# Patient Record
Sex: FEMALE | Race: BLACK OR AFRICAN AMERICAN | Employment: UNEMPLOYED | URBAN - METROPOLITAN AREA
[De-identification: names, ages, dates, MRNs, and addresses within clinical notes are randomized per-mention and may not be internally consistent; named-entity substitution may affect disease eponyms.]

---

## 2017-01-03 RX ORDER — PREDNISONE 10 MG/1
10 TABLET ORAL
Qty: 30 TAB | Refills: 0 | Status: SHIPPED | OUTPATIENT
Start: 2017-01-03 | End: 2017-01-18 | Stop reason: SDUPTHER

## 2017-01-03 NOTE — TELEPHONE ENCOUNTER
Pt would like to get an refill on her prednisone to be sent to her pharmacy [facility]

## 2017-01-19 ENCOUNTER — TELEPHONE (OUTPATIENT)
Dept: PULMONOLOGY | Age: 62
End: 2017-01-19

## 2017-01-19 RX ORDER — PREDNISONE 10 MG/1
TABLET ORAL
Qty: 30 TAB | Refills: 3 | Status: SHIPPED | OUTPATIENT
Start: 2017-01-19 | End: 2017-02-07 | Stop reason: DRUGHIGH

## 2017-01-19 NOTE — TELEPHONE ENCOUNTER
The pt. Is contacted to make an appt. .    She c/o a lot of chest congestion with production of thick white sputum x 2 weeks. Congestion is audible over the phone. She also, has sweating episodes. Increased SOB and Wheezing scale 4/10. She is usually on Prednisone 10 mg daily. She denies elevated temp. Allergies per list  [facility]

## 2017-01-19 NOTE — TELEPHONE ENCOUNTER
MD Radha Ward, OLIVIER       Caller: Unspecified (Today, 12:17 PM)                     Sounds like a cold which is usually self limited. Please advise pt to continue current meds. Prednisone renewed. Consult ED or urgent care if symptoms worsen       The pt is informed of same.

## 2017-01-28 ENCOUNTER — HOSPITAL ENCOUNTER (OUTPATIENT)
Dept: LAB | Age: 62
Discharge: HOME | End: 2017-01-28

## 2017-01-28 LAB — SENTARA SPECIMEN COL,SENBCF: NORMAL

## 2017-01-28 PROCEDURE — 99001 SPECIMEN HANDLING PT-LAB: CPT | Performed by: INTERNAL MEDICINE

## 2017-02-01 RX ORDER — PREDNISONE 10 MG/1
TABLET ORAL
Qty: 30 TAB | Refills: 0 | Status: SHIPPED | OUTPATIENT
Start: 2017-02-01 | End: 2017-02-07 | Stop reason: DRUGHIGH

## 2017-02-07 ENCOUNTER — OFFICE VISIT (OUTPATIENT)
Dept: PULMONOLOGY | Age: 62
End: 2017-02-07

## 2017-02-07 VITALS
TEMPERATURE: 98.4 F | HEIGHT: 65 IN | RESPIRATION RATE: 18 BRPM | BODY MASS INDEX: 36.99 KG/M2 | HEART RATE: 103 BPM | DIASTOLIC BLOOD PRESSURE: 60 MMHG | OXYGEN SATURATION: 99 % | WEIGHT: 222 LBS | SYSTOLIC BLOOD PRESSURE: 102 MMHG

## 2017-02-07 DIAGNOSIS — E66.9 OBESITY (BMI 30-39.9): ICD-10-CM

## 2017-02-07 DIAGNOSIS — N18.9 CHRONIC KIDNEY DISEASE, UNSPECIFIED STAGE: ICD-10-CM

## 2017-02-07 DIAGNOSIS — F17.200 TOBACCO DEPENDENCE: ICD-10-CM

## 2017-02-07 DIAGNOSIS — J44.9 CHRONIC AIRWAY OBSTRUCTION, NOT ELSEWHERE CLASSIFIED: Primary | ICD-10-CM

## 2017-02-07 DIAGNOSIS — D86.9 SARCOIDOSIS: ICD-10-CM

## 2017-02-07 DIAGNOSIS — R09.02 HYPOXEMIA REQUIRING SUPPLEMENTAL OXYGEN: ICD-10-CM

## 2017-02-07 DIAGNOSIS — Z99.81 HYPOXEMIA REQUIRING SUPPLEMENTAL OXYGEN: ICD-10-CM

## 2017-02-07 RX ORDER — PREDNISONE 5 MG/1
5 TABLET ORAL DAILY
Qty: 30 TAB | Refills: 1 | Status: ON HOLD | OUTPATIENT
Start: 2017-02-07 | End: 2017-04-12 | Stop reason: SDUPTHER

## 2017-02-07 NOTE — PROGRESS NOTES
Ms. Drew Mendoza has a reminder for a \"due or due soon\" health maintenance. I have asked that she contact her primary care provider for follow-up on this health maintenance.

## 2017-02-07 NOTE — MR AVS SNAPSHOT
Visit Information Date & Time Provider Department Dept. Phone Encounter #  
 2/7/2017  2:15 PM Nickie Choi MD [facility] [place] [id number] Follow-up Instructions Return in about 4 weeks (around 3/7/2017). Follow-up and Disposition History Your Appointments 2/9/2017 11:15 AM  
ESTABLISHED PATIENT with Lux Willoughby MD  
[facility]) Appt Note: 3 months [address] [facility], [address] [facility] [id number], [address] [place] [address] Upcoming Health Maintenance Date Due  
 FOOT EXAM Q1 9/12/1966 MICROALBUMIN Q1 9/12/1966 EYE EXAM RETINAL OR DILATED Q1 9/12/1966 DTaP/Tdap/Td series (1 - Tdap) 9/12/1977 FOBT Q 1 YEAR AGE 50-75 9/12/2006 BREAST CANCER SCRN MAMMOGRAM 3/20/2015 PAP AKA CERVICAL CYTOLOGY 8/27/2015 Pneumococcal 19-64 Highest Risk (2 of 3 - PCV13) 7/10/2016 INFLUENZA AGE 9 TO ADULT 8/1/2016 ZOSTER VACCINE AGE 60> 9/12/2016 HEMOGLOBIN A1C Q6M 4/30/2017 LIPID PANEL Q1 11/6/2017 Allergies as of 2/7/2017  Review Complete On: 2/7/2017 By: Nickie Choi MD  
  
 Severity Noted Reaction Type Reactions Moxifloxacin  09/24/2015    Rash Pcn [Penicillins]    Swelling Sulfa (Sulfonamide Antibiotics)    Swelling Current Immunizations  Reviewed on 11/29/2012 Name Date Influenza Vaccine 10/10/2015, 12/29/2014 Influenza Vaccine Split 10/30/2012 Pneumococcal Polysaccharide (PPSV-23) 7/10/2015 Not reviewed this visit You Were Diagnosed With   
  
 Codes Comments Chronic airway obstruction, not elsewhere classified ([facility] [address]    -  Primary ICD-10-CM: J44.9 ICD-9-CM: 242 Tobacco dependence     ICD-10-CM: V00.908 ICD-9-CM: 305.1 Obesity (BMI 30-39. 9)     ICD-10-CM: E66.9 ICD-9-CM: 278.00  Hypoxemia requiring supplemental oxygen     ICD-10-CM: R09.02, Z99.81 
 ICD-9-CM: 799.02 Chronic kidney disease, unspecified stage     ICD-10-CM: N18.9 ICD-9-CM: 583. 9 Sarcoidosis ([facility] [address]     ICD-10-CM: D86.9 ICD-9-CM: 135 Vitals BP Pulse Temp Resp Height(growth percentile) Weight(growth percentile) 102/60 (BP 1 Location: Left arm, BP Patient Position: Sitting) (!) 103 98.4 °F (36.9 °C) (Oral) 18 5' 5\" (1.651 m) 222 lb (100.7 kg) LMP SpO2 BMI OB Status Smoking Status 11/25/2012 99% 36.94 kg/m2 Postmenopausal Former Smoker BMI and BSA Data Body Mass Index Body Surface Area  
 36.94 kg/m 2 2.15 m 2 Preferred Pharmacy Pharmacy Name Phone [facility], [facility] [address] [phone] Your Updated Medication List  
  
   
This list is accurate as of: 2/7/17  2:59 PM.  Always use your most recent med list.  
  
  
  
  
 ABILIFY 5 mg tablet Generic drug:  ARIPiprazole Take 10 mg by mouth daily. * albuterol 2.5 mg /3 mL (0.083 %) nebulizer solution Commonly known as:  PROVENTIL VENTOLIN  
USE 1 NEB EVERY 4 HOURS FOR WHEEZING AND SHORTNESS OF BREATH  Indications: CHRONIC OBSTRUCTIVE PULMONARY DISEASE * VENTOLIN HFA 90 mcg/actuation inhaler Generic drug:  albuterol INHALE 2 PUFFS EVERY 4 HOURS AS NEEDED  
  
 aspirin 81 mg tablet Take 81 mg by mouth daily. clopidogrel 75 mg Tab Commonly known as:  PLAVIX Take 1 Tab by mouth daily. DEPAKOTE 250 mg tablet Generic drug:  divalproex DR Take 250 mg by mouth nightly. doxycycline 100 mg capsule Commonly known as:  [place] Take 1 Cap by mouth two (2) times a day. eplerenone 25 mg tablet Commonly known as:  Major Caroli Take  by mouth daily. furosemide 20 mg tablet Commonly known as:  LASIX Take 1 Tab by mouth daily. HYDROcodone-acetaminophen 5-325 mg per tablet Commonly known as:  Susan Jose Take 1 Tab by mouth every six (6) hours as needed for Pain. Max Daily Amount: 4 Tabs. insulin CONCENTRATED regular 500 unit/mL Soln Commonly known as:  U-500 CONCENTRATED  
20 Units by SubCUTAneous route. With breakfast, lunch, and dinner  
  
 insulin glargine 100 unit/mL injection Commonly known as:  LANTUS  
60 units subcutaneously every night  
  
 insulin syringe,safetyneedle 1 mL 30 gauge x 5/16\" Syrg As directed METANX 2.8-2-25 mg Tab Generic drug:  l-methylfolate-vit b12-vit b6 Take  by mouth.  
  
 metOLazone 5 mg tablet Commonly known as:  [place] Ou Take 0.5 Tabs by mouth Every Mon, Wed & Sun.  
  
 [employer] Kit Use with nebulizer machine NEURONTIN 300 mg capsule Generic drug:  gabapentin Take 300 mg by mouth three (3) times daily. Oxygen-Air Delivery Systems  
by Nasal route continuous. 2 LNC potassium chloride 20 mEq packet Commonly known as:  KLOR-CON Take 1 Packet by mouth daily. predniSONE 5 mg tablet Commonly known as:  Samanta Hawks Take 1 Tab by mouth daily. rosuvastatin 5 mg tablet Commonly known as:  CRESTOR Take 1 Tab by mouth nightly. SYNTHROID 100 mcg tablet Generic drug:  levothyroxine Take  by mouth Daily (before breakfast). traZODone 100 mg tablet Commonly known as:  Orma Paddy Take 100 mg by mouth nightly. TYLENOL 325 mg tablet Generic drug:  acetaminophen Take 325 mg by mouth every six (6) hours as needed for Pain. * umeclidinium-vilanterol 62.5-25 mcg/actuation inhaler Commonly known as:  Paulo Gusman Take 1 Puff by inhalation daily. * umeclidinium-vilanterol 62.5-25 mcg/actuation inhaler Commonly known as:  Paulo Gumsan Take 1 Puff by inhalation daily. VITAMIN D2 50,000 unit capsule Generic drug:  ergocalciferol Take 50,000 Units by mouth daily. ZOLOFT 100 mg tablet Generic drug:  sertraline Take 50 mg by mouth daily. * Notice:   This list has 4 medication(s) that are the same as other medications prescribed for you. Read the directions carefully, and ask your doctor or other care provider to review them with you. Prescriptions Sent to Pharmacy Refills  
 umeclidinium-vilanterol (ANORO ELLIPTA) 62.5-25 mcg/actuation inhaler 5 Sig: Take 1 Puff by inhalation daily. Class: Normal  
 Pharmacy: [facility] [place], [address] Ph #: [phone] Route: Inhalation  
 predniSONE (DELTASONE) 5 mg tablet 1 Sig: Take 1 Tab by mouth daily. Class: Normal  
 Pharmacy: [facility] [place], [address]  Ph #: [phone] Route: Oral  
  
Follow-up Instructions Return in about 4 weeks (around 3/7/2017). Please provide this summary of care documentation to your next provider. Your primary care clinician is listed as [address] [place] [address] Ave. If you have any questions after today's visit, please call [phone].

## 2017-02-07 NOTE — PROGRESS NOTES
HISTORY OF PRESENT ILLNESS  Lina Sanchez is a 62 y.o. female. HPI Comments: History of Sarcoidosis and COPD. Admitted  recently for COPD exacerbation. Pt still complains of SOB and audible wheezing moderately improved by Prednisone taper and BD, pt now on Prednisone 10 mg. Pt discharged home on Spiriva, pt notes some increase in baseline dyspnea and SNYDER. Pt admits to going back to smoking    Pt with history of CKD but has not seen a new nephrologist since Dr. Devorah Forman retired. Review of Systems   Constitutional: Positive for malaise/fatigue. Negative for chills, diaphoresis, fever and weight loss. HENT: Negative for congestion, ear discharge, ear pain, hearing loss, nosebleeds, sore throat and tinnitus. Eyes: Negative for blurred vision, double vision, photophobia, pain, discharge and redness. Respiratory: Positive for cough, shortness of breath and wheezing. Negative for hemoptysis, sputum production and stridor. Cardiovascular: Negative for palpitations, claudication and PND. Orthopnea: mild. Leg swelling: chronic. Gastrointestinal: Negative for constipation, diarrhea, heartburn, melena, nausea and vomiting. Genitourinary: Negative for dysuria, flank pain, frequency, hematuria and urgency. Musculoskeletal: Positive for back pain and joint pain. Negative for falls and myalgias. Skin: Negative for itching and rash. Neurological: Negative for dizziness, tingling, tremors, sensory change, speech change, focal weakness, seizures, loss of consciousness and weakness. Endo/Heme/Allergies: Negative for environmental allergies. Does not bruise/bleed easily. Psychiatric/Behavioral: Positive for depression. Negative for hallucinations, memory loss, substance abuse and suicidal ideas. The patient is nervous/anxious and has insomnia.       Past Medical History   Diagnosis Date    Anxiety     Asthma     Back injury 1986     jumped out of second story window    Behavioral change     Bilateral knee pain     Bipolar disorder (HCC)     CHF (congestive heart failure) (HCC)     Chronic airway obstruction, not elsewhere classified ([facility] [address]      SOB, on paula O2 Recent admission with psychosis    Chronic back pain     Chronic diastolic heart failure (HCC)      Stable on diuretics    Chronic kidney disease      stage III    Congestive heart failure (HCC)     COPD     COPD (chronic obstructive pulmonary disease) ([address] 10/31/2012    Cramps, muscle, general     Depression     Diabetes mellitus     Diabetes mellitus ([address]     Difficulty speaking     Difficulty walking     Difficulty writing     DJD (degenerative joint disease) of knee      bilateral, mild    Edema      The edema involves both lower extremities. The episodes last for 1 week. The patient describes this as worsening. Symptoms are exacerbated by prolonged sitting. Symptoms are relieved by leg elevation and diuretics. NO CHANGE    Emphysema     Essential hypertension, benign      Better controlled    Falls frequently     Fatigue     Generalized stiffness     Headache(784.0)     Heartburn     Hiatal hernia     History of hepatitis C     Hoarseness of voice     Hypertension     Hypothyroidism     Lung disease     Memory difficulty     Morbid obesity (HCC)     Multiple joint pain     Nausea     Numbness and tingling      arms and legs, related to diabetic neuropathy    Osteoarthritis of left knee     Other and unspecified hyperlipidemia      LDL reportedly very high/started on crestor recently by diabetic doctor    Oxygen dependent     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     Sarcoidosis ([facility] [address]     Sickle cell trait ([facility] [address]     Sinusitis     SOB (shortness of breath)     STD (female)      herpes    Swelling of body region      legs and feet    Tinnitus     Type II or unspecified type diabetes mellitus without mention of complication, not stated as uncontrolled     Venous insufficiency     Vertigo     Weakness generalized     Wears glasses     Weight gain      Current Outpatient Prescriptions on File Prior to Visit   Medication Sig Dispense Refill    VENTOLIN HFA 90 mcg/actuation inhaler INHALE 2 PUFFS EVERY 4 HOURS AS NEEDED 1 Inhaler 4    divalproex DR (DEPAKOTE) 250 mg tablet Take 250 mg by mouth nightly.  furosemide (LASIX) 20 mg tablet Take 1 Tab by mouth daily. 30 Tab 0    metOLazone (ZAROXOLYN) 5 mg tablet Take 0.5 Tabs by mouth Every Mon, Wed & Sun. 30 Tab 6    potassium chloride (KLOR-CON) 20 mEq packet Take 1 Packet by mouth daily. 30 Each 0    clopidogrel (PLAVIX) 75 mg tablet Take 1 Tab by mouth daily. 30 Tab 0    ARIPiprazole (ABILIFY) 5 mg tablet Take 10 mg by mouth daily.  albuterol (PROVENTIL VENTOLIN) 2.5 mg /3 mL (0.083 %) nebulizer solution USE 1 NEB EVERY 4 HOURS FOR WHEEZING AND SHORTNESS OF BREATH  Indications: CHRONIC OBSTRUCTIVE PULMONARY DISEASE 2 Package 3    eplerenone (INSPRA) 25 mg tablet Take  by mouth daily.  gabapentin (NEURONTIN) 300 mg capsule Take 300 mg by mouth three (3) times daily.  acetaminophen (TYLENOL) 325 mg tablet Take 325 mg by mouth every six (6) hours as needed for Pain.  traZODone (DESYREL) 100 mg tablet Take 100 mg by mouth nightly.  rosuvastatin (CRESTOR) 5 mg tablet Take 1 Tab by mouth nightly. 30 Tab 6    aspirin 81 mg tablet Take 81 mg by mouth daily.  Neb[employer] Kit Use with nebulizer machine 1 Kit prn    insulin CONCENTRATED regular (U-500 CONCENTRATED) 500 unit/mL Soln 20 Units by SubCUTAneous route. With breakfast, lunch, and dinner      levothyroxine (SYNTHROID) 100 mcg tablet Take  by mouth Daily (before breakfast).  sertraline (ZOLOFT) 100 mg tablet Take 50 mg by mouth daily.  Oxygen-Air Delivery Systems by Nasal route continuous. 2 LNC      ergocalciferol (VITAMIN D) 50,000 unit capsule Take 50,000 Units by mouth daily.       doxycycline (MONODOX) 100 mg capsule Take 1 Cap by mouth two (2) times a day. 20 Cap 0    insulin glargine (LANTUS) 100 unit/mL injection 60 units subcutaneously every night 1 Vial 0    HYDROcodone-acetaminophen (NORCO) 5-325 mg per tablet Take 1 Tab by mouth every six (6) hours as needed for Pain. Max Daily Amount: 4 Tabs. 20 Tab 0    insulin syringe,safetyneedle 1 mL 30 gauge x 5/16\" syrg As directed 50 Each 0    l-methylfolate-vit b12-vit b6 (METANX) 2.8-2-25 mg Tab Take  by mouth. No current facility-administered medications on file prior to visit. Allergies   Allergen Reactions    Moxifloxacin Rash    Pcn [Penicillins] Swelling    Sulfa (Sulfonamide Antibiotics) Swelling     Social History     Social History    Marital status: SINGLE     Spouse name: N/A    Number of children: N/A    Years of education: N/A     Occupational History    Not employed Not Employed     Social History Main Topics    Smoking status: Former Smoker     Packs/day: 0.50     Years: 30.00     Types: Cigarettes     Start date: 12/2/1969     Quit date: 10/31/2016    Smokeless tobacco: Never Used    Alcohol use No    Drug use: No      Comment: 2005    Sexual activity: Not on file      Comment: 2014     Other Topics Concern    Not on file     Social History Narrative    Lives with 15year old son. Blood pressure 102/60, pulse (!) 103, temperature 98.4 °F (36.9 °C), temperature source Oral, resp. rate 18, height 5' 5\" (1.651 m), weight 100.7 kg (222 lb), last menstrual period 11/25/2012, SpO2 99 %. Physical Exam   Constitutional: She is oriented to person, place, and time. No distress. obese   HENT:   Head: Normocephalic and atraumatic. Nose: Nose normal.   Eyes: Conjunctivae are normal. Pupils are equal, round, and reactive to light. Right eye exhibits no discharge. Left eye exhibits no discharge. No scleral icterus. Neck: No JVD present. No tracheal deviation present. No thyromegaly present. Cardiovascular: Normal rate, regular rhythm and intact distal pulses. Exam reveals no gallop. No murmur heard. Pulmonary/Chest: Effort normal and breath sounds normal. No stridor. No respiratory distress. Wheezes: bilateral inspiratory and expiratory. She has no rales. She exhibits no tenderness. Abdominal: Soft. She exhibits no mass. There is no tenderness. Musculoskeletal: She exhibits no tenderness. Edema: trace. Lymphadenopathy:     She has no cervical adenopathy. Neurological: She is alert and oriented to person, place, and time. Skin: Skin is warm and dry. No rash noted. She is not diaphoretic. No erythema. Large ecchymosis medial left forearm   Psychiatric: She has a normal mood and affect. Her behavior is normal. Judgment and thought content normal.       Rib Films 7/3/16: no fracture noted. CXR: chronic interstitial lung disease    ASSESSMENT and PLAN  Encounter Diagnoses   Name Primary?  Chronic airway obstruction, not elsewhere classified ([address] Yes    Tobacco dependence     Obesity (BMI 30-39. 9)     Hypoxemia requiring supplemental oxygen     Chronic kidney disease, unspecified stage     Sarcoidosis ([address]        Pt on chronic Prednisone and already suffering side effects of this. Will start slow taper, to start at 5 mg daily and follow up with me in 4 weeks. Resume Anoro. Rx for Albuterol refills sent. RTC 4 weeks. Counseled on smoking cessation. Guy Esparza

## 2017-02-09 ENCOUNTER — OFFICE VISIT (OUTPATIENT)
Dept: CARDIOLOGY CLINIC | Age: 62
End: 2017-02-09

## 2017-02-09 VITALS
SYSTOLIC BLOOD PRESSURE: 105 MMHG | BODY MASS INDEX: 36.49 KG/M2 | WEIGHT: 219 LBS | HEART RATE: 90 BPM | DIASTOLIC BLOOD PRESSURE: 75 MMHG | HEIGHT: 65 IN

## 2017-02-09 DIAGNOSIS — R94.39 ABNORMAL NUCLEAR STRESS TEST: ICD-10-CM

## 2017-02-09 DIAGNOSIS — I10 ESSENTIAL HYPERTENSION, BENIGN: ICD-10-CM

## 2017-02-09 DIAGNOSIS — Z01.810 PRE-OPERATIVE CARDIOVASCULAR EXAMINATION: ICD-10-CM

## 2017-02-09 DIAGNOSIS — I50.32 CHRONIC DIASTOLIC HEART FAILURE (HCC): Primary | ICD-10-CM

## 2017-02-09 NOTE — PROGRESS NOTES
HISTORY OF PRESENT ILLNESS  Sandi Amos is a 62 y.o. female. CHF   The history is provided by the patient. This is a chronic problem. The problem occurs constantly. The problem has not changed since onset. Associated symptoms include shortness of breath. Pertinent negatives include no chest pain. The symptoms are aggravated by exertion. The symptoms are relieved by rest.   Hypertension   The history is provided by the patient. This is a chronic problem. The problem occurs constantly. The problem has not changed since onset. Associated symptoms include shortness of breath. Pertinent negatives include no chest pain. Nothing aggravates the symptoms. Cholesterol Problem   The history is provided by the patient. This is a chronic problem. The problem occurs constantly. The problem has not changed since onset. Associated symptoms include shortness of breath. Pertinent negatives include no chest pain. Shortness of Breath   The history is provided by the patient. This is a recurrent problem. The problem occurs frequently. The problem has been gradually improving. Associated symptoms include leg swelling. Pertinent negatives include no fever, no cough, no sputum production, no hemoptysis, no wheezing, no PND, no orthopnea, no chest pain, no vomiting, no rash and no claudication. Associated medical issues include COPD and heart failure. Leg Swelling   The history is provided by the patient. This is a recurrent problem. The problem occurs every several days. Associated symptoms include shortness of breath. Pertinent negatives include no chest pain.      Family History   Problem Relation Age of Onset    Seizures Other     Diabetes Mother     Hypertension Mother     Heart Disease Mother     Cancer Mother     Diabetes Father     Stroke Father     Heart Disease Father     Headache Sister     Depression Neg Hx     Suicide Neg Hx     Psychotic Disorder Neg Hx     Substance Abuse Neg Hx     Dementia Neg Hx Past Medical History   Diagnosis Date    Anxiety     Asthma     Back injury 1986     jumped out of second story window    Behavioral change     Bilateral knee pain     Bipolar disorder ([facility]     CHF (congestive heart failure) (HCC)     Chronic airway obstruction, not elsewhere classified ([address]      SOB, on paula O2 Recent admission with psychosis    Chronic back pain     Chronic diastolic heart failure (HCC)      Stable on diuretics    Chronic kidney disease      stage III    Congestive heart failure (HCC)     COPD     COPD (chronic obstructive pulmonary disease) ([address] 10/31/2012    Cramps, muscle, general     Depression     Diabetes mellitus     Diabetes mellitus ([address]     Difficulty speaking     Difficulty walking     Difficulty writing     DJD (degenerative joint disease) of knee      bilateral, mild    Edema      The edema involves both lower extremities. The episodes last for 1 week. The patient describes this as worsening. Symptoms are exacerbated by prolonged sitting. Symptoms are relieved by leg elevation and diuretics. NO CHANGE    Emphysema     Essential hypertension, benign      Better controlled    Falls frequently     Fatigue     Generalized stiffness     Headache(784.0)     Heartburn     Hiatal hernia     History of hepatitis C     Hoarseness of voice     Hypertension     Hypothyroidism     Lung disease     Memory difficulty     Morbid obesity (HCC)     Multiple joint pain     Nausea     Numbness and tingling      arms and legs, related to diabetic neuropathy    Osteoarthritis of left knee     Other and unspecified hyperlipidemia      LDL reportedly very high/started on crestor recently by diabetic doctor    Oxygen dependent     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     Sarcoidosis ([address]     Sickle cell trait (HCC)     Sinusitis     SOB (shortness of breath)     STD (female)      herpes    Swelling of body region      legs and feet    Tinnitus     Type II or unspecified type diabetes mellitus without mention of complication, not stated as uncontrolled     Venous insufficiency     Vertigo     Weakness generalized     Wears glasses     Weight gain        Past Surgical History   Procedure Laterality Date    Hx back surgery      Hx  section      Hx tubal ligation         Allergies   Allergen Reactions    Moxifloxacin Rash    Pcn [Penicillins] Swelling    Sulfa (Sulfonamide Antibiotics) Swelling       Current Outpatient Prescriptions   Medication Sig    umeclidinium-vilanterol (ANORO ELLIPTA) 62.5-25 mcg/actuation inhaler Take 1 Puff by inhalation daily.  umeclidinium-vilanterol (ANORO ELLIPTA) 62.5-25 mcg/actuation inhaler Take 1 Puff by inhalation daily.  predniSONE (DELTASONE) 5 mg tablet Take 1 Tab by mouth daily.  VENTOLIN HFA 90 mcg/actuation inhaler INHALE 2 PUFFS EVERY 4 HOURS AS NEEDED    divalproex DR (DEPAKOTE) 250 mg tablet Take 250 mg by mouth nightly.  furosemide (LASIX) 20 mg tablet Take 1 Tab by mouth daily.  metOLazone (ZAROXOLYN) 5 mg tablet Take 0.5 Tabs by mouth Every Mon, Wed & Sun.    potassium chloride (KLOR-CON) 20 mEq packet Take 1 Packet by mouth daily.  clopidogrel (PLAVIX) 75 mg tablet Take 1 Tab by mouth daily.  insulin glargine (LANTUS) 100 unit/mL injection 60 units subcutaneously every night    insulin syringe,safetyneedle 1 mL 30 gauge x 16\" syrg As directed    ARIPiprazole (ABILIFY) 5 mg tablet Take 10 mg by mouth daily.  albuterol (PROVENTIL VENTOLIN) 2.5 mg /3 mL (0.083 %) nebulizer solution USE 1 NEB EVERY 4 HOURS FOR WHEEZING AND SHORTNESS OF BREATH  Indications: CHRONIC OBSTRUCTIVE PULMONARY DISEASE    eplerenone (INSPRA) 25 mg tablet Take  by mouth daily.  gabapentin (NEURONTIN) 300 mg capsule Take 300 mg by mouth three (3) times daily.  acetaminophen (TYLENOL) 325 mg tablet Take 325 mg by mouth every six (6) hours as needed for Pain.     traZODone (DESYREL) 100 mg tablet Take 100 mg by mouth nightly.  rosuvastatin (CRESTOR) 5 mg tablet Take 1 Tab by mouth nightly.  l-methylfolate-vit b12-vit b6 (METANX) 2.8-2-25 mg Tab Take  by mouth.  aspirin 81 mg tablet Take 81 mg by mouth daily.  Nebulizer Accessories Kit Use with nebulizer machine    insulin CONCENTRATED regular (U-500 CONCENTRATED) 500 unit/mL Soln 20 Units by SubCUTAneous route. With breakfast, lunch, and dinner    levothyroxine (SYNTHROID) 100 mcg tablet Take  by mouth Daily (before breakfast).  sertraline (ZOLOFT) 100 mg tablet Take 50 mg by mouth daily.  Oxygen-Air Delivery Systems by Nasal route continuous. 2 LNC    ergocalciferol (VITAMIN D) 50,000 unit capsule Take 50,000 Units by mouth daily. No current facility-administered medications for this visit. Visit Vitals    /75 (BP 1 Location: Left arm, BP Patient Position: At rest)    Pulse 90    Ht 5' 5\" (1.651 m)    Wt 99.3 kg (219 lb)    LMP 11/25/2012    BMI 36.44 kg/m2       Review of Systems   Constitutional: Negative for chills and fever. HENT: Negative for nosebleeds. Eyes: Negative for blurred vision and double vision. Respiratory: Positive for shortness of breath. Negative for cough, hemoptysis, sputum production and wheezing. Cardiovascular: Positive for leg swelling. Negative for chest pain, palpitations, orthopnea, claudication and PND. Gastrointestinal: Negative for heartburn, nausea and vomiting. Musculoskeletal: Negative for myalgias. Skin: Negative for rash. Neurological: Negative for dizziness and weakness. Endo/Heme/Allergies: Does not bruise/bleed easily.      Family History   Problem Relation Age of Onset    Seizures Other     Diabetes Mother     Hypertension Mother     Heart Disease Mother     Cancer Mother     Diabetes Father     Stroke Father     Heart Disease Father     Headache Sister     Depression Neg Hx     Suicide Neg Hx     Psychotic Disorder Neg Hx     Substance Abuse Neg Hx     Dementia Neg Hx        Past Medical History   Diagnosis Date    Anxiety     Asthma     Back injury 1986     jumped out of second story window    Behavioral change     Bilateral knee pain     B[facility] disorder [facility]     CHF (congestive heart failure) (HCC)     Chronic airway obstruction, not elsewhere classified ([facility] [facility]      SOB, on paula O2 Recent admission with psychosis    Chronic back pain     Chronic diastolic heart failure (HCC)      Stable on diuretics    Chronic kidney disease      stage III    Congestive heart failure (HCC)     COPD     COPD (chronic obstructive pulmonary disease) ([facility] [facility] 10/31/2012    Cramps, muscle, general     Depression     Diabetes mellitus     Diabetes mellitus ([address]     Difficulty speaking     Difficulty walking     Difficulty writing     DJD (degenerative joint disease) of knee      bilateral, mild    Edema      The edema involves both lower extremities. The episodes last for 1 week. The patient describes this as worsening. Symptoms are exacerbated by prolonged sitting. Symptoms are relieved by leg elevation and diuretics. NO CHANGE    Emphysema     Essential hypertension, benign      Better controlled    Falls frequently     Fatigue     Generalized stiffness     Headache(784.0)     Heartburn     Hiatal hernia     History of hepatitis C     Hoarseness of voice     Hypertension     Hypothyroidism     Lung disease     Memory difficulty     Morbid obesity (HCC)     Multiple joint pain     Nausea     Numbness and tingling      arms and legs, related to diabetic neuropathy    Osteoarthritis of left knee     Other and unspecified hyperlipidemia      LDL reportedly very high/started on crestor recently by diabetic doctor    Oxygen dependent     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     Sarcoidosis ([facility]     Sickle cell trait ([facility]     Sinusitis     SOB (shortness of breath)     STD (female)      herpes    Swelling of body region      legs and feet    Tinnitus     Type II or unspecified type diabetes mellitus without mention of complication, not stated as uncontrolled     Venous insufficiency     Vertigo     Weakness generalized     Wears glasses     Weight gain        Past Surgical History   Procedure Laterality Date    Hx back surgery      Hx  section      Hx tubal ligation         Allergies   Allergen Reactions    Moxifloxacin Rash    Pcn [Penicillins] Swelling    Sulfa (Sulfonamide Antibiotics) Swelling       Current Outpatient Prescriptions   Medication Sig    umeclidinium-vilanterol (ANORO ELLIPTA) 62.5-25 mcg/actuation inhaler Take 1 Puff by inhalation daily.  umeclidinium-vilanterol (ANORO ELLIPTA) 62.5-25 mcg/actuation inhaler Take 1 Puff by inhalation daily.  predniSONE (DELTASONE) 5 mg tablet Take 1 Tab by mouth daily.  VENTOLIN HFA 90 mcg/actuation inhaler INHALE 2 PUFFS EVERY 4 HOURS AS NEEDED    divalproex DR (DEPAKOTE) 250 mg tablet Take 250 mg by mouth nightly.  furosemide (LASIX) 20 mg tablet Take 1 Tab by mouth daily.  metOLazone (ZAROXOLYN) 5 mg tablet Take 0.5 Tabs by mouth Every Mon, Wed & Sun.    potassium chloride (KLOR-CON) 20 mEq packet Take 1 Packet by mouth daily.  clopidogrel (PLAVIX) 75 mg tablet Take 1 Tab by mouth daily.  insulin glargine (LANTUS) 100 unit/mL injection 60 units subcutaneously every night    insulin syringe,safetyneedle 1 mL 30 gauge x \" syrg As directed    ARIPiprazole (ABILIFY) 5 mg tablet Take 10 mg by mouth daily.  albuterol (PROVENTIL VENTOLIN) 2.5 mg /3 mL (0.083 %) nebulizer solution USE 1 NEB EVERY 4 HOURS FOR WHEEZING AND SHORTNESS OF BREATH  Indications: CHRONIC OBSTRUCTIVE PULMONARY DISEASE    eplerenone (INSPRA) 25 mg tablet Take  by mouth daily.  gabapentin (NEURONTIN) 300 mg capsule Take 300 mg by mouth three (3) times daily.     acetaminophen (TYLENOL) 325 mg tablet Take 325 mg by mouth every six (6) hours as needed for Pain.  traZODone (DESYREL) 100 mg tablet Take 100 mg by mouth nightly.  rosuvastatin (CRESTOR) 5 mg tablet Take 1 Tab by mouth nightly.  l-methylfolate-vit b12-vit b6 (METANX) 2.8-2-25 mg Tab Take  by mouth.  aspirin 81 mg tablet Take 81 mg by mouth daily.  Nebulizer Accessories Kit Use with nebulizer machine    insulin CONCENTRATED regular (U-500 CONCENTRATED) 500 unit/mL Soln 20 Units by SubCUTAneous route. With breakfast, lunch, and dinner    levothyroxine (SYNTHROID) 100 mcg tablet Take  by mouth Daily (before breakfast).  sertraline (ZOLOFT) 100 mg tablet Take 50 mg by mouth daily.  Oxygen-Air Delivery Systems by Nasal route continuous. 2 LNC    ergocalciferol (VITAMIN D) 50,000 unit capsule Take 50,000 Units by mouth daily. No current facility-administered medications for this visit. Visit Vitals    /75 (BP 1 Location: Left arm, BP Patient Position: At rest)    Pulse 90    Ht 5' 5\" (1.651 m)    Wt 99.3 kg (219 lb)    LMP 11/25/2012    BMI 36.44 kg/m2         Physical Exam   Constitutional: She is oriented to person, place, and time. She appears well-developed and well-nourished. HENT:   Head: Normocephalic and atraumatic. Eyes: Conjunctivae are normal.   Neck: Neck supple. No JVD present. No tracheal deviation present. No thyromegaly present. Cardiovascular: Normal rate and regular rhythm. PMI is not displaced. Exam reveals no gallop, no S3 and no decreased pulses. No murmur heard. Pulmonary/Chest: No respiratory distress. She has decreased breath sounds. She has no wheezes. She has no rales. She exhibits no tenderness. Abdominal: Soft. There is no tenderness. Musculoskeletal: She exhibits no edema (mild edema). Neurological: She is alert and oriented to person, place, and time. Skin: Skin is warm. Psychiatric: She has a normal mood and affect.        CARDIOLOGY STUDIES 10/1/2012 10/1/2012 12/1/2009 12/1/2009 11/1/2009 11/1/2009   EKG Result WNL WNL - - - -   Myocardial Perfusion Scan Result - - nl scan, EF 69% nl scan, EF 69% - -   Echocardiogram - Complete Result Normal EF, Enlarged rt side Normal EF, Enlarged RT side - - EF 60% EF 60%     Echo:4/2016   NORMAL GLOBAL LEFT VENTRICULAR SYSTOLIC FUNCTION. EJECTION FRACTION OF 65%. MILD DIASTOLIC DYSFUNCTION. DILATED LEFT ATRIUM. ESTIMATED PULMONARY ARTERY PRESSURE IS 39 MMHG. NO HEMODYNAMICALLY SIGNIFICANT VALVULAR PATHOLOGY. OTHER FINDINGS AS NOTED BELOW. I have personally reviewed patients ekg done at other facility. 7/2016-sr  I Have personally reviewed recent relevant labs available and discussed with patient  Cherelle,lui-[facility]-7/2016  I have personally reviewed patient's records available from hospital and other providers and incorporated findings in patient care. IMAGING AND PROCEDURES-10/2016  1. Chest x-ray was done and showed top normal size of heart, stable  hyperinflation, stable chronic increased interstitial marking consistent  with interstitial lung disease. 2. Dobutamine stress test was done, reported EF of 44% with a small  reversible inferior wall defect.-medically reated  3. Echocardiogram was done and showed 55% with grade 1 diastolic dysfunction. 4. Sputum culture was done and showed a few Demetra albicans.     Assessment       ICD-10-CM ICD-9-CM    1. Chronic diastolic heart failure (HCC) I50.32 428.32     stable  sob multifactrial   2. Abnormal nuclear stress test R94.39 794.39     stable on medical managment   3. Essential hypertension, benign I10 401.1     controlled   4.  Pre-operative cardiovascular examination Z01.810 V72.81     ok to stop plavix for colonosciopy  moderate risk   ok to stop plavix for procedures    Medications Discontinued During This Encounter   Medication Reason    HYDROcodone-acetaminophen (NORCO) 5-325 mg per tablet Not A Current Medication    doxycycline (MONODOX) 100 mg capsule Not A Current Medication       No orders of the defined types were placed in this encounter. Follow-up Disposition:  Return in about 6 months (around 8/9/2017) for Cleared for planned surgery-moderate risk.

## 2017-02-09 NOTE — PROGRESS NOTES
1. Have you been to the ER, urgent care clinic since your last visit? Hospitalized since your last visit? No    2. Have you seen or consulted any other health care providers outside of the [address] [place] [address] since your last visit? Include any pap smears or colon screening. No     3. Since your last visit, have you had any of the following symptoms? SOB      4. Have you had any blood work, X-rays or cardiac testing? Labs in [facility]    5. Where do you normally have your labs drawn? [address]     6. Do you need any refills today?   no    Medications confirmed by patient's medication list

## 2017-02-09 NOTE — LETTER
Stephen Sinha 1955 2/9/2017 Dear MD Olga Lidia Parmar MD 
 
I had the pleasure of evaluating  Ms. Sinha in office today. Below are the relevant portions of my assessment and plan of care. ICD-10-CM ICD-9-CM 1. Chronic diastolic heart failure (HCC) I50.32 428.32   
 stable 
sob multifactrial  
2. Abnormal nuclear stress test R94.39 794.39   
 stable on medical managment 3. Essential hypertension, benign I10 401.1   
 controlled 4. Pre-operative cardiovascular examination Z01.810 V72.81   
 ok to stop plavix for colonosciopy 
moderate risk Current Outpatient Prescriptions Medication Sig Dispense Refill  umeclidinium-vilanterol (ANORO ELLIPTA) 62.5-25 mcg/actuation inhaler Take 1 Puff by inhalation daily. 1 Inhaler 0  
 umeclidinium-vilanterol (ANORO ELLIPTA) 62.5-25 mcg/actuation inhaler Take 1 Puff by inhalation daily. 1 Inhaler 5  predniSONE (DELTASONE) 5 mg tablet Take 1 Tab by mouth daily. 30 Tab 1  VENTOLIN HFA 90 mcg/actuation inhaler INHALE 2 PUFFS EVERY 4 HOURS AS NEEDED 1 Inhaler 4  
 divalproex DR (DEPAKOTE) 250 mg tablet Take 250 mg by mouth nightly.  furosemide (LASIX) 20 mg tablet Take 1 Tab by mouth daily. 30 Tab 0  
 metOLazone (ZAROXOLYN) 5 mg tablet Take 0.5 Tabs by mouth Every Mon, Wed & Sun. 30 Tab 6  potassium chloride (KLOR-CON) 20 mEq packet Take 1 Packet by mouth daily. 30 Each 0  
 clopidogrel (PLAVIX) 75 mg tablet Take 1 Tab by mouth daily. 30 Tab 0  
 insulin glargine (LANTUS) 100 unit/mL injection 60 units subcutaneously every night 1 Vial 0  
 insulin syringe,safetyneedle 1 mL 30 gauge x 5/16\" syrg As directed 50 Each 0  
 ARIPiprazole (ABILIFY) 5 mg tablet Take 10 mg by mouth daily.     
 albuterol (PROVENTIL VENTOLIN) 2.5 mg /3 mL (0.083 %) nebulizer solution USE 1 NEB EVERY 4 HOURS FOR WHEEZING AND SHORTNESS OF BREATH  Indications: CHRONIC OBSTRUCTIVE PULMONARY DISEASE 2 Package 3  
  eplerenone (INSPRA) 25 mg tablet Take  by mouth daily.  gabapentin (NEURONTIN) 300 mg capsule Take 300 mg by mouth three (3) times daily.  acetaminophen (TYLENOL) 325 mg tablet Take 325 mg by mouth every six (6) hours as needed for Pain.  traZODone (DESYREL) 100 mg tablet Take 100 mg by mouth nightly.  rosuvastatin (CRESTOR) 5 mg tablet Take 1 Tab by mouth nightly. 30 Tab 6  l-methylfolate-vit b12-vit b6 (METANX) 2.8-2-25 mg Tab Take  by mouth.  aspirin 81 mg tablet Take 81 mg by mouth daily.  Nebulizer Accessories Kit Use with nebulizer machine 1 Kit prn  
 insulin CONCENTRATED regular (U-500 CONCENTRATED) 500 unit/mL Soln 20 Units by SubCUTAneous route. With breakfast, lunch, and dinner  levothyroxine (SYNTHROID) 100 mcg tablet Take  by mouth Daily (before breakfast).  sertraline (ZOLOFT) 100 mg tablet Take 50 mg by mouth daily.  Oxygen-Air Delivery Systems by Nasal route continuous. 2 LNC  ergocalciferol (VITAMIN D) 50,000 unit capsule Take 50,000 Units by mouth daily. No orders of the defined types were placed in this encounter. If you have questions, please do not hesitate to call me. I look forward to following Ms. Sinha along with you. Sincerely, Rebecca Cabrales MD

## 2017-02-09 NOTE — LETTER
2017 Dr. Severa Perch Dear Geovanna Mclain, Re: Neeljorge Sinha : 1955 Ms. Sinha is cleared with moderate risk for colonoscopy. She can hold plavix 7 days prior. If you have any questions or any further assistance is needed please contact our office. Sincerely, Merline Braver Bernerd Hammonds, MD 
  
cc:  Hany Horne MD

## 2017-02-11 ENCOUNTER — HOSPITAL ENCOUNTER (OUTPATIENT)
Dept: LAB | Age: 62
Discharge: HOME | End: 2017-02-11

## 2017-02-11 LAB — SENTARA SPECIMEN COL,SENBCF: NORMAL

## 2017-02-11 PROCEDURE — 99001 SPECIMEN HANDLING PT-LAB: CPT | Performed by: INTERNAL MEDICINE

## 2017-03-24 ENCOUNTER — TELEPHONE (OUTPATIENT)
Dept: PULMONOLOGY | Age: 62
End: 2017-03-24

## 2017-03-24 RX ORDER — PREDNISONE 10 MG/1
TABLET ORAL
Qty: 18 TAB | Refills: 0 | Status: SHIPPED | OUTPATIENT
Start: 2017-03-24 | End: 2017-04-04

## 2017-03-24 NOTE — TELEPHONE ENCOUNTER
Pt would like to speak with a nurse. She said that she thinks that she has bronchitis. She has been coughing up white mucus.  Please call [phone]

## 2017-03-24 NOTE — TELEPHONE ENCOUNTER
The pt. C/o chest congestion with prod. Cough of thick, white sputum x 1 week. She is wheezing and SOB scale 6/10. Wheezing worse HS. The pt. takes Pred. 5 mg daily. She also has upper thoracic pain. No elevated temp. .    [facility]ses [facility]       MD Oscar Robertson LPN        Caller: Unspecified (Today,  9:21 AM)                     Start Pred taper but if worse SOB needs to go to ED

## 2017-04-12 ENCOUNTER — APPOINTMENT (OUTPATIENT)
Dept: GENERAL RADIOLOGY | Age: 62
DRG: 364 | End: 2017-04-12
Attending: EMERGENCY MEDICINE
Payer: MEDICAID

## 2017-04-12 ENCOUNTER — HOSPITAL ENCOUNTER (INPATIENT)
Age: 62
LOS: 8 days | Discharge: HOME HEALTH | DRG: 364 | End: 2017-04-20
Attending: EMERGENCY MEDICINE | Admitting: HOSPITALIST
Payer: MEDICAID

## 2017-04-12 DIAGNOSIS — L03.116 CELLULITIS OF LEFT LOWER EXTREMITY: ICD-10-CM

## 2017-04-12 DIAGNOSIS — F14.10 COCAINE ABUSE (HCC): ICD-10-CM

## 2017-04-12 DIAGNOSIS — R73.9 HYPERGLYCEMIA: ICD-10-CM

## 2017-04-12 DIAGNOSIS — L03.114 CELLULITIS OF LEFT UPPER EXTREMITY: Primary | ICD-10-CM

## 2017-04-12 DIAGNOSIS — J44.9 CHRONIC OBSTRUCTIVE PULMONARY DISEASE, UNSPECIFIED COPD TYPE (HCC): ICD-10-CM

## 2017-04-12 PROBLEM — L03.90 CELLULITIS: Status: ACTIVE | Noted: 2017-04-12

## 2017-04-12 LAB
ALBUMIN SERPL BCP-MCNC: 3 G/DL (ref 3.4–5)
ALBUMIN/GLOB SERPL: 0.6 {RATIO} (ref 0.8–1.7)
ALP SERPL-CCNC: 47 U/L (ref 45–117)
ALT SERPL-CCNC: 21 U/L (ref 13–56)
AMPHET UR QL SCN: NEGATIVE
ANION GAP BLD CALC-SCNC: 7 MMOL/L (ref 3–18)
APPEARANCE UR: CLEAR
AST SERPL W P-5'-P-CCNC: 26 U/L (ref 15–37)
BARBITURATES UR QL SCN: NEGATIVE
BASOPHILS # BLD AUTO: 0 K/UL (ref 0–0.1)
BASOPHILS # BLD: 0 % (ref 0–2)
BENZODIAZ UR QL: NEGATIVE
BILIRUB SERPL-MCNC: 0.4 MG/DL (ref 0.2–1)
BILIRUB UR QL: NEGATIVE
BUN SERPL-MCNC: 21 MG/DL (ref 7–18)
BUN/CREAT SERPL: 15 (ref 12–20)
CALCIUM SERPL-MCNC: 9.2 MG/DL (ref 8.5–10.1)
CANNABINOIDS UR QL SCN: NEGATIVE
CHLORIDE SERPL-SCNC: 95 MMOL/L (ref 100–108)
CO2 SERPL-SCNC: 33 MMOL/L (ref 21–32)
COCAINE UR QL SCN: POSITIVE
COLOR UR: YELLOW
CREAT SERPL-MCNC: 1.42 MG/DL (ref 0.6–1.3)
DIFFERENTIAL METHOD BLD: ABNORMAL
EOSINOPHIL # BLD: 0.2 K/UL (ref 0–0.4)
EOSINOPHIL NFR BLD: 2 % (ref 0–5)
ERYTHROCYTE [DISTWIDTH] IN BLOOD BY AUTOMATED COUNT: 13 % (ref 11.6–14.5)
ETHANOL SERPL-MCNC: <3 MG/DL (ref 0–3)
GLOBULIN SER CALC-MCNC: 5.2 G/DL (ref 2–4)
GLUCOSE SERPL-MCNC: 400 MG/DL (ref 74–99)
GLUCOSE UR STRIP.AUTO-MCNC: >1000 MG/DL
HCT VFR BLD AUTO: 40.6 % (ref 35–45)
HDSCOM,HDSCOM: ABNORMAL
HGB BLD-MCNC: 13.7 G/DL (ref 12–16)
HGB UR QL STRIP: NEGATIVE
KETONES UR QL STRIP.AUTO: NEGATIVE MG/DL
LACTATE BLD-SCNC: 1.9 MMOL/L (ref 0.4–2)
LEUKOCYTE ESTERASE UR QL STRIP.AUTO: NEGATIVE
LYMPHOCYTES # BLD AUTO: 16 % (ref 21–52)
LYMPHOCYTES # BLD: 1.2 K/UL (ref 0.9–3.6)
MCH RBC QN AUTO: 28.2 PG (ref 24–34)
MCHC RBC AUTO-ENTMCNC: 33.7 G/DL (ref 31–37)
MCV RBC AUTO: 83.5 FL (ref 74–97)
METHADONE UR QL: NEGATIVE
MONOCYTES # BLD: 0.5 K/UL (ref 0.05–1.2)
MONOCYTES NFR BLD AUTO: 7 % (ref 3–10)
NEUTS SEG # BLD: 5.4 K/UL (ref 1.8–8)
NEUTS SEG NFR BLD AUTO: 75 % (ref 40–73)
NITRITE UR QL STRIP.AUTO: NEGATIVE
OPIATES UR QL: POSITIVE
PCP UR QL: NEGATIVE
PH UR STRIP: 5 [PH] (ref 5–8)
PLATELET # BLD AUTO: 159 K/UL (ref 135–420)
PMV BLD AUTO: 10.6 FL (ref 9.2–11.8)
POTASSIUM SERPL-SCNC: 3.7 MMOL/L (ref 3.5–5.5)
PROT SERPL-MCNC: 8.2 G/DL (ref 6.4–8.2)
PROT UR STRIP-MCNC: NEGATIVE MG/DL
RBC # BLD AUTO: 4.86 M/UL (ref 4.2–5.3)
SODIUM SERPL-SCNC: 135 MMOL/L (ref 136–145)
SP GR UR REFRACTOMETRY: 1.02 (ref 1–1.03)
UROBILINOGEN UR QL STRIP.AUTO: 1 EU/DL (ref 0.2–1)
VALPROATE SERPL-MCNC: 33 UG/ML (ref 50–100)
WBC # BLD AUTO: 7.2 K/UL (ref 4.6–13.2)

## 2017-04-12 PROCEDURE — 94761 N-INVAS EAR/PLS OXIMETRY MLT: CPT

## 2017-04-12 PROCEDURE — 80053 COMPREHEN METABOLIC PANEL: CPT | Performed by: EMERGENCY MEDICINE

## 2017-04-12 PROCEDURE — 73630 X-RAY EXAM OF FOOT: CPT

## 2017-04-12 PROCEDURE — 85025 COMPLETE CBC W/AUTO DIFF WBC: CPT | Performed by: EMERGENCY MEDICINE

## 2017-04-12 PROCEDURE — 83605 ASSAY OF LACTIC ACID: CPT

## 2017-04-12 PROCEDURE — 80307 DRUG TEST PRSMV CHEM ANLYZR: CPT | Performed by: EMERGENCY MEDICINE

## 2017-04-12 PROCEDURE — 83036 HEMOGLOBIN GLYCOSYLATED A1C: CPT | Performed by: HOSPITALIST

## 2017-04-12 PROCEDURE — 80164 ASSAY DIPROPYLACETIC ACD TOT: CPT | Performed by: EMERGENCY MEDICINE

## 2017-04-12 PROCEDURE — 71020 XR CHEST PA LAT: CPT

## 2017-04-12 PROCEDURE — 74011000258 HC RX REV CODE- 258: Performed by: EMERGENCY MEDICINE

## 2017-04-12 PROCEDURE — 65270000029 HC RM PRIVATE

## 2017-04-12 PROCEDURE — 99284 EMERGENCY DEPT VISIT MOD MDM: CPT

## 2017-04-12 PROCEDURE — 73130 X-RAY EXAM OF HAND: CPT

## 2017-04-12 PROCEDURE — 74011636637 HC RX REV CODE- 636/637: Performed by: EMERGENCY MEDICINE

## 2017-04-12 PROCEDURE — 81003 URINALYSIS AUTO W/O SCOPE: CPT | Performed by: EMERGENCY MEDICINE

## 2017-04-12 PROCEDURE — 96365 THER/PROPH/DIAG IV INF INIT: CPT

## 2017-04-12 PROCEDURE — 74011250636 HC RX REV CODE- 250/636: Performed by: EMERGENCY MEDICINE

## 2017-04-12 PROCEDURE — 87040 BLOOD CULTURE FOR BACTERIA: CPT | Performed by: EMERGENCY MEDICINE

## 2017-04-12 RX ORDER — DEXTROSE 50 % IN WATER (D50W) INTRAVENOUS SYRINGE
25-50 AS NEEDED
Status: DISCONTINUED | OUTPATIENT
Start: 2017-04-12 | End: 2017-04-13 | Stop reason: SDUPTHER

## 2017-04-12 RX ORDER — MAGNESIUM SULFATE 100 %
16 CRYSTALS MISCELLANEOUS AS NEEDED
Status: DISCONTINUED | OUTPATIENT
Start: 2017-04-12 | End: 2017-04-13 | Stop reason: SDUPTHER

## 2017-04-12 RX ORDER — INSULIN LISPRO 100 [IU]/ML
INJECTION, SOLUTION INTRAVENOUS; SUBCUTANEOUS
Status: DISCONTINUED | OUTPATIENT
Start: 2017-04-13 | End: 2017-04-13 | Stop reason: SDUPTHER

## 2017-04-12 RX ADMIN — SODIUM CHLORIDE 1000 ML: 900 INJECTION, SOLUTION INTRAVENOUS at 21:39

## 2017-04-12 RX ADMIN — MEROPENEM 1 G: 1 INJECTION, POWDER, FOR SOLUTION INTRAVENOUS at 21:36

## 2017-04-12 RX ADMIN — INSULIN HUMAN 10 UNITS: 100 INJECTION, SOLUTION PARENTERAL at 23:24

## 2017-04-12 NOTE — IP AVS SNAPSHOT
Current Discharge Medication List  
  
START taking these medications Dose & Instructions Dispensing Information Comments Morning Noon Evening Bedtime  
 levoFLOXacin 750 mg tablet Commonly known as:  Ranjit Oviedo Your last dose was: Your next dose is:    
   
   
 Dose:  750 mg Take 1 Tab by mouth daily for 7 days. Quantity:  7 Tab Refills:  0 Patient has taken Levaquin before so no serious side effects noted  
    
   
   
   
  
 oxyCODONE-acetaminophen 5-325 mg per tablet Commonly known as:  PERCOCET Your last dose was: Your next dose is:    
   
   
 Dose:  1 Tab Take 1 Tab by mouth every eight (8) hours as needed for Pain. Max Daily Amount: 3 Tabs. Quantity:  15 Tab Refills:  0 CONTINUE these medications which have NOT CHANGED Dose & Instructions Dispensing Information Comments Morning Noon Evening Bedtime ABILIFY 5 mg tablet Generic drug:  ARIPiprazole Your last dose was: Your next dose is:    
   
   
 Dose:  10 mg Take 10 mg by mouth daily. Refills:  0  
 take 2 tabs each morning * albuterol 2.5 mg /3 mL (0.083 %) nebulizer solution Commonly known as:  PROVENTIL VENTOLIN Your last dose was: Your next dose is:    
   
   
 USE 1 NEB EVERY 4 HOURS FOR WHEEZING AND SHORTNESS OF BREATH  Indications: CHRONIC OBSTRUCTIVE PULMONARY DISEASE Quantity:  2 Package Refills:  3  
     
   
   
   
  
 * VENTOLIN HFA 90 mcg/actuation inhaler Generic drug:  albuterol Your last dose was: Your next dose is:    
   
   
 INHALE 2 PUFFS EVERY 4 HOURS AS NEEDED Quantity:  1 Inhaler Refills:  4  
     
   
   
   
  
 aspirin 81 mg tablet Your last dose was: Your next dose is:    
   
   
 Dose:  81 mg Take 81 mg by mouth daily. Refills:  0  
     
   
   
   
  
 clopidogrel 75 mg Tab Commonly known as:  PLAVIX Your last dose was: Your next dose is:    
   
   
 Dose:  75 mg Take 1 Tab by mouth daily. Quantity:  30 Tab Refills:  0  
     
   
   
   
  
 DEPAKOTE 250 mg tablet Generic drug:  divalproex DR Your last dose was: Your next dose is:    
   
   
 Dose:  250 mg Take 250 mg by mouth nightly. Refills:  0  
     
   
   
   
  
 eplerenone 25 mg tablet Commonly known as:  [place] Your last dose was: Your next dose is: Take  by mouth daily. Refills:  0  
     
   
   
   
  
 furosemide 20 mg tablet Commonly known as:  LASIX Your last dose was: Your next dose is:    
   
   
 Dose:  20 mg Take 1 Tab by mouth daily. Quantity:  30 Tab Refills:  0  
     
   
   
   
  
 insulin CONCENTRATED regular 500 unit/mL Soln Commonly known as:  U-500 CONCENTRATED Your last dose was: Your next dose is:    
   
   
 Dose:  20 Units 20 Units by SubCUTAneous route. With breakfast, lunch, and dinner Refills:  0  
     
   
   
   
  
 insulin glargine 100 unit/mL injection Commonly known as:  LANTUS Your last dose was: Your next dose is:    
   
   
 60 units subcutaneously every night Quantity:  1 Vial  
Refills:  0  
     
   
   
   
  
 insulin syringe,safetyneedle 1 mL 30 gauge x 5/16\" Syrg Your last dose was: Your next dose is: As directed Quantity:  50 Each Refills:  0 METANX 2.8-2-25 mg Tab Generic drug:  l-methylfolate-vit b12-vit b6 Your last dose was: Your next dose is: Take  by mouth. Refills:  0  
     
   
   
   
  
 metOLazone 5 mg tablet Commonly known as:  Al Vázquez Your last dose was: Your next dose is:    
   
   
 Dose:  2.5 mg Take 0.5 Tabs by mouth Every Mon, Wed & Sun.  
 Quantity:  30 Tab Refills:  6 Nebulizer Accessories Kit Your last dose was: Your next dose is:    
   
   
 Use with nebulizer machine Quantity:  1 Kit Refills:  prn NEURONTIN 300 mg capsule Generic drug:  gabapentin Your last dose was: Your next dose is:    
   
   
 Dose:  300 mg Take 300 mg by mouth three (3) times daily. Refills:  0 Oxygen-Air Delivery Systems Your last dose was: Your next dose is:    
   
   
 by Nasal route continuous. 2 LNC Refills:  0  
     
   
   
   
  
 potassium chloride 20 mEq packet Commonly known as:  KLOR-CON Your last dose was: Your next dose is:    
   
   
 Dose:  20 mEq Take 1 Packet by mouth daily. Quantity:  30 Each Refills:  0  
     
   
   
   
  
 rosuvastatin 5 mg tablet Commonly known as:  CRESTOR Your last dose was: Your next dose is:    
   
   
 Dose:  5 mg Take 1 Tab by mouth nightly. Quantity:  30 Tab Refills:  6 SYNTHROID 100 mcg tablet Generic drug:  levothyroxine Your last dose was: Your next dose is: Take  by mouth Daily (before breakfast). Refills:  0  
     
   
   
   
  
 traZODone 100 mg tablet Commonly known as:  Seth Barron Your last dose was: Your next dose is:    
   
   
 Dose:  100 mg Take 100 mg by mouth nightly. Refills:  0  
     
   
   
   
  
 TYLENOL 325 mg tablet Generic drug:  acetaminophen Your last dose was: Your next dose is:    
   
   
 Dose:  325 mg Take 325 mg by mouth every six (6) hours as needed for Pain. Refills:  0  
     
   
   
   
  
 umeclidinium-vilanterol 62.5-25 mcg/actuation inhaler Commonly known as:  Bandar Daniel Your last dose was: Your next dose is:    
   
   
 Dose:  1 Puff Take 1 Puff by inhalation daily. Quantity:  1 Inhaler Refills:  5 VITAMIN D2 50,000 unit capsule Generic drug:  ergocalciferol Your last dose was: Your next dose is:    
   
   
 Dose:  74404 Units Take 50,000 Units by mouth daily. Refills:  0  
     
   
   
   
  
 ZOLOFT 100 mg tablet Generic drug:  sertraline Your last dose was: Your next dose is:    
   
   
 Dose:  50 mg Take 50 mg by mouth daily. Refills:  0  
     
   
   
   
  
 * Notice: This list has 2 medication(s) that are the same as other medications prescribed for you. Read the directions carefully, and ask your doctor or other care provider to review them with you. STOP taking these medications   
 predniSONE 5 mg tablet Commonly known as:  Cindy Da Silva Where to Get Your Medications Information on where to get these meds will be given to you by the nurse or doctor. ! Ask your nurse or doctor about these medications  
  levoFLOXacin 750 mg tablet  
 oxyCODONE-acetaminophen 5-325 mg per tablet

## 2017-04-12 NOTE — Clinical Note
Status[de-identified] Inpatient [101] Type of Bed: Medical [8] Inpatient Hospitalization Certified Necessary for the Following Reasons: 3. Patient receiving treatment that can only be provided in an inpatient setting (further clarification in H&P documentation) Admitting Diagnosis: Cellulitis [[id number]] Admitting Physician: Serina Tobias Attending Physician: Serina Tobisa Estimated Length of Stay: > or = to 2 Midnights Discharge Plan[de-identified] Home with Office Follow-up

## 2017-04-12 NOTE — IP AVS SNAPSHOT
[place] 
 
 
 [address] Patient: Renetta Sanchez MRN: [id number] You are allergic to the following Allergen Reactions Moxifloxacin Rash Pcn (Penicillins) Swelling Sulfa (Sulfonamide Antibiotics) Swelling Recent Documentation Height Weight BMI OB Status Smoking Status 1.651 m 105.3 kg 38.64 kg/m2 Postmenopausal Former Smoker Unresulted Labs Order Current Status CULTURE, ANAEROBIC Preliminary result CULTURE, ANAEROBIC Preliminary result CULTURE, FUNGUS Preliminary result CULTURE, FUNGUS Preliminary result CULTURE, SURGICAL WOUND Preliminary result Emergency Contacts Name Discharge Info Relation Home Work Mobile Rey Sinha DISCHARGE CAREGIVER [3] Brother [24] [phone] Love Sinha DISCHARGE CAREGIVER [3] Daughter [21] [phone] Love Sinha  Child [2] [phone] About your hospitalization You were admitted on:  April 12, 2017 You last received care in the:  [facility] [address] You were discharged on:  April 20, 2017 Unit phone number:  [phone] Why you were hospitalized Your primary diagnosis was:  Not on File Your diagnoses also included:  Cellulitis, Cellulitis And Abscess Of Hand, Cellulitis And Abscess Of Foot Providers Seen During Your Hospitalizations Provider Role Specialty Primary office phone Dalton Roberto MD Attending Provider Emergency Medicine [phone] Korin Mantilla MD Attending Provider Internal Medicine [phone] Your Primary Care Physician (PCP) Primary Care Physician Office Phone Office Fax Reji Quinn [phone] Follow-up Information Follow up With Details Comments Contact Info Valentín Sanchez MD On 4/21/2017 @10:30 w/NP [address] [place] [address] [phone] Your Appointments Thursday April 27, 2017  2:45 PM EDT Follow Up with Tray Cui MD  
[address] ([facility]) [address] [place] [address], [address] [address]  
[phone] Current Discharge Medication List  
  
START taking these medications Dose & Instructions Dispensing Information Comments Morning Noon Evening Bedtime  
 levoFLOXacin 750 mg tablet Commonly known as:  Luis Colander Your last dose was: Your next dose is:    
   
   
 Dose:  750 mg Take 1 Tab by mouth daily for 7 days. Quantity:  7 Tab Refills:  0 Patient has taken Levaquin before so no serious side effects noted  
    
   
   
   
  
 oxyCODONE-acetaminophen 5-325 mg per tablet Commonly known as:  PERCOCET Your last dose was: Your next dose is:    
   
   
 Dose:  1 Tab Take 1 Tab by mouth every eight (8) hours as needed for Pain. Max Daily Amount: 3 Tabs. Quantity:  15 Tab Refills:  0 CONTINUE these medications which have NOT CHANGED Dose & Instructions Dispensing Information Comments Morning Noon Evening Bedtime ABILIFY 5 mg tablet Generic drug:  ARIPiprazole Your last dose was: Your next dose is:    
   
   
 Dose:  10 mg Take 10 mg by mouth daily. Refills:  0  
 take 2 tabs each morning * albuterol 2.5 mg /3 mL (0.083 %) nebulizer solution Commonly known as:  PROVENTIL VENTOLIN Your last dose was: Your next dose is:    
   
   
 USE 1 NEB EVERY 4 HOURS FOR WHEEZING AND SHORTNESS OF BREATH  Indications: CHRONIC OBSTRUCTIVE PULMONARY DISEASE Quantity:  2 Package Refills:  3  
     
   
   
   
  
 * VENTOLIN HFA 90 mcg/actuation inhaler Generic drug:  albuterol Your last dose was: Your next dose is:    
   
   
 INHALE 2 PUFFS EVERY 4 HOURS AS NEEDED Quantity:  1 Inhaler Refills:  4  
     
   
   
   
  
 aspirin 81 mg tablet Your last dose was: Your next dose is:    
   
   
 Dose:  81 mg Take 81 mg by mouth daily. Refills:  0  
     
   
   
   
  
 clopidogrel 75 mg Tab Commonly known as:  PLAVIX Your last dose was: Your next dose is:    
   
   
 Dose:  75 mg Take 1 Tab by mouth daily. Quantity:  30 Tab Refills:  0  
     
   
   
   
  
 DEPAKOTE 250 mg tablet Generic drug:  divalproex DR Your last dose was: Your next dose is:    
   
   
 Dose:  250 mg Take 250 mg by mouth nightly. Refills:  0  
     
   
   
   
  
 eplerenone 25 mg tablet Commonly known as:  [place] Sy Your last dose was: Your next dose is: Take  by mouth daily. Refills:  0  
     
   
   
   
  
 furosemide 20 mg tablet Commonly known as:  LASIX Your last dose was: Your next dose is:    
   
   
 Dose:  20 mg Take 1 Tab by mouth daily. Quantity:  30 Tab Refills:  0  
     
   
   
   
  
 insulin CONCENTRATED regular 500 unit/mL Soln Commonly known as:  U-500 CONCENTRATED Your last dose was: Your next dose is:    
   
   
 Dose:  20 Units 20 Units by SubCUTAneous route. With breakfast, lunch, and dinner Refills:  0  
     
   
   
   
  
 insulin glargine 100 unit/mL injection Commonly known as:  LANTUS Your last dose was: Your next dose is:    
   
   
 60 units subcutaneously every night Quantity:  1 Vial  
Refills:  0  
     
   
   
   
  
 insulin syringe,safetyneedle 1 mL 30 gauge x 5/16\" Syrg Your last dose was: Your next dose is: As directed Quantity:  50 Each Refills:  0 METANX 2.8-2-25 mg Tab Generic drug:  l-methylfolate-vit b12-vit b6 Your last dose was: Your next dose is: Take  by mouth. Refills:  0  
     
   
   
   
  
 metOLazone 5 mg tablet Commonly known as:  Lindsay Baires Your last dose was: Your next dose is:    
   
   
 Dose:  2.5 mg Take 0.5 Tabs by mouth Every Mon, Wed & Sun.  
 Quantity:  30 Tab Refills:  6 Nebulizer Accessories Kit Your last dose was: Your next dose is:    
   
   
 Use with nebulizer machine Quantity:  1 Kit Refills:  prn NEURONTIN 300 mg capsule Generic drug:  gabapentin Your last dose was: Your next dose is:    
   
   
 Dose:  300 mg Take 300 mg by mouth three (3) times daily. Refills:  0 Oxygen-Air Delivery Systems Your last dose was: Your next dose is:    
   
   
 by Nasal route continuous. 2 LNC Refills:  0  
     
   
   
   
  
 potassium chloride 20 mEq packet Commonly known as:  KLOR-CON Your last dose was: Your next dose is:    
   
   
 Dose:  20 mEq Take 1 Packet by mouth daily. Quantity:  30 Each Refills:  0  
     
   
   
   
  
 rosuvastatin 5 mg tablet Commonly known as:  CRESTOR Your last dose was: Your next dose is:    
   
   
 Dose:  5 mg Take 1 Tab by mouth nightly. Quantity:  30 Tab Refills:  6 SYNTHROID 100 mcg tablet Generic drug:  levothyroxine Your last dose was: Your next dose is: Take  by mouth Daily (before breakfast). Refills:  0  
     
   
   
   
  
 traZODone 100 mg tablet Commonly known as:  Jocy Andujar Your last dose was: Your next dose is:    
   
   
 Dose:  100 mg Take 100 mg by mouth nightly. Refills:  0  
     
   
   
   
  
 TYLENOL 325 mg tablet Generic drug:  acetaminophen Your last dose was: Your next dose is:    
   
   
 Dose:  325 mg Take 325 mg by mouth every six (6) hours as needed for Pain. Refills:  0  
     
   
   
   
  
 umeclidinium-vilanterol 62.5-25 mcg/actuation inhaler Commonly known as:  Rosemary Alvarado Your last dose was: Your next dose is:    
   
   
 Dose:  1 Puff Take 1 Puff by inhalation daily. Quantity:  1 Inhaler Refills:  5 VITAMIN D2 50,000 unit capsule Generic drug:  ergocalciferol Your last dose was: Your next dose is:    
   
   
 Dose:  [address] Units Take 50,000 Units by mouth daily. Refills:  0  
     
   
   
   
  
 ZOLOFT 100 mg tablet Generic drug:  sertraline Your last dose was: Your next dose is:    
   
   
 Dose:  50 mg Take 50 mg by mouth daily. Refills:  0  
     
   
   
   
  
 * Notice: This list has 2 medication(s) that are the same as other medications prescribed for you. Read the directions carefully, and ask your doctor or other care provider to review them with you. STOP taking these medications   
 predniSONE 5 mg tablet Commonly known as:  Joe Vickers Where to Get Your Medications Information on where to get these meds will be given to you by the nurse or doctor. ! Ask your nurse or doctor about these medications  
  levoFLOXacin 750 mg tablet  
 oxyCODONE-acetaminophen 5-325 mg per tablet Discharge Instructions [facility] to Follow. DISCHARGE SUMMARY from Nurse The following personal items are in your possession at time of discharge: 
 
Dental Appliances: At home Visual Aid: At bedside, Glasses Other Valuables: Dennis Scruggs PATIENT INSTRUCTIONS: 
 
 
F-face looks uneven A-arms unable to move or move unevenly S-speech slurred or non-existent T-time-call 911 as soon as signs and symptoms begin-DO NOT go  
 Back to bed or wait to see if you get better-TIME IS BRAIN. Warning Signs of HEART ATTACK Call 911 if you have these symptoms: 
? Chest discomfort. Most heart attacks involve discomfort in the center of the chest that lasts more than a few minutes, or that goes away and comes back. It can feel like uncomfortable pressure, squeezing, fullness, or pain. ? Discomfort in other areas of the upper body. Symptoms can include pain or discomfort in one or both arms, the back, neck, jaw, or stomach. ? Shortness of breath with or without chest discomfort. ? Other signs may include breaking out in a cold sweat, nausea, or lightheadedness. Don't wait more than five minutes to call [address]! Fast action can save your life. Calling 911 is almost always the fastest way to get lifesaving treatment. Emergency Medical Services staff can begin treatment when they arrive  up to an hour sooner than if someone gets to the hospital by car. The discharge information has been reviewed with the patient. The patient verbalized understanding. Discharge medications reviewed with the patient and appropriate educational materials and side effects teaching were provided. WappZapp Activation Thank you for requesting access to Wapp[employer]app. Please follow the instructions below to securely access and download your online medical record. WappZapp allows you to send messages to your doctor, view your test results, renew your prescriptions, schedule appointments, and more. How Do I Sign Up? 1. In your internet browser, go to [email or web address] 
2. Click on the First Time User? Click Here link in the Sign In box. You will be redirect to the New Member Sign Up page. 3. Enter your W[employer]app Access Code exactly as it appears below. You will not need to use this code after youve completed the sign-up process. If you do not sign up before the expiration date, you must request a new code. [email or web address] Access Code: Activation code not generated Current [employer] Status: Patient Declined (This is the date your [employer] access code will ) 4. Enter the last four digits of your Social Security Number (xxxx) and Date of Birth (mm/dd/yyyy) as indicated and click Submit. You will be taken to the next sign-up page. 5. Create a gauzzt ID. This will be your [employer] login ID and cannot be changed, so think of one that is secure and easy to remember. 6. Create a [employer] password. You can change your password at any time. 7. Enter your Password Reset Question and Answer. This can be used at a later time if you forget your password. 8. Enter your e-mail address. You will receive e-mail notification when new information is available in [address] 9. Click Sign Up. You can now view and download portions of your medical record. 10. Click the Download Summary menu link to download a portable copy of your medical information. Additional Information If you have questions, please visit the Frequently Asked Questions section of the [employer] website at [email or web address] Remember, [employer] is NOT to be used for urgent needs. For medical emergencies, dial 911. Discharge Instructions Attachments/References ABSCESS: SKIN (ENGLISH) CELLULITIS (ENGLISH) SURGERY: GENERIC: POST-OP (ENGLISH) Discharge Orders None [employer] Announcement We are excited to announce that we are making your provider's discharge notes available to you in [employer]. You will see these notes when they are completed and signed by the physician that discharged you from your recent hospital stay. If you have any questions or concerns about any information you see in [employer], please call the Health Information Department where you were seen or reach out to your Primary Care Provider for more information about your plan of care. General Information Please provide this summary of care documentation to your next provider. Patient Signature:  ____________________________________________________________ Date:  ____________________________________________________________  
  
Zohra Jackson Provider Signature:  ____________________________________________________________ Date:  ____________________________________________________________ More Information Skin Abscess: Care Instructions Your Care Instructions A skin abscess is a bacterial infection that forms a pocket of pus. A boil is a kind of skin abscess. The doctor may have cut an opening in the abscess so that the pus can drain out. You may have gauze in the cut so that the abscess will stay open and keep draining. You may need antibiotics. You will need to follow up with your doctor to make sure the infection has gone away. The doctor has checked you carefully, but problems can develop later. If you notice any problems or new symptoms, get medical treatment right away. Follow-up care is a key part of your treatment and safety. Be sure to make and go to all appointments, and call your doctor if you are having problems. It's also a good idea to know your test results and keep a list of the medicines you take. How can you care for yourself at home? · Apply warm and dry compresses, a heating pad set on low, or a hot water bottle 3 or 4 times a day for pain. Keep a cloth between the heat source and your skin. · If your doctor prescribed antibiotics, take them as directed. Do not stop taking them just because you feel better. You need to take the full course of antibiotics. · Take pain medicines exactly as directed. ¨ If the doctor gave you a prescription medicine for pain, take it as prescribed. ¨ If you are not taking a prescription pain medicine, ask your doctor if you can take an over-the-counter medicine. · Keep your bandage clean and dry. Change the bandage whenever it gets wet or dirty, or at least one time a day. · If the abscess was packed with gauze: 
¨ Keep follow-up appointments to have the gauze changed or removed. If the doctor instructed you to remove the gauze, gently pull out all of the gauze when your doctor tells you to. ¨ After the gauze is removed, soak the area in warm water for 15 to 20 minutes 2 times a day, until the wound closes. When should you call for help? Call your doctor now or seek immediate medical care if: 
· You have signs of worsening infection, such as: 
¨ Increased pain, swelling, warmth, or redness. ¨ Red streaks leading from the infected skin. ¨ Pus draining from the wound. ¨ A fever. Watch closely for changes in your health, and be sure to contact your doctor if: 
· You do not get better as expected. Where can you learn more? Go to http://rick-kathya.info/. Enter X285 in the search box to learn more about \"Skin Abscess: Care Instructions. \" Current as of: October 13, 2016 Content Version: 11.2 © 5532-8526 [employer]. Care instructions adapted under license by PredictAd (which disclaims liability or warranty for this information). If you have questions about a medical condition or this instruction, always ask your healthcare professional. [place] [address] any warranty or liability for your use of this information. Cellulitis: Care Instructions Your Care Instructions Cellulitis is a skin infection. It often occurs after a break in the skin from a scrape, cut, bite, or puncture, or after a rash. The doctor has checked you carefully, but problems can develop later. If you notice any problems or new symptoms, get medical treatment right away. Follow-up care is a key part of your treatment and safety.  Be sure to make and go to all appointments, and call your doctor if you are having problems. It's also a good idea to know your test results and keep a list of the medicines you take. How can you care for yourself at home? · Take your antibiotics as directed. Do not stop taking them just because you feel better. You need to take the full course of antibiotics. · Prop up the infected area on pillows to reduce pain and swelling. Try to keep the area above the level of your heart as often as you can. · If your doctor told you how to care for your wound, follow your doctor's instructions. If you did not get instructions, follow this general advice: ¨ Wash the wound with clean water 2 times a day. Don't use hydrogen peroxide or alcohol, which can slow healing. ¨ You may cover the wound with a thin layer of petroleum jelly, such as Vaseline, and a nonstick bandage. ¨ Apply more petroleum jelly and replace the bandage as needed. · Be safe with medicines. Take pain medicines exactly as directed. ¨ If the doctor gave you a prescription medicine for pain, take it as prescribed. ¨ If you are not taking a prescription pain medicine, ask your doctor if you can take an over-the-counter medicine. To prevent cellulitis in the future · Try to prevent cuts, scrapes, or other injuries to your skin. Cellulitis most often occurs where there is a break in the skin. · If you get a scrape, cut, mild burn, or bite, wash the wound with clean water as soon as you can to help avoid infection. Don't use hydrogen peroxide or alcohol, which can slow healing. · If you have swelling in your legs (edema), support stockings and good skin care may help prevent leg sores and cellulitis. · Take care of your feet, especially if you have diabetes or other conditions that increase the risk of infection. Wear shoes and socks. Do not go barefoot. If you have athlete's foot or other skin problems on your feet, talk to your doctor about how to treat them. When should you call for help? Call your doctor now or seek immediate medical care if: 
· You have signs that your infection is getting worse, such as: 
¨ Increased pain, swelling, warmth, or redness. ¨ Red streaks leading from the area. ¨ Pus draining from the area. ¨ A fever. · You get a rash. Watch closely for changes in your health, and be sure to contact your doctor if: 
· You are not getting better after 1 day (24 hours). · You do not get better as expected. Where can you learn more? Go to http://rick-kathya.info/. Carisa Quails in the search box to learn more about \"Cellulitis: Care Instructions. \" Current as of: October 13, 2016 Content Version: 11.2 © 5386-7402 [employer]. Care instructions adapted under license by takokat (which disclaims liability or warranty for this information). If you have questions about a medical condition or this instruction, always ask your healthcare professional. [place] [address] any warranty or liability for your use of this information. Surgery: What to Expect at [facility] Your Recovery This care sheet gives you a general idea about how long it will take for you to recover from your surgery. But each person recovers at a different pace. How can you care for yourself at home? Activity · Allow your body to heal. Don't move quickly or lift anything heavy until you are feeling better. · Rest when you feel tired. · Your doctor may give you specific instructions on when you can do your normal activities again, such as driving and going back to work. · Be active. Walking is a good choice. Diet · You can eat your normal diet when you feel well. If your stomach is upset, try bland, low-fat foods like plain rice, broiled chicken, toast, and yogurt. · If your bowel movements are not regular right after surgery, try to avoid constipation and straining. Drink plenty of water.  Your doctor may suggest fiber, a stool softener, or a mild laxative. Medicines · Your doctor will tell you if and when you can restart your medicines. He or she will also give you instructions about taking any new medicines. · If you take blood thinners, such as warfarin (Coumadin), clopidogrel (Plavix), or aspirin, be sure to talk to your doctor. He or she will tell you if and when to start taking those medicines again. Make sure that you understand exactly what your doctor wants you to do. · Be safe with medicines. Read and follow all instructions on the label. ¨ If the doctor gave you a prescription medicine for pain, take it as prescribed. ¨ If you are not taking a prescription pain medicine, ask your doctor if you can take an over-the-counter medicine. Incision care · You will have a dressing over the cut (incision). A dressing helps the incision heal and protects it. Your doctor will tell you how to take care of this. · If you have strips of tape on the cut the doctor made, leave the tape on for a week or until it falls off. · If you had stitches, your doctor will tell you when to come back to have them removed. · If you have skin adhesive on the cut, leave it on until it falls off. Skin adhesive is also called liquid stitches. · Change the bandage every day. · Wash the area daily with warm, soapy water, and pat it dry. Don't use hydrogen peroxide or alcohol. They can slow healing. · You may cover the area with a gauze bandage if it oozes fluid or rubs against clothing. · You may shower 24 to 48 hours after surgery. Pat the incision dry. Don't swim or take a bath for the first 2 weeks, or until your doctor tells you it is okay. Follow-up care is a key part of your treatment and safety. Be sure to make and go to all appointments, and call your doctor if you are having problems. It's also a good idea to know your test results and keep a list of the medicines you take. When should you call for help? Call 911 anytime you think you may need emergency care. For example, call if: 
· You passed out (lost consciousness). · You have severe trouble breathing. · You have sudden chest pain and shortness of breath, or you cough up blood. Call your doctor now or seek immediate medical care if: 
· You have pain that does not get better after you take pain medicine. · You have loose stitches, or your incision comes open. · You are bleeding through your dressing. A small amount of blood is normal. 
· You have signs of infection, such as: 
¨ Increased pain, swelling, warmth, or redness. ¨ Red streaks leading from the incision. ¨ Pus draining from the incision. ¨ A fever. · You have symptoms of a blood clot in your arm or leg (called a deep vein thrombosis). These may include: 
¨ Pain in your calf, back of the knee, thigh, or groin. ¨ Redness and swelling in the arm, leg, or groin. Watch closely for any changes in your health, and be sure to contact your doctor if: 
· You do not have a bowel movement after taking a laxative. Where can you learn more? Go to http://rick-kathya.info/. Enter W948 in the search box to learn more about \"Surgery: What to Expect at Home. \" Current as of: August 14, 2016 Content Version: 11.2 © 1587-5937 [employer]. Care instructions adapted under license by GridPoint (which disclaims liability or warranty for this information). If you have questions about a medical condition or this instruction, always ask your healthcare professional. [place] [address] any warranty or liability for your use of this information.

## 2017-04-12 NOTE — ED TRIAGE NOTES
Cellulitis in left foot, and left hand, continued coughing, on home oxygen 2L, having continued cough.

## 2017-04-13 PROBLEM — L02.519 CELLULITIS AND ABSCESS OF HAND: Status: ACTIVE | Noted: 2017-04-13

## 2017-04-13 PROBLEM — L03.119 CELLULITIS AND ABSCESS OF HAND: Status: ACTIVE | Noted: 2017-04-13

## 2017-04-13 PROBLEM — L02.619 CELLULITIS AND ABSCESS OF FOOT: Status: ACTIVE | Noted: 2017-04-13

## 2017-04-13 PROBLEM — L03.119 CELLULITIS AND ABSCESS OF FOOT: Status: ACTIVE | Noted: 2017-04-13

## 2017-04-13 LAB
EST. AVERAGE GLUCOSE BLD GHB EST-MCNC: 263 MG/DL
GLUCOSE BLD STRIP.AUTO-MCNC: 252 MG/DL (ref 70–110)
GLUCOSE BLD STRIP.AUTO-MCNC: 299 MG/DL (ref 70–110)
GLUCOSE BLD STRIP.AUTO-MCNC: 307 MG/DL (ref 70–110)
GLUCOSE BLD STRIP.AUTO-MCNC: 336 MG/DL (ref 70–110)
GLUCOSE BLD STRIP.AUTO-MCNC: 348 MG/DL (ref 70–110)
GLUCOSE BLD STRIP.AUTO-MCNC: 374 MG/DL (ref 70–110)
HBA1C MFR BLD: 10.8 % (ref 4.2–5.6)

## 2017-04-13 PROCEDURE — 74011250637 HC RX REV CODE- 250/637: Performed by: HOSPITALIST

## 2017-04-13 PROCEDURE — 65270000029 HC RM PRIVATE

## 2017-04-13 PROCEDURE — 74011636637 HC RX REV CODE- 636/637: Performed by: HOSPITALIST

## 2017-04-13 PROCEDURE — 74011000250 HC RX REV CODE- 250: Performed by: HOSPITALIST

## 2017-04-13 PROCEDURE — 74011000258 HC RX REV CODE- 258: Performed by: HOSPITALIST

## 2017-04-13 PROCEDURE — 74011250636 HC RX REV CODE- 250/636: Performed by: EMERGENCY MEDICINE

## 2017-04-13 PROCEDURE — 74011250636 HC RX REV CODE- 250/636: Performed by: HOSPITALIST

## 2017-04-13 PROCEDURE — 82962 GLUCOSE BLOOD TEST: CPT

## 2017-04-13 RX ORDER — HYDROCODONE BITARTRATE AND ACETAMINOPHEN 5; 325 MG/1; MG/1
1 TABLET ORAL
Status: DISCONTINUED | OUTPATIENT
Start: 2017-04-13 | End: 2017-04-20 | Stop reason: HOSPADM

## 2017-04-13 RX ORDER — ERGOCALCIFEROL 1.25 MG/1
50000 CAPSULE ORAL DAILY
Status: DISCONTINUED | OUTPATIENT
Start: 2017-04-13 | End: 2017-04-20 | Stop reason: HOSPADM

## 2017-04-13 RX ORDER — EPLERENONE 25 MG/1
25 TABLET, FILM COATED ORAL DAILY
Status: DISCONTINUED | OUTPATIENT
Start: 2017-04-13 | End: 2017-04-20 | Stop reason: HOSPADM

## 2017-04-13 RX ORDER — DIVALPROEX SODIUM 250 MG/1
250 TABLET, DELAYED RELEASE ORAL
Status: DISCONTINUED | OUTPATIENT
Start: 2017-04-13 | End: 2017-04-20 | Stop reason: HOSPADM

## 2017-04-13 RX ORDER — ARIPIPRAZOLE 10 MG/1
10 TABLET ORAL DAILY
Status: DISCONTINUED | OUTPATIENT
Start: 2017-04-13 | End: 2017-04-20 | Stop reason: HOSPADM

## 2017-04-13 RX ORDER — IPRATROPIUM BROMIDE AND ALBUTEROL SULFATE 2.5; .5 MG/3ML; MG/3ML
3 SOLUTION RESPIRATORY (INHALATION)
Status: DISCONTINUED | OUTPATIENT
Start: 2017-04-13 | End: 2017-04-20 | Stop reason: HOSPADM

## 2017-04-13 RX ORDER — DEXTROSE 50 % IN WATER (D50W) INTRAVENOUS SYRINGE
25-50 AS NEEDED
Status: DISCONTINUED | OUTPATIENT
Start: 2017-04-13 | End: 2017-04-20 | Stop reason: HOSPADM

## 2017-04-13 RX ORDER — POTASSIUM CHLORIDE 1.5 G/1.77G
20 POWDER, FOR SOLUTION ORAL DAILY
Status: DISCONTINUED | OUTPATIENT
Start: 2017-04-13 | End: 2017-04-20 | Stop reason: HOSPADM

## 2017-04-13 RX ORDER — NALOXONE HYDROCHLORIDE 0.4 MG/ML
0.4 INJECTION, SOLUTION INTRAMUSCULAR; INTRAVENOUS; SUBCUTANEOUS AS NEEDED
Status: DISCONTINUED | OUTPATIENT
Start: 2017-04-13 | End: 2017-04-20 | Stop reason: HOSPADM

## 2017-04-13 RX ORDER — METOLAZONE 2.5 MG/1
2.5 TABLET ORAL
Status: DISCONTINUED | OUTPATIENT
Start: 2017-04-16 | End: 2017-04-20 | Stop reason: HOSPADM

## 2017-04-13 RX ORDER — ASPIRIN 81 MG/1
81 TABLET ORAL DAILY
Status: DISCONTINUED | OUTPATIENT
Start: 2017-04-13 | End: 2017-04-20 | Stop reason: HOSPADM

## 2017-04-13 RX ORDER — CLOPIDOGREL BISULFATE 75 MG/1
75 TABLET ORAL DAILY
Status: DISCONTINUED | OUTPATIENT
Start: 2017-04-13 | End: 2017-04-13

## 2017-04-13 RX ORDER — ROSUVASTATIN CALCIUM 5 MG/1
5 TABLET, COATED ORAL
Status: DISCONTINUED | OUTPATIENT
Start: 2017-04-13 | End: 2017-04-20 | Stop reason: HOSPADM

## 2017-04-13 RX ORDER — INSULIN GLARGINE 100 [IU]/ML
40 INJECTION, SOLUTION SUBCUTANEOUS EVERY 12 HOURS
Status: DISCONTINUED | OUTPATIENT
Start: 2017-04-13 | End: 2017-04-15

## 2017-04-13 RX ORDER — ONDANSETRON 2 MG/ML
4 INJECTION INTRAMUSCULAR; INTRAVENOUS
Status: DISCONTINUED | OUTPATIENT
Start: 2017-04-13 | End: 2017-04-20 | Stop reason: HOSPADM

## 2017-04-13 RX ORDER — INSULIN LISPRO 100 [IU]/ML
INJECTION, SOLUTION INTRAVENOUS; SUBCUTANEOUS
Status: DISCONTINUED | OUTPATIENT
Start: 2017-04-13 | End: 2017-04-20 | Stop reason: HOSPADM

## 2017-04-13 RX ORDER — DIPHENHYDRAMINE HCL 25 MG
25 CAPSULE ORAL
Status: DISCONTINUED | OUTPATIENT
Start: 2017-04-13 | End: 2017-04-15

## 2017-04-13 RX ORDER — LEVOTHYROXINE SODIUM 75 UG/1
75 TABLET ORAL
Status: DISCONTINUED | OUTPATIENT
Start: 2017-04-13 | End: 2017-04-20 | Stop reason: HOSPADM

## 2017-04-13 RX ORDER — FUROSEMIDE 20 MG/1
20 TABLET ORAL DAILY
Status: DISCONTINUED | OUTPATIENT
Start: 2017-04-13 | End: 2017-04-13

## 2017-04-13 RX ORDER — ACETAMINOPHEN 325 MG/1
650 TABLET ORAL
Status: DISCONTINUED | OUTPATIENT
Start: 2017-04-13 | End: 2017-04-15

## 2017-04-13 RX ORDER — SODIUM CHLORIDE 9 MG/ML
125 INJECTION, SOLUTION INTRAVENOUS CONTINUOUS
Status: DISCONTINUED | OUTPATIENT
Start: 2017-04-13 | End: 2017-04-20 | Stop reason: HOSPADM

## 2017-04-13 RX ORDER — ACETAMINOPHEN 325 MG/1
325 TABLET ORAL
Status: DISCONTINUED | OUTPATIENT
Start: 2017-04-13 | End: 2017-04-13 | Stop reason: SDUPTHER

## 2017-04-13 RX ORDER — INSULIN GLARGINE 100 [IU]/ML
60 INJECTION, SOLUTION SUBCUTANEOUS DAILY
Status: DISCONTINUED | OUTPATIENT
Start: 2017-04-13 | End: 2017-04-13

## 2017-04-13 RX ORDER — ALBUTEROL SULFATE 0.83 MG/ML
2.5 SOLUTION RESPIRATORY (INHALATION)
Status: DISCONTINUED | OUTPATIENT
Start: 2017-04-13 | End: 2017-04-13

## 2017-04-13 RX ORDER — BUPRENORPHINE HYDROCHLORIDE AND NALOXONE HYDROCHLORIDE DIHYDRATE 2; .5 MG/1; MG/1
1 TABLET SUBLINGUAL DAILY
Status: COMPLETED | OUTPATIENT
Start: 2017-04-13 | End: 2017-04-15

## 2017-04-13 RX ORDER — MAGNESIUM SULFATE 100 %
4 CRYSTALS MISCELLANEOUS AS NEEDED
Status: DISCONTINUED | OUTPATIENT
Start: 2017-04-13 | End: 2017-04-20 | Stop reason: HOSPADM

## 2017-04-13 RX ORDER — MORPHINE SULFATE 2 MG/ML
1 INJECTION, SOLUTION INTRAMUSCULAR; INTRAVENOUS
Status: DISCONTINUED | OUTPATIENT
Start: 2017-04-13 | End: 2017-04-20 | Stop reason: HOSPADM

## 2017-04-13 RX ORDER — SERTRALINE HYDROCHLORIDE 50 MG/1
50 TABLET, FILM COATED ORAL DAILY
Status: DISCONTINUED | OUTPATIENT
Start: 2017-04-13 | End: 2017-04-20 | Stop reason: HOSPADM

## 2017-04-13 RX ORDER — PREDNISONE 10 MG/1
5 TABLET ORAL DAILY
Status: DISCONTINUED | OUTPATIENT
Start: 2017-04-13 | End: 2017-04-19

## 2017-04-13 RX ORDER — MORPHINE SULFATE 2 MG/ML
2 INJECTION, SOLUTION INTRAMUSCULAR; INTRAVENOUS
Status: DISCONTINUED | OUTPATIENT
Start: 2017-04-13 | End: 2017-04-13

## 2017-04-13 RX ORDER — TRAZODONE HYDROCHLORIDE 100 MG/1
100 TABLET ORAL
Status: DISCONTINUED | OUTPATIENT
Start: 2017-04-13 | End: 2017-04-20 | Stop reason: HOSPADM

## 2017-04-13 RX ORDER — DIPHENHYDRAMINE HCL 25 MG
25 CAPSULE ORAL
Status: DISCONTINUED | OUTPATIENT
Start: 2017-04-13 | End: 2017-04-13

## 2017-04-13 RX ORDER — GABAPENTIN 300 MG/1
300 CAPSULE ORAL 3 TIMES DAILY
Status: DISCONTINUED | OUTPATIENT
Start: 2017-04-13 | End: 2017-04-20 | Stop reason: HOSPADM

## 2017-04-13 RX ORDER — HEPARIN SODIUM 5000 [USP'U]/ML
5000 INJECTION, SOLUTION INTRAVENOUS; SUBCUTANEOUS EVERY 8 HOURS
Status: DISCONTINUED | OUTPATIENT
Start: 2017-04-13 | End: 2017-04-13

## 2017-04-13 RX ORDER — IPRATROPIUM BROMIDE AND ALBUTEROL SULFATE 2.5; .5 MG/3ML; MG/3ML
3 SOLUTION RESPIRATORY (INHALATION)
Status: ACTIVE | OUTPATIENT
Start: 2017-04-13 | End: 2017-04-14

## 2017-04-13 RX ADMIN — INSULIN GLARGINE 40 UNITS: 100 INJECTION, SOLUTION SUBCUTANEOUS at 21:34

## 2017-04-13 RX ADMIN — DIVALPROEX SODIUM 250 MG: 250 TABLET, DELAYED RELEASE ORAL at 02:17

## 2017-04-13 RX ADMIN — CLOPIDOGREL BISULFATE 75 MG: 75 TABLET ORAL at 09:31

## 2017-04-13 RX ADMIN — BUPRENORPHINE AND NALOXONE 1 TABLET: 2; .5 TABLET SUBLINGUAL at 21:46

## 2017-04-13 RX ADMIN — ROSUVASTATIN CALCIUM 5 MG: 5 TABLET ORAL at 21:33

## 2017-04-13 RX ADMIN — TRAZODONE HYDROCHLORIDE 100 MG: 100 TABLET ORAL at 02:17

## 2017-04-13 RX ADMIN — INSULIN LISPRO 6 UNITS: 100 INJECTION, SOLUTION INTRAVENOUS; SUBCUTANEOUS at 07:58

## 2017-04-13 RX ADMIN — GABAPENTIN 300 MG: 300 CAPSULE ORAL at 21:33

## 2017-04-13 RX ADMIN — ERGOCALCIFEROL 50000 UNITS: 1.25 CAPSULE ORAL at 09:31

## 2017-04-13 RX ADMIN — LEVOTHYROXINE SODIUM 75 MCG: 75 TABLET ORAL at 07:57

## 2017-04-13 RX ADMIN — Medication 1 TABLET: at 09:31

## 2017-04-13 RX ADMIN — SODIUM CHLORIDE 75 ML/HR: 900 INJECTION, SOLUTION INTRAVENOUS at 21:53

## 2017-04-13 RX ADMIN — HEPARIN SODIUM 5000 UNITS: 5000 INJECTION, SOLUTION INTRAVENOUS; SUBCUTANEOUS at 02:19

## 2017-04-13 RX ADMIN — ARIPIPRAZOLE 10 MG: 10 TABLET ORAL at 09:31

## 2017-04-13 RX ADMIN — MEROPENEM 1 G: 1 INJECTION, POWDER, FOR SOLUTION INTRAVENOUS at 11:37

## 2017-04-13 RX ADMIN — VANCOMYCIN HYDROCHLORIDE 2000 MG: 5 INJECTION, POWDER, LYOPHILIZED, FOR SOLUTION INTRAVENOUS at 00:56

## 2017-04-13 RX ADMIN — ALBUTEROL SULFATE 2.5 MG: 2.5 SOLUTION RESPIRATORY (INHALATION) at 03:44

## 2017-04-13 RX ADMIN — POTASSIUM CHLORIDE 20 MEQ: 1.5 POWDER, FOR SOLUTION ORAL at 09:00

## 2017-04-13 RX ADMIN — HYDROCODONE BITARTRATE AND ACETAMINOPHEN 1 TABLET: 5; 325 TABLET ORAL at 10:56

## 2017-04-13 RX ADMIN — HYDROCODONE BITARTRATE AND ACETAMINOPHEN 1 TABLET: 5; 325 TABLET ORAL at 03:44

## 2017-04-13 RX ADMIN — Medication 1 MG: at 21:42

## 2017-04-13 RX ADMIN — DIVALPROEX SODIUM 250 MG: 250 TABLET, DELAYED RELEASE ORAL at 23:19

## 2017-04-13 RX ADMIN — MEROPENEM 1 G: 1 INJECTION, POWDER, FOR SOLUTION INTRAVENOUS at 21:33

## 2017-04-13 RX ADMIN — INSULIN GLARGINE 60 UNITS: 100 INJECTION, SOLUTION SUBCUTANEOUS at 09:34

## 2017-04-13 RX ADMIN — SERTRALINE HYDROCHLORIDE 50 MG: 50 TABLET ORAL at 09:31

## 2017-04-13 RX ADMIN — GABAPENTIN 300 MG: 300 CAPSULE ORAL at 09:31

## 2017-04-13 RX ADMIN — INSULIN LISPRO 10 UNITS: 100 INJECTION, SOLUTION INTRAVENOUS; SUBCUTANEOUS at 21:35

## 2017-04-13 RX ADMIN — ASPIRIN 81 MG: 81 TABLET, COATED ORAL at 09:30

## 2017-04-13 RX ADMIN — SODIUM CHLORIDE 75 ML/HR: 900 INJECTION, SOLUTION INTRAVENOUS at 02:19

## 2017-04-13 RX ADMIN — EPLERENONE 25 MG: 25 TABLET, FILM COATED ORAL at 09:31

## 2017-04-13 RX ADMIN — PREDNISONE 5 MG: 10 TABLET ORAL at 09:30

## 2017-04-13 RX ADMIN — INSULIN LISPRO 8 UNITS: 100 INJECTION, SOLUTION INTRAVENOUS; SUBCUTANEOUS at 11:37

## 2017-04-13 RX ADMIN — TRAZODONE HYDROCHLORIDE 100 MG: 100 TABLET ORAL at 21:33

## 2017-04-13 NOTE — ED NOTES
PT reports pain to left arm and leg, administered 1 tab Lortab per MD order, safety intact, will continue to monitor

## 2017-04-13 NOTE — ED NOTES
Hourly rounding-Pt resting on stretcher, side rails up, call bell in reach, vitals stable, pt updated on plan of care, no issues or complaints at this time.  Pt asking for food, pt is NPO for colonoscopy tomorrow am.

## 2017-04-13 NOTE — H&P
[facility]  ROUTINE H AND PS    Name:  Shaniqua Guardado  MR#:  [id number]  :  1955  Account #:  [de-identified]  Date of Adm:  2017      CHIEF COMPLAINT: Pain and swelling in her left hand and left foot. HISTORY OF PRESENT ILLNESS: The patient is a 81-year-old black  female who is an IV drug abuser and last used IV heroin and cocaine 2  days prior to admission for which she injects into her left hand and left  foot. She developed swelling  approximately 1 week ago that has  progressed and she now has intractable pain of the left hand and left  foot with swelling and erythema and she was evaluated by the  emergency room physician and the emergency room physician  discussed her care with Dr. Alta Mayo with orthopedic surgery, who said  that he would follow the patient as ortho consultant. The patient was  given vancomycin and meropenem in the emergency room, she was  noted with a blood sugar of 400 and the patient states that she did not  take her insulin today secondary to not taking in any food today. PAST MEDICAL HISTORY: Significant for diabetes mellitus insulin-  requiring. She has peripheral neuropathy, sarcoidosis. She has a  history of sickle cell trait. She has emphysema, hypothyroidism and  she has history of asthma, anxiety. She has a back injury after jumping  out of a second story window. She has a history of bilateral knee pain. She has bipolar disorder. She has chronic diastolic heart failure and  stage 3 chronic kidney disease. She has a history of COPD and she  suffers from depression and she has had some DJD of the knees,  essential hypertension and a history of frequent falls, generalized  fatigue, hep C positive and history of headaches, hiatal hernia. ALLERGIES: SHE HAS ALLERGIES TO MOXIFLOXACIN,  PENICILLIN CAUSES HIVES AND SULFA MEDICATIONS CAUSE  SWELLING. She was given meropenum in the emergency room and  has tolerated this medication thus far.     FAMILY HISTORY: She denies having any blood relatives first-degree  relatives that have problems with recurrent infections. SOCIAL HISTORY: She has a history of a pack of cigarettes per day  for 30 years. No alcohol abuse. She uses IV drugs, which she calls  speed ball, which she says involves cocaine and heroin injections and  she denies any sharing of needles. She states she gets her needles  from her diabetic supplies. She lives alone. Her  is Elizabeth Hazel, phone number is [phone]. CODE STATUS: FULL CODE. REVIEW OF SYSTEMS  She states she has 8/10 pain in her left foot and left hand, she has had  swelling, erythema and swelling and pain have gotten progressively  worse over the week. She also was concerned about her breathing. She has wheezing and feels congested. She states she has produced  some white phlegm. Her remaining 14-point review of systems is  negative. PHYSICAL EXAMINATION  She is well-developed, well-nourished. She is in no distress. VITAL SIGNS: Her temperature was 98.2, pulse 80, respirations were  16, blood pressure is 118/74, O2 saturations were 96% on 2 L nasal  cannula. SKIN: Without jaundice or pallor. HEENT: She is normocephalic, atraumatic. Pupils equally round, react  to light and accommodation. Extraocular movements intact. Sclerae  anicteric. Oral cavity was moist.  NECK: NECK: Supple. No JVD, carotid bruits, or goiter. No cervical  lymphadenopathy. LUNGS: Have breath sounds bilaterally. No wheezing or rales. No  areas of consolidation. HEART: Regular rate and rhythm. No S3 gallop, murmur, or rub. ABDOMEN: Soft. No masses. No hepatosplenomegaly. No ascites. EXTREMITIES: She had swelling and erythema in the left upper and  distal left upper and distal left lower extremity with swelling of her foot  and some edema up to her knees her left hand and had some swelling  as well. NEUROLOGICAL: She was alert, oriented x3. Her speech had a  regular rate and rhythm. She cooperated with exam, followed my  commands. She had no focal neurologic deficits on exam.    LABORATORY DATA: Her white count was 7.2, hemoglobin was 13.7,  hematocrit was 40.6, platelets at 954. Sodium 135, potassium 3.7,  chloride was 95, CO2 was 33, glucose was ~ 400. Her chest x-ray showed  no evidence of acute infiltrate or effusion, changes consistent with  COPD. X-ray of her left foot showed soft tissue swelling by wet read. No obvious osteo. In left hand, similar type findings, soft tissue swelling  and no obvious osteo by wet read. IMPRESSION AND PLAN:  1. Cellulitis and abscess of left upper and lower extremities at the distal  limbs. The patient will be admitted for IV antibiotic therapy. Will follow  with culture results. 2. Intravenous drug abuse with cocaine and heroin. I discussed with  the patient the need for cessation of IV drug abuse. The patient will be  given analgesics with morphine for severe pain only. 3. Chronic obstructive pulmonary disease. Will treat with  bronchodilators with DuoNebs every 4 hours p.r.n. shortness of breath  or wheezing. 4. Uncontrolled diabetes mellitus type 2. Will resume her insulin that  she held today and will monitor fingerstick blood sugars and will give  additional insulin via the [facility] sliding scale standing orders. 5. Tobacco abuse. I spent time with the patient in the emergency room  discussing need for smoking cessation.  The patient will need to refrain  from smoking tobacco.        MD [facility], [facility].  D:  04/13/2017   02:36  T:  04/13/2017   04:54  Job #:  [id number]

## 2017-04-13 NOTE — CDMP QUERY
In order to reflect severity of illness.  please document if pt  Has dx of Chronic Resp.failure w/hypoxia  -pt on home O2 24hr every day    Thank you,GEOFFREYMP

## 2017-04-13 NOTE — ED NOTES
Assisted PT to use bedside commode, voided without difficulty, safety intact, will continue to monitor

## 2017-04-13 NOTE — ED NOTES
TRANSFER - OUT REPORT:    Verbal report given to JENNY King (name) on Zohra Hauser 16  being transferred to [address] [facility] room 455 (unit) for routine progression of care       Report consisted of patients Situation, Background, Assessment and   Recommendations(SBAR). Information from the following report(s) SBAR, ED Summary, [facility] and Recent Results was reviewed with the receiving nurse. Lines:   Peripheral IV 04/12/17 Left Forearm (Active)   Site Assessment Clean, dry, & intact 4/12/2017  9:20 PM   Phlebitis Assessment 0 4/12/2017  9:20 PM   Infiltration Assessment 0 4/12/2017  9:20 PM        Opportunity for questions and clarification was provided.       Patient transported with:   Monitor  Tech   Oxygen 2L

## 2017-04-13 NOTE — ED NOTES
Received bedside report from Taylor Coe, [address], PT resting in bed with NS infusing, Ax4, no needs voiced at this time, awaiting inpatient bed assigned upstairs, safety intact, will continue to monitor

## 2017-04-13 NOTE — ED NOTES
Spoke to Dr. Mindy Mann regarding U-100 insulin order, Dr. Mindy Mann adjusting orders, safety intact, will continue to monitor

## 2017-04-13 NOTE — ED PROVIDER NOTES
HPI Comments: Pt with history of DM, peripheral neuropathy, sarcoidosis, O2 dependence, sickle cell trait and chronic diastolic heart failure, presents to ED with complaint of increased pain and swelling to her L hand and foot for the past week. She has not seen her PMD for symptoms. She initially states \"I guess I got bit by something\" but on further questioning notes that she has been injecting cocaine into her hand and foot. She states she normally checks her blood sugar 3 times a day and levels have been \"fine\" but she didn't check her blood sugar or take her insulin today \"because I wasn't hungry and I didn't want to take my medicines\". She denies any fevers or chills. No nausea or vomiting, no chest or abdominal pain, no headache, no neck or back pain. She denies any recent abx use. No other acute symptoms or complaints were noted. Past Medical History:   Diagnosis Date    Anxiety     Asthma     Back injury 1986    jumped out of second story window    Behavioral change     Bilateral knee pain     Bipolar disorder (HCC)     Chronic airway obstruction, not elsewhere classified     SOB, on paula O2 Recent admission with psychosis    Chronic back pain     Chronic diastolic heart failure (HCC)     Stable on diuretics    Chronic kidney disease     stage III    Congestive heart failure (HCC)     COPD (chronic obstructive pulmonary disease) ([facility] [address] 10/31/2012    Cramps, muscle, general     Depression     Diabetes mellitus     Difficulty speaking     Difficulty walking     Difficulty writing     DJD (degenerative joint disease) of knee     bilateral, mild    Edema     The edema involves both lower extremities. The episodes last for 1 week. The patient describes this as worsening. Symptoms are exacerbated by prolonged sitting. Symptoms are relieved by leg elevation and diuretics. NO CHANGE    Essential hypertension, benign     Better controlled    Falls frequently     Fatigue     Generalized stiffness     Headache     Heartburn     Hiatal hernia     History of hepatitis C     treated    Hoarseness of voice     Hypothyroidism     Lung disease     Memory difficulty     Morbid obesity (HCC)     Multiple joint pain     Nausea     Numbness and tingling     arms and legs, related to diabetic neuropathy    Osteoarthritis of left knee     Other and unspecified hyperlipidemia     LDL reportedly very high/started on crestor recently by diabetic doctor    Oxygen dependent     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     Sarcoidosis ([facility] [address]     Sickle cell trait ([facility] [address]     Sinusitis     SOB (shortness of breath)     STD (female)     herpes    Swelling of body region     legs and feet    Tinnitus     Venous insufficiency     Vertigo     Weakness generalized     Wears glasses     Weight gain        Past Surgical History:   Procedure Laterality Date    HX BACK SURGERY      HX  SECTION      HX TUBAL LIGATION           Family History:   Problem Relation Age of Onset    Seizures Other     Diabetes Mother     Hypertension Mother     Heart Disease Mother     Cancer Mother     Diabetes Father     Stroke Father     Heart Disease Father     Headache Sister     Depression Neg Hx     Suicide Neg Hx     Psychotic Disorder Neg Hx     Substance Abuse Neg Hx     Dementia Neg Hx        Social History     Social History    Marital status: SINGLE     Spouse name: N/A    Number of children: N/A    Years of education: N/A     Occupational History    Not employed Not Employed     Social History Main Topics    Smoking status: Former Smoker     Packs/day: 0.50     Years: 30.00     Types: Cigarettes     Start date: 1969     Quit date: 10/31/2016    Smokeless tobacco: Never Used      Comment: Per pt.  10 a day     Alcohol use No    Drug use: No      Comment: +Cocaine Screen     Sexual activity: Not on file      Comment: 2014     Other Topics Concern    Not on file     Social History Narrative    Lives with 15year old son. ALLERGIES: Moxifloxacin; Pcn [penicillins]; and Sulfa (sulfonamide antibiotics)    Review of Systems   Constitutional: Negative for chills, diaphoresis and fever. HENT: Negative. Eyes: Negative for visual disturbance. Respiratory: Negative for chest tightness and shortness of breath. Cardiovascular: Negative for chest pain, palpitations and leg swelling. Gastrointestinal: Negative for abdominal pain, constipation, diarrhea, nausea and vomiting. Endocrine: Negative. Genitourinary: Negative for dysuria and hematuria. Musculoskeletal: Positive for arthralgias. Negative for back pain, gait problem, neck pain and neck stiffness. Skin: Positive for rash and wound. Allergic/Immunologic: Negative. Neurological: Negative for dizziness, syncope, weakness, numbness and headaches. Hematological: Does not bruise/bleed easily. Vitals:    04/12/17 1413 04/12/17 1916   BP: 114/79 104/75   Pulse: 93 80   Resp: 20 16   Temp: 98.2 °F (36.8 °C) 98.2 °F (36.8 °C)   SpO2: 95% 92%   Weight: 154.2 kg (340 lb)    Height: 5' 5\" (1.651 m)             Physical Exam   Constitutional: She is oriented to person, place, and time. She appears well-developed and well-nourished. No distress. Resting comfortably on stretcher   HENT:   Head: Normocephalic and atraumatic. Mouth/Throat: Oropharynx is clear and moist.   MM moist   Eyes: Conjunctivae and EOM are normal. No scleral icterus. Sclera clear bilaterally   Neck: Normal range of motion. Neck supple. No JVD present. Non-tender to palpation   Cardiovascular: Normal rate, regular rhythm and normal heart sounds. Exam reveals no gallop and no friction rub. No murmur heard. Pulmonary/Chest: Effort normal and breath sounds normal. No respiratory distress. She has no wheezes. She has no rales. She exhibits no tenderness. No crepitance with palpation   Abdominal: Soft.  Bowel sounds are normal. She exhibits no distension. There is no tenderness. There is no rebound and no guarding. Genitourinary:   Genitourinary Comments: No CVA tenderness   Musculoskeletal: She exhibits edema. She exhibits no tenderness. Normal inspection of upper extremities. No edema noted to bilateral lower extremities. Induration and erythema noted to dorsal aspect of L hand, no palmar or deep space tenderness. Normal ROM to all MCPs and wrist on L. Good distal cap refill and intact sensation. Lymphadenopathy:     She has no cervical adenopathy. Neurological: She is alert and oriented to person, place, and time. No cranial nerve deficit. She exhibits normal muscle tone. Normal motor and sensation bilaterally to upper and lower extremities   Skin: Skin is warm and dry. She is not diaphoretic. There is erythema. Psychiatric:   Normal mood and affect. Vitals reviewed. MDM  Number of Diagnoses or Management Options  Cellulitis of left lower extremity:   Cellulitis of left upper extremity:   Chronic obstructive pulmonary disease, unspecified COPD type ([facility] [address]):   Cocaine abuse:   Hyperglycemia:   Diagnosis management comments: Pt with hand and foot cellulitis and hyperglycemia after injecting cocaine into her hand and foot and not taking her insulin for 1 day only by report. VS are reassuring. Will XR hand and foot for FB, start abx. No evidence of deep space hand infection on exam.  Anticipate admission. Discussed with Dr. Blanche Wilcox, will consult on pt. Requests medicine admission. Discussed with Dr. Giovanna Moreno, will admit. Pt in agreement with admission plans.        Amount and/or Complexity of Data Reviewed  Clinical lab tests: ordered  Tests in the radiology section of CPT®: ordered  Tests in the medicine section of CPT®: ordered  Decide to obtain previous medical records or to obtain history from someone other than the patient: yes  Obtain history from someone other than the patient: yes  Independent visualization of images, tracings, or specimens: yes    Risk of Complications, Morbidity, and/or Mortality  Presenting problems: moderate  Management options: moderate      ED Course       Procedures    XR foot:  No FB appreciated    XR hand:  No FB appreciated

## 2017-04-13 NOTE — ED NOTES
Hourly rounding-Pt resting on stretcher, side rails up, call bell in reach, vitals stable, pt updated on plan of care, no issues or complaints at this time.  Pt continues to ask for food

## 2017-04-13 NOTE — PROGRESS NOTES
THERESA RODRIGUEZ Roots with IVDA left hand dorsum and to left foot dorsum    1. MRI LEFT HAND/MRI LEFT FOOT: wet read to be called to me. 2. NPO AFTER MN  3. Elevate Left hand  and left foot  4. Will see if I can stop Plavix and Heparin.  Will DC (as I spoke with Dr Anant Orellana)    Diane Mckeon MD  4/13/2017  7:42 PM

## 2017-04-13 NOTE — PROGRESS NOTES
Hospitalist Progress Note    Patient: Lina Ragsdale MRN: [id number]  CSN: [id number]    YOB: 1955  Age: 62 y.o. Sex: female    DOA: 4/12/2017 LOS:  LOS: 1 day          still having pain and swelling to left hand and left foot. She admits to active ivdu, uses 2 - 3 caps heroin daily. States she is withdrawing. She admits to injecting to left hand and left foot. Assessment/Plan     1. Cellulitis and abscess of left hand and left foot in the setting of ivdu. Dr. Rachel Yusuf to consult. Blood cx (4/11) ngtd. Glucose control. 2. Ivdu with cocaine and heroin. Avoid beta blockade. Will inquire regarding HIV. 3. COPD - mild wheezing. Spiriva, duoneb now. Close monitoring. 4. DM2. lantus ssi. a1c 10.8. Diabetes educator worked with her. 5. Tobacco abuse. Smoking cessation medication. 6. Echo 11/1/16 EF 55%, no obvious WMA. Moderate concentric hypertrophy. Grade 1 DD  7. Morbid obesity Body mass index is 56.58 kg/(m^2). 8. Full code. I discussed the case with Dr. Rachel Yusuf. Additional Notes:      Case discussed with:  [x]Patient  []Family  []Nursing  []Case Management  DVT Prophylaxis:  []Lovenox  []Hep SQ  []SCDs  []Coumadin   []On Heparin gtt    Vital signs/Intake and Output:  Visit Vitals    BP 98/54    Pulse 80    Temp 98.2 °F (36.8 °C)    Resp 16    Ht 5' 5\" (1.651 m)    Wt 154.2 kg (340 lb)    SpO2 94%    BMI 56.58 kg/m2     Current Shift:     Last three shifts:  04/11 1901 - 04/13 0700  In: 1200 [P.O.:1200]  Out: 500 [Urine:500]    Awake alert and oriented. Obese. Diaphoretic. Glasses. Ncat. Perrl. RRR  Faint wheezing b.l   Obese soft nt nd nabs  Left hand and left foot +swelling, erythema, warmth ttp   No focal deficit  See above.      Medications Reviewed      Labs: Results:       Chemistry Recent Labs      04/12/17   1447   GLU  400*   NA  135*   K  3.7   CL  95*   CO2  33*   BUN  21*   CREA  1.42*   CA  9.2   AGAP  7   BUCR  15   AP  47   TP  8.2   ALB  3.0*   GLOB  5.2* AGRAT  0.6*      CBC w/Diff Recent Labs      04/12/17   1447   WBC  7.2   RBC  4.86   HGB  13.7   HCT  40.6   PLT  159   GRANS  75*   LYMPH  16*   EOS  2      Cardiac Enzymes No results for input(s): CPK, CKND1, SANTOS in the last 72 hours. No lab exists for component: CKRMB, TROIP   Coagulation No results for input(s): PTP, INR, APTT in the last 72 hours. No lab exists for component: INREXT    Lipid Panel Lab Results   Component Value Date/Time    Cholesterol, total 134 11/06/2016 04:18 AM    HDL Cholesterol 38 11/06/2016 04:18 AM    LDL, calculated 37 11/06/2016 04:18 AM    VLDL, calculated 59 11/06/2016 04:18 AM    Triglyceride 295 11/06/2016 04:18 AM    CHOL/HDL Ratio 3.5 11/06/2016 04:18 AM      BNP No results for input(s): BNPP in the last 72 hours.    Liver Enzymes Recent Labs      04/12/17   1447   TP  8.2   ALB  3.0*   AP  47   SGOT  26      Thyroid Studies Lab Results   Component Value Date/Time    TSH 1.21 11/01/2016 08:59 AM        Procedures/imaging: see electronic medical records for all procedures/Xrays and details which were not copied into this note but were reviewed prior to creation of Plan

## 2017-04-13 NOTE — ED NOTES
Assumed care of pt, received bedside report from [address]. Pt alert and oriented, resting in bed, c/o 8/10 pain in left hand and right foot, pt has swelling, redness and warmth to top of left hand and top of right foot, states she injected cocaine there last week and they have been swollen and red since then. Pt states she also injects heroin. Pts VSS, will continue to monitor.  Pt is on home oxygen, currently on NC at 2 L

## 2017-04-14 ENCOUNTER — APPOINTMENT (OUTPATIENT)
Dept: MRI IMAGING | Age: 62
DRG: 364 | End: 2017-04-14
Attending: ORTHOPAEDIC SURGERY
Payer: MEDICAID

## 2017-04-14 LAB
GLUCOSE BLD STRIP.AUTO-MCNC: 156 MG/DL (ref 70–110)
GLUCOSE BLD STRIP.AUTO-MCNC: 179 MG/DL (ref 70–110)
GLUCOSE BLD STRIP.AUTO-MCNC: 349 MG/DL (ref 70–110)

## 2017-04-14 PROCEDURE — 74011250636 HC RX REV CODE- 250/636: Performed by: HOSPITALIST

## 2017-04-14 PROCEDURE — 82962 GLUCOSE BLOOD TEST: CPT

## 2017-04-14 PROCEDURE — 73721 MRI JNT OF LWR EXTRE W/O DYE: CPT

## 2017-04-14 PROCEDURE — 65270000029 HC RM PRIVATE

## 2017-04-14 PROCEDURE — 73218 MRI UPPER EXTREMITY W/O DYE: CPT

## 2017-04-14 PROCEDURE — 74011636637 HC RX REV CODE- 636/637: Performed by: HOSPITALIST

## 2017-04-14 PROCEDURE — 74011000258 HC RX REV CODE- 258: Performed by: HOSPITALIST

## 2017-04-14 PROCEDURE — 74011250637 HC RX REV CODE- 250/637: Performed by: HOSPITALIST

## 2017-04-14 PROCEDURE — 74011000250 HC RX REV CODE- 250: Performed by: HOSPITALIST

## 2017-04-14 RX ADMIN — Medication 1 MG: at 23:12

## 2017-04-14 RX ADMIN — MEROPENEM 1 G: 1 INJECTION, POWDER, FOR SOLUTION INTRAVENOUS at 03:15

## 2017-04-14 RX ADMIN — IPRATROPIUM BROMIDE AND ALBUTEROL SULFATE 3 ML: .5; 3 SOLUTION RESPIRATORY (INHALATION) at 01:33

## 2017-04-14 RX ADMIN — POTASSIUM CHLORIDE 20 MEQ: 1.5 POWDER, FOR SOLUTION ORAL at 11:50

## 2017-04-14 RX ADMIN — Medication 1 MG: at 19:10

## 2017-04-14 RX ADMIN — VANCOMYCIN HYDROCHLORIDE 1500 MG: 10 INJECTION, POWDER, LYOPHILIZED, FOR SOLUTION INTRAVENOUS at 01:41

## 2017-04-14 RX ADMIN — GABAPENTIN 300 MG: 300 CAPSULE ORAL at 16:00

## 2017-04-14 RX ADMIN — PREDNISONE 5 MG: 10 TABLET ORAL at 11:50

## 2017-04-14 RX ADMIN — HYDROCODONE BITARTRATE AND ACETAMINOPHEN 1 TABLET: 5; 325 TABLET ORAL at 22:57

## 2017-04-14 RX ADMIN — MEROPENEM 1 G: 1 INJECTION, POWDER, FOR SOLUTION INTRAVENOUS at 22:58

## 2017-04-14 RX ADMIN — ARIPIPRAZOLE 10 MG: 10 TABLET ORAL at 11:49

## 2017-04-14 RX ADMIN — INSULIN LISPRO 3 UNITS: 100 INJECTION, SOLUTION INTRAVENOUS; SUBCUTANEOUS at 16:27

## 2017-04-14 RX ADMIN — INSULIN GLARGINE: 100 INJECTION, SOLUTION SUBCUTANEOUS at 11:47

## 2017-04-14 RX ADMIN — SERTRALINE HYDROCHLORIDE 50 MG: 50 TABLET ORAL at 11:50

## 2017-04-14 RX ADMIN — VANCOMYCIN HYDROCHLORIDE 1500 MG: 10 INJECTION, POWDER, LYOPHILIZED, FOR SOLUTION INTRAVENOUS at 23:02

## 2017-04-14 RX ADMIN — TRAZODONE HYDROCHLORIDE 100 MG: 100 TABLET ORAL at 22:57

## 2017-04-14 RX ADMIN — GABAPENTIN 300 MG: 300 CAPSULE ORAL at 11:54

## 2017-04-14 RX ADMIN — INSULIN LISPRO 12 UNITS: 100 INJECTION, SOLUTION INTRAVENOUS; SUBCUTANEOUS at 23:00

## 2017-04-14 RX ADMIN — BUPRENORPHINE AND NALOXONE 1 TABLET: 2; .5 TABLET SUBLINGUAL at 11:50

## 2017-04-14 RX ADMIN — LEVOTHYROXINE SODIUM 75 MCG: 75 TABLET ORAL at 07:30

## 2017-04-14 RX ADMIN — MEROPENEM 1 G: 1 INJECTION, POWDER, FOR SOLUTION INTRAVENOUS at 12:06

## 2017-04-14 RX ADMIN — EPLERENONE 25 MG: 25 TABLET, FILM COATED ORAL at 11:51

## 2017-04-14 RX ADMIN — ROSUVASTATIN CALCIUM 5 MG: 5 TABLET ORAL at 22:56

## 2017-04-14 RX ADMIN — GABAPENTIN 300 MG: 300 CAPSULE ORAL at 22:56

## 2017-04-14 RX ADMIN — HYDROCODONE BITARTRATE AND ACETAMINOPHEN 1 TABLET: 5; 325 TABLET ORAL at 08:28

## 2017-04-14 RX ADMIN — ASPIRIN 81 MG: 81 TABLET, COATED ORAL at 11:55

## 2017-04-14 RX ADMIN — INSULIN GLARGINE 40 UNITS: 100 INJECTION, SOLUTION SUBCUTANEOUS at 22:57

## 2017-04-14 RX ADMIN — Medication 1 TABLET: at 11:50

## 2017-04-14 RX ADMIN — DIVALPROEX SODIUM 250 MG: 250 TABLET, DELAYED RELEASE ORAL at 22:57

## 2017-04-14 RX ADMIN — Medication 1 MG: at 01:50

## 2017-04-14 RX ADMIN — GABAPENTIN 300 MG: 300 CAPSULE ORAL at 11:55

## 2017-04-14 RX ADMIN — IPRATROPIUM BROMIDE AND ALBUTEROL SULFATE 3 ML: .5; 3 SOLUTION RESPIRATORY (INHALATION) at 14:56

## 2017-04-14 RX ADMIN — ERGOCALCIFEROL 50000 UNITS: 1.25 CAPSULE ORAL at 11:51

## 2017-04-14 NOTE — DIABETES MGMT
NUTRITIONAL ASSESSMENT & GLYCEMIC CONTROL CARE PLAN     Abilio Sinha           62 y.o.           4/12/2017                 Patient Active Problem List   Diagnosis Code    Diabetes mellitus ([facility] E11.9    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 G62.9    Venous insufficiency I87.2    Morbid obesity ([facility] E66.01    Chronic back pain M54.9, G89.29    Oxygen dependent Z99.81    Hypertension I10    Pain in joint, multiple sites M25.50    Encounter for long-term (current) use of other medications Z79.899    Major depressive disorder, recurrent (HCC) F33.9    Bipolar affective disorder ([facility] F31.9    Other and unspecified hyperlipidemia E78.5    Chronic diastolic heart failure (HCC) I50.32    Chronic airway obstruction, not elsewhere classified J44.9    Essential hypertension, benign I10    Chronic kidney disease N18.9    Depression F32.9    Back injury S39. 92XA    Bilateral knee pain M25.561, M25.562    Anxiety F41.9    Wears glasses Z97.3    History of hepatitis C Z86.19    Sickle cell trait (HCC) D57.3    Acute renal failure (HCC) N17.9    Unspecified hypothyroidism E03.9    Genital herpes A60.00    Sarcoidosis (HCC) G22.8    Metabolic encephalopathy M16.46    Hyponatremia E87.1    Abscess of right arm L02.413    Abdominal pain, epigastric R10.13    Chest pain R07.9    Hypoxemia requiring supplemental oxygen R09.02, Z99.81    Hyperglycemia R73.9    Elevated troponin R74.8    CAP (community acquired pneumonia) J18.9    'Light-for-dates' infant with signs of fetal malnutrition P05.00    Pneumonia J18.9    Abnormal nuclear stress test R94.39    Cellulitis L03.90    Cellulitis and abscess of hand L03.119, L02.519    Cellulitis and abscess of foot L03.119, L02.619      INTERVENTIONS/PLAN:     Diabetic education. Pt NPO for procedure today. Once diet is advanced monitor for need to increase basal insulin based on BG trends.      Recommend advance pt to very insulin resistant scale lispro per protocol. Recommend once diet is advanced to place pt on consistent carbohydrate 2000 kcal diet. Will continue to monitor inpatient for intervention. ASSESSMENT:   Nutritional Status:     Pt is overweight related to excess caloric intake as evidenced by 249% ideal weight and BMI= 56kg/m2. Pt meets criteria for morbid obesity. Diabetes Management:   Pt is 62 yr old female admitted on 4/13/17 with pain and swelling in he left hand and foot (pt uses heroin and cocaine and injects it into her left hand/foot). Pt with past medical history significant for T2DM, peripheral neuropathy, sarcoidosis, sickle cell, emphysema, hypothyroidism, asthma, anxiety, Body mass index is 56.58 kg/(m^2). - morbid obesity. Recent blood glucose:   Results for Parvin Rai (MRN [id number]) as of 4/14/2017 14:11   Ref.  Range 4/13/2017 11:28 4/13/2017 16:40 4/13/2017 21:09 4/14/2017 11:44   GLUCOSE,FAST - POC Latest Ref Range: 70 - 110 mg/dL 348 (H) 307 (H) 374 (H) 156 (H)     Within target range (non-ICU: <140; ICU<180): [] Yes   [x]  No    Current Insulin regimen: 40 units lantus two times daily, correctional lispro ACHS- normal insulin sensitivity scale    Home medication/insulin regimen: pt reports taking 15-20 units of regular U-500 with breakfast and lunch and 22 units with dinner daily    HbA1c: 10.8% equivalent  to ave Blood Glucose of 263mg/dl for 2-3 months prior to admission    Adequate glycemic control PTA:  [] Yes  [x] No       SUBJECTIVE/OBJECTIVE:   Information obtained from: Chart review, pt    Diet: NPO    Medications: [x]                Reviewed     Most Recent POC Glucose:   Recent Labs      04/12/17   1447   GLU  400*         Labs:   Lab Results   Component Value Date/Time    Hemoglobin A1c 10.8 04/12/2017 09:00 PM     Lab Results   Component Value Date/Time    Sodium 135 04/12/2017 02:47 PM    Potassium 3.7 04/12/2017 02:47 PM    Chloride 95 04/12/2017 02:47 PM    CO2 33 04/12/2017 02:47 PM    Anion gap 7 04/12/2017 02:47 PM    Glucose 400 04/12/2017 02:47 PM    BUN 21 04/12/2017 02:47 PM    Creatinine 1.42 04/12/2017 02:47 PM    Calcium 9.2 04/12/2017 02:47 PM    Magnesium 1.8 06/01/2013 06:00 PM    Phosphorus 3.2 11/12/2009 05:30 AM    Albumin 3.0 04/12/2017 02:47 PM       Anthropometrics: IBW : 62 kg (136 lb 11 oz), % IBW (Calculated): 248.75 %, BMI (calculated): 56.6  Wt Readings from Last 1 Encounters:   04/12/17 154.2 kg (340 lb)      Ht Readings from Last 1 Encounters:   04/12/17 5' 5\" (1.651 m)       Estimated Nutrition Needs:   [x] 25-35 Kcal/kg 8393-0254 kcal/day Protein 0.8-1g/kg  g/day  Based on:   []          Actual BW    []          SBW   [x]            Adjusted BW   (81 kg)    Nutrition Diagnoses:       Altered nutrition related lab values related to HbA1c as evidenced by HbA1c of 10.8%    Nutrition Interventions: diabetic education, diet recommendations  Goal: Patient/family will demonstrate understanding of Diabetes Self Management Skills by: (date) _4/21/17  Intervention :Food/Nutrient Delivery: Yes  Intervention: Nutrition Education: Yes  Intervention: Nutrition Counseling: Yes     Nutrition Monitoring and Evaluation      []     Monitor po intake   [x]     Continue inpatient monitoring and intervention  [x]     BG Within target range (non-ICU: <140; ICU<180)  By 4/17/17    Edie Rojo MS, [address] [place] [address]  Pager: [phone]

## 2017-04-14 NOTE — DIABETES MGMT
Diabetes Patient/Family Education Record    Factors That  May Influence Patients Ability  to Learn or  Comply With  Recommendations:    []   Language barrier    []   Cultural needs   [x]   Motivation    []   Cognitive limitation    []   Physical   [x]   Education    []   Physiological factors   []   Hearing/vision/speaking impairment   []   Restoration beliefs    []   Financial factors   []  Other:   []  No factors identified at this time.      Person Instructed:   [x]   Patient   []   Family   []  Other     Preference for Learning:   [x]   Verbal   [x]   Written   []  Demonstration     Level of Comprehension & Competence:    []  Good                                      [x] Fair                                     []  Poor                             [x]  Needs Reinforcement   [x]  Teachback completed    Education Component:   [x]  Medication management, pt reports taking 15-20 units U-500 with breakfast and lunch, 22 units with dinner   [x]  Nutritional management including the role of carbohydrate intake pt reports she needs to incorporate more vegetables into her diet and less carbohydrates   []  Exercise   []  Signs, symptoms, and treatment of hyperglycemia and hypoglycemia   [] Treatment of hyperglycemia and hypoglycemia   []  Importance of blood glucose monitoring and how to obtain a blood glucose meter    []  Instruction on use of blood glucose meter   [x]  Discuss the importance of HbA1C monitoring and provide patient with  results   []  Sick day guidelines   []  Proper use and disposal of lancets, needles, syringes or insulin pens (if appropriate)   [x]  Potential long-term complications (retinopathy, kidney disease, neuropathy, heart disease, stroke, vascular disease, foot care)   [x] Provide emergency contact number and contact number for more information    [x]  Goal:  Patient/family will demonstrate understanding of Diabetes Self Management Skills by: (date) _4/21/17______     Pamela Vuong MS, RD, CDE  Pager: [phone]

## 2017-04-14 NOTE — PROGRESS NOTES
conducted an initial consultation and Spiritual Assessment for Stephen Sinha, who is a 62 y.o.,female. Patients Primary Language is: [place]. According to the patients EMR Orthodoxy Affiliation is: [place].      The reason the Patient came to the hospital is:   Patient Active Problem List    Diagnosis Date Noted    Cellulitis and abscess of hand 04/13/2017    Cellulitis and abscess of foot 04/13/2017    Cellulitis 04/12/2017    Abnormal nuclear stress test 11/17/2016    Hyperglycemia 10/31/2016    Elevated troponin 10/31/2016    CAP (community acquired pneumonia) 10/31/2016    'Light-for-dates' infant with signs of fetal malnutrition 10/31/2016    Pneumonia 10/31/2016    Hypoxemia requiring supplemental oxygen 12/29/2014    Abdominal pain, epigastric 07/26/2014    Chest pain 07/26/2014    Abscess of right arm 06/03/2013    Acute renal failure ([address] 05/31/2013    Unspecified hypothyroidism 05/31/2013    Genital herpes 05/31/2013    Sarcoidosis ([address] 36/08/8017    Metabolic encephalopathy 29/18/4848    Hyponatremia 05/31/2013    Chronic diastolic heart failure (HCC)     Chronic airway obstruction, not elsewhere classified     Essential hypertension, benign     Chronic kidney disease     Depression     Back injury     Bilateral knee pain     Anxiety     Wears glasses     History of hepatitis C     Sickle cell trait ([address]     Other and unspecified hyperlipidemia     Bipolar affective disorder ([address] 12/05/2012    Major depressive disorder, recurrent ([address] 11/30/2012    Pain in joint, multiple sites 08/16/2011    Encounter for long-term (current) use of other medications 08/16/2011    Diabetes mellitus ([address]     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     Venous insufficiency     Morbid obesity ([address]     Chronic back pain     Oxygen dependent     Hypertension         The  provided the following Interventions:  Initiated a relationship of care and support. Explored issues of agueda, belief, spirituality and Confucianism/ritual needs while hospitalized. Listened empathically. Provided information about Spiritual Care Services. Offered prayer and assurance of continued prayers on patient's behalf. Chart reviewed. The following outcomes where achieved:  Patient shared limited information about both their medical narrative and spiritual journey/beliefs.  confirmed Patient's Spiritism Affiliation. Patient processed feeling about current hospitalization. Patient expressed gratitude for 's visit. Assessment:  Patient does not have any Confucianism/cultural needs that will affect patients preferences in health care. There are no spiritual or Confucianism issues which require intervention at this time. Plan:  Chaplains will continue to follow and will provide pastoral care on an as needed/requested basis.  recommends bedside caregivers page  on duty if patient shows signs of acute spiritual or emotional distress.       [address] [facility]   [phone]

## 2017-04-14 NOTE — PROGRESS NOTES
Stephen RODRIGUEZ Roots with IVDA left hand dorsum and to left foot dorsum    1. MRI LEFT HAND/MRI LEFT FOOT: Ordered yesterday, nursing confirmed that patient will have MRIs this morning  2. NPO   3. Elevate Left hand  and left foot  4.  DC Plavix and Heparin - okay per Dr Lillie Núñez, PA-C  4/14/2017

## 2017-04-14 NOTE — DIABETES MGMT
Diabetes Patient/Family Education Record    Factors That  May Influence Patients Ability  to Learn or  Comply With  Recommendations:    []   Language barrier    []   Cultural needs   [x]   Motivation    []   Cognitive limitation    []   Physical   [x]   Education    []   Physiological factors   []   Hearing/vision/speaking impairment   []   Evangelical beliefs    []   Financial factors   []  Other:   []  No factors identified at this time. Person Instructed:   [x]   Patient   []   Family   []  Other     Preference for Learning:   [x]   Verbal   [x]   Written   []  Demonstration     Level of Comprehension & Competence:    [x]  Good                                      [] Fair                                     []  Poor                             []  Needs Reinforcement   [x]  Teachback completed    Education Component:   [x]  Medication management, including how to administer insulin (if appropriate) and potential medication interactions: Patient that she is followed at the diabetes clinic in [place] under the care of [employer] and takes the following diabetes medication at home:  Regular U-500 insulin:  Breakfast: 15-20 units depending on the size of food. Lunch: 15-20 units depending on the size of food. Dinner: 22 units   [x]  Nutritional management including the role of carbohydrate intake: Patient stated that she is consuming concentrated sweets: cookies, sherbet, regular soda instead of diet. Discussed the effect of on her blood sugar and patient stated that she's willing to make necessary change to help achieve diabetes control. She also accepted educational materials for diabetes nutrition.   Encouraged patient to attend diabetes classes and she accepted the class schedule.   []  Exercise   [x]  Signs, symptoms, and treatment of hyperglycemia and hypoglycemia   [x] Treatment of hyperglycemia and hypoglycemia   [x]  Importance of blood glucose monitoring and how to obtain a blood glucose meter: Patient stated that she has BG meter and testing supplies at home and checks BG at least 3x daily. []  Instruction on use of blood glucose meter   [x]  Discuss the importance of HbA1C monitoring and provide patient with results: 10.8% (04/12/2017) is equivalent to average blood glucose of 263 mg/dL during the past 2-3 months. Patient verbalized understanding that she has an elevated A1C. Discussed list of potential complications due to uncontrolled diabetes (see list below).   Encouraged patient to follow diabetes treatment plan.   []  Sick day guidelines   []  Proper use and disposal of lancets, needles, syringes or insulin pens (if appropriate)   [x]  Potential long-term complications (retinopathy, kidney disease, neuropathy, heart disease, stroke, vascular disease, foot care)   [x] Provide emergency contact number and contact number for more information    [x]  Goal:  Patient/family will demonstrate understanding of Diabetes Self Management Skills by: 14/21/2017  Plan for post-discharge education or self-management support:    [x] Outpatient class schedule provided            [] Patient Declined    [] Scheduled for outpatient classes (date) _______         Jeferson Chew RN

## 2017-04-14 NOTE — PROGRESS NOTES
THERESA Sinha seen and examined. Visit Vitals    /82 (BP 1 Location: Left arm, BP Patient Position: At rest)    Pulse 78    Temp 99.2 °F (37.3 °C)    Resp 18    Ht 5' 5\" (1.651 m)    Wt 340 lb (154.2 kg)    SpO2 94%    BMI 56.58 kg/m2       On examination 4/14/2017 , the patient is alert, oriented (name, place, time) and follows commands. she is in no acute distress and her affect and mood are appropriate. Left hand: unchanged: focal swelling to dorsal left hand. 2 areas of slight erythema dorsal hand    Swelling to all fingers, but DARI flexor/extensor sheaths. Left foot: dorsal eschar to left foot dorsum. No pus      Diffuse swelling left foot dorsum      DIAGNOSTIC IMAGING     EXAM: MRI of the left hand without contrast     INDICATION: IV drug abuse. Pain left hand. Swelling.     TECHNIQUE: MRI of the left hand performed. The following sequences obtained:  Axial T2 fat sat, axial T1, coronal T2 fat sat, coronal T1, coronal gradient  echo, sagittal T2 2 fat sat.      COMPARISON: Plain film dated 4/12/2017     FINDINGS:   The marrow signal of the left hand is unremarkable. No evidence of osteomyelitis  in the left hand or left wrist. Degenerative changes are noted in the first [facility]  joint. Diffuse dorsal subcutaneous edema is present. No fluid collection  appreciated. The visualized flexor and extensor tendons are unremarkable. The  carpal tunnel is unremarkable. The median nerve is unremarkable. Guyon's canal  appears grossly unremarkable within the visualized portion.     IMPRESSION  IMPRESSION:   1. Diffuse dorsal subcutaneous edema without evidence of fluid collection.     2. No evidence of osteomyelitis      MRI Results (most recent):    Results from Hospital Encounter encounter on 04/12/17   MRI ANKLE LT WO CONT   Narrative EXAM: MRI of the left ankle without contrast    INDICATION: Recent IV drug abuse. Left foot edema.     TECHNIQUE: MRI of the left ankle obtained without contrast. The following  sequences obtained: Axial T2 fat sat, axial T1, coronal T2 fat sat, coronal T1,  sagittal T2 fat sat and sagittal T1.    COMPARISON: X-ray of the left foot dated 4/12/2017    FINDINGS:   In the distal tibia, there is a 0.8 x 0.9 x 1.5 cm structure with heterogeneous  signal. This may represent a small focus of osteonecrosis or enchondroma. The  tibiotalar joint is intact and unremarkable. The distal tibia and fibula are  without evidence of acute fracture. The subtalar joint has minimal degenerative  changes in the posterior portion. The talonavicular joint is unremarkable. The  calcaneocuboid articulation is grossly unremarkable. The navicular cuneiform  articulation has a dorsal synovial cyst. There is otherwise grossly  unremarkable. The tarsometatarsal joints are grossly unremarkable. The  visualized portions the metatarsals are unremarkable. In the dorsal cuboid has  mild edema that measures 11 x 12 x 10 mm. The kidneys tendon is grossly unremarkable. The peroneal tendons are  unremarkable. The posterior tibialis, flexor digitorum longus and flexor  hallucis longus tendons are grossly unremarkable. The tibials anterior tendon  and extensor distal longus tendon is grossly unremarkable. The extensor hallucis  longus is unremarkable in the proximal portion. However, it is not  well-visualized at the naviculocuneiform articulation. There is extensive  subcutaneous edema in the dorsum of the foot at about the level of the navicular  cuneiform rotation. The extensor hallucis longus appears to be present distal to  the subcutaneous edema. The plantar fascia is unremarkable. Impression IMPRESSION:   1. No direct evidence of osteomyelitis. 2.  Edema in the cuboid. 3.  Extensive dorsal subcutaneous edema at the level of naviculocuneiform joint  and possible involvement of the extensor hallucis longus tendon.      4.  Osteonecrosis or enchondroma in the distal tibia.    5.  Dorsal synovial cyst from the naviculocuneiform joint. Impression  1. IVDA  Left hand and left foot: at this time. No abscess. Recommend warm compresses left foot and left hand. Will follow to monitor as an abscess can still develop.     Guy Lundberg MD  4/14/2017  4:45 PM

## 2017-04-14 NOTE — CONSULTS
[address]    Name:  Shaniqua Guardado  MR#:  [id number]  :  1955  Account #:  [de-identified]  Date of Adm:  2017  Date of Consultation:  2017      LOCATION:  Room [address] [place] [address].  1. Swelling and pain of the left hand dorsum after a history of  intravenous drug abuse, with self-injection of left hand. 2. Swelling and cellulitis of the dorsal part of the left foot, with a history  of self-injection and intravenous drug abuse on the dorsal part of the  left foot. CLINICAL HISTORY:  The patient is a 28-year-old female who I was  called on to consult for left hand pain and left foot pain. The ER  Physician called me at approximately midnight to see this patient. This  patient is a 28-year-old female who is an IV drug abuser. She last  used IV heroin and cocaine two days prior to admission, for which she  was admitted to [facility] on 2017. She presented to the  emergency room after complaining of one week of worsening swelling  and intractable pain to the dorsal part of her left hand and the dorsal  part of her left foot. While in the emergency room, she was started on  vancomycin as well as meropenem. She had a blood sugar level of  400. PAST MEDICAL HISTORY  Her past medical history is significant for:  1. Insulin-requiring diabetes mellitus. 2. Peripheral neuropathy. 3. Sarcoidosis. 4. Sickle cell trait. 5. Emphysema. 6. Hypothyroidism. 7. Bipolar disorder. 8. Chronic diastolic heart failure. 9. Stage 3 chronic kidney disease. ALLERGIES  SHE IS ALLERGIC TO:  1. MOXIFLOXACIN, WHICH CREATES HIVES. 2. SULFA, WHICH CREATES HIVES. FAMILY HISTORY:  Noncontributory. SOCIAL HISTORY:  The patient has smoked cigarettes for at least 30  years. She does not drink alcohol. She does use IV drugs. She uses  something called speedball.   She has used cocaine and heroin  injections, self-administered, in the past.  She lives alone.    REVIEW OF SYSTEMS:  She has denied any fevers, shakes, chills,  and night sweats. She has mentioned having worsening swelling and  pain to her left hand and worsening pain and swelling to the dorsal part  of her left foot. There is also a history of wheezing and chest  congestion. PHYSICAL EXAMINATION  GENERAL APPEARANCE:  On examination, the patient is examined  this evening at 7:28 p.m. on today, April 13, 2017. She is lying supine  at this time. DERMATOLOGIC:  Her left hand was examined. She has diffuse  swelling to her left hand and to all of her fingers. She can flex and  extend her fingers fairly well, although she does it slowly and she does  it incompletely. She is nontender to the thumb, index and ring fingers,  long finger, and baby finger, at the corresponding flexor and extensor  tendons. On the dorsal part of her left hand, she has two small areas  of some focal asymmetric swelling. On close inspection, there are  numerous tiny black skin puncture marks, where she admits to having  injected herself or tried to inject herself in the dorsal part of her hand. She states she \"missed the vein\". There are two areas, one at the  distal portion of the dorsal part of her hand between the index  metacarpal and long finger metacarpal, and then another area  between the index metacarpal and the ring finger metacarpal, again  dorsal aspects, just kind of proximal to the MCP joint regions. These  areas are slightly reddened in color. They are more full and swollen. When palpating these areas, I detect no fluctuance at this time. She is  nontender to the volar plantar part of her hand. She is nontender to  the carpal tunnel region of her hand. Her left foot was examined. She has an area of a dry eschar to the  dorsal part of her ankle. It is distal to her ankle joint and is not  involving the ankle joint.   It is near her talonavicular region on the  dorsal part of her foot adjacent to one of her extensor tendons. The  skin is dry. This area is not reddened or erythematous, but she has  diffuse swelling to the dorsal part of her left foot. She can still flex and  extend her ankle. She can invert and lonnie her left foot. Her pulses  are 2+ in the PT pulse. The DP pulse is 1+. She has good capillary  refill to her left foot. LABORATORY DATA:  Her white count yesterday was 7.2, and her  differential was 75% polymorphonucleocytes, with hemoglobin 13.7,  hematocrit 40.6, and platelet count 962. Her urinalysis was yellow and  clear, with a specific gravity of 1.021. The rest of the data points were  normal, other than having glucose that was greater than 1000. This is  abnormal, but the ketones were negative and blood negative. Urobilinogen was 1.0, nitrite negative, and leukocyte esterase  negative. Her PTT time was 86.3 (elevated). Temperature today at 1642 hours was 97.6. Blood pressure was  112/62. MEDICATIONS INCLUDE  1. Duoneb. 2. Abilify. 3. Aspirin 81 mg.  4. Suboxone. 5. Plavix 75 mg 1 per day. 6. Depakote 250 mg 1 p.o. each day. 7. Inspra 25 mg p.o. once a day. 8. Ergocalciferol 50,000 units once a week. 9. Folic acid. 10. Neurontin 300 mg. 11. Heparin 5000 subcutaneous every 8 hours. 12. Lantus insulin 40 units. 13. Humalog. 14. Synthroid 75 mcg. [place]. 16. Potassium chloride. 17. Prednisone 5 mg. 18. Zoloft 50 mg. 19. Trazodone 100 mg. 20. Vancomycin. IMPRESSION:  The patient is a 60-year-old female who is a self-  inflicted drug abuser. She does intravenous drug use with cocaine and  heroin. The patient presents with a one-week onset of worsening pain  and swelling to her left hand.   Clinically, she has two small areas on  the dorsal part of her hand that are asymmetric, and this is centered in  the distal one-third of the left hand between her index metacarpal and  long finger metacarpal, and in between her long finger metacarpal and  third ring finger metacarpal.  I suspect at this time she has some  induration in this area (mild), but it looks as if she has diffuse swelling  to her left hand, but no signs of flexor extensor tenosynovitis. RECOMMENDATIONS  At this point, I recommend:  1. An MRI of her left hand to assess the dorsal part of her hand to  make sure there are no other potential abscesses deep to these two  focal areas of her left hand. At this point, I do not suspect, and  clinically there is no evidence of, a flexor extensor tenosynovitis of her  hand. We are going to obtain an MRI of her left hand. 2. Eschar of the dorsal part of her left foot, with diffuse swelling of her  left foot. Without any significant erythema on the dorsal part of left  foot. This individual, who is immunosuppressed and who takes  prednisone, I am recommending an MRI of her left foot to assess the  proximal dorsal part of her left foot as well.  3. N.p.o. after midnight. 4. She is on heparin and blood thinners. The question is whether I can  stop this at least at this point because if she needs any surgery and  she is on heparin and Plavix, she will bleed, and blood a lot. We may  need to stop her blood thinners. Stop at least her Plavix and her  heparin.         Maryan Jackson MD    [address] / [address]  D:  04/13/2017   19:57  T:  04/13/2017   21:44  Job #:  [id number]

## 2017-04-14 NOTE — PROGRESS NOTES
Hospitalist Progress Note    Patient: Dileep Miller MRN: [id number]  CSN: [id number]    YOB: 1955  Age: 62 y.o. Sex: female    DOA: 4/12/2017 LOS:  LOS: 2 days          MRI left ankle no direct evidence of osteomyelitis. MRI no evidence of osteomyelitis. She states her w/drawals are a little better, she is hungry. Assessment/Plan     1. Cellulitis and abscess of left hand and left foot in the setting of ivdu. Dr. Eric Witt to consult. Blood cx (4/11) ngtd. Glucose control. 2. Ivdu with cocaine and heroin. Avoid beta blockade. Will inquire regarding HIV. 3. COPD - mild wheezing. Spiriva, duoneb now. Close monitoring. 4. DM2. lantus ssi. a1c 10.8. Diabetes educator worked with her. 5. Tobacco abuse. Smoking cessation medication. 6. Echo 11/1/16 EF 55%, no obvious WMA. Moderate concentric hypertrophy. Grade 1 DD  7. Morbid obesity Body mass index is 56.58 kg/(m^2). 8. Full code. I discussed the case with Dr. Eric Witt. Additional Notes:      Case discussed with:  [x]Patient  []Family  [x]Nursing  []Case Management  DVT Prophylaxis:  []Lovenox  []Hep SQ  []SCDs  []Coumadin   []On Heparin gtt    Vital signs/Intake and Output:  Visit Vitals    /74 (BP 1 Location: Left arm, BP Patient Position: At rest)    Pulse 81    Temp 98 °F (36.7 °C)    Resp 18    Ht 5' 5\" (1.651 m)    Wt 154.2 kg (340 lb)    SpO2 95%    BMI 56.58 kg/m2     Current Shift:     Last three shifts:  04/12 1901 - 04/14 0700  In: 1200 [P.O.:1200]  Out: 500 [Urine:500]    Awake alert and oriented. Obese. Glasses. Ncat. Perrl. RRR  Faint wheezing b.l   Obese soft nt nd nabs  Left hand and left foot +swelling, erythema, warmth ttp   No focal deficit  See above.      Medications Reviewed      Labs: Results:       Chemistry Recent Labs      04/12/17   1447   GLU  400*   NA  135*   K  3.7   CL  95*   CO2  33*   BUN  21*   CREA  1.42*   CA  9.2   AGAP  7   BUCR  15   AP  47   TP  8.2   ALB  3.0*   GLOB  5.2* AGRAT  0.6*      CBC w/Diff Recent Labs      04/12/17   1447   WBC  7.2   RBC  4.86   HGB  13.7   HCT  40.6   PLT  159   GRANS  75*   LYMPH  16*   EOS  2      Cardiac Enzymes No results for input(s): CPK, CKND1, SANTOS in the last 72 hours. No lab exists for component: CKRMB, TROIP   Coagulation No results for input(s): PTP, INR, APTT in the last 72 hours. No lab exists for component: INREXT, INREXT    Lipid Panel Lab Results   Component Value Date/Time    Cholesterol, total 134 11/06/2016 04:18 AM    HDL Cholesterol 38 11/06/2016 04:18 AM    LDL, calculated 37 11/06/2016 04:18 AM    VLDL, calculated 59 11/06/2016 04:18 AM    Triglyceride 295 11/06/2016 04:18 AM    CHOL/HDL Ratio 3.5 11/06/2016 04:18 AM      BNP No results for input(s): BNPP in the last 72 hours.    Liver Enzymes Recent Labs      04/12/17   1447   TP  8.2   ALB  3.0*   AP  47   SGOT  26      Thyroid Studies Lab Results   Component Value Date/Time    TSH 1.21 11/01/2016 08:59 AM        Procedures/imaging: see electronic medical records for all procedures/Xrays and details which were not copied into this note but were reviewed prior to creation of Plan

## 2017-04-15 ENCOUNTER — APPOINTMENT (OUTPATIENT)
Dept: GENERAL RADIOLOGY | Age: 62
DRG: 364 | End: 2017-04-15
Attending: PHYSICIAN ASSISTANT
Payer: MEDICAID

## 2017-04-15 LAB
ANION GAP BLD CALC-SCNC: 3 MMOL/L (ref 3–18)
BASOPHILS # BLD AUTO: 0 K/UL (ref 0–0.06)
BASOPHILS # BLD: 0 % (ref 0–2)
BUN SERPL-MCNC: 7 MG/DL (ref 7–18)
BUN/CREAT SERPL: 7 (ref 12–20)
CALCIUM SERPL-MCNC: 9.3 MG/DL (ref 8.5–10.1)
CHLORIDE SERPL-SCNC: 99 MMOL/L (ref 100–108)
CO2 SERPL-SCNC: 35 MMOL/L (ref 21–32)
CREAT SERPL-MCNC: 0.96 MG/DL (ref 0.6–1.3)
DIFFERENTIAL METHOD BLD: NORMAL
EOSINOPHIL # BLD: 0.2 K/UL (ref 0–0.4)
EOSINOPHIL NFR BLD: 3 % (ref 0–5)
ERYTHROCYTE [DISTWIDTH] IN BLOOD BY AUTOMATED COUNT: 13.1 % (ref 11.6–14.5)
GLUCOSE BLD STRIP.AUTO-MCNC: 202 MG/DL (ref 70–110)
GLUCOSE BLD STRIP.AUTO-MCNC: 232 MG/DL (ref 70–110)
GLUCOSE BLD STRIP.AUTO-MCNC: 290 MG/DL (ref 70–110)
GLUCOSE SERPL-MCNC: 157 MG/DL (ref 74–99)
HCT VFR BLD AUTO: 40.2 % (ref 35–45)
HGB BLD-MCNC: 12.9 G/DL (ref 12–16)
LYMPHOCYTES # BLD AUTO: 29 % (ref 21–52)
LYMPHOCYTES # BLD: 2.1 K/UL (ref 0.9–3.6)
MAGNESIUM SERPL-MCNC: 1.2 MG/DL (ref 1.6–2.6)
MCH RBC QN AUTO: 26.8 PG (ref 24–34)
MCHC RBC AUTO-ENTMCNC: 32.1 G/DL (ref 31–37)
MCV RBC AUTO: 83.4 FL (ref 74–97)
MONOCYTES # BLD: 0.5 K/UL (ref 0.05–1.2)
MONOCYTES NFR BLD AUTO: 7 % (ref 3–10)
NEUTS SEG # BLD: 4.3 K/UL (ref 1.8–8)
NEUTS SEG NFR BLD AUTO: 61 % (ref 40–73)
PLATELET # BLD AUTO: 147 K/UL (ref 135–420)
PMV BLD AUTO: 9.6 FL (ref 9.2–11.8)
POTASSIUM SERPL-SCNC: 3.9 MMOL/L (ref 3.5–5.5)
RBC # BLD AUTO: 4.82 M/UL (ref 4.2–5.3)
SODIUM SERPL-SCNC: 137 MMOL/L (ref 136–145)
WBC # BLD AUTO: 7.1 K/UL (ref 4.6–13.2)

## 2017-04-15 PROCEDURE — 80048 BASIC METABOLIC PNL TOTAL CA: CPT | Performed by: HOSPITALIST

## 2017-04-15 PROCEDURE — 74011250637 HC RX REV CODE- 250/637: Performed by: HOSPITALIST

## 2017-04-15 PROCEDURE — 83735 ASSAY OF MAGNESIUM: CPT | Performed by: HOSPITALIST

## 2017-04-15 PROCEDURE — 65270000029 HC RM PRIVATE

## 2017-04-15 PROCEDURE — 82962 GLUCOSE BLOOD TEST: CPT

## 2017-04-15 PROCEDURE — 73502 X-RAY EXAM HIP UNI 2-3 VIEWS: CPT

## 2017-04-15 PROCEDURE — 85025 COMPLETE CBC W/AUTO DIFF WBC: CPT | Performed by: HOSPITALIST

## 2017-04-15 PROCEDURE — 74011250636 HC RX REV CODE- 250/636: Performed by: HOSPITALIST

## 2017-04-15 PROCEDURE — 77010033678 HC OXYGEN DAILY

## 2017-04-15 PROCEDURE — 36415 COLL VENOUS BLD VENIPUNCTURE: CPT | Performed by: HOSPITALIST

## 2017-04-15 PROCEDURE — 94640 AIRWAY INHALATION TREATMENT: CPT

## 2017-04-15 PROCEDURE — 74011000258 HC RX REV CODE- 258: Performed by: HOSPITALIST

## 2017-04-15 PROCEDURE — 74011636637 HC RX REV CODE- 636/637: Performed by: HOSPITALIST

## 2017-04-15 RX ORDER — ACETAMINOPHEN 500 MG
500 TABLET ORAL
Status: DISCONTINUED | OUTPATIENT
Start: 2017-04-15 | End: 2017-04-20 | Stop reason: HOSPADM

## 2017-04-15 RX ORDER — INSULIN GLARGINE 100 [IU]/ML
45 INJECTION, SOLUTION SUBCUTANEOUS EVERY 12 HOURS
Status: DISCONTINUED | OUTPATIENT
Start: 2017-04-15 | End: 2017-04-20

## 2017-04-15 RX ORDER — AMOXICILLIN 250 MG
2 CAPSULE ORAL
Status: DISCONTINUED | OUTPATIENT
Start: 2017-04-15 | End: 2017-04-20 | Stop reason: HOSPADM

## 2017-04-15 RX ORDER — POLYETHYLENE GLYCOL 3350 17 G/17G
17 POWDER, FOR SOLUTION ORAL DAILY
Status: DISCONTINUED | OUTPATIENT
Start: 2017-04-16 | End: 2017-04-20 | Stop reason: HOSPADM

## 2017-04-15 RX ORDER — ENOXAPARIN SODIUM 100 MG/ML
40 INJECTION SUBCUTANEOUS EVERY 24 HOURS
Status: DISCONTINUED | OUTPATIENT
Start: 2017-04-15 | End: 2017-04-17

## 2017-04-15 RX ORDER — LANOLIN ALCOHOL/MO/W.PET/CERES
400 CREAM (GRAM) TOPICAL 2 TIMES DAILY
Status: DISCONTINUED | OUTPATIENT
Start: 2017-04-16 | End: 2017-04-17

## 2017-04-15 RX ORDER — MAGNESIUM SULFATE HEPTAHYDRATE 40 MG/ML
2 INJECTION, SOLUTION INTRAVENOUS ONCE
Status: DISPENSED | OUTPATIENT
Start: 2017-04-15 | End: 2017-04-16

## 2017-04-15 RX ADMIN — INSULIN LISPRO 6 UNITS: 100 INJECTION, SOLUTION INTRAVENOUS; SUBCUTANEOUS at 07:30

## 2017-04-15 RX ADMIN — ASPIRIN 81 MG: 81 TABLET, COATED ORAL at 11:16

## 2017-04-15 RX ADMIN — GABAPENTIN 300 MG: 300 CAPSULE ORAL at 16:00

## 2017-04-15 RX ADMIN — LEVOTHYROXINE SODIUM 75 MCG: 75 TABLET ORAL at 11:17

## 2017-04-15 RX ADMIN — INSULIN GLARGINE 45 UNITS: 100 INJECTION, SOLUTION SUBCUTANEOUS at 23:04

## 2017-04-15 RX ADMIN — ERGOCALCIFEROL 50000 UNITS: 1.25 CAPSULE ORAL at 11:16

## 2017-04-15 RX ADMIN — HYDROCODONE BITARTRATE AND ACETAMINOPHEN 1 TABLET: 5; 325 TABLET ORAL at 17:24

## 2017-04-15 RX ADMIN — INSULIN LISPRO 6 UNITS: 100 INJECTION, SOLUTION INTRAVENOUS; SUBCUTANEOUS at 16:30

## 2017-04-15 RX ADMIN — Medication 1 TABLET: at 11:16

## 2017-04-15 RX ADMIN — TRAZODONE HYDROCHLORIDE 100 MG: 100 TABLET ORAL at 23:00

## 2017-04-15 RX ADMIN — POTASSIUM CHLORIDE 20 MEQ: 1.5 POWDER, FOR SOLUTION ORAL at 11:17

## 2017-04-15 RX ADMIN — TIOTROPIUM BROMIDE 18 MCG: 18 CAPSULE ORAL; RESPIRATORY (INHALATION) at 08:54

## 2017-04-15 RX ADMIN — INSULIN GLARGINE 40 UNITS: 100 INJECTION, SOLUTION SUBCUTANEOUS at 09:00

## 2017-04-15 RX ADMIN — SODIUM CHLORIDE 75 ML/HR: 900 INJECTION, SOLUTION INTRAVENOUS at 11:33

## 2017-04-15 RX ADMIN — ROSUVASTATIN CALCIUM 5 MG: 5 TABLET ORAL at 23:00

## 2017-04-15 RX ADMIN — GABAPENTIN 300 MG: 300 CAPSULE ORAL at 22:59

## 2017-04-15 RX ADMIN — MEROPENEM 1 G: 1 INJECTION, POWDER, FOR SOLUTION INTRAVENOUS at 11:17

## 2017-04-15 RX ADMIN — HYDROCODONE BITARTRATE AND ACETAMINOPHEN 1 TABLET: 5; 325 TABLET ORAL at 11:53

## 2017-04-15 RX ADMIN — STANDARDIZED SENNA CONCENTRATE AND DOCUSATE SODIUM 2 TABLET: 8.6; 5 TABLET, FILM COATED ORAL at 23:00

## 2017-04-15 RX ADMIN — SERTRALINE HYDROCHLORIDE 50 MG: 50 TABLET ORAL at 11:15

## 2017-04-15 RX ADMIN — ARIPIPRAZOLE 10 MG: 10 TABLET ORAL at 11:16

## 2017-04-15 RX ADMIN — GABAPENTIN 300 MG: 300 CAPSULE ORAL at 11:16

## 2017-04-15 RX ADMIN — INSULIN LISPRO 9 UNITS: 100 INJECTION, SOLUTION INTRAVENOUS; SUBCUTANEOUS at 23:06

## 2017-04-15 RX ADMIN — MEROPENEM 1 G: 1 INJECTION, POWDER, FOR SOLUTION INTRAVENOUS at 23:15

## 2017-04-15 RX ADMIN — PREDNISONE 5 MG: 10 TABLET ORAL at 11:16

## 2017-04-15 RX ADMIN — EPLERENONE 25 MG: 25 TABLET, FILM COATED ORAL at 11:16

## 2017-04-15 RX ADMIN — DIVALPROEX SODIUM 250 MG: 250 TABLET, DELAYED RELEASE ORAL at 23:00

## 2017-04-15 RX ADMIN — BUPRENORPHINE AND NALOXONE 1 TABLET: 2; .5 TABLET SUBLINGUAL at 11:15

## 2017-04-15 NOTE — PROGRESS NOTES
THERESA Sinha seen and examined. AO x 3    C/o right hip pain, hx DJD lumbar spine, previously under care Dr. Ellen Doyle    Visit Vitals    /79 (BP 1 Location: Right arm)    Pulse 77    Temp 99.3 °F (37.4 °C)    Resp 18    Ht 5' 5\" (1.651 m)    Wt 216 lb 11.4 oz (98.3 kg)    SpO2 96%    BMI 36.06 kg/m2           Left hand: unchanged: focal swelling to dorsal left hand. 2 areas of slight erythema dorsal hand    Swelling to all fingers, but DARI flexor/extensor sheaths. (A) Flexion all digits lacking 3 cm palm to pulp flexion, (A) extension lacking 10 degrees all digits, cap refill < 2 sec. Left foot: dorsal eschar to left foot dorsum diameter 2.5cm, area dry, no erythema, minimal TTP. Right hip: (P) ROM IR/ER supine painful at 10 and 12 degrees respectfully, DNVI fully RLE. Femoral nerve and Quad function (+) but guarded RLE. No lesions nor masses Right Hip / posterior lateral gluteus. DIAGNOSTIC IMAGING     EXAM: MRI of the left hand without contrast     INDICATION: IV drug abuse. Pain left hand. Swelling.     TECHNIQUE: MRI of the left hand performed. The following sequences obtained:  Axial T2 fat sat, axial T1, coronal T2 fat sat, coronal T1, coronal gradient  echo, sagittal T2 2 fat sat.      COMPARISON: Plain film dated 4/12/2017     FINDINGS:   The marrow signal of the left hand is unremarkable. No evidence of osteomyelitis  in the left hand or left wrist. Degenerative changes are noted in the first [facility]  joint. Diffuse dorsal subcutaneous edema is present. No fluid collection  appreciated. The visualized flexor and extensor tendons are unremarkable. The  carpal tunnel is unremarkable. The median nerve is unremarkable. Guyon's canal  appears grossly unremarkable within the visualized portion.     IMPRESSION  IMPRESSION:   1. Diffuse dorsal subcutaneous edema without evidence of fluid collection.     2.  No evidence of osteomyelitis      MRI Results (most recent):    Results from [facility] encounter on 04/12/17   MRI ANKLE LT WO CONT   Narrative EXAM: MRI of the left ankle without contrast    INDICATION: Recent IV drug abuse. Left foot edema. TECHNIQUE: MRI of the left ankle obtained without contrast. The following  sequences obtained: Axial T2 fat sat, axial T1, coronal T2 fat sat, coronal T1,  sagittal T2 fat sat and sagittal T1.    COMPARISON: X-ray of the left foot dated 4/12/2017    FINDINGS:   In the distal tibia, there is a 0.8 x 0.9 x 1.5 cm structure with heterogeneous  signal. This may represent a small focus of osteonecrosis or enchondroma. The  tibiotalar joint is intact and unremarkable. The distal tibia and fibula are  without evidence of acute fracture. The subtalar joint has minimal degenerative  changes in the posterior portion. The talonavicular joint is unremarkable. The  calcaneocuboid articulation is grossly unremarkable. The navicular cuneiform  articulation has a dorsal synovial cyst. There is otherwise grossly  unremarkable. The tarsometatarsal joints are grossly unremarkable. The  visualized portions the metatarsals are unremarkable. In the dorsal cuboid has  mild edema that measures 11 x 12 x 10 mm. The kidneys tendon is grossly unremarkable. The peroneal tendons are  unremarkable. The posterior tibialis, flexor digitorum longus and flexor  hallucis longus tendons are grossly unremarkable. The tibials anterior tendon  and extensor distal longus tendon is grossly unremarkable. The extensor hallucis  longus is unremarkable in the proximal portion. However, it is not  well-visualized at the naviculocuneiform articulation. There is extensive  subcutaneous edema in the dorsum of the foot at about the level of the navicular  cuneiform rotation. The extensor hallucis longus appears to be present distal to  the subcutaneous edema. The plantar fascia is unremarkable. Impression IMPRESSION:   1.   No direct evidence of osteomyelitis. 2.  Edema in the cuboid. 3.  Extensive dorsal subcutaneous edema at the level of naviculocuneiform joint  and possible involvement of the extensor hallucis longus tendon. 4.  Osteonecrosis or enchondroma in the distal tibia. 5.  Dorsal synovial cyst from the naviculocuneiform joint. Impression  1.) IVDA  Left hand and left foot: at this time. No abscess. Recommend warm compresses left foot and left hand. Will follow to monitor as an abscess can still develop.     2.) Right Hip X ray    3.) PT/OT OOB to chair ROM stretching    James Ward PA-C  4/15/2017  4:45 PM

## 2017-04-15 NOTE — PROGRESS NOTES
Hospitalist Progress Note    Patient: Donald Brown MRN: [id number]  CSN: [id number]    YOB: 1955  Age: 62 y.o. Sex: female    DOA: 4/12/2017 LOS:  LOS: 3 days          C/o right hip pain. She is constipated, no BM since she's been there. Still having pain and swelling to left hand. xr right hip done, results pending. She is constipated, no BM since admission. Heroin w/drawals improving. Assessment/Plan     1. Cellulitis of left hand and left foot in the setting of ivdu. Blood cx (4/11) ngtd. Glucose control. Recommend warm compresses left foot and left hand. Will follow to monitor as an abscess can still develop. Appreciate ortho input. 2. Ivdu with cocaine and heroin. Avoid beta blockade. Will inquire regarding HIV. 3. COPD - mild wheezing. Spiriva, duoneb now. Close monitoring. 4. DM2. lantus ssi. a1c 10.8. Diabetes educator worked with her. 5. Tobacco abuse. Smoking cessation medication. 6. Echo 11/1/16 EF 55%, no obvious WMA. Moderate concentric hypertrophy. Grade 1 DD  7. Hypomagnesemia replete as needed. 8. Right hip pain, hx DJD lumbar spine, previously under care Dr. Yaneth Smith - follow XR and ortho recs  9. Constipation - bowel regimen. Mobilize. 10. Morbid obesity Body mass index is 36.06 kg/(m^2). 11. Full code. Out of bed. Additional Notes:      Case discussed with:  [x]Patient  []Family  [x]Nursing  []Case Management  DVT Prophylaxis:  [x]Lovenox  []Hep SQ  []SCDs  []Coumadin   []On Heparin gtt    Vital signs/Intake and Output:  Visit Vitals    /69 (BP 1 Location: Right arm, BP Patient Position: At rest)    Pulse 74    Temp 99.2 °F (37.3 °C)    Resp 20    Ht 5' 5\" (1.651 m)    Wt 98.3 kg (216 lb 11.4 oz)    SpO2 93%    BMI 36.06 kg/m2     Current Shift:     Last three shifts:  04/13 1901 - 04/15 0700  In: -   Out: 400 [Urine:400]    Awake alert and oriented. Obese. Glasses. Ncat. Perrl.   RRR  cta b.l  Obese soft nt nd nabs  Left hand and left foot +swelling, erythema, warmth ttp   No focal deficit  See above. Medications Reviewed      Labs: Results:       Chemistry Recent Labs      04/15/17   0402  04/12/17   1447   GLU  157*  400*   NA  137  135*   K  3.9  3.7   CL  99*  95*   CO2  35*  33*   BUN  7  21*   CREA  0.96  1.42*   CA  9.3  9.2   AGAP  3  7   BUCR  7*  15   AP   --   47   TP   --   8.2   ALB   --   3.0*   GLOB   --   5.2*   AGRAT   --   0.6*      CBC w/Diff Recent Labs      04/15/17   0402  04/12/17   1447   WBC  7.1  7.2   RBC  4.82  4.86   HGB  12.9  13.7   HCT  40.2  40.6   PLT  147  159   GRANS  61  75*   LYMPH  29  16*   EOS  3  2      Cardiac Enzymes No results for input(s): CPK, CKND1, SANTOS in the last 72 hours. No lab exists for component: CKRMB, TROIP   Coagulation No results for input(s): PTP, INR, APTT in the last 72 hours. No lab exists for component: INREXT, INREXT    Lipid Panel Lab Results   Component Value Date/Time    Cholesterol, total 134 11/06/2016 04:18 AM    HDL Cholesterol 38 11/06/2016 04:18 AM    LDL, calculated 37 11/06/2016 04:18 AM    VLDL, calculated 59 11/06/2016 04:18 AM    Triglyceride 295 11/06/2016 04:18 AM    CHOL/HDL Ratio 3.5 11/06/2016 04:18 AM      BNP No results for input(s): BNPP in the last 72 hours.    Liver Enzymes Recent Labs      04/12/17   1447   TP  8.2   ALB  3.0*   AP  47   SGOT  26      Thyroid Studies Lab Results   Component Value Date/Time    TSH 1.21 11/01/2016 08:59 AM        Procedures/imaging: see electronic medical records for all procedures/Xrays and details which were not copied into this note but were reviewed prior to creation of Plan

## 2017-04-15 NOTE — ROUTINE PROCESS
Bedside and Verbal shift change report given to JENNY King (oncoming nurse) by Jonnie Peres RN (offgoing nurse). Report included the following information SBAR, Kardex, MAR and Recent Results. SITUATION:    Code Status: Full Code   Reason for Admission: Abnormal feces [R19.5]   Encounter for colonoscopy due to history of adenomatous colonic polyps [Z12.11, Z86.010]   Cigarette smoker [O74.828]    [facility] day: 3   Problem List:       Hospital Problems  Date Reviewed: 2/9/2017          Codes Class Noted POA    Cellulitis and abscess of hand ICD-10-CM: L03.119, L02.519  ICD-9-CM: 682.4  4/13/2017 Unknown        Cellulitis and abscess of foot ICD-10-CM: L03.119, L02.619  ICD-9-CM: 682.7  4/13/2017 Unknown        Cellulitis ICD-10-CM: L03.90  ICD-9-CM: 682.9  4/12/2017 Unknown              BACKGROUND:    Past Medical History:   Past Medical History:   Diagnosis Date    Anxiety     Asthma     Back injury 1986    jumped out of second story window    Behavioral change     Bilateral knee pain     Bipolar disorder [facility] [address]     Chronic airway obstruction, not elsewhere classified     SOB, on paula O2 Recent admission with psychosis    Chronic back pain     Chronic diastolic heart failure (HCC)     Stable on diuretics    Chronic kidney disease     stage III    Congestive heart failure (HCC)     COPD (chronic obstructive pulmonary disease) ([facility] [address] 10/31/2012    Cramps, muscle, general     Depression     Diabetes mellitus     Difficulty speaking     Difficulty walking     Difficulty writing     DJD (degenerative joint disease) of knee     bilateral, mild    Edema     The edema involves both lower extremities. The episodes last for 1 week. The patient describes this as worsening. Symptoms are exacerbated by prolonged sitting. Symptoms are relieved by leg elevation and diuretics. NO CHANGE    Essential hypertension, benign     Better controlled    Falls frequently     Fatigue     Generalized stiffness  Headache     Heartburn     Hiatal hernia     History of hepatitis C     treated    Hoarseness of voice     Hypothyroidism     Lung disease     Memory difficulty     Morbid obesity (HCC)     Multiple joint pain     Nausea     Numbness and tingling     arms and legs, related to diabetic neuropathy    Osteoarthritis of left knee     Other and unspecified hyperlipidemia     LDL reportedly very high/started on crestor recently by diabetic doctor    Oxygen dependent     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     [facility]arcoidosis ([facility] [address]     Sickle cell trait ([facility] [address]     Sinusitis     SOB (shortness of breath)     STD (female)     herpes    Swelling of body region     legs and feet    Tinnitus     Venous insufficiency     Vertigo     Weakness generalized     Wears glasses     Weight gain          Patient taking anticoagulants no     ASSESSMENT:    Changes in Assessment Throughout Shift: none     Patient has Central Line: no Reasons if yes: n/a   Patient has Renae Cath: no Reasons if yes: n/a      Last Vitals:     Vitals:    04/14/17 2000 04/14/17 2340 04/15/17 0000 04/15/17 0400   BP: 115/76  124/81 123/79   Pulse: 81  78 77   Resp: 18  18 18   Temp: 98.5 °F (36.9 °C)  98.9 °F (37.2 °C) 99.3 °F (37.4 °C)   SpO2: 97%  97% 96%   Weight:  98.3 kg (216 lb 11.4 oz)     Height:       LMP: 11/25/2012        IV and DRAINS (will only show if present)   Peripheral IV 04/12/17 Left Forearm-Site Assessment: Clean, dry, & intact     WOUND (if present)   Wound Type:  none   Dressing present Dressing Present : No   Wound Concerns/Notes:  none     PAIN    Pain Assessment    Pain Intensity 1: 3 (04/15/17 0100)    Pain Location 1: Foot, Hand    Pain Intervention(s) 1: Medication (see MAR)    Patient Stated Pain Goal: 0  o Interventions for Pain:  Chemical  o Intervention effective: yes  o Time of last intervention: 2300   o Reassessment Completed: yes      Last 3 Weights:  Last 3 Recorded Weights in this Encounter    04/12/17 1413 04/14/17 2340   Weight: 154.2 kg (340 lb) 98.3 kg (216 lb 11.4 oz)     Weight change:      INTAKE/OUPUT    Current Shift:      Last three shifts: 04/13 1901 - 04/15 0700  In: -   Out: 400 [Urine:400]     LAB RESULTS     Recent Labs      04/15/17   0402  04/12/17   1447   WBC  7.1  7.2   HGB  12.9  13.7   HCT  40.2  40.6   PLT  147  159        Recent Labs      04/15/17   0402  04/12/17   1447   NA  137  135*   K  3.9  3.7   GLU  157*  400*   BUN  7  21*   CREA  0.96  1.42*   CA  9.3  9.2   MG  1.2*   --        RECOMMENDATIONS AND DISCHARGE PLANNING     1. Pending tests/procedures/ Plan of Care or Other Needs: none     2. Discharge plan for patient and Needs/Barriers: TBD    3. Estimated Discharge Date: 4/18/17 Posted on Whiteboard in [facility]: yes      4. The patient's care plan was reviewed with the oncoming nurse. \"HEALS\" SAFETY CHECK      Fall Risk    Total Score: 3    Safety Measures: Safety Measures: Bed/Chair alarm on, Bed/Chair-Wheels locked, Bed in low position, Call light within reach, Side rails X 3    A safety check occurred in the patient's room between off going nurse and oncoming nurse listed above. The safety check included the below items  Area Items   H  High Alert Medications - Verify all high alert medication drips (heparin, PCA, etc.)   E  Equipment - Suction is set up for ALL patients (with yanker)  - Red plugs utilized for all equipment (IV pumps, etc.)  - WOWs wiped down at end of shift.  - Room stocked with oxygen, suction, and other unit-specific supplies   A  Alarms - Bed alarm is set for fall risk patients  - Ensure chair alarm is in place and activated if patient is up in a chair   L  Lines - Check IV for any infiltration  - Renae bag is empty if patient has a Renae   - Tubing and IV bags are labeled   S  Safety   - Room is clean, patient is clean, and equipment is clean. - Hallways are clear from equipment besides carts.    - Fall bracelet on for fall risk patients  - Ensure room is clear and free of clutter  - Suction is set up for ALL patients (with lashay)  - Hallways are clear from equipment besides carts.    - Isolation precautions followed, supplies available outside room, sign posted     Autry Cheadle, RN

## 2017-04-15 NOTE — ROUTINE PROCESS
Assumed pt care at 9:00 pm when RN shift began; Agency LPN was caring for pt prior RN shift; some pt care was not performed prior to RN shift.

## 2017-04-16 LAB
ANION GAP BLD CALC-SCNC: 8 MMOL/L (ref 3–18)
BUN SERPL-MCNC: 8 MG/DL (ref 7–18)
BUN/CREAT SERPL: 9 (ref 12–20)
CALCIUM SERPL-MCNC: 8.9 MG/DL (ref 8.5–10.1)
CHLORIDE SERPL-SCNC: 101 MMOL/L (ref 100–108)
CO2 SERPL-SCNC: 30 MMOL/L (ref 21–32)
CREAT SERPL-MCNC: 0.86 MG/DL (ref 0.6–1.3)
GLUCOSE BLD STRIP.AUTO-MCNC: 169 MG/DL (ref 70–110)
GLUCOSE BLD STRIP.AUTO-MCNC: 209 MG/DL (ref 70–110)
GLUCOSE BLD STRIP.AUTO-MCNC: 262 MG/DL (ref 70–110)
GLUCOSE BLD STRIP.AUTO-MCNC: 288 MG/DL (ref 70–110)
GLUCOSE SERPL-MCNC: 196 MG/DL (ref 74–99)
POTASSIUM SERPL-SCNC: 4 MMOL/L (ref 3.5–5.5)
SODIUM SERPL-SCNC: 139 MMOL/L (ref 136–145)

## 2017-04-16 PROCEDURE — 74011636637 HC RX REV CODE- 636/637: Performed by: HOSPITALIST

## 2017-04-16 PROCEDURE — 82962 GLUCOSE BLOOD TEST: CPT

## 2017-04-16 PROCEDURE — 74011250636 HC RX REV CODE- 250/636: Performed by: HOSPITALIST

## 2017-04-16 PROCEDURE — 77010033678 HC OXYGEN DAILY

## 2017-04-16 PROCEDURE — 36415 COLL VENOUS BLD VENIPUNCTURE: CPT | Performed by: HOSPITALIST

## 2017-04-16 PROCEDURE — 74011250637 HC RX REV CODE- 250/637: Performed by: HOSPITALIST

## 2017-04-16 PROCEDURE — 74011000258 HC RX REV CODE- 258: Performed by: HOSPITALIST

## 2017-04-16 PROCEDURE — 65270000029 HC RM PRIVATE

## 2017-04-16 PROCEDURE — 80048 BASIC METABOLIC PNL TOTAL CA: CPT | Performed by: HOSPITALIST

## 2017-04-16 RX ORDER — FLUCONAZOLE 200 MG/1
200 TABLET ORAL DAILY
Status: DISCONTINUED | OUTPATIENT
Start: 2017-04-16 | End: 2017-04-18

## 2017-04-16 RX ADMIN — INSULIN LISPRO 6 UNITS: 100 INJECTION, SOLUTION INTRAVENOUS; SUBCUTANEOUS at 11:30

## 2017-04-16 RX ADMIN — POLYETHYLENE GLYCOL 3350 17 G: 17 POWDER, FOR SOLUTION ORAL at 11:34

## 2017-04-16 RX ADMIN — STANDARDIZED SENNA CONCENTRATE AND DOCUSATE SODIUM 2 TABLET: 8.6; 5 TABLET, FILM COATED ORAL at 22:06

## 2017-04-16 RX ADMIN — PREDNISONE 5 MG: 10 TABLET ORAL at 11:33

## 2017-04-16 RX ADMIN — Medication 1 MG: at 18:48

## 2017-04-16 RX ADMIN — DIVALPROEX SODIUM 250 MG: 250 TABLET, DELAYED RELEASE ORAL at 22:06

## 2017-04-16 RX ADMIN — SODIUM CHLORIDE 75 ML/HR: 900 INJECTION, SOLUTION INTRAVENOUS at 13:49

## 2017-04-16 RX ADMIN — INSULIN LISPRO 9 UNITS: 100 INJECTION, SOLUTION INTRAVENOUS; SUBCUTANEOUS at 22:07

## 2017-04-16 RX ADMIN — GABAPENTIN 300 MG: 300 CAPSULE ORAL at 22:06

## 2017-04-16 RX ADMIN — ASPIRIN 81 MG: 81 TABLET, COATED ORAL at 11:34

## 2017-04-16 RX ADMIN — LEVOTHYROXINE SODIUM 75 MCG: 75 TABLET ORAL at 06:47

## 2017-04-16 RX ADMIN — TRAZODONE HYDROCHLORIDE 100 MG: 100 TABLET ORAL at 22:06

## 2017-04-16 RX ADMIN — INSULIN LISPRO 9 UNITS: 100 INJECTION, SOLUTION INTRAVENOUS; SUBCUTANEOUS at 16:30

## 2017-04-16 RX ADMIN — Medication 1 MG: at 23:01

## 2017-04-16 RX ADMIN — MEROPENEM 1 G: 1 INJECTION, POWDER, FOR SOLUTION INTRAVENOUS at 06:59

## 2017-04-16 RX ADMIN — ENOXAPARIN SODIUM 40 MG: 40 INJECTION, SOLUTION INTRAVENOUS; SUBCUTANEOUS at 13:00

## 2017-04-16 RX ADMIN — ERGOCALCIFEROL 50000 UNITS: 1.25 CAPSULE ORAL at 11:33

## 2017-04-16 RX ADMIN — EPLERENONE 25 MG: 25 TABLET, FILM COATED ORAL at 11:33

## 2017-04-16 RX ADMIN — METOLAZONE 2.5 MG: 2.5 TABLET ORAL at 17:00

## 2017-04-16 RX ADMIN — Medication 400 MG: at 18:00

## 2017-04-16 RX ADMIN — ARIPIPRAZOLE 10 MG: 10 TABLET ORAL at 11:33

## 2017-04-16 RX ADMIN — Medication 1 MG: at 02:13

## 2017-04-16 RX ADMIN — MEROPENEM 1 G: 1 INJECTION, POWDER, FOR SOLUTION INTRAVENOUS at 18:19

## 2017-04-16 RX ADMIN — Medication 1 TABLET: at 11:34

## 2017-04-16 RX ADMIN — INSULIN LISPRO 3 UNITS: 100 INJECTION, SOLUTION INTRAVENOUS; SUBCUTANEOUS at 07:30

## 2017-04-16 RX ADMIN — SERTRALINE HYDROCHLORIDE 50 MG: 50 TABLET ORAL at 11:34

## 2017-04-16 RX ADMIN — INSULIN GLARGINE 45 UNITS: 100 INJECTION, SOLUTION SUBCUTANEOUS at 09:00

## 2017-04-16 RX ADMIN — ROSUVASTATIN CALCIUM 5 MG: 5 TABLET ORAL at 22:06

## 2017-04-16 RX ADMIN — GABAPENTIN 300 MG: 300 CAPSULE ORAL at 11:34

## 2017-04-16 RX ADMIN — Medication 400 MG: at 11:34

## 2017-04-16 RX ADMIN — VANCOMYCIN HYDROCHLORIDE 1750 MG: 10 INJECTION, POWDER, LYOPHILIZED, FOR SOLUTION INTRAVENOUS at 01:38

## 2017-04-16 RX ADMIN — POTASSIUM CHLORIDE 20 MEQ: 1.5 POWDER, FOR SOLUTION ORAL at 11:34

## 2017-04-16 RX ADMIN — TIOTROPIUM BROMIDE 18 MCG: 18 CAPSULE ORAL; RESPIRATORY (INHALATION) at 09:00

## 2017-04-16 RX ADMIN — VANCOMYCIN HYDROCHLORIDE 1750 MG: 10 INJECTION, POWDER, LYOPHILIZED, FOR SOLUTION INTRAVENOUS at 19:00

## 2017-04-16 RX ADMIN — INSULIN GLARGINE 45 UNITS: 100 INJECTION, SOLUTION SUBCUTANEOUS at 22:06

## 2017-04-16 RX ADMIN — GABAPENTIN 300 MG: 300 CAPSULE ORAL at 16:00

## 2017-04-16 RX ADMIN — Medication 1 MG: at 11:04

## 2017-04-16 RX ADMIN — FLUCONAZOLE 200 MG: 200 TABLET ORAL at 13:00

## 2017-04-16 NOTE — PROGRESS NOTES
Hospitalist Progress Note    Patient: Naseem Davalos MRN: [id number]  CSN: [id number]    YOB: 1955  Age: 62 y.o. Sex: female    DOA: 4/12/2017 LOS:  LOS: 4 days          She reports vaginal itching. Foot improving gradually, left hand still swollen. She is on home oxygen 2L 24º daily, under the care of Dr. Zara Solorio for COPD and sarcoidosis per patient. She had BM. Assessment/Plan     1. Cellulitis of left hand and left foot in the setting of ivdu. Blood cx (4/11) ngtd. Glucose control. warm compresses left foot and left hand. monitor for development of abscess. Appreciate ortho input. 2. Ivdu with cocaine and heroin. Avoid beta blockade. 3. COPD - mild wheezing. Spiriva, duoneb now. Close monitoring. 4. DM2. lantus ssi. a1c 10.8. Diabetes educator worked with her. 5. Tobacco abuse. Smoking cessation medication. 6. Echo 11/1/16 EF 55%, no obvious WMA. Moderate concentric hypertrophy. Grade 1 DD  7. Hypomagnesemia replete as needed. 8. Right hip pain, hx DJD lumbar spine, previously under care Dr. Corry Wolf - follow XR and ortho recs  9. Constipation resolved w/ bowel regimen. Mobilize. 10. Morbid obesity Body mass index is 36.11 kg/(m^2). 11. Full code. Out of bed. Additional Notes:      Case discussed with:  [x]Patient  []Family  [x]Nursing  []Case Management  DVT Prophylaxis:  [x]Lovenox  []Hep SQ  []SCDs  []Coumadin   []On Heparin gtt    Vital signs/Intake and Output:  Visit Vitals    /78 (BP 1 Location: Right arm, BP Patient Position: At rest)    Pulse 81    Temp 98 °F (36.7 °C)    Resp 18    Ht 5' 5\" (1.651 m)    Wt 98.4 kg (217 lb)    SpO2 92%    BMI 36.11 kg/m2     Current Shift:     Last three shifts:  04/14 1901 - 04/16 0700  In: 550 [I.V.:550]  Out: 400 [Urine:400]    Awake alert and oriented. Obese. Glasses. Ncat. Perrl. RRR  cta b.l  Obese soft nt nd nabs  Left hand and left foot +swelling, erythema, warmth ttp   No focal deficit  See above. Medications Reviewed      Labs: Results:       Chemistry Recent Labs      04/16/17   0247  04/15/17   0402   GLU  196*  157*   NA  139  137   K  4.0  3.9   CL  101  99*   CO2  30  35*   BUN  8  7   CREA  0.86  0.96   CA  8.9  9.3   AGAP  8  3   BUCR  9*  7*      CBC w/Diff Recent Labs      04/15/17   0402   WBC  7.1   RBC  4.82   HGB  12.9   HCT  40.2   PLT  147   GRANS  61   LYMPH  29   EOS  3      Cardiac Enzymes No results for input(s): CPK, CKND1, SANTOS in the last 72 hours. No lab exists for component: CKRMB, TROIP   Coagulation No results for input(s): PTP, INR, APTT in the last 72 hours. No lab exists for component: INREXT, INREXT    Lipid Panel Lab Results   Component Value Date/Time    Cholesterol, total 134 11/06/2016 04:18 AM    HDL Cholesterol 38 11/06/2016 04:18 AM    LDL, calculated 37 11/06/2016 04:18 AM    VLDL, calculated 59 11/06/2016 04:18 AM    Triglyceride 295 11/06/2016 04:18 AM    CHOL/HDL Ratio 3.5 11/06/2016 04:18 AM      BNP No results for input(s): BNPP in the last 72 hours. Liver Enzymes No results for input(s): TP, ALB, TBIL, AP, SGOT, GPT in the last 72 hours.     No lab exists for component: DBIL   Thyroid Studies Lab Results   Component Value Date/Time    TSH 1.21 11/01/2016 08:59 AM        Procedures/imaging: see electronic medical records for all procedures/Xrays and details which were not copied into this note but were reviewed prior to creation of Plan

## 2017-04-16 NOTE — ROUTINE PROCESS
Bedside and Verbal shift change report given to JENNY King (oncoming nurse) by Alfredo Manriquez RN (offgoing nurse). Report included the following information SBAR, Kardex, MAR and Recent Results. SITUATION:    Code Status: Full Code  Reason for Admission: Abnormal feces [R19.5]  Encounter for colonoscopy due to history of adenomatous colonic polyps [Z12.11, Z86.010]   Cigarette smoker [N05.049]    [facility] day: 4   Problem List:       Hospital Problems  Date Reviewed: 2/9/2017          Codes Class Noted POA    Cellulitis and abscess of hand ICD-10-CM: L03.119, L02.519  ICD-9-CM: 682.4  4/13/2017 Unknown        Cellulitis and abscess of foot ICD-10-CM: L03.119, L02.619  ICD-9-CM: 682.7  4/13/2017 Unknown        Cellulitis ICD-10-CM: L03.90  ICD-9-CM: 682.9  4/12/2017 Unknown              BACKGROUND:    Past Medical History:   Past Medical History:   Diagnosis Date    Anxiety     Asthma     Back injury 1986    jumped out of second story window    Behavioral change     Bilateral knee pain     B[facility] disorder ([facility] [address]     Chronic airway obstruction, not elsewhere classified     SOB, on paula O2 Recent admission with psychosis    Chronic back pain     Chronic diastolic heart failure (HCC)     Stable on diuretics    Chronic kidney disease     stage III    Congestive heart failure ([facility] [address]     COPD (chronic obstructive pulmonary disease) ([facility] [address] 10/31/2012    Cramps, muscle, general     Depression     Diabetes mellitus     Difficulty speaking     Difficulty walking     Difficulty writing     DJD (degenerative joint disease) of knee     bilateral, mild    Edema     The edema involves both lower extremities. The episodes last for 1 week. The patient describes this as worsening. Symptoms are exacerbated by prolonged sitting. Symptoms are relieved by leg elevation and diuretics. NO CHANGE    Essential hypertension, benign     Better controlled    Falls frequently     Fatigue     Generalized stiffness  Headache     Heartburn     Hiatal hernia     History of hepatitis C     treated    Hoarseness of voice     Hypothyroidism     Lung disease     Memory difficulty     Morbid obesity (HCC)     Multiple joint pain     Nausea     Numbness and tingling     arms and legs, related to diabetic neuropathy    Osteoarthritis of left knee     Other and unspecified hyperlipidemia     LDL reportedly very high/started on crestor recently by diabetic doctor    Oxygen dependent     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     [facility]osis ([facility] [address]     Sickle cell trait ([facility] [address]     Sinusitis     SOB (shortness of breath)     STD (female)     herpes    Swelling of body region     legs and feet    Tinnitus     Venous insufficiency     Vertigo     Weakness generalized     Wears glasses     Weight gain          Patient taking anticoagulants no     ASSESSMENT:    Changes in Assessment Throughout Shift: none     Patient has Central Line: no Reasons if yes: n/a   Patient has Renae Cath: no Reasons if yes: n/a      Last Vitals:     Vitals:    04/15/17 2058 04/15/17 2328 04/16/17 0033 04/16/17 0500   BP: 101/59  109/69 117/65   Pulse: 78  75 73   Resp: 20  20 18   Temp: 98.4 °F (36.9 °C)  98.3 °F (36.8 °C) 98.4 °F (36.9 °C)   SpO2: 90% 96% 96% 92%   Weight:   98.4 kg (217 lb)    Height:       LMP: 11/25/2012        IV and DRAINS (will only show if present)   Peripheral IV 04/15/17 Right; Outer Forearm-Site Assessment: Clean, dry, & intact  [REMOVED] Peripheral IV 04/12/17 Left Forearm-Site Assessment: Clean, dry, & intact     WOUND (if present)   Wound Type:  none   Dressing present Dressing Present : No   Wound Concerns/Notes:  none     PAIN    Pain Assessment    Pain Intensity 1: 0 (04/16/17 0240)    Pain Location 1: Hip    Pain Intervention(s) 1: Medication (see MAR)    Patient Stated Pain Goal: 4  o Interventions for Pain:  Chemical  o Intervention effective: yes  o Time of last intervention: 04/16/17  o Reassessment Completed: yes      Last 3 Weights:  Last 3 Recorded Weights in this Encounter    04/12/17 1413 04/14/17 2340 04/16/17 0033   Weight: 154.2 kg (340 lb) 98.3 kg (216 lb 11.4 oz) 98.4 kg (217 lb)     Weight change: 0.131 kg (4.6 oz)     INTAKE/OUPUT    Current Shift: 04/15 1901 - 04/16 0700  In: 550 [I.V.:550]  Out: -     Last three shifts: 04/14 0701 - 04/15 1900  In: -   Out: 400 [Urine:400]     LAB RESULTS     Recent Labs      04/15/17   0402   WBC  7.1   HGB  12.9   HCT  40.2   PLT  147        Recent Labs      04/16/17   0247  04/15/17   0402   NA  139  137   K  4.0  3.9   GLU  196*  157*   BUN  8  7   CREA  0.86  0.96   CA  8.9  9.3   MG   --   1.2*       RECOMMENDATIONS AND DISCHARGE PLANNING     1. Pending tests/procedures/ Plan of Care or Other Needs: none     2. Discharge plan for patient and Needs/Barriers: TBD    3. Estimated Discharge Date: 4/18/17 Posted on Whiteboard in [facility]: yes      4. The patient's care plan was reviewed with the oncoming nurse. \"HEALS\" SAFETY CHECK      Fall Risk    Total Score: 3    Safety Measures: Safety Measures: Bed/Chair alarm on, Bed/Chair-Wheels locked, Bed in low position, Call light within reach    A safety check occurred in the patient's room between off going nurse and oncoming nurse listed above.     The safety check included the below items  Area Items   H  High Alert Medications - Verify all high alert medication drips (heparin, PCA, etc.)   E  Equipment - Suction is set up for ALL patients (with yanker)  - Red plugs utilized for all equipment (IV pumps, etc.)  - WOWs wiped down at end of shift.  - Room stocked with oxygen, suction, and other unit-specific supplies   A  Alarms - Bed alarm is set for fall risk patients  - Ensure chair alarm is in place and activated if patient is up in a chair   L  Lines - Check IV for any infiltration  - Renae bag is empty if patient has a Ernae   - Tubing and IV bags are labeled S  Safety   - Room is clean, patient is clean, and equipment is clean. - Hallways are clear from equipment besides carts. - Fall bracelet on for fall risk patients  - Ensure room is clear and free of clutter  - Suction is set up for ALL patients (with lashay)  - Hallways are clear from equipment besides carts.    - Isolation precautions followed, supplies available outside room, sign posted     Tala Johnson RN

## 2017-04-16 NOTE — PROGRESS NOTES
THERESA Sinha seen and examined. AO x 3    C/o right hip pain, hx DJD lumbar spine, previously under care Dr. Rodriguez Ogden    S/p Hip X ray 15 April 17    Visit Vitals    /65 (BP 1 Location: Left arm, BP Patient Position: At rest)    Pulse 73    Temp 98.4 °F (36.9 °C)    Resp 18    Ht 5' 5\" (1.651 m)    Wt 217 lb (98.4 kg)    SpO2 92%    BMI 36.11 kg/m2           Left hand: unchanged: focal swelling to dorsal left hand. 2 areas of slight erythema dorsal hand    Swelling to all fingers, but DARI flexor/extensor sheaths. (A) Flexion all digits lacking 3 cm palm to pulp flexion, (A) extension lacking 10 degrees all digits, cap refill < 2 sec. Left foot: dorsal eschar to left foot dorsum diameter 2.5cm, area dry, no erythema, minimal TTP. Right hip: (P) ROM IR/ER supine painful at 10 and 12 degrees respectfully, DNVI fully RLE. Femoral nerve and Quad function (+) but guarded RLE. No lesions nor masses Right Hip / posterior lateral gluteus. DIAGNOSTIC IMAGING     EXAM: MRI of the left hand without contrast     INDICATION: IV drug abuse. Pain left hand. Swelling.     TECHNIQUE: MRI of the left hand performed. The following sequences obtained:  Axial T2 fat sat, axial T1, coronal T2 fat sat, coronal T1, coronal gradient  echo, sagittal T2 2 fat sat.      COMPARISON: Plain film dated 4/12/2017     FINDINGS:   The marrow signal of the left hand is unremarkable. No evidence of osteomyelitis  in the left hand or left wrist. Degenerative changes are noted in the first [facility]  joint. Diffuse dorsal subcutaneous edema is present. No fluid collection  appreciated. The visualized flexor and extensor tendons are unremarkable. The  carpal tunnel is unremarkable. The median nerve is unremarkable. Guyon's canal  appears grossly unremarkable within the visualized portion.     IMPRESSION  IMPRESSION:   1. Diffuse dorsal subcutaneous edema without evidence of fluid collection.     2.  No evidence of osteomyelitis      Subtle calcifications lateral to the right ischial tuberosity are likely  traction enthesophytes as seen on CT. These are stable. The hip is intact and  normally positioned. No significant degenerative changes. Findings of avascular  necrosis of the right hip on CT are not visible radiographically. Left hip is  intact and normally positioned without significant degenerative change. There  are mild degenerative changes of the lower lumbar spine. No diastases of the  pubic symphysis or sacroiliac joints. No lytic or blastic lesions.     IMPRESSION  IMPRESSION:     No degenerative changes of either hip. Subtle findings of AVN of the right hip  on CT are not visible by x-ray. Traction enthesophytes from the right ischial  tuberosity are stable.     Mild degenerative changes of the lower lumbar spine    MRI Results (most recent):    Results from Hospital Encounter encounter on 04/12/17   MRI ANKLE LT WO CONT   Narrative EXAM: MRI of the left ankle without contrast    INDICATION: Recent IV drug abuse. Left foot edema. TECHNIQUE: MRI of the left ankle obtained without contrast. The following  sequences obtained: Axial T2 fat sat, axial T1, coronal T2 fat sat, coronal T1,  sagittal T2 fat sat and sagittal T1.    COMPARISON: X-ray of the left foot dated 4/12/2017    FINDINGS:   In the distal tibia, there is a 0.8 x 0.9 x 1.5 cm structure with heterogeneous  signal. This may represent a small focus of osteonecrosis or enchondroma. The  tibiotalar joint is intact and unremarkable. The distal tibia and fibula are  without evidence of acute fracture. The subtalar joint has minimal degenerative  changes in the posterior portion. The talonavicular joint is unremarkable. The  calcaneocuboid articulation is grossly unremarkable. The navicular cuneiform  articulation has a dorsal synovial cyst. There is otherwise grossly  unremarkable. The tarsometatarsal joints are grossly unremarkable.  The  visualized portions the metatarsals are unremarkable. In the dorsal cuboid has  mild edema that measures 11 x 12 x 10 mm. The kidneys tendon is grossly unremarkable. The peroneal tendons are  unremarkable. The posterior tibialis, flexor digitorum longus and flexor  hallucis longus tendons are grossly unremarkable. The tibials anterior tendon  and extensor distal longus tendon is grossly unremarkable. The extensor hallucis  longus is unremarkable in the proximal portion. However, it is not  well-visualized at the naviculocuneiform articulation. There is extensive  subcutaneous edema in the dorsum of the foot at about the level of the navicular  cuneiform rotation. The extensor hallucis longus appears to be present distal to  the subcutaneous edema. The plantar fascia is unremarkable. Impression IMPRESSION:   1. No direct evidence of osteomyelitis. 2.  Edema in the cuboid. 3.  Extensive dorsal subcutaneous edema at the level of naviculocuneiform joint  and possible involvement of the extensor hallucis longus tendon. 4.  Osteonecrosis or enchondroma in the distal tibia. 5.  Dorsal synovial cyst from the naviculocuneiform joint. Impression  1.) IVDA  Left hand and left foot: at this time. No abscess. Recommend warm compresses left foot and left hand. Will follow to monitor as an abscess can still develop.  To OR per Dr. Kevan Richardson early week 17 April 17    2.) Right Hip pain chronic    3.) PT/OT OOB to chair ROM stretching    4.) Subtle right hip AVN, via CT, no plain film radiographic changes noted      Enrrique Fitzpatrick PA-C  4/16/2017

## 2017-04-17 ENCOUNTER — ANESTHESIA (OUTPATIENT)
Dept: SURGERY | Age: 62
DRG: 364 | End: 2017-04-17
Payer: MEDICAID

## 2017-04-17 ENCOUNTER — ANESTHESIA EVENT (OUTPATIENT)
Dept: SURGERY | Age: 62
DRG: 364 | End: 2017-04-17
Payer: MEDICAID

## 2017-04-17 LAB
ANION GAP BLD CALC-SCNC: 9 MMOL/L (ref 3–18)
BASOPHILS # BLD AUTO: 0 K/UL (ref 0–0.06)
BASOPHILS # BLD: 0 % (ref 0–2)
BUN SERPL-MCNC: 9 MG/DL (ref 7–18)
BUN/CREAT SERPL: 9 (ref 12–20)
CALCIUM SERPL-MCNC: 8.9 MG/DL (ref 8.5–10.1)
CHLORIDE SERPL-SCNC: 102 MMOL/L (ref 100–108)
CO2 SERPL-SCNC: 30 MMOL/L (ref 21–32)
CREAT SERPL-MCNC: 0.96 MG/DL (ref 0.6–1.3)
DATE LAST DOSE: NORMAL
DIFFERENTIAL METHOD BLD: NORMAL
EOSINOPHIL # BLD: 0.1 K/UL (ref 0–0.4)
EOSINOPHIL NFR BLD: 2 % (ref 0–5)
ERYTHROCYTE [DISTWIDTH] IN BLOOD BY AUTOMATED COUNT: 13.2 % (ref 11.6–14.5)
GLUCOSE BLD STRIP.AUTO-MCNC: 186 MG/DL (ref 70–110)
GLUCOSE BLD STRIP.AUTO-MCNC: 234 MG/DL (ref 70–110)
GLUCOSE BLD STRIP.AUTO-MCNC: 60 MG/DL (ref 70–110)
GLUCOSE BLD STRIP.AUTO-MCNC: 73 MG/DL (ref 70–110)
GLUCOSE BLD STRIP.AUTO-MCNC: 92 MG/DL (ref 70–110)
GLUCOSE BLD STRIP.AUTO-MCNC: 99 MG/DL (ref 70–110)
GLUCOSE SERPL-MCNC: 185 MG/DL (ref 74–99)
HCT VFR BLD AUTO: 38.1 % (ref 35–45)
HGB BLD-MCNC: 12.4 G/DL (ref 12–16)
LYMPHOCYTES # BLD AUTO: 29 % (ref 21–52)
LYMPHOCYTES # BLD: 2.2 K/UL (ref 0.9–3.6)
MAGNESIUM SERPL-MCNC: 1.4 MG/DL (ref 1.6–2.6)
MCH RBC QN AUTO: 27.4 PG (ref 24–34)
MCHC RBC AUTO-ENTMCNC: 32.5 G/DL (ref 31–37)
MCV RBC AUTO: 84.3 FL (ref 74–97)
MONOCYTES # BLD: 0.5 K/UL (ref 0.05–1.2)
MONOCYTES NFR BLD AUTO: 7 % (ref 3–10)
NEUTS SEG # BLD: 4.7 K/UL (ref 1.8–8)
NEUTS SEG NFR BLD AUTO: 62 % (ref 40–73)
PLATELET # BLD AUTO: 151 K/UL (ref 135–420)
PMV BLD AUTO: 9.9 FL (ref 9.2–11.8)
POTASSIUM SERPL-SCNC: 4.6 MMOL/L (ref 3.5–5.5)
RBC # BLD AUTO: 4.52 M/UL (ref 4.2–5.3)
REPORTED DOSE,DOSE: NORMAL UNITS
REPORTED DOSE/TIME,TMG: 1900
SODIUM SERPL-SCNC: 141 MMOL/L (ref 136–145)
VANCOMYCIN TROUGH SERPL-MCNC: 11.2 UG/ML (ref 10–20)
WBC # BLD AUTO: 7.5 K/UL (ref 4.6–13.2)

## 2017-04-17 PROCEDURE — 87070 CULTURE OTHR SPECIMN AEROBIC: CPT | Performed by: HOSPITALIST

## 2017-04-17 PROCEDURE — 74011250636 HC RX REV CODE- 250/636

## 2017-04-17 PROCEDURE — 65270000029 HC RM PRIVATE

## 2017-04-17 PROCEDURE — 80202 ASSAY OF VANCOMYCIN: CPT | Performed by: HOSPITALIST

## 2017-04-17 PROCEDURE — 74011250637 HC RX REV CODE- 250/637: Performed by: HOSPITALIST

## 2017-04-17 PROCEDURE — 74011250636 HC RX REV CODE- 250/636: Performed by: HOSPITALIST

## 2017-04-17 PROCEDURE — 36415 COLL VENOUS BLD VENIPUNCTURE: CPT | Performed by: HOSPITALIST

## 2017-04-17 PROCEDURE — 87102 FUNGUS ISOLATION CULTURE: CPT | Performed by: HOSPITALIST

## 2017-04-17 PROCEDURE — 77030018846 HC SOL IRR STRL H20 ICUM -A

## 2017-04-17 PROCEDURE — 0JBR0ZZ EXCISION OF LEFT FOOT SUBCUTANEOUS TISSUE AND FASCIA, OPEN APPROACH: ICD-10-PCS | Performed by: ORTHOPAEDIC SURGERY

## 2017-04-17 PROCEDURE — 74011636637 HC RX REV CODE- 636/637: Performed by: HOSPITALIST

## 2017-04-17 PROCEDURE — 80048 BASIC METABOLIC PNL TOTAL CA: CPT | Performed by: HOSPITALIST

## 2017-04-17 PROCEDURE — 0JBK0ZZ EXCISION OF LEFT HAND SUBCUTANEOUS TISSUE AND FASCIA, OPEN APPROACH: ICD-10-PCS | Performed by: ORTHOPAEDIC SURGERY

## 2017-04-17 PROCEDURE — 77030010509 HC AIRWY LMA MSK TELE -A: Performed by: ANESTHESIOLOGY

## 2017-04-17 PROCEDURE — 87077 CULTURE AEROBIC IDENTIFY: CPT | Performed by: HOSPITALIST

## 2017-04-17 PROCEDURE — 77010033678 HC OXYGEN DAILY: Performed by: HOSPITALIST

## 2017-04-17 PROCEDURE — 82962 GLUCOSE BLOOD TEST: CPT

## 2017-04-17 PROCEDURE — 74011250636 HC RX REV CODE- 250/636: Performed by: NURSE ANESTHETIST, CERTIFIED REGISTERED

## 2017-04-17 PROCEDURE — 77030011640 HC PAD GRND REM COVD -A: Performed by: ORTHOPAEDIC SURGERY

## 2017-04-17 PROCEDURE — 85025 COMPLETE CBC W/AUTO DIFF WBC: CPT | Performed by: HOSPITALIST

## 2017-04-17 PROCEDURE — 83735 ASSAY OF MAGNESIUM: CPT | Performed by: HOSPITALIST

## 2017-04-17 PROCEDURE — 76010000138 HC OR TIME 0.5 TO 1 HR: Performed by: ORTHOPAEDIC SURGERY

## 2017-04-17 PROCEDURE — 87186 SC STD MICRODIL/AGAR DIL: CPT | Performed by: HOSPITALIST

## 2017-04-17 PROCEDURE — 74011250636 HC RX REV CODE- 250/636: Performed by: INTERNAL MEDICINE

## 2017-04-17 PROCEDURE — 74011000258 HC RX REV CODE- 258

## 2017-04-17 PROCEDURE — 74011000250 HC RX REV CODE- 250

## 2017-04-17 PROCEDURE — 76060000032 HC ANESTHESIA 0.5 TO 1 HR: Performed by: ORTHOPAEDIC SURGERY

## 2017-04-17 PROCEDURE — 87075 CULTR BACTERIA EXCEPT BLOOD: CPT | Performed by: HOSPITALIST

## 2017-04-17 PROCEDURE — 76210000016 HC OR PH I REC 1 TO 1.5 HR: Performed by: ORTHOPAEDIC SURGERY

## 2017-04-17 PROCEDURE — 77030020782 HC GWN BAIR PAWS FLX 3M -B: Performed by: ORTHOPAEDIC SURGERY

## 2017-04-17 PROCEDURE — 74011000258 HC RX REV CODE- 258: Performed by: HOSPITALIST

## 2017-04-17 PROCEDURE — 77030018836 HC SOL IRR NACL ICUM -A: Performed by: ORTHOPAEDIC SURGERY

## 2017-04-17 RX ORDER — PREDNISONE 5 MG/1
TABLET ORAL
Qty: 30 TAB | Refills: 0 | OUTPATIENT
Start: 2017-04-17 | End: 2017-04-20

## 2017-04-17 RX ORDER — SODIUM CHLORIDE, SODIUM LACTATE, POTASSIUM CHLORIDE, CALCIUM CHLORIDE 600; 310; 30; 20 MG/100ML; MG/100ML; MG/100ML; MG/100ML
75 INJECTION, SOLUTION INTRAVENOUS CONTINUOUS
Status: DISCONTINUED | OUTPATIENT
Start: 2017-04-17 | End: 2017-04-17 | Stop reason: HOSPADM

## 2017-04-17 RX ORDER — ONDANSETRON 2 MG/ML
4 INJECTION INTRAMUSCULAR; INTRAVENOUS ONCE
Status: DISCONTINUED | OUTPATIENT
Start: 2017-04-17 | End: 2017-04-17 | Stop reason: HOSPADM

## 2017-04-17 RX ORDER — DIPHENHYDRAMINE HYDROCHLORIDE 50 MG/ML
12.5 INJECTION, SOLUTION INTRAMUSCULAR; INTRAVENOUS
Status: DISCONTINUED | OUTPATIENT
Start: 2017-04-17 | End: 2017-04-17 | Stop reason: HOSPADM

## 2017-04-17 RX ORDER — ONDANSETRON 2 MG/ML
INJECTION INTRAMUSCULAR; INTRAVENOUS AS NEEDED
Status: DISCONTINUED | OUTPATIENT
Start: 2017-04-17 | End: 2017-04-17 | Stop reason: HOSPADM

## 2017-04-17 RX ORDER — PROPOFOL 10 MG/ML
INJECTION, EMULSION INTRAVENOUS AS NEEDED
Status: DISCONTINUED | OUTPATIENT
Start: 2017-04-17 | End: 2017-04-17 | Stop reason: HOSPADM

## 2017-04-17 RX ORDER — FENTANYL CITRATE 50 UG/ML
25 INJECTION, SOLUTION INTRAMUSCULAR; INTRAVENOUS AS NEEDED
Status: DISCONTINUED | OUTPATIENT
Start: 2017-04-17 | End: 2017-04-17 | Stop reason: HOSPADM

## 2017-04-17 RX ORDER — MAGNESIUM SULFATE HEPTAHYDRATE 40 MG/ML
2 INJECTION, SOLUTION INTRAVENOUS ONCE
Status: COMPLETED | OUTPATIENT
Start: 2017-04-17 | End: 2017-04-17

## 2017-04-17 RX ORDER — LIDOCAINE HYDROCHLORIDE 20 MG/ML
INJECTION, SOLUTION EPIDURAL; INFILTRATION; INTRACAUDAL; PERINEURAL AS NEEDED
Status: DISCONTINUED | OUTPATIENT
Start: 2017-04-17 | End: 2017-04-17 | Stop reason: HOSPADM

## 2017-04-17 RX ORDER — MIDAZOLAM HYDROCHLORIDE 1 MG/ML
INJECTION, SOLUTION INTRAMUSCULAR; INTRAVENOUS AS NEEDED
Status: DISCONTINUED | OUTPATIENT
Start: 2017-04-17 | End: 2017-04-17 | Stop reason: HOSPADM

## 2017-04-17 RX ORDER — FENTANYL CITRATE 50 UG/ML
INJECTION, SOLUTION INTRAMUSCULAR; INTRAVENOUS AS NEEDED
Status: DISCONTINUED | OUTPATIENT
Start: 2017-04-17 | End: 2017-04-17 | Stop reason: HOSPADM

## 2017-04-17 RX ORDER — SODIUM CHLORIDE 0.9 % (FLUSH) 0.9 %
5-10 SYRINGE (ML) INJECTION AS NEEDED
Status: DISCONTINUED | OUTPATIENT
Start: 2017-04-17 | End: 2017-04-17 | Stop reason: HOSPADM

## 2017-04-17 RX ADMIN — FENTANYL CITRATE 25 MCG: 50 INJECTION INTRAMUSCULAR; INTRAVENOUS at 20:56

## 2017-04-17 RX ADMIN — Medication 1 MG: at 22:57

## 2017-04-17 RX ADMIN — MEROPENEM 1 G: 1 INJECTION, POWDER, FOR SOLUTION INTRAVENOUS at 19:31

## 2017-04-17 RX ADMIN — MEROPENEM 1 G: 1 INJECTION, POWDER, FOR SOLUTION INTRAVENOUS at 19:40

## 2017-04-17 RX ADMIN — PROPOFOL 150 MG: 10 INJECTION, EMULSION INTRAVENOUS at 19:44

## 2017-04-17 RX ADMIN — INSULIN GLARGINE 45 UNITS: 100 INJECTION, SOLUTION SUBCUTANEOUS at 23:03

## 2017-04-17 RX ADMIN — ROSUVASTATIN CALCIUM 5 MG: 5 TABLET ORAL at 22:54

## 2017-04-17 RX ADMIN — DIVALPROEX SODIUM 250 MG: 250 TABLET, DELAYED RELEASE ORAL at 22:55

## 2017-04-17 RX ADMIN — VANCOMYCIN HYDROCHLORIDE 1750 MG: 10 INJECTION, POWDER, LYOPHILIZED, FOR SOLUTION INTRAVENOUS at 15:08

## 2017-04-17 RX ADMIN — FENTANYL CITRATE 25 MCG: 50 INJECTION INTRAMUSCULAR; INTRAVENOUS at 21:01

## 2017-04-17 RX ADMIN — Medication 1 MG: at 15:20

## 2017-04-17 RX ADMIN — SODIUM CHLORIDE 75 ML/HR: 900 INJECTION, SOLUTION INTRAVENOUS at 11:55

## 2017-04-17 RX ADMIN — FENTANYL CITRATE 100 MCG: 50 INJECTION, SOLUTION INTRAMUSCULAR; INTRAVENOUS at 19:44

## 2017-04-17 RX ADMIN — FENTANYL CITRATE 25 MCG: 50 INJECTION INTRAMUSCULAR; INTRAVENOUS at 21:11

## 2017-04-17 RX ADMIN — MEROPENEM 1 G: 1 INJECTION, POWDER, FOR SOLUTION INTRAVENOUS at 10:51

## 2017-04-17 RX ADMIN — STANDARDIZED SENNA CONCENTRATE AND DOCUSATE SODIUM 2 TABLET: 8.6; 5 TABLET, FILM COATED ORAL at 22:55

## 2017-04-17 RX ADMIN — MEROPENEM 1 G: 1 INJECTION, POWDER, FOR SOLUTION INTRAVENOUS at 02:07

## 2017-04-17 RX ADMIN — ONDANSETRON 4 MG: 2 INJECTION INTRAMUSCULAR; INTRAVENOUS at 19:44

## 2017-04-17 RX ADMIN — MIDAZOLAM HYDROCHLORIDE 2 MG: 1 INJECTION, SOLUTION INTRAMUSCULAR; INTRAVENOUS at 19:40

## 2017-04-17 RX ADMIN — TRAZODONE HYDROCHLORIDE 100 MG: 100 TABLET ORAL at 22:55

## 2017-04-17 RX ADMIN — TIOTROPIUM BROMIDE 18 MCG: 18 CAPSULE ORAL; RESPIRATORY (INHALATION) at 10:45

## 2017-04-17 RX ADMIN — Medication 1 MG: at 04:47

## 2017-04-17 RX ADMIN — MAGNESIUM SULFATE HEPTAHYDRATE 2 G: 40 INJECTION, SOLUTION INTRAVENOUS at 17:22

## 2017-04-17 RX ADMIN — Medication 1 MG: at 11:18

## 2017-04-17 RX ADMIN — FENTANYL CITRATE 25 MCG: 50 INJECTION INTRAMUSCULAR; INTRAVENOUS at 21:06

## 2017-04-17 RX ADMIN — GABAPENTIN 300 MG: 300 CAPSULE ORAL at 22:54

## 2017-04-17 RX ADMIN — LEVOTHYROXINE SODIUM 75 MCG: 75 TABLET ORAL at 06:37

## 2017-04-17 RX ADMIN — LIDOCAINE HYDROCHLORIDE 20 MG: 20 INJECTION, SOLUTION EPIDURAL; INFILTRATION; INTRACAUDAL; PERINEURAL at 19:44

## 2017-04-17 NOTE — PROGRESS NOTES
Bedside and Verbal shift change report given to Kaden Flores RN(oncoming nurse) by Jia Mendoza LPN (offgoing nurse). Report included the following information SBAR, Kardex, MAR and Recent Results. SITUATION:    Code Status: Full Code   Reason for Admission: Abnormal feces [R19.5]   Encounter for colonoscopy due to history of adenomatous colonic polyps [Z12.11, Z86.010]   Cigarette smoker [Q84.755]    [facility] day: 5   Problem List:       Hospital Problems  Date Reviewed: 4/17/2017          Codes Class Noted POA    Cellulitis and abscess of hand ICD-10-CM: L03.119, L02.519  ICD-9-CM: 682.4  4/13/2017 Unknown        Cellulitis and abscess of foot ICD-10-CM: L03.119, L02.619  ICD-9-CM: 682.7  4/13/2017 Unknown        Cellulitis ICD-10-CM: L03.90  ICD-9-CM: 682.9  4/12/2017 Unknown              BACKGROUND:    Past Medical History:   Past Medical History:   Diagnosis Date    Anxiety     Asthma     Back injury 1986    jumped out of second story window    Behavioral change     Bilateral knee pain     Bipolar disorder ([facility] [address]     Chronic airway obstruction, not elsewhere classified     SOB, on paula O2 Recent admission with psychosis    Chronic back pain     Chronic diastolic heart failure (HCC)     Stable on diuretics    Chronic kidney disease     stage III    Congestive heart failure (HCC)     COPD (chronic obstructive pulmonary disease) ([facility] [address] 10/31/2012    Cramps, muscle, general     Depression     Diabetes mellitus     Difficulty speaking     Difficulty walking     Difficulty writing     DJD (degenerative joint disease) of knee     bilateral, mild    Edema     The edema involves both lower extremities. The episodes last for 1 week. The patient describes this as worsening. Symptoms are exacerbated by prolonged sitting. Symptoms are relieved by leg elevation and diuretics. NO CHANGE    Essential hypertension, benign     Better controlled    Falls frequently     Fatigue     Generalized stiffness     Headache     Heartburn     Hiatal hernia     History of hepatitis C     treated    Hoarseness of voice     Hypothyroidism     Lung disease     Memory difficulty     Morbid obesity (HCC)     Multiple joint pain     Nausea     Numbness and tingling     arms and legs, related to diabetic neuropathy    Osteoarthritis of left knee     Other and unspecified hyperlipidemia     LDL reportedly very high/started on crestor recently by diabetic doctor    Oxygen dependent     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     Sarcoidosis ([facility] [address]     Sickle cell trait ([facility] [address]     Sinusitis     SOB (shortness of breath)     STD (female)     herpes    Swelling of body region     legs and feet    Tinnitus     Venous insufficiency     Vertigo     Weakness generalized     Wears glasses     Weight gain          Patient taking anticoagulants no     ASSESSMENT:    Changes in Assessment Throughout Shift: [address] for [facility]    Patient has [employer]: no Patient has Renae Cath: no    Last Vitals:     Vitals:    04/17/17 0800 04/17/17 1147 04/17/17 1428 04/17/17 1716   BP: 124/78 105/64 (!) 137/94 115/67   Pulse: 76 67 63 68   Resp: 18 18 20 19   Temp: 98.1 °F (36.7 °C) 98.1 °F (36.7 °C) 98.3 °F (36.8 °C) 98.3 °F (36.8 °C)   SpO2: 100% 95% 93% 98%   Weight:       Height:       LMP: 11/25/2012        IV and DRAINS (will only show if present)   Peripheral IV 04/15/17 Right; Outer Forearm-Site Assessment: Clean, dry, & intact  [REMOVED] Peripheral IV 04/12/17 Left Forearm-Site Assessment: Clean, dry, & intact     WOUND (if present)   Wound Type: left foot and left hand   Dressing present Dressing Present : No   Wound Concerns/Notes:  none     PAIN    Pain Assessment    Pain Intensity 1: 3 (04/17/17 1700)    Pain Location 1: Hip    Pain Intervention(s) 1: Distraction    Patient Stated Pain Goal: 0  o Interventions for Pain:  Morphine  o Intervention effective: yes  o Time of last intervention: 1520 o Reassessment Completed: yes      Last 3 Weights:  Last 3 Recorded Weights in this Encounter    04/14/17 2340 04/16/17 0033 04/17/17 0445   Weight: 98.3 kg (216 lb 11.4 oz) 98.4 kg (217 lb) 102.3 kg (225 lb 8 oz)     Weight change: 3.856 kg (8 lb 8 oz)     INTAKE/OUPUT    Current Shift:      Last three shifts: 04/16 0701 - 04/17 1900  In: -   Out: 1525 [Urine:1525]     LAB RESULTS     Recent Labs      04/17/17   0306  04/15/17   0402   WBC  7.5  7.1   HGB  12.4  12.9   HCT  38.1  40.2   PLT  151  147        Recent Labs      04/17/17   0306  04/16/17   0247  04/15/17   0402   NA  141  139  137   K  4.6  4.0  3.9   GLU  185*  196*  157*   BUN  9  8  7   CREA  0.96  0.86  0.96   CA  8.9  8.9  9.3   MG  1.4*   --   1.2*       RECOMMENDATIONS AND DISCHARGE PLANNING     1. Pending tests/procedures/ Plan of Care or Other Needs: Labs in am     2. Discharge plan for patient and Needs/Barriers: Home    3. Estimated Discharge Date: 4/20/2017 Posted on Whiteboard in [facility]: yes      4. The patient's care plan was reviewed with the oncoming nurse. \"HEALS\" SAFETY CHECK      Fall Risk    Total Score: 3    Safety Measures: Safety Measures: Bed/Chair alarm on, Bed/Chair-Wheels locked, Bed in low position, Call light within reach, Fall prevention (comment), Side rails X 3    A safety check occurred in the patient's room between off going nurse and oncoming nurse listed above.     The safety check included the below items  Area Items   H  High Alert Medications - Verify all high alert medication drips (heparin, PCA, etc.)   E  Equipment - Suction is set up for ALL patients (with yanker)  - Red plugs utilized for all equipment (IV pumps, etc.)  - WOWs wiped down at end of shift.  - Room stocked with oxygen, suction, and other unit-specific supplies   A  Alarms - Bed alarm is set for fall risk patients  - Ensure chair alarm is in place and activated if patient is up in a chair   L  Lines - Check IV for any infiltration  - Renae bag is empty if patient has a Renae   - Tubing and IV bags are labeled   S  Safety   - Room is clean, patient is clean, and equipment is clean. - Hallways are clear from equipment besides carts. - Fall bracelet on for fall risk patients  - Ensure room is clear and free of clutter  - Suction is set up for ALL patients (with chantellker)  - Hallways are clear from equipment besides carts.    - Isolation precautions followed, supplies available outside room, sign posted     Joshua Olivares LPN

## 2017-04-17 NOTE — PROGRESS NOTES
[facility]  Progress Note    Patient: Lakhwinder Sinha Age: 62 y.o. : 1955 MR#: [id number] SSN: [id number]  Date/Time: 2017     Subjective:     Review of systems  General: No fevers or chills. Cardiovascular: No chest pain or pressure. No palpitations. Pulmonary: chronic wheezing    Gastrointestinal: No abdominal pain, nausea, vomiting or diarrhea. Genitourinary: No urinary frequency, urgency, hesitancy or dysuria. Musculoskeletal: No joint or muscle pain, no back pain, no recent trauma. Neurologic: No headache, numbness, tingling or weakness. Assessment/Plan:   1. Left hand cellulitis - afebrile ,   2 IVDA   3 COPD - Chronic wheezing   4 Morbid obesity - BMI - 36.11  5 hypomagnesemia   6 DM2  7 tobacco abuse   8 hypothyroidism     PLAN  - Plan for I&D this afternoon , NPO and Lovenox on hold   - Continue antibiotics   - Replace mg++  - continue Merrem   - Continue Basal and SSI insulin   - Tobacco quitting d/w patient   - Continue thyroid replacement   -Continue BD , Discontinue Prednisone after surgery         Case discussed with:  [x]Patient  []Family  [x]Nursing  []Case Management  DVT Prophylaxis:  [x]Lovenox  []Hep SQ  []SCDs  []Coumadin   []On Heparin gtt    Objective:   VS:   Visit Vitals    BP (!) 137/94 (BP 1 Location: Left arm, BP Patient Position: At rest)    Pulse 63    Temp 98.3 °F (36.8 °C)    Resp 20    Ht 5' 5\" (1.651 m)    Wt 102.3 kg (225 lb 8 oz)    LMP 2012    SpO2 93%    BMI 37.53 kg/m2      Tmax/24hrs: Temp (24hrs), Av.3 °F (36.8 °C), Min:98.1 °F (36.7 °C), Max:98.8 °F (37.1 °C)  IOBRIEF  Intake/Output Summary (Last 24 hours) at 17 1438  Last data filed at 17 1819   Gross per 24 hour   Intake                0 ml   Output              625 ml   Net             -625 ml       General:  Alert, cooperative, no acute distress    HEENT:  NC, Atraumatic. PERRLA, anicteric sclerae.   Pulmonary:  CTA Bilaterally. Bilateral scattered wheezing   Cardiovascular: Regular rate and Rhythm. GI:  Soft, Non distended, Non tender. + Bowel sounds. Extremities:  No edema, cyanosis, clubbing. No calf tenderness. Left hand tender and swollen   Psych:  Not anxious or agitated. Neurologic: Grossly - Motor and Sensory functions are intact .  No Anxiety , calm , no Agitation         Medications:   Current Facility-Administered Medications   Medication Dose Route Frequency    fluconazole (DIFLUCAN) tablet 200 mg  200 mg Oral DAILY    insulin glargine (LANTUS) injection 45 Units  45 Units SubCUTAneous Q12H    magnesium oxide (MAG-OX) tablet 400 mg  400 mg Oral BID    polyethylene glycol (MIRALAX) packet 17 g  17 g Oral DAILY    senna-docusate (PERICOLACE) 8.6-50 mg per tablet 2 Tab  2 Tab Oral QHS    acetaminophen (TYLENOL) tablet 500 mg  500 mg Oral Q6H PRN    vancomycin (VANCOCIN) 1,750 mg in 0.9% sodium chloride 500 mL IVPB  1,750 mg IntraVENous Q18H    ARIPiprazole (ABILIFY) tablet 10 mg  10 mg Oral DAILY    aspirin delayed-release tablet 81 mg  81 mg Oral DAILY    divalproex DR (DEPAKOTE) tablet 250 mg  250 mg Oral QHS    eplerenone (INSPRA) tablet 25 mg  25 mg Oral DAILY    ergocalciferol (ERGOCALCIFEROL) capsule 50,000 Units  50,000 Units Oral DAILY    gabapentin (NEURONTIN) capsule 300 mg  300 mg Oral TID    folic acid-vit M9-IRG K35 (FOLTX) 2.5-25-2 mg tablet 1 Tab  1 Tab Oral DAILY    levothyroxine (SYNTHROID) tablet 75 mcg  75 mcg Oral ACB    metOLazone (ZAROXOLYN) tablet 2.5 mg  2.5 mg Oral Q MON, WED & SUN    potassium chloride (KLOR-CON) packet 20 mEq  20 mEq Oral DAILY    predniSONE (DELTASONE) tablet 5 mg  5 mg Oral DAILY    rosuvastatin (CRESTOR) tablet 5 mg  5 mg Oral QHS    sertraline (ZOLOFT) tablet 50 mg  50 mg Oral DAILY    traZODone (DESYREL) tablet 100 mg  100 mg Oral QHS    0.9% sodium chloride infusion  75 mL/hr IntraVENous CONTINUOUS    HYDROcodone-acetaminophen (NORCO) 5-325 mg per tablet 1 Tab  1 Tab Oral Q4H PRN    naloxone (NARCAN) injection 0.4 mg  0.4 mg IntraVENous PRN    insulin lispro (HUMALOG) injection   SubCUTAneous AC&HS    glucose chewable tablet 16 g  4 Tab Oral PRN    glucagon (GLUCAGEN) injection 1 mg  1 mg IntraMUSCular PRN    dextrose (D50W) injection syrg 12.5-25 g  25-50 mL IntraVENous PRN    meropenem (MERREM) 1 g in 0.9% sodium chloride (MBP/ADV) 50 mL MBP  1 g IntraVENous Q8H    morphine injection 1 mg  1 mg IntraVENous Q4H PRN    ondansetron (ZOFRAN) injection 4 mg  4 mg IntraVENous Q6H PRN    tiotropium (SPIRIVA) inhalation capsule 18 mcg  1 Cap Inhalation DAILY    albuterol-ipratropium (DUO-NEB) 2.5 MG-0.5 MG/3 ML  3 mL Nebulization Q6H PRN    umeclidinium-vilanterol (ANORO ELLIPTA) 62.5 mcg- 25 mcg/inhalation  1 Puff Inhalation DAILY       Labs:    Recent Results (from the past 24 hour(s))   GLUCOSE, POC    Collection Time: 04/16/17  4:25 PM   Result Value Ref Range    Glucose (POC) 288 (H) 70 - 110 mg/dL   GLUCOSE, POC    Collection Time: 04/16/17  9:20 PM   Result Value Ref Range    Glucose (POC) 262 (H) 70 - 504 mg/dL   METABOLIC PANEL, BASIC    Collection Time: 04/17/17  3:06 AM   Result Value Ref Range    Sodium 141 136 - 145 mmol/L    Potassium 4.6 3.5 - 5.5 mmol/L    Chloride 102 100 - 108 mmol/L    CO2 30 21 - 32 mmol/L    Anion gap 9 3.0 - 18 mmol/L    Glucose 185 (H) 74 - 99 mg/dL    BUN 9 7.0 - 18 MG/DL    Creatinine 0.96 0.6 - 1.3 MG/DL    BUN/Creatinine ratio 9 (L) 12 - 20      GFR est AA >60 >60 ml/min/1.73m2    GFR est non-AA 59 (L) >60 ml/min/1.73m2    Calcium 8.9 8.5 - 10.1 MG/DL   CBC WITH AUTOMATED DIFF    Collection Time: 04/17/17  3:06 AM   Result Value Ref Range    WBC 7.5 4.6 - 13.2 K/uL    RBC 4.52 4.20 - 5.30 M/uL    HGB 12.4 12.0 - 16.0 g/dL    HCT 38.1 35.0 - 45.0 %    MCV 84.3 74.0 - 97.0 FL    MCH 27.4 24.0 - 34.0 PG    MCHC 32.5 31.0 - 37.0 g/dL    RDW 13.2 11.6 - 14.5 %    PLATELET 774 892 - 206 K/uL    MPV 9.9 9.2 - 11.8 FL    NEUTROPHILS 62 40 - 73 %    LYMPHOCYTES 29 21 - 52 %    MONOCYTES 7 3 - 10 %    EOSINOPHILS 2 0 - 5 %    BASOPHILS 0 0 - 2 %    ABS. NEUTROPHILS 4.7 1.8 - 8.0 K/UL    ABS. LYMPHOCYTES 2.2 0.9 - 3.6 K/UL    ABS. MONOCYTES 0.5 0.05 - 1.2 K/UL    ABS. EOSINOPHILS 0.1 0.0 - 0.4 K/UL    ABS.  BASOPHILS 0.0 0.0 - 0.06 K/UL    DF AUTOMATED     MAGNESIUM    Collection Time: 04/17/17  3:06 AM   Result Value Ref Range    Magnesium 1.4 (L) 1.6 - 2.6 mg/dL   GLUCOSE, POC    Collection Time: 04/17/17  8:18 AM   Result Value Ref Range    Glucose (POC) 234 (H) 70 - 110 mg/dL   GLUCOSE, POC    Collection Time: 04/17/17 11:45 AM   Result Value Ref Range    Glucose (POC) 186 (H) 70 - 110 mg/dL       Signed By: Christiano Cobb MD     April 17, 2017

## 2017-04-17 NOTE — PROGRESS NOTES
Had spoken to Dr. Terrance Mullins regarding Humalog and Lantus doses. He was informed of Blood Glucose readings: 234 mg/dl at 0818 and 186 mg/dl @ 1145, and that patient is on NPO for Irrigation and Debridement of Left Hand Dorsum and Left Foot Dorsum. He said to Hold Humalog and Lantus until after the procedure.

## 2017-04-17 NOTE — ANESTHESIA PREPROCEDURE EVALUATION
Anesthetic History               Review of Systems / Medical History  Patient summary reviewed and pertinent labs reviewed    Pulmonary    COPD      Undiagnosed apnea and smoker  Asthma        Neuro/Psych              Cardiovascular    Hypertension          CAD      Comments: Drug abuse   GI/Hepatic/Renal                Endo/Other    Diabetes: type 2  Hypothyroidism  Morbid obesity     Other Findings   Comments:   Risk Factors for Postoperative nausea/vomiting:       History of postoperative nausea/vomiting? NO       Female? YES       Motion sickness? NO       Intended opioid administration for postoperative analgesia?   YES           Physical Exam    Airway  Mallampati: III  TM Distance: 4 - 6 cm  Neck ROM: decreased range of motion   Mouth opening: Diminished (comment)     Cardiovascular    Rhythm: irregular  Rate: normal         Dental    Dentition: Poor dentition and Full upper dentures     Pulmonary      Decreased breath sounds: bilateral           Abdominal  GI exam deferred       Other Findings            Anesthetic Plan    ASA: 3  Anesthesia type: general          Induction: Intravenous  Anesthetic plan and risks discussed with: Patient      High risk

## 2017-04-17 NOTE — PROGRESS NOTES
OT orders received and chart reviewed. Patient pending I&D of L hand and foot today. Will discontinue OT order. Please re-order when medically stable, will need updated WB status and ROM orders for LUE. Thank you.     Jenine Closs OTR/L

## 2017-04-17 NOTE — PROGRESS NOTES
THERESA Sinha seen this AM. She says her left ankle eschar is draining and that her left hand dorsum, has become more swollen. Visit Vitals    /71 (BP 1 Location: Left arm, BP Patient Position: At rest)    Pulse 67    Temp 98.2 °F (36.8 °C)    Resp 18    Ht 5' 5\" (1.651 m)    Wt 225 lb 8 oz (102.3 kg)    SpO2 92%    BMI 37.53 kg/m2      On examination 4/17/2017 , the patient is alert, oriented (name, place, time) and follows commands. she is in no acute distress and her affect and mood are appropriate. Left hand: focal more swelling to hand dorsum. red . Fluctuant. No drainage. Moves all fingers well. NT flexor/extensor tendons. Left foot: slightly  Moist dorsal left foot eschar. No pus, no fluctuance. Moves ankle well.      CMP:   Lab Results   Component Value Date/Time     04/17/2017 03:06 AM    K 4.6 04/17/2017 03:06 AM     04/17/2017 03:06 AM    CO2 30 04/17/2017 03:06 AM    AGAP 9 04/17/2017 03:06 AM     (H) 04/17/2017 03:06 AM    BUN 9 04/17/2017 03:06 AM    CREA 0.96 04/17/2017 03:06 AM    GFRAA >60 04/17/2017 03:06 AM    GFRNA 59 (L) 04/17/2017 03:06 AM    CA 8.9 04/17/2017 03:06 AM    MG 1.4 (L) 04/17/2017 03:06 AM     CBC:   Lab Results   Component Value Date/Time    WBC 7.5 04/17/2017 03:06 AM    HGB 12.4 04/17/2017 03:06 AM    HCT 38.1 04/17/2017 03:06 AM     04/17/2017 03:06 AM     COAGS: No results found for: APTT, PTP, INR     Impression  Left hand cellulitis and l;t foot eschar (IVDA)    1. NPO, for I and D LEFT HAND TODAY (late afternoon)   PLEASE OBTAIN     1. CONSENT FOR: IRRIGATION AND DEBRIDEMENT OF LEFT HAND DORSUM   2. CONSENT FOR: IRRIGATION AND DEBRIDEMENT OF LEFT FOOT DORSUM: [address] AND REMOVE MOIST ESCHAR LEFT FOOT    2. I STOPPED THE LOVENOX THIS AM 4/17/2017 6:41 AM.    Rl Byrd MD  4/17/2017  6:40 AM

## 2017-04-17 NOTE — PERIOP NOTES
TRANSFER - IN REPORT:    Verbal report received from Freddie Francis on [address]  being received from [facility] for routine progression of care      Report consisted of patients Situation, Background, Assessment and   Recommendations(SBAR). Information from the following report(s) SBAR was reviewed with the receiving nurse. Opportunity for questions and clarification was provided. Assessment completed upon patients arrival to unit and care assumed. Freddie Francis aware to complete vital signs, blood sugar, and chg wipes, and UDS prior to patient transfer.

## 2017-04-17 NOTE — ROUTINE PROCESS
Bedside and Verbal shift change report given to Jeancarlos Rawls LPN, and Governor JENNY Bond (oncoming nurses) by Gabriella Schmitt RN (offgoing nurse). Report included the following information SBAR, Kardex, MAR and Recent Results. SITUATION:    Code Status: Full Code  Reason for Admission: Abnormal feces [R19.5]  Encounter for colonoscopy due to history of adenomatous colonic polyps [Z12.11, Z86.010]   Cigarette smoker [E97.188]    [facility] day: 5   Problem List:       Hospital Problems  Date Reviewed: 2/9/2017          Codes Class Noted POA    Cellulitis and abscess of hand ICD-10-CM: L03.119, L02.519  ICD-9-CM: 682.4  4/13/2017 Unknown        Cellulitis and abscess of foot ICD-10-CM: L03.119, L02.619  ICD-9-CM: 682.7  4/13/2017 Unknown        Cellulitis ICD-10-CM: L03.90  ICD-9-CM: 682.9  4/12/2017 Unknown              BACKGROUND:    Past Medical History:   Past Medical History:   Diagnosis Date    Anxiety     Asthma     Back injury 1986    jumped out of second story window    Behavioral change     Bilateral knee pain     B[facility] disorder ([facility] [address]     Chronic airway obstruction, not elsewhere classified     SOB, on paula O2 Recent admission with psychosis    Chronic back pain     Chronic diastolic heart failure (HCC)     Stable on diuretics    Chronic kidney disease     stage III    Congestive heart failure ([address]     COPD (chronic obstructive pulmonary disease) ([facility] [address] 10/31/2012    Cramps, muscle, general     Depression     Diabetes mellitus     Difficulty speaking     Difficulty walking     Difficulty writing     DJD (degenerative joint disease) of knee     bilateral, mild    Edema     The edema involves both lower extremities. The episodes last for 1 week. The patient describes this as worsening. Symptoms are exacerbated by prolonged sitting. Symptoms are relieved by leg elevation and diuretics. NO CHANGE    Essential hypertension, benign     Better controlled    Falls frequently     Fatigue     Generalized stiffness     Headache     Heartburn     Hiatal hernia     History of hepatitis C     treated    Hoarseness of voice     Hypothyroidism     Lung disease     Memory difficulty     Morbid obesity (HCC)     Multiple joint pain     Nausea     Numbness and tingling     arms and legs, related to diabetic neuropathy    Osteoarthritis of left knee     Other and unspecified hyperlipidemia     LDL reportedly very high/started on crestor recently by diabetic doctor    Oxygen dependent     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     [facility]arc[facility] [address]     Sickle cell trait ([facility] [address]     Sinusitis     SOB (shortness of breath)     STD (female)     herpes    Swelling of body region     legs and feet    Tinnitus     Venous insufficiency     Vertigo     Weakness generalized     Wears glasses     Weight gain          Patient taking anticoagulants no     ASSESSMENT:    Changes in Assessment Throughout Shift: none     Patient has Central Line: no Reasons if yes: n/a   Patient has Renae Cath: no Reasons if yes: n/a      Last Vitals:     Vitals:    04/16/17 1953 04/17/17 0014 04/17/17 0423 04/17/17 0445   BP: 110/75 115/74 121/71    Pulse: 73 67 67    Resp: 20 18 18    Temp: 98.3 °F (36.8 °C) 98.3 °F (36.8 °C) 98.2 °F (36.8 °C)    SpO2: 96% 95% 92%    Weight:    102.3 kg (225 lb 8 oz)   Height:       LMP: 11/25/2012        IV and DRAINS (will only show if present)   Peripheral IV 04/15/17 Right; Outer Forearm-Site Assessment: Clean, dry, & intact  [REMOVED] Peripheral IV 04/12/17 Left Forearm-Site Assessment: Clean, dry, & intact     WOUND (if present)   Wound Type:  none   Dressing present Dressing Present : No   Wound Concerns/Notes:  none     PAIN    Pain Assessment    Pain Intensity 1: 3 (04/17/17 0520)    Pain Location 1: Hip    Pain Intervention(s) 1: Medication (see MAR)    Patient Stated Pain Goal: 0  o Interventions for Pain:  Chemical  o Intervention effective: yes  o Time of last intervention: 04/17/17  o Reassessment Completed: yes      Last 3 Weights:  Last 3 Recorded Weights in this Encounter    04/14/17 2340 04/16/17 0033 04/17/17 0445   Weight: 98.3 kg (216 lb 11.4 oz) 98.4 kg (217 lb) 102.3 kg (225 lb 8 oz)     Weight change: 3.856 kg (8 lb 8 oz)     INTAKE/OUPUT    Current Shift:      Last three shifts: 04/15 1901 - 04/17 0700  In: 550 [I.V.:550]  Out: 1525 [Urine:1525]     LAB RESULTS     Recent Labs      04/17/17   0306  04/15/17   0402   WBC  7.5  7.1   HGB  12.4  12.9   HCT  38.1  40.2   PLT  151  147        Recent Labs      04/17/17   0306  04/16/17   0247  04/15/17   0402   NA  141  139  137   K  4.6  4.0  3.9   GLU  185*  196*  157*   BUN  9  8  7   CREA  0.96  0.86  0.96   CA  8.9  8.9  9.3   MG  1.4*   --   1.2*       RECOMMENDATIONS AND DISCHARGE PLANNING     1. Pending tests/procedures/ Plan of Care or Other Needs: none     2. Discharge plan for patient and Needs/Barriers: TBD    3. Estimated Discharge Date: 4/20/17 Posted on Whiteboard in [facility] Room: yes      4. The patient's care plan was reviewed with the oncoming nurse. \"HEALS\" SAFETY CHECK      Fall Risk    Total Score: 3    Safety Measures: Safety Measures: Bed/Chair-Wheels locked, Bed in low position, Call light within reach, Fall prevention (comment)    A safety check occurred in the patient's room between off going nurse and oncoming nurse listed above.     The safety check included the below items  Area Items   H  High Alert Medications - Verify all high alert medication drips (heparin, PCA, etc.)   E  Equipment - Suction is set up for ALL patients (with yanker)  - Red plugs utilized for all equipment (IV pumps, etc.)  - WOWs wiped down at end of shift.  - Room stocked with oxygen, suction, and other unit-specific supplies   A  Alarms - Bed alarm is set for fall risk patients  - Ensure chair alarm is in place and activated if patient is up in a chair   L  Lines - Check IV for any infiltration  - Renae bag is empty if patient has a Renae   - Tubing and IV bags are labeled   S  Safety   - Room is clean, patient is clean, and equipment is clean. - Hallways are clear from equipment besides carts. - Fall bracelet on for fall risk patients  - Ensure room is clear and free of clutter  - Suction is set up for ALL patients (with yanker)  - Hallways are clear from equipment besides carts.    - Isolation precautions followed, supplies available outside room, sign posted     Reji Paulson RN

## 2017-04-17 NOTE — PERIOP NOTES
Jenetta Simmonds [place]  62 y.o.  [id number]  1955  [id number]    Referring physician: Surgeon(s):  Pinky Benavides MD    Procedure: Additional peripheral IV;      Size:  20 G    Side: left    Site: upper arm left, condition patent and no redness        Indication:       Deirdre Jordan MD  4/17/2017  7:01 PM

## 2017-04-17 NOTE — PROGRESS NOTES
Order received, chart reviewed. Patient is scheduled for I & D of L hand, L foot today. Will need new orders with WB status and ROM recommendations. Will discharge current orders.

## 2017-04-17 NOTE — INTERDISCIPLINARY ROUNDS
IDR/SLIDR Summary          Patient: Moy Bañuelos MRN: [id number]    Age: 62 y.o. YOB: 1955 Room/Bed: [facility]/   Admit Diagnosis: Abnormal feces [R19.5]  Encounter for colonoscopy due to history of adenomatous colonic polyps [Z12.11, Z86.010]  Cigarette smoker [F17.210]  Principal Diagnosis: <principal problem not specified>   Goals: infection control and diabetes management  Readmission: NO  Quality Measure: Not applicable  VTE Prophylaxis: Chemical  Influenza Vaccine screening completed? YES  Pneumococcal Vaccine screening completed? YES  Mobility needs: Yes   Nutrition plan:No  Consults:Case Management    Financial concerns:No  Escalated to CM? YES  RRAT Score: 24   [facility]   Testing due for pt today?  NO  LOS: 5 days Expected length of stay 5 days  Discharge plan: home   PCP: Blanca Lamb MD  Transportation needs: No    Days before discharge:two or more days before discharge   Discharge disposition: Home    Signed:     Alex Mukherjee RN  4/17/2017  5:01 AM

## 2017-04-18 LAB
ANION GAP BLD CALC-SCNC: 5 MMOL/L (ref 3–18)
BACTERIA SPEC CULT: NORMAL
BACTERIA SPEC CULT: NORMAL
BUN SERPL-MCNC: 10 MG/DL (ref 7–18)
BUN/CREAT SERPL: 7 (ref 12–20)
CALCIUM SERPL-MCNC: 8.6 MG/DL (ref 8.5–10.1)
CHLORIDE SERPL-SCNC: 102 MMOL/L (ref 100–108)
CO2 SERPL-SCNC: 33 MMOL/L (ref 21–32)
CREAT SERPL-MCNC: 1.34 MG/DL (ref 0.6–1.3)
GLUCOSE BLD STRIP.AUTO-MCNC: 181 MG/DL (ref 70–110)
GLUCOSE BLD STRIP.AUTO-MCNC: 201 MG/DL (ref 70–110)
GLUCOSE BLD STRIP.AUTO-MCNC: 209 MG/DL (ref 70–110)
GLUCOSE BLD STRIP.AUTO-MCNC: 313 MG/DL (ref 70–110)
GLUCOSE SERPL-MCNC: 164 MG/DL (ref 74–99)
MAGNESIUM SERPL-MCNC: 1.6 MG/DL (ref 1.6–2.6)
POTASSIUM SERPL-SCNC: 4 MMOL/L (ref 3.5–5.5)
SERVICE CMNT-IMP: NORMAL
SERVICE CMNT-IMP: NORMAL
SODIUM SERPL-SCNC: 140 MMOL/L (ref 136–145)

## 2017-04-18 PROCEDURE — 80048 BASIC METABOLIC PNL TOTAL CA: CPT | Performed by: HOSPITALIST

## 2017-04-18 PROCEDURE — 74011636637 HC RX REV CODE- 636/637: Performed by: HOSPITALIST

## 2017-04-18 PROCEDURE — 77010033678 HC OXYGEN DAILY

## 2017-04-18 PROCEDURE — 74011250636 HC RX REV CODE- 250/636: Performed by: INTERNAL MEDICINE

## 2017-04-18 PROCEDURE — 82962 GLUCOSE BLOOD TEST: CPT

## 2017-04-18 PROCEDURE — 36415 COLL VENOUS BLD VENIPUNCTURE: CPT | Performed by: HOSPITALIST

## 2017-04-18 PROCEDURE — 77030019895 HC PCKNG STRP IODO -A

## 2017-04-18 PROCEDURE — 74011250637 HC RX REV CODE- 250/637: Performed by: HOSPITALIST

## 2017-04-18 PROCEDURE — 83735 ASSAY OF MAGNESIUM: CPT | Performed by: HOSPITALIST

## 2017-04-18 PROCEDURE — 74011250636 HC RX REV CODE- 250/636: Performed by: HOSPITALIST

## 2017-04-18 PROCEDURE — 74011000258 HC RX REV CODE- 258: Performed by: HOSPITALIST

## 2017-04-18 PROCEDURE — 65270000029 HC RM PRIVATE

## 2017-04-18 PROCEDURE — 94640 AIRWAY INHALATION TREATMENT: CPT

## 2017-04-18 RX ORDER — DIPHENHYDRAMINE HYDROCHLORIDE 50 MG/ML
25 INJECTION, SOLUTION INTRAMUSCULAR; INTRAVENOUS
Status: DISCONTINUED | OUTPATIENT
Start: 2017-04-18 | End: 2017-04-20 | Stop reason: HOSPADM

## 2017-04-18 RX ORDER — LEVOFLOXACIN 5 MG/ML
750 INJECTION, SOLUTION INTRAVENOUS EVERY 24 HOURS
Status: DISCONTINUED | OUTPATIENT
Start: 2017-04-18 | End: 2017-04-19

## 2017-04-18 RX ORDER — MAGNESIUM SULFATE 1 G/100ML
1 INJECTION INTRAVENOUS ONCE
Status: COMPLETED | OUTPATIENT
Start: 2017-04-18 | End: 2017-04-18

## 2017-04-18 RX ADMIN — LEVOTHYROXINE SODIUM 75 MCG: 75 TABLET ORAL at 08:59

## 2017-04-18 RX ADMIN — HYDROCODONE BITARTRATE AND ACETAMINOPHEN 1 TABLET: 5; 325 TABLET ORAL at 01:47

## 2017-04-18 RX ADMIN — EPLERENONE 25 MG: 25 TABLET, FILM COATED ORAL at 08:58

## 2017-04-18 RX ADMIN — Medication 1 MG: at 04:37

## 2017-04-18 RX ADMIN — HYDROCODONE BITARTRATE AND ACETAMINOPHEN 1 TABLET: 5; 325 TABLET ORAL at 21:09

## 2017-04-18 RX ADMIN — ERGOCALCIFEROL 50000 UNITS: 1.25 CAPSULE ORAL at 08:59

## 2017-04-18 RX ADMIN — INSULIN GLARGINE 45 UNITS: 100 INJECTION, SOLUTION SUBCUTANEOUS at 22:48

## 2017-04-18 RX ADMIN — ASPIRIN 81 MG: 81 TABLET, COATED ORAL at 08:57

## 2017-04-18 RX ADMIN — INSULIN LISPRO 6 UNITS: 100 INJECTION, SOLUTION INTRAVENOUS; SUBCUTANEOUS at 09:05

## 2017-04-18 RX ADMIN — LEVOFLOXACIN 750 MG: 5 INJECTION, SOLUTION INTRAVENOUS at 16:14

## 2017-04-18 RX ADMIN — TRAZODONE HYDROCHLORIDE 100 MG: 100 TABLET ORAL at 22:48

## 2017-04-18 RX ADMIN — MEROPENEM 1 G: 1 INJECTION, POWDER, FOR SOLUTION INTRAVENOUS at 10:48

## 2017-04-18 RX ADMIN — GABAPENTIN 300 MG: 300 CAPSULE ORAL at 16:15

## 2017-04-18 RX ADMIN — Medication 1 TABLET: at 08:59

## 2017-04-18 RX ADMIN — Medication 1 MG: at 09:01

## 2017-04-18 RX ADMIN — PREDNISONE 5 MG: 10 TABLET ORAL at 08:59

## 2017-04-18 RX ADMIN — FLUCONAZOLE 200 MG: 200 TABLET ORAL at 08:58

## 2017-04-18 RX ADMIN — GABAPENTIN 300 MG: 300 CAPSULE ORAL at 08:59

## 2017-04-18 RX ADMIN — MAGNESIUM SULFATE IN DEXTROSE 1 G: 10 INJECTION, SOLUTION INTRAVENOUS at 16:13

## 2017-04-18 RX ADMIN — TIOTROPIUM BROMIDE 18 MCG: 18 CAPSULE ORAL; RESPIRATORY (INHALATION) at 08:45

## 2017-04-18 RX ADMIN — ARIPIPRAZOLE 10 MG: 10 TABLET ORAL at 08:58

## 2017-04-18 RX ADMIN — HYDROCODONE BITARTRATE AND ACETAMINOPHEN 1 TABLET: 5; 325 TABLET ORAL at 11:01

## 2017-04-18 RX ADMIN — STANDARDIZED SENNA CONCENTRATE AND DOCUSATE SODIUM 2 TABLET: 8.6; 5 TABLET, FILM COATED ORAL at 22:48

## 2017-04-18 RX ADMIN — SERTRALINE HYDROCHLORIDE 50 MG: 50 TABLET ORAL at 08:59

## 2017-04-18 RX ADMIN — GABAPENTIN 300 MG: 300 CAPSULE ORAL at 22:47

## 2017-04-18 RX ADMIN — VANCOMYCIN HYDROCHLORIDE 1750 MG: 10 INJECTION, POWDER, LYOPHILIZED, FOR SOLUTION INTRAVENOUS at 09:14

## 2017-04-18 RX ADMIN — INSULIN LISPRO 6 UNITS: 100 INJECTION, SOLUTION INTRAVENOUS; SUBCUTANEOUS at 22:48

## 2017-04-18 RX ADMIN — MEROPENEM 1 G: 1 INJECTION, POWDER, FOR SOLUTION INTRAVENOUS at 03:54

## 2017-04-18 RX ADMIN — INSULIN GLARGINE 45 UNITS: 100 INJECTION, SOLUTION SUBCUTANEOUS at 09:06

## 2017-04-18 RX ADMIN — DIVALPROEX SODIUM 250 MG: 250 TABLET, DELAYED RELEASE ORAL at 22:47

## 2017-04-18 RX ADMIN — INSULIN LISPRO 3 UNITS: 100 INJECTION, SOLUTION INTRAVENOUS; SUBCUTANEOUS at 12:57

## 2017-04-18 RX ADMIN — ROSUVASTATIN CALCIUM 5 MG: 5 TABLET ORAL at 22:47

## 2017-04-18 RX ADMIN — INSULIN LISPRO 12 UNITS: 100 INJECTION, SOLUTION INTRAVENOUS; SUBCUTANEOUS at 17:37

## 2017-04-18 RX ADMIN — SODIUM CHLORIDE 75 ML/HR: 900 INJECTION, SOLUTION INTRAVENOUS at 01:48

## 2017-04-18 RX ADMIN — Medication 1 MG: at 12:55

## 2017-04-18 RX ADMIN — POTASSIUM CHLORIDE 20 MEQ: 1.5 POWDER, FOR SOLUTION ORAL at 09:00

## 2017-04-18 RX ADMIN — POLYETHYLENE GLYCOL 3350 17 G: 17 POWDER, FOR SOLUTION ORAL at 09:00

## 2017-04-18 NOTE — BRIEF OP NOTE
BRIEF OPERATIVE NOTE    Date of Procedure: 4/17/2017   Preoperative Diagnosis: abscess left hand and left dorsal foot abscess  Postoperative Diagnosis: abscess left hand and left dorsal foot abscess    Procedure(s):  INCISION AND DRAINAGE LEFT HAND, SHARP EXCISIONAL DEBRIDEMENT  INCISION AND DRAINAGE LEFT FOOT DORSAL WITH SHARP EXCISIONAL DEBRIDEMENT  Surgeon(s) and Role:     * Dmitriy Harden MD - Primary            Surgical Staff:  Circ-1: Domonique York RN  Scrub Tech-1: Uche Lima  Surg Asst-1: Char Saucedo  Event Time In   Incision Start 1959   Incision Close 2027     Anesthesia: General   .IV FLUIDS:  500 ml IVF  Tourniquet Time:  NONE USED  Estimated Blood Loss: 20 ML  Specimens:   ID Type Source Tests Collected by Time Destination   1 : dorsal left hand abscess Wound Hand CULTURE, ANAEROBIC, CULTURE, WOUND W GRAM STAIN, CULTURE, FUNGUS Dmitriy Harden MD 4/17/2017 2001 Microbiology   2 : dorsal foot abscess left foot Wound Foot, left CULTURE, ANAEROBIC, CULTURE, WOUND W GRAM STAIN, CULTURE, FUNGUS Dmitriy Harden MD 4/17/2017 2016 Microbiology      Findings: FOCAL ABSCESS DORSAL HAND AND DORSAL FOOT   Complications: NONE  Implants: * No implants in log *

## 2017-04-18 NOTE — PROGRESS NOTES
[facility] Hospitalist Group  Progress Note    Patient: Maximino iSnha Age: 62 y.o. : 1955 MR#: [id number] SSN: [id number]  Date/Time: 2017     Subjective:     Review of systems  General: No fevers or chills. Cardiovascular: No chest pain or pressure. No palpitations. Pulmonary: chronic wheezing // mild SOB , no Distress    Gastrointestinal: No abdominal pain, nausea, vomiting or diarrhea. Genitourinary: No urinary frequency, urgency, hesitancy or dysuria. Musculoskeletal: No joint or muscle pain, no back pain, no recent trauma. Neurologic: No headache, numbness, tingling or weakness.      Assessment/Plan:     Left Hand abscess and cellulitis / self injecting IVDA   - S/p I&D by ortho   - post op doing well   - Follow surgical cultures   - continue current antibiotics     Left Foot Cellulitis   - S/P irrigation and debridement   - Local wound care     DM2  - on Lantus , Lispro , continue SSI   - Wide fluctuations - 200 s to 60s   - patient is eating well and no plan for procedure and or NPO   - Monitor blood glucose     Early renal insufficiency   - Increase rate of IVF   - D/W nursing     Hypothyroid   - Continue replacement    COPD acute   - patient appears comfortable at rest   - Chronic wheezing   - Mild SOB now and then per patient is not new   - On BD, Steroids - low dose , and she was not on steroids at home i will discontinue steroids now , this will help with good glucose control in presence of infection      Morbid obesity   - weight reduction d/w patient       Case discussed with:  [x]Patient  []Family  [x]Nursing  []Case Management  DVT Prophylaxis:  [x]Lovenox  []Hep SQ  []SCDs  []Coumadin   []On Heparin gtt    Objective:   VS:   Visit Vitals    /81 (BP 1 Location: Left arm, BP Patient Position: At rest)    Pulse 71    Temp 100 °F (37.8 °C)    Resp 18    Ht 5' 5\" (1.651 m)    Wt 102.3 kg (225 lb 8 oz)    LMP 2012    SpO2 97%    BMI 37.53 kg/m2      Tmax/24hrs: Temp (24hrs), Av.5 °F (36.9 °C), Min:98.1 °F (36.7 °C), Max:100 °F (37.8 °C)  IOBRIEF    Intake/Output Summary (Last 24 hours) at 17 09  Last data filed at 17 2100   Gross per 24 hour   Intake              500 ml   Output              510 ml   Net              -10 ml       General:  Alert, cooperative, no acute distress    HEENT:  NC, Atraumatic. PERRLA, anicteric sclerae. Pulmonary:  CTA Bilaterally. Bilateral scattered wheezing   Cardiovascular: Regular rate and Rhythm. GI:  Soft, Non distended, Non tender. + Bowel sounds. Extremities:  No edema, cyanosis, clubbing. No calf tenderness. Left hand - bandaged , left foot bandaged and dressed    Psych:  Not anxious or agitated. Neurologic: Grossly - Motor and Sensory functions are intact .  No Anxiety , calm , no Agitation         Medications:   Current Facility-Administered Medications   Medication Dose Route Frequency    fluconazole (DIFLUCAN) tablet 200 mg  200 mg Oral DAILY    insulin glargine (LANTUS) injection 45 Units  45 Units SubCUTAneous Q12H    polyethylene glycol (MIRALAX) packet 17 g  17 g Oral DAILY    senna-docusate (PERICOLACE) 8.6-50 mg per tablet 2 Tab  2 Tab Oral QHS    acetaminophen (TYLENOL) tablet 500 mg  500 mg Oral Q6H PRN    vancomycin (VANCOCIN) 1,750 mg in 0.9% sodium chloride 500 mL IVPB  1,750 mg IntraVENous Q18H    ARIPiprazole (ABILIFY) tablet 10 mg  10 mg Oral DAILY    aspirin delayed-release tablet 81 mg  81 mg Oral DAILY    divalproex DR (DEPAKOTE) tablet 250 mg  250 mg Oral QHS    eplerenone (INSPRA) tablet 25 mg  25 mg Oral DAILY    ergocalciferol (ERGOCALCIFEROL) capsule 50,000 Units  50,000 Units Oral DAILY    gabapentin (NEURONTIN) capsule 300 mg  300 mg Oral TID    folic acid-vit D2-UVX I03 (FOLTX) 2.5-25-2 mg tablet 1 Tab  1 Tab Oral DAILY    levothyroxine (SYNTHROID) tablet 75 mcg  75 mcg Oral ACB    metOLazone (ZAROXOLYN) tablet 2.5 mg  2.5 mg Oral Q MON, WED & SUN    potassium chloride (KLOR-CON) packet 20 mEq  20 mEq Oral DAILY    predniSONE (DELTASONE) tablet 5 mg  5 mg Oral DAILY    rosuvastatin (CRESTOR) tablet 5 mg  5 mg Oral QHS    sertraline (ZOLOFT) tablet 50 mg  50 mg Oral DAILY    traZODone (DESYREL) tablet 100 mg  100 mg Oral QHS    0.9% sodium chloride infusion  125 mL/hr IntraVENous CONTINUOUS    HYDROcodone-acetaminophen (NORCO) 5-325 mg per tablet 1 Tab  1 Tab Oral Q4H PRN    naloxone (NARCAN) injection 0.4 mg  0.4 mg IntraVENous PRN    insulin lispro (HUMALOG) injection   SubCUTAneous AC&HS    glucose chewable tablet 16 g  4 Tab Oral PRN    glucagon (GLUCAGEN) injection 1 mg  1 mg IntraMUSCular PRN    dextrose (D50W) injection syrg 12.5-25 g  25-50 mL IntraVENous PRN    meropenem (MERREM) 1 g in 0.9% sodium chloride (MBP/ADV) 50 mL MBP  1 g IntraVENous Q8H    morphine injection 1 mg  1 mg IntraVENous Q4H PRN    ondansetron (ZOFRAN) injection 4 mg  4 mg IntraVENous Q6H PRN    tiotropium (SPIRIVA) inhalation capsule 18 mcg  1 Cap Inhalation DAILY    albuterol-ipratropium (DUO-NEB) 2.5 MG-0.5 MG/3 ML  3 mL Nebulization Q6H PRN    umeclidinium-vilanterol (ANORO ELLIPTA) 62.5 mcg- 25 mcg/inhalation  1 Puff Inhalation DAILY       Labs:    Recent Results (from the past 24 hour(s))   GLUCOSE, POC    Collection Time: 04/17/17 11:45 AM   Result Value Ref Range    Glucose (POC) 186 (H) 70 - 110 mg/dL   VANCOMYCIN, TROUGH    Collection Time: 04/17/17  2:10 PM   Result Value Ref Range    Vancomycin,trough 11.2 10.0 - 20.0 ug/mL    Reported dose date: 20170416      Reported dose time: 1900      Reported dose: 1750 MG UNITS   GLUCOSE, POC    Collection Time: 04/17/17  3:57 PM   Result Value Ref Range    Glucose (POC) 99 70 - 110 mg/dL   CULTURE, SURGICAL WOUND    Collection Time: 04/17/17  8:01 PM   Result Value Ref Range    Special Requests: DORSAL LF HAND ABSCESS     GRAM STAIN FEW  WBC'S        GRAM STAIN RARE  GRAM POSITIVE COCCI  IN PAIRS Culture result: PENDING    CULTURE, FUNGUS    Collection Time: 04/17/17  8:01 PM   Result Value Ref Range    Special Requests: DORSAL LF HAND ABSCESS     FUNGUS SMEAR NO FUNGAL ELEMENTS SEEN      Culture result: PENDING    CULTURE, SURGICAL WOUND    Collection Time: 04/17/17  8:16 PM   Result Value Ref Range    Special Requests: DORSAL FOOT, LF FOOT     GRAM STAIN RARE  WBC'S        GRAM STAIN NO ORGANISMS SEEN      Culture result: PENDING    CULTURE, FUNGUS    Collection Time: 04/17/17  8:16 PM   Result Value Ref Range    Special Requests: DORSAL FOOT, LF FOOT     FUNGUS SMEAR NO FUNGAL ELEMENTS SEEN      Culture result: PENDING    GLUCOSE, POC    Collection Time: 04/17/17  8:38 PM   Result Value Ref Range    Glucose (POC) 60 (L) 70 - 110 mg/dL   GLUCOSE, POC    Collection Time: 04/17/17  9:45 PM   Result Value Ref Range    Glucose (POC) 73 70 - 110 mg/dL   GLUCOSE, POC    Collection Time: 04/17/17 10:27 PM   Result Value Ref Range    Glucose (POC) 92 70 - 938 mg/dL   METABOLIC PANEL, BASIC    Collection Time: 04/18/17  3:00 AM   Result Value Ref Range    Sodium 140 136 - 145 mmol/L    Potassium 4.0 3.5 - 5.5 mmol/L    Chloride 102 100 - 108 mmol/L    CO2 33 (H) 21 - 32 mmol/L    Anion gap 5 3.0 - 18 mmol/L    Glucose 164 (H) 74 - 99 mg/dL    BUN 10 7.0 - 18 MG/DL    Creatinine 1.34 (H) 0.6 - 1.3 MG/DL    BUN/Creatinine ratio 7 (L) 12 - 20      GFR est AA 49 (L) >60 ml/min/1.73m2    GFR est non-AA 40 (L) >60 ml/min/1.73m2    Calcium 8.6 8.5 - 10.1 MG/DL   MAGNESIUM    Collection Time: 04/18/17  3:00 AM   Result Value Ref Range    Magnesium 1.6 1.6 - 2.6 mg/dL   GLUCOSE, POC    Collection Time: 04/18/17  8:48 AM   Result Value Ref Range    Glucose (POC) 201 (H) 70 - 110 mg/dL       Signed By: Mishel Chester MD     April 18, 2017

## 2017-04-18 NOTE — PROGRESS NOTES
Bedside and Verbal shift change report given to Na Stern LPN (oncoming nurse) by Cali Dalal RN (offgoing nurse). Report included the following information SBAR, Kardex, MAR and Recent Results. SITUATION:    Code Status: Full Code  Reason for Admission: Abnormal feces [R19.5]  Encounter for colonoscopy due to history of adenomatous colonic polyps [Z12.11, Z86.010]   Cigarette smoker [G83.096]    [facility] day: 6   Problem List:       Hospital Problems  Date Reviewed: 4/17/2017          Codes Class Noted POA    Cellulitis and abscess of hand ICD-10-CM: L03.119, L02.519  ICD-9-CM: 682.4  4/13/2017 Unknown        Cellulitis and abscess of foot ICD-10-CM: L03.119, L02.619  ICD-9-CM: 682.7  4/13/2017 Unknown        Cellulitis ICD-10-CM: L03.90  ICD-9-CM: 682.9  4/12/2017 Unknown              BACKGROUND:    Past Medical History:   Past Medical History:   Diagnosis Date    Anxiety     Asthma     Back injury 1986    jumped out of second story window    Behavioral change     Bilateral knee pain     B[facility] disorder ([facility] [address]     Chronic airway obstruction, not elsewhere classified     SOB, on paula O2 Recent admission with psychosis    Chronic back pain     Chronic diastolic heart failure (HCC)     Stable on diuretics    Chronic kidney disease     stage III    Congestive heart failure (HCC)     COPD (chronic obstructive pulmonary disease) ([facility] [address] 10/31/2012    Cramps, muscle, general     Depression     Diabetes mellitus     Difficulty speaking     Difficulty walking     Difficulty writing     DJD (degenerative joint disease) of knee     bilateral, mild    Edema     The edema involves both lower extremities. The episodes last for 1 week. The patient describes this as worsening. Symptoms are exacerbated by prolonged sitting. Symptoms are relieved by leg elevation and diuretics. NO CHANGE    Essential hypertension, benign     Better controlled    Falls frequently     Fatigue     Generalized stiffness     Headache     Heartburn     Hiatal hernia     History of hepatitis C     treated    Hoarseness of voice     Hypothyroidism     Lung disease     Memory difficulty     Morbid obesity (HCC)     Multiple joint pain     Nausea     Numbness and tingling     arms and legs, related to diabetic neuropathy    Osteoarthritis of left knee     Other and unspecified hyperlipidemia     LDL reportedly very high/started on crestor recently by diabetic doctor    Oxygen dependent     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     [facility]arc[facility]osis [facility] [address]     Sickle cell trait ([facility] [address]     Sinusitis     SOB (shortness of breath)     STD (female)     herpes    Swelling of body region     legs and feet    Tinnitus     Venous insufficiency     Vertigo     Weakness generalized     Wears glasses     Weight gain          Patient taking anticoagulants no     ASSESSMENT:    Changes in Assessment Throughout Shift: none    Patient has Central Line: no Patient has Renae Cath: no    Last Vitals:     Vitals:    04/17/17 2102 04/17/17 2122 04/17/17 2225 04/18/17 0410   BP: 158/72 107/69 106/69 122/73   Pulse: 84 72 81 84   Resp: 16 15 18 18   Temp:   98.5 °F (36.9 °C) 98.4 °F (36.9 °C)   SpO2: 100%  93% 96%   Weight:       Height:       LMP: 11/25/2012        IV and DRAINS (will only show if present)   Peripheral IV 04/15/17 Right; Outer Forearm-Site Assessment: Clean, dry, & intact  Peripheral IV 04/17/17 Right Arm-Site Assessment: Clean, dry, & intact  Peripheral IV 04/17/17 Left Antecubital-Site Assessment: Clean, dry, & intact  [REMOVED] Peripheral IV 04/12/17 Left Forearm-Site Assessment: Clean, dry, & intact     WOUND (if present)   Wound Type: left foot and left hand   Dressing present Dressing Present : No   Wound Concerns/Notes:  none     PAIN    Pain Assessment    Pain Intensity 1: 8 (04/18/17 0437)    Pain Location 1: Foot, Hand    Pain Intervention(s) 1: Elevation, Medication (see MAR)    Patient Stated Pain Goal: 0  o Interventions for Pain:  Morphine  o Intervention effective: yes  o Time of last intervention: 0437  o Reassessment Completed: yes      Last 3 Weights:  Last 3 Recorded Weights in this Encounter    04/14/17 2340 04/16/17 0033 04/17/17 0445   Weight: 98.3 kg (216 lb 11.4 oz) 98.4 kg (217 lb) 102.3 kg (225 lb 8 oz)     Weight change:      INTAKE/OUPUT    Current Shift:      Last three shifts: 04/16 1901 - 04/18 0700  In: 500 [I.V.:500]  Out: 510 [Urine:500]     LAB RESULTS     Recent Labs      04/17/17   0306   WBC  7.5   HGB  12.4   HCT  38.1   PLT  151        Recent Labs      04/18/17   0300  04/17/17   0306  04/16/17   0247   NA  140  141  139   K  4.0  4.6  4.0   GLU  164*  185*  196*   BUN  10  9  8   CREA  1.34*  0.96  0.86   CA  8.6  8.9  8.9   MG  1.6  1.4*   --        RECOMMENDATIONS AND DISCHARGE PLANNING     1. Pending tests/procedures/ Plan of Care or Other Needs: wound care, antibiotics    2. Discharge plan for patient and Needs/Barriers: Home    3. Estimated Discharge Date: 4/20/2017 Posted on Whiteboard in [facility]: yes      4. The patient's care plan was reviewed with the oncoming nurse. \"HEALS\" SAFETY CHECK      Fall Risk    Total Score: 3    Safety Measures: Safety Measures: Bed/Chair alarm on, Bed/Chair-Wheels locked, Bed in low position, Call light within reach, Fall prevention (comment), Side rails X 3    A safety check occurred in the patient's room between off going nurse and oncoming nurse listed above.     The safety check included the below items  Area Items   H  High Alert Medications - Verify all high alert medication drips (heparin, PCA, etc.)   E  Equipment - Suction is set up for ALL patients (with yanker)  - Red plugs utilized for all equipment (IV pumps, etc.)  - WOWs wiped down at end of shift.  - Room stocked with oxygen, suction, and other unit-specific supplies   A  Alarms - Bed alarm is set for fall risk patients  - Ensure chair alarm is in place and activated if patient is up in a chair   L  Lines - Check IV for any infiltration  - Renae bag is empty if patient has a Renae   - Tubing and IV bags are labeled   S  Safety   - Room is clean, patient is clean, and equipment is clean. - Hallways are clear from equipment besides carts. - Fall bracelet on for fall risk patients  - Ensure room is clear and free of clutter  - Suction is set up for ALL patients (with yanker)  - Hallways are clear from equipment besides carts.    - Isolation precautions followed, supplies available outside room, sign posted     Chris Zarate RN

## 2017-04-18 NOTE — ANESTHESIA POSTPROCEDURE EVALUATION
Post-Anesthesia Evaluation and Assessment    Patient: Fabby Corrigan MRN: [id number]  SSN: [id number]    YOB: 1955  Age: 62 y.o. Sex: female     VS from flow sheet    Cardiovascular Function/Vital Signs  Visit Vitals    /69    Pulse 72    Temp 36.7 °C (98.1 °F)    Resp 15    Ht 5' 5\" (1.651 m)    Wt 102.3 kg (225 lb 8 oz)    SpO2 100%    BMI 37.53 kg/m2       Patient is status post general anesthesia for Procedure(s):  INCISION AND DRAINAGE LEFT HAND. Nausea/Vomiting: None    Postoperative hydration reviewed and adequate. Pain:  Pain Scale 1: Numeric (0 - 10) (04/17/17 2111)  Pain Intensity 1: 4 (04/17/17 2111)   Managed    Neurological Status:   Neuro (WDL): Within Defined Limits (04/17/17 2032)  Neuro  Neurologic State: Alert;Eyes open spontaneously (04/17/17 0900)  Orientation Level: Oriented X4 (04/17/17 0900)  Cognition: Appropriate decision making (04/17/17 0900)  Speech: Clear (04/17/17 0900)  LUE Motor Response: Purposeful;Weak (04/17/17 0900)  LLE Motor Response: Purposeful;Weak (04/17/17 0900)  RUE Motor Response: Purposeful (04/17/17 0900)  RLE Motor Response: Purposeful (04/17/17 0900)   At baseline    Mental Status and Level of Consciousness: Arousable    Pulmonary Status:   O2 Device: Room air (04/17/17 2036)   Adequate oxygenation and airway patent    Complications related to anesthesia: None    Post-anesthesia assessment completed.  No concerns    Signed By: Garfield Gupta MD     April 17, 2017

## 2017-04-18 NOTE — WOUND CARE
Physical Exam   Room 455: wound assessment  Wound Hand Left;Dorsal (Active) open surgical incision    DRESSING STATUS Intact; Removed 4/18/2017 12:15 PM   DRESSING TYPE Gauze;Packing;Gauze wrap (satnam); Elastic bandage 4/18/2017 12:15 PM   Incision site well approximated? No 4/18/2017 12:15 PM   Non-Pressure Ulcer Full thickness (subcut/muscle) 4/18/2017 12:15 PM   Wound Length (cm) 1.3 cm 4/18/2017 12:15 PM   Wound Width (cm) 0.5 cm 4/18/2017 12:15 PM   Wound Depth (cm) 1 4/18/2017 12:15 PM   Wound Surface area (cm^3) 0.65 cm^2 4/18/2017 12:15 PM   Condition of Base Muscle exposed;Pink 4/18/2017 12:15 PM   Condition of Edges Closed 4/18/2017 12:15 PM   Epithelialization (%) 0 4/18/2017 12:15 PM   Tissue Type Red 4/18/2017 12:15 PM   Tissue Type Percent Red 100 4/18/2017 12:15 PM   Drainage Amount  Small  4/18/2017 12:15 PM   Drainage Color Serosanguinous 4/18/2017 12:15 PM   Wound Odor None 4/18/2017 12:15 PM   Periwound Skin Condition Dark patch laterally 4/18/2017 12:15 PM   Cleansing and Cleansing Agents  Normal saline 4/18/2017 12:15 PM   Dressing Type Applied Gauze;Packing;Gauze wrap (satnam); Elastic bandage 4/18/2017 12:15 PM   Procedure Tolerated Well 4/18/2017 12:15 PM   Number of days:1       Wound Foot Left;Dorsal (Active) open surgical incision   DRESSING STATUS Intact; Removed 4/18/2017 12:15 PM   DRESSING TYPE Gauze;Packing;Gauze wrap (satnam); Elastic bandage 4/18/2017 12:15 PM   Incision site well approximated?  No 4/18/2017 12:15 PM   Non-Pressure Ulcer Full thickness (subcut/muscle) 4/18/2017 12:15 PM   Wound Length (cm) 1.6 cm 4/18/2017 12:15 PM   Wound Width (cm) 1 cm 4/18/2017 12:15 PM   Wound Depth (cm) 0.4 4/18/2017 12:15 PM   Wound Surface area (cm^3) 0.64 cm^2 4/18/2017 12:15 PM   Condition of Base Muscle exposed;Pink 4/18/2017 12:15 PM   Condition of Edges Closed 4/18/2017 12:15 PM   Epithelialization (%) 0 4/18/2017 12:15 PM   Tissue Type Red 4/18/2017 12:15 PM   Tissue Type Percent Red 100 4/18/2017 12:15 PM   Drainage Amount  Small  4/18/2017 12:15 PM   Drainage Color Serosanguinous 4/18/2017 12:15 PM   Wound Odor None 4/18/2017 12:15 PM   Periwound Skin Condition Intact 4/18/2017 12:15 PM   Cleansing and Cleansing Agents  Normal saline 4/18/2017 12:15 PM   Dressing Type Applied Gauze;Packing;Gauze wrap (satnam); Elastic bandage 4/18/2017 12:15 PM   Procedure Tolerated Well 4/18/2017 12:15 PM   Number of days:1       Pt agreeable to continue current treatment. Wound care education provided to pt at this time. Will turn over care to nursing staff at this time.   Brian WARRENN, RN, [facility], [address]

## 2017-04-18 NOTE — OP NOTES
[facility] REPORTS    Name:  Letty Chambers  MR#:  [id number]  :  1955  Account #:  [de-identified]  Date of Adm:  2017  Date of Surgery:  2017      PREOPERATIVE DIAGNOSES:  1. Abscess, dorsal part of the left hand. 2. Abscess dorsal part of left foot. POSTOPERATIVE DIAGNOSES:  1. Abscess, dorsal part of the left hand. 2. Abscess dorsal part of left foot. PROCEDURES PERFORMED:  1. Incision and drainage of the abscess dorsal part of left hand. 2. Incision and drainage left foot dorsal aspect. 3. Sharp excisional debridement, dorsal aspect, left hand. 4. Sharp excisional debridement, dorsal aspect of the left foot. SURGEON: Rene Shannon MD.    ASSISTANT: Escobar Nevarez (). FLUIDS: 500 mL. ANESTHESIA: General.    No tourniquet. ESTIMATED BLOOD LOSS: 20 mL. SPECIMENS REMOVED: Yes.    1. Cultures, dorsal aspect, left hand. 2. Cultures dorsal aspect of left foot. FINDINGS: Focal abscess dorsal hand and dorsal foot. COMPLICATIONS: None. IMPLANTS: Iodoform dressing. CLINICAL HISTORY: The patient is a 80-year-old female who admits  to IV drug abuse and self-injected herself to the dorsal part of her left  hand, dorsal part of her left foot. She was admitted to the hospital last  week, was followed closely, and with time areas of cellulitis, became  focal abscess. Her initial MRI done 2-3 days ago, did not show an  abscess, but clinically, she gradually formed a focal swelling, as such,  she formed abscess dorsal part of her left hand. She has some  drainage from the dorsal part of her foot through an eschar. She was consented for irrigation and debridement of her left hand,  irrigation and debridement of her left foot.  The risks of surgery  included, but were not limited to bleeding, infection, DVT, PE, death,  RSD, tyrell-incisional numbness, subcutaneous hematoma, post-  surgical scar, possible delayed healing, nonhealing, possible wound  complications. She voiced understanding of the risks of surgery, and  informed decision was made to proceed. DESCRIPTION OF PROCEDURE: As such she was taken to  operating room today, on 04/17/2017. General anesthesia was  conducted. She was carefully positioned supine on the operating table  prior to the general anesthesia. The entire left hand and dorsal part of  left foot was cleansed with chlorhexidine gluconate scrub and  chlorhexidine gluconate yellow prep stick. A formal time-out was  conducted identifying correct limb, correct location for surgery,  confirmation that the patient did receive antibiotics, all members of  surgical team agreed to side timeout. As such, I elected to proceed. I first made a 2 cm incision in the dorsal part of her left hand. Incision  was made through skin and subcutaneous tissues and carefully down  to this fullness. The patient had pus, that came out quite easily. I obtained cultures of this. I thoroughly irrigated this with sharp  excisional debridement of subcutaneous tissue, and abscess was  performed using a #15 blade, and small Littler scissors. After thorough  irrigation, I packed the wound with iodoform. I did use an  electrocauterization for selective hemostasis. I then went down, left foot. The patient had an eschar on the dorsal  part of her left foot. This eschar was removed, in removing the eschar,  then pus was retrieved. I obtained cultures of this. Made an  incision proximal and distal to this eschar. After I removed the eschar  sharp excisional debridement of subcutaneous tissues, abscess, and  granulation tissue was performed. I thoroughly irrigated this wound of  the dorsal part of her left foot. I then packed this with iodoform. Sterile  dressings were each placed to the left hand was 4 x 4's, sterile Kerlix  and an Ace wrap.  The sterile dressing was placed to the left foot  consisting of 4 x 4's, 4-inch Kerlix and Ace wrap. The patient was  extubated and transferred to the recovery room in stable condition. There were no complications.         Abby Sofia MD [facility] / Martha Herring  D:  04/17/2017   20:39  T:  04/18/2017   06:18  Job #:  [id number]

## 2017-04-18 NOTE — PERIOP NOTES
TRANSFER - OUT REPORT:    Verbal report given to Luna ALVARADO on Stephen Sinha  being transferred to [facility] for routine post - op       Report consisted of patients Situation, Background, Assessment and   Recommendations(SBAR). Information from the following report(s) SBAR, OR Summary, Procedure Summary, Intake/Output, MAR and Recent Results was reviewed with the receiving nurse. Lines:   Peripheral IV 04/15/17 Right; Outer Forearm (Active)   Site Assessment Clean, dry, & intact 4/17/2017  9:00 PM   Phlebitis Assessment 0 4/17/2017  9:00 PM   Infiltration Assessment 0 4/17/2017  9:00 PM   Dressing Status Clean, dry, & intact 4/17/2017  9:00 PM   Dressing Type Transparent 4/17/2017  9:00 PM   Hub Color/Line Status Yellow;Capped;Flushed;Patent 4/17/2017  9:00 PM       Peripheral IV 04/17/17 Right Arm (Active)   Site Assessment Clean, dry, & intact 4/17/2017  9:54 PM   Phlebitis Assessment 0 4/17/2017  9:00 PM   Infiltration Assessment 0 4/17/2017  9:00 PM   Dressing Status Clean, dry, & intact 4/17/2017  9:00 PM   Dressing Type Transparent 4/17/2017  9:00 PM   Hub Color/Line Status Pink; Infusing;Patent 4/17/2017  9:00 PM       Peripheral IV 04/17/17 Left Antecubital (Active)   Site Assessment Clean, dry, & intact 4/17/2017  9:00 PM   Phlebitis Assessment 0 4/17/2017  9:00 PM   Infiltration Assessment 0 4/17/2017  9:00 PM   Dressing Status Clean, dry, & intact 4/17/2017  9:00 PM   Dressing Type Transparent;Tape 4/17/2017  9:00 PM   Hub Color/Line Status Pink; Infusing 4/17/2017  9:00 PM        Opportunity for questions and clarification was provided.       Patient transported with:   O2 @ 3 liters  Registered Nurse

## 2017-04-18 NOTE — CONSULTS
Infectious Disease Consultation Note    Requested by: Dr. Janeth Rodriges    Reason: left hand and left foot abscess    Current abx Prior abx   Meropenem, vancomycin since 4/12/17      Lines:       Assessment :    81-year-old black female with h/o IVDA ( last used IV heroin and cocaine 2  days prior to admission) admitted to [facility] on 4/12/17 with increasing left hand and foot pain. Clinical picture consistent with left foot and left hand cellulitis, abscess. Exact microbial etiology of infection not clear. Patient has been appropriately managed with I&D on 4/17. Prior antibiotics will likely interfere with current cultures. Antibiotic de escalation difficult since life threatening allergy to pcn (anaphylaxis 30 years ago). Non life threatening allergy to moxifloxacin. She has tolerated levofloxacin in the past. Patient agrees to try levofloxacin. Benefits and risks of this approach including rash, hives, swelling of throat, anaphylaxis, death explained to patient in details. She verbalized her understanding and wishes to proceed. Nursing staff was advised to monitor closely for allergy. Give antihistaminics & call me if any allergic reaction noted. Recommendations:    1. Discontinue meropenem. Start levofloxacin  2. Continue vancomycin  3. F/u intra op cultures  4. F/u cbc, clinically      Thank you for consultation request. Above plan was discussed in details with patient. Will d/w dr Janeth Rodriges. Please call me if any further questions or concerns. Will continue to participate in the care of this patient. HPI:    The patient is a 81-year-old black female with h/o IVDA ( last used IV heroin and cocaine 2  days prior to admission) admitted to [facility] on 4/12/17 with increasing left hand and foot pain. Patient states that she injected into her left hand and left foot about 2 weeks ago. She subsequently developed increasing redness and swelling. On 4/12/17, she was evaluated by the  emergency room physician .  The patient was given vancomycin and meropenem in the emergency room. She had MRI on 4/14 which didn't reveal any abscess. She was seen by ortho surgeon. She eventually developed abscess and underwent I&D of left hand/foot on 4/17/17. i have been consulted for further recommendations. Patient states that her pain is better. Patient denies headaches, visual disturbances, sore throat, runny nose, earaches, cp, sob, chills, cough, abdominal pain, diarrhea, burning micturition, or weakness in extremities. she denies back pain/flank pain. No h/o recent travel. No known h/o MRSA colonization or infection in the past.      Past Medical History:   Diagnosis Date    Anxiety     Asthma     Back injury 1986    jumped out of second story window    Behavioral change     Bilateral knee pain     Bipolar disorder (HCC)     Chronic airway obstruction, not elsewhere classified     SOB, on paula O2 Recent admission with psychosis    Chronic back pain     Chronic diastolic heart failure (HCC)     Stable on diuretics    Chronic kidney disease     stage III    Congestive heart failure (HCC)     COPD (chronic obstructive pulmonary disease) ([facility] [address] 10/31/2012    Cramps, muscle, general     Depression     Diabetes mellitus     Difficulty speaking     Difficulty walking     Difficulty writing     DJD (degenerative joint disease) of knee     bilateral, mild    Edema     The edema involves both lower extremities. The episodes last for 1 week. The patient describes this as worsening. Symptoms are exacerbated by prolonged sitting. Symptoms are relieved by leg elevation and diuretics. NO CHANGE    Essential hypertension, benign     Better controlled    Falls frequently     Fatigue     Generalized stiffness     Headache     Heartburn     Hiatal hernia     History of hepatitis C     treated    Hoarseness of voice     Hypothyroidism     Lung disease     Memory difficulty     Morbid obesity (HCC)     Multiple joint pain  Nausea     Numbness and tingling     arms and legs, related to diabetic neuropathy    Osteoarthritis of left knee     Other and unspecified hyperlipidemia     LDL reportedly very high/started on crestor recently by diabetic doctor    Oxygen dependent     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     Sarcoidosis ([facility] [address]     Sickle cell trait ([facility] [address]     Sinusitis     SOB (shortness of breath)     STD (female)     herpes    Swelling of body region     legs and feet    Tinnitus     Venous insufficiency     Vertigo     Weakness generalized     Wears glasses     Weight gain        Past Surgical History:   Procedure Laterality Date    HX BACK SURGERY      HX  SECTION      HX TUBAL LIGATION         Home Medication List    Details   predniSONE (DELTASONE) 5 mg tablet TAKE ONE TABLET EVERY DAY  Qty: 30 Tab, Refills: 0       Details   umeclidinium-vilanterol (ANORO ELLIPTA) 62.5-25 mcg/actuation inhaler Take 1 Puff by inhalation daily. Qty: 1 Inhaler, Refills: 5      VENTOLIN HFA 90 mcg/actuation inhaler INHALE 2 PUFFS EVERY 4 HOURS AS NEEDED  Qty: 1 Inhaler, Refills: 4      divalproex DR (DEPAKOTE) 250 mg tablet Take 250 mg by mouth nightly. furosemide (LASIX) 20 mg tablet Take 1 Tab by mouth daily. Qty: 30 Tab, Refills: 0      metOLazone (ZAROXOLYN) 5 mg tablet Take 0.5 Tabs by mouth Every Mon, Wed & Sun.  Qty: 30 Tab, Refills: 6      potassium chloride (KLOR-CON) 20 mEq packet Take 1 Packet by mouth daily. Qty: 30 Each, Refills: 0      clopidogrel (PLAVIX) 75 mg tablet Take 1 Tab by mouth daily. Qty: 30 Tab, Refills: 0      insulin glargine (LANTUS) 100 unit/mL injection 60 units subcutaneously every night  Qty: 1 Vial, Refills: 0      insulin syringe,safetyneedle 1 mL 30 gauge x \" syrg As directed  Qty: 50 Each, Refills: 0      ARIPiprazole (ABILIFY) 5 mg tablet Take 10 mg by mouth daily.     Comments: take 2 tabs each morning      albuterol (PROVENTIL VENTOLIN) 2.5 mg /3 mL (0.083 %) nebulizer solution USE 1 NEB EVERY 4 HOURS FOR WHEEZING AND SHORTNESS OF BREATH  Indications: CHRONIC OBSTRUCTIVE PULMONARY DISEASE  Qty: 2 Package, Refills: 3    Associated Diagnoses: Chronic obstructive pulmonary disease, unspecified COPD type ([facility])      eplerenone (INSPRA) 25 mg tablet Take  by mouth daily. gabapentin (NEURONTIN) 300 mg capsule Take 300 mg by mouth three (3) times daily. acetaminophen (TYLENOL) 325 mg tablet Take 325 mg by mouth every six (6) hours as needed for Pain. traZODone (DESYREL) 100 mg tablet Take 100 mg by mouth nightly. rosuvastatin (CRESTOR) 5 mg tablet Take 1 Tab by mouth nightly. Qty: 30 Tab, Refills: 6      l-methylfolate-vit b12-vit b6 (METANX) 2.8-2-25 mg Tab Take  by mouth. aspirin 81 mg tablet Take 81 mg by mouth daily. Nebulizer Accessories Kit Use with nebulizer machine  Qty: 1 Kit, Refills: prn    Associated Diagnoses: COPD (chronic obstructive pulmonary disease) ([facility])      insulin CONCENTRATED regular (U-500 CONCENTRATED) 500 unit/mL Soln 20 Units by SubCUTAneous route. With breakfast, lunch, and dinner      levothyroxine (SYNTHROID) 100 mcg tablet Take  by mouth Daily (before breakfast). sertraline (ZOLOFT) 100 mg tablet Take 50 mg by mouth daily. Oxygen-Air Delivery Systems by Nasal route continuous. 2 LNC      ergocalciferol (VITAMIN D) 50,000 unit capsule Take 50,000 Units by mouth daily.              Current Facility-Administered Medications   Medication Dose Route Frequency    magnesium sulfate 1 g/100 ml IVPB (premix or compounded)  1 g IntraVENous ONCE    [START ON 4/19/2017] VANCOMYCIN TROUGH DUE   Other Daily    fluconazole (DIFLUCAN) tablet 200 mg  200 mg Oral DAILY    insulin glargine (LANTUS) injection 45 Units  45 Units SubCUTAneous Q12H    polyethylene glycol (MIRALAX) packet 17 g  17 g Oral DAILY    senna-docusate (PERICOLACE) 8.6-50 mg per tablet 2 Tab  2 Tab Oral QHS    acetaminophen (TYLENOL) tablet 500 mg  500 mg Oral Q6H PRN    vancomycin (VANCOCIN) 1,750 mg in 0.9% sodium chloride 500 mL IVPB  1,750 mg IntraVENous Q18H    ARIPiprazole (ABILIFY) tablet 10 mg  10 mg Oral DAILY    aspirin delayed-release tablet 81 mg  81 mg Oral DAILY    divalproex DR (DEPAKOTE) tablet 250 mg  250 mg Oral QHS    eplerenone (INSPRA) tablet 25 mg  25 mg Oral DAILY    ergocalciferol (ERGOCALCIFEROL) capsule 50,000 Units  50,000 Units Oral DAILY    gabapentin (NEURONTIN) capsule 300 mg  300 mg Oral TID    folic acid-vit Y3-OSD X15 (FOLTX) 2.5-25-2 mg tablet 1 Tab  1 Tab Oral DAILY    levothyroxine (SYNTHROID) tablet 75 mcg  75 mcg Oral ACB    metOLazone (ZAROXOLYN) tablet 2.5 mg  2.5 mg Oral Q MON, WED & SUN    potassium chloride (KLOR-CON) packet 20 mEq  20 mEq Oral DAILY    predniSONE (DELTASONE) tablet 5 mg  5 mg Oral DAILY    rosuvastatin (CRESTOR) tablet 5 mg  5 mg Oral QHS    sertraline (ZOLOFT) tablet 50 mg  50 mg Oral DAILY    traZODone (DESYREL) tablet 100 mg  100 mg Oral QHS    0.9% sodium chloride infusion  125 mL/hr IntraVENous CONTINUOUS    HYDROcodone-acetaminophen (NORCO) 5-325 mg per tablet 1 Tab  1 Tab Oral Q4H PRN    naloxone (NARCAN) injection 0.4 mg  0.4 mg IntraVENous PRN    insulin lispro (HUMALOG) injection   SubCUTAneous AC&HS    glucose chewable tablet 16 g  4 Tab Oral PRN    glucagon (GLUCAGEN) injection 1 mg  1 mg IntraMUSCular PRN    dextrose (D50W) injection syrg 12.5-25 g  25-50 mL IntraVENous PRN    meropenem (MERREM) 1 g in 0.9% sodium chloride (MBP/ADV) 50 mL MBP  1 g IntraVENous Q8H    morphine injection 1 mg  1 mg IntraVENous Q4H PRN    ondansetron (ZOFRAN) injection 4 mg  4 mg IntraVENous Q6H PRN    tiotropium (SPIRIVA) inhalation capsule 18 mcg  1 Cap Inhalation DAILY    albuterol-ipratropium (DUO-NEB) 2.5 MG-0.5 MG/3 ML  3 mL Nebulization Q6H PRN    umeclidinium-vilanterol (ANORO ELLIPTA) 62.5 mcg- 25 mcg/inhalation  1 Puff Inhalation DAILY       Allergies: Moxifloxacin; Pcn [penicillins]; and Sulfa (sulfonamide antibiotics)    Family History   Problem Relation Age of Onset    Seizures Other     Diabetes Mother     Hypertension Mother     Heart Disease Mother     Cancer Mother     Diabetes Father     Stroke Father     Heart Disease Father     Headache Sister     Depression Neg Hx     Suicide Neg Hx     Psychotic Disorder Neg Hx     Substance Abuse Neg Hx     Dementia Neg Hx      Social History     Social History    Marital status: SINGLE     Spouse name: N/A    Number of children: N/A    Years of education: N/A     Occupational History    Not employed Not Employed     Social History Main Topics    Smoking status: Former Smoker     Packs/day: 0.50     Years: 30.00     Types: Cigarettes     Start date: 1969     Quit date: 10/31/2016    Smokeless tobacco: Never Used      Comment: Per pt. 10 a day     Alcohol use No    Drug use: No      Comment: +Cocaine Screen     Sexual activity: Not on file      Comment:      Other Topics Concern    Not on file     Social History Narrative    Lives with 15year old son. History   Smoking Status    Former Smoker    Packs/day: 0.50    Years: 30.00    Types: Cigarettes    Start date: 1969   [facility] Quit date: 10/31/2016   Smokeless Tobacco    Never Used     Comment: Per pt. 10 a day         Temp (24hrs), Av.6 °F (37 °C), Min:98.1 °F (36.7 °C), Max:100 °F (37.8 °C)    Visit Vitals    /75 (BP 1 Location: Left arm, BP Patient Position: At rest)    Pulse 81    Temp 98.8 °F (37.1 °C)    Resp 18    Ht 5' 5\" (1.651 m)    Wt 102.3 kg (225 lb 8 oz)    LMP 2012    SpO2 95%    BMI 37.53 kg/m2       ROS: 12 point ROS obtained in details. Pertinent positives as mentioned in HPI,   otherwise negative    Physical Exam:    General: Well developed, well nourished female laying on the bed AAOx3 in no acute distress.     General:   awake alert and oriented   HEENT:  Normocephalic, atraumatic, PERRL, EOMI, no scleral icterus or pallor; no conjunctival hemmohage;  nasal and oral mucous are moist and without evidence of lesions. No thrush. Neck supple, no bruits. Lymph Nodes:   no cervical, axillary or inguinal adenopathy   Lungs:   non-labored, bilaterally clear to auscultation- no crackles wheezes rales or rhonchi   Heart:  RRR, s1 and s2; no murmurs rubs or gallops, no edema, + pedal pulses   Abdomen:  soft, non-distended, active bowel sounds, no hepatomegaly, no splenomegaly. Non-tender   Genitourinary:  deferred   Extremities:   no clubbing, cyanosis; no joint effusions or swelling; Full ROM of all large joints to the upper and lower extremities; muscle mass appropriate for age, left hand and left foot dressing not removed   Neurologic:  No gross focal sensory abnormalities; 5/5 muscle strength to upper and lower extremities. Speech appropriate.  Cranial nerves intact                        Skin:  No rash or ulcers noted   Back:  no spinal or paraspinal muscle tenderness or rigidity, no CVA tenderness     Psychiatric:  No suicidal or homicidal ideations, appropriate mood and affect         Labs: Results:   Chemistry Recent Labs      04/18/17   0300  04/17/17   0306  04/16/17   0247   GLU  164*  185*  196*   NA  140  141  139   K  4.0  4.6  4.0   CL  102  102  101   CO2  33*  30  30   BUN  10  9  8   CREA  1.34*  0.96  0.86   CA  8.6  8.9  8.9   AGAP  5  9  8   BUCR  7*  9*  9*      CBC w/Diff Recent Labs      04/17/17   0306   WBC  7.5   RBC  4.52   HGB  12.4   HCT  38.1   PLT  151   GRANS  62   LYMPH  29   EOS  2      Microbiology Recent Labs      04/17/17 2016 04/17/17 2001   CULT  CULTURE IN PROGRESS,FURTHER UPDATES TO FOLLOW  CULTURE IN PROGRESS,FURTHER UPDATES TO FOLLOW  PENDING  CULTURE IN PROGRESS,FURTHER UPDATES TO FOLLOW  CULTURE IN PROGRESS,FURTHER UPDATES TO FOLLOW  PENDING          RADIOLOGY:    All available imaging studies/reports in [facility] for this admission were reviewed    Dr. Amaya Mixon, Infectious Disease Specialist  [phone]  April 18, 2017  1:57 PM

## 2017-04-18 NOTE — PROGRESS NOTES
[facility]                 PROGRESS NOTE      Patient: Brando Darby  MRN: [id number]  SSN: [id number]   YOB: 1955  Age: 62 y.o. Sex: female     Admit Date: 4/12/2017 LOS:  LOS: 6 days      POD # 1 S/P Procedure(s):  INCISION AND DRAINAGE LEFT HAND  AND LEFT FOOT    Consultants following patients: Orthopedics    Case discussed with:  [x]Patient  []Family  []Nursing  []Case Management            Brando Darby is a 62y.o. year old  female who is doing fairly well s/p above mentioned surgery. No additional complaints other than surgical incision pain: her pain is controlled at this time. Stephen Sinha is Tolerating fluids and solids. Stephen RODRIGUEZ Ernestine reports: No difficulty voiding urine. Stephen iSnha Denies Headache, N/V, CP, SOB, ABD or Calf pain. Activity Level: Out of bed with assistance :     No overnight events and remains hemodynamically stable after the surgery.         PLAN       1) Orthopaedic Diagnoses:  Left dorsal foot and hand wounds due to IVDA self injection          Dressing changes: wound care          DVT Prophylaxis : SCD's, Encourage mobilization, Encourage active ankle     DF/PF motion for endogenous fibrinolysis :      YES OK TO RESUME BLOOD THINNERS           Incision Care:  Dressing Change/Reinforce if necessary:          Incentive Spirometry: Encourage its use to minimize atelectasis  2) Pain management -  Continue Current Pain Management Narcotic Analgesia  3) Antibiotics: vancomycin yes for left foot and hand IVDA abscessed  4) PT/OT/ Intervention: Home health care,  consults   PT/OT : VLADIMIR NASH disposition:   5)  Medical Diagnoses: Internal Medicine/Consultants: Brando Darby is on medicine service with Ortho consultation:     Patient Active Problem List   Diagnosis Code    Diabetes mellitus ([facility] [address] E11.9    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 G62.9    Venous insufficiency I87.2    Morbid obesity ([facility] [address] E66.01    Chronic back pain M54.9, G89.29    Oxygen dependent Z99.81    Hypertension I10    Pain in joint, multiple sites M25.50    Encounter for long-term (current) use of other medications Z79.899    Major depressive disorder, recurrent (HCC) F33.9    Bipolar affective disorder ([facility] [address] F31.9    Other and unspecified hyperlipidemia E78.5    Chronic diastolic heart failure (HCC) I50.32    Chronic airway obstruction, not elsewhere classified J44.9    Essential hypertension, benign I10    Chronic kidney disease N18.9    Depression F32.9    Back injury S39. 92XA    Bilateral knee pain M25.561, M25.562    Anxiety F41.9    Wears glasses Z97.3    History of hepatitis C Z86.19    Sickle cell trait (HCC) D57.3    Acute renal failure (HCC) N17.9    Unspecified hypothyroidism E03.9    Genital herpes A60.00    Sarcoidosis (HCC) L82.2    Metabolic encephalopathy H77.27    Hyponatremia E87.1    Abscess of right arm L02.413    Abdominal pain, epigastric R10.13    Chest pain R07.9    Hypoxemia requiring supplemental oxygen R09.02, Z99.81    Hyperglycemia R73.9    Elevated troponin R74.8    CAP (community acquired pneumonia) J18.9    'Light-for-dates' infant with signs of fetal malnutrition P05.00    Pneumonia J18.9    Abnormal nuclear stress test R94.39    Cellulitis L03.90    Cellulitis and abscess of hand L03.119, L02.519    Cellulitis and abscess of foot L03.119, L02.619     6) Indwelling Catheters:    IV : present, Drains: not present, Renae: not present     7) Wound care consult placed. SUBJECTIVE      No complaints  Denies Headache, N/V, CP, SOB, ABD or Calf pain.       OBJECTIVE      Visit Vitals    /73    Pulse 84    Temp 98.4 °F (36.9 °C)    Resp 18    Ht 5' 5\" (1.651 m)    Wt 225 lb 8 oz (102.3 kg)    SpO2 96%    BMI 37.53 kg/m2       Intake/Output Summary (Last 24 hours) at 04/18/17 0657  Last data filed at 04/17/17 2100   Gross per 24 hour   Intake              500 ml   Output 510 ml   Net              -10 ml       Alert, Oriented x 3,no distress,     CHEST/ABDOMEN: Observation reveals: No audible wheezing from mouth. No accessory use of chest muscles during breathing. Non tender abdomen    Incision/Dressings:    Dressings are Clean,dry , intact   Incision: dressings covering the incision  not assessed)    Extremities:        No embolic phenomena to the toes or hands              No significant edema to the foot and or toes.                 Pulses in tact to the left and right foot             Lower extremities are warm and appear well perfused         DVT: No evidence of DVT seen on examination at this time      Left hand and left foot: dressings are in place: clean and dry    Lab/Data Reviewed:     CBC: No results found for: WBC, HGB, HGBEXT, HCT, HCTEXT, PLT, PLTEXT, HGBEXT, HCTEXT, PLTEXT    All Micro Results     Procedure Component Value Units Date/Time    CULTURE, FUNGUS [[id number]] Collected:  04/17/17 2016    Order Status:  Completed Specimen:  Abscess Updated:  04/17/17 2247     Special Requests: DORSAL FOOT, LF FOOT     FUNGUS SMEAR NO FUNGAL ELEMENTS SEEN        Culture result: PENDING    CULTURE, SURGICAL WOUND [[id number]] Collected:  04/17/17 2016    Order Status:  Completed Specimen:  Abscess Updated:  04/17/17 2246     Special Requests: DORSAL FOOT, LF FOOT     GRAM STAIN RARE  WBC'S         NO ORGANISMS SEEN        Culture result: PENDING    CULTURE, FUNGUS [[id number]] Collected:  04/17/17 2001    Order Status:  Completed Specimen:  Abscess Updated:  04/17/17 2246     Special Requests: DORSAL LF HAND ABSCESS     FUNGUS SMEAR NO FUNGAL ELEMENTS SEEN        Culture result: PENDING    CULTURE, SURGICAL WOUND [[id number]] Collected:  04/17/17 2001    Order Status:  Completed Specimen:  Abscess Updated:  04/17/17 2246     Special Requests: DORSAL LF HAND ABSCESS     GRAM STAIN FEW  WBC'S         RARE  GRAM POSITIVE COCCI  IN PAIRS        Culture result: PENDING CULTURE, ANAEROBIC [[id number]] Collected:  04/17/17 2016    Order Status:  Completed Updated:  04/17/17 2103    CULTURE, ANAEROBIC [[id number]] Collected:  04/17/17 2001    Order Status:  Completed Updated:  04/17/17 2103    CULTURE, BLOOD [[id number]] Collected:  04/12/17 1447    Order Status:  Completed Specimen:  Blood from Blood Updated:  04/17/17 0656     Special Requests: NO SPECIAL REQUESTS        Culture result: NO GROWTH 5 DAYS       CULTURE, BLOOD [[id number]] Collected:  04/12/17 2100    Order Status:  Completed Specimen:  Blood from Blood Updated:  04/17/17 0656     Special Requests: NO SPECIAL REQUESTS        Culture result: NO GROWTH 5 DAYS              Past Medical History:   Diagnosis Date    Anxiety     Asthma     Back injury 1986    jumped out of second story window    Behavioral change     Bilateral knee pain     Bipolar disorder ([facility] [address]     Chronic airway obstruction, not elsewhere classified     SOB, on paula O2 Recent admission with psychosis    Chronic back pain     Chronic diastolic heart failure (HCC)     Stable on diuretics    Chronic kidney disease     stage III    Congestive heart failure (HCC)     COPD (chronic obstructive pulmonary disease) ([facility] [address] 10/31/2012    Cramps, muscle, general     Depression     Diabetes mellitus     Difficulty speaking     Difficulty walking     Difficulty writing     DJD (degenerative joint disease) of knee     bilateral, mild    Edema     The edema involves both lower extremities. The episodes last for 1 week. The patient describes this as worsening. Symptoms are exacerbated by prolonged sitting. Symptoms are relieved by leg elevation and diuretics. NO CHANGE    Essential hypertension, benign     Better controlled    Falls frequently     Fatigue     Generalized stiffness     Headache     Heartburn     Hiatal hernia     History of hepatitis C     treated    Hoarseness of voice     Hypothyroidism     Lung disease     Memory difficulty  Morbid obesity (HCC)     Multiple joint pain     Nausea     Numbness and tingling     arms and legs, related to diabetic neuropathy    Osteoarthritis of left knee     Other and unspecified hyperlipidemia     LDL reportedly very high/started on crestor recently by diabetic doctor    Oxygen dependent     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     Sarcoidosis ([facility] [address]     Sickle cell trait ([facility] [address]     Sinusitis     SOB (shortness of breath)     STD (female)     herpes    Swelling of body region     legs and feet    Tinnitus     Venous insufficiency     Vertigo     Weakness generalized     Wears glasses     Weight gain      Past Surgical History:   Procedure Laterality Date    HX BACK SURGERY      HX  SECTION      HX TUBAL LIGATION       Allergies   Allergen Reactions    Moxifloxacin Rash    Pcn [Penicillins] Swelling    Sulfa (Sulfonamide Antibiotics) Swelling     Current Facility-Administered Medications   Medication Dose Route Frequency Provider Last Rate Last Dose    fluconazole (DIFLUCAN) tablet 200 mg  200 mg Oral DAILY Allyson Miranda MD   Stopped at 17 0900    insulin glargine (LANTUS) injection 45 Units  45 Units SubCUTAneous Q12H Allyson Miranda MD   45 Units at 17 2303    polyethylene glycol (MIRALAX) packet 17 g  17 g Oral DAILY Allyson Miranda MD   Stopped at 17 0900    senna-docusate (PERICOLACE) 8.6-50 mg per tablet 2 Tab  2 Tab Oral QHS Allyson Miranda MD   2 Tab at 17 2255    acetaminophen (TYLENOL) tablet 500 mg  500 mg Oral Q6H PRN Allyson Miranda MD        vancomycin (VANCOCIN) 1,750 mg in 0.9% sodium chloride 500 mL IVPB  1,750 mg IntraVENous Q18H Sony Carmona III,  mL/hr at 17 1508 1,750 mg at 17 1508    ARIPiprazole (ABILIFY) tablet 10 mg  10 mg Oral DAILY Sony Carmona III, MD   Stopped at 17 0900    aspirin delayed-release tablet 81 mg  81 mg Oral DAILY Sony Carmona III, MD   Stopped at 04/17/17 0900    divalproex  (DEPAKOTE) tablet 250 mg  250 mg Oral QHS Jackie Yoon III, MD   250 mg at 04/17/17 2255    eplerenone (INSPRA) tablet 25 mg  25 mg Oral DAILY Jackie Yoon III, MD   Stopped at 04/17/17 0900    ergocalciferol (ERGOCALCIFEROL) capsule 50,000 Units  50,000 Units Oral DAILY Jackie Yoon III, MD   Stopped at 04/17/17 0900    gabapentin (NEURONTIN) capsule 300 mg  300 mg Oral TID Jackie Yoon III, MD   300 mg at 87/32/61 1675    folic acid-vit G7-VWX Y92 (FOLTX) 2.5-25-2 mg tablet 1 Tab  1 Tab Oral DAILY Jackie Yoon III, MD   Stopped at 04/17/17 0900    levothyroxine (SYNTHROID) tablet 75 mcg  75 mcg Oral ACB Jackie Yoon III, MD   75 mcg at 04/17/17 2767    metOLazone (ZAROXOLYN) tablet 2.5 mg  2.5 mg Oral Q MON, WED & SUN Jackie Yoon III, MD   Stopped at 04/17/17 1700    potassium chloride (KLOR-CON) packet 20 mEq  20 mEq Oral DAILY Jackie Yoon III, MD   Stopped at 04/17/17 0900    predniSONE (DELTASONE) tablet 5 mg  5 mg Oral DAILY Jackie Yoon III, MD   Stopped at 04/17/17 0900    rosuvastatin (CRESTOR) tablet 5 mg  5 mg Oral QHS Jackie Yoon III, MD   5 mg at 04/17/17 2254    sertraline (ZOLOFT) tablet 50 mg  50 mg Oral DAILY Jackie Yoon III, MD   Stopped at 04/17/17 0900    traZODone (DESYREL) tablet 100 mg  100 mg Oral QHS Jackie Yoon III, MD   100 mg at 04/17/17 2255    0.9% sodium chloride infusion  75 mL/hr IntraVENous CONTINUOUS Jackie Yoon III, MD 75 mL/hr at 04/18/17 0148 75 mL/hr at 04/18/17 0148    HYDROcodone-acetaminophen (NORCO) 5-325 mg per tablet 1 Tab  1 Tab Oral Q4H PRN Jackie Yoon III, MD   1 Tab at 04/18/17 0147    naloxone [facility]) injection 0.4 mg  0.4 mg IntraVENous PRN Jackie Yoon III, MD        insulin lispro (HUMALOG) injection   SubCUTAneous AC&HS Jackie Yoon III, MD   Stopped at 04/17/17 0900    glucose chewable tablet 16 g  4 Tab Oral PRN Carlos Ribera MD        glucagon (GLUCAGEN) injection 1 mg  1 mg IntraMUSCular PRN Tina Pearson III, MD        dextrose (D50W) injection syrg 12.5-25 g  25-50 mL IntraVENous PRN Tina Pearson III, MD        meropenem [facility]) 1 g in 0.9% sodium chloride (MBP/ADV) 50 mL MBP  1 g IntraVENous Q8H Tina Pearson III,  mL/hr at 04/18/17 0354 1 g at 04/18/17 0354    morphine injection 1 mg  1 mg IntraVENous Q4H PRN Patricia Trinidad MD   1 mg at 04/18/17 0437    ondansetron (ZOFRAN) injection 4 mg  4 mg IntraVENous Q6H PRN Patricia Trinidad MD        tiotropium [facility]) inhalation capsule 18 mcg  1 Cap Inhalation DAILY Patricia Trinidad MD   18 mcg at 04/17/17 1045    albuterol-ipratropium (DUO-NEB) 2.5 MG-0.5 MG/3 ML  3 mL Nebulization Q6H PRN Patricia Trinidad MD   3 mL at 04/14/17 1456    umeclidinium-vilanterol (ANORO ELLIPTA) 62.5 mcg- 25 mcg/inhalation  1 Puff Inhalation DAILY Tina eParson III, MD   Stopped at 04/16/17 0900         Dmitriy Matias MD  4/18/2017  6:57 AM

## 2017-04-19 LAB
ANION GAP BLD CALC-SCNC: 4 MMOL/L (ref 3–18)
BUN SERPL-MCNC: 12 MG/DL (ref 7–18)
BUN/CREAT SERPL: 13 (ref 12–20)
CALCIUM SERPL-MCNC: 9.1 MG/DL (ref 8.5–10.1)
CHLORIDE SERPL-SCNC: 101 MMOL/L (ref 100–108)
CO2 SERPL-SCNC: 33 MMOL/L (ref 21–32)
CREAT SERPL-MCNC: 0.95 MG/DL (ref 0.6–1.3)
DATE LAST DOSE: NORMAL
GLUCOSE BLD STRIP.AUTO-MCNC: 178 MG/DL (ref 70–110)
GLUCOSE BLD STRIP.AUTO-MCNC: 181 MG/DL (ref 70–110)
GLUCOSE BLD STRIP.AUTO-MCNC: 208 MG/DL (ref 70–110)
GLUCOSE BLD STRIP.AUTO-MCNC: 259 MG/DL (ref 70–110)
GLUCOSE SERPL-MCNC: 188 MG/DL (ref 74–99)
POTASSIUM SERPL-SCNC: 4.5 MMOL/L (ref 3.5–5.5)
REPORTED DOSE,DOSE: NORMAL UNITS
REPORTED DOSE/TIME,TMG: 900
SODIUM SERPL-SCNC: 138 MMOL/L (ref 136–145)
VANCOMYCIN TROUGH SERPL-MCNC: 11.3 UG/ML (ref 10–20)

## 2017-04-19 PROCEDURE — 74011250637 HC RX REV CODE- 250/637: Performed by: HOSPITALIST

## 2017-04-19 PROCEDURE — 80048 BASIC METABOLIC PNL TOTAL CA: CPT | Performed by: HOSPITALIST

## 2017-04-19 PROCEDURE — 36415 COLL VENOUS BLD VENIPUNCTURE: CPT | Performed by: HOSPITALIST

## 2017-04-19 PROCEDURE — 80202 ASSAY OF VANCOMYCIN: CPT | Performed by: HOSPITALIST

## 2017-04-19 PROCEDURE — 77010033678 HC OXYGEN DAILY: Performed by: HOSPITALIST

## 2017-04-19 PROCEDURE — 74011250636 HC RX REV CODE- 250/636: Performed by: INTERNAL MEDICINE

## 2017-04-19 PROCEDURE — 74011636637 HC RX REV CODE- 636/637: Performed by: INTERNAL MEDICINE

## 2017-04-19 PROCEDURE — 74011636637 HC RX REV CODE- 636/637: Performed by: HOSPITALIST

## 2017-04-19 PROCEDURE — 94640 AIRWAY INHALATION TREATMENT: CPT

## 2017-04-19 PROCEDURE — 65270000029 HC RM PRIVATE

## 2017-04-19 PROCEDURE — 74011250636 HC RX REV CODE- 250/636: Performed by: HOSPITALIST

## 2017-04-19 PROCEDURE — 82962 GLUCOSE BLOOD TEST: CPT

## 2017-04-19 PROCEDURE — 74011000250 HC RX REV CODE- 250: Performed by: HOSPITALIST

## 2017-04-19 RX ORDER — INSULIN LISPRO 100 [IU]/ML
0.05 INJECTION, SOLUTION INTRAVENOUS; SUBCUTANEOUS
Status: DISCONTINUED | OUTPATIENT
Start: 2017-04-19 | End: 2017-04-20 | Stop reason: HOSPADM

## 2017-04-19 RX ADMIN — INSULIN GLARGINE 45 UNITS: 100 INJECTION, SOLUTION SUBCUTANEOUS at 08:00

## 2017-04-19 RX ADMIN — SODIUM CHLORIDE 125 ML/HR: 900 INJECTION, SOLUTION INTRAVENOUS at 08:48

## 2017-04-19 RX ADMIN — INSULIN LISPRO 3 UNITS: 100 INJECTION, SOLUTION INTRAVENOUS; SUBCUTANEOUS at 21:49

## 2017-04-19 RX ADMIN — POTASSIUM CHLORIDE 20 MEQ: 1.5 POWDER, FOR SOLUTION ORAL at 08:57

## 2017-04-19 RX ADMIN — IPRATROPIUM BROMIDE AND ALBUTEROL SULFATE 3 ML: .5; 3 SOLUTION RESPIRATORY (INHALATION) at 00:51

## 2017-04-19 RX ADMIN — LEVOTHYROXINE SODIUM 75 MCG: 75 TABLET ORAL at 09:18

## 2017-04-19 RX ADMIN — INSULIN LISPRO 5 UNITS: 100 INJECTION, SOLUTION INTRAVENOUS; SUBCUTANEOUS at 17:00

## 2017-04-19 RX ADMIN — DIVALPROEX SODIUM 250 MG: 250 TABLET, DELAYED RELEASE ORAL at 21:07

## 2017-04-19 RX ADMIN — ERGOCALCIFEROL 50000 UNITS: 1.25 CAPSULE ORAL at 09:17

## 2017-04-19 RX ADMIN — GABAPENTIN 300 MG: 300 CAPSULE ORAL at 09:17

## 2017-04-19 RX ADMIN — INSULIN LISPRO 6 UNITS: 100 INJECTION, SOLUTION INTRAVENOUS; SUBCUTANEOUS at 12:08

## 2017-04-19 RX ADMIN — HYDROCODONE BITARTRATE AND ACETAMINOPHEN 1 TABLET: 5; 325 TABLET ORAL at 06:03

## 2017-04-19 RX ADMIN — HYDROCODONE BITARTRATE AND ACETAMINOPHEN 1 TABLET: 5; 325 TABLET ORAL at 12:52

## 2017-04-19 RX ADMIN — POLYETHYLENE GLYCOL 3350 17 G: 17 POWDER, FOR SOLUTION ORAL at 08:57

## 2017-04-19 RX ADMIN — GABAPENTIN 300 MG: 300 CAPSULE ORAL at 21:07

## 2017-04-19 RX ADMIN — INSULIN GLARGINE 45 UNITS: 100 INJECTION, SOLUTION SUBCUTANEOUS at 21:49

## 2017-04-19 RX ADMIN — Medication 1 MG: at 16:53

## 2017-04-19 RX ADMIN — INSULIN LISPRO 5 UNITS: 100 INJECTION, SOLUTION INTRAVENOUS; SUBCUTANEOUS at 14:00

## 2017-04-19 RX ADMIN — VANCOMYCIN HYDROCHLORIDE 1750 MG: 10 INJECTION, POWDER, LYOPHILIZED, FOR SOLUTION INTRAVENOUS at 21:06

## 2017-04-19 RX ADMIN — VANCOMYCIN HYDROCHLORIDE 1750 MG: 10 INJECTION, POWDER, LYOPHILIZED, FOR SOLUTION INTRAVENOUS at 04:01

## 2017-04-19 RX ADMIN — TIOTROPIUM BROMIDE 18 MCG: 18 CAPSULE ORAL; RESPIRATORY (INHALATION) at 09:12

## 2017-04-19 RX ADMIN — Medication 1 TABLET: at 09:18

## 2017-04-19 RX ADMIN — ASPIRIN 81 MG: 81 TABLET, COATED ORAL at 09:17

## 2017-04-19 RX ADMIN — DIPHENHYDRAMINE HYDROCHLORIDE 25 MG: 50 INJECTION, SOLUTION INTRAMUSCULAR; INTRAVENOUS at 12:00

## 2017-04-19 RX ADMIN — INSULIN LISPRO 3 UNITS: 100 INJECTION, SOLUTION INTRAVENOUS; SUBCUTANEOUS at 16:30

## 2017-04-19 RX ADMIN — HYDROCODONE BITARTRATE AND ACETAMINOPHEN 1 TABLET: 5; 325 TABLET ORAL at 21:07

## 2017-04-19 RX ADMIN — METOLAZONE 2.5 MG: 2.5 TABLET ORAL at 16:29

## 2017-04-19 RX ADMIN — EPLERENONE 25 MG: 25 TABLET, FILM COATED ORAL at 09:19

## 2017-04-19 RX ADMIN — ARIPIPRAZOLE 10 MG: 10 TABLET ORAL at 09:17

## 2017-04-19 RX ADMIN — Medication 1 MG: at 09:20

## 2017-04-19 RX ADMIN — SODIUM CHLORIDE 125 ML/HR: 900 INJECTION, SOLUTION INTRAVENOUS at 18:57

## 2017-04-19 RX ADMIN — IPRATROPIUM BROMIDE AND ALBUTEROL SULFATE 3 ML: .5; 3 SOLUTION RESPIRATORY (INHALATION) at 09:15

## 2017-04-19 RX ADMIN — GABAPENTIN 300 MG: 300 CAPSULE ORAL at 16:29

## 2017-04-19 RX ADMIN — PREDNISONE 5 MG: 10 TABLET ORAL at 09:18

## 2017-04-19 RX ADMIN — SERTRALINE HYDROCHLORIDE 50 MG: 50 TABLET ORAL at 09:19

## 2017-04-19 RX ADMIN — STANDARDIZED SENNA CONCENTRATE AND DOCUSATE SODIUM 2 TABLET: 8.6; 5 TABLET, FILM COATED ORAL at 21:07

## 2017-04-19 RX ADMIN — INSULIN LISPRO 9 UNITS: 100 INJECTION, SOLUTION INTRAVENOUS; SUBCUTANEOUS at 08:40

## 2017-04-19 RX ADMIN — TRAZODONE HYDROCHLORIDE 100 MG: 100 TABLET ORAL at 21:07

## 2017-04-19 RX ADMIN — DIPHENHYDRAMINE HYDROCHLORIDE 25 MG: 50 INJECTION, SOLUTION INTRAMUSCULAR; INTRAVENOUS at 19:40

## 2017-04-19 RX ADMIN — ROSUVASTATIN CALCIUM 5 MG: 5 TABLET ORAL at 21:07

## 2017-04-19 RX ADMIN — Medication 1 MG: at 00:51

## 2017-04-19 NOTE — PROGRESS NOTES
Infectious Disease progress Note    Requested by: Dr. Elma Bauman    Reason: left hand and left foot abscess    Current abx Prior abx   Meropenem, vancomycin since 4/12/17      Lines:       Assessment :    62-year-old black female with h/o IVDA ( last used IV heroin and cocaine 2  days prior to admission) admitted to [facility] on 4/12/17 with increasing left hand and foot pain. Clinical picture consistent with left foot and left hand cellulitis, abscess. Exact microbial etiology of infection not clear. Patient has been appropriately managed with I&D on 4/17. Prior antibiotics will likely interfere with current cultures. Cultures: alpha hemolytic strep, cons    Antibiotic de escalation difficult since life threatening allergy to pcn (anaphylaxis 30 years ago). Non life threatening allergy to moxifloxacin. She has tolerated levofloxacin in the past. Patient agrees to try levofloxacin. Benefits and risks of this approach including rash, hives, swelling of throat, anaphylaxis, death explained to patient in details. She verbalized her understanding and wishes to proceed. Nursing staff was advised to monitor closely for allergy. Give antihistaminics & call me if any allergic reaction noted. Recommendations:    1. Discontinue levofloxacin  2. Continue vancomycin  3. F/u intra op cultures  4. Will switch to po abx in am based on cultures       Above plan was discussed in details with patient. Will d/w dr Elma Bauman. Please call me if any further questions or concerns. Will continue to participate in the care of this patient. subjective:    Patient states that her pain is better. Patient denies headaches, visual disturbances, sore throat, runny nose, earaches, cp, sob, chills, cough, abdominal pain, diarrhea, burning micturition, or weakness in extremities. she denies back pain/flank pain. No h/o recent travel.  No known h/o MRSA colonization or infection in the past.        Home Medication List    Details   predniSONE (DELTASONE) 5 mg tablet TAKE ONE TABLET EVERY DAY  Qty: 30 Tab, Refills: 0       Details   umeclidinium-vilanterol (ANORO ELLIPTA) 62.5-25 mcg/actuation inhaler Take 1 Puff by inhalation daily. Qty: 1 Inhaler, Refills: 5      VENTOLIN HFA 90 mcg/actuation inhaler INHALE 2 PUFFS EVERY 4 HOURS AS NEEDED  Qty: 1 Inhaler, Refills: 4      divalproex DR (DEPAKOTE) 250 mg tablet Take 250 mg by mouth nightly. furosemide (LASIX) 20 mg tablet Take 1 Tab by mouth daily. Qty: 30 Tab, Refills: 0      metOLazone (ZAROXOLYN) 5 mg tablet Take 0.5 Tabs by mouth Every Mon, Wed & Sun.  Qty: 30 Tab, Refills: 6      potassium chloride (KLOR-CON) 20 mEq packet Take 1 Packet by mouth daily. Qty: 30 Each, Refills: 0      clopidogrel (PLAVIX) 75 mg tablet Take 1 Tab by mouth daily. Qty: 30 Tab, Refills: 0      insulin glargine (LANTUS) 100 unit/mL injection 60 units subcutaneously every night  Qty: 1 Vial, Refills: 0      insulin syringe,safetyneedle 1 mL 30 gauge x 5/16\" syrg As directed  Qty: 50 Each, Refills: 0      ARIPiprazole (ABILIFY) 5 mg tablet Take 10 mg by mouth daily. Comments: take 2 tabs each morning      albuterol (PROVENTIL VENTOLIN) 2.5 mg /3 mL (0.083 %) nebulizer solution USE 1 NEB EVERY 4 HOURS FOR WHEEZING AND SHORTNESS OF BREATH  Indications: CHRONIC OBSTRUCTIVE PULMONARY DISEASE  Qty: 2 Package, Refills: 3    Associated Diagnoses: Chronic obstructive pulmonary disease, unspecified COPD type (HCC)      eplerenone (INSPRA) 25 mg tablet Take  by mouth daily. gabapentin (NEURONTIN) 300 mg capsule Take 300 mg by mouth three (3) times daily. acetaminophen (TYLENOL) 325 mg tablet Take 325 mg by mouth every six (6) hours as needed for Pain. traZODone (DESYREL) 100 mg tablet Take 100 mg by mouth nightly. rosuvastatin (CRESTOR) 5 mg tablet Take 1 Tab by mouth nightly. Qty: 30 Tab, Refills: 6      l-methylfolate-vit b12-vit b6 (METANX) 2.8-2-25 mg Tab Take  by mouth.       aspirin 81 mg tablet Take 81 mg by mouth daily. Nebulizer Accessories Kit Use with nebulizer machine  Qty: 1 Kit, Refills: prn    Associated Diagnoses: COPD (chronic obstructive pulmonary disease) ([facility])      insulin CONCENTRATED regular (U-500 CONCENTRATED) 500 unit/mL Soln 20 Units by SubCUTAneous route. With breakfast, lunch, and dinner      levothyroxine (SYNTHROID) 100 mcg tablet Take  by mouth Daily (before breakfast). sertraline (ZOLOFT) 100 mg tablet Take 50 mg by mouth daily. Oxygen-Air Delivery Systems by Nasal route continuous. 2 LNC      ergocalciferol (VITAMIN D) 50,000 unit capsule Take 50,000 Units by mouth daily.              Current Facility-Administered Medications   Medication Dose Route Frequency    levoFLOXacin (LEVAQUIN) 750 mg in D5W IVPB  750 mg IntraVENous Q24H    diphenhydrAMINE (BENADRYL) injection 25 mg  25 mg IntraVENous Q4H PRN    insulin glargine (LANTUS) injection 45 Units  45 Units SubCUTAneous Q12H    polyethylene glycol (MIRALAX) packet 17 g  17 g Oral DAILY    senna-docusate (PERICOLACE) 8.6-50 mg per tablet 2 Tab  2 Tab Oral QHS    acetaminophen (TYLENOL) tablet 500 mg  500 mg Oral Q6H PRN    vancomycin (VANCOCIN) 1,750 mg in 0.9% sodium chloride 500 mL IVPB  1,750 mg IntraVENous Q18H    ARIPiprazole (ABILIFY) tablet 10 mg  10 mg Oral DAILY    aspirin delayed-release tablet 81 mg  81 mg Oral DAILY    divalproex DR (DEPAKOTE) tablet 250 mg  250 mg Oral QHS    eplerenone (INSPRA) tablet 25 mg  25 mg Oral DAILY    ergocalciferol (ERGOCALCIFEROL) capsule 50,000 Units  50,000 Units Oral DAILY    gabapentin (NEURONTIN) capsule 300 mg  300 mg Oral TID    folic acid-vit T4-BRG V05 (FOLTX) 2.5-25-2 mg tablet 1 Tab  1 Tab Oral DAILY    levothyroxine (SYNTHROID) tablet 75 mcg  75 mcg Oral ACB    metOLazone (ZAROXOLYN) tablet 2.5 mg  2.5 mg Oral Q MON, WED & SUN    potassium chloride (KLOR-CON) packet 20 mEq  20 mEq Oral DAILY    predniSONE (DELTASONE) tablet 5 mg  5 mg Oral DAILY  rosuvastatin (CRESTOR) tablet 5 mg  5 mg Oral QHS    sertraline (ZOLOFT) tablet 50 mg  50 mg Oral DAILY    traZODone (DESYREL) tablet 100 mg  100 mg Oral QHS    0.9% sodium chloride infusion  125 mL/hr IntraVENous CONTINUOUS    HYDROcodone-acetaminophen (NORCO) 5-325 mg per tablet 1 Tab  1 Tab Oral Q4H PRN    naloxone (NARCAN) injection 0.4 mg  0.4 mg IntraVENous PRN    insulin lispro (HUMALOG) injection   SubCUTAneous AC&HS    glucose chewable tablet 16 g  4 Tab Oral PRN    glucagon (GLUCAGEN) injection 1 mg  1 mg IntraMUSCular PRN    dextrose (D50W) injection syrg 12.5-25 g  25-50 mL IntraVENous PRN    morphine injection 1 mg  1 mg IntraVENous Q4H PRN    ondansetron (ZOFRAN) injection 4 mg  4 mg IntraVENous Q6H PRN    tiotropium (SPIRIVA) inhalation capsule 18 mcg  1 Cap Inhalation DAILY    albuterol-ipratropium (DUO-NEB) 2.5 MG-0.5 MG/3 ML  3 mL Nebulization Q6H PRN    umeclidinium-vilanterol (ANORO ELLIPTA) 62.5 mcg- 25 mcg/inhalation  1 Puff Inhalation DAILY       Allergies: Moxifloxacin; Pcn [penicillins]; and Sulfa (sulfonamide antibiotics)    Family History   Problem Relation Age of Onset    Seizures Other     Diabetes Mother     Hypertension Mother     Heart Disease Mother     Cancer Mother     Diabetes Father     Stroke Father     Heart Disease Father     Headache Sister     Depression Neg Hx     Suicide Neg Hx     Psychotic Disorder Neg Hx     Substance Abuse Neg Hx     Dementia Neg Hx      Social History     Social History    Marital status: SINGLE     Spouse name: N/A    Number of children: N/A    Years of education: N/A     Occupational History    Not employed Not Employed     Social History Main Topics    Smoking status: Former Smoker     Packs/day: 0.50     Years: 30.00     Types: Cigarettes     Start date: 12/2/1969     Quit date: 10/31/2016    Smokeless tobacco: Never Used      Comment: Per pt.  10 a day     Alcohol use No    Drug use: No      Comment: +Cocaine Screen     Sexual activity: Not on file      Comment:      Other Topics Concern    Not on file     Social History Narrative    Lives with 15year old son. History   Smoking Status    Former Smoker    Packs/day: 0.50    Years: 30.00    Types: Cigarettes    Start date: 1969   [facility] Quit date: 10/31/2016   Smokeless Tobacco    Never Used     Comment: Per pt. 10 a day         Temp (24hrs), Av °F (36.7 °C), Min:97 °F (36.1 °C), Max:98.8 °F (37.1 °C)    Visit Vitals    BP (!) 135/91 (BP 1 Location: Right arm, BP Patient Position: At rest)    Pulse 66    Temp 97 °F (36.1 °C)    Resp 18    Ht 5' 5\" (1.651 m)    Wt 102.3 kg (225 lb 8 oz)    LMP 2012    SpO2 99%    BMI 37.53 kg/m2       ROS: 12 point ROS obtained in details. Pertinent positives as mentioned in HPI,   otherwise negative    Physical Exam:    General: Well developed, well nourished female laying on the bed AAOx3 in no acute distress. General:   awake alert and oriented   HEENT:  Normocephalic, atraumatic, PERRL, EOMI, no scleral icterus or pallor; no conjunctival hemmohage;  nasal and oral mucous are moist and without evidence of lesions. No thrush. Neck supple, no bruits. Lymph Nodes:   no cervical, axillary or inguinal adenopathy   Lungs:   non-labored, bilaterally clear to auscultation- no crackles wheezes rales or rhonchi   Heart:  RRR, s1 and s2; no murmurs rubs or gallops, no edema, + pedal pulses   Abdomen:  soft, non-distended, active bowel sounds, no hepatomegaly, no splenomegaly. Non-tender   Genitourinary:  deferred   Extremities:   no clubbing, cyanosis; no joint effusions or swelling; Full ROM of all large joints to the upper and lower extremities; muscle mass appropriate for age, left hand and left foot dressing not removed   Neurologic:  No gross focal sensory abnormalities; 5/5 muscle strength to upper and lower extremities. Speech appropriate.  Cranial nerves intact Skin:  No rash or ulcers noted   Back:  no spinal or paraspinal muscle tenderness or rigidity, no CVA tenderness     Psychiatric:  No suicidal or homicidal ideations, appropriate mood and affect         Labs: Results:   Chemistry Recent Labs      04/19/17   0323  04/18/17   0300  04/17/17   0306   GLU  188*  164*  185*   NA  138  140  141   K  4.5  4.0  4.6   CL  101  102  102   CO2  33*  33*  30   BUN  12  10  9   CREA  0.95  1.34*  0.96   CA  9.1  8.6  8.9   AGAP  4  5  9   BUCR  13  7*  9*      CBC w/Diff Recent Labs      04/17/17   0306   WBC  7.5   RBC  4.52   HGB  12.4   HCT  38.1   PLT  151   GRANS  62   LYMPH  29   EOS  2      Microbiology Recent Labs      04/17/17 2016 04/17/17 2001   CULT  FEW  POSSIBLE  STAPHYLOCOCCUS SPECIES, COAGULASE NEGATIVE  *  FEW  POSSIBLE 2ND  STAPHYLOCOCCUS SPECIES, COAGULASE NEGATIVE  *  RARE  POSSIBLE  STREPTOCOCCI, ALPHA HEMOLYTIC  *  NO ANAEROBES ISOLATED 2 DAYS  PENDING  FEW  POSSIBLE  STAPHYLOCOCCUS SPECIES, COAGULASE NEGATIVE  *  RARE  POSSIBLE  STREPTOCOCCI, ALPHA HEMOLYTIC  *  NO ANAEROBES ISOLATED 2 DAYS  PENDING          RADIOLOGY:    All available imaging studies/reports in [facility] for this admission were reviewed    Dr. Christiana Hickey, Infectious Disease Specialist  [phone]  April 19, 2017  1:57 PM

## 2017-04-19 NOTE — PROGRESS NOTES
Bedside and Verbal shift change report given to Holly Fernandez [address] (oncoming nurse) by Maral Mena LPN (offgoing nurse). Report included the following information SBAR, Kardex, MAR and Recent Results. SITUATION:    Code Status: Full Code   Reason for Admission: Abnormal feces [R19.5]   Encounter for colonoscopy due to history of adenomatous colonic polyps [Z12.11, Z86.010]   Cigarette smoker [F05.030]    [facility] day: 7   Problem List:       Hospital Problems  Date Reviewed: 4/17/2017          Codes Class Noted POA    Cellulitis and abscess of hand ICD-10-CM: L03.119, L02.519  ICD-9-CM: 682.4  4/13/2017 Unknown        Cellulitis and abscess of foot ICD-10-CM: L03.119, L02.619  ICD-9-CM: 682.7  4/13/2017 Unknown        Cellulitis ICD-10-CM: L03.90  ICD-9-CM: 682.9  4/12/2017 Unknown              BACKGROUND:    Past Medical History:   Past Medical History:   Diagnosis Date    Anxiety     Asthma     Back injury 1986    jumped out of second story window    Behavioral change     Bilateral knee pain     Bipolar disorder ([facility] [address]     Chronic airway obstruction, not elsewhere classified     SOB, on paula O2 Recent admission with psychosis    Chronic back pain     Chronic diastolic heart failure (HCC)     Stable on diuretics    Chronic kidney disease     stage III    Congestive heart failure (HCC)     COPD (chronic obstructive pulmonary disease) ([facility] [address] 10/31/2012    Cramps, muscle, general     Depression     Diabetes mellitus     Difficulty speaking     Difficulty walking     Difficulty writing     DJD (degenerative joint disease) of knee     bilateral, mild    Edema     The edema involves both lower extremities. The episodes last for 1 week. The patient describes this as worsening. Symptoms are exacerbated by prolonged sitting. Symptoms are relieved by leg elevation and diuretics. NO CHANGE    Essential hypertension, benign     Better controlled    Falls frequently     Fatigue     Generalized stiffness  Headache     Heartburn     Hiatal hernia     History of hepatitis C     treated    Hoarseness of voice     Hypothyroidism     Lung disease     Memory difficulty     Morbid obesity (HCC)     Multiple joint pain     Nausea     Numbness and tingling     arms and legs, related to diabetic neuropathy    Osteoarthritis of left knee     Other and unspecified hyperlipidemia     LDL reportedly very high/started on crestor recently by diabetic doctor    Oxygen dependent     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     [facility] [address]     Sickle cell trait ([facility] [address]     Sinusitis     SOB (shortness of breath)     STD (female)     herpes    Swelling of body region     legs and feet    Tinnitus     Venous insufficiency     Vertigo     Weakness generalized     Wears glasses     Weight gain          Patient taking anticoagulants no     ASSESSMENT:    Changes in Assessment Throughout Shift: None     Patient has Central Line: no    Patient has Renae Cath: no      Last Vitals:     Vitals:    04/19/17 0803 04/19/17 0919 04/19/17 1141 04/19/17 1558   BP: (!) 135/91  129/77 137/86   Pulse: 66  73 60   Resp: 18  18 18   Temp: 97 °F (36.1 °C)  97.1 °F (36.2 °C) 98.1 °F (36.7 °C)   SpO2: 99% 99% 97% 100%   Weight:       Height:       LMP: 11/25/2012        IV and DRAINS (will only show if present)   [REMOVED] Peripheral IV 04/15/17 Right; Outer Forearm-Site Assessment: Clean, dry, & intact  [REMOVED] Peripheral IV 04/17/17 Right Arm-Site Assessment: Clean, dry, & intact  [REMOVED] Peripheral IV 04/17/17 Left Antecubital-Site Assessment: Clean, dry, & intact  [REMOVED] Peripheral IV 04/12/17 Left Forearm-Site Assessment: Clean, dry, & intact  Peripheral IV 04/19/17 Right Forearm-Site Assessment: Clean, dry, & intact     WOUND (if present)   Wound Type: Left hand /left foot   Dressing present /yes   Wound Concerns/Notes:  none     PAIN    Pain Assessment    Pain Intensity 1: 7 (04/19/17 6193)    Pain Location 1: Hand    Pain Intervention(s) 1: Medication (see MAR)    Patient Stated Pain Goal: 0  o Interventions for Pain:  Morphine IV  o Intervention effective: yes  o Time of last intervention: 1653   o Reassessment Completed: yes      Last 3 Weights:  Last 3 Recorded Weights in this Encounter    04/14/17 2340 04/16/17 0033 04/17/17 0445   Weight: 98.3 kg (216 lb 11.4 oz) 98.4 kg (217 lb) 102.3 kg (225 lb 8 oz)     Weight change:      INTAKE/OUPUT    Current Shift:      Last three shifts: 04/18 0701 - 04/19 1900  In: 1160 [P.O.:1160]  Out: 2000 [Urine:2000]     LAB RESULTS     Recent Labs      04/17/17   0306   WBC  7.5   HGB  12.4   HCT  38.1   PLT  151        Recent Labs      04/19/17   0323  04/18/17   0300  04/17/17   0306   NA  138  140  141   K  4.5  4.0  4.6   GLU  188*  164*  185*   BUN  12  10  9   CREA  0.95  1.34*  0.96   CA  9.1  8.6  8.9   MG   --   1.6  1.4*       RECOMMENDATIONS AND DISCHARGE PLANNING     1. Pending tests/procedures/ Plan of Care or Other Needs: None     2. Discharge plan for patient and Needs/Barriers: Home    3. Estimated Discharge Date: 4/19/2017 Posted on Whiteboard in [facility]: yes      4. The patient's care plan was reviewed with the oncoming nurse. \"HEALS\" SAFETY CHECK      Fall Risk    Total Score: 3    Safety Measures: Safety Measures: Bed/Chair alarm on, Bed in low position, Call light within reach, Fall prevention (comment), Side rails X 3    A safety check occurred in the patient's room between off going nurse and oncoming nurse listed above.     The safety check included the below items  Area Items   H  High Alert Medications - Verify all high alert medication drips (heparin, PCA, etc.)   E  Equipment - Suction is set up for ALL patients (with yanker)  - Red plugs utilized for all equipment (IV pumps, etc.)  - WOWs wiped down at end of shift.  - Room stocked with oxygen, suction, and other unit-specific supplies   A  Alarms - Bed alarm is set for fall risk patients  - Ensure chair alarm is in place and activated if patient is up in a chair   L  Lines - Check IV for any infiltration  - Renae bag is empty if patient has a Renae   - Tubing and IV bags are labeled   S  Safety   - Room is clean, patient is clean, and equipment is clean. - Hallways are clear from equipment besides carts. - Fall bracelet on for fall risk patients  - Ensure room is clear and free of clutter  - Suction is set up for ALL patients (with chantellker)  - Hallways are clear from equipment besides carts.    - Isolation precautions followed, supplies available outside room, sign posted     Mindy Eldridge LPN

## 2017-04-19 NOTE — PROGRESS NOTES
[facility] Hospitalist Group  Progress Note    Patient: Bharati Sinha Age: 62 y.o. : 1955 MR#: [id number] SSN: [id number]  Date/Time: 2017     Subjective:     Review of systems  General: No fevers or chills. Cardiovascular: No chest pain or pressure. No palpitations. Pulmonary: chronic wheezing // mild SOB , no Distress    Gastrointestinal: No abdominal pain, nausea, vomiting or diarrhea. Genitourinary: No urinary frequency, urgency, hesitancy or dysuria. Musculoskeletal: No joint or muscle pain, no back pain, no recent trauma. Neurologic: No headache, numbness, tingling or weakness.      Assessment/Plan:     Left Hand abscess and cellulitis / self injecting IVDA   - S/p I&D by ortho   - d/w ID   - Antibiotics recommendations reviewed and are ordered   - Follow Cultures       Left Foot Cellulitis   - S/P irrigation and debridement   - Local wound care     DM2  - on Lantus , Lispro , continue SSI - blood glucose are still high - meal time insulin ordered   - d/w diabetic teaching   - Wide fluctuations - 200 s to 60s - monitor blood glucose with change in insulin    - patient is eating well and no plan for procedure and or NPO   - Monitor blood glucose     Early renal insufficiency   - Cr improved with IVF   - continue to monitor     Hypothyroid   - Continue replacement    COPD acute   - patient appears comfortable at rest   - Chronic wheezing   - Mild SOB now and then per patient is not new   - On BD, Steroids - low dose , and she was not on steroids at home i will discontinue steroids now , this will help with good glucose control in presence of infection      Morbid obesity   - weight reduction d/w patient       Case discussed with:  [x]Patient  []Family  [x]Nursing  []Case Management  DVT Prophylaxis:  [x]Lovenox  []Hep SQ  []SCDs  []Coumadin   []On Heparin gtt    Objective:   VS:   Visit Vitals    /77 (BP 1 Location: Right arm, BP Patient Position: At rest)  Pulse 73    Temp 97.1 °F (36.2 °C)    Resp 18    Ht 5' 5\" (1.651 m)    Wt 102.3 kg (225 lb 8 oz)    LMP 2012    SpO2 97%    BMI 37.53 kg/m2      Tmax/24hrs: Temp (24hrs), Av.6 °F (36.4 °C), Min:97 °F (36.1 °C), Max:98.2 °F (36.8 °C)  IOBRIEF    Intake/Output Summary (Last 24 hours) at 17 1429  Last data filed at 17 1321   Gross per 24 hour   Intake              680 ml   Output             1200 ml   Net             -520 ml       General:  Alert, cooperative, no acute distress    HEENT:  NC, Atraumatic. PERRLA, anicteric sclerae. Pulmonary:  CTA Bilaterally. Bilateral scattered wheezing   Cardiovascular: Regular rate and Rhythm. GI:  Soft, Non distended, Non tender. + Bowel sounds. Extremities:  No edema, cyanosis, clubbing. No calf tenderness. Left hand - bandaged , left foot bandaged and dressed    Psych:  Not anxious or agitated. Neurologic: Grossly - Motor and Sensory functions are intact .  No Anxiety , calm , no Agitation         Medications:   Current Facility-Administered Medications   Medication Dose Route Frequency    insulin lispro (HUMALOG) injection 5 Units  0.05 Units/kg SubCUTAneous TID WITH MEALS    diphenhydrAMINE (BENADRYL) injection 25 mg  25 mg IntraVENous Q4H PRN    insulin glargine (LANTUS) injection 45 Units  45 Units SubCUTAneous Q12H    polyethylene glycol (MIRALAX) packet 17 g  17 g Oral DAILY    senna-docusate (PERICOLACE) 8.6-50 mg per tablet 2 Tab  2 Tab Oral QHS    acetaminophen (TYLENOL) tablet 500 mg  500 mg Oral Q6H PRN    vancomycin (VANCOCIN) 1,750 mg in 0.9% sodium chloride 500 mL IVPB  1,750 mg IntraVENous Q18H    ARIPiprazole (ABILIFY) tablet 10 mg  10 mg Oral DAILY    aspirin delayed-release tablet 81 mg  81 mg Oral DAILY    divalproex DR (DEPAKOTE) tablet 250 mg  250 mg Oral QHS    eplerenone (INSPRA) tablet 25 mg  25 mg Oral DAILY    ergocalciferol (ERGOCALCIFEROL) capsule 50,000 Units  50,000 Units Oral DAILY    gabapentin (NEURONTIN) capsule 300 mg  300 mg Oral TID    folic acid-vit Y0-VCT D48 (FOLTX) 2.5-25-2 mg tablet 1 Tab  1 Tab Oral DAILY    levothyroxine (SYNTHROID) tablet 75 mcg  75 mcg Oral ACB    metOLazone (ZAROXOLYN) tablet 2.5 mg  2.5 mg Oral Q MON, WED & SUN    potassium chloride (KLOR-CON) packet 20 mEq  20 mEq Oral DAILY    predniSONE (DELTASONE) tablet 5 mg  5 mg Oral DAILY    rosuvastatin (CRESTOR) tablet 5 mg  5 mg Oral QHS    sertraline (ZOLOFT) tablet 50 mg  50 mg Oral DAILY    traZODone (DESYREL) tablet 100 mg  100 mg Oral QHS    0.9% sodium chloride infusion  125 mL/hr IntraVENous CONTINUOUS    HYDROcodone-acetaminophen (NORCO) 5-325 mg per tablet 1 Tab  1 Tab Oral Q4H PRN    naloxone (NARCAN) injection 0.4 mg  0.4 mg IntraVENous PRN    insulin lispro (HUMALOG) injection   SubCUTAneous AC&HS    glucose chewable tablet 16 g  4 Tab Oral PRN    glucagon (GLUCAGEN) injection 1 mg  1 mg IntraMUSCular PRN    dextrose (D50W) injection syrg 12.5-25 g  25-50 mL IntraVENous PRN    morphine injection 1 mg  1 mg IntraVENous Q4H PRN    ondansetron (ZOFRAN) injection 4 mg  4 mg IntraVENous Q6H PRN    tiotropium (SPIRIVA) inhalation capsule 18 mcg  1 Cap Inhalation DAILY    albuterol-ipratropium (DUO-NEB) 2.5 MG-0.5 MG/3 ML  3 mL Nebulization Q6H PRN    umeclidinium-vilanterol (ANORO ELLIPTA) 62.5 mcg- 25 mcg/inhalation  1 Puff Inhalation DAILY       Labs:    Recent Results (from the past 24 hour(s))   GLUCOSE, POC    Collection Time: 04/18/17  5:34 PM   Result Value Ref Range    Glucose (POC) 313 (H) 70 - 110 mg/dL   GLUCOSE, POC    Collection Time: 04/18/17 10:38 PM   Result Value Ref Range    Glucose (POC) 209 (H) 70 - 582 mg/dL   METABOLIC PANEL, BASIC    Collection Time: 04/19/17  3:23 AM   Result Value Ref Range    Sodium 138 136 - 145 mmol/L    Potassium 4.5 3.5 - 5.5 mmol/L    Chloride 101 100 - 108 mmol/L    CO2 33 (H) 21 - 32 mmol/L    Anion gap 4 3.0 - 18 mmol/L    Glucose 188 (H) 74 - 99 mg/dL BUN 12 7.0 - 18 MG/DL    Creatinine 0.95 0.6 - 1.3 MG/DL    BUN/Creatinine ratio 13 12 - 20      GFR est AA >60 >60 ml/min/1.73m2    GFR est non-AA >60 >60 ml/min/1.73m2    Calcium 9.1 8.5 - 10.1 MG/DL   VANCOMYCIN, TROUGH    Collection Time: 04/19/17  3:23 AM   Result Value Ref Range    Vancomycin,trough 11.3 10.0 - 20.0 ug/mL    Reported dose date: 20170419      Reported dose time: 900      Reported dose: 1750 MG UNITS   GLUCOSE, POC    Collection Time: 04/19/17  7:17 AM   Result Value Ref Range    Glucose (POC) 259 (H) 70 - 110 mg/dL   GLUCOSE, POC    Collection Time: 04/19/17 11:54 AM   Result Value Ref Range    Glucose (POC) 208 (H) 70 - 110 mg/dL       Signed By: Christiano Cobb MD     April 19, 2017

## 2017-04-19 NOTE — PROGRESS NOTES
Bedside and Verbal shift change report given to Sury Mcqueen LPN (oncoming nurse) by Chris Zarate RN (offgoing nurse). Report included the following information SBAR, Kardex, MAR and Recent Results. SITUATION:    Code Status: Full Code  Reason for Admission: Abnormal feces [R19.5]  Encounter for colonoscopy due to history of adenomatous colonic polyps [Z12.11, Z86.010]   Cigarette smoker [N28.214]    [facility] day: 7   Problem List:       Hospital Problems  Date Reviewed: 4/17/2017          Codes Class Noted POA    Cellulitis and abscess of hand ICD-10-CM: L03.119, L02.519  ICD-9-CM: 682.4  4/13/2017 Unknown        Cellulitis and abscess of foot ICD-10-CM: L03.119, L02.619  ICD-9-CM: 682.7  4/13/2017 Unknown        Cellulitis ICD-10-CM: L03.90  ICD-9-CM: 682.9  4/12/2017 Unknown              BACKGROUND:    Past Medical History:   Past Medical History:   Diagnosis Date    Anxiety     Asthma     Back injury 1986    jumped out of second story window    Behavioral change     Bilateral knee pain     Bipolar disorder ([facility] [address]     Chronic airway obstruction, not elsewhere classified     SOB, on paula O2 Recent admission with psychosis    Chronic back pain     Chronic diastolic heart failure (HCC)     Stable on diuretics    Chronic kidney disease     stage III    Congestive heart failure (HCC)     COPD (chronic obstructive pulmonary disease) ([facility] [address] 10/31/2012    Cramps, muscle, general     Depression     Diabetes mellitus     Difficulty speaking     Difficulty walking     Difficulty writing     DJD (degenerative joint disease) of knee     bilateral, mild    Edema     The edema involves both lower extremities. The episodes last for 1 week. The patient describes this as worsening. Symptoms are exacerbated by prolonged sitting. Symptoms are relieved by leg elevation and diuretics. NO CHANGE    Essential hypertension, benign     Better controlled    Falls frequently     Fatigue     Generalized stiffness     Headache     Heartburn     Hiatal hernia     History of hepatitis C     treated    Hoarseness of voice     Hypothyroidism     Lung disease     Memory difficulty     Morbid obesity (HCC)     Multiple joint pain     Nausea     Numbness and tingling     arms and legs, related to diabetic neuropathy    Osteoarthritis of left knee     Other and unspecified hyperlipidemia     LDL reportedly very high/started on crestor recently by diabetic doctor    Oxygen dependent     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     Sarcoidosis ([facility] [address]     Sickle cell trait ([facility] [address]     Sinusitis     SOB (shortness of breath)     STD (female)     herpes    Swelling of body region     legs and feet    Tinnitus     Venous insufficiency     Vertigo     Weakness generalized     Wears glasses     Weight gain          Patient taking anticoagulants no     ASSESSMENT:    Changes in Assessment Throughout Shift: none    Patient has Central Line: no Patient has Renae Cath: no    Last Vitals:     Vitals:    04/18/17 2241 04/19/17 0557 04/19/17 0803 04/19/17 0919   BP: 129/81 133/82 (!) 135/91    Pulse: 60 68 66    Resp: 18 16 18    Temp: 98.2 °F (36.8 °C) 98 °F (36.7 °C) 97 °F (36.1 °C)    SpO2: 97% 93% 99% 99%   Weight:       Height:       LMP: 11/25/2012        IV and DRAINS (will only show if present)   [REMOVED] Peripheral IV 04/15/17 Right; Outer Forearm-Site Assessment: Clean, dry, & intact  [REMOVED] Peripheral IV 04/17/17 Right Arm-Site Assessment: Clean, dry, & intact  [REMOVED] Peripheral IV 04/17/17 Left Antecubital-Site Assessment: Clean, dry, & intact  [REMOVED] Peripheral IV 04/12/17 Left Forearm-Site Assessment: Clean, dry, & intact  Peripheral IV 04/19/17 Right Forearm-Site Assessment: Clean, dry, & intact     WOUND (if present)   Wound Type: left foot and left hand   Dressing present Dressing Present : No   Wound Concerns/Notes:  none     PAIN    Pain Assessment    Pain Intensity 1: 8 (04/19/17 0830)    Pain Location 1: Hand    Pain Intervention(s) 1: Elevation    Patient Stated Pain Goal: 0  o Interventions for Pain:  Morphine, Norco  o Intervention effective: yes  o Time of last intervention: Norco @ 0603  o Reassessment Completed: yes      Last 3 Weights:  Last 3 Recorded Weights in this Encounter    04/14/17 2340 04/16/17 0033 04/17/17 0445   Weight: 98.3 kg (216 lb 11.4 oz) 98.4 kg (217 lb) 102.3 kg (225 lb 8 oz)     Weight change:      INTAKE/OUPUT    Current Shift: 04/19 0701 - 04/19 1900  In: 440 [P.O.:440]  Out: 600 [Urine:600]    Last three shifts: 04/17 1901 - 04/19 0700  In: 500 [I.V.:500]  Out: 510 [Urine:500]     LAB RESULTS     Recent Labs      04/17/17   0306   WBC  7.5   HGB  12.4   HCT  38.1   PLT  151        Recent Labs      04/19/17   0323  04/18/17   0300  04/17/17   0306   NA  138  140  141   K  4.5  4.0  4.6   GLU  188*  164*  185*   BUN  12  10  9   CREA  0.95  1.34*  0.96   CA  9.1  8.6  8.9   MG   --   1.6  1.4*       RECOMMENDATIONS AND DISCHARGE PLANNING     1. Pending tests/procedures/ Plan of Care or Other Needs: wound care, antibiotics    2. Discharge plan for patient and Needs/Barriers: Home    3. Estimated Discharge Date: 4/20/2017 Posted on Whiteboard in \[facility]\"": yes      4. The patient's care plan was reviewed with the oncoming nurse. \"HEALS\" SAFETY CHECK      Fall Risk    Total Score: 3    Safety Measures: Safety Measures: Bed/Chair-Wheels locked, Bed in low position, Call light within reach    A safety check occurred in the patient's room between off going nurse and oncoming nurse listed above.     The safety check included the below items  Area Items   H  High Alert Medications - Verify all high alert medication drips (heparin, PCA, etc.)   E  Equipment - Suction is set up for ALL patients (with yanker)  - Red plugs utilized for all equipment (IV pumps, etc.)  - WOWs wiped down at end of shift.  - Room stocked with oxygen, suction, and other unit-specific supplies   A  Alarms - Bed alarm is set for fall risk patients  - Ensure chair alarm is in place and activated if patient is up in a chair   L  Lines - Check IV for any infiltration  - Renae bag is empty if patient has a Renae   - Tubing and IV bags are labeled   S  Safety   - Room is clean, patient is clean, and equipment is clean. - Hallways are clear from equipment besides carts. - Fall bracelet on for fall risk patients  - Ensure room is clear and free of clutter  - Suction is set up for ALL patients (with yanker)  - Hallways are clear from equipment besides carts.    - Isolation precautions followed, supplies available outside room, sign posted     Keely Salguero RN

## 2017-04-19 NOTE — DIABETES MGMT
Glycemic Control Plan of Care    Assessment/Recommendations:  Patient is 62year old with past medical history including diabetes mellitus, peripheral neuropathy secondary to diabetes, venous insufficiency, morbid obesity, hypertension, major depressive disorder, chronic diastolic heart failure, chronic airway obstruction, oxygen dependent, hepatitis C, sickle cell trait, acute renal failure, cellulitis and foot abscess - was admitted on 04/12/2017 with c/o increased pain and swelling of left hand and foot. Noted:  Left hand abscess and cellulitis/self injecting IVDA. Left foot cellulitis. Type 2 diabetes mellitus with current A1C of 10.8% (04/12/2017). POC BG range on 04/18/2017: 181-313 mg/dL. POC BG report on 04/19/2017 at time of review: 259, 208 mg/dL. Patient stated that she is eating meals without any problem. Current Meal Intake:  Patient Vitals for the past 100 hrs:   % Diet Eaten   04/19/17 1321 100 %   04/19/17 0925 100 %     Recommendation: POC BGs above target of <180 mg/dL. Called Dr. Baldo Grace and obtained order to add prandial insulin. Continue to monitor and adjust insulin dose as needed based on BG pattern. Most recent blood glucose values:    Results for Abby Valderrama (MRN [id number]) as of 4/19/2017 13:27   Ref. Range 4/18/2017 08:48 4/18/2017 12:08 4/18/2017 17:34 4/18/2017 22:38   GLUCOSE,FAST - POC Latest Ref Range: 70 - 110 mg/dL 201 (H) 181 (H) 313 (H) 209 (H)     Results for Abby Valderrama (MRN [id number]) as of 4/19/2017 13:27   Ref. Range 4/19/2017 07:17 4/19/2017 11:54   GLUCOSE,FAST - POC Latest Ref Range: 70 - 110 mg/dL 259 (H) 208 (H)     Current A1C: 10.8% (04/12/2017) is equivalent to average blood glucose of 263 mg/dL during the past 2-3 months. Current hospital diabetes medications:  Basal lantus insulin 45 units every 12 hours since 04/15/2017. Prandial lispro insulin 5 units TID AC, first dose ordered 04/19/2017. Correctional lispro insulin ACHS.  Very resistant dose. Total daily dose insulin requirement previous day: 04/18/2017  Lantus: 90 units  Lispro: 27 units    Home diabetes medications: As stated by patient on 04/14/2017:  Regular U-500 insulin:  Breakfast: 15-20 units depending on the size of meal.  Lunch: 15-20 units depending on the size of meal.  Dinner: 22 units     Diet: Diabetic consistent carb regular    Goals:  Blood glucose will be within target range of  mg/dL by 04/22/2017.      Education:  _X__  Refer to Diabetes Education Record: 4/14/2017             ___  Education not indicated at this time    Alma Delia Dorsey RN

## 2017-04-20 VITALS
RESPIRATION RATE: 20 BRPM | TEMPERATURE: 98.3 F | SYSTOLIC BLOOD PRESSURE: 115 MMHG | OXYGEN SATURATION: 98 % | HEART RATE: 67 BPM | BODY MASS INDEX: 38.69 KG/M2 | WEIGHT: 232.2 LBS | HEIGHT: 65 IN | DIASTOLIC BLOOD PRESSURE: 62 MMHG

## 2017-04-20 LAB
BACTERIA SPEC CULT: ABNORMAL
BACTERIA SPEC CULT: ABNORMAL
EST. AVERAGE GLUCOSE BLD GHB EST-MCNC: 263 MG/DL
GLUCOSE BLD STRIP.AUTO-MCNC: 108 MG/DL (ref 70–110)
GLUCOSE BLD STRIP.AUTO-MCNC: 170 MG/DL (ref 70–110)
GLUCOSE BLD STRIP.AUTO-MCNC: 192 MG/DL (ref 70–110)
GRAM STN SPEC: ABNORMAL
GRAM STN SPEC: ABNORMAL
HBA1C MFR BLD: 10.8 % (ref 4.2–5.6)
SERVICE CMNT-IMP: ABNORMAL

## 2017-04-20 PROCEDURE — 77010033678 HC OXYGEN DAILY

## 2017-04-20 PROCEDURE — 36415 COLL VENOUS BLD VENIPUNCTURE: CPT | Performed by: INTERNAL MEDICINE

## 2017-04-20 PROCEDURE — 74011636637 HC RX REV CODE- 636/637: Performed by: INTERNAL MEDICINE

## 2017-04-20 PROCEDURE — 74011250636 HC RX REV CODE- 250/636: Performed by: INTERNAL MEDICINE

## 2017-04-20 PROCEDURE — 74011636637 HC RX REV CODE- 636/637: Performed by: HOSPITALIST

## 2017-04-20 PROCEDURE — 74011250636 HC RX REV CODE- 250/636: Performed by: HOSPITALIST

## 2017-04-20 PROCEDURE — 83036 HEMOGLOBIN GLYCOSYLATED A1C: CPT | Performed by: INTERNAL MEDICINE

## 2017-04-20 PROCEDURE — 74011250637 HC RX REV CODE- 250/637: Performed by: HOSPITALIST

## 2017-04-20 PROCEDURE — 82962 GLUCOSE BLOOD TEST: CPT

## 2017-04-20 RX ORDER — OXYCODONE AND ACETAMINOPHEN 5; 325 MG/1; MG/1
1 TABLET ORAL
Qty: 15 TAB | Refills: 0 | Status: ON HOLD | OUTPATIENT
Start: 2017-04-20 | End: 2017-05-31

## 2017-04-20 RX ORDER — LEVOFLOXACIN 750 MG/1
750 TABLET ORAL DAILY
Qty: 7 TAB | Refills: 0 | Status: SHIPPED | OUTPATIENT
Start: 2017-04-20 | End: 2017-04-27

## 2017-04-20 RX ORDER — INSULIN GLARGINE 100 [IU]/ML
50 INJECTION, SOLUTION SUBCUTANEOUS EVERY 12 HOURS
Status: DISCONTINUED | OUTPATIENT
Start: 2017-04-20 | End: 2017-04-20 | Stop reason: HOSPADM

## 2017-04-20 RX ADMIN — POTASSIUM CHLORIDE 20 MEQ: 1.5 POWDER, FOR SOLUTION ORAL at 08:40

## 2017-04-20 RX ADMIN — INSULIN LISPRO 5 UNITS: 100 INJECTION, SOLUTION INTRAVENOUS; SUBCUTANEOUS at 08:49

## 2017-04-20 RX ADMIN — INSULIN GLARGINE 45 UNITS: 100 INJECTION, SOLUTION SUBCUTANEOUS at 08:47

## 2017-04-20 RX ADMIN — LEVOTHYROXINE SODIUM 75 MCG: 75 TABLET ORAL at 08:40

## 2017-04-20 RX ADMIN — Medication 1 TABLET: at 09:00

## 2017-04-20 RX ADMIN — INSULIN LISPRO 5 UNITS: 100 INJECTION, SOLUTION INTRAVENOUS; SUBCUTANEOUS at 12:37

## 2017-04-20 RX ADMIN — SODIUM CHLORIDE 125 ML/HR: 900 INJECTION, SOLUTION INTRAVENOUS at 07:59

## 2017-04-20 RX ADMIN — ASPIRIN 81 MG: 81 TABLET, COATED ORAL at 08:40

## 2017-04-20 RX ADMIN — HYDROCODONE BITARTRATE AND ACETAMINOPHEN 1 TABLET: 5; 325 TABLET ORAL at 16:38

## 2017-04-20 RX ADMIN — GABAPENTIN 300 MG: 300 CAPSULE ORAL at 15:17

## 2017-04-20 RX ADMIN — INSULIN LISPRO 3 UNITS: 100 INJECTION, SOLUTION INTRAVENOUS; SUBCUTANEOUS at 08:48

## 2017-04-20 RX ADMIN — SERTRALINE HYDROCHLORIDE 50 MG: 50 TABLET ORAL at 08:40

## 2017-04-20 RX ADMIN — ERGOCALCIFEROL 50000 UNITS: 1.25 CAPSULE ORAL at 08:40

## 2017-04-20 RX ADMIN — POLYETHYLENE GLYCOL 3350 17 G: 17 POWDER, FOR SOLUTION ORAL at 08:41

## 2017-04-20 RX ADMIN — INSULIN LISPRO 5 UNITS: 100 INJECTION, SOLUTION INTRAVENOUS; SUBCUTANEOUS at 16:36

## 2017-04-20 RX ADMIN — ARIPIPRAZOLE 10 MG: 10 TABLET ORAL at 08:40

## 2017-04-20 RX ADMIN — HYDROCODONE BITARTRATE AND ACETAMINOPHEN 1 TABLET: 5; 325 TABLET ORAL at 04:31

## 2017-04-20 RX ADMIN — GABAPENTIN 300 MG: 300 CAPSULE ORAL at 08:40

## 2017-04-20 RX ADMIN — HYDROCODONE BITARTRATE AND ACETAMINOPHEN 1 TABLET: 5; 325 TABLET ORAL at 12:41

## 2017-04-20 RX ADMIN — VANCOMYCIN HYDROCHLORIDE 1750 MG: 10 INJECTION, POWDER, LYOPHILIZED, FOR SOLUTION INTRAVENOUS at 15:17

## 2017-04-20 RX ADMIN — EPLERENONE 25 MG: 25 TABLET, FILM COATED ORAL at 08:40

## 2017-04-20 RX ADMIN — HYDROCODONE BITARTRATE AND ACETAMINOPHEN 1 TABLET: 5; 325 TABLET ORAL at 08:41

## 2017-04-20 RX ADMIN — INSULIN LISPRO 3 UNITS: 100 INJECTION, SOLUTION INTRAVENOUS; SUBCUTANEOUS at 12:36

## 2017-04-20 NOTE — INTERDISCIPLINARY ROUNDS
IDR/SLIDR Summary          Patient: Sally Grossman MRN: [id number]    Age: 62 y.o. YOB: 1955 Room/Bed: [facility]/   Admit Diagnosis: Abnormal feces [R19.5]  Encounter for colonoscopy due to history of adenomatous colonic polyps [Z12.11, Z86.010]  Cigarette smoker [F17.210]  Principal Diagnosis: <principal problem not specified>   Goals: infection control and diabetes management  Readmission: NO  Quality Measure: Not applicable  VTE Prophylaxis: Chemical  Influenza Vaccine screening completed? YES  Pneumococcal Vaccine screening completed? YES  Mobility needs: Yes   Nutrition plan:No  Consults:Case Management    Financial concerns:No  Escalated to CM? YES  RRAT Score: 24   [facility]   Testing due for pt today?  NO  LOS: 8 days Expected length of stay 5 days  Discharge plan: home   PCP: Tayo Encinas MD  Transportation needs: No    Days before discharge:two or more days before discharge   Discharge disposition: Home    Signed:     Rolando Grider RN  4/20/2017

## 2017-04-20 NOTE — PROGRESS NOTES
Pt provided [facility] [place] for wound care for home health care services, pt chose [facility] This was a previous provider for this pt. Pt accepted by [facility], pt reports that security lost her 02 and she cannot return home without.

## 2017-04-20 NOTE — PROGRESS NOTES
Pt reports that her home oxygen tank got left in ED with security. Have called ED, and Security x 2. They states they do not have it. Notified unit manager Ismael Balderas and care manager Beth Max.

## 2017-04-20 NOTE — PROGRESS NOTES
Infectious Disease progress Note    Requested by: Dr. Jerry Valentin    Reason: left hand and left foot abscess    Current abx Prior abx   vancomycin since 4/12/17 Meropenem 4/12-4/18, levofloxacin on 4/18     Lines:       Assessment :    79-year-old black female with h/o IVDA ( last used IV heroin and cocaine 2  days prior to admission) admitted to [facility] on 4/12/17 with increasing left hand and foot pain. Clinical picture consistent with left foot and left hand cellulitis, abscess. Exact microbial etiology of infection not clear. Patient has been appropriately managed with I&D on 4/17. Prior antibiotics will likely interfere with current cultures. Cultures: strep viridans, cons    Antibiotic de escalation difficult since life threatening allergy to pcn (anaphylaxis 30 years ago). Non life threatening allergy to moxifloxacin. She has tolerated levofloxacin in the past. Patient agrees to try levofloxacin. Benefits and risks of this approach including rash, hives, swelling of throat, anaphylaxis, death explained to patient in details. She verbalized her understanding and wishes to proceed. Nursing staff was advised to monitor closely for allergy. Give antihistaminics & call me if any allergic reaction noted. Recommendations:    1. Discontinue vancomycin  2. Switch to po levofloxacin for 7 days (ok to use levofloxacin despite h/o moxifloxacin allergy since she has tolerated levofloxacin in the hospital)  3. Ok to d/c patient from id standpoint     Above plan was discussed in details with patient, dr Jerry Valentin. Please call me if any further questions or concerns. Will continue to participate in the care of this patient. subjective:    Patient states that her pain is better. Patient denies headaches, visual disturbances, sore throat, runny nose, earaches, cp, sob, chills, cough, abdominal pain, diarrhea, burning micturition, or weakness in extremities. she denies back pain/flank pain. No h/o recent travel.  No known h/o MRSA colonization or infection in the past.        Home Medication List    Details   predniSONE (DELTASONE) 5 mg tablet TAKE ONE TABLET EVERY DAY  Qty: 30 Tab, Refills: 0       Details   umeclidinium-vilanterol (ANORO ELLIPTA) 62.5-25 mcg/actuation inhaler Take 1 Puff by inhalation daily. Qty: 1 Inhaler, Refills: 5      VENTOLIN HFA 90 mcg/actuation inhaler INHALE 2 PUFFS EVERY 4 HOURS AS NEEDED  Qty: 1 Inhaler, Refills: 4      divalproex DR (DEPAKOTE) 250 mg tablet Take 250 mg by mouth nightly. furosemide (LASIX) 20 mg tablet Take 1 Tab by mouth daily. Qty: 30 Tab, Refills: 0      metOLazone (ZAROXOLYN) 5 mg tablet Take 0.5 Tabs by mouth Every Mon, Wed & Sun.  Qty: 30 Tab, Refills: 6      potassium chloride (KLOR-CON) 20 mEq packet Take 1 Packet by mouth daily. Qty: 30 Each, Refills: 0      clopidogrel (PLAVIX) 75 mg tablet Take 1 Tab by mouth daily. Qty: 30 Tab, Refills: 0      insulin glargine (LANTUS) 100 unit/mL injection 60 units subcutaneously every night  Qty: 1 Vial, Refills: 0      insulin syringe,safetyneedle 1 mL 30 gauge x 5/16\" syrg As directed  Qty: 50 Each, Refills: 0      ARIPiprazole (ABILIFY) 5 mg tablet Take 10 mg by mouth daily. Comments: take 2 tabs each morning      albuterol (PROVENTIL VENTOLIN) 2.5 mg /3 mL (0.083 %) nebulizer solution USE 1 NEB EVERY 4 HOURS FOR WHEEZING AND SHORTNESS OF BREATH  Indications: CHRONIC OBSTRUCTIVE PULMONARY DISEASE  Qty: 2 Package, Refills: 3    Associated Diagnoses: Chronic obstructive pulmonary disease, unspecified COPD type (HCC)      eplerenone (INSPRA) 25 mg tablet Take  by mouth daily. gabapentin (NEURONTIN) 300 mg capsule Take 300 mg by mouth three (3) times daily. acetaminophen (TYLENOL) 325 mg tablet Take 325 mg by mouth every six (6) hours as needed for Pain. traZODone (DESYREL) 100 mg tablet Take 100 mg by mouth nightly. rosuvastatin (CRESTOR) 5 mg tablet Take 1 Tab by mouth nightly.   Qty: 30 Tab, Refills: 6 l-methylfolate-vit b12-vit b6 (METANX) 2.8-2-25 mg Tab Take  by mouth. aspirin 81 mg tablet Take 81 mg by mouth daily. Nebulizer Accessories Kit Use with nebulizer machine  Qty: 1 Kit, Refills: prn    Associated Diagnoses: COPD (chronic obstructive pulmonary disease) (HCC)      insulin CONCENTRATED regular (U-500 CONCENTRATED) 500 unit/mL Soln 20 Units by SubCUTAneous route. With breakfast, lunch, and dinner      levothyroxine (SYNTHROID) 100 mcg tablet Take  by mouth Daily (before breakfast). sertraline (ZOLOFT) 100 mg tablet Take 50 mg by mouth daily. Oxygen-Air Delivery Systems by Nasal route continuous. 2 LNC      ergocalciferol (VITAMIN D) 50,000 unit capsule Take 50,000 Units by mouth daily.              Current Facility-Administered Medications   Medication Dose Route Frequency    insulin glargine (LANTUS) injection 50 Units  50 Units SubCUTAneous Q12H    insulin lispro (HUMALOG) injection 5 Units  0.05 Units/kg SubCUTAneous TID WITH MEALS    diphenhydrAMINE (BENADRYL) injection 25 mg  25 mg IntraVENous Q4H PRN    polyethylene glycol (MIRALAX) packet 17 g  17 g Oral DAILY    senna-docusate (PERICOLACE) 8.6-50 mg per tablet 2 Tab  2 Tab Oral QHS    acetaminophen (TYLENOL) tablet 500 mg  500 mg Oral Q6H PRN    vancomycin (VANCOCIN) 1,750 mg in 0.9% sodium chloride 500 mL IVPB  1,750 mg IntraVENous Q18H    ARIPiprazole (ABILIFY) tablet 10 mg  10 mg Oral DAILY    aspirin delayed-release tablet 81 mg  81 mg Oral DAILY    divalproex DR (DEPAKOTE) tablet 250 mg  250 mg Oral QHS    eplerenone (INSPRA) tablet 25 mg  25 mg Oral DAILY    ergocalciferol (ERGOCALCIFEROL) capsule 50,000 Units  50,000 Units Oral DAILY    gabapentin (NEURONTIN) capsule 300 mg  300 mg Oral TID    folic acid-vit P3-XTU K96 (FOLTX) 2.5-25-2 mg tablet 1 Tab  1 Tab Oral DAILY    levothyroxine (SYNTHROID) tablet 75 mcg  75 mcg Oral ACB    metOLazone (ZAROXOLYN) tablet 2.5 mg  2.5 mg Oral Q MON, WED & SUN    potassium chloride (KLOR-CON) packet 20 mEq  20 mEq Oral DAILY    rosuvastatin (CRESTOR) tablet 5 mg  5 mg Oral QHS    sertraline (ZOLOFT) tablet 50 mg  50 mg Oral DAILY    traZODone (DESYREL) tablet 100 mg  100 mg Oral QHS    0.9% sodium chloride infusion  125 mL/hr IntraVENous CONTINUOUS    HYDROcodone-acetaminophen (NORCO) 5-325 mg per tablet 1 Tab  1 Tab Oral Q4H PRN    naloxone (NARCAN) injection 0.4 mg  0.4 mg IntraVENous PRN    insulin lispro (HUMALOG) injection   SubCUTAneous AC&HS    glucose chewable tablet 16 g  4 Tab Oral PRN    glucagon (GLUCAGEN) injection 1 mg  1 mg IntraMUSCular PRN    dextrose (D50W) injection syrg 12.5-25 g  25-50 mL IntraVENous PRN    morphine injection 1 mg  1 mg IntraVENous Q4H PRN    ondansetron (ZOFRAN) injection 4 mg  4 mg IntraVENous Q6H PRN    tiotropium (SPIRIVA) inhalation capsule 18 mcg  1 Cap Inhalation DAILY    albuterol-ipratropium (DUO-NEB) 2.5 MG-0.5 MG/3 ML  3 mL Nebulization Q6H PRN    umeclidinium-vilanterol (ANORO ELLIPTA) 62.5 mcg- 25 mcg/inhalation  1 Puff Inhalation DAILY       Allergies: Moxifloxacin; Pcn [penicillins]; and Sulfa (sulfonamide antibiotics)    Family History   Problem Relation Age of Onset    Seizures Other     Diabetes Mother     Hypertension Mother     Heart Disease Mother     Cancer Mother     Diabetes Father     Stroke Father     Heart Disease Father     Headache Sister     Depression Neg Hx     Suicide Neg Hx     Psychotic Disorder Neg Hx     Substance Abuse Neg Hx     Dementia Neg Hx      Social History     Social History    Marital status: SINGLE     Spouse name: N/A    Number of children: N/A    Years of education: N/A     Occupational History    Not employed Not Employed     Social History Main Topics    Smoking status: Former Smoker     Packs/day: 0.50     Years: 30.00     Types: Cigarettes     Start date: 12/2/1969     Quit date: 10/31/2016    Smokeless tobacco: Never Used      Comment: Per pt.  10 a day     Alcohol use No    Drug use: No      Comment: +Cocaine Screen     Sexual activity: Not on file      Comment:      Other Topics Concern    Not on file     Social History Narrative    Lives with 15year old son. History   Smoking Status    Former Smoker    Packs/day: 0.50    Years: 30.00    Types: Cigarettes    Start date: 1969   [employer] [place] Quit date: 10/31/2016   Smokeless Tobacco    Never Used     Comment: Per pt. 10 a day         Temp (24hrs), Av.9 °F (36.6 °C), Min:97.1 °F (36.2 °C), Max:98.2 °F (36.8 °C)    Visit Vitals    /85 (BP 1 Location: Right arm, BP Patient Position: At rest)    Pulse 75    Temp 98.2 °F (36.8 °C)    Resp 20    Ht 5' 5\" (1.651 m)    Wt 105.3 kg (232 lb 3.2 oz)    LMP 2012    SpO2 95%    BMI 38.64 kg/m2       ROS: 12 point ROS obtained in details. Pertinent positives as mentioned in HPI,   otherwise negative    Physical Exam:    General: Well developed, well nourished female laying on the bed AAOx3 in no acute distress. General:   awake alert and oriented   HEENT:  Normocephalic, atraumatic, PERRL, EOMI, no scleral icterus or pallor; no conjunctival hemmohage;  nasal and oral mucous are moist and without evidence of lesions. No thrush. Neck supple, no bruits. Lymph Nodes:   no cervical, axillary or inguinal adenopathy   Lungs:   non-labored, bilaterally clear to auscultation- no crackles wheezes rales or rhonchi   Heart:  RRR, s1 and s2; no murmurs rubs or gallops, no edema, + pedal pulses   Abdomen:  soft, non-distended, active bowel sounds, no hepatomegaly, no splenomegaly. Non-tender   Genitourinary:  deferred   Extremities:   no clubbing, cyanosis; no joint effusions or swelling; Full ROM of all large joints to the upper and lower extremities; muscle mass appropriate for age, left hand and left foot dressing not removed   Neurologic:  No gross focal sensory abnormalities; 5/5 muscle strength to upper and lower extremities.  Speech appropriate. Cranial nerves intact                        Skin:  No rash or ulcers noted   Back:  no spinal or paraspinal muscle tenderness or rigidity, no CVA tenderness     Psychiatric:  No suicidal or homicidal ideations, appropriate mood and affect         Labs: Results:   Chemistry Recent Labs      04/19/17   0323  04/18/17   0300   GLU  188*  164*   NA  138  140   K  4.5  4.0   CL  101  102   CO2  33*  33*   BUN  12  10   CREA  0.95  1.34*   CA  9.1  8.6   AGAP  4  5   BUCR  13  7*      CBC w/Diff No results for input(s): WBC, RBC, HGB, HCT, PLT, GRANS, LYMPH, EOS, HGBEXT, HCTEXT, PLTEXT, HGBEXT, HCTEXT, PLTEXT in the last 72 hours.    Microbiology Recent Labs      04/17/17 2016 04/17/17 2001   CULT  FEW  STAPHYLOCOCCUS EPIDERMIDIS  *  FEW  STAPHYLOCOCCUS EPIDERMIDIS  2ND MORPHOTYPE  *  RARE  STAPHYLOCOCCUS EPIDERMIDIS  3RD MORPHOTYPE  *  CULTURE IN PROGRESS,FURTHER UPDATES TO FOLLOW  PENDING  FEW  STAPHYLOCOCCUS EPIDERMIDIS  *  RARE  STREPTOCOCCUS VIRIDANS  *  NO ANAEROBES ISOLATED 3 DAYS  PENDING          RADIOLOGY:    All available imaging studies/reports in [facility] for this admission were reviewed    Dr. Ismael Fitzgerald, Infectious Disease Specialist  [phone]  April 20, 2017  1:57 PM

## 2017-04-20 NOTE — PROGRESS NOTES
Bedside and Verbal shift change report given to Lindsay Alfaro 34 (oncoming nurse) by Jake Miller RN (offgoing nurse). Report included the following information SBAR, Kardex, MAR and Recent Results. SITUATION:    Code Status: Full Code  Reason for Admission: Abnormal feces [R19.5]  Encounter for colonoscopy due to history of adenomatous colonic polyps [Z12.11, Z86.010]   Cigarette smoker [J36.032]    [facility] day: 8   Problem List:       Hospital Problems  Date Reviewed: 4/17/2017          Codes Class Noted POA    Cellulitis and abscess of hand ICD-10-CM: L03.119, L02.519  ICD-9-CM: 682.4  4/13/2017 Unknown        Cellulitis and abscess of foot ICD-10-CM: L03.119, L02.619  ICD-9-CM: 682.7  4/13/2017 Unknown        Cellulitis ICD-10-CM: L03.90  ICD-9-CM: 682.9  4/12/2017 Unknown              BACKGROUND:    Past Medical History:   Past Medical History:   Diagnosis Date    Anxiety     Asthma     Back injury 1986    jumped out of second story window    Behavioral change     Bilateral knee pain     [facility] disorder [facility] [address]     Chronic airway obstruction, not elsewhere classified     SOB, on paula O2 Recent admission with psychosis    Chronic back pain     Chronic diastolic heart failure (HCC)     Stable on diuretics    Chronic kidney disease     stage III    Congestive heart failure (HCC)     COPD (chronic obstructive pulmonary disease) ([facility] [address] 10/31/2012    Cramps, muscle, general     Depression     Diabetes mellitus     Difficulty speaking     Difficulty walking     Difficulty writing     DJD (degenerative joint disease) of knee     bilateral, mild    Edema     The edema involves both lower extremities. The episodes last for 1 week. The patient describes this as worsening. Symptoms are exacerbated by prolonged sitting. Symptoms are relieved by leg elevation and diuretics. NO CHANGE    Essential hypertension, benign     Better controlled    Falls frequently     Fatigue     Generalized stiffness  Headache     Heartburn     Hiatal hernia     History of hepatitis C     treated    Hoarseness of voice     Hypothyroidism     Lung disease     Memory difficulty     Morbid obesity (HCC)     Multiple joint pain     Nausea     Numbness and tingling     arms and legs, related to diabetic neuropathy    Osteoarthritis of left knee     Other and unspecified hyperlipidemia     LDL reportedly very high/started on crestor recently by diabetic doctor    Oxygen dependent     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     [facility]oidosis ([facility] [address]     Sickle cell trait ([facility] [address]     Sinusitis     SOB (shortness of breath)     STD (female)     herpes    Swelling of body region     legs and feet    Tinnitus     Venous insufficiency     Vertigo     Weakness generalized     Wears glasses     Weight gain          Patient taking anticoagulants no     ASSESSMENT:    Changes in Assessment Throughout Shift: None     Patient has Central Line: no    Patient has Renae Cath: no      Last Vitals:     Vitals:    04/19/17 1558 04/19/17 2036 04/20/17 0005 04/20/17 0419   BP: 137/86 (!) 162/94 (!) 151/93 144/87   Pulse: 60 77 67 60   Resp: 18 18 20 20   Temp: 98.1 °F (36.7 °C) 98 °F (36.7 °C) 98.1 °F (36.7 °C) 98 °F (36.7 °C)   SpO2: 100% 97% 95% 94%   Weight:       Height:       LMP: 11/25/2012        IV and DRAINS (will only show if present)   [REMOVED] Peripheral IV 04/15/17 Right; Outer Forearm-Site Assessment: Clean, dry, & intact  [REMOVED] Peripheral IV 04/17/17 Right Arm-Site Assessment: Clean, dry, & intact  [REMOVED] Peripheral IV 04/17/17 Left Antecubital-Site Assessment: Clean, dry, & intact  [REMOVED] Peripheral IV 04/12/17 Left Forearm-Site Assessment: Clean, dry, & intact  [REMOVED] Peripheral IV 04/19/17 Right Forearm-Site Assessment: Clean, dry, & intact  Peripheral IV 04/20/17 Left Arm-Site Assessment: Clean, dry, & intact     WOUND (if present)   Wound Type: Left hand /left foot   Dressing present Joan Cooper   Wound Concerns/Notes:  none     PAIN    Pain Assessment    Pain Intensity 1: 0 (04/20/17 0419)    Pain Location 1: Arm    Pain Intervention(s) 1: Medication (see MAR)    Patient Stated Pain Goal: 0  o Interventions for Pain:  Morphine IV  o Intervention effective: yes  o Time of last intervention: 4551   o Reassessment Completed: yes      Last 3 Weights:  Last 3 Recorded Weights in this Encounter    04/14/17 2340 04/16/17 0033 04/17/17 0445   Weight: 98.3 kg (216 lb 11.4 oz) 98.4 kg (217 lb) 102.3 kg (225 lb 8 oz)     Weight change:      INTAKE/OUPUT    Current Shift:      Last three shifts: 04/18 1901 - 04/20 0700  In: 3093.3 [P.O.:1160; I.V.:1933.3]  Out: 3000 [Urine:3000]     LAB RESULTS     No results for input(s): WBC, HGB, HCT, PLT, HGBEXT, HCTEXT, PLTEXT, HGBEXT, HCTEXT, PLTEXT in the last 72 hours. Recent Labs      04/19/17   0323  04/18/17   0300   NA  138  140   K  4.5  4.0   GLU  188*  164*   BUN  12  10   CREA  0.95  1.34*   CA  9.1  8.6   MG   --   1.6       RECOMMENDATIONS AND DISCHARGE PLANNING     1. Pending tests/procedures/ Plan of Care or Other Needs: None     2. Discharge plan for patient and Needs/Barriers: Home    3. Estimated Discharge Date: 4/19/2017 Posted on Whiteboard in \[facility]\"": yes      4. The patient's care plan was reviewed with the oncoming nurse. \"HEALS\" SAFETY CHECK      Fall Risk    Total Score: 3    Safety Measures: Safety Measures: Bed/Chair alarm on, Bed/Chair-Wheels locked, Bed in low position, Call light within reach    A safety check occurred in the patient's room between off going nurse and oncoming nurse listed above.     The safety check included the below items  Area Items   H  High Alert Medications - Verify all high alert medication drips (heparin, PCA, etc.)   E  Equipment - Suction is set up for ALL patients (with yanker)  - Red plugs utilized for all equipment (IV pumps, etc.)  - WOWs wiped down at end of shift.  - Room stocked with oxygen, suction, and other unit-specific supplies   A  Alarms - Bed alarm is set for fall risk patients  - Ensure chair alarm is in place and activated if patient is up in a chair   L  Lines - Check IV for any infiltration  - Renae bag is empty if patient has a Renae   - Tubing and IV bags are labeled   S  Safety   - Room is clean, patient is clean, and equipment is clean. - Hallways are clear from equipment besides carts. - Fall bracelet on for fall risk patients  - Ensure room is clear and free of clutter  - Suction is set up for ALL patients (with lashay)  - Hallways are clear from equipment besides carts.    - Isolation precautions followed, supplies available outside room, sign posted     Bryce Arce RN

## 2017-04-20 NOTE — DISCHARGE INSTRUCTIONS
[facility] to Follow. DISCHARGE SUMMARY from Nurse    The following personal items are in your possession at time of discharge:    Dental Appliances: At home  Visual Aid: At bedside, Glasses              Other Valuables: Eyeglasses             PATIENT INSTRUCTIONS:    After general anesthesia or intravenous sedation, for 24 hours or while taking prescription Narcotics:  · Limit your activities  · Do not drive and operate hazardous machinery  · Do not make important personal or business decisions  · Do  not drink alcoholic beverages  · If you have not urinated within 8 hours after discharge, please contact your surgeon on call. Report the following to your surgeon:  · Excessive pain, swelling, redness or odor of or around the surgical area  · Temperature over 100.5  · Nausea and vomiting lasting longer than 4 hours or if unable to take medications  · Any signs of decreased circulation or nerve impairment to extremity: change in color, persistent  numbness, tingling, coldness or increase pain  · Any questions        What to do at Home:  Recommended activity: Activity as tolerated, with assistance  If you experience any of the following symptoms pain not controlled. , please follow up with primary care doctor. *  Please give a list of your current medications to your Primary Care Provider. *  Please update this list whenever your medications are discontinued, doses are      changed, or new medications (including over-the-counter products) are added. *  Please carry medication information at all times in case of emergency situations. These are general instructions for a healthy lifestyle:    No smoking/ No tobacco products/ Avoid exposure to second hand smoke    Surgeon General's Warning:  Quitting smoking now greatly reduces serious risk to your health.     Obesity, smoking, and sedentary lifestyle greatly increases your risk for illness    A healthy diet, regular physical exercise & weight monitoring are important for maintaining a healthy lifestyle    You may be retaining fluid if you have a history of heart failure or if you experience any of the following symptoms:  Weight gain of 3 pounds or more overnight or 5 pounds in a week, increased swelling in our hands or feet or shortness of breath while lying flat in bed. Please call your doctor as soon as you notice any of these symptoms; do not wait until your next office visit. Recognize signs and symptoms of STROKE:    F-face looks uneven    A-arms unable to move or move unevenly    S-speech slurred or non-existent    T-time-call 911 as soon as signs and symptoms begin-DO NOT go       Back to bed or wait to see if you get better-TIME IS BRAIN. Warning Signs of HEART ATTACK     Call 911 if you have these symptoms:   Chest discomfort. Most heart attacks involve discomfort in the center of the chest that lasts more than a few minutes, or that goes away and comes back. It can feel like uncomfortable pressure, squeezing, fullness, or pain.  Discomfort in other areas of the upper body. Symptoms can include pain or discomfort in one or both arms, the back, neck, jaw, or stomach.  Shortness of breath with or without chest discomfort.  Other signs may include breaking out in a cold sweat, nausea, or lightheadedness. Don't wait more than five minutes to call 911 - MINUTES MATTER! Fast action can save your life. Calling 911 is almost always the fastest way to get lifesaving treatment. Emergency Medical Services staff can begin treatment when they arrive -- up to an hour sooner than if someone gets to the hospital by car. The discharge information has been reviewed with the patient. The patient verbalized understanding. Discharge medications reviewed with the patient and appropriate educational materials and side effects teaching were provided. [employer] Activation    Thank you for requesting access to [employer].  Please follow the instructions below to securely access and download your online medical record. [facility] allows you to send messages to your doctor, view your test results, renew your prescriptions, schedule appointments, and more. How Do I Sign Up? 1. In your internet browser, go to [email or web address]  2. Click on the First Time User? Click Here link in the Sign In box. You will be redirect to the New Member Sign Up page. 3. Enter your [facility] Access Code exactly as it appears below. You will not need to use this code after youve completed the sign-up process. If you do not sign up before the expiration date, you must request a new code. [employer] Access Code: Activation code not generated  Current [facility] Status: Patient Declined (This is the date your [facility] access code will )    4. Enter the last four digits of your Social Security Number (xxxx) and Date of Birth (mm/dd/yyyy) as indicated and click Submit. You will be taken to the next sign-up page. 5. Create a [facility] ID. This will be your [facility] login ID and cannot be changed, so think of one that is secure and easy to remember. 6. Create a [facility] password. You can change your password at any time. 7. Enter your Password Reset Question and Answer. This can be used at a later time if you forget your password. 8. Enter your e-mail address. You will receive e-mail notification when new information is available in [address] 9. Click Sign Up. You can now view and download portions of your medical record. 10. Click the Download Summary menu link to download a portable copy of your medical information. Additional Information    If you have questions, please visit the Frequently Asked Questions section of the [facility] website at [email or web address] Remember, [facility] is NOT to be used for urgent needs. For medical emergencies, dial 911.

## 2017-04-20 NOTE — PROGRESS NOTES
THERESA Sinha seen this AM      Visit Vitals    /87    Pulse 60    Temp 98 °F (36.7 °C)    Resp 20    Ht 5' 5\" (1.651 m)    Wt 232 lb 3.2 oz (105.3 kg)    SpO2 94%    BMI 38.64 kg/m2      Left wrist dressings and left foot dressings changed by me. Wounds look good. No pus or malodor. Impression    1. Left wrist wound: continue Aqua cell AG/Smellie dressings  2.  Left foot wound: Aqua cell AG/Mepilex dressings        Bhavani Galindo MD  4/20/2017  8:18 AM

## 2017-04-21 LAB
BACTERIA SPEC CULT: ABNORMAL
GRAM STN SPEC: ABNORMAL
GRAM STN SPEC: ABNORMAL
SERVICE CMNT-IMP: ABNORMAL

## 2017-04-22 LAB
BACTERIA SPEC CULT: NORMAL
BACTERIA SPEC CULT: NORMAL
SERVICE CMNT-IMP: NORMAL
SERVICE CMNT-IMP: NORMAL

## 2017-05-01 ENCOUNTER — HOSPITAL ENCOUNTER (INPATIENT)
Age: 62
LOS: 8 days | Discharge: NURSING, REHAB OR LONG-TERM CARE | DRG: 383 | End: 2017-05-10
Attending: EMERGENCY MEDICINE | Admitting: INTERNAL MEDICINE
Payer: MEDICAID

## 2017-05-01 ENCOUNTER — APPOINTMENT (OUTPATIENT)
Dept: GENERAL RADIOLOGY | Age: 62
DRG: 383 | End: 2017-05-01
Attending: PHYSICIAN ASSISTANT
Payer: MEDICAID

## 2017-05-01 DIAGNOSIS — S42.402A LEFT ELBOW FRACTURE, CLOSED, INITIAL ENCOUNTER: ICD-10-CM

## 2017-05-01 DIAGNOSIS — R65.10 SIRS (SYSTEMIC INFLAMMATORY RESPONSE SYNDROME) (HCC): Primary | ICD-10-CM

## 2017-05-01 LAB
ANION GAP BLD CALC-SCNC: 9 MMOL/L (ref 3–18)
BASOPHILS # BLD AUTO: 0 K/UL (ref 0–0.1)
BASOPHILS # BLD: 0 % (ref 0–2)
BUN SERPL-MCNC: 26 MG/DL (ref 7–18)
BUN/CREAT SERPL: 14 (ref 12–20)
CALCIUM SERPL-MCNC: 9.6 MG/DL (ref 8.5–10.1)
CHLORIDE SERPL-SCNC: 92 MMOL/L (ref 100–108)
CO2 SERPL-SCNC: 31 MMOL/L (ref 21–32)
CREAT SERPL-MCNC: 1.84 MG/DL (ref 0.6–1.3)
DIFFERENTIAL METHOD BLD: ABNORMAL
EOSINOPHIL # BLD: 0 K/UL (ref 0–0.4)
EOSINOPHIL NFR BLD: 0 % (ref 0–5)
ERYTHROCYTE [DISTWIDTH] IN BLOOD BY AUTOMATED COUNT: 14.1 % (ref 11.6–14.5)
GLUCOSE SERPL-MCNC: 252 MG/DL (ref 74–99)
HCT VFR BLD AUTO: 41.5 % (ref 35–45)
HGB BLD-MCNC: 14.3 G/DL (ref 12–16)
LACTATE BLD-SCNC: 2.2 MMOL/L (ref 0.4–2)
LYMPHOCYTES # BLD AUTO: 12 % (ref 21–52)
LYMPHOCYTES # BLD: 1.3 K/UL (ref 0.9–3.6)
MCH RBC QN AUTO: 27.6 PG (ref 24–34)
MCHC RBC AUTO-ENTMCNC: 34.5 G/DL (ref 31–37)
MCV RBC AUTO: 80 FL (ref 74–97)
MONOCYTES # BLD: 1.1 K/UL (ref 0.05–1.2)
MONOCYTES NFR BLD AUTO: 11 % (ref 3–10)
NEUTS SEG # BLD: 7.8 K/UL (ref 1.8–8)
NEUTS SEG NFR BLD AUTO: 77 % (ref 40–73)
PLATELET # BLD AUTO: 136 K/UL (ref 135–420)
PMV BLD AUTO: 9.6 FL (ref 9.2–11.8)
POTASSIUM SERPL-SCNC: 3.8 MMOL/L (ref 3.5–5.5)
RBC # BLD AUTO: 5.19 M/UL (ref 4.2–5.3)
SODIUM SERPL-SCNC: 132 MMOL/L (ref 136–145)
WBC # BLD AUTO: 10.3 K/UL (ref 4.6–13.2)

## 2017-05-01 PROCEDURE — 83605 ASSAY OF LACTIC ACID: CPT

## 2017-05-01 PROCEDURE — 94640 AIRWAY INHALATION TREATMENT: CPT

## 2017-05-01 PROCEDURE — 80048 BASIC METABOLIC PNL TOTAL CA: CPT | Performed by: PHYSICIAN ASSISTANT

## 2017-05-01 PROCEDURE — 73080 X-RAY EXAM OF ELBOW: CPT

## 2017-05-01 PROCEDURE — 73562 X-RAY EXAM OF KNEE 3: CPT

## 2017-05-01 PROCEDURE — 96375 TX/PRO/DX INJ NEW DRUG ADDON: CPT

## 2017-05-01 PROCEDURE — 96366 THER/PROPH/DIAG IV INF ADDON: CPT

## 2017-05-01 PROCEDURE — 85025 COMPLETE CBC W/AUTO DIFF WBC: CPT | Performed by: PHYSICIAN ASSISTANT

## 2017-05-01 PROCEDURE — 74011000258 HC RX REV CODE- 258: Performed by: PHYSICIAN ASSISTANT

## 2017-05-01 PROCEDURE — 77030013140 HC MSK NEB VYRM -A

## 2017-05-01 PROCEDURE — 73110 X-RAY EXAM OF WRIST: CPT

## 2017-05-01 PROCEDURE — 73600 X-RAY EXAM OF ANKLE: CPT

## 2017-05-01 PROCEDURE — 87040 BLOOD CULTURE FOR BACTERIA: CPT | Performed by: PHYSICIAN ASSISTANT

## 2017-05-01 PROCEDURE — 74011250636 HC RX REV CODE- 250/636: Performed by: PHYSICIAN ASSISTANT

## 2017-05-01 PROCEDURE — 99285 EMERGENCY DEPT VISIT HI MDM: CPT

## 2017-05-01 PROCEDURE — 96365 THER/PROPH/DIAG IV INF INIT: CPT

## 2017-05-01 RX ORDER — SODIUM CHLORIDE 9 MG/ML
1000 INJECTION, SOLUTION INTRAVENOUS ONCE
Status: COMPLETED | OUTPATIENT
Start: 2017-05-01 | End: 2017-05-02

## 2017-05-01 RX ORDER — MORPHINE SULFATE 2 MG/ML
2 INJECTION, SOLUTION INTRAMUSCULAR; INTRAVENOUS
Status: COMPLETED | OUTPATIENT
Start: 2017-05-01 | End: 2017-05-01

## 2017-05-01 RX ORDER — SODIUM CHLORIDE 9 MG/ML
30 INJECTION, SOLUTION INTRAVENOUS ONCE
Status: COMPLETED | OUTPATIENT
Start: 2017-05-01 | End: 2017-05-02

## 2017-05-01 RX ADMIN — Medication 2 MG: at 22:35

## 2017-05-01 RX ADMIN — SODIUM CHLORIDE 1000 ML: 900 INJECTION, SOLUTION INTRAVENOUS at 22:57

## 2017-05-01 RX ADMIN — SODIUM CHLORIDE 2859 ML: 9 INJECTION, SOLUTION INTRAVENOUS at 23:33

## 2017-05-01 RX ADMIN — VANCOMYCIN HYDROCHLORIDE 2000 MG: 10 INJECTION, POWDER, LYOPHILIZED, FOR SOLUTION INTRAVENOUS at 23:00

## 2017-05-01 RX ADMIN — CEFTRIAXONE SODIUM 2 G: 2 INJECTION, POWDER, FOR SOLUTION INTRAMUSCULAR; INTRAVENOUS at 22:39

## 2017-05-01 NOTE — LETTER
NOTIFICATION RETURN TO WORK / SCHOOL 
 
5/2/2017 1:52 AM 
 
Ms. Mallory Santoro Roots [address] 
[address] To Whom It May Concern: 
 
Diane Duke accompanied family member while at [facility] EMERGENCY DEPT. She will return to work/school on 5/03/2017 If there are questions or concerns please have the patient contact our office. Sincerely, Felicia Prado

## 2017-05-01 NOTE — IP AVS SNAPSHOT
[address] [address] [facility] Patient: Jo Thomas MRN: [id number] WFA:[id number] You are allergic to the following Allergen Reactions Moxifloxacin Rash Pcn (Penicillins) Swelling Sulfa (Sulfonamide Antibiotics) Swelling Recent Documentation Height Weight BMI OB Status Smoking Status 1.651 m 95.3 kg 34.95 kg/m2 Postmenopausal Former Smoker Unresulted Labs Order Current Status CYCLIC CITRUL PEPTIDE AB, IGG In process Emergency Contacts Name Discharge Info Relation Home Work Mobile Rey Sinha DISCHARGE CAREGIVER [3] Brother [24] [phone] Love Sinha DISCHARGE CAREGIVER [3] Daughter [21] [phone] Love Sinha  Child [2] [phone] About your hospitalization You were admitted on:  May 2, 2017 You last received care in the:  [facility] [address] You were discharged on:  May 10, 2017 Unit phone number:  [phone] Why you were hospitalized Your primary diagnosis was:  Sirs (Systemic Inflammatory Response Syndrome) (Hcc) Your diagnoses also included:  Cellulitis Of Right Forearm, Olecranon Bursitis Of Right Elbow, Contusion Of Left Elbow, Intravenous Drug User, Right Achilles Tendinitis, History Of Penicillin Allergy, Type 2 Diabetes With Stage 3 Chronic Kidney Disease Gfr 30-59 (Hcc), 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, Hypokalemia, Mixed Connective Tissue Disease (Hcc) Providers Seen During Your Hospitalizations Provider Role Specialty Primary office phone Titus Cheema MD Attending Provider Emergency Medicine [phone] Marisol Casanova MD Attending Provider Internal Medicine [phone] Shama Arshad MD Attending Provider Internal Medicine [phone] Lidia Gilbert MD Attending Provider Internal Medicine [phone] Your Primary Care Physician (PCP) Primary Care Physician Office Phone Office Fax Aryan Rodriguez [phone] Follow-up Information Follow up With Details Comments Contact Info Jaguar Jarrett MD On 5/11/2017 @2:15PM [address] [facility] [place] [address] 
[phone] Your Appointments Thursday May 11, 2017  9:45 AM EDT Follow Up with Rebecca Bonner MD  
[address] ([facility]) [address] [place] [address], [address] [address]  
[phone] Current Discharge Medication List  
  
START taking these medications Dose & Instructions Dispensing Information Comments Morning Noon Evening Bedtime  
 clindamycin 300 mg capsule Commonly known as:  CLEOCIN Your last dose was: Your next dose is:    
   
   
 Dose:  300 mg Take 1 Cap by mouth every six (6) hours for 3 days. Quantity:  12 Cap Refills:  0  
     
   
   
   
  
 diclofenac 1 % Gel Commonly known as:  VOLTAREN Your last dose was: Your next dose is:    
   
   
 Dose:  2 g Apply 2 g to affected area four (4) times daily as needed for Pain. APPLY TO elbows/wrists/knees/ankles Quantity:  100 g Refills:  0  
     
   
   
   
  
 famotidine 20 mg tablet Commonly known as:  PEPCID Your last dose was: Your next dose is:    
   
   
 Dose:  20 mg Take 1 Tab by mouth nightly. Quantity:  30 Tab Refills:  0  
     
   
   
   
  
 heparin (porcine) 5,000 unit/mL injection Your last dose was: Your next dose is:    
   
   
 Dose:  5000 Units 1 mL by SubCUTAneous route every eight (8) hours for 14 days. Quantity:  42 mL Refills:  0  
     
   
   
   
  
 insulin lispro 100 unit/mL injection Commonly known as:  HUMALOG Your last dose was: Your next dose is: For Blood Sugar of: 150-169 = 3 Units; 170-189=4 Units 190-209 = 5 Units; 210-229 = 6 Units 230-249 = 7 Units; 330-454 = 8 Units 270-289 = 9 Units; 290-309 = 10 Units 310-329 = 11 Units; 330-349 = 12 Units 350 or  > = Call MD.  Indications: type 2 diabetes mellitus Quantity:  1 Vial  
Refills:  0  
     
   
   
   
  
 senna-docusate 8.6-50 mg per tablet Commonly known as:  Ronita Led Your last dose was: Your next dose is:    
   
   
 Dose:  1 Tab Take 1 Tab by mouth daily. Quantity:  30 Tab Refills:  0 CONTINUE these medications which have CHANGED Dose & Instructions Dispensing Information Comments Morning Noon Evening Bedtime  
 eplerenone 25 mg tablet Commonly known as:  Jolly Rakes Start taking on:  5/14/2017 What changed:  how much to take Your last dose was: Your next dose is:    
   
   
 Dose:  25 mg Take 1 Tab by mouth daily. Quantity:  30 Tab Refills:  0  
     
   
   
   
  
 ergocalciferol 50,000 unit capsule Commonly known as:  VITAMIN D2 What changed:  when to take this Your last dose was: Your next dose is:    
   
   
 Dose:  [address] Units Take 1 Cap by mouth every seven (7) days. Quantity:  4 Cap Refills:  0  
     
   
   
   
  
 insulin glargine 100 unit/mL injection Commonly known as:  LANTUS What changed:  additional instructions Your last dose was: Your next dose is:    
   
   
 65 units subcutaneously every night Quantity:  1 Vial  
Refills:  0  
     
   
   
   
  
 potassium chloride 20 mEq packet Commonly known as:  KLOR-CON What changed:  when to take this Your last dose was: Your next dose is:    
   
   
 Dose:  20 mEq Take 1 Packet by mouth Every Mon, Wed & Sun.  
 Quantity:  30 Each Refills:  0 CONTINUE these medications which have NOT CHANGED Dose & Instructions Dispensing Information Comments Morning Noon Evening Bedtime ABILIFY 5 mg tablet Generic drug:  ARIPiprazole Your last dose was: Your next dose is:    
   
   
 Dose:  10 mg Take 10 mg by mouth daily. Refills:  0  
 take 2 tabs each morning * albuterol 2.5 mg /3 mL (0.083 %) nebulizer solution Commonly known as:  PROVENTIL VENTOLIN Your last dose was: Your next dose is:    
   
   
 USE 1 NEB EVERY 4 HOURS FOR WHEEZING AND SHORTNESS OF BREATH  Indications: CHRONIC OBSTRUCTIVE PULMONARY DISEASE Quantity:  2 Package Refills:  3  
     
   
   
   
  
 * VENTOLIN HFA 90 mcg/actuation inhaler Generic drug:  albuterol Your last dose was: Your next dose is:    
   
   
 INHALE 2 PUFFS EVERY 4 HOURS AS NEEDED Quantity:  1 Inhaler Refills:  4  
     
   
   
   
  
 aspirin 81 mg tablet Your last dose was: Your next dose is:    
   
   
 Dose:  81 mg Take 81 mg by mouth daily. Refills:  0  
     
   
   
   
  
 DEPAKOTE 250 mg tablet Generic drug:  divalproex DR Your last dose was: Your next dose is:    
   
   
 Dose:  250 mg Take 250 mg by mouth nightly. Refills:  0  
     
   
   
   
  
 furosemide 20 mg tablet Commonly known as:  LASIX Your last dose was: Your next dose is:    
   
   
 Dose:  20 mg Take 1 Tab by mouth daily. Quantity:  30 Tab Refills:  0  
     
   
   
   
  
 insulin syringe,safetyneedle 1 mL 30 gauge x 5/16\" Syrg Your last dose was: Your next dose is: As directed Quantity:  50 Each Refills:  0 METANX 2.8-2-25 mg Tab Generic drug:  l-methylfolate-vit b12-vit b6 Your last dose was: Your next dose is: Take  by mouth. Refills:  0 Nebulizer Accessories Kit Your last dose was: Your next dose is: Use with nebulizer machine Quantity:  1 Kit Refills:  prn NEURONTIN 300 mg capsule Generic drug:  gabapentin Your last dose was: Your next dose is:    
   
   
 Dose:  300 mg Take 300 mg by mouth three (3) times daily. Refills:  0  
     
   
   
   
  
 oxyCODONE-acetaminophen 5-325 mg per tablet Commonly known as:  PERCOCET Your last dose was: Your next dose is:    
   
   
 Dose:  1 Tab Take 1 Tab by mouth every eight (8) hours as needed for Pain. Max Daily Amount: 3 Tabs. Quantity:  15 Tab Refills:  0 Oxygen-Air Delivery Systems Your last dose was: Your next dose is:    
   
   
 by Nasal route continuous. 2 LNC Refills:  0  
     
   
   
   
  
 rosuvastatin 5 mg tablet Commonly known as:  CRESTOR Your last dose was: Your next dose is:    
   
   
 Dose:  5 mg Take 1 Tab by mouth nightly. Quantity:  30 Tab Refills:  6 SYNTHROID 100 mcg tablet Generic drug:  levothyroxine Your last dose was: Your next dose is: Take  by mouth Daily (before breakfast). Refills:  0  
     
   
   
   
  
 traZODone 100 mg tablet Commonly known as:  Alysa Perkins Your last dose was: Your next dose is:    
   
   
 Dose:  100 mg Take 100 mg by mouth nightly. Refills:  0  
     
   
   
   
  
 TYLENOL 325 mg tablet Generic drug:  acetaminophen Your last dose was: Your next dose is:    
   
   
 Dose:  325 mg Take 325 mg by mouth every six (6) hours as needed for Pain. Refills:  0  
     
   
   
   
  
 umeclidinium-vilanterol 62.5-25 mcg/actuation inhaler Commonly known as:  Wendy Tinsley Your last dose was: Your next dose is:    
   
   
 Dose:  1 Puff Take 1 Puff by inhalation daily. Quantity:  1 Inhaler Refills:  5 ZOLOFT 100 mg tablet Generic drug:  sertraline Your last dose was: Your next dose is:    
   
   
 Dose:  50 mg Take 50 mg by mouth daily. Refills:  0  
     
   
   
   
  
 * Notice: This list has 2 medication(s) that are the same as other medications prescribed for you. Read the directions carefully, and ask your doctor or other care provider to review them with you. STOP taking these medications   
 clopidogrel 75 mg Tab Commonly known as:  PLAVIX  
   
  
 insulin CONCENTRATED regular 500 unit/mL Soln Commonly known as:  U-500 CONCENTRATED  
   
  
 metOLazone 5 mg tablet Commonly known as:  Al Ebbing Where to Get Your Medications Information on where to get these meds will be given to you by the nurse or doctor. ! Ask your nurse or doctor about these medications  
  clindamycin 300 mg capsule  
 diclofenac 1 % Gel  
 eplerenone 25 mg tablet  
 ergocalciferol 50,000 unit capsule  
 famotidine 20 mg tablet  
 heparin (porcine) 5,000 unit/mL injection  
 insulin glargine 100 unit/mL injection  
 insulin lispro 100 unit/mL injection  
 potassium chloride 20 mEq packet  
 senna-docusate 8.6-50 mg per tablet Discharge Instructions DISCHARGE SUMMARY from Nurse The following personal items are in your possession at time of discharge: 
 
Dental Appliances: Uppers Visual Aid: None Home Medications: None Jewelry: None Clothing: At bedside Other Valuables: None PATIENT INSTRUCTIONS: 
 
 
F-face looks uneven A-arms unable to move or move unevenly S-speech slurred or non-existent T-time-call 911 as soon as signs and symptoms begin-DO NOT go Back to bed or wait to see if you get better-TIME IS BRAIN. Warning Signs of HEART ATTACK Call 911 if you have these symptoms: 
? Chest discomfort.  Most heart attacks involve discomfort in the center of the chest that lasts more than a few minutes, or that goes away and comes back. It can feel like uncomfortable pressure, squeezing, fullness, or pain. ? Discomfort in other areas of the upper body. Symptoms can include pain or discomfort in one or both arms, the back, neck, jaw, or stomach. ? Shortness of breath with or without chest discomfort. ? Other signs may include breaking out in a cold sweat, nausea, or lightheadedness. Don't wait more than five minutes to call [address]! Fast action can save your life. Calling 911 is almost always the fastest way to get lifesaving treatment. Emergency Medical Services staff can begin treatment when they arrive  up to an hour sooner than if someone gets to the hospital by car. The discharge information has been reviewed with the patient. The patient verbalized understanding. Discharge medications reviewed with the patient and appropriate educational materials and side effects teaching were provided. Patient armband removed and shredded Discharge Instructions Attachments/References CELLULITIS (ENGLISH) BURSITIS (ENGLISH) Discharge Orders None [employer] Announcement We are excited to announce that we are making your provider's discharge notes available to you in [employer]. You will see these notes when they are completed and signed by the physician that discharged you from your recent hospital stay. If you have any questions or concerns about any information you see in [employer], please call the Health Information Department where you were seen or reach out to your Primary Care Provider for more information about your plan of care. General Information Please provide this summary of care documentation to your next provider. Patient Signature:  ____________________________________________________________ Date:  ____________________________________________________________ `    
    
 Provider Signature:  ____________________________________________________________ Date:  ____________________________________________________________ More Information Cellulitis: Care Instructions Your Care Instructions Cellulitis is a skin infection. It often occurs after a break in the skin from a scrape, cut, bite, or puncture, or after a rash. The doctor has checked you carefully, but problems can develop later. If you notice any problems or new symptoms, get medical treatment right away. Follow-up care is a key part of your treatment and safety. Be sure to make and go to all appointments, and call your doctor if you are having problems. It's also a good idea to know your test results and keep a list of the medicines you take. How can you care for yourself at home? · Take your antibiotics as directed. Do not stop taking them just because you feel better. You need to take the full course of antibiotics. · Prop up the infected area on pillows to reduce pain and swelling. Try to keep the area above the level of your heart as often as you can. · If your doctor told you how to care for your wound, follow your doctor's instructions. If you did not get instructions, follow this general advice: ¨ Wash the wound with clean water 2 times a day. Don't use hydrogen peroxide or alcohol, which can slow healing. ¨ You may cover the wound with a thin layer of petroleum jelly, such as Vaseline, and a nonstick bandage. ¨ Apply more petroleum jelly and replace the bandage as needed. · Be safe with medicines. Take pain medicines exactly as directed. ¨ If the doctor gave you a prescription medicine for pain, take it as prescribed. ¨ If you are not taking a prescription pain medicine, ask your doctor if you can take an over-the-counter medicine. To prevent cellulitis in the future · Try to prevent cuts, scrapes, or other injuries to your skin.  Cellulitis most often occurs where there is a break in the skin. · If you get a scrape, cut, mild burn, or bite, wash the wound with clean water as soon as you can to help avoid infection. Don't use hydrogen peroxide or alcohol, which can slow healing. · If you have swelling in your legs (edema), support stockings and good skin care may help prevent leg sores and cellulitis. · Take care of your feet, especially if you have diabetes or other conditions that increase the risk of infection. Wear shoes and socks. Do not go barefoot. If you have athlete's foot or other skin problems on your feet, talk to your doctor about how to treat them. When should you call for help? Call your doctor now or seek immediate medical care if: 
· You have signs that your infection is getting worse, such as: 
¨ Increased pain, swelling, warmth, or redness. ¨ Red streaks leading from the area. ¨ Pus draining from the area. ¨ A fever. · You get a rash. Watch closely for changes in your health, and be sure to contact your doctor if: 
· You are not getting better after 1 day (24 hours). · You do not get better as expected. Where can you learn more? Go to http://rick-kathya.info/. Wanna Dakin in the search box to learn more about \"Cellulitis: Care Instructions. \" Current as of: October 13, 2016 Content Version: 11.2 © 5756-4658 [employer]. Care instructions adapted under license by [employer] (which disclaims liability or warranty for this information). If you have questions about a medical condition or this instruction, always ask your healthcare professional. [address] any warranty or liability for your use of this information. Bursitis: Care Instructions Your Care Instructions A bursa is a small sac of fluid that helps the tissues around a joint slide over one another easily.  Injury or overuse of a joint can cause pain, redness, and inflammation in the bursa (bursitis). Bursitis usually gets better if you avoid the activity that caused it. You can help prevent bursitis from coming back by doing stretching and strengthening exercises. You may also need to change the way you do some activities. Follow-up care is a key part of your treatment and safety. Be sure to make and go to all appointments, and call your doctor if you are having problems. Its also a good idea to know your test results and keep a list of the medicines you take. How can you care for yourself at home? · Put ice or a cold pack on the area for 10 to 20 minutes at a time. Try to do this every 1 to 2 hours for the next 3 days (when you are awake) or until the swelling goes down. Put a thin cloth between the ice and your skin. · After the 3 days of using ice, you may use heat on the area. You can use a hot water bottle; a warm, moist towel; or a heating pad set on low. You can also try alternating heat and ice. · Rest the area where you have pain. Stop any activities that cause pain. Switch to activities that do not stress the area. · Take pain medicines exactly as directed. ¨ If the doctor gave you a prescription medicine for pain, take it as prescribed. ¨ If you are not taking a prescription pain medicine, ask your doctor if you can take an over-the-counter medicine. ¨ Do not take two or more pain medicines at the same time unless the doctor told you to. Many pain medicines have acetaminophen, which is Tylenol. Too much acetaminophen (Tylenol) can be harmful. · To prevent stiffness, gently move the joint as much as you can without pain every day. As the pain gets better, keep doing range-of-motion exercises. Ask your doctor for exercises that will make the muscles around the joint stronger. Do these as directed.  
· You can slowly return to the activity that caused the pain, but do it with less effort until you can do it without pain or swelling. Be sure to warm up before and stretch after you do the activity. When should you call for help? Call your doctor now or seek immediate medical care if: 
· You get a fever and chills. · You have increased swelling or redness in a joint. · You cannot use a joint, or the pain in a joint gets worse. Watch closely for changes in your health, and be sure to contact your doctor if: 
· You have pain for 2 weeks or longer despite home treatment. Where can you learn more? Go to http://rick-kathya.info/. Enter X225 in the search box to learn more about \"Bursitis: Care Instructions. \" Current as of: May 23, 2016 Content Version: 11.2 © 4391-1902 [employer]. Care instructions adapted under license by [employer] (which disclaims liability or warranty for this information). If you have questions about a medical condition or this instruction, always ask your healthcare professional. [address] any warranty or liability for your use of this information.

## 2017-05-01 NOTE — IP AVS SNAPSHOT
Current Discharge Medication List  
  
START taking these medications Dose & Instructions Dispensing Information Comments Morning Noon Evening Bedtime  
 clindamycin 300 mg capsule Commonly known as:  CLEOCIN Your last dose was: Your next dose is:    
   
   
 Dose:  300 mg Take 1 Cap by mouth every six (6) hours for 3 days. Quantity:  12 Cap Refills:  0  
     
   
   
   
  
 diclofenac 1 % Gel Commonly known as:  VOLTAREN Your last dose was: Your next dose is:    
   
   
 Dose:  2 g Apply 2 g to affected area four (4) times daily as needed for Pain. APPLY TO elbows/wrists/knees/ankles Quantity:  100 g Refills:  0  
     
   
   
   
  
 famotidine 20 mg tablet Commonly known as:  PEPCID Your last dose was: Your next dose is:    
   
   
 Dose:  20 mg Take 1 Tab by mouth nightly. Quantity:  30 Tab Refills:  0  
     
   
   
   
  
 heparin (porcine) 5,000 unit/mL injection Your last dose was: Your next dose is:    
   
   
 Dose:  5000 Units 1 mL by SubCUTAneous route every eight (8) hours for 14 days. Quantity:  42 mL Refills:  0  
     
   
   
   
  
 insulin lispro 100 unit/mL injection Commonly known as:  HUMALOG Your last dose was: Your next dose is: For Blood Sugar of: 150-169 = 3 Units; 170-189=4 Units 190-209 = 5 Units; 210-229 = 6 Units 230-249 = 7 Units; 468-385 = 8 Units 270-289 = 9 Units; 290-309 = 10 Units 310-329 = 11 Units; 330-349 = 12 Units 350 or  > = Call MD.  Indications: type 2 diabetes mellitus Quantity:  1 Vial  
Refills:  0  
     
   
   
   
  
 senna-docusate 8.6-50 mg per tablet Commonly known as:  Lysmary Ashton Your last dose was: Your next dose is:    
   
   
 Dose:  1 Tab Take 1 Tab by mouth daily. Quantity:  30 Tab Refills:  0 CONTINUE these medications which have CHANGED Dose & Instructions Dispensing Information Comments Morning Noon Evening Bedtime  
 eplerenone 25 mg tablet Commonly known as:  [place] Isidoro Start taking on:  5/14/2017 What changed:  how much to take Your last dose was: Your next dose is:    
   
   
 Dose:  25 mg Take 1 Tab by mouth daily. Quantity:  30 Tab Refills:  0  
     
   
   
   
  
 ergocalciferol 50,000 unit capsule Commonly known as:  VITAMIN D2 What changed:  when to take this Your last dose was: Your next dose is:    
   
   
 Dose:  20926 Units Take 1 Cap by mouth every seven (7) days. Quantity:  4 Cap Refills:  0  
     
   
   
   
  
 insulin glargine 100 unit/mL injection Commonly known as:  LANTUS What changed:  additional instructions Your last dose was: Your next dose is:    
   
   
 65 units subcutaneously every night Quantity:  1 Vial  
Refills:  0  
     
   
   
   
  
 potassium chloride 20 mEq packet Commonly known as:  KLOR-CON What changed:  when to take this Your last dose was: Your next dose is:    
   
   
 Dose:  20 mEq Take 1 Packet by mouth Every Mon, Wed & Sun.  
 Quantity:  30 Each Refills:  0 CONTINUE these medications which have NOT CHANGED Dose & Instructions Dispensing Information Comments Morning Noon Evening Bedtime ABILIFY 5 mg tablet Generic drug:  ARIPiprazole Your last dose was: Your next dose is:    
   
   
 Dose:  10 mg Take 10 mg by mouth daily. Refills:  0  
 take 2 tabs each morning * albuterol 2.5 mg /3 mL (0.083 %) nebulizer solution Commonly known as:  PROVENTIL VENTOLIN Your last dose was: Your next dose is:    
   
   
 USE 1 NEB EVERY 4 HOURS FOR WHEEZING AND SHORTNESS OF BREATH  Indications: CHRONIC OBSTRUCTIVE PULMONARY DISEASE Quantity:  2 Package Refills:  3 * VENTOLIN HFA 90 mcg/actuation inhaler Generic drug:  albuterol Your last dose was: Your next dose is:    
   
   
 INHALE 2 PUFFS EVERY 4 HOURS AS NEEDED Quantity:  1 Inhaler Refills:  4  
     
   
   
   
  
 aspirin 81 mg tablet Your last dose was: Your next dose is:    
   
   
 Dose:  81 mg Take 81 mg by mouth daily. Refills:  0  
     
   
   
   
  
 DEPAKOTE 250 mg tablet Generic drug:  divalproex DR Your last dose was: Your next dose is:    
   
   
 Dose:  250 mg Take 250 mg by mouth nightly. Refills:  0  
     
   
   
   
  
 furosemide 20 mg tablet Commonly known as:  LASIX Your last dose was: Your next dose is:    
   
   
 Dose:  20 mg Take 1 Tab by mouth daily. Quantity:  30 Tab Refills:  0  
     
   
   
   
  
 insulin syringe,safetyneedle 1 mL 30 gauge x 5/16\" Syrg Your last dose was: Your next dose is: As directed Quantity:  50 Each Refills:  0 METANX 2.8-2-25 mg Tab Generic drug:  l-methylfolate-vit b12-vit b6 Your last dose was: Your next dose is: Take  by mouth. Refills:  0 Nebulizer Accessories Kit Your last dose was: Your next dose is:    
   
   
 Use with nebulizer machine Quantity:  1 Kit Refills:  prn NEURONTIN 300 mg capsule Generic drug:  gabapentin Your last dose was: Your next dose is:    
   
   
 Dose:  300 mg Take 300 mg by mouth three (3) times daily. Refills:  0  
     
   
   
   
  
 oxyCODONE-acetaminophen 5-325 mg per tablet Commonly known as:  PERCOCET Your last dose was: Your next dose is:    
   
   
 Dose:  1 Tab Take 1 Tab by mouth every eight (8) hours as needed for Pain. Max Daily Amount: 3 Tabs. Quantity:  15 Tab Refills:  0 Oxygen-Air Delivery Systems Your last dose was: Your next dose is:    
   
   
 by Nasal route continuous. 2 LNC Refills:  0  
     
   
   
   
  
 rosuvastatin 5 mg tablet Commonly known as:  CRESTOR Your last dose was: Your next dose is:    
   
   
 Dose:  5 mg Take 1 Tab by mouth nightly. Quantity:  30 Tab Refills:  6 SYNTHROID 100 mcg tablet Generic drug:  levothyroxine Your last dose was: Your next dose is: Take  by mouth Daily (before breakfast). Refills:  0  
     
   
   
   
  
 traZODone 100 mg tablet Commonly known as:  Fredrick Avalos Your last dose was: Your next dose is:    
   
   
 Dose:  100 mg Take 100 mg by mouth nightly. Refills:  0  
     
   
   
   
  
 TYLENOL 325 mg tablet Generic drug:  acetaminophen Your last dose was: Your next dose is:    
   
   
 Dose:  325 mg Take 325 mg by mouth every six (6) hours as needed for Pain. Refills:  0  
     
   
   
   
  
 umeclidinium-vilanterol 62.5-25 mcg/actuation inhaler Commonly known as:  Eletha Rubia Your last dose was: Your next dose is:    
   
   
 Dose:  1 Puff Take 1 Puff by inhalation daily. Quantity:  1 Inhaler Refills:  5 ZOLOFT 100 mg tablet Generic drug:  sertraline Your last dose was: Your next dose is:    
   
   
 Dose:  50 mg Take 50 mg by mouth daily. Refills:  0  
     
   
   
   
  
 * Notice: This list has 2 medication(s) that are the same as other medications prescribed for you. Read the directions carefully, and ask your doctor or other care provider to review them with you. STOP taking these medications   
 clopidogrel 75 mg Tab Commonly known as:  PLAVIX  
   
  
 insulin CONCENTRATED regular 500 unit/mL Soln Commonly known as:  U-500 CONCENTRATED  
   
  
 metOLazone 5 mg tablet Commonly known as:  Shantel Armando  
   
  
  
  
 Where to Get Your Medications Information on where to get these meds will be given to you by the nurse or doctor. ! Ask your nurse or doctor about these medications  
  clindamycin 300 mg capsule  
 diclofenac 1 % Gel  
 eplerenone 25 mg tablet  
 ergocalciferol 50,000 unit capsule  
 famotidine 20 mg tablet  
 heparin (porcine) 5,000 unit/mL injection  
 insulin glargine 100 unit/mL injection  
 insulin lispro 100 unit/mL injection  
 potassium chloride 20 mEq packet  
 senna-docusate 8.6-50 mg per tablet

## 2017-05-02 ENCOUNTER — APPOINTMENT (OUTPATIENT)
Dept: GENERAL RADIOLOGY | Age: 62
DRG: 383 | End: 2017-05-02
Attending: PHYSICIAN ASSISTANT
Payer: MEDICAID

## 2017-05-02 PROBLEM — R65.10 SIRS (SYSTEMIC INFLAMMATORY RESPONSE SYNDROME) (HCC): Status: ACTIVE | Noted: 2017-05-02

## 2017-05-02 LAB
ANION GAP BLD CALC-SCNC: 8 MMOL/L (ref 3–18)
ANION GAP BLD CALC-SCNC: 9 MMOL/L (ref 3–18)
APPEARANCE UR: CLEAR
BASOPHILS # BLD AUTO: 0 K/UL (ref 0–0.06)
BASOPHILS # BLD AUTO: 0 K/UL (ref 0–0.06)
BASOPHILS # BLD: 0 % (ref 0–2)
BASOPHILS # BLD: 0 % (ref 0–2)
BILIRUB UR QL: NEGATIVE
BUN SERPL-MCNC: 15 MG/DL (ref 7–18)
BUN SERPL-MCNC: 18 MG/DL (ref 7–18)
BUN/CREAT SERPL: 14 (ref 12–20)
BUN/CREAT SERPL: 17 (ref 12–20)
CALCIUM SERPL-MCNC: 7.8 MG/DL (ref 8.5–10.1)
CALCIUM SERPL-MCNC: 8 MG/DL (ref 8.5–10.1)
CHLORIDE SERPL-SCNC: 101 MMOL/L (ref 100–108)
CHLORIDE SERPL-SCNC: 103 MMOL/L (ref 100–108)
CO2 SERPL-SCNC: 27 MMOL/L (ref 21–32)
CO2 SERPL-SCNC: 27 MMOL/L (ref 21–32)
COLOR UR: ABNORMAL
CREAT SERPL-MCNC: 1.07 MG/DL (ref 0.6–1.3)
CREAT SERPL-MCNC: 1.07 MG/DL (ref 0.6–1.3)
DIFFERENTIAL METHOD BLD: ABNORMAL
DIFFERENTIAL METHOD BLD: ABNORMAL
EOSINOPHIL # BLD: 0.1 K/UL (ref 0–0.4)
EOSINOPHIL # BLD: 0.1 K/UL (ref 0–0.4)
EOSINOPHIL NFR BLD: 1 % (ref 0–5)
EOSINOPHIL NFR BLD: 1 % (ref 0–5)
ERYTHROCYTE [DISTWIDTH] IN BLOOD BY AUTOMATED COUNT: 13.9 % (ref 11.6–14.5)
ERYTHROCYTE [DISTWIDTH] IN BLOOD BY AUTOMATED COUNT: 13.9 % (ref 11.6–14.5)
GLUCOSE BLD STRIP.AUTO-MCNC: 223 MG/DL (ref 70–110)
GLUCOSE BLD STRIP.AUTO-MCNC: 237 MG/DL (ref 70–110)
GLUCOSE SERPL-MCNC: 197 MG/DL (ref 74–99)
GLUCOSE SERPL-MCNC: 199 MG/DL (ref 74–99)
GLUCOSE UR STRIP.AUTO-MCNC: NEGATIVE MG/DL
HCT VFR BLD AUTO: 33.3 % (ref 35–45)
HCT VFR BLD AUTO: 33.9 % (ref 35–45)
HGB BLD-MCNC: 10.9 G/DL (ref 12–16)
HGB BLD-MCNC: 11.3 G/DL (ref 12–16)
HGB UR QL STRIP: NEGATIVE
KETONES UR QL STRIP.AUTO: ABNORMAL MG/DL
LACTATE BLD-SCNC: 0.8 MMOL/L (ref 0.4–2)
LEUKOCYTE ESTERASE UR QL STRIP.AUTO: NEGATIVE
LYMPHOCYTES # BLD AUTO: 12 % (ref 21–52)
LYMPHOCYTES # BLD AUTO: 17 % (ref 21–52)
LYMPHOCYTES # BLD: 1.4 K/UL (ref 0.9–3.6)
LYMPHOCYTES # BLD: 1.8 K/UL (ref 0.9–3.6)
MCH RBC QN AUTO: 26.3 PG (ref 24–34)
MCH RBC QN AUTO: 26.9 PG (ref 24–34)
MCHC RBC AUTO-ENTMCNC: 32.7 G/DL (ref 31–37)
MCHC RBC AUTO-ENTMCNC: 33.3 G/DL (ref 31–37)
MCV RBC AUTO: 80.4 FL (ref 74–97)
MCV RBC AUTO: 80.7 FL (ref 74–97)
MONOCYTES # BLD: 1.1 K/UL (ref 0.05–1.2)
MONOCYTES # BLD: 1.4 K/UL (ref 0.05–1.2)
MONOCYTES NFR BLD AUTO: 11 % (ref 3–10)
MONOCYTES NFR BLD AUTO: 12 % (ref 3–10)
NEUTS SEG # BLD: 7.2 K/UL (ref 1.8–8)
NEUTS SEG # BLD: 9.2 K/UL (ref 1.8–8)
NEUTS SEG NFR BLD AUTO: 71 % (ref 40–73)
NEUTS SEG NFR BLD AUTO: 75 % (ref 40–73)
NITRITE UR QL STRIP.AUTO: NEGATIVE
PH UR STRIP: 5 [PH] (ref 5–8)
PLATELET # BLD AUTO: 116 K/UL (ref 135–420)
PLATELET # BLD AUTO: 119 K/UL (ref 135–420)
PMV BLD AUTO: 10.1 FL (ref 9.2–11.8)
PMV BLD AUTO: 9.8 FL (ref 9.2–11.8)
POTASSIUM SERPL-SCNC: 3.5 MMOL/L (ref 3.5–5.5)
POTASSIUM SERPL-SCNC: 3.5 MMOL/L (ref 3.5–5.5)
PROT UR STRIP-MCNC: NEGATIVE MG/DL
RBC # BLD AUTO: 4.14 M/UL (ref 4.2–5.3)
RBC # BLD AUTO: 4.2 M/UL (ref 4.2–5.3)
SODIUM SERPL-SCNC: 137 MMOL/L (ref 136–145)
SODIUM SERPL-SCNC: 138 MMOL/L (ref 136–145)
SP GR UR REFRACTOMETRY: 1.02 (ref 1–1.03)
UROBILINOGEN UR QL STRIP.AUTO: 1 EU/DL (ref 0.2–1)
WBC # BLD AUTO: 10.1 K/UL (ref 4.6–13.2)
WBC # BLD AUTO: 12.2 K/UL (ref 4.6–13.2)

## 2017-05-02 PROCEDURE — 65270000029 HC RM PRIVATE

## 2017-05-02 PROCEDURE — 77030011641 HC PASTE OST ADH BMS -A

## 2017-05-02 PROCEDURE — 81003 URINALYSIS AUTO W/O SCOPE: CPT | Performed by: PHYSICIAN ASSISTANT

## 2017-05-02 PROCEDURE — 83605 ASSAY OF LACTIC ACID: CPT

## 2017-05-02 PROCEDURE — 74011250636 HC RX REV CODE- 250/636: Performed by: EMERGENCY MEDICINE

## 2017-05-02 PROCEDURE — 71010 XR CHEST PORT: CPT

## 2017-05-02 PROCEDURE — 85025 COMPLETE CBC W/AUTO DIFF WBC: CPT | Performed by: INTERNAL MEDICINE

## 2017-05-02 PROCEDURE — 74011250636 HC RX REV CODE- 250/636: Performed by: PHYSICIAN ASSISTANT

## 2017-05-02 PROCEDURE — 74011000250 HC RX REV CODE- 250: Performed by: INTERNAL MEDICINE

## 2017-05-02 PROCEDURE — 80048 BASIC METABOLIC PNL TOTAL CA: CPT | Performed by: INTERNAL MEDICINE

## 2017-05-02 PROCEDURE — 74011250637 HC RX REV CODE- 250/637: Performed by: PHYSICIAN ASSISTANT

## 2017-05-02 PROCEDURE — 80048 BASIC METABOLIC PNL TOTAL CA: CPT | Performed by: EMERGENCY MEDICINE

## 2017-05-02 PROCEDURE — 74011250637 HC RX REV CODE- 250/637

## 2017-05-02 PROCEDURE — 74011636637 HC RX REV CODE- 636/637: Performed by: INTERNAL MEDICINE

## 2017-05-02 PROCEDURE — 74011250636 HC RX REV CODE- 250/636: Performed by: INTERNAL MEDICINE

## 2017-05-02 PROCEDURE — 74011250637 HC RX REV CODE- 250/637: Performed by: INTERNAL MEDICINE

## 2017-05-02 PROCEDURE — 74011000258 HC RX REV CODE- 258: Performed by: INTERNAL MEDICINE

## 2017-05-02 PROCEDURE — 77030012861 HC BG OSTMY KT BMS -A

## 2017-05-02 PROCEDURE — 77030010520

## 2017-05-02 PROCEDURE — 82962 GLUCOSE BLOOD TEST: CPT

## 2017-05-02 PROCEDURE — 74011250636 HC RX REV CODE- 250/636

## 2017-05-02 RX ORDER — ACETAMINOPHEN 325 MG/1
650 TABLET ORAL
Status: DISCONTINUED | OUTPATIENT
Start: 2017-05-02 | End: 2017-05-03 | Stop reason: SDUPTHER

## 2017-05-02 RX ORDER — GABAPENTIN 300 MG/1
300 CAPSULE ORAL 3 TIMES DAILY
Status: DISCONTINUED | OUTPATIENT
Start: 2017-05-02 | End: 2017-05-10 | Stop reason: HOSPADM

## 2017-05-02 RX ORDER — MORPHINE SULFATE 2 MG/ML
2 INJECTION, SOLUTION INTRAMUSCULAR; INTRAVENOUS
Status: DISCONTINUED | OUTPATIENT
Start: 2017-05-02 | End: 2017-05-06 | Stop reason: CLARIF

## 2017-05-02 RX ORDER — INSULIN LISPRO 100 [IU]/ML
INJECTION, SOLUTION INTRAVENOUS; SUBCUTANEOUS
Status: DISCONTINUED | OUTPATIENT
Start: 2017-05-03 | End: 2017-05-02

## 2017-05-02 RX ORDER — INSULIN LISPRO 100 [IU]/ML
INJECTION, SOLUTION INTRAVENOUS; SUBCUTANEOUS
Status: DISCONTINUED | OUTPATIENT
Start: 2017-05-03 | End: 2017-05-10 | Stop reason: HOSPADM

## 2017-05-02 RX ORDER — ACETAMINOPHEN 325 MG/1
TABLET ORAL
Status: DISPENSED
Start: 2017-05-02 | End: 2017-05-02

## 2017-05-02 RX ORDER — HYDROMORPHONE HYDROCHLORIDE 1 MG/ML
0.5 INJECTION, SOLUTION INTRAMUSCULAR; INTRAVENOUS; SUBCUTANEOUS
Status: DISCONTINUED | OUTPATIENT
Start: 2017-05-02 | End: 2017-05-02 | Stop reason: SDUPTHER

## 2017-05-02 RX ORDER — MORPHINE SULFATE 2 MG/ML
4 INJECTION, SOLUTION INTRAMUSCULAR; INTRAVENOUS
Status: DISCONTINUED | OUTPATIENT
Start: 2017-05-02 | End: 2017-05-02 | Stop reason: SDUPTHER

## 2017-05-02 RX ORDER — ALBUTEROL SULFATE 0.83 MG/ML
2.5 SOLUTION RESPIRATORY (INHALATION)
Status: DISCONTINUED | OUTPATIENT
Start: 2017-05-02 | End: 2017-05-03

## 2017-05-02 RX ORDER — TRAZODONE HYDROCHLORIDE 100 MG/1
100 TABLET ORAL
Status: DISCONTINUED | OUTPATIENT
Start: 2017-05-02 | End: 2017-05-10 | Stop reason: HOSPADM

## 2017-05-02 RX ORDER — EPLERENONE 25 MG/1
25 TABLET, FILM COATED ORAL DAILY
Status: DISCONTINUED | OUTPATIENT
Start: 2017-05-02 | End: 2017-05-09

## 2017-05-02 RX ORDER — ASPIRIN 81 MG/1
81 TABLET ORAL DAILY
Status: DISCONTINUED | OUTPATIENT
Start: 2017-05-02 | End: 2017-05-10 | Stop reason: HOSPADM

## 2017-05-02 RX ORDER — ARIPIPRAZOLE 10 MG/1
10 TABLET ORAL DAILY
Status: DISCONTINUED | OUTPATIENT
Start: 2017-05-02 | End: 2017-05-10 | Stop reason: HOSPADM

## 2017-05-02 RX ORDER — MORPHINE SULFATE 2 MG/ML
4 INJECTION, SOLUTION INTRAMUSCULAR; INTRAVENOUS
Status: DISCONTINUED | OUTPATIENT
Start: 2017-05-02 | End: 2017-05-02

## 2017-05-02 RX ORDER — HEPARIN SODIUM 5000 [USP'U]/ML
5000 INJECTION, SOLUTION INTRAVENOUS; SUBCUTANEOUS EVERY 8 HOURS
Status: DISCONTINUED | OUTPATIENT
Start: 2017-05-02 | End: 2017-05-10 | Stop reason: HOSPADM

## 2017-05-02 RX ORDER — SERTRALINE HYDROCHLORIDE 50 MG/1
50 TABLET, FILM COATED ORAL DAILY
Status: DISCONTINUED | OUTPATIENT
Start: 2017-05-02 | End: 2017-05-10 | Stop reason: HOSPADM

## 2017-05-02 RX ORDER — SODIUM CHLORIDE 0.9 % (FLUSH) 0.9 %
5-10 SYRINGE (ML) INJECTION AS NEEDED
Status: DISCONTINUED | OUTPATIENT
Start: 2017-05-02 | End: 2017-05-10 | Stop reason: HOSPADM

## 2017-05-02 RX ORDER — DIVALPROEX SODIUM 250 MG/1
250 TABLET, DELAYED RELEASE ORAL
Status: DISCONTINUED | OUTPATIENT
Start: 2017-05-02 | End: 2017-05-10 | Stop reason: HOSPADM

## 2017-05-02 RX ORDER — LEVOTHYROXINE SODIUM 100 UG/1
100 TABLET ORAL
Status: DISCONTINUED | OUTPATIENT
Start: 2017-05-02 | End: 2017-05-10 | Stop reason: HOSPADM

## 2017-05-02 RX ORDER — CLOPIDOGREL BISULFATE 75 MG/1
75 TABLET ORAL DAILY
Status: DISCONTINUED | OUTPATIENT
Start: 2017-05-02 | End: 2017-05-04

## 2017-05-02 RX ORDER — INSULIN GLARGINE 100 [IU]/ML
60 INJECTION, SOLUTION SUBCUTANEOUS
Status: DISCONTINUED | OUTPATIENT
Start: 2017-05-02 | End: 2017-05-08

## 2017-05-02 RX ORDER — MORPHINE SULFATE 4 MG/ML
INJECTION, SOLUTION INTRAMUSCULAR; INTRAVENOUS
Status: COMPLETED
Start: 2017-05-02 | End: 2017-05-02

## 2017-05-02 RX ORDER — ACETAMINOPHEN 325 MG/1
975 TABLET ORAL
Status: COMPLETED | OUTPATIENT
Start: 2017-05-02 | End: 2017-05-02

## 2017-05-02 RX ORDER — SODIUM CHLORIDE 0.9 % (FLUSH) 0.9 %
5-10 SYRINGE (ML) INJECTION EVERY 8 HOURS
Status: DISCONTINUED | OUTPATIENT
Start: 2017-05-02 | End: 2017-05-10 | Stop reason: HOSPADM

## 2017-05-02 RX ORDER — ROSUVASTATIN CALCIUM 5 MG/1
5 TABLET, COATED ORAL
Status: DISCONTINUED | OUTPATIENT
Start: 2017-05-02 | End: 2017-05-10 | Stop reason: HOSPADM

## 2017-05-02 RX ADMIN — AZTREONAM 1 G: 1 INJECTION, POWDER, LYOPHILIZED, FOR SOLUTION INTRAMUSCULAR; INTRAVENOUS at 10:47

## 2017-05-02 RX ADMIN — Medication 2 MG: at 19:15

## 2017-05-02 RX ADMIN — TRAZODONE HYDROCHLORIDE 100 MG: 100 TABLET ORAL at 22:43

## 2017-05-02 RX ADMIN — ALBUTEROL SULFATE 2.5 MG: 2.5 SOLUTION RESPIRATORY (INHALATION) at 07:10

## 2017-05-02 RX ADMIN — Medication 4 MG: at 01:44

## 2017-05-02 RX ADMIN — Medication 10 ML: at 14:00

## 2017-05-02 RX ADMIN — AZTREONAM 1 G: 1 INJECTION, POWDER, LYOPHILIZED, FOR SOLUTION INTRAMUSCULAR; INTRAVENOUS at 18:29

## 2017-05-02 RX ADMIN — HEPARIN SODIUM 5000 UNITS: 5000 INJECTION, SOLUTION INTRAVENOUS; SUBCUTANEOUS at 18:29

## 2017-05-02 RX ADMIN — GABAPENTIN 300 MG: 300 CAPSULE ORAL at 22:43

## 2017-05-02 RX ADMIN — DIVALPROEX SODIUM 250 MG: 250 TABLET, DELAYED RELEASE ORAL at 22:43

## 2017-05-02 RX ADMIN — CLOPIDOGREL BISULFATE 75 MG: 75 TABLET, FILM COATED ORAL at 09:56

## 2017-05-02 RX ADMIN — EPLERENONE 25 MG: 25 TABLET, FILM COATED ORAL at 09:56

## 2017-05-02 RX ADMIN — SERTRALINE HYDROCHLORIDE 50 MG: 50 TABLET ORAL at 09:56

## 2017-05-02 RX ADMIN — HYDROMORPHONE HYDROCHLORIDE 0.5 MG: 1 INJECTION, SOLUTION INTRAMUSCULAR; INTRAVENOUS; SUBCUTANEOUS at 07:06

## 2017-05-02 RX ADMIN — INSULIN GLARGINE 60 UNITS: 100 INJECTION, SOLUTION SUBCUTANEOUS at 23:24

## 2017-05-02 RX ADMIN — Medication 2 MG: at 22:45

## 2017-05-02 RX ADMIN — VANCOMYCIN HYDROCHLORIDE 1250 MG: 10 INJECTION, POWDER, LYOPHILIZED, FOR SOLUTION INTRAVENOUS at 19:15

## 2017-05-02 RX ADMIN — LEVOTHYROXINE SODIUM 100 MCG: 100 TABLET ORAL at 09:56

## 2017-05-02 RX ADMIN — ROSUVASTATIN CALCIUM 5 MG: 5 TABLET ORAL at 22:43

## 2017-05-02 RX ADMIN — ASPIRIN 81 MG: 81 TABLET, COATED ORAL at 09:56

## 2017-05-02 RX ADMIN — MORPHINE SULFATE 4 MG: 2 INJECTION, SOLUTION INTRAMUSCULAR; INTRAVENOUS at 12:00

## 2017-05-02 RX ADMIN — ARIPIPRAZOLE 10 MG: 10 TABLET ORAL at 09:56

## 2017-05-02 RX ADMIN — ACETAMINOPHEN 975 MG: 325 TABLET ORAL at 01:39

## 2017-05-02 RX ADMIN — HEPARIN SODIUM 5000 UNITS: 5000 INJECTION, SOLUTION INTRAVENOUS; SUBCUTANEOUS at 10:47

## 2017-05-02 RX ADMIN — GABAPENTIN 300 MG: 300 CAPSULE ORAL at 09:56

## 2017-05-02 RX ADMIN — MORPHINE SULFATE 4 MG: 2 INJECTION, SOLUTION INTRAMUSCULAR; INTRAVENOUS at 03:37

## 2017-05-02 RX ADMIN — GABAPENTIN 300 MG: 300 CAPSULE ORAL at 16:39

## 2017-05-02 NOTE — ED NOTES
Pt cleaned up after urine voided. Fresh linens, gown, and diaper in place. Pt provided w/ water and ginger ale.

## 2017-05-02 NOTE — H&P
History and Physical      NAME:  Chelsea Sinha   :   1955   MRN:   [id number]     Date/Time:  2017     CHIEF COMPLAINT: fall     HISTORY OF PRESENT ILLNESS:     Ms. Radha Jones is a 62 y.o.   female with a PMH of DM-2, COPD, HTN, Hep C, chronic diastolic CHF, bipolar d/o and IV drug abuse who presents with c/c of  Fall. Patient was recently admitted for increasing left hand and foot pain. She was found to have left foot and left hand cellulitis, abscess. Exact microbial etiology of infection not clear. Patient has been appropriately managed with I&D on . Patient was discharged on po levofloxacin for 7 days. Now she came back after a fall. Apparently she fell on Thursday and land on her left elbow. She is c/o left elbow pain and swelling. Here in ED she was found to have fever. She is also complaining of cough and SOB. Denies chest pain. No nausea, vomiting or diarrhea. Denies urinary complaints. Past Medical History:   Diagnosis Date    Anxiety     Asthma     Back injury     jumped out of second story window    Behavioral change     Bilateral knee pain     Bipolar disorder (HCC)     Chronic airway obstruction, not elsewhere classified     SOB, on paula O2 Recent admission with psychosis    Chronic back pain     Chronic diastolic heart failure (HCC)     Stable on diuretics    Chronic kidney disease     stage III    Congestive heart failure (HCC)     COPD (chronic obstructive pulmonary disease) ([facility] [address] 10/31/2012    Cramps, muscle, general     Depression     Diabetes mellitus     Difficulty speaking     Difficulty walking     Difficulty writing     DJD (degenerative joint disease) of knee     bilateral, mild    Edema     The edema involves both lower extremities. The episodes last for 1 week. The patient describes this as worsening. Symptoms are exacerbated by prolonged sitting. Symptoms are relieved by leg elevation and diuretics. NO CHANGE    Essential hypertension, benign     Better controlled    Falls frequently     Fatigue     Generalized stiffness     Headache     Heartburn     Hiatal hernia     History of hepatitis C     treated    Hoarseness of voice     Hypothyroidism     Lung disease     Memory difficulty     Morbid obesity (HCC)     Multiple joint pain     Nausea     Numbness and tingling     arms and legs, related to diabetic neuropathy    Osteoarthritis of left knee     Other and unspecified hyperlipidemia     LDL reportedly very high/started on crestor recently by diabetic doctor    Oxygen dependent     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     [facility]arcoidosis ([facility] [address]     Sickle cell trait ([facility] [address]     Sinusitis     SOB (shortness of breath)     STD (female)     herpes    Swelling of body region     legs and feet    Tinnitus     Venous insufficiency     Vertigo     Weakness generalized     Wears glasses     Weight gain         Past Surgical History:   Procedure Laterality Date    HX BACK SURGERY      HX  SECTION      HX TUBAL LIGATION         Social History   Substance Use Topics    Smoking status: Former Smoker     Packs/day: 0.50     Years: 30.00     Types: Cigarettes     Start date: 1969     Quit date: 10/31/2016    Smokeless tobacco: Never Used      Comment: Per pt. 10 a day     Alcohol use No        Family History   Problem Relation Age of Onset    Seizures Other     Diabetes Mother     Hypertension Mother     Heart Disease Mother     Cancer Mother     Diabetes Father     Stroke Father     Heart Disease Father     Headache Sister     Depression Neg Hx     Suicide Neg Hx     Psychotic Disorder Neg Hx     Substance Abuse Neg Hx     Dementia Neg Hx         Allergies   Allergen Reactions    Moxifloxacin Rash    Pcn [Penicillins] Swelling    Sulfa (Sulfonamide Antibiotics) Swelling        Prior to Admission medications    Medication Sig Start Date End Date Taking? Authorizing Provider   oxyCODONE-acetaminophen (PERCOCET) 5-325 mg per tablet Take 1 Tab by mouth every eight (8) hours as needed for Pain. Max Daily Amount: 3 Tabs. 4/20/17   Brittany Sanders MD   umeclidinium-vilanterol (ANORO ELLIPTA) 62.5-25 mcg/actuation inhaler Take 1 Puff by inhalation daily. 2/7/17   Yael Capellan MD   VENTOLIN HFA 90 mcg/actuation inhaler INHALE 2 PUFFS EVERY 4 HOURS AS NEEDED 12/12/16   Yael Capellan MD   divalproex DR (DEPAKOTE) 250 mg tablet Take 250 mg by mouth nightly. Historical Provider   furosemide (LASIX) 20 mg tablet Take 1 Tab by mouth daily. 11/8/16   Adela Rocha MD   metOLazone (ZAROXOLYN) 5 mg tablet Take 0.5 Tabs by mouth Every Mon, Wed & Sun. 11/9/16   Adela Rocha MD   potassium chloride (KLOR-CON) 20 mEq packet Take 1 Packet by mouth daily. 11/8/16   Adela Rocha MD   clopidogrel (PLAVIX) 75 mg tablet Take 1 Tab by mouth daily. 11/8/16   Adela Rocha MD   insulin glargine (LANTUS) 100 unit/mL injection 60 units subcutaneously every night 11/8/16   Adela Rocha MD   insulin syringe,safetyneedle 1 mL 30 gauge x 5/16\" syrg As directed 11/8/16   Adela Rocha MD   ARIPiprazole (ABILIFY) 5 mg tablet Take 10 mg by mouth daily. Historical Provider   albuterol (PROVENTIL VENTOLIN) 2.5 mg /3 mL (0.083 %) nebulizer solution USE 1 NEB EVERY 4 HOURS FOR WHEEZING AND SHORTNESS OF BREATH  Indications: CHRONIC OBSTRUCTIVE PULMONARY DISEASE 7/25/16   Yael Capellan MD   eplerenone (INSPRA) 25 mg tablet Take  by mouth daily. Historical Provider   gabapentin (NEURONTIN) 300 mg capsule Take 300 mg by mouth three (3) times daily. Historical Provider   acetaminophen (TYLENOL) 325 mg tablet Take 325 mg by mouth every six (6) hours as needed for Pain. Historical Provider   traZODone (DESYREL) 100 mg tablet Take 100 mg by mouth nightly. Karsten Serrato MD   rosuvastatin (CRESTOR) 5 mg tablet Take 1 Tab by mouth nightly.  4/10/13   Jen Moreno MD l-methylfolate-vit b12-vit b6 (METANX) 2.8-2-25 mg Tab Take  by mouth. Historical Provider   aspirin 81 mg tablet Take 81 mg by mouth daily. Historical Provider   Nebulizer Accessories Kit Use with nebulizer machine 10/31/12   Preethi Navarrete MD   insulin CONCENTRATED regular (U-500 CONCENTRATED) 500 unit/mL Soln 20 Units by SubCUTAneous route. With breakfast, lunch, and dinner    Historical Provider   levothyroxine (SYNTHROID) 100 mcg tablet Take  by mouth Daily (before breakfast). Historical Provider   sertraline (ZOLOFT) 100 mg tablet Take 50 mg by mouth daily. Historical Provider   Oxygen-Air Delivery Systems by Nasal route continuous. 2 LNC    Historical Provider   ergocalciferol (VITAMIN D) 50,000 unit capsule Take 50,000 Units by mouth daily.     Historical Provider       REVIEW OF SYSTEMS:     CONSTITUTIONAL: No chills, No weight loss, No Night sweats  HEENT:  No epistaxis, No diff in swallowing  CVS: No chest pain, No palpitations, No syncope, No peripheral edema, No PND, No orthopnea  RS: No hemoptysis, No pleuritic chest pain  GI: No abd pain, No vomitting, No diarrhea, No hematemesis, No rectal bleeding, No acid reflux or heartburn  NEURO: No focal weakness, No headaches, No seizures  PSYCH: No anxiety, No depression  MUSCULOSKLETAL: left e;bow pain  : No hematuria or dysuria  SKIN: No rash      Physical Exam:    VITALS:    Vital signs reviewed; most recent are:    Visit Vitals    /64    Pulse (!) 101    Temp 98.8 °F (37.1 °C)    Resp 27    Ht 5' 5\" (1.651 m)    Wt 95.3 kg (210 lb)    SpO2 100%    BMI 34.95 kg/m2     SpO2 Readings from Last 6 Encounters:   05/02/17 100%   04/20/17 98%   02/07/17 99%   11/25/16 98%   11/23/16 98%   11/21/16 98%        No intake or output data in the 24 hours ending 05/02/17 0644       GENERAL: Not in acute distress  HEENT: pink conjunctiva, un icteric sclera,   NECK: No lymphadenopthy or thyroid swelling, JVD not seen  LYMPH: No supraclavicular or cervical or axillary nodes on both sides  CVS: S1S2, No murmurs, No gallop or rub  RS: CTA, No wheezing or crackles  Abd: Soft, non tender, not distended, No guarding, No rigidity  NEURO:  No focal neurologic deficits   Extrm: no leg edema or swelling   Skin: No rash      Labs:  Recent Results (from the past 24 hour(s))   CBC WITH AUTOMATED DIFF    Collection Time: 05/01/17 10:02 PM   Result Value Ref Range    WBC 10.3 4.6 - 13.2 K/uL    RBC 5.19 4.20 - 5.30 M/uL    HGB 14.3 12.0 - 16.0 g/dL    HCT 41.5 35.0 - 45.0 %    MCV 80.0 74.0 - 97.0 FL    MCH 27.6 24.0 - 34.0 PG    MCHC 34.5 31.0 - 37.0 g/dL    RDW 14.1 11.6 - 14.5 %    PLATELET 936 110 - 008 K/uL    MPV 9.6 9.2 - 11.8 FL    NEUTROPHILS 77 (H) 40 - 73 %    LYMPHOCYTES 12 (L) 21 - 52 %    MONOCYTES 11 (H) 3 - 10 %    EOSINOPHILS 0 0 - 5 %    BASOPHILS 0 0 - 2 %    ABS. NEUTROPHILS 7.8 1.8 - 8.0 K/UL    ABS. LYMPHOCYTES 1.3 0.9 - 3.6 K/UL    ABS. MONOCYTES 1.1 0.05 - 1.2 K/UL    ABS. EOSINOPHILS 0.0 0.0 - 0.4 K/UL    ABS.  BASOPHILS 0.0 0.0 - 0.1 K/UL    DF AUTOMATED     METABOLIC PANEL, BASIC    Collection Time: 05/01/17 10:02 PM   Result Value Ref Range    Sodium 132 (L) 136 - 145 mmol/L    Potassium 3.8 3.5 - 5.5 mmol/L    Chloride 92 (L) 100 - 108 mmol/L    CO2 31 21 - 32 mmol/L    Anion gap 9 3.0 - 18 mmol/L    Glucose 252 (H) 74 - 99 mg/dL    BUN 26 (H) 7.0 - 18 MG/DL    Creatinine 1.84 (H) 0.6 - 1.3 MG/DL    BUN/Creatinine ratio 14 12 - 20      GFR est AA 34 (L) >60 ml/min/1.73m2    GFR est non-AA 28 (L) >60 ml/min/1.73m2    Calcium 9.6 8.5 - 10.1 MG/DL   POC LACTIC ACID    Collection Time: 05/01/17 10:31 PM   Result Value Ref Range    Lactic Acid (POC) 2.2 (HH) 0.4 - 2.0 mmol/L   URINALYSIS W/ RFLX MICROSCOPIC    Collection Time: 05/02/17  1:20 AM   Result Value Ref Range    Color DARK YELLOW      Appearance CLEAR      Specific gravity 1.019 1.005 - 1.030      pH (UA) 5.0 5.0 - 8.0      Protein NEGATIVE  NEG mg/dL    Glucose NEGATIVE NEG mg/dL    Ketone TRACE (A) NEG mg/dL    Bilirubin NEGATIVE  NEG      Blood NEGATIVE  NEG      Urobilinogen 1.0 0.2 - 1.0 EU/dL    Nitrites NEGATIVE  NEG      Leukocyte Esterase NEGATIVE  NEG     POC LACTIC ACID    Collection Time: 05/02/17  2:23 AM   Result Value Ref Range    Lactic Acid (POC) 0.8 0.4 - 2.0 mmol/L   GLUCOSE, POC    Collection Time: 05/02/17  4:01 AM   Result Value Ref Range    Glucose (POC) 223 (H) 70 - 110 mg/dL         Active Problems:    SIRS (systemic inflammatory response syndrome) ([facility] [address] (5/2/2017)        Assessment:       1. Left elbow pain/swelling, Small possible avulsion fracture from the ulnohumeral articulation. 2. Fever  3. SIRS  4. Mechanical fall  5. Recent left foot and left hand cellulitis, abscess s/p I&D   6. DM-2, uncontrolled  7. COPD,   8. HTN, controlled  9. Hep C,   10. Chronic diastolic CHF,   11. Bipolar d/o   12. H/o IV drug abuse     Plan:       · Blood culture x2  · Started on empiric antibiotic in ED with vancomycin and ceftriaxone  · Monitor cultures  · IV analgesics for pain control  · Ortho consult for suspected avulsion fracture from the ulnohumeral articulation. · Resume home medications  · Monitor blood glucose, SSI coverage  · Full code status  · DVT prophylaxsis        Total time:  55 minutes             _______________________________________________________________________        Attending Physician:  Sana Veras MD

## 2017-05-02 NOTE — ED PROVIDER NOTES
HPI Comments: 64yo female with numerous medical problems including recent infections of her left foot and left hand presents to ER via EMS complaining of fall at home 4 days ago. Patient states she stumble and fell. Lenice Minor onto onto right knee and then left hand to break fall. Patient states she was able to ambulate after but with discomfort. States she thought she'd wait a few days to see if symptoms/pain would improved. Patient is currently being treated for back of left hand abscess and left foot abscess. States she has recently finished a 7 day course of antibiotics. Wound nurse came to house today and repacked left hand and redressed left foot. Patient is a 62 y.o. female presenting with fall. Fall   Pertinent negatives include no numbness, no abdominal pain, no nausea, no vomiting and no headaches. Past Medical History:   Diagnosis Date    Anxiety     Asthma     Back injury 1986    jumped out of second story window    Behavioral change     Bilateral knee pain     Bipolar disorder (HCC)     Chronic airway obstruction, not elsewhere classified     SOB, on paula O2 Recent admission with psychosis    Chronic back pain     Chronic diastolic heart failure (HCC)     Stable on diuretics    Chronic kidney disease     stage III    Congestive heart failure (HCC)     COPD (chronic obstructive pulmonary disease) ([facility] [address] 10/31/2012    Cramps, muscle, general     Depression     Diabetes mellitus     Difficulty speaking     Difficulty walking     Difficulty writing     DJD (degenerative joint disease) of knee     bilateral, mild    Edema     The edema involves both lower extremities. The episodes last for 1 week. The patient describes this as worsening. Symptoms are exacerbated by prolonged sitting. Symptoms are relieved by leg elevation and diuretics. NO CHANGE    Essential hypertension, benign     Better controlled    Falls frequently     Fatigue     Generalized stiffness     Headache     Heartburn     Hiatal hernia     History of hepatitis C     treated    Hoarseness of voice     Hypothyroidism     Lung disease     Memory difficulty     Morbid obesity (HCC)     Multiple joint pain     Nausea     Numbness and tingling     arms and legs, related to diabetic neuropathy    Osteoarthritis of left knee     Other and unspecified hyperlipidemia     LDL reportedly very high/started on crestor recently by diabetic doctor    Oxygen dependent     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     Sarcoidosis ([facility] [address]     Sickle cell trait ([facility] [address]     Sinusitis     SOB (shortness of breath)     STD (female)     herpes    Swelling of body region     legs and feet    Tinnitus     Venous insufficiency     Vertigo     Weakness generalized     Wears glasses     Weight gain        Past Surgical History:   Procedure Laterality Date    HX BACK SURGERY      HX  SECTION      HX TUBAL LIGATION           Family History:   Problem Relation Age of Onset    Seizures Other     Diabetes Mother     Hypertension Mother     Heart Disease Mother     Cancer Mother     Diabetes Father     Stroke Father     Heart Disease Father     Headache Sister     Depression Neg Hx     Suicide Neg Hx     Psychotic Disorder Neg Hx     Substance Abuse Neg Hx     Dementia Neg Hx        Social History     Social History    Marital status: SINGLE     Spouse name: N/A    Number of children: N/A    Years of education: N/A     Occupational History    Not employed Not Employed     Social History Main Topics    Smoking status: Former Smoker     Packs/day: 0.50     Years: 30.00     Types: Cigarettes     Start date: 1969     Quit date: 10/31/2016    Smokeless tobacco: Never Used      Comment: Per pt.  10 a day     Alcohol use No    Drug use: No      Comment: +Cocaine Screen 2013    Sexual activity: Not on file      Comment: 2014     Other Topics Concern    Not on file     Social History Narrative    Lives with 15year old son. ALLERGIES: Moxifloxacin; Pcn [penicillins]; and Sulfa (sulfonamide antibiotics)    Review of Systems   Constitutional: Positive for activity change, appetite change and fatigue. Negative for chills. HENT: Negative. Respiratory: Negative for cough and shortness of breath. Cardiovascular: Negative for chest pain. Gastrointestinal: Negative for abdominal pain, nausea and vomiting. Genitourinary: Negative for difficulty urinating and dysuria. Musculoskeletal: Positive for arthralgias, gait problem and joint swelling. Negative for back pain and neck pain. Skin: Positive for wound. Neurological: Negative for weakness, numbness and headaches. All other systems reviewed and are negative. There were no vitals filed for this visit. Physical Exam   Constitutional: She is oriented to person, place, and time. She appears well-developed. No distress. Obese. Chronically ill appearing. HENT:   Head: Atraumatic. Mouth/Throat: Oropharynx is clear and moist.   Eyes: EOM are normal. Pupils are equal, round, and reactive to light. Neck: Normal range of motion. Cardiovascular: Regular rhythm and normal heart sounds. Tachycardia present. No murmur heard. Pulmonary/Chest: Effort normal. She has no rales. Slight bilateral wheezes with good air exchange. Abdominal: Soft. Bowel sounds are normal. There is no tenderness. There is no guarding. Musculoskeletal:   Minimal TTP over anterior right knee and lateral ankle, distal fibula. Minimal TTP of left wrist.  Left hand is puffy including all fingers but soft and non tender. Left forearm non tender. Left elbow moderately swollen, very warm to touch. Lateral aspect TTP and increased discomfort with attempt to passively extending. Wound dressing removed from dorsal hand, small 1cm wound with packing within the wound.      Neurological: She is alert and oriented to person, place, and time. No cranial nerve deficit. Skin: She is not diaphoretic. Psychiatric: She has a normal mood and affect. Nursing note and vitals reviewed. MDM  Number of Diagnoses or Management Options  Left elbow fracture, closed, initial encounter:   SIRS (systemic inflammatory response syndrome) ([facility] [address]):   Diagnosis management comments: 64yo female with multiple comorbid conditions presents top ER complaining of left elbow, left hand, right knee and right ankle pain after a fall 4 days PTA. States she tripped, stumbled, and fell injuring right ankle, knee and left wrist and elbow. Of greater concern would be her fever, tachycardia, elevate lactic acid with history of recheck left hand and foot infections for which she was admitted, I&D procedures, and IV antibiotics. Xrays of right knee, ankle, and hand/wrist negative. Possible ulnohumeral fracture without obvious donor site. Elbow is indeed swollen and warm to the touch without obvious cutaneous infection. Difficult to determine redness secondary to skin color. Can not rule out septic joint, history of fall and injury complicates the history. Patient left hand and foot appear to be healing well without signs of infection. Patient does have history of IV heroin abuse. Blood cultures pending. No leukocytosis. CXR and UA without signs of infection. Lactic acid improved with IVF. Hemodynamically stable. IV vanc and rocephin started for empiric abx coverage. ED Course       Procedures     3:05 AM  SPOKE WITH DR. Susanne Rodriguez ABOUT PATIENT ABOUT ONE HOUR AGO. ADDRESSED MY CONCERNS ABOUT SIRS, RECENT HOSPITALIZATION FOR CUTANEOUS INFECTIONS, AND LEFT ELBOW SWELLING, PAIN, WARMTH WITH XRAY SHOWING POSSIBLE FRACTURE BUT CONCERNS IF SHE COULD POSSIBLY HAVE INFECTION OF ELBOW. I was personally available for consultation in the emergency department.  I have reviewed the chart prior to the patient's discharge and agree with the documentation recorded by the [facility] AND [facility], including the assessment, treatment plan, and disposition.

## 2017-05-02 NOTE — ED NOTES
Patient assisted onto bedpan. Patient requesting pain medication for left arm pain. Will administer PRN orders.

## 2017-05-02 NOTE — ED TRIAGE NOTES
Patient reports she stumbled over a glass table, C/O left elbow pain, wrist and hand swelling, right knee and right ankle pain. Patient reports she thought the injury was just bruising but the symptoms has not gotten better. Patient have a personal care aid who assist in care, patient C/O difficulty walking since Friday with decrease of appetite.

## 2017-05-02 NOTE — ED NOTES
TRANSFER - OUT REPORT:    Verbal report given to Chary Wright (name) on [address]  being transferred to [address] [place] [address] ([facility]) for routine progression of care       Report consisted of patients Situation, Background, Assessment and   Recommendations(SBAR). Information from the following report(s) SBAR, ED Summary, Intake/Output, MAR, Recent Results and Cardiac Rhythm ST was reviewed with the receiving nurse. Lines:   Peripheral IV 05/01/17 Right Hand (Active)   Site Assessment Clean, dry, & intact 5/1/2017 10:46 PM   Phlebitis Assessment 0 5/1/2017 10:46 PM   Infiltration Assessment 0 5/1/2017 10:46 PM   Dressing Status Clean, dry, & intact 5/1/2017 10:46 PM   Dressing Type Transparent 5/1/2017 10:46 PM       Peripheral IV 05/02/17 Right Wrist (Active)   Site Assessment Clean, dry, & intact 5/2/2017  4:09 AM   Phlebitis Assessment 0 5/2/2017  4:09 AM   Infiltration Assessment 0 5/2/2017  4:09 AM   Dressing Status Clean, dry, & intact 5/2/2017  4:09 AM   Dressing Type Transparent 5/2/2017  4:09 AM        Opportunity for questions and clarification was provided.       Patient transported with:   Monitor

## 2017-05-03 LAB
GLUCOSE BLD STRIP.AUTO-MCNC: 110 MG/DL (ref 70–110)
GLUCOSE BLD STRIP.AUTO-MCNC: 145 MG/DL (ref 70–110)
GLUCOSE BLD STRIP.AUTO-MCNC: 188 MG/DL (ref 70–110)
GLUCOSE BLD STRIP.AUTO-MCNC: 204 MG/DL (ref 70–110)

## 2017-05-03 PROCEDURE — A6209 FOAM DRSG <=16 SQ IN W/O BDR: HCPCS

## 2017-05-03 PROCEDURE — 74011636637 HC RX REV CODE- 636/637: Performed by: INTERNAL MEDICINE

## 2017-05-03 PROCEDURE — 74011250637 HC RX REV CODE- 250/637: Performed by: INTERNAL MEDICINE

## 2017-05-03 PROCEDURE — 74011250636 HC RX REV CODE- 250/636: Performed by: INTERNAL MEDICINE

## 2017-05-03 PROCEDURE — 82962 GLUCOSE BLOOD TEST: CPT

## 2017-05-03 PROCEDURE — 74011000250 HC RX REV CODE- 250: Performed by: INTERNAL MEDICINE

## 2017-05-03 PROCEDURE — 65270000029 HC RM PRIVATE

## 2017-05-03 PROCEDURE — 74011000258 HC RX REV CODE- 258: Performed by: INTERNAL MEDICINE

## 2017-05-03 RX ORDER — LEVOFLOXACIN 5 MG/ML
750 INJECTION, SOLUTION INTRAVENOUS EVERY 24 HOURS
Status: DISCONTINUED | OUTPATIENT
Start: 2017-05-03 | End: 2017-05-05

## 2017-05-03 RX ORDER — INSULIN LISPRO 100 [IU]/ML
5 INJECTION, SOLUTION INTRAVENOUS; SUBCUTANEOUS
Status: DISCONTINUED | OUTPATIENT
Start: 2017-05-03 | End: 2017-05-04

## 2017-05-03 RX ORDER — ACETAMINOPHEN 325 MG/1
650 TABLET ORAL
Status: DISCONTINUED | OUTPATIENT
Start: 2017-05-03 | End: 2017-05-10 | Stop reason: HOSPADM

## 2017-05-03 RX ORDER — IPRATROPIUM BROMIDE AND ALBUTEROL SULFATE 2.5; .5 MG/3ML; MG/3ML
3 SOLUTION RESPIRATORY (INHALATION)
Status: DISCONTINUED | OUTPATIENT
Start: 2017-05-03 | End: 2017-05-10 | Stop reason: HOSPADM

## 2017-05-03 RX ADMIN — TRAZODONE HYDROCHLORIDE 100 MG: 100 TABLET ORAL at 22:16

## 2017-05-03 RX ADMIN — LEVOFLOXACIN 750 MG: 5 INJECTION, SOLUTION INTRAVENOUS at 13:47

## 2017-05-03 RX ADMIN — LEVOTHYROXINE SODIUM 100 MCG: 100 TABLET ORAL at 08:35

## 2017-05-03 RX ADMIN — Medication 2 MG: at 12:47

## 2017-05-03 RX ADMIN — GABAPENTIN 300 MG: 300 CAPSULE ORAL at 22:18

## 2017-05-03 RX ADMIN — ACETAMINOPHEN 650 MG: 325 TABLET ORAL at 03:45

## 2017-05-03 RX ADMIN — ACETAMINOPHEN 650 MG: 325 TABLET ORAL at 13:50

## 2017-05-03 RX ADMIN — Medication 10 ML: at 13:48

## 2017-05-03 RX ADMIN — Medication 10 ML: at 00:34

## 2017-05-03 RX ADMIN — GABAPENTIN 300 MG: 300 CAPSULE ORAL at 18:03

## 2017-05-03 RX ADMIN — INSULIN LISPRO 4 UNITS: 100 INJECTION, SOLUTION INTRAVENOUS; SUBCUTANEOUS at 18:02

## 2017-05-03 RX ADMIN — Medication 2 MG: at 08:36

## 2017-05-03 RX ADMIN — SERTRALINE HYDROCHLORIDE 50 MG: 50 TABLET ORAL at 08:36

## 2017-05-03 RX ADMIN — Medication 2 MG: at 22:16

## 2017-05-03 RX ADMIN — ROSUVASTATIN CALCIUM 5 MG: 5 TABLET ORAL at 22:16

## 2017-05-03 RX ADMIN — AZTREONAM 1 G: 1 INJECTION, POWDER, LYOPHILIZED, FOR SOLUTION INTRAMUSCULAR; INTRAVENOUS at 03:39

## 2017-05-03 RX ADMIN — ARIPIPRAZOLE 10 MG: 10 TABLET ORAL at 08:34

## 2017-05-03 RX ADMIN — INSULIN LISPRO 7 UNITS: 100 INJECTION, SOLUTION INTRAVENOUS; SUBCUTANEOUS at 00:00

## 2017-05-03 RX ADMIN — INSULIN LISPRO 5 UNITS: 100 INJECTION, SOLUTION INTRAVENOUS; SUBCUTANEOUS at 18:03

## 2017-05-03 RX ADMIN — GABAPENTIN 300 MG: 300 CAPSULE ORAL at 08:36

## 2017-05-03 RX ADMIN — INSULIN LISPRO 5 UNITS: 100 INJECTION, SOLUTION INTRAVENOUS; SUBCUTANEOUS at 12:47

## 2017-05-03 RX ADMIN — HEPARIN SODIUM 5000 UNITS: 5000 INJECTION, SOLUTION INTRAVENOUS; SUBCUTANEOUS at 18:18

## 2017-05-03 RX ADMIN — AZTREONAM 1 G: 1 INJECTION, POWDER, LYOPHILIZED, FOR SOLUTION INTRAMUSCULAR; INTRAVENOUS at 11:10

## 2017-05-03 RX ADMIN — Medication 10 ML: at 05:48

## 2017-05-03 RX ADMIN — Medication 2 MG: at 18:00

## 2017-05-03 RX ADMIN — VANCOMYCIN HYDROCHLORIDE 1250 MG: 10 INJECTION, POWDER, LYOPHILIZED, FOR SOLUTION INTRAVENOUS at 11:41

## 2017-05-03 RX ADMIN — Medication 10 ML: at 22:20

## 2017-05-03 RX ADMIN — HEPARIN SODIUM 5000 UNITS: 5000 INJECTION, SOLUTION INTRAVENOUS; SUBCUTANEOUS at 12:52

## 2017-05-03 RX ADMIN — CLOPIDOGREL BISULFATE 75 MG: 75 TABLET, FILM COATED ORAL at 08:34

## 2017-05-03 RX ADMIN — Medication 2 MG: at 03:27

## 2017-05-03 RX ADMIN — ASPIRIN 81 MG: 81 TABLET, COATED ORAL at 08:36

## 2017-05-03 RX ADMIN — DIVALPROEX SODIUM 250 MG: 250 TABLET, DELAYED RELEASE ORAL at 22:16

## 2017-05-03 NOTE — ROUTINE PROCESS
Bedside shift change report given to JENNY carlson (oncoming nurse) by Azam Bustamante (offgoing nurse). Report included the following information SBAR, Kardex, MAR and Recent Results.

## 2017-05-03 NOTE — PROGRESS NOTES
[facility] Hospitalist Group  Progress Note    Patient: Joe Sinha Age: 62 y.o. : 1955 MR#: [id number] SSN: [id number]  Date/Time: 5/3/2017     Subjective:     Patient denies Fever , Chills , Weight loss or Weight Gain , No SOB, No Cough , no Hemoptysis , No Chest pains , No Palpitations , No NVD . Painful movement of both Upper limbs       Assessment/Plan:   Patient with PMH of DM2, COPD , HTN , Hep C , H/o Drug abuse , recent I&D of left hand and left foot  for abscess, had fall and noted to have fever in ED . Fever + tachycardia +tachypnea. + hyperglycemia +no elevation of LA  And no leucocytosis . 1. Sepsis - SIRS   - Fever , tachycardia , Tachypnea , recent I&D of Left hand and left foot   - recent surgical cultures were positive for  staph Epi - sensitive to Levaquin Rpt blood cultures are  negative so far  - chest xray - no infiltrate   - ID consult   - Get MRI of Left Hand and left ankle for ?  Residual abscess   - Change Azactam to Levaquin     2 DM2   - Continue current treatments - add meal time rapid insulin   - SSI     3 COPD   - Continue BD     4 Hypothyroid   - Continue replacement     Full CODE       Case discussed with:  [x]Patient  []Family  []Nursing  []Case Management  DVT Prophylaxis:  []Lovenox  [x]Hep SQ  []SCDs  []Coumadin   []On Heparin gtt    Patient Active Problem List   Diagnosis Code    Diabetes mellitus ([facility] [address] E11.9    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 G62.9    Venous insufficiency I87.2    Morbid obesity (HCC) E66.01    Chronic back pain M54.9, G89.29    Oxygen dependent Z99.81    Hypertension I10    Pain in joint, multiple sites M25.50    Encounter for long-term (current) use of other medications Z79.899    Major depressive disorder, recurrent (HCC) F33.9    Bipolar affective disorder ([facility] [address] F31.9    Other and unspecified hyperlipidemia E78.5    Chronic diastolic heart failure (HCC) I50.32    Chronic airway obstruction, not elsewhere classified J44.9    Essential hypertension, benign I10    Chronic kidney disease N18.9    Depression F32.9    Back injury S39. 92XA    Bilateral knee pain M25.561, M25.562    Anxiety F41.9    Wears glasses Z97.3    History of hepatitis C Z86.19    Sickle cell trait (HCC) D57.3    Acute renal failure (HCC) N17.9    Unspecified hypothyroidism E03.9    Genital herpes A60.00    Sarcoidosis (HCC) M58.7    Metabolic encephalopathy U52.00    Hyponatremia E87.1    Abscess of right arm L02.413    Abdominal pain, epigastric R10.13    Chest pain R07.9    Hypoxemia requiring supplemental oxygen R09.02, Z99.81    Hyperglycemia R73.9    Elevated troponin R74.8    CAP (community acquired pneumonia) J18.9    'Light-for-dates' infant with signs of fetal malnutrition P05.00    Pneumonia J18.9    Abnormal nuclear stress test R94.39    Cellulitis L03.90    Cellulitis and abscess of hand L03.119, L02.519    Cellulitis and abscess of foot L03.119, L02.619    SIRS (systemic inflammatory response syndrome) ([facility]) R65.10       Objective:   VS:   Visit Vitals    /68 (BP 1 Location: Right arm, BP Patient Position: Head of bed elevated (Comment degrees))    Pulse 85    Temp (!) 100.6 °F (38.1 °C)    Resp 18    Ht 5' 5\" (1.651 m)    Wt 95.3 kg (210 lb)    LMP 2012    SpO2 90%    BMI 34.95 kg/m2      Tmax/24hrs: Temp (24hrs), Av.2 °F (38.4 °C), Min:99.6 °F (37.6 °C), Max:102.9 °F (39.4 °C)  IOBRIEF  Intake/Output Summary (Last 24 hours) at 17 1246  Last data filed at 17 1500   Gross per 24 hour   Intake              250 ml   Output              300 ml   Net              -50 ml       General:  Alert, cooperative, no acute distress   HEENT:  NC, Atraumatic. PERRLA, anicteric sclerae. Pulmonary:  CTA Bilaterally. No Wheezing/Rhonchi/Rales. Cardiovascular: Regular rate and Rhythm. GI:  Soft, Non distended, Non tender. + Bowel sounds.   Extremities: left arm bandaged Psych:  Not anxious or agitated. Neurologic: Grossly - Motor and Sensory functions are intact .  No Anxiety , calm , no Agitation         Medications:   Current Facility-Administered Medications   Medication Dose Route Frequency    acetaminophen (TYLENOL) tablet 650 mg  650 mg Oral Q6H PRN    acetaminophen (TYLENOL) tablet 650 mg  650 mg Oral Q6H PRN    albuterol (PROVENTIL VENTOLIN) nebulizer solution 2.5 mg  2.5 mg Nebulization Q4H PRN    ARIPiprazole (ABILIFY) tablet 10 mg  10 mg Oral DAILY    aspirin delayed-release tablet 81 mg  81 mg Oral DAILY    clopidogrel (PLAVIX) tablet 75 mg  75 mg Oral DAILY    divalproex DR (DEPAKOTE) tablet 250 mg  250 mg Oral QHS    eplerenone (INSPRA) tablet 25 mg  25 mg Oral DAILY    gabapentin (NEURONTIN) capsule 300 mg  300 mg Oral TID    insulin glargine (LANTUS) injection 60 Units  60 Units SubCUTAneous QHS    levothyroxine (SYNTHROID) tablet 100 mcg  100 mcg Oral ACB    rosuvastatin (CRESTOR) tablet 5 mg  5 mg Oral QHS    sertraline (ZOLOFT) tablet 50 mg  50 mg Oral DAILY    traZODone (DESYREL) tablet 100 mg  100 mg Oral QHS    umeclidinium-vilanterol (ANORO ELLIPTA) 62.5 mcg- 25 mcg/inhalation  1 Puff Inhalation DAILY    sodium chloride (NS) flush 5-10 mL  5-10 mL IntraVENous Q8H    sodium chloride (NS) flush 5-10 mL  5-10 mL IntraVENous PRN    morphine injection 2 mg  2 mg IntraVENous Q4H PRN    heparin (porcine) injection 5,000 Units  5,000 Units SubCUTAneous Q8H    aztreonam (AZACTAM) 1 g in 0.9% sodium chloride (MBP/ADV) 100 mL MBP  1 g IntraVENous Q8H    vancomycin (VANCOCIN) 1,250 mg in 0.9% sodium chloride 250 mL IVPB  1,250 mg IntraVENous Q18H    insulin lispro (HUMALOG) injection   SubCUTAneous AC&HS       Labs:    Recent Results (from the past 24 hour(s))   GLUCOSE, POC    Collection Time: 05/02/17 10:10 PM   Result Value Ref Range    Glucose (POC) 237 (H) 70 - 110 mg/dL   GLUCOSE, POC    Collection Time: 05/03/17  8:54 AM   Result Value Ref Range    Glucose (POC) 145 (H) 70 - 110 mg/dL   GLUCOSE, POC    Collection Time: 05/03/17 11:25 AM   Result Value Ref Range    Glucose (POC) 204 (H) 70 - 110 mg/dL       Signed By: Sierra Sellers MD     May 3, 2017

## 2017-05-03 NOTE — DIABETES MGMT
Diabetes Patient/Family Education Record    Factors That  May Influence Patients Ability  to Learn or  Comply With  Recommendations:    []   Language barrier    []   Cultural needs   []   Motivation    []   Cognitive limitation    []   Physical   [x]   Education    []   Physiological factors   []   Hearing/vision/speaking impairment   []   Christianity beliefs    []   Financial factors   []  Other:   []  No factors identified at this time. Person Instructed:   [x]   Patient   []   Family   []  Other     Preference for Learning:   [x]   Verbal   []   Written   []  Demonstration     Level of Comprehension & Competence:    [x]  Good                                      [] Fair                                     []  Poor                             []  Needs Reinforcement   [x]  Teachback completed    Education Component: Please refer to recent education notes, 04/14/2017. [x]  Medication management, including how to administer insulin (if appropriate) and potential medication interactions: Patient stated that she is still on the same diabetes meds at home prior to this admission:   Humulin R U-500: 15 units daily with breakfast, 15 units daily with lunch, and 20 units daily with dinner.     []  Nutritional management including the role of carbohydrate intake   []  Exercise   [x]  Signs, symptoms, and treatment of hyperglycemia and hypoglycemia   [x] Treatment of hyperglycemia and hypoglycemia   [x]  Importance of blood glucose monitoring and how to obtain a blood glucose meter: Patient stated that she has BG meter and testing supplies at home.    []  Instruction on use of blood glucose meter   []  Discuss the importance of HbA1C monitoring and provide patient with  results   []  Sick day guidelines   []  Proper use and disposal of lancets, needles, syringes or insulin pens (if appropriate)   []  Potential long-term complications (retinopathy, kidney disease, neuropathy, heart disease, stroke, vascular disease, foot care)   [x] Provide emergency contact number and contact number for more information    []  Goal:  Patient/family will demonstrate understanding of Diabetes Self Management Skills by: 05/10/2017  Plan for post-discharge education or self-management support:    [x] Outpatient class schedule provided: Patient stated that she has the class schedule at home which was given to her from recent admission.             [] Patient Declined    [] Scheduled for outpatient classes (date) _______         Joye Mcburney, RN

## 2017-05-03 NOTE — PROGRESS NOTES
conducted an initial consultation and Spiritual Assessment for Stephen Sinha, who is a 62 y.o.,female. Patients Primary Language is: [place]. According to the patients EMR Mosque Affiliation is: [place].      The reason the Patient came to the hospital is:   Patient Active Problem List    Diagnosis Date Noted    SIRS (systemic inflammatory response syndrome) ([address] 05/02/2017    Cellulitis and abscess of hand 04/13/2017    Cellulitis and abscess of foot 04/13/2017    Cellulitis 04/12/2017    Abnormal nuclear stress test 11/17/2016    Hyperglycemia 10/31/2016    Elevated troponin 10/31/2016    CAP (community acquired pneumonia) 10/31/2016    'Light-for-dates' infant with signs of fetal malnutrition 10/31/2016    Pneumonia 10/31/2016    Hypoxemia requiring supplemental oxygen 12/29/2014    Abdominal pain, epigastric 07/26/2014    Chest pain 07/26/2014    Abscess of right arm 06/03/2013    Acute renal failure ([address] 05/31/2013    Unspecified hypothyroidism 05/31/2013    Genital herpes 05/31/2013    Sarcoidosis ([address] 11/31/3897    Metabolic encephalopathy 82/21/4414    Hyponatremia 05/31/2013    Chronic diastolic heart failure (HCC)     Chronic airway obstruction, not elsewhere classified     Essential hypertension, benign     Chronic kidney disease     Depression     Back injury     Bilateral knee pain     Anxiety     Wears glasses     History of hepatitis C     Sickle cell trait ([address]     Other and unspecified hyperlipidemia     Bipolar affective disorder ([address] 12/05/2012    Major depressive disorder, recurrent ([address] 11/30/2012    Pain in joint, multiple sites 08/16/2011    Encounter for long-term (current) use of other medications 08/16/2011    Diabetes mellitus ([address]     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     Venous insufficiency     Morbid obesity ([address]     Chronic back pain     Oxygen dependent     Hypertension         The  provided the following Interventions:  Initiated a relationship of care and support. Listened empathically. Offered prayer and assurance of continued prayers on patient's behalf. Chart reviewed. The following outcomes where achieved:  Patient expressed gratitude for 's visit. Assessment:  Patient does not have any Jain/cultural needs that will affect patients preferences in health care. There are no spiritual or Jain issues which require intervention at this time. Plan:  Chaplains will continue to follow and will provide pastoral care on an as needed/requested basis.  recommends bedside caregivers page  on duty if patient shows signs of acute spiritual or emotional distress.       [facility]   [phone]

## 2017-05-03 NOTE — DIABETES MGMT
NUTRITIONAL ASSESSMENT GLYCEMIC CONTROL/ PLAN OF CARE     Ashley Sinha           62 y.o.           5/1/2017                 1. SIRS (systemic inflammatory response syndrome) ([facility] [address]    2. Left elbow fracture, closed, initial encounter       INTERVENTIONS/PLAN:   Continue basal, prandial, and correctional insulin coverage  Diabetes education   ASSESSMENT:   Pt is a 62year old female with a past medical history significant for type 2 diabetes, hypertension, COPD, hep C, and drug abuse. Blood glucose slightly elevated above targets. Pt is receiving basal, prandial, and correctional insulin coverage. Pt reports appetite has been somewhat decreased. Tolerated lunch meal. Pt reports following a diabetic diet at home but did not elaborate. Pt very tired at time of visit and minimally answering questions. Written material provided. Diabetes Management:   Recent blood glucose:    5/2/2017 04:01 5/2/2017 22:10 5/3/2017 08:54 5/3/2017 11:25   223 (H) 237 (H) 145 (H) 204 (H)    Within target range (non-ICU: <140; ICU<180): [] Yes   [x]  No    Current Insulin regimen:   Lantus insulin 60 units every bedtime  Correctional Lispro insulin 4 times daily ACHS   Prandial Lispro insulin 5 units TID before meals  Home medication/insulin regimen:   Humulin R U-500: 15 units daily with breakfast, 15 units daily with lunch, and 20 units daily with dinner.    HbA1c: 10.8% (estimated average glucose of 263 mg/dL)  Adequate glycemic control PTA:  [] Yes  [x] No     SUBJECTIVE/OBJECTIVE:   Information obtained from: patient, chart review    Diet: Diabetic consistent carbohydrate    Medications: [x]  Reviewed     Most Recent POC Glucose:   Recent Labs      05/02/17   1127  05/02/17   0740  05/01/17   2202   GLU  197*  199*  252*       Labs:   Lab Results   Component Value Date/Time    Hemoglobin A1c 10.8 04/20/2017 04:10 AM     Lab Results   Component Value Date/Time    Sodium 137 05/02/2017 11:27 AM    Potassium 3.5 05/02/2017 11:27 AM Chloride 101 05/02/2017 11:27 AM    CO2 27 05/02/2017 11:27 AM    Anion gap 9 05/02/2017 11:27 AM    Glucose 197 05/02/2017 11:27 AM    BUN 15 05/02/2017 11:27 AM    Creatinine 1.07 05/02/2017 11:27 AM    Calcium 8.0 05/02/2017 11:27 AM    Magnesium 1.6 04/18/2017 03:00 AM    Phosphorus 3.2 11/12/2009 05:30 AM    Albumin 3.0 04/12/2017 02:47 PM     Anthropometrics:  BMI (calculated): 34.9  Wt Readings from Last 1 Encounters:   05/02/17 95.3 kg (210 lb)      Ht Readings from Last 1 Encounters:   05/01/17 5' 5\" (1.651 m)     Estimated Nutrition Needs:   1668-7810 Kcal/day, 76-95 grams protein/day  Based on:   [x] Actual BW    [] IBW   []  Adjusted BW      Nutrition Diagnoses: Altered nutrition related lab value related to diabetes as evidenced by Hemoglobin A1c of 10.8%  Nutrition Interventions: diabetes education, coordination of care  Goal: Blood glucose will be within target range of  mg/dL by 5/06/17     Nutrition Monitoring and Evaluation    []     Monitor po intake on meal rounds  [x]     Continue inpatient monitoring and intervention  []     Other:    Diabetes Patient/Family Education Record  Factors That  May Influence Patients Ability  to Learn or  Comply With  Recommendations:    []   Language barrier    []   Cultural needs   []   Motivation    []   Cognitive limitation    []   Physical   []   Education    []   Physiological factors   []   Hearing/vision/speaking impairment   []   Jainism beliefs    []   Financial factors   [x]  Other: pt sleepy at time of visit   []  No factors identified at this time.      Person Instructed:   [x]   Patient   []   Family   []  Other     Preference for Learning:   [x]   Verbal   [x]   Written   []  Demonstration     Level of Comprehension & Competence:    []  Good                                      [] Fair                                     []  Poor                             [x]  Needs Reinforcement   [x]  Teachback completed    Education Component:   [] Medication management, including how to administer insulin (if appropriate) and potential medication interactions    [x]  Nutritional management including the role of carbohydrate intake   []  Exercise   []  Signs, symptoms, and treatment of hyperglycemia and hypoglycemia   [] Treatment of hyperglycemia and hypoglycemia   []  Importance of blood glucose monitoring and how to obtain a blood glucose meter    []  Instruction on use of blood glucose meter   []  Discuss the importance of HbA1C monitoring and provide patient with  results   []  Sick day guidelines   []  Proper use and disposal of lancets, needles, syringes or insulin pens (if appropriate)   []  Potential long-term complications (retinopathy, kidney disease, neuropathy, heart disease, stroke, vascular disease, foot care)   [] Provide emergency contact number and contact number for more information    [x]  Goal:  Patient/family will demonstrate understanding of Diabetes Self Management Skills by: 5/10/17  Plan for post-discharge education or self-management support:    [x] Outpatient class schedule provided            [] Patient Declined    [] Scheduled for outpatient classes (date) _______       Andrew Meraz RD, CDE

## 2017-05-04 ENCOUNTER — APPOINTMENT (OUTPATIENT)
Dept: GENERAL RADIOLOGY | Age: 62
DRG: 383 | End: 2017-05-04
Attending: ORTHOPAEDIC SURGERY
Payer: MEDICAID

## 2017-05-04 ENCOUNTER — APPOINTMENT (OUTPATIENT)
Dept: CT IMAGING | Age: 62
DRG: 383 | End: 2017-05-04
Attending: ORTHOPAEDIC SURGERY
Payer: MEDICAID

## 2017-05-04 PROBLEM — M70.21 OLECRANON BURSITIS OF RIGHT ELBOW: Status: ACTIVE | Noted: 2017-05-04

## 2017-05-04 PROBLEM — L03.113 CELLULITIS OF RIGHT ELBOW: Status: ACTIVE | Noted: 2017-05-04

## 2017-05-04 PROBLEM — S50.00XA CONTUSION OF ELBOW: Status: ACTIVE | Noted: 2017-05-04

## 2017-05-04 LAB
ANION GAP BLD CALC-SCNC: 8 MMOL/L (ref 3–18)
BUN SERPL-MCNC: 10 MG/DL (ref 7–18)
BUN/CREAT SERPL: 12 (ref 12–20)
CALCIUM SERPL-MCNC: 8.2 MG/DL (ref 8.5–10.1)
CHLORIDE SERPL-SCNC: 100 MMOL/L (ref 100–108)
CO2 SERPL-SCNC: 27 MMOL/L (ref 21–32)
CREAT SERPL-MCNC: 0.81 MG/DL (ref 0.6–1.3)
DATE LAST DOSE: ABNORMAL
GLUCOSE BLD STRIP.AUTO-MCNC: 150 MG/DL (ref 70–110)
GLUCOSE BLD STRIP.AUTO-MCNC: 193 MG/DL (ref 70–110)
GLUCOSE SERPL-MCNC: 105 MG/DL (ref 74–99)
POTASSIUM SERPL-SCNC: 3.2 MMOL/L (ref 3.5–5.5)
REPORTED DOSE,DOSE: ABNORMAL UNITS
REPORTED DOSE/TIME,TMG: 1141
SODIUM SERPL-SCNC: 135 MMOL/L (ref 136–145)
VANCOMYCIN TROUGH SERPL-MCNC: 6.7 UG/ML (ref 10–20)

## 2017-05-04 PROCEDURE — 74011250636 HC RX REV CODE- 250/636: Performed by: INTERNAL MEDICINE

## 2017-05-04 PROCEDURE — 82962 GLUCOSE BLOOD TEST: CPT

## 2017-05-04 PROCEDURE — 77010033678 HC OXYGEN DAILY: Performed by: INTERNAL MEDICINE

## 2017-05-04 PROCEDURE — 74011250637 HC RX REV CODE- 250/637: Performed by: INTERNAL MEDICINE

## 2017-05-04 PROCEDURE — 65270000029 HC RM PRIVATE

## 2017-05-04 PROCEDURE — 74011000258 HC RX REV CODE- 258: Performed by: INTERNAL MEDICINE

## 2017-05-04 PROCEDURE — 74011636637 HC RX REV CODE- 636/637: Performed by: INTERNAL MEDICINE

## 2017-05-04 PROCEDURE — 73200 CT UPPER EXTREMITY W/O DYE: CPT

## 2017-05-04 PROCEDURE — 80202 ASSAY OF VANCOMYCIN: CPT | Performed by: INTERNAL MEDICINE

## 2017-05-04 PROCEDURE — 36415 COLL VENOUS BLD VENIPUNCTURE: CPT | Performed by: INTERNAL MEDICINE

## 2017-05-04 PROCEDURE — 80048 BASIC METABOLIC PNL TOTAL CA: CPT | Performed by: INTERNAL MEDICINE

## 2017-05-04 PROCEDURE — 73070 X-RAY EXAM OF ELBOW: CPT

## 2017-05-04 RX ORDER — POTASSIUM CHLORIDE 20 MEQ/1
40 TABLET, EXTENDED RELEASE ORAL 3 TIMES DAILY
Status: COMPLETED | OUTPATIENT
Start: 2017-05-04 | End: 2017-05-04

## 2017-05-04 RX ADMIN — Medication 10 ML: at 06:35

## 2017-05-04 RX ADMIN — ARIPIPRAZOLE 10 MG: 10 TABLET ORAL at 09:25

## 2017-05-04 RX ADMIN — INSULIN GLARGINE 60 UNITS: 100 INJECTION, SOLUTION SUBCUTANEOUS at 22:31

## 2017-05-04 RX ADMIN — LEVOFLOXACIN 750 MG: 5 INJECTION, SOLUTION INTRAVENOUS at 12:37

## 2017-05-04 RX ADMIN — Medication 2 MG: at 22:29

## 2017-05-04 RX ADMIN — HEPARIN SODIUM 5000 UNITS: 5000 INJECTION, SOLUTION INTRAVENOUS; SUBCUTANEOUS at 10:37

## 2017-05-04 RX ADMIN — POTASSIUM CHLORIDE 40 MEQ: 20 TABLET, EXTENDED RELEASE ORAL at 16:09

## 2017-05-04 RX ADMIN — EPLERENONE 25 MG: 25 TABLET, FILM COATED ORAL at 09:25

## 2017-05-04 RX ADMIN — GABAPENTIN 300 MG: 300 CAPSULE ORAL at 22:30

## 2017-05-04 RX ADMIN — Medication 2 MG: at 06:30

## 2017-05-04 RX ADMIN — Medication 2 MG: at 18:57

## 2017-05-04 RX ADMIN — TRAZODONE HYDROCHLORIDE 100 MG: 100 TABLET ORAL at 22:30

## 2017-05-04 RX ADMIN — CLOPIDOGREL BISULFATE 75 MG: 75 TABLET, FILM COATED ORAL at 09:25

## 2017-05-04 RX ADMIN — Medication 2 MG: at 10:33

## 2017-05-04 RX ADMIN — VANCOMYCIN HYDROCHLORIDE 1250 MG: 10 INJECTION, POWDER, LYOPHILIZED, FOR SOLUTION INTRAVENOUS at 06:30

## 2017-05-04 RX ADMIN — VANCOMYCIN HYDROCHLORIDE 500 MG: 500 INJECTION, POWDER, LYOPHILIZED, FOR SOLUTION INTRAVENOUS at 10:22

## 2017-05-04 RX ADMIN — Medication 2 MG: at 16:08

## 2017-05-04 RX ADMIN — POTASSIUM CHLORIDE 40 MEQ: 20 TABLET, EXTENDED RELEASE ORAL at 22:30

## 2017-05-04 RX ADMIN — DIVALPROEX SODIUM 250 MG: 250 TABLET, DELAYED RELEASE ORAL at 22:30

## 2017-05-04 RX ADMIN — Medication 2 MG: at 02:46

## 2017-05-04 RX ADMIN — ACETAMINOPHEN 650 MG: 325 TABLET ORAL at 19:38

## 2017-05-04 RX ADMIN — Medication 10 ML: at 14:00

## 2017-05-04 RX ADMIN — POTASSIUM CHLORIDE 40 MEQ: 20 TABLET, EXTENDED RELEASE ORAL at 12:29

## 2017-05-04 RX ADMIN — ROSUVASTATIN CALCIUM 5 MG: 5 TABLET ORAL at 22:29

## 2017-05-04 RX ADMIN — VANCOMYCIN HYDROCHLORIDE 1500 MG: 10 INJECTION, POWDER, LYOPHILIZED, FOR SOLUTION INTRAVENOUS at 22:34

## 2017-05-04 RX ADMIN — SERTRALINE HYDROCHLORIDE 50 MG: 50 TABLET ORAL at 09:26

## 2017-05-04 RX ADMIN — INSULIN LISPRO 5 UNITS: 100 INJECTION, SOLUTION INTRAVENOUS; SUBCUTANEOUS at 11:30

## 2017-05-04 RX ADMIN — Medication 10 ML: at 22:32

## 2017-05-04 RX ADMIN — GABAPENTIN 300 MG: 300 CAPSULE ORAL at 09:26

## 2017-05-04 RX ADMIN — ASPIRIN 81 MG: 81 TABLET, COATED ORAL at 09:26

## 2017-05-04 RX ADMIN — LEVOTHYROXINE SODIUM 100 MCG: 100 TABLET ORAL at 06:36

## 2017-05-04 RX ADMIN — ACETAMINOPHEN 650 MG: 325 TABLET ORAL at 00:25

## 2017-05-04 RX ADMIN — GABAPENTIN 300 MG: 300 CAPSULE ORAL at 16:09

## 2017-05-04 NOTE — CONSULTS
Infectious Disease Consultation Note    Requested by: dr. Isidro Chris for possible sepsis, partially treated left hand/foot infection    Current abx Prior abx         Lines:       Assessment :    81-year-old black female with h/o IVDA ( last used IV heroin and cocaine 2  days prior to admission) admitted to [facility] on 4/12/17 with increasing left hand and foot pain.         Recent hospitalization for left foot and left hand cellulitis, abscess. Exact microbial etiology of infection not clear. Patient has been appropriately managed with I&D on 4/17/17. Cultures: strep viridans, cons s/p levofloxacin    Now admitted s/p fall, possible avulsion fracture left ulno humeral joint, low grade fevers    I agree that clinical picture is consistent with SIRS. No evidence of worsening infection of left foot on today's exam. No erythema, swelling or purulent drainage from left hand ulcer per ED report. Clinical suspicion of sepsis is low. SIRS may be due to left elbow fracture, blunt trauma to right knee/ankle.      Antibiotic management complicated due to  life threatening allergy to pcn (anaphylaxis 30 years ago). Non life threatening allergy to moxifloxacin. She has tolerated levofloxacin. Recommendations:     1. Discontinue vancomycin, levofloxacin  2. Ortho consult for left elbow fracture, evaluate left hand (l couldn't evaluate the left hand since it is in a splint/immobilizer)  3. F/u cbc, clinically      Thank you for consultation request. Above plan was discussed in details with patient,  and dr Yasmeen Acevedo. Please call me if any further questions or concerns. Will continue to participate in the care of this patient. HPI:    The patient is a 81-year-old black female with h/o active IVDA admitted to [facility] on 5/2/17 s/p fall. Patient is known to me from recent inpatient hospitalization in 4/2017 when she presented with increasing left hand and foot pain. She had MRI on 4/14 which didn't reveal any abscess. She was seen by ortho surgeon. She eventually developed abscess and underwent I&D of left hand/foot on 4/17/17. Cultures: strep viridans, cons s/p vancomycin/aztreonam followed by levofloxacin. Patient states that she took the antibiotic regularly and was doing fine up until 4 days prior to admission when she tripped on a coffee table and fell. Ceil Stakes onto onto right knee and then left hand to break fall. Patient states she was able to ambulate after but with discomfort. States she thought she'd wait a few days to see if symptoms/pain would improved. But she continued to have increased pain and swelling left elbow, right ankle and hence came to ed on 5/2/17. She had x ray which revealed probable avulsion fracture left elbow, osteonecrosis distal femur/proximal tibia. She had tm:100.9 and was tachycardic on admission. There was concern for sepsis. She was started on levofloxacin, vancomycin. Patient denies headaches, visual disturbances, sore throat, runny nose, earaches, cp, sob, chills, cough, abdominal pain, diarrhea, burning micturition, or weakness in extremities. she denies back pain/flank pain. No h/o recent travel.  No known h/o MRSA colonization or infection in the past.      Past Medical History:   Diagnosis Date    Anxiety     Asthma     Back injury 1986    jumped out of second story window    Behavioral change     Bilateral knee pain     Bipolar disorder (HCC)     Chronic airway obstruction, not elsewhere classified     SOB, on paula O2 Recent admission with psychosis    Chronic back pain     Chronic diastolic heart failure (HCC)     Stable on diuretics    Chronic kidney disease     stage III    Congestive heart failure (HCC)     COPD (chronic obstructive pulmonary disease) ([facility] [address] 10/31/2012    Cramps, muscle, general     Depression     Diabetes mellitus     Difficulty speaking     Difficulty walking     Difficulty writing     DJD (degenerative joint disease) of knee     bilateral, mild    Edema     The edema involves both lower extremities. The episodes last for 1 week. The patient describes this as worsening. Symptoms are exacerbated by prolonged sitting. Symptoms are relieved by leg elevation and diuretics. NO CHANGE    Essential hypertension, benign     Better controlled    Falls frequently     Fatigue     Generalized stiffness     Headache     Heartburn     Hiatal hernia     History of hepatitis C     treated    Hoarseness of voice     Hypothyroidism     Lung disease     Memory difficulty     Morbid obesity (HCC)     Multiple joint pain     Nausea     Numbness and tingling     arms and legs, related to diabetic neuropathy    Osteoarthritis of left knee     Other and unspecified hyperlipidemia     LDL reportedly very high/started on crestor recently by diabetic doctor    Oxygen dependent     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     Sarcoidosis ([facility] [address]     Sickle cell trait ([facility] [address]     Sinusitis     SOB (shortness of breath)     STD (female)     herpes    Swelling of body region     legs and feet    Tinnitus     Venous insufficiency     Vertigo     Weakness generalized     Wears glasses     Weight gain        Past Surgical History:   Procedure Laterality Date    HX BACK SURGERY      HX  SECTION      HX TUBAL LIGATION         home Medication List    Details   oxyCODONE-acetaminophen (PERCOCET) 5-325 mg per tablet Take 1 Tab by mouth every eight (8) hours as needed for Pain. Max Daily Amount: 3 Tabs. Qty: 15 Tab, Refills: 0      umeclidinium-vilanterol (ANORO ELLIPTA) 62.5-25 mcg/actuation inhaler Take 1 Puff by inhalation daily. Qty: 1 Inhaler, Refills: 5      VENTOLIN HFA 90 mcg/actuation inhaler INHALE 2 PUFFS EVERY 4 HOURS AS NEEDED  Qty: 1 Inhaler, Refills: 4      divalproex DR (DEPAKOTE) 250 mg tablet Take 250 mg by mouth nightly. furosemide (LASIX) 20 mg tablet Take 1 Tab by mouth daily.   Qty: 30 Tab, Refills: 0      metOLazone (ZAROXOLYN) 5 mg tablet Take 0.5 Tabs by mouth Every Mon, Wed & Sun.  Qty: 30 Tab, Refills: 6      potassium chloride (KLOR-CON) 20 mEq packet Take 1 Packet by mouth daily. Qty: 30 Each, Refills: 0      clopidogrel (PLAVIX) 75 mg tablet Take 1 Tab by mouth daily. Qty: 30 Tab, Refills: 0      insulin glargine (LANTUS) 100 unit/mL injection 60 units subcutaneously every night  Qty: 1 Vial, Refills: 0      insulin syringe,safetyneedle 1 mL 30 gauge x 5/16\" syrg As directed  Qty: 50 Each, Refills: 0      ARIPiprazole (ABILIFY) 5 mg tablet Take 10 mg by mouth daily. Comments: take 2 tabs each morning      albuterol (PROVENTIL VENTOLIN) 2.5 mg /3 mL (0.083 %) nebulizer solution USE 1 NEB EVERY 4 HOURS FOR WHEEZING AND SHORTNESS OF BREATH  Indications: CHRONIC OBSTRUCTIVE PULMONARY DISEASE  Qty: 2 Package, Refills: 3    Associated Diagnoses: Chronic obstructive pulmonary disease, unspecified COPD type ([facility]      eplerenone (INSPRA) 25 mg tablet Take  by mouth daily. gabapentin (NEURONTIN) 300 mg capsule Take 300 mg by mouth three (3) times daily. acetaminophen (TYLENOL) 325 mg tablet Take 325 mg by mouth every six (6) hours as needed for Pain. traZODone (DESYREL) 100 mg tablet Take 100 mg by mouth nightly. rosuvastatin (CRESTOR) 5 mg tablet Take 1 Tab by mouth nightly. Qty: 30 Tab, Refills: 6      l-methylfolate-vit b12-vit b6 (METANX) 2.8-2-25 mg Tab Take  by mouth. aspirin 81 mg tablet Take 81 mg by mouth daily. Nebulizer Accessories Kit Use with nebulizer machine  Qty: 1 Kit, Refills: prn    Associated Diagnoses: COPD (chronic obstructive pulmonary disease) ([facility])      insulin CONCENTRATED regular (U-500 CONCENTRATED) 500 unit/mL Soln 20 Units by SubCUTAneous route. With breakfast, lunch, and dinner      levothyroxine (SYNTHROID) 100 mcg tablet Take  by mouth Daily (before breakfast). sertraline (ZOLOFT) 100 mg tablet Take 50 mg by mouth daily.       Oxygen-Air Delivery Systems by Nasal route continuous. 2 LNC      ergocalciferol (VITAMIN D) 50,000 unit capsule Take 50,000 Units by mouth daily.              Current Facility-Administered Medications   Medication Dose Route Frequency    vancomycin (VANCOCIN) 500 mg in 0.9% sodium chloride (MBP/ADV) 100 mL MBP  500 mg IntraVENous ONCE    vancomycin (VANCOCIN) 1,500 mg in 0.9% sodium chloride 500 mL IVPB  1,500 mg IntraVENous Q12H    [START ON 5/5/2017] Vancomycin Trough Level Due 5/5 @ 2130  1 Each Other Once per day on Fri    potassium chloride (K-DUR, KLOR-CON) SR tablet 40 mEq  40 mEq Oral TID    acetaminophen (TYLENOL) tablet 650 mg  650 mg Oral Q6H PRN    levoFLOXacin (LEVAQUIN) 750 mg in D5W IVPB  750 mg IntraVENous Q24H    insulin lispro (HUMALOG) injection 5 Units  5 Units SubCUTAneous TIDAC    albuterol-ipratropium (DUO-NEB) 2.5 MG-0.5 MG/3 ML  3 mL Nebulization Q4H PRN    ARIPiprazole (ABILIFY) tablet 10 mg  10 mg Oral DAILY    aspirin delayed-release tablet 81 mg  81 mg Oral DAILY    clopidogrel (PLAVIX) tablet 75 mg  75 mg Oral DAILY    divalproex DR (DEPAKOTE) tablet 250 mg  250 mg Oral QHS    eplerenone (INSPRA) tablet 25 mg  25 mg Oral DAILY    gabapentin (NEURONTIN) capsule 300 mg  300 mg Oral TID    insulin glargine (LANTUS) injection 60 Units  60 Units SubCUTAneous QHS    levothyroxine (SYNTHROID) tablet 100 mcg  100 mcg Oral ACB    rosuvastatin (CRESTOR) tablet 5 mg  5 mg Oral QHS    sertraline (ZOLOFT) tablet 50 mg  50 mg Oral DAILY    traZODone (DESYREL) tablet 100 mg  100 mg Oral QHS    umeclidinium-vilanterol (ANORO ELLIPTA) 62.5 mcg- 25 mcg/inhalation  1 Puff Inhalation DAILY    sodium chloride (NS) flush 5-10 mL  5-10 mL IntraVENous Q8H    sodium chloride (NS) flush 5-10 mL  5-10 mL IntraVENous PRN    morphine injection 2 mg  2 mg IntraVENous Q4H PRN    heparin (porcine) injection 5,000 Units  5,000 Units SubCUTAneous Q8H    insulin lispro (HUMALOG) injection   SubCUTAneous AC&HS       Allergies: Moxifloxacin; Pcn [penicillins]; and Sulfa (sulfonamide antibiotics)    Family History   Problem Relation Age of Onset    Seizures Other     Diabetes Mother     Hypertension Mother     Heart Disease Mother     Cancer Mother     Diabetes Father     Stroke Father     Heart Disease Father     Headache Sister     Depression Neg Hx     Suicide Neg Hx     Psychotic Disorder Neg Hx     Substance Abuse Neg Hx     Dementia Neg Hx      Social History     Social History    Marital status: SINGLE     Spouse name: N/A    Number of children: N/A    Years of education: N/A     Occupational History    Not employed Not Employed     Social History Main Topics    Smoking status: Former Smoker     Packs/day: 0.50     Years: 30.00     Types: Cigarettes     Start date: 1969     Quit date: 10/31/2016    Smokeless tobacco: Never Used      Comment: Per pt. 10 a day     Alcohol use No    Drug use: No      Comment: +Cocaine Screen     Sexual activity: Not on file      Comment:      Other Topics Concern    Not on file     Social History Narrative    Lives with 15year old son. History   Smoking Status    Former Smoker    Packs/day: 0.50    Years: 30.00    Types: Cigarettes    Start date: 1969   Shruthi Soto Quit date: 10/31/2016   Smokeless Tobacco    Never Used     Comment: Per pt. 10 a day         Temp (24hrs), Av.1 °F (37.8 °C), Min:98 °F (36.7 °C), Max:101.3 °F (38.5 °C)    Visit Vitals    /80    Pulse 99    Temp (!) 100.8 °F (38.2 °C)    Resp 20    Ht 5' 5\" (1.651 m)    Wt 95.3 kg (210 lb)    LMP 2012    SpO2 97%    BMI 34.95 kg/m2       ROS: 12 point ROS obtained in details. Pertinent positives as mentioned in HPI,   otherwise negative    Physical Exam:    Constitutional: She is oriented to person, place, and time. She appears well-developed. No distress. Obese. Chronically ill appearing. HENT:   Head: Atraumatic.    Mouth/Throat: Oropharynx is clear and moist. Eyes: EOM are normal. Pupils are equal, round, and reactive to light. Neck: Normal range of motion. Cardiovascular: Regular rhythm and normal heart sounds. No murmur heard. Pulmonary/Chest: Effort normal. She has no rales. Bilateral air entry normal.  Abdominal: Soft. Bowel sounds are normal. There is no tenderness. There is no guarding. Musculoskeletal:   Minimal TTP over anterior right knee and right lateral ankle, distal fibula. Minimal TTP of left wrist. Visualized part of eft hand is puffy including all fingers but soft and non tender. Unable to examine left elbow /forearm, proximal hand since it is in a splint. Small scab on left foot with gauze incorporated in it - removed. Some fresh blood noted underneath. no left foot tenderness. Neurological: She is alert and oriented to person, place, and time. No cranial nerve deficit. Skin: She is not diaphoretic. Psychiatric: She has a normal mood and affect. Nursing note and vitals reviewed.     Labs: Results:   Chemistry Recent Labs      05/04/17   0428  05/02/17   1127  05/02/17   0740   GLU  105*  197*  199*   NA  135*  137  138   K  3.2*  3.5  3.5   CL  100  101  103   CO2  27  27  27   BUN  10  15  18   CREA  0.81  1.07  1.07   CA  8.2*  8.0*  7.8*   AGAP  8  9  8   BUCR  12  14  17      CBC w/Diff Recent Labs      05/02/17   1127  05/02/17   0740  05/01/17   2202   WBC  12.2  10.1  10.3   RBC  4.20  4.14*  5.19   HGB  11.3*  10.9*  14.3   HCT  33.9*  33.3*  41.5   PLT  119*  116*  136   GRANS  75*  71  77*   LYMPH  12*  17*  12*   EOS  1  1  0      Microbiology Recent Labs      05/01/17   2230  05/01/17   2220   CULT  NO GROWTH 3 DAYS  NO GROWTH 3 DAYS          RADIOLOGY:    All available imaging studies/reports in [facility] for this admission were reviewed    Dr. Yovana Quezada, Infectious Disease Specialist  [phone]  May 4, 2017  11:13 AM

## 2017-05-04 NOTE — PROGRESS NOTES
[facility]  Progress Note    Patient: Maryan Sinha Age: 62 y.o. : 1955 MR#: [id number] SSN: [id number]  Date/Time: 2017     Subjective:     Patient denies Fever , Chills , Weight loss or Weight Gain , No SOB, No Cough , no Hemoptysis , No Chest pains , No Palpitations , No NVD . Painful movement of both Upper limbs more over left Elbow to day         Assessment/Plan:   Patient with PMH of DM2, COPD , HTN , Hep C , H/o Drug abuse , recent I&D of left hand and left foot  for abscess, had fall and noted to have fever in ED . Fever + tachycardia +tachypnea. + hyperglycemia +no elevation of LA  And no leucocytosis . 1. Sepsis - SIRS   - Fever , tachycardia , Tachypnea , recent I&D of Left hand and left foot   - recent surgical cultures were positive for  staph Epi - sensitive to Levaquin Rpt blood cultures are  negative so far  - chest xray - no infiltrate   - ID consult appreciated   - Get MRI of Left Hand and left ankle for ? Residual abscess   - discontinue antibiotics - ? SIRS from non infective causes     2 DM2   - Continue current treatments - add meal time rapid insulin   - SSI     3 COPD   - Continue BD     4 Hypothyroid   - Continue replacement     5 Left Elbow - Small possible avulsion fracture from the ulnohumeral articulation.  Donor site  is uncertain.  - Ortho consult     6 Hypokalemia   - replacement ordered     7 X-rays suggestive of   - Necrosis of bones - Right distal femur and proximal Tibia ,     Full CODE       Case discussed with:  [x]Patient  []Family  []Nursing  []Case Management  DVT Prophylaxis:  []Lovenox  [x]Hep SQ  []SCDs  []Coumadin   []On Heparin gtt    Patient Active Problem List   Diagnosis Code    Diabetes mellitus ([facility] [address] [facility] E11.9    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 G62.9    Venous insufficiency I87.2    Morbid obesity ([facility] E66.01    Chronic back pain M54.9, G89.29    Oxygen dependent Z99.81    Hypertension I10    Pain in joint, multiple sites M25.50    Encounter for long-term (current) use of other medications Z79.899    Major depressive disorder, recurrent (HCC) F33.9    Bipolar affective disorder ([facility] [address] F31.9    Other and unspecified hyperlipidemia E78.5    Chronic diastolic heart failure (HCC) I50.32    Chronic airway obstruction, not elsewhere classified J44.9    Essential hypertension, benign I10    Chronic kidney disease N18.9    Depression F32.9    Back injury S39. 92XA    Bilateral knee pain M25.561, M25.562    Anxiety F41.9    Wears glasses Z97.3    History of hepatitis C Z86.19    Sickle cell trait (HCC) D57.3    Acute renal failure (HCC) N17.9    Unspecified hypothyroidism E03.9    Genital herpes A60.00    Sarcoidosis (HCC) B58.3    Metabolic encephalopathy O62.35    Hyponatremia E87.1    Abscess of right arm L02.413    Abdominal pain, epigastric R10.13    Chest pain R07.9    Hypoxemia requiring supplemental oxygen R09.02, Z99.81    Hyperglycemia R73.9    Elevated troponin R74.8    CAP (community acquired pneumonia) J18.9    'Light-for-dates' infant with signs of fetal malnutrition P05.00    Pneumonia J18.9    Abnormal nuclear stress test R94.39    Cellulitis L03.90    Cellulitis and abscess of hand L03.119, L02.519    Cellulitis and abscess of foot L03.119, L02.619    SIRS (systemic inflammatory response syndrome) ([facility]) R65.10       Objective:   VS:   Visit Vitals    /75 (BP 1 Location: Left arm, BP Patient Position: At rest)    Pulse 100    Temp 100.4 °F (38 °C)    Resp 22    Ht 5' 5\" (1.651 m)    Wt 95.3 kg (210 lb)    LMP 2012    SpO2 96%    BMI 34.95 kg/m2      Tmax/24hrs: Temp (24hrs), Av.9 °F (37.7 °C), Min:98 °F (36.7 °C), Max:101.3 °F (38.5 °C)  IOBRIEFNo intake or output data in the 24 hours ending 17 1304    General:  Alert, cooperative, no acute distress   HEENT:  NC, Atraumatic. PERRLA, anicteric sclerae.   Pulmonary:  CTA Bilaterally. No Wheezing/Rhonchi/Rales. Cardiovascular: Regular rate and Rhythm. GI:  Soft, Non distended, Non tender. + Bowel sounds. Extremities: left arm bandaged    Psych:  Not anxious or agitated. Neurologic: Grossly - Motor and Sensory functions are intact .  No Anxiety , calm , no Agitation         Medications:   Current Facility-Administered Medications   Medication Dose Route Frequency    potassium chloride (K-DUR, KLOR-CON) SR tablet 40 mEq  40 mEq Oral TID    acetaminophen (TYLENOL) tablet 650 mg  650 mg Oral Q6H PRN    levoFLOXacin (LEVAQUIN) 750 mg in D5W IVPB  750 mg IntraVENous Q24H    albuterol-ipratropium (DUO-NEB) 2.5 MG-0.5 MG/3 ML  3 mL Nebulization Q4H PRN    ARIPiprazole (ABILIFY) tablet 10 mg  10 mg Oral DAILY    aspirin delayed-release tablet 81 mg  81 mg Oral DAILY    clopidogrel (PLAVIX) tablet 75 mg  75 mg Oral DAILY    divalproex DR (DEPAKOTE) tablet 250 mg  250 mg Oral QHS    eplerenone (INSPRA) tablet 25 mg  25 mg Oral DAILY    gabapentin (NEURONTIN) capsule 300 mg  300 mg Oral TID    insulin glargine (LANTUS) injection 60 Units  60 Units SubCUTAneous QHS    levothyroxine (SYNTHROID) tablet 100 mcg  100 mcg Oral ACB    rosuvastatin (CRESTOR) tablet 5 mg  5 mg Oral QHS    sertraline (ZOLOFT) tablet 50 mg  50 mg Oral DAILY    traZODone (DESYREL) tablet 100 mg  100 mg Oral QHS    umeclidinium-vilanterol (ANORO ELLIPTA) 62.5 mcg- 25 mcg/inhalation  1 Puff Inhalation DAILY    sodium chloride (NS) flush 5-10 mL  5-10 mL IntraVENous Q8H    sodium chloride (NS) flush 5-10 mL  5-10 mL IntraVENous PRN    morphine injection 2 mg  2 mg IntraVENous Q4H PRN    heparin (porcine) injection 5,000 Units  5,000 Units SubCUTAneous Q8H    insulin lispro (HUMALOG) injection   SubCUTAneous AC&HS       Labs:    Recent Results (from the past 24 hour(s))   GLUCOSE, POC    Collection Time: 05/03/17  4:15 PM   Result Value Ref Range    Glucose (POC) 188 (H) 70 - 110 mg/dL   GLUCOSE, POC Collection Time: 05/03/17  9:37 PM   Result Value Ref Range    Glucose (POC) 110 70 - 110 mg/dL   Claiborne Alpers    Collection Time: 05/04/17  4:28 AM   Result Value Ref Range    Vancomycin,trough 6.7 (L) 10.0 - 20.0 ug/mL    Reported dose date: 20170503      Reported dose time: 1141      Reported dose: 1250 MG UNITS   METABOLIC PANEL, BASIC    Collection Time: 05/04/17  4:28 AM   Result Value Ref Range    Sodium 135 (L) 136 - 145 mmol/L    Potassium 3.2 (L) 3.5 - 5.5 mmol/L    Chloride 100 100 - 108 mmol/L    CO2 27 21 - 32 mmol/L    Anion gap 8 3.0 - 18 mmol/L    Glucose 105 (H) 74 - 99 mg/dL    BUN 10 7.0 - 18 MG/DL    Creatinine 0.81 0.6 - 1.3 MG/DL    BUN/Creatinine ratio 12 12 - 20      GFR est AA >60 >60 ml/min/1.73m2    GFR est non-AA >60 >60 ml/min/1.73m2    Calcium 8.2 (L) 8.5 - 10.1 MG/DL       Signed By: Lainey Meade MD     May 4, 2017

## 2017-05-04 NOTE — PROGRESS NOTES
[facility]    Dx:  1. Right elbow cellulitis/olecranon bursitis: DC PLavix for now (just in case surgery is needed in a few days if swelling worsens   Right elbow xray 5/4/2017   2. Left elbow contusion: possible hairline Fx left elbow: CT scan to assess capitellum of humerus 5/4/2017   3. Left dorsal hand wound and h/o MRSA left dorsal hand: aqua cell AG and then sterile dressings  4. Left foot MRSA wound: wound care  5. Right ankle pain: mild swelling: xray right ankle  (negative fx)      Continue AB for now.     Madie Caballero MD  5/4/2017  2:29 PM

## 2017-05-04 NOTE — CDMP QUERY
Khushi Cortes" is listed in [address] [place] [address]. Please specify. =>Asthma   Severity (mild, moderate, severe)   Intermittent or Persistent  =>Other Explanation of clinical findings  =>Unable to Determine (no explanation of clinical findings)    The medical record reflects the following:  Risk:   --PMH of asthma, COPD, CHF, oxygen dependent    Clinical Indicators:  --H&P states: RS: CTA, No wheezing or crackles    Treatment:   --receiving Duoneb    Please clarify and document your clinical opinion in the progress notes and discharge summary including the definitive and/or presumptive diagnosis, (suspected or probable), related to the above clinical findings. Please include clinical findings supporting your diagnosis. If you DECLINE this query or would like to communicate with [employer], please utilize the \"[employer] message box\" at the TOP of the Progress Note on the right.       Thank you,  Nupur Betancur [address] [place] [address]

## 2017-05-04 NOTE — CDMP QUERY
Please clarify if this patient is being treated/managed for:    =>Chronic respiratory failure   Hypoxic   Hypercapnic  =>Other Explanation of clinical findings  =>Unable to Determine (no explanation of clinical findings)    The medical record reflects the following:  Risk:  --PMH: COPD, asthma, oxygen dependent    Clinical Indicators:  --H&P states: RS: CTA, No wheezing or crackles    Treatment:   --receiving oxygen by nasal cannula  --receiving Duonebs    Please clarify and document your clinical opinion in the progress notes and discharge summary including the definitive and/or presumptive diagnosis, (suspected or probable), related to the above clinical findings. Please include clinical findings supporting your diagnosis. If you DECLINE this query or would like to communicate with [employer], please utilize the \"[employer] message box\" at the TOP of the Progress Note on the right.       Thank you,  Trinidad Messer [facility]

## 2017-05-04 NOTE — CONSULTS
[address]    Name:  Bi Bland  MR#:  [id number]  :  1955  Account #:  [de-identified]  Date of Adm:  2017  Date of Consultation:  2017      REASON FOR CONSULTATION  1. Followup care, having had an MRSA abscess to the dorsal part of  her left hand, as well as an MRSA infection, dorsal part of her left foot,  self-induced, from IV drug abuse, from prior self-inflicted drug  injections in 2017.  2. Left elbow contusion, possible fracture of the left elbow. On my  review of the x-ray, there may be a possible hairline fracture of the  capitellum, but no gross effusion. Recommendations for a CT scan of  left elbow. 3. Left dorsal hand wound, healing. Recommendations: Continued  wound care, Aquacel AG ribbon strip to the dorsal wound, then sterile  dressings. 4. Healing wound, dorsal part of left foot. Continued wound care dorsal  part of left foot. No abscess or pus seen to the left foot at this time. 5. Right olecranon bursitis, right elbow cellulitis, profound. Recommendation: Stopping the Plavix for now just in case she needs  surgery in a few days in case a septic olecranon bursitis develops. Recommendation to continue the antibiotics. 6. Right ankle pain with some very mild swelling to the right ankle. No  gross signs of infection to the right ankle. Recommendations: X-rays of  the right ankle. CLINICAL HISTORY: The patient is a 52-year-old female who I met for  the first time in 2017. Back in 2017, she was admitted to the  hospital and orthopedic consultation was requested to assess her  dorsal left hand, dorsal left foot, as she was self-inflicting, self-injected  her left dorsal hand and left foot. She has a history of heroin and IV  cocaine abuse. During her hospital stay, she underwent operative  irrigation and debridement of her left hand and left foot.  Each area of  the left dorsal hand, left dorsal foot grew out MRSA infection. She was  treated with IV antibiotics, as well as diligent wound care. At this  current time, she is receiving Aquacel AG ribbon strips to the dorsal  part of her left hand, as well as a dry dressing to her left foot. In the interim, while at home, yesterday she was at home, she states  she stumbled and tripped over caught her coffee table. She states she  injured her right elbow and left elbow. She states she was seen in the  emergency room, for her left elbow, placed in a posterior long-arm  splint, with suspicion of a left elbow fracture, but she also complains of  right elbow pain. She states her right elbow pain worsened when she  was being moved in the emergency room, but she did fall at all. ALLERGIES  1. SHE HAS AN ALLERGY TO PENICILLIN, WHICH SHE HAD  ANAPHYLACTIC REACTION 30 YEARS AGO TO THE PENICILLIN  FAMILY. 2. SHE HAS A NON-LIFE-THREATENING ALLERGY TO  MOXIFLOXACIN. 3. SHE HAS TOLERATED LEVOFLOXACIN. She was seen by the infectious disease specialist, Dr. Prince Mccormick,  whose recommendations have been to discontinue the vancomycin,  discontinue levofloxacin. Chief complaint remains left elbow pain, right elbow pain. She had x-rays of her left elbow, which I did review. These x-rays were  done actually on image date 05/01/2017. These are x-rays showed a  questionable small density adjacent to the ulnar humeral articulation  medially, and it was felt to represent a small capsular avulsion, the  origin is uncertain. When I looked at the x-rays, there was no evidence  of anterior or posterior fat pad, but in looking at the lateral radiographic  x-ray, there is a linear line that went from the posterior humerus toward  the anterior humerus, possibly representing an occult fracture to the  capitellum. As such, recommendation, in my opinion, is going to be for  a CT scan of the left elbow.     She had left wrist x-rays also, which I did review, and these x-rays  were done in the emergency room on 05/01/2017. These x-rays of her  left wrist revealed no acute fracture or dislocation. No osteolytic or  osteoblastic lesions were seen. The joint spaces intra-articular were  still well maintained to this left wrist. There are some degenerative  changes across the hamate to the third metacarpal CMC region. No  evidence of scapholunate disassociation. No evidence of a scaphoid  fracture. Right knee x-rays, 3 views, also obtained on 05/01/2017: These x-rays  of her right knee I did review the report, as well as the images. These  studies done on 05/01/2017 revealed degenerative changes to the  proximal tibial plateau region and to the distal femur, felt to represent  osteonecrosis. There are moderate degenerative changes and a small  effusion to the right knee. Most of her degenerative changes are seen  in the patellofemoral joint articulation, best seen in lateral radiographic  x-ray. There is peripheral osteophytosis superior pole of the patella  and the inferior pole of the patella. There is intramedullary abnormal  signal seen in the distal femur, as well as the proximal tibia, that looks  like a bony infarct, but no acute fracture seen to the left knee, distal  femur, in particular. In the proximal tibia, there is a lucency seen in the  proximal tibial plateau region. She actually had x-rays of her right ankle as well, and I did review  these x-rays as well as reviewed the report. The radiologist's  impression was likely distal and proximal tibial osteonecrosis. On my  viewing, there are just 2 views of the right ankle. These ankle films  revealed that the ankle joint is still well maintained. No osteolytic or  osteoblastic lesions are seen. There is some soft tissue swelling seen  medially and laterally, but no effusion seen to the anterior portion of the  ankle joint.  There is what looks like a chondroid density in the distal  tibia, a bone spur at the anterior portion of the distal tibia, bone spurs  at the dorsal part of the navicular bones was seen into the dorsal part  of the navicular, bone spurs seen at the dorsal part of the navicular  cuneiform joint, a calcific bone spur at the insertion of the Achilles  tendon, but no gas in the soft tissues about the right ankle. It is noted that she does take Plavix. PAST MEDICAL HISTORY: Significant for diabetes mellitus type 2,  COPD, hypertension, hepatitis C, chronic diastolic congestive heart  failure, bipolar disorder, IV drug abuse. Again, she presented after a  fall while at home. She complained of worsening left hand pain, as well  as left foot pain. Significant for asthma, anxiety, back injury, behavioral  change, bilateral knee pain, bipolar disorder, COPD, chronic diastolic  heart failure, chronic kidney disease, COPD, cramps, difficulty walking,  degenerative osteophytic changes of her knees, lung  disease, sarcoidosis, sickle cell trait, history of sinusitis in the past,  history of vertigo. PAST SURGICAL HISTORY: Back surgery in ,  remote,  tubal ligation remote. SOCIAL HISTORY: The patient is a former smoker, for which at the  time she smokes at least a half pack of cigarettes a day for 30 years. She quit in 2016. She never used smokeless tobacco, does not drink  alcohol, but she does use IV drugs, cocaine. FAMILY HISTORY: Positive for seizures, diabetes, hypertension, heart  disease, cancer, stroke, depression, suicide disorder, psychiatric  psychotic disorder, substance abuse, and dementia. ALLERGIES  1. SHE IS ALLERGIC TO PENICILLIN. THIS CREATES  ANAPHYLAXIS. 2. SHE IS ALLERGIC TO SULFA MEDICATIONS. 3. ALLERGIC TO MOXIFLOXACIN, CREATES A RASH. MEDICATIONS  Prior to admission include:  1. Percocet 5/325 mg 1 p.o. every 8 hours. 2. Ventolin inhaler. 3. Depakote 250 mg 1 p.o. at bedtime. 4. Lasix 20 mg 1 p.o. each day. 5. Zaroxolyn 0.5 mg 1 p.o. every Monday, Wednesday and Friday.   6. Potassium supplementation 20 mEq 1 p.o. each day. 7. Plavix 75 mg 1 p.o. each day. 8. Lantus insulin 60 units subcu every night. 9. Albuterol inhaler, 1 neb every 4 hours p.r.n. for wheezing and  shortness of breath. 10. Inspra 25 mg 1 p.o. at bedtime. 11. Neurontin 300 mg, 1 p.o. 3 times a day. 12. Desyrel 100 mg 1 p.o. at bedtime. 13. Crestor 5 mg 1 p.o. each day. 14. Multivitamin. 15. B complex, Metanx, 1 p.o. each day. 16. Aspirin 81 mg 1 p.o. each day. 17. Synthroid 100 mcg, 1 p.o. every morning before breakfast.  18. Zoloft 50 mg 1 p.o. at bedtime. 19. Vitamin D 50,000 units, 1 p.o. q.week. It is listed as q.day but I  think it may be q.week. REVIEW OF SYSTEMS  CONSTITUTIONAL: She denied any fevers, shakes, chills, night  sweats. ENT: No difficulty swallowing. CARDIOVASCULAR: No chest pain, chest palpitations, syncope, or  PND. RESPIRATORY: No pleuritic chest pain, productive sputum, or  productive sputum. GASTROINTESTINAL: No abdominal pain, nausea, vomiting,  diarrhea, hematemesis, rectal bleeding. NEUROLOGIC: No focal weakness or headaches or seizures. MUSCULOSKELETAL: Positive for left elbow pain after a fall. Right  elbow pain: She states right elbow pain, began in the emergency room,  after the staff was moving her in the emergency room, and this is her  report, but see HPI please. GENITOURINARY: No hematuria or dysuria. SKIN: She denies any history of rash, although examining her today,  she has redness to her right elbow, has a classic cellulitis, right elbow,  and olecranon bursitis. PHYSICAL EXAMINATION  VITAL SIGNS: Temperature is 100.4 at 11:30 a.m. today, pulse rate  100, 125/75 blood pressure, respirations 22, 96% saturation on room  air. GENERAL: The patient is alert, follows commands, in no acute  distress. Her affect and mood are appropriate. She is lying supine in  front of me.   EXTREMITIES: She has a posterior long-arm splint, that is on her left  upper extremity, then I easily removed. In removing this, she has just  slight swelling to the left elbow. I can flex and extend her elbow, to  about a 40-degree range of motion, slowly because she has some  discomfort when I palpate firmly at the radial neck. There is no  bogginess to her left elbow. She has pain supination and pronation of  her left elbow. Her radial artery pulses were intact on her left hand. Her  fingers were nice and pink. She had a dorsal wound to the dorsal part  of her left hand that is very small, that was packed with Aquacel. There was no pus that was draining from the this. The plan at this time  is to replace the Aquacel dressing of her left hand, and then place a  Mepilex type bandage to her left hand. She is able to flex and extend  her fingers quite well. There is no evidence of flexor or extensor  tenosynovitis. She has a nontender scaphoid of her left wrist.    Left shoulder, she is nontender to left proximal humerus, nontender to  left humeral shaft. Right upper extremity: She has redness to right elbow, with a red  cellulitis to her right elbow, distal humerus, going down to the proximal  one-third of the forearm. She has some very mild tenderness to the  olecranon bursa. It is not boggy. It is slightly swollen, but I cannot  palpate really a fluid collection but there is definitely some swelling in  the olecranon bursal region. On my evaluation, it is mild, but is firmer  swelling then more of a fluid wave swelling. I could supinate and  pronate her left elbow without any major discomfort. When I flex her  elbow, it does cause her discomfort, along the posterior humerus,  where she has some redness also. She can otherwise flex and extend  all of her fingers of her right hand, and good coloration to all her  fingers, good pulses to her right hand, nontender flexor and extensor  tendons of her right hand. Right knee: There is no effusion to the right knee, no redness or  erythema.  There is some patellofemoral crepitus. She has some mild  tenderness to the patella tendon. She is nontender to her right tibia-fibula, nontender right femur, right  thigh nontender. Right ankle: She has some slight swelling along the lateral fibula, very  slight warmth to the lateral fibula. She is nontender when I range her  ankle with dorsiflexion, plantar flexion. There is no gross effusion to  right ankle joint. Her pulses are intact to her right foot. Her toes are  nice and warm and pink. There is good coloration too all of her toes. She can flex and extend her toes quite well. OVERALL IMPRESSION  1. The patient is a 81-year-old female, is well known to me. She has a  history of IV drug abuse, self-inflicted, with a methicillin-resistant  Staphylococcus aureus to her left hand and left foot. Recommendations: Wound care for her left hand and left foot. No  surgical intervention required at this current time. 2. Right olecranon bursitis, as well as a cellulitis, right elbow: I  recommend antibiotics. I will talk to Dr. Tess Oejda regarding this  patient's right elbow. Recommendations: Stop the Plavix just in case in  a few days should her olecranon bursitis and elbow cellulitis not  improve, and should she develop an infected bursitis, then she may  need aspiration and/or surgical intervention for excision of the  olecranon bursa. At this current time, I do not believe she requires an  excision of her olecranon bursa, at it is somewhat firm, slightly swollen,  but she is much more tender over the erythematous area of her distal  humerus and proximal forearm, where she has obvious cellulitis of  her right elbow. PLAN: X-rays of her right elbow.         Reji Ortiz MD    [address] / [place]  D:  05/04/2017   14:51  T:  05/04/2017   17:01  Job #:  [id number]

## 2017-05-04 NOTE — PROGRESS NOTES
[facility]      I spoke with Dr Tess Ojeda (ID). We both agree for additional test right elbow. CT right elbow: assess as Stephen Sinha has cellulitis right elbow      1. Right elbow cellulitis: RN to jono right elbow region of cellulitis  2.  Restart Vancomycin: I ordered this MD Sukhi Galindo MD

## 2017-05-05 ENCOUNTER — APPOINTMENT (OUTPATIENT)
Dept: GENERAL RADIOLOGY | Age: 62
DRG: 383 | End: 2017-05-05
Attending: INTERNAL MEDICINE
Payer: MEDICAID

## 2017-05-05 LAB
ANION GAP BLD CALC-SCNC: 8 MMOL/L (ref 3–18)
BUN SERPL-MCNC: 11 MG/DL (ref 7–18)
BUN/CREAT SERPL: 12 (ref 12–20)
CALCIUM SERPL-MCNC: 8.7 MG/DL (ref 8.5–10.1)
CHLORIDE SERPL-SCNC: 102 MMOL/L (ref 100–108)
CO2 SERPL-SCNC: 26 MMOL/L (ref 21–32)
CREAT SERPL-MCNC: 0.89 MG/DL (ref 0.6–1.3)
GLUCOSE BLD STRIP.AUTO-MCNC: 162 MG/DL (ref 70–110)
GLUCOSE BLD STRIP.AUTO-MCNC: 163 MG/DL (ref 70–110)
GLUCOSE BLD STRIP.AUTO-MCNC: 187 MG/DL (ref 70–110)
GLUCOSE BLD STRIP.AUTO-MCNC: 93 MG/DL (ref 70–110)
GLUCOSE SERPL-MCNC: 104 MG/DL (ref 74–99)
POTASSIUM SERPL-SCNC: 4.3 MMOL/L (ref 3.5–5.5)
SODIUM SERPL-SCNC: 136 MMOL/L (ref 136–145)

## 2017-05-05 PROCEDURE — 65270000029 HC RM PRIVATE

## 2017-05-05 PROCEDURE — 74011250636 HC RX REV CODE- 250/636: Performed by: INTERNAL MEDICINE

## 2017-05-05 PROCEDURE — 36415 COLL VENOUS BLD VENIPUNCTURE: CPT | Performed by: INTERNAL MEDICINE

## 2017-05-05 PROCEDURE — 82962 GLUCOSE BLOOD TEST: CPT

## 2017-05-05 PROCEDURE — 77030027138 HC INCENT SPIROMETER -A

## 2017-05-05 PROCEDURE — 77010033678 HC OXYGEN DAILY: Performed by: INTERNAL MEDICINE

## 2017-05-05 PROCEDURE — 80048 BASIC METABOLIC PNL TOTAL CA: CPT | Performed by: INTERNAL MEDICINE

## 2017-05-05 PROCEDURE — 71010 XR CHEST PORT: CPT

## 2017-05-05 PROCEDURE — 74011636637 HC RX REV CODE- 636/637: Performed by: INTERNAL MEDICINE

## 2017-05-05 PROCEDURE — 74011250637 HC RX REV CODE- 250/637: Performed by: INTERNAL MEDICINE

## 2017-05-05 RX ADMIN — INSULIN LISPRO 3 UNITS: 100 INJECTION, SOLUTION INTRAVENOUS; SUBCUTANEOUS at 12:39

## 2017-05-05 RX ADMIN — ACETAMINOPHEN 650 MG: 325 TABLET ORAL at 06:27

## 2017-05-05 RX ADMIN — GABAPENTIN 300 MG: 300 CAPSULE ORAL at 08:41

## 2017-05-05 RX ADMIN — HEPARIN SODIUM 5000 UNITS: 5000 INJECTION, SOLUTION INTRAVENOUS; SUBCUTANEOUS at 18:16

## 2017-05-05 RX ADMIN — EPLERENONE 25 MG: 25 TABLET, FILM COATED ORAL at 08:41

## 2017-05-05 RX ADMIN — LEVOFLOXACIN 750 MG: 5 INJECTION, SOLUTION INTRAVENOUS at 12:44

## 2017-05-05 RX ADMIN — ROSUVASTATIN CALCIUM 5 MG: 5 TABLET ORAL at 21:20

## 2017-05-05 RX ADMIN — HEPARIN SODIUM 5000 UNITS: 5000 INJECTION, SOLUTION INTRAVENOUS; SUBCUTANEOUS at 11:33

## 2017-05-05 RX ADMIN — INSULIN LISPRO 2 UNITS: 100 INJECTION, SOLUTION INTRAVENOUS; SUBCUTANEOUS at 17:33

## 2017-05-05 RX ADMIN — INSULIN GLARGINE 60 UNITS: 100 INJECTION, SOLUTION SUBCUTANEOUS at 21:21

## 2017-05-05 RX ADMIN — Medication 2 MG: at 21:19

## 2017-05-05 RX ADMIN — ARIPIPRAZOLE 10 MG: 10 TABLET ORAL at 08:41

## 2017-05-05 RX ADMIN — Medication 10 ML: at 13:05

## 2017-05-05 RX ADMIN — GABAPENTIN 300 MG: 300 CAPSULE ORAL at 17:33

## 2017-05-05 RX ADMIN — ASPIRIN 81 MG: 81 TABLET, COATED ORAL at 08:41

## 2017-05-05 RX ADMIN — LEVOTHYROXINE SODIUM 100 MCG: 100 TABLET ORAL at 08:41

## 2017-05-05 RX ADMIN — Medication 2 MG: at 09:43

## 2017-05-05 RX ADMIN — Medication 2 MG: at 13:46

## 2017-05-05 RX ADMIN — SERTRALINE HYDROCHLORIDE 50 MG: 50 TABLET ORAL at 08:41

## 2017-05-05 RX ADMIN — Medication 10 ML: at 06:28

## 2017-05-05 RX ADMIN — VANCOMYCIN HYDROCHLORIDE 1500 MG: 10 INJECTION, POWDER, LYOPHILIZED, FOR SOLUTION INTRAVENOUS at 21:20

## 2017-05-05 RX ADMIN — GABAPENTIN 300 MG: 300 CAPSULE ORAL at 21:20

## 2017-05-05 RX ADMIN — Medication 2 MG: at 06:27

## 2017-05-05 RX ADMIN — DIVALPROEX SODIUM 250 MG: 250 TABLET, DELAYED RELEASE ORAL at 21:21

## 2017-05-05 RX ADMIN — Medication 2 MG: at 17:33

## 2017-05-05 RX ADMIN — Medication 10 ML: at 21:22

## 2017-05-05 RX ADMIN — INSULIN LISPRO 2 UNITS: 100 INJECTION, SOLUTION INTRAVENOUS; SUBCUTANEOUS at 21:35

## 2017-05-05 RX ADMIN — VANCOMYCIN HYDROCHLORIDE 1500 MG: 10 INJECTION, POWDER, LYOPHILIZED, FOR SOLUTION INTRAVENOUS at 10:11

## 2017-05-05 RX ADMIN — Medication 2 MG: at 02:55

## 2017-05-05 RX ADMIN — TRAZODONE HYDROCHLORIDE 100 MG: 100 TABLET ORAL at 21:21

## 2017-05-05 NOTE — ROUTINE PROCESS
Bedside shift change report given to JENNY Potter (oncoming nurse) by Silvio Carlson (offgoing nurse). Report included the following information SBAR, Kardex, MAR and Recent Results.

## 2017-05-05 NOTE — PROGRESS NOTES
[facility] Hospitalist Group  Progress Note    Patient: Shaw Lima Age: 62 y.o. : 1955 MR#: [id number] SSN: [id number]  Date/Time: 2017     Subjective:     Her main concern is pain over limbs   No CP No SOB No cough       Assessment/Plan:   Patient with PMH of DM2, COPD , HTN , Hep C , H/o Drug abuse , recent I&D of left hand and left foot  for abscess, had fall and noted to have fever in ED . Fever + tachycardia +tachypnea. + hyperglycemia +no elevation of LA  And no leucocytosis . 1. Sepsis - SIRS   - Fever , tachycardia , Tachypnea , recent I&D of Left hand and left foot   - recent surgical sites are clean , no evidence of reinfection   - still having temp   - infective Vs Non infective etiology of SIRS   - continue IV antibiotics for now   D/W ID   - follow chest xray     2 DM2   - Continue current treatments - add meal time rapid insulin   - SSI     3 COPD   - Continue BD     4 Hypothyroid   - Continue replacement     5 Left Elbow - Small possible avulsion fracture from the ulnohumeral articulation.  Donor site  is uncertain.  - Ortho consult     6 Hypokalemia   - replacement ordered     7 X-rays suggestive of   - Necrosis of bones - Right distal femur and proximal Tibia ,     Full CODE       Case discussed with:  [x]Patient  [x]Family  []Nursing  []Case Management  DVT Prophylaxis:  []Lovenox  [x]Hep SQ  []SCDs  []Coumadin   []On Heparin gtt    Patient Active Problem List   Diagnosis Code    Diabetes mellitus ([facility] [address] E11.9    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 G62.9    Venous insufficiency I87.2    Morbid obesity (HCC) E66.01    Chronic back pain M54.9, G89.29    Oxygen dependent Z99.81    Hypertension I10    Pain in joint, multiple sites M25.50    Encounter for long-term (current) use of other medications Z79.899    Major depressive disorder, recurrent ([facility]) F33.9    Bipolar affective disorder ([facility] [address] F31.9    Other and unspecified hyperlipidemia E78.5    Chronic diastolic heart failure (HCC) I50.32    Chronic airway obstruction, not elsewhere classified J44.9    Essential hypertension, benign I10    Chronic kidney disease N18.9    Depression F32.9    Back injury S39. 92XA    Bilateral knee pain M25.561, M25.562    Anxiety F41.9    Wears glasses Z97.3    History of hepatitis C Z86.19    Sickle cell trait (HCC) D57.3    Acute renal failure (HCC) N17.9    Unspecified hypothyroidism E03.9    Genital herpes A60.00    Sarcoidosis (HCC) B98.8    Metabolic encephalopathy T68.50    Hyponatremia E87.1    Abscess of right arm L02.413    Abdominal pain, epigastric R10.13    Chest pain R07.9    Hypoxemia requiring supplemental oxygen R09.02, Z99.81    Hyperglycemia R73.9    Elevated troponin R74.8    CAP (community acquired pneumonia) J18.9    'Light-for-dates' infant with signs of fetal malnutrition P05.00    Pneumonia J18.9    Abnormal nuclear stress test R94.39    Cellulitis L03.90    Cellulitis and abscess of hand L03.119, L02.519    Cellulitis and abscess of foot L03.119, L02.619    SIRS (systemic inflammatory response syndrome) ([facility]) R65.10    Cellulitis of right elbow L03. 113    Olecranon bursitis of right elbow M70.21    Contusion of elbow S50.00XA       Objective:   VS:   Visit Vitals    BP 96/63 (BP 1 Location: Left arm, BP Patient Position: At rest)    Pulse 94    Temp 99.5 °F (37.5 °C)    Resp 20    Ht 5' 5\" (1.651 m)    Wt 95.3 kg (210 lb)    LMP 2012    SpO2 100%    BMI 34.95 kg/m2      Tmax/24hrs: Temp (24hrs), Av.3 °F (37.9 °C), Min:99.1 °F (37.3 °C), Max:101.3 °F (38.5 °C)  IOBRIEF    Intake/Output Summary (Last 24 hours) at 17 1438  Last data filed at 17 1354   Gross per 24 hour   Intake              560 ml   Output                0 ml   Net              560 ml       General:  Alert, cooperative, no acute distress   HEENT:  NC, Atraumatic. PERRLA, anicteric sclerae.   Pulmonary:  CTA Bilaterally. No Wheezing/Rhonchi/Rales. Cardiovascular: Regular rate and Rhythm. GI:  Soft, Non distended, Non tender. + Bowel sounds. Extremities: left arm bandaged    Psych:  Not anxious or agitated. Neurologic: Grossly - Motor and Sensory functions are intact .  No Anxiety , calm , no Agitation         Medications:   Current Facility-Administered Medications   Medication Dose Route Frequency    [START ON 5/6/2017] VANCOMYCIN TROUGH DUE  1 Each Other Daily    vancomycin (VANCOCIN) 1,500 mg in 0.9% sodium chloride 500 mL IVPB  1,500 mg IntraVENous Q12H    acetaminophen (TYLENOL) tablet 650 mg  650 mg Oral Q6H PRN    albuterol-ipratropium (DUO-NEB) 2.5 MG-0.5 MG/3 ML  3 mL Nebulization Q4H PRN    ARIPiprazole (ABILIFY) tablet 10 mg  10 mg Oral DAILY    aspirin delayed-release tablet 81 mg  81 mg Oral DAILY    divalproex DR (DEPAKOTE) tablet 250 mg  250 mg Oral QHS    eplerenone (INSPRA) tablet 25 mg  25 mg Oral DAILY    gabapentin (NEURONTIN) capsule 300 mg  300 mg Oral TID    insulin glargine (LANTUS) injection 60 Units  60 Units SubCUTAneous QHS    levothyroxine (SYNTHROID) tablet 100 mcg  100 mcg Oral ACB    rosuvastatin (CRESTOR) tablet 5 mg  5 mg Oral QHS    sertraline (ZOLOFT) tablet 50 mg  50 mg Oral DAILY    traZODone (DESYREL) tablet 100 mg  100 mg Oral QHS    umeclidinium-vilanterol (ANORO ELLIPTA) 62.5 mcg- 25 mcg/inhalation  1 Puff Inhalation DAILY    sodium chloride (NS) flush 5-10 mL  5-10 mL IntraVENous Q8H    sodium chloride (NS) flush 5-10 mL  5-10 mL IntraVENous PRN    morphine injection 2 mg  2 mg IntraVENous Q4H PRN    heparin (porcine) injection 5,000 Units  5,000 Units SubCUTAneous Q8H    insulin lispro (HUMALOG) injection   SubCUTAneous AC&HS       Labs:    Recent Results (from the past 24 hour(s))   GLUCOSE, POC    Collection Time: 05/04/17  9:39 PM   Result Value Ref Range    Glucose (POC) 150 (H) 70 - 873 mg/dL   METABOLIC PANEL, BASIC    Collection Time: 05/05/17 4: 07 AM   Result Value Ref Range    Sodium 136 136 - 145 mmol/L    Potassium 4.3 3.5 - 5.5 mmol/L    Chloride 102 100 - 108 mmol/L    CO2 26 21 - 32 mmol/L    Anion gap 8 3.0 - 18 mmol/L    Glucose 104 (H) 74 - 99 mg/dL    BUN 11 7.0 - 18 MG/DL    Creatinine 0.89 0.6 - 1.3 MG/DL    BUN/Creatinine ratio 12 12 - 20      GFR est AA >60 >60 ml/min/1.73m2    GFR est non-AA >60 >60 ml/min/1.73m2    Calcium 8.7 8.5 - 10.1 MG/DL   GLUCOSE, POC    Collection Time: 05/05/17  8:27 AM   Result Value Ref Range    Glucose (POC) 93 70 - 110 mg/dL   GLUCOSE, POC    Collection Time: 05/05/17 11:50 AM   Result Value Ref Range    Glucose (POC) 162 (H) 70 - 110 mg/dL       Signed By: Mt Hoskins MD     May 5, 2017

## 2017-05-05 NOTE — PROGRESS NOTES
THERESA [facility] resting at this time. On examination 5/5/2017 , the patient is alert, oriented (name, place, time) and follows commands. she is in no acute distress and her affect and mood are appropriate. Visit Vitals    /67 (BP 1 Location: Left arm, BP Patient Position: At rest)    Pulse 100    Temp (!) 101.3 °F (38.5 °C)    Resp 20    Ht 5' 5\" (1.651 m)    Wt 210 lb (95.3 kg)    SpO2 92%    BMI 34.95 kg/m2             Dx    1. Left dorsal hand wound: no abscess (wound care)  2. left dorsal foot dry eschar from prior wound: dry dressings  3. Left elbow contusion: My viewing of CT: no Fx to lateral column: await official RAD read. 4. right elbow cellulitis: My read of right elbow CT: superficial soft tissue swelling posterior to the distal humerus.           No fluid collection seen to olecranon bursa region. (await official RAD read.)      Right elbow: still with erythema, no fluctuance to olecrani bursae at this time  Left elbow: less tenderness this AM. NO erythema      Will Brice MD  5/5/2017  7:12 AM

## 2017-05-05 NOTE — PROGRESS NOTES
[facility]     Left elbow: no acute fx:  Right elbow: cellulitis, no abscess at this time. Plan:  IV AB (Vanco for now).   Left dorsal and wound care      Tawana Sinclair MD  5/5/2017  6:46 PM

## 2017-05-05 NOTE — PROGRESS NOTES
Infectious Disease Consultation Note    Requested by: dr. Amy Lopez for possible sepsis, partially treated left hand/foot infection    Current abx Prior abx         Lines:       Assessment :    62-year-old black female with h/o IVDA ( last used IV heroin and cocaine 2  days prior to admission) admitted to [facility] on 4/12/17 with increasing left hand and foot pain.         Recent hospitalization for left foot and left hand cellulitis, abscess. Exact microbial etiology of infection not clear. Patient has been appropriately managed with I&D on 4/17/17. Cultures: strep viridans, cons s/p levofloxacin    Now admitted s/p fall, possible avulsion fracture left ulno humeral joint, low grade fevers    I agree that clinical picture is consistent with SIRS. No evidence of worsening infection of left foot on today's exam. No erythema, swelling or purulent drainage from left hand ulcer per ED report. Clinical suspicion of sepsis is low. SIRS may be due to left elbow fracture, blunt trauma to right knee/ankle. Ortho consult appreciated    Increased swelling right elbow noted yesterday. Vancomycin restarted due to concerns of cellulitis. CT scan right elbow reveals soft tissue swelling. Significant swelling right elbow, forearm - no leukocytosis. Intermittent low grade fevers. Monitor for evolving abscess     Antibiotic management complicated due to  life threatening allergy to pcn (anaphylaxis 30 years ago). Non life threatening allergy to moxifloxacin. She has tolerated levofloxacin. Recommendations:     1. Discontinue levofloxacin. Continue vancomycin for now  2. F/u ortho recommendations  3. F/u cbc, clinically     Above plan was discussed in details with patient,  and dr Gio Da Silva. Please call me if any further questions or concerns. Will continue to participate in the care of this patient. subjective:    C/o pain both elbows.     Patient denies headaches, visual disturbances, sore throat, runny nose, earaches, cp, sob, chills, cough, abdominal pain, diarrhea, burning micturition, or weakness in extremities. she denies back pain/flank pain. .        home Medication List    Details   oxyCODONE-acetaminophen (PERCOCET) 5-325 mg per tablet Take 1 Tab by mouth every eight (8) hours as needed for Pain. Max Daily Amount: 3 Tabs. Qty: 15 Tab, Refills: 0      umeclidinium-vilanterol (ANORO ELLIPTA) 62.5-25 mcg/actuation inhaler Take 1 Puff by inhalation daily. Qty: 1 Inhaler, Refills: 5      VENTOLIN HFA 90 mcg/actuation inhaler INHALE 2 PUFFS EVERY 4 HOURS AS NEEDED  Qty: 1 Inhaler, Refills: 4      divalproex DR (DEPAKOTE) 250 mg tablet Take 250 mg by mouth nightly. furosemide (LASIX) 20 mg tablet Take 1 Tab by mouth daily. Qty: 30 Tab, Refills: 0      metOLazone (ZAROXOLYN) 5 mg tablet Take 0.5 Tabs by mouth Every Mon, Wed & Sun.  Qty: 30 Tab, Refills: 6      potassium chloride (KLOR-CON) 20 mEq packet Take 1 Packet by mouth daily. Qty: 30 Each, Refills: 0      clopidogrel (PLAVIX) 75 mg tablet Take 1 Tab by mouth daily. Qty: 30 Tab, Refills: 0      insulin glargine (LANTUS) 100 unit/mL injection 60 units subcutaneously every night  Qty: 1 Vial, Refills: 0      insulin syringe,safetyneedle 1 mL 30 gauge x 5/16\" syrg As directed  Qty: 50 Each, Refills: 0      ARIPiprazole (ABILIFY) 5 mg tablet Take 10 mg by mouth daily. Comments: take 2 tabs each morning      albuterol (PROVENTIL VENTOLIN) 2.5 mg /3 mL (0.083 %) nebulizer solution USE 1 NEB EVERY 4 HOURS FOR WHEEZING AND SHORTNESS OF BREATH  Indications: CHRONIC OBSTRUCTIVE PULMONARY DISEASE  Qty: 2 Package, Refills: 3    Associated Diagnoses: Chronic obstructive pulmonary disease, unspecified COPD type (HCC)      eplerenone (INSPRA) 25 mg tablet Take  by mouth daily. gabapentin (NEURONTIN) 300 mg capsule Take 300 mg by mouth three (3) times daily. acetaminophen (TYLENOL) 325 mg tablet Take 325 mg by mouth every six (6) hours as needed for Pain. traZODone (DESYREL) 100 mg tablet Take 100 mg by mouth nightly. rosuvastatin (CRESTOR) 5 mg tablet Take 1 Tab by mouth nightly. Qty: 30 Tab, Refills: 6      l-methylfolate-vit b12-vit b6 (METANX) 2.8-2-25 mg Tab Take  by mouth. aspirin 81 mg tablet Take 81 mg by mouth daily. Nebulizer Accessories Kit Use with nebulizer machine  Qty: 1 Kit, Refills: prn    Associated Diagnoses: COPD (chronic obstructive pulmonary disease) ([facility])      insulin CONCENTRATED regular (U-500 CONCENTRATED) 500 unit/mL Soln 20 Units by SubCUTAneous route. With breakfast, lunch, and dinner      levothyroxine (SYNTHROID) 100 mcg tablet Take  by mouth Daily (before breakfast). sertraline (ZOLOFT) 100 mg tablet Take 50 mg by mouth daily. Oxygen-Air Delivery Systems by Nasal route continuous. 2 LNC      ergocalciferol (VITAMIN D) 50,000 unit capsule Take 50,000 Units by mouth daily.              Current Facility-Administered Medications   Medication Dose Route Frequency    [START ON 5/6/2017] VANCOMYCIN TROUGH DUE  1 Each Other Daily    vancomycin (VANCOCIN) 1,500 mg in 0.9% sodium chloride 500 mL IVPB  1,500 mg IntraVENous Q12H    acetaminophen (TYLENOL) tablet 650 mg  650 mg Oral Q6H PRN    levoFLOXacin (LEVAQUIN) 750 mg in D5W IVPB  750 mg IntraVENous Q24H    albuterol-ipratropium (DUO-NEB) 2.5 MG-0.5 MG/3 ML  3 mL Nebulization Q4H PRN    ARIPiprazole (ABILIFY) tablet 10 mg  10 mg Oral DAILY    aspirin delayed-release tablet 81 mg  81 mg Oral DAILY    divalproex DR (DEPAKOTE) tablet 250 mg  250 mg Oral QHS    eplerenone (INSPRA) tablet 25 mg  25 mg Oral DAILY    gabapentin (NEURONTIN) capsule 300 mg  300 mg Oral TID    insulin glargine (LANTUS) injection 60 Units  60 Units SubCUTAneous QHS    levothyroxine (SYNTHROID) tablet 100 mcg  100 mcg Oral ACB    rosuvastatin (CRESTOR) tablet 5 mg  5 mg Oral QHS    sertraline (ZOLOFT) tablet 50 mg  50 mg Oral DAILY    traZODone (DESYREL) tablet 100 mg  100 mg Oral QHS    umeclidinium-vilanterol (ANORO ELLIPTA) 62.5 mcg- 25 mcg/inhalation  1 Puff Inhalation DAILY    sodium chloride (NS) flush 5-10 mL  5-10 mL IntraVENous Q8H    sodium chloride (NS) flush 5-10 mL  5-10 mL IntraVENous PRN    morphine injection 2 mg  2 mg IntraVENous Q4H PRN    heparin (porcine) injection 5,000 Units  5,000 Units SubCUTAneous Q8H    insulin lispro (HUMALOG) injection   SubCUTAneous AC&HS       Allergies: Moxifloxacin; Pcn [penicillins]; and Sulfa (sulfonamide antibiotics)    Temp (24hrs), Av.3 °F (37.9 °C), Min:99.1 °F (37.3 °C), Max:101.3 °F (38.5 °C)    Visit Vitals    BP 96/63 (BP 1 Location: Left arm, BP Patient Position: At rest)    Pulse 94    Temp 99.5 °F (37.5 °C)    Resp 20    Ht 5' 5\" (1.651 m)    Wt 95.3 kg (210 lb)    LMP 2012    SpO2 100%    BMI 34.95 kg/m2       ROS: 12 point ROS obtained in details. Pertinent positives as mentioned in HPI,   otherwise negative    Physical Exam:    Constitutional: She is oriented to person, place, and time. She appears well-developed. No distress. Obese. Chronically ill appearing. HENT:   Head: Atraumatic. Mouth/Throat: Oropharynx is clear and moist.   Eyes: EOM are normal. Pupils are equal, round, and reactive to light. Neck: Normal range of motion. Cardiovascular: Regular rhythm and normal heart sounds. No murmur heard. Pulmonary/Chest: Effort normal. She has no rales. Bilateral air entry normal.  Abdominal: Soft. Bowel sounds are normal. There is no tenderness. There is no guarding. Musculoskeletal:   Minimal TTP over anterior right knee and right lateral ankle, distal fibula. Minimal TTP of left wrist. Visualized part of left hand is puffy including all fingers but soft and non tender. Left hand ulcer healing Small scab on left foot with gauze incorporated in it - removed. Some fresh blood noted underneath. no left foot tenderness.  Edema right elbow/right forearm with overlying tenderness  Neurological: She is alert and oriented to person, place, and time. No cranial nerve deficit. Skin: She is not diaphoretic. Psychiatric: She has a normal mood and affect. Nursing note and vitals reviewed. Labs: Results:   Chemistry Recent Labs      05/05/17   0407  05/04/17   0428   GLU  104*  105*   NA  136  135*   K  4.3  3.2*   CL  102  100   CO2  26  27   BUN  11  10   CREA  0.89  0.81   CA  8.7  8.2*   AGAP  8  8   BUCR  12  12      CBC w/Diff No results for input(s): WBC, RBC, HGB, HCT, PLT, GRANS, LYMPH, EOS, HGBEXT, HCTEXT, PLTEXT, HGBEXT, HCTEXT, PLTEXT in the last 72 hours. Microbiology No results for input(s): CULT in the last 72 hours.        RADIOLOGY:    All available imaging studies/reports in [facility] for this admission were reviewed    Dr. Arlette Max, Infectious Disease Specialist  [phone]  May 5, 2017  11:13 AM

## 2017-05-05 NOTE — ROUTINE PROCESS
Bedside shift change report given to Stew Beasley (oncoming nurse) by Drew Laughlin RN   (offgoing nurse). Report included the following information SBAR, Kardex, MAR and Recent Results.

## 2017-05-06 LAB
DATE LAST DOSE: NORMAL
ERYTHROCYTE [DISTWIDTH] IN BLOOD BY AUTOMATED COUNT: 14 % (ref 11.6–14.5)
GLUCOSE BLD STRIP.AUTO-MCNC: 105 MG/DL (ref 70–110)
GLUCOSE BLD STRIP.AUTO-MCNC: 139 MG/DL (ref 70–110)
GLUCOSE BLD STRIP.AUTO-MCNC: 188 MG/DL (ref 70–110)
GLUCOSE BLD STRIP.AUTO-MCNC: 71 MG/DL (ref 70–110)
HCT VFR BLD AUTO: 30.7 % (ref 35–45)
HGB BLD-MCNC: 10.2 G/DL (ref 12–16)
MCH RBC QN AUTO: 26.4 PG (ref 24–34)
MCHC RBC AUTO-ENTMCNC: 33.2 G/DL (ref 31–37)
MCV RBC AUTO: 79.5 FL (ref 74–97)
PLATELET # BLD AUTO: 185 K/UL (ref 135–420)
PMV BLD AUTO: 9.7 FL (ref 9.2–11.8)
RBC # BLD AUTO: 3.86 M/UL (ref 4.2–5.3)
REPORTED DOSE,DOSE: NORMAL UNITS
REPORTED DOSE/TIME,TMG: 2200
VANCOMYCIN TROUGH SERPL-MCNC: 14.9 UG/ML (ref 10–20)
WBC # BLD AUTO: 8 K/UL (ref 4.6–13.2)

## 2017-05-06 PROCEDURE — 74011250636 HC RX REV CODE- 250/636: Performed by: INTERNAL MEDICINE

## 2017-05-06 PROCEDURE — 80202 ASSAY OF VANCOMYCIN: CPT | Performed by: INTERNAL MEDICINE

## 2017-05-06 PROCEDURE — 85027 COMPLETE CBC AUTOMATED: CPT | Performed by: INTERNAL MEDICINE

## 2017-05-06 PROCEDURE — 36415 COLL VENOUS BLD VENIPUNCTURE: CPT | Performed by: INTERNAL MEDICINE

## 2017-05-06 PROCEDURE — 74011250637 HC RX REV CODE- 250/637: Performed by: INTERNAL MEDICINE

## 2017-05-06 PROCEDURE — 82962 GLUCOSE BLOOD TEST: CPT

## 2017-05-06 PROCEDURE — 65270000029 HC RM PRIVATE

## 2017-05-06 PROCEDURE — 74011636637 HC RX REV CODE- 636/637: Performed by: INTERNAL MEDICINE

## 2017-05-06 PROCEDURE — 77010033678 HC OXYGEN DAILY

## 2017-05-06 RX ORDER — FAMOTIDINE 20 MG/1
20 TABLET, FILM COATED ORAL 2 TIMES DAILY
Status: DISCONTINUED | OUTPATIENT
Start: 2017-05-06 | End: 2017-05-10 | Stop reason: HOSPADM

## 2017-05-06 RX ORDER — MORPHINE SULFATE 4 MG/ML
2 INJECTION, SOLUTION INTRAMUSCULAR; INTRAVENOUS
Status: DISCONTINUED | OUTPATIENT
Start: 2017-05-06 | End: 2017-05-08

## 2017-05-06 RX ORDER — IBUPROFEN 400 MG/1
400 TABLET ORAL 3 TIMES DAILY
Status: DISCONTINUED | OUTPATIENT
Start: 2017-05-06 | End: 2017-05-08

## 2017-05-06 RX ADMIN — Medication 2 MG: at 14:09

## 2017-05-06 RX ADMIN — Medication 2 MG: at 19:50

## 2017-05-06 RX ADMIN — IBUPROFEN 400 MG: 400 TABLET, FILM COATED ORAL at 17:35

## 2017-05-06 RX ADMIN — VANCOMYCIN HYDROCHLORIDE 1500 MG: 10 INJECTION, POWDER, LYOPHILIZED, FOR SOLUTION INTRAVENOUS at 22:24

## 2017-05-06 RX ADMIN — Medication 10 ML: at 13:38

## 2017-05-06 RX ADMIN — Medication 2 MG: at 06:08

## 2017-05-06 RX ADMIN — Medication 2 MG: at 02:10

## 2017-05-06 RX ADMIN — GABAPENTIN 300 MG: 300 CAPSULE ORAL at 08:58

## 2017-05-06 RX ADMIN — GABAPENTIN 300 MG: 300 CAPSULE ORAL at 21:52

## 2017-05-06 RX ADMIN — VANCOMYCIN HYDROCHLORIDE 1500 MG: 10 INJECTION, POWDER, LYOPHILIZED, FOR SOLUTION INTRAVENOUS at 10:13

## 2017-05-06 RX ADMIN — GABAPENTIN 300 MG: 300 CAPSULE ORAL at 17:34

## 2017-05-06 RX ADMIN — HEPARIN SODIUM 5000 UNITS: 5000 INJECTION, SOLUTION INTRAVENOUS; SUBCUTANEOUS at 10:13

## 2017-05-06 RX ADMIN — FAMOTIDINE 20 MG: 20 TABLET ORAL at 17:35

## 2017-05-06 RX ADMIN — Medication 10 ML: at 06:08

## 2017-05-06 RX ADMIN — FAMOTIDINE 20 MG: 20 TABLET ORAL at 12:12

## 2017-05-06 RX ADMIN — HEPARIN SODIUM 5000 UNITS: 5000 INJECTION, SOLUTION INTRAVENOUS; SUBCUTANEOUS at 02:10

## 2017-05-06 RX ADMIN — ASPIRIN 81 MG: 81 TABLET, COATED ORAL at 08:58

## 2017-05-06 RX ADMIN — INSULIN LISPRO 4 UNITS: 100 INJECTION, SOLUTION INTRAVENOUS; SUBCUTANEOUS at 22:01

## 2017-05-06 RX ADMIN — SERTRALINE HYDROCHLORIDE 50 MG: 50 TABLET ORAL at 08:58

## 2017-05-06 RX ADMIN — Medication 2 MG: at 23:56

## 2017-05-06 RX ADMIN — HEPARIN SODIUM 5000 UNITS: 5000 INJECTION, SOLUTION INTRAVENOUS; SUBCUTANEOUS at 18:25

## 2017-05-06 RX ADMIN — Medication 10 ML: at 22:03

## 2017-05-06 RX ADMIN — DIVALPROEX SODIUM 250 MG: 250 TABLET, DELAYED RELEASE ORAL at 21:53

## 2017-05-06 RX ADMIN — TRAZODONE HYDROCHLORIDE 100 MG: 100 TABLET ORAL at 21:52

## 2017-05-06 RX ADMIN — ARIPIPRAZOLE 10 MG: 10 TABLET ORAL at 08:59

## 2017-05-06 RX ADMIN — INSULIN GLARGINE 60 UNITS: 100 INJECTION, SOLUTION SUBCUTANEOUS at 22:02

## 2017-05-06 RX ADMIN — ROSUVASTATIN CALCIUM 5 MG: 5 TABLET ORAL at 21:52

## 2017-05-06 RX ADMIN — IBUPROFEN 400 MG: 400 TABLET, FILM COATED ORAL at 21:52

## 2017-05-06 RX ADMIN — LEVOTHYROXINE SODIUM 100 MCG: 100 TABLET ORAL at 06:32

## 2017-05-06 RX ADMIN — IBUPROFEN 400 MG: 400 TABLET, FILM COATED ORAL at 12:12

## 2017-05-06 RX ADMIN — Medication 2 MG: at 10:13

## 2017-05-06 NOTE — ROUTINE PROCESS
Bedside shift change report given to JENNY Potter (oncoming nurse) by Florence Sinclair (offgoing nurse). Report included the following information SBAR, Kardex, MAR and Recent Results.

## 2017-05-06 NOTE — ROUTINE PROCESS
Bedside shift change report given to [address] [place] [address] (oncoming nurse) by Alyce Walden RN   (offgoing nurse). Report included the following information SBAR, Kardex, MAR and Recent Results.

## 2017-05-06 NOTE — PROGRESS NOTES
[place] [facility]        [facility] resting at this time. AOx3, resting in bed  Left hand dorsal sterile dressing noted, (A) lacking full extension all fingers x 5 degrees at MCPJ's, (A) flexion 8 cm palm to pulp deficit, cap refill < 2 sec. Luca Elbows NT, no lesions, masses, nor fluctuance    ,   Visit Vitals Stable x 18 hours                        Ht 5' 5\" (1.651 m)    Wt 210 lb (95.3 kg)    SpO2 92%    BMI 34.95 kg/m2             Dx    1. Left dorsal hand wound: no abscess (wound care)  2. left dorsal foot dry eschar from prior wound: dry dressings  3. Left elbow contusion:     Again, there is focal soft tissue density at the dorsal aspect of the olecranon  process of left ulna suggestive of olecranon bursitis. There are also evidences  of soft tissue edema and cellulitis cellulitis in the subcutaneous soft tissues  of dorsal aspect of proximal and mid left forearm. Mild edema, with or without  cellulitis can be identified in the subcutaneous tissues of distal portion of  left arm and at the lateral and medial aspect of forearm.        2. At the distal aspect of cerebellum of distal left humerus and to a lesser  extent in the distal aspect of capitellum, there are subchondral sclerotic  changes, most likely to be early osteoarthritic changes. Differential diagnoses  may include focal avascular necrosis with sclerotic changes. No deformity of the  articular surface.                     4. right elbow cellulitis:   There is posterior subcutaneous inflammation around the upper elbow to the mid  forearm. Small fluid collection or abscess not completely excluded. No large  collection identified on noncontrast study. Mild edema in the triceps with no  discrete fluid collection. No soft tissue air. No radiopaque foreign bodies.     No bony erosion or fracture. No elbow joint effusion. There is irregular  sclerosis at the capitellum.  Radial head is intact.     IMPRESSION  IMPRESSION:     Posterior subcutaneous cellulitis from the upper elbow to mid forearm. No large  fluid collection identified. No joint effusion. No bony erosion.  Heterogeneous  sclerosis at the capitellum, developing osteonecrosis possible.             POC per ID, Vanco and wound care, no sx interventions necessary at this time      Alicia Hardy PA-C  5/6/2017

## 2017-05-06 NOTE — PROGRESS NOTES
[facility] Hospitalist Group  Progress Note    Patient: Naseem Davalos Age: 62 y.o. : 1955 MR#: [id number] SSN: [id number]  Date/Time: 2017     Subjective:     Her main concern is pain over limbs   No CP No SOB No cough       Assessment/Plan:   Patient with PMH of DM2, COPD , HTN , Hep C , H/o Drug abuse , recent I&D of left hand and left foot  for abscess, had fall and noted to have fever in ED . Fever + tachycardia +tachypnea. + hyperglycemia +no elevation of LA  And no leucocytosis . 1. Sepsis - SIRS   - Fever , tachycardia , Tachypnea , recent I&D of Left hand and left foot   - CT UL showing - forearm /Elbow area cellulitis right UL  - H/o Fall and blunt trauma to right side of body   - No Local abscess formation   - On Vanco   - So far cultures are negative   - Continue to have pain /Swelling and increased local temp   - Add NSAIDS for 3 days /elevation of arm /DVT study of Right UL  - Chest xray - no definite infiltrate     2 DM2   - Continue current treatments - add meal time rapid insulin   - SSI     3 COPD   - Continue BD     4 Hypothyroid   - Continue replacement     5 Left Elbow - Small possible avulsion fracture from the ulnohumeral articulation.  Donor site  is uncertain.  - Ortho consult - reviewed and appreciated     6 Hypokalemia   - replacement ordered     7 X-rays suggestive of   - Necrosis of bones - Right distal femur and proximal Tibia ,     Full CODE       Case discussed with:  [x]Patient  [x]Family  []Nursing  []Case Management  DVT Prophylaxis:  []Lovenox  [x]Hep SQ  []SCDs  []Coumadin   []On Heparin gtt    Patient Active Problem List   Diagnosis Code    Diabetes mellitus ([facility] E11.9    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 G62.9    Venous insufficiency I87.2    Morbid obesity ([facility] E66.01    Chronic back pain M54.9, G89.29    Oxygen dependent Z99.81    Hypertension I10    Pain in joint, multiple sites M25.50    Encounter for long-term (current) use of other medications Z79.899    Major depressive disorder, recurrent (HCC) F33.9    Bipolar affective disorder ([facility] F31.9    Other and unspecified hyperlipidemia E78.5    Chronic diastolic heart failure (HCC) I50.32    Chronic airway obstruction, not elsewhere classified J44.9    Essential hypertension, benign I10    Chronic kidney disease N18.9    Depression F32.9    Back injury S39. 92XA    Bilateral knee pain M25.561, M25.562    Anxiety F41.9    Wears glasses Z97.3    History of hepatitis C Z86.19    Sickle cell trait (HCC) D57.3    Acute renal failure (HCC) N17.9    Unspecified hypothyroidism E03.9    Genital herpes A60.00    Sarcoidosis (HCC) T14.5    Metabolic encephalopathy G84.67    Hyponatremia E87.1    Abscess of right arm L02.413    Abdominal pain, epigastric R10.13    Chest pain R07.9    Hypoxemia requiring supplemental oxygen R09.02, Z99.81    Hyperglycemia R73.9    Elevated troponin R74.8    CAP (community acquired pneumonia) J18.9    'Light-for-dates' infant with signs of fetal malnutrition P05.00    Pneumonia J18.9    Abnormal nuclear stress test R94.39    Cellulitis L03.90    Cellulitis and abscess of hand L03.119, L02.519    Cellulitis and abscess of foot L03.119, L02.619    SIRS (systemic inflammatory response syndrome) ([facility]) R65.10    Cellulitis of right elbow L03. 113    Olecranon bursitis of right elbow M70.21    Contusion of elbow S50.00XA       Objective:   VS:   Visit Vitals    /65 (BP 1 Location: Left arm, BP Patient Position: At rest)    Pulse 86    Temp 100.4 °F (38 °C)    Resp 18    Ht 5' 5\" (1.651 m)    Wt 95.3 kg (210 lb)    LMP 2012    SpO2 95%    BMI 34.95 kg/m2      Tmax/24hrs: Temp (24hrs), Av.8 °F (37.1 °C), Min:97 °F (36.1 °C), Max:100.4 °F (38 °C)  IOBRIEF    Intake/Output Summary (Last 24 hours) at 17 1011  Last data filed at 17 0352   Gross per 24 hour   Intake              560 ml   Output 2000 ml   Net            -1440 ml       General:  Alert, cooperative, no acute distress   HEENT:  NC, Atraumatic. PERRLA, anicteric sclerae. Pulmonary:  CTA Bilaterally. No Wheezing/Rhonchi/Rales. Cardiovascular: Regular rate and Rhythm. GI:  Soft, Non distended, Non tender. + Bowel sounds. Extremities: left arm bandaged    Psych:  Not anxious or agitated. Neurologic: Grossly - Motor and Sensory functions are intact .  No Anxiety , calm , no Agitation         Medications:   Current Facility-Administered Medications   Medication Dose Route Frequency    ibuprofen (MOTRIN) tablet 400 mg  400 mg Oral TID    famotidine (PEPCID) tablet 20 mg  20 mg Oral BID    vancomycin (VANCOCIN) 1,500 mg in 0.9% sodium chloride 500 mL IVPB  1,500 mg IntraVENous Q12H    acetaminophen (TYLENOL) tablet 650 mg  650 mg Oral Q6H PRN    albuterol-ipratropium (DUO-NEB) 2.5 MG-0.5 MG/3 ML  3 mL Nebulization Q4H PRN    ARIPiprazole (ABILIFY) tablet 10 mg  10 mg Oral DAILY    aspirin delayed-release tablet 81 mg  81 mg Oral DAILY    divalproex DR (DEPAKOTE) tablet 250 mg  250 mg Oral QHS    eplerenone (INSPRA) tablet 25 mg  25 mg Oral DAILY    gabapentin (NEURONTIN) capsule 300 mg  300 mg Oral TID    insulin glargine (LANTUS) injection 60 Units  60 Units SubCUTAneous QHS    levothyroxine (SYNTHROID) tablet 100 mcg  100 mcg Oral ACB    rosuvastatin (CRESTOR) tablet 5 mg  5 mg Oral QHS    sertraline (ZOLOFT) tablet 50 mg  50 mg Oral DAILY    traZODone (DESYREL) tablet 100 mg  100 mg Oral QHS    umeclidinium-vilanterol (ANORO ELLIPTA) 62.5 mcg- 25 mcg/inhalation  1 Puff Inhalation DAILY    sodium chloride (NS) flush 5-10 mL  5-10 mL IntraVENous Q8H    sodium chloride (NS) flush 5-10 mL  5-10 mL IntraVENous PRN    morphine injection 2 mg  2 mg IntraVENous Q4H PRN    heparin (porcine) injection 5,000 Units  5,000 Units SubCUTAneous Q8H    insulin lispro (HUMALOG) injection   SubCUTAneous AC&HS       Labs:    Recent Results (from the past 24 hour(s))   GLUCOSE, POC    Collection Time: 05/05/17 11:50 AM   Result Value Ref Range    Glucose (POC) 162 (H) 70 - 110 mg/dL   GLUCOSE, POC    Collection Time: 05/05/17  4:14 PM   Result Value Ref Range    Glucose (POC) 187 (H) 70 - 110 mg/dL   GLUCOSE, POC    Collection Time: 05/05/17  9:33 PM   Result Value Ref Range    Glucose (POC) 163 (H) 70 - 110 mg/dL   CBC W/O DIFF    Collection Time: 05/06/17  4:33 AM   Result Value Ref Range    WBC 8.0 4.6 - 13.2 K/uL    RBC 3.86 (L) 4.20 - 5.30 M/uL    HGB 10.2 (L) 12.0 - 16.0 g/dL    HCT 30.7 (L) 35.0 - 45.0 %    MCV 79.5 74.0 - 97.0 FL    MCH 26.4 24.0 - 34.0 PG    MCHC 33.2 31.0 - 37.0 g/dL    RDW 14.0 11.6 - 14.5 %    PLATELET 909 908 - 462 K/uL    MPV 9.7 9.2 - 11.8 FL   GLUCOSE, POC    Collection Time: 05/06/17  7:16 AM   Result Value Ref Range    Glucose (POC) 71 70 - 110 mg/dL       Signed By: Bryant Meade MD     May 6, 2017

## 2017-05-07 LAB
BACTERIA SPEC CULT: NORMAL
BACTERIA SPEC CULT: NORMAL
GLUCOSE BLD STRIP.AUTO-MCNC: 116 MG/DL (ref 70–110)
GLUCOSE BLD STRIP.AUTO-MCNC: 175 MG/DL (ref 70–110)
GLUCOSE BLD STRIP.AUTO-MCNC: 183 MG/DL (ref 70–110)
GLUCOSE BLD STRIP.AUTO-MCNC: 232 MG/DL (ref 70–110)
SERVICE CMNT-IMP: NORMAL
SERVICE CMNT-IMP: NORMAL

## 2017-05-07 PROCEDURE — 74011250637 HC RX REV CODE- 250/637: Performed by: INTERNAL MEDICINE

## 2017-05-07 PROCEDURE — 74011250636 HC RX REV CODE- 250/636: Performed by: INTERNAL MEDICINE

## 2017-05-07 PROCEDURE — 74011636637 HC RX REV CODE- 636/637: Performed by: INTERNAL MEDICINE

## 2017-05-07 PROCEDURE — 65270000029 HC RM PRIVATE

## 2017-05-07 PROCEDURE — 82962 GLUCOSE BLOOD TEST: CPT

## 2017-05-07 RX ADMIN — SERTRALINE HYDROCHLORIDE 50 MG: 50 TABLET ORAL at 08:23

## 2017-05-07 RX ADMIN — HEPARIN SODIUM 5000 UNITS: 5000 INJECTION, SOLUTION INTRAVENOUS; SUBCUTANEOUS at 04:29

## 2017-05-07 RX ADMIN — IBUPROFEN 400 MG: 400 TABLET, FILM COATED ORAL at 22:11

## 2017-05-07 RX ADMIN — INSULIN GLARGINE 60 UNITS: 100 INJECTION, SOLUTION SUBCUTANEOUS at 22:12

## 2017-05-07 RX ADMIN — INSULIN LISPRO 4 UNITS: 100 INJECTION, SOLUTION INTRAVENOUS; SUBCUTANEOUS at 17:06

## 2017-05-07 RX ADMIN — Medication 2 MG: at 10:05

## 2017-05-07 RX ADMIN — GABAPENTIN 300 MG: 300 CAPSULE ORAL at 22:11

## 2017-05-07 RX ADMIN — INSULIN LISPRO 7 UNITS: 100 INJECTION, SOLUTION INTRAVENOUS; SUBCUTANEOUS at 12:39

## 2017-05-07 RX ADMIN — DIVALPROEX SODIUM 250 MG: 250 TABLET, DELAYED RELEASE ORAL at 22:11

## 2017-05-07 RX ADMIN — LEVOTHYROXINE SODIUM 100 MCG: 100 TABLET ORAL at 08:23

## 2017-05-07 RX ADMIN — IBUPROFEN 400 MG: 400 TABLET, FILM COATED ORAL at 08:23

## 2017-05-07 RX ADMIN — Medication 10 ML: at 23:35

## 2017-05-07 RX ADMIN — GABAPENTIN 300 MG: 300 CAPSULE ORAL at 08:23

## 2017-05-07 RX ADMIN — GABAPENTIN 300 MG: 300 CAPSULE ORAL at 17:15

## 2017-05-07 RX ADMIN — VANCOMYCIN HYDROCHLORIDE 1500 MG: 10 INJECTION, POWDER, LYOPHILIZED, FOR SOLUTION INTRAVENOUS at 22:22

## 2017-05-07 RX ADMIN — IBUPROFEN 400 MG: 400 TABLET, FILM COATED ORAL at 17:15

## 2017-05-07 RX ADMIN — Medication 10 ML: at 13:41

## 2017-05-07 RX ADMIN — HEPARIN SODIUM 5000 UNITS: 5000 INJECTION, SOLUTION INTRAVENOUS; SUBCUTANEOUS at 18:50

## 2017-05-07 RX ADMIN — FAMOTIDINE 20 MG: 20 TABLET ORAL at 08:23

## 2017-05-07 RX ADMIN — FAMOTIDINE 20 MG: 20 TABLET ORAL at 17:15

## 2017-05-07 RX ADMIN — ASPIRIN 81 MG: 81 TABLET, COATED ORAL at 08:23

## 2017-05-07 RX ADMIN — ARIPIPRAZOLE 10 MG: 10 TABLET ORAL at 08:23

## 2017-05-07 RX ADMIN — ROSUVASTATIN CALCIUM 5 MG: 5 TABLET ORAL at 22:11

## 2017-05-07 RX ADMIN — Medication 2 MG: at 05:23

## 2017-05-07 RX ADMIN — Medication 2 MG: at 22:11

## 2017-05-07 RX ADMIN — VANCOMYCIN HYDROCHLORIDE 1500 MG: 10 INJECTION, POWDER, LYOPHILIZED, FOR SOLUTION INTRAVENOUS at 09:12

## 2017-05-07 RX ADMIN — TRAZODONE HYDROCHLORIDE 100 MG: 100 TABLET ORAL at 22:11

## 2017-05-07 RX ADMIN — Medication 10 ML: at 06:15

## 2017-05-07 RX ADMIN — HEPARIN SODIUM 5000 UNITS: 5000 INJECTION, SOLUTION INTRAVENOUS; SUBCUTANEOUS at 12:22

## 2017-05-07 RX ADMIN — EPLERENONE 25 MG: 25 TABLET, FILM COATED ORAL at 08:23

## 2017-05-07 RX ADMIN — Medication 2 MG: at 18:20

## 2017-05-07 RX ADMIN — Medication 2 MG: at 14:02

## 2017-05-07 RX ADMIN — INSULIN LISPRO 4 UNITS: 100 INJECTION, SOLUTION INTRAVENOUS; SUBCUTANEOUS at 22:11

## 2017-05-07 NOTE — PROGRESS NOTES
[facility] Hospitalist Group  Progress Note    Patient: Doron Ann Age: 62 y.o. : 1955 MR#: [id number] SSN: [id number]  Date/Time: 2017     Subjective:     Her main concern is pain over limbs - to day with elevation of limb swelling and pain improved   No CP No SOB No cough       Assessment/Plan:   Patient with PMH of DM2, COPD , HTN , Hep C , H/o Drug abuse , recent I&D of left hand and left foot  for abscess, had fall and noted to have fever in ED . Fever + tachycardia +tachypnea. + hyperglycemia +no elevation of LA  And no leucocytosis . 1. Sepsis - SIRS - likely cellulitis - right forearm - elbow are . ( in past admitted with IVDA related abscess on left hand - denies use of such injections this time - no local abscess )   - Fever , tachycardia , Tachypnea , recent I&D of Left hand and left foot   - CT UL showing - forearm /Elbow area cellulitis right UL  - H/o Fall and blunt trauma to right side of body   - No Local abscess formation   - On Vanco   - So far cultures are negative   - Continue to have pain /Swelling and increased local temp   - Add NSAIDS for 3 days /elevation of arm /DVT study of Right UL  - Chest xray - no definite infiltrate     2 DM2   - Continue current treatments - add meal time rapid insulin   - SSI     3 COPD   - Continue BD - some improvement     4 Hypothyroid   - Continue replacement     5 Left Elbow - Small possible avulsion fracture from the ulnohumeral articulation.  Donor site  is uncertain.  - Ortho consult - reviewed and appreciated     6 Hypokalemia   - replacement ordered     7 X-rays suggestive of   - Necrosis of bones - Right distal femur and proximal Tibia ,     Full CODE       Case discussed with:  [x]Patient  [x]Family  []Nursing  []Case Management  DVT Prophylaxis:  []Lovenox  [x]Hep SQ  []SCDs  []Coumadin   []On Heparin gtt    Patient Active Problem List   Diagnosis Code    Diabetes mellitus ([facility] E11.9    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 G62.9    Venous insufficiency I87.2    Morbid obesity ([facility]) E66.01    Chronic back pain M54.9, G89.29    Oxygen dependent Z99.81    Hypertension I10    Pain in joint, multiple sites M25.50    Encounter for long-term (current) use of other medications Z79.899    Major depressive disorder, recurrent ([facility]) F33.9    Bipolar affective disorder ([facility] F31.9    Other and unspecified hyperlipidemia E78.5    Chronic diastolic heart failure ([facility]) I50.32    Chronic airway obstruction, not elsewhere classified J44.9    Essential hypertension, benign I10    Chronic kidney disease N18.9    Depression F32.9    Back injury S39. 92XA    Bilateral knee pain M25.561, M25.562    Anxiety F41.9    Wears glasses Z97.3    History of hepatitis C Z86.19    Sickle cell trait ([facility]) D57.3    Acute renal failure ([facility]) N17.9    Unspecified hypothyroidism E03.9    Genital herpes A60.00    Sarcoidosis ([facility]) M38.9    Metabolic encephalopathy U55.03    Hyponatremia E87.1    Abscess of right arm L02.413    Abdominal pain, epigastric R10.13    Chest pain R07.9    Hypoxemia requiring supplemental oxygen R09.02, Z99.81    Hyperglycemia R73.9    Elevated troponin R74.8    CAP (community acquired pneumonia) J18.9    'Light-for-dates' infant with signs of fetal malnutrition P05.00    Pneumonia J18.9    Abnormal nuclear stress test R94.39    Cellulitis L03.90    Cellulitis and abscess of hand L03.119, L02.519    Cellulitis and abscess of foot L03.119, L02.619    SIRS (systemic inflammatory response syndrome) ([facility]) R65.10    Cellulitis of right elbow L03. 113    Olecranon bursitis of right elbow M70.21    Contusion of elbow S50.00XA       Objective:   VS:   Visit Vitals    /74 (BP 1 Location: Left arm, BP Patient Position: At rest)    Pulse 63    Temp 97.1 °F (36.2 °C)    Resp 16    Ht 5' 5\" (1.651 m)    Wt 95.3 kg (210 lb)    LMP 11/25/2012    SpO2 100%    BMI 34.95 kg/m2      Tmax/24hrs: Temp (24hrs), Av.7 °F (36.5 °C), Min:97.1 °F (36.2 °C), Max:98.5 °F (36.9 °C)  IOBRIEF    Intake/Output Summary (Last 24 hours) at 17 1337  Last data filed at 17 0912   Gross per 24 hour   Intake             1000 ml   Output              600 ml   Net              400 ml       General:  Alert, cooperative, no acute distress   HEENT:  NC, Atraumatic. PERRLA, anicteric sclerae. Pulmonary:  CTA Bilaterally. No Wheezing/Rhonchi/Rales. Cardiovascular: Regular rate and Rhythm. GI:  Soft, Non distended, Non tender. + Bowel sounds. Extremities: left arm bandaged    Psych:  Not anxious or agitated. Neurologic: Grossly - Motor and Sensory functions are intact .  No Anxiety , calm , no Agitation         Medications:   Current Facility-Administered Medications   Medication Dose Route Frequency    ibuprofen (MOTRIN) tablet 400 mg  400 mg Oral TID    famotidine (PEPCID) tablet 20 mg  20 mg Oral BID    morphine injection 2 mg  2 mg IntraVENous Q4H PRN    vancomycin (VANCOCIN) 1,500 mg in 0.9% sodium chloride 500 mL IVPB  1,500 mg IntraVENous Q12H    acetaminophen (TYLENOL) tablet 650 mg  650 mg Oral Q6H PRN    albuterol-ipratropium (DUO-NEB) 2.5 MG-0.5 MG/3 ML  3 mL Nebulization Q4H PRN    ARIPiprazole (ABILIFY) tablet 10 mg  10 mg Oral DAILY    aspirin delayed-release tablet 81 mg  81 mg Oral DAILY    divalproex DR (DEPAKOTE) tablet 250 mg  250 mg Oral QHS    eplerenone (INSPRA) tablet 25 mg  25 mg Oral DAILY    gabapentin (NEURONTIN) capsule 300 mg  300 mg Oral TID    insulin glargine (LANTUS) injection 60 Units  60 Units SubCUTAneous QHS    levothyroxine (SYNTHROID) tablet 100 mcg  100 mcg Oral ACB    rosuvastatin (CRESTOR) tablet 5 mg  5 mg Oral QHS    sertraline (ZOLOFT) tablet 50 mg  50 mg Oral DAILY    traZODone (DESYREL) tablet 100 mg  100 mg Oral QHS    umeclidinium-vilanterol (ANORO ELLIPTA) 62.5 mcg- 25 mcg/inhalation  1 Puff Inhalation DAILY    sodium chloride (NS) flush 5-10 mL  5-10 mL IntraVENous Q8H    sodium chloride (NS) flush 5-10 mL  5-10 mL IntraVENous PRN    heparin (porcine) injection 5,000 Units  5,000 Units SubCUTAneous Q8H    insulin lispro (HUMALOG) injection   SubCUTAneous AC&HS       Labs:    Recent Results (from the past 24 hour(s))   GLUCOSE, POC    Collection Time: 05/06/17  4:28 PM   Result Value Ref Range    Glucose (POC) 139 (H) 70 - 110 mg/dL   GLUCOSE, POC    Collection Time: 05/06/17  9:49 PM   Result Value Ref Range    Glucose (POC) 188 (H) 70 - 110 mg/dL   GLUCOSE, POC    Collection Time: 05/07/17  7:37 AM   Result Value Ref Range    Glucose (POC) 116 (H) 70 - 110 mg/dL   GLUCOSE, POC    Collection Time: 05/07/17 11:41 AM   Result Value Ref Range    Glucose (POC) 232 (H) 70 - 110 mg/dL       Signed By: Olivia Dumont MD     May 7, 2017

## 2017-05-07 NOTE — PROGRESS NOTES
[facility]        [facility] resting at this time. AOx3, resting in bed  Left hand dorsal sterile dressing noted, (A) lacking full extension all fingers x 5 degrees at MCPJ's, (A) flexion 8 cm palm to pulp deficit, cap refill < 2 sec. Luca Elbows NT, no lesions, masses, nor fluctuance  Right elbow (P) ROM 80 degrees minus 20 degrees with guarding   Left elbow (P) ROM 70 degrees minus 30 degrees  ,   Visit Vitals Stable x 18 hours                        Ht 5' 5\" (1.651 m)    Wt 210 lb (95.3 kg)    SpO2 92%    BMI 34.95 kg/m2             Dx    1. Left dorsal hand wound: no abscess (wound care)  2. left dorsal foot dry eschar from prior wound: dry dressings  3. Left elbow contusion:     Again, there is focal soft tissue density at the dorsal aspect of the olecranon  process of left ulna suggestive of olecranon bursitis. There are also evidences  of soft tissue edema and cellulitis cellulitis in the subcutaneous soft tissues  of dorsal aspect of proximal and mid left forearm. Mild edema, with or without  cellulitis can be identified in the subcutaneous tissues of distal portion of  left arm and at the lateral and medial aspect of forearm.        2. At the distal aspect of cerebellum of distal left humerus and to a lesser  extent in the distal aspect of capitellum, there are subchondral sclerotic  changes, most likely to be early osteoarthritic changes. Differential diagnoses  may include focal avascular necrosis with sclerotic changes. No deformity of the  articular surface.                     4. right elbow cellulitis:   There is posterior subcutaneous inflammation around the upper elbow to the mid  forearm. Small fluid collection or abscess not completely excluded. No large  collection identified on noncontrast study. Mild edema in the triceps with no  discrete fluid collection. No soft tissue air. No radiopaque foreign bodies.     No bony erosion or fracture. No elbow joint effusion.  There is irregular  sclerosis at the capitellum. Radial head is intact.     IMPRESSION  IMPRESSION:     Posterior subcutaneous cellulitis from the upper elbow to mid forearm. No large  fluid collection identified. No joint effusion. No bony erosion.  Heterogeneous  sclerosis at the capitellum, developing osteonecrosis possible.             POC per ID, Vanco and wound care, no sx interventions necessary at this time      Svetlana Ambriz PA-C  5/7/2017

## 2017-05-07 NOTE — ROUTINE PROCESS
Bedside and Verbal shift change report given to [address] (oncoming nurse) by Rosa M Ramos RN (offgoing nurse). Report included the following information SBAR, Kardex, MAR and Recent Results. SITUATION:    Code Status: Full Code   Reason for Admission: SIRS (systemic inflammatory response syndrome) ([facility]    [facility] day: 5   Problem List:       Hospital Problems  Date Reviewed: 4/17/2017          Codes Class Noted POA    Cellulitis of right elbow ICD-10-CM: R50.340  ICD-9-CM: 682.3  5/4/2017 Yes        Olecranon bursitis of right elbow ICD-10-CM: M70.21  ICD-9-CM: 726.33  5/4/2017 Yes        Contusion of elbow ICD-10-CM: S50.00XA  ICD-9-CM: 923.11  5/4/2017 Yes        SIRS (systemic inflammatory response syndrome) ([facility] ICD-10-CM: R65.10  ICD-9-CM: 995.90  5/2/2017 Unknown              BACKGROUND:    Past Medical History:   Past Medical History:   Diagnosis Date    Anxiety     Asthma     Back injury 1986    jumped out of second story window    Behavioral change     Bilateral knee pain     Bipolar disorder ([facility]     Chronic airway obstruction, not elsewhere classified     SOB, on paula O2 Recent admission with psychosis    Chronic back pain     Chronic diastolic heart failure (HCC)     Stable on diuretics    Chronic kidney disease     stage III    Congestive heart failure (HCC)     COPD (chronic obstructive pulmonary disease) ([facility] 10/31/2012    Cramps, muscle, general     Depression     Diabetes mellitus     Difficulty speaking     Difficulty walking     Difficulty writing     DJD (degenerative joint disease) of knee     bilateral, mild    Edema     The edema involves both lower extremities. The episodes last for 1 week. The patient describes this as worsening. Symptoms are exacerbated by prolonged sitting. Symptoms are relieved by leg elevation and diuretics. NO CHANGE    Essential hypertension, benign     Better controlled    Falls frequently     Fatigue     Generalized stiffness  Headache     Heartburn     Hiatal hernia     History of hepatitis C     treated    Hoarseness of voice     Hypothyroidism     Lung disease     Memory difficulty     Morbid obesity (HCC)     Multiple joint pain     Nausea     Numbness and tingling     arms and legs, related to diabetic neuropathy    Osteoarthritis of left knee     Other and unspecified hyperlipidemia     LDL reportedly very high/started on crestor recently by diabetic doctor    Oxygen dependent     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     Sarcoidosis ([facility]     Sickle cell trait ([facility]     Sinusitis     SOB (shortness of breath)     STD (female)     herpes    Swelling of body region     legs and feet    Tinnitus     Venous insufficiency     Vertigo     Weakness generalized     Wears glasses     Weight gain          Patient taking anticoagulants yes     ASSESSMENT:    Changes in Assessment Throughout Shift: none     Patient has Central Line: no Reasons if yes: none   Patient has Renae Cath: no Reasons if yes: n/a      Last Vitals:     Vitals:    05/06/17 1127 05/06/17 1603 05/1955 05/06/17 2349   BP: 100/67 107/70 120/77 128/76   Pulse: 84 65 77 76   Resp: 18 18 18 18   Temp: 99.8 °F (37.7 °C) 97.3 °F (36.3 °C) 98.1 °F (36.7 °C) 98.5 °F (36.9 °C)   SpO2: 93% 97% 96% 97%   Weight:       Height:       LMP: 11/25/2012        IV and DRAINS (will only show if present)   [REMOVED] Peripheral IV 05/02/17 Right Wrist-Site Assessment: Clean, dry, & intact  Peripheral IV 05/05/17 Right Forearm-Site Assessment: Clean, dry, & intact  [REMOVED] Peripheral IV 05/01/17 Right Forearm-Site Assessment: Clean, dry, & intact  [REMOVED] Peripheral IV 05/01/17 Right Hand-Site Assessment: Clean, dry, & intact     WOUND (if present)   Wound Type: Incision and drainage   Dressing present Dressing Present : No   Wound Concerns/Notes:  Wound mgt.      PAIN    Pain Assessment    Pain Intensity 1: 2 (05/07/17 0257)    Pain Location 1: Arm    Pain Intervention(s) 1: Medication (see MAR)    Patient Stated Pain Goal: 0  o Interventions for Pain:  Pain mgt.  o Intervention effective: yes  o Time of last intervention: 0600   o Reassessment Completed: yes      Last 3 Weights:  Last 3 Recorded Weights in this Encounter    05/01/17 2213 05/02/17 2241   Weight: 95.3 kg (210 lb) 95.3 kg (210 lb)     Weight change:      INTAKE/OUPUT    Current Shift: 05/06 1901 - 05/07 0700  In: 500 [P.O.:500]  Out: 600 [Urine:600]    Last three shifts: 05/05 0701 - 05/06 1900  In: 1300 [P.O.:800; I.V.:500]  Out: 2000 [Urine:2000]     LAB RESULTS     Recent Labs      05/06/17   0433   WBC  8.0   HGB  10.2*   HCT  30.7*   PLT  185        Recent Labs      05/05/17   0407   NA  136   K  4.3   GLU  104*   BUN  11   CREA  0.89   CA  8.7       RECOMMENDATIONS AND DISCHARGE PLANNING     1. Pending tests/procedures/ Plan of Care or Other Needs: follow up labs     2. Discharge plan for patient and Needs/Barriers: PT OT    3. Estimated Discharge Date: 5/10/17 Posted on Whiteboard in hospitals: yes      4. The patient's care plan was reviewed with the oncoming nurse. \"HEALS\" SAFETY CHECK      Fall Risk    Total Score: 3    Safety Measures: Safety Measures: Bed/Chair-Wheels locked, Bed in low position, Call light within reach    A safety check occurred in the patient's room between off going nurse and oncoming nurse listed above.     The safety check included the below items  Area Items   H  High Alert Medications - Verify all high alert medication drips (heparin, PCA, etc.)   E  Equipment - Suction is set up for ALL patients (with yanker)  - Red plugs utilized for all equipment (IV pumps, etc.)  - WOWs wiped down at end of shift.  - Room stocked with oxygen, suction, and other unit-specific supplies   A  Alarms - Bed alarm is set for fall risk patients  - Ensure chair alarm is in place and activated if patient is up in a chair   L  Lines - Check IV for any infiltration  - Renae bag is empty if patient has a Renae   - Tubing and IV bags are labeled   S  Safety   - Room is clean, patient is clean, and equipment is clean. - Hallways are clear from equipment besides carts. - Fall bracelet on for fall risk patients  - Ensure room is clear and free of clutter  - Suction is set up for ALL patients (with chantellker)  - Hallways are clear from equipment besides carts.    - Isolation precautions followed, supplies available outside room, sign posted     Jeanella Epley, RN

## 2017-05-07 NOTE — ROUTINE PROCESS
Bedside and Verbal shift change report given to Zohra Garcia 83 (oncoming nurse) by Mihir Ledesma, JENNY (offgoing nurse). Report included the following information SBAR, Kardex, MAR and Recent Results. SITUATION:    Code Status: Full Code   Reason for Admission: SIRS (systemic inflammatory response syndrome) ([facility]    [facility] day: 5   Problem List:       Hospital Problems  Date Reviewed: 4/17/2017          Codes Class Noted POA    Cellulitis of right elbow ICD-10-CM: O49.101  ICD-9-CM: 682.3  5/4/2017 Yes        Olecranon bursitis of right elbow ICD-10-CM: M70.21  ICD-9-CM: 726.33  5/4/2017 Yes        Contusion of elbow ICD-10-CM: S50.00XA  ICD-9-CM: 923.11  5/4/2017 Yes        SIRS (systemic inflammatory response syndrome) ([facility] ICD-10-CM: R65.10  ICD-9-CM: 995.90  5/2/2017 Unknown              BACKGROUND:    Past Medical History:   Past Medical History:   Diagnosis Date    Anxiety     Asthma     Back injury 1986    jumped out of second story window    Behavioral change     Bilateral knee pain     Bipolar disorder ([facility]     Chronic airway obstruction, not elsewhere classified     SOB, on paula O2 Recent admission with psychosis    Chronic back pain     Chronic diastolic heart failure (HCC)     Stable on diuretics    Chronic kidney disease     stage III    Congestive heart failure (HCC)     COPD (chronic obstructive pulmonary disease) ([facility] 10/31/2012    Cramps, muscle, general     Depression     Diabetes mellitus     Difficulty speaking     Difficulty walking     Difficulty writing     DJD (degenerative joint disease) of knee     bilateral, mild    Edema     The edema involves both lower extremities. The episodes last for 1 week. The patient describes this as worsening. Symptoms are exacerbated by prolonged sitting. Symptoms are relieved by leg elevation and diuretics. NO CHANGE    Essential hypertension, benign     Better controlled    Falls frequently     Fatigue     Generalized stiffness  Headache     Heartburn     Hiatal hernia     History of hepatitis C     treated    Hoarseness of voice     Hypothyroidism     Lung disease     Memory difficulty     Morbid obesity (HCC)     Multiple joint pain     Nausea     Numbness and tingling     arms and legs, related to diabetic neuropathy    Osteoarthritis of left knee     Other and unspecified hyperlipidemia     LDL reportedly very high/started on crestor recently by diabetic doctor    Oxygen dependent     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     Sarcoidosis ([facility]     Sickle cell trait ([facility]     Sinusitis     SOB (shortness of breath)     STD (female)     herpes    Swelling of body region     legs and feet    Tinnitus     Venous insufficiency     Vertigo     Weakness generalized     Wears glasses     Weight gain          Patient taking anticoagulants yes     ASSESSMENT:    Changes in Assessment Throughout Shift: none     Patient has Central Line: no Reasons if yes: none   Patient has Renae Cath: no Reasons if yes: n/a      Last Vitals:     Vitals:    05/07/17 0528 05/07/17 0828 05/07/17 1222 05/07/17 1627   BP: 116/66 120/78 112/74 113/75   Pulse: 70 67 63 72   Resp: 18 22 16 18   Temp: 97.8 °F (36.6 °C) 97.2 °F (36.2 °C) 97.1 °F (36.2 °C) 97.3 °F (36.3 °C)   SpO2: 99% 99% 100% 96%   Weight:       Height:       LMP: 11/25/2012        IV and DRAINS (will only show if present)   [REMOVED] Peripheral IV 05/02/17 Right Wrist-Site Assessment: Clean, dry, & intact  Peripheral IV 05/05/17 Right Forearm-Site Assessment: Clean, dry, & intact  [REMOVED] Peripheral IV 05/01/17 Right Forearm-Site Assessment: Clean, dry, & intact  [REMOVED] Peripheral IV 05/01/17 Right Hand-Site Assessment: Clean, dry, & intact     WOUND (if present)   Wound Type: Incision and drainage   Dressing present Dressing Present : No   Wound Concerns/Notes:  Wound mgt.      PAIN    Pain Assessment    Pain Intensity 1: 0 (05/07/17 0789)    Pain Location 1: Arm    Pain Intervention(s) 1: Medication (see MAR)    Patient Stated Pain Goal: 0  o Interventions for Pain:  Pain mgt.  o Intervention effective: yes  o Time of last intervention: 0600   o Reassessment Completed: yes      Last 3 Weights:  Last 3 Recorded Weights in this Encounter    05/01/17 2213 05/02/17 2241   Weight: 95.3 kg (210 lb) 95.3 kg (210 lb)     Weight change:      INTAKE/OUPUT    Current Shift:      Last three shifts: 05/06 0701 - 05/07 1900  In: 1000 [P.O.:500; I.V.:500]  Out: 600 [Urine:600]     LAB RESULTS     Recent Labs      05/06/17   0433   WBC  8.0   HGB  10.2*   HCT  30.7*   PLT  185        Recent Labs      05/05/17   0407   NA  136   K  4.3   GLU  104*   BUN  11   CREA  0.89   CA  8.7       RECOMMENDATIONS AND DISCHARGE PLANNING     1. Pending tests/procedures/ Plan of Care or Other Needs: follow up labs     2. Discharge plan for patient and Needs/Barriers: PT OT    3. Estimated Discharge Date: 5/10/17 Posted on Whiteboard in \[facility]\"": yes      4. The patient's care plan was reviewed with the oncoming nurse. \"HEALS\" SAFETY CHECK      Fall Risk    Total Score: 3    Safety Measures: Safety Measures: Bed/Chair-Wheels locked, Bed in low position, Bed/Chair alarm on, Call light within reach    A safety check occurred in the patient's room between off going nurse and oncoming nurse listed above.     The safety check included the below items  Area Items   H  High Alert Medications - Verify all high alert medication drips (heparin, PCA, etc.)   E  Equipment - Suction is set up for ALL patients (with yanker)  - Red plugs utilized for all equipment (IV pumps, etc.)  - WOWs wiped down at end of shift.  - Room stocked with oxygen, suction, and other unit-specific supplies   A  Alarms - Bed alarm is set for fall risk patients  - Ensure chair alarm is in place and activated if patient is up in a chair   L  Lines - Check IV for any infiltration  - Renae bag is empty if patient has a Renae   - Tubing and IV bags are labeled   S  Safety   - Room is clean, patient is clean, and equipment is clean. - Hallways are clear from equipment besides carts. - Fall bracelet on for fall risk patients  - Ensure room is clear and free of clutter  - Suction is set up for ALL patients (with yanker)  - Hallways are clear from equipment besides carts.    - Isolation precautions followed, supplies available outside room, sign posted     Brody Mendez RN

## 2017-05-08 ENCOUNTER — APPOINTMENT (OUTPATIENT)
Dept: GENERAL RADIOLOGY | Age: 62
DRG: 383 | End: 2017-05-08
Attending: SPECIALIST
Payer: MEDICAID

## 2017-05-08 LAB
ERYTHROCYTE [SEDIMENTATION RATE] IN BLOOD: 117 MM/HR (ref 0–30)
GLUCOSE BLD STRIP.AUTO-MCNC: 135 MG/DL (ref 70–110)
GLUCOSE BLD STRIP.AUTO-MCNC: 142 MG/DL (ref 70–110)
GLUCOSE BLD STRIP.AUTO-MCNC: 186 MG/DL (ref 70–110)
GLUCOSE BLD STRIP.AUTO-MCNC: 213 MG/DL (ref 70–110)
RHEUMATOID FACT SER QL LA: NEGATIVE

## 2017-05-08 PROCEDURE — 86200 CCP ANTIBODY: CPT | Performed by: SPECIALIST

## 2017-05-08 PROCEDURE — 97116 GAIT TRAINING THERAPY: CPT

## 2017-05-08 PROCEDURE — 74011250636 HC RX REV CODE- 250/636: Performed by: INTERNAL MEDICINE

## 2017-05-08 PROCEDURE — 36415 COLL VENOUS BLD VENIPUNCTURE: CPT | Performed by: INTERNAL MEDICINE

## 2017-05-08 PROCEDURE — 86140 C-REACTIVE PROTEIN: CPT | Performed by: SPECIALIST

## 2017-05-08 PROCEDURE — 65270000029 HC RM PRIVATE

## 2017-05-08 PROCEDURE — 86038 ANTINUCLEAR ANTIBODIES: CPT | Performed by: INTERNAL MEDICINE

## 2017-05-08 PROCEDURE — 82962 GLUCOSE BLOOD TEST: CPT

## 2017-05-08 PROCEDURE — 86235 NUCLEAR ANTIGEN ANTIBODY: CPT | Performed by: SPECIALIST

## 2017-05-08 PROCEDURE — 74011250636 HC RX REV CODE- 250/636: Performed by: SPECIALIST

## 2017-05-08 PROCEDURE — 77010033678 HC OXYGEN DAILY

## 2017-05-08 PROCEDURE — 93971 EXTREMITY STUDY: CPT

## 2017-05-08 PROCEDURE — 97530 THERAPEUTIC ACTIVITIES: CPT

## 2017-05-08 PROCEDURE — 74011250637 HC RX REV CODE- 250/637: Performed by: INTERNAL MEDICINE

## 2017-05-08 PROCEDURE — 74011636637 HC RX REV CODE- 636/637: Performed by: INTERNAL MEDICINE

## 2017-05-08 PROCEDURE — 80074 ACUTE HEPATITIS PANEL: CPT | Performed by: INTERNAL MEDICINE

## 2017-05-08 PROCEDURE — 73130 X-RAY EXAM OF HAND: CPT

## 2017-05-08 PROCEDURE — 73110 X-RAY EXAM OF WRIST: CPT

## 2017-05-08 PROCEDURE — 85652 RBC SED RATE AUTOMATED: CPT | Performed by: SPECIALIST

## 2017-05-08 PROCEDURE — 97161 PT EVAL LOW COMPLEX 20 MIN: CPT

## 2017-05-08 PROCEDURE — 86430 RHEUMATOID FACTOR TEST QUAL: CPT | Performed by: INTERNAL MEDICINE

## 2017-05-08 RX ORDER — HYDROCODONE BITARTRATE AND ACETAMINOPHEN 5; 325 MG/1; MG/1
1 TABLET ORAL
Status: DISCONTINUED | OUTPATIENT
Start: 2017-05-08 | End: 2017-05-10 | Stop reason: HOSPADM

## 2017-05-08 RX ORDER — CELECOXIB 100 MG/1
100 CAPSULE ORAL 2 TIMES DAILY
Status: DISCONTINUED | OUTPATIENT
Start: 2017-05-08 | End: 2017-05-08

## 2017-05-08 RX ORDER — AMOXICILLIN 250 MG
1 CAPSULE ORAL DAILY
Status: DISCONTINUED | OUTPATIENT
Start: 2017-05-08 | End: 2017-05-10 | Stop reason: HOSPADM

## 2017-05-08 RX ORDER — KETOROLAC TROMETHAMINE 15 MG/ML
15 INJECTION, SOLUTION INTRAMUSCULAR; INTRAVENOUS EVERY 6 HOURS
Status: COMPLETED | OUTPATIENT
Start: 2017-05-08 | End: 2017-05-09

## 2017-05-08 RX ORDER — CLINDAMYCIN HYDROCHLORIDE 150 MG/1
300 CAPSULE ORAL EVERY 6 HOURS
Status: DISCONTINUED | OUTPATIENT
Start: 2017-05-08 | End: 2017-05-10 | Stop reason: HOSPADM

## 2017-05-08 RX ORDER — INSULIN GLARGINE 100 [IU]/ML
65 INJECTION, SOLUTION SUBCUTANEOUS
Status: DISCONTINUED | OUTPATIENT
Start: 2017-05-08 | End: 2017-05-10 | Stop reason: HOSPADM

## 2017-05-08 RX ADMIN — GABAPENTIN 300 MG: 300 CAPSULE ORAL at 08:27

## 2017-05-08 RX ADMIN — Medication 2 MG: at 11:36

## 2017-05-08 RX ADMIN — HEPARIN SODIUM 5000 UNITS: 5000 INJECTION, SOLUTION INTRAVENOUS; SUBCUTANEOUS at 11:36

## 2017-05-08 RX ADMIN — LACTULOSE 20 G: 20 SOLUTION ORAL at 15:27

## 2017-05-08 RX ADMIN — Medication 10 ML: at 05:21

## 2017-05-08 RX ADMIN — ROSUVASTATIN CALCIUM 5 MG: 5 TABLET ORAL at 21:56

## 2017-05-08 RX ADMIN — SERTRALINE HYDROCHLORIDE 50 MG: 50 TABLET ORAL at 08:27

## 2017-05-08 RX ADMIN — VANCOMYCIN HYDROCHLORIDE 1500 MG: 10 INJECTION, POWDER, LYOPHILIZED, FOR SOLUTION INTRAVENOUS at 11:36

## 2017-05-08 RX ADMIN — STANDARDIZED SENNA CONCENTRATE AND DOCUSATE SODIUM 1 TABLET: 8.6; 5 TABLET, FILM COATED ORAL at 15:27

## 2017-05-08 RX ADMIN — CLINDAMYCIN HYDROCHLORIDE 300 MG: 150 CAPSULE ORAL at 23:54

## 2017-05-08 RX ADMIN — ASPIRIN 81 MG: 81 TABLET, COATED ORAL at 08:27

## 2017-05-08 RX ADMIN — INSULIN LISPRO 4 UNITS: 100 INJECTION, SOLUTION INTRAVENOUS; SUBCUTANEOUS at 22:04

## 2017-05-08 RX ADMIN — Medication 10 ML: at 15:27

## 2017-05-08 RX ADMIN — ARIPIPRAZOLE 10 MG: 10 TABLET ORAL at 08:27

## 2017-05-08 RX ADMIN — EPLERENONE 25 MG: 25 TABLET, FILM COATED ORAL at 08:27

## 2017-05-08 RX ADMIN — TRAZODONE HYDROCHLORIDE 100 MG: 100 TABLET ORAL at 21:57

## 2017-05-08 RX ADMIN — Medication 2 MG: at 03:14

## 2017-05-08 RX ADMIN — GABAPENTIN 300 MG: 300 CAPSULE ORAL at 21:56

## 2017-05-08 RX ADMIN — FAMOTIDINE 20 MG: 20 TABLET ORAL at 17:08

## 2017-05-08 RX ADMIN — INSULIN GLARGINE 65 UNITS: 100 INJECTION, SOLUTION SUBCUTANEOUS at 22:03

## 2017-05-08 RX ADMIN — LEVOTHYROXINE SODIUM 100 MCG: 100 TABLET ORAL at 08:27

## 2017-05-08 RX ADMIN — FAMOTIDINE 20 MG: 20 TABLET ORAL at 08:27

## 2017-05-08 RX ADMIN — Medication 10 ML: at 21:57

## 2017-05-08 RX ADMIN — IBUPROFEN 400 MG: 400 TABLET, FILM COATED ORAL at 08:28

## 2017-05-08 RX ADMIN — GABAPENTIN 300 MG: 300 CAPSULE ORAL at 15:27

## 2017-05-08 RX ADMIN — CELECOXIB 100 MG: 100 CAPSULE ORAL at 17:08

## 2017-05-08 RX ADMIN — INSULIN LISPRO 6 UNITS: 100 INJECTION, SOLUTION INTRAVENOUS; SUBCUTANEOUS at 12:55

## 2017-05-08 RX ADMIN — HEPARIN SODIUM 5000 UNITS: 5000 INJECTION, SOLUTION INTRAVENOUS; SUBCUTANEOUS at 04:37

## 2017-05-08 RX ADMIN — DIVALPROEX SODIUM 250 MG: 250 TABLET, DELAYED RELEASE ORAL at 21:57

## 2017-05-08 RX ADMIN — CLINDAMYCIN HYDROCHLORIDE 300 MG: 150 CAPSULE ORAL at 17:08

## 2017-05-08 RX ADMIN — KETOROLAC TROMETHAMINE 15 MG: 15 INJECTION, SOLUTION INTRAMUSCULAR; INTRAVENOUS at 21:56

## 2017-05-08 RX ADMIN — HYDROCODONE BITARTRATE AND ACETAMINOPHEN 1 TABLET: 5; 325 TABLET ORAL at 17:08

## 2017-05-08 NOTE — CONSULTS
Patient seen, examined, chart reviewed, and consult dictated. Patient notes pain in both wrists and hands for the past several months, but pain was aggravated by a recent fall that occurred about 2 weeks ago. Patient is currently being treated for left wrist abscess with Clindamycin 300 mg Q6H. She notes that even prior to her fall, she was having pain and swelling in her peripheral joints. Her pain level was about 8/10, for which she took tylenol. X-Rays taken during her submission reveals possible fracture of the left coronoid process of the left ulna. X-Ray images of the left elbow also showed moderate effusion on May 5th, 2017. On physical examination, T: 98, BP: 140/79, RR: 18, P: 72  Lungs are clear  Heart: Regular, Rate, and Rhythm  MS: Palpable tenderness of both shoulders, elbows, wrists, MCPs, PIPs, DIPs. Both knees tender to palpation. Ankles tender to palpation. Swelling noted in both elbows and wrists, as well as diffuse swelling of both hands. Impaired movement of both hands. Decreased  strength bilaterally. Gait not evaluable. Cranial nerves II-XII grossly intact. Impression: inflammatory polyarthritis, some features may be attributed to her underlying infection. However, must also consider and rule out Rheumatoid Arthritis, Gouty Arthritis, Septic Arthritis, Inflammatory Osteoarthritis, and nonspecific connective tissue disease. In view of her history of Sarcoidosis, Sarcoid Arthropathy must also be included in the differential diagnosis. Recommendations/Plan:   -X-Ray of the right wrist and hand  -Anti-CCP  -CRP  -Sed rate  -Toradol 15 mg IV Q6H x3 dosages. Thank you very much for allowing me to see this interesting lady. Corwin Persaud.  MD Shahab., F.A.C.R.  05/08/2017

## 2017-05-08 NOTE — PROCEDURES
[facility]  *** FINAL RE[facility] ***    Name: Missy Flores  MRN: [id number]    Inpatient  : 12 Sep 1955  HIS Order #: [id number]  [address] [facility] Visit #: [id number]  Date: 08 May 2017    TYPE OF TEST: Peripheral Venous Testing    REASON FOR TEST  Limb swelling    Right Arm:-  Deep venous thrombosis:           No  Superficial venous thrombosis:    No      INTERPRETATION/FINDINGS  Right arm :  Duplex images were obtained using 2-D gray scale, color flow, and  spectral Doppler analysis. 1. No evidence of deep venous thrombosis detected in the veins  visualized. 2. Deep veins visualized include the internal jugular, subclavian,  axillary and brachial veins. 3. No evidence of superficial thrombosis detected. 4. Superficial veins visualized include the basilic (upper arm) and  cephalic (upper arm) veins. 5. No evidence of deep vein thrombosis in the contralateral subclavian   or internal jugular veins. ADDITIONAL COMMENTS    I have personally reviewed the data relevant to the interpretation of  this  study. TECHNOLOGIST: Reynaldo Silva RVT  Signed: 2017 03:00 PM    PHYSICIAN: Lakisha Connor MD  Signed: 2017 08:59 AM

## 2017-05-08 NOTE — ROUTINE PROCESS
Bedside and Verbal shift change report given to [address] [place] [address] (oncoming nurse) by Rene Norton RN (offgoing nurse). Report included the following information SBAR, Kardex, MAR and Recent Results. SITUATION:    Code Status: Full Code   Reason for Admission: SIRS (systemic inflammatory response syndrome) ([facility]    [facility] day: 6   Problem List:       Hospital Problems  Date Reviewed: 4/17/2017          Codes Class Noted POA    Cellulitis of right elbow ICD-10-CM: Z96.707  ICD-9-CM: 682.3  5/4/2017 Yes        Olecranon bursitis of right elbow ICD-10-CM: M70.21  ICD-9-CM: 726.33  5/4/2017 Yes        Contusion of elbow ICD-10-CM: S50.00XA  ICD-9-CM: 923.11  5/4/2017 Yes        SIRS (systemic inflammatory response syndrome) ([facility] ICD-10-CM: R65.10  ICD-9-CM: 995.90  5/2/2017 Unknown              BACKGROUND:    Past Medical History:   Past Medical History:   Diagnosis Date    Anxiety     Asthma     Back injury 1986    jumped out of second story window    Behavioral change     Bilateral knee pain     Bipolar disorder ([facility]     Chronic airway obstruction, not elsewhere classified     SOB, on paula O2 Recent admission with psychosis    Chronic back pain     Chronic diastolic heart failure (HCC)     Stable on diuretics    Chronic kidney disease     stage III    Congestive heart failure (HCC)     COPD (chronic obstructive pulmonary disease) ([facility] 10/31/2012    Cramps, muscle, general     Depression     Diabetes mellitus     Difficulty speaking     Difficulty walking     Difficulty writing     DJD (degenerative joint disease) of knee     bilateral, mild    Edema     The edema involves both lower extremities. The episodes last for 1 week. The patient describes this as worsening. Symptoms are exacerbated by prolonged sitting. Symptoms are relieved by leg elevation and diuretics. NO CHANGE    Essential hypertension, benign     Better controlled    Falls frequently     Fatigue     Generalized stiffness     Headache     Heartburn     Hiatal hernia     History of hepatitis C     treated    Hoarseness of voice     Hypothyroidism     Lung disease     Memory difficulty     Morbid obesity (HCC)     Multiple joint pain     Nausea     Numbness and tingling     arms and legs, related to diabetic neuropathy    Osteoarthritis of left knee     Other and unspecified hyperlipidemia     LDL reportedly very high/started on crestor recently by diabetic doctor    Oxygen dependent     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     Sarcoidosis ([facility]     Sickle cell trait ([facility]     Sinusitis     SOB (shortness of breath)     STD (female)     herpes    Swelling of body region     legs and feet    Tinnitus     Venous insufficiency     Vertigo     Weakness generalized     Wears glasses     Weight gain          Patient taking anticoagulants yes     ASSESSMENT:    Changes in Assessment Throughout Shift: none     Patient has Central Line: no Reasons if yes: none   Patient has Renae Cath: no Reasons if yes: n/a      Last Vitals:     Vitals:    05/07/17 1222 05/07/17 1627 05/07/17 2055 05/08/17 0318   BP: 112/74 113/75 124/79 119/72   Pulse: 63 72 74 99   Resp: 16 18 18 18   Temp: 97.1 °F (36.2 °C) 97.3 °F (36.3 °C) 98.5 °F (36.9 °C) 97.4 °F (36.3 °C)   SpO2: 100% 96% 100% 99%   Weight:       Height:       LMP: 11/25/2012        IV and DRAINS (will only show if present)   [REMOVED] Peripheral IV 05/02/17 Right Wrist-Site Assessment: Clean, dry, & intact  Peripheral IV 05/05/17 Right Forearm-Site Assessment: Clean, dry, & intact  [REMOVED] Peripheral IV 05/01/17 Right Forearm-Site Assessment: Clean, dry, & intact  [REMOVED] Peripheral IV 05/01/17 Right Hand-Site Assessment: Clean, dry, & intact     WOUND (if present)   Wound Type: Incision and drainage   Dressing present Dressing Present : No   Wound Concerns/Notes:  Wound mgt.      PAIN    Pain Assessment    Pain Intensity 1: 7 (05/08/17 4861)    Pain Location 1: Arm    Pain Intervention(s) 1: Medication (see MAR)    Patient Stated Pain Goal: 0  o Interventions for Pain:  Pain mgt.  o Intervention effective: yes  o Time of last intervention: 0600   o Reassessment Completed: yes      Last 3 Weights:  Last 3 Recorded Weights in this Encounter    05/01/17 2213 05/02/17 2241   Weight: 95.3 kg (210 lb) 95.3 kg (210 lb)     Weight change:      INTAKE/OUPUT    Current Shift:      Last three shifts: 05/06 1901 - 05/08 0700  In: 1300 [P.O.:800; I.V.:500]  Out: 1250 [Urine:1250]     LAB RESULTS     Recent Labs      05/06/17   0433   WBC  8.0   HGB  10.2*   HCT  30.7*   PLT  185        No results for input(s): NA, K, GLU, BUN, CREA, CA, MG, INR in the last 72 hours. No lab exists for component: PT, PTT, INREXT, INREXT    RECOMMENDATIONS AND DISCHARGE PLANNING     1. Pending tests/procedures/ Plan of Care or Other Needs: follow up labs     2. Discharge plan for patient and Needs/Barriers: PT OT    3. Estimated Discharge Date: 5/10/17 Posted on Whiteboard in \[facility]\"": yes      4. The patient's care plan was reviewed with the oncoming nurse. \"HEALS\" SAFETY CHECK      Fall Risk    Total Score: 3    Safety Measures: Safety Measures: Bed/Chair-Wheels locked, Bed in low position, Call light within reach    A safety check occurred in the patient's room between off going nurse and oncoming nurse listed above.     The safety check included the below items  Area Items   H  High Alert Medications - Verify all high alert medication drips (heparin, PCA, etc.)   E  Equipment - Suction is set up for ALL patients (with yanker)  - Red plugs utilized for all equipment (IV pumps, etc.)  - WOWs wiped down at end of shift.  - Room stocked with oxygen, suction, and other unit-specific supplies   A  Alarms - Bed alarm is set for fall risk patients  - Ensure chair alarm is in place and activated if patient is up in a chair   L  Lines - Check IV for any infiltration  - Renae bag is empty if patient has a Renae   - Tubing and IV bags are labeled   S  Safety   - Room is clean, patient is clean, and equipment is clean. - Hallways are clear from equipment besides carts. - Fall bracelet on for fall risk patients  - Ensure room is clear and free of clutter  - Suction is set up for ALL patients (with yanker)  - Hallways are clear from equipment besides carts.    - Isolation precautions followed, supplies available outside room, sign posted     Rene Norton RN

## 2017-05-08 NOTE — PROGRESS NOTES
Problem: Mobility Impaired (Adult and Pediatric)  Goal: *Acute Goals and Plan of Care (Insert Text)  Physical Therapy Goals  Initiated 5/8/2017 and to be accomplished within 7 day(s)  1. Patient will move from supine <> sit with Mod-Min A in prep for out of bed activity and change of position. 2. Patient will perform sit<> stand with Mod-Min A with rolling walker in prep for transfers/ambulation. 3. Patient will transfer from bed <> chair with Mod A with rolling walker for time up in chair for completion of ADL activity. 4. Patient will ambulate 150 feet with LRAD for increase functional mobility. 5. Patient will ascend/descend 3-5 stairs with handrail(s) with Mod A-Min A for home re-entry as needed. Outcome: Progressing Towards Goal  PHYSICAL THERAPY EVALUATION     Patient: Vandana Sinha (58 y.o. female)  Date: 5/8/2017  Primary Diagnosis: SIRS (systemic inflammatory response syndrome) (HCC)        Precautions: Fall         ASSESSMENT :  Based on the objective data described below, the patient presents with deficits in bed mobility, transfers, and gait secondary to generalized weakness and possible pain in b/l UE and LE. Pt presents w/ L UE  stength of 2/5 and reports pain in L elbow which affects pt's ability to properly perform any bed mobility or transfer activities. Pt required Max A w/ Additional time in order to perform transfer activity supine>sit. Pt requires Max A to complete the task of scooting to the EOB. Pt required Max A in order to perform sit>stand transfer activity w/ RW/GB. Pt was able to take 2 side steps from the bed to the bedside commode and required Max A to perform activity. Patient Care Tech assisted w/ the rest of the assessment for assist for safety of the pt. Once pt was finished using the bedside commode pt required Max A x 2 to perform sit>stand activity w RW/GB. Pt required Max A x 2 in order to take 2 steps to the bed.  Pt required Max A x 2 to perform stand>sit activity. Pt required Max A x 2 to perform bed mobility task sit>supine. Patient care tech was present in the room prior to exiting. Nurse notified after assessment. Patient will benefit from skilled intervention to address the above impairments. Patients rehabilitation potential is considered to be Fair  Factors which may influence rehabilitation potential include:   [X]         None noted  [ ]         Mental ability/status  [ ]         Medical condition  [ ]         Home/family situation and support systems  [ ]         Safety awareness  [ ]         Pain tolerance/management  [ ]         Other:        PLAN :  Recommendations and Planned Interventions:  [X]           Bed Mobility Training             [ ]    Neuromuscular Re-Education  [X]           Transfer Training                   [ ]    Orthotic/Prosthetic Training  [X]           Gait Training                          [ ]    Modalities  [X]           Therapeutic Exercises          [ ]    Edema Management/Control  [X]           Therapeutic Activities            [ ]    Patient and Family Training/Education  [ ]           Other (comment):     Frequency/Duration: Patient will be followed by physical therapy daily to address goals. Discharge Recommendations: Jonathan Caldwell  Further Equipment Recommendations for Discharge: rolling walker       SUBJECTIVE:   Patient stated I am ready to get up to use the restroom.       OBJECTIVE DATA SUMMARY:       Past Medical History:   Diagnosis Date    Anxiety      Asthma      Back injury 1986     jumped out of second story window    Behavioral change      Bilateral knee pain      Bipolar disorder ([facility] [address]      Chronic airway obstruction, not elsewhere classified       SOB, on paula O2 Recent admission with psychosis    Chronic back pain      Chronic diastolic heart failure (HCC)       Stable on diuretics    Chronic kidney disease       stage III    Congestive heart failure (HCC)      COPD (chronic obstructive pulmonary disease) ([facility] [address] 10/31/2012    Cramps, muscle, general      Depression      Diabetes mellitus      Difficulty speaking      Difficulty walking      Difficulty writing      DJD (degenerative joint disease) of knee       bilateral, mild    Edema       The edema involves both lower extremities. The episodes last for 1 week. The patient describes this as worsening. Symptoms are exacerbated by prolonged sitting. Symptoms are relieved by leg elevation and diuretics. NO CHANGE    Essential hypertension, benign       Better controlled    Falls frequently      Fatigue      Generalized stiffness      Headache      Heartburn      Hiatal hernia      History of hepatitis C       treated    Hoarseness of voice      Hypothyroidism      Lung disease      Memory difficulty      Morbid obesity (HCC)      Multiple joint pain      Nausea      Numbness and tingling       arms and legs, related to diabetic neuropathy    Osteoarthritis of left knee      Other and unspecified hyperlipidemia       LDL reportedly very high/started on crestor recently by diabetic doctor    Oxygen dependent      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      Sarcoidosis ([facility] [address]      Sickle cell trait ([facility] [address]      Sinusitis      SOB (shortness of breath)      STD (female)       herpes    Swelling of body region       legs and feet    Tinnitus      Venous insufficiency      Vertigo      Weakness generalized      Wears glasses      Weight gain       Past Surgical History:   Procedure Laterality Date    HX BACK SURGERY       HX  SECTION        HX TUBAL LIGATION         Barriers to Learning/Limitations: None  Compensate with: Visual Cues, Verbal Cues and Tactile Cues  Prior Level of Function/Home Situation:   Home Situation  Home Environment: Apartment  # Steps to Enter: 5  Rails to Enter: Yes  Hand Rails : Bilateral  One/Two Story Residence: One story  Living Alone: Yes  Support Systems: Other (comments) (Nurse Aid)  Patient Expects to be Discharged to[de-identified] Private residence  Current DME Used/Available at Home: Walker, rolling  Tub or Shower Type: Tub/Shower combination (Step in tub)  Critical Behavior:  Neurologic State: Alert  Orientation Level: Oriented X4  Cognition: Appropriate decision making; Appropriate safety awareness; Follows commands  Safety/Judgement: Awareness of environment  Psychosocial  Purposeful Interaction: Yes  Pt Identified Daily Priority: Clinical issues (comment)  [facility] Process: Teaching/learning; Attend basic human needs;Create healing environment  Caring Interventions: Therapeutic modalities  Reassure: Informing; Acceptance  Therapeutic Modalities: Music/Imagery channel ([facility] only)  Skin Condition/Temp: Warm  Skin Integrity: Wound (add Wound LDA)  Skin Integumentary  Skin Color: Appropriate for ethnicity  Skin Condition/Temp: Warm  Skin Integrity: Wound (add Wound LDA)  Turgor: Non-tenting  Hair Growth: Present  Varicosities: Absent  Strength:    Strength: Generally decreased, functional  Range Of Motion:  AROM: Generally decreased, functional  Functional Mobility:  Bed Mobility:  Supine to Sit: Maximum assistance; Additional time  Sit to Supine: Maximum assistance; Additional time;Assist x2  Scooting: Moderate assistance  Transfers:  Sit to Stand: Maximum assistance  Stand to Sit: Maximum assistance;Assist x2  Balance:   Sitting: Intact  Standing: Impaired; With support  Standing - Static: Poor  Standing - Dynamic : Poor  Ambulation/Gait Training:  Distance (ft): 4 Feet (ft)  Assistive Device: Walker, rolling  Ambulation - Level of Assistance: Maximum assistance  Gait Description (WDL): Exceptions to WDL  Gait Abnormalities: Decreased step clearance  Base of Support: Narrowed  Speed/Natasha: Pace decreased (<100 feet/min)  Pain:  Pain Scale 1: Numeric (0 - 10)  Pain Intensity 1: 5  Pain Location 1: Back  Pain Orientation 1: Left;Right  Activity Tolerance:   Fair-poor  Please refer to the flowsheet for vital signs taken during this treatment. After treatment:   [ ]         Patient left in no apparent distress sitting up in chair  [X]         Patient left in no apparent distress in bed  [X]         Call bell left within reach  [X]         Nursing notified  [ ]         Caregiver present  [ ]         Bed alarm activated      COMMUNICATION/EDUCATION:   [X]         Fall prevention education was provided and the patient/caregiver indicated understanding. [X]         Patient/family have participated as able in goal setting and plan of care. [X]         Patient/family agree to work toward stated goals and plan of care. [ ]         Patient understands intent and goals of therapy, but is neutral about his/her participation. [ ]         Patient is unable to participate in goal setting and plan of care.      Thank you for this referral.  Cony Navarro, PT   Time Calculation: 39 mins

## 2017-05-08 NOTE — PROGRESS NOTES
Care Management Interventions  PCP Verified by CM: Yes Mihir Garcia  Palliative Care Consult (Criteria: CHF and RRAT>21): No  Reason for No Palliative Care Consult:  Other (see comment)  Mode of Transport at Discharge: BLS (pt will require transport)  Transition of Care Consult (CM Consult): Discharge Planning, SNF  Physical Therapy Consult: Yes  Occupational Therapy Consult: Yes  Current Support Network: Lives with Spouse, Family Lives [place], Own Home  Confirm Follow Up Transport: Family  Plan discussed with Pt/Family/Caregiver: Yes (it has been recommended that pt be placed in a local SNF and pt agrees)  Freedom of Choice Offered: Yes ([address])  Discharge Location  Discharge Placement: Skilled nursing facility

## 2017-05-08 NOTE — PROGRESS NOTES
[facility] Hospitalist Group  Progress Note    Patient: Reji Sinha Age: 62 y.o. : 1955 MR#: [id number] SSN: [id number]  Date/Time: 2017     Subjective:     No BM yet. Diffuse joint pain present. Back pain is chronic. Uses 2 lpm oxygen at home. No SOB or cough. No N/V or abdominal pain. Daughter is at bedside      Objective:     VS:   Visit Vitals    /71 (BP 1 Location: Left arm, BP Patient Position: Supine)    Pulse 72    Temp 97.5 °F (36.4 °C)    Resp 18    Ht 5' 5\" (1.651 m)    Wt 95.3 kg (210 lb)    LMP 2012    SpO2 97%    BMI 34.95 kg/m2      Tmax/24hrs: Temp (24hrs), Av.6 °F (36.4 °C), Min:97.3 °F (36.3 °C), Max:98.5 °F (36.9 °C)  IOBRIEF    Intake/Output Summary (Last 24 hours) at 17 1343  Last data filed at 17 1136   Gross per 24 hour   Intake              540 ml   Output              950 ml   Net             -410 ml       General:  Alert, cooperative, no acute distress   Pulmonary:  CTA Bilaterally. No Wheezing/Rhonchi/Rales. Cardiovascular: Regular rate and Rhythm. GI:  Soft, Non distended, Non tender. + Bowel sounds. Extremities: no pedal edema. Diffuse swelling of elbows and ankles  Neurologic: follows commands     Assessment:     1. SIRS due to right forearm/elbow cellulitis. 2. H/O IVDA related abscess on left hand. 3. DM2 with A1c of 10.8  4. Chronic hypoxic respiratory failure and COPD   5. Hypothyroidism  6. Polyarticular pain and stiffness  7. Hypokalemia   8. 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 9. HTN. 10. H/o Hep C   11.  Constipation     PLAN:    Ortho input noted  Doppler is negative per vascular tech  Will treat constipation  Cont antibiotic per ID  Talked to dr. Casey Diop - rheumatologist  Change to Celebrex  PT/OT and SNF placement    Case discussed with:  [x]Patient  [x]Family  []Nursing  []Case Management  DVT Prophylaxis:  []Lovenox  [x]Hep SQ  []SCDs  []Coumadin   []On Heparin gtt    Labs:    Recent Results (from the past 24 hour(s))   GLUCOSE, POC    Collection Time: 05/07/17  4:32 PM   Result Value Ref Range    Glucose (POC) 183 (H) 70 - 110 mg/dL   GLUCOSE, POC    Collection Time: 05/07/17  9:17 PM   Result Value Ref Range    Glucose (POC) 175 (H) 70 - 110 mg/dL   GLUCOSE, POC    Collection Time: 05/08/17  8:26 AM   Result Value Ref Range    Glucose (POC) 135 (H) 70 - 110 mg/dL   GLUCOSE, POC    Collection Time: 05/08/17 12:51 PM   Result Value Ref Range    Glucose (POC) 213 (H) 70 - 110 mg/dL       Signed By: Tamiko Castaneda MD     May 8, 2017

## 2017-05-08 NOTE — PROGRESS NOTES
[facility]         Stephen A Roots resting at this time. She c/o right posterior heel pain this AM.      Visit Vitals    /72    Pulse 99    Temp 97.4 °F (36.3 °C)    Resp 18    Ht 5' 5\" (1.651 m)    Wt 210 lb (95.3 kg)    SpO2 99%    BMI 34.95 kg/m2       On examination 5/8/2017 , the patient is alert, oriented (name, place, time) and follows commands. she is in no acute distress and her affect and mood are appropriate. Left hand: dressings in place; NT flexor/extensor left hand tendons. No fluctuance. Left dorsum foot: dry eschar in place: no pus/fluctuance  Right arm/elbow: cellulitis resolving nicely. No evidence of septic olecranon bursitis: still with posterior distal humerus skin edema. Right ankle: mild tenderness to distal posterior achilles region. No erythema/fluctuance noted. AROM/PROM non tender at right ankle    NO new labs at this time:     CMP: No results found for: NA, K, CL, CO2, AGAP, GLU, BUN, CREA, GFRAA, GFRNA, CA, MG, PHOS, ALB, TBIL, TP, ALB, GLOB, AGRAT, SGOT, ALT, GPT  CBC: No results found for: WBC, HGB, HGBEXT, HCT, HCTEXT, PLT, PLTEXT, HGBEXT, HCTEXT, PLTEXT       AP  1. H/o MRSA abscess left hand/left foot April 2017: looks good at this point: continue wound care  2. Left elbow contusion: no Fx  3. Right arm cellulitis: improving nicely  4.  Right posterior achilles tendonitis: PT        Farhana Mcdonald MD  5/8/2017  7:04 AM

## 2017-05-08 NOTE — PROGRESS NOTES
Infectious Disease Progress Note    Requested by: dr. Yoon Smith for possible sepsis, partially treated left hand/foot infection    Current abx Prior abx   Vancomycin since 5/4      Lines:       Assessment :    60-year-old black female with h/o IVDA ( last used IV heroin and cocaine 2  days prior to admission) admitted to [facility] on 4/12/17 with increasing left hand and foot pain.         Recent hospitalization for left foot and left hand cellulitis, abscess. Exact microbial etiology of infection not clear. Patient has been appropriately managed with I&D on 4/17/17. Cultures: strep viridans, cons s/p levofloxacin    Now admitted s/p fall, possible avulsion fracture left ulno humeral joint, low grade fevers    I agree that clinical picture is consistent with SIRS. No evidence of worsening infection of left foot on today's exam. No erythema, swelling or purulent drainage from left hand ulcer per ED report. Clinical suspicion of sepsis is low. SIRS may be due to left elbow fracture, blunt trauma to right knee/ankle. Ortho consult appreciated    Increased swelling right elbow - ?cellulitis versus recent trauma related. Clinically better.     Antibiotic management complicated due to  life threatening allergy to pcn (anaphylaxis 30 years ago). Non life threatening allergy to moxifloxacin. She has tolerated levofloxacin. Recommendations:     1. Discontinue  Vancomycin. Start clindamycin for 3 days  2. F/u ortho recommendations  3. D/c planning per primary team.     Above plan was discussed in details with patient,  and dr Calix Early. Please call me if any further questions or concerns. Will continue to participate in the care of this patient. subjective:    improved pain both elbows. Patient denies headaches, visual disturbances, sore throat, runny nose, earaches, cp, sob, chills, cough, abdominal pain, diarrhea, burning micturition, or weakness in extremities. she denies back pain/flank pain. .        home Medication List    Details   oxyCODONE-acetaminophen (PERCOCET) 5-325 mg per tablet Take 1 Tab by mouth every eight (8) hours as needed for Pain. Max Daily Amount: 3 Tabs. Qty: 15 Tab, Refills: 0      umeclidinium-vilanterol (ANORO ELLIPTA) 62.5-25 mcg/actuation inhaler Take 1 Puff by inhalation daily. Qty: 1 Inhaler, Refills: 5      VENTOLIN HFA 90 mcg/actuation inhaler INHALE 2 PUFFS EVERY 4 HOURS AS NEEDED  Qty: 1 Inhaler, Refills: 4      divalproex DR (DEPAKOTE) 250 mg tablet Take 250 mg by mouth nightly. furosemide (LASIX) 20 mg tablet Take 1 Tab by mouth daily. Qty: 30 Tab, Refills: 0      metOLazone (ZAROXOLYN) 5 mg tablet Take 0.5 Tabs by mouth Every Mon, Wed & Sun.  Qty: 30 Tab, Refills: 6      potassium chloride (KLOR-CON) 20 mEq packet Take 1 Packet by mouth daily. Qty: 30 Each, Refills: 0      clopidogrel (PLAVIX) 75 mg tablet Take 1 Tab by mouth daily. Qty: 30 Tab, Refills: 0      insulin glargine (LANTUS) 100 unit/mL injection 60 units subcutaneously every night  Qty: 1 Vial, Refills: 0      insulin syringe,safetyneedle 1 mL 30 gauge x 5/16\" syrg As directed  Qty: 50 Each, Refills: 0      ARIPiprazole (ABILIFY) 5 mg tablet Take 10 mg by mouth daily. Comments: take 2 tabs each morning      albuterol (PROVENTIL VENTOLIN) 2.5 mg /3 mL (0.083 %) nebulizer solution USE 1 NEB EVERY 4 HOURS FOR WHEEZING AND SHORTNESS OF BREATH  Indications: CHRONIC OBSTRUCTIVE PULMONARY DISEASE  Qty: 2 Package, Refills: 3    Associated Diagnoses: Chronic obstructive pulmonary disease, unspecified COPD type (HCC)      eplerenone (INSPRA) 25 mg tablet Take  by mouth daily. gabapentin (NEURONTIN) 300 mg capsule Take 300 mg by mouth three (3) times daily. acetaminophen (TYLENOL) 325 mg tablet Take 325 mg by mouth every six (6) hours as needed for Pain. traZODone (DESYREL) 100 mg tablet Take 100 mg by mouth nightly. rosuvastatin (CRESTOR) 5 mg tablet Take 1 Tab by mouth nightly.   Qty: 30 Tab, Refills: 6      l-methylfolate-vit b12-vit b6 (METANX) 2.8-2-25 mg Tab Take  by mouth. aspirin 81 mg tablet Take 81 mg by mouth daily. Nebulizer Accessories Kit Use with nebulizer machine  Qty: 1 Kit, Refills: prn    Associated Diagnoses: COPD (chronic obstructive pulmonary disease) ([facility])      insulin CONCENTRATED regular (U-500 CONCENTRATED) 500 unit/mL Soln 20 Units by SubCUTAneous route. With breakfast, lunch, and dinner      levothyroxine (SYNTHROID) 100 mcg tablet Take  by mouth Daily (before breakfast). sertraline (ZOLOFT) 100 mg tablet Take 50 mg by mouth daily. Oxygen-Air Delivery Systems by Nasal route continuous. 2 LNC      ergocalciferol (VITAMIN D) 50,000 unit capsule Take 50,000 Units by mouth daily.              Current Facility-Administered Medications   Medication Dose Route Frequency    ibuprofen (MOTRIN) tablet 400 mg  400 mg Oral TID    famotidine (PEPCID) tablet 20 mg  20 mg Oral BID    morphine injection 2 mg  2 mg IntraVENous Q4H PRN    vancomycin (VANCOCIN) 1,500 mg in 0.9% sodium chloride 500 mL IVPB  1,500 mg IntraVENous Q12H    acetaminophen (TYLENOL) tablet 650 mg  650 mg Oral Q6H PRN    albuterol-ipratropium (DUO-NEB) 2.5 MG-0.5 MG/3 ML  3 mL Nebulization Q4H PRN    ARIPiprazole (ABILIFY) tablet 10 mg  10 mg Oral DAILY    aspirin delayed-release tablet 81 mg  81 mg Oral DAILY    divalproex DR (DEPAKOTE) tablet 250 mg  250 mg Oral QHS    eplerenone (INSPRA) tablet 25 mg  25 mg Oral DAILY    gabapentin (NEURONTIN) capsule 300 mg  300 mg Oral TID    insulin glargine (LANTUS) injection 60 Units  60 Units SubCUTAneous QHS    levothyroxine (SYNTHROID) tablet 100 mcg  100 mcg Oral ACB    rosuvastatin (CRESTOR) tablet 5 mg  5 mg Oral QHS    sertraline (ZOLOFT) tablet 50 mg  50 mg Oral DAILY    traZODone (DESYREL) tablet 100 mg  100 mg Oral QHS    umeclidinium-vilanterol (ANORO ELLIPTA) 62.5 mcg- 25 mcg/inhalation  1 Puff Inhalation DAILY    sodium chloride (NS) flush 5-10 mL  5-10 mL IntraVENous Q8H    sodium chloride (NS) flush 5-10 mL  5-10 mL IntraVENous PRN    heparin (porcine) injection 5,000 Units  5,000 Units SubCUTAneous Q8H    insulin lispro (HUMALOG) injection   SubCUTAneous AC&HS       Allergies: Moxifloxacin; Pcn [penicillins]; and Sulfa (sulfonamide antibiotics)    Temp (24hrs), Av.5 °F (36.4 °C), Min:97.1 °F (36.2 °C), Max:98.5 °F (36.9 °C)    Visit Vitals    /76 (BP 1 Location: Left arm, BP Patient Position: Supine)    Pulse 72    Temp 97.4 °F (36.3 °C)    Resp 18    Ht 5' 5\" (1.651 m)    Wt 95.3 kg (210 lb)    LMP 2012    SpO2 100%    BMI 34.95 kg/m2       ROS: 12 point ROS obtained in details. Pertinent positives as mentioned in HPI,   otherwise negative    Physical Exam:    Constitutional: She is oriented to person, place, and time. She appears well-developed. No distress. Obese. Chronically ill appearing. HENT:   Head: Atraumatic. Mouth/Throat: Oropharynx is clear and moist.   Eyes: EOM are normal. Pupils are equal, round, and reactive to light. Neck: Normal range of motion. Cardiovascular: Regular rhythm and normal heart sounds. No murmur heard. Pulmonary/Chest: Effort normal. She has no rales. Bilateral air entry normal.  Abdominal: Soft. Bowel sounds are normal. There is no tenderness. There is no guarding. Musculoskeletal:   . Edema right elbow/right forearm significantly better. Rest of extremities exam unchanged  Neurological: She is alert and oriented to person, place, and time. No cranial nerve deficit. Skin: She is not diaphoretic. Psychiatric: She has a normal mood and affect. Nursing note and vitals reviewed. Labs: Results:   Chemistry No results for input(s): GLU, NA, K, CL, CO2, BUN, CREA, CA, AGAP, BUCR, TBIL, GPT, AP, TP, ALB, GLOB, AGRAT in the last 72 hours.    CBC w/Diff Recent Labs      17   0433   WBC  8.0   RBC  3.86*   HGB  10.2*   HCT  30.7*   PLT  185 Microbiology No results for input(s): CULT in the last 72 hours.        RADIOLOGY:    All available imaging studies/reports in [facility] for this admission were reviewed    Dr. Justin Puente, Infectious Disease Specialist  [phone]  May 8, 2017  11:13 AM

## 2017-05-08 NOTE — PROGRESS NOTES
Pt provided FOC for placement in SNF pt chose [address] [facility], because it is close to this pt's home.

## 2017-05-09 LAB
ANA TITR SER IF: NEGATIVE {TITER}
ANION GAP BLD CALC-SCNC: 8 MMOL/L (ref 3–18)
BASOPHILS # BLD AUTO: 0 K/UL (ref 0–0.06)
BASOPHILS # BLD: 0 % (ref 0–3)
BUN SERPL-MCNC: 18 MG/DL (ref 7–18)
BUN/CREAT SERPL: 14 (ref 12–20)
CALCIUM SERPL-MCNC: 9.1 MG/DL (ref 8.5–10.1)
CHLORIDE SERPL-SCNC: 106 MMOL/L (ref 100–108)
CO2 SERPL-SCNC: 28 MMOL/L (ref 21–32)
CREAT SERPL-MCNC: 1.31 MG/DL (ref 0.6–1.3)
CRP SERPL-MCNC: 9.8 MG/DL (ref 0–0.3)
DIFFERENTIAL METHOD BLD: ABNORMAL
EOSINOPHIL # BLD: 0.4 K/UL (ref 0–0.4)
EOSINOPHIL NFR BLD: 5 % (ref 0–5)
ERYTHROCYTE [DISTWIDTH] IN BLOOD BY AUTOMATED COUNT: 14 % (ref 11.6–14.5)
GLUCOSE BLD STRIP.AUTO-MCNC: 143 MG/DL (ref 70–110)
GLUCOSE BLD STRIP.AUTO-MCNC: 164 MG/DL (ref 70–110)
GLUCOSE BLD STRIP.AUTO-MCNC: 168 MG/DL (ref 70–110)
GLUCOSE BLD STRIP.AUTO-MCNC: 169 MG/DL (ref 70–110)
GLUCOSE SERPL-MCNC: 102 MG/DL (ref 74–99)
HAV IGM SERPL QL IA: NEGATIVE
HBV CORE IGM SER QL: NEGATIVE
HBV SURFACE AG SER QL: <0.1 INDEX
HBV SURFACE AG SER QL: NEGATIVE
HCT VFR BLD AUTO: 31.7 % (ref 35–45)
HCV AB SER IA-ACNC: >11 INDEX
HCV AB SERPL QL IA: POSITIVE
HCV COMMENT,HCGAC: ABNORMAL
HGB BLD-MCNC: 10.3 G/DL (ref 12–16)
LYMPHOCYTES # BLD AUTO: 26 % (ref 20–51)
LYMPHOCYTES # BLD: 2.1 K/UL (ref 0.8–3.5)
MAGNESIUM SERPL-MCNC: 1.6 MG/DL (ref 1.6–2.6)
MCH RBC QN AUTO: 26.1 PG (ref 24–34)
MCHC RBC AUTO-ENTMCNC: 32.5 G/DL (ref 31–37)
MCV RBC AUTO: 80.3 FL (ref 74–97)
MONOCYTES # BLD: 0.8 K/UL (ref 0–1)
MONOCYTES NFR BLD AUTO: 10 % (ref 2–9)
NEUTS SEG # BLD: 4.8 K/UL (ref 1.8–8)
NEUTS SEG NFR BLD AUTO: 59 % (ref 42–75)
PLATELET # BLD AUTO: 237 K/UL (ref 135–420)
PLATELET COMMENTS,PCOM: ABNORMAL
PMV BLD AUTO: 9.6 FL (ref 9.2–11.8)
POTASSIUM SERPL-SCNC: 4.5 MMOL/L (ref 3.5–5.5)
RBC # BLD AUTO: 3.95 M/UL (ref 4.2–5.3)
RBC MORPH BLD: ABNORMAL
SODIUM SERPL-SCNC: 142 MMOL/L (ref 136–145)
SP1: ABNORMAL
SP2: ABNORMAL
SP3: ABNORMAL
T4 FREE SERPL-MCNC: 1.2 NG/DL (ref 0.7–1.5)
TSH SERPL DL<=0.05 MIU/L-ACNC: 3.49 UIU/ML (ref 0.36–3.74)
URATE SERPL-MCNC: 6.5 MG/DL (ref 2.6–7.2)
WBC # BLD AUTO: 8.1 K/UL (ref 4.6–13.2)

## 2017-05-09 PROCEDURE — 36415 COLL VENOUS BLD VENIPUNCTURE: CPT | Performed by: INTERNAL MEDICINE

## 2017-05-09 PROCEDURE — 74011250636 HC RX REV CODE- 250/636: Performed by: SPECIALIST

## 2017-05-09 PROCEDURE — 97110 THERAPEUTIC EXERCISES: CPT

## 2017-05-09 PROCEDURE — 85025 COMPLETE CBC W/AUTO DIFF WBC: CPT | Performed by: INTERNAL MEDICINE

## 2017-05-09 PROCEDURE — 65270000029 HC RM PRIVATE

## 2017-05-09 PROCEDURE — 74011250637 HC RX REV CODE- 250/637: Performed by: INTERNAL MEDICINE

## 2017-05-09 PROCEDURE — 84550 ASSAY OF BLOOD/URIC ACID: CPT | Performed by: INTERNAL MEDICINE

## 2017-05-09 PROCEDURE — 74011250636 HC RX REV CODE- 250/636: Performed by: INTERNAL MEDICINE

## 2017-05-09 PROCEDURE — 97140 MANUAL THERAPY 1/> REGIONS: CPT

## 2017-05-09 PROCEDURE — 74011636637 HC RX REV CODE- 636/637: Performed by: INTERNAL MEDICINE

## 2017-05-09 PROCEDURE — 82962 GLUCOSE BLOOD TEST: CPT

## 2017-05-09 PROCEDURE — 80048 BASIC METABOLIC PNL TOTAL CA: CPT | Performed by: INTERNAL MEDICINE

## 2017-05-09 PROCEDURE — 97165 OT EVAL LOW COMPLEX 30 MIN: CPT

## 2017-05-09 PROCEDURE — 84443 ASSAY THYROID STIM HORMONE: CPT | Performed by: INTERNAL MEDICINE

## 2017-05-09 PROCEDURE — 94640 AIRWAY INHALATION TREATMENT: CPT

## 2017-05-09 PROCEDURE — 84439 ASSAY OF FREE THYROXINE: CPT | Performed by: INTERNAL MEDICINE

## 2017-05-09 PROCEDURE — 83735 ASSAY OF MAGNESIUM: CPT | Performed by: INTERNAL MEDICINE

## 2017-05-09 RX ORDER — LANOLIN ALCOHOL/MO/W.PET/CERES
800 CREAM (GRAM) TOPICAL EVERY 4 HOURS
Status: COMPLETED | OUTPATIENT
Start: 2017-05-09 | End: 2017-05-09

## 2017-05-09 RX ORDER — DICLOFENAC SODIUM 10 MG/G
2 GEL TOPICAL
Status: DISCONTINUED | OUTPATIENT
Start: 2017-05-09 | End: 2017-05-10 | Stop reason: HOSPADM

## 2017-05-09 RX ORDER — MAGNESIUM SULFATE HEPTAHYDRATE 40 MG/ML
2 INJECTION, SOLUTION INTRAVENOUS
Status: DISCONTINUED | OUTPATIENT
Start: 2017-05-09 | End: 2017-05-09

## 2017-05-09 RX ORDER — EPLERENONE 25 MG/1
25 TABLET, FILM COATED ORAL DAILY
Status: DISCONTINUED | OUTPATIENT
Start: 2017-05-12 | End: 2017-05-10 | Stop reason: HOSPADM

## 2017-05-09 RX ADMIN — SERTRALINE HYDROCHLORIDE 50 MG: 50 TABLET ORAL at 08:12

## 2017-05-09 RX ADMIN — STANDARDIZED SENNA CONCENTRATE AND DOCUSATE SODIUM 1 TABLET: 8.6; 5 TABLET, FILM COATED ORAL at 08:13

## 2017-05-09 RX ADMIN — INSULIN GLARGINE 65 UNITS: 100 INJECTION, SOLUTION SUBCUTANEOUS at 21:56

## 2017-05-09 RX ADMIN — DIVALPROEX SODIUM 250 MG: 250 TABLET, DELAYED RELEASE ORAL at 21:50

## 2017-05-09 RX ADMIN — ROSUVASTATIN CALCIUM 5 MG: 5 TABLET ORAL at 21:50

## 2017-05-09 RX ADMIN — CLINDAMYCIN HYDROCHLORIDE 300 MG: 150 CAPSULE ORAL at 17:24

## 2017-05-09 RX ADMIN — KETOROLAC TROMETHAMINE 15 MG: 15 INJECTION, SOLUTION INTRAMUSCULAR; INTRAVENOUS at 08:13

## 2017-05-09 RX ADMIN — TRAZODONE HYDROCHLORIDE 100 MG: 100 TABLET ORAL at 21:50

## 2017-05-09 RX ADMIN — HEPARIN SODIUM 5000 UNITS: 5000 INJECTION, SOLUTION INTRAVENOUS; SUBCUTANEOUS at 03:09

## 2017-05-09 RX ADMIN — HEPARIN SODIUM 5000 UNITS: 5000 INJECTION, SOLUTION INTRAVENOUS; SUBCUTANEOUS at 18:21

## 2017-05-09 RX ADMIN — CLINDAMYCIN HYDROCHLORIDE 300 MG: 150 CAPSULE ORAL at 06:23

## 2017-05-09 RX ADMIN — CLINDAMYCIN HYDROCHLORIDE 300 MG: 150 CAPSULE ORAL at 23:14

## 2017-05-09 RX ADMIN — GABAPENTIN 300 MG: 300 CAPSULE ORAL at 15:57

## 2017-05-09 RX ADMIN — Medication 10 ML: at 15:57

## 2017-05-09 RX ADMIN — EPLERENONE 25 MG: 25 TABLET, FILM COATED ORAL at 08:12

## 2017-05-09 RX ADMIN — HYDROCODONE BITARTRATE AND ACETAMINOPHEN 1 TABLET: 5; 325 TABLET ORAL at 18:21

## 2017-05-09 RX ADMIN — FAMOTIDINE 20 MG: 20 TABLET ORAL at 08:12

## 2017-05-09 RX ADMIN — FAMOTIDINE 20 MG: 20 TABLET ORAL at 17:24

## 2017-05-09 RX ADMIN — LEVOTHYROXINE SODIUM 100 MCG: 100 TABLET ORAL at 06:30

## 2017-05-09 RX ADMIN — Medication 800 MG: at 15:57

## 2017-05-09 RX ADMIN — INSULIN LISPRO 3 UNITS: 100 INJECTION, SOLUTION INTRAVENOUS; SUBCUTANEOUS at 21:57

## 2017-05-09 RX ADMIN — HYDROCODONE BITARTRATE AND ACETAMINOPHEN 1 TABLET: 5; 325 TABLET ORAL at 00:00

## 2017-05-09 RX ADMIN — KETOROLAC TROMETHAMINE 15 MG: 15 INJECTION, SOLUTION INTRAMUSCULAR; INTRAVENOUS at 03:09

## 2017-05-09 RX ADMIN — INSULIN LISPRO 3 UNITS: 100 INJECTION, SOLUTION INTRAVENOUS; SUBCUTANEOUS at 08:18

## 2017-05-09 RX ADMIN — Medication 800 MG: at 19:58

## 2017-05-09 RX ADMIN — GABAPENTIN 300 MG: 300 CAPSULE ORAL at 08:12

## 2017-05-09 RX ADMIN — ASPIRIN 81 MG: 81 TABLET, COATED ORAL at 08:13

## 2017-05-09 RX ADMIN — HEPARIN SODIUM 5000 UNITS: 5000 INJECTION, SOLUTION INTRAVENOUS; SUBCUTANEOUS at 11:23

## 2017-05-09 RX ADMIN — Medication 10 ML: at 06:26

## 2017-05-09 RX ADMIN — CLINDAMYCIN HYDROCHLORIDE 300 MG: 150 CAPSULE ORAL at 11:23

## 2017-05-09 RX ADMIN — GABAPENTIN 300 MG: 300 CAPSULE ORAL at 21:50

## 2017-05-09 RX ADMIN — Medication 10 ML: at 21:51

## 2017-05-09 RX ADMIN — HYDROCODONE BITARTRATE AND ACETAMINOPHEN 1 TABLET: 5; 325 TABLET ORAL at 06:23

## 2017-05-09 RX ADMIN — INSULIN LISPRO 3 UNITS: 100 INJECTION, SOLUTION INTRAVENOUS; SUBCUTANEOUS at 17:24

## 2017-05-09 RX ADMIN — HYDROCODONE BITARTRATE AND ACETAMINOPHEN 1 TABLET: 5; 325 TABLET ORAL at 23:14

## 2017-05-09 RX ADMIN — ARIPIPRAZOLE 10 MG: 10 TABLET ORAL at 08:12

## 2017-05-09 NOTE — PROGRESS NOTES
Problem: Mobility Impaired (Adult and Pediatric)  Goal: *Acute Goals and Plan of Care (Insert Text)  Physical Therapy Goals  Initiated 5/8/2017 and to be accomplished within 7 day(s)  1. Patient will move from supine <> sit with Mod-Min A in prep for out of bed activity and change of position. 2. Patient will perform sit<> stand with Mod-Min A with rolling walker in prep for transfers/ambulation. 3. Patient will transfer from bed <> chair with Mod A with rolling walker for time up in chair for completion of ADL activity. 4. Patient will ambulate 150 feet with LRAD for increase functional mobility. 5. Patient will ascend/descend 3-5 stairs with handrail(s) with Mod A-Min A for home re-entry as needed. 1338 - Pt eating lunch at this time. Will f/u later in day. 12 - Pt receiving enema at this time, will f/u at later date.

## 2017-05-09 NOTE — PROGRESS NOTES
[facility] Hospitalist Group  Progress Note    Patient: Vandana Sinha Age: 62 y.o. : 1955 MR#: [id number] SSN: [id number]  Date/Time: 2017     Subjective:     Feeling better. No chest or abdominal pain. No nausea or vomiting. Back pain and joint enema present. Objective:     VS:   Visit Vitals    /75 (BP 1 Location: Left arm, BP Patient Position: At rest)    Pulse 65    Temp 97.7 °F (36.5 °C)    Resp 18    Ht 5' 5\" (1.651 m)    Wt 95.3 kg (210 lb)    LMP 2012    SpO2 100%    BMI 34.95 kg/m2      Tmax/24hrs: Temp (24hrs), Av °F (36.7 °C), Min:97.6 °F (36.4 °C), Max:98.5 °F (36.9 °C)  IOBRIEF    Intake/Output Summary (Last 24 hours) at 17 1428  Last data filed at 17 0953   Gross per 24 hour   Intake              640 ml   Output                0 ml   Net              640 ml       General:  Alert, cooperative, no acute distress   Pulmonary:  CTA Bilaterally. No Wheezing/Rhonchi/Rales. Cardiovascular: Regular rate and Rhythm. GI:  Soft, Non distended, Non tender. + Bowel sounds. Extremities: no pedal edema. Diffuse swelling of elbows and ankles  Neurologic: follows commands     Assessment:     1. SIRS without sepsis, due to right forearm/elbow cellulitis. 2. H/O IVDA related abscess on left hand. 3. DM2 with A1c of 10.8  4. Chronic hypoxic respiratory failure [on 2 lpm] and COPD   5. Hypothyroidism  6. Polyarticular pain and stiffness  7. Hypokalemia   8. 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 9. HTN. 10. H/o Hep C   11. Constipation     PLAN:    Ortho input noted  Cont antibiotic [po clindamycin for 3 days] per ID  Input from dr. Bailee Ruiz [rheumatologist] noted - Voltaren topically   PT/OT.   Replete magnesium  ARU vs SNF placement    Case discussed with:  [x]Patient  [x]Family  []Nursing  []Case Management  DVT Prophylaxis:  []Lovenox  [x]Hep SQ  []SCDs  []Coumadin   []On Heparin gtt    Labs:    Recent Results (from the past 24 hour(s))   HEPATITIS PANEL, ACUTE    Collection Time: 05/08/17  3:35 PM   Result Value Ref Range    Hepatitis A, IgM NEGATIVE  NEG      __        ---------------------------------------------------    Hepatitis B surface Ag <0.10 <1.00 Index    Hep B surface Ag Interp. NEGATIVE  NEG      __        ---------------------------------------------------    Hepatitis B core, IgM NEGATIVE  NEG      __        ---------------------------------------------------    Hepatitis C virus Ab >11.00 (H) <0.80 Index    Hep C  virus Ab Interp.  POSITIVE (A) NEG      Hep C  virus Ab comment         RHEUMATOID FACTOR, QL    Collection Time: 05/08/17  3:35 PM   Result Value Ref Range    RA Latex, Ql. NEGATIVE  NEG     ANTINUCLEAR ANTIBODIES, IFA    Collection Time: 05/08/17  3:35 PM   Result Value Ref Range    Antinuclear Abs, IFA NEGATIVE      GLUCOSE, POC    Collection Time: 05/08/17  5:01 PM   Result Value Ref Range    Glucose (POC) 142 (H) 70 - 110 mg/dL   C REACTIVE PROTEIN, QT    Collection Time: 05/08/17  9:54 PM   Result Value Ref Range    C-Reactive protein 9.8 (H) 0 - 0.3 mg/dL   SED RATE (ESR)    Collection Time: 05/08/17  9:54 PM   Result Value Ref Range    Sed rate, automated 117 (H) 0 - 30 mm/hr   GLUCOSE, POC    Collection Time: 05/08/17  9:54 PM   Result Value Ref Range    Glucose (POC) 186 (H) 70 - 110 mg/dL   URIC ACID    Collection Time: 05/09/17  3:25 AM   Result Value Ref Range    Uric acid 6.5 2.6 - 7.2 MG/DL   CBC WITH AUTOMATED DIFF    Collection Time: 05/09/17  3:25 AM   Result Value Ref Range    WBC 8.1 4.6 - 13.2 K/uL    RBC 3.95 (L) 4.20 - 5.30 M/uL    HGB 10.3 (L) 12.0 - 16.0 g/dL    HCT 31.7 (L) 35.0 - 45.0 %    MCV 80.3 74.0 - 97.0 FL    MCH 26.1 24.0 - 34.0 PG    MCHC 32.5 31.0 - 37.0 g/dL    RDW 14.0 11.6 - 14.5 %    PLATELET 756 718 - 002 K/uL    MPV 9.6 9.2 - 11.8 FL    NEUTROPHILS 59 42 - 75 %    LYMPHOCYTES 26 20 - 51 %    MONOCYTES 10 (H) 2 - 9 %    EOSINOPHILS 5 0 - 5 %    BASOPHILS 0 0 - 3 %    ABS. NEUTROPHILS 4.8 1.8 - 8.0 K/UL    ABS. LYMPHOCYTES 2.1 0.8 - 3.5 K/UL    ABS. MONOCYTES 0.8 0 - 1.0 K/UL    ABS. EOSINOPHILS 0.4 0.0 - 0.4 K/UL    ABS.  BASOPHILS 0.0 0.0 - 0.06 K/UL    DF MANUAL      PLATELET COMMENTS ADEQUATE PLATELETS      RBC COMMENTS POLYCHROMASIA  1+       METABOLIC PANEL, BASIC    Collection Time: 05/09/17  3:25 AM   Result Value Ref Range    Sodium 142 136 - 145 mmol/L    Potassium 4.5 3.5 - 5.5 mmol/L    Chloride 106 100 - 108 mmol/L    CO2 28 21 - 32 mmol/L    Anion gap 8 3.0 - 18 mmol/L    Glucose 102 (H) 74 - 99 mg/dL    BUN 18 7.0 - 18 MG/DL    Creatinine 1.31 (H) 0.6 - 1.3 MG/DL    BUN/Creatinine ratio 14 12 - 20      GFR est AA 50 (L) >60 ml/min/1.73m2    GFR est non-AA 41 (L) >60 ml/min/1.73m2    Calcium 9.1 8.5 - 10.1 MG/DL   MAGNESIUM    Collection Time: 05/09/17  3:25 AM   Result Value Ref Range    Magnesium 1.6 1.6 - 2.6 mg/dL   TSH 3RD GENERATION    Collection Time: 05/09/17  3:25 AM   Result Value Ref Range    TSH 3.49 0.36 - 3.74 uIU/mL   T4, FREE    Collection Time: 05/09/17  3:25 AM   Result Value Ref Range    T4, Free 1.2 0.7 - 1.5 NG/DL   GLUCOSE, POC    Collection Time: 05/09/17  8:17 AM   Result Value Ref Range    Glucose (POC) 164 (H) 70 - 110 mg/dL   GLUCOSE, POC    Collection Time: 05/09/17 11:22 AM   Result Value Ref Range    Glucose (POC) 143 (H) 70 - 110 mg/dL       Signed By: Shruti Tipton MD     May 9, 2017

## 2017-05-09 NOTE — INTERDISCIPLINARY ROUNDS
IDR/SLIDR Summary          Patient: Sherley White MRN: [id number]    Age: 62 y.o. YOB: 1955 Room/Bed: [facility]   Admit Diagnosis: SIRS (systemic inflammatory response syndrome) (HCC)  Principal Diagnosis: <principal problem not specified>   Goals: hemodynamic stability  Readmission: YES  Quality Measure: SEPSIS  VTE Prophylaxis: Chemical  Influenza Vaccine screening completed? YES  Pneumococcal Vaccine screening completed? YES  Mobility needs: Yes   Nutrition plan:Yes  Consults:P.T, O.T. and Case Management    Financial concerns:No  Escalated to CM? YES  RRAT Score: 24   Interventions:Home Health  Testing due for pt today?  YES  LOS: 7 days Expected length of stay 10 days  Discharge plan: SNF   PCP: [address] MD  Transportation needs: Yes    Days before discharge:one day until discharge   Discharge disposition: Home    Signed:     Justine Power RN  5/9/2017  4:26 AM

## 2017-05-09 NOTE — CDMP QUERY
Both SIRS and sepsis have been documented. Please clarify. =>SIRS with sepsis  =>SIRS without sepsis  =>Other Explanation of clinical findings  =>Unable to Determine (no explanation of clinical findings)    Sepsis indicators include:   Documented Source of suspected or possible infection in addition to 2 of more of the following:  Temperature >38C or <36C   Heart Rate >90/min   Respiratory Rate >20/min or PaCO2 <32 mm Hg   WBC >12,000/mm3 or <4000/mm3 or >10% immature bands     The medical record reflects the following:  Risk:  --admitted with cellulitis, bursitis    Clinical Indicators:  --5/2/2017 07:40: WBC: 10.1  --5/2/2017 11:27: WBC: 12.2  --5/6/2017 04:33: WBC: 8.0  --5/9/2017 03:25: WBC: 8.1  --5/1 vitals: T - 38.2   HR - 107, 106, 100, 101, 109, 108   --5/2 vitals: T - 38.8, 37.2, 37.4, 38.3, 39.2  HR - 113, 111, 114, 109, 103, 115  --5/3 vitals: T - 38.5, 39.4, 37.7, 38.1, 37.4   HR - 114, 111, 101  --5/4 vitals: T - 38.5, 38.0, 38.2, 37.3, 38.4   HR - 104, 98, 96, 99, 100, 106  --5/5 vitals: T - 37.3, 38.5, 37.5, 36.8   HR - 93, 100    Treatment:   --receiving Cleocin  --given Rocephin, Levaquin, Vancomycin    Please clarify and document your clinical opinion in the progress notes and discharge summary including the definitive and/or presumptive diagnosis, (suspected or probable), related to the above clinical findings. Please include clinical findings supporting your diagnosis. If you DECLINE this query or would like to communicate with [facility], please utilize the \"MyStream message box\" at the TOP of the Progress Note on the right.       Thank you,  Isaac Salgado [address] [place] [address]

## 2017-05-09 NOTE — PROGRESS NOTES
Problem: Self Care Deficits Care Plan (Adult)  Goal: *Acute Goals and Plan of Care (Insert Text)  Occupational Therapy Goals  Initiated 5/9/2017 within 7 day(s). 1. Patient will perform self-feeding with modified independence   2. Patient will perform grooming with modified independence. 3. Patient will demonstrate no edema in hands in preparation for her efficient participation in her self care routine  4. Patient will demonstrate RUE and left shoulder and hand AROM WFL for her efficient completion of her self care routine  OCCUPATIONAL THERAPY EVALUATION     Patient: Shanna Merchant (58 y.o. female)  Date: 5/9/2017  Primary Diagnosis: SIRS (systemic inflammatory response syndrome) (HCC)  Precautions: fall; cast removed from left elbow and in room; hold on therapy AROM pending further activity clarification        ASSESSMENT :  Based on the objective data described below, the patient presents with 2+ pitting edema in digits of both hands. Following soft tissue mobilization and AROM, this is resolved and her digit flexion improved from 80% to full. Following training, she increased to independent with her bilateral hand home program (positioning and AROM). Following treatment and with training, she increased to modified independent self feeding (simulated using utensils and drinking from a cup and a straw. She is pleased with her progress and she reports she will follow through. Patient will benefit from skilled intervention to address the above impairments.   Patients rehabilitation potential is considered to be Good  Factors which may influence rehabilitation potential include:   [X]             None noted  [ ]             Mental ability/status  [ ]             Medical condition  [ ]             Home/family situation and support systems  [ ]             Safety awareness  [ ]             Pain tolerance/management  [ ]             Other:        PLAN :  Recommendations and Planned Interventions:  [X] Self Care Training                  [X]        Therapeutic Activities  [ ]               Functional Mobility Training    [ ]        Cognitive Retraining  [X]               Therapeutic Exercises           [ ]        Endurance Activities  [ ]               Balance Training                   [ ]        Neuromuscular Re-Education  [ ]               Visual/Perceptual Training     [ ]   Home Safety Training  [X]               Patient Education                 [X]        Family Training/Education  [X]               Other (comment):soft tissue mobilization, edema management  Frequency/Duration: Patient will be followed by occupational therapy 1-2 times per day/4-7 days per week to address goals. Discharge Recommendations: Rehab  Further Equipment Recommendations for Discharge: N/A       PATIENT COMPLEXITY      Eval Complexity: History: LOW Complexity : Brief history review ; Examination: LOW Complexity : 1-3 performance deficits relating to physical, cognitive , or psychosocial skils that result in activity limitations and / or participation restrictions ; Decision Making:LOW Complexity : No comorbidities that affect functional and no verbal or physical assistance needed to complete eval tasks  Assessment: LOW Complexity       G-CODES:      Self Care  Current  CL= 60-79%   Goal  CI= 1-19%. The severity rating is based on the Level of Assistance required for Functional Mobility and ADLs. SUBJECTIVE:   Patient stated My hands feel so much better and they are moving better.       OBJECTIVE DATA SUMMARY:       Past Medical History:   Diagnosis Date    Anxiety      Asthma      Back injury 1986     jumped out of second story window    Behavioral change      Bilateral knee pain      Bipolar disorder (HCC)      Chronic airway obstruction, not elsewhere classified       SOB, on paula O2 Recent admission with psychosis    Chronic back pain      Chronic diastolic heart failure (HCC)       Stable on diuretics    Chronic kidney disease       stage III    Congestive heart failure (HCC)      COPD (chronic obstructive pulmonary disease) ([facility] [address] 10/31/2012    Cramps, muscle, general      Depression      Diabetes mellitus      Difficulty speaking      Difficulty walking      Difficulty writing      DJD (degenerative joint disease) of knee       bilateral, mild    Edema       The edema involves both lower extremities. The episodes last for 1 week. The patient describes this as worsening. Symptoms are exacerbated by prolonged sitting. Symptoms are relieved by leg elevation and diuretics. NO CHANGE    Essential hypertension, benign       Better controlled    Falls frequently      Fatigue      Generalized stiffness      Headache      Heartburn      Hiatal hernia      History of hepatitis C       treated    Hoarseness of voice      Hypothyroidism      Lung disease      Memory difficulty      Morbid obesity (HCC)      Multiple joint pain      Nausea      Numbness and tingling       arms and legs, related to diabetic neuropathy    Osteoarthritis of left knee      Other and unspecified hyperlipidemia       LDL reportedly very high/started on crestor recently by diabetic doctor    Oxygen dependent      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      Sarcoidosis ([facility] [address]      Sickle cell trait ([facility] [address]      Sinusitis      SOB (shortness of breath)      STD (female)       herpes    Swelling of body region       legs and feet    Tinnitus      Venous insufficiency      Vertigo      Weakness generalized      Wears glasses      Weight gain       Past Surgical History:   Procedure Laterality Date    HX BACK SURGERY       HX  SECTION        HX TUBAL LIGATION         Prior Level of Function/Home Situation:   Home Situation  Home Environment: Apartment  # Steps to Enter: 5  Rails to Enter: Yes  Hand Rails : Bilateral  One/Two Story Residence: One story  Living Alone: Yes  Support Systems: Other (comments) (Nurse Aid)  Patient Expects to be Discharged to[de-identified] Private residence  Current DME Used/Available at Home: Walker, rolling  Tub or Shower Type: Tub/Shower combination (Step in tub)  [X]  Right hand dominant          [ ]  Left hand dominant  Cognitive/Behavioral Status:  Neurologic State: Alert  Orientation Level: Oriented X4  Skin: no skin integrity issues noted during OT evaluation  Edema: as noted above  Vision/Perceptual:       Tracking is [facility]   Coordination:   mod impaired BUE secondary to edema and anticipation of pain   Balance:   she declined sitting or rolling secondary to preferring to address her UB pain and edema  Strength:  Not formally assessed in her UB secondary to her pain and anticipation of pain; shoulders 3-/5, right elbow 3 to 3+/5, left elbow noted to be moving upon entry [facility] for an observed strength of 3/5, left hand strength 3-/5, right hand strength 3/5 (improved to 3+/5 following edema reduction   Tone & Sensation:  BUE's:WFL   Range of Motion:  RUE: AROM is WFL, LUE: shoulder 0 - 90', elbow noted to be actively moving upon arrival 0 - 90', digit flexion 80% (improved to 95% following treatment)   Functional Mobility and Transfers for ADLs:  Bed Mobility:   as noted above   ADL Assessment:   self feeding: total assist (progress as noted above)  Grooming: total assist  UB bathing/dressing: Max to mod assist  LB bathing/dressing: total assist (she cannot reach past her knees)  ADL Intervention:   as noted above  Therapeutic Exercise:  As noted above     Pain:  RUE: 8/10 pain in hand prior to OT session,; 1/10 pain in hand following OT session  LUE: 5/10 pain prior to OT session and 5/10 pain following OT session  Activity Tolerance:   No SOB noted  Please refer to the flowsheet for vital signs taken during this treatment.   After treatment:   [ ] Patient left in no apparent distress sitting up in chair  [X] Patient left in no apparent distress in bed  [X] Call bell left within reach  [ ] Nursing notified  [ ] Caregiver present  [ ] Bed alarm activated      COMMUNICATION/EDUCATION:   [ ] Home safety education was provided and the patient/caregiver indicated understanding. [ ] Patient/family have participated as able in goal setting and plan of care. [X] Patient/family agree to work toward stated goals and plan of care. [ ] Patient understands intent and goals of therapy, but is neutral about his/her participation. [ ] Patient is unable to participate in goal setting and plan of care.      Thank you for this referral.  Milagros Zhong, OTR/L  Time Calculation: 39 mins

## 2017-05-09 NOTE — CONSULTS
[address]    Name:  Rudy Tamez  MR#:  [id number]  :  1955  Account #:  [de-identified]  Date of Adm:  2017  Date of Consultation:  2017      HISTORY: This is a 21-year-old  female who was  admitted by way of the emergency room to [facility] on  2007. The patient has very complex history including that of type  2 diabetes, COPD, hypertension, hepatitis C, IV drug abuse, bipolar  disorder, and chronic diastolic congestive heart failure. The patient was  admitted for increase in the left hand and foot pain. She was found to  have left foot and left hand abscesses and cellulitis. She was treated  with IV medication and was discharged to home on p.o. levofloxacin for  7 days. She is currently in the hospital after the above episode due to a  fall injuring both wrists. She is right-handed and has difficulty moving,  even feeding herself with her right hand. She had a course of  vancomycin and is currently on clindamycin 300 mg every 6 hours. I  was asked to see the patient because she is having pain in a number  of peripheral joints and there has been some abnormal  findings on her x-  rays as well. The patient says that she has been having pain and  swelling in many of her peripheral joints for months and the pain level is  usually about 8/10, for which she  self medicates with Tylenol. No  complaints at this time of photosensitivity, malar rash or ulcers,  abdominal pain, or bloody diarrhea. PAST MEDICAL HISTORY: Significant for:    ALLERGIES. 1. MOXIFLOXACIN. 2. PENICILLIN. 3. SULFA. ALL MANIFESTED BY RASHES.     PAST MEDICAL HISTORY: Significant for bipolar disorder, chronic  airway obstruction, i.e., COPD, chronic kidney disease stage 3, history  of congestive heart failure, diabetes mellitus, difficulty with ambulating  and difficulty writing, history of degenerative joint disease, frequent  falls, memory problems, history of sarcoidosis, history of sickle cell  trait, history of sinusitis, history of venous insufficiency, history of  vertigo, history of generalized weakness. History of weight gain. FAMILY AND SOCIAL HISTORY: Remarkable for mother with  diabetes, hypertension, heart disease. A father with diabetes, CVA,  heart disease. A sister with headaches and depression. MEDICATIONS PRIOR TO ADMISSION: Included:  1. Oxycodone i.e. Percocet 5/325 mg every 8 hours p.r.n. pain. 2. Anoro Ellipta 62.5-25 inhalation 1 puff inhalation daily. 3. Ventolin 2 puffs every 4 hours. 4. Depakote 250 mg p.o. nightly. 5. Lasix 20 mg p.o. daily. 6. Zaroxolyn 5 mg p.o. every Monday, Wednesday and Sunday. 7. Klor-Con 20 mEq 1 packet by mouth daily. 8. Lantus insulin 60 units subcutaneous nightly. 9. Abilify 5 mg p.o. daily. 10. Proventil 1 nebulizer every 4 hours. 11. Inspra 25 mg daily. 12. Neurontin 300 mg every 8 hours. 13. Tylenol 325 mg p.o. every 6 hours. 14. Trazodone 100 mg p.o. nightly. 15. Crestor 5 mg p.o. nightly. 16. Metanx 2.5 - 2.25 mg p.o. daily. 17. Aspirin 81 mg daily. 18. Synthroid 100 mcg p.o. daily before breakfast.  19. Zoloft 100 mg p.o. daily. 20. Oxygen air delivery system by nasal route, 2 liters p.r.n.  21. She also takes vitamin D 50,000 units by mouth daily. REVIEW OF SYSTEMS  No complaints of photosensitivity, malar rash or ulcers, abdominal  pain or bloody diarrhea. No complaints of headaches at this time, chills  or fever. Her main problem is that of difficulty moving her extremities,  difficulty ambulating, difficulty walking and with pain in the above joints. She also reports that she fell at home and braced herself, essentially  both hands were involved and she got injuries to both elbows and both  wrists and hands during the fall. No complaints of recent rashes. No  complaints of dysuria. PHYSICAL EXAMINATION  GENERAL: Alert, in no acute distress.   VITAL SIGNS: Temperature 98, blood pressure 140/79, respiratory  rate 18, pulse 72. EYES: PERRLA, EOMI. NOSE AND THROAT: Appear clear. NECK: Supple. LUNGS: Clear to auscultation. HEART: Regular rate and rhythm. I could appreciate no gallops or  murmurs. ABDOMEN: Soft, nontender. Bowel sounds are active. /RECTAL: Deferred. MUSCULOSKELETAL: There is palpable tenderness of the shoulders. Abduction only to about 45 degrees, internal and external rotation less  than 10. Both elbows are tender to palpation. She has swollen wrist  and hands bilaterally with excruciating palpable tenderness. Hand   markedly reduced in both hands. She cannot feed herself with the right  hand and she is right-handed. Both knees are tender to palpation. Ankles are also tender to palpation and MTP joint as well. Gait not  evaluable. Muscle strength is estimated to be about 4/5 for shoulder  and pelvic girdles. NEURO: Cranial nerves 2-12 grossly intact. IMAGING STUDIES: X-rays 05/05/2017 of the left elbow shows  possible fracture of the coronoid process of ulnar and left elbow  effusion. LABORATORY DATA:  Labs from 05/02/2017 revealed creatinine of  1.07. Sodium 138, potassium 3.5. Labs from 05/08/2017 reveals a  serum glucose 252, creatinine 1.84, and her GFR has decreased from  greater than 60 at the time of admission to 34 on 05/08/2017. OVERALL IMPRESSION: Is that of inflammatory polyarthritis. Some features may be attributed to her underlying infection. However,  one must consider and rule out rheumatoid arthritis versus gouty  arthritis versus septic arthritis versus inflammatory osteoarthritis and  nonspecific connective tissue disease as well. In view of her history of  sarcoidosis, sarcoid arthropathy must also be included in the  differential diagnosis. RECOMMENDATIONS/PLANS; X-ray of the right wrist and hand, anti-  CCP, C-reactive protein, sedimentation rate.   I feel that in view of her  level of discomfort at this time that Toradol 50 mg IV every 6 hours x3  dosages is indicated and that the benefit for a weighted wrist at this  time. Thank you very much for allowing me an opportunity to see this  interesting lady. More than 1 hour was spent seeing this lady.         Ramin Arora MD DR / Kelly Merlin  D:  05/08/2017   20:05  T:  05/09/2017   07:45  Job #:  [id number]

## 2017-05-09 NOTE — ROUTINE PROCESS
Bedside and Verbal shift change report given to [address] [place] [address] (oncoming nurse) by Alfredo Manriquez RN (offgoing nurse). Report included the following information SBAR, Kardex, MAR and Recent Results. SITUATION:    Code Status: Full Code   Reason for Admission: SIRS (systemic inflammatory response syndrome) ([facility]    [facility] day: 7   Problem List:       Hospital Problems  Date Reviewed: 4/17/2017          Codes Class Noted POA    Cellulitis of right elbow ICD-10-CM: S12.310  ICD-9-CM: 682.3  5/4/2017 Yes        Olecranon bursitis of right elbow ICD-10-CM: M70.21  ICD-9-CM: 726.33  5/4/2017 Yes        Contusion of elbow ICD-10-CM: S50.00XA  ICD-9-CM: 923.11  5/4/2017 Yes        SIRS (systemic inflammatory response syndrome) ([facility] ICD-10-CM: R65.10  ICD-9-CM: 995.90  5/2/2017 Unknown              BACKGROUND:    Past Medical History:   Past Medical History:   Diagnosis Date    Anxiety     Asthma     Back injury 1986    jumped out of second story window    Behavioral change     Bilateral knee pain     Bipolar disorder ([facility]     Chronic airway obstruction, not elsewhere classified     SOB, on paula O2 Recent admission with psychosis    Chronic back pain     Chronic diastolic heart failure (HCC)     Stable on diuretics    Chronic kidney disease     stage III    Congestive heart failure (HCC)     COPD (chronic obstructive pulmonary disease) ([facility] 10/31/2012    Cramps, muscle, general     Depression     Diabetes mellitus     Difficulty speaking     Difficulty walking     Difficulty writing     DJD (degenerative joint disease) of knee     bilateral, mild    Edema     The edema involves both lower extremities. The episodes last for 1 week. The patient describes this as worsening. Symptoms are exacerbated by prolonged sitting. Symptoms are relieved by leg elevation and diuretics. NO CHANGE    Essential hypertension, benign     Better controlled    Falls frequently     Fatigue     Generalized stiffness     Headache     Heartburn     Hiatal hernia     History of hepatitis C     treated    Hoarseness of voice     Hypothyroidism     Lung disease     Memory difficulty     Morbid obesity (HCC)     Multiple joint pain     Nausea     Numbness and tingling     arms and legs, related to diabetic neuropathy    Osteoarthritis of left knee     Other and unspecified hyperlipidemia     LDL reportedly very high/started on crestor recently by diabetic doctor    Oxygen dependent     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     Sarcoidosis ([facility]     Sickle cell trait ([facility]     Sinusitis     SOB (shortness of breath)     STD (female)     herpes    Swelling of body region     legs and feet    Tinnitus     Venous insufficiency     Vertigo     Weakness generalized     Wears glasses     Weight gain          Patient taking anticoagulants yes     ASSESSMENT:    Changes in Assessment Throughout Shift: none     Patient has Central Line: no Reasons if yes: none   Patient has Renae Cath: no Reasons if yes: n/a      Last Vitals:     Vitals:    05/08/17 1252 05/08/17 1639 05/08/17 2000 05/09/17 0000   BP: 106/71 141/79 127/82 148/80   Pulse: 72 72 69 65   Resp: 18 18 18 19   Temp: 97.5 °F (36.4 °C) 98 °F (36.7 °C) 97.6 °F (36.4 °C) 98.4 °F (36.9 °C)   SpO2: 97% 95% 100% 100%   Weight:       Height:       LMP: 11/25/2012        IV and DRAINS (will only show if present)   [REMOVED] Peripheral IV 05/02/17 Right Wrist-Site Assessment: Clean, dry, & intact  Peripheral IV 05/05/17 Right Forearm-Site Assessment: Clean, dry, & intact  [REMOVED] Peripheral IV 05/01/17 Right Forearm-Site Assessment: Clean, dry, & intact  [REMOVED] Peripheral IV 05/01/17 Right Hand-Site Assessment: Clean, dry, & intact     WOUND (if present)   Wound Type: Incision and drainage   Dressing present Dressing Present : No   Wound Concerns/Notes:  Wound mgt.      PAIN    Pain Assessment    Pain Intensity 1: 6 (05/09/17 0030)    Pain Location 1: Arm, Knee    Pain Intervention(s) 1: Medication (see MAR)    Patient Stated Pain Goal: 0  o Interventions for Pain:  Pain mgt.  o Intervention effective: yes  o Time of last intervention: 0600   o Reassessment Completed: yes      Last 3 Weights:  Last 3 Recorded Weights in this Encounter    05/01/17 2213 05/02/17 2241   Weight: 95.3 kg (210 lb) 95.3 kg (210 lb)     Weight change:      INTAKE/OUPUT    Current Shift:      Last three shifts: 05/07 0701 - 05/08 1900  In: 1280 [P.O.:780; I.V.:500]  Out: 1550 [Urine:1550]     LAB RESULTS     Recent Labs      05/06/17   0433   WBC  8.0   HGB  10.2*   HCT  30.7*   PLT  185        No results for input(s): NA, K, GLU, BUN, CREA, CA, MG, INR in the last 72 hours. No lab exists for component: PT, PTT, INREXT, INREXT    RECOMMENDATIONS AND DISCHARGE PLANNING     1. Pending tests/procedures/ Plan of Care or Other Needs: follow up labs     2. Discharge plan for patient and Needs/Barriers: PT OT    3. Estimated Discharge Date: 5/12/17 Posted on Whiteboard in [address] [place] [address] Room: yes      4. The patient's care plan was reviewed with the oncoming nurse. \"HEALS\" SAFETY CHECK      Fall Risk    Total Score: 3    Safety Measures: Safety Measures: Bed/Chair-Wheels locked, Bed in low position, Call light within reach    A safety check occurred in the patient's room between off going nurse and oncoming nurse listed above.     The safety check included the below items  Area Items   H  High Alert Medications - Verify all high alert medication drips (heparin, PCA, etc.)   E  Equipment - Suction is set up for ALL patients (with yanker)  - Red plugs utilized for all equipment (IV pumps, etc.)  - WOWs wiped down at end of shift.  - Room stocked with oxygen, suction, and other unit-specific supplies   A  Alarms - Bed alarm is set for fall risk patients  - Ensure chair alarm is in place and activated if patient is up in a chair   L  Lines - Check IV for any infiltration  - Renae bag is empty if patient has a Renae   - Tubing and IV bags are labeled   S  Safety   - Room is clean, patient is clean, and equipment is clean. - Hallways are clear from equipment besides carts. - Fall bracelet on for fall risk patients  - Ensure room is clear and free of clutter  - Suction is set up for ALL patients (with yanker)  - Hallways are clear from equipment besides carts.    - Isolation precautions followed, supplies available outside room, sign posted     Gabriella Schmitt RN

## 2017-05-09 NOTE — PROGRESS NOTES
Stephen RODRIGUEZ Roots resting at this time. She has no complaints and states she has better motion to her right elbow      Visit Vitals    /76 (BP 1 Location: Right arm, BP Patient Position: At rest)    Pulse 63    Temp 98.5 °F (36.9 °C)    Resp 19    Ht 5' 5\" (1.651 m)    Wt 210 lb (95.3 kg)    SpO2 98%    BMI 34.95 kg/m2       On examination 5/9/2017 , the patient is alert, oriented (name, place, time) and follows commands. she is in no acute distress and her affect and mood are appropriate. Left hand: dressings in place; NT flexor/extensor left hand tendons. No fluctuance. Left dorsum foot: dry eschar in place: no pus/fluctuance  Right arm/elbow: cellulitis resolving nicely. No evidence of septic olecranon bursitis: still with posterior distal humerus skin edema. Right ankle: mild tenderness to distal posterior achilles region. No erythema/fluctuance noted. AROM/PROM non tender at right ankle    NO new labs at this time:     CMP: No results found for: NA, K, CL, CO2, AGAP, GLU, BUN, CREA, GFRAA, GFRNA, CA, MG, PHOS, ALB, TBIL, TP, ALB, GLOB, AGRAT, SGOT, ALT, GPT  CBC: No results found for: WBC, HGB, HGBEXT, HCT, HCTEXT, PLT, PLTEXT, HGBEXT, HCTEXT, PLTEXT       AP  1. H/o MRSA abscess left hand/left foot April 2017: looks good at this point: continue wound care  2. Left elbow contusion: no Fx  3. Right arm cellulitis: improving nicely  4. Right posterior achilles tendonitis: PT  5.  ABX per ID recommendation        Zafar Mayorga PA-C  5/9/2017  8:04 AM

## 2017-05-09 NOTE — PROGRESS NOTES
ARU/IPR REFERRAL CONTACT NOTE  [address] [place] [address] for Physical Rehabilitation    RE: Jaqcuelin Alvarenga    Referral received to review this patient's case for admission to [address] [place] [address] for Physical Rehabilitation. Current status reviewed.  When appropriate, will need PT/OT evaluation/treatment on this patient to complete the pre-admission evaluation.  Will continue to follow. Please be aware if she qualifies for the ARU she will need authorization from [facility]. Thank you for this referral.  Should you have any questions please do not hesitate to call. Sincerely,  James Rodriguez.  [address] [place] [address] Physical Rehabilitation  [phone]

## 2017-05-10 ENCOUNTER — HOSPITAL ENCOUNTER (INPATIENT)
Age: 62
LOS: 21 days | Discharge: HOME HEALTH | DRG: 383 | End: 2017-05-31
Attending: INTERNAL MEDICINE | Admitting: INTERNAL MEDICINE
Payer: MEDICAID

## 2017-05-10 VITALS
TEMPERATURE: 98.9 F | WEIGHT: 210 LBS | SYSTOLIC BLOOD PRESSURE: 144 MMHG | HEIGHT: 65 IN | OXYGEN SATURATION: 95 % | DIASTOLIC BLOOD PRESSURE: 83 MMHG | BODY MASS INDEX: 34.99 KG/M2 | RESPIRATION RATE: 18 BRPM | HEART RATE: 68 BPM

## 2017-05-10 DIAGNOSIS — S50.02XD CONTUSION OF LEFT ELBOW, SUBSEQUENT ENCOUNTER: ICD-10-CM

## 2017-05-10 DIAGNOSIS — E03.9 ACQUIRED HYPOTHYROIDISM: Chronic | ICD-10-CM

## 2017-05-10 DIAGNOSIS — K44.9 GASTROESOPHAGEAL REFLUX DISEASE WITH HIATAL HERNIA: Chronic | ICD-10-CM

## 2017-05-10 DIAGNOSIS — L03.113 CELLULITIS OF RIGHT FOREARM: ICD-10-CM

## 2017-05-10 DIAGNOSIS — Z86.39 HISTORY OF VITAMIN D DEFICIENCY: Chronic | ICD-10-CM

## 2017-05-10 DIAGNOSIS — E79.0 HYPERURICEMIA: Chronic | ICD-10-CM

## 2017-05-10 DIAGNOSIS — N18.30 TYPE 2 DIABETES MELLITUS WITH STAGE 3 CHRONIC KIDNEY DISEASE, WITH LONG-TERM CURRENT USE OF INSULIN (HCC): Chronic | ICD-10-CM

## 2017-05-10 DIAGNOSIS — M76.61 RIGHT ACHILLES TENDINITIS: ICD-10-CM

## 2017-05-10 DIAGNOSIS — E83.42 HYPOMAGNESEMIA: ICD-10-CM

## 2017-05-10 DIAGNOSIS — Z79.4 TYPE 2 DIABETES MELLITUS WITH STAGE 3 CHRONIC KIDNEY DISEASE, WITH LONG-TERM CURRENT USE OF INSULIN (HCC): Chronic | ICD-10-CM

## 2017-05-10 DIAGNOSIS — E11.22 TYPE 2 DIABETES MELLITUS WITH STAGE 3 CHRONIC KIDNEY DISEASE, WITH LONG-TERM CURRENT USE OF INSULIN (HCC): Chronic | ICD-10-CM

## 2017-05-10 DIAGNOSIS — N39.41 URGE URINARY INCONTINENCE: Chronic | ICD-10-CM

## 2017-05-10 DIAGNOSIS — K21.9 GASTROESOPHAGEAL REFLUX DISEASE WITH HIATAL HERNIA: Chronic | ICD-10-CM

## 2017-05-10 DIAGNOSIS — R65.10 SIRS (SYSTEMIC INFLAMMATORY RESPONSE SYNDROME) (HCC): Primary | ICD-10-CM

## 2017-05-10 DIAGNOSIS — M70.21 OLECRANON BURSITIS OF RIGHT ELBOW: ICD-10-CM

## 2017-05-10 PROBLEM — M35.1 MIXED CONNECTIVE TISSUE DISEASE (HCC): Chronic | Status: ACTIVE | Noted: 2017-05-10

## 2017-05-10 PROBLEM — E87.6 HYPOKALEMIA: Status: ACTIVE | Noted: 2017-05-04

## 2017-05-10 PROBLEM — Z74.09 IMPAIRED MOBILITY AND ADLS: Status: ACTIVE | Noted: 2017-05-04

## 2017-05-10 PROBLEM — Z78.9 IMPAIRED MOBILITY AND ADLS: Status: ACTIVE | Noted: 2017-05-04

## 2017-05-10 PROBLEM — F19.90 INTRAVENOUS DRUG USER: Status: ACTIVE | Noted: 2017-05-02

## 2017-05-10 PROBLEM — S50.02XA CONTUSION OF LEFT ELBOW: Status: ACTIVE | Noted: 2017-05-04

## 2017-05-10 LAB
ANION GAP BLD CALC-SCNC: 7 MMOL/L (ref 3–18)
BUN SERPL-MCNC: 19 MG/DL (ref 7–18)
BUN/CREAT SERPL: 14 (ref 12–20)
CALCIUM SERPL-MCNC: 9.5 MG/DL (ref 8.5–10.1)
CCP IGA+IGG SERPL IA-ACNC: 40 UNITS (ref 0–19)
CHLORIDE SERPL-SCNC: 103 MMOL/L (ref 100–108)
CO2 SERPL-SCNC: 28 MMOL/L (ref 21–32)
CREAT SERPL-MCNC: 1.33 MG/DL (ref 0.6–1.3)
ENA RNP AB SER-ACNC: 4.3 AI (ref 0–0.9)
ENA SM AB SER-ACNC: <0.2 AI (ref 0–0.9)
ENA SS-A AB SER-ACNC: <0.2 AI (ref 0–0.9)
ENA SS-B AB SER-ACNC: <0.2 AI (ref 0–0.9)
GLUCOSE BLD STRIP.AUTO-MCNC: 103 MG/DL (ref 70–110)
GLUCOSE BLD STRIP.AUTO-MCNC: 108 MG/DL (ref 70–110)
GLUCOSE BLD STRIP.AUTO-MCNC: 125 MG/DL (ref 70–110)
GLUCOSE BLD STRIP.AUTO-MCNC: 171 MG/DL (ref 70–110)
GLUCOSE SERPL-MCNC: 103 MG/DL (ref 74–99)
MAGNESIUM SERPL-MCNC: 1.6 MG/DL (ref 1.6–2.6)
POTASSIUM SERPL-SCNC: 4.5 MMOL/L (ref 3.5–5.5)
SODIUM SERPL-SCNC: 138 MMOL/L (ref 136–145)

## 2017-05-10 PROCEDURE — 74011250637 HC RX REV CODE- 250/637: Performed by: INTERNAL MEDICINE

## 2017-05-10 PROCEDURE — 74011636637 HC RX REV CODE- 636/637: Performed by: INTERNAL MEDICINE

## 2017-05-10 PROCEDURE — 36415 COLL VENOUS BLD VENIPUNCTURE: CPT | Performed by: INTERNAL MEDICINE

## 2017-05-10 PROCEDURE — 74011250636 HC RX REV CODE- 250/636: Performed by: INTERNAL MEDICINE

## 2017-05-10 PROCEDURE — 97110 THERAPEUTIC EXERCISES: CPT

## 2017-05-10 PROCEDURE — 82962 GLUCOSE BLOOD TEST: CPT

## 2017-05-10 PROCEDURE — 80048 BASIC METABOLIC PNL TOTAL CA: CPT | Performed by: INTERNAL MEDICINE

## 2017-05-10 PROCEDURE — 94640 AIRWAY INHALATION TREATMENT: CPT

## 2017-05-10 PROCEDURE — 83735 ASSAY OF MAGNESIUM: CPT | Performed by: INTERNAL MEDICINE

## 2017-05-10 PROCEDURE — 97530 THERAPEUTIC ACTIVITIES: CPT

## 2017-05-10 PROCEDURE — 65310000000 HC RM PRIVATE REHAB

## 2017-05-10 RX ORDER — TRAZODONE HYDROCHLORIDE 50 MG/1
100 TABLET ORAL
Status: DISCONTINUED | OUTPATIENT
Start: 2017-05-10 | End: 2017-05-31 | Stop reason: HOSPADM

## 2017-05-10 RX ORDER — CLINDAMYCIN HYDROCHLORIDE 150 MG/1
300 CAPSULE ORAL EVERY 6 HOURS
Status: COMPLETED | OUTPATIENT
Start: 2017-05-10 | End: 2017-05-13

## 2017-05-10 RX ORDER — FAMOTIDINE 20 MG/1
20 TABLET, FILM COATED ORAL
Qty: 30 TAB | Refills: 0 | Status: ON HOLD
Start: 2017-05-10 | End: 2017-05-31 | Stop reason: CLARIF

## 2017-05-10 RX ORDER — ACETAMINOPHEN 325 MG/1
650 TABLET ORAL
Status: DISCONTINUED | OUTPATIENT
Start: 2017-05-10 | End: 2017-05-31 | Stop reason: HOSPADM

## 2017-05-10 RX ORDER — ERGOCALCIFEROL 1.25 MG/1
50000 CAPSULE ORAL
Qty: 4 CAP | Refills: 0 | Status: SHIPPED
Start: 2017-05-10 | End: 2017-05-31

## 2017-05-10 RX ORDER — INSULIN LISPRO 100 [IU]/ML
INJECTION, SOLUTION INTRAVENOUS; SUBCUTANEOUS
Qty: 1 VIAL | Refills: 0 | Status: ON HOLD
Start: 2017-05-10 | End: 2017-05-31

## 2017-05-10 RX ORDER — SERTRALINE HYDROCHLORIDE 50 MG/1
50 TABLET, FILM COATED ORAL DAILY
Status: DISCONTINUED | OUTPATIENT
Start: 2017-05-11 | End: 2017-05-31 | Stop reason: HOSPADM

## 2017-05-10 RX ORDER — BISACODYL 5 MG
10 TABLET, DELAYED RELEASE (ENTERIC COATED) ORAL
Status: DISCONTINUED | OUTPATIENT
Start: 2017-05-10 | End: 2017-05-31 | Stop reason: HOSPADM

## 2017-05-10 RX ORDER — DIVALPROEX SODIUM 250 MG/1
250 TABLET, DELAYED RELEASE ORAL
Status: DISCONTINUED | OUTPATIENT
Start: 2017-05-10 | End: 2017-05-31 | Stop reason: HOSPADM

## 2017-05-10 RX ORDER — POTASSIUM CHLORIDE 1.5 G/1.77G
20 POWDER, FOR SOLUTION ORAL
Qty: 30 EACH | Refills: 0 | Status: SHIPPED
Start: 2017-05-10 | End: 2017-05-31

## 2017-05-10 RX ORDER — HEPARIN SODIUM 5000 [USP'U]/ML
5000 INJECTION, SOLUTION INTRAVENOUS; SUBCUTANEOUS EVERY 8 HOURS
Qty: 42 ML | Refills: 0 | Status: ON HOLD
Start: 2017-05-10 | End: 2017-05-31 | Stop reason: CLARIF

## 2017-05-10 RX ORDER — MAGNESIUM SULFATE HEPTAHYDRATE 40 MG/ML
2 INJECTION, SOLUTION INTRAVENOUS
Status: COMPLETED | OUTPATIENT
Start: 2017-05-10 | End: 2017-05-10

## 2017-05-10 RX ORDER — GUAIFENESIN 100 MG/5ML
81 LIQUID (ML) ORAL
Status: DISCONTINUED | OUTPATIENT
Start: 2017-05-11 | End: 2017-05-31 | Stop reason: HOSPADM

## 2017-05-10 RX ORDER — LEVOTHYROXINE SODIUM 100 UG/1
100 TABLET ORAL
Status: DISCONTINUED | OUTPATIENT
Start: 2017-05-11 | End: 2017-05-31 | Stop reason: HOSPADM

## 2017-05-10 RX ORDER — HYDROCODONE BITARTRATE AND ACETAMINOPHEN 5; 325 MG/1; MG/1
1 TABLET ORAL
Status: DISCONTINUED | OUTPATIENT
Start: 2017-05-10 | End: 2017-05-17

## 2017-05-10 RX ORDER — INSULIN GLARGINE 100 [IU]/ML
65 INJECTION, SOLUTION SUBCUTANEOUS
Status: DISCONTINUED | OUTPATIENT
Start: 2017-05-10 | End: 2017-05-17

## 2017-05-10 RX ORDER — DICLOFENAC SODIUM 10 MG/G
2 GEL TOPICAL
Qty: 100 G | Refills: 0 | Status: ON HOLD
Start: 2017-05-10 | End: 2017-05-31 | Stop reason: CLARIF

## 2017-05-10 RX ORDER — FAMOTIDINE 20 MG/1
20 TABLET, FILM COATED ORAL 2 TIMES DAILY
Status: DISCONTINUED | OUTPATIENT
Start: 2017-05-10 | End: 2017-05-15

## 2017-05-10 RX ORDER — INSULIN GLARGINE 100 [IU]/ML
INJECTION, SOLUTION SUBCUTANEOUS
Qty: 1 VIAL | Refills: 0 | Status: ON HOLD
Start: 2017-05-10 | End: 2017-05-31

## 2017-05-10 RX ORDER — ROSUVASTATIN CALCIUM 5 MG/1
5 TABLET, COATED ORAL
Status: DISCONTINUED | OUTPATIENT
Start: 2017-05-10 | End: 2017-05-31 | Stop reason: HOSPADM

## 2017-05-10 RX ORDER — AMOXICILLIN 250 MG
1 CAPSULE ORAL DAILY
Qty: 30 TAB | Refills: 0 | Status: ON HOLD
Start: 2017-05-10 | End: 2017-05-31 | Stop reason: CLARIF

## 2017-05-10 RX ORDER — GABAPENTIN 300 MG/1
300 CAPSULE ORAL EVERY 8 HOURS
Status: DISCONTINUED | OUTPATIENT
Start: 2017-05-10 | End: 2017-05-15

## 2017-05-10 RX ORDER — DOCUSATE SODIUM 100 MG/1
100 CAPSULE, LIQUID FILLED ORAL 2 TIMES DAILY
Status: DISCONTINUED | OUTPATIENT
Start: 2017-05-10 | End: 2017-05-25

## 2017-05-10 RX ORDER — DICLOFENAC SODIUM 10 MG/G
2 GEL TOPICAL 4 TIMES DAILY
Status: DISCONTINUED | OUTPATIENT
Start: 2017-05-10 | End: 2017-05-11

## 2017-05-10 RX ORDER — ARIPIPRAZOLE 10 MG/1
10 TABLET ORAL DAILY
Status: DISCONTINUED | OUTPATIENT
Start: 2017-05-11 | End: 2017-05-31 | Stop reason: HOSPADM

## 2017-05-10 RX ORDER — EPLERENONE 25 MG/1
25 TABLET, FILM COATED ORAL DAILY
Status: DISCONTINUED | OUTPATIENT
Start: 2017-05-14 | End: 2017-05-15

## 2017-05-10 RX ORDER — ALBUTEROL SULFATE 0.83 MG/ML
2.5 SOLUTION RESPIRATORY (INHALATION)
Status: DISCONTINUED | OUTPATIENT
Start: 2017-05-10 | End: 2017-05-31 | Stop reason: HOSPADM

## 2017-05-10 RX ORDER — CLINDAMYCIN HYDROCHLORIDE 300 MG/1
300 CAPSULE ORAL EVERY 6 HOURS
Qty: 12 CAP | Refills: 0 | Status: ON HOLD
Start: 2017-05-10 | End: 2017-05-31 | Stop reason: CLARIF

## 2017-05-10 RX ORDER — INSULIN LISPRO 100 [IU]/ML
INJECTION, SOLUTION INTRAVENOUS; SUBCUTANEOUS
Status: DISCONTINUED | OUTPATIENT
Start: 2017-05-10 | End: 2017-05-31 | Stop reason: HOSPADM

## 2017-05-10 RX ORDER — HEPARIN SODIUM 5000 [USP'U]/ML
5000 INJECTION, SOLUTION INTRAVENOUS; SUBCUTANEOUS EVERY 8 HOURS
Status: DISCONTINUED | OUTPATIENT
Start: 2017-05-10 | End: 2017-05-31 | Stop reason: HOSPADM

## 2017-05-10 RX ORDER — EPLERENONE 25 MG/1
25 TABLET, FILM COATED ORAL DAILY
Qty: 30 TAB | Refills: 0 | Status: SHIPPED
Start: 2017-05-14 | End: 2017-05-31

## 2017-05-10 RX ADMIN — CLINDAMYCIN HYDROCHLORIDE 300 MG: 150 CAPSULE ORAL at 21:02

## 2017-05-10 RX ADMIN — HYDROCODONE BITARTRATE AND ACETAMINOPHEN 1 TABLET: 5; 325 TABLET ORAL at 11:36

## 2017-05-10 RX ADMIN — DICLOFENAC SODIUM 2 G: 10 GEL TOPICAL at 21:04

## 2017-05-10 RX ADMIN — FAMOTIDINE 20 MG: 20 TABLET ORAL at 19:09

## 2017-05-10 RX ADMIN — HYDROCODONE BITARTRATE AND ACETAMINOPHEN 1 TABLET: 5; 325 TABLET ORAL at 19:09

## 2017-05-10 RX ADMIN — HEPARIN SODIUM 5000 UNITS: 5000 INJECTION, SOLUTION INTRAVENOUS; SUBCUTANEOUS at 21:02

## 2017-05-10 RX ADMIN — TRAZODONE HYDROCHLORIDE 100 MG: 50 TABLET ORAL at 21:02

## 2017-05-10 RX ADMIN — Medication 10 ML: at 06:29

## 2017-05-10 RX ADMIN — MAGNESIUM SULFATE HEPTAHYDRATE 2 G: 40 INJECTION, SOLUTION INTRAVENOUS at 16:01

## 2017-05-10 RX ADMIN — HYDROCODONE BITARTRATE AND ACETAMINOPHEN 1 TABLET: 5; 325 TABLET ORAL at 15:31

## 2017-05-10 RX ADMIN — ROSUVASTATIN CALCIUM 5 MG: 5 TABLET ORAL at 21:02

## 2017-05-10 RX ADMIN — DIVALPROEX SODIUM 250 MG: 250 TABLET, DELAYED RELEASE ORAL at 21:02

## 2017-05-10 RX ADMIN — FAMOTIDINE 20 MG: 20 TABLET ORAL at 08:33

## 2017-05-10 RX ADMIN — HEPARIN SODIUM 5000 UNITS: 5000 INJECTION, SOLUTION INTRAVENOUS; SUBCUTANEOUS at 02:29

## 2017-05-10 RX ADMIN — HEPARIN SODIUM 5000 UNITS: 5000 INJECTION, SOLUTION INTRAVENOUS; SUBCUTANEOUS at 11:34

## 2017-05-10 RX ADMIN — CLINDAMYCIN HYDROCHLORIDE 300 MG: 150 CAPSULE ORAL at 06:29

## 2017-05-10 RX ADMIN — LEVOTHYROXINE SODIUM 100 MCG: 100 TABLET ORAL at 06:30

## 2017-05-10 RX ADMIN — Medication 10 ML: at 14:00

## 2017-05-10 RX ADMIN — CLINDAMYCIN HYDROCHLORIDE 300 MG: 150 CAPSULE ORAL at 11:34

## 2017-05-10 RX ADMIN — INSULIN GLARGINE 65 UNITS: 100 INJECTION, SOLUTION SUBCUTANEOUS at 21:07

## 2017-05-10 RX ADMIN — SERTRALINE HYDROCHLORIDE 50 MG: 50 TABLET ORAL at 08:33

## 2017-05-10 RX ADMIN — STANDARDIZED SENNA CONCENTRATE AND DOCUSATE SODIUM 1 TABLET: 8.6; 5 TABLET, FILM COATED ORAL at 08:33

## 2017-05-10 RX ADMIN — DOCUSATE SODIUM 100 MG: 100 CAPSULE, LIQUID FILLED ORAL at 19:09

## 2017-05-10 RX ADMIN — GABAPENTIN 300 MG: 300 CAPSULE ORAL at 08:33

## 2017-05-10 RX ADMIN — GABAPENTIN 300 MG: 300 CAPSULE ORAL at 21:02

## 2017-05-10 RX ADMIN — ASPIRIN 81 MG: 81 TABLET, COATED ORAL at 08:32

## 2017-05-10 RX ADMIN — GABAPENTIN 300 MG: 300 CAPSULE ORAL at 16:06

## 2017-05-10 RX ADMIN — ARIPIPRAZOLE 10 MG: 10 TABLET ORAL at 08:33

## 2017-05-10 NOTE — IP AVS SNAPSHOT
Summary of Care Report The Summary of Care report has been created to help improve care coordination. Users with access to [employer] or [address] (Web-based application) may access additional patient information including the Discharge Summary. If you are not currently a [address] user and need more information, please call the number listed below in the [place] [address] section and ask to be connected with Medical Records. Facility Information Name Address Phone [address] [place] [address] [address] [facility] [address] [phone] Patient Information Patient Name Sex UMA Bryant January ([id number]) Female 1955 Discharge Information Admitting Provider Service Area Unit Lea Clark MD / [address] [address] [facility], [place] / [phone] Discharge Provider Discharge Date/Time Discharge Disposition Destination (none) 2017 Afternoon (Pending) [facility] (none) Patient Language Language ENGLISH [13] Hospital Problems as of 2017  Reviewed: 2017 10:24 AM by Lea Clark MD  
  
  
  
 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  
  Olecranon bursitis of right elbow  5/4/2017 - Present 5/10/2017 by Sybil Stock, MD Yes Entered by Ariel Thomas MD  
  Contusion of left elbow  5/4/2017 - Present 5/10/2017 by Sybil Stock, MD Yes Entered by Ariel Thomas MD  
  Right Achilles tendinitis  5/2/2017 - Present 5/10/2017 by Sybil Stock, MD Yes Entered by Sybil Stock MD  
  Impaired mobility and ADLs  5/4/2017 - Present 5/10/2017 by Sybil Stock, MD Yes Entered by Sybil Stock MD  
  Acute renal failure superimposed on stage 3 chronic kidney disease ([facility] [address]  5/15/2017 - Present 5/15/2017 by Sybil Stock, MD No  
  Entered by Sybil Stock MD  
  Hypomagnesemia  5/22/2017 - Present 5/22/2017 by Sybil Stock, MD No  
  Entered by Sybil Stock MD  
  Overview Signed 5/22/2017  2:29 PM by Sybil Stock MD  
   Mg (5/22/2017) = 1. Hyperuricemia (Chronic)  5/26/2017 - Present 5/31/2017 by Sybil Stock, MD Yes Entered by Sybil Stock MD  
  History of vitamin D deficiency (Chronic)  5/10/2017 - Present 5/31/2017 by Sybil Stock MD Yes Entered by Sybil Stock MD  
  Urge urinary incontinence (Chronic)  5/10/2017 - Present 5/31/2017 by Sybil Stock MD Yes Entered by Sybil Stock MD  
  
Non-Hospital Problems as of 5/31/2017  Reviewed: 5/31/2017 10:24 AM by Sybil Stock MD  
  
  
  
 Class Noted - Resolved Last Modified Active Problems Diabetic neuropathy associated with type 2 diabetes mellitus ([facility] [address] (Chronic)  Unknown - Present 5/10/2017 by Sybil Stock MD  
  Entered by Brenda Cardenas Venous insufficiency (Chronic)  Unknown - Present 5/10/2017 by Sybil Stock MD  
  Entered by Brenda Cardenas Obesity, Class I, BMI 30-34.9 (Chronic)  Unknown - Present 5/10/2017 by Sybil Stock MD  
  Entered by Brenda Cardenas   Chronic back pain (Chronic)  Unknown - Present 5/10/2017 by Sybil Stock MD  
 Entered by Brenda Cardenas Generalized osteoarthritis of multiple sites (Chronic)  Unknown - Present 5/10/2017 by Sybil Stock MD  
  Entered by Herlinda Elizalde Bipolar affective disorder ([facility] [address] (Chronic)  12/5/2012 - Present 5/10/2017 by Sybil Stock MD  
  Entered by Juventino Araujo Abnormally low high density lipoprotein (HDL) cholesterol with hypertriglyceridemia (Chronic)  Unknown - Present 5/10/2017 by Sybil Stock MD  
  Entered by Saniya Patton  
  Overview Addendum 5/10/2017  3:26 PM by Sybil Stock MD  
   Lipid profile (11/6/2016) showed , , HDL 38, LDL 37 Diastolic dysfunction without heart failure (Chronic)  Unknown - Present 5/10/2017 by Sybil Stock MD  
  Entered by Ruby Salas Signed 4/23/2013 10:00 AM by Ruby Medina Stable on diuretics Chronic obstructive pulmonary disease (COPD) ([facility] [address] (Chronic)  Unknown - Present 5/10/2017 by Sybil Stock MD  
  Entered by Ruby Salas Signed 4/23/2013 10:01 AM by Ruby Medina SOB, on paula O2 Recent admission with psychosis CKD stage G3a/A1, GFR 45-59 and albumin creatinine ratio <30 mg/g (Chronic)  5/11/2017 - Present 5/11/2017 by Sybil Stock MD  
  Entered by Ruby Salas Signed 5/11/2017  4:35 PM by Sybil Stock MD  
   Urine microalbumin/Creatinine ratio (5/11/2017) = 9 mg/g Depression (Chronic)  Unknown - Present 5/10/2017 by Sybil Stock MD  
  Entered by Ruby Medina History of back injury  Unknown - Present 5/10/2017 by Sybil Stock MD  
  Entered by Ruby Salas Signed 4/23/2013 10:20 AM by Ruby Medina  
   jumped out of second story window Anxiety (Chronic)  Unknown - Present 5/10/2017 by Sybil Stock MD  
  Entered by Ruby Medina Wears glasses  Unknown - Present 4/23/2013 by Ruby Medina Entered by Ruby Medina History of hepatitis C  Unknown - Present 5/10/2017 by Hilda Muniz MD  
  Entered by Maira Garay Sickle cell trait ([address] (Chronic)  Unknown - Present 5/10/2017 by Hilda Muniz MD  
  Entered by Maira Garay History of acute renal failure  5/31/2013 - Present 5/10/2017 by Hilda Muniz MD  
  Entered by Shirlene Pelt Hypothyroidism (Chronic)  Unknown - Present 5/10/2017 by Hilda Muniz MD  
  Entered by Shirlene Pelt Recurrent genital herpes (Chronic)  5/31/2013 - Present 5/10/2017 by Hilda Muniz MD  
  Entered by Shirlene Pelt Overview Deleted 5/10/2017  3:18 PM by Hilda Muniz MD  
     
  
  Sarcoidosis [facility]) (Chronic)  Unknown - Present 5/10/2017 by Hilda Muniz MD  
  Entered by Shirlene Pelt Abscess of right arm  6/3/2013 - Present 6/4/2013 Entered by Nella Posadas MD  
  Hypoxemia requiring supplemental oxygen (Chronic)  12/29/2014 - Present 5/10/2017 by Hilda Muniz MD  
  Entered by Ge Zapata MD  
  Abnormal nuclear stress test  11/17/2016 - Present 11/17/2016 by Connor Garvey MD  
  Entered by Connor Garvey MD  
  Overview Signed 11/17/2016 12:45 PM by Connor Garvey MD  
   mild in ischemia-medically treated   Cellulitis and abscess of hand  4/13/2017 - Present 4/13/2017 by Capo Suárez MD  
  Entered by Capo Suárez MD  
  Cellulitis and abscess of foot  4/13/2017 - Present 4/13/2017 by Capo Suárez MD  
  Entered by Capo Suárez MD  
  Intravenous drug user  5/2/2017 - Present 5/10/2017 by Hilda Muniz MD  
  Entered by Hilda Muniz MD  
  History of penicillin allergy  Unknown - Present 5/10/2017 by Hilda Muniz MD  
  Entered by Hilda Muniz MD  
  Hypokalemia  5/4/2017 - Present 5/10/2017 by Hilda Muniz MD  
  Entered by Hilda Muniz MD  
  Falls frequently  Unknown - Present 5/10/2017 by Hilda Muniz MD  
  Entered by Hilda Muniz MD  
 Gastroesophageal reflux disease with hiatal hernia (Chronic)  Unknown - Present 5/10/2017 by Hayde Anthony MD  
  Entered by Hayde Anthony MD  
  Memory difficulty  Unknown - Present 5/10/2017 by Hayde Anthony MD  
  Entered by Hayde Anthony MD  
  Mixed connective tissue disease [facility]) (Chronic)  5/10/2017 - Present 5/10/2017 by Hayde Anthony MD  
  Entered by Hayde Anthony MD  
  
You are allergic to the following Allergen Reactions Moxifloxacin Rash Pcn (Penicillins) Swelling Sulfa (Sulfonamide Antibiotics) Swelling Current Discharge Medication List  
  
START taking these medications Dose & Instructions Dispensing Information Comments  
 aspirin 81 mg chewable tablet Dose:  81 mg Take 1 Tab by mouth daily (with breakfast). Indications: prevention of cerebrovascular accident Quantity:  15 Tab Refills:  0  
   
 cholecalciferol 1,000 unit tablet Commonly known as:  VITAMIN D3 Dose:  2000 Units Take 2 Tabs by mouth daily. Indications: PREVENTION OF VITAMIN D DEFICIENCY Quantity:  30 Tab Refills:  0  
   
 famotidine 20 mg tablet Commonly known as:  PEPCID Dose:  20 mg Take 1 Tab by mouth daily. Indications: PREVENTION OF STRESS ULCER Quantity:  15 Tab Refills:  0  
   
 febuxostat 40 mg Tab tablet Commonly known as:  Asael [place] Dose:  40 mg Take 1 Tab by mouth daily. Indications: GOUT PREVENTION Quantity:  15 Tab Refills:  0  
   
 magnesium oxide 400 mg tablet Commonly known as:  MAG-OX Dose:  400 mg Take 1 Tab by mouth two (2) times a day for 7 days. Indications: HYPOMAGNESEMIA Quantity:  14 Tab Refills:  0  
   
 tolterodine 1 mg tablet Commonly known as:  Marthenia Jian Dose:  1 mg Take 1 Tab by mouth two (2) times a day. Indications: URINARY URGE INCONTINENCE Quantity:  30 Tab Refills:  0 CONTINUE these medications which have CHANGED Dose & Instructions Dispensing Information Comments insulin glargine 100 unit/mL injection Commonly known as:  LANTUS What changed:  additional instructions Inject 70 units subcutaneously before breakfast (7AM) and 40 units subcutaneously after dinner (7PM). Indications: type 2 diabetes mellitus Quantity:  1 Vial  
Refills:  0  
   
 insulin lispro 100 unit/mL injection Commonly known as:  HUMALOG What changed:  additional instructions To be given 15 min before meals: < 150 - None, 151-200 - 2 u, 201-250 - 4 u, 251-300 - 6 u, 301-350 - 8 u, 351-400 - 10 u, >400 - 12 u Quantity:  1 Vial  
Refills:  0  
   
 levothyroxine 100 mcg tablet Commonly known as:  SYNTHROID What changed:  how much to take Dose:  100 mcg Take 1 Tab by mouth Daily (before breakfast). Indications: hypothyroidism Quantity:  15 Tab Refills:  0  
   
 oxyCODONE-acetaminophen 5-325 mg per tablet Commonly known as:  PERCOCET What changed:  when to take this Dose:  1 Tab Take 1 Tab by mouth every six (6) hours as needed for Pain. Max Daily Amount: 4 Tabs. Indications: Pain Quantity:  15 Tab Refills:  0 CONTINUE these medications which have NOT CHANGED Dose & Instructions Dispensing Information Comments ABILIFY 5 mg tablet Generic drug:  ARIPiprazole Dose:  10 mg Take 10 mg by mouth daily. Indications: BIPOLAR DISORDER IN REMISSION Refills:  0  
 take 2 tabs each morning * albuterol 2.5 mg /3 mL (0.083 %) nebulizer solution Commonly known as:  PROVENTIL VENTOLIN  
 USE 1 NEB EVERY 4 HOURS FOR WHEEZING AND SHORTNESS OF BREATH  Indications: CHRONIC OBSTRUCTIVE PULMONARY DISEASE Quantity:  2 Package Refills:  3  
   
 * VENTOLIN HFA 90 mcg/actuation inhaler Generic drug:  albuterol INHALE 2 PUFFS EVERY 4 HOURS AS NEEDED Quantity:  1 Inhaler Refills:  4 DEPAKOTE 250 mg tablet Generic drug:  divalproex DR Dose:  250 mg Take 250 mg by mouth nightly.  Indications: BIPOLAR DISORDER IN REMISSION  
 Refills:  0  
   
 insulin syringe,safetyneedle 1 mL 30 gauge x 5/16\" Syrg As directed Quantity:  50 Each Refills:  0 METANX 2.8-2-25 mg Tab Generic drug:  l-methylfolate-vit b12-vit b6 Dose:  1 Tab Take 1 Tab by mouth daily. Refills:  0 Nebulizer Accessories Kit Use with nebulizer machine Quantity:  1 Kit Refills:  prn NEURONTIN 300 mg capsule Generic drug:  gabapentin Dose:  300 mg Take 300 mg by mouth three (3) times daily. Indications: NEUROPATHIC PAIN Refills:  0 Oxygen-Air Delivery Systems  
 by Nasal route continuous. 2 LPM via NC  Indications: Hypoxemia requiring supplementary oxygen Refills:  0  
   
 rosuvastatin 5 mg tablet Commonly known as:  CRESTOR Dose:  5 mg Take 1 Tab by mouth nightly. Quantity:  30 Tab Refills:  6  
   
 traZODone 100 mg tablet Commonly known as:  Orma Paddy Dose:  100 mg Take 100 mg by mouth nightly. Indications: major depressive disorder Refills:  0  
   
 TYLENOL 325 mg tablet Generic drug:  acetaminophen Dose:  325 mg Take 325 mg by mouth every six (6) hours as needed for Pain. Indications: Fever, Pain Refills:  0  
   
 umeclidinium-vilanterol 62.5-25 mcg/actuation inhaler Commonly known as:  Paulo Gusman Dose:  1 Puff Take 1 Puff by inhalation daily. Quantity:  1 Inhaler Refills:  5 ZOLOFT 100 mg tablet Generic drug:  sertraline Dose:  50 mg Take 50 mg by mouth daily. Indications: DEPRESSION ASSOCIATED WITH BIPOLAR DISORDER Refills:  0  
   
 * Notice: This list has 2 medication(s) that are the same as other medications prescribed for you. Read the directions carefully, and ask your doctor or other care provider to review them with you. STOP taking these medications Comments  
 eplerenone 25 mg tablet Commonly known as:  INSPRA  
   
   
 ergocalciferol 50,000 unit capsule Commonly known as:  VITAMIN D2  
   
   
 furosemide 20 mg tablet Commonly known as:  LASIX  
   
   
 insulin CONCENTRATED regular 500 unit/mL Soln Commonly known as:  U-500 CONCENTRATED  
   
   
 potassium chloride 20 mEq packet Commonly known as:  KLOR-CON Current Immunizations Name Date Influenza Vaccine 10/10/2015, 12/29/2014 Influenza Vaccine Split 10/30/2012 Pneumococcal Polysaccharide (PPSV-23) 7/10/2015 Follow-up Information Follow up With Details Comments Contact [facility] [address] 
[phone] Cori Abdi MD On 6/1/2017 Patient has an appointment scheduled with PCP Dr. Scooter Coley on June 1, 2017 @ 2:00pm. [facility] [place] [place] [address] [phone] Simon Alston MD On 6/14/2017 Patient has an appointment scheduled with Orthopedic Dr. Joshua Samuel on June 14, 2017 @ 10:00am. [address] 
[address] [facility] [address] 
[phone] Jef Dia MD On 6/12/2017 Patient has an appointment scheduled with Rheumatologist Dr. Everardo Hahn on June 12, 2017 @ 2:30pm.  Patient will need to arrvie 15mins. early for paperwork. [address] [place] [address] Street Tim Cheek MD 
[facility] [address] [phone] Discharge Instructions DISCHARGE SUMMARY from Nurse The following personal items are in your possession at time of discharge: 
 
Dental Appliances: Uppers Visual Aid: None Home Medications: None Jewelry: Earrings (gold hoops) Clothing: At bedside Other Valuables: Cell Phone PATIENT INSTRUCTIONS: 
 
After general anesthesia or intravenous sedation, for 24 hours or while taking prescription Narcotics: · Limit your activities · Do not drive and operate hazardous machinery · Do not make important personal or business decisions · Do  not drink alcoholic beverages · If you have not urinated within 8 hours after discharge, please contact your surgeon on call. Report the following to your surgeon: 
· Excessive pain, swelling, redness or odor of or around the surgical area · Temperature over 100.5 · Nausea and vomiting lasting longer than 4 hours or if unable to take medications · Any signs of decreased circulation or nerve impairment to extremity: change in color, persistent  numbness, tingling, coldness or increase pain · Any questions What to do at Home: *  Please give a list of your current medications to your Primary Care Provider. *  Please update this list whenever your medications are discontinued, doses are 
    changed, or new medications (including over-the-counter products) are added. *  Please carry medication information at all times in case of emergency situations. These are general instructions for a healthy lifestyle: No smoking/ No tobacco products/ Avoid exposure to second hand smoke Surgeon General's Warning:  Quitting smoking now greatly reduces serious risk to your health. Obesity, smoking, and sedentary lifestyle greatly increases your risk for illness A healthy diet, regular physical exercise & weight monitoring are important for maintaining a healthy lifestyle You may be retaining fluid if you have a history of heart failure or if you experience any of the following symptoms:  Weight gain of 3 pounds or more overnight or 5 pounds in a week, increased swelling in our hands or feet or shortness of breath while lying flat in bed. Please call your doctor as soon as you notice any of these symptoms; do not wait until your next office visit. Recognize signs and symptoms of STROKE: 
 
F-face looks uneven A-arms unable to move or move unevenly S-speech slurred or non-existent T-time-call 911 as soon as signs and symptoms begin-DO NOT go Back to bed or wait to see if you get better-TIME IS BRAIN. Warning Signs of HEART ATTACK Call 911 if you have these symptoms: ? Chest discomfort. Most heart attacks involve discomfort in the center of the chest that lasts more than a few minutes, or that goes away and comes back. It can feel like uncomfortable pressure, squeezing, fullness, or pain. ? Discomfort in other areas of the upper body. Symptoms can include pain or discomfort in one or both arms, the back, neck, jaw, or stomach. ? Shortness of breath with or without chest discomfort. ? Other signs may include breaking out in a cold sweat, nausea, or lightheadedness. Don't wait more than five minutes to call [address]! Fast action can save your life. Calling 911 is almost always the fastest way to get lifesaving treatment. Emergency Medical Services staff can begin treatment when they arrive  up to an hour sooner than if someone gets to the hospital by car. The discharge information has been reviewed with the patient. The patient verbalized understanding. Discharge medications reviewed with the patient and appropriate educational materials and side effects teaching were provided. Patient armband removed and shredded [employer] Activation Thank you for requesting access to [employer]. Please follow the instructions below to securely access and download your online medical record. [employer] allows you to send messages to your doctor, view your test results, renew your prescriptions, schedule appointments, and more. How Do I Sign Up? 1. In your internet browser, go to [email or web address] 
2. Click on the First Time User? Click Here link in the Sign In box. You will be redirect to the New Member Sign Up page. 3. Enter your [employer] Access Code exactly as it appears below. You will not need to use this code after youve completed the sign-up process. If you do not sign up before the expiration date, you must request a new code. [employer] Access Code: Activation code not generated Current [employer] Status: Patient Declined (This is the date your [employer] access code will ) 4. Enter the last four digits of your Social Security Number (xxxx) and Date of Birth (mm/dd/yyyy) as indicated and click Submit. You will be taken to the next sign-up page. 5. Create a [employer] ID. This will be your [employer] login ID and cannot be changed, so think of one that is secure and easy to remember. 6. Create a [employer] password. You can change your password at any time. 7. Enter your Password Reset Question and Answer. This can be used at a later time if you forget your password. 8. Enter your e-mail address. You will receive e-mail notification when new information is available in [address] 9. Click Sign Up. You can now view and download portions of your medical record. 10. Click the Download Summary menu link to download a portable copy of your medical information. Additional Information If you have questions, please visit the Frequently Asked Questions section of the [employer] website at [email or web address] Remember, [employer] is NOT to be used for urgent needs. For medical emergencies, dial 911. 
 
 
 
 
 
 
 
 
------------------------------------------------------------------------------------------------------------ 
 
DISCHARGE INSTRUCTIONS 1. Make sure that when you request refills at the pharmacy that the refill requests are sent to your PCP (NOT to the prescriber at the [facility]) to avoid any delays in getting your medication refills. The physician at the [facility] for  Osmel Fuentes will not able to order medications or refills after discharge -- Please understand that though we would like to help, it is simply not safe for our physician to order you a medication that we cannot monitor.   
 
2. If any of the prescribed medications require a prior authorization, contact your Primary Care Physician or specialist to EITHER complete prior authorizations and paperwork on your behalf OR prescribe an alternative medication. -- Please understand that though we would like to help, it is simply not safe for our physician to order you a medication that we cannot monitor. 3. Over-the-counter (OTC) medications will NOT be prescribed. You need to buy these medications over-the-counter. ------------------------------------------------------------------------------------------------------------------- Chart Review Routing History Recipient Method Report Sent By [employer] Fax: [phone] Phone: [phone] Fax Provider Comm Report Sanju Xie [[id number]] 11/13/2012  5:44 PM 11/13/2012 Yared Spain MD  
[facility]: [phone] [facility] [[id number][facility] 12/7/2012  1:03 PM 12/07/2012 Steven Llamas MD  
Phone: [phone] [facility] [[id number]] 12/7/2012  1:03 PM 12/07/2012 Marcus Jones MD  
Fax: [phone] Phone: [phone] Fax IP [employer], [address] [place] [address] [[id number]] 12/9/2012 11:39 AM 12/09/2012 Yared Spain MD  
Phone: [phone] In [place] [employer], [address] [place] [address] [[id number]] 12/9/2012 11:39 AM 12/09/2012 Emmett Rodriguez MD  
Phone: [phone] In [place] [employer], [address] [place] [address] [[id number]] 12/9/2012 11:39 AM 12/09/2012 [employer] Fax: [phone] Fax Provider Comm Report Sanju Xie [[id number]] 1/9/2013 11:25 AM 01/09/2013 Axel Zhong MD  
Fax: [phone] Phone: [phone] Fax Provider Comm Report Gali Boogie [[id number]] 2/7/2013  3:41 PM 10/01/2012 Provider Comm Report Gali Boogie [[id number]] 2/7/2013  3:41 PM 06/01/2012 Provider Comm Report Gali Boogie [[id number]] 2/7/2013  3:41 PM 04/13/2012 Provider Comm Report Gali Boogie [[id number]] 2/7/2013  3:41 PM 10/12/2011 Anil Haney MD  
Fax: [phone] Phone: [phone] Fax [facility] consolidated with past values Kasey Raya [[id number]] 4/24/2013  2:07 Hay Jennings MD  
Phone: [phone] In Basket IP Auto Routed Branden YING MD [[id number]] 5/31/2013  3:28 AM 05/31/2013 Kera Kam MD  
Fax: [phone] Phone: [phone] Fax IP Auto Routed Branden YING MD [[id number]] 5/31/2013  3:28 AM 05/31/2013 Gerardo Fletcher MD  
Phone: [phone] In Basket IP Auto Routed Branden YING MD [[id number]] 5/31/2013  3:28 AM 05/31/2013 Lourdes Acosta MD  
Fax: [phone] Phone: [phone] Fax IP Auto Routed Branden YING MD [[id number]] 5/31/2013  3:28 AM 05/31/2013 Andrew Elizabeth MD  
Fax: [phone] Phone: [phone] Fax IP Auto Routed Trans Martha Naylor MD [[id number]] 6/5/2013 12:45 PM 06/05/2013 Gerardo Fletcher MD  
Phone: [phone] In Basket IP Auto Routed Trans Martha Naylor MD [[id number]] 6/5/2013 12:45 PM 06/05/2013 Lourdes Acosta MD  
Fax: [phone] Phone: [phone] Fax IP Auto Routed Trans Martha Naylor MD [[id number]] 6/5/2013 12:45 PM 06/05/2013 Martha Naylor MD  
Phone: [phone] In Basket IP Auto Routed Trans Martha Naylor MD [[id number]] 6/5/2013 12:45 PM 06/05/2013 Andrew Elizabeth MD  
Fax: [phone] Phone: [phone] Fax IP Auto Routed Trans Martha Naylor MD [[id number]] 6/5/2013  6:33 PM 06/05/2013 Gerardo Fletcher MD  
Phone: [phone] In [employer] IP Auto Routed Trans Martha Naylor MD [[id number]] 6/5/2013  6:33 PM 06/05/2013 Lourdes Acosta MD  
Fax: [phone] Phone: [phone] Fax IP Auto Routed Trans Martha Naylor MD [[id number]] 6/5/2013  6:33 PM 06/05/2013 Martha Naylor MD  
Phone: [phone] In [employer] IP Auto Routed Trans Martha Naylor MD [[id number]] 6/5/2013  6:33 PM 06/05/2013 David Marrero MD  
Phone: [phone] In Basket IP Auto Routed Branden YING MD [[id number]] 6/14/2013  8:07 PM 06/14/2013 Rufino Gonzalez MD  
Fax: [phone] Phone: [phone] Fax IP Auto Routed Branden YING MD [[id number]] 6/14/2013  8:07 PM 06/14/2013 Leandra Esquivel MD  
Phone: [phone] In Basket IP Auto Routed Branden YING MD [[id number]] 6/14/2013  8:07 PM 06/14/2013 Alesia Metz MD  
Fax: [phone] Phone: [phone] Fax IP Auto Routed Branden YING MD [[id number]] 6/14/2013  8:07 PM 06/14/2013 Benson Stevens MD  
Fax: [phone] Phone: [phone] Fax [facility] IP MD NOTES AUTO ROUTING REPORT Isaias Roberts MD [[id number]] 11/1/2016  2:57 AM 11/01/2016 Isaias Roberts MD  
 In Basket IP Auto Routed [employer]. Yuriy Roberts MD [[id number]] 11/1/2016  2:57 AM 11/01/2016 Benson Stevens MD  
Fax: [phone] Phone: [phone] Fax IP Auto Routed US Sherita MD [[id number]] 11/7/2016 11:00 AM 11/07/2016 Benson Stevens MD  
Fax: [phone] Phone: [phone] Fax IP Auto Routed Emy Rhodes MD [[id number]] 11/9/2016 10:18 AM 11/09/2016 Shelbie Dai MD  
Phone: [phone] In Basket IP Auto Routed [employer], MD [[id number]] 11/9/2016 10:18 AM 11/09/2016 Gregg Harrison MD  
Phone: [phone] In Basket IP Auto Routed [employer], MD [[id number]] 11/9/2016 10:18 AM 11/09/2016 Benson Stevens MD  
Fax: [phone] Phone: [phone] Fax IP Auto Routed Branden YING MD [[id number]] 4/13/2017  8:15 PM 04/13/2017 [facility] Fax: [phone] [facility] Daiana Hope [[id number]] 5/2/2017 12:20 AM 5/1/2017 Jody Gonzalez MD  
Phone: [phone] In Basket IP Auto Routed Emy Rhodes MD [[id number]] 5/10/2017  4:16 PM 05/10/2017 Benson Stevens MD  
Fax: [phone] Phone: [phone] Fax IP Auto Routed Emy Rhodes  [phone] 5/10/2017  4:16 PM 05/10/2017 Fanta Gramajo MD  
Phone: [phone] In Basket IP Auto Routed Emy Rhodes MD [[id number]] 5/10/2017  4:16 PM 05/10/2017 Kiersten Horne MD  
Phone: [phone] In Basket IP Auto Routed Emy Rhodes MD [[id number]] 5/10/2017  4:16 PM 05/10/2017 Madeline Martinez MD  
Phone: [phone] In Basket IP Auto Routed [employer], MD [[id number]] 5/10/2017  4:16 PM 05/10/2017

## 2017-05-10 NOTE — PROGRESS NOTES
ARU/IPR REFERRAL CONTACT NOTE  [id number] [place] [address] for Physical Rehabilitation    RE: Gaurav Waller    Referral received to review this patient's case for admission to [id number] [place] [address] for Physical Rehabilitation. Current status reviewed with team and patient meets criteria for admission to [facility] for [facility] pending authorization from [facility]. Case has been opened with [facility]; and is pending review. Writer will notify  of status/determination once obtained. Thank you for this referral.  Should you have any questions please do not hesitate to call. Sincerely,  Jonas Chew. JENNY Reddy  [address] [place] [address] Physical Rehabilitation  [phone]

## 2017-05-10 NOTE — PROGRESS NOTES
THERESA BREANNA         Stephen A Roots resting at this time and she offers no new complaints this AM.         Visit Vitals    /79 (BP 1 Location: Left arm, BP Patient Position: At rest)    Pulse 66    Temp 98.4 °F (36.9 °C)    Resp 18    Ht 5' 5\" (1.651 m)    Wt 210 lb (95.3 kg)    SpO2 93%    BMI 34.95 kg/m2         On examination 5/10/2017  , the patient is alert, oriented (name, place, time) and follows commands. she is in no acute distress and her affect and mood are appropriate.       Left hand: dressings in place; NT flexor/extensor left hand tendons. Left dorsum foot: dry eschar in place: no pus/fluctuance  Right arm/elbow: cellulitis resolving nicely. No evidence of septic olecranon bursitis: very mild posterior distal humerus skin edema. Right ankle: mild tenderness to distal posterior achilles region. No erythema/fluctuance noted. AROM/PROM non tender at right ankle      CMP:   Lab Results   Component Value Date/Time     05/10/2017 03:50 AM    K 4.5 05/10/2017 03:50 AM     05/10/2017 03:50 AM    CO2 28 05/10/2017 03:50 AM    AGAP 7 05/10/2017 03:50 AM     (H) 05/10/2017 03:50 AM    BUN 19 (H) 05/10/2017 03:50 AM    CREA 1.33 (H) 05/10/2017 03:50 AM    GFRAA 49 (L) 05/10/2017 03:50 AM    GFRNA 41 (L) 05/10/2017 03:50 AM    CA 9.5 05/10/2017 03:50 AM    MG 1.6 05/10/2017 03:50 AM     CBC: No results found for: WBC, HGB, HGBEXT, HCT, HCTEXT, PLT, PLTEXT, HGBEXT, HCTEXT, PLTEXT  COAGS: No results found for: APTT, PTP, INR      AP  1. 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 Left elbow contusion: no Fx, encourager AROM/PROM  3. Right arm cellulitis: improving nicely  4. Right posterior achilles tendonitis: PT  5.  ABX per ID recommendation      Will sign off at this point      Bhavani Galindo MD  5/10/2017  6:27 AM

## 2017-05-10 NOTE — IP AVS SNAPSHOT
Caro Patrick 
 
 
 [address] Patient: Ju Gonzalez MRN: [id number]:[id number] You are allergic to the following Allergen Reactions Moxifloxacin Rash Pcn (Penicillins) Swelling Sulfa (Sulfonamide Antibiotics) Swelling Recent Documentation Height Weight BMI OB Status Smoking Status 1.651 m 95.3 kg 34.96 kg/m2 Postmenopausal Former Smoker Emergency Contacts Name Discharge Info Relation Home Work Mobile Rey Sinha DISCHARGE CAREGIVER [3] Brother [24] [phone] Love Sinha DISCHARGE CAREGIVER [3] Daughter [21] [phone] Love Sinha  Child [2] [phone] About your hospitalization You were admitted on:  May 10, 2017 You last received care in the:  [facility] IP REHAB UNIT You were discharged on:  May 31, 2017 Unit phone number:  [phone] Why you were hospitalized Your primary diagnosis was:  Sirs (Systemic Inflammatory Response Syndrome) (Hcc) Your diagnoses also included:  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, Cellulitis Of Right Forearm, Contusion Of Left Elbow, Olecranon Bursitis Of Right Elbow, Right Achilles Tendinitis, Type 2 Diabetes With Stage 3 Chronic Kidney Disease Gfr 30-59 (Hcc), Impaired Mobility And Adls, Acute Renal Failure Superimposed On Stage 3 Chronic Kidney Disease (Hcc), Hypomagnesemia, Hyperuricemia, History Of Vitamin D Deficiency, Urge Urinary Incontinence Providers Seen During Your Hospitalizations Provider Role Specialty Primary office phone Brandon Damian MD Attending Provider Internal Medicine [phone] Your Primary Care Physician (PCP) Primary Care Physician Office Phone Office Fax Parth Randolph [phone] Follow-up Information Follow up With Details Comments Contact Info [facility], [address] 
[phone] Virginia Pozo MD On 6/1/2017 Patient has an appointment scheduled with PCP Dr. Oscar Aburto on June 1, 2017 @ 2:00pm. [address] [facility] [address] 
[phone] Guanako Villeda MD On 6/14/2017 Patient has an appointment scheduled with Orthopedic Dr. Loretta Hartley on June 14, 2017 @ 10:00am. [facility] 
[address] [facility] [address] 
[phone] Isabella Solares MD On 6/12/2017 Patient has an appointment scheduled with Rheumatologist Dr. Sagar France on June 12, 2017 @ 2:30pm.  Patient will need to arrvie 15mins. early for paperwork. [address] [place] [address] Mckayla Grijalva MD 
[facility] [address] 
[phone] Your Appointments Wednesday June 14, 2017 10:00 AM EDT New Patient with Guanako Villeda MD  
[address] [facility], [address] [facility] [address] [facility], [address] [facility]  
[phone] Current Discharge Medication List  
  
START taking these medications Dose & Instructions Dispensing Information Comments Morning Noon Evening Bedtime  
 aspirin 81 mg chewable tablet Your last dose was: Your next dose is:    
   
   
 Dose:  81 mg Take 1 Tab by mouth daily (with breakfast). Indications: prevention of cerebrovascular accident Quantity:  15 Tab Refills:  0  
     
   
   
   
  
 cholecalciferol 1,000 unit tablet Commonly known as:  VITAMIN D3 Your last dose was: Your next dose is:    
   
   
 Dose:  2000 Units Take 2 Tabs by mouth daily. Indications: PREVENTION OF VITAMIN D DEFICIENCY Quantity:  30 Tab Refills:  0  
     
   
   
   
  
 famotidine 20 mg tablet Commonly known as:  PEPCID Your last dose was:     
   
Your next dose is:    
   
   
 Dose:  20 mg  
 Take 1 Tab by mouth daily. Indications: PREVENTION OF STRESS ULCER Quantity:  15 Tab Refills:  0  
     
   
   
   
  
 febuxostat 40 mg Tab tablet Commonly known as:  Renata Liter Your last dose was: Your next dose is:    
   
   
 Dose:  40 mg Take 1 Tab by mouth daily. Indications: GOUT PREVENTION Quantity:  15 Tab Refills:  0  
     
   
   
   
  
 magnesium oxide 400 mg tablet Commonly known as:  MAG-OX Your last dose was: Your next dose is:    
   
   
 Dose:  400 mg Take 1 Tab by mouth two (2) times a day for 7 days. Indications: HYPOMAGNESEMIA Quantity:  14 Tab Refills:  0  
     
   
   
   
  
 tolterodine 1 mg tablet Commonly known as:  Jim Fusi Your last dose was: Your next dose is:    
   
   
 Dose:  1 mg Take 1 Tab by mouth two (2) times a day. Indications: URINARY URGE INCONTINENCE Quantity:  30 Tab Refills:  0 CONTINUE these medications which have CHANGED Dose & Instructions Dispensing Information Comments Morning Noon Evening Bedtime  
 insulin glargine 100 unit/mL injection Commonly known as:  LANTUS What changed:  additional instructions Your last dose was: Your next dose is:    
   
   
 Inject 70 units subcutaneously before breakfast (7AM) and 40 units subcutaneously after dinner (7PM). Indications: type 2 diabetes mellitus Quantity:  1 Vial  
Refills:  0  
     
   
   
   
  
 insulin lispro 100 unit/mL injection Commonly known as:  HUMALOG What changed:  additional instructions Your last dose was: Your next dose is: To be given 15 min before meals: < 150 - None, 151-200 - 2 u, 201-250 - 4 u, 251-300 - 6 u, 301-350 - 8 u, 351-400 - 10 u, >400 - 12 u Quantity:  1 Vial  
Refills:  0  
     
   
   
   
  
 levothyroxine 100 mcg tablet Commonly known as:  SYNTHROID What changed:  how much to take Your last dose was: Your next dose is:    
   
   
 Dose:  100 mcg Take 1 Tab by mouth Daily (before breakfast). Indications: hypothyroidism Quantity:  15 Tab Refills:  0  
     
   
   
   
  
 oxyCODONE-acetaminophen 5-325 mg per tablet Commonly known as:  PERCOCET What changed:  when to take this Your last dose was: Your next dose is:    
   
   
 Dose:  1 Tab Take 1 Tab by mouth every six (6) hours as needed for Pain. Max Daily Amount: 4 Tabs. Indications: Pain Quantity:  15 Tab Refills:  0 CONTINUE these medications which have NOT CHANGED Dose & Instructions Dispensing Information Comments Morning Noon Evening Bedtime ABILIFY 5 mg tablet Generic drug:  ARIPiprazole Your last dose was: Your next dose is:    
   
   
 Dose:  10 mg Take 10 mg by mouth daily. Indications: BIPOLAR DISORDER IN REMISSION Refills:  0  
 take 2 tabs each morning * albuterol 2.5 mg /3 mL (0.083 %) nebulizer solution Commonly known as:  PROVENTIL VENTOLIN Your last dose was: Your next dose is:    
   
   
 USE 1 NEB EVERY 4 HOURS FOR WHEEZING AND SHORTNESS OF BREATH  Indications: CHRONIC OBSTRUCTIVE PULMONARY DISEASE Quantity:  2 Package Refills:  3  
     
   
   
   
  
 * VENTOLIN HFA 90 mcg/actuation inhaler Generic drug:  albuterol Your last dose was: Your next dose is:    
   
   
 INHALE 2 PUFFS EVERY 4 HOURS AS NEEDED Quantity:  1 Inhaler Refills:  4 DEPAKOTE 250 mg tablet Generic drug:  divalproex DR Your last dose was: Your next dose is:    
   
   
 Dose:  250 mg Take 250 mg by mouth nightly. Indications: BIPOLAR DISORDER IN REMISSION Refills:  0  
     
   
   
   
  
 insulin syringe,safetyneedle 1 mL 30 gauge x 5/16\" Syrg Your last dose was: Your next dose is: As directed Quantity:  50 Each Refills:  0 METANX 2.8-2-25 mg Tab Generic drug:  l-methylfolate-vit b12-vit b6 Your last dose was: Your next dose is:    
   
   
 Dose:  1 Tab Take 1 Tab by mouth daily. Refills:  0 Nebulizer Accessories Kit Your last dose was: Your next dose is:    
   
   
 Use with nebulizer machine Quantity:  1 Kit Refills:  prn NEURONTIN 300 mg capsule Generic drug:  gabapentin Your last dose was: Your next dose is:    
   
   
 Dose:  300 mg Take 300 mg by mouth three (3) times daily. Indications: NEUROPATHIC PAIN Refills:  0 Oxygen-Air Delivery Systems Your last dose was: Your next dose is:    
   
   
 by Nasal route continuous. 2 LPM via NC  Indications: Hypoxemia requiring supplementary oxygen Refills:  0  
     
   
   
   
  
 rosuvastatin 5 mg tablet Commonly known as:  CRESTOR Your last dose was: Your next dose is:    
   
   
 Dose:  5 mg Take 1 Tab by mouth nightly. Quantity:  30 Tab Refills:  6  
     
   
   
   
  
 traZODone 100 mg tablet Commonly known as:  Leonardo Banks Your last dose was: Your next dose is:    
   
   
 Dose:  100 mg Take 100 mg by mouth nightly. Indications: major depressive disorder Refills:  0  
     
   
   
   
  
 TYLENOL 325 mg tablet Generic drug:  acetaminophen Your last dose was: Your next dose is:    
   
   
 Dose:  325 mg Take 325 mg by mouth every six (6) hours as needed for Pain. Indications: Fever, Pain Refills:  0  
     
   
   
   
  
 umeclidinium-vilanterol 62.5-25 mcg/actuation inhaler Commonly known as:  Aleda Michelle Your last dose was: Your next dose is:    
   
   
 Dose:  1 Puff Take 1 Puff by inhalation daily. Quantity:  1 Inhaler Refills:  5 ZOLOFT 100 mg tablet Generic drug:  sertraline Your last dose was: Your next dose is:    
   
   
 Dose:  50 mg Take 50 mg by mouth daily. Indications: DEPRESSION ASSOCIATED WITH BIPOLAR DISORDER Refills:  0  
     
   
   
   
  
 * Notice: This list has 2 medication(s) that are the same as other medications prescribed for you. Read the directions carefully, and ask your doctor or other care provider to review them with you. STOP taking these medications   
 eplerenone 25 mg tablet Commonly known as:  INSPRA  
   
  
 ergocalciferol 50,000 unit capsule Commonly known as:  VITAMIN D2  
   
  
 furosemide 20 mg tablet Commonly known as:  LASIX  
   
  
 insulin CONCENTRATED regular 500 unit/mL Soln Commonly known as:  U-500 CONCENTRATED  
   
  
 potassium chloride 20 mEq packet Commonly known as:  KLOR-CON Where to Get Your Medications These medications were sent to [facility] - [place], [address]  [address], [address]  [address] Phone:  [phone]  
  aspirin 81 mg chewable tablet  
 cholecalciferol 1,000 unit tablet  
 famotidine 20 mg tablet  
 febuxostat 40 mg Tab tablet  
 insulin glargine 100 unit/mL injection  
 insulin lispro 100 unit/mL injection  
 levothyroxine 100 mcg tablet  
 magnesium oxide 400 mg tablet  
 tolterodine 1 mg tablet Information on where to get these meds will be given to you by the nurse or doctor. ! Ask your nurse or doctor about these medications  
  oxyCODONE-acetaminophen 5-325 mg per tablet Discharge Instructions DISCHARGE SUMMARY from Nurse The following personal items are in your possession at time of discharge: 
 
Dental Appliances: Uppers Visual Aid: None Home Medications: None Jewelry: Earrings (gold hoops) Clothing: At bedside Other Valuables: Cell Phone PATIENT INSTRUCTIONS: 
 
After general anesthesia or intravenous sedation, for 24 hours or while taking prescription Narcotics: · Limit your activities · Do not drive and operate hazardous machinery · Do not make important personal or business decisions · Do  not drink alcoholic beverages · If you have not urinated within 8 hours after discharge, please contact your surgeon on call. Report the following to your surgeon: 
· Excessive pain, swelling, redness or odor of or around the surgical area · Temperature over 100.5 · Nausea and vomiting lasting longer than 4 hours or if unable to take medications · Any signs of decreased circulation or nerve impairment to extremity: change in color, persistent  numbness, tingling, coldness or increase pain · Any questions What to do at Home: *  Please give a list of your current medications to your Primary Care Provider. *  Please update this list whenever your medications are discontinued, doses are 
    changed, or new medications (including over-the-counter products) are added. *  Please carry medication information at all times in case of emergency situations. These are general instructions for a healthy lifestyle: No smoking/ No tobacco products/ Avoid exposure to second hand smoke Surgeon General's Warning:  Quitting smoking now greatly reduces serious risk to your health. Obesity, smoking, and sedentary lifestyle greatly increases your risk for illness A healthy diet, regular physical exercise & weight monitoring are important for maintaining a healthy lifestyle You may be retaining fluid if you have a history of heart failure or if you experience any of the following symptoms:  Weight gain of 3 pounds or more overnight or 5 pounds in a week, increased swelling in our hands or feet or shortness of breath while lying flat in bed. Please call your doctor as soon as you notice any of these symptoms; do not wait until your next office visit. Recognize signs and symptoms of STROKE: 
 
F-face looks uneven A-arms unable to move or move unevenly S-speech slurred or non-existent T-time-call 911 as soon as signs and symptoms begin-DO NOT go Back to bed or wait to see if you get better-TIME IS BRAIN. Warning Signs of HEART ATTACK Call 911 if you have these symptoms: 
? Chest discomfort. Most heart attacks involve discomfort in the center of the chest that lasts more than a few minutes, or that goes away and comes back. It can feel like uncomfortable pressure, squeezing, fullness, or pain. ? Discomfort in other areas of the upper body. Symptoms can include pain or discomfort in one or both arms, the back, neck, jaw, or stomach. ? Shortness of breath with or without chest discomfort. ? Other signs may include breaking out in a cold sweat, nausea, or lightheadedness. Don't wait more than five minutes to call [address]! Fast action can save your life. Calling 911 is almost always the fastest way to get lifesaving treatment. Emergency Medical Services staff can begin treatment when they arrive  up to an hour sooner than if someone gets to the hospital by car. The discharge information has been reviewed with the patient. The patient verbalized understanding. Discharge medications reviewed with the patient and appropriate educational materials and side effects teaching were provided. Patient armband removed and shredded MyChart Activation Thank you for requesting access to [facility]. Please follow the instructions below to securely access and download your online medical record. [facility] allows you to send messages to your doctor, view your test results, renew your prescriptions, schedule appointments, and more. How Do I Sign Up? 1. In your internet browser, go to [email or web address] 
2. Click on the First Time User? Click Here link in the Sign In box. You will be redirect to the New Member Sign Up page. 3. Enter your [employer] Access Code exactly as it appears below. You will not need to use this code after youve completed the sign-up process. If you do not sign up before the expiration date, you must request a new code. MyChart Access Code: Activation code not generated Current [facility]orman Status: Patient Declined (This is the date your MyChart access code will ) 4. Enter the last four digits of your Social Security Number (xxxx) and Date of Birth (mm/dd/yyyy) as indicated and click Submit. You will be taken to the next sign-up page. 5. Create a [employer] ID. This will be your [facility]orman login ID and cannot be changed, so think of one that is secure and easy to remember. 6. Create a [employer] password. You can change your password at any time. 7. Enter your Password Reset Question and Answer. This can be used at a later time if you forget your password. 8. Enter your e-mail address. You will receive e-mail notification when new information is available in [address] 9. Click Sign Up. You can now view and download portions of your medical record. 10. Click the Download Summary menu link to download a portable copy of your medical information. Additional Information If you have questions, please visit the Frequently Asked Questions section of the [employer] website at [email or web address] Remember, [facility]orman is NOT to be used for urgent needs. For medical emergencies, dial 911. 
 
 
 
 
 
 
 
 
------------------------------------------------------------------------------------------------------------ 
 
DISCHARGE INSTRUCTIONS 1. Make sure that when you request refills at the pharmacy that the refill requests are sent to your PCP (NOT to the prescriber at the [facility]) to avoid any delays in getting your medication refills.  The physician at the [facility] will not able to order medications or refills after discharge -- Please understand that though we would like to help, it is simply not safe for our physician to order you a medication that we cannot monitor. 2. If any of the prescribed medications require a prior authorization, contact your Primary Care Physician or specialist to EITHER complete prior authorizations and paperwork on your behalf OR prescribe an alternative medication. -- Please understand that though we would like to help, it is simply not safe for our physician to order you a medication that we cannot monitor. 3. Over-the-counter (OTC) medications will NOT be prescribed. You need to buy these medications over-the-counter. ------------------------------------------------------------------------------------------------------------------- Discharge Orders None [facility] Announcement We are excited to announce that we are making your provider's discharge notes available to you in femeninashart. You will see these notes when they are completed and signed by the physician that discharged you from your recent hospital stay. If you have any questions or concerns about any information you see in femeninashart, please call the Health Information Department where you were seen or reach out to your Primary Care Provider for more information about your plan of care. General Information Please provide this summary of care documentation to your next provider. Patient Signature:  ____________________________________________________________ Date:  ____________________________________________________________  
  
Joann Crowe Provider Signature:  ____________________________________________________________ Date:  ____________________________________________________________

## 2017-05-10 NOTE — PROGRESS NOTES
Problem: Mobility Impaired (Adult and Pediatric)  Goal: *Acute Goals and Plan of Care (Insert Text)  Physical Therapy Goals  Initiated 5/8/2017 and to be accomplished within 7 day(s)  1. Patient will move from supine <> sit with Mod-Min A in prep for out of bed activity and change of position. 2. Patient will perform sit<> stand with Mod-Min A with rolling walker in prep for transfers/ambulation. 3. Patient will transfer from bed <> chair with Mod A with rolling walker for time up in chair for completion of ADL activity. 4. Patient will ambulate 150 feet with LRAD for increase functional mobility. 5. Patient will ascend/descend 3-5 stairs with handrail(s) with Mod A-Min A for home re-entry as needed. Outcome: Progressing Towards Goal  PHYSICAL THERAPY TREATMENT     Patient: Lamar Sinha (58 y.o. female)  Date: 5/10/2017  Diagnosis: SIRS (systemic inflammatory response syndrome) (HCC) SIRS (systemic inflammatory response syndrome) (HCC)       Precautions:     Chart, physical therapy assessment, plan of care and goals were reviewed. ASSESSMENT:  Pt with extraordinary pain and stiffness throughout body (All joints), but motion improves with PROM. Pt was able to perform 2 standing trials with 2nd trial being minimally  Assisted. Pt task 2 Right side steps with max cuing and moderate assistance. Fair- tolerance. Report given to RN, who is preparing pt for D/c. Minor bleeding from scab on dorsum of Left foot. Progression toward goals:  [ ]      Improving appropriately and progressing toward goals  [X]      Improving slowly and progressing toward goals  [ ]      Not making progress toward goals and plan of care will be adjusted       PLAN:  Patient continues to benefit from skilled intervention to address the above impairments. Continue treatment per established plan of care.   Discharge Recommendations:  Rehab  Further Equipment Recommendations for Discharge:  bedside commode, rolling walker and N/A SUBJECTIVE:   Patient stated I hurt so bad.       OBJECTIVE DATA SUMMARY:   Critical Behavior:  Neurologic State: Alert  Orientation Level: Oriented X4  Cognition: Follows commands  Safety/Judgement: Awareness of environment  Functional Mobility Training:  Bed Mobility:  Rolling: Maximum assistance  Supine to Sit: Maximum assistance  Sit to Supine: Maximum assistance  Scooting: Maximum assistance  Transfers:  Sit to Stand: Maximum assistance  Stand to Sit: Maximum assistance;Assist x2  Balance:  Sitting: Intact  Standing: Impaired; With support  Standing - Static: Poor  Standing - Dynamic : Poor  Ambulation/Gait Training:  Distance (ft): 2 Feet (ft) (R side steps)  Assistive Device: Walker, rolling  Ambulation - Level of Assistance: Maximum assistance  Gait Abnormalities: Decreased step clearance  Base of Support: Narrowed  Speed/Natasha: Pace decreased (<100 feet/min)  Therapeutic Exercises:   PROM and AAROM of Bilateral LE x 15 min   Pain  Pt reports 8/10   Activity Tolerance:   Good   Please refer to the flowsheet for vital signs taken during this treatment.   After treatment:   [ ] Patient left in no apparent distress sitting up in chair  [X] Patient left in no apparent distress in bed  [X] Call bell left within reach  [X] Nursing notified  [ ] Caregiver present  [ ] Bed alarm activated      Hanh Thompson PTA   Time Calculation: 30 mins

## 2017-05-10 NOTE — PROGRESS NOTES
Infectious Disease Progress Note    Requested by: dr. Ganesh Ren for possible sepsis, partially treated left hand/foot infection    Current abx Prior abx   Vancomycin since 5/4      Lines:       Assessment :    62-year-old black female with h/o IVDA ( last used IV heroin and cocaine 2  days prior to admission) admitted to [facility] on 4/12/17 with increasing left hand and foot pain.         Recent hospitalization for left foot and left hand cellulitis, abscess. Exact microbial etiology of infection not clear. Patient has been appropriately managed with I&D on 4/17/17. Cultures: strep viridans, cons s/p levofloxacin    Now admitted s/p fall, possible avulsion fracture left ulno humeral joint, low grade fevers    I agree that clinical picture is consistent with SIRS. No evidence of worsening infection of left foot on today's exam. No erythema, swelling or purulent drainage from left hand ulcer per ED report. Clinical suspicion of sepsis is low. SIRS may be due to left elbow fracture, blunt trauma to right knee/ankle. Ortho consult appreciated    Recent Increased swelling right elbow - ?cellulitis versus recent trauma related. Clinically better. Resolved right elbow swelling and erythema     Antibiotic management complicated due to  life threatening allergy to pcn (anaphylaxis 30 years ago). Non life threatening allergy to moxifloxacin. She has tolerated levofloxacin. Recommendations:     1. continue clindamycin for 2 days  2. F/u ortho recommendations  3. D/c planning per primary team.     Above plan was discussed in details with patient,  and dr Cinthia Villalta. Please call me if any further questions or concerns. Will continue to participate in the care of this patient. subjective:    improved pain both elbows. Patient denies headaches, visual disturbances, sore throat, runny nose, earaches, cp, sob, chills, cough, abdominal pain, diarrhea, burning micturition, or weakness in extremities.  she denies back pain/flank pain. .        home Medication List    Details   oxyCODONE-acetaminophen (PERCOCET) 5-325 mg per tablet Take 1 Tab by mouth every eight (8) hours as needed for Pain. Max Daily Amount: 3 Tabs. Qty: 15 Tab, Refills: 0      umeclidinium-vilanterol (ANORO ELLIPTA) 62.5-25 mcg/actuation inhaler Take 1 Puff by inhalation daily. Qty: 1 Inhaler, Refills: 5      VENTOLIN HFA 90 mcg/actuation inhaler INHALE 2 PUFFS EVERY 4 HOURS AS NEEDED  Qty: 1 Inhaler, Refills: 4      divalproex DR (DEPAKOTE) 250 mg tablet Take 250 mg by mouth nightly. furosemide (LASIX) 20 mg tablet Take 1 Tab by mouth daily. Qty: 30 Tab, Refills: 0      metOLazone (ZAROXOLYN) 5 mg tablet Take 0.5 Tabs by mouth Every Mon, Wed & Sun.  Qty: 30 Tab, Refills: 6      potassium chloride (KLOR-CON) 20 mEq packet Take 1 Packet by mouth daily. Qty: 30 Each, Refills: 0      clopidogrel (PLAVIX) 75 mg tablet Take 1 Tab by mouth daily. Qty: 30 Tab, Refills: 0      insulin glargine (LANTUS) 100 unit/mL injection 60 units subcutaneously every night  Qty: 1 Vial, Refills: 0      insulin syringe,safetyneedle 1 mL 30 gauge x 5/16\" syrg As directed  Qty: 50 Each, Refills: 0      ARIPiprazole (ABILIFY) 5 mg tablet Take 10 mg by mouth daily. Comments: take 2 tabs each morning      albuterol (PROVENTIL VENTOLIN) 2.5 mg /3 mL (0.083 %) nebulizer solution USE 1 NEB EVERY 4 HOURS FOR WHEEZING AND SHORTNESS OF BREATH  Indications: CHRONIC OBSTRUCTIVE PULMONARY DISEASE  Qty: 2 Package, Refills: 3    Associated Diagnoses: Chronic obstructive pulmonary disease, unspecified COPD type (HCC)      eplerenone (INSPRA) 25 mg tablet Take  by mouth daily. gabapentin (NEURONTIN) 300 mg capsule Take 300 mg by mouth three (3) times daily. acetaminophen (TYLENOL) 325 mg tablet Take 325 mg by mouth every six (6) hours as needed for Pain. traZODone (DESYREL) 100 mg tablet Take 100 mg by mouth nightly.       rosuvastatin (CRESTOR) 5 mg tablet Take 1 Tab by mouth nightly. Qty: 30 Tab, Refills: 6      l-methylfolate-vit b12-vit b6 (METANX) 2.8-2-25 mg Tab Take  by mouth. aspirin 81 mg tablet Take 81 mg by mouth daily. Nebulizer Accessories Kit Use with nebulizer machine  Qty: 1 Kit, Refills: prn    Associated Diagnoses: COPD (chronic obstructive pulmonary disease) (HCC)      insulin CONCENTRATED regular (U-500 CONCENTRATED) 500 unit/mL Soln 20 Units by SubCUTAneous route. With breakfast, lunch, and dinner      levothyroxine (SYNTHROID) 100 mcg tablet Take  by mouth Daily (before breakfast). sertraline (ZOLOFT) 100 mg tablet Take 50 mg by mouth daily. Oxygen-Air Delivery Systems by Nasal route continuous. 2 LNC      ergocalciferol (VITAMIN D) 50,000 unit capsule Take 50,000 Units by mouth daily.              Current Facility-Administered Medications   Medication Dose Route Frequency    [START ON 5/12/2017] eplerenone (INSPRA) tablet 25 mg  25 mg Oral DAILY    diclofenac (VOLTAREN) 1 % topical gel 2 g  2 g Topical QID PRN    insulin glargine (LANTUS) injection 65 Units  65 Units SubCUTAneous QHS    senna-docusate (PERICOLACE) 8.6-50 mg per tablet 1 Tab  1 Tab Oral DAILY    HYDROcodone-acetaminophen (NORCO) 5-325 mg per tablet 1 Tab  1 Tab Oral Q4H PRN    clindamycin (CLEOCIN) capsule 300 mg  300 mg Oral Q6H    famotidine (PEPCID) tablet 20 mg  20 mg Oral BID    acetaminophen (TYLENOL) tablet 650 mg  650 mg Oral Q6H PRN    albuterol-ipratropium (DUO-NEB) 2.5 MG-0.5 MG/3 ML  3 mL Nebulization Q4H PRN    ARIPiprazole (ABILIFY) tablet 10 mg  10 mg Oral DAILY    aspirin delayed-release tablet 81 mg  81 mg Oral DAILY    divalproex DR (DEPAKOTE) tablet 250 mg  250 mg Oral QHS    gabapentin (NEURONTIN) capsule 300 mg  300 mg Oral TID    levothyroxine (SYNTHROID) tablet 100 mcg  100 mcg Oral ACB    rosuvastatin (CRESTOR) tablet 5 mg  5 mg Oral QHS    sertraline (ZOLOFT) tablet 50 mg  50 mg Oral DAILY    traZODone (DESYREL) tablet 100 mg  100 mg Oral QHS    umeclidinium-vilanterol (ANORO ELLIPTA) 62.5 mcg- 25 mcg/inhalation  1 Puff Inhalation DAILY    sodium chloride (NS) flush 5-10 mL  5-10 mL IntraVENous Q8H    sodium chloride (NS) flush 5-10 mL  5-10 mL IntraVENous PRN    heparin (porcine) injection 5,000 Units  5,000 Units SubCUTAneous Q8H    insulin lispro (HUMALOG) injection   SubCUTAneous AC&HS       Allergies: Moxifloxacin; Pcn [penicillins]; and Sulfa (sulfonamide antibiotics)    Temp (24hrs), Av.2 °F (36.8 °C), Min:97.5 °F (36.4 °C), Max:99.1 °F (37.3 °C)    Visit Vitals    /78 (BP 1 Location: Left arm, BP Patient Position: At rest)    Pulse 74    Temp 99.1 °F (37.3 °C)    Resp 18    Ht 5' 5\" (1.651 m)    Wt 95.3 kg (210 lb)    LMP 2012    SpO2 100%    BMI 34.95 kg/m2       ROS: 12 point ROS obtained in details. Pertinent positives as mentioned in HPI,   otherwise negative    Physical Exam:    Constitutional: She is oriented to person, place, and time. She appears well-developed. No distress. Obese. Chronically ill appearing. HENT:   Head: Atraumatic. Mouth/Throat: Oropharynx is clear and moist.   Eyes: EOM are normal. Pupils are equal, round, and reactive to light. Neck: Normal range of motion. Cardiovascular: Regular rhythm and normal heart sounds. No murmur heard. Pulmonary/Chest: Effort normal. She has no rales. Bilateral air entry normal.  Abdominal: Soft. Bowel sounds are normal. There is no tenderness. There is no guarding. Musculoskeletal:   . Edema right elbow/right forearm significantly better. Rest of extremities exam unchanged  Neurological: She is alert and oriented to person, place, and time. No cranial nerve deficit. Skin: She is not diaphoretic. Psychiatric: She has a normal mood and affect. Nursing note and vitals reviewed.     Labs: Results:   Chemistry Recent Labs      05/10/17   0350  17   0325   GLU  103*  102*   NA  138  142   K  4.5  4.5   CL  103  106   CO2  28  28 BUN  19*  18   CREA  1.33*  1.31*   CA  9.5  9.1   AGAP  7  8   BUCR  14  14      CBC w/Diff Recent Labs      05/09/17   0325   WBC  8.1   RBC  3.95*   HGB  10.3*   HCT  31.7*   PLT  237   GRANS  59   LYMPH  26   EOS  5      Microbiology No results for input(s): CULT in the last 72 hours.        RADIOLOGY:    All available imaging studies/reports in [facility] for this admission were reviewed    Dr. Delio Mistry, Infectious Disease Specialist  [phone]  May 10, 2017  11:13 AM

## 2017-05-10 NOTE — DISCHARGE SUMMARY
PATIENT DISCHARGE INSTRUCTIONS      PATIENT DISCHARGE INSTRUCTIONS    Irwin Lynn Floor / [id number] : 1955    Admitted 2017 Discharged: 5/10/2017     Dictated # [id number]    · It is important that you take the medication exactly as they are prescribed. · Keep your medication in the bottles provided by the pharmacist and keep a list of the medication names, dosages, and times to be taken in your wallet. · Do not take other medications without consulting your doctor. What to do at Home    Recommended Diet: Cardiac Diet and Diabetic Diet    Recommended Activity: Activity as tolerated and PT/OT Eval and Treat    Current Discharge Medication List      START taking these medications    Details   clindamycin (CLEOCIN) 300 mg capsule Take 1 Cap by mouth every six (6) hours for 3 days. Qty: 12 Cap, Refills: 0      HEPARIN SODIUM,PORCINE (HEPARIN, PORCINE,) 5,000 unit/mL injection 1 mL by SubCUTAneous route every eight (8) hours for 14 days. Qty: 42 mL, Refills: 0      diclofenac (VOLTAREN) 1 % gel Apply 2 g to affected area four (4) times daily as needed for Pain. APPLY TO elbows/wrists/knees/ankles  Qty: 100 g, Refills: 0      famotidine (PEPCID) 20 mg tablet Take 1 Tab by mouth nightly. Qty: 30 Tab, Refills: 0      senna-docusate (PERICOLACE) 8.6-50 mg per tablet Take 1 Tab by mouth daily. Qty: 30 Tab, Refills: 0      insulin lispro (HUMALOG) 100 unit/mL injection For Blood Sugar of:  150-169 = 3 Units; 170-189=4 Units  190-209 = 5 Units; 210-229 = 6 Units  230-249 = 7 Units; 250-269 = 8 Units  270-289 = 9 Units; 290-309 = 10 Units  310-329 = 11 Units; 330-349 = 12 Units  350 or  > = Call MD.  Indications: type 2 diabetes mellitus  Qty: 1 Vial, Refills: 0         CONTINUE these medications which have CHANGED    Details   eplerenone (INSPRA) 25 mg tablet Take 1 Tab by mouth daily.   Qty: 30 Tab, Refills: 0      insulin glargine (LANTUS) 100 unit/mL injection 65 units subcutaneously every night  Qty: 1 Vial, Refills: 0      potassium chloride (KLOR-CON) 20 mEq packet Take 1 Packet by mouth Every Mon, Wed & Sun.  Qty: 30 Each, Refills: 0      ergocalciferol (VITAMIN D2) 50,000 unit capsule Take 1 Cap by mouth every seven (7) days. Qty: 4 Cap, Refills: 0         CONTINUE these medications which have NOT CHANGED    Details   VENTOLIN HFA 90 mcg/actuation inhaler INHALE 2 PUFFS EVERY 4 HOURS AS NEEDED  Qty: 1 Inhaler, Refills: 4      divalproex DR (DEPAKOTE) 250 mg tablet Take 250 mg by mouth nightly. furosemide (LASIX) 20 mg tablet Take 1 Tab by mouth daily. Qty: 30 Tab, Refills: 0      insulin syringe,safetyneedle 1 mL 30 gauge x 5/16\" syrg As directed  Qty: 50 Each, Refills: 0      ARIPiprazole (ABILIFY) 5 mg tablet Take 10 mg by mouth daily. Comments: take 2 tabs each morning      albuterol (PROVENTIL VENTOLIN) 2.5 mg /3 mL (0.083 %) nebulizer solution USE 1 NEB EVERY 4 HOURS FOR WHEEZING AND SHORTNESS OF BREATH  Indications: CHRONIC OBSTRUCTIVE PULMONARY DISEASE  Qty: 2 Package, Refills: 3    Associated Diagnoses: Chronic obstructive pulmonary disease, unspecified COPD type (HCC)      gabapentin (NEURONTIN) 300 mg capsule Take 300 mg by mouth three (3) times daily. traZODone (DESYREL) 100 mg tablet Take 100 mg by mouth nightly. rosuvastatin (CRESTOR) 5 mg tablet Take 1 Tab by mouth nightly. Qty: 30 Tab, Refills: 6      aspirin 81 mg tablet Take 81 mg by mouth daily. levothyroxine (SYNTHROID) 100 mcg tablet Take  by mouth Daily (before breakfast). sertraline (ZOLOFT) 100 mg tablet Take 50 mg by mouth daily. Oxygen-Air Delivery Systems by Nasal route continuous. 2 LNC      oxyCODONE-acetaminophen (PERCOCET) 5-325 mg per tablet Take 1 Tab by mouth every eight (8) hours as needed for Pain. Max Daily Amount: 3 Tabs. Qty: 15 Tab, Refills: 0      umeclidinium-vilanterol (ANORO ELLIPTA) 62.5-25 mcg/actuation inhaler Take 1 Puff by inhalation daily.   Qty: 1 Inhaler, Refills: 5 acetaminophen (TYLENOL) 325 mg tablet Take 325 mg by mouth every six (6) hours as needed for Pain.      l-methylfolate-vit b12-vit b6 (METANX) 2.8-2-25 mg Tab Take  by mouth.       Nebulizer Accessories Kit Use with nebulizer machine  Qty: 1 Kit, Refills: prn    Associated Diagnoses: COPD (chronic obstructive pulmonary disease) (HCC)         STOP taking these medications       clopidogrel (PLAVIX) 75 mg tablet Comments:   Reason for Stopping:         insulin CONCENTRATED regular (U-500 CONCENTRATED) 500 unit/mL Soln Comments:   Reason for Stopping:         metOLazone (ZAROXOLYN) 5 mg tablet Comments:   Reason for Stopping:               Signed By: Carolyn Russell MD     May 10, 2017

## 2017-05-10 NOTE — PROGRESS NOTES
[facility] Hospitalist Group  Progress Note    Patient: Mary Contreras Roots Age: 62 y.o. : 1955 MR#: [id number] SSN: [id number]  Date/Time: 5/10/2017     Subjective:     Feeling better. No chest or abdominal pain. No nausea or vomiting. Back pain and joint enema present. Has had 2 lpm oxygen. Objective:     VS:   Visit Vitals    /83 (BP 1 Location: Left arm, BP Patient Position: At rest)    Pulse 68    Temp 98.9 °F (37.2 °C)    Resp 18    Ht 5' 5\" (1.651 m)    Wt 95.3 kg (210 lb)    LMP 2012    SpO2 95%    BMI 34.95 kg/m2      Tmax/24hrs: Temp (24hrs), Av.4 °F (36.9 °C), Min:97.5 °F (36.4 °C), Max:99.1 °F (37.3 °C)  IOBRIEF    Intake/Output Summary (Last 24 hours) at 05/10/17 1417  Last data filed at 05/10/17 7579   Gross per 24 hour   Intake                0 ml   Output                1 ml   Net               -1 ml       General:  Alert, cooperative, no acute distress   Pulmonary:  CTA Bilaterally. No Wheezes  Cardiovascular: Regular rate and Rhythm. GI:  Soft, Non distended, Non tender. + Bowel sounds. Extremities: no pedal edema. Neurologic: follows commands     Assessment:     1. SIRS without sepsis, due to right forearm/elbow cellulitis. 2. H/O IVDA related abscess on left hand. 3. DM2 with A1c of 10.8  4. Chronic hypoxic respiratory failure [on 2 lpm] and COPD   5. Hypothyroidism  6. Polyarticular pain and stiffness  7. Hypokalemia   8. 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 9. HTN. 10. H/o Hep C     PLAN:    Cont antibiotic [po clindamycin for 2 days] per ID  Input from dr. Tobi Smith [rheumatologist] noted - Voltaren topically   PT/OT.   Replete magnesium  ARU placement pending    Case discussed with:  [x]Patient  [x]Family  []Nursing  []Case Management  DVT Prophylaxis:  []Lovenox  [x]Hep SQ  []SCDs  []Coumadin   []On Heparin gtt    Labs:    Recent Results (from the past 24 hour(s))   GLUCOSE, POC    Collection Time: 05/09/17  4:21 PM   Result Value Ref Range    Glucose (POC) 169 (H) 70 - 110 mg/dL   GLUCOSE, POC    Collection Time: 05/09/17  9:32 PM   Result Value Ref Range    Glucose (POC) 168 (H) 70 - 917 mg/dL   METABOLIC PANEL, BASIC    Collection Time: 05/10/17  3:50 AM   Result Value Ref Range    Sodium 138 136 - 145 mmol/L    Potassium 4.5 3.5 - 5.5 mmol/L    Chloride 103 100 - 108 mmol/L    CO2 28 21 - 32 mmol/L    Anion gap 7 3.0 - 18 mmol/L    Glucose 103 (H) 74 - 99 mg/dL    BUN 19 (H) 7.0 - 18 MG/DL    Creatinine 1.33 (H) 0.6 - 1.3 MG/DL    BUN/Creatinine ratio 14 12 - 20      GFR est AA 49 (L) >60 ml/min/1.73m2    GFR est non-AA 41 (L) >60 ml/min/1.73m2    Calcium 9.5 8.5 - 10.1 MG/DL   MAGNESIUM    Collection Time: 05/10/17  3:50 AM   Result Value Ref Range    Magnesium 1.6 1.6 - 2.6 mg/dL   GLUCOSE, POC    Collection Time: 05/10/17  7:34 AM   Result Value Ref Range    Glucose (POC) 103 70 - 110 mg/dL   GLUCOSE, POC    Collection Time: 05/10/17 11:33 AM   Result Value Ref Range    Glucose (POC) 108 70 - 110 mg/dL     Current Discharge Medication List      START taking these medications    Details   clindamycin (CLEOCIN) 300 mg capsule Take 1 Cap by mouth every six (6) hours for 3 days. Qty: 12 Cap, Refills: 0      HEPARIN SODIUM,PORCINE (HEPARIN, PORCINE,) 5,000 unit/mL injection 1 mL by SubCUTAneous route every eight (8) hours for 14 days. Qty: 42 mL, Refills: 0      diclofenac (VOLTAREN) 1 % gel Apply 2 g to affected area four (4) times daily as needed for Pain. APPLY TO elbows/wrists/knees/ankles  Qty: 100 g, Refills: 0      famotidine (PEPCID) 20 mg tablet Take 1 Tab by mouth nightly. Qty: 30 Tab, Refills: 0      senna-docusate (PERICOLACE) 8.6-50 mg per tablet Take 1 Tab by mouth daily.   Qty: 30 Tab, Refills: 0      insulin lispro (HUMALOG) 100 unit/mL injection For Blood Sugar of:  150-169 = 3 Units; 170-189=4 Units  190-209 = 5 Units; 210-229 = 6 Units  230-249 = 7 Units; 072-022 = 8 Units  270-289 = 9 Units; 290-309 = 10 Units  310-329 = 11 Units; 330-349 = 12 Units  350 or  > = Call MD.  Indications: type 2 diabetes mellitus  Qty: 1 Vial, Refills: 0         CONTINUE these medications which have CHANGED    Details   eplerenone (INSPRA) 25 mg tablet Take 1 Tab by mouth daily. Qty: 30 Tab, Refills: 0      insulin glargine (LANTUS) 100 unit/mL injection 65 units subcutaneously every night  Qty: 1 Vial, Refills: 0      potassium chloride (KLOR-CON) 20 mEq packet Take 1 Packet by mouth Every Mon, Wed & Sun.  Qty: 30 Each, Refills: 0         CONTINUE these medications which have NOT CHANGED    Details   VENTOLIN HFA 90 mcg/actuation inhaler INHALE 2 PUFFS EVERY 4 HOURS AS NEEDED  Qty: 1 Inhaler, Refills: 4      divalproex DR (DEPAKOTE) 250 mg tablet Take 250 mg by mouth nightly. furosemide (LASIX) 20 mg tablet Take 1 Tab by mouth daily. Qty: 30 Tab, Refills: 0      insulin syringe,safetyneedle 1 mL 30 gauge x 5/16\" syrg As directed  Qty: 50 Each, Refills: 0      ARIPiprazole (ABILIFY) 5 mg tablet Take 10 mg by mouth daily. Comments: take 2 tabs each morning      albuterol (PROVENTIL VENTOLIN) 2.5 mg /3 mL (0.083 %) nebulizer solution USE 1 NEB EVERY 4 HOURS FOR WHEEZING AND SHORTNESS OF BREATH  Indications: CHRONIC OBSTRUCTIVE PULMONARY DISEASE  Qty: 2 Package, Refills: 3    Associated Diagnoses: Chronic obstructive pulmonary disease, unspecified COPD type (HCC)      gabapentin (NEURONTIN) 300 mg capsule Take 300 mg by mouth three (3) times daily. traZODone (DESYREL) 100 mg tablet Take 100 mg by mouth nightly. rosuvastatin (CRESTOR) 5 mg tablet Take 1 Tab by mouth nightly. Qty: 30 Tab, Refills: 6      aspirin 81 mg tablet Take 81 mg by mouth daily. levothyroxine (SYNTHROID) 100 mcg tablet Take  by mouth Daily (before breakfast). sertraline (ZOLOFT) 100 mg tablet Take 50 mg by mouth daily. Oxygen-Air Delivery Systems by Nasal route continuous.  2 LNC      ergocalciferol (VITAMIN D) 50,000 unit capsule Take 50,000 Units by mouth daily. oxyCODONE-acetaminophen (PERCOCET) 5-325 mg per tablet Take 1 Tab by mouth every eight (8) hours as needed for Pain. Max Daily Amount: 3 Tabs. Qty: 15 Tab, Refills: 0      umeclidinium-vilanterol (ANORO ELLIPTA) 62.5-25 mcg/actuation inhaler Take 1 Puff by inhalation daily. Qty: 1 Inhaler, Refills: 5      acetaminophen (TYLENOL) 325 mg tablet Take 325 mg by mouth every six (6) hours as needed for Pain.      l-methylfolate-vit b12-vit b6 (METANX) 2.8-2-25 mg Tab Take  by mouth.       Nebulizer Accessories Kit Use with nebulizer machine  Qty: 1 Kit, Refills: prn    Associated Diagnoses: COPD (chronic obstructive pulmonary disease) ([facility])         STOP taking these medications       clopidogrel (PLAVIX) 75 mg tablet Comments:   Reason for Stopping:         insulin CONCENTRATED regular (U-500 CONCENTRATED) 500 unit/mL Soln Comments:   Reason for Stopping:         metOLazone (ZAROXOLYN) 5 mg tablet Comments:   Reason for Stopping:               Signed By: [place] MD     May 10, 2017

## 2017-05-10 NOTE — DISCHARGE SUMMARY
[facility]  TRANSFER SUMMARY    Name:  Adalberto Negrete  MR#:  [id number]  :  1955  Account #:  [de-identified]  Date of Adm:  2017  Date of Transfer:  05/10/2017      DISPOSITION: Discharged to acute rehabilitation. DISCHARGE CONDITION: Stable. DISCHARGE DIAGNOSES:  1. Systemic inflammatory response syndrome without sepsis, most  likely due to right forearm and elbow cellulitis, improved. 2. Diabetes mellitus. 3. Polyarticular pain and stiffness, most likely due to inflammatory joint  disease. 4. Chronic hypoxic respiratory failure on 2 L oxygen. 5. Chronic obstructive pulmonary disease. 6. Diabetes mellitus with A1c of 10.8.  7. History of intravenous drug use related abscess of the left hand in  the past.  8. Hypothyroidism. 9. Hypokalemia and hypomagnesemia. 10. 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 11. Hypertension. 12. History of hepatitis C.    DISCHARGE MEDICATIONS:  1. Clindamycin 300 mg every 6 hours for 3 days until 2017. 2. Heparin 5000 units subcu every 8 hours while the patient is in  rehabilitation. 3. Voltaren 1% gel 2 grams to apply to the joints including  elbows, wrists, knees, and ankles 4 times a day as needed. 4. Pepcid 20 mg daily. 5. Génesis-Colace 1 tablet daily, hold it for diarrhea. 6. Insulin, Humalog sliding scale. 7. Inspra 25 mg daily from 2017.  8. Lantus 65 units subcutaneous at bedtime. 9. Potassium chloride 1 packet on Monday, Wednesday and Friday. 10. Albuterol inhaler 2 puffs every 4 hours p.r.n. for  shortness of breath. 11. Depakote 250 mg at bedtime. 12. Lasix 20 mg every day. 13. Abilify 10 mg daily. 14. Albuterol nebulizer every 4 hours p.r.n. for shortness of breath. 15. Gabapentin 300 mg 3 times a day. 16. Trazodone 100 mg at bedtime. 17. Crestor 5 mg at bedtime. 18. Aspirin 81 mg daily. 19. Synthroid 100 mcg daily. 20. Zoloft 50 mg daily.   21. Oxygen 2 liters via nasal cannula. 22. Vitamin D2 50,000 units every week. 23. Percocet 5/325 every 8 hours p.r.n. for pain. 24. Anoro 1 puff daily. 25. Tylenol 325 mg every 6 hours p.r.n. for pain or fever. 26. Metanx 1 tablet daily. IMAGING AND PROCEDURES:  1. X-ray of the left elbow was done at the time of admission showed a  small possible avulsion fracture. 2. X-ray of the left wrist was done, showed no fracture. 3. X-ray of the right knee was done, showed osteonecrosis of the distal  femur and proximal tibia with degenerative joint disease. 4. X-ray of the right ankle was done, showed osteonecrosis of the  distal and proximal tibia. 5. Chest x-ray was done, negative for any acute infiltrate. 6. X-ray of the right elbow was done, negative for fracture or  dislocation. 7. CT scan of the elbow on the left side was done, showed soft tissue  swelling, most likely suggestive of olecranon bursitis. Distal aspect of  the cerebellum and distal left humerus and lesser extent of the distal  aspect of the capitellum have sclerotic changes. 8. CT scan of the right elbow was done. 9. Chest x-ray was repeated on 05/05/2017, showed interstitial  prominence. 10. Right upper extremity venous Doppler negative for DVT. 11. X-ray of the wrist on the right side, negative for acute abnormality. 12. X-ray of the right hand was negative for any acute finding. 13. Blood culture x2 remained negative. CONSULTATIONS DONE:  1. ID with Dr. Amaya Mixon. 2. Ortho with Dr. Debbie Irene. 3. Rheumatologist with Dr. Adrian Aviles. HOSPITAL COURSE: The patient was admitted to the hospital on  05/01/2017 with a complaint of fall. Please refer to hospital admission  H and P done by Dr. Alfie Soria for further details. She was complaining of  having left elbow pain. In the emergency room, she had an x-ray done  which showed possible fracture; however, the patient also had some  fever and SIRS. The patient was seen by Orthopedic service.  CT was  done, did not show any kind of fracture, other than some cellulitis and  olecranon bursitis. Infectious disease doctor also evaluated this  patient. They initially started antibiotic and then restarted her on  medications for cellulitis after ortho input was obtained. The patient will  be continued on clindamycin until 05/13/2017. She did not have any  more fever or leukocytosis afterwards. The patient also had right upper  extremity venous Doppler which was negative. Ortho was following this  patient constantly with us and recommended PT, OT evaluation and  rehabilitation placement. PT/OT saw this patient and recommended  rehab and the patient was accepted by rehabilitation. When I saw this patient first time, it looked like the patient had  polyarticular arthritis. Dr. Ritesh Giordano from Rheumatology was consulted. She  ordered a panel of rheumatological and so far it looks like the patient is leaning  toward mixed connective tissue disorder. I spoke to her on the day of  discharge and she will be following this patient over there and may  start the patient on Plaquenil. There is no role of steroid at this point for  her because of the infection. The patient will be continued on home  medications for hypothyroidism and hypertension. She also has stage  3 chronic kidney disease. Her Lantus was increased to 65 units here  for better diabetes control. The patient will be continued on nebs and  oxygen for her chronic hypoxic respiratory failure and COPD. The  patient will be transferred to rehab for further care. DISCHARGE INSTRUCTIONS:  1. DIET: ADA, cardiac, renal diet. 2. Activity as tolerated and PT, OT to follow. Rehabilitation doctor to  follow this patient. 3. Call Dr. Kevan Richardson if any orthopedic issue, otherwise follow with him in  a week. 4. Call Dr. Mitali Romero if any ID issue. 5. Please call Dr. Ritesh Giordano and let her know about the location.  She would  like to follow this patient with the rehab team.    TOTAL TIME: Greater than 35 minutes.         MD LOVE Eastman / Cornelius Moreno  D:  05/10/2017   15:28  T:  05/10/2017   16:04  Job #:  [id number]

## 2017-05-10 NOTE — PROGRESS NOTES
This pt was accepted by ARU, per pt's M.D. Dr. Austin Bowen and daughter notified of this acceptance and transfer.

## 2017-05-10 NOTE — PROGRESS NOTES
1641- Pt mag completed, IV taken out. Discharge instructions removed. Transportation at bedside to bring pt to ARU.

## 2017-05-10 NOTE — PROGRESS NOTES
CHART REVIEWED AND  PT. IS RESPONDING TO ANTIBIOTIC RX. PER ID. RT. WRIST X-RAY REVEALED  GOOD MINERALIZATION, AND NO ACUTE CHANGES. RHEUM. SEROLOGY IS PENDING. IMPRESSIONS- UNCHANGED  WILL ADD VOLTAREN GEL 1 % 2-4 GRAMS Q 6 HRS TO EXTREMITIES.     Gerard Kuhn M.D.,F.A.C.F.  87-

## 2017-05-10 NOTE — PROGRESS NOTES
4 eye skin assessment completed with Edgar Trujillo LPN. Jorge Alberto Hunter RN. Documented in doc flow sheet.

## 2017-05-10 NOTE — PROGRESS NOTES
1555- Called report to ARU, Pt receiving MAG sulfate. When pt finishes mag replacement will be transported to ARU.

## 2017-05-10 NOTE — IP AVS SNAPSHOT
Current Discharge Medication List  
  
START taking these medications Dose & Instructions Dispensing Information Comments Morning Noon Evening Bedtime  
 aspirin 81 mg chewable tablet Your last dose was: Your next dose is:    
   
   
 Dose:  81 mg Take 1 Tab by mouth daily (with breakfast). Indications: prevention of cerebrovascular accident Quantity:  15 Tab Refills:  0  
     
   
   
   
  
 cholecalciferol 1,000 unit tablet Commonly known as:  VITAMIN D3 Your last dose was: Your next dose is:    
   
   
 Dose:  2000 Units Take 2 Tabs by mouth daily. Indications: PREVENTION OF VITAMIN D DEFICIENCY Quantity:  30 Tab Refills:  0  
     
   
   
   
  
 famotidine 20 mg tablet Commonly known as:  PEPCID Your last dose was: Your next dose is:    
   
   
 Dose:  20 mg Take 1 Tab by mouth daily. Indications: PREVENTION OF STRESS ULCER Quantity:  15 Tab Refills:  0  
     
   
   
   
  
 febuxostat 40 mg Tab tablet Commonly known as:  Lordina Colonia Your last dose was: Your next dose is:    
   
   
 Dose:  40 mg Take 1 Tab by mouth daily. Indications: GOUT PREVENTION Quantity:  15 Tab Refills:  0  
     
   
   
   
  
 magnesium oxide 400 mg tablet Commonly known as:  MAG-OX Your last dose was: Your next dose is:    
   
   
 Dose:  400 mg Take 1 Tab by mouth two (2) times a day for 7 days. Indications: HYPOMAGNESEMIA Quantity:  14 Tab Refills:  0  
     
   
   
   
  
 tolterodine 1 mg tablet Commonly known as:  Marvetta Cam Your last dose was: Your next dose is:    
   
   
 Dose:  1 mg Take 1 Tab by mouth two (2) times a day. Indications: URINARY URGE INCONTINENCE Quantity:  30 Tab Refills:  0 CONTINUE these medications which have CHANGED Dose & Instructions Dispensing Information Comments Morning Noon Evening Bedtime insulin glargine 100 unit/mL injection Commonly known as:  LANTUS What changed:  additional instructions Your last dose was: Your next dose is:    
   
   
 Inject 70 units subcutaneously before breakfast (7AM) and 40 units subcutaneously after dinner (7PM). Indications: type 2 diabetes mellitus Quantity:  1 Vial  
Refills:  0  
     
   
   
   
  
 insulin lispro 100 unit/mL injection Commonly known as:  HUMALOG What changed:  additional instructions Your last dose was: Your next dose is: To be given 15 min before meals: < 150 - None, 151-200 - 2 u, 201-250 - 4 u, 251-300 - 6 u, 301-350 - 8 u, 351-400 - 10 u, >400 - 12 u Quantity:  1 Vial  
Refills:  0  
     
   
   
   
  
 levothyroxine 100 mcg tablet Commonly known as:  SYNTHROID What changed:  how much to take Your last dose was: Your next dose is:    
   
   
 Dose:  100 mcg Take 1 Tab by mouth Daily (before breakfast). Indications: hypothyroidism Quantity:  15 Tab Refills:  0  
     
   
   
   
  
 oxyCODONE-acetaminophen 5-325 mg per tablet Commonly known as:  PERCOCET What changed:  when to take this Your last dose was: Your next dose is:    
   
   
 Dose:  1 Tab Take 1 Tab by mouth every six (6) hours as needed for Pain. Max Daily Amount: 4 Tabs. Indications: Pain Quantity:  15 Tab Refills:  0 CONTINUE these medications which have NOT CHANGED Dose & Instructions Dispensing Information Comments Morning Noon Evening Bedtime ABILIFY 5 mg tablet Generic drug:  ARIPiprazole Your last dose was: Your next dose is:    
   
   
 Dose:  10 mg Take 10 mg by mouth daily. Indications: BIPOLAR DISORDER IN REMISSION Refills:  0  
 take 2 tabs each morning * albuterol 2.5 mg /3 mL (0.083 %) nebulizer solution Commonly known as:  PROVENTIL VENTOLIN Your last dose was: Your next dose is:    
   
   
 USE 1 NEB EVERY 4 HOURS FOR WHEEZING AND SHORTNESS OF BREATH  Indications: CHRONIC OBSTRUCTIVE PULMONARY DISEASE Quantity:  2 Package Refills:  3  
     
   
   
   
  
 * VENTOLIN HFA 90 mcg/actuation inhaler Generic drug:  albuterol Your last dose was: Your next dose is:    
   
   
 INHALE 2 PUFFS EVERY 4 HOURS AS NEEDED Quantity:  1 Inhaler Refills:  4 DEPAKOTE 250 mg tablet Generic drug:  divalproex DR Your last dose was: Your next dose is:    
   
   
 Dose:  250 mg Take 250 mg by mouth nightly. Indications: BIPOLAR DISORDER IN REMISSION Refills:  0  
     
   
   
   
  
 insulin syringe,safetyneedle 1 mL 30 gauge x 5/16\" Syrg Your last dose was: Your next dose is: As directed Quantity:  50 Each Refills:  0 METANX 2.8-2-25 mg Tab Generic drug:  l-methylfolate-vit b12-vit b6 Your last dose was: Your next dose is:    
   
   
 Dose:  1 Tab Take 1 Tab by mouth daily. Refills:  0 Nebulizer Accessories Kit Your last dose was: Your next dose is:    
   
   
 Use with nebulizer machine Quantity:  1 Kit Refills:  prn NEURONTIN 300 mg capsule Generic drug:  gabapentin Your last dose was: Your next dose is:    
   
   
 Dose:  300 mg Take 300 mg by mouth three (3) times daily. Indications: NEUROPATHIC PAIN Refills:  0 Oxygen-Air Delivery Systems Your last dose was: Your next dose is:    
   
   
 by Nasal route continuous. 2 LPM via NC  Indications: Hypoxemia requiring supplementary oxygen Refills:  0  
     
   
   
   
  
 rosuvastatin 5 mg tablet Commonly known as:  CRESTOR Your last dose was: Your next dose is:    
   
   
 Dose:  5 mg Take 1 Tab by mouth nightly. Quantity:  30 Tab Refills:  6  
     
   
   
   
  
 traZODone 100 mg tablet Commonly known as:  Colin Fernandes Your last dose was: Your next dose is:    
   
   
 Dose:  100 mg Take 100 mg by mouth nightly. Indications: major depressive disorder Refills:  0  
     
   
   
   
  
 TYLENOL 325 mg tablet Generic drug:  acetaminophen Your last dose was: Your next dose is:    
   
   
 Dose:  325 mg Take 325 mg by mouth every six (6) hours as needed for Pain. Indications: Fever, Pain Refills:  0  
     
   
   
   
  
 umeclidinium-vilanterol 62.5-25 mcg/actuation inhaler Commonly known as:  Sukh Borges Your last dose was: Your next dose is:    
   
   
 Dose:  1 Puff Take 1 Puff by inhalation daily. Quantity:  1 Inhaler Refills:  5 ZOLOFT 100 mg tablet Generic drug:  sertraline Your last dose was: Your next dose is:    
   
   
 Dose:  50 mg Take 50 mg by mouth daily. Indications: DEPRESSION ASSOCIATED WITH BIPOLAR DISORDER Refills:  0  
     
   
   
   
  
 * Notice: This list has 2 medication(s) that are the same as other medications prescribed for you. Read the directions carefully, and ask your doctor or other care provider to review them with you. STOP taking these medications   
 eplerenone 25 mg tablet Commonly known as:  INSPRA  
   
  
 ergocalciferol 50,000 unit capsule Commonly known as:  VITAMIN D2  
   
  
 furosemide 20 mg tablet Commonly known as:  LASIX  
   
  
 insulin CONCENTRATED regular 500 unit/mL Soln Commonly known as:  U-500 CONCENTRATED  
   
  
 potassium chloride 20 mEq packet Commonly known as:  KLOR-CON Where to Get Your Medications These medications were sent to [facility] - [facility], [address]  [address] [facility], [address] [place] [address] Phone:  [phone]  
  aspirin 81 mg chewable tablet  
 cholecalciferol 1,000 unit tablet famotidine 20 mg tablet  
 febuxostat 40 mg Tab tablet  
 insulin glargine 100 unit/mL injection  
 insulin lispro 100 unit/mL injection  
 levothyroxine 100 mcg tablet  
 magnesium oxide 400 mg tablet  
 tolterodine 1 mg tablet Information on where to get these meds will be given to you by the nurse or doctor. ! Ask your nurse or doctor about these medications  
  oxyCODONE-acetaminophen 5-325 mg per tablet

## 2017-05-10 NOTE — ROUTINE PROCESS
Bedside and Verbal shift change report given to [address] [place] [address] (oncoming nurse) by Amilcar Mcclendon RN (offgoing nurse). Report included the following information SBAR, Kardex, MAR and Recent Results. SITUATION:    Code Status: Full Code   Reason for Admission: SIRS (systemic inflammatory response syndrome) ([facility]    [facility] day: 8   Problem List:       Hospital Problems  Date Reviewed: 4/17/2017          Codes Class Noted POA    Cellulitis of right elbow ICD-10-CM: W00.035  ICD-9-CM: 682.3  5/4/2017 Yes        Olecranon bursitis of right elbow ICD-10-CM: M70.21  ICD-9-CM: 726.33  5/4/2017 Yes        Contusion of elbow ICD-10-CM: S50.00XA  ICD-9-CM: 923.11  5/4/2017 Yes        SIRS (systemic inflammatory response syndrome) ([facility] ICD-10-CM: R65.10  ICD-9-CM: 995.90  5/2/2017 Unknown              BACKGROUND:    Past Medical History:   Past Medical History:   Diagnosis Date    Anxiety     Asthma     Back injury 1986    jumped out of second story window    Behavioral change     Bilateral knee pain     Bipolar disorder ([facility]     Chronic airway obstruction, not elsewhere classified     SOB, on paula O2 Recent admission with psychosis    Chronic back pain     Chronic diastolic heart failure (HCC)     Stable on diuretics    Chronic kidney disease     stage III    Congestive heart failure (HCC)     COPD (chronic obstructive pulmonary disease) ([facility] 10/31/2012    Cramps, muscle, general     Depression     Diabetes mellitus     Difficulty speaking     Difficulty walking     Difficulty writing     DJD (degenerative joint disease) of knee     bilateral, mild    Edema     The edema involves both lower extremities. The episodes last for 1 week. The patient describes this as worsening. Symptoms are exacerbated by prolonged sitting. Symptoms are relieved by leg elevation and diuretics. NO CHANGE    Essential hypertension, benign     Better controlled    Falls frequently     Fatigue     Generalized stiffness     Headache     Heartburn     Hiatal hernia     History of hepatitis C     treated    Hoarseness of voice     Hypothyroidism     Lung disease     Memory difficulty     Morbid obesity (HCC)     Multiple joint pain     Nausea     Numbness and tingling     arms and legs, related to diabetic neuropathy    Osteoarthritis of left knee     Other and unspecified hyperlipidemia     LDL reportedly very high/started on crestor recently by diabetic doctor    Oxygen dependent     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     Sarcoidosis ([facility]     Sickle cell trait ([facility]     Sinusitis     SOB (shortness of breath)     STD (female)     herpes    Swelling of body region     legs and feet    Tinnitus     Venous insufficiency     Vertigo     Weakness generalized     Wears glasses     Weight gain          Patient taking anticoagulants yes     ASSESSMENT:    Changes in Assessment Throughout Shift: none     Patient has Central Line: no Reasons if yes: none   Patient has Renae Cath: no Reasons if yes: n/a      Last Vitals:     Vitals:    05/09/17 1128 05/09/17 1620 05/09/17 2009 05/10/17 0049   BP: 131/75 147/87 150/85 (!) 156/92   Pulse: 65 60 67 71   Resp: 18 18 20 18   Temp: 97.7 °F (36.5 °C) 97.5 °F (36.4 °C) 97.7 °F (36.5 °C) 98.5 °F (36.9 °C)   SpO2: 100% 95% 98% 99%   Weight:       Height:       LMP: 11/25/2012        IV and DRAINS (will only show if present)   [REMOVED] Peripheral IV 05/02/17 Right Wrist-Site Assessment: Clean, dry, & intact  [REMOVED] Peripheral IV 05/05/17 Right Forearm-Site Assessment: Clean, dry, & intact  [REMOVED] Peripheral IV 05/01/17 Right Forearm-Site Assessment: Clean, dry, & intact  [REMOVED] Peripheral IV 05/01/17 Right Hand-Site Assessment: Clean, dry, & intact     WOUND (if present)   Wound Type: Incision and drainage   Dressing present Dressing Present : No   Wound Concerns/Notes:  Wound mgt.      PAIN    Pain Assessment    Pain Intensity 1: 5 (05/09/17 8050)    Pain Location 1: Arm, Knee    Pain Intervention(s) 1: Medication (see MAR)    Patient Stated Pain Goal: 0  o Interventions for Pain:  Pain mgt.  o Intervention effective: yes  o Time of last intervention: 05/10/17   o Reassessment Completed: yes      Last 3 Weights:  Last 3 Recorded Weights in this Encounter    05/01/17 2213 05/02/17 2241   Weight: 95.3 kg (210 lb) 95.3 kg (210 lb)     Weight change:      INTAKE/OUPUT    Current Shift:      Last three shifts: 05/08 0701 - 05/09 1900  In: 880 [P.O.:880]  Out: 900 [Urine:900]     LAB RESULTS     Recent Labs      05/09/17   0325   WBC  8.1   HGB  10.3*   HCT  31.7*   PLT  237        Recent Labs      05/09/17   0325   NA  142   K  4.5   GLU  102*   BUN  18   CREA  1.31*   CA  9.1   MG  1.6       RECOMMENDATIONS AND DISCHARGE PLANNING     1. Pending tests/procedures/ Plan of Care or Other Needs: follow up labs     2. Discharge plan for patient and Needs/Barriers: PT & OT    3. Estimated Discharge Date: 5/12/17 Posted on Whiteboard in [facility] Room: yes      4. The patient's care plan was reviewed with the oncoming nurse. \"HEALS\" SAFETY CHECK      Fall Risk    Total Score: 3    Safety Measures: Safety Measures: Bed/Chair-Wheels locked, Bed in low position, Call light within reach    A safety check occurred in the patient's room between off going nurse and oncoming nurse listed above.     The safety check included the below items  Area Items   H  High Alert Medications - Verify all high alert medication drips (heparin, PCA, etc.)   E  Equipment - Suction is set up for ALL patients (with yanker)  - Red plugs utilized for all equipment (IV pumps, etc.)  - WOWs wiped down at end of shift.  - Room stocked with oxygen, suction, and other unit-specific supplies   A  Alarms - Bed alarm is set for fall risk patients  - Ensure chair alarm is in place and activated if patient is up in a chair   L  Lines - Check IV for any infiltration  - Renae bag is empty if patient has a Renae   - Tubing and IV bags are labeled   S  Safety   - Room is clean, patient is clean, and equipment is clean. - Hallways are clear from equipment besides carts. - Fall bracelet on for fall risk patients  - Ensure room is clear and free of clutter  - Suction is set up for ALL patients (with yanker)  - Hallways are clear from equipment besides carts.    - Isolation precautions followed, supplies available outside room, sign posted     Jerilyn Burnette RN

## 2017-05-10 NOTE — PROGRESS NOTES
ARU/IPR REFERRAL CONTACT NOTE  [id number] [place] [address] for Physical Rehabilitation          RE:  Nona Rae     Thank you for the opportunity to review this patient's case for admission to [address] [place] [address] for Physical Rehabilitation. Based on our pre-admission screening patient meets criteria for admission to [facility] for Physical Rehabilitation. Plan for admission today to room _182_____ at __4pm_____. Will need d/c summary/med rec completed and report called to [phone] prior to patient's arrival.    Again, Thank you for this referral. Should you have any questions please do not hesitate to call. Sincerely,  Ute Garibay.  [facility] for Physical Rehabilitation  [phone]

## 2017-05-10 NOTE — PROGRESS NOTES
CHART REVIEWED AND  AND PT. HAS POSITIVE ANTI-RNP OF 4.3. JOY NEG., CREAT. 1.33, K+ 5.5. 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       Tanja Oreilly M.D.,F.A.C.R.  28-

## 2017-05-11 PROBLEM — N18.31 CKD STAGE G3A/A1, GFR 45-59 AND ALBUMIN CREATININE RATIO <30 MG/G (HCC): Chronic | Status: ACTIVE | Noted: 2017-05-11

## 2017-05-11 LAB
25(OH)D3 SERPL-MCNC: 62.4 NG/ML (ref 30–100)
ANION GAP BLD CALC-SCNC: 5 MMOL/L (ref 3–18)
BASOPHILS # BLD AUTO: 0 K/UL (ref 0–0.06)
BASOPHILS # BLD: 0 % (ref 0–2)
BUN SERPL-MCNC: 16 MG/DL (ref 7–18)
BUN/CREAT SERPL: 13 (ref 12–20)
CALCIUM SERPL-MCNC: 10.4 MG/DL (ref 8.5–10.1)
CHLORIDE SERPL-SCNC: 102 MMOL/L (ref 100–108)
CO2 SERPL-SCNC: 32 MMOL/L (ref 21–32)
CREAT SERPL-MCNC: 1.26 MG/DL (ref 0.6–1.3)
CREAT UR-MCNC: 90 MG/DL (ref 30–125)
DIFFERENTIAL METHOD BLD: ABNORMAL
EOSINOPHIL # BLD: 0.2 K/UL (ref 0–0.4)
EOSINOPHIL NFR BLD: 3 % (ref 0–5)
ERYTHROCYTE [DISTWIDTH] IN BLOOD BY AUTOMATED COUNT: 14 % (ref 11.6–14.5)
GLUCOSE BLD STRIP.AUTO-MCNC: 124 MG/DL (ref 70–110)
GLUCOSE BLD STRIP.AUTO-MCNC: 136 MG/DL (ref 70–110)
GLUCOSE BLD STRIP.AUTO-MCNC: 173 MG/DL (ref 70–110)
GLUCOSE BLD STRIP.AUTO-MCNC: 174 MG/DL (ref 70–110)
GLUCOSE SERPL-MCNC: 111 MG/DL (ref 74–99)
HCT VFR BLD AUTO: 33.1 % (ref 35–45)
HGB BLD-MCNC: 10.7 G/DL (ref 12–16)
LYMPHOCYTES # BLD AUTO: 24 % (ref 21–52)
LYMPHOCYTES # BLD: 2.1 K/UL (ref 0.9–3.6)
MAGNESIUM SERPL-MCNC: 1.6 MG/DL (ref 1.6–2.6)
MCH RBC QN AUTO: 26 PG (ref 24–34)
MCHC RBC AUTO-ENTMCNC: 32.3 G/DL (ref 31–37)
MCV RBC AUTO: 80.5 FL (ref 74–97)
MICROALBUMIN UR-MCNC: 0.84 MG/DL (ref 0–3)
MICROALBUMIN/CREAT UR-RTO: 9 MG/G (ref 0–30)
MONOCYTES # BLD: 0.9 K/UL (ref 0.05–1.2)
MONOCYTES NFR BLD AUTO: 11 % (ref 3–10)
NEUTS SEG # BLD: 5.6 K/UL (ref 1.8–8)
NEUTS SEG NFR BLD AUTO: 62 % (ref 40–73)
PLATELET # BLD AUTO: 250 K/UL (ref 135–420)
PMV BLD AUTO: 9.8 FL (ref 9.2–11.8)
POTASSIUM SERPL-SCNC: 4.9 MMOL/L (ref 3.5–5.5)
RBC # BLD AUTO: 4.11 M/UL (ref 4.2–5.3)
SODIUM SERPL-SCNC: 139 MMOL/L (ref 136–145)
WBC # BLD AUTO: 9 K/UL (ref 4.6–13.2)

## 2017-05-11 PROCEDURE — 97542 WHEELCHAIR MNGMENT TRAINING: CPT

## 2017-05-11 PROCEDURE — 36415 COLL VENOUS BLD VENIPUNCTURE: CPT | Performed by: INTERNAL MEDICINE

## 2017-05-11 PROCEDURE — 82043 UR ALBUMIN QUANTITATIVE: CPT | Performed by: INTERNAL MEDICINE

## 2017-05-11 PROCEDURE — 65310000000 HC RM PRIVATE REHAB

## 2017-05-11 PROCEDURE — 82962 GLUCOSE BLOOD TEST: CPT

## 2017-05-11 PROCEDURE — 97110 THERAPEUTIC EXERCISES: CPT

## 2017-05-11 PROCEDURE — 83735 ASSAY OF MAGNESIUM: CPT | Performed by: INTERNAL MEDICINE

## 2017-05-11 PROCEDURE — 97166 OT EVAL MOD COMPLEX 45 MIN: CPT

## 2017-05-11 PROCEDURE — 80048 BASIC METABOLIC PNL TOTAL CA: CPT | Performed by: INTERNAL MEDICINE

## 2017-05-11 PROCEDURE — 97530 THERAPEUTIC ACTIVITIES: CPT

## 2017-05-11 PROCEDURE — 82306 VITAMIN D 25 HYDROXY: CPT | Performed by: INTERNAL MEDICINE

## 2017-05-11 PROCEDURE — 74011636637 HC RX REV CODE- 636/637: Performed by: INTERNAL MEDICINE

## 2017-05-11 PROCEDURE — 97535 SELF CARE MNGMENT TRAINING: CPT

## 2017-05-11 PROCEDURE — 97162 PT EVAL MOD COMPLEX 30 MIN: CPT

## 2017-05-11 PROCEDURE — 74011250636 HC RX REV CODE- 250/636: Performed by: INTERNAL MEDICINE

## 2017-05-11 PROCEDURE — 85025 COMPLETE CBC W/AUTO DIFF WBC: CPT | Performed by: INTERNAL MEDICINE

## 2017-05-11 PROCEDURE — 74011250637 HC RX REV CODE- 250/637: Performed by: INTERNAL MEDICINE

## 2017-05-11 RX ORDER — LANOLIN ALCOHOL/MO/W.PET/CERES
400 CREAM (GRAM) TOPICAL 2 TIMES DAILY
Status: DISCONTINUED | OUTPATIENT
Start: 2017-05-11 | End: 2017-05-22

## 2017-05-11 RX ORDER — MELATONIN
1000 DAILY
Status: DISCONTINUED | OUTPATIENT
Start: 2017-05-12 | End: 2017-05-31 | Stop reason: HOSPADM

## 2017-05-11 RX ORDER — NAPROXEN 250 MG/1
375 TABLET ORAL 2 TIMES DAILY WITH MEALS
Status: DISCONTINUED | OUTPATIENT
Start: 2017-05-11 | End: 2017-05-15

## 2017-05-11 RX ADMIN — LEVOTHYROXINE SODIUM 100 MCG: 100 TABLET ORAL at 06:31

## 2017-05-11 RX ADMIN — ROSUVASTATIN CALCIUM 5 MG: 5 TABLET ORAL at 22:04

## 2017-05-11 RX ADMIN — CLINDAMYCIN HYDROCHLORIDE 300 MG: 150 CAPSULE ORAL at 01:57

## 2017-05-11 RX ADMIN — HEPARIN SODIUM 5000 UNITS: 5000 INJECTION, SOLUTION INTRAVENOUS; SUBCUTANEOUS at 13:35

## 2017-05-11 RX ADMIN — Medication 400 MG: at 18:12

## 2017-05-11 RX ADMIN — ASPIRIN 81 MG CHEWABLE TABLET 81 MG: 81 TABLET CHEWABLE at 09:29

## 2017-05-11 RX ADMIN — DICLOFENAC SODIUM 2 G: 10 GEL TOPICAL at 13:39

## 2017-05-11 RX ADMIN — HYDROCODONE BITARTRATE AND ACETAMINOPHEN 1 TABLET: 5; 325 TABLET ORAL at 10:13

## 2017-05-11 RX ADMIN — HYDROCODONE BITARTRATE AND ACETAMINOPHEN 1 TABLET: 5; 325 TABLET ORAL at 06:31

## 2017-05-11 RX ADMIN — TRAZODONE HYDROCHLORIDE 100 MG: 50 TABLET ORAL at 22:06

## 2017-05-11 RX ADMIN — NEPHROCAP 1 CAPSULE: 1 CAP ORAL at 09:29

## 2017-05-11 RX ADMIN — DOCUSATE SODIUM 100 MG: 100 CAPSULE, LIQUID FILLED ORAL at 09:29

## 2017-05-11 RX ADMIN — INSULIN LISPRO 2 UNITS: 100 INJECTION, SOLUTION INTRAVENOUS; SUBCUTANEOUS at 11:44

## 2017-05-11 RX ADMIN — DIVALPROEX SODIUM 250 MG: 250 TABLET, DELAYED RELEASE ORAL at 22:05

## 2017-05-11 RX ADMIN — HEPARIN SODIUM 5000 UNITS: 5000 INJECTION, SOLUTION INTRAVENOUS; SUBCUTANEOUS at 06:26

## 2017-05-11 RX ADMIN — HEPARIN SODIUM 5000 UNITS: 5000 INJECTION, SOLUTION INTRAVENOUS; SUBCUTANEOUS at 22:13

## 2017-05-11 RX ADMIN — DOCUSATE SODIUM 100 MG: 100 CAPSULE, LIQUID FILLED ORAL at 18:12

## 2017-05-11 RX ADMIN — SERTRALINE HYDROCHLORIDE 50 MG: 50 TABLET ORAL at 09:29

## 2017-05-11 RX ADMIN — INSULIN GLARGINE 65 UNITS: 100 INJECTION, SOLUTION SUBCUTANEOUS at 22:05

## 2017-05-11 RX ADMIN — CLINDAMYCIN HYDROCHLORIDE 300 MG: 150 CAPSULE ORAL at 18:12

## 2017-05-11 RX ADMIN — FAMOTIDINE 20 MG: 20 TABLET ORAL at 18:12

## 2017-05-11 RX ADMIN — GABAPENTIN 300 MG: 300 CAPSULE ORAL at 13:36

## 2017-05-11 RX ADMIN — ARIPIPRAZOLE 10 MG: 10 TABLET ORAL at 09:29

## 2017-05-11 RX ADMIN — HYDROCODONE BITARTRATE AND ACETAMINOPHEN 1 TABLET: 5; 325 TABLET ORAL at 13:36

## 2017-05-11 RX ADMIN — GABAPENTIN 300 MG: 300 CAPSULE ORAL at 06:26

## 2017-05-11 RX ADMIN — FAMOTIDINE 20 MG: 20 TABLET ORAL at 10:13

## 2017-05-11 RX ADMIN — CLINDAMYCIN HYDROCHLORIDE 300 MG: 150 CAPSULE ORAL at 06:26

## 2017-05-11 RX ADMIN — DICLOFENAC SODIUM 2 G: 10 GEL TOPICAL at 09:22

## 2017-05-11 RX ADMIN — Medication 400 MG: at 13:36

## 2017-05-11 RX ADMIN — GABAPENTIN 300 MG: 300 CAPSULE ORAL at 22:13

## 2017-05-11 RX ADMIN — CLINDAMYCIN HYDROCHLORIDE 300 MG: 150 CAPSULE ORAL at 11:40

## 2017-05-11 RX ADMIN — NAPROXEN 375 MG: 250 TABLET ORAL at 18:12

## 2017-05-11 NOTE — H&P
[facility] PHYSICAL REHABILITATION  [address] [place] [address], [address]     INPATIENT REHABILITATION  HISTORY AND PHYSICAL  (Post Admission Physician Evaluation)    Name: Yumiko Sinha CSN: [id number]   Age: 62 y.o. MRN: [id number]   Sex: female Admit Date: 5/10/2017     PCP: Dr. Margie Klein      Primary Rehab Impairment Category (MARISSA): Miscellaneous    Impairment Group Label: Debility    Etiologic Diagnosis: Systemic inflammatory response syndrome (SIRS) secondary to:  1. Cellulitis of the right forearm  2. Olecranon bursitis of the right elbow  3. Contusion of left elbow  4. Right Achilles tendonitis      Subjective:     Patient seen and examined. History of the Present Illness: The patient is a 52-year-old, right-handed, Black female with history of intravenous drug abuse and multiple medical comorbidities who was admitted to [facility] on 5/2/2017 due to increasing left hand and foot pain. The patient was recently admitted to [facility] on 4/12/2017 due to pain and swelling of the left hand and left foot. The patient underwent an incision and drainage of the abscess of the left hand and the left foot on 4/17/2017 done by Dr. Clau Nation. Tr Buckley. The patient was discharged on 4/20/2017 on oral Levofloxacin. The patient was apparently well until about 5 days prior to admission, the patient had a fall and she landed on her left elbow. She had been having left elbow pain and swelling prompting the patient to go to the [facility] Emergency Department on 5/1/2017 for further evaluation. The patient was found to have a fever at the ED. WBC count was 10. 3. X-ray of the left elbow showed a small possible avulsion fracture from the ulnohumeral articulation. X-ray of the left wrist showed no evidence of acute fracture or dislocation; diffuse subcutaneous edema.  X-ray of the right knee showed osteonecrosis of the distal femur and proximal tibia; moderate degenerative changes and a small joint effusion. X-ray of the right ankle showed likely osteonecrosis in the distal and proximal tibia. The patient was empirically started on Vancomycin and Levofloxacin. The patient was admitted under the service of the [facility] (Dr. Anibal Birch). Infectious Disease consult (Dr. Baron Chaudhry) was called for evaluation and comanagement. Vancomycin and Levofloxacin were discontinued on 5/4/2017. Orthopedics consult (Dr. Kalen Medina) was called for evaluation and comanagement. X-ray of the right elbow showed no acute fracture or dislocation. CT scan of the left elbow showed no evidence of fracture or healing fracture at left elbow; particularly, no fracture at the ulnar coronoid process; minimal, early osteoarthritic changes with subchondral sclerosis at left elbow; moderate effusion in left elbow joint; moderately prominent olecranon bursa with suspected olecranon bursitis; again, there is focal soft tissue density at the dorsal aspect of the olecranon process of left ulna suggestive of olecranon bursitis; there are also evidences of soft tissue edema and cellulitis cellulitis in the subcutaneous soft tissues of dorsal aspect of proximal and mid left forearm; mild edema, with or without cellulitis can be identified in the subcutaneous tissues of distal portion of left arm and at the lateral and medial aspect of forearm; at the distal aspect of cerebellum of distal left humerus and to a lesser extent in the distal aspect of capitellum, there are subchondral sclerotic changes, most likely to be early osteoarthritic changes; differential diagnoses may include focal avascular necrosis with sclerotic changes; no deformity of the articular surface.  CT scan of the right elbow showed posterior subcutaneous cellulitis from the upper elbow to mid forearm; no large fluid collection identified; no joint effusion; no bony erosion; heterogeneous sclerosis at the capitellum, developing osteonecrosis possible. Venous duplex ultrasound of the right upper extremity showed no evidence of deep venous thrombosis detected in the veins visualized. Rheumatology consult (Dr. Marycruz Wong) was called for evaluation and comanagement. X-ray of the right wrist showed no acute diagnostic abnormality. X-ray of the right hand showed no acute diagnostic abnormality. Impression of Dr. Princess Elliott is inflammatory polyarthritis. Patient was started on oral Clindamycin on 5/8/2017. Anti-RNP was 4.3. JOY was negative. Impression of Dr. Princess Elliott was early mixed connective tissue disease. The patient had remained hemodynamically stable but due to the above events, the patient was noted to be generally weak and with impaired mobility and ADLs. Patient was felt to be a good candidate for acute inpatient rehabilitation. Upon evaluation by Physical Therapy and Occupational Therapy, the patient was recommended for acute inpatient rehabilitation. The patient was discharged and was subsequently admitted to the [facility] for intensive rehabilitation to help recover strength, function and mobility.       Past Medical History:  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 congestive heart failure     Better controlled     Bipolar affective disorder ([facility] [address] 12/5/2012    Cellulitis of right forearm 5/4/2017    Chronic back pain     Chronic obstructive pulmonary disease (COPD) (HCC)     SOB, on paula O2 Recent admission with psychosis     CKD (chronic kidney disease) stage 3, GFR 30-59 ml/min     Contusion of left elbow 5/4/2017    Depression     Diabetic neuropathy associated with type 2 diabetes mellitus ([facility] [address]     Diastolic dysfunction without heart failure     Stable on diuretics     Falls frequently     Gastroesophageal reflux disease with hiatal hernia     Generalized osteoarthritis of multiple sites     History of acute renal failure 2013    History of back injury     jumped out of second story window     History of hepatitis C     treated    History of penicillin allergy     Hypothyroidism     Hypoxemia requiring supplemental oxygen 2014    Intravenous drug user 2017    Memory difficulty     Mixed connective tissue disease ([facility] [address] 5/10/2017    Obesity, Class I, BMI 30-34.9     Olecranon bursitis of right elbow 2017    Recurrent genital herpes 2013    Right Achilles tendinitis 2017    Sarcoidosis ([facility] [address]     Sickle cell trait ([facility] [address]     Type 2 diabetes with stage 3 chronic kidney disease GFR 30-59 ([facility] [address]     Venous insufficiency     Wears glasses        Past Surgical History:  Past Surgical History:   Procedure Laterality Date    HX BACK SURGERY      HX  SECTION      HX CYST REMOVAL Left     Left foot    HX OTHER SURGICAL Left 2017    S/P incision and drainage of the abscess of the left hand and the left foot (2017 - Dr. Oli Mcclure. Gabriela Montalvo)    HX TUBAL LIGATION         Allergies: Allergies   Allergen Reactions    Moxifloxacin Rash    Pcn [Penicillins] Swelling    Sulfa (Sulfonamide Antibiotics) Swelling       Social History: The patient is single, lives alone in a [place] [address] unit with a 5-step entry in [place], [place]. She smokes about 1/2 pack of cigarettes per day. She denies any alcohol use. She admits to using intravenous heroin and cocaine (last use reported was ~2 months ago). She is on disability. Family History: Both parents are .   Family History   Problem Relation Age of Onset    Seizures Other     Diabetes Mother     Hypertension Mother     Heart Disease Mother     Cancer Mother  Diabetes Father     Stroke Father     Heart Disease Father     Headache Sister     Depression Neg Hx     Suicide Neg Hx     Psychotic Disorder Neg Hx     Substance Abuse Neg Hx     Dementia Neg Hx        Transfer Medications (from the transfer summary prepared by Dr. Lita Wolff):    Prior to Admission Medications   Prescriptions Last Dose Informant Patient Reported? Taking? ARIPiprazole (ABILIFY) 5 mg tablet 5/10/2017 at 0833  Yes Yes   Sig: Take 10 mg by mouth daily. HEPARIN SODIUM,PORCINE (HEPARIN, PORCINE,) 5,000 unit/mL injection 5/10/2017 at 1134  No Yes   Si mL by SubCUTAneous route every eight (8) hours for 14 days. Nebulizer Accessories Kit Unknown at Unknown time  No No   Sig: Use with nebulizer machine   Oxygen-Air Delivery Systems Unknown at Unknown time  Yes No   Sig: by Nasal route continuous. 2 LNC   VENTOLIN HFA 90 mcg/actuation inhaler Unknown at Unknown time  No No   Sig: INHALE 2 PUFFS EVERY 4 HOURS AS NEEDED   acetaminophen (TYLENOL) 325 mg tablet Unknown at Unknown time  Yes No   Sig: Take 325 mg by mouth every six (6) hours as needed for Pain. albuterol (PROVENTIL VENTOLIN) 2.5 mg /3 mL (0.083 %) nebulizer solution Unknown at Unknown time  No No   Sig: USE 1 NEB EVERY 4 HOURS FOR WHEEZING AND SHORTNESS OF BREATH  Indications: CHRONIC OBSTRUCTIVE PULMONARY DISEASE   aspirin 81 mg tablet 5/10/2017 at 0832  Yes Yes   Sig: Take 81 mg by mouth daily. clindamycin (CLEOCIN) 300 mg capsule 5/10/2017 at 1134  No Yes   Sig: Take 1 Cap by mouth every six (6) hours for 3 days. diclofenac (VOLTAREN) 1 % gel Unknown at Unknown time  No No   Sig: Apply 2 g to affected area four (4) times daily as needed for Pain. APPLY TO elbows/wrists/knees/ankles   divalproex DR (DEPAKOTE) 250 mg tablet Unknown at Unknown time  Yes No   Sig: Take 250 mg by mouth nightly. eplerenone (INSPRA) 25 mg tablet Unknown at Unknown time  No No   Sig: Take 1 Tab by mouth daily.    ergocalciferol (VITAMIN D2) 50,000 unit capsule Unknown at Unknown time  No No   Sig: Take 1 Cap by mouth every seven (7) days. famotidine (PEPCID) 20 mg tablet 5/10/2017 at 0833  No Yes   Sig: Take 1 Tab by mouth nightly. furosemide (LASIX) 20 mg tablet Unknown at Unknown time  No No   Sig: Take 1 Tab by mouth daily. gabapentin (NEURONTIN) 300 mg capsule Unknown at Unknown time  Yes No   Sig: Take 300 mg by mouth three (3) times daily. insulin glargine (LANTUS) 100 unit/mL injection 2017 at Unknown time  No Yes   Si units subcutaneously every night   insulin lispro (HUMALOG) 100 unit/mL injection 5/10/2017 at 1134  No Yes   Sig: For Blood Sugar of:  150-169 = 3 Units; 170-189=4 Units  190-209 = 5 Units; 210-229 = 6 Units  230-249 = 7 Units; 250-269 = 8 Units  270-289 = 9 Units; 290-309 = 10 Units  310-329 = 11 Units; 330-349 = 12 Units  350 or  > = Call MD.  Indications: type 2 diabetes mellitus   insulin syringe,safetyneedle 1 mL 30 gauge x \" syrg Unknown at Unknown time  No No   Sig: As directed   l-methylfolate-vit b12-vit b6 (METANX) 2.8-2-25 mg Tab Unknown at Unknown time  Yes No   Sig: Take  by mouth.   levothyroxine (SYNTHROID) 100 mcg tablet Unknown at Unknown time  Yes No   Sig: Take  by mouth Daily (before breakfast). oxyCODONE-acetaminophen (PERCOCET) 5-325 mg per tablet Unknown at Unknown time  No No   Sig: Take 1 Tab by mouth every eight (8) hours as needed for Pain. Max Daily Amount: 3 Tabs. potassium chloride (KLOR-CON) 20 mEq packet Unknown at Unknown time  No No   Sig: Take 1 Packet by mouth Every Mon, Wed & Sun.   rosuvastatin (CRESTOR) 5 mg tablet 2017 at Unknown time  No Yes   Sig: Take 1 Tab by mouth nightly. senna-docusate (PERICOLACE) 8.6-50 mg per tablet 5/10/2017 at 0833  No Yes   Sig: Take 1 Tab by mouth daily. sertraline (ZOLOFT) 100 mg tablet 5/10/2017 at 0833  Yes Yes   Sig: Take 50 mg by mouth daily.    traZODone (DESYREL) 100 mg tablet Unknown at Unknown time  Yes No   Sig: Take 100 mg by mouth nightly. umeclidinium-vilanterol (ANORO ELLIPTA) 62.5-25 mcg/actuation inhaler Unknown at Unknown time  No No   Sig: Take 1 Puff by inhalation daily. Facility-Administered Medications: None       Review Of Systems:   CONSTITUTIONAL: No weight loss. EYES: No blurred vision and no eye discharge. ENT: No nasal discharge. No ear pain. CARDIOVASCULAR: No chest pain and no diaphoresis. RESPIRATORY: No cough, no hemoptysis. GI: No vomiting, no diarrhea   : No urinary frequency and no dysuria. MUSCULOSKELETAL: Significant for acute/chronic multiple joint pains. SKIN: No rashes. NEURO: No dizziness, no numbness. ENDOCRINE: No polyphagia and no polydipsia. HEMATOLOGY: No bleeding tendencies. Objective:     Vital Signs:  Patient Vitals for the past 24 hrs:   BP Temp Pulse Resp SpO2 Height Weight   05/11/17 0816 - - - - - 5' 5\" (1.651 m) 95.3 kg (210 lb 1.6 oz)   05/11/17 0654 130/74 99.1 °F (37.3 °C) 71 22 99 % - -   05/10/17 1930 127/81 98.3 °F (36.8 °C) 81 20 95 % - -   05/10/17 1730 - 98.1 °F (36.7 °C) 63 20 97 % - -        Body mass index is 34.96 kg/(m^2). Physical Examination:  GENERAL SURVEY: Patient is awake, alert, oriented x 3, sitting comfortably on the chair, not in acute respiratory distress.   HEENT: pink palpebral conjunctivae, anicteric sclerae, no nasoaural discharge, moist oral mucosa  NECK: supple, no jugular venous distention, no palpable lymph nodes  CHEST/LUNGS: symmetrical chest expansion, good air entry, clear breath sounds  HEART: adynamic precordium, good S1 S2, no S3, regular rhythm, no murmurs  ABDOMEN: obese, bowel sounds appreciated, soft, non-tender  EXTREMITIES: (+) left hand wrapped in gauze, pink nailbeds, (+) warmth/erythema/swelling of both elbows, (+) bipedal edema, full and equal pulses, no calf tenderness   NEUROLOGICAL EXAM: The patient is awake, alert and oriented x3, able to answer questions fairly appropriately, able to follow 1 and 2 step commands. Able to tell time from the wall clock. Cranial nerves II-XII are grossly intact. No gross sensory deficit. Motor strength is 2+/5 on both shoulders, 3/5 on the right elbow, 3-/5 on the left elbow, 2+/5 on both wrists, 3+/5 on the right handgrip, 3/5 on the left handgrip, 3-/5 on the right hip, 3/5 on the left hip, 3-/5 on both knees and both ankles.       Current Medications:  Current Facility-Administered Medications   Medication Dose Route Frequency    acetaminophen (TYLENOL) tablet 650 mg  650 mg Oral Q4H PRN    docusate sodium (COLACE) capsule 100 mg  100 mg Oral BID    bisacodyl (DULCOLAX) tablet 10 mg  10 mg Oral Q48H PRN    heparin (porcine) injection 5,000 Units  5,000 Units SubCUTAneous Q8H    insulin lispro (HUMALOG) injection   SubCUTAneous TIDAC    clindamycin (CLEOCIN) capsule 300 mg  300 mg Oral Q6H    famotidine (PEPCID) tablet 20 mg  20 mg Oral BID    [START ON 5/14/2017] eplerenone (INSPRA) tablet 25 mg  25 mg Oral DAILY    insulin glargine (LANTUS) injection 65 Units  65 Units SubCUTAneous QHS    albuterol (PROVENTIL VENTOLIN) nebulizer solution 2.5 mg  2.5 mg Nebulization Q4H PRN    divalproex DR (DEPAKOTE) tablet 250 mg  250 mg Oral QHS    ARIPiprazole (ABILIFY) tablet 10 mg  10 mg Oral DAILY    gabapentin (NEURONTIN) capsule 300 mg  300 mg Oral Q8H    traZODone (DESYREL) tablet 100 mg  100 mg Oral QHS    rosuvastatin (CRESTOR) tablet 5 mg  5 mg Oral QHS    aspirin chewable tablet 81 mg  81 mg Oral DAILY WITH BREAKFAST    levothyroxine (SYNTHROID) tablet 100 mcg  100 mcg Oral ACB    sertraline (ZOLOFT) tablet 50 mg  50 mg Oral DAILY    HYDROcodone-acetaminophen (NORCO) 5-325 mg per tablet 1 Tab  1 Tab Oral Q4H PRN    umeclidinium-vilanterol (ANORO ELLIPTA) 62.5 mcg- 25 mcg/inhalation  1 Puff Inhalation QAM RT    B complex-vitaminC-folic acid (NEPHROCAP) cap  1 Cap Oral DAILY    diclofenac (VOLTAREN) 1 % topical gel 2 g  2 g Topical QID Functional Assessment:     Occupational Therapy   Prior Level of Function  Pre-Admission Screen  Post-Admission Evaluation   Eating   Independent Eating   Dependent Eating  Functional Level: 2   Grooming   Independent Grooming   Dependent Grooming  Functional Level: 1   Upper Body Dressing   Minimal Assist Upper Body Dressing   Maximal Assist Upper Body Dressing  Functional Level: 1   Lower Body Dressing   Minimal Assist Lower Body Dressing   Dependent Lower Body Dressing  Functional Level: 1   Bladder Management   Independent Bladder Management   Dependent Toileting  Functional Level: 2   Bowel Management   Independent Bowel Management   Dependent      Physical Therapy   Prior Level of Function  Pre-Admission Screen  Post-Admission Evaluation   Ambulation   Supervision Ambulation   Maximal Assist Gait  Amount of Assistance: 1 (Dependent/total assistance)  Distance (ft): 0 Feet (ft)  Assistive Device: Other (comment) (attempted in p-bars, but unable to advance LE's)   Bed Mobility   Independent Bed Mobility   Moderate Assist Bed/Mat Mobility  Rolling Right : 1 (Total assistance)  Rolling Left : 1 (Total assistance)  Supine to Sit : 1 (Total assistance)  Sit to Supine : 1 (Total assistance)   Supine to Sit   Independent Supine to Sit   Maximal Assist Bed/Mat Mobility  Rolling Right : 1 (Total assistance)  Rolling Left : 1 (Total assistance)  Supine to Sit : 1 (Total assistance)  Sit to Supine : 1 (Total assistance)   Sit to Stand   Independent Sit to Stand   Maximal Assist Bed/Mat Mobility  Rolling Right : 1 (Total assistance)  Rolling Left : 1 (Total assistance)  Supine to Sit : 1 (Total assistance)  Sit to Supine : 1 (Total assistance)   Bed/Chair Transfers   Independent Bed/Chair Transfers   Maximal Assist Transfers  Transfer Type: Lateral with transfer board  Transfer Assistance : 2 (Maximal assistance)  Sit to Stand Assistance: Maximum assistance (in p-bars, pull to stand)  Car Transfers: Not tested  Robyn Type: n/a   Toilet Transfers   Independent Toilet Transfers   Maximal Assist Toilet Transfers  Toilet Transfer Score: 0     Speech and Language Pathology  Post-Admission Evaluation     Comprehension (Native Language)  Primary Mode of Comprehension: Auditory  Score: 4     Expression (Native Language)  Primary Mode of Expression: Verbal  Score: 4     Social Interaction/Pragmatics  Score: 4     Problem Solving  Score: 3     Memory  Score: 3       Legend:   7 - Independent   6 - Modified Independent   5 - Standby Assistance / Supervision / Set-up   4 - Minimum Assistance / Contact Guard Assistance   3 - Moderate Assistance   2 - Maximum Assistance   1 - Total Assistance / Dependent       Labs on Admission:  Recent Results (from the past 24 hour(s))   GLUCOSE, POC    Collection Time: 05/10/17  5:33 PM   Result Value Ref Range    Glucose (POC) 125 (H) 70 - 110 mg/dL   GLUCOSE, POC    Collection Time: 05/10/17  8:50 PM   Result Value Ref Range    Glucose (POC) 171 (H) 70 - 110 mg/dL   CBC WITH AUTOMATED DIFF    Collection Time: 05/11/17  6:39 AM   Result Value Ref Range    WBC 9.0 4.6 - 13.2 K/uL    RBC 4.11 (L) 4.20 - 5.30 M/uL    HGB 10.7 (L) 12.0 - 16.0 g/dL    HCT 33.1 (L) 35.0 - 45.0 %    MCV 80.5 74.0 - 97.0 FL    MCH 26.0 24.0 - 34.0 PG    MCHC 32.3 31.0 - 37.0 g/dL    RDW 14.0 11.6 - 14.5 %    PLATELET 287 606 - 665 K/uL    MPV 9.8 9.2 - 11.8 FL    NEUTROPHILS 62 40 - 73 %    LYMPHOCYTES 24 21 - 52 %    MONOCYTES 11 (H) 3 - 10 %    EOSINOPHILS 3 0 - 5 %    BASOPHILS 0 0 - 2 %    ABS. NEUTROPHILS 5.6 1.8 - 8.0 K/UL    ABS. LYMPHOCYTES 2.1 0.9 - 3.6 K/UL    ABS. MONOCYTES 0.9 0.05 - 1.2 K/UL    ABS. EOSINOPHILS 0.2 0.0 - 0.4 K/UL    ABS.  BASOPHILS 0.0 0.0 - 0.06 K/UL    DF AUTOMATED     MAGNESIUM    Collection Time: 05/11/17  6:39 AM   Result Value Ref Range    Magnesium 1.6 1.6 - 2.6 mg/dL   METABOLIC PANEL, BASIC    Collection Time: 05/11/17  6:39 AM   Result Value Ref Range    Sodium 139 136 - 145 mmol/L Potassium 4.9 3.5 - 5.5 mmol/L    Chloride 102 100 - 108 mmol/L    CO2 32 21 - 32 mmol/L    Anion gap 5 3.0 - 18 mmol/L    Glucose 111 (H) 74 - 99 mg/dL    BUN 16 7.0 - 18 MG/DL    Creatinine 1.26 0.6 - 1.3 MG/DL    BUN/Creatinine ratio 13 12 - 20      GFR est AA 53 (L) >60 ml/min/1.73m2    GFR est non-AA 43 (L) >60 ml/min/1.73m2    Calcium 10.4 (H) 8.5 - 10.1 MG/DL   VITAMIN D, 25 HYDROXY    Collection Time: 05/11/17  6:39 AM   Result Value Ref Range    Vitamin D 25-Hydroxy 62.4 30 - 100 ng/mL   GLUCOSE, POC    Collection Time: 05/11/17  9:21 AM   Result Value Ref Range    Glucose (POC) 124 (H) 70 - 110 mg/dL   GLUCOSE, POC    Collection Time: 05/11/17 11:40 AM   Result Value Ref Range    Glucose (POC) 174 (H) 70 - 110 mg/dL       Estimated Glomerular Filtration Rate:  On admission, based on a Creatinine of 1.26 mg/dl:   Estimated GFR using CKD-EPI = 53.6 mL/min/1.73m2   Estimated GFR using MDRD = 55.7 mL/min/1.73m2      Assessment:     Primary Rehabilitation Diagnosis  1. Impaired Mobility and ADLs  2. Systemic inflammatory response syndrome (SIRS) secondary to:   a. Cellulitis of the right forearm   b. Olecranon bursitis of the right elbow   c. Contusion of left elbow   d.  Right Achilles tendonitis    Comorbidities   Type 2 diabetes with stage 3 chronic kidney disease GFR 30-59     Diabetic neuropathy associated with type 2 diabetes mellitus     Venous insufficiency    Obesity, Class I, BMI 30-34.9    Chronic back pain    Generalized osteoarthritis of multiple sites    Bipolar affective disorder     Abnormally low high density lipoprotein (HDL) cholesterol with hypertriglyceridemia    Diastolic dysfunction without heart failure    Chronic obstructive pulmonary disease (COPD)     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 congestive heart failure    CKD (chronic kidney disease) stage 3, GFR 30-59 ml/min    Depression    History of back injury    Anxiety  Wears glasses    History of hepatitis C    Sickle cell trait ([facility] [address]    History of acute renal failure    Hypothyroidism    Recurrent genital herpes    Sarcoidosis ([facility] [address]    History pf abscess of right arm    Hypoxemia requiring supplemental oxygen    Abnormal nuclear stress test    History of cellulitis and abscess of hand    History of cellulitis and abscess of foot    Intravenous drug user    History of penicillin allergy    Falls frequently    Gastroesophageal reflux disease with hiatal hernia    Memory difficulty    Mixed connective tissue disease        Willingness to participate in the program: Good      Rehabilitation Potential: Good      Plan:     1. Medical Issues being followed closely:    [x]  Fall and safety precautions     []  Wound Care     [x]  Bowel and Bladder Function     [x]  Fluid Electrolyte and Nutrition Balance     [x]  Pain Control      2. Issues that 24 hour rehabilitation nursing is following:    [x]  Fall and safety precautions     []  Wound Care     [x]  Bowel and Bladder Function     [x]  Fluid Electrolyte and Nutrition Balance     [x]  Pain Control      [x]  Assistance with and education on in-room safety with transfers to and from the bed, wheelchair, toilet and shower. 3. Acute rehabilitation plan of care:    [x]  Patient to be evaluated and treated by:           [x]  Physical Therapy           [x]  Occupational Therapy           []  Speech Therapy     []  Hold Rehab until further notice     5. Medications:    [x]  MAR Reviewed     [x]  Continue Present Medications     6. DVT Prophylaxis:      []  Lovenox     [x]  Unfractionated Heparin     []  Coumadin     []  NOAC     [x]  ROBERT Stockings     []  Sequential Compression Device     []  None     7. Rehabilitation program and expectations from patient, as well as medical issues discussed with the patient. REHABILITATION PLAN:    1.  The patient is being admitted to a comprehensive acute inpatient rehabilitation program consisting of at least 180 minutes a day, 5 out of 7 days a week of  combined physical and occupational therapy, and close supervision by a physician with special training and experience in rehabilitation medicine. 2. The patient's prognosis for significant practical improvement within a reasonable period of time appears to be good. 3. The estimated length of stay is 12 days. 4. The patient/family has a good understanding of our discharge process. The patient has potential to make improvement and is in need of at least two of the following multidisciplinary therapies including but not limited to physical, occupational, speech, nutritional services, wound care, prosthetics and orthotics. The patient is expected to be able to return to home with home health therapy and family support. 5. Given the patient's multiple co-morbidities and risk for further medical complications, rehabilitation services could not be safely provided at a lower level of care such as a skilled nursing facility. 6. Physical therapy for therapeutic exercise, progressive mobility, gait training, transfer training, bed mobility training, patient and family education, and wheelchair mobility training. Physical therapy goals to address - extremity function, range of motion, balance, safety awareness, independence in transfers, activity tolerance, independence in bed mobility, and independence in ambulation. 7. Occupational therapy for self-care home management, transfer training, therapeutic exercise, activity, wheelchair mobility training. Occupational therapy goals to address - extremity function, cognition, balance, activity tolerance, independence in functional transfers, range of motion, safety awareness, independence in ADL  and independence in home management skills.   8. Specialized 24 hour rehabilitation nursing care for bowel and bladder retraining, disease management, pain management, pressure ulcer prevention and management per policy, education on pressure relief techniques, embolism prevention, nutrition management, hydration management, transfer training and   medication distribution. 9. Nutrition and Dietary services will be obtained for assessment of adequate calorie needs, hydration and calorie counts as appropriate. 10. Therapeutic recreation for leisure skills. 6. Rehab psychology for coping skills. 12. Social work services for patient and family counseling and safe discharge planning. 13. I will be in charge of the inpatient rehab program. Full details of the inpatient rehab program will be outlined in the initial team conference. REHABILITATION GOALS:  Improve functional and activities of daily living skills in order to return back to independent living with family support.        MEDICAL PLAN:  > Systemic inflammatory response syndrome (SIRS) secondary to Cellulitis of the right forearm, Olecranon bursitis of the right elbow, Contusion of left elbow and Right Achilles tendonitis    > Clindamycin 300 mg PO q 6 hr  (STOP DATE: 5/13/2017)   > Diclofenac 2 grams applied to affected areas 4 times daily    > Abnormally low HDL with hypertriglyceridemia   > Rosuvastatin 5 mg PO q HS    > 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 congestive heart failure     > Hold Furosemide 20 mg PO once daily (re: rising Creatinine)    > Bipolar affective disease   > Aripiprazole 10 mg PO once daily   > Divalproex  mg PO q HS    > Chronic obstructive pulmonary disease   > Anoro Ellipta 1 puff inhaled once daily   > Albuterol nebulization q 4 hr PRN for shortness of breath    > Depression / Anxiety   > Aripiprazole 10 mg PO once daily   > Sertraline 50 mg PO once daily   > Trazodone 100 mg PO q HS    > Diabetic neuropathy   > Gabapentin 300 mg PO q 8 hr    > Diastolic dysfunction without heart failure   > Hold Furosemide 20 mg PO once daily (re: rising Creatinine)   > Eplerenone 25 mg PO once daily (to be resumed on 5/14/2017)    > Gastroesophageal reflux disease with hiatal hernia   > Famotidine 20 mg PO BID    > Hypothyroidism   > Levothyroxine 100 mcg PO once daily before breakfast    > Hypoxemia requiring supplemental oxygen   > O2 inhalation 2 LPM via nasal cannula continuous    > Type 2 diabetes mellitus with diabetic neuropathy and stage 3 chronic kidney disease    > Prior to admission to [facility], the patient claimed she wa snot using Lantus at home; instead, she was using Insulin U500 on a sliding scale basis   > Lantus 65 units SC q HS   > Humalog insulin sliding scale SC TID AC only    > Mg (5/11/2017, on admission) = 1.6   > Mg(OH)2 400 mg PO BID    > Analgesia   > Discontinue Diclofenac 2 grams applied to affected areas 4 times daily    > Acetaminophen 650 mg PO q 4 hr PRN for pain level less than 5/10   > Naproxen 375 mg PO BID with meals   > Norco 5/325 1 tab PO q 4 hr PRN for pain level greater than 4/10       PRECAUTIONS:   1. Safety/fall precautions. 2. Deep venous thrombosis precautions. POTENTIAL BARRIERS TO DISCHARGE:   1. Risk for falls. 2. The patient lives alone. 3. Family limited in ability to provide constant care. 4. Limited community resources. RELEVANT CHANGES SINCE PREADMISSION SCREENING: I have compared the patients medical and functional status at the time of the pre-admission screening and on this post-admission evaluation. The preadmission screen and findings from therapy evaluations both support my post admission physician evaluation, deeming this patient to be an appropriate candidate for the IRF. The patient requires multidisciplinary treatment, physician oversight and intensive therapy not provided at a lower level of care.      By signing this document, I acknowledge that I have personally performed a full physical examination on this patient within 24 hours of admission to this inpatient rehabilitation facility and have determined the patient to be able to tolerate the above course of treatment at an intensive level for a reasonable period of time. I will be completing a detailed individualized plan of care for this patient by day #4 of the patients stay based upon the Pre-Admission Screen, the Post-Admission Evaluation, and the therapy evaluations.       Signed:    Beena Cain MD    May 11, 2017

## 2017-05-11 NOTE — PROGRESS NOTES
Pt is a 62year old female admitted to ARU for SIRS. Pt is alert and oriented, alone in the room. Pt states that she lives alone in a 1 level apartment with 5 steps to enter and a tub shower. Pt states that prior to admission she was able to self care and had a personal care aide for 8 hours/day 7days/week from [facility]. Pt states that she has a rolling walker but did not use it and the pt's daughter previously reported to staff that the pt used the walker for 2.5 years. Pt and daughter deny further DME for mobility. Pt states that she has O2 with [facility] at home. Pt states that she used home health 2-4 months ago with [facility]. Pt states that she has no history with SNF or outpatient therapy. Pt states that she does not have a POA and notes that her daughter Lucretia Willett ([phone]) is her NOK contact. Pt confirms that she is covered by [facility]. Sw reviewed dc planning, insurance updates and team conference. Sw will follow.

## 2017-05-11 NOTE — PROGRESS NOTES
NUTRITION     Nutrition Consult: General Nutrition Management & Supplements     RECOMMENDATIONS / PLAN:     - No nutrition intervention indicated at this time. Will re-screen as appropriate. NUTRITION INTERVENTIONS & DIAGNOSIS:     Nutrition Diagnosis:  No nutrition diagnosis at this time    ASSESSMENT:     Subjective/Objective:  Patient lethargic at time of visit. Noted excellent po intake most meals while in main hospital, 100% per chart documentation. 100% po intake dinner last night per chart. Average po intake adequate to meet patients estimated nutritional needs:   [x] Yes     [] No   [] Unable to determine at this time    Diet: DIET DIABETIC CONSISTENT CARB Regular; AHA-LOW-CHOL FAT; No Conc.  Sweets      Food Allergies:  None known   Current Appetite:   [] Good     [] Fair     [] Poor     [x] Other: unknown   Appetite/meal intake prior to admission:   [] Good     [] Fair     [] Poor     [x] Other:  Unknown   Feeding Limitations:  [] Swallowing difficulty    [] Chewing difficulty    [] Other:  Current Meal Intake: Patient Vitals for the past 100 hrs:   % Diet Eaten   05/10/17 1839 100 %       BM:  5/10  Skin Integrity:  Abscess left hand; abscess foot; callus (location not documented); abrasion elbow  Edema:  None   Pertinent Medications: Reviewed    Recent Labs      05/11/17   0639  05/10/17   0350  05/09/17   0325   NA  139  138  142   K  4.9  4.5  4.5   CL  102  103  106   CO2  32  28  28   GLU  111*  103*  102*   BUN  16  19*  18   CREA  1.26  1.33*  1.31*   CA  10.4*  9.5  9.1   MG  1.6  1.6  1.6       Intake/Output Summary (Last 24 hours) at 05/11/17 1648  Last data filed at 05/10/17 1839   Gross per 24 hour   Intake              200 ml   Output                0 ml   Net              200 ml       Anthropometrics:  Ht Readings from Last 1 Encounters:   05/11/17 5' 5\" (1.651 m)     Last 3 Recorded Weights in this Encounter    05/11/17 0816   Weight: 95.3 kg (210 lb 1.6 oz)     Body mass index is 34.96 kg/(m^2).           Weight History:   Weight Metrics 5/11/2017 5/10/2017 5/2/2017 4/20/2017 4/13/2017 4/4/2017 2/9/2017   Weight 210 lb 1.6 oz - 210 lb 232 lb 3.2 oz - 219 lb 219 lb   BMI - 34.96 kg/m2 34.95 kg/m2 38.64 kg/m2 36.44 kg/m2 - 36.44 kg/m2        Admitting Diagnosis: Right forearm cellulits  SIRS (systemic inflammatory response syndrome) ([facility]  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 congestive heart failure     Better controlled     Bipolar affective disorder ([address] 12/5/2012    Cellulitis of right forearm 5/4/2017    Chronic back pain     Chronic obstructive pulmonary disease (COPD) (HCC)     SOB, on paula O2 Recent admission with psychosis     CKD stage G3a/A1, GFR 45-59 and albumin creatinine ratio <30 mg/g 5/11/2017    Urine microalbumin/Creatinine ratio (5/11/2017) = 9 mg/g    Contusion of left elbow 5/4/2017    Depression     Diabetic neuropathy associated with type 2 diabetes mellitus (HCC)     Diastolic dysfunction without heart failure     Stable on diuretics     Falls frequently     Gastroesophageal reflux disease with hiatal hernia     Generalized osteoarthritis of multiple sites     History of acute renal failure 5/31/2013    History of back injury     jumped out of second story window     History of hepatitis C     treated    History of penicillin allergy     Hypothyroidism     Hypoxemia requiring supplemental oxygen 12/29/2014    Intravenous drug user 5/2/2017    Memory difficulty     Mixed connective tissue disease ([facility] 5/10/2017    Obesity, Class I, BMI 30-34.9     Olecranon bursitis of right elbow 5/4/2017    Recurrent genital herpes 5/31/2013    Right Achilles tendinitis 5/2/2017    Sarcoidosis ([address]  Sickle cell trait (HCC)     Type 2 diabetes with stage 3 chronic kidney disease GFR 30-59 (HCC)     Venous insufficiency     Wears glasses        Education Needs:        [x] None identified  [] Identified - Not appropriate at this time  []  Identified and addressed - refer to education log  Learning Limitations:   [x] None identified  [] Identified    Cultural, Yarsani & ethnic food preferences:  [x] None identified    [] Identified and addressed     ESTIMATED NUTRITION NEEDS:     Calories: 0789-9143 kcal (MSJx1.2-1.3) based on  [x] Actual BW: 95 kg      [] IBW   CHO: 228-247 gm (50% kcal)   Protein: 68-74 gm (15% kcal) based on  [x] Actual BW      [] IBW   Fluid: 1 mL/kcal     MONITORING & EVALUATION:     Nutrition Goal(s):   1. Po intake of meals will meet >75% of patient estimated nutritional needs within the next 7 days.   Outcome:  [] Met/Ongoing    []  Not Met    [x] New/Initial Goal     Monitoring:   [x] Diet tolerance   [x] Meal intake   [] Supplement intake   [] GI symptoms/ability to tolerate po diet   [] Respiratory status   [] Plan of care      Previous Recommendations (for follow-up assessments only):     []   Implemented       []   Not Implemented (RD to address)     [] No Recommendation Made     Discharge Planning:  Diabetic diet  [x] Participated in care planning, discharge planning, & interdisciplinary rounds as appropriate      Wilda Harris, [address] [place] [address]   Pager: [phone]

## 2017-05-11 NOTE — PROGRESS NOTES
Problem: Mobility Impaired (Adult and Pediatric)  Goal: *Acute Goals and Plan of Care (Insert Text)  Physical Therapy Goals  STGs  Initiated 5/11/2017 and to be accomplished within 7 day(s) [5/18/17]  1. Patient will roll side to side in bed with moderate assistance. 2. Patient will perform supine to sit and sit to supine with moderate assistance. 3. Patient will transfer from bed to chair and chair to bed with moderate assistance using the least restrictive device. 4. Patient will perform sit to stand with moderate assistance . 5. Patient will propel w/c on level surface for 50 ft with R UE and B LEs with min A. LTGs  Initiated 5/11/2017 and to be accomplished within 4 weeks [6/8/17]  1. Patient will roll side to side in bed with contact guard assist.   2. Patient will perform supine to sit and sit to supine in bed with contact guard assistance. 3. Patient will transfer from bed to chair and chair to bed with contact guard assistance using the least restrictive device. 4. Patient will perform sit to stand with contact guard assist.  5. Patient will ambulate with minimal assistance/contact guard assist for 50 feet with the least restrictive device. 5. Patient will ascend/descend 3 stairs with bilateral handrail(s) with moderate assistance. PHYSICAL THERAPY EXAMINATION  Time In: 1015  Time Out: 1130  Pt seen for: eval, transfers training, balance training, w/c mobility training, gait training, and pt education  (-1 unit due to pt still eating breakfast at start of tx time)     Patient Name: Ania Gotti  Patient Age: 62 y.o.   Past Medical History:   Past Medical History:   Diagnosis Date    Abnormally low high density lipoprotein (HDL) cholesterol with hypertriglyceridemia       Lipid profile (11/6/2016) showed , , HDL 38, LDL 37    Anxiety      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 congestive heart failure       Better controlled     Bipolar affective disorder ([facility] 2012    Cellulitis of right forearm 2017    Chronic back pain      Chronic obstructive pulmonary disease (COPD) ([facility])       SOB, on paula O2 Recent admission with psychosis     CKD (chronic kidney disease) stage 3, GFR 30-59 ml/min      Contusion of left elbow 2017    Depression      Diabetic neuropathy associated with type 2 diabetes mellitus ([facility])      Diastolic dysfunction without heart failure       Stable on diuretics     Falls frequently      Gastroesophageal reflux disease with hiatal hernia      Generalized osteoarthritis of multiple sites      History of acute renal failure 2013    History of back injury       jumped out of second story window     History of hepatitis C       treated    History of penicillin allergy      Hypothyroidism      Hypoxemia requiring supplemental oxygen 2014    Intravenous drug user 2017    Memory difficulty      Mixed connective tissue disease ([facility] 5/10/2017    Obesity, Class I, BMI 30-34.9      Olecranon bursitis of right elbow 2017    Recurrent genital herpes 2013    Right Achilles tendinitis 2017    Sarcoidosis ([facility]      Sickle cell trait ([facility]      Type 2 diabetes with stage 3 chronic kidney disease GFR 30-59 ([facility]      Venous insufficiency      Wears glasses          Pain at start of tx: 7/10 in R ankle, knee, and hand  Pain at stop of tx: 9/10 in R ankle, knee, and hand     Patient identified with name and :yes     Medical Diagnosis:  Right forearm cellulits  SIRS (systemic inflammatory response syndrome) ([facility]) SIRS (systemic inflammatory response syndrome) ([facility])          Therapy Diagnosis:   Difficulty with bed mobility  [X]     Difficulty with functional transfers  [X]     Difficulty with ambulation  [X]     Difficulty with stair negotiations  [X]        Problem List:    Decreased strength B LE  [X]     Decreased strength trunk/core  [X]     Decreased AROM   [X]     Decreased PROM  [X]    Decreased endurance  [X]     Decreased balance sitting  [X]     Decreased balance standing  [X]     Pain   [X]     Slow ambulation velocity  [X]    Decreased coordination  [X]    Decreased safety awareness  [X]       Functional Limitations:   Decreased independence with bed mobility  [X]     Decreased independence with functional transfers  [X]     Decreased independence with ambulation  [X]     Decreased independence with stair negotiation  [X]        Previous Functional Level:  pt reports being independent with transfers, bed mobility, and gait without assistive device. Used portable O2. Was able to negotiate 5 steps to enter apt with 1 HR and portable O2 in other hand. Had an aide 6 hrs/day x 7 days/week to assist with bathing, ADL's, and meals. Pt states she has a shower chair and RW at home. Home Environment: Home Environment: Apartment  # Steps to Enter: 5  Rails to Enter: Yes  Hand Rails : Bilateral  Wheelchair Ramp: No  One/Two Story Residence: One story  Living Alone: Yes  Support Systems: Family member(s), [employer] / agued[employer] community  Patient Expects to be Discharged to[de-identified] Private residence  Current DME Used/Available at Home: Walker, rolling  Tub or Shower Type: Tub/Shower combination             Outcome Measures: FIMS/MMT     Pt seen for moderate complexity eval due to factors to include pain in multiple joints and R achilles tendonitis and UE cellulits diagnoses; and presents as evolving and changing characteristics due to pain levels and O2 sats need monitoring. Pt participated in w/c mobility training, transfers training using a slide board, sit/stand and attempted gait training in p-bars, and sitting balance training at EOM.                    MMT Initial Assessment    Right Lower Extremity Left Lower Extremity   Hip Flexion 3- 3   Knee Extension 3 3   Knee Flexion 3- 3-   Ankle Dorsiflexion 3- 3-   0/5       No palpable muscle contraction  1/5       Palpable muscle contraction, no joint movement  2-/5      Less than full range of motion in gravity eliminated position  2/5       Able to complete full range of motion in gravity eliminated position  2+/5     Able to initiate movement against gravity  3-/5      More than half but not full range of motion against gravity  3/5       Able to complete full range of motion against gravity  3+/5     Completes full range of motion against gravity with minimal resistance  4-/5      Completes full range of motion against gravity with minimal-moderate resistance  4/5       Completes full range of motion against gravity with moderate resistance  4+/5     Completes full range of motion against gravity with moderate-maximum resistance  5/5       Completes full range of motion against gravity with maximum resistance                 AROM: grossly limited B LE's due to pain and weakness;  PROM generally decreased, functional B LE's      FIM SCORES Initial Assessment   Bed/Chair/Wheelchair Transfers Transfer Type: Lateral with transfer board  Transfer Assistance : 2 (Maximal assistance)  Sit to Stand Assistance: Maximum assistance (in p-bars, pull to stand)  Car Transfers: Not tested  Car Type: n/a   Wheelchair Mobility Able to Propel (ft): 10 feet  Functional Level: 1  Curbs/Ramps Assist Required (FIM Score): 0 (Not tested)  Wheelchair Setup Assist Required : 2 (Maximal assistance)  Wheelchair Management: Other (comment) (needs help to manage brakes at this time)   Walking [place] 1 (Dependent/total assistance)   Steps/Stairs Steps/Stairs Ambulated (#): 0  Level of Assist : 0 (Not tested)   PRIMARY MODE OF LOCOMOTION: wheelchair  Please see [facility] Interdisciplinary Eval: Coordination/Balance Section for details regarding FIM score description.       BED/CHAIR/WHEELCHAIR TRANSFERS Initial Assessment   Rolling Right 1 (Total assistance)   Rolling Left 1 (Total assistance)   Supine to Sit 1 (Total assistance)   Sit to Stand Maximum assistance (in p-bars, pull to stand)   Sit to Supine 1 (Total assistance)   Transfer Assist Score Transfer Type: Lateral with transfer board  Transfer Assistance : 2 (Maximal assistance)  Sit to Stand Assistance: Maximum assistance (in p-bars, pull to stand)  Car Transfers: Not tested  Car Type: n/a   Transfer Type Lateral with transfer board   Comments pt with difficulty wt shifting, pushing with UE's, and scooting   Car Transfer Not tested   Car Type n/a          WHEELCHAIR MOBILITY/MANAGEMENT Initial Assessment   Able to Propel 10 feet   Functional Level 1   Curbs/ramps assistance required 0 (Not tested)   Wheelchair set up assistance required 2 (Maximal assistance)   Wheelchair management Other (comment) (needs help to manage brakes at this time)          WALKING INDEPENDENCE Initial Assessment   Assistive device Other (comment) (attempted in p-bars, but unable to advance LE's)   Ambulation assistance - level surface 1 (Dependent/total assistance)   Distance 0 Feet (ft)   Functional Level 1   Comments attempted in p-bars, pt unable to wt shift and advance LE's, pain R knee/ankle limits   Ambulation assistance - unlevel surface  not tested          STEPS/STAIRS Initial Assessment   Steps/Stairs ambulated Steps/Stairs Ambulated (#): 0  Level of Assist : 0 (Not tested)   Rail Use     Functional Level 0   Comments n/a at this time due to inability to stand/walk   Curbs/Ramps  not tested              PHYSICAL THERAPY PLAN OF CARE     Therapy Diagnosis:   Please see table above     Order received from MD for physical therapy services and chart reviewed. Pt to be seen 5 times per week for 3 hours of total therapy per day for 4 weeks. Thank you for the referral.     LTGs:  Please see above for STG's and LTG's. Thank you. Pt would benefit from skilled physical therapy in order to improve independent functional mobility within the home.  Interventions may include range of motion (AROM, PROM B LE/trunk), motor function (B LE/trunk strengthening/coordination), activity tolerance (vitals, oxygen saturation levels), bed mobility training, balance activities, gait training (progressive ambulation program), and functional transfer training. Please see [facility]; Interdisciplinary Eval, Care Plan, and Patient Education for further information regarding physical therapy examination and plan of care.       Neo Puente, PT  5/11/2017

## 2017-05-11 NOTE — PROGRESS NOTES
Problem: Self Care Deficits Care Plan (Adult)  Goal: *Acute Goals and Plan of Care (Insert Text)  Long Term Goals (to be met upon discharge date) in order to increase pts functional independence and safety, and decrease burden of care:  1. Pt will perform self-feeding with setup. 2. Pt will perform grooming with setup  3. Pt will perform UB bathing with Min A.  4. Pt will perform LB bathing with Min A.  5. Pt will perform tub/shower transfer with Min A.   6. Pt will perform UB dressing with Min A.  7. Pt will perform LB dressing with Min A.  8. Pt will perform toileting task with Min A.  9. Pt will perform toilet transfer with Min A. Short Term Weekly Goals for (5/11/2017 to 5/18/2017) in order to increase pts functional independence and safety, and decrease burden of care:  1. Pt will perform self-feeding with Mod A.   2. Pt will perform grooming with Mod A.  3. Pt will perform UB bathing with Mod A.  4. Pt will perform LB bathing with Max A.  5. Pt will perform tub/shower transfer with Max A.  6. Pt will perform UB dressing with Max A.  7. Pt will perform LB dressing with Max A.  8. Pt will perform toileting task with Max A.  9. Pt will perform toilet transfer with Max A.   OCCUPATIONAL THERAPY EXAMINATION     Patient Name: Beatriz Hdz  Patient Age: 62 y.o.   Past Medical History:   Past Medical History:   Diagnosis Date    Abnormally low high density lipoprotein (HDL) cholesterol with hypertriglyceridemia       Lipid profile (11/6/2016) showed , , HDL 38, LDL 37    Anxiety      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 congestive heart failure       Better controlled     Bipolar affective disorder ([facility] [address] 12/5/2012    Cellulitis of right forearm 5/4/2017    Chronic back pain      Chronic obstructive pulmonary disease (COPD) (HCC)       SOB, on paula O2 Recent admission with psychosis     CKD (chronic kidney disease) stage 3, GFR 30-59 ml/min  Contusion of left elbow 5/4/2017    Depression      Diabetic neuropathy associated with type 2 diabetes mellitus (HCC)      Diastolic dysfunction without heart failure       Stable on diuretics     Falls frequently      Gastroesophageal reflux disease with hiatal hernia      Generalized osteoarthritis of multiple sites      History of acute renal failure 5/31/2013    History of back injury       jumped out of second story window     History of hepatitis C       treated    History of penicillin allergy      Hypothyroidism      Hypoxemia requiring supplemental oxygen 12/29/2014    Intravenous drug user 5/2/2017    Memory difficulty      Mixed connective tissue disease ([facility] [address] 5/10/2017    Obesity, Class I, BMI 30-34.9      Olecranon bursitis of right elbow 5/4/2017    Recurrent genital herpes 5/31/2013    Right Achilles tendinitis 5/2/2017    Sarcoidosis ([facility] [address]      Sickle cell trait ([facility] [address]      Type 2 diabetes with stage 3 chronic kidney disease GFR 30-59 ([facility] [address]      Venous insufficiency      Wears glasses           Medical Diagnosis:  Right forearm cellulits  SIRS (systemic inflammatory response syndrome) ([facility]) SIRS (systemic inflammatory response syndrome) ([facility])   Therapy Diagnosis:   Difficulty with ADLs  [X]     Difficulty with functional transfers  [X]     Difficulty with ambulation  [X]     Difficulty with IADLs  [X]        Problem List:    Decreased strength B UE  [X]     Decreased strength trunk/core  [X]     Decreased AROM   [X]     Decreased endurance  [X]     Decreased balance sitting  [X]     Decreased balance standing  [X]     Pain   [X]     Decreased PROM (Limited by pain)  [X]        Functional Limitations:   Decreased independence with ADL  [X]     Decreased independence with functional transfers  [X]     Decreased independence with ambulation  [X]     Decreased independence with IADL  [X]        Previous Functional Level:  Pt reports she has a personal care aide 7 days a week from 9 AM - 3 PM. She states the aide assisted pt with bathing, dressing, and IADLs prior to this hospitalization. She states she was ambulating in the home without an AD. Home Environment: Home Situation  Home Environment: Apartment  # Steps to Enter: 5  Rails to Enter: Yes  Hand Rails : Bilateral  One/Two Story Residence: One story  Living Alone: Yes  Support Systems: Family member(s), [employer] community  Patient Expects to be Discharged to[de-identified] Private residence  Current DME Used/Available at Home: Florian Olivern, rolling  Tub or Shower Type: Tub/Shower combination     Precautions: Fall     Time In: 0810  Time Out: 0950     Time In: 1332  Time Out: 1410     Pain at start of AM tx: 7/10  Pain at stop of AM tx: Pt reports pain is improving after receiving pain medication in middle of tx session     Pain at start of PM tx: Pt states, \"I can't have pain medicine until 2:30 so it's getting intense. \"  Pain at stop of PM tx: Pt reports being comfortable in bed and does not report a pain level. Patient identified with name and : yes  Objective: AM Session: Pt performed full body sponge bathing at bed level; see below for functional details. She required the assistance of 2 people for ant and up weightshift, balance, weightshift, and RLE management to perform stand step transfer without an AD from bed to w/c. Pt did not follow therapist's cues for backing up all the way to the w/c prior to sitting down and therefore required Total A for safety to finish pivoting in order to safely sit in the w/c. Pt then completed grooming and was assisted with breakfast. In PM session, OT completed UE ROM/strength assessment and pt reported being very tired this afternoon.  Pt was assisted back to bed at the end of tx session to rest. She was able to perform sit to stand transfer from w/c with Max A, however she was unable to advance BLEs and was not able to tolerate therapist assisting in advancing LEs, therefore required Total A to complete pivot transfer to the bed. Pt left resting in L sidelying position with a pillow between knees, under L arm for elevation/support, and behind her back. Pt requested assistance for dialing the phone with pt demonstrating no initiation to attempt to dial or hold the phone. Pt left resting comfortably in bed and phone propped to pt's ear. Call bell left within reach.         Outcome Measures: FIM/MMT                 MMT Initial Assessment    Right Upper Extremity  Left Upper Extremity    UE AROM  Limited against gravity all joints  Limited against gravity all joints   Shoulder flexion  2+  2+   Shoulder extension  2+  2+   Shoulder ABDuction  2+  2+   Shoulder ADDUction  2+  2+   Elbow Flexion  3  2+   Elbow Extension  3  3   Wrist Extension/Flexion  2+  2+     3+  3   0/5       No palpable muscle contraction  1/5       Palpable muscle contraction, no joint movement  2-/5      Less than full range of motion in gravity eliminated position  2/5       Able to complete full range of motion in gravity eliminated position  2+/5     Able to initiate movement against gravity  3-/5      More than half but not full range of motion against gravity  3/5       Able to complete full range of motion against gravity  3+/5     Completes full range of motion against gravity with minimal resistance  4-/5      Completes full range of motion against gravity with minimal-moderate resistance  4/5       Completes full range of motion against gravity with moderate resistance  4+/5     Completes full range of motion against gravity with moderate-maximum resistance  5/5       Completes full range of motion against gravity with maximum resistance     Sensation: Intact UEs  Coordination: Impaired BUEs      FIM SCORES Initial Assessment   Bladder - level of assist 5   Bladder - accident frequency score  NT   Bowel - level of assist 6   Bowel - accident frequency score  NT   Please see [facility] Interdisciplinary Eval: Coordination/Balance Section for details regarding FIM score description. COGNITION/PERCEPTION Initial Assessment   Premorbid Reading Status Literate   Premorbid Writing Status WFL   Arousal/Alertness Generalized responses   Orientation Level Oriented X4   Visual Fields  (Appears Intact)   Praxis     Body Scheme Cues to maintain midline in sitting, Cues to maintain midline in standing, Tactile, Verbal, Visual       COMPREHENSION MODE Initial Assessment   Primary Mode of Comprehension Auditory   Hearing Aide     Corrective Lenses Glasses   Score 5       EXPRESSION Initial Assessment   Primary Mode of Expression Verbal   Score 6   Comments         SOCIAL INTERACTION/PRAGMATICS Initial Assessment   Score 4   Comments         PROBLEM SOLVING Initial Assessment   Score 4   Comments         MEMORY Initial Assessment   Score 5   Comments         EATING Initial Assessment   Functional Level 2   Comments Pt demonstrates ability to scoop eggs using fork and bring them to her mouth with significantly increaed time and effort, however she fatigues easily and needs assistance to feed herself majority of a meal. Pt is able to hold a small cup with her R hand and bring to her mouth to drink. GROOMING Initial Assessment   Functional Level 1     Oral Hygiene FIM: 1   Tasks completed by patient Brushed teeth, Brushed hair, Washed face   Comments OT placed washcloth in pt's R hand. She was able to partially assist in washing her face with maximal encouragement. To perform oral care, OT applied toothpaste to toothbrush and pt was able to bring toothbrush to her mouth, however pt was unable to actively brush her teeth due to decreased strength and coordination. BATHING Initial Assessment   Functional Level 1   Body parts patient bathed Abdomen, Chest   Comments Pt completed full body bathing at bed level due to impaired strength, balance, and overall activity tolerance.  OT placed washcloth in pt's R hand, and she was able to very minimally attempt to wash her chest and abdomen with increased cueing and coordination. Pt required the assistance of 2 people to wash buttocks as she required Max A-Total A x1 for bed mobility while 2nd person assisted with washing. Pt unable to reach to assist in washing her legs or tyrell area. TUB/SHOWER TRANSFER INDEPENDENCE Initial Assessment   Score 0   Comments Pt not safe to perform shower transfer at this time due to impaired strength, balance, and overall activity tolerance. UPPER BODY DRESSING/UNDRESSING Initial Assessment   Functional Level 1   Items applied/Steps completed Bra (3 steps), Pullover (4 steps)   Comments Pt donned bra and t-shirt while sitting up on EOB with SBA for sitting balance and safety. Pt required Total A for threading shirt over BUEs and to pull shirt overhead due to impaired BUE AROM and strength. LOWER BODY DRESSING/UNDRESSING Initial Assessment   Functional Level 1     Sock and/or Shoe Management FIM: 1   Items applied/Steps completed Elastic waist pants (3 steps), Sock, left (1 step), Sock, right (1 step)   Comments Pt requires Total A for managing all aspects of LB dressing at bed level. Pt performed rolling with Max-Total A x1 while second person assisted with donning of brief and pants over buttocks. Pt unable to reach her feet and needs assistance for doffing/donning socks and pants over B feet. TOILETING Initial Assessment   Functional Level 2   Comments Pt completed toileting task at bed level utilizing bed pain. She needs the assistance of 2 people in order to perform all aspects of hygiene and clothing management with Max-Total A x1 for rolling.        TOILET TRANSFER INDEPENDENCE Initial Assessment   Transfer score 1   Comments Based on bed to w/c transfer, pt would require the assistance of 2 people for ant and up weightshift, balance, weightshift, RLE advance, and slow controlled descent with stand to sit to complete stand step transfer without AD to/from BSC.       INSTRUMENTAL ADL Initial Assessment (PLOF)   Meal preparation Total assistance   Homemaking Total assistance   Medicine Management Total assistance   Financial Management  (NT)      OCCUPATIONAL THERAPY PLAN OF CARE  Areas to Assess:   Self Care/Functional transfer/IADL  [X]     NMRE/ estim  [X]     UE Ther ex/Ther act  [X]     Cognitive Training  [X]     Patient/Family/Caregiver Education  [X]       Order received from MD for occupational therapy services and chart reviewed. Pt to be seen 5 times per week for 3 hours of total therapy per day for 4 weeks. Thank you for the referral.     LTGs: See Care Plan     Pt would benefit from skilled occupational therapy in order to improve independent functional mobility/ADLs,/IADLs within the home. Interventions may include range of motion (AROM, PROM B UE), motor function (B UE/ strengthening/coordination), activity tolerance (vitals, oxygen saturation levels), balance training, ADL/IADL training and functional transfer training. Please see IRC; Interdisciplinary Eval, Care Plan, and Patient Education for further information regarding occupational therapy examination and plan of care.      Virginia Scott, OTR  5/11/2017

## 2017-05-12 LAB
GLUCOSE BLD STRIP.AUTO-MCNC: 114 MG/DL (ref 70–110)
GLUCOSE BLD STRIP.AUTO-MCNC: 135 MG/DL (ref 70–110)
GLUCOSE BLD STRIP.AUTO-MCNC: 167 MG/DL (ref 70–110)
GLUCOSE BLD STRIP.AUTO-MCNC: 201 MG/DL (ref 70–110)

## 2017-05-12 PROCEDURE — 74011636637 HC RX REV CODE- 636/637: Performed by: INTERNAL MEDICINE

## 2017-05-12 PROCEDURE — 97530 THERAPEUTIC ACTIVITIES: CPT

## 2017-05-12 PROCEDURE — 82962 GLUCOSE BLOOD TEST: CPT

## 2017-05-12 PROCEDURE — 97110 THERAPEUTIC EXERCISES: CPT

## 2017-05-12 PROCEDURE — 74011250636 HC RX REV CODE- 250/636: Performed by: INTERNAL MEDICINE

## 2017-05-12 PROCEDURE — 97535 SELF CARE MNGMENT TRAINING: CPT

## 2017-05-12 PROCEDURE — 65310000000 HC RM PRIVATE REHAB

## 2017-05-12 PROCEDURE — 74011250637 HC RX REV CODE- 250/637: Performed by: INTERNAL MEDICINE

## 2017-05-12 PROCEDURE — 97150 GROUP THERAPEUTIC PROCEDURES: CPT

## 2017-05-12 RX ADMIN — ROSUVASTATIN CALCIUM 5 MG: 5 TABLET ORAL at 21:02

## 2017-05-12 RX ADMIN — GABAPENTIN 300 MG: 300 CAPSULE ORAL at 22:04

## 2017-05-12 RX ADMIN — DIVALPROEX SODIUM 250 MG: 250 TABLET, DELAYED RELEASE ORAL at 21:02

## 2017-05-12 RX ADMIN — NEPHROCAP 1 CAPSULE: 1 CAP ORAL at 08:58

## 2017-05-12 RX ADMIN — INSULIN LISPRO 4 UNITS: 100 INJECTION, SOLUTION INTRAVENOUS; SUBCUTANEOUS at 17:40

## 2017-05-12 RX ADMIN — CLINDAMYCIN HYDROCHLORIDE 300 MG: 150 CAPSULE ORAL at 13:01

## 2017-05-12 RX ADMIN — HYDROCODONE BITARTRATE AND ACETAMINOPHEN 1 TABLET: 5; 325 TABLET ORAL at 06:20

## 2017-05-12 RX ADMIN — CLINDAMYCIN HYDROCHLORIDE 300 MG: 150 CAPSULE ORAL at 00:21

## 2017-05-12 RX ADMIN — ARIPIPRAZOLE 10 MG: 10 TABLET ORAL at 08:58

## 2017-05-12 RX ADMIN — HEPARIN SODIUM 5000 UNITS: 5000 INJECTION, SOLUTION INTRAVENOUS; SUBCUTANEOUS at 22:04

## 2017-05-12 RX ADMIN — HEPARIN SODIUM 5000 UNITS: 5000 INJECTION, SOLUTION INTRAVENOUS; SUBCUTANEOUS at 06:20

## 2017-05-12 RX ADMIN — CHOLECALCIFEROL TAB 25 MCG (1000 UNIT) 1000 UNITS: 25 TAB at 08:59

## 2017-05-12 RX ADMIN — INSULIN GLARGINE 65 UNITS: 100 INJECTION, SOLUTION SUBCUTANEOUS at 20:56

## 2017-05-12 RX ADMIN — DOCUSATE SODIUM 100 MG: 100 CAPSULE, LIQUID FILLED ORAL at 08:58

## 2017-05-12 RX ADMIN — TRAZODONE HYDROCHLORIDE 100 MG: 50 TABLET ORAL at 22:04

## 2017-05-12 RX ADMIN — GABAPENTIN 300 MG: 300 CAPSULE ORAL at 06:20

## 2017-05-12 RX ADMIN — HYDROCODONE BITARTRATE AND ACETAMINOPHEN 1 TABLET: 5; 325 TABLET ORAL at 10:29

## 2017-05-12 RX ADMIN — Medication 400 MG: at 17:38

## 2017-05-12 RX ADMIN — LEVOTHYROXINE SODIUM 100 MCG: 100 TABLET ORAL at 06:34

## 2017-05-12 RX ADMIN — FAMOTIDINE 20 MG: 20 TABLET ORAL at 08:58

## 2017-05-12 RX ADMIN — HEPARIN SODIUM 5000 UNITS: 5000 INJECTION, SOLUTION INTRAVENOUS; SUBCUTANEOUS at 14:00

## 2017-05-12 RX ADMIN — CLINDAMYCIN HYDROCHLORIDE 300 MG: 150 CAPSULE ORAL at 06:20

## 2017-05-12 RX ADMIN — NAPROXEN 375 MG: 250 TABLET ORAL at 08:55

## 2017-05-12 RX ADMIN — ASPIRIN 81 MG CHEWABLE TABLET 81 MG: 81 TABLET CHEWABLE at 08:57

## 2017-05-12 RX ADMIN — DOCUSATE SODIUM 100 MG: 100 CAPSULE, LIQUID FILLED ORAL at 17:38

## 2017-05-12 RX ADMIN — NAPROXEN 375 MG: 250 TABLET ORAL at 17:35

## 2017-05-12 RX ADMIN — SERTRALINE HYDROCHLORIDE 50 MG: 50 TABLET ORAL at 08:59

## 2017-05-12 RX ADMIN — GABAPENTIN 300 MG: 300 CAPSULE ORAL at 13:13

## 2017-05-12 RX ADMIN — FAMOTIDINE 20 MG: 20 TABLET ORAL at 17:38

## 2017-05-12 RX ADMIN — CLINDAMYCIN HYDROCHLORIDE 300 MG: 150 CAPSULE ORAL at 17:37

## 2017-05-12 RX ADMIN — HYDROCODONE BITARTRATE AND ACETAMINOPHEN 1 TABLET: 5; 325 TABLET ORAL at 14:34

## 2017-05-12 RX ADMIN — HYDROCODONE BITARTRATE AND ACETAMINOPHEN 1 TABLET: 5; 325 TABLET ORAL at 21:00

## 2017-05-12 RX ADMIN — Medication 400 MG: at 08:59

## 2017-05-12 NOTE — PROGRESS NOTES
Problem: Mobility Impaired (Adult and Pediatric)  Goal: *Acute Goals and Plan of Care (Insert Text)  Physical Therapy Goals  STGs  Initiated 2017 and to be accomplished within 7 day(s) [17]  1. Patient will roll side to side in bed with moderate assistance. 2. Patient will perform supine to sit and sit to supine with moderate assistance. 3. Patient will transfer from bed to chair and chair to bed with moderate assistance using the least restrictive device. 4. Patient will perform sit to stand with moderate assistance . 5. Patient will propel w/c on level surface for 50 ft with R UE and B LEs with min A. LTGs  Initiated 2017 and to be accomplished within 4 weeks [17]  1. Patient will roll side to side in bed with contact guard assist.   2. Patient will perform supine to sit and sit to supine in bed with contact guard assistance. 3. Patient will transfer from bed to chair and chair to bed with contact guard assistance using the least restrictive device. 4. Patient will perform sit to stand with contact guard assist.  5. Patient will ambulate with minimal assistance/contact guard assist for 50 feet with the least restrictive device. 5. Patient will ascend/descend 3 stairs with bilateral handrail(s) with moderate assistance. PHYSICAL THERAPY DAILY NOTE  Patient Name:Stephen RODRIGUEZ Ernestine  Time In: 1000  Time Out: 1100  Patient Seen For: Transfer training; Wheelchair mobility (bed mobility)   Time In: 1400  Time Out: 1430  Patient Seen For: Transfer training;Balance activities  Diagnosis: Right forearm cellulits  SIRS (systemic inflammatory response syndrome) (HCC) SIRS (systemic inflammatory response syndrome) (HCC)  Precautions: fall risk     Subjective: Pt reports pain throughout body at start and received pain meds during PT session.       Pain at start of tx: 8/10 pain in B ankles during standing  Pain at stop of tx: same     Patient identified with name and : yes Objective:       BED/MAT MOBILITY Daily Assessment     Supine to Sit : 3 (Moderate assistance) (with HOB elevated)  Pt presents supine in bed and required Elida with BLE off EOM and min/modA with trunk to flex trunk into sitting with HOB elevated to ~30deg. Pt required modA to scoot fwd on EOB. TRANSFERS Daily Assessment     Transfer Type: Other  Other: stand step txfr without AD/anterior approach  Transfer Assistance : 2 (Maximal assistance)  Sit to Stand Assistance: Maximum assistance   Pt performed sit to stand from EOB at slightly elevated level with modA and therapist at anterior approach. Pt performed stand step txfr bed to w/c to L side with mod/maxA for balance and side to side wt shift with mod v/c for LE sequencing. Pt performed sit to stand from w/c to RW pushing up with only RUE on arm rest due to pain in L wrist and required maxA for anterior and up motion. In PM, Pt performed sit to stand from w/c with anterior approach and no AD with maxA for anterior and up with BUE on arm rests to A with push off. Pt performed stand step txfr w/c to mat table without AD and therapist in anterior approach with maxA for balance and wt shift with increased time to advance BLE. BALANCE Daily Assessment     Sitting - Static: Fair (occasional)  Sitting - Dynamic: Fair (occasional)  Standing - Static: Poor (+)  Standing - Dynamic : Impaired   Pt performed static standing with RW and Elida for ~30-45sec to improve standing tolerance to progress to stand step txfrs with RW. In PM, pt participated in group encounter to play table top card game while sitting EOM in order to challenge static and dynamic sitting balance, anterior wt shift for BLE wt bearing and activity tolerance. Pt tolerated full 25min card game sitting EOM with S for safety.            WHEELCHAIR MOBILITY Daily Assessment     Able to Propel (ft): 170 feet  Functional Level: 3  Curbs/Ramps Assist Required (FIM Score): 0 (Not tested)  Wheelchair Setup Assist Required : 3 (Moderate assistance)  Wheelchair Management: Manages right brake;Manages left brake (with v/c, visual cues and extender)   Pt propelled w/c from room to gym with BUE and Elida to maintain momentum and pt required significant increased time to complete distance due to B wrist pain and fatigue. Assessment: Pt demo'd improved mobility with txfr today than yesterday during eval. Pt requires increased time for all tasks due to pain, O2 dependency and generalized weakness. Plan of Care: Continue with current POC to improve independence with functional mobility and increase safety awareness.       Mayo Thornton, BALDOMERO  5/12/2017

## 2017-05-12 NOTE — PROGRESS NOTES
When  attempted to visit patient while in rehab, it was not a good time to visit. Staff was with patient during several attempts to visit. Chaplains will provide spiritual care as needed, as requested. Amilcar Brooks MDiv.   Board Certified [employer] [phone]

## 2017-05-12 NOTE — REHAB NOTE
SHIFT CHANGE NOTE FOR [facility]    Bedside and Verbal shift change report given to Anabel Yoon RN (oncoming nurse) by Priya Nava RN (offgoing nurse). Report included the following information SBAR, Kardex, MAR and Recent Results.     Situation:   Code Status: Full Code   Reason for Admission: [facility] Day: 2   Problem List:   Hospital Problems  Date Reviewed: 5/11/2017          Codes Class Noted POA    Cellulitis of right forearm ICD-10-CM: L03.113  ICD-9-CM: 682.3  5/4/2017 Yes        Olecranon bursitis of right elbow ICD-10-CM: M70.21  ICD-9-CM: 726.33  5/4/2017 Yes        Contusion of left elbow ICD-10-CM: S50.02XA  ICD-9-CM: 923.11  5/4/2017 Yes        Impaired mobility and ADLs ICD-10-CM: Z74.09  ICD-9-CM: 799.89  5/4/2017 Yes        * (Principal)SIRS (systemic inflammatory response syndrome) ([facility] ICD-10-CM: R65.10  ICD-9-CM: 995.90  5/2/2017 Yes        Right Achilles tendinitis ICD-10-CM: M76.61  ICD-9-CM: 726.71  5/2/2017 Yes        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 congestive heart failure (Chronic) ICD-10-CM: I13.10, N18.3  ICD-9-CM: 404.10, 585.3  Unknown Yes    Overview Signed 4/23/2013 10:02 AM by Santos Turner     Better controlled             Type 2 diabetes with stage 3 chronic kidney disease GFR 30-59 (HCC) (Chronic) ICD-10-CM: E11.22, N18.3  ICD-9-CM: 250.40, 585.3  Unknown Yes              Background:   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 congestive heart failure     Better controlled     Bipolar affective disorder ([facility] 12/5/2012    Cellulitis of right forearm 5/4/2017    Chronic back pain     Chronic obstructive pulmonary disease (COPD) ([facility] SOB, on paula O2 Recent admission with psychosis     CKD stage G3a/A1, GFR 45-59 and albumin creatinine ratio <30 mg/g 5/11/2017    Urine microalbumin/Creatinine ratio (5/11/2017) = 9 mg/g    Contusion of left elbow 5/4/2017    Depression     Diabetic neuropathy associated with type 2 diabetes mellitus (HCC)     Diastolic dysfunction without heart failure     Stable on diuretics     Falls frequently     Gastroesophageal reflux disease with hiatal hernia     Generalized osteoarthritis of multiple sites     History of acute renal failure 5/31/2013    History of back injury     jumped out of second story window     History of hepatitis C     treated    History of penicillin allergy     Hypothyroidism     Hypoxemia requiring supplemental oxygen 12/29/2014    Intravenous drug user 5/2/2017    Memory difficulty     Mixed connective tissue disease ([address] 5/10/2017    Obesity, Class I, BMI 30-34.9     Olecranon bursitis of right elbow 5/4/2017    Recurrent genital herpes 5/31/2013    Right Achilles tendinitis 5/2/2017    Sarcoidosis ([facility]     Sickle cell trait ([facility]     Type 2 diabetes with stage 3 chronic kidney disease GFR 30-59 ([address]     Venous insufficiency     Wears glasses       Patient taking anticoagulants yes    Patient has a defibrillator: no     Assessment:   Changes in Assessment throughout shift: no     Patient has central line: no Reasons if yes: Insertion date: Last dressing date:   Patient has Renae Cath: no Reasons if yes:     Insertion date:     Last Vitals:     Vitals:    05/11/17 1616 05/11/17 2100 05/12/17 0800 05/12/17 1600   BP:  124/80 138/77 118/74   Pulse:  67 (!) 54 63   Resp:  18 18 18   Temp: 97.7 °F (36.5 °C) 98.3 °F (36.8 °C) 97.8 °F (36.6 °C) 97.7 °F (36.5 °C)   SpO2:  92% 96% 100%   Weight:       Height:       LMP: 11/25/2012        PAIN    Pain Assessment    Pain Intensity 1: 5 (05/12/17 1542) Pain Intensity 1: 2 (12/29/14 1105)    Pain Location 1: Generalized Pain Location 1: Abdomen    Pain Intervention(s) 1: Medication (see MAR) Pain Intervention(s) 1: Medication (see MAR)  Patient Stated Pain Goal: 0 Patient Stated Pain Goal: 0  o Intervention effective: yes   o Other actions taken for pain: repositioning     Skin Assessment  Skin color Skin Color: Appropriate for ethnicity  Condition/Temperature Skin Condition/Temp: Dry  Integrity Skin Integrity: Wound (add Wound LDA)  Turgor Turgor: Non-tenting  Weekly Pressure Ulcer Documentation  Pressure  Injury Documentation: No Pressure Injury Noted-Pressure Ulcer Prevention Initiated  Wound Prevention & Protection Methods  Orientation of wound Orientation of Wound Prevention: Posterior  Location of Prevention Location of Wound Prevention: Sacrum/Coccyx  Dressing Present Dressing Present : No  Dressing Status    Wound Offloading Wound Offloading (Prevention Methods): Bed, pressure redistribution/air     INTAKE/OUPUT    Date 05/11/17 1900 - 05/12/17 0659 05/12/17 0700 - 05/13/17 0659   Shift 3425-6737 24 Hour Total 1056-6338 7135-2523 24 Hour Total   I  N  T  A  K  E   P. O.   680  680      P. O.   680  680    Shift Total  (mL/kg)   680  (7.1)  680  (7.1)   O  U  T  P  U  T   Urine  (mL/kg/hr)           Urine Occurrence(s) 3 x 4 x 2 x  2 x    Stool           Stool Occurrence(s) 0 x 0 x 0 x  0 x    Shift Total  (mL/kg)        NET   680  680   Weight (kg) 95.3 95.3 95.3 95.3 95.3       Recommendations:  1. Patient needs and requests: none    2. Diet: diabetic    3. Pending tests/procedures: am labs     4. Functional Level/Equipment: assist x 2/wheelchair    5. Estimated Discharge Date: tbd Posted on Whiteboard in Patients Room: no     HEALS Safety Check    A safety check occurred in the patient's room between off going nurse and oncoming nurse listed above.     The safety check included the below items  Area Items   H  High Alert Medications - Verify all high alert medication drips (heparin, PCA, etc.)   E  Equipment - Suction is set up for ALL patients (with lashay)  - Red plugs utilized for all equipment (IV pumps, etc.)  - WOWs wiped down at end of shift.  - Room stocked with oxygen, suction, and other unit-specific supplies   A  Alarms - Bed alarm is set for fall risk patients  - Ensure chair alarm is in place and activated if patient is up in a chair   L  Lines - Check IV for any infiltration  - Renae bag is empty if patient has a Renae   - Tubing and IV bags are labeled   S  Safety   - Room is clean, patient is clean, and equipment is clean. - Hallways are clear from equipment besides carts. - Fall bracelet on for fall risk patients  - Ensure room is clear and free of clutter  - Suction is set up for ALL patients (with lashay)  - Hallways are clear from equipment besides carts.    - Isolation precautions followed, supplies available outside room, sign posted

## 2017-05-12 NOTE — PROGRESS NOTES
[facility] PHYSICAL REHABILITATION  [address] [place] [address], [address]     INPATIENT REHABILITATION  DAILY PROGRESS NOTE     Date: 5/12/2017    Name: Kaylene Clark Age / Sex: 62 y.o. / female   CSN: [id number] MRN: [id number]   [facility] Date: 5/10/2017 Length of Stay: 2 days     Primary Rehab Diagnosis: Impaired Mobility and ADLs secondary to Systemic inflammatory response syndrome (SIRS) secondary to:  1. Cellulitis of the right forearm  2. Olecranon bursitis of the right elbow  3. Contusion of left elbow  4. Right Achilles tendonitis      Subjective:     Patient seen and examined. Patient's Complaint:   No significant medical complaints    Pain Control: stable, mild-to-moderate joint symptoms intermittently, reasonably well controlled by current meds      Objective:     Vital Signs:  Patient Vitals for the past 24 hrs:   BP Temp Pulse Resp SpO2   05/12/17 0800 138/77 97.8 °F (36.6 °C) (!) 54 18 96 %   05/11/17 2100 124/80 98.3 °F (36.8 °C) 67 18 92 %   05/11/17 1616 - 97.7 °F (36.5 °C) - - -   05/11/17 1612 124/72 97.7 °F (36.5 °C) 65 14 91 %        Physical Examination:  GENERAL SURVEY: Patient is awake, alert, oriented x 3, sitting comfortably on the chair, not in acute respiratory distress. HEENT: pink palpebral conjunctivae, anicteric sclerae, no nasoaural discharge, moist oral mucosa  NECK: supple, no jugular venous distention, no palpable lymph nodes  CHEST/LUNGS: symmetrical chest expansion, good air entry, clear breath sounds  HEART: adynamic precordium, good S1 S2, no S3, regular rhythm, no murmurs  ABDOMEN: obese, bowel sounds appreciated, soft, non-tender  EXTREMITIES: (+) left hand wrapped in gauze, pink nailbeds, (+) warmth/erythema/swelling of both elbows, (+) bipedal edema, full and equal pulses, no calf tenderness   NEUROLOGICAL EXAM: The patient is awake, alert and oriented x3, able to answer questions fairly appropriately, able to follow 1 and 2 step commands.  Able to tell time from the wall clock. Cranial nerves II-XII are grossly intact. No gross sensory deficit. Motor strength is 2+/5 on both shoulders, 3/5 on the right elbow, 3-/5 on the left elbow, 2+/5 on both wrists, 3+/5 on the right handgrip, 3/5 on the left handgrip, 3-/5 on the right hip, 3/5 on the left hip, 3-/5 on both knees and both ankles.       Current Medications:  Current Facility-Administered Medications   Medication Dose Route Frequency    magnesium oxide (MAG-OX) tablet 400 mg  400 mg Oral BID    naproxen (NAPROSYN) tablet 375 mg  375 mg Oral BID WITH MEALS    cholecalciferol (VITAMIN D3) tablet 1,000 Units  1,000 Units Oral DAILY    acetaminophen (TYLENOL) tablet 650 mg  650 mg Oral Q4H PRN    docusate sodium (COLACE) capsule 100 mg  100 mg Oral BID    bisacodyl (DULCOLAX) tablet 10 mg  10 mg Oral Q48H PRN    heparin (porcine) injection 5,000 Units  5,000 Units SubCUTAneous Q8H    insulin lispro (HUMALOG) injection   SubCUTAneous TIDAC    clindamycin (CLEOCIN) capsule 300 mg  300 mg Oral Q6H    famotidine (PEPCID) tablet 20 mg  20 mg Oral BID    [START ON 5/14/2017] eplerenone (INSPRA) tablet 25 mg  25 mg Oral DAILY    insulin glargine (LANTUS) injection 65 Units  65 Units SubCUTAneous QHS    albuterol (PROVENTIL VENTOLIN) nebulizer solution 2.5 mg  2.5 mg Nebulization Q4H PRN    divalproex DR (DEPAKOTE) tablet 250 mg  250 mg Oral QHS    ARIPiprazole (ABILIFY) tablet 10 mg  10 mg Oral DAILY    gabapentin (NEURONTIN) capsule 300 mg  300 mg Oral Q8H    traZODone (DESYREL) tablet 100 mg  100 mg Oral QHS    rosuvastatin (CRESTOR) tablet 5 mg  5 mg Oral QHS    aspirin chewable tablet 81 mg  81 mg Oral DAILY WITH BREAKFAST    levothyroxine (SYNTHROID) tablet 100 mcg  100 mcg Oral ACB    sertraline (ZOLOFT) tablet 50 mg  50 mg Oral DAILY    HYDROcodone-acetaminophen (NORCO) 5-325 mg per tablet 1 Tab  1 Tab Oral Q4H PRN    umeclidinium-vilanterol (ANORO ELLIPTA) 62.5 mcg- 25 mcg/inhalation  1 Puff Inhalation QAM RT    B complex-vitaminC-folic acid (NEPHROCAP) cap  1 Cap Oral DAILY       Allergies: Allergies   Allergen Reactions    Moxifloxacin Rash    Pcn [Penicillins] Swelling    Sulfa (Sulfonamide Antibiotics) Swelling       Lab/Data Review:  Recent Results (from the past 24 hour(s))   MICROALBUMIN, UR, RAND W/ MICROALBUMIN/CREA RATIO    Collection Time: 05/11/17  2:32 PM   Result Value Ref Range    Microalbumin,urine random 0.84 0 - 3.0 MG/DL    Creatinine, urine 90.00 30 - 125 mg/dL    Microalbumin/Creat ratio (mg/g creat) 9 0 - 30 mg/g   GLUCOSE, POC    Collection Time: 05/11/17  5:11 PM   Result Value Ref Range    Glucose (POC) 136 (H) 70 - 110 mg/dL   GLUCOSE, POC    Collection Time: 05/11/17  9:31 PM   Result Value Ref Range    Glucose (POC) 173 (H) 70 - 110 mg/dL   GLUCOSE, POC    Collection Time: 05/12/17  8:14 AM   Result Value Ref Range    Glucose (POC) 114 (H) 70 - 110 mg/dL   GLUCOSE, POC    Collection Time: 05/12/17 12:20 PM   Result Value Ref Range    Glucose (POC) 135 (H) 70 - 110 mg/dL       Estimated Glomerular Filtration Rate:  On admission, based on a Creatinine of 1.26 mg/dl:   Estimated GFR using CKD-EPI = 53.6 mL/min/1.73m2   Estimated GFR using MDRD = 55.7 mL/min/1.73m2      Assessment:     Primary Rehabilitation Diagnosis  1. Impaired Mobility and ADLs  2. Systemic inflammatory response syndrome (SIRS) secondary to:   a. Cellulitis of the right forearm   b. Olecranon bursitis of the right elbow   c. Contusion of left elbow   d.  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 heart failure    Depression    History of back injury    Anxiety    Wears glasses    History of hepatitis C    Sickle cell trait (HCC)    History of acute renal failure    Hypothyroidism    Recurrent genital herpes    Sarcoidosis ([facility]    History pf abscess of right arm    Hypoxemia requiring supplemental oxygen    Abnormal nuclear stress test    History of cellulitis and abscess of hand    History of cellulitis and abscess of foot    Intravenous drug user    History of penicillin allergy    Falls frequently    Gastroesophageal reflux disease with hiatal hernia    Memory difficulty    Mixed connective tissue disease     CKD stage G3a/A1, GFR 45-59 and albumin creatinine ratio <30 mg/g        Plan:     1. Justification for continued stay: Good progression towards established rehabilitation goals. 2. Medical Issues being followed closely:    [x]  Fall and safety precautions     [x]  Wound Care     [x]  Bowel and Bladder Function     [x]  Fluid Electrolyte and Nutrition Balance     [x]  Pain Control      3. Issues that 24 hour rehabilitation nursing is following:    [x]  Fall and safety precautions     [x]  Wound Care     [x]  Bowel and Bladder Function     [x]  Fluid Electrolyte and Nutrition Balance     [x]  Pain Control      [x]  Assistance with and education on in-room safety with transfers to and from the bed, wheelchair, toilet and shower. 4. Acute rehabilitation plan of care:    [x]  Continue current care and rehab. [x]  Physical Therapy           [x]  Occupational Therapy           []  Speech Therapy     []  Hold Rehab until further notice     5. Medications:    [x]  MAR Reviewed     [x]  Continue Present Medications     6.  DVT Prophylaxis:      []  Lovenox     [x]  Unfractionated Heparin     []  Coumadin []  NOAC     [x]  ROBERT Stockings     []  Sequential Compression Device     []  None     7.  Orders:   > Systemic inflammatory response syndrome (SIRS) secondary to Cellulitis of the right forearm, Olecranon bursitis of the right elbow, Contusion of left elbow and Right Achilles tendonitis    > Continue:    > Clindamycin 300 mg PO q 6 hr (STOP DATE: 5/13/2017)    > Diclofenac 2 grams applied to affected areas 4 times daily     > Abnormally low HDL with hypertriglyceridemia   > Continue Rosuvastatin 5 mg PO q HS     > 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 congestive heart failure    > On 5/11/2017, discontinued Furosemide 20 mg PO once daily (re: rising Creatinine)     > Bipolar affective disease   > Continue:    > Aripiprazole 10 mg PO once daily    > Divalproex  mg PO q HS     > Chronic obstructive pulmonary disease   > Continue:    > Anoro Ellipta 1 puff inhaled once daily    > Albuterol nebulization q 4 hr PRN for shortness of breath     > Depression / Anxiety   > Continue:    > Aripiprazole 10 mg PO once daily    > Sertraline 50 mg PO once daily    > Trazodone 100 mg PO q HS     > Diabetic neuropathy   > Continue Gabapentin 300 mg PO q 8 hr     > Diastolic dysfunction without heart failure   > On 5/11/2017, discontinued Furosemide 20 mg PO once daily (re: rising Creatinine)   > Eplerenone 25 mg PO once daily (to be resumed on 5/14/2017)     > Gastroesophageal reflux disease with hiatal hernia   > Continue Famotidine 20 mg PO BID     > Hypothyroidism   > Continue Levothyroxine 100 mcg PO once daily before breakfast     > Hypoxemia requiring supplemental oxygen   > Continue O2 inhalation 2 LPM via nasal cannula continuous     > Type 2 diabetes mellitus with diabetic neuropathy and stage 3 chronic kidney disease    > Prior to admission to [facility], the patient claimed she wa snot using Lantus at home; instead, she was using Insulin U500 on a sliding scale basis   > Continue:    > Lantus 65 units SC q HS    > Humalog insulin sliding scale SC TID AC only     > Mg (5/11/2017, on admission) = 1.6   > Patient was started on Mg(OH)2 400 mg PO BID   > Continue Mg(OH)2 400 mg PO BID    > CKD stage G3a/A1, GFR 45-59 and albumin creatinine ratio <30 mg/g     > On admission, based on a Creatinine of 1.26 mg/dl:    Estimated GFR using CKD-EPI = 53.6 mL/min/1.73m2    Estimated GFR using MDRD = 55.7 mL/min/1.73m2    > Urine microalbumin/Creatinine ratio (5/11/2017) = 9 mg/g    > Analgesia   > On 5/12/2017:    > Discontinued Diclofenac 2 grams applied to affected areas 4 times daily     > Started Naproxen 375 mg PO BID with meals   > Continue:    > Acetaminophen 650 mg PO q 4 hr PRN for pain level less than 5/10    > Naproxen 375 mg PO BID with meals    > Norco 5/325 1 tab PO q 4 hr PRN for pain level greater than 4/10      8. Patient's progress in rehabilitation and medical issues discussed with the patient. All questions answered to the best of my ability. Care plan discussed with patient and nurse.       Signed:    Jeniffer Conley MD    May 12, 2017

## 2017-05-12 NOTE — REHAB NOTE
[facility] PHYSICAL REHABILITATION  [address] [place] [address], [address]     INPATIENT REHABILITATION  OVERALL PLAN OF CARE    Name: Kaushal Daniels CSN: [id number]   Age: 62 y.o. MRN: [id number]   Sex: female Admit Date: 5/10/2017     Primary Rehabilitation Diagnosis  1. Impaired Mobility and ADLs  2. Systemic inflammatory response syndrome (SIRS) secondary to:   a. Cellulitis of the right forearm   b. Olecranon bursitis of the right elbow   c. Contusion of left elbow   d.  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 congestive heart failure    Depression    History of back injury    Anxiety    Wears glasses    History of hepatitis C    Sickle cell trait (HCC)    History of acute renal failure    Hypothyroidism    Recurrent genital herpes    Sarc[facility]osis ([facility]    History pf abscess of right arm    Hypoxemia requiring supplemental oxygen    Abnormal nuclear stress test    History of cellulitis and abscess of hand    History of cellulitis and abscess of foot    Intravenous drug user    History of penicillin allergy    Falls frequently    Gastroesophageal reflux disease with hiatal hernia    Memory difficulty    Mixed connective tissue disease     CKD stage G3a/A1, GFR 45-59 and albumin creatinine ratio <30 mg/g       ANTICIPATED INTERVENTIONS THAT SUPPORT THE MEDICAL NECESSITY OF THIS ADMISSION:    I. Physical Therapy              A. Intensity: 1.5 hour per day              B. Frequency: 5 times per week              C. Duration: 4 weeks              D. Long Term Goals:    1. Patient will roll side to side in bed with contact guard assist.     2. Patient will perform supine to sit and sit to supine in bed with contact guard assistance. 3. Patient will transfer from bed to chair and chair to bed with contact guard assistance using the least restrictive device. 4. Patient will perform sit to stand with contact guard assist.    5. Patient will ambulate with minimal assistance/contact guard assist for 50 feet with the least restrictive device. 6. Patient will ascend/descend 3 stairs with bilateral handrail(s) with moderate assistance. E. Interventions: Interventions may include range of motion (AROM, PROM B LE/trunk), motor function (B LE/trunk strengthening/coordination), activity tolerance (vitals, oxygen saturation levels), bed mobility training, balance activities, gait training (progressive ambulation program), and functional transfer training. II. Occupational Therapy  21 . Intensity: 1.5 hour per day              B. Frequency: 5 times per week              C. Duration: 4 weeks              D. Long Term Goals:    1. Pt will perform self-feeding with setup. 2. Pt will perform grooming with setup    3.  Pt will perform UB bathing with Min A.    4. Pt will perform LB bathing with Min A.    5. Pt will perform tub/shower transfer with Min A.     6. Pt will perform UB dressing with Min A.    7. Pt will perform LB dressing with Min A.    8. Pt will perform toileting task with Min A.    9. Pt will perform toilet transfer with Min A.   E. Interventions: Interventions may include range of motion (AROM, PROM B UE), motor function (B UE/ strengthening/coordination), activity tolerance (vitals, oxygen saturation levels), balance training, ADL/IADL training and functional transfer training. PHYSICIAN'S ASSESSMENT OF FINDINGS:    Are the established goals sufficient for achieving the optimal level of function? [x]  Yes      []  No    What changes would you recommend to the goals as written? None      Are the interventions noted sufficient for achieving the optimal level of function? [x]  Yes      []  No    What changes would you recommend to the interventions noted? If therapy staff is unable to provide 3 hr of total therapy per day in 5 days due to medical issues or decreased patient tolerance, may modify treatment schedule to 15 hr/week.       Estimated length of stay: 2 weeks      Medical rehabilitation prognosis:    []  Excellent     [x]  Good     []  Fair     []  Guarded       Discharge Destination:     [x]  Home     []  [address]     []  [facility]     []  [address]     []  [place]     []  Other:       Signed:    Moe Avila MD    May 12, 2017

## 2017-05-12 NOTE — REHAB NOTE
[facility]    Bedside and Verbal shift change report given to Kristi Mars RN (oncoming nurse) by Leonel García RN (offgoing nurse). Report included the following information SBAR, Kardex, MAR and Recent Results.     Situation:   Code Status: Full Code   Reason for Admission: [facility] Day: 2   Problem List:   Hospital Problems  Date Reviewed: 5/11/2017          Codes Class Noted POA    Cellulitis of right forearm ICD-10-CM: L03.113  ICD-9-CM: 682.3  5/4/2017 Yes        Olecranon bursitis of right elbow ICD-10-CM: M70.21  ICD-9-CM: 726.33  5/4/2017 Yes        Contusion of left elbow ICD-10-CM: S50.02XA  ICD-9-CM: 923.11  5/4/2017 Yes        Impaired mobility and ADLs ICD-10-CM: Z74.09  ICD-9-CM: 799.89  5/4/2017 Yes        * (Principal)SIRS (systemic inflammatory response syndrome) ([facility] ICD-10-CM: R65.10  ICD-9-CM: 995.90  5/2/2017 Yes        Right Achilles tendinitis ICD-10-CM: M76.61  ICD-9-CM: 726.71  5/2/2017 Yes        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 congestive heart failure (Chronic) ICD-10-CM: I13.10, N18.3  ICD-9-CM: 404.10, 585.3  Unknown Yes    Overview Signed 4/23/2013 10:02 AM by Greta Wolff     Better controlled             Type 2 diabetes with stage 3 chronic kidney disease GFR 30-59 (HCC) (Chronic) ICD-10-CM: E11.22, N18.3  ICD-9-CM: 250.40, 585.3  Unknown Yes              Background:   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 congestive heart failure     Better controlled     Bipolar affective disorder ([facility] 12/5/2012    Cellulitis of right forearm 5/4/2017    Chronic back pain     Chronic obstructive pulmonary disease (COPD) ([facility] SOB, on paula O2 Recent admission with psychosis     CKD stage G3a/A1, GFR 45-59 and albumin creatinine ratio <30 mg/g 5/11/2017    Urine microalbumin/Creatinine ratio (5/11/2017) = 9 mg/g    Contusion of left elbow 5/4/2017    Depression     Diabetic neuropathy associated with type 2 diabetes mellitus (HCC)     Diastolic dysfunction without heart failure     Stable on diuretics     Falls frequently     Gastroesophageal reflux disease with hiatal hernia     Generalized osteoarthritis of multiple sites     History of acute renal failure 5/31/2013    History of back injury     jumped out of second story window     History of hepatitis C     treated    History of penicillin allergy     Hypothyroidism     Hypoxemia requiring supplemental oxygen 12/29/2014    Intravenous drug user 5/2/2017    Memory difficulty     Mixed connective tissue disease ([address] 5/10/2017    Obesity, Class I, BMI 30-34.9     Olecranon bursitis of right elbow 5/4/2017    Recurrent genital herpes 5/31/2013    Right Achilles tendinitis 5/2/2017    Sarcoidosis ([facility]     Sickle cell trait ([facility]     Type 2 diabetes with stage 3 chronic kidney disease GFR 30-59 ([facility]     Venous insufficiency     Wears glasses       Patient taking anticoagulants yes    Patient has a defibrillator: no     Assessment:   Changes in Assessment throughout shift: no     Patient has central line: no Reasons if yes: Insertion date: Last dressing date:   Patient has Renae Cath: no Reasons if yes:     Insertion date:     Last Vitals:     Vitals:    05/11/17 0816 05/11/17 1612 05/11/17 1616 05/11/17 2100   BP:  124/72  124/80   Pulse:  65  67   Resp:  14  18   Temp:  97.7 °F (36.5 °C) 97.7 °F (36.5 °C) 98.3 °F (36.8 °C)   SpO2:  91%  92%   Weight: 95.3 kg (210 lb 1.6 oz)      Height: 5' 5\" (1.651 m)      LMP: 11/25/2012        PAIN    Pain Assessment    Pain Intensity 1: 5 (05/12/17 0655) Pain Intensity 1: 2 (12/29/14 1105)    Pain Location 1: Ankle, Wrist Pain Location 1: Abdomen    Pain Intervention(s) 1: Medication (see MAR) Pain Intervention(s) 1: Medication (see MAR)  Patient Stated Pain Goal: 0 Patient Stated Pain Goal: 0  o Intervention effective: yes   o Other actions taken for pain: repositioning     Skin Assessment  Skin color Skin Color: Appropriate for ethnicity  Condition/Temperature Skin Condition/Temp: Dry  Integrity Skin Integrity: Wound (add Wound LDA)  Turgor Turgor: Non-tenting  Weekly Pressure Ulcer Documentation  Pressure  Injury Documentation: No Pressure Injury Noted-Pressure Ulcer Prevention Initiated  Wound Prevention & Protection Methods  Orientation of wound Orientation of Wound Prevention: Posterior  Location of Prevention Location of Wound Prevention: Sacrum/Coccyx  Dressing Present Dressing Present : No  Dressing Status    Wound Offloading Wound Offloading (Prevention Methods): Bed, pressure redistribution/air     INTAKE/OUPUT    Date 05/11/17 0700 - 05/12/17 0659 05/12/17 0700 - 05/13/17 0659   Shift [id number] [id number] 24 Hour Total [id number] 2370-4827 24 Hour Total   I  N  T  A  K  E   Shift Total  (mL/kg)         O  U  T  P  U  T   Urine  (mL/kg/hr)            Urine Occurrence(s) 1 x 3 x 4 x       Stool            Stool Occurrence(s) 0 x 0 x 0 x       Shift Total  (mL/kg)         NET         Weight (kg) 95.3 95.3 95.3 95.3 95.3 95.3       Recommendations:  1. Patient needs and requests: none    2. Diet: diabetic    3. Pending tests/procedures: am labs     4. Functional Level/Equipment: assist x 2/wheelchair    5. Estimated Discharge Date: tbd Posted on Whiteboard in Patients Room: no     [facility] Safety Check    A safety check occurred in the patient's room between off going nurse and oncoming nurse listed above.     The safety check included the below items  Area Items   H  High Alert Medications - Verify all high alert medication drips (heparin, PCA, etc.)   E  Equipment - Suction is set up for ALL patients (with lashay)  - Red plugs utilized for all equipment (IV pumps, etc.)  - WOWs wiped down at end of shift.  - Room stocked with oxygen, suction, and other unit-specific supplies   A  Alarms - Bed alarm is set for fall risk patients  - Ensure chair alarm is in place and activated if patient is up in a chair   L  Lines - Check IV for any infiltration  - Renae bag is empty if patient has a Renae   - Tubing and IV bags are labeled   S  Safety   - Room is clean, patient is clean, and equipment is clean. - Hallways are clear from equipment besides carts. - Fall bracelet on for fall risk patients  - Ensure room is clear and free of clutter  - Suction is set up for ALL patients (with yanker)  - Hallways are clear from equipment besides carts.    - Isolation precautions followed, supplies available outside room, sign posted

## 2017-05-12 NOTE — INTERDISCIPLINARY ROUNDS
[facility]  [address] [place] [address], [address]    INPATIENT REHABILITATION  TEAM CONFERENCE SUMMARY     Date of Conference: 5/12/2017    Name: Quentin Sherwood Age / Sex: 62 y.o. / female   CSN: [id number] MRN: [id number]   [facility] Date: 5/10/2017 Length of Stay: 2 day     Primary Rehabilitation Diagnosis  1. Impaired Mobility and ADLs  2. Systemic inflammatory response syndrome (SIRS) secondary to:   a. Cellulitis of the right forearm   b. Olecranon bursitis of the right elbow   c. Contusion of left elbow   d.  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 heart failure    CKD stage G3a/A1, GFR 45-59 and albumin creatinine ratio <30 mg/g    Depression    History of back injury    Anxiety    Wears glasses    History of hepatitis C    Sickle cell trait (HCC)    History of acute renal failure    Hypothyroidism    Recurrent genital herpes    Sarcoidosis ([facility]    History pf abscess of right arm    Hypoxemia requiring supplemental oxygen    Abnormal nuclear stress test    History of cellulitis and abscess of hand    History of cellulitis and abscess of foot    Intravenous drug user    History of penicillin allergy    Falls frequently    Gastroesophageal reflux disease with hiatal hernia    Memory difficulty    Mixed connective tissue disease         Therapy:     FIM SCORES Initial Assessment Weekly Progress Assessment 5/12/2017   Eating Functional Level: 2  Comments: Pt demonstrates ability to scoop eggs using fork and bring them to her mouth with significantly increaed time and effort, however she fatigues easily and needs assistance to feed herself majority of a meal. Pt is able to hold a small cup with her R hand and bring to her mouth to drink. Feeding/Eating Assistance: 5 (Supervision/setup)  Comments: Pt needs assistance for setting up meal tray for opening packages and cutting food. Pt was able to valencia Sammar[place] toast with additional time and effort and bring food to her mouth. Pt utilized her hands to  sausace pieces from the plate with her fingers to feed herself. Pt also was able to hold small styrofoam cup and coffee mug for drinks this morning. 2 - Max A   Swallowing     Grooming 1  1 - Total A   Bathing 1  1 - Total A      Upper Body Dressing Functional Level: 1  Items Applied/Steps Completed: Bra (3 steps), Pullover (4 steps)  Comments: Pt donned bra and t-shirt while sitting up on EOB with SBA for sitting balance and safety. Pt required Total A for threading shirt over BUEs and to pull shirt overhead due to impaired BUE AROM and strength. 1 - Total A   Lower Body Dressing Functional Level: 1  Items Applied/Steps Completed: Elastic waist pants (3 steps), Sock, left (1 step), Sock, right (1 step)  Comments: Pt requires Total A for managing all aspects of LB dressing at bed level. Pt performed rolling with Max-Total A x1 while second person assisted with donning of brief and pants over buttocks. Pt unable to reach her feet and needs assistance for doffing/donning socks and pants over B feet.   1 - Total A   Toileting 1 - Total A  1 - Total A   Bladder - level of assist 2 3   Bladder - accident frequency score 6 6   Bowel - level of assist 3 2   Bowel - accident frequency score 6     Toilet Transfer [place] Toilet Transfer Score: 1 - Total A  1 - Total A   Tub/Shower Transfer [place] Tub or Shower Type: Tub/Shower combination  Tub/Shower Transfer Score: 0  Comments: Pt not safe to perform shower transfer at this time due to impaired strength, balance, and overall activity tolerance. 0 - Not safe   Comprehension Primary Mode of Comprehension: Auditory  Score: 4        Expression Primary Mode of Expression: Verbal  Score: 4        Social Interaction Score: 4     Problem Solving Score: 3     Memory Score: 3       FIM SCORES Initial Assessment Weekly Progress Assessment 5/12/2017   Bed/Chair/Wheelchair Transfers Transfer Type: Lateral with transfer board  Other: stand step txfr without AD/anterior approach  Transfer Assistance : 2 (Maximal assistance)  Sit to Stand Assistance: Maximum assistance (in p-bars, pull to stand)  Car Transfers: Not tested  Car Type: n/aScore: 2 (maximal assistance) with slideboard  Sit/stand score: 2 (maximal assistance) in p-bars Transfer Type:  Other  Other: stand step txfr without AD/anterior approach  Transfer Assistance : 2 (Maximal assistance)  Sit to Stand Assistance: Maximum assistancescore: 2 (maximal assistance) with slideboard and 2nd person to help hold board  Sit/stand score: 2 (max assist) in p-bars with a pull-to-stand approach   Bed Mobility Rolling Right 1 (Total assistance)1   Rolling Left 1 (Total assistance)1   Supine to Sit 1 (Total assistance)1   Sit to Stand Maximum assistance (in p-bars, pull to stand)2   Sit to Supine 1 (Total assistance)1    Rolling Right   1 (Total assistance)1 pt stiff and c/o pain and resisting movement   Rolling Left   1 (Total assistance)1   Supine to Sit   3 (Moderate assistance) (with HOB elevated)1   Sit to Stand   Maximum assistance1   Sit to Supine    1      Locomotion (W/C) Able to Propel (ft): 10 feet  Functional Level: 1  Curbs/Ramps Assist Required (FIM Score): 0 (Not tested)  Wheelchair Setup Assist Required : 2 (Maximal assistance)  Wheelchair Management: Other (comment) (needs help to manage brakes at this time)score: 1 (total assist)  Pt utilized R UE and B LE's to propel w/c 10 ft with increased time and mod assist from PT. Set-up: max assist Function 3 1- total assistance, utilizing R UE and B LE's to propel 10 ft w/ increased time and mod A from PT. Setup Assistance max Assist  3 (Moderate assistance)      Locomotion (W/C distance) 10 Feet10 ft 170 feet10 ft   Locomotion (Walk) 1 (Dependent/total assistance)1 total assist, attempted in p-bars, but unable to wt shift to advance feet due to pain in R knee/ankle and weakness   1 total assist, attempted in p-bars, but unable to wt shift to advance feet due to pain in R knee/ankle and weakness       Locomotion (Walk dist.) 0 Feet (ft)0 ft.  0 ft. Steps/Stairs Steps/Stairs Ambulated (#): 0  Level of Assist : 0 (Not tested)Not tested  not tested         Nursing:     Neuro:   A&O x__3__                   Respiratory:   [x] WNL   [] O2   [] LPM ______   Other:  Peripheral Vascular:   [] TEDS present   [] Edema present ____ Grade   Cardiac:   [x] WNL   [] Other  Genitourinary:   [x] continent   [] incontinent   [] rousseau  Abdominal _______ LBM  GI: _diabetic____ Diet ______ Liquids _____ tube feeds  Musculoskeletal: ____ ROM Transfers _____ Assistive Device Used  _mod___ Level of Assistance  Skin Integumentary:   [x] Intact   [] Not Intact   __________Preventative Measures  Details______________________________________________________________  Pain: [x] Controlled   [] Not Controlled   Pain Meds:   [] Scheduled   [] PRN        Registered Dietitian / Nutrition:     Patient Vitals for the past 100 hrs:   % Diet Eaten   05/12/17 0900 80 %   05/10/17 1839 100 %     Patient lethargic at time of visit. Noted excellent po intake most meals while in main hospital, 100% per chart documentation. 100% po intake dinner last night per chart.      Supplements:          [] Yes   [x] No      Amount of supplement consumed:  not applicable       Intake/Output Summary (Last 24 hours) at 05/12/17 1317  Last data filed at 05/12/17 0900   Gross per 24 hour   Intake              200 ml   Output                0 ml   Net              200 ml                                Last bowel movement:  5/10      Interdisciplinary Team Goals:     1. Goal  Pt will perform self-feeding with Mod A and min encouragement. Barrier  Impaired RUE ROM, Impaired strength, Pain    Intervention  Therapeutic activity, Therapeutic exercise     2. Goal  pt will perform supine to sit and sit to supine with moderate assistance to prepare for transfers training with least restrictive device. Barrier Increased pain, decreased strength, decreased AROM, impaired balance, impaired bed mobility, impaired transfers    Intervention  therex, bed mobility training, transfers training, balance training, pt education, pain relief modalities as indicated. 3. Goal      Barrier      Intervention       4. Goal  Nursing , free from falls and other injuries while in rehab    Barrier  balance, cellulitis    Intervention  Fall safety , bed and chair alarms     5. Goal      Barrier      Intervention       6. Goal  Po intake of meals will meet >75% of patient estimated nutritional needs within the next 7 days. Outcome:  [] Met/Ongoing    []  Not Met    [x] New/Initial Goal     Barrier  none known     Intervention  modified diet       Disposition / Discharge Planning: Follow-up therapy services:  tbd   DME recommendations:  tbd   Estimated discharge date:  tbd   Discharge Location:  home         Interdisciplinary team rounds were held this AM with the following team members:       Signature   Physical Therapist    Nadine Sheikh, PT, DPT   Davion Ford, BALDOMERO   Occupational Therapist    Emelyn Mcadams, OTR   Speech Therapist       Recreational Therapist    Leopold Lager, [address] [place] RN   Dietitian    Rojelio Capellan, [address]   Clinical Psychologist      Physician    Enrrique Jackson.  Eliana Palm MD    DARIA Casanova       Signed:  Sybil Stock MD    May 12, 2017

## 2017-05-12 NOTE — PROGRESS NOTES
Problem: Self Care Deficits Care Plan (Adult)  Goal: *Acute Goals and Plan of Care (Insert Text)  Long Term Goals (to be met upon discharge date) in order to increase pts functional independence and safety, and decrease burden of care:  1. Pt will perform self-feeding with setup. 2. Pt will perform grooming with setup  3. Pt will perform UB bathing with Min A.  4. Pt will perform LB bathing with Min A.  5. Pt will perform tub/shower transfer with Min A.   6. Pt will perform UB dressing with Min A.  7. Pt will perform LB dressing with Min A.  8. Pt will perform toileting task with Min A.  9. Pt will perform toilet transfer with Min A. Short Term Weekly Goals for (2017 to 2017) in order to increase pts functional independence and safety, and decrease burden of care:  1. Pt will perform self-feeding with Mod A.   2. Pt will perform grooming with Mod A.  3. Pt will perform UB bathing with Mod A.  4. Pt will perform LB bathing with Max A.  5. Pt will perform tub/shower transfer with Max A.  6. Pt will perform UB dressing with Max A.  7. Pt will perform LB dressing with Max A.  8. Pt will perform toileting task with Max A.  9. Pt will perform toilet transfer with Max A.   OCCUPATIONAL THERAPY DAILY NOTE  Patient Name:Stephen Sinha  Time Spent With Patient  Time In: 3684  Time Out: 0925      Time In: 1200  Time Out: 1230     Time In: 1430  Time Out: 1515     Medical Diagnosis:  Right forearm cellulits  SIRS (systemic inflammatory response syndrome) (HCC) SIRS (systemic inflammatory response syndrome) (HCC)      Pain at start of tx: 9/10 (Pt received medication at start of 1st and 3rd tx sessions)  Pain at stop of tx: 9/10     Patient identified with name and : yes  Subjective: Pt asks OT, \"Can you put it on the fork and then I can put it in my mouth? \" Pt able to complete task with additional time with encouragement from OT to attempt. Pt reports increased pain throughout her body in all tx sessions. Objective:      THERAPEUTIC ACTIVITY Daily Assessment     Pt sat upright in w/c at tabletop to focus on RUE functional reaching and grasping while copying pegboard pattern. Pt performed R shoulder flexion to ~60 degrees to reach to obtain pegs from bin placed on the table and was able to push them into pegboard with only slight difficulty noted. Pt required moderate verbal and visual cueing in order to accurately copy the pattern. Pt required increased effort to pull pegs out of the foam pegboard however was able to use her L hand to assist in stabilizing the board. THERAPEUTIC EXERCISE Daily Assessment     Pt sat unsupported edge of mat table to participate in BUE AROM/AAROM shoulder flexion/extension, elbow flexion/extension, and digit flexion/extension, one extremity at a time. She required Min A on RUE and Mod A on LUE to complete shoulder flexion through full ROM with increased cueing and encouragement to attempt to perform as pt often states, \"I can't lift it. Help me\" and c/o increased pain throughout. Pt able to perform full elbow flexion on BUEs and utilized 1# dumbbell for RUE. Pt limited slightly from full elbow extension and reports increased pain with stretching. Pt also performed 10 reps  utilizing 3# digiflex in R hand and AROM digit flexion/extension of L hand. Pt encouraged to perform fist pump throughout the day to promote reduction of fluid/edema in B hands and improve AROM. FEEDING/EATING Daily Assessment     Feeding/Eating  Feeding/Eating Assistance: 5 (Supervision/setup)  Comments: Pt needs assistance for setting up meal tray for opening packages and cutting food. Pt was able to valencia Persian toast with additional time and effort and bring food to her mouth. Pt utilized her hands to  sausace pieces from the plate with her fingers to feed herself. Pt also was able to hold small styrofoam cup and coffee mug for drinks this morning.        MOBILITY/TRANSFERS Daily Assessment      Pt performed stand step transfer from elevated mat table to w/c and w/c to bed with Max A for ant and up weightshift and pt attempting to utilize RUE to push up of mat/wheelchair arm rest during sit to stand. She required Mod/Max A to facilitate weightshift while standing with max cues for stepping with OT utilizing an anterior approach. Assessment: Pt demonstrates improved UE function today as she was able to feed herself and participate in RUE reaching task. She also demonstrates improved ability to advance LEs during transfers today. Plan of Care: Continue POC to maximize pt independence and safety performing ADLs and functional transfers/mobility.      Jacinto Marin, OTR  5/12/2017

## 2017-05-13 LAB
GLUCOSE BLD STRIP.AUTO-MCNC: 103 MG/DL (ref 70–110)
GLUCOSE BLD STRIP.AUTO-MCNC: 105 MG/DL (ref 70–110)
GLUCOSE BLD STRIP.AUTO-MCNC: 182 MG/DL (ref 70–110)
GLUCOSE BLD STRIP.AUTO-MCNC: 197 MG/DL (ref 70–110)

## 2017-05-13 PROCEDURE — 74011250637 HC RX REV CODE- 250/637: Performed by: INTERNAL MEDICINE

## 2017-05-13 PROCEDURE — 97542 WHEELCHAIR MNGMENT TRAINING: CPT

## 2017-05-13 PROCEDURE — 74011636637 HC RX REV CODE- 636/637: Performed by: INTERNAL MEDICINE

## 2017-05-13 PROCEDURE — 82962 GLUCOSE BLOOD TEST: CPT

## 2017-05-13 PROCEDURE — 74011250636 HC RX REV CODE- 250/636: Performed by: INTERNAL MEDICINE

## 2017-05-13 PROCEDURE — 97535 SELF CARE MNGMENT TRAINING: CPT

## 2017-05-13 PROCEDURE — 97530 THERAPEUTIC ACTIVITIES: CPT

## 2017-05-13 PROCEDURE — 65310000000 HC RM PRIVATE REHAB

## 2017-05-13 PROCEDURE — 97110 THERAPEUTIC EXERCISES: CPT

## 2017-05-13 RX ADMIN — DOCUSATE SODIUM 100 MG: 100 CAPSULE, LIQUID FILLED ORAL at 08:59

## 2017-05-13 RX ADMIN — DIVALPROEX SODIUM 250 MG: 250 TABLET, DELAYED RELEASE ORAL at 21:20

## 2017-05-13 RX ADMIN — INSULIN LISPRO 2 UNITS: 100 INJECTION, SOLUTION INTRAVENOUS; SUBCUTANEOUS at 12:29

## 2017-05-13 RX ADMIN — HYDROCODONE BITARTRATE AND ACETAMINOPHEN 1 TABLET: 5; 325 TABLET ORAL at 16:42

## 2017-05-13 RX ADMIN — INSULIN GLARGINE 65 UNITS: 100 INJECTION, SOLUTION SUBCUTANEOUS at 21:23

## 2017-05-13 RX ADMIN — ROSUVASTATIN CALCIUM 5 MG: 5 TABLET ORAL at 21:20

## 2017-05-13 RX ADMIN — HYDROCODONE BITARTRATE AND ACETAMINOPHEN 1 TABLET: 5; 325 TABLET ORAL at 21:20

## 2017-05-13 RX ADMIN — CHOLECALCIFEROL TAB 25 MCG (1000 UNIT) 1000 UNITS: 25 TAB at 08:59

## 2017-05-13 RX ADMIN — GABAPENTIN 300 MG: 300 CAPSULE ORAL at 13:58

## 2017-05-13 RX ADMIN — CLINDAMYCIN HYDROCHLORIDE 300 MG: 150 CAPSULE ORAL at 00:18

## 2017-05-13 RX ADMIN — FAMOTIDINE 20 MG: 20 TABLET ORAL at 08:59

## 2017-05-13 RX ADMIN — CLINDAMYCIN HYDROCHLORIDE 300 MG: 150 CAPSULE ORAL at 12:26

## 2017-05-13 RX ADMIN — HEPARIN SODIUM 5000 UNITS: 5000 INJECTION, SOLUTION INTRAVENOUS; SUBCUTANEOUS at 05:52

## 2017-05-13 RX ADMIN — SERTRALINE HYDROCHLORIDE 50 MG: 50 TABLET ORAL at 08:59

## 2017-05-13 RX ADMIN — HEPARIN SODIUM 5000 UNITS: 5000 INJECTION, SOLUTION INTRAVENOUS; SUBCUTANEOUS at 21:24

## 2017-05-13 RX ADMIN — GABAPENTIN 300 MG: 300 CAPSULE ORAL at 21:24

## 2017-05-13 RX ADMIN — DOCUSATE SODIUM 100 MG: 100 CAPSULE, LIQUID FILLED ORAL at 17:54

## 2017-05-13 RX ADMIN — NAPROXEN 375 MG: 250 TABLET ORAL at 17:54

## 2017-05-13 RX ADMIN — TRAZODONE HYDROCHLORIDE 100 MG: 50 TABLET ORAL at 21:20

## 2017-05-13 RX ADMIN — ASPIRIN 81 MG CHEWABLE TABLET 81 MG: 81 TABLET CHEWABLE at 08:59

## 2017-05-13 RX ADMIN — GABAPENTIN 300 MG: 300 CAPSULE ORAL at 05:35

## 2017-05-13 RX ADMIN — NEPHROCAP 1 CAPSULE: 1 CAP ORAL at 08:59

## 2017-05-13 RX ADMIN — HEPARIN SODIUM 5000 UNITS: 5000 INJECTION, SOLUTION INTRAVENOUS; SUBCUTANEOUS at 13:58

## 2017-05-13 RX ADMIN — NAPROXEN 375 MG: 250 TABLET ORAL at 08:59

## 2017-05-13 RX ADMIN — CLINDAMYCIN HYDROCHLORIDE 300 MG: 150 CAPSULE ORAL at 17:54

## 2017-05-13 RX ADMIN — FAMOTIDINE 20 MG: 20 TABLET ORAL at 17:54

## 2017-05-13 RX ADMIN — LEVOTHYROXINE SODIUM 100 MCG: 100 TABLET ORAL at 06:47

## 2017-05-13 RX ADMIN — CLINDAMYCIN HYDROCHLORIDE 300 MG: 150 CAPSULE ORAL at 05:52

## 2017-05-13 RX ADMIN — HYDROCODONE BITARTRATE AND ACETAMINOPHEN 1 TABLET: 5; 325 TABLET ORAL at 12:28

## 2017-05-13 RX ADMIN — Medication 400 MG: at 17:54

## 2017-05-13 RX ADMIN — HYDROCODONE BITARTRATE AND ACETAMINOPHEN 1 TABLET: 5; 325 TABLET ORAL at 05:33

## 2017-05-13 RX ADMIN — Medication 400 MG: at 08:59

## 2017-05-13 RX ADMIN — ARIPIPRAZOLE 10 MG: 10 TABLET ORAL at 08:59

## 2017-05-13 NOTE — PROGRESS NOTES
[facility] PHYSICAL REHABILITATION  [address] [place] [address], [address]     INPATIENT REHABILITATION  DAILY PROGRESS NOTE     Date: 5/13/2017    Name: Apryl Medrano Age / Sex: 62 y.o. / female   CSN: [id number] MRN: [id number]   [facility] Date: 5/10/2017 Length of Stay: 3 days     Primary Rehab Diagnosis: Impaired Mobility and ADLs secondary to Systemic inflammatory response syndrome (SIRS) secondary to:  1. Cellulitis of the right forearm  2. Olecranon bursitis of the right elbow  3. Contusion of left elbow  4. Right Achilles tendonitis      Subjective:     No new issues or problems reported. Blood pressure controlled but on the low side. Blood sugars fairly controlled.       Objective:     Vital Signs:  Patient Vitals for the past 24 hrs:   BP Temp Pulse Resp SpO2   05/13/17 0818 95/62 97.1 °F (36.2 °C) (!) 54 17 94 %   05/12/17 2101 128/74 98 °F (36.7 °C) 67 16 96 %   05/12/17 1600 118/74 97.7 °F (36.5 °C) 63 18 100 %        Current Medications:  Current Facility-Administered Medications   Medication Dose Route Frequency    magnesium oxide (MAG-OX) tablet 400 mg  400 mg Oral BID    naproxen (NAPROSYN) tablet 375 mg  375 mg Oral BID WITH MEALS    cholecalciferol (VITAMIN D3) tablet 1,000 Units  1,000 Units Oral DAILY    acetaminophen (TYLENOL) tablet 650 mg  650 mg Oral Q4H PRN    docusate sodium (COLACE) capsule 100 mg  100 mg Oral BID    bisacodyl (DULCOLAX) tablet 10 mg  10 mg Oral Q48H PRN    heparin (porcine) injection 5,000 Units  5,000 Units SubCUTAneous Q8H    insulin lispro (HUMALOG) injection   SubCUTAneous TIDAC    clindamycin (CLEOCIN) capsule 300 mg  300 mg Oral Q6H    famotidine (PEPCID) tablet 20 mg  20 mg Oral BID    [START ON 5/14/2017] eplerenone (INSPRA) tablet 25 mg  25 mg Oral DAILY    insulin glargine (LANTUS) injection 65 Units  65 Units SubCUTAneous QHS    albuterol (PROVENTIL VENTOLIN) nebulizer solution 2.5 mg  2.5 mg Nebulization Q4H PRN    divalproex DR (DEPAKOTE) tablet 250 mg  250 mg Oral QHS    ARIPiprazole (ABILIFY) tablet 10 mg  10 mg Oral DAILY    gabapentin (NEURONTIN) capsule 300 mg  300 mg Oral Q8H    traZODone (DESYREL) tablet 100 mg  100 mg Oral QHS    rosuvastatin (CRESTOR) tablet 5 mg  5 mg Oral QHS    aspirin chewable tablet 81 mg  81 mg Oral DAILY WITH BREAKFAST    levothyroxine (SYNTHROID) tablet 100 mcg  100 mcg Oral ACB    sertraline (ZOLOFT) tablet 50 mg  50 mg Oral DAILY    HYDROcodone-acetaminophen (NORCO) 5-325 mg per tablet 1 Tab  1 Tab Oral Q4H PRN    umeclidinium-vilanterol (ANORO ELLIPTA) 62.5 mcg- 25 mcg/inhalation  1 Puff Inhalation QAM RT    B complex-vitaminC-folic acid (NEPHROCAP) cap  1 Cap Oral DAILY       Allergies: Allergies   Allergen Reactions    Moxifloxacin Rash    Pcn [Penicillins] Swelling    Sulfa (Sulfonamide Antibiotics) Swelling       Lab/Data Review:  Recent Results (from the past 24 hour(s))   GLUCOSE, POC    Collection Time: 05/12/17  4:34 PM   Result Value Ref Range    Glucose (POC) 201 (H) 70 - 110 mg/dL   GLUCOSE, POC    Collection Time: 05/12/17  8:22 PM   Result Value Ref Range    Glucose (POC) 167 (H) 70 - 110 mg/dL   GLUCOSE, POC    Collection Time: 05/13/17  8:58 AM   Result Value Ref Range    Glucose (POC) 105 70 - 110 mg/dL   GLUCOSE, POC    Collection Time: 05/13/17 12:04 PM   Result Value Ref Range    Glucose (POC) 197 (H) 70 - 110 mg/dL       Estimated Glomerular Filtration Rate:  On admission, based on a Creatinine of 1.26 mg/dl:   Estimated GFR using CKD-EPI = 53.6 mL/min/1.73m2   Estimated GFR using MDRD = 55.7 mL/min/1.73m2      Assessment:     Primary Rehabilitation Diagnosis  1. Impaired Mobility and ADLs  2. Systemic inflammatory response syndrome (SIRS) secondary to:   a. Cellulitis of the right forearm   b. Olecranon bursitis of the right elbow   c. Contusion of left elbow   d.  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 heart failure    Depression    History of back injury    Anxiety    Wears glasses    History of hepatitis C    Sickle cell trait (HCC)    History of acute renal failure    Hypothyroidism    Recurrent genital herpes    Sarcoidosis ([facility]    History pf abscess of right arm    Hypoxemia requiring supplemental oxygen    Abnormal nuclear stress test    History of cellulitis and abscess of hand    History of cellulitis and abscess of foot    Intravenous drug user    History of penicillin allergy    Falls frequently    Gastroesophageal reflux disease with hiatal hernia    Memory difficulty    Mixed connective tissue disease     CKD stage G3a/A1, GFR 45-59 and albumin creatinine ratio <30 mg/g        Plan:     1. Justification for continued stay: Good progression towards established rehabilitation goals. 2. Medical Issues being followed closely:    [x]  Fall and safety precautions     [x]  Wound Care     [x]  Bowel and Bladder Function     [x]  Fluid Electrolyte and Nutrition Balance     [x]  Pain Control      3. Issues that 24 hour rehabilitation nursing is following:    [x]  Fall and safety precautions     [x]  Wound Care     [x]  Bowel and Bladder Function     [x]  Fluid Electrolyte and Nutrition Balance     [x]  Pain Control      [x]  Assistance with and education on in-room safety with transfers to and from the bed, wheelchair, toilet and shower.       4. Acute rehabilitation plan of care:    [x]  Continue current care and rehab. [x]  Physical Therapy           [x]  Occupational Therapy           []  Speech Therapy     []  Hold Rehab until further notice     5. Medications:    [x]  MAR Reviewed     [x]  Continue Present Medications     6. DVT Prophylaxis:      []  Lovenox     [x]  Unfractionated Heparin     []  Coumadin     []  NOAC     [x]  ROBERT Stockings     []  Sequential Compression Device     []  None     7.  Orders:   > Systemic inflammatory response syndrome (SIRS) secondary to Cellulitis of the right forearm, Olecranon bursitis of the right elbow, Contusion of left elbow and Right Achilles tendonitis    > Continue:    > Clindamycin 300 mg PO q 6 hr (STOP DATE: 5/13/2017)    > Diclofenac 2 grams applied to affected areas 4 times daily     > Abnormally low HDL with hypertriglyceridemia   > Continue Rosuvastatin 5 mg PO q HS     > 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 congestive heart failure    > On 5/11/2017, discontinued Furosemide 20 mg PO once daily (re: rising Creatinine)     > Bipolar affective disease   > Continue:    > Aripiprazole 10 mg PO once daily    > Divalproex  mg PO q HS     > Chronic obstructive pulmonary disease   > Continue:    > Anoro Ellipta 1 puff inhaled once daily    > Albuterol nebulization q 4 hr PRN for shortness of breath     > Depression / Anxiety   > Continue:    > Aripiprazole 10 mg PO once daily    > Sertraline 50 mg PO once daily    > Trazodone 100 mg PO q HS     > Diabetic neuropathy   > Continue Gabapentin 300 mg PO q 8 hr     > Diastolic dysfunction without heart failure   > On 5/11/2017, discontinued Furosemide 20 mg PO once daily (re: rising Creatinine)   > Eplerenone 25 mg PO once daily (to be resumed on 5/14/2017)     > Gastroesophageal reflux disease with hiatal hernia   > Continue Famotidine 20 mg PO BID     > Hypothyroidism   > Continue Levothyroxine 100 mcg PO once daily before breakfast     > Hypoxemia requiring supplemental oxygen   > Continue O2 inhalation 2 LPM via nasal cannula continuous     > Type 2 diabetes mellitus with diabetic neuropathy and stage 3 chronic kidney disease    > Prior to admission to [facility], the patient claimed she wa snot using Lantus at home; instead, she was using Insulin U500 on a sliding scale basis   > Continue:    > Lantus 65 units SC q HS    > Humalog insulin sliding scale SC TID AC only     > Mg (5/11/2017, on admission) = 1.6   > Patient was started on Mg(OH)2 400 mg PO BID   > Continue Mg(OH)2 400 mg PO BID    > CKD stage G3a/A1, GFR 45-59 and albumin creatinine ratio <30 mg/g     > On admission, based on a Creatinine of 1.26 mg/dl:    Estimated GFR using CKD-EPI = 53.6 mL/min/1.73m2    Estimated GFR using MDRD = 55.7 mL/min/1.73m2    > Urine microalbumin/Creatinine ratio (5/11/2017) = 9 mg/g    > Mixed connective tissue disease    > Anti-RNP (5/8/2017) = 4.3   > JOY (5/8/2017) = Negative   > Impression of Dr. Nan Osgood was early mixed connective tissue disease.    > Upon discharge from the ARU, patient is to follow-up with Rheumatology (Dr. Margarita Flores) for a Plaquenil eye exam    > Analgesia   > On 5/12/2017:    > Discontinued Diclofenac 2 grams applied to affected areas 4 times daily     > Started Naproxen 375 mg PO BID with meals   > Continue:    > Acetaminophen 650 mg PO q 4 hr PRN for pain level less than 5/10    > Naproxen 375 mg PO BID with meals    > Norco 5/325 1 tab PO q 4 hr PRN for pain level greater than 4/10      Signed:    Mohan Thompson MD    May 13, 2017

## 2017-05-13 NOTE — PROGRESS NOTES
CHART REVIEWED AND PT. IS AFEBRILE,VSS, AND PROGRESSING WELL WITH ADLS. DATA  SO FAR SUGGEST MIXED CONNECTIVE TISSUE DISEASE.   UPON DISCHARGE WILL ASK PT. TO GET A PLAQUENIL EYE EXAM.     Taylor Cotto M.D.,F.A.C.R.  05-

## 2017-05-13 NOTE — REHAB NOTE
[facility]    Bedside and Verbal shift change report given to Rachel Chicas (oncoming nurse) by Rubia Good, RN   (offgoing nurse). Report included the following information SBAR, Kardex, MAR and Recent Results.     Situation:   Code Status: Full Code   Reason for Admission: [facility] Day: 3   Problem List:   Hospital Problems  Date Reviewed: 5/13/2017          Codes Class Noted POA    Cellulitis of right forearm ICD-10-CM: L03.113  ICD-9-CM: 682.3  5/4/2017 Yes        Olecranon bursitis of right elbow ICD-10-CM: M70.21  ICD-9-CM: 726.33  5/4/2017 Yes        Contusion of left elbow ICD-10-CM: S50.02XA  ICD-9-CM: 923.11  5/4/2017 Yes        Impaired mobility and ADLs ICD-10-CM: Z74.09  ICD-9-CM: 799.89  5/4/2017 Yes        * (Principal)SIRS (systemic inflammatory response syndrome) (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 congestive heart failure (Chronic) ICD-10-CM: I13.10, N18.3  ICD-9-CM: 404.10, 585.3  Unknown Yes    Overview Signed 4/23/2013 10:02 AM by César Middleton     Better controlled             Type 2 diabetes with stage 3 chronic kidney disease GFR 30-59 ([facility] (Chronic) ICD-10-CM: E11.22, N18.3  ICD-9-CM: 250.40, 585.3  Unknown Yes              Background:   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 congestive heart failure     Better controlled     Bipolar affective disorder ([facility] 12/5/2012    Cellulitis of right forearm 5/4/2017    Chronic back pain     Chronic obstructive pulmonary disease (COPD) ([facility] SOB, on paula O2 Recent admission with psychosis     CKD stage G3a/A1, GFR 45-59 and albumin creatinine ratio <30 mg/g 5/11/2017    Urine microalbumin/Creatinine ratio (5/11/2017) = 9 mg/g    Contusion of left elbow 5/4/2017    Depression     Diabetic neuropathy associated with type 2 diabetes mellitus (HCC)     Diastolic dysfunction without heart failure     Stable on diuretics     Falls frequently     Gastroesophageal reflux disease with hiatal hernia     Generalized osteoarthritis of multiple sites     History of acute renal failure 5/31/2013    History of back injury     jumped out of second story window     History of hepatitis C     treated    History of penicillin allergy     Hypothyroidism     Hypoxemia requiring supplemental oxygen 12/29/2014    Intravenous drug user 5/2/2017    Memory difficulty     Mixed connective tissue disease ([address] 5/10/2017    Obesity, Class I, BMI 30-34.9     Olecranon bursitis of right elbow 5/4/2017    Recurrent genital herpes 5/31/2013    Right Achilles tendinitis 5/2/2017    Sarcoidosis ([facility]     Sickle cell trait ([facility]     Type 2 diabetes with stage 3 chronic kidney disease GFR 30-59 ([facility]     Venous insufficiency     Wears glasses       Patient taking anticoagulants yes    Patient has a defibrillator: no     Assessment:   Changes in Assessment throughout shift: no     Patient has central line: no Reasons if yes: Insertion date: Last dressing date:   Patient has Renae Cath: no Reasons if yes:     Insertion date:     Last Vitals:     Vitals:    05/12/17 1600 05/12/17 2101 05/13/17 0818 05/13/17 1600   BP: 118/74 128/74 95/62 131/79   Pulse: 63 67 (!) 54 61   Resp: 18 16 17 17   Temp: 97.7 °F (36.5 °C) 98 °F (36.7 °C) 97.1 °F (36.2 °C) 97.1 °F (36.2 °C)   SpO2: 100% 96% 94% 97%   Weight:       Height:       LMP: 11/25/2012        PAIN    Pain Assessment    Pain Intensity 1: 3 (05/13/17 1710) Pain Intensity 1: 2 (12/29/14 1105)    Pain Location 1: Knee, Ankle, Elbow Pain Location 1: Abdomen    Pain Intervention(s) 1: Medication (see MAR) Pain Intervention(s) 1: Medication (see MAR)  Patient Stated Pain Goal: 0 Patient Stated Pain Goal: 0  o Intervention effective: yes   o Other actions taken for pain: repositioning     Skin Assessment  Skin color Skin Color: Appropriate for ethnicity  Condition/Temperature Skin Condition/Temp: Dry, Warm  Integrity Skin Integrity: Wound (add Wound LDA)  Turgor Turgor: Non-tenting  Weekly Pressure Ulcer Documentation  Pressure  Injury Documentation: No Pressure Injury Noted-Pressure Ulcer Prevention Initiated  Wound Prevention & Protection Methods  Orientation of wound Orientation of Wound Prevention: Posterior  Location of Prevention Location of Wound Prevention: Sacrum/Coccyx  Dressing Present Dressing Present : No  Dressing Status    Wound Offloading Wound Offloading (Prevention Methods): Bed, pressure redistribution/air     INTAKE/OUPUT    Date 05/12/17 1900 - 05/13/17 0659 05/13/17 0700 - 05/14/17 0659   Shift 0401-7801 24 Hour Total 2057-3926 4973-1397 24 Hour Total   I  N  T  A  K  E   P. O.  680 440 180 620      P. O.  680 440 180 620    Shift Total  (mL/kg)  680  (7.1) 440  (4.6) 180  (1.9) 620  (6.5)   O  U  T  P  U  T   Urine  (mL/kg/hr)           Urine Occurrence(s) 2 x 4 x 3 x  3 x    Stool           Stool Occurrence(s) 0 x 0 x 0 x  0 x    Shift Total  (mL/kg)        NET  680 440 180 620   Weight (kg) 95.3 95.3 95.3 95.3 95.3       Recommendations:  1. Patient needs and requests: none    2. Diet: diabetic    3. Pending tests/procedures: am labs     4. Functional Level/Equipment: assist x 2/wheelchair    5. Estimated Discharge Date: tbd Posted on Whiteboard in Patients Room: no     HEALS Safety Check    A safety check occurred in the patient's room between off going nurse and oncoming nurse listed above.     The safety check included the below items  Area Items   H  High Alert Medications - Verify all high alert medication drips (heparin, PCA, etc.)   E  Equipment - Suction is set up for ALL patients (with lashay)  - Red plugs utilized for all equipment (IV pumps, etc.)  - WOWs wiped down at end of shift.  - Room stocked with oxygen, suction, and other unit-specific supplies   A  Alarms - Bed alarm is set for fall risk patients  - Ensure chair alarm is in place and activated if patient is up in a chair   L  Lines - Check IV for any infiltration  - Renae bag is empty if patient has a Renae   - Tubing and IV bags are labeled   S  Safety   - Room is clean, patient is clean, and equipment is clean. - Hallways are clear from equipment besides carts. - Fall bracelet on for fall risk patients  - Ensure room is clear and free of clutter  - Suction is set up for ALL patients (with lashay)  - Hallways are clear from equipment besides carts.    - Isolation precautions followed, supplies available outside room, sign posted   Trini Davis RN

## 2017-05-13 NOTE — PROGRESS NOTES
Problem: Self Care Deficits Care Plan (Adult)  Goal: *Acute Goals and Plan of Care (Insert Text)  Long Term Goals (to be met upon discharge date) in order to increase pts functional independence and safety, and decrease burden of care:  1. Pt will perform self-feeding with setup. 2. Pt will perform grooming with setup  3. Pt will perform UB bathing with Min A.  4. Pt will perform LB bathing with Min A.  5. Pt will perform tub/shower transfer with Min A.   6. Pt will perform UB dressing with Min A.  7. Pt will perform LB dressing with Min A.  8. Pt will perform toileting task with Min A.  9. Pt will perform toilet transfer with Min A. Short Term Weekly Goals for (2017 to 2017) in order to increase pts functional independence and safety, and decrease burden of care:  1. Pt will perform self-feeding with Mod A.   2. Pt will perform grooming with Mod A.  3. Pt will perform UB bathing with Mod A.  4. Pt will perform LB bathing with Max A.  5. Pt will perform tub/shower transfer with Max A.  6. Pt will perform UB dressing with Max A.  7. Pt will perform LB dressing with Max A.  8. Pt will perform toileting task with Max A.  9. Pt will perform toilet transfer with Max A.   OCCUPATIONAL THERAPY DAILY NOTE  Patient Name:Stephen Sinha        Medical Diagnosis:  Right forearm cellulits  SIRS (systemic inflammatory response syndrome) (HCC) SIRS (systemic inflammatory response syndrome) (HCC)      Pain at start of tx:0     Pain at stop of tx:0     Patient identified with name and :yes  Subjective: \"I feel pretty good today. \"     Objective:      FEEDING/EATING Daily Assessment     Feeding/Eating  Feeding/Eating Assistance: 5 (Supervision/setup)  Comments: Tray setup with opening containers & she fed herself       GROOMING Daily Assessment     Grooming  Grooming Assistance : 4 (Minimal assistance)  Comments: Assisted with opening items, setup at the sink, piched the back of her hair, turned water on & off her as she sat in wc at the sink          UPPER BODY DRESSING Daily Assessment     Upper Body Dressing   Dressing Assistance : 3 (Moderate assistance)  Comments: pullover shirt; she needed assistance with placing her arms into the sleeves & over her head       LOWER BODY DRESSING Daily Assessment     Lower Body Dressing   Dressing Assistance : 1 (Total assistance)  Position Performed: Seated edge of bed;Standing  Comments: used gaitbelt & she stablized holding the walker as OT pulled her pants over her hips       MOBILITY/TRANSFERS Daily Assessment      Max A with gait belt to & from the bed & wc      Assessment: Good tx participation & motivation noted. Reviewed hand ROM exercises with her for her B hands which she demonstrated in her room. After am ADLs, she wanted breakfast in the dining room. Plan of Care: Continue POC to maximize function & meet her goals.      Martín Baker OTR/L  5/13/2017

## 2017-05-13 NOTE — PROGRESS NOTES
Problem: Mobility Impaired (Adult and Pediatric)  Goal: *Acute Goals and Plan of Care (Insert Text)  Physical Therapy Goals  STGs  Initiated 5/11/2017 and to be accomplished within 7 day(s) [5/18/17]  1. Patient will roll side to side in bed with moderate assistance. 2. Patient will perform supine to sit and sit to supine with moderate assistance. 3. Patient will transfer from bed to chair and chair to bed with moderate assistance using the least restrictive device. 4. Patient will perform sit to stand with moderate assistance . 5. Patient will propel w/c on level surface for 50 ft with R UE and B LEs with min A. LTGs  Initiated 5/11/2017 and to be accomplished within 4 weeks [6/8/17]  1. Patient will roll side to side in bed with contact guard assist.   2. Patient will perform supine to sit and sit to supine in bed with contact guard assistance. 3. Patient will transfer from bed to chair and chair to bed with contact guard assistance using the least restrictive device. 4. Patient will perform sit to stand with contact guard assist.  5. Patient will ambulate with minimal assistance/contact guard assist for 50 feet with the least restrictive device. 5. Patient will ascend/descend 3 stairs with bilateral handrail(s) with moderate assistance. PHYSICAL THERAPY DAILY NOTE  Patient Name:Stephen Sinha  Time In: 3562  Time Out: 9001  Patient Seen For: Patient education;Balance activities; Therapeutic exercise;Transfer training; Wheelchair mobility  Diagnosis: Right forearm cellulits  SIRS (systemic inflammatory response syndrome) (HCC) SIRS (systemic inflammatory response syndrome) (HCC)  Precautions: Fall Risk Precautions, 2 LPM O2 via nasal cannula     Subjective: Pt is pleasant and cooperative throughout treatment session. However, pt appears fatigued/lethargic toward end of treatment session with pt reporting, \"I take 11 medications,\" and indicating she feels this is the cause of her lethargy. \"  Pt reports some fear/anxiety with mobility indicating that this may be a barrier. Pain at start of tx: 7/10 L UE, R ankle/foot  Pain at stop of tx: 7/10 L UE, R ankle/foot     Patient identified with name and : yes         Objective:       TRANSFERS Daily Assessment     Other: Stand Step without AD  Transfer Assistance : 2 (Maximal assistance)  Sit to Stand Assistance: Maximum assistance   Pt demonstrates difficulty achieving lift off from w/c with pt requiring increased time, education, and demonstration as well as max assist to unweight hips to scoot forward in w/c. Pt also required max assist to initiate lift off for sit to stand but once pt achieved 1/4 stand was able to complete transition to standing with min to mod assist.  Pt with tremors/shaking B LEs and trunk during transfer and in standing. Pt performed stand step transfer from w/c to mat requiring mod to max assist for trunk control, weight shifting and B LE advancement 2/2 fwd flexed posture despite max manual cues. Pt then required min assist for slow controlled descent from stand to sit. Pt then participated in sit <> stand training from edge of elevated mat table with pt performing 5 repetitions (with increased time) with max education re: efficient movement pattern and B LE placement to initiate transfer as well as min assist and mod to max manual cues for ant and up weight shift with sit to stand and for increased hip flexion and slow controlled descent with stand to sit. Pt continues to demonstrate intermittent tremoring/shaking with pt indicating she feels it is related to fear. Amount of tremors decreased within session. After training and seated exercises as described below, the pt performed a return transfer from elevated mat table to w/c with minimal assistance throughout for weight shifting with sit <> stand and for weight-shifting while advancing LEs to step.           BALANCE Daily Assessment     Sitting - Static: Good (unsupported)  Sitting - Dynamic: Fair (occasional)  Standing - Static: Poor  Standing - Dynamic : Impaired   Pt stood without UE support for approximately 3 minutes while PT assisted pt with clothing adjustment. Pt required CGA to min assist for balance with one post LOB noted requiring min to mod assist to correct to maintain COM over SERA. WHEELCHAIR MOBILITY Daily Assessment     Able to Propel (ft): 50 feet  Functional Level: 2   Pt requires minimal to moderate assistance for path negotiation while propelling w/c with B UEs with max education re: efficient propulsion. LOWER EXTREMITY EXERCISES Daily Assessment     Extremity: Both  Exercise Type #1: Seated lower extremity strengthening  Sets Performed: 2  Reps Performed: 10   Isometric Hip Add x 5\" holds  Hip Abd/ER with red theraband resistance  Sets Performed: 1  Reps Performed: 10  H/S curls with red theraband resistance  Pt performed seated therex unsupported at EOM in front of mirror for visual feedback with intermittent verbal cues for neutral spinal posture and B hand placement on B thighs to address trunk control and stability in sitting. Assessment: Pt is progressing with functional mobility requiring decreased assistance within session from max assist for stand step transfer to min assist for stand step transfer. However, pt continues to demonstrate functional weakness, balance impairment, and fear of falling and would benefit from continued skilled intervention to promote return to PLOF. Plan of Care: Continue with current POC. Progress functional transfer training to promote increased confidence with standing and sit <> stand transfers as well as address functional weakness and promote increased safety and independence.      Billy Garcia, PT, DPT  5/13/2017

## 2017-05-13 NOTE — REHAB NOTE
[facility]    Bedside and Verbal shift change report given to Chandu Gill RN (oncoming nurse) by Babs Bentley RN (offgoing nurse). Report included the following information SBAR, Kardex, MAR and Recent Results.     Situation:   Code Status: Full Code   Reason for Admission: [address] Hospital Day: 3   Problem List:   Hospital Problems  Date Reviewed: 5/11/2017          Codes Class Noted POA    Cellulitis of right forearm ICD-10-CM: L03.113  ICD-9-CM: 682.3  5/4/2017 Yes        Olecranon bursitis of right elbow ICD-10-CM: M70.21  ICD-9-CM: 726.33  5/4/2017 Yes        Contusion of left elbow ICD-10-CM: S50.02XA  ICD-9-CM: 923.11  5/4/2017 Yes        Impaired mobility and ADLs ICD-10-CM: Z74.09  ICD-9-CM: 799.89  5/4/2017 Yes        * (Principal)SIRS (systemic inflammatory response syndrome) ([facility] ICD-10-CM: R65.10  ICD-9-CM: 995.90  5/2/2017 Yes        Right Achilles tendinitis ICD-10-CM: M76.61  ICD-9-CM: 726.71  5/2/2017 Yes        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 congestive heart failure (Chronic) ICD-10-CM: I13.10, N18.3  ICD-9-CM: 404.10, 585.3  Unknown Yes    Overview Signed 4/23/2013 10:02 AM by Osvaldo Beckett     Better controlled             Type 2 diabetes with stage 3 chronic kidney disease GFR 30-59 ([facility] (Chronic) ICD-10-CM: E11.22, N18.3  ICD-9-CM: 250.40, 585.3  Unknown Yes              Background:   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 congestive heart failure     Better controlled     Bipolar affective disorder ([facility] 12/5/2012    Cellulitis of right forearm 5/4/2017    Chronic back pain     Chronic obstructive pulmonary disease (COPD) ([facility]     SOB, on paula O2 Recent admission with psychosis     CKD stage G3a/A1, GFR 45-59 and albumin creatinine ratio <30 mg/g 5/11/2017    Urine microalbumin/Creatinine ratio (5/11/2017) = 9 mg/g    Contusion of left elbow 5/4/2017    Depression     Diabetic neuropathy associated with type 2 diabetes mellitus (HCC)     Diastolic dysfunction without heart failure     Stable on diuretics     Falls frequently     Gastroesophageal reflux disease with hiatal hernia     Generalized osteoarthritis of multiple sites     History of acute renal failure 5/31/2013    History of back injury     jumped out of second story window     History of hepatitis C     treated    History of penicillin allergy     Hypothyroidism     Hypoxemia requiring supplemental oxygen 12/29/2014    Intravenous drug user 5/2/2017    Memory difficulty     Mixed connective tissue disease ([facility] 5/10/2017    Obesity, Class I, BMI 30-34.9     Olecranon bursitis of right elbow 5/4/2017    Recurrent genital herpes 5/31/2013    Right Achilles tendinitis 5/2/2017    Sarcoidosis ([facility]     Sickle cell trait ([facility]     Type 2 diabetes with stage 3 chronic kidney disease GFR 30-59 ([facility]     Venous insufficiency     Wears glasses       Patient taking anticoagulants yes    Patient has a defibrillator: no     Assessment:   Changes in Assessment throughout shift: no     Patient has central line: no Reasons if yes: Insertion date: Last dressing date:   Patient has Renae Cath: no Reasons if yes:     Insertion date:     Last Vitals:     Vitals:    05/11/17 2100 05/12/17 0800 05/12/17 1600 05/12/17 2101   BP: 124/80 138/77 118/74 128/74   Pulse: 67 (!) 54 63 67   Resp: 18 18 18 16   Temp: 98.3 °F (36.8 °C) 97.8 °F (36.6 °C) 97.7 °F (36.5 °C) 98 °F (36.7 °C)   SpO2: 92% 96% 100% 96%   Weight:       Height:       LMP: 11/25/2012        PAIN    Pain Assessment    Pain Intensity 1: 3 (05/12/17 2200) Pain Intensity 1: 2 (12/29/14 1105)    Pain Location 1: Generalized Pain Location 1: Abdomen    Pain Intervention(s) 1: Medication (see MAR) Pain Intervention(s) 1: Medication (see MAR)  Patient Stated Pain Goal: 0 Patient Stated Pain Goal: 0  o Intervention effective: yes   o Other actions taken for pain: repositioning     Skin Assessment  Skin color Skin Color: Appropriate for ethnicity  Condition/Temperature Skin Condition/Temp: Dry  Integrity Skin Integrity: Wound (add Wound LDA)  Turgor Turgor: Non-tenting  Weekly Pressure Ulcer Documentation  Pressure  Injury Documentation: No Pressure Injury Noted-Pressure Ulcer Prevention Initiated  Wound Prevention & Protection Methods  Orientation of wound Orientation of Wound Prevention: Posterior  Location of Prevention Location of Wound Prevention: Sacrum/Coccyx  Dressing Present Dressing Present : No  Dressing Status    Wound Offloading Wound Offloading (Prevention Methods): Bed, pressure redistribution/air     INTAKE/OUPUT    Date 05/12/17 0700 - 05/13/17 0659 05/13/17 0700 - 05/14/17 0659   Shift 9493-5841 4466-1657 24 Hour Total 0526-7690 4495-3104 24 Hour Total   I  N  T  A  K  E   P. O. 680  680         P. O. 680  680       Shift Total  (mL/kg) 680  (7.1)  680  (7.1)      O  U  T  P  U  T   Urine  (mL/kg/hr)            Urine Occurrence(s) 2 x 2 x 4 x       Stool            Stool Occurrence(s) 0 x 0 x 0 x       Shift Total  (mL/kg)           680      Weight (kg) 95.3 95.3 95.3 95.3 95.3 95.3       Recommendations:  1. Patient needs and requests: none    2. Diet: diabetic    3. Pending tests/procedures: am labs     4. Functional Level/Equipment: assist x 2/wheelchair    5. Estimated Discharge Date: tbd Posted on Whiteboard in Patients Room: no     HEALS Safety Check    A safety check occurred in the patient's room between off going nurse and oncoming nurse listed above.     The safety check included the below items  Area Items   H  High Alert Medications - Verify all high alert medication drips (heparin, PCA, etc.)   E  Equipment - Suction is set up for ALL patients (with lashay)  - Red plugs utilized for all equipment (IV pumps, etc.)  - WOWs wiped down at end of shift.  - Room stocked with oxygen, suction, and other unit-specific supplies   A  Alarms - Bed alarm is set for fall risk patients  - Ensure chair alarm is in place and activated if patient is up in a chair   L  Lines - Check IV for any infiltration  - Renae bag is empty if patient has a Renae   - Tubing and IV bags are labeled   S  Safety   - Room is clean, patient is clean, and equipment is clean. - Hallways are clear from equipment besides carts. - Fall bracelet on for fall risk patients  - Ensure room is clear and free of clutter  - Suction is set up for ALL patients (with lashay)  - Hallways are clear from equipment besides carts.    - Isolation precautions followed, supplies available outside room, sign posted

## 2017-05-14 LAB
GLUCOSE BLD STRIP.AUTO-MCNC: 102 MG/DL (ref 70–110)
GLUCOSE BLD STRIP.AUTO-MCNC: 154 MG/DL (ref 70–110)
GLUCOSE BLD STRIP.AUTO-MCNC: 176 MG/DL (ref 70–110)
GLUCOSE BLD STRIP.AUTO-MCNC: 181 MG/DL (ref 70–110)

## 2017-05-14 PROCEDURE — 74011636637 HC RX REV CODE- 636/637: Performed by: INTERNAL MEDICINE

## 2017-05-14 PROCEDURE — 65310000000 HC RM PRIVATE REHAB

## 2017-05-14 PROCEDURE — 74011250637 HC RX REV CODE- 250/637: Performed by: INTERNAL MEDICINE

## 2017-05-14 PROCEDURE — 82962 GLUCOSE BLOOD TEST: CPT

## 2017-05-14 PROCEDURE — 74011250636 HC RX REV CODE- 250/636: Performed by: INTERNAL MEDICINE

## 2017-05-14 RX ADMIN — HYDROCODONE BITARTRATE AND ACETAMINOPHEN 1 TABLET: 5; 325 TABLET ORAL at 14:44

## 2017-05-14 RX ADMIN — Medication 400 MG: at 08:35

## 2017-05-14 RX ADMIN — GABAPENTIN 300 MG: 300 CAPSULE ORAL at 14:44

## 2017-05-14 RX ADMIN — INSULIN GLARGINE 65 UNITS: 100 INJECTION, SOLUTION SUBCUTANEOUS at 20:58

## 2017-05-14 RX ADMIN — INSULIN LISPRO 2 UNITS: 100 INJECTION, SOLUTION INTRAVENOUS; SUBCUTANEOUS at 12:38

## 2017-05-14 RX ADMIN — ROSUVASTATIN CALCIUM 5 MG: 5 TABLET ORAL at 20:59

## 2017-05-14 RX ADMIN — Medication 400 MG: at 17:45

## 2017-05-14 RX ADMIN — GABAPENTIN 300 MG: 300 CAPSULE ORAL at 21:01

## 2017-05-14 RX ADMIN — DIVALPROEX SODIUM 250 MG: 250 TABLET, DELAYED RELEASE ORAL at 20:59

## 2017-05-14 RX ADMIN — DOCUSATE SODIUM 100 MG: 100 CAPSULE, LIQUID FILLED ORAL at 08:36

## 2017-05-14 RX ADMIN — NEPHROCAP 1 CAPSULE: 1 CAP ORAL at 08:36

## 2017-05-14 RX ADMIN — LEVOTHYROXINE SODIUM 100 MCG: 100 TABLET ORAL at 06:33

## 2017-05-14 RX ADMIN — SERTRALINE HYDROCHLORIDE 50 MG: 50 TABLET ORAL at 08:35

## 2017-05-14 RX ADMIN — NAPROXEN 375 MG: 250 TABLET ORAL at 08:35

## 2017-05-14 RX ADMIN — EPLERENONE 25 MG: 25 TABLET, FILM COATED ORAL at 08:36

## 2017-05-14 RX ADMIN — ASPIRIN 81 MG CHEWABLE TABLET 81 MG: 81 TABLET CHEWABLE at 08:35

## 2017-05-14 RX ADMIN — FAMOTIDINE 20 MG: 20 TABLET ORAL at 08:36

## 2017-05-14 RX ADMIN — FAMOTIDINE 20 MG: 20 TABLET ORAL at 17:45

## 2017-05-14 RX ADMIN — INSULIN LISPRO 2 UNITS: 100 INJECTION, SOLUTION INTRAVENOUS; SUBCUTANEOUS at 17:45

## 2017-05-14 RX ADMIN — HEPARIN SODIUM 5000 UNITS: 5000 INJECTION, SOLUTION INTRAVENOUS; SUBCUTANEOUS at 21:06

## 2017-05-14 RX ADMIN — HEPARIN SODIUM 5000 UNITS: 5000 INJECTION, SOLUTION INTRAVENOUS; SUBCUTANEOUS at 06:03

## 2017-05-14 RX ADMIN — ARIPIPRAZOLE 10 MG: 10 TABLET ORAL at 08:35

## 2017-05-14 RX ADMIN — CHOLECALCIFEROL TAB 25 MCG (1000 UNIT) 1000 UNITS: 25 TAB at 08:36

## 2017-05-14 RX ADMIN — HEPARIN SODIUM 5000 UNITS: 5000 INJECTION, SOLUTION INTRAVENOUS; SUBCUTANEOUS at 14:44

## 2017-05-14 RX ADMIN — HYDROCODONE BITARTRATE AND ACETAMINOPHEN 1 TABLET: 5; 325 TABLET ORAL at 06:03

## 2017-05-14 RX ADMIN — NAPROXEN 375 MG: 250 TABLET ORAL at 17:45

## 2017-05-14 RX ADMIN — GABAPENTIN 300 MG: 300 CAPSULE ORAL at 06:03

## 2017-05-14 RX ADMIN — TRAZODONE HYDROCHLORIDE 100 MG: 50 TABLET ORAL at 20:59

## 2017-05-14 RX ADMIN — HYDROCODONE BITARTRATE AND ACETAMINOPHEN 1 TABLET: 5; 325 TABLET ORAL at 21:05

## 2017-05-14 RX ADMIN — DOCUSATE SODIUM 100 MG: 100 CAPSULE, LIQUID FILLED ORAL at 17:44

## 2017-05-14 NOTE — PROGRESS NOTES
conducted a Follow up consultation and Spiritual Assessment for Stephen Sinha, who is a 62 y.o.,female. The  provided the following Interventions:  Continued the relationship of care and support. Listened empathically. Offered prayer and assurance of continued prayer on patients behalf. Chart reviewed. The following outcomes were achieved:  Patient expressed gratitude for 's visit. Assessment:  There are no further spiritual or [employer] issues which require Spiritual Care Services interventions at this time. Plan:  Chaplains will continue to follow and will provide pastoral care on an as needed/requested basis.  recommends bedside caregivers page  on duty if patient shows signs of acute spiritual or emotional distress.        [address]   [phone]

## 2017-05-14 NOTE — REHAB NOTE
[facility]    Bedside and Verbal shift change report given to Jonas Velasquez LPN (oncoming nurse) by Salas Mukherjee, RN   (offgoing nurse). Report included the following information SBAR, Kardex, MAR and Recent Results.     Situation:   Code Status: Full Code   Reason for Admission: [address] Hospital Day: 4   Problem List:   Hospital Problems  Date Reviewed: 5/13/2017          Codes Class Noted POA    Cellulitis of right forearm ICD-10-CM: L03.113  ICD-9-CM: 682.3  5/4/2017 Yes        Olecranon bursitis of right elbow ICD-10-CM: M70.21  ICD-9-CM: 726.33  5/4/2017 Yes        Contusion of left elbow ICD-10-CM: S50.02XA  ICD-9-CM: 923.11  5/4/2017 Yes        Impaired mobility and ADLs ICD-10-CM: Z74.09  ICD-9-CM: 799.89  5/4/2017 Yes        * (Principal)SIRS (systemic inflammatory response syndrome) ([facility] ICD-10-CM: R65.10  ICD-9-CM: 995.90  5/2/2017 Yes        Right Achilles tendinitis ICD-10-CM: M76.61  ICD-9-CM: 726.71  5/2/2017 Yes        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 congestive heart failure (Chronic) ICD-10-CM: I13.10, N18.3  ICD-9-CM: 404.10, 585.3  Unknown Yes    Overview Signed 4/23/2013 10:02 AM by Vivian Rangel     Better controlled             Type 2 diabetes with stage 3 chronic kidney disease GFR 30-59 ([facility] (Chronic) ICD-10-CM: E11.22, N18.3  ICD-9-CM: 250.40, 585.3  Unknown Yes              Background:   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 congestive heart failure     Better controlled     Bipolar affective disorder ([facility] 12/5/2012    Cellulitis of right forearm 5/4/2017    Chronic back pain     Chronic obstructive pulmonary disease (COPD) ([facility]     SOB, on paula O2 Recent admission with psychosis     CKD stage G3a/A1, GFR 45-59 and albumin creatinine ratio <30 mg/g 5/11/2017    Urine microalbumin/Creatinine ratio (5/11/2017) = 9 mg/g    Contusion of left elbow 5/4/2017    Depression     Diabetic neuropathy associated with type 2 diabetes mellitus (HCC)     Diastolic dysfunction without heart failure     Stable on diuretics     Falls frequently     Gastroesophageal reflux disease with hiatal hernia     Generalized osteoarthritis of multiple sites     History of acute renal failure 5/31/2013    History of back injury     jumped out of second story window     History of hepatitis C     treated    History of penicillin allergy     Hypothyroidism     Hypoxemia requiring supplemental oxygen 12/29/2014    Intravenous drug user 5/2/2017    Memory difficulty     Mixed connective tissue disease ([address] 5/10/2017    Obesity, Class I, BMI 30-34.9     Olecranon bursitis of right elbow 5/4/2017    Recurrent genital herpes 5/31/2013    Right Achilles tendinitis 5/2/2017    Sarcoidosis ([facility]     Sickle cell trait ([facility]     Type 2 diabetes with stage 3 chronic kidney disease GFR 30-59 ([facility]     Venous insufficiency     Wears glasses       Patient taking anticoagulants yes    Patient has a defibrillator: no     Assessment:   Changes in Assessment throughout shift: no     Patient has central line: no Reasons if yes: Insertion date: Last dressing date:   Patient has Renae Cath: no Reasons if yes:     Insertion date:     Last Vitals:     Vitals:    05/13/17 0818 05/13/17 1600 05/13/17 2056 05/14/17 0756   BP: 95/62 131/79 111/73 123/62   Pulse: (!) 54 61 82 61   Resp: 17 17 18 17   Temp: 97.1 °F (36.2 °C) 97.1 °F (36.2 °C) 97.9 °F (36.6 °C) 97.8 °F (36.6 °C)   SpO2: 94% 97% 91% 94%   Weight:       Height:       LMP: 11/25/2012        PAIN    Pain Assessment    Pain Intensity 1: 6 (05/14/17 0742) Pain Intensity 1: 2 (12/29/14 1105)    Pain Location 1: Ankle, Arm, Elbow, Wrist Pain Location 1: Abdomen    Pain Intervention(s) 1: Medication (see MAR) Pain Intervention(s) 1: Medication (see MAR)  Patient Stated Pain Goal: 0 Patient Stated Pain Goal: 0  o Intervention effective: yes   o Other actions taken for pain: repositioning     Skin Assessment  Skin color Skin Color: Appropriate for ethnicity  Condition/Temperature Skin Condition/Temp: Dry, Warm  Integrity Skin Integrity: Wound (add Wound LDA)  Turgor Turgor: Non-tenting  Weekly Pressure Ulcer Documentation  Pressure  Injury Documentation: No Pressure Injury Noted-Pressure Ulcer Prevention Initiated  Wound Prevention & Protection Methods  Orientation of wound Orientation of Wound Prevention: Posterior  Location of Prevention Location of Wound Prevention: Sacrum/Coccyx  Dressing Present Dressing Present : No  Dressing Status    Wound Offloading Wound Offloading (Prevention Methods): Bed, pressure redistribution/air     INTAKE/OUPUT    Date 05/13/17 0700 - 05/14/17 0659 05/14/17 0700 - 05/15/17 0659   Shift 1732-0607 9721-6001 24 Hour Total 8988-4935 1023-6232 24 Hour Total   I  N  T  A  K  E   P.O. 440 180 620         P. O. 440 180 620       Shift Total  (mL/kg) 440  (4.6) 180  (1.9) 620  (6.5)      O  U  T  P  U  T   Urine  (mL/kg/hr)            Urine Occurrence(s) 3 x 3 x 6 x       Stool            Stool Occurrence(s) 0 x 0 x 0 x       Shift Total  (mL/kg)          180 620      Weight (kg) 95.3 95.3 95.3 95.3 95.3 95.3       Recommendations:  1. Patient needs and requests: none    2. Diet: diabetic    3. Pending tests/procedures: am labs     4. Functional Level/Equipment: assist x 2/wheelchair    5. Estimated Discharge Date: tbd Posted on Whiteboard in Patients Room: no     HEALS Safety Check    A safety check occurred in the patient's room between off going nurse and oncoming nurse listed above.     The safety check included the below items  Area Items   H  High Alert Medications - Verify all high alert medication drips (heparin, PCA, etc.)   E  Equipment - Suction is set up for ALL patients (with lashay)  - Red plugs utilized for all equipment (IV pumps, etc.)  - WOWs wiped down at end of shift.  - Room stocked with oxygen, suction, and other unit-specific supplies   A  Alarms - Bed alarm is set for fall risk patients  - Ensure chair alarm is in place and activated if patient is up in a chair   L  Lines - Check IV for any infiltration  - Renae bag is empty if patient has a Renae   - Tubing and IV bags are labeled   S  Safety   - Room is clean, patient is clean, and equipment is clean. - Hallways are clear from equipment besides carts. - Fall bracelet on for fall risk patients  - Ensure room is clear and free of clutter  - Suction is set up for ALL patients (with lashay)  - Hallways are clear from equipment besides carts.    - Isolation precautions followed, supplies available outside room, sign posted   Oumar Harmon RN

## 2017-05-14 NOTE — PROGRESS NOTES
[facility] PHYSICAL REHABILITATION  [address] [place] [address] [address]     INPATIENT REHABILITATION  DAILY PROGRESS NOTE     Date: 5/14/2017    Name: Alma Pedroza Age / Sex: 62 y.o. / female   CSN: [id number] MRN: [id number]   [facility] Date: 5/10/2017 Length of Stay: 4 days     Primary Rehab Diagnosis: Impaired Mobility and ADLs secondary to Systemic inflammatory response syndrome (SIRS) secondary to:  1. Cellulitis of the right forearm  2. Olecranon bursitis of the right elbow  3. Contusion of left elbow  4. Right Achilles tendonitis      Subjective:     No new issues or problems reported. Blood pressure controlled. Blood sugars controlled.       Objective:     Vital Signs:  Patient Vitals for the past 24 hrs:   BP Temp Pulse Resp SpO2   05/14/17 0756 123/62 97.8 °F (36.6 °C) 61 17 94 %   05/13/17 2056 111/73 97.9 °F (36.6 °C) 82 18 91 %   05/13/17 1600 131/79 97.1 °F (36.2 °C) 61 17 97 %        Current Medications:  Current Facility-Administered Medications   Medication Dose Route Frequency    magnesium oxide (MAG-OX) tablet 400 mg  400 mg Oral BID    naproxen (NAPROSYN) tablet 375 mg  375 mg Oral BID WITH MEALS    cholecalciferol (VITAMIN D3) tablet 1,000 Units  1,000 Units Oral DAILY    acetaminophen (TYLENOL) tablet 650 mg  650 mg Oral Q4H PRN    docusate sodium (COLACE) capsule 100 mg  100 mg Oral BID    bisacodyl (DULCOLAX) tablet 10 mg  10 mg Oral Q48H PRN    heparin (porcine) injection 5,000 Units  5,000 Units SubCUTAneous Q8H    insulin lispro (HUMALOG) injection   SubCUTAneous TIDAC    famotidine (PEPCID) tablet 20 mg  20 mg Oral BID    eplerenone (INSPRA) tablet 25 mg  25 mg Oral DAILY    insulin glargine (LANTUS) injection 65 Units  65 Units SubCUTAneous QHS    albuterol (PROVENTIL VENTOLIN) nebulizer solution 2.5 mg  2.5 mg Nebulization Q4H PRN    divalproex DR (DEPAKOTE) tablet 250 mg  250 mg Oral QHS    ARIPiprazole (ABILIFY) tablet 10 mg  10 mg Oral DAILY    gabapentin (NEURONTIN) capsule 300 mg  300 mg Oral Q8H    traZODone (DESYREL) tablet 100 mg  100 mg Oral QHS    rosuvastatin (CRESTOR) tablet 5 mg  5 mg Oral QHS    aspirin chewable tablet 81 mg  81 mg Oral DAILY WITH BREAKFAST    levothyroxine (SYNTHROID) tablet 100 mcg  100 mcg Oral ACB    sertraline (ZOLOFT) tablet 50 mg  50 mg Oral DAILY    HYDROcodone-acetaminophen (NORCO) 5-325 mg per tablet 1 Tab  1 Tab Oral Q4H PRN    umeclidinium-vilanterol (ANORO ELLIPTA) 62.5 mcg- 25 mcg/inhalation  1 Puff Inhalation QAM RT    B complex-vitaminC-folic acid (NEPHROCAP) cap  1 Cap Oral DAILY       Allergies: Allergies   Allergen Reactions    Moxifloxacin Rash    Pcn [Penicillins] Swelling    Sulfa (Sulfonamide Antibiotics) Swelling       Lab/Data Review:  Recent Results (from the past 24 hour(s))   GLUCOSE, POC    Collection Time: 05/13/17 12:04 PM   Result Value Ref Range    Glucose (POC) 197 (H) 70 - 110 mg/dL   GLUCOSE, POC    Collection Time: 05/13/17  4:54 PM   Result Value Ref Range    Glucose (POC) 103 70 - 110 mg/dL   GLUCOSE, POC    Collection Time: 05/13/17  9:19 PM   Result Value Ref Range    Glucose (POC) 182 (H) 70 - 110 mg/dL   GLUCOSE, POC    Collection Time: 05/14/17  8:06 AM   Result Value Ref Range    Glucose (POC) 102 70 - 110 mg/dL       Estimated Glomerular Filtration Rate:  On admission, based on a Creatinine of 1.26 mg/dl:   Estimated GFR using CKD-EPI = 53.6 mL/min/1.73m2   Estimated GFR using MDRD = 55.7 mL/min/1.73m2      Assessment:     Primary Rehabilitation Diagnosis  1. Impaired Mobility and ADLs  2. Systemic inflammatory response syndrome (SIRS) secondary to:   a. Cellulitis of the right forearm   b. Olecranon bursitis of the right elbow   c. Contusion of left elbow   d.  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 heart failure    Depression    History of back injury    Anxiety    Wears glasses    History of hepatitis C    Sickle cell trait (HCC)    History of acute renal failure    Hypothyroidism    Recurrent genital herpes    Sarcoidosis ([facility]    History pf abscess of right arm    Hypoxemia requiring supplemental oxygen    Abnormal nuclear stress test    History of cellulitis and abscess of hand    History of cellulitis and abscess of foot    Intravenous drug user    History of penicillin allergy    Falls frequently    Gastroesophageal reflux disease with hiatal hernia    Memory difficulty    Mixed connective tissue disease     CKD stage G3a/A1, GFR 45-59 and albumin creatinine ratio <30 mg/g        Plan:     1. Justification for continued stay: Good progression towards established rehabilitation goals. 2. Medical Issues being followed closely:    [x]  Fall and safety precautions     [x]  Wound Care     [x]  Bowel and Bladder Function     [x]  Fluid Electrolyte and Nutrition Balance     [x]  Pain Control      3. Issues that 24 hour rehabilitation nursing is following:    [x]  Fall and safety precautions     [x]  Wound Care     [x]  Bowel and Bladder Function     [x]  Fluid Electrolyte and Nutrition Balance     [x]  Pain Control      [x]  Assistance with and education on in-room safety with transfers to and from the bed, wheelchair, toilet and shower. 4. Acute rehabilitation plan of care:    [x]  Continue current care and rehab.            [x]  Physical Therapy           [x]  Occupational Therapy           []  Speech Therapy     []  Hold Rehab until further notice     5. Medications:    [x]  MAR Reviewed     [x]  Continue Present Medications     6. DVT Prophylaxis:      []  Lovenox     [x]  Unfractionated Heparin     []  Coumadin     []  NOAC     [x]  ROBERT Stockings     []  Sequential Compression Device     []  None     7.  Orders:   > Systemic inflammatory response syndrome (SIRS) secondary to Cellulitis of the right forearm, Olecranon bursitis of the right elbow, Contusion of left elbow and Right Achilles tendonitis    > Patient had completed the recommended treatment course of oral Clindamycin on 5/13/2017   > Continue Diclofenac 2 grams applied to affected areas 4 times daily     > Abnormally low HDL with hypertriglyceridemia   > Continue Rosuvastatin 5 mg PO q HS     > 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 congestive heart failure    > On 5/11/2017, discontinued Furosemide 20 mg PO once daily (re: rising Creatinine)     > Bipolar affective disease   > Continue:    > Aripiprazole 10 mg PO once daily    > Divalproex  mg PO q HS     > Chronic obstructive pulmonary disease   > Continue:    > Anoro Ellipta 1 puff inhaled once daily    > Albuterol nebulization q 4 hr PRN for shortness of breath     > Depression / Anxiety   > Continue:    > Aripiprazole 10 mg PO once daily    > Sertraline 50 mg PO once daily    > Trazodone 100 mg PO q HS     > Diabetic neuropathy   > Continue Gabapentin 300 mg PO q 8 hr     > Diastolic dysfunction without heart failure   > On 5/11/2017, discontinued Furosemide 20 mg PO once daily (re: rising Creatinine)   > Eplerenone 25 mg PO once daily      > Gastroesophageal reflux disease with hiatal hernia   > Continue Famotidine 20 mg PO BID     > Hypothyroidism   > Continue Levothyroxine 100 mcg PO once daily before breakfast     > Hypoxemia requiring supplemental oxygen   > Continue O2 inhalation 2 LPM via nasal cannula continuous     > Type 2 diabetes mellitus with diabetic neuropathy and stage 3 chronic kidney disease    > Prior to admission to [facility], the patient claimed she wa snot using Lantus at home; instead, she was using Insulin U500 on a sliding scale basis   > Continue:    > Lantus 65 units SC q HS    > Humalog insulin sliding scale SC TID AC only     > Mg (5/11/2017, on admission) = 1.6   > Patient was started on Mg(OH)2 400 mg PO BID   > Continue Mg(OH)2 400 mg PO BID    > CKD stage G3a/A1, GFR 45-59 and albumin creatinine ratio <30 mg/g     > On admission, based on a Creatinine of 1.26 mg/dl:    Estimated GFR using CKD-EPI = 53.6 mL/min/1.73m2    Estimated GFR using MDRD = 55.7 mL/min/1.73m2    > Urine microalbumin/Creatinine ratio (5/11/2017) = 9 mg/g    > Mixed connective tissue disease    > Anti-RNP (5/8/2017) = 4.3   > JOY (5/8/2017) = Negative   > Impression of Dr. Rochelle Esparza was early mixed connective tissue disease.    > Upon discharge from the ARU, patient is to follow-up with Rheumatology (Dr. Talia Greene) for a Plaquenil eye exam    > Analgesia   > On 5/12/2017:    > Discontinued Diclofenac 2 grams applied to affected areas 4 times daily     > Started Naproxen 375 mg PO BID with meals   > Continue:    > Acetaminophen 650 mg PO q 4 hr PRN for pain level less than 5/10    > Naproxen 375 mg PO BID with meals    > Norco 5/325 1 tab PO q 4 hr PRN for pain level greater than 4/10      Signed:    Ananya Swain MD    May 14, 2017

## 2017-05-15 PROBLEM — N17.9 ACUTE RENAL FAILURE SUPERIMPOSED ON STAGE 3 CHRONIC KIDNEY DISEASE (HCC): Status: ACTIVE | Noted: 2017-05-15

## 2017-05-15 PROBLEM — N18.30 ACUTE RENAL FAILURE SUPERIMPOSED ON STAGE 3 CHRONIC KIDNEY DISEASE (HCC): Status: ACTIVE | Noted: 2017-05-15

## 2017-05-15 LAB
ANION GAP BLD CALC-SCNC: 7 MMOL/L (ref 3–18)
BUN SERPL-MCNC: 22 MG/DL (ref 7–18)
BUN/CREAT SERPL: 12 (ref 12–20)
CALCIUM SERPL-MCNC: 10.4 MG/DL (ref 8.5–10.1)
CHLORIDE SERPL-SCNC: 100 MMOL/L (ref 100–108)
CO2 SERPL-SCNC: 32 MMOL/L (ref 21–32)
CREAT SERPL-MCNC: 1.87 MG/DL (ref 0.6–1.3)
GLUCOSE BLD STRIP.AUTO-MCNC: 139 MG/DL (ref 70–110)
GLUCOSE BLD STRIP.AUTO-MCNC: 152 MG/DL (ref 70–110)
GLUCOSE BLD STRIP.AUTO-MCNC: 167 MG/DL (ref 70–110)
GLUCOSE BLD STRIP.AUTO-MCNC: 170 MG/DL (ref 70–110)
GLUCOSE SERPL-MCNC: 136 MG/DL (ref 74–99)
HCT VFR BLD AUTO: 32.8 % (ref 35–45)
HGB BLD-MCNC: 10.5 G/DL (ref 12–16)
PLATELET # BLD AUTO: 264 K/UL (ref 135–420)
POTASSIUM SERPL-SCNC: 4.9 MMOL/L (ref 3.5–5.5)
SODIUM SERPL-SCNC: 139 MMOL/L (ref 136–145)

## 2017-05-15 PROCEDURE — 74011250636 HC RX REV CODE- 250/636: Performed by: INTERNAL MEDICINE

## 2017-05-15 PROCEDURE — 77030012935 HC DRSG AQUACEL BMS -B

## 2017-05-15 PROCEDURE — 80048 BASIC METABOLIC PNL TOTAL CA: CPT | Performed by: INTERNAL MEDICINE

## 2017-05-15 PROCEDURE — 36415 COLL VENOUS BLD VENIPUNCTURE: CPT | Performed by: INTERNAL MEDICINE

## 2017-05-15 PROCEDURE — 97116 GAIT TRAINING THERAPY: CPT

## 2017-05-15 PROCEDURE — 97530 THERAPEUTIC ACTIVITIES: CPT

## 2017-05-15 PROCEDURE — 97112 NEUROMUSCULAR REEDUCATION: CPT

## 2017-05-15 PROCEDURE — 65310000000 HC RM PRIVATE REHAB

## 2017-05-15 PROCEDURE — 97110 THERAPEUTIC EXERCISES: CPT

## 2017-05-15 PROCEDURE — 74011636637 HC RX REV CODE- 636/637: Performed by: INTERNAL MEDICINE

## 2017-05-15 PROCEDURE — 82962 GLUCOSE BLOOD TEST: CPT

## 2017-05-15 PROCEDURE — 74011250637 HC RX REV CODE- 250/637: Performed by: INTERNAL MEDICINE

## 2017-05-15 PROCEDURE — 97535 SELF CARE MNGMENT TRAINING: CPT

## 2017-05-15 PROCEDURE — 85049 AUTOMATED PLATELET COUNT: CPT | Performed by: INTERNAL MEDICINE

## 2017-05-15 PROCEDURE — 85018 HEMOGLOBIN: CPT | Performed by: INTERNAL MEDICINE

## 2017-05-15 PROCEDURE — 81001 URINALYSIS AUTO W/SCOPE: CPT | Performed by: INTERNAL MEDICINE

## 2017-05-15 RX ORDER — SODIUM CHLORIDE 9 MG/ML
1000 INJECTION, SOLUTION INTRAVENOUS ONCE
Status: COMPLETED | OUTPATIENT
Start: 2017-05-15 | End: 2017-05-15

## 2017-05-15 RX ORDER — NAPROXEN 250 MG/1
250 TABLET ORAL 2 TIMES DAILY WITH MEALS
Status: DISCONTINUED | OUTPATIENT
Start: 2017-05-15 | End: 2017-05-15

## 2017-05-15 RX ORDER — DIPHENHYDRAMINE HCL 25 MG
25 CAPSULE ORAL
Status: DISCONTINUED | OUTPATIENT
Start: 2017-05-15 | End: 2017-05-31 | Stop reason: HOSPADM

## 2017-05-15 RX ORDER — GABAPENTIN 300 MG/1
300 CAPSULE ORAL EVERY 12 HOURS
Status: DISCONTINUED | OUTPATIENT
Start: 2017-05-15 | End: 2017-05-31 | Stop reason: HOSPADM

## 2017-05-15 RX ORDER — FAMOTIDINE 20 MG/1
20 TABLET, FILM COATED ORAL DAILY
Status: DISCONTINUED | OUTPATIENT
Start: 2017-05-16 | End: 2017-05-31 | Stop reason: HOSPADM

## 2017-05-15 RX ADMIN — HYDROCODONE BITARTRATE AND ACETAMINOPHEN 1 TABLET: 5; 325 TABLET ORAL at 02:33

## 2017-05-15 RX ADMIN — NAPROXEN 375 MG: 250 TABLET ORAL at 09:51

## 2017-05-15 RX ADMIN — LEVOTHYROXINE SODIUM 100 MCG: 100 TABLET ORAL at 06:44

## 2017-05-15 RX ADMIN — FAMOTIDINE 20 MG: 20 TABLET ORAL at 09:52

## 2017-05-15 RX ADMIN — INSULIN LISPRO 2 UNITS: 100 INJECTION, SOLUTION INTRAVENOUS; SUBCUTANEOUS at 17:23

## 2017-05-15 RX ADMIN — HEPARIN SODIUM 5000 UNITS: 5000 INJECTION, SOLUTION INTRAVENOUS; SUBCUTANEOUS at 21:37

## 2017-05-15 RX ADMIN — GABAPENTIN 300 MG: 300 CAPSULE ORAL at 19:19

## 2017-05-15 RX ADMIN — INSULIN GLARGINE 65 UNITS: 100 INJECTION, SOLUTION SUBCUTANEOUS at 21:36

## 2017-05-15 RX ADMIN — CHOLECALCIFEROL TAB 25 MCG (1000 UNIT) 1000 UNITS: 25 TAB at 09:52

## 2017-05-15 RX ADMIN — ASPIRIN 81 MG CHEWABLE TABLET 81 MG: 81 TABLET CHEWABLE at 09:52

## 2017-05-15 RX ADMIN — ARIPIPRAZOLE 10 MG: 10 TABLET ORAL at 09:51

## 2017-05-15 RX ADMIN — DOCUSATE SODIUM 100 MG: 100 CAPSULE, LIQUID FILLED ORAL at 09:52

## 2017-05-15 RX ADMIN — DIPHENHYDRAMINE HYDROCHLORIDE 25 MG: 25 CAPSULE ORAL at 20:53

## 2017-05-15 RX ADMIN — HYDROCODONE BITARTRATE AND ACETAMINOPHEN 1 TABLET: 5; 325 TABLET ORAL at 12:43

## 2017-05-15 RX ADMIN — DIVALPROEX SODIUM 250 MG: 250 TABLET, DELAYED RELEASE ORAL at 20:53

## 2017-05-15 RX ADMIN — DOCUSATE SODIUM 100 MG: 100 CAPSULE, LIQUID FILLED ORAL at 19:19

## 2017-05-15 RX ADMIN — ROSUVASTATIN CALCIUM 5 MG: 5 TABLET ORAL at 20:53

## 2017-05-15 RX ADMIN — SODIUM CHLORIDE 1000 ML: 900 INJECTION, SOLUTION INTRAVENOUS at 20:47

## 2017-05-15 RX ADMIN — HEPARIN SODIUM 5000 UNITS: 5000 INJECTION, SOLUTION INTRAVENOUS; SUBCUTANEOUS at 13:12

## 2017-05-15 RX ADMIN — HYDROCODONE BITARTRATE AND ACETAMINOPHEN 1 TABLET: 5; 325 TABLET ORAL at 19:21

## 2017-05-15 RX ADMIN — SERTRALINE HYDROCHLORIDE 50 MG: 50 TABLET ORAL at 09:52

## 2017-05-15 RX ADMIN — Medication 400 MG: at 19:19

## 2017-05-15 RX ADMIN — INSULIN LISPRO 2 UNITS: 100 INJECTION, SOLUTION INTRAVENOUS; SUBCUTANEOUS at 12:56

## 2017-05-15 RX ADMIN — HEPARIN SODIUM 5000 UNITS: 5000 INJECTION, SOLUTION INTRAVENOUS; SUBCUTANEOUS at 06:04

## 2017-05-15 RX ADMIN — EPLERENONE 25 MG: 25 TABLET, FILM COATED ORAL at 09:52

## 2017-05-15 RX ADMIN — TRAZODONE HYDROCHLORIDE 100 MG: 50 TABLET ORAL at 20:53

## 2017-05-15 RX ADMIN — GABAPENTIN 300 MG: 300 CAPSULE ORAL at 06:04

## 2017-05-15 RX ADMIN — NEPHROCAP 1 CAPSULE: 1 CAP ORAL at 09:51

## 2017-05-15 RX ADMIN — Medication 400 MG: at 09:52

## 2017-05-15 NOTE — REHAB NOTE
[facility]    Bedside and Verbal shift change report given to Doreatha Osler (oncoming nurse) by Jorene Kanner, RN   (offgoing nurse). Report included the following information SBAR, Kardex, MAR and Recent Results.     Situation:   Code Status: Full Code   Reason for Admission: [facility] Day: 5   Problem List:   Hospital Problems  Date Reviewed: 5/14/2017          Codes Class Noted POA    Cellulitis of right forearm ICD-10-CM: L03.113  ICD-9-CM: 682.3  5/4/2017 Yes        Olecranon bursitis of right elbow ICD-10-CM: M70.21  ICD-9-CM: 726.33  5/4/2017 Yes        Contusion of left elbow ICD-10-CM: S50.02XA  ICD-9-CM: 923.11  5/4/2017 Yes        Impaired mobility and ADLs ICD-10-CM: Z74.09  ICD-9-CM: 799.89  5/4/2017 Yes        * (Principal)SIRS (systemic inflammatory response syndrome) ([facility] ICD-10-CM: R65.10  ICD-9-CM: 995.90  5/2/2017 Yes        Right Achilles tendinitis ICD-10-CM: M76.61  ICD-9-CM: 726.71  5/2/2017 Yes        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 congestive heart failure (Chronic) ICD-10-CM: I13.10, N18.3  ICD-9-CM: 404.10, 585.3  Unknown Yes    Overview Signed 4/23/2013 10:02 AM by Esvin Hopkins     Better controlled             Type 2 diabetes with stage 3 chronic kidney disease GFR 30-59 ([facility] (Chronic) ICD-10-CM: E11.22, N18.3  ICD-9-CM: 250.40, 585.3  Unknown Yes              Background:   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 congestive heart failure     Better controlled     Bipolar affective disorder ([facility] 12/5/2012    Cellulitis of right forearm 5/4/2017    Chronic back pain     Chronic obstructive pulmonary disease (COPD) ([facility]     SOB, on paula O2 Recent admission with psychosis     CKD stage G3a/A1, GFR 45-59 and albumin creatinine ratio <30 mg/g 5/11/2017    Urine microalbumin/Creatinine ratio (5/11/2017) = 9 mg/g    Contusion of left elbow 5/4/2017    Depression     Diabetic neuropathy associated with type 2 diabetes mellitus (HCC)     Diastolic dysfunction without heart failure     Stable on diuretics     Falls frequently     Gastroesophageal reflux disease with hiatal hernia     Generalized osteoarthritis of multiple sites     History of acute renal failure 5/31/2013    History of back injury     jumped out of second story window     History of hepatitis C     treated    History of penicillin allergy     Hypothyroidism     Hypoxemia requiring supplemental oxygen 12/29/2014    Intravenous drug user 5/2/2017    Memory difficulty     Mixed connective tissue disease ([address] 5/10/2017    Obesity, Class I, BMI 30-34.9     Olecranon bursitis of right elbow 5/4/2017    Recurrent genital herpes 5/31/2013    Right Achilles tendinitis 5/2/2017    Sarcoidosis ([facility]     Sickle cell trait ([facility]     Type 2 diabetes with stage 3 chronic kidney disease GFR 30-59 ([facility]     Venous insufficiency     Wears glasses       Patient taking anticoagulants yes    Patient has a defibrillator: no     Assessment:   Changes in Assessment throughout shift: no     Patient has central line: no Reasons if yes: Insertion date: Last dressing date:   Patient has Renae Cath: no Reasons if yes:     Insertion date:     Last Vitals:     Vitals:    05/13/17 2056 05/14/17 0756 05/14/17 1553 05/14/17 2051   BP: 111/73 123/62 94/62 118/75   Pulse: 82 61 70 70   Resp: 18 17 16 18   Temp: 97.9 °F (36.6 °C) 97.8 °F (36.6 °C) 97.3 °F (36.3 °C) 98.4 °F (36.9 °C)   SpO2: 91% 94% 98% 100%   Weight:       Height:       LMP: 11/25/2012        PAIN    Pain Assessment    Pain Intensity 1: 0 (05/15/17 0744) Pain Intensity 1: 2 (12/29/14 1105)    Pain Location 1: Ankle, Foot, Hand Pain Location 1: Abdomen    Pain Intervention(s) 1: Medication (see MAR) Pain Intervention(s) 1: Medication (see MAR)  Patient Stated Pain Goal: 0 Patient Stated Pain Goal: 0  o Intervention effective: yes   o Other actions taken for pain: repositioning     Skin Assessment  Skin color Skin Color: Appropriate for ethnicity  Condition/Temperature Skin Condition/Temp: Dry, Warm  Integrity Skin Integrity: Wound (add Wound LDA)  Turgor Turgor: Non-tenting  Weekly Pressure Ulcer Documentation  Pressure  Injury Documentation: No Pressure Injury Noted-Pressure Ulcer Prevention Initiated  Wound Prevention & Protection Methods  Orientation of wound Orientation of Wound Prevention: Posterior  Location of Prevention Location of Wound Prevention: Sacrum/Coccyx  Dressing Present Dressing Present : No  Dressing Status    Wound Offloading Wound Offloading (Prevention Methods): Bed, pressure redistribution/air     INTAKE/OUPUT    Date 05/14/17 0700 - 05/15/17 0659 05/15/17 0700 - 05/16/17 0659   Shift 7638-5006 2662-1745 24 Hour Total 7127-5608 1833-6011 24 Hour Total   I  N  T  A  K  E   P.O. 620  620         P. O. 620  620       Shift Total  (mL/kg) 620  (6.5)  620  (6.5)      O  U  T  P  U  T   Urine  (mL/kg/hr)            Urine Occurrence(s) 2 x 3 x 5 x       Stool            Stool Occurrence(s) 1 x 0 x 1 x       Shift Total  (mL/kg)           620      Weight (kg) 95.3 95.3 95.3 95.3 95.3 95.3       Recommendations:  1. Patient needs and requests: none    2. Diet: diabetic    3. Pending tests/procedures: am labs     4. Functional Level/Equipment: assist x 2/wheelchair    5. Estimated Discharge Date: tbd Posted on Whiteboard in Patients Room: no     HEALS Safety Check    A safety check occurred in the patient's room between off going nurse and oncoming nurse listed above.     The safety check included the below items  Area Items   H  High Alert Medications - Verify all high alert medication drips (heparin, PCA, etc.)   E  Equipment - Suction is set up for ALL patients (with lashay)  - Red plugs utilized for all equipment (IV pumps, etc.)  - WOWs wiped down at end of shift.  - Room stocked with oxygen, suction, and other unit-specific supplies   A  Alarms - Bed alarm is set for fall risk patients  - Ensure chair alarm is in place and activated if patient is up in a chair   L  Lines - Check IV for any infiltration  - Renae bag is empty if patient has a Renae   - Tubing and IV bags are labeled   S  Safety   - Room is clean, patient is clean, and equipment is clean. - Hallways are clear from equipment besides carts. - Fall bracelet on for fall risk patients  - Ensure room is clear and free of clutter  - Suction is set up for ALL patients (with lashay)  - Hallways are clear from equipment besides carts.    - Isolation precautions followed, supplies available outside room, sign posted   Vera Cook RN

## 2017-05-15 NOTE — PROGRESS NOTES
[x] Psychology  [] Social Work [] Recreational Therapy    INTERVENTION  UNITS/TIME OF SERVICE   Assessment May 15, 2017   Supportive Counseling    Orientation    Discharge Planning    Resource Linkage              Progress/Current Status    Patient seen for Psychological Evaluation as requested on admission to ARU by  [address] [place] [address] [place]. Patient observed sitting upright in wheelchair and with continuous O2 in place. Patient is able to describe the circumstances of her hospitalization and indicates that she is motivated to return to her baseline, indicating: \"I was mobile. \"  She acknowledges history of mood disturbance and is followed by [facility] in [place] and Rx psychotropic medications, now including: Depakote, Abilify, Zoloft and Trazodone. While not reporting current, acute feelings of distress, she will be monitored for any emergent emotional difficulties while on ARU and encouraged to persevere. Parenthetically, while therapy staff is describing evidence of some anxiety interfering in her effort, she herself is denying feeling anxious. Some effort will be made to help her identify same and especially to help her to enhance her therapy effort.         Emerita Hurt, THE [facility] 5/15/2017 12:29 PM

## 2017-05-15 NOTE — PROGRESS NOTES
Problem: Mobility Impaired (Adult and Pediatric)  Goal: *Acute Goals and Plan of Care (Insert Text)  Physical Therapy Goals  STGs  Initiated 2017 and to be accomplished within 7 day(s) [17]  1. Patient will roll side to side in bed with moderate assistance. 2. Patient will perform supine to sit and sit to supine with moderate assistance. 3. Patient will transfer from bed to chair and chair to bed with moderate assistance using the least restrictive device. 4. Patient will perform sit to stand with moderate assistance . 5. Patient will propel w/c on level surface for 50 ft with R UE and B LEs with min A. LTGs  Initiated 2017 and to be accomplished within 4 weeks [17]  1. Patient will roll side to side in bed with contact guard assist.   2. Patient will perform supine to sit and sit to supine in bed with contact guard assistance. 3. Patient will transfer from bed to chair and chair to bed with contact guard assistance using the least restrictive device. 4. Patient will perform sit to stand with contact guard assist.  5. Patient will ambulate with minimal assistance/contact guard assist for 50 feet with the least restrictive device. 5. Patient will ascend/descend 3 stairs with bilateral handrail(s) with moderate assistance. PHYSICAL THERAPY DAILY NOTE  Patient Name:Stephen Sinha  Time In: 1030  Time Out: 1200  Patient Seen For: Balance activities;Gait training;Patient education; Therapeutic exercise;Transfer training  Diagnosis: Right forearm cellulits  SIRS (systemic inflammatory response syndrome) (HCC) SIRS (systemic inflammatory response syndrome) (HCC)  Precautions: falls, O2 via nc at 2L     Subjective: pt stated \"I'm doing okay. \"  \"I'm progressing. \" \"I'm standing up better than last week. \"     Pain at start of tx: 10  Pain at stop of tx: 8/10     Patient identified with name and : yes         Objective: FIMS/MMT  Pt seen for treatment session to include sit/stand training from w/c and EOM, transfers training without AD with an anterior approach and PT support and also with RW support, balance training at [facility] (reaching out of SERA and across mid-line for cones, kicking at ball with alternating B LE's) and also standing at RW, gait training with RW, supine B LE therex, pt education, and w/c mobility training. Pt c/o pain in B hands and also R ankle, but limited c/o's during session. BED/MAT MOBILITY Daily Assessment     Rolling Right : 3 (Moderate assistance ) (on table mat w/out siderails)  Rolling Left : 3 (Moderate assistance )  Supine to Sit : 4 (Minimal assistance) (on mat table w/out sidrails)  Sit to Supine : 4 (Minimal assistance)      Pt required CGA/min A for LE mgm't during supine/sit and min A at trunk to help with lift-off during supine/sit on mat table without siderails. Decreased use of UE's during supine/sit due to pain. Pt able to obtain a long-sit position on mat table with min A at trunk. Pt c/o some pain in hips and behind knees with LE's extended straight on table due to tightness in hip flexors and hamstrings. TRANSFERS Daily Assessment     Transfer Type: SPT with walker  Other: stand step with RW  Transfer Assistance : 4 (Minimal assistance)  Sit to Stand Assistance: Minimal assistance (from w/c and mat table,x 7 reps, mod A from w/c as fatigues)  Car Transfers: Not tested  Car Type: n/a      Pt initially performed stand step transfer from w/c to mat table with PT support without AD using an anterior approach and gait belt. Pt required min A for standing and also to weight shift during stepping. Pt then participated in sit/stand training from mat table with and without RW, requiring min A initially and then CGA with repetition. Pt demonstrated stand step transfer with RW with CGA/min A back to w/c. Pt needed mod A to stand from w/c toward end of session as fatigued.            GAIT Daily Assessment     Amount of Assistance: 4 (Minimal assistance)  Distance (ft): 40 Feet (ft)  Assistive Device: Walker, rolling;Gait belt (w/c follow, O2 donned)      Decreased step clearance, decreased step length, decreased pace, w/c follow for safety. BALANCE Daily Assessment     Sitting - Static: Good (unsupported)  Sitting - Dynamic: Fair (occasional) (+)  Standing - Static: Fair (with RW support)  Standing - Dynamic : Impaired      Pt demonstrated better erect posture in standing today and stood at RW x 5 trials for 15-45 secs in static balance with CGA. Pt participated in sitting balance training while reaching out of SERA and across mid-line to reach for cones with SBA, as well as kicking at ball rolled on floor with alternating feet to work on wt shifting, decreased support, and LE coordination. WHEELCHAIR MOBILITY Daily Assessment     Able to Propel (ft): 100 feet (50 ft x 2 reps with 2 turns )  Functional Level: 2  Curbs/Ramps Assist Required (FIM Score): 1 (Total assistance)  Wheelchair Setup Assist Required : 3 (Moderate assistance)  Wheelchair Management: Manages left brake;Manages right brake (with brake extension)          LOWER EXTREMITY EXERCISES Daily Assessment     Extremity: Both  Exercise Type #1: Supine lower extremity strengthening (AAROM: SLR, hip abd, AP's, SAQ's)  Sets Performed: 2  Reps Performed: 10  Level of Assist: Minimal assistance (and additional time, with rest breaks)              Assessment: pt continues to show good progress with improvements noted in all functional areas. Pt able to initiate gait training today with RW, as well as stand step transfers with RW. Pt demonstrated better erect posture in standing today also. Pt has met all STG's set at eval.               Plan of Care: continue per current POC/goals with focus on strengthening, balance, and gait to work toward a min A level consistently for increased safety and decreased burden of care.       Evgeny Montiel, PT  5/15/2017

## 2017-05-15 NOTE — PROGRESS NOTES
[facility] PHYSICAL REHABILITATION  [address] [place] [address], [address]     INPATIENT REHABILITATION  DAILY PROGRESS NOTE     Date: 5/15/2017    Name: Ju Gonzalez Age / Sex: 62 y.o. / female   CSN: [id number] MRN: [id number]   [facility] Date: 5/10/2017 Length of Stay: 5 days     Primary Rehab Diagnosis: Impaired Mobility and ADLs secondary to Systemic inflammatory response syndrome (SIRS) secondary to:  1. Cellulitis of the right forearm  2. Olecranon bursitis of the right elbow  3. Contusion of left elbow  4. Right Achilles tendonitis      Subjective:     Patient seen and examined. Blood pressure controlled. Blood sugars controlled. Patient's Complaint:   Has problem sleeping  Urinary problems (urinary urgency)    Pain Control: stable, mild-to-moderate joint symptoms intermittently, reasonably well controlled by current meds      Objective:     Vital Signs:  Patient Vitals for the past 24 hrs:   BP Temp Pulse Resp SpO2   05/15/17 0802 132/73 97.7 °F (36.5 °C) (!) 59 18 97 %   05/14/17 2051 118/75 98.4 °F (36.9 °C) 70 18 100 %   05/14/17 1553 94/62 97.3 °F (36.3 °C) 70 16 98 %        Physical Examination:  GENERAL SURVEY: Patient is awake, alert, oriented x 3, sitting comfortably on the chair, not in acute respiratory distress.   HEENT: pink palpebral conjunctivae, anicteric sclerae, no nasoaural discharge, moist oral mucosa  NECK: supple, no jugular venous distention, no palpable lymph nodes  CHEST/LUNGS: symmetrical chest expansion, good air entry, clear breath sounds  HEART: adynamic precordium, good S1 S2, no S3, regular rhythm, no murmurs  ABDOMEN: obese, bowel sounds appreciated, soft, non-tender  EXTREMITIES: (+) left hand wrapped in gauze, pink nailbeds, (+) warmth/erythema/swelling of both elbows, (+) bipedal edema, full and equal pulses, no calf tenderness   NEUROLOGICAL EXAM: The patient is awake, alert and oriented x3, able to answer questions fairly appropriately, able to follow 1 and 2 step commands. Able to tell time from the wall clock. Cranial nerves II-XII are grossly intact. No gross sensory deficit. Motor strength is 2+/5 on both shoulders, 3/5 on the right elbow, 3-/5 on the left elbow, 2+/5 on both wrists, 3+/5 on the right handgrip, 3/5 on the left handgrip, 3-/5 on the right hip, 3/5 on the left hip, 3-/5 on both knees and both ankles.       Current Medications:  Current Facility-Administered Medications   Medication Dose Route Frequency    [START ON 5/16/2017] famotidine (PEPCID) tablet 20 mg  20 mg Oral DAILY    magnesium oxide (MAG-OX) tablet 400 mg  400 mg Oral BID    naproxen (NAPROSYN) tablet 375 mg  375 mg Oral BID WITH MEALS    cholecalciferol (VITAMIN D3) tablet 1,000 Units  1,000 Units Oral DAILY    acetaminophen (TYLENOL) tablet 650 mg  650 mg Oral Q4H PRN    docusate sodium (COLACE) capsule 100 mg  100 mg Oral BID    bisacodyl (DULCOLAX) tablet 10 mg  10 mg Oral Q48H PRN    heparin (porcine) injection 5,000 Units  5,000 Units SubCUTAneous Q8H    insulin lispro (HUMALOG) injection   SubCUTAneous TIDAC    eplerenone (INSPRA) tablet 25 mg  25 mg Oral DAILY    insulin glargine (LANTUS) injection 65 Units  65 Units SubCUTAneous QHS    albuterol (PROVENTIL VENTOLIN) nebulizer solution 2.5 mg  2.5 mg Nebulization Q4H PRN    divalproex DR (DEPAKOTE) tablet 250 mg  250 mg Oral QHS    ARIPiprazole (ABILIFY) tablet 10 mg  10 mg Oral DAILY    gabapentin (NEURONTIN) capsule 300 mg  300 mg Oral Q8H    traZODone (DESYREL) tablet 100 mg  100 mg Oral QHS    rosuvastatin (CRESTOR) tablet 5 mg  5 mg Oral QHS    aspirin chewable tablet 81 mg  81 mg Oral DAILY WITH BREAKFAST    levothyroxine (SYNTHROID) tablet 100 mcg  100 mcg Oral ACB    sertraline (ZOLOFT) tablet 50 mg  50 mg Oral DAILY    HYDROcodone-acetaminophen (NORCO) 5-325 mg per tablet 1 Tab  1 Tab Oral Q4H PRN    umeclidinium-vilanterol (ANORO ELLIPTA) 62.5 mcg- 25 mcg/inhalation  1 Puff Inhalation QAM RT    B complex-vitaminC-folic acid (NEPHROCAP) cap  1 Cap Oral DAILY       Allergies: Allergies   Allergen Reactions    Moxifloxacin Rash    Pcn [Penicillins] Swelling    Sulfa (Sulfonamide Antibiotics) Swelling       Lab/Data Review:  Recent Results (from the past 24 hour(s))   GLUCOSE, POC    Collection Time: 05/14/17 12:34 PM   Result Value Ref Range    Glucose (POC) 154 (H) 70 - 110 mg/dL   GLUCOSE, POC    Collection Time: 05/14/17  4:59 PM   Result Value Ref Range    Glucose (POC) 176 (H) 70 - 110 mg/dL   GLUCOSE, POC    Collection Time: 05/14/17  8:36 PM   Result Value Ref Range    Glucose (POC) 181 (H) 70 - 433 mg/dL   METABOLIC PANEL, BASIC    Collection Time: 05/15/17  6:19 AM   Result Value Ref Range    Sodium 139 136 - 145 mmol/L    Potassium 4.9 3.5 - 5.5 mmol/L    Chloride 100 100 - 108 mmol/L    CO2 32 21 - 32 mmol/L    Anion gap 7 3.0 - 18 mmol/L    Glucose 136 (H) 74 - 99 mg/dL    BUN 22 (H) 7.0 - 18 MG/DL    Creatinine 1.87 (H) 0.6 - 1.3 MG/DL    BUN/Creatinine ratio 12 12 - 20      GFR est AA 33 (L) >60 ml/min/1.73m2    GFR est non-AA 27 (L) >60 ml/min/1.73m2    Calcium 10.4 (H) 8.5 - 10.1 MG/DL   HGB & HCT    Collection Time: 05/15/17  6:19 AM   Result Value Ref Range    HGB 10.5 (L) 12.0 - 16.0 g/dL    HCT 32.8 (L) 35.0 - 45.0 %   PLATELET    Collection Time: 05/15/17  6:19 AM   Result Value Ref Range    PLATELET 280 182 - 152 K/uL   GLUCOSE, POC    Collection Time: 05/15/17  8:01 AM   Result Value Ref Range    Glucose (POC) 139 (H) 70 - 110 mg/dL       Estimated Glomerular Filtration Rate:  CKD-EPI:   On admission, estimated GFR was 53.6 mL/min/1.73m2 based on a Creatinine of 1.26 mg/dl. Most recent estimated GFR was 33.3 mL/min/1.73m2 based on a Creatinine of 1.87 mg/dl. MDRD:   On admission, estimated GFR was 55.7 mL/min/1.73m2 based on a Creatinine of 1.26 mg/dl. Most recent estimated GFR was 35.3 mL/min/1.73m2 based on a Creatinine of 1.87 mg/dl.       Assessment:     Primary Rehabilitation Diagnosis  1. Impaired Mobility and ADLs  2. Systemic inflammatory response syndrome (SIRS) secondary to:   a. Cellulitis of the right forearm   b. Olecranon bursitis of the right elbow   c. Contusion of left elbow   d. 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 heart failure    Depression    History of back injury    Anxiety    Wears glasses    History of hepatitis C    Sickle cell trait (HCC)    History of acute renal failure    Hypothyroidism    Recurrent genital herpes    Sarcoidosis ([facility] [address]    History pf abscess of right arm    Hypoxemia requiring supplemental oxygen    Abnormal nuclear stress test    History of cellulitis and abscess of hand    History of cellulitis and abscess of foot    Intravenous drug user    History of penicillin allergy    Falls frequently    Gastroesophageal reflux disease with hiatal hernia    Memory difficulty    Mixed connective tissue disease     CKD stage G3a/A1, GFR 45-59 and albumin creatinine ratio <30 mg/g     Acute renal failure superimposed on stage 3 chronic kidney disease       Plan:     1. Justification for continued stay: Good progression towards established rehabilitation goals.     2. Medical Issues being followed closely:    [x]  Fall and safety precautions     [x]  Wound Care     [x]  Bowel and Bladder Function     [x]  Fluid Electrolyte and Nutrition Balance [x]  Pain Control      3. Issues that 24 hour rehabilitation nursing is following:    [x]  Fall and safety precautions     [x]  Wound Care     [x]  Bowel and Bladder Function     [x]  Fluid Electrolyte and Nutrition Balance     [x]  Pain Control      [x]  Assistance with and education on in-room safety with transfers to and from the bed, wheelchair, toilet and shower. 4. Acute rehabilitation plan of care:    [x]  Continue current care and rehab. [x]  Physical Therapy           [x]  Occupational Therapy           []  Speech Therapy     []  Hold Rehab until further notice     5. Medications:    [x]  MAR Reviewed     [x]  Continue Present Medications     6. DVT Prophylaxis:      []  Lovenox     [x]  Unfractionated Heparin     []  Coumadin     []  NOAC     [x]  ROBERT Stockings     []  Sequential Compression Device     []  None     7.  Orders:   > Systemic inflammatory response syndrome (SIRS) secondary to Cellulitis of the right forearm, Olecranon bursitis of the right elbow, Contusion of left elbow and Right Achilles tendonitis    > Patient had completed the recommended treatment course of oral Clindamycin on 5/13/2017     > Abnormally low HDL with hypertriglyceridemia   > Continue Rosuvastatin 5 mg PO q HS     > 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 congestive heart failure    > On 5/11/2017, discontinued Furosemide 20 mg PO once daily (re: rising Creatinine)     > Bipolar affective disease   > Continue:    > Aripiprazole 10 mg PO once daily    > Divalproex  mg PO q HS     > Chronic obstructive pulmonary disease   > Continue:    > Anoro Ellipta 1 puff inhaled once daily    > Albuterol nebulization q 4 hr PRN for shortness of breath     > Depression / Anxiety   > Continue:    > Aripiprazole 10 mg PO once daily    > Sertraline 50 mg PO once daily    > Trazodone 100 mg PO q HS     > Diabetic neuropathy   > Decrease Gabapentin from 300 mg PO q 8 hr to 300 mg PO q 12 hr (re: decrease in CrCl)     > Diastolic dysfunction without heart failure   > On 5/11/2017, discontinued Furosemide 20 mg PO once daily (re: rising Creatinine)   > On 5/14/2017, resumed Eplerenone 25 mg PO once daily    > Discontinue Eplerenone 25 mg PO once daily (re: decrease in CrCl)     > Gastroesophageal reflux disease with hiatal hernia   > Decrease Famotidine from 20 mg PO BID to 20 mg PO once daily     > Hypothyroidism   > Continue Levothyroxine 100 mcg PO once daily before breakfast     > Hypoxemia requiring supplemental oxygen   > Continue O2 inhalation 2 LPM via nasal cannula continuous     > Type 2 diabetes mellitus with diabetic neuropathy and stage 3 chronic kidney disease    > Prior to admission to [facility], the patient claimed she wa snot using Lantus at home; instead, she was using Insulin U500 on a sliding scale basis   > Continue:    > Lantus 65 units SC q HS    > Humalog insulin sliding scale SC TID AC only     > Mg (5/11/2017, on admission) = 1.6   > Patient was started on Mg(OH)2 400 mg PO BID   > Continue Mg(OH)2 400 mg PO BID    > CKD stage G3a/A1, GFR 45-59 and albumin creatinine ratio <30 mg/g     > On admission, based on a Creatinine of 1.26 mg/dl:    Estimated GFR using CKD-EPI = 53.6 mL/min/1.73m2    Estimated GFR using MDRD = 55.7 mL/min/1.73m2    > Urine microalbumin/Creatinine ratio (5/11/2017) = 9 mg/g    > Acute renal failure superimposed on stage 3 chronic kidney disease   > BUN/Creatinine (5/10/2017) = 19/1.33    > BUN/Creatinine (5/11/2017, on admission) = 16/1.26    > On 5/12/2017:    > Discontinued Diclofenac 2 grams applied to affected areas 4 times daily     > Started Naproxen 375 mg PO BID with meals   > On 5/14/2017, resumed Eplerenone 25 mg PO once daily    > BUN/Creatinine (5/15/2017) = 22/1.87    > Urinalysis   > Discontinue Naproxen 375 mg PO BID with meals   > Discontinue Eplerenone 25 mg PO once daily    > Decrease Gabapentin from 300 mg PO q 8 hr to 300 mg PO q 12 hr    > Decrease Famotidine from 20 mg PO BID to 20 mg PO once daily   > Increase oral fluid intake   > Start IVF: 0.9%  ml/hr x 1 liter    > Difficulty sleeping    > Continue Trazodone 100 mg PO q HS   > Add Diphenhydramine HCl 25 mg PO q HS    > Urinary urgency   > Diphenhydramine HCl 25 mg PO q HS (re: ADR is urinary retention)    > Analgesia   > On 5/12/2017:    > Discontinued Diclofenac 2 grams applied to affected areas 4 times daily     > Started Naproxen 375 mg PO BID with meals   > Discontinue Naproxen 375 mg PO BID with meals (re: decrease in CrCl)   > Continue:    > Acetaminophen 650 mg PO q 4 hr PRN for pain level less than 5/10    > Norco 5/325 1 tab PO q 4 hr PRN for pain level greater than 4/10      8. Patient's progress in rehabilitation and medical issues discussed with the patient. All questions answered to the best of my ability. Care plan discussed with patient and nurse.       Signed:    Yovanny Aranda MD    May 15, 2017

## 2017-05-15 NOTE — PROGRESS NOTES
Patient Name: Hasmukh Molina  Patient Age: 62 y.o.   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 congestive heart failure     Better controlled     Bipolar affective disorder ([address] 12/5/2012    Cellulitis of right forearm 5/4/2017    Chronic back pain     Chronic obstructive pulmonary disease (COPD) (HCC)     SOB, on paula O2 Recent admission with psychosis     CKD stage G3a/A1, GFR 45-59 and albumin creatinine ratio <30 mg/g 5/11/2017    Urine microalbumin/Creatinine ratio (5/11/2017) = 9 mg/g    Contusion of left elbow 5/4/2017    Depression     Diabetic neuropathy associated with type 2 diabetes mellitus (HCC)     Diastolic dysfunction without heart failure     Stable on diuretics     Falls frequently     Gastroesophageal reflux disease with hiatal hernia     Generalized osteoarthritis of multiple sites     History of acute renal failure 5/31/2013    History of back injury     jumped out of second story window     History of hepatitis C     treated    History of penicillin allergy     Hypothyroidism     Hypoxemia requiring supplemental oxygen 12/29/2014    Intravenous drug user 5/2/2017    Memory difficulty     Mixed connective tissue disease ([address] 5/10/2017    Obesity, Class I, BMI 30-34.9     Olecranon bursitis of right elbow 5/4/2017    Recurrent genital herpes 5/31/2013    Right Achilles tendinitis 5/2/2017    Sarcoidosis ([address]     Sickle cell trait ([address]     Type 2 diabetes with stage 3 chronic kidney disease GFR 30-59 ([address]     Venous insufficiency     Wears glasses        Medical Diagnosis:  Right forearm cellulits  SIRS (systemic inflammatory response syndrome) (HCC) SIRS [facility]systemic inflammatory response syndrome) [facility])           Recreation Therapy Initial Assessment    Living arrangements:  _x_ alone   __spouse   __relative/family   __roommate    __other  Pt states she has personal care and children that live locally    Transportation: __drivers license   __public transportation   _x_friends/family   __none    Cognitive status:   Oriented to:   _x_person   _x__place   ___time   _x__situation  Responsiveness:  __x_alert   ___inconsistent   ___unresponsive    Mood: Pt was pleasant and cooperative during interview. Decision-making abilities: _x_demonstrates initiative   __with prompting   __dependent    Attention span: _x_good (+15 min.)   __fair(5-15min.)   __poor(5min. or less)  Follows directions: _x_good   ___fair   __poor prompts needed? __yes   __no    Communication:   x__verbalizes needs   ___uses gestures   __unable to verbalize  __verbal - difficult to understand __augmentative device    Ability to understand conversation: _x_good   __fair   __poor    Vision: _x_good   __fair   __poor   __blind   _x_wears glasses    Hearing:  x__good   __fair   __poor   __deaf   __hearing aid   __sign    Hears best in :___R ear   ___L ear    Premorbid mobility needs: _x_Independent   __assistance   __cane   __walker   __w/c    POSSIBLE LEISURE BARRIERS:         Cognitive Skills  Social Skills/Approp. Communication     Paralysis  Financial x General Weakness    x ROM Limitations R UE x Mobility x Endurance     Perceptual Problems  Grasp/Release  Fears/Phobias     Hearing Deficits  Visual Acuity  Motivation     Spasticity x Pain  Self Confidence     Attitude         Patient Goals: Pt was seen in her room for recreation therapy initial interview. Pt shared that she would attend [employer] when a family member was available to pick her up. She stated that she would go on community outings with family members.  Pt reported that while at home she would spend time watching TV or sitting on her front porch socializing with her neighbors.        Bill Nj, CTRS  5/15/2017

## 2017-05-16 LAB
ANION GAP BLD CALC-SCNC: 6 MMOL/L (ref 3–18)
APPEARANCE UR: CLEAR
BACTERIA URNS QL MICRO: ABNORMAL /HPF
BILIRUB UR QL: NEGATIVE
BUN SERPL-MCNC: 22 MG/DL (ref 7–18)
BUN/CREAT SERPL: 13 (ref 12–20)
CALCIUM SERPL-MCNC: 10.2 MG/DL (ref 8.5–10.1)
CHLORIDE SERPL-SCNC: 102 MMOL/L (ref 100–108)
CO2 SERPL-SCNC: 32 MMOL/L (ref 21–32)
COLOR UR: YELLOW
CREAT SERPL-MCNC: 1.72 MG/DL (ref 0.6–1.3)
EPITH CASTS URNS QL MICRO: ABNORMAL /LPF (ref 0–5)
GLUCOSE BLD STRIP.AUTO-MCNC: 117 MG/DL (ref 70–110)
GLUCOSE BLD STRIP.AUTO-MCNC: 161 MG/DL (ref 70–110)
GLUCOSE BLD STRIP.AUTO-MCNC: 165 MG/DL (ref 70–110)
GLUCOSE BLD STRIP.AUTO-MCNC: 93 MG/DL (ref 70–110)
GLUCOSE SERPL-MCNC: 85 MG/DL (ref 74–99)
GLUCOSE UR STRIP.AUTO-MCNC: NEGATIVE MG/DL
HGB UR QL STRIP: NEGATIVE
KETONES UR QL STRIP.AUTO: NEGATIVE MG/DL
LEUKOCYTE ESTERASE UR QL STRIP.AUTO: NEGATIVE
NITRITE UR QL STRIP.AUTO: NEGATIVE
PH UR STRIP: 7 [PH] (ref 5–8)
POTASSIUM SERPL-SCNC: 5 MMOL/L (ref 3.5–5.5)
PROT UR STRIP-MCNC: NEGATIVE MG/DL
RBC #/AREA URNS HPF: NEGATIVE /HPF (ref 0–5)
SODIUM SERPL-SCNC: 140 MMOL/L (ref 136–145)
SP GR UR REFRACTOMETRY: 1.01 (ref 1–1.03)
UROBILINOGEN UR QL STRIP.AUTO: 1 EU/DL (ref 0.2–1)
WBC URNS QL MICRO: ABNORMAL /HPF (ref 0–5)

## 2017-05-16 PROCEDURE — 74011250637 HC RX REV CODE- 250/637: Performed by: INTERNAL MEDICINE

## 2017-05-16 PROCEDURE — 80048 BASIC METABOLIC PNL TOTAL CA: CPT | Performed by: INTERNAL MEDICINE

## 2017-05-16 PROCEDURE — 74011636637 HC RX REV CODE- 636/637: Performed by: INTERNAL MEDICINE

## 2017-05-16 PROCEDURE — 65310000000 HC RM PRIVATE REHAB

## 2017-05-16 PROCEDURE — 97110 THERAPEUTIC EXERCISES: CPT

## 2017-05-16 PROCEDURE — 97530 THERAPEUTIC ACTIVITIES: CPT

## 2017-05-16 PROCEDURE — 97116 GAIT TRAINING THERAPY: CPT

## 2017-05-16 PROCEDURE — 36415 COLL VENOUS BLD VENIPUNCTURE: CPT | Performed by: INTERNAL MEDICINE

## 2017-05-16 PROCEDURE — 74011250636 HC RX REV CODE- 250/636: Performed by: INTERNAL MEDICINE

## 2017-05-16 PROCEDURE — 82962 GLUCOSE BLOOD TEST: CPT

## 2017-05-16 RX ORDER — SODIUM CHLORIDE 9 MG/ML
1000 INJECTION, SOLUTION INTRAVENOUS ONCE
Status: COMPLETED | OUTPATIENT
Start: 2017-05-16 | End: 2017-05-16

## 2017-05-16 RX ADMIN — NEPHROCAP 1 CAPSULE: 1 CAP ORAL at 08:42

## 2017-05-16 RX ADMIN — DIVALPROEX SODIUM 250 MG: 250 TABLET, DELAYED RELEASE ORAL at 22:08

## 2017-05-16 RX ADMIN — HYDROCODONE BITARTRATE AND ACETAMINOPHEN 1 TABLET: 5; 325 TABLET ORAL at 16:31

## 2017-05-16 RX ADMIN — SERTRALINE HYDROCHLORIDE 50 MG: 50 TABLET ORAL at 08:41

## 2017-05-16 RX ADMIN — FAMOTIDINE 20 MG: 20 TABLET ORAL at 08:41

## 2017-05-16 RX ADMIN — HYDROCODONE BITARTRATE AND ACETAMINOPHEN 1 TABLET: 5; 325 TABLET ORAL at 10:38

## 2017-05-16 RX ADMIN — TRAZODONE HYDROCHLORIDE 100 MG: 50 TABLET ORAL at 22:07

## 2017-05-16 RX ADMIN — HEPARIN SODIUM 5000 UNITS: 5000 INJECTION, SOLUTION INTRAVENOUS; SUBCUTANEOUS at 13:44

## 2017-05-16 RX ADMIN — LEVOTHYROXINE SODIUM 100 MCG: 100 TABLET ORAL at 06:46

## 2017-05-16 RX ADMIN — HYDROCODONE BITARTRATE AND ACETAMINOPHEN 1 TABLET: 5; 325 TABLET ORAL at 20:39

## 2017-05-16 RX ADMIN — ARIPIPRAZOLE 10 MG: 10 TABLET ORAL at 08:41

## 2017-05-16 RX ADMIN — INSULIN LISPRO 2 UNITS: 100 INJECTION, SOLUTION INTRAVENOUS; SUBCUTANEOUS at 18:03

## 2017-05-16 RX ADMIN — Medication 400 MG: at 08:41

## 2017-05-16 RX ADMIN — HYDROCODONE BITARTRATE AND ACETAMINOPHEN 1 TABLET: 5; 325 TABLET ORAL at 00:15

## 2017-05-16 RX ADMIN — HEPARIN SODIUM 5000 UNITS: 5000 INJECTION, SOLUTION INTRAVENOUS; SUBCUTANEOUS at 06:01

## 2017-05-16 RX ADMIN — ASPIRIN 81 MG CHEWABLE TABLET 81 MG: 81 TABLET CHEWABLE at 08:41

## 2017-05-16 RX ADMIN — Medication 400 MG: at 18:00

## 2017-05-16 RX ADMIN — INSULIN GLARGINE 65 UNITS: 100 INJECTION, SOLUTION SUBCUTANEOUS at 22:05

## 2017-05-16 RX ADMIN — DIPHENHYDRAMINE HYDROCHLORIDE 25 MG: 25 CAPSULE ORAL at 22:08

## 2017-05-16 RX ADMIN — ROSUVASTATIN CALCIUM 5 MG: 5 TABLET ORAL at 22:08

## 2017-05-16 RX ADMIN — CHOLECALCIFEROL TAB 25 MCG (1000 UNIT) 1000 UNITS: 25 TAB at 08:41

## 2017-05-16 RX ADMIN — HYDROCODONE BITARTRATE AND ACETAMINOPHEN 1 TABLET: 5; 325 TABLET ORAL at 06:05

## 2017-05-16 RX ADMIN — DOCUSATE SODIUM 100 MG: 100 CAPSULE, LIQUID FILLED ORAL at 08:42

## 2017-05-16 RX ADMIN — SODIUM CHLORIDE 1000 ML: 900 INJECTION, SOLUTION INTRAVENOUS at 18:04

## 2017-05-16 RX ADMIN — DOCUSATE SODIUM 100 MG: 100 CAPSULE, LIQUID FILLED ORAL at 18:00

## 2017-05-16 RX ADMIN — HEPARIN SODIUM 5000 UNITS: 5000 INJECTION, SOLUTION INTRAVENOUS; SUBCUTANEOUS at 22:06

## 2017-05-16 RX ADMIN — GABAPENTIN 300 MG: 300 CAPSULE ORAL at 18:00

## 2017-05-16 RX ADMIN — GABAPENTIN 300 MG: 300 CAPSULE ORAL at 06:01

## 2017-05-16 NOTE — PROGRESS NOTES
Problem: Mobility Impaired (Adult and Pediatric)  Goal: *Acute Goals and Plan of Care (Insert Text)  Physical Therapy Goals  STGs  Initiated 5/11/2017 and to be accomplished within 7 day(s) [5/18/17]  1. Patient will roll side to side in bed with moderate assistance. 2. Patient will perform supine to sit and sit to supine with moderate assistance. 3. Patient will transfer from bed to chair and chair to bed with moderate assistance using the least restrictive device. 4. Patient will perform sit to stand with moderate assistance . 5. Patient will propel w/c on level surface for 50 ft with R UE and B LEs with min A. LTGs  Initiated 5/11/2017 and to be accomplished within 4 weeks [6/8/17]  1. Patient will roll side to side in bed with contact guard assist.   2. Patient will perform supine to sit and sit to supine in bed with contact guard assistance. 3. Patient will transfer from bed to chair and chair to bed with contact guard assistance using the least restrictive device. 4. Patient will perform sit to stand with contact guard assist.  5. Patient will ambulate with minimal assistance/contact guard assist for 50 feet with the least restrictive device. 5. Patient will ascend/descend 3 stairs with bilateral handrail(s) with moderate assistance. PHYSICAL THERAPY DAILY NOTE  Patient Name:Stephen Sinha  Time In: 5552  Time Out: 1200  Patient seen for functional mobility training and assessment. Diagnosis: Right forearm cellulits  SIRS (systemic inflammatory response syndrome) (HCC) SIRS (systemic inflammatory response syndrome) (HCC)  Precautions: Fall Risk Precautions, 2LPM O2 via nasal cannula     Subjective: Pt appears lethargic at times and requires max encouragement for full effort throughout treatment.   Pt initiates conversations at times by directing therapist to do the activity for the pt such as initiating hygiene management with toileting task requiring education re: purpose of therapy and to attempt tasks that may be challenging with assistance and feedback from therapy staff to promote improved function and independence. Pain at start of tx: 8/10 \"joints\" - B ankles/B knees  Pain at stop of tx: 8/10 \"joints\" - B ankles/B knees     Patient identified with name and : yes         Objective:       Pt requires frequent and increased rest breaks this session with pt's SpO2 at 89% on supplemental O2 at start of session. Pt recovers to 95-96% with pursed lip breathing and rest but does de-saturate to 90% with activity. TRANSFERS Daily Assessment     Other: Stand Step with RW  Transfer Assistance : 3 (Moderate assistance )  Sit to Stand Assistance: Moderate assistance   Pt requires mod assist initially from w/c height (21\") for ant and up weight shift but is able to perform 2 sets of 5 repetitions of sit <> stand from high-low mat table at 22\" height with minimal assistance for ant and up weight shift and min assist for slow controlled descent with stand to sit. Once standing, pt performed stand step transfer with RW with minimal assistance for balance in standing and max cues for decreased fwd flexed posture with RW management. GAIT Daily Assessment     Pt ambulated 82 feet with RW with CGA to minimal assistance for balance with pt requiring mod verbal cues for decreased fwd flexed posture with pt demonstrating decreased B step length and foot clearance. BALANCE Daily Assessment     Sitting - Static: Good (unsupported)  Sitting - Dynamic: Good (unsupported)  Standing - Static: Poor  Standing - Dynamic : Impaired          WHEELCHAIR MOBILITY Daily Assessment     At end of treatment session, pt required max encouragement to participate in w/c propulsion to return to room. Assessment: Pt continues to demonstrate variable performance 2/2 pain and fear of falling and requires education re: purpose of skilled intervention.              Plan of Care: Continue with current POC. Emphasize importance of participation in challenging mobility tasks to promote increased independence with functional mobility.      Nadine Sheikh, PT, DPT  5/16/2017

## 2017-05-16 NOTE — REHAB NOTE
[facility]  Bedside and Verbal shift change report given to Fabrizio Johnson, RN (oncoming nurse) by Gage Bellamy RN   (offgoing nurse). Report included the following information SBAR, Kardex, MAR and Recent Results.     Situation:   Code Status: Full Code   Reason for Admission: [facility] Day: 6   Problem List:   Hospital Problems  Date Reviewed: 5/15/2017          Codes Class Noted POA    Acute renal failure superimposed on stage 3 chronic kidney disease ([facility] ICD-10-CM: N17.9, N18.3  ICD-9-CM: 584.9, 585.3  5/15/2017 No        Cellulitis of right forearm ICD-10-CM: L03.113  ICD-9-CM: 682.3  5/4/2017 Yes        Olecranon bursitis of right elbow ICD-10-CM: M70.21  ICD-9-CM: 726.33  5/4/2017 Yes        Contusion of left elbow ICD-10-CM: S50.02XA  ICD-9-CM: 923.11  5/4/2017 Yes        Impaired mobility and ADLs ICD-10-CM: Z74.09  ICD-9-CM: 799.89  5/4/2017 Yes        * (Principal)SIRS (systemic inflammatory response syndrome) ([facility] ICD-10-CM: R65.10  ICD-9-CM: 995.90  5/2/2017 Yes        Right Achilles tendinitis ICD-10-CM: M76.61  ICD-9-CM: 726.71  5/2/2017 Yes        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 congestive heart failure (Chronic) ICD-10-CM: I13.10, N18.3  ICD-9-CM: 404.10, 585.3  Unknown Yes    Overview Signed 4/23/2013 10:02 AM by Santos Turner     Better controlled             Type 2 diabetes with stage 3 chronic kidney disease GFR 30-59 ([facility] (Chronic) ICD-10-CM: E11.22, N18.3  ICD-9-CM: 250.40, 585.3  Unknown Yes              Background:   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 congestive heart failure     Better controlled     Bipolar affective disorder ([facility] 12/5/2012    Cellulitis of right forearm 5/4/2017    Chronic back pain     Chronic obstructive pulmonary disease (COPD) (HCC)     SOB, on paula O2 Recent admission with psychosis     CKD stage G3a/A1, GFR 45-59 and albumin creatinine ratio <30 mg/g 5/11/2017    Urine microalbumin/Creatinine ratio (5/11/2017) = 9 mg/g    Contusion of left elbow 5/4/2017    Depression     Diabetic neuropathy associated with type 2 diabetes mellitus (HCC)     Diastolic dysfunction without heart failure     Stable on diuretics     Falls frequently     Gastroesophageal reflux disease with hiatal hernia     Generalized osteoarthritis of multiple sites     History of acute renal failure 5/31/2013    History of back injury     jumped out of second story window     History of hepatitis C     treated    History of penicillin allergy     Hypothyroidism     Hypoxemia requiring supplemental oxygen 12/29/2014    Intravenous drug user 5/2/2017    Memory difficulty     Mixed connective tissue disease ([facility] 5/10/2017    Obesity, Class I, BMI 30-34.9     Olecranon bursitis of right elbow 5/4/2017    Recurrent genital herpes 5/31/2013    Right Achilles tendinitis 5/2/2017    Sarcoidosis ([facility]     Sickle cell trait ([facility]     Type 2 diabetes with stage 3 chronic kidney disease GFR 30-59 ([facility]     Venous insufficiency     Wears glasses       Patient taking anticoagulants yes    Patient has a defibrillator: no     Assessment:   Changes in Assessment throughout shift: no     Patient has central line: no Reasons if yes: Insertion date: Last dressing date:   Patient has Renae Cath: no Reasons if yes:     Insertion date:     Last Vitals:     Vitals:    05/15/17 0802 05/15/17 1610 05/15/17 2129 05/16/17 0711   BP: 132/73 115/67 132/76 123/73   Pulse: (!) 59 68 66 61   Resp: 18 20 18 18   Temp: 97.7 °F (36.5 °C) 98.2 °F (36.8 °C) 96.5 °F (35.8 °C) 97.3 °F (36.3 °C)   SpO2: 97% 98% 100% 97%   Weight:       Height: LMP: 11/25/2012        PAIN    Pain Assessment    Pain Intensity 1: 7 (05/16/17 0636) Pain Intensity 1: 2 (12/29/14 1105)    Pain Location 1: Leg Pain Location 1: Abdomen    Pain Intervention(s) 1: Medication (see MAR) Pain Intervention(s) 1: Medication (see MAR)  Patient Stated Pain Goal: 0 Patient Stated Pain Goal: 0  o Intervention effective: yes   o Other actions taken for pain: repositioning     Skin Assessment  Skin color Skin Color: Appropriate for ethnicity  Condition/Temperature Skin Condition/Temp: Dry, Warm  Integrity Skin Integrity: Wound (add Wound LDA)  Turgor Turgor: Non-tenting  Weekly Pressure Ulcer Documentation  Pressure  Injury Documentation: No Pressure Injury Noted-Pressure Ulcer Prevention Initiated  Wound Prevention & Protection Methods  Orientation of wound Orientation of Wound Prevention: Posterior  Location of Prevention Location of Wound Prevention: Sacrum/Coccyx  Dressing Present Dressing Present : No  Dressing Status    Wound Offloading Wound Offloading (Prevention Methods): Bed, pressure redistribution/air     INTAKE/OUPUT    Date 05/15/17 0700 - 05/16/17 0659 05/16/17 0700 - 05/17/17 0659   Shift 4800-9049 0523-4153 24 Hour Total 3355-2009 1039-3304 24 Hour Total   I  N  T  A  K  E   P.O. 620  620         P. O. 620  620       Shift Total  (mL/kg) 620  (6.5)  620  (6.5)      O  U  T  P  U  T   Urine  (mL/kg/hr)            Urine Occurrence(s) 4 x 4 x 8 x       Stool            Stool Occurrence(s) 0 x 0 x 0 x       Shift Total  (mL/kg)           620      Weight (kg) 95.3 95.3 95.3 95.3 95.3 95.3       Recommendations:  1. Patient needs and requests: none    2. Diet: diabetic    3. Pending tests/procedures: am labs     4. Functional Level/Equipment: assist x 2/wheelchair    5.  Estimated Discharge Date: tbd Posted on Whiteboard in Patients Room: no     HEALS Safety Check    A safety check occurred in the patient's room between off going nurse and oncoming nurse listed above.    The safety check included the below items  Area Items   H  High Alert Medications - Verify all high alert medication drips (heparin, PCA, etc.)   E  Equipment - Suction is set up for ALL patients (with lashay)  - Red plugs utilized for all equipment (IV pumps, etc.)  - WOWs wiped down at end of shift.  - Room stocked with oxygen, suction, and other unit-specific supplies   A  Alarms - Bed alarm is set for fall risk patients  - Ensure chair alarm is in place and activated if patient is up in a chair   L  Lines - Check IV for any infiltration  - Renae bag is empty if patient has a Renae   - Tubing and IV bags are labeled   S  Safety   - Room is clean, patient is clean, and equipment is clean. - Hallways are clear from equipment besides carts. - Fall bracelet on for fall risk patients  - Ensure room is clear and free of clutter  - Suction is set up for ALL patients (with lashay)  - Hallways are clear from equipment besides carts.    - Isolation precautions followed, supplies available outside room, sign posted   Nissa Broussard RN

## 2017-05-16 NOTE — PROGRESS NOTES
[facility] PHYSICAL REHABILITATION  [address] [place] [address], [address]     INPATIENT REHABILITATION  DAILY PROGRESS NOTE     Date: 5/16/2017    Name: Hasmukh Molina Age / Sex: 62 y.o. / female   CSN: [id number] MRN: [id number]   [facility] Date: 5/10/2017 Length of Stay: 6 days     Primary Rehab Diagnosis: Impaired Mobility and ADLs secondary to Systemic inflammatory response syndrome (SIRS) secondary to:  1. Cellulitis of the right forearm  2. Olecranon bursitis of the right elbow  3. Contusion of left elbow  4. Right Achilles tendonitis      Subjective:     Patient seen and examined. Blood pressure controlled. Blood sugars controlled. Patient's Complaint:   No significant medical complaints     Pain Control: stable, mild-to-moderate joint symptoms intermittently, reasonably well controlled by current meds      Objective:     Vital Signs:  Patient Vitals for the past 24 hrs:   BP Temp Pulse Resp SpO2   05/16/17 0711 123/73 97.3 °F (36.3 °C) 61 18 97 %   05/15/17 2129 132/76 96.5 °F (35.8 °C) 66 18 100 %   05/15/17 1610 115/67 98.2 °F (36.8 °C) 68 20 98 %        Physical Examination:  GENERAL SURVEY: Patient is awake, alert, oriented x 3, sitting comfortably on the chair, not in acute respiratory distress. HEENT: pink palpebral conjunctivae, anicteric sclerae, no nasoaural discharge, moist oral mucosa  NECK: supple, no jugular venous distention, no palpable lymph nodes  CHEST/LUNGS: symmetrical chest expansion, good air entry, clear breath sounds  HEART: adynamic precordium, good S1 S2, no S3, regular rhythm, no murmurs  ABDOMEN: obese, bowel sounds appreciated, soft, non-tender  EXTREMITIES: (+) left hand wrapped in gauze, pink nailbeds, (+) warmth/erythema/swelling of both elbows, (+) bipedal edema, full and equal pulses, no calf tenderness   NEUROLOGICAL EXAM: The patient is awake, alert and oriented x3, able to answer questions fairly appropriately, able to follow 1 and 2 step commands.  Able to tell time from the wall clock. Cranial nerves II-XII are grossly intact. No gross sensory deficit. Motor strength is 2+/5 on both shoulders, 3/5 on the right elbow, 3-/5 on the left elbow, 2+/5 on both wrists, 3+/5 on the right handgrip, 3/5 on the left handgrip, 3-/5 on the right hip, 3/5 on the left hip, 3-/5 on both knees and both ankles. Current Medications:  Current Facility-Administered Medications   Medication Dose Route Frequency    famotidine (PEPCID) tablet 20 mg  20 mg Oral DAILY    gabapentin (NEURONTIN) capsule 300 mg  300 mg Oral Q12H    diphenhydrAMINE (BENADRYL) capsule 25 mg  25 mg Oral QHS    magnesium oxide (MAG-OX) tablet 400 mg  400 mg Oral BID    cholecalciferol (VITAMIN D3) tablet 1,000 Units  1,000 Units Oral DAILY    acetaminophen (TYLENOL) tablet 650 mg  650 mg Oral Q4H PRN    docusate sodium (COLACE) capsule 100 mg  100 mg Oral BID    bisacodyl (DULCOLAX) tablet 10 mg  10 mg Oral Q48H PRN    heparin (porcine) injection 5,000 Units  5,000 Units SubCUTAneous Q8H    insulin lispro (HUMALOG) injection   SubCUTAneous TIDAC    insulin glargine (LANTUS) injection 65 Units  65 Units SubCUTAneous QHS    albuterol (PROVENTIL VENTOLIN) nebulizer solution 2.5 mg  2.5 mg Nebulization Q4H PRN    divalproex DR (DEPAKOTE) tablet 250 mg  250 mg Oral QHS    ARIPiprazole (ABILIFY) tablet 10 mg  10 mg Oral DAILY    traZODone (DESYREL) tablet 100 mg  100 mg Oral QHS    rosuvastatin (CRESTOR) tablet 5 mg  5 mg Oral QHS    aspirin chewable tablet 81 mg  81 mg Oral DAILY WITH BREAKFAST    levothyroxine (SYNTHROID) tablet 100 mcg  100 mcg Oral ACB    sertraline (ZOLOFT) tablet 50 mg  50 mg Oral DAILY    HYDROcodone-acetaminophen (NORCO) 5-325 mg per tablet 1 Tab  1 Tab Oral Q4H PRN    umeclidinium-vilanterol (ANORO ELLIPTA) 62.5 mcg- 25 mcg/inhalation  1 Puff Inhalation QAM RT    B complex-vitaminC-folic acid (NEPHROCAP) cap  1 Cap Oral DAILY       Allergies:   Allergies   Allergen Reactions    Moxifloxacin Rash    Pcn [Penicillins] Swelling    Sulfa (Sulfonamide Antibiotics) Swelling       Functional Progress:    OCCUPATIONAL THERAPY    ON ADMISSION MOST RECENT   Eating  Functional Level: 2   Eating  Functional Level: 2     Grooming  Functional Level: 1   Grooming  Functional Level: 1     Bathing  Functional Level: 1   Bathing  Functional Level: 1     Upper Body Dressing  Functional Level: 1   Upper Body Dressing  Functional Level: 1     Lower Body Dressing  Functional Level: 1   Lower Body Dressing  Functional Level: 1     Toileting  Functional Level: 2   Toileting  Functional Level: 1     Toilet Transfers  Toilet Transfer Score: 0   Toilet Transfers  Toilet Transfer Score: 1     Tub /Shower Transfers  Tub/Shower Transfer Score: 0   Tub/Shower Transfers  Tub/Shower Transfer Score: 0       Legend:   7 - Independent   6 - Modified Independent   5 - Standby Assistance / Supervision / Set-up   4 - Minimum Assistance / Contact Guard Assistance   3 - Moderate Assistance   2 - Maximum Assistance   1 - Total Assistance / Dependent       Lab/Data Review:  Recent Results (from the past 24 hour(s))   GLUCOSE, POC    Collection Time: 05/15/17 12:38 PM   Result Value Ref Range    Glucose (POC) 152 (H) 70 - 110 mg/dL   GLUCOSE, POC    Collection Time: 05/15/17  5:25 PM   Result Value Ref Range    Glucose (POC) 167 (H) 70 - 110 mg/dL   URINALYSIS W/MICROSCOPIC    Collection Time: 05/15/17  7:05 PM   Result Value Ref Range    Color YELLOW      Appearance CLEAR      Specific gravity 1.014 1.005 - 1.030      pH (UA) 7.0 5.0 - 8.0      Protein NEGATIVE  NEG mg/dL    Glucose NEGATIVE  NEG mg/dL    Ketone NEGATIVE  NEG mg/dL    Bilirubin NEGATIVE  NEG      Blood NEGATIVE  NEG      Urobilinogen 1.0 0.2 - 1.0 EU/dL    Nitrites NEGATIVE  NEG      Leukocyte Esterase NEGATIVE  NEG      WBC 1 to 4 0 - 5 /hpf    RBC NEGATIVE  0 - 5 /hpf    Epithelial cells 2+ 0 - 5 /lpf    Bacteria FEW (A) NEG /hpf   GLUCOSE, POC Collection Time: 05/15/17  9:27 PM   Result Value Ref Range    Glucose (POC) 170 (H) 70 - 412 mg/dL   METABOLIC PANEL, BASIC    Collection Time: 05/16/17  6:16 AM   Result Value Ref Range    Sodium 140 136 - 145 mmol/L    Potassium 5.0 3.5 - 5.5 mmol/L    Chloride 102 100 - 108 mmol/L    CO2 32 21 - 32 mmol/L    Anion gap 6 3.0 - 18 mmol/L    Glucose 85 74 - 99 mg/dL    BUN 22 (H) 7.0 - 18 MG/DL    Creatinine 1.72 (H) 0.6 - 1.3 MG/DL    BUN/Creatinine ratio 13 12 - 20      GFR est AA 37 (L) >60 ml/min/1.73m2    GFR est non-AA 30 (L) >60 ml/min/1.73m2    Calcium 10.2 (H) 8.5 - 10.1 MG/DL   GLUCOSE, POC    Collection Time: 05/16/17  7:11 AM   Result Value Ref Range    Glucose (POC) 93 70 - 110 mg/dL       Estimated Glomerular Filtration Rate:  CKD-EPI:   On admission, estimated GFR was 53.6 mL/min/1.73m2 based on a Creatinine of 1.26 mg/dl. Most recent estimated GFR was 33.3 mL/min/1.73m2 based on a Creatinine of 1.87 mg/dl. MDRD:   On admission, estimated GFR was 55.7 mL/min/1.73m2 based on a Creatinine of 1.26 mg/dl. Most recent estimated GFR was 35.3 mL/min/1.73m2 based on a Creatinine of 1.87 mg/dl. Assessment:     Primary Rehabilitation Diagnosis  1. Impaired Mobility and ADLs  2. Systemic inflammatory response syndrome (SIRS) secondary to:   a. Cellulitis of the right forearm   b. Olecranon bursitis of the right elbow   c. Contusion of left elbow   d.  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 heart failure    Depression    History of back injury    Anxiety    Wears glasses    History of hepatitis C    Sick[facility] trait ([facility]    History of acute renal failure    Hypothyroidism    Recurrent genital herpes    Sarcoidosis ([facility]    History pf abscess of right arm    Hypoxemia requiring supplemental oxygen    Abnormal nuclear stress test    History of cellulitis and abscess of hand    History of cellulitis and abscess of foot    Intravenous drug user    History of penicillin allergy    Falls frequently    Gastroesophageal reflux disease with hiatal hernia    Memory difficulty    Mixed connective tissue disease     CKD stage G3a/A1, GFR 45-59 and albumin creatinine ratio <30 mg/g     Acute renal failure superimposed on stage 3 chronic kidney disease       Plan:     1. Justification for continued stay: Good progression towards established rehabilitation goals. 2. Medical Issues being followed closely:    [x]  Fall and safety precautions     [x]  Wound Care     [x]  Bowel and Bladder Function     [x]  Fluid Electrolyte and Nutrition Balance     [x]  Pain Control      3. Issues that 24 hour rehabilitation nursing is following:    [x]  Fall and safety precautions     [x]  Wound Care     [x]  Bowel and Bladder Function     [x]  Fluid Electrolyte and Nutrition Balance     [x]  Pain Control      [x]  Assistance with and education on in-room safety with transfers to and from the bed, wheelchair, toilet and shower. 4. Acute rehabilitation plan of care:    [x]  Continue current care and rehab. [x]  Physical Therapy           [x]  Occupational Therapy           []  Speech Therapy     []  Hold Rehab until further notice     5. Medications:    [x]  MAR Reviewed     [x]  Continue Present Medications     6.  DVT Prophylaxis:      []  Lovenox     [x]  Unfractionated Heparin     []  Coumadin     []  NOAC     [x]  ROBERT Stockings     []  Sequential Compression Device     []  None     7.  Orders:   > Systemic inflammatory response syndrome (SIRS) secondary to Cellulitis of the right forearm, Olecranon bursitis of the right elbow, Contusion of left elbow and Right Achilles tendonitis    > Patient had completed the recommended treatment course of oral Clindamycin on 5/13/2017     > Abnormally low HDL with hypertriglyceridemia   > Continue Rosuvastatin 5 mg PO q HS     > 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 congestive heart failure    > On 5/11/2017, discontinued Furosemide 20 mg PO once daily (re: rising Creatinine)     > Bipolar affective disease   > Continue:    > Aripiprazole 10 mg PO once daily    > Divalproex  mg PO q HS     > Chronic obstructive pulmonary disease   > Continue:    > Anoro Ellipta 1 puff inhaled once daily    > Albuterol nebulization q 4 hr PRN for shortness of breath     > Depression / Anxiety   > Continue:    > Aripiprazole 10 mg PO once daily    > Sertraline 50 mg PO once daily    > Trazodone 100 mg PO q HS     > Diabetic neuropathy   > On 5/15/2017, decreased Gabapentin from 300 mg PO q 8 hr to 300 mg PO q 12 hr (re: decrease in CrCl)   > Continue Gabapentin 300 mg PO q 12 hr     > Diastolic dysfunction without heart failure   > On 5/11/2017, discontinued Furosemide 20 mg PO once daily (re: rising Creatinine)   > On 5/14/2017, resumed Eplerenone 25 mg PO once daily    > On 5/15/2017, discontinued Eplerenone 25 mg PO once daily (re: decrease in CrCl)     > Gastroesophageal reflux disease with hiatal hernia   > On 5/15/2017, decreased Famotidine from 20 mg PO BID to 20 mg PO once daily   > Continue Famotidine 20 mg PO once daily     > Hypothyroidism   > Continue Levothyroxine 100 mcg PO once daily before breakfast     > Hypoxemia requiring supplemental oxygen   > Continue O2 inhalation 2 LPM via nasal cannula continuous     > Type 2 diabetes mellitus with diabetic neuropathy and stage 3 chronic kidney disease    > Prior to admission to [facility], the patient claimed she wa snot using Lantus at home; instead, she was using Insulin U500 on a sliding scale basis   > Continue:    > Lantus 65 units SC q HS    > Humalog insulin sliding scale SC TID AC only     > Mg (5/11/2017, on admission) = 1.6   > Patient was started on Mg(OH)2 400 mg PO BID   > Continue Mg(OH)2 400 mg PO BID    > CKD stage G3a/A1, GFR 45-59 and albumin creatinine ratio <30 mg/g     > On admission, based on a Creatinine of 1.26 mg/dl:    Estimated GFR using CKD-EPI = 53.6 mL/min/1.73m2    Estimated GFR using MDRD = 55.7 mL/min/1.73m2    > Urine microalbumin/Creatinine ratio (5/11/2017) = 9 mg/g    > Acute renal failure superimposed on stage 3 chronic kidney disease   > BUN/Creatinine (5/10/2017) = 19/1.33    > BUN/Creatinine (5/11/2017, on admission) = 16/1.26    > On 5/12/2017:    > Discontinued Diclofenac 2 grams applied to affected areas 4 times daily     > Started Naproxen 375 mg PO BID with meals   > On 5/14/2017, resumed Eplerenone 25 mg PO once daily    > BUN/Creatinine (5/15/2017) = 22/1.87    > Urinalysis (5/15/2017) showed SG 1.014, no casts   > On 5/15/2017:    > Discontinued Naproxen 375 mg PO BID with meals    > Discontinued Eplerenone 25 mg PO once daily     > Decreased Gabapentin from 300 mg PO q 8 hr to 300 mg PO q 12 hr     > Decreased Famotidine from 20 mg PO BID to 20 mg PO once daily    > Patient was given IVF: 0.9%  ml/hr x 1 liter   > Increase oral fluid intake   > Continue IVF: 0.9%  ml/hr x 1 liter    > Difficulty sleeping    > Continue:    > Trazodone 100 mg PO q HS    > Diphenhydramine HCl 25 mg PO q HS    > Urinary urgency   > Continue Diphenhydramine HCl 25 mg PO q HS (re: ADR is urinary retention)    > Analgesia   > On 5/12/2017:    > Discontinued Diclofenac 2 grams applied to affected areas 4 times daily     > Started Naproxen 375 mg PO BID with meals   > On 5/15/2017, discontinued Naproxen 375 mg PO BID with meals (re: decrease in CrCl)   > Continue:    > Acetaminophen 650 mg PO q 4 hr PRN for pain level less than 5/10    > Norco 5/325 1 tab PO q 4 hr PRN for pain level greater than 4/10      8. Patient's progress in rehabilitation and medical issues discussed with the patient. All questions answered to the best of my ability. Care plan discussed with patient and nurse.       Signed:    Kenny Galan MD    May 16, 2017

## 2017-05-16 NOTE — INTERDISCIPLINARY ROUNDS
[facility] PHYSICAL REHABILITATION  [address] [place] [address], [address]    INPATIENT REHABILITATION  PRE-TEAM CONFERENCE SUMMARY     Date of Conference: 5/17/2017    Name: Racquel Rea Age / Sex: 62 y.o. / female   CSN: [id number] MRN: [id number]   [facility] Date: 5/10/2017 Length of Stay: 6 days     Primary Rehabilitation Diagnosis  1. Impaired Mobility and ADLs  2. Systemic inflammatory response syndrome (SIRS) secondary to:  a. Cellulitis of the right forearm  b. Olecranon bursitis of the right elbow  c. Contusion of left elbow  d.  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 congestive heart failure    Depression    History of back injury    Anxiety    Wears glasses    History of hepatitis C    Sickle cell trait (HCC)    History of acute renal failure    Hypothyroidism    Recurrent genital herpes    Sarcoidosis ([facility] [address]    History pf abscess of right arm    Hypoxemia requiring supplemental oxygen    Abnormal nuclear stress test    History of cellulitis and abscess of hand    History of cellulitis and abscess of foot    Intravenous drug user    History of penicillin allergy    Falls frequently    Gastroesophageal reflux disease with hiatal hernia    Memory difficulty    Mixed connective tissue disease     CKD stage G3a/A1, GFR 45-59 and albumin creatinine ratio <30 mg/g     Acute renal failure superimposed on stage 3 chronic kidney disease         Therapy:     FIM SCORES Initial Assessment Weekly Progress Assessment 5/16/2017   Eating Functional Level: 2  Comments: Pt demonstrates ability to scoop eggs using fork and bring them to her mouth with significantly increaed time and effort, however she fatigues easily and needs assistance to feed herself majority of a meal. Pt is able to hold a small cup with her R hand and bring to her mouth to drink. 5 - Setup   Swallowing     Grooming 1  4 - Min A   Bathing 1  NT      Upper Body Dressing Functional Level: 1  Items Applied/Steps Completed: Bra (3 steps), Pullover (4 steps)  Comments: Pt donned bra and t-shirt while sitting up on EOB with SBA for sitting balance and safety. Pt required Total A for threading shirt over BUEs and to pull shirt overhead due to impaired BUE AROM and strength. 3 - Mod A   Lower Body Dressing Functional Level: 1  Items Applied/Steps Completed: Elastic waist pants (3 steps), Sock, left (1 step), Sock, right (1 step)  Comments: Pt requires Total A for managing all aspects of LB dressing at bed level. Pt performed rolling with Max-Total A x1 while second person assisted with donning of brief and pants over buttocks. Pt unable to reach her feet and needs assistance for doffing/donning socks and pants over B feet. 1 - Total A   Toileting Functional Level: 1  Comments: Pt completed toileting task at bed level utilizing bed pain. She needs the assistance of 2 people in order to perform all aspects of hygiene and clothing management with Max-Total A x1 for rolling.   2 - Max A   Bladder - level of assist 2     Bladder - accident frequency score 6     Bowel - level of assist 3     Bowel - accident frequency score 6     Toilet Transfer [place] Toilet Transfer Score: 0  Comments: Based on bed to w/c transfer, pt would require the assistance of 2 people for ant and up weightshift, balance, weightshift, RLE advance, and slow controlled descent with stand to sit to complete stand step transfer without AD to/from [facility]. 4 - Min A   Tub/Shower Transfer [place] Tub or Shower Type: Tub/Shower combination  Tub/Shower Transfer Score: 0  Comments: Pt not safe to perform shower transfer at this time due to impaired strength, balance, and overall activity tolerance. NT      Comprehension Primary Mode of Comprehension: Auditory  Score: 4        Expression Primary Mode of Expression: Verbal  Score: 4        Social Interaction Score: 4     Problem Solving Score: 3     Memory Score: 3       FIM SCORES Initial Assessment Weekly Progress Assessment 5/16/2017   Bed/Chair/Wheelchair Transfers Transfer Type: Lateral with transfer board  Other: stand step txfr without AD/anterior approach  Transfer Assistance : 2 (Maximal assistance)  Sit to Stand Assistance: Maximum assistance (in p-bars, pull to stand)  Car Transfers: Not tested  Car Type: n/a Other: Stand Step with RW  Transfer Assistance : 3 (Moderate assistance )  Sit to Stand Assistance:  Moderate assistance   Bed Mobility Rolling Right 1 (Total assistance)   Rolling Left 1 (Total assistance)   Supine to Sit 1 (Total assistance)   Sit to Stand Maximum assistance (in p-bars, pull to stand)   Sit to Supine 1 (Total assistance)    Rolling Right   NT   Rolling Left   NT   Supine to Sit   NT   Sit to Stand   Moderate assistance   Sit to Supine   NT      Locomotion (W/C) Able to Propel (ft): 10 feet  Functional Level: 1  Curbs/Ramps Assist Required (FIM Score): 0 (Not tested)  Wheelchair Setup Assist Required : 2 (Maximal assistance)  Wheelchair Management: Other (comment) (needs help to manage brakes at this time) Function: min to mod assist with max encouragement  Setup Assistance: Max assist     Locomotion (W/C distance) 10 Feet 50 Feet   Locomotion (Walk) 1 (Dependent/total assistance) Minimal assistance   Locomotion (Walk dist.) 0 Feet (ft) 82 Feet (ft)   Steps/Stairs Steps/Stairs Ambulated (#): 0  Level of Assist : 0 (Not tested) NT Nursing:     Neuro:   A&O x__4__                   Respiratory:   [x] WNL   [] O2   [] LPM ______   Other:  Peripheral Vascular:   [] TEDS present   [] Edema present ____ Grade   Cardiac:   [x] WNL   [] Other  Genitourinary:   [x] continent   [x] incontinent   [] rousseau  Abdominal _______ LBM  GI: ___diabetic____ Diet __thin____ Liquids _____ tube feeds  Musculoskeletal: __lateral with transfer board__ ROM Transfers _____ Assistive Device Used  __mod__ Level of Assistance  Skin Integumentary:   [] Intact   [x] Not Intact   __________Preventative Measures  Details______________________________________________________________  Pain: [x] Controlled   [] Not Controlled   Pain Meds:   [] Scheduled   [] PRN        Registered Dietitian / Nutrition:   Patient Vitals for the past 100 hrs:   % Diet Eaten   05/16/17 0900 60 %   05/15/17 1800 75 %   05/15/17 1300 65 %   05/15/17 0930 50 %   05/14/17 1836 75 %   05/14/17 1332 75 %   05/14/17 0949 50 %   05/13/17 1901 50 %   05/13/17 1300 75 %   05/13/17 1000 50 %   05/12/17 1800 50 %   05/12/17 1300 60 %     Patient reported appetite and meal intake are good. Discussed food preferences. Pt also stated sometimes hungry after meals. Discussed adding double vegetable BID and double meat once daily; pt agreed with plan    Supplements:          [] Yes   [x] No      Amount of supplement consumed:  not applicable       Intake/Output Summary (Last 24 hours) at 05/16/17 1415  Last data filed at 05/16/17 0900   Gross per 24 hour   Intake              540 ml   Output                0 ml   Net              540 ml                                Last bowel movement: 5/14      Interdisciplinary Team Goals:     1. Goal  Encourage pt to perform sit <> stand from w/c with minimal assistance and minimal reminders/verbal cues for efficient transfer. Barrier  Impaired strength, Impaired balance, Impaired transfers    Intervention  Strength and balance training, Transfer training     2. Goal  Encourage pt to perform UB bathing and dressing with minimal assistance. Barrier  Decreased strength, Decreased ROM, Increased pain    Intervention  ADL training, Therapeutic exercise, Therapeutic activity     3. Goal      Barrier      Intervention       4. Goal  Free from falls during rehab stay. Pain controlled <5 at rest.    Barrier  balance, increased pain    Intervention  bed/chair alarms, frequent rounding; pain meds as ordered     5. Goal Maintain mood stability during treatment effort on ARU    Barrier  History of mood disturbance    Intervention  Continue Rx and support /patient education     6. Goal  Po intake of meals will meet >75% of patient estimated nutritional needs within the next 7 days. Outcome:  [x] Met/Ongoing    []  Not Met    [] New/Initial Goal     Barrier  none known     Intervention  modified diet; update food preferences; add double vegetable BID and double meat once daily       Disposition / Discharge Planning: Follow-up therapy services:  tbd   DME recommendations:  tbd   Estimated discharge date:  5/31/17   Discharge Location:  home         Electronic Signatures:      Signature Date Signed   Physical Therapist    Otoniel Mukherjee, PT, DPT 5/16/17    Occupational Therapist    Lacy Hercules  5/16/2017   Speech Therapist         Recreational Therapist    Cheryl Valderrama, CTRS 5/16/17   Nursing    Radha Dunbar, JENNY     Dietitian    Mara Moreno, RD  5/16/17   Clinical Psychologist    Colin Andujar, Ph.D. 5/17/17    Physician    Jerrod Burnham. Radha Osorio MD  5/16/2017       Hilarie Seip, MSW  5/16/17         The above information has been reviewed with the patient in a language that they can understand. Opportunity for comments and questions has been provided and a signed attestation has been scanned into the \"media tab\" of the EMR.       Patient Signature: ______________________________________________________    Date Signed: __________________________________________________________

## 2017-05-16 NOTE — PROGRESS NOTES
Problem: Self Care Deficits Care Plan (Adult)  Goal: *Acute Goals and Plan of Care (Insert Text)  Long Term Goals (to be met upon discharge date) in order to increase pts functional independence and safety, and decrease burden of care:  1. Pt will perform self-feeding with setup. 2. Pt will perform grooming with setup  3. Pt will perform UB bathing with Min A.  4. Pt will perform LB bathing with Min A.  5. Pt will perform tub/shower transfer with Min A.   6. Pt will perform UB dressing with Min A.  7. Pt will perform LB dressing with Min A.  8. Pt will perform toileting task with Min A.  9. Pt will perform toilet transfer with Min A. Short Term Weekly Goals for (2017 to 2017) in order to increase pts functional independence and safety, and decrease burden of care:  1. Pt will perform self-feeding with Mod A.   2. Pt will perform grooming with Mod A.  3. Pt will perform UB bathing with Mod A.  4. Pt will perform LB bathing with Max A.  5. Pt will perform tub/shower transfer with Max A.  6. Pt will perform UB dressing with Max A.  7. Pt will perform LB dressing with Max A.  8. Pt will perform toileting task with Max A.  9. Pt will perform toilet transfer with Max A.   OCCUPATIONAL THERAPY DAILY NOTE  Patient Name:Stephen Sinha   Time In: 5393  Time Out: 1535     Medical Diagnosis:  Right forearm cellulits  SIRS (systemic inflammatory response syndrome) (HCC) SIRS (systemic inflammatory response syndrome) (HCC)      Pain at start of tx: 10/10  Pain at stop of tx: 9/10     Patient identified with name and : yes  Subjective: \"I'm telling you I hurt so bad, girl. You just don't understand. \"      Objective:      THERAPEUTIC ACTIVITY Daily Assessment     Pt sat on edge of mat while performing functional forward reaching and to increase BLE weightbearing while playing tabletop game. Pt demonstrates anterior weightshift outside SERA with min encouragement while using her RUE to play the game. THERAPEUTIC EXERCISE Daily Assessment     Pt positioned supine on mat table while participating in BUE AROM/stretching exercise to include shoulder flexion/extension, ab-/adduction, horizontal ab-/adduction, elbow flexion/extension, wrist extension, and digit flexion/extension x 10-12 reps each. Pt needed Min A and encouragement to attempt shoulder movements through full range. MOBILITY/TRANSFERS Daily Assessment      Pt performed stand step transfer w/c <> elevated mat table using RW with CGA and additional time/encouragement for initial sit to stand from w/c. Assessment: Pt continues to make steady progress with functional mobility utilizing RW and ability to perform functional reaching tasks, however she does remain limited by UE pain and needs encouragement to attempt to complete exercises and utilize UEs. Plan of Care: Continue POC to maximize pt independence and safety performing ADLs and functional transfers/mobility.      Dannielle Rm, OTR  5/16/2017

## 2017-05-16 NOTE — CONSULTS
ARU PSYCHOLOGICAL SCREENING    Assessment Initiated:  May 15, 2017    Rehab Diagnosis:  Right Forearm Cellulitis and SIRS    Pertinent Physical/Psychiatric History:     Patient Active Problem List   Diagnosis Code    Type 2 diabetes with stage 3 chronic kidney disease GFR 30-59 ([facility]) E11.22, N18.3    Diabetic neuropathy associated with type 2 diabetes mellitus ([facility] [address] E11.40    Venous insufficiency I87.2    Obesity, Class I, BMI 30-34.9 E66.9    Chronic back pain M54.9, G89.29    Generalized osteoarthritis of multiple sites M15.9    Bipolar affective disorder ([facility]) F31.9    Abnormally low high density lipoprotein (HDL) cholesterol with hypertriglyceridemia E78.6, T76.5    Diastolic dysfunction without heart failure I51.9    Chronic obstructive pulmonary disease (COPD) ([facility]) J44.9    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 congestive heart failure I13.10, N18.3    CKD stage G3a/A1, GFR 45-59 and albumin creatinine ratio <30 mg/g N18.3    Depression F32.9    History of back injury Z87.828    Anxiety F41.9    Wears glasses Z97.3    History of hepatitis C Z86.19    Sickle cell trait ([facility]) D57.3    History of acute renal failure Z87.448    Hypothyroidism E03.9    Recurrent genital herpes A60.00    Sarcoidosis ([facility]) D86.9    Abscess of right arm L02.413    Hypoxemia requiring supplemental oxygen R09.02, Z99.81    Abnormal nuclear stress test R94.39    Cellulitis and abscess of hand L03.119, L02.519    Cellulitis and abscess of foot L03.119, L02.619    SIRS (systemic inflammatory response syndrome) ([facility]) R65.10    Cellulitis of right forearm L03. 113    Olecranon bursitis of right elbow M70.21    Contusion of left elbow S50. 02XA    Intravenous drug user F19.90    Right Achilles tendinitis M76.61    History of penicillin allergy Z88.0    Hypokalemia E87.6    Falls frequently R29.6    Gastroesophageal reflux disease with hiatal hernia K21.9, K44.9    Memory difficulty R41.3    Mixed connective tissue disease (HCC) M35.1    Impaired mobility and ADLs Z74.09    Acute renal failure superimposed on stage 3 chronic kidney disease (HCC) N17.9, N18.3       Patient receives psychiatric services at [facility] in [place] and current psychotropic medications include: Depakote, Abilify, Zoloft and Trazodone with diagnoses including Bipolar Disorder with Anxiety and Depression symptoms. Additionally, patient has history of IV cocaine and opioid abuse and dependence, as well as tobacco use. OBJECTIVE  GENERAL OBSERVATIONS  Willingness to participate in program: [] good   [x] fair [] indifferent [] poor    General Appearance:  Patient observed obese, casually dressed in greens, having continuous O2 in place, and sitting upright in wheelchair. Patient's effect hand is observed swollen and bandaged. Sensory Impairments:  Patient wears eyeglasses for correction. She has satisfactory auditory reception and comprehension and is able to respond to inquiry with intelligibility.       Lutheran Affiliation:  Druze    Admission Assessment  Discharge Status   [x] alert  [] lethargic  [] difficult to arouse  [] fluctuating  [] other: Level of Consciousness [x] alert  [] lethargic  [] difficult to arouse  [] fluctuating  [] other:   [x] person  [x] place  [x] time  [x] situation Oriented [x] person  [x] place  [x] time  [x] situation   [x] within normal limits  [] impaired       [] mild        [] moderate        [] severe Attention [x] within normal limits  [x] impaired       [x] mild        [] moderate        [] severe   [x] within normal limits  [x] impaired       [x] mild        [] moderate        [] severe Memory [x] within normal limits  [x] impaired       [x] mild        [] moderate        [] severe   [x] appropriate to situation  [] depressed  [] anxious  [] angry   [] fearful  [] emotionally labile  [] other:  Mood [x] appropriate to situation  [] depressed  [] anxious  [] angry   [] fearful  [] emotionally labile  [] other:   [] appropriate  [x] flat  [] inappropriate to content of speech Affect [x] appropriate  [] flat  [] inappropriate to content of speech   [] appropriate  [] aggressive/agitated  [] withdrawn  [] inappropriate  [x] other: Dependent features Behavior [] appropriate  [] aggressive/agitated  [] withdrawn  [] inappropriate  [x] other: Continues to display significant dependent thinking and behavior. [] good  [x] limited  [] denial  [] none Insight Into Illness [] good  [x] limited  [] denial  [] none   [x] intact  [x] impaired       [x] mild        [] moderate        [] severe       Describe: Patient will benefit from cues and prompts for maximum problem solving and recall, especially with novel or complex information. Cognition [x] intact  [x] impaired       [x] mild        [] moderate        [] severe       Describe:    [x] coping  [] demonstrates poor adjustment  [] undetermined       As evidenced by:  Patient Adjustment to Disability [x] coping  [] demonstrates poor adjustment  [] undetermined       As evidenced by:    [] coping  [] demonstrates poor adjustment  [x] undetermined      As evidenced by: Not available on evaluation. Family Adjustment to Disability [x] coping  [] demonstrates poor adjustment  [] undetermined      As evidenced by:      ASSESSMENT  Clinical Impression:  Patient is a 62year old, single, retired on [employer] [employer] (secondary to multiple medical problems),  female. She resides alone in one [facility] with five steps to enter; three children reside locally and are in contact. Patient seems to have limited insight about the parameters of acute rehabilitation and will benefit from further explanation and education to help her better identify realistic goals for herself, as well as to encourage her to persevere.   In fact, she presents as somewhat dependent and asking for some assistance when, perhaps, she might try to do more for herself. Emotionally, patient denies having acute feelings of distress. Therapy team reports patient displaying some evidence of anxiety, although she denies having subjective feelings for same. On the other hand, this is a patient with an apparent history of chronic anxiety and depression and diagnosis for Bipolar disorder, for which she is now receiving psychiatric services at the [facility]; current psychotropic medications include: Depakote, Ability, Zoloft and Trazodone, which will continue on ARU and be monitored by Dr. Ronald Drew. Perhaps, patient's self report of limited to no feelings of anxiety reflect the fact that she is stabilized with various medications and she is not experiencing the kind of anxiety that has been very problematic for her, in the past.  Regardless, she will be monitored for emotional and/or behavioral difficulties while on ARU and encouraged to persevere in her therapy effort. If therapy team continues to feel that anxiety is interfering with her mobility efforts, then desensitization for same will be appropriate. Cognitively, she is alert and oriented, able to understand inquiry and responds with intelligibility. Records indicate some history of \"memory loss\" but patient is able to recall simple, new information presented to her. She will obviously benefit from cues, prompts and redirection to maximize this effort as necessary in treatment. Patient Strengths:  Alert, somewhat detached but cooperative, able to understand and respond to inquiry, and patient insisting \"I was mobile\" prior to hospital.    Patient Preferences:  Return to home with greater function and mobility. Rehab Potential:  Fair, depending on strength, endurance and sustained effort. Educational Needs: Under each heading list the specific items in which the patient or family will need education/training. Example: hip precautions, use of walker, ADL equipment, neglect, judgment, adjustment, etc.     Special considerations or accommodations for teaching:  [x] Yes     [] No     [] NA  If Yes, explain: Possible mood disturbance impacting patient's sustained effort in treatment.  Discharge Status    Completed Demonstrated/ Verbalized Understanding    Yes No Yes No   Info regarding disability: Limited insight about parameters of recovery [x] [] [x] []   Adjustment: Anxiety with increased dependency [x] [] [x] []   Cognition: Possible memory difficulties (as reported) [x] [] [x] []   Other: Safety [x] [] [x] []   Other: Education about substance abuse issues [x] [] [x] []   If education not completed, explain: [] [] [] []     PLAN  Problem: Limited insight about all aspects of recovery  Long Term Goal: Increase insight about recovery  Intervention: Patient education  At Discharge - LTG Achieved: [x] Yes [] No If not achieved, explain:    Problem: Anxiety with dependency  Long Term Goal: Minimize anxiety  Intervention: Support  and possible desensitization  At Discharge - LTG Achieved: [x] Yes [] No If not achieved, explain:    Problem: Safety  Long Term Goal: Maximize safety awareness  Intervention: Educate and   At Discharge - LTG Achieved: [x] Yes [] No If not achieved, explain:    Problem: Reported difficulty with memory  Long Term Goal: Maximize recall of treatment information  Intervention: Cues, prompts and repetition  At Discharge - LTG Achieved: [x] Yes [] No If not achieved, explain:    Problem: History of substance abuse  Long Term Goal: Consider treatment for same  Intervention: Patient education/referral  At Discharge - LTG Achieved: [x] Yes [] No If not achieved, explain:    Nirmal Foss, THE [facility]  5/16/2017 8:41 AM    DISCHARGE STATUS    Clinical Impressions: Patient worked to her level of ability in treatment on ARU although was often observed to display more dependent tendencies and behavior than was necessary. She was frequently redirected to try and do more for herself. Patient's anxiety was manageable; other symptoms associated with her extensive mental health history of problems were not severe enough to interfere with her therapy effort. In fact, she was typically calm on contact. Patient was encouraged to attend to all safety issues on discharge. She will continue with her outpatient mental health services. She did not require referral to drug treatment services.     Follow-up Services Recommended Purpose                 Carlos Smith, PHD  Discharge Date/Time:

## 2017-05-16 NOTE — REHAB NOTE
[facility]  Bedside and Verbal shift change report given to Josue Huynh (oncoming nurse) by Nirmal Spencer LPN   (offgoing nurse). Report included the following information SBAR, Kardex, MAR and Recent Results.     Situation:   Code Status: Full Code   Reason for Admission: [facility] Day: 5   Problem List:   Hospital Problems  Date Reviewed: 5/15/2017          Codes Class Noted POA    Acute renal failure superimposed on stage 3 chronic kidney disease ([facility] ICD-10-CM: N17.9, N18.3  ICD-9-CM: 584.9, 585.3  5/15/2017 No        Cellulitis of right forearm ICD-10-CM: L03.113  ICD-9-CM: 682.3  5/4/2017 Yes        Olecranon bursitis of right elbow ICD-10-CM: M70.21  ICD-9-CM: 726.33  5/4/2017 Yes        Contusion of left elbow ICD-10-CM: S50.02XA  ICD-9-CM: 923.11  5/4/2017 Yes        Impaired mobility and ADLs ICD-10-CM: Z74.09  ICD-9-CM: 799.89  5/4/2017 Yes        * (Principal)SIRS (systemic inflammatory response syndrome) ([facility] ICD-10-CM: R65.10  ICD-9-CM: 995.90  5/2/2017 Yes        Right Achilles tendinitis ICD-10-CM: M76.61  ICD-9-CM: 726.71  5/2/2017 Yes        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 congestive heart failure (Chronic) ICD-10-CM: I13.10, N18.3  ICD-9-CM: 404.10, 585.3  Unknown Yes    Overview Signed 4/23/2013 10:02 AM by Hyun Hardy controlled             Type 2 diabetes with stage 3 chronic kidney disease GFR 30-59 ([facility] (Chronic) ICD-10-CM: E11.22, N18.3  ICD-9-CM: 250.40, 585.3  Unknown Yes              Background:   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 congestive heart failure     Better controlled     Bipolar affective disorder ([facility] 12/5/2012    Cellulitis of right forearm 5/4/2017    Chronic back pain     Chronic obstructive pulmonary disease (COPD) (HCC)     SOB, on paula O2 Recent admission with psychosis     CKD stage G3a/A1, GFR 45-59 and albumin creatinine ratio <30 mg/g 5/11/2017    Urine microalbumin/Creatinine ratio (5/11/2017) = 9 mg/g    Contusion of left elbow 5/4/2017    Depression     Diabetic neuropathy associated with type 2 diabetes mellitus (HCC)     Diastolic dysfunction without heart failure     Stable on diuretics     Falls frequently     Gastroesophageal reflux disease with hiatal hernia     Generalized osteoarthritis of multiple sites     History of acute renal failure 5/31/2013    History of back injury     jumped out of second story window     History of hepatitis C     treated    History of penicillin allergy     Hypothyroidism     Hypoxemia requiring supplemental oxygen 12/29/2014    Intravenous drug user 5/2/2017    Memory difficulty     Mixed connective tissue disease ([facility] 5/10/2017    Obesity, Class I, BMI 30-34.9     Olecranon bursitis of right elbow 5/4/2017    Recurrent genital herpes 5/31/2013    Right Achilles tendinitis 5/2/2017    Sarcoidosis ([facility]     Sickle cell trait ([facility]     Type 2 diabetes with stage 3 chronic kidney disease GFR 30-59 ([facility]     Venous insufficiency     Wears glasses       Patient taking anticoagulants yes    Patient has a defibrillator: no     Assessment:   Changes in Assessment throughout shift: no     Patient has central line: no Reasons if yes: Insertion date: Last dressing date:   Patient has Renae Cath: no Reasons if yes:     Insertion date:     Last Vitals:     Vitals:    05/14/17 1553 05/14/17 2051 05/15/17 0802 05/15/17 1610   BP: 94/62 118/75 132/73 115/67   Pulse: 70 70 (!) 59 68   Resp: 16 18 18 20   Temp: 97.3 °F (36.3 °C) 98.4 °F (36.9 °C) 97.7 °F (36.5 °C) 98.2 °F (36.8 °C)   SpO2: 98% 100% 97% 98%   Weight: Height:       LMP: 11/25/2012        PAIN    Pain Assessment    Pain Intensity 1: 9 (05/15/17 1921) Pain Intensity 1: 2 (12/29/14 1105)    Pain Location 1: Generalized Pain Location 1: Abdomen    Pain Intervention(s) 1: Medication (see MAR) Pain Intervention(s) 1: Medication (see MAR)  Patient Stated Pain Goal: 0 Patient Stated Pain Goal: 0  o Intervention effective: yes   o Other actions taken for pain: repositioning     Skin Assessment  Skin color Skin Color: Appropriate for ethnicity  Condition/Temperature Skin Condition/Temp: Dry, Warm  Integrity Skin Integrity: Wound (add Wound LDA)  Turgor Turgor: Non-tenting  Weekly Pressure Ulcer Documentation  Pressure  Injury Documentation: No Pressure Injury Noted-Pressure Ulcer Prevention Initiated  Wound Prevention & Protection Methods  Orientation of wound Orientation of Wound Prevention: Posterior  Location of Prevention Location of Wound Prevention: Sacrum/Coccyx  Dressing Present Dressing Present : No  Dressing Status    Wound Offloading Wound Offloading (Prevention Methods): Bed, pressure redistribution/air     INTAKE/OUPUT    Date 05/14/17 1900 - 05/15/17 0659 05/15/17 0700 - 05/16/17 0659   Shift 1104-4054 24 Hour Total 7949-3239 0000-2957 24 Hour Total   I  N  T  A  K  E   P.O.  620 620  620      P. O.  620 620  620    Shift Total  (mL/kg)  620  (6.5) 620  (6.5)  620  (6.5)   O  U  T  P  U  T   Urine  (mL/kg/hr)           Urine Occurrence(s) 3 x 5 x 4 x  4 x    Stool           Stool Occurrence(s) 0 x 1 x 0 x  0 x    Shift Total  (mL/kg)        NET  620 620  620   Weight (kg) 95.3 95.3 95.3 95.3 95.3       Recommendations:  1. Patient needs and requests: none    2. Diet: diabetic    3. Pending tests/procedures: am labs     4. Functional Level/Equipment: assist x 2/wheelchair    5.  Estimated Discharge Date: tbd Posted on Whiteboard in Patients Room: no     HEALS Safety Check    A safety check occurred in the patient's room between off going nurse and oncoming nurse listed above. The safety check included the below items  Area Items   H  High Alert Medications - Verify all high alert medication drips (heparin, PCA, etc.)   E  Equipment - Suction is set up for ALL patients (with lashay)  - Red plugs utilized for all equipment (IV pumps, etc.)  - WOWs wiped down at end of shift.  - Room stocked with oxygen, suction, and other unit-specific supplies   A  Alarms - Bed alarm is set for fall risk patients  - Ensure chair alarm is in place and activated if patient is up in a chair   L  Lines - Check IV for any infiltration  - Renae bag is empty if patient has a Renae   - Tubing and IV bags are labeled   S  Safety   - Room is clean, patient is clean, and equipment is clean. - Hallways are clear from equipment besides carts. - Fall bracelet on for fall risk patients  - Ensure room is clear and free of clutter  - Suction is set up for ALL patients (with lashay)  - Hallways are clear from equipment besides carts.    - Isolation precautions followed, supplies available outside room, sign posted   Shar Flores LPN

## 2017-05-17 LAB
GLUCOSE BLD STRIP.AUTO-MCNC: 137 MG/DL (ref 70–110)
GLUCOSE BLD STRIP.AUTO-MCNC: 181 MG/DL (ref 70–110)
GLUCOSE BLD STRIP.AUTO-MCNC: 210 MG/DL (ref 70–110)
GLUCOSE BLD STRIP.AUTO-MCNC: 82 MG/DL (ref 70–110)

## 2017-05-17 PROCEDURE — 82962 GLUCOSE BLOOD TEST: CPT

## 2017-05-17 PROCEDURE — 74011250637 HC RX REV CODE- 250/637: Performed by: INTERNAL MEDICINE

## 2017-05-17 PROCEDURE — 97535 SELF CARE MNGMENT TRAINING: CPT

## 2017-05-17 PROCEDURE — 65310000000 HC RM PRIVATE REHAB

## 2017-05-17 PROCEDURE — 74011250636 HC RX REV CODE- 250/636: Performed by: INTERNAL MEDICINE

## 2017-05-17 PROCEDURE — 74011636637 HC RX REV CODE- 636/637: Performed by: INTERNAL MEDICINE

## 2017-05-17 PROCEDURE — 97116 GAIT TRAINING THERAPY: CPT

## 2017-05-17 RX ORDER — INSULIN GLARGINE 100 [IU]/ML
20 INJECTION, SOLUTION SUBCUTANEOUS
Status: DISCONTINUED | OUTPATIENT
Start: 2017-05-17 | End: 2017-05-19

## 2017-05-17 RX ORDER — OXYCODONE AND ACETAMINOPHEN 5; 325 MG/1; MG/1
1 TABLET ORAL
Status: DISCONTINUED | OUTPATIENT
Start: 2017-05-17 | End: 2017-05-31 | Stop reason: HOSPADM

## 2017-05-17 RX ORDER — SODIUM CHLORIDE 9 MG/ML
1000 INJECTION, SOLUTION INTRAVENOUS ONCE
Status: DISCONTINUED | OUTPATIENT
Start: 2017-05-17 | End: 2017-05-18

## 2017-05-17 RX ORDER — INSULIN GLARGINE 100 [IU]/ML
40 INJECTION, SOLUTION SUBCUTANEOUS
Status: DISCONTINUED | OUTPATIENT
Start: 2017-05-18 | End: 2017-05-19

## 2017-05-17 RX ORDER — SODIUM CHLORIDE 9 MG/ML
1000 INJECTION, SOLUTION INTRAVENOUS ONCE
Status: COMPLETED | OUTPATIENT
Start: 2017-05-17 | End: 2017-05-17

## 2017-05-17 RX ORDER — OXYCODONE AND ACETAMINOPHEN 5; 325 MG/1; MG/1
1 TABLET ORAL 3 TIMES DAILY
Status: DISCONTINUED | OUTPATIENT
Start: 2017-05-17 | End: 2017-05-31 | Stop reason: HOSPADM

## 2017-05-17 RX ORDER — TOLTERODINE TARTRATE 1 MG/1
1 TABLET, EXTENDED RELEASE ORAL 2 TIMES DAILY
Status: DISCONTINUED | OUTPATIENT
Start: 2017-05-17 | End: 2017-05-31 | Stop reason: HOSPADM

## 2017-05-17 RX ADMIN — HEPARIN SODIUM 5000 UNITS: 5000 INJECTION, SOLUTION INTRAVENOUS; SUBCUTANEOUS at 21:16

## 2017-05-17 RX ADMIN — NEPHROCAP 1 CAPSULE: 1 CAP ORAL at 09:04

## 2017-05-17 RX ADMIN — ASPIRIN 81 MG CHEWABLE TABLET 81 MG: 81 TABLET CHEWABLE at 09:04

## 2017-05-17 RX ADMIN — GABAPENTIN 300 MG: 300 CAPSULE ORAL at 05:49

## 2017-05-17 RX ADMIN — ROSUVASTATIN CALCIUM 5 MG: 5 TABLET ORAL at 21:13

## 2017-05-17 RX ADMIN — DOCUSATE SODIUM 100 MG: 100 CAPSULE, LIQUID FILLED ORAL at 17:15

## 2017-05-17 RX ADMIN — HYDROCODONE BITARTRATE AND ACETAMINOPHEN 1 TABLET: 5; 325 TABLET ORAL at 01:02

## 2017-05-17 RX ADMIN — INSULIN GLARGINE 20 UNITS: 100 INJECTION, SOLUTION SUBCUTANEOUS at 21:15

## 2017-05-17 RX ADMIN — FAMOTIDINE 20 MG: 20 TABLET ORAL at 09:04

## 2017-05-17 RX ADMIN — Medication 400 MG: at 09:04

## 2017-05-17 RX ADMIN — OXYCODONE HYDROCHLORIDE AND ACETAMINOPHEN 1 TABLET: 5; 325 TABLET ORAL at 12:01

## 2017-05-17 RX ADMIN — HEPARIN SODIUM 5000 UNITS: 5000 INJECTION, SOLUTION INTRAVENOUS; SUBCUTANEOUS at 05:47

## 2017-05-17 RX ADMIN — SODIUM CHLORIDE 1000 ML: 900 INJECTION, SOLUTION INTRAVENOUS at 11:00

## 2017-05-17 RX ADMIN — HYDROCODONE BITARTRATE AND ACETAMINOPHEN 1 TABLET: 5; 325 TABLET ORAL at 09:03

## 2017-05-17 RX ADMIN — DIPHENHYDRAMINE HYDROCHLORIDE 25 MG: 25 CAPSULE ORAL at 21:13

## 2017-05-17 RX ADMIN — SODIUM CHLORIDE 1000 ML: 900 INJECTION, SOLUTION INTRAVENOUS at 15:32

## 2017-05-17 RX ADMIN — GABAPENTIN 300 MG: 300 CAPSULE ORAL at 17:15

## 2017-05-17 RX ADMIN — DOCUSATE SODIUM 100 MG: 100 CAPSULE, LIQUID FILLED ORAL at 09:04

## 2017-05-17 RX ADMIN — LEVOTHYROXINE SODIUM 100 MCG: 100 TABLET ORAL at 06:30

## 2017-05-17 RX ADMIN — CHOLECALCIFEROL TAB 25 MCG (1000 UNIT) 1000 UNITS: 25 TAB at 09:04

## 2017-05-17 RX ADMIN — DIVALPROEX SODIUM 250 MG: 250 TABLET, DELAYED RELEASE ORAL at 21:13

## 2017-05-17 RX ADMIN — HEPARIN SODIUM 5000 UNITS: 5000 INJECTION, SOLUTION INTRAVENOUS; SUBCUTANEOUS at 15:33

## 2017-05-17 RX ADMIN — OXYCODONE HYDROCHLORIDE AND ACETAMINOPHEN 1 TABLET: 5; 325 TABLET ORAL at 21:13

## 2017-05-17 RX ADMIN — OXYCODONE HYDROCHLORIDE AND ACETAMINOPHEN 1 TABLET: 5; 325 TABLET ORAL at 17:15

## 2017-05-17 RX ADMIN — SERTRALINE HYDROCHLORIDE 50 MG: 50 TABLET ORAL at 09:04

## 2017-05-17 RX ADMIN — INSULIN LISPRO 4 UNITS: 100 INJECTION, SOLUTION INTRAVENOUS; SUBCUTANEOUS at 17:19

## 2017-05-17 RX ADMIN — TRAZODONE HYDROCHLORIDE 100 MG: 50 TABLET ORAL at 21:13

## 2017-05-17 RX ADMIN — ARIPIPRAZOLE 10 MG: 10 TABLET ORAL at 09:04

## 2017-05-17 RX ADMIN — TOLTERODINE TARTRATE 1 MG: 1 TABLET, FILM COATED ORAL at 21:18

## 2017-05-17 RX ADMIN — Medication 400 MG: at 17:15

## 2017-05-17 NOTE — PROGRESS NOTES
Problem: Self Care Deficits Care Plan (Adult)  Goal: *Acute Goals and Plan of Care (Insert Text)  Long Term Goals (to be met upon discharge date) in order to increase pts functional independence and safety, and decrease burden of care:  1. Pt will perform self-feeding with setup. 2. Pt will perform grooming with setup  3. Pt will perform UB bathing with Min A.  4. Pt will perform LB bathing with Min A.  5. Pt will perform tub/shower transfer with Min A.   6. Pt will perform UB dressing with Min A.  7. Pt will perform LB dressing with Min A.  8. Pt will perform toileting task with Min A.  9. Pt will perform toilet transfer with Min A. Short Term Weekly Goals for (2017 to 2017) in order to increase pts functional independence and safety, and decrease burden of care:  1. Pt will perform self-feeding with Mod A.   2. Pt will perform grooming with Mod A.  3. Pt will perform UB bathing with Mod A.  4. Pt will perform LB bathing with Max A.  5. Pt will perform tub/shower transfer with Max A.  6. Pt will perform UB dressing with Max A.  7. Pt will perform LB dressing with Max A.  8. Pt will perform toileting task with Max A.  9. Pt will perform toilet transfer with Max A.   OCCUPATIONAL THERAPY DAILY NOTE  Patient Name:Stephen Sinha  Time Spent With Patient  Time In: 1030  Time Out: 1200     Medical Diagnosis:  Right forearm cellulits  SIRS (systemic inflammatory response syndrome) (HCC) SIRS (systemic inflammatory response syndrome) (HCC)      Pain at start of tx: Pt reports increased pain in B arms today. Pain at stop of tx: Pt reports continued pain throughout multiple joints in her body. Medication administered by nurse at the end of tx session. Patient identified with name and : yes  Subjective: Pt agreeable to perform full shower during today's tx session.  She makes frequent requests for OT to assist with tasks and needed encouragement to attempt to participate with increased independence before asking for help. OT educated pt on the purpose of OT to teach her methods and techniques to improve her independence and safety with performing ADLs. Pt states understanding though continues to make multiple requests for assistance without first attempting. Objective: Pt performed full body shower; see below for ADL and functional transfer levels. GROOMING Daily Assessment     Grooming  Grooming Assistance : 4 (Minimal assistance)  Comments: Pt washed her face during shower with setup for OT to provide pt with washcloth. She combed her hair with initial encouragement needed to attempt. She needed assistance to comb the back of her head as she was unable to reach. UPPER BODY BATHING Daily Assessment     Upper Body Bathing  Bathing Assistance, Upper: 4 (Minimal assistance)  Position Performed: Seated in chair  Comments: Pt able to wash full UB bathing with encouragement to attempt to initiate task. Pt required assistance for washing RUE and axilla area due to impaired RUE ROM and increased pain. LOWER BODY BATHING Daily Assessment     Lower Body Bathing  Bathing Assistance, Lower : 4 (Minimal assistance)  Position Performed: Seated in chair  Adaptive Equipment: Long handled sponge  Comments: Pt completed LB bathing while seated on shower bench. Following education and encouragement to utilize, pt able to use LHS to wash B lower legs and R foot. Additional cues also needed to scoot forward on bench in order for pt to efficiently wash tyrell area. Pt unable to reach to wash buttocks, therefore OT assisted pt with washing buttocks while she weightshifted side to side with supervision. UPPER BODY DRESSING Daily Assessment     Upper Body Dressing   Dressing Assistance : 3 (Moderate assistance)  Comments: Pt doffed pullover shirt with significantly increased time and encouragement to do so.  To shante shirt, she is able to thread UEs into shirt sleeves then refused to attempt to pull shirt overhead due to decreased UE mobility and increased pain. She is dependent for management of bra fastener due to inability to reach behind and decreased hand strength/coordination. LOWER BODY DRESSING Daily Assessment     Lower Body Dressing   Dressing Assistance : 3 (Moderate assistance)  Leg Crossed Method Used: No  Position Performed: Seated in chair;Standing  Adaptive Equipment Used: Reacher;Sock aid  Comments: OT educated pt on use of reacher and sock aid to attempt to increase independence with LB dressing. Following education and demonstration, pt was able to push pants off B feet with significantly increased time and effort. She needed Min A for managing socks over her heels using reacher. Pt required additional assistance for threading LEs into pants legs. She was able to pull brief and pants up over buttocks with slight assistance needed for getting them up all the way in the back while standing with CGA. Pt unable to shante socks with sock aid despite maximal encouragement and cues for the technique due to c/o increased RLE and UE pain when attempting to implement the technique. MOBILITY/TRANSFERS Daily Assessment     Functional Transfers  Tub or Shower Type: Shower  Amount of Assistance Required: 4 (Minimal assistance)  Adaptive Equipment: Tub transfer bench;Walker (comment)   Pt performed stand step transfer w/c <> tub transfer bench in roll-in shower utilizing RW with Min A needed for ant and up weightshift and to control descent with stand to sit. Pt able to maintain balance while stepping with RW with CGA only. Assessment: Pt demonstrates increased functional performance as she was able to participate in full body shower this treatment session. She presents with increased independence with all ADLs with encouragement to perform at highest level. Plan of Care: Continue POC to maximize pt independence and safety performing ADLs and functional transfers/mobility.      Megan Frausto Geraldine Ortiz  5/17/2017

## 2017-05-17 NOTE — INTERDISCIPLINARY ROUNDS
[facility] PHYSICAL REHABILITATION  [address] [place] [address], [address]    INPATIENT REHABILITATION  TEAM CONFERENCE SUMMARY     Date of Conference: 5/17/2017    Name: Kaushal Daniels Age / Sex: 62 y.o. / female   CSN: [id number] MRN: [id number]   [facility] Date: 5/10/2017 Length of Stay: 7 days     Primary Rehabilitation Diagnosis  1. Impaired Mobility and ADLs  2. Systemic inflammatory response syndrome (SIRS) secondary to:   a. Cellulitis of the right forearm   b. Olecranon bursitis of the right elbow   c. Contusion of left elbow   d.  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 congestive heart failure    Depression    History of back injury    Anxiety    Wears glasses    History of hepatitis C    Sickle cell trait (HCC)    History of acute renal failure    Hypothyroidism    Recurrent genital herpes    Sarcoidosis ([facility] [address]    History pf abscess of right arm    Hypoxemia requiring supplemental oxygen    Abnormal nuclear stress test    History of cellulitis and abscess of hand    History of cellulitis and abscess of foot    Intravenous drug user    History of penicillin allergy    Falls frequently    Gastroesophageal reflux disease with hiatal hernia    Memory difficulty    Mixed connective tissue disease     CKD stage G3a/A1, GFR 45-59 and albumin creatinine ratio <30 mg/g     Acute renal failure superimposed on stage 3 chronic kidney disease         Therapy:     FIM SCORES Initial Assessment Weekly Progress Assessment 5/17/2017   Eating Functional Level: 2  Comments: Pt demonstrates ability to scoop eggs using fork and bring them to her mouth with significantly increaed time and effort, however she fatigues easily and needs assistance to feed herself majority of a meal. Pt is able to hold a small cup with her R hand and bring to her mouth to drink. 5 - Setup   Swallowing     Grooming 1 Grooming Assistance : 4 (Minimal assistance)  Comments: Pt washed her face during shower with setup for OT to provide pt with washcloth. She combed her hair with initial encouragement needed to attempt. She needed assistance to comb the back of her head as she was unable to reach. 4 - Min A   Bathing 1 Bathing Assistance, Upper: 4 (Minimal assistance)  Position Performed: Seated in chair  Comments: Pt able to wash full UB bathing with encouragement to attempt to initiate task. Pt required assistance for washing RUE and axilla area due to impaired RUE ROM and increased pain. NT  Bathing Assistance, Lower : 4 (Minimal assistance)  Position Performed: Seated in chair  Adaptive Equipment: Long handled sponge  Comments: Pt completed LB bathing while seated on shower bench. Following education and encouragement to utilize, pt able to use LHS to wash B lower legs and R foot. Additional cues also needed to scoot forward on bench in order for pt to efficiently wash tyrell area. Pt unable to reach to wash buttocks, therefore OT assisted pt with washing buttocks while she weightshifted side to side with supervision. Upper Body Dressing Functional Level: 1  Items Applied/Steps Completed: Bra (3 steps), Pullover (4 steps)  Comments: Pt donned bra and t-shirt while sitting up on EOB with SBA for sitting balance and safety. Pt required Total A for threading shirt over BUEs and to pull shirt overhead due to impaired BUE AROM and strength.  Dressing Assistance : 3 (Moderate assistance)  Comments: Pt doffed pullover shirt with significantly increased time and encouragement to do so. To shante shirt, she is able to thread UEs into shirt sleeves then refused to attempt to pull shirt overhead due to decreased UE mobility and increased pain. She is dependent for management of bra fastener due to inability to reach behind and decreased hand strength/coordination. 3 - Mod A   Lower Body Dressing Functional Level: 1  Items Applied/Steps Completed: Elastic waist pants (3 steps), Sock, left (1 step), Sock, right (1 step)  Comments: Pt requires Total A for managing all aspects of LB dressing at bed level. Pt performed rolling with Max-Total A x1 while second person assisted with donning of brief and pants over buttocks. Pt unable to reach her feet and needs assistance for doffing/donning socks and pants over B feet. Dressing Assistance : 3 (Moderate assistance)  Leg Crossed Method Used: No  Position Performed: Seated in chair;Standing  Adaptive Equipment Used: Reacher;Sock aid  Comments: OT educated pt on use of reacher and sock aid to attempt to increase independence with LB dressing. Following education and demonstration, pt was able to push pants off B feet with significantly increased time and effort. She needed Min A for managing socks over her heels using reacher. Pt required additional assistance for threading LEs into pants legs. She was able to pull brief and pants up over buttocks with slight assistance needed for getting them up all the way in the back while standing with CGA. Pt unable to shante socks with sock aid despite maximal encouragement and cues for the technique due to c/o increased RLE and UE pain when attempting to implement the technique. 1 - Total A   Toileting Functional Level: 1  Comments: Pt completed toileting task at bed level utilizing bed pain.  She needs the assistance of 2 people in order to perform all aspects of hygiene and clothing management with Max-Total A x1 for rolling. 2 - Max A   Bladder - level of assist 2 6   Bladder - accident frequency score 6 6   Bowel - level of assist 3 1   Bowel - accident frequency score 6     Toilet Transfer [place] Toilet Transfer Score: 0  Comments: Based on bed to w/c transfer, pt would require the assistance of 2 people for ant and up weightshift, balance, weightshift, RLE advance, and slow controlled descent with stand to sit to complete stand step transfer without AD to/from [facility]. 4 - Min A   Tub/Shower Transfer [place] Tub or Shower Type: Tub/Shower combination  Tub/Shower Transfer Score: 0  Comments: Pt not safe to perform shower transfer at this time due to impaired strength, balance, and overall activity tolerance.  ShowerNT  4 (Minimal assistance)   Comprehension Primary Mode of Comprehension: Auditory  Score: 4 Auditory  5   Expression Primary Mode of Expression: Verbal  Score: 4 Verbal  6   Social Interaction Score: 4 5   Problem Solving Score: 3 4   Memory Score: 3 5     FIM SCORES Initial Assessment Weekly Progress Assessment 5/17/2017   Bed/Chair/Wheelchair Transfers Transfer Type: Lateral with transfer board  Other: stand step txfr without AD/anterior approach  Transfer Assistance : 2 (Maximal assistance)  Sit to Stand Assistance: Maximum assistance (in p-bars, pull to stand)  Car Transfers: Not tested  Car Type: n/a     Bed Mobility Rolling Right 1 (Total assistance)   Rolling Left 1 (Total assistance)   Supine to Sit 1 (Total assistance)   Sit to Stand Maximum assistance (in p-bars, pull to stand)   Sit to Supine 1 (Total assistance)    Rolling Right   NT   Rolling Left   NT   Supine to Sit   NT   Sit to Stand       Sit to Supine   NT      Locomotion (W/C) Able to Propel (ft): 10 feet  Functional Level: 1  Curbs/Ramps Assist Required (FIM Score): 0 (Not tested)  Wheelchair Setup Assist Required : 2 (Maximal assistance)  Wheelchair Management: Other (comment) (needs help to manage brakes at this time) Function: min to mod assist with max encouragement  Setup Assistance: Max assist     Locomotion (W/C distance) 10 Feet 50 Feet   Locomotion (Walk) 1 (Dependent/total assistance) Minimal assistance   Locomotion (Walk dist.) 0 Feet (ft) 82 Feet (ft)   Steps/Stairs Steps/Stairs Ambulated (#): 0  Level of Assist : 0 (Not tested) NT         Nursing:     Neuro:   A&O x__4__                   Respiratory:   [x] WNL   [] O2   [] LPM ______   Other:  Peripheral Vascular:   [] TEDS present   [] Edema present ____ Grade   Cardiac:   [x] WNL   [] Other  Genitourinary:   [x] continent   [x] incontinent   [] rousseau  Abdominal _______ LBM  GI: ___diabetic____ Diet __thin____ Liquids _____ tube feeds  Musculoskeletal: __lateral with transfer board__ ROM Transfers _____ Assistive Device Used  __mod__ Level of Assistance  Skin Integumentary:   [] Intact   [x] Not Intact   __________Preventative Measures  Details______________________________________________________________  Pain: [x] Controlled   [] Not Controlled   Pain Meds:   [] Scheduled   [] PRN        Registered Dietitian / Nutrition:     Patient Vitals for the past 100 hrs:   % Diet Eaten   05/17/17 0923 100 %   05/16/17 1804 70 %   05/16/17 1309 100 %   05/16/17 0900 60 %   05/15/17 1800 75 %   05/15/17 1300 65 %   05/15/17 0930 50 %   05/14/17 1836 75 %   05/14/17 1332 75 %   05/14/17 0949 50 %   05/13/17 1901 50 %   05/13/17 1300 75 %   05/13/17 1000 50 %     Patient reported appetite and meal intake are good. Discussed food preferences. Pt also stated sometimes hungry after meals.  Discussed adding double vegetable BID and double meat once daily; pt agreed with plan    Supplements:          [] Yes   [x] No      Amount of supplement consumed:  not applicable       Intake/Output Summary (Last 24 hours) at 05/17/17 1256  Last data filed at 05/17/17 0923   Gross per 24 hour   Intake              938 ml   Output                0 ml   Net              938 ml Last bowel movement: 5/14      Interdisciplinary Team Goals:     1. Goal  Encourage pt to perform sit <> stand from w/c with minimal assistance and minimal reminders/verbal cues for efficient transfer. Barrier  Impaired strength, Impaired balance, Impaired transfers    Intervention  Strength and balance training, Transfer training     2. Goal  Encourage pt to perform UB bathing and dressing with minimal assistance. Barrier  Decreased strength, Decreased ROM, Increased pain    Intervention  ADL training, Therapeutic exercise, Therapeutic activity     3. Goal      Barrier      Intervention       4. Goal  Free from falls during rehab stay. Pain controlled <5 at rest.    Barrier  balance, increased pain    Intervention  bed/chair alarms, frequent rounding; pain meds as ordered     5. Goal Maintain mood stability during treatment effort on ARU    Barrier  History of mood disturbance    Intervention  Continue Rx and support /patient education     6. Goal  Po intake of meals will meet >75% of patient estimated nutritional needs within the next 7 days. Outcome:  [x] Met/Ongoing    []  Not Met    [] New/Initial Goal     Barrier  none known     Intervention  modified diet; update food preferences; add double vegetable BID and double meat once daily       Disposition / Discharge Planning: Follow-up therapy services:  tbd   DME recommendations:  tbd   Estimated discharge date:  5/31/17   Discharge Location:  home         Interdisciplinary team rounds were held this AM with the following team members:       Name   Physical Therapist    Ghanshyam Marie, PT, DPT   Occupational Therapist    Bashir [address]ales [address]   Speech Therapist       Recreational Therapist    Waleska Borges, [address] [place], [place]   Dietitian    Jc Arce RD   Clinical Psychologist    Navid Ambriz, Ph.D.   Physician    Harpreet Thomas MD       Zachary Stapleton MSW       Signed:  Chente Vasquez MD    May 17, 2017

## 2017-05-17 NOTE — PROGRESS NOTES
[facility] PHYSICAL REHABILITATION  [address] [place] [address], [address]     INPATIENT REHABILITATION  DAILY PROGRESS NOTE     Date: 5/17/2017    Name: Judit Duggan Age / Sex: 62 y.o. / female   CSN: [id number] MRN: [id number]   [facility] Date: 5/10/2017 Length of Stay: 7 days     Primary Rehab Diagnosis: Impaired Mobility and ADLs secondary to Systemic inflammatory response syndrome (SIRS) secondary to:  1. Cellulitis of the right forearm  2. Olecranon bursitis of the right elbow  3. Contusion of left elbow  4. Right Achilles tendonitis      Subjective:     Patient seen and examined. Blood pressure controlled. Blood sugars controlled but blood sugars in the low normal range in AM. Team conference was held at bedside this AM.      Patient's Complaint:   Uncontrolled pain   Urinary urge incontinence    Pain Control: increasing significant pain especially on the LUE and LLE      Objective:     Vital Signs:  Patient Vitals for the past 24 hrs:   BP Temp Pulse Resp SpO2   05/17/17 0754 98/68 98.7 °F (37.1 °C) 74 18 97 %   05/16/17 2039 125/73 98.9 °F (37.2 °C) 85 18 95 %   05/16/17 1605 112/72 99 °F (37.2 °C) 60 20 97 %        Physical Examination:  GENERAL SURVEY: Patient is awake, alert, oriented x 3, sitting comfortably on the chair, not in acute respiratory distress.   HEENT: pink palpebral conjunctivae, anicteric sclerae, no nasoaural discharge, moist oral mucosa  NECK: supple, no jugular venous distention, no palpable lymph nodes  CHEST/LUNGS: symmetrical chest expansion, good air entry, clear breath sounds  HEART: adynamic precordium, good S1 S2, no S3, regular rhythm, no murmurs  ABDOMEN: obese, bowel sounds appreciated, soft, non-tender  EXTREMITIES: (+) left hand wrapped in gauze, pink nailbeds, (+) warmth/erythema/swelling of both elbows, (+) bipedal edema, full and equal pulses, no calf tenderness   NEUROLOGICAL EXAM: The patient is awake, alert and oriented x3, able to answer questions fairly appropriately, able to follow 1 and 2 step commands. Able to tell time from the wall clock. Cranial nerves II-XII are grossly intact. No gross sensory deficit. Motor strength is 2+/5 on both shoulders, 3/5 on the right elbow, 3-/5 on the left elbow, 2+/5 on both wrists, 3+/5 on the right handgrip, 3/5 on the left handgrip, 3-/5 on the right hip, 3/5 on the left hip, 3-/5 on both knees and both ankles.       Current Medications:  Current Facility-Administered Medications   Medication Dose Route Frequency    famotidine (PEPCID) tablet 20 mg  20 mg Oral DAILY    gabapentin (NEURONTIN) capsule 300 mg  300 mg Oral Q12H    diphenhydrAMINE (BENADRYL) capsule 25 mg  25 mg Oral QHS    magnesium oxide (MAG-OX) tablet 400 mg  400 mg Oral BID    cholecalciferol (VITAMIN D3) tablet 1,000 Units  1,000 Units Oral DAILY    acetaminophen (TYLENOL) tablet 650 mg  650 mg Oral Q4H PRN    docusate sodium (COLACE) capsule 100 mg  100 mg Oral BID    bisacodyl (DULCOLAX) tablet 10 mg  10 mg Oral Q48H PRN    heparin (porcine) injection 5,000 Units  5,000 Units SubCUTAneous Q8H    insulin lispro (HUMALOG) injection   SubCUTAneous TIDAC    insulin glargine (LANTUS) injection 65 Units  65 Units SubCUTAneous QHS    albuterol (PROVENTIL VENTOLIN) nebulizer solution 2.5 mg  2.5 mg Nebulization Q4H PRN    divalproex DR (DEPAKOTE) tablet 250 mg  250 mg Oral QHS    ARIPiprazole (ABILIFY) tablet 10 mg  10 mg Oral DAILY    traZODone (DESYREL) tablet 100 mg  100 mg Oral QHS    rosuvastatin (CRESTOR) tablet 5 mg  5 mg Oral QHS    aspirin chewable tablet 81 mg  81 mg Oral DAILY WITH BREAKFAST    levothyroxine (SYNTHROID) tablet 100 mcg  100 mcg Oral ACB    sertraline (ZOLOFT) tablet 50 mg  50 mg Oral DAILY    HYDROcodone-acetaminophen (NORCO) 5-325 mg per tablet 1 Tab  1 Tab Oral Q4H PRN    umeclidinium-vilanterol (ANORO ELLIPTA) 62.5 mcg- 25 mcg/inhalation  1 Puff Inhalation QAM RT    B complex-vitaminC-folic acid (NEPHROCAP) cap  1 Cap Oral DAILY       Allergies:   Allergies   Allergen Reactions    Moxifloxacin Rash    Pcn [Penicillins] Swelling    Sulfa (Sulfonamide Antibiotics) Swelling       Functional Progress:    PHYSICAL THERAPY    ON ADMISSION MOST RECENT   Wheelchair Mobility/Management  Able to Propel (ft): 10 feet  Functional Level: 1  Curbs/Ramps Assist Required (FIM Score): 0 (Not tested)  Wheelchair Setup Assist Required : 2 (Maximal assistance)  Wheelchair Management: Other (comment) (needs help to manage brakes at this time) Wheelchair Mobility/Management  Able to Propel (ft): 100 feet (50 ft x 2 reps with 2 turns )  Functional Level: 2  Curbs/Ramps Assist Required (FIM Score): 1 (Total assistance)  Wheelchair Setup Assist Required : 3 (Moderate assistance)  Wheelchair Management: Manages left brake, Manages right brake (with brake extension)     Gait  Amount of Assistance: 1 (Dependent/total assistance)  Distance (ft): 0 Feet (ft)  Assistive Device: Other (comment) (attempted in p-bars, but unable to advance LE's) Gait  Amount of Assistance: 4 (Minimal assistance)  Distance (ft): 40 Feet (ft)  Assistive Device: Walker, rolling, Gait belt (w/c follow, O2 donned)     Balance-Sitting/Standing  Sitting - Static: Good (unsupported)  Sitting - Dynamic: Fair (occasional)  Standing - Static: Poor  Standing - Dynamic : Impaired Balance-Sitting/Standing  Sitting - Static: Good (unsupported)  Sitting - Dynamic: Good (unsupported)  Standing - Static: Poor  Standing - Dynamic : Impaired     Bed/Mat Mobility  Rolling Right : 1 (Total assistance)  Rolling Left : 1 (Total assistance)  Supine to Sit : 1 (Total assistance)  Sit to Supine : 1 (Total assistance) Bed/Mat Mobility  Rolling Right : 3 (Moderate assistance )  Rolling Left : 3 (Moderate assistance )  Supine to Sit : 4 (Minimal assistance)  Sit to Supine : 4 (Minimal assistance)     Transfers  Transfer Type: Lateral with transfer board  Other: stand step txfr without AD/anterior approach  Transfer Assistance : 2 (Maximal assistance)  Sit to Stand Assistance: Maximum assistance (in p-bars, pull to stand)  Car Transfers: Not tested  Car Type: n/a Transfers  Transfer Type: SPT with walker  Other: Stand Step with RW  Transfer Assistance : 3 (Moderate assistance )  Sit to Stand Assistance: Moderate assistance  Car Transfers: Not tested  Car Type: n/a     Steps or Stairs  Steps/Stairs Ambulated (#): 0  Level of Assist : 0 (Not tested) Steps or Stairs  Steps/Stairs Ambulated (#): 0  Level of Assist : 0 (Not tested)         Lab/Data Review:  Recent Results (from the past 24 hour(s))   GLUCOSE, POC    Collection Time: 05/16/17 12:10 PM   Result Value Ref Range    Glucose (POC) 117 (H) 70 - 110 mg/dL   GLUCOSE, POC    Collection Time: 05/16/17  4:49 PM   Result Value Ref Range    Glucose (POC) 165 (H) 70 - 110 mg/dL   GLUCOSE, POC    Collection Time: 05/16/17  9:04 PM   Result Value Ref Range    Glucose (POC) 161 (H) 70 - 110 mg/dL   GLUCOSE, POC    Collection Time: 05/17/17  8:05 AM   Result Value Ref Range    Glucose (POC) 82 70 - 110 mg/dL       Estimated Glomerular Filtration Rate:  CKD-EPI:   On admission, estimated GFR was 53.6 mL/min/1.73m2 based on a Creatinine of 1.26 mg/dl. Most recent estimated GFR was 36.8 mL/min/1.73m2 based on a Creatinine of 1.72 mg/dl. MDRD:   On admission, estimated GFR was 55.7 mL/min/1.73m2 based on a Creatinine of 1.26 mg/dl. Most recent estimated GFR was 38.9 mL/min/1.73m2 based on a Creatinine of 1.72 mg/dl. Assessment:     Primary Rehabilitation Diagnosis  1. Impaired Mobility and ADLs  2. Systemic inflammatory response syndrome (SIRS) secondary to:   a. Cellulitis of the right forearm   b. Olecranon bursitis of the right elbow   c. Contusion of left elbow   d.  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 heart failure    Depression    History of back injury    Anxiety    Wears glasses    History of hepatitis C    Sickle cell trait (HCC)    History of acute renal failure    Hypothyroidism    Recurrent genital herpes    Sarcoidosis ([facility] [address]    History pf abscess of right arm    Hypoxemia requiring supplemental oxygen    Abnormal nuclear stress test    History of cellulitis and abscess of hand    History of cellulitis and abscess of foot    Intravenous drug user    History of penicillin allergy    Falls frequently    Gastroesophageal reflux disease with hiatal hernia    Memory difficulty    Mixed connective tissue disease     CKD stage G3a/A1, GFR 45-59 and albumin creatinine ratio <30 mg/g     Acute renal failure superimposed on stage 3 chronic kidney disease       Plan:     1. Justification for continued stay: Good progression towards established rehabilitation goals. 2. Medical Issues being followed closely:    [x]  Fall and safety precautions     [x]  Wound Care     [x]  Bowel and Bladder Function     [x]  Fluid Electrolyte and Nutrition Balance     [x]  Pain Control      3. Issues that 24 hour rehabilitation nursing is following:    [x]  Fall and safety precautions     [x]  Wound Care     [x]  Bowel and Bladder Function     [x]  Fluid Electrolyte and Nutrition Balance     [x]  Pain Control      [x]  Assistance with and education on in-room safety with transfers to and from the bed, wheelchair, toilet and shower.       4. Acute rehabilitation plan of care: [x]  Continue current care and rehab. [x]  Physical Therapy           [x]  Occupational Therapy           []  Speech Therapy     []  Hold Rehab until further notice     5. Medications:    [x]  MAR Reviewed     [x]  Continue Present Medications     6. DVT Prophylaxis:      []  Lovenox     [x]  Unfractionated Heparin     []  Coumadin     []  NOAC     [x]  ROBERT Stockings     []  Sequential Compression Device     []  None     7.  Orders:   > Systemic inflammatory response syndrome (SIRS) secondary to Cellulitis of the right forearm, Olecranon bursitis of the right elbow, Contusion of left elbow and Right Achilles tendonitis    > Patient had completed the recommended treatment course of oral Clindamycin on 5/13/2017     > Abnormally low HDL with hypertriglyceridemia   > Continue Rosuvastatin 5 mg PO q HS     > 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 congestive heart failure    > On 5/11/2017, discontinued Furosemide 20 mg PO once daily (re: rising Creatinine)     > Bipolar affective disease   > Continue:    > Aripiprazole 10 mg PO once daily    > Divalproex  mg PO q HS     > Chronic obstructive pulmonary disease   > Continue:    > Anoro Ellipta 1 puff inhaled once daily    > Albuterol nebulization q 4 hr PRN for shortness of breath     > Depression / Anxiety   > Continue:    > Aripiprazole 10 mg PO once daily    > Sertraline 50 mg PO once daily    > Trazodone 100 mg PO q HS     > Diabetic neuropathy   > On 5/15/2017, decreased Gabapentin from 300 mg PO q 8 hr to 300 mg PO q 12 hr (re: decrease in CrCl)   > Continue Gabapentin 300 mg PO q 12 hr     > Diastolic dysfunction without heart failure   > On 5/11/2017, discontinued Furosemide 20 mg PO once daily (re: rising Creatinine)   > On 5/14/2017, resumed Eplerenone 25 mg PO once daily    > On 5/15/2017, discontinued Eplerenone 25 mg PO once daily (re: decrease in CrCl)     > Gastroesophageal reflux disease with hiatal hernia   > On 5/15/2017, decreased Famotidine from 20 mg PO BID to 20 mg PO once daily   > Continue Famotidine 20 mg PO once daily     > Hypothyroidism   > Continue Levothyroxine 100 mcg PO once daily before breakfast     > Hypoxemia requiring supplemental oxygen   > Continue O2 inhalation 2 LPM via nasal cannula continuous     > Type 2 diabetes mellitus with diabetic neuropathy and stage 3 chronic kidney disease    > Prior to admission to [facility], the patient claimed she wa snot using Lantus at home; instead, she was using Insulin U500 on a sliding scale basis   > Continue:    > Change Lantus from 65 units SC q HS to 40 units SC q AM and 20 units SC q PM    > Humalog insulin sliding scale SC TID AC only     > Mg (5/11/2017, on admission) = 1.6   > Patient was started on Mg(OH)2 400 mg PO BID   > Continue Mg(OH)2 400 mg PO BID    > CKD stage G3a/A1, GFR 45-59 and albumin creatinine ratio <30 mg/g     > On admission, based on a Creatinine of 1.26 mg/dl:    Estimated GFR using CKD-EPI = 53.6 mL/min/1.73m2    Estimated GFR using MDRD = 55.7 mL/min/1.73m2    > Urine microalbumin/Creatinine ratio (5/11/2017) = 9 mg/g    > Acute renal failure superimposed on stage 3 chronic kidney disease   > BUN/Creatinine (5/10/2017) = 19/1.33    > BUN/Creatinine (5/11/2017, on admission) = 16/1.26    > On 5/12/2017:    > Discontinued Diclofenac 2 grams applied to affected areas 4 times daily     > Started Naproxen 375 mg PO BID with meals   > On 5/14/2017, resumed Eplerenone 25 mg PO once daily    > BUN/Creatinine (5/15/2017) = 22/1.87    > Urinalysis (5/15/2017) showed SG 1.014, no casts   > On 5/15/2017:    > Discontinued Naproxen 375 mg PO BID with meals    > Discontinued Eplerenone 25 mg PO once daily     > Decreased Gabapentin from 300 mg PO q 8 hr to 300 mg PO q 12 hr     > Decreased Famotidine from 20 mg PO BID to 20 mg PO once daily    > Patient was given IVF: 0.9%  ml/hr x 1 liter   > On 5/16/2017, patient was given IVF: 0.9%  ml/hr x 1 liter   > Increase oral fluid intake   > Continue IVF: 0.9%  ml/hr x 1 liter    > Difficulty sleeping    > Continue:    > Trazodone 100 mg PO q HS    > Diphenhydramine HCl 25 mg PO q HS    > Urinary urge incontinence   > Add Tolterodine 1 mg PO BID   > Continue Diphenhydramine HCl 25 mg PO q HS (re: ADR is urinary retention)    > Analgesia   > On 5/12/2017:    > Discontinued Diclofenac 2 grams applied to affected areas 4 times daily     > Started Naproxen 375 mg PO BID with meals   > On 5/15/2017, discontinued Naproxen 375 mg PO BID with meals (re: decrease in CrCl)   > Discontinue Norco 5/325 1 tab PO q 4 hr PRN for pain level greater than 4/10   > Percocet 5/325 1 tab PO TID (8AM, 12PM, 4PM)   > Percocet 5/325 1 tab PO q 4 hr PRN for pain level greater than 4/10 (from 8PM to 4AM only)   > Continue Acetaminophen 650 mg PO q 4 hr PRN for pain level less than 5/10          8. Patient's progress in rehabilitation and medical issues discussed with the patient. All questions answered to the best of my ability. Care plan discussed with patient and nurse.       Jyoti Marcelo MD    May 17, 2017

## 2017-05-17 NOTE — REHAB NOTE
SHIFT CHANGE NOTE FOR [facility]  Bedside and Verbal shift change report given to Kirsty Urbina RN (oncoming nurse) by Benito Navas RN   (offgoing nurse). Report included the following information SBAR, Kardex, MAR and Recent Results.     Situation:   Code Status: Full Code   Reason for Admission: [facility] Day: 6   Problem List:   Hospital Problems  Date Reviewed: 5/16/2017          Codes Class Noted POA    Acute renal failure superimposed on stage 3 chronic kidney disease ([facility] ICD-10-CM: N17.9, N18.3  ICD-9-CM: 584.9, 585.3  5/15/2017 No        Cellulitis of right forearm ICD-10-CM: L03.113  ICD-9-CM: 682.3  5/4/2017 Yes        Olecranon bursitis of right elbow ICD-10-CM: M70.21  ICD-9-CM: 726.33  5/4/2017 Yes        Contusion of left elbow ICD-10-CM: S50.02XA  ICD-9-CM: 923.11  5/4/2017 Yes        Impaired mobility and ADLs ICD-10-CM: Z74.09  ICD-9-CM: 799.89  5/4/2017 Yes        * (Principal)SIRS (systemic inflammatory response syndrome) ([facility] ICD-10-CM: R65.10  ICD-9-CM: 995.90  5/2/2017 Yes        Right Achilles tendinitis ICD-10-CM: M76.61  ICD-9-CM: 726.71  5/2/2017 Yes        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 congestive heart failure (Chronic) ICD-10-CM: I13.10, N18.3  ICD-9-CM: 404.10, 585.3  Unknown Yes    Overview Signed 4/23/2013 10:02 AM by Osvaldo Beckett     Better controlled             Type 2 diabetes with stage 3 chronic kidney disease GFR 30-59 ([facility] (Chronic) ICD-10-CM: E11.22, N18.3  ICD-9-CM: 250.40, 585.3  Unknown Yes              Background:   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 congestive heart failure     Better controlled     Bipolar affective disorder ([facility] [address] 12/5/2012    Cellulitis of right forearm 5/4/2017    Chronic back pain     Chronic obstructive pulmonary disease (COPD) (HCC)     SOB, on paula O2 Recent admission with psychosis     CKD stage G3a/A1, GFR 45-59 and albumin creatinine ratio <30 mg/g 5/11/2017    Urine microalbumin/Creatinine ratio (5/11/2017) = 9 mg/g    Contusion of left elbow 5/4/2017    Depression     Diabetic neuropathy associated with type 2 diabetes mellitus (HCC)     Diastolic dysfunction without heart failure     Stable on diuretics     Falls frequently     Gastroesophageal reflux disease with hiatal hernia     Generalized osteoarthritis of multiple sites     History of acute renal failure 5/31/2013    History of back injury     jumped out of second story window     History of hepatitis C     treated    History of penicillin allergy     Hypothyroidism     Hypoxemia requiring supplemental oxygen 12/29/2014    Intravenous drug user 5/2/2017    Memory difficulty     Mixed connective tissue disease ([facility] [address] 5/10/2017    Obesity, Class I, BMI 30-34.9     Olecranon bursitis of right elbow 5/4/2017    Recurrent genital herpes 5/31/2013    Right Achilles tendinitis 5/2/2017    Sarcoidosis ([facility] [address]     Sickle cell trait ([facility] [address]     Type 2 diabetes with stage 3 chronic kidney disease GFR 30-59 ([facility] [address]     Venous insufficiency     Wears glasses       Patient taking anticoagulants yes    Patient has a defibrillator: no     Assessment:   Changes in Assessment throughout shift: no     Patient has central line: no Reasons if yes: Insertion date: Last dressing date:   Patient has Renae Cath: no Reasons if yes:     Insertion date:     Last Vitals:     Vitals:    05/15/17 1610 05/15/17 2129 05/16/17 0711 05/16/17 1605   BP: 115/67 132/76 123/73 112/72   Pulse: 68 66 61 60   Resp: 20 18 18 20   Temp: 98.2 °F (36.8 °C) 96.5 °F (35.8 °C) 97.3 °F (36.3 °C) 99 °F (37.2 °C)   SpO2: 98% 100% 97% 97%   Weight:       Height: LMP: 11/25/2012        PAIN    Pain Assessment    Pain Intensity 1: 8 (05/16/17 1742) Pain Intensity 1: 2 (12/29/14 1105)    Pain Location 1:  (all over) Pain Location 1: Abdomen    Pain Intervention(s) 1: Medication (see MAR) Pain Intervention(s) 1: Medication (see MAR)  Patient Stated Pain Goal: 0 Patient Stated Pain Goal: 0  o Intervention effective: yes   o Other actions taken for pain: repositioning     Skin Assessment  Skin color Skin Color: Appropriate for ethnicity  Condition/Temperature Skin Condition/Temp: Dry, Warm  Integrity Skin Integrity: Wound (add Wound LDA)  Turgor Turgor: Non-tenting  Weekly Pressure Ulcer Documentation  Pressure  Injury Documentation: No Pressure Injury Noted-Pressure Ulcer Prevention Initiated  Wound Prevention & Protection Methods  Orientation of wound Orientation of Wound Prevention: Posterior  Location of Prevention Location of Wound Prevention: Sacrum/Coccyx  Dressing Present Dressing Present : No  Dressing Status    Wound Offloading Wound Offloading (Prevention Methods): Bed, pressure reduction mattress, Chair cushion, Repositioning, Wheelchair     INTAKE/OUPUT    Date 05/15/17 1900 - 05/16/17 0659 05/16/17 0700 - 05/17/17 0659   Shift 2944-9885 24 Hour Total 2583-9132 7718-1950 24 Hour Total   I  N  T  A  K  E   P.O.  620 878  878      P. O.  620 878  878    Shift Total  (mL/kg)  620  (6.5) 878  (9.2)  878  (9.2)   O  U  T  P  U  T   Urine  (mL/kg/hr)           Urine Occurrence(s) 4 x 8 x 2 x  2 x    Stool           Stool Occurrence(s) 0 x 0 x 0 x  0 x    Shift Total  (mL/kg)        NET  620 878  878   Weight (kg) 95.3 95.3 95.3 95.3 95.3       Recommendations:  1. Patient needs and requests: none    2. Diet: diabetic    3. Pending tests/procedures: am labs     4. Functional Level/Equipment: assist x 2/wheelchair    5.  Estimated Discharge Date: tbd Posted on Whiteboard in Patients Room: no     HEALS Safety Check    A safety check occurred in the patient's room between off going nurse and oncoming nurse listed above. The safety check included the below items  Area Items   H  High Alert Medications - Verify all high alert medication drips (heparin, PCA, etc.)   E  Equipment - Suction is set up for ALL patients (with lashay)  - Red plugs utilized for all equipment (IV pumps, etc.)  - WOWs wiped down at end of shift.  - Room stocked with oxygen, suction, and other unit-specific supplies   A  Alarms - Bed alarm is set for fall risk patients  - Ensure chair alarm is in place and activated if patient is up in a chair   L  Lines - Check IV for any infiltration  - Renae bag is empty if patient has a Renae   - Tubing and IV bags are labeled   S  Safety   - Room is clean, patient is clean, and equipment is clean. - Hallways are clear from equipment besides carts. - Fall bracelet on for fall risk patients  - Ensure room is clear and free of clutter  - Suction is set up for ALL patients (with lashay)  - Hallways are clear from equipment besides carts.    - Isolation precautions followed, supplies available outside room, sign posted   Raymon Muhammad RN

## 2017-05-17 NOTE — PROGRESS NOTES
conducted a Follow up consultation and Spiritual Assessment for Stephen Sinha, who is a 62 y.o.,female. The  provided the following Interventions:  Continued the relationship of care and support. Listened empathically as patient shared. Offered prayer and assurance of continued prayer on patients behalf. Chart reviewed. The following outcomes were achieved:  Patient thankful for the prayer. Patient continuing to push herself to work hard towards recovery. Patient expressed gratitude for 's prayer. Plan:  Chaplains will continue to follow and will provide pastoral care on an as needed/requested basis.  recommends bedside caregivers page  on duty if patient shows signs of acute spiritual or emotional distress.        Karmen Sepulveda  [address] [facility]  [phone]

## 2017-05-17 NOTE — PROGRESS NOTES
Problem: Mobility Impaired (Adult and Pediatric)  Goal: *Acute Goals and Plan of Care (Insert Text)  Physical Therapy Goals  STGs  Initiated 5/11/2017 and to be accomplished within 7 day(s) [5/18/17]  1. Patient will roll side to side in bed with moderate assistance. 2. Patient will perform supine to sit and sit to supine with moderate assistance. 3. Patient will transfer from bed to chair and chair to bed with moderate assistance using the least restrictive device. 4. Patient will perform sit to stand with moderate assistance . 5. Patient will propel w/c on level surface for 50 ft with R UE and B LEs with min A. LTGs  Initiated 5/11/2017 and to be accomplished within 4 weeks [6/8/17]  1. Patient will roll side to side in bed with contact guard assist.   2. Patient will perform supine to sit and sit to supine in bed with contact guard assistance. 3. Patient will transfer from bed to chair and chair to bed with contact guard assistance using the least restrictive device. 4. Patient will perform sit to stand with contact guard assist.  5. Patient will ambulate with minimal assistance/contact guard assist for 50 feet with the least restrictive device. 5. Patient will ascend/descend 3 stairs with bilateral handrail(s) with moderate assistance. PHYSICAL THERAPY DAILY NOTE  Patient Name:Stephen Sinha  Time In: 1330  Time Out: 1500  Patient Seen For: Balance activities;Gait training;Patient education;Transfer training; Therapeutic exercise  Diagnosis: Right forearm cellulits  SIRS (systemic inflammatory response syndrome) (HCC) SIRS (systemic inflammatory response syndrome) (HCC)  Precautions: Fall Risk Precautions, 2LPMO2 via nasal cannula     Subjective:  Pt initially tells PT she can not participate in hygiene with toileting task 2/2 L hand pain. However, with max encouragement and conversation re: goals and problem solving, pt was able to complete tasks utilizing R UE. Pain at start of tx:  Pt reports 8/10 pain in B UEs and B LEs  Pain at stop of tx: 8/10 B LEs and B UEs     Patient identified with name and : yes         Objective:         TRANSFERS Daily Assessment     Other: stand step with RW  Transfer Assistance : 4 (Minimal assistance)  Sit to Stand Assistance: Minimal assistance  Pt requires min assist initially for sit to stand from w/c and CGA for balance with stand step transfer from w/c to mat table to the R as well as CGA for slow controlled descent with stand to sit from RW to mat table. Pt performed 2 sets of 5 repetitions of sit <> stand from 22\" mat table with close supervision and intermittent verbal cuing for efficient movement patterns. Pt requires minimal assistance for sit <> stand from w/c (21\") height at start of treatment session and at end of treatment session with pt requiring max verbal and manual cues at end of treatment for ant and up weight shift with sit to stand. Pt requires CGA safety and balance for w/c <> commode transfer with RW. GAIT Daily Assessment     Amount of Assistance: 4 (Contact guard assistance)  Distance: 52 Feet (ft)  Assistive Device: Walker, rolling   Pt requires CGA for safety and balance and ambulates with fwd flexed/rounded shoulder posture with excessive UE weight bearing. Pt reports fatigue at end of treatment session and does not follow verbal cues for decreased B UE weight bearing and more neutral spinal posture. STEPS or STAIRS Daily Assessment     Steps/Stairs Ambulated (#): 2  Level of Assist : 4 (Minimal assistance)  Rail Use: Both   Pt requires CGA to min assist for balance, weight shifting and safety to negotiate stairs with step to step pattern utilizing B handrails.           BALANCE Daily Assessment     Sitting - Static: Good (unsupported)  Sitting - Dynamic: Good (unsupported)  Standing - Static: Fair  Standing - Dynamic : Impaired   Pt was able to participate in toileting/hygeine task with pt standing without UE support x 3 minutes to participate in hygiene/dressing with max encouragement to attempt to do as much as she can prior to seeking assistance. WHEELCHAIR MOBILITY Daily Assessment     Able to Propel (ft): 150 feet  Functional Level: 4  Wheelchair Setup Assist Required : 4 (Minimal assistance)  Wheelchair Management: Manages left brake;Manages right brake   Pt utilizes B UEs intermittently and B LEs intermittently to propel w/c requiring min assist intermittently to attend to path negotiation/safety with negotiating obstacles with pt otherwise able to propel w/c at supervision level. Assessment: Pt is progressing with functional mobility requiring decreased assistance with transfers from mod to min assist/CGA. However, pt continues to require max encouragement for full effort and continues to initiate requesting assistance prior to attempting mobility tasks. Plan of Care: Continue with current POC. Progress stair training and functional strengthening to promote increased safety and independence with mobility.      Ania Meredith, PT, DPT  5/17/2017

## 2017-05-17 NOTE — REHAB NOTE
SHIFT CHANGE NOTE FOR [facility]  Bedside and Verbal shift change report given to Jerodorian Hawley (oncoming nurse) by Bg Moreno RN   (offgoing nurse). Report included the following information SBAR, Kardex, MAR and Recent Results.     Situation:   Code Status: Full Code   Reason for Admission: [facility] Day: 7   Problem List:   Hospital Problems  Date Reviewed: 5/16/2017          Codes Class Noted POA    Acute renal failure superimposed on stage 3 chronic kidney disease [facility] ICD-10-CM: N17.9, N18.3  ICD-9-CM: 584.9, 585.3  5/15/2017 No        Cellulitis of right forearm ICD-10-CM: L03.113  ICD-9-CM: 682.3  5/4/2017 Yes        Olecranon bursitis of right elbow ICD-10-CM: M70.21  ICD-9-CM: 726.33  5/4/2017 Yes        Contusion of left elbow ICD-10-CM: S50.02XA  ICD-9-CM: 923.11  5/4/2017 Yes        Impaired mobility and ADLs ICD-10-CM: Z74.09  ICD-9-CM: 799.89  5/4/2017 Yes        * (Principal)SIRS (systemic inflammatory response syndrome) ([facility] ICD-10-CM: R65.10  ICD-9-CM: 995.90  5/2/2017 Yes        Right Achilles tendinitis ICD-10-CM: M76.61  ICD-9-CM: 726.71  5/2/2017 Yes        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 congestive heart failure (Chronic) ICD-10-CM: I13.10, N18.3  ICD-9-CM: 404.10, 585.3  Unknown Yes    Overview Signed 4/23/2013 10:02 AM by Gregory Anderson     Better controlled             Type 2 diabetes with stage 3 chronic kidney disease GFR 30-59 ([facility] (Chronic) ICD-10-CM: E11.22, N18.3  ICD-9-CM: 250.40, 585.3  Unknown Yes              Background:   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 congestive heart failure     Better controlled     Bipolar affective disorder ([facility] 12/5/2012    Cellulitis of right forearm 5/4/2017    Chronic back pain     Chronic obstructive pulmonary disease (COPD) (HCC)     SOB, on paula O2 Recent admission with psychosis     CKD stage G3a/A1, GFR 45-59 and albumin creatinine ratio <30 mg/g 5/11/2017    Urine microalbumin/Creatinine ratio (5/11/2017) = 9 mg/g    Contusion of left elbow 5/4/2017    Depression     Diabetic neuropathy associated with type 2 diabetes mellitus (HCC)     Diastolic dysfunction without heart failure     Stable on diuretics     Falls frequently     Gastroesophageal reflux disease with hiatal hernia     Generalized osteoarthritis of multiple sites     History of acute renal failure 5/31/2013    History of back injury     jumped out of second story window     History of hepatitis C     treated    History of penicillin allergy     Hypothyroidism     Hypoxemia requiring supplemental oxygen 12/29/2014    Intravenous drug user 5/2/2017    Memory difficulty     Mixed connective tissue disease ([facility] 5/10/2017    Obesity, Class I, BMI 30-34.9     Olecranon bursitis of right elbow 5/4/2017    Recurrent genital herpes 5/31/2013    Right Achilles tendinitis 5/2/2017    Sarcoidosis ([facility]     Sickle cell trait ([facility]     Type 2 diabetes with stage 3 chronic kidney disease GFR 30-59 ([facility]     Venous insufficiency     Wears glasses       Patient taking anticoagulants yes    Patient has a defibrillator: no     Assessment:   Changes in Assessment throughout shift: no     Patient has central line: no Reasons if yes: Insertion date: Last dressing date:   Patient has Renae Cath: no Reasons if yes:     Insertion date:     Last Vitals:     Vitals:    05/15/17 2129 05/16/17 0711 05/16/17 1605 05/16/17 2039   BP: 132/76 123/73 112/72 125/73   Pulse: 66 61 60 85   Resp: 18 18 20 18   Temp: 96.5 °F (35.8 °C) 97.3 °F (36.3 °C) 99 °F (37.2 °C) 98.9 °F (37.2 °C)   SpO2: 100% 97% 97% 95%   Weight:       Height: LMP: 11/25/2012        PAIN    Pain Assessment    Pain Intensity 1: 0 (05/17/17 0200) Pain Intensity 1: 2 (12/29/14 1105)    Pain Location 1: Generalized Pain Location 1: Abdomen    Pain Intervention(s) 1: Medication (see MAR) Pain Intervention(s) 1: Medication (see MAR)  Patient Stated Pain Goal: 0 Patient Stated Pain Goal: 0  o Intervention effective: yes   o Other actions taken for pain: repositioning     Skin Assessment  Skin color Skin Color: Appropriate for ethnicity  Condition/Temperature Skin Condition/Temp: Dry, Warm  Integrity Skin Integrity: Wound (add Wound LDA)  Turgor Turgor: Non-tenting  Weekly Pressure Ulcer Documentation  Pressure  Injury Documentation: No Pressure Injury Noted-Pressure Ulcer Prevention Initiated  Wound Prevention & Protection Methods  Orientation of wound Orientation of Wound Prevention: Posterior  Location of Prevention Location of Wound Prevention: Sacrum/Coccyx  Dressing Present Dressing Present : No  Dressing Status    Wound Offloading Wound Offloading (Prevention Methods): Bed, pressure redistribution/air     INTAKE/OUPUT    Date 05/16/17 0700 - 05/17/17 0659 05/17/17 0700 - 05/18/17 0659   Shift 5708-1827 4592-9374 24 Hour Total 7130-6178 6729-7876 24 Hour Total   I  N  T  A  K  E   P.O. 878  878         P. O. 878  878       Shift Total  (mL/kg) 878  (9.2)  878  (9.2)      O  U  T  P  U  T   Urine  (mL/kg/hr)            Urine Occurrence(s) 2 x 2 x 4 x       Stool            Stool Occurrence(s) 0 x 0 x 0 x       Shift Total  (mL/kg)           878      Weight (kg) 95.3 95.3 95.3 95.3 95.3 95.3       Recommendations:  1. Patient needs and requests: none    2. Diet: diabetic    3. Pending tests/procedures: am labs     4. Functional Level/Equipment: assist x 2/wheelchair    5.  Estimated Discharge Date: tbd Posted on Whiteboard in Patients Room: no     HEALS Safety Check    A safety check occurred in the patient's room between off going nurse and oncoming nurse listed above.    The safety check included the below items  Area Items   H  High Alert Medications - Verify all high alert medication drips (heparin, PCA, etc.)   E  Equipment - Suction is set up for ALL patients (with lashay)  - Red plugs utilized for all equipment (IV pumps, etc.)  - WOWs wiped down at end of shift.  - Room stocked with oxygen, suction, and other unit-specific supplies   A  Alarms - Bed alarm is set for fall risk patients  - Ensure chair alarm is in place and activated if patient is up in a chair   L  Lines - Check IV for any infiltration  - Renae bag is empty if patient has a Renae   - Tubing and IV bags are labeled   S  Safety   - Room is clean, patient is clean, and equipment is clean. - Hallways are clear from equipment besides carts. - Fall bracelet on for fall risk patients  - Ensure room is clear and free of clutter  - Suction is set up for ALL patients (with lashay)  - Hallways are clear from equipment besides carts.    - Isolation precautions followed, supplies available outside room, sign posted   Carrie Majano RN

## 2017-05-18 LAB
ANION GAP BLD CALC-SCNC: 6 MMOL/L (ref 3–18)
BUN SERPL-MCNC: 15 MG/DL (ref 7–18)
BUN/CREAT SERPL: 9 (ref 12–20)
CALCIUM SERPL-MCNC: 10 MG/DL (ref 8.5–10.1)
CHLORIDE SERPL-SCNC: 103 MMOL/L (ref 100–108)
CO2 SERPL-SCNC: 32 MMOL/L (ref 21–32)
CREAT SERPL-MCNC: 1.6 MG/DL (ref 0.6–1.3)
GLUCOSE BLD STRIP.AUTO-MCNC: 107 MG/DL (ref 70–110)
GLUCOSE BLD STRIP.AUTO-MCNC: 158 MG/DL (ref 70–110)
GLUCOSE BLD STRIP.AUTO-MCNC: 184 MG/DL (ref 70–110)
GLUCOSE BLD STRIP.AUTO-MCNC: 210 MG/DL (ref 70–110)
GLUCOSE SERPL-MCNC: 140 MG/DL (ref 74–99)
HCT VFR BLD AUTO: 30.7 % (ref 35–45)
HGB BLD-MCNC: 9.8 G/DL (ref 12–16)
PLATELET # BLD AUTO: 213 K/UL (ref 135–420)
POTASSIUM SERPL-SCNC: 5.2 MMOL/L (ref 3.5–5.5)
SODIUM SERPL-SCNC: 141 MMOL/L (ref 136–145)

## 2017-05-18 PROCEDURE — 97530 THERAPEUTIC ACTIVITIES: CPT

## 2017-05-18 PROCEDURE — 97110 THERAPEUTIC EXERCISES: CPT

## 2017-05-18 PROCEDURE — 97542 WHEELCHAIR MNGMENT TRAINING: CPT

## 2017-05-18 PROCEDURE — 74011250636 HC RX REV CODE- 250/636: Performed by: INTERNAL MEDICINE

## 2017-05-18 PROCEDURE — 74011250637 HC RX REV CODE- 250/637: Performed by: INTERNAL MEDICINE

## 2017-05-18 PROCEDURE — 65310000000 HC RM PRIVATE REHAB

## 2017-05-18 PROCEDURE — 85018 HEMOGLOBIN: CPT | Performed by: INTERNAL MEDICINE

## 2017-05-18 PROCEDURE — 82962 GLUCOSE BLOOD TEST: CPT

## 2017-05-18 PROCEDURE — 74011636637 HC RX REV CODE- 636/637: Performed by: INTERNAL MEDICINE

## 2017-05-18 PROCEDURE — 36415 COLL VENOUS BLD VENIPUNCTURE: CPT | Performed by: INTERNAL MEDICINE

## 2017-05-18 PROCEDURE — 97116 GAIT TRAINING THERAPY: CPT

## 2017-05-18 PROCEDURE — 80048 BASIC METABOLIC PNL TOTAL CA: CPT | Performed by: INTERNAL MEDICINE

## 2017-05-18 PROCEDURE — 85049 AUTOMATED PLATELET COUNT: CPT | Performed by: INTERNAL MEDICINE

## 2017-05-18 RX ORDER — SODIUM CHLORIDE 9 MG/ML
1000 INJECTION, SOLUTION INTRAVENOUS ONCE
Status: COMPLETED | OUTPATIENT
Start: 2017-05-18 | End: 2017-05-19

## 2017-05-18 RX ADMIN — DOCUSATE SODIUM 100 MG: 100 CAPSULE, LIQUID FILLED ORAL at 17:37

## 2017-05-18 RX ADMIN — CHOLECALCIFEROL TAB 25 MCG (1000 UNIT) 1000 UNITS: 25 TAB at 08:33

## 2017-05-18 RX ADMIN — SERTRALINE HYDROCHLORIDE 50 MG: 50 TABLET ORAL at 08:33

## 2017-05-18 RX ADMIN — ASPIRIN 81 MG CHEWABLE TABLET 81 MG: 81 TABLET CHEWABLE at 08:34

## 2017-05-18 RX ADMIN — FAMOTIDINE 20 MG: 20 TABLET ORAL at 08:33

## 2017-05-18 RX ADMIN — OXYCODONE HYDROCHLORIDE AND ACETAMINOPHEN 1 TABLET: 5; 325 TABLET ORAL at 12:55

## 2017-05-18 RX ADMIN — TOLTERODINE TARTRATE 1 MG: 1 TABLET, FILM COATED ORAL at 08:32

## 2017-05-18 RX ADMIN — GABAPENTIN 300 MG: 300 CAPSULE ORAL at 05:46

## 2017-05-18 RX ADMIN — OXYCODONE HYDROCHLORIDE AND ACETAMINOPHEN 1 TABLET: 5; 325 TABLET ORAL at 16:25

## 2017-05-18 RX ADMIN — NEPHROCAP 1 CAPSULE: 1 CAP ORAL at 08:33

## 2017-05-18 RX ADMIN — INSULIN LISPRO 4 UNITS: 100 INJECTION, SOLUTION INTRAVENOUS; SUBCUTANEOUS at 12:55

## 2017-05-18 RX ADMIN — DOCUSATE SODIUM 100 MG: 100 CAPSULE, LIQUID FILLED ORAL at 08:33

## 2017-05-18 RX ADMIN — Medication 400 MG: at 08:33

## 2017-05-18 RX ADMIN — TRAZODONE HYDROCHLORIDE 100 MG: 50 TABLET ORAL at 20:39

## 2017-05-18 RX ADMIN — GABAPENTIN 300 MG: 300 CAPSULE ORAL at 17:37

## 2017-05-18 RX ADMIN — INSULIN GLARGINE 20 UNITS: 100 INJECTION, SOLUTION SUBCUTANEOUS at 20:40

## 2017-05-18 RX ADMIN — HEPARIN SODIUM 5000 UNITS: 5000 INJECTION, SOLUTION INTRAVENOUS; SUBCUTANEOUS at 05:46

## 2017-05-18 RX ADMIN — ROSUVASTATIN CALCIUM 5 MG: 5 TABLET ORAL at 20:39

## 2017-05-18 RX ADMIN — DIVALPROEX SODIUM 250 MG: 250 TABLET, DELAYED RELEASE ORAL at 20:39

## 2017-05-18 RX ADMIN — DIPHENHYDRAMINE HYDROCHLORIDE 25 MG: 25 CAPSULE ORAL at 20:39

## 2017-05-18 RX ADMIN — TOLTERODINE TARTRATE 1 MG: 1 TABLET, FILM COATED ORAL at 17:38

## 2017-05-18 RX ADMIN — INSULIN GLARGINE 40 UNITS: 100 INJECTION, SOLUTION SUBCUTANEOUS at 08:37

## 2017-05-18 RX ADMIN — ARIPIPRAZOLE 10 MG: 10 TABLET ORAL at 08:33

## 2017-05-18 RX ADMIN — OXYCODONE HYDROCHLORIDE AND ACETAMINOPHEN 1 TABLET: 5; 325 TABLET ORAL at 20:39

## 2017-05-18 RX ADMIN — SODIUM CHLORIDE 1000 ML: 900 INJECTION, SOLUTION INTRAVENOUS at 15:23

## 2017-05-18 RX ADMIN — INSULIN LISPRO 2 UNITS: 100 INJECTION, SOLUTION INTRAVENOUS; SUBCUTANEOUS at 17:37

## 2017-05-18 RX ADMIN — OXYCODONE HYDROCHLORIDE AND ACETAMINOPHEN 1 TABLET: 5; 325 TABLET ORAL at 08:34

## 2017-05-18 RX ADMIN — Medication 400 MG: at 17:37

## 2017-05-18 RX ADMIN — OXYCODONE HYDROCHLORIDE AND ACETAMINOPHEN 1 TABLET: 5; 325 TABLET ORAL at 03:33

## 2017-05-18 RX ADMIN — HEPARIN SODIUM 5000 UNITS: 5000 INJECTION, SOLUTION INTRAVENOUS; SUBCUTANEOUS at 15:24

## 2017-05-18 RX ADMIN — HEPARIN SODIUM 5000 UNITS: 5000 INJECTION, SOLUTION INTRAVENOUS; SUBCUTANEOUS at 21:24

## 2017-05-18 RX ADMIN — LEVOTHYROXINE SODIUM 100 MCG: 100 TABLET ORAL at 07:30

## 2017-05-18 NOTE — REHAB NOTE
[facility]  Bedside and Verbal shift change report given to OLIVIER Avitia (oncoming nurse) by Morro Palm RN   (offgoing nurse). Report included the following information SBAR, Kardex, MAR and Recent Results.     Situation:   Code Status: Full Code   Reason for Admission: [facility] Day: 8   Problem List:   Hospital Problems  Date Reviewed: 5/17/2017          Codes Class Noted POA    Acute renal failure superimposed on stage 3 chronic kidney disease ([facility] ICD-10-CM: N17.9, N18.3  ICD-9-CM: 584.9, 585.3  5/15/2017 No        Cellulitis of right forearm ICD-10-CM: L03.113  ICD-9-CM: 682.3  5/4/2017 Yes        Olecranon bursitis of right elbow ICD-10-CM: M70.21  ICD-9-CM: 726.33  5/4/2017 Yes        Contusion of left elbow ICD-10-CM: S50.02XA  ICD-9-CM: 923.11  5/4/2017 Yes        Impaired mobility and ADLs ICD-10-CM: Z74.09  ICD-9-CM: 799.89  5/4/2017 Yes        * (Principal)SIRS (systemic inflammatory response syndrome) ([facility] ICD-10-CM: R65.10  ICD-9-CM: 995.90  5/2/2017 Yes        Right Achilles tendinitis ICD-10-CM: M76.61  ICD-9-CM: 726.71  5/2/2017 Yes        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 congestive heart failure (Chronic) ICD-10-CM: I13.10, N18.3  ICD-9-CM: 404.10, 585.3  Unknown Yes    Overview Signed 4/23/2013 10:02 AM by César Middleton     Better controlled             Type 2 diabetes with stage 3 chronic kidney disease GFR 30-59 (HCC) (Chronic) ICD-10-CM: E11.22, N18.3  ICD-9-CM: 250.40, 585.3  Unknown Yes              Background:   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 congestive heart failure     Better controlled     Bipolar affective disorder ([facility] 12/5/2012    Cellulitis of right forearm 5/4/2017    Chronic back pain     Chronic obstructive pulmonary disease (COPD) (HCC)     SOB, on paula O2 Recent admission with psychosis     CKD stage G3a/A1, GFR 45-59 and albumin creatinine ratio <30 mg/g 5/11/2017    Urine microalbumin/Creatinine ratio (5/11/2017) = 9 mg/g    Contusion of left elbow 5/4/2017    Depression     Diabetic neuropathy associated with type 2 diabetes mellitus (HCC)     Diastolic dysfunction without heart failure     Stable on diuretics     Falls frequently     Gastroesophageal reflux disease with hiatal hernia     Generalized osteoarthritis of multiple sites     History of acute renal failure 5/31/2013    History of back injury     jumped out of second story window     History of hepatitis C     treated    History of penicillin allergy     Hypothyroidism     Hypoxemia requiring supplemental oxygen 12/29/2014    Intravenous drug user 5/2/2017    Memory difficulty     Mixed connective tissue disease ([facility] 5/10/2017    Obesity, Class I, BMI 30-34.9     Olecranon bursitis of right elbow 5/4/2017    Recurrent genital herpes 5/31/2013    Right Achilles tendinitis 5/2/2017    Sarcoidosis ([facility]     Sickle cell trait ([facility]     Type 2 diabetes with stage 3 chronic kidney disease GFR 30-59 ([facility]     Venous insufficiency     Wears glasses       Patient taking anticoagulants yes    Patient has a defibrillator: no     Assessment:   Changes in Assessment throughout shift: no     Patient has central line: no Reasons if yes: Insertion date: Last dressing date:   Patient has Renae Cath: no Reasons if yes:     Insertion date:     Last Vitals:     Vitals:    05/16/17 2039 05/17/17 0754 05/17/17 1624 05/17/17 1930   BP: 125/73 98/68 124/80 124/75   Pulse: 85 74 83 79   Resp: 18 18 18 19   Temp: 98.9 °F (37.2 °C) 98.7 °F (37.1 °C) 99.2 °F (37.3 °C) 98 °F (36.7 °C)   SpO2: 95% 97% 100% 94%   Weight:       Height: LMP: 11/25/2012        PAIN    Pain Assessment    Pain Intensity 1: 0 (05/18/17 0405) Pain Intensity 1: 2 (12/29/14 1105)    Pain Location 1: Generalized Pain Location 1: Abdomen    Pain Intervention(s) 1: Medication (see MAR) Pain Intervention(s) 1: Medication (see MAR)  Patient Stated Pain Goal: 0 Patient Stated Pain Goal: 0  o Intervention effective: yes   o Other actions taken for pain: repositioning     Skin Assessment  Skin color Skin Color: Appropriate for ethnicity  Condition/Temperature Skin Condition/Temp: Dry  Integrity Skin Integrity: Wound (add Wound LDA)  Turgor Turgor: Non-tenting  Weekly Pressure Ulcer Documentation  Pressure  Injury Documentation: No Pressure Injury Noted-Pressure Ulcer Prevention Initiated  Wound Prevention & Protection Methods  Orientation of wound Orientation of Wound Prevention: Posterior  Location of Prevention Location of Wound Prevention: Sacrum/Coccyx  Dressing Present Dressing Present : No  Dressing Status    Wound Offloading Wound Offloading (Prevention Methods): Bed, pressure redistribution/air     INTAKE/OUPUT    Date 05/17/17 0700 - 05/18/17 0659 05/18/17 0700 - 05/19/17 0659   Shift 2352-9539 7612-3219 24 Hour Total 9757-6451 0946-4778 24 Hour Total   I  N  T  A  K  E   P. O. 600  600         P. O. 600  600       Shift Total  (mL/kg) 600  (6.3)  600  (6.3)      O  U  T  P  U  T   Urine  (mL/kg/hr)            Urine Occurrence(s) 2 x 2 x 4 x       Stool            Stool Occurrence(s) 1 x 0 x 1 x       Shift Total  (mL/kg)           600      Weight (kg) 95.3 95.3 95.3 95.3 95.3 95.3       Recommendations:  1. Patient needs and requests: none    2. Diet: diabetic    3. Pending tests/procedures: am labs     4. Functional Level/Equipment: assist x 2/wheelchair    5.  Estimated Discharge Date: tbd Posted on Whiteboard in Patients Room: no     HEALS Safety Check    A safety check occurred in the patient's room between off going nurse and oncoming nurse listed above.    The safety check included the below items  Area Items   H  High Alert Medications - Verify all high alert medication drips (heparin, PCA, etc.)   E  Equipment - Suction is set up for ALL patients (with lashay)  - Red plugs utilized for all equipment (IV pumps, etc.)  - WOWs wiped down at end of shift.  - Room stocked with oxygen, suction, and other unit-specific supplies   A  Alarms - Bed alarm is set for fall risk patients  - Ensure chair alarm is in place and activated if patient is up in a chair   L  Lines - Check IV for any infiltration  - Renae bag is empty if patient has a Renae   - Tubing and IV bags are labeled   S  Safety   - Room is clean, patient is clean, and equipment is clean. - Hallways are clear from equipment besides carts. - Fall bracelet on for fall risk patients  - Ensure room is clear and free of clutter  - Suction is set up for ALL patients (with lashay)  - Hallways are clear from equipment besides carts.    - Isolation precautions followed, supplies available outside room, sign posted   Rita Oreilly RN

## 2017-05-18 NOTE — REHAB NOTE
SHIFT CHANGE NOTE FOR [facility]  Bedside and Verbal shift change report given toJESSICA ALVARADO (oncoming nurse) by Xu Rojas LPN   (offgoing nurse). Report included the following information SBAR, Kardex, MAR and Recent Results.     Situation:   Code Status: Full Code   Reason for Admission: [facility] Day: 8   Problem List:   Hospital Problems  Date Reviewed: 5/18/2017          Codes Class Noted POA    Acute renal failure superimposed on stage 3 chronic kidney disease ([facility] ICD-10-CM: N17.9, N18.3  ICD-9-CM: 584.9, 585.3  5/15/2017 No        Cellulitis of right forearm ICD-10-CM: L03.113  ICD-9-CM: 682.3  5/4/2017 Yes        Olecranon bursitis of right elbow ICD-10-CM: M70.21  ICD-9-CM: 726.33  5/4/2017 Yes        Contusion of left elbow ICD-10-CM: S50.02XA  ICD-9-CM: 923.11  5/4/2017 Yes        Impaired mobility and ADLs ICD-10-CM: Z74.09  ICD-9-CM: 799.89  5/4/2017 Yes        * (Principal)SIRS (systemic inflammatory response syndrome) ([facility] ICD-10-CM: R65.10  ICD-9-CM: 995.90  5/2/2017 Yes        Right Achilles tendinitis ICD-10-CM: M76.61  ICD-9-CM: 726.71  5/2/2017 Yes        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 congestive heart failure (Chronic) ICD-10-CM: I13.10, N18.3  ICD-9-CM: 404.10, 585.3  Unknown Yes    Overview Signed 4/23/2013 10:02 AM by Ruby Medina     Better controlled             Type 2 diabetes with stage 3 chronic kidney disease GFR 30-59 ([facility] (Chronic) ICD-10-CM: E11.22, N18.3  ICD-9-CM: 250.40, 585.3  Unknown Yes              Background:   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 congestive heart failure     Better controlled     Bipolar affective disorder ([facility] 12/5/2012    Cellulitis of right forearm 5/4/2017    Chronic back pain     Chronic obstructive pulmonary disease (COPD) (HCC)     SOB, on paula O2 Recent admission with psychosis     CKD stage G3a/A1, GFR 45-59 and albumin creatinine ratio <30 mg/g 5/11/2017    Urine microalbumin/Creatinine ratio (5/11/2017) = 9 mg/g    Contusion of left elbow 5/4/2017    Depression     Diabetic neuropathy associated with type 2 diabetes mellitus (HCC)     Diastolic dysfunction without heart failure     Stable on diuretics     Falls frequently     Gastroesophageal reflux disease with hiatal hernia     Generalized osteoarthritis of multiple sites     History of acute renal failure 5/31/2013    History of back injury     jumped out of second story window     History of hepatitis C     treated    History of penicillin allergy     Hypothyroidism     Hypoxemia requiring supplemental oxygen 12/29/2014    Intravenous drug user 5/2/2017    Memory difficulty     Mixed connective tissue disease ([facility] 5/10/2017    Obesity, Class I, BMI 30-34.9     Olecranon bursitis of right elbow 5/4/2017    Recurrent genital herpes 5/31/2013    Right Achilles tendinitis 5/2/2017    Sarcoidosis ([facility]     Sickle cell trait ([facility]     Type 2 diabetes with stage 3 chronic kidney disease GFR 30-59 ([facility]     Venous insufficiency     Wears glasses       Patient taking anticoagulants yes    Patient has a defibrillator: no     Assessment:   Changes in Assessment throughout shift: no     Patient has central line: no Reasons if yes: Insertion date: Last dressing date:   Patient has Renae Cath: no Reasons if yes:     Insertion date:     Last Vitals:     Vitals:    05/17/17 1624 05/17/17 1930 05/18/17 0800 05/18/17 1600   BP: 124/80 124/75 (!) 157/97 116/73   Pulse: 83 79 78 79   Resp: 18 19 20 18   Temp: 99.2 °F (37.3 °C) 98 °F (36.7 °C) 97.7 °F (36.5 °C) 98.5 °F (36.9 °C)   SpO2: 100% 94% 94% 96%   Weight:       Height: LMP: 11/25/2012        PAIN    Pain Assessment    Pain Intensity 1: 4 (05/18/17 1800) Pain Intensity 1: 2 (12/29/14 1105)    Pain Location 1: Knee, Ankle Pain Location 1: Abdomen    Pain Intervention(s) 1: Medication (see MAR) Pain Intervention(s) 1: Medication (see MAR)  Patient Stated Pain Goal: 0 Patient Stated Pain Goal: 0  o Intervention effective: yes   o Other actions taken for pain: repositioning     Skin Assessment  Skin color Skin Color: Appropriate for ethnicity  Condition/Temperature Skin Condition/Temp: Dry  Integrity Skin Integrity: Wound (add Wound LDA)  Turgor Turgor: Non-tenting  Weekly Pressure Ulcer Documentation  Pressure  Injury Documentation: No Pressure Injury Noted-Pressure Ulcer Prevention Initiated  Wound Prevention & Protection Methods  Orientation of wound Orientation of Wound Prevention: Posterior  Location of Prevention Location of Wound Prevention: Sacrum/Coccyx  Dressing Present Dressing Present : No  Dressing Status    Wound Offloading Wound Offloading (Prevention Methods): Bed, pressure redistribution/air     INTAKE/OUPUT    Date 05/17/17 0700 - 05/18/17 0659 05/18/17 0700 - 05/19/17 0659   Shift 6002-4425 8431-2375 24 Hour Total 9197-5259 5040-0021 24 Hour Total   I  N  T  A  K  E   P. O. 600  600 240  240      P. O. 600  600 240  240    Shift Total  (mL/kg) 600  (6.3)  600  (6.3) 240  (2.5)  240  (2.5)   O  U  T  P  U  T   Urine  (mL/kg/hr)            Urine Occurrence(s) 2 x 2 x 4 x 4 x  4 x    Stool            Stool Occurrence(s) 1 x 0 x 1 x 0 x  0 x    Shift Total  (mL/kg)           600 240  240   Weight (kg) 95.3 95.3 95.3 95.3 95.3 95.3       Recommendations:  1. Patient needs and requests: none    2. Diet: diabetic    3. Pending tests/procedures: am labs     4. Functional Level/Equipment: assist x 2/wheelchair    5.  Estimated Discharge Date: tbd Posted on Whiteboard in Patients Room: no     HEALS Safety Check    A safety check occurred in the patient's room between off going nurse and oncoming nurse listed above. The safety check included the below items  Area Items   H  High Alert Medications - Verify all high alert medication drips (heparin, PCA, etc.)   E  Equipment - Suction is set up for ALL patients (with lashay)  - Red plugs utilized for all equipment (IV pumps, etc.)  - WOWs wiped down at end of shift.  - Room stocked with oxygen, suction, and other unit-specific supplies   A  Alarms - Bed alarm is set for fall risk patients  - Ensure chair alarm is in place and activated if patient is up in a chair   L  Lines - Check IV for any infiltration  - Renae bag is empty if patient has a Renae   - Tubing and IV bags are labeled   S  Safety   - Room is clean, patient is clean, and equipment is clean. - Hallways are clear from equipment besides carts. - Fall bracelet on for fall risk patients  - Ensure room is clear and free of clutter  - Suction is set up for ALL patients (with lashay)  - Hallways are clear from equipment besides carts.    - Isolation precautions followed, supplies available outside room, sign posted   Otilio Meade LPN

## 2017-05-18 NOTE — PROGRESS NOTES
Problem: Self Care Deficits Care Plan (Adult)  Goal: *Acute Goals and Plan of Care (Insert Text)  Long Term Goals (to be met upon discharge date) in order to increase pts functional independence and safety, and decrease burden of care:  1. Pt will perform self-feeding with setup. 2. Pt will perform grooming with setup  3. Pt will perform UB bathing with Min A.  4. Pt will perform LB bathing with Min A.  5. Pt will perform tub/shower transfer with Min A.   6. Pt will perform UB dressing with Min A.  7. Pt will perform LB dressing with Min A.  8. Pt will perform toileting task with Min A.  9. Pt will perform toilet transfer with Min A. Short Term Weekly Goals for (5/11/2017 to 5/18/2017) in order to increase pts functional independence and safety, and decrease burden of care:  1. Pt will perform self-feeding with Mod A.   2. Pt will perform grooming with Mod A.  3. Pt will perform UB bathing with Mod A.  4. Pt will perform LB bathing with Max A.  5. Pt will perform tub/shower transfer with Max A.  6. Pt will perform UB dressing with Max A.  7. Pt will perform LB dressing with Max A.  8. Pt will perform toileting task with Max A.  9. Pt will perform toilet transfer with Max A.   OT WEEKLY PROGRESS NOTE  Patient Name:Stephen Sinha  Time Spent With Patient  Time In: 0930  Time Out: 1100     Medical Diagnosis:  Right forearm cellulits  SIRS (systemic inflammatory response syndrome) (HCC) SIRS (systemic inflammatory response syndrome) (HCC)      Pain at start of tx:8  Pain at stop of tx:8          Subjective: \" I need to sit. \"            Objective: Pt stood x 9 minutes to perform standing tolerance puzzle activity with RW and S. Pt took a seated rest break and performed puzzle activity seated edge of chair unsupported for trunk strengthening. Pt then stood x 11 minutes to complete puzzle activity with RW and S to improve activity tolerance.  Issued pt red t- putty for B gross grasp, pinch, and intrinsic muscle strengthening with pt performing each exercise x 10 reps x 2 sets with rest breaks. Outcome Measures:      AROM: WNL        COGNITION/PERCEPTION Initial Assessment Weekly Progress Assessment 5/18/2017   Premorbid Reading Status Literate  Literate    Premorbid Writing Status [facility]   Arousal/Alertness Generalized responses  generalized responses    Orientation Level Oriented X4 Oriented X4   Visual Fields  (Appears Intact) Appears intact     Praxis       Body Scheme Cues to maintain midline in sitting, Cues to maintain midline in standing, Tactile, Verbal, Visual     COMPREHENSION MODE Initial Assessment Weekly Progress Assessment 5/18/2017   Primary Mode of Comprehension Auditory  Auditory   Hearing Aide       Corrective Lenses Glasses  Glasses   Score 5  5      EXPRESSION Initial Assessment Weekly Progress Assessment 5/18/2017   Primary Mode of Expression Verbal Verbal   Score 6 6   Comments          SOCIAL INTERACTION/ PRAGMATICS Initial Assessment Weekly Progress Assessment 5/18/2017   Score 4 5   Comments          PROBLEM SOLVING Initial Assessment Weekly Progress Assessment 5/18/2017   Score 4 4   Comments          MEMORY Initial Assessment Weekly Progress Assessment 5/18/2017   Score 5 5   Comments          EATING Initial Assessment Weekly Progress Assessment 5/18/2017   Functional Level 2  4   Comments Pt demonstrates ability to scoop eggs using fork and bring them to her mouth with significantly increaed time and effort, however she fatigues easily and needs assistance to feed herself majority of a meal. Pt is able to hold a small cup with her R hand and bring to her mouth to drink. (min A)       GROOMING Initial Assessment Weekly Progress Assessment 5/18/2017   Functional Level 1 Grooming  Grooming Assistance : 4 (Minimal assistance)  Comments: Pt washed her face during shower with setup for OT to provide pt with washcloth. She combed her hair with initial encouragement needed to attempt.  She needed assistance to comb the back of her head as she was unable to reach. Tasks completed by patient Brushed teeth, Brushed hair, Washed face     Comments OT placed washcloth in pt's R hand. She was able to partially assist in washing her face with maximal encouragement. To perform oral care, OT applied toothpaste to toothbrush and pt was able to bring toothbrush to her mouth, however pt was unable to actively brush her teeth due to decreased strength and coordination. BATHING Initial Assessment Weekly Progress Assessment 5/18/2017   Functional Level 1 Upper Body Bathing  Bathing Assistance, Upper: 4 (Minimal assistance)  Position Performed: Seated in chair  Comments: Pt able to wash full UB bathing with encouragement to attempt to initiate task. Pt required assistance for washing RUE and axilla area due to impaired RUE ROM and increased pain. Lower Body Bathing  Bathing Assistance, Lower : 4 (Minimal assistance)  Position Performed: Seated in chair  Adaptive Equipment: Long handled sponge  Comments: Pt completed LB bathing while seated on shower bench. Following education and encouragement to utilize, pt able to use LHS to wash B lower legs and R foot. Additional cues also needed to scoot forward on bench in order for pt to efficiently wash tyrell area. Pt unable to reach to wash buttocks, therefore OT assisted pt with washing buttocks while she weightshifted side to side with supervision. Body parts patient bathed Abdomen, Chest     Comments Pt completed full body bathing at bed level due to impaired strength, balance, and overall activity tolerance. OT placed washcloth in pt's R hand, and she was able to very minimally attempt to wash her chest and abdomen with increased cueing and coordination. Pt required the assistance of 2 people to wash buttocks as she required Max A-Total A x1 for bed mobility while 2nd person assisted with washing. Pt unable to reach to assist in washing her legs or tyrell area. TUB/SHOWER TRANSFER INDEPENDENCE Initial Assessment Weekly Progress Assessment 5/18/2017   Score 0 Functional Transfers  Tub or Shower Type: Shower  Amount of Assistance Required: 4 (Minimal assistance)  Adaptive Equipment: Tub transfer bench;Walker (comment)   Comments Pt not safe to perform shower transfer at this time due to impaired strength, balance, and overall activity tolerance. Pt performed stand step transfer w/c <> tub transfer bench in roll-in shower utilizing RW with Min A needed for ant and up weightshift and to control descent with stand to sit. Pt able to maintain balance while stepping with RW with CGA only. UPPER BODY DRESSING/UNDRESSING Initial Assessment Weekly Progress Assessment 5/18/2017   Functional Level 1 Upper Body Dressing   Dressing Assistance : 3 (Moderate assistance)  Comments: Pt doffed pullover shirt with significantly increased time and encouragement to do so. To shante shirt, she is able to thread UEs into shirt sleeves then refused to attempt to pull shirt overhead due to decreased UE mobility and increased pain. She is dependent for management of bra fastener due to inability to reach behind and decreased hand strength/coordination. Items applied/Steps completed Bra (3 steps), Pullover (4 steps)     Comments Pt donned bra and t-shirt while sitting up on EOB with SBA for sitting balance and safety. Pt required Total A for threading shirt over BUEs and to pull shirt overhead due to impaired BUE AROM and strength. LOWER BODY DRESSING/UNDRESSING Initial Assessment Weekly Progress Assessment 5/18/2017   Functional Level 1 Lower Body Dressing   Dressing Assistance : 3 (Moderate assistance)  Leg Crossed Method Used: No  Position Performed: Seated in chair;Standing  Adaptive Equipment Used: Reacher;Sock aid  Comments: OT educated pt on use of reacher and sock aid to attempt to increase independence with LB dressing.  Following education and demonstration, pt was able to push pants off B feet with significantly increased time and effort. She needed Min A for managing socks over her heels using reacher. Pt required additional assistance for threading LEs into pants legs. She was able to pull brief and pants up over buttocks with slight assistance needed for getting them up all the way in the back while standing with CGA. Pt unable to shante socks with sock aid despite maximal encouragement and cues for the technique due to c/o increased RLE and UE pain when attempting to implement the technique. Items applied/Steps completed Elastic waist pants (3 steps), Sock, left (1 step), Sock, right (1 step)     Comments Pt requires Total A for managing all aspects of LB dressing at bed level. Pt performed rolling with Max-Total A x1 while second person assisted with donning of brief and pants over buttocks. Pt unable to reach her feet and needs assistance for doffing/donning socks and pants over B feet. TOILETING Initial Assessment Weekly Progress Assessment 5/18/2017   Functional Level 2  3   Comments Pt completed toileting task at bed level utilizing bed pain. She needs the assistance of 2 people in order to perform all aspects of hygiene and clothing management with Max-Total A x1 for rolling. (mod A )   Min A for hygiene; mod A for clothing mgmnt       TOILET TRANSFER INDEPENDENCE Initial Assessment Weekly Progress Assessment 5/18/2017   Transfer score 0 4     Comments Based on bed to w/c transfer, pt would require the assistance of 2 people for ant and up weightshift, balance, weightshift, RLE advance, and slow controlled descent with stand to sit to complete stand step transfer without AD to/from [facility]. ( min A)   Pt required cues for safety and hand placement. Min A for balance. Assessment: Pt progressing well meeting all STGs. Plan of Care: Please see Care Plan for updated LTGs.    Family Training:        Kumar FORREST 5/18/17

## 2017-05-18 NOTE — PROGRESS NOTES
Problem: Mobility Impaired (Adult and Pediatric)  Goal: *Acute Goals and Plan of Care (Insert Text)  Physical Therapy Goals  STGs  Initiated 2017 and to be accomplished within 7 day(s) [17]  1. Patient will roll side to side in bed with moderate assistance. 2. Patient will perform supine to sit and sit to supine with moderate assistance. 3. Patient will transfer from bed to chair and chair to bed with moderate assistance using the least restrictive device. 4. Patient will perform sit to stand with moderate assistance . 5. Patient will propel w/c on level surface for 50 ft with R UE and B LEs with min A. LTGs  Initiated 2017 and to be accomplished within 4 weeks [17]  1. Patient will roll side to side in bed with contact guard assist.   2. Patient will perform supine to sit and sit to supine in bed with contact guard assistance. 3. Patient will transfer from bed to chair and chair to bed with contact guard assistance using the least restrictive device. 4. Patient will perform sit to stand with contact guard assist.  5. Patient will ambulate with minimal assistance/contact guard assist for 50 feet with the least restrictive device. 5. Patient will ascend/descend 3 stairs with bilateral handrail(s) with moderate assistance. PT WEEKLY PROGRESS NOTE  Patient Name:Stephen Sinha   Time In: 1330   Time Out: 1500   Pt seen for functional mobility training and assessment. Subjective: Pt continues to make statements such as, \"I can't,\" and initiates mobility by seeking assistance first requiring max encouragement for full effort. Objective:         Outcome Measures: FIM     Pain at start of tx: Pt hyper-focused on B UE and B LE pain.   Pain at stop of tx: Pt hyper-focused on B UE and B LE pain     Patient identified with name and : yes                    AROM: [facility]      FIM SCORES Initial Assessment Weekly Progress Assessment 2017   Bed/Chair/Wheelchair Transfers 2 4   Wheelchair Mobility 1 5   Walking [place] 1 2   Steps/Stairs 0 1   Please see [facility] Interdisciplinary Eval: Coordination/Balance Section for details regarding FIM score description. BED/CHAIR/WHEELCHAIR TRANSFERS Initial Assessment Weekly Progress Assessment 5/18/2017   Rolling Right 1 (Total assistance) 6 (Modified independent) with increased time to perform   Rolling Left 1 (Total assistance) 6 (Modified independent) with increased time to perform   Supine to Sit 1 (Total assistance) 6 (Modified independent) with increased time to perform   Sit to Stand Maximum assistance (in p-bars, pull to stand) Minimal assistance   Pt performed 5 sit <> stand transfers w/c <> RW with variable assistance from minimal assistance to close supervision/CGA with max encouragement and tactile cuing for initiating engaging B UEs and B LEs for lift off from w/c height. Sit to Supine 1 (Total assistance) 6 (Modified independent) with increased time to perform   Transfer Type Lateral with transfer board Stand Step with RW   Comments pt with difficulty wt shifting, pushing with UE's, and scooting Pt requires minimal assistance for sit <> stand as described above and then CGA for balance with weight shifting for advancing LEs for stand step transfers. Car Transfer Not tested NT   Car Type n/a N/A       GROSS ASSESSMENT Weekly Progress Assessment 5/18/2017   AROM Generally decreased, functional   Strength Generally decreased, functional   Coordination Generally decreased, functional   Tone Normal   Sensation Impaired   PROM Generally decreased, functional       POSTURE Weekly Progress Assessment 5/18/2017   Posture (WDL) Exceptions to WDL   Posture Assessment Rounded shoulders;Trunk flexion       WHEELCHAIR MOBILITY/MANAGEMENT Initial Assessment Weekly Progress Assessment 5/18/2017   Able to Propel 10 feet 170 feet with supervision and max encouragement and verbal cues for efficient w/c propulsion.    Curbs/ramps assistance required 0 (Not tested) NT   Wheelchair set up assistance required 2 (Maximal assistance) Mod assist   Wheelchair management Other (comment) (needs help to manage brakes at this time) Manages left brake;Manages right brake       WALKING INDEPENDENCE Initial Assessment Weekly Progress Assessment 5/18/2017   Assistive device Other (comment) (attempted in p-bars, but unable to advance LE's) Walker, rolling   Ambulation assistance - level surface Unable CGA/Minimal assistance with RW   Distance 0 Feet (ft) 114 Feet (ft)   Comments attempted in p-bars, pt unable to wt shift and advance LE's, pain R knee/ankle limits Pt ambulates with slow pace with decreased B foot clearance with excessive B UE weight bearing on RW requiring max verbal cues for decreased fwd flexed posture. GAIT Weekly Progress Assessment 5/18/2017   Gait Description (WDL) Exceptions to WDL   Gait Abnormalities Decreased step clearance       STEPS/STAIRS Initial Assessment Weekly Progress Assessment 5/18/2017   Steps/Stairs ambulated 0 Last assessed 5/17/2017  2 (6\") steps   Rail Use Both B   Comments n/a at this time due to inability to stand/walk Pt requires minimal assistance for balance and safety with negotiating stairs with step to step pattern. Curbs/Ramps   NT              Assessment: Pt has progressed to achieve STGs set upon initial evaluation. However, pt continues to demonstrate self-limiting behavior requiring max encouragement for mobility. Plan of Care: Please see Care Plan for updated LTGs. Family Training:  To Be Scheduled (Discussed with unit )     Quirino Hagan, PT, DPT  5/18/2017

## 2017-05-18 NOTE — PROGRESS NOTES
[facility] PHYSICAL REHABILITATION  [address] [place] [address], [address]     INPATIENT REHABILITATION  DAILY PROGRESS NOTE     Date: 5/18/2017    Name: Lina Sanchez Age / Sex: 62 y.o. / female   CSN: [id number] MRN: [id number]   [facility] Date: 5/10/2017 Length of Stay: 8 days     Primary Rehab Diagnosis: Impaired Mobility and ADLs secondary to Systemic inflammatory response syndrome (SIRS) secondary to:  1. Cellulitis of the right forearm  2. Olecranon bursitis of the right elbow  3. Contusion of left elbow  4. Right Achilles tendonitis      Subjective:     Patient seen and examined. Blood pressure controlled. Blood sugars fairly controlled. Patient's Complaint:   No significant medical complaints     Pain Control: ongoing significant pain in which is stable and controlled by current meds      Objective:     Vital Signs:  Patient Vitals for the past 24 hrs:   BP Temp Pulse Resp SpO2   05/18/17 0800 (!) 157/97 97.7 °F (36.5 °C) 78 20 94 %   05/17/17 1930 124/75 98 °F (36.7 °C) 79 19 94 %   05/17/17 1624 124/80 99.2 °F (37.3 °C) 83 18 100 %        Physical Examination:  GENERAL SURVEY: Patient is awake, alert, oriented x 3, sitting comfortably on the chair, not in acute respiratory distress. HEENT: pink palpebral conjunctivae, anicteric sclerae, no nasoaural discharge, moist oral mucosa  NECK: supple, no jugular venous distention, no palpable lymph nodes  CHEST/LUNGS: symmetrical chest expansion, good air entry, clear breath sounds  HEART: adynamic precordium, good S1 S2, no S3, regular rhythm, no murmurs  ABDOMEN: obese, bowel sounds appreciated, soft, non-tender  EXTREMITIES: (+) left hand wrapped in gauze, pink nailbeds, (+) warmth/erythema/swelling of both elbows, (+) bipedal edema, full and equal pulses, no calf tenderness   NEUROLOGICAL EXAM: The patient is awake, alert and oriented x3, able to answer questions fairly appropriately, able to follow 1 and 2 step commands.  Able to tell time from the wall clock. Cranial nerves II-XII are grossly intact. No gross sensory deficit. Motor strength is 2+/5 on both shoulders, 3/5 on the right elbow, 3-/5 on the left elbow, 2+/5 on both wrists, 3+/5 on the right handgrip, 3/5 on the left handgrip, 3-/5 on the right hip, 3/5 on the left hip, 3-/5 on both knees and both ankles.       Current Medications:  Current Facility-Administered Medications   Medication Dose Route Frequency    oxyCODONE-acetaminophen (PERCOCET) 5-325 mg per tablet 1 Tab  1 Tab Oral TID    oxyCODONE-acetaminophen (PERCOCET) 5-325 mg per tablet 1 Tab  1 Tab Oral Q4H PRN    insulin glargine (LANTUS) injection 20 Units  20 Units SubCUTAneous QHS    insulin glargine (LANTUS) injection 40 Units  40 Units SubCUTAneous ACB    tolterodine (DETROL) tablet 1 mg  1 mg Oral BID    famotidine (PEPCID) tablet 20 mg  20 mg Oral DAILY    gabapentin (NEURONTIN) capsule 300 mg  300 mg Oral Q12H    diphenhydrAMINE (BENADRYL) capsule 25 mg  25 mg Oral QHS    magnesium oxide (MAG-OX) tablet 400 mg  400 mg Oral BID    cholecalciferol (VITAMIN D3) tablet 1,000 Units  1,000 Units Oral DAILY    acetaminophen (TYLENOL) tablet 650 mg  650 mg Oral Q4H PRN    docusate sodium (COLACE) capsule 100 mg  100 mg Oral BID    bisacodyl (DULCOLAX) tablet 10 mg  10 mg Oral Q48H PRN    heparin (porcine) injection 5,000 Units  5,000 Units SubCUTAneous Q8H    insulin lispro (HUMALOG) injection   SubCUTAneous TIDAC    albuterol (PROVENTIL VENTOLIN) nebulizer solution 2.5 mg  2.5 mg Nebulization Q4H PRN    divalproex DR (DEPAKOTE) tablet 250 mg  250 mg Oral QHS    ARIPiprazole (ABILIFY) tablet 10 mg  10 mg Oral DAILY    traZODone (DESYREL) tablet 100 mg  100 mg Oral QHS    rosuvastatin (CRESTOR) tablet 5 mg  5 mg Oral QHS    aspirin chewable tablet 81 mg  81 mg Oral DAILY WITH BREAKFAST    levothyroxine (SYNTHROID) tablet 100 mcg  100 mcg Oral ACB    sertraline (ZOLOFT) tablet 50 mg  50 mg Oral DAILY    umeclidinium-vilanterol (ANORO ELLIPTA) 62.5 mcg- 25 mcg/inhalation  1 Puff Inhalation QAM RT    B complex-vitaminC-folic acid (NEPHROCAP) cap  1 Cap Oral DAILY       Allergies:   Allergies   Allergen Reactions    Moxifloxacin Rash    Pcn [Penicillins] Swelling    Sulfa (Sulfonamide Antibiotics) Swelling       Functional Progress:    OCCUPATIONAL THERAPY    ON ADMISSION MOST RECENT   Eating  Functional Level: 2   Eating  Functional Level: 2     Grooming  Functional Level: 1   Grooming  Functional Level: 1     Bathing  Functional Level: 1   Bathing  Functional Level: 1     Upper Body Dressing  Functional Level: 1   Upper Body Dressing  Functional Level: 1     Lower Body Dressing  Functional Level: 1   Lower Body Dressing  Functional Level: 1     Toileting  Functional Level: 2   Toileting  Functional Level: 1     Toilet Transfers  Toilet Transfer Score: 0   Toilet Transfers  Toilet Transfer Score: 1     Tub /Shower Transfers  Tub/Shower Transfer Score: 0   Tub/Shower Transfers  Tub/Shower Transfer Score: 0       Legend:   7 - Independent   6 - Modified Independent   5 - Standby Assistance / Supervision / Set-up   4 - Minimum Assistance / Contact Guard Assistance   3 - Moderate Assistance   2 - Maximum Assistance   1 - Total Assistance / Dependent       Lab/Data Review:  Recent Results (from the past 24 hour(s))   GLUCOSE, POC    Collection Time: 05/17/17  4:40 PM   Result Value Ref Range    Glucose (POC) 210 (H) 70 - 110 mg/dL   GLUCOSE, POC    Collection Time: 05/17/17  9:19 PM   Result Value Ref Range    Glucose (POC) 181 (H) 70 - 151 mg/dL   METABOLIC PANEL, BASIC    Collection Time: 05/18/17  6:33 AM   Result Value Ref Range    Sodium 141 136 - 145 mmol/L    Potassium 5.2 3.5 - 5.5 mmol/L    Chloride 103 100 - 108 mmol/L    CO2 32 21 - 32 mmol/L    Anion gap 6 3.0 - 18 mmol/L    Glucose 140 (H) 74 - 99 mg/dL    BUN 15 7.0 - 18 MG/DL    Creatinine 1.60 (H) 0.6 - 1.3 MG/DL    BUN/Creatinine ratio 9 (L) 12 - 20 GFR est AA 40 (L) >60 ml/min/1.73m2    GFR est non-AA 33 (L) >60 ml/min/1.73m2    Calcium 10.0 8.5 - 10.1 MG/DL   HGB & HCT    Collection Time: 05/18/17  6:33 AM   Result Value Ref Range    HGB 9.8 (L) 12.0 - 16.0 g/dL    HCT 30.7 (L) 35.0 - 45.0 %   PLATELET    Collection Time: 05/18/17  6:33 AM   Result Value Ref Range    PLATELET 978 040 - 686 K/uL   GLUCOSE, POC    Collection Time: 05/18/17  8:13 AM   Result Value Ref Range    Glucose (POC) 107 70 - 110 mg/dL   GLUCOSE, POC    Collection Time: 05/18/17 11:41 AM   Result Value Ref Range    Glucose (POC) 210 (H) 70 - 110 mg/dL       Estimated Glomerular Filtration Rate:  CKD-EPI:   On admission, estimated GFR was 53.6 mL/min/1.73m2 based on a Creatinine of 1.26 mg/dl. Most recent estimated GFR was 36.8 mL/min/1.73m2 based on a Creatinine of 1.72 mg/dl. MDRD:   On admission, estimated GFR was 55.7 mL/min/1.73m2 based on a Creatinine of 1.26 mg/dl. Most recent estimated GFR was 38.9 mL/min/1.73m2 based on a Creatinine of 1.72 mg/dl. Assessment:     Primary Rehabilitation Diagnosis  1. Impaired Mobility and ADLs  2. Systemic inflammatory response syndrome (SIRS) secondary to:   a. Cellulitis of the right forearm   b. Olecranon bursitis of the right elbow   c. Contusion of left elbow   d.  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 heart failure    Depression    History of back injury    Anxiety    Wears glasses    History of hepatitis C    Sickle cell trait ([facility] [address]    History of acute renal failure    Hypothyroidism    Recurrent genital herpes    Sarcoidosis ([facility] [address]    History pf abscess of right arm    Hypoxemia requiring supplemental oxygen    Abnormal nuclear stress test    History of cellulitis and abscess of hand    History of cellulitis and abscess of foot    Intravenous drug user    History of penicillin allergy    Falls frequently    Gastroesophageal reflux disease with hiatal hernia    Memory difficulty    Mixed connective tissue disease     CKD stage G3a/A1, GFR 45-59 and albumin creatinine ratio <30 mg/g     Acute renal failure superimposed on stage 3 chronic kidney disease       Plan:     1. Justification for continued stay: Good progression towards established rehabilitation goals. 2. Medical Issues being followed closely:    [x]  Fall and safety precautions     [x]  Wound Care     [x]  Bowel and Bladder Function     [x]  Fluid Electrolyte and Nutrition Balance     [x]  Pain Control      3. Issues that 24 hour rehabilitation nursing is following:    [x]  Fall and safety precautions     [x]  Wound Care     [x]  Bowel and Bladder Function     [x]  Fluid Electrolyte and Nutrition Balance     [x]  Pain Control      [x]  Assistance with and education on in-room safety with transfers to and from the bed, wheelchair, toilet and shower. 4. Acute rehabilitation plan of care:    [x]  Continue current care and rehab. [x]  Physical Therapy           [x]  Occupational Therapy           []  Speech Therapy     []  Hold Rehab until further notice     5. Medications:    [x]  MAR Reviewed     [x]  Continue Present Medications     6. DVT Prophylaxis:      []  Lovenox     [x]  Unfractionated Heparin     []  Coumadin     []  NOAC     [x]  ROBERT Stockings     []  Sequential Compression Device     []  None     7.  Orders:   > Systemic inflammatory response syndrome (SIRS) secondary to Cellulitis of the right forearm, Olecranon bursitis of the right elbow, Contusion of left elbow and Right Achilles tendonitis    > Patient had completed the recommended treatment course of oral Clindamycin on 5/13/2017     > Abnormally low HDL with hypertriglyceridemia   > Continue Rosuvastatin 5 mg PO q HS     > 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 congestive heart failure    > On 5/11/2017, discontinued Furosemide 20 mg PO once daily (re: rising Creatinine)     > Bipolar affective disease   > Continue:    > Aripiprazole 10 mg PO once daily    > Divalproex  mg PO q HS     > Chronic obstructive pulmonary disease   > Continue:    > Anoro Ellipta 1 puff inhaled once daily    > Albuterol nebulization q 4 hr PRN for shortness of breath     > Depression / Anxiety   > Continue:    > Aripiprazole 10 mg PO once daily    > Sertraline 50 mg PO once daily    > Trazodone 100 mg PO q HS     > Diabetic neuropathy   > On 5/15/2017, decreased Gabapentin from 300 mg PO q 8 hr to 300 mg PO q 12 hr (re: decrease in CrCl)   > Continue Gabapentin 300 mg PO q 12 hr     > Diastolic dysfunction without heart failure   > On 5/11/2017, discontinued Furosemide 20 mg PO once daily (re: rising Creatinine)   > On 5/14/2017, resumed Eplerenone 25 mg PO once daily    > On 5/15/2017, discontinued Eplerenone 25 mg PO once daily (re: decrease in CrCl)     > Gastroesophageal reflux disease with hiatal hernia   > On 5/15/2017, decreased Famotidine from 20 mg PO BID to 20 mg PO once daily   > Continue Famotidine 20 mg PO once daily     > Hypothyroidism   > Continue Levothyroxine 100 mcg PO once daily before breakfast     > Hypoxemia requiring supplemental oxygen   > Continue O2 inhalation 2 LPM via nasal cannula continuous     > Type 2 diabetes mellitus with diabetic neuropathy and stage 3 chronic kidney disease    > Prior to admission to [facility] the patient claimed she wa snot using Lantus at home; instead, she was using Insulin U500 on a sliding scale basis   > Continue:    > Change Lantus from 65 units SC q HS to 40 units SC q AM and 20 units SC q PM    > Humalog insulin sliding scale SC TID AC only     > Mg (5/11/2017, on admission) = 1.6   > Patient was started on Mg(OH)2 400 mg PO BID   > Continue Mg(OH)2 400 mg PO BID    > CKD stage G3a/A1, GFR 45-59 and albumin creatinine ratio <30 mg/g     > On admission, based on a Creatinine of 1.26 mg/dl:    Estimated GFR using CKD-EPI = 53.6 mL/min/1.73m2    Estimated GFR using MDRD = 55.7 mL/min/1.73m2    > Urine microalbumin/Creatinine ratio (5/11/2017) = 9 mg/g    > Acute renal failure superimposed on stage 3 chronic kidney disease   > BUN/Creatinine (5/10/2017) = 19/1.33    > BUN/Creatinine (5/11/2017, on admission) = 16/1.26    > On 5/12/2017:    > Discontinued Diclofenac 2 grams applied to affected areas 4 times daily     > Started Naproxen 375 mg PO BID with meals   > On 5/14/2017, resumed Eplerenone 25 mg PO once daily    > BUN/Creatinine (5/15/2017) = 22/1.87    > Urinalysis (5/15/2017) showed SG 1.014, no casts   > On 5/15/2017:    > Discontinued Naproxen 375 mg PO BID with meals    > Discontinued Eplerenone 25 mg PO once daily     > Decreased Gabapentin from 300 mg PO q 8 hr to 300 mg PO q 12 hr     > Decreased Famotidine from 20 mg PO BID to 20 mg PO once daily    > Patient was given IVF: 0.9%  ml/hr x 1 liter   > On 5/16/2017, patient was given IVF: 0.9%  ml/hr x 1 liter   > On 5/17/2017, patient was given IVF: 0.9%  ml/hr x 1 liter   > Increase oral fluid intake   > Continue IVF: 0.9%  ml/hr x 1 liter    > Difficulty sleeping    > Continue:    > Trazodone 100 mg PO q HS    > Diphenhydramine HCl 25 mg PO q HS    > Urinary urge incontinence   > On 5/17/2017, added Tolterodine 1 mg PO BID   > Continue:    > Tolterodine 1 mg PO BID    > Diphenhydramine HCl 25 mg PO q HS (re: ADR is urinary retention)    > Analgesia   > On 5/12/2017:    > Discontinued Diclofenac 2 grams applied to affected areas 4 times daily     > Started Naproxen 375 mg PO BID with meals   > On 5/15/2017, discontinued Naproxen 375 mg PO BID with meals (re: decrease in CrCl)   > On 5/17/2017:    > Discontinued Norco 5/325 1 tab PO q 4 hr PRN for pain level greater than 4/10    > Added:     > Percocet 5/325 1 tab PO TID (8AM, 12PM, 4PM)     > Percocet 5/325 1 tab PO q 4 hr PRN for pain level greater than 4/10 (from 8PM to 4AM only)   > Continue:    > Acetaminophen 650 mg PO q 4 hr PRN for pain level less than 5/10    > Percocet 5/325 1 tab PO TID (8AM, 12PM, 4PM)    > Percocet 5/325 1 tab PO q 4 hr PRN for pain level greater than 4/10 (from 8PM to 4AM only)        8. Patient's progress in rehabilitation and medical issues discussed with the patient. All questions answered to the best of my ability. Care plan discussed with patient and nurse.       Signed:    Ananya Swain MD    May 18, 2017

## 2017-05-19 LAB
GLUCOSE BLD STRIP.AUTO-MCNC: 144 MG/DL (ref 70–110)
GLUCOSE BLD STRIP.AUTO-MCNC: 162 MG/DL (ref 70–110)
GLUCOSE BLD STRIP.AUTO-MCNC: 169 MG/DL (ref 70–110)
GLUCOSE BLD STRIP.AUTO-MCNC: 181 MG/DL (ref 70–110)

## 2017-05-19 PROCEDURE — 97535 SELF CARE MNGMENT TRAINING: CPT

## 2017-05-19 PROCEDURE — 97116 GAIT TRAINING THERAPY: CPT

## 2017-05-19 PROCEDURE — 74011636637 HC RX REV CODE- 636/637: Performed by: INTERNAL MEDICINE

## 2017-05-19 PROCEDURE — 74011250636 HC RX REV CODE- 250/636: Performed by: INTERNAL MEDICINE

## 2017-05-19 PROCEDURE — 65310000000 HC RM PRIVATE REHAB

## 2017-05-19 PROCEDURE — 74011250637 HC RX REV CODE- 250/637: Performed by: INTERNAL MEDICINE

## 2017-05-19 PROCEDURE — 77030012935 HC DRSG AQUACEL BMS -B

## 2017-05-19 PROCEDURE — 97530 THERAPEUTIC ACTIVITIES: CPT

## 2017-05-19 PROCEDURE — 97110 THERAPEUTIC EXERCISES: CPT

## 2017-05-19 PROCEDURE — 82962 GLUCOSE BLOOD TEST: CPT

## 2017-05-19 RX ORDER — INSULIN GLARGINE 100 [IU]/ML
22 INJECTION, SOLUTION SUBCUTANEOUS
Status: DISCONTINUED | OUTPATIENT
Start: 2017-05-19 | End: 2017-05-22

## 2017-05-19 RX ORDER — INSULIN GLARGINE 100 [IU]/ML
44 INJECTION, SOLUTION SUBCUTANEOUS
Status: DISCONTINUED | OUTPATIENT
Start: 2017-05-20 | End: 2017-05-22

## 2017-05-19 RX ADMIN — DOCUSATE SODIUM 100 MG: 100 CAPSULE, LIQUID FILLED ORAL at 09:25

## 2017-05-19 RX ADMIN — ASPIRIN 81 MG CHEWABLE TABLET 81 MG: 81 TABLET CHEWABLE at 09:25

## 2017-05-19 RX ADMIN — INSULIN LISPRO 2 UNITS: 100 INJECTION, SOLUTION INTRAVENOUS; SUBCUTANEOUS at 12:57

## 2017-05-19 RX ADMIN — LEVOTHYROXINE SODIUM 100 MCG: 100 TABLET ORAL at 06:25

## 2017-05-19 RX ADMIN — TOLTERODINE TARTRATE 1 MG: 1 TABLET, FILM COATED ORAL at 18:00

## 2017-05-19 RX ADMIN — OXYCODONE HYDROCHLORIDE AND ACETAMINOPHEN 1 TABLET: 5; 325 TABLET ORAL at 12:53

## 2017-05-19 RX ADMIN — NEPHROCAP 1 CAPSULE: 1 CAP ORAL at 09:24

## 2017-05-19 RX ADMIN — INSULIN GLARGINE 40 UNITS: 100 INJECTION, SOLUTION SUBCUTANEOUS at 09:36

## 2017-05-19 RX ADMIN — GABAPENTIN 300 MG: 300 CAPSULE ORAL at 18:36

## 2017-05-19 RX ADMIN — TRAZODONE HYDROCHLORIDE 100 MG: 50 TABLET ORAL at 20:26

## 2017-05-19 RX ADMIN — TOLTERODINE TARTRATE 1 MG: 1 TABLET, FILM COATED ORAL at 09:24

## 2017-05-19 RX ADMIN — CHOLECALCIFEROL TAB 25 MCG (1000 UNIT) 1000 UNITS: 25 TAB at 09:24

## 2017-05-19 RX ADMIN — OXYCODONE HYDROCHLORIDE AND ACETAMINOPHEN 1 TABLET: 5; 325 TABLET ORAL at 21:18

## 2017-05-19 RX ADMIN — HEPARIN SODIUM 5000 UNITS: 5000 INJECTION, SOLUTION INTRAVENOUS; SUBCUTANEOUS at 21:10

## 2017-05-19 RX ADMIN — ARIPIPRAZOLE 10 MG: 10 TABLET ORAL at 09:24

## 2017-05-19 RX ADMIN — DIPHENHYDRAMINE HYDROCHLORIDE 25 MG: 25 CAPSULE ORAL at 20:26

## 2017-05-19 RX ADMIN — INSULIN LISPRO 2 UNITS: 100 INJECTION, SOLUTION INTRAVENOUS; SUBCUTANEOUS at 18:35

## 2017-05-19 RX ADMIN — INSULIN GLARGINE 22 UNITS: 100 INJECTION, SOLUTION SUBCUTANEOUS at 20:47

## 2017-05-19 RX ADMIN — SERTRALINE HYDROCHLORIDE 50 MG: 50 TABLET ORAL at 09:25

## 2017-05-19 RX ADMIN — ROSUVASTATIN CALCIUM 5 MG: 5 TABLET ORAL at 20:26

## 2017-05-19 RX ADMIN — Medication 400 MG: at 09:24

## 2017-05-19 RX ADMIN — OXYCODONE HYDROCHLORIDE AND ACETAMINOPHEN 1 TABLET: 5; 325 TABLET ORAL at 08:17

## 2017-05-19 RX ADMIN — FAMOTIDINE 20 MG: 20 TABLET ORAL at 09:25

## 2017-05-19 RX ADMIN — HEPARIN SODIUM 5000 UNITS: 5000 INJECTION, SOLUTION INTRAVENOUS; SUBCUTANEOUS at 14:01

## 2017-05-19 RX ADMIN — INSULIN LISPRO 2 UNITS: 100 INJECTION, SOLUTION INTRAVENOUS; SUBCUTANEOUS at 09:28

## 2017-05-19 RX ADMIN — OXYCODONE HYDROCHLORIDE AND ACETAMINOPHEN 1 TABLET: 5; 325 TABLET ORAL at 16:58

## 2017-05-19 RX ADMIN — Medication 400 MG: at 18:38

## 2017-05-19 RX ADMIN — GABAPENTIN 300 MG: 300 CAPSULE ORAL at 06:23

## 2017-05-19 RX ADMIN — HEPARIN SODIUM 5000 UNITS: 5000 INJECTION, SOLUTION INTRAVENOUS; SUBCUTANEOUS at 06:23

## 2017-05-19 RX ADMIN — DIVALPROEX SODIUM 250 MG: 250 TABLET, DELAYED RELEASE ORAL at 20:25

## 2017-05-19 NOTE — REHAB NOTE
[facility]  Bedside and Verbal shift change report given to ramesh RN (oncoming nurse) by Santosh Haynes, JENNY   (offgoing nurse). Report included the following information SBAR, Kardex, MAR and Recent Results.     Situation:   Code Status: Full Code   Reason for Admission: [facility] Day: 9   Problem List:   Hospital Problems  Date Reviewed: 5/18/2017          Codes Class Noted POA    Acute renal failure superimposed on stage 3 chronic kidney disease ([facility] ICD-10-CM: N17.9, N18.3  ICD-9-CM: 584.9, 585.3  5/15/2017 No        Cellulitis of right forearm ICD-10-CM: L03.113  ICD-9-CM: 682.3  5/4/2017 Yes        Olecranon bursitis of right elbow ICD-10-CM: M70.21  ICD-9-CM: 726.33  5/4/2017 Yes        Contusion of left elbow ICD-10-CM: S50.02XA  ICD-9-CM: 923.11  5/4/2017 Yes        Impaired mobility and ADLs ICD-10-CM: Z74.09  ICD-9-CM: 799.89  5/4/2017 Yes        * (Principal)SIRS (systemic inflammatory response syndrome) ([facility] ICD-10-CM: R65.10  ICD-9-CM: 995.90  5/2/2017 Yes        Right Achilles tendinitis ICD-10-CM: M76.61  ICD-9-CM: 726.71  5/2/2017 Yes        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 congestive heart failure (Chronic) ICD-10-CM: I13.10, N18.3  ICD-9-CM: 404.10, 585.3  Unknown Yes    Overview Signed 4/23/2013 10:02 AM by Jerrod Hardy controlled             Type 2 diabetes with stage 3 chronic kidney disease GFR 30-59 ([facility] (Chronic) ICD-10-CM: E11.22, N18.3  ICD-9-CM: 250.40, 585.3  Unknown Yes              Background:   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 congestive heart failure     Better controlled     Bipolar affective disorder ([facility] 12/5/2012    Cellulitis of right forearm 5/4/2017    Chronic back pain     Chronic obstructive pulmonary disease (COPD) (HCC)     SOB, on paula O2 Recent admission with psychosis     CKD stage G3a/A1, GFR 45-59 and albumin creatinine ratio <30 mg/g 5/11/2017    Urine microalbumin/Creatinine ratio (5/11/2017) = 9 mg/g    Contusion of left elbow 5/4/2017    Depression     Diabetic neuropathy associated with type 2 diabetes mellitus (HCC)     Diastolic dysfunction without heart failure     Stable on diuretics     Falls frequently     Gastroesophageal reflux disease with hiatal hernia     Generalized osteoarthritis of multiple sites     History of acute renal failure 5/31/2013    History of back injury     jumped out of second story window     History of hepatitis C     treated    History of penicillin allergy     Hypothyroidism     Hypoxemia requiring supplemental oxygen 12/29/2014    Intravenous drug user 5/2/2017    Memory difficulty     Mixed connective tissue disease ([facility] 5/10/2017    Obesity, Class I, BMI 30-34.9     Olecranon bursitis of right elbow 5/4/2017    Recurrent genital herpes 5/31/2013    Right Achilles tendinitis 5/2/2017    Sarcoidosis ([facility]     Sickle cell trait ([facility]     Type 2 diabetes with stage 3 chronic kidney disease GFR 30-59 ([facility]     Venous insufficiency     Wears glasses       Patient taking anticoagulants yes    Patient has a defibrillator: no     Assessment:   Changes in Assessment throughout shift: no     Patient has central line: no Reasons if yes: Insertion date: Last dressing date:   Patient has Renae Cath: no Reasons if yes:     Insertion date:     Last Vitals:     Vitals:    05/17/17 1930 05/18/17 0800 05/18/17 1600 05/18/17 1930   BP: 124/75 (!) 157/97 116/73 121/76   Pulse: 79 78 79 75   Resp: 19 20 18 16   Temp: 98 °F (36.7 °C) 97.7 °F (36.5 °C) 98.5 °F (36.9 °C) 97.5 °F (36.4 °C)   SpO2: 94% 94% 96% 95%   Weight:       Height: LMP: 11/25/2012        PAIN    Pain Assessment    Pain Intensity 1: 0 (05/19/17 0000) Pain Intensity 1: 2 (12/29/14 1105)    Pain Location 1: Knee, Ankle Pain Location 1: Abdomen    Pain Intervention(s) 1: Medication (see MAR) Pain Intervention(s) 1: Medication (see MAR)  Patient Stated Pain Goal: 0 Patient Stated Pain Goal: 0  o Intervention effective: yes   o Other actions taken for pain: repositioning     Skin Assessment  Skin color Skin Color: Appropriate for ethnicity  Condition/Temperature Skin Condition/Temp: Dry, Warm  Integrity Skin Integrity: Wound (add Wound LDA)  Turgor Turgor: Non-tenting  Weekly Pressure Ulcer Documentation  Pressure  Injury Documentation: No Pressure Injury Noted-Pressure Ulcer Prevention Initiated  Wound Prevention & Protection Methods  Orientation of wound Orientation of Wound Prevention: Posterior  Location of Prevention Location of Wound Prevention: Sacrum/Coccyx  Dressing Present Dressing Present : No  Dressing Status    Wound Offloading Wound Offloading (Prevention Methods): Bed, pressure redistribution/air     INTAKE/OUPUT    Date 05/18/17 0700 - 05/19/17 0659 05/19/17 0700 - 05/20/17 0659   Shift 4696-7245 2062-0034 24 Hour Total 0747-4683 7905-7224 24 Hour Total   I  N  T  A  K  E   P.O. 360  360         P. O. 360  360       Shift Total  (mL/kg) 360  (3.8)  360  (3.8)      O  U  T  P  U  T   Urine  (mL/kg/hr)            Urine Occurrence(s) 4 x 3 x 7 x       Stool            Stool Occurrence(s) 0 x 0 x 0 x       Shift Total  (mL/kg)           360      Weight (kg) 95.3 95.3 95.3 95.3 95.3 95.3       Recommendations:  1. Patient needs and requests: none    2. Diet: diabetic    3. Pending tests/procedures: am labs     4. Functional Level/Equipment: assist x 2/wheelchair    5.  Estimated Discharge Date: tbd Posted on Whiteboard in Patients Room: no     HEALS Safety Check    A safety check occurred in the patient's room between off going nurse and oncoming nurse listed above.    The safety check included the below items  Area Items   H  High Alert Medications - Verify all high alert medication drips (heparin, PCA, etc.)   E  Equipment - Suction is set up for ALL patients (with lashay)  - Red plugs utilized for all equipment (IV pumps, etc.)  - WOWs wiped down at end of shift.  - Room stocked with oxygen, suction, and other unit-specific supplies   A  Alarms - Bed alarm is set for fall risk patients  - Ensure chair alarm is in place and activated if patient is up in a chair   L  Lines - Check IV for any infiltration  - Renae bag is empty if patient has a Renae   - Tubing and IV bags are labeled   S  Safety   - Room is clean, patient is clean, and equipment is clean. - Hallways are clear from equipment besides carts. - Fall bracelet on for fall risk patients  - Ensure room is clear and free of clutter  - Suction is set up for ALL patients (with lashay)  - Hallways are clear from equipment besides carts.    - Isolation precautions followed, supplies available outside room, sign posted   Atif Villarreal RN

## 2017-05-19 NOTE — PROGRESS NOTES
[facility] PHYSICAL REHABILITATION  [address] [place] [address], [address]     INPATIENT REHABILITATION  DAILY PROGRESS NOTE     Date: 5/19/2017    Name: Ju Gonzalez Age / Sex: 62 y.o. / female   CSN: [id number] MRN: [id number]   [facility] [place] Length of Stay: 9 days     Primary Rehab Diagnosis: Impaired Mobility and ADLs secondary to Systemic inflammatory response syndrome (SIRS) secondary to:  1. Cellulitis of the right forearm  2. Olecranon bursitis of the right elbow  3. Contusion of left elbow  4. Right Achilles tendonitis      Subjective:     Patient seen and examined. Blood pressure controlled. Blood sugars better controlled. Patient's Complaint:   No significant medical complaints     Pain Control: ongoing significant pain in which is stable and controlled by current meds      Objective:     Vital Signs:  Patient Vitals for the past 24 hrs:   BP Temp Pulse Resp SpO2   05/19/17 0754 114/73 98.6 °F (37 °C) 85 19 92 %   05/18/17 1930 121/76 97.5 °F (36.4 °C) 75 16 95 %   05/18/17 1600 116/73 98.5 °F (36.9 °C) 79 18 96 %        Physical Examination:  GENERAL SURVEY: Patient is awake, alert, oriented x 3, sitting comfortably on the chair, not in acute respiratory distress. HEENT: pink palpebral conjunctivae, anicteric sclerae, no nasoaural discharge, moist oral mucosa  NECK: supple, no jugular venous distention, no palpable lymph nodes  CHEST/LUNGS: symmetrical chest expansion, good air entry, clear breath sounds  HEART: adynamic precordium, good S1 S2, no S3, regular rhythm, no murmurs  ABDOMEN: obese, bowel sounds appreciated, soft, non-tender  EXTREMITIES: (+) left hand wrapped in gauze, pink nailbeds, (+) warmth/erythema/swelling of both elbows, (+) bipedal edema, full and equal pulses, no calf tenderness   NEUROLOGICAL EXAM: The patient is awake, alert and oriented x3, able to answer questions fairly appropriately, able to follow 1 and 2 step commands.  Able to tell time from the wall clock. Cranial nerves II-XII are grossly intact. No gross sensory deficit. Motor strength is 2+/5 on both shoulders, 3/5 on the right elbow, 3-/5 on the left elbow, 2+/5 on both wrists, 3+/5 on the right handgrip, 3/5 on the left handgrip, 3-/5 on the right hip, 3/5 on the left hip, 3-/5 on both knees and both ankles.       Current Medications:  Current Facility-Administered Medications   Medication Dose Route Frequency    oxyCODONE-acetaminophen (PERCOCET) 5-325 mg per tablet 1 Tab  1 Tab Oral TID    oxyCODONE-acetaminophen (PERCOCET) 5-325 mg per tablet 1 Tab  1 Tab Oral Q4H PRN    insulin glargine (LANTUS) injection 20 Units  20 Units SubCUTAneous QHS    insulin glargine (LANTUS) injection 40 Units  40 Units SubCUTAneous ACB    tolterodine (DETROL) tablet 1 mg  1 mg Oral BID    famotidine (PEPCID) tablet 20 mg  20 mg Oral DAILY    gabapentin (NEURONTIN) capsule 300 mg  300 mg Oral Q12H    diphenhydrAMINE (BENADRYL) capsule 25 mg  25 mg Oral QHS    magnesium oxide (MAG-OX) tablet 400 mg  400 mg Oral BID    cholecalciferol (VITAMIN D3) tablet 1,000 Units  1,000 Units Oral DAILY    acetaminophen (TYLENOL) tablet 650 mg  650 mg Oral Q4H PRN    docusate sodium (COLACE) capsule 100 mg  100 mg Oral BID    bisacodyl (DULCOLAX) tablet 10 mg  10 mg Oral Q48H PRN    heparin (porcine) injection 5,000 Units  5,000 Units SubCUTAneous Q8H    insulin lispro (HUMALOG) injection   SubCUTAneous TIDAC    albuterol (PROVENTIL VENTOLIN) nebulizer solution 2.5 mg  2.5 mg Nebulization Q4H PRN    divalproex DR (DEPAKOTE) tablet 250 mg  250 mg Oral QHS    ARIPiprazole (ABILIFY) tablet 10 mg  10 mg Oral DAILY    traZODone (DESYREL) tablet 100 mg  100 mg Oral QHS    rosuvastatin (CRESTOR) tablet 5 mg  5 mg Oral QHS    aspirin chewable tablet 81 mg  81 mg Oral DAILY WITH BREAKFAST    levothyroxine (SYNTHROID) tablet 100 mcg  100 mcg Oral ACB    sertraline (ZOLOFT) tablet 50 mg  50 mg Oral DAILY    umeclidinium-vilanterol (ANORO ELLIPTA) 62.5 mcg- 25 mcg/inhalation  1 Puff Inhalation QAM RT    B complex-vitaminC-folic acid (NEPHROCAP) cap  1 Cap Oral DAILY       Allergies: Allergies   Allergen Reactions    Moxifloxacin Rash    Pcn [Penicillins] Swelling    Sulfa (Sulfonamide Antibiotics) Swelling       Lab/Data Review:  Recent Results (from the past 24 hour(s))   GLUCOSE, POC    Collection Time: 05/18/17  4:55 PM   Result Value Ref Range    Glucose (POC) 184 (H) 70 - 110 mg/dL   GLUCOSE, POC    Collection Time: 05/18/17  8:56 PM   Result Value Ref Range    Glucose (POC) 158 (H) 70 - 110 mg/dL   GLUCOSE, POC    Collection Time: 05/19/17  7:45 AM   Result Value Ref Range    Glucose (POC) 162 (H) 70 - 110 mg/dL   GLUCOSE, POC    Collection Time: 05/19/17 11:07 AM   Result Value Ref Range    Glucose (POC) 169 (H) 70 - 110 mg/dL       Estimated Glomerular Filtration Rate:  CKD-EPI:   On admission, estimated GFR was 53.6 mL/min/1.73m2 based on a Creatinine of 1.26 mg/dl. Most recent estimated GFR was 40.2 mL/min/1.73m2 based on a Creatinine of 1.60 mg/dl. MDRD:   On admission, estimated GFR was 55.7 mL/min/1.73m2 based on a Creatinine of 1.26 mg/dl. Most recent estimated GFR was 42.3 mL/min/1.73m2 based on a Creatinine of 1.60 mg/dl. Assessment:     Primary Rehabilitation Diagnosis  1. Impaired Mobility and ADLs  2. Systemic inflammatory response syndrome (SIRS) secondary to:   a. Cellulitis of the right forearm   b. Olecranon bursitis of the right elbow   c. Contusion of left elbow   d.  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 heart failure    Depression    History of back injury    Anxiety    Wears glasses    History of hepatitis C    Sickle cell trait (HCC)    History of acute renal failure    Hypothyroidism    Recurrent genital herpes    Sarcoidosis ([facility] [address]    History pf abscess of right arm    Hypoxemia requiring supplemental oxygen    Abnormal nuclear stress test    History of cellulitis and abscess of hand    History of cellulitis and abscess of foot    Intravenous drug user    History of penicillin allergy    Falls frequently    Gastroesophageal reflux disease with hiatal hernia    Memory difficulty    Mixed connective tissue disease     CKD stage G3a/A1, GFR 45-59 and albumin creatinine ratio <30 mg/g     Acute renal failure superimposed on stage 3 chronic kidney disease       Plan:     1. Justification for continued stay: Good progression towards established rehabilitation goals. 2. Medical Issues being followed closely:    [x]  Fall and safety precautions     [x]  Wound Care     [x]  Bowel and Bladder Function     [x]  Fluid Electrolyte and Nutrition Balance     [x]  Pain Control      3. Issues that 24 hour rehabilitation nursing is following:    [x]  Fall and safety precautions     [x]  Wound Care     [x]  Bowel and Bladder Function     [x]  Fluid Electrolyte and Nutrition Balance     [x]  Pain Control      [x]  Assistance with and education on in-room safety with transfers to and from the bed, wheelchair, toilet and shower. 4. Acute rehabilitation plan of care:    [x]  Continue current care and rehab. [x]  Physical Therapy           [x]  Occupational Therapy           []  Speech Therapy     []  Hold Rehab until further notice     5.  Medications:    [x]  MAR Reviewed [x]  Continue Present Medications     6. DVT Prophylaxis:      []  Lovenox     [x]  Unfractionated Heparin     []  Coumadin     []  NOAC     [x]  ROBERT Stockings     []  Sequential Compression Device     []  None     7.  Orders:   > Systemic inflammatory response syndrome (SIRS) secondary to Cellulitis of the right forearm, Olecranon bursitis of the right elbow, Contusion of left elbow and Right Achilles tendonitis    > Patient had completed the recommended treatment course of oral Clindamycin on 5/13/2017     > Abnormally low HDL with hypertriglyceridemia   > Continue Rosuvastatin 5 mg PO q HS     > 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 congestive heart failure    > On 5/11/2017, discontinued Furosemide 20 mg PO once daily (re: rising Creatinine)     > Bipolar affective disease   > Continue:    > Aripiprazole 10 mg PO once daily    > Divalproex  mg PO q HS     > Chronic obstructive pulmonary disease   > Continue:    > Anoro Ellipta 1 puff inhaled once daily    > Albuterol nebulization q 4 hr PRN for shortness of breath     > Depression / Anxiety   > Continue:    > Aripiprazole 10 mg PO once daily    > Sertraline 50 mg PO once daily    > Trazodone 100 mg PO q HS     > Diabetic neuropathy   > On 5/15/2017, decreased Gabapentin from 300 mg PO q 8 hr to 300 mg PO q 12 hr (re: decrease in CrCl)   > Continue Gabapentin 300 mg PO q 12 hr     > Diastolic dysfunction without heart failure   > On 5/11/2017, discontinued Furosemide 20 mg PO once daily (re: rising Creatinine)   > On 5/14/2017, resumed Eplerenone 25 mg PO once daily    > On 5/15/2017, discontinued Eplerenone 25 mg PO once daily (re: decrease in CrCl)     > Gastroesophageal reflux disease with hiatal hernia   > On 5/15/2017, decreased Famotidine from 20 mg PO BID to 20 mg PO once daily   > Continue Famotidine 20 mg PO once daily     > Hypothyroidism   > Continue Levothyroxine 100 mcg PO once daily before breakfast     > Hypoxemia requiring supplemental oxygen   > Continue O2 inhalation 2 LPM via nasal cannula continuous     > Type 2 diabetes mellitus with diabetic neuropathy and stage 3 chronic kidney disease    > Prior to admission to [facility], the patient claimed she wa snot using Lantus at home; instead, she was using Insulin U500 on a sliding scale basis   > On 5/18/2017, changed Lantus from 65 units SC q HS to 40 units SC q AM and 20 units SC q PM   > Continue:    > Change Lantus from 40 units to 44 units SC q AM    > Change Lantus from 20 units to 22 units SC q PM    > Humalog insulin sliding scale SC TID AC only     > Mg (5/11/2017, on admission) = 1.6   > Patient was started on Mg(OH)2 400 mg PO BID   > Continue Mg(OH)2 400 mg PO BID    > CKD stage G3a/A1, GFR 45-59 and albumin creatinine ratio <30 mg/g     > On admission, based on a Creatinine of 1.26 mg/dl:    Estimated GFR using CKD-EPI = 53.6 mL/min/1.73m2    Estimated GFR using MDRD = 55.7 mL/min/1.73m2    > Urine microalbumin/Creatinine ratio (5/11/2017) = 9 mg/g    > Acute renal failure superimposed on stage 3 chronic kidney disease   > BUN/Creatinine (5/10/2017) = 19/1.33    > BUN/Creatinine (5/11/2017, on admission) = 16/1.26    > On 5/12/2017:    > Discontinued Diclofenac 2 grams applied to affected areas 4 times daily     > Started Naproxen 375 mg PO BID with meals   > On 5/14/2017, resumed Eplerenone 25 mg PO once daily    > BUN/Creatinine (5/15/2017) = 22/1.87    > Urinalysis (5/15/2017) showed SG 1.014, no casts   > On 5/15/2017:    > Discontinued Naproxen 375 mg PO BID with meals    > Discontinued Eplerenone 25 mg PO once daily     > Decreased Gabapentin from 300 mg PO q 8 hr to 300 mg PO q 12 hr     > Decreased Famotidine from 20 mg PO BID to 20 mg PO once daily    > Patient was given IVF: 0.9%  ml/hr x 1 liter   > On 5/16/2017, patient was given IVF: 0.9%  ml/hr x 1 liter   > On 5/17/2017, patient was given IVF: 0.9%  ml/hr x 1 liter   > On 5/18/2017, patient was given IVF: 0.9%  ml/hr x 1 liter   > Increase oral fluid intake    > Difficulty sleeping    > Continue:    > Trazodone 100 mg PO q HS    > Diphenhydramine HCl 25 mg PO q HS    > Urinary urge incontinence   > On 5/17/2017, added Tolterodine 1 mg PO BID   > Continue:    > Tolterodine 1 mg PO BID    > Diphenhydramine HCl 25 mg PO q HS (re: ADR is urinary retention)    > Analgesia   > On 5/12/2017:    > Discontinued Diclofenac 2 grams applied to affected areas 4 times daily     > Started Naproxen 375 mg PO BID with meals   > On 5/15/2017, discontinued Naproxen 375 mg PO BID with meals (re: decrease in CrCl)   > On 5/17/2017:    > Discontinued Norco 5/325 1 tab PO q 4 hr PRN for pain level greater than 4/10    > Added:     > Percocet 5/325 1 tab PO TID (8AM, 12PM, 4PM)     > Percocet 5/325 1 tab PO q 4 hr PRN for pain level greater than 4/10 (from 8PM to 4AM only)   > Continue:    > Acetaminophen 650 mg PO q 4 hr PRN for pain level less than 5/10    > Percocet 5/325 1 tab PO TID (8AM, 12PM, 4PM)    > Percocet 5/325 1 tab PO q 4 hr PRN for pain level greater than 4/10 (from 8PM to 4AM only)        8. Patient's progress in rehabilitation and medical issues discussed with the patient. All questions answered to the best of my ability. Care plan discussed with patient and nurse.       SignedChase Gaucher, MD    May 19, 2017

## 2017-05-19 NOTE — PROGRESS NOTES
Problem: Mobility Impaired (Adult and Pediatric)  Goal: *Acute Goals and Plan of Care (Insert Text)  Physical Therapy Goals  STGs  Initiated 2017, updated 2017, and to be accomplished within 7 day(s) [17]  1. Patient will roll side to side in bed with independence. 2. Patient will perform supine to sit and sit to supine with independence. 3. Patient will transfer from bed to chair and chair to bed with supervision using the least restrictive device. 4. Patient will perform sit to stand with supervision. 5. Patient will propel w/c on level surface for 150 ft with R UE and B LEs with supervision. LTGs  Initiated 2017, updated 2017, and to be accomplished within 2 - 4 weeks [17]  1. Patient will roll side to side in bed with independence. 2. Patient will perform supine to sit and sit to supine in bed with independence. 3. Patient will transfer from bed to chair and chair to bed with distant supervision using the least restrictive device. 4. Patient will perform sit to stand with distant supervision. 5. Patient will ambulate with distant supervision for 150 feet with the least restrictive device. 5. Patient will ascend/descend 3 stairs with bilateral handrail(s) with supervision. PHYSICAL THERAPY DAILY NOTE  Patient Name:Stephen Sinha  Time In: 1130  Time Out: 1230  Patient Seen For: Transfer training;Gait training; Therapeutic exercise (bed mobility)   Time in: 1435  Time out: 1500  Pt seen for: txfr training and exercise  Diagnosis: Right forearm cellulits  SIRS (systemic inflammatory response syndrome) (HCC) SIRS (systemic inflammatory response syndrome) (HCC)  Precautions: fall risk     Subjective: Pt reports B knee soreness and R foot soreness.       Pain at start of tx: not rated   Pain at stop of tx: not rated      Patient identified with name and : yes         Objective: -1 unit in PM due to pt's decreased activity tolerance      BED/MAT MOBILITY Daily Assessment     Supine to Sit : 6 (Modified independent)  Sit to Supine : 6 (Modified independent)   Pt performed bed mobility on R side of mat table Aries using EOM for leverage. TRANSFERS Daily Assessment     Transfer Type: Other  Other: stand step txfr with RW  Transfer Assistance : 4 (Contact guard assistance)  Sit to Stand Assistance: Stand-by assistance Pt performed sit to stand from w/c and mat table repeated trials with SBA and required increased time for adequate anterior wt shift and push off. Pt required v/c to scoot fwd and for improved B feet positioning to improve mechanics of sit to stand. Pt performed stand step txfr w/c<->mat table repeated trials with SBA but occasional minimal CGA for safety and balance when stepping back to surface. In PM, pt required Elida for sit to stand and increased time to scoot and initiate sit to stand, as pt c/o being \"worn out. \"          GAIT Daily Assessment     Amount of Assistance: 4 (Contact guard assistance)  Distance (ft): 165 Feet (ft)  Assistive Device: Walker, rolling   Pt ambulated 165ft with RW and SBA/CGA for safety, with w/c follow, min v/c and t/c to stand erect and remain within base of RW for safety. BALANCE Daily Assessment     Sitting - Static: Good (unsupported)  Sitting - Dynamic: Fair (occasional)  Standing - Static: Fair  Standing - Dynamic : Impaired            LOWER EXTREMITY EXERCISES Daily Assessment      Supine bridging 3x10, BLE SAQ 3x15 and hip abd x15 each for strengthening, activity tolerance and AROM to increase independence with functional mobility. In PM, pt performed standing marching x15 B and mini squats x10 with extensive rest break between exercises. Assessment: Pt continues to progress but requires encouragement to perform sit to stand and scooting without A even with increased time to perform.               Plan of Care: Continue with current POC to improve independence with functional mobility and increase safety awareness.       Alex Cummins, BALDOMERO  5/19/2017

## 2017-05-19 NOTE — REHAB NOTE
SHIFT CH[facility]  Bedside and Verbal shift change report given to Abdulkadir Lipscomb (oncoming nurse) by Phan Shah RN   (offgoing nurse). Report included the following information SBAR, Kardex, MAR and Recent Results.     Situation:   Code Status: Full Code   Reason for Admission: [facility] Day: 9   Problem List:   Hospital Problems  Date Reviewed: 5/19/2017          Codes Class Noted POA    Acute renal failure superimposed on stage 3 chronic kidney disease ([facility] ICD-10-CM: N17.9, N18.3  ICD-9-CM: 584.9, 585.3  5/15/2017 No        Cellulitis of right forearm ICD-10-CM: L03.113  ICD-9-CM: 682.3  5/4/2017 Yes        Olecranon bursitis of right elbow ICD-10-CM: M70.21  ICD-9-CM: 726.33  5/4/2017 Yes        Contusion of left elbow ICD-10-CM: S50.02XA  ICD-9-CM: 923.11  5/4/2017 Yes        Impaired mobility and ADLs ICD-10-CM: Z74.09  ICD-9-CM: 799.89  5/4/2017 Yes        * (Principal)SIRS (systemic inflammatory response syndrome) [facility] ICD-10-CM: R65.10  ICD-9-CM: 995.90  5/2/2017 Yes        Right Achilles tendinitis ICD-10-CM: M76.61  ICD-9-CM: 726.71  5/2/2017 Yes        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 congestive heart failure (Chronic) ICD-10-CM: I13.10, N18.3  ICD-9-CM: 404.10, 585.3  Unknown Yes    Overview Signed 4/23/2013 10:02 AM by Krystal Garcia     Better controlled             Type 2 diabetes with stage 3 chronic kidney disease GFR 30-59 (HCC) (Chronic) ICD-10-CM: E11.22, N18.3  ICD-9-CM: 250.40, 585.3  Unknown Yes              Background:   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 congestive heart failure     Better controlled     Bipolar affective disorder ([facility] 12/5/2012    Cellulitis of right forearm 5/4/2017    Chronic back pain     Chronic obstructive pulmonary disease (COPD) (HCC)     SOB, on paula O2 Recent admission with psychosis     CKD stage G3a/A1, GFR 45-59 and albumin creatinine ratio <30 mg/g 5/11/2017    Urine microalbumin/Creatinine ratio (5/11/2017) = 9 mg/g    Contusion of left elbow 5/4/2017    Depression     Diabetic neuropathy associated with type 2 diabetes mellitus (HCC)     Diastolic dysfunction without heart failure     Stable on diuretics     Falls frequently     Gastroesophageal reflux disease with hiatal hernia     Generalized osteoarthritis of multiple sites     History of acute renal failure 5/31/2013    History of back injury     jumped out of second story window     History of hepatitis C     treated    History of penicillin allergy     Hypothyroidism     Hypoxemia requiring supplemental oxygen 12/29/2014    Intravenous drug user 5/2/2017    Memory difficulty     Mixed connective tissue disease ([facility] 5/10/2017    Obesity, Class I, BMI 30-34.9     Olecranon bursitis of right elbow 5/4/2017    Recurrent genital herpes 5/31/2013    Right Achilles tendinitis 5/2/2017    Sarcoidosis ([facility]     Sickle cell trait ([facility]     Type 2 diabetes with stage 3 chronic kidney disease GFR 30-59 ([facility]     Venous insufficiency     Wears glasses       Patient taking anticoagulants yes    Patient has a defibrillator: no     Assessment:   Changes in Assessment throughout shift: no     Patient has central line: no Reasons if yes: Insertion date: Last dressing date:   Patient has Renae Cath: no Reasons if yes:     Insertion date:     Last Vitals:     Vitals:    05/18/17 1600 05/18/17 1930 05/19/17 0754 05/19/17 1600   BP: 116/73 121/76 114/73 122/75   Pulse: 79 75 85 85   Resp: 18 16 19 18   Temp: 98.5 °F (36.9 °C) 97.5 °F (36.4 °C) 98.6 °F (37 °C) 98.7 °F (37.1 °C)   SpO2: 96% 95% 92% 90%   Weight:       Height: LMP: 11/25/2012        PAIN    Pain Assessment    Pain Intensity 1: 7 (05/19/17 1700) Pain Intensity 1: 2 (12/29/14 1105)    Pain Location 1: Knee Pain Location 1: Abdomen    Pain Intervention(s) 1: Other (comment) Pain Intervention(s) 1: Medication (see MAR)  Patient Stated Pain Goal: 0 Patient Stated Pain Goal: 0  o Intervention effective: yes   o Other actions taken for pain: repositioning     Skin Assessment  Skin color Skin Color: Appropriate for ethnicity  Condition/Temperature Skin Condition/Temp: Dry  Integrity Skin Integrity: Wound (add Wound LDA)  Turgor Turgor: Non-tenting  Weekly Pressure Ulcer Documentation  Pressure  Injury Documentation: No Pressure Injury Noted-Pressure Ulcer Prevention Initiated  Wound Prevention & Protection Methods  Orientation of wound Orientation of Wound Prevention: Posterior  Location of Prevention Location of Wound Prevention: Sacrum/Coccyx  Dressing Present Dressing Present : No  Dressing Status    Wound Offloading Wound Offloading (Prevention Methods): Bed, pressure redistribution/air, Chair cushion, Wheelchair     INTAKE/OUPUT    Date 05/18/17 1900 - 05/19/17 0659 05/19/17 0700 - 05/20/17 0659   Shift 8898-7448 24 Hour Total 3720-1183 2997-4651 24 Hour Total   I  N  T  A  K  E   P.O.  360 720  720      P. O.  360 720  720    Shift Total  (mL/kg)  360  (3.8) 720  (7.6)  720  (7.6)   O  U  T  P  U  T   Urine  (mL/kg/hr)           Urine Occurrence(s) 3 x 7 x 1 x  1 x    Stool           Stool Occurrence(s) 0 x 0 x 0 x  0 x    Shift Total  (mL/kg)        NET  360 720  720   Weight (kg) 95.3 95.3 95.3 95.3 95.3       Recommendations:  1. Patient needs and requests: none    2. Diet: diabetic    3. Pending tests/procedures: am labs     4. Functional Level/Equipment: assist x 2/wheelchair    5.  Estimated Discharge Date: tbd Posted on Whiteboard in Patients Room: no     HEALS Safety Check    A safety check occurred in the patient's room between off going nurse and oncoming nurse listed above. The safety check included the below items  Area Items   H  High Alert Medications - Verify all high alert medication drips (heparin, PCA, etc.)   E  Equipment - Suction is set up for ALL patients (with lashay)  - Red plugs utilized for all equipment (IV pumps, etc.)  - WOWs wiped down at end of shift.  - Room stocked with oxygen, suction, and other unit-specific supplies   A  Alarms - Bed alarm is set for fall risk patients  - Ensure chair alarm is in place and activated if patient is up in a chair   L  Lines - Check IV for any infiltration  - Renae bag is empty if patient has a Renae   - Tubing and IV bags are labeled   S  Safety   - Room is clean, patient is clean, and equipment is clean. - Hallways are clear from equipment besides carts. - Fall bracelet on for fall risk patients  - Ensure room is clear and free of clutter  - Suction is set up for ALL patients (with lashay)  - Hallways are clear from equipment besides carts.    - Isolation precautions followed, supplies available outside room, sign posted   Edmond Hooper RN

## 2017-05-19 NOTE — PROGRESS NOTES
Problem: Self Care Deficits Care Plan (Adult)  Goal: *Acute Goals and Plan of Care (Insert Text)  Long Term Goals (to be met upon discharge date) in order to increase pts functional independence and safety, and decrease burden of care:  1. Pt will perform self-feeding with setup. 2. Pt will perform grooming with setup  3. Pt will perform UB bathing with Min A.  4. Pt will perform LB bathing with Min A.  5. Pt will perform tub/shower transfer with Min A.   6. Pt will perform UB dressing with Min A.  7. Pt will perform LB dressing with Min A.  8. Pt will perform toileting task with Min A.  9. Pt will perform toilet transfer with Min A. Short Term Weekly Goals for (5/18/2017 to 5/25/2017) in order to increase pts functional independence and safety, and decrease burden of care:  1. Pt will perform self-feeding with S.   2. Pt will perform grooming with S.  3. Pt will perform UB bathing with S.  4. Pt will perform LB bathing with S.  5. Pt will perform tub/shower transfer with S.  6. Pt will perform UB dressing with S.  7. Pt will perform LB dressing with min A.  8. Pt will perform toileting task with S.  9. Pt will perform toilet transfer with S.   OCCUPATIONAL THERAPY DAILY NOTE  Patient Name:Stephen Sinha  Time Spent With Patient  Time In: 0700  Time Out: 0730  Time In 1330  Time Out 1430  Medical Diagnosis:  Right forearm cellulits  SIRS (systemic inflammatory response syndrome) (HCC) SIRS (systemic inflammatory response syndrome) (HCC)   Subjective: \" My shoulders hurt. \"     Objective:      THERAPEUTIC EXERCISE Daily Assessment     B UE ex AROM all planes shoulder, elbow, wrist, and hands 10 reps x 2 sets with 10 second hold at end range for a stretch. Issued pt HEP sheet for UE AROM exercises. IADL Daily Assessment     Pt used walker basket , RW, and reacher to obtain washcloths from floor in various locations with CGA and cues for safety, positioning, and body approximation to RW.         Miguel Mendoza Daily Assessment     Grooming  Grooming Assistance : 4 (Minimal assistance)  Comments: Assist to comb back of hair           LOWER BODY DRESSING Daily Assessment     Lower Body Dressing   Dressing Assistance : 3 (Moderate assistance) (pants only )  Leg Crossed Method Used: No  Position Performed: Long sitting on bed;Standing  Adaptive Equipment Used: Reacher  Comments: Pt used reacher with assist and increased time long sitting; pt stood to shante pants over hips with assist in the back of pants and increased time. MOBILITY/TRANSFERS Daily Assessment      bed to w/c CGA with safety cues       Assessment:  Pt demonstrates some self limiting behaviors/ statements throughout tx. Therapist provided education and encouragement.        Plan of Care: Continue with [address] [place] [address], [address] [place] [address]  5/19/2017

## 2017-05-20 LAB
ANION GAP BLD CALC-SCNC: 10 MMOL/L (ref 3–18)
BUN SERPL-MCNC: 16 MG/DL (ref 7–18)
BUN/CREAT SERPL: 10 (ref 12–20)
CALCIUM SERPL-MCNC: 10.7 MG/DL (ref 8.5–10.1)
CHLORIDE SERPL-SCNC: 101 MMOL/L (ref 100–108)
CO2 SERPL-SCNC: 27 MMOL/L (ref 21–32)
CREAT SERPL-MCNC: 1.58 MG/DL (ref 0.6–1.3)
GLUCOSE BLD STRIP.AUTO-MCNC: 116 MG/DL (ref 70–110)
GLUCOSE BLD STRIP.AUTO-MCNC: 183 MG/DL (ref 70–110)
GLUCOSE BLD STRIP.AUTO-MCNC: 211 MG/DL (ref 70–110)
GLUCOSE BLD STRIP.AUTO-MCNC: 214 MG/DL (ref 70–110)
GLUCOSE SERPL-MCNC: 159 MG/DL (ref 74–99)
POTASSIUM SERPL-SCNC: 4.8 MMOL/L (ref 3.5–5.5)
SODIUM SERPL-SCNC: 138 MMOL/L (ref 136–145)

## 2017-05-20 PROCEDURE — 74011250637 HC RX REV CODE- 250/637: Performed by: INTERNAL MEDICINE

## 2017-05-20 PROCEDURE — 80048 BASIC METABOLIC PNL TOTAL CA: CPT | Performed by: INTERNAL MEDICINE

## 2017-05-20 PROCEDURE — 65310000000 HC RM PRIVATE REHAB

## 2017-05-20 PROCEDURE — 82962 GLUCOSE BLOOD TEST: CPT

## 2017-05-20 PROCEDURE — 74011250636 HC RX REV CODE- 250/636: Performed by: INTERNAL MEDICINE

## 2017-05-20 PROCEDURE — 74011636637 HC RX REV CODE- 636/637: Performed by: INTERNAL MEDICINE

## 2017-05-20 PROCEDURE — 36415 COLL VENOUS BLD VENIPUNCTURE: CPT | Performed by: INTERNAL MEDICINE

## 2017-05-20 RX ADMIN — OXYCODONE HYDROCHLORIDE AND ACETAMINOPHEN 1 TABLET: 5; 325 TABLET ORAL at 17:26

## 2017-05-20 RX ADMIN — Medication 400 MG: at 17:26

## 2017-05-20 RX ADMIN — NEPHROCAP 1 CAPSULE: 1 CAP ORAL at 08:55

## 2017-05-20 RX ADMIN — ARIPIPRAZOLE 10 MG: 10 TABLET ORAL at 08:55

## 2017-05-20 RX ADMIN — FAMOTIDINE 20 MG: 20 TABLET ORAL at 08:55

## 2017-05-20 RX ADMIN — Medication 400 MG: at 08:55

## 2017-05-20 RX ADMIN — GABAPENTIN 300 MG: 300 CAPSULE ORAL at 06:17

## 2017-05-20 RX ADMIN — HEPARIN SODIUM 5000 UNITS: 5000 INJECTION, SOLUTION INTRAVENOUS; SUBCUTANEOUS at 13:46

## 2017-05-20 RX ADMIN — OXYCODONE HYDROCHLORIDE AND ACETAMINOPHEN 1 TABLET: 5; 325 TABLET ORAL at 12:14

## 2017-05-20 RX ADMIN — ROSUVASTATIN CALCIUM 5 MG: 5 TABLET ORAL at 20:32

## 2017-05-20 RX ADMIN — OXYCODONE HYDROCHLORIDE AND ACETAMINOPHEN 1 TABLET: 5; 325 TABLET ORAL at 21:31

## 2017-05-20 RX ADMIN — OXYCODONE HYDROCHLORIDE AND ACETAMINOPHEN 1 TABLET: 5; 325 TABLET ORAL at 08:55

## 2017-05-20 RX ADMIN — CHOLECALCIFEROL TAB 25 MCG (1000 UNIT) 1000 UNITS: 25 TAB at 08:55

## 2017-05-20 RX ADMIN — INSULIN LISPRO 4 UNITS: 100 INJECTION, SOLUTION INTRAVENOUS; SUBCUTANEOUS at 17:26

## 2017-05-20 RX ADMIN — DIVALPROEX SODIUM 250 MG: 250 TABLET, DELAYED RELEASE ORAL at 20:32

## 2017-05-20 RX ADMIN — TOLTERODINE TARTRATE 1 MG: 1 TABLET, FILM COATED ORAL at 17:27

## 2017-05-20 RX ADMIN — INSULIN LISPRO 2 UNITS: 100 INJECTION, SOLUTION INTRAVENOUS; SUBCUTANEOUS at 08:57

## 2017-05-20 RX ADMIN — HEPARIN SODIUM 5000 UNITS: 5000 INJECTION, SOLUTION INTRAVENOUS; SUBCUTANEOUS at 06:16

## 2017-05-20 RX ADMIN — INSULIN GLARGINE 22 UNITS: 100 INJECTION, SOLUTION SUBCUTANEOUS at 20:47

## 2017-05-20 RX ADMIN — INSULIN GLARGINE 44 UNITS: 100 INJECTION, SOLUTION SUBCUTANEOUS at 08:58

## 2017-05-20 RX ADMIN — DIPHENHYDRAMINE HYDROCHLORIDE 25 MG: 25 CAPSULE ORAL at 20:32

## 2017-05-20 RX ADMIN — HEPARIN SODIUM 5000 UNITS: 5000 INJECTION, SOLUTION INTRAVENOUS; SUBCUTANEOUS at 21:31

## 2017-05-20 RX ADMIN — TRAZODONE HYDROCHLORIDE 100 MG: 50 TABLET ORAL at 20:32

## 2017-05-20 RX ADMIN — DOCUSATE SODIUM 100 MG: 100 CAPSULE, LIQUID FILLED ORAL at 17:26

## 2017-05-20 RX ADMIN — ASPIRIN 81 MG CHEWABLE TABLET 81 MG: 81 TABLET CHEWABLE at 08:55

## 2017-05-20 RX ADMIN — GABAPENTIN 300 MG: 300 CAPSULE ORAL at 17:26

## 2017-05-20 RX ADMIN — SERTRALINE HYDROCHLORIDE 50 MG: 50 TABLET ORAL at 08:55

## 2017-05-20 RX ADMIN — TOLTERODINE TARTRATE 1 MG: 1 TABLET, FILM COATED ORAL at 08:55

## 2017-05-20 RX ADMIN — LEVOTHYROXINE SODIUM 100 MCG: 100 TABLET ORAL at 06:48

## 2017-05-20 NOTE — ROUTINE PROCESS
4289 assumed care of pt after bedside verbal report was given by off going nurse, pt up in wheel chair, no acute distress noted, will monitor     1259 pt sitting up in wheelchair eating lunch, no changes noted    1629 Bedside and Verbal shift change report given to [facility]  (oncoming nurse) by JENNY Foley (offgoing nurse). Report included the following information SBAR, Kardex and MAR.

## 2017-05-20 NOTE — PROGRESS NOTES
Progress Note    Patient: Chanelle Fields MRN: [id number]  CSN: [id number]    YOB: 1955  Age: 62 y.o. Sex: female    DOA: 5/10/2017 LOS:  LOS: 10 days                    Subjective:     Primary Rehab Diagnosis: Impaired Mobility and ADLs secondary to Systemic inflammatory response syndrome (SIRS) secondary to:  1. Cellulitis of the right forearm  2. Olecranon bursitis of the right elbow  3. Contusion of left elbow  4. Right Achilles tendonitis      Review of systems  General: No fevers or chills. Cardiovascular: No chest pain or pressure. No palpitations. Pulmonary: No shortness of breath, cough or wheeze. Gastrointestinal: No abdominal pain, nausea, vomiting or diarrhea. Genitourinary: No urinary frequency, urgency, hesitancy or dysuria. Musculoskeletal: Rt elbow and forarm pain improved  Neurologic: No headache, numbness, tingling or weakness. Objective:     Physical Exam:  Visit Vitals    /71    Pulse 86    Temp 99.1 °F (37.3 °C)    Resp 18    Ht 5' 5\" (1.651 m)    Wt 95.3 kg (210 lb 1.6 oz)    SpO2 91%    BMI 34.96 kg/m2        General:         Alert, cooperative, no acute distress    HEENT: NC, Atraumatic. PERRLA, anicteric sclerae. Lungs: CTA Bilaterally. No Wheezing/Rhonchi/Rales. Heart:  Regular  rhythm,  No murmur, No Rubs, No Gallops  Abdomen: Soft, Non distended, Non tender.  +Bowel sounds, no HSM  Extremities:B/l lower limb edema  Psych:    Not anxious or agitated. Neurologic:  CN 2-12 grossly intact, Alert and oriented X 3.   No acute neurological                                 Deficits,     Intake and Output:  Current Shift:  05/20 0701 - 05/20 1900  In: 480 [P.O.:480]  Out: -   Last three shifts:  05/18 1901 - 05/20 0700  In: 720 [P.O.:720]  Out: -     Labs: Results:       Chemistry Recent Labs      05/20/17   0700  05/18/17   0633   GLU  159*  140*   NA  138  141   K  4.8  5.2   CL  101  103   CO2  27  32   BUN  16  15   CREA  1.58*  1.60*   CA  10.7* 10. 0   AGAP  10  6   BUCR  10*  9*      CBC w/Diff Recent Labs      05/18/17   0633   HGB  9.8*   HCT  30.7*   PLT  213      Cardiac Enzymes No results for input(s): CPK, CKND1, SANTOS in the last 72 hours. No lab exists for component: CKRMB, TROIP   Coagulation No results for input(s): PTP, INR, APTT in the last 72 hours. No lab exists for component: INREXT    Lipid Panel Lab Results   Component Value Date/Time    Cholesterol, total 134 11/06/2016 04:18 AM    HDL Cholesterol 38 11/06/2016 04:18 AM    LDL, calculated 37 11/06/2016 04:18 AM    VLDL, calculated 59 11/06/2016 04:18 AM    Triglyceride 295 11/06/2016 04:18 AM    CHOL/HDL Ratio 3.5 11/06/2016 04:18 AM      BNP No results for input(s): BNPP in the last 72 hours. Liver Enzymes No results for input(s): TP, ALB, TBIL, AP, SGOT, GPT in the last 72 hours.     No lab exists for component: DBIL   Thyroid Studies Lab Results   Component Value Date/Time    TSH 3.49 05/09/2017 03:25 AM          Procedures/imaging: see electronic medical records for all procedures/Xrays and details which were not copied into this note but were reviewed prior to creation of Plan    Medications:   Current Facility-Administered Medications   Medication Dose Route Frequency    insulin glargine (LANTUS) injection 44 Units  44 Units SubCUTAneous ACB    insulin glargine (LANTUS) injection 22 Units  22 Units SubCUTAneous QHS    oxyCODONE-acetaminophen (PERCOCET) 5-325 mg per tablet 1 Tab  1 Tab Oral TID    oxyCODONE-acetaminophen (PERCOCET) 5-325 mg per tablet 1 Tab  1 Tab Oral Q4H PRN    tolterodine (DETROL) tablet 1 mg  1 mg Oral BID    famotidine (PEPCID) tablet 20 mg  20 mg Oral DAILY    gabapentin (NEURONTIN) capsule 300 mg  300 mg Oral Q12H    diphenhydrAMINE (BENADRYL) capsule 25 mg  25 mg Oral QHS    magnesium oxide (MAG-OX) tablet 400 mg  400 mg Oral BID    cholecalciferol (VITAMIN D3) tablet 1,000 Units  1,000 Units Oral DAILY    acetaminophen (TYLENOL) tablet 650 mg  650 mg Oral Q4H PRN    docusate sodium (COLACE) capsule 100 mg  100 mg Oral BID    bisacodyl (DULCOLAX) tablet 10 mg  10 mg Oral Q48H PRN    heparin (porcine) injection 5,000 Units  5,000 Units SubCUTAneous Q8H    insulin lispro (HUMALOG) injection   SubCUTAneous TIDAC    albuterol (PROVENTIL VENTOLIN) nebulizer solution 2.5 mg  2.5 mg Nebulization Q4H PRN    divalproex DR (DEPAKOTE) tablet 250 mg  250 mg Oral QHS    ARIPiprazole (ABILIFY) tablet 10 mg  10 mg Oral DAILY    traZODone (DESYREL) tablet 100 mg  100 mg Oral QHS    rosuvastatin (CRESTOR) tablet 5 mg  5 mg Oral QHS    aspirin chewable tablet 81 mg  81 mg Oral DAILY WITH BREAKFAST    levothyroxine (SYNTHROID) tablet 100 mcg  100 mcg Oral ACB    sertraline (ZOLOFT) tablet 50 mg  50 mg Oral DAILY    umeclidinium-vilanterol (ANORO ELLIPTA) 62.5 mcg- 25 mcg/inhalation  1 Puff Inhalation QAM RT    B complex-vitaminC-folic acid (NEPHROCAP) cap  1 Cap Oral DAILY       Assessment/Plan     Principal Problem:    SIRS (systemic inflammatory response syndrome) ([facility]) (5/2/2017)    Active Problems:    Type 2 diabetes with stage 3 chronic kidney disease GFR 30-59 ([facility]) ()      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 congestive heart failure ()      Overview: Better controlled      Cellulitis of right forearm (5/4/2017)      Olecranon bursitis of right elbow (5/4/2017)      Contusion of left elbow (5/4/2017)      Right Achilles tendinitis (5/2/2017)      Impaired mobility and ADLs (5/4/2017)      Acute renal failure superimposed on stage 3 chronic kidney disease (HCC) (5/15/2017)      Plan   Systemic inflammatory response syndrome (SIRS) secondary to Cellulitis of the right forearm, Olecranon bursitis of the right elbow, Contusion of left elbow and Right Achilles tendonitis   Patient had completed the recommended treatment course of oral Clindamycin on 5/13/2017       Abnormally low HDL with hypertriglyceridemia  Continue Rosuvastatin 5 mg PO q HS       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 congestive heart failure   Pt off  Furosemide 20 mg PO once daily (re: rising Creatinine)      Bipolar affective disease  Aripiprazole 10 mg PO once daily   Divalproex  mg PO q HS       Chronic obstructive pulmonary disease  Continue:   Anoro Ellipta 1 puff inhaled once daily  Albuterol nebulization q 4 hr PRN for shortness of breath      Depression / Anxiety   Continue:Aripiprazole 10 mg PO once daily   Sertraline 50 mg PO once daily  Trazodone 100 mg PO q HS       Diabetic neuropathy   Continue Gabapentin 300 mg PO q 12 hr       Diastolic dysfunction without heart failure   off lasix   Continue to monitor sx       Gastroesophageal reflux disease with hiatal hernia   Continue Famotidine 20 mg PO once daily       Hypothyroidism   Continue Levothyroxine 100 mcg PO once daily before breakfast       Hypoxemia requiring supplemental oxygen   Continue O2 inhalation 2 LPM via nasal cannula continuous       Type 2 diabetes mellitus with diabetic neuropathy and stage 3 chronic kidney disease    Continue: Change Lantus  44 units SC q AM   Change Lantus 22 units SC q PM   Humalog insulin sliding scale SC TID AC only     Continue Mg(OH)2 400 mg PO BID         Acute renal failure superimposed on stage 3 chronic kidney disease  Increase  oral fluid intake     Difficulty sleeping    Continue:  Trazodone 100 mg PO q HS Diphenhydramine HCl 25 mg PO q HS      Urinary urge incontinence  Continue: Tolterodine 1 mg PO BID  Diphenhydramine HCl 25 mg PO q HS (re: ADR is urinary retention)     Analgesia   Acetaminophen 650 mg PO q 4 hr PRN for pain level less than 5/10  Percocet 5/325 1 tab PO q 4 hr PRN for pain level greater than 4/10 (from 8PM to 4AM only)      Cbc, cmp in AM    Brian Menon MD  5/20/2017 4:14 PM

## 2017-05-20 NOTE — REHAB NOTE
SHIFT CHANGE NOTE FOR [facility]  Bedside and Verbal shift change report given Kimberlee Michael, RN (oncoming nurse) by Boris Taylor RN   (offgoing nurse). Report included the following information SBAR, Kardex, MAR and Recent Results.     Situation:   Code Status: Full Code   Reason for Admission: [address] [facility] Day: 10   Problem List:   Hospital Problems  Date Reviewed: 5/19/2017          Codes Class Noted POA    Acute renal failure superimposed on stage 3 chronic kidney disease ([facility] ICD-10-CM: N17.9, N18.3  ICD-9-CM: 584.9, 585.3  5/15/2017 No        Cellulitis of right forearm ICD-10-CM: L03.113  ICD-9-CM: 682.3  5/4/2017 Yes        Olecranon bursitis of right elbow ICD-10-CM: M70.21  ICD-9-CM: 726.33  5/4/2017 Yes        Contusion of left elbow ICD-10-CM: S50.02XA  ICD-9-CM: 923.11  5/4/2017 Yes        Impaired mobility and ADLs ICD-10-CM: Z74.09  ICD-9-CM: 799.89  5/4/2017 Yes        * (Principal)SIRS (systemic inflammatory response syndrome) ([facility] ICD-10-CM: R65.10  ICD-9-CM: 995.90  5/2/2017 Yes        Right Achilles tendinitis ICD-10-CM: M76.61  ICD-9-CM: 726.71  5/2/2017 Yes        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 congestive heart failure (Chronic) ICD-10-CM: I13.10, N18.3  ICD-9-CM: 404.10, 585.3  Unknown Yes    Overview Signed 4/23/2013 10:02 AM by Lizbet Ozuna     Better controlled             Type 2 diabetes with stage 3 chronic kidney disease GFR 30-59 (HCC) (Chronic) ICD-10-CM: E11.22, N18.3  ICD-9-CM: 250.40, 585.3  Unknown Yes              Background:   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 congestive heart failure     Better controlled     Bipolar affective disorder ([facility] 12/5/2012    Cellulitis of right forearm 5/4/2017    Chronic back pain     Chronic obstructive pulmonary disease (COPD) (HCC)     SOB, on paula O2 Recent admission with psychosis     CKD stage G3a/A1, GFR 45-59 and albumin creatinine ratio <30 mg/g 5/11/2017    Urine microalbumin/Creatinine ratio (5/11/2017) = 9 mg/g    Contusion of left elbow 5/4/2017    Depression     Diabetic neuropathy associated with type 2 diabetes mellitus (HCC)     Diastolic dysfunction without heart failure     Stable on diuretics     Falls frequently     Gastroesophageal reflux disease with hiatal hernia     Generalized osteoarthritis of multiple sites     History of acute renal failure 5/31/2013    History of back injury     jumped out of second story window     History of hepatitis C     treated    History of penicillin allergy     Hypothyroidism     Hypoxemia requiring supplemental oxygen 12/29/2014    Intravenous drug user 5/2/2017    Memory difficulty     Mixed connective tissue disease ([facility] 5/10/2017    Obesity, Class I, BMI 30-34.9     Olecranon bursitis of right elbow 5/4/2017    Recurrent genital herpes 5/31/2013    Right Achilles tendinitis 5/2/2017    Sarcoidosis ([facility]     Sickle cell trait ([facility]     Type 2 diabetes with stage 3 chronic kidney disease GFR 30-59 ([facility]     Venous insufficiency     Wears glasses       Patient taking anticoagulants yes    Patient has a defibrillator: no     Assessment:   Changes in Assessment throughout shift: no     Patient has central line: no Reasons if yes: Insertion date: Last dressing date:   Patient has Renae Cath: no Reasons if yes:     Insertion date:     Last Vitals:     Vitals:    05/18/17 1930 05/19/17 0754 05/19/17 1600 05/19/17 2010   BP: 121/76 114/73 122/75 122/76   Pulse: 75 85 85 82   Resp: 16 19 18 19   Temp: 97.5 °F (36.4 °C) 98.6 °F (37 °C) 98.7 °F (37.1 °C) 97.9 °F (36.6 °C)   SpO2: 95% 92% 90% 99%   Weight: Height:       LMP: 11/25/2012        PAIN    Pain Assessment    Pain Intensity 1: 0 (05/20/17 0400) Pain Intensity 1: 2 (12/29/14 1105)    Pain Location 1: Knee Pain Location 1: Abdomen    Pain Intervention(s) 1: Medication (see MAR) Pain Intervention(s) 1: Medication (see MAR)  Patient Stated Pain Goal: 0 Patient Stated Pain Goal: 0  o Intervention effective: yes   o Other actions taken for pain: repositioning     Skin Assessment  Skin color Skin Color: Appropriate for ethnicity  Condition/Temperature Skin Condition/Temp: Dry  Integrity Skin Integrity: Wound (add Wound LDA)  Turgor Turgor: Non-tenting  Weekly Pressure Ulcer Documentation  Pressure  Injury Documentation: No Pressure Injury Noted-Pressure Ulcer Prevention Initiated  Wound Prevention & Protection Methods  Orientation of wound Orientation of Wound Prevention: Posterior  Location of Prevention Location of Wound Prevention: Sacrum/Coccyx  Dressing Present Dressing Present : No  Dressing Status    Wound Offloading Wound Offloading (Prevention Methods): Bed, pressure redistribution/air     INTAKE/OUPUT    Date 05/19/17 0700 - 05/20/17 0659 05/20/17 0700 - 05/21/17 0659   Shift 1440-4777 0227-9855 24 Hour Total 2896-1645 2267-9384 24 Hour Total   I  N  T  A  K  E   P. O. 720  720         P. O. 720  720       Shift Total  (mL/kg) 720  (7.6)  720  (7.6)      O  U  T  P  U  T   Urine  (mL/kg/hr)            Urine Occurrence(s) 1 x 3 x 4 x       Stool            Stool Occurrence(s) 0 x 0 x 0 x       Shift Total  (mL/kg)           720      Weight (kg) 95.3 95.3 95.3 95.3 95.3 95.3       Recommendations:  1. Patient needs and requests: none    2. Diet: diabetic    3. Pending tests/procedures: am labs     4. Functional Level/Equipment: assist x 2/wheelchair    5.  Estimated Discharge Date: tbd Posted on Whiteboard in Patients Room: no     HEALS Safety Check    A safety check occurred in the patient's room between off going nurse and oncoming nurse listed above.    The safety check included the below items  Area Items   H  High Alert Medications - Verify all high alert medication drips (heparin, PCA, etc.)   E  Equipment - Suction is set up for ALL patients (with lashay)  - Red plugs utilized for all equipment (IV pumps, etc.)  - WOWs wiped down at end of shift.  - Room stocked with oxygen, suction, and other unit-specific supplies   A  Alarms - Bed alarm is set for fall risk patients  - Ensure chair alarm is in place and activated if patient is up in a chair   L  Lines - Check IV for any infiltration  - Renae bag is empty if patient has a Renae   - Tubing and IV bags are labeled   S  Safety   - Room is clean, patient is clean, and equipment is clean. - Hallways are clear from equipment besides carts. - Fall bracelet on for fall risk patients  - Ensure room is clear and free of clutter  - Suction is set up for ALL patients (with lashay)  - Hallways are clear from equipment besides carts.    - Isolation precautions followed, supplies available outside room, sign posted   Armando Suárez RN

## 2017-05-21 LAB
ALBUMIN SERPL BCP-MCNC: 2.6 G/DL (ref 3.4–5)
ALBUMIN/GLOB SERPL: 0.6 {RATIO} (ref 0.8–1.7)
ALP SERPL-CCNC: 54 U/L (ref 45–117)
ALT SERPL-CCNC: 30 U/L (ref 13–56)
ANION GAP BLD CALC-SCNC: 9 MMOL/L (ref 3–18)
AST SERPL W P-5'-P-CCNC: 44 U/L (ref 15–37)
BASOPHILS # BLD AUTO: 0 K/UL (ref 0–0.1)
BASOPHILS # BLD: 0 % (ref 0–2)
BILIRUB SERPL-MCNC: 0.4 MG/DL (ref 0.2–1)
BUN SERPL-MCNC: 17 MG/DL (ref 7–18)
BUN/CREAT SERPL: 11 (ref 12–20)
CALCIUM SERPL-MCNC: 10.5 MG/DL (ref 8.5–10.1)
CHLORIDE SERPL-SCNC: 98 MMOL/L (ref 100–108)
CO2 SERPL-SCNC: 31 MMOL/L (ref 21–32)
CREAT SERPL-MCNC: 1.58 MG/DL (ref 0.6–1.3)
DIFFERENTIAL METHOD BLD: ABNORMAL
EOSINOPHIL # BLD: 0.3 K/UL (ref 0–0.4)
EOSINOPHIL NFR BLD: 4 % (ref 0–5)
ERYTHROCYTE [DISTWIDTH] IN BLOOD BY AUTOMATED COUNT: 15.1 % (ref 11.6–14.5)
GLOBULIN SER CALC-MCNC: 4.6 G/DL (ref 2–4)
GLUCOSE BLD STRIP.AUTO-MCNC: 184 MG/DL (ref 70–110)
GLUCOSE BLD STRIP.AUTO-MCNC: 208 MG/DL (ref 70–110)
GLUCOSE BLD STRIP.AUTO-MCNC: 218 MG/DL (ref 70–110)
GLUCOSE BLD STRIP.AUTO-MCNC: 302 MG/DL (ref 70–110)
GLUCOSE SERPL-MCNC: 184 MG/DL (ref 74–99)
HCT VFR BLD AUTO: 32.2 % (ref 35–45)
HGB BLD-MCNC: 10.5 G/DL (ref 12–16)
LYMPHOCYTES # BLD AUTO: 31 % (ref 21–52)
LYMPHOCYTES # BLD: 2.2 K/UL (ref 0.9–3.6)
MCH RBC QN AUTO: 27.1 PG (ref 24–34)
MCHC RBC AUTO-ENTMCNC: 32.6 G/DL (ref 31–37)
MCV RBC AUTO: 83 FL (ref 74–97)
MONOCYTES # BLD: 0.5 K/UL (ref 0.05–1.2)
MONOCYTES NFR BLD AUTO: 7 % (ref 3–10)
NEUTS SEG # BLD: 4.1 K/UL (ref 1.8–8)
NEUTS SEG NFR BLD AUTO: 58 % (ref 40–73)
PLATELET # BLD AUTO: 197 K/UL (ref 135–420)
PMV BLD AUTO: 9.5 FL (ref 9.2–11.8)
POTASSIUM SERPL-SCNC: 4.3 MMOL/L (ref 3.5–5.5)
PROT SERPL-MCNC: 7.2 G/DL (ref 6.4–8.2)
RBC # BLD AUTO: 3.88 M/UL (ref 4.2–5.3)
SODIUM SERPL-SCNC: 138 MMOL/L (ref 136–145)
WBC # BLD AUTO: 7.1 K/UL (ref 4.6–13.2)

## 2017-05-21 PROCEDURE — 65310000000 HC RM PRIVATE REHAB

## 2017-05-21 PROCEDURE — 82962 GLUCOSE BLOOD TEST: CPT

## 2017-05-21 PROCEDURE — 36415 COLL VENOUS BLD VENIPUNCTURE: CPT | Performed by: FAMILY MEDICINE

## 2017-05-21 PROCEDURE — 85025 COMPLETE CBC W/AUTO DIFF WBC: CPT | Performed by: FAMILY MEDICINE

## 2017-05-21 PROCEDURE — 74011250637 HC RX REV CODE- 250/637: Performed by: INTERNAL MEDICINE

## 2017-05-21 PROCEDURE — 80053 COMPREHEN METABOLIC PANEL: CPT | Performed by: FAMILY MEDICINE

## 2017-05-21 PROCEDURE — 74011250636 HC RX REV CODE- 250/636: Performed by: INTERNAL MEDICINE

## 2017-05-21 PROCEDURE — 74011636637 HC RX REV CODE- 636/637: Performed by: INTERNAL MEDICINE

## 2017-05-21 RX ADMIN — CHOLECALCIFEROL TAB 25 MCG (1000 UNIT) 1000 UNITS: 25 TAB at 08:52

## 2017-05-21 RX ADMIN — OXYCODONE HYDROCHLORIDE AND ACETAMINOPHEN 1 TABLET: 5; 325 TABLET ORAL at 11:55

## 2017-05-21 RX ADMIN — OXYCODONE HYDROCHLORIDE AND ACETAMINOPHEN 1 TABLET: 5; 325 TABLET ORAL at 20:27

## 2017-05-21 RX ADMIN — OXYCODONE HYDROCHLORIDE AND ACETAMINOPHEN 1 TABLET: 5; 325 TABLET ORAL at 08:51

## 2017-05-21 RX ADMIN — HEPARIN SODIUM 5000 UNITS: 5000 INJECTION, SOLUTION INTRAVENOUS; SUBCUTANEOUS at 05:52

## 2017-05-21 RX ADMIN — DIPHENHYDRAMINE HYDROCHLORIDE 25 MG: 25 CAPSULE ORAL at 21:14

## 2017-05-21 RX ADMIN — GABAPENTIN 300 MG: 300 CAPSULE ORAL at 05:51

## 2017-05-21 RX ADMIN — OXYCODONE HYDROCHLORIDE AND ACETAMINOPHEN 1 TABLET: 5; 325 TABLET ORAL at 16:21

## 2017-05-21 RX ADMIN — INSULIN GLARGINE 44 UNITS: 100 INJECTION, SOLUTION SUBCUTANEOUS at 08:53

## 2017-05-21 RX ADMIN — Medication 400 MG: at 17:51

## 2017-05-21 RX ADMIN — INSULIN LISPRO 8 UNITS: 100 INJECTION, SOLUTION INTRAVENOUS; SUBCUTANEOUS at 12:00

## 2017-05-21 RX ADMIN — HEPARIN SODIUM 5000 UNITS: 5000 INJECTION, SOLUTION INTRAVENOUS; SUBCUTANEOUS at 14:59

## 2017-05-21 RX ADMIN — NEPHROCAP 1 CAPSULE: 1 CAP ORAL at 08:51

## 2017-05-21 RX ADMIN — FAMOTIDINE 20 MG: 20 TABLET ORAL at 08:51

## 2017-05-21 RX ADMIN — INSULIN GLARGINE 22 UNITS: 100 INJECTION, SOLUTION SUBCUTANEOUS at 20:31

## 2017-05-21 RX ADMIN — SERTRALINE HYDROCHLORIDE 50 MG: 50 TABLET ORAL at 08:52

## 2017-05-21 RX ADMIN — DOCUSATE SODIUM 100 MG: 100 CAPSULE, LIQUID FILLED ORAL at 17:51

## 2017-05-21 RX ADMIN — TOLTERODINE TARTRATE 1 MG: 1 TABLET, FILM COATED ORAL at 17:51

## 2017-05-21 RX ADMIN — ARIPIPRAZOLE 10 MG: 10 TABLET ORAL at 08:52

## 2017-05-21 RX ADMIN — TOLTERODINE TARTRATE 1 MG: 1 TABLET, FILM COATED ORAL at 08:51

## 2017-05-21 RX ADMIN — DIVALPROEX SODIUM 250 MG: 250 TABLET, DELAYED RELEASE ORAL at 21:14

## 2017-05-21 RX ADMIN — ACETAMINOPHEN 650 MG: 325 TABLET ORAL at 05:50

## 2017-05-21 RX ADMIN — INSULIN LISPRO 4 UNITS: 100 INJECTION, SOLUTION INTRAVENOUS; SUBCUTANEOUS at 17:53

## 2017-05-21 RX ADMIN — Medication 400 MG: at 08:52

## 2017-05-21 RX ADMIN — HEPARIN SODIUM 5000 UNITS: 5000 INJECTION, SOLUTION INTRAVENOUS; SUBCUTANEOUS at 22:00

## 2017-05-21 RX ADMIN — ASPIRIN 81 MG CHEWABLE TABLET 81 MG: 81 TABLET CHEWABLE at 08:52

## 2017-05-21 RX ADMIN — INSULIN LISPRO 2 UNITS: 100 INJECTION, SOLUTION INTRAVENOUS; SUBCUTANEOUS at 08:54

## 2017-05-21 RX ADMIN — GABAPENTIN 300 MG: 300 CAPSULE ORAL at 17:51

## 2017-05-21 RX ADMIN — ROSUVASTATIN CALCIUM 5 MG: 5 TABLET ORAL at 21:14

## 2017-05-21 RX ADMIN — TRAZODONE HYDROCHLORIDE 100 MG: 50 TABLET ORAL at 21:14

## 2017-05-21 RX ADMIN — DOCUSATE SODIUM 100 MG: 100 CAPSULE, LIQUID FILLED ORAL at 08:51

## 2017-05-21 RX ADMIN — LEVOTHYROXINE SODIUM 100 MCG: 100 TABLET ORAL at 06:50

## 2017-05-21 NOTE — REHAB NOTE
[facility]  Bedside and Verbal shift change report given to Ni Galeana LPN (oncoming nurse) by Tammi Ferris RN   (offgoing nurse). Report included the following information SBAR, Kardex, MAR and Recent Results.     Situation:   Code Status: Full Code   Reason for Admission: [facility] Day: 11   Problem List:   Hospital Problems  Date Reviewed: 5/19/2017          Codes Class Noted POA    Acute renal failure superimposed on stage 3 chronic kidney disease ([facility] ICD-10-CM: N17.9, N18.3  ICD-9-CM: 584.9, 585.3  5/15/2017 No        Cellulitis of right forearm ICD-10-CM: L03.113  ICD-9-CM: 682.3  5/4/2017 Yes        Olecranon bursitis of right elbow ICD-10-CM: M70.21  ICD-9-CM: 726.33  5/4/2017 Yes        Contusion of left elbow ICD-10-CM: S50.02XA  ICD-9-CM: 923.11  5/4/2017 Yes        Impaired mobility and ADLs ICD-10-CM: Z74.09  ICD-9-CM: 799.89  5/4/2017 Yes        * (Principal)SIRS (systemic inflammatory response syndrome) ([facility] ICD-10-CM: R65.10  ICD-9-CM: 995.90  5/2/2017 Yes        Right Achilles tendinitis ICD-10-CM: M76.61  ICD-9-CM: 726.71  5/2/2017 Yes        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 congestive heart failure (Chronic) ICD-10-CM: I13.10, N18.3  ICD-9-CM: 404.10, 585.3  Unknown Yes    Overview Signed 4/23/2013 10:02 AM by Mackenzei Barnard     Better controlled             Type 2 diabetes with stage 3 chronic kidney disease GFR 30-59 (HCC) (Chronic) ICD-10-CM: E11.22, N18.3  ICD-9-CM: 250.40, 585.3  Unknown Yes              Background:   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 congestive heart failure     Better controlled     Bipolar affective disorder ([facility] 12/5/2012    Cellulitis of right forearm 5/4/2017    Chronic back pain     Chronic obstructive pulmonary disease (COPD) (HCC)     SOB, on paula O2 Recent admission with psychosis     CKD stage G3a/A1, GFR 45-59 and albumin creatinine ratio <30 mg/g 5/11/2017    Urine microalbumin/Creatinine ratio (5/11/2017) = 9 mg/g    Contusion of left elbow 5/4/2017    Depression     Diabetic neuropathy associated with type 2 diabetes mellitus (HCC)     Diastolic dysfunction without heart failure     Stable on diuretics     Falls frequently     Gastroesophageal reflux disease with hiatal hernia     Generalized osteoarthritis of multiple sites     History of acute renal failure 5/31/2013    History of back injury     jumped out of second story window     History of hepatitis C     treated    History of penicillin allergy     Hypothyroidism     Hypoxemia requiring supplemental oxygen 12/29/2014    Intravenous drug user 5/2/2017    Memory difficulty     Mixed connective tissue disease ([facility] 5/10/2017    Obesity, Class I, BMI 30-34.9     Olecranon bursitis of right elbow 5/4/2017    Recurrent genital herpes 5/31/2013    Right Achilles tendinitis 5/2/2017    Sarcoidosis ([facility]     Sickle cell trait ([facility]     Type 2 diabetes with stage 3 chronic kidney disease GFR 30-59 ([facility]     Venous insufficiency     Wears glasses       Patient taking anticoagulants yes    Patient has a defibrillator: no     Assessment:   Changes in Assessment throughout shift: no     Patient has central line: no Reasons if yes: Insertion date: Last dressing date:   Patient has Renae Cath: no Reasons if yes:     Insertion date:     Last Vitals:     Vitals:    05/19/17 2010 05/20/17 0743 05/20/17 1637 05/20/17 2047   BP: 122/76 119/71 97/68 108/67   Pulse: 82 86 84 85   Resp: 19 18 18 18   Temp: 97.9 °F (36.6 °C) 99.1 °F (37.3 °C) 99.5 °F (37.5 °C) 98.8 °F (37.1 °C)   SpO2: 99% 91% 93%    Weight:       Height:       LMP: 11/25/2012        PAIN    Pain Assessment    Pain Intensity 1: 7 (05/21/17 0625) Pain Intensity 1: 2 (12/29/14 1105)    Pain Location 1: Generalized Pain Location 1: Abdomen    Pain Intervention(s) 1: Medication (see MAR) Pain Intervention(s) 1: Medication (see MAR)  Patient Stated Pain Goal: 0 Patient Stated Pain Goal: 0  o Intervention effective: yes   o Other actions taken for pain: repositioning     Skin Assessment  Skin color Skin Color: Appropriate for ethnicity  Condition/Temperature Skin Condition/Temp: Dry  Integrity Skin Integrity: Incision (comment) (L hand, s/p I&D, kerlex drsg on clean dry intact )  Turgor Turgor: Non-tenting  Weekly Pressure Ulcer Documentation  Pressure  Injury Documentation: No Pressure Injury Noted-Pressure Ulcer Prevention Initiated  Wound Prevention & Protection Methods  Orientation of wound Orientation of Wound Prevention: Posterior  Location of Prevention Location of Wound Prevention: Sacrum/Coccyx  Dressing Present Dressing Present : No  Dressing Status    Wound Offloading Wound Offloading (Prevention Methods): Bed, pressure redistribution/air     INTAKE/OUPUT    Date 05/20/17 0700 - 05/21/17 0659 05/21/17 0700 - 05/22/17 0659   Shift 7382-7324 8279-9888 24 Hour Total 7228-4848 6225-8769 24 Hour Total   I  N  T  A  K  E   P.O. 660  660         P. O. 660  660       Shift Total  (mL/kg) 660  (6.9)  660  (6.9)      O  U  T  P  U  T   Urine  (mL/kg/hr)            Urine Occurrence(s) 2 x 3 x 5 x       Stool            Stool Occurrence(s) 0 x 0 x 0 x       Shift Total  (mL/kg)           660      Weight (kg) 95.3 95.3 95.3 95.3 95.3 95.3       Recommendations:  1. Patient needs and requests: none    2. Diet: diabetic    3. Pending tests/procedures: am labs     4. Functional Level/Equipment: assist x 2/wheelchair    5.  Estimated Discharge Date: tbd Posted on Whiteboard in Patients Room: no     HEALS Safety Check    A safety check occurred in the patient's room between off going nurse and oncoming nurse listed above. The safety check included the below items  Area Items   H  High Alert Medications - Verify all high alert medication drips (heparin, PCA, etc.)   E  Equipment - Suction is set up for ALL patients (with lashay)  - Red plugs utilized for all equipment (IV pumps, etc.)  - WOWs wiped down at end of shift.  - Room stocked with oxygen, suction, and other unit-specific supplies   A  Alarms - Bed alarm is set for fall risk patients  - Ensure chair alarm is in place and activated if patient is up in a chair   L  Lines - Check IV for any infiltration  - Renae bag is empty if patient has a Renae   - Tubing and IV bags are labeled   S  Safety   - Room is clean, patient is clean, and equipment is clean. - Hallways are clear from equipment besides carts. - Fall bracelet on for fall risk patients  - Ensure room is clear and free of clutter  - Suction is set up for ALL patients (with lashay)  - Hallways are clear from equipment besides carts.    - Isolation precautions followed, supplies available outside room, sign posted   Swapna Fitzpatrick RN

## 2017-05-21 NOTE — PROGRESS NOTES
conducted a Follow up consultation and Spiritual Assessment for Stephen Sinha, who is a 62 y.o.,female. The  provided the following Interventions:  Continued the relationship of care and support. Listened empathically. Offered prayer and assurance of continued prayer on patients behalf. Chart reviewed. The following outcomes were achieved:  Patient expressed gratitude for 's visit. Assessment:  There are no further spiritual or Gnosticist issues which require Spiritual Care Services interventions at this time. Plan:  Chaplains will continue to follow and will provide pastoral care on an as needed/requested basis.  recommends bedside caregivers page  on duty if patient shows signs of acute spiritual or emotional distress.        [address]   [phone]

## 2017-05-21 NOTE — PROGRESS NOTES
Progress Note    Patient: Rosalia Nogueira MRN: [id number]  CSN: [id number]    YOB: 1955  Age: 62 y.o. Sex: female    DOA: 5/10/2017 LOS:  LOS: 11 days                    Subjective:     Primary Rehab Diagnosis: Impaired Mobility and ADLs secondary to Systemic inflammatory response syndrome (SIRS) secondary to:  1. Cellulitis of the right forearm  2. Olecranon bursitis of the right elbow  3. Contusion of left elbow  4. Right Achilles tendonitis      Review of systems  General: No fevers or chills. Cardiovascular: No chest pain or pressure. No palpitations. Pulmonary: No shortness of breath, cough or wheeze. Gastrointestinal: No abdominal pain, nausea, vomiting or diarrhea. Genitourinary: No urinary frequency, urgency, hesitancy or dysuria. Musculoskeletal: Rt elbow and forarm pain improved  Neurologic: No headache, numbness, tingling or weakness. Objective:     Physical Exam:  Visit Vitals    /71    Pulse 78    Temp 98.7 °F (37.1 °C)    Resp 18    Ht 5' 5\" (1.651 m)    Wt 95.3 kg (210 lb 1.6 oz)    SpO2 92%    BMI 34.96 kg/m2        General:         Alert, cooperative, no acute distress    HEENT: NC, Atraumatic. PERRLA, anicteric sclerae. Lungs: CTA Bilaterally. No Wheezing/Rhonchi/Rales. Heart:  Regular  rhythm,  No murmur, No Rubs, No Gallops  Abdomen: Soft, Non distended, Non tender.  +Bowel sounds, no HSM  Extremities: B/L lower limb edema  Psych:    Not anxious or agitated. Neurologic:  CN 2-12 grossly intact, Alert and oriented X 3.   No acute neurological                                 Deficits,     Intake and Output:  Current Shift:  05/21 0701 - 05/21 1900  In: 560 [P.O.:560]  Out: -   Last three shifts:  05/19 1901 - 05/21 0700  In: 660 [P.O.:660]  Out: -     Labs: Results:       Chemistry Recent Labs      05/21/17   0609  05/20/17   0700   GLU  184*  159*   NA  138  138   K  4.3  4.8   CL  98*  101   CO2  31  27   BUN  17  16   CREA  1.58*  1.58*   CA 10.5*  10.7*   AGAP  9  10   BUCR  11*  10*   AP  54   --    TP  7.2   --    ALB  2.6*   --    GLOB  4.6*   --    AGRAT  0.6*   --       CBC w/Diff Recent Labs      05/21/17   0609   WBC  7.1   RBC  3.88*   HGB  10.5*   HCT  32.2*   PLT  197   GRANS  58   LYMPH  31   EOS  4      Cardiac Enzymes No results for input(s): CPK, CKND1, SANTOS in the last 72 hours. No lab exists for component: CKRMB, TROIP   Coagulation No results for input(s): PTP, INR, APTT in the last 72 hours. No lab exists for component: INREXT, INREXT    Lipid Panel Lab Results   Component Value Date/Time    Cholesterol, total 134 11/06/2016 04:18 AM    HDL Cholesterol 38 11/06/2016 04:18 AM    LDL, calculated 37 11/06/2016 04:18 AM    VLDL, calculated 59 11/06/2016 04:18 AM    Triglyceride 295 11/06/2016 04:18 AM    CHOL/HDL Ratio 3.5 11/06/2016 04:18 AM      BNP No results for input(s): BNPP in the last 72 hours.    Liver Enzymes Recent Labs      05/21/17   0609   TP  7.2   ALB  2.6*   AP  54   SGOT  44*      Thyroid Studies Lab Results   Component Value Date/Time    TSH 3.49 05/09/2017 03:25 AM          Procedures/imaging: see electronic medical records for all procedures/Xrays and details which were not copied into this note but were reviewed prior to creation of Plan    Medications:   Current Facility-Administered Medications   Medication Dose Route Frequency    insulin glargine (LANTUS) injection 44 Units  44 Units SubCUTAneous ACB    insulin glargine (LANTUS) injection 22 Units  22 Units SubCUTAneous QHS    oxyCODONE-acetaminophen (PERCOCET) 5-325 mg per tablet 1 Tab  1 Tab Oral TID    oxyCODONE-acetaminophen (PERCOCET) 5-325 mg per tablet 1 Tab  1 Tab Oral Q4H PRN    tolterodine (DETROL) tablet 1 mg  1 mg Oral BID    famotidine (PEPCID) tablet 20 mg  20 mg Oral DAILY    gabapentin (NEURONTIN) capsule 300 mg  300 mg Oral Q12H    diphenhydrAMINE (BENADRYL) capsule 25 mg  25 mg Oral QHS    magnesium oxide (MAG-OX) tablet 400 mg  400 mg Oral BID    cholecalciferol (VITAMIN D3) tablet 1,000 Units  1,000 Units Oral DAILY    acetaminophen (TYLENOL) tablet 650 mg  650 mg Oral Q4H PRN    docusate sodium (COLACE) capsule 100 mg  100 mg Oral BID    bisacodyl (DULCOLAX) tablet 10 mg  10 mg Oral Q48H PRN    heparin (porcine) injection 5,000 Units  5,000 Units SubCUTAneous Q8H    insulin lispro (HUMALOG) injection   SubCUTAneous TIDAC    albuterol (PROVENTIL VENTOLIN) nebulizer solution 2.5 mg  2.5 mg Nebulization Q4H PRN    divalproex DR (DEPAKOTE) tablet 250 mg  250 mg Oral QHS    ARIPiprazole (ABILIFY) tablet 10 mg  10 mg Oral DAILY    traZODone (DESYREL) tablet 100 mg  100 mg Oral QHS    rosuvastatin (CRESTOR) tablet 5 mg  5 mg Oral QHS    aspirin chewable tablet 81 mg  81 mg Oral DAILY WITH BREAKFAST    levothyroxine (SYNTHROID) tablet 100 mcg  100 mcg Oral ACB    sertraline (ZOLOFT) tablet 50 mg  50 mg Oral DAILY    umeclidinium-vilanterol (ANORO ELLIPTA) 62.5 mcg- 25 mcg/inhalation  1 Puff Inhalation QAM RT    B complex-vitaminC-folic acid (NEPHROCAP) cap  1 Cap Oral DAILY       Assessment/Plan     Principal Problem:    SIRS (systemic inflammatory response syndrome) ([facility]) (5/2/2017)    Active Problems:    Type 2 diabetes with stage 3 chronic kidney disease GFR 30-59 ([facility]) ()      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 congestive heart failure ()      Overview: Better controlled      Cellulitis of right forearm (5/4/2017)      Olecranon bursitis of right elbow (5/4/2017)      Contusion of left elbow (5/4/2017)      Right Achilles tendinitis (5/2/2017)      Impaired mobility and ADLs (5/4/2017)      Acute renal failure superimposed on stage 3 chronic kidney disease (HCC) (5/15/2017)      Plan   Systemic inflammatory response syndrome (SIRS) secondary to Cellulitis of the right forearm, Olecranon bursitis of the right elbow, Contusion of left elbow and Right Achilles tendonitis Patient had completed the recommended treatment course of oral Clindamycin on 5/13/2017       Abnormally low HDL with hypertriglyceridemia  Continue Rosuvastatin 5 mg PO q HS       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 congestive heart failure   Pt off  Furosemide 20 mg PO once daily (re: rising Creatinine)      Bipolar affective disease  Aripiprazole 10 mg PO once daily   Divalproex  mg PO q HS       Chronic obstructive pulmonary disease  Continue:   Anoro Ellipta 1 puff inhaled once daily  Albuterol nebulization q 4 hr PRN for shortness of breath      Depression / Anxiety   Continue:Aripiprazole 10 mg PO once daily   Sertraline 50 mg PO once daily  Trazodone 100 mg PO q HS       Diabetic neuropathy   Continue Gabapentin 300 mg PO q 12 hr       Diastolic dysfunction without heart failure   off lasix   Continue to monitor sx       Gastroesophageal reflux disease with hiatal hernia   Continue Famotidine 20 mg PO once daily       Hypothyroidism   Continue Levothyroxine 100 mcg PO once daily before breakfast       Hypoxemia requiring supplemental oxygen   Continue O2 inhalation 2 LPM via nasal cannula continuous       Type 2 diabetes mellitus with diabetic neuropathy and stage 3 chronic kidney disease    Continue: Change Lantus  44 units SC q AM   Change Lantus 22 units SC q PM   Humalog insulin sliding scale SC TID AC only     Continue Mg(OH)2 400 mg PO BID         Acute renal failure superimposed on stage 3 chronic kidney disease  Increase  oral fluid intake     Difficulty sleeping    Continue:  Trazodone 100 mg PO q HS Diphenhydramine HCl 25 mg PO q HS      Urinary urge incontinence  Continue: Tolterodine 1 mg PO BID  Diphenhydramine HCl 25 mg PO q HS (re: ADR is urinary retention)     Analgesia   Acetaminophen 650 mg PO q 4 hr PRN for pain level less than 5/10  Percocet 5/325 1 tab PO q 4 hr PRN for pain level greater than 4/10 (from 8PM to 4AM only)      Cr stable continue current treatment    Ye Ramirez MD  5/21/2017 2:24 PM

## 2017-05-21 NOTE — REHAB NOTE
SHIFT CHANGE NOTE FOR [facility]  Bedside and Verbal shift change report given to [address] (oncoming nurse) by Gentry Malhotra LPN   (offgoing nurse). Report included the following information SBAR, Kardex, MAR and Recent Results.     Situation:   Code Status: Full Code   Reason for Admission: [address] [facility] Day: 11   Problem List:   Hospital Problems  Date Reviewed: 5/19/2017          Codes Class Noted POA    Acute renal failure superimposed on stage 3 chronic kidney disease ([facility] ICD-10-CM: N17.9, N18.3  ICD-9-CM: 584.9, 585.3  5/15/2017 No        Cellulitis of right forearm ICD-10-CM: L03.113  ICD-9-CM: 682.3  5/4/2017 Yes        Olecranon bursitis of right elbow ICD-10-CM: M70.21  ICD-9-CM: 726.33  5/4/2017 Yes        Contusion of left elbow ICD-10-CM: S50.02XA  ICD-9-CM: 923.11  5/4/2017 Yes        Impaired mobility and ADLs ICD-10-CM: Z74.09  ICD-9-CM: 799.89  5/4/2017 Yes        * (Principal)SIRS (systemic inflammatory response syndrome) ([facility] ICD-10-CM: R65.10  ICD-9-CM: 995.90  5/2/2017 Yes        Right Achilles tendinitis ICD-10-CM: M76.61  ICD-9-CM: 726.71  5/2/2017 Yes        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 congestive heart failure (Chronic) ICD-10-CM: I13.10, N18.3  ICD-9-CM: 404.10, 585.3  Unknown Yes    Overview Signed 4/23/2013 10:02 AM by Iris Maldonado     Better controlled             Type 2 diabetes with stage 3 chronic kidney disease GFR 30-59 (HCC) (Chronic) ICD-10-CM: E11.22, N18.3  ICD-9-CM: 250.40, 585.3  Unknown Yes              Background:   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 congestive heart failure     Better controlled     Bipolar affective disorder ([facility] 12/5/2012    Cellulitis of right forearm 5/4/2017    Chronic back pain     Chronic obstructive pulmonary disease (COPD) (HCC)     SOB, on [place]ula O2 Recent admission with psychosis     CKD stage G3a/A1, GFR 45-59 and albumin creatinine ratio <30 mg/g 5/11/2017    Urine microalbumin/Creatinine ratio (5/11/2017) = 9 mg/g    Contusion of left elbow 5/4/2017    Depression     Diabetic neuropathy associated with type 2 diabetes mellitus (HCC)     Diastolic dysfunction without heart failure     Stable on diuretics     Falls frequently     Gastroesophageal reflux disease with hiatal hernia     Generalized osteoarthritis of multiple sites     History of acute renal failure 5/31/2013    History of back injury     jumped out of second story window     History of hepatitis C     treated    History of penicillin allergy     Hypothyroidism     Hypoxemia requiring supplemental oxygen 12/29/2014    Intravenous drug user 5/2/2017    Memory difficulty     Mixed connective tissue disease ([facility] 5/10/2017    Obesity, Class I, BMI 30-34.9     Olecranon bursitis of right elbow 5/4/2017    Recurrent genital herpes 5/31/2013    Right Achilles tendinitis 5/2/2017    Sarcoidosis ([facility]     Sickle cell trait ([facility]     Type 2 diabetes with stage 3 chronic kidney disease GFR 30-59 ([facility]     Venous insufficiency     Wears glasses       Patient taking anticoagulants yes    Patient has a defibrillator: no     Assessment:   Changes in Assessment throughout shift: no     Patient has central line: no Reasons if yes: Insertion date: Last dressing date:   Patient has Renae Cath: no Reasons if yes:     Insertion date:     Last Vitals:     Vitals:    05/20/17 0743 05/20/17 1637 05/20/17 2047 05/21/17 0755   BP: 119/71 97/68 108/67 117/71   Pulse: 86 84 85 78   Resp: 18 18 18 18   Temp: 99.1 °F (37.3 °C) 99.5 °F (37.5 °C) 98.8 °F (37.1 °C) 98.7 °F (37.1 °C)   SpO2: 91% 93%  92%   Weight:       Height:       LMP: 11/25/2012        PAIN    Pain Assessment    Pain Intensity 1: 5 (05/21/17 1600) Pain Intensity 1: 2 (12/29/14 1105)    Pain Location 1: Generalized Pain Location 1: Abdomen    Pain Intervention(s) 1: Medication (see MAR) Pain Intervention(s) 1: Medication (see MAR)  Patient Stated Pain Goal: 0 Patient Stated Pain Goal: 0  o Intervention effective: yes   o Other actions taken for pain: repositioning     Skin Assessment  Skin color Skin Color: Appropriate for ethnicity  Condition/Temperature Skin Condition/Temp: Dry  Integrity Skin Integrity: Incision (comment)  Turgor Turgor: Non-tenting  Weekly Pressure Ulcer Documentation  Pressure  Injury Documentation: No Pressure Injury Noted-Pressure Ulcer Prevention Initiated  Wound Prevention & Protection Methods  Orientation of wound Orientation of Wound Prevention: Posterior  Location of Prevention Location of Wound Prevention: Buttocks, Sacrum/Coccyx  Dressing Present Dressing Present : No  Dressing Status    Wound Offloading Wound Offloading (Prevention Methods): Bed, pressure redistribution/air     INTAKE/OUPUT    Date 05/20/17 0700 - 05/21/17 0659 05/21/17 0700 - 05/22/17 0659   Shift 0979-3460 9763-2373 24 Hour Total 1359-7876 2915-2200 24 Hour Total   I  N  T  A  K  E   P.O. 660  660 560  560      P. O. 660  660 560  560    Shift Total  (mL/kg) 660  (6.9)  660  (6.9) 560  (5.9)  560  (5.9)   O  U  T  P  U  T   Urine  (mL/kg/hr)            Urine Occurrence(s) 2 x 3 x 5 x 3 x  3 x    Stool            Stool Occurrence(s) 0 x 0 x 0 x 1 x  1 x    Shift Total  (mL/kg)           660 560  560   Weight (kg) 95.3 95.3 95.3 95.3 95.3 95.3       Recommendations:  1. Patient needs and requests: none    2. Diet: diabetic    3. Pending tests/procedures: am labs     4. Functional Level/Equipment: assist x 2/wheelchair    5.  Estimated Discharge Date: tbd Posted on Whiteboard in Patients Room: no     HEALS Safety Check    A safety check occurred in the patient's room between off going nurse and oncoming nurse listed above. The safety check included the below items  Area Items   H  High Alert Medications - Verify all high alert medication drips (heparin, PCA, etc.)   E  Equipment - Suction is set up for ALL patients (with lashay)  - Red plugs utilized for all equipment (IV pumps, etc.)  - WOWs wiped down at end of shift.  - Room stocked with oxygen, suction, and other unit-specific supplies   A  Alarms - Bed alarm is set for fall risk patients  - Ensure chair alarm is in place and activated if patient is up in a chair   L  Lines - Check IV for any infiltration  - Renae bag is empty if patient has a Renae   - Tubing and IV bags are labeled   S  Safety   - Room is clean, patient is clean, and equipment is clean. - Hallways are clear from equipment besides carts. - Fall bracelet on for fall risk patients  - Ensure room is clear and free of clutter  - Suction is set up for ALL patients (with lashay)  - Hallways are clear from equipment besides carts.    - Isolation precautions followed, supplies available outside room, sign posted   Deandre Lugo LPN

## 2017-05-21 NOTE — PROGRESS NOTES
[x] Psychology  [] Social Work [] Recreational Therapy    INTERVENTION  UNITS/TIME OF SERVICE   Assessment    Supportive Counseling May 19, 2017   Orientation    Discharge Planning    Resource Linkage              Progress/Current Status    Patient seem for individual support  on ARU this date. She is found sitting upright in wheelchair in room. Upon my entrance, patient immediately begins to make requests for assistance. In fact, as has been observed and now seems quite evident, patient is often seeking assistance and displays heightened dependency, perhaps at times when she could do for herself. At the same time, she does not seem to perceive these tendencies in herself and might become very defensive, as if being criticized, if directly brought to her attention. Instead, patient needs to be consistently directed toward more independent efforts when and as appropriate, so that she can try to be more purposeful for herself and not rely on others. However, if these behaviors do not change markedly in her limited time on ARU, then it is thought she will be increasingly dependent on her discharge and require supervision for safety and assist.  She is not otherwise displaying any indications of acute distress this morning.     Rosi Lopez, THE [facility] 5/21/2017 11:27 AM

## 2017-05-21 NOTE — ROUTINE PROCESS
Bedside and Verbal shift change report given to [address] (oncoming nurse) by Kristine newman). Report included the following information SBAR, Kardex and MAR.

## 2017-05-22 PROBLEM — E83.42 HYPOMAGNESEMIA: Status: ACTIVE | Noted: 2017-05-22

## 2017-05-22 LAB
ANION GAP BLD CALC-SCNC: 10 MMOL/L (ref 3–18)
BACTERIA SPEC CULT: NORMAL
BACTERIA SPEC CULT: NORMAL
BUN SERPL-MCNC: 19 MG/DL (ref 7–18)
BUN/CREAT SERPL: 12 (ref 12–20)
CALCIUM SERPL-MCNC: 10.8 MG/DL (ref 8.5–10.1)
CHLORIDE SERPL-SCNC: 99 MMOL/L (ref 100–108)
CO2 SERPL-SCNC: 31 MMOL/L (ref 21–32)
CREAT SERPL-MCNC: 1.62 MG/DL (ref 0.6–1.3)
FUNGUS SMEAR,FNGSMR: NORMAL
FUNGUS SMEAR,FNGSMR: NORMAL
GLUCOSE BLD STRIP.AUTO-MCNC: 124 MG/DL (ref 70–110)
GLUCOSE BLD STRIP.AUTO-MCNC: 184 MG/DL (ref 70–110)
GLUCOSE BLD STRIP.AUTO-MCNC: 200 MG/DL (ref 70–110)
GLUCOSE BLD STRIP.AUTO-MCNC: 241 MG/DL (ref 70–110)
GLUCOSE SERPL-MCNC: 152 MG/DL (ref 74–99)
HCT VFR BLD AUTO: 32.1 % (ref 35–45)
HGB BLD-MCNC: 10.3 G/DL (ref 12–16)
MAGNESIUM SERPL-MCNC: 1.5 MG/DL (ref 1.6–2.6)
PLATELET # BLD AUTO: 181 K/UL (ref 135–420)
POTASSIUM SERPL-SCNC: 4.5 MMOL/L (ref 3.5–5.5)
SERVICE CMNT-IMP: NORMAL
SERVICE CMNT-IMP: NORMAL
SODIUM SERPL-SCNC: 140 MMOL/L (ref 136–145)

## 2017-05-22 PROCEDURE — 74011250637 HC RX REV CODE- 250/637: Performed by: INTERNAL MEDICINE

## 2017-05-22 PROCEDURE — 97116 GAIT TRAINING THERAPY: CPT

## 2017-05-22 PROCEDURE — 85049 AUTOMATED PLATELET COUNT: CPT | Performed by: INTERNAL MEDICINE

## 2017-05-22 PROCEDURE — 97150 GROUP THERAPEUTIC PROCEDURES: CPT

## 2017-05-22 PROCEDURE — 97535 SELF CARE MNGMENT TRAINING: CPT

## 2017-05-22 PROCEDURE — 74011250636 HC RX REV CODE- 250/636: Performed by: INTERNAL MEDICINE

## 2017-05-22 PROCEDURE — 97530 THERAPEUTIC ACTIVITIES: CPT

## 2017-05-22 PROCEDURE — 80048 BASIC METABOLIC PNL TOTAL CA: CPT | Performed by: INTERNAL MEDICINE

## 2017-05-22 PROCEDURE — 36415 COLL VENOUS BLD VENIPUNCTURE: CPT | Performed by: INTERNAL MEDICINE

## 2017-05-22 PROCEDURE — 83735 ASSAY OF MAGNESIUM: CPT | Performed by: INTERNAL MEDICINE

## 2017-05-22 PROCEDURE — 65310000000 HC RM PRIVATE REHAB

## 2017-05-22 PROCEDURE — 85018 HEMOGLOBIN: CPT | Performed by: INTERNAL MEDICINE

## 2017-05-22 PROCEDURE — 82962 GLUCOSE BLOOD TEST: CPT

## 2017-05-22 PROCEDURE — 74011636637 HC RX REV CODE- 636/637: Performed by: INTERNAL MEDICINE

## 2017-05-22 RX ORDER — INSULIN GLARGINE 100 [IU]/ML
25 INJECTION, SOLUTION SUBCUTANEOUS
Status: DISCONTINUED | OUTPATIENT
Start: 2017-05-22 | End: 2017-05-26

## 2017-05-22 RX ORDER — SODIUM CHLORIDE 9 MG/ML
1000 INJECTION, SOLUTION INTRAVENOUS ONCE
Status: COMPLETED | OUTPATIENT
Start: 2017-05-22 | End: 2017-05-22

## 2017-05-22 RX ORDER — INSULIN GLARGINE 100 [IU]/ML
50 INJECTION, SOLUTION SUBCUTANEOUS
Status: DISCONTINUED | OUTPATIENT
Start: 2017-05-23 | End: 2017-05-24

## 2017-05-22 RX ORDER — LANOLIN ALCOHOL/MO/W.PET/CERES
800 CREAM (GRAM) TOPICAL 2 TIMES DAILY
Status: DISCONTINUED | OUTPATIENT
Start: 2017-05-22 | End: 2017-05-30

## 2017-05-22 RX ADMIN — GABAPENTIN 300 MG: 300 CAPSULE ORAL at 17:55

## 2017-05-22 RX ADMIN — OXYCODONE HYDROCHLORIDE AND ACETAMINOPHEN 1 TABLET: 5; 325 TABLET ORAL at 08:27

## 2017-05-22 RX ADMIN — GABAPENTIN 300 MG: 300 CAPSULE ORAL at 06:16

## 2017-05-22 RX ADMIN — TOLTERODINE TARTRATE 1 MG: 1 TABLET, FILM COATED ORAL at 17:55

## 2017-05-22 RX ADMIN — CHOLECALCIFEROL TAB 25 MCG (1000 UNIT) 1000 UNITS: 25 TAB at 08:27

## 2017-05-22 RX ADMIN — INSULIN GLARGINE 25 UNITS: 100 INJECTION, SOLUTION SUBCUTANEOUS at 22:17

## 2017-05-22 RX ADMIN — DIPHENHYDRAMINE HYDROCHLORIDE 25 MG: 25 CAPSULE ORAL at 22:16

## 2017-05-22 RX ADMIN — OXYCODONE HYDROCHLORIDE AND ACETAMINOPHEN 1 TABLET: 5; 325 TABLET ORAL at 11:43

## 2017-05-22 RX ADMIN — HEPARIN SODIUM 5000 UNITS: 5000 INJECTION, SOLUTION INTRAVENOUS; SUBCUTANEOUS at 15:59

## 2017-05-22 RX ADMIN — HEPARIN SODIUM 5000 UNITS: 5000 INJECTION, SOLUTION INTRAVENOUS; SUBCUTANEOUS at 22:17

## 2017-05-22 RX ADMIN — OXYCODONE HYDROCHLORIDE AND ACETAMINOPHEN 1 TABLET: 5; 325 TABLET ORAL at 20:47

## 2017-05-22 RX ADMIN — Medication 800 MG: at 18:01

## 2017-05-22 RX ADMIN — INSULIN GLARGINE 44 UNITS: 100 INJECTION, SOLUTION SUBCUTANEOUS at 08:29

## 2017-05-22 RX ADMIN — ASPIRIN 81 MG CHEWABLE TABLET 81 MG: 81 TABLET CHEWABLE at 08:27

## 2017-05-22 RX ADMIN — Medication 400 MG: at 08:28

## 2017-05-22 RX ADMIN — INSULIN LISPRO 2 UNITS: 100 INJECTION, SOLUTION INTRAVENOUS; SUBCUTANEOUS at 17:56

## 2017-05-22 RX ADMIN — OXYCODONE HYDROCHLORIDE AND ACETAMINOPHEN 1 TABLET: 5; 325 TABLET ORAL at 03:56

## 2017-05-22 RX ADMIN — DOCUSATE SODIUM 100 MG: 100 CAPSULE, LIQUID FILLED ORAL at 08:27

## 2017-05-22 RX ADMIN — ROSUVASTATIN CALCIUM 5 MG: 5 TABLET ORAL at 22:16

## 2017-05-22 RX ADMIN — FAMOTIDINE 20 MG: 20 TABLET ORAL at 08:27

## 2017-05-22 RX ADMIN — DOCUSATE SODIUM 100 MG: 100 CAPSULE, LIQUID FILLED ORAL at 17:55

## 2017-05-22 RX ADMIN — DIVALPROEX SODIUM 250 MG: 250 TABLET, DELAYED RELEASE ORAL at 22:16

## 2017-05-22 RX ADMIN — TRAZODONE HYDROCHLORIDE 100 MG: 50 TABLET ORAL at 22:16

## 2017-05-22 RX ADMIN — SERTRALINE HYDROCHLORIDE 50 MG: 50 TABLET ORAL at 08:27

## 2017-05-22 RX ADMIN — NEPHROCAP 1 CAPSULE: 1 CAP ORAL at 08:27

## 2017-05-22 RX ADMIN — SODIUM CHLORIDE 1000 ML: 900 INJECTION, SOLUTION INTRAVENOUS at 16:02

## 2017-05-22 RX ADMIN — TOLTERODINE TARTRATE 1 MG: 1 TABLET, FILM COATED ORAL at 08:28

## 2017-05-22 RX ADMIN — HEPARIN SODIUM 5000 UNITS: 5000 INJECTION, SOLUTION INTRAVENOUS; SUBCUTANEOUS at 06:16

## 2017-05-22 RX ADMIN — ARIPIPRAZOLE 10 MG: 10 TABLET ORAL at 08:28

## 2017-05-22 RX ADMIN — INSULIN LISPRO 4 UNITS: 100 INJECTION, SOLUTION INTRAVENOUS; SUBCUTANEOUS at 13:08

## 2017-05-22 RX ADMIN — OXYCODONE HYDROCHLORIDE AND ACETAMINOPHEN 1 TABLET: 5; 325 TABLET ORAL at 15:59

## 2017-05-22 RX ADMIN — LEVOTHYROXINE SODIUM 100 MCG: 100 TABLET ORAL at 06:36

## 2017-05-22 NOTE — INTERDISCIPLINARY ROUNDS
[facility] PHYSICAL REHABILITATION  [address] [place] [address], [address]    INPATIENT REHABILITATION  PRE-TEAM CONFERENCE SUMMARY     Date of Conference: 5/23/2017    Name: Juli Guerra Age / Sex: 62 y.o. / female   CSN: [id number] MRN: [id number]   [facility] Date: 5/10/2017 Length of Stay: 12 days     Primary Rehabilitation Diagnosis  1. Impaired Mobility and ADLs  2. Systemic inflammatory response syndrome (SIRS) secondary to:  a. Cellulitis of the right forearm  b. Olecranon bursitis of the right elbow  c. Contusion of left elbow  d.  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 congestive heart failure    Depression    History of back injury    Anxiety    Wears glasses    History of hepatitis C    Sickle cell trait (HCC)    History of acute renal failure    Hypothyroidism    Recurrent genital herpes    Sarcoidosis ([facility]    History pf abscess of right arm    Hypoxemia requiring supplemental oxygen    Abnormal nuclear stress test    History of cellulitis and abscess of hand    History of cellulitis and abscess of foot    Intravenous drug user    History of penicillin allergy    Falls frequently    Gastroesophageal reflux disease with hiatal hernia    Memory difficulty    Mixed connective tissue disease     CKD stage G3a/A1, GFR 45-59 and albumin creatinine ratio <30 mg/g     Acute renal failure superimposed on stage 3 chronic kidney disease    Hypomagnesemia         Therapy:     FIM SCORES Initial Assessment Weekly Progress Assessment 5/22/2017   Eating Functional Level: 2  Comments: Pt demonstrates ability to scoop eggs using fork and bring them to her mouth with significantly increaed time and effort, however she fatigues easily and needs assistance to feed herself majority of a meal. Pt is able to hold a small cup with her R hand and bring to her mouth to drink. Feeding/Eating Assistance: 5 (Supervision/setup)  Comments: tray s/u   Swallowing     Grooming 1 Grooming Assistance : 4 (Minimal assistance)  Comments: s/u and assist to com back of hair    Bathing 1 Bathing Assistance, Upper: 4 (Minimal assistance)  Position Performed: Seated in chair  Comments: Pt required encouragement to attempt washing UB. Pt required assist to wash under R axilla. Bathing Assistance, Lower : 4 (Minimal assistance)  Comments: Encouragement and re-education to use AE and to wt shift for periarea/ buttocks. Assist to wash buttocks    Upper Body Dressing Functional Level: 1  Items Applied/Steps Completed: Bra (3 steps), Pullover (4 steps)  Comments: Pt donned bra and t-shirt while sitting up on EOB with SBA for sitting balance and safety. Pt required Total A for threading shirt over BUEs and to pull shirt overhead due to impaired BUE AROM and strength. Dressing Assistance : 3 (Moderate assistance)  Comments: total A for bra donning due to pt refusal to attempt. pt threaded B arms into shirt with assist and encouragement and assist and cues to shante over head. Lower Body Dressing Functional Level: 1  Items Applied/Steps Completed: Elastic waist pants (3 steps), Sock, left (1 step), Sock, right (1 step)  Comments: Pt requires Total A for managing all aspects of LB dressing at bed level. Pt performed rolling with Max-Total A x1 while second person assisted with donning of brief and pants over buttocks.  Pt unable to reach her feet and needs assistance for doffing/donning socks and pants over B feet. Dressing Assistance : 3 (Moderate assistance)  Leg Crossed Method Used: No  Position Performed: Seated in chair  Adaptive Equipment Used: Reacher;Sock aid  Comments: Pt used reacher to thread pants with mod A; socks donned with max A with sock aid ; max A to shante breif; min assist to pull pants over hips    Toileting Functional Level: 2  Comments: Pt completed toileting task at bed level utilizing bed pain. She needs the assistance of 2 people in order to perform all aspects of hygiene and clothing management with Max-Total A x1 for rolling. Toileting Assistance (FIM Score): 2 (Maximal assistance)  Cues: Physical assistance for pants down;Physical assistance for pants up;Verbal cues provided   Bladder - level of assist 2 6   Bladder - accident frequency score 6 6   Bowel - level of assist 3 1   Bowel - accident frequency score 6 2   Toilet Transfer [place] Toilet Transfer Score: 0  Comments: Based on bed to w/c transfer, pt would require the assistance of 2 people for ant and up weightshift, balance, weightshift, RLE advance, and slow controlled descent with stand to sit to complete stand step transfer without AD to/from [facility]. 5 (stand-by assistance)   Tub/Shower Transfer [place] Tub or Shower Type: Tub/Shower combination  Tub/Shower Transfer Score: 0  Comments: Pt not safe to perform shower transfer at this time due to impaired strength, balance, and overall activity tolerance.  Tub/Shower combination  5 (Stand-by assistance)   Comprehension Primary Mode of Comprehension: Auditory  Score: 4 Auditory  5   Expression Primary Mode of Expression: Verbal  Score: 4 Verbal  6   Social Interaction Score: 4 5   Problem Solving Score: 3 4   Memory Score: 3 5     FIM SCORES Initial Assessment Weekly Progress Assessment 5/22/2017   Bed/Chair/Wheelchair Transfers Transfer Type: Lateral with transfer board  Other: stand step txfr without AD/anterior approach  Transfer Assistance : 2 (Maximal assistance)  Sit to Stand Assistance: Maximum assistance (in p-bars, pull to stand)  Car Transfers: Not tested  Car Type: n/a Sit to Stand Assistance: Supervision   txfr type: stand step txfr with RW  txfr A: SBA and v/c to manage o2 tubing.     Bed Mobility Rolling Right 1 (Total assistance)   Rolling Left 1 (Total assistance)   Supine to Sit 1 (Total assistance)   Sit to Stand Maximum assistance (in p-bars, pull to stand)   Sit to Supine 1 (Total assistance)    Rolling Right    NT   Rolling Left    NT   Supine to Sit    S   Sit to Stand   Supervision   Sit to Supine    S      Locomotion (W/C) Able to Propel (ft): 10 feet  Functional Level: 1  Curbs/Ramps Assist Required (FIM Score): 0 (Not tested)  Wheelchair Setup Assist Required : 2 (Maximal assistance)  Wheelchair Management: Other (comment) (needs help to manage brakes at this time) Function 5  Setup Assistance  5 (Supervision/setup) (not using leg rests)      Locomotion (W/C distance) 10 Feet 170 feet   Locomotion (Walk) 1 (Dependent/total assistance) 5 (Stand-by assistance)  Walker, rolling   Locomotion (Walk dist.) 0 Feet (ft) 165 Feet (ft)   Steps/Stairs Steps/Stairs Ambulated (#): 0  Level of Assist : 0 (Not tested)  Rail Use: Both  NT         Nursing:     Neuro:   A&O x__4__                   Respiratory:   [] WNL   [x] O2   [x] LPM __2____   Other:  Peripheral Vascular:   [] TEDS present   [] Edema present ____ Grade   Cardiac:   [] WNL   [] Other  Genitourinary:   [x] continent   [x] incontinent   [] rousseau  Abdominal _______ LBM  GI: _diabetic______ Diet __thin____ Liquids _____ tube feeds  Musculoskeletal: lateral with transfer board____ ROM Transfers _____ Assistive Device Used  __min__ Level of Assistance  Skin Integumentary:   [] Intact   [] Not Intact   __________Preventative Measures  Details______________________________________________________________  Pain: [] Controlled   [x] Not Controlled   Pain Meds:   [x] Scheduled   [x] PRN        Registered Dietitian / Nutrition:   Patient Vitals for the past 100 hrs:   % Diet Eaten   05/22/17 1326 75 %   05/22/17 0930 85 %   05/21/17 1833 90 %   05/21/17 1351 100 %   05/21/17 0946 100 %   05/20/17 1805 70 %   05/20/17 1300 80 %   05/20/17 1000 100 %   05/19/17 1840 75 %   05/19/17 1320 75 %   05/19/17 0938 75 %   05/18/17 1800 100 %               Supplements:          [] Yes   [] No      Amount of supplement consumed:        Intake/Output Summary (Last 24 hours) at 05/22/17 1504  Last data filed at 05/22/17 1326   Gross per 24 hour   Intake              720 ml   Output                0 ml   Net              720 ml                              Last bowel movement:         Interdisciplinary Team Goals:     1. Goal  Pt will perform household ambulation Aries with RW and managing o2 tubing independently. Barrier  BLE pain, self limiting, slow movement    Intervention  gait, txfr and bed mobility training, exercises, balance activities     2. Goal  Pt will perform toileting task with minimal assistance for hygiene with minimal encouragement. Barrier  Decreased UE ROM, Pain, Self-limiting    Intervention  ADL training, Therapeutic activity, Therapeutic exercise     3. Goal      Barrier      Intervention       4. Goal  Pain controlled <5 at rest by discharge    Barrier  movement    Intervention  Pain meds as ordered     5. Goal      Barrier      Intervention       6. Goal  Increase motivation for greater independent behavior    Barrier  Frequent dependent behavior and unnecessary requests for assist    Intervention  Behavioral redirection       Disposition / Discharge Planning:      Follow-up therapy services:  tbd   DME recommendations:  tbd   Estimated discharge date:  5/31/17   Discharge Location:  home         Electronic Signatures:      Signature Date Signed   Physical Therapist    Daralene Cowden, Rosaline Ou  5/22/17   Occupational Therapist    Bashir Calvin 80. 5/23/2017   Speech Therapist         Recreational Therapist    Lorena Tom, CTRS 5/22/17   Nursing    Tomas Rose RN  5/23/17   Dietitian         Clinical Psychologist    Jerome Mcghee, Ph.D. 5/22/17    Physician    Daniel Moreno. Vida Randolph MD  5/22/2017       Manisha Manning, MSW  5/22/17         The above information has been reviewed with the patient in a language that they can understand. Opportunity for comments and questions has been provided and a signed attestation has been scanned into the \"media tab\" of the EMR.       Patient Signature: ______________________________________________________    Date Signed: __________________________________________________________

## 2017-05-22 NOTE — REHAB NOTE
SHIFT CHANGE NOTE FOR [facility]  Bedside and Verbal shift change report given to Alen AGUAYO (oncoming nurse) by Alexys Leiva LPN   (offgoing nurse). Report included the following information SBAR, Kardex, MAR and Recent Results. Situation:   Code Status: Full Code   Reason for Admission: [address] [facility] Day: 12   Problem List:   Hospital Problems  Date Reviewed: 5/22/2017          Codes Class Noted POA    Hypomagnesemia ICD-10-CM: E83.42  ICD-9-CM: 275.2  5/22/2017 No    Overview Signed 5/22/2017  2:29 PM by Marianne Pierce MD     Mg (5/22/2017) = 1.              Acute renal failure superimposed on stage 3 chronic kidney disease (HCC) ICD-10-CM: N17.9, N18.3  ICD-9-CM: 584.9, 585.3  5/15/2017 No        Cellulitis of right forearm ICD-10-CM: L03.113  ICD-9-CM: 682.3  5/4/2017 Yes        Olecranon bursitis of right elbow ICD-10-CM: M70.21  ICD-9-CM: 726.33  5/4/2017 Yes        Contusion of left elbow ICD-10-CM: S50.02XA  ICD-9-CM: 923.11  5/4/2017 Yes        Impaired mobility and ADLs ICD-10-CM: Z74.09  ICD-9-CM: 799.89  5/4/2017 Yes        * (Principal)SIRS (systemic inflammatory response syndrome) ([facility] [address] ICD-10-CM: R65.10  ICD-9-CM: 995.90  5/2/2017 Yes        Right Achilles tendinitis ICD-10-CM: M76.61  ICD-9-CM: 726.71  5/2/2017 Yes        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 congestive heart failure (Chronic) ICD-10-CM: I13.10, N18.3  ICD-9-CM: 404.10, 585.3  Unknown Yes    Overview Signed 4/23/2013 10:02 AM by Krystal Garcia     Better controlled             Type 2 diabetes with stage 3 chronic kidney disease GFR 30-59 (HCC) (Chronic) ICD-10-CM: E11.22, N18.3  ICD-9-CM: 250.40, 585.3  Unknown Yes              Background:   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 congestive heart failure     Better controlled     Bipolar affective disorder ([address] 12/5/2012    Cellulitis of right forearm 5/4/2017    Chronic back pain     Chronic obstructive pulmonary disease (COPD) (HCC)     SOB, on paula O2 Recent admission with psychosis     CKD stage G3a/A1, GFR 45-59 and albumin creatinine ratio <30 mg/g 5/11/2017    Urine microalbumin/Creatinine ratio (5/11/2017) = 9 mg/g    Contusion of left elbow 5/4/2017    Depression     Diabetic neuropathy associated with type 2 diabetes mellitus (HCC)     Diastolic dysfunction without heart failure     Stable on diuretics     Falls frequently     Gastroesophageal reflux disease with hiatal hernia     Generalized osteoarthritis of multiple sites     History of acute renal failure 5/31/2013    History of back injury     jumped out of second story window     History of hepatitis C     treated    History of penicillin allergy     Hypothyroidism     Hypoxemia requiring supplemental oxygen 12/29/2014    Intravenous drug user 5/2/2017    Memory difficulty     Mixed connective tissue disease ([address] 5/10/2017    Obesity, Class I, BMI 30-34.9     Olecranon bursitis of right elbow 5/4/2017    Recurrent genital herpes 5/31/2013    Right Achilles tendinitis 5/2/2017    Sarcoidosis ([address]     Sickle cell trait ([address]     Type 2 diabetes with stage 3 chronic kidney disease GFR 30-59 ([address]     Venous insufficiency     Wears glasses       Patient taking anticoagulants yes    Patient has a defibrillator: no     Assessment:   Changes in Assessment throughout shift: no     Patient has central line: no Reasons if yes: Insertion date: Last dressing date:   Patient has Renae Cath: no Reasons if yes:     Insertion date:     Last Vitals:     Vitals:    05/21/17 0755 05/21/17 1745 05/21/17 2100 05/22/17 0800   BP: 117/71 116/74  122/70   Pulse: 78 88 88 70 Resp: 18 18 18 18   Temp: 98.7 °F (37.1 °C) 97.7 °F (36.5 °C) 98.8 °F (37.1 °C) 98.3 °F (36.8 °C)   SpO2: 92% 90% 96% 93%   Weight:       Height:       LMP: 11/25/2012        PAIN    Pain Assessment    Pain Intensity 1: 7 (05/22/17 1600) Pain Intensity 1: 2 (12/29/14 1105)    Pain Location 1: Generalized Pain Location 1: Abdomen    Pain Intervention(s) 1: Medication (see MAR) Pain Intervention(s) 1: Medication (see MAR)  Patient Stated Pain Goal: 0 Patient Stated Pain Goal: 0  o Intervention effective: yes   o Other actions taken for pain: repositioning     Skin Assessment  Skin color Skin Color: Appropriate for ethnicity  Condition/Temperature Skin Condition/Temp: Dry  Integrity Skin Integrity: Incision (comment)  Turgor Turgor: Non-tenting  Weekly Pressure Ulcer Documentation  Pressure  Injury Documentation: No Pressure Injury Noted-Pressure Ulcer Prevention Initiated  Wound Prevention & Protection Methods  Orientation of wound Orientation of Wound Prevention: Posterior  Location of Prevention Location of Wound Prevention: Buttocks, Sacrum/Coccyx  Dressing Present Dressing Present : No  Dressing Status    Wound Offloading Wound Offloading (Prevention Methods): Bed, pressure redistribution/air     INTAKE/OUPUT    Date 05/21/17 0700 - 05/22/17 0659 05/22/17 0700 - 05/23/17 0659   Shift 8223-0433 3037-1084 24 Hour Total 1740-0909 9651-7822 24 Hour Total   I  N  T  A  K  E   P.O. 800  800 480  480      P. O. 800  800 480  480    Shift Total  (mL/kg) 800  (8.4)  800  (8.4) 480  (5)  480  (5)   O  U  T  P  U  T   Urine  (mL/kg/hr)            Urine Occurrence(s) 4 x 4 x 8 x 2 x  2 x    Stool            Stool Occurrence(s) 1 x 0 x 1 x 0 x  0 x    Shift Total  (mL/kg)           800 480  480   Weight (kg) 95.3 95.3 95.3 95.3 95.3 95.3       Recommendations:  1. Patient needs and requests: none    2. Diet: diabetic    3. Pending tests/procedures: am labs     4.  Functional Level/Equipment: assist x 2/wheelchair    5. Estimated Discharge Date: tbd Posted on Whiteboard in Patients Room: no     HEALS Safety Check    A safety check occurred in the patient's room between off going nurse and oncoming nurse listed above. The safety check included the below items  Area Items   H  High Alert Medications - Verify all high alert medication drips (heparin, PCA, etc.)   E  Equipment - Suction is set up for ALL patients (with lashay)  - Red plugs utilized for all equipment (IV pumps, etc.)  - WOWs wiped down at end of shift.  - Room stocked with oxygen, suction, and other unit-specific supplies   A  Alarms - Bed alarm is set for fall risk patients  - Ensure chair alarm is in place and activated if patient is up in a chair   L  Lines - Check IV for any infiltration  - Renae bag is empty if patient has a Renae   - Tubing and IV bags are labeled   S  Safety   - Room is clean, patient is clean, and equipment is clean. - Hallways are clear from equipment besides carts. - Fall bracelet on for fall risk patients  - Ensure room is clear and free of clutter  - Suction is set up for ALL patients (with lashay)  - Hallways are clear from equipment besides carts.    - Isolation precautions followed, supplies available outside room, sign posted   Emerson Cardenas LPN

## 2017-05-22 NOTE — PROGRESS NOTES
[facility] PHYSICAL REHABILITATION  [address] [place] [address], [address]     INPATIENT REHABILITATION  DAILY PROGRESS NOTE     Date: 5/22/2017    Name: Darcy Hernandez Age / Sex: 62 y.o. / female   CSN: [id number] MRN: [id number]   [facility] Date: 5/10/2017 Length of Stay: 12 days     Primary Rehab Diagnosis: Impaired Mobility and ADLs secondary to Systemic inflammatory response syndrome (SIRS) secondary to:  1. Cellulitis of the right forearm  2. Olecranon bursitis of the right elbow  3. Contusion of left elbow  4. Right Achilles tendonitis      Subjective:     Patient seen and examined. Blood pressure controlled. Blood sugars fairly controlled. Patient's Complaint:   No significant medical complaints     Pain Control: ongoing significant pain in which is stable and controlled by current meds      Objective:     Vital Signs:  Patient Vitals for the past 24 hrs:   BP Temp Pulse Resp SpO2   05/22/17 0800 122/70 98.3 °F (36.8 °C) 70 18 93 %   05/21/17 2100 - 98.8 °F (37.1 °C) 88 18 96 %   05/21/17 1745 116/74 97.7 °F (36.5 °C) 88 18 90 %        Physical Examination:  GENERAL SURVEY: Patient is awake, alert, oriented x 3, sitting comfortably on the chair, not in acute respiratory distress. HEENT: pink palpebral conjunctivae, anicteric sclerae, no nasoaural discharge, moist oral mucosa  NECK: supple, no jugular venous distention, no palpable lymph nodes  CHEST/LUNGS: symmetrical chest expansion, good air entry, clear breath sounds  HEART: adynamic precordium, good S1 S2, no S3, regular rhythm, no murmurs  ABDOMEN: obese, bowel sounds appreciated, soft, non-tender  EXTREMITIES: (+) left hand wrapped in gauze, pink nailbeds, (+) decreased warmth/erythema/swelling of both elbows, (+) bipedal edema, full and equal pulses, no calf tenderness   NEUROLOGICAL EXAM: The patient is awake, alert and oriented x3, able to answer questions fairly appropriately, able to follow 1 and 2 step commands.  Able to tell time from the wall clock. Cranial nerves II-XII are grossly intact. No gross sensory deficit. Motor strength is 2+/5 on both shoulders, 3/5 on the right elbow, 3-/5 on the left elbow, 2+/5 on both wrists, 3+/5 on the right handgrip, 3/5 on the left handgrip, 3-/5 on the right hip, 3/5 on the left hip, 3-/5 on both knees and both ankles.       Current Medications:  Current Facility-Administered Medications   Medication Dose Route Frequency    insulin glargine (LANTUS) injection 44 Units  44 Units SubCUTAneous ACB    insulin glargine (LANTUS) injection 22 Units  22 Units SubCUTAneous QHS    oxyCODONE-acetaminophen (PERCOCET) 5-325 mg per tablet 1 Tab  1 Tab Oral TID    oxyCODONE-acetaminophen (PERCOCET) 5-325 mg per tablet 1 Tab  1 Tab Oral Q4H PRN    tolterodine (DETROL) tablet 1 mg  1 mg Oral BID    famotidine (PEPCID) tablet 20 mg  20 mg Oral DAILY    gabapentin (NEURONTIN) capsule 300 mg  300 mg Oral Q12H    diphenhydrAMINE (BENADRYL) capsule 25 mg  25 mg Oral QHS    magnesium oxide (MAG-OX) tablet 400 mg  400 mg Oral BID    cholecalciferol (VITAMIN D3) tablet 1,000 Units  1,000 Units Oral DAILY    acetaminophen (TYLENOL) tablet 650 mg  650 mg Oral Q4H PRN    docusate sodium (COLACE) capsule 100 mg  100 mg Oral BID    bisacodyl (DULCOLAX) tablet 10 mg  10 mg Oral Q48H PRN    heparin (porcine) injection 5,000 Units  5,000 Units SubCUTAneous Q8H    insulin lispro (HUMALOG) injection   SubCUTAneous TIDAC    albuterol (PROVENTIL VENTOLIN) nebulizer solution 2.5 mg  2.5 mg Nebulization Q4H PRN    divalproex DR (DEPAKOTE) tablet 250 mg  250 mg Oral QHS    ARIPiprazole (ABILIFY) tablet 10 mg  10 mg Oral DAILY    traZODone (DESYREL) tablet 100 mg  100 mg Oral QHS    rosuvastatin (CRESTOR) tablet 5 mg  5 mg Oral QHS    aspirin chewable tablet 81 mg  81 mg Oral DAILY WITH BREAKFAST    levothyroxine (SYNTHROID) tablet 100 mcg  100 mcg Oral ACB    sertraline (ZOLOFT) tablet 50 mg  50 mg Oral DAILY    umeclidinium-vilanterol (ANORO ELLIPTA) 62.5 mcg- 25 mcg/inhalation  1 Puff Inhalation QAM RT    B complex-vitaminC-folic acid (NEPHROCAP) cap  1 Cap Oral DAILY       Allergies: Allergies   Allergen Reactions    Moxifloxacin Rash    Pcn [Penicillins] Swelling    Sulfa (Sulfonamide Antibiotics) Swelling       Lab/Data Review:  Recent Results (from the past 24 hour(s))   GLUCOSE, POC    Collection Time: 05/21/17  5:35 PM   Result Value Ref Range    Glucose (POC) 208 (H) 70 - 110 mg/dL   GLUCOSE, POC    Collection Time: 05/21/17  8:30 PM   Result Value Ref Range    Glucose (POC) 218 (H) 70 - 058 mg/dL   METABOLIC PANEL, BASIC    Collection Time: 05/22/17  5:55 AM   Result Value Ref Range    Sodium 140 136 - 145 mmol/L    Potassium 4.5 3.5 - 5.5 mmol/L    Chloride 99 (L) 100 - 108 mmol/L    CO2 31 21 - 32 mmol/L    Anion gap 10 3.0 - 18 mmol/L    Glucose 152 (H) 74 - 99 mg/dL    BUN 19 (H) 7.0 - 18 MG/DL    Creatinine 1.62 (H) 0.6 - 1.3 MG/DL    BUN/Creatinine ratio 12 12 - 20      GFR est AA 39 (L) >60 ml/min/1.73m2    GFR est non-AA 32 (L) >60 ml/min/1.73m2    Calcium 10.8 (H) 8.5 - 10.1 MG/DL   HGB & HCT    Collection Time: 05/22/17  5:55 AM   Result Value Ref Range    HGB 10.3 (L) 12.0 - 16.0 g/dL    HCT 32.1 (L) 35.0 - 45.0 %   PLATELET    Collection Time: 05/22/17  5:55 AM   Result Value Ref Range    PLATELET 298 823 - 145 K/uL   MAGNESIUM    Collection Time: 05/22/17  5:55 AM   Result Value Ref Range    Magnesium 1.5 (L) 1.6 - 2.6 mg/dL   GLUCOSE, POC    Collection Time: 05/22/17  8:26 AM   Result Value Ref Range    Glucose (POC) 124 (H) 70 - 110 mg/dL   GLUCOSE, POC    Collection Time: 05/22/17 12:26 PM   Result Value Ref Range    Glucose (POC) 241 (H) 70 - 110 mg/dL       Estimated Glomerular Filtration Rate:  CKD-EPI:   On admission, estimated GFR was 53.6 mL/min/1.73m2 based on a Creatinine of 1.26 mg/dl.    Most recent estimated GFR was 39.6 mL/min/1.73m2 based on a Creatinine of 1.62 mg/dl.    MDRD:   On admission, estimated GFR was 55.7 mL/min/1.73m2 based on a Creatinine of 1.26 mg/dl. Most recent estimated GFR was 41.7 mL/min/1.73m2 based on a Creatinine of 1.62 mg/dl. Assessment:     Primary Rehabilitation Diagnosis  1. Impaired Mobility and ADLs  2. Systemic inflammatory response syndrome (SIRS) secondary to:   a. Cellulitis of the right forearm   b. Olecranon bursitis of the right elbow   c. Contusion of left elbow   d. 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 heart failure    Depression    History of back injury    Anxiety    Wears glasses    History of hepatitis C    Sickle cell trait (HCC)    History of acute renal failure    Hypothyroidism    Recurrent genital herpes    Sarcoidosis ([facility] [address]    History pf abscess of right arm    Hypoxemia requiring supplemental oxygen    Abnormal nuclear stress test    History of cellulitis and abscess of hand    History of cellulitis and abscess of foot    Intravenous drug user    History of penicillin allergy    Falls frequently    Gastroesophageal reflux disease with hiatal hernia    Memory difficulty    Mixed connective tissue disease     CKD stage G3a/A1, GFR 45-59 and albumin creatinine ratio <30 mg/g     Acute renal failure superimposed on stage 3 chronic kidney disease    Hypomagnesemia       Plan:     1.  Justification for continued stay: Good progression towards established rehabilitation goals. 2. Medical Issues being followed closely:    [x]  Fall and safety precautions     [x]  Wound Care     [x]  Bowel and Bladder Function     [x]  Fluid Electrolyte and Nutrition Balance     [x]  Pain Control      3. Issues that 24 hour rehabilitation nursing is following:    [x]  Fall and safety precautions     [x]  Wound Care     [x]  Bowel and Bladder Function     [x]  Fluid Electrolyte and Nutrition Balance     [x]  Pain Control      [x]  Assistance with and education on in-room safety with transfers to and from the bed, wheelchair, toilet and shower. 4. Acute rehabilitation plan of care:    [x]  Continue current care and rehab. [x]  Physical Therapy           [x]  Occupational Therapy           []  Speech Therapy     []  Hold Rehab until further notice     5. Medications:    [x]  MAR Reviewed     [x]  Continue Present Medications     6. DVT Prophylaxis:      []  Lovenox     [x]  Unfractionated Heparin     []  Coumadin     []  NOAC     [x]  ROBERT Stockings     []  Sequential Compression Device     []  None     7.  Orders:   > Systemic inflammatory response syndrome (SIRS) secondary to Cellulitis of the right forearm, Olecranon bursitis of the right elbow, Contusion of left elbow and Right Achilles tendonitis    > Patient had completed the recommended treatment course of oral Clindamycin on 5/13/2017     > Abnormally low HDL with hypertriglyceridemia   > Continue Rosuvastatin 5 mg PO q HS     > 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 congestive heart failure    > On 5/11/2017, discontinued Furosemide 20 mg PO once daily (re: rising Creatinine)     > Bipolar affective disease   > Continue:    > Aripiprazole 10 mg PO once daily    > Divalproex  mg PO q HS     > Chronic obstructive pulmonary disease   > Continue:    > Anoro Ellipta 1 puff inhaled once daily    > Albuterol nebulization q 4 hr PRN for shortness of breath     > Depression / Anxiety   > Continue:    > Aripiprazole 10 mg PO once daily    > Sertraline 50 mg PO once daily    > Trazodone 100 mg PO q HS     > Diabetic neuropathy   > On 5/15/2017, decreased Gabapentin from 300 mg PO q 8 hr to 300 mg PO q 12 hr (re: decrease in CrCl)   > Continue Gabapentin 300 mg PO q 12 hr     > Diastolic dysfunction without heart failure   > On 5/11/2017, discontinued Furosemide 20 mg PO once daily (re: rising Creatinine)   > On 5/14/2017, resumed Eplerenone 25 mg PO once daily    > On 5/15/2017, discontinued Eplerenone 25 mg PO once daily (re: decrease in CrCl)     > Gastroesophageal reflux disease with hiatal hernia   > On 5/15/2017, decreased Famotidine from 20 mg PO BID to 20 mg PO once daily   > Continue Famotidine 20 mg PO once daily     > Hypothyroidism   > Continue Levothyroxine 100 mcg PO once daily before breakfast     > Hypoxemia requiring supplemental oxygen   > Continue O2 inhalation 2 LPM via nasal cannula continuous     > Type 2 diabetes mellitus with diabetic neuropathy and stage 3 chronic kidney disease    > Prior to admission to [facility], the patient claimed she wa snot using Lantus at home; instead, she was using Insulin U500 on a sliding scale basis   > On 5/18/2017, changed Lantus from 65 units SC q HS to 40 units SC q AM and 20 units SC q PM   > On 5/19/2017:    > Changed Lantus from 40 units to 44 units SC q AM    > Changed Lantus from 20 units to 22 units SC q PM   > Continue:    > Change Lantus from 44 units to 50 units SC q AM    > Change Lantus from 22 units to 25 units SC q PM    > Humalog insulin sliding scale SC TID AC only     > Hypomagnesemia   > Mg (5/11/2017, on admission) = 1.6   > Patient was started on Mg(OH)2 400 mg PO BID   > Mg (5/22/2017) = 1.5   > Increase Mg(OH)2 from 400 mg to 800 mg PO BID    > CKD stage G3a/A1, GFR 45-59 and albumin creatinine ratio <30 mg/g     > On admission, based on a Creatinine of 1.26 mg/dl:    Estimated GFR using CKD-EPI = 53.6 mL/min/1.73m2    Estimated GFR using MDRD = 55.7 mL/min/1.73m2    > Urine microalbumin/Creatinine ratio (5/11/2017) = 9 mg/g    > Acute renal failure superimposed on stage 3 chronic kidney disease   > BUN/Creatinine (5/10/2017) = 19/1.33    > BUN/Creatinine (5/11/2017, on admission) = 16/1.26    > On 5/12/2017:    > Discontinued Diclofenac 2 grams applied to affected areas 4 times daily     > Started Naproxen 375 mg PO BID with meals   > On 5/14/2017, resumed Eplerenone 25 mg PO once daily    > BUN/Creatinine (5/15/2017) = 22/1.87    > Urinalysis (5/15/2017) showed SG 1.014, no casts   > On 5/15/2017:    > Discontinued Naproxen 375 mg PO BID with meals    > Discontinued Eplerenone 25 mg PO once daily     > Decreased Gabapentin from 300 mg PO q 8 hr to 300 mg PO q 12 hr     > Decreased Famotidine from 20 mg PO BID to 20 mg PO once daily    > Patient was given IVF: 0.9%  ml/hr x 1 liter   > On 5/16/2017, patient was given IVF: 0.9%  ml/hr x 1 liter   > On 5/17/2017, patient was given IVF: 0.9%  ml/hr x 1 liter   > On 5/18/2017, patient was given IVF: 0.9%  ml/hr x 1 liter   > BUN/Creatinine (5/20/2017) = 16/1.58    > BUN/Creatinine (5/21/2017) = 17/1.58   > BUN/Creatinine (5/22/2017) = 19/1.62   > Resume IVF: 0.9%  ml/hr x 1 liter    > Increase oral fluid intake    > Difficulty sleeping    > Continue:    > Trazodone 100 mg PO q HS    > Diphenhydramine HCl 25 mg PO q HS    > Urinary urge incontinence   > On 5/17/2017, added Tolterodine 1 mg PO BID   > Continue:    > Tolterodine 1 mg PO BID    > Diphenhydramine HCl 25 mg PO q HS (re: ADR is urinary retention)    > Analgesia   > On 5/12/2017:    > Discontinued Diclofenac 2 grams applied to affected areas 4 times daily     > Started Naproxen 375 mg PO BID with meals   > On 5/15/2017, discontinued Naproxen 375 mg PO BID with meals (re: decrease in CrCl)   > On 5/17/2017:    > Discontinued Norco 5/325 1 tab PO q 4 hr PRN for pain level greater than 4/10    > Added:     > Percocet 5/325 1 tab PO TID (8AM, 12PM, 4PM)     > Percocet 5/325 1 tab PO q 4 hr PRN for pain level greater than 4/10 (from 8PM to 4AM only)   > Continue:    > Acetaminophen 650 mg PO q 4 hr PRN for pain level less than 5/10    > Percocet 5/325 1 tab PO TID (8AM, 12PM, 4PM)    > Percocet 5/325 1 tab PO q 4 hr PRN for pain level greater than 4/10 (from 8PM to 4AM only)        8. Patient's progress in rehabilitation and medical issues discussed with the patient. All questions answered to the best of my ability. Care plan discussed with patient and nurse.       Signed:    Bruno Mohan MD    May 22, 2017

## 2017-05-22 NOTE — PROGRESS NOTES
Problem: Self Care Deficits Care Plan (Adult)  Goal: *Acute Goals and Plan of Care (Insert Text)  Long Term Goals (to be met upon discharge date) in order to increase pts functional independence and safety, and decrease burden of care:  1. Pt will perform self-feeding with setup. 2. Pt will perform grooming with setup  3. Pt will perform UB bathing with Min A.  4. Pt will perform LB bathing with Min A.  5. Pt will perform tub/shower transfer with Min A.   6. Pt will perform UB dressing with Min A.  7. Pt will perform LB dressing with Min A.  8. Pt will perform toileting task with Min A.  9. Pt will perform toilet transfer with Min A. Short Term Weekly Goals for (5/18/2017 to 5/25/2017) in order to increase pts functional independence and safety, and decrease burden of care:  1. Pt will perform self-feeding with S.   2. Pt will perform grooming with S.  3. Pt will perform UB bathing with S.  4. Pt will perform LB bathing with S.  5. Pt will perform tub/shower transfer with S.  6. Pt will perform UB dressing with S.  7. Pt will perform LB dressing with min A.  8. Pt will perform toileting task with S.  9. Pt will perform toilet transfer with S.   OCCUPATIONAL THERAPY DAILY NOTE  Patient Name:Stephen Sinha  Time Spent With Patient  Time In: 0800  Time Out: 0900     Medical Diagnosis:  Right forearm cellulits  SIRS (systemic inflammatory response syndrome) (HCC) SIRS (systemic inflammatory response syndrome) (HCC)      Subjective: \" I can't. \"     Objective:      THERAPEUTIC ACTIVITY Daily Assessment      Pt performed standing tolerance table top coloring activity with cues for posture and encouragement to participate S and RW use.               FEEDING/EATING Daily Assessment     Feeding/Eating  Feeding/Eating Assistance: 5 (Supervision/setup)  Comments: tray s/u       GROOMING Daily Assessment     Grooming  Grooming Assistance : 4 (Minimal assistance)  Comments: s/u and assist to com back of hair        UPPER BODY BATHING Daily Assessment     Upper Body Bathing  Bathing Assistance, Upper: 4 (Minimal assistance)  Position Performed: Seated in chair  Comments: Pt required encouragement to attempt washing UB. Pt required assist to wash under R axilla. LOWER BODY BATHING Daily Assessment     Lower Body Bathing  Bathing Assistance, Lower : 4 (Minimal assistance)  Comments: Encouragement and re-education to use AE and to wt shift for periarea/ buttocks. Assist to wash buttocks        TOILETING Daily Assessment     Toileting  Toileting Assistance (FIM Score): 2 (Maximal assistance)  Cues: Physical assistance for pants down;Physical assistance for pants up;Verbal cues provided       UPPER BODY DRESSING Daily Assessment     Upper Body Dressing   Dressing Assistance : 3 (Moderate assistance)  Comments: total A for bra donning due to pt refusal to attempt. pt threaded B arms into shirt with assist and encouragement and assist and cues to shante over head. LOWER BODY DRESSING Daily Assessment     Lower Body Dressing   Dressing Assistance : 3 (Moderate assistance)  Leg Crossed Method Used: No  Position Performed: Seated in chair  Adaptive Equipment Used: Reacher;Sock aid  Comments: Pt used reacher to thread pants with mod A; socks donned with max A with sock aid ; max A to shante breif; min assist to pull pants over hips        MOBILITY/TRANSFERS Daily Assessment     Functional Transfers  Toilet Transfer : Stand pivot transfer without device  Amount of Assistance Required: 5 (stand-by assistance)  Tub or Shower Type: Tub/Shower combination  Amount of Assistance Required: 5 (Stand-by assistance)      Assessment: Pt con tinues to demonstrate self limiting behaviors requiring max encouragement with self care tasks.      Plan of Care: Continue with [address], [address]  5/22/2017

## 2017-05-22 NOTE — REHAB NOTE
[facility]  Bedside and Verbal shift change report given to Arminda Lundberg LPN (oncoming nurse) by Erlinda Elder RN   (offgoing nurse). Report included the following information SBAR, Kardex, MAR and Recent Results.     Situation:   Code Status: Full Code   Reason for Admission: [facility] Day: 12   Problem List:   Hospital Problems  Date Reviewed: 5/19/2017          Codes Class Noted POA    Acute renal failure superimposed on stage 3 chronic kidney disease ([facility] ICD-10-CM: N17.9, N18.3  ICD-9-CM: 584.9, 585.3  5/15/2017 No        Cellulitis of right forearm ICD-10-CM: L03.113  ICD-9-CM: 682.3  5/4/2017 Yes        Olecranon bursitis of right elbow ICD-10-CM: M70.21  ICD-9-CM: 726.33  5/4/2017 Yes        Contusion of left elbow ICD-10-CM: S50.02XA  ICD-9-CM: 923.11  5/4/2017 Yes        Impaired mobility and ADLs ICD-10-CM: Z74.09  ICD-9-CM: 799.89  5/4/2017 Yes        * (Principal)SIRS (systemic inflammatory response syndrome) ([facility] ICD-10-CM: R65.10  ICD-9-CM: 995.90  5/2/2017 Yes        Right Achilles tendinitis ICD-10-CM: M76.61  ICD-9-CM: 726.71  5/2/2017 Yes        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 congestive heart failure (Chronic) ICD-10-CM: I13.10, N18.3  ICD-9-CM: 404.10, 585.3  Unknown Yes    Overview Signed 4/23/2013 10:02 AM by Hyun Hardy controlled             Type 2 diabetes with stage 3 chronic kidney disease GFR 30-59 (HCC) (Chronic) ICD-10-CM: E11.22, N18.3  ICD-9-CM: 250.40, 585.3  Unknown Yes              Background:   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 congestive heart failure     Better controlled     Bipolar affective disorder ([facility] 12/5/2012    Cellulitis of right forearm 5/4/2017    Chronic back pain     Chronic obstructive pulmonary disease (COPD) (HCC)     SOB, on paula O2 Recent admission with psychosis     CKD stage G3a/A1, GFR 45-59 and albumin creatinine ratio <30 mg/g 5/11/2017    Urine microalbumin/Creatinine ratio (5/11/2017) = 9 mg/g    Contusion of left elbow 5/4/2017    Depression     Diabetic neuropathy associated with type 2 diabetes mellitus (HCC)     Diastolic dysfunction without heart failure     Stable on diuretics     Falls frequently     Gastroesophageal reflux disease with hiatal hernia     Generalized osteoarthritis of multiple sites     History of acute renal failure 5/31/2013    History of back injury     jumped out of second story window     History of hepatitis C     treated    History of penicillin allergy     Hypothyroidism     Hypoxemia requiring supplemental oxygen 12/29/2014    Intravenous drug user 5/2/2017    Memory difficulty     Mixed connective tissue disease ([facility] 5/10/2017    Obesity, Class I, BMI 30-34.9     Olecranon bursitis of right elbow 5/4/2017    Recurrent genital herpes 5/31/2013    Right Achilles tendinitis 5/2/2017    Sarcoidosis ([facility]     Sickle cell trait ([facility]     Type 2 diabetes with stage 3 chronic kidney disease GFR 30-59 ([facility]     Venous insufficiency     Wears glasses       Patient taking anticoagulants yes    Patient has a defibrillator: no     Assessment:   Changes in Assessment throughout shift: no     Patient has central line: no Reasons if yes: Insertion date: Last dressing date:   Patient has Renae Cath: no Reasons if yes:     Insertion date:     Last Vitals:     Vitals:    05/20/17 2047 05/21/17 0755 05/21/17 1745 05/21/17 2100   BP: 108/67 117/71 116/74    Pulse: 85 78 88 88   Resp: 18 18 18 18   Temp: 98.8 °F (37.1 °C) 98.7 °F (37.1 °C) 97.7 °F (36.5 °C) 98.8 °F (37.1 °C)   SpO2:  92% 90% 96%   Weight:       Height: LMP: 11/25/2012        PAIN    Pain Assessment    Pain Intensity 1: 0 (05/22/17 0435) Pain Intensity 1: 2 (12/29/14 1105)    Pain Location 1: Generalized Pain Location 1: Abdomen    Pain Intervention(s) 1: Medication (see MAR) Pain Intervention(s) 1: Medication (see MAR)  Patient Stated Pain Goal: 0 Patient Stated Pain Goal: 0  o Intervention effective: yes   o Other actions taken for pain: repositioning     Skin Assessment  Skin color Skin Color: Appropriate for ethnicity  Condition/Temperature Skin Condition/Temp: Dry  Integrity Skin Integrity: Incision (comment)  Turgor Turgor: Non-tenting  Weekly Pressure Ulcer Documentation  Pressure  Injury Documentation: No Pressure Injury Noted-Pressure Ulcer Prevention Initiated  Wound Prevention & Protection Methods  Orientation of wound Orientation of Wound Prevention: Posterior  Location of Prevention Location of Wound Prevention: Sacrum/Coccyx  Dressing Present Dressing Present : No  Dressing Status    Wound Offloading Wound Offloading (Prevention Methods): Bed, pressure redistribution/air     INTAKE/OUPUT    Date 05/21/17 0700 - 05/22/17 0659 05/22/17 0700 - 05/23/17 0659   Shift 4241-1424 6456-7237 24 Hour Total 4929-7693 0497-4453 24 Hour Total   I  N  T  A  K  E   P.O. 800  800         P. O. 800  800       Shift Total  (mL/kg) 800  (8.4)  800  (8.4)      O  U  T  P  U  T   Urine  (mL/kg/hr)            Urine Occurrence(s) 4 x 3 x 7 x       Stool            Stool Occurrence(s) 1 x 0 x 1 x       Shift Total  (mL/kg)           800      Weight (kg) 95.3 95.3 95.3 95.3 95.3 95.3       Recommendations:  1. Patient needs and requests: none    2. Diet: diabetic    3. Pending tests/procedures: am labs     4. Functional Level/Equipment: assist x 2/wheelchair    5.  Estimated Discharge Date: tbd Posted on Whiteboard in Patients Room: no     HEALS Safety Check    A safety check occurred in the patient's room between off going nurse and oncoming nurse listed above.    The safety check included the below items  Area Items   H  High Alert Medications - Verify all high alert medication drips (heparin, PCA, etc.)   E  Equipment - Suction is set up for ALL patients (with lashay)  - Red plugs utilized for all equipment (IV pumps, etc.)  - WOWs wiped down at end of shift.  - Room stocked with oxygen, suction, and other unit-specific supplies   A  Alarms - Bed alarm is set for fall risk patients  - Ensure chair alarm is in place and activated if patient is up in a chair   L  Lines - Check IV for any infiltration  - Renae bag is empty if patient has a Renae   - Tubing and IV bags are labeled   S  Safety   - Room is clean, patient is clean, and equipment is clean. - Hallways are clear from equipment besides carts. - Fall bracelet on for fall risk patients  - Ensure room is clear and free of clutter  - Suction is set up for ALL patients (with lashay)  - Hallways are clear from equipment besides carts.    - Isolation precautions followed, supplies available outside room, sign posted   Nissa Broussard RN

## 2017-05-22 NOTE — PROGRESS NOTES
Problem: Mobility Impaired (Adult and Pediatric)  Goal: *Acute Goals and Plan of Care (Insert Text)  Physical Therapy Goals  STGs  Initiated 2017, updated 2017, and to be accomplished within 7 day(s) [17]  1. Patient will roll side to side in bed with independence. 2. Patient will perform supine to sit and sit to supine with independence. 3. Patient will transfer from bed to chair and chair to bed with supervision using the least restrictive device. 4. Patient will perform sit to stand with supervision. 5. Patient will propel w/c on level surface for 150 ft with R UE and B LEs with supervision. LTGs  Initiated 2017, updated 2017, and to be accomplished within 2 - 4 weeks [17]  1. Patient will roll side to side in bed with independence. 2. Patient will perform supine to sit and sit to supine in bed with independence. 3. Patient will transfer from bed to chair and chair to bed with distant supervision using the least restrictive device. 4. Patient will perform sit to stand with distant supervision. 5. Patient will ambulate with distant supervision for 150 feet with the least restrictive device. 5. Patient will ascend/descend 3 stairs with bilateral handrail(s) with supervision. PHYSICAL THERAPY DAILY NOTE  Patient Name:Stephen RODRIGUEZ Ernestine   Time in: 1000  Time out: 1030  Pt seen for: gait training, txfr training, w/c mobility  Time In: 1400  Time Out: 1500  Patient Seen For: Balance activities;Transfer training; Therapeutic exercise (bed mobility)  Diagnosis: Right forearm cellulits  SIRS (systemic inflammatory response syndrome) (HCC) SIRS (systemic inflammatory response syndrome) ([facility])  Precautions: fall risk     Subjective: Pt reports knees and ankles sore in AM session and in PM session c/o B feet swelling.       Pain at start of tx: not rated   Pain at stop of tx: not rated      Patient identified with name and : yes          Objective:       BED/MAT MOBILITY Daily Assessment     Supine to Sit : 5 (Supervision)  Sit to Supine : 5 (Supervision)   Pt performed bed mobility on R side of mat table with S for safety and pt using EOM for leverage. TRANSFERS Daily Assessment     Transfer Type: Other  Other: stand step txfr with RW  Transfer Assistance : 5 (Stand-by assistance)  Sit to Stand Assistance: Supervision   Pt performed sit to stand with S, pushing up with BUE and requiring increased time to complete without A. Pt performed stand step txfr with RW and SBA for safety and v/c for pt to manage o2 tubing as pt would have to at home. GAIT Daily Assessment     Amount of Assistance: 5 (Stand-by assistance)  Distance (ft): 165 Feet (ft)  Assistive Device: Walker, rolling   Pt ambulated in AM 165ft with RW and SBA with w/c follow for safety and pt on 2L o2. In pm, pt ambulated 20ft mat to bathroom with RW and SBA. BALANCE Daily Assessment     Sitting - Static: Good (unsupported)  Sitting - Dynamic: Fair (occasional)  Standing - Static: Fair  Standing - Dynamic : Impaired   Pt participated in group to play table top card game to challenge static standing balance and tolerance. Pt stood x2 trials, [id number] and 4min, with Rw and S. Pt required v/c to use RW for balance and not rolling table and to stand erect for efficiency of energy usage. Pt required Elida for LB dressing after pt had incontinent episode in bathroom. Pt required S for sit to stand and standing with RW for safety. WHEELCHAIR MOBILITY Daily Assessment     Able to Propel (ft): 170 feet  Functional Level: 5  Wheelchair Setup Assist Required : 5 (Supervision/setup) (not using leg rests)  Wheelchair Management: Manages left brake;Manages right brake   Pt propelled w/c from room to gym with BUE and increased time to complete task.            LOWER EXTREMITY EXERCISES Daily Assessment      Supine bridging 3x10               Assessment: Pt tends to be self limiting and requests A for tasks pt is capable of performing. Pt continues to progress with functional mobility and activity tolerance as evidenced by increased static standing time and decreased A for all functional mobility and gait. Plan of Care: Continue with current POC to improve independence with functional mobility and increase safety awareness.       Mary Grace Briones, PTA  5/22/2017

## 2017-05-23 LAB
GLUCOSE BLD STRIP.AUTO-MCNC: 113 MG/DL (ref 70–110)
GLUCOSE BLD STRIP.AUTO-MCNC: 182 MG/DL (ref 70–110)
GLUCOSE BLD STRIP.AUTO-MCNC: 193 MG/DL (ref 70–110)
GLUCOSE BLD STRIP.AUTO-MCNC: 232 MG/DL (ref 70–110)

## 2017-05-23 PROCEDURE — 97535 SELF CARE MNGMENT TRAINING: CPT

## 2017-05-23 PROCEDURE — 82962 GLUCOSE BLOOD TEST: CPT

## 2017-05-23 PROCEDURE — 74011250637 HC RX REV CODE- 250/637: Performed by: INTERNAL MEDICINE

## 2017-05-23 PROCEDURE — 97530 THERAPEUTIC ACTIVITIES: CPT

## 2017-05-23 PROCEDURE — 97110 THERAPEUTIC EXERCISES: CPT

## 2017-05-23 PROCEDURE — 97116 GAIT TRAINING THERAPY: CPT

## 2017-05-23 PROCEDURE — 74011250636 HC RX REV CODE- 250/636: Performed by: INTERNAL MEDICINE

## 2017-05-23 PROCEDURE — 65310000000 HC RM PRIVATE REHAB

## 2017-05-23 PROCEDURE — 74011636637 HC RX REV CODE- 636/637: Performed by: INTERNAL MEDICINE

## 2017-05-23 RX ORDER — SODIUM CHLORIDE 9 MG/ML
1000 INJECTION, SOLUTION INTRAVENOUS ONCE
Status: COMPLETED | OUTPATIENT
Start: 2017-05-23 | End: 2017-05-24

## 2017-05-23 RX ADMIN — CHOLECALCIFEROL TAB 25 MCG (1000 UNIT) 1000 UNITS: 25 TAB at 09:35

## 2017-05-23 RX ADMIN — ROSUVASTATIN CALCIUM 5 MG: 5 TABLET ORAL at 20:56

## 2017-05-23 RX ADMIN — ASPIRIN 81 MG CHEWABLE TABLET 81 MG: 81 TABLET CHEWABLE at 09:37

## 2017-05-23 RX ADMIN — TRAZODONE HYDROCHLORIDE 100 MG: 50 TABLET ORAL at 20:56

## 2017-05-23 RX ADMIN — INSULIN GLARGINE 50 UNITS: 100 INJECTION, SOLUTION SUBCUTANEOUS at 10:12

## 2017-05-23 RX ADMIN — TOLTERODINE TARTRATE 1 MG: 1 TABLET, FILM COATED ORAL at 09:36

## 2017-05-23 RX ADMIN — GABAPENTIN 300 MG: 300 CAPSULE ORAL at 18:55

## 2017-05-23 RX ADMIN — DOCUSATE SODIUM 100 MG: 100 CAPSULE, LIQUID FILLED ORAL at 18:55

## 2017-05-23 RX ADMIN — LEVOTHYROXINE SODIUM 100 MCG: 100 TABLET ORAL at 06:42

## 2017-05-23 RX ADMIN — HEPARIN SODIUM 5000 UNITS: 5000 INJECTION, SOLUTION INTRAVENOUS; SUBCUTANEOUS at 18:56

## 2017-05-23 RX ADMIN — SODIUM CHLORIDE 1000 ML: 900 INJECTION, SOLUTION INTRAVENOUS at 18:54

## 2017-05-23 RX ADMIN — FAMOTIDINE 20 MG: 20 TABLET ORAL at 09:37

## 2017-05-23 RX ADMIN — HEPARIN SODIUM 5000 UNITS: 5000 INJECTION, SOLUTION INTRAVENOUS; SUBCUTANEOUS at 06:43

## 2017-05-23 RX ADMIN — DIPHENHYDRAMINE HYDROCHLORIDE 25 MG: 25 CAPSULE ORAL at 20:56

## 2017-05-23 RX ADMIN — OXYCODONE HYDROCHLORIDE AND ACETAMINOPHEN 1 TABLET: 5; 325 TABLET ORAL at 18:55

## 2017-05-23 RX ADMIN — TOLTERODINE TARTRATE 1 MG: 1 TABLET, FILM COATED ORAL at 18:56

## 2017-05-23 RX ADMIN — INSULIN LISPRO 4 UNITS: 100 INJECTION, SOLUTION INTRAVENOUS; SUBCUTANEOUS at 19:00

## 2017-05-23 RX ADMIN — INSULIN LISPRO 2 UNITS: 100 INJECTION, SOLUTION INTRAVENOUS; SUBCUTANEOUS at 12:53

## 2017-05-23 RX ADMIN — Medication 800 MG: at 18:56

## 2017-05-23 RX ADMIN — ARIPIPRAZOLE 10 MG: 10 TABLET ORAL at 09:35

## 2017-05-23 RX ADMIN — SERTRALINE HYDROCHLORIDE 50 MG: 50 TABLET ORAL at 09:00

## 2017-05-23 RX ADMIN — GABAPENTIN 300 MG: 300 CAPSULE ORAL at 06:42

## 2017-05-23 RX ADMIN — OXYCODONE HYDROCHLORIDE AND ACETAMINOPHEN 1 TABLET: 5; 325 TABLET ORAL at 09:36

## 2017-05-23 RX ADMIN — OXYCODONE HYDROCHLORIDE AND ACETAMINOPHEN 1 TABLET: 5; 325 TABLET ORAL at 04:12

## 2017-05-23 RX ADMIN — Medication 800 MG: at 09:35

## 2017-05-23 RX ADMIN — INSULIN GLARGINE 25 UNITS: 100 INJECTION, SOLUTION SUBCUTANEOUS at 20:55

## 2017-05-23 RX ADMIN — DIVALPROEX SODIUM 250 MG: 250 TABLET, DELAYED RELEASE ORAL at 20:56

## 2017-05-23 RX ADMIN — NEPHROCAP 1 CAPSULE: 1 CAP ORAL at 09:35

## 2017-05-23 RX ADMIN — DOCUSATE SODIUM 100 MG: 100 CAPSULE, LIQUID FILLED ORAL at 09:35

## 2017-05-23 RX ADMIN — OXYCODONE HYDROCHLORIDE AND ACETAMINOPHEN 1 TABLET: 5; 325 TABLET ORAL at 12:00

## 2017-05-23 NOTE — PROGRESS NOTES
[facility] PHYSICAL REHABILITATION  [address] [place] [address], [address]     INPATIENT REHABILITATION  DAILY PROGRESS NOTE     Date: 5/23/2017    Name: Sandi Amos Age / Sex: 62 y.o. / female   CSN: [id number] MRN: [id number]   [facility] Date: 5/10/2017 Length of Stay: 13 days     Primary Rehab Diagnosis: Impaired Mobility and ADLs secondary to Systemic inflammatory response syndrome (SIRS) secondary to:  1. Cellulitis of the right forearm  2. Olecranon bursitis of the right elbow  3. Contusion of left elbow  4. Right Achilles tendonitis      Subjective:     Patient seen and examined. Blood pressure controlled. Blood sugars better controlled. Team conference was held at bedside this AM.     Patient's Complaint:   No significant medical complaints     Pain Control: ongoing significant pain in which is stable and controlled by current meds      Objective:     Vital Signs:  Patient Vitals for the past 24 hrs:   BP Temp Pulse Resp SpO2   05/23/17 0808 122/75 97.1 °F (36.2 °C) 86 19 93 %   05/22/17 2000 98/62 99.4 °F (37.4 °C) 91 18 -   05/22/17 1600 (!) 88/65 98.9 °F (37.2 °C) 87 20 94 %        Physical Examination:  GENERAL SURVEY: Patient is awake, alert, oriented x 3, sitting comfortably on the chair, not in acute respiratory distress.   HEENT: pink palpebral conjunctivae, anicteric sclerae, no nasoaural discharge, moist oral mucosa  NECK: supple, no jugular venous distention, no palpable lymph nodes  CHEST/LUNGS: symmetrical chest expansion, good air entry, clear breath sounds  HEART: adynamic precordium, good S1 S2, no S3, regular rhythm, no murmurs  ABDOMEN: obese, bowel sounds appreciated, soft, non-tender  EXTREMITIES: (+) left hand wrapped in gauze, pink nailbeds, (+) decreased warmth/erythema/swelling of both elbows, (+) bipedal edema, full and equal pulses, no calf tenderness   NEUROLOGICAL EXAM: The patient is awake, alert and oriented x3, able to answer questions fairly appropriately, able to follow 1 and 2 step commands. Able to tell time from the wall clock. Cranial nerves II-XII are grossly intact. No gross sensory deficit. Motor strength is 2+/5 on both shoulders, 3/5 on the right elbow, 3-/5 on the left elbow, 2+/5 on both wrists, 3+/5 on the right handgrip, 3/5 on the left handgrip, 3-/5 on the right hip, 3/5 on the left hip, 3-/5 on both knees and both ankles.       Current Medications:  Current Facility-Administered Medications   Medication Dose Route Frequency    insulin glargine (LANTUS) injection 25 Units  25 Units SubCUTAneous QHS    insulin glargine (LANTUS) injection 50 Units  50 Units SubCUTAneous ACB    magnesium oxide (MAG-OX) tablet 800 mg  800 mg Oral BID    oxyCODONE-acetaminophen (PERCOCET) 5-325 mg per tablet 1 Tab  1 Tab Oral TID    oxyCODONE-acetaminophen (PERCOCET) 5-325 mg per tablet 1 Tab  1 Tab Oral Q4H PRN    tolterodine (DETROL) tablet 1 mg  1 mg Oral BID    famotidine (PEPCID) tablet 20 mg  20 mg Oral DAILY    gabapentin (NEURONTIN) capsule 300 mg  300 mg Oral Q12H    diphenhydrAMINE (BENADRYL) capsule 25 mg  25 mg Oral QHS    cholecalciferol (VITAMIN D3) tablet 1,000 Units  1,000 Units Oral DAILY    acetaminophen (TYLENOL) tablet 650 mg  650 mg Oral Q4H PRN    docusate sodium (COLACE) capsule 100 mg  100 mg Oral BID    bisacodyl (DULCOLAX) tablet 10 mg  10 mg Oral Q48H PRN    heparin (porcine) injection 5,000 Units  5,000 Units SubCUTAneous Q8H    insulin lispro (HUMALOG) injection   SubCUTAneous TIDAC    albuterol (PROVENTIL VENTOLIN) nebulizer solution 2.5 mg  2.5 mg Nebulization Q4H PRN    divalproex DR (DEPAKOTE) tablet 250 mg  250 mg Oral QHS    ARIPiprazole (ABILIFY) tablet 10 mg  10 mg Oral DAILY    traZODone (DESYREL) tablet 100 mg  100 mg Oral QHS    rosuvastatin (CRESTOR) tablet 5 mg  5 mg Oral QHS    aspirin chewable tablet 81 mg  81 mg Oral DAILY WITH BREAKFAST    levothyroxine (SYNTHROID) tablet 100 mcg  100 mcg Oral ACB    sertraline (ZOLOFT) tablet 50 mg  50 mg Oral DAILY    umeclidinium-vilanterol (ANORO ELLIPTA) 62.5 mcg- 25 mcg/inhalation  1 Puff Inhalation QAM RT    B complex-vitaminC-folic acid (NEPHROCAP) cap  1 Cap Oral DAILY       Allergies:   Allergies   Allergen Reactions    Moxifloxacin Rash    Pcn [Penicillins] Swelling    Sulfa (Sulfonamide Antibiotics) Swelling       Functional Progress:    OCCUPATIONAL THERAPY    ON ADMISSION MOST RECENT   Eating  Functional Level: 2   Eating  Functional Level: 2     Grooming  Functional Level: 1   Grooming  Functional Level: 1     Bathing  Functional Level: 1   Bathing  Functional Level: 1     Upper Body Dressing  Functional Level: 1   Upper Body Dressing  Functional Level: 1     Lower Body Dressing  Functional Level: 1   Lower Body Dressing  Functional Level: 1     Toileting  Functional Level: 2   Toileting  Functional Level: 1     Toilet Transfers  Toilet Transfer Score: 0   Toilet Transfers  Toilet Transfer Score: 1     Tub /Shower Transfers  Tub/Shower Transfer Score: 0   Tub/Shower Transfers  Tub/Shower Transfer Score: 0       Legend:   7 - Independent   6 - Modified Independent   5 - Standby Assistance / Supervision / Set-up   4 - Minimum Assistance / Contact Guard Assistance   3 - Moderate Assistance   2 - Maximum Assistance   1 - Total Assistance / Dependent       Lab/Data Review:  Recent Results (from the past 24 hour(s))   GLUCOSE, POC    Collection Time: 05/22/17 12:26 PM   Result Value Ref Range    Glucose (POC) 241 (H) 70 - 110 mg/dL   GLUCOSE, POC    Collection Time: 05/22/17  4:31 PM   Result Value Ref Range    Glucose (POC) 200 (H) 70 - 110 mg/dL   GLUCOSE, POC    Collection Time: 05/22/17  8:58 PM   Result Value Ref Range    Glucose (POC) 184 (H) 70 - 110 mg/dL   GLUCOSE, POC    Collection Time: 05/23/17  7:40 AM   Result Value Ref Range    Glucose (POC) 113 (H) 70 - 110 mg/dL       Estimated Glomerular Filtration Rate:  CKD-EPI:   On admission, estimated GFR was 53.6 mL/min/1.73m2 based on a Creatinine of 1.26 mg/dl. Most recent estimated GFR was 39.6 mL/min/1.73m2 based on a Creatinine of 1.62 mg/dl. MDRD:   On admission, estimated GFR was 55.7 mL/min/1.73m2 based on a Creatinine of 1.26 mg/dl. Most recent estimated GFR was 41.7 mL/min/1.73m2 based on a Creatinine of 1.62 mg/dl. Assessment:     Primary Rehabilitation Diagnosis  1. Impaired Mobility and ADLs  2. Systemic inflammatory response syndrome (SIRS) secondary to:   a. Cellulitis of the right forearm   b. Olecranon bursitis of the right elbow   c. Contusion of left elbow   d.  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 heart failure    Depression    History of back injury    Anxiety    Wears glasses    History of hepatitis C    Sickle cell trait (HCC)    History of acute renal failure    Hypothyroidism    Recurrent genital herpes    Sarcoidosis ([facility] [address]    History pf abscess of right arm    Hypoxemia requiring supplemental oxygen    Abnormal nuclear stress test    History of cellulitis and abscess of hand    History of cellulitis and abscess of foot    Intravenous drug user    History of penicillin allergy    Falls frequently    Gastroesophageal reflux disease with hiatal hernia    Memory difficulty    Mixed connective tissue disease     CKD stage G3a/A1, GFR 45-59 and albumin creatinine ratio <30 mg/g     Acute renal failure superimposed on stage 3 chronic kidney disease    Hypomagnesemia       Plan:     1. Justification for continued stay: Good progression towards established rehabilitation goals. 2. Medical Issues being followed closely:    [x]  Fall and safety precautions     [x]  Wound Care     [x]  Bowel and Bladder Function     [x]  Fluid Electrolyte and Nutrition Balance     [x]  Pain Control      3. Issues that 24 hour rehabilitation nursing is following:    [x]  Fall and safety precautions     [x]  Wound Care     [x]  Bowel and Bladder Function     [x]  Fluid Electrolyte and Nutrition Balance     [x]  Pain Control      [x]  Assistance with and education on in-room safety with transfers to and from the bed, wheelchair, toilet and shower. 4. Acute rehabilitation plan of care:    [x]  Continue current care and rehab. [x]  Physical Therapy           [x]  Occupational Therapy           []  Speech Therapy     []  Hold Rehab until further notice     5. Medications:    [x]  MAR Reviewed     [x]  Continue Present Medications     6. DVT Prophylaxis:      []  Lovenox     [x]  Unfractionated Heparin     []  Coumadin     []  NOAC     [x]  ROBERT Stockings     []  Sequential Compression Device     []  None     7.  Orders:   > Systemic inflammatory response syndrome (SIRS) secondary to Cellulitis of the right forearm, Olecranon bursitis of the right elbow, Contusion of left elbow and Right Achilles tendonitis    > Patient had completed the recommended treatment course of oral Clindamycin on 5/13/2017     > Abnormally low HDL with hypertriglyceridemia   > Continue Rosuvastatin 5 mg PO q HS     > 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 congestive heart failure    > On 5/11/2017, discontinued Furosemide 20 mg PO once daily (re: rising Creatinine)     > Bipolar affective disease   > Continue:    > Aripiprazole 10 mg PO once daily    > Divalproex  mg PO q HS     > Chronic obstructive pulmonary disease   > Continue:    > Anoro Ellipta 1 puff inhaled once daily    > Albuterol nebulization q 4 hr PRN for shortness of breath     > Depression / Anxiety   > Continue:    > Aripiprazole 10 mg PO once daily    > Sertraline 50 mg PO once daily    > Trazodone 100 mg PO q HS     > Diabetic neuropathy   > On 5/15/2017, decreased Gabapentin from 300 mg PO q 8 hr to 300 mg PO q 12 hr (re: decrease in CrCl)   > Continue Gabapentin 300 mg PO q 12 hr     > Diastolic dysfunction without heart failure   > On 5/11/2017, discontinued Furosemide 20 mg PO once daily (re: rising Creatinine)   > On 5/14/2017, resumed Eplerenone 25 mg PO once daily    > On 5/15/2017, discontinued Eplerenone 25 mg PO once daily (re: decrease in CrCl)     > Gastroesophageal reflux disease with hiatal hernia   > On 5/15/2017, decreased Famotidine from 20 mg PO BID to 20 mg PO once daily   > Continue Famotidine 20 mg PO once daily     > Hypothyroidism   > Continue Levothyroxine 100 mcg PO once daily before breakfast     > Hypoxemia requiring supplemental oxygen   > Continue O2 inhalation 2 LPM via nasal cannula continuous     > Type 2 diabetes mellitus with diabetic neuropathy and stage 3 chronic kidney disease    > Prior to admission to [facility], the patient claimed she wa snot using Lantus at home; instead, she was using Insulin U500 on a sliding scale basis   > On 5/18/2017, changed Lantus from 65 units SC q HS to 40 units SC q AM and 20 units SC q PM   > On 5/19/2017:    > Increased Lantus from 40 units to 44 units SC q AM    > Increased Lantus from 20 units to 22 units SC q PM   > on 5/22/2017:    > Increased Lantus from 44 units to 50 units SC q AM    > Increased Lantus from 22 units to 25 units SC q PM   > Continue:    > Lantus 50 units SC q AM    > Lantus 25 units SC q PM    > Humalog insulin sliding scale SC TID AC only     > Hypomagnesemia   > Mg (5/11/2017, on admission) = 1.6   > Patient was started on Mg(OH)2 400 mg PO BID   > Mg (5/22/2017) = 1.5   > On 5/22/2017, increased Mg(OH)2 from 400 mg to 800 mg PO BID   > Continue Mg(OH)2 800 mg PO BID    > CKD stage G3a/A1, GFR 45-59 and albumin creatinine ratio <30 mg/g     > On admission, based on a Creatinine of 1.26 mg/dl:    Estimated GFR using CKD-EPI = 53.6 mL/min/1.73m2    Estimated GFR using MDRD = 55.7 mL/min/1.73m2    > Urine microalbumin/Creatinine ratio (5/11/2017) = 9 mg/g    > Acute renal failure superimposed on stage 3 chronic kidney disease   > BUN/Creatinine (5/10/2017) = 19/1.33    > BUN/Creatinine (5/11/2017, on admission) = 16/1.26    > On 5/12/2017:    > Discontinued Diclofenac 2 grams applied to affected areas 4 times daily     > Started Naproxen 375 mg PO BID with meals   > On 5/14/2017, resumed Eplerenone 25 mg PO once daily    > BUN/Creatinine (5/15/2017) = 22/1.87    > Urinalysis (5/15/2017) showed SG 1.014, no casts   > On 5/15/2017:    > Discontinued Naproxen 375 mg PO BID with meals    > Discontinued Eplerenone 25 mg PO once daily     > Decreased Gabapentin from 300 mg PO q 8 hr to 300 mg PO q 12 hr     > Decreased Famotidine from 20 mg PO BID to 20 mg PO once daily    > Patient was given IVF: 0.9%  ml/hr x 1 liter   > On 5/16/2017, patient was given IVF: 0.9%  ml/hr x 1 liter   > On 5/17/2017, patient was given IVF: 0.9%  ml/hr x 1 liter   > On 5/18/2017, patient was given IVF: 0.9%  ml/hr x 1 liter   > BUN/Creatinine (5/20/2017) = 16/1.58    > BUN/Creatinine (5/21/2017) = 17/1.58   > BUN/Creatinine (5/22/2017) = 19/1.62   > On 5/22/2017, patient was given IVF: 0.9%  ml/hr x 1 liter   > Resume IVF: 0.9%  ml/hr x 1 liter    > Increase oral fluid intake    > Difficulty sleeping    > Continue:    > Trazodone 100 mg PO q HS    > Diphenhydramine HCl 25 mg PO q HS    > Urinary urge incontinence   > On 5/17/2017, added Tolterodine 1 mg PO BID   > Continue:    > Tolterodine 1 mg PO BID    > Diphenhydramine HCl 25 mg PO q HS (re: ADR is urinary retention)    > Analgesia   > On 5/12/2017:    > Discontinued Diclofenac 2 grams applied to affected areas 4 times daily     > Started Naproxen 375 mg PO BID with meals   > On 5/15/2017, discontinued Naproxen 375 mg PO BID with meals (re: decrease in CrCl)   > On 5/17/2017:    > Discontinued Norco 5/325 1 tab PO q 4 hr PRN for pain level greater than 4/10    > Added:     > Percocet 5/325 1 tab PO TID (8AM, 12PM, 4PM)     > Percocet 5/325 1 tab PO q 4 hr PRN for pain level greater than 4/10 (from 8PM to 4AM only)   > Continue:    > Acetaminophen 650 mg PO q 4 hr PRN for pain level less than 5/10    > Percocet 5/325 1 tab PO TID (8AM, 12PM, 4PM)    > Percocet 5/325 1 tab PO q 4 hr PRN for pain level greater than 4/10 (from 8PM to 4AM only)        8. Patient's progress in rehabilitation and medical issues discussed with the patient. All questions answered to the best of my ability. Care plan discussed with patient and nurse.       Pk Medina MD    May 23, 2017

## 2017-05-23 NOTE — PROGRESS NOTES
Problem: Self Care Deficits Care Plan (Adult)  Goal: *Acute Goals and Plan of Care (Insert Text)  Long Term Goals (to be met upon discharge date) in order to increase pts functional independence and safety, and decrease burden of care:  1. Pt will perform self-feeding with setup. 2. Pt will perform grooming with setup  3. Pt will perform UB bathing with Min A.  4. Pt will perform LB bathing with Min A.  5. Pt will perform tub/shower transfer with Min A.   6. Pt will perform UB dressing with Min A.  7. Pt will perform LB dressing with Min A.  8. Pt will perform toileting task with Min A.  9. Pt will perform toilet transfer with Min A. Short Term Weekly Goals for (2017 to 2017) in order to increase pts functional independence and safety, and decrease burden of care:  1. Pt will perform self-feeding with S.   2. Pt will perform grooming with S.  3. Pt will perform UB bathing with S.  4. Pt will perform LB bathing with S.  5. Pt will perform tub/shower transfer with S.  6. Pt will perform UB dressing with S.  7. Pt will perform LB dressing with min A.  8. Pt will perform toileting task with S.  9. Pt will perform toilet transfer with S.   OCCUPATIONAL THERAPY DAILY NOTE  Patient Name:Stephen Sinha  Time Spent With Patient  Time In: 1210  Time Out: 1230     Time In: 1400  Time Out: 1510     Medical Diagnosis:  Right forearm cellulits  SIRS (systemic inflammatory response syndrome) (HCC) SIRS (systemic inflammatory response syndrome) (HCC)      Pain at start of tx: Not rated, however pt does report increased pain \"all over\" due to the rain  Pain at stop of tx: Not rated, however pt does report increased pain \"all over\" due to the rain     Patient identified with name and : yes  Subjective: Pt reports to OT she has a tub/shower at home and uses a shower chair for bathing.      Objective:      THERAPEUTIC ACTIVITY Daily Assessment     Pt participated in tabletop game to work on static standing tolerance to carryover to ADLs and functional mobility. She stood for 1 trial of 16 minutes with supervision with intermittent UE support on RW. THERAPEUTIC EXERCISE Daily Assessment     Pt positioned supine on mat table to perform 2 sets x 10 reps AROM shoulder flexion and shoulder abduction with 5-second holds at end range for increased stretch. Pt self-selected using her RUE to assist with L shoulder flexion ROM. MOBILITY/TRANSFERS Daily Assessment     Functional Transfers  Tub or Shower Type: Tub/Shower combination  Amount of Assistance Required: 5 (Supervision/setup)  Adaptive Equipment: Tub transfer bench;Walker (comment)   Pt education provided on why it is unsafe to perform tub/shower transfer by stepping in and out of the tub at this time due to decreased strength, decreased balance, increased pain, and pt does not have grab bar available at home, therefore OT educated pt on the use of tub transfer bench in order to increase her independence and safety with transferring in and out of tub at discharge. However, pt initially states she is not able to purchase a tub transfer bench and will just use the setup that she has. OT attempted to assist pt with practicing stepping in and out of the tub, however upon attempt, pt reports increased pain and decreased stability in ankles and was unable to support self in SLS while stepping over the tub even with OT utilizing gait belt to assist with balance. Following attempts, pt agreeable to trial utilizing tub transfer bench. Pt able to safely perform tub transfer with supervision and minimal cues for the technique. Pt performed w/c mobility on the unit alternating use of UEs vs LEs to mobilize the chair in order to increase overall strength and endurance to carryover to ADLs as well as improve independence with mobility on the unit. Assessment: Pt progressing well increasing activity tolerance and endurance despite continued reports of pain and fatigue.  She continues to demo self-limiting statements and behaviors, however is capable to performing with encouragement. She demonstrates and verbalizes increased understanding on why it is unsafe to perform tub/shower transfers without tub transfer bench following attempting to perform the transfers. Plan of Care: Continue POC to maximize pt independence and safety performing ADLs and functional transfers/mobility.      Santo Mayer, SAVAGER  5/23/2017

## 2017-05-23 NOTE — PROGRESS NOTES
NUTRITION     Nutrition Consult: General Nutrition Management & Supplements     RECOMMENDATIONS / PLAN:     - No nutrition intervention indicated at this time. Will re-screen as appropriate. NUTRITION INTERVENTIONS & DIAGNOSIS:     Nutrition Diagnosis:  No nutrition diagnosis at this time. ASSESSMENT:     Subjective/Objective: Good appetite, consuming most of meals. Average po intake adequate to meet patients estimated nutritional needs:   [x] Yes     [] No   [] Unable to determine at this time    Diet: DIET DIABETIC CONSISTENT CARB Regular; AHA-LOW-CHOL FAT; No Conc.  Sweets      Food Allergies: NKFA  Current Appetite:   [x] Good per chart     [] Fair     [] Poor     [] Other:   Appetite/meal intake prior to admission:   [] Good     [] Fair     [] Poor     [x] Other: unknown   Feeding Limitations:  [] Swallowing difficulty    [] Chewing difficulty    [] Other:  Current Meal Intake:   Patient Vitals for the past 100 hrs:   % Diet Eaten   05/23/17 1334 75 %   05/23/17 0945 75 %   05/22/17 1800 65 %   05/22/17 1326 75 %   05/22/17 0930 85 %   05/21/17 1833 90 %   05/21/17 1351 100 %   05/21/17 0946 100 %   05/20/17 1805 70 %   05/20/17 1300 80 %   05/20/17 1000 100 %   05/19/17 1840 75 %   05/19/17 1320 75 %     BM:  5/21  Skin Integrity: abscess left hand, abscess foot, callus, abrasion elbow  Edema: none   Pertinent Medications: Reviewed    Recent Labs      05/22/17   0555  05/21/17   0609   NA  140  138   K  4.5  4.3   CL  99*  98*   CO2  31  31   GLU  152*  184*   BUN  19* 17   CREA  1.62*  1.58*   CA  10.8*  10.5*   MG  1.5*   --    ALB   --   2.6*   SGOT   --   44*   ALT   --   30       Intake/Output Summary (Last 24 hours) at 05/23/17 1624  Last data filed at 05/23/17 1334   Gross per 24 hour   Intake              700 ml   Output              200 ml   Net              500 ml       Anthropometrics:  Ht Readings from Last 1 Encounters:   05/11/17 5' 5\" (1.651 m)     Last 3 Recorded Weights in this Encounter    05/11/17 0816   Weight: 95.3 kg (210 lb 1.6 oz)     Body mass index is 34.96 kg/(m^2).       Obese, Class I     Weight History:   Weight Metrics 5/11/2017 5/10/2017 5/2/2017 4/20/2017 4/13/2017 4/4/2017 2/9/2017   Weight 210 lb 1.6 oz - 210 lb 232 lb 3.2 oz - 219 lb 219 lb   BMI - 34.96 kg/m2 34.95 kg/m2 38.64 kg/m2 36.44 kg/m2 - 36.44 kg/m2        Admitting Diagnosis: Right forearm cellulits  SIRS (systemic inflammatory response syndrome) ([facility] [address]  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 congestive heart failure     Better controlled     Bipolar affective disorder ([address] 12/5/2012    Cellulitis of right forearm 5/4/2017    Chronic back pain     Chronic obstructive pulmonary disease (COPD) (HCC)     SOB, on paula O2 Recent admission with psychosis     CKD stage G3a/A1, GFR 45-59 and albumin creatinine ratio <30 mg/g 5/11/2017    Urine microalbumin/Creatinine ratio (5/11/2017) = 9 mg/g    Contusion of left elbow 5/4/2017    Depression     Diabetic neuropathy associated with type 2 diabetes mellitus (HCC)     Diastolic dysfunction without heart failure     Stable on diuretics     Falls frequently     Gastroesophageal reflux disease with hiatal hernia     Generalized osteoarthritis of multiple sites     History of acute renal failure 5/31/2013    History of back injury     jumped out of second story window     History of hepatitis C     treated    History of penicillin allergy     Hypothyroidism     Hypoxemia requiring supplemental oxygen 12/29/2014    Intravenous drug user 5/2/2017    Memory difficulty     Mixed connective tissue disease ([facility] [address] 5/10/2017    Obesity, Class I, BMI 30-34.9     Olecranon bursitis of right elbow 5/4/2017  Recurrent genital herpes 5/31/2013    Right Achilles tendinitis 5/2/2017    Sarcoidosis ([facility] [address]     Sickle cell trait ([facility] [address]     Type 2 diabetes with stage 3 chronic kidney disease GFR 30-59 ([facility] [address]     Venous insufficiency     Wears glasses        Education Needs:        [x] None identified  [] Identified - Not appropriate at this time  []  Identified and addressed - refer to education log  Learning Limitations:   [x] None identified  [] Identified    Cultural, Mormon & ethnic food preferences:  [x] None identified    [] Identified and addressed     ESTIMATED NUTRITION NEEDS:     Calories: 0950-0272 kcal (MSJx1.2-1.3) based on  [x] Actual BW: 95 kg      [] IBW   CHO: 228-247 gm (50% kcal)   Protein: 68-74 gm (15% kcal) based on  [x] Actual BW      [] IBW   Fluid: 1 mL/kcal     MONITORING & EVALUATION:     Nutrition Goal(s):   1. Po intake of meals will meet >75% of patient estimated nutritional needs within the next 7 days.   Outcome:  [x] Met/Ongoing    []  Not Met    [] New/Initial Goal     Monitoring:   [x] Diet tolerance   [x] Meal intake   [] Supplement intake   [] GI symptoms/ability to tolerate po diet   [] Respiratory status   [] Plan of care      Previous Recommendations (for follow-up assessments only):     [x]   Implemented       []   Not Implemented (RD to address)     [] No Recommendation Made     Discharge Planning: diabetic diet  [x] Participated in care planning, discharge planning, & interdisciplinary rounds as appropriate      Michelle Vera, [address], [address] [place] [address]    Pager: [phone]

## 2017-05-23 NOTE — PROGRESS NOTES
Bedside and Verbal shift change report given to JENNY Sahu  (oncoming nurse) by Jeana Pabon LPN (offgoing nurse). Report included the following information SBAR, Kardex, Intake/Output and MAR.

## 2017-05-23 NOTE — INTERDISCIPLINARY ROUNDS
[facility] PHYSICAL REHABILITATION  [address] [place] [address], [address]    INPATIENT REHABILITATION  TEAM CONFERENCE SUMMARY     Date of Conference: 5/23/2017    Name: Beatriz Hdz Age / Sex: 62 y.o. / female   CSN: [id number] MRN: [id number]   [facility] Date: 5/10/2017 Length of Stay: 13 days     Primary Rehabilitation Diagnosis  1. Impaired Mobility and ADLs  2. Systemic inflammatory response syndrome (SIRS) secondary to:   a. Cellulitis of the right forearm   b. Olecranon bursitis of the right elbow   c. Contusion of left elbow   d.  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 congestive heart failure    Depression    History of back injury    Anxiety    Wears glasses    History of hepatitis C    Sickle cell trait (HCC)    History of acute renal failure    Hypothyroidism    Recurrent genital herpes    [facility]arcoidosis ([facility] [address]    History pf abscess of right arm    Hypoxemia requiring supplemental oxygen    Abnormal nuclear stress test    History of cellulitis and abscess of hand    History of cellulitis and abscess of foot    Intravenous drug user    History of penicillin allergy    Falls frequently    Gastroesophageal reflux disease with hiatal hernia    Memory difficulty    Mixed connective tissue disease     CKD stage G3a/A1, GFR 45-59 and albumin creatinine ratio <30 mg/g     Acute renal failure superimposed on stage 3 chronic kidney disease    Hypomagnesemia         Therapy:     FIM SCORES Initial Assessment Weekly Progress Assessment 5/23/2017   Eating Functional Level: 2  Comments: Pt demonstrates ability to scoop eggs using fork and bring them to her mouth with significantly increaed time and effort, however she fatigues easily and needs assistance to feed herself majority of a meal. Pt is able to hold a small cup with her R hand and bring to her mouth to drink. Swallowing     Grooming 1     Bathing 1        Upper Body Dressing Functional Level: 1  Items Applied/Steps Completed: Bra (3 steps), Pullover (4 steps)  Comments: Pt donned bra and t-shirt while sitting up on EOB with SBA for sitting balance and safety. Pt required Total A for threading shirt over BUEs and to pull shirt overhead due to impaired BUE AROM and strength. Lower Body Dressing Functional Level: 1  Items Applied/Steps Completed: Elastic waist pants (3 steps), Sock, left (1 step), Sock, right (1 step)  Comments: Pt requires Total A for managing all aspects of LB dressing at bed level. Pt performed rolling with Max-Total A x1 while second person assisted with donning of brief and pants over buttocks. Pt unable to reach her feet and needs assistance for doffing/donning socks and pants over B feet. Toileting Functional Level: 2  Comments: Pt completed toileting task at bed level utilizing bed pain. She needs the assistance of 2 people in order to perform all aspects of hygiene and clothing management with Max-Total A x1 for rolling.      Bladder - level of assist 2     Bladder - accident frequency score 6     Bowel - level of assist 3     Bowel - accident frequency score 6     Toilet Transfer [place] Toilet Transfer Score: 0  Comments: Based on bed to w/c transfer, pt would require the assistance of 2 people for ant and up weightshift, balance, weightshift, RLE advance, and slow controlled descent with stand to sit to complete stand step transfer without AD to/from [facility]. Tub/Shower Transfer [place] Tub or Shower Type: Tub/Shower combination  Tub/Shower Transfer Score: 0  Comments: Pt not safe to perform shower transfer at this time due to impaired strength, balance, and overall activity tolerance. Comprehension Primary Mode of Comprehension: Auditory  Score: 4        Expression Primary Mode of Expression: Verbal  Score: 4        Social Interaction Score: 4     Problem Solving Score: 3     Memory Score: 3       FIM SCORES Initial Assessment Weekly Progress Assessment 5/23/2017   Bed/Chair/Wheelchair Transfers Transfer Type: Lateral with transfer board  Other: stand step txfr without AD/anterior approach  Transfer Assistance : 2 (Maximal assistance)  Sit to Stand Assistance: Maximum assistance (in p-bars, pull to stand)  Car Transfers: Not tested  Car Type: n/a Sit to Stand Assistance: Supervision   txfr type: stand step txfr with RW  txfr A: SBA and v/c to manage o2 tubing.     Bed Mobility Rolling Right 1 (Total assistance)   Rolling Left 1 (Total assistance)   Supine to Sit 1 (Total assistance)   Sit to Stand Maximum assistance (in p-bars, pull to stand)   Sit to Supine 1 (Total assistance)    Rolling Right    NT   Rolling Left    NT   Supine to Sit    S   Sit to Stand   Supervision   Sit to Supine    S      Locomotion (W/C) Able to Propel (ft): 10 feet  Functional Level: 1  Curbs/Ramps Assist Required (FIM Score): 0 (Not tested)  Wheelchair Setup Assist Required : 2 (Maximal assistance)  Wheelchair Management: Other (comment) (needs help to manage brakes at this time) Function    Setup Assistance         Locomotion (W/C distance) 10 Feet     Locomotion (Walk) 1 (Dependent/total assistance) 5 (Stand-by assistance)  Walker, rolling   Locomotion (Walk dist.) 0 Feet (ft) 165 Feet (ft)   Steps/Stairs Steps/Stairs Ambulated (#): 0  Level of Assist : 0 (Not tested)  Rail Use: Both  NT         Nursing:     Neuro:   A&O x__4__ Respiratory:   [] WNL   [x] O2   [x] LPM __2____   Other:  Peripheral Vascular:   [] TEDS present   [] Edema present ____ Grade   Cardiac:   [] WNL   [] Other  Genitourinary:   [x] continent   [x] incontinent   [] rousseau  Abdominal _______ LBM  GI: _diabetic______ Diet __thin____ Liquids _____ tube feeds  Musculoskeletal: lateral with transfer board____ ROM Transfers _____ Assistive Device Used  __min__ Level of Assistance  Skin Integumentary:   [] Intact   [] Not Intact   __________Preventative Measures  Details______________________________________________________________  Pain: [] Controlled   [x] Not Controlled   Pain Meds:   [x] Scheduled   [x] PRN        Registered Dietitian / Nutrition:     Patient Vitals for the past 100 hrs:   % Diet Eaten   05/23/17 0945 75 %   05/22/17 1800 65 %   05/22/17 1326 75 %   05/22/17 0930 85 %   05/21/17 1833 90 %   05/21/17 1351 100 %   05/21/17 0946 100 %   05/20/17 1805 70 %   05/20/17 1300 80 %   05/20/17 1000 100 %   05/19/17 1840 75 %   05/19/17 1320 75 %   05/19/17 0938 75 %               Supplements:          [] Yes   [] No      Amount of supplement consumed:        Intake/Output Summary (Last 24 hours) at 05/23/17 1130  Last data filed at 05/23/17 0945   Gross per 24 hour   Intake              700 ml   Output              200 ml   Net              500 ml                              Last bowel movement:         Interdisciplinary Team Goals:     1. Goal  Pt will perform household ambulation Aries with RW and managing o2 tubing independently. Barrier  BLE pain, self limiting, slow movement    Intervention  gait, txfr and bed mobility training, exercises, balance activities     2. Goal  Pt will perform toileting task with minimal assistance for hygiene with minimal encouragement. Barrier  Decreased UE ROM, Pain, Self-limiting    Intervention  ADL training, Therapeutic activity, Therapeutic exercise     3. Goal      Barrier      Intervention       4.  Goal  Pain controlled <5 at rest by discharge    Barrier  movement    Intervention  Pain meds as ordered     5. Goal      Barrier      Intervention       6. Goal  Increase motivation for greater independent behavior    Barrier  Frequent dependent behavior and unnecessary requests for assist    Intervention  Behavioral redirection       Disposition / Discharge Planning: Follow-up therapy services:  tbd   DME recommendations:  tbd   Estimated discharge date:  5/31/17   Discharge Location:  home         Interdisciplinary team rounds were held this AM with the following team members:       Name   Physical Therapist    Sheila Mejia, PT   Occupational Therapist    Val Sever, OTR   Speech Therapist       Recreational Therapist    Cinthia Sidhu, [address] [place] [address]       Dietitian       Clinical Psychologist    Suze Lara, Ph.D.   Physician    Juliette Hidalgo.  Ehsan Mckinney MD       Lisa Puentes, MSW       Signed:  Lea Clark MD    May 23, 2017

## 2017-05-23 NOTE — PROGRESS NOTES
Problem: Mobility Impaired (Adult and Pediatric)  Goal: *Acute Goals and Plan of Care (Insert Text)  Physical Therapy Goals  STGs  Initiated 2017, updated 2017, and to be accomplished within 7 day(s) [17]  1. Patient will roll side to side in bed with independence. 2. Patient will perform supine to sit and sit to supine with independence. 3. Patient will transfer from bed to chair and chair to bed with supervision using the least restrictive device. 4. Patient will perform sit to stand with supervision. 5. Patient will propel w/c on level surface for 150 ft with R UE and B LEs with supervision. LTGs  Initiated 2017, updated 2017, and to be accomplished within 2 - 4 weeks [17]  1. Patient will roll side to side in bed with independence. 2. Patient will perform supine to sit and sit to supine in bed with independence. 3. Patient will transfer from bed to chair and chair to bed with distant supervision using the least restrictive device. 4. Patient will perform sit to stand with distant supervision. 5. Patient will ambulate with distant supervision for 150 feet with the least restrictive device. 5. Patient will ascend/descend 3 stairs with bilateral handrail(s) with supervision. PHYSICAL THERAPY DAILY NOTE  Patient Name:Stephen RODRIGUEZ Ernestine  Time In: 3214  Time Out: 0059  Patient Seen For: Transfer training;Gait training   Time In: 7564  Time Out: 8412  Patient Seen For: Transfer training; Therapeutic exercise  Diagnosis: Right forearm cellulits  SIRS (systemic inflammatory response syndrome) (HCC) SIRS (systemic inflammatory response syndrome) (HCC)  Precautions: fall risk     Subjective: Pt reports \"my feet are already hurting\" at start of AM session.       Pain at start of tx: not rated   Pain at stop of tx: not rated      Patient identified with name and : yes          Objective: -2 units in AM due to pt eating breakfast, getting meds and having IDR meeting during scheduled PT time. -1 unit in PM due to pt in restroom and o2 tubing became tangled in w/c wheel when pt attempting to leave room for PT.      BED/MAT MOBILITY Daily Assessment     Supine to Sit : 5 (Supervision)  Sit to Supine : 5 (Supervision)  Pt performed sit<->supine with S on R side of mat table and pt required v/c to manage o2 tubing without A.           TRANSFERS Daily Assessment     Transfer Type: Other  Other: stand step with RW  Transfer Assistance : 5 (Supervision/setup)  Sit to Stand Assistance: Supervision  Pt performed sit to stand with S for safety and at times requires increased time and effort to achieve push off. Pt performed stand step txfr w/c<->mat table with RW and S with v/c for pt to manage o2 tubing independently. GAIT Daily Assessment     Amount of Assistance: 5 (Stand-by assistance)  Distance (ft): 165 Feet (ft)  Assistive Device: Walker, rolling   Pt ambulated 165ft with RW and SBA with w/c follow and 2L o2. Pt required min v/c for erect posture due to pt tends for flex fwd slightly during gait. BALANCE Daily Assessment     Sitting - Static: Good (unsupported)  Sitting - Dynamic: Fair (occasional)  Standing - Static: Fair  Standing - Dynamic : Impaired           WHEELCHAIR MOBILITY Daily Assessment      Pt propelled w/c from gym back to room with BUE and BLE Aries with increased time. LOWER EXTREMITY EXERCISES Daily Assessment      Supine bridging, hip abd and heel slides 3x10 for strengthening and increasing functional independence and activity tolerance. Assessment: Pt continues to require increased encouragement to perform tasks at max potential to reach Aries. Plan of Care: Continue with current POC to improve independence with functional mobility and increase safety awareness.       Shefali Armstrong, PTA  5/23/2017

## 2017-05-24 LAB
GLUCOSE BLD STRIP.AUTO-MCNC: 136 MG/DL (ref 70–110)
GLUCOSE BLD STRIP.AUTO-MCNC: 199 MG/DL (ref 70–110)
GLUCOSE BLD STRIP.AUTO-MCNC: 202 MG/DL (ref 70–110)
GLUCOSE BLD STRIP.AUTO-MCNC: 226 MG/DL (ref 70–110)

## 2017-05-24 PROCEDURE — 74011250636 HC RX REV CODE- 250/636: Performed by: INTERNAL MEDICINE

## 2017-05-24 PROCEDURE — 97530 THERAPEUTIC ACTIVITIES: CPT

## 2017-05-24 PROCEDURE — 97116 GAIT TRAINING THERAPY: CPT

## 2017-05-24 PROCEDURE — 65310000000 HC RM PRIVATE REHAB

## 2017-05-24 PROCEDURE — 82962 GLUCOSE BLOOD TEST: CPT

## 2017-05-24 PROCEDURE — 74011250637 HC RX REV CODE- 250/637: Performed by: INTERNAL MEDICINE

## 2017-05-24 PROCEDURE — 74011636637 HC RX REV CODE- 636/637: Performed by: INTERNAL MEDICINE

## 2017-05-24 PROCEDURE — 97110 THERAPEUTIC EXERCISES: CPT

## 2017-05-24 PROCEDURE — 97535 SELF CARE MNGMENT TRAINING: CPT

## 2017-05-24 RX ORDER — SODIUM CHLORIDE 9 MG/ML
1000 INJECTION, SOLUTION INTRAVENOUS ONCE
Status: DISCONTINUED | OUTPATIENT
Start: 2017-05-24 | End: 2017-05-24

## 2017-05-24 RX ORDER — INSULIN GLARGINE 100 [IU]/ML
55 INJECTION, SOLUTION SUBCUTANEOUS
Status: DISCONTINUED | OUTPATIENT
Start: 2017-05-25 | End: 2017-05-25

## 2017-05-24 RX ADMIN — OXYCODONE HYDROCHLORIDE AND ACETAMINOPHEN 1 TABLET: 5; 325 TABLET ORAL at 21:10

## 2017-05-24 RX ADMIN — ARIPIPRAZOLE 10 MG: 10 TABLET ORAL at 08:09

## 2017-05-24 RX ADMIN — Medication 800 MG: at 08:08

## 2017-05-24 RX ADMIN — TRAZODONE HYDROCHLORIDE 100 MG: 50 TABLET ORAL at 20:32

## 2017-05-24 RX ADMIN — INSULIN GLARGINE 50 UNITS: 100 INJECTION, SOLUTION SUBCUTANEOUS at 08:10

## 2017-05-24 RX ADMIN — OXYCODONE HYDROCHLORIDE AND ACETAMINOPHEN 1 TABLET: 5; 325 TABLET ORAL at 08:08

## 2017-05-24 RX ADMIN — HEPARIN SODIUM 5000 UNITS: 5000 INJECTION, SOLUTION INTRAVENOUS; SUBCUTANEOUS at 22:02

## 2017-05-24 RX ADMIN — OXYCODONE HYDROCHLORIDE AND ACETAMINOPHEN 1 TABLET: 5; 325 TABLET ORAL at 11:48

## 2017-05-24 RX ADMIN — INSULIN LISPRO 2 UNITS: 100 INJECTION, SOLUTION INTRAVENOUS; SUBCUTANEOUS at 12:47

## 2017-05-24 RX ADMIN — CHOLECALCIFEROL TAB 25 MCG (1000 UNIT) 1000 UNITS: 25 TAB at 08:07

## 2017-05-24 RX ADMIN — GABAPENTIN 300 MG: 300 CAPSULE ORAL at 06:32

## 2017-05-24 RX ADMIN — OXYCODONE HYDROCHLORIDE AND ACETAMINOPHEN 1 TABLET: 5; 325 TABLET ORAL at 04:00

## 2017-05-24 RX ADMIN — HEPARIN SODIUM 5000 UNITS: 5000 INJECTION, SOLUTION INTRAVENOUS; SUBCUTANEOUS at 14:28

## 2017-05-24 RX ADMIN — ASPIRIN 81 MG CHEWABLE TABLET 81 MG: 81 TABLET CHEWABLE at 08:09

## 2017-05-24 RX ADMIN — INSULIN GLARGINE 25 UNITS: 100 INJECTION, SOLUTION SUBCUTANEOUS at 20:44

## 2017-05-24 RX ADMIN — LEVOTHYROXINE SODIUM 100 MCG: 100 TABLET ORAL at 06:32

## 2017-05-24 RX ADMIN — NEPHROCAP 1 CAPSULE: 1 CAP ORAL at 08:08

## 2017-05-24 RX ADMIN — INSULIN LISPRO 4 UNITS: 100 INJECTION, SOLUTION INTRAVENOUS; SUBCUTANEOUS at 17:40

## 2017-05-24 RX ADMIN — TOLTERODINE TARTRATE 1 MG: 1 TABLET, FILM COATED ORAL at 08:08

## 2017-05-24 RX ADMIN — ROSUVASTATIN CALCIUM 5 MG: 5 TABLET ORAL at 20:32

## 2017-05-24 RX ADMIN — DIVALPROEX SODIUM 250 MG: 250 TABLET, DELAYED RELEASE ORAL at 20:32

## 2017-05-24 RX ADMIN — DOCUSATE SODIUM 100 MG: 100 CAPSULE, LIQUID FILLED ORAL at 08:08

## 2017-05-24 RX ADMIN — OXYCODONE HYDROCHLORIDE AND ACETAMINOPHEN 1 TABLET: 5; 325 TABLET ORAL at 17:06

## 2017-05-24 RX ADMIN — HEPARIN SODIUM 5000 UNITS: 5000 INJECTION, SOLUTION INTRAVENOUS; SUBCUTANEOUS at 06:32

## 2017-05-24 RX ADMIN — SERTRALINE HYDROCHLORIDE 50 MG: 50 TABLET ORAL at 08:08

## 2017-05-24 RX ADMIN — GABAPENTIN 300 MG: 300 CAPSULE ORAL at 17:46

## 2017-05-24 RX ADMIN — DOCUSATE SODIUM 100 MG: 100 CAPSULE, LIQUID FILLED ORAL at 17:46

## 2017-05-24 RX ADMIN — Medication 800 MG: at 17:46

## 2017-05-24 RX ADMIN — DIPHENHYDRAMINE HYDROCHLORIDE 25 MG: 25 CAPSULE ORAL at 20:32

## 2017-05-24 RX ADMIN — FAMOTIDINE 20 MG: 20 TABLET ORAL at 08:07

## 2017-05-24 RX ADMIN — TOLTERODINE TARTRATE 1 MG: 1 TABLET, FILM COATED ORAL at 17:47

## 2017-05-24 NOTE — PROGRESS NOTES
Problem: Mobility Impaired (Adult and Pediatric)  Goal: *Acute Goals and Plan of Care (Insert Text)  Physical Therapy Goals  STGs  Initiated 5/11/2017, updated 5/25/2017, and to be accomplished by d/c [5/31/17]  1. Patient will roll side to side in bed with independence. (S)  2. Patient will perform supine to sit and sit to supine with independence. (S)  3. Patient will transfer from bed to chair and chair to bed with supervision using the least restrictive device. (MET with RW)  4. Patient will perform sit to stand with supervision. (MET)  5. Patient will propel w/c on level surface for 150 ft with R UE and B LEs with supervision. (MET)    LTGs  Initiated 5/11/2017, updated 5/18/2017, and to be accomplished within 2 - 4 weeks [6/8/17]  1. Patient will roll side to side in bed with independence. 2. Patient will perform supine to sit and sit to supine in bed with independence. 3. Patient will transfer from bed to chair and chair to bed with distant supervision using the least restrictive device. 4. Patient will perform sit to stand with distant supervision. 5. Patient will ambulate with distant supervision for 150 feet with the least restrictive device. 5. Patient will ascend/descend 3 stairs with bilateral handrail(s) with supervision. PT WEEKLY PROGRESS NOTE  Patient Name:Stephen Sinha   Time In: 1697   Time Out: 1200   Time in: 1334  Time out: 1430     Subjective: Pt reports having increased muscle soreness and pain in B feet today. Objective: Intervention 5/24/17: Weekly assessment performed requiring patient to demonstrate all aspects of functional mobility at highest level of function. See below for current status. Pt participated in gait training without AD in PM for short distances 5ft and 16ft and SBA with pt managing o2 tubing. Pt performed supine exercises for BLE strengthening and increasing activity tolerance.          Outcome Measures: FIM   Pain at start of tx: not rated Pain at stop of tx: not rated     Patient identified with name and : yes                     AROM: generally decreased, functional      FIM SCORES Initial Assessment Weekly Progress Assessment 2017   Bed/Chair/Wheelchair Transfers 2 5   Wheelchair Mobility 1 6   Walking [place] 1 5   Steps/Stairs 0 2   Please see [facility] Interdisciplinary Eval: Coordination/Balance Section for details regarding FIM score description. BED/CHAIR/WHEELCHAIR TRANSFERS Initial Assessment Weekly Progress Assessment 2017   Rolling Right 1 (Total assistance) 5 (Supervision)   Rolling Left 1 (Total assistance) 5 (Supervision)   Supine to Sit 1 (Total assistance) 5 (Supervision)   Sit to Stand Maximum assistance (in p-bars, pull to stand) Supervision   Sit to Supine 1 (Total assistance) 5 (Supervision)   Transfer Type Lateral with transfer board Other   Comments pt with difficulty wt shifting, pushing with UE's, and scooting  Pt requires increased time to perform sit to stand due to c/o arthritis pain. Pt able to perform stand step txfr with RW and dist S but to txfr without AD pt required SBA.    Car Transfer Not tested  NT   Car Type n/a  NA          GROSS ASSESSMENT Weekly Progress Assessment 2017   AROM Generally decreased, functional   Strength Generally decreased, functional   Coordination Within functional limits   Tone Normal   Sensation     PROM Generally decreased, functional       POSTURE Weekly Progress Assessment 2017   Posture (WDL) Exceptions to WDL   Posture Assessment Forward head;Rounded shoulders;Trunk flexion          WHEELCHAIR MOBILITY/MANAGEMENT Initial Assessment Weekly Progress Assessment 2017   Able to Propel 10 feet 220 feet   Curbs/ramps assistance required 0 (Not tested) 0 (Not tested)   Wheelchair set up assistance required 2 (Maximal assistance)  Aries   Wheelchair management Other (comment) (needs help to manage brakes at this time) Manages left brake;Manages right brake WALKING INDEPENDENCE Initial Assessment Weekly Progress Assessment 5/24/2017   Assistive device Other (comment) (attempted in p-bars, but unable to advance LE's) Walker, rolling   Ambulation assistance - level surface  1(dep/total)  5(dist S)   Distance 0 Feet (ft) 165 Feet (ft)   Comments attempted in p-bars, pt unable to wt shift and advance LE's, pain R knee/ankle limits Pt manages o2 tubing and required dist S only due to increased fatigue with increased distance. GAIT Weekly Progress Assessment 5/24/2017   Gait Description (WDL) Exceptions to WDL   Gait Abnormalities Decreased step clearance          STEPS/STAIRS Initial Assessment Weekly Progress Assessment 5/24/2017   Steps/Stairs ambulated 0 4 (6\" steps)   Rail Use Both Both   Comments n/a at this time due to inability to stand/walk Pt required SBA and v/c for proper LE sequencing for step to pattern. Curbs/Ramps    NT              Assessment: Pt has made significant progress and met 3/5 STGs. Pt continues to make progress toward PLOF but tends to be self limiting and requests A for hygiene and clothing management during toileting and when having increased stiffness requests A for sit to stand. Plan of Care: Please see Care Plan for updated LTGs.     Family Training:       Emigdio Vale, PTA  5/24/2017

## 2017-05-24 NOTE — REHAB NOTE
[facility]  Bedside and Verbal shift change report given to Pratima Urbano (oncoming nurse) by Geraldine Pearson, RN   (offgoing nurse). Report included the following information SBAR, Kardex, MAR and Recent Results. Situation:   Code Status: Full Code   Reason for Admission: [facility] Hospital Day: 14   Problem List:   Hospital Problems  Date Reviewed: 5/23/2017          Codes Class Noted POA    Hypomagnesemia ICD-10-CM: E83.42  ICD-9-CM: 275.2  5/22/2017 No    Overview Signed 5/22/2017  2:29 PM by Erwin Doyle MD     Mg (5/22/2017) = 1.              Acute renal failure superimposed on stage 3 chronic kidney disease (HCC) ICD-10-CM: N17.9, N18.3  ICD-9-CM: 584.9, 585.3  5/15/2017 No        Cellulitis of right forearm ICD-10-CM: L03.113  ICD-9-CM: 682.3  5/4/2017 Yes        Olecranon bursitis of right elbow ICD-10-CM: M70.21  ICD-9-CM: 726.33  5/4/2017 Yes        Contusion of left elbow ICD-10-CM: S50.02XA  ICD-9-CM: 923.11  5/4/2017 Yes        Impaired mobility and ADLs ICD-10-CM: Z74.09  ICD-9-CM: 799.89  5/4/2017 Yes        * (Principal)SIRS (systemic inflammatory response syndrome) ([facility] [address] ICD-10-CM: R65.10  ICD-9-CM: 995.90  5/2/2017 Yes        Right Achilles tendinitis ICD-10-CM: M76.61  ICD-9-CM: 726.71  5/2/2017 Yes        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 congestive heart failure (Chronic) ICD-10-CM: I13.10, N18.3  ICD-9-CM: 404.10, 585.3  Unknown Yes    Overview Signed 4/23/2013 10:02 AM by Feli Gasca     Better controlled             Type 2 diabetes with stage 3 chronic kidney disease GFR 30-59 (HCC) (Chronic) ICD-10-CM: E11.22, N18.3  ICD-9-CM: 250.40, 585.3  Unknown Yes              Background:   Past Medical History:   Past Medical History:   Diagnosis Date    Abnormally low high density lipoprotein (HDL) cholesterol with hypertriglyceridemia     Lipid profile (11/6/2016) showed , , HDL 38, LDL 37   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 congestive heart failure     Better controlled     Bipolar affective disorder ([address] 12/5/2012    Cellulitis of right forearm 5/4/2017    Chronic back pain     Chronic obstructive pulmonary disease (COPD) (HCC)     SOB, on paula O2 Recent admission with psychosis     CKD stage G3a/A1, GFR 45-59 and albumin creatinine ratio <30 mg/g 5/11/2017    Urine microalbumin/Creatinine ratio (5/11/2017) = 9 mg/g    Contusion of left elbow 5/4/2017    Depression     Diabetic neuropathy associated with type 2 diabetes mellitus (HCC)     Diastolic dysfunction without heart failure     Stable on diuretics     Falls frequently     Gastroesophageal reflux disease with hiatal hernia     Generalized osteoarthritis of multiple sites     History of acute renal failure 5/31/2013    History of back injury     jumped out of second story window     History of hepatitis C     treated    History of penicillin allergy     Hypothyroidism     Hypoxemia requiring supplemental oxygen 12/29/2014    Intravenous drug user 5/2/2017    Memory difficulty     Mixed connective tissue disease ([address] 5/10/2017    Obesity, Class I, BMI 30-34.9     Olecranon bursitis of right elbow 5/4/2017    Recurrent genital herpes 5/31/2013    Right Achilles tendinitis 5/2/2017    Sarcoidosis ([address]     Sickle cell trait ([address]     Type 2 diabetes with stage 3 chronic kidney disease GFR 30-59 ([address]     Venous insufficiency     Wears glasses       Patient taking anticoagulants yes    Patient has a defibrillator: no     Assessment:   Changes in Assessment throughout shift: no     Patient has central line: no Reasons if yes: Insertion date: Last dressing date:   Patient has Renae Cath: no Reasons if yes:     Insertion date:     Last Vitals:     Vitals:    05/22/17 2000 05/23/17 0808 05/23/17 1704 05/23/17 1930   BP: 98/62 122/75 117/76 119/76   Pulse: 91 86 93 82   Resp: 18 19 18 17   Temp: 99.4 °F (37.4 °C) 97.1 °F (36.2 °C) 97.5 °F (36.4 °C) 98.9 °F (37.2 °C)   SpO2:  93% 91% 96%   Weight:       Height:       LMP: 11/25/2012        PAIN    Pain Assessment    Pain Intensity 1: 8 (05/24/17 0736) Pain Intensity 1: 2 (12/29/14 1105)    Pain Location 1: Elbow Pain Location 1: Abdomen    Pain Intervention(s) 1: Medication (see MAR) Pain Intervention(s) 1: Medication (see MAR)  Patient Stated Pain Goal: 0 Patient Stated Pain Goal: 0  o Intervention effective: yes   o Other actions taken for pain: repositioning     Skin Assessment  Skin color Skin Color: Appropriate for ethnicity  Condition/Temperature Skin Condition/Temp: Dry, Warm  Integrity Skin Integrity: Intact  Turgor Turgor: Non-tenting  Weekly Pressure Ulcer Documentation  Pressure  Injury Documentation: No Pressure Injury Noted-Pressure Ulcer Prevention Initiated  Wound Prevention & Protection Methods  Orientation of wound Orientation of Wound Prevention: Posterior  Location of Prevention Location of Wound Prevention: Sacrum/Coccyx  Dressing Present Dressing Present : No  Dressing Status    Wound Offloading Wound Offloading (Prevention Methods): Bed, pressure redistribution/air     INTAKE/OUPUT    Date 05/23/17 0700 - 05/24/17 0659 05/24/17 0700 - 05/25/17 0659   Shift 7752-8086 4485-0489 24 Hour Total 8750-2112 8204-8712 24 Hour Total   I  N  T  A  K  E   P.O. 460  460         P. O. 460  460       Shift Total  (mL/kg) 460  (4.8)  460  (4.8)      O  U  T  P  U  T   Urine  (mL/kg/hr)            Urine Occurrence(s) 2 x 7 x 9 x       Stool            Stool Occurrence(s) 0 x 0 x 0 x       Shift Total  (mL/kg)           460      Weight (kg) 95.3 95.3 95.3 95.3 95.3 95.3       Recommendations:  1. Patient needs and requests: none    2. Diet: diabetic    3. Pending tests/procedures: am labs     4. Functional Level/Equipment: assist x 2/wheelchair    5.  Estimated Discharge Date: tbd Posted on Whiteboard in Patients Room: [facility] Safety Check    A safety check occurred in the patient's room between off going nurse and oncoming nurse listed above. The safety check included the below items  Area Items   H  High Alert Medications - Verify all high alert medication drips (heparin, PCA, etc.)   E  Equipment - Suction is set up for ALL patients (with lashay)  - Red plugs utilized for all equipment (IV pumps, etc.)  - WOWs wiped down at end of shift.  - Room stocked with oxygen, suction, and other unit-specific supplies   A  Alarms - Bed alarm is set for fall risk patients  - Ensure chair alarm is in place and activated if patient is up in a chair   L  Lines - Check IV for any infiltration  - Renae bag is empty if patient has a Renae   - Tubing and IV bags are labeled   S  Safety   - Room is clean, patient is clean, and equipment is clean. - Hallways are clear from equipment besides carts. - Fall bracelet on for fall risk patients  - Ensure room is clear and free of clutter  - Suction is set up for ALL patients (with lashay)  - Hallways are clear from equipment besides carts.    - Isolation precautions followed, supplies available outside room, sign posted   Misbah Sutherland RN

## 2017-05-24 NOTE — PROGRESS NOTES
[facility] PHYSICAL REHABILITATION  [address] [place] [address], [address]     INPATIENT REHABILITATION  DAILY PROGRESS NOTE     Date: 5/24/2017    Name: Fatuma Patel Age / Sex: 62 y.o. / female   CSN: [id number] MRN: [id number]   [facility] Date: 5/10/2017 Length of Stay: 14 days     Primary Rehab Diagnosis: Impaired Mobility and ADLs secondary to Systemic inflammatory response syndrome (SIRS) secondary to:  1. Cellulitis of the right forearm  2. Olecranon bursitis of the right elbow  3. Contusion of left elbow  4. Right Achilles tendonitis      Subjective:     Patient seen and examined. Blood pressure controlled. Blood sugars better controlled. Patient's Complaint:   No significant medical complaints     Pain Control: ongoing significant pain in which is stable and controlled by current meds      Objective:     Vital Signs:  Patient Vitals for the past 24 hrs:   BP Temp Pulse Resp SpO2   05/24/17 0818 140/80 99.5 °F (37.5 °C) 80 18 91 %   05/23/17 1930 119/76 98.9 °F (37.2 °C) 82 17 96 %   05/23/17 1704 117/76 97.5 °F (36.4 °C) 93 18 91 %        Physical Examination:  GENERAL SURVEY: Patient is awake, alert, oriented x 3, sitting comfortably on the chair, not in acute respiratory distress. HEENT: pink palpebral conjunctivae, anicteric sclerae, no nasoaural discharge, moist oral mucosa  NECK: supple, no jugular venous distention, no palpable lymph nodes  CHEST/LUNGS: symmetrical chest expansion, good air entry, clear breath sounds  HEART: adynamic precordium, good S1 S2, no S3, regular rhythm, no murmurs  ABDOMEN: obese, bowel sounds appreciated, soft, non-tender  EXTREMITIES: (+) left hand wrapped in gauze, pink nailbeds, (+) decreased warmth/erythema/swelling of both elbows, (+) bipedal edema, full and equal pulses, no calf tenderness   NEUROLOGICAL EXAM: The patient is awake, alert and oriented x3, able to answer questions fairly appropriately, able to follow 1 and 2 step commands.  Able to tell time from the wall clock. Cranial nerves II-XII are grossly intact. No gross sensory deficit. Motor strength is 2+/5 on both shoulders, 3/5 on the right elbow, 3-/5 on the left elbow, 2+/5 on both wrists, 3+/5 on the right handgrip, 3/5 on the left handgrip, 3-/5 on the right hip, 3/5 on the left hip, 3-/5 on both knees and both ankles.       Current Medications:  Current Facility-Administered Medications   Medication Dose Route Frequency    insulin glargine (LANTUS) injection 25 Units  25 Units SubCUTAneous QHS    insulin glargine (LANTUS) injection 50 Units  50 Units SubCUTAneous ACB    magnesium oxide (MAG-OX) tablet 800 mg  800 mg Oral BID    oxyCODONE-acetaminophen (PERCOCET) 5-325 mg per tablet 1 Tab  1 Tab Oral TID    oxyCODONE-acetaminophen (PERCOCET) 5-325 mg per tablet 1 Tab  1 Tab Oral Q4H PRN    tolterodine (DETROL) tablet 1 mg  1 mg Oral BID    famotidine (PEPCID) tablet 20 mg  20 mg Oral DAILY    gabapentin (NEURONTIN) capsule 300 mg  300 mg Oral Q12H    diphenhydrAMINE (BENADRYL) capsule 25 mg  25 mg Oral QHS    cholecalciferol (VITAMIN D3) tablet 1,000 Units  1,000 Units Oral DAILY    acetaminophen (TYLENOL) tablet 650 mg  650 mg Oral Q4H PRN    docusate sodium (COLACE) capsule 100 mg  100 mg Oral BID    bisacodyl (DULCOLAX) tablet 10 mg  10 mg Oral Q48H PRN    heparin (porcine) injection 5,000 Units  5,000 Units SubCUTAneous Q8H    insulin lispro (HUMALOG) injection   SubCUTAneous TIDAC    albuterol (PROVENTIL VENTOLIN) nebulizer solution 2.5 mg  2.5 mg Nebulization Q4H PRN    divalproex DR (DEPAKOTE) tablet 250 mg  250 mg Oral QHS    ARIPiprazole (ABILIFY) tablet 10 mg  10 mg Oral DAILY    traZODone (DESYREL) tablet 100 mg  100 mg Oral QHS    rosuvastatin (CRESTOR) tablet 5 mg  5 mg Oral QHS    aspirin chewable tablet 81 mg  81 mg Oral DAILY WITH BREAKFAST    levothyroxine (SYNTHROID) tablet 100 mcg  100 mcg Oral ACB    sertraline (ZOLOFT) tablet 50 mg  50 mg Oral DAILY    umeclidinium-vilanterol (ANORO ELLIPTA) 62.5 mcg- 25 mcg/inhalation  1 Puff Inhalation QAM RT    B complex-vitaminC-folic acid (NEPHROCAP) cap  1 Cap Oral DAILY       Allergies:   Allergies   Allergen Reactions    Moxifloxacin Rash    Pcn [Penicillins] Swelling    Sulfa (Sulfonamide Antibiotics) Swelling       Functional Progress:    PHYSICAL THERAPY    ON ADMISSION MOST RECENT   Wheelchair Mobility/Management  Able to Propel (ft): 10 feet  Functional Level: 1  Curbs/Ramps Assist Required (FIM Score): 0 (Not tested)  Wheelchair Setup Assist Required : 2 (Maximal assistance)  Wheelchair Management: Other (comment) (needs help to manage brakes at this time) Wheelchair Mobility/Management  Able to Propel (ft): 170 feet  Functional Level: 5  Curbs/Ramps Assist Required (FIM Score): 1 (Total assistance)  Wheelchair Setup Assist Required : 5 (Supervision/setup) (not using leg rests)  Wheelchair Management: Manages left brake, Manages right brake     Gait  Amount of Assistance: 1 (Dependent/total assistance)  Distance (ft): 0 Feet (ft)  Assistive Device: Other (comment) (attempted in p-bars, but unable to advance LE's) Gait  Amount of Assistance: 5 (Stand-by assistance)  Distance (ft): 165 Feet (ft)  Assistive Device: Walker, rolling     Balance-Sitting/Standing  Sitting - Static: Good (unsupported)  Sitting - Dynamic: Fair (occasional)  Standing - Static: Poor  Standing - Dynamic : Impaired Balance-Sitting/Standing  Sitting - Static: Good (unsupported)  Sitting - Dynamic: Fair (occasional)  Standing - Static: Fair  Standing - Dynamic : Impaired     Bed/Mat Mobility  Rolling Right : 1 (Total assistance)  Rolling Left : 1 (Total assistance)  Supine to Sit : 1 (Total assistance)  Sit to Supine : 1 (Total assistance) Bed/Mat Mobility  Rolling Right : 6 (Modified independent)  Rolling Left : 6 (Modified independent)  Supine to Sit : 5 (Supervision)  Sit to Supine : 5 (Supervision)     Transfers  Transfer Type: Lateral with transfer board  Other: stand step txfr without AD/anterior approach  Transfer Assistance : 2 (Maximal assistance)  Sit to Stand Assistance: Maximum assistance (in p-bars, pull to stand)  Car Transfers: Not tested  Car Type: n/a Transfers  Transfer Type: Other  Other: stand step with RW  Transfer Assistance : 5 (Supervision/setup)  Sit to Stand Assistance: Supervision  Car Transfers: Not tested  Car Type: n/a     Steps or Stairs  Steps/Stairs Ambulated (#): 0  Level of Assist : 0 (Not tested)  Rail Use: Both Steps or Stairs  Steps/Stairs Ambulated (#): 2  Level of Assist : 4 (Minimal assistance)  Rail Use: Both         Lab/Data Review:  Recent Results (from the past 24 hour(s))   GLUCOSE, POC    Collection Time: 05/23/17 12:46 PM   Result Value Ref Range    Glucose (POC) 193 (H) 70 - 110 mg/dL   GLUCOSE, POC    Collection Time: 05/23/17  5:04 PM   Result Value Ref Range    Glucose (POC) 232 (H) 70 - 110 mg/dL   GLUCOSE, POC    Collection Time: 05/23/17  8:53 PM   Result Value Ref Range    Glucose (POC) 182 (H) 70 - 110 mg/dL   GLUCOSE, POC    Collection Time: 05/24/17  8:18 AM   Result Value Ref Range    Glucose (POC) 136 (H) 70 - 110 mg/dL       Estimated Glomerular Filtration Rate:  CKD-EPI:   On admission, estimated GFR was 53.6 mL/min/1.73m2 based on a Creatinine of 1.26 mg/dl. Most recent estimated GFR was 39.6 mL/min/1.73m2 based on a Creatinine of 1.62 mg/dl. MDRD:   On admission, estimated GFR was 55.7 mL/min/1.73m2 based on a Creatinine of 1.26 mg/dl. Most recent estimated GFR was 41.7 mL/min/1.73m2 based on a Creatinine of 1.62 mg/dl. Assessment:     Primary Rehabilitation Diagnosis  1. Impaired Mobility and ADLs  2. Systemic inflammatory response syndrome (SIRS) secondary to:   a. Cellulitis of the right forearm   b. Olecranon bursitis of the right elbow   c. Contusion of left elbow   d.  Right Achilles tendonitis     Comorbidities   Type 2 diabetes with stage 3 chronic kidney disease GFR 30-59  Diabetic neuropathy associated with type 2 diabetes mellitus     Venous insufficiency    Obesity, Class I, BMI 30-34.9    Chronic back pain    Generalized osteoarthritis of multiple sites    Bipolar affective disorder     Abnormally low high density lipoprotein (HDL) cholesterol with hypertriglyceridemia    Diastolic dysfunction without heart failure    Chronic obstructive pulmonary disease (COPD)     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 congestive heart failure    Depression    History of back injury    Anxiety    Wears glasses    History of hepatitis C    Sickle cell trait (HCC)    History of acute renal failure    Hypothyroidism    Recurrent genital herpes    Sarcoidosis ([facility] [address]    History pf abscess of right arm    Hypoxemia requiring supplemental oxygen    Abnormal nuclear stress test    History of cellulitis and abscess of hand    History of cellulitis and abscess of foot    Intravenous drug user    History of penicillin allergy    Falls frequently    Gastroesophageal reflux disease with hiatal hernia    Memory difficulty    Mixed connective tissue disease     CKD stage G3a/A1, GFR 45-59 and albumin creatinine ratio <30 mg/g     Acute renal failure superimposed on stage 3 chronic kidney disease    Hypomagnesemia       Plan:     1. Justification for continued stay: Good progression towards established rehabilitation goals. 2. Medical Issues being followed closely:    [x]  Fall and safety precautions     [x]  Wound Care     [x]  Bowel and Bladder Function     [x]  Fluid Electrolyte and Nutrition Balance     [x]  Pain Control      3.  Issues that 24 hour rehabilitation nursing is following:    [x]  Fall and safety precautions     [x]  Wound Care     [x]  Bowel and Bladder Function     [x]  Fluid Electrolyte and Nutrition Balance     [x]  Pain Control      [x]  Assistance with and education on in-room safety with transfers to and from the bed, wheelchair, toilet and shower. 4. Acute rehabilitation plan of care:    [x]  Continue current care and rehab. [x]  Physical Therapy           [x]  Occupational Therapy           []  Speech Therapy     []  Hold Rehab until further notice     5. Medications:    [x]  MAR Reviewed     [x]  Continue Present Medications     6. DVT Prophylaxis:      []  Lovenox     [x]  Unfractionated Heparin     []  Coumadin     []  NOAC     [x]  ROBERT Stockings     []  Sequential Compression Device     []  None     7.  Orders:   > Systemic inflammatory response syndrome (SIRS) secondary to Cellulitis of the right forearm, Olecranon bursitis of the right elbow, Contusion of left elbow and Right Achilles tendonitis    > Patient had completed the recommended treatment course of oral Clindamycin on 5/13/2017     > Abnormally low HDL with hypertriglyceridemia   > Continue Rosuvastatin 5 mg PO q HS     > 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 congestive heart failure    > On 5/11/2017, discontinued Furosemide 20 mg PO once daily (re: rising Creatinine)     > Bipolar affective disease   > Continue:    > Aripiprazole 10 mg PO once daily    > Divalproex  mg PO q HS     > Chronic obstructive pulmonary disease   > Continue:    > Anoro Ellipta 1 puff inhaled once daily    > Albuterol nebulization q 4 hr PRN for shortness of breath     > Depression / Anxiety   > Continue:    > Aripiprazole 10 mg PO once daily    > Sertraline 50 mg PO once daily    > Trazodone 100 mg PO q HS     > Diabetic neuropathy   > On 5/15/2017, decreased Gabapentin from 300 mg PO q 8 hr to 300 mg PO q 12 hr (re: decrease in CrCl)   > Continue Gabapentin 300 mg PO q 12 hr     > Diastolic dysfunction without heart failure   > On 5/11/2017, discontinued Furosemide 20 mg PO once daily (re: rising Creatinine)   > On 5/14/2017, resumed Eplerenone 25 mg PO once daily    > On 5/15/2017, discontinued Eplerenone 25 mg PO once daily (re: decrease in CrCl)     > Gastroesophageal reflux disease with hiatal hernia   > On 5/15/2017, decreased Famotidine from 20 mg PO BID to 20 mg PO once daily   > Continue Famotidine 20 mg PO once daily     > Hypothyroidism   > Continue Levothyroxine 100 mcg PO once daily before breakfast     > Hypoxemia requiring supplemental oxygen   > Continue O2 inhalation 2 LPM via nasal cannula continuous     > Type 2 diabetes mellitus with diabetic neuropathy and stage 3 chronic kidney disease    > Prior to admission to [facility], the patient claimed she wa snot using Lantus at home; instead, she was using Insulin U500 on a sliding scale basis   > On 5/18/2017, changed Lantus from 65 units SC q HS to 40 units SC q AM and 20 units SC q PM   > On 5/19/2017:    > Increased Lantus from 40 units to 44 units SC q AM    > Increased Lantus from 20 units to 22 units SC q PM   > on 5/22/2017:    > Increased Lantus from 44 units to 50 units SC q AM    > Increased Lantus from 22 units to 25 units SC q PM   > Continue:    > Increase Lantus from 50 units to 55 units SC q AM    > Lantus 25 units SC q PM    > Humalog insulin sliding scale SC TID AC only     > Hypomagnesemia   > Mg (5/11/2017, on admission) = 1.6   > Patient was started on Mg(OH)2 400 mg PO BID   > Mg (5/22/2017) = 1.5   > On 5/22/2017, increased Mg(OH)2 from 400 mg to 800 mg PO BID   > Continue Mg(OH)2 800 mg PO BID    > CKD stage G3a/A1, GFR 45-59 and albumin creatinine ratio <30 mg/g     > On admission, based on a Creatinine of 1.26 mg/dl:    Estimated GFR using CKD-EPI = 53.6 mL/min/1.73m2    Estimated GFR using MDRD = 55.7 mL/min/1.73m2    > Urine microalbumin/Creatinine ratio (5/11/2017) = 9 mg/g    > Acute renal failure superimposed on stage 3 chronic kidney disease   > BUN/Creatinine (5/10/2017) = 19/1.33    > BUN/Creatinine (5/11/2017, on admission) = 16/1.26    > On 5/12/2017:    > Discontinued Diclofenac 2 grams applied to affected areas 4 times daily     > Started Naproxen 375 mg PO BID with meals   > On 5/14/2017, resumed Eplerenone 25 mg PO once daily    > BUN/Creatinine (5/15/2017) = 22/1.87    > Urinalysis (5/15/2017) showed SG 1.014, no casts   > On 5/15/2017:    > Discontinued Naproxen 375 mg PO BID with meals    > Discontinued Eplerenone 25 mg PO once daily     > Decreased Gabapentin from 300 mg PO q 8 hr to 300 mg PO q 12 hr     > Decreased Famotidine from 20 mg PO BID to 20 mg PO once daily    > Patient was given IVF: 0.9%  ml/hr x 1 liter   > On 5/16/2017, patient was given IVF: 0.9%  ml/hr x 1 liter   > On 5/17/2017, patient was given IVF: 0.9%  ml/hr x 1 liter   > On 5/18/2017, patient was given IVF: 0.9%  ml/hr x 1 liter   > BUN/Creatinine (5/20/2017) = 16/1.58    > BUN/Creatinine (5/21/2017) = 17/1.58   > BUN/Creatinine (5/22/2017) = 19/1.62   > On 5/22/2017, patient was given IVF: 0.9%  ml/hr x 1 liter   > On 5/23/2017, patient was given IVF: 0.9%  ml/hr x 1 liter   > Increase oral fluid intake    > Difficulty sleeping    > Continue:    > Trazodone 100 mg PO q HS    > Diphenhydramine HCl 25 mg PO q HS    > Urinary urge incontinence   > On 5/15/2017, added Diphenhydramine HCl 25 mg PO q HS (re: ADR is urinary retention)   > On 5/17/2017, added Tolterodine 1 mg PO BID   > Continue:    > Tolterodine 1 mg PO BID    > Diphenhydramine HCl 25 mg PO q HS    > Analgesia   > On 5/12/2017:    > Discontinued Diclofenac 2 grams applied to affected areas 4 times daily     > Started Naproxen 375 mg PO BID with meals   > On 5/15/2017, discontinued Naproxen 375 mg PO BID with meals (re: decrease in CrCl)   > On 5/17/2017:    > Discontinued Norco 5/325 1 tab PO q 4 hr PRN for pain level greater than 4/10    > Added:     > Percocet 5/325 1 tab PO TID (8AM, 12PM, 4PM)     > Percocet 5/325 1 tab PO q 4 hr PRN for pain level greater than 4/10 (from 8PM to 4AM only)   > Continue:    > Acetaminophen 650 mg PO q 4 hr PRN for pain level less than 5/10    > Percocet 5/325 1 tab PO TID (8AM, 12PM, 4PM)    > Percocet 5/325 1 tab PO q 4 hr PRN for pain level greater than 4/10 (from 8PM to 4AM only)        8. Patient's progress in rehabilitation and medical issues discussed with the patient. All questions answered to the best of my ability. Care plan discussed with patient and nurse.       Mu Banerjee MD    May 24, 2017

## 2017-05-24 NOTE — PROGRESS NOTES
Problem: Self Care Deficits Care Plan (Adult)  Goal: *Acute Goals and Plan of Care (Insert Text)  Long Term Goals (to be met upon discharge date) in order to increase pts functional independence and safety, and decrease burden of care:  1. Pt will perform self-feeding with setup. 2. Pt will perform grooming with setup  3. Pt will perform UB bathing with Min A.  4. Pt will perform LB bathing with Min A.  5. Pt will perform tub/shower transfer with Min A.   6. Pt will perform UB dressing with Min A.  7. Pt will perform LB dressing with Min A.  8. Pt will perform toileting task with Min A.  9. Pt will perform toilet transfer with Min A. Short Term Weekly Goals for (2017 to 2017) in order to increase pts functional independence and safety, and decrease burden of care:  1. Pt will perform self-feeding with S.   2. Pt will perform grooming with S.  3. Pt will perform UB bathing with S.  4. Pt will perform LB bathing with S.  5. Pt will perform tub/shower transfer with S.  6. Pt will perform UB dressing with S.  7. Pt will perform LB dressing with min A.  8. Pt will perform toileting task with S.  9. Pt will perform toilet transfer with S.   OCCUPATIONAL THERAPY DAILY NOTE  Patient Name:Stephen Sinha  Time Spent With Patient  Time In: 1200  Time Out: 1230      Time In: 1510  Time Out: 1610     Medical Diagnosis:  Right forearm cellulits  SIRS (systemic inflammatory response syndrome) (HCC) SIRS (systemic inflammatory response syndrome) (HCC)      Pain at start of tx: Not rated  Pain at stop of tx: Not rated     Patient identified with name and : yes  Subjective: Pt reports increased discomfort in lower back with prolonged standing activity today. Objective:      THERAPEUTIC ACTIVITY Daily Assessment     Pt worked on static standing tolerance while playing tabletop card game and overall endurance to carryover to ADLs and functional transfers/mobility.  She stood for 1 trial of 9:40 minutes and a 2nd trial of ~6 minutes before reported need for toileting. Pt required supervision for safety with standing only. THERAPEUTIC EXERCISE Daily Assessment     Pt performed small bead retrieval using red theraputty for B hand strengthening. Pt able to recall HEP using red theraputty with minimal cues and was encouraged to complete tonight. TOILETING Daily Assessment     Toileting  Toileting Assistance (FIM Score): 5 (Supervision)   Pt needed supervision for safety while standing to complete hygiene and clothing management. LOWER BODY DRESSING Daily Assessment     Lower Body Dressing   Dressing Assistance : 2 (Maximal assistance) (doffing/donning socks only)  Leg Crossed Method Used: No  Position Performed: Seated in chair  Adaptive Equipment Used: Reacher;Sock aid  Comments: Re-educated pt on using reacher and sock aid to doff and shante socks. Pt able to doff R sock using reacher with max encouragement and increased time/effort. Pt needed Max A for use of sock aid due to decreased hand strength. MOBILITY/TRANSFERS Daily Assessment     Functional Transfers  Toilet Transfer :  (Stand step transfer without AD)  Amount of Assistance Required: 5 (Supervision/setup)   Pt ambulated to bathroom without an AD and completed stand step transfer to/from [facility] over toilet with supervision and verbal cues for oxygen line management. Assessment: Pt continues to demonstrate self-limiting behavior throughout treatment and demonstrates decreased standing activity tolerance as well decreased independence with use of AE for LB dressing. However, pt does demonstrate ability to perform toileting task at a supervision level without physical assistance required from OT today. Plan of Care: Continue POC to maximize pt independence and safety performing ADLs and functional transfers/mobility.      Valentino Milks, OTR  5/24/2017

## 2017-05-25 LAB
ANION GAP BLD CALC-SCNC: 8 MMOL/L (ref 3–18)
BUN SERPL-MCNC: 20 MG/DL (ref 7–18)
BUN/CREAT SERPL: 13 (ref 12–20)
CALCIUM SERPL-MCNC: 10.2 MG/DL (ref 8.5–10.1)
CHLORIDE SERPL-SCNC: 99 MMOL/L (ref 100–108)
CO2 SERPL-SCNC: 33 MMOL/L (ref 21–32)
CREAT SERPL-MCNC: 1.51 MG/DL (ref 0.6–1.3)
GLUCOSE BLD STRIP.AUTO-MCNC: 139 MG/DL (ref 70–110)
GLUCOSE BLD STRIP.AUTO-MCNC: 172 MG/DL (ref 70–110)
GLUCOSE BLD STRIP.AUTO-MCNC: 204 MG/DL (ref 70–110)
GLUCOSE BLD STRIP.AUTO-MCNC: 225 MG/DL (ref 70–110)
GLUCOSE SERPL-MCNC: 106 MG/DL (ref 74–99)
HCT VFR BLD AUTO: 31.5 % (ref 35–45)
HGB BLD-MCNC: 10 G/DL (ref 12–16)
MAGNESIUM SERPL-MCNC: 1.7 MG/DL (ref 1.6–2.6)
PLATELET # BLD AUTO: 143 K/UL (ref 135–420)
POTASSIUM SERPL-SCNC: 4.2 MMOL/L (ref 3.5–5.5)
SODIUM SERPL-SCNC: 140 MMOL/L (ref 136–145)
URATE SERPL-MCNC: 7.9 MG/DL (ref 2.6–7.2)

## 2017-05-25 PROCEDURE — 74011250637 HC RX REV CODE- 250/637: Performed by: INTERNAL MEDICINE

## 2017-05-25 PROCEDURE — 86200 CCP ANTIBODY: CPT | Performed by: SPECIALIST

## 2017-05-25 PROCEDURE — 83735 ASSAY OF MAGNESIUM: CPT | Performed by: INTERNAL MEDICINE

## 2017-05-25 PROCEDURE — 84550 ASSAY OF BLOOD/URIC ACID: CPT | Performed by: SPECIALIST

## 2017-05-25 PROCEDURE — 97530 THERAPEUTIC ACTIVITIES: CPT

## 2017-05-25 PROCEDURE — 97535 SELF CARE MNGMENT TRAINING: CPT

## 2017-05-25 PROCEDURE — 80048 BASIC METABOLIC PNL TOTAL CA: CPT | Performed by: INTERNAL MEDICINE

## 2017-05-25 PROCEDURE — 74011250636 HC RX REV CODE- 250/636: Performed by: SPECIALIST

## 2017-05-25 PROCEDURE — 74011636637 HC RX REV CODE- 636/637: Performed by: INTERNAL MEDICINE

## 2017-05-25 PROCEDURE — 97116 GAIT TRAINING THERAPY: CPT

## 2017-05-25 PROCEDURE — 74011250636 HC RX REV CODE- 250/636: Performed by: INTERNAL MEDICINE

## 2017-05-25 PROCEDURE — 36415 COLL VENOUS BLD VENIPUNCTURE: CPT | Performed by: INTERNAL MEDICINE

## 2017-05-25 PROCEDURE — 85049 AUTOMATED PLATELET COUNT: CPT | Performed by: INTERNAL MEDICINE

## 2017-05-25 PROCEDURE — 65310000000 HC RM PRIVATE REHAB

## 2017-05-25 PROCEDURE — 82962 GLUCOSE BLOOD TEST: CPT

## 2017-05-25 PROCEDURE — 97110 THERAPEUTIC EXERCISES: CPT

## 2017-05-25 PROCEDURE — 85018 HEMOGLOBIN: CPT | Performed by: INTERNAL MEDICINE

## 2017-05-25 RX ORDER — FUROSEMIDE 10 MG/ML
40 INJECTION INTRAMUSCULAR; INTRAVENOUS EVERY 12 HOURS
Status: COMPLETED | OUTPATIENT
Start: 2017-05-25 | End: 2017-05-26

## 2017-05-25 RX ORDER — AMOXICILLIN 250 MG
2 CAPSULE ORAL
Status: DISCONTINUED | OUTPATIENT
Start: 2017-05-26 | End: 2017-05-27

## 2017-05-25 RX ORDER — KETOROLAC TROMETHAMINE 15 MG/ML
15 INJECTION, SOLUTION INTRAMUSCULAR; INTRAVENOUS EVERY 6 HOURS
Status: DISCONTINUED | OUTPATIENT
Start: 2017-05-25 | End: 2017-05-26

## 2017-05-25 RX ORDER — INSULIN GLARGINE 100 [IU]/ML
60 INJECTION, SOLUTION SUBCUTANEOUS
Status: DISCONTINUED | OUTPATIENT
Start: 2017-05-26 | End: 2017-05-26

## 2017-05-25 RX ORDER — DOCUSATE SODIUM 100 MG/1
100 CAPSULE, LIQUID FILLED ORAL
Status: DISCONTINUED | OUTPATIENT
Start: 2017-05-26 | End: 2017-05-27

## 2017-05-25 RX ADMIN — TRAZODONE HYDROCHLORIDE 100 MG: 50 TABLET ORAL at 20:47

## 2017-05-25 RX ADMIN — LEVOTHYROXINE SODIUM 100 MCG: 100 TABLET ORAL at 06:40

## 2017-05-25 RX ADMIN — OXYCODONE HYDROCHLORIDE AND ACETAMINOPHEN 1 TABLET: 5; 325 TABLET ORAL at 04:26

## 2017-05-25 RX ADMIN — TOLTERODINE TARTRATE 1 MG: 1 TABLET, FILM COATED ORAL at 18:05

## 2017-05-25 RX ADMIN — OXYCODONE HYDROCHLORIDE AND ACETAMINOPHEN 1 TABLET: 5; 325 TABLET ORAL at 13:08

## 2017-05-25 RX ADMIN — DIPHENHYDRAMINE HYDROCHLORIDE 25 MG: 25 CAPSULE ORAL at 20:47

## 2017-05-25 RX ADMIN — HEPARIN SODIUM 5000 UNITS: 5000 INJECTION, SOLUTION INTRAVENOUS; SUBCUTANEOUS at 21:12

## 2017-05-25 RX ADMIN — ROSUVASTATIN CALCIUM 5 MG: 5 TABLET ORAL at 20:48

## 2017-05-25 RX ADMIN — FUROSEMIDE 40 MG: 10 INJECTION, SOLUTION INTRAVENOUS at 20:49

## 2017-05-25 RX ADMIN — ASPIRIN 81 MG CHEWABLE TABLET 81 MG: 81 TABLET CHEWABLE at 09:21

## 2017-05-25 RX ADMIN — HEPARIN SODIUM 5000 UNITS: 5000 INJECTION, SOLUTION INTRAVENOUS; SUBCUTANEOUS at 06:19

## 2017-05-25 RX ADMIN — GABAPENTIN 300 MG: 300 CAPSULE ORAL at 18:02

## 2017-05-25 RX ADMIN — TOLTERODINE TARTRATE 1 MG: 1 TABLET, FILM COATED ORAL at 09:20

## 2017-05-25 RX ADMIN — OXYCODONE HYDROCHLORIDE AND ACETAMINOPHEN 1 TABLET: 5; 325 TABLET ORAL at 21:10

## 2017-05-25 RX ADMIN — Medication 800 MG: at 18:02

## 2017-05-25 RX ADMIN — OXYCODONE HYDROCHLORIDE AND ACETAMINOPHEN 1 TABLET: 5; 325 TABLET ORAL at 17:04

## 2017-05-25 RX ADMIN — DIVALPROEX SODIUM 250 MG: 250 TABLET, DELAYED RELEASE ORAL at 20:47

## 2017-05-25 RX ADMIN — INSULIN LISPRO 4 UNITS: 100 INJECTION, SOLUTION INTRAVENOUS; SUBCUTANEOUS at 18:03

## 2017-05-25 RX ADMIN — KETOROLAC TROMETHAMINE 15 MG: 15 INJECTION, SOLUTION INTRAMUSCULAR; INTRAVENOUS at 23:09

## 2017-05-25 RX ADMIN — INSULIN GLARGINE 25 UNITS: 100 INJECTION, SOLUTION SUBCUTANEOUS at 20:47

## 2017-05-25 RX ADMIN — NEPHROCAP 1 CAPSULE: 1 CAP ORAL at 09:20

## 2017-05-25 RX ADMIN — SERTRALINE HYDROCHLORIDE 50 MG: 50 TABLET ORAL at 09:20

## 2017-05-25 RX ADMIN — DOCUSATE SODIUM 100 MG: 100 CAPSULE, LIQUID FILLED ORAL at 09:20

## 2017-05-25 RX ADMIN — Medication 800 MG: at 09:20

## 2017-05-25 RX ADMIN — INSULIN GLARGINE 55 UNITS: 100 INJECTION, SOLUTION SUBCUTANEOUS at 09:22

## 2017-05-25 RX ADMIN — HEPARIN SODIUM 5000 UNITS: 5000 INJECTION, SOLUTION INTRAVENOUS; SUBCUTANEOUS at 13:08

## 2017-05-25 RX ADMIN — OXYCODONE HYDROCHLORIDE AND ACETAMINOPHEN 1 TABLET: 5; 325 TABLET ORAL at 09:21

## 2017-05-25 RX ADMIN — CHOLECALCIFEROL TAB 25 MCG (1000 UNIT) 1000 UNITS: 25 TAB at 09:21

## 2017-05-25 RX ADMIN — FAMOTIDINE 20 MG: 20 TABLET ORAL at 09:20

## 2017-05-25 RX ADMIN — DOCUSATE SODIUM 100 MG: 100 CAPSULE, LIQUID FILLED ORAL at 18:00

## 2017-05-25 RX ADMIN — INSULIN LISPRO 2 UNITS: 100 INJECTION, SOLUTION INTRAVENOUS; SUBCUTANEOUS at 13:09

## 2017-05-25 RX ADMIN — GABAPENTIN 300 MG: 300 CAPSULE ORAL at 06:19

## 2017-05-25 RX ADMIN — ARIPIPRAZOLE 10 MG: 10 TABLET ORAL at 09:20

## 2017-05-25 NOTE — REHAB NOTE
[facility]  Bedside and Verbal shift change report given to Cynthia Weeks RN (oncoming nurse) by Clarence Tejada LPN   (offgoing nurse). Report included the following information SBAR, Kardex, MAR and Recent Results. Situation:   Code Status: Full Code   Reason for Admission: [facility] Day: 15   Problem List:   Hospital Problems  Date Reviewed: 5/25/2017          Codes Class Noted POA    Hypomagnesemia ICD-10-CM: E83.42  ICD-9-CM: 275.2  5/22/2017 No    Overview Signed 5/22/2017  2:29 PM by Dilan Leong MD     Mg (5/22/2017) = 1.              Acute renal failure superimposed on stage 3 chronic kidney disease (HCC) ICD-10-CM: N17.9, N18.3  ICD-9-CM: 584.9, 585.3  5/15/2017 No        Cellulitis of right forearm ICD-10-CM: L03.113  ICD-9-CM: 682.3  5/4/2017 Yes        Olecranon bursitis of right elbow ICD-10-CM: M70.21  ICD-9-CM: 726.33  5/4/2017 Yes        Contusion of left elbow ICD-10-CM: S50.02XA  ICD-9-CM: 923.11  5/4/2017 Yes        Impaired mobility and ADLs ICD-10-CM: Z74.09  ICD-9-CM: 799.89  5/4/2017 Yes        * (Principal[facility]ic [facility] ICD-10-CM: R65.10  ICD-9-CM: 995.90  5/2/2017 Yes        Right Achilles tendinitis ICD-10-CM: M76.61  ICD-9-CM: 726.71  5/2/2017 Yes        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 congestive heart failure (Chronic) ICD-10-CM: I13.10, N18.3  ICD-9-CM: 404.10, 585.3  Unknown Yes    Overview Signed 4/23/2013 10:02 AM by Miracle Bridges     Better controlled             Type 2 diabetes with stage 3 chronic kidney disease GFR 30-59 (HCC) (Chronic) ICD-10-CM: E11.22, N18.3  ICD-9-CM: 250.40, 585.3  Unknown Yes              Background:   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 congestive heart failure     Better controlled     Bipolar affective disorder ([address] 12/5/2012    Cellulitis of right forearm 5/4/2017    Chronic back pain     Chronic obstructive pulmonary disease (COPD) (HCC)     SOB, on paula O2 Recent admission with psychosis     CKD stage G3a/A1, GFR 45-59 and albumin creatinine ratio <30 mg/g 5/11/2017    Urine microalbumin/Creatinine ratio (5/11/2017) = 9 mg/g    Contusion of left elbow 5/4/2017    Depression     Diabetic neuropathy associated with type 2 diabetes mellitus (HCC)     Diastolic dysfunction without heart failure     Stable on diuretics     Falls frequently     Gastroesophageal reflux disease with hiatal hernia     Generalized osteoarthritis of multiple sites     History of acute renal failure 5/31/2013    History of back injury     jumped out of second story window     History of hepatitis C     treated    History of penicillin allergy     Hypothyroidism     Hypoxemia requiring supplemental oxygen 12/29/2014    Intravenous drug user 5/2/2017    Memory difficulty     Mixed connective tissue disease ([address] 5/10/2017    Obesity, Class I, BMI 30-34.9     Olecranon bursitis of right elbow 5/4/2017    Recurrent genital herpes 5/31/2013    Right Achilles tendinitis 5/2/2017    Sarcoidosis ([address]     Sickle cell trait ([address]     Type 2 diabetes with stage 3 chronic kidney disease GFR 30-59 ([address]     Venous insufficiency     Wears glasses       Patient taking anticoagulants yes    Patient has a defibrillator: no     Assessment:   Changes in Assessment throughout shift: no     Patient has central line: no Reasons if yes: Insertion date: Last dressing date:   Patient has Renae Cath: no Reasons if yes:     Insertion date:     Last Vitals:     Vitals:    05/24/17 0818 05/24/17 1713 05/24/17 2025 05/25/17 0745   BP: 140/80 106/71 122/78 100/67 2/wheelchair    5. Estimated Discharge Date: tbd Posted on Whiteboard in Patients Room: no     [facility]    A safety check occurred in the patient's room between off going nurse and oncoming nurse listed above. The safety check included the below items  Area Items   H  High Alert Medications - Verify all high alert medication drips (heparin, PCA, etc.)   E  Equipment - Suction is set up for ALL patients (with lashay)  - Red plugs utilized for all equipment (IV pumps, etc.)  - WOWs wiped down at end of shift.  - Room stocked with oxygen, suction, and other unit-specific supplies   A  Alarms - Bed alarm is set for fall risk patients  - Ensure chair alarm is in place and activated if patient is up in a chair   L  Lines - Check IV for any infiltration  - Renae bag is empty if patient has a Renae   - Tubing and IV bags are labeled   S  Safety   - Room is clean, patient is clean, and equipment is clean. - Hallways are clear from equipment besides carts. - Fall bracelet on for fall risk patients  - Ensure room is clear and free of clutter  - Suction is set up for ALL patients (with lashay)  - Hallways are clear from equipment besides carts.    - Isolation precautions followed, supplies available outside room, sign posted   Deanna Vasquez LPN

## 2017-05-25 NOTE — PROGRESS NOTES
Samuel submitted insurance updates to request auth extension. Samuel received call that Mahamed Serrano is approved with next update on 5/28.

## 2017-05-25 NOTE — CONSULTS
Patient seen, interviewed, and chart reviewed. Patient notes the pain level 9/10 prior to her last medication dose and her pain is now 7/10. She is able to feet herself now, but her feet continues to be swollen. On examination:  Vitals - T: 98.8, P: 99, RR: 18, BP: 104/70, 02 sat: 93%  Lungs: clear to auscultation  Heart: RRR  MS: swollen wrists, hands, and feet. Gait not evaluated. Impression:  1. Inflammatory polyarthritis  2. Edematous hands and feet. Recommendations:  1. Lasix 40 mg IV x 2 dosages. 2. Toradol 15 mg IV Q-6H x 4 dosages  3. Uric acid & anti-CCP    Thank you very much for allowing me to participate in the care of this lady    Kaylee Troncoso.  MD Yadiel, F.A.C.R.  05/25/2017

## 2017-05-25 NOTE — PROGRESS NOTES
OT WEEKLY PROGRESS NOTE  Patient Name:Stephen Sinha   Time Spent With Patient  Time In: 0800  Time Out: 0900  Time In: 1130  Time Out:1200  Medical Diagnosis:  Right forearm cellulits  SIRS (systemic inflammatory response syndrome) (HCC) SIRS (systemic inflammatory response syndrome) (HCC)   Subjective: \" I cant\"'         Objective: Pt stood x 12 minutes without AE to play a table top game to improve activity tolerance with S.       Outcome Measures:      AROM: impaired R and L UEs       COGNITION/PERCEPTION Initial Assessment Weekly Progress Assessment 5/25/2017   Premorbid Reading Status Literate  Literate    Premorbid Writing Status [facility]   Arousal/Alertness Generalized responses  generalized responses    Orientation Level Oriented X4  oriented x 4    Visual Fields  (Appears Intact)  appears intact   Praxis       Body Scheme Cues to maintain midline in sitting, Cues to maintain midline in standing, Tactile, Verbal, Visual  intact   COMPREHENSION MODE Initial Assessment Weekly Progress Assessment 5/25/2017   Primary Mode of Comprehension Auditory  auditory   Hearing Aide       Corrective Lenses Glasses  glasses    Score 4  4     EXPRESSION Initial Assessment Weekly Progress Assessment 5/25/2017   Primary Mode of Expression Verbal Verbal   Score 4 4   Comments         SOCIAL INTERACTION/ PRAGMATICS Initial Assessment Weekly Progress Assessment 5/25/2017   Score 4 4   Comments         PROBLEM SOLVING Initial Assessment Weekly Progress Assessment 5/25/2017   Score 3 3   Comments         MEMORY Initial Assessment Weekly Progress Assessment 5/25/2017   Score 3 3   Comments         EATING Initial Assessment Weekly Progress Assessment 5/25/2017   Functional Level 2 Feeding/Eating  Feeding/Eating Assistance: 5 (Supervision/setup)  Comments: s/u of tray   Comments Pt demonstrates ability to scoop eggs using fork and bring them to her mouth with significantly increaed time and effort, however she fatigues easily and needs assistance to feed herself majority of a meal. Pt is able to hold a small cup with her R hand and bring to her mouth to drink. GROOMING Initial Assessment Weekly Progress Assessment 5/25/2017   Functional Level 1 Grooming  Grooming Assistance : 4 (Minimal assistance)  Adaptive Equipment:  (s/u & assist to thoroughly comb back side of hair )   Tasks completed by patient Brushed teeth, Brushed hair, Washed face     Comments OT placed washcloth in pt's R hand. She was able to partially assist in washing her face with maximal encouragement. To perform oral care, OT applied toothpaste to toothbrush and pt was able to bring toothbrush to her mouth, however pt was unable to actively brush her teeth due to decreased strength and coordination. BATHING Initial Assessment Weekly Progress Assessment 5/25/2017   Functional Level 1    Upper Body Bathing  Bathing Assistance, Upper: 5 (Supervision)  Comments: Pt washed up with cues for thoroughness and encouragement for independence   Lower Body Bathing  Bathing Assistance, Lower : 4 (Minimal assistance)  Position Performed: Seated in chair  Adaptive Equipment: Long handled sponge  Comments: assist for buttocks; vcs and encouragement otherwise     Body parts patient bathed Abdomen, Chest     Comments Pt completed full body bathing at bed level due to impaired strength, balance, and overall activity tolerance. OT placed washcloth in pt's R hand, and she was able to very minimally attempt to wash her chest and abdomen with increased cueing and coordination. Pt required the assistance of 2 people to wash buttocks as she required Max A-Total A x1 for bed mobility while 2nd person assisted with washing. Pt unable to reach to assist in washing her legs or tyrell area.        TUB/SHOWER TRANSFER INDEPENDENCE Initial Assessment Weekly Progress Assessment 5/25/2017   Score 0 Functional Transfers  Toilet Transfer : Stand pivot transfer without device;Elevated seat  Amount of Assistance Required: 5 (Supervision/setup)  Tub or Shower Type: Tub/Shower combination  Amount of Assistance Required: 5 (Supervision/setup)  Adaptive Equipment: Tub transfer bench   Comments Pt not safe to perform shower transfer at this time due to impaired strength, balance, and overall activity tolerance. UPPER BODY DRESSING/UNDRESSING Initial Assessment Weekly Progress Assessment 5/25/2017   Functional Level 1 Upper Body Dressing   Dressing Assistance : 3 (Moderate assistance) (yareli techn training; increased time )  Comments: assist to shante over head; cues to thread over elbows with increased time  (total assist for bra )   Items applied/Steps completed Bra (3 steps), Pullover (4 steps)     Comments Pt donned bra and t-shirt while sitting up on EOB with SBA for sitting balance and safety. Pt required Total A for threading shirt over BUEs and to pull shirt overhead due to impaired BUE AROM and strength. LOWER BODY DRESSING/UNDRESSING Initial Assessment Weekly Progress Assessment 5/25/2017   Functional Level 1 Lower Body Dressing   Dressing Assistance : 2 (Maximal assistance)  Position Performed: Seated in chair;Standing  Adaptive Equipment Used: Sock aid;Reacher;Dressing stick  Comments: Pt used dressing stick to doff socks and ROBERT hose with max A; pt threaded LEs into pants with mod A; breif mgmnt total; pants over hips mod A; socks with AE max A   Items applied/Steps completed Elastic waist pants (3 steps), Sock, left (1 step), Sock, right (1 step)     Comments Pt requires Total A for managing all aspects of LB dressing at bed level. Pt performed rolling with Max-Total A x1 while second person assisted with donning of brief and pants over buttocks. Pt unable to reach her feet and needs assistance for doffing/donning socks and pants over B feet.        TOILETING Initial Assessment Weekly Progress Assessment 5/25/2017   Functional Level 2 Toileting  Toileting Assistance (FIM Score): 4 (Minimal assistance) (min A for hygiene)  Cues: Physical assistance for pants down;Physical assistance for pants up;Verbal cues provided   Comments Pt completed toileting task at bed level utilizing bed pain. She needs the assistance of 2 people in order to perform all aspects of hygiene and clothing management with Max-Total A x1 for rolling. TOILET TRANSFER INDEPENDENCE Initial Assessment Weekly Progress Assessment 5/25/2017   Transfer score 0 Functional Transfers  Toilet Transfer : Stand pivot transfer without device;Elevated seat  Amount of Assistance Required: 5 (Supervision/setup)  Tub or Shower Type: Tub/Shower combination  Amount of Assistance Required: 5 (Supervision/setup)  Adaptive Equipment: Tub transfer bench   Comments Based on bed to w/c transfer, pt would require the assistance of 2 people for ant and up weightshift, balance, weightshift, RLE advance, and slow controlled descent with stand to sit to complete stand step transfer without AD to/from [facility]. Assessment: Pt slow progress not meeting established STGs this week. Pt continues to require encouragement to attempt to attain increased independence due to limited participation with activities that promote independence continually noted. Plan of Care: Please see Care Plan for updated LTGs.     Family Training:      Geraldine Flores, [address]  5/25/2017

## 2017-05-25 NOTE — PROGRESS NOTES
Problem: Mobility Impaired (Adult and Pediatric)  Goal: *Acute Goals and Plan of Care (Insert Text)  Physical Therapy Goals  STGs  Initiated 2017, updated 2017, and to be accomplished by d/c [17]  1. Patient will roll side to side in bed with independence. (S)  2. Patient will perform supine to sit and sit to supine with independence. (S)  3. Patient will transfer from bed to chair and chair to bed with supervision using the least restrictive device. (MET with RW)  4. Patient will perform sit to stand with supervision. (MET)  5. Patient will propel w/c on level surface for 150 ft with R UE and B LEs with supervision. (MET)    LTGs  Initiated 2017, updated 2017, and to be accomplished within 2 - 4 weeks [17]  1. Patient will roll side to side in bed with independence. 2. Patient will perform supine to sit and sit to supine in bed with independence. 3. Patient will transfer from bed to chair and chair to bed with distant supervision using the least restrictive device. 4. Patient will perform sit to stand with distant supervision. 5. Patient will ambulate with distant supervision for 150 feet with the least restrictive device. 5. Patient will ascend/descend 3 stairs with bilateral handrail(s) with supervision. PHYSICAL THERAPY DAILY NOTE  Patient Name:Stephen Sinha  Time In: 1330  Time Out: 1500  Patient Seen For: Transfer training; Therapeutic exercise;Gait training;Balance activities  Diagnosis: Right forearm cellulits  SIRS (systemic inflammatory response syndrome) (HCC) SIRS (systemic inflammatory response syndrome) (HCC)  Precautions: fall risk     Subjective: Pt reports BLE stiff and swollen today, do not appear any different than other days. Pain at start of tx: not rated   Pain at stop of tx: not rated      Patient identified with name and : yes          Objective:       TRANSFERS Daily Assessment     Transfer Type:  Other  Other: stand step txfrs without AD  Transfer Assistance : 5 (Stand-by assistance)  Sit to Stand Assistance: Supervision   Pt performed sit to stand with S and increased time to achieve push off. Pt performed stand step txfr with no AD and SBA with pt pushing o2 tank. GAIT Daily Assessment     Amount of Assistance: 5 (Supervision/setup)  Distance (ft): 165 Feet (ft) (25ftx2 without AD, pushing o2 tank)  Assistive Device: Walker, rolling   Pt ambulated 165ft with RW and S/dist S to bring o2 tank behind pt as pt managed o2 tubing. Pt ambulated 2x25ft to and from bathroom without AD with SBA for safety and pt managing o2 tank independently. BALANCE Daily Assessment     Sitting - Static: Good (unsupported)  Sitting - Dynamic: Fair (occasional) (+)  Standing - Static: Fair  Standing - Dynamic : Impaired   Pt participated in standing table top card game with another pt for standing balance and tolerance while also working on pt engagement activity. Pt stood x2 trials, [id number] and [id number], with no AD and with S/SBA for safety, v/c to prevent wt bearing on table. LOWER EXTREMITY EXERCISES Daily Assessment      Seated BLE ankle pumps, LAQ and hip flex 3x15, with increased time to complete, for strengthening and to improve AROM prior to ambulation due to pt's complaints of being stiff. Assessment: Pt continues to make progress with ambulation without AD but pt continues to require min cues for erect posture to improve balance. Plan of Care: Continue with current POC to improve independence with functional mobility and increase safety awareness.       Caryl Oquendo, PTA  5/25/2017

## 2017-05-25 NOTE — REHAB NOTE
SHIFT CHANGE NOTE FOR [place]  Bedside and Verbal shift change report given to [address] (oncoming nurse) by Ronit Stephenson RN   (offgoing nurse). Report included the following information SBAR, Kardex, MAR and Recent Results. Situation:   Code Status: Full Code   Reason for Admission: [facility] Day: 14   Problem List:   Hospital Problems  Date Reviewed: 5/24/2017          Codes Class Noted POA    Hypomagnesemia ICD-10-CM: E83.42  ICD-9-CM: 275.2  5/22/2017 No    Overview Signed 5/22/2017  2:29 PM by Anoop Crespo MD     Mg (5/22/2017) = 1.              Acute renal failure superimposed on stage 3 chronic kidney disease (HCC) ICD-10-CM: N17.9, N18.3  ICD-9-CM: 584.9, 585.3  5/15/2017 No        Cellulitis of right forearm ICD-10-CM: L03.113  ICD-9-CM: 682.3  5/4/2017 Yes        Olecranon bursitis of right elbow ICD-10-CM: M70.21  ICD-9-CM: 726.33  5/4/2017 Yes        Contusion of left elbow ICD-10-CM: S50.02XA  ICD-9-CM: 923.11  5/4/2017 Yes        Impaired mobility and ADLs ICD-10-CM: Z74.09  ICD-9-CM: 799.89  5/4/2017 Yes        * (Principal)SIRS (systemic inflammatory response syndrome) ([address] ICD-10-CM: R65.10  ICD-9-CM: 995.90  5/2/2017 Yes        Right Achilles tendinitis ICD-10-CM: M76.61  ICD-9-CM: 726.71  5/2/2017 Yes        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 congestive heart failure (Chronic) ICD-10-CM: I13.10, N18.3  ICD-9-CM: 404.10, 585.3  Unknown Yes    Overview Signed 4/23/2013 10:02 AM by Vivian Rangel     Better controlled             Type 2 diabetes with stage 3 chronic kidney disease GFR 30-59 (HCC) (Chronic) ICD-10-CM: E11.22, N18.3  ICD-9-CM: 250.40, 585.3  Unknown Yes              Background:   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 congestive heart failure     Better controlled     Bipolar affective disorder ([address] 12/5/2012    Cellulitis of right forearm 5/4/2017    Chronic back pain     Chronic obstructive pulmonary disease (COPD) (HCC)     SOB, on paula O2 Recent admission with psychosis     CKD stage G3a/A1, GFR 45-59 and albumin creatinine ratio <30 mg/g 5/11/2017    Urine microalbumin/Creatinine ratio (5/11/2017) = 9 mg/g    Contusion of left elbow 5/4/2017    Depression     Diabetic neuropathy associated with type 2 diabetes mellitus (HCC)     Diastolic dysfunction without heart failure     Stable on diuretics     Falls frequently     Gastroesophageal reflux disease with hiatal hernia     Generalized osteoarthritis of multiple sites     History of acute renal failure 5/31/2013    History of back injury     jumped out of second story window     History of hepatitis C     treated    History of penicillin allergy     Hypothyroidism     Hypoxemia requiring supplemental oxygen 12/29/2014    Intravenous drug user 5/2/2017    Memory difficulty     Mixed connective tissue disease ([address] 5/10/2017    Obesity, Class I, BMI 30-34.9     Olecranon bursitis of right elbow 5/4/2017    Recurrent genital herpes 5/31/2013    Right Achilles tendinitis 5/2/2017    Sarcoidosis ([address]     Sickle cell trait ([address]     Type 2 diabetes with stage 3 chronic kidney disease GFR 30-59 ([address]     Venous insufficiency     Wears glasses       Patient taking anticoagulants yes    Patient has a defibrillator: no     Assessment:   Changes in Assessment throughout shift: no     Patient has central line: no Reasons if yes: Insertion date: Last dressing date:   Patient has Renae Cath: no Reasons if yes:     Insertion date:     Last Vitals:     Vitals:    05/23/17 1704 05/23/17 1930 05/24/17 0818 05/24/17 1713   BP: 117/76 119/76 140/80 106/71   Pulse: 93 82 80 86   Resp: 18 17 18 18   Temp: 97.5 °F (36.4 °C) 98.9 °F (37.2 °C) 99.5 °F (37.5 °C) 98.9 °F (37.2 °C)   SpO2: 91% 96% 91% 95%   Weight:       Height:       LMP: 11/25/2012        PAIN    Pain Assessment    Pain Intensity 1: 7 (05/24/17 1900) Pain Intensity 1: 2 (12/29/14 1105)    Pain Location 1: Generalized Pain Location 1: Abdomen    Pain Intervention(s) 1: Medication (see MAR) Pain Intervention(s) 1: Medication (see MAR)  Patient Stated Pain Goal: 0 Patient Stated Pain Goal: 0  o Intervention effective: yes   o Other actions taken for pain: repositioning     Skin Assessment  Skin color Skin Color: Appropriate for ethnicity  Condition/Temperature Skin Condition/Temp: Dry, Warm  Integrity Skin Integrity: Intact  Turgor Turgor: Non-tenting  Weekly Pressure Ulcer Documentation  Pressure  Injury Documentation: No Pressure Injury Noted-Pressure Ulcer Prevention Initiated  Wound Prevention & Protection Methods  Orientation of wound Orientation of Wound Prevention: Posterior  Location of Prevention Location of Wound Prevention: Sacrum/Coccyx  Dressing Present Dressing Present : No  Dressing Status    Wound Offloading Wound Offloading (Prevention Methods): Bed, pressure redistribution/air, Chair cushion, Repositioning, Turning, Wheelchair     INTAKE/OUPUT    Date 05/23/17 1900 - 05/24/17 0659 05/24/17 0700 - 05/25/17 0659   Shift 3629-4411 24 Hour Total 8896-6490 4208-7264 24 Hour Total   I  N  T  A  K  E   P.O.  460 540  540      P. O.  460 540  540    Shift Total  (mL/kg)  460  (4.8) 540  (5.7)  540  (5.7)   O  U  T  P  U  T   Urine  (mL/kg/hr)           Urine Occurrence(s) 7 x 9 x 3 x  3 x    Stool           Stool Occurrence(s) 0 x 0 x 0 x  0 x    Shift Total  (mL/kg)        NET  460 540  540   Weight (kg) 95.3 95.3 95.3 95.3 95.3       Recommendations:  1. Patient needs and requests: none    2. Diet: diabetic    3. Pending tests/procedures: am labs     4.  Functional Level/Equipment: assist x 2/wheelchair    5. Estimated Discharge Date: tbd Posted on Whiteboard in Patients Room: no     [facility]    A safety check occurred in the patient's room between off going nurse and oncoming nurse listed above. The safety check included the below items  Area Items   H  High Alert Medications - Verify all high alert medication drips (heparin, PCA, etc.)   E  Equipment - Suction is set up for ALL patients (with lashay)  - Red plugs utilized for all equipment (IV pumps, etc.)  - WOWs wiped down at end of shift.  - Room stocked with oxygen, suction, and other unit-specific supplies   A  Alarms - Bed alarm is set for fall risk patients  - Ensure chair alarm is in place and activated if patient is up in a chair   L  Lines - Check IV for any infiltration  - Renae bag is empty if patient has a Renae   - Tubing and IV bags are labeled   S  Safety   - Room is clean, patient is clean, and equipment is clean. - Hallways are clear from equipment besides carts. - Fall bracelet on for fall risk patients  - Ensure room is clear and free of clutter  - Suction is set up for ALL patients (with lashay)  - Hallways are clear from equipment besides carts.    - Isolation precautions followed, supplies available outside room, sign posted   Rosendo Wise RN

## 2017-05-25 NOTE — PROGRESS NOTES
completed follow up visit with patient and offered Pastoral care, see flow sheets for interventions. Patient was in hallway of rehab and asked  to pray. We visited with another patient for several minutes and talked about progress and the pain they are both going through. They were both supporting each other and talking in positive tones about their recuperation. Chaplains will continue to follow and will provide pastoral care  as needed or requested.    Fitzgerald Cecilia, [address]  Board Certified [address] [place] [address]  Office [phone]

## 2017-05-25 NOTE — PROGRESS NOTES
[facility] PHYSICAL REHABILITATION  [address] [place] [address], [address]     INPATIENT REHABILITATION  DAILY PROGRESS NOTE     Date: 5/25/2017    Name: Fatuma Patel Age / Sex: 62 y.o. / female   CSN: [id number] MRN: [id number]   [facility] Date: 5/10/2017 Length of Stay: 15 days     Primary Rehab Diagnosis: Impaired Mobility and ADLs secondary to Systemic inflammatory response syndrome (SIRS) secondary to:  1. Cellulitis of the right forearm  2. Olecranon bursitis of the right elbow  3. Contusion of left elbow  4. Right Achilles tendonitis      Subjective:     Patient seen and examined. Blood pressure controlled. Blood sugars better controlled. Patient's Complaint:   No significant medical complaints     Pain Control: ongoing significant pain in which is stable and controlled by current meds      Objective:     Vital Signs:  Patient Vitals for the past 24 hrs:   BP Temp Pulse Resp SpO2   05/25/17 0745 100/67 98.2 °F (36.8 °C) 79 20 95 %   05/24/17 2025 122/78 98.6 °F (37 °C) 87 15 93 %   05/24/17 1713 106/71 98.9 °F (37.2 °C) 86 18 95 %        Physical Examination:  GENERAL SURVEY: Patient is awake, alert, oriented x 3, sitting comfortably on the chair, not in acute respiratory distress. HEENT: pink palpebral conjunctivae, anicteric sclerae, no nasoaural discharge, moist oral mucosa  NECK: supple, no jugular venous distention, no palpable lymph nodes  CHEST/LUNGS: symmetrical chest expansion, good air entry, clear breath sounds  HEART: adynamic precordium, good S1 S2, no S3, regular rhythm, no murmurs  ABDOMEN: obese, bowel sounds appreciated, soft, non-tender  EXTREMITIES: (+) left hand wrapped in gauze, pink nailbeds, (+) decreased warmth/erythema/swelling of both elbows, (+) bipedal edema, full and equal pulses, no calf tenderness   NEUROLOGICAL EXAM: The patient is awake, alert and oriented x3, able to answer questions fairly appropriately, able to follow 1 and 2 step commands.  Able to tell time from the wall clock. Cranial nerves II-XII are grossly intact. No gross sensory deficit. Motor strength is 2+/5 on both shoulders, 3/5 on the right elbow, 3-/5 on the left elbow, 2+/5 on both wrists, 3+/5 on the right handgrip, 3/5 on the left handgrip, 3-/5 on the right hip, 3/5 on the left hip, 3-/5 on both knees and both ankles.       Current Medications:  Current Facility-Administered Medications   Medication Dose Route Frequency    insulin glargine (LANTUS) injection 55 Units  55 Units SubCUTAneous ACB    insulin glargine (LANTUS) injection 25 Units  25 Units SubCUTAneous QHS    magnesium oxide (MAG-OX) tablet 800 mg  800 mg Oral BID    oxyCODONE-acetaminophen (PERCOCET) 5-325 mg per tablet 1 Tab  1 Tab Oral TID    oxyCODONE-acetaminophen (PERCOCET) 5-325 mg per tablet 1 Tab  1 Tab Oral Q4H PRN    tolterodine (DETROL) tablet 1 mg  1 mg Oral BID    famotidine (PEPCID) tablet 20 mg  20 mg Oral DAILY    gabapentin (NEURONTIN) capsule 300 mg  300 mg Oral Q12H    diphenhydrAMINE (BENADRYL) capsule 25 mg  25 mg Oral QHS    cholecalciferol (VITAMIN D3) tablet 1,000 Units  1,000 Units Oral DAILY    acetaminophen (TYLENOL) tablet 650 mg  650 mg Oral Q4H PRN    docusate sodium (COLACE) capsule 100 mg  100 mg Oral BID    bisacodyl (DULCOLAX) tablet 10 mg  10 mg Oral Q48H PRN    heparin (porcine) injection 5,000 Units  5,000 Units SubCUTAneous Q8H    insulin lispro (HUMALOG) injection   SubCUTAneous TIDAC    albuterol (PROVENTIL VENTOLIN) nebulizer solution 2.5 mg  2.5 mg Nebulization Q4H PRN    divalproex DR (DEPAKOTE) tablet 250 mg  250 mg Oral QHS    ARIPiprazole (ABILIFY) tablet 10 mg  10 mg Oral DAILY    traZODone (DESYREL) tablet 100 mg  100 mg Oral QHS    rosuvastatin (CRESTOR) tablet 5 mg  5 mg Oral QHS    aspirin chewable tablet 81 mg  81 mg Oral DAILY WITH BREAKFAST    levothyroxine (SYNTHROID) tablet 100 mcg  100 mcg Oral ACB    sertraline (ZOLOFT) tablet 50 mg  50 mg Oral DAILY    umeclidinium-vilanterol (ANORO ELLIPTA) 62.5 mcg- 25 mcg/inhalation  1 Puff Inhalation QAM RT    B complex-vitaminC-folic acid (NEPHROCAP) cap  1 Cap Oral DAILY       Allergies:   Allergies   Allergen Reactions    Moxifloxacin Rash    Pcn [Penicillins] Swelling    Sulfa (Sulfonamide Antibiotics) Swelling       Functional Progress:    OCCUPATIONAL THERAPY    ON ADMISSION MOST RECENT   Eating  Functional Level: 2   Eating  Functional Level: 2     Grooming  Functional Level: 1   Grooming  Functional Level: 1     Bathing  Functional Level: 1   Bathing  Functional Level: 1     Upper Body Dressing  Functional Level: 1   Upper Body Dressing  Functional Level: 1     Lower Body Dressing  Functional Level: 1   Lower Body Dressing  Functional Level: 1     Toileting  Functional Level: 2   Toileting  Functional Level: 1     Toilet Transfers  Toilet Transfer Score: 0   Toilet Transfers  Toilet Transfer Score: 1     Tub /Shower Transfers  Tub/Shower Transfer Score: 0   Tub/Shower Transfers  Tub/Shower Transfer Score: 0       Legend:   7 - Independent   6 - Modified Independent   5 - Standby Assistance / Supervision / Set-up   4 - Minimum Assistance / Contact Guard Assistance   3 - Moderate Assistance   2 - Maximum Assistance   1 - Total Assistance / Dependent       Lab/Data Review:  Recent Results (from the past 24 hour(s))   GLUCOSE, POC    Collection Time: 05/24/17 12:34 PM   Result Value Ref Range    Glucose (POC) 199 (H) 70 - 110 mg/dL   GLUCOSE, POC    Collection Time: 05/24/17  5:17 PM   Result Value Ref Range    Glucose (POC) 226 (H) 70 - 110 mg/dL   GLUCOSE, POC    Collection Time: 05/24/17  8:38 PM   Result Value Ref Range    Glucose (POC) 202 (H) 70 - 658 mg/dL   METABOLIC PANEL, BASIC    Collection Time: 05/25/17  6:13 AM   Result Value Ref Range    Sodium 140 136 - 145 mmol/L    Potassium 4.2 3.5 - 5.5 mmol/L    Chloride 99 (L) 100 - 108 mmol/L    CO2 33 (H) 21 - 32 mmol/L    Anion gap 8 3.0 - 18 mmol/L    Glucose 106 (H) 74 - 99 mg/dL    BUN 20 (H) 7.0 - 18 MG/DL    Creatinine 1.51 (H) 0.6 - 1.3 MG/DL    BUN/Creatinine ratio 13 12 - 20      GFR est AA 43 (L) >60 ml/min/1.73m2    GFR est non-AA 35 (L) >60 ml/min/1.73m2    Calcium 10.2 (H) 8.5 - 10.1 MG/DL   HGB & HCT    Collection Time: 05/25/17  6:13 AM   Result Value Ref Range    HGB 10.0 (L) 12.0 - 16.0 g/dL    HCT 31.5 (L) 35.0 - 45.0 %   PLATELET    Collection Time: 05/25/17  6:13 AM   Result Value Ref Range    PLATELET 846 107 - 753 K/uL   MAGNESIUM    Collection Time: 05/25/17  6:13 AM   Result Value Ref Range    Magnesium 1.7 1.6 - 2.6 mg/dL   GLUCOSE, POC    Collection Time: 05/25/17  8:07 AM   Result Value Ref Range    Glucose (POC) 139 (H) 70 - 110 mg/dL       Estimated Glomerular Filtration Rate:  CKD-EPI:   On admission, estimated GFR was 53.6 mL/min/1.73m2 based on a Creatinine of 1.26 mg/dl. Most recent estimated GFR was 39.6 mL/min/1.73m2 based on a Creatinine of 1.62 mg/dl. MDRD:   On admission, estimated GFR was 55.7 mL/min/1.73m2 based on a Creatinine of 1.26 mg/dl. Most recent estimated GFR was 41.7 mL/min/1.73m2 based on a Creatinine of 1.62 mg/dl. Assessment:     Primary Rehabilitation Diagnosis  1. Impaired Mobility and ADLs  2. Systemic inflammatory response syndrome (SIRS) secondary to:   a. Cellulitis of the right forearm   b. Olecranon bursitis of the right elbow   c. Contusion of left elbow   d.  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 heart failure    Depression    History of back injury    Anxiety    Wears glasses    History of hepatitis C    Sickle cell trait ([facility]    History of acute renal failure    Hypothyroidism    Recurrent genital herpes    Sarcoidosis ([facility]    History pf abscess of right arm    Hypoxemia requiring supplemental oxygen    Abnormal nuclear stress test    History of cellulitis and abscess of hand    History of cellulitis and abscess of foot    Intravenous drug user    History of penicillin allergy    Falls frequently    Gastroesophageal reflux disease with hiatal hernia    Memory difficulty    Mixed connective tissue disease     CKD stage G3a/A1, GFR 45-59 and albumin creatinine ratio <30 mg/g     Acute renal failure superimposed on stage 3 chronic kidney disease    Hypomagnesemia       Plan:     1. Justification for continued stay: Good progression towards established rehabilitation goals. 2. Medical Issues being followed closely:    [x]  Fall and safety precautions     [x]  Wound Care     [x]  Bowel and Bladder Function     [x]  Fluid Electrolyte and Nutrition Balance     [x]  Pain Control      3. Issues that 24 hour rehabilitation nursing is following:    [x]  Fall and safety precautions     [x]  Wound Care     [x]  Bowel and Bladder Function     [x]  Fluid Electrolyte and Nutrition Balance     [x]  Pain Control      [x]  Assistance with and education on in-room safety with transfers to and from the bed, wheelchair, toilet and shower. 4. Acute rehabilitation plan of care:    [x]  Continue current care and rehab. [x]  Physical Therapy           [x]  Occupational Therapy           []  Speech Therapy     []  Hold Rehab until further notice     5. Medications:    [x]  MAR Reviewed     [x]  Continue Present Medications     6.  DVT Prophylaxis:      []  Lovenox     [x]  Unfractionated Heparin     []  Coumadin []  NOAC     [x]  ROBERT Stockings     []  Sequential Compression Device     []  None     7.  Orders:   > Systemic inflammatory response syndrome (SIRS) secondary to Cellulitis of the right forearm, Olecranon bursitis of the right elbow, Contusion of left elbow and Right Achilles tendonitis    > Patient had completed the recommended treatment course of oral Clindamycin on 5/13/2017     > Abnormally low HDL with hypertriglyceridemia   > Continue Rosuvastatin 5 mg PO q HS     > 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 congestive heart failure    > On 5/11/2017, discontinued Furosemide 20 mg PO once daily (re: rising Creatinine)     > Bipolar affective disease   > Continue:    > Aripiprazole 10 mg PO once daily    > Divalproex  mg PO q HS     > Chronic obstructive pulmonary disease   > Continue:    > Anoro Ellipta 1 puff inhaled once daily    > Albuterol nebulization q 4 hr PRN for shortness of breath     > Depression / Anxiety   > Continue:    > Aripiprazole 10 mg PO once daily    > Sertraline 50 mg PO once daily    > Trazodone 100 mg PO q HS     > Diabetic neuropathy   > On 5/15/2017, decreased Gabapentin from 300 mg PO q 8 hr to 300 mg PO q 12 hr (re: decrease in CrCl)   > Continue Gabapentin 300 mg PO q 12 hr     > Diastolic dysfunction without heart failure   > On 5/11/2017, discontinued Furosemide 20 mg PO once daily (re: rising Creatinine)   > On 5/14/2017, resumed Eplerenone 25 mg PO once daily    > On 5/15/2017, discontinued Eplerenone 25 mg PO once daily (re: decrease in CrCl)     > Gastroesophageal reflux disease with hiatal hernia   > On 5/15/2017, decreased Famotidine from 20 mg PO BID to 20 mg PO once daily   > Continue Famotidine 20 mg PO once daily     > Hypothyroidism   > Continue Levothyroxine 100 mcg PO once daily before breakfast     > Hypoxemia requiring supplemental oxygen   > Continue O2 inhalation 2 LPM via nasal cannula continuous     > Type 2 diabetes mellitus with diabetic neuropathy and stage 3 chronic kidney disease    > Prior to admission to [facility], the patient claimed she wa snot using Lantus at home; instead, she was using Insulin U500 on a sliding scale basis   > On 5/18/2017, changed Lantus from 65 units SC q HS to 40 units SC q AM and 20 units SC q PM   > On 5/19/2017:    > Increased Lantus from 40 units to 44 units SC q AM    > Increased Lantus from 20 units to 22 units SC q PM   > On 5/22/2017:    > Increased Lantus from 44 units to 50 units SC q AM    > Increased Lantus from 22 units to 25 units SC q PM   > On 5/24/2017, increased Lantus from 50 units to 55 units SC q AM   > Continue:    > Increase Lantus from 55 units to 60 units SC q AM    > Lantus 25 units SC q PM    > Humalog insulin sliding scale SC TID AC only     > Hypomagnesemia   > Mg (5/11/2017, on admission) = 1.6   > Patient was started on Mg(OH)2 400 mg PO BID   > Mg (5/22/2017) = 1.5   > On 5/22/2017, increased Mg(OH)2 from 400 mg to 800 mg PO BID   > Mg (5/25/2017) = 1.7   > Continue Mg(OH)2 800 mg PO BID    > CKD stage G3a/A1, GFR 45-59 and albumin creatinine ratio <30 mg/g     > On admission, based on a Creatinine of 1.26 mg/dl:    Estimated GFR using CKD-EPI = 53.6 mL/min/1.73m2    Estimated GFR using MDRD = 55.7 mL/min/1.73m2    > Urine microalbumin/Creatinine ratio (5/11/2017) = 9 mg/g    > Acute renal failure superimposed on stage 3 chronic kidney disease   > BUN/Creatinine (5/10/2017) = 19/1.33    > BUN/Creatinine (5/11/2017, on admission) = 16/1.26    > On 5/12/2017:    > Discontinued Diclofenac 2 grams applied to affected areas 4 times daily     > Started Naproxen 375 mg PO BID with meals   > On 5/14/2017, resumed Eplerenone 25 mg PO once daily    > BUN/Creatinine (5/15/2017) = 22/1.87    > Urinalysis (5/15/2017) showed SG 1.014, no casts   > On 5/15/2017:    > Discontinued Naproxen 375 mg PO BID with meals    > Discontinued Eplerenone 25 mg PO once daily     > Decreased Gabapentin from 300 mg PO q 8 hr to 300 mg PO q 12 hr     > Decreased Famotidine from 20 mg PO BID to 20 mg PO once daily    > Patient was given IVF: 0.9%  ml/hr x 1 liter   > On 5/16/2017, patient was given IVF: 0.9%  ml/hr x 1 liter   > On 5/17/2017, patient was given IVF: 0.9%  ml/hr x 1 liter   > On 5/18/2017, patient was given IVF: 0.9%  ml/hr x 1 liter   > BUN/Creatinine (5/20/2017) = 16/1.58    > BUN/Creatinine (5/21/2017) = 17/1.58   > BUN/Creatinine (5/22/2017) = 19/1.62   > On 5/22/2017, patient was given IVF: 0.9%  ml/hr x 1 liter   > On 5/23/2017, patient was given IVF: 0.9%  ml/hr x 1 liter   > BUN/Creatinine (5/25/2017) = 20/1.51    > Dr. Frandy Murray ordered Furosemide 40 mg IV x 2 doses   > Increase oral fluid intake    > Difficulty sleeping    > Continue:    > Trazodone 100 mg PO q HS    > Diphenhydramine HCl 25 mg PO q HS    > Urinary urge incontinence   > On 5/15/2017, added Diphenhydramine HCl 25 mg PO q HS (re: ADR is urinary retention)   > On 5/17/2017, added Tolterodine 1 mg PO BID   > Urinalysis   > Continue:    > Tolterodine 1 mg PO BID    > Diphenhydramine HCl 25 mg PO q HS    > Constipation   > Add Pericolace 2 tabs PO once daily after dinner   > Continue:    > Decrease Docusate sodium to 100 mg PO once daily after breakfast    > Mg(OH)2 800 mg PO BID    > Analgesia   > On 5/12/2017:    > Discontinued Diclofenac 2 grams applied to affected areas 4 times daily     > Started Naproxen 375 mg PO BID with meals   > On 5/15/2017, discontinued Naproxen 375 mg PO BID with meals (re: decrease in CrCl)   > On 5/17/2017:    > Discontinued Norco 5/325 1 tab PO q 4 hr PRN for pain level greater than 4/10    > Added:     > Percocet 5/325 1 tab PO TID (8AM, 12PM, 4PM)     > Percocet 5/325 1 tab PO q 4 hr PRN for pain level greater than 4/10 (from 8PM to 4AM only)   > Dr. Frandy Murray ordered Ketorolac 15 mg IV q 6 hr x 4 doses   > Continue:    > Acetaminophen 650 mg PO q 4 hr PRN for pain level less than 5/10    > Percocet 5/325 1 tab PO TID (8AM, 12PM, 4PM)    > Percocet 5/325 1 tab PO q 4 hr PRN for pain level greater than 4/10 (from 8PM to 4AM only)        8. Patient's progress in rehabilitation and medical issues discussed with the patient. All questions answered to the best of my ability. Care plan discussed with patient and nurse.       SignedSteen MD James    May 25, 2017

## 2017-05-25 NOTE — REHAB NOTE
[facility]  Bedside and Verbal shift change report given to Kunal Garcia LPN (oncoming nurse) by Demetrius Comer RN   (offgoing nurse). Report included the following information SBAR, Kardex, MAR and Recent Results. Situation:   Code Status: Full Code   Reason for Admission: [address] [facility] Day: 15   Problem List:   Hospital Problems  Date Reviewed: 5/24/2017          Codes Class Noted POA    Hypomagnesemia ICD-10-CM: E83.42  ICD-9-CM: 275.2  5/22/2017 No    Overview Signed 5/22/2017  2:29 PM by Jordan Perez MD     Mg (5/22/2017) = 1.              Acute renal failure superimposed on stage 3 chronic kidney disease (HCC) ICD-10-CM: N17.9, N18.3  ICD-9-CM: 584.9, 585.3  5/15/2017 No        Cellulitis of right forearm ICD-10-CM: L03.113  ICD-9-CM: 682.3  5/4/2017 Yes        Olecranon bursitis of right elbow ICD-10-CM: M70.21  ICD-9-CM: 726.33  5/4/2017 Yes        Contusion of left elbow ICD-10-CM: S50.02XA  ICD-9-CM: 923.11  5/4/2017 Yes        Impaired mobility and ADLs ICD-10-CM: Z74.09  ICD-9-CM: 799.89  5/4/2017 Yes        * (Principal)SIRS [facility]systemic inflammatory response syndrome) [facility] ICD-10-CM: R65.10  ICD-9-CM: 995.90  5/2/2017 Yes        Right Achilles tendinitis ICD-10-CM: M76.61  ICD-9-CM: 726.71  5/2/2017 Yes        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 congestive heart failure (Chronic) ICD-10-CM: I13.10, N18.3  ICD-9-CM: 404.10, 585.3  Unknown Yes    Overview Signed 4/23/2013 10:02 AM by Robel Laughlin     Better controlled             Type 2 diabetes with stage 3 chronic kidney disease GFR 30-59 (HCC) (Chronic) ICD-10-CM: E11.22, N18.3  ICD-9-CM: 250.40, 585.3  Unknown Yes              Background:   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 congestive heart failure     Better controlled     Bipolar affective disorder ([address] 12/5/2012    Cellulitis of right forearm 5/4/2017    Chronic back pain     Chronic obstructive pulmonary disease (COPD) (HCC)     SOB, on paula O2 Recent admission with psychosis     CKD stage G3a/A1, GFR 45-59 and albumin creatinine ratio <30 mg/g 5/11/2017    Urine microalbumin/Creatinine ratio (5/11/2017) = 9 mg/g    Contusion of left elbow 5/4/2017    Depression     Diabetic neuropathy associated with type 2 diabetes mellitus (HCC)     Diastolic dysfunction without heart failure     Stable on diuretics     Falls frequently     Gastroesophageal reflux disease with hiatal hernia     Generalized osteoarthritis of multiple sites     History of acute renal failure 5/31/2013    History of back injury     jumped out of second story window     History of hepatitis C     treated    History of penicillin allergy     Hypothyroidism     Hypoxemia requiring supplemental oxygen 12/29/2014    Intravenous drug user 5/2/2017    Memory difficulty     Mixed connective tissue disease ([address] 5/10/2017    Obesity, Class I, BMI 30-34.9     Olecranon bursitis of right elbow 5/4/2017    Recurrent genital herpes 5/31/2013    Right Achilles tendinitis 5/2/2017    Sarcoidosis ([address]     Sickle cell trait ([address]     Type 2 diabetes with stage 3 chronic kidney disease GFR 30-59 ([address]     Venous insufficiency     Wears glasses       Patient taking anticoagulants yes    Patient has a defibrillator: no     Assessment:   Changes in Assessment throughout shift: no     Patient has central line: no Reasons if yes: Insertion date: Last dressing date:   Patient has Renae Cath: no Reasons if yes:     Insertion date:     Last Vitals:     Vitals:    05/23/17 1930 05/24/17 0818 05/24/17 1713 05/24/17 2025   BP: 119/76 140/80 106/71 122/78 Pulse: 82 80 86 87   Resp: 17 18 18 15   Temp: 98.9 °F (37.2 °C) 99.5 °F (37.5 °C) 98.9 °F (37.2 °C) 98.6 °F (37 °C)   SpO2: 96% 91% 95% 93%   Weight:       Height:       LMP: 11/25/2012        PAIN    Pain Assessment    Pain Intensity 1: 0 (05/25/17 0000) Pain Intensity 1: 2 (12/29/14 1105)    Pain Location 1: Generalized Pain Location 1: Abdomen    Pain Intervention(s) 1: Medication (see MAR) Pain Intervention(s) 1: Medication (see MAR)  Patient Stated Pain Goal: 0 Patient Stated Pain Goal: 0  o Intervention effective: yes   o Other actions taken for pain: repositioning     Skin Assessment  Skin color Skin Color: Appropriate for ethnicity  Condition/Temperature Skin Condition/Temp: Dry  Integrity Skin Integrity: Wound (add Wound LDA)  Turgor Turgor: Non-tenting  Weekly Pressure Ulcer Documentation  Pressure  Injury Documentation: No Pressure Injury Noted-Pressure Ulcer Prevention Initiated  Wound Prevention & Protection Methods  Orientation of wound Orientation of Wound Prevention: Posterior  Location of Prevention Location of Wound Prevention: Sacrum/Coccyx  Dressing Present Dressing Present : No  Dressing Status    Wound Offloading Wound Offloading (Prevention Methods): Bed, pressure redistribution/air     INTAKE/OUPUT    Date 05/24/17 0700 - 05/25/17 0659 05/25/17 0700 - 05/26/17 0659   Shift 1268-9432 3794-4194 24 Hour Total 4684-9736 6870-5329 24 Hour Total   I  N  T  A  K  E   P. O. 540  540         P. O. 540  540       Shift Total  (mL/kg) 540  (5.7)  540  (5.7)      O  U  T  P  U  T   Urine  (mL/kg/hr)            Urine Occurrence(s) 3 x 3 x 6 x       Stool            Stool Occurrence(s) 0 x 0 x 0 x       Shift Total  (mL/kg)           540      Weight (kg) 95.3 95.3 95.3 95.3 95.3 95.3       Recommendations:  1. Patient needs and requests: none    2. Diet: diabetic    3. Pending tests/procedures: am labs     4. Functional Level/Equipment: assist x 2/wheelchair    5.  Estimated Discharge Date: tbd Posted on Whiteboard in Patients Room: no     [facility]    A safety check occurred in the patient's room between off going nurse and oncoming nurse listed above. The safety check included the below items  Area Items   H  High Alert Medications - Verify all high alert medication drips (heparin, PCA, etc.)   E  Equipment - Suction is set up for ALL patients (with lashay)  - Red plugs utilized for all equipment (IV pumps, etc.)  - WOWs wiped down at end of shift.  - Room stocked with oxygen, suction, and other unit-specific supplies   A  Alarms - Bed alarm is set for fall risk patients  - Ensure chair alarm is in place and activated if patient is up in a chair   L  Lines - Check IV for any infiltration  - Renae bag is empty if patient has a Renae   - Tubing and IV bags are labeled   S  Safety   - Room is clean, patient is clean, and equipment is clean. - Hallways are clear from equipment besides carts. - Fall bracelet on for fall risk patients  - Ensure room is clear and free of clutter  - Suction is set up for ALL patients (with lashay)  - Hallways are clear from equipment besides carts.    - Isolation precautions followed, supplies available outside room, sign posted   Lukas Sim RN

## 2017-05-26 LAB
APPEARANCE UR: CLEAR
BACTERIA URNS QL MICRO: ABNORMAL /HPF
BILIRUB UR QL: NEGATIVE
COLOR UR: YELLOW
EPITH CASTS URNS QL MICRO: ABNORMAL /LPF (ref 0–5)
GLUCOSE BLD STRIP.AUTO-MCNC: 106 MG/DL (ref 70–110)
GLUCOSE BLD STRIP.AUTO-MCNC: 163 MG/DL (ref 70–110)
GLUCOSE BLD STRIP.AUTO-MCNC: 174 MG/DL (ref 70–110)
GLUCOSE BLD STRIP.AUTO-MCNC: 240 MG/DL (ref 70–110)
GLUCOSE UR STRIP.AUTO-MCNC: NEGATIVE MG/DL
HGB UR QL STRIP: NEGATIVE
KETONES UR QL STRIP.AUTO: NEGATIVE MG/DL
LEUKOCYTE ESTERASE UR QL STRIP.AUTO: ABNORMAL
NITRITE UR QL STRIP.AUTO: NEGATIVE
PH UR STRIP: 5 [PH] (ref 5–8)
PROT UR STRIP-MCNC: NEGATIVE MG/DL
RBC #/AREA URNS HPF: ABNORMAL /HPF (ref 0–5)
SP GR UR REFRACTOMETRY: 1.01 (ref 1–1.03)
UROBILINOGEN UR QL STRIP.AUTO: 0.2 EU/DL (ref 0.2–1)
WBC URNS QL MICRO: ABNORMAL /HPF (ref 0–4)

## 2017-05-26 PROCEDURE — 87086 URINE CULTURE/COLONY COUNT: CPT | Performed by: INTERNAL MEDICINE

## 2017-05-26 PROCEDURE — 87077 CULTURE AEROBIC IDENTIFY: CPT | Performed by: INTERNAL MEDICINE

## 2017-05-26 PROCEDURE — 97110 THERAPEUTIC EXERCISES: CPT

## 2017-05-26 PROCEDURE — 87186 SC STD MICRODIL/AGAR DIL: CPT | Performed by: INTERNAL MEDICINE

## 2017-05-26 PROCEDURE — 82962 GLUCOSE BLOOD TEST: CPT

## 2017-05-26 PROCEDURE — 74011000258 HC RX REV CODE- 258: Performed by: INTERNAL MEDICINE

## 2017-05-26 PROCEDURE — 81001 URINALYSIS AUTO W/SCOPE: CPT | Performed by: INTERNAL MEDICINE

## 2017-05-26 PROCEDURE — 97530 THERAPEUTIC ACTIVITIES: CPT

## 2017-05-26 PROCEDURE — 74011250637 HC RX REV CODE- 250/637: Performed by: INTERNAL MEDICINE

## 2017-05-26 PROCEDURE — 97150 GROUP THERAPEUTIC PROCEDURES: CPT

## 2017-05-26 PROCEDURE — 97535 SELF CARE MNGMENT TRAINING: CPT

## 2017-05-26 PROCEDURE — 97116 GAIT TRAINING THERAPY: CPT

## 2017-05-26 PROCEDURE — 74011636637 HC RX REV CODE- 636/637: Performed by: INTERNAL MEDICINE

## 2017-05-26 PROCEDURE — 74011250636 HC RX REV CODE- 250/636: Performed by: SPECIALIST

## 2017-05-26 PROCEDURE — 74011250636 HC RX REV CODE- 250/636: Performed by: INTERNAL MEDICINE

## 2017-05-26 PROCEDURE — 65310000000 HC RM PRIVATE REHAB

## 2017-05-26 RX ORDER — INSULIN GLARGINE 100 [IU]/ML
65 INJECTION, SOLUTION SUBCUTANEOUS
Status: DISCONTINUED | OUTPATIENT
Start: 2017-05-27 | End: 2017-05-30

## 2017-05-26 RX ORDER — INSULIN GLARGINE 100 [IU]/ML
60 INJECTION, SOLUTION SUBCUTANEOUS
Status: DISCONTINUED | OUTPATIENT
Start: 2017-05-27 | End: 2017-05-26

## 2017-05-26 RX ORDER — INSULIN GLARGINE 100 [IU]/ML
25 INJECTION, SOLUTION SUBCUTANEOUS
Status: DISCONTINUED | OUTPATIENT
Start: 2017-05-26 | End: 2017-05-30

## 2017-05-26 RX ADMIN — KETOROLAC TROMETHAMINE 15 MG: 15 INJECTION, SOLUTION INTRAMUSCULAR; INTRAVENOUS at 06:30

## 2017-05-26 RX ADMIN — ROSUVASTATIN CALCIUM 5 MG: 5 TABLET ORAL at 20:52

## 2017-05-26 RX ADMIN — INSULIN GLARGINE 60 UNITS: 100 INJECTION, SOLUTION SUBCUTANEOUS at 09:38

## 2017-05-26 RX ADMIN — INSULIN LISPRO 2 UNITS: 100 INJECTION, SOLUTION INTRAVENOUS; SUBCUTANEOUS at 09:35

## 2017-05-26 RX ADMIN — CHOLECALCIFEROL TAB 25 MCG (1000 UNIT) 1000 UNITS: 25 TAB at 09:33

## 2017-05-26 RX ADMIN — ASPIRIN 81 MG CHEWABLE TABLET 81 MG: 81 TABLET CHEWABLE at 09:20

## 2017-05-26 RX ADMIN — SERTRALINE HYDROCHLORIDE 50 MG: 50 TABLET ORAL at 09:34

## 2017-05-26 RX ADMIN — Medication 800 MG: at 19:38

## 2017-05-26 RX ADMIN — GABAPENTIN 300 MG: 300 CAPSULE ORAL at 19:38

## 2017-05-26 RX ADMIN — STANDARDIZED SENNA CONCENTRATE AND DOCUSATE SODIUM 2 TABLET: 8.6; 5 TABLET, FILM COATED ORAL at 19:38

## 2017-05-26 RX ADMIN — ARIPIPRAZOLE 10 MG: 10 TABLET ORAL at 09:33

## 2017-05-26 RX ADMIN — DIVALPROEX SODIUM 250 MG: 250 TABLET, DELAYED RELEASE ORAL at 20:52

## 2017-05-26 RX ADMIN — LEVOTHYROXINE SODIUM 100 MCG: 100 TABLET ORAL at 06:31

## 2017-05-26 RX ADMIN — GABAPENTIN 300 MG: 300 CAPSULE ORAL at 06:31

## 2017-05-26 RX ADMIN — OXYCODONE HYDROCHLORIDE AND ACETAMINOPHEN 1 TABLET: 5; 325 TABLET ORAL at 12:47

## 2017-05-26 RX ADMIN — HEPARIN SODIUM 5000 UNITS: 5000 INJECTION, SOLUTION INTRAVENOUS; SUBCUTANEOUS at 21:08

## 2017-05-26 RX ADMIN — TOLTERODINE TARTRATE 1 MG: 1 TABLET, FILM COATED ORAL at 19:38

## 2017-05-26 RX ADMIN — TOLTERODINE TARTRATE 1 MG: 1 TABLET, FILM COATED ORAL at 09:33

## 2017-05-26 RX ADMIN — HEPARIN SODIUM 5000 UNITS: 5000 INJECTION, SOLUTION INTRAVENOUS; SUBCUTANEOUS at 13:25

## 2017-05-26 RX ADMIN — NEPHROCAP 1 CAPSULE: 1 CAP ORAL at 09:20

## 2017-05-26 RX ADMIN — INSULIN LISPRO 4 UNITS: 100 INJECTION, SOLUTION INTRAVENOUS; SUBCUTANEOUS at 12:47

## 2017-05-26 RX ADMIN — FAMOTIDINE 20 MG: 20 TABLET ORAL at 09:33

## 2017-05-26 RX ADMIN — INSULIN GLARGINE 25 UNITS: 100 INJECTION, SOLUTION SUBCUTANEOUS at 19:39

## 2017-05-26 RX ADMIN — CEFTRIAXONE 1 G: 1 INJECTION, POWDER, FOR SOLUTION INTRAMUSCULAR; INTRAVENOUS at 19:39

## 2017-05-26 RX ADMIN — FUROSEMIDE 40 MG: 10 INJECTION, SOLUTION INTRAVENOUS at 09:33

## 2017-05-26 RX ADMIN — DOCUSATE SODIUM 100 MG: 100 CAPSULE, LIQUID FILLED ORAL at 09:20

## 2017-05-26 RX ADMIN — OXYCODONE HYDROCHLORIDE AND ACETAMINOPHEN 1 TABLET: 5; 325 TABLET ORAL at 19:38

## 2017-05-26 RX ADMIN — OXYCODONE HYDROCHLORIDE AND ACETAMINOPHEN 1 TABLET: 5; 325 TABLET ORAL at 09:34

## 2017-05-26 RX ADMIN — TRAZODONE HYDROCHLORIDE 100 MG: 50 TABLET ORAL at 20:52

## 2017-05-26 RX ADMIN — HEPARIN SODIUM 5000 UNITS: 5000 INJECTION, SOLUTION INTRAVENOUS; SUBCUTANEOUS at 06:31

## 2017-05-26 RX ADMIN — DIPHENHYDRAMINE HYDROCHLORIDE 25 MG: 25 CAPSULE ORAL at 20:52

## 2017-05-26 RX ADMIN — Medication 800 MG: at 09:33

## 2017-05-26 NOTE — PROGRESS NOTES
Problem: Mobility Impaired (Adult and Pediatric)  Goal: *Acute Goals and Plan of Care (Insert Text)  Physical Therapy Goals  STGs  Initiated 2017, updated 2017, and to be accomplished by d/c [17]  1. Patient will roll side to side in bed with independence. (S)  2. Patient will perform supine to sit and sit to supine with independence. (S)  3. Patient will transfer from bed to chair and chair to bed with supervision using the least restrictive device. (MET with RW)  4. Patient will perform sit to stand with supervision. (MET)  5. Patient will propel w/c on level surface for 150 ft with R UE and B LEs with supervision. (MET)    LTGs  Initiated 2017, updated 2017, and to be accomplished within 2 - 4 weeks [17]  1. Patient will roll side to side in bed with independence. 2. Patient will perform supine to sit and sit to supine in bed with independence. 3. Patient will transfer from bed to chair and chair to bed with distant supervision using the least restrictive device. 4. Patient will perform sit to stand with distant supervision. 5. Patient will ambulate with distant supervision for 150 feet with the least restrictive device. 5. Patient will ascend/descend 3 stairs with bilateral handrail(s) with supervision. PHYSICAL THERAPY DAILY NOTE  Patient Name:Stephen Sinha  Time In: 3448  Time Out: 1100  Patient Seen For: Transfer training;Gait training; Therapeutic exercise;Balance activities (stair training)  Diagnosis: Right forearm cellulits  SIRS (systemic inflammatory response syndrome) (HCC) SIRS (systemic inflammatory response syndrome) (HCC)  Precautions: fall risk     Subjective: Pt reports tendonitis in feet and ankles hurting today. Pain at start of tx: not rated   Pain at stop of tx: not rated      Patient identified with name and : yes          Objective:           TRANSFERS Daily Assessment     Transfer Type:  Other  Other: stand step txfr without AD pushing o2 tank  Transfer Assistance : 5 (Supervision/setup)  Sit to Stand Assistance: Supervision   Pt performed all sit to stand from all surfaces with S/Aries pushing up with BUE. Pt performed all sit to stands with improved time except required increased time and effort to perform sit to stand from low mat table. Pt performed stand step txfr with no AD and pushing o2 tank with S/dist S for safety. GAIT Daily Assessment     Amount of Assistance: 5 (Stand-by assistance)  Distance (ft): 80 Feet (ft) (15ftx2, 25ftx2 )  Assistive Device:  (no AD)   Pt ambulated 80ft with no AD and pushing o2 tank with SBA for safety. Pt ambulated several short household distances with no AD and pushing o2 tank with only dist S.         STEPS or STAIRS Daily Assessment     Steps/Stairs Ambulated (#): 6 (6\" steps)  Level of Assist : 5 (Stand-by assistance)  Rail Use: Both   Pt negotiated up and down 6 6\" steps with BHR and SBA with v/c for proper LE sequencing, pt independently managing o2 tubing. Pt negotiated with step to pattern. BALANCE Daily Assessment     Sitting - Static: Good (unsupported)  Sitting - Dynamic: Good (unsupported)  Standing - Static: Fair  Standing - Dynamic : Impaired   Pt participated in group activity to play wii bowling to challenge dynamic standing balance and tolerance. Pt stood x2 trials, 9min and 6min, with no AD and with S only for safety and min v/c for posture. Pt required seated rest breaks due to reports of LB pain and fatigue. LOWER EXTREMITY EXERCISES Daily Assessment      Seated BLe LAQ 3x15 for strengthening and ROM              Assessment: Pt ambulated increased distances with no AD and decreased A level, progressing toward PLOF. Plan of Care: Continue with current POC to improve independence with functional mobility and increase safety awareness.       Gali Rhodes PTA  5/26/2017

## 2017-05-26 NOTE — PROGRESS NOTES
[x] Psychology  [] Social Work [] Recreational Therapy    INTERVENTION  UNITS/TIME OF SERVICE   Assessment    Supportive Counseling May 25, 2017   Orientation    Discharge Planning    Resource Linkage              Progress/Current Status    Patient attended orthopedic support group on ARU and actively participated in discussion about her therapy effort and progress to date. She is aware of scheduled date for discharge and patients were further educated about what to expect with discharge to home, and continued recovery issues. Patient engaged well with other patients and expressed her overall satisfaction with therapy program.  She is not reporting any feelings of acute distress. Patient was encouraged in a subtle fashion to maximize her independence as able, and not rely on others for assistance when she can do for herself.     Karl Dupree, PHD 5/26/2017 8:21 AM

## 2017-05-26 NOTE — REHAB NOTE
[facility]  Bedside and Verbal shift change report given to Virginia Bell RN (oncoming nurse) by Olu Cueva LPN   (offgoing nurse). Report included the following information SBAR, Kardex, MAR and Recent Results. Situation:   Code Status: Full Code   Reason for Admission: [facility] Day: 16   Problem List:   Hospital Problems  Date Reviewed: 5/25/2017          Codes Class Noted POA    Hypomagnesemia ICD-10-CM: E83.42  ICD-9-CM: 275.2  5/22/2017 No    Overview Signed 5/22/2017  2:29 PM by Snehal Bansal MD     Mg (5/22/2017) = 1.              Acute renal failure superimposed on stage 3 chronic kidney disease (HCC) ICD-10-CM: N17.9, N18.3  ICD-9-CM: 584.9, 585.3  5/15/2017 No        Cellulitis of right forearm ICD-10-CM: L03.113  ICD-9-CM: 682.3  5/4/2017 Yes        Olecranon bursitis of right elbow ICD-10-CM: M70.21  ICD-9-CM: 726.33  5/4/2017 Yes        Contusion of left elbow ICD-10-CM: S50.02XA  ICD-9-CM: 923.11  5/4/2017 Yes        Impaired mobility and ADLs ICD-10-CM: Z74.09  ICD-9-CM: 799.89  5/4/2017 Yes        * (Principal[facility] ICD-10-CM: R65.10  ICD-9-CM: 995.90  5/2/2017 Yes        Right Achilles tendinitis ICD-10-CM: M76.61  ICD-9-CM: 726.71  5/2/2017 Yes        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 congestive heart failure (Chronic) ICD-10-CM: I13.10, N18.3  ICD-9-CM: 404.10, 585.3  Unknown Yes    Overview Signed 4/23/2013 10:02 AM by Marian Landers     Better controlled             Type 2 diabetes with stage 3 chronic kidney disease GFR 30-59 (HCC) (Chronic) ICD-10-CM: E11.22, N18.3  ICD-9-CM: 250.40, 585.3  Unknown Yes              Background:   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 congestive heart failure     Better controlled     Bipolar affective disorder ([address] 12/5/2012    Cellulitis of right forearm 5/4/2017    Chronic back pain     Chronic obstructive pulmonary disease (COPD) (HCC)     SOB, on paula O2 Recent admission with psychosis     CKD stage G3a/A1, GFR 45-59 and albumin creatinine ratio <30 mg/g 5/11/2017    Urine microalbumin/Creatinine ratio (5/11/2017) = 9 mg/g    Contusion of left elbow 5/4/2017    Depression     Diabetic neuropathy associated with type 2 diabetes mellitus (HCC)     Diastolic dysfunction without heart failure     Stable on diuretics     Falls frequently     Gastroesophageal reflux disease with hiatal hernia     Generalized osteoarthritis of multiple sites     History of acute renal failure 5/31/2013    History of back injury     jumped out of second story window     History of hepatitis C     treated    History of penicillin allergy     Hypothyroidism     Hypoxemia requiring supplemental oxygen 12/29/2014    Intravenous drug user 5/2/2017    Memory difficulty     Mixed connective tissue disease ([address] 5/10/2017    Obesity, Class I, BMI 30-34.9     Olecranon bursitis of right elbow 5/4/2017    Recurrent genital herpes 5/31/2013    Right Achilles tendinitis 5/2/2017    Sarcoidosis ([address]     Sickle cell trait ([address]     Type 2 diabetes with stage 3 chronic kidney disease GFR 30-59 ([address]     Venous insufficiency     Wears glasses       Patient taking anticoagulants yes    Patient has a defibrillator: no     Assessment:   Changes in Assessment throughout shift: no     Patient has central line: no Reasons if yes: Insertion date: Last dressing date:   Patient has Renae Cath: no Reasons if yes:     Insertion date:     Last Vitals:     Vitals:    05/24/17 2025 05/25/17 0745 05/25/17 1600 05/25/17 2049   BP: 122/78 100/67 104/70 105/73   Pulse: 87 79 99 88   Resp: 15 20 18 18   Temp: 98.6 °F (37 °C) 98.2 °F (36.8 °C) 98.8 °F (37.1 °C) 99.4 °F (37.4 °C)   SpO2: 93% 95% 93% 93%   Weight:       Height:       LMP: 11/25/2012        PAIN    Pain Assessment    Pain Intensity 1: 7 (05/26/17 0700) Pain Intensity 1: 2 (12/29/14 1105)    Pain Location 1: Generalized Pain Location 1: Abdomen    Pain Intervention(s) 1: Medication (see MAR) Pain Intervention(s) 1: Medication (see MAR)  Patient Stated Pain Goal: 0 Patient Stated Pain Goal: 0  o Intervention effective: yes   o Other actions taken for pain: repositioning     Skin Assessment  Skin color Skin Color: Appropriate for ethnicity  Condition/Temperature Skin Condition/Temp: Dry  Integrity Skin Integrity: Wound (add Wound LDA)  Turgor Turgor: Non-tenting  Weekly Pressure Ulcer Documentation  Pressure  Injury Documentation: No Pressure Injury Noted-Pressure Ulcer Prevention Initiated  Wound Prevention & Protection Methods  Orientation of wound Orientation of Wound Prevention: Posterior  Location of Prevention Location of Wound Prevention: Sacrum/Coccyx  Dressing Present Dressing Present : No  Dressing Status    Wound Offloading Wound Offloading (Prevention Methods): Bed, pressure redistribution/air     INTAKE/OUPUT    Date 05/25/17 0700 - 05/26/17 0659 05/26/17 0700 - 05/27/17 0659   Shift 4122-2898 4120-3607 24 Hour Total 1791-9731 2542-3840 24 Hour Total   I  N  T  A  K  E   P.O. 898  898         P. O. 898  898       Shift Total  (mL/kg) 898  (9.4)  898  (9.4)      O  U  T  P  U  T   Urine  (mL/kg/hr)            Urine Occurrence(s) 3 x 7 x 10 x       Stool            Stool Occurrence(s) 0 x 0 x 0 x       Shift Total  (mL/kg)           898      Weight (kg) 95.3 95.3 95.3 95.3 95.3 95.3       Recommendations:  1. Patient needs and requests: none    2. Diet: diabetic    3. Pending tests/procedures: am labs     4. Functional Level/Equipment: assist x 2/wheelchair    5.  Estimated Discharge Date: tbd Posted on Whiteboard in [facility]: no     [facility] Safety Check    A safety check occurred in the patient's room between off going nurse and oncoming nurse listed above. The safety check included the below items  Area Items   H  High Alert Medications - Verify all high alert medication drips (heparin, PCA, etc.)   E  Equipment - Suction is set up for ALL patients (with lashay)  - Red plugs utilized for all equipment (IV pumps, etc.)  - WOWs wiped down at end of shift.  - Room stocked with oxygen, suction, and other unit-specific supplies   A  Alarms - Bed alarm is set for fall risk patients  - Ensure chair alarm is in place and activated if patient is up in a chair   L  Lines - Check IV for any infiltration  - Renae bag is empty if patient has a Renae   - Tubing and IV bags are labeled   S  Safety   - Room is clean, patient is clean, and equipment is clean. - Hallways are clear from equipment besides carts. - Fall bracelet on for fall risk patients  - Ensure room is clear and free of clutter  - Suction is set up for ALL patients (with lashay)  - Hallways are clear from equipment besides carts.    - Isolation precautions followed, supplies available outside room, sign posted   Julien Saleem LPN

## 2017-05-26 NOTE — REHAB NOTE
[facility]  Bedside and Verbal shift change report given to Laurence Newton LPN (oncoming nurse) by Elian Quintero RN   (offgoing nurse). Report included the following information SBAR, Kardex, MAR and Recent Results. Situation:   Code Status: Full Code   Reason for Admission: [facility] Day: 16   Problem List:   Hospital Problems  Date Reviewed: 5/25/2017          Codes Class Noted POA    Hypomagnesemia ICD-10-CM: E83.42  ICD-9-CM: 275.2  5/22/2017 No    Overview Signed 5/22/2017  2:29 PM by Brit Stanford MD     Mg (5/22/2017) = 1.              Acute renal failure superimposed on stage 3 chronic kidney disease (HCC) ICD-10-CM: N17.9, N18.3  ICD-9-CM: 584.9, 585.3  5/15/2017 No        Cellulitis of right forearm ICD-10-CM: L03.113  ICD-9-CM: 682.3  5/4/2017 Yes        Olecranon bursitis of right elbow ICD-10-CM: M70.21  ICD-9-CM: 726.33  5/4/2017 Yes        Contusion of left elbow ICD-10-CM: S50.02XA  ICD-9-CM: 923.11  5/4/2017 Yes        Impaired mobility and ADLs ICD-10-CM: Z74.09  ICD-9-CM: 799.89  5/4/2017 Yes        * (Principal)SIRS (systemic inflammatory response syndrome) ([facility] [address] ICD-10-CM: R65.10  ICD-9-CM: 995.90  5/2/2017 Yes        Right Achilles tendinitis ICD-10-CM: M76.61  ICD-9-CM: 726.71  5/2/2017 Yes        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 congestive heart failure (Chronic) ICD-10-CM: I13.10, N18.3  ICD-9-CM: 404.10, 585.3  Unknown Yes    Overview Signed 4/23/2013 10:02 AM by Isabelle Daniel     Better controlled             Type 2 diabetes with stage 3 chronic kidney disease GFR 30-59 (HCC) (Chronic) ICD-10-CM: E11.22, N18.3  ICD-9-CM: 250.40, 585.3  Unknown Yes              Background:   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 congestive heart failure     Better controlled     Bipolar affective disorder ([address] 12/5/2012    Cellulitis of right forearm 5/4/2017    Chronic back pain     Chronic obstructive pulmonary disease (COPD) (HCC)     SOB, on paula O2 Recent admission with psychosis     CKD stage G3a/A1, GFR 45-59 and albumin creatinine ratio <30 mg/g 5/11/2017    Urine microalbumin/Creatinine ratio (5/11/2017) = 9 mg/g    Contusion of left elbow 5/4/2017    Depression     Diabetic neuropathy associated with type 2 diabetes mellitus (HCC)     Diastolic dysfunction without heart failure     Stable on diuretics     Falls frequently     Gastroesophageal reflux disease with hiatal hernia     Generalized osteoarthritis of multiple sites     History of acute renal failure 5/31/2013    History of back injury     jumped out of second story window     History of hepatitis C     treated    History of penicillin allergy     Hypothyroidism     Hypoxemia requiring supplemental oxygen 12/29/2014    Intravenous drug user 5/2/2017    Memory difficulty     Mixed connective tissue disease ([address] 5/10/2017    Obesity, Class I, BMI 30-34.9     Olecranon bursitis of right elbow 5/4/2017    Recurrent genital herpes 5/31/2013    Right Achilles tendinitis 5/2/2017    Sarcoidosis ([address]     Sickle cell trait ([address]     Type 2 diabetes with stage 3 chronic kidney disease GFR 30-59 ([address]     Venous insufficiency     Wears glasses       Patient taking anticoagulants yes    Patient has a defibrillator: no     Assessment:   Changes in Assessment throughout shift: no     Patient has central line: no Reasons if yes: Insertion date: Last dressing date:   Patient has Renae Cath: no Reasons if yes:     Insertion date:     Last Vitals:     Vitals:    05/24/17 2025 05/25/17 0745 05/25/17 1600 05/25/17 2049   BP: 122/78 100/67 104/70 105/73 Pulse: 87 79 99 88   Resp: 15 20 18 18   Temp: 98.6 °F (37 °C) 98.2 °F (36.8 °C) 98.8 °F (37.1 °C) 99.4 °F (37.4 °C)   SpO2: 93% 95% 93% 93%   Weight:       Height:       LMP: 11/25/2012        PAIN    Pain Assessment    Pain Intensity 1: 7 (05/26/17 0000) Pain Intensity 1: 2 (12/29/14 1105)    Pain Location 1: Generalized Pain Location 1: Abdomen    Pain Intervention(s) 1: Medication (see MAR) Pain Intervention(s) 1: Medication (see MAR)  Patient Stated Pain Goal: 0 Patient Stated Pain Goal: 0  o Intervention effective: yes   o Other actions taken for pain: repositioning     Skin Assessment  Skin color Skin Color: Appropriate for ethnicity  Condition/Temperature Skin Condition/Temp: Dry  Integrity Skin Integrity: Wound (add Wound LDA)  Turgor Turgor: Non-tenting  Weekly Pressure Ulcer Documentation  Pressure  Injury Documentation: No Pressure Injury Noted-Pressure Ulcer Prevention Initiated  Wound Prevention & Protection Methods  Orientation of wound Orientation of Wound Prevention: Posterior  Location of Prevention Location of Wound Prevention: Sacrum/Coccyx  Dressing Present Dressing Present : No  Dressing Status    Wound Offloading Wound Offloading (Prevention Methods): Bed, pressure redistribution/air     INTAKE/OUPUT    Date 05/25/17 0700 - 05/26/17 0659 05/26/17 0700 - 05/27/17 0659   Shift 1241-3885 5737-8074 24 Hour Total 9135-2218 0907-0201 24 Hour Total   I  N  T  A  K  E   P.O. 898  898         P. O. 898  898       Shift Total  (mL/kg) 898  (9.4)  898  (9.4)      O  U  T  P  U  T   Urine  (mL/kg/hr)            Urine Occurrence(s) 3 x 6 x 9 x       Stool            Stool Occurrence(s) 0 x 0 x 0 x       Shift Total  (mL/kg)           898      Weight (kg) 95.3 95.3 95.3 95.3 95.3 95.3       Recommendations:  1. Patient needs and requests: none    2. Diet: diabetic    3. Pending tests/procedures: am labs     4. Functional Level/Equipment: assist x 2/wheelchair    5.  Estimated Discharge Date: tbd Posted on Whiteboard in Patients Room: no     [facility]    A safety check occurred in the patient's room between off going nurse and oncoming nurse listed above. The safety check included the below items  Area Items   H  High Alert Medications - Verify all high alert medication drips (heparin, PCA, etc.)   E  Equipment - Suction is set up for ALL patients (with lashay)  - Red plugs utilized for all equipment (IV pumps, etc.)  - WOWs wiped down at end of shift.  - Room stocked with oxygen, suction, and other unit-specific supplies   A  Alarms - Bed alarm is set for fall risk patients  - Ensure chair alarm is in place and activated if patient is up in a chair   L  Lines - Check IV for any infiltration  - Renae bag is empty if patient has a Renae   - Tubing and IV bags are labeled   S  Safety   - Room is clean, patient is clean, and equipment is clean. - Hallways are clear from equipment besides carts. - Fall bracelet on for fall risk patients  - Ensure room is clear and free of clutter  - Suction is set up for ALL patients (with lashay)  - Hallways are clear from equipment besides carts.    - Isolation precautions followed, supplies available outside room, sign posted   Cristel Urbina, RN

## 2017-05-26 NOTE — PROGRESS NOTES
Problem: Self Care Deficits Care Plan (Adult)  Goal: *Acute Goals and Plan of Care (Insert Text)  Long Term Goals (to be met upon discharge date) in order to increase pts functional independence and safety, and decrease burden of care:  1. Pt will perform self-feeding with setup. 2. Pt will perform grooming with setup  3. Pt will perform UB bathing with Min A.  4. Pt will perform LB bathing with Min A.  5. Pt will perform tub/shower transfer with Min A.   6. Pt will perform UB dressing with Min A.  7. Pt will perform LB dressing with Min A.  8. Pt will perform toileting task with Min A.  9. Pt will perform toilet transfer with Min A. Short Term Weekly Goals for (2017 to 2017) in order to increase pts functional independence and safety, and decrease burden of care:  1. Pt will perform self-feeding with S.   2. Pt will perform grooming with S.  3. Pt will perform UB bathing with S.  4. Pt will perform LB bathing with S.  5. Pt will perform tub/shower transfer with S.  6. Pt will perform UB dressing with S.  7. Pt will perform LB dressing with min A.  8. Pt will perform toileting task with S.  9. Pt will perform toilet transfer with S.   OCCUPATIONAL THERAPY DAILY NOTE  Patient Name:Stephen Sinha  Time Spent With Patient  Time In: 1330  Time Out: 1505     Medical Diagnosis:  Right forearm cellulits  SIRS (systemic inflammatory response syndrome) (HCC) SIRS (systemic inflammatory response syndrome) (HCC)      Pain at start of tx: Not rated but pt does report increased pain in all joints  Pain at stop of tx: Not rated but pt does report increased pain in all joints     Patient identified with name and : yes  Subjective: \"Get that for me. \" \"Pull it up in the back. \" \"I can't. \"      Objective:      THERAPEUTIC ACTIVITY Daily Assessment     Pt participated in a 30-minute group tx session focusing on static standing tolerance and endurance to carryover to ADLs and functional mobility.  Pt stood for 3 trials (~8 minutes, ~4 minutes, and ~6 minutes) with supervision and verbal cueing needed to step all the way back to the w/c prior to sitting down. THERAPEUTIC EXERCISE Daily Assessment     Pt performed x10 reps pinch with each digit using red theraputty for hand strengthening. IADL Daily Assessment     Pt performed functional mobility in therapy gym without an AD to work on picking up items from various surfaces and heights to simulate obtaining items throughout her home environment. Pt utilized reacher to obtain items from the floor and out of reach. She needed minimal verbal and visual cues for O2 line management. Pt also participated in functional task of opening refrigerator to obtain a bottle of water with supervision for safety while reaching in. Pt also ambulated in the kitchen to boil some water on the stove as she states she sometimes will boil eggs or a hot dog for simple meal prep. She was able to open cabinets to obtain small pot from cabinet and carry it to fill with water and place on the stove. GROOMING Daily Assessment     Grooming  Grooming Assistance : 4 (Minimal assistance)  Comments: Pt performed hand hygiene while standing at the sink with Min A needed assistance for obtaining soap from soap dispenser due to decreased hand strength       TOILETING Daily Assessment     Toileting  Toileting Assistance (FIM Score): 4 (Minimal assistance) (for pulling pants up all the way)  Cues: Physical assistance for pants up       MOBILITY/TRANSFERS Daily Assessment     Functional Transfers  Toilet Transfer :  (Stand step transfer without AD)  Amount of Assistance Required: 5 (Supervision/setup)   Pt performs stand step transfers to/from [facility] over toilet without an AD with supervision and verbal cues for O2 line management.       Assessment: Pt continues to demo self-limiting behaviors when it comes to performing ADLs, however she is progressing with improved balance and activity tolerance to participating in household IADL tasks. Plan of Care: Continue POC to maximize pt independence and safety performing ADLs and functional transfers/mobility.      Deloris Hutson, OTR  5/26/2017

## 2017-05-26 NOTE — PROGRESS NOTES
[facility] PHYSICAL REHABILITATION  [address] [place] [address], [address]     INPATIENT REHABILITATION  DAILY PROGRESS NOTE     Date: 5/26/2017    Name: Sandi Amos Age / Sex: 62 y.o. / female   CSN: [id number] MRN: [id number]   [facility] Date: 5/10/2017 Length of Stay: 16 days     Primary Rehab Diagnosis: Impaired Mobility and ADLs secondary to Systemic inflammatory response syndrome (SIRS) secondary to:  1. Cellulitis of the right forearm  2. Olecranon bursitis of the right elbow  3. Contusion of left elbow  4. Right Achilles tendonitis      Subjective:     Patient seen and examined. Blood pressure controlled but on the low side of normal. Blood sugars fairly controlled. Patient's Complaint:   No significant medical complaints     Pain Control: ongoing significant pain in which is stable and controlled by current meds      Objective:     Vital Signs:  Patient Vitals for the past 24 hrs:   BP Temp Pulse Resp SpO2   05/26/17 0835 95/65 98.2 °F (36.8 °C) 79 18 93 %   05/25/17 2049 105/73 99.4 °F (37.4 °C) 88 18 93 %   05/25/17 1600 104/70 98.8 °F (37.1 °C) 99 18 93 %        Physical Examination:  GENERAL SURVEY: Patient is awake, alert, oriented x 3, sitting comfortably on the chair, not in acute respiratory distress.   HEENT: pink palpebral conjunctivae, anicteric sclerae, no nasoaural discharge, moist oral mucosa  NECK: supple, no jugular venous distention, no palpable lymph nodes  CHEST/LUNGS: symmetrical chest expansion, good air entry, clear breath sounds  HEART: adynamic precordium, good S1 S2, no S3, regular rhythm, no murmurs  ABDOMEN: obese, bowel sounds appreciated, soft, non-tender  EXTREMITIES: (+) left hand wrapped in gauze, pink nailbeds, (+) decreased warmth/erythema/swelling of both elbows, (+) bipedal edema, full and equal pulses, no calf tenderness   NEUROLOGICAL EXAM: The patient is awake, alert and oriented x3, able to answer questions fairly appropriately, able to follow 1 and 2 step commands. Able to tell time from the wall clock. Cranial nerves II-XII are grossly intact. No gross sensory deficit. Motor strength is 2+/5 on both shoulders, 3/5 on the right elbow, 3-/5 on the left elbow, 2+/5 on both wrists, 3+/5 on the right handgrip, 3/5 on the left handgrip, 3-/5 on the right hip, 3/5 on the left hip, 3-/5 on both knees and both ankles.       Current Medications:  Current Facility-Administered Medications   Medication Dose Route Frequency    insulin glargine (LANTUS) injection 60 Units  60 Units SubCUTAneous ACB    senna-docusate (PERICOLACE) 8.6-50 mg per tablet 2 Tab  2 Tab Oral PCD    docusate sodium (COLACE) capsule 100 mg  100 mg Oral DAILY AFTER BREAKFAST    insulin glargine (LANTUS) injection 25 Units  25 Units SubCUTAneous QHS    magnesium oxide (MAG-OX) tablet 800 mg  800 mg Oral BID    oxyCODONE-acetaminophen (PERCOCET) 5-325 mg per tablet 1 Tab  1 Tab Oral TID    oxyCODONE-acetaminophen (PERCOCET) 5-325 mg per tablet 1 Tab  1 Tab Oral Q4H PRN    tolterodine (DETROL) tablet 1 mg  1 mg Oral BID    famotidine (PEPCID) tablet 20 mg  20 mg Oral DAILY    gabapentin (NEURONTIN) capsule 300 mg  300 mg Oral Q12H    diphenhydrAMINE (BENADRYL) capsule 25 mg  25 mg Oral QHS    cholecalciferol (VITAMIN D3) tablet 1,000 Units  1,000 Units Oral DAILY    acetaminophen (TYLENOL) tablet 650 mg  650 mg Oral Q4H PRN    bisacodyl (DULCOLAX) tablet 10 mg  10 mg Oral Q48H PRN    heparin (porcine) injection 5,000 Units  5,000 Units SubCUTAneous Q8H    insulin lispro (HUMALOG) injection   SubCUTAneous TIDAC    albuterol (PROVENTIL VENTOLIN) nebulizer solution 2.5 mg  2.5 mg Nebulization Q4H PRN    divalproex DR (DEPAKOTE) tablet 250 mg  250 mg Oral QHS    ARIPiprazole (ABILIFY) tablet 10 mg  10 mg Oral DAILY    traZODone (DESYREL) tablet 100 mg  100 mg Oral QHS    rosuvastatin (CRESTOR) tablet 5 mg  5 mg Oral QHS    aspirin chewable tablet 81 mg  81 mg Oral DAILY WITH BREAKFAST    levothyroxine (SYNTHROID) tablet 100 mcg  100 mcg Oral ACB    sertraline (ZOLOFT) tablet 50 mg  50 mg Oral DAILY    umeclidinium-vilanterol (ANORO ELLIPTA) 62.5 mcg- 25 mcg/inhalation  1 Puff Inhalation QAM RT    B complex-vitaminC-folic acid (NEPHROCAP) cap  1 Cap Oral DAILY       Allergies: Allergies   Allergen Reactions    Moxifloxacin Rash    Pcn [Penicillins] Swelling    Sulfa (Sulfonamide Antibiotics) Swelling       Lab/Data Review:  Recent Results (from the past 24 hour(s))   GLUCOSE, POC    Collection Time: 05/25/17 12:00 PM   Result Value Ref Range    Glucose (POC) 172 (H) 70 - 110 mg/dL   GLUCOSE, POC    Collection Time: 05/25/17  5:14 PM   Result Value Ref Range    Glucose (POC) 204 (H) 70 - 110 mg/dL   GLUCOSE, POC    Collection Time: 05/25/17  7:45 PM   Result Value Ref Range    Glucose (POC) 225 (H) 70 - 110 mg/dL   URIC ACID    Collection Time: 05/25/17  9:40 PM   Result Value Ref Range    Uric acid 7.9 (H) 2.6 - 7.2 MG/DL   URINALYSIS W/MICROSCOPIC    Collection Time: 05/26/17  3:00 AM   Result Value Ref Range    Color YELLOW      Appearance CLEAR      Specific gravity 1.010 1.005 - 1.030      pH (UA) 5.0 5.0 - 8.0      Protein NEGATIVE  NEG mg/dL    Glucose NEGATIVE  NEG mg/dL    Ketone NEGATIVE  NEG mg/dL    Bilirubin NEGATIVE  NEG      Blood NEGATIVE  NEG      Urobilinogen 0.2 0.2 - 1.0 EU/dL    Nitrites NEGATIVE  NEG      Leukocyte Esterase LARGE (A) NEG      WBC TOO NUMEROUS TO COUNT 0 - 4 /hpf    RBC 1 to 10 0 - 5 /hpf    Epithelial cells 1+ 0 - 5 /lpf    Bacteria 2+ (A) NEG /hpf   GLUCOSE, POC    Collection Time: 05/26/17  7:51 AM   Result Value Ref Range    Glucose (POC) 174 (H) 70 - 110 mg/dL   GLUCOSE, POC    Collection Time: 05/26/17 11:06 AM   Result Value Ref Range    Glucose (POC) 240 (H) 70 - 110 mg/dL       Estimated Glomerular Filtration Rate:  CKD-EPI:   On admission, estimated GFR was 53.6 mL/min/1.73m2 based on a Creatinine of 1.26 mg/dl.    Most recent estimated GFR was 39.6 mL/min/1.73m2 based on a Creatinine of 1.62 mg/dl. MDRD:   On admission, estimated GFR was 55.7 mL/min/1.73m2 based on a Creatinine of 1.26 mg/dl. Most recent estimated GFR was 41.7 mL/min/1.73m2 based on a Creatinine of 1.62 mg/dl. Assessment:     Primary Rehabilitation Diagnosis  1. Impaired Mobility and ADLs  2. Systemic inflammatory response syndrome (SIRS) secondary to:   a. Cellulitis of the right forearm   b. Olecranon bursitis of the right elbow   c. Contusion of left elbow   d.  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 heart failure    Depression    History of back injury    Anxiety    Wears glasses    History of hepatitis C    Sickle cell trait (HCC)    History of acute renal failure    Hypothyroidism    Recurrent genital herpes    Sarcoidosis ([facility] [address]    History pf abscess of right arm    Hypoxemia requiring supplemental oxygen    Abnormal nuclear stress test    History of cellulitis and abscess of hand    History of cellulitis and abscess of foot    Intravenous drug user    History of penicillin allergy    Falls frequently    Gastroesophageal reflux disease with hiatal hernia    Memory difficulty    Mixed connective tissue disease     CKD stage G3a/A1, GFR 45-59 and albumin creatinine ratio <30 mg/g     Acute renal failure superimposed on stage 3 chronic kidney disease    Hypomagnesemia    Acute cystitis without hematuria       Plan:     1. Justification for continued stay: Good progression towards established rehabilitation goals. 2. Medical Issues being followed closely:    [x]  Fall and safety precautions     [x]  Wound Care     [x]  Bowel and Bladder Function     [x]  Fluid Electrolyte and Nutrition Balance     [x]  Pain Control      3. Issues that 24 hour rehabilitation nursing is following:    [x]  Fall and safety precautions     [x]  Wound Care     [x]  Bowel and Bladder Function     [x]  Fluid Electrolyte and Nutrition Balance     [x]  Pain Control      [x]  Assistance with and education on in-room safety with transfers to and from the bed, wheelchair, toilet and shower. 4. Acute rehabilitation plan of care:    [x]  Continue current care and rehab. [x]  Physical Therapy           [x]  Occupational Therapy           []  Speech Therapy     []  Hold Rehab until further notice     5. Medications:    [x]  MAR Reviewed     [x]  Continue Present Medications     6. DVT Prophylaxis:      []  Lovenox     [x]  Unfractionated Heparin     []  Coumadin     []  NOAC     [x]  ROBERT Stockings     []  Sequential Compression Device     []  None     7.  Orders:   > Systemic inflammatory response syndrome (SIRS) secondary to Cellulitis of the right forearm, Olecranon bursitis of the right elbow, Contusion of left elbow and Right Achilles tendonitis    > Patient had completed the recommended treatment course of oral Clindamycin on 5/13/2017     > Abnormally low HDL with hypertriglyceridemia   > Continue Rosuvastatin 5 mg PO q HS     > 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 congestive heart failure    > On 5/11/2017, discontinued Furosemide 20 mg PO once daily (re: rising Creatinine)     > Bipolar affective disease   > Continue:    > Aripiprazole 10 mg PO once daily    > Divalproex  mg PO q HS     > Chronic obstructive pulmonary disease   > Continue:    > Anoro Ellipta 1 puff inhaled once daily    > Albuterol nebulization q 4 hr PRN for shortness of breath     > Depression / Anxiety   > Continue:    > Aripiprazole 10 mg PO once daily    > Sertraline 50 mg PO once daily    > Trazodone 100 mg PO q HS     > Diabetic neuropathy   > On 5/15/2017, decreased Gabapentin from 300 mg PO q 8 hr to 300 mg PO q 12 hr (re: decrease in CrCl)   > Continue Gabapentin 300 mg PO q 12 hr     > Diastolic dysfunction without heart failure   > On 5/11/2017, discontinued Furosemide 20 mg PO once daily (re: rising Creatinine)   > On 5/14/2017, resumed Eplerenone 25 mg PO once daily    > On 5/15/2017, discontinued Eplerenone 25 mg PO once daily (re: decrease in CrCl)     > Gastroesophageal reflux disease with hiatal hernia   > On 5/15/2017, decreased Famotidine from 20 mg PO BID to 20 mg PO once daily   > Continue Famotidine 20 mg PO once daily     > Hypothyroidism   > Continue Levothyroxine 100 mcg PO once daily before breakfast     > Hypoxemia requiring supplemental oxygen   > Continue O2 inhalation 2 LPM via nasal cannula continuous     > Type 2 diabetes mellitus with diabetic neuropathy and stage 3 chronic kidney disease    > Prior to admission to [facility], the patient claimed she wa snot using Lantus at home; instead, she was using Insulin U500 on a sliding scale basis   > On 5/18/2017, changed Lantus from 65 units SC q HS to 40 units SC q AM and 20 units SC q PM   > On 5/19/2017:    > Increased Lantus from 40 units to 44 units SC q AM    > Increased Lantus from 20 units to 22 units SC q PM   > On 5/22/2017:    > Increased Lantus from 44 units to 50 units SC q AM    > Increased Lantus from 22 units to 25 units SC q PM   > On 5/24/2017, increased Lantus from 50 units to 55 units SC q AM   > On 5/25/2017, increased Lantus from 55 units to 60 units SC q AM   > Continue:    > Increase Lantus from 60 units to 65 units SC q AM    > Lantus 25 units SC q PM    > Humalog insulin sliding scale SC TID AC only     > Hypomagnesemia   > Mg (5/11/2017, on admission) = 1.6   > Patient was started on Mg(OH)2 400 mg PO BID   > Mg (5/22/2017) = 1.5   > On 5/22/2017, increased Mg(OH)2 from 400 mg to 800 mg PO BID   > Mg (5/25/2017) = 1.7   > Continue Mg(OH)2 800 mg PO BID    > CKD stage G3a/A1, GFR 45-59 and albumin creatinine ratio <30 mg/g     > On admission, based on a Creatinine of 1.26 mg/dl:    Estimated GFR using CKD-EPI = 53.6 mL/min/1.73m2    Estimated GFR using MDRD = 55.7 mL/min/1.73m2    > Urine microalbumin/Creatinine ratio (5/11/2017) = 9 mg/g    > Acute renal failure superimposed on stage 3 chronic kidney disease   > BUN/Creatinine (5/10/2017) = 19/1.33    > BUN/Creatinine (5/11/2017, on admission) = 16/1.26    > On 5/12/2017:    > Discontinued Diclofenac 2 grams applied to affected areas 4 times daily     > Started Naproxen 375 mg PO BID with meals   > On 5/14/2017, resumed Eplerenone 25 mg PO once daily    > BUN/Creatinine (5/15/2017) = 22/1.87    > Urinalysis (5/15/2017) showed SG 1.014, no casts   > On 5/15/2017:    > Discontinued Naproxen 375 mg PO BID with meals    > Discontinued Eplerenone 25 mg PO once daily     > Decreased Gabapentin from 300 mg PO q 8 hr to 300 mg PO q 12 hr     > Decreased Famotidine from 20 mg PO BID to 20 mg PO once daily    > Patient was given IVF: 0.9%  ml/hr x 1 liter   > On 5/16/2017, patient was given IVF: 0.9%  ml/hr x 1 liter   > On 5/17/2017, patient was given IVF: 0.9%  ml/hr x 1 liter   > On 5/18/2017, patient was given IVF: 0.9%  ml/hr x 1 liter   > BUN/Creatinine (5/20/2017) = 16/1.58    > BUN/Creatinine (5/21/2017) = 17/1.58   > BUN/Creatinine (5/22/2017) = 19/1.62   > On 5/22/2017, patient was given IVF: 0.9%  ml/hr x 1 liter   > On 5/23/2017, patient was given IVF: 0.9%  ml/hr x 1 liter   > BUN/Creatinine (5/25/2017) = 20/1.51    > On 5/25/2017, Dr. Dilshad Dinero ordered Furosemide 40 mg IV x 2 doses   > Increase oral fluid intake    > Difficulty sleeping    > Continue:    > Trazodone 100 mg PO q HS    > Diphenhydramine HCl 25 mg PO q HS    > Urinary urge incontinence   > On 5/15/2017, added Diphenhydramine HCl 25 mg PO q HS (re: ADR is urinary retention)   > On 5/17/2017, added Tolterodine 1 mg PO BID   > Continue:    > Tolterodine 1 mg PO BID    > Diphenhydramine HCl 25 mg PO q HS    > Constipation   > On 5/25/2017:    > Decreased Docusate sodium to 100 mg PO once daily after breakfast    > added Pericolace 2 tabs PO once daily after dinner   > Continue:    > Docusate sodium 100 mg PO once daily after breakfast    > Mg(OH)2 800 mg PO BID    > Pericolace 2 tabs PO once daily after dinner    > Acute cystitis without hematuria   > (+) pain after urination reported 5/25/2017   > No documented fever   > Urinalysis (5/26/2017) showed clear urine, nitrites negative, leukocyte esterase large, WBC TNTC, RBC 1-10   > Urine culture   > Ceftriaxone 1 gram IV q 24 hr x 5 days    > Analgesia   > On 5/12/2017:    > Discontinued Diclofenac 2 grams applied to affected areas 4 times daily     > Started Naproxen 375 mg PO BID with meals   > On 5/15/2017, discontinued Naproxen 375 mg PO BID with meals (re: decrease in CrCl)   > On 5/17/2017:    > Discontinued Norco 5/325 1 tab PO q 4 hr PRN for pain level greater than 4/10    > Added:     > Percocet 5/325 1 tab PO TID (8AM, 12PM, 4PM)     > Percocet 5/325 1 tab PO q 4 hr PRN for pain level greater than 4/10 (from 8PM to 4AM only)   > On 5/25/2017, Dr. Dacia Jain ordered Ketorolac 15 mg IV q 6 hr x 4 doses   > Discontinue Ketorolac after 2nd dose (re: patient said the pain is not that bad and there was no difference after getting the 2 doses plus she is worried that her Creatinine would bump up)   > Continue:    > Acetaminophen 650 mg PO q 4 hr PRN for pain level less than 5/10    > Percocet 5/325 1 tab PO TID (8AM, 12PM, 4PM)    > Percocet 5/325 1 tab PO q 4 hr PRN for pain level greater than 4/10 (from 8PM to 4AM only)        8. Patient's progress in rehabilitation and medical issues discussed with the patient. All questions answered to the best of my ability. Care plan discussed with patient and nurse.       Alyson Salgado MD    May 26, 2017

## 2017-05-27 LAB
ANION GAP BLD CALC-SCNC: 9 MMOL/L (ref 3–18)
BASOPHILS # BLD AUTO: 0 K/UL (ref 0–0.1)
BASOPHILS # BLD: 0 % (ref 0–2)
BUN SERPL-MCNC: 27 MG/DL (ref 7–18)
BUN/CREAT SERPL: 14 (ref 12–20)
CALCIUM SERPL-MCNC: 10.2 MG/DL (ref 8.5–10.1)
CHLORIDE SERPL-SCNC: 95 MMOL/L (ref 100–108)
CO2 SERPL-SCNC: 34 MMOL/L (ref 21–32)
CREAT SERPL-MCNC: 1.91 MG/DL (ref 0.6–1.3)
DIFFERENTIAL METHOD BLD: ABNORMAL
EOSINOPHIL # BLD: 0.4 K/UL (ref 0–0.4)
EOSINOPHIL NFR BLD: 8 % (ref 0–5)
ERYTHROCYTE [DISTWIDTH] IN BLOOD BY AUTOMATED COUNT: 15 % (ref 11.6–14.5)
GLUCOSE BLD STRIP.AUTO-MCNC: 127 MG/DL (ref 70–110)
GLUCOSE BLD STRIP.AUTO-MCNC: 141 MG/DL (ref 70–110)
GLUCOSE BLD STRIP.AUTO-MCNC: 172 MG/DL (ref 70–110)
GLUCOSE BLD STRIP.AUTO-MCNC: 199 MG/DL (ref 70–110)
GLUCOSE BLD STRIP.AUTO-MCNC: 249 MG/DL (ref 70–110)
GLUCOSE SERPL-MCNC: 128 MG/DL (ref 74–99)
HCT VFR BLD AUTO: 32.2 % (ref 35–45)
HGB BLD-MCNC: 10.4 G/DL (ref 12–16)
LYMPHOCYTES # BLD AUTO: 35 % (ref 21–52)
LYMPHOCYTES # BLD: 1.9 K/UL (ref 0.9–3.6)
MCH RBC QN AUTO: 26.4 PG (ref 24–34)
MCHC RBC AUTO-ENTMCNC: 32.3 G/DL (ref 31–37)
MCV RBC AUTO: 81.7 FL (ref 74–97)
MONOCYTES # BLD: 0.4 K/UL (ref 0.05–1.2)
MONOCYTES NFR BLD AUTO: 8 % (ref 3–10)
NEUTS SEG # BLD: 2.6 K/UL (ref 1.8–8)
NEUTS SEG NFR BLD AUTO: 49 % (ref 40–73)
PLATELET # BLD AUTO: 142 K/UL (ref 135–420)
PMV BLD AUTO: 9.7 FL (ref 9.2–11.8)
POTASSIUM SERPL-SCNC: 4.4 MMOL/L (ref 3.5–5.5)
RBC # BLD AUTO: 3.94 M/UL (ref 4.2–5.3)
SODIUM SERPL-SCNC: 138 MMOL/L (ref 136–145)
WBC # BLD AUTO: 5.4 K/UL (ref 4.6–13.2)

## 2017-05-27 PROCEDURE — 80048 BASIC METABOLIC PNL TOTAL CA: CPT | Performed by: INTERNAL MEDICINE

## 2017-05-27 PROCEDURE — 82962 GLUCOSE BLOOD TEST: CPT

## 2017-05-27 PROCEDURE — 74011250636 HC RX REV CODE- 250/636: Performed by: INTERNAL MEDICINE

## 2017-05-27 PROCEDURE — 65310000000 HC RM PRIVATE REHAB

## 2017-05-27 PROCEDURE — 74011000258 HC RX REV CODE- 258: Performed by: INTERNAL MEDICINE

## 2017-05-27 PROCEDURE — 97535 SELF CARE MNGMENT TRAINING: CPT

## 2017-05-27 PROCEDURE — 74011250637 HC RX REV CODE- 250/637: Performed by: INTERNAL MEDICINE

## 2017-05-27 PROCEDURE — 74011250637 HC RX REV CODE- 250/637: Performed by: SPECIALIST

## 2017-05-27 PROCEDURE — 74011636637 HC RX REV CODE- 636/637: Performed by: INTERNAL MEDICINE

## 2017-05-27 PROCEDURE — 36415 COLL VENOUS BLD VENIPUNCTURE: CPT | Performed by: INTERNAL MEDICINE

## 2017-05-27 PROCEDURE — 85025 COMPLETE CBC W/AUTO DIFF WBC: CPT | Performed by: INTERNAL MEDICINE

## 2017-05-27 RX ORDER — SODIUM CHLORIDE 9 MG/ML
1000 INJECTION, SOLUTION INTRAVENOUS ONCE
Status: COMPLETED | OUTPATIENT
Start: 2017-05-27 | End: 2017-05-27

## 2017-05-27 RX ORDER — DOCUSATE SODIUM 100 MG/1
100 CAPSULE, LIQUID FILLED ORAL DAILY
Status: DISCONTINUED | OUTPATIENT
Start: 2017-05-28 | End: 2017-05-31 | Stop reason: HOSPADM

## 2017-05-27 RX ORDER — ALLOPURINOL 100 MG/1
100 TABLET ORAL DAILY
Status: DISCONTINUED | OUTPATIENT
Start: 2017-05-28 | End: 2017-05-27

## 2017-05-27 RX ORDER — DICLOFENAC SODIUM 10 MG/G
2 GEL TOPICAL 4 TIMES DAILY
Status: DISCONTINUED | OUTPATIENT
Start: 2017-05-27 | End: 2017-05-31 | Stop reason: HOSPADM

## 2017-05-27 RX ORDER — AMOXICILLIN 250 MG
2 CAPSULE ORAL DAILY
Status: DISCONTINUED | OUTPATIENT
Start: 2017-05-28 | End: 2017-05-30

## 2017-05-27 RX ORDER — FEBUXOSTAT 40 MG/1
40 TABLET, FILM COATED ORAL DAILY
Status: DISCONTINUED | OUTPATIENT
Start: 2017-05-28 | End: 2017-05-31 | Stop reason: HOSPADM

## 2017-05-27 RX ADMIN — ROSUVASTATIN CALCIUM 5 MG: 5 TABLET ORAL at 21:52

## 2017-05-27 RX ADMIN — TOLTERODINE TARTRATE 1 MG: 1 TABLET, FILM COATED ORAL at 18:01

## 2017-05-27 RX ADMIN — CEFTRIAXONE 1 G: 1 INJECTION, POWDER, FOR SOLUTION INTRAMUSCULAR; INTRAVENOUS at 18:43

## 2017-05-27 RX ADMIN — GABAPENTIN 300 MG: 300 CAPSULE ORAL at 18:00

## 2017-05-27 RX ADMIN — ARIPIPRAZOLE 10 MG: 10 TABLET ORAL at 09:22

## 2017-05-27 RX ADMIN — INSULIN LISPRO 4 UNITS: 100 INJECTION, SOLUTION INTRAVENOUS; SUBCUTANEOUS at 12:35

## 2017-05-27 RX ADMIN — OXYCODONE HYDROCHLORIDE AND ACETAMINOPHEN 1 TABLET: 5; 325 TABLET ORAL at 18:01

## 2017-05-27 RX ADMIN — DICLOFENAC SODIUM 2 G: 10 GEL TOPICAL at 18:00

## 2017-05-27 RX ADMIN — SODIUM CHLORIDE 1000 ML: 900 INJECTION, SOLUTION INTRAVENOUS at 14:09

## 2017-05-27 RX ADMIN — INSULIN GLARGINE 25 UNITS: 100 INJECTION, SOLUTION SUBCUTANEOUS at 18:02

## 2017-05-27 RX ADMIN — DIPHENHYDRAMINE HYDROCHLORIDE 25 MG: 25 CAPSULE ORAL at 21:52

## 2017-05-27 RX ADMIN — INSULIN GLARGINE 65 UNITS: 100 INJECTION, SOLUTION SUBCUTANEOUS at 06:25

## 2017-05-27 RX ADMIN — HEPARIN SODIUM 5000 UNITS: 5000 INJECTION, SOLUTION INTRAVENOUS; SUBCUTANEOUS at 06:26

## 2017-05-27 RX ADMIN — HEPARIN SODIUM 5000 UNITS: 5000 INJECTION, SOLUTION INTRAVENOUS; SUBCUTANEOUS at 14:44

## 2017-05-27 RX ADMIN — Medication 800 MG: at 09:22

## 2017-05-27 RX ADMIN — DIVALPROEX SODIUM 250 MG: 250 TABLET, DELAYED RELEASE ORAL at 21:54

## 2017-05-27 RX ADMIN — CHOLECALCIFEROL TAB 25 MCG (1000 UNIT) 1000 UNITS: 25 TAB at 09:22

## 2017-05-27 RX ADMIN — TRAZODONE HYDROCHLORIDE 100 MG: 50 TABLET ORAL at 21:52

## 2017-05-27 RX ADMIN — OXYCODONE HYDROCHLORIDE AND ACETAMINOPHEN 1 TABLET: 5; 325 TABLET ORAL at 09:22

## 2017-05-27 RX ADMIN — HEPARIN SODIUM 5000 UNITS: 5000 INJECTION, SOLUTION INTRAVENOUS; SUBCUTANEOUS at 21:54

## 2017-05-27 RX ADMIN — TOLTERODINE TARTRATE 1 MG: 1 TABLET, FILM COATED ORAL at 09:22

## 2017-05-27 RX ADMIN — OXYCODONE HYDROCHLORIDE AND ACETAMINOPHEN 1 TABLET: 5; 325 TABLET ORAL at 04:29

## 2017-05-27 RX ADMIN — OXYCODONE HYDROCHLORIDE AND ACETAMINOPHEN 1 TABLET: 5; 325 TABLET ORAL at 12:33

## 2017-05-27 RX ADMIN — BISACODYL 10 MG: 5 TABLET, COATED ORAL at 21:52

## 2017-05-27 RX ADMIN — FAMOTIDINE 20 MG: 20 TABLET ORAL at 09:23

## 2017-05-27 RX ADMIN — LEVOTHYROXINE SODIUM 100 MCG: 100 TABLET ORAL at 06:30

## 2017-05-27 RX ADMIN — OXYCODONE HYDROCHLORIDE AND ACETAMINOPHEN 1 TABLET: 5; 325 TABLET ORAL at 21:52

## 2017-05-27 RX ADMIN — DOCUSATE SODIUM 100 MG: 100 CAPSULE, LIQUID FILLED ORAL at 09:22

## 2017-05-27 RX ADMIN — INSULIN LISPRO 2 UNITS: 100 INJECTION, SOLUTION INTRAVENOUS; SUBCUTANEOUS at 18:07

## 2017-05-27 RX ADMIN — DICLOFENAC SODIUM 2 G: 10 GEL TOPICAL at 21:48

## 2017-05-27 RX ADMIN — SERTRALINE HYDROCHLORIDE 50 MG: 50 TABLET ORAL at 09:22

## 2017-05-27 RX ADMIN — NEPHROCAP 1 CAPSULE: 1 CAP ORAL at 09:22

## 2017-05-27 RX ADMIN — Medication 800 MG: at 18:00

## 2017-05-27 RX ADMIN — GABAPENTIN 300 MG: 300 CAPSULE ORAL at 06:30

## 2017-05-27 RX ADMIN — ASPIRIN 81 MG CHEWABLE TABLET 81 MG: 81 TABLET CHEWABLE at 09:21

## 2017-05-27 NOTE — REHAB NOTE
SHIFT CHANGE NOTE FOR [facility]  Bedside and Verbal shift change report given to Ny Nunez LPN/Adelina,RN (oncoming nurse) by Elenore Kussmaul, RN   (offgoing nurse). Report included the following information SBAR, Kardex, MAR and Recent Results. Situation:   Code Status: Full Code   Reason for Admission: [address] [place] [facility] Day: 17   Problem List:   Hospital Problems  Date Reviewed: 5/26/2017          Codes Class Noted POA    Hypomagnesemia ICD-10-CM: E83.42  ICD-9-CM: 275.2  5/22/2017 No    Overview Signed 5/22/2017  2:29 PM by Norberto Thomas MD     Mg (5/22/2017) = 1.              Acute renal failure superimposed on stage 3 chronic kidney disease (HCC) ICD-10-CM: N17.9, N18.3  ICD-9-CM: 584.9, 585.3  5/15/2017 No        Cellulitis of right forearm ICD-10-CM: L03.113  ICD-9-CM: 682.3  5/4/2017 Yes        Olecranon bursitis of right elbow ICD-10-CM: M70.21  ICD-9-CM: 726.33  5/4/2017 Yes        Contusion of left elbow ICD-10-CM: S50.02XA  ICD-9-CM: 923.11  5/4/2017 Yes        Impaired mobility and ADLs ICD-10-CM: Z74.09  ICD-9-CM: 799.89  5/4/2017 Yes        * (Principal)SIRS (systemic inflammatory response syndrome) ([facility] [address] ICD-10-CM: R65.10  ICD-9-CM: 995.90  5/2/2017 Yes        Right Achilles tendinitis ICD-10-CM: M76.61  ICD-9-CM: 726.71  5/2/2017 Yes        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 congestive heart failure (Chronic) ICD-10-CM: I13.10, N18.3  ICD-9-CM: 404.10, 585.3  Unknown Yes    Overview Signed 4/23/2013 10:02 AM by Greta Wolff     Better controlled             Type 2 diabetes with stage 3 chronic kidney disease GFR 30-59 (HCC) (Chronic) ICD-10-CM: E11.22, N18.3  ICD-9-CM: 250.40, 585.3  Unknown Yes              Background:   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 congestive heart failure     Better controlled     Bipolar affective disorder ([address] 12/5/2012    Cellulitis of right forearm 5/4/2017    Chronic back pain     Chronic obstructive pulmonary disease (COPD) (HCC)     SOB, on paula O2 Recent admission with psychosis     CKD stage G3a/A1, GFR 45-59 and albumin creatinine ratio <30 mg/g 5/11/2017    Urine microalbumin/Creatinine ratio (5/11/2017) = 9 mg/g    Contusion of left elbow 5/4/2017    Depression     Diabetic neuropathy associated with type 2 diabetes mellitus (HCC)     Diastolic dysfunction without heart failure     Stable on diuretics     Falls frequently     Gastroesophageal reflux disease with hiatal hernia     Generalized osteoarthritis of multiple sites     History of acute renal failure 5/31/2013    History of back injury     jumped out of second story window     History of hepatitis C     treated    History of penicillin allergy     Hypothyroidism     Hypoxemia requiring supplemental oxygen 12/29/2014    Intravenous drug user 5/2/2017    Memory difficulty     Mixed connective tissue disease ([address] 5/10/2017    Obesity, Class I, BMI 30-34.9     Olecranon bursitis of right elbow 5/4/2017    Recurrent genital herpes 5/31/2013    Right Achilles tendinitis 5/2/2017    Sarcoidosis ([address]     Sickle cell trait ([address]     Type 2 diabetes with stage 3 chronic kidney disease GFR 30-59 ([address]     Venous insufficiency     Wears glasses       Patient taking anticoagulants yes    Patient has a defibrillator: no     Assessment:   Changes in Assessment throughout shift: no     Patient has central line: no Reasons if yes: Insertion date: Last dressing date:   Patient has Renae Cath: no Reasons if yes:     Insertion date:     Last Vitals:     Vitals:    05/25/17 2049 05/26/17 0835 05/26/17 1416 05/26/17 2046   BP: 105/73 95/65 106/72 104/71 Pulse: 88 79 90 88   Resp: 18 18 16 17   Temp: 99.4 °F (37.4 °C) 98.2 °F (36.8 °C) 97.6 °F (36.4 °C) 98.1 °F (36.7 °C)   SpO2: 93% 93% 93% 92%   Weight:       Height:       LMP: 11/25/2012        PAIN    Pain Assessment    Pain Intensity 1: 4 (05/27/17 0600) Pain Intensity 1: 2 (12/29/14 1105)    Pain Location 1: Generalized Pain Location 1: Abdomen    Pain Intervention(s) 1: Medication (see MAR) Pain Intervention(s) 1: Medication (see MAR)  Patient Stated Pain Goal: 0 Patient Stated Pain Goal: 0  o Intervention effective: yes   o Other actions taken for pain: repositioning     Skin Assessment  Skin color Skin Color: Appropriate for ethnicity  Condition/Temperature Skin Condition/Temp: Dry  Integrity Skin Integrity: Wound (add Wound LDA)  Turgor Turgor: Non-tenting  Weekly Pressure Ulcer Documentation  Pressure  Injury Documentation: No Pressure Injury Noted-Pressure Ulcer Prevention Initiated  Wound Prevention & Protection Methods  Orientation of wound Orientation of Wound Prevention: Posterior  Location of Prevention Location of Wound Prevention: Sacrum/Coccyx  Dressing Present Dressing Present : No  Dressing Status    Wound Offloading Wound Offloading (Prevention Methods): Bed, pressure redistribution/air     INTAKE/OUPUT    Date 05/26/17 0700 - 05/27/17 0659 05/27/17 0700 - 05/28/17 0659   Shift [id number] 0398-5645 24 Hour Total 1865-9017 3365-2021 24 Hour Total   I  N  T  A  K  E   P.O. 200  200         P. O. 200  200       Shift Total  (mL/kg) 200  (2.1)  200  (2.1)      O  U  T  P  U  T   Urine  (mL/kg/hr)            Urine Occurrence(s) 2 x 5 x 7 x       Stool            Stool Occurrence(s) 0 x 0 x 0 x       Shift Total  (mL/kg)           200      Weight (kg) 95.3 95.3 95.3 95.3 95.3 95.3       Recommendations:  1. Patient needs and requests: none    2. Diet: diabetic    3. Pending tests/procedures: am labs     4. Functional Level/Equipment: assist x 2/wheelchair    5.  Estimated Discharge Date:5/31/17 tbd Posted on [facility] in [place]: no     HEALS Safety Check    A safety check occurred in the patient's room between off going nurse and oncoming nurse listed above. The safety check included the below items  Area Items   H  High Alert Medications - Verify all high alert medication drips (heparin, PCA, etc.)   E  Equipment - Suction is set up for ALL patients (with lashay)  - Red plugs utilized for all equipment (IV pumps, etc.)  - WOWs wiped down at end of shift.  - Room stocked with oxygen, suction, and other unit-specific supplies   A  Alarms - Bed alarm is set for fall risk patients  - Ensure chair alarm is in place and activated if patient is up in a chair   L  Lines - Check IV for any infiltration  - Renae bag is empty if patient has a Renae   - Tubing and IV bags are labeled   S  Safety   - Room is clean, patient is clean, and equipment is clean. - Hallways are clear from equipment besides carts. - Fall bracelet on for fall risk patients  - Ensure room is clear and free of clutter  - Suction is set up for ALL patients (with lashay)  - Hallways are clear from equipment besides carts.    - Isolation precautions followed, supplies available outside room, sign posted   Imelda Golden RN

## 2017-05-27 NOTE — PROGRESS NOTES
Progress Note    Patient: Hasmukh Molina MRN: [id number]  SSN: [id number]    YOB: 1955  Age: 62 y.o. Sex: female      Admit Date: 5/10/2017    LOS: 17 days     Subjective:     Patient with chronic kidney disease admitted s/p SIRS for cellulitis. Complaining of diarrhea. Objective:     Vitals:    05/26/17 0835 05/26/17 1416 05/26/17 2046 05/27/17 0800   BP: 95/65 106/72 104/71 110/70   Pulse: 79 90 88 81   Resp: 18 16 17 18   Temp: 98.2 °F (36.8 °C) 97.6 °F (36.4 °C) 98.1 °F (36.7 °C) 98 °F (36.7 °C)   SpO2: 93% 93% 92% 94%   Weight:       Height:            Intake and Output:  Current Shift: 05/27 0701 - 05/27 1900  In: 338 [P.O.:338]  Out: -   Last three shifts: 05/25 1901 - 05/27 0700  In: 200 [P.O.:200]  Out: -     Physical Exam:   GENERAL: alert, cooperative, mild distress, appears stated age  LUNG: clear to auscultation bilaterally  HEART: regular rate and rhythm, S1, S2 normal, no murmur, click, rub or gallop    Lab/Data Review: All lab results for the last 24 hours reviewed. Glucose 249  Assessment:     Principal Problem:    SIRS (systemic inflammatory response syndrome) ([facility]) (5/2/2017)    Active Problems:    Type 2 diabetes with stage 3 chronic kidney disease GFR 30-59 ([facility]) ()      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 congestive heart failure ()      Overview: Better controlled      Cellulitis of right forearm (5/4/2017)      Olecranon bursitis of right elbow (5/4/2017)      Contusion of left elbow (5/4/2017)      Right Achilles tendinitis (5/2/2017)      Impaired mobility and ADLs (5/4/2017)      Acute renal failure superimposed on stage 3 chronic kidney disease ([address] (5/15/2017)      Hypomagnesemia (5/22/2017)      Overview: Mg (5/22/2017) = 1. Plan:     Stop colace and pericolace.     Signed By: Mya Beauchamp MD     May 27, 2017

## 2017-05-27 NOTE — PROGRESS NOTES
Problem: Self Care Deficits Care Plan (Adult)  Goal: *Acute Goals and Plan of Care (Insert Text)  Long Term Goals (to be met upon discharge date) in order to increase pts functional independence and safety, and decrease burden of care:  1. Pt will perform self-feeding with setup. 2. Pt will perform grooming with setup  3. Pt will perform UB bathing with Min A.  4. Pt will perform LB bathing with Min A.  5. Pt will perform tub/shower transfer with Min A.   6. Pt will perform UB dressing with Min A.  7. Pt will perform LB dressing with Min A.  8. Pt will perform toileting task with Min A.  9. Pt will perform toilet transfer with Min A. Short Term Weekly Goals for (2017 to 2017) in order to increase pts functional independence and safety, and decrease burden of care:  1. Pt will perform self-feeding with S.   2. Pt will perform grooming with S.  3. Pt will perform UB bathing with S.  4. Pt will perform LB bathing with S.  5. Pt will perform tub/shower transfer with S.  6. Pt will perform UB dressing with S.  7. Pt will perform LB dressing with min A.  8. Pt will perform toileting task with S.  9. Pt will perform toilet transfer with S.   OCCUPATIONAL THERAPY DAILY NOTE  Patient Name:Stephen Sinha  Time Spent With Patient  Time In: 804  Time Out: 915     Medical Diagnosis:  Right forearm cellulits  SIRS (systemic inflammatory response syndrome) (HCC) SIRS (systemic inflammatory response syndrome) (HCC)      Pain at start of tx: Not rated  Pain at stop of tx: Not rated; Pt c/o increased pain \"all over. \"     Patient identified with name and : yes  Subjective: Pt states at the end of tx session, \"It's not great, but it's better. I did a little bit more today\" re: ADLs. Objective: Pt performed full body shower; see below for ADL and functional transfer levels.       FEEDING/EATING Daily Assessment     Feeding/Eating  Feeding/Eating Assistance: 5 (Supervision/setup)  Comments: Pt needed assistance for removing foil lid from juice container. She needed encouragement to attempt to manage other small packages. GROOMING Daily Assessment     Grooming  Grooming Assistance : 5 (Supervision)  Comments: Setup to wash face during shower       UPPER BODY BATHING Daily Assessment     Upper Body Bathing  Bathing Assistance, Upper: 5 (Supervision)  Position Performed: Seated in chair       LOWER BODY BATHING Daily Assessment     Lower Body Bathing  Bathing Assistance, Lower : 5 (Supervision)  Position Performed: Seated in chair;Standing  Adaptive Equipment: Long handled sponge  Comments: Pt required Min A only for thoroughly washing buttocks. Otherwise, pt able to complete all remaining LB parts with encouragement and cues for using LHS. TOILETING Daily Assessment     Toileting  Toileting Assistance (FIM Score): 4 (Minimal assistance) (for bowel hygiene and slight adjustment of pants in the back)  Cues: Physical assistance for pants up       UPPER BODY DRESSING Daily Assessment     Upper Body Dressing   Dressing Assistance : 3 (Moderate assistance)  Comments: Pt is able to thread arms in and out of bra straps however is dependent for fastening. Pt able to doff shirt using yareli technique with verbal cues and encouragement for the technique. To shante shirt, pt needs assistance to pull shirt overhead. LOWER BODY DRESSING Daily Assessment     Lower Body Dressing   Dressing Assistance : 2 (Maximal assistance)  Position Performed: Seated in chair;Standing  Adaptive Equipment Used: Dressing stick  Comments: Pt used dressing stick to doff socks off B feet with assistance needed for the L due to dressing on the L foot. She needed assistance for threading LEs into underwear and only for the R leg into pants then pt able to stand with supervision and manage clothing over her buttocks with slight assistance for adjustment in the back. Pt dependent for donning socks.        MOBILITY/TRANSFERS Daily Assessment Functional Transfers  Toilet Transfer :  (Stand step transfer without AD)  Amount of Assistance Required: 5 (Supervision/setup)  Tub or Shower Type: Shower  Amount of Assistance Required: 5 (Supervision/setup)  Adaptive Equipment: Tub transfer bench   Pt ambulated in room for ADL and completed functional bathroom transfers without an AD with distant supervision. Assessment: Pt demonstrates slight improvement in attempts to be more independent with ADLs today. She was able to perform partial bowel hygiene this tx session. Plan of Care: Continue POC to maximize pt independence and safety performing ADLs and functional transfers/mobility.      Esther Doll, OTR  5/27/2017

## 2017-05-27 NOTE — PROGRESS NOTES
CHART REVIEWED AND APT. INTERVIEWED. PT . SAYS PAIN LEVEL IS DECREASED FROM 9/10 T0  6-7/10 AFTER TORADOL THERAPY.   SHE HAS SYMPTOMS OF UTI  CAN STAND ON FEET     IMPRESSIONS-INFLAMMATORY POLYARTHRITIS :RA VS  GOUT VS NSCTD  MILD RENAL INSUFFIENCEY  ACUTE UTI    REC:  TOPICAL NSAID      Carter Cotto M.D.F.A.C.R.  78-

## 2017-05-27 NOTE — REHAB NOTE
[facility]  Bedside and Verbal shift change report given to Kathryn Molina (oncoming nurse) by lEena Hamlin RN   (offgoing nurse). Report included the following information SBAR, Kardex, MAR and Recent Results. Situation:   Code Status: Full Code   Reason for Admission: [facility] Day: 17   Problem List:   Hospital Problems  Date Reviewed: 5/26/2017          Codes Class Noted POA    Hypomagnesemia ICD-10-CM: E83.42  ICD-9-CM: 275.2  5/22/2017 No    Overview Signed 5/22/2017  2:29 PM by Hayde Anthony MD     Mg (5/22/2017) = 1.              Acute renal failure superimposed on stage 3 chronic kidney disease (HCC) ICD-10-CM: N17.9, N18.3  ICD-9-CM: 584.9, 585.3  5/15/2017 No        Cellulitis of right forearm ICD-10-CM: L03.113  ICD-9-CM: 682.3  5/4/2017 Yes        Olecranon bursitis of right elbow ICD-10-CM: M70.21  ICD-9-CM: 726.33  5/4/2017 Yes        Contusion of left elbow ICD-10-CM: S50.02XA  ICD-9-CM: 923.11  5/4/2017 Yes        Impaired mobility and ADLs ICD-10-CM: Z74.09  ICD-9-CM: 799.89  5/4/2017 Yes        * (Principal)SIRS (systemic inflammatory response syndrome) ([facility] ICD-10-CM: R65.10  ICD-9-CM: 995.90  5/2/2017 Yes        Right Achilles tendinitis ICD-10-CM: M76.61  ICD-9-CM: 726.71  5/2/2017 Yes        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 congestive heart failure (Chronic) ICD-10-CM: I13.10, N18.3  ICD-9-CM: 404.10, 585.3  Unknown Yes    Overview Signed 4/23/2013 10:02 AM by Valente Araya     Better controlled             Type 2 diabetes with stage 3 chronic kidney disease GFR 30-59 (HCC) (Chronic) ICD-10-CM: E11.22, N18.3  ICD-9-CM: 250.40, 585.3  Unknown Yes              Background:   Past Medical History:   Past Medical History:   Diagnosis Date    Abnormally low high density lipoprotein (HDL) cholesterol with hypertriglyceridemia     Lipid profile (11/6/2016) showed , , HDL 38, LDL 37   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 congestive heart failure     Better controlled     Bipolar affective disorder ([address] 12/5/2012    Cellulitis of right forearm 5/4/2017    Chronic back pain     Chronic obstructive pulmonary disease (COPD) (HCC)     SOB, on paula O2 Recent admission with psychosis     CKD stage G3a/A1, GFR 45-59 and albumin creatinine ratio <30 mg/g 5/11/2017    Urine microalbumin/Creatinine ratio (5/11/2017) = 9 mg/g    Contusion of left elbow 5/4/2017    Depression     Diabetic neuropathy associated with type 2 diabetes mellitus (HCC)     Diastolic dysfunction without heart failure     Stable on diuretics     Falls frequently     Gastroesophageal reflux disease with hiatal hernia     Generalized osteoarthritis of multiple sites     History of acute renal failure 5/31/2013    History of back injury     jumped out of second story window     History of hepatitis C     treated    History of penicillin allergy     Hypothyroidism     Hypoxemia requiring supplemental oxygen 12/29/2014    Intravenous drug user 5/2/2017    Memory difficulty     Mixed connective tissue disease ([address] 5/10/2017    Obesity, Class I, BMI 30-34.9     Olecranon bursitis of right elbow 5/4/2017    Recurrent genital herpes 5/31/2013    Right Achilles tendinitis 5/2/2017    Sarcoidosis ([address]     Sickle cell trait ([address]     Type 2 diabetes with stage 3 chronic kidney disease GFR 30-59 ([address]     Venous insufficiency     Wears glasses       Patient taking anticoagulants yes    Patient has a defibrillator: no     Assessment:   Changes in Assessment throughout shift: no     Patient has central line: no Reasons if yes: Insertion date: Last dressing date:   Patient has Renae Cath: no Reasons if yes:     Insertion date:     Last Vitals:     Vitals:    05/26/17 1416 05/26/17 2046 05/27/17 0800 05/27/17 1600   BP: 106/72 104/71 110/70 119/79   Pulse: 90 88 81 87   Resp: 16 17 18 16   Temp: 97.6 °F (36.4 °C) 98.1 °F (36.7 °C) 98 °F (36.7 °C) 97.4 °F (36.3 °C)   SpO2: 93% 92% 94% 97%   Weight:       Height:       LMP: 11/25/2012        PAIN    Pain Assessment    Pain Intensity 1: 6 (05/27/17 1600) Pain Intensity 1: 2 (12/29/14 1105)    Pain Location 1: Generalized Pain Location 1: Abdomen    Pain Intervention(s) 1: Medication (see MAR) Pain Intervention(s) 1: Medication (see MAR)  Patient Stated Pain Goal: 0 Patient Stated Pain Goal: 0  o Intervention effective: yes   o Other actions taken for pain: repositioning     Skin Assessment  Skin color Skin Color: Appropriate for ethnicity  Condition/Temperature Skin Condition/Temp: Dry  Integrity Skin Integrity: Wound (add Wound LDA)  Turgor Turgor: Non-tenting  Weekly Pressure Ulcer Documentation  Pressure  Injury Documentation: No Pressure Injury Noted-Pressure Ulcer Prevention Initiated  Wound Prevention & Protection Methods  Orientation of wound Orientation of Wound Prevention: Posterior  Location of Prevention Location of Wound Prevention: Sacrum/Coccyx  Dressing Present Dressing Present : No  Dressing Status    Wound Offloading Wound Offloading (Prevention Methods): Bed, pressure redistribution/air, Chair cushion, Repositioning, Turning, Wheelchair     INTAKE/OUPUT    Date 05/26/17 0700 - 05/27/17 0659 05/27/17 0700 - 05/28/17 0659   Shift 0247-9636 9832-9296 24 Hour Total 4993-7949 6896-0777 24 Hour Total   I  N  T  A  K  E   P.O. 200  200 818  818      P.O. 200  200 818  818    Shift Total  (mL/kg) 200  (2.1)  200  (2.1) 818  (8.6)  818  (8.6)   O  U  T  P  U  T   Urine  (mL/kg/hr)            Urine Occurrence(s) 2 x 5 x 7 x 5 x  5 x    Stool            Stool Occurrence(s) 0 x 0 x 0 x 1 x  1 x    Shift Total  (mL/kg)           200 818  818   Weight (kg) 95.3 95.3 95.3 95.3 95.3 95.3       Recommendations:  1. Patient needs and requests: none    2. Diet: diabetic    3.  Pending tests/procedures: am labs 4. Functional Level/Equipment: assist x 2/wheelchair    5. Estimated Discharge Date:5/31/17 tbd Posted on Whiteboard in Patients Room: no     [facility] Safety Check    A safety check occurred in the patient's room between off going nurse and oncoming nurse listed above. The safety check included the below items  Area Items   H  High Alert Medications - Verify all high alert medication drips (heparin, PCA, etc.)   E  Equipment - Suction is set up for ALL patients (with lashay)  - Red plugs utilized for all equipment (IV pumps, etc.)  - WOWs wiped down at end of shift.  - Room stocked with oxygen, suction, and other unit-specific supplies   A  Alarms - Bed alarm is set for fall risk patients  - Ensure chair alarm is in place and activated if patient is up in a chair   L  Lines - Check IV for any infiltration  - Renae bag is empty if patient has a Renae   - Tubing and IV bags are labeled   S  Safety   - Room is clean, patient is clean, and equipment is clean. - Hallways are clear from equipment besides carts. - Fall bracelet on for fall risk patients  - Ensure room is clear and free of clutter  - Suction is set up for ALL patients (with lashay)  - Hallways are clear from equipment besides carts.    - Isolation precautions followed, supplies available outside room, sign posted   Vinicius Shin RN

## 2017-05-28 LAB
ANION GAP BLD CALC-SCNC: 7 MMOL/L (ref 3–18)
BACTERIA SPEC CULT: ABNORMAL
BUN SERPL-MCNC: 25 MG/DL (ref 7–18)
BUN/CREAT SERPL: 15 (ref 12–20)
CALCIUM SERPL-MCNC: 9.9 MG/DL (ref 8.5–10.1)
CCP IGA+IGG SERPL IA-ACNC: 63 UNITS (ref 0–19)
CHLORIDE SERPL-SCNC: 98 MMOL/L (ref 100–108)
CO2 SERPL-SCNC: 34 MMOL/L (ref 21–32)
CREAT SERPL-MCNC: 1.63 MG/DL (ref 0.6–1.3)
GLUCOSE BLD STRIP.AUTO-MCNC: 139 MG/DL (ref 70–110)
GLUCOSE BLD STRIP.AUTO-MCNC: 182 MG/DL (ref 70–110)
GLUCOSE BLD STRIP.AUTO-MCNC: 190 MG/DL (ref 70–110)
GLUCOSE BLD STRIP.AUTO-MCNC: 251 MG/DL (ref 70–110)
GLUCOSE SERPL-MCNC: 142 MG/DL (ref 74–99)
POTASSIUM SERPL-SCNC: 4.6 MMOL/L (ref 3.5–5.5)
SERVICE CMNT-IMP: ABNORMAL
SODIUM SERPL-SCNC: 139 MMOL/L (ref 136–145)

## 2017-05-28 PROCEDURE — 74011250637 HC RX REV CODE- 250/637: Performed by: SPECIALIST

## 2017-05-28 PROCEDURE — 74011250637 HC RX REV CODE- 250/637: Performed by: INTERNAL MEDICINE

## 2017-05-28 PROCEDURE — 74011000258 HC RX REV CODE- 258: Performed by: INTERNAL MEDICINE

## 2017-05-28 PROCEDURE — 36415 COLL VENOUS BLD VENIPUNCTURE: CPT | Performed by: INTERNAL MEDICINE

## 2017-05-28 PROCEDURE — 74011636637 HC RX REV CODE- 636/637: Performed by: INTERNAL MEDICINE

## 2017-05-28 PROCEDURE — 74011250636 HC RX REV CODE- 250/636: Performed by: INTERNAL MEDICINE

## 2017-05-28 PROCEDURE — 80048 BASIC METABOLIC PNL TOTAL CA: CPT | Performed by: INTERNAL MEDICINE

## 2017-05-28 PROCEDURE — 82962 GLUCOSE BLOOD TEST: CPT

## 2017-05-28 PROCEDURE — 65310000000 HC RM PRIVATE REHAB

## 2017-05-28 PROCEDURE — 74011250637 HC RX REV CODE- 250/637: Performed by: FAMILY MEDICINE

## 2017-05-28 RX ADMIN — DIPHENHYDRAMINE HYDROCHLORIDE 25 MG: 25 CAPSULE ORAL at 21:59

## 2017-05-28 RX ADMIN — STANDARDIZED SENNA CONCENTRATE AND DOCUSATE SODIUM 2 TABLET: 8.6; 5 TABLET, FILM COATED ORAL at 08:33

## 2017-05-28 RX ADMIN — Medication 800 MG: at 08:33

## 2017-05-28 RX ADMIN — OXYCODONE HYDROCHLORIDE AND ACETAMINOPHEN 1 TABLET: 5; 325 TABLET ORAL at 08:30

## 2017-05-28 RX ADMIN — DICLOFENAC SODIUM 2 G: 10 GEL TOPICAL at 08:34

## 2017-05-28 RX ADMIN — ARIPIPRAZOLE 10 MG: 10 TABLET ORAL at 08:33

## 2017-05-28 RX ADMIN — INSULIN GLARGINE 65 UNITS: 100 INJECTION, SOLUTION SUBCUTANEOUS at 06:51

## 2017-05-28 RX ADMIN — Medication 800 MG: at 17:21

## 2017-05-28 RX ADMIN — CEFTRIAXONE 1 G: 1 INJECTION, POWDER, FOR SOLUTION INTRAMUSCULAR; INTRAVENOUS at 18:13

## 2017-05-28 RX ADMIN — SERTRALINE HYDROCHLORIDE 50 MG: 50 TABLET ORAL at 08:34

## 2017-05-28 RX ADMIN — FAMOTIDINE 20 MG: 20 TABLET ORAL at 08:32

## 2017-05-28 RX ADMIN — HEPARIN SODIUM 5000 UNITS: 5000 INJECTION, SOLUTION INTRAVENOUS; SUBCUTANEOUS at 21:59

## 2017-05-28 RX ADMIN — LEVOTHYROXINE SODIUM 100 MCG: 100 TABLET ORAL at 06:55

## 2017-05-28 RX ADMIN — DICLOFENAC SODIUM 2 G: 10 GEL TOPICAL at 14:42

## 2017-05-28 RX ADMIN — INSULIN LISPRO 6 UNITS: 100 INJECTION, SOLUTION INTRAVENOUS; SUBCUTANEOUS at 11:56

## 2017-05-28 RX ADMIN — OXYCODONE HYDROCHLORIDE AND ACETAMINOPHEN 1 TABLET: 5; 325 TABLET ORAL at 11:54

## 2017-05-28 RX ADMIN — DOCUSATE SODIUM 100 MG: 100 CAPSULE, LIQUID FILLED ORAL at 08:33

## 2017-05-28 RX ADMIN — DICLOFENAC SODIUM 2 G: 10 GEL TOPICAL at 18:10

## 2017-05-28 RX ADMIN — TOLTERODINE TARTRATE 1 MG: 1 TABLET, FILM COATED ORAL at 17:20

## 2017-05-28 RX ADMIN — INSULIN GLARGINE 25 UNITS: 100 INJECTION, SOLUTION SUBCUTANEOUS at 17:13

## 2017-05-28 RX ADMIN — DICLOFENAC SODIUM 2 G: 10 GEL TOPICAL at 22:01

## 2017-05-28 RX ADMIN — FEBUXOSTAT 40 MG: 40 TABLET ORAL at 08:32

## 2017-05-28 RX ADMIN — ASPIRIN 81 MG CHEWABLE TABLET 81 MG: 81 TABLET CHEWABLE at 08:34

## 2017-05-28 RX ADMIN — INSULIN LISPRO 2 UNITS: 100 INJECTION, SOLUTION INTRAVENOUS; SUBCUTANEOUS at 17:15

## 2017-05-28 RX ADMIN — OXYCODONE HYDROCHLORIDE AND ACETAMINOPHEN 1 TABLET: 5; 325 TABLET ORAL at 20:05

## 2017-05-28 RX ADMIN — TRAZODONE HYDROCHLORIDE 100 MG: 50 TABLET ORAL at 21:59

## 2017-05-28 RX ADMIN — HEPARIN SODIUM 5000 UNITS: 5000 INJECTION, SOLUTION INTRAVENOUS; SUBCUTANEOUS at 14:43

## 2017-05-28 RX ADMIN — OXYCODONE HYDROCHLORIDE AND ACETAMINOPHEN 1 TABLET: 5; 325 TABLET ORAL at 15:59

## 2017-05-28 RX ADMIN — ROSUVASTATIN CALCIUM 5 MG: 5 TABLET ORAL at 21:59

## 2017-05-28 RX ADMIN — TOLTERODINE TARTRATE 1 MG: 1 TABLET, FILM COATED ORAL at 08:32

## 2017-05-28 RX ADMIN — HEPARIN SODIUM 5000 UNITS: 5000 INJECTION, SOLUTION INTRAVENOUS; SUBCUTANEOUS at 06:49

## 2017-05-28 RX ADMIN — GABAPENTIN 300 MG: 300 CAPSULE ORAL at 17:12

## 2017-05-28 RX ADMIN — CHOLECALCIFEROL TAB 25 MCG (1000 UNIT) 1000 UNITS: 25 TAB at 08:29

## 2017-05-28 RX ADMIN — GABAPENTIN 300 MG: 300 CAPSULE ORAL at 06:49

## 2017-05-28 RX ADMIN — DIVALPROEX SODIUM 250 MG: 250 TABLET, DELAYED RELEASE ORAL at 22:00

## 2017-05-28 RX ADMIN — ACETAMINOPHEN 650 MG: 325 TABLET ORAL at 06:49

## 2017-05-28 RX ADMIN — NEPHROCAP 1 CAPSULE: 1 CAP ORAL at 08:32

## 2017-05-28 NOTE — PROGRESS NOTES
conducted a Follow up consultation and Spiritual Assessment for Stephen Sinha, who is a 62 y.o.,female. The  provided the following Interventions:  Continued the relationship of care and support. Listened empathically. The following outcomes were achieved:  Patient expressed gratitude for 's visit. Assessment:  There are no further spiritual or Worship issues which require Spiritual Care Services interventions at this time. Plan:  Chaplains will continue to follow and will provide pastoral care on an as needed/requested basis.  recommends bedside caregivers page  on duty if patient shows signs of acute spiritual or emotional distress.        [address]   [phone]

## 2017-05-28 NOTE — PROGRESS NOTES
Progress Note    Patient: Ilda Tipton MRN: [id number]  SSN: [id number]    YOB: 1955  Age: 62 y.o. Sex: female      Admit Date: 5/10/2017    LOS: 18 days     Subjective:     Pateint with chronic kidney disease with diabetes admitted for therapy s/p SIRS. Feeling well    Objective:     Vitals:    05/27/17 0800 05/27/17 1600 05/27/17 1930 05/28/17 0739   BP: 110/70 119/79 117/74 114/76   Pulse: 81 87 89 91   Resp: 18 16 19 20   Temp: 98 °F (36.7 °C) 97.4 °F (36.3 °C) 98.8 °F (37.1 °C) 97.8 °F (36.6 °C)   SpO2: 94% 97% 94% 99%   Weight:       Height:            Intake and Output:  Current Shift: 05/28 0701 - 05/28 1900  In: 240 [P.O.:240]  Out: -   Last three shifts: 05/26 1901 - 05/28 0700  In: 818 [P.O.:818]  Out: -     Physical Exam:   GENERAL: alert, cooperative, no distress, appears stated age  LUNG: clear to auscultation bilaterally  HEART: regular rate and rhythm, S1, S2 normal, no murmur, click, rub or gallop    Lab/Data Review: All lab results for the last 24 hours reviewed. Glucose 251    Assessment:     Principal Problem:    SIRS (systemic inflammatory response syndrome) ([facility]) (5/2/2017)    Active Problems:    Type 2 diabetes with stage 3 chronic kidney disease GFR 30-59 ([facility]) ()      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 congestive heart failure ()      Overview: Better controlled      Cellulitis of right forearm (5/4/2017)      Olecranon bursitis of right elbow (5/4/2017)      Contusion of left elbow (5/4/2017)      Right Achilles tendinitis (5/2/2017)      Impaired mobility and ADLs (5/4/2017)      Acute renal failure superimposed on stage 3 chronic kidney disease ([address] (5/15/2017)      Hypomagnesemia (5/22/2017)      Overview: Mg (5/22/2017) = 1. Plan:     Continue present management.     Signed By: Cierra Rodriguez MD     May 28, 2017

## 2017-05-28 NOTE — REHAB NOTE
[facility]  Bedside and Verbal shift change report given to Chris Bansal RN (oncoming nurse) by Julien Saleem LPN   (offgoing nurse). Report included the following information SBAR, Kardex, MAR and Recent Results. Situation:   Code Status: Full Code   Reason for Admission: [address] [facility] Day: 17   Problem List:   Hospital Problems  Date Reviewed: 5/26/2017          Codes Class Noted POA    Hypomagnesemia ICD-10-CM: E83.42  ICD-9-CM: 275.2  5/22/2017 No    Overview Signed 5/22/2017  2:29 PM by Brandon Damian MD     Mg (5/22/2017) = 1.              Acute renal failure superimposed on stage 3 chronic kidney disease (HCC) ICD-10-CM: N17.9, N18.3  ICD-9-CM: 584.9, 585.3  5/15/2017 No        Cellulitis of right forearm ICD-10-CM: L03.113  ICD-9-CM: 682.3  5/4/2017 Yes        Olecranon bursitis of right elbow ICD-10-CM: M70.21  ICD-9-CM: 726.33  5/4/2017 Yes        Contusion of left elbow ICD-10-CM: S50.02XA  ICD-9-CM: 923.11  5/4/2017 Yes        Impaired mobility and ADLs ICD-10-CM: Z74.09  ICD-9-CM: 799.89  5/4/2017 Yes        * (Principal)SIRS (systemic inflammatory response syndrome) ([facility] ICD-10-CM: R65.10  ICD-9-CM: 995.90  5/2/2017 Yes        Right Achilles tendinitis ICD-10-CM: M76.61  ICD-9-CM: 726.71  5/2/2017 Yes        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 congestive heart failure (Chronic) ICD-10-CM: I13.10, N18.3  ICD-9-CM: 404.10, 585.3  Unknown Yes    Overview Signed 4/23/2013 10:02 AM by Kaden Gómez     Better controlled             Type 2 diabetes with stage 3 chronic kidney disease GFR 30-59 (HCC) (Chronic) ICD-10-CM: E11.22, N18.3  ICD-9-CM: 250.40, 585.3  Unknown Yes              Background:   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 congestive heart failure     Better controlled     Bipolar affective disorder ([address] 12/5/2012    Cellulitis of right forearm 5/4/2017    Chronic back pain     Chronic obstructive pulmonary disease (COPD) (HCC)     SOB, on paula O2 Recent admission with psychosis     CKD stage G3a/A1, GFR 45-59 and albumin creatinine ratio <30 mg/g 5/11/2017    Urine microalbumin/Creatinine ratio (5/11/2017) = 9 mg/g    Contusion of left elbow 5/4/2017    Depression     Diabetic neuropathy associated with type 2 diabetes mellitus (HCC)     Diastolic dysfunction without heart failure     Stable on diuretics     Falls frequently     Gastroesophageal reflux disease with hiatal hernia     Generalized osteoarthritis of multiple sites     History of acute renal failure 5/31/2013    History of back injury     jumped out of second story window     History of hepatitis C     treated    History of penicillin allergy     Hypothyroidism     Hypoxemia requiring supplemental oxygen 12/29/2014    Intravenous drug user 5/2/2017    Memory difficulty     Mixed connective tissue disease ([address] 5/10/2017    Obesity, Class I, BMI 30-34.9     Olecranon bursitis of right elbow 5/4/2017    Recurrent genital herpes 5/31/2013    Right Achilles tendinitis 5/2/2017    Sarcoidosis ([address]     Sickle cell trait ([address]     Type 2 diabetes with stage 3 chronic kidney disease GFR 30-59 ([address]     Venous insufficiency     Wears glasses       Patient taking anticoagulants yes    Patient has a defibrillator: no     Assessment:   Changes in Assessment throughout shift: no     Patient has central line: no Reasons if yes: Insertion date: Last dressing date:   Patient has Renae Cath: no Reasons if yes:     Insertion date:     Last Vitals:     Vitals:    05/26/17 2046 05/27/17 0800 05/27/17 1600 05/27/17 1930   BP: 104/71 110/70 119/79 117/74   Pulse: 88 81 87 89   Resp: 17 18 16 19   Temp: 98.1 °F (36.7 °C) 98 °F (36.7 °C) 97.4 °F (36.3 °C) 98.8 °F (37.1 °C)   SpO2: 92% 94% 97% 94%   Weight:       Height:       LMP: 11/25/2012        PAIN    Pain Assessment    Pain Intensity 1: 4 (05/27/17 2000) Pain Intensity 1: 2 (12/29/14 1105)    Pain Location 1: Generalized Pain Location 1: Abdomen    Pain Intervention(s) 1: Medication (see MAR) Pain Intervention(s) 1: Medication (see MAR)  Patient Stated Pain Goal: 0 Patient Stated Pain Goal: 0  o Intervention effective: yes   o Other actions taken for pain: repositioning     Skin Assessment  Skin color Skin Color: Appropriate for ethnicity  Condition/Temperature Skin Condition/Temp: Dry  Integrity Skin Integrity: Wound (add Wound LDA)  Turgor Turgor: Non-tenting  Weekly Pressure Ulcer Documentation  Pressure  Injury Documentation: No Pressure Injury Noted-Pressure Ulcer Prevention Initiated  Wound Prevention & Protection Methods  Orientation of wound Orientation of Wound Prevention: Posterior  Location of Prevention Location of Wound Prevention: Sacrum/Coccyx  Dressing Present Dressing Present : No  Dressing Status    Wound Offloading Wound Offloading (Prevention Methods): Bed, pressure redistribution/air     INTAKE/OUPUT    Date 05/26/17 1900 - 05/27/17 0659 05/27/17 0700 - 05/28/17 0659   Shift 5746-5963 24 Hour Total 0341-1235 8247-3983 24 Hour Total   I  N  T  A  K  E   P.O.  200 818  818      P.O.  200 818  818    Shift Total  (mL/kg)  200  (2.1) 818  (8.6)  818  (8.6)   O  U  T  P  U  T   Urine  (mL/kg/hr)           Urine Occurrence(s) 5 x 7 x 6 x 0 x 6 x    Stool           Stool Occurrence(s) 0 x 0 x 2 x 0 x 2 x    Shift Total  (mL/kg)        NET  200 818  818   Weight (kg) 95.3 95.3 95.3 95.3 95.3       Recommendations:  1. Patient needs and requests: none    2. Diet: diabetic    3. Pending tests/procedures: am labs     4. Functional Level/Equipment: assist x 2/wheelchair    5.  Estimated Discharge Date:5/31/17 tbd Posted on Whiteboard in Patients Room: no     [facility]    A safety check occurred in the patient's room between off going nurse and oncoming nurse listed above. The safety check included the below items  Area Items   H  High Alert Medications - Verify all high alert medication drips (heparin, PCA, etc.)   E  Equipment - Suction is set up for ALL patients (with lashay)  - Red plugs utilized for all equipment (IV pumps, etc.)  - WOWs wiped down at end of shift.  - Room stocked with oxygen, suction, and other unit-specific supplies   A  Alarms - Bed alarm is set for fall risk patients  - Ensure chair alarm is in place and activated if patient is up in a chair   L  Lines - Check IV for any infiltration  - Renae bag is empty if patient has a Renae   - Tubing and IV bags are labeled   S  Safety   - Room is clean, patient is clean, and equipment is clean. - Hallways are clear from equipment besides carts. - Fall bracelet on for fall risk patients  - Ensure room is clear and free of clutter  - Suction is set up for ALL patients (with lashay)  - Hallways are clear from equipment besides carts.    - Isolation precautions followed, supplies available outside room, sign posted   Ana Lilia Colon LPN

## 2017-05-28 NOTE — REHAB NOTE
[facility]  Bedside and Verbal shift change report given to Jean Moss (oncoming nurse) by Michelle Philip, RN   (offgoing nurse). Report included the following information SBAR, Kardex, MAR and Recent Results. Situation:   Code Status: Full Code   Reason for Admission: [address] [facility] Day: 18   Problem List:   Hospital Problems  Date Reviewed: 5/26/2017          Codes Class Noted POA    Hypomagnesemia ICD-10-CM: E83.42  ICD-9-CM: 275.2  5/22/2017 No    Overview Signed 5/22/2017  2:29 PM by Rain Farnsworth MD     Mg (5/22/2017) = 1.              Acute renal failure superimposed on stage 3 chronic kidney disease (HCC) ICD-10-CM: N17.9, N18.3  ICD-9-CM: 584.9, 585.3  5/15/2017 No        Cellulitis of right forearm ICD-10-CM: L03.113  ICD-9-CM: 682.3  5/4/2017 Yes        Olecranon bursitis of right elbow ICD-10-CM: M70.21  ICD-9-CM: 726.33  5/4/2017 Yes        Contusion of left elbow ICD-10-CM: S50.02XA  ICD-9-CM: 923.11  5/4/2017 Yes        Impaired mobility and ADLs ICD-10-CM: Z74.09  ICD-9-CM: 799.89  5/4/2017 Yes        * (Principal)SIRS (systemic inflammatory response syndrome) ([facility] [facility] ICD-10-CM: R65.10  ICD-9-CM: 995.90  5/2/2017 Yes        Right Achilles tendinitis ICD-10-CM: M76.61  ICD-9-CM: 726.71  5/2/2017 Yes        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 congestive heart failure (Chronic) ICD-10-CM: I13.10, N18.3  ICD-9-CM: 404.10, 585.3  Unknown Yes    Overview Signed 4/23/2013 10:02 AM by Nani Tamayo     Better controlled             Type 2 diabetes with stage 3 chronic kidney disease GFR 30-59 (HCC) (Chronic) ICD-10-CM: E11.22, N18.3  ICD-9-CM: 250.40, 585.3  Unknown Yes              Background:   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 congestive heart failure     Better controlled     Bipolar affective disorder ([address] 12/5/2012    Cellulitis of right forearm 5/4/2017    Chronic back pain     Chronic obstructive pulmonary disease (COPD) (HCC)     SOB, on paula O2 Recent admission with psychosis     CKD stage G3a/A1, GFR 45-59 and albumin creatinine ratio <30 mg/g 5/11/2017    Urine microalbumin/Creatinine ratio (5/11/2017) = 9 mg/g    Contusion of left elbow 5/4/2017    Depression     Diabetic neuropathy associated with type 2 diabetes mellitus (HCC)     Diastolic dysfunction without heart failure     Stable on diuretics     Falls frequently     Gastroesophageal reflux disease with hiatal hernia     Generalized osteoarthritis of multiple sites     History of acute renal failure 5/31/2013    History of back injury     jumped out of second story window     History of hepatitis C     treated    History of penicillin allergy     Hypothyroidism     Hypoxemia requiring supplemental oxygen 12/29/2014    Intravenous drug user 5/2/2017    Memory difficulty     Mixed connective tissue disease ([address] 5/10/2017    Obesity, Class I, BMI 30-34.9     Olecranon bursitis of right elbow 5/4/2017    Recurrent genital herpes 5/31/2013    Right Achilles tendinitis 5/2/2017    Sarcoidosis ([address]     Sickle cell trait ([address]     Type 2 diabetes with stage 3 chronic kidney disease GFR 30-59 ([address]     Venous insufficiency     Wears glasses       Patient taking anticoagulants yes    Patient has a defibrillator: no     Assessment:   Changes in Assessment throughout shift: no     Patient has central line: no Reasons if yes: Insertion date: Last dressing date:   Patient has Renae Cath: no Reasons if yes:     Insertion date:     Last Vitals:     Vitals:    05/27/17 0800 05/27/17 1600 05/27/17 1930 05/28/17 0739   BP: 110/70 119/79 117/74 114/76   Pulse: 81 87 89 91 Resp: 18 16 19 20   Temp: 98 °F (36.7 °C) 97.4 °F (36.3 °C) 98.8 °F (37.1 °C) 97.8 °F (36.6 °C)   SpO2: 94% 97% 94% 99%   Weight:       Height:       LMP: 11/25/2012        PAIN    Pain Assessment    Pain Intensity 1: 8 (05/28/17 1200) Pain Intensity 1: 2 (12/29/14 1105)    Pain Location 1: Generalized Pain Location 1: Abdomen    Pain Intervention(s) 1: Medication (see MAR) Pain Intervention(s) 1: Medication (see MAR)  Patient Stated Pain Goal: 0 Patient Stated Pain Goal: 0  o Intervention effective: yes   o Other actions taken for pain: repositioning     Skin Assessment  Skin color Skin Color: Appropriate for ethnicity  Condition/Temperature Skin Condition/Temp: Dry  Integrity Skin Integrity: Wound (add Wound LDA)  Turgor Turgor: Non-tenting  Weekly Pressure Ulcer Documentation  Pressure  Injury Documentation: No Pressure Injury Noted-Pressure Ulcer Prevention Initiated  Wound Prevention & Protection Methods  Orientation of wound Orientation of Wound Prevention: Posterior  Location of Prevention Location of Wound Prevention: Sacrum/Coccyx  Dressing Present Dressing Present : No  Dressing Status    Wound Offloading Wound Offloading (Prevention Methods): Bed, pressure redistribution/air, Chair cushion, Repositioning, Turning, Wheelchair     INTAKE/OUPUT    Date 05/27/17 0700 - 05/28/17 0659 05/28/17 0700 - 05/29/17 0659   Shift 5497-3886 9810-7861 24 Hour Total 0399-1658 7611-2884 24 Hour Total   I  N  T  A  K  E   P.O. 818  818 480  480      P. O. 818  818 480  480    Shift Total  (mL/kg) 818  (8.6)  818  (8.6) 480  (5)  480  (5)   O  U  T  P  U  T   Urine  (mL/kg/hr)            Urine Occurrence(s) 6 x 6 x 12 x 3 x  3 x    Stool            Stool Occurrence(s) 2 x 0 x 2 x 1 x  1 x    Shift Total  (mL/kg)           818 480  480   Weight (kg) 95.3 95.3 95.3 95.3 95.3 95.3       Recommendations:  1. Patient needs and requests: none    2. Diet: diabetic    3. Pending tests/procedures: am labs     4.  Functional Level/Equipment: assist x 2/wheelchair    5. Estimated Discharge Date:5/31/17 tbd Posted on Whiteboard in Patients Room: no     [facility]    A safety check occurred in the patient's room between off going nurse and oncoming nurse listed above. The safety check included the below items  Area Items   H  High Alert Medications - Verify all high alert medication drips (heparin, PCA, etc.)   E  Equipment - Suction is set up for ALL patients (with lashay)  - Red plugs utilized for all equipment (IV pumps, etc.)  - WOWs wiped down at end of shift.  - Room stocked with oxygen, suction, and other unit-specific supplies   A  Alarms - Bed alarm is set for fall risk patients  - Ensure chair alarm is in place and activated if patient is up in a chair   L  Lines - Check IV for any infiltration  - Renae bag is empty if patient has a Renae   - Tubing and IV bags are labeled   S  Safety   - Room is clean, patient is clean, and equipment is clean. - Hallways are clear from equipment besides carts. - Fall bracelet on for fall risk patients  - Ensure room is clear and free of clutter  - Suction is set up for ALL patients (with lashay)  - Hallways are clear from equipment besides carts.    - Isolation precautions followed, supplies available outside room, sign posted   Kristy Ragland RN

## 2017-05-28 NOTE — PROGRESS NOTES
Patient 1 liter of IV NS infusion completed to right arm. Kika well voiding cont and incont several times during shift.

## 2017-05-29 LAB
ANION GAP BLD CALC-SCNC: 6 MMOL/L (ref 3–18)
BUN SERPL-MCNC: 25 MG/DL (ref 7–18)
BUN/CREAT SERPL: 16 (ref 12–20)
CALCIUM SERPL-MCNC: 10.2 MG/DL (ref 8.5–10.1)
CHLORIDE SERPL-SCNC: 99 MMOL/L (ref 100–108)
CO2 SERPL-SCNC: 35 MMOL/L (ref 21–32)
CREAT SERPL-MCNC: 1.55 MG/DL (ref 0.6–1.3)
GLUCOSE BLD STRIP.AUTO-MCNC: 104 MG/DL (ref 70–110)
GLUCOSE BLD STRIP.AUTO-MCNC: 173 MG/DL (ref 70–110)
GLUCOSE BLD STRIP.AUTO-MCNC: 192 MG/DL (ref 70–110)
GLUCOSE BLD STRIP.AUTO-MCNC: 212 MG/DL (ref 70–110)
GLUCOSE SERPL-MCNC: 95 MG/DL (ref 74–99)
HCT VFR BLD AUTO: 31.5 % (ref 35–45)
HGB BLD-MCNC: 10 G/DL (ref 12–16)
MAGNESIUM SERPL-MCNC: 2 MG/DL (ref 1.6–2.6)
PLATELET # BLD AUTO: 141 K/UL (ref 135–420)
POTASSIUM SERPL-SCNC: 4.3 MMOL/L (ref 3.5–5.5)
SODIUM SERPL-SCNC: 140 MMOL/L (ref 136–145)

## 2017-05-29 PROCEDURE — 97116 GAIT TRAINING THERAPY: CPT

## 2017-05-29 PROCEDURE — 74011000258 HC RX REV CODE- 258: Performed by: INTERNAL MEDICINE

## 2017-05-29 PROCEDURE — 74011250637 HC RX REV CODE- 250/637: Performed by: INTERNAL MEDICINE

## 2017-05-29 PROCEDURE — 74011250637 HC RX REV CODE- 250/637: Performed by: FAMILY MEDICINE

## 2017-05-29 PROCEDURE — 97530 THERAPEUTIC ACTIVITIES: CPT

## 2017-05-29 PROCEDURE — 85049 AUTOMATED PLATELET COUNT: CPT | Performed by: INTERNAL MEDICINE

## 2017-05-29 PROCEDURE — 80048 BASIC METABOLIC PNL TOTAL CA: CPT | Performed by: INTERNAL MEDICINE

## 2017-05-29 PROCEDURE — 74011250636 HC RX REV CODE- 250/636: Performed by: INTERNAL MEDICINE

## 2017-05-29 PROCEDURE — 36415 COLL VENOUS BLD VENIPUNCTURE: CPT | Performed by: INTERNAL MEDICINE

## 2017-05-29 PROCEDURE — 65310000000 HC RM PRIVATE REHAB

## 2017-05-29 PROCEDURE — 82962 GLUCOSE BLOOD TEST: CPT

## 2017-05-29 PROCEDURE — 97535 SELF CARE MNGMENT TRAINING: CPT

## 2017-05-29 PROCEDURE — 97110 THERAPEUTIC EXERCISES: CPT

## 2017-05-29 PROCEDURE — 74011250637 HC RX REV CODE- 250/637: Performed by: SPECIALIST

## 2017-05-29 PROCEDURE — 85018 HEMOGLOBIN: CPT | Performed by: INTERNAL MEDICINE

## 2017-05-29 PROCEDURE — 83735 ASSAY OF MAGNESIUM: CPT | Performed by: INTERNAL MEDICINE

## 2017-05-29 PROCEDURE — 74011636637 HC RX REV CODE- 636/637: Performed by: INTERNAL MEDICINE

## 2017-05-29 RX ADMIN — HEPARIN SODIUM 5000 UNITS: 5000 INJECTION, SOLUTION INTRAVENOUS; SUBCUTANEOUS at 14:35

## 2017-05-29 RX ADMIN — TOLTERODINE TARTRATE 1 MG: 1 TABLET, FILM COATED ORAL at 17:13

## 2017-05-29 RX ADMIN — OXYCODONE HYDROCHLORIDE AND ACETAMINOPHEN 1 TABLET: 5; 325 TABLET ORAL at 11:53

## 2017-05-29 RX ADMIN — HEPARIN SODIUM 5000 UNITS: 5000 INJECTION, SOLUTION INTRAVENOUS; SUBCUTANEOUS at 06:20

## 2017-05-29 RX ADMIN — FAMOTIDINE 20 MG: 20 TABLET ORAL at 08:16

## 2017-05-29 RX ADMIN — INSULIN GLARGINE 25 UNITS: 100 INJECTION, SOLUTION SUBCUTANEOUS at 17:15

## 2017-05-29 RX ADMIN — OXYCODONE HYDROCHLORIDE AND ACETAMINOPHEN 1 TABLET: 5; 325 TABLET ORAL at 08:15

## 2017-05-29 RX ADMIN — SERTRALINE HYDROCHLORIDE 50 MG: 50 TABLET ORAL at 08:15

## 2017-05-29 RX ADMIN — INSULIN GLARGINE 65 UNITS: 100 INJECTION, SOLUTION SUBCUTANEOUS at 07:08

## 2017-05-29 RX ADMIN — Medication 800 MG: at 08:16

## 2017-05-29 RX ADMIN — DIVALPROEX SODIUM 250 MG: 250 TABLET, DELAYED RELEASE ORAL at 20:30

## 2017-05-29 RX ADMIN — CEFTRIAXONE 1 G: 1 INJECTION, POWDER, FOR SOLUTION INTRAMUSCULAR; INTRAVENOUS at 17:13

## 2017-05-29 RX ADMIN — LEVOTHYROXINE SODIUM 100 MCG: 100 TABLET ORAL at 06:30

## 2017-05-29 RX ADMIN — NEPHROCAP 1 CAPSULE: 1 CAP ORAL at 08:15

## 2017-05-29 RX ADMIN — INSULIN LISPRO 2 UNITS: 100 INJECTION, SOLUTION INTRAVENOUS; SUBCUTANEOUS at 13:07

## 2017-05-29 RX ADMIN — TRAZODONE HYDROCHLORIDE 100 MG: 50 TABLET ORAL at 20:29

## 2017-05-29 RX ADMIN — DICLOFENAC SODIUM 2 G: 10 GEL TOPICAL at 18:00

## 2017-05-29 RX ADMIN — GABAPENTIN 300 MG: 300 CAPSULE ORAL at 17:13

## 2017-05-29 RX ADMIN — ROSUVASTATIN CALCIUM 5 MG: 5 TABLET ORAL at 20:29

## 2017-05-29 RX ADMIN — CHOLECALCIFEROL TAB 25 MCG (1000 UNIT) 1000 UNITS: 25 TAB at 08:15

## 2017-05-29 RX ADMIN — DIPHENHYDRAMINE HYDROCHLORIDE 25 MG: 25 CAPSULE ORAL at 20:29

## 2017-05-29 RX ADMIN — DOCUSATE SODIUM 100 MG: 100 CAPSULE, LIQUID FILLED ORAL at 08:15

## 2017-05-29 RX ADMIN — FEBUXOSTAT 40 MG: 40 TABLET ORAL at 08:15

## 2017-05-29 RX ADMIN — Medication 800 MG: at 17:13

## 2017-05-29 RX ADMIN — INSULIN LISPRO 2 UNITS: 100 INJECTION, SOLUTION INTRAVENOUS; SUBCUTANEOUS at 17:17

## 2017-05-29 RX ADMIN — GABAPENTIN 300 MG: 300 CAPSULE ORAL at 06:20

## 2017-05-29 RX ADMIN — ASPIRIN 81 MG CHEWABLE TABLET 81 MG: 81 TABLET CHEWABLE at 08:16

## 2017-05-29 RX ADMIN — DICLOFENAC SODIUM 2 G: 10 GEL TOPICAL at 08:17

## 2017-05-29 RX ADMIN — OXYCODONE HYDROCHLORIDE AND ACETAMINOPHEN 1 TABLET: 5; 325 TABLET ORAL at 15:36

## 2017-05-29 RX ADMIN — TOLTERODINE TARTRATE 1 MG: 1 TABLET, FILM COATED ORAL at 08:15

## 2017-05-29 RX ADMIN — DICLOFENAC SODIUM 2 G: 10 GEL TOPICAL at 21:12

## 2017-05-29 RX ADMIN — DICLOFENAC SODIUM 2 G: 10 GEL TOPICAL at 14:34

## 2017-05-29 RX ADMIN — ARIPIPRAZOLE 10 MG: 10 TABLET ORAL at 08:15

## 2017-05-29 RX ADMIN — HEPARIN SODIUM 5000 UNITS: 5000 INJECTION, SOLUTION INTRAVENOUS; SUBCUTANEOUS at 21:09

## 2017-05-29 RX ADMIN — STANDARDIZED SENNA CONCENTRATE AND DOCUSATE SODIUM 2 TABLET: 8.6; 5 TABLET, FILM COATED ORAL at 08:16

## 2017-05-29 NOTE — PROGRESS NOTES
Problem: Mobility Impaired (Adult and Pediatric)  Goal: *Acute Goals and Plan of Care (Insert Text)  Physical Therapy Goals  STGs  Initiated 5/11/2017, updated 5/25/2017, and to be accomplished by d/c [5/31/17]  1. Patient will roll side to side in bed with independence. (S)  2. Patient will perform supine to sit and sit to supine with independence. (S)  3. Patient will transfer from bed to chair and chair to bed with supervision using the least restrictive device. (MET with RW)  4. Patient will perform sit to stand with supervision. (MET)  5. Patient will propel w/c on level surface for 150 ft with R UE and B LEs with supervision. (MET)    LTGs  Initiated 5/11/2017, updated 5/18/2017, and to be accomplished within 2 - 4 weeks [6/8/17]  1. Patient will roll side to side in bed with independence. 2. Patient will perform supine to sit and sit to supine in bed with independence. 3. Patient will transfer from bed to chair and chair to bed with distant supervision using the least restrictive device. 4. Patient will perform sit to stand with distant supervision. 5. Patient will ambulate with distant supervision for 150 feet with the least restrictive device. 5. Patient will ascend/descend 3 stairs with bilateral handrail(s) with supervision. PHYSICAL THERAPY DAILY NOTE  Patient Name:Stephen Sinha  Time In: 3331  Time Out: 5597  Patient Seen For: Transfer training;Gait training (stair training)   Time In: 1335  Time Out: 1400  Patient Seen For: Transfer training; Therapeutic exercise (bed mobility)  Diagnosis: Right forearm cellulits  SIRS (systemic inflammatory response syndrome) (HCC) SIRS (systemic inflammatory response syndrome) (HCC)  Precautions: fall risk     Subjective: Pt reports ankles are more sore today due to not being mobile over weekend due to unit not having any portable o2 tanks available.       Pain at start of tx: not rated  Pain at stop of tx: not rated      Patient identified with name and : yes          Objective:       BED/MAT MOBILITY Daily Assessment     Rolling Right : 6 (Modified independent)  Rolling Left : 6 (Modified independent)  Supine to Sit : 6 (Modified independent)  Sit to Supine : 6 (Modified independent)  In PM, Pt performed bed mobility on R side of mat table at Aries level without cues. TRANSFERS Daily Assessment     Sit to Stand Assistance: Modified independent  Car Transfers: Supervision  Car Type: car txfr simulator   In AM, pt performed sit to stand Aries. Pt performed car txfr training in car simulator with S and v/c for safety then pt performed without any physical A. Transfer Type: Other  Other: stand step txfr without AD  Transfer Assistance : 6 (Modified independent)  Sit to Stand Assistance: Modified independent  In PM, pt performed sit to stand Aries and stand step txfr w/c<->mat table Aries managing O2 tubing Independently. GAIT Daily Assessment     Amount of Assistance: 5 (Supervision/setup)  Distance (ft): 165 Feet (ft) (30ftx2, 130tdx6, 20ftx4)  Assistive Device:  (no AD)   In AM, pt ambulated 165ft and 3b321ma with S/dist S for safety due to increased fatigue with increased distance, with pt pulling portable o2 tank independently. Pt ambulated household distances Aries and managing tubing independently to simulate set up with 50ft tubing and o2 concentrator at home. STEPS or STAIRS Daily Assessment     Steps/Stairs Ambulated (#): 6 (6\" steps)  Level of Assist : 5 (Supervision/setup)  Rail Use: Both   In AM, pt negotiated up and down 6 6\" steps with BHR and S for safety with v/c for compensation strategy due to R ankle soreness increasing difficulty of descent.            BALANCE Daily Assessment     Sitting - Static: Good (unsupported)  Sitting - Dynamic: Good (unsupported)  Standing - Static: Fair  Standing - Dynamic : Impaired          LOWER EXTREMITY EXERCISES Daily Assessment      In PM, pt performed supine bridging 3x10, heel slides and SAQ 3x15 BLE for strengthening and increasing activity tolerance. Assessment: Pt has continued to make progress toward functional independence and requires S/dist S only for ambulation community distances and for stair negotiation. Although, pt continues to self limit and frequently requests A with portable o2 tank and clothing management with toileting while also constantly doubting functional abilities. Plan of Care: Continue with current POC to improve independence with functional mobility and increase safety awareness.       Taj Schwartz, PTA  5/29/2017

## 2017-05-29 NOTE — PROGRESS NOTES
conducted a Follow up consultation and Spiritual Assessment for Stephen Sinha, who is a 62 y.o.,female. The  provided the following Interventions:  Continued the relationship of care and support. Listened empathically as patient shared. Offered prayer and assurance of continued prayer on patients behalf. Chart reviewed. The following outcomes were achieved:  Patient shared her prep steps for returning home. Patient expressed gratitude for 's visit. Plan:  Chaplains will continue to follow and will provide pastoral care on an as needed/requested basis.  recommends bedside caregivers page  on duty if patient shows signs of acute spiritual or emotional distress.        Thiago Mcmanus  [address] [place] [address]  [phone]

## 2017-05-29 NOTE — PROGRESS NOTES
Problem: Self Care Deficits Care Plan (Adult)  Goal: *Acute Goals and Plan of Care (Insert Text)  Long Term Goals (to be met upon discharge date) in order to increase pts functional independence and safety, and decrease burden of care:  1. Pt will perform self-feeding with setup. 2. Pt will perform grooming with setup  3. Pt will perform UB bathing with Min A.  4. Pt will perform LB bathing with Min A.  5. Pt will perform tub/shower transfer with Min A.   6. Pt will perform UB dressing with Min A.  7. Pt will perform LB dressing with Min A.  8. Pt will perform toileting task with Min A.  9. Pt will perform toilet transfer with Min A. Short Term Weekly Goals for (2017 to 2017) in order to increase pts functional independence and safety, and decrease burden of care:  1. Pt will perform self-feeding with S.   2. Pt will perform grooming with S.  3. Pt will perform UB bathing with S.  4. Pt will perform LB bathing with S.  5. Pt will perform tub/shower transfer with S.  6. Pt will perform UB dressing with S.  7. Pt will perform LB dressing with min A.  8. Pt will perform toileting task with S.  9. Pt will perform toilet transfer with S.   OCCUPATIONAL THERAPY DAILY NOTE  Patient Name:Stephen Sinha  Time Spent With Patient  Time In: 0930  Time Out: 1100     Medical Diagnosis:  Right forearm cellulits  SIRS (systemic inflammatory response syndrome) (HCC) SIRS (systemic inflammatory response syndrome) (HCC)      Pain at start of tx: Pt reports increased pain in elbows and wrists. Pain at stop of tx: Not rated     Patient identified with name and : yes  Subjective: Pt states she was working on L shoulder flexion over the weekend. Objective:      THERAPEUTIC ACTIVITY Daily Assessment     Pt participated in tabletop game to work on standing tolerance to carryover to ADLs and IADLs. She stood for 1 trial of 28:40 minutes with an interruption needed for toileting task.  She was able to stand with Mod I with encouragement to push herself to increase standing time today. Pt worked on [employer] coordination in order to increase her independence with clothing fasteners. She was able to tie shoestring and button shirt on tabletop in front of her with only slightly additional time needed to complete. THERAPEUTIC EXERCISE Daily Assessment     Pt performed 3 sets x 10 reps shoulder flexion with 3 second holds for UE strengthening. Verbal cues and encouragement needed to maintain upright posture as she tends to lean back while lifting arms. She also performed 3 sets x 10 reps chest press with 2# dowel. In addition, pt performed x30 reps  with 3# digi-flex. TOILETING Daily Assessment     Toileting  Toileting Assistance (FIM Score): 4 (Minimal assistance) (for managing pants and drawstring)  Cues: Physical assistance for pants up   Pt required Min A for managing drawstring of pant and to get pants pulled up all the way in the back as she did not even attempt to perform this portion of the task. MOBILITY/TRANSFERS Daily Assessment      Pt ambulated in gym to/from bathroom with distant supervision and completed stand step transfer to/from [facility] over toilet with Mod I. Assessment: Pt demonstrates appropriate FM strength and coordination to perform clothing management during toileting task, however she declined to attempt. Plan of Care: Continue POC to maximize pt independence and safety performing ADLs and functional transfers/mobility.      Dannielle Rm, OTR  5/29/2017

## 2017-05-29 NOTE — INTERDISCIPLINARY ROUNDS
[facility] PHYSICAL REHABILITATION  [address] [place] [address], [address]    INPATIENT REHABILITATION  PRE-TEAM CONFERENCE SUMMARY     Date of Conference: 5/30/2017    Name: Kaushal Daniels Age / Sex: 62 y.o. / female   CSN: [id number] MRN: [id number]   6 [facility] Date: 5/10/2017 Length of Stay: 19 days     Primary Rehabilitation Diagnosis  1. Impaired Mobility and ADLs  2. Systemic inflammatory response syndrome (SIRS) secondary to:  a. Cellulitis of the right forearm  b. Olecranon bursitis of the right elbow  c. Contusion of left elbow  d.  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 congestive heart failure    Depression    History of back injury    Anxiety    Wears glasses    History of hepatitis C    Sickle cell trait (HCC)    History of acute renal failure    Hypothyroidism    Recurrent genital herpes    Sarcoidosis ([facility] [address]    History pf abscess of right arm    Hypoxemia requiring supplemental oxygen    Abnormal nuclear stress test    History of cellulitis and abscess of hand    History of cellulitis and abscess of foot    Intravenous drug user    History of penicillin allergy    Falls frequently    Gastroesophageal reflux disease with hiatal hernia    Memory difficulty    Mixed connective tissue disease     CKD stage G3a/A1, GFR 45-59 and albumin creatinine ratio <30 mg/g     Acute renal failure superimposed on stage 3 chronic kidney disease    Hypomagnesemia    Acute cystitis without hematuria         Therapy:     FIM SCORES Initial Assessment Weekly Progress Assessment 5/29/2017   Eating Functional Level: 2  Comments: Pt demonstrates ability to scoop eggs using fork and bring them to her mouth with significantly increaed time and effort, however she fatigues easily and needs assistance to feed herself majority of a meal. Pt is able to hold a small cup with her R hand and bring to her mouth to drink. Swallowing     Grooming 1     Bathing 1        Upper Body Dressing Functional Level: 1  Items Applied/Steps Completed: Bra (3 steps), Pullover (4 steps)  Comments: Pt donned bra and t-shirt while sitting up on EOB with SBA for sitting balance and safety. Pt required Total A for threading shirt over BUEs and to pull shirt overhead due to impaired BUE AROM and strength. Lower Body Dressing Functional Level: 1  Items Applied/Steps Completed: Elastic waist pants (3 steps), Sock, left (1 step), Sock, right (1 step)  Comments: Pt requires Total A for managing all aspects of LB dressing at bed level. Pt performed rolling with Max-Total A x1 while second person assisted with donning of brief and pants over buttocks. Pt unable to reach her feet and needs assistance for doffing/donning socks and pants over B feet. Toileting Functional Level: 2  Comments: Pt completed toileting task at bed level utilizing bed pain. She needs the assistance of 2 people in order to perform all aspects of hygiene and clothing management with Max-Total A x1 for rolling.      Bladder - level of assist 2 6   Bladder - accident frequency score 6 6   Bowel - level of assist 3 1   Bowel - accident frequency score 6 2   Toilet Transfer [place] Toilet Transfer Score: 0  Comments: Based on bed to w/c transfer, pt would require the assistance of 2 people for ant and up weightshift, balance, weightshift, RLE advance, and slow controlled descent with stand to sit to complete stand step transfer without AD to/from [facility]. Tub/Shower Transfer [place] Tub or Shower Type: Tub/Shower combination  Tub/Shower Transfer Score: 0  Comments: Pt not safe to perform shower transfer at this time due to impaired strength, balance, and overall activity tolerance.         Comprehension Primary Mode of Comprehension: Auditory  Score: 4 Auditory  5   Expression Primary Mode of Expression: Verbal  Score: 4 Verbal  6   Social Interaction Score: 4 5   Problem Solving Score: 3 4   Memory Score: 3 5     FIM SCORES Initial Assessment Weekly Progress Assessment 5/29/2017   Bed/Chair/Wheelchair Transfers Transfer Type: Lateral with transfer board  Other: stand step txfr without AD/anterior approach  Transfer Assistance : 2 (Maximal assistance)  Sit to Stand Assistance: Maximum assistance (in p-bars, pull to stand)  Car Transfers: Not tested  Car Type: n/a  txfr type: stand step txfr without AD  txfr A: Aries   Bed Mobility Rolling Right 1 (Total assistance)   Rolling Left 1 (Total assistance)   Supine to Sit 1 (Total assistance)   Sit to Stand Maximum assistance (in p-bars, pull to stand)   Sit to Supine 1 (Total assistance)    Rolling Right    Aries   Rolling Left    Aries   Supine to Sit    Aries   Sit to Stand    Aries   Sit to Supine    Aries      Locomotion (W/C) Able to Propel (ft): 10 feet  Functional Level: 1  Curbs/Ramps Assist Required (FIM Score): 0 (Not tested)  Wheelchair Setup Assist Required : 2 (Maximal assistance)  Wheelchair Management: Other (comment) (needs help to manage brakes at this time) Function 6  Setup Assistance: Aries         Locomotion (W/C distance) 10 Feet  278ft   Locomotion (Walk) 1 (Dependent/total assistance)  5(S)      Locomotion (Walk dist.) 0 Feet (ft)  165ft   Steps/Stairs Steps/Stairs Ambulated (#): 0  Level of Assist : 0 (Not tested)  Rail Use: Both  6 6\" steps with S and BHR         Nursing:     Neuro:   A&O x___4_                   Respiratory:   [] WNL   [x] O2   [] LPM ____2__   Other:  Peripheral Vascular:   [x] TEDS present   [] Edema present ____ Grade   Cardiac:   [] WNL   [x] Other  Genitourinary:   [x] continent   [] incontinent   [] rousseau  Abdominal ____5/28/17___ LBM  GI: ___diabetic____ Diet __thin____ Liquids _____ tube feeds  Musculoskeletal: ____ ROM Transfers __w/c___ Assistive Device Used  __minimal__ Level of Assistance  Skin Integumentary:   [] Intact   [x] Not Intact   __________Preventative Measures  Details_____left foot ulcer_________________________________________________________  Pain: [x] Controlled   [] Not Controlled   Pain Meds:   [x] Scheduled   [] PRN        Registered Dietitian / Nutrition:   Patient Vitals for the past 100 hrs:   % Diet Eaten   05/29/17 0844 75 %   05/28/17 1800 80 %   05/28/17 1300 85 %   05/28/17 0930 100 %   05/27/17 1807 100 %   05/27/17 1300 100 %   05/27/17 0930 100 %   05/26/17 0925 100 %   05/25/17 1800 80 %   05/25/17 1309 100 %   05/25/17 0922 100 %     Patient has good appetite and meal intake. Pt with question regarding total calories; clarified with pt that diet order does meet estimated energy needs. Hungry at times after meals    Supplements:          [] Yes   [x] No      Amount of supplement consumed:  not applicable       Intake/Output Summary (Last 24 hours) at 05/29/17 1203  Last data filed at 05/29/17 0844   Gross per 24 hour   Intake              840 ml   Output                0 ml   Net              840 ml                                Last bowel movement:  5/28      Interdisciplinary Team Goals:     1. Goal  PT: Pt will ambulate 165ft Aries while managing o2 tank. Barrier  decreased activity tolerance, ankle and knee joint pain, self limiting    Intervention  gait, txfr, bed mobility and stair training, exercises, balance activities     2. Goal  Encourage pt to attempt to complete all aspects of ADLs before asking for assistance.     Barrier  Self-limiting, Decreased strength, Increased pain    Intervention  ADL training, Therapeutic activity, Therapeutic exercise     3. Goal      Barrier      Intervention       4. Goal  Nursing by discharge oxygen saturations will remain above 93% with activity    Barrier      Intervention  energy conservation skills, keep o2 on during activity     5. Goal  Continue to address dependency versus independent abilities    Barrier  Patient tends to request assist when she is able to do for herself    Intervention  Behavioral redirection     6. Goal  Po intake of meals will continue to meet >75% of patient estimated nutritional needs within the next 7 days. Outcome:  [x] Met/Ongoing    []  Not Met    [] New/Initial Goal     Barrier  none known     Intervention  modified diet; additional portions of vegetable and meat       Disposition / Discharge Planning: Follow-up therapy services:     DME recommendations:     Estimated discharge date:     Discharge Location:           Electronic Signatures:      Signature Date Signed   Physical Therapist    Justin German PT DPT  5/29/17 05/29/2017   Occupational Therapist    Leann Maurer [address]  5/29/2017   Speech Therapist         Recreational Therapist    Juan A Blackmon, CTRS  5/29/17   Nursing    Merline Pinna LPN  [birth year]   Dietitian    Ethan Simeon RD  5/29/17   Clinical Psychologist    Ness Shah, Ph.D. 5/30/17    Physician    Luigi Modi. Verónica Luciano MD  5/29/2017                  The above information has been reviewed with the patient in a language that they can understand. Opportunity for comments and questions has been provided and a signed attestation has been scanned into the \"media tab\" of the EMR.       Patient Signature: ______________________________________________________    Date Signed: __________________________________________________________

## 2017-05-29 NOTE — PROGRESS NOTES
Progress Note    Patient: Modesto Maldonado MRN: [id number]  SSN: [id number]    YOB: 1955  Age: 62 y.o. Sex: female      Admit Date: 5/10/2017    LOS: 19 days     Subjective:     Patient with diabetes s/p SIRS with debilitation. Doing well. Objective:     Vitals:    05/28/17 0739 05/28/17 1600 05/28/17 2005 05/29/17 0733   BP: 114/76 120/82 123/78 122/77   Pulse: 91 83 89 92   Resp: 20 18 18 20   Temp: 97.8 °F (36.6 °C) 98.3 °F (36.8 °C) 99 °F (37.2 °C) 98.9 °F (37.2 °C)   SpO2: 99% 100% 92% 98%   Weight:       Height:            Intake and Output:  Current Shift:    Last three shifts: 05/27 1901 - 05/29 0700  In: 720 [P.O.:720]  Out: -     Physical Exam:   GENERAL: alert, cooperative, no distress, appears stated age  LUNG: clear to auscultation bilaterally  HEART: regular rate and rhythm, S1, S2 normal, no murmur, click, rub or gallop    Lab/Data Review: All lab results for the last 24 hours reviewed. Glucose 95 creatinine 1.55 hct 31.5    Assessment:     Principal Problem:    SIRS (systemic inflammatory response syndrome) ([facility]) (5/2/2017)    Active Problems:    Type 2 diabetes with stage 3 chronic kidney disease GFR 30-59 ([facility]) ()      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 congestive heart failure ()      Overview: Better controlled      Cellulitis of right forearm (5/4/2017)      Olecranon bursitis of right elbow (5/4/2017)      Contusion of left elbow (5/4/2017)      Right Achilles tendinitis (5/2/2017)      Impaired mobility and ADLs (5/4/2017)      Acute renal failure superimposed on stage 3 chronic kidney disease ([facility] [address] (5/15/2017)      Hypomagnesemia (5/22/2017)      Overview: Mg (5/22/2017) = 1. Plan:     Continue present management.     Signed By: Chas Hardy MD     May 29, 2017

## 2017-05-29 NOTE — REHAB NOTE
[facility]  Bedside and Verbal shift change report given to Marli Zavala RN (oncoming nurse) by Faye Vidal LPN   (offgoing nurse). Report included the following information SBAR, Kardex, MAR and Recent Results. Situation:   Code Status: Full Code   Reason for Admission: [address] [facility] Day: 19   Problem List:   Hospital Problems  Date Reviewed: 5/26/2017          Codes Class Noted POA    Hypomagnesemia ICD-10-CM: E83.42  ICD-9-CM: 275.2  5/22/2017 No    Overview Signed 5/22/2017  2:29 PM by Beth Ellis MD     Mg (5/22/2017) = 1.              Acute renal failure superimposed on stage 3 chronic kidney disease (HCC) ICD-10-CM: N17.9, N18.3  ICD-9-CM: 584.9, 585.3  5/15/2017 No        Cellulitis of right forearm ICD-10-CM: L03.113  ICD-9-CM: 682.3  5/4/2017 Yes        Olecranon bursitis of right elbow ICD-10-CM: M70.21  ICD-9-CM: 726.33  5/4/2017 Yes        Contusion of left elbow ICD-10-CM: S50.02XA  ICD-9-CM: 923.11  5/4/2017 Yes        Impaired mobility and ADLs ICD-10-CM: Z74.09  ICD-9-CM: 799.89  5/4/2017 Yes        * (Principal)SIRS (systemic inflammatory response syndrome) ([facility] [address] ICD-10-CM: R65.10  ICD-9-CM: 995.90  5/2/2017 Yes        Right Achilles tendinitis ICD-10-CM: M76.61  ICD-9-CM: 726.71  5/2/2017 Yes        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 congestive heart failure (Chronic) ICD-10-CM: I13.10, N18.3  ICD-9-CM: 404.10, 585.3  Unknown Yes    Overview Signed 4/23/2013 10:02 AM by Raúl Barraza     Better controlled             Type 2 diabetes with stage 3 chronic kidney disease GFR 30-59 (HCC) (Chronic) ICD-10-CM: E11.22, N18.3  ICD-9-CM: 250.40, 585.3  Unknown Yes              Background:   Past Medical History:   Past Medical History:   Diagnosis Date    Abnormally low high density lipoprotein (HDL) cholesterol with hypertriglyceridemia     Lipid profile (11/6/2016) showed , , HDL 38, LDL 37   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 congestive heart failure     Better controlled     Bipolar affective disorder ([address] 12/5/2012    Cellulitis of right forearm 5/4/2017    Chronic back pain     Chronic obstructive pulmonary disease (COPD) (HCC)     SOB, on paula O2 Recent admission with psychosis     CKD stage G3a/A1, GFR 45-59 and albumin creatinine ratio <30 mg/g 5/11/2017    Urine microalbumin/Creatinine ratio (5/11/2017) = 9 mg/g    Contusion of left elbow 5/4/2017    Depression     Diabetic neuropathy associated with type 2 diabetes mellitus (HCC)     Diastolic dysfunction without heart failure     Stable on diuretics     Falls frequently     Gastroesophageal reflux disease with hiatal hernia     Generalized osteoarthritis of multiple sites     History of acute renal failure 5/31/2013    History of back injury     jumped out of second story window     History of hepatitis C     treated    History of penicillin allergy     Hypothyroidism     Hypoxemia requiring supplemental oxygen 12/29/2014    Intravenous drug user 5/2/2017    Memory difficulty     Mixed connective tissue disease ([address] 5/10/2017    Obesity, Class I, BMI 30-34.9     Olecranon bursitis of right elbow 5/4/2017    Recurrent genital herpes 5/31/2013    Right Achilles tendinitis 5/2/2017    Sarcoidosis ([address]     Sickle cell trait ([address]     Type 2 diabetes with stage 3 chronic kidney disease GFR 30-59 ([address]     Venous insufficiency     Wears glasses       Patient taking anticoagulants yes    Patient has a defibrillator: no     Assessment:   Changes in Assessment throughout shift: no     Patient has central line: no Reasons if yes: Insertion date: Last dressing date:   Patient has Renae Cath: no Reasons if yes:     Insertion date:     Last Vitals:     Vitals:    05/28/17 0739 05/28/17 1600 05/28/17 2005 05/29/17 0733   BP: 114/76 120/82 123/78 122/77   Pulse: 91 83 89 92   Resp: 20 18 18 20   Temp: 97.8 °F (36.6 °C) 98.3 °F (36.8 °C) 99 °F (37.2 °C) 98.9 °F (37.2 °C)   SpO2: 99% 100% 92% 98%   Weight:       Height:       LMP: 11/25/2012        PAIN    Pain Assessment    Pain Intensity 1: 5 (05/29/17 1200) Pain Intensity 1: 2 (12/29/14 1105)    Pain Location 1: Generalized Pain Location 1: Abdomen    Pain Intervention(s) 1: Medication (see MAR) Pain Intervention(s) 1: Medication (see MAR)  Patient Stated Pain Goal: 0 Patient Stated Pain Goal: 0  o Intervention effective: yes   o Other actions taken for pain: repositioning     Skin Assessment  Skin color Skin Color: Appropriate for ethnicity  Condition/Temperature Skin Condition/Temp: Dry, Warm  Integrity Skin Integrity: Wound (add Wound LDA)  Turgor Turgor: Non-tenting  Weekly Pressure Ulcer Documentation  Pressure  Injury Documentation: No Pressure Injury Noted-Pressure Ulcer Prevention Initiated  Wound Prevention & Protection Methods  Orientation of wound Orientation of Wound Prevention: Posterior  Location of Prevention Location of Wound Prevention: Buttocks, Heel, Sacrum/Coccyx  Dressing Present Dressing Present : No  Dressing Status    Wound Offloading Wound Offloading (Prevention Methods): Bed, pressure redistribution/air     INTAKE/OUPUT    Date 05/28/17 0700 - 05/29/17 0659 05/29/17 0700 - 05/30/17 0659   Shift 2572-8748 1124-5974 24 Hour Total 2678-6130 1564-6150 24 Hour Total   I  N  T  A  K  E   P. O. 720  720 360  360      P. O. 720  720 360  360    Shift Total  (mL/kg) 720  (7.6)  720  (7.6) 360  (3.8)  360  (3.8)   O  U  T  P  U  T   Urine  (mL/kg/hr)            Urine Occurrence(s) 4 x 5 x 9 x 1 x  1 x    Stool            Stool Occurrence(s) 1 x 0 x 1 x 0 x  0 x    Shift Total  (mL/kg)           720 360  360   Weight (kg) 95.3 95.3 95.3 95.3 95.3 95.3       Recommendations:  1. Patient needs and requests: none    2. Diet: diabetic    3. Pending tests/procedures: am labs     4.  Functional Level/Equipment: assist x 2/wheelchair    5. Estimated Discharge Date:5/31/17 tbd Posted on Whiteboard in Patients Room: no     [facility]    A safety check occurred in the patient's room between off going nurse and oncoming nurse listed above. The safety check included the below items  Area Items   H  High Alert Medications - Verify all high alert medication drips (heparin, PCA, etc.)   E  Equipment - Suction is set up for ALL patients (with lashay)  - Red plugs utilized for all equipment (IV pumps, etc.)  - WOWs wiped down at end of shift.  - Room stocked with oxygen, suction, and other unit-specific supplies   A  Alarms - Bed alarm is set for fall risk patients  - Ensure chair alarm is in place and activated if patient is up in a chair   L  Lines - Check IV for any infiltration  - Renae bag is empty if patient has a Renae   - Tubing and IV bags are labeled   S  Safety   - Room is clean, patient is clean, and equipment is clean. - Hallways are clear from equipment besides carts. - Fall bracelet on for fall risk patients  - Ensure room is clear and free of clutter  - Suction is set up for ALL patients (with lashay)  - Hallways are clear from equipment besides carts.    - Isolation precautions followed, supplies available outside room, sign posted   Faye Marcy, OLIVIER

## 2017-05-29 NOTE — REHAB NOTE
[facility]  Bedside and Verbal shift change report given to Matt Mason (oncoming nurse) by Moe Damon, RN   (offgoing nurse). Report included the following information SBAR, Kardex, MAR and Recent Results. Situation:   Code Status: Full Code   Reason for Admission: [facility] Day: 19   Problem List:   Hospital Problems  Date Reviewed: 5/26/2017          Codes Class Noted POA    Hypomagnesemia ICD-10-CM: E83.42  ICD-9-CM: 275.2  5/22/2017 No    Overview Signed 5/22/2017  2:29 PM by Mariaa Galdamez MD     Mg (5/22/2017) = 1.              Acute renal failure superimposed on stage 3 chronic kidney disease (HCC) ICD-10-CM: N17.9, N18.3  ICD-9-CM: 584.9, 585.3  5/15/2017 No        Cellulitis of right forearm ICD-10-CM: L03.113  ICD-9-CM: 682.3  5/4/2017 Yes        Olecranon bursitis of right elbow ICD-10-CM: M70.21  ICD-9-CM: 726.33  5/4/2017 Yes        Contusion of left elbow ICD-10-CM: S50.02XA  ICD-9-CM: 923.11  5/4/2017 Yes        Impaired mobility and ADLs ICD-10-CM: Z74.09  ICD-9-CM: 799.89  5/4/2017 Yes        * (Principal)SIRS (systemic inflammatory response syndrome) ([facility] [address] ICD-10-CM: R65.10  ICD-9-CM: 995.90  5/2/2017 Yes        Right Achilles tendinitis ICD-10-CM: M76.61  ICD-9-CM: 726.71  5/2/2017 Yes        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 congestive heart failure (Chronic) ICD-10-CM: I13.10, N18.3  ICD-9-CM: 404.10, 585.3  Unknown Yes    Overview Signed 4/23/2013 10:02 AM by Lex Washington     Better controlled             Type 2 diabetes with stage 3 chronic kidney disease GFR 30-59 (HCC) (Chronic) ICD-10-CM: E11.22, N18.3  ICD-9-CM: 250.40, 585.3  Unknown Yes              Background:   Past Medical History:   Past Medical History:   Diagnosis Date    Abnormally low high density lipoprotein (HDL) cholesterol with hypertriglyceridemia     Lipid profile (11/6/2016) showed , , HDL 38, LDL 37   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 congestive heart failure     Better controlled     Bipolar affective disorder ([address] 12/5/2012    Cellulitis of right forearm 5/4/2017    Chronic back pain     Chronic obstructive pulmonary disease (COPD) (HCC)     SOB, on paula O2 Recent admission with psychosis     CKD stage G3a/A1, GFR 45-59 and albumin creatinine ratio <30 mg/g 5/11/2017    Urine microalbumin/Creatinine ratio (5/11/2017) = 9 mg/g    Contusion of left elbow 5/4/2017    Depression     Diabetic neuropathy associated with type 2 diabetes mellitus (HCC)     Diastolic dysfunction without heart failure     Stable on diuretics     Falls frequently     Gastroesophageal reflux disease with hiatal hernia     Generalized osteoarthritis of multiple sites     History of acute renal failure 5/31/2013    History of back injury     jumped out of second story window     History of hepatitis C     treated    History of penicillin allergy     Hypothyroidism     Hypoxemia requiring supplemental oxygen 12/29/2014    Intravenous drug user 5/2/2017    Memory difficulty     Mixed connective tissue disease ([address] 5/10/2017    Obesity, Class I, BMI 30-34.9     Olecranon bursitis of right elbow 5/4/2017    Recurrent genital herpes 5/31/2013    Right Achilles tendinitis 5/2/2017    Sarcoidosis ([address]     Sickle cell trait ([address]     Type 2 diabetes with stage 3 chronic kidney disease GFR 30-59 ([address]     Venous insufficiency     Wears glasses       Patient taking anticoagulants yes    Patient has a defibrillator: no     Assessment:   Changes in Assessment throughout shift: no     Patient has central line: no Reasons if yes: Insertion date: Last dressing date:   Patient has Renae Cath: no Reasons if yes:     Insertion date:     Last Vitals:     Vitals:    05/27/17 1930 05/28/17 0739 05/28/17 1600 05/28/17 2005   BP: 117/74 114/76 120/82 123/78   Pulse: 89 91 83 89   Resp: 19 20 18 18   Temp: 98.8 °F (37.1 °C) 97.8 °F (36.6 °C) 98.3 °F (36.8 °C) 99 °F (37.2 °C)   SpO2: 94% 99% 100% 92%   Weight:       Height:       LMP: 11/25/2012        PAIN    Pain Assessment    Pain Intensity 1: 0 (05/29/17 0000) Pain Intensity 1: 2 (12/29/14 1105)    Pain Location 1: Generalized Pain Location 1: Abdomen    Pain Intervention(s) 1: Medication (see MAR) Pain Intervention(s) 1: Medication (see MAR)  Patient Stated Pain Goal: 0 Patient Stated Pain Goal: 0  o Intervention effective: yes   o Other actions taken for pain: repositioning     Skin Assessment  Skin color Skin Color: Appropriate for ethnicity  Condition/Temperature Skin Condition/Temp: Dry, Warm  Integrity Skin Integrity: Wound (add Wound LDA)  Turgor Turgor: Non-tenting  Weekly Pressure Ulcer Documentation  Pressure  Injury Documentation: No Pressure Injury Noted-Pressure Ulcer Prevention Initiated  Wound Prevention & Protection Methods  Orientation of wound Orientation of Wound Prevention: Posterior  Location of Prevention Location of Wound Prevention: Buttocks, Sacrum/Coccyx  Dressing Present Dressing Present : No  Dressing Status    Wound Offloading Wound Offloading (Prevention Methods): Bed, pressure redistribution/air     INTAKE/OUPUT    Date 05/28/17 0700 - 05/29/17 0659 05/29/17 0700 - 05/30/17 0659   Shift 3550-2046 6836-2596 24 Hour Total 4420-5286 3994-3353 24 Hour Total   I  N  T  A  K  E   P. O. 720  720         P. O. 720  720       Shift Total  (mL/kg) 720  (7.6)  720  (7.6)      O  U  T  P  U  T   Urine  (mL/kg/hr)            Urine Occurrence(s) 4 x 5 x 9 x       Stool            Stool Occurrence(s) 1 x 0 x 1 x       Shift Total  (mL/kg)           720      Weight (kg) 95.3 95.3 95.3 95.3 95.3 95.3       Recommendations:  1. Patient needs and requests: none    2. Diet: diabetic    3. Pending tests/procedures: am labs     4. Functional Level/Equipment: assist x 2/wheelchair    5.  Estimated Discharge Date:5/31/17 tbd Posted on Whiteboard in [facility]: no     HEALS Safety Check    A safety check occurred in the patient's room between off going nurse and oncoming nurse listed above. The safety check included the below items  Area Items   H  High Alert Medications - Verify all high alert medication drips (heparin, PCA, etc.)   E  Equipment - Suction is set up for ALL patients (with lashay)  - Red plugs utilized for all equipment (IV pumps, etc.)  - WOWs wiped down at end of shift.  - Room stocked with oxygen, suction, and other unit-specific supplies   A  Alarms - Bed alarm is set for fall risk patients  - Ensure chair alarm is in place and activated if patient is up in a chair   L  Lines - Check IV for any infiltration  - Renae bag is empty if patient has a Renae   - Tubing and IV bags are labeled   S  Safety   - Room is clean, patient is clean, and equipment is clean. - Hallways are clear from equipment besides carts. - Fall bracelet on for fall risk patients  - Ensure room is clear and free of clutter  - Suction is set up for ALL patients (with lashay)  - Hallways are clear from equipment besides carts.    - Isolation precautions followed, supplies available outside room, sign posted   Skylar Alicea RN

## 2017-05-30 LAB
GLUCOSE BLD STRIP.AUTO-MCNC: 132 MG/DL (ref 70–110)
GLUCOSE BLD STRIP.AUTO-MCNC: 181 MG/DL (ref 70–110)
GLUCOSE BLD STRIP.AUTO-MCNC: 221 MG/DL (ref 70–110)
GLUCOSE BLD STRIP.AUTO-MCNC: 264 MG/DL (ref 70–110)
GLUCOSE BLD STRIP.AUTO-MCNC: 285 MG/DL (ref 70–110)

## 2017-05-30 PROCEDURE — 74011250637 HC RX REV CODE- 250/637: Performed by: SPECIALIST

## 2017-05-30 PROCEDURE — 97530 THERAPEUTIC ACTIVITIES: CPT

## 2017-05-30 PROCEDURE — 97535 SELF CARE MNGMENT TRAINING: CPT

## 2017-05-30 PROCEDURE — 74011000258 HC RX REV CODE- 258: Performed by: INTERNAL MEDICINE

## 2017-05-30 PROCEDURE — 74011636637 HC RX REV CODE- 636/637: Performed by: INTERNAL MEDICINE

## 2017-05-30 PROCEDURE — 97116 GAIT TRAINING THERAPY: CPT

## 2017-05-30 PROCEDURE — 74011250636 HC RX REV CODE- 250/636: Performed by: INTERNAL MEDICINE

## 2017-05-30 PROCEDURE — 74011250637 HC RX REV CODE- 250/637: Performed by: INTERNAL MEDICINE

## 2017-05-30 PROCEDURE — 82962 GLUCOSE BLOOD TEST: CPT

## 2017-05-30 PROCEDURE — 65310000000 HC RM PRIVATE REHAB

## 2017-05-30 PROCEDURE — 74011250637 HC RX REV CODE- 250/637: Performed by: FAMILY MEDICINE

## 2017-05-30 RX ORDER — INSULIN GLARGINE 100 [IU]/ML
30 INJECTION, SOLUTION SUBCUTANEOUS
Status: DISCONTINUED | OUTPATIENT
Start: 2017-05-30 | End: 2017-05-31 | Stop reason: HOSPADM

## 2017-05-30 RX ORDER — INSULIN GLARGINE 100 [IU]/ML
70 INJECTION, SOLUTION SUBCUTANEOUS
Status: DISCONTINUED | OUTPATIENT
Start: 2017-05-31 | End: 2017-05-31 | Stop reason: HOSPADM

## 2017-05-30 RX ORDER — AMOXICILLIN 250 MG
2 CAPSULE ORAL EVERY EVENING
Status: DISCONTINUED | OUTPATIENT
Start: 2017-05-30 | End: 2017-05-31 | Stop reason: HOSPADM

## 2017-05-30 RX ORDER — LANOLIN ALCOHOL/MO/W.PET/CERES
400 CREAM (GRAM) TOPICAL 2 TIMES DAILY
Status: DISCONTINUED | OUTPATIENT
Start: 2017-05-30 | End: 2017-05-31 | Stop reason: HOSPADM

## 2017-05-30 RX ADMIN — OXYCODONE HYDROCHLORIDE AND ACETAMINOPHEN 1 TABLET: 5; 325 TABLET ORAL at 12:25

## 2017-05-30 RX ADMIN — TOLTERODINE TARTRATE 1 MG: 1 TABLET, FILM COATED ORAL at 08:09

## 2017-05-30 RX ADMIN — ARIPIPRAZOLE 10 MG: 10 TABLET ORAL at 08:10

## 2017-05-30 RX ADMIN — DICLOFENAC SODIUM 2 G: 10 GEL TOPICAL at 19:12

## 2017-05-30 RX ADMIN — FEBUXOSTAT 40 MG: 40 TABLET ORAL at 08:10

## 2017-05-30 RX ADMIN — DIVALPROEX SODIUM 250 MG: 250 TABLET, DELAYED RELEASE ORAL at 21:52

## 2017-05-30 RX ADMIN — ASPIRIN 81 MG CHEWABLE TABLET 81 MG: 81 TABLET CHEWABLE at 08:10

## 2017-05-30 RX ADMIN — Medication 800 MG: at 08:00

## 2017-05-30 RX ADMIN — TRAZODONE HYDROCHLORIDE 100 MG: 50 TABLET ORAL at 21:52

## 2017-05-30 RX ADMIN — OXYCODONE HYDROCHLORIDE AND ACETAMINOPHEN 1 TABLET: 5; 325 TABLET ORAL at 08:10

## 2017-05-30 RX ADMIN — HEPARIN SODIUM 5000 UNITS: 5000 INJECTION, SOLUTION INTRAVENOUS; SUBCUTANEOUS at 21:53

## 2017-05-30 RX ADMIN — INSULIN GLARGINE 65 UNITS: 100 INJECTION, SOLUTION SUBCUTANEOUS at 05:47

## 2017-05-30 RX ADMIN — CEFTRIAXONE 1 G: 1 INJECTION, POWDER, FOR SOLUTION INTRAMUSCULAR; INTRAVENOUS at 18:19

## 2017-05-30 RX ADMIN — HEPARIN SODIUM 5000 UNITS: 5000 INJECTION, SOLUTION INTRAVENOUS; SUBCUTANEOUS at 05:46

## 2017-05-30 RX ADMIN — DICLOFENAC SODIUM 2 G: 10 GEL TOPICAL at 09:00

## 2017-05-30 RX ADMIN — SERTRALINE HYDROCHLORIDE 50 MG: 50 TABLET ORAL at 08:10

## 2017-05-30 RX ADMIN — Medication 400 MG: at 18:56

## 2017-05-30 RX ADMIN — INSULIN LISPRO 6 UNITS: 100 INJECTION, SOLUTION INTRAVENOUS; SUBCUTANEOUS at 12:28

## 2017-05-30 RX ADMIN — NEPHROCAP 1 CAPSULE: 1 CAP ORAL at 08:10

## 2017-05-30 RX ADMIN — GABAPENTIN 300 MG: 300 CAPSULE ORAL at 05:47

## 2017-05-30 RX ADMIN — STANDARDIZED SENNA CONCENTRATE AND DOCUSATE SODIUM 2 TABLET: 8.6; 5 TABLET, FILM COATED ORAL at 08:10

## 2017-05-30 RX ADMIN — INSULIN LISPRO 4 UNITS: 100 INJECTION, SOLUTION INTRAVENOUS; SUBCUTANEOUS at 17:07

## 2017-05-30 RX ADMIN — TOLTERODINE TARTRATE 1 MG: 1 TABLET, FILM COATED ORAL at 18:55

## 2017-05-30 RX ADMIN — STANDARDIZED SENNA CONCENTRATE AND DOCUSATE SODIUM 2 TABLET: 8.6; 5 TABLET, FILM COATED ORAL at 18:56

## 2017-05-30 RX ADMIN — ROSUVASTATIN CALCIUM 5 MG: 5 TABLET ORAL at 21:52

## 2017-05-30 RX ADMIN — CHOLECALCIFEROL TAB 25 MCG (1000 UNIT) 1000 UNITS: 25 TAB at 08:10

## 2017-05-30 RX ADMIN — INSULIN GLARGINE 30 UNITS: 100 INJECTION, SOLUTION SUBCUTANEOUS at 18:57

## 2017-05-30 RX ADMIN — DOCUSATE SODIUM 100 MG: 100 CAPSULE, LIQUID FILLED ORAL at 08:10

## 2017-05-30 RX ADMIN — DIPHENHYDRAMINE HYDROCHLORIDE 25 MG: 25 CAPSULE ORAL at 21:52

## 2017-05-30 RX ADMIN — FAMOTIDINE 20 MG: 20 TABLET ORAL at 08:10

## 2017-05-30 RX ADMIN — HEPARIN SODIUM 5000 UNITS: 5000 INJECTION, SOLUTION INTRAVENOUS; SUBCUTANEOUS at 14:12

## 2017-05-30 RX ADMIN — DICLOFENAC SODIUM 2 G: 10 GEL TOPICAL at 13:00

## 2017-05-30 RX ADMIN — OXYCODONE HYDROCHLORIDE AND ACETAMINOPHEN 1 TABLET: 5; 325 TABLET ORAL at 17:05

## 2017-05-30 RX ADMIN — OXYCODONE HYDROCHLORIDE AND ACETAMINOPHEN 1 TABLET: 5; 325 TABLET ORAL at 04:12

## 2017-05-30 RX ADMIN — LEVOTHYROXINE SODIUM 100 MCG: 100 TABLET ORAL at 06:43

## 2017-05-30 RX ADMIN — GABAPENTIN 300 MG: 300 CAPSULE ORAL at 18:55

## 2017-05-30 NOTE — INTERDISCIPLINARY ROUNDS
[facility] PHYSICAL REHABILITATION  [address] [place] [address], [address]    INPATIENT REHABILITATION  TEAM CONFERENCE SUMMARY     Date of Conference: 5/30/2017    Name: Heather Sifuentes Age / Sex: 62 y.o. / female   CSN: [id number] MRN: [id number]   [facility] Date: 5/10/2017 Length of Stay: 20 days     Primary Rehabilitation Diagnosis  1. Impaired Mobility and ADLs  2. Systemic inflammatory response syndrome (SIRS) secondary to:   a. Cellulitis of the right forearm   b. Olecranon bursitis of the right elbow   c. Contusion of left elbow   d.  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 congestive heart failure    Depression    History of back injury    Anxiety    Wears glasses    History of hepatitis C    Sickle cell trait (HCC)    History of acute renal failure    Hypothyroidism    Recurrent genital herpes    Sarcoidosis ([facility] [address]    History pf abscess of right arm    Hypoxemia requiring supplemental oxygen    Abnormal nuclear stress test    History of cellulitis and abscess of hand    History of cellulitis and abscess of foot    Intravenous drug user    History of penicillin allergy    Falls frequently    Gastroesophageal reflux disease with hiatal hernia    Memory difficulty    Mixed connective tissue disease     CKD stage G3a/A1, GFR 45-59 and albumin creatinine ratio <30 mg/g     Acute renal failure superimposed on stage 3 chronic kidney disease    Hypomagnesemia    Acute cystitis without hematuria         Therapy:     FIM SCORES Initial Assessment Weekly Progress Assessment 5/30/2017   Eating Functional Level: 2  Comments: Pt demonstrates ability to scoop eggs using fork and bring them to her mouth with significantly increaed time and effort, however she fatigues easily and needs assistance to feed herself majority of a meal. Pt is able to hold a small cup with her R hand and bring to her mouth to drink. Swallowing     Grooming 1     Bathing 1        Upper Body Dressing Functional Level: 1  Items Applied/Steps Completed: Bra (3 steps), Pullover (4 steps)  Comments: Pt donned bra and t-shirt while sitting up on EOB with SBA for sitting balance and safety. Pt required Total A for threading shirt over BUEs and to pull shirt overhead due to impaired BUE AROM and strength. Lower Body Dressing Functional Level: 1  Items Applied/Steps Completed: Elastic waist pants (3 steps), Sock, left (1 step), Sock, right (1 step)  Comments: Pt requires Total A for managing all aspects of LB dressing at bed level. Pt performed rolling with Max-Total A x1 while second person assisted with donning of brief and pants over buttocks. Pt unable to reach her feet and needs assistance for doffing/donning socks and pants over B feet. Toileting Functional Level: 2  Comments: Pt completed toileting task at bed level utilizing bed pain. She needs the assistance of 2 people in order to perform all aspects of hygiene and clothing management with Max-Total A x1 for rolling.      Bladder - level of assist 2 6   Bladder - accident frequency score 6 6   Bowel - level of assist 3 1   Bowel - accident frequency score 6     Toilet Transfer Harper Toilet Transfer Score: 0  Comments: Based on bed to w/c transfer, pt would require the assistance of 2 people for ant and up weightshift, balance, weightshift, RLE advance, and slow controlled descent with stand to sit to complete stand step transfer without AD to/from [facility]. Tub/Shower Transfer Hayes Tub or Shower Type: Tub/Shower combination  Tub/Shower Transfer Score: 0  Comments: Pt not safe to perform shower transfer at this time due to impaired strength, balance, and overall activity tolerance.         Comprehension Primary Mode of Comprehension: Auditory  Score: 4 Auditory  5   Expression Primary Mode of Expression: Verbal  Score: 4 Verbal  6   Social Interaction Score: 4 5   Problem Solving Score: 3 4   Memory Score: 3 5     FIM SCORES Initial Assessment Weekly Progress Assessment 5/30/2017   Bed/Chair/Wheelchair Transfers Transfer Type: Lateral with transfer board  Other: stand step txfr without AD/anterior approach  Transfer Assistance : 2 (Maximal assistance)  Sit to Stand Assistance: Maximum assistance (in p-bars, pull to stand)  Car Transfers: Not tested  Car Type: n/a  txfr type: stand step txfr without AD  txfr A: Aries   Bed Mobility Rolling Right 1 (Total assistance)   Rolling Left 1 (Total assistance)   Supine to Sit 1 (Total assistance)   Sit to Stand Maximum assistance (in p-bars, pull to stand)   Sit to Supine 1 (Total assistance)    Rolling Right    Aries   Rolling Left    Aries   Supine to Sit    Aries   Sit to Stand    Aries   Sit to Supine    Aries      Locomotion (W/C) Able to Propel (ft): 10 feet  Functional Level: 1  Curbs/Ramps Assist Required (FIM Score): 0 (Not tested)  Wheelchair Setup Assist Required : 2 (Maximal assistance)  Wheelchair Management: Other (comment) (needs help to manage brakes at this time) Function    Setup Assistance: Aries         Locomotion (W/C distance) 10 Feet  278ft   Locomotion (Walk) 1 (Dependent/total assistance)  5(S)      Locomotion (Walk dist.) 0 Feet (ft)  165ft   Steps/Stairs Steps/Stairs Ambulated (#): 0  Level of Assist : 0 (Not tested)  Rail Use: Both  6 6\" steps with S and BHR         Nursing:     Neuro:   A&O x___4_                   Respiratory:   [] WNL   [x] O2   [] LPM ____2__   Other:  Peripheral Vascular:   [x] TEDS present   [] Edema present ____ Grade   Cardiac:   [] WNL   [x] Other  Genitourinary:   [x] continent   [] incontinent   [] rousseau  Abdominal ____5/28/17___ LBM  GI: ___diabetic____ Diet __thin____ Liquids _____ tube feeds  Musculoskeletal: ____ ROM Transfers __w/c___ Assistive Device Used  __minimal__ Level of Assistance  Skin Integumentary:   [] Intact   [x] Not Intact   __________Preventative Measures  Details_____left foot ulcer_________________________________________________________  Pain: [x] Controlled   [] Not Controlled   Pain Meds:   [x] Scheduled   [] PRN        Registered Dietitian / Nutrition:     Patient Vitals for the past 100 hrs:   % Diet Eaten   05/30/17 1000 100 %   05/29/17 1843 100 %   05/29/17 1337 100 %   05/29/17 0844 75 %   05/28/17 1800 80 %   05/28/17 1300 85 %   05/28/17 0930 100 %   05/27/17 1807 100 %   05/27/17 1300 100 %   05/27/17 0930 100 %   05/26/17 0925 100 %     Patient has good appetite and meal intake. Pt with question regarding total calories; clarified with pt that diet order does meet estimated energy needs. Hungry at times after meals    Supplements:          [] Yes   [x] No      Amount of supplement consumed:  not applicable       Intake/Output Summary (Last 24 hours) at 05/30/17 1148  Last data filed at 05/30/17 1000   Gross per 24 hour   Intake             1200 ml   Output                0 ml   Net             1200 ml                                Last bowel movement:  5/28      Interdisciplinary Team Goals:     1. Goal  PT: Pt will ambulate 165ft Aries while managing o2 tank. Barrier  decreased activity tolerance, ankle and knee joint pain, self limiting    Intervention  gait, txfr, bed mobility and stair training, exercises, balance activities     2. Goal  Encourage pt to attempt to complete all aspects of ADLs before asking for assistance.     Barrier  Self-limiting, Decreased strength, Increased pain    Intervention  ADL training, Therapeutic activity, Therapeutic exercise     3. Goal      Barrier      Intervention       4. Goal  Nursing by discharge oxygen saturations will remain above 93% with activity    Barrier      Intervention  energy conservation skills, keep o2 on during activity     5. Goal  Continue to address dependency versus independent abilities    Barrier  Patient tends to request assist when she is able to do for herself    Intervention  Behavioral redirection     6. Goal  Po intake of meals will continue to meet >75% of patient estimated nutritional needs within the next 7 days. Outcome:  [x] Met/Ongoing    []  Not Met    [] New/Initial Goal     Barrier  none known     Intervention  modified diet; additional portions of vegetable and meat       Disposition / Discharge Planning: Follow-up therapy services:     DME recommendations:     Estimated discharge date:     Discharge Location:           Interdisciplinary team rounds were held this AM with the following team members:       Name   Physical Therapist    Sylvia Conway PT DPT   Occupational Therapist    Ramón Purcell OTR   Speech Therapist       Recreational Therapist    Lucretia Spurling, CTRS   Nursing       Dietitian    Estuardo Lew RD   Clinical Psychologist    Samanta Moreno, Ph.D.   Physician    Raymond Berman.  Sonal Noel MD       DARIA Shah       Signed:  Vida Allan MD    May 30, 2017

## 2017-05-30 NOTE — REHAB NOTE
SHIFT CHANGE NOTE FOR [facility]  Bedside and Verbal shift change report given to Adan David (oncoming nurse) by Geraldine Pearson RN   (offgoing nurse). Report included the following information SBAR, Kardex, MAR and Recent Results. Situation:   Code Status: Full Code   Reason for Admission: [facility] Day: 20   Problem List:   Hospital Problems  Date Reviewed: 5/26/2017          Codes Class Noted POA    Hypomagnesemia ICD-10-CM: E83.42  ICD-9-CM: 275.2  5/22/2017 No    Overview Signed 5/22/2017  2:29 PM by Erwin Doyle MD     Mg (5/22/2017) = 1.              Acute renal failure superimposed on stage 3 chronic kidney disease (HCC) ICD-10-CM: N17.9, N18.3  ICD-9-CM: 584.9, 585.3  5/15/2017 No        Cellulitis of right forearm ICD-10-CM: L03.113  ICD-9-CM: 682.3  5/4/2017 Yes        Olecranon bursitis of right elbow ICD-10-CM: M70.21  ICD-9-CM: 726.33  5/4/2017 Yes        Contusion of left elbow ICD-10-CM: S50.02XA  ICD-9-CM: 923.11  5/4/2017 Yes        Impaired mobility and ADLs ICD-10-CM: Z74.09  ICD-9-CM: 799.89  5/4/2017 Yes        * (Principal)SIRS (systemic inflammatory response syndrome) ([facility] [address] ICD-10-CM: R65.10  ICD-9-CM: 995.90  5/2/2017 Yes        Right Achilles tendinitis ICD-10-CM: M76.61  ICD-9-CM: 726.71  5/2/2017 Yes        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 congestive heart failure (Chronic) ICD-10-CM: I13.10, N18.3  ICD-9-CM: 404.10, 585.3  Unknown Yes    Overview Signed 4/23/2013 10:02 AM by Feli Gasca     Better controlled             Type 2 diabetes with stage 3 chronic kidney disease GFR 30-59 (HCC) (Chronic) ICD-10-CM: E11.22, N18.3  ICD-9-CM: 250.40, 585.3  Unknown Yes              Background:   Past Medical History:   Past Medical History:   Diagnosis Date    Abnormally low high density lipoprotein (HDL) cholesterol with hypertriglyceridemia     Lipid profile (11/6/2016) showed , , HDL 38, LDL 37   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 congestive heart failure     Better controlled     Bipolar affective disorder ([address] 12/5/2012    Cellulitis of right forearm 5/4/2017    Chronic back pain     Chronic obstructive pulmonary disease (COPD) (HCC)     SOB, on paula O2 Recent admission with psychosis     CKD stage G3a/A1, GFR 45-59 and albumin creatinine ratio <30 mg/g 5/11/2017    Urine microalbumin/Creatinine ratio (5/11/2017) = 9 mg/g    Contusion of left elbow 5/4/2017    Depression     Diabetic neuropathy associated with type 2 diabetes mellitus (HCC)     Diastolic dysfunction without heart failure     Stable on diuretics     Falls frequently     Gastroesophageal reflux disease with hiatal hernia     Generalized osteoarthritis of multiple sites     History of acute renal failure 5/31/2013    History of back injury     jumped out of second story window     History of hepatitis C     treated    History of penicillin allergy     Hypothyroidism     Hypoxemia requiring supplemental oxygen 12/29/2014    Intravenous drug user 5/2/2017    Memory difficulty     Mixed connective tissue disease ([address] 5/10/2017    Obesity, Class I, BMI 30-34.9     Olecranon bursitis of right elbow 5/4/2017    Recurrent genital herpes 5/31/2013    Right Achilles tendinitis 5/2/2017    Sarcoidosis ([address]     Sickle cell trait ([address]     Type 2 diabetes with stage 3 chronic kidney disease GFR 30-59 ([address]     Venous insufficiency     Wears glasses       Patient taking anticoagulants yes    Patient has a defibrillator: no     Assessment:   Changes in Assessment throughout shift: no     Patient has central line: no Reasons if yes: Insertion date: Last dressing date:   Patient has Renae Cath: no Reasons if yes:     Insertion date:     Last Vitals:     Vitals:    05/28/17 2005 05/29/17 0733 05/29/17 1600 05/29/17 1930   BP: 123/78 122/77 112/69 107/70   Pulse: 89 92 89 90   Resp: 18 20 19 22   Temp: 99 °F (37.2 °C) 98.9 °F (37.2 °C) 97.9 °F (36.6 °C) 99 °F (37.2 °C)   SpO2: 92% 98% 90% 93%   Weight:       Height:       LMP: 11/25/2012        PAIN    Pain Assessment    Pain Intensity 1: 0 (05/30/17 0648) Pain Intensity 1: 2 (12/29/14 1105)    Pain Location 1: Generalized Pain Location 1: Abdomen    Pain Intervention(s) 1: Medication (see MAR) Pain Intervention(s) 1: Medication (see MAR)  Patient Stated Pain Goal: 0 Patient Stated Pain Goal: 0  o Intervention effective: yes   o Other actions taken for pain: repositioning     Skin Assessment  Skin color Skin Color: Appropriate for ethnicity  Condition/Temperature Skin Condition/Temp: Dry, Warm  Integrity Skin Integrity: Wound (add Wound LDA)  Turgor Turgor: Non-tenting  Weekly Pressure Ulcer Documentation  Pressure  Injury Documentation: No Pressure Injury Noted-Pressure Ulcer Prevention Initiated  Wound Prevention & Protection Methods  Orientation of wound Orientation of Wound Prevention: Posterior  Location of Prevention Location of Wound Prevention: Buttocks, Heel, Sacrum/Coccyx  Dressing Present Dressing Present : No  Dressing Status    Wound Offloading Wound Offloading (Prevention Methods): Bed, pressure redistribution/air     INTAKE/OUPUT    Date 05/29/17 0700 - 05/30/17 0659 05/30/17 0700 - 05/31/17 0659   Shift 4984-3285 2228-4294 24 Hour Total 4023-4828 6581-5308 24 Hour Total   I  N  T  A  K  E   P.O. 1320  1320         P. O. 1320  1320       Shift Total  (mL/kg) 1320  (13.9)  1320  (13.9)      O  U  T  P  U  T   Urine  (mL/kg/hr)            Urine Occurrence(s) 2 x 5 x 7 x       Stool            Stool Occurrence(s) 0 x 0 x 0 x       Shift Total  (mL/kg)         NET 1320  1320      Weight (kg) 95.3 95.3 95.3 95.3 95.3 95.3       Recommendations:  1. Patient needs and requests: none    2. Diet: diabetic    3. Pending tests/procedures: am labs     4. Functional Level/Equipment: assist x 2/wheelchair    5.  Estimated Discharge Date:  5/31/17 tbd Posted on Whiteboard in [facility]: no     HEALS Safety Check    A safety check occurred in the patient's room between off going nurse and oncoming nurse listed above. The safety check included the below items  Area Items   H  High Alert Medications - Verify all high alert medication drips (heparin, PCA, etc.)   E  Equipment - Suction is set up for ALL patients (with lashay)  - Red plugs utilized for all equipment (IV pumps, etc.)  - WOWs wiped down at end of shift.  - Room stocked with oxygen, suction, and other unit-specific supplies   A  Alarms - Bed alarm is set for fall risk patients  - Ensure chair alarm is in place and activated if patient is up in a chair   L  Lines - Check IV for any infiltration  - Renae bag is empty if patient has a Renae   - Tubing and IV bags are labeled   S  Safety   - Room is clean, patient is clean, and equipment is clean. - Hallways are clear from equipment besides carts. - Fall bracelet on for fall risk patients  - Ensure room is clear and free of clutter  - Suction is set up for ALL patients (with lashay)  - Hallways are clear from equipment besides carts.    - Isolation precautions followed, supplies available outside room, sign posted   José Antonio Jordan RN

## 2017-05-30 NOTE — PROGRESS NOTES
Problem: Mobility Impaired (Adult and Pediatric)  Goal: *Acute Goals and Plan of Care (Insert Text)  Physical Therapy Goals  STGs  Initiated 2017, updated 2017, and to be accomplished by d/c [17]  1. Patient will roll side to side in bed with independence. (S)  2. Patient will perform supine to sit and sit to supine with independence. (S)  3. Patient will transfer from bed to chair and chair to bed with supervision using the least restrictive device. (MET with RW)  4. Patient will perform sit to stand with supervision. (MET)  5. Patient will propel w/c on level surface for 150 ft with R UE and B LEs with supervision. (MET)    LTGs  Initiated 2017, updated 2017, and to be accomplished within 2 - 4 weeks [17]  1. Patient will roll side to side in bed with independence. 2. Patient will perform supine to sit and sit to supine in bed with independence. 3. Patient will transfer from bed to chair and chair to bed with distant supervision using the least restrictive device. (MET)  4. Patient will perform sit to stand with distant supervision. (MET)  5. Patient will ambulate with distant supervision for 150 feet with the least restrictive device. (MET)  5.  Patient will ascend/descend 3 stairs with bilateral handrail(s) with supervision. (MET)     PHYSICAL THERAPY DISCHARGE SUMMARY  Patient Name:Stephen RODRIGUEZ Ernestine   Precautions at discharge:  fall risk     Pain at start of tx: soreness in ankles and feet  Pain at stop of tx: same     Patient identified with name and : yes     Problem List:    Decreased strength B LE  [X]     Decreased strength trunk/core  [X]     Decreased AROM   [X]     Decreased PROM  [ ]     Decreased balance sitting  [ ]     Decreased balance standing  [X]     Decreased endurance  [X]     Pain  [X]        Functional Limitations:   Decreased independence with bed mobility  [ ]     Decreased independence with functional transfers  [ ]     Decreased independence with ambulation  [X]     Decreased independence with stair negotiation  [X]                Outcome Measures: FIM and MMT                 MMT Initial Asssessment    Right Lower Extremity Left Lower Extremity   Hip Flexion 3- 3   Knee Extension 3 3   Knee Flexion 3- 3-   Ankle Dorsiflexion 3- 3-                  MMT Discharge Assessment    Right Lower Extremity Left Lower Extremity   Hip Flexion  4-  3+   Knee Extension  4-  4-   Knee Flexion  3+  3+   Ankle Dorsiflexion  3  3   0/5       No palpable muscle contraction  1/5       Palpable muscle contraction, no joint movement  2-/5      Less than full range of motion in gravity eliminated position  2/5       Able to complete full range of motion in gravity eliminated position  2+/5     Able to initiate movement against gravity  3-/5      More than half but not full range of motion against gravity  3/5       Able to complete full range of motion against gravity  3+/5     Completes full range of motion against gravity with minimal resistance  4-/5      Completes full range of motion against gravity with minimal-moderate resistance  4/5       Completes full range of motion against gravity with moderate resistance  4+/5     Completes full range of motion against gravity with moderate-maximum resistance  5/5       Completes full range of motion against gravity with maximum resistance                 AROM: within functional limits       FIM SCORES Initial Assessment Discharge Assessment   Bed/Chair/Wheelchair Transfers 2 6   Wheelchair Mobility 1 6   Walking [place] 1 5   Steps/Stairs 0 2   PRIMARY MODE OF LOCOMOTION: ambulation  Please see [facility] Interdisciplinary Eval: Coordination/Balance Section for details regarding FIM score description.       BED/CHAIR/WHEELCHAIR TRANSFERS Initial Assessment Discharge Assessment   Rolling Right 1 (Total assistance) 6 (Modified independent)   Rolling Left 1 (Total assistance) 6 (Modified independent)   Supine to Sit 1 (Total assistance) 6 (Modified independent)   Sit to Stand Maximum assistance (in p-bars, pull to stand) Modified independent   Sit to Supine 1 (Total assistance) 6 (Modified independent)   Transfer Assist Score 2 6   Transfer Type Lateral with transfer board Other   Comments pt with difficulty wt shifting, pushing with UE's, and scooting  pt Aries with sit to stand and stand step txfrs without AD with increased time due to slight decreased balance   Car Transfer Not tested Supervision   Car Type n/a car txfr simulator          WHEELCHAIR MOBILITY/MANAGEMENT Initial Assessment Discharge Assessment   Able to Propel 10 feet 278 feet   Functional Level 1 6   Curbs/ramps assistance required 0 (Not tested) 0 (Not tested)   Wheelchair set up assistance required 2 (Maximal assistance) 5 (Stand-by assistance)   Wheelchair management Other (comment) (needs help to manage brakes at this time) Manages left brake;Manages right brake          WALKING INDEPENDENCE Initial Assessment Discharge Assessment   Assistive device Other (comment) (attempted in p-bars, but unable to advance LE's)  (no AD)   Ambulation assistance - level surface    S   Distance 0 Feet (ft) 165 Feet (ft)   Functional Level 1 5   Comments attempted in p-bars, pt unable to wt shift and advance LE's, pain R knee/ankle limits  Pt able to ambulate community distances with S and pt pushing o2 tank   Ambulation assistance - unlevel surface    NT          STEPS/STAIRS Initial Assessment Discharge Assessment   Steps/Stairs ambulated 0 6 (6\" steps)   Rail Use Both Both   Functional Level 0 2   Comments n/a at this time due to inability to stand/walk  pt able to negotiate 6 6\" steps to simulate 5 steps to enter home with BHR and S performing step to pattern. Curbs/Ramps    NT      Intervention 5/30/17: Weekly assessment performed for d/c requiring patient to demonstrate all aspects of functional mobility at highest level of function. See above for current status.           PHYSICAL THERAPY PLAN OF CARE     LTGs: Pt has met all STGs and LTGs except for Independent bed mobility. Pt would benefit from home health physical therapy in order to improve independent functional mobility within the home with use of least restrictive device. Interventions may include range of motion (AROM, PROM B LE/trunk), motor function (B LE/trunk strengthening/coordination), activity tolerance (vitals, oxygen saturation levels), bed mobility training, balance activities, gait training (progressive ambulation program), and functional transfer training. Pt to be discharged 5/31/17. Caregiver training:  Has taken place  Therapy Recommendations upon discharge:   home health PT  Equipment needs at discharge:     none     Please see [facility]; Interdisciplinary Eval, Care Plan, and Patient Education for further information regarding physical therapy discharge summary and plan of care.       Davion Ford, PTA  5/30/2017

## 2017-05-30 NOTE — PROGRESS NOTES
Pt was approved for admission with an update today. Sw faxed updated clinical information with anticipated dc on 5/31.

## 2017-05-30 NOTE — PROGRESS NOTES
[facility] PHYSICAL REHABILITATION  [address] [place] [address], [address]     INPATIENT REHABILITATION  DAILY PROGRESS NOTE     Date: 5/30/2017    Name: Darcy Hernandez Age / Sex: 62 y.o. / female   CSN: [id number] MRN: [id number]   [facility] Date: 5/10/2017 Length of Stay: 20 days     Primary Rehab Diagnosis: Impaired Mobility and ADLs secondary to Systemic inflammatory response syndrome (SIRS) secondary to:  1. Cellulitis of the right forearm  2. Olecranon bursitis of the right elbow  3. Contusion of left elbow  4. Right Achilles tendonitis      Subjective:     Patient seen and examined. Blood pressure controlled. Blood sugars fairly controlled. Team conference was held at bedside this AM.     Patient's Complaint:   No significant medical complaints     Pain Control: ongoing significant pain in which is stable and controlled by current meds      Objective:     Vital Signs:  Patient Vitals for the past 24 hrs:   BP Temp Pulse Resp SpO2   05/30/17 1000 95/69 98 °F (36.7 °C) 88 18 95 %   05/29/17 1930 107/70 99 °F (37.2 °C) 90 22 93 %   05/29/17 1600 112/69 97.9 °F (36.6 °C) 89 19 90 %        Physical Examination:  GENERAL SURVEY: Patient is awake, alert, oriented x 3, sitting comfortably on the chair, not in acute respiratory distress.   HEENT: pink palpebral conjunctivae, anicteric sclerae, no nasoaural discharge, moist oral mucosa  NECK: supple, no jugular venous distention, no palpable lymph nodes  CHEST/LUNGS: symmetrical chest expansion, good air entry, clear breath sounds  HEART: adynamic precordium, good S1 S2, no S3, regular rhythm, no murmurs  ABDOMEN: obese, bowel sounds appreciated, soft, non-tender  EXTREMITIES: (+) left hand wrapped in gauze, pink nailbeds, (+) decreased warmth/erythema/swelling of both elbows, (+) bipedal edema, full and equal pulses, no calf tenderness   NEUROLOGICAL EXAM: The patient is awake, alert and oriented x3, able to answer questions fairly appropriately, able to follow 1 and 2 step commands. Able to tell time from the wall clock. Cranial nerves II-XII are grossly intact. No gross sensory deficit. Motor strength is 2+/5 on both shoulders, 3/5 on the right elbow, 3-/5 on the left elbow, 2+/5 on both wrists, 3+/5 on the right handgrip, 3/5 on the left handgrip, 3-/5 on the right hip, 3/5 on the left hip, 3-/5 on both knees and both ankles.       Current Medications:  Current Facility-Administered Medications   Medication Dose Route Frequency    docusate sodium (COLACE) capsule 100 mg  100 mg Oral DAILY    senna-docusate (PERICOLACE) 8.6-50 mg per tablet 2 Tab  2 Tab Oral DAILY    febuxostat (ULORIC) tablet 40 mg  40 mg Oral DAILY    diclofenac (VOLTAREN) 1 % topical gel 2 g  2 g Topical QID    insulin glargine (LANTUS) injection 25 Units  25 Units SubCUTAneous QHS    cefTRIAXone (ROCEPHIN) 1 g in 0.9% sodium chloride (MBP/ADV) 50 mL MBP  1 g IntraVENous Q24H    insulin glargine (LANTUS) injection 65 Units  65 Units SubCUTAneous ACB    magnesium oxide (MAG-OX) tablet 800 mg  800 mg Oral BID    oxyCODONE-acetaminophen (PERCOCET) 5-325 mg per tablet 1 Tab  1 Tab Oral TID    oxyCODONE-acetaminophen (PERCOCET) 5-325 mg per tablet 1 Tab  1 Tab Oral Q4H PRN    tolterodine (DETROL) tablet 1 mg  1 mg Oral BID    famotidine (PEPCID) tablet 20 mg  20 mg Oral DAILY    gabapentin (NEURONTIN) capsule 300 mg  300 mg Oral Q12H    diphenhydrAMINE (BENADRYL) capsule 25 mg  25 mg Oral QHS    cholecalciferol (VITAMIN D3) tablet 1,000 Units  1,000 Units Oral DAILY    acetaminophen (TYLENOL) tablet 650 mg  650 mg Oral Q4H PRN    bisacodyl (DULCOLAX) tablet 10 mg  10 mg Oral Q48H PRN    heparin (porcine) injection 5,000 Units  5,000 Units SubCUTAneous Q8H    insulin lispro (HUMALOG) injection   SubCUTAneous TIDAC    albuterol (PROVENTIL VENTOLIN) nebulizer solution 2.5 mg  2.5 mg Nebulization Q4H PRN    divalproex DR (DEPAKOTE) tablet 250 mg  250 mg Oral QHS    ARIPiprazole (ABILIFY) tablet 10 mg  10 mg Oral DAILY    traZODone (DESYREL) tablet 100 mg  100 mg Oral QHS    rosuvastatin (CRESTOR) tablet 5 mg  5 mg Oral QHS    aspirin chewable tablet 81 mg  81 mg Oral DAILY WITH BREAKFAST    levothyroxine (SYNTHROID) tablet 100 mcg  100 mcg Oral ACB    sertraline (ZOLOFT) tablet 50 mg  50 mg Oral DAILY    umeclidinium-vilanterol (ANORO ELLIPTA) 62.5 mcg- 25 mcg/inhalation  1 Puff Inhalation QAM RT    B complex-vitaminC-folic acid (NEPHROCAP) cap  1 Cap Oral DAILY       Allergies:   Allergies   Allergen Reactions    Moxifloxacin Rash    Pcn [Penicillins] Swelling    Sulfa (Sulfonamide Antibiotics) Swelling       Functional Progress:    OCCUPATIONAL THERAPY    ON ADMISSION MOST RECENT   Eating  Functional Level: 2   Eating  Functional Level: 7     Grooming  Functional Level: 1   Grooming  Functional Level: 1     Bathing  Functional Level: 1   Bathing  Functional Level: 1     Upper Body Dressing  Functional Level: 1   Upper Body Dressing  Functional Level: 1     Lower Body Dressing  Functional Level: 1   Lower Body Dressing  Functional Level: 1     Toileting  Functional Level: 2   Toileting  Functional Level: 2     Toilet Transfers  Toilet Transfer Score: 0   Toilet Transfers  Toilet Transfer Score: 4     Tub /Shower Transfers  Tub/Shower Transfer Score: 0   Tub/Shower Transfers  Tub/Shower Transfer Score: 0       Legend:   7 - Independent   6 - Modified Independent   5 - Standby Assistance / Supervision / Set-up   4 - Minimum Assistance / Contact Guard Assistance   3 - Moderate Assistance   2 - Maximum Assistance   1 - Total Assistance / Dependent       Lab/Data Review:  Recent Results (from the past 24 hour(s))   GLUCOSE, POC    Collection Time: 05/29/17 12:38 PM   Result Value Ref Range    Glucose (POC) 173 (H) 70 - 110 mg/dL   GLUCOSE, POC    Collection Time: 05/29/17  5:10 PM   Result Value Ref Range    Glucose (POC) 192 (H) 70 - 110 mg/dL   GLUCOSE, POC    Collection Time: 05/29/17  8:55 PM   Result Value Ref Range    Glucose (POC) 212 (H) 70 - 110 mg/dL   GLUCOSE, POC    Collection Time: 05/30/17  5:36 AM   Result Value Ref Range    Glucose (POC) 181 (H) 70 - 110 mg/dL   GLUCOSE, POC    Collection Time: 05/30/17  8:16 AM   Result Value Ref Range    Glucose (POC) 132 (H) 70 - 110 mg/dL   GLUCOSE, POC    Collection Time: 05/30/17 11:22 AM   Result Value Ref Range    Glucose (POC) 285 (H) 70 - 110 mg/dL       Estimated Glomerular Filtration Rate:  CKD-EPI:   On admission, estimated GFR was 53.6 mL/min/1.73m2 based on a Creatinine of 1.26 mg/dl. Most recent estimated GFR was 41.7 mL/min/1.73m2 based on a Creatinine of 1.55 mg/dl. MDRD:   On admission, estimated GFR was 55.7 mL/min/1.73m2 based on a Creatinine of 1.26 mg/dl. Most recent estimated GFR was 43.9 mL/min/1.73m2 based on a Creatinine of 1.55 mg/dl. Assessment:     Primary Rehabilitation Diagnosis  1. Impaired Mobility and ADLs  2. Systemic inflammatory response syndrome (SIRS) secondary to:   a. Cellulitis of the right forearm   b. Olecranon bursitis of the right elbow   c. Contusion of left elbow   d.  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 heart failure    Depression    History of back injury    Anxiety    Wears glasses    History of hepatitis C    Sickle cell trait (HCC)    History of acute renal failure    Hypothyroidism    Recurrent genital herpes    Sarcoidosis ([facility] [address]    History pf abscess of right arm    Hypoxemia requiring supplemental oxygen    Abnormal nuclear stress test    History of cellulitis and abscess of hand    History of cellulitis and abscess of foot    Intravenous drug user    History of penicillin allergy    Falls frequently    Gastroesophageal reflux disease with hiatal hernia    Memory difficulty    Mixed connective tissue disease     CKD stage G3a/A1, GFR 45-59 and albumin creatinine ratio <30 mg/g     Acute renal failure superimposed on stage 3 chronic kidney disease    Hypomagnesemia    Acute cystitis without hematuria       Plan:     1. Justification for continued stay: Good progression towards established rehabilitation goals. 2. Medical Issues being followed closely:    [x]  Fall and safety precautions     [x]  Wound Care     [x]  Bowel and Bladder Function     [x]  Fluid Electrolyte and Nutrition Balance     [x]  Pain Control      3. Issues that 24 hour rehabilitation nursing is following:    [x]  Fall and safety precautions     [x]  Wound Care     [x]  Bowel and Bladder Function     [x]  Fluid Electrolyte and Nutrition Balance     [x]  Pain Control      [x]  Assistance with and education on in-room safety with transfers to and from the bed, wheelchair, toilet and shower. 4. Acute rehabilitation plan of care:    [x]  Continue current care and rehab. [x]  Physical Therapy           [x]  Occupational Therapy           []  Speech Therapy     []  Hold Rehab until further notice     5. Medications:    [x]  MAR Reviewed     [x]  Continue Present Medications     6. DVT Prophylaxis:      []  Lovenox     [x]  Unfractionated Heparin     []  Coumadin     []  NOAC     [x]  ROBERT Stockings     []  Sequential Compression Device     []  None     7.  Orders:   > Systemic inflammatory response syndrome (SIRS) secondary to Cellulitis of the right forearm, Olecranon bursitis of the right elbow, Contusion of left elbow and Right Achilles tendonitis    > Patient had completed the recommended treatment course of oral Clindamycin on 5/13/2017     > Abnormally low HDL with hypertriglyceridemia   > Continue Rosuvastatin 5 mg PO q HS     > 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 congestive heart failure    > On 5/11/2017, discontinued Furosemide 20 mg PO once daily (re: rising Creatinine)     > Bipolar affective disease   > Continue:    > Aripiprazole 10 mg PO once daily    > Divalproex  mg PO q HS     > Chronic obstructive pulmonary disease   > Continue:    > Anoro Ellipta 1 puff inhaled once daily    > Albuterol nebulization q 4 hr PRN for shortness of breath     > Depression / Anxiety   > Continue:    > Aripiprazole 10 mg PO once daily    > Sertraline 50 mg PO once daily    > Trazodone 100 mg PO q HS     > Diabetic neuropathy   > On 5/15/2017, decreased Gabapentin from 300 mg PO q 8 hr to 300 mg PO q 12 hr (re: decrease in CrCl)   > Continue Gabapentin 300 mg PO q 12 hr     > Diastolic dysfunction without heart failure   > On 5/11/2017, discontinued Furosemide 20 mg PO once daily (re: rising Creatinine)   > On 5/14/2017, resumed Eplerenone 25 mg PO once daily    > On 5/15/2017, discontinued Eplerenone 25 mg PO once daily (re: decrease in CrCl)     > Gastroesophageal reflux disease with hiatal hernia   > On 5/15/2017, decreased Famotidine from 20 mg PO BID to 20 mg PO once daily   > Continue Famotidine 20 mg PO once daily     > Hypothyroidism   > Continue Levothyroxine 100 mcg PO once daily before breakfast     > Hypoxemia requiring supplemental oxygen   > Continue O2 inhalation 2 LPM via nasal cannula continuous     > Type 2 diabetes mellitus with diabetic neuropathy and stage 3 chronic kidney disease    > Prior to admission to [facility], the patient claimed she wa snot using Lantus at home; instead, she was using Insulin U500 on a sliding scale basis   > On 5/18/2017, changed Lantus from 65 units SC q HS to 40 units SC q AM and 20 units SC q PM   > On 5/19/2017:    > Increased Lantus from 40 units to 44 units SC q AM    > Increased Lantus from 20 units to 22 units SC q PM   > On 5/22/2017:    > Increased Lantus from 44 units to 50 units SC q AM    > Increased Lantus from 22 units to 25 units SC q PM   > On 5/24/2017, increased Lantus from 50 units to 55 units SC q AM   > On 5/25/2017, increased Lantus from 55 units to 60 units SC q AM   > On 5/26/2017, increased Lantus from 60 units to 65 units SC q AM   > Continue:    > Increase Lantus from 65 units to 70 units SC q AM    > Increase Lantus from 25 units to 30 units SC q PM    > Humalog insulin sliding scale SC TID AC only     > Hypomagnesemia   > Mg (5/11/2017, on admission) = 1.6   > Patient was started on Mg(OH)2 400 mg PO BID   > Mg (5/22/2017) = 1.5   > On 5/22/2017, increased Mg(OH)2 from 400 mg to 800 mg PO BID   > Mg (5/25/2017) = 1.7   > Mg (5/29/2017) = 2.0   > Decrease Mg(OH)2 from 800 mg to 400 mg PO BID    > CKD stage G3a/A1, GFR 45-59 and albumin creatinine ratio <30 mg/g     > On admission, based on a Creatinine of 1.26 mg/dl:    Estimated GFR using CKD-EPI = 53.6 mL/min/1.73m2    Estimated GFR using MDRD = 55.7 mL/min/1.73m2    > Urine microalbumin/Creatinine ratio (5/11/2017) = 9 mg/g    > Acute renal failure superimposed on stage 3 chronic kidney disease   > BUN/Creatinine (5/10/2017) = 19/1.33    > BUN/Creatinine (5/11/2017, on admission) = 16/1.26    > On 5/12/2017:    > Discontinued Diclofenac 2 grams applied to affected areas 4 times daily     > Started Naproxen 375 mg PO BID with meals   > On 5/14/2017, resumed Eplerenone 25 mg PO once daily    > BUN/Creatinine (5/15/2017) = 22/1.87    > Urinalysis (5/15/2017) showed SG 1.014, no casts   > On 5/15/2017:    > Discontinued Naproxen 375 mg PO BID with meals    > Discontinued Eplerenone 25 mg PO once daily     > Decreased Gabapentin from 300 mg PO q 8 hr to 300 mg PO q 12 hr     > Decreased Famotidine from 20 mg PO BID to 20 mg PO once daily    > Patient was given IVF: 0.9%  ml/hr x 1 liter   > On 5/16/2017, patient was given IVF: 0.9%  ml/hr x 1 liter   > On 5/17/2017, patient was given IVF: 0.9%  ml/hr x 1 liter   > On 5/18/2017, patient was given IVF: 0.9%  ml/hr x 1 liter   > BUN/Creatinine (5/20/2017) = 16/1.58    > BUN/Creatinine (5/21/2017) = 17/1.58   > BUN/Creatinine (5/22/2017) = 19/1.62   > On 5/22/2017, patient was given IVF: 0.9%  ml/hr x 1 liter   > On 5/23/2017, patient was given IVF: 0.9%  ml/hr x 1 liter   > BUN/Creatinine (5/25/2017) = 20/1.51    > On 5/25/2017, Dr. Lonnie Molina ordered Furosemide 40 mg IV x 2 doses   > BUN/Creatinine (5/27/2017) = 27/1.91    > On 5/27/2017, patient was given IVF: 0.9%  ml/hr x 1 liter   > BUN/Creatinine (5/28/2017) = 25/1.63   > BUN/Creatinine (5/29/2017) = 25/1.55   > Increase oral fluid intake    > Difficulty sleeping    > Continue:    > Trazodone 100 mg PO q HS    > Diphenhydramine HCl 25 mg PO q HS    > Urinary urge incontinence   > On 5/15/2017, added Diphenhydramine HCl 25 mg PO q HS (re: ADR is urinary retention)   > On 5/17/2017, added Tolterodine 1 mg PO BID   > Continue:    > Tolterodine 1 mg PO BID    > Diphenhydramine HCl 25 mg PO q HS    > Constipation   > On 5/25/2017:    > Decreased Docusate sodium to 100 mg PO once daily after breakfast    > Added Pericolace 2 tabs PO once daily after dinner   > Continue:    > Docusate sodium 100 mg PO once daily after breakfast    > Mg(OH)2 800 mg PO BID    > Pericolace 2 tabs PO once daily after dinner    > Acute cystitis without hematuria   > (+) pain after urination reported 5/25/2017   > No documented fever   > Urinalysis (5/26/2017) showed clear urine, nitrites negative, leukocyte esterase large, WBC TNTC, RBC 1-10   > Urine culture (5/26/2017) yielded growth of  >100,000 colonies/ml of Escherichia coli sensitive to Ceftriaxone   > On 5/26/2017, patient was empirically started on Ceftriaxone 1 gram IV q 24 hr x 5 days   > Continue Ceftriaxone 1 gram IV q 24 hr x 5 days (final day today)    > Hyperuricemia   > Uric acid (5/25/2017) = 7.9   > On 5/28/2017, patient was started on Febuxostat 40 mg PO once daily   > Continue Febuxostat 40 mg PO once daily    > Analgesia   > On 5/12/2017:    > Discontinued Diclofenac 2 grams applied to affected areas 4 times daily     > Started Naproxen 375 mg PO BID with meals   > On 5/15/2017, discontinued Naproxen 375 mg PO BID with meals (re: decrease in CrCl)   > On 5/17/2017:    > Discontinued Norco 5/325 1 tab PO q 4 hr PRN for pain level greater than 4/10    > Added:     > Percocet 5/325 1 tab PO TID (8AM, 12PM, 4PM)     > Percocet 5/325 1 tab PO q 4 hr PRN for pain level greater than 4/10 (from 8PM to 4AM only)   > On 5/25/2017, Dr. Sara Almanza ordered Ketorolac 15 mg IV q 6 hr x 4 doses   > On 5/26/2017, discontinued Ketorolac after 2nd dose (re: patient said the pain is not that bad and there was no difference after getting the 2 doses plus she is worried that her Creatinine would bump up)   > On 5/27/2017, Dr. Sara Almanza added Diclofenac 1% topical gel, 2 grams applied to affected area 4 times daily   > Continue:    > Acetaminophen 650 mg PO q 4 hr PRN for pain level less than 5/10    > Diclofenac 1% topical gel, 2 grams applied to affected area 4 times daily    > Percocet 5/325 1 tab PO TID (8AM, 12PM, 4PM)    > Percocet 5/325 1 tab PO q 4 hr PRN for pain level greater than 4/10 (from 8PM to 4AM only)        8. Patient's progress in rehabilitation and medical issues discussed with the patient. All questions answered to the best of my ability. Care plan discussed with patient and nurse.     9. Discharge Planning:   > For discharge to home tomorrow   [address] [place] [address] Physical Therapy, Occupational Therapy and Skilled Nursing (for medication reconciliation and disease education)   > F/U: 1. PCP (Dr. Elvie Liang)    2. Rheumatology (Dr. Roberto Mcelroy)    3. Orthopedics (Dr. Yosef Rm.  Rolando Nava)      Signed:    Tonya Hedrick MD    May 30, 2017

## 2017-05-30 NOTE — PROGRESS NOTES
Problem: Self Care Deficits Care Plan (Adult)  Goal: *Acute Goals and Plan of Care (Insert Text)  Long Term Goals (to be met upon discharge date) in order to increase pts functional independence and safety, and decrease burden of care:  1. Pt will perform self-feeding with setup. 2. Pt will perform grooming with setup  3. Pt will perform UB bathing with Min A.  4. Pt will perform LB bathing with Min A.  5. Pt will perform tub/shower transfer with Min A.   6. Pt will perform UB dressing with Min A.  7. Pt will perform LB dressing with Min A.  8. Pt will perform toileting task with Min A.  9. Pt will perform toilet transfer with Min A. Short Term Weekly Goals for (2017 to 2017) in order to increase pts functional independence and safety, and decrease burden of care:  1. Pt will perform self-feeding with S.   2. Pt will perform grooming with S.  3. Pt will perform UB bathing with S.  4. Pt will perform LB bathing with S.  5. Pt will perform tub/shower transfer with S.  6. Pt will perform UB dressing with S.  7. Pt will perform LB dressing with min A.  8. Pt will perform toileting task with S.  9. Pt will perform toilet transfer with S.   OCCUPATIONAL THERAPY DISCHARGE SUMMARY  Patient Name:Stephen Sinha  Time Spent With Patient  Time In: 1001  Time Out: 1110     Time In: 1335  Time Out: 1400     Pain at start of tx: Not rated  Pain at stop of tx: Not rated     Patient identified with name and : yes  Objective: AM Session: Pt performed full body shower; see below for ADL and functional transfer levels. PM Session: Caregiver training performed with pt and her nurse. Pt and caregiver verbalize and demonstrate understanding of pt's needs and recommendations for safety in the home environment.      Problem List:    Decreased strength B UE  [X]     Decreased strength trunk/core  [X]     Decreased AROM   [X]     Decreased PROM  [X]     Decreased balance sitting  [ ]     Decreased balance standing  [X] Decreased endurance  [X]     Pain  [X]        Functional Limitations:   Decreased independence with ADL  [X]     Decreased independence with functional transfers  [X]     Decreased independence with ambulation  [X]     Decreased independence with IADL  [X]        Outcome Measures:                  MMT Initial Assessment    Right Upper Extremity  Left Upper Extermity    UE AROM  Limited against gravity all joints  Limited against gravity all joints   Shoulder flexion  2+  2+   Shoulder extension  2+  2+   Shoulder ABDuction  2+  2+   Shoulder ADDUction  2+  2+   Elbow Flexion  3  2+   Elbow Extension  3  3   Wrist Extension/Flexion  2+  2+     3+  3                          MMT Discharge Assessment    Right Upper Extremity  Left Upper Extermity    UE AROM WFL L shoulder flexion limited against gravity   Shoulder flexion 3+ 3-   Shoulder extension 3+ 3+   Shoulder ABDuction 3+ 3-   Shoulder ADDUction 3+ 3+   Elbow Flexion 3+ 3+   Elbow Extension 3+ 3+   Wrist Extension/Flexion 3+ 3+    3+ 3+         0/5       No palpable muscle contraction  1/5       Palpable muscle contraction, no joint movement  2-/5      Less than full range of motion in gravity eliminated position  2/5       Able to complete full range of motion in gravity eliminated position  2+/5     Able to initiate movement against gravity  3-/5      More than half but not full range of motion against gravity  3/5       Able to complete full range of motion against gravity  3+/5     Completes full range of motion against gravity with minimal resistance  4-/5      Completes full range of motion against gravity with minimal-moderate resistance  4/5       Completes full range of motion against gravity with moderate resistance  4+/5     Completes full range of motion against gravity with moderate-maximum resistance  5/5       Completes full range of motion against gravity with maximum resistance     Coordination: Impaired UEs  Sensation: Intact UEs FIM SCORES Initial Assessment Discharge Assessment   Eating 2 Feeding/Eating  Feeding/Eating Assistance: 5 (Supervision/setup)  Comments: Pt needs intermittent assistance to open packages and cut food. She is able to feed herself. Grooming 1 Grooming  Grooming Assistance : 6 (Modified independent)  Comments: Pt able to stand at the sink to perform oral care, and she washes her face during shower. Oral Hygiene FIM: 6    Bathing 1 Upper Body Bathing  Bathing Assistance, Upper: 5 (Supervision)  Position Performed: Seated in chair  Comments: Setup only  Lower Body Bathing  Bathing Assistance, Lower : 5 (Supervision) (for standing while washing tyrell area and buttocks.)  Position Performed: Seated in chair;Standing  Adaptive Equipment: Long handled sponge   Upper Body Dressing 1 Upper Body Dressing   Dressing Assistance : 4 (Minimal assistance) (for bra fastener)  Comments: Pt able to doff/shante t-shirt with encouragement only. Lower Body Dressing 1 Lower Body Dressing   Dressing Assistance : 3 (Moderate assistance)  Leg Crossed Method Used: Yes (R leg only)  Position Performed: Seated in chair;Standing  Adaptive Equipment Used: Dressing stick; Reacher (Min A for reacher to shante pants)  Don/Doff Anti-Embolic Stockings: 1 (Total assistance)  Comments: Pt able to use dressing stick to doff socks and pants over B feet with minimal cues for use. She utilized reacher to thread LEs into pants with min cues for the technique. She needs slight assistance to adjust pants up all the way in the back.       Sock and/or Shoe Management FIM: 2   Toileting 1 Toileting  Toileting Assistance (FIM Score): 4 (Minimal assistance) (for adjustment of pants in the back)   Bladder - level of assist 5 -   Bladder - accident frequency score NT -   Bowel - level of assist 6 -   Bowel - accident frequency score NT -   Tub/Shower Transfer 0     Toilet Transfer 1 Functional Transfers  Toilet Transfer :  (Stand step transfer without AD to [facility] over toilet)  Amount of Assistance Required: 6 (Modified independent)  Tub or Shower Type: Tub/Shower combination  Amount of Assistance Required: 6 (Modified independent)  Adaptive Equipment: Tub transfer bench   Comprehension 5 6   Expression 6 6   Social Interaction 4 5   Problem Solving 4 4   Memory 5 5   Please see [facility] Interdisciplinary Eval: Coordination/Balance Section for details regarding FIM score description. OCCUPATIONAL THERAPY PLAN OF CARE     LTGs: Pt has achieved all LTGs except for Min A with LB dressing due to decreased UE strength and increased pain. Pt would benefit from continued skilled occupational therapy in order to improve self care and functional mobility within the home with use of least restrictive device. Interventions may include ADL training, NMRE, cognitive re-training,  range of motion (AROM, PROM B UE), motor function (B UE strengthening/coordination), activity tolerance (vitals, oxygen saturation levels),and functional transfer training. Pt to be discharged home with assistance provided by aide. . Caregiver Training: Completed 5/30/2017  Therapy recommendations:   Home Health OT  Equipment recommendations:    3:1 commode; Tub transfer bench     Please see [facility]; Interdisciplinary Eval, Care Plan, and Patient Education for further information regarding occupational therapy discharge summary and plan of care.       Deloris Hutson, OTR  5/30/2017

## 2017-05-30 NOTE — PROGRESS NOTES
participated in interdisciplinary rounds and conducted a Follow up consultation and Spiritual Assessment for Stephen Sinha, who is a 62 y.o.,female. The  provided the following Interventions:  Continued the relationship of care and support. Listened empathically as patient shared. Offered prayer and assurance of continued prayer on patients behalf. Chart reviewed. The following outcomes were achieved:  Patient is aware of her next steps as it relates to going home. Patient continuing to gain strength. Patient excited about going home. Plan:  Chaplains will continue to follow and will provide pastoral care on an as needed/requested basis.  recommends bedside caregivers page  on duty if patient shows signs of acute spiritual or emotional distress.        Ciara Emperor  [address] [place] [address]  [phone]

## 2017-05-30 NOTE — PROGRESS NOTES
Sw offered [facility] for home health and pt states that she would like to restart with [facility]. Pt signed FOC and referral was placed in edischarge. Sw reviewed DME and pt states that she does not have a tub transfer bench or bedside commode. Pt agrees to ordering through [facility]. Therapy had previously noted the pt needed a rolling walker but notes today that she no longer has that need.

## 2017-05-31 VITALS
RESPIRATION RATE: 18 BRPM | WEIGHT: 210.1 LBS | TEMPERATURE: 97.1 F | BODY MASS INDEX: 35 KG/M2 | OXYGEN SATURATION: 97 % | DIASTOLIC BLOOD PRESSURE: 77 MMHG | SYSTOLIC BLOOD PRESSURE: 120 MMHG | HEIGHT: 65 IN | HEART RATE: 89 BPM

## 2017-05-31 PROBLEM — E79.0 HYPERURICEMIA: Chronic | Status: ACTIVE | Noted: 2017-05-26

## 2017-05-31 PROBLEM — Z86.39 HISTORY OF VITAMIN D DEFICIENCY: Chronic | Status: ACTIVE | Noted: 2017-05-10

## 2017-05-31 PROBLEM — N39.41 URGE URINARY INCONTINENCE: Chronic | Status: ACTIVE | Noted: 2017-05-10

## 2017-05-31 LAB
GLUCOSE BLD STRIP.AUTO-MCNC: 212 MG/DL (ref 70–110)
GLUCOSE BLD STRIP.AUTO-MCNC: 236 MG/DL (ref 70–110)

## 2017-05-31 PROCEDURE — 74011636637 HC RX REV CODE- 636/637: Performed by: INTERNAL MEDICINE

## 2017-05-31 PROCEDURE — 74011250637 HC RX REV CODE- 250/637: Performed by: INTERNAL MEDICINE

## 2017-05-31 PROCEDURE — 82962 GLUCOSE BLOOD TEST: CPT

## 2017-05-31 PROCEDURE — 74011250636 HC RX REV CODE- 250/636: Performed by: INTERNAL MEDICINE

## 2017-05-31 PROCEDURE — 74011250637 HC RX REV CODE- 250/637: Performed by: FAMILY MEDICINE

## 2017-05-31 PROCEDURE — 74011250637 HC RX REV CODE- 250/637: Performed by: SPECIALIST

## 2017-05-31 RX ORDER — GUAIFENESIN 100 MG/5ML
81 LIQUID (ML) ORAL
Qty: 15 TAB | Refills: 0 | Status: SHIPPED | OUTPATIENT
Start: 2017-05-31 | End: 2019-02-21 | Stop reason: SDUPTHER

## 2017-05-31 RX ORDER — FAMOTIDINE 20 MG/1
20 TABLET, FILM COATED ORAL DAILY
Qty: 15 TAB | Refills: 0 | Status: SHIPPED | OUTPATIENT
Start: 2017-05-31 | End: 2019-01-01 | Stop reason: SDUPTHER

## 2017-05-31 RX ORDER — MELATONIN
2000 DAILY
Qty: 30 TAB | Refills: 0 | Status: ON HOLD | OUTPATIENT
Start: 2017-05-31 | End: 2020-01-01 | Stop reason: CLARIF

## 2017-05-31 RX ORDER — LANOLIN ALCOHOL/MO/W.PET/CERES
400 CREAM (GRAM) TOPICAL 2 TIMES DAILY
Qty: 14 TAB | Refills: 0 | Status: SHIPPED | OUTPATIENT
Start: 2017-05-31 | End: 2017-06-07

## 2017-05-31 RX ORDER — OXYCODONE AND ACETAMINOPHEN 5; 325 MG/1; MG/1
1 TABLET ORAL
Qty: 15 TAB | Refills: 0 | Status: SHIPPED | OUTPATIENT
Start: 2017-05-31 | End: 2019-02-17

## 2017-05-31 RX ORDER — FEBUXOSTAT 40 MG/1
40 TABLET, FILM COATED ORAL DAILY
Qty: 15 TAB | Refills: 0 | Status: SHIPPED | OUTPATIENT
Start: 2017-05-31 | End: 2017-08-24

## 2017-05-31 RX ORDER — INSULIN GLARGINE 100 [IU]/ML
INJECTION, SOLUTION SUBCUTANEOUS
Qty: 1 VIAL | Refills: 0 | Status: ON HOLD | OUTPATIENT
Start: 2017-05-31 | End: 2019-01-01 | Stop reason: SDUPTHER

## 2017-05-31 RX ORDER — INSULIN LISPRO 100 [IU]/ML
INJECTION, SOLUTION INTRAVENOUS; SUBCUTANEOUS
Qty: 1 VIAL | Refills: 0 | Status: SHIPPED | OUTPATIENT
Start: 2017-05-31

## 2017-05-31 RX ORDER — TOLTERODINE TARTRATE 1 MG/1
1 TABLET, EXTENDED RELEASE ORAL 2 TIMES DAILY
Qty: 30 TAB | Refills: 0 | Status: SHIPPED | OUTPATIENT
Start: 2017-05-31 | End: 2018-02-22

## 2017-05-31 RX ORDER — LEVOTHYROXINE SODIUM 100 UG/1
100 TABLET ORAL
Qty: 15 TAB | Refills: 0 | Status: ON HOLD | OUTPATIENT
Start: 2017-05-31 | End: 2020-01-01 | Stop reason: CLARIF

## 2017-05-31 RX ADMIN — DOCUSATE SODIUM 100 MG: 100 CAPSULE, LIQUID FILLED ORAL at 09:09

## 2017-05-31 RX ADMIN — SERTRALINE HYDROCHLORIDE 50 MG: 50 TABLET ORAL at 09:10

## 2017-05-31 RX ADMIN — TOLTERODINE TARTRATE 1 MG: 1 TABLET, FILM COATED ORAL at 09:10

## 2017-05-31 RX ADMIN — FEBUXOSTAT 40 MG: 40 TABLET ORAL at 09:10

## 2017-05-31 RX ADMIN — GABAPENTIN 300 MG: 300 CAPSULE ORAL at 06:09

## 2017-05-31 RX ADMIN — INSULIN LISPRO 4 UNITS: 100 INJECTION, SOLUTION INTRAVENOUS; SUBCUTANEOUS at 09:11

## 2017-05-31 RX ADMIN — OXYCODONE HYDROCHLORIDE AND ACETAMINOPHEN 1 TABLET: 5; 325 TABLET ORAL at 09:10

## 2017-05-31 RX ADMIN — NEPHROCAP 1 CAPSULE: 1 CAP ORAL at 09:09

## 2017-05-31 RX ADMIN — Medication 400 MG: at 09:10

## 2017-05-31 RX ADMIN — HEPARIN SODIUM 5000 UNITS: 5000 INJECTION, SOLUTION INTRAVENOUS; SUBCUTANEOUS at 06:10

## 2017-05-31 RX ADMIN — ARIPIPRAZOLE 10 MG: 10 TABLET ORAL at 09:10

## 2017-05-31 RX ADMIN — LEVOTHYROXINE SODIUM 100 MCG: 100 TABLET ORAL at 06:15

## 2017-05-31 RX ADMIN — DICLOFENAC SODIUM 2 G: 10 GEL TOPICAL at 09:00

## 2017-05-31 RX ADMIN — INSULIN GLARGINE 70 UNITS: 100 INJECTION, SOLUTION SUBCUTANEOUS at 06:12

## 2017-05-31 RX ADMIN — ASPIRIN 81 MG CHEWABLE TABLET 81 MG: 81 TABLET CHEWABLE at 09:09

## 2017-05-31 RX ADMIN — INSULIN LISPRO 4 UNITS: 100 INJECTION, SOLUTION INTRAVENOUS; SUBCUTANEOUS at 13:03

## 2017-05-31 RX ADMIN — CHOLECALCIFEROL TAB 25 MCG (1000 UNIT) 1000 UNITS: 25 TAB at 09:10

## 2017-05-31 RX ADMIN — FAMOTIDINE 20 MG: 20 TABLET ORAL at 09:10

## 2017-05-31 RX ADMIN — OXYCODONE HYDROCHLORIDE AND ACETAMINOPHEN 1 TABLET: 5; 325 TABLET ORAL at 12:12

## 2017-05-31 RX ADMIN — DICLOFENAC SODIUM 2 G: 10 GEL TOPICAL at 13:00

## 2017-05-31 RX ADMIN — HEPARIN SODIUM 5000 UNITS: 5000 INJECTION, SOLUTION INTRAVENOUS; SUBCUTANEOUS at 13:46

## 2017-05-31 NOTE — DISCHARGE SUMMARY
[facility]  [address] [place] [address], [address]     INPATIENT REHABILITATION  DISCHARGE SUMMARY    Name: Juli Guerra MRN: [id number]   Age / Sex: 62 y.o. / female CSN: [id number]   YOB: 1955 Length of Stay: 21 days   Admit Date: 5/10/2017 Discharge Date: 5/31/2017       PRIMARY CARE PHYSICIAN: Anita Headley MD      DISCHARGE DIAGNOSES:    Primary Rehabilitation Diagnosis  1. Impaired Mobility and ADLs  2. Systemic inflammatory response syndrome (SIRS) secondary to:   a. Cellulitis of the right forearm   b. Olecranon bursitis of the right elbow   c. Contusion of left elbow   d.  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 [address]    History pf abscess of right arm    Hypoxemia requiring supplemental oxygen    Abnormal nuclear stress test    History of cellulitis and abscess of hand    History of cellulitis and abscess of foot    Intravenous drug user    History of penicillin allergy    Falls frequently    Gastroesophageal reflux disease with hiatal hernia    Memory difficulty    Mixed connective tissue disease     CKD stage G3a/A1, GFR 45-59 and albumin creatinine ratio <30 mg/g     Acute renal failure superimposed on stage 3 chronic kidney disease    Hypomagnesemia    Acute cystitis without hematuria       CONSULTS CALLED: Rheumatology (Dr. Roberto Gillis)      PROCEDURES DONE: None      BRIEF HISTORY: The patient is a 52-year-old, right-handed, Black female with history of intravenous drug abuse and multiple medical comorbidities who was admitted to [facility] on 5/2/2017 due to increasing left hand and foot pain.      The patient was recently admitted to [facility] on 4/12/2017 due to pain and swelling of the left hand and left foot. The patient underwent an incision and drainage of the abscess of the left hand and the left foot on 4/17/2017 done by Dr. Odell De La Torre. Tanisha Mendez. The patient was discharged on 4/20/2017 on oral Levofloxacin.      The patient was apparently well until about 5 days prior to admission, the patient had a fall and she landed on her left elbow. She had been having left elbow pain and swelling prompting the patient to go to the [facility] Emergency Department on 5/1/2017 for further evaluation. The patient was found to have a fever at the ED. WBC count was 10. 3. X-ray of the left elbow showed a small possible avulsion fracture from the ulnohumeral articulation. X-ray of the left wrist showed no evidence of acute fracture or dislocation; diffuse subcutaneous edema. X-ray of the right knee showed osteonecrosis of the distal femur and proximal tibia; moderate degenerative changes and a small joint effusion. X-ray of the right ankle showed likely osteonecrosis in the distal and proximal tibia. The patient was empirically started on Vancomycin and Levofloxacin. The patient was admitted under the service of the [facility] (Dr. Milli Hurtado).  Infectious Disease consult (Dr. Jen Ennsi) was called for evaluation and comanagement. Vancomycin and Levofloxacin were discontinued on 5/4/2017. Orthopedics consult (Dr. Patricia Jorge) was called for evaluation and comanagement. X-ray of the right elbow showed no acute fracture or dislocation. CT scan of the left elbow showed no evidence of fracture or healing fracture at left elbow; particularly, no fracture at the ulnar coronoid process; minimal, early osteoarthritic changes with subchondral sclerosis at left elbow; moderate effusion in left elbow joint; moderately prominent olecranon bursa with suspected olecranon bursitis; again, there is focal soft tissue density at the dorsal aspect of the olecranon process of left ulna suggestive of olecranon bursitis; there are also evidences of soft tissue edema and cellulitis cellulitis in the subcutaneous soft tissues of dorsal aspect of proximal and mid left forearm; mild edema, with or without cellulitis can be identified in the subcutaneous tissues of distal portion of left arm and at the lateral and medial aspect of forearm; at the distal aspect of cerebellum of distal left humerus and to a lesser extent in the distal aspect of capitellum, there are subchondral sclerotic changes, most likely to be early osteoarthritic changes; differential diagnoses may include focal avascular necrosis with sclerotic changes; no deformity of the articular surface. CT scan of the right elbow showed posterior subcutaneous cellulitis from the upper elbow to mid forearm; no large fluid collection identified; no joint effusion; no bony erosion; heterogeneous sclerosis at the capitellum, developing osteonecrosis possible. Venous duplex ultrasound of the right upper extremity showed no evidence of deep venous thrombosis detected in the veins visualized. Rheumatology consult (Dr. Macey Pineda) was called for evaluation and comanagement. X-ray of the right wrist showed no acute diagnostic abnormality. X-ray of the right hand showed no acute diagnostic abnormality. Impression of Dr. Shahzad Rodriguez is inflammatory polyarthritis. Patient was started on oral Clindamycin on 5/8/2017. Anti-RNP was 4.3. JOY was negative. Impression of Dr. Shahzad Rodriguez was early mixed connective tissue disease. The patient had remained hemodynamically stable but due to the above events, the patient was noted to be generally weak and with impaired mobility and ADLs. Patient was felt to be a good candidate for acute inpatient rehabilitation. Upon evaluation by Physical Therapy and Occupational Therapy, the patient was recommended for acute inpatient rehabilitation. The patient was discharged and was subsequently admitted to the [facility] for intensive rehabilitation to help recover strength, function and mobility. COURSE IN THE HOSPITAL: Upon admission to the [facility], the patient underwent physical therapy, occupational therapy and speech therapy. The patient was able to actively participate in the rehabilitation activities and progressed well. On discharge, the patient was able to perform the following activities:    1. Occupational Therapy    ON ADMISSION ON DISCHARGE   Eating  Functional Level: 2   Eating  Functional Level: 5     Grooming  Functional Level: 1   Grooming  Functional Level: 6     Bathing  Functional Level: 1   Bathing  Functional Level: 5     Upper Body Dressing  Functional Level: 1   Upper Body Dressing  Functional Level: 4     Lower Body Dressing  Functional Level: 1   Lower Body Dressing  Functional Level: 3     Toileting  Functional Level: 2   Toileting  Functional Level: 6     Toilet Transfers  Toilet Transfer Score: 0   Toilet Transfers  Toilet Transfer Score: 6     Tub /Shower Transfers  Tub/Shower Transfer Score: 0   Tub/Shower Transfers  Tub/Shower Transfer Score: 6       2.  Physical Therapy    ON ADMISSION ON DISCHARGE   Wheelchair Mobility/Management  Able to Propel (ft): 10 feet  Functional Level: 1  Curbs/Ramps Assist Required (FIM Score): 0 (Not tested)  Wheelchair Setup Assist Required : 2 (Maximal assistance)  Wheelchair Management: Other (comment) (needs help to manage brakes at this time) Wheelchair Mobility/Management  Able to Propel (ft): 278 feet  Functional Level: 6  Curbs/Ramps Assist Required (FIM Score): 0 (Not tested)  Wheelchair Setup Assist Required : 5 (Stand-by assistance)  Wheelchair Management: Manages left brake, Manages right brake     Gait  Amount of Assistance: 1 (Dependent/total assistance)  Distance (ft): 0 Feet (ft)  Assistive Device: Other (comment) (attempted in p-bars, but unable to advance LE's) Gait  Amount of Assistance: 5 (Stand-by assistance)  Distance (ft): 165 Feet (ft)  Assistive Device:  (no AD)     Balance-Sitting/Standing  Sitting - Static: Good (unsupported)  Sitting - Dynamic: Fair (occasional)  Standing - Static: Poor  Standing - Dynamic : Impaired Balance-Sitting/Standing  Sitting - Static: Good (unsupported)  Sitting - Dynamic: Good (unsupported)  Standing - Static: Fair  Standing - Dynamic : Impaired     Bed/Mat Mobility  Rolling Right : 1 (Total assistance)  Rolling Left : 1 (Total assistance)  Supine to Sit : 1 (Total assistance)  Sit to Supine : 1 (Total assistance) Bed/Mat Mobility  Rolling Right : 6 (Modified independent)  Rolling Left : 6 (Modified independent)  Supine to Sit : 6 (Modified independent)  Sit to Supine : 6 (Modified independent)     Transfers  Transfer Type: Lateral with transfer board  Other: stand step txfr without AD/anterior approach  Transfer Assistance : 2 (Maximal assistance)  Sit to Stand Assistance: Maximum assistance (in p-bars, pull to stand)  Car Transfers: Not tested  Car Type: n/a Transfers  Transfer Type:  Other  Other: stand step txfr without AD  Transfer Assistance : 6 (Modified independent)  Sit to Stand Assistance: Modified independent  Car Transfers: Supervision  Car Type: car txfr simulator     Steps or Stairs  Steps/Stairs Ambulated (#): 0  Level of Assist : 0 (Not tested)  Rail Use: Both Steps or Stairs  Steps/Stairs Ambulated (#): 6 (6\" steps)  Level of Assist : 5 (Supervision/setup)  Rail Use: Both       3. Speech and Language Pathology    ON ADMISSION ON DISCHARGE   Comprehension (Native Language)  Primary Mode of Comprehension: Auditory  Score: 4 Comprehension (Native Language)  Primary Mode of Comprehension: Auditory  Score: 5     Expression (Native Language)  Primary Mode of Expression: Verbal  Score: 4   Expression (Native Language)  Primary Mode of Expression: Verbal  Score: 6     Social Interaction/Pragmatics  Score: 4 Social Interaction/Pragmatics  Score: 5     Problem Solving  Score: 3   Problem Solving  Score: 4     Memory  Score: 3 Memory  Score: 5       Legend:   7 - Independent   6 - Modified Independent   5 - Standby Assistance / Supervision / Set-up   4 - Minimum Assistance / Contact Guard Assistance   3 - Moderate Assistance   2 - Maximum Assistance   1 - Total Assistance / Dependent       ACUTE MEDICAL ISSUES ADDRESSED IN INPATIENT REHABILITATION FACILITY:     > Systemic inflammatory response syndrome (SIRS) secondary to Cellulitis of the right forearm, Olecranon bursitis of the right elbow, Contusion of left elbow and Right Achilles tendonitis    > Patient had completed the recommended treatment course of oral Clindamycin on 5/13/2017      > Abnormally low HDL with hypertriglyceridemia   > Continue Rosuvastatin 5 mg PO q HS      > 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 congestive heart failure    > On 5/11/2017, discontinued Furosemide 20 mg PO once daily (re: rising Creatinine)      > Bipolar affective disease   > Continue:    > Aripiprazole 10 mg PO once daily    > Divalproex  mg PO q HS      > Chronic obstructive pulmonary disease   > Continue:    > Anoro Ellipta 1 puff inhaled once daily    > Albuterol nebulization q 4 hr PRN for shortness of breath      > Depression / Anxiety   > Continue:    > Aripiprazole 10 mg PO once daily    > Sertraline 50 mg PO once daily    > Trazodone 100 mg PO q HS      > Diabetic neuropathy   > On 5/15/2017, decreased Gabapentin from 300 mg PO q 8 hr to 300 mg PO q 12 hr (re: decrease in CrCl)   > Continue Gabapentin 300 mg PO q 12 hr      > Diastolic dysfunction without heart failure   > On 5/11/2017, discontinued Furosemide 20 mg PO once daily (re: rising Creatinine)   > On 5/14/2017, resumed Eplerenone 25 mg PO once daily    > On 5/15/2017, discontinued Eplerenone 25 mg PO once daily (re: decrease in CrCl)      > Gastroesophageal reflux disease with hiatal hernia   > On 5/15/2017, decreased Famotidine from 20 mg PO BID to 20 mg PO once daily   > Continue Famotidine 20 mg PO once daily      > Hypothyroidism   > Continue Levothyroxine 100 mcg PO once daily before breakfast      > Hypoxemia requiring supplemental oxygen   > Continue O2 inhalation 2 LPM via nasal cannula continuous      > Type 2 diabetes mellitus with diabetic neuropathy and stage 3 chronic kidney disease    > [facility] the patient claimed she wa snot using Lantus at home; instead, she was using Insulin U500 on a sliding scale basis   > On 5/18/2017, changed Lantus from 65 units SC q HS to 40 units SC q AM and 20 units SC q PM   > On 5/19/2017:    > Increased Lantus from 40 units to 44 units SC q AM    > Increased Lantus from 20 units to 22 units SC q PM   > On 5/22/2017:    > Increased Lantus from 44 units to 50 units SC q AM    > Increased Lantus from 22 units to 25 units SC q PM   > On 5/24/2017, increased Lantus from 50 units to 55 units SC q AM   > On 5/25/2017, increased Lantus from 55 units to 60 units SC q AM   > On 5/26/2017, increased Lantus from 60 units to 65 units SC q AM   > On 5/30/2017:    > Increased Lantus from 65 units to 70 units SC q AM    > Increased Lantus from 25 units to 30 units SC q PM   > On the day of discharge, increased Lantus from 30 units to 40 units SC q PM   > Continue:    > Lantus 70 units SC q AM    > Lantus 40 units SC q PM    > Humalog insulin sliding scale SC TID AC only      > Hypomagnesemia   > Mg (5/11/2017, on admission) = 1.6   > Patient was started on Mg(OH)2 400 mg PO BID   > Mg (5/22/2017) = 1.5   > On 5/22/2017, increased Mg(OH)2 from 400 mg to 800 mg PO BID   > Mg (5/25/2017) = 1.7   > Mg (5/29/2017) = 2.0   > On 5/30/2017, decreased Mg(OH)2 from 800 mg to 400 mg PO BID     > CKD stage G3a/A1, GFR 45-59 and albumin creatinine ratio <30 mg/g    > On admission, based on a Creatinine of 1.26 mg/dl:    Estimated GFR using CKD-EPI = 53.6 mL/min/1.73m2    Estimated GFR using MDRD = 55.7 mL/min/1.73m2   > Urine microalbumin/Creatinine ratio (5/11/2017) = 9 mg/g     > Acute renal failure superimposed on stage 3 chronic kidney disease   > BUN/Creatinine (5/10/2017) = 19/1.33    > BUN/Creatinine (5/11/2017, on admission) = 16/1.26    > On 5/12/2017:    > Discontinued Diclofenac 2 grams applied to affected areas 4 times daily     > Started Naproxen 375 mg PO BID with meals   > On 5/14/2017, resumed Eplerenone 25 mg PO once daily     > BUN/Creatinine (5/15/2017) = 22/1.87     > Urinalysis (5/15/2017) showed SG 1.014, no casts   > On 5/15/2017:    > Discontinued Naproxen 375 mg PO BID with meals    > Discontinued Eplerenone 25 mg PO once daily     > Decreased Gabapentin from 300 mg PO q 8 hr to 300 mg PO q 12 hr     > Decreased Famotidine from 20 mg PO BID to 20 mg PO once daily    > Patient was given IVF: 0.9%  ml/hr x 1 liter   > On 5/16/2017, patient was given IVF: 0.9%  ml/hr x 1 liter   > On 5/17/2017, patient was given IVF: 0.9%  ml/hr x 1 liter   > On 5/18/2017, patient was given IVF: 0.9%  ml/hr x 1 liter   > BUN/Creatinine (5/20/2017) = 16/1.58    > BUN/Creatinine (5/21/2017) = 17/1.58   > BUN/Creatinine (5/22/2017) = 19/1.62   > On 5/22/2017, patient was given IVF: 0.9%  ml/hr x 1 liter   > On 5/23/2017, patient was given IVF: 0.9%  ml/hr x 1 liter   > BUN/Creatinine (5/25/2017) = 20/1.51    > On 5/25/2017, Dr. Roberto Gillis ordered Furosemide 40 mg IV x 2 doses   > BUN/Creatinine (5/27/2017) = 27/1.91    > On 5/27/2017, patient was given IVF: 0.9%  ml/hr x 1 liter   > BUN/Creatinine (5/28/2017) = 25/1.63   > BUN/Creatinine (5/29/2017) = 25/1.55   > Increase oral fluid intake     > Difficulty sleeping    > Continue:    > Trazodone 100 mg PO q HS    > Diphenhydramine HCl 25 mg PO q HS     > Urinary urge incontinence   > On 5/15/2017, added Diphenhydramine HCl 25 mg PO q HS (re: ADR is urinary retention)   > On 5/17/2017, added Tolterodine 1 mg PO BID   > Continue:    > Tolterodine 1 mg PO BID    > Diphenhydramine HCl 25 mg PO q HS     > Constipation   > On 5/25/2017:    > Decreased Docusate sodium to 100 mg PO once daily after breakfast    > Added Pericolace 2 tabs PO once daily after dinner              > During the patient's stay at the [facility], the patient was given:     > Docusate sodium 100 mg PO once daily after breakfast    > Mg(OH)2 800 mg PO BID    > Pericolace 2 tabs PO once daily after dinner     > Acute cystitis without hematuria   > (+) pain after urination reported 5/25/2017   > No documented fever   > Urinalysis (5/26/2017) showed clear urine, nitrites negative, leukocyte esterase large, WBC TNTC, RBC 1-10   > Urine culture (5/26/2017) yielded growth of >100,000 colonies/ml of Escherichia coli sensitive to Ceftriaxone   > On 5/26/2017, patient was empirically started on Ceftriaxone 1 gram IV q 24 hr x 5 days   > On 5/30/2017, patient had completed a 5-day treatment course of intravenous Ceftriaxone      > Hyperuricemia   > Uric acid (5/25/2017) = 7.9   > On 5/28/2017, patient was started on Febuxostat 40 mg PO once daily   > Continue Febuxostat 40 mg PO once daily     > Analgesia   > On 5/12/2017:    > Discontinued Diclofenac 2 grams applied to affected areas 4 times daily     > Started Naproxen 375 mg PO BID with meals   > On 5/15/2017, discontinued Naproxen 375 mg PO BID with meals (re: decrease in CrCl)   > On 5/17/2017:    > Discontinued Norco 5/325 1 tab PO q 4 hr PRN for pain level greater than 4/10    > Added:     > Percocet 5/325 1 tab PO TID (8AM, 12PM, 4PM)     > Percocet 5/325 1 tab PO q 4 hr PRN for pain level greater than 4/10 (from 8PM to 4AM only)   > On 5/25/2017, Dr. Mandi Rendon ordered Ketorolac 15 mg IV q 6 hr x 4 doses   > On 5/26/2017, discontinued Ketorolac after 2nd dose (re: patient said the pain is not that bad and there was no difference after getting the 2 doses plus she is worried that her Creatinine would bump up)   > On 5/27/2017, Dr. Mandi Rendon added Diclofenac 1% topical gel, 2 grams applied to affected area 4 times daily              > During the patient's stay at the [facility], the patient was given:     > Acetaminophen 650 mg PO q 4 hr PRN for pain level less than 5/10    > Diclofenac 1% topical gel, 2 grams applied to affected area 4 times daily    > Percocet 5/325 1 tab PO TID (8AM, 12PM, 4PM)    > Percocet 5/325 1 tab PO q 4 hr PRN for pain level greater than 4/10 (from 8PM to 4AM only)      MEDICATIONS ON DISCHARGE:    Current Discharge Medication List      START taking these medications    Details   famotidine (PEPCID) 20 mg tablet Take 1 Tab by mouth daily. Indications: PREVENTION OF STRESS ULCER  Qty: 15 Tab, Refills: 0    Associated Diagnoses: Gastroesophageal reflux disease with hiatal hernia      febuxostat (ULORIC) 40 mg tab tablet Take 1 Tab by mouth daily. Indications: GOUT PREVENTION  Qty: 15 Tab, Refills: 0    Associated Diagnoses: Hyperuricemia      magnesium oxide (MAG-OX) 400 mg tablet Take 1 Tab by mouth two (2) times a day for 7 days. Indications: HYPOMAGNESEMIA  Qty: 14 Tab, Refills: 0    Associated Diagnoses: Hypomagnesemia      aspirin 81 mg chewable tablet Take 1 Tab by mouth daily (with breakfast). Indications: prevention of cerebrovascular accident  Qty: 15 Tab, Refills: 0      tolterodine (DETROL) 1 mg tablet Take 1 Tab by mouth two (2) times a day. Indications: URINARY URGE INCONTINENCE  Qty: 30 Tab, Refills: 0    Associated Diagnoses: Urge urinary incontinence      cholecalciferol (VITAMIN D3) 1,000 unit tablet Take 2 Tabs by mouth daily. Indications: PREVENTION OF VITAMIN D DEFICIENCY  Qty: 30 Tab, Refills: 0    Associated Diagnoses: History of vitamin D deficiency         CONTINUE these medications which have CHANGED    Details   insulin glargine (LANTUS) 100 unit/mL injection Inject 70 units subcutaneously before breakfast (7AM) and 40 units subcutaneously after dinner (7PM). Indications: type 2 diabetes mellitus  Qty: 1 Vial, Refills: 0    Associated Diagnoses: Type 2 diabetes mellitus with stage 3 chronic kidney disease, with long-term current use of insulin ([facility])      insulin lispro (HUMALOG) 100 unit/mL injection To be given 15 min before meals: < 150 - None, 151-200 - 2 u, 201-250 - 4 u, 251-300 - 6 u, 301-350 - 8 u, 351-400 - 10 u, >400 - 12 u  Qty: 1 Vial, Refills: 0    Associated Diagnoses: Type 2 diabetes mellitus with stage 3 chronic kidney disease, with long-term current use of insulin ([facility])      oxyCODONE-acetaminophen (PERCOCET) 5-325 mg per tablet Take 1 Tab by mouth every six (6) hours as needed for Pain. Max Daily Amount: 4 Tabs. Indications: Pain  Qty: 15 Tab, Refills: 0    Associated Diagnoses: Cellulitis of right forearm; Olecranon bursitis of right elbow; Contusion of left elbow, subsequent encounter; Right Achilles tendinitis      levothyroxine (SYNTHROID) 100 mcg tablet Take 1 Tab by mouth Daily (before breakfast). Indications: hypothyroidism  Qty: 15 Tab, Refills: 0    Associated Diagnoses: Acquired hypothyroidism         CONTINUE these medications which have NOT CHANGED    Details   ARIPiprazole (ABILIFY) 5 mg tablet Take 10 mg by mouth daily.  Indications: BIPOLAR DISORDER IN REMISSION    Comments: take 2 tabs each morning      rosuvastatin (CRESTOR) 5 mg tablet Take 1 Tab by mouth nightly. Qty: 30 Tab, Refills: 6      sertraline (ZOLOFT) 100 mg tablet Take 50 mg by mouth daily. Indications: DEPRESSION ASSOCIATED WITH BIPOLAR DISORDER      umeclidinium-vilanterol (ANORO ELLIPTA) 62.5-25 mcg/actuation inhaler Take 1 Puff by inhalation daily. Qty: 1 Inhaler, Refills: 5      VENTOLIN HFA 90 mcg/actuation inhaler INHALE 2 PUFFS EVERY 4 HOURS AS NEEDED  Qty: 1 Inhaler, Refills: 4      divalproex DR (DEPAKOTE) 250 mg tablet Take 250 mg by mouth nightly. Indications: BIPOLAR DISORDER IN REMISSION      insulin syringe,safetyneedle 1 mL 30 gauge x 5/16\" syrg As directed  Qty: 50 Each, Refills: 0      albuterol (PROVENTIL VENTOLIN) 2.5 mg /3 mL (0.083 %) nebulizer solution USE 1 NEB EVERY 4 HOURS FOR WHEEZING AND SHORTNESS OF BREATH  Indications: CHRONIC OBSTRUCTIVE PULMONARY DISEASE  Qty: 2 Package, Refills: 3    Associated Diagnoses: Chronic obstructive pulmonary disease, unspecified COPD type (HCC)      gabapentin (NEURONTIN) 300 mg capsule Take 300 mg by mouth three (3) times daily. Indications: NEUROPATHIC PAIN      acetaminophen (TYLENOL) 325 mg tablet Take 325 mg by mouth every six (6) hours as needed for Pain. Indications: Fever, Pain      traZODone (DESYREL) 100 mg tablet Take 100 mg by mouth nightly. Indications: major depressive disorder      l-methylfolate-vit b12-vit b6 (METANX) 2.8-2-25 mg Tab Take 1 Tab by mouth daily. Nebulizer Accessories Kit Use with nebulizer machine  Qty: 1 Kit, Refills: prn    Associated Diagnoses: COPD (chronic obstructive pulmonary disease) ([address]      Oxygen-Air Delivery Systems by Nasal route continuous.  2 LPM via NC  Indications: Hypoxemia requiring supplementary oxygen         STOP taking these medications       insulin CONCENTRATED regular (U-500 CONCENTRATED) 500 unit/mL soln Comments:   Reason for Stopping:         eplerenone (INSPRA) 25 mg tablet Comments:   Reason for Stopping:         potassium chloride (KLOR-CON) 20 mEq packet Comments:   Reason for Stopping:         ergocalciferol (VITAMIN D2) 50,000 unit capsule Comments:   Reason for Stopping:         furosemide (LASIX) 20 mg tablet Comments:   Reason for Stopping:           Estimated Glomerular Filtration Rate:  CKD-EPI:   On admission, estimated GFR was 53.6 mL/min/1.73m2 based on a Creatinine of 1.26 mg/dl. Most recent estimated GFR was 41.7 mL/min/1.73m2 based on a Creatinine of 1.55 mg/dl.     MDRD:   On admission, estimated GFR was 55.7 mL/min/1.73m2 based on a Creatinine of 1.26 mg/dl. Most recent estimated GFR was 43.9 mL/min/1.73m2 based on a Creatinine of 1.55 mg/dl. DISCHARGE VITAL SIGNS:  Visit Vitals    /77    Pulse 89    Temp 97.1 °F (36.2 °C)    Resp 18    Ht 5' 5\" (1.651 m)    Wt 95.3 kg (210 lb 1.6 oz)    SpO2 97%    BMI 34.96 kg/m2       DISCHARGE PHYSICAL EXAMINATION:  GENERAL SURVEY: Patient is awake, alert, oriented x 3, sitting comfortably on the chair, not in acute respiratory distress. HEENT: pink palpebral conjunctivae, anicteric sclerae, no nasoaural discharge, moist oral mucosa  NECK: supple, no jugular venous distention, no palpable lymph nodes  CHEST/LUNGS: symmetrical chest expansion, good air entry, clear breath sounds  HEART: adynamic precordium, good S1 S2, no S3, regular rhythm, no murmurs  ABDOMEN: obese, bowel sounds appreciated, soft, non-tender  EXTREMITIES: (+) left hand wrapped in gauze, pink nailbeds, (+) decreased warmth/erythema/swelling of both elbows, (+) bipedal edema, full and equal pulses, no calf tenderness   NEUROLOGICAL EXAM: The patient is awake, alert and oriented x3, able to answer questions fairly appropriately, able to follow 1 and 2 step commands. Able to tell time from the wall clock. Cranial nerves II-XII are grossly intact. No gross sensory deficit.  Motor strength is 3+/5 on BUE (except for 3/5 on the left shoulder), 4-/5 on the right hip, 3+/5 on the left hip, 4/5 on both knees, 3/5 on both ankles. CONDITION ON DISCHARGE: Stable. DISPOSITION: Patient clinically improved and was discharged to home with home health physical therapy, occupational therapy and skilled nursing. The patient is temporarily homebound secondary to functional deficits due to Systemic inflammatory response syndrome (SIRS) secondary to Cellulitis of the right forearm, Olecranon bursitis of the right elbow, Contusion of left elbow and Right Achilles tendonitis. She can ambulate without using an assistive device (see above). The patient would benefit from continued skilled physical therapy in order to improve independent functional mobility within the home with use of least restrictive device. The patient would also benefit from continued skilled occupational therapy in order to improve self care and functional mobility within the home with use of least restrictive device. Short-term skilled nursing is needed for medication reconciliation and disease education. FOLLOW-UP RECOMMENDATIONS:   Follow-up Information     Follow up With Details Comments [address] [facility]    Aneesh Manrique MD On 6/1/2017 Patient has an appointment scheduled with PCP Dr. Lauren Denny on June 1, 2017 @ 2:00pm. [address] [facility] [address]      Stephanie Bazzi MD On 6/14/2017 Patient has an appointment scheduled with Orthopedic Dr. Ananya Villanueva on June 14, 2017 @ 10:00am. [facility].      Sailaja Argueta MD On 6/12/2017 Patient has an appointment scheduled with Rheumatologist Dr. Tai Borrego on June 12, 2017 @ 2:30pm.  Patient will need to arrvie 15mins. early for paperwork. [address] [id number] [phone]            OTHER INSTRUCTIONS:  1. Diet. Diabetic, low saturated fat diet. 2. Activity. As tolerated. 3. Safety / fall precautions. TIME SPENT ON DISCHARGE ACTIVITIES: More than 30 minutes.       Signed:  Kenny Galan MD    5/31/2017

## 2017-05-31 NOTE — DISCHARGE INSTRUCTIONS
DISCHARGE SUMMARY from Nurse    The following personal items are in your possession at time of discharge:    Dental Appliances: Uppers  Visual Aid: None     Home Medications: None  Jewelry: Earrings (gold hoops)  Clothing: At bedside  Other Valuables: Cell Phone             PATIENT INSTRUCTIONS:    After general anesthesia or intravenous sedation, for 24 hours or while taking prescription Narcotics:  · Limit your activities  · Do not drive and operate hazardous machinery  · Do not make important personal or business decisions  · Do  not drink alcoholic beverages  · If you have not urinated within 8 hours after discharge, please contact your surgeon on call. Report the following to your surgeon:  · Excessive pain, swelling, redness or odor of or around the surgical area  · Temperature over 100.5  · Nausea and vomiting lasting longer than 4 hours or if unable to take medications  · Any signs of decreased circulation or nerve impairment to extremity: change in color, persistent  numbness, tingling, coldness or increase pain  · Any questions        What to do at Home:            *  Please give a list of your current medications to your Primary Care Provider. *  Please update this list whenever your medications are discontinued, doses are      changed, or new medications (including over-the-counter products) are added. *  Please carry medication information at all times in case of emergency situations. These are general instructions for a healthy lifestyle:    No smoking/ No tobacco products/ Avoid exposure to second hand smoke    Surgeon General's Warning:  Quitting smoking now greatly reduces serious risk to your health.     Obesity, smoking, and sedentary lifestyle greatly increases your risk for illness    A healthy diet, regular physical exercise & weight monitoring are important for maintaining a healthy lifestyle    You may be retaining fluid if you have a history of heart failure or if you experience any of the following symptoms:  Weight gain of 3 pounds or more overnight or 5 pounds in a week, increased swelling in our hands or feet or shortness of breath while lying flat in bed. Please call your doctor as soon as you notice any of these symptoms; do not wait until your next office visit. Recognize signs and symptoms of STROKE:    F-face looks uneven    A-arms unable to move or move unevenly    S-speech slurred or non-existent    T-time-call 911 as soon as signs and symptoms begin-DO NOT go       Back to bed or wait to see if you get better-TIME IS BRAIN. Warning Signs of HEART ATTACK     Call 911 if you have these symptoms:   Chest discomfort. Most heart attacks involve discomfort in the center of the chest that lasts more than a few minutes, or that goes away and comes back. It can feel like uncomfortable pressure, squeezing, fullness, or pain.  Discomfort in other areas of the upper body. Symptoms can include pain or discomfort in one or both arms, the back, neck, jaw, or stomach.  Shortness of breath with or without chest discomfort.  Other signs may include breaking out in a cold sweat, nausea, or lightheadedness. Don't wait more than five minutes to call 911 - MINUTES MATTER! Fast action can save your life. Calling 911 is almost always the fastest way to get lifesaving treatment. Emergency Medical Services staff can begin treatment when they arrive -- up to an hour sooner than if someone gets to the hospital by car. The discharge information has been reviewed with the patient. The patient verbalized understanding. Discharge medications reviewed with the patient and appropriate educational materials and side effects teaching were provided. Patient armband removed and shredded  MyChart Activation    Thank you for requesting access to [employer]. Please follow the instructions below to securely access and download your online medical record.  [employer] allows you to send messages to your doctor, view your test results, renew your prescriptions, schedule appointments, and more. How Do I Sign Up? 1. In your internet browser, go to [email or web address]  2. Click on the First Time User? Click Here link in the Sign In box. You will be redirect to the New Member Sign Up page. 3. Enter your [employer] Access Code exactly as it appears below. You will not need to use this code after youve completed the sign-up process. If you do not sign up before the expiration date, you must request a new code. MyChart Access Code: Activation code not generated  Current [employer] Status: Patient Declined (This is the date your [employer] access code will )    4. Enter the last four digits of your Social Security Number (xxxx) and Date of Birth (mm/dd/yyyy) as indicated and click Submit. You will be taken to the next sign-up page. 5. Create a [employer] ID. This will be your [employer] login ID and cannot be changed, so think of one that is secure and easy to remember. 6. Create a [employer] password. You can change your password at any time. 7. Enter your Password Reset Question and Answer. This can be used at a later time if you forget your password. 8. Enter your e-mail address. You will receive e-mail notification when new information is available in [address] 9. Click Sign Up. You can now view and download portions of your medical record. 10. Click the Download Summary menu link to download a portable copy of your medical information. Additional Information    If you have questions, please visit the Frequently Asked Questions section of the [employer] website at [email or web address] Remember, [employer] is NOT to be used for urgent needs. For medical emergencies, dial 911.                  ------------------------------------------------------------------------------------------------------------    DISCHARGE INSTRUCTIONS    1.  Make sure that when you request refills at the pharmacy that the refill requests are sent to your PCP (NOT to the prescriber at the [facility]) to avoid any delays in getting your medication refills. The physician at the [facility] [address] will not able to order medications or refills after discharge -- Please understand that though we would like to help, it is simply not safe for our physician to order you a medication that we cannot monitor. 2. If any of the prescribed medications require a prior authorization, contact your Primary Care Physician or specialist to EITHER complete prior authorizations and paperwork on your behalf OR prescribe an alternative medication. -- Please understand that though we would like to help, it is simply not safe for our physician to order you a medication that we cannot monitor. 3. Over-the-counter (OTC) medications will NOT be prescribed. You need to buy these medications over-the-counter.     -------------------------------------------------------------------------------------------------------------------

## 2017-05-31 NOTE — REHAB NOTE
[facility]  Bedside and Verbal shift change report given to OLIVIER Junior (oncoming nurse) by Cherylene Pottier, RN   (offgoing nurse). Report included the following information SBAR, Kardex, MAR and Recent Results. Situation:   Code Status: Full Code   Reason for Admission: [facility] Day: 21   Problem List:   Hospital Problems  Date Reviewed: 5/30/2017          Codes Class Noted POA    Hypomagnesemia ICD-10-CM: E83.42  ICD-9-CM: 275.2  5/22/2017 No    Overview Signed 5/22/2017  2:29 PM by Chente Vasquez MD     Mg (5/22/2017) = 1.              Acute renal failure superimposed on stage 3 chronic kidney disease (HCC) ICD-10-CM: N17.9, N18.3  ICD-9-CM: 584.9, 585.3  5/15/2017 No        Cellulitis of right forearm ICD-10-CM: L03.113  ICD-9-CM: 682.3  5/4/2017 Yes        Olecranon bursitis of right elbow ICD-10-CM: M70.21  ICD-9-CM: 726.33  5/4/2017 Yes        Contusion of left elbow ICD-10-CM: S50.02XA  ICD-9-CM: 923.11  5/4/2017 Yes        Impaired mobility and ADLs ICD-10-CM: Z74.09  ICD-9-CM: 799.89  5/4/2017 Yes        * (Principal)SIRS (systemic inflammatory response syndrome) ([facility] [address] ICD-10-CM: R65.10  ICD-9-CM: 995.90  5/2/2017 Yes        Right Achilles tendinitis ICD-10-CM: M76.61  ICD-9-CM: 726.71  5/2/2017 Yes        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 congestive heart failure (Chronic) ICD-10-CM: I13.10, N18.3  ICD-9-CM: 404.10, 585.3  Unknown Yes    Overview Signed 4/23/2013 10:02 AM by Esvin Hopkins     Better controlled             Type 2 diabetes with stage 3 chronic kidney disease GFR 30-59 (HCC) (Chronic) ICD-10-CM: E11.22, N18.3  ICD-9-CM: 250.40, 585.3  Unknown Yes              Background:   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 congestive heart failure     Better controlled     Bipolar affective disorder ([address] 12/5/2012    Cellulitis of right forearm 5/4/2017    Chronic back pain     Chronic obstructive pulmonary disease (COPD) (HCC)     SOB, on paula O2 Recent admission with psychosis     CKD stage G3a/A1, GFR 45-59 and albumin creatinine ratio <30 mg/g 5/11/2017    Urine microalbumin/Creatinine ratio (5/11/2017) = 9 mg/g    Contusion of left elbow 5/4/2017    Depression     Diabetic neuropathy associated with type 2 diabetes mellitus (HCC)     Diastolic dysfunction without heart failure     Stable on diuretics     Falls frequently     Gastroesophageal reflux disease with hiatal hernia     Generalized osteoarthritis of multiple sites     History of acute renal failure 5/31/2013    History of back injury     jumped out of second story window     History of hepatitis C     treated    History of penicillin allergy     Hypothyroidism     Hypoxemia requiring supplemental oxygen 12/29/2014    Intravenous drug user 5/2/2017    Memory difficulty     Mixed connective tissue disease ([address] 5/10/2017    Obesity, Class I, BMI 30-34.9     Olecranon bursitis of right elbow 5/4/2017    Recurrent genital herpes 5/31/2013    Right Achilles tendinitis 5/2/2017    Sarcoidosis ([address]     Sickle cell trait ([address]     Type 2 diabetes with stage 3 chronic kidney disease GFR 30-59 ([address]     Venous insufficiency     Wears glasses       Patient taking anticoagulants yes    Patient has a defibrillator: no     Assessment:   Changes in Assessment throughout shift: no     Patient has central line: no Reasons if yes: Insertion date: Last dressing date:   Patient has Renae Cath: no Reasons if yes:     Insertion date:     Last Vitals:     Vitals:    05/29/17 1930 05/30/17 1000 05/30/17 1541 05/30/17 1930   BP: 107/70 95/69 94/70 97/63   Pulse: 90 88 88 94   Resp: 22 18 18 20   Temp: 99 °F (37.2 °C) 98 °F (36.7 °C) 99 °F (37.2 °C) 98.8 °F (37.1 °C)   SpO2: 93% 95% 95% 96%   Weight:       Height:       LMP: 11/25/2012        PAIN    Pain Assessment    Pain Intensity 1: 0 (05/30/17 2000) Pain Intensity 1: 2 (12/29/14 1105)    Pain Location 1: Generalized Pain Location 1: Abdomen    Pain Intervention(s) 1: Medication (see MAR) Pain Intervention(s) 1: Medication (see MAR)  Patient Stated Pain Goal: 0 Patient Stated Pain Goal: 0  o Intervention effective: yes   o Other actions taken for pain: repositioning     Skin Assessment  Skin color Skin Color: Appropriate for ethnicity  Condition/Temperature Skin Condition/Temp: Warm  Integrity Skin Integrity: Wound (add Wound LDA)  Turgor Turgor: Non-tenting  Weekly Pressure Ulcer Documentation  Pressure  Injury Documentation: No Pressure Injury Noted-Pressure Ulcer Prevention Initiated  Wound Prevention & Protection Methods  Orientation of wound Orientation of Wound Prevention: Posterior  Location of Prevention Location of Wound Prevention: Sacrum/Coccyx  Dressing Present Dressing Present : No  Dressing Status    Wound Offloading Wound Offloading (Prevention Methods): Bed, pressure redistribution/air     INTAKE/OUPUT    Date 05/30/17 0700 - 05/31/17 0659 05/31/17 0700 - 06/01/17 0659   Shift 2587-2576 4823-1374 24 Hour Total 6898-8896 4584-4761 24 Hour Total   I  N  T  A  K  E   P. O. 720  720         P. O. 720  720       Shift Total  (mL/kg) 720  (7.6)  720  (7.6)      O  U  T  P  U  T   Urine  (mL/kg/hr)            Urine Occurrence(s) 0 x 6 x 6 x       Stool            Stool Occurrence(s) 0 x 0 x 0 x       Shift Total  (mL/kg)           720      Weight (kg) 95.3 95.3 95.3 95.3 95.3 95.3       Recommendations:  1. Patient needs and requests: none    2. Diet: diabetic    3. Pending tests/procedures: am labs     4. Functional Level/Equipment: assist x 2/wheelchair    5.  Estimated Discharge Date:  5/31/17 tbd Posted on Whiteboard in [facility]: no     HEALS Safety Check    A safety check occurred in the patient's room between off going nurse and oncoming nurse listed above. The safety check included the below items  Area Items   H  High Alert Medications - Verify all high alert medication drips (heparin, PCA, etc.)   E  Equipment - Suction is set up for ALL patients (with lashay)  - Red plugs utilized for all equipment (IV pumps, etc.)  - WOWs wiped down at end of shift.  - Room stocked with oxygen, suction, and other unit-specific supplies   A  Alarms - Bed alarm is set for fall risk patients  - Ensure chair alarm is in place and activated if patient is up in a chair   L  Lines - Check IV for any infiltration  - Renae bag is empty if patient has a Renae   - Tubing and IV bags are labeled   S  Safety   - Room is clean, patient is clean, and equipment is clean. - Hallways are clear from equipment besides carts. - Fall bracelet on for fall risk patients  - Ensure room is clear and free of clutter  - Suction is set up for ALL patients (with lashay)  - Hallways are clear from equipment besides carts.    - Isolation precautions followed, supplies available outside room, sign posted   Sivan Mobley RN

## 2017-05-31 NOTE — PROGRESS NOTES
Discharge instructions and papers given and reviewed. . IV site to right form arm removed. Patient request medical equipment be delivered to home. No distress noted.  Left in care of family

## 2017-05-31 NOTE — FACE TO FACE
[facility] PHYSICAL [address], [address] [place] [address]    Name: Yumiko Sinha Age / Sex: 62 y.o. / female   CSN: [id number] MRN: [id number]   [facility] Date: 5/10/2017 Discharge Date: 5/31/2017     Primary Care Provider: Dr. Margie Klein      Primary Rehabilitation Diagnosis  1. Impaired Mobility and ADLs  2. Systemic inflammatory response syndrome (SIRS) secondary to:   a. Cellulitis of the right forearm   b. Olecranon bursitis of the right elbow   c. Contusion of left elbow   d.  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 congestive heart failure    Depression    History of back injury    Anxiety    Wears glasses    History of hepatitis C    Sickle cell trait (HCC)    History of acute renal failure    Hypothyroidism    Recurrent genital herpes    Sarcoidosis ([facility] [address]    History pf abscess of right arm    Hypoxemia requiring supplemental oxygen    Abnormal nuclear stress test    History of cellulitis and abscess of hand    History of cellulitis and abscess of foot    Intravenous drug user    History of penicillin allergy    Falls frequently    Gastroesophageal reflux disease with hiatal hernia    Memory difficulty    Mixed connective tissue disease     CKD stage G3a/A1, GFR 45-59 and albumin creatinine ratio <30 mg/g     Acute renal failure superimposed on stage 3 chronic kidney disease    Hypomagnesemia    Acute cystitis without hematuria       History of the Present Illness: The patient is a 61-year-old, right-handed, Black female with history of intravenous drug abuse and multiple medical comorbidities who was admitted to [facility] on 5/2/2017 due to increasing left hand and foot pain.       The patient was recently admitted to [facility] on 4/12/2017 due to pain and swelling of the left hand and left foot. The patient underwent an incision and drainage of the abscess of the left hand and the left foot on 4/17/2017 done by Dr. Joao Mckeon. The patient was discharged on 4/20/2017 on oral Levofloxacin.       The patient was apparently well until about 5 days prior to admission, the patient had a fall and she landed on her left elbow. She had been having left elbow pain and swelling prompting the patient to go to the [facility] Emergency Department on 5/1/2017 for further evaluation. The patient was found to have a fever at the ED. WBC count was 10. 3. X-ray of the left elbow showed a small possible avulsion fracture from the ulnohumeral articulation. X-ray of the left wrist showed no evidence of acute fracture or dislocation; diffuse subcutaneous edema. X-ray of the right knee showed osteonecrosis of the distal femur and proximal tibia; moderate degenerative changes and a small joint effusion. X-ray of the right ankle showed likely osteonecrosis in the distal and proximal tibia. The patient was empirically started on Vancomycin and Levofloxacin. The patient was admitted under the service of the [facility] (Dr. Santiago Yepez). Infectious Disease consult (Dr. Ciara Ochoa) was called for evaluation and comanagement. Vancomycin and Levofloxacin were discontinued on 5/4/2017.  Orthopedics consult (Dr. Elham Toro) was called for evaluation and comanagement. X-ray of the right elbow showed no acute fracture or dislocation. CT scan of the left elbow showed no evidence of fracture or healing fracture at left elbow; particularly, no fracture at the ulnar coronoid process; minimal, early osteoarthritic changes with subchondral sclerosis at left elbow; moderate effusion in left elbow joint; moderately prominent olecranon bursa with suspected olecranon bursitis; again, there is focal soft tissue density at the dorsal aspect of the olecranon process of left ulna suggestive of olecranon bursitis; there are also evidences of soft tissue edema and cellulitis cellulitis in the subcutaneous soft tissues of dorsal aspect of proximal and mid left forearm; mild edema, with or without cellulitis can be identified in the subcutaneous tissues of distal portion of left arm and at the lateral and medial aspect of forearm; at the distal aspect of cerebellum of distal left humerus and to a lesser extent in the distal aspect of capitellum, there are subchondral sclerotic changes, most likely to be early osteoarthritic changes; differential diagnoses may include focal avascular necrosis with sclerotic changes; no deformity of the articular surface. CT scan of the right elbow showed posterior subcutaneous cellulitis from the upper elbow to mid forearm; no large fluid collection identified; no joint effusion; no bony erosion; heterogeneous sclerosis at the capitellum, developing osteonecrosis possible. Venous duplex ultrasound of the right upper extremity showed no evidence of deep venous thrombosis detected in the veins visualized. Rheumatology consult (Dr. Dacia Jain) was called for evaluation and comanagement. X-ray of the right wrist showed no acute diagnostic abnormality. X-ray of the right hand showed no acute diagnostic abnormality. Impression of Dr. Self Ra is inflammatory polyarthritis. Patient was started on oral Clindamycin on 5/8/2017. Anti-RNP was 4.3.  JOY was negative. Impression of Dr. Sofia Dyer was early mixed connective tissue disease. The patient had remained hemodynamically stable but due to the above events, the patient was noted to be generally weak and with impaired mobility and ADLs. Patient was felt to be a good candidate for acute inpatient rehabilitation. Upon evaluation by Physical Therapy and Occupational Therapy, the patient was recommended for acute inpatient rehabilitation. The patient was discharged and was subsequently admitted to the [facility] for intensive rehabilitation to help recover strength, function and mobility.       Past Medical History:  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 congestive heart failure     Better controlled     Bipolar affective disorder ([facility] [address] 12/5/2012    Cellulitis of right forearm 5/4/2017    Chronic back pain     Chronic obstructive pulmonary disease (COPD) (HCC)     SOB, on paula O2 Recent admission with psychosis     CKD stage G3a/A1, GFR 45-59 and albumin creatinine ratio <30 mg/g 5/11/2017    Urine microalbumin/Creatinine ratio (5/11/2017) = 9 mg/g    Contusion of left elbow 5/4/2017    Depression     Diabetic neuropathy associated with type 2 diabetes mellitus (HCC)     Diastolic dysfunction without heart failure     Stable on diuretics     Falls frequently     Gastroesophageal reflux disease with hiatal hernia     Generalized osteoarthritis of multiple sites     History of acute renal failure 5/31/2013    History of back injury     jumped out of second story window     History of hepatitis C     treated    History of penicillin allergy     History of vitamin D deficiency 5/10/2017    Hyperuricemia 5/26/2017    Hypothyroidism     Hypoxemia requiring supplemental oxygen 2014    Intravenous drug user 2017    Memory difficulty     Mixed connective tissue disease ([facility] [address] 5/10/2017    Obesity, Class I, BMI 30-34.9     Olecranon bursitis of right elbow 2017    Recurrent genital herpes 2013    Right Achilles tendinitis 2017    Sarcoidosis ([facility] [address]     Sickle cell trait (HCC)     Type 2 diabetes with stage 3 chronic kidney disease GFR 30-59 ([facility] [address]     Urge urinary incontinence 5/10/2017    Venous insufficiency     Wears glasses        Past Surgical History:  Past Surgical History:   Procedure Laterality Date    HX BACK SURGERY      HX  SECTION      HX CYST REMOVAL Left 1979    Left foot    HX OTHER SURGICAL Left 2017    S/P incision and drainage of the abscess of the left hand and the left foot (2017 - Dr. Ever Bell. Olu Carrasquillo)    HX TUBAL LIGATION         Medications on Discharge:    Current Discharge Medication List      START taking these medications    Details   famotidine (PEPCID) 20 mg tablet Take 1 Tab by mouth daily. Indications: PREVENTION OF STRESS ULCER  Qty: 15 Tab, Refills: 0    Associated Diagnoses: Gastroesophageal reflux disease with hiatal hernia      febuxostat (ULORIC) 40 mg tab tablet Take 1 Tab by mouth daily. Indications: GOUT PREVENTION  Qty: 15 Tab, Refills: 0    Associated Diagnoses: Hyperuricemia      magnesium oxide (MAG-OX) 400 mg tablet Take 1 Tab by mouth two (2) times a day for 7 days. Indications: HYPOMAGNESEMIA  Qty: 14 Tab, Refills: 0    Associated Diagnoses: Hypomagnesemia      aspirin 81 mg chewable tablet Take 1 Tab by mouth daily (with breakfast). Indications: prevention of cerebrovascular accident  Qty: 15 Tab, Refills: 0      tolterodine (DETROL) 1 mg tablet Take 1 Tab by mouth two (2) times a day.  Indications: URINARY URGE INCONTINENCE  Qty: 30 Tab, Refills: 0    Associated Diagnoses: Urge urinary incontinence      cholecalciferol (VITAMIN D3) 1,000 unit tablet Take 2 Tabs by mouth daily. Indications: PREVENTION OF VITAMIN D DEFICIENCY  Qty: 30 Tab, Refills: 0    Associated Diagnoses: History of vitamin D deficiency         CONTINUE these medications which have CHANGED    Details   insulin glargine (LANTUS) 100 unit/mL injection Inject 70 units subcutaneously before breakfast (7AM) and 40 units subcutaneously after dinner (7PM). Indications: type 2 diabetes mellitus  Qty: 1 Vial, Refills: 0    Associated Diagnoses: Type 2 diabetes mellitus with stage 3 chronic kidney disease, with long-term current use of insulin ([facility])      insulin lispro (HUMALOG) 100 unit/mL injection To be given 15 min before meals: < 150 - None, 151-200 - 2 u, 201-250 - 4 u, 251-300 - 6 u, 301-350 - 8 u, 351-400 - 10 u, >400 - 12 u  Qty: 1 Vial, Refills: 0    Associated Diagnoses: Type 2 diabetes mellitus with stage 3 chronic kidney disease, with long-term current use of insulin ([facility])      oxyCODONE-acetaminophen (PERCOCET) 5-325 mg per tablet Take 1 Tab by mouth every six (6) hours as needed for Pain. Max Daily Amount: 4 Tabs. Indications: Pain  Qty: 15 Tab, Refills: 0    Associated Diagnoses: Cellulitis of right forearm; Olecranon bursitis of right elbow; Contusion of left elbow, subsequent encounter; Right Achilles tendinitis      levothyroxine (SYNTHROID) 100 mcg tablet Take 1 Tab by mouth Daily (before breakfast). Indications: hypothyroidism  Qty: 15 Tab, Refills: 0    Associated Diagnoses: Acquired hypothyroidism         CONTINUE these medications which have NOT CHANGED    Details   ARIPiprazole (ABILIFY) 5 mg tablet Take 10 mg by mouth daily. Indications: BIPOLAR DISORDER IN REMISSION    Comments: take 2 tabs each morning      rosuvastatin (CRESTOR) 5 mg tablet Take 1 Tab by mouth nightly. Qty: 30 Tab, Refills: 6      sertraline (ZOLOFT) 100 mg tablet Take 50 mg by mouth daily.  Indications: DEPRESSION ASSOCIATED WITH BIPOLAR DISORDER      umeclidinium-vilanterol (ANORO ELLIPTA) 62.5-25 mcg/actuation inhaler Take 1 Puff by inhalation daily. Qty: 1 Inhaler, Refills: 5      VENTOLIN HFA 90 mcg/actuation inhaler INHALE 2 PUFFS EVERY 4 HOURS AS NEEDED  Qty: 1 Inhaler, Refills: 4      divalproex DR (DEPAKOTE) 250 mg tablet Take 250 mg by mouth nightly. Indications: BIPOLAR DISORDER IN REMISSION      insulin syringe,safetyneedle 1 mL 30 gauge x 5/16\" syrg As directed  Qty: 50 Each, Refills: 0      albuterol (PROVENTIL VENTOLIN) 2.5 mg /3 mL (0.083 %) nebulizer solution USE 1 NEB EVERY 4 HOURS FOR WHEEZING AND SHORTNESS OF BREATH  Indications: CHRONIC OBSTRUCTIVE PULMONARY DISEASE  Qty: 2 Package, Refills: 3    Associated Diagnoses: Chronic obstructive pulmonary disease, unspecified COPD type ([facility])      gabapentin (NEURONTIN) 300 mg capsule Take 300 mg by mouth three (3) times daily. Indications: NEUROPATHIC PAIN      acetaminophen (TYLENOL) 325 mg tablet Take 325 mg by mouth every six (6) hours as needed for Pain. Indications: Fever, Pain      traZODone (DESYREL) 100 mg tablet Take 100 mg by mouth nightly. Indications: major depressive disorder      l-methylfolate-vit b12-vit b6 (METANX) 2.8-2-25 mg Tab Take 1 Tab by mouth daily. Nebulizer Accessories Kit Use with nebulizer machine  Qty: 1 Kit, Refills: prn    Associated Diagnoses: COPD (chronic obstructive pulmonary disease) ([facility] [address]      Oxygen-Air Delivery Systems by Nasal route continuous.  2 LPM via NC  Indications: Hypoxemia requiring supplementary oxygen         STOP taking these medications       insulin CONCENTRATED regular (U-500 CONCENTRATED) 500 unit/mL soln Comments:   Reason for Stopping:         eplerenone (INSPRA) 25 mg tablet Comments:   Reason for Stopping:         potassium chloride (KLOR-CON) 20 mEq packet Comments:   Reason for Stopping:         ergocalciferol (VITAMIN D2) 50,000 unit capsule Comments:   Reason for Stopping:         furosemide (LASIX) 20 mg tablet Comments:   Reason for Stopping: Condition on Discharge: Stable. Ambulation Gait  Amount of Assistance: 5 (Stand-by assistance)  Distance (ft): 165 Feet (ft)  Assistive Device:  (no AD)     Wheelchair Mobility Wheelchair Mobility/Management  Able to Propel (ft): 278 feet  Functional Level: 6  Curbs/Ramps Assist Required (FIM Score): 0 (Not tested)  Wheelchair Setup Assist Required : 5 (Stand-by assistance)  Wheelchair Management: Manages left brake, Manages right brake         Disposition: Patient clinically improved and was discharged to home with home health physical therapy, occupational therapy and skilled nursing. The patient is temporarily homebound secondary to functional deficits due to Systemic inflammatory response syndrome (SIRS) secondary to Cellulitis of the right forearm, Olecranon bursitis of the right elbow, Contusion of left elbow and Right Achilles tendonitis. She can ambulate without using an assistive device (see above). The patient would benefit from continued skilled physical therapy in order to improve independent functional mobility within the home with use of least restrictive device. The patient would also benefit from continued skilled occupational therapy in order to improve self care and functional mobility within the home with use of least restrictive device. Short-term skilled nursing is needed for medication reconciliation and disease education. Due to the abovementioned data, I certify that the patient needs intermittent Skilled Nursing, Physical Therapy and Occupational Therapy. I will NOT be following this patient in the Community and Dr. Oziel Krishnamurthy will be responsible [facility]. In compliance with the Affordable Care Act, I certify that this patient was managed by me during this hospitalization and that I had a Face-to-Face Encounter that meets the physician Face-to-Face Encounter requirements.       Signed:    Celeste Conroy MD    May 31, 2017

## 2017-06-05 NOTE — PROGRESS NOTES
Sw spoke with pt's  who is aware of pt's dc to home with home health. No further needs are noted at this time.

## 2017-06-27 ENCOUNTER — OFFICE VISIT (OUTPATIENT)
Dept: ORTHOPEDIC SURGERY | Age: 62
End: 2017-06-27

## 2017-06-27 VITALS
TEMPERATURE: 98.7 F | HEART RATE: 90 BPM | SYSTOLIC BLOOD PRESSURE: 113 MMHG | HEIGHT: 65 IN | BODY MASS INDEX: 31.7 KG/M2 | WEIGHT: 190.25 LBS | DIASTOLIC BLOOD PRESSURE: 72 MMHG

## 2017-06-27 DIAGNOSIS — L08.9 FOOT INFECTION: ICD-10-CM

## 2017-06-27 DIAGNOSIS — L08.9: Primary | ICD-10-CM

## 2017-06-27 DIAGNOSIS — S60.559A: Primary | ICD-10-CM

## 2017-06-27 NOTE — PROGRESS NOTES
AMBULATORY PROGRESS NOTE      Patient: Shanna Merchant             MRN: [id number]     SSN: [id number] Body mass index is 31.66 kg/(m^2). YOB: 1955     AGE: 62 y.o. EX: female    PCP: Claudeen Hasting, MD    IMPRESSION/DIAGNOSIS AND TREATMENT PLAN     DIAGNOSES    1. Foreign body hand-infection    2. Foot infection        No orders of the defined types were placed in this encounter. Stephen Sinha understands her diagnoses and the proposed plan. Plan:    1) Continue activities as tolerated    RTO - one month recheck left hand and left foot  Weight bearing XRs of the knees    HPI AND EXAMINATION     Stephen Sinha IS A 62 y.o. female who presents to my outpatient office complaining of left hand/elbow pain. Patient was seen in the ED 5/10/17 following a fall on her left elbow. The patient, Andrew Woodall, is a 78-year-old female who is well known to me. I have seen her in the past for a left hand infection and a left dorsal foot infection for which I saw her in the hospital when she had surgery about four to six weeks ago. INTERVAL HISTORY:   She is here for followup. She arrives here and has had no additional trauma to her left foot and ankle. She does have a history of stiffness of her knees and we will get some weightbearing films of her knees upon her next visit, as our x-ray tube is broken and is not working, so we will have to get x-rays of her knees upon her next visit. Additional plans are listed as above. Since I saw her last, she has been doing fairly well. On examination, the patient is alert and follows commands. She is in no acute distress. Her affect and mood are appropriate. She is by herself. She is using a cane. She has oxygen. Her left hand is examined. There is no redness, no erythema, no drainage, and no signs of infection.   She has a well-healed, surgical scar from her left hand for which she had an I & D of her left hand in the past. Her left foot is examined. She has a well-healed, dorsal foot wound, prior foot wound. There is no redness, erythema, drainage, and no signs of infection. Peripheral pulses are intact. The toes remain warm and pink. She was seen in the emergency room, apparently, roughly on May 1, 2017, after falling. She was treated for olecranon bursitis of her right elbow, contusion left elbow. She was treated for right Achilles tendinitis and cellulitis of her right forearm. She is well known to me. She had an admission to [facility] on April 12, 2017, for IV drug abuse. She had an infected left dorsal hand and left dorsal foot. She subsequently was seen in the ER on May 1, 2017. All of her studies are well listed in the computer in the imaging section of the reports. Additional imaging studies that she had of her elbow on May 4, 2017, right elbow, showed no acute fracture, subluxation, or dislocation. She had a CT scan of her right elbow that showed some posterior subcutaneous cellulitis of her right upper elbow and right arm. No drainable fluid collection. She was admitted again around that time for cellulitis of her right upper extremity. She had a CT of her left elbow that showed possible suspected fracture along the coronoid region. That is the reason why the CT scan was done, but the CT scan showed no evidence of fracture healing at the left elbow, no fracture of the left elbow, no fracture at the ulnar coracoid process. There are some early osteoarthritic changes seen about the elbow, moderate effusion of the left elbow. So, she has had several admissions, one for infections in her left hand and left foot and on additional admission more recently for her right upper extremity cellulitis. In examining the right upper extremity and left upper extremity, these were benign in terms of swelling at the elbow, forearm, and wrist on each side.   There is no redness or erythema to either elbow.        CHART REVIEW     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 congestive heart failure     Better controlled     Bipolar affective disorder ([address] 12/5/2012    Cellulitis of right forearm 5/4/2017    Chronic back pain     Chronic obstructive pulmonary disease (COPD) ([facility])     SOB, on paula O2 Recent admission with psychosis     CKD stage G3a/A1, GFR 45-59 and albumin creatinine ratio <30 mg/g 5/11/2017    Urine microalbumin/Creatinine ratio (5/11/2017) = 9 mg/g    Contusion of left elbow 5/4/2017    Depression     Diabetic neuropathy associated with type 2 diabetes mellitus ([facility])     Diastolic dysfunction without heart failure     Stable on diuretics     Falls frequently     Gastroesophageal reflux disease with hiatal hernia     Generalized osteoarthritis of multiple sites     History of acute renal failure 5/31/2013    History of back injury     jumped out of second story window     History of hepatitis C     treated    History of penicillin allergy     History of vitamin D deficiency 5/10/2017    Hyperuricemia 5/26/2017    Hypothyroidism     Hypoxemia requiring supplemental oxygen 12/29/2014    Intravenous drug user 5/2/2017    Memory difficulty     Mixed connective tissue disease ([facility] 5/10/2017    Obesity, Class I, BMI 30-34.9     Olecranon bursitis of right elbow 5/4/2017    Recurrent genital herpes 5/31/2013    Right Achilles tendinitis 5/2/2017    Sarcoidosis ([facility]     Sickle cell trait ([facility])     Type 2 diabetes with stage 3 chronic kidney disease GFR 30-59 ([facility])     Urge urinary incontinence 5/10/2017    Venous insufficiency     Wears glasses      Current Outpatient Prescriptions   Medication Sig    famotidine (PEPCID) 20 mg tablet Take 1 Tab by mouth daily. Indications: PREVENTION OF STRESS ULCER    febuxostat (ULORIC) 40 mg tab tablet Take 1 Tab by mouth daily. Indications: GOUT PREVENTION    insulin glargine (LANTUS) 100 unit/mL injection Inject 70 units subcutaneously before breakfast (7AM) and 40 units subcutaneously after dinner (7PM). Indications: type 2 diabetes mellitus    insulin lispro (HUMALOG) 100 unit/mL injection To be given 15 min before meals: < 150 - None, 151-200 - 2 u, 201-250 - 4 u, 251-300 - 6 u, 301-350 - 8 u, 351-400 - 10 u, >400 - 12 u    aspirin 81 mg chewable tablet Take 1 Tab by mouth daily (with breakfast). Indications: prevention of cerebrovascular accident    levothyroxine (SYNTHROID) 100 mcg tablet Take 1 Tab by mouth Daily (before breakfast). Indications: hypothyroidism    tolterodine (DETROL) 1 mg tablet Take 1 Tab by mouth two (2) times a day. Indications: URINARY URGE INCONTINENCE    cholecalciferol (VITAMIN D3) 1,000 unit tablet Take 2 Tabs by mouth daily. Indications: PREVENTION OF VITAMIN D DEFICIENCY    umeclidinium-vilanterol (ANORO ELLIPTA) 62.5-25 mcg/actuation inhaler Take 1 Puff by inhalation daily.  VENTOLIN HFA 90 mcg/actuation inhaler INHALE 2 PUFFS EVERY 4 HOURS AS NEEDED    divalproex DR (DEPAKOTE) 250 mg tablet Take 250 mg by mouth nightly. Indications: BIPOLAR DISORDER IN REMISSION    insulin syringe,safetyneedle 1 mL 30 gauge x 5/16\" syrg As directed    ARIPiprazole (ABILIFY) 5 mg tablet Take 10 mg by mouth daily. Indications: BIPOLAR DISORDER IN REMISSION    albuterol (PROVENTIL VENTOLIN) 2.5 mg /3 mL (0.083 %) nebulizer solution USE 1 NEB EVERY 4 HOURS FOR WHEEZING AND SHORTNESS OF BREATH  Indications: CHRONIC OBSTRUCTIVE PULMONARY DISEASE    gabapentin (NEURONTIN) 300 mg capsule Take 300 mg by mouth three (3) times daily.  Indications: NEUROPATHIC PAIN    acetaminophen (TYLENOL) 325 mg tablet Take 325 mg by mouth every six (6) hours as needed for Pain. Indications: Fever, Pain    traZODone (DESYREL) 100 mg tablet Take 100 mg by mouth nightly. Indications: major depressive disorder    rosuvastatin (CRESTOR) 5 mg tablet Take 1 Tab by mouth nightly.  Nebulizer Accessories Kit Use with nebulizer machine    sertraline (ZOLOFT) 100 mg tablet Take 50 mg by mouth daily. Indications: DEPRESSION ASSOCIATED WITH BIPOLAR DISORDER    Oxygen-Air Delivery Systems by Nasal route continuous. 2 LPM via NC  Indications: Hypoxemia requiring supplementary oxygen    oxyCODONE-acetaminophen (PERCOCET) 5-325 mg per tablet Take 1 Tab by mouth every six (6) hours as needed for Pain. Max Daily Amount: 4 Tabs. Indications: Pain    l-methylfolate-vit b12-vit b6 (METANX) 2.8-2-25 mg Tab Take 1 Tab by mouth daily. No current facility-administered medications for this visit. Allergies   Allergen Reactions    Moxifloxacin Rash    Pcn [Penicillins] Swelling    Sulfa (Sulfonamide Antibiotics) Swelling     Past Surgical History:   Procedure Laterality Date    HX BACK SURGERY      HX  SECTION      HX CYST REMOVAL Left     Left foot    HX OTHER SURGICAL Left 2017    S/P incision and drainage of the abscess of the left hand and the left foot (2017 - Dr. Janet Griffith. Amy Uvalda)    HX TUBAL LIGATION       Social History     Occupational History    Not employed Not Employed     Social History Main Topics    Smoking status: Former Smoker     Packs/day: 0.50     Years: 30.00     Types: Cigarettes     Start date: 1969     Quit date: 10/31/2016    Smokeless tobacco: Never Used      Comment: Per pt.  10 a day     Alcohol use No    Drug use: No      Comment: +Cocaine Screen     Sexual activity: Not on file      Comment: 2014     Family History   Problem Relation Age of Onset    Seizures Other     Diabetes Mother     Hypertension Mother     Heart Disease Mother     Cancer Mother     Diabetes Father     Stroke Father     Heart Disease Father     Headache Sister     Depression Neg Hx     Suicide Neg Hx     Psychotic Disorder Neg Hx     Substance Abuse Neg Hx     Dementia Neg Hx        REVIEW OF SYSTEMS : 6/27/2017  ALL BELOW ARE Negative except : SEE HPI       Constitutional: Negative for fever, chills and weight loss. Neg Weigh Loss  Cardiovascular: Negative for chest pain, claudication and leg swelling. SOB, SNYDER   Gastrointestinal: Negative for  pain, N/V/D/C, Blood in stool or urine,dysuria, hematuria,        Incontinence, pelvic pain  Musculoskeletal: see HPI. Neurological: Negative for dizziness and weakness. Negative for headaches,Visual Changes, Confusion, Seizures,   Psychiatric/Behavioral: Negative for depression, memory loss and substance abuse. Extremities:  Negative for  hair changes, rash or skin lesion changes. Hematologic: Negative for Bleeding problems, bruising, pallor or swollen lymph nodes. Peripheral Vascular: No calf pain, vascular vein tenderness to calf pain              No calf throbbing, posterior knee throbbing pain    DIAGNOSTIC IMAGING     XR Results (most recent):    Results from Hospital Encounter encounter on 05/01/17   XR WRIST RT AP/LAT/OBL MIN 3V   Narrative Right wrist - Three  view(s)    History: Pain and swelling. Comparison: Same day right hand radiograph. Findings:    Mineralization is normal. There is no evidence of acute fracture or dislocation. Small ossific density at the ulnar styloid is well-corticated and likely  chronically unfused possibly related to old trauma. The joint spaces are  maintained. There is no localized soft tissue swelling. There is no radiopaque  foreign body. Impression Impression:    No acute diagnostic abnormality.     Thank you for this referral.      Study Result   Right wrist - Three  view(s)     History: Pain and swelling.     Comparison: Same day right hand radiograph.     Findings:     Mineralization is normal. There is no evidence of acute fracture or dislocation. Small ossific density at the ulnar styloid is well-corticated and likely  chronically unfused possibly related to old trauma. The joint spaces are  maintained. There is no localized soft tissue swelling. There is no radiopaque  foreign body.     IMPRESSION  Impression:     No acute diagnostic abnormality.     Thank you for this referral.           No notes on file    Written by Cle[place] Patient, as dictated by Farhat Montana MD. I, DrJose Armando, Farhat Montana MD, confirm that all documentation is accurate.

## 2017-06-27 NOTE — PATIENT INSTRUCTIONS
The patient is instructed to follow up one month. They were advised to contact us if their condition worsens. Knee Pain or Injury: Care Instructions  Your Care Instructions    Injuries are a common cause of knee problems. Sudden (acute) injuries may be caused by a direct blow to the knee. They can also be caused by abnormal twisting, bending, or falling on the knee. Pain, bruising, or swelling may be severe, and may start within minutes of the injury. Overuse is another cause of knee pain. Other causes are climbing stairs, kneeling, and other activities that use the knee. Everyday wear and tear, especially as you get older, also can cause knee pain. Rest, along with home treatment, often relieves pain and allows your knee to heal. If you have a serious knee injury, you may need tests and treatment. Follow-up care is a key part of your treatment and safety. Be sure to make and go to all appointments, and call your doctor if you are having problems. It's also a good idea to know your test results and keep a list of the medicines you take. How can you care for yourself at home? · Be safe with medicines. Read and follow all instructions on the label. ¨ If the doctor gave you a prescription medicine for pain, take it as prescribed. ¨ If you are not taking a prescription pain medicine, ask your doctor if you can take an over-the-counter medicine. · Rest and protect your knee. Take a break from any activity that may cause pain. · Put ice or a cold pack on your knee for 10 to 20 minutes at a time. Put a thin cloth between the ice and your skin. · Prop up a sore knee on a pillow when you ice it or anytime you sit or lie down for the next 3 days. Try to keep it above the level of your heart. This will help reduce swelling. · If your knee is not swollen, you can put moist heat, a heating pad, or a warm cloth on your knee.   · If your doctor recommends an elastic bandage, sleeve, or other type of support for your knee, wear it as directed. · Follow your doctor's instructions about how much weight you can put on your leg. Use a cane, crutches, or a walker as instructed. · Follow your doctor's instructions about activity during your healing process. If you can do mild exercise, slowly increase your activity. · Reach and stay at a healthy weight. Extra weight can strain the joints, especially the knees and hips, and make the pain worse. Losing even a few pounds may help. When should you call for help? Call 911 anytime you think you may need emergency care. For example, call if:  · You have symptoms of a blood clot in your lung (called a pulmonary embolism). These may include:  ¨ Sudden chest pain. ¨ Trouble breathing. ¨ Coughing up blood. Call your doctor now or seek immediate medical care if:  · You have severe or increasing pain. · Your leg or foot turns cold or changes color. · You cannot stand or put weight on your knee. · Your knee looks twisted or bent out of shape. · You cannot move your knee. · You have signs of infection, such as:  ¨ Increased pain, swelling, warmth, or redness. ¨ Red streaks leading from the knee. ¨ Pus draining from a place on your knee. ¨ A fever. · You have signs of a blood clot in your leg (called a deep vein thrombosis), such as:  ¨ Pain in your calf, back of the knee, thigh, or groin. ¨ Redness and swelling in your leg or groin. Watch closely for changes in your health, and be sure to contact your doctor if:  · You have tingling, weakness, or numbness in your knee. · You have any new symptoms, such as swelling. · You have bruises from a knee injury that last longer than 2 weeks. · You do not get better as expected. Where can you learn more? Go to http://rick-kathya.info/. Enter K195 in the search box to learn more about \"Knee Pain or Injury: Care Instructions. \"  Current as of: March 20, 2017  Content Version: 11.3  © 2414-2048 [employer]. Care instructions adapted under license by [employer] (which disclaims liability or warranty for this information). If you have questions about a medical condition or this instruction, always ask your healthcare professional. [address] any warranty or liability for your use of this information.

## 2017-06-27 NOTE — PROGRESS NOTES
Chief Complaint   Patient presents with    Hand Pain     Left    Elbow Pain     Left     0/10 pain.

## 2017-06-27 NOTE — MR AVS SNAPSHOT
Visit Information Date & Time Provider Department Dept. Phone Encounter #  
 6/27/2017 10:15 AM Madie Caballero MD [address] [facility] [phone] [id number] Your Appointments 8/24/2017 10:00 AM  
ESTABLISHED PATIENT with Ye Villegas MD  
[facility]) Appt Note: 6 months [address], [address] [facility] [address]  
[address], [address] [place] [address] [place] Upcoming Health Maintenance Date Due  
 FOOT EXAM Q1 9/12/1966 EYE EXAM RETINAL OR DILATED Q1 9/12/1966 DTaP/Tdap/Td series (1 - Tdap) 9/12/1977 FOBT Q 1 YEAR AGE 50-75 9/12/2006 BREAST CANCER SCRN MAMMOGRAM 3/20/2015 PAP AKA CERVICAL CYTOLOGY 8/27/2015 ZOSTER VACCINE AGE 60> 9/12/2016 INFLUENZA AGE 9 TO ADULT 8/1/2017 HEMOGLOBIN A1C Q6M 10/20/2017 LIPID PANEL Q1 11/6/2017 MICROALBUMIN Q1 5/11/2018 Allergies as of 6/27/2017  Review Complete On: 6/27/2017 By: Madie Caballero MD  
  
 Severity Noted Reaction Type Reactions Moxifloxacin  09/24/2015    Rash Pcn [Penicillins]    Swelling Sulfa (Sulfonamide Antibiotics)    Swelling Current Immunizations  Reviewed on 11/29/2012 Name Date Influenza Vaccine 10/10/2015, 12/29/2014 Influenza Vaccine Split 10/30/2012 Pneumococcal Polysaccharide (PPSV-23) 7/10/2015 Not reviewed this visit Vitals BP Pulse Temp Height(growth percentile) Weight(growth percentile) LMP  
 113/72 90 98.7 °F (37.1 °C) (Oral) 5' 5\" (1.651 m) 190 lb 4 oz (86.3 kg) 11/25/2012 BMI OB Status Smoking Status 31.66 kg/m2 Postmenopausal Former Smoker Vitals History BMI and BSA Data Body Mass Index Body Surface Area  
 31.66 kg/m 2 1.99 m 2 Preferred Pharmacy Pharmacy Name Phone [facility] [address] [phone] Your Updated Medication List  
  
 This list is accurate as of: 6/27/17 10:55 AM.  Always use your most recent med list.  
  
  
  
  
 ABILIFY 5 mg tablet Generic drug:  ARIPiprazole Take 10 mg by mouth daily. Indications: BIPOLAR DISORDER IN REMISSION  
  
 * albuterol 2.5 mg /3 mL (0.083 %) nebulizer solution Commonly known as:  PROVENTIL VENTOLIN  
USE 1 NEB EVERY 4 HOURS FOR WHEEZING AND SHORTNESS OF BREATH  Indications: CHRONIC OBSTRUCTIVE PULMONARY DISEASE * VENTOLIN HFA 90 mcg/actuation inhaler Generic drug:  albuterol INHALE 2 PUFFS EVERY 4 HOURS AS NEEDED  
  
 aspirin 81 mg chewable tablet Take 1 Tab by mouth daily (with breakfast). Indications: prevention of cerebrovascular accident  
  
 cholecalciferol 1,000 unit tablet Commonly known as:  VITAMIN D3 Take 2 Tabs by mouth daily. Indications: PREVENTION OF VITAMIN D DEFICIENCY  
  
 DEPAKOTE 250 mg tablet Generic drug:  divalproex DR Take 250 mg by mouth nightly. Indications: BIPOLAR DISORDER IN REMISSION  
  
 famotidine 20 mg tablet Commonly known as:  PEPCID Take 1 Tab by mouth daily. Indications: PREVENTION OF STRESS ULCER  
  
 febuxostat 40 mg Tab tablet Commonly known as:  [place] Take 1 Tab by mouth daily. Indications: GOUT PREVENTION  
  
 insulin glargine 100 unit/mL injection Commonly known as:  LANTUS Inject 70 units subcutaneously before breakfast (7AM) and 40 units subcutaneously after dinner (7PM). Indications: type 2 diabetes mellitus  
  
 insulin lispro 100 unit/mL injection Commonly known as:  HUMALOG To be given 15 min before meals: < 150 - None, 151-200 - 2 u, 201-250 - 4 u, 251-300 - 6 u, 301-350 - 8 u, 351-400 - 10 u, >400 - 12 u  
  
 insulin syringe,safetyneedle 1 mL 30 gauge x 5/16\" Syrg As directed  
  
 levothyroxine 100 mcg tablet Commonly known as:  SYNTHROID Take 1 Tab by mouth Daily (before breakfast). Indications: hypothyroidism METANX 2.8-2-25 mg Tab Generic drug:  l-methylfolate-vit b12-vit b6  
 Take 1 Tab by mouth daily. Nebulizer Accessories Kit Use with nebulizer machine NEURONTIN 300 mg capsule Generic drug:  gabapentin Take 300 mg by mouth three (3) times daily. Indications: NEUROPATHIC PAIN  
  
 oxyCODONE-acetaminophen 5-325 mg per tablet Commonly known as:  PERCOCET Take 1 Tab by mouth every six (6) hours as needed for Pain. Max Daily Amount: 4 Tabs. Indications: Pain Oxygen-Air Delivery Systems  
by Nasal route continuous. 2 LPM via NC  Indications: Hypoxemia requiring supplementary oxygen  
  
 rosuvastatin 5 mg tablet Commonly known as:  CRESTOR Take 1 Tab by mouth nightly. tolterodine 1 mg tablet Commonly known as:  [place] Take 1 Tab by mouth two (2) times a day. Indications: URINARY URGE INCONTINENCE  
  
 traZODone 100 mg tablet Commonly known as:  [place] Take 100 mg by mouth nightly. Indications: major depressive disorder TYLENOL 325 mg tablet Generic drug:  acetaminophen Take 325 mg by mouth every six (6) hours as needed for Pain. Indications: Fever, Pain  
  
 umeclidinium-vilanterol 62.5-25 mcg/actuation inhaler Commonly known as:  [place]ur[place] Take 1 Puff by inhalation daily. ZOLOFT 100 mg tablet Generic drug:  sertraline Take 50 mg by mouth daily. Indications: DEPRESSION ASSOCIATED WITH BIPOLAR DISORDER * Notice: This list has 2 medication(s) that are the same as other medications prescribed for you. Read the directions carefully, and ask your doctor or other care provider to review them with you. Please provide this summary of care documentation to your next provider. Your primary care clinician is listed as [address] [place] [address] Ave. If you have any questions after today's visit, please call [phone].

## 2017-07-25 ENCOUNTER — OFFICE VISIT (OUTPATIENT)
Dept: ORTHOPEDIC SURGERY | Age: 62
End: 2017-07-25

## 2017-07-25 VITALS
RESPIRATION RATE: 20 BRPM | BODY MASS INDEX: 30.75 KG/M2 | TEMPERATURE: 98.2 F | SYSTOLIC BLOOD PRESSURE: 104 MMHG | HEIGHT: 65 IN | DIASTOLIC BLOOD PRESSURE: 66 MMHG | HEART RATE: 89 BPM | OXYGEN SATURATION: 95 % | WEIGHT: 184.6 LBS

## 2017-07-25 DIAGNOSIS — M25.562 CHRONIC PAIN OF BOTH KNEES: ICD-10-CM

## 2017-07-25 DIAGNOSIS — M25.561 CHRONIC PAIN OF BOTH KNEES: ICD-10-CM

## 2017-07-25 DIAGNOSIS — G89.29 CHRONIC PAIN OF BOTH KNEES: ICD-10-CM

## 2017-07-25 DIAGNOSIS — M17.0 PRIMARY OSTEOARTHRITIS OF BOTH KNEES: Primary | ICD-10-CM

## 2017-07-25 RX ORDER — TRAMADOL HYDROCHLORIDE 50 MG/1
TABLET ORAL
COMMUNITY
Start: 2017-06-26 | End: 2019-02-17

## 2017-07-25 NOTE — MR AVS SNAPSHOT
Visit Information Date & Time Provider Department Dept. Phone Encounter #  
 7/25/2017 10:20 AM Pavan Mckenna MD [facility] [phone] [id number] Your Appointments 8/24/2017 10:00 AM  
ESTABLISHED PATIENT with You Castillo MD  
[facility]) Appt Note: 6 months [address], [address] [place] [address] [place], [address] [place] [address] Upcoming Health Maintenance Date Due  
 FOOT EXAM Q1 9/12/1966 EYE EXAM RETINAL OR DILATED Q1 9/12/1966 DTaP/Tdap/Td series (1 - Tdap) 9/12/1977 FOBT Q 1 YEAR AGE 50-75 9/12/2006 BREAST CANCER SCRN MAMMOGRAM 3/20/2015 PAP AKA CERVICAL CYTOLOGY 8/27/2015 ZOSTER VACCINE AGE 60> 7/12/2016 INFLUENZA AGE 9 TO ADULT 8/1/2017 HEMOGLOBIN A1C Q6M 10/20/2017 LIPID PANEL Q1 11/6/2017 MICROALBUMIN Q1 5/11/2018 Allergies as of 7/25/2017  Review Complete On: 6/27/2017 By: Pavan Mckenna MD  
  
 Severity Noted Reaction Type Reactions Moxifloxacin  09/24/2015    Rash Pcn [Penicillins]    Swelling Sulfa (Sulfonamide Antibiotics)    Swelling Current Immunizations  Reviewed on 11/29/2012 Name Date Influenza Vaccine 10/10/2015, 12/29/2014 Influenza Vaccine Split 10/30/2012 Pneumococcal Polysaccharide (PPSV-23) 7/10/2015 Not reviewed this visit You Were Diagnosed With   
  
 Codes Comments Chronic pain of both knees    -  Primary ICD-10-CM: M25.561, M25.562, G89.29 ICD-9-CM: 719.46, 338.29 Vitals BP Pulse Temp Resp Height(growth percentile) Weight(growth percentile) 104/66 89 98.2 °F (36.8 °C) (Oral) 20 5' 5\" (1.651 m) 184 lb 9.6 oz (83.7 kg) LMP SpO2 BMI OB Status Smoking Status 11/25/2012 95% 30.72 kg/m2 Postmenopausal Former Smoker BMI and BSA Data Body Mass Index Body Surface Area  30.72 kg/m 2 1.96 m 2  
  
  
 Preferred Pharmacy Pharmacy Name [facility] [phone] Your Updated Medication List  
  
   
This list is accurate as of: 7/25/17 11:42 AM.  Always use your most recent med list.  
  
  
  
  
 ABILIFY 5 mg tablet Generic drug:  ARIPiprazole Take 10 mg by mouth daily. Indications: BIPOLAR DISORDER IN REMISSION  
  
 * albuterol 2.5 mg /3 mL (0.083 %) nebulizer solution Commonly known as:  PROVENTIL VENTOLIN  
USE 1 NEB EVERY 4 HOURS FOR WHEEZING AND SHORTNESS OF BREATH  Indications: CHRONIC OBSTRUCTIVE PULMONARY DISEASE * VENTOLIN HFA 90 mcg/actuation inhaler Generic drug:  albuterol INHALE 2 PUFFS EVERY 4 HOURS AS NEEDED  
  
 aspirin 81 mg chewable tablet Take 1 Tab by mouth daily (with breakfast). Indications: prevention of cerebrovascular accident  
  
 cholecalciferol 1,000 unit tablet Commonly known as:  VITAMIN D3 Take 2 Tabs by mouth daily. Indications: PREVENTION OF VITAMIN D DEFICIENCY  
  
 DEPAKOTE 250 mg tablet Generic drug:  divalproex DR Take 250 mg by mouth nightly. Indications: BIPOLAR DISORDER IN REMISSION  
  
 famotidine 20 mg tablet Commonly known as:  PEPCID Take 1 Tab by mouth daily. Indications: PREVENTION OF STRESS ULCER  
  
 febuxostat 40 mg Tab tablet Commonly known as:  Sebastian Sulma Take 1 Tab by mouth daily. Indications: GOUT PREVENTION  
  
 insulin glargine 100 unit/mL injection Commonly known as:  LANTUS Inject 70 units subcutaneously before breakfast (7AM) and 40 units subcutaneously after dinner (7PM). Indications: type 2 diabetes mellitus  
  
 insulin lispro 100 unit/mL injection Commonly known as:  HUMALOG To be given 15 min before meals: < 150 - None, 151-200 - 2 u, 201-250 - 4 u, 251-300 - 6 u, 301-350 - 8 u, 351-400 - 10 u, >400 - 12 u  
  
 insulin syringe,safetyneedle 1 mL 30 gauge x 5/16\" Syrg As directed  
  
 levothyroxine 100 mcg tablet Commonly known as:  SYNTHROID Take 1 Tab by mouth Daily (before breakfast). Indications: hypothyroidism METANX 2.8-2-25 mg Tab Generic drug:  l-methylfolate-vit b12-vit b6 Take 1 Tab by mouth daily. Nebulizer Accessories Kit Use with nebulizer machine NEURONTIN 300 mg capsule Generic drug:  gabapentin Take 300 mg by mouth three (3) times daily. Indications: NEUROPATHIC PAIN  
  
 oxyCODONE-acetaminophen 5-325 mg per tablet Commonly known as:  PERCOCET Take 1 Tab by mouth every six (6) hours as needed for Pain. Max Daily Amount: 4 Tabs. Indications: Pain Oxygen-Air Delivery Systems  
by Nasal route continuous. 2 LPM via NC  Indications: Hypoxemia requiring supplementary oxygen  
  
 rosuvastatin 5 mg tablet Commonly known as:  CRESTOR Take 1 Tab by mouth nightly. tolterodine 1 mg tablet Commonly known as:  Debby Dallas Take 1 Tab by mouth two (2) times a day. Indications: URINARY URGE INCONTINENCE  
  
 traMADol 50 mg tablet Commonly known as:  ULTRAM  
  
 traZODone 100 mg tablet Commonly known as:  Ijeoma Faes Take 100 mg by mouth nightly. Indications: major depressive disorder TYLENOL 325 mg tablet Generic drug:  acetaminophen Take 325 mg by mouth every six (6) hours as needed for Pain. Indications: Fever, Pain  
  
 umeclidinium-vilanterol 62.5-25 mcg/actuation inhaler Commonly known as:  [place] Take 1 Puff by inhalation daily. ZOLOFT 100 mg tablet Generic drug:  sertraline Take 50 mg by mouth daily. Indications: DEPRESSION ASSOCIATED WITH BIPOLAR DISORDER * Notice: This list has 2 medication(s) that are the same as other medications prescribed for you. Read the directions carefully, and ask your doctor or other care provider to review them with you. We Performed the Following AMB POC XRAY, KNEE; COMPLETE, 4+ VIEW [38815 CPT(R)] AMB POC XRAY, KNEE; COMPLETE, 4+ VIEW [00101 CPT(R)] REFERRAL TO ORTHOPEDICS [BQU858 Custom] To-Do List   
 07/25/2017 Imaging:  MRI KNEE LT WO CONT   
  
 07/25/2017 Imaging:  MRI KNEE RT WO CONT Referral Information Referral ID Referred By Referred To  
  
 [id number] Jaydon RODRIGUEZ Not Available Visits Status Start Date End Date 1 New Request 7/25/17 7/25/18 If your referral has a status of pending review or denied, additional information will be sent to support the outcome of this decision. Referral ID Referred By Referred To  
 [id number] Jaydon RODRIGUEZ Not Available Visits Status Start Date End Date 1 New Request 7/25/17 7/25/18 If your referral has a status of pending review or denied, additional information will be sent to support the outcome of this decision. Referral ID Referred By Referred To  
 [id number] Leonardo LANE MD  
   [address] [place] [address]  
   [address] Phone: [phone] Fax: [phone] Visits Status Start Date End Date 1 New Request 7/25/17 7/25/18 If your referral has a status of pending review or denied, additional information will be sent to support the outcome of this decision. Patient Instructions Please follow up after MRI. You are advised to contact us if your condition worsens. An MRI or CT has been ordered for you. A [facility] will be contacting you to schedule the appointment as soon as it has been approved with your insurance company. Please schedule an appointment to follow up with the doctor or the physicians assistant after the MRI or CT has been conducted. Knee Arthritis: Care Instructions Your Care Instructions Knee arthritis is a breakdown of the cartilage that cushions your knee joint. When the cartilage wears down, your bones rub against each other. This causes pain and stiffness. Knee arthritis tends to get worse with time. Treatment for knee arthritis involves reducing pain, making the leg muscles stronger, and staying at a healthy body weight. The treatment usually does not improve the health of the cartilage, but it can reduce pain and improve how well your knee works. You can take simple measures to protect your knee joints, ease your pain, and help you stay active. Follow-up care is a key part of your treatment and safety. Be sure to make and go to all appointments, and call your doctor if you are having problems. It's also a good idea to know your test results and keep a list of the medicines you take. How can you care for yourself at home? · Know that knee arthritis will cause more pain on some days than on others. · Stay at a healthy weight. Lose weight if you are overweight. When you stand up, the pressure on your knees from every pound of body weight is multiplied four times. So if you lose 10 pounds, you will reduce the pressure on your knees by 40 pounds. · Talk to your doctor or physical therapist about exercises that will help ease joint pain. ¨ Stretch to help prevent stiffness and to prevent injury before you exercise. You may enjoy gentle forms of yoga to help keep your knee joints and muscles flexible. ¨ Walk instead of jog. ¨ Ride a bike. This makes your thigh muscles stronger and takes pressure off your knee. ¨ Wear well-fitting and comfortable shoes. ¨ Exercise in chest-deep water. This can help you exercise longer with less pain. ¨ Avoid exercises that include squatting or kneeling. They can put a lot of strain on your knees. ¨ Talk to your doctor to make sure that the exercise you do is not making the arthritis worse. · Do not sit for long periods of time. Try to walk once in a while to keep your knee from getting stiff. · Ask your doctor or physical therapist whether shoe inserts may reduce your arthritis pain. · If you can afford it, get new athletic shoes at least every year.  This can help reduce the strain on your knees. · Use a device to help you do everyday activities. ¨ A cane or walking stick can help you keep your balance when you walk. Hold the cane or walking stick in the hand opposite the painful knee. ¨ If you feel like you may fall when you walk, try using crutches or a front-wheeled walker. These can prevent falls that could cause more damage to your knee. ¨ A knee brace may help keep your knee stable and prevent pain. ¨ You also can use other things to make life easier, such as a higher toilet seat and handrails in the bathtub or shower. · Take pain medicines exactly as directed. ¨ Do not wait until you are in severe pain. You will get better results if you take it sooner. ¨ If you are not taking a prescription pain medicine, take an over-the-counter medicine such as acetaminophen (Tylenol), ibuprofen (Advil, Motrin), or naproxen (Aleve). Read and follow all instructions on the label. ¨ Do not take two or more pain medicines at the same time unless the doctor told you to. Many pain medicines have acetaminophen, which is Tylenol. Too much acetaminophen (Tylenol) can be harmful. ¨ Tell your doctor if you take a blood thinner, have diabetes, or have allergies to shellfish. · Ask your doctor if you might benefit from a shot of steroid medicine into your knee. This may provide pain relief for several months. · Many people take the supplements glucosamine and chondroitin for osteoarthritis. Some people feel they help, but the medical research does not show that they work. Talk to your doctor before you take these supplements. When should you call for help? Call your doctor now or seek immediate medical care if: 
· You have sudden swelling, warmth, or pain in your knee. · You have knee pain and a fever or rash. · You have such bad pain that you cannot use your knee. Watch closely for changes in your health, and be sure to contact your doctor if you have any problems. Where can you learn more? Go to http://rick-kathya.info/. Enter V339 in the search box to learn more about \"Knee Arthritis: Care Instructions. \" Current as of: October 31, 2016 Content Version: 11.3 © 1962-4272 [employer]ap. Care instructions adapted under license by [employer] (which disclaims liability or warranty for this information). If you have questions about a medical condition or this instruction, always ask your healthcare professional. [place] [address] any warranty or liability for your use of this information. Please provide this summary of care documentation to your next provider. Your primary care clinician is listed as Dagoberto Saucedo. If you have any questions after today's visit, please call [phone].

## 2017-07-25 NOTE — PROGRESS NOTES
AMBULATORY PROGRESS NOTE      Patient: Eda Huang             MRN: [id number]     SSN: [id number] Body mass index is 30.72 kg/(m^2). YOB: 1955     AGE: 62 y.o. EX: female    PCP: Dayne Koenig MD    IMPRESSION/DIAGNOSIS AND TREATMENT PLAN     DIAGNOSES  1. Primary osteoarthritis of both knees    2. Chronic pain of both knees        Orders Placed This Encounter    [71282] Knee 4V    [20618] Knee 4V    MRI KNEE RT WO CONT    MRI KNEE LT WO CONT    REFERRAL TO ORTHOPEDICS      Stephen Sinha understands her diagnoses and the proposed plan. Plan:    1) MRI: Right knee and Left Knee  2) Referral to Dr. Blaze Ragsdale  3) Continue taking medication to treat Gout as prescribed by Dr. Charleen Barajas - After MRI    HPI AND EXAMINATION     Stephen Sinha IS A 62 y.o. female who presents to my outpatient office for follow up of left hand/elbow pain. Patient was seen in the ED 5/10/17 following a fall on her left elbow. At last visit, I ordered to continue activities as tolerated. The patient presents to the office today wearing slip-on dress shoes. She reports having some pain and stiffness to both of her knees. She was recently administered to the hospital for Gout. She received blood tests and her Uric Acid was elevated. She is currently taking medication to treat the gout. Ms. Immanuel Alcantara had been taking steroids for some time and has discontinued them now. She sees a Rheumatologist, Dr. Sahara Delcid who is treating her Gout. Appearance: Alert, well appearing and pleasant patient who is in no distress, oriented to person, place/time, and who follows commands. This patient is accompanied in the       office by her  self. Psychiatric: Affect and mood are appropriate.    Cardiovascular/Peripheral Vascular: Normal Pulses to each hand and foot           Knees:  Bilateral        Gait: slow         Cutaneous: Skin intact, no abrasions, blisters, wounds, erythema        Effusion: Is Not present Crepitus:  moderate Right PF joint crepitus       Tenderness:None    Alignment of Knee: no neutral when standing        ROM: full range of motion        Fullness or swelling: None to popliteal fossa region        Stability: No instability to anterior, posterior, varus, valgus stress testing        Trust: No varus trust with gait        Contractures: No Achilles or Gastrocnemius Contractures. Calf tenderness: Absent for calf or gastrocnemius muscle regions            Soft, supple, non tender, non taut lower extremity compartments  Extremities:   No embolic phenomena to the toes          No significant edema to the foot and or toes. Edema is not present to distal 1/3 tib/fib or ankle regions. Lower extremities are warm and appear well perfused    DVT: No evidence of DVT seen on examination at this time     No calf swelling, no tenderness to calf muscles  Lymphatic:  No Evidence of Lymphedema  Vascular: Medial Border of Tibia Region: Edema is not present        Pulses: Dorsalis Pedis &  Posterior Tibial Pulses : Palpable yes        Varicosities Lower Limbs: None noted . Neuro: Negative bilateral Straight leg raise (seated position)    See Musculoskeletal section for pertinent individual extremity examination    No abnormal hand/wrist, foot/ankle, or facial/neck tremors.     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 congestive heart failure     Better controlled     Bipolar affective disorder ([facility] [address] 12/5/2012    Cellulitis of right forearm 5/4/2017    Chronic back pain     Chronic obstructive pulmonary disease (COPD) (HCC)     SOB, on paula O2 Recent admission with psychosis     CKD stage G3a/A1, GFR 45-59 and albumin creatinine ratio <30 mg/g 5/11/2017    Urine microalbumin/Creatinine ratio (5/11/2017) = 9 mg/g    Contusion of left elbow 5/4/2017    Depression     Diabetic neuropathy associated with type 2 diabetes mellitus (HCC)     Diastolic dysfunction without heart failure     Stable on diuretics     Falls frequently     Gastroesophageal reflux disease with hiatal hernia     Generalized osteoarthritis of multiple sites     History of acute renal failure 5/31/2013    History of back injury     jumped out of second story window     History of hepatitis C     treated    History of penicillin allergy     History of vitamin D deficiency 5/10/2017    Hyperuricemia 5/26/2017    Hypothyroidism     Hypoxemia requiring supplemental oxygen 12/29/2014    Intravenous drug user 5/2/2017    Memory difficulty     Mixed connective tissue disease ([facility] [address] 5/10/2017    Obesity, Class I, BMI 30-34.9     Olecranon bursitis of right elbow 5/4/2017    Recurrent genital herpes 5/31/2013    Right Achilles tendinitis 5/2/2017    Sarcoidosis ([facility] [address]     Sickle cell trait (HCC)     Type 2 diabetes with stage 3 chronic kidney disease GFR 30-59 ([facility])     Urge urinary incontinence 5/10/2017    Venous insufficiency     Wears glasses      Current Outpatient Prescriptions   Medication Sig    traMADol (ULTRAM) 50 mg tablet     famotidine (PEPCID) 20 mg tablet Take 1 Tab by mouth daily. Indications: PREVENTION OF STRESS ULCER    febuxostat (ULORIC) 40 mg tab tablet Take 1 Tab by mouth daily. Indications: GOUT PREVENTION    insulin lispro (HUMALOG) 100 unit/mL injection To be given 15 min before meals: < 150 - None, 151-200 - 2 u, 201-250 - 4 u, 251-300 - 6 u, 301-350 - 8 u, 351-400 - 10 u, >400 - 12 u    aspirin 81 mg chewable tablet Take 1 Tab by mouth daily (with breakfast).  Indications: prevention of cerebrovascular accident    levothyroxine (SYNTHROID) 100 mcg tablet Take 1 Tab by mouth Daily (before breakfast). Indications: hypothyroidism    tolterodine (DETROL) 1 mg tablet Take 1 Tab by mouth two (2) times a day. Indications: URINARY URGE INCONTINENCE    cholecalciferol (VITAMIN D3) 1,000 unit tablet Take 2 Tabs by mouth daily. Indications: PREVENTION OF VITAMIN D DEFICIENCY    umeclidinium-vilanterol (ANORO ELLIPTA) 62.5-25 mcg/actuation inhaler Take 1 Puff by inhalation daily.  VENTOLIN HFA 90 mcg/actuation inhaler INHALE 2 PUFFS EVERY 4 HOURS AS NEEDED    divalproex DR (DEPAKOTE) 250 mg tablet Take 250 mg by mouth nightly. Indications: BIPOLAR DISORDER IN REMISSION    insulin syringe,safetyneedle 1 mL 30 gauge x 5/16\" syrg As directed    ARIPiprazole (ABILIFY) 5 mg tablet Take 10 mg by mouth daily. Indications: BIPOLAR DISORDER IN REMISSION    albuterol (PROVENTIL VENTOLIN) 2.5 mg /3 mL (0.083 %) nebulizer solution USE 1 NEB EVERY 4 HOURS FOR WHEEZING AND SHORTNESS OF BREATH  Indications: CHRONIC OBSTRUCTIVE PULMONARY DISEASE    gabapentin (NEURONTIN) 300 mg capsule Take 300 mg by mouth three (3) times daily. Indications: NEUROPATHIC PAIN    acetaminophen (TYLENOL) 325 mg tablet Take 325 mg by mouth every six (6) hours as needed for Pain. Indications: Fever, Pain    traZODone (DESYREL) 100 mg tablet Take 100 mg by mouth nightly. Indications: major depressive disorder    rosuvastatin (CRESTOR) 5 mg tablet Take 1 Tab by mouth nightly.  l-methylfolate-vit b12-vit b6 (METANX) 2.8-2-25 mg Tab Take 1 Tab by mouth daily.  Nebulizer Accessories Kit Use with nebulizer machine    sertraline (ZOLOFT) 100 mg tablet Take 50 mg by mouth daily. Indications: DEPRESSION ASSOCIATED WITH BIPOLAR DISORDER    Oxygen-Air Delivery Systems by Nasal route continuous. 2 LPM via NC  Indications: Hypoxemia requiring supplementary oxygen    insulin glargine (LANTUS) 100 unit/mL injection Inject 70 units subcutaneously before breakfast (7AM) and 40 units subcutaneously after dinner (7PM).   Indications: type 2 diabetes mellitus    oxyCODONE-acetaminophen (PERCOCET) 5-325 mg per tablet Take 1 Tab by mouth every six (6) hours as needed for Pain. Max Daily Amount: 4 Tabs. Indications: Pain     No current facility-administered medications for this visit. Allergies   Allergen Reactions    Moxifloxacin Rash    Pcn [Penicillins] Swelling    Sulfa (Sulfonamide Antibiotics) Swelling     Past Surgical History:   Procedure Laterality Date    HX BACK SURGERY  1986    HX  SECTION      HX CYST REMOVAL Left 1979    Left foot    HX OTHER SURGICAL Left 2017    S/P incision and drainage of the abscess of the left hand and the left foot (2017 - Dr. Kia Edwards. [place])    HX TUBAL LIGATION       Social History     Occupational History    Not employed Not Employed     Social History Main Topics    Smoking status: Former Smoker     Packs/day: 0.50     Years: 30.00     Types: Cigarettes     Start date: 1969     Quit date: 10/31/2016    Smokeless tobacco: Never Used      Comment: Per pt. 10 a day     Alcohol use No    Drug use: No      Comment: +Cocaine Screen     Sexual activity: Not on file      Comment: 2014     Family History   Problem Relation Age of Onset    Seizures Other     Diabetes Mother     Hypertension Mother     Heart Disease Mother     Cancer Mother     Diabetes Father     Stroke Father     Heart Disease Father     Headache Sister     Depression Neg Hx     Suicide Neg Hx     Psychotic Disorder Neg Hx     Substance Abuse Neg Hx     Dementia Neg Hx        REVIEW OF SYSTEMS : 2017  ALL BELOW ARE Negative except : SEE HPI       Constitutional: Negative for fever, chills and weight loss. Neg Weigh Loss  Cardiovascular: Negative for chest pain, claudication and leg swelling. SOB, SNYDER   Gastrointestinal: Negative for  pain, N/V/D/C, Blood in stool or urine,dysuria, hematuria,        Incontinence, pelvic pain  Musculoskeletal: see HPI.   Neurological: Negative for dizziness and weakness. Negative for headaches,Visual Changes, Confusion, Seizures,   Psychiatric/Behavioral: Negative for depression, memory loss and substance abuse. Extremities:  Negative for  hair changes, rash or skin lesion changes. Hematologic: Negative for Bleeding problems, bruising, pallor or swollen lymph nodes. Peripheral Vascular: No calf pain, vascular vein tenderness to calf pain              No calf throbbing, posterior knee throbbing pain    DIAGNOSTIC IMAGING      Dictation on: 07/25/2017 11:19 AM by: Gemma Forbes [[id number]]         Written by Kenroy Melissa, as dictated by El Morris MD. I, , El Morris MD, confirm that all documentation is accurate.

## 2017-07-25 NOTE — PROCEDURES
X-rays of the right knee, AP, lateral, patellofemoral, and PA views: These films reveal there are serpiginous type of calcium deposits in the right distal femur and right proximal tibia possibly consistent with either avascular necrosis or bony infarct. There are some osteoarthritic changes seen to the medial and lateral compartments of the right knee and more so in severity to the lateral patellofemoral facet region, which is irregularity in the joint surface, so there is osteoarthritis in this region. The left knee, three views, AP, lateral, and oblique to include an additional PA flexion view, shows the same serpiginous calcium type deposits to the distal femur and proximal tibia, again concerning for either bony infarct or avascular necrosis. There is some calcification of the lateral meniscus of this left knee and some OA of the patellofemoral joint articulation, mild. My overall impression is bilateral distal femur and proximal tibial regions with serpiginous calcium deposits possibly concerning for avascular necrosis or bony infarct with degenerative changes to the right patellofemoral joint articulation and calcific chondrocalcinosis, degenerative to the lateral meniscus of the left knee.

## 2017-07-25 NOTE — PATIENT INSTRUCTIONS
Please follow up after MRI. You are advised to contact us if your condition worsens. An MRI or CT has been ordered for you. A [employer] will be contacting you to schedule the appointment as soon as it has been approved with your insurance company. Please schedule an appointment to follow up with the doctor or the physicians assistant after the MRI or CT has been conducted. Knee Arthritis: Care Instructions  Your Care Instructions  Knee arthritis is a breakdown of the cartilage that cushions your knee joint. When the cartilage wears down, your bones rub against each other. This causes pain and stiffness. Knee arthritis tends to get worse with time. Treatment for knee arthritis involves reducing pain, making the leg muscles stronger, and staying at a healthy body weight. The treatment usually does not improve the health of the cartilage, but it can reduce pain and improve how well your knee works. You can take simple measures to protect your knee joints, ease your pain, and help you stay active. Follow-up care is a key part of your treatment and safety. Be sure to make and go to all appointments, and call your doctor if you are having problems. It's also a good idea to know your test results and keep a list of the medicines you take. How can you care for yourself at home? · Know that knee arthritis will cause more pain on some days than on others. · Stay at a healthy weight. Lose weight if you are overweight. When you stand up, the pressure on your knees from every pound of body weight is multiplied four times. So if you lose 10 pounds, you will reduce the pressure on your knees by 40 pounds. · Talk to your doctor or physical therapist about exercises that will help ease joint pain. ¨ Stretch to help prevent stiffness and to prevent injury before you exercise. You may enjoy gentle forms of yoga to help keep your knee joints and muscles flexible. ¨ Walk instead of jog. ¨ Ride a bike.  This makes your thigh muscles stronger and takes pressure off your knee. ¨ Wear well-fitting and comfortable shoes. ¨ Exercise in chest-deep water. This can help you exercise longer with less pain. ¨ Avoid exercises that include squatting or kneeling. They can put a lot of strain on your knees. ¨ Talk to your doctor to make sure that the exercise you do is not making the arthritis worse. · Do not sit for long periods of time. Try to walk once in a while to keep your knee from getting stiff. · Ask your doctor or physical therapist whether shoe inserts may reduce your arthritis pain. · If you can afford it, get new athletic shoes at least every year. This can help reduce the strain on your knees. · Use a device to help you do everyday activities. ¨ A cane or walking stick can help you keep your balance when you walk. Hold the cane or walking stick in the hand opposite the painful knee. ¨ If you feel like you may fall when you walk, try using crutches or a front-wheeled walker. These can prevent falls that could cause more damage to your knee. ¨ A knee brace may help keep your knee stable and prevent pain. ¨ You also can use other things to make life easier, such as a higher toilet seat and handrails in the bathtub or shower. · Take pain medicines exactly as directed. ¨ Do not wait until you are in severe pain. You will get better results if you take it sooner. ¨ If you are not taking a prescription pain medicine, take an over-the-counter medicine such as acetaminophen (Tylenol), ibuprofen (Advil, Motrin), or naproxen (Aleve). Read and follow all instructions on the label. ¨ Do not take two or more pain medicines at the same time unless the doctor told you to. Many pain medicines have acetaminophen, which is Tylenol. Too much acetaminophen (Tylenol) can be harmful. ¨ Tell your doctor if you take a blood thinner, have diabetes, or have allergies to shellfish.   · Ask your doctor if you might benefit from a shot of steroid medicine into your knee. This may provide pain relief for several months. · Many people take the supplements glucosamine and chondroitin for osteoarthritis. Some people feel they help, but the medical research does not show that they work. Talk to your doctor before you take these supplements. When should you call for help? Call your doctor now or seek immediate medical care if:  · You have sudden swelling, warmth, or pain in your knee. · You have knee pain and a fever or rash. · You have such bad pain that you cannot use your knee. Watch closely for changes in your health, and be sure to contact your doctor if you have any problems. Where can you learn more? Go to http://rick-kathya.info/. Enter R965 in the search box to learn more about \"Knee Arthritis: Care Instructions. \"  Current as of: October 31, 2016  Content Version: 11.3  © 8194-4399 [employer]. Care instructions adapted under license by [employer] (which disclaims liability or warranty for this information). If you have questions about a medical condition or this instruction, always ask your healthcare professional. [address] any warranty or liability for your use of this information.

## 2017-07-28 ENCOUNTER — HOSPITAL ENCOUNTER (OUTPATIENT)
Dept: LAB | Age: 62
Discharge: HOME | End: 2017-07-28

## 2017-07-28 LAB — SENTARA SPECIMEN COL,SENBCF: NORMAL

## 2017-07-28 PROCEDURE — 99001 SPECIMEN HANDLING PT-LAB: CPT | Performed by: INTERNAL MEDICINE

## 2017-08-02 ENCOUNTER — HOSPITAL ENCOUNTER (OUTPATIENT)
Dept: MRI IMAGING | Age: 62
End: 2017-08-02
Attending: ORTHOPAEDIC SURGERY
Payer: MEDICAID

## 2017-08-02 ENCOUNTER — HOSPITAL ENCOUNTER (OUTPATIENT)
Dept: MRI IMAGING | Age: 62
Discharge: HOME | End: 2017-08-02
Attending: ORTHOPAEDIC SURGERY
Payer: MEDICAID

## 2017-08-02 DIAGNOSIS — G89.29 CHRONIC PAIN OF BOTH KNEES: ICD-10-CM

## 2017-08-02 DIAGNOSIS — M25.561 CHRONIC PAIN OF BOTH KNEES: ICD-10-CM

## 2017-08-02 DIAGNOSIS — M25.562 CHRONIC PAIN OF BOTH KNEES: ICD-10-CM

## 2017-08-02 PROCEDURE — 73721 MRI JNT OF LWR EXTRE W/O DYE: CPT

## 2017-08-24 ENCOUNTER — OFFICE VISIT (OUTPATIENT)
Dept: CARDIOLOGY CLINIC | Age: 62
End: 2017-08-24

## 2017-08-24 VITALS
WEIGHT: 177 LBS | SYSTOLIC BLOOD PRESSURE: 109 MMHG | HEIGHT: 65 IN | DIASTOLIC BLOOD PRESSURE: 70 MMHG | BODY MASS INDEX: 29.49 KG/M2 | HEART RATE: 92 BPM

## 2017-08-24 DIAGNOSIS — I50.32 DIASTOLIC CHF, CHRONIC (HCC): Primary | ICD-10-CM

## 2017-08-24 DIAGNOSIS — I13.10 BENIGN HYPERTENSIVE HEART AND KIDNEY DISEASE WITH STAGE 3 CHRONIC KIDNEY DISEASE WITHOUT CONGESTIVE HEART FAILURE (HCC): Chronic | ICD-10-CM

## 2017-08-24 DIAGNOSIS — R94.39 ABNORMAL NUCLEAR STRESS TEST: ICD-10-CM

## 2017-08-24 DIAGNOSIS — E78.5 HYPERLIPIDEMIA, UNSPECIFIED HYPERLIPIDEMIA TYPE: ICD-10-CM

## 2017-08-24 DIAGNOSIS — N18.30 BENIGN HYPERTENSIVE HEART AND KIDNEY DISEASE WITH STAGE 3 CHRONIC KIDNEY DISEASE WITHOUT CONGESTIVE HEART FAILURE (HCC): Chronic | ICD-10-CM

## 2017-08-24 DIAGNOSIS — J43.9 PULMONARY EMPHYSEMA, UNSPECIFIED EMPHYSEMA TYPE (HCC): ICD-10-CM

## 2017-08-24 RX ORDER — CLOPIDOGREL BISULFATE 75 MG/1
TABLET ORAL
COMMUNITY
End: 2018-08-09 | Stop reason: ALTCHOICE

## 2017-08-24 RX ORDER — PREDNISONE 2.5 MG/1
TABLET ORAL
COMMUNITY
End: 2017-09-12 | Stop reason: DRUGHIGH

## 2017-08-24 NOTE — PROGRESS NOTES
HISTORY OF PRESENT ILLNESS  Shawanda Schulz is a 62 y.o. female. CHF   The history is provided by the patient. This is a chronic problem. The problem occurs constantly. The problem has not changed since onset. Associated symptoms include shortness of breath. Pertinent negatives include no chest pain. The symptoms are aggravated by exertion. The symptoms are relieved by rest.   Hypertension   The history is provided by the patient. This is a chronic problem. The problem occurs constantly. The problem has not changed since onset. Associated symptoms include shortness of breath. Pertinent negatives include no chest pain. Nothing aggravates the symptoms. Cholesterol Problem   The history is provided by the patient. This is a chronic problem. The problem occurs constantly. The problem has not changed since onset. Associated symptoms include shortness of breath. Pertinent negatives include no chest pain. Shortness of Breath   The history is provided by the patient. This is a recurrent problem. The problem occurs frequently. The problem has been gradually improving. Associated symptoms include leg swelling. Pertinent negatives include no fever, no cough, no sputum production, no hemoptysis, no wheezing, no PND, no orthopnea, no chest pain, no vomiting, no rash and no claudication. Associated medical issues include COPD and heart failure. Leg Swelling   The history is provided by the patient. This is a recurrent problem. The problem occurs every several days. Associated symptoms include shortness of breath. Pertinent negatives include no chest pain.      Family History   Problem Relation Age of Onset    Seizures Other     Diabetes Mother     Hypertension Mother     Heart Disease Mother     Cancer Mother     Diabetes Father     Stroke Father     Heart Disease Father     Headache Sister     Depression Neg Hx     Suicide Neg Hx     Psychotic Disorder Neg Hx     Substance Abuse Neg Hx     Dementia Neg Hx 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 congestive heart failure     Better controlled     Bipolar affective disorder ([address] 12/5/2012    Cellulitis of right forearm 5/4/2017    Chronic back pain     Chronic obstructive pulmonary disease (COPD) (HCC)     SOB, on paula O2 Recent admission with psychosis     CKD stage G3a/A1, GFR 45-59 and albumin creatinine ratio <30 mg/g 5/11/2017    Urine microalbumin/Creatinine ratio (5/11/2017) = 9 mg/g    Contusion of left elbow 5/4/2017    Depression     Diabetic neuropathy associated with type 2 diabetes mellitus (HCC)     Diastolic dysfunction without heart failure     Stable on diuretics     Falls frequently     Gastroesophageal reflux disease with hiatal hernia     Generalized osteoarthritis of multiple sites     History of acute renal failure 5/31/2013    History of back injury     jumped out of second story window     History of hepatitis C     treated    History of penicillin allergy     History of vitamin D deficiency 5/10/2017    Hyperuricemia 5/26/2017    Hypothyroidism     Hypoxemia requiring supplemental oxygen 12/29/2014    Intravenous drug user 5/2/2017    Memory difficulty     Mixed connective tissue disease ([address] 5/10/2017    Obesity, Class I, BMI 30-34.9     Olecranon bursitis of right elbow 5/4/2017    Recurrent genital herpes 5/31/2013    Right Achilles tendinitis 5/2/2017    Sarcoidosis ([address]     Sickle cell trait ([address]     Type 2 diabetes with stage 3 chronic kidney disease GFR 30-59 ([address]     Urge urinary incontinence 5/10/2017    Venous insufficiency     Wears glasses        Past Surgical History:   Procedure Laterality Date    HX BACK SURGERY  1986    HX  SECTION      HX CYST REMOVAL Left 1979    Left foot    HX OTHER SURGICAL Left 2017    S/P incision and drainage of the abscess of the left hand and the left foot (2017 - Dr. Opal Espanak Stewart)    HX TUBAL LIGATION         Allergies   Allergen Reactions    Moxifloxacin Rash    Pcn [Penicillins] Swelling    Sulfa (Sulfonamide Antibiotics) Swelling       Current Outpatient Prescriptions   Medication Sig    clopidogrel (PLAVIX) 75 mg tab Take  by mouth.  predniSONE (DELTASONE) 2.5 mg tablet Take  by mouth daily (with breakfast).  traMADol (ULTRAM) 50 mg tablet     famotidine (PEPCID) 20 mg tablet Take 1 Tab by mouth daily. Indications: PREVENTION OF STRESS ULCER    insulin glargine (LANTUS) 100 unit/mL injection Inject 70 units subcutaneously before breakfast (7AM) and 40 units subcutaneously after dinner (7PM). Indications: type 2 diabetes mellitus    insulin lispro (HUMALOG) 100 unit/mL injection To be given 15 min before meals: < 150 - None, 151-200 - 2 u, 201-250 - 4 u, 251-300 - 6 u, 301-350 - 8 u, 351-400 - 10 u, >400 - 12 u    oxyCODONE-acetaminophen (PERCOCET) 5-325 mg per tablet Take 1 Tab by mouth every six (6) hours as needed for Pain. Max Daily Amount: 4 Tabs. Indications: Pain    aspirin 81 mg chewable tablet Take 1 Tab by mouth daily (with breakfast). Indications: prevention of cerebrovascular accident    levothyroxine (SYNTHROID) 100 mcg tablet Take 1 Tab by mouth Daily (before breakfast). Indications: hypothyroidism    tolterodine (DETROL) 1 mg tablet Take 1 Tab by mouth two (2) times a day. Indications: URINARY URGE INCONTINENCE    cholecalciferol (VITAMIN D3) 1,000 unit tablet Take 2 Tabs by mouth daily. Indications: PREVENTION OF VITAMIN D DEFICIENCY    umeclidinium-vilanterol (ANORO ELLIPTA) 62.5-25 mcg/actuation inhaler Take 1 Puff by inhalation daily.     VENTOLIN HFA 90 mcg/actuation inhaler INHALE 2 PUFFS EVERY 4 HOURS AS NEEDED    divalproex DR (DEPAKOTE) 250 mg tablet Take 250 mg by mouth nightly. Indications: BIPOLAR DISORDER IN REMISSION    insulin syringe,safetyneedle 1 mL 30 gauge x 5/16\" syrg As directed    ARIPiprazole (ABILIFY) 5 mg tablet Take 10 mg by mouth daily. Indications: BIPOLAR DISORDER IN REMISSION    albuterol (PROVENTIL VENTOLIN) 2.5 mg /3 mL (0.083 %) nebulizer solution USE 1 NEB EVERY 4 HOURS FOR WHEEZING AND SHORTNESS OF BREATH  Indications: CHRONIC OBSTRUCTIVE PULMONARY DISEASE    gabapentin (NEURONTIN) 300 mg capsule Take 300 mg by mouth three (3) times daily. Indications: NEUROPATHIC PAIN    acetaminophen (TYLENOL) 325 mg tablet Take 325 mg by mouth every six (6) hours as needed for Pain. Indications: Fever, Pain    traZODone (DESYREL) 100 mg tablet Take 100 mg by mouth nightly. Indications: major depressive disorder    rosuvastatin (CRESTOR) 5 mg tablet Take 1 Tab by mouth nightly.  l-methylfolate-vit b12-vit b6 (METANX) 2.8-2-25 mg Tab Take 1 Tab by mouth daily.  Nebulizer Accessories Kit Use with nebulizer machine    sertraline (ZOLOFT) 100 mg tablet Take 50 mg by mouth daily. Indications: DEPRESSION ASSOCIATED WITH BIPOLAR DISORDER    Oxygen-Air Delivery Systems by Nasal route continuous. 2 LPM via NC  Indications: Hypoxemia requiring supplementary oxygen     No current facility-administered medications for this visit. Visit Vitals    /70    Pulse 92    Ht 5' 5\" (1.651 m)    Wt 80.3 kg (177 lb)    LMP 11/25/2012    BMI 29.45 kg/m2       Review of Systems   Constitutional: Negative for chills and fever. HENT: Negative for nosebleeds. Eyes: Negative for blurred vision and double vision. Respiratory: Positive for shortness of breath. Negative for cough, hemoptysis, sputum production and wheezing. Cardiovascular: Positive for leg swelling. Negative for chest pain, palpitations, orthopnea, claudication and PND. Gastrointestinal: Negative for heartburn, nausea and vomiting. Musculoskeletal: Negative for myalgias. Skin: Negative for rash. Neurological: Negative for dizziness and weakness. Endo/Heme/Allergies: Does not bruise/bleed easily.      Family History   Problem Relation Age of Onset    Seizures Other     Diabetes Mother     Hypertension Mother     Heart Disease Mother     Cancer Mother     Diabetes Father     Stroke Father     Heart Disease Father     Headache Sister     Depression Neg Hx     Suicide Neg Hx     Psychotic Disorder Neg Hx     Substance Abuse Neg Hx     Dementia Neg Hx        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 congestive heart failure     Better controlled     Bipolar affective disorder ([facility] 12/5/2012    Cellulitis of right forearm 5/4/2017    Chronic back pain     Chronic obstructive pulmonary disease (COPD) (HCC)     SOB, on [place] O2 Recent admission with psychosis     CKD stage G3a/A1, GFR 45-59 and albumin creatinine ratio <30 mg/g 5/11/2017    Urine microalbumin/Creatinine ratio (5/11/2017) = 9 mg/g    Contusion of left elbow 5/4/2017    Depression     Diabetic neuropathy associated with type 2 diabetes mellitus (HCC)     Diastolic dysfunction without heart failure     Stable on diuretics     Falls frequently     Gastroesophageal reflux disease with hiatal hernia     Generalized osteoarthritis of multiple sites     History of acute renal failure 5/31/2013    History of back injury     jumped out of second story window     History of hepatitis C     treated    History of penicillin allergy     History of vitamin D deficiency 5/10/2017    Hyperuricemia 5/26/2017    Hypothyroidism     Hypoxemia requiring supplemental oxygen 12/29/2014    Intravenous drug user 2017    Memory difficulty     Mixed connective tissue disease ([facility] 5/10/2017    Obesity, Class I, BMI 30-34.9     Olecranon bursitis of right elbow 2017    Recurrent genital herpes 2013    Right Achilles tendinitis 2017    Sarcoidosis ([facility]     Sickle cell trait (HCC)     Type 2 diabetes with stage 3 chronic kidney disease GFR 30-59 ([facility]     Urge urinary incontinence 5/10/2017    Venous insufficiency     Wears glasses        Past Surgical History:   Procedure Laterality Date    HX BACK SURGERY      HX  SECTION      HX CYST REMOVAL Left     Left foot    HX OTHER SURGICAL Left 2017    S/P incision and drainage of the abscess of the left hand and the left foot (2017 - Dr. Megan Burnham. Cristina Buchanan)    HX TUBAL LIGATION         Allergies   Allergen Reactions    Moxifloxacin Rash    Pcn [Penicillins] Swelling    Sulfa (Sulfonamide Antibiotics) Swelling       Current Outpatient Prescriptions   Medication Sig    clopidogrel (PLAVIX) 75 mg tab Take  by mouth.  predniSONE (DELTASONE) 2.5 mg tablet Take  by mouth daily (with breakfast).  traMADol (ULTRAM) 50 mg tablet     famotidine (PEPCID) 20 mg tablet Take 1 Tab by mouth daily. Indications: PREVENTION OF STRESS ULCER    insulin glargine (LANTUS) 100 unit/mL injection Inject 70 units subcutaneously before breakfast (7AM) and 40 units subcutaneously after dinner (7PM). Indications: type 2 diabetes mellitus    insulin lispro (HUMALOG) 100 unit/mL injection To be given 15 min before meals: < 150 - None, 151-200 - 2 u, 201-250 - 4 u, 251-300 - 6 u, 301-350 - 8 u, 351-400 - 10 u, >400 - 12 u    oxyCODONE-acetaminophen (PERCOCET) 5-325 mg per tablet Take 1 Tab by mouth every six (6) hours as needed for Pain. Max Daily Amount: 4 Tabs. Indications: Pain    aspirin 81 mg chewable tablet Take 1 Tab by mouth daily (with breakfast).  Indications: prevention of cerebrovascular accident    levothyroxine (SYNTHROID) 100 mcg tablet Take 1 Tab by mouth Daily (before breakfast). Indications: hypothyroidism    tolterodine (DETROL) 1 mg tablet Take 1 Tab by mouth two (2) times a day. Indications: URINARY URGE INCONTINENCE    cholecalciferol (VITAMIN D3) 1,000 unit tablet Take 2 Tabs by mouth daily. Indications: PREVENTION OF VITAMIN D DEFICIENCY    umeclidinium-vilanterol (ANORO ELLIPTA) 62.5-25 mcg/actuation inhaler Take 1 Puff by inhalation daily.  VENTOLIN HFA 90 mcg/actuation inhaler INHALE 2 PUFFS EVERY 4 HOURS AS NEEDED    divalproex DR (DEPAKOTE) 250 mg tablet Take 250 mg by mouth nightly. Indications: BIPOLAR DISORDER IN REMISSION    insulin syringe,safetyneedle 1 mL 30 gauge x 5/16\" syrg As directed    ARIPiprazole (ABILIFY) 5 mg tablet Take 10 mg by mouth daily. Indications: BIPOLAR DISORDER IN REMISSION    albuterol (PROVENTIL VENTOLIN) 2.5 mg /3 mL (0.083 %) nebulizer solution USE 1 NEB EVERY 4 HOURS FOR WHEEZING AND SHORTNESS OF BREATH  Indications: CHRONIC OBSTRUCTIVE PULMONARY DISEASE    gabapentin (NEURONTIN) 300 mg capsule Take 300 mg by mouth three (3) times daily. Indications: NEUROPATHIC PAIN    acetaminophen (TYLENOL) 325 mg tablet Take 325 mg by mouth every six (6) hours as needed for Pain. Indications: Fever, Pain    traZODone (DESYREL) 100 mg tablet Take 100 mg by mouth nightly. Indications: major depressive disorder    rosuvastatin (CRESTOR) 5 mg tablet Take 1 Tab by mouth nightly.  l-methylfolate-vit b12-vit b6 (METANX) 2.8-2-25 mg Tab Take 1 Tab by mouth daily.  Nebulizer Accessories Kit Use with nebulizer machine    sertraline (ZOLOFT) 100 mg tablet Take 50 mg by mouth daily. Indications: DEPRESSION ASSOCIATED WITH BIPOLAR DISORDER    Oxygen-Air Delivery Systems by Nasal route continuous. 2 LPM via NC  Indications: Hypoxemia requiring supplementary oxygen     No current facility-administered medications for this visit.         Visit Vitals    /70    Pulse 92    Ht 5' 5\" (1.651 m)  Wt 80.3 kg (177 lb)    LMP 11/25/2012    BMI 29.45 kg/m2         Physical Exam   Constitutional: She is oriented to person, place, and time. She appears well-developed and well-nourished. HENT:   Head: Normocephalic and atraumatic. Eyes: Conjunctivae are normal.   Neck: Neck supple. No JVD present. No tracheal deviation present. No thyromegaly present. Cardiovascular: Normal rate and regular rhythm. PMI is not displaced. Exam reveals no gallop, no S3 and no decreased pulses. No murmur heard. Pulmonary/Chest: No respiratory distress. She has decreased breath sounds. She has wheezes. She has no rales. She exhibits no tenderness. Abdominal: Soft. There is no tenderness. Musculoskeletal: She exhibits no edema (mild edema). Neurological: She is alert and oriented to person, place, and time. Skin: Skin is warm. Psychiatric: She has a normal mood and affect. CARDIOLOGY STUDIES 10/1/2012 10/1/2012 12/1/2009 12/1/2009 11/1/2009 11/1/2009   EKG Result WNL WNL - - - -   Myocardial Perfusion Scan Result - - nl scan, EF 69% nl scan, EF 69% - -   Echocardiogram - Complete Result Normal EF, Enlarged rt side Normal EF, Enlarged RT side - - EF 60% EF 60%   Some recent data might be hidden     Echo:4/2016   NORMAL GLOBAL LEFT VENTRICULAR SYSTOLIC FUNCTION. EJECTION FRACTION OF 65%. MILD DIASTOLIC DYSFUNCTION. DILATED LEFT ATRIUM. ESTIMATED PULMONARY ARTERY PRESSURE IS 39 MMHG. NO HEMODYNAMICALLY SIGNIFICANT VALVULAR PATHOLOGY. OTHER FINDINGS AS NOTED BELOW. I have personally reviewed patients ekg done at other facility. 7/2016-  I Have personally reviewed recent relevant labs available and discussed with patient  lui [facility]-7/2016  I have personally reviewed patient's records available from hospital and other providers and incorporated findings in patient care. IMAGING AND PROCEDURES-10/2016  1.  Chest x-ray was done and showed top normal size of heart, stable  hyperinflation, stable chronic increased interstitial marking consistent  with interstitial lung disease. 2. Dobutamine stress test was done, reported EF of 44% with a small  reversible inferior wall defect.-medically reated  3. Echocardiogram was done and showed 55% with grade 1 diastolic dysfunction. 4. Sputum culture was done and showed a few Demetra albicans.     Assessment       ICD-10-CM [id number]    1. Diastolic CHF, chronic (HCC) I50.32 428.32      428.0     stable  edema stable with diuretics   2. Abnormal nuclear stress test R94.39 794.39     stable on medical managment   3. 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 congestive heart failure I13.10 404.10     N18.3 585.3     bp controlled   4. Pulmonary emphysema, unspecified emphysema type ([facility] J43.9 492.8     on home o2   5. Hyperlipidemia, unspecified hyperlipidemia type E78.5 272.4 HEPATIC FUNCTION PANEL      LIPID PANEL    on statin  check labs   ok to stop plavix for procedures    Medications Discontinued During This Encounter   Medication Reason    febuxostat (ULORIC) 40 mg tab tablet Not A Current Medication       Orders Placed This Encounter    HEPATIC FUNCTION PANEL     Standing Status:   Future     Standing Expiration Date:   2/22/2018    LIPID PANEL     Standing Status:   Future     Standing Expiration Date:   2/22/2018       Follow-up Disposition:  Return in about 6 months (around 2/24/2018).

## 2017-08-24 NOTE — LETTER
Stephen Sinha 1955 8/24/2017 Dear Odalys Brandt MD 
 
I had the pleasure of evaluating  Ms. Sinha in office today. Below are the relevant portions of my assessment and plan of care. ICD-10-CM ICD-9-CM 1. Diastolic CHF, chronic (HCC) I50.32 428.32   
  428.0   
 stable 
edema stable with diuretics 2. Abnormal nuclear stress test R94.39 794.39   
 stable on medical managment 3. 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 congestive heart failure I13.10 404.10 N18.3 585.3   
 bp controlled 4. Pulmonary emphysema, unspecified emphysema type ([address] J43.9 492.8   
 on home o2  
5. Hyperlipidemia, unspecified hyperlipidemia type E78.5 272.4 HEPATIC FUNCTION PANEL  
   LIPID PANEL  
 on statin 
check labs Current Outpatient Prescriptions Medication Sig Dispense Refill  clopidogrel (PLAVIX) 75 mg tab Take  by mouth.  predniSONE (DELTASONE) 2.5 mg tablet Take  by mouth daily (with breakfast).  traMADol (ULTRAM) 50 mg tablet  famotidine (PEPCID) 20 mg tablet Take 1 Tab by mouth daily. Indications: PREVENTION OF STRESS ULCER 15 Tab 0  
 insulin glargine (LANTUS) 100 unit/mL injection Inject 70 units subcutaneously before breakfast (7AM) and 40 units subcutaneously after dinner (7PM). Indications: type 2 diabetes mellitus 1 Vial 0  
 insulin lispro (HUMALOG) 100 unit/mL injection To be given 15 min before meals: < 150 - None, 151-200 - 2 u, 201-250 - 4 u, 251-300 - 6 u, 301-350 - 8 u, 351-400 - 10 u, >400 - 12 u 1 Vial 0  
 oxyCODONE-acetaminophen (PERCOCET) 5-325 mg per tablet Take 1 Tab by mouth every six (6) hours as needed for Pain. Max Daily Amount: 4 Tabs. Indications: Pain 15 Tab 0  
 aspirin 81 mg chewable tablet Take 1 Tab by mouth daily (with breakfast).  Indications: prevention of cerebrovascular accident 13 Tab 0  
 levothyroxine (SYNTHROID) 100 mcg tablet Take 1 Tab by mouth Daily (before breakfast). Indications: hypothyroidism 15 Tab 0  
 tolterodine (DETROL) 1 mg tablet Take 1 Tab by mouth two (2) times a day. Indications: URINARY URGE INCONTINENCE 30 Tab 0  cholecalciferol (VITAMIN D3) 1,000 unit tablet Take 2 Tabs by mouth daily. Indications: PREVENTION OF VITAMIN D DEFICIENCY 30 Tab 0  
 umeclidinium-vilanterol (ANORO ELLIPTA) 62.5-25 mcg/actuation inhaler Take 1 Puff by inhalation daily. 1 Inhaler 5  
 VENTOLIN HFA 90 mcg/actuation inhaler INHALE 2 PUFFS EVERY 4 HOURS AS NEEDED 1 Inhaler 4  
 divalproex DR (DEPAKOTE) 250 mg tablet Take 250 mg by mouth nightly. Indications: BIPOLAR DISORDER IN REMISSION  insulin syringe,safetyneedle 1 mL 30 gauge x 5/16\" syrg As directed 50 Each 0  
 ARIPiprazole (ABILIFY) 5 mg tablet Take 10 mg by mouth daily. Indications: BIPOLAR DISORDER IN REMISSION    
 albuterol (PROVENTIL VENTOLIN) 2.5 mg /3 mL (0.083 %) nebulizer solution USE 1 NEB EVERY 4 HOURS FOR WHEEZING AND SHORTNESS OF BREATH  Indications: CHRONIC OBSTRUCTIVE PULMONARY DISEASE 2 Package 3  
 gabapentin (NEURONTIN) 300 mg capsule Take 300 mg by mouth three (3) times daily. Indications: NEUROPATHIC PAIN    
 acetaminophen (TYLENOL) 325 mg tablet Take 325 mg by mouth every six (6) hours as needed for Pain. Indications: Fever, Pain  traZODone (DESYREL) 100 mg tablet Take 100 mg by mouth nightly. Indications: major depressive disorder  rosuvastatin (CRESTOR) 5 mg tablet Take 1 Tab by mouth nightly. 30 Tab 6  l-methylfolate-vit b12-vit b6 (METANX) 2.8-2-25 mg Tab Take 1 Tab by mouth daily.  Nebulizer Accessories Kit Use with nebulizer machine 1 Kit prn  sertraline (ZOLOFT) 100 mg tablet Take 50 mg by mouth daily. Indications: DEPRESSION ASSOCIATED WITH BIPOLAR DISORDER    
 Oxygen-Air Delivery Systems by Nasal route continuous. 2 LPM via NC  Indications: Hypoxemia requiring supplementary oxygen Orders Placed This Encounter  HEPATIC FUNCTION PANEL  
 Standing Status:   Future Standing Expiration Date:   2/22/2018  LIPID PANEL Standing Status:   Future Standing Expiration Date:   2/22/2018  clopidogrel (PLAVIX) 75 mg tab Sig: Take  by mouth.  predniSONE (DELTASONE) 2.5 mg tablet Sig: Take  by mouth daily (with breakfast). If you have questions, please do not hesitate to call me. I look forward to following Ms. Sinha along with you. Sincerely, Ana Lucio MD

## 2017-08-24 NOTE — MR AVS SNAPSHOT
Visit Information Date & Time Provider Department Dept. Phone Encounter #  
 8/24/2017 10:00 AM Dewey Cannon MD [facility] [address] [place] [address] [id number] Follow-up Instructions Return in about 6 months (around 2/24/2018). Your Appointments 9/15/2017  2:30 PM  
New Patient with Shoshana Kumar MD  
[address] [facility], [address] and [facility] 1 ([address]) Appt Note: chronic garcía knee pain/ mri in cc/ ref by Dr Naveen Purdy PT TO COME EARLY W. PHOTO ID & INS. CARD, CURRENT MEDICATION LIST & DOSAGE TO [place] [address], [address] [facility]  
[phone] [facility]  
  
    
 2/22/2018  9:30 AM  
Office Visit with Dewey Cannon MD  
[facility]) Appt Note: [address] [facility], [address] [place] [id number]  
[address] [facility], [facility] within [facility], [address] [place] [address] Upcoming Health Maintenance Date Due  
 FOOT EXAM Q1 9/12/1966 EYE EXAM RETINAL OR DILATED Q1 9/12/1966 DTaP/Tdap/Td series (1 - Tdap) 9/12/1977 FOBT Q 1 YEAR AGE 50-75 9/12/2006 BREAST CANCER SCRN MAMMOGRAM 3/20/2015 PAP AKA CERVICAL CYTOLOGY 8/27/2015 ZOSTER VACCINE AGE 60> 7/12/2016 INFLUENZA AGE 9 TO ADULT 8/1/2017 HEMOGLOBIN A1C Q6M 10/20/2017 LIPID PANEL Q1 11/6/2017 MICROALBUMIN Q1 5/11/2018 Allergies as of 8/24/2017  Review Complete On: 8/24/2017 By: Dewey Cannon MD  
  
 Severity Noted Reaction Type Reactions Moxifloxacin  09/24/2015    Rash Pcn [Penicillins]    Swelling Sulfa (Sulfonamide Antibiotics)    Swelling Current Immunizations  Reviewed on 11/29/2012 Name Date Influenza Vaccine 10/10/2015, 12/29/2014 Influenza Vaccine Split 10/30/2012 Pneumococcal Polysaccharide (PPSV-23) 7/10/2015 Not reviewed this visit You Were Diagnosed With   
  
 Codes Comments Diastolic CHF, chronic (HCC)    -  Primary ICD-10-CM: I50.32 
ICD-9-CM: 428.32, 428.0 stable 
edema stable with diuretics Abnormal nuclear stress test     ICD-10-CM: R94.39 
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 
check labs Vitals BP Pulse Height(growth percentile) Weight(growth percentile) LMP BMI  
 109/70 92 5' 5\" (1.651 m) 177 lb (80.3 kg) 11/25/2012 29.45 kg/m2 OB Status Smoking Status Postmenopausal Former Smoker Vitals History BMI and BSA Data Body Mass Index Body Surface Area  
 29.45 kg/m 2 1.92 m 2 Preferred Pharmacy Pharmacy Name [facility] Edil Schwab, [address] [phone] Your Updated Medication List  
  
   
This list is accurate as of: 8/24/17 10:20 AM.  Always use your most recent med list.  
  
  
  
  
 ABILIFY 5 mg tablet Generic drug:  ARIPiprazole Take 10 mg by mouth daily. Indications: BIPOLAR DISORDER IN REMISSION  
  
 * albuterol 2.5 mg /3 mL (0.083 %) nebulizer solution Commonly known as:  PROVENTIL VENTOLIN  
USE 1 NEB EVERY 4 HOURS FOR WHEEZING AND SHORTNESS OF BREATH  Indications: CHRONIC OBSTRUCTIVE PULMONARY DISEASE * VENTOLIN HFA 90 mcg/actuation inhaler Generic drug:  albuterol INHALE 2 PUFFS EVERY 4 HOURS AS NEEDED  
  
 aspirin 81 mg chewable tablet Take 1 Tab by mouth daily (with breakfast). Indications: prevention of cerebrovascular accident  
  
 cholecalciferol 1,000 unit tablet Commonly known as:  VITAMIN D3  
 Take 2 Tabs by mouth daily. Indications: PREVENTION OF VITAMIN D DEFICIENCY  
  
 DEPAKOTE 250 mg tablet Generic drug:  divalproex DR Take 250 mg by mouth nightly. Indications: BIPOLAR DISORDER IN REMISSION  
  
 famotidine 20 mg tablet Commonly known as:  PEPCID Take 1 Tab by mouth daily. Indications: PREVENTION OF STRESS ULCER  
  
 insulin glargine 100 unit/mL injection Commonly known as:  LANTUS Inject 70 units subcutaneously before breakfast (7AM) and 40 units subcutaneously after dinner (7PM). Indications: type 2 diabetes mellitus  
  
 insulin lispro 100 unit/mL injection Commonly known as:  HUMALOG To be given 15 min before meals: < 150 - None, 151-200 - 2 u, 201-250 - 4 u, 251-300 - 6 u, 301-350 - 8 u, 351-400 - 10 u, >400 - 12 u  
  
 insulin syringe,safetyneedle 1 mL 30 gauge x 5/16\" Syrg As directed  
  
 levothyroxine 100 mcg tablet Commonly known as:  SYNTHROID Take 1 Tab by mouth Daily (before breakfast). Indications: hypothyroidism METANX 2.8-2-25 mg Tab Generic drug:  l-methylfolate-vit b12-vit b6 Take 1 Tab by mouth daily. Nebulizer Accessories Kit Use with nebulizer machine NEURONTIN 300 mg capsule Generic drug:  gabapentin Take 300 mg by mouth three (3) times daily. Indications: NEUROPATHIC PAIN  
  
 oxyCODONE-acetaminophen 5-325 mg per tablet Commonly known as:  PERCOCET Take 1 Tab by mouth every six (6) hours as needed for Pain. Max Daily Amount: 4 Tabs. Indications: Pain Oxygen-Air Delivery Systems  
by Nasal route continuous. 2 LPM via NC  Indications: Hypoxemia requiring supplementary oxygen PLAVIX 75 mg Tab Generic drug:  clopidogrel Take  by mouth.  
  
 predniSONE 2.5 mg tablet Commonly known as:  Jorge Mcclain Take  by mouth daily (with breakfast). rosuvastatin 5 mg tablet Commonly known as:  CRESTOR Take 1 Tab by mouth nightly. tolterodine 1 mg tablet Commonly known as:  Bridget Stiles Take 1 Tab by mouth two (2) times a day. Indications: URINARY URGE INCONTINENCE  
  
 traMADol 50 mg tablet Commonly known as:  ULTRAM  
  
 traZODone 100 mg tablet Commonly known as:  Mayra Santillan Take 100 mg by mouth nightly. Indications: major depressive disorder TYLENOL 325 mg tablet Generic drug:  acetaminophen Take 325 mg by mouth every six (6) hours as needed for Pain. Indications: Fever, Pain  
  
 umeclidinium-vilanterol 62.5-25 mcg/actuation inhaler Commonly known as:  Jyoti Call Take 1 Puff by inhalation daily. ZOLOFT 100 mg tablet Generic drug:  sertraline Take 50 mg by mouth daily. Indications: DEPRESSION ASSOCIATED WITH BIPOLAR DISORDER * Notice: This list has 2 medication(s) that are the same as other medications prescribed for you. Read the directions carefully, and ask your doctor or other care provider to review them with you. Follow-up Instructions Return in about 6 months (around 2/24/2018). To-Do List   
 08/24/2017 Lab:  HEPATIC FUNCTION PANEL   
  
 08/24/2017 Lab:  LIPID PANEL Please provide this summary of care documentation to your next provider. Your primary care clinician is listed as Vicky Alonzo. If you have any questions after today's visit, please call [phone].

## 2017-09-08 RX ORDER — UMECLIDINIUM BROMIDE AND VILANTEROL TRIFENATATE 62.5; 25 UG/1; UG/1
POWDER RESPIRATORY (INHALATION)
Qty: 1 INHALER | Refills: 5 | Status: SHIPPED | OUTPATIENT
Start: 2017-09-08 | End: 2019-01-31 | Stop reason: SDUPTHER

## 2017-09-08 RX ORDER — ALBUTEROL SULFATE 90 UG/1
AEROSOL, METERED RESPIRATORY (INHALATION)
Qty: 1 INHALER | Refills: 5 | Status: SHIPPED | OUTPATIENT
Start: 2017-09-08 | End: 2017-10-25 | Stop reason: SDUPTHER

## 2017-09-12 ENCOUNTER — OFFICE VISIT (OUTPATIENT)
Dept: PULMONOLOGY | Age: 62
End: 2017-09-12

## 2017-09-12 VITALS
HEART RATE: 85 BPM | DIASTOLIC BLOOD PRESSURE: 72 MMHG | OXYGEN SATURATION: 98 % | SYSTOLIC BLOOD PRESSURE: 110 MMHG | TEMPERATURE: 98.5 F | WEIGHT: 173 LBS | BODY MASS INDEX: 28.82 KG/M2 | RESPIRATION RATE: 16 BRPM | HEIGHT: 65 IN

## 2017-09-12 DIAGNOSIS — J44.9 COPD, SEVERE (HCC): ICD-10-CM

## 2017-09-12 DIAGNOSIS — N18.31 CKD STAGE G3A/A1, GFR 45-59 AND ALBUMIN CREATININE RATIO <30 MG/G (HCC): Chronic | ICD-10-CM

## 2017-09-12 DIAGNOSIS — J44.1 COPD EXACERBATION (HCC): ICD-10-CM

## 2017-09-12 DIAGNOSIS — R09.02 HYPOXEMIA REQUIRING SUPPLEMENTAL OXYGEN: ICD-10-CM

## 2017-09-12 DIAGNOSIS — D86.9 SARCOIDOSIS: Primary | ICD-10-CM

## 2017-09-12 DIAGNOSIS — Z99.81 HYPOXEMIA REQUIRING SUPPLEMENTAL OXYGEN: ICD-10-CM

## 2017-09-12 RX ORDER — ALLOPURINOL 100 MG/1
100 TABLET ORAL DAILY
COMMUNITY
End: 2020-01-01

## 2017-09-12 RX ORDER — PREDNISONE 10 MG/1
20 TABLET ORAL
Qty: 10 TAB | Refills: 0 | Status: SHIPPED | OUTPATIENT
Start: 2017-09-12 | End: 2018-01-03 | Stop reason: ALTCHOICE

## 2017-09-12 RX ORDER — AZITHROMYCIN 250 MG/1
TABLET, FILM COATED ORAL
Qty: 6 TAB | Refills: 0 | Status: SHIPPED | OUTPATIENT
Start: 2017-09-12 | End: 2017-10-25 | Stop reason: SDUPTHER

## 2017-09-12 NOTE — PROGRESS NOTES
HISTORY OF PRESENT ILLNESS  Mike Carvajal is a 65 y.o. female. HPI Comments: History of Sarcoidosis and COPD. Admitted  recently for COPD exacerbation and Rehab. Pt still complains of increased SOB and audible wheezing, not on Prednisone at this time. Pt admits to going back to smoking 1 ppd. Uses O2 despite smoking. Pt with history of CKD but has not seen a new nephrologist since Dr. Kennedy Martinez retired. Review of Systems   Constitutional: Positive for malaise/fatigue. Negative for chills, diaphoresis and fever. Weight loss: intentional.   HENT: Positive for congestion and sinus pain. Negative for ear discharge, ear pain, hearing loss, nosebleeds, sore throat and tinnitus. Eyes: Negative for blurred vision, double vision, photophobia, pain, discharge and redness. Respiratory: Positive for cough, shortness of breath and wheezing. Negative for hemoptysis and stridor. Sputum production: whitish clear. Cardiovascular: Negative for chest pain, palpitations, claudication and PND. Orthopnea: mild. Leg swelling: chronic. Gastrointestinal: Negative for abdominal pain, blood in stool, constipation, diarrhea, heartburn, melena, nausea and vomiting. Genitourinary: Negative for dysuria, flank pain, frequency, hematuria and urgency. Musculoskeletal: Positive for back pain and joint pain. Negative for falls, myalgias and neck pain. Skin: Negative for itching and rash. Neurological: Negative for dizziness, tingling, tremors, sensory change, speech change, focal weakness, seizures, loss of consciousness, weakness and headaches. Endo/Heme/Allergies: Negative for environmental allergies and polydipsia. Does not bruise/bleed easily. Psychiatric/Behavioral: Positive for depression. Negative for hallucinations, memory loss, substance abuse and suicidal ideas. The patient is nervous/anxious. The patient does not have insomnia.       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 congestive heart failure     Better controlled     Bipolar affective disorder ([facility] [address] 12/5/2012    Cellulitis of right forearm 5/4/2017    Chronic back pain     Chronic obstructive pulmonary disease (COPD) (HCC)     SOB, on [place] O2 Recent admission with psychosis     CKD stage G3a/A1, GFR 45-59 and albumin creatinine ratio <30 mg/g 5/11/2017    Urine microalbumin/Creatinine ratio (5/11/2017) = 9 mg/g    Contusion of left elbow 5/4/2017    Depression     Diabetic neuropathy associated with type 2 diabetes mellitus (HCC)     Diastolic dysfunction without heart failure     Stable on diuretics     Falls frequently     Gastroesophageal reflux disease with hiatal hernia     Generalized osteoarthritis of multiple sites     History of acute renal failure 5/31/2013    History of back injury     jumped out of second story window     History of hepatitis C     treated    History of penicillin allergy     History of vitamin D deficiency 5/10/2017    Hyperuricemia 5/26/2017    Hypothyroidism     Hypoxemia requiring supplemental oxygen 12/29/2014    Intravenous drug user 5/2/2017    Memory difficulty     Mixed connective tissue disease ([facility] [address] 5/10/2017    Obesity, Class I, BMI 30-34.9     Olecranon bursitis of right elbow 5/4/2017    Recurrent genital herpes 5/31/2013    Right Achilles tendinitis 5/2/2017    Sarcoidosis ([facility] [address]     Sickle cell trait (HCC)     Type 2 diabetes with stage 3 chronic kidney disease GFR 30-59 (HCC)     Urge urinary incontinence 5/10/2017    Venous insufficiency     Wears glasses      Current Outpatient Prescriptions on File Prior to Visit   Medication Sig Dispense Refill    ANORO ELLIPTA 62.5-25 mcg/actuation inhaler INHALE 1 PUFF DAILY 1 Inhaler 5  VENTOLIN HFA 90 mcg/actuation inhaler INHALE 2 PUFFS EVERY 4 HOURS AS NEEDED 1 Inhaler 5    clopidogrel (PLAVIX) 75 mg tab Take  by mouth.  traMADol (ULTRAM) 50 mg tablet       famotidine (PEPCID) 20 mg tablet Take 1 Tab by mouth daily. Indications: PREVENTION OF STRESS ULCER 15 Tab 0    insulin glargine (LANTUS) 100 unit/mL injection Inject 70 units subcutaneously before breakfast (7AM) and 40 units subcutaneously after dinner (7PM). Indications: type 2 diabetes mellitus 1 Vial 0    insulin lispro (HUMALOG) 100 unit/mL injection To be given 15 min before meals: < 150 - None, 151-200 - 2 u, 201-250 - 4 u, 251-300 - 6 u, 301-350 - 8 u, 351-400 - 10 u, >400 - 12 u 1 Vial 0    aspirin 81 mg chewable tablet Take 1 Tab by mouth daily (with breakfast). Indications: prevention of cerebrovascular accident 13 Tab 0    levothyroxine (SYNTHROID) 100 mcg tablet Take 1 Tab by mouth Daily (before breakfast). Indications: hypothyroidism 15 Tab 0    tolterodine (DETROL) 1 mg tablet Take 1 Tab by mouth two (2) times a day. Indications: URINARY URGE INCONTINENCE 30 Tab 0    cholecalciferol (VITAMIN D3) 1,000 unit tablet Take 2 Tabs by mouth daily. Indications: PREVENTION OF VITAMIN D DEFICIENCY 30 Tab 0    divalproex DR (DEPAKOTE) 250 mg tablet Take 250 mg by mouth nightly. Indications: BIPOLAR DISORDER IN REMISSION      insulin syringe,safetyneedle 1 mL 30 gauge x 5/16\" syrg As directed 50 Each 0    ARIPiprazole (ABILIFY) 5 mg tablet Take 10 mg by mouth daily. Indications: BIPOLAR DISORDER IN REMISSION      albuterol (PROVENTIL VENTOLIN) 2.5 mg /3 mL (0.083 %) nebulizer solution USE 1 NEB EVERY 4 HOURS FOR WHEEZING AND SHORTNESS OF BREATH  Indications: CHRONIC OBSTRUCTIVE PULMONARY DISEASE 2 Package 3    gabapentin (NEURONTIN) 300 mg capsule Take 300 mg by mouth three (3) times daily.  Indications: NEUROPATHIC PAIN      acetaminophen (TYLENOL) 325 mg tablet Take 325 mg by mouth every six (6) hours as needed for Pain. Indications: Fever, Pain      traZODone (DESYREL) 100 mg tablet Take 100 mg by mouth nightly. Indications: major depressive disorder      rosuvastatin (CRESTOR) 5 mg tablet Take 1 Tab by mouth nightly. 30 Tab 6    l-methylfolate-vit b12-vit b6 (METANX) 2.8-2-25 mg Tab Take 1 Tab by mouth daily.  Nebulizer Accessories Kit Use with nebulizer machine 1 Kit prn    sertraline (ZOLOFT) 100 mg tablet Take 50 mg by mouth daily. Indications: DEPRESSION ASSOCIATED WITH BIPOLAR DISORDER      Oxygen-Air Delivery Systems by Nasal route continuous. 2 LPM via NC  Indications: Hypoxemia requiring supplementary oxygen      oxyCODONE-acetaminophen (PERCOCET) 5-325 mg per tablet Take 1 Tab by mouth every six (6) hours as needed for Pain. Max Daily Amount: 4 Tabs. Indications: Pain 15 Tab 0     No current facility-administered medications on file prior to visit. Allergies   Allergen Reactions    Moxifloxacin Rash    Pcn [Penicillins] Swelling    Sulfa (Sulfonamide Antibiotics) Swelling     Social History     Social History    Marital status: SINGLE     Spouse name: N/A    Number of children: N/A    Years of education: N/A     Occupational History    Not employed Not Employed     Social History Main Topics    Smoking status: Light Tobacco Smoker     Packs/day: 0.50     Years: 30.00     Types: Cigarettes     Start date: 12/2/1969     Last attempt to quit: 10/31/2016    Smokeless tobacco: Never Used      Comment: Per pt. 10 a day     Alcohol use No    Drug use: No      Comment: +Cocaine Screen 2013    Sexual activity: Not on file      Comment: 2014     Other Topics Concern    Not on file     Social History Narrative    Lives with 15year old son. Blood pressure 110/72, pulse 85, temperature 98.5 °F (36.9 °C), temperature source Oral, resp. rate 16, height 5' 5\" (1.651 m), weight 78.5 kg (173 lb), last menstrual period 11/25/2012, SpO2 98 %.     Physical Exam   Constitutional: She is oriented to person, place, and time. She appears well-developed and well-nourished. No distress. HENT:   Head: Normocephalic and atraumatic. Nose: Nose normal.   Eyes: Conjunctivae are normal. Pupils are equal, round, and reactive to light. Right eye exhibits no discharge. Left eye exhibits no discharge. No scleral icterus. Neck: No JVD present. No tracheal deviation present. No thyromegaly present. Cardiovascular: Normal rate, regular rhythm and intact distal pulses. Exam reveals no gallop. No murmur heard. Pulmonary/Chest: Effort normal and breath sounds normal. No stridor. No respiratory distress. Wheezes: right mid to upper lung fields. She has no rales. She exhibits no tenderness. Abdominal: Soft. She exhibits no mass. There is no tenderness. Musculoskeletal: She exhibits no tenderness. Edema: trace. Lymphadenopathy:     She has no cervical adenopathy. Neurological: She is alert and oriented to person, place, and time. Skin: Skin is warm and dry. No rash noted. She is not diaphoretic. No erythema. Large ecchymosis medial left forearm   Psychiatric: She has a normal mood and affect. Her behavior is normal. Judgment and thought content normal.       CXR ANDRE POWER personal review:    ASSESSMENT and PLAN  Encounter Diagnoses   Name Primary?  Sarcoidosis ([facility] [address] Yes    COPD, severe ([address]     Hypoxemia requiring supplemental oxygen     COPD exacerbation (HCC)     CKD stage G3a/A1, GFR 45-59 and albumin creatinine ratio <30 mg/g        Pt likely in exacerbation. Will  start Prednisone and Zithromax. Continue current medications including Anoro. Titrate FiO2 to saturation greater than 90%. encouraged   Strongly counseled on need for smoking cessation. Schedule PFT's  CXR today to R/o pneumonia although doubt this.

## 2017-09-12 NOTE — MR AVS SNAPSHOT
Visit Information Date & Time Provider Department Dept. Phone Encounter #  
 9/12/2017  2:15 PM Luis Morales MD [facility] Lilli Maloney [id number] Follow-up Instructions Return in about 6 months (around 3/12/2018). Follow-up and Disposition History Your Appointments 9/15/2017  2:30 PM  
New Patient with Hari Cary MD  
[address] [facility], [address] [facility] ([address]) Appt Note: chronic garcía knee pain/ mri in cc/ ref by Dr Tesfaye Guillen PT TO COME EARLY W. PHOTO ID & INS. CARD, CURRENT MEDICATION LIST & DOSAGE TO [place] [address], [address] [facility]  
[phone] [address] [place] [address]  
  
    
 2/22/2018  9:30 AM  
Office Visit with Bradley Castanon MD  
[facility]) Appt Note: [address] [facility], [address] [facility] [address] [address] [place] [address] Upcoming Health Maintenance Date Due  
 FOOT EXAM Q1 9/12/1966 EYE EXAM RETINAL OR DILATED Q1 9/12/1966 DTaP/Tdap/Td series (1 - Tdap) 9/12/1977 FOBT Q 1 YEAR AGE 50-75 9/12/2006 BREAST CANCER SCRN MAMMOGRAM 3/20/2015 PAP AKA CERVICAL CYTOLOGY 8/27/2015 ZOSTER VACCINE AGE 60> 7/12/2016 INFLUENZA AGE 9 TO ADULT 8/1/2017 HEMOGLOBIN A1C Q6M 10/20/2017 LIPID PANEL Q1 11/6/2017 MICROALBUMIN Q1 5/11/2018 Allergies as of 9/12/2017  Review Complete On: 9/12/2017 By: Luis Morales MD  
  
 Severity Noted Reaction Type Reactions Moxifloxacin  09/24/2015    Rash Pcn [Penicillins]    Swelling Sulfa (Sulfonamide Antibiotics)    Swelling Current Immunizations  Reviewed on 11/29/2012 Name Date Influenza Vaccine 10/10/2015, 12/29/2014 Influenza Vaccine Split 10/30/2012 Pneumococcal Polysaccharide (PPSV-23) 7/10/2015 Not reviewed this visit You Were Diagnosed With   
  
 Codes Comments Sarcoidosis ([address]    -  Primary ICD-10-CM: F01.5 ICD-9-CM: 135 COPD, severe ([address]     ICD-10-CM: J44.9 ICD-9-CM: 460 Hypoxemia requiring supplemental oxygen     ICD-10-CM: R09.02, Z99.81 ICD-9-CM: 799.02   
 COPD exacerbation ([address]     ICD-10-CM: J44.1 ICD-9-CM: 491.21 CKD stage G3a/A1, GFR 45-59 and albumin creatinine ratio <30 mg/g     ICD-10-CM: N18.3 ICD-9-CM: 083. 3 Vitals BP Pulse Temp Resp Height(growth percentile) Weight(growth percentile) 110/72 (BP 1 Location: Left arm, BP Patient Position: Sitting) 85 98.5 °F (36.9 °C) (Oral) 16 5' 5\" (1.651 m) 173 lb (78.5 kg) LMP SpO2 BMI OB Status Smoking Status 11/25/2012 98% 28.79 kg/m2 Postmenopausal Light Tobacco Smoker BMI and BSA Data Body Mass Index Body Surface Area 28.79 kg/m 2 1.9 m 2 Preferred Pharmacy Pharmacy Name [facility] Viviane Horner, [address] [phone] Your Updated Medication List  
  
   
This list is accurate as of: 9/12/17  3:30 PM.  Always use your most recent med list.  
  
  
  
  
 ABILIFY 5 mg tablet Generic drug:  ARIPiprazole Take 10 mg by mouth daily. Indications: BIPOLAR DISORDER IN REMISSION  
  
 * albuterol 2.5 mg /3 mL (0.083 %) nebulizer solution Commonly known as:  PROVENTIL VENTOLIN  
USE 1 NEB EVERY 4 HOURS FOR WHEEZING AND SHORTNESS OF BREATH  Indications: CHRONIC OBSTRUCTIVE PULMONARY DISEASE * VENTOLIN HFA 90 mcg/actuation inhaler Generic drug:  albuterol INHALE 2 PUFFS EVERY 4 HOURS AS NEEDED  
  
 allopurinol 100 mg tablet Commonly known as:  Geradine Colder Take  by mouth daily. ANORO ELLIPTA 62.5-25 mcg/actuation inhaler Generic drug:  umeclidinium-vilanterol INHALE 1 PUFF DAILY  
  
 aspirin 81 mg chewable tablet Take 1 Tab by mouth daily (with breakfast). Indications: prevention of cerebrovascular accident azithromycin 250 mg tablet Commonly known as:  Ciro Port Take as directed on packaging  
  
 cholecalciferol 1,000 unit tablet Commonly known as:  VITAMIN D3 Take 2 Tabs by mouth daily. Indications: PREVENTION OF VITAMIN D DEFICIENCY  
  
 DEPAKOTE 250 mg tablet Generic drug:  divalproex DR Take 250 mg by mouth nightly. Indications: BIPOLAR DISORDER IN REMISSION  
  
 famotidine 20 mg tablet Commonly known as:  PEPCID Take 1 Tab by mouth daily. Indications: PREVENTION OF STRESS ULCER  
  
 insulin glargine 100 unit/mL injection Commonly known as:  LANTUS Inject 70 units subcutaneously before breakfast (7AM) and 40 units subcutaneously after dinner (7PM). Indications: type 2 diabetes mellitus  
  
 insulin lispro 100 unit/mL injection Commonly known as:  HUMALOG To be given 15 min before meals: < 150 - None, 151-200 - 2 u, 201-250 - 4 u, 251-300 - 6 u, 301-350 - 8 u, 351-400 - 10 u, >400 - 12 u  
  
 insulin syringe,safetyneedle 1 mL 30 gauge x 5/16\" Syrg As directed  
  
 levothyroxine 100 mcg tablet Commonly known as:  SYNTHROID Take 1 Tab by mouth Daily (before breakfast). Indications: hypothyroidism METANX 2.8-2-25 mg Tab Generic drug:  l-methylfolate-vit b12-vit b6 Take 1 Tab by mouth daily. Nebulizer Accessories Kit Use with nebulizer machine NEURONTIN 300 mg capsule Generic drug:  gabapentin Take 300 mg by mouth three (3) times daily. Indications: NEUROPATHIC PAIN  
  
 oxyCODONE-acetaminophen 5-325 mg per tablet Commonly known as:  PERCOCET Take 1 Tab by mouth every six (6) hours as needed for Pain. Max Daily Amount: 4 Tabs. Indications: Pain Oxygen-Air Delivery Systems  
by Nasal route continuous. 2 LPM via NC  Indications: Hypoxemia requiring supplementary oxygen PLAVIX 75 mg Tab Generic drug:  clopidogrel Take  by mouth.  
  
 predniSONE 10 mg tablet Commonly known as:  Graydon Aileen  
 Take 2 Tabs by mouth daily (with breakfast). rosuvastatin 5 mg tablet Commonly known as:  CRESTOR Take 1 Tab by mouth nightly. tolterodine 1 mg tablet Commonly known as:  Justin Cambric Take 1 Tab by mouth two (2) times a day. Indications: URINARY URGE INCONTINENCE  
  
 traMADol 50 mg tablet Commonly known as:  ULTRAM  
  
 traZODone 100 mg tablet Commonly known as:  Renetta Hail Take 100 mg by mouth nightly. Indications: major depressive disorder TYLENOL 325 mg tablet Generic drug:  acetaminophen Take 325 mg by mouth every six (6) hours as needed for Pain. Indications: Fever, Pain ZOLOFT 100 mg tablet Generic drug:  sertraline Take 50 mg by mouth daily. Indications: DEPRESSION ASSOCIATED WITH BIPOLAR DISORDER * Notice: This list has 2 medication(s) that are the same as other medications prescribed for you. Read the directions carefully, and ask your doctor or other care provider to review them with you. Prescriptions Printed Refills  
 azithromycin (ZITHROMAX) 250 mg tablet 0 Sig: Take as directed on packaging Class: Print Prescriptions Sent to Pharmacy Refills  
 predniSONE (DELTASONE) 10 mg tablet 0 Sig: Take 2 Tabs by mouth daily (with breakfast). Class: Normal  
 Pharmacy: [facility] - [place] [facility], [address]  Ph #: [phone] Route: Oral  
  
Follow-up Instructions Return in about 6 months (around 3/12/2018). To-Do List   
 09/12/2017 Imaging:  XR CHEST PA LAT   
  
 09/13/2017 Procedures: AMB POC PFT COMPLETE W/BRONCHODILATOR   
  
 09/13/2017 Procedures: AMB POC PFT COMPLETE W/O BRONCHODILATOR   
  
 09/13/2017 Procedures:  DIFFUSING CAPACITY   
  
 09/13/2017 Procedures:  GAS DILUT/WASHOUT LUNG VOL W/WO DISTRIB VENT&VOL Introducing [facility]! Dear Rabia Mcintyre: Thank you for requesting a [employer] account.   Our records indicate that you have previously registered for a [email or web address] account but its currently inactive. Please call our [email or web address] support line at [phone]. Additional Information If you have questions, please visit the Frequently Asked Questions section of the [email or web address] website at [email or web address] Remember, [email or web address] is NOT to be used for urgent needs. For medical emergencies, dial 911. Now available from your iPhone and Android! Please provide this summary of care documentation to your next provider. Your primary care clinician is listed as Odalys Brandt. If you have any questions after today's visit, please call [phone].

## 2017-10-24 ENCOUNTER — TELEPHONE (OUTPATIENT)
Dept: PULMONOLOGY | Age: 62
End: 2017-10-24

## 2017-10-24 NOTE — TELEPHONE ENCOUNTER
PT CALLED([phone]). NEEDS LZ TO SEND Z PACK FOR HER COUGH AND WHEEZING TO [facility] [phone].

## 2017-10-24 NOTE — TELEPHONE ENCOUNTER
Pt states she feels she has an URI and needs steroids and antibiotic. Appt offered, she will call her brother to see if and what time he could get her here tomorrow.  Pt to call me back

## 2017-10-25 ENCOUNTER — OFFICE VISIT (OUTPATIENT)
Dept: PULMONOLOGY | Age: 62
End: 2017-10-25

## 2017-10-25 VITALS
TEMPERATURE: 98.2 F | DIASTOLIC BLOOD PRESSURE: 60 MMHG | HEART RATE: 94 BPM | HEIGHT: 65 IN | RESPIRATION RATE: 18 BRPM | WEIGHT: 180 LBS | BODY MASS INDEX: 29.99 KG/M2 | OXYGEN SATURATION: 97 % | SYSTOLIC BLOOD PRESSURE: 96 MMHG

## 2017-10-25 DIAGNOSIS — J44.1 COPD WITH ACUTE EXACERBATION (HCC): Primary | ICD-10-CM

## 2017-10-25 DIAGNOSIS — J96.21 ACUTE ON CHRONIC RESPIRATORY FAILURE WITH HYPOXIA (HCC): ICD-10-CM

## 2017-10-25 RX ORDER — ALBUTEROL SULFATE 90 UG/1
2 AEROSOL, METERED RESPIRATORY (INHALATION)
Qty: 1 INHALER | Refills: 3 | Status: SHIPPED | OUTPATIENT
Start: 2017-10-25 | End: 2018-07-16 | Stop reason: CLARIF

## 2017-10-25 RX ORDER — PREDNISONE 10 MG/1
TABLET ORAL
Qty: 18 TAB | Refills: 0 | Status: SHIPPED | OUTPATIENT
Start: 2017-10-25 | End: 2018-01-03 | Stop reason: ALTCHOICE

## 2017-10-25 RX ORDER — AZITHROMYCIN 250 MG/1
TABLET, FILM COATED ORAL
Qty: 6 TAB | Refills: 0 | Status: SHIPPED | OUTPATIENT
Start: 2017-10-25 | End: 2018-01-03 | Stop reason: ALTCHOICE

## 2017-10-25 NOTE — PROGRESS NOTES
HISTORY OF PRESENT ILLNESS  Abby Pleitez is a 65 y.o. female. HPI Comments:   History of Sarcoidosis and COPD. C/o cough and increased wheezing over past 2 weeks. Was on Prednisone and Z-Pack in Sept- improved. Denies fever, chills  Pt still complains of increased SOB and audible wheezing, not on Prednisone at this time. Pt admits to going back to smoking 1 ppd. Uses O2 despite smoking. Review of Systems   Constitutional: Positive for malaise/fatigue. Negative for chills, diaphoresis and fever. Weight loss: intentional.   HENT: Positive for congestion and sinus pain. Negative for ear discharge, ear pain, hearing loss, nosebleeds, sore throat and tinnitus. Eyes: Negative for blurred vision, double vision, photophobia, pain, discharge and redness. Respiratory: Positive for cough, shortness of breath and wheezing. Negative for hemoptysis and stridor. Sputum production: whitish - beige to yellow   Cardiovascular: Negative for chest pain, palpitations, claudication and PND. Orthopnea: mild. Leg swelling: chronic. Gastrointestinal: Negative for abdominal pain, blood in stool, constipation, diarrhea, heartburn, melena, nausea and vomiting. Genitourinary: Negative for dysuria, flank pain, frequency, hematuria and urgency. Musculoskeletal: Positive for back pain and joint pain. Negative for falls, myalgias and neck pain. Skin: Negative for itching and rash. Neurological: Negative for dizziness, tingling, tremors, sensory change, speech change, focal weakness, seizures, loss of consciousness, weakness and headaches. Endo/Heme/Allergies: Negative for environmental allergies and polydipsia. Does not bruise/bleed easily. Psychiatric/Behavioral: Positive for depression. Negative for hallucinations, memory loss, substance abuse and suicidal ideas. The patient is nervous/anxious. The patient does not have insomnia.       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 congestive heart failure     Better controlled     Bipolar affective disorder ([address] 12/5/2012    Cellulitis of right forearm 5/4/2017    Chronic back pain     Chronic obstructive pulmonary disease (COPD) (HCC)     SOB, on paula O2 Recent admission with psychosis     CKD stage G3a/A1, GFR 45-59 and albumin creatinine ratio <30 mg/g 5/11/2017    Urine microalbumin/Creatinine ratio (5/11/2017) = 9 mg/g    Contusion of left elbow 5/4/2017    Depression     Diabetic neuropathy associated with type 2 diabetes mellitus (HCC)     Diastolic dysfunction without heart failure     Stable on diuretics     Falls frequently     Gastroesophageal reflux disease with hiatal hernia     Generalized osteoarthritis of multiple sites     History of acute renal failure 5/31/2013    History of back injury     jumped out of second story window     History of hepatitis C     treated    History of penicillin allergy     History of vitamin D deficiency 5/10/2017    Hyperuricemia 5/26/2017    Hypothyroidism     Hypoxemia requiring supplemental oxygen 12/29/2014    Intravenous drug user 5/2/2017    Memory difficulty     Mixed connective tissue disease ([address] 5/10/2017    Obesity, Class I, BMI 30-34.9     Olecranon bursitis of right elbow 5/4/2017    Recurrent genital herpes 5/31/2013    Right Achilles tendinitis 5/2/2017    Sarcoidosis     Sickle cell trait (HCC)     Type 2 diabetes with stage 3 chronic kidney disease GFR 30-59 ([address]     Urge urinary incontinence 5/10/2017    Venous insufficiency     Wears glasses      Current Outpatient Prescriptions on File Prior to Visit   Medication Sig Dispense Refill    allopurinol (ZYLOPRIM) 100 mg tablet Take  by mouth daily.       ANORO ELLIPTA 62.5-25 mcg/actuation inhaler INHALE 1 PUFF DAILY 1 Inhaler 5    clopidogrel (PLAVIX) 75 mg tab Take  by mouth.  traMADol (ULTRAM) 50 mg tablet       famotidine (PEPCID) 20 mg tablet Take 1 Tab by mouth daily. Indications: PREVENTION OF STRESS ULCER 15 Tab 0    insulin glargine (LANTUS) 100 unit/mL injection Inject 70 units subcutaneously before breakfast (7AM) and 40 units subcutaneously after dinner (7PM). Indications: type 2 diabetes mellitus 1 Vial 0    insulin lispro (HUMALOG) 100 unit/mL injection To be given 15 min before meals: < 150 - None, 151-200 - 2 u, 201-250 - 4 u, 251-300 - 6 u, 301-350 - 8 u, 351-400 - 10 u, >400 - 12 u 1 Vial 0    aspirin 81 mg chewable tablet Take 1 Tab by mouth daily (with breakfast). Indications: prevention of cerebrovascular accident 13 Tab 0    levothyroxine (SYNTHROID) 100 mcg tablet Take 1 Tab by mouth Daily (before breakfast). Indications: hypothyroidism 15 Tab 0    tolterodine (DETROL) 1 mg tablet Take 1 Tab by mouth two (2) times a day. Indications: URINARY URGE INCONTINENCE 30 Tab 0    cholecalciferol (VITAMIN D3) 1,000 unit tablet Take 2 Tabs by mouth daily. Indications: PREVENTION OF VITAMIN D DEFICIENCY 30 Tab 0    divalproex DR (DEPAKOTE) 250 mg tablet Take 250 mg by mouth nightly. Indications: BIPOLAR DISORDER IN REMISSION      insulin syringe,safetyneedle 1 mL 30 gauge x 5/16\" syrg As directed 50 Each 0    ARIPiprazole (ABILIFY) 5 mg tablet Take 10 mg by mouth daily. Indications: BIPOLAR DISORDER IN REMISSION      albuterol (PROVENTIL VENTOLIN) 2.5 mg /3 mL (0.083 %) nebulizer solution USE 1 NEB EVERY 4 HOURS FOR WHEEZING AND SHORTNESS OF BREATH  Indications: CHRONIC OBSTRUCTIVE PULMONARY DISEASE 2 Package 3    gabapentin (NEURONTIN) 300 mg capsule Take 300 mg by mouth three (3) times daily. Indications: NEUROPATHIC PAIN      acetaminophen (TYLENOL) 325 mg tablet Take 325 mg by mouth every six (6) hours as needed for Pain.  Indications: Fever, Pain      traZODone (DESYREL) 100 mg tablet Take 100 mg by mouth nightly. Indications: major depressive disorder      rosuvastatin (CRESTOR) 5 mg tablet Take 1 Tab by mouth nightly. 30 Tab 6    Nebulizer Accessories Kit Use with nebulizer machine 1 Kit prn    sertraline (ZOLOFT) 100 mg tablet Take 50 mg by mouth daily. Indications: DEPRESSION ASSOCIATED WITH BIPOLAR DISORDER      Oxygen-Air Delivery Systems by Nasal route continuous. 2 LPM via NC  Indications: Hypoxemia requiring supplementary oxygen      predniSONE (DELTASONE) 10 mg tablet Take 2 Tabs by mouth daily (with breakfast). 10 Tab 0    oxyCODONE-acetaminophen (PERCOCET) 5-325 mg per tablet Take 1 Tab by mouth every six (6) hours as needed for Pain. Max Daily Amount: 4 Tabs. Indications: Pain 15 Tab 0    l-methylfolate-vit b12-vit b6 (METANX) 2.8-2-25 mg Tab Take 1 Tab by mouth daily. No current facility-administered medications on file prior to visit. Allergies   Allergen Reactions    Moxifloxacin Rash    Pcn [Penicillins] Swelling    Sulfa (Sulfonamide Antibiotics) Swelling     Social History     Social History    Marital status: SINGLE     Spouse name: N/A    Number of children: N/A    Years of education: N/A     Occupational History    Not employed Not Employed     Social History Main Topics    Smoking status: Light Tobacco Smoker     Packs/day: 0.50     Years: 30.00     Types: Cigarettes     Start date: 12/2/1969     Last attempt to quit: 10/31/2016    Smokeless tobacco: Never Used      Comment: Per pt. 10 a day     Alcohol use No    Drug use: No      Comment: +Cocaine Screen 2013    Sexual activity: Not on file      Comment: 2014     Other Topics Concern    Not on file     Social History Narrative    Lives with 15year old son. Blood pressure 96/60, pulse 94, temperature 98.2 °F (36.8 °C), temperature source Oral, resp.  rate 18, height 5' 5\" (1.651 m), weight 81.6 kg (180 lb), last menstrual period 11/25/2012, SpO2 97 %.    Physical Exam   Constitutional: She is oriented to person, place, and time. She appears well-developed and well-nourished. No distress. HENT:   Head: Normocephalic and atraumatic. Nose: Nose normal.   Eyes: Conjunctivae are normal. Pupils are equal, round, and reactive to light. Right eye exhibits no discharge. Left eye exhibits no discharge. No scleral icterus. Neck: No JVD present. No tracheal deviation present. No thyromegaly present. Cardiovascular: Normal rate, regular rhythm and intact distal pulses. Exam reveals no gallop. No murmur heard. Pulmonary/Chest: Effort normal and breath sounds normal. No stridor. No respiratory distress. Wheezes: bilateral mid and upper lung fields. She has no rales. She exhibits no tenderness. Abdominal: Soft. She exhibits no mass. There is no tenderness. Musculoskeletal: She exhibits no tenderness. Edema: trace. Lymphadenopathy:     She has no cervical adenopathy. Neurological: She is alert and oriented to person, place, and time. Skin: Skin is warm and dry. No rash noted. She is not diaphoretic. No erythema. Psychiatric: She has a normal mood and affect. Her behavior is normal. Judgment and thought content normal.   ASSESSMENT and PLAN  Encounter Diagnoses   Name Primary?  COPD with acute exacerbation (HCC) Yes    Acute on chronic respiratory failure with hypoxia (HCC)      Current acute exacerbation secondary to bronchitis. - Will  start Prednisone and Zithromax. Continue current medications including Anoro. Titrate FiO2 to saturation greater than 90%. encouraged   Strongly counseled on need for smoking cessation. Follow up with Dr. Demian Evans in 4 weeks.     Jerrica Sage MD

## 2017-10-25 NOTE — PATIENT INSTRUCTIONS
Stopping Smoking: Care Instructions  Your Care Instructions    Cigarette smokers crave the nicotine in cigarettes. Giving it up is much harder than simply changing a habit. Your body has to stop craving the nicotine. It is hard to quit, but you can do it. There are many tools that people use to quit smoking. You may find that combining tools works best for you. There are several steps to quitting. First you get ready to quit. Then you get support to help you. After that, you learn new skills and behaviors to become a nonsmoker. For many people, a necessary step is getting and using medicine. Your doctor will help you set up the plan that best meets your needs. You may want to attend a smoking cessation program to help you quit smoking. When you choose a program, look for one that has proven success. Ask your doctor for ideas. You will greatly increase your chances of success if you take medicine as well as get counseling or join a cessation program.  Some of the changes you feel when you first quit tobacco are uncomfortable. Your body will miss the nicotine at first, and you may feel short-tempered and grumpy. You may have trouble sleeping or concentrating. Medicine can help you deal with these symptoms. You may struggle with changing your smoking habits and rituals. The last step is the tricky one: Be prepared for the smoking urge to continue for a time. This is a lot to deal with, but keep at it. You will feel better. Follow-up care is a key part of your treatment and safety. Be sure to make and go to all appointments, and call your doctor if you are having problems. It's also a good idea to know your test results and keep a list of the medicines you take. How can you care for yourself at home? · Ask your family, friends, and coworkers for support. You have a better chance of quitting if you have help and support.   · Join a support group, such as Nicotine Anonymous, for people who are trying to quit smoking. · Consider signing up for a smoking cessation program, such as the American Lung Association's Freedom from Smoking program.  · Set a quit date. Pick your date carefully so that it is not right in the middle of a big deadline or stressful time. Once you quit, do not even take a puff. Get rid of all ashtrays and lighters after your last cigarette. Clean your house and your clothes so that they do not smell of smoke. · Learn how to be a nonsmoker. Think about ways you can avoid those things that make you reach for a cigarette. ¨ Avoid situations that put you at greatest risk for smoking. For some people, it is hard to have a drink with friends without smoking. For others, they might skip a coffee break with coworkers who smoke. ¨ Change your daily routine. Take a different route to work or eat a meal in a different place. · Cut down on stress. Calm yourself or release tension by doing an activity you enjoy, such as reading a book, taking a hot bath, or gardening. · Talk to your doctor or pharmacist about nicotine replacement therapy, which replaces the nicotine in your body. You still get nicotine but you do not use tobacco. Nicotine replacement products help you slowly reduce the amount of nicotine you need. These products come in several forms, many of them available over-the-counter:  ¨ Nicotine patches  ¨ Nicotine gum and lozenges  ¨ Nicotine inhaler  · Ask your doctor about bupropion (Wellbutrin) or varenicline (Chantix), which are prescription medicines. They do not contain nicotine. They help you by reducing withdrawal symptoms, such as stress and anxiety. · Some people find hypnosis, acupuncture, and massage helpful for ending the smoking habit. · Eat a healthy diet and get regular exercise. Having healthy habits will help your body move past its craving for nicotine. · Be prepared to keep trying. Most people are not successful the first few times they try to quit.  Do not get mad at yourself if you smoke again. Make a list of things you learned and think about when you want to try again, such as next week, next month, or next year. Where can you learn more? Go to http://rick-kathya.info/. Enter C894 in the search box to learn more about \"Stopping Smoking: Care Instructions. \"  Current as of: March 20, 2017  Content Version: 11.4  © 2865-7438 [employer]. Care instructions adapted under license by [employer] (which disclaims liability or warranty for this information). If you have questions about a medical condition or this instruction, always ask your healthcare professional. [address] any warranty or liability for your use of this information.

## 2017-10-25 NOTE — PROGRESS NOTES
Ms. Carolynn Ahumada has a reminder for a \"due or due soon\" health maintenance. I have asked that she contact her primary care provider for follow-up on this health maintenance.

## 2017-10-25 NOTE — MR AVS SNAPSHOT
Visit Information Date & Time Provider Department Dept. Phone Encounter #  
 10/25/2017  3:30 PM MD Mariana Kaplan [facility] [id number] Follow-up Instructions Return in about 4 weeks (around 11/22/2017). Your Appointments 10/27/2017  1:30 PM  
New Patient with Rimma Rico MD  
[address] [facility], [address] [facility] ([facility]) Appt Note: chronic garcía knee pain/ mri in cc/ ref by Dr Jaclyn Anaya PT TO COME EARLY W. PHOTO ID & INS. CARD, CURRENT MEDICATION LIST & DOSAGE TO THE  LOCATION; PT R/S FROM 9/15/17 [place] [address], [address] [facility]  
[phone] [address] [place] [address], [facility] [address]  
  
    
 11/28/2017 12:15 PM  
Follow Up with Alesha Her MD  
[address] ([facility]) Appt Note: FROM 10/25/17 W/CLIFFORD  
 [address] [facility], [address] [address]  
[phone]  
  
   
 [address] [place] [address], [address] [facility],[address] [place] [address]  
  
    
 2/22/2018  9:30 AM  
Office Visit with Mando Torres MD  
[facility]) Appt Note: [address] [facility], [address] [facility] [address]  
[address], [address] [address] Upcoming Health Maintenance Date Due  
 FOOT EXAM Q1 9/12/1966 EYE EXAM RETINAL OR DILATED Q1 9/12/1966 DTaP/Tdap/Td series (1 - Tdap) 9/12/1977 FOBT Q 1 YEAR AGE 50-75 9/12/2006 BREAST CANCER SCRN MAMMOGRAM 3/20/2015 PAP AKA CERVICAL CYTOLOGY 8/27/2015 ZOSTER VACCINE AGE 60> 7/12/2016 INFLUENZA AGE 9 TO ADULT 8/1/2017 HEMOGLOBIN A1C Q6M 10/20/2017 LIPID PANEL Q1 11/6/2017 MICROALBUMIN Q1 5/11/2018 Allergies as of 10/25/2017  Review Complete On: 10/25/2017 By: Ilana Cuadra MD  
  
 Severity Noted Reaction Type Reactions Moxifloxacin  09/24/2015    Rash Pcn [Penicillins]    Swelling Sulfa (Sulfonamide Antibiotics)    Swelling Current Immunizations  Reviewed on 10/25/2017 Name Date Influenza Vaccine 10/2/2017, 10/10/2015, 12/29/2014 Influenza Vaccine Split 10/30/2012 Pneumococcal Polysaccharide (PPSV-23) 7/10/2015 Reviewed by Brian Carter LPN on 69/58/7774 at  3:54 PM  
You Were Diagnosed With   
  
 Codes Comments COPD with acute exacerbation ([facility]    -  Primary ICD-10-CM: J44.1 ICD-9-CM: 491.21 Acute on chronic respiratory failure with hypoxia (HCC)     ICD-10-CM: J96.21 
ICD-9-CM: 518.84, 799.02 Vitals BP Pulse Temp Resp Height(growth percentile) Weight(growth percentile) 96/60 (BP 1 Location: Left arm, BP Patient Position: Sitting) 94 98.2 °F (36.8 °C) (Oral) 18 5' 5\" (1.651 m) 180 lb (81.6 kg) LMP SpO2 BMI OB Status Smoking Status 11/25/2012 97% 29.95 kg/m2 Postmenopausal Light Tobacco Smoker BMI and BSA Data Body Mass Index Body Surface Area  
 29.95 kg/m 2 1.93 m 2 Preferred Pharmacy Pharmacy Name [facility] [address] [phone] Your Updated Medication List  
  
   
This list is accurate as of: 10/25/17  4:09 PM.  Always use your most recent med list.  
  
  
  
  
 ABILIFY 5 mg tablet Generic drug:  ARIPiprazole Take 10 mg by mouth daily. Indications: BIPOLAR DISORDER IN REMISSION  
  
 * albuterol 2.5 mg /3 mL (0.083 %) nebulizer solution Commonly known as:  PROVENTIL VENTOLIN  
USE 1 NEB EVERY 4 HOURS FOR WHEEZING AND SHORTNESS OF BREATH  Indications: CHRONIC OBSTRUCTIVE PULMONARY DISEASE * albuterol 90 mcg/actuation inhaler Commonly known as:  PROVENTIL HFA Take 2 Puffs by inhalation every four (4) hours as needed for Wheezing. allopurinol 100 mg tablet Commonly known as:  Lisandro Rist Take  by mouth daily. ANORO ELLIPTA 62.5-25 mcg/actuation inhaler Generic drug:  umeclidinium-vilanterol INHALE 1 PUFF DAILY  
  
 aspirin 81 mg chewable tablet Take 1 Tab by mouth daily (with breakfast). Indications: prevention of cerebrovascular accident  
  
 azithromycin 250 mg tablet Commonly known as:  Elsworth Heaps Take as directed on packaging  
  
 cholecalciferol 1,000 unit tablet Commonly known as:  VITAMIN D3 Take 2 Tabs by mouth daily. Indications: PREVENTION OF VITAMIN D DEFICIENCY  
  
 DEPAKOTE 250 mg tablet Generic drug:  divalproex DR Take 250 mg by mouth nightly. Indications: BIPOLAR DISORDER IN REMISSION  
  
 famotidine 20 mg tablet Commonly known as:  PEPCID Take 1 Tab by mouth daily. Indications: PREVENTION OF STRESS ULCER  
  
 insulin glargine 100 unit/mL injection Commonly known as:  LANTUS Inject 70 units subcutaneously before breakfast (7AM) and 40 units subcutaneously after dinner (7PM). Indications: type 2 diabetes mellitus  
  
 insulin lispro 100 unit/mL injection Commonly known as:  HUMALOG To be given 15 min before meals: < 150 - None, 151-200 - 2 u, 201-250 - 4 u, 251-300 - 6 u, 301-350 - 8 u, 351-400 - 10 u, >400 - 12 u  
  
 insulin syringe,safetyneedle 1 mL 30 gauge x 5/16\" Syrg As directed  
  
 levothyroxine 100 mcg tablet Commonly known as:  SYNTHROID Take 1 Tab by mouth Daily (before breakfast). Indications: hypothyroidism METANX 2.8-2-25 mg Tab Generic drug:  l-methylfolate-vit b12-vit b6 Take 1 Tab by mouth daily. Nebulizer Accessories Kit Use with nebulizer machine NEURONTIN 300 mg capsule Generic drug:  gabapentin Take 300 mg by mouth three (3) times daily. Indications: NEUROPATHIC PAIN  
  
 oxyCODONE-acetaminophen 5-325 mg per tablet Commonly known as:  PERCOCET Take 1 Tab by mouth every six (6) hours as needed for Pain. Max Daily Amount: 4 Tabs. Indications: Pain Oxygen-Air Delivery Systems  
by Nasal route continuous.  2 LPM via NC  Indications: Hypoxemia requiring supplementary oxygen PLAVIX 75 mg Tab Generic drug:  clopidogrel Take  by mouth. * predniSONE 10 mg tablet Commonly known as:  Missy Kirkpatrick Take 2 Tabs by mouth daily (with breakfast). * predniSONE 10 mg tablet Commonly known as:  DELTASONE  
30 mg po dailyx 3 days,20 mg po daily x 3 days,10 mg po daily x 3 days  
  
 rosuvastatin 5 mg tablet Commonly known as:  CRESTOR Take 1 Tab by mouth nightly. tolterodine 1 mg tablet Commonly known as:  [place] Sandifer Take 1 Tab by mouth two (2) times a day. Indications: URINARY URGE INCONTINENCE  
  
 traMADol 50 mg tablet Commonly known as:  ULTRAM  
  
 traZODone 100 mg tablet Commonly known as:  [place] Fray Take 100 mg by mouth nightly. Indications: major depressive disorder TYLENOL 325 mg tablet Generic drug:  acetaminophen Take 325 mg by mouth every six (6) hours as needed for Pain. Indications: Fever, Pain ZOLOFT 100 mg tablet Generic drug:  sertraline Take 50 mg by mouth daily. Indications: DEPRESSION ASSOCIATED WITH BIPOLAR DISORDER * Notice: This list has 4 medication(s) that are the same as other medications prescribed for you. Read the directions carefully, and ask your doctor or other care provider to review them with you. Prescriptions Sent to Pharmacy Refills  
 predniSONE (DELTASONE) 10 mg tablet 0 Si mg po dailyx 3 days,20 mg po daily x 3 days,10 mg po daily x 3 days Class: Normal  
 Pharmacy: [facility] [place] [address]  Ph #: [phone]  
 azithromycin (ZITHROMAX) 250 mg tablet 0 Sig: Take as directed on packaging Class: Normal  
 Pharmacy: [facility], [address]  Ph #: [phone] Follow-up Instructions Return in about 4 weeks (around 2017). Patient Instructions Stopping Smoking: Care Instructions Your Care Instructions Cigarette smokers crave the nicotine in cigarettes. Giving it up is much harder than simply changing a habit. Your body has to stop craving the nicotine. It is hard to quit, but you can do it. There are many tools that people use to quit smoking. You may find that combining tools works best for you. There are several steps to quitting. First you get ready to quit. Then you get support to help you. After that, you learn new skills and behaviors to become a nonsmoker. For many people, a necessary step is getting and using medicine. Your doctor will help you set up the plan that best meets your needs. You may want to attend a smoking cessation program to help you quit smoking. When you choose a program, look for one that has proven success. Ask your doctor for ideas. You will greatly increase your chances of success if you take medicine as well as get counseling or join a cessation program. 
Some of the changes you feel when you first quit tobacco are uncomfortable. Your body will miss the nicotine at first, and you may feel short-tempered and grumpy. You may have trouble sleeping or concentrating. Medicine can help you deal with these symptoms. You may struggle with changing your smoking habits and rituals. The last step is the tricky one: Be prepared for the smoking urge to continue for a time. This is a lot to deal with, but keep at it. You will feel better. Follow-up care is a key part of your treatment and safety. Be sure to make and go to all appointments, and call your doctor if you are having problems. It's also a good idea to know your test results and keep a list of the medicines you take. How can you care for yourself at home? · Ask your family, friends, and coworkers for support. You have a better chance of quitting if you have help and support. · Join a support group, such as Nicotine Anonymous, for people who are trying to quit smoking. · Consider signing up for a smoking cessation program, such as the American Lung Association's Freedom from Smoking program. 
· Set a quit date. Pick your date carefully so that it is not right in the middle of a big deadline or stressful time. Once you quit, do not even take a puff. Get rid of all ashtrays and lighters after your last cigarette. Clean your house and your clothes so that they do not smell of smoke. · Learn how to be a nonsmoker. Think about ways you can avoid those things that make you reach for a cigarette. ¨ Avoid situations that put you at greatest risk for smoking. For some people, it is hard to have a drink with friends without smoking. For others, they might skip a coffee break with coworkers who smoke. ¨ Change your daily routine. Take a different route to work or eat a meal in a different place. · Cut down on stress. Calm yourself or release tension by doing an activity you enjoy, such as reading a book, taking a hot bath, or gardening. · Talk to your doctor or pharmacist about nicotine replacement therapy, which replaces the nicotine in your body. You still get nicotine but you do not use tobacco. Nicotine replacement products help you slowly reduce the amount of nicotine you need. These products come in several forms, many of them available over-the-counter: ¨ Nicotine patches ¨ Nicotine gum and lozenges ¨ Nicotine inhaler · Ask your doctor about bupropion (Wellbutrin) or varenicline (Chantix), which are prescription medicines. They do not contain nicotine. They help you by reducing withdrawal symptoms, such as stress and anxiety. · Some people find hypnosis, acupuncture, and massage helpful for ending the smoking habit. · Eat a healthy diet and get regular exercise. Having healthy habits will help your body move past its craving for nicotine. · Be prepared to keep trying.  Most people are not successful the first few times they try to quit. Do not get mad at yourself if you smoke again. Make a list of things you learned and think about when you want to try again, such as next week, next month, or next year. Where can you learn more? Go to http://rick-kathya.info/. Enter K026 in the search box to learn more about \"Stopping Smoking: Care Instructions. \" Current as of: March 20, 2017 Content Version: 11.4 © 8289-6077 [employer]. Care instructions adapted under license by [employer] (which disclaims liability or warranty for this information). If you have questions about a medical condition or this instruction, always ask your healthcare professional. [address] any warranty or liability for your use of this information. Introducing [facility] [place] [facility]! Dear Kierra Mims: Thank you for requesting a Now In Store account. Our records indicate that you have previously registered for a Now In Store account but its currently inactive. Please call our Now In Store support line at [phone]. Additional Information If you have questions, please visit the Frequently Asked Questions section of the Now In Store website at [email or web address] Remember, Now In Store is NOT to be used for urgent needs. For medical emergencies, dial 911. Now available from your iPhone and Android! Please provide this summary of care documentation to your next provider. Your primary care clinician is listed as Trini Paulson. If you have any questions after today's visit, please call [phone].

## 2017-10-27 ENCOUNTER — OFFICE VISIT (OUTPATIENT)
Dept: ORTHOPEDIC SURGERY | Age: 62
End: 2017-10-27

## 2017-10-27 VITALS
WEIGHT: 183.2 LBS | HEART RATE: 90 BPM | TEMPERATURE: 97.9 F | DIASTOLIC BLOOD PRESSURE: 74 MMHG | BODY MASS INDEX: 30.52 KG/M2 | HEIGHT: 65 IN | RESPIRATION RATE: 15 BRPM | SYSTOLIC BLOOD PRESSURE: 117 MMHG

## 2017-10-27 DIAGNOSIS — M17.0 PRIMARY OSTEOARTHRITIS OF BOTH KNEES: Primary | ICD-10-CM

## 2017-10-27 RX ORDER — METOLAZONE 2.5 MG/1
2.5 TABLET ORAL
COMMUNITY
End: 2019-02-21 | Stop reason: SDUPTHER

## 2017-10-27 NOTE — PROGRESS NOTES
Patient: Rosalia Nogueira                MRN: [id number]       SSN: [id number]  YOB: 1955        AGE: 65 y.o. SEX: female  Body mass index is 30.49 kg/(m^2). PCP: Esthela Schilling MD  10/27/17    HISTORY: I had the pleasure of reviewing Ms. Sinha per Dr. Elvin Neville referral.  She is a pleasant lady with a laundry list of significant health problems including emphysema, COPD, oxygen dependence, renal failure, congestive heart failure, and she remains a smoker, as well as other problems. She does have avascular necrosis involving both knees without collapse and moderately advanced arthritis. On further review of systems, she does get short of breath on exertion. PHYSICAL EXAMINATION:  On examination today, the hips rotate well. She smells of tobacco.  She is alert and oriented. She moves the head and neck adequately. There is no accessory muscle usage. Abdominal exam is nontender. The pelvis is stable. Examination of each knee reveals reasonable range of motion, slight effusions, and evidence of neuropathy distally. The calf is nontender. Derrell's sign is negative. RADIOGRAPHS:  Review of the x-rays confirms moderately advanced arthritis without collapse but with avascular necrosis. PLAN:  I would recommend nonoperative measures for her. I think she should use a walker with wheels and a seat, and she prefers her oxygen tank as a cane. Additionally, she is welcome to have injections in the knees, which may help to some degree. She really does not want this either. I will see her back in the not too distant future for followup assessment and be happy to do some injections for her. I would recommend nonoperative measures for her.            REVIEW OF SYSTEMS:      CON: negative for weight loss, fever  EYE: negative for double vision  ENT: negative for hoarseness  RS:   negative for Tb  GI:    negative for blood in stool  :  negative for blood in urine  Other systems reviewed and noted below.           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 congestive heart failure     Better controlled     Bipolar affective disorder ([facility] 12/5/2012    Cellulitis of right forearm 5/4/2017    Chronic back pain     Chronic obstructive pulmonary disease (COPD) ([facility])     SOB, on paula O2 Recent admission with psychosis     CKD stage G3a/A1, GFR 45-59 and albumin creatinine ratio <30 mg/g 5/11/2017    Urine microalbumin/Creatinine ratio (5/11/2017) = 9 mg/g    Contusion of left elbow 5/4/2017    Depression     Diabetic neuropathy associated with type 2 diabetes mellitus ([facility])     Diastolic dysfunction without heart failure     Stable on diuretics     Falls frequently     Gastroesophageal reflux disease with hiatal hernia     Generalized osteoarthritis of multiple sites     History of acute renal failure 5/31/2013    History of back injury     jumped out of second story window     History of hepatitis C     treated    History of penicillin allergy     History of vitamin D deficiency 5/10/2017    Hyperuricemia 5/26/2017    Hypothyroidism     Hypoxemia requiring supplemental oxygen 12/29/2014    Intravenous drug user 5/2/2017    Memory difficulty     Mixed connective tissue disease ([facility] 5/10/2017    Obesity, Class I, BMI 30-34.9     Olecranon bursitis of right elbow 5/4/2017    Recurrent genital herpes 5/31/2013    Right Achilles tendinitis 5/2/2017    Sarcoidosis     Sickle cell trait (HCC)     Type 2 diabetes with stage 3 chronic kidney disease GFR 30-59 ([facility])     Urge urinary incontinence 5/10/2017    Venous insufficiency     Wears glasses        Family History   Problem Relation Age of Onset    Seizures Other     Diabetes Mother     Hypertension Mother     Heart Disease Mother     Cancer Mother     Diabetes Father     Stroke Father     Heart Disease Father     Headache Sister     Depression Neg Hx     Suicide Neg Hx     Psychotic Disorder Neg Hx     Substance Abuse Neg Hx     Dementia Neg Hx        Current Outpatient Prescriptions   Medication Sig Dispense Refill    metOLazone (ZAROXOLYN) 2.5 mg tablet Take  by mouth daily.  albuterol (PROVENTIL HFA) 90 mcg/actuation inhaler Take 2 Puffs by inhalation every four (4) hours as needed for Wheezing. 1 Inhaler 3    predniSONE (DELTASONE) 10 mg tablet 30 mg po dailyx 3 days,20 mg po daily x 3 days,10 mg po daily x 3 days 18 Tab 0    azithromycin (ZITHROMAX) 250 mg tablet Take as directed on packaging 6 Tab 0    allopurinol (ZYLOPRIM) 100 mg tablet Take  by mouth daily.  ANORO ELLIPTA 62.5-25 mcg/actuation inhaler INHALE 1 PUFF DAILY 1 Inhaler 5    clopidogrel (PLAVIX) 75 mg tab Take  by mouth.  traMADol (ULTRAM) 50 mg tablet       famotidine (PEPCID) 20 mg tablet Take 1 Tab by mouth daily. Indications: PREVENTION OF STRESS ULCER 15 Tab 0    insulin glargine (LANTUS) 100 unit/mL injection Inject 70 units subcutaneously before breakfast (7AM) and 40 units subcutaneously after dinner (7PM). Indications: type 2 diabetes mellitus 1 Vial 0    insulin lispro (HUMALOG) 100 unit/mL injection To be given 15 min before meals: < 150 - None, 151-200 - 2 u, 201-250 - 4 u, 251-300 - 6 u, 301-350 - 8 u, 351-400 - 10 u, >400 - 12 u 1 Vial 0    aspirin 81 mg chewable tablet Take 1 Tab by mouth daily (with breakfast). Indications: prevention of cerebrovascular accident 13 Tab 0    levothyroxine (SYNTHROID) 100 mcg tablet Take 1 Tab by mouth Daily (before breakfast). Indications: hypothyroidism 15 Tab 0    tolterodine (DETROL) 1 mg tablet Take 1 Tab by mouth two (2) times a day.  Indications: URINARY URGE INCONTINENCE 30 Tab 0    cholecalciferol (VITAMIN D3) 1,000 unit tablet Take 2 Tabs by mouth daily. Indications: PREVENTION OF VITAMIN D DEFICIENCY 30 Tab 0    divalproex DR (DEPAKOTE) 250 mg tablet Take 250 mg by mouth nightly. Indications: BIPOLAR DISORDER IN REMISSION      insulin syringe,safetyneedle 1 mL 30 gauge x 5/16\" syrg As directed 50 Each 0    ARIPiprazole (ABILIFY) 5 mg tablet Take 10 mg by mouth daily. Indications: BIPOLAR DISORDER IN REMISSION      albuterol (PROVENTIL VENTOLIN) 2.5 mg /3 mL (0.083 %) nebulizer solution USE 1 NEB EVERY 4 HOURS FOR WHEEZING AND SHORTNESS OF BREATH  Indications: CHRONIC OBSTRUCTIVE PULMONARY DISEASE 2 Package 3    gabapentin (NEURONTIN) 300 mg capsule Take 300 mg by mouth three (3) times daily. Indications: NEUROPATHIC PAIN      acetaminophen (TYLENOL) 325 mg tablet Take 325 mg by mouth every six (6) hours as needed for Pain. Indications: Fever, Pain      traZODone (DESYREL) 100 mg tablet Take 100 mg by mouth nightly. Indications: major depressive disorder      rosuvastatin (CRESTOR) 5 mg tablet Take 1 Tab by mouth nightly. 30 Tab 6    Nebulizer Accessories Kit Use with nebulizer machine 1 Kit prn    sertraline (ZOLOFT) 100 mg tablet Take 50 mg by mouth daily. Indications: DEPRESSION ASSOCIATED WITH BIPOLAR DISORDER      Oxygen-Air Delivery Systems by Nasal route continuous. 2 LPM via NC  Indications: Hypoxemia requiring supplementary oxygen      predniSONE (DELTASONE) 10 mg tablet Take 2 Tabs by mouth daily (with breakfast). 10 Tab 0    oxyCODONE-acetaminophen (PERCOCET) 5-325 mg per tablet Take 1 Tab by mouth every six (6) hours as needed for Pain. Max Daily Amount: 4 Tabs. Indications: Pain 15 Tab 0    l-methylfolate-vit b12-vit b6 (METANX) 2.8-2-25 mg Tab Take 1 Tab by mouth daily.          Allergies   Allergen Reactions    Moxifloxacin Rash    Pcn [Penicillins] Swelling    Sulfa (Sulfonamide Antibiotics) Swelling       Past Surgical History:   Procedure Laterality Date    HX BACK SURGERY     HX  SECTION      HX CYST REMOVAL Left     Left foot    HX OTHER SURGICAL Left 2017    S/P incision and drainage of the abscess of the left hand and the left foot (2017 - Dr. David Maria. Stephen Jefferson)    HX TUBAL LIGATION         Social History     Social History    Marital status: SINGLE     Spouse name: N/A    Number of children: N/A    Years of education: N/A     Occupational History    Not employed Not Employed     Social History Main Topics    Smoking status: Light Tobacco Smoker     Packs/day: 0.50     Years: 30.00     Types: Cigarettes     Start date: 1969     Last attempt to quit: 10/31/2016    Smokeless tobacco: Never Used      Comment: Per pt. 10 a day     Alcohol use No    Drug use: No      Comment: +Cocaine Screen     Sexual activity: Not on file      Comment: 2014     Other Topics Concern    Not on file     Social History Narrative    Lives with 15year old son. Visit Vitals    /74    Pulse 90    Temp 97.9 °F (36.6 °C)    Resp 15    Ht 5' 5\" (1.651 m)    Wt 183 lb 3.2 oz (83.1 kg)    LMP 2012    BMI 30.49 kg/m2         PHYSICAL EXAMINATION:  GENERAL: Alert and oriented x3, in no acute distress, well-developed, well-nourished, afebrile. HEART: No JVD. EYES: No scleral icterus   NECK: No significant lymphadenopathy   LUNGS: No respiratory compromise or indrawing  ABDOMEN: Soft, non-tender, non-distended. Electronically signed by:  Katt Lacey MD

## 2017-10-27 NOTE — MR AVS SNAPSHOT
Visit Information Date & Time Provider Department Dept. Phone Encounter #  
 10/27/2017  1:30 PM Gilson Cristobal MD [facility] [phone] Your Appointments 11/28/2017 12:15 PM  
Follow Up with Jeniffer Blue MD  
[address] ([address]) Appt Note: FROM 10/25/17 W/CLIFFORD  
 [address], [address] [address]  
[phone]  
  
   
 [address], [address] [facility] [place] [address]  
  
    
 2/22/2018  9:30 AM  
Office Visit with Stoney Hamman, MD  
[facility] Appt Note: [address] [facility], [address] [facility] [address] [address], [address] [facility] [address] Hunt Upcoming Health Maintenance Date Due  
 FOOT EXAM Q1 9/12/1966 EYE EXAM RETINAL OR DILATED Q1 9/12/1966 DTaP/Tdap/Td series (1 - Tdap) 9/12/1977 FOBT Q 1 YEAR AGE 50-75 9/12/2006 BREAST CANCER SCRN MAMMOGRAM 3/20/2015 PAP AKA CERVICAL CYTOLOGY 8/27/2015 ZOSTER VACCINE AGE 60> 7/12/2016 HEMOGLOBIN A1C Q6M 10/20/2017 LIPID PANEL Q1 11/6/2017 MICROALBUMIN Q1 5/11/2018 Allergies as of 10/27/2017  Review Complete On: 10/27/2017 By: Gilson Cristobal MD  
  
 Severity Noted Reaction Type Reactions Moxifloxacin  09/24/2015    Rash Pcn [Penicillins]    Swelling Sulfa (Sulfonamide Antibiotics)    Swelling Current Immunizations  Reviewed on 10/25/2017 Name Date Influenza Vaccine 10/2/2017, 10/10/2015, 12/29/2014 Influenza Vaccine Split 10/30/2012 Pneumococcal Polysaccharide (PPSV-23) 7/10/2015 Not reviewed this visit You Were Diagnosed With   
  
 Codes Comments Primary osteoarthritis of both knees    -  Primary ICD-10-CM: M17.0 ICD-9-CM: 715.16 Vitals BP Pulse Temp Resp Height(growth percentile) Weight(growth percentile) 117/74 90 97.9 °F (36.6 °C) 15 5' 5\" (1.651 m) 183 lb 3.2 oz (83.1 kg) LMP BMI OB Status Smoking Status 11/25/2012 30.49 kg/m2 Postmenopausal Light Tobacco Smoker BMI and BSA Data Body Mass Index Body Surface Area  
 30.49 kg/m 2 1.95 m 2 Preferred Pharmacy Pharmacy Name [facility] Shanna Esparza, [address] [phone] Your Updated Medication List  
  
   
This list is accurate as of: 10/27/17  1:53 PM.  Always use your most recent med list.  
  
  
  
  
 ABILIFY 5 mg tablet Generic drug:  ARIPiprazole Take 10 mg by mouth daily. Indications: BIPOLAR DISORDER IN REMISSION  
  
 * albuterol 2.5 mg /3 mL (0.083 %) nebulizer solution Commonly known as:  PROVENTIL VENTOLIN  
USE 1 NEB EVERY 4 HOURS FOR WHEEZING AND SHORTNESS OF BREATH  Indications: CHRONIC OBSTRUCTIVE PULMONARY DISEASE * albuterol 90 mcg/actuation inhaler Commonly known as:  PROVENTIL HFA Take 2 Puffs by inhalation every four (4) hours as needed for Wheezing. allopurinol 100 mg tablet Commonly known as:  Chavira Bless Take  by mouth daily. ANORO ELLIPTA 62.5-25 mcg/actuation inhaler Generic drug:  umeclidinium-vilanterol INHALE 1 PUFF DAILY  
  
 aspirin 81 mg chewable tablet Take 1 Tab by mouth daily (with breakfast). Indications: prevention of cerebrovascular accident  
  
 azithromycin 250 mg tablet Commonly known as:  Charlyne [place] Take as directed on packaging  
  
 cholecalciferol 1,000 unit tablet Commonly known as:  VITAMIN D3 Take 2 Tabs by mouth daily. Indications: PREVENTION OF VITAMIN D DEFICIENCY  
  
 DEPAKOTE 250 mg tablet Generic drug:  divalproex DR Take 250 mg by mouth nightly. Indications: BIPOLAR DISORDER IN REMISSION  
  
 famotidine 20 mg tablet Commonly known as:  PEPCID Take 1 Tab by mouth daily. Indications: PREVENTION OF STRESS ULCER  
  
 insulin glargine 100 unit/mL injection Commonly known as:  LANTUS Inject 70 units subcutaneously before breakfast (7AM) and 40 units subcutaneously after dinner (7PM). Indications: type 2 diabetes mellitus  
  
 insulin lispro 100 unit/mL injection Commonly known as:  HUMALOG To be given 15 min before meals: < 150 - None, 151-200 - 2 u, 201-250 - 4 u, 251-300 - 6 u, 301-350 - 8 u, 351-400 - 10 u, >400 - 12 u  
  
 insulin syringe,safetyneedle 1 mL 30 gauge x 5/16\" Syrg As directed  
  
 levothyroxine 100 mcg tablet Commonly known as:  SYNTHROID Take 1 Tab by mouth Daily (before breakfast). Indications: hypothyroidism METANX 2.8-2-25 mg Tab Generic drug:  l-methylfolate-vit b12-vit b6 Take 1 Tab by mouth daily. metOLazone 2.5 mg tablet Commonly known as:  Latrice Vincent Take  by mouth daily. Nebulizer Accessories Kit Use with nebulizer machine NEURONTIN 300 mg capsule Generic drug:  gabapentin Take 300 mg by mouth three (3) times daily. Indications: NEUROPATHIC PAIN  
  
 oxyCODONE-acetaminophen 5-325 mg per tablet Commonly known as:  PERCOCET Take 1 Tab by mouth every six (6) hours as needed for Pain. Max Daily Amount: 4 Tabs. Indications: Pain Oxygen-Air Delivery Systems  
by Nasal route continuous. 2 LPM via NC  Indications: Hypoxemia requiring supplementary oxygen PLAVIX 75 mg Tab Generic drug:  clopidogrel Take  by mouth. * predniSONE 10 mg tablet Commonly known as:  Marika Hagan Take 2 Tabs by mouth daily (with breakfast). * predniSONE 10 mg tablet Commonly known as:  DELTASONE  
30 mg po dailyx 3 days,20 mg po daily x 3 days,10 mg po daily x 3 days  
  
 rosuvastatin 5 mg tablet Commonly known as:  CRESTOR Take 1 Tab by mouth nightly. tolterodine 1 mg tablet Commonly known as:  Kristian Knuckles Take 1 Tab by mouth two (2) times a day. Indications: URINARY URGE INCONTINENCE  
  
 traMADol 50 mg tablet Commonly known as:  ULTRAM  
  
 traZODone 100 mg tablet Commonly known as:  Rebbecca Bunde Take 100 mg by mouth nightly. Indications: major depressive disorder TYLENOL 325 mg tablet Generic drug:  acetaminophen Take 325 mg by mouth every six (6) hours as needed for Pain. Indications: Fever, Pain ZOLOFT 100 mg tablet Generic drug:  sertraline Take 50 mg by mouth daily. Indications: DEPRESSION ASSOCIATED WITH BIPOLAR DISORDER * Notice: This list has 4 medication(s) that are the same as other medications prescribed for you. Read the directions carefully, and ask your doctor or other care provider to review them with you. Introducing [facility]! Dear Pradip Cam: Thank you for requesting a [employer] account. Our records indicate that you have previously registered for a [employer] account but its currently inactive. Please call our [employer] support line at [phone]. Additional Information If you have questions, please visit the Frequently Asked Questions section of the [employer] website at [email or web address] Remember, [employer] is NOT to be used for urgent needs. For medical emergencies, dial 911. Now available from your iPhone and Android! Please provide this summary of care documentation to your next provider. Your primary care clinician is listed as Dora Bernstein. If you have any questions after today's visit, please call [phone].

## 2017-11-10 ENCOUNTER — TELEPHONE (OUTPATIENT)
Dept: PULMONOLOGY | Age: 62
End: 2017-11-10

## 2017-11-10 ENCOUNTER — OFFICE VISIT (OUTPATIENT)
Dept: PULMONOLOGY | Age: 62
End: 2017-11-10

## 2017-11-10 VITALS
RESPIRATION RATE: 20 BRPM | SYSTOLIC BLOOD PRESSURE: 110 MMHG | WEIGHT: 185 LBS | OXYGEN SATURATION: 97 % | HEART RATE: 98 BPM | DIASTOLIC BLOOD PRESSURE: 80 MMHG | HEIGHT: 65 IN | BODY MASS INDEX: 30.82 KG/M2 | TEMPERATURE: 99.5 F

## 2017-11-10 DIAGNOSIS — F41.9 ANXIETY: Chronic | ICD-10-CM

## 2017-11-10 DIAGNOSIS — J41.8 MIXED SIMPLE AND MUCOPURULENT CHRONIC BRONCHITIS (HCC): Chronic | ICD-10-CM

## 2017-11-10 DIAGNOSIS — J44.1 ASTHMA WITH COPD WITH EXACERBATION (HCC): Primary | ICD-10-CM

## 2017-11-10 DIAGNOSIS — J42 CHRONIC BRONCHITIS, UNSPECIFIED CHRONIC BRONCHITIS TYPE (HCC): Primary | Chronic | ICD-10-CM

## 2017-11-10 DIAGNOSIS — J45.901 ASTHMA WITH COPD WITH EXACERBATION (HCC): Primary | ICD-10-CM

## 2017-11-10 RX ORDER — DOXYCYCLINE 100 MG/1
100 CAPSULE ORAL 2 TIMES DAILY
Qty: 28 CAP | Refills: 0 | Status: SHIPPED | OUTPATIENT
Start: 2017-11-10 | End: 2018-01-03 | Stop reason: ALTCHOICE

## 2017-11-10 RX ORDER — PREDNISONE 20 MG/1
20 TABLET ORAL
Qty: 14 TAB | Refills: 0 | Status: SHIPPED | OUTPATIENT
Start: 2017-11-10 | End: 2018-01-03 | Stop reason: ALTCHOICE

## 2017-11-10 NOTE — PROGRESS NOTES
[facility]  Pulmonary, Critical Care, and Sleep Medicine      Pulmonary Office Progress Notes    Name: Chanelle Fields     : 1955     Date: 11/10/2017        Subjective:     Patient is a 65 y.o. female is here for sick visit. Interval history:  Pt report wheezing X 3 days, using rescue inhaler with mild relief. She report productive cough-white mucus, no hemoptysis. She report \" chest congestion,\" lower middle back pain when coughing only. She report mild SOB with exertion-on cont O2/NC @ 2 Lpm.  She report able to walk in house without SOB, climb 6 steps going in the house without SOB, \" I just take my time. \"  She denies dysphagia or choking. She report independent with ADL's. Medication compliance- inhalers-Anoro every day , Albuterol inhaler/or via nebulizer PRN, Oxygen/NC . She report finished Z Pack and Prednisone taper-with mild improvement, worst for the last 3 days. 10/25/2017 seen by Dr Lindy Harrison for COPD exacerbation likely trigger; acute bronchitis, continue smoking and old weather exposure, treated with Z Pack and Prednisone taper. Smoking hx: Currently smoke 1/2 PPD, she report quit 6 yrs ago-cold turkey, plan to do that again.   She report use Nicotine patch previously 14 mg-unable to tolerate  Travel history: no  Sick contact: no  Seasonal Allergy: cold weather    Past Medical History:   Diagnosis Date    Abnormally low high density lipoprotein (HDL) cholesterol with hypertriglyceridemia     Lipid profile (2016) showed , , HDL 38, LDL 37    Anxiety     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 congestive heart failure     Better controlled     Bipolar affective disorder ([facility] [address] 2012    Cellulitis of right forearm 2017    Chronic back pain     Chronic obstructive pulmonary disease (COPD) (HCC)     SOB, on paula O2 Recent admission with psychosis     CKD stage G3a/A1, GFR 45-59 and albumin creatinine ratio <30 mg/g 5/11/2017    Urine microalbumin/Creatinine ratio (5/11/2017) = 9 mg/g    Contusion of left elbow 5/4/2017    Depression     Diabetic neuropathy associated with type 2 diabetes mellitus (HCC)     Diastolic dysfunction without heart failure     Stable on diuretics     Falls frequently     Gastroesophageal reflux disease with hiatal hernia     Generalized osteoarthritis of multiple sites     History of acute renal failure 5/31/2013    History of back injury     jumped out of second story window     History of hepatitis C     treated    History of penicillin allergy     History of vitamin D deficiency 5/10/2017    Hyperuricemia 5/26/2017    Hypothyroidism     Hypoxemia requiring supplemental oxygen 12/29/2014    Intravenous drug user 5/2/2017    Memory difficulty     Mixed connective tissue disease ([facility] [address] 5/10/2017    Obesity, Class I, BMI 30-34.9     Olecranon bursitis of right elbow 5/4/2017    Recurrent genital herpes 5/31/2013    Right Achilles tendinitis 5/2/2017    Sarcoidosis     Sickle cell trait (HCC)     Type 2 diabetes with stage 3 chronic kidney disease GFR 30-59 ([facility] [address]     Urge urinary incontinence 5/10/2017    Venous insufficiency     Wears glasses      Social History     Social History    Marital status: SINGLE     Spouse name: N/A    Number of children: N/A    Years of education: N/A     Occupational History    Not employed Not Employed     Social History Main Topics    Smoking status: Light Tobacco Smoker     Packs/day: 0.50     Years: 30.00     Types: Cigarettes     Start date: 12/2/1969     Last attempt to quit: 10/31/2016    Smokeless tobacco: Never Used      Comment: Per pt. 10 a day     Alcohol use No    Drug use: No      Comment: +Cocaine Screen 2013    Sexual activity: Not on file      Comment: 2014     Other Topics Concern    Not on file     Social History Narrative    Lives with 15year old son.       Past Surgical History:   Procedure Laterality Date    HX BACK SURGERY      HX  SECTION      HX CYST REMOVAL Left     Left foot    HX OTHER SURGICAL Left 2017    S/P incision and drainage of the abscess of the left hand and the left foot (2017 - Dr. Rendall Curling. Amanda Barnett)    HX TUBAL LIGATION          Allergies   Allergen Reactions    Moxifloxacin Rash    Pcn [Penicillins] Swelling    Sulfa (Sulfonamide Antibiotics) Swelling       Current Outpatient Prescriptions   Medication Sig Dispense Refill    doxycycline (MONODOX) 100 mg capsule Take 1 Cap by mouth two (2) times a day. 28 Cap 0    predniSONE (DELTASONE) 20 mg tablet Take 1 Tab by mouth daily (with breakfast). Indications: wheezing 14 Tab 0    metOLazone (ZAROXOLYN) 2.5 mg tablet Take  by mouth daily.  albuterol (PROVENTIL HFA) 90 mcg/actuation inhaler Take 2 Puffs by inhalation every four (4) hours as needed for Wheezing. 1 Inhaler 3    allopurinol (ZYLOPRIM) 100 mg tablet Take  by mouth daily.  ANORO ELLIPTA 62.5-25 mcg/actuation inhaler INHALE 1 PUFF DAILY 1 Inhaler 5    clopidogrel (PLAVIX) 75 mg tab Take  by mouth.  traMADol (ULTRAM) 50 mg tablet       famotidine (PEPCID) 20 mg tablet Take 1 Tab by mouth daily. Indications: PREVENTION OF STRESS ULCER 15 Tab 0    insulin glargine (LANTUS) 100 unit/mL injection Inject 70 units subcutaneously before breakfast (7AM) and 40 units subcutaneously after dinner (7PM). Indications: type 2 diabetes mellitus 1 Vial 0    insulin lispro (HUMALOG) 100 unit/mL injection To be given 15 min before meals: < 150 - None, 151-200 - 2 u, 201-250 - 4 u, 251-300 - 6 u, 301-350 - 8 u, 351-400 - 10 u, >400 - 12 u 1 Vial 0    aspirin 81 mg chewable tablet Take 1 Tab by mouth daily (with breakfast). Indications: prevention of cerebrovascular accident 13 Tab 0    levothyroxine (SYNTHROID) 100 mcg tablet Take 1 Tab by mouth Daily (before breakfast).  Indications: hypothyroidism 15 Tab 0    tolterodine (DETROL) 1 mg tablet Take 1 Tab by mouth two (2) times a day. Indications: URINARY URGE INCONTINENCE 30 Tab 0    cholecalciferol (VITAMIN D3) 1,000 unit tablet Take 2 Tabs by mouth daily. Indications: PREVENTION OF VITAMIN D DEFICIENCY 30 Tab 0    ARIPiprazole (ABILIFY) 5 mg tablet Take 10 mg by mouth daily. Indications: BIPOLAR DISORDER IN REMISSION      albuterol (PROVENTIL VENTOLIN) 2.5 mg /3 mL (0.083 %) nebulizer solution USE 1 NEB EVERY 4 HOURS FOR WHEEZING AND SHORTNESS OF BREATH  Indications: CHRONIC OBSTRUCTIVE PULMONARY DISEASE 2 Package 3    gabapentin (NEURONTIN) 300 mg capsule Take 300 mg by mouth three (3) times daily. Indications: NEUROPATHIC PAIN      acetaminophen (TYLENOL) 325 mg tablet Take 325 mg by mouth every six (6) hours as needed for Pain. Indications: Fever, Pain      traZODone (DESYREL) 100 mg tablet Take 100 mg by mouth nightly. Indications: major depressive disorder      rosuvastatin (CRESTOR) 5 mg tablet Take 1 Tab by mouth nightly. 30 Tab 6    l-methylfolate-vit b12-vit b6 (METANX) 2.8-2-25 mg Tab Take 1 Tab by mouth daily.  sertraline (ZOLOFT) 100 mg tablet Take 50 mg by mouth daily. Indications: DEPRESSION ASSOCIATED WITH BIPOLAR DISORDER      predniSONE (DELTASONE) 10 mg tablet 30 mg po dailyx 3 days,20 mg po daily x 3 days,10 mg po daily x 3 days 18 Tab 0    azithromycin (ZITHROMAX) 250 mg tablet Take as directed on packaging 6 Tab 0    predniSONE (DELTASONE) 10 mg tablet Take 2 Tabs by mouth daily (with breakfast). 10 Tab 0    oxyCODONE-acetaminophen (PERCOCET) 5-325 mg per tablet Take 1 Tab by mouth every six (6) hours as needed for Pain. Max Daily Amount: 4 Tabs. Indications: Pain 15 Tab 0    divalproex DR (DEPAKOTE) 250 mg tablet Take 250 mg by mouth nightly.  Indications: BIPOLAR DISORDER IN REMISSION      insulin syringe,safetyneedle 1 mL 30 gauge x 5/16\" syrg As directed 50 Each 0    Nebulizer Accessories Kit Use with nebulizer machine 1 Kit prn    Oxygen-Air Delivery Systems by Nasal route continuous. 2 LPM via NC  Indications: Hypoxemia requiring supplementary oxygen       Patient Active Problem List   Diagnosis Code    Type 2 diabetes with stage 3 chronic kidney disease GFR 30-59 ([facility]) E11.22, N18.3    Diabetic neuropathy associated with type 2 diabetes mellitus ([facility] [address] E11.40    Venous insufficiency I87.2    Obesity, Class I, BMI 30-34.9 E66.9    Chronic back pain M54.9, G89.29    Generalized osteoarthritis of multiple sites M15.9    Bipolar affective disorder ([facility]) F31.9    Abnormally low high density lipoprotein (HDL) cholesterol with hypertriglyceridemia E78.6, G98.8    Diastolic dysfunction without heart failure I51.9    Chronic obstructive pulmonary disease (COPD) ([facility]) J44.9    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 congestive heart failure I13.10, N18.3    CKD stage G3a/A1, GFR 45-59 and albumin creatinine ratio <30 mg/g N18.3    Depression F32.9    History of back injury Z87.828    Anxiety F41.9    Wears glasses Z97.3    History of hepatitis C Z86.19    Sickle cell trait ([facility]) D57.3    History of acute renal failure Z87.448    Hypothyroidism E03.9    Recurrent genital herpes A60.00    Sarcoidosis D86.9    Abscess of right arm L02.413    Hypoxemia requiring supplemental oxygen R09.02, Z99.81    Abnormal nuclear stress test R94.39    Cellulitis and abscess of hand L03.119, L02.519    Cellulitis and abscess of foot L03.119, L02.619    SIRS (systemic inflammatory response syndrome) ([facility]) R65.10    Cellulitis of right forearm L03. 113    Olecranon bursitis of right elbow M70.21    Contusion of left elbow S50. 02XA    Intravenous drug user F19.90    Right Achilles tendinitis M76.61    History of penicillin allergy Z88.0    Hypokalemia E87.6    Falls frequently R29.6    Gastroesophageal reflux disease with hiatal hernia K21.9, K44.9    Memory difficulty R41.3    Mixed connective tissue disease ([facility] [address] M35.1    Impaired mobility and ADLs Z74.09    Acute renal failure superimposed on stage 3 chronic kidney disease (HCC) N17.9, N18.3    Hypomagnesemia E83.42    Hyperuricemia E79.0    History of vitamin D deficiency Z86.39    Urge urinary incontinence N39.41        Review of Systems:  Constitutional: No fever, no chills, no weight loss, no night sweats   HEENT: No epistaxis, no nasal drainage, no difficulty in swallowing, no redness in eyes  Respiratory: as above  Cardiovascular: no chest pain, no palpitations, no chronic leg edema, no syncope  Gastrointestinal: no abd pain, no vomiting, no diarrhea, no bleeding symptoms  Genitourinary: No urinary symptoms or hematuria  Integument/breast: No ulcers or rashes  Musculoskeletal:Neg  Neurological: No focal weakness, no seizures, no headaches  Behvioral/Psych: No anxiety, no depression       Objective:     Visit Vitals    /80 (BP 1 Location: Left arm, BP Patient Position: Sitting)    Pulse 98    Temp 99.5 °F (37.5 °C) (Oral)    Resp 20    Ht 5' 5\" (1.651 m)    Wt 83.9 kg (185 lb)    LMP 11/25/2012    SpO2 97%    BMI 30.79 kg/m2        Physical Exam:  General/Neurology:  Coughing with audible crackling sounds at times. NAD. Head:  Normocephalic, without obvious abnormality, atraumatic. Eye:  No scleral icterus, no pallor, no cyanosis  Nose:  No sinus tenderness, no erythema, no induration, no discharge  Throat:  Lips, mucosa, and tongue normal. Teeth  Gums normal. No tonsillar enlargement, no erythema, no exudates. No oral thrush. Neck:  Supple, symmetric. No JVD. Thyroid: no enlargement/tenderness/nodule. No lymphadenopathy. Trachea midline  Back & spine:  Symmetric, no curvature  Chest wall:  No tenderness or deformity. No rash  Lung:  BS decrease, few audible crackles. No stridors. No wheezing. No prolonged expiration. No accessory muscle use. Heart:   Regular rate & rhythm. S1 S2 present. No murmur. Abdomen/: Soft, NT, ND, +BS, no masses, no organomegaly  Extremities:  Bilateral pedal edema. No cyanosis. No clubbing  Pulses:  2+ and symmetric in DP  Lymphatic:  No cervical, supraclavicular palpable lymphadenopathy. Musculoskeletal: Chronic arthritic pain. No joint swelling or tenderness. Skin:  Color, texture, turgor normal. No rashes or lesions    Data review:       Hospital Outpatient Visit on 07/28/2017   Component Date Value Ref Range Status    [facility] SPECIMEN COL 07/28/2017 Specimens collected/sent to [facility]    Final   Admission on 05/10/2017, Discharged on 05/31/2017   No results displayed because visit has over 200 results. Imaging:  I have personally reviewed the patients radiographs and have reviewed the reports:       Results from Appointment encounter on 09/12/17   XR CHEST PA LAT   Narrative Chest    Indication: copd exacer     Comparison: May 5, 2017. Findings: Two views. No pneumothorax. No pleural effusion. Bilateral lower  lung zone atelectasis. Chronic bilateral pleural-parenchymal changes with  superimposed right lower lung zone airspace opacity Cardiomediastinal silhouette  and osseous structures grossly unchanged. Impression Impression: Right lower lung zone airspace opacity may represent early pneumonia  in the appropriate clinical setting.    -           Results from Hospital Encounter encounter on 05/01/17   CT ELBOW RT WO CONT   Narrative CT right elbow without IV contrast    HISTORY: Cellulitis. No IV contrast requested due to impaired renal function. All CT scans at this facility are performed using dose optimization technique as  appropriate to a performed exam, to include automated exposure control,  adjustment of the mA and/or kV according to patient size (including appropriate  matching for site specific examination) or use of iterative reconstruction  technique. Axial CT images were obtained.  Sagittal and coronal images were reformatted. There is posterior subcutaneous inflammation around the upper elbow to the mid  forearm. Small fluid collection or abscess not completely excluded. No large  collection identified on noncontrast study. Mild edema in the triceps with no  discrete fluid collection. No soft tissue air. No radiopaque foreign bodies. No bony erosion or fracture. No elbow joint effusion. There is irregular  sclerosis at the capitellum. Radial head is intact. Impression IMPRESSION:    Posterior subcutaneous cellulitis from the upper elbow to mid forearm. No large  fluid collection identified. No joint effusion. No bony erosion. Heterogeneous  sclerosis at the capitellum, developing osteonecrosis possible. Results for orders placed or performed during the hospital encounter of 10/31/16   EKG, 12 LEAD, INITIAL   Result Value Ref Range    Ventricular Rate 102 BPM    Atrial Rate 102 BPM    P-R Interval 116 ms    QRS Duration 76 ms    Q-T Interval 368 ms    QTC Calculation (Bezet) 479 ms    Calculated P Axis 50 degrees    Calculated R Axis 44 degrees    Calculated T Axis 43 degrees    Diagnosis       Sinus tachycardia  Possible Left atrial enlargement  Borderline ECG  When compared with ECG of 02-JUL-2016 22:40,  No significant change was found  Confirmed by Raymond Rojo ([id number]) on 11/1/2016 8:00:17 AM             Results from Hospital Encounter encounter on 05/30/13   US RETROPERITONEUM COMP   Narrative PROCEDURE:  Ultrasound Retroperitoneal    CPT code 31284    INDICATION:  Acute renal failure. .    COMPARISON:  Ultrasound right upper quadrant 11/4/11. CT 6/4/10. FINDINGS:  The right kidney measures 11.1 x 4.9 x 5.2 cm. The left kidney  measures 10.8 x 4.1 x 5.2 cm. No evidence of solid or cystic mass,  hydronephrosis or ectopic calcification is detected in either kidney. The  renal parenchymal echogenicity is unremarkable. The bladder is contracted with Renae in place.   No free intraperitoneal fluid  is seen. The visualized portions of the liver and spleen are unremarkable. The spleen  measures 11.3 x 4.7 x 11.4 cm. The inferior vena cava and aorta are not well  visualized. Impression IMPRESSION:    Unremarkable ultrasound of the kidneys. Lab Results   Component Value Date/Time    D DIMER 0.28 11/01/2016 04:13 PM          Ms. Sinha has a reminder for a \"due or due soon\" health maintenance. I have asked that she contact her primary care provider for follow-up on this health maintenance. IMPRESSION:   · Persistent COPD exacerbation likely trigger; acute bronchitis-not resolved, continue smoking (1/2 PPD and environmental (cold weather) exposure. RECOMMENDATIONS:   · Doxycyline 100 mg BID X 14 days, script send, med's actions and adverse reaction reviewed. · Prednisone 40 mg X 7 days-pt is diabetic, instructed to monitor glucose closely, s/s hyperglycemia, for glucose elevation and hyperglycemia-call PCP for management. · Instructed Albuterol by nebulization every 4 hours for 2-3 days for persistent wheezing, then as needed. If still using too often call the clinic, for severe symptoms go to hospital   · Continue Anoro as px , proper use and inhaler technique reviewed. · Continue O2/NC supplements, keep sats>90%. · Warning signs of respiratory distress were reviewed with the patient   · Discussed avoidance of precipitants/ avoid or limit going out on cold weather. · Absolute smoking cessation instructed, modest strategy encouraged  · Pulmonary toilette, encouraged deep breathing, hydrate with warm drink/soups, humidifier, well balanced meal, and activity as tolerated. · Reviewed all medications and discussed concerns, answered questions. · Follow-up 1 month or come back sooner should new symptoms or problems arise.        Renetta Hightower NP

## 2017-11-10 NOTE — PATIENT INSTRUCTIONS
Instructions:    Continue Anoro inhalation      Learning About Benefits From Quitting Smoking  How does quitting smoking make you healthier? If you're thinking about quitting smoking, you may have a few reasons to be smoke-free. Your health may be one of them. · When you quit smoking, you lower your risks for cancer, lung disease, heart attack, stroke, blood vessel disease, and blindness from macular degeneration. · When you're smoke-free, you get sick less often, and you heal faster. You are less likely to get colds, flu, bronchitis, and pneumonia. · As a nonsmoker, you may find that your mood is better and you are less stressed. When and how will you feel healthier? Quitting has real health benefits that start from day 1 of being smoke-free. And the longer you stay smoke-free, the healthier you get and the better you feel. The first hours  · After just 20 minutes, your blood pressure and heart rate go down. That means there's less stress on your heart and blood vessels. · Within 12 hours, the level of carbon monoxide in your blood drops back to normal. That makes room for more oxygen. With more oxygen in your body, you may notice that you have more energy than when you smoked. After 2 weeks  · Your lungs start to work better. · Your risk of heart attack starts to drop. After 1 month  · When your lungs are clear, you cough less and breathe deeper, so it's easier to be active. · Your sense of taste and smell return. That means you can enjoy food more than you have since you started smoking. Over the years  · After 1 year, your risk of heart disease is half what it would be if you kept smoking. · After 5 years, your risk of stroke starts to shrink. Within a few years after that, it's about the same as if you'd never smoked. · After 10 years, your risk of dying from lung cancer is cut by about half. And your risk for many other types of cancer is lower too.   How would quitting help others in your life?  When you quit smoking, you improve the health of everyone who now breathes in your smoke. · Their heart, lung, and cancer risks drop, much like yours. · They are sick less. For babies and small children, living smoke-free means they're less likely to have ear infections, pneumonia, and bronchitis. · If you're a woman who is or will be pregnant someday, quitting smoking means a healthier . · Children who are close to you are less likely to become adult smokers. Where can you learn more? Go to http://rick-kathya.info/. Enter 052 [id number] [id number] 11 in the search box to learn more about \"Learning About Benefits From Quitting Smoking. \"  Current as of: 2017  Content Version: 11.4  © 9025-6193 [employer]. Care instructions adapted under license by [employer] (which disclaims liability or warranty for this information). If you have questions about a medical condition or this instruction, always ask your healthcare professional. [place] [address] any warranty or liability for your use of this information.    and remember to exhale fully before inhaling     Continue Albuterol 2 inhalations every 4 hours as needed or by nebulizer every 4 hours as needed and if you require albuterol too often to control respiratory symptoms call the office for severe symptoms go to the emergency room    Always call for symptoms such as increasing shortness of breath or a cough productive of discolored mucus

## 2017-11-10 NOTE — TELEPHONE ENCOUNTER
Pt tx with prednisone and zitromycin x 6 days 10/25/17. Pt c/o cough with lung pain today. Appt offered today but pt states she doesn't have a ride and wants one for Monday. Appt given for cough for Monday. Advised pt if sxs worsen to go to urgent care or er over the weekend.  Pt states she understands

## 2017-11-10 NOTE — MR AVS SNAPSHOT
Visit Information Date & Time Provider Department Dept. Phone Encounter #  
 11/10/2017 12:30 PM Ronit Stephenson MD [facility] [place] [id number] Your Appointments 11/28/2017 12:15 PM  
Follow Up with Jeniffer Blue MD  
[address] ([address]) Appt Note: FROM 10/25/17 W/CLIFFORD  
 [address] [facility], [address] [address]  
[phone]  
  
   
 [address] [place] [address], [address] [place] [address]  
  
    
 2/22/2018  9:30 AM  
Office Visit with Stoney Hamman, MD  
[facility]) Appt Note: [address] [facility], [address] [facility] [address]  
[address], [address] [address] Upcoming Health Maintenance Date Due  
 FOOT EXAM Q1 9/12/1966 EYE EXAM RETINAL OR DILATED Q1 9/12/1966 DTaP/Tdap/Td series (1 - Tdap) 9/12/1977 FOBT Q 1 YEAR AGE 50-75 9/12/2006 BREAST CANCER SCRN MAMMOGRAM 3/20/2015 PAP AKA CERVICAL CYTOLOGY 8/27/2015 ZOSTER VACCINE AGE 60> 7/12/2016 HEMOGLOBIN A1C Q6M 10/20/2017 LIPID PANEL Q1 11/6/2017 MICROALBUMIN Q1 5/11/2018 Allergies as of 11/10/2017  Review Complete On: 11/10/2017 By: Jo Braun NP Severity Noted Reaction Type Reactions Moxifloxacin  09/24/2015    Rash Pcn [Penicillins]    Swelling Sulfa (Sulfonamide Antibiotics)    Swelling Current Immunizations  Reviewed on 10/25/2017 Name Date Influenza Vaccine 10/2/2017, 10/10/2015, 12/29/2014 Influenza Vaccine Split 10/30/2012 Pneumococcal Polysaccharide (PPSV-23) 7/10/2015 Not reviewed this visit Vitals LMP OB Status Smoking Status 11/25/2012 Postmenopausal Light Tobacco Smoker Your Updated Medication List  
  
   
This list is accurate as of: 11/10/17  1:34 PM.  Always use your most recent med list.  
  
  
  
  
 ABILIFY 5 mg tablet Generic drug:  ARIPiprazole Take 10 mg by mouth daily. Indications: BIPOLAR DISORDER IN REMISSION  
  
 * albuterol 2.5 mg /3 mL (0.083 %) nebulizer solution Commonly known as:  PROVENTIL VENTOLIN  
USE 1 NEB EVERY 4 HOURS FOR WHEEZING AND SHORTNESS OF BREATH  Indications: CHRONIC OBSTRUCTIVE PULMONARY DISEASE * albuterol 90 mcg/actuation inhaler Commonly known as:  PROVENTIL HFA Take 2 Puffs by inhalation every four (4) hours as needed for Wheezing. allopurinol 100 mg tablet Commonly known as:  Almetta Hefty Take  by mouth daily. ANORO ELLIPTA 62.5-25 mcg/actuation inhaler Generic drug:  umeclidinium-vilanterol INHALE 1 PUFF DAILY  
  
 aspirin 81 mg chewable tablet Take 1 Tab by mouth daily (with breakfast). Indications: prevention of cerebrovascular accident  
  
 azithromycin 250 mg tablet Commonly known as:  Elsworth Mario Take as directed on packaging  
  
 cholecalciferol 1,000 unit tablet Commonly known as:  VITAMIN D3 Take 2 Tabs by mouth daily. Indications: PREVENTION OF VITAMIN D DEFICIENCY  
  
 DEPAKOTE 250 mg tablet Generic drug:  divalproex DR Take 250 mg by mouth nightly. Indications: BIPOLAR DISORDER IN REMISSION  
  
 doxycycline 100 mg capsule Commonly known as:  Devorah Skates Take 1 Cap by mouth two (2) times a day. famotidine 20 mg tablet Commonly known as:  PEPCID Take 1 Tab by mouth daily. Indications: PREVENTION OF STRESS ULCER  
  
 insulin glargine 100 unit/mL injection Commonly known as:  LANTUS Inject 70 units subcutaneously before breakfast (7AM) and 40 units subcutaneously after dinner (7PM). Indications: type 2 diabetes mellitus  
  
 insulin lispro 100 unit/mL injection Commonly known as:  HUMALOG To be given 15 min before meals: < 150 - None, 151-200 - 2 u, 201-250 - 4 u, 251-300 - 6 u, 301-350 - 8 u, 351-400 - 10 u, >400 - 12 u  
  
 insulin syringe,safetyneedle 1 mL 30 gauge x 5/16\" Syrg As directed levothyroxine 100 mcg tablet Commonly known as:  SYNTHROID Take 1 Tab by mouth Daily (before breakfast). Indications: hypothyroidism METANX 2.8-2-25 mg Tab Generic drug:  l-methylfolate-vit b12-vit b6 Take 1 Tab by mouth daily. metOLazone 2.5 mg tablet Commonly known as:  Truett Keturah Take  by mouth daily. Nebulizer Accessories Kit Use with nebulizer machine NEURONTIN 300 mg capsule Generic drug:  gabapentin Take 300 mg by mouth three (3) times daily. Indications: NEUROPATHIC PAIN  
  
 oxyCODONE-acetaminophen 5-325 mg per tablet Commonly known as:  PERCOCET Take 1 Tab by mouth every six (6) hours as needed for Pain. Max Daily Amount: 4 Tabs. Indications: Pain Oxygen-Air Delivery Systems  
by Nasal route continuous. 2 LPM via NC  Indications: Hypoxemia requiring supplementary oxygen PLAVIX 75 mg Tab Generic drug:  clopidogrel Take  by mouth. * predniSONE 10 mg tablet Commonly known as:  Chanel Torres Take 2 Tabs by mouth daily (with breakfast). * predniSONE 10 mg tablet Commonly known as:  DELTASONE  
30 mg po dailyx 3 days,20 mg po daily x 3 days,10 mg po daily x 3 days * predniSONE 20 mg tablet Commonly known as:  Chanel Torres Take 1 Tab by mouth daily (with breakfast). Indications: wheezing  
  
 rosuvastatin 5 mg tablet Commonly known as:  CRESTOR Take 1 Tab by mouth nightly. tolterodine 1 mg tablet Commonly known as:  Orlando Jefferson Take 1 Tab by mouth two (2) times a day. Indications: URINARY URGE INCONTINENCE  
  
 traMADol 50 mg tablet Commonly known as:  ULTRAM  
  
 traZODone 100 mg tablet Commonly known as:  Malik Stacy Take 100 mg by mouth nightly. Indications: major depressive disorder TYLENOL 325 mg tablet Generic drug:  acetaminophen Take 325 mg by mouth every six (6) hours as needed for Pain. Indications: Fever, Pain ZOLOFT 100 mg tablet Generic drug:  sertraline Take 50 mg by mouth daily. Indications: DEPRESSION ASSOCIATED WITH BIPOLAR DISORDER * Notice: This list has 5 medication(s) that are the same as other medications prescribed for you. Read the directions carefully, and ask your doctor or other care provider to review them with you. Introducing [facility]! Dear John Lynne: Thank you for requesting a [employer] account. Our records indicate that you have previously registered for a [employer] account but its currently inactive. Please call our [employer] support line at [phone]. Additional Information If you have questions, please visit the Frequently Asked Questions section of the [employer] website at [email or web address] Remember, [employer] is NOT to be used for urgent needs. For medical emergencies, dial 911. Now available from your iPhone and Android! Please provide this summary of care documentation to your next provider. Your primary care clinician is listed as [place] Ren. If you have any questions after today's visit, please call [phone].

## 2017-11-13 NOTE — PROGRESS NOTES
[facility]  Pulmonary, Critical Care, and Sleep Medicine      Pulmonary Office Progress Notes    Name: Ilda Tipton     : 1955     Date: 2017        Subjective:     Patient is a 65 y.o. female is here for sick visit. Interval history:  Pt report wheezing X 3 days, using rescue inhaler with mild relief. She report productive cough-white mucus, no hemoptysis. She report \" chest congestion,\" lower middle back pain when coughing only. She report mild SOB with exertion-on cont O2/NC @ 2 Lpm.  She report able to walk in house without SOB, climb 6 steps going in the house without SOB, \" I just take my time. \"  She denies dysphagia or choking. She report independent with ADL's. Medication compliance- inhalers-Anoro every day , Albuterol inhaler/or via nebulizer PRN, Oxygen/NC . She report finished Z Pack and Prednisone taper-with mild improvement, worst for the last 3 days. 10/25/2017 seen by Dr Pema Hernandez for COPD exacerbation likely trigger; acute bronchitis, continue smoking and old weather exposure, treated with Z Pack and Prednisone taper. Smoking hx: Currently smoke 1/2 PPD, she report quit 6 yrs ago-cold turkey, plan to do that again.   She report use Nicotine patch previously 14 mg-unable to tolerate  Travel history: no  Sick contact: no  Seasonal Allergy: cold weather    Past Medical History:   Diagnosis Date    Abnormally low high density lipoprotein (HDL) cholesterol with hypertriglyceridemia     Lipid profile (2016) showed , , HDL 38, LDL 37    Anxiety     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 congestive heart failure     Better controlled     Bipolar affective disorder ([facility] [address] 2012    Cellulitis of right forearm 2017    Chronic back pain     Chronic obstructive pulmonary disease (COPD) (HCC)     SOB, on paula O2 Recent admission with psychosis     CKD stage G3a/A1, GFR 45-59 and albumin creatinine ratio <30 mg/g 5/11/2017    Urine microalbumin/Creatinine ratio (5/11/2017) = 9 mg/g    Contusion of left elbow 5/4/2017    Depression     Diabetic neuropathy associated with type 2 diabetes mellitus (HCC)     Diastolic dysfunction without heart failure     Stable on diuretics     Falls frequently     Gastroesophageal reflux disease with hiatal hernia     Generalized osteoarthritis of multiple sites     History of acute renal failure 5/31/2013    History of back injury     jumped out of second story window     History of hepatitis C     treated    History of penicillin allergy     History of vitamin D deficiency 5/10/2017    Hyperuricemia 5/26/2017    Hypothyroidism     Hypoxemia requiring supplemental oxygen 12/29/2014    Intravenous drug user 5/2/2017    Memory difficulty     Mixed connective tissue disease ([facility] [address] 5/10/2017    Obesity, Class I, BMI 30-34.9     Olecranon bursitis of right elbow 5/4/2017    Recurrent genital herpes 5/31/2013    Right Achilles tendinitis 5/2/2017    Sarcoidosis     Sickle cell trait (HCC)     Type 2 diabetes with stage 3 chronic kidney disease GFR 30-59 ([facility] [address]     Urge urinary incontinence 5/10/2017    Venous insufficiency     Wears glasses      Social History     Social History    Marital status: SINGLE     Spouse name: N/A    Number of children: N/A    Years of education: N/A     Occupational History    Not employed Not Employed     Social History Main Topics    Smoking status: Light Tobacco Smoker     Packs/day: 0.50     Years: 30.00     Types: Cigarettes     Start date: 12/2/1969     Last attempt to quit: 10/31/2016    Smokeless tobacco: Never Used      Comment: Per pt. 10 a day     Alcohol use No    Drug use: No      Comment: +Cocaine Screen 2013    Sexual activity: Not on file      Comment: 2014     Other Topics Concern    Not on file     Social History Narrative    Lives with 15year old son.       Past Surgical History:   Procedure Laterality Date    HX BACK SURGERY      HX  SECTION      HX CYST REMOVAL Left     Left foot    HX OTHER SURGICAL Left 2017    S/P incision and drainage of the abscess of the left hand and the left foot (2017 - Dr. Finn Turner. Ashley Garcia)    HX TUBAL LIGATION          Allergies   Allergen Reactions    Moxifloxacin Rash    Pcn [Penicillins] Swelling    Sulfa (Sulfonamide Antibiotics) Swelling       Current Outpatient Prescriptions   Medication Sig Dispense Refill    doxycycline (MONODOX) 100 mg capsule Take 1 Cap by mouth two (2) times a day. 28 Cap 0    predniSONE (DELTASONE) 20 mg tablet Take 1 Tab by mouth daily (with breakfast). Indications: wheezing 14 Tab 0    metOLazone (ZAROXOLYN) 2.5 mg tablet Take  by mouth daily.  albuterol (PROVENTIL HFA) 90 mcg/actuation inhaler Take 2 Puffs by inhalation every four (4) hours as needed for Wheezing. 1 Inhaler 3    predniSONE (DELTASONE) 10 mg tablet 30 mg po dailyx 3 days,20 mg po daily x 3 days,10 mg po daily x 3 days 18 Tab 0    azithromycin (ZITHROMAX) 250 mg tablet Take as directed on packaging 6 Tab 0    allopurinol (ZYLOPRIM) 100 mg tablet Take  by mouth daily.  predniSONE (DELTASONE) 10 mg tablet Take 2 Tabs by mouth daily (with breakfast). 10 Tab 0    ANORO ELLIPTA 62.5-25 mcg/actuation inhaler INHALE 1 PUFF DAILY 1 Inhaler 5    clopidogrel (PLAVIX) 75 mg tab Take  by mouth.  traMADol (ULTRAM) 50 mg tablet       famotidine (PEPCID) 20 mg tablet Take 1 Tab by mouth daily. Indications: PREVENTION OF STRESS ULCER 15 Tab 0    insulin glargine (LANTUS) 100 unit/mL injection Inject 70 units subcutaneously before breakfast (7AM) and 40 units subcutaneously after dinner (7PM).   Indications: type 2 diabetes mellitus 1 Vial 0    insulin lispro (HUMALOG) 100 unit/mL injection To be given 15 min before meals: < 150 - None, 151-200 - 2 u, 201-250 - 4 u, 251-300 - 6 u, 301-350 - 8 u, 351-400 - 10 u, >400 - 12 u 1 Vial 0    oxyCODONE-acetaminophen (PERCOCET) 5-325 mg per tablet Take 1 Tab by mouth every six (6) hours as needed for Pain. Max Daily Amount: 4 Tabs. Indications: Pain 15 Tab 0    aspirin 81 mg chewable tablet Take 1 Tab by mouth daily (with breakfast). Indications: prevention of cerebrovascular accident 13 Tab 0    levothyroxine (SYNTHROID) 100 mcg tablet Take 1 Tab by mouth Daily (before breakfast). Indications: hypothyroidism 15 Tab 0    tolterodine (DETROL) 1 mg tablet Take 1 Tab by mouth two (2) times a day. Indications: URINARY URGE INCONTINENCE 30 Tab 0    cholecalciferol (VITAMIN D3) 1,000 unit tablet Take 2 Tabs by mouth daily. Indications: PREVENTION OF VITAMIN D DEFICIENCY 30 Tab 0    divalproex DR (DEPAKOTE) 250 mg tablet Take 250 mg by mouth nightly. Indications: BIPOLAR DISORDER IN REMISSION      insulin syringe,safetyneedle 1 mL 30 gauge x 5/16\" syrg As directed 50 Each 0    ARIPiprazole (ABILIFY) 5 mg tablet Take 10 mg by mouth daily. Indications: BIPOLAR DISORDER IN REMISSION      albuterol (PROVENTIL VENTOLIN) 2.5 mg /3 mL (0.083 %) nebulizer solution USE 1 NEB EVERY 4 HOURS FOR WHEEZING AND SHORTNESS OF BREATH  Indications: CHRONIC OBSTRUCTIVE PULMONARY DISEASE 2 Package 3    gabapentin (NEURONTIN) 300 mg capsule Take 300 mg by mouth three (3) times daily. Indications: NEUROPATHIC PAIN      acetaminophen (TYLENOL) 325 mg tablet Take 325 mg by mouth every six (6) hours as needed for Pain. Indications: Fever, Pain      traZODone (DESYREL) 100 mg tablet Take 100 mg by mouth nightly. Indications: major depressive disorder      rosuvastatin (CRESTOR) 5 mg tablet Take 1 Tab by mouth nightly. 30 Tab 6    l-methylfolate-vit b12-vit b6 (METANX) 2.8-2-25 mg Tab Take 1 Tab by mouth daily.  Nebulizer Accessories Kit Use with nebulizer machine 1 Kit prn    sertraline (ZOLOFT) 100 mg tablet Take 50 mg by mouth daily.  Indications: DEPRESSION ASSOCIATED WITH BIPOLAR DISORDER  Oxygen-Air Delivery Systems by Nasal route continuous. 2 LPM via NC  Indications: Hypoxemia requiring supplementary oxygen       Patient Active Problem List   Diagnosis Code    Type 2 diabetes with stage 3 chronic kidney disease GFR 30-59 ([facility]) E11.22, N18.3    Diabetic neuropathy associated with type 2 diabetes mellitus ([facility] [address] E11.40    Venous insufficiency I87.2    Obesity, Class I, BMI 30-34.9 E66.9    Chronic back pain M54.9, G89.29    Generalized osteoarthritis of multiple sites M15.9    Bipolar affective disorder ([facility]) F31.9    Abnormally low high density lipoprotein (HDL) cholesterol with hypertriglyceridemia E78.6, S05.2    Diastolic dysfunction without heart failure I51.9    Chronic obstructive pulmonary disease (COPD) ([facility]) J44.9    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 congestive heart failure I13.10, N18.3    CKD stage G3a/A1, GFR 45-59 and albumin creatinine ratio <30 mg/g N18.3    Depression F32.9    History of back injury Z87.828    Anxiety F41.9    Wears glasses Z97.3    History of hepatitis C Z86.19    Sickle cell trait ([facility]) D57.3    History of acute renal failure Z87.448    Hypothyroidism E03.9    Recurrent genital herpes A60.00    Sarcoidosis D86.9    Abscess of right arm L02.413    Hypoxemia requiring supplemental oxygen R09.02, Z99.81    Abnormal nuclear stress test R94.39    Cellulitis and abscess of hand L03.119, L02.519    Cellulitis and abscess of foot L03.119, L02.619    SIRS (systemic inflammatory response syndrome) ([facility]) R65.10    Cellulitis of right forearm L03. 113    Olecranon bursitis of right elbow M70.21    Contusion of left elbow S50. 02XA    Intravenous drug user F19.90    Right Achilles tendinitis M76.61    History of penicillin allergy Z88.0    Hypokalemia E87.6    Falls frequently R29.6    Gastroesophageal reflux disease with hiatal hernia K21.9, K44.9    Memory difficulty R41.3    Mixed connective tissue disease (HCC) M35.1    Impaired mobility and ADLs Z74.09    Acute renal failure superimposed on stage 3 chronic kidney disease (HCC) N17.9, N18.3    Hypomagnesemia E83.42    Hyperuricemia E79.0    History of vitamin D deficiency Z86.39    Urge urinary incontinence N39.41        Review of Systems:  Constitutional: No fever, no chills, no weight loss, no night sweats   HEENT: No epistaxis, no nasal drainage, no difficulty in swallowing, no redness in eyes  Respiratory: as above  Cardiovascular: no chest pain, no palpitations, no chronic leg edema, no syncope  Gastrointestinal: no abd pain, no vomiting, no diarrhea, no bleeding symptoms  Genitourinary: No urinary symptoms or hematuria  Integument/breast: No ulcers or rashes  Musculoskeletal:Neg  Neurological: No focal weakness, no seizures, no headaches  Behvioral/Psych: No anxiety, no depression       Objective:     Visit Vitals    LMP 11/25/2012        Physical Exam:  General/Neurology:  Coughing with audible crackling sounds at times. NAD. Head:  Normocephalic, without obvious abnormality, atraumatic. Eye:  No scleral icterus, no pallor, no cyanosis  Nose:  No sinus tenderness, no erythema, no induration, no discharge  Throat:  Lips, mucosa, and tongue normal. Teeth  Gums normal. No tonsillar enlargement, no erythema, no exudates. No oral thrush. Neck:  Supple, symmetric. No JVD. Thyroid: no enlargement/tenderness/nodule. No lymphadenopathy. Trachea midline  Back & spine:  Symmetric, no curvature  Chest wall:  No tenderness or deformity. No rash  Lung:  BS decrease, few audible crackles. No stridors. No wheezing. No prolonged expiration. No accessory muscle use. Heart:   Regular rate & rhythm. S1 S2 present. No murmur. Abdomen/: Soft, NT, ND, +BS, no masses, no organomegaly  Extremities:  Bilateral pedal edema. No cyanosis.  No clubbing  Pulses:  2+ and symmetric in DP  Lymphatic:  No cervical, supraclavicular palpable lymphadenopathy. Musculoskeletal: Chronic arthritic pain. No joint swelling or tenderness. Skin:  Color, texture, turgor normal. No rashes or lesions    Data review:       Hospital Outpatient Visit on 07/28/2017   Component Date Value Ref Range Status    [facility] SPECIMEN COL 07/28/2017 Specimens collected/sent to [facility]    Final   Admission on 05/10/2017, Discharged on 05/31/2017   No results displayed because visit has over 200 results. Imaging:  I have personally reviewed the patients radiographs and have reviewed the reports:       Results from Appointment encounter on 09/12/17   XR CHEST PA LAT   Narrative Chest    Indication: copd exacer     Comparison: May 5, 2017. Findings: Two views. No pneumothorax. No pleural effusion. Bilateral lower  lung zone atelectasis. Chronic bilateral pleural-parenchymal changes with  superimposed right lower lung zone airspace opacity Cardiomediastinal silhouette  and osseous structures grossly unchanged. Impression Impression: Right lower lung zone airspace opacity may represent early pneumonia  in the appropriate clinical setting.    -           Results from Hospital Encounter encounter on 05/01/17   CT ELBOW RT WO CONT   Narrative CT right elbow without IV contrast    HISTORY: Cellulitis. No IV contrast requested due to impaired renal function. All CT scans at this facility are performed using dose optimization technique as  appropriate to a performed exam, to include automated exposure control,  adjustment of the mA and/or kV according to patient size (including appropriate  matching for site specific examination) or use of iterative reconstruction  technique. Axial CT images were obtained. Sagittal and coronal images were reformatted. There is posterior subcutaneous inflammation around the upper elbow to the mid  forearm. Small fluid collection or abscess not completely excluded.  No large  collection identified on noncontrast study. Mild edema in the triceps with no  discrete fluid collection. No soft tissue air. No radiopaque foreign bodies. No bony erosion or fracture. No elbow joint effusion. There is irregular  sclerosis at the capitellum. Radial head is intact. Impression IMPRESSION:    Posterior subcutaneous cellulitis from the upper elbow to mid forearm. No large  fluid collection identified. No joint effusion. No bony erosion. Heterogeneous  sclerosis at the capitellum, developing osteonecrosis possible. Results for orders placed or performed during the hospital encounter of 10/31/16   EKG, 12 LEAD, INITIAL   Result Value Ref Range    Ventricular Rate 102 BPM    Atrial Rate 102 BPM    P-R Interval 116 ms    QRS Duration 76 ms    Q-T Interval 368 ms    QTC Calculation (Bezet) 479 ms    Calculated P Axis 50 degrees    Calculated R Axis 44 degrees    Calculated T Axis 43 degrees    Diagnosis       Sinus tachycardia  Possible Left atrial enlargement  Borderline ECG  When compared with ECG of 02-JUL-2016 22:40,  No significant change was found  Confirmed by Holli Cui ([id number]) on 11/1/2016 8:00:17 AM             Results from Hospital Encounter encounter on 05/30/13   US RETROPERITONEUM COMP   Narrative PROCEDURE:  Ultrasound Retroperitoneal    CPT code 15429    INDICATION:  Acute renal failure. .    COMPARISON:  Ultrasound right upper quadrant 11/4/11. CT 6/4/10. FINDINGS:  The right kidney measures 11.1 x 4.9 x 5.2 cm. The left kidney  measures 10.8 x 4.1 x 5.2 cm. No evidence of solid or cystic mass,  hydronephrosis or ectopic calcification is detected in either kidney. The  renal parenchymal echogenicity is unremarkable. The bladder is contracted with Renae in place. No free intraperitoneal fluid  is seen. The visualized portions of the liver and spleen are unremarkable. The spleen  measures 11.3 x 4.7 x 11.4 cm. The inferior vena cava and aorta are not well  visualized. Impression IMPRESSION:    Unremarkable ultrasound of the kidneys. Lab Results   Component Value Date/Time    D DIMER 0.28 11/01/2016 04:13 PM          Ms. Sinha has a reminder for a \"due or due soon\" health maintenance. I have asked that she contact her primary care provider for follow-up on this health maintenance. IMPRESSION:   · Persistent COPD exacerbation likely trigger; acute bronchitis-not resolved, continue smoking (1/2 PPD and environmental (cold weather) exposure. RECOMMENDATIONS:   · Doxycyline 100 mg BID X 14 days, script send, med's actions and adverse reaction reviewed. · Prednisone 40 mg X 7 days-pt is diabetic, instructed to monitor glucose closely, s/s hyperglycemia, for glucose elevation and hyperglycemia-call PCP for management. · Instructed Albuterol by nebulization every 4 hours for 2-3 days for persistent wheezing, then as needed. If still using too often call the clinic, for severe symptoms go to hospital   · Continue Anoro as px , proper use and inhaler technique reviewed. · Continue O2/NC supplements, keep sats>90%. · Warning signs of respiratory distress were reviewed with the patient   · Discussed avoidance of precipitants/ avoid or limit going out on cold weather. · Absolute smoking cessation instructed, modest strategy encouraged  · Pulmonary toilette, encouraged deep breathing, hydrate with warm drink/soups, humidifier, well balanced meal, and activity as tolerated. · Reviewed all medications and discussed concerns, answered questions. · Follow-up 1 month or come back sooner should new symptoms or problems arise.        Deann Chacon MD

## 2017-11-28 ENCOUNTER — OFFICE VISIT (OUTPATIENT)
Dept: PULMONOLOGY | Age: 62
End: 2017-11-28

## 2017-11-28 ENCOUNTER — HOSPITAL ENCOUNTER (OUTPATIENT)
Dept: LAB | Age: 62
Discharge: HOME | End: 2017-11-28

## 2017-11-28 VITALS
SYSTOLIC BLOOD PRESSURE: 134 MMHG | OXYGEN SATURATION: 97 % | WEIGHT: 195 LBS | BODY MASS INDEX: 32.49 KG/M2 | HEART RATE: 88 BPM | TEMPERATURE: 98.2 F | HEIGHT: 65 IN | RESPIRATION RATE: 18 BRPM | DIASTOLIC BLOOD PRESSURE: 70 MMHG

## 2017-11-28 DIAGNOSIS — J44.1 COPD EXACERBATION (HCC): Primary | ICD-10-CM

## 2017-11-28 DIAGNOSIS — F17.200 TOBACCO DEPENDENCE: ICD-10-CM

## 2017-11-28 DIAGNOSIS — J44.9 STAGE 3 SEVERE COPD BY GOLD CLASSIFICATION (HCC): ICD-10-CM

## 2017-11-28 DIAGNOSIS — Z99.81 HYPOXEMIA REQUIRING SUPPLEMENTAL OXYGEN: ICD-10-CM

## 2017-11-28 DIAGNOSIS — R09.02 HYPOXEMIA REQUIRING SUPPLEMENTAL OXYGEN: ICD-10-CM

## 2017-11-28 DIAGNOSIS — J18.9 COMMUNITY ACQUIRED PNEUMONIA OF RIGHT LOWER LOBE OF LUNG: ICD-10-CM

## 2017-11-28 DIAGNOSIS — R05.8 PRODUCTIVE COUGH: ICD-10-CM

## 2017-11-28 DIAGNOSIS — N18.31 CKD STAGE G3A/A1, GFR 45-59 AND ALBUMIN CREATININE RATIO <30 MG/G (HCC): Chronic | ICD-10-CM

## 2017-11-28 PROCEDURE — 99001 SPECIMEN HANDLING PT-LAB: CPT | Performed by: INTERNAL MEDICINE

## 2017-11-28 RX ORDER — CEPHALEXIN 500 MG/1
500 CAPSULE ORAL 4 TIMES DAILY
Qty: 28 CAP | Refills: 0 | Status: SHIPPED | OUTPATIENT
Start: 2017-11-28 | End: 2018-01-03 | Stop reason: ALTCHOICE

## 2017-11-28 RX ORDER — PREDNISONE 20 MG/1
20 TABLET ORAL
Qty: 7 TAB | Refills: 0 | Status: SHIPPED | OUTPATIENT
Start: 2017-11-28 | End: 2017-12-12 | Stop reason: SDUPTHER

## 2017-11-28 NOTE — PROGRESS NOTES
HISTORY OF PRESENT ILLNESS  Quentin Sherwood is a 65 y.o. female. HPI Comments: History of Sarcoidosis and COPD, last FEV 1 in 2012 was 51%. Pt unable to perform spirometry on past few visits due to illness. Pt was recently seen by Dr. Keila Shepard and NP Trixie Carrasquillo for persistent exacerbation and started on Prednisone and Doxycycline. Pt noted initial response fly to Prednisone however SOB and cough recurred after completing Prednisone course. Pt admits to going back to smoking 1 ppd. Uses O2 despite smoking. Pt reports tripping and falling over her O2 tubing this am, landing on her face. No LOC or other sequelae. Pt with history of CKD but has admitted poor compliance with nephrology visits. Shortness of Breath   Associated symptoms include cough, sputum production ( whitish) and wheezing. Pertinent negatives include no fever, no headaches, no sore throat, no ear pain, no neck pain, no hemoptysis, no PND, no chest pain, no vomiting, no abdominal pain, no rash and no claudication. Orthopnea: mild  Leg swelling: chronic. Cough with sputum   Associated symptoms include shortness of breath. Pertinent negatives include no chest pain, no abdominal pain and no headaches. Review of Systems   Constitutional: Positive for malaise/fatigue. Negative for chills, diaphoresis, fever and weight loss. HENT: Positive for congestion and sinus pain. Negative for ear discharge, ear pain, hearing loss, nosebleeds, sore throat and tinnitus. Eyes: Negative for blurred vision, double vision, photophobia, pain, discharge and redness. Respiratory: Positive for cough, sputum production ( whitish), shortness of breath and wheezing. Negative for hemoptysis and stridor. Cardiovascular: Negative for chest pain, palpitations, claudication and PND. Orthopnea: mild  Leg swelling: chronic. Gastrointestinal: Negative for abdominal pain, blood in stool, constipation, diarrhea, heartburn, melena, nausea and vomiting. Genitourinary: Negative for dysuria, flank pain, frequency, hematuria and urgency. Musculoskeletal: Positive for back pain and joint pain. Negative for falls, myalgias and neck pain. Skin: Negative for itching and rash. Neurological: Negative for dizziness, tingling, tremors, sensory change, speech change, focal weakness, seizures, loss of consciousness, weakness and headaches. Endo/Heme/Allergies: Negative for environmental allergies and polydipsia. Does not bruise/bleed easily. Psychiatric/Behavioral: Positive for depression. Negative for hallucinations, memory loss, substance abuse and suicidal ideas. The patient is nervous/anxious. The patient does not have insomnia.       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 congestive heart failure     Better controlled     Bipolar affective disorder ([facility] 12/5/2012    Cellulitis of right forearm 5/4/2017    Chronic back pain     Chronic obstructive pulmonary disease (COPD) (HCC)     SOB, on paula O2 Recent admission with psychosis     CKD stage G3a/A1, GFR 45-59 and albumin creatinine ratio <30 mg/g 5/11/2017    Urine microalbumin/Creatinine ratio (5/11/2017) = 9 mg/g    Contusion of left elbow 5/4/2017    Depression     Diabetic neuropathy associated with type 2 diabetes mellitus (HCC)     Diastolic dysfunction without heart failure     Stable on diuretics     Falls frequently     Gastroesophageal reflux disease with hiatal hernia     Generalized osteoarthritis of multiple sites     History of acute renal failure 5/31/2013    History of back injury     jumped out of second story window     History of hepatitis C     treated    History of penicillin allergy     History of vitamin D deficiency 5/10/2017    Hyperuricemia 5/26/2017    Hypothyroidism     Hypoxemia requiring supplemental oxygen 12/29/2014    Intravenous drug user 5/2/2017    Memory difficulty     Mixed connective tissue disease ([facility] 5/10/2017    Obesity, Class I, BMI 30-34.9     Olecranon bursitis of right elbow 5/4/2017    Recurrent genital herpes 5/31/2013    Right Achilles tendinitis 5/2/2017    Sarcoidosis     Sickle cell trait ([facility])     Type 2 diabetes with stage 3 chronic kidney disease GFR 30-59 ([facility])     Urge urinary incontinence 5/10/2017    Venous insufficiency     Wears glasses      Current Outpatient Prescriptions on File Prior to Visit   Medication Sig Dispense Refill    metOLazone (ZAROXOLYN) 2.5 mg tablet Take  by mouth daily.  albuterol (PROVENTIL HFA) 90 mcg/actuation inhaler Take 2 Puffs by inhalation every four (4) hours as needed for Wheezing. 1 Inhaler 3    allopurinol (ZYLOPRIM) 100 mg tablet Take  by mouth daily.  ANORO ELLIPTA 62.5-25 mcg/actuation inhaler INHALE 1 PUFF DAILY 1 Inhaler 5    clopidogrel (PLAVIX) 75 mg tab Take  by mouth.  famotidine (PEPCID) 20 mg tablet Take 1 Tab by mouth daily. Indications: PREVENTION OF STRESS ULCER 15 Tab 0    insulin glargine (LANTUS) 100 unit/mL injection Inject 70 units subcutaneously before breakfast (7AM) and 40 units subcutaneously after dinner (7PM). Indications: type 2 diabetes mellitus 1 Vial 0    insulin lispro (HUMALOG) 100 unit/mL injection To be given 15 min before meals: < 150 - None, 151-200 - 2 u, 201-250 - 4 u, 251-300 - 6 u, 301-350 - 8 u, 351-400 - 10 u, >400 - 12 u 1 Vial 0    aspirin 81 mg chewable tablet Take 1 Tab by mouth daily (with breakfast). Indications: prevention of cerebrovascular accident 13 Tab 0    levothyroxine (SYNTHROID) 100 mcg tablet Take 1 Tab by mouth Daily (before breakfast). Indications: hypothyroidism 15 Tab 0    tolterodine (DETROL) 1 mg tablet Take 1 Tab by mouth two (2) times a day. Indications: URINARY URGE INCONTINENCE 30 Tab 0    cholecalciferol (VITAMIN D3) 1,000 unit tablet Take 2 Tabs by mouth daily. Indications: PREVENTION OF VITAMIN D DEFICIENCY 30 Tab 0    divalproex DR (DEPAKOTE) 250 mg tablet Take 250 mg by mouth nightly. Indications: BIPOLAR DISORDER IN REMISSION      insulin syringe,safetyneedle 1 mL 30 gauge x 5/16\" syrg As directed 50 Each 0    ARIPiprazole (ABILIFY) 5 mg tablet Take 10 mg by mouth daily. Indications: BIPOLAR DISORDER IN REMISSION      albuterol (PROVENTIL VENTOLIN) 2.5 mg /3 mL (0.083 %) nebulizer solution USE 1 NEB EVERY 4 HOURS FOR WHEEZING AND SHORTNESS OF BREATH  Indications: CHRONIC OBSTRUCTIVE PULMONARY DISEASE 2 Package 3    gabapentin (NEURONTIN) 300 mg capsule Take 300 mg by mouth three (3) times daily. Indications: NEUROPATHIC PAIN      acetaminophen (TYLENOL) 325 mg tablet Take 325 mg by mouth every six (6) hours as needed for Pain. Indications: Fever, Pain      traZODone (DESYREL) 100 mg tablet Take 100 mg by mouth nightly. Indications: major depressive disorder      rosuvastatin (CRESTOR) 5 mg tablet Take 1 Tab by mouth nightly. 30 Tab 6    l-methylfolate-vit b12-vit b6 (METANX) 2.8-2-25 mg Tab Take 1 Tab by mouth daily.  Nebulizer Accessories Kit Use with nebulizer machine 1 Kit prn    sertraline (ZOLOFT) 100 mg tablet Take 50 mg by mouth daily. Indications: DEPRESSION ASSOCIATED WITH BIPOLAR DISORDER      Oxygen-Air Delivery Systems by Nasal route continuous. 2 LPM via NC  Indications: Hypoxemia requiring supplementary oxygen      doxycycline (MONODOX) 100 mg capsule Take 1 Cap by mouth two (2) times a day. 28 Cap 0    predniSONE (DELTASONE) 20 mg tablet Take 1 Tab by mouth daily (with breakfast).  Indications: wheezing 14 Tab 0    predniSONE (DELTASONE) 10 mg tablet 30 mg po dailyx 3 days,20 mg po daily x 3 days,10 mg po daily x 3 days 18 Tab 0    azithromycin (ZITHROMAX) 250 mg tablet Take as directed on packaging 6 Tab 0    predniSONE (DELTASONE) 10 mg tablet Take 2 Tabs by mouth daily (with breakfast). 10 Tab 0    traMADol (ULTRAM) 50 mg tablet       oxyCODONE-acetaminophen (PERCOCET) 5-325 mg per tablet Take 1 Tab by mouth every six (6) hours as needed for Pain. Max Daily Amount: 4 Tabs. Indications: Pain 15 Tab 0     No current facility-administered medications on file prior to visit. Allergies   Allergen Reactions    Moxifloxacin Rash    Pcn [Penicillins] Swelling    Sulfa (Sulfonamide Antibiotics) Swelling     Social History     Social History    Marital status: SINGLE     Spouse name: N/A    Number of children: N/A    Years of education: N/A     Occupational History    Not employed Not Employed     Social History Main Topics    Smoking status: Light Tobacco Smoker     Packs/day: 0.50     Years: 30.00     Types: Cigarettes     Start date: 12/2/1969     Last attempt to quit: 10/31/2016    Smokeless tobacco: Never Used      Comment: Per pt. 10 a day     Alcohol use No    Drug use: No      Comment: +Cocaine Screen 2013    Sexual activity: Not on file      Comment: 2014     Other Topics Concern    Not on file     Social History Narrative    Lives with 15year old son. Blood pressure 134/70, pulse 88, temperature 98.2 °F (36.8 °C), temperature source Oral, resp. rate 18, height 5' 5\" (1.651 m), weight 88.5 kg (195 lb), last menstrual period 11/25/2012, SpO2 97 %. Physical Exam   Constitutional: She is oriented to person, place, and time. She appears well-developed and well-nourished. No distress. HENT:   Head: Normocephalic. Nose: Nose normal.   Swelling and tenderness over mid upper lip. No bleeding   Eyes: Conjunctivae are normal. Pupils are equal, round, and reactive to light. Right eye exhibits no discharge. Left eye exhibits no discharge. No scleral icterus. Neck: No JVD present. No tracheal deviation present. No thyromegaly present.    Cardiovascular: Normal rate, regular rhythm and intact distal pulses. Exam reveals no gallop. No murmur heard. Pulmonary/Chest: Effort normal and breath sounds normal. No stridor. No respiratory distress. Wheezes: right greater than left upper lung fields. She has no rales. She exhibits no tenderness. Distant breath sounds, rhonchi right base   Abdominal: Soft. She exhibits no mass. There is no tenderness. Musculoskeletal: She exhibits no tenderness. Edema: trace    Lymphadenopathy:     She has no cervical adenopathy. Neurological: She is alert and oriented to person, place, and time. Skin: Skin is warm and dry. No rash noted. She is not diaphoretic. No erythema. Large ecchymosis medial left forearm   Psychiatric: She has a normal mood and affect. Her behavior is normal. Judgment and thought content normal.       CXR ANDRE POWER personal review: RLL infiltrate increased C/w 9/2017, hyperinflation    ASSESSMENT and PLAN  Encounter Diagnoses   Name Primary?  COPD exacerbation (HCC) Yes    Productive cough     CKD stage G3a/A1, GFR 45-59 and albumin creatinine ratio <30 mg/g     Stage 3 severe COPD by GOLD classification (HCC)     Hypoxemia requiring supplemental oxygen     Tobacco dependence        Continue current medications including Anoro. Titrate FiO2 to saturation greater than 90%. encouraged   Strongly counseled on need for smoking cessation. Schedule PFT's  Will resume Prednisone at 20 mg daily x 7 days. Start Keflex as pt with allergies to Avelox and PCN. Anticipate only low chance of cross reactivity. Pt recently on macrolide and Doxycycline so would not Rx this again. Reviewed CXR results with pt. RTC 2 months, spirometry and repeat CXR then.  If infiltrate persists would check CT chest

## 2017-11-28 NOTE — MR AVS SNAPSHOT
Visit Information Date & Time Provider Department Dept. Phone Encounter #  
 11/28/2017 12:15 PM Ebonie Keane MD [facility] [id number] Follow-up Instructions Return in about 2 months (around 1/28/2018). Follow-up and Disposition History Your Appointments 1/26/2018  9:30 AM  
Nurse Visit with [facility] SPIROMETRY [address] ([facility]) Appt Note: [email or web address]  
 [address] [place] [address], [address] [address]  
[phone]  
  
   
 [address] [place] [id number]  
  
    
 1/26/2018 10:00 AM  
XRAY with PPA XRAY [address] ([facility]) Appt Note: APPANDREA [email or web address]  
 [address] [place] [address], [address] [address]  
[phone]  
  
   
 [address] [place] [address], [address] [place] [address],[address] [facility] [address]  
  
    
 1/26/2018 10:15 AM  
Follow Up with Ebonie Keane MD  
[address] ([facility]) Appt Note: FROM 11/28/17 - [email or web address]  
 [address] [place] [address], [address] [address]  
[phone]  
  
   
 [address] [place] [address], [address] [place] [address],[address] [facility] [address]  
  
    
 2/22/2018  9:30 AM  
Office Visit with Tree Shelley MD  
[facility]) Appt Note: [address] [facility], [address] [facility] [address]  
[address], [address] [address] Upcoming Health Maintenance Date Due  
 FOOT EXAM Q1 9/12/1966 EYE EXAM RETINAL OR DILATED Q1 9/12/1966 DTaP/Tdap/Td series (1 - Tdap) 9/12/1977 FOBT Q 1 YEAR AGE 50-75 9/12/2006 BREAST CANCER SCRN MAMMOGRAM 3/20/2015 PAP AKA CERVICAL CYTOLOGY 8/27/2015 ZOSTER VACCINE AGE 60> 7/12/2016 HEMOGLOBIN A1C Q6M 10/20/2017 LIPID PANEL Q1 11/6/2017 MICROALBUMIN Q1 5/11/2018 Allergies as of 11/28/2017  Review Complete On: 11/28/2017 By: Ebonie Keane MD  
  
 Severity Noted Reaction Type Reactions Moxifloxacin  09/24/2015    Rash Pcn [Penicillins]    Swelling Sulfa (Sulfonamide Antibiotics)    Swelling Current Immunizations  Reviewed on 10/25/2017 Name Date Influenza Vaccine 10/2/2017, 10/10/2015, 12/29/2014 Influenza Vaccine Split 10/30/2012 Pneumococcal Polysaccharide (PPSV-23) 7/10/2015 Not reviewed this visit You Were Diagnosed With   
  
 Codes Comments COPD exacerbation ([facility]    -  Primary ICD-10-CM: J44.1 ICD-9-CM: 491.21 Productive cough     ICD-10-CM: R05 ICD-9-CM: 786.2 CKD stage G3a/A1, GFR 45-59 and albumin creatinine ratio <30 mg/g     ICD-10-CM: N18.3 ICD-9-CM: 021. 3 Stage 3 severe COPD by GOLD classification ([facility]     ICD-10-CM: J44.9 ICD-9-CM: 105 Hypoxemia requiring supplemental oxygen     ICD-10-CM: R09.02, Z99.81 ICD-9-CM: 799.02 Tobacco dependence     ICD-10-CM: T02.397 ICD-9-CM: 305.1 Community acquired pneumonia of right lower lobe of lung ([facility]     ICD-10-CM: J18.1 ICD-9-CM: 266 Vitals BP Pulse Temp Resp Height(growth percentile) Weight(growth percentile) 134/70 (BP 1 Location: Left arm, BP Patient Position: Sitting) 88 98.2 °F (36.8 °C) (Oral) 18 5' 5\" (1.651 m) 195 lb (88.5 kg) LMP SpO2 BMI OB Status Smoking Status 11/25/2012 97% 32.45 kg/m2 Postmenopausal Light Tobacco Smoker BMI and BSA Data Body Mass Index Body Surface Area  
 32.45 kg/m 2 2.01 m 2 Preferred Pharmacy Pharmacy Name [facility] [address] [phone] Your Updated Medication List  
  
   
This list is accurate as of: 11/28/17  1:03 PM.  Always use your most recent med list.  
  
  
  
  
 ABILIFY 5 mg tablet Generic drug:  ARIPiprazole Take 10 mg by mouth daily. Indications: BIPOLAR DISORDER IN REMISSION  
  
 * albuterol 2.5 mg /3 mL (0.083 %) nebulizer solution Commonly known as:  PROVENTIL VENTOLIN  
 USE 1 NEB EVERY 4 HOURS FOR WHEEZING AND SHORTNESS OF BREATH  Indications: CHRONIC OBSTRUCTIVE PULMONARY DISEASE * albuterol 90 mcg/actuation inhaler Commonly known as:  PROVENTIL HFA Take 2 Puffs by inhalation every four (4) hours as needed for Wheezing. allopurinol 100 mg tablet Commonly known as:  Donte Locke Take  by mouth daily. ANORO ELLIPTA 62.5-25 mcg/actuation inhaler Generic drug:  umeclidinium-vilanterol INHALE 1 PUFF DAILY  
  
 aspirin 81 mg chewable tablet Take 1 Tab by mouth daily (with breakfast). Indications: prevention of cerebrovascular accident  
  
 azithromycin 250 mg tablet Commonly known as:  Sherren Mohair Take as directed on packaging  
  
 cephALEXin 500 mg capsule Commonly known as:  Kalpana Rota Take 1 Cap by mouth four (4) times daily. cholecalciferol 1,000 unit tablet Commonly known as:  VITAMIN D3 Take 2 Tabs by mouth daily. Indications: PREVENTION OF VITAMIN D DEFICIENCY  
  
 DEPAKOTE 250 mg tablet Generic drug:  divalproex DR Take 250 mg by mouth nightly. Indications: BIPOLAR DISORDER IN REMISSION  
  
 doxycycline 100 mg capsule Commonly known as:  Balinda Vu Take 1 Cap by mouth two (2) times a day. famotidine 20 mg tablet Commonly known as:  PEPCID Take 1 Tab by mouth daily. Indications: PREVENTION OF STRESS ULCER  
  
 insulin glargine 100 unit/mL injection Commonly known as:  LANTUS Inject 70 units subcutaneously before breakfast (7AM) and 40 units subcutaneously after dinner (7PM). Indications: type 2 diabetes mellitus  
  
 insulin lispro 100 unit/mL injection Commonly known as:  HUMALOG To be given 15 min before meals: < 150 - None, 151-200 - 2 u, 201-250 - 4 u, 251-300 - 6 u, 301-350 - 8 u, 351-400 - 10 u, >400 - 12 u  
  
 insulin syringe,safetyneedle 1 mL 30 gauge x 5/16\" Syrg As directed  
  
 levothyroxine 100 mcg tablet Commonly known as:  SYNTHROID  
 Take 1 Tab by mouth Daily (before breakfast). Indications: hypothyroidism METANX 2.8-2-25 mg Tab Generic drug:  l-methylfolate-vit b12-vit b6 Take 1 Tab by mouth daily. metOLazone 2.5 mg tablet Commonly known as:  Letitia Pore Take  by mouth daily. Nebulizer Accessories Kit Use with nebulizer machine NEURONTIN 300 mg capsule Generic drug:  gabapentin Take 300 mg by mouth three (3) times daily. Indications: NEUROPATHIC PAIN  
  
 oxyCODONE-acetaminophen 5-325 mg per tablet Commonly known as:  PERCOCET Take 1 Tab by mouth every six (6) hours as needed for Pain. Max Daily Amount: 4 Tabs. Indications: Pain Oxygen-Air Delivery Systems  
by Nasal route continuous. 2 LPM via NC  Indications: Hypoxemia requiring supplementary oxygen PLAVIX 75 mg Tab Generic drug:  clopidogrel Take  by mouth. * predniSONE 10 mg tablet Commonly known as:  [place] Esters Take 2 Tabs by mouth daily (with breakfast). * predniSONE 10 mg tablet Commonly known as:  DELTASONE  
30 mg po dailyx 3 days,20 mg po daily x 3 days,10 mg po daily x 3 days * predniSONE 20 mg tablet Commonly known as:  Imani Esters Take 1 Tab by mouth daily (with breakfast). Indications: wheezing * predniSONE 20 mg tablet Commonly known as:  [place] Esters Take 1 Tab by mouth daily (with breakfast). rosuvastatin 5 mg tablet Commonly known as:  CRESTOR Take 1 Tab by mouth nightly. tolterodine 1 mg tablet Commonly known as:  Mony Puna Take 1 Tab by mouth two (2) times a day. Indications: URINARY URGE INCONTINENCE  
  
 traMADol 50 mg tablet Commonly known as:  ULTRAM  
  
 traZODone 100 mg tablet Commonly known as:  Parminder Gomes Take 100 mg by mouth nightly. Indications: major depressive disorder TYLENOL 325 mg tablet Generic drug:  acetaminophen Take 325 mg by mouth every six (6) hours as needed for Pain. Indications: Fever, Pain ZOLOFT 100 mg tablet Generic drug:  sertraline Take 50 mg by mouth daily. Indications: DEPRESSION ASSOCIATED WITH BIPOLAR DISORDER * Notice: This list has 6 medication(s) that are the same as other medications prescribed for you. Read the directions carefully, and ask your doctor or other care provider to review them with you. Prescriptions Sent to Pharmacy Refills  
 predniSONE (DELTASONE) 20 mg tablet 0 Sig: Take 1 Tab by mouth daily (with breakfast). Class: Normal  
 Pharmacy: [facility], [address] Ph #: [phone] Route: Oral  
 cephALEXin (KEFLEX) 500 mg capsule 0 Sig: Take 1 Cap by mouth four (4) times daily. Class: Normal  
 Pharmacy: [facility], [address] Ph #: [phone] Route: Oral  
  
We Performed the Following CULTURE, RESPIRATORY/SPUTUM/BRONCH Ciro Oven STAIN [15432 CPT(R)] Follow-up Instructions Return in about 2 months (around 1/28/2018). To-Do List   
 11/28/2017 Imaging:  XR CHEST PA LAT [facility]! Dear Janie Mello: Thank you for requesting a [employer] account. Our records indicate that you have previously registered for a [employer] account but its currently inactive. Please call our [employer] support line at [phone]. Additional Information If you have questions, please visit the Frequently Asked Questions section of the [employer] website at [email or web address] Remember, [employer] is NOT to be used for urgent needs. For medical emergencies, dial 911. Now available from your iPhone and Android! Please provide this summary of care documentation to your next provider. Your primary care clinician is listed as Hemant Rogers. If you have any questions after today's visit, please call [phone].

## 2017-12-01 LAB
BACTERIA SPT CULT: NORMAL
GRAM STN SPT: NORMAL
SQUAMOUS SPT QL MICRO: NORMAL
WBC SPT QL MICRO: NORMAL

## 2017-12-12 ENCOUNTER — TELEPHONE (OUTPATIENT)
Dept: PULMONOLOGY | Age: 62
End: 2017-12-12

## 2017-12-12 RX ORDER — PREDNISONE 10 MG/1
10 TABLET ORAL DAILY
Qty: 30 TAB | Refills: 1 | Status: CANCELLED | OUTPATIENT
Start: 2017-12-12

## 2017-12-12 NOTE — TELEPHONE ENCOUNTER
Pt states her sxs had gotten better after taking the Keflex and Prednisone given 11/28/17. On Friday the sxs of cough and congested returned. No fever but coughing up white sputum.  Pt feels she needs another antibiotic and prednisone

## 2017-12-12 NOTE — TELEPHONE ENCOUNTER
Pt states she doesn't have any Prednisone. Per Dr. Bola Elizabeth, she will send in Prednisone 10 mg daily to local pharmacy.

## 2017-12-13 RX ORDER — PREDNISONE 20 MG/1
TABLET ORAL
Qty: 7 TAB | Refills: 0 | Status: SHIPPED | OUTPATIENT
Start: 2017-12-13 | End: 2018-01-03 | Stop reason: ALTCHOICE

## 2018-01-02 ENCOUNTER — TELEPHONE (OUTPATIENT)
Dept: PULMONOLOGY | Age: 63
End: 2018-01-02

## 2018-01-02 NOTE — TELEPHONE ENCOUNTER
Pt states that she has been coughing up mucus, wheezing and having chest pain when she is coughing.  pls call pt

## 2018-01-02 NOTE — TELEPHONE ENCOUNTER
Pt states she has had a cough for a couple days. Coughing up grayish sputum. No fever. Pt states she started an over the counter cough med that has helped. Advise to continue with the over the counter med and if sxs persist, worsen or fever develops to call for same day appt.  Pt understands and agrees

## 2018-01-03 ENCOUNTER — OFFICE VISIT (OUTPATIENT)
Dept: PULMONOLOGY | Age: 63
End: 2018-01-03

## 2018-01-03 VITALS
OXYGEN SATURATION: 95 % | RESPIRATION RATE: 20 BRPM | BODY MASS INDEX: 34.82 KG/M2 | TEMPERATURE: 98.4 F | DIASTOLIC BLOOD PRESSURE: 80 MMHG | HEART RATE: 85 BPM | HEIGHT: 65 IN | SYSTOLIC BLOOD PRESSURE: 150 MMHG | WEIGHT: 209 LBS

## 2018-01-03 DIAGNOSIS — R09.02 HYPOXEMIA REQUIRING SUPPLEMENTAL OXYGEN: Chronic | ICD-10-CM

## 2018-01-03 DIAGNOSIS — K21.9 GASTROESOPHAGEAL REFLUX DISEASE WITH HIATAL HERNIA: Chronic | ICD-10-CM

## 2018-01-03 DIAGNOSIS — K44.9 GASTROESOPHAGEAL REFLUX DISEASE WITH HIATAL HERNIA: Chronic | ICD-10-CM

## 2018-01-03 DIAGNOSIS — D86.9 SARCOIDOSIS: Chronic | ICD-10-CM

## 2018-01-03 DIAGNOSIS — Z99.81 HYPOXEMIA REQUIRING SUPPLEMENTAL OXYGEN: Chronic | ICD-10-CM

## 2018-01-03 DIAGNOSIS — Z86.19 HISTORY OF HEPATITIS C: ICD-10-CM

## 2018-01-03 DIAGNOSIS — J42 CHRONIC BRONCHITIS, UNSPECIFIED CHRONIC BRONCHITIS TYPE (HCC): Primary | Chronic | ICD-10-CM

## 2018-01-03 DIAGNOSIS — D57.3 SICKLE CELL TRAIT (HCC): Chronic | ICD-10-CM

## 2018-01-03 DIAGNOSIS — M35.1 MIXED CONNECTIVE TISSUE DISEASE (HCC): Chronic | ICD-10-CM

## 2018-01-03 RX ORDER — PREDNISONE 10 MG/1
TABLET ORAL
Qty: 50 TAB | Refills: 0 | Status: SHIPPED | OUTPATIENT
Start: 2018-01-03 | End: 2018-02-22 | Stop reason: ALTCHOICE

## 2018-01-03 RX ORDER — AZITHROMYCIN 500 MG/1
500 TABLET, FILM COATED ORAL DAILY
Qty: 7 TAB | Refills: 0 | Status: SHIPPED | OUTPATIENT
Start: 2018-01-03 | End: 2018-02-22 | Stop reason: ALTCHOICE

## 2018-01-03 RX ORDER — CEFUROXIME AXETIL 500 MG/1
500 TABLET ORAL 2 TIMES DAILY
Qty: 14 TAB | Refills: 0 | Status: SHIPPED | OUTPATIENT
Start: 2018-01-03 | End: 2018-01-10

## 2018-01-03 RX ORDER — AMOXICILLIN AND CLAVULANATE POTASSIUM 875; 125 MG/1; MG/1
1 TABLET, FILM COATED ORAL EVERY 12 HOURS
Qty: 14 TAB | Refills: 0 | Status: CANCELLED | OUTPATIENT
Start: 2018-01-03 | End: 2018-01-10

## 2018-01-03 RX ORDER — CEFPODOXIME PROXETIL 200 MG/1
200 TABLET, FILM COATED ORAL 2 TIMES DAILY
Qty: 14 TAB | Refills: 0 | Status: SHIPPED | OUTPATIENT
Start: 2018-01-03 | End: 2018-01-03 | Stop reason: CLARIF

## 2018-01-03 NOTE — PROGRESS NOTES
[facility]  Pulmonary, Critical Care, and Sleep Medicine      Pulmonary Office Progress Notes    Name: Chariot Lopez     : 1955     Date: 1/3/2018        Subjective:     Patient is a 65 y.o. female is here for sick visit. Interval history:  Pt report worsening productive cough with white now beige colored mucus, no hemoptysis X 5 days. She report left chest discomfort when coughing or moving left UE. She report wheezing- use Albuterol via nebulizer twice daily  for the last few days. She report mild SOB with exertion unchanged-on cont O2/NC @ 2 Lpm.  She denies nocturnal awakening-report take OTC Thera-Flu, report helps to sleep better. She denies dysphagia or choking. She report independent with ADL's. Medication compliance- inhalers-Anoro every day , Albuterol inhaler/or via nebulizer PRN, Oxygen/NC .     2017 Seen in the office, treated with Keflex-pt tolerated med's well without adverse effect and Prednisone X 7 days. Report mild improvement, pt continue to smoke. 11/10/2017 Seen on sick visit COPD exacerbation/acute bronchitis, treated Doxycycline BID X 14 days and Prednisone 40 mg x 5 days. She report mild improvement. 10/25/2017 seen by Dr Naveen Quiroz for COPD exacerbation likely trigger; acute bronchitis, continue smoking and old weather exposure, treated with Z Pack and Prednisone taper. Smoking hx: Currently smoke 1/2 PPD, she report quit 6 yrs ago-cold turkey, plan to do that again.   She report use Nicotine patch previously 14 mg-unable to tolerate  Travel history: no  Sick contact: no  Seasonal Allergy: cold weather    Past Medical History:   Diagnosis Date    Abnormally low high density lipoprotein (HDL) cholesterol with hypertriglyceridemia     Lipid profile (2016) showed , , HDL 38, LDL 37    Anxiety     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 congestive heart failure Better controlled     Bipolar affective disorder ([facility] [address] 12/5/2012    Cellulitis of right forearm 5/4/2017    Chronic back pain     Chronic obstructive pulmonary disease (COPD) ([facility])     SOB, on paula O2 Recent admission with psychosis     CKD stage G3a/A1, GFR 45-59 and albumin creatinine ratio <30 mg/g 5/11/2017    Urine microalbumin/Creatinine ratio (5/11/2017) = 9 mg/g    Contusion of left elbow 5/4/2017    Depression     Diabetic neuropathy associated with type 2 diabetes mellitus ([facility])     Diastolic dysfunction without heart failure     Stable on diuretics     Falls frequently     Gastroesophageal reflux disease with hiatal hernia     Generalized osteoarthritis of multiple sites     History of acute renal failure 5/31/2013    History of back injury     jumped out of second story window     History of hepatitis C     treated    History of penicillin allergy     History of vitamin D deficiency 5/10/2017    Hyperuricemia 5/26/2017    Hypothyroidism     Hypoxemia requiring supplemental oxygen 12/29/2014    Intravenous drug user 5/2/2017    Memory difficulty     Mixed connective tissue disease ([facility] [address] 5/10/2017    Obesity, Class I, BMI 30-34.9     Olecranon bursitis of right elbow 5/4/2017    Recurrent genital herpes 5/31/2013    Right Achilles tendinitis 5/2/2017    Sarcoidosis     Sickle cell trait ([facility])     Type 2 diabetes with stage 3 chronic kidney disease GFR 30-59 ([facility] [address]     Urge urinary incontinence 5/10/2017    Venous insufficiency     Wears glasses      Social History     Social History    Marital status: SINGLE     Spouse name: N/A    Number of children: N/A    Years of education: N/A     Occupational History    Not employed Not Employed     Social History Main Topics    Smoking status: Light Tobacco Smoker     Packs/day: 0.50     Years: 30.00     Types: Cigarettes     Start date: 12/2/1969     Last attempt to quit: 10/31/2016    Smokeless tobacco: Never Used Comment: Per pt. 10 a day     Alcohol use No    Drug use: No      Comment: +Cocaine Screen     Sexual activity: Not on file      Comment: 2014     Other Topics Concern    Not on file     Social History Narrative    Lives with 15year old son. Past Surgical History:   Procedure Laterality Date    HX BACK SURGERY  1986    HX  SECTION      HX CYST REMOVAL Left 1979    Left foot    HX OTHER SURGICAL Left 2017    S/P incision and drainage of the abscess of the left hand and the left foot (2017 -  [facility] [place])    HX TUBAL LIGATION          Allergies   Allergen Reactions    Moxifloxacin Rash    Pcn [Penicillins] Swelling    Sulfa (Sulfonamide Antibiotics) Swelling       Current Outpatient Prescriptions   Medication Sig Dispense Refill    azithromycin (ZITHROMAX) 500 mg tab Take 1 Tab by mouth daily. Indications: uri/CAP 7 Tab 0    cefpodoxime (VANTIN) 200 mg tablet Take 1 Tab by mouth two (2) times a day. Indications: URI/CAP 14 Tab 0    predniSONE (DELTASONE) 10 mg tablet 40 mg x 5 days 30 mg X 5 days 20 mg X 5 days 10 mg x 5 days  Indications: COPD exacerbation 50 Tab 0    metOLazone (ZAROXOLYN) 2.5 mg tablet Take  by mouth daily.  albuterol (PROVENTIL HFA) 90 mcg/actuation inhaler Take 2 Puffs by inhalation every four (4) hours as needed for Wheezing. 1 Inhaler 3    allopurinol (ZYLOPRIM) 100 mg tablet Take  by mouth daily.  ANORO ELLIPTA 62.5-25 mcg/actuation inhaler INHALE 1 PUFF DAILY 1 Inhaler 5    clopidogrel (PLAVIX) 75 mg tab Take  by mouth.  traMADol (ULTRAM) 50 mg tablet       famotidine (PEPCID) 20 mg tablet Take 1 Tab by mouth daily. Indications: PREVENTION OF STRESS ULCER 15 Tab 0    aspirin 81 mg chewable tablet Take 1 Tab by mouth daily (with breakfast). Indications: prevention of cerebrovascular accident 13 Tab 0    levothyroxine (SYNTHROID) 100 mcg tablet Take 1 Tab by mouth Daily (before breakfast).  Indications: hypothyroidism 15 Tab 0    tolterodine (DETROL) 1 mg tablet Take 1 Tab by mouth two (2) times a day. Indications: URINARY URGE INCONTINENCE 30 Tab 0    cholecalciferol (VITAMIN D3) 1,000 unit tablet Take 2 Tabs by mouth daily. Indications: PREVENTION OF VITAMIN D DEFICIENCY 30 Tab 0    divalproex DR (DEPAKOTE) 250 mg tablet Take 250 mg by mouth nightly. Indications: BIPOLAR DISORDER IN REMISSION      insulin syringe,safetyneedle 1 mL 30 gauge x 5/16\" syrg As directed 50 Each 0    ARIPiprazole (ABILIFY) 5 mg tablet Take 10 mg by mouth daily. Indications: BIPOLAR DISORDER IN REMISSION      albuterol (PROVENTIL VENTOLIN) 2.5 mg /3 mL (0.083 %) nebulizer solution USE 1 NEB EVERY 4 HOURS FOR WHEEZING AND SHORTNESS OF BREATH  Indications: CHRONIC OBSTRUCTIVE PULMONARY DISEASE 2 Package 3    gabapentin (NEURONTIN) 300 mg capsule Take 300 mg by mouth three (3) times daily. Indications: NEUROPATHIC PAIN      acetaminophen (TYLENOL) 325 mg tablet Take 325 mg by mouth every six (6) hours as needed for Pain. Indications: Fever, Pain      traZODone (DESYREL) 100 mg tablet Take 100 mg by mouth nightly. Indications: major depressive disorder      rosuvastatin (CRESTOR) 5 mg tablet Take 1 Tab by mouth nightly. 30 Tab 6    sertraline (ZOLOFT) 100 mg tablet Take 50 mg by mouth daily. Indications: DEPRESSION ASSOCIATED WITH BIPOLAR DISORDER      insulin glargine (LANTUS) 100 unit/mL injection Inject 70 units subcutaneously before breakfast (7AM) and 40 units subcutaneously after dinner (7PM). Indications: type 2 diabetes mellitus 1 Vial 0    insulin lispro (HUMALOG) 100 unit/mL injection To be given 15 min before meals: < 150 - None, 151-200 - 2 u, 201-250 - 4 u, 251-300 - 6 u, 301-350 - 8 u, 351-400 - 10 u, >400 - 12 u 1 Vial 0    oxyCODONE-acetaminophen (PERCOCET) 5-325 mg per tablet Take 1 Tab by mouth every six (6) hours as needed for Pain. Max Daily Amount: 4 Tabs.  Indications: Pain 15 Tab 0    l-methylfolate-vit b12-vit b6 (METANX) 2.8-2-25 mg Tab Take 1 Tab by mouth daily.  Nebulizer Accessories Kit Use with nebulizer machine 1 Kit prn    Oxygen-Air Delivery Systems by Nasal route continuous. 2 LPM via NC  Indications: Hypoxemia requiring supplementary oxygen       Patient Active Problem List   Diagnosis Code    Type 2 diabetes with stage 3 chronic kidney disease GFR 30-59 ([facility] E11.22, N18.3    Diabetic neuropathy associated with type 2 diabetes mellitus ([facility] [address] E11.40    Venous insufficiency I87.2    Obesity, Class I, BMI 30-34.9 E66.9    Chronic back pain M54.9, G89.29    Generalized osteoarthritis of multiple sites M15.9    Bipolar affective disorder ([facility]) F31.9    Abnormally low high density lipoprotein (HDL) cholesterol with hypertriglyceridemia E78.6, R85.0    Diastolic dysfunction without heart failure I51.9    Chronic obstructive pulmonary disease (COPD) ([facility]) J44.9    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 congestive heart failure I13.10, N18.3    CKD stage G3a/A1, GFR 45-59 and albumin creatinine ratio <30 mg/g N18.3    Depression F32.9    History of back injury Z87.828    Anxiety F41.9    Wears glasses Z97.3    History of hepatitis C Z86.19    Sickle cell trait ([facility]) D57.3    History of acute renal failure Z87.448    Hypothyroidism E03.9    Recurrent genital herpes A60.00    Sarcoidosis D86.9    Abscess of right arm L02.413    Hypoxemia requiring supplemental oxygen R09.02, Z99.81    Abnormal nuclear stress test R94.39    Cellulitis and abscess of hand L03.119, L02.519    Cellulitis and abscess of foot L03.119, L02.619    SIRS (systemic inflammatory response syndrome) ([facility]) R65.10    Cellulitis of right forearm L03. 113    Olecranon bursitis of right elbow M70.21    Contusion of left elbow S50. 02XA    Intravenous drug user F19.90    Right Achilles tendinitis M76.61    History of penicillin allergy Z88.0    Hypokalemia E87.6    Falls frequently R29.6    Gastroesophageal reflux disease with hiatal hernia K21.9, K44.9    Memory difficulty R41.3    Mixed connective tissue disease (HCC) M35.1    Impaired mobility and ADLs Z74.09    Acute renal failure superimposed on stage 3 chronic kidney disease (HCC) N17.9, N18.3    Hypomagnesemia E83.42    Hyperuricemia E79.0    History of vitamin D deficiency Z86.39    Urge urinary incontinence N39.41        Review of Systems:  Constitutional: No fever, no chills, no weight loss, chronic night sweats   HEENT: No epistaxis, no nasal drainage, no difficulty in swallowing, no redness in eyes  Respiratory: as above  Cardiovascular: no chest pain, no palpitations, chronic leg edema, no syncope  Gastrointestinal: no abd pain, no vomiting, no diarrhea, no bleeding symptoms  Genitourinary: No urinary symptoms or hematuria  Integument/breast: No ulcers or rashes  Musculoskeletal:Neg  Neurological: No focal weakness, no seizures, no headaches    Objective:     Visit Vitals    /80 (BP 1 Location: Left arm, BP Patient Position: Sitting)    Pulse 85    Temp 98.4 °F (36.9 °C) (Oral)    Resp 20    Ht 5' 5\" (1.651 m)    Wt 94.8 kg (209 lb)    LMP 11/25/2012    SpO2 95%    BMI 34.78 kg/m2        Physical Exam:  General/Neurology:  On O2/NC @ 2 Lpm with SpO2 95%  NAD. Head:  Normocephalic, without obvious abnormality, atraumatic. Eye:  No scleral icterus, no pallor, no cyanosis  Nose:  No sinus tenderness, no erythema, no induration, no discharge  Throat:  Lips, mucosa, and tongue normal. Teeth  Gums normal. No tonsillar enlargement, no erythema, no exudates. No oral thrush. Neck:  Supple, symmetric. No JVD. Thyroid: no enlargement/tenderness/nodule. No lymphadenopathy. Trachea midline  Back & spine:  Symmetric. Chest wall:  No tenderness or deformity. No rash  Lung:  BS decrease, coarse BS/ exp wheezing. No prolonged expiration. No accessory muscle use. Heart:   Regular rate & rhythm.  S1 S2 present. No murmur. Abdomen/: Soft, NT, ND, +BS, no masses, no organomegaly  Extremities:  Bilateral pedal edema. No cyanosis. No clubbing  Pulses:  2+ and symmetric in DP  Lymphatic:  No cervical, supraclavicular palpable lymphadenopathy. Musculoskeletal: Chronic arthritic pain. No joint swelling or tenderness. Skin:  Color, texture, turgor normal. No rashes or lesions    Data review:       Office Visit on 11/28/2017   Component Date Value Ref Range Status    White Blood Cells 11/28/2017 Few   Final    Epithelial Cells 11/28/2017 Few   Final    Result 1 11/28/2017 Few gram positive cocci   Final    Result 2 11/28/2017 Small amount of yeast seen. Final    Result 3 11/28/2017 Few gram negative diplococci. Final    Result 4 11/28/2017 CANCELED   Final-Edited    Comment: Test not performed    Result canceled by the ancillary      Gram Stain Evaluation 11/28/2017    Final                    Value: This specimen is of good quality and is acceptable for routine  bacterial culture.  Lower Respiratory Culture 11/28/2017    Final                    Value:Routine alcides  Heavy growth     Hospital Outpatient Visit on 07/28/2017   Component Date Value Ref Range Status    [facility] SPECIMEN COL 07/28/2017 Specimens collected/sent to [facility]    Final       Imaging:  I have personally reviewed the patients radiographs and have reviewed the reports:       Results from Appointment encounter on 11/28/17   XR CHEST PA LAT   Narrative PROCEDURE:  Chest Frontal and Lateral.    CPT code 18155. INDICATION:  COPD exacerbation, productive cough      COMPARISON:  9/12/17, 5/5/17, 5/2/17, 4/12/17. FINDINGS:  Both lungs remain hyperinflated with patchy areas of increased lung  lucency and slight flattening of the diaphragms. The right lung base lateral  aspect subtle focal area of increased streaky densities is again observed.    Increased peripheral septal line thickening is observed bilaterally. Cardiomediastinal silhouette appears unremarkable. Multiple scattered foci of  scarring are observed. Impression IMPRESSION:    1. COPD/emphysematous changes. 2.  Possible concurrent interstitial lung disease. 3.  Persistent asymmetrically mildly increased streaky densities in the right  lung base lateral aspect near the costophrenic angle. Persistent infiltrate  cannot be excluded. Correlation with CT would be helpful for further  delineation. Results from [place] encounter on 05/01/17   CT ELBOW RT WO CONT   Narrative CT right elbow without IV contrast    HISTORY: Cellulitis. No IV contrast requested due to impaired renal function. All CT scans at this facility are performed using dose optimization technique as  appropriate to a performed exam, to include automated exposure control,  adjustment of the mA and/or kV according to patient size (including appropriate  matching for site specific examination) or use of iterative reconstruction  technique. Axial CT images were obtained. Sagittal and coronal images were reformatted. There is posterior subcutaneous inflammation around the upper elbow to the mid  forearm. Small fluid collection or abscess not completely excluded. No large  collection identified on noncontrast study. Mild edema in the triceps with no  discrete fluid collection. No soft tissue air. No radiopaque foreign bodies. No bony erosion or fracture. No elbow joint effusion. There is irregular  sclerosis at the capitellum. Radial head is intact. Impression IMPRESSION:    Posterior subcutaneous cellulitis from the upper elbow to mid forearm. No large  fluid collection identified. No joint effusion. No bony erosion. Heterogeneous  sclerosis at the capitellum, developing osteonecrosis possible.           Results for orders placed or performed during the hospital encounter of 10/31/16   EKG, 12 LEAD, INITIAL   Result Value Ref Range Ventricular Rate 102 BPM    Atrial Rate 102 BPM    P-R Interval 116 ms    QRS Duration 76 ms    Q-T Interval 368 ms    QTC Calculation (Bezet) 479 ms    Calculated P Axis 50 degrees    Calculated R Axis 44 degrees    Calculated T Axis 43 degrees    Diagnosis       Sinus tachycardia  Possible Left atrial enlargement  Borderline ECG  When compared with ECG of 02-JUL-2016 22:40,  No significant change was found  Confirmed by Debbie Jordan ([id number]) on 11/1/2016 8:00:17 AM             Results from Hospital Encounter encounter on 05/30/13   US RETROPERITONEUM COMP   Narrative PROCEDURE:  Ultrasound Retroperitoneal    CPT code 27927    INDICATION:  Acute renal failure. .    COMPARISON:  Ultrasound right upper quadrant 11/4/11. CT 6/4/10. FINDINGS:  The right kidney measures 11.1 x 4.9 x 5.2 cm. The left kidney  measures 10.8 x 4.1 x 5.2 cm. No evidence of solid or cystic mass,  hydronephrosis or ectopic calcification is detected in either kidney. The  renal parenchymal echogenicity is unremarkable. The bladder is contracted with Renae in place. No free intraperitoneal fluid  is seen. The visualized portions of the liver and spleen are unremarkable. The spleen  measures 11.3 x 4.7 x 11.4 cm. The inferior vena cava and aorta are not well  visualized. Impression IMPRESSION:    Unremarkable ultrasound of the kidneys. Lab Results   Component Value Date/Time    D DIMER 0.28 11/01/2016 04:13 PM          Ms. Sinha has a reminder for a \"due or due soon\" health maintenance. I have asked that she contact her primary care provider for follow-up on this health maintenance. IMPRESSION:   · Persistent COPD exacerbation likely trigger; ? URI/CAP, bacterial vs viral syndrome, continue smoking (1/2 PPD and environmental (cold weather) exposure,  ?sarcoidosis progression and other possible etiology ? Cardiac with chronic LE edema (EF 55 % on last Echo 11/9/77), Systolic pressure was mildly increased.  Estimated peak pressure was 40 mmHg. RECOMMENDATIONS:   · Empiric antibiotic for possible URI/CAPCefpodoxime 200 mg BID X 7 days and Azithromycin 50 mg X 7 day, both scrip send, med's actions and adverse reaction instructed. · Prednisone 40 mg X 5 days, 30 mg X 5 days, 20 mg X5 days, 10 mg X 5 days- script send, pt is diabetic, instructed to monitor glucose closely, s/s hyperglycemia, for glucose elevation and hyperglycemia-call PCP for management. · Continue Albuterol by inhaler/or nebulization every 4 hours for 2-3 days for persistent wheezing, then as needed. If still using too often call the clinic, for severe symptoms go to hospital   · Continue Anoro as px , proper use and inhaler technique reviewed. · Continue O2/NC supplements, keep sats>90%. · Warning signs of respiratory distress were reviewed with the patient   · Discussed avoidance of precipitants/ avoid or limit going out on cold weather. · Absolute smoking cessation instructed, modest strategy encouraged  · Pulmonary toilette, encouraged deep breathing, continue to hydrate, humidifier, well balanced meal, and activity as tolerated. · Records and imaging reviewed, d/w Dr Reshma Paniagua, agree with above plan  · Reviewed all medications and discussed concerns, answered questions. · Follow-up as scheduled with Dr Kailey Mancera month or come back sooner should new symptoms or problems arise.        Roosevelt Lizama NP

## 2018-01-03 NOTE — PATIENT INSTRUCTIONS
Instructions:    Continue Anoro 1 inhalation daily and remember to exhale fully before inhaling    Continue Albuterol 2 inhalations every 4 hours as needed or by nebulizer every 4 hours as needed and if you require albuterol too often to control respiratory symptoms call the office for severe symptoms go to the emergency room    Always call for symptoms such as increasing shortness of breath or a cough productive of discolored mucus     Prednisone, see instruction on bottle  Monitor blood sugar, call PCP for management.     Take Azithromycin after lunch    Take Cefpodoxime Twice daily after breakfast and suffer

## 2018-01-26 ENCOUNTER — OFFICE VISIT (OUTPATIENT)
Dept: PULMONOLOGY | Age: 63
End: 2018-01-26

## 2018-01-26 VITALS
SYSTOLIC BLOOD PRESSURE: 110 MMHG | BODY MASS INDEX: 33.99 KG/M2 | WEIGHT: 204 LBS | DIASTOLIC BLOOD PRESSURE: 76 MMHG | OXYGEN SATURATION: 94 % | HEART RATE: 94 BPM | TEMPERATURE: 98.2 F | HEIGHT: 65 IN | RESPIRATION RATE: 22 BRPM

## 2018-01-26 DIAGNOSIS — J44.9 CHRONIC OBSTRUCTIVE PULMONARY DISEASE, UNSPECIFIED COPD TYPE (HCC): Primary | ICD-10-CM

## 2018-01-26 DIAGNOSIS — F17.200 TOBACCO DEPENDENCE: ICD-10-CM

## 2018-01-26 DIAGNOSIS — J44.9 COPD, SEVERE (HCC): ICD-10-CM

## 2018-01-26 DIAGNOSIS — F19.91 HISTORY OF RECREATIONAL DRUG USE: ICD-10-CM

## 2018-01-26 DIAGNOSIS — N18.31 CKD STAGE G3A/A1, GFR 45-59 AND ALBUMIN CREATININE RATIO <30 MG/G (HCC): Chronic | ICD-10-CM

## 2018-01-26 DIAGNOSIS — D86.9 SARCOIDOSIS: ICD-10-CM

## 2018-01-26 DIAGNOSIS — K21.9 GASTROESOPHAGEAL REFLUX DISEASE WITH HIATAL HERNIA: Chronic | ICD-10-CM

## 2018-01-26 DIAGNOSIS — R09.02 HYPOXEMIA REQUIRING SUPPLEMENTAL OXYGEN: Chronic | ICD-10-CM

## 2018-01-26 DIAGNOSIS — R91.8 INFILTRATE OF LUNG PRESENT ON IMAGING OF CHEST: ICD-10-CM

## 2018-01-26 DIAGNOSIS — Z99.81 HYPOXEMIA REQUIRING SUPPLEMENTAL OXYGEN: Chronic | ICD-10-CM

## 2018-01-26 DIAGNOSIS — I51.89 DIASTOLIC DYSFUNCTION WITHOUT HEART FAILURE: Chronic | ICD-10-CM

## 2018-01-26 DIAGNOSIS — K44.9 GASTROESOPHAGEAL REFLUX DISEASE WITH HIATAL HERNIA: Chronic | ICD-10-CM

## 2018-01-26 DIAGNOSIS — F31.31 BIPOLAR AFFECTIVE DISORDER, CURRENTLY DEPRESSED, MILD (HCC): Chronic | ICD-10-CM

## 2018-01-26 NOTE — MR AVS SNAPSHOT
[address] [facility], [address] [address] 
[phone] Patient: Fatou Pennington MRN: [id number] NDJ:2/18/1843 Visit Information Date & Time Provider Department Dept. Phone Encounter #  
 1/26/2018 10:15 AM MD [facility] [place] [id number] Follow-up Instructions Return in about 4 months (around 5/26/2018). Your Appointments 2/22/2018  9:30 AM  
Office Visit with Justin Silveira MD  
[facility]) Appt Note: [address] [facility], [address] [facility] [address] [address], [address] Upcoming Health Maintenance Date Due  
 FOOT EXAM Q1 9/12/1966 EYE EXAM RETINAL OR DILATED Q1 9/12/1966 DTaP/Tdap/Td series (1 - Tdap) 9/12/1977 FOBT Q 1 YEAR AGE 50-75 9/12/2006 BREAST CANCER SCRN MAMMOGRAM 3/20/2015 PAP AKA CERVICAL CYTOLOGY 8/27/2015 ZOSTER VACCINE AGE 60> 7/12/2016 HEMOGLOBIN A1C Q6M 10/20/2017 LIPID PANEL Q1 11/6/2017 MICROALBUMIN Q1 5/11/2018 Allergies as of 1/26/2018  Review Complete On: 1/26/2018 By: Corinne Clark MD  
  
 Severity Noted Reaction Type Reactions Moxifloxacin  09/24/2015    Rash Pcn [Penicillins]    Swelling Sulfa (Sulfonamide Antibiotics)    Swelling Current Immunizations  Reviewed on 10/25/2017 Name Date Influenza Vaccine 10/2/2017, 10/10/2015, 12/29/2014 Influenza Vaccine Split 10/30/2012 Pneumococcal Polysaccharide (PPSV-23) 7/10/2015 Not reviewed this visit You Were Diagnosed With   
  
 Codes Comments Chronic obstructive pulmonary disease, unspecified COPD type ([facility]    -  Primary ICD-10-CM: J44.9 ICD-9-CM: 021 Tobacco dependence     ICD-10-CM: A53.074 ICD-9-CM: 305.1 History of recreational drug use     ICD-10-CM: H22.966 ICD-9-CM: 304.90   
 CKD stage G3a/A1, GFR 45-59 and albumin creatinine ratio <30 mg/g     ICD-10-CM: N18.3 ICD-9-CM: 778. 3 Gastroesophageal reflux disease with hiatal hernia     ICD-10-CM: K21.9, K44.9 ICD-9-CM: 530.81, 553.3 Sarcoidosis     ICD-10-CM: D86.9 ICD-9-CM: 135 Hypoxemia requiring supplemental oxygen     ICD-10-CM: R09.02, Z99.81 ICD-9-CM: 799.02 Diastolic dysfunction without heart failure     ICD-10-CM: I51.9 ICD-9-CM: 429.9 Bipolar affective disorder, currently depressed, mild (HCC)     ICD-10-CM: F31.31 
ICD-9-CM: 296.51 Infiltrate of lung present on imaging of chest     ICD-10-CM: R91.8 ICD-9-CM: 793.19   
 COPD, severe ([facility]     ICD-10-CM: J44.9 ICD-9-CM: 987 Vitals BP Pulse Temp Resp Height(growth percentile) Weight(growth percentile) 110/76 (BP 1 Location: Left arm, BP Patient Position: At rest) 94 98.2 °F (36.8 °C) (Oral) 22 5' 5\" (1.651 m) 204 lb (92.5 kg) LMP SpO2 BMI OB Status Smoking Status 11/25/2012 94% 33.95 kg/m2 Postmenopausal Current Every Day Smoker BMI and BSA Data Body Mass Index Body Surface Area 33.95 kg/m 2 2.06 m 2 Preferred Pharmacy Pharmacy Name [facility] [address] [phone] Your Updated Medication List  
  
   
This list is accurate as of: 1/26/18 10:46 AM.  Always use your most recent med list.  
  
  
  
  
 ABILIFY 5 mg tablet Generic drug:  ARIPiprazole Take 10 mg by mouth daily. Indications: BIPOLAR DISORDER IN REMISSION  
  
 * albuterol 2.5 mg /3 mL (0.083 %) nebulizer solution Commonly known as:  PROVENTIL VENTOLIN  
USE 1 NEB EVERY 4 HOURS FOR WHEEZING AND SHORTNESS OF BREATH  Indications: CHRONIC OBSTRUCTIVE PULMONARY DISEASE * albuterol 90 mcg/actuation inhaler Commonly known as:  PROVENTIL HFA Take 2 Puffs by inhalation every four (4) hours as needed for Wheezing. allopurinol 100 mg tablet Commonly known as:  Darrick Seymour  
 Take  by mouth daily. ANORO ELLIPTA 62.5-25 mcg/actuation inhaler Generic drug:  umeclidinium-vilanterol INHALE 1 PUFF DAILY  
  
 aspirin 81 mg chewable tablet Take 1 Tab by mouth daily (with breakfast). Indications: prevention of cerebrovascular accident  
  
 azithromycin 500 mg Tab Commonly known as:  Smitha Allyson Take 1 Tab by mouth daily. Indications: uri/CAP cholecalciferol 1,000 unit tablet Commonly known as:  VITAMIN D3 Take 2 Tabs by mouth daily. Indications: PREVENTION OF VITAMIN D DEFICIENCY  
  
 DEPAKOTE 250 mg tablet Generic drug:  divalproex DR Take 250 mg by mouth nightly. Indications: BIPOLAR DISORDER IN REMISSION  
  
 famotidine 20 mg tablet Commonly known as:  PEPCID Take 1 Tab by mouth daily. Indications: PREVENTION OF STRESS ULCER  
  
 insulin glargine 100 unit/mL injection Commonly known as:  LANTUS Inject 70 units subcutaneously before breakfast (7AM) and 40 units subcutaneously after dinner (7PM). Indications: type 2 diabetes mellitus  
  
 insulin lispro 100 unit/mL injection Commonly known as:  HUMALOG To be given 15 min before meals: < 150 - None, 151-200 - 2 u, 201-250 - 4 u, 251-300 - 6 u, 301-350 - 8 u, 351-400 - 10 u, >400 - 12 u  
  
 insulin syringe,safetyneedle 1 mL 30 gauge x 5/16\" Syrg As directed  
  
 levothyroxine 100 mcg tablet Commonly known as:  SYNTHROID Take 1 Tab by mouth Daily (before breakfast). Indications: hypothyroidism METANX 2.8-2-25 mg Tab Generic drug:  l-methylfolate-vit b12-vit b6 Take 1 Tab by mouth daily. metOLazone 2.5 mg tablet Commonly known as:  Agustin Pi Take  by mouth daily. Nebulizer Accessories Kit Use with nebulizer machine NEURONTIN 300 mg capsule Generic drug:  gabapentin Take 300 mg by mouth three (3) times daily. Indications: NEUROPATHIC PAIN  
  
 oxyCODONE-acetaminophen 5-325 mg per tablet Commonly known as:  PERCOCET  
 Take 1 Tab by mouth every six (6) hours as needed for Pain. Max Daily Amount: 4 Tabs. Indications: Pain Oxygen-Air Delivery Systems  
by Nasal route continuous. 2 LPM via NC  Indications: Hypoxemia requiring supplementary oxygen PLAVIX 75 mg Tab Generic drug:  clopidogrel Take  by mouth.  
  
 predniSONE 10 mg tablet Commonly known as:  DELTASONE  
40 mg x 5 days 30 mg X 5 days 20 mg X 5 days 10 mg x 5 days  Indications: COPD exacerbation  
  
 rosuvastatin 5 mg tablet Commonly known as:  CRESTOR Take 1 Tab by mouth nightly. tolterodine 1 mg tablet Commonly known as:  Kathleen Jurist Take 1 Tab by mouth two (2) times a day. Indications: URINARY URGE INCONTINENCE  
  
 traMADol 50 mg tablet Commonly known as:  ULTRAM  
  
 traZODone 100 mg tablet Commonly known as:  Lunette Grate Take 100 mg by mouth nightly. Indications: major depressive disorder TYLENOL 325 mg tablet Generic drug:  acetaminophen Take 325 mg by mouth every six (6) hours as needed for Pain. Indications: Fever, Pain ZOLOFT 100 mg tablet Generic drug:  sertraline Take 50 mg by mouth daily. Indications: DEPRESSION ASSOCIATED WITH BIPOLAR DISORDER * Notice: This list has 2 medication(s) that are the same as other medications prescribed for you. Read the directions carefully, and ask your doctor or other care provider to review them with you. We Performed the Following AMB POC PFT COMPLETE W/BRONCHODILATOR [96440 CPT(R)] Follow-up Instructions Return in about 4 months (around 5/26/2018). To-Do List   
 01/26/2018 Imaging:  XR CHEST PA LAT   
  
 01/29/2018 Imaging:  CT CHEST WO CONT Introducing [facility]! Dear Isatu Miranda: Thank you for requesting a [employer] account. Our records indicate that you have previously registered for a [employer] account but its currently inactive. Please call our [employer] support line at [phone]. Additional Information If you have questions, please visit the Frequently Asked Questions section of the [employer] website at [email or web address] Remember, [employer] is NOT to be used for urgent needs. For medical emergencies, dial 911. Now available from your iPhone and Android! Please provide this summary of care documentation to your next provider. Your primary care clinician is listed as Ana Paula Cadena. If you have any questions after today's visit, please call [phone].

## 2018-01-26 NOTE — PROGRESS NOTES
HISTORY OF PRESENT ILLNESS  Hemant Ford is a 65 y.o. female. HPI Comments: History of Sarcoidosis and COPD, last FEV 1 in 2012 was 51%. Last seen while pt was in exacerbation requiring increased Prednisone dose and antibiotics with good response back to baseline level of dyspnea. She has been off Prednisone but admits to smoking 1 ppd. Uses O2 despite smoking. Pt reports less tripping and falling over her O2 tubing. Pt with history of CKD but has admitted poor compliance with nephrology visits. Shortness of Breath   The history is provided by the patient. This is a chronic problem. The problem occurs frequently. Episode onset: years. The problem has been gradually improving. Associated symptoms include cough, sputum production ( whitish) and wheezing. Pertinent negatives include no fever, no headaches, no sore throat, no ear pain, no neck pain, no hemoptysis, no PND, no chest pain, no vomiting, no abdominal pain, no rash, no leg pain and no claudication. Orthopnea: mild  Leg swelling: chronic. Treatments tried: Anoro and Albuterol prn. The treatment provided moderate relief. Cough with sputum   Associated symptoms include shortness of breath. Pertinent negatives include no chest pain, no abdominal pain and no headaches. Review of Systems   Constitutional: Positive for malaise/fatigue. Negative for chills, diaphoresis, fever and weight loss. HENT: Negative for congestion, ear discharge, ear pain, hearing loss, nosebleeds, sinus pain, sore throat and tinnitus. Eyes: Negative for blurred vision, double vision, photophobia, pain, discharge and redness. Respiratory: Positive for cough, sputum production ( whitish), shortness of breath and wheezing. Negative for hemoptysis and stridor. Cardiovascular: Negative for chest pain, palpitations, claudication and PND. Orthopnea: mild  Leg swelling: chronic.    Gastrointestinal: Negative for abdominal pain, blood in stool, constipation, diarrhea, heartburn, melena, nausea and vomiting. Genitourinary: Negative for dysuria, flank pain, frequency, hematuria and urgency. Musculoskeletal: Positive for back pain and joint pain. Negative for falls, myalgias and neck pain. Skin: Negative for itching and rash. Neurological: Negative for dizziness, tingling, tremors, sensory change, speech change, focal weakness, seizures, loss of consciousness, weakness and headaches. Endo/Heme/Allergies: Negative for environmental allergies and polydipsia. Does not bruise/bleed easily. Psychiatric/Behavioral: Positive for depression. Negative for hallucinations, memory loss, substance abuse and suicidal ideas. The patient is nervous/anxious. The patient does not have insomnia.       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 congestive heart failure     Better controlled     Bipolar affective disorder ([facility] 12/5/2012    Cellulitis of right forearm 5/4/2017    Chronic back pain     Chronic obstructive pulmonary disease (COPD) (HCC)     SOB, on paula O2 Recent admission with psychosis     CKD stage G3a/A1, GFR 45-59 and albumin creatinine ratio <30 mg/g 5/11/2017    Urine microalbumin/Creatinine ratio (5/11/2017) = 9 mg/g    Contusion of left elbow 5/4/2017    Depression     Diabetic neuropathy associated with type 2 diabetes mellitus (HCC)     Diastolic dysfunction without heart failure     Stable on diuretics     Falls frequently     Gastroesophageal reflux disease with hiatal hernia     Generalized osteoarthritis of multiple sites     History of acute renal failure 5/31/2013    History of back injury     jumped out of second story window     History of hepatitis C     treated    History of penicillin allergy     History of vitamin D deficiency 5/10/2017    Hyperuricemia 5/26/2017    Hypothyroidism     Hypoxemia requiring supplemental oxygen 12/29/2014    Intravenous drug user 5/2/2017    Memory difficulty     Mixed connective tissue disease ([facility] 5/10/2017    Obesity, Class I, BMI 30-34.9     Olecranon bursitis of right elbow 5/4/2017    Recurrent genital herpes 5/31/2013    Right Achilles tendinitis 5/2/2017    Sarcoidosis     Sickle cell trait ([facility])     Type 2 diabetes with stage 3 chronic kidney disease GFR 30-59 ([facility])     Urge urinary incontinence 5/10/2017    Venous insufficiency     Wears glasses      Current Outpatient Prescriptions on File Prior to Visit   Medication Sig Dispense Refill    azithromycin (ZITHROMAX) 500 mg tab Take 1 Tab by mouth daily. Indications: uri/CAP 7 Tab 0    predniSONE (DELTASONE) 10 mg tablet 40 mg x 5 days 30 mg X 5 days 20 mg X 5 days 10 mg x 5 days  Indications: COPD exacerbation 50 Tab 0    metOLazone (ZAROXOLYN) 2.5 mg tablet Take  by mouth daily.  albuterol (PROVENTIL HFA) 90 mcg/actuation inhaler Take 2 Puffs by inhalation every four (4) hours as needed for Wheezing. 1 Inhaler 3    allopurinol (ZYLOPRIM) 100 mg tablet Take  by mouth daily.  ANORO ELLIPTA 62.5-25 mcg/actuation inhaler INHALE 1 PUFF DAILY 1 Inhaler 5    clopidogrel (PLAVIX) 75 mg tab Take  by mouth.  traMADol (ULTRAM) 50 mg tablet       famotidine (PEPCID) 20 mg tablet Take 1 Tab by mouth daily. Indications: PREVENTION OF STRESS ULCER 15 Tab 0    insulin glargine (LANTUS) 100 unit/mL injection Inject 70 units subcutaneously before breakfast (7AM) and 40 units subcutaneously after dinner (7PM).   Indications: type 2 diabetes mellitus 1 Vial 0    insulin lispro (HUMALOG) 100 unit/mL injection To be given 15 min before meals: < 150 - None, 151-200 - 2 u, 201-250 - 4 u, 251-300 - 6 u, 301-350 - 8 u, 351-400 - 10 u, >400 - 12 u 1 Vial 0    oxyCODONE-acetaminophen (PERCOCET) 5-325 mg per tablet Take 1 Tab by mouth every six (6) hours as needed for Pain. Max Daily Amount: 4 Tabs. Indications: Pain 15 Tab 0    aspirin 81 mg chewable tablet Take 1 Tab by mouth daily (with breakfast). Indications: prevention of cerebrovascular accident 13 Tab 0    levothyroxine (SYNTHROID) 100 mcg tablet Take 1 Tab by mouth Daily (before breakfast). Indications: hypothyroidism 15 Tab 0    tolterodine (DETROL) 1 mg tablet Take 1 Tab by mouth two (2) times a day. Indications: URINARY URGE INCONTINENCE 30 Tab 0    cholecalciferol (VITAMIN D3) 1,000 unit tablet Take 2 Tabs by mouth daily. Indications: PREVENTION OF VITAMIN D DEFICIENCY 30 Tab 0    divalproex DR (DEPAKOTE) 250 mg tablet Take 250 mg by mouth nightly. Indications: BIPOLAR DISORDER IN REMISSION      insulin syringe,safetyneedle 1 mL 30 gauge x 5/16\" syrg As directed 50 Each 0    ARIPiprazole (ABILIFY) 5 mg tablet Take 10 mg by mouth daily. Indications: BIPOLAR DISORDER IN REMISSION      albuterol (PROVENTIL VENTOLIN) 2.5 mg /3 mL (0.083 %) nebulizer solution USE 1 NEB EVERY 4 HOURS FOR WHEEZING AND SHORTNESS OF BREATH  Indications: CHRONIC OBSTRUCTIVE PULMONARY DISEASE 2 Package 3    gabapentin (NEURONTIN) 300 mg capsule Take 300 mg by mouth three (3) times daily. Indications: NEUROPATHIC PAIN      acetaminophen (TYLENOL) 325 mg tablet Take 325 mg by mouth every six (6) hours as needed for Pain. Indications: Fever, Pain      traZODone (DESYREL) 100 mg tablet Take 100 mg by mouth nightly. Indications: major depressive disorder      rosuvastatin (CRESTOR) 5 mg tablet Take 1 Tab by mouth nightly. 30 Tab 6    l-methylfolate-vit b12-vit b6 (METANX) 2.8-2-25 mg Tab Take 1 Tab by mouth daily.  Nebulizer Accessories Kit Use with nebulizer machine 1 Kit prn    sertraline (ZOLOFT) 100 mg tablet Take 50 mg by mouth daily.  Indications: DEPRESSION ASSOCIATED WITH BIPOLAR DISORDER      Oxygen-Air Delivery Systems by Nasal route continuous. 2 LPM via NC  Indications: Hypoxemia requiring supplementary oxygen       No current facility-administered medications on file prior to visit. Allergies   Allergen Reactions    Moxifloxacin Rash    Pcn [Penicillins] Swelling    Sulfa (Sulfonamide Antibiotics) Swelling     Social History     Social History    Marital status: SINGLE     Spouse name: N/A    Number of children: N/A    Years of education: N/A     Occupational History    Not employed Not Employed     Social History Main Topics    Smoking status: Current Every Day Smoker     Packs/day: 0.50     Years: 30.00     Types: Cigarettes     Start date: 12/2/1969    Smokeless tobacco: Never Used    Alcohol use No    Drug use: No      Comment: +Cocaine Screen 2013    Sexual activity: Not on file      Comment: 2014     Other Topics Concern    Not on file     Social History Narrative    Lives with 15year old son. Blood pressure 110/76, pulse 94, temperature 98.2 °F (36.8 °C), temperature source Oral, resp. rate 22, height 5' 5\" (1.651 m), weight 92.5 kg (204 lb), last menstrual period 11/25/2012, SpO2 94 %. Physical Exam   Constitutional: She is oriented to person, place, and time. She appears well-developed and well-nourished. No distress. HENT:   Head: Normocephalic. Nose: Nose normal.   Eyes: Conjunctivae are normal. Pupils are equal, round, and reactive to light. Right eye exhibits no discharge. Left eye exhibits no discharge. No scleral icterus. Neck: No JVD present. No tracheal deviation present. No thyromegaly present. Cardiovascular: Normal rate, regular rhythm and intact distal pulses. Exam reveals no gallop. No murmur heard. Pulmonary/Chest: Effort normal. No stridor. No respiratory distress. Wheezes: faint right greater than left upper lung fields  She has no rales. She exhibits no tenderness. Distant breath sounds   Abdominal: Soft. She exhibits no mass. There is no tenderness.    Musculoskeletal: She exhibits no tenderness. Edema: trace    Lymphadenopathy:     She has no cervical adenopathy. Neurological: She is alert and oriented to person, place, and time. Skin: Skin is warm and dry. No rash noted. She is not diaphoretic. No erythema. Psychiatric: She has a normal mood and affect. Her behavior is normal. Judgment and thought content normal.       CXR ANDRE POWER personal review: RLL infiltrate increased persists, hyperinflation    ASSESSMENT and PLAN  Encounter Diagnoses   Name Primary?  Chronic obstructive pulmonary disease, unspecified COPD type ([facility] Yes    Tobacco dependence     History of recreational drug use     CKD stage G3a/A1, GFR 45-59 and albumin creatinine ratio <30 mg/g     Gastroesophageal reflux disease with hiatal hernia     Sarcoidosis     Hypoxemia requiring supplemental oxygen     Diastolic dysfunction without heart failure     Bipolar affective disorder, currently depressed, mild (HCC)     Infiltrate of lung present on imaging of chest     COPD, severe (HCC)        Continue current medications including Anoro. Titrate FiO2 to saturation greater than 90%. encouraged   Strongly counseled on need for smoking cessation. Persistent RLL opacity over 2 months worrisome for neoplasm, fly with smoking history. Schedule CT wo contrast, will call pt with results. Reviewed CXR results with pt.   RTC 4 months

## 2018-02-06 ENCOUNTER — HOSPITAL ENCOUNTER (OUTPATIENT)
Dept: CT IMAGING | Age: 63
Discharge: HOME | End: 2018-02-06
Attending: INTERNAL MEDICINE
Payer: MEDICAID

## 2018-02-06 DIAGNOSIS — D86.9 SARCOIDOSIS: ICD-10-CM

## 2018-02-06 DIAGNOSIS — F17.200 TOBACCO DEPENDENCE: ICD-10-CM

## 2018-02-06 DIAGNOSIS — R91.8 INFILTRATE OF LUNG PRESENT ON IMAGING OF CHEST: ICD-10-CM

## 2018-02-06 PROCEDURE — 71250 CT THORAX DX C-: CPT

## 2018-02-08 ENCOUNTER — HOSPITAL ENCOUNTER (OUTPATIENT)
Dept: LAB | Age: 63
Discharge: HOME | End: 2018-02-08

## 2018-02-08 PROCEDURE — 99001 SPECIMEN HANDLING PT-LAB: CPT | Performed by: INTERNAL MEDICINE

## 2018-02-19 ENCOUNTER — HOSPITAL ENCOUNTER (OUTPATIENT)
Dept: LAB | Age: 63
Discharge: HOME | End: 2018-02-19

## 2018-02-19 PROCEDURE — 99001 SPECIMEN HANDLING PT-LAB: CPT | Performed by: INTERNAL MEDICINE

## 2018-02-20 LAB
ALBUMIN SERPL-MCNC: 4.1 G/DL (ref 3.6–4.8)
ALP SERPL-CCNC: 79 IU/L (ref 39–117)
ALT SERPL-CCNC: 14 IU/L (ref 0–32)
AST SERPL-CCNC: 21 IU/L (ref 0–40)
BILIRUB DIRECT SERPL-MCNC: 0.15 MG/DL (ref 0–0.4)
BILIRUB SERPL-MCNC: 0.5 MG/DL (ref 0–1.2)
CHOLEST SERPL-MCNC: 121 MG/DL (ref 100–199)
HDLC SERPL-MCNC: 29 MG/DL
LDLC SERPL CALC-MCNC: 49 MG/DL (ref 0–99)
PROT SERPL-MCNC: 7.7 G/DL (ref 6–8.5)
TRIGL SERPL-MCNC: 217 MG/DL (ref 0–149)
VLDLC SERPL CALC-MCNC: 43 MG/DL (ref 5–40)

## 2018-02-22 ENCOUNTER — OFFICE VISIT (OUTPATIENT)
Dept: CARDIOLOGY CLINIC | Age: 63
End: 2018-02-22

## 2018-02-22 VITALS
DIASTOLIC BLOOD PRESSURE: 54 MMHG | HEIGHT: 65 IN | SYSTOLIC BLOOD PRESSURE: 84 MMHG | WEIGHT: 199 LBS | BODY MASS INDEX: 33.15 KG/M2 | HEART RATE: 95 BPM

## 2018-02-22 DIAGNOSIS — I10 ESSENTIAL HYPERTENSION: ICD-10-CM

## 2018-02-22 DIAGNOSIS — I50.32 DIASTOLIC CHF, CHRONIC (HCC): Primary | ICD-10-CM

## 2018-02-22 DIAGNOSIS — E78.5 HYPERLIPIDEMIA, UNSPECIFIED HYPERLIPIDEMIA TYPE: ICD-10-CM

## 2018-02-22 DIAGNOSIS — Z79.4 TYPE 2 DIABETES MELLITUS WITH STAGE 3 CHRONIC KIDNEY DISEASE, WITH LONG-TERM CURRENT USE OF INSULIN (HCC): Chronic | ICD-10-CM

## 2018-02-22 DIAGNOSIS — E11.22 TYPE 2 DIABETES MELLITUS WITH STAGE 3 CHRONIC KIDNEY DISEASE, WITH LONG-TERM CURRENT USE OF INSULIN (HCC): Chronic | ICD-10-CM

## 2018-02-22 DIAGNOSIS — J42 CHRONIC BRONCHITIS, UNSPECIFIED CHRONIC BRONCHITIS TYPE (HCC): Chronic | ICD-10-CM

## 2018-02-22 DIAGNOSIS — N18.30 TYPE 2 DIABETES MELLITUS WITH STAGE 3 CHRONIC KIDNEY DISEASE, WITH LONG-TERM CURRENT USE OF INSULIN (HCC): Chronic | ICD-10-CM

## 2018-02-22 RX ORDER — ROSUVASTATIN CALCIUM 5 MG/1
5 TABLET, COATED ORAL
Qty: 90 TAB | Refills: 1 | Status: SHIPPED | OUTPATIENT
Start: 2018-02-22 | End: 2018-03-28 | Stop reason: SDUPTHER

## 2018-02-22 RX ORDER — OXYBUTYNIN CHLORIDE 5 MG/1
5 TABLET ORAL 2 TIMES DAILY
COMMUNITY
End: 2020-01-01

## 2018-02-22 NOTE — PROGRESS NOTES
HISTORY OF PRESENT ILLNESS  Kd Pabon is a 65 y.o. female. Cholesterol Problem   The history is provided by the patient. This is a chronic problem. The problem occurs constantly. The problem has not changed since onset. Associated symptoms include shortness of breath. Pertinent negatives include no chest pain. CHF   The history is provided by the patient. This is a chronic problem. The problem occurs constantly. The problem has not changed since onset. Associated symptoms include shortness of breath. Pertinent negatives include no chest pain. The symptoms are aggravated by exertion. The symptoms are relieved by rest.   Hypertension   The history is provided by the patient. This is a chronic problem. The problem occurs constantly. The problem has not changed since onset. Associated symptoms include shortness of breath. Pertinent negatives include no chest pain. Nothing aggravates the symptoms. Shortness of Breath   The history is provided by the patient. This is a recurrent problem. The problem occurs frequently. The problem has been gradually improving. Associated symptoms include leg swelling. Pertinent negatives include no fever, no cough, no sputum production, no hemoptysis, no wheezing, no PND, no orthopnea, no chest pain, no vomiting, no rash and no claudication. Associated medical issues include COPD and heart failure. Leg Swelling   The history is provided by the patient. This is a recurrent problem. The problem occurs every several days. Associated symptoms include shortness of breath. Pertinent negatives include no chest pain.      Family History   Problem Relation Age of Onset    Seizures Other     Diabetes Mother     Hypertension Mother     Heart Disease Mother     Cancer Mother     Diabetes Father     Stroke Father     Heart Disease Father     Headache Sister     Depression Neg Hx     Suicide Neg Hx     Psychotic Disorder Neg Hx     Substance Abuse Neg Hx     Dementia Neg Hx Past Medical History:   Diagnosis Date    Abnormally low high density lipoprotein (HDL) cholesterol with hypertriglyceridemia     Lipid profile (2016) showed , , HDL 38, LDL 37    Anxiety     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 congestive heart failure     Better controlled     Bipolar affective disorder ([address] 2012    Cellulitis of right forearm 2017    Chronic back pain     Chronic obstructive pulmonary disease (COPD) (HCC)     SOB, on paula O2 Recent admission with psychosis     CKD stage G3a/A1, GFR 45-59 and albumin creatinine ratio <30 mg/g 2017    Urine microalbumin/Creatinine ratio (2017) = 9 mg/g    Contusion of left elbow 2017    Depression     Diabetic neuropathy associated with type 2 diabetes mellitus (HCC)     Diastolic dysfunction without heart failure     Stable on diuretics     Falls frequently     Gastroesophageal reflux disease with hiatal hernia     Generalized osteoarthritis of multiple sites     History of acute renal failure 2013    History of back injury     jumped out of second story window     History of hepatitis C     treated    History of penicillin allergy     History of vitamin D deficiency 5/10/2017    Hyperuricemia 2017    Hypothyroidism     Hypoxemia requiring supplemental oxygen 2014    Intravenous drug user 2017    Memory difficulty     Mixed connective tissue disease ([address] 5/10/2017    Obesity, Class I, BMI 30-34.9     Olecranon bursitis of right elbow 2017    Recurrent genital herpes 2013    Right Achilles tendinitis 2017    Sarcoidosis     Sickle cell trait (HCC)     Type 2 diabetes with stage 3 chronic kidney disease GFR 30-59 ([address]     Urge urinary incontinence 5/10/2017    Venous insufficiency     Wears glasses        Past Surgical History:   Procedure Laterality Date    HX BACK SURGERY      HX  SECTION      HX CYST REMOVAL Left 1979    Left foot    HX OTHER SURGICAL Left 04/17/2017    S/P incision and drainage of the abscess of the left hand and the left foot (4/17/2017 - Dr. Arthur Ochoa. [address])    HX TUBAL LIGATION         Allergies   Allergen Reactions    Moxifloxacin Rash    Pcn [Penicillins] Swelling    Sulfa (Sulfonamide Antibiotics) Swelling       Current Outpatient Prescriptions   Medication Sig    oxybutynin (DITROPAN) 5 mg tablet Take 5 mg by mouth three (3) times daily.  metOLazone (ZAROXOLYN) 2.5 mg tablet Take  by mouth daily.  albuterol (PROVENTIL HFA) 90 mcg/actuation inhaler Take 2 Puffs by inhalation every four (4) hours as needed for Wheezing.  allopurinol (ZYLOPRIM) 100 mg tablet Take  by mouth daily.  ANORO ELLIPTA 62.5-25 mcg/actuation inhaler INHALE 1 PUFF DAILY    clopidogrel (PLAVIX) 75 mg tab Take  by mouth.  traMADol (ULTRAM) 50 mg tablet     famotidine (PEPCID) 20 mg tablet Take 1 Tab by mouth daily. Indications: PREVENTION OF STRESS ULCER    insulin glargine (LANTUS) 100 unit/mL injection Inject 70 units subcutaneously before breakfast (7AM) and 40 units subcutaneously after dinner (7PM). Indications: type 2 diabetes mellitus    insulin lispro (HUMALOG) 100 unit/mL injection To be given 15 min before meals: < 150 - None, 151-200 - 2 u, 201-250 - 4 u, 251-300 - 6 u, 301-350 - 8 u, 351-400 - 10 u, >400 - 12 u    aspirin 81 mg chewable tablet Take 1 Tab by mouth daily (with breakfast). Indications: prevention of cerebrovascular accident    levothyroxine (SYNTHROID) 100 mcg tablet Take 1 Tab by mouth Daily (before breakfast). Indications: hypothyroidism    cholecalciferol (VITAMIN D3) 1,000 unit tablet Take 2 Tabs by mouth daily. Indications: PREVENTION OF VITAMIN D DEFICIENCY    divalproex DR (DEPAKOTE) 250 mg tablet Take 250 mg by mouth nightly.  Indications: BIPOLAR DISORDER IN REMISSION    insulin syringe,safetyneedle 1 mL 30 gauge x 5/16\" syrg As directed    ARIPiprazole (ABILIFY) 5 mg tablet Take 10 mg by mouth daily. Indications: BIPOLAR DISORDER IN REMISSION    albuterol (PROVENTIL VENTOLIN) 2.5 mg /3 mL (0.083 %) nebulizer solution USE 1 NEB EVERY 4 HOURS FOR WHEEZING AND SHORTNESS OF BREATH  Indications: CHRONIC OBSTRUCTIVE PULMONARY DISEASE    gabapentin (NEURONTIN) 300 mg capsule Take 300 mg by mouth three (3) times daily. Indications: NEUROPATHIC PAIN    acetaminophen (TYLENOL) 325 mg tablet Take 325 mg by mouth every six (6) hours as needed for Pain. Indications: Fever, Pain    traZODone (DESYREL) 100 mg tablet Take 100 mg by mouth nightly. Indications: major depressive disorder    rosuvastatin (CRESTOR) 5 mg tablet Take 1 Tab by mouth nightly.  Nebulizer Accessories Kit Use with nebulizer machine    sertraline (ZOLOFT) 100 mg tablet Take 50 mg by mouth daily. Indications: DEPRESSION ASSOCIATED WITH BIPOLAR DISORDER    Oxygen-Air Delivery Systems by Nasal route continuous. 2 LPM via NC  Indications: Hypoxemia requiring supplementary oxygen    oxyCODONE-acetaminophen (PERCOCET) 5-325 mg per tablet Take 1 Tab by mouth every six (6) hours as needed for Pain. Max Daily Amount: 4 Tabs. Indications: Pain     No current facility-administered medications for this visit. Visit Vitals    BP (!) 84/54    Pulse 95    Ht 5' 5\" (1.651 m)    Wt 90.3 kg (199 lb)    LMP 11/25/2012 (Exact Date)    BMI 33.12 kg/m2       Review of Systems   Constitutional: Negative for chills and fever. HENT: Negative for nosebleeds. Eyes: Negative for blurred vision and double vision. Respiratory: Positive for shortness of breath. Negative for cough, hemoptysis, sputum production and wheezing. Cardiovascular: Positive for leg swelling. Negative for chest pain, palpitations, orthopnea, claudication and PND. Gastrointestinal: Negative for heartburn, nausea and vomiting. Musculoskeletal: Negative for myalgias. Skin: Negative for rash. Neurological: Negative for dizziness and weakness. Endo/Heme/Allergies: Does not bruise/bleed easily.      Family History   Problem Relation Age of Onset    Seizures Other     Diabetes Mother     Hypertension Mother     Heart Disease Mother     Cancer Mother     Diabetes Father     Stroke Father     Heart Disease Father     Headache Sister     Depression Neg Hx     Suicide Neg Hx     Psychotic Disorder Neg Hx     Substance Abuse Neg Hx     Dementia Neg Hx        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 congestive heart failure     Better controlled     Bipolar affective disorder ([facility] 12/5/2012    Cellulitis of right forearm 5/4/2017    Chronic back pain     Chronic obstructive pulmonary disease (COPD) (HCC)     SOB, on paula O2 Recent admission with psychosis     CKD stage G3a/A1, GFR 45-59 and albumin creatinine ratio <30 mg/g 5/11/2017    Urine microalbumin/Creatinine ratio (5/11/2017) = 9 mg/g    Contusion of left elbow 5/4/2017    Depression     Diabetic neuropathy associated with type 2 diabetes mellitus (HCC)     Diastolic dysfunction without heart failure     Stable on diuretics     Falls frequently     Gastroesophageal reflux disease with hiatal hernia     Generalized osteoarthritis of multiple sites     History of acute renal failure 5/31/2013    History of back injury     jumped out of second story window     History of hepatitis C     treated    History of penicillin allergy     History of vitamin D deficiency 5/10/2017    Hyperuricemia 5/26/2017    Hypothyroidism     Hypoxemia requiring supplemental oxygen 12/29/2014    Intravenous drug user 5/2/2017    Memory difficulty     Mixed connective tissue disease ([address] 5/10/2017    Obesity, Class I, BMI 30-34.9     Olecranon bursitis of right elbow 2017    Recurrent genital herpes 2013    Right Achilles tendinitis 2017    Sarcoidosis     Sickle cell trait ([facility])     Type 2 diabetes with stage 3 chronic kidney disease GFR 30-59 ([facility])     Urge urinary incontinence 5/10/2017    Venous insufficiency     Wears glasses        Past Surgical History:   Procedure Laterality Date    HX BACK SURGERY      HX  SECTION      HX CYST REMOVAL Left 1979    Left foot    HX OTHER SURGICAL Left 2017    S/P incision and drainage of the abscess of the left hand and the left foot (2017 - Dr. Margarito Copeland. [facility])    HX TUBAL LIGATION         Allergies   Allergen Reactions    Moxifloxacin Rash    Pcn [Penicillins] Swelling    Sulfa (Sulfonamide Antibiotics) Swelling       Current Outpatient Prescriptions   Medication Sig    oxybutynin (DITROPAN) 5 mg tablet Take 5 mg by mouth three (3) times daily.  metOLazone (ZAROXOLYN) 2.5 mg tablet Take  by mouth daily.  albuterol (PROVENTIL HFA) 90 mcg/actuation inhaler Take 2 Puffs by inhalation every four (4) hours as needed for Wheezing.  allopurinol (ZYLOPRIM) 100 mg tablet Take  by mouth daily.  ANORO ELLIPTA 62.5-25 mcg/actuation inhaler INHALE 1 PUFF DAILY    clopidogrel (PLAVIX) 75 mg tab Take  by mouth.  traMADol (ULTRAM) 50 mg tablet     famotidine (PEPCID) 20 mg tablet Take 1 Tab by mouth daily. Indications: PREVENTION OF STRESS ULCER    insulin glargine (LANTUS) 100 unit/mL injection Inject 70 units subcutaneously before breakfast (7AM) and 40 units subcutaneously after dinner (7PM). Indications: type 2 diabetes mellitus    insulin lispro (HUMALOG) 100 unit/mL injection To be given 15 min before meals: < 150 - None, 151-200 - 2 u, 201-250 - 4 u, 251-300 - 6 u, 301-350 - 8 u, 351-400 - 10 u, >400 - 12 u    aspirin 81 mg chewable tablet Take 1 Tab by mouth daily (with breakfast). Indications: prevention of cerebrovascular accident    levothyroxine (SYNTHROID) 100 mcg tablet Take 1 Tab by mouth Daily (before breakfast). Indications: hypothyroidism    cholecalciferol (VITAMIN D3) 1,000 unit tablet Take 2 Tabs by mouth daily. Indications: PREVENTION OF VITAMIN D DEFICIENCY    divalproex DR (DEPAKOTE) 250 mg tablet Take 250 mg by mouth nightly. Indications: BIPOLAR DISORDER IN REMISSION    insulin syringe,safetyneedle 1 mL 30 gauge x 5/16\" syrg As directed    ARIPiprazole (ABILIFY) 5 mg tablet Take 10 mg by mouth daily. Indications: BIPOLAR DISORDER IN REMISSION    albuterol (PROVENTIL VENTOLIN) 2.5 mg /3 mL (0.083 %) nebulizer solution USE 1 NEB EVERY 4 HOURS FOR WHEEZING AND SHORTNESS OF BREATH  Indications: CHRONIC OBSTRUCTIVE PULMONARY DISEASE    gabapentin (NEURONTIN) 300 mg capsule Take 300 mg by mouth three (3) times daily. Indications: NEUROPATHIC PAIN    acetaminophen (TYLENOL) 325 mg tablet Take 325 mg by mouth every six (6) hours as needed for Pain. Indications: Fever, Pain    traZODone (DESYREL) 100 mg tablet Take 100 mg by mouth nightly. Indications: major depressive disorder    rosuvastatin (CRESTOR) 5 mg tablet Take 1 Tab by mouth nightly.  Nebulizer Accessories Kit Use with nebulizer machine    sertraline (ZOLOFT) 100 mg tablet Take 50 mg by mouth daily. Indications: DEPRESSION ASSOCIATED WITH BIPOLAR DISORDER    Oxygen-Air Delivery Systems by Nasal route continuous. 2 LPM via NC  Indications: Hypoxemia requiring supplementary oxygen    oxyCODONE-acetaminophen (PERCOCET) 5-325 mg per tablet Take 1 Tab by mouth every six (6) hours as needed for Pain. Max Daily Amount: 4 Tabs. Indications: Pain     No current facility-administered medications for this visit.         Visit Vitals    BP (!) 84/54    Pulse 95    Ht 5' 5\" (1.651 m)    Wt 90.3 kg (199 lb)    LMP 11/25/2012 (Exact Date)    BMI 33.12 kg/m2         Physical Exam   Constitutional: She is oriented to person, place, and time. She appears well-developed and well-nourished. HENT:   Head: Normocephalic and atraumatic. Eyes: Conjunctivae are normal.   Neck: Neck supple. No JVD present. No tracheal deviation present. No thyromegaly present. Cardiovascular: Normal rate and regular rhythm. PMI is not displaced. Exam reveals no gallop, no S3 and no decreased pulses. No murmur heard. Pulmonary/Chest: No respiratory distress. She has decreased breath sounds. She has wheezes. She has no rales. She exhibits no tenderness. Abdominal: Soft. There is no tenderness. Musculoskeletal: She exhibits no edema (mild edema). Neurological: She is alert and oriented to person, place, and time. Skin: Skin is warm. Psychiatric: She has a normal mood and affect. CARDIOLOGY STUDIES 10/1/2012 10/1/2012 12/1/2009 12/1/2009 11/1/2009 11/1/2009   EKG Result WNL WNL - - - -   Myocardial Perfusion Scan Result - - nl scan, EF 69% nl scan, EF 69% - -   Echocardiogram - Complete Result Normal EF, Enlarged rt side Normal EF, Enlarged RT side - - EF 60% EF 60%   Some recent data might be hidden     Echo:4/2016   NORMAL GLOBAL LEFT VENTRICULAR SYSTOLIC FUNCTION. EJECTION FRACTION OF 65%. MILD DIASTOLIC DYSFUNCTION. DILATED LEFT ATRIUM. ESTIMATED PULMONARY ARTERY PRESSURE IS 39 MMHG. NO HEMODYNAMICALLY SIGNIFICANT VALVULAR PATHOLOGY. OTHER FINDINGS AS NOTED BELOW. I have personally reviewed patients ekg done at other facility. 7/2016-  I Have personally reviewed recent relevant labs available and discussed with patient  lui Villarreal[facility]-7/2016  I have personally reviewed patient's records available from hospital and other providers and incorporated findings in patient care. IMAGING AND PROCEDURES-10/2016  1. Chest x-ray was done and showed top normal size of heart, stable  hyperinflation, stable chronic increased interstitial marking consistent  with interstitial lung disease.   2. Dobutamine stress test was done, reported EF of 44% with a small  reversible inferior wall defect.-medically reated  3. Echocardiogram was done and showed 55% with grade 1 diastolic dysfunction. 4. Sputum culture was done and showed a few Demetra albicans.     Assessment       ICD-10-CM [id number]    1. Diastolic CHF, chronic (HCC) I50.32 428.32      428.0     stable   2. Type 2 diabetes mellitus with stage 3 chronic kidney disease, with long-term current use of insulin (HCC) E11.22 250.40     N18.3 585.3     Z79.4 V58.67     on insulin  lab with pcp   3. Chronic bronchitis, unspecified chronic bronchitis type ([facility] J42 491.9     on home o2   4. Hyperlipidemia, unspecified hyperlipidemia type E78.5 272.4     ldl controlled  high tg-patient had bagle before lab   5. Essential hypertension I10 401.9     bp low today   asymptomatic  monitor   ok to stop plavix for procedures    Medications Discontinued During This Encounter   Medication Reason    predniSONE (DELTASONE) 10 mg tablet Therapy Completed    azithromycin (ZITHROMAX) 500 mg tab Therapy Completed    l-methylfolate-vit b12-vit b6 (METANX) 2.8-2-25 mg Tab Not A Current Medication    tolterodine (DETROL) 1 mg tablet Not A Current Medication       No orders of the defined types were placed in this encounter. Follow-up Disposition:  Return in about 6 months (around 8/22/2018) for bp chart at home.

## 2018-02-22 NOTE — MR AVS SNAPSHOT
[facility] 
 
 
 [address] [facility] [address], [address] [facility] [address] 
[phone] Patient: Juli Allen MRN: [id number] SSB:[id number] Visit Information Date & Time Provider Department Dept. Phone Encounter #  
 2/22/2018  9:30 AM Ricardo Shepard MD [facility] [address] [place] [address] [id number] Follow-up Instructions Return in about 6 months (around 8/22/2018) for bp chart at home. Your Appointments 8/9/2018 10:15 AM  
ESTABLISHED PATIENT with Ricardo Shepard MD  
[facility]) Appt Note: 6 months [address] [address] [address], [address] [facility] [address]  
[address], [address] Upcoming Health Maintenance Date Due  
 FOOT EXAM Q1 9/12/1966 EYE EXAM RETINAL OR DILATED Q1 9/12/1966 DTaP/Tdap/Td series (1 - Tdap) 9/12/1977 FOBT Q 1 YEAR AGE 50-75 9/12/2006 BREAST CANCER SCRN MAMMOGRAM 3/20/2015 PAP AKA CERVICAL CYTOLOGY 8/27/2015 ZOSTER VACCINE AGE 60> 7/12/2016 HEMOGLOBIN A1C Q6M 10/20/2017 MICROALBUMIN Q1 5/11/2018 LIPID PANEL Q1 2/19/2019 Allergies as of 2/22/2018  Review Complete On: 2/22/2018 By: Ricardo Shepard MD  
  
 Severity Noted Reaction Type Reactions Moxifloxacin  09/24/2015    Rash Pcn [Penicillins]    Swelling Sulfa (Sulfonamide Antibiotics)    Swelling Current Immunizations  Reviewed on 10/25/2017 Name Date Influenza Vaccine 10/2/2017, 10/10/2015, 12/29/2014 Influenza Vaccine Split 10/30/2012 Pneumococcal Polysaccharide (PPSV-23) 7/10/2015 Not reviewed this visit You Were Diagnosed With   
  
 Codes Comments Diastolic CHF, chronic (HCC)    -  Primary ICD-10-CM: I50.32 
ICD-9-CM: 428.32, 428.0 stable Type 2 diabetes mellitus with stage 3 chronic kidney disease, with long-term current use of insulin (HCC)     ICD-10-CM: E11.22, N18.3, Z79.4 ICD-9-CM: 250.40, 585.3, V58.67 on insulin 
lab with pcp Chronic bronchitis, unspecified chronic bronchitis type ([facility] [address] [address]     ICD-10-CM: Y90 ICD-9-CM: 491.9 on home o2 Hyperlipidemia, unspecified hyperlipidemia type     ICD-10-CM: E78.5 ICD-9-CM: 272.4 ldl controlled 
high tg-patient had bagle before lab Essential hypertension     ICD-10-CM: I10 
ICD-9-CM: 401.9 bp low today  
asymptomatic 
monitor Vitals BP Pulse Height(growth percentile) Weight(growth percentile) LMP BMI  
 (!) 84/54 95 5' 5\" (1.651 m) 199 lb (90.3 kg) 11/25/2012 (Exact Date) 33.12 kg/m2 OB Status Smoking Status Postmenopausal Current Every Day Smoker Vitals History BMI and BSA Data Body Mass Index Body Surface Area  
 33.12 kg/m 2 2.04 m 2 Preferred Pharmacy Pharmacy Name Phone [facility], [facility] [address] [phone] Your Updated Medication List  
  
   
This list is accurate as of 2/22/18 10:00 AM.  Always use your most recent med list.  
  
  
  
  
 ABILIFY 5 mg tablet Generic drug:  ARIPiprazole Take 10 mg by mouth daily. Indications: BIPOLAR DISORDER IN REMISSION  
  
 * albuterol 2.5 mg /3 mL (0.083 %) nebulizer solution Commonly known as:  PROVENTIL VENTOLIN  
USE 1 NEB EVERY 4 HOURS FOR WHEEZING AND SHORTNESS OF BREATH  Indications: CHRONIC OBSTRUCTIVE PULMONARY DISEASE * albuterol 90 mcg/actuation inhaler Commonly known as:  PROVENTIL HFA Take 2 Puffs by inhalation every four (4) hours as needed for Wheezing. allopurinol 100 mg tablet Commonly known as:  Rhodia Bannister Take  by mouth daily. ANORO ELLIPTA 62.5-25 mcg/actuation inhaler Generic drug:  umeclidinium-vilanterol INHALE 1 PUFF DAILY  
  
 aspirin 81 mg chewable tablet Take 1 Tab by mouth daily (with breakfast). Indications: prevention of cerebrovascular accident  
  
 cholecalciferol 1,000 unit tablet Commonly known as:  VITAMIN D3  
 Take 2 Tabs by mouth daily. Indications: PREVENTION OF VITAMIN D DEFICIENCY  
  
 DEPAKOTE 250 mg tablet Generic drug:  divalproex DR Take 250 mg by mouth nightly. Indications: BIPOLAR DISORDER IN REMISSION  
  
 famotidine 20 mg tablet Commonly known as:  PEPCID Take 1 Tab by mouth daily. Indications: PREVENTION OF STRESS ULCER  
  
 insulin glargine 100 unit/mL injection Commonly known as:  LANTUS Inject 70 units subcutaneously before breakfast (7AM) and 40 units subcutaneously after dinner (7PM). Indications: type 2 diabetes mellitus  
  
 insulin lispro 100 unit/mL injection Commonly known as:  HUMALOG To be given 15 min before meals: < 150 - None, 151-200 - 2 u, 201-250 - 4 u, 251-300 - 6 u, 301-350 - 8 u, 351-400 - 10 u, >400 - 12 u  
  
 insulin syringe,safetyneedle 1 mL 30 gauge x 5/16\" Syrg As directed  
  
 levothyroxine 100 mcg tablet Commonly known as:  SYNTHROID Take 1 Tab by mouth Daily (before breakfast). Indications: hypothyroidism  
  
 metOLazone 2.5 mg tablet Commonly known as:  Rojo Curia Take  by mouth daily. Nebulizer Accessories Kit Use with nebulizer machine NEURONTIN 300 mg capsule Generic drug:  gabapentin Take 300 mg by mouth three (3) times daily. Indications: NEUROPATHIC PAIN  
  
 oxybutynin 5 mg tablet Commonly known as:  MFOJFJTS Take 5 mg by mouth three (3) times daily. oxyCODONE-acetaminophen 5-325 mg per tablet Commonly known as:  PERCOCET Take 1 Tab by mouth every six (6) hours as needed for Pain. Max Daily Amount: 4 Tabs. Indications: Pain Oxygen-Air Delivery Systems  
by Nasal route continuous. 2 LPM via NC  Indications: Hypoxemia requiring supplementary oxygen PLAVIX 75 mg Tab Generic drug:  clopidogrel Take  by mouth. rosuvastatin 5 mg tablet Commonly known as:  CRESTOR Take 1 Tab by mouth nightly. traMADol 50 mg tablet Commonly known as:  ULTRAM  
  
 traZODone 100 mg tablet Commonly known as:  Juliane Angelique Take 100 mg by mouth nightly. Indications: major depressive disorder TYLENOL 325 mg tablet Generic drug:  acetaminophen Take 325 mg by mouth every six (6) hours as needed for Pain. Indications: Fever, Pain ZOLOFT 100 mg tablet Generic drug:  sertraline Take 50 mg by mouth daily. Indications: DEPRESSION ASSOCIATED WITH BIPOLAR DISORDER * Notice: This list has 2 medication(s) that are the same as other medications prescribed for you. Read the directions carefully, and ask your doctor or other care provider to review them with you. Follow-up Instructions Return in about 6 months (around 8/22/2018) for bp chart at home. Introducing [address]! Dear Stephanie Ortiz: Thank you for requesting a [employer] account. Our records indicate that you have previously registered for a [employer] account but its currently inactive. Please call our [employer] support line at [phone]. Additional Information If you have questions, please visit the Frequently Asked Questions section of the [employer] website at [email or web address] Remember, [employer] is NOT to be used for urgent needs. For medical emergencies, dial 911. Now available from your iPhone and Android! Please provide this summary of care documentation to your next provider. Your primary care clinician is listed as Viktor Barron. If you have any questions after today's visit, please call [phone].

## 2018-02-22 NOTE — LETTER
Stephen Sinha 1955 2/22/2018 Dear Yogesh Wood MD 
 
I had the pleasure of evaluating  Ms. Sinha in office today. Below are the relevant portions of my assessment and plan of care. ICD-10-CM ICD-9-CM 1. Diastolic CHF, chronic (HCC) I50.32 428.32   
  428.0   
 stable 2. Type 2 diabetes mellitus with stage 3 chronic kidney disease, with long-term current use of insulin (HCC) E11.22 250.40 N18.3 585.3   
 Z79.4 V58.67   
 on insulin 
lab with pcp 3. Chronic bronchitis, unspecified chronic bronchitis type ([address] J42 491.9   
 on home o2  
4. Hyperlipidemia, unspecified hyperlipidemia type E78.5 272.4   
 ldl controlled 
high tg-patient had bagle before lab 5. Essential hypertension I10 401.9   
 bp low today  
asymptomatic 
monitor Current Outpatient Prescriptions Medication Sig Dispense Refill  oxybutynin (DITROPAN) 5 mg tablet Take 5 mg by mouth three (3) times daily.  metOLazone (ZAROXOLYN) 2.5 mg tablet Take  by mouth daily.  albuterol (PROVENTIL HFA) 90 mcg/actuation inhaler Take 2 Puffs by inhalation every four (4) hours as needed for Wheezing. 1 Inhaler 3  
 allopurinol (ZYLOPRIM) 100 mg tablet Take  by mouth daily.  ANORO ELLIPTA 62.5-25 mcg/actuation inhaler INHALE 1 PUFF DAILY 1 Inhaler 5  clopidogrel (PLAVIX) 75 mg tab Take  by mouth.  traMADol (ULTRAM) 50 mg tablet  famotidine (PEPCID) 20 mg tablet Take 1 Tab by mouth daily. Indications: PREVENTION OF STRESS ULCER 15 Tab 0  
 insulin glargine (LANTUS) 100 unit/mL injection Inject 70 units subcutaneously before breakfast (7AM) and 40 units subcutaneously after dinner (7PM).   Indications: type 2 diabetes mellitus 1 Vial 0  
 insulin lispro (HUMALOG) 100 unit/mL injection To be given 15 min before meals: < 150 - None, 151-200 - 2 u, 201-250 - 4 u, 251-300 - 6 u, 301-350 - 8 u, 351-400 - 10 u, >400 - 12 u 1 Vial 0  
  aspirin 81 mg chewable tablet Take 1 Tab by mouth daily (with breakfast). Indications: prevention of cerebrovascular accident 13 Tab 0  
 levothyroxine (SYNTHROID) 100 mcg tablet Take 1 Tab by mouth Daily (before breakfast). Indications: hypothyroidism 15 Tab 0  cholecalciferol (VITAMIN D3) 1,000 unit tablet Take 2 Tabs by mouth daily. Indications: PREVENTION OF VITAMIN D DEFICIENCY 30 Tab 0  
 divalproex DR (DEPAKOTE) 250 mg tablet Take 250 mg by mouth nightly. Indications: BIPOLAR DISORDER IN REMISSION  insulin syringe,safetyneedle 1 mL 30 gauge x 5/16\" syrg As directed 50 Each 0  
 ARIPiprazole (ABILIFY) 5 mg tablet Take 10 mg by mouth daily. Indications: BIPOLAR DISORDER IN REMISSION    
 albuterol (PROVENTIL VENTOLIN) 2.5 mg /3 mL (0.083 %) nebulizer solution USE 1 NEB EVERY 4 HOURS FOR WHEEZING AND SHORTNESS OF BREATH  Indications: CHRONIC OBSTRUCTIVE PULMONARY DISEASE 2 Package 3  
 gabapentin (NEURONTIN) 300 mg capsule Take 300 mg by mouth three (3) times daily. Indications: NEUROPATHIC PAIN    
 acetaminophen (TYLENOL) 325 mg tablet Take 325 mg by mouth every six (6) hours as needed for Pain. Indications: Fever, Pain  traZODone (DESYREL) 100 mg tablet Take 100 mg by mouth nightly. Indications: major depressive disorder  rosuvastatin (CRESTOR) 5 mg tablet Take 1 Tab by mouth nightly. 30 Tab 6  Nebulizer Accessories Kit Use with nebulizer machine 1 Kit prn  sertraline (ZOLOFT) 100 mg tablet Take 50 mg by mouth daily. Indications: DEPRESSION ASSOCIATED WITH BIPOLAR DISORDER    
 Oxygen-Air Delivery Systems by Nasal route continuous. 2 LPM via NC  Indications: Hypoxemia requiring supplementary oxygen  oxyCODONE-acetaminophen (PERCOCET) 5-325 mg per tablet Take 1 Tab by mouth every six (6) hours as needed for Pain. Max Daily Amount: 4 Tabs. Indications: Pain 15 Tab 0 Orders Placed This Encounter  oxybutynin (DITROPAN) 5 mg tablet Sig: Take 5 mg by mouth three (3) times daily. If you have questions, please do not hesitate to call me. I look forward to following Ms. Sinha along with you. Sincerely, Shakeel Michael MD

## 2018-02-22 NOTE — PROGRESS NOTES
Patient brought medications list    1. Have you been to the ER, urgent care clinic since your last visit? Hospitalized since your last visit? No    2. Have you seen or consulted any other health care providers outside of the [address] [place] [address] since your last visit? Include any pap smears or colon screening.  Yes Where: Nephrology Routine/ Pulmonary Routine

## 2018-03-28 RX ORDER — ROSUVASTATIN CALCIUM 5 MG/1
TABLET, COATED ORAL
Qty: 90 TAB | Refills: 3 | Status: SHIPPED | OUTPATIENT
Start: 2018-03-28 | End: 2019-02-21 | Stop reason: SDUPTHER

## 2018-07-06 ENCOUNTER — TELEPHONE (OUTPATIENT)
Dept: PULMONOLOGY | Age: 63
End: 2018-07-06

## 2018-07-06 RX ORDER — PREDNISONE 10 MG/1
TABLET ORAL
Qty: 18 TAB | Refills: 0 | Status: SHIPPED | OUTPATIENT
Start: 2018-07-06 | End: 2018-08-09 | Stop reason: ALTCHOICE

## 2018-07-06 RX ORDER — AZITHROMYCIN 250 MG/1
250 TABLET, FILM COATED ORAL SEE ADMIN INSTRUCTIONS
Qty: 6 TAB | Refills: 0 | Status: SHIPPED | OUTPATIENT
Start: 2018-07-06 | End: 2018-07-11

## 2018-07-06 NOTE — TELEPHONE ENCOUNTER
PT CALLED([phone]). SHE HAS HAD A COUGH FOR ABOUT 2 WEEKS. PLEASE LET DR Saenz  KNOW AND CALL HER BACK.

## 2018-07-06 NOTE — TELEPHONE ENCOUNTER
Patient reports cough with hard to produce thick white mucus, a little SOB, wheezing yesterday. Denies fever.

## 2018-07-06 NOTE — TELEPHONE ENCOUNTER
Dr Chris Collins had ordered Prednisone taper and Azithromycin (Z pack). These have been sent to your pharmacy. If you have any questions let us know.

## 2018-07-16 NOTE — TELEPHONE ENCOUNTER
A fax is received for refills on ProAir HFA from the pt's pharmacy. The RF is pended to Dr. Edwina Pan.

## 2018-07-17 RX ORDER — ALBUTEROL SULFATE 90 UG/1
2 AEROSOL, METERED RESPIRATORY (INHALATION)
Qty: 1 INHALER | Refills: 5 | Status: SHIPPED | OUTPATIENT
Start: 2018-07-17 | End: 2018-12-28 | Stop reason: SDUPTHER

## 2018-08-09 ENCOUNTER — OFFICE VISIT (OUTPATIENT)
Dept: CARDIOLOGY CLINIC | Age: 63
End: 2018-08-09

## 2018-08-09 VITALS
BODY MASS INDEX: 30.82 KG/M2 | SYSTOLIC BLOOD PRESSURE: 133 MMHG | DIASTOLIC BLOOD PRESSURE: 70 MMHG | WEIGHT: 185 LBS | HEART RATE: 74 BPM | HEIGHT: 65 IN

## 2018-08-09 DIAGNOSIS — E11.22 TYPE 2 DIABETES MELLITUS WITH STAGE 3 CHRONIC KIDNEY DISEASE, WITH LONG-TERM CURRENT USE OF INSULIN (HCC): Chronic | ICD-10-CM

## 2018-08-09 DIAGNOSIS — N18.30 TYPE 2 DIABETES MELLITUS WITH STAGE 3 CHRONIC KIDNEY DISEASE, WITH LONG-TERM CURRENT USE OF INSULIN (HCC): Chronic | ICD-10-CM

## 2018-08-09 DIAGNOSIS — Z79.4 TYPE 2 DIABETES MELLITUS WITH STAGE 3 CHRONIC KIDNEY DISEASE, WITH LONG-TERM CURRENT USE OF INSULIN (HCC): Chronic | ICD-10-CM

## 2018-08-09 DIAGNOSIS — N18.31 CKD STAGE G3A/A1, GFR 45-59 AND ALBUMIN CREATININE RATIO <30 MG/G (HCC): Chronic | ICD-10-CM

## 2018-08-09 DIAGNOSIS — D86.9 SARCOIDOSIS: Chronic | ICD-10-CM

## 2018-08-09 DIAGNOSIS — I50.32 DIASTOLIC CHF, CHRONIC (HCC): Primary | ICD-10-CM

## 2018-08-09 DIAGNOSIS — R09.02 HYPOXIA: ICD-10-CM

## 2018-08-09 DIAGNOSIS — Z01.810 PRE-OPERATIVE CARDIOVASCULAR EXAMINATION: ICD-10-CM

## 2018-08-09 DIAGNOSIS — J42 CHRONIC BRONCHITIS, UNSPECIFIED CHRONIC BRONCHITIS TYPE (HCC): Chronic | ICD-10-CM

## 2018-08-09 NOTE — MR AVS SNAPSHOT
[facility] 
 
 
 [address], [address] [facility] [address] 
[phone] Patient: Abby Pleitez MRN: [id number] AVE:[birth year] Visit Information Date & Time Provider Department Dept. Phone Encounter #  
 8/9/2018 11:15 AM Vinicius Moraes MD [facility] [address] [place] [address] [id number] Follow-up Instructions Return in about 6 months (around 2/9/2019) for Cleared for planned surgery, moderate risk. Your Appointments 2/21/2019 10:30 AM  
Office Visit with Vinicius Moraes MD  
[facility]) Appt Note: 6 months [address], [address] [facility] [address] [address], [address] [place] [address] Upcoming Health Maintenance Date Due  
 FOOT EXAM Q1 9/12/1966 EYE EXAM RETINAL OR DILATED Q1 9/12/1966 DTaP/Tdap/Td series (1 - Tdap) 9/12/1977 FOBT Q 1 YEAR AGE 50-75 9/12/2006 BREAST CANCER SCRN MAMMOGRAM 3/20/2015 PAP AKA CERVICAL CYTOLOGY 8/27/2015 ZOSTER VACCINE AGE 60> 7/12/2016 HEMOGLOBIN A1C Q6M 10/20/2017 MICROALBUMIN Q1 5/11/2018 Influenza Age 5 to Adult 8/1/2018 LIPID PANEL Q1 2/19/2019 Allergies as of 8/9/2018  Review Complete On: 8/9/2018 By: Vinicius Moraes MD  
  
 Severity Noted Reaction Type Reactions Moxifloxacin  09/24/2015    Rash Pcn [Penicillins]    Swelling Sulfa (Sulfonamide Antibiotics)    Swelling Current Immunizations  Reviewed on 10/25/2017 Name Date Influenza Vaccine 10/2/2017, 10/10/2015, 12/29/2014 Influenza Vaccine Split 10/30/2012 Pneumococcal Polysaccharide (PPSV-23) 7/10/2015 Not reviewed this visit You Were Diagnosed With   
  
 Codes Comments Diastolic CHF, chronic (HCC)    -  Primary ICD-10-CM: I50.32 
ICD-9-CM: 428.32, 428.0 Stable. Compensated. No fluid overload.   
 CKD stage G3a/A1, GFR 45-59 and albumin creatinine ratio <30 mg/g ICD-10-CM: N18.3 ICD-9-CM: 627. 3 Stable Chronic bronchitis, unspecified chronic bronchitis type ([facility]     ICD-10-CM: Y39 ICD-9-CM: 491.9 Stable. On home oxygen. Type 2 diabetes mellitus with stage 3 chronic kidney disease, with long-term current use of insulin (HCC)     ICD-10-CM: E11.22, N18.3, Z79.4 ICD-9-CM: 250.40, 585.3, V58.67 Stable Sarcoidosis     ICD-10-CM: D86.9 ICD-9-CM: 135 Stable. Continue home oxygen Hypoxia     ICD-10-CM: R09.02 
ICD-9-CM: 799.02   
 Pre-operative cardiovascular examination     ICD-10-CM: Z01.810 ICD-9-CM: V72.81 For teeth extraction. Stable cardiac status. Okay to proceed with the moderate risk primarily related to sarcoidosis and hypoxia. Vitals BP Pulse Height(growth percentile) Weight(growth percentile) LMP BMI  
 133/70 74 5' 5\" (1.651 m) 185 lb (83.9 kg) 11/25/2012 (Exact Date) 30.79 kg/m2 OB Status Smoking Status Postmenopausal Current Every Day Smoker Vitals History BMI and BSA Data Body Mass Index Body Surface Area 30.79 kg/m 2 1.96 m 2 Preferred Pharmacy Pharmacy Name [facility] Melina Mauro, [address] [phone] Your Updated Medication List  
  
   
This list is accurate as of 8/9/18 11:58 AM.  Always use your most recent med list.  
  
  
  
  
 ABILIFY 5 mg tablet Generic drug:  ARIPiprazole Take 10 mg by mouth daily. Indications: BIPOLAR DISORDER IN REMISSION  
  
 * albuterol 2.5 mg /3 mL (0.083 %) nebulizer solution Commonly known as:  PROVENTIL VENTOLIN  
USE 1 NEB EVERY 4 HOURS FOR WHEEZING AND SHORTNESS OF BREATH  Indications: CHRONIC OBSTRUCTIVE PULMONARY DISEASE * albuterol 90 mcg/actuation inhaler Commonly known as:  PROAIR HFA Take 2 Puffs by inhalation every four (4) hours as needed for Wheezing. allopurinol 100 mg tablet Commonly known as:  Jamil Maria Take  by mouth daily. ANORO ELLIPTA 62.5-25 mcg/actuation inhaler Generic drug:  umeclidinium-vilanterol INHALE 1 PUFF DAILY  
  
 aspirin 81 mg chewable tablet Take 1 Tab by mouth daily (with breakfast). Indications: prevention of cerebrovascular accident  
  
 cholecalciferol 1,000 unit tablet Commonly known as:  VITAMIN D3 Take 2 Tabs by mouth daily. Indications: PREVENTION OF VITAMIN D DEFICIENCY  
  
 DEPAKOTE 250 mg tablet Generic drug:  divalproex DR Take 250 mg by mouth nightly. Indications: BIPOLAR DISORDER IN REMISSION  
  
 famotidine 20 mg tablet Commonly known as:  PEPCID Take 1 Tab by mouth daily. Indications: PREVENTION OF STRESS ULCER  
  
 insulin glargine 100 unit/mL injection Commonly known as:  LANTUS Inject 70 units subcutaneously before breakfast (7AM) and 40 units subcutaneously after dinner (7PM). Indications: type 2 diabetes mellitus  
  
 insulin lispro 100 unit/mL injection Commonly known as:  HUMALOG To be given 15 min before meals: < 150 - None, 151-200 - 2 u, 201-250 - 4 u, 251-300 - 6 u, 301-350 - 8 u, 351-400 - 10 u, >400 - 12 u  
  
 insulin syringe,safetyneedle 1 mL 30 gauge x 5/16\" Syrg As directed  
  
 levothyroxine 100 mcg tablet Commonly known as:  SYNTHROID Take 1 Tab by mouth Daily (before breakfast). Indications: hypothyroidism  
  
 metOLazone 2.5 mg tablet Commonly known as:  Marshall Maxon Take 2.5 mg by mouth every fourty-eight (48) hours. Nebulizer Accessories Kit Use with nebulizer machine NEURONTIN 300 mg capsule Generic drug:  gabapentin Take 300 mg by mouth daily. Indications: NEUROPATHIC PAIN  
  
 oxybutynin 5 mg tablet Commonly known as:  RWVTOTKJ Take 5 mg by mouth two (2) times a day. oxyCODONE-acetaminophen 5-325 mg per tablet Commonly known as:  PERCOCET Take 1 Tab by mouth every six (6) hours as needed for Pain. Max Daily Amount: 4 Tabs. Indications: Pain Oxygen-Air Delivery Systems by Nasal route continuous. 2 LPM via NC  Indications: Hypoxemia requiring supplementary oxygen  
  
 rosuvastatin 5 mg tablet Commonly known as:  CRESTOR  
TAKE ONE TABLET NIGHTLY  
  
 traMADol 50 mg tablet Commonly known as:  ULTRAM  
  
 traZODone 100 mg tablet Commonly known as:  Cary Cower Take 100 mg by mouth as needed. Indications: major depressive disorder TYLENOL 325 mg tablet Generic drug:  acetaminophen Take 325 mg by mouth every six (6) hours as needed for Pain. Indications: Fever, Pain ZOLOFT 100 mg tablet Generic drug:  sertraline Take 50 mg by mouth daily. Indications: DEPRESSION ASSOCIATED WITH BIPOLAR DISORDER * Notice: This list has 2 medication(s) that are the same as other medications prescribed for you. Read the directions carefully, and ask your doctor or other care provider to review them with you. Follow-up Instructions Return in about 6 months (around 2/9/2019) for Cleared for planned surgery, moderate risk. Introducing \[facility]\"" & HEALTH SERVICES! Yasmine Draper introduces [facility] patient portal. Now you can access parts of your medical record, email your doctor's office, and request medication refills online. 1. In your internet browser, go to [email or web address] 2. Click on the First Time User? Click Here link in the Sign In box. You will see the New Member Sign Up page. 3. Enter your [facility] Access Code exactly as it appears below. You will not need to use this code after youve completed the sign-up process. If you do not sign up before the expiration date, you must request a new code. · [facility] Access Code: [email or web address] Expires: 11/7/2018 11:29 AM 
 
4. Enter the last four digits of your Social Security Number (xxxx) and Date of Birth (mm/dd/yyyy) as indicated and click Submit. You will be taken to the next sign-up page. 5. Create a [facility] ID.  This will be your [facility] login ID and cannot be changed, so think of one that is secure and easy to remember. 6. Create a [employer] password. You can change your password at any time. 7. Enter your Password Reset Question and Answer. This can be used at a later time if you forget your password. 8. Enter your e-mail address. You will receive e-mail notification when new information is available in [address] 9. Click Sign Up. You can now view and download portions of your medical record. 10. Click the Download Summary menu link to download a portable copy of your medical information. If you have questions, please visit the Frequently Asked Questions section of the [employer] website. Remember, [employer] is NOT to be used for urgent needs. For medical emergencies, dial 911. Now available from your iPhone and Android! Please provide this summary of care documentation to your next provider. Your primary care clinician is listed as Chris Grove. If you have any questions after today's visit, please call [phone].

## 2018-08-09 NOTE — LETTER
Stephen Sinha 1955 8/9/2018 Dear MD Paul Stevens MD Wardell Butts, MD Georgian Pierre, MD 
 
I had the pleasure of evaluating  Ms. Sinha in office today. Below are the relevant portions of my assessment and plan of care. ICD-10-CM ICD-9-CM 1. Diastolic CHF, chronic (HCC) I50.32 428.32   
  428.0 Stable. Compensated. No fluid overload. 2. CKD stage G3a/A1, GFR 45-59 and albumin creatinine ratio <30 mg/g N18.3 585. 3 Stable 3. Chronic bronchitis, unspecified [facility] J42 491.9 Stable. On home oxygen. 4. Type 2 diabetes mellitus with stage 3 chronic kidney disease, with long-term current use of insulin (HCC) E11.22 250.40 N18.3 585.3   
 Z79.4 V58.67 Stable 5. Sarcoidosis D86.9 135 Stable. Continue home oxygen 6. Hypoxia R09.02 799.02   
7. Pre-operative cardiovascular examination Z01.810 V72.81 For teeth extraction. Stable cardiac status. Okay to proceed with the moderate risk primarily related to sarcoidosis and hypoxia. Current Outpatient Prescriptions Medication Sig Dispense Refill  albuterol (PROAIR HFA) 90 mcg/actuation inhaler Take 2 Puffs by inhalation every four (4) hours as needed for Wheezing. 1 Inhaler 5  
 rosuvastatin (CRESTOR) 5 mg tablet TAKE ONE TABLET NIGHTLY 90 Tab 3  
 oxybutynin (DITROPAN) 5 mg tablet Take 5 mg by mouth two (2) times a day.  metOLazone (ZAROXOLYN) 2.5 mg tablet Take 2.5 mg by mouth every fourty-eight (48) hours.  allopurinol (ZYLOPRIM) 100 mg tablet Take  by mouth daily.  ANORO ELLIPTA 62.5-25 mcg/actuation inhaler INHALE 1 PUFF DAILY 1 Inhaler 5  
 traMADol (ULTRAM) 50 mg tablet  famotidine (PEPCID) 20 mg tablet Take 1 Tab by mouth daily.  Indications: PREVENTION OF STRESS ULCER 15 Tab 0  
 insulin glargine (LANTUS) 100 unit/mL injection Inject 70 units subcutaneously before breakfast (7AM) and 40 units subcutaneously after dinner (7PM). Indications: type 2 diabetes mellitus 1 Vial 0  
 insulin lispro (HUMALOG) 100 unit/mL injection To be given 15 min before meals: < 150 - None, 151-200 - 2 u, 201-250 - 4 u, 251-300 - 6 u, 301-350 - 8 u, 351-400 - 10 u, >400 - 12 u 1 Vial 0  
 aspirin 81 mg chewable tablet Take 1 Tab by mouth daily (with breakfast). Indications: prevention of cerebrovascular accident 13 Tab 0  
 levothyroxine (SYNTHROID) 100 mcg tablet Take 1 Tab by mouth Daily (before breakfast). Indications: hypothyroidism 15 Tab 0  cholecalciferol (VITAMIN D3) 1,000 unit tablet Take 2 Tabs by mouth daily. Indications: PREVENTION OF VITAMIN D DEFICIENCY 30 Tab 0  
 divalproex DR (DEPAKOTE) 250 mg tablet Take 250 mg by mouth nightly. Indications: BIPOLAR DISORDER IN REMISSION  insulin syringe,safetyneedle 1 mL 30 gauge x 5/16\" syrg As directed 50 Each 0  
 ARIPiprazole (ABILIFY) 5 mg tablet Take 10 mg by mouth daily. Indications: BIPOLAR DISORDER IN REMISSION    
 albuterol (PROVENTIL VENTOLIN) 2.5 mg /3 mL (0.083 %) nebulizer solution USE 1 NEB EVERY 4 HOURS FOR WHEEZING AND SHORTNESS OF BREATH  Indications: CHRONIC OBSTRUCTIVE PULMONARY DISEASE 2 Package 3  
 gabapentin (NEURONTIN) 300 mg capsule Take 300 mg by mouth daily. Indications: NEUROPATHIC PAIN    
 acetaminophen (TYLENOL) 325 mg tablet Take 325 mg by mouth every six (6) hours as needed for Pain. Indications: Fever, Pain  traZODone (DESYREL) 100 mg tablet Take 100 mg by mouth as needed. Indications: major depressive disorder  Nebulizer Accessories Kit Use with nebulizer machine 1 Kit prn  sertraline (ZOLOFT) 100 mg tablet Take 50 mg by mouth daily. Indications: DEPRESSION ASSOCIATED WITH BIPOLAR DISORDER    
 Oxygen-Air Delivery Systems by Nasal route continuous. 2 LPM via NC  Indications: Hypoxemia requiring supplementary oxygen  clopidogrel (PLAVIX) 75 mg tab Take  by mouth.  oxyCODONE-acetaminophen (PERCOCET) 5-325 mg per tablet Take 1 Tab by mouth every six (6) hours as needed for Pain. Max Daily Amount: 4 Tabs. Indications: Pain 15 Tab 0 No orders of the defined types were placed in this encounter. If you have questions, please do not hesitate to call me. I look forward to following Ms. Sinha along with you. Sincerely, Ni Mancia MD

## 2018-08-09 NOTE — PROGRESS NOTES
HISTORY OF PRESENT ILLNESS  Heather Sifuentes is a 65 y.o. female. CHF   The history is provided by the patient. This is a chronic problem. The problem occurs constantly. The problem has not changed since onset. Associated symptoms include shortness of breath. Pertinent negatives include no chest pain. The symptoms are aggravated by exertion. The symptoms are relieved by rest.   Cholesterol Problem   The history is provided by the patient. This is a chronic problem. The problem occurs constantly. The problem has not changed since onset. Associated symptoms include shortness of breath. Pertinent negatives include no chest pain. Hypertension   The history is provided by the patient. This is a chronic problem. The problem occurs constantly. The problem has not changed since onset. Associated symptoms include shortness of breath. Pertinent negatives include no chest pain. Nothing aggravates the symptoms. Shortness of Breath   The history is provided by the patient. This is a recurrent problem. The problem occurs frequently. The problem has been gradually improving. Associated symptoms include leg swelling. Pertinent negatives include no fever, no cough, no sputum production, no hemoptysis, no wheezing, no PND, no orthopnea, no chest pain, no vomiting, no rash and no claudication. Associated medical issues include COPD and heart failure. Leg Swelling   The history is provided by the patient. This is a recurrent problem. The problem occurs every several days. Associated symptoms include shortness of breath. Pertinent negatives include no chest pain.      Family History   Problem Relation Age of Onset    Seizures Other     Diabetes Mother     Hypertension Mother     Heart Disease Mother     Cancer Mother     Diabetes Father     Stroke Father     Heart Disease Father     Headache Sister     Depression Neg Hx     Suicide Neg Hx     Psychotic Disorder Neg Hx     Substance Abuse Neg Hx     Dementia Neg Hx Past Medical History:   Diagnosis Date    Abnormally low high density lipoprotein (HDL) cholesterol with hypertriglyceridemia     Lipid profile (2016) showed , , HDL 38, LDL 37    Anxiety     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 congestive heart failure     Better controlled     Bipolar affective disorder ([address] 2012    Cellulitis of right forearm 2017    Chronic back pain     Chronic obstructive pulmonary disease (COPD) (HCC)     SOB, on paula O2 Recent admission with psychosis     CKD stage G3a/A1, GFR 45-59 and albumin creatinine ratio <30 mg/g 2017    Urine microalbumin/Creatinine ratio (2017) = 9 mg/g    Contusion of left elbow 2017    Depression     Diabetic neuropathy associated with type 2 diabetes mellitus (HCC)     Diastolic dysfunction without heart failure     Stable on diuretics     Falls frequently     Gastroesophageal reflux disease with hiatal hernia     Generalized osteoarthritis of multiple sites     History of acute renal failure 2013    History of back injury     jumped out of second story window     History of hepatitis C     treated    History of penicillin allergy     History of vitamin D deficiency 5/10/2017    Hyperuricemia 2017    Hypothyroidism     Hypoxemia requiring supplemental oxygen 2014    Intravenous drug user 2017    Memory difficulty     Mixed connective tissue disease ([address] 5/10/2017    Obesity, Class I, BMI 30-34.9     Olecranon bursitis of right elbow 2017    Recurrent genital herpes 2013    Right Achilles tendinitis 2017    Sarcoidosis     Sickle cell trait (HCC)     Type 2 diabetes with stage 3 chronic kidney disease GFR 30-59 ([address]     Urge urinary incontinence 5/10/2017    Venous insufficiency     Wears glasses        Past Surgical History:   Procedure Laterality Date    HX BACK SURGERY      HX  SECTION      HX CYST REMOVAL Left 1979    Left foot    HX OTHER SURGICAL Left 04/17/2017    S/P incision and drainage of the abscess of the left hand and the left foot (4/17/2017 - Dr. Octavia Will. Tesfaye Cooper)    HX TUBAL LIGATION         Allergies   Allergen Reactions    Moxifloxacin Rash    Pcn [Penicillins] Swelling    Sulfa (Sulfonamide Antibiotics) Swelling       Current Outpatient Prescriptions   Medication Sig    albuterol (PROAIR HFA) 90 mcg/actuation inhaler Take 2 Puffs by inhalation every four (4) hours as needed for Wheezing.  rosuvastatin (CRESTOR) 5 mg tablet TAKE ONE TABLET NIGHTLY    oxybutynin (DITROPAN) 5 mg tablet Take 5 mg by mouth two (2) times a day.  metOLazone (ZAROXOLYN) 2.5 mg tablet Take 2.5 mg by mouth every fourty-eight (48) hours.  allopurinol (ZYLOPRIM) 100 mg tablet Take  by mouth daily.  ANORO ELLIPTA 62.5-25 mcg/actuation inhaler INHALE 1 PUFF DAILY    traMADol (ULTRAM) 50 mg tablet     famotidine (PEPCID) 20 mg tablet Take 1 Tab by mouth daily. Indications: PREVENTION OF STRESS ULCER    insulin glargine (LANTUS) 100 unit/mL injection Inject 70 units subcutaneously before breakfast (7AM) and 40 units subcutaneously after dinner (7PM). Indications: type 2 diabetes mellitus    insulin lispro (HUMALOG) 100 unit/mL injection To be given 15 min before meals: < 150 - None, 151-200 - 2 u, 201-250 - 4 u, 251-300 - 6 u, 301-350 - 8 u, 351-400 - 10 u, >400 - 12 u    aspirin 81 mg chewable tablet Take 1 Tab by mouth daily (with breakfast). Indications: prevention of cerebrovascular accident    levothyroxine (SYNTHROID) 100 mcg tablet Take 1 Tab by mouth Daily (before breakfast). Indications: hypothyroidism    cholecalciferol (VITAMIN D3) 1,000 unit tablet Take 2 Tabs by mouth daily. Indications: PREVENTION OF VITAMIN D DEFICIENCY    divalproex DR (DEPAKOTE) 250 mg tablet Take 250 mg by mouth nightly.  Indications: BIPOLAR DISORDER IN REMISSION    insulin syringe,safetyneedle 1 mL 30 gauge x 5/16\" syrg As directed    ARIPiprazole (ABILIFY) 5 mg tablet Take 10 mg by mouth daily. Indications: BIPOLAR DISORDER IN REMISSION    albuterol (PROVENTIL VENTOLIN) 2.5 mg /3 mL (0.083 %) nebulizer solution USE 1 NEB EVERY 4 HOURS FOR WHEEZING AND SHORTNESS OF BREATH  Indications: CHRONIC OBSTRUCTIVE PULMONARY DISEASE    gabapentin (NEURONTIN) 300 mg capsule Take 300 mg by mouth daily. Indications: NEUROPATHIC PAIN    acetaminophen (TYLENOL) 325 mg tablet Take 325 mg by mouth every six (6) hours as needed for Pain. Indications: Fever, Pain    traZODone (DESYREL) 100 mg tablet Take 100 mg by mouth as needed. Indications: major depressive disorder    Nebulizer Accessories Kit Use with nebulizer machine    sertraline (ZOLOFT) 100 mg tablet Take 50 mg by mouth daily. Indications: DEPRESSION ASSOCIATED WITH BIPOLAR DISORDER    Oxygen-Air Delivery Systems by Nasal route continuous. 2 LPM via NC  Indications: Hypoxemia requiring supplementary oxygen    oxyCODONE-acetaminophen (PERCOCET) 5-325 mg per tablet Take 1 Tab by mouth every six (6) hours as needed for Pain. Max Daily Amount: 4 Tabs. Indications: Pain     No current facility-administered medications for this visit. Visit Vitals    /70    Pulse 74    Ht 5' 5\" (1.651 m)    Wt 83.9 kg (185 lb)    LMP 11/25/2012 (Exact Date)    BMI 30.79 kg/m2       Review of Systems   Constitutional: Negative for chills and fever. HENT: Negative for nosebleeds. Eyes: Negative for blurred vision and double vision. Respiratory: Positive for shortness of breath. Negative for cough, hemoptysis, sputum production and wheezing. Cardiovascular: Positive for leg swelling. Negative for chest pain, palpitations, orthopnea, claudication and PND. Gastrointestinal: Negative for heartburn, nausea and vomiting. Musculoskeletal: Negative for myalgias. Skin: Negative for rash.    Neurological: Negative for dizziness and weakness. Endo/Heme/Allergies: Does not bruise/bleed easily.      Family History   Problem Relation Age of Onset    Seizures Other     Diabetes Mother     Hypertension Mother     Heart Disease Mother     Cancer Mother     Diabetes Father     Stroke Father     Heart Disease Father     Headache Sister     Depression Neg Hx     Suicide Neg Hx     Psychotic Disorder Neg Hx     Substance Abuse Neg Hx     Dementia Neg Hx        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 congestive heart failure     Better controlled     Bipolar affective disorder ([facility] 12/5/2012    Cellulitis of right forearm 5/4/2017    Chronic back pain     Chronic obstructive pulmonary disease (COPD) (HCC)     SOB, on paula O2 Recent admission with psychosis     CKD stage G3a/A1, GFR 45-59 and albumin creatinine ratio <30 mg/g 5/11/2017    Urine microalbumin/Creatinine ratio (5/11/2017) = 9 mg/g    Contusion of left elbow 5/4/2017    Depression     Diabetic neuropathy associated with type 2 diabetes mellitus (HCC)     Diastolic dysfunction without heart failure     Stable on diuretics     Falls frequently     Gastroesophageal reflux disease with hiatal hernia     Generalized osteoarthritis of multiple sites     History of acute renal failure 5/31/2013    History of back injury     jumped out of second story window     History of hepatitis C     treated    History of penicillin allergy     History of vitamin D deficiency 5/10/2017    Hyperuricemia 5/26/2017    Hypothyroidism     Hypoxemia requiring supplemental oxygen 12/29/2014    Intravenous drug user 5/2/2017    Memory difficulty     Mixed connective tissue disease ([facility] 5/10/2017    Obesity, Class I, BMI 30-34.9     Olecranon bursitis of right elbow 2017    Recurrent genital herpes 2013    Right Achilles tendinitis 2017    Sarcoidosis     [facility]     Type 2 diabetes with stage 3 chronic kidney disease GFR 30-59 ([facility])     Urge urinary incontinence 5/10/2017    Venous insufficiency     Wears glasses        Past Surgical History:   Procedure Laterality Date    HX BACK SURGERY  1986    HX  SECTION      HX CYST REMOVAL Left 1979    Left foot    HX OTHER SURGICAL Left 2017    S/P incision and drainage of the abscess of the left hand and the left foot (2017 - Dr. Y[facility]f [facility])    HX TUBAL LIGATION         Allergies   Allergen Reactions    Moxifloxacin Rash    Pcn [Penicillins] Swelling    Sulfa (Sulfonamide Antibiotics) Swelling       Current Outpatient Prescriptions   Medication Sig    albuterol (PROAIR HFA) 90 mcg/actuation inhaler Take 2 Puffs by inhalation every four (4) hours as needed for Wheezing.  rosuvastatin (CRESTOR) 5 mg tablet TAKE ONE TABLET NIGHTLY    oxybutynin (DITROPAN) 5 mg tablet Take 5 mg by mouth two (2) times a day.  metOLazone (ZAROXOLYN) 2.5 mg tablet Take 2.5 mg by mouth every fourty-eight (48) hours.  allopurinol (ZYLOPRIM) 100 mg tablet Take  by mouth daily.  ANORO ELLIPTA 62.5-25 mcg/actuation inhaler INHALE 1 PUFF DAILY    traMADol (ULTRAM) 50 mg tablet     famotidine (PEPCID) 20 mg tablet Take 1 Tab by mouth daily. Indications: PREVENTION OF STRESS ULCER    insulin glargine (LANTUS) 100 unit/mL injection Inject 70 units subcutaneously before breakfast (7AM) and 40 units subcutaneously after dinner (7PM). Indications: type 2 diabetes mellitus    insulin lispro (HUMALOG) 100 unit/mL injection To be given 15 min before meals: < 150 - None, 151-200 - 2 u, 201-250 - 4 u, 251-300 - 6 u, 301-350 - 8 u, 351-400 - 10 u, >400 - 12 u    aspirin 81 mg chewable tablet Take 1 Tab by mouth daily (with breakfast). Indications: prevention of cerebrovascular accident    levothyroxine (SYNTHROID) 100 mcg tablet Take 1 Tab by mouth Daily (before breakfast). Indications: hypothyroidism    cholecalciferol (VITAMIN D3) 1,000 unit tablet Take 2 Tabs by mouth daily. Indications: PREVENTION OF VITAMIN D DEFICIENCY    divalproex DR (DEPAKOTE) 250 mg tablet Take 250 mg by mouth nightly. Indications: BIPOLAR DISORDER IN REMISSION    insulin syringe,safetyneedle 1 mL 30 gauge x 5/16\" syrg As directed    ARIPiprazole (ABILIFY) 5 mg tablet Take 10 mg by mouth daily. Indications: BIPOLAR DISORDER IN REMISSION    albuterol (PROVENTIL VENTOLIN) 2.5 mg /3 mL (0.083 %) nebulizer solution USE 1 NEB EVERY 4 HOURS FOR WHEEZING AND SHORTNESS OF BREATH  Indications: CHRONIC OBSTRUCTIVE PULMONARY DISEASE    gabapentin (NEURONTIN) 300 mg capsule Take 300 mg by mouth daily. Indications: NEUROPATHIC PAIN    acetaminophen (TYLENOL) 325 mg tablet Take 325 mg by mouth every six (6) hours as needed for Pain. Indications: Fever, Pain    traZODone (DESYREL) 100 mg tablet Take 100 mg by mouth as needed. Indications: major depressive disorder    Nebulizer Accessories Kit Use with nebulizer machine    sertraline (ZOLOFT) 100 mg tablet Take 50 mg by mouth daily. Indications: DEPRESSION ASSOCIATED WITH BIPOLAR DISORDER    Oxygen-Air Delivery Systems by Nasal route continuous. 2 LPM via NC  Indications: Hypoxemia requiring supplementary oxygen    oxyCODONE-acetaminophen (PERCOCET) 5-325 mg per tablet Take 1 Tab by mouth every six (6) hours as needed for Pain. Max Daily Amount: 4 Tabs. Indications: Pain     No current facility-administered medications for this visit. Visit Vitals    /70    Pulse 74    Ht 5' 5\" (1.651 m)    Wt 83.9 kg (185 lb)    LMP 11/25/2012 (Exact Date)    BMI 30.79 kg/m2         Physical Exam   Constitutional: She is oriented to person, place, and time. She appears well-developed and well-nourished.    HENT:   Head: Normocephalic and atraumatic. Eyes: Conjunctivae are normal.   Neck: Neck supple. No JVD present. No tracheal deviation present. No thyromegaly present. Cardiovascular: Normal rate and regular rhythm. PMI is not displaced. Exam reveals no gallop, no S3 and no decreased pulses. No murmur heard. Pulmonary/Chest: No respiratory distress. She has decreased breath sounds. She has wheezes. She has no rales. She exhibits no tenderness. Abdominal: Soft. There is no tenderness. Musculoskeletal: She exhibits no edema (mild edema). Neurological: She is alert and oriented to person, place, and time. Skin: Skin is warm. Psychiatric: She has a normal mood and affect. CARDIOLOGY STUDIES 10/1/2012 10/1/2012 12/1/2009 12/1/2009 11/1/2009 11/1/2009   EKG Result WNL WNL - - - -   Myocardial Perfusion Scan Result - - nl scan, EF 69% nl scan, EF 69% - -   Echocardiogram - Complete Result Normal EF, Enlarged rt side Normal EF, Enlarged RT side - - EF 60% EF 60%   Some recent data might be hidden     Echo:4/2016   NORMAL GLOBAL LEFT VENTRICULAR SYSTOLIC FUNCTION. EJECTION FRACTION OF 65%. MILD DIASTOLIC DYSFUNCTION. DILATED LEFT ATRIUM. ESTIMATED PULMONARY ARTERY PRESSURE IS 39 MMHG. NO HEMODYNAMICALLY SIGNIFICANT VALVULAR PATHOLOGY. OTHER FINDINGS AS NOTED BELOW. I have personally reviewed patients ekg done at other facility. 7/2016-  I Have personally reviewed recent relevant labs available and discussed with patient  lui [facility]-7/2016  I have personally reviewed patient's records available from hospital and other providers and incorporated findings in patient care. IMAGING AND PROCEDURES-10/2016  1. Chest x-ray was done and showed top normal size of heart, stable  hyperinflation, stable chronic increased interstitial marking consistent  with interstitial lung disease. 2. Dobutamine stress test was done, reported EF of 44% with a small  reversible inferior wall defect.-medically reated  3. Echocardiogram was done and showed 55% with grade 1 diastolic dysfunction. 4. Sputum culture was done and showed a few Demetra albicans.     Assessment       ICD-10-CM UMS-[id number]    1. Diastolic CHF, chronic (HCC) I50.32 428.32      428.0     Stable. Compensated. No fluid overload. 2. CKD stage G3a/A1, GFR 45-59 and albumin creatinine ratio <30 mg/g N18.3 585. 3     Stable   3. Chronic bronchitis, unspecified chronic bronchitis type ([facility] J42 491.9     Stable. On home oxygen. 4. Type 2 diabetes mellitus with stage 3 chronic kidney disease, with long-term current use of insulin ([facility]) E11.22 250.40     N18.3 585.3     Z79.4 V58.67     Stable   5. Sarcoidosis D86.9 135     Stable. Continue home oxygen   6. Hypoxia R09.02 799.02    7. Pre-operative cardiovascular examination Z01.810 V72.81     For teeth extraction. Stable cardiac status. Okay to proceed with the moderate risk primarily related to sarcoidosis and hypoxia. ok to stop plavix for procedures  8/2018  Okay to stop Plavix for dental work. Will discontinue Plavix. She was started on Plavix in 10/2016 for abnormal dobutamine stress test and mildly abnormal troponin. At this point will continue with aspirin only. Continue statin and other medication. Medications Discontinued During This Encounter   Medication Reason    predniSONE (DELTASONE) 10 mg tablet Therapy Completed    clopidogrel (PLAVIX) 75 mg tab Therapy Completed       No orders of the defined types were placed in this encounter. Follow-up Disposition:  Return in about 6 months (around 2/9/2019) for Cleared for planned surgery, moderate risk.

## 2018-08-09 NOTE — PROGRESS NOTES
Patient didn't bring medications, verbally reviewed    1. Have you been to the ER, urgent care clinic since your last visit? Hospitalized since your last visit? No    2. Have you seen or consulted any other health care providers outside of the [facility] since your last visit? Include any pap smears or colon screening.  Yes Where: PCP Routine/

## 2018-09-29 ENCOUNTER — HOSPITAL ENCOUNTER (OUTPATIENT)
Dept: LAB | Age: 63
Discharge: HOME | End: 2018-09-29

## 2018-09-29 LAB — XX-LABCORP SPECIMEN COL,LCBCF: NORMAL

## 2018-09-29 PROCEDURE — 99001 SPECIMEN HANDLING PT-LAB: CPT | Performed by: INTERNAL MEDICINE

## 2018-10-03 ENCOUNTER — TELEPHONE (OUTPATIENT)
Dept: PULMONOLOGY | Age: 63
End: 2018-10-03

## 2018-10-03 RX ORDER — PREDNISONE 10 MG/1
TABLET ORAL
Qty: 18 TAB | Refills: 0 | Status: SHIPPED | OUTPATIENT
Start: 2018-10-03 | End: 2019-01-31 | Stop reason: SDUPTHER

## 2018-10-03 NOTE — TELEPHONE ENCOUNTER
Pt states she finished a zpak on Sunday from pcp, but she is still congested and coughing up white/beige phlegm. She thinks she needs prednisone. Please call .

## 2018-10-03 NOTE — TELEPHONE ENCOUNTER
Pt called back. She saw her PCP 1 week ago and was given Zpak for URI. Congestion and Cough. Pt took along with her Albuterol Inh and Anoro. Pt still with wheezing and some sob. Feels she needs Prednisone. Pt understands to go to ER if sob worsens.

## 2018-11-05 ENCOUNTER — OFFICE VISIT (OUTPATIENT)
Dept: PULMONOLOGY | Age: 63
End: 2018-11-05

## 2018-11-05 VITALS
HEIGHT: 65 IN | OXYGEN SATURATION: 93 % | WEIGHT: 175 LBS | HEART RATE: 93 BPM | TEMPERATURE: 98.5 F | SYSTOLIC BLOOD PRESSURE: 120 MMHG | RESPIRATION RATE: 20 BRPM | DIASTOLIC BLOOD PRESSURE: 68 MMHG | BODY MASS INDEX: 29.16 KG/M2

## 2018-11-05 DIAGNOSIS — D86.9 SARCOIDOSIS: Primary | ICD-10-CM

## 2018-11-05 DIAGNOSIS — R05.8 PRODUCTIVE COUGH: ICD-10-CM

## 2018-11-05 DIAGNOSIS — F31.9 BIPOLAR AFFECTIVE DISORDER, REMISSION STATUS UNSPECIFIED (HCC): ICD-10-CM

## 2018-11-05 DIAGNOSIS — J44.9 CHRONIC OBSTRUCTIVE PULMONARY DISEASE, UNSPECIFIED COPD TYPE (HCC): ICD-10-CM

## 2018-11-05 DIAGNOSIS — E03.9 HYPOTHYROIDISM, UNSPECIFIED TYPE: ICD-10-CM

## 2018-11-05 DIAGNOSIS — J44.9 COPD, SEVERE (HCC): ICD-10-CM

## 2018-11-05 DIAGNOSIS — Z99.81 HYPOXEMIA REQUIRING SUPPLEMENTAL OXYGEN: ICD-10-CM

## 2018-11-05 DIAGNOSIS — R09.02 HYPOXEMIA REQUIRING SUPPLEMENTAL OXYGEN: ICD-10-CM

## 2018-11-05 RX ORDER — AZITHROMYCIN 250 MG/1
TABLET, FILM COATED ORAL
Qty: 6 TAB | Refills: 0 | Status: SHIPPED | OUTPATIENT
Start: 2018-11-05 | End: 2019-03-22 | Stop reason: ALTCHOICE

## 2018-11-05 NOTE — PROGRESS NOTES
Chief Complaint   Patient presents with    COPD     CT done 2/6    Sarcoidosis     1. Have you been to the ER, urgent care clinic since your last visit? Hospitalized since your last visit? No    2. Have you seen or consulted any other health care providers outside of the [address] [place] [address] since your last visit? Include any pap smears or colon screening.  No

## 2018-11-05 NOTE — PROGRESS NOTES
HISTORY OF PRESENT ILLNESS  Sneha Pryor is a 59 y.o. female. History of Sarcoidosis and COPD, last FEV 1 in 2012 was 51%. Pt was recently on Prednisone taper for exacerbation. She has been off Prednisone but now complains of a cough productive of yellowish phlegm for 2 weeks. She denies fever. Uses O2 despite smoking. Pt reports less tripping and falling over her O2 tubing. Pt with history of CKD but reports improvement in renal function per nephrology. Pt reports improved glycemic control after significant weight loss, she is on a strict diet due to gout. Pt admits to smoking 1 pack of cigarettes daily but no further use of recreational drugs. Shortness of Breath   The history is provided by the patient. This is a chronic problem. The problem occurs frequently. Episode onset: years. The problem has been gradually improving. Associated symptoms include cough, sputum production ( whitish) and wheezing. Pertinent negatives include no fever, no headaches, no sore throat, no ear pain, no neck pain, no hemoptysis, no PND, no chest pain, no vomiting, no abdominal pain, no rash, no leg pain and no claudication. Orthopnea: mild  Leg swelling: chronic. Treatments tried: Anoro and Albuterol prn. The treatment provided moderate relief. Associated medical issues include COPD. Cough with sputum   Associated symptoms include shortness of breath. Pertinent negatives include no chest pain, no abdominal pain and no headaches. COPD   Associated symptoms include shortness of breath. Pertinent negatives include no chest pain, no abdominal pain and no headaches. Review of Systems   Constitutional: Positive for malaise/fatigue. Negative for chills, diaphoresis, fever and weight loss. HENT: Negative for congestion, ear discharge, ear pain, hearing loss, nosebleeds, sinus pain, sore throat and tinnitus. Eyes: Negative for blurred vision, double vision, photophobia, pain, discharge and redness.    Respiratory: Positive for cough, sputum production ( whitish), shortness of breath and wheezing. Negative for hemoptysis and stridor. Cardiovascular: Negative for chest pain, palpitations, claudication and PND. Orthopnea: mild  Leg swelling: chronic. Gastrointestinal: Negative for abdominal pain, blood in stool, constipation, diarrhea, heartburn, melena, nausea and vomiting. Genitourinary: Negative for dysuria, flank pain, frequency, hematuria and urgency. Musculoskeletal: Positive for back pain and joint pain. Negative for falls, myalgias and neck pain. Skin: Negative for itching and rash. Neurological: Negative for dizziness, tingling, tremors, sensory change, speech change, focal weakness, seizures, loss of consciousness, weakness and headaches. Endo/Heme/Allergies: Negative for environmental allergies and polydipsia. Does not bruise/bleed easily. Psychiatric/Behavioral: Positive for depression. Negative for hallucinations, memory loss, substance abuse and suicidal ideas. The patient is nervous/anxious. The patient does not have insomnia.       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 congestive heart failure (HCC)     Better controlled     Bipolar affective disorder ([facility] [address] 12/5/2012    Cellulitis of right forearm 5/4/2017    Chronic back pain     Chronic lung disease     Chronic obstructive pulmonary disease [facility]COPD) ([facility])     SOB, on paula O2 Recent admission with psychosis     CKD stage G3a/A1, GFR 45-59 and albumin creatinine ratio <30 mg/g ([facility]) 5/11/2017    Urine microalbumin/Creatinine ratio (5/11/2017) = 9 mg/g    Contusion of left elbow 5/4/2017    Depression     Diabetes mellitus ([address]     Diabetic neuropathy associated with type 2 diabetes mellitus ([facility] [address]     Diastolic dysfunction without heart failure     Stable on diuretics     Falls frequently     Gastroesophageal reflux disease with hiatal hernia     Generalized osteoarthritis of multiple sites     History of acute renal failure 5/31/2013    History of back injury     jumped out of second story window     History of hepatitis C     treated    History of penicillin allergy     History of vitamin D deficiency 5/10/2017    Hyperuricemia 5/26/2017    Hypothyroidism     Hypoxemia requiring supplemental oxygen 12/29/2014    Intravenous drug user 5/2/2017    Memory difficulty     Mixed connective tissue disease ([facility] 5/10/2017    Obesity, Class I, BMI 30-34.9     Olecranon bursitis of right elbow 5/4/2017    Recurrent genital herpes 5/31/2013    Right Achilles tendinitis 5/2/2017    Sarcoidosis     Sickle cell trait (HCC)     Type 2 diabetes with stage 3 chronic kidney disease GFR 30-59 ([facility]     Urge urinary incontinence 5/10/2017    Venous insufficiency     Wears glasses      Current Outpatient Medications on File Prior to Visit   Medication Sig Dispense Refill    albuterol (PROAIR HFA) 90 mcg/actuation inhaler Take 2 Puffs by inhalation every four (4) hours as needed for Wheezing. 1 Inhaler 5    rosuvastatin (CRESTOR) 5 mg tablet TAKE ONE TABLET NIGHTLY 90 Tab 3    oxybutynin (DITROPAN) 5 mg tablet Take 5 mg by mouth two (2) times a day.  metOLazone (ZAROXOLYN) 2.5 mg tablet Take 2.5 mg by mouth every fourty-eight (48) hours.  allopurinol (ZYLOPRIM) 100 mg tablet Take  by mouth daily.  ANORO ELLIPTA 62.5-25 mcg/actuation inhaler INHALE 1 PUFF DAILY 1 Inhaler 5    traMADol (ULTRAM) 50 mg tablet       famotidine (PEPCID) 20 mg tablet Take 1 Tab by mouth daily. Indications: PREVENTION OF STRESS ULCER 15 Tab 0    insulin glargine (LANTUS) 100 unit/mL injection Inject 70 units subcutaneously before breakfast (7AM) and 40 units subcutaneously after dinner (7PM). Indications: type 2 diabetes mellitus 1 Vial 0    insulin lispro (HUMALOG) 100 unit/mL injection To be given 15 min before meals: < 150 - None, 151-200 - 2 u, 201-250 - 4 u, 251-300 - 6 u, 301-350 - 8 u, 351-400 - 10 u, >400 - 12 u 1 Vial 0    aspirin 81 mg chewable tablet Take 1 Tab by mouth daily (with breakfast). Indications: prevention of cerebrovascular accident 13 Tab 0    levothyroxine (SYNTHROID) 100 mcg tablet Take 1 Tab by mouth Daily (before breakfast). Indications: hypothyroidism 15 Tab 0    cholecalciferol (VITAMIN D3) 1,000 unit tablet Take 2 Tabs by mouth daily. Indications: PREVENTION OF VITAMIN D DEFICIENCY 30 Tab 0    divalproex DR (DEPAKOTE) 250 mg tablet Take 250 mg by mouth nightly. Indications: BIPOLAR DISORDER IN REMISSION      insulin syringe,safetyneedle 1 mL 30 gauge x 5/16\" syrg As directed 50 Each 0    ARIPiprazole (ABILIFY) 5 mg tablet Take 10 mg by mouth daily. Indications: BIPOLAR DISORDER IN REMISSION      albuterol (PROVENTIL VENTOLIN) 2.5 mg /3 mL (0.083 %) nebulizer solution USE 1 NEB EVERY 4 HOURS FOR WHEEZING AND SHORTNESS OF BREATH  Indications: CHRONIC OBSTRUCTIVE PULMONARY DISEASE 2 Package 3    gabapentin (NEURONTIN) 300 mg capsule Take 300 mg by mouth daily. Indications: NEUROPATHIC PAIN      acetaminophen (TYLENOL) 325 mg tablet Take 325 mg by mouth every six (6) hours as needed for Pain. Indications: Fever, Pain      traZODone (DESYREL) 100 mg tablet Take 100 mg by mouth as needed. Indications: major depressive disorder      Nebulizer Accessories Kit Use with nebulizer machine 1 Kit prn    sertraline (ZOLOFT) 100 mg tablet Take 50 mg by mouth daily. Indications: DEPRESSION ASSOCIATED WITH BIPOLAR DISORDER      Oxygen-Air Delivery Systems by Nasal route continuous.  2 LPM via NC  Indications: Hypoxemia requiring supplementary oxygen      predniSONE (DELTASONE) 10 mg tablet 30 mg po dailyx 3 days 20 mg po daily x 3 days 10 mg po daily x3 days 18 Tab 0    oxyCODONE-acetaminophen (PERCOCET) 5-325 mg per tablet Take 1 Tab by mouth every six (6) hours as needed for Pain. Max Daily Amount: 4 Tabs. Indications: Pain 15 Tab 0     No current facility-administered medications on file prior to visit. Allergies   Allergen Reactions    Moxifloxacin Rash    Pcn [Penicillins] Swelling    Sulfa (Sulfonamide Antibiotics) Swelling     Social History     Socioeconomic History    Marital status: SINGLE     Spouse name: Not on file    Number of children: Not on file    Years of education: Not on file    Highest education level: Not on file   Social Needs    Financial resource strain: Not on file    Food insecurity - worry: Not on file    Food insecurity - inability: Not on file    Transportation needs - medical: Not on file   [employer] needs - non-medical: Not on file   Occupational History    Occupation: Not employed     Employer: NOT EMPLOYED   Tobacco Use    Smoking status: Current Every Day Smoker     Packs/day: 0.50     Years: 30.00     Pack years: 15.00     Types: Cigarettes     Start date: 12/2/1969    Smokeless tobacco: Never Used   Substance and Sexual Activity    Alcohol use: No    Drug use: No     Comment: +Cocaine Screen 2013    Sexual activity: Not on file     Comment: 2014   Other Topics Concern    Not on file   Social History Narrative    Lives with 15year old son. Blood pressure 120/68, pulse 93, temperature 98.5 °F (36.9 °C), temperature source Oral, resp. rate 20, height 5' 5\" (1.651 m), weight 79.4 kg (175 lb), last menstrual period 11/25/2012, SpO2 93 %. Physical Exam   Constitutional: She is oriented to person, place, and time. She appears well-developed and well-nourished. No distress. HENT:   Head: Normocephalic. Nose: Nose normal.   Mouth/Throat: Oropharynx is clear and moist. No oropharyngeal exudate. Eyes: Conjunctivae are normal. Pupils are equal, round, and reactive to light. Right eye exhibits no discharge.  Left eye exhibits no discharge. No scleral icterus. Neck: No JVD present. No tracheal deviation present. No thyromegaly present. Cardiovascular: Normal rate, regular rhythm and intact distal pulses. Exam reveals no gallop. No murmur heard. Pulmonary/Chest: Effort normal. No stridor. No respiratory distress. Wheezes: faint right greater than left upper lung fields  She has no rales. She exhibits no tenderness. Distant breath sounds   Abdominal: Soft. She exhibits no mass. There is no tenderness. There is no rebound. Musculoskeletal: She exhibits no tenderness. Edema: 1+ both ankles. Lymphadenopathy:     She has no cervical adenopathy. Neurological: She is alert and oriented to person, place, and time. Skin: Skin is warm and dry. No rash noted. She is not diaphoretic. No erythema. Psychiatric: She has a normal mood and affect. Her behavior is normal. Judgment and thought content normal.       CXR ANDRE POWER personal review:  Hyperinflated, increased interstitial prominence unchanged from 1/26/2018  CT Results (most recent):  Results from [place] encounter on 02/06/18   CT CHEST WO CONT    Narrative Noncontrast CT chest    CPT code 57876    CLINICAL HISTORY: Abnormal chest x-ray in a smoker    TECHNIQUE: 5 mm helical MDCT scan of the chest without contrast. Sagittal and  coronal reformations then created with original data set. All CT scans at this  facility are performed using dose optimization techniques as appropriate to a  performed exam, to include automated exposure control, adjustment of the mA  and/or kV according to patient's size (including appropriate matching for site  specific examinations), or use of iterative reconstruction technique. COMPARISON: 7/27/2016, chest x-ray 1/26/2018    FINDINGS: Lungs: Panacinar emphysema upper lobes. Fibrotic scarring left apex. Lung cysts both lower lobes. Honeycombing right lung base, with worsening since  2016. Prasanth Fatima  5 mm nodule right lower lobe adjacent to pleura (34) stable since  7/27/2016. Airways: Peribronchial thickening around the elan. Bony skeleton: Old healed left anterolateral rib fractures; old pseudoarthrosis  right posterior 11th rib fracture. Healing left posterolateral fifth rib  fracture (20), new since prior study. New marked compression deformity T5. Surrounding haziness in the posterior mediastinal fat. No bony spinal stenosis  as a result. Otherwise no loss of vertebral body height. Heart and pericardium: Mild calcifications mitral annulus, left coronary artery. Great vessels: Unremarkable for age. Lymph nodes: Small lymph nodes in the middle mediastinum, not pathologically  enlarged. Pleural space: No effusions. Upper abdomen: Stable well-defined 2.4 cm hypodensity superiorly in the spleen. A 1.4 cm hypodensity inferiorly in the spleen not seen previously, measuring  water attenuation. Remainder included solid organs and hollow viscera are  unremarkable. Impression IMPRESSION: Emphysema and fibrosis at the lung bases, worsened particularly on  the right since 2016. No new infiltrate. 5 mm right lower lobe nodule stable since prior study in 2016, probably benign. New compression fracture T5 and new healing left posterolateral fifth rib  fracture. Splenic cysts including a new one inferiorly. 6 minute walk test: reduced 6 MWD with desaturation relieved by supplemental O2  ASSESSMENT and PLAN  Encounter Diagnoses   Name Primary?  Sarcoidosis Yes    COPD, severe ([address]     Hypoxemia requiring supplemental oxygen     Hypothyroidism, unspecified type     Bipolar affective disorder, remission status unspecified (HCC)     Chronic obstructive pulmonary disease, unspecified COPD type ([address]     Productive cough        Continue current medications including Anoro. Titrate FiO2 to saturation greater than 90%. Start Zithromax Z pack, instructions given to pt and Rx sent.   Strongly counseled on need for smoking cessation. Flu vaccine to be given in 2 weeks after completion of antibiotic therapy  Reviewed CXR results with pt.   RTC 2 weeks

## 2018-12-28 RX ORDER — ALBUTEROL SULFATE 90 UG/1
AEROSOL, METERED RESPIRATORY (INHALATION)
Qty: 8.5 INHALER | Refills: 4 | Status: SHIPPED | OUTPATIENT
Start: 2018-12-28 | End: 2019-01-31 | Stop reason: SDUPTHER

## 2019-01-01 ENCOUNTER — HOSPITAL ENCOUNTER (INPATIENT)
Age: 64
LOS: 29 days | Discharge: HOME HEALTH | DRG: 862 | End: 2020-01-14
Attending: INTERNAL MEDICINE | Admitting: INTERNAL MEDICINE
Payer: MEDICAID

## 2019-01-01 ENCOUNTER — OFFICE VISIT (OUTPATIENT)
Dept: CARDIOLOGY CLINIC | Age: 64
End: 2019-01-01

## 2019-01-01 ENCOUNTER — APPOINTMENT (OUTPATIENT)
Dept: MRI IMAGING | Age: 64
DRG: 304 | End: 2019-01-01
Attending: EMERGENCY MEDICINE
Payer: MEDICAID

## 2019-01-01 ENCOUNTER — APPOINTMENT (OUTPATIENT)
Dept: GENERAL RADIOLOGY | Age: 64
End: 2019-01-01
Attending: EMERGENCY MEDICINE
Payer: MEDICAID

## 2019-01-01 ENCOUNTER — HOSPITAL ENCOUNTER (OUTPATIENT)
Dept: NON INVASIVE DIAGNOSTICS | Age: 64
Discharge: HOME | End: 2019-10-30
Attending: INTERNAL MEDICINE
Payer: MEDICAID

## 2019-01-01 ENCOUNTER — TELEPHONE (OUTPATIENT)
Dept: ORTHOPEDIC SURGERY | Age: 64
End: 2019-01-01

## 2019-01-01 ENCOUNTER — APPOINTMENT (OUTPATIENT)
Dept: MRI IMAGING | Age: 64
DRG: 304 | End: 2019-01-01
Attending: PHYSICIAN ASSISTANT
Payer: MEDICAID

## 2019-01-01 ENCOUNTER — APPOINTMENT (OUTPATIENT)
Dept: GENERAL RADIOLOGY | Age: 64
DRG: 304 | End: 2019-01-01
Attending: PHYSICIAN ASSISTANT
Payer: MEDICAID

## 2019-01-01 ENCOUNTER — HOSPITAL ENCOUNTER (OUTPATIENT)
Dept: GENERAL RADIOLOGY | Age: 64
Discharge: HOME | End: 2019-11-07
Payer: MEDICAID

## 2019-01-01 ENCOUNTER — HOSPITAL ENCOUNTER (EMERGENCY)
Age: 64
Discharge: HOME | End: 2019-11-29
Attending: EMERGENCY MEDICINE
Payer: MEDICAID

## 2019-01-01 ENCOUNTER — HOSPITAL ENCOUNTER (EMERGENCY)
Age: 64
Discharge: HOME | End: 2019-12-07
Attending: EMERGENCY MEDICINE
Payer: MEDICAID

## 2019-01-01 ENCOUNTER — HOSPITAL ENCOUNTER (EMERGENCY)
Age: 64
Discharge: HOME | End: 2019-10-17
Attending: EMERGENCY MEDICINE
Payer: MEDICAID

## 2019-01-01 ENCOUNTER — HOSPITAL ENCOUNTER (OUTPATIENT)
Dept: LAB | Age: 64
Discharge: HOME | End: 2019-09-30

## 2019-01-01 ENCOUNTER — ANESTHESIA EVENT (OUTPATIENT)
Dept: EMERGENCY DEPT | Age: 64
DRG: 304 | End: 2019-01-01
Payer: MEDICAID

## 2019-01-01 ENCOUNTER — APPOINTMENT (OUTPATIENT)
Dept: GENERAL RADIOLOGY | Age: 64
End: 2019-01-01
Attending: PHYSICIAN ASSISTANT
Payer: MEDICAID

## 2019-01-01 ENCOUNTER — HOSPITAL ENCOUNTER (OUTPATIENT)
Dept: ULTRASOUND IMAGING | Age: 64
Discharge: HOME | End: 2019-10-10
Payer: MEDICAID

## 2019-01-01 ENCOUNTER — TELEPHONE (OUTPATIENT)
Dept: PULMONOLOGY | Age: 64
End: 2019-01-01

## 2019-01-01 ENCOUNTER — APPOINTMENT (OUTPATIENT)
Dept: CT IMAGING | Age: 64
DRG: 304 | End: 2019-01-01
Attending: ORTHOPAEDIC SURGERY
Payer: MEDICAID

## 2019-01-01 ENCOUNTER — APPOINTMENT (OUTPATIENT)
Dept: GENERAL RADIOLOGY | Age: 64
DRG: 304 | End: 2019-01-01
Attending: ANESTHESIOLOGY
Payer: MEDICAID

## 2019-01-01 ENCOUNTER — APPOINTMENT (OUTPATIENT)
Dept: NON INVASIVE DIAGNOSTICS | Age: 64
DRG: 304 | End: 2019-01-01
Attending: ORTHOPAEDIC SURGERY
Payer: MEDICAID

## 2019-01-01 ENCOUNTER — OFFICE VISIT (OUTPATIENT)
Dept: PULMONOLOGY | Age: 64
End: 2019-01-01

## 2019-01-01 ENCOUNTER — ANESTHESIA (OUTPATIENT)
Dept: EMERGENCY DEPT | Age: 64
DRG: 304 | End: 2019-01-01
Payer: MEDICAID

## 2019-01-01 ENCOUNTER — HOSPITAL ENCOUNTER (OUTPATIENT)
Dept: MAMMOGRAPHY | Age: 64
Discharge: HOME | End: 2019-10-10
Payer: MEDICAID

## 2019-01-01 ENCOUNTER — APPOINTMENT (OUTPATIENT)
Dept: GENERAL RADIOLOGY | Age: 64
DRG: 304 | End: 2019-01-01
Attending: ORTHOPAEDIC SURGERY
Payer: MEDICAID

## 2019-01-01 ENCOUNTER — HOSPITAL ENCOUNTER (INPATIENT)
Age: 64
LOS: 5 days | Discharge: NURSING, REHAB OR LONG-TERM CARE | DRG: 304 | End: 2019-12-16
Attending: EMERGENCY MEDICINE | Admitting: INTERNAL MEDICINE
Payer: MEDICAID

## 2019-01-01 ENCOUNTER — ANESTHESIA EVENT (OUTPATIENT)
Dept: SURGERY | Age: 64
DRG: 304 | End: 2019-01-01
Payer: MEDICAID

## 2019-01-01 ENCOUNTER — HOSPITAL ENCOUNTER (OUTPATIENT)
Dept: LAB | Age: 64
Discharge: HOME | End: 2019-11-06

## 2019-01-01 ENCOUNTER — APPOINTMENT (OUTPATIENT)
Dept: CT IMAGING | Age: 64
DRG: 304 | End: 2019-01-01
Attending: EMERGENCY MEDICINE
Payer: MEDICAID

## 2019-01-01 ENCOUNTER — ANESTHESIA (OUTPATIENT)
Dept: SURGERY | Age: 64
DRG: 304 | End: 2019-01-01
Payer: MEDICAID

## 2019-01-01 VITALS
DIASTOLIC BLOOD PRESSURE: 72 MMHG | BODY MASS INDEX: 28.32 KG/M2 | WEIGHT: 170 LBS | SYSTOLIC BLOOD PRESSURE: 111 MMHG | HEIGHT: 65 IN | HEART RATE: 61 BPM

## 2019-01-01 VITALS
SYSTOLIC BLOOD PRESSURE: 134 MMHG | BODY MASS INDEX: 30.06 KG/M2 | HEIGHT: 65 IN | OXYGEN SATURATION: 96 % | TEMPERATURE: 97.7 F | RESPIRATION RATE: 20 BRPM | WEIGHT: 180.4 LBS | DIASTOLIC BLOOD PRESSURE: 81 MMHG | HEART RATE: 83 BPM

## 2019-01-01 VITALS
HEART RATE: 74 BPM | TEMPERATURE: 98 F | SYSTOLIC BLOOD PRESSURE: 110 MMHG | DIASTOLIC BLOOD PRESSURE: 70 MMHG | RESPIRATION RATE: 21 BRPM | WEIGHT: 175 LBS | OXYGEN SATURATION: 92 % | HEIGHT: 65 IN | BODY MASS INDEX: 29.16 KG/M2

## 2019-01-01 VITALS
TEMPERATURE: 99.1 F | OXYGEN SATURATION: 94 % | HEART RATE: 82 BPM | HEIGHT: 65 IN | DIASTOLIC BLOOD PRESSURE: 81 MMHG | BODY MASS INDEX: 29.99 KG/M2 | RESPIRATION RATE: 20 BRPM | WEIGHT: 180 LBS | SYSTOLIC BLOOD PRESSURE: 150 MMHG

## 2019-01-01 VITALS
HEIGHT: 65 IN | DIASTOLIC BLOOD PRESSURE: 93 MMHG | BODY MASS INDEX: 29.99 KG/M2 | TEMPERATURE: 98.1 F | WEIGHT: 180 LBS | HEART RATE: 89 BPM | RESPIRATION RATE: 18 BRPM | OXYGEN SATURATION: 98 % | SYSTOLIC BLOOD PRESSURE: 138 MMHG

## 2019-01-01 VITALS
OXYGEN SATURATION: 100 % | DIASTOLIC BLOOD PRESSURE: 80 MMHG | TEMPERATURE: 97.6 F | HEART RATE: 112 BPM | RESPIRATION RATE: 24 BRPM | BODY MASS INDEX: 29.16 KG/M2 | SYSTOLIC BLOOD PRESSURE: 151 MMHG | HEIGHT: 65 IN | WEIGHT: 175 LBS

## 2019-01-01 VITALS
DIASTOLIC BLOOD PRESSURE: 70 MMHG | HEIGHT: 65 IN | BODY MASS INDEX: 29.16 KG/M2 | WEIGHT: 175 LBS | SYSTOLIC BLOOD PRESSURE: 110 MMHG

## 2019-01-01 VITALS
TEMPERATURE: 98.7 F | HEART RATE: 80 BPM | RESPIRATION RATE: 19 BRPM | DIASTOLIC BLOOD PRESSURE: 65 MMHG | SYSTOLIC BLOOD PRESSURE: 144 MMHG | WEIGHT: 178.6 LBS | OXYGEN SATURATION: 100 % | BODY MASS INDEX: 29.76 KG/M2 | HEIGHT: 65 IN

## 2019-01-01 DIAGNOSIS — Z99.81 HYPOXEMIA REQUIRING SUPPLEMENTAL OXYGEN: Chronic | ICD-10-CM

## 2019-01-01 DIAGNOSIS — R09.02 HYPOXEMIA REQUIRING SUPPLEMENTAL OXYGEN: ICD-10-CM

## 2019-01-01 DIAGNOSIS — J44.1 COPD EXACERBATION (HCC): Primary | ICD-10-CM

## 2019-01-01 DIAGNOSIS — E11.22 TYPE 2 DIABETES MELLITUS WITH STAGE 3 CHRONIC KIDNEY DISEASE, WITH LONG-TERM CURRENT USE OF INSULIN (HCC): Chronic | ICD-10-CM

## 2019-01-01 DIAGNOSIS — D86.0 PULMONARY SARCOIDOSIS (HCC): ICD-10-CM

## 2019-01-01 DIAGNOSIS — R92.8 ABNORMAL MAMMOGRAM: ICD-10-CM

## 2019-01-01 DIAGNOSIS — Z99.81 HYPOXEMIA REQUIRING SUPPLEMENTAL OXYGEN: ICD-10-CM

## 2019-01-01 DIAGNOSIS — N18.31 CKD STAGE G3A/A1, GFR 45-59 AND ALBUMIN CREATININE RATIO <30 MG/G (HCC): ICD-10-CM

## 2019-01-01 DIAGNOSIS — G89.29 CHRONIC MIDLINE LOW BACK PAIN WITHOUT SCIATICA: Chronic | ICD-10-CM

## 2019-01-01 DIAGNOSIS — D86.9 SARCOIDOSIS: Primary | ICD-10-CM

## 2019-01-01 DIAGNOSIS — J44.1 COPD EXACERBATION (HCC): ICD-10-CM

## 2019-01-01 DIAGNOSIS — N39.41 URGE URINARY INCONTINENCE: Chronic | ICD-10-CM

## 2019-01-01 DIAGNOSIS — K44.9 GASTROESOPHAGEAL REFLUX DISEASE WITH HIATAL HERNIA: Chronic | ICD-10-CM

## 2019-01-01 DIAGNOSIS — Z79.4 TYPE 2 DIABETES MELLITUS WITH STAGE 3 CHRONIC KIDNEY DISEASE, WITH LONG-TERM CURRENT USE OF INSULIN (HCC): Chronic | ICD-10-CM

## 2019-01-01 DIAGNOSIS — M54.50 CHRONIC MIDLINE LOW BACK PAIN WITHOUT SCIATICA: Chronic | ICD-10-CM

## 2019-01-01 DIAGNOSIS — J44.9 CHRONIC OBSTRUCTIVE PULMONARY DISEASE, UNSPECIFIED COPD TYPE (HCC): Chronic | ICD-10-CM

## 2019-01-01 DIAGNOSIS — M10.9 ACUTE GOUT OF LEFT ANKLE, UNSPECIFIED CAUSE: ICD-10-CM

## 2019-01-01 DIAGNOSIS — J44.1 ACUTE EXACERBATION OF CHRONIC OBSTRUCTIVE PULMONARY DISEASE (COPD) (HCC): Primary | ICD-10-CM

## 2019-01-01 DIAGNOSIS — Z98.1 STATUS POST LAMINECTOMY WITH SPINAL FUSION: Primary | ICD-10-CM

## 2019-01-01 DIAGNOSIS — M10.9 ACUTE GOUT INVOLVING TOE OF LEFT FOOT, UNSPECIFIED CAUSE: ICD-10-CM

## 2019-01-01 DIAGNOSIS — J43.9 PULMONARY EMPHYSEMA, UNSPECIFIED EMPHYSEMA TYPE (HCC): ICD-10-CM

## 2019-01-01 DIAGNOSIS — N18.30 TYPE 2 DIABETES MELLITUS WITH STAGE 3 CHRONIC KIDNEY DISEASE, WITH LONG-TERM CURRENT USE OF INSULIN (HCC): Chronic | ICD-10-CM

## 2019-01-01 DIAGNOSIS — J44.9 CHRONIC OBSTRUCTIVE PULMONARY DISEASE, UNSPECIFIED COPD TYPE (HCC): Primary | ICD-10-CM

## 2019-01-01 DIAGNOSIS — E78.5 HYPERLIPIDEMIA, UNSPECIFIED HYPERLIPIDEMIA TYPE: ICD-10-CM

## 2019-01-01 DIAGNOSIS — I27.20 PULMONARY HTN (HCC): ICD-10-CM

## 2019-01-01 DIAGNOSIS — F41.9 ANXIETY: Chronic | ICD-10-CM

## 2019-01-01 DIAGNOSIS — F32.A DEPRESSION, UNSPECIFIED DEPRESSION TYPE: Chronic | ICD-10-CM

## 2019-01-01 DIAGNOSIS — K59.00 CONSTIPATION, UNSPECIFIED CONSTIPATION TYPE: Primary | ICD-10-CM

## 2019-01-01 DIAGNOSIS — Z87.81 HISTORY OF FRACTURE DUE TO FALL: ICD-10-CM

## 2019-01-01 DIAGNOSIS — M54.50 CHRONIC MIDLINE LOW BACK PAIN, UNSPECIFIED WHETHER SCIATICA PRESENT: ICD-10-CM

## 2019-01-01 DIAGNOSIS — F11.93 NARCOTIC WITHDRAWAL (HCC): ICD-10-CM

## 2019-01-01 DIAGNOSIS — E87.6 HYPOKALEMIA: ICD-10-CM

## 2019-01-01 DIAGNOSIS — E87.8 HYPOCHLOREMIA: ICD-10-CM

## 2019-01-01 DIAGNOSIS — D86.9 SARCOIDOSIS: ICD-10-CM

## 2019-01-01 DIAGNOSIS — S22.009A CLOSED FRACTURE OF THORACIC VERTEBRA, UNSPECIFIED FRACTURE MORPHOLOGY, UNSPECIFIED THORACIC VERTEBRAL LEVEL, INITIAL ENCOUNTER (HCC): ICD-10-CM

## 2019-01-01 DIAGNOSIS — S22.000A COMPRESSION FRACTURE OF BODY OF THORACIC VERTEBRA (HCC): ICD-10-CM

## 2019-01-01 DIAGNOSIS — G89.29 CHRONIC MIDLINE LOW BACK PAIN, UNSPECIFIED WHETHER SCIATICA PRESENT: ICD-10-CM

## 2019-01-01 DIAGNOSIS — J96.11 CHRONIC RESPIRATORY FAILURE WITH HYPOXIA (HCC): Chronic | ICD-10-CM

## 2019-01-01 DIAGNOSIS — E87.1 HYPONATREMIA: ICD-10-CM

## 2019-01-01 DIAGNOSIS — D62 ACUTE BLOOD LOSS AS CAUSE OF POSTOPERATIVE ANEMIA: ICD-10-CM

## 2019-01-01 DIAGNOSIS — R09.02 HYPOXEMIA REQUIRING SUPPLEMENTAL OXYGEN: Chronic | ICD-10-CM

## 2019-01-01 DIAGNOSIS — Z87.828 HISTORY OF BACK INJURY: ICD-10-CM

## 2019-01-01 DIAGNOSIS — Z78.9 IMPAIRED MOBILITY AND ADLS: ICD-10-CM

## 2019-01-01 DIAGNOSIS — J44.1 ACUTE EXACERBATION OF CHRONIC OBSTRUCTIVE PULMONARY DISEASE (COPD) (HCC): ICD-10-CM

## 2019-01-01 DIAGNOSIS — R94.39 ABNORMAL NUCLEAR STRESS TEST: ICD-10-CM

## 2019-01-01 DIAGNOSIS — K21.9 GASTROESOPHAGEAL REFLUX DISEASE WITH HIATAL HERNIA: Chronic | ICD-10-CM

## 2019-01-01 DIAGNOSIS — R76.11 POSITIVE PPD: ICD-10-CM

## 2019-01-01 DIAGNOSIS — D86.9 SARCOIDOSIS: Chronic | ICD-10-CM

## 2019-01-01 DIAGNOSIS — G95.20 SPINAL CORD COMPRESSION (HCC): ICD-10-CM

## 2019-01-01 DIAGNOSIS — E66.9 OBESITY (BMI 30.0-34.9): ICD-10-CM

## 2019-01-01 DIAGNOSIS — D86.0 SARCOIDOSIS OF LUNG (HCC): ICD-10-CM

## 2019-01-01 DIAGNOSIS — F31.9 BIPOLAR AFFECTIVE DISORDER, REMISSION STATUS UNSPECIFIED (HCC): Chronic | ICD-10-CM

## 2019-01-01 DIAGNOSIS — I50.32 DIASTOLIC CHF, CHRONIC (HCC): ICD-10-CM

## 2019-01-01 DIAGNOSIS — I10 ESSENTIAL HYPERTENSION: ICD-10-CM

## 2019-01-01 DIAGNOSIS — Z74.09 IMPAIRED MOBILITY AND ADLS: ICD-10-CM

## 2019-01-01 DIAGNOSIS — J84.9 INTERSTITIAL LUNG DISEASE (HCC): ICD-10-CM

## 2019-01-01 DIAGNOSIS — M1A.00X0 IDIOPATHIC CHRONIC GOUT WITHOUT TOPHUS, UNSPECIFIED SITE: Chronic | ICD-10-CM

## 2019-01-01 DIAGNOSIS — R29.898 WEAKNESS OF BOTH LOWER EXTREMITIES: Primary | ICD-10-CM

## 2019-01-01 DIAGNOSIS — J18.9 ATYPICAL PNEUMONIA: Primary | ICD-10-CM

## 2019-01-01 DIAGNOSIS — E78.5 HYPERLIPIDEMIA, UNSPECIFIED HYPERLIPIDEMIA TYPE: Primary | ICD-10-CM

## 2019-01-01 DIAGNOSIS — N18.9 CHRONIC KIDNEY DISEASE, UNSPECIFIED CKD STAGE: ICD-10-CM

## 2019-01-01 DIAGNOSIS — E03.9 HYPOTHYROIDISM, UNSPECIFIED TYPE: Chronic | ICD-10-CM

## 2019-01-01 LAB
ABO + RH BLD: NORMAL
ALBUMIN SERPL-MCNC: 2.6 G/DL (ref 3.4–5)
ALBUMIN SERPL-MCNC: 3.1 G/DL (ref 3.4–5)
ALBUMIN SERPL-MCNC: 4.2 G/DL (ref 3.6–4.8)
ALBUMIN/CREAT UR: 24.7 MG/G CREAT (ref 0–30)
ALBUMIN/GLOB SERPL: 0.6 {RATIO} (ref 0.8–1.7)
ALBUMIN/GLOB SERPL: 0.6 {RATIO} (ref 0.8–1.7)
ALBUMIN/GLOB SERPL: 1.1 {RATIO} (ref 1.2–2.2)
ALP SERPL-CCNC: 108 U/L (ref 45–117)
ALP SERPL-CCNC: 67 IU/L (ref 39–117)
ALP SERPL-CCNC: 93 U/L (ref 45–117)
ALT SERPL-CCNC: 10 IU/L (ref 0–32)
ALT SERPL-CCNC: 23 U/L (ref 13–56)
ALT SERPL-CCNC: 32 U/L (ref 13–56)
ANION GAP SERPL CALC-SCNC: 3 MMOL/L (ref 3–18)
ANION GAP SERPL CALC-SCNC: 5 MMOL/L (ref 3–18)
ANION GAP SERPL CALC-SCNC: 6 MMOL/L (ref 3–18)
ANION GAP SERPL CALC-SCNC: 6 MMOL/L (ref 3–18)
ANION GAP SERPL CALC-SCNC: 7 MMOL/L (ref 3–18)
ANTIGENS PRESENT RBC DONR: NORMAL
ANTIGENS PRESENT RBC DONR: NORMAL
APPEARANCE UR: CLEAR
APPEARANCE UR: CLEAR
AST SERPL-CCNC: 19 U/L (ref 10–38)
AST SERPL-CCNC: 20 IU/L (ref 0–40)
AST SERPL-CCNC: 27 U/L (ref 10–38)
ATRIAL RATE: 100 BPM
ATRIAL RATE: 71 BPM
BACTERIA SPEC ANAEROBE CULT: NORMAL
BACTERIA SPEC ANAEROBE CULT: NORMAL
BACTERIA SPEC CULT: NORMAL
BACTERIA URNS QL MICRO: ABNORMAL /HPF
BASOPHILS # BLD AUTO: 0.1 X10E3/UL (ref 0–0.2)
BASOPHILS # BLD: 0 K/UL (ref 0–0.1)
BASOPHILS NFR BLD AUTO: 1 %
BASOPHILS NFR BLD: 0 % (ref 0–2)
BILIRUB SERPL-MCNC: 0.5 MG/DL (ref 0–1.2)
BILIRUB SERPL-MCNC: 0.7 MG/DL (ref 0.2–1)
BILIRUB SERPL-MCNC: 0.8 MG/DL (ref 0.2–1)
BILIRUB UR QL: NEGATIVE
BILIRUB UR QL: NEGATIVE
BLD PROD TYP BPU: NORMAL
BLD PROD TYP BPU: NORMAL
BLOOD BANK CMNT PATIENT-IMP: NORMAL
BLOOD GROUP ANTIBODIES SERPL: NORMAL
BLOOD GROUP ANTIBODIES SERPL: NORMAL
BNP SERPL-MCNC: 1082 PG/ML (ref 0–900)
BPU ID: NORMAL
BPU ID: NORMAL
BUN BLD-MCNC: 21 MG/DL (ref 7–18)
BUN SERPL-MCNC: 12 MG/DL (ref 7–18)
BUN SERPL-MCNC: 12 MG/DL (ref 7–18)
BUN SERPL-MCNC: 14 MG/DL (ref 7–18)
BUN SERPL-MCNC: 14 MG/DL (ref 7–18)
BUN SERPL-MCNC: 16 MG/DL (ref 7–18)
BUN SERPL-MCNC: 17 MG/DL (ref 7–18)
BUN SERPL-MCNC: 17 MG/DL (ref 7–18)
BUN SERPL-MCNC: 18 MG/DL (ref 7–18)
BUN SERPL-MCNC: 19 MG/DL (ref 8–27)
BUN SERPL-MCNC: 20 MG/DL (ref 7–18)
BUN SERPL-MCNC: 21 MG/DL (ref 7–18)
BUN SERPL-MCNC: 21 MG/DL (ref 7–18)
BUN/CREAT SERPL: 11 (ref 12–20)
BUN/CREAT SERPL: 17 (ref 12–20)
BUN/CREAT SERPL: 17 (ref 12–28)
BUN/CREAT SERPL: 18 (ref 12–20)
BUN/CREAT SERPL: 18 (ref 12–20)
BUN/CREAT SERPL: 20 (ref 12–20)
BUN/CREAT SERPL: 21 (ref 12–20)
BUN/CREAT SERPL: 21 (ref 12–20)
BUN/CREAT SERPL: 22 (ref 12–20)
BUN/CREAT SERPL: 24 (ref 12–20)
BUN/CREAT SERPL: 26 (ref 12–20)
BUN/CREAT SERPL: 26 (ref 12–20)
CALCIUM SERPL-MCNC: 7.5 MG/DL (ref 8.5–10.1)
CALCIUM SERPL-MCNC: 8.2 MG/DL (ref 8.5–10.1)
CALCIUM SERPL-MCNC: 8.3 MG/DL (ref 8.5–10.1)
CALCIUM SERPL-MCNC: 8.4 MG/DL (ref 8.5–10.1)
CALCIUM SERPL-MCNC: 8.5 MG/DL (ref 8.5–10.1)
CALCIUM SERPL-MCNC: 8.5 MG/DL (ref 8.5–10.1)
CALCIUM SERPL-MCNC: 8.8 MG/DL (ref 8.5–10.1)
CALCIUM SERPL-MCNC: 9.1 MG/DL (ref 8.5–10.1)
CALCIUM SERPL-MCNC: 9.4 MG/DL (ref 8.7–10.3)
CALCIUM SERPL-MCNC: 9.6 MG/DL (ref 8.5–10.1)
CALCULATED P AXIS, ECG09: -13 DEGREES
CALCULATED P AXIS, ECG09: 55 DEGREES
CALCULATED R AXIS, ECG10: -10 DEGREES
CALCULATED R AXIS, ECG10: 52 DEGREES
CALCULATED T AXIS, ECG11: -5 DEGREES
CALCULATED T AXIS, ECG11: 65 DEGREES
CHLORIDE BLD-SCNC: 84 MMOL/L (ref 100–108)
CHLORIDE SERPL-SCNC: 100 MMOL/L (ref 100–111)
CHLORIDE SERPL-SCNC: 101 MMOL/L (ref 100–111)
CHLORIDE SERPL-SCNC: 101 MMOL/L (ref 100–111)
CHLORIDE SERPL-SCNC: 104 MMOL/L (ref 100–111)
CHLORIDE SERPL-SCNC: 79 MMOL/L (ref 100–111)
CHLORIDE SERPL-SCNC: 82 MMOL/L (ref 100–111)
CHLORIDE SERPL-SCNC: 86 MMOL/L (ref 100–111)
CHLORIDE SERPL-SCNC: 88 MMOL/L (ref 100–111)
CHLORIDE SERPL-SCNC: 95 MMOL/L (ref 96–106)
CHLORIDE SERPL-SCNC: 96 MMOL/L (ref 100–111)
CHLORIDE SERPL-SCNC: 96 MMOL/L (ref 100–111)
CHLORIDE SERPL-SCNC: 99 MMOL/L (ref 100–111)
CHOLEST SERPL-MCNC: 141 MG/DL (ref 100–199)
CK SERPL-CCNC: 108 U/L (ref 26–192)
CO2 SERPL-SCNC: 24 MMOL/L (ref 20–29)
CO2 SERPL-SCNC: 28 MMOL/L (ref 21–32)
CO2 SERPL-SCNC: 29 MMOL/L (ref 21–32)
CO2 SERPL-SCNC: 30 MMOL/L (ref 21–32)
CO2 SERPL-SCNC: 30 MMOL/L (ref 21–32)
CO2 SERPL-SCNC: 32 MMOL/L (ref 21–32)
CO2 SERPL-SCNC: 33 MMOL/L (ref 21–32)
CO2 SERPL-SCNC: 34 MMOL/L (ref 21–32)
CO2 SERPL-SCNC: 35 MMOL/L (ref 21–32)
CO2 SERPL-SCNC: 36 MMOL/L (ref 21–32)
CO2 SERPL-SCNC: 37 MMOL/L (ref 21–32)
CO2 SERPL-SCNC: 40 MMOL/L (ref 21–32)
COLOR UR: YELLOW
COLOR UR: YELLOW
CREAT SERPL-MCNC: 0.67 MG/DL (ref 0.6–1.3)
CREAT SERPL-MCNC: 0.68 MG/DL (ref 0.6–1.3)
CREAT SERPL-MCNC: 0.69 MG/DL (ref 0.6–1.3)
CREAT SERPL-MCNC: 0.72 MG/DL (ref 0.6–1.3)
CREAT SERPL-MCNC: 0.78 MG/DL (ref 0.6–1.3)
CREAT SERPL-MCNC: 0.79 MG/DL (ref 0.6–1.3)
CREAT SERPL-MCNC: 0.82 MG/DL (ref 0.6–1.3)
CREAT SERPL-MCNC: 0.85 MG/DL (ref 0.6–1.3)
CREAT SERPL-MCNC: 0.93 MG/DL (ref 0.6–1.3)
CREAT SERPL-MCNC: 1.04 MG/DL (ref 0.6–1.3)
CREAT SERPL-MCNC: 1.13 MG/DL (ref 0.57–1)
CREAT SERPL-MCNC: 1.33 MG/DL (ref 0.6–1.3)
CREAT UR-MCNC: 72.1 MG/DL
CROSSMATCH RESULT,%XM: NORMAL
CROSSMATCH RESULT,%XM: NORMAL
DIAGNOSIS, 93000: NORMAL
DIAGNOSIS, 93000: NORMAL
DIFFERENTIAL METHOD BLD: ABNORMAL
ECHO AO ROOT DIAM: 2.51 CM
ECHO AO ROOT DIAM: 2.99 CM
ECHO LA AREA 4C: 15.6 CM2
ECHO LA VOL 2C: 50.17 ML (ref 22–52)
ECHO LA VOL 4C: 35.82 ML (ref 22–52)
ECHO LA VOL BP: 47.28 ML (ref 22–52)
ECHO LA VOL/BSA BIPLANE: 25.3 ML/M2 (ref 16–28)
ECHO LA VOLUME INDEX A2C: 26.84 ML/M2 (ref 16–28)
ECHO LA VOLUME INDEX A4C: 19.17 ML/M2 (ref 16–28)
ECHO LV E' LATERAL VELOCITY: 8.7 CM/S
ECHO LV E' SEPTAL VELOCITY: 5.81 CM/S
ECHO LV EDV TEICHHOLZ: 0.36 ML
ECHO LV EDV TEICHHOLZ: 0.4 ML
ECHO LV ESV TEICHHOLZ: 0.09 ML
ECHO LV ESV TEICHHOLZ: 0.11 ML
ECHO LV INTERNAL DIMENSION DIASTOLIC: 3.75 CM (ref 3.9–5.3)
ECHO LV INTERNAL DIMENSION DIASTOLIC: 3.9 CM (ref 3.9–5.3)
ECHO LV INTERNAL DIMENSION SYSTOLIC: 2.15 CM
ECHO LV INTERNAL DIMENSION SYSTOLIC: 2.28 CM
ECHO LV IVSD: 1.13 CM (ref 0.6–0.9)
ECHO LV IVSD: 1.28 CM (ref 0.6–0.9)
ECHO LV MASS 2D: 149.9 G (ref 67–162)
ECHO LV MASS 2D: 212.4 G (ref 67–162)
ECHO LV MASS INDEX 2D: 112.3 G/M2 (ref 43–95)
ECHO LV MASS INDEX 2D: 80.2 G/M2 (ref 43–95)
ECHO LV POSTERIOR WALL DIASTOLIC: 1.06 CM (ref 0.6–0.9)
ECHO LV POSTERIOR WALL DIASTOLIC: 1.34 CM (ref 0.6–0.9)
ECHO LVOT DIAM: 1.83 CM
ECHO LVOT DIAM: 2.12 CM
ECHO LVOT PEAK GRADIENT: 5 MMHG
ECHO LVOT PEAK VELOCITY: 112.3 CM/S
ECHO LVOT VTI: 17.91 CM
ECHO MV A VELOCITY: 107.77 CM/S
ECHO MV E DECELERATION TIME (DT): 294.6 MS
ECHO MV E VELOCITY: 72.46 CM/S
ECHO MV E/A RATIO: 0.67
ECHO MV E/E' LATERAL: 8.33
ECHO MV E/E' RATIO (AVERAGED): 10.4
ECHO MV E/E' SEPTAL: 12.47
ECHO RV TAPSE: 1.65 CM (ref 1.5–2)
ECHO TV REGURGITANT MAX VELOCITY: 300.22 CM/S
ECHO TV REGURGITANT PEAK GRADIENT: 36.1 MMHG
EOSINOPHIL # BLD AUTO: 0.2 X10E3/UL (ref 0–0.4)
EOSINOPHIL # BLD: 0 K/UL (ref 0–0.4)
EOSINOPHIL # BLD: 0.1 K/UL (ref 0–0.4)
EOSINOPHIL # BLD: 0.1 K/UL (ref 0–0.4)
EOSINOPHIL NFR BLD AUTO: 3 %
EOSINOPHIL NFR BLD: 0 % (ref 0–5)
EOSINOPHIL NFR BLD: 1 % (ref 0–5)
EOSINOPHIL NFR BLD: 1 % (ref 0–5)
EPITH CASTS URNS QL MICRO: ABNORMAL /LPF (ref 0–5)
EPITH CASTS URNS QL MICRO: NORMAL /LPF (ref 0–5)
ERYTHROCYTE [DISTWIDTH] IN BLOOD BY AUTOMATED COUNT: 14 % (ref 11.6–14.5)
ERYTHROCYTE [DISTWIDTH] IN BLOOD BY AUTOMATED COUNT: 14.4 % (ref 12.3–15.4)
ERYTHROCYTE [DISTWIDTH] IN BLOOD BY AUTOMATED COUNT: 14.5 % (ref 11.6–14.5)
ERYTHROCYTE [DISTWIDTH] IN BLOOD BY AUTOMATED COUNT: 14.5 % (ref 11.6–14.5)
ERYTHROCYTE [DISTWIDTH] IN BLOOD BY AUTOMATED COUNT: 14.7 % (ref 11.6–14.5)
ERYTHROCYTE [DISTWIDTH] IN BLOOD BY AUTOMATED COUNT: 14.8 % (ref 11.6–14.5)
ERYTHROCYTE [DISTWIDTH] IN BLOOD BY AUTOMATED COUNT: 14.8 % (ref 11.6–14.5)
ERYTHROCYTE [DISTWIDTH] IN BLOOD BY AUTOMATED COUNT: 14.9 % (ref 11.6–14.5)
ERYTHROCYTE [DISTWIDTH] IN BLOOD BY AUTOMATED COUNT: 15.1 % (ref 11.6–14.5)
ERYTHROCYTE [DISTWIDTH] IN BLOOD BY AUTOMATED COUNT: 15.1 % (ref 11.6–14.5)
EST. AVERAGE GLUCOSE BLD GHB EST-MCNC: 157 MG/DL
FERRITIN SERPL-MCNC: 146 NG/ML (ref 8–388)
FLUAV AG NPH QL IA: NEGATIVE
FLUBV AG NOSE QL IA: NEGATIVE
FOLATE SERPL-MCNC: 7.3 NG/ML (ref 3.1–17.5)
GLOBULIN SER CALC-MCNC: 4 G/DL (ref 1.5–4.5)
GLOBULIN SER CALC-MCNC: 4.1 G/DL (ref 2–4)
GLOBULIN SER CALC-MCNC: 5 G/DL (ref 2–4)
GLUCOSE BLD STRIP.AUTO-MCNC: 106 MG/DL (ref 74–106)
GLUCOSE BLD STRIP.AUTO-MCNC: 109 MG/DL (ref 70–110)
GLUCOSE BLD STRIP.AUTO-MCNC: 111 MG/DL (ref 70–110)
GLUCOSE BLD STRIP.AUTO-MCNC: 111 MG/DL (ref 70–110)
GLUCOSE BLD STRIP.AUTO-MCNC: 112 MG/DL (ref 70–110)
GLUCOSE BLD STRIP.AUTO-MCNC: 122 MG/DL (ref 70–110)
GLUCOSE BLD STRIP.AUTO-MCNC: 123 MG/DL (ref 70–110)
GLUCOSE BLD STRIP.AUTO-MCNC: 127 MG/DL (ref 70–110)
GLUCOSE BLD STRIP.AUTO-MCNC: 128 MG/DL (ref 70–110)
GLUCOSE BLD STRIP.AUTO-MCNC: 129 MG/DL (ref 70–110)
GLUCOSE BLD STRIP.AUTO-MCNC: 131 MG/DL (ref 70–110)
GLUCOSE BLD STRIP.AUTO-MCNC: 131 MG/DL (ref 70–110)
GLUCOSE BLD STRIP.AUTO-MCNC: 133 MG/DL (ref 70–110)
GLUCOSE BLD STRIP.AUTO-MCNC: 134 MG/DL (ref 70–110)
GLUCOSE BLD STRIP.AUTO-MCNC: 135 MG/DL (ref 70–110)
GLUCOSE BLD STRIP.AUTO-MCNC: 136 MG/DL (ref 70–110)
GLUCOSE BLD STRIP.AUTO-MCNC: 137 MG/DL (ref 70–110)
GLUCOSE BLD STRIP.AUTO-MCNC: 145 MG/DL (ref 70–110)
GLUCOSE BLD STRIP.AUTO-MCNC: 147 MG/DL (ref 70–110)
GLUCOSE BLD STRIP.AUTO-MCNC: 149 MG/DL (ref 70–110)
GLUCOSE BLD STRIP.AUTO-MCNC: 153 MG/DL (ref 70–110)
GLUCOSE BLD STRIP.AUTO-MCNC: 158 MG/DL (ref 70–110)
GLUCOSE BLD STRIP.AUTO-MCNC: 164 MG/DL (ref 70–110)
GLUCOSE BLD STRIP.AUTO-MCNC: 165 MG/DL (ref 70–110)
GLUCOSE BLD STRIP.AUTO-MCNC: 167 MG/DL (ref 70–110)
GLUCOSE BLD STRIP.AUTO-MCNC: 169 MG/DL (ref 70–110)
GLUCOSE BLD STRIP.AUTO-MCNC: 169 MG/DL (ref 70–110)
GLUCOSE BLD STRIP.AUTO-MCNC: 170 MG/DL (ref 70–110)
GLUCOSE BLD STRIP.AUTO-MCNC: 178 MG/DL (ref 70–110)
GLUCOSE BLD STRIP.AUTO-MCNC: 179 MG/DL (ref 70–110)
GLUCOSE BLD STRIP.AUTO-MCNC: 182 MG/DL (ref 70–110)
GLUCOSE BLD STRIP.AUTO-MCNC: 182 MG/DL (ref 70–110)
GLUCOSE BLD STRIP.AUTO-MCNC: 187 MG/DL (ref 70–110)
GLUCOSE BLD STRIP.AUTO-MCNC: 189 MG/DL (ref 70–110)
GLUCOSE BLD STRIP.AUTO-MCNC: 192 MG/DL (ref 70–110)
GLUCOSE BLD STRIP.AUTO-MCNC: 192 MG/DL (ref 70–110)
GLUCOSE BLD STRIP.AUTO-MCNC: 193 MG/DL (ref 70–110)
GLUCOSE BLD STRIP.AUTO-MCNC: 194 MG/DL (ref 70–110)
GLUCOSE BLD STRIP.AUTO-MCNC: 194 MG/DL (ref 70–110)
GLUCOSE BLD STRIP.AUTO-MCNC: 200 MG/DL (ref 70–110)
GLUCOSE BLD STRIP.AUTO-MCNC: 202 MG/DL (ref 70–110)
GLUCOSE BLD STRIP.AUTO-MCNC: 210 MG/DL (ref 70–110)
GLUCOSE BLD STRIP.AUTO-MCNC: 210 MG/DL (ref 70–110)
GLUCOSE BLD STRIP.AUTO-MCNC: 211 MG/DL (ref 70–110)
GLUCOSE BLD STRIP.AUTO-MCNC: 215 MG/DL (ref 70–110)
GLUCOSE BLD STRIP.AUTO-MCNC: 215 MG/DL (ref 70–110)
GLUCOSE BLD STRIP.AUTO-MCNC: 216 MG/DL (ref 70–110)
GLUCOSE BLD STRIP.AUTO-MCNC: 217 MG/DL (ref 70–110)
GLUCOSE BLD STRIP.AUTO-MCNC: 220 MG/DL (ref 70–110)
GLUCOSE BLD STRIP.AUTO-MCNC: 226 MG/DL (ref 70–110)
GLUCOSE BLD STRIP.AUTO-MCNC: 227 MG/DL (ref 70–110)
GLUCOSE BLD STRIP.AUTO-MCNC: 230 MG/DL (ref 70–110)
GLUCOSE BLD STRIP.AUTO-MCNC: 232 MG/DL (ref 70–110)
GLUCOSE BLD STRIP.AUTO-MCNC: 232 MG/DL (ref 70–110)
GLUCOSE BLD STRIP.AUTO-MCNC: 234 MG/DL (ref 70–110)
GLUCOSE BLD STRIP.AUTO-MCNC: 235 MG/DL (ref 70–110)
GLUCOSE BLD STRIP.AUTO-MCNC: 240 MG/DL (ref 70–110)
GLUCOSE BLD STRIP.AUTO-MCNC: 242 MG/DL (ref 70–110)
GLUCOSE BLD STRIP.AUTO-MCNC: 249 MG/DL (ref 70–110)
GLUCOSE BLD STRIP.AUTO-MCNC: 254 MG/DL (ref 70–110)
GLUCOSE BLD STRIP.AUTO-MCNC: 256 MG/DL (ref 70–110)
GLUCOSE BLD STRIP.AUTO-MCNC: 258 MG/DL (ref 70–110)
GLUCOSE BLD STRIP.AUTO-MCNC: 271 MG/DL (ref 70–110)
GLUCOSE BLD STRIP.AUTO-MCNC: 271 MG/DL (ref 70–110)
GLUCOSE BLD STRIP.AUTO-MCNC: 278 MG/DL (ref 70–110)
GLUCOSE BLD STRIP.AUTO-MCNC: 284 MG/DL (ref 70–110)
GLUCOSE BLD STRIP.AUTO-MCNC: 286 MG/DL (ref 70–110)
GLUCOSE BLD STRIP.AUTO-MCNC: 295 MG/DL (ref 70–110)
GLUCOSE BLD STRIP.AUTO-MCNC: 300 MG/DL (ref 70–110)
GLUCOSE BLD STRIP.AUTO-MCNC: 302 MG/DL (ref 70–110)
GLUCOSE BLD STRIP.AUTO-MCNC: 312 MG/DL (ref 70–110)
GLUCOSE BLD STRIP.AUTO-MCNC: 312 MG/DL (ref 70–110)
GLUCOSE BLD STRIP.AUTO-MCNC: 318 MG/DL (ref 70–110)
GLUCOSE BLD STRIP.AUTO-MCNC: 321 MG/DL (ref 70–110)
GLUCOSE BLD STRIP.AUTO-MCNC: 336 MG/DL (ref 70–110)
GLUCOSE BLD STRIP.AUTO-MCNC: 382 MG/DL (ref 70–110)
GLUCOSE BLD STRIP.AUTO-MCNC: 57 MG/DL (ref 70–110)
GLUCOSE BLD STRIP.AUTO-MCNC: 76 MG/DL (ref 70–110)
GLUCOSE BLD STRIP.AUTO-MCNC: 76 MG/DL (ref 70–110)
GLUCOSE BLD STRIP.AUTO-MCNC: 81 MG/DL (ref 70–110)
GLUCOSE BLD STRIP.AUTO-MCNC: 89 MG/DL (ref 70–110)
GLUCOSE BLD STRIP.AUTO-MCNC: 92 MG/DL (ref 70–110)
GLUCOSE BLD STRIP.AUTO-MCNC: 99 MG/DL (ref 70–110)
GLUCOSE SERPL-MCNC: 109 MG/DL (ref 74–99)
GLUCOSE SERPL-MCNC: 109 MG/DL (ref 74–99)
GLUCOSE SERPL-MCNC: 112 MG/DL (ref 65–99)
GLUCOSE SERPL-MCNC: 121 MG/DL (ref 74–99)
GLUCOSE SERPL-MCNC: 145 MG/DL (ref 74–99)
GLUCOSE SERPL-MCNC: 157 MG/DL (ref 74–99)
GLUCOSE SERPL-MCNC: 161 MG/DL (ref 74–99)
GLUCOSE SERPL-MCNC: 169 MG/DL (ref 74–99)
GLUCOSE SERPL-MCNC: 199 MG/DL (ref 74–99)
GLUCOSE SERPL-MCNC: 444 MG/DL (ref 74–99)
GLUCOSE SERPL-MCNC: 91 MG/DL (ref 74–99)
GLUCOSE SERPL-MCNC: 96 MG/DL (ref 74–99)
GLUCOSE UR STRIP.AUTO-MCNC: NEGATIVE MG/DL
GLUCOSE UR STRIP.AUTO-MCNC: NEGATIVE MG/DL
GRAM STN SPEC: NORMAL
HBA1C MFR BLD: 7.1 % (ref 4.2–5.6)
HCT VFR BLD AUTO: 22.2 % (ref 35–45)
HCT VFR BLD AUTO: 23 % (ref 35–45)
HCT VFR BLD AUTO: 23.1 % (ref 35–45)
HCT VFR BLD AUTO: 23.1 % (ref 35–45)
HCT VFR BLD AUTO: 23.2 % (ref 35–45)
HCT VFR BLD AUTO: 23.5 % (ref 35–45)
HCT VFR BLD AUTO: 26.1 % (ref 35–45)
HCT VFR BLD AUTO: 26.7 % (ref 35–45)
HCT VFR BLD AUTO: 31.4 % (ref 35–45)
HCT VFR BLD AUTO: 35.9 % (ref 35–45)
HCT VFR BLD AUTO: 36.6 % (ref 35–45)
HCT VFR BLD AUTO: 39.6 % (ref 35–45)
HCT VFR BLD AUTO: 39.8 % (ref 34–46.6)
HCT VFR BLD AUTO: 42 % (ref 35–45)
HCT VFR BLD CALC: 41 % (ref 36–49)
HDLC SERPL-MCNC: 39 MG/DL
HEMOCCULT STL QL: NEGATIVE
HGB BLD-MCNC: 12.3 G/DL (ref 12–16)
HGB BLD-MCNC: 12.5 G/DL (ref 12–16)
HGB BLD-MCNC: 13.1 G/DL (ref 11.1–15.9)
HGB BLD-MCNC: 13.7 G/DL (ref 12–16)
HGB BLD-MCNC: 13.9 G/DL (ref 12–16)
HGB BLD-MCNC: 14.1 G/DL (ref 12–16)
HGB BLD-MCNC: 7.4 G/DL (ref 12–16)
HGB BLD-MCNC: 7.6 G/DL (ref 12–16)
HGB BLD-MCNC: 7.7 G/DL (ref 12–16)
HGB BLD-MCNC: 7.9 G/DL (ref 12–16)
HGB BLD-MCNC: 8.5 G/DL (ref 12–16)
HGB BLD-MCNC: 8.6 G/DL (ref 12–16)
HGB BLD-MCNC: 9.8 G/DL (ref 12–16)
HGB UR QL STRIP: NEGATIVE
HGB UR QL STRIP: NEGATIVE
IMM GRANULOCYTES # BLD AUTO: 0 X10E3/UL (ref 0–0.1)
IMM GRANULOCYTES NFR BLD AUTO: 0 %
IRON SATN MFR SERPL: 5 %
IRON SERPL-MCNC: 11 UG/DL (ref 50–175)
KETONES UR QL STRIP.AUTO: NEGATIVE MG/DL
KETONES UR QL STRIP.AUTO: NEGATIVE MG/DL
LDLC SERPL CALC-MCNC: 61 MG/DL (ref 0–99)
LEUKOCYTE ESTERASE UR QL STRIP.AUTO: ABNORMAL
LEUKOCYTE ESTERASE UR QL STRIP.AUTO: NEGATIVE
LVFS 2D: 41.49 %
LVFS 2D: 42.71 %
LVOT MG: 2.72 MMHG
LVOT MV: 0.77 CM/S
LVSV (TEICH): 23.5 ML
LVSV (TEICH): 24.83 ML
LYMPHOCYTES # BLD AUTO: 2.3 X10E3/UL (ref 0.7–3.1)
LYMPHOCYTES # BLD: 0.8 K/UL (ref 0.9–3.6)
LYMPHOCYTES # BLD: 1 K/UL (ref 0.9–3.6)
LYMPHOCYTES # BLD: 1.2 K/UL (ref 0.9–3.6)
LYMPHOCYTES # BLD: 1.4 K/UL (ref 0.9–3.6)
LYMPHOCYTES # BLD: 1.6 K/UL (ref 0.9–3.6)
LYMPHOCYTES # BLD: 2.9 K/UL (ref 0.9–3.6)
LYMPHOCYTES # BLD: 3.1 K/UL (ref 0.9–3.6)
LYMPHOCYTES # BLD: 3.6 K/UL (ref 0.9–3.6)
LYMPHOCYTES # BLD: 4.1 K/UL (ref 0.9–3.6)
LYMPHOCYTES NFR BLD AUTO: 39 %
LYMPHOCYTES NFR BLD: 10 % (ref 21–52)
LYMPHOCYTES NFR BLD: 13 % (ref 21–52)
LYMPHOCYTES NFR BLD: 14 % (ref 21–52)
LYMPHOCYTES NFR BLD: 15 % (ref 21–52)
LYMPHOCYTES NFR BLD: 22 % (ref 21–52)
LYMPHOCYTES NFR BLD: 25 % (ref 21–52)
LYMPHOCYTES NFR BLD: 32 % (ref 21–52)
LYMPHOCYTES NFR BLD: 33 % (ref 21–52)
LYMPHOCYTES NFR BLD: 5 % (ref 21–52)
MAGNESIUM SERPL-MCNC: 1.5 MG/DL (ref 1.6–2.6)
MAGNESIUM SERPL-MCNC: 1.7 MG/DL (ref 1.6–2.6)
MAGNESIUM SERPL-MCNC: 1.8 MG/DL (ref 1.6–2.6)
MAGNESIUM SERPL-MCNC: 1.9 MG/DL (ref 1.6–2.6)
MCH RBC QN AUTO: 27.1 PG (ref 24–34)
MCH RBC QN AUTO: 27.1 PG (ref 24–34)
MCH RBC QN AUTO: 27.4 PG (ref 24–34)
MCH RBC QN AUTO: 27.5 PG (ref 24–34)
MCH RBC QN AUTO: 27.8 PG (ref 24–34)
MCH RBC QN AUTO: 27.8 PG (ref 24–34)
MCH RBC QN AUTO: 27.9 PG (ref 24–34)
MCH RBC QN AUTO: 28 PG (ref 24–34)
MCH RBC QN AUTO: 28.4 PG (ref 26.6–33)
MCH RBC QN AUTO: 29.1 PG (ref 24–34)
MCHC RBC AUTO-ENTMCNC: 32 G/DL (ref 31–37)
MCHC RBC AUTO-ENTMCNC: 32.9 G/DL (ref 31.5–35.7)
MCHC RBC AUTO-ENTMCNC: 33 G/DL (ref 31–37)
MCHC RBC AUTO-ENTMCNC: 33.2 G/DL (ref 31–37)
MCHC RBC AUTO-ENTMCNC: 33.5 G/DL (ref 31–37)
MCHC RBC AUTO-ENTMCNC: 33.6 G/DL (ref 31–37)
MCHC RBC AUTO-ENTMCNC: 34.2 G/DL (ref 31–37)
MCHC RBC AUTO-ENTMCNC: 34.2 G/DL (ref 31–37)
MCHC RBC AUTO-ENTMCNC: 34.3 G/DL (ref 31–37)
MCHC RBC AUTO-ENTMCNC: 34.6 G/DL (ref 31–37)
MCV RBC AUTO: 80.7 FL (ref 74–97)
MCV RBC AUTO: 81.2 FL (ref 74–97)
MCV RBC AUTO: 81.3 FL (ref 74–97)
MCV RBC AUTO: 82.1 FL (ref 74–97)
MCV RBC AUTO: 82.3 FL (ref 74–97)
MCV RBC AUTO: 82.6 FL (ref 74–97)
MCV RBC AUTO: 83.3 FL (ref 74–97)
MCV RBC AUTO: 84.6 FL (ref 74–97)
MCV RBC AUTO: 85.3 FL (ref 74–97)
MCV RBC AUTO: 86 FL (ref 79–97)
MICROALBUMIN UR-MCNC: 17.8 UG/ML
MONOCYTES # BLD AUTO: 0.5 X10E3/UL (ref 0.1–0.9)
MONOCYTES # BLD: 0.2 K/UL (ref 0.05–1.2)
MONOCYTES # BLD: 0.4 K/UL (ref 0.05–1.2)
MONOCYTES # BLD: 0.6 K/UL (ref 0.05–1.2)
MONOCYTES # BLD: 0.7 K/UL (ref 0.05–1.2)
MONOCYTES # BLD: 0.7 K/UL (ref 0.05–1.2)
MONOCYTES # BLD: 0.8 K/UL (ref 0.05–1.2)
MONOCYTES # BLD: 0.9 K/UL (ref 0.05–1.2)
MONOCYTES NFR BLD AUTO: 8 %
MONOCYTES NFR BLD: 3 % (ref 3–10)
MONOCYTES NFR BLD: 4 % (ref 3–10)
MONOCYTES NFR BLD: 4 % (ref 3–10)
MONOCYTES NFR BLD: 6 % (ref 3–10)
MONOCYTES NFR BLD: 6 % (ref 3–10)
MONOCYTES NFR BLD: 7 % (ref 3–10)
MV DEC SLOPE: 2.46
NEUTROPHILS # BLD AUTO: 3 X10E3/UL (ref 1.4–7)
NEUTROPHILS NFR BLD AUTO: 49 %
NEUTS SEG # BLD: 13.2 K/UL (ref 1.8–8)
NEUTS SEG # BLD: 13.9 K/UL (ref 1.8–8)
NEUTS SEG # BLD: 5.7 K/UL (ref 1.8–8)
NEUTS SEG # BLD: 6.3 K/UL (ref 1.8–8)
NEUTS SEG # BLD: 7 K/UL (ref 1.8–8)
NEUTS SEG # BLD: 7.7 K/UL (ref 1.8–8)
NEUTS SEG # BLD: 7.7 K/UL (ref 1.8–8)
NEUTS SEG # BLD: 8.6 K/UL (ref 1.8–8)
NEUTS SEG # BLD: 9.7 K/UL (ref 1.8–8)
NEUTS SEG NFR BLD: 60 % (ref 40–73)
NEUTS SEG NFR BLD: 60 % (ref 40–73)
NEUTS SEG NFR BLD: 68 % (ref 40–73)
NEUTS SEG NFR BLD: 72 % (ref 40–73)
NEUTS SEG NFR BLD: 79 % (ref 40–73)
NEUTS SEG NFR BLD: 82 % (ref 40–73)
NEUTS SEG NFR BLD: 82 % (ref 40–73)
NEUTS SEG NFR BLD: 84 % (ref 40–73)
NEUTS SEG NFR BLD: 91 % (ref 40–73)
NITRITE UR QL STRIP.AUTO: NEGATIVE
NITRITE UR QL STRIP.AUTO: NEGATIVE
P-R INTERVAL, ECG05: 138 MS
P-R INTERVAL, ECG05: 92 MS
PH UR STRIP: 6.5 [PH] (ref 5–8)
PH UR STRIP: 6.5 [PH] (ref 5–8)
PLATELET # BLD AUTO: 109 K/UL (ref 135–420)
PLATELET # BLD AUTO: 126 K/UL (ref 135–420)
PLATELET # BLD AUTO: 140 K/UL (ref 135–420)
PLATELET # BLD AUTO: 142 K/UL (ref 135–420)
PLATELET # BLD AUTO: 146 K/UL (ref 135–420)
PLATELET # BLD AUTO: 148 K/UL (ref 135–420)
PLATELET # BLD AUTO: 156 X10E3/UL (ref 150–450)
PLATELET # BLD AUTO: 157 K/UL (ref 135–420)
PLATELET # BLD AUTO: 159 K/UL (ref 135–420)
PLATELET # BLD AUTO: 161 K/UL (ref 135–420)
PMV BLD AUTO: 10 FL (ref 9.2–11.8)
PMV BLD AUTO: 8.4 FL (ref 9.2–11.8)
PMV BLD AUTO: 8.8 FL (ref 9.2–11.8)
PMV BLD AUTO: 8.9 FL (ref 9.2–11.8)
PMV BLD AUTO: 9 FL (ref 9.2–11.8)
PMV BLD AUTO: 9.1 FL (ref 9.2–11.8)
PMV BLD AUTO: 9.3 FL (ref 9.2–11.8)
PMV BLD AUTO: 9.6 FL (ref 9.2–11.8)
PMV BLD AUTO: 9.6 FL (ref 9.2–11.8)
POTASSIUM BLD-SCNC: 3.3 MMOL/L (ref 3.5–5.5)
POTASSIUM SERPL-SCNC: 2.7 MMOL/L (ref 3.5–5.5)
POTASSIUM SERPL-SCNC: 2.8 MMOL/L (ref 3.5–5.5)
POTASSIUM SERPL-SCNC: 3 MMOL/L (ref 3.5–5.5)
POTASSIUM SERPL-SCNC: 3.2 MMOL/L (ref 3.5–5.5)
POTASSIUM SERPL-SCNC: 3.3 MMOL/L (ref 3.5–5.5)
POTASSIUM SERPL-SCNC: 3.7 MMOL/L (ref 3.5–5.5)
POTASSIUM SERPL-SCNC: 3.9 MMOL/L (ref 3.5–5.5)
POTASSIUM SERPL-SCNC: 4 MMOL/L (ref 3.5–5.5)
POTASSIUM SERPL-SCNC: 4.3 MMOL/L (ref 3.5–5.5)
POTASSIUM SERPL-SCNC: 4.6 MMOL/L (ref 3.5–5.5)
POTASSIUM SERPL-SCNC: 4.7 MMOL/L (ref 3.5–5.2)
POTASSIUM SERPL-SCNC: 4.7 MMOL/L (ref 3.5–5.5)
PROT SERPL-MCNC: 6.7 G/DL (ref 6.4–8.2)
PROT SERPL-MCNC: 8.1 G/DL (ref 6.4–8.2)
PROT SERPL-MCNC: 8.2 G/DL (ref 6–8.5)
PROT UR STRIP-MCNC: 100 MG/DL
PROT UR STRIP-MCNC: ABNORMAL MG/DL
Q-T INTERVAL, ECG07: 386 MS
Q-T INTERVAL, ECG07: 440 MS
QRS DURATION, ECG06: 70 MS
QRS DURATION, ECG06: 86 MS
QTC CALCULATION (BEZET), ECG08: 478 MS
QTC CALCULATION (BEZET), ECG08: 497 MS
RBC # BLD AUTO: 2.73 M/UL (ref 4.2–5.3)
RBC # BLD AUTO: 2.73 M/UL (ref 4.2–5.3)
RBC # BLD AUTO: 2.8 M/UL (ref 4.2–5.3)
RBC # BLD AUTO: 2.81 M/UL (ref 4.2–5.3)
RBC # BLD AUTO: 3.17 M/UL (ref 4.2–5.3)
RBC # BLD AUTO: 4.29 M/UL (ref 4.2–5.3)
RBC # BLD AUTO: 4.42 M/UL (ref 4.2–5.3)
RBC # BLD AUTO: 4.61 X10E6/UL (ref 3.77–5.28)
RBC # BLD AUTO: 4.91 M/UL (ref 4.2–5.3)
RBC # BLD AUTO: 5.04 M/UL (ref 4.2–5.3)
RBC #/AREA URNS HPF: ABNORMAL /HPF (ref 0–5)
RBC #/AREA URNS HPF: NORMAL /HPF (ref 0–5)
RETICS/RBC NFR AUTO: 2.5 % (ref 0.5–2.3)
SERVICE CMNT-IMP: NORMAL
SODIUM BLD-SCNC: 128 MMOL/L (ref 136–145)
SODIUM SERPL-SCNC: 120 MMOL/L (ref 136–145)
SODIUM SERPL-SCNC: 127 MMOL/L (ref 136–145)
SODIUM SERPL-SCNC: 128 MMOL/L (ref 136–145)
SODIUM SERPL-SCNC: 128 MMOL/L (ref 136–145)
SODIUM SERPL-SCNC: 131 MMOL/L (ref 136–145)
SODIUM SERPL-SCNC: 132 MMOL/L (ref 136–145)
SODIUM SERPL-SCNC: 136 MMOL/L (ref 136–145)
SODIUM SERPL-SCNC: 136 MMOL/L (ref 136–145)
SODIUM SERPL-SCNC: 138 MMOL/L (ref 136–145)
SODIUM SERPL-SCNC: 139 MMOL/L (ref 134–144)
SODIUM SERPL-SCNC: 140 MMOL/L (ref 136–145)
SODIUM SERPL-SCNC: 141 MMOL/L (ref 136–145)
SP GR UR REFRACTOMETRY: 1.01 (ref 1–1.03)
SP GR UR REFRACTOMETRY: 1.01 (ref 1–1.03)
SPECIMEN EXP DATE BLD: NORMAL
SPECIMEN SOURCE: NORMAL
SPECIMEN SOURCE: NORMAL
SPECIMEN STATUS REPORT, ROLRST: NORMAL
STATUS OF UNIT,%ST: NORMAL
STATUS OF UNIT,%ST: NORMAL
T4 FREE SERPL-MCNC: 1.3 NG/DL (ref 0.7–1.5)
TIBC SERPL-MCNC: 215 UG/DL (ref 250–450)
TRIGL SERPL-MCNC: 204 MG/DL (ref 0–149)
TROPONIN I SERPL-MCNC: <0.02 NG/ML (ref 0–0.04)
TSH SERPL DL<=0.05 MIU/L-ACNC: 0.74 UIU/ML (ref 0.36–3.74)
UNIT DIVISION, %UDIV: 0
UNIT DIVISION, %UDIV: 0
UROBILINOGEN UR QL STRIP.AUTO: 1 EU/DL (ref 0.2–1)
UROBILINOGEN UR QL STRIP.AUTO: 1 EU/DL (ref 0.2–1)
VENTRICULAR RATE, ECG03: 100 BPM
VENTRICULAR RATE, ECG03: 71 BPM
VIT B12 SERPL-MCNC: 393 PG/ML (ref 211–911)
VLDLC SERPL CALC-MCNC: 41 MG/DL (ref 5–40)
WBC # BLD AUTO: 10.4 K/UL (ref 4.6–13.2)
WBC # BLD AUTO: 10.7 K/UL (ref 4.6–13.2)
WBC # BLD AUTO: 11.4 K/UL (ref 4.6–13.2)
WBC # BLD AUTO: 12.7 K/UL (ref 4.6–13.2)
WBC # BLD AUTO: 13.6 K/UL (ref 4.6–13.2)
WBC # BLD AUTO: 14.7 K/UL (ref 4.6–13.2)
WBC # BLD AUTO: 16.4 K/UL (ref 4.6–13.2)
WBC # BLD AUTO: 6 X10E3/UL (ref 3.4–10.8)
WBC # BLD AUTO: 7 K/UL (ref 4.6–13.2)
WBC # BLD AUTO: 8.9 K/UL (ref 4.6–13.2)
WBC URNS QL MICRO: ABNORMAL /HPF (ref 0–4)
WBC URNS QL MICRO: NORMAL /HPF (ref 0–4)
XX-LABCORP SPECIMEN COL,LCBCF: NORMAL

## 2019-01-01 PROCEDURE — 74011636637 HC RX REV CODE- 636/637: Performed by: INTERNAL MEDICINE

## 2019-01-01 PROCEDURE — 82962 GLUCOSE BLOOD TEST: CPT

## 2019-01-01 PROCEDURE — 51702 INSERT TEMP BLADDER CATH: CPT

## 2019-01-01 PROCEDURE — 74011000250 HC RX REV CODE- 250: Performed by: ORTHOPAEDIC SURGERY

## 2019-01-01 PROCEDURE — 93306 TTE W/DOPPLER COMPLETE: CPT

## 2019-01-01 PROCEDURE — 97112 NEUROMUSCULAR REEDUCATION: CPT

## 2019-01-01 PROCEDURE — 74011250637 HC RX REV CODE- 250/637: Performed by: FAMILY MEDICINE

## 2019-01-01 PROCEDURE — 97110 THERAPEUTIC EXERCISES: CPT

## 2019-01-01 PROCEDURE — 96372 THER/PROPH/DIAG INJ SC/IM: CPT

## 2019-01-01 PROCEDURE — 97535 SELF CARE MNGMENT TRAINING: CPT

## 2019-01-01 PROCEDURE — 74011250636 HC RX REV CODE- 250/636: Performed by: EMERGENCY MEDICINE

## 2019-01-01 PROCEDURE — A9575 INJ GADOTERATE MEGLUMI 0.1ML: HCPCS | Performed by: EMERGENCY MEDICINE

## 2019-01-01 PROCEDURE — 71046 X-RAY EXAM CHEST 2 VIEWS: CPT

## 2019-01-01 PROCEDURE — 82272 OCCULT BLD FECES 1-3 TESTS: CPT

## 2019-01-01 PROCEDURE — 77030038269 HC DRN EXT URIN PURWCK BARD -A

## 2019-01-01 PROCEDURE — 99001 SPECIMEN HANDLING PT-LAB: CPT

## 2019-01-01 PROCEDURE — 86902 BLOOD TYPE ANTIGEN DONOR EA: CPT

## 2019-01-01 PROCEDURE — 74011250636 HC RX REV CODE- 250/636: Performed by: HOSPITALIST

## 2019-01-01 PROCEDURE — 74011250636 HC RX REV CODE- 250/636: Performed by: PHYSICIAN ASSISTANT

## 2019-01-01 PROCEDURE — 88311 DECALCIFY TISSUE: CPT

## 2019-01-01 PROCEDURE — 74011000250 HC RX REV CODE- 250: Performed by: EMERGENCY MEDICINE

## 2019-01-01 PROCEDURE — 36415 COLL VENOUS BLD VENIPUNCTURE: CPT

## 2019-01-01 PROCEDURE — 65310000000 HC RM PRIVATE REHAB

## 2019-01-01 PROCEDURE — 74011250636 HC RX REV CODE- 250/636: Performed by: ORTHOPAEDIC SURGERY

## 2019-01-01 PROCEDURE — 74011000250 HC RX REV CODE- 250: Performed by: INTERNAL MEDICINE

## 2019-01-01 PROCEDURE — 82607 VITAMIN B-12: CPT

## 2019-01-01 PROCEDURE — 82728 ASSAY OF FERRITIN: CPT

## 2019-01-01 PROCEDURE — 74011250637 HC RX REV CODE- 250/637: Performed by: INTERNAL MEDICINE

## 2019-01-01 PROCEDURE — 83735 ASSAY OF MAGNESIUM: CPT

## 2019-01-01 PROCEDURE — 77030011265 HC ELECTRD BLD HEX COVD -A: Performed by: ORTHOPAEDIC SURGERY

## 2019-01-01 PROCEDURE — 74011250636 HC RX REV CODE- 250/636

## 2019-01-01 PROCEDURE — 80053 COMPREHEN METABOLIC PANEL: CPT

## 2019-01-01 PROCEDURE — 74011250637 HC RX REV CODE- 250/637: Performed by: ORTHOPAEDIC SURGERY

## 2019-01-01 PROCEDURE — 94640 AIRWAY INHALATION TREATMENT: CPT

## 2019-01-01 PROCEDURE — 72157 MRI CHEST SPINE W/O & W/DYE: CPT

## 2019-01-01 PROCEDURE — 81001 URINALYSIS AUTO W/SCOPE: CPT

## 2019-01-01 PROCEDURE — 74011636637 HC RX REV CODE- 636/637: Performed by: FAMILY MEDICINE

## 2019-01-01 PROCEDURE — 74011250637 HC RX REV CODE- 250/637: Performed by: PHYSICIAN ASSISTANT

## 2019-01-01 PROCEDURE — 74011250637 HC RX REV CODE- 250/637: Performed by: NURSE PRACTITIONER

## 2019-01-01 PROCEDURE — 97530 THERAPEUTIC ACTIVITIES: CPT

## 2019-01-01 PROCEDURE — 72141 MRI NECK SPINE W/O DYE: CPT

## 2019-01-01 PROCEDURE — 97542 WHEELCHAIR MNGMENT TRAINING: CPT

## 2019-01-01 PROCEDURE — 96375 TX/PRO/DX INJ NEW DRUG ADDON: CPT

## 2019-01-01 PROCEDURE — 74011250637 HC RX REV CODE- 250/637: Performed by: HOSPITALIST

## 2019-01-01 PROCEDURE — 77030013567 HC DRN WND RESERV BARD -A: Performed by: ORTHOPAEDIC SURGERY

## 2019-01-01 PROCEDURE — 85025 COMPLETE CBC W/AUTO DIFF WBC: CPT

## 2019-01-01 PROCEDURE — 74011636637 HC RX REV CODE- 636/637: Performed by: HOSPITALIST

## 2019-01-01 PROCEDURE — 86900 BLOOD TYPING SEROLOGIC ABO: CPT

## 2019-01-01 PROCEDURE — 88307 TISSUE EXAM BY PATHOLOGIST: CPT

## 2019-01-01 PROCEDURE — 97165 OT EVAL LOW COMPLEX 30 MIN: CPT

## 2019-01-01 PROCEDURE — 77030012406 HC DRN WND PENRS BARD -A: Performed by: ORTHOPAEDIC SURGERY

## 2019-01-01 PROCEDURE — 77030040361 HC SLV COMPR DVT MDII -B: Performed by: ORTHOPAEDIC SURGERY

## 2019-01-01 PROCEDURE — 74011000272 HC RX REV CODE- 272: Performed by: ORTHOPAEDIC SURGERY

## 2019-01-01 PROCEDURE — 80048 BASIC METABOLIC PNL TOTAL CA: CPT

## 2019-01-01 PROCEDURE — 74011000250 HC RX REV CODE- 250: Performed by: PHYSICIAN ASSISTANT

## 2019-01-01 PROCEDURE — 84484 ASSAY OF TROPONIN QUANT: CPT

## 2019-01-01 PROCEDURE — 74011000250 HC RX REV CODE- 250: Performed by: NURSE ANESTHETIST, CERTIFIED REGISTERED

## 2019-01-01 PROCEDURE — 94762 N-INVAS EAR/PLS OXIMTRY CONT: CPT

## 2019-01-01 PROCEDURE — 77030037878 HC DRSG MEPILEX >48IN BORD MOLN -B

## 2019-01-01 PROCEDURE — 74011000258 HC RX REV CODE- 258: Performed by: ORTHOPAEDIC SURGERY

## 2019-01-01 PROCEDURE — 77030012890

## 2019-01-01 PROCEDURE — 74011636637 HC RX REV CODE- 636/637: Performed by: ORTHOPAEDIC SURGERY

## 2019-01-01 PROCEDURE — 77030011218 HC DEV BIOP BN KYPH -C: Performed by: ORTHOPAEDIC SURGERY

## 2019-01-01 PROCEDURE — 99284 EMERGENCY DEPT VISIT MOD MDM: CPT

## 2019-01-01 PROCEDURE — 87804 INFLUENZA ASSAY W/OPTIC: CPT

## 2019-01-01 PROCEDURE — 85018 HEMOGLOBIN: CPT

## 2019-01-01 PROCEDURE — 74011636637 HC RX REV CODE- 636/637: Performed by: EMERGENCY MEDICINE

## 2019-01-01 PROCEDURE — 74011250636 HC RX REV CODE- 250/636: Performed by: INTERNAL MEDICINE

## 2019-01-01 PROCEDURE — 83036 HEMOGLOBIN GLYCOSYLATED A1C: CPT

## 2019-01-01 PROCEDURE — 65270000029 HC RM PRIVATE

## 2019-01-01 PROCEDURE — 76060000034 HC ANESTHESIA 1.5 TO 2 HR

## 2019-01-01 PROCEDURE — 77030020274 HC MISC IMPL ORTHOPEDIC: Performed by: ORTHOPAEDIC SURGERY

## 2019-01-01 PROCEDURE — 77030018836 HC SOL IRR NACL ICUM -A: Performed by: ORTHOPAEDIC SURGERY

## 2019-01-01 PROCEDURE — 86920 COMPATIBILITY TEST SPIN: CPT

## 2019-01-01 PROCEDURE — 72148 MRI LUMBAR SPINE W/O DYE: CPT

## 2019-01-01 PROCEDURE — 96374 THER/PROPH/DIAG INJ IV PUSH: CPT

## 2019-01-01 PROCEDURE — 77030032764 HC GLDSCP STAT GVL VERT -B: Performed by: ORTHOPAEDIC SURGERY

## 2019-01-01 PROCEDURE — 88305 TISSUE EXAM BY PATHOLOGIST: CPT

## 2019-01-01 PROCEDURE — 93308 TTE F-UP OR LMTD: CPT

## 2019-01-01 PROCEDURE — 83880 ASSAY OF NATRIURETIC PEPTIDE: CPT

## 2019-01-01 PROCEDURE — 77010033678 HC OXYGEN DAILY

## 2019-01-01 PROCEDURE — 97166 OT EVAL MOD COMPLEX 45 MIN: CPT

## 2019-01-01 PROCEDURE — 70450 CT HEAD/BRAIN W/O DYE: CPT

## 2019-01-01 PROCEDURE — 87040 BLOOD CULTURE FOR BACTERIA: CPT

## 2019-01-01 PROCEDURE — 87075 CULTR BACTERIA EXCEPT BLOOD: CPT

## 2019-01-01 PROCEDURE — 76210000017 HC OR PH I REC 1.5 TO 2 HR: Performed by: ORTHOPAEDIC SURGERY

## 2019-01-01 PROCEDURE — 86870 RBC ANTIBODY IDENTIFICATION: CPT

## 2019-01-01 PROCEDURE — 74011000258 HC RX REV CODE- 258: Performed by: INTERNAL MEDICINE

## 2019-01-01 PROCEDURE — 85045 AUTOMATED RETICULOCYTE COUNT: CPT

## 2019-01-01 PROCEDURE — 65660000004 HC RM CVT STEPDOWN

## 2019-01-01 PROCEDURE — 82550 ASSAY OF CK (CPK): CPT

## 2019-01-01 PROCEDURE — 77030027138 HC INCENT SPIROMETER -A

## 2019-01-01 PROCEDURE — 86921 COMPATIBILITY TEST INCUBATE: CPT

## 2019-01-01 PROCEDURE — C1713 ANCHOR/SCREW BN/BN,TIS/BN: HCPCS | Performed by: ORTHOPAEDIC SURGERY

## 2019-01-01 PROCEDURE — 72128 CT CHEST SPINE W/O DYE: CPT

## 2019-01-01 PROCEDURE — 84439 ASSAY OF FREE THYROXINE: CPT

## 2019-01-01 PROCEDURE — 0PB40ZX EXCISION OF THORACIC VERTEBRA, OPEN APPROACH, DIAGNOSTIC: ICD-10-PCS | Performed by: ORTHOPAEDIC SURGERY

## 2019-01-01 PROCEDURE — 84443 ASSAY THYROID STIM HORMONE: CPT

## 2019-01-01 PROCEDURE — 94761 N-INVAS EAR/PLS OXIMETRY MLT: CPT

## 2019-01-01 PROCEDURE — 87070 CULTURE OTHR SPECIMN AEROBIC: CPT

## 2019-01-01 PROCEDURE — 93005 ELECTROCARDIOGRAM TRACING: CPT

## 2019-01-01 PROCEDURE — 74019 RADEX ABDOMEN 2 VIEWS: CPT

## 2019-01-01 PROCEDURE — 0PB40ZZ EXCISION OF THORACIC VERTEBRA, OPEN APPROACH: ICD-10-PCS | Performed by: ORTHOPAEDIC SURGERY

## 2019-01-01 PROCEDURE — 77062 BREAST TOMOSYNTHESIS BI: CPT

## 2019-01-01 PROCEDURE — 74011250637 HC RX REV CODE- 250/637: Performed by: EMERGENCY MEDICINE

## 2019-01-01 PROCEDURE — 74011000258 HC RX REV CODE- 258: Performed by: EMERGENCY MEDICINE

## 2019-01-01 PROCEDURE — 51798 US URINE CAPACITY MEASURE: CPT

## 2019-01-01 PROCEDURE — 0RG7071 FUSION OF 2 TO 7 THORACIC VERTEBRAL JOINTS WITH AUTOLOGOUS TISSUE SUBSTITUTE, POSTERIOR APPROACH, POSTERIOR COLUMN, OPEN APPROACH: ICD-10-PCS | Performed by: ORTHOPAEDIC SURGERY

## 2019-01-01 PROCEDURE — 77030012893: Performed by: ORTHOPAEDIC SURGERY

## 2019-01-01 PROCEDURE — 83540 ASSAY OF IRON: CPT

## 2019-01-01 PROCEDURE — 71045 X-RAY EXAM CHEST 1 VIEW: CPT

## 2019-01-01 PROCEDURE — 72100 X-RAY EXAM L-S SPINE 2/3 VWS: CPT

## 2019-01-01 PROCEDURE — 97163 PT EVAL HIGH COMPLEX 45 MIN: CPT

## 2019-01-01 PROCEDURE — 99285 EMERGENCY DEPT VISIT HI MDM: CPT

## 2019-01-01 PROCEDURE — 76060000036 HC ANESTHESIA 2.5 TO 3 HR: Performed by: ORTHOPAEDIC SURGERY

## 2019-01-01 PROCEDURE — 77030040922 HC BLNKT HYPOTHRM STRY -A: Performed by: ORTHOPAEDIC SURGERY

## 2019-01-01 PROCEDURE — 86922 COMPATIBILITY TEST ANTIGLOB: CPT

## 2019-01-01 PROCEDURE — 74011000250 HC RX REV CODE- 250

## 2019-01-01 PROCEDURE — 77030003029 HC SUT VCRL J&J -B: Performed by: ORTHOPAEDIC SURGERY

## 2019-01-01 PROCEDURE — 82947 ASSAY GLUCOSE BLOOD QUANT: CPT

## 2019-01-01 PROCEDURE — 77030012893

## 2019-01-01 PROCEDURE — 74011000258 HC RX REV CODE- 258

## 2019-01-01 PROCEDURE — 76010000132 HC OR TIME 2.5 TO 3 HR: Performed by: ORTHOPAEDIC SURGERY

## 2019-01-01 PROCEDURE — 74011250636 HC RX REV CODE- 250/636: Performed by: NURSE ANESTHETIST, CERTIFIED REGISTERED

## 2019-01-01 DEVICE — IMPLANTABLE DEVICE: Type: IMPLANTABLE DEVICE | Site: SPINE THORACIC | Status: FUNCTIONAL

## 2019-01-01 DEVICE — Z DUP USE 2531514 SCREW SPNL L40MM DIA6.5MM PEDCL POLYAX 2 LD THRD TOP LD FOR: Type: IMPLANTABLE DEVICE | Site: SPINE THORACIC | Status: FUNCTIONAL

## 2019-01-01 DEVICE — Z DUPLICATE USE 2531502 SCREW SPNL L40MM OD5.5MM TI CO CHROM LUM PEDCL POLYAX VAR: Type: IMPLANTABLE DEVICE | Site: SPINE THORACIC | Status: FUNCTIONAL

## 2019-01-01 RX ORDER — POTASSIUM CHLORIDE 7.45 MG/ML
10 INJECTION INTRAVENOUS
Status: DISCONTINUED | OUTPATIENT
Start: 2019-01-01 | End: 2019-01-01

## 2019-01-01 RX ORDER — DEXAMETHASONE SODIUM PHOSPHATE 4 MG/ML
4 INJECTION, SOLUTION INTRA-ARTICULAR; INTRALESIONAL; INTRAMUSCULAR; INTRAVENOUS; SOFT TISSUE
Status: COMPLETED | OUTPATIENT
Start: 2019-01-01 | End: 2019-01-01

## 2019-01-01 RX ORDER — LORAZEPAM 2 MG/ML
1 INJECTION INTRAMUSCULAR
Status: DISCONTINUED | OUTPATIENT
Start: 2019-01-01 | End: 2019-01-01

## 2019-01-01 RX ORDER — IBUPROFEN 200 MG
200 CAPSULE ORAL 2 TIMES DAILY
Status: DISCONTINUED | OUTPATIENT
Start: 2019-01-01 | End: 2020-01-01 | Stop reason: HOSPADM

## 2019-01-01 RX ORDER — HYDROMORPHONE HYDROCHLORIDE 1 MG/ML
.5-1 INJECTION, SOLUTION INTRAMUSCULAR; INTRAVENOUS; SUBCUTANEOUS
Status: DISCONTINUED | OUTPATIENT
Start: 2019-01-01 | End: 2019-01-01

## 2019-01-01 RX ORDER — ARFORMOTEROL TARTRATE 15 UG/2ML
15 SOLUTION RESPIRATORY (INHALATION)
Status: DISCONTINUED | OUTPATIENT
Start: 2019-01-01 | End: 2019-01-01 | Stop reason: HOSPADM

## 2019-01-01 RX ORDER — POLYETHYLENE GLYCOL 3350 17 G/17G
17 POWDER, FOR SOLUTION ORAL DAILY
Status: DISCONTINUED | OUTPATIENT
Start: 2019-01-01 | End: 2019-01-01 | Stop reason: HOSPADM

## 2019-01-01 RX ORDER — PEN NEEDLE, DIABETIC 29 G X1/2"
NEEDLE, DISPOSABLE MISCELLANEOUS
COMMUNITY
Start: 2019-01-01

## 2019-01-01 RX ORDER — PREDNISONE 20 MG/1
60 TABLET ORAL
Qty: 18 TAB | Refills: 0 | Status: SHIPPED | OUTPATIENT
Start: 2019-01-01 | End: 2019-01-01

## 2019-01-01 RX ORDER — ONDANSETRON 2 MG/ML
4 INJECTION INTRAMUSCULAR; INTRAVENOUS
Status: DISCONTINUED | OUTPATIENT
Start: 2019-01-01 | End: 2019-01-01

## 2019-01-01 RX ORDER — POTASSIUM CHLORIDE 20 MEQ/1
40 TABLET, EXTENDED RELEASE ORAL
Status: COMPLETED | OUTPATIENT
Start: 2019-01-01 | End: 2019-01-01

## 2019-01-01 RX ORDER — FAMOTIDINE 20 MG/1
20 TABLET, FILM COATED ORAL 2 TIMES DAILY
Status: DISCONTINUED | OUTPATIENT
Start: 2019-01-01 | End: 2019-01-01 | Stop reason: HOSPADM

## 2019-01-01 RX ORDER — LEVOTHYROXINE SODIUM 75 UG/1
75 TABLET ORAL
Status: DISCONTINUED | OUTPATIENT
Start: 2019-01-01 | End: 2019-01-01 | Stop reason: HOSPADM

## 2019-01-01 RX ORDER — OXYBUTYNIN CHLORIDE 5 MG/1
5 TABLET ORAL 2 TIMES DAILY
Status: DISCONTINUED | OUTPATIENT
Start: 2019-01-01 | End: 2019-01-01

## 2019-01-01 RX ORDER — INSULIN GLARGINE 100 [IU]/ML
22 INJECTION, SOLUTION SUBCUTANEOUS
Status: DISCONTINUED | OUTPATIENT
Start: 2019-01-01 | End: 2019-01-01

## 2019-01-01 RX ORDER — LANOLIN ALCOHOL/MO/W.PET/CERES
325 CREAM (GRAM) TOPICAL
Qty: 15 TAB | Refills: 0 | Status: ON HOLD
Start: 2019-01-01 | End: 2020-01-01 | Stop reason: CLARIF

## 2019-01-01 RX ORDER — OXYBUTYNIN CHLORIDE 5 MG/1
5 TABLET ORAL 2 TIMES DAILY
Status: DISCONTINUED | OUTPATIENT
Start: 2019-01-01 | End: 2020-01-01 | Stop reason: HOSPADM

## 2019-01-01 RX ORDER — INSULIN GLARGINE 100 [IU]/ML
25 INJECTION, SOLUTION SUBCUTANEOUS
Status: DISCONTINUED | OUTPATIENT
Start: 2019-01-01 | End: 2020-01-01

## 2019-01-01 RX ORDER — DIVALPROEX SODIUM 250 MG/1
250 TABLET, DELAYED RELEASE ORAL
Status: DISCONTINUED | OUTPATIENT
Start: 2019-01-01 | End: 2019-01-01 | Stop reason: HOSPADM

## 2019-01-01 RX ORDER — NALOXONE HYDROCHLORIDE 0.4 MG/ML
0.1 INJECTION, SOLUTION INTRAMUSCULAR; INTRAVENOUS; SUBCUTANEOUS AS NEEDED
Status: DISCONTINUED | OUTPATIENT
Start: 2019-01-01 | End: 2019-01-01 | Stop reason: HOSPADM

## 2019-01-01 RX ORDER — INSULIN GLARGINE 100 [IU]/ML
10 INJECTION, SOLUTION SUBCUTANEOUS DAILY
Status: DISCONTINUED | OUTPATIENT
Start: 2019-01-01 | End: 2019-01-01

## 2019-01-01 RX ORDER — ACETAMINOPHEN 325 MG/1
975 TABLET ORAL
Status: COMPLETED | OUTPATIENT
Start: 2019-01-01 | End: 2019-01-01

## 2019-01-01 RX ORDER — FENTANYL CITRATE 50 UG/ML
25 INJECTION, SOLUTION INTRAMUSCULAR; INTRAVENOUS AS NEEDED
Status: DISCONTINUED | OUTPATIENT
Start: 2019-01-01 | End: 2019-01-01 | Stop reason: HOSPADM

## 2019-01-01 RX ORDER — VANCOMYCIN HYDROCHLORIDE 1 G/20ML
INJECTION, POWDER, LYOPHILIZED, FOR SOLUTION INTRAVENOUS AS NEEDED
Status: DISCONTINUED | OUTPATIENT
Start: 2019-01-01 | End: 2019-01-01 | Stop reason: HOSPADM

## 2019-01-01 RX ORDER — GUAIFENESIN 100 MG/5ML
81 LIQUID (ML) ORAL
Status: DISCONTINUED | OUTPATIENT
Start: 2019-01-01 | End: 2020-01-01 | Stop reason: HOSPADM

## 2019-01-01 RX ORDER — PROPOFOL 10 MG/ML
INJECTION, EMULSION INTRAVENOUS AS NEEDED
Status: DISCONTINUED | OUTPATIENT
Start: 2019-01-01 | End: 2019-01-01 | Stop reason: HOSPADM

## 2019-01-01 RX ORDER — METHADONE HYDROCHLORIDE 5 MG/1
20 TABLET ORAL DAILY
Qty: 10 TAB | Refills: 0 | Status: ON HOLD
Start: 2019-01-01 | End: 2020-01-01

## 2019-01-01 RX ORDER — DIPHENHYDRAMINE HYDROCHLORIDE 50 MG/ML
12.5 INJECTION, SOLUTION INTRAMUSCULAR; INTRAVENOUS
Status: DISCONTINUED | OUTPATIENT
Start: 2019-01-01 | End: 2019-01-01

## 2019-01-01 RX ORDER — IPRATROPIUM BROMIDE AND ALBUTEROL SULFATE 2.5; .5 MG/3ML; MG/3ML
3 SOLUTION RESPIRATORY (INHALATION)
Status: COMPLETED | OUTPATIENT
Start: 2019-01-01 | End: 2019-01-01

## 2019-01-01 RX ORDER — MAGNESIUM SULFATE 1 G/100ML
1 INJECTION INTRAVENOUS ONCE
Status: COMPLETED | OUTPATIENT
Start: 2019-01-01 | End: 2019-01-01

## 2019-01-01 RX ORDER — ASCORBIC ACID 250 MG
250 TABLET ORAL
Status: DISCONTINUED | OUTPATIENT
Start: 2019-01-01 | End: 2020-01-01 | Stop reason: HOSPADM

## 2019-01-01 RX ORDER — ONDANSETRON 2 MG/ML
4 INJECTION INTRAMUSCULAR; INTRAVENOUS AS NEEDED
Status: COMPLETED | OUTPATIENT
Start: 2019-01-01 | End: 2019-01-01

## 2019-01-01 RX ORDER — SUCCINYLCHOLINE CHLORIDE 20 MG/ML
INJECTION INTRAMUSCULAR; INTRAVENOUS AS NEEDED
Status: DISCONTINUED | OUTPATIENT
Start: 2019-01-01 | End: 2019-01-01 | Stop reason: HOSPADM

## 2019-01-01 RX ORDER — ARIPIPRAZOLE 10 MG/1
10 TABLET ORAL DAILY
Status: DISCONTINUED | OUTPATIENT
Start: 2019-01-01 | End: 2019-01-01 | Stop reason: HOSPADM

## 2019-01-01 RX ORDER — CLONIDINE HYDROCHLORIDE 0.1 MG/1
0.1 TABLET ORAL
Qty: 20 TAB | Refills: 0 | Status: SHIPPED | OUTPATIENT
Start: 2019-01-01 | End: 2019-01-01

## 2019-01-01 RX ORDER — TRAZODONE HYDROCHLORIDE 50 MG/1
100 TABLET ORAL
Status: DISCONTINUED | OUTPATIENT
Start: 2019-01-01 | End: 2019-01-01

## 2019-01-01 RX ORDER — OXYCODONE AND ACETAMINOPHEN 10; 325 MG/1; MG/1
1 TABLET ORAL
Status: DISCONTINUED | OUTPATIENT
Start: 2019-01-01 | End: 2020-01-01 | Stop reason: HOSPADM

## 2019-01-01 RX ORDER — FAMOTIDINE 20 MG/1
TABLET, FILM COATED ORAL
Qty: 15 TAB | Refills: 0 | Status: ON HOLD | OUTPATIENT
Start: 2019-01-01 | End: 2020-01-01 | Stop reason: CLARIF

## 2019-01-01 RX ORDER — GUAIFENESIN 100 MG/5ML
LIQUID (ML) ORAL
Qty: 30 TAB | Refills: 0 | Status: ON HOLD | OUTPATIENT
Start: 2019-01-01 | End: 2019-01-01 | Stop reason: SDUPTHER

## 2019-01-01 RX ORDER — DEXTROSE 50 % IN WATER (D50W) INTRAVENOUS SYRINGE
25-50 AS NEEDED
Status: DISCONTINUED | OUTPATIENT
Start: 2019-01-01 | End: 2019-01-01

## 2019-01-01 RX ORDER — DIAZEPAM 5 MG/1
5 TABLET ORAL
Status: DISCONTINUED | OUTPATIENT
Start: 2019-01-01 | End: 2019-01-01 | Stop reason: HOSPADM

## 2019-01-01 RX ORDER — INSULIN GLARGINE 100 [IU]/ML
20 INJECTION, SOLUTION SUBCUTANEOUS
Status: DISCONTINUED | OUTPATIENT
Start: 2019-01-01 | End: 2019-01-01

## 2019-01-01 RX ORDER — DIPHENHYDRAMINE HYDROCHLORIDE 50 MG/ML
25 INJECTION, SOLUTION INTRAMUSCULAR; INTRAVENOUS ONCE
Status: COMPLETED | OUTPATIENT
Start: 2019-01-01 | End: 2019-01-01

## 2019-01-01 RX ORDER — MAGNESIUM SULFATE 100 %
4 CRYSTALS MISCELLANEOUS AS NEEDED
Status: DISCONTINUED | OUTPATIENT
Start: 2019-01-01 | End: 2019-01-01

## 2019-01-01 RX ORDER — IPRATROPIUM BROMIDE AND ALBUTEROL SULFATE 2.5; .5 MG/3ML; MG/3ML
3 SOLUTION RESPIRATORY (INHALATION)
Status: DISCONTINUED | OUTPATIENT
Start: 2019-01-01 | End: 2019-01-01

## 2019-01-01 RX ORDER — MORPHINE SULFATE 5 MG/ML
INJECTION, SOLUTION INTRAVENOUS
Status: DISCONTINUED | OUTPATIENT
Start: 2019-01-01 | End: 2019-01-01

## 2019-01-01 RX ORDER — SODIUM CHLORIDE 0.9 % (FLUSH) 0.9 %
5-40 SYRINGE (ML) INJECTION AS NEEDED
Status: DISCONTINUED | OUTPATIENT
Start: 2019-01-01 | End: 2019-01-01 | Stop reason: HOSPADM

## 2019-01-01 RX ORDER — NALBUPHINE HYDROCHLORIDE 10 MG/ML
5 INJECTION, SOLUTION INTRAMUSCULAR; INTRAVENOUS; SUBCUTANEOUS
Status: DISCONTINUED | OUTPATIENT
Start: 2019-01-01 | End: 2019-01-01 | Stop reason: HOSPADM

## 2019-01-01 RX ORDER — METHADONE HYDROCHLORIDE 10 MG/1
20 TABLET ORAL DAILY
Status: DISCONTINUED | OUTPATIENT
Start: 2019-01-01 | End: 2020-01-01 | Stop reason: HOSPADM

## 2019-01-01 RX ORDER — HYDROCODONE BITARTRATE AND ACETAMINOPHEN 5; 325 MG/1; MG/1
1 TABLET ORAL AS NEEDED
Status: DISCONTINUED | OUTPATIENT
Start: 2019-01-01 | End: 2019-01-01 | Stop reason: HOSPADM

## 2019-01-01 RX ORDER — LANOLIN ALCOHOL/MO/W.PET/CERES
1 CREAM (GRAM) TOPICAL
Status: DISCONTINUED | OUTPATIENT
Start: 2019-01-01 | End: 2019-01-01 | Stop reason: HOSPADM

## 2019-01-01 RX ORDER — DIPHENHYDRAMINE HYDROCHLORIDE 50 MG/ML
INJECTION, SOLUTION INTRAMUSCULAR; INTRAVENOUS
Status: COMPLETED
Start: 2019-01-01 | End: 2019-01-01

## 2019-01-01 RX ORDER — PREDNISONE 20 MG/1
40 TABLET ORAL DAILY
Qty: 20 TAB | Refills: 0 | Status: SHIPPED | OUTPATIENT
Start: 2019-01-01 | End: 2019-01-01

## 2019-01-01 RX ORDER — ONDANSETRON 2 MG/ML
INJECTION INTRAMUSCULAR; INTRAVENOUS AS NEEDED
Status: DISCONTINUED | OUTPATIENT
Start: 2019-01-01 | End: 2019-01-01 | Stop reason: HOSPADM

## 2019-01-01 RX ORDER — AZITHROMYCIN 250 MG/1
TABLET, FILM COATED ORAL
Qty: 6 TAB | Refills: 0 | OUTPATIENT
Start: 2019-01-01 | End: 2019-01-01

## 2019-01-01 RX ORDER — TRAMADOL HYDROCHLORIDE 50 MG/1
50 TABLET ORAL DAILY
COMMUNITY
Start: 2019-01-01 | End: 2019-01-01

## 2019-01-01 RX ORDER — DEXMEDETOMIDINE HYDROCHLORIDE 100 UG/ML
INJECTION, SOLUTION INTRAVENOUS AS NEEDED
Status: DISCONTINUED | OUTPATIENT
Start: 2019-01-01 | End: 2019-01-01 | Stop reason: HOSPADM

## 2019-01-01 RX ORDER — GUAIFENESIN 100 MG/5ML
81 LIQUID (ML) ORAL
Status: DISCONTINUED | OUTPATIENT
Start: 2019-01-01 | End: 2019-01-01

## 2019-01-01 RX ORDER — BUPRENORPHINE HYDROCHLORIDE AND NALOXONE HYDROCHLORIDE DIHYDRATE 8; 2 MG/1; MG/1
1 TABLET SUBLINGUAL DAILY
Status: DISCONTINUED | OUTPATIENT
Start: 2019-01-01 | End: 2019-01-01 | Stop reason: HOSPADM

## 2019-01-01 RX ORDER — VECURONIUM BROMIDE FOR INJECTION 1 MG/ML
INJECTION, POWDER, LYOPHILIZED, FOR SOLUTION INTRAVENOUS AS NEEDED
Status: DISCONTINUED | OUTPATIENT
Start: 2019-01-01 | End: 2019-01-01 | Stop reason: HOSPADM

## 2019-01-01 RX ORDER — FAMOTIDINE 20 MG/1
20 TABLET, FILM COATED ORAL DAILY
Qty: 15 TAB | Refills: 0 | Status: SHIPPED | OUTPATIENT
Start: 2019-01-01 | End: 2019-01-01

## 2019-01-01 RX ORDER — INSULIN GLARGINE 100 [IU]/ML
20 INJECTION, SOLUTION SUBCUTANEOUS DAILY
Status: DISCONTINUED | OUTPATIENT
Start: 2019-01-01 | End: 2019-01-01 | Stop reason: HOSPADM

## 2019-01-01 RX ORDER — MAGNESIUM SULFATE 100 %
16 CRYSTALS MISCELLANEOUS AS NEEDED
Status: DISCONTINUED | OUTPATIENT
Start: 2019-01-01 | End: 2019-01-01 | Stop reason: HOSPADM

## 2019-01-01 RX ORDER — NALOXONE HYDROCHLORIDE 0.4 MG/ML
0.4 INJECTION, SOLUTION INTRAMUSCULAR; INTRAVENOUS; SUBCUTANEOUS AS NEEDED
Status: DISCONTINUED | OUTPATIENT
Start: 2019-01-01 | End: 2019-01-01

## 2019-01-01 RX ORDER — METHOCARBAMOL 500 MG/1
500 TABLET, FILM COATED ORAL EVERY 8 HOURS
Status: DISCONTINUED | OUTPATIENT
Start: 2019-01-01 | End: 2020-01-01 | Stop reason: HOSPADM

## 2019-01-01 RX ORDER — SODIUM CHLORIDE, SODIUM LACTATE, POTASSIUM CHLORIDE, CALCIUM CHLORIDE 600; 310; 30; 20 MG/100ML; MG/100ML; MG/100ML; MG/100ML
50 INJECTION, SOLUTION INTRAVENOUS CONTINUOUS
Status: DISCONTINUED | OUTPATIENT
Start: 2019-01-01 | End: 2019-01-01

## 2019-01-01 RX ORDER — ROSUVASTATIN CALCIUM 5 MG/1
5 TABLET, COATED ORAL DAILY
Status: DISCONTINUED | OUTPATIENT
Start: 2019-01-01 | End: 2019-01-01 | Stop reason: CLARIF

## 2019-01-01 RX ORDER — INSULIN GLARGINE 100 [IU]/ML
10 INJECTION, SOLUTION SUBCUTANEOUS
Status: COMPLETED | OUTPATIENT
Start: 2019-01-01 | End: 2019-01-01

## 2019-01-01 RX ORDER — SODIUM CHLORIDE 0.9 % (FLUSH) 0.9 %
5-40 SYRINGE (ML) INJECTION EVERY 8 HOURS
Status: DISCONTINUED | OUTPATIENT
Start: 2019-01-01 | End: 2019-01-01

## 2019-01-01 RX ORDER — GUAIFENESIN 100 MG/5ML
LIQUID (ML) ORAL
Qty: 30 TAB | Refills: 0 | Status: SHIPPED | OUTPATIENT
Start: 2019-01-01 | End: 2019-01-01 | Stop reason: SDUPTHER

## 2019-01-01 RX ORDER — FLUTICASONE FUROATE AND VILANTEROL 100; 25 UG/1; UG/1
1 POWDER RESPIRATORY (INHALATION) DAILY
Status: DISCONTINUED | OUTPATIENT
Start: 2019-01-01 | End: 2020-01-01 | Stop reason: HOSPADM

## 2019-01-01 RX ORDER — AMOXICILLIN 250 MG
2 CAPSULE ORAL
Status: DISCONTINUED | OUTPATIENT
Start: 2019-01-01 | End: 2020-01-01 | Stop reason: HOSPADM

## 2019-01-01 RX ORDER — CLONIDINE HYDROCHLORIDE 0.1 MG/1
0.1 TABLET ORAL
Status: COMPLETED | OUTPATIENT
Start: 2019-01-01 | End: 2019-01-01

## 2019-01-01 RX ORDER — HYDROMORPHONE HYDROCHLORIDE 2 MG/ML
INJECTION, SOLUTION INTRAMUSCULAR; INTRAVENOUS; SUBCUTANEOUS AS NEEDED
Status: DISCONTINUED | OUTPATIENT
Start: 2019-01-01 | End: 2019-01-01 | Stop reason: HOSPADM

## 2019-01-01 RX ORDER — ACETAMINOPHEN 500 MG
1000 TABLET ORAL EVERY 6 HOURS
Status: DISCONTINUED | OUTPATIENT
Start: 2019-01-01 | End: 2019-01-01

## 2019-01-01 RX ORDER — SODIUM CHLORIDE 9 MG/ML
100 INJECTION, SOLUTION INTRAVENOUS CONTINUOUS
Status: DISCONTINUED | OUTPATIENT
Start: 2019-01-01 | End: 2019-01-01

## 2019-01-01 RX ORDER — INSULIN GLARGINE 100 [IU]/ML
20 INJECTION, SOLUTION SUBCUTANEOUS DAILY
Qty: 1 VIAL | Refills: 0 | Status: ON HOLD | OUTPATIENT
Start: 2019-01-01 | End: 2020-01-01 | Stop reason: CLARIF

## 2019-01-01 RX ORDER — FAMOTIDINE 20 MG/1
20 TABLET, FILM COATED ORAL 2 TIMES DAILY
Status: DISCONTINUED | OUTPATIENT
Start: 2019-01-01 | End: 2020-01-01

## 2019-01-01 RX ORDER — SODIUM CHLORIDE TAB 1 GM 1 G
1 TAB MISCELLANEOUS
Status: DISPENSED | OUTPATIENT
Start: 2019-01-01 | End: 2019-01-01

## 2019-01-01 RX ORDER — LORAZEPAM 2 MG/ML
1 INJECTION INTRAMUSCULAR
Status: COMPLETED | OUTPATIENT
Start: 2019-01-01 | End: 2019-01-01

## 2019-01-01 RX ORDER — INSULIN LISPRO 100 [IU]/ML
INJECTION, SOLUTION INTRAVENOUS; SUBCUTANEOUS
Status: DISCONTINUED | OUTPATIENT
Start: 2019-01-01 | End: 2019-01-01 | Stop reason: HOSPADM

## 2019-01-01 RX ORDER — INSULIN LISPRO 100 [IU]/ML
INJECTION, SOLUTION INTRAVENOUS; SUBCUTANEOUS ONCE
Status: DISCONTINUED | OUTPATIENT
Start: 2019-01-01 | End: 2019-01-01 | Stop reason: HOSPADM

## 2019-01-01 RX ORDER — AMOXICILLIN 250 MG
1 CAPSULE ORAL DAILY
Status: DISCONTINUED | OUTPATIENT
Start: 2019-01-01 | End: 2019-01-01 | Stop reason: HOSPADM

## 2019-01-01 RX ORDER — HYDROCORTISONE SODIUM SUCCINATE 100 MG/2ML
INJECTION, POWDER, FOR SOLUTION INTRAMUSCULAR; INTRAVENOUS AS NEEDED
Status: DISCONTINUED | OUTPATIENT
Start: 2019-01-01 | End: 2019-01-01 | Stop reason: HOSPADM

## 2019-01-01 RX ORDER — INSULIN LISPRO 100 [IU]/ML
12 INJECTION, SOLUTION INTRAVENOUS; SUBCUTANEOUS
Status: COMPLETED | OUTPATIENT
Start: 2019-01-01 | End: 2019-01-01

## 2019-01-01 RX ORDER — SODIUM CHLORIDE 0.9 % (FLUSH) 0.9 %
5-40 SYRINGE (ML) INJECTION AS NEEDED
Status: DISCONTINUED | OUTPATIENT
Start: 2019-01-01 | End: 2019-01-01

## 2019-01-01 RX ORDER — SODIUM CHLORIDE 0.9 % (FLUSH) 0.9 %
5-40 SYRINGE (ML) INJECTION EVERY 8 HOURS
Status: DISCONTINUED | OUTPATIENT
Start: 2019-01-01 | End: 2019-01-01 | Stop reason: HOSPADM

## 2019-01-01 RX ORDER — CYANOCOBALAMIN (VITAMIN B-12) 500 MCG
2000 TABLET ORAL DAILY
Status: DISCONTINUED | OUTPATIENT
Start: 2019-01-01 | End: 2020-01-01 | Stop reason: HOSPADM

## 2019-01-01 RX ORDER — IBUPROFEN 200 MG
1 TABLET ORAL DAILY
Status: DISCONTINUED | OUTPATIENT
Start: 2019-01-01 | End: 2019-01-01

## 2019-01-01 RX ORDER — LANOLIN ALCOHOL/MO/W.PET/CERES
1 CREAM (GRAM) TOPICAL
Status: DISCONTINUED | OUTPATIENT
Start: 2019-01-01 | End: 2020-01-01 | Stop reason: HOSPADM

## 2019-01-01 RX ORDER — ACETAMINOPHEN 325 MG/1
650 TABLET ORAL
Status: DISCONTINUED | OUTPATIENT
Start: 2019-01-01 | End: 2020-01-01 | Stop reason: HOSPADM

## 2019-01-01 RX ORDER — LUBIPROSTONE 24 UG/1
24 CAPSULE, GELATIN COATED ORAL
Status: DISCONTINUED | OUTPATIENT
Start: 2019-01-01 | End: 2020-01-01

## 2019-01-01 RX ORDER — INSULIN LISPRO 100 [IU]/ML
INJECTION, SOLUTION INTRAVENOUS; SUBCUTANEOUS EVERY 6 HOURS
Status: DISCONTINUED | OUTPATIENT
Start: 2019-01-01 | End: 2019-01-01

## 2019-01-01 RX ORDER — IBUPROFEN 200 MG
1 TABLET ORAL DAILY
Status: DISCONTINUED | OUTPATIENT
Start: 2019-01-01 | End: 2019-01-01 | Stop reason: HOSPADM

## 2019-01-01 RX ORDER — METAXALONE 800 MG/1
800 TABLET ORAL 4 TIMES DAILY
Qty: 20 TAB | Refills: 0 | Status: SHIPPED | OUTPATIENT
Start: 2019-01-01 | End: 2019-01-01

## 2019-01-01 RX ORDER — PREDNISONE 20 MG/1
40 TABLET ORAL
Status: DISCONTINUED | OUTPATIENT
Start: 2019-01-01 | End: 2019-01-01

## 2019-01-01 RX ORDER — MIDAZOLAM HYDROCHLORIDE 1 MG/ML
INJECTION, SOLUTION INTRAMUSCULAR; INTRAVENOUS AS NEEDED
Status: DISCONTINUED | OUTPATIENT
Start: 2019-01-01 | End: 2019-01-01 | Stop reason: HOSPADM

## 2019-01-01 RX ORDER — DIPHENHYDRAMINE HYDROCHLORIDE 50 MG/ML
12.5 INJECTION, SOLUTION INTRAMUSCULAR; INTRAVENOUS
Status: DISCONTINUED | OUTPATIENT
Start: 2019-01-01 | End: 2019-01-01 | Stop reason: HOSPADM

## 2019-01-01 RX ORDER — BISACODYL 5 MG
10 TABLET, DELAYED RELEASE (ENTERIC COATED) ORAL
Status: DISCONTINUED | OUTPATIENT
Start: 2019-01-01 | End: 2020-01-01 | Stop reason: HOSPADM

## 2019-01-01 RX ORDER — FAMOTIDINE 20 MG/1
20 TABLET, FILM COATED ORAL DAILY
Qty: 30 TAB | Refills: 1 | Status: ON HOLD | OUTPATIENT
Start: 2019-01-01 | End: 2020-01-01 | Stop reason: CLARIF

## 2019-01-01 RX ORDER — ACETAMINOPHEN 325 MG/1
650 TABLET ORAL EVERY 8 HOURS
Status: ON HOLD | COMMUNITY
End: 2020-01-01 | Stop reason: SDUPTHER

## 2019-01-01 RX ORDER — LEVOTHYROXINE SODIUM 100 UG/1
100 TABLET ORAL
Status: DISCONTINUED | OUTPATIENT
Start: 2019-01-01 | End: 2019-01-01

## 2019-01-01 RX ORDER — PREDNISONE 10 MG/1
TABLET ORAL
Qty: 18 TAB | Refills: 0 | OUTPATIENT
Start: 2019-01-01 | End: 2019-01-01

## 2019-01-01 RX ORDER — SERTRALINE HYDROCHLORIDE 50 MG/1
50 TABLET, FILM COATED ORAL DAILY
Status: DISCONTINUED | OUTPATIENT
Start: 2019-01-01 | End: 2019-01-01 | Stop reason: HOSPADM

## 2019-01-01 RX ORDER — OXYCODONE AND ACETAMINOPHEN 5; 325 MG/1; MG/1
1-2 TABLET ORAL
Status: DISCONTINUED | OUTPATIENT
Start: 2019-01-01 | End: 2019-01-01 | Stop reason: HOSPADM

## 2019-01-01 RX ORDER — FAMOTIDINE 10 MG/ML
20 INJECTION INTRAVENOUS EVERY 12 HOURS
Status: DISCONTINUED | OUTPATIENT
Start: 2019-01-01 | End: 2019-01-01 | Stop reason: CLARIF

## 2019-01-01 RX ORDER — GUAIFENESIN 100 MG/5ML
81 LIQUID (ML) ORAL DAILY
Qty: 30 TAB | Refills: 0 | Status: ON HOLD
Start: 2019-01-01 | End: 2020-01-01 | Stop reason: SDUPTHER

## 2019-01-01 RX ORDER — INSULIN LISPRO 100 [IU]/ML
INJECTION, SOLUTION INTRAVENOUS; SUBCUTANEOUS
Status: DISCONTINUED | OUTPATIENT
Start: 2019-01-01 | End: 2020-01-01 | Stop reason: HOSPADM

## 2019-01-01 RX ORDER — INSULIN LISPRO 100 [IU]/ML
3 INJECTION, SOLUTION INTRAVENOUS; SUBCUTANEOUS
Status: DISCONTINUED | OUTPATIENT
Start: 2019-01-01 | End: 2019-01-01

## 2019-01-01 RX ORDER — TRAZODONE HYDROCHLORIDE 100 MG/1
100 TABLET ORAL
Status: DISCONTINUED | OUTPATIENT
Start: 2019-01-01 | End: 2019-01-01 | Stop reason: HOSPADM

## 2019-01-01 RX ORDER — ROSUVASTATIN CALCIUM 5 MG/1
5 TABLET, COATED ORAL
Status: DISCONTINUED | OUTPATIENT
Start: 2019-01-01 | End: 2019-01-01 | Stop reason: HOSPADM

## 2019-01-01 RX ORDER — BUDESONIDE 1 MG/2ML
1000 INHALANT ORAL
Status: DISCONTINUED | OUTPATIENT
Start: 2019-01-01 | End: 2019-01-01 | Stop reason: HOSPADM

## 2019-01-01 RX ORDER — ROSUVASTATIN CALCIUM 5 MG/1
5 TABLET, COATED ORAL
Status: DISCONTINUED | OUTPATIENT
Start: 2019-01-01 | End: 2020-01-01 | Stop reason: HOSPADM

## 2019-01-01 RX ORDER — DEXTROSE 50 % IN WATER (D50W) INTRAVENOUS SYRINGE
25-50 AS NEEDED
Status: DISCONTINUED | OUTPATIENT
Start: 2019-01-01 | End: 2019-01-01 | Stop reason: CLARIF

## 2019-01-01 RX ORDER — OXYCODONE AND ACETAMINOPHEN 5; 325 MG/1; MG/1
1-2 TABLET ORAL
Qty: 10 TAB | Refills: 0 | Status: ON HOLD
Start: 2019-01-01 | End: 2020-01-01 | Stop reason: CLARIF

## 2019-01-01 RX ORDER — GUAIFENESIN 600 MG/1
600 TABLET, EXTENDED RELEASE ORAL EVERY 12 HOURS
Status: DISCONTINUED | OUTPATIENT
Start: 2019-01-01 | End: 2020-01-01 | Stop reason: HOSPADM

## 2019-01-01 RX ORDER — DEXTROSE MONOHYDRATE 100 MG/ML
125-250 INJECTION, SOLUTION INTRAVENOUS AS NEEDED
Status: DISCONTINUED | OUTPATIENT
Start: 2019-01-01 | End: 2019-01-01 | Stop reason: HOSPADM

## 2019-01-01 RX ORDER — LEVOTHYROXINE SODIUM 25 UG/1
75 TABLET ORAL
Status: DISCONTINUED | OUTPATIENT
Start: 2019-01-01 | End: 2020-01-01 | Stop reason: HOSPADM

## 2019-01-01 RX ORDER — POLYETHYLENE GLYCOL 3350 17 G/17G
17 POWDER, FOR SOLUTION ORAL DAILY
Qty: 289 G | Refills: 0 | Status: SHIPPED | OUTPATIENT
Start: 2019-01-01

## 2019-01-01 RX ORDER — POTASSIUM CHLORIDE 7.45 MG/ML
10 INJECTION INTRAVENOUS
Status: DISPENSED | OUTPATIENT
Start: 2019-01-01 | End: 2019-01-01

## 2019-01-01 RX ORDER — IBUPROFEN 200 MG
1 TABLET ORAL DAILY
Qty: 30 PATCH | Refills: 0 | Status: ON HOLD
Start: 2019-01-01 | End: 2020-01-01 | Stop reason: CLARIF

## 2019-01-01 RX ORDER — AMOXICILLIN 250 MG
1 CAPSULE ORAL DAILY
Qty: 10 TAB | Refills: 0 | Status: ON HOLD
Start: 2019-01-01 | End: 2020-01-01 | Stop reason: CLARIF

## 2019-01-01 RX ORDER — OXYBUTYNIN CHLORIDE 5 MG/1
5 TABLET ORAL 2 TIMES DAILY
Status: DISCONTINUED | OUTPATIENT
Start: 2019-01-01 | End: 2019-01-01 | Stop reason: HOSPADM

## 2019-01-01 RX ORDER — FACIAL-BODY WIPES
10 EACH TOPICAL DAILY PRN
Status: DISCONTINUED | OUTPATIENT
Start: 2019-01-01 | End: 2019-01-01 | Stop reason: HOSPADM

## 2019-01-01 RX ORDER — DOCUSATE SODIUM 100 MG/1
100 CAPSULE, LIQUID FILLED ORAL 2 TIMES DAILY
Qty: 30 CAP | Refills: 0 | Status: ON HOLD | OUTPATIENT
Start: 2019-01-01 | End: 2020-01-01 | Stop reason: CLARIF

## 2019-01-01 RX ORDER — NEOSTIGMINE METHYLSULFATE 1 MG/ML
INJECTION, SOLUTION INTRAVENOUS AS NEEDED
Status: DISCONTINUED | OUTPATIENT
Start: 2019-01-01 | End: 2019-01-01 | Stop reason: HOSPADM

## 2019-01-01 RX ORDER — INSULIN LISPRO 100 [IU]/ML
INJECTION, SOLUTION INTRAVENOUS; SUBCUTANEOUS ONCE
Status: DISCONTINUED | OUTPATIENT
Start: 2019-01-01 | End: 2019-01-01

## 2019-01-01 RX ORDER — ALBUTEROL SULFATE 0.83 MG/ML
2.5 SOLUTION RESPIRATORY (INHALATION)
Status: DISCONTINUED | OUTPATIENT
Start: 2019-01-01 | End: 2020-01-01 | Stop reason: HOSPADM

## 2019-01-01 RX ORDER — IPRATROPIUM BROMIDE AND ALBUTEROL SULFATE 2.5; .5 MG/3ML; MG/3ML
3 SOLUTION RESPIRATORY (INHALATION)
Status: DISCONTINUED | OUTPATIENT
Start: 2019-01-01 | End: 2019-01-01 | Stop reason: HOSPADM

## 2019-01-01 RX ORDER — IPRATROPIUM BROMIDE AND ALBUTEROL SULFATE 2.5; .5 MG/3ML; MG/3ML
3 SOLUTION RESPIRATORY (INHALATION) ONCE
Status: COMPLETED | OUTPATIENT
Start: 2019-01-01 | End: 2019-01-01

## 2019-01-01 RX ORDER — ARIPIPRAZOLE 10 MG/1
10 TABLET ORAL DAILY
Status: DISCONTINUED | OUTPATIENT
Start: 2019-01-01 | End: 2019-01-01

## 2019-01-01 RX ORDER — HYDRALAZINE HYDROCHLORIDE 20 MG/ML
10 INJECTION INTRAMUSCULAR; INTRAVENOUS ONCE
Status: COMPLETED | OUTPATIENT
Start: 2019-01-01 | End: 2019-01-01

## 2019-01-01 RX ORDER — ALLOPURINOL 100 MG/1
100 TABLET ORAL DAILY
Status: DISCONTINUED | OUTPATIENT
Start: 2019-01-01 | End: 2020-01-01 | Stop reason: HOSPADM

## 2019-01-01 RX ORDER — SERTRALINE HYDROCHLORIDE 50 MG/1
50 TABLET, FILM COATED ORAL DAILY
Status: DISCONTINUED | OUTPATIENT
Start: 2019-01-01 | End: 2020-01-01 | Stop reason: HOSPADM

## 2019-01-01 RX ORDER — SODIUM CHLORIDE 9 MG/ML
75 INJECTION, SOLUTION INTRAVENOUS CONTINUOUS
Status: DISCONTINUED | OUTPATIENT
Start: 2019-01-01 | End: 2019-01-01

## 2019-01-01 RX ORDER — PROMETHAZINE HYDROCHLORIDE 25 MG/1
25 TABLET ORAL
Qty: 15 TAB | Refills: 0 | Status: ON HOLD | OUTPATIENT
Start: 2019-01-01 | End: 2020-01-01 | Stop reason: CLARIF

## 2019-01-01 RX ORDER — GADOTERATE MEGLUMINE 376.9 MG/ML
15 INJECTION INTRAVENOUS
Status: COMPLETED | OUTPATIENT
Start: 2019-01-01 | End: 2019-01-01

## 2019-01-01 RX ORDER — INSULIN LISPRO 100 [IU]/ML
4 INJECTION, SOLUTION INTRAVENOUS; SUBCUTANEOUS
Status: DISCONTINUED | OUTPATIENT
Start: 2019-01-01 | End: 2020-01-01

## 2019-01-01 RX ORDER — BISACODYL 5 MG
10 TABLET, DELAYED RELEASE (ENTERIC COATED) ORAL DAILY
Status: DISCONTINUED | OUTPATIENT
Start: 2019-01-01 | End: 2019-01-01 | Stop reason: HOSPADM

## 2019-01-01 RX ORDER — OXYCODONE HYDROCHLORIDE 5 MG/1
5-10 TABLET ORAL
Status: DISCONTINUED | OUTPATIENT
Start: 2019-01-01 | End: 2019-01-01

## 2019-01-01 RX ORDER — DOCUSATE SODIUM 100 MG/1
100 CAPSULE, LIQUID FILLED ORAL
Status: DISCONTINUED | OUTPATIENT
Start: 2019-01-01 | End: 2019-01-01

## 2019-01-01 RX ORDER — DOXYCYCLINE HYCLATE 100 MG
100 TABLET ORAL 2 TIMES DAILY
Qty: 20 TAB | Refills: 0 | Status: SHIPPED | OUTPATIENT
Start: 2019-01-01 | End: 2019-01-01

## 2019-01-01 RX ORDER — HYDROMORPHONE HYDROCHLORIDE 2 MG/ML
0.5 INJECTION, SOLUTION INTRAMUSCULAR; INTRAVENOUS; SUBCUTANEOUS
Status: DISCONTINUED | OUTPATIENT
Start: 2019-01-01 | End: 2019-01-01 | Stop reason: HOSPADM

## 2019-01-01 RX ORDER — PREDNISONE 20 MG/1
20 TABLET ORAL DAILY
Qty: 5 TAB | Refills: 0 | OUTPATIENT
Start: 2019-01-01 | End: 2019-01-01

## 2019-01-01 RX ORDER — ONDANSETRON 2 MG/ML
4 INJECTION INTRAMUSCULAR; INTRAVENOUS
Status: DISCONTINUED | OUTPATIENT
Start: 2019-01-01 | End: 2019-01-01 | Stop reason: HOSPADM

## 2019-01-01 RX ORDER — FENTANYL CITRATE 50 UG/ML
INJECTION, SOLUTION INTRAMUSCULAR; INTRAVENOUS AS NEEDED
Status: DISCONTINUED | OUTPATIENT
Start: 2019-01-01 | End: 2019-01-01 | Stop reason: HOSPADM

## 2019-01-01 RX ORDER — BUPIVACAINE HYDROCHLORIDE 5 MG/ML
INJECTION, SOLUTION EPIDURAL; INTRACAUDAL AS NEEDED
Status: DISCONTINUED | OUTPATIENT
Start: 2019-01-01 | End: 2019-01-01 | Stop reason: HOSPADM

## 2019-01-01 RX ORDER — PREDNISONE 10 MG/1
30 TABLET ORAL
Qty: 10 TAB | Refills: 0 | Status: ON HOLD
Start: 2019-01-01 | End: 2020-01-01 | Stop reason: CLARIF

## 2019-01-01 RX ORDER — GLYCOPYRROLATE 0.2 MG/ML
INJECTION INTRAMUSCULAR; INTRAVENOUS AS NEEDED
Status: DISCONTINUED | OUTPATIENT
Start: 2019-01-01 | End: 2019-01-01 | Stop reason: HOSPADM

## 2019-01-01 RX ORDER — LIDOCAINE HYDROCHLORIDE 20 MG/ML
INJECTION, SOLUTION EPIDURAL; INFILTRATION; INTRACAUDAL; PERINEURAL AS NEEDED
Status: DISCONTINUED | OUTPATIENT
Start: 2019-01-01 | End: 2019-01-01 | Stop reason: HOSPADM

## 2019-01-01 RX ORDER — ONDANSETRON 2 MG/ML
4 INJECTION INTRAMUSCULAR; INTRAVENOUS ONCE
Status: DISCONTINUED | OUTPATIENT
Start: 2019-01-01 | End: 2019-01-01 | Stop reason: HOSPADM

## 2019-01-01 RX ORDER — DOXYCYCLINE 100 MG/1
100 CAPSULE ORAL
Status: COMPLETED | OUTPATIENT
Start: 2019-01-01 | End: 2019-01-01

## 2019-01-01 RX ORDER — EPHEDRINE SULFATE/0.9% NACL/PF 50 MG/5 ML
SYRINGE (ML) INTRAVENOUS AS NEEDED
Status: DISCONTINUED | OUTPATIENT
Start: 2019-01-01 | End: 2019-01-01 | Stop reason: HOSPADM

## 2019-01-01 RX ORDER — DOCUSATE SODIUM 100 MG/1
100 CAPSULE, LIQUID FILLED ORAL
Status: DISCONTINUED | OUTPATIENT
Start: 2019-01-01 | End: 2020-01-01 | Stop reason: HOSPADM

## 2019-01-01 RX ORDER — HYDROMORPHONE HYDROCHLORIDE 1 MG/ML
1 INJECTION, SOLUTION INTRAMUSCULAR; INTRAVENOUS; SUBCUTANEOUS
Status: DISCONTINUED | OUTPATIENT
Start: 2019-01-01 | End: 2019-01-01

## 2019-01-01 RX ORDER — MICONAZOLE NITRATE 2 %
1 CREAM WITH APPLICATOR VAGINAL 2 TIMES DAILY
Status: DISCONTINUED | OUTPATIENT
Start: 2019-01-01 | End: 2019-01-01

## 2019-01-01 RX ORDER — DIVALPROEX SODIUM 250 MG/1
500 TABLET, DELAYED RELEASE ORAL
Status: DISCONTINUED | OUTPATIENT
Start: 2019-01-01 | End: 2020-01-01

## 2019-01-01 RX ORDER — MICONAZOLE NITRATE 2 %
1 CREAM WITH APPLICATOR VAGINAL 2 TIMES DAILY
Status: DISCONTINUED | OUTPATIENT
Start: 2019-01-01 | End: 2019-01-01 | Stop reason: HOSPADM

## 2019-01-01 RX ORDER — ALLOPURINOL 100 MG/1
100 TABLET ORAL DAILY
Status: DISCONTINUED | OUTPATIENT
Start: 2019-01-01 | End: 2019-01-01 | Stop reason: HOSPADM

## 2019-01-01 RX ORDER — DOCUSATE SODIUM 100 MG/1
100 CAPSULE, LIQUID FILLED ORAL
Status: DISCONTINUED | OUTPATIENT
Start: 2019-01-01 | End: 2019-01-01 | Stop reason: HOSPADM

## 2019-01-01 RX ORDER — PROPOFOL 10 MG/ML
VIAL (ML) INTRAVENOUS
Status: DISCONTINUED | OUTPATIENT
Start: 2019-01-01 | End: 2019-01-01 | Stop reason: HOSPADM

## 2019-01-01 RX ADMIN — Medication 5 TABLET: at 08:17

## 2019-01-01 RX ADMIN — FENTANYL CITRATE 25 MCG: 50 INJECTION INTRAMUSCULAR; INTRAVENOUS at 11:24

## 2019-01-01 RX ADMIN — PREDNISONE 30 MG: 5 TABLET ORAL at 09:15

## 2019-01-01 RX ADMIN — ALLOPURINOL 100 MG: 100 TABLET ORAL at 08:18

## 2019-01-01 RX ADMIN — METHOCARBAMOL 500 MG: 500 TABLET, FILM COATED ORAL at 06:12

## 2019-01-01 RX ADMIN — LUBIPROSTONE 24 MCG: 24 CAPSULE, GELATIN COATED ORAL at 08:02

## 2019-01-01 RX ADMIN — DIVALPROEX SODIUM 250 MG: 250 TABLET, DELAYED RELEASE ORAL at 22:13

## 2019-01-01 RX ADMIN — SODIUM CHLORIDE 10 ML: 9 INJECTION, SOLUTION INTRAMUSCULAR; INTRAVENOUS; SUBCUTANEOUS at 22:00

## 2019-01-01 RX ADMIN — ALLOPURINOL 100 MG: 100 TABLET ORAL at 09:13

## 2019-01-01 RX ADMIN — OXYCODONE HYDROCHLORIDE AND ACETAMINOPHEN 2 TABLET: 5; 325 TABLET ORAL at 11:49

## 2019-01-01 RX ADMIN — INSULIN LISPRO 3 UNITS: 100 INJECTION, SOLUTION INTRAVENOUS; SUBCUTANEOUS at 11:34

## 2019-01-01 RX ADMIN — SERTRALINE HYDROCHLORIDE 50 MG: 50 TABLET ORAL at 09:49

## 2019-01-01 RX ADMIN — OXYCODONE HYDROCHLORIDE AND ACETAMINOPHEN 1 TABLET: 10; 325 TABLET ORAL at 15:10

## 2019-01-01 RX ADMIN — Medication 5 TABLET: at 10:12

## 2019-01-01 RX ADMIN — DIVALPROEX SODIUM 250 MG: 250 TABLET, DELAYED RELEASE ORAL at 21:42

## 2019-01-01 RX ADMIN — SODIUM CHLORIDE 10 ML: 9 INJECTION, SOLUTION INTRAMUSCULAR; INTRAVENOUS; SUBCUTANEOUS at 05:54

## 2019-01-01 RX ADMIN — SODIUM CHLORIDE 10 ML: 9 INJECTION, SOLUTION INTRAMUSCULAR; INTRAVENOUS; SUBCUTANEOUS at 14:51

## 2019-01-01 RX ADMIN — OXYBUTYNIN CHLORIDE 5 MG: 5 TABLET ORAL at 09:15

## 2019-01-01 RX ADMIN — METHOCARBAMOL 500 MG: 500 TABLET, FILM COATED ORAL at 06:27

## 2019-01-01 RX ADMIN — INSULIN GLARGINE 10 UNITS: 100 INJECTION, SOLUTION SUBCUTANEOUS at 12:58

## 2019-01-01 RX ADMIN — FENTANYL CITRATE 50 MCG: 50 INJECTION INTRAMUSCULAR; INTRAVENOUS at 10:57

## 2019-01-01 RX ADMIN — FAMOTIDINE 20 MG: 20 TABLET ORAL at 17:21

## 2019-01-01 RX ADMIN — LEVOTHYROXINE SODIUM 75 MCG: 25 TABLET ORAL at 05:06

## 2019-01-01 RX ADMIN — SENNOSIDES AND DOCUSATE SODIUM 2 TABLET: 8.6; 5 TABLET ORAL at 18:43

## 2019-01-01 RX ADMIN — Medication 1 TABLET: at 09:13

## 2019-01-01 RX ADMIN — BUDESONIDE 1000 MCG: 1 SUSPENSION RESPIRATORY (INHALATION) at 06:55

## 2019-01-01 RX ADMIN — SERTRALINE HYDROCHLORIDE 50 MG: 50 TABLET ORAL at 09:04

## 2019-01-01 RX ADMIN — INSULIN LISPRO 4 UNITS: 100 INJECTION, SOLUTION INTRAVENOUS; SUBCUTANEOUS at 17:45

## 2019-01-01 RX ADMIN — INSULIN LISPRO 4 UNITS: 100 INJECTION, SOLUTION INTRAVENOUS; SUBCUTANEOUS at 17:36

## 2019-01-01 RX ADMIN — SERTRALINE HYDROCHLORIDE 50 MG: 50 TABLET ORAL at 08:01

## 2019-01-01 RX ADMIN — INSULIN LISPRO 6 UNITS: 100 INJECTION, SOLUTION INTRAVENOUS; SUBCUTANEOUS at 17:22

## 2019-01-01 RX ADMIN — OXYBUTYNIN CHLORIDE 5 MG: 5 TABLET ORAL at 09:01

## 2019-01-01 RX ADMIN — OXYCODONE HYDROCHLORIDE AND ACETAMINOPHEN 2 TABLET: 5; 325 TABLET ORAL at 22:04

## 2019-01-01 RX ADMIN — GUAIFENESIN 600 MG: 600 TABLET, EXTENDED RELEASE ORAL at 09:16

## 2019-01-01 RX ADMIN — FAMOTIDINE 20 MG: 20 TABLET ORAL at 17:33

## 2019-01-01 RX ADMIN — OXYBUTYNIN CHLORIDE 5 MG: 5 TABLET ORAL at 17:38

## 2019-01-01 RX ADMIN — OXYCODONE HYDROCHLORIDE AND ACETAMINOPHEN 1 TABLET: 10; 325 TABLET ORAL at 18:45

## 2019-01-01 RX ADMIN — METHOCARBAMOL 500 MG: 500 TABLET, FILM COATED ORAL at 21:42

## 2019-01-01 RX ADMIN — ACETAMINOPHEN 1000 MG: 500 TABLET ORAL at 11:42

## 2019-01-01 RX ADMIN — Medication 5 TABLET: at 08:01

## 2019-01-01 RX ADMIN — ROSUVASTATIN CALCIUM 5 MG: 10 TABLET, FILM COATED ORAL at 02:45

## 2019-01-01 RX ADMIN — OXYCODONE HYDROCHLORIDE AND ACETAMINOPHEN 1 TABLET: 10; 325 TABLET ORAL at 22:13

## 2019-01-01 RX ADMIN — GUAIFENESIN 600 MG: 600 TABLET, EXTENDED RELEASE ORAL at 10:08

## 2019-01-01 RX ADMIN — METHADONE HYDROCHLORIDE 20 MG: 10 TABLET ORAL at 08:22

## 2019-01-01 RX ADMIN — OXYCODONE HYDROCHLORIDE AND ACETAMINOPHEN 2 TABLET: 5; 325 TABLET ORAL at 01:49

## 2019-01-01 RX ADMIN — DIVALPROEX SODIUM 500 MG: 250 TABLET, DELAYED RELEASE ORAL at 21:45

## 2019-01-01 RX ADMIN — BUDESONIDE 1000 MCG: 1 SUSPENSION RESPIRATORY (INHALATION) at 09:05

## 2019-01-01 RX ADMIN — Medication 250 MG: at 08:05

## 2019-01-01 RX ADMIN — ROSUVASTATIN CALCIUM 5 MG: 10 TABLET, FILM COATED ORAL at 21:51

## 2019-01-01 RX ADMIN — MAGNESIUM SULFATE 1 G: 1 INJECTION INTRAVENOUS at 10:11

## 2019-01-01 RX ADMIN — ALLOPURINOL 100 MG: 100 TABLET ORAL at 08:21

## 2019-01-01 RX ADMIN — FAMOTIDINE 20 MG: 20 TABLET ORAL at 10:11

## 2019-01-01 RX ADMIN — INSULIN LISPRO 3 UNITS: 100 INJECTION, SOLUTION INTRAVENOUS; SUBCUTANEOUS at 12:03

## 2019-01-01 RX ADMIN — ROSUVASTATIN CALCIUM 5 MG: 10 TABLET, FILM COATED ORAL at 22:18

## 2019-01-01 RX ADMIN — DIVALPROEX SODIUM 250 MG: 250 TABLET, DELAYED RELEASE ORAL at 21:13

## 2019-01-01 RX ADMIN — INSULIN GLARGINE 20 UNITS: 100 INJECTION, SOLUTION SUBCUTANEOUS at 08:17

## 2019-01-01 RX ADMIN — METHOCARBAMOL 500 MG: 500 TABLET, FILM COATED ORAL at 05:32

## 2019-01-01 RX ADMIN — FAMOTIDINE 20 MG: 20 TABLET ORAL at 17:35

## 2019-01-01 RX ADMIN — SERTRALINE HYDROCHLORIDE 50 MG: 50 TABLET ORAL at 09:15

## 2019-01-01 RX ADMIN — METHOCARBAMOL 500 MG: 500 TABLET, FILM COATED ORAL at 06:10

## 2019-01-01 RX ADMIN — GUAIFENESIN 600 MG: 600 TABLET, EXTENDED RELEASE ORAL at 09:15

## 2019-01-01 RX ADMIN — SODIUM CHLORIDE 250 MG: 900 INJECTION, SOLUTION INTRAVENOUS at 01:55

## 2019-01-01 RX ADMIN — GUAIFENESIN 600 MG: 600 TABLET, EXTENDED RELEASE ORAL at 21:51

## 2019-01-01 RX ADMIN — ASPIRIN 81 MG 81 MG: 81 TABLET ORAL at 10:13

## 2019-01-01 RX ADMIN — ARFORMOTEROL TARTRATE 15 MCG: 15 SOLUTION RESPIRATORY (INHALATION) at 06:55

## 2019-01-01 RX ADMIN — LEVOTHYROXINE SODIUM 75 MCG: 25 TABLET ORAL at 06:10

## 2019-01-01 RX ADMIN — CALCIUM CITRATE 200 MG (950 MG) TABLET 200 MG: at 19:19

## 2019-01-01 RX ADMIN — ROSUVASTATIN CALCIUM 5 MG: 10 TABLET, FILM COATED ORAL at 21:59

## 2019-01-01 RX ADMIN — CALCIUM CITRATE 200 MG (950 MG) TABLET 200 MG: at 08:23

## 2019-01-01 RX ADMIN — METHOCARBAMOL 500 MG: 500 TABLET, FILM COATED ORAL at 13:17

## 2019-01-01 RX ADMIN — BUDESONIDE 1000 MCG: 1 SUSPENSION RESPIRATORY (INHALATION) at 22:25

## 2019-01-01 RX ADMIN — SENNOSIDES AND DOCUSATE SODIUM 2 TABLET: 8.6; 5 TABLET ORAL at 17:27

## 2019-01-01 RX ADMIN — ALLOPURINOL 100 MG: 100 TABLET ORAL at 09:09

## 2019-01-01 RX ADMIN — SODIUM CHLORIDE 100 ML/HR: 900 INJECTION, SOLUTION INTRAVENOUS at 15:10

## 2019-01-01 RX ADMIN — ASPIRIN 81 MG 81 MG: 81 TABLET ORAL at 08:24

## 2019-01-01 RX ADMIN — PREDNISONE 30 MG: 5 TABLET ORAL at 08:40

## 2019-01-01 RX ADMIN — METHOCARBAMOL 500 MG: 500 TABLET, FILM COATED ORAL at 13:43

## 2019-01-01 RX ADMIN — INSULIN LISPRO 4 UNITS: 100 INJECTION, SOLUTION INTRAVENOUS; SUBCUTANEOUS at 17:35

## 2019-01-01 RX ADMIN — ASPIRIN 81 MG 81 MG: 81 TABLET ORAL at 10:08

## 2019-01-01 RX ADMIN — METHOCARBAMOL 500 MG: 500 TABLET, FILM COATED ORAL at 13:15

## 2019-01-01 RX ADMIN — INSULIN LISPRO 8 UNITS: 100 INJECTION, SOLUTION INTRAVENOUS; SUBCUTANEOUS at 17:43

## 2019-01-01 RX ADMIN — CALCIUM CITRATE 200 MG (950 MG) TABLET 200 MG: at 10:23

## 2019-01-01 RX ADMIN — DOCUSATE SODIUM 100 MG: 100 CAPSULE, LIQUID FILLED ORAL at 10:08

## 2019-01-01 RX ADMIN — FAMOTIDINE 20 MG: 20 TABLET ORAL at 10:31

## 2019-01-01 RX ADMIN — VECURONIUM BROMIDE 2 MG: 1 INJECTION, POWDER, LYOPHILIZED, FOR SOLUTION INTRAVENOUS at 11:13

## 2019-01-01 RX ADMIN — DOCUSATE SODIUM 100 MG: 100 CAPSULE, LIQUID FILLED ORAL at 08:41

## 2019-01-01 RX ADMIN — FERROUS SULFATE TAB 325 MG (65 MG ELEMENTAL FE) 325 MG: 325 (65 FE) TAB at 10:31

## 2019-01-01 RX ADMIN — INSULIN LISPRO 4 UNITS: 100 INJECTION, SOLUTION INTRAVENOUS; SUBCUTANEOUS at 09:13

## 2019-01-01 RX ADMIN — SERTRALINE HYDROCHLORIDE 50 MG: 50 TABLET ORAL at 08:18

## 2019-01-01 RX ADMIN — DOCUSATE SODIUM 100 MG: 100 CAPSULE, LIQUID FILLED ORAL at 08:18

## 2019-01-01 RX ADMIN — FLUTICASONE FUROATE AND VILANTEROL TRIFENATATE 1 PUFF: 100; 25 POWDER RESPIRATORY (INHALATION) at 08:06

## 2019-01-01 RX ADMIN — METHOCARBAMOL 500 MG: 500 TABLET, FILM COATED ORAL at 13:51

## 2019-01-01 RX ADMIN — SENNOSIDES AND DOCUSATE SODIUM 2 TABLET: 8.6; 5 TABLET ORAL at 17:14

## 2019-01-01 RX ADMIN — CALCIUM CITRATE 200 MG (950 MG) TABLET 200 MG: at 18:05

## 2019-01-01 RX ADMIN — OXYCODONE HYDROCHLORIDE AND ACETAMINOPHEN 2 TABLET: 5; 325 TABLET ORAL at 00:26

## 2019-01-01 RX ADMIN — ALBUTEROL SULFATE 2.5 MG: 2.5 SOLUTION RESPIRATORY (INHALATION) at 14:39

## 2019-01-01 RX ADMIN — OXYBUTYNIN CHLORIDE 5 MG: 5 TABLET ORAL at 08:40

## 2019-01-01 RX ADMIN — Medication 5 TABLET: at 08:05

## 2019-01-01 RX ADMIN — DIVALPROEX SODIUM 500 MG: 250 TABLET, DELAYED RELEASE ORAL at 21:59

## 2019-01-01 RX ADMIN — DIVALPROEX SODIUM 500 MG: 250 TABLET, DELAYED RELEASE ORAL at 21:50

## 2019-01-01 RX ADMIN — LEVOTHYROXINE SODIUM 75 MCG: 75 TABLET ORAL at 05:44

## 2019-01-01 RX ADMIN — OXYBUTYNIN CHLORIDE 5 MG: 5 TABLET ORAL at 08:38

## 2019-01-01 RX ADMIN — SERTRALINE HYDROCHLORIDE 50 MG: 50 TABLET ORAL at 10:11

## 2019-01-01 RX ADMIN — FAMOTIDINE 20 MG: 20 TABLET ORAL at 17:37

## 2019-01-01 RX ADMIN — OXYCODONE HYDROCHLORIDE AND ACETAMINOPHEN 1 TABLET: 10; 325 TABLET ORAL at 15:49

## 2019-01-01 RX ADMIN — INSULIN GLARGINE 20 UNITS: 100 INJECTION, SOLUTION SUBCUTANEOUS at 09:12

## 2019-01-01 RX ADMIN — GUAIFENESIN 600 MG: 600 TABLET, EXTENDED RELEASE ORAL at 22:42

## 2019-01-01 RX ADMIN — INSULIN LISPRO 4 UNITS: 100 INJECTION, SOLUTION INTRAVENOUS; SUBCUTANEOUS at 08:18

## 2019-01-01 RX ADMIN — CALCIUM CITRATE 200 MG (950 MG) TABLET 200 MG: at 17:27

## 2019-01-01 RX ADMIN — INSULIN GLARGINE 25 UNITS: 100 INJECTION, SOLUTION SUBCUTANEOUS at 09:20

## 2019-01-01 RX ADMIN — METHOCARBAMOL 500 MG: 500 TABLET, FILM COATED ORAL at 22:13

## 2019-01-01 RX ADMIN — METHOCARBAMOL 500 MG: 500 TABLET, FILM COATED ORAL at 22:18

## 2019-01-01 RX ADMIN — OXYCODONE HYDROCHLORIDE AND ACETAMINOPHEN 1 TABLET: 10; 325 TABLET ORAL at 18:44

## 2019-01-01 RX ADMIN — LUBIPROSTONE 24 MCG: 24 CAPSULE, GELATIN COATED ORAL at 09:09

## 2019-01-01 RX ADMIN — LUBIPROSTONE 24 MCG: 24 CAPSULE, GELATIN COATED ORAL at 08:21

## 2019-01-01 RX ADMIN — HYDROMORPHONE HYDROCHLORIDE 0.5 MG: 2 INJECTION, SOLUTION INTRAMUSCULAR; INTRAVENOUS; SUBCUTANEOUS at 12:51

## 2019-01-01 RX ADMIN — FERROUS SULFATE TAB 325 MG (65 MG ELEMENTAL FE) 325 MG: 325 (65 FE) TAB at 09:16

## 2019-01-01 RX ADMIN — ONDANSETRON 4 MG: 2 INJECTION INTRAMUSCULAR; INTRAVENOUS at 12:43

## 2019-01-01 RX ADMIN — METHADONE HYDROCHLORIDE 25 MG: 5 TABLET ORAL at 09:13

## 2019-01-01 RX ADMIN — CEFEPIME 1 G: 1 INJECTION, POWDER, FOR SOLUTION INTRAMUSCULAR; INTRAVENOUS at 12:58

## 2019-01-01 RX ADMIN — FLUTICASONE FUROATE AND VILANTEROL TRIFENATATE 1 PUFF: 100; 25 POWDER RESPIRATORY (INHALATION) at 09:20

## 2019-01-01 RX ADMIN — ASPIRIN 81 MG 81 MG: 81 TABLET ORAL at 09:03

## 2019-01-01 RX ADMIN — ARFORMOTEROL TARTRATE 15 MCG: 15 SOLUTION RESPIRATORY (INHALATION) at 08:16

## 2019-01-01 RX ADMIN — INSULIN LISPRO 3 UNITS: 100 INJECTION, SOLUTION INTRAVENOUS; SUBCUTANEOUS at 17:18

## 2019-01-01 RX ADMIN — FLUTICASONE FUROATE AND VILANTEROL TRIFENATATE 1 PUFF: 100; 25 POWDER RESPIRATORY (INHALATION) at 09:18

## 2019-01-01 RX ADMIN — OXYCODONE HYDROCHLORIDE AND ACETAMINOPHEN 1 TABLET: 10; 325 TABLET ORAL at 13:58

## 2019-01-01 RX ADMIN — LEVOTHYROXINE SODIUM 75 MCG: 75 TABLET ORAL at 05:40

## 2019-01-01 RX ADMIN — PREDNISONE 40 MG: 20 TABLET ORAL at 09:01

## 2019-01-01 RX ADMIN — INSULIN LISPRO 4 UNITS: 100 INJECTION, SOLUTION INTRAVENOUS; SUBCUTANEOUS at 16:16

## 2019-01-01 RX ADMIN — FLUTICASONE FUROATE AND VILANTEROL TRIFENATATE 1 PUFF: 100; 25 POWDER RESPIRATORY (INHALATION) at 10:13

## 2019-01-01 RX ADMIN — OXYCODONE HYDROCHLORIDE AND ACETAMINOPHEN 1 TABLET: 10; 325 TABLET ORAL at 02:26

## 2019-01-01 RX ADMIN — INSULIN GLARGINE 22 UNITS: 100 INJECTION, SOLUTION SUBCUTANEOUS at 08:06

## 2019-01-01 RX ADMIN — ASPIRIN 81 MG 81 MG: 81 TABLET ORAL at 09:16

## 2019-01-01 RX ADMIN — INSULIN LISPRO 6 UNITS: 100 INJECTION, SOLUTION INTRAVENOUS; SUBCUTANEOUS at 16:17

## 2019-01-01 RX ADMIN — SODIUM CHLORIDE 1000 MG: 900 INJECTION, SOLUTION INTRAVENOUS at 23:13

## 2019-01-01 RX ADMIN — ALLOPURINOL 100 MG: 100 TABLET ORAL at 08:38

## 2019-01-01 RX ADMIN — Medication 250 MG: at 09:11

## 2019-01-01 RX ADMIN — INSULIN LISPRO 3 UNITS: 100 INJECTION, SOLUTION INTRAVENOUS; SUBCUTANEOUS at 17:17

## 2019-01-01 RX ADMIN — INSULIN LISPRO 2 UNITS: 100 INJECTION, SOLUTION INTRAVENOUS; SUBCUTANEOUS at 08:00

## 2019-01-01 RX ADMIN — FENTANYL CITRATE 25 MCG: 50 INJECTION INTRAMUSCULAR; INTRAVENOUS at 12:47

## 2019-01-01 RX ADMIN — FERROUS SULFATE TAB 325 MG (65 MG ELEMENTAL FE) 325 MG: 325 (65 FE) TAB at 08:02

## 2019-01-01 RX ADMIN — SENNOSIDES AND DOCUSATE SODIUM 2 TABLET: 8.6; 5 TABLET ORAL at 17:16

## 2019-01-01 RX ADMIN — OXYBUTYNIN CHLORIDE 5 MG: 5 TABLET ORAL at 08:22

## 2019-01-01 RX ADMIN — Medication 1 TABLET: at 18:02

## 2019-01-01 RX ADMIN — ROSUVASTATIN CALCIUM 5 MG: 10 TABLET, FILM COATED ORAL at 21:42

## 2019-01-01 RX ADMIN — PHENYLEPHRINE HYDROCHLORIDE 100 MCG: 10 INJECTION INTRAVENOUS at 11:15

## 2019-01-01 RX ADMIN — INSULIN LISPRO 4 UNITS: 100 INJECTION, SOLUTION INTRAVENOUS; SUBCUTANEOUS at 17:31

## 2019-01-01 RX ADMIN — DOXYCYCLINE HYCLATE 100 MG: 100 CAPSULE ORAL at 18:41

## 2019-01-01 RX ADMIN — OXYBUTYNIN CHLORIDE 5 MG: 5 TABLET ORAL at 18:47

## 2019-01-01 RX ADMIN — OXYCODONE HYDROCHLORIDE AND ACETAMINOPHEN 1 TABLET: 10; 325 TABLET ORAL at 21:34

## 2019-01-01 RX ADMIN — LEVOTHYROXINE SODIUM 75 MCG: 25 TABLET ORAL at 05:35

## 2019-01-01 RX ADMIN — IPRATROPIUM BROMIDE AND ALBUTEROL SULFATE 3 ML: .5; 3 SOLUTION RESPIRATORY (INHALATION) at 17:06

## 2019-01-01 RX ADMIN — OXYCODONE HYDROCHLORIDE AND ACETAMINOPHEN 1 TABLET: 10; 325 TABLET ORAL at 21:42

## 2019-01-01 RX ADMIN — ASPIRIN 81 MG 81 MG: 81 TABLET ORAL at 09:14

## 2019-01-01 RX ADMIN — INSULIN LISPRO 4 UNITS: 100 INJECTION, SOLUTION INTRAVENOUS; SUBCUTANEOUS at 12:31

## 2019-01-01 RX ADMIN — PREDNISONE 30 MG: 5 TABLET ORAL at 09:49

## 2019-01-01 RX ADMIN — BISACODYL 10 MG: 5 TABLET, COATED ORAL at 08:21

## 2019-01-01 RX ADMIN — FAMOTIDINE 20 MG: 20 TABLET ORAL at 17:26

## 2019-01-01 RX ADMIN — OXYCODONE HYDROCHLORIDE AND ACETAMINOPHEN 1 TABLET: 10; 325 TABLET ORAL at 23:27

## 2019-01-01 RX ADMIN — PREDNISONE 30 MG: 5 TABLET ORAL at 08:22

## 2019-01-01 RX ADMIN — ASPIRIN 81 MG 81 MG: 81 TABLET ORAL at 09:12

## 2019-01-01 RX ADMIN — INSULIN LISPRO 2 UNITS: 100 INJECTION, SOLUTION INTRAVENOUS; SUBCUTANEOUS at 13:19

## 2019-01-01 RX ADMIN — PREDNISONE 30 MG: 10 TABLET ORAL at 09:03

## 2019-01-01 RX ADMIN — DIVALPROEX SODIUM 500 MG: 250 TABLET, DELAYED RELEASE ORAL at 21:42

## 2019-01-01 RX ADMIN — LEVOTHYROXINE SODIUM 75 MCG: 25 TABLET ORAL at 06:27

## 2019-01-01 RX ADMIN — METHADONE HYDROCHLORIDE 20 MG: 10 TABLET ORAL at 10:30

## 2019-01-01 RX ADMIN — FERROUS SULFATE TAB 325 MG (65 MG ELEMENTAL FE) 325 MG: 325 (65 FE) TAB at 08:23

## 2019-01-01 RX ADMIN — LEVOTHYROXINE SODIUM 75 MCG: 75 TABLET ORAL at 06:18

## 2019-01-01 RX ADMIN — DIVALPROEX SODIUM 500 MG: 250 TABLET, DELAYED RELEASE ORAL at 22:13

## 2019-01-01 RX ADMIN — GLYCOPYRROLATE 0.4 MG: 0.2 INJECTION INTRAMUSCULAR; INTRAVENOUS at 12:56

## 2019-01-01 RX ADMIN — FAMOTIDINE 20 MG: 20 TABLET ORAL at 09:15

## 2019-01-01 RX ADMIN — OXYBUTYNIN CHLORIDE 5 MG: 5 TABLET ORAL at 08:18

## 2019-01-01 RX ADMIN — INSULIN LISPRO 4 UNITS: 100 INJECTION, SOLUTION INTRAVENOUS; SUBCUTANEOUS at 17:27

## 2019-01-01 RX ADMIN — OXYCODONE HYDROCHLORIDE AND ACETAMINOPHEN 1 TABLET: 10; 325 TABLET ORAL at 07:38

## 2019-01-01 RX ADMIN — SENNOSIDES AND DOCUSATE SODIUM 2 TABLET: 8.6; 5 TABLET ORAL at 18:05

## 2019-01-01 RX ADMIN — LUBIPROSTONE 24 MCG: 24 CAPSULE, GELATIN COATED ORAL at 09:15

## 2019-01-01 RX ADMIN — SERTRALINE HYDROCHLORIDE 50 MG: 50 TABLET ORAL at 08:37

## 2019-01-01 RX ADMIN — OXYBUTYNIN CHLORIDE 5 MG: 5 TABLET ORAL at 08:24

## 2019-01-01 RX ADMIN — METHOCARBAMOL 500 MG: 500 TABLET, FILM COATED ORAL at 21:34

## 2019-01-01 RX ADMIN — OXYCODONE HYDROCHLORIDE AND ACETAMINOPHEN 2 TABLET: 5; 325 TABLET ORAL at 09:00

## 2019-01-01 RX ADMIN — INSULIN LISPRO 4 UNITS: 100 INJECTION, SOLUTION INTRAVENOUS; SUBCUTANEOUS at 12:06

## 2019-01-01 RX ADMIN — SERTRALINE HYDROCHLORIDE 50 MG: 50 TABLET ORAL at 08:41

## 2019-01-01 RX ADMIN — GUAIFENESIN 600 MG: 600 TABLET, EXTENDED RELEASE ORAL at 21:50

## 2019-01-01 RX ADMIN — DIVALPROEX SODIUM 500 MG: 250 TABLET, DELAYED RELEASE ORAL at 21:53

## 2019-01-01 RX ADMIN — METHOCARBAMOL 500 MG: 500 TABLET, FILM COATED ORAL at 13:07

## 2019-01-01 RX ADMIN — INSULIN LISPRO 4 UNITS: 100 INJECTION, SOLUTION INTRAVENOUS; SUBCUTANEOUS at 19:20

## 2019-01-01 RX ADMIN — CALCIUM CITRATE 200 MG (950 MG) TABLET 200 MG: at 17:14

## 2019-01-01 RX ADMIN — Medication 5 TABLET: at 09:11

## 2019-01-01 RX ADMIN — POTASSIUM CHLORIDE: 2 INJECTION, SOLUTION, CONCENTRATE INTRAVENOUS at 21:38

## 2019-01-01 RX ADMIN — FERROUS SULFATE TAB 325 MG (65 MG ELEMENTAL FE) 325 MG: 325 (65 FE) TAB at 08:41

## 2019-01-01 RX ADMIN — SODIUM CHLORIDE 250 MG: 900 INJECTION, SOLUTION INTRAVENOUS at 18:23

## 2019-01-01 RX ADMIN — ROSUVASTATIN CALCIUM 5 MG: 10 TABLET, FILM COATED ORAL at 21:53

## 2019-01-01 RX ADMIN — PREDNISONE 30 MG: 5 TABLET ORAL at 08:17

## 2019-01-01 RX ADMIN — ALLOPURINOL 100 MG: 100 TABLET ORAL at 10:41

## 2019-01-01 RX ADMIN — ROSUVASTATIN CALCIUM 5 MG: 10 TABLET, FILM COATED ORAL at 21:34

## 2019-01-01 RX ADMIN — ARFORMOTEROL TARTRATE 15 MCG: 15 SOLUTION RESPIRATORY (INHALATION) at 09:05

## 2019-01-01 RX ADMIN — ASPIRIN 81 MG 81 MG: 81 TABLET ORAL at 08:05

## 2019-01-01 RX ADMIN — LEVOTHYROXINE SODIUM 75 MCG: 25 TABLET ORAL at 07:11

## 2019-01-01 RX ADMIN — SODIUM CHLORIDE 1000 MG: 900 INJECTION, SOLUTION INTRAVENOUS at 01:25

## 2019-01-01 RX ADMIN — OXYBUTYNIN CHLORIDE 5 MG: 5 TABLET ORAL at 08:05

## 2019-01-01 RX ADMIN — OXYCODONE HYDROCHLORIDE AND ACETAMINOPHEN 2 TABLET: 5; 325 TABLET ORAL at 22:17

## 2019-01-01 RX ADMIN — ROSUVASTATIN CALCIUM 5 MG: 10 TABLET, FILM COATED ORAL at 22:03

## 2019-01-01 RX ADMIN — OXYCODONE HYDROCHLORIDE AND ACETAMINOPHEN 2 TABLET: 5; 325 TABLET ORAL at 16:20

## 2019-01-01 RX ADMIN — OXYBUTYNIN CHLORIDE 5 MG: 5 TABLET ORAL at 18:02

## 2019-01-01 RX ADMIN — OXYCODONE HYDROCHLORIDE AND ACETAMINOPHEN 1 TABLET: 10; 325 TABLET ORAL at 16:54

## 2019-01-01 RX ADMIN — ARIPIPRAZOLE 10 MG: 10 TABLET ORAL at 09:02

## 2019-01-01 RX ADMIN — OXYBUTYNIN CHLORIDE 5 MG: 5 TABLET ORAL at 10:31

## 2019-01-01 RX ADMIN — FAMOTIDINE 20 MG: 20 TABLET ORAL at 09:03

## 2019-01-01 RX ADMIN — FERROUS SULFATE TAB 325 MG (65 MG ELEMENTAL FE) 325 MG: 325 (65 FE) TAB at 09:49

## 2019-01-01 RX ADMIN — INSULIN GLARGINE 22 UNITS: 100 INJECTION, SOLUTION SUBCUTANEOUS at 08:24

## 2019-01-01 RX ADMIN — OXYCODONE HYDROCHLORIDE AND ACETAMINOPHEN 1 TABLET: 10; 325 TABLET ORAL at 09:39

## 2019-01-01 RX ADMIN — LEVOTHYROXINE SODIUM 75 MCG: 25 TABLET ORAL at 05:32

## 2019-01-01 RX ADMIN — METHOCARBAMOL 500 MG: 500 TABLET, FILM COATED ORAL at 22:03

## 2019-01-01 RX ADMIN — FAMOTIDINE 20 MG: 20 TABLET ORAL at 08:41

## 2019-01-01 RX ADMIN — PREDNISONE 30 MG: 10 TABLET ORAL at 09:13

## 2019-01-01 RX ADMIN — MIDAZOLAM HYDROCHLORIDE 1 MG: 2 INJECTION, SOLUTION INTRAMUSCULAR; INTRAVENOUS at 10:55

## 2019-01-01 RX ADMIN — SODIUM CHLORIDE 10 ML: 9 INJECTION, SOLUTION INTRAMUSCULAR; INTRAVENOUS; SUBCUTANEOUS at 22:04

## 2019-01-01 RX ADMIN — ALBUTEROL SULFATE 2.5 MG: 2.5 SOLUTION RESPIRATORY (INHALATION) at 18:56

## 2019-01-01 RX ADMIN — Medication 250 MG: at 09:15

## 2019-01-01 RX ADMIN — OXYCODONE HYDROCHLORIDE AND ACETAMINOPHEN 1 TABLET: 10; 325 TABLET ORAL at 08:39

## 2019-01-01 RX ADMIN — OXYCODONE HYDROCHLORIDE AND ACETAMINOPHEN 1 TABLET: 10; 325 TABLET ORAL at 22:42

## 2019-01-01 RX ADMIN — OXYCODONE HYDROCHLORIDE AND ACETAMINOPHEN 1 TABLET: 10; 325 TABLET ORAL at 05:52

## 2019-01-01 RX ADMIN — METHADONE HYDROCHLORIDE 25 MG: 5 TABLET ORAL at 14:34

## 2019-01-01 RX ADMIN — METHOCARBAMOL 500 MG: 500 TABLET, FILM COATED ORAL at 14:23

## 2019-01-01 RX ADMIN — METHOCARBAMOL 500 MG: 500 TABLET, FILM COATED ORAL at 13:58

## 2019-01-01 RX ADMIN — INSULIN LISPRO 4 UNITS: 100 INJECTION, SOLUTION INTRAVENOUS; SUBCUTANEOUS at 08:32

## 2019-01-01 RX ADMIN — OXYCODONE HYDROCHLORIDE AND ACETAMINOPHEN 1 TABLET: 10; 325 TABLET ORAL at 09:07

## 2019-01-01 RX ADMIN — OXYCODONE HYDROCHLORIDE AND ACETAMINOPHEN 2 TABLET: 5; 325 TABLET ORAL at 15:26

## 2019-01-01 RX ADMIN — OXYCODONE HYDROCHLORIDE AND ACETAMINOPHEN 1 TABLET: 10; 325 TABLET ORAL at 06:06

## 2019-01-01 RX ADMIN — ROSUVASTATIN CALCIUM 5 MG: 5 TABLET, COATED ORAL at 22:04

## 2019-01-01 RX ADMIN — PREDNISONE 30 MG: 5 TABLET ORAL at 10:11

## 2019-01-01 RX ADMIN — SERTRALINE HYDROCHLORIDE 50 MG: 50 TABLET ORAL at 09:11

## 2019-01-01 RX ADMIN — SODIUM CHLORIDE 250 MG: 900 INJECTION, SOLUTION INTRAVENOUS at 18:02

## 2019-01-01 RX ADMIN — SODIUM CHLORIDE 10 ML: 9 INJECTION, SOLUTION INTRAMUSCULAR; INTRAVENOUS; SUBCUTANEOUS at 05:43

## 2019-01-01 RX ADMIN — SERTRALINE HYDROCHLORIDE 50 MG: 50 TABLET ORAL at 09:14

## 2019-01-01 RX ADMIN — OXYCODONE HYDROCHLORIDE AND ACETAMINOPHEN 2 TABLET: 5; 325 TABLET ORAL at 09:04

## 2019-01-01 RX ADMIN — GUAIFENESIN 600 MG: 600 TABLET, EXTENDED RELEASE ORAL at 22:18

## 2019-01-01 RX ADMIN — PHENYLEPHRINE HYDROCHLORIDE 100 MCG: 10 INJECTION INTRAVENOUS at 12:37

## 2019-01-01 RX ADMIN — INSULIN LISPRO 3 UNITS: 100 INJECTION, SOLUTION INTRAVENOUS; SUBCUTANEOUS at 05:45

## 2019-01-01 RX ADMIN — ROSUVASTATIN CALCIUM 5 MG: 10 TABLET, FILM COATED ORAL at 21:45

## 2019-01-01 RX ADMIN — METHADONE HYDROCHLORIDE 20 MG: 10 TABLET ORAL at 10:12

## 2019-01-01 RX ADMIN — LUBIPROSTONE 24 MCG: 24 CAPSULE, GELATIN COATED ORAL at 10:31

## 2019-01-01 RX ADMIN — SODIUM CHLORIDE 10 ML: 9 INJECTION, SOLUTION INTRAMUSCULAR; INTRAVENOUS; SUBCUTANEOUS at 05:45

## 2019-01-01 RX ADMIN — OXYCODONE HYDROCHLORIDE AND ACETAMINOPHEN 1 TABLET: 10; 325 TABLET ORAL at 15:22

## 2019-01-01 RX ADMIN — FAMOTIDINE 20 MG: 20 TABLET ORAL at 19:19

## 2019-01-01 RX ADMIN — GUAIFENESIN 600 MG: 600 TABLET, EXTENDED RELEASE ORAL at 08:23

## 2019-01-01 RX ADMIN — INSULIN LISPRO 4 UNITS: 100 INJECTION, SOLUTION INTRAVENOUS; SUBCUTANEOUS at 12:42

## 2019-01-01 RX ADMIN — INSULIN GLARGINE 25 UNITS: 100 INJECTION, SOLUTION SUBCUTANEOUS at 09:07

## 2019-01-01 RX ADMIN — PHENYLEPHRINE HYDROCHLORIDE 100 MCG: 10 INJECTION INTRAVENOUS at 11:13

## 2019-01-01 RX ADMIN — ALLOPURINOL 100 MG: 100 TABLET ORAL at 10:13

## 2019-01-01 RX ADMIN — OXYCODONE HYDROCHLORIDE AND ACETAMINOPHEN 1 TABLET: 10; 325 TABLET ORAL at 02:11

## 2019-01-01 RX ADMIN — SODIUM CHLORIDE 10 ML: 9 INJECTION, SOLUTION INTRAMUSCULAR; INTRAVENOUS; SUBCUTANEOUS at 14:00

## 2019-01-01 RX ADMIN — FAMOTIDINE 20 MG: 20 TABLET ORAL at 18:45

## 2019-01-01 RX ADMIN — OXYCODONE HYDROCHLORIDE AND ACETAMINOPHEN 1 TABLET: 10; 325 TABLET ORAL at 05:06

## 2019-01-01 RX ADMIN — WATER 1 G: 1 INJECTION INTRAMUSCULAR; INTRAVENOUS; SUBCUTANEOUS at 11:22

## 2019-01-01 RX ADMIN — OXYCODONE HYDROCHLORIDE AND ACETAMINOPHEN 1 TABLET: 10; 325 TABLET ORAL at 08:42

## 2019-01-01 RX ADMIN — GUAIFENESIN 600 MG: 600 TABLET, EXTENDED RELEASE ORAL at 21:34

## 2019-01-01 RX ADMIN — FAMOTIDINE 20 MG: 20 TABLET ORAL at 18:43

## 2019-01-01 RX ADMIN — GUAIFENESIN 600 MG: 600 TABLET, EXTENDED RELEASE ORAL at 08:02

## 2019-01-01 RX ADMIN — Medication 5 TABLET: at 08:40

## 2019-01-01 RX ADMIN — OXYBUTYNIN CHLORIDE 5 MG: 5 TABLET ORAL at 17:35

## 2019-01-01 RX ADMIN — LIDOCAINE HYDROCHLORIDE 60 MG: 20 INJECTION, SOLUTION EPIDURAL; INFILTRATION; INTRACAUDAL; PERINEURAL at 10:57

## 2019-01-01 RX ADMIN — OXYCODONE HYDROCHLORIDE AND ACETAMINOPHEN 2 TABLET: 5; 325 TABLET ORAL at 07:36

## 2019-01-01 RX ADMIN — INSULIN LISPRO 3 UNITS: 100 INJECTION, SOLUTION INTRAVENOUS; SUBCUTANEOUS at 17:36

## 2019-01-01 RX ADMIN — Medication 1 TABLET: at 17:17

## 2019-01-01 RX ADMIN — ALLOPURINOL 100 MG: 100 TABLET ORAL at 09:02

## 2019-01-01 RX ADMIN — Medication 5 TABLET: at 10:08

## 2019-01-01 RX ADMIN — DIVALPROEX SODIUM 500 MG: 250 TABLET, DELAYED RELEASE ORAL at 21:51

## 2019-01-01 RX ADMIN — ASPIRIN 81 MG 81 MG: 81 TABLET ORAL at 08:18

## 2019-01-01 RX ADMIN — Medication 1 TABLET: at 09:03

## 2019-01-01 RX ADMIN — FLUTICASONE FUROATE AND VILANTEROL TRIFENATATE 1 PUFF: 100; 25 POWDER RESPIRATORY (INHALATION) at 09:45

## 2019-01-01 RX ADMIN — FERROUS SULFATE TAB 325 MG (65 MG ELEMENTAL FE) 325 MG: 325 (65 FE) TAB at 09:09

## 2019-01-01 RX ADMIN — OXYBUTYNIN CHLORIDE 5 MG: 5 TABLET ORAL at 08:01

## 2019-01-01 RX ADMIN — METHADONE HYDROCHLORIDE 20 MG: 10 TABLET ORAL at 09:14

## 2019-01-01 RX ADMIN — POTASSIUM CHLORIDE 40 MEQ: 1500 TABLET, EXTENDED RELEASE ORAL at 14:35

## 2019-01-01 RX ADMIN — METHOCARBAMOL 500 MG: 500 TABLET, FILM COATED ORAL at 21:39

## 2019-01-01 RX ADMIN — ARFORMOTEROL TARTRATE 15 MCG: 15 SOLUTION RESPIRATORY (INHALATION) at 19:57

## 2019-01-01 RX ADMIN — OXYBUTYNIN CHLORIDE 5 MG: 5 TABLET ORAL at 09:13

## 2019-01-01 RX ADMIN — POTASSIUM CHLORIDE: 2 INJECTION, SOLUTION, CONCENTRATE INTRAVENOUS at 20:48

## 2019-01-01 RX ADMIN — FLUTICASONE FUROATE AND VILANTEROL TRIFENATATE 1 PUFF: 100; 25 POWDER RESPIRATORY (INHALATION) at 09:30

## 2019-01-01 RX ADMIN — ALLOPURINOL 100 MG: 100 TABLET ORAL at 09:15

## 2019-01-01 RX ADMIN — SERTRALINE HYDROCHLORIDE 50 MG: 50 TABLET ORAL at 10:31

## 2019-01-01 RX ADMIN — OXYBUTYNIN CHLORIDE 5 MG: 5 TABLET ORAL at 17:16

## 2019-01-01 RX ADMIN — SODIUM CHLORIDE 10 ML: 9 INJECTION, SOLUTION INTRAMUSCULAR; INTRAVENOUS; SUBCUTANEOUS at 22:14

## 2019-01-01 RX ADMIN — OXYCODONE HYDROCHLORIDE AND ACETAMINOPHEN 1 TABLET: 10; 325 TABLET ORAL at 06:15

## 2019-01-01 RX ADMIN — METHOCARBAMOL 500 MG: 500 TABLET, FILM COATED ORAL at 07:11

## 2019-01-01 RX ADMIN — FERROUS SULFATE TAB 325 MG (65 MG ELEMENTAL FE) 325 MG: 325 (65 FE) TAB at 09:15

## 2019-01-01 RX ADMIN — FAMOTIDINE 20 MG: 20 TABLET ORAL at 17:17

## 2019-01-01 RX ADMIN — OXYCODONE HYDROCHLORIDE AND ACETAMINOPHEN 2 TABLET: 5; 325 TABLET ORAL at 04:07

## 2019-01-01 RX ADMIN — Medication 250 MG: at 10:12

## 2019-01-01 RX ADMIN — DOCUSATE SODIUM 100 MG: 100 CAPSULE, LIQUID FILLED ORAL at 09:49

## 2019-01-01 RX ADMIN — POTASSIUM CHLORIDE: 2 INJECTION, SOLUTION, CONCENTRATE INTRAVENOUS at 23:08

## 2019-01-01 RX ADMIN — CALCIUM CITRATE 200 MG (950 MG) TABLET 200 MG: at 10:11

## 2019-01-01 RX ADMIN — SENNOSIDES AND DOCUSATE SODIUM 2 TABLET: 8.6; 5 TABLET ORAL at 18:47

## 2019-01-01 RX ADMIN — Medication 250 MG: at 09:51

## 2019-01-01 RX ADMIN — METHADONE HYDROCHLORIDE 20 MG: 10 TABLET ORAL at 08:18

## 2019-01-01 RX ADMIN — GUAIFENESIN 600 MG: 600 TABLET, EXTENDED RELEASE ORAL at 08:17

## 2019-01-01 RX ADMIN — Medication 5 TABLET: at 10:31

## 2019-01-01 RX ADMIN — INSULIN LISPRO 3 UNITS: 100 INJECTION, SOLUTION INTRAVENOUS; SUBCUTANEOUS at 11:53

## 2019-01-01 RX ADMIN — ASPIRIN 81 MG 81 MG: 81 TABLET ORAL at 08:40

## 2019-01-01 RX ADMIN — OXYCODONE HYDROCHLORIDE AND ACETAMINOPHEN 1 TABLET: 10; 325 TABLET ORAL at 21:51

## 2019-01-01 RX ADMIN — FAMOTIDINE 20 MG: 20 TABLET ORAL at 18:00

## 2019-01-01 RX ADMIN — INSULIN GLARGINE 25 UNITS: 100 INJECTION, SOLUTION SUBCUTANEOUS at 08:15

## 2019-01-01 RX ADMIN — OXYCODONE HYDROCHLORIDE AND ACETAMINOPHEN 2 TABLET: 5; 325 TABLET ORAL at 15:22

## 2019-01-01 RX ADMIN — ARIPIPRAZOLE 10 MG: 10 TABLET ORAL at 09:03

## 2019-01-01 RX ADMIN — OXYCODONE HYDROCHLORIDE AND ACETAMINOPHEN 1 TABLET: 10; 325 TABLET ORAL at 06:27

## 2019-01-01 RX ADMIN — INSULIN LISPRO 4 UNITS: 100 INJECTION, SOLUTION INTRAVENOUS; SUBCUTANEOUS at 12:33

## 2019-01-01 RX ADMIN — SODIUM CHLORIDE 10 ML: 9 INJECTION, SOLUTION INTRAMUSCULAR; INTRAVENOUS; SUBCUTANEOUS at 06:00

## 2019-01-01 RX ADMIN — OXYCODONE HYDROCHLORIDE AND ACETAMINOPHEN 1 TABLET: 10; 325 TABLET ORAL at 12:43

## 2019-01-01 RX ADMIN — Medication 1 TABLET: at 17:16

## 2019-01-01 RX ADMIN — HYDROMORPHONE HYDROCHLORIDE 0.5 MG: 1 INJECTION, SOLUTION INTRAMUSCULAR; INTRAVENOUS; SUBCUTANEOUS at 17:03

## 2019-01-01 RX ADMIN — DOCUSATE SODIUM 100 MG: 100 CAPSULE, LIQUID FILLED ORAL at 10:12

## 2019-01-01 RX ADMIN — Medication 1 G: at 08:22

## 2019-01-01 RX ADMIN — OXYBUTYNIN CHLORIDE 5 MG: 5 TABLET ORAL at 10:11

## 2019-01-01 RX ADMIN — PHENYLEPHRINE HYDROCHLORIDE 200 MCG: 10 INJECTION INTRAVENOUS at 11:55

## 2019-01-01 RX ADMIN — SENNOSIDES AND DOCUSATE SODIUM 2 TABLET: 8.6; 5 TABLET ORAL at 17:33

## 2019-01-01 RX ADMIN — GUAIFENESIN 600 MG: 600 TABLET, EXTENDED RELEASE ORAL at 21:18

## 2019-01-01 RX ADMIN — OXYCODONE HYDROCHLORIDE AND ACETAMINOPHEN 1 TABLET: 10; 325 TABLET ORAL at 23:00

## 2019-01-01 RX ADMIN — INSULIN LISPRO 3 UNITS: 100 INJECTION, SOLUTION INTRAVENOUS; SUBCUTANEOUS at 08:25

## 2019-01-01 RX ADMIN — FERROUS SULFATE TAB 325 MG (65 MG ELEMENTAL FE) 325 MG: 325 (65 FE) TAB at 09:04

## 2019-01-01 RX ADMIN — CALCIUM CITRATE 200 MG (950 MG) TABLET 200 MG: at 09:15

## 2019-01-01 RX ADMIN — CALCIUM CITRATE 200 MG (950 MG) TABLET 200 MG: at 09:03

## 2019-01-01 RX ADMIN — ARFORMOTEROL TARTRATE 15 MCG: 15 SOLUTION RESPIRATORY (INHALATION) at 09:14

## 2019-01-01 RX ADMIN — CALCIUM CITRATE 200 MG (950 MG) TABLET 200 MG: at 10:12

## 2019-01-01 RX ADMIN — FLUTICASONE FUROATE AND VILANTEROL TRIFENATATE 1 PUFF: 100; 25 POWDER RESPIRATORY (INHALATION) at 08:59

## 2019-01-01 RX ADMIN — INSULIN LISPRO 6 UNITS: 100 INJECTION, SOLUTION INTRAVENOUS; SUBCUTANEOUS at 17:50

## 2019-01-01 RX ADMIN — CALCIUM CITRATE 200 MG (950 MG) TABLET 200 MG: at 17:16

## 2019-01-01 RX ADMIN — OXYBUTYNIN CHLORIDE 5 MG: 5 TABLET ORAL at 18:04

## 2019-01-01 RX ADMIN — ROSUVASTATIN CALCIUM 5 MG: 5 TABLET, COATED ORAL at 22:55

## 2019-01-01 RX ADMIN — LEVOTHYROXINE SODIUM 75 MCG: 25 TABLET ORAL at 06:04

## 2019-01-01 RX ADMIN — DEXAMETHASONE SODIUM PHOSPHATE 4 MG: 4 INJECTION, SOLUTION INTRAMUSCULAR; INTRAVENOUS at 18:41

## 2019-01-01 RX ADMIN — DEXMEDETOMIDINE 6 MCG: 100 INJECTION, SOLUTION, CONCENTRATE INTRAVENOUS at 11:33

## 2019-01-01 RX ADMIN — CALCIUM CITRATE 200 MG (950 MG) TABLET 200 MG: at 18:47

## 2019-01-01 RX ADMIN — OXYCODONE HYDROCHLORIDE AND ACETAMINOPHEN 1 TABLET: 10; 325 TABLET ORAL at 10:54

## 2019-01-01 RX ADMIN — OXYCODONE HYDROCHLORIDE AND ACETAMINOPHEN 1 TABLET: 10; 325 TABLET ORAL at 17:33

## 2019-01-01 RX ADMIN — OXYBUTYNIN CHLORIDE 5 MG: 5 TABLET ORAL at 18:45

## 2019-01-01 RX ADMIN — FAMOTIDINE 20 MG: 20 TABLET ORAL at 17:27

## 2019-01-01 RX ADMIN — Medication 5 TABLET: at 08:23

## 2019-01-01 RX ADMIN — ROSUVASTATIN CALCIUM 5 MG: 5 TABLET, COATED ORAL at 22:13

## 2019-01-01 RX ADMIN — SODIUM CHLORIDE 250 MG: 900 INJECTION, SOLUTION INTRAVENOUS at 11:16

## 2019-01-01 RX ADMIN — OXYCODONE HYDROCHLORIDE AND ACETAMINOPHEN 1 TABLET: 10; 325 TABLET ORAL at 09:49

## 2019-01-01 RX ADMIN — OXYBUTYNIN CHLORIDE 5 MG: 5 TABLET ORAL at 17:33

## 2019-01-01 RX ADMIN — FLUTICASONE FUROATE AND VILANTEROL TRIFENATATE 1 PUFF: 100; 25 POWDER RESPIRATORY (INHALATION) at 08:41

## 2019-01-01 RX ADMIN — METHOCARBAMOL 500 MG: 500 TABLET, FILM COATED ORAL at 05:37

## 2019-01-01 RX ADMIN — FAMOTIDINE 20 MG: 20 TABLET ORAL at 08:40

## 2019-01-01 RX ADMIN — SODIUM CHLORIDE 10 ML: 9 INJECTION, SOLUTION INTRAMUSCULAR; INTRAVENOUS; SUBCUTANEOUS at 21:14

## 2019-01-01 RX ADMIN — IPRATROPIUM BROMIDE AND ALBUTEROL SULFATE 3 ML: .5; 3 SOLUTION RESPIRATORY (INHALATION) at 08:16

## 2019-01-01 RX ADMIN — SENNOSIDES AND DOCUSATE SODIUM 2 TABLET: 8.6; 5 TABLET ORAL at 17:15

## 2019-01-01 RX ADMIN — ROSUVASTATIN CALCIUM 5 MG: 10 TABLET, FILM COATED ORAL at 22:42

## 2019-01-01 RX ADMIN — DOCUSATE SODIUM 100 MG: 100 CAPSULE, LIQUID FILLED ORAL at 09:04

## 2019-01-01 RX ADMIN — METHOCARBAMOL 500 MG: 500 TABLET, FILM COATED ORAL at 06:06

## 2019-01-01 RX ADMIN — ROSUVASTATIN CALCIUM 5 MG: 10 TABLET, FILM COATED ORAL at 21:39

## 2019-01-01 RX ADMIN — OXYCODONE HYDROCHLORIDE AND ACETAMINOPHEN 1 TABLET: 10; 325 TABLET ORAL at 05:32

## 2019-01-01 RX ADMIN — BUDESONIDE 1000 MCG: 1 SUSPENSION RESPIRATORY (INHALATION) at 20:50

## 2019-01-01 RX ADMIN — OXYCODONE HYDROCHLORIDE AND ACETAMINOPHEN 1 TABLET: 10; 325 TABLET ORAL at 02:56

## 2019-01-01 RX ADMIN — ARIPIPRAZOLE 10 MG: 10 TABLET ORAL at 08:37

## 2019-01-01 RX ADMIN — PROPOFOL 100 MG: 10 INJECTION, EMULSION INTRAVENOUS at 10:57

## 2019-01-01 RX ADMIN — INSULIN LISPRO 3 UNITS: 100 INJECTION, SOLUTION INTRAVENOUS; SUBCUTANEOUS at 12:41

## 2019-01-01 RX ADMIN — METHADONE HYDROCHLORIDE 20 MG: 10 TABLET ORAL at 09:09

## 2019-01-01 RX ADMIN — SENNOSIDES AND DOCUSATE SODIUM 2 TABLET: 8.6; 5 TABLET ORAL at 19:19

## 2019-01-01 RX ADMIN — INSULIN LISPRO 6 UNITS: 100 INJECTION, SOLUTION INTRAVENOUS; SUBCUTANEOUS at 16:21

## 2019-01-01 RX ADMIN — INSULIN LISPRO 2 UNITS: 100 INJECTION, SOLUTION INTRAVENOUS; SUBCUTANEOUS at 12:39

## 2019-01-01 RX ADMIN — Medication 5 TABLET: at 08:41

## 2019-01-01 RX ADMIN — Medication 250 MG: at 08:01

## 2019-01-01 RX ADMIN — PREDNISONE 30 MG: 5 TABLET ORAL at 08:24

## 2019-01-01 RX ADMIN — CALCIUM CITRATE 200 MG (950 MG) TABLET 200 MG: at 17:35

## 2019-01-01 RX ADMIN — INSULIN GLARGINE 25 UNITS: 100 INJECTION, SOLUTION SUBCUTANEOUS at 08:42

## 2019-01-01 RX ADMIN — PREDNISONE 30 MG: 5 TABLET ORAL at 08:43

## 2019-01-01 RX ADMIN — FLUTICASONE FUROATE AND VILANTEROL TRIFENATATE 1 PUFF: 100; 25 POWDER RESPIRATORY (INHALATION) at 09:24

## 2019-01-01 RX ADMIN — INSULIN LISPRO 4 UNITS: 100 INJECTION, SOLUTION INTRAVENOUS; SUBCUTANEOUS at 08:29

## 2019-01-01 RX ADMIN — CALCIUM CITRATE 200 MG (950 MG) TABLET 200 MG: at 08:01

## 2019-01-01 RX ADMIN — HYDROMORPHONE HYDROCHLORIDE 1 MG: 1 INJECTION, SOLUTION INTRAMUSCULAR; INTRAVENOUS; SUBCUTANEOUS at 22:59

## 2019-01-01 RX ADMIN — FAMOTIDINE 20 MG: 20 TABLET ORAL at 17:14

## 2019-01-01 RX ADMIN — Medication 5 TABLET: at 09:14

## 2019-01-01 RX ADMIN — INSULIN LISPRO 3 UNITS: 100 INJECTION, SOLUTION INTRAVENOUS; SUBCUTANEOUS at 12:00

## 2019-01-01 RX ADMIN — GUAIFENESIN 600 MG: 600 TABLET, EXTENDED RELEASE ORAL at 10:31

## 2019-01-01 RX ADMIN — SENNOSIDES AND DOCUSATE SODIUM 2 TABLET: 8.6; 5 TABLET ORAL at 17:35

## 2019-01-01 RX ADMIN — GUAIFENESIN 600 MG: 600 TABLET, EXTENDED RELEASE ORAL at 09:12

## 2019-01-01 RX ADMIN — INSULIN GLARGINE 20 UNITS: 100 INJECTION, SOLUTION SUBCUTANEOUS at 09:03

## 2019-01-01 RX ADMIN — OXYCODONE HYDROCHLORIDE AND ACETAMINOPHEN 1 TABLET: 10; 325 TABLET ORAL at 22:25

## 2019-01-01 RX ADMIN — BISACODYL 10 MG: 5 TABLET, COATED ORAL at 08:37

## 2019-01-01 RX ADMIN — OXYCODONE HYDROCHLORIDE AND ACETAMINOPHEN 1 TABLET: 10; 325 TABLET ORAL at 02:57

## 2019-01-01 RX ADMIN — FAMOTIDINE 20 MG: 10 INJECTION, SOLUTION INTRAVENOUS at 21:42

## 2019-01-01 RX ADMIN — INSULIN LISPRO 4 UNITS: 100 INJECTION, SOLUTION INTRAVENOUS; SUBCUTANEOUS at 18:05

## 2019-01-01 RX ADMIN — Medication 1 TABLET: at 17:29

## 2019-01-01 RX ADMIN — DIVALPROEX SODIUM 500 MG: 250 TABLET, DELAYED RELEASE ORAL at 21:39

## 2019-01-01 RX ADMIN — ARIPIPRAZOLE 10 MG: 10 TABLET ORAL at 08:21

## 2019-01-01 RX ADMIN — METHOCARBAMOL 500 MG: 500 TABLET, FILM COATED ORAL at 15:07

## 2019-01-01 RX ADMIN — CALCIUM CITRATE 200 MG (950 MG) TABLET 200 MG: at 08:40

## 2019-01-01 RX ADMIN — Medication 10 MG: at 11:20

## 2019-01-01 RX ADMIN — INSULIN LISPRO 4 UNITS: 100 INJECTION, SOLUTION INTRAVENOUS; SUBCUTANEOUS at 17:00

## 2019-01-01 RX ADMIN — METHOCARBAMOL 500 MG: 500 TABLET, FILM COATED ORAL at 14:26

## 2019-01-01 RX ADMIN — FERROUS SULFATE TAB 325 MG (65 MG ELEMENTAL FE) 325 MG: 325 (65 FE) TAB at 08:44

## 2019-01-01 RX ADMIN — METHADONE HYDROCHLORIDE 20 MG: 10 TABLET ORAL at 09:04

## 2019-01-01 RX ADMIN — GUAIFENESIN 600 MG: 600 TABLET, EXTENDED RELEASE ORAL at 22:03

## 2019-01-01 RX ADMIN — DOCUSATE SODIUM 100 MG: 100 CAPSULE, LIQUID FILLED ORAL at 09:15

## 2019-01-01 RX ADMIN — FERROUS SULFATE TAB 325 MG (65 MG ELEMENTAL FE) 325 MG: 325 (65 FE) TAB at 09:02

## 2019-01-01 RX ADMIN — OXYBUTYNIN CHLORIDE 5 MG: 5 TABLET ORAL at 17:26

## 2019-01-01 RX ADMIN — METHOCARBAMOL 500 MG: 500 TABLET, FILM COATED ORAL at 21:53

## 2019-01-01 RX ADMIN — METHOCARBAMOL 500 MG: 500 TABLET, FILM COATED ORAL at 13:02

## 2019-01-01 RX ADMIN — INSULIN LISPRO 6 UNITS: 100 INJECTION, SOLUTION INTRAVENOUS; SUBCUTANEOUS at 12:34

## 2019-01-01 RX ADMIN — INSULIN LISPRO 4 UNITS: 100 INJECTION, SOLUTION INTRAVENOUS; SUBCUTANEOUS at 12:45

## 2019-01-01 RX ADMIN — OXYBUTYNIN CHLORIDE 5 MG: 5 TABLET ORAL at 17:31

## 2019-01-01 RX ADMIN — LUBIPROSTONE 24 MCG: 24 CAPSULE, GELATIN COATED ORAL at 10:11

## 2019-01-01 RX ADMIN — ROSUVASTATIN CALCIUM 5 MG: 5 TABLET, COATED ORAL at 21:42

## 2019-01-01 RX ADMIN — CALCIUM CITRATE 200 MG (950 MG) TABLET 200 MG: at 18:45

## 2019-01-01 RX ADMIN — INSULIN LISPRO 4 UNITS: 100 INJECTION, SOLUTION INTRAVENOUS; SUBCUTANEOUS at 08:31

## 2019-01-01 RX ADMIN — METHOCARBAMOL 500 MG: 500 TABLET, FILM COATED ORAL at 13:19

## 2019-01-01 RX ADMIN — DIVALPROEX SODIUM 500 MG: 250 TABLET, DELAYED RELEASE ORAL at 21:17

## 2019-01-01 RX ADMIN — OXYCODONE HYDROCHLORIDE AND ACETAMINOPHEN 1 TABLET: 10; 325 TABLET ORAL at 12:53

## 2019-01-01 RX ADMIN — Medication 1 TABLET: at 17:26

## 2019-01-01 RX ADMIN — Medication 5 TABLET: at 09:02

## 2019-01-01 RX ADMIN — ASPIRIN 81 MG 81 MG: 81 TABLET ORAL at 08:25

## 2019-01-01 RX ADMIN — METHADONE HYDROCHLORIDE 20 MG: 10 TABLET ORAL at 08:41

## 2019-01-01 RX ADMIN — FAMOTIDINE 20 MG: 20 TABLET ORAL at 09:04

## 2019-01-01 RX ADMIN — GUAIFENESIN 600 MG: 600 TABLET, EXTENDED RELEASE ORAL at 22:13

## 2019-01-01 RX ADMIN — ACETAMINOPHEN 1000 MG: 500 TABLET ORAL at 06:07

## 2019-01-01 RX ADMIN — SERTRALINE HYDROCHLORIDE 50 MG: 50 TABLET ORAL at 08:05

## 2019-01-01 RX ADMIN — CALCIUM CITRATE 200 MG (950 MG) TABLET 200 MG: at 17:33

## 2019-01-01 RX ADMIN — Medication 250 MG: at 08:26

## 2019-01-01 RX ADMIN — POTASSIUM CHLORIDE 10 MEQ: 7.46 INJECTION, SOLUTION INTRAVENOUS at 06:43

## 2019-01-01 RX ADMIN — GUAIFENESIN 600 MG: 600 TABLET, EXTENDED RELEASE ORAL at 21:42

## 2019-01-01 RX ADMIN — FAMOTIDINE 20 MG: 20 TABLET ORAL at 08:17

## 2019-01-01 RX ADMIN — METHADONE HYDROCHLORIDE 20 MG: 10 TABLET ORAL at 09:49

## 2019-01-01 RX ADMIN — Medication 250 MG: at 10:08

## 2019-01-01 RX ADMIN — DIVALPROEX SODIUM 500 MG: 250 TABLET, DELAYED RELEASE ORAL at 22:03

## 2019-01-01 RX ADMIN — ALLOPURINOL 100 MG: 100 TABLET ORAL at 09:03

## 2019-01-01 RX ADMIN — CALCIUM CITRATE 200 MG (950 MG) TABLET 200 MG: at 08:18

## 2019-01-01 RX ADMIN — OXYCODONE HYDROCHLORIDE AND ACETAMINOPHEN 1 TABLET: 10; 325 TABLET ORAL at 21:39

## 2019-01-01 RX ADMIN — PROPOFOL 100 MCG/KG/MIN: 10 INJECTION, EMULSION INTRAVENOUS at 04:57

## 2019-01-01 RX ADMIN — ALBUTEROL SULFATE 2.5 MG: 2.5 SOLUTION RESPIRATORY (INHALATION) at 03:16

## 2019-01-01 RX ADMIN — SENNOSIDES AND DOCUSATE SODIUM 2 TABLET: 8.6; 5 TABLET ORAL at 18:45

## 2019-01-01 RX ADMIN — OXYBUTYNIN CHLORIDE 5 MG: 5 TABLET ORAL at 17:37

## 2019-01-01 RX ADMIN — FAMOTIDINE 20 MG: 20 TABLET ORAL at 17:15

## 2019-01-01 RX ADMIN — METHOCARBAMOL 500 MG: 500 TABLET, FILM COATED ORAL at 21:49

## 2019-01-01 RX ADMIN — Medication 1 TABLET: at 09:01

## 2019-01-01 RX ADMIN — INSULIN GLARGINE 20 UNITS: 100 INJECTION, SOLUTION SUBCUTANEOUS at 09:05

## 2019-01-01 RX ADMIN — OXYCODONE HYDROCHLORIDE AND ACETAMINOPHEN 1 TABLET: 10; 325 TABLET ORAL at 12:37

## 2019-01-01 RX ADMIN — GUAIFENESIN 600 MG: 600 TABLET, EXTENDED RELEASE ORAL at 08:41

## 2019-01-01 RX ADMIN — INSULIN GLARGINE 20 UNITS: 100 INJECTION, SOLUTION SUBCUTANEOUS at 08:38

## 2019-01-01 RX ADMIN — METHOCARBAMOL 500 MG: 500 TABLET, FILM COATED ORAL at 05:06

## 2019-01-01 RX ADMIN — INSULIN LISPRO 2 UNITS: 100 INJECTION, SOLUTION INTRAVENOUS; SUBCUTANEOUS at 08:20

## 2019-01-01 RX ADMIN — BUDESONIDE 1000 MCG: 1 SUSPENSION RESPIRATORY (INHALATION) at 20:55

## 2019-01-01 RX ADMIN — PREDNISONE 30 MG: 5 TABLET ORAL at 09:09

## 2019-01-01 RX ADMIN — METHADONE HYDROCHLORIDE 25 MG: 5 TABLET ORAL at 10:00

## 2019-01-01 RX ADMIN — METHADONE HYDROCHLORIDE 25 MG: 5 TABLET ORAL at 09:04

## 2019-01-01 RX ADMIN — CALCIUM CITRATE 200 MG (950 MG) TABLET 200 MG: at 18:04

## 2019-01-01 RX ADMIN — PREDNISONE 30 MG: 5 TABLET ORAL at 08:05

## 2019-01-01 RX ADMIN — INSULIN LISPRO 3 UNITS: 100 INJECTION, SOLUTION INTRAVENOUS; SUBCUTANEOUS at 16:04

## 2019-01-01 RX ADMIN — FERROUS SULFATE TAB 325 MG (65 MG ELEMENTAL FE) 325 MG: 325 (65 FE) TAB at 10:11

## 2019-01-01 RX ADMIN — FAMOTIDINE 20 MG: 10 INJECTION, SOLUTION INTRAVENOUS at 09:14

## 2019-01-01 RX ADMIN — POTASSIUM CHLORIDE 40 MEQ: 1500 TABLET, EXTENDED RELEASE ORAL at 12:02

## 2019-01-01 RX ADMIN — GADOTERATE MEGLUMINE 15 ML: 376.9 INJECTION INTRAVENOUS at 05:58

## 2019-01-01 RX ADMIN — SENNOSIDES AND DOCUSATE SODIUM 2 TABLET: 8.6; 5 TABLET ORAL at 18:06

## 2019-01-01 RX ADMIN — OXYBUTYNIN CHLORIDE 5 MG: 5 TABLET ORAL at 17:14

## 2019-01-01 RX ADMIN — DIVALPROEX SODIUM 250 MG: 250 TABLET, DELAYED RELEASE ORAL at 22:04

## 2019-01-01 RX ADMIN — INSULIN LISPRO 6 UNITS: 100 INJECTION, SOLUTION INTRAVENOUS; SUBCUTANEOUS at 17:37

## 2019-01-01 RX ADMIN — INSULIN LISPRO 3 UNITS: 100 INJECTION, SOLUTION INTRAVENOUS; SUBCUTANEOUS at 12:42

## 2019-01-01 RX ADMIN — Medication 3 MG: at 12:56

## 2019-01-01 RX ADMIN — GUAIFENESIN 600 MG: 600 TABLET, EXTENDED RELEASE ORAL at 09:51

## 2019-01-01 RX ADMIN — METHADONE HYDROCHLORIDE 20 MG: 10 TABLET ORAL at 08:23

## 2019-01-01 RX ADMIN — INSULIN LISPRO 4 UNITS: 100 INJECTION, SOLUTION INTRAVENOUS; SUBCUTANEOUS at 12:44

## 2019-01-01 RX ADMIN — SENNOSIDES AND DOCUSATE SODIUM 2 TABLET: 8.6; 5 TABLET ORAL at 17:36

## 2019-01-01 RX ADMIN — FAMOTIDINE 20 MG: 20 TABLET ORAL at 17:16

## 2019-01-01 RX ADMIN — IPRATROPIUM BROMIDE AND ALBUTEROL SULFATE 3 ML: .5; 3 SOLUTION RESPIRATORY (INHALATION) at 18:41

## 2019-01-01 RX ADMIN — OXYCODONE HYDROCHLORIDE AND ACETAMINOPHEN 1 TABLET: 5; 325 TABLET ORAL at 12:37

## 2019-01-01 RX ADMIN — BUDESONIDE 1000 MCG: 1 SUSPENSION RESPIRATORY (INHALATION) at 09:14

## 2019-01-01 RX ADMIN — INSULIN GLARGINE 10 UNITS: 100 INJECTION, SOLUTION SUBCUTANEOUS at 08:21

## 2019-01-01 RX ADMIN — ROSUVASTATIN CALCIUM 5 MG: 5 TABLET, COATED ORAL at 21:14

## 2019-01-01 RX ADMIN — MORPHINE SULFATE: 10 INJECTION, SOLUTION INTRAMUSCULAR; INTRAVENOUS at 15:03

## 2019-01-01 RX ADMIN — METHOCARBAMOL 500 MG: 500 TABLET, FILM COATED ORAL at 05:52

## 2019-01-01 RX ADMIN — ALLOPURINOL 100 MG: 100 TABLET ORAL at 08:41

## 2019-01-01 RX ADMIN — FLUTICASONE FUROATE AND VILANTEROL TRIFENATATE 1 PUFF: 100; 25 POWDER RESPIRATORY (INHALATION) at 09:52

## 2019-01-01 RX ADMIN — DOCUSATE SODIUM 100 MG: 100 CAPSULE, LIQUID FILLED ORAL at 09:16

## 2019-01-01 RX ADMIN — OXYCODONE HYDROCHLORIDE AND ACETAMINOPHEN 1 TABLET: 10; 325 TABLET ORAL at 17:27

## 2019-01-01 RX ADMIN — OXYCODONE HYDROCHLORIDE AND ACETAMINOPHEN 1 TABLET: 10; 325 TABLET ORAL at 18:57

## 2019-01-01 RX ADMIN — IPRATROPIUM BROMIDE AND ALBUTEROL SULFATE 3 ML: .5; 3 SOLUTION RESPIRATORY (INHALATION) at 03:15

## 2019-01-01 RX ADMIN — SODIUM CHLORIDE 300 MG: 900 INJECTION, SOLUTION INTRAVENOUS at 15:24

## 2019-01-01 RX ADMIN — HYDROMORPHONE HYDROCHLORIDE 1 MG: 1 INJECTION, SOLUTION INTRAMUSCULAR; INTRAVENOUS; SUBCUTANEOUS at 08:53

## 2019-01-01 RX ADMIN — ASPIRIN 81 MG 81 MG: 81 TABLET ORAL at 09:50

## 2019-01-01 RX ADMIN — INSULIN GLARGINE 25 UNITS: 100 INJECTION, SOLUTION SUBCUTANEOUS at 08:30

## 2019-01-01 RX ADMIN — PHENYLEPHRINE HYDROCHLORIDE 100 MCG: 10 INJECTION INTRAVENOUS at 11:11

## 2019-01-01 RX ADMIN — Medication 250 MG: at 08:22

## 2019-01-01 RX ADMIN — SENNOSIDES, DOCUSATE SODIUM 1 TABLET: 50; 8.6 TABLET, FILM COATED ORAL at 12:02

## 2019-01-01 RX ADMIN — HYDROCORTISONE SODIUM SUCCINATE 100 MG: 100 INJECTION, POWDER, FOR SOLUTION INTRAMUSCULAR; INTRAVENOUS at 11:15

## 2019-01-01 RX ADMIN — DOCUSATE SODIUM 100 MG: 100 CAPSULE, LIQUID FILLED ORAL at 09:09

## 2019-01-01 RX ADMIN — LUBIPROSTONE 24 MCG: 24 CAPSULE, GELATIN COATED ORAL at 10:12

## 2019-01-01 RX ADMIN — FAMOTIDINE 20 MG: 20 TABLET ORAL at 18:06

## 2019-01-01 RX ADMIN — OXYBUTYNIN CHLORIDE 5 MG: 5 TABLET ORAL at 10:13

## 2019-01-01 RX ADMIN — OXYBUTYNIN CHLORIDE 5 MG: 5 TABLET ORAL at 08:41

## 2019-01-01 RX ADMIN — LORAZEPAM 1 MG: 2 INJECTION INTRAMUSCULAR; INTRAVENOUS at 23:02

## 2019-01-01 RX ADMIN — INSULIN LISPRO 3 UNITS: 100 INJECTION, SOLUTION INTRAVENOUS; SUBCUTANEOUS at 17:29

## 2019-01-01 RX ADMIN — ALLOPURINOL 100 MG: 100 TABLET ORAL at 10:11

## 2019-01-01 RX ADMIN — DOCUSATE SODIUM 100 MG: 100 CAPSULE, LIQUID FILLED ORAL at 08:23

## 2019-01-01 RX ADMIN — INSULIN LISPRO 8 UNITS: 100 INJECTION, SOLUTION INTRAVENOUS; SUBCUTANEOUS at 17:14

## 2019-01-01 RX ADMIN — OXYCODONE HYDROCHLORIDE AND ACETAMINOPHEN 1 TABLET: 10; 325 TABLET ORAL at 13:15

## 2019-01-01 RX ADMIN — SERTRALINE HYDROCHLORIDE 50 MG: 50 TABLET ORAL at 09:13

## 2019-01-01 RX ADMIN — PREDNISONE 30 MG: 5 TABLET ORAL at 08:02

## 2019-01-01 RX ADMIN — FAMOTIDINE 20 MG: 20 TABLET ORAL at 09:16

## 2019-01-01 RX ADMIN — ASPIRIN 81 MG 81 MG: 81 TABLET ORAL at 08:01

## 2019-01-01 RX ADMIN — FERROUS SULFATE TAB 325 MG (65 MG ELEMENTAL FE) 325 MG: 325 (65 FE) TAB at 09:13

## 2019-01-01 RX ADMIN — Medication 10 MG: at 12:05

## 2019-01-01 RX ADMIN — INSULIN LISPRO 12 UNITS: 100 INJECTION, SOLUTION INTRAVENOUS; SUBCUTANEOUS at 23:07

## 2019-01-01 RX ADMIN — FAMOTIDINE 20 MG: 20 TABLET ORAL at 18:47

## 2019-01-01 RX ADMIN — INSULIN LISPRO 3 UNITS: 100 INJECTION, SOLUTION INTRAVENOUS; SUBCUTANEOUS at 17:50

## 2019-01-01 RX ADMIN — ALLOPURINOL 100 MG: 100 TABLET ORAL at 08:42

## 2019-01-01 RX ADMIN — GUAIFENESIN 600 MG: 600 TABLET, EXTENDED RELEASE ORAL at 21:33

## 2019-01-01 RX ADMIN — HYDROMORPHONE HYDROCHLORIDE 1 MG: 1 INJECTION, SOLUTION INTRAMUSCULAR; INTRAVENOUS; SUBCUTANEOUS at 14:45

## 2019-01-01 RX ADMIN — Medication 250 MG: at 08:44

## 2019-01-01 RX ADMIN — LEVOTHYROXINE SODIUM 75 MCG: 25 TABLET ORAL at 05:52

## 2019-01-01 RX ADMIN — DIVALPROEX SODIUM 250 MG: 250 TABLET, DELAYED RELEASE ORAL at 21:36

## 2019-01-01 RX ADMIN — FLUTICASONE FUROATE AND VILANTEROL TRIFENATATE 1 PUFF: 100; 25 POWDER RESPIRATORY (INHALATION) at 09:35

## 2019-01-01 RX ADMIN — METHOCARBAMOL 500 MG: 500 TABLET, FILM COATED ORAL at 13:01

## 2019-01-01 RX ADMIN — GUAIFENESIN 600 MG: 600 TABLET, EXTENDED RELEASE ORAL at 08:22

## 2019-01-01 RX ADMIN — CALCIUM CITRATE 200 MG (950 MG) TABLET 200 MG: at 17:38

## 2019-01-01 RX ADMIN — OXYCODONE HYDROCHLORIDE AND ACETAMINOPHEN 2 TABLET: 5; 325 TABLET ORAL at 20:16

## 2019-01-01 RX ADMIN — OXYCODONE HYDROCHLORIDE AND ACETAMINOPHEN 1 TABLET: 10; 325 TABLET ORAL at 17:15

## 2019-01-01 RX ADMIN — SODIUM CHLORIDE 10 ML: 9 INJECTION, SOLUTION INTRAMUSCULAR; INTRAVENOUS; SUBCUTANEOUS at 14:33

## 2019-01-01 RX ADMIN — INSULIN LISPRO 4 UNITS: 100 INJECTION, SOLUTION INTRAVENOUS; SUBCUTANEOUS at 17:20

## 2019-01-01 RX ADMIN — OXYCODONE HYDROCHLORIDE AND ACETAMINOPHEN 1 TABLET: 10; 325 TABLET ORAL at 01:50

## 2019-01-01 RX ADMIN — INSULIN GLARGINE 22 UNITS: 100 INJECTION, SOLUTION SUBCUTANEOUS at 09:17

## 2019-01-01 RX ADMIN — METHOCARBAMOL 500 MG: 500 TABLET, FILM COATED ORAL at 06:04

## 2019-01-01 RX ADMIN — OXYBUTYNIN CHLORIDE 5 MG: 5 TABLET ORAL at 09:04

## 2019-01-01 RX ADMIN — INSULIN LISPRO 2 UNITS: 100 INJECTION, SOLUTION INTRAVENOUS; SUBCUTANEOUS at 12:27

## 2019-01-01 RX ADMIN — CALCIUM CITRATE 200 MG (950 MG) TABLET 200 MG: at 09:49

## 2019-01-01 RX ADMIN — DIPHENHYDRAMINE HYDROCHLORIDE 25 MG: 50 INJECTION INTRAMUSCULAR; INTRAVENOUS at 01:10

## 2019-01-01 RX ADMIN — Medication 250 MG: at 08:41

## 2019-01-01 RX ADMIN — BUDESONIDE 1000 MCG: 1 SUSPENSION RESPIRATORY (INHALATION) at 19:57

## 2019-01-01 RX ADMIN — ACETAMINOPHEN 1000 MG: 500 TABLET ORAL at 18:02

## 2019-01-01 RX ADMIN — ALLOPURINOL 100 MG: 100 TABLET ORAL at 08:05

## 2019-01-01 RX ADMIN — Medication 250 MG: at 08:18

## 2019-01-01 RX ADMIN — MULTIPLE VITAMINS W/ MINERALS TAB 1 TABLET: TAB at 09:05

## 2019-01-01 RX ADMIN — ONDANSETRON 4 MG: 2 INJECTION INTRAMUSCULAR; INTRAVENOUS at 22:26

## 2019-01-01 RX ADMIN — METHOCARBAMOL 500 MG: 500 TABLET, FILM COATED ORAL at 21:45

## 2019-01-01 RX ADMIN — FLUTICASONE FUROATE AND VILANTEROL TRIFENATATE 1 PUFF: 100; 25 POWDER RESPIRATORY (INHALATION) at 08:15

## 2019-01-01 RX ADMIN — OXYBUTYNIN CHLORIDE 5 MG: 5 TABLET ORAL at 18:43

## 2019-01-01 RX ADMIN — METHADONE HYDROCHLORIDE 20 MG: 10 TABLET ORAL at 08:42

## 2019-01-01 RX ADMIN — CALCIUM CITRATE 200 MG (950 MG) TABLET 200 MG: at 17:21

## 2019-01-01 RX ADMIN — CALCIUM CITRATE 200 MG (950 MG) TABLET 200 MG: at 10:31

## 2019-01-01 RX ADMIN — OXYCODONE HYDROCHLORIDE AND ACETAMINOPHEN 1 TABLET: 10; 325 TABLET ORAL at 19:20

## 2019-01-01 RX ADMIN — METHOCARBAMOL 500 MG: 500 TABLET, FILM COATED ORAL at 05:40

## 2019-01-01 RX ADMIN — DOCUSATE SODIUM 100 MG: 100 CAPSULE, LIQUID FILLED ORAL at 10:41

## 2019-01-01 RX ADMIN — METHOCARBAMOL 500 MG: 500 TABLET, FILM COATED ORAL at 21:18

## 2019-01-01 RX ADMIN — Medication 5 TABLET: at 09:49

## 2019-01-01 RX ADMIN — FAMOTIDINE 20 MG: 20 TABLET ORAL at 08:01

## 2019-01-01 RX ADMIN — OXYBUTYNIN CHLORIDE 5 MG: 5 TABLET ORAL at 17:29

## 2019-01-01 RX ADMIN — HYDRALAZINE HYDROCHLORIDE 10 MG: 20 INJECTION INTRAMUSCULAR; INTRAVENOUS at 18:41

## 2019-01-01 RX ADMIN — SERTRALINE HYDROCHLORIDE 50 MG: 50 TABLET ORAL at 10:13

## 2019-01-01 RX ADMIN — Medication 10 MG: at 12:17

## 2019-01-01 RX ADMIN — INSULIN GLARGINE 20 UNITS: 100 INJECTION, SOLUTION SUBCUTANEOUS at 09:51

## 2019-01-01 RX ADMIN — ASPIRIN 81 MG 81 MG: 81 TABLET ORAL at 10:31

## 2019-01-01 RX ADMIN — VECURONIUM BROMIDE 1 MG: 1 INJECTION, POWDER, LYOPHILIZED, FOR SOLUTION INTRAVENOUS at 12:24

## 2019-01-01 RX ADMIN — LUBIPROSTONE 24 MCG: 24 CAPSULE, GELATIN COATED ORAL at 08:41

## 2019-01-01 RX ADMIN — GUAIFENESIN 600 MG: 600 TABLET, EXTENDED RELEASE ORAL at 10:13

## 2019-01-01 RX ADMIN — ARIPIPRAZOLE 10 MG: 10 TABLET ORAL at 08:42

## 2019-01-01 RX ADMIN — IPRATROPIUM BROMIDE AND ALBUTEROL SULFATE 3 ML: .5; 3 SOLUTION RESPIRATORY (INHALATION) at 20:18

## 2019-01-01 RX ADMIN — INSULIN LISPRO 6 UNITS: 100 INJECTION, SOLUTION INTRAVENOUS; SUBCUTANEOUS at 18:06

## 2019-01-01 RX ADMIN — CLONIDINE HYDROCHLORIDE 0.1 MG: 0.1 TABLET ORAL at 20:17

## 2019-01-01 RX ADMIN — CALCIUM CITRATE 200 MG (950 MG) TABLET 200 MG: at 18:43

## 2019-01-01 RX ADMIN — METHOCARBAMOL 500 MG: 500 TABLET, FILM COATED ORAL at 05:35

## 2019-01-01 RX ADMIN — ACETAMINOPHEN 1000 MG: 500 TABLET ORAL at 05:54

## 2019-01-01 RX ADMIN — INSULIN GLARGINE 20 UNITS: 100 INJECTION, SOLUTION SUBCUTANEOUS at 08:47

## 2019-01-01 RX ADMIN — FAMOTIDINE 20 MG: 10 INJECTION, SOLUTION INTRAVENOUS at 22:55

## 2019-01-01 RX ADMIN — PROMETHAZINE HYDROCHLORIDE 25 MG: 25 INJECTION INTRAMUSCULAR; INTRAVENOUS at 21:24

## 2019-01-01 RX ADMIN — Medication 1 G: at 17:32

## 2019-01-01 RX ADMIN — ROSUVASTATIN CALCIUM 5 MG: 10 TABLET, FILM COATED ORAL at 21:49

## 2019-01-01 RX ADMIN — INSULIN GLARGINE 20 UNITS: 100 INJECTION, SOLUTION SUBCUTANEOUS at 09:00

## 2019-01-01 RX ADMIN — OXYBUTYNIN CHLORIDE 5 MG: 5 TABLET ORAL at 17:21

## 2019-01-01 RX ADMIN — INSULIN LISPRO 4 UNITS: 100 INJECTION, SOLUTION INTRAVENOUS; SUBCUTANEOUS at 12:39

## 2019-01-01 RX ADMIN — FAMOTIDINE 20 MG: 10 INJECTION, SOLUTION INTRAVENOUS at 08:22

## 2019-01-01 RX ADMIN — ROSUVASTATIN CALCIUM 5 MG: 5 TABLET, COATED ORAL at 21:36

## 2019-01-01 RX ADMIN — OXYCODONE HYDROCHLORIDE AND ACETAMINOPHEN 1 TABLET: 10; 325 TABLET ORAL at 10:23

## 2019-01-01 RX ADMIN — LEVOTHYROXINE SODIUM 75 MCG: 25 TABLET ORAL at 05:40

## 2019-01-01 RX ADMIN — OXYBUTYNIN CHLORIDE 5 MG: 5 TABLET ORAL at 19:20

## 2019-01-01 RX ADMIN — Medication 250 MG: at 09:04

## 2019-01-01 RX ADMIN — PHENYLEPHRINE HYDROCHLORIDE 200 MCG: 10 INJECTION INTRAVENOUS at 11:46

## 2019-01-01 RX ADMIN — METHADONE HYDROCHLORIDE 20 MG: 10 TABLET ORAL at 10:11

## 2019-01-01 RX ADMIN — Medication 5 TABLET: at 09:15

## 2019-01-01 RX ADMIN — GUAIFENESIN 600 MG: 600 TABLET, EXTENDED RELEASE ORAL at 08:05

## 2019-01-01 RX ADMIN — DIVALPROEX SODIUM 500 MG: 250 TABLET, DELAYED RELEASE ORAL at 21:34

## 2019-01-01 RX ADMIN — DOCUSATE SODIUM 100 MG: 100 CAPSULE, LIQUID FILLED ORAL at 08:01

## 2019-01-01 RX ADMIN — INSULIN LISPRO 6 UNITS: 100 INJECTION, SOLUTION INTRAVENOUS; SUBCUTANEOUS at 18:03

## 2019-01-01 RX ADMIN — INSULIN LISPRO 4 UNITS: 100 INJECTION, SOLUTION INTRAVENOUS; SUBCUTANEOUS at 17:28

## 2019-01-01 RX ADMIN — GUAIFENESIN 600 MG: 600 TABLET, EXTENDED RELEASE ORAL at 21:53

## 2019-01-01 RX ADMIN — GUAIFENESIN 600 MG: 600 TABLET, EXTENDED RELEASE ORAL at 21:45

## 2019-01-01 RX ADMIN — Medication 250 MG: at 10:31

## 2019-01-01 RX ADMIN — PREDNISONE 40 MG: 20 TABLET ORAL at 08:38

## 2019-01-01 RX ADMIN — LEVOTHYROXINE SODIUM 75 MCG: 25 TABLET ORAL at 06:12

## 2019-01-01 RX ADMIN — INSULIN LISPRO 4 UNITS: 100 INJECTION, SOLUTION INTRAVENOUS; SUBCUTANEOUS at 12:00

## 2019-01-01 RX ADMIN — PREDNISONE 30 MG: 5 TABLET ORAL at 10:12

## 2019-01-01 RX ADMIN — INSULIN LISPRO 4 UNITS: 100 INJECTION, SOLUTION INTRAVENOUS; SUBCUTANEOUS at 08:41

## 2019-01-01 RX ADMIN — METHOCARBAMOL 500 MG: 500 TABLET, FILM COATED ORAL at 21:51

## 2019-01-01 RX ADMIN — OXYBUTYNIN CHLORIDE 5 MG: 5 TABLET ORAL at 17:27

## 2019-01-01 RX ADMIN — VANCOMYCIN HYDROCHLORIDE 1 G: 10 INJECTION, POWDER, LYOPHILIZED, FOR SOLUTION INTRAVENOUS at 11:30

## 2019-01-01 RX ADMIN — FAMOTIDINE 20 MG: 10 INJECTION, SOLUTION INTRAVENOUS at 08:37

## 2019-01-01 RX ADMIN — ALLOPURINOL 100 MG: 100 TABLET ORAL at 08:25

## 2019-01-01 RX ADMIN — ARIPIPRAZOLE 10 MG: 10 TABLET ORAL at 09:13

## 2019-01-01 RX ADMIN — DIVALPROEX SODIUM 500 MG: 250 TABLET, DELAYED RELEASE ORAL at 02:45

## 2019-01-01 RX ADMIN — OXYCODONE HYDROCHLORIDE AND ACETAMINOPHEN 1 TABLET: 10; 325 TABLET ORAL at 06:03

## 2019-01-01 RX ADMIN — METHOCARBAMOL 500 MG: 500 TABLET, FILM COATED ORAL at 15:17

## 2019-01-01 RX ADMIN — FERROUS SULFATE TAB 325 MG (65 MG ELEMENTAL FE) 325 MG: 325 (65 FE) TAB at 08:26

## 2019-01-01 RX ADMIN — FAMOTIDINE 20 MG: 20 TABLET ORAL at 09:11

## 2019-01-01 RX ADMIN — OXYCODONE HYDROCHLORIDE AND ACETAMINOPHEN 1 TABLET: 10; 325 TABLET ORAL at 05:07

## 2019-01-01 RX ADMIN — SODIUM CHLORIDE 10 ML: 9 INJECTION, SOLUTION INTRAMUSCULAR; INTRAVENOUS; SUBCUTANEOUS at 22:55

## 2019-01-01 RX ADMIN — METHOCARBAMOL 500 MG: 500 TABLET, FILM COATED ORAL at 13:05

## 2019-01-01 RX ADMIN — LEVOTHYROXINE SODIUM 75 MCG: 75 TABLET ORAL at 05:43

## 2019-01-01 RX ADMIN — FERROUS SULFATE TAB 325 MG (65 MG ELEMENTAL FE) 325 MG: 325 (65 FE) TAB at 08:18

## 2019-01-01 RX ADMIN — SENNOSIDES AND DOCUSATE SODIUM 2 TABLET: 8.6; 5 TABLET ORAL at 17:37

## 2019-01-01 RX ADMIN — SERTRALINE HYDROCHLORIDE 50 MG: 50 TABLET ORAL at 08:43

## 2019-01-01 RX ADMIN — OXYBUTYNIN CHLORIDE 5 MG: 5 TABLET ORAL at 17:17

## 2019-01-01 RX ADMIN — CALCIUM CITRATE 200 MG (950 MG) TABLET 200 MG: at 08:44

## 2019-01-01 RX ADMIN — OXYCODONE HYDROCHLORIDE AND ACETAMINOPHEN 1 TABLET: 10; 325 TABLET ORAL at 22:18

## 2019-01-01 RX ADMIN — CALCIUM CITRATE 200 MG (950 MG) TABLET 200 MG: at 17:37

## 2019-01-01 RX ADMIN — DIVALPROEX SODIUM 500 MG: 250 TABLET, DELAYED RELEASE ORAL at 22:42

## 2019-01-01 RX ADMIN — SERTRALINE HYDROCHLORIDE 50 MG: 50 TABLET ORAL at 08:25

## 2019-01-01 RX ADMIN — LEVOTHYROXINE SODIUM 75 MCG: 75 TABLET ORAL at 05:54

## 2019-01-01 RX ADMIN — ALLOPURINOL 100 MG: 100 TABLET ORAL at 09:50

## 2019-01-01 RX ADMIN — Medication 5 TABLET: at 08:21

## 2019-01-01 RX ADMIN — Medication 10 MG: at 12:25

## 2019-01-01 RX ADMIN — SERTRALINE HYDROCHLORIDE 50 MG: 50 TABLET ORAL at 08:21

## 2019-01-01 RX ADMIN — POTASSIUM CHLORIDE 40 MEQ: 20 TABLET, EXTENDED RELEASE ORAL at 10:11

## 2019-01-01 RX ADMIN — SERTRALINE HYDROCHLORIDE 50 MG: 50 TABLET ORAL at 09:01

## 2019-01-01 RX ADMIN — METHOCARBAMOL 500 MG: 500 TABLET, FILM COATED ORAL at 21:59

## 2019-01-01 RX ADMIN — FAMOTIDINE 20 MG: 20 TABLET ORAL at 08:22

## 2019-01-01 RX ADMIN — IPRATROPIUM BROMIDE AND ALBUTEROL SULFATE 3 ML: .5; 3 SOLUTION RESPIRATORY (INHALATION) at 06:50

## 2019-01-01 RX ADMIN — ROSUVASTATIN CALCIUM 5 MG: 10 TABLET, FILM COATED ORAL at 21:18

## 2019-01-01 RX ADMIN — DOCUSATE SODIUM 100 MG: 100 CAPSULE, LIQUID FILLED ORAL at 08:05

## 2019-01-01 RX ADMIN — OXYCODONE HYDROCHLORIDE AND ACETAMINOPHEN 1 TABLET: 10; 325 TABLET ORAL at 05:35

## 2019-01-01 RX ADMIN — FAMOTIDINE 20 MG: 20 TABLET ORAL at 08:05

## 2019-01-01 RX ADMIN — PREDNISONE 40 MG: 20 TABLET ORAL at 08:22

## 2019-01-01 RX ADMIN — ACETAMINOPHEN 1000 MG: 500 TABLET ORAL at 01:25

## 2019-01-01 RX ADMIN — FAMOTIDINE 20 MG: 20 TABLET ORAL at 17:38

## 2019-01-01 RX ADMIN — OXYCODONE HYDROCHLORIDE AND ACETAMINOPHEN 1 TABLET: 10; 325 TABLET ORAL at 10:02

## 2019-01-01 RX ADMIN — LEVOTHYROXINE SODIUM 75 MCG: 25 TABLET ORAL at 06:06

## 2019-01-01 RX ADMIN — FAMOTIDINE 20 MG: 10 INJECTION, SOLUTION INTRAVENOUS at 20:59

## 2019-01-01 RX ADMIN — POTASSIUM CHLORIDE 40 MEQ: 20 TABLET, EXTENDED RELEASE ORAL at 10:10

## 2019-01-01 RX ADMIN — ACETAMINOPHEN 1000 MG: 500 TABLET ORAL at 17:30

## 2019-01-01 RX ADMIN — CEFEPIME 1 G: 1 INJECTION, POWDER, FOR SOLUTION INTRAMUSCULAR; INTRAVENOUS at 01:24

## 2019-01-01 RX ADMIN — OXYCODONE HYDROCHLORIDE AND ACETAMINOPHEN 1 TABLET: 10; 325 TABLET ORAL at 12:46

## 2019-01-01 RX ADMIN — SODIUM CHLORIDE: 900 INJECTION, SOLUTION INTRAVENOUS at 10:55

## 2019-01-01 RX ADMIN — ARFORMOTEROL TARTRATE 15 MCG: 15 SOLUTION RESPIRATORY (INHALATION) at 20:50

## 2019-01-01 RX ADMIN — SODIUM CHLORIDE 300 MG: 900 INJECTION, SOLUTION INTRAVENOUS at 14:33

## 2019-01-01 RX ADMIN — INSULIN LISPRO 3 UNITS: 100 INJECTION, SOLUTION INTRAVENOUS; SUBCUTANEOUS at 12:30

## 2019-01-01 RX ADMIN — ARFORMOTEROL TARTRATE 15 MCG: 15 SOLUTION RESPIRATORY (INHALATION) at 22:25

## 2019-01-01 RX ADMIN — METHADONE HYDROCHLORIDE 20 MG: 10 TABLET ORAL at 08:01

## 2019-01-01 RX ADMIN — OXYBUTYNIN CHLORIDE 5 MG: 5 TABLET ORAL at 17:15

## 2019-01-01 RX ADMIN — LIDOCAINE HYDROCHLORIDE 20 MG: 20 INJECTION, SOLUTION EPIDURAL; INFILTRATION; INTRACAUDAL; PERINEURAL at 04:57

## 2019-01-01 RX ADMIN — INSULIN LISPRO 2 UNITS: 100 INJECTION, SOLUTION INTRAVENOUS; SUBCUTANEOUS at 12:26

## 2019-01-01 RX ADMIN — ASPIRIN 81 MG 81 MG: 81 TABLET ORAL at 08:41

## 2019-01-01 RX ADMIN — GUAIFENESIN 600 MG: 600 TABLET, EXTENDED RELEASE ORAL at 22:00

## 2019-01-01 RX ADMIN — INSULIN LISPRO 3 UNITS: 100 INJECTION, SOLUTION INTRAVENOUS; SUBCUTANEOUS at 09:17

## 2019-01-01 RX ADMIN — FLUTICASONE FUROATE AND VILANTEROL TRIFENATATE 1 PUFF: 100; 25 POWDER RESPIRATORY (INHALATION) at 10:12

## 2019-01-01 RX ADMIN — OXYCODONE HYDROCHLORIDE AND ACETAMINOPHEN 1 TABLET: 10; 325 TABLET ORAL at 07:11

## 2019-01-01 RX ADMIN — METHOCARBAMOL 500 MG: 500 TABLET, FILM COATED ORAL at 21:33

## 2019-01-01 RX ADMIN — INSULIN LISPRO 4 UNITS: 100 INJECTION, SOLUTION INTRAVENOUS; SUBCUTANEOUS at 17:13

## 2019-01-01 RX ADMIN — CALCIUM CITRATE 200 MG (950 MG) TABLET 200 MG: at 08:05

## 2019-01-01 RX ADMIN — DIVALPROEX SODIUM 500 MG: 250 TABLET, DELAYED RELEASE ORAL at 22:18

## 2019-01-01 RX ADMIN — OXYCODONE HYDROCHLORIDE AND ACETAMINOPHEN 1 TABLET: 10; 325 TABLET ORAL at 10:31

## 2019-01-01 RX ADMIN — ALLOPURINOL 100 MG: 100 TABLET ORAL at 08:01

## 2019-01-01 RX ADMIN — FERROUS SULFATE TAB 325 MG (65 MG ELEMENTAL FE) 325 MG: 325 (65 FE) TAB at 08:05

## 2019-01-01 RX ADMIN — INSULIN GLARGINE 25 UNITS: 100 INJECTION, SOLUTION SUBCUTANEOUS at 10:43

## 2019-01-01 RX ADMIN — METHOCARBAMOL 500 MG: 500 TABLET, FILM COATED ORAL at 22:42

## 2019-01-01 RX ADMIN — INSULIN LISPRO 4 UNITS: 100 INJECTION, SOLUTION INTRAVENOUS; SUBCUTANEOUS at 13:17

## 2019-01-01 RX ADMIN — Medication 1 TABLET: at 08:38

## 2019-01-01 RX ADMIN — PREDNISONE 30 MG: 5 TABLET ORAL at 10:31

## 2019-01-01 RX ADMIN — INSULIN LISPRO 6 UNITS: 100 INJECTION, SOLUTION INTRAVENOUS; SUBCUTANEOUS at 07:47

## 2019-01-01 RX ADMIN — INSULIN LISPRO 4 UNITS: 100 INJECTION, SOLUTION INTRAVENOUS; SUBCUTANEOUS at 17:09

## 2019-01-01 RX ADMIN — DIVALPROEX SODIUM 500 MG: 250 TABLET, DELAYED RELEASE ORAL at 21:33

## 2019-01-01 RX ADMIN — METHADONE HYDROCHLORIDE 20 MG: 10 TABLET ORAL at 08:05

## 2019-01-01 RX ADMIN — DOCUSATE SODIUM 100 MG: 100 CAPSULE, LIQUID FILLED ORAL at 08:25

## 2019-01-01 RX ADMIN — FAMOTIDINE 20 MG: 10 INJECTION, SOLUTION INTRAVENOUS at 21:35

## 2019-01-01 RX ADMIN — ACETAMINOPHEN 1000 MG: 500 TABLET ORAL at 23:12

## 2019-01-01 RX ADMIN — LORAZEPAM 1 MG: 2 INJECTION INTRAMUSCULAR; INTRAVENOUS at 00:34

## 2019-01-01 RX ADMIN — SODIUM CHLORIDE 10 ML: 9 INJECTION, SOLUTION INTRAMUSCULAR; INTRAVENOUS; SUBCUTANEOUS at 14:39

## 2019-01-01 RX ADMIN — OXYBUTYNIN CHLORIDE 5 MG: 5 TABLET ORAL at 18:06

## 2019-01-01 RX ADMIN — DIVALPROEX SODIUM 250 MG: 250 TABLET, DELAYED RELEASE ORAL at 22:55

## 2019-01-01 RX ADMIN — INSULIN GLARGINE 25 UNITS: 100 INJECTION, SOLUTION SUBCUTANEOUS at 08:26

## 2019-01-01 RX ADMIN — FAMOTIDINE 20 MG: 20 TABLET ORAL at 09:49

## 2019-01-01 RX ADMIN — METHOCARBAMOL 500 MG: 500 TABLET, FILM COATED ORAL at 02:46

## 2019-01-01 RX ADMIN — ACETAMINOPHEN 975 MG: 325 TABLET ORAL at 17:06

## 2019-01-01 RX ADMIN — ARFORMOTEROL TARTRATE 15 MCG: 15 SOLUTION RESPIRATORY (INHALATION) at 20:55

## 2019-01-01 RX ADMIN — OXYBUTYNIN CHLORIDE 5 MG: 5 TABLET ORAL at 09:09

## 2019-01-01 RX ADMIN — SUCCINYLCHOLINE CHLORIDE 100 MG: 20 INJECTION, SOLUTION INTRAMUSCULAR; INTRAVENOUS at 10:58

## 2019-01-01 RX ADMIN — FLUTICASONE FUROATE AND VILANTEROL TRIFENATATE 1 PUFF: 100; 25 POWDER RESPIRATORY (INHALATION) at 08:35

## 2019-01-01 RX ADMIN — OXYBUTYNIN CHLORIDE 5 MG: 5 TABLET ORAL at 09:49

## 2019-01-01 RX ADMIN — FAMOTIDINE 20 MG: 20 TABLET ORAL at 08:24

## 2019-01-01 RX ADMIN — FLUTICASONE FUROATE AND VILANTEROL TRIFENATATE 1 PUFF: 100; 25 POWDER RESPIRATORY (INHALATION) at 15:16

## 2019-01-01 RX ADMIN — METHADONE HYDROCHLORIDE 20 MG: 10 TABLET ORAL at 09:16

## 2019-01-01 RX ADMIN — DIPHENHYDRAMINE HYDROCHLORIDE 25 MG: 50 INJECTION, SOLUTION INTRAMUSCULAR; INTRAVENOUS at 01:10

## 2019-01-01 RX ADMIN — SODIUM CHLORIDE 100 ML/HR: 900 INJECTION, SOLUTION INTRAVENOUS at 03:48

## 2019-01-01 RX ADMIN — ROSUVASTATIN CALCIUM 5 MG: 10 TABLET, FILM COATED ORAL at 22:13

## 2019-01-01 RX ADMIN — PREDNISONE 30 MG: 5 TABLET ORAL at 09:03

## 2019-01-01 RX ADMIN — Medication 10 MG: at 12:32

## 2019-01-01 RX ADMIN — SENNOSIDES AND DOCUSATE SODIUM 2 TABLET: 8.6; 5 TABLET ORAL at 17:21

## 2019-01-01 RX ADMIN — BUPRENORPHINE AND NALOXONE 1 TABLET: 8; 2 TABLET SUBLINGUAL at 22:59

## 2019-01-01 RX ADMIN — METHOCARBAMOL 500 MG: 500 TABLET, FILM COATED ORAL at 05:07

## 2019-01-01 RX ADMIN — PHENYLEPHRINE HYDROCHLORIDE 100 MCG: 10 INJECTION INTRAVENOUS at 12:48

## 2019-01-01 RX ADMIN — BUDESONIDE 1000 MCG: 1 SUSPENSION RESPIRATORY (INHALATION) at 08:16

## 2019-01-01 RX ADMIN — LEVOTHYROXINE SODIUM 75 MCG: 25 TABLET ORAL at 05:37

## 2019-01-01 RX ADMIN — ROSUVASTATIN CALCIUM 5 MG: 10 TABLET, FILM COATED ORAL at 21:33

## 2019-01-01 RX ADMIN — FAMOTIDINE 20 MG: 10 INJECTION, SOLUTION INTRAVENOUS at 09:01

## 2019-01-01 RX ADMIN — SODIUM CHLORIDE 1000 MG: 900 INJECTION, SOLUTION INTRAVENOUS at 11:48

## 2019-01-01 RX ADMIN — INSULIN LISPRO 4 UNITS: 100 INJECTION, SOLUTION INTRAVENOUS; SUBCUTANEOUS at 09:40

## 2019-01-01 RX ADMIN — INSULIN LISPRO 8 UNITS: 100 INJECTION, SOLUTION INTRAVENOUS; SUBCUTANEOUS at 17:10

## 2019-01-01 RX ADMIN — POLYETHYLENE GLYCOL 3350 17 G: 17 POWDER, FOR SOLUTION ORAL at 08:37

## 2019-01-01 RX ADMIN — PHENYLEPHRINE HYDROCHLORIDE 100 MCG: 10 INJECTION INTRAVENOUS at 12:30

## 2019-01-31 ENCOUNTER — OFFICE VISIT (OUTPATIENT)
Dept: PULMONOLOGY | Age: 64
End: 2019-01-31

## 2019-01-31 VITALS
HEART RATE: 100 BPM | RESPIRATION RATE: 18 BRPM | HEIGHT: 65 IN | SYSTOLIC BLOOD PRESSURE: 90 MMHG | WEIGHT: 160 LBS | OXYGEN SATURATION: 97 % | DIASTOLIC BLOOD PRESSURE: 62 MMHG | TEMPERATURE: 98.4 F | BODY MASS INDEX: 26.66 KG/M2

## 2019-01-31 DIAGNOSIS — J44.9 COPD, SEVERE (HCC): ICD-10-CM

## 2019-01-31 DIAGNOSIS — E08.21: ICD-10-CM

## 2019-01-31 DIAGNOSIS — Z99.81 HYPOXEMIA REQUIRING SUPPLEMENTAL OXYGEN: Primary | ICD-10-CM

## 2019-01-31 DIAGNOSIS — N18.9 CHRONIC KIDNEY DISEASE, UNSPECIFIED CKD STAGE: ICD-10-CM

## 2019-01-31 DIAGNOSIS — F17.200 TOBACCO USE DISORDER: ICD-10-CM

## 2019-01-31 DIAGNOSIS — R09.02 HYPOXEMIA REQUIRING SUPPLEMENTAL OXYGEN: Primary | ICD-10-CM

## 2019-01-31 RX ORDER — PREDNISONE 10 MG/1
TABLET ORAL
Qty: 18 TAB | Refills: 0 | Status: SHIPPED | OUTPATIENT
Start: 2019-01-31 | End: 2019-02-17

## 2019-01-31 RX ORDER — ALBUTEROL SULFATE 90 UG/1
AEROSOL, METERED RESPIRATORY (INHALATION)
Qty: 1 INHALER | Refills: 5 | Status: SHIPPED | OUTPATIENT
Start: 2019-01-31 | End: 2020-01-01

## 2019-01-31 RX ORDER — ALBUTEROL SULFATE 0.83 MG/ML
SOLUTION RESPIRATORY (INHALATION)
Qty: 2 PACKAGE | Refills: 3 | Status: ON HOLD | OUTPATIENT
Start: 2019-01-31 | End: 2020-01-01 | Stop reason: CLARIF

## 2019-01-31 NOTE — PROGRESS NOTES
HISTORY OF PRESENT ILLNESS  Chicho Mancia is a 59 y.o. female. History of Sarcoidosis and COPD, last FEV 1 in 2012 was 51%. Pt ran out of Anoro and has been off Prednisone but now complains of increased SOB and a cough productive of yellowish phlegm for 2 weeks. She denies fever. Uses O2 despite continued smoking. Pt with history of CKD but reports improvement in renal function per nephrology. Pt reports improved glycemic control with A1C of 6.1 after significant weight loss, she is on a strict diet due to gout but had a recent flare up after dietary indiscretion. Pt admits to smoking 1 pack of cigarettes daily but no further use of recreational drugs. Shortness of Breath   The history is provided by the patient. This is a chronic problem. The problem occurs frequently. Episode onset: years. The problem has been gradually improving. Associated symptoms include cough and wheezing. Pertinent negatives include no headaches, no sore throat, no ear pain, no neck pain, no hemoptysis, no PND, no chest pain, no syncope, no vomiting, no abdominal pain, no rash, no leg pain and no claudication. Sputum production: occasional. Orthopnea: mild  Leg swelling: chronic. The problem's precipitants include exercise and weather/humidity. Treatments tried: Anoro and Albuterol prn. The treatment provided moderate relief. Review of Systems   Constitutional: Positive for malaise/fatigue. Negative for chills, diaphoresis and weight loss. HENT: Negative for congestion, ear discharge, ear pain, hearing loss, nosebleeds, sinus pain, sore throat and tinnitus. Eyes: Negative for blurred vision, double vision, photophobia, pain, discharge and redness. Respiratory: Positive for cough, shortness of breath and wheezing. Negative for hemoptysis and stridor. Sputum production: occasional.    Cardiovascular: Negative for chest pain, palpitations, claudication, syncope and PND. Orthopnea: mild  Leg swelling: chronic. Gastrointestinal: Negative for abdominal pain, blood in stool, constipation, diarrhea, heartburn, melena, nausea and vomiting. Genitourinary: Negative for dysuria, flank pain, frequency, hematuria and urgency. Musculoskeletal: Positive for back pain and joint pain. Negative for falls, myalgias and neck pain. Skin: Negative for itching and rash. Neurological: Negative for dizziness, tingling, tremors, sensory change, speech change, focal weakness, seizures, loss of consciousness, weakness and headaches. Endo/Heme/Allergies: Negative for environmental allergies and polydipsia. Does not bruise/bleed easily. Psychiatric/Behavioral: Positive for depression and substance abuse. Negative for hallucinations, memory loss and suicidal ideas. The patient is not nervous/anxious and does not have insomnia.       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 congestive heart failure (HCC)     Better controlled     Bipolar affective disorder ([facility] 12/5/2012    Cellulitis of right forearm 5/4/2017    Chronic back pain     Chronic lung disease     Chronic obstructive pulmonary disease (COPD) ([facility])     SOB, on paula O2 Recent admission with psychosis     CKD stage G3a/A1, GFR 45-59 and albumin creatinine ratio <30 mg/g ([facility]) 5/11/2017    Urine microalbumin/Creatinine ratio (5/11/2017) = 9 mg/g    Contusion of left elbow 5/4/2017    Depression     Diabetes mellitus ([facility]     Diabetic neuropathy associated with type 2 diabetes mellitus (HCC)     Diastolic dysfunction without heart failure     Stable on diuretics     Falls frequently     Gastroesophageal reflux disease with hiatal hernia     Generalized osteoarthritis of multiple sites     Gout     History of acute renal failure 5/31/2013    History of back injury     jumped out of second story window     History of hepatitis C     treated    History of penicillin allergy     History of vitamin D deficiency 5/10/2017    Hyperuricemia 5/26/2017    Hypothyroidism     Hypoxemia requiring supplemental oxygen 12/29/2014    Intravenous drug user 5/2/2017    Memory difficulty     Mixed connective tissue disease ([facility] [address] 5/10/2017    Obesity, Class I, BMI 30-34.9     Olecranon bursitis of right elbow 5/4/2017    Recurrent genital herpes 5/31/2013    Right Achilles tendinitis 5/2/2017    Sarcoidosis     Sickle cell trait (HCC)     Type 2 diabetes with stage 3 chronic kidney disease GFR 30-59 ([facility])     Urge urinary incontinence 5/10/2017    Venous insufficiency     Wears glasses      Current Outpatient Medications on File Prior to Visit   Medication Sig Dispense Refill    rosuvastatin (CRESTOR) 5 mg tablet TAKE ONE TABLET NIGHTLY 90 Tab 3    oxybutynin (DITROPAN) 5 mg tablet Take 5 mg by mouth two (2) times a day.  metOLazone (ZAROXOLYN) 2.5 mg tablet Take 2.5 mg by mouth every fourty-eight (48) hours.  allopurinol (ZYLOPRIM) 100 mg tablet Take  by mouth daily.  traMADol (ULTRAM) 50 mg tablet       famotidine (PEPCID) 20 mg tablet Take 1 Tab by mouth daily. Indications: PREVENTION OF STRESS ULCER 15 Tab 0    insulin glargine (LANTUS) 100 unit/mL injection Inject 70 units subcutaneously before breakfast (7AM) and 40 units subcutaneously after dinner (7PM). Indications: type 2 diabetes mellitus 1 Vial 0    insulin lispro (HUMALOG) 100 unit/mL injection To be given 15 min before meals: < 150 - None, 151-200 - 2 u, 201-250 - 4 u, 251-300 - 6 u, 301-350 - 8 u, 351-400 - 10 u, >400 - 12 u 1 Vial 0    aspirin 81 mg chewable tablet Take 1 Tab by mouth daily (with breakfast).  Indications: prevention of cerebrovascular accident 13 Tab 0    levothyroxine (SYNTHROID) 100 mcg tablet Take 1 Tab by mouth Daily (before breakfast). Indications: hypothyroidism 15 Tab 0    cholecalciferol (VITAMIN D3) 1,000 unit tablet Take 2 Tabs by mouth daily. Indications: PREVENTION OF VITAMIN D DEFICIENCY 30 Tab 0    divalproex DR (DEPAKOTE) 250 mg tablet Take 250 mg by mouth nightly. Indications: BIPOLAR DISORDER IN REMISSION      insulin syringe,safetyneedle 1 mL 30 gauge x 5/16\" syrg As directed 50 Each 0    ARIPiprazole (ABILIFY) 5 mg tablet Take 10 mg by mouth daily. Indications: BIPOLAR DISORDER IN REMISSION      gabapentin (NEURONTIN) 300 mg capsule Take 300 mg by mouth daily. Indications: NEUROPATHIC PAIN      acetaminophen (TYLENOL) 325 mg tablet Take 325 mg by mouth every six (6) hours as needed for Pain. Indications: Fever, Pain      traZODone (DESYREL) 100 mg tablet Take 100 mg by mouth as needed. Indications: major depressive disorder      Nebulizer Accessories Kit Use with nebulizer machine 1 Kit prn    sertraline (ZOLOFT) 100 mg tablet Take 50 mg by mouth daily. Indications: DEPRESSION ASSOCIATED WITH BIPOLAR DISORDER      Oxygen-Air Delivery Systems by Nasal route continuous. 2 LPM via NC  Indications: Hypoxemia requiring supplementary oxygen      azithromycin (ZITHROMAX) 250 mg tablet Take 2 tabs by mouth on day one then 1 tab by mouth daily 6 Tab 0    oxyCODONE-acetaminophen (PERCOCET) 5-325 mg per tablet Take 1 Tab by mouth every six (6) hours as needed for Pain. Max Daily Amount: 4 Tabs. Indications: Pain 15 Tab 0     No current facility-administered medications on file prior to visit.       Allergies   Allergen Reactions    Moxifloxacin Rash    Pcn [Penicillins] Swelling    Sulfa (Sulfonamide Antibiotics) Swelling     Social History     Socioeconomic History    Marital status: SINGLE     Spouse name: Not on file    Number of children: Not on file    Years of education: Not on file    Highest education level: Not on file   Social Needs    Financial resource strain: Not on file   Anika-Adriana insecurity - worry: Not on file    Food insecurity - inability: Not on file    Transportation needs - medical: Not on file   mig33 needs - non-medical: Not on file   Occupational History    Occupation: Not employed     Employer: NOT EMPLOYED   Tobacco Use    Smoking status: Current Every Day Smoker     Packs/day: 0.50     Years: 30.00     Pack years: 15.00     Types: Cigarettes     Start date: 12/2/1969    Smokeless tobacco: Never Used   Substance and Sexual Activity    Alcohol use: No    Drug use: No     Comment: +Cocaine Screen 2013    Sexual activity: Not on file     Comment: 2014   Other Topics Concern    Not on file   Social History Narrative    Lives with 15year old son. Blood pressure 90/62, pulse 100, temperature 98.4 °F (36.9 °C), temperature source Oral, resp. rate 18, height 5' 5\" (1.651 m), weight 72.6 kg (160 lb), last menstrual period 11/25/2012, SpO2 97 %. Physical Exam   Constitutional: She is oriented to person, place, and time. She appears well-developed and well-nourished. No distress. HENT:   Head: Normocephalic. Nose: Nose normal.   Mouth/Throat: Oropharynx is clear and moist. No oropharyngeal exudate. Eyes: Conjunctivae are normal. Pupils are equal, round, and reactive to light. Right eye exhibits no discharge. Left eye exhibits no discharge. No scleral icterus. Neck: No JVD present. No tracheal deviation present. No thyromegaly present. Cardiovascular: Normal rate, regular rhythm and intact distal pulses. Exam reveals no gallop. No murmur heard. Pulmonary/Chest: Effort normal. No stridor. No respiratory distress. Wheezes: faint right greater than left upper lung fields  She has no rales. She exhibits no tenderness. Distant breath sounds   Abdominal: Soft. She exhibits no distension and no mass. There is no tenderness. There is no rebound. Musculoskeletal: She exhibits no tenderness or deformity. Edema: trace.    Lymphadenopathy:     She has no cervical adenopathy. Neurological: She is alert and oriented to person, place, and time. Coordination normal.   Skin: Skin is warm and dry. No rash noted. She is not diaphoretic. No erythema. No pallor. Psychiatric: She has a normal mood and affect. Her behavior is normal. Judgment and thought content normal.       CT Results (most recent):  Results from Hospital Encounter encounter on 02/06/18   CT CHEST WO CONT    Narrative Noncontrast CT chest    CPT code 58905    CLINICAL HISTORY: Abnormal chest x-ray in a smoker    TECHNIQUE: 5 mm helical MDCT scan of the chest without contrast. Sagittal and  coronal reformations then created with original data set. All CT scans at this  facility are performed using dose optimization techniques as appropriate to a  performed exam, to include automated exposure control, adjustment of the mA  and/or kV according to patient's size (including appropriate matching for site  specific examinations), or use of iterative reconstruction technique. COMPARISON: 7/27/2016, chest x-ray 1/26/2018    FINDINGS: Lungs: Panacinar emphysema upper lobes. Fibrotic scarring left apex. Lung cysts both lower lobes. Honeycombing right lung base, with worsening since  2016. Gillie Drones 5 mm nodule right lower lobe adjacent to pleura (34) stable since  7/27/2016. Airways: Peribronchial thickening around the elan. Bony skeleton: Old healed left anterolateral rib fractures; old pseudoarthrosis  right posterior 11th rib fracture. Healing left posterolateral fifth rib  fracture (20), new since prior study. New marked compression deformity T5. Surrounding haziness in the posterior mediastinal fat. No bony spinal stenosis  as a result. Otherwise no loss of vertebral body height. Heart and pericardium: Mild calcifications mitral annulus, left coronary artery. Great vessels: Unremarkable for age. Lymph nodes: Small lymph nodes in the middle mediastinum, not pathologically  enlarged.     Pleural space: No effusions. Upper abdomen: Stable well-defined 2.4 cm hypodensity superiorly in the spleen. A 1.4 cm hypodensity inferiorly in the spleen not seen previously, measuring  water attenuation. Remainder included solid organs and hollow viscera are  unremarkable. Impression IMPRESSION: Emphysema and fibrosis at the lung bases, worsened particularly on  the right since 2016. No new infiltrate. 5 mm right lower lobe nodule stable since prior study in 2016, probably benign. New compression fracture T5 and new healing left posterolateral fifth rib  fracture. Splenic cysts including a new one inferiorly. 6 minute walk test: reduced 6 MWD with desaturation relieved by supplemental O2  ASSESSMENT and PLAN  Encounter Diagnoses   Name Primary?  Hypoxemia requiring supplemental oxygen Yes    COPD, severe ([address]     Tobacco use disorder     Diabetes mellitus due to underlying condition, controlled, with diabetic nephropathy, unspecified whether long term insulin use ([facility] [address] [facility]     Chronic kidney disease, unspecified CKD stage        Continue current medications. Rx for Anoro renewed, sample given. Continue  FiO2 titrated to saturation greater than 90%. Rx for Prednisone taper as pt in mild exacerbation. Strongly counseled on need for smoking cessation. Form for utility company filled out.   RTC 6 months  Viraj Foley on next visit

## 2019-01-31 NOTE — PROGRESS NOTES
Chief Complaint   Patient presents with    COPD    Sarcoidosis    Shortness of Breath    Wheezing       1. Have you been to the ER, urgent care clinic since your last visit? Hospitalized since your last visit? No    2. Have you seen or consulted any other health care providers outside of the [address] [place] [address] since your last visit? Include any pap smears or colon screening.  No

## 2019-02-17 ENCOUNTER — APPOINTMENT (OUTPATIENT)
Dept: GENERAL RADIOLOGY | Age: 64
End: 2019-02-17
Attending: EMERGENCY MEDICINE
Payer: MEDICAID

## 2019-02-17 ENCOUNTER — APPOINTMENT (OUTPATIENT)
Dept: GENERAL RADIOLOGY | Age: 64
End: 2019-02-17
Attending: PHYSICIAN ASSISTANT
Payer: MEDICAID

## 2019-02-17 ENCOUNTER — HOSPITAL ENCOUNTER (EMERGENCY)
Age: 64
Discharge: HOME | End: 2019-02-17
Attending: EMERGENCY MEDICINE
Payer: MEDICAID

## 2019-02-17 VITALS
RESPIRATION RATE: 18 BRPM | OXYGEN SATURATION: 98 % | TEMPERATURE: 98.4 F | HEART RATE: 76 BPM | SYSTOLIC BLOOD PRESSURE: 108 MMHG | DIASTOLIC BLOOD PRESSURE: 62 MMHG

## 2019-02-17 DIAGNOSIS — J44.1 COPD WITH ACUTE EXACERBATION (HCC): Primary | ICD-10-CM

## 2019-02-17 DIAGNOSIS — S62.644A CLOSED NONDISPLACED FRACTURE OF PROXIMAL PHALANX OF RIGHT RING FINGER, INITIAL ENCOUNTER: ICD-10-CM

## 2019-02-17 LAB
ALBUMIN SERPL-MCNC: 3.3 G/DL (ref 3.4–5)
ALBUMIN/GLOB SERPL: 0.7 {RATIO} (ref 0.8–1.7)
ALP SERPL-CCNC: 65 U/L (ref 45–117)
ALT SERPL-CCNC: 22 U/L (ref 13–56)
ANION GAP SERPL CALC-SCNC: 6 MMOL/L (ref 3–18)
AST SERPL-CCNC: 32 U/L (ref 15–37)
ATRIAL RATE: 84 BPM
BASOPHILS # BLD: 0 K/UL (ref 0–0.1)
BASOPHILS NFR BLD: 0 % (ref 0–2)
BILIRUB SERPL-MCNC: 0.6 MG/DL (ref 0.2–1)
BUN SERPL-MCNC: 19 MG/DL (ref 7–18)
BUN/CREAT SERPL: 13 (ref 12–20)
CALCIUM SERPL-MCNC: 8.6 MG/DL (ref 8.5–10.1)
CALCULATED P AXIS, ECG09: 70 DEGREES
CALCULATED R AXIS, ECG10: 58 DEGREES
CALCULATED T AXIS, ECG11: 59 DEGREES
CHLORIDE SERPL-SCNC: 97 MMOL/L (ref 100–108)
CK MB CFR SERPL CALC: 1.4 % (ref 0–4)
CK MB SERPL-MCNC: 2.4 NG/ML (ref 5–25)
CK SERPL-CCNC: 173 U/L (ref 26–192)
CO2 SERPL-SCNC: 33 MMOL/L (ref 21–32)
CREAT SERPL-MCNC: 1.44 MG/DL (ref 0.6–1.3)
DIAGNOSIS, 93000: NORMAL
DIFFERENTIAL METHOD BLD: ABNORMAL
EOSINOPHIL # BLD: 0.3 K/UL (ref 0–0.4)
EOSINOPHIL NFR BLD: 4 % (ref 0–5)
ERYTHROCYTE [DISTWIDTH] IN BLOOD BY AUTOMATED COUNT: 16.7 % (ref 11.6–14.5)
GLOBULIN SER CALC-MCNC: 4.5 G/DL (ref 2–4)
GLUCOSE SERPL-MCNC: 111 MG/DL (ref 74–99)
HCT VFR BLD AUTO: 38.6 % (ref 35–45)
HGB BLD-MCNC: 13 G/DL (ref 12–16)
LYMPHOCYTES # BLD: 2.2 K/UL (ref 0.9–3.6)
LYMPHOCYTES NFR BLD: 32 % (ref 21–52)
MCH RBC QN AUTO: 28.8 PG (ref 24–34)
MCHC RBC AUTO-ENTMCNC: 33.7 G/DL (ref 31–37)
MCV RBC AUTO: 85.6 FL (ref 74–97)
MONOCYTES # BLD: 0.6 K/UL (ref 0.05–1.2)
MONOCYTES NFR BLD: 8 % (ref 3–10)
NEUTS SEG # BLD: 4 K/UL (ref 1.8–8)
NEUTS SEG NFR BLD: 56 % (ref 40–73)
P-R INTERVAL, ECG05: 134 MS
PLATELET # BLD AUTO: 131 K/UL (ref 135–420)
PMV BLD AUTO: 10.1 FL (ref 9.2–11.8)
POTASSIUM SERPL-SCNC: 3.9 MMOL/L (ref 3.5–5.5)
PROT SERPL-MCNC: 7.8 G/DL (ref 6.4–8.2)
Q-T INTERVAL, ECG07: 402 MS
QRS DURATION, ECG06: 76 MS
QTC CALCULATION (BEZET), ECG08: 475 MS
RBC # BLD AUTO: 4.51 M/UL (ref 4.2–5.3)
SODIUM SERPL-SCNC: 136 MMOL/L (ref 136–145)
TROPONIN I SERPL-MCNC: <0.02 NG/ML (ref 0–0.04)
VENTRICULAR RATE, ECG03: 84 BPM
WBC # BLD AUTO: 7.1 K/UL (ref 4.6–13.2)

## 2019-02-17 PROCEDURE — 73110 X-RAY EXAM OF WRIST: CPT

## 2019-02-17 PROCEDURE — 77030029684 HC NEB SM VOL KT MONA -A

## 2019-02-17 PROCEDURE — 74011636637 HC RX REV CODE- 636/637: Performed by: EMERGENCY MEDICINE

## 2019-02-17 PROCEDURE — 96374 THER/PROPH/DIAG INJ IV PUSH: CPT

## 2019-02-17 PROCEDURE — 73130 X-RAY EXAM OF HAND: CPT

## 2019-02-17 PROCEDURE — 71046 X-RAY EXAM CHEST 2 VIEWS: CPT

## 2019-02-17 PROCEDURE — 74011250636 HC RX REV CODE- 250/636: Performed by: EMERGENCY MEDICINE

## 2019-02-17 PROCEDURE — 74011000250 HC RX REV CODE- 250: Performed by: EMERGENCY MEDICINE

## 2019-02-17 PROCEDURE — 85025 COMPLETE CBC W/AUTO DIFF WBC: CPT

## 2019-02-17 PROCEDURE — 74011250636 HC RX REV CODE- 250/636

## 2019-02-17 PROCEDURE — 93005 ELECTROCARDIOGRAM TRACING: CPT

## 2019-02-17 PROCEDURE — 77030008323 HC SPLNT FNGR GTR DJOR -A

## 2019-02-17 PROCEDURE — 80053 COMPREHEN METABOLIC PANEL: CPT

## 2019-02-17 PROCEDURE — 99284 EMERGENCY DEPT VISIT MOD MDM: CPT

## 2019-02-17 PROCEDURE — 94640 AIRWAY INHALATION TREATMENT: CPT

## 2019-02-17 PROCEDURE — 82550 ASSAY OF CK (CPK): CPT

## 2019-02-17 PROCEDURE — 74011250637 HC RX REV CODE- 250/637: Performed by: EMERGENCY MEDICINE

## 2019-02-17 RX ORDER — LIDOCAINE 4 G/100G
1 PATCH TOPICAL
Status: DISCONTINUED | OUTPATIENT
Start: 2019-02-17 | End: 2019-02-17 | Stop reason: HOSPADM

## 2019-02-17 RX ORDER — HYDROCODONE BITARTRATE AND ACETAMINOPHEN 5; 325 MG/1; MG/1
1 TABLET ORAL
Qty: 12 TAB | Refills: 0 | Status: SHIPPED | OUTPATIENT
Start: 2019-02-17 | End: 2019-07-10

## 2019-02-17 RX ORDER — ONDANSETRON 2 MG/ML
INJECTION INTRAMUSCULAR; INTRAVENOUS
Status: COMPLETED
Start: 2019-02-17 | End: 2019-02-17

## 2019-02-17 RX ORDER — ACETAMINOPHEN 325 MG/1
650 TABLET ORAL ONCE
Status: COMPLETED | OUTPATIENT
Start: 2019-02-17 | End: 2019-02-17

## 2019-02-17 RX ORDER — IPRATROPIUM BROMIDE AND ALBUTEROL SULFATE 2.5; .5 MG/3ML; MG/3ML
3 SOLUTION RESPIRATORY (INHALATION)
Status: COMPLETED | OUTPATIENT
Start: 2019-02-17 | End: 2019-02-17

## 2019-02-17 RX ORDER — PREDNISONE 50 MG/1
50 TABLET ORAL DAILY
Qty: 3 TAB | Refills: 0 | Status: SHIPPED | OUTPATIENT
Start: 2019-02-17 | End: 2019-03-22 | Stop reason: ALTCHOICE

## 2019-02-17 RX ORDER — HYDROCODONE BITARTRATE AND ACETAMINOPHEN 5; 325 MG/1; MG/1
1 TABLET ORAL
Status: COMPLETED | OUTPATIENT
Start: 2019-02-17 | End: 2019-02-17

## 2019-02-17 RX ORDER — MORPHINE SULFATE 4 MG/ML
4 INJECTION INTRAVENOUS
Status: COMPLETED | OUTPATIENT
Start: 2019-02-17 | End: 2019-02-17

## 2019-02-17 RX ADMIN — ACETAMINOPHEN 650 MG: 325 TABLET ORAL at 14:17

## 2019-02-17 RX ADMIN — IPRATROPIUM BROMIDE AND ALBUTEROL SULFATE 3 ML: .5; 3 SOLUTION RESPIRATORY (INHALATION) at 14:59

## 2019-02-17 RX ADMIN — PREDNISONE 50 MG: 20 TABLET ORAL at 14:58

## 2019-02-17 RX ADMIN — ONDANSETRON 4 MG: 2 INJECTION INTRAMUSCULAR; INTRAVENOUS at 17:30

## 2019-02-17 RX ADMIN — HYDROCODONE BITARTRATE AND ACETAMINOPHEN 1 TABLET: 5; 325 TABLET ORAL at 16:52

## 2019-02-17 RX ADMIN — MORPHINE SULFATE 4 MG: 4 INJECTION INTRAVENOUS at 17:30

## 2019-02-17 NOTE — DISCHARGE INSTRUCTIONS
Your evaluation today showed finger fracture. Please follow up with Dr. Ruben León as discussed. The evaluation and treatment done today requires that you follow up with a physician for re-evaluation. Not following up could result in chronic pain/disability/injury. Medical problems can change over time and symptoms can get worse or new symptoms can develop over time, therefore, it is important that you follow up as we discussed or return immedediately to the ER. Diseases don't read textbooks and there are limitations to our evaluation and testing, so please immediately return to the ER if you have any concerns. Call the ER if you have any questions about what we discussed. Please visit [facility] for discounts on any prescriptions you may have. At the [facility] Emergency Department we are genuinely concerned about your health and comfort. You may be selected to participate in a patient satisfaction survey mailed to your home. We are excited about the opportunity to learn from your experience so we may continue to improve. In striving for the very best we believe good is not good enough, but if you rate us as EXCELLENT in all boxes, we have succeeded. We strive to provide EXCELLENT care to you and your family. We appreciate the opportunity to take care of you, and hope you do well. Finger Fracture: Care Instructions  Your Care Instructions    Breaks in the bones of the finger usually heal well in about 3 to 4 weeks. The pain and swelling from a broken finger can last for weeks. But it should steadily improve, starting a few days after you break it. It is very important that you wear and take care of the cast or splint exactly as your doctor tells you to so that your finger heals properly and does not end up crooked. Wearing a splint may interfere with your normal activities. Ask for help with daily tasks if you need it. You heal best when you take good care of yourself.  Eat a variety of healthy foods, and don't smoke. Follow-up care is a key part of your treatment and safety. Be sure to make and go to all appointments, and call your doctor if you are having problems. It's also a good idea to know your test results and keep a list of the medicines you take. How can you care for yourself at home? · If your doctor put a splint on your finger, wear the splint exactly as directed. Do not remove it until your doctor says that you can. · Keep your hand raised above the level of your heart as much as you can. This will help reduce swelling. · Put ice or a cold pack on your finger for 10 to 20 minutes at a time. Try to do this every 1 to 2 hours for the next 3 days (when you are awake) or until the swelling goes down. Put a thin cloth between the ice and your skin. Keep the splint dry. · Be safe with medicines. Take pain medicines exactly as directed. ? If the doctor gave you a prescription medicine for pain, take it as prescribed. ? If you are not taking a prescription pain medicine, ask your doctor if you can take an over-the-counter medicine. When should you call for help? Call 911 anytime you think you may need emergency care. For example, call if:    · Your finger is cool or pale or changes color.    Call your doctor now or seek immediate medical care if:    · Your pain gets much worse.     · You have tingling, weakness, or numbness in your finger.     · You have signs of infection, such as:  ? Increased pain, swelling, warmth, or redness. ? Red streaks leading from the area. ? Pus draining from the area. ? Swollen lymph nodes in your neck, armpits, or groin. ? A fever.    Watch closely for changes in your health, and be sure to contact your doctor if:    · Your finger is not steadily improving. Where can you learn more? Go to http://rick-kathya.info/. Enter H870 in the search box to learn more about \"Finger Fracture: Care Instructions. \"  Current as of: September 20, 2018  Content Version: 11.9  © 8344-7459 [employer]. Care instructions adapted under license by [employer] (which disclaims liability or warranty for this information). If you have questions about a medical condition or this instruction, always ask your healthcare professional. [address] any warranty or liability for your use of this information. Chronic Obstructive Pulmonary Disease (COPD) Flare-Ups: Care Instructions  Your Care Instructions    Chronic obstructive pulmonary disease (COPD) is a lung disease that makes it hard to breathe. It is caused by damage to the lungs over many years, usually from smoking. COPD is often a mix of two diseases:  · Chronic bronchitis: The airways that carry air to the lungs (bronchial tubes) get inflamed and make a lot of mucus. This can narrow or block the airways. · Emphysema: In a healthy person, the tiny air sacs in the lungs are like balloons. As you breathe in and out, they get bigger and smaller to move air through your lungs. But with emphysema, these air sacs are damaged and lose their stretch. Less air gets in and out of the lungs. Many people with COPD have attacks called flare-ups or exacerbations. This is when your usual symptoms quickly get worse and stay worse. The doctor has checked you carefully. But problems can develop later. If you notice any problems or new symptoms, get medical treatment right away. Follow-up care is a key part of your treatment and safety. Be sure to make and go to all appointments, and call your doctor if you are having problems. It's also a good idea to know your test results and keep a list of the medicines you take. How can you care for yourself at home? · Be safe with medicines. Take your medicines exactly as prescribed. Call your doctor if you think you are having a problem with your medicine. You may be taking medicines such as:  ? Bronchodilators.  These help open your airways and make breathing easier. ? Corticosteroids. These reduce airway inflammation. They may be given as pills, in a vein, or in an inhaled form. You may go home with pills in addition to an inhaler that you already use. · A spacer may help you get more inhaled medicine to your lungs. Ask your doctor or pharmacist if a spacer is right for you. If it is, ask how to use it properly. · If your doctor prescribed antibiotics, take them as directed. Do not stop taking them just because you feel better. You need to take the full course of antibiotics. · If your doctor prescribed oxygen, use the flow rate your doctor has recommended. Do not change it without talking to your doctor first.  · Do not smoke. Smoking makes COPD worse. If you need help quitting, talk to your doctor about stop-smoking programs and medicines. These can increase your chances of quitting for good. When should you call for help? Call 911 anytime you think you may need emergency care. For example, call if:    · You have severe trouble breathing.    Call your doctor now or seek immediate medical care if:    · You have new or worse trouble breathing.     · Your coughing or wheezing gets worse.     · You cough up dark brown or bloody mucus (sputum).     · You have a new or higher fever.    Watch closely for changes in your health, and be sure to contact your doctor if:    · You notice more mucus or a change in the color of your mucus.     · You need to use your antibiotic or steroid pills.     · You do not get better as expected. Where can you learn more? Go to http://rick-kathya.info/. Enter L980 in the search box to learn more about \"Chronic Obstructive Pulmonary Disease (COPD) Flare-Ups: Care Instructions. \"  Current as of: September 5, 2018  Content Version: 11.9  © 9544-3618 [employer].  Care instructions adapted under license by [employer] (which disclaims liability or warranty for this information). If you have questions about a medical condition or this instruction, always ask your healthcare professional. [place] [address] any warranty or liability for your use of this information.

## 2019-02-17 NOTE — ED TRIAGE NOTES
Pt arrived c/o right hand pain r/t a fall at home, states she lost balance in bathroom and attempted to break fall with hand, also has burn to R hand from frying chicken on Thursday, has COPD, on 2L nc continuous, 100% 2L NC

## 2019-02-17 NOTE — ED PROVIDER NOTES
EMERGENCY DEPARTMENT HISTORY AND PHYSICAL EXAM 
 
2:05 PM 
Date: 2/17/2019 Patient Name: Ca Monk History of Presenting Illness Chief Complaint:  
Chief Complaint Patient presents with  
 Hand Pain  Shortness of Breath History Provided By: Patient Additional History (Context): Ca Monk is a 59 y.o. female with a PMHx of  who presents with acute hand pain onset x 2 days ago. Associated Sx include swelling of the right hand which began yesterday. The pain comes initially from a mechanical fall where there was no chest pain or dizziness prior to the fall. Denies shoulder pain, elbow pain, and wrist pain. Pt also complains of being SOB with a cough and congestion consistent with her usual COPD. This has been discussed with her pulmonologist, Dr. Lavell Serrano, and was prescribed Prednisone which she has completed. She currently uses 2L of oxygen at home via NC. No further concerns or complaints at this time. PCP: Gm Taylor MD 
 
Duration: 2 Days Timing:  Acute Location: Right hand Quality: Aching Severity: Not reported Modifying Factors: N/A Associated Symptoms: Swelling Past History Past Medical History: 
Past Medical History:  
Diagnosis Date  Abnormally low high density lipoprotein (HDL) cholesterol with hypertriglyceridemia Lipid profile (11/6/2016) showed , , HDL 38, LDL 37  
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 congestive heart failure ([address] Better controlled  Bipolar affective disorder ([address] 12/5/2012  Cellulitis of right forearm 5/4/2017  Chronic back pain  Chronic lung disease  Chronic obstructive pulmonary disease (COPD) ([address] SOB, on paula O2 Recent admission with psychosis  CKD stage G3a/A1, GFR 45-59 and albumin creatinine ratio <30 mg/g ([facility]) 5/11/2017 Urine microalbumin/Creatinine ratio (5/11/2017) = 9 mg/g  Contusion of left elbow 2017  Depression  Diabetes mellitus ([facility]  Diabetic neuropathy associated with type 2 diabetes mellitus ([facility]  Diastolic dysfunction without heart failure Stable on diuretics  Falls frequently  Gastroesophageal reflux disease with hiatal hernia  Generalized osteoarthritis of multiple sites  Gout  History of acute renal failure 2013  History of back injury   
 jumped out of second story window  History of hepatitis C   
 treated  History of penicillin allergy  History of vitamin D deficiency 5/10/2017  Hyperuricemia 2017  Hypothyroidism  Hypoxemia requiring supplemental oxygen 2014  Intravenous drug user 2017  Memory difficulty  Mixed connective tissue disease ([facility] 5/10/2017  Obesity, Class I, BMI 30-34.9  Olecranon bursitis of right elbow 2017  Recurrent genital herpes 2013  Right Achilles tendinitis 2017  Sarcoidosis  Sickle cell trait ([facility]  Type 2 diabetes with stage 3 chronic kidney disease GFR 30-59 ([facility])  Urge urinary incontinence 5/10/2017  Venous insufficiency  Wears glasses Past Surgical History: 
Past Surgical History:  
Procedure Laterality Date [facility]  HX  SECTION    
 HX CYST REMOVAL Left  Left foot  HX OTHER SURGICAL Left 2017 S/P incision and drainage of the abscess of the left hand and the left foot (2017 - Dr. Jamaica Corcoran. Brian Coker)  HX TUBAL LIGATION Family History: 
Family History Problem Relation Age of Onset  Seizures Other  Diabetes Mother  Hypertension Mother  Heart Disease Mother  Cancer Mother  Diabetes Father  Stroke Father  Heart Disease Father  Headache Sister  Depression Neg Hx  Suicide Neg Hx  Psychotic Disorder Neg Hx  Substance Abuse Neg Hx  Dementia Neg Hx Social History: Social History Tobacco Use  Smoking status: Current Every Day Smoker Packs/day: 0.50 Years: 30.00 Pack years: 15.00 Types: Cigarettes Start date: 12/2/1969  Smokeless tobacco: Never Used Substance Use Topics  Alcohol use: No  
 Drug use: No  
  Comment: +Cocaine Screen 2013 Allergies: Allergies Allergen Reactions  Moxifloxacin Rash  Pcn [Penicillins] Swelling  Sulfa (Sulfonamide Antibiotics) Swelling Review of Systems Review of Systems HENT: Positive for congestion. Respiratory: Positive for cough and shortness of breath. Musculoskeletal: Positive for joint swelling (right hand) and myalgias (right hand). All other systems reviewed and are negative. Physical Exam  
 
Patient Vitals for the past 12 hrs: 
 Temp Pulse Resp BP SpO2  
02/17/19 1252 98.5 °F (36.9 °C) 86 16 94/63 100 % Physical Exam  
Constitutional: She is oriented to person, place, and time. She appears well-developed. HENT:  
Head: Normocephalic and atraumatic. Eyes: Conjunctivae and EOM are normal.  
Neck: Normal range of motion. Cardiovascular: Normal heart sounds. Exam reveals no gallop and no friction rub. No murmur heard. Pulmonary/Chest: Effort normal. No stridor. She has wheezes (scattered). Abdominal: Soft. There is no tenderness. Musculoskeletal: Normal range of motion. She exhibits no tenderness. Right wrist: She exhibits no tenderness. Right hand: She exhibits swelling (palm and digits). Distal radial and ulnar nerves are intact. Tenderness to the 3rd and 4th metacarpals. No snuff box tenderness. No tenderness with axial loading of the thumb. Diffuse tenderness from the 3rd to 5th digit. Neurological: She is alert and oriented to person, place, and time. Skin: Skin is warm and dry. She is not diaphoretic. Psychiatric: She has a normal mood and affect. Her behavior is normal.  
Nursing note and vitals reviewed. Diagnostic Study Results Labs - Recent Results (from the past 12 hour(s)) EKG, 12 LEAD, INITIAL Collection Time: 02/17/19 12:57 PM  
Result Value Ref Range Ventricular Rate 84 BPM  
 Atrial Rate 84 BPM  
 P-R Interval 134 ms QRS Duration 76 ms  
 Q-T Interval 402 ms QTC Calculation (Bezet) 475 ms Calculated P Axis 70 degrees Calculated R Axis 58 degrees Calculated T Axis 59 degrees Diagnosis Normal sinus rhythm Normal ECG When compared with ECG of 08-NOV-2016 06:37, 
QT has lengthened Confirmed by Roxana Sheppard MD, Ragini Holden ([id number]) on 2/17/2019 3:18:32 PM 
  
CBC WITH AUTOMATED DIFF Collection Time: 02/17/19  1:30 PM  
Result Value Ref Range WBC 7.1 4.6 - 13.2 K/uL  
 RBC 4.51 4.20 - 5.30 M/uL  
 HGB 13.0 12.0 - 16.0 g/dL HCT 38.6 35.0 - 45.0 % MCV 85.6 74.0 - 97.0 FL  
 MCH 28.8 24.0 - 34.0 PG  
 MCHC 33.7 31.0 - 37.0 g/dL  
 RDW 16.7 (H) 11.6 - 14.5 % PLATELET 394 (L) 371 - 420 K/uL MPV 10.1 9.2 - 11.8 FL  
 NEUTROPHILS 56 40 - 73 % LYMPHOCYTES 32 21 - 52 % MONOCYTES 8 3 - 10 % EOSINOPHILS 4 0 - 5 % BASOPHILS 0 0 - 2 %  
 ABS. NEUTROPHILS 4.0 1.8 - 8.0 K/UL  
 ABS. LYMPHOCYTES 2.2 0.9 - 3.6 K/UL  
 ABS. MONOCYTES 0.6 0.05 - 1.2 K/UL  
 ABS. EOSINOPHILS 0.3 0.0 - 0.4 K/UL  
 ABS. BASOPHILS 0.0 0.0 - 0.1 K/UL  
 DF AUTOMATED METABOLIC PANEL, COMPREHENSIVE Collection Time: 02/17/19  1:30 PM  
Result Value Ref Range Sodium 136 136 - 145 mmol/L Potassium 3.9 3.5 - 5.5 mmol/L Chloride 97 (L) 100 - 108 mmol/L  
 CO2 33 (H) 21 - 32 mmol/L Anion gap 6 3.0 - 18 mmol/L Glucose 111 (H) 74 - 99 mg/dL BUN 19 (H) 7.0 - 18 MG/DL Creatinine 1.44 (H) 0.6 - 1.3 MG/DL  
 BUN/Creatinine ratio 13 12 - 20 GFR est AA 45 (L) >60 ml/min/1.73m2 GFR est non-AA 37 (L) >60 ml/min/1.73m2 Calcium 8.6 8.5 - 10.1 MG/DL  Bilirubin, total 0.6 0.2 - 1.0 MG/DL  
 ALT (SGPT) 22 13 - 56 U/L  
 AST (SGOT) 32 15 - 37 U/L  
 Alk. phosphatase 65 45 - 117 U/L Protein, total 7.8 6.4 - 8.2 g/dL Albumin 3.3 (L) 3.4 - 5.0 g/dL Globulin 4.5 (H) 2.0 - 4.0 g/dL A-G Ratio 0.7 (L) 0.8 - 1.7 CARDIAC PANEL,(CK, CKMB & TROPONIN) Collection Time: 02/17/19  1:30 PM  
Result Value Ref Range  26 - 192 U/L  
 CK - MB 2.4 <3.6 ng/ml CK-MB Index 1.4 0.0 - 4.0 % Troponin-I, QT <0.02 0.0 - 0.045 NG/ML Radiologic Studies -  
XR HAND RT MIN 3 V Final Result IMPRESSION:  
  
Right wrist:  
No acute osseous findings at the wrist.  
  
Right hand:  
Ill-defined fracture at the fourth finger base of proximal phalanx. Dorsal hand  
soft tissue swelling. XR WRIST RT AP/LAT/OBL MIN 3V Final Result IMPRESSION:  
  
Right wrist:  
No acute osseous findings at the wrist.  
  
Right hand:  
Ill-defined fracture at the fourth finger base of proximal phalanx. Dorsal hand  
soft tissue swelling. XR CHEST PA LAT Final Result IMPRESSION:  
  
No evidence of acute pulmonary disease or significant interval change. Advanced  
pulmonary emphysematous disease/COPD and fibrotic changes. Medical Decision Making ED Course: Progress Notes, Reevaluation, and Consults: 
3:42 PM I have assessed the patient who will be discharged in stable condition. Patient is to return to emergency department if any new or worsening condition. I have given the patient instructions for discharge and follow-up. Patient understands and verbalizes agreement with plan, diagnosis, discharge, and follow-up. Provider Notes (Medical Decision Making): After history and physical exam there was some suspicion for fracture so an x-ray was obtained which revealed a right finger fracture. Pt appears otherwise well without obvious other injury.  She has some moderate swelling but currently low suspicion for compartment syndrome or nec fasc based on her current exam. Pt was given morphine and norco in the emergency department and splinted in a position of comfort. The splint was checked by me. The pt was advised if they have any coolness in the digits, numbness, or persistent pain in the splinted extremity; the bandage should be loosened and they should contact their primary care provider. If the pt is unable to contact the primary care provider, they should return to the emergency department for repeat exam of the splint immediately. Pt needs to follow-up as instructed for definitive care. Patient was given a referral to hand surgery and discharged with analgesia Patient also presents with SOB consistent with their prior COPD. The lack of CP, leg pain/swelling, and fever makes an alternative such as DVT/PE, ACS, CHF, PNA, PTX less likely. Their symptoms improved after nebulized bronchodilators and steroids, and they are at their baseline oxygenation and respiratory effort. On re-eval, their lung exam has improved and they were able to ambulate well around the department without assistance or SOB. They will be discharged for a trial of outpatient management with inhalers, steroids, and close PMD follow up. The pt had all questions answered and knew return precautions. Splint, Finger 
Date/Time: 2/17/2019 7:25 PM 
Performed by: Tae Donald MD 
Authorized by: Tae Donald MD  
 
Consent:  
  Consent obtained:  Verbal 
  Consent given by:  Patient Risks discussed:  Swelling, pain, numbness and discoloration Alternatives discussed:  No treatment and delayed treatment Pre-procedure details:  
  Sensation:  Normal 
Procedure details:  
  Laterality:  Right Location:  Finger Finger:  R ring finger Strapping: no   
  Splint type:  Finger Supplies:  Aluminum splint Post-procedure details:  
  Pain:  Improved Sensation:  Normal 
  Patient tolerance of procedure: Tolerated well, no immediate complications Current Facility-Administered Medications Medication Dose Route Frequency Provider Last Rate Last Dose  lidocaine 4 % patch 1 Patch  1 Patch TransDERmal NOW Maria Del Carmen Messer MD   1 Patch at 02/17/19 1510 Current Outpatient Medications Medication Sig Dispense Refill  predniSONE (DELTASONE) 50 mg tablet Take 1 Tab by mouth daily. 3 Tab 0  
 lidocaine HCl-me-salicyl-menth 7-28-73 % ktcg 1 Patch by Apply Externally route daily as needed. 1 Box 0  
 HYDROcodone-acetaminophen (NORCO) 5-325 mg per tablet Take 1 Tab by mouth every four (4) hours as needed for Pain. Max Daily Amount: 6 Tabs. 12 Tab 0  
 albuterol (PROAIR HFA) 90 mcg/actuation inhaler INHALE 2 PUFFS EVERY 4 HOURS AS NEEDED FOR WHEEZING 1 Inhaler 5  
 albuterol (PROVENTIL VENTOLIN) 2.5 mg /3 mL (0.083 %) nebulizer solution USE 1 NEB EVERY 4 HOURS FOR WHEEZING AND SHORTNESS OF BREATH 2 Package 3  
 umeclidinium-vilanterol (ANORO ELLIPTA) 62.5-25 mcg/actuation inhaler INHALE 1 PUFF DAILY 1 Inhaler 5  
 umeclidinium-vilanterol (ANORO ELLIPTA) 62.5-25 mcg/actuation inhaler Take 1 Puff by inhalation daily. 1 Inhaler 0  
 azithromycin (ZITHROMAX) 250 mg tablet Take 2 tabs by mouth on day one then 1 tab by mouth daily 6 Tab 0  
 rosuvastatin (CRESTOR) 5 mg tablet TAKE ONE TABLET NIGHTLY 90 Tab 3  
 oxybutynin (DITROPAN) 5 mg tablet Take 5 mg by mouth two (2) times a day.  metOLazone (ZAROXOLYN) 2.5 mg tablet Take 2.5 mg by mouth every fourty-eight (48) hours.  allopurinol (ZYLOPRIM) 100 mg tablet Take  by mouth daily.  famotidine (PEPCID) 20 mg tablet Take 1 Tab by mouth daily. Indications: PREVENTION OF STRESS ULCER 15 Tab 0  
 insulin glargine (LANTUS) 100 unit/mL injection Inject 70 units subcutaneously before breakfast (7AM) and 40 units subcutaneously after dinner (7PM).   Indications: type 2 diabetes mellitus 1 Vial 0  
 insulin lispro (HUMALOG) 100 unit/mL injection To be given 15 min before meals: < 150 - None, 151-200 - 2 u, 201-250 - 4 u, 251-300 - 6 u, 301-350 - 8 u, 351-400 - 10 u, >400 - 12 u 1 Vial 0  
 aspirin 81 mg chewable tablet Take 1 Tab by mouth daily (with breakfast). Indications: prevention of cerebrovascular accident 13 Tab 0  
 levothyroxine (SYNTHROID) 100 mcg tablet Take 1 Tab by mouth Daily (before breakfast). Indications: hypothyroidism 15 Tab 0  cholecalciferol (VITAMIN D3) 1,000 unit tablet Take 2 Tabs by mouth daily. Indications: PREVENTION OF VITAMIN D DEFICIENCY 30 Tab 0  
 divalproex DR (DEPAKOTE) 250 mg tablet Take 250 mg by mouth nightly. Indications: BIPOLAR DISORDER IN REMISSION  insulin syringe,safetyneedle 1 mL 30 gauge x 5/16\" syrg As directed 50 Each 0  
 ARIPiprazole (ABILIFY) 5 mg tablet Take 10 mg by mouth daily. Indications: BIPOLAR DISORDER IN REMISSION    
 gabapentin (NEURONTIN) 300 mg capsule Take 300 mg by mouth daily. Indications: NEUROPATHIC PAIN    
 traZODone (DESYREL) 100 mg tablet Take 100 mg by mouth as needed. Indications: major depressive disorder  Nebulizer Accessories Kit Use with nebulizer machine 1 Kit prn  sertraline (ZOLOFT) 100 mg tablet Take 50 mg by mouth daily. Indications: DEPRESSION ASSOCIATED WITH BIPOLAR DISORDER    
 Oxygen-Air Delivery Systems by Nasal route continuous. 2 LPM via NC  Indications: Hypoxemia requiring supplementary oxygen Vital Signs-Reviewed the patient's vital signs. Pulse Oximetry Analysis -  100% on room air, normal. 
 
Records Reviewed: Nursing Notes (Time of Review: 2:05 PM) 
-I am the first provider for this patient. 
-I reviewed the vital signs, available nursing notes, past medical history, past surgical history, family history and social history. Diagnosis Clinical Impression: 1. COPD with acute exacerbation ([address] 2. Closed nondisplaced fracture of proximal phalanx of right ring finger, initial encounter Disposition: Discharged Follow-up Information Follow up With Specialties Details Why Contact Info [facility] EMERGENCY DEPT Emergency Medicine  As needed, If symptoms worsen [address] [place] [id number] 
[phone] Carolina Coley MD Internal Medicine Go to 1-2 days for follow up appointment [address] [facility] [id number] [phone] Milton RODRIGUEZ,  Hand Surgery Go to as soon as possible for hand follow up [address] [facility] [address] [facility] 
[phone] Jarvis Trinidad MD Pulmonary Disease, Urgent Care, Internal Medicine Schedule an appointment as soon as possible for a visit in 2 days For COPD follow up [address] [facility] [id number] [phone] Medication List  
  
START taking these medications HYDROcodone-acetaminophen 5-325 mg per tablet Commonly known as:  [place] Take 1 Tab by mouth every four (4) hours as needed for Pain. Max Daily Amount: 6 Tabs. 
  
lidocaine HCl-me-salicyl-menth 1-23-78 % Ktcg 1 Patch by Apply Externally route daily as needed. CHANGE how you take these medications   
predniSONE 50 mg tablet Commonly known as:  Leonard Morgan Take 1 Tab by mouth daily. What changed:   
· medication strength · how much to take · how to take this · when to take this 
· additional instructions STOP taking these medications   
oxyCODONE-acetaminophen 5-325 mg per tablet Commonly known as:  PERCOCET 
  
traMADol 50 mg tablet Commonly known as:  ULTRAM 
  
TYLENOL 325 mg tablet Generic drug:  acetaminophen ASK your doctor about these medications ABILIFY 5 mg tablet Generic drug:  ARIPiprazole * albuterol 90 mcg/actuation inhaler Commonly known as:  PROAIR HFA INHALE 2 PUFFS EVERY 4 HOURS AS NEEDED FOR WHEEZING 
  
* albuterol 2.5 mg /3 mL (0.083 %) nebulizer solution Commonly known as:  PROVENTIL VENTOLIN 
USE 1 NEB EVERY 4 HOURS FOR WHEEZING AND SHORTNESS OF BREATH 
  
allopurinol 100 mg tablet Commonly known as:  Ashley Fletcher 
  
 aspirin 81 mg chewable tablet Take 1 Tab by mouth daily (with breakfast). Indications: prevention of cerebrovascular accident 
  
azithromycin 250 mg tablet Commonly known as:  Lance Hire Take 2 tabs by mouth on day one then 1 tab by mouth daily 
  
cholecalciferol 1,000 unit tablet Commonly known as:  VITAMIN D3 Take 2 Tabs by mouth daily. Indications: PREVENTION OF VITAMIN D DEFICIENCY 
  
DEPAKOTE 250 mg tablet Generic drug:  divalproex DR 
  
famotidine 20 mg tablet Commonly known as:  PEPCID Take 1 Tab by mouth daily. Indications: PREVENTION OF STRESS ULCER 
  
insulin glargine 100 unit/mL injection Commonly known as:  LANTUS Inject 70 units subcutaneously before breakfast (7AM) and 40 units subcutaneously after dinner (7PM). Indications: type 2 diabetes mellitus 
  
insulin lispro 100 unit/mL injection Commonly known as:  HUMALOG To be given 15 min before meals: < 150 - None, 151-200 - 2 u, 201-250 - 4 u, 251-300 - 6 u, 301-350 - 8 u, 351-400 - 10 u, >400 - 12 u 
  
insulin syringe,safetyneedle 1 mL 30 gauge x 5/16\" Syrg As directed 
  
levothyroxine 100 mcg tablet Commonly known as:  SYNTHROID Take 1 Tab by mouth Daily (before breakfast). Indications: hypothyroidism 
  
metOLazone 2.5 mg tablet Commonly known as:  Abbie Naylor Nebulizer Accessories Kit Use with nebulizer machine NEURONTIN 300 mg capsule Generic drug:  gabapentin 
  
oxybutynin 5 mg tablet Commonly known as:  [id number] Oxygen-Air Delivery Systems 
  
rosuvastatin 5 mg tablet Commonly known as:  CRESTOR 
TAKE ONE TABLET NIGHTLY 
  
traZODone 100 mg tablet Commonly known as:  DESYREL 
  
* umeclidinium-vilanterol 62.5-25 mcg/actuation inhaler Commonly known as:  [place] INHALE 1 PUFF DAILY 
  
* umeclidinium-vilanterol 62.5-25 mcg/actuation inhaler Commonly known as:  [place] Take 1 Puff by inhalation daily. ZOLOFT 100 mg tablet Generic drug:  sertraline * This list has 4 medication(s) that are the same as other medications prescribed for you. Read the directions carefully, and ask your doctor or other care provider to review them with you. Where to Get Your Medications Information about where to get these medications is not yet available Ask your nurse or doctor about these medications · HYDROcodone-acetaminophen 5-325 mg per tablet · lidocaine HCl-me-salicyl-menth 9-74-37 % Ktcg · predniSONE 50 mg tablet 
  
 
_______________________________ Attestations: 
Scribe Attestation Shailesh Gamble acting as a scribe for and in the presence of Susana Trujillo MD     
February 17, 2019 at 2:05 PM 
    
Provider Attestation:     
I personally performed the services described in the documentation, reviewed the documentation, as recorded by the scribe in my presence, and it accurately and completely records my words and actions. February 17, 2019 at 2:05 PM - Susana Trujillo MD   
_______________________________

## 2019-02-20 ENCOUNTER — OFFICE VISIT (OUTPATIENT)
Dept: ORTHOPEDIC SURGERY | Facility: CLINIC | Age: 64
End: 2019-02-20

## 2019-02-20 VITALS
BODY MASS INDEX: 26.16 KG/M2 | HEIGHT: 65 IN | WEIGHT: 157 LBS | OXYGEN SATURATION: 97 % | SYSTOLIC BLOOD PRESSURE: 133 MMHG | DIASTOLIC BLOOD PRESSURE: 78 MMHG | RESPIRATION RATE: 18 BRPM | TEMPERATURE: 97.1 F | HEART RATE: 86 BPM

## 2019-02-20 DIAGNOSIS — S62.644A CLOSED NONDISPLACED FRACTURE OF PROXIMAL PHALANX OF RIGHT RING FINGER, INITIAL ENCOUNTER: Primary | ICD-10-CM

## 2019-02-20 RX ORDER — COLCHICINE 0.6 MG/1
CAPSULE ORAL
COMMUNITY
Start: 2018-11-14 | End: 2019-01-01

## 2019-02-20 NOTE — PATIENT INSTRUCTIONS
Finger Fracture: Care Instructions  Your Care Instructions    Breaks in the bones of the finger usually heal well in about 3 to 4 weeks. The pain and swelling from a broken finger can last for weeks. But it should steadily improve, starting a few days after you break it. It is very important that you wear and take care of the cast or splint exactly as your doctor tells you to so that your finger heals properly and does not end up crooked. Wearing a splint may interfere with your normal activities. Ask for help with daily tasks if you need it. You heal best when you take good care of yourself. Eat a variety of healthy foods, and don't smoke. Follow-up care is a key part of your treatment and safety. Be sure to make and go to all appointments, and call your doctor if you are having problems. It's also a good idea to know your test results and keep a list of the medicines you take. How can you care for yourself at home? · If your doctor put a splint on your finger, wear the splint exactly as directed. Do not remove it until your doctor says that you can. · Keep your hand raised above the level of your heart as much as you can. This will help reduce swelling. · Put ice or a cold pack on your finger for 10 to 20 minutes at a time. Try to do this every 1 to 2 hours for the next 3 days (when you are awake) or until the swelling goes down. Put a thin cloth between the ice and your skin. Keep the splint dry. · Be safe with medicines. Take pain medicines exactly as directed. ? If the doctor gave you a prescription medicine for pain, take it as prescribed. ? If you are not taking a prescription pain medicine, ask your doctor if you can take an over-the-counter medicine. When should you call for help? Call 911 anytime you think you may need emergency care.  For example, call if:    · Your finger is cool or pale or changes color.    Call your doctor now or seek immediate medical care if:    · Your pain gets much worse.     · You have tingling, weakness, or numbness in your finger.     · You have signs of infection, such as:  ? Increased pain, swelling, warmth, or redness. ? Red streaks leading from the area. ? Pus draining from the area. ? Swollen lymph nodes in your neck, armpits, or groin. ? A fever.    Watch closely for changes in your health, and be sure to contact your doctor if:    · Your finger is not steadily improving. Where can you learn more? Go to http://rick-kathya.info/. Enter J353 in the search box to learn more about \"Finger Fracture: Care Instructions. \"  Current as of: September 20, 2018  Content Version: 11.9  © 6981-0387 [employer]. Care instructions adapted under license by [employer] (which disclaims liability or warranty for this information). If you have questions about a medical condition or this instruction, always ask your healthcare professional. [place] [address] any warranty or liability for your use of this information.

## 2019-02-20 NOTE — PROGRESS NOTES
Naty Webb is a 59 y.o. female right handed retiree. Worker's Compensation and legal considerations: not known. Vitals:    02/20/19 1304   BP: 133/78   Pulse: 86   Resp: 18   Temp: 97.1 °F (36.2 °C)   TempSrc: Oral   SpO2: 97%   Weight: 157 lb (71.2 kg)   Height: 5' 5\" (1.651 m)   PainSc:   7   PainLoc: Hand   LMP: 11/25/2012           Chief Complaint   Patient presents with    Hand Pain     Right 4th finger         HPI: Patient comes in today after suffering a fall on Friday, February 15. She went to the emergency department on February 17 and had x-rays done and was diagnosed with a ring finger fracture. She reports the swelling has markedly improved since that visit. She denies any new falls or new injuries since the original.  She has been in a splint since that time. She is also reporting swelling in her middle finger since the injury.       Date of onset: 2/15/2019    Injury: Yes: Comment: Fall    Prior Treatment:  Yes: Comment: Aluminum splint in emergency department    Numbness/ Tingling: No    ROS: Review of Systems - General ROS: negative  Respiratory ROS: no cough, shortness of breath, or wheezing  Cardiovascular ROS: no chest pain or dyspnea on exertion  Musculoskeletal ROS: positive for - pain in finger - right and swelling in finger - right  Neurological ROS: negative  Dermatological ROS: negative    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 congestive heart failure (HCC)     Better controlled     Bipolar affective disorder ([facility] [address] 12/5/2012    Cellulitis of right forearm 5/4/2017    Chronic back pain     Chronic lung disease     Chronic obstructive pulmonary disease (COPD) (HCC)     SOB, on paula O2 Recent admission with psychosis  CKD stage G3a/A1, GFR 45-59 and albumin creatinine ratio <30 mg/g ([facility]) 2017    Urine microalbumin/Creatinine ratio (2017) = 9 mg/g    Contusion of left elbow 2017    Depression     Diabetes mellitus ([facility] [address]     Diabetic neuropathy associated with type 2 diabetes mellitus ([facility] [address]     Diastolic dysfunction without heart failure     Stable on diuretics     Falls frequently     Gastroesophageal reflux disease with hiatal hernia     Generalized osteoarthritis of multiple sites     Gout     History of acute renal failure 2013    History of back injury     jumped out of second story window     History of hepatitis C     treated    History of penicillin allergy     History of vitamin D deficiency 5/10/2017    Hyperuricemia 2017    Hypothyroidism     Hypoxemia requiring supplemental oxygen 2014    Intravenous drug user 2017    Memory difficulty     Mixed connective tissue disease ([facility] [address] 5/10/2017    Obesity, Class I, BMI 30-34.9     Olecranon bursitis of right elbow 2017    Recurrent genital herpes 2013    Right Achilles tendinitis 2017    Sarcoidosis     Sickle cell trait ([facility])     Type 2 diabetes with stage 3 chronic kidney disease GFR 30-59 ([facility] [address]     Urge urinary incontinence 5/10/2017    Venous insufficiency     Wears glasses        Past Surgical History:   Procedure Laterality Date    HX BACK SURGERY      HX  SECTION      HX CYST REMOVAL Left     Left foot    HX OTHER SURGICAL Left 2017    S/P incision and drainage of the abscess of the left hand and the left foot (2017 - Dr. Opal William)    HX TUBAL LIGATION         Current Outpatient Medications   Medication Sig Dispense Refill    colchicine (MITIGARE) 0.6 mg capsule       HYDROcodone-acetaminophen (NORCO) 5-325 mg per tablet Take 1 Tab by mouth every four (4) hours as needed for Pain. Max Daily Amount: 6 Tabs.  12 Tab 0    albuterol (PROAIR HFA) 90 mcg/actuation inhaler INHALE 2 PUFFS EVERY 4 HOURS AS NEEDED FOR WHEEZING 1 Inhaler 5    albuterol (PROVENTIL VENTOLIN) 2.5 mg /3 mL (0.083 %) nebulizer solution USE 1 NEB EVERY 4 HOURS FOR WHEEZING AND SHORTNESS OF BREATH 2 Package 3    umeclidinium-vilanterol (ANORO ELLIPTA) 62.5-25 mcg/actuation inhaler INHALE 1 PUFF DAILY 1 Inhaler 5    rosuvastatin (CRESTOR) 5 mg tablet TAKE ONE TABLET NIGHTLY 90 Tab 3    oxybutynin (DITROPAN) 5 mg tablet Take 5 mg by mouth two (2) times a day.  metOLazone (ZAROXOLYN) 2.5 mg tablet Take 2.5 mg by mouth every fourty-eight (48) hours.  allopurinol (ZYLOPRIM) 100 mg tablet Take  by mouth daily.  famotidine (PEPCID) 20 mg tablet Take 1 Tab by mouth daily. Indications: PREVENTION OF STRESS ULCER 15 Tab 0    insulin glargine (LANTUS) 100 unit/mL injection Inject 70 units subcutaneously before breakfast (7AM) and 40 units subcutaneously after dinner (7PM). Indications: type 2 diabetes mellitus 1 Vial 0    insulin lispro (HUMALOG) 100 unit/mL injection To be given 15 min before meals: < 150 - None, 151-200 - 2 u, 201-250 - 4 u, 251-300 - 6 u, 301-350 - 8 u, 351-400 - 10 u, >400 - 12 u 1 Vial 0    aspirin 81 mg chewable tablet Take 1 Tab by mouth daily (with breakfast). Indications: prevention of cerebrovascular accident 13 Tab 0    levothyroxine (SYNTHROID) 100 mcg tablet Take 1 Tab by mouth Daily (before breakfast). Indications: hypothyroidism 15 Tab 0    cholecalciferol (VITAMIN D3) 1,000 unit tablet Take 2 Tabs by mouth daily. Indications: PREVENTION OF VITAMIN D DEFICIENCY 30 Tab 0    divalproex DR (DEPAKOTE) 250 mg tablet Take 250 mg by mouth nightly. Indications: BIPOLAR DISORDER IN REMISSION      insulin syringe,safetyneedle 1 mL 30 gauge x 5/16\" syrg As directed 50 Each 0    ARIPiprazole (ABILIFY) 5 mg tablet Take 10 mg by mouth daily. Indications: BIPOLAR DISORDER IN REMISSION      gabapentin (NEURONTIN) 300 mg capsule Take 300 mg by mouth daily. Indications: NEUROPATHIC PAIN      traZODone (DESYREL) 100 mg tablet Take 100 mg by mouth as needed. Indications: major depressive disorder      Nebulizer Accessories Kit Use with nebulizer machine 1 Kit prn    sertraline (ZOLOFT) 100 mg tablet Take 50 mg by mouth daily. Indications: DEPRESSION ASSOCIATED WITH BIPOLAR DISORDER      Oxygen-Air Delivery Systems by Nasal route continuous. 2 LPM via NC  Indications: Hypoxemia requiring supplementary oxygen      predniSONE (DELTASONE) 50 mg tablet Take 1 Tab by mouth daily. 3 Tab 0    lidocaine HCl-me-salicyl-menth 0-07-31 % ktcg 1 Patch by Apply Externally route daily as needed. 1 Box 0    umeclidinium-vilanterol (ANORO ELLIPTA) 62.5-25 mcg/actuation inhaler Take 1 Puff by inhalation daily. 1 Inhaler 0    azithromycin (ZITHROMAX) 250 mg tablet Take 2 tabs by mouth on day one then 1 tab by mouth daily 6 Tab 0       Allergies   Allergen Reactions    Moxifloxacin Rash    Pcn [Penicillins] Swelling    Sulfa (Sulfonamide Antibiotics) Swelling         PE:     Right Hand: There is mild edema about the long finger and ring finger. There is minimal tenderness to palpation. There is no rotational deformity to any of the digits. Sensation is grossly intact. Cap refill is less than 2 seconds. Imaging:     Plain films of the right hand from February 17, 2018 show a minimally displace right ring finger proximal phalanx base fracture. There is no evidence of acute fracture in the long finger. ICD-10-CM ICD-9-CM    1. Closed nondisplaced fracture of proximal phalanx of right ring finger, initial encounter S62.644A 816.01 AMB SUPPLY ORDER       Plan: TKO brace worn an ulnar gutter fashion. I have instructed the patient not to take this off for 2 weeks and to wear it like a cast.  I have instructed her to put a plastic bag and rubber band around it for showering. Follow-up in 2 weeks for reevaluation and x-rays.      At this point we will likely transition her eveline vital    Plan was reviewed with patient, who verbalized agreement and understanding of the plan

## 2019-02-21 ENCOUNTER — OFFICE VISIT (OUTPATIENT)
Dept: CARDIOLOGY CLINIC | Age: 64
End: 2019-02-21

## 2019-02-21 VITALS
WEIGHT: 157 LBS | HEART RATE: 71 BPM | BODY MASS INDEX: 26.16 KG/M2 | SYSTOLIC BLOOD PRESSURE: 142 MMHG | DIASTOLIC BLOOD PRESSURE: 86 MMHG | HEIGHT: 65 IN

## 2019-02-21 DIAGNOSIS — E78.5 HYPERLIPIDEMIA, UNSPECIFIED HYPERLIPIDEMIA TYPE: ICD-10-CM

## 2019-02-21 DIAGNOSIS — J43.9 PULMONARY EMPHYSEMA, UNSPECIFIED EMPHYSEMA TYPE (HCC): ICD-10-CM

## 2019-02-21 DIAGNOSIS — D86.9 SARCOIDOSIS: ICD-10-CM

## 2019-02-21 DIAGNOSIS — R94.39 ABNORMAL NUCLEAR STRESS TEST: ICD-10-CM

## 2019-02-21 DIAGNOSIS — I50.32 DIASTOLIC CHF, CHRONIC (HCC): Primary | ICD-10-CM

## 2019-02-21 DIAGNOSIS — N18.31 CKD STAGE G3A/A1, GFR 45-59 AND ALBUMIN CREATININE RATIO <30 MG/G (HCC): ICD-10-CM

## 2019-02-21 RX ORDER — ROSUVASTATIN CALCIUM 5 MG/1
TABLET, COATED ORAL
Qty: 90 TAB | Refills: 3 | Status: SHIPPED | OUTPATIENT
Start: 2019-02-21

## 2019-02-21 RX ORDER — METOLAZONE 2.5 MG/1
2.5 TABLET ORAL
Qty: 90 TAB | Refills: 1 | Status: SHIPPED | OUTPATIENT
Start: 2019-02-21 | End: 2019-01-01

## 2019-02-21 RX ORDER — GUAIFENESIN 100 MG/5ML
81 LIQUID (ML) ORAL
Qty: 100 TAB | Refills: 1 | Status: SHIPPED | OUTPATIENT
Start: 2019-02-21 | End: 2019-01-01 | Stop reason: SDUPTHER

## 2019-02-21 NOTE — PROGRESS NOTES
HISTORY OF PRESENT ILLNESS  Radha Owusu is a 59 y.o. female. 2019-recent emergency room visit after a fall and had fracture of finger currently has a brace. CHF   The history is provided by the patient. This is a chronic problem. The problem occurs constantly. The problem has not changed since onset. Associated symptoms include shortness of breath. Pertinent negatives include no chest pain. The symptoms are aggravated by exertion. The symptoms are relieved by rest.   Cholesterol Problem   The history is provided by the patient. This is a chronic problem. The problem occurs constantly. The problem has not changed since onset. Associated symptoms include shortness of breath. Pertinent negatives include no chest pain. Hypertension   The history is provided by the patient. This is a chronic problem. The problem occurs constantly. The problem has not changed since onset. Associated symptoms include shortness of breath. Pertinent negatives include no chest pain. Nothing aggravates the symptoms. Shortness of Breath   The history is provided by the patient. This is a recurrent problem. The problem occurs frequently. The problem has been gradually improving. Associated symptoms include leg swelling. Pertinent negatives include no fever, no cough, no sputum production, no hemoptysis, no wheezing, no PND, no orthopnea, no chest pain, no vomiting, no rash and no claudication. Associated medical issues include COPD and heart failure. Leg Swelling   The history is provided by the patient. This is a recurrent problem. The problem occurs every several days. Associated symptoms include shortness of breath. Pertinent negatives include no chest pain.      Family History   Problem Relation Age of Onset    Seizures Other     Diabetes Mother     Hypertension Mother     Heart Disease Mother     Cancer Mother     Diabetes Father     Stroke Father     Heart Disease Father     Headache Sister     Depression Neg Hx     Suicide Neg Hx     Psychotic Disorder Neg Hx     Substance Abuse Neg Hx     Dementia Neg Hx        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 congestive heart failure ([facility])     Better controlled     Bipolar affective disorder ([facility] [address] 12/5/2012    Cellulitis of right forearm 5/4/2017    Chronic back pain     Chronic lung disease     Chronic obstructive pulmonary disease (COPD) ([facility])     SOB, on paula O2 Recent admission with psychosis     CKD stage G3a/A1, GFR 45-59 and albumin creatinine ratio <30 mg/g ([facility]) 5/11/2017    Urine microalbumin/Creatinine ratio (5/11/2017) = 9 mg/g    Contusion of left elbow 5/4/2017    Depression     Diabetes mellitus ([facility] [address]     Diabetic neuropathy associated with type 2 diabetes mellitus ([facility])     Diastolic dysfunction without heart failure     Stable on diuretics     Falls frequently     Gastroesophageal reflux disease with hiatal hernia     Generalized osteoarthritis of multiple sites     Gout     History of acute renal failure 5/31/2013    History of back injury     jumped out of second story window     History of hepatitis C     treated    History of penicillin allergy     History of vitamin D deficiency 5/10/2017    Hyperuricemia 5/26/2017    Hypothyroidism     Hypoxemia requiring supplemental oxygen 12/29/2014    Intravenous drug user 5/2/2017    Memory difficulty     Mixed connective tissue disease ([facility] [address] 5/10/2017    Obesity, Class I, BMI 30-34.9     Olecranon bursitis of right elbow 5/4/2017    Recurrent genital herpes 5/31/2013    Right Achilles tendinitis 5/2/2017    Sarcoidosis     Sickle cell trait ([facility])     Type 2 diabetes with stage 3 chronic kidney disease GFR 30-59 ([facility])     Urge urinary incontinence 5/10/2017    Venous insufficiency     Wears glasses        Past Surgical History:   Procedure Laterality Date    HX BACK SURGERY  1986    HX  SECTION      HX CYST REMOVAL Left     Left foot    HX OTHER SURGICAL Left 2017    S/P incision and drainage of the abscess of the left hand and the left foot (2017 - Dr. Luis A Crawford. Kalpesh [place])    HX TUBAL LIGATION         Allergies   Allergen Reactions    Moxifloxacin Rash    Pcn [Penicillins] Swelling    Sulfa (Sulfonamide Antibiotics) Swelling       Current Outpatient Medications   Medication Sig    metOLazone (ZAROXOLYN) 2.5 mg tablet Take 1 Tab by mouth every fourty-eight (48) hours.  rosuvastatin (CRESTOR) 5 mg tablet TAKE ONE TABLET NIGHTLY    aspirin 81 mg chewable tablet Take 1 Tab by mouth daily (with breakfast).  colchicine (MITIGARE) 0.6 mg capsule     predniSONE (DELTASONE) 50 mg tablet Take 1 Tab by mouth daily.  lidocaine HCl-me-salicyl-menth 9-86-94 % ktcg 1 Patch by Apply Externally route daily as needed.  HYDROcodone-acetaminophen (NORCO) 5-325 mg per tablet Take 1 Tab by mouth every four (4) hours as needed for Pain. Max Daily Amount: 6 Tabs.  albuterol (PROAIR HFA) 90 mcg/actuation inhaler INHALE 2 PUFFS EVERY 4 HOURS AS NEEDED FOR WHEEZING    albuterol (PROVENTIL VENTOLIN) 2.5 mg /3 mL (0.083 %) nebulizer solution USE 1 NEB EVERY 4 HOURS FOR WHEEZING AND SHORTNESS OF BREATH    umeclidinium-vilanterol (ANORO ELLIPTA) 62.5-25 mcg/actuation inhaler INHALE 1 PUFF DAILY    umeclidinium-vilanterol (ANORO ELLIPTA) 62.5-25 mcg/actuation inhaler Take 1 Puff by inhalation daily.  azithromycin (ZITHROMAX) 250 mg tablet Take 2 tabs by mouth on day one then 1 tab by mouth daily    oxybutynin (DITROPAN) 5 mg tablet Take 5 mg by mouth two (2) times a day.  allopurinol (ZYLOPRIM) 100 mg tablet Take  by mouth daily.  famotidine (PEPCID) 20 mg tablet Take 1 Tab by mouth daily.  Indications: PREVENTION OF STRESS ULCER    insulin glargine (LANTUS) 100 unit/mL injection Inject 70 units subcutaneously before breakfast (7AM) and 40 units subcutaneously after dinner (7PM). Indications: type 2 diabetes mellitus    insulin lispro (HUMALOG) 100 unit/mL injection To be given 15 min before meals: < 150 - None, 151-200 - 2 u, 201-250 - 4 u, 251-300 - 6 u, 301-350 - 8 u, 351-400 - 10 u, >400 - 12 u    levothyroxine (SYNTHROID) 100 mcg tablet Take 1 Tab by mouth Daily (before breakfast). Indications: hypothyroidism    cholecalciferol (VITAMIN D3) 1,000 unit tablet Take 2 Tabs by mouth daily. Indications: PREVENTION OF VITAMIN D DEFICIENCY    divalproex DR (DEPAKOTE) 250 mg tablet Take 250 mg by mouth nightly. Indications: BIPOLAR DISORDER IN REMISSION    insulin syringe,safetyneedle 1 mL 30 gauge x 5/16\" syrg As directed    ARIPiprazole (ABILIFY) 5 mg tablet Take 10 mg by mouth daily. Indications: BIPOLAR DISORDER IN REMISSION    gabapentin (NEURONTIN) 300 mg capsule Take 300 mg by mouth daily. Indications: NEUROPATHIC PAIN    traZODone (DESYREL) 100 mg tablet Take 100 mg by mouth as needed. Indications: major depressive disorder    Nebulizer Accessories Kit Use with nebulizer machine    sertraline (ZOLOFT) 100 mg tablet Take 50 mg by mouth daily. Indications: DEPRESSION ASSOCIATED WITH BIPOLAR DISORDER    Oxygen-Air Delivery Systems by Nasal route continuous. 2 LPM via NC  Indications: Hypoxemia requiring supplementary oxygen     No current facility-administered medications for this visit. Visit Vitals  /86   Pulse 71   Ht 5' 5\" (1.651 m)   Wt 71.2 kg (157 lb)   LMP 11/25/2012 (Exact Date)   BMI 26.13 kg/m²       Review of Systems   Constitutional: Negative for chills and fever. HENT: Negative for nosebleeds. Eyes: Negative for blurred vision and double vision. Respiratory: Positive for shortness of breath. Negative for cough, hemoptysis, sputum production and wheezing. Cardiovascular: Positive for leg swelling. Negative for chest pain, palpitations, orthopnea, claudication and PND. Gastrointestinal: Negative for heartburn, nausea and vomiting. Musculoskeletal: Negative for myalgias. Skin: Negative for rash. Neurological: Negative for dizziness and weakness. Endo/Heme/Allergies: Does not bruise/bleed easily.      Family History   Problem Relation Age of Onset    Seizures Other     Diabetes Mother     Hypertension Mother     Heart Disease Mother     Cancer Mother     Diabetes Father     Stroke Father     Heart Disease Father     Headache Sister     Depression Neg Hx     Suicide Neg Hx     Psychotic Disorder Neg Hx     Substance Abuse Neg Hx     Dementia Neg Hx        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 congestive heart failure (HCC)     Better controlled     Bipolar affective disorder ([address] 12/5/2012    Cellulitis of right forearm 5/4/2017    Chronic back pain     Chronic lung disease     Chronic obstructive pulmonary disease (COPD) ([facility])     SOB, on paula O2 Recent admission with psychosis     CKD stage G3a/A1, GFR 45-59 and albumin creatinine ratio <30 mg/g ([facility]) 5/11/2017    Urine microalbumin/Creatinine ratio (5/11/2017) = 9 mg/g    Contusion of left elbow 5/4/2017    Depression     Diabetes mellitus ([address]     Diabetic neuropathy associated with type 2 diabetes mellitus ([address]     Diastolic dysfunction without heart failure     Stable on diuretics     Falls frequently     Gastroesophageal reflux disease with hiatal hernia     Generalized osteoarthritis of multiple sites     Gout     History of acute renal failure 5/31/2013    History of back injury     jumped out of second story window     History of hepatitis C     treated    History of penicillin allergy     History of vitamin D deficiency 5/10/2017    Hyperuricemia 2017    Hypothyroidism     Hypoxemia requiring supplemental oxygen 2014    Intravenous drug user 2017    Memory difficulty     Mixed connective tissue disease ([facility] [address] 5/10/2017    Obesity, Class I, BMI 30-34.9     Olecranon bursitis of right elbow 2017    Recurrent genital herpes 2013    Right Achilles tendinitis 2017    Sarcoidosis     Sickle cell trait (HCC)     Type 2 diabetes with stage 3 chronic kidney disease GFR 30-59 ([facility])     Urge urinary incontinence 5/10/2017    Venous insufficiency     Wears glasses        Past Surgical History:   Procedure Laterality Date    HX BACK SURGERY      HX  SECTION      HX CYST REMOVAL Left     Left foot    HX OTHER SURGICAL Left 2017    S/P incision and drainage of the abscess of the left hand and the left foot (2017 -  [facility] [place])    HX TUBAL LIGATION         Allergies   Allergen Reactions    Moxifloxacin Rash    Pcn [Penicillins] Swelling    Sulfa (Sulfonamide Antibiotics) Swelling       Current Outpatient Medications   Medication Sig    metOLazone (ZAROXOLYN) 2.5 mg tablet Take 1 Tab by mouth every fourty-eight (48) hours.  rosuvastatin (CRESTOR) 5 mg tablet TAKE ONE TABLET NIGHTLY    aspirin 81 mg chewable tablet Take 1 Tab by mouth daily (with breakfast).  colchicine (MITIGARE) 0.6 mg capsule     predniSONE (DELTASONE) 50 mg tablet Take 1 Tab by mouth daily.  lidocaine HCl-me-salicyl-menth 9-57-77 % ktcg 1 Patch by Apply Externally route daily as needed.  HYDROcodone-acetaminophen (NORCO) 5-325 mg per tablet Take 1 Tab by mouth every four (4) hours as needed for Pain. Max Daily Amount: 6 Tabs.     albuterol (PROAIR HFA) 90 mcg/actuation inhaler INHALE 2 PUFFS EVERY 4 HOURS AS NEEDED FOR WHEEZING    albuterol (PROVENTIL VENTOLIN) 2.5 mg /3 mL (0.083 %) nebulizer solution USE 1 NEB EVERY 4 HOURS FOR WHEEZING AND SHORTNESS OF BREATH    umeclidinium-vilanterol (ANORO ELLIPTA) 62.5-25 mcg/actuation inhaler INHALE 1 PUFF DAILY    umeclidinium-vilanterol (ANORO ELLIPTA) 62.5-25 mcg/actuation inhaler Take 1 Puff by inhalation daily.  azithromycin (ZITHROMAX) 250 mg tablet Take 2 tabs by mouth on day one then 1 tab by mouth daily    oxybutynin (DITROPAN) 5 mg tablet Take 5 mg by mouth two (2) times a day.  allopurinol (ZYLOPRIM) 100 mg tablet Take  by mouth daily.  famotidine (PEPCID) 20 mg tablet Take 1 Tab by mouth daily. Indications: PREVENTION OF STRESS ULCER    insulin glargine (LANTUS) 100 unit/mL injection Inject 70 units subcutaneously before breakfast (7AM) and 40 units subcutaneously after dinner (7PM). Indications: type 2 diabetes mellitus    insulin lispro (HUMALOG) 100 unit/mL injection To be given 15 min before meals: < 150 - None, 151-200 - 2 u, 201-250 - 4 u, 251-300 - 6 u, 301-350 - 8 u, 351-400 - 10 u, >400 - 12 u    levothyroxine (SYNTHROID) 100 mcg tablet Take 1 Tab by mouth Daily (before breakfast). Indications: hypothyroidism    cholecalciferol (VITAMIN D3) 1,000 unit tablet Take 2 Tabs by mouth daily. Indications: PREVENTION OF VITAMIN D DEFICIENCY    divalproex DR (DEPAKOTE) 250 mg tablet Take 250 mg by mouth nightly. Indications: BIPOLAR DISORDER IN REMISSION    insulin syringe,safetyneedle 1 mL 30 gauge x 5/16\" syrg As directed    ARIPiprazole (ABILIFY) 5 mg tablet Take 10 mg by mouth daily. Indications: BIPOLAR DISORDER IN REMISSION    gabapentin (NEURONTIN) 300 mg capsule Take 300 mg by mouth daily. Indications: NEUROPATHIC PAIN    traZODone (DESYREL) 100 mg tablet Take 100 mg by mouth as needed. Indications: major depressive disorder    Nebulizer Accessories Kit Use with nebulizer machine    sertraline (ZOLOFT) 100 mg tablet Take 50 mg by mouth daily.  Indications: DEPRESSION ASSOCIATED WITH BIPOLAR DISORDER    Oxygen-Air Delivery Systems by Nasal route continuous. 2 LPM via NC  Indications: Hypoxemia requiring supplementary oxygen     No current facility-administered medications for this visit. Visit Vitals  /86   Pulse 71   Ht 5' 5\" (1.651 m)   Wt 71.2 kg (157 lb)   LMP 11/25/2012 (Exact Date)   BMI 26.13 kg/m²         Physical Exam   Constitutional: She is oriented to person, place, and time. She appears well-developed and well-nourished. HENT:   Head: Normocephalic and atraumatic. Eyes: Conjunctivae are normal.   Neck: Neck supple. No JVD present. No tracheal deviation present. No thyromegaly present. Cardiovascular: Normal rate and regular rhythm. PMI is not displaced. Exam reveals no gallop, no S3 and no decreased pulses. No murmur heard. Pulmonary/Chest: No respiratory distress. She has decreased breath sounds. She has wheezes. She has no rales. She exhibits no tenderness. Abdominal: Soft. There is no tenderness. Musculoskeletal: She exhibits no edema (mild edema). Neurological: She is alert and oriented to person, place, and time. Skin: Skin is warm. Psychiatric: She has a normal mood and affect. No flowsheet data found. Echo:4/2016   NORMAL GLOBAL LEFT VENTRICULAR SYSTOLIC FUNCTION. EJECTION FRACTION OF 65%. MILD DIASTOLIC DYSFUNCTION. DILATED LEFT ATRIUM. ESTIMATED PULMONARY ARTERY PRESSURE IS 39 MMHG. NO HEMODYNAMICALLY SIGNIFICANT VALVULAR PATHOLOGY. OTHER FINDINGS AS NOTED BELOW. I have personally reviewed patients ekg done at other facility. 7/2016-  I Have personally reviewed recent relevant labs available and discussed with patient  lui [facility]-7/2016  I have personally reviewed patient's records available from hospital and other providers and incorporated findings in patient care. IMAGING AND PROCEDURES-10/2016  1.  Chest x-ray was done and showed top normal size of heart, stable  hyperinflation, stable chronic increased interstitial marking consistent  with interstitial lung disease. 2. Dobutamine stress test was done, reported EF of 44% with a small  reversible inferior wall defect.-medically reated  3. Echocardiogram was done and showed 55% with grade 1 diastolic dysfunction. 4. Sputum culture was done and showed a few Demetra albicans.     Assessment       ICD-10-CM [id number]    1. Diastolic CHF, chronic ([facility]) I50.32 428.32      428.0     stable  multifactorial sob     2. Abnormal nuclear stress test R94.39 794.39     stable   3. Sarcoidosis D86.9 135     on home o2  on prednisone started recently for wheezing   4. CKD stage G3a/A1, GFR 45-59 and albumin creatinine ratio <30 mg/g ([facility]) N18.3 585.3     stable  renal f/u   5. Pulmonary emphysema, unspecified emphysema type ([facility] [address] J43.9 492.8     stable   6. Hyperlipidemia, unspecified hyperlipidemia type E78.5 272.4 LIPID PANEL   ok to stop plavix for procedures  8/2018  Okay to stop Plavix for dental work. Will discontinue Plavix. She was started on Plavix in 10/2016 for abnormal dobutamine stress test and mildly abnormal troponin. At this point will continue with aspirin only. Continue statin and other medication. Medications Discontinued During This Encounter   Medication Reason    metOLazone (ZAROXOLYN) 2.5 mg tablet Reorder    rosuvastatin (CRESTOR) 5 mg tablet Reorder    aspirin 81 mg chewable tablet Reorder       Orders Placed This Encounter    LIPID PANEL     Standing Status:   Future     Standing Expiration Date:   8/22/2019    metOLazone (ZAROXOLYN) 2.5 mg tablet     Sig: Take 1 Tab by mouth every fourty-eight (48) hours. Dispense:  90 Tab     Refill:  1    rosuvastatin (CRESTOR) 5 mg tablet     Sig: TAKE ONE TABLET NIGHTLY     Dispense:  90 Tab     Refill:  3    aspirin 81 mg chewable tablet     Sig: Take 1 Tab by mouth daily (with breakfast).      Dispense:  100 Tab     Refill:  1       Follow-up Disposition:  Return in about 6 months (around 8/21/2019).

## 2019-02-21 NOTE — PROGRESS NOTES
1. Have you been to the ER, urgent care clinic since your last visit? Hospitalized since your last visit? Yes [facility]   2. Have you seen or consulted any other health care providers outside of the [address] [place] [address] since your last visit? Include any pap smears or colon screening. No     3. Since your last visit, have you had any of the following symptoms? shortness of breath.

## 2019-02-21 NOTE — LETTER
Stephen Sinha 1955 2/21/2019 Dear MD Tree Petersen MD 
 
I had the pleasure of evaluating  Ms. Sinha in office today. Below are the relevant portions of my assessment and plan of care. ICD-10-CM ICD-9-CM 1. Diastolic CHF, chronic (HCC) I50.32 428.32   
  428.0   
 stable 
multifactorial sob 2. Abnormal nuclear stress test R94.39 794.39   
 stable 3. Sarcoidosis D86.9 135   
 on home o2 
on prednisone started recently for wheezing 4. CKD stage G3a/A1, GFR 45-59 and albumin creatinine ratio <30 mg/g ([facility]) N18.3 585.3   
 stable 
renal f/u 5. Pulmonary emphysema, unspecified emphysema type ([facility] [address] J43.9 492.8   
 stable 6. Hyperlipidemia, unspecified hyperlipidemia type E78.5 272.4 LIPID PANEL Current Outpatient Medications Medication Sig Dispense Refill  metOLazone (ZAROXOLYN) 2.5 mg tablet Take 1 Tab by mouth every fourty-eight (48) hours. 90 Tab 1  
 rosuvastatin (CRESTOR) 5 mg tablet TAKE ONE TABLET NIGHTLY 90 Tab 3  
 aspirin 81 mg chewable tablet Take 1 Tab by mouth daily (with breakfast). 100 Tab 1  
 colchicine (MITIGARE) 0.6 mg capsule  predniSONE (DELTASONE) 50 mg tablet Take 1 Tab by mouth daily. 3 Tab 0  
 lidocaine HCl-me-salicyl-menth 5-52-67 % ktcg 1 Patch by Apply Externally route daily as needed. 1 Box 0  
 HYDROcodone-acetaminophen (NORCO) 5-325 mg per tablet Take 1 Tab by mouth every four (4) hours as needed for Pain. Max Daily Amount: 6 Tabs.  12 Tab 0  
 albuterol (PROAIR HFA) 90 mcg/actuation inhaler INHALE 2 PUFFS EVERY 4 HOURS AS NEEDED FOR WHEEZING 1 Inhaler 5  
 albuterol (PROVENTIL VENTOLIN) 2.5 mg /3 mL (0.083 %) nebulizer solution USE 1 NEB EVERY 4 HOURS FOR WHEEZING AND SHORTNESS OF BREATH 2 Package 3  
 umeclidinium-vilanterol (ANORO ELLIPTA) 62.5-25 mcg/actuation inhaler INHALE 1 PUFF DAILY 1 Inhaler 5  
 umeclidinium-vilanterol (ANORO ELLIPTA) 62.5-25 mcg/actuation inhaler Take 1 Puff by inhalation daily. 1 Inhaler 0  
 azithromycin (ZITHROMAX) 250 mg tablet Take 2 tabs by mouth on day one then 1 tab by mouth daily 6 Tab 0  
 oxybutynin (DITROPAN) 5 mg tablet Take 5 mg by mouth two (2) times a day.  allopurinol (ZYLOPRIM) 100 mg tablet Take  by mouth daily.  famotidine (PEPCID) 20 mg tablet Take 1 Tab by mouth daily. Indications: PREVENTION OF STRESS ULCER 15 Tab 0  
 insulin glargine (LANTUS) 100 unit/mL injection Inject 70 units subcutaneously before breakfast (7AM) and 40 units subcutaneously after dinner (7PM). Indications: type 2 diabetes mellitus 1 Vial 0  
 insulin lispro (HUMALOG) 100 unit/mL injection To be given 15 min before meals: < 150 - None, 151-200 - 2 u, 201-250 - 4 u, 251-300 - 6 u, 301-350 - 8 u, 351-400 - 10 u, >400 - 12 u 1 Vial 0  
 levothyroxine (SYNTHROID) 100 mcg tablet Take 1 Tab by mouth Daily (before breakfast). Indications: hypothyroidism 15 Tab 0  cholecalciferol (VITAMIN D3) 1,000 unit tablet Take 2 Tabs by mouth daily. Indications: PREVENTION OF VITAMIN D DEFICIENCY 30 Tab 0  
 divalproex DR (DEPAKOTE) 250 mg tablet Take 250 mg by mouth nightly. Indications: BIPOLAR DISORDER IN REMISSION  insulin syringe,safetyneedle 1 mL 30 gauge x 5/16\" syrg As directed 50 Each 0  
 ARIPiprazole (ABILIFY) 5 mg tablet Take 10 mg by mouth daily. Indications: BIPOLAR DISORDER IN REMISSION    
 gabapentin (NEURONTIN) 300 mg capsule Take 300 mg by mouth daily. Indications: NEUROPATHIC PAIN    
 traZODone (DESYREL) 100 mg tablet Take 100 mg by mouth as needed. Indications: major depressive disorder  Nebulizer Accessories Kit Use with nebulizer machine 1 Kit prn  sertraline (ZOLOFT) 100 mg tablet Take 50 mg by mouth daily. Indications: DEPRESSION ASSOCIATED WITH BIPOLAR DISORDER    
 Oxygen-Air Delivery Systems by Nasal route continuous. 2 LPM via NC  Indications: Hypoxemia requiring supplementary oxygen Orders Placed This Encounter  LIPID PANEL Standing Status:   Future Standing Expiration Date:   8/22/2019  
 metOLazone (ZAROXOLYN) 2.5 mg tablet Sig: Take 1 Tab by mouth every fourty-eight (48) hours. Dispense:  90 Tab Refill:  1  rosuvastatin (CRESTOR) 5 mg tablet Sig: TAKE ONE TABLET NIGHTLY Dispense:  90 Tab Refill:  3  
 aspirin 81 mg chewable tablet Sig: Take 1 Tab by mouth daily (with breakfast). Dispense:  100 Tab Refill:  1 If you have questions, please do not hesitate to call me. I look forward to following Ms. Sinha along with you. Sincerely, Daily Mccarty MD

## 2019-03-12 ENCOUNTER — OFFICE VISIT (OUTPATIENT)
Dept: ORTHOPEDIC SURGERY | Facility: CLINIC | Age: 64
End: 2019-03-12

## 2019-03-12 ENCOUNTER — HOSPITAL ENCOUNTER (OUTPATIENT)
Dept: LAB | Age: 64
Discharge: HOME | End: 2019-03-12
Payer: MEDICAID

## 2019-03-12 VITALS
RESPIRATION RATE: 16 BRPM | HEIGHT: 65 IN | TEMPERATURE: 95.7 F | DIASTOLIC BLOOD PRESSURE: 92 MMHG | WEIGHT: 161.2 LBS | SYSTOLIC BLOOD PRESSURE: 145 MMHG | OXYGEN SATURATION: 99 % | HEART RATE: 91 BPM | BODY MASS INDEX: 26.86 KG/M2

## 2019-03-12 DIAGNOSIS — S62.644D CLOSED NONDISPLACED FRACTURE OF PROXIMAL PHALANX OF RIGHT RING FINGER WITH ROUTINE HEALING, SUBSEQUENT ENCOUNTER: Primary | ICD-10-CM

## 2019-03-12 LAB
ALBUMIN SERPL-MCNC: 4.1 G/DL (ref 3.4–5)
ALBUMIN/GLOB SERPL: 0.8 {RATIO} (ref 0.8–1.7)
ALP SERPL-CCNC: 82 U/L (ref 45–117)
ALT SERPL-CCNC: 17 U/L (ref 13–56)
ANION GAP SERPL CALC-SCNC: 8 MMOL/L (ref 3–18)
APPEARANCE UR: CLEAR
AST SERPL-CCNC: 20 U/L (ref 15–37)
BILIRUB SERPL-MCNC: 0.6 MG/DL (ref 0.2–1)
BILIRUB UR QL: NEGATIVE
BUN SERPL-MCNC: 22 MG/DL (ref 7–18)
BUN/CREAT SERPL: 16 (ref 12–20)
CALCIUM SERPL-MCNC: 9.4 MG/DL (ref 8.5–10.1)
CHLORIDE SERPL-SCNC: 101 MMOL/L (ref 100–108)
CO2 SERPL-SCNC: 30 MMOL/L (ref 21–32)
COLOR UR: YELLOW
CREAT SERPL-MCNC: 1.35 MG/DL (ref 0.6–1.3)
ERYTHROCYTE [SEDIMENTATION RATE] IN BLOOD: 13 MM/HR (ref 0–30)
GLOBULIN SER CALC-MCNC: 4.9 G/DL (ref 2–4)
GLUCOSE SERPL-MCNC: 91 MG/DL (ref 74–99)
GLUCOSE UR STRIP.AUTO-MCNC: NEGATIVE MG/DL
HGB UR QL STRIP: NEGATIVE
KETONES UR QL STRIP.AUTO: NEGATIVE MG/DL
LEUKOCYTE ESTERASE UR QL STRIP.AUTO: NEGATIVE
NITRITE UR QL STRIP.AUTO: NEGATIVE
PH UR STRIP: 5 [PH] (ref 5–8)
POTASSIUM SERPL-SCNC: 4.3 MMOL/L (ref 3.5–5.5)
PROT SERPL-MCNC: 9 G/DL (ref 6.4–8.2)
PROT UR STRIP-MCNC: NEGATIVE MG/DL
SODIUM SERPL-SCNC: 139 MMOL/L (ref 136–145)
SP GR UR REFRACTOMETRY: 1.02 (ref 1–1.03)
URATE SERPL-MCNC: 2.8 MG/DL (ref 2.6–7.2)
UROBILINOGEN UR QL STRIP.AUTO: 0.2 EU/DL (ref 0.2–1)

## 2019-03-12 PROCEDURE — 36415 COLL VENOUS BLD VENIPUNCTURE: CPT

## 2019-03-12 PROCEDURE — 84550 ASSAY OF BLOOD/URIC ACID: CPT

## 2019-03-12 PROCEDURE — 81003 URINALYSIS AUTO W/O SCOPE: CPT

## 2019-03-12 PROCEDURE — 86140 C-REACTIVE PROTEIN: CPT

## 2019-03-12 PROCEDURE — 80053 COMPREHEN METABOLIC PANEL: CPT

## 2019-03-12 PROCEDURE — 85652 RBC SED RATE AUTOMATED: CPT

## 2019-03-12 NOTE — PROGRESS NOTES
Michael Woodall is a 59 y.o. female right handed retiree. Worker's Compensation and legal considerations: not known. Vitals:    03/12/19 1309   BP: (!) 145/92   Pulse: 91   Resp: 16   Temp: 95.7 °F (35.4 °C)   TempSrc: Oral   SpO2: 99%   Weight: 161 lb 3.2 oz (73.1 kg)   Height: 5' 5\" (1.651 m)   LMP: 11/25/2012           Chief Complaint   Patient presents with    Finger Pain     right ring finger pain     HPI: Patient comes in today nearly 4 weeks status post fall and fracture to her right ring finger proximal phalanx. She has been in a TKO brace for the last 2 weeks. She has not had any reinjuries or falls. She reports her pain is almost completely resolved. Initial HPI: Patient comes in today after suffering a fall on Friday, February 15. She went to the emergency department on February 17 and had x-rays done and was diagnosed with a ring finger fracture. She reports the swelling has markedly improved since that visit. She denies any new falls or new injuries since the original.  She has been in a splint since that time. She is also reporting swelling in her middle finger since the injury.       Date of onset: 2/15/2019    Injury: Yes: Comment: Fall    Prior Treatment: TKO brace times 2 weeks    Numbness/ Tingling: No    ROS: Review of Systems - General ROS: negative  Respiratory ROS: no cough, shortness of breath, or wheezing  Cardiovascular ROS: no chest pain or dyspnea on exertion  Musculoskeletal ROS: positive for - pain in finger - right and swelling in finger - right  Neurological ROS: negative  Dermatological ROS: negative    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 congestive heart failure (HCC)     Better controlled     Bipolar affective disorder ([facility] 2012    Cellulitis of right forearm 2017    Chronic back pain     Chronic lung disease     Chronic obstructive pulmonary disease (COPD) ([facility])     SOB, on paula O2 Recent admission with psychosis     CKD stage G3a/A1, GFR 45-59 and albumin creatinine ratio <30 mg/g ([facility]) 2017    Urine microalbumin/Creatinine ratio (2017) = 9 mg/g    Contusion of left elbow 2017    Depression     Diabetes mellitus ([address]     Diabetic neuropathy associated with type 2 diabetes mellitus ([facility])     Diastolic dysfunction without heart failure     Stable on diuretics     Falls frequently     Gastroesophageal reflux disease with hiatal hernia     Generalized osteoarthritis of multiple sites     Gout     History of acute renal failure 2013    History of back injury     jumped out of second story window     History of hepatitis C     treated    History of penicillin allergy     History of vitamin D deficiency 5/10/2017    Hyperuricemia 2017    Hypothyroidism     Hypoxemia requiring supplemental oxygen 2014    Intravenous drug user 2017    Memory difficulty     Mixed connective tissue disease ([facility] [facility] 5/10/2017    Obesity, Class I, BMI 30-34.9     Olecranon bursitis of right elbow 2017    Recurrent genital herpes 2013    Right Achilles tendinitis 2017    Sarcoidosis     Sickle cell trait ([facility])     Type 2 diabetes with stage 3 chronic kidney disease GFR 30-59 ([address]     Urge urinary incontinence 5/10/2017    Venous insufficiency     Wears glasses        Past Surgical History:   Procedure Laterality Date    HX BACK SURGERY      HX  SECTION      HX CYST REMOVAL Left     Left foot    HX OTHER SURGICAL Left 2017    S/P incision and drainage of the abscess of the left hand and the left foot (2017 - Dr. Mills.  [facility])    HX TUBAL LIGATION         Current Outpatient Medications   Medication Sig Dispense Refill  metOLazone (ZAROXOLYN) 2.5 mg tablet Take 1 Tab by mouth every fourty-eight (48) hours. 90 Tab 1    rosuvastatin (CRESTOR) 5 mg tablet TAKE ONE TABLET NIGHTLY 90 Tab 3    aspirin 81 mg chewable tablet Take 1 Tab by mouth daily (with breakfast). 100 Tab 1    colchicine (MITIGARE) 0.6 mg capsule       lidocaine HCl-me-salicyl-menth 7-97-07 % ktcg 1 Patch by Apply Externally route daily as needed. 1 Box 0    albuterol (PROAIR HFA) 90 mcg/actuation inhaler INHALE 2 PUFFS EVERY 4 HOURS AS NEEDED FOR WHEEZING 1 Inhaler 5    albuterol (PROVENTIL VENTOLIN) 2.5 mg /3 mL (0.083 %) nebulizer solution USE 1 NEB EVERY 4 HOURS FOR WHEEZING AND SHORTNESS OF BREATH 2 Package 3    umeclidinium-vilanterol (ANORO ELLIPTA) 62.5-25 mcg/actuation inhaler INHALE 1 PUFF DAILY 1 Inhaler 5    oxybutynin (DITROPAN) 5 mg tablet Take 5 mg by mouth two (2) times a day.  allopurinol (ZYLOPRIM) 100 mg tablet Take  by mouth daily.  famotidine (PEPCID) 20 mg tablet Take 1 Tab by mouth daily. Indications: PREVENTION OF STRESS ULCER 15 Tab 0    insulin glargine (LANTUS) 100 unit/mL injection Inject 70 units subcutaneously before breakfast (7AM) and 40 units subcutaneously after dinner (7PM). Indications: type 2 diabetes mellitus 1 Vial 0    insulin lispro (HUMALOG) 100 unit/mL injection To be given 15 min before meals: < 150 - None, 151-200 - 2 u, 201-250 - 4 u, 251-300 - 6 u, 301-350 - 8 u, 351-400 - 10 u, >400 - 12 u 1 Vial 0    levothyroxine (SYNTHROID) 100 mcg tablet Take 1 Tab by mouth Daily (before breakfast). Indications: hypothyroidism 15 Tab 0    cholecalciferol (VITAMIN D3) 1,000 unit tablet Take 2 Tabs by mouth daily. Indications: PREVENTION OF VITAMIN D DEFICIENCY 30 Tab 0    divalproex DR (DEPAKOTE) 250 mg tablet Take 250 mg by mouth nightly.  Indications: BIPOLAR DISORDER IN REMISSION      insulin syringe,safetyneedle 1 mL 30 gauge x 5/16\" syrg As directed 50 Each 0    ARIPiprazole (ABILIFY) 5 mg tablet Take 10 mg by mouth daily. Indications: BIPOLAR DISORDER IN REMISSION      gabapentin (NEURONTIN) 300 mg capsule Take 300 mg by mouth daily. Indications: NEUROPATHIC PAIN      traZODone (DESYREL) 100 mg tablet Take 100 mg by mouth as needed. Indications: major depressive disorder      Nebulizer Accessories Kit Use with nebulizer machine 1 Kit prn    sertraline (ZOLOFT) 100 mg tablet Take 50 mg by mouth daily. Indications: DEPRESSION ASSOCIATED WITH BIPOLAR DISORDER      Oxygen-Air Delivery Systems by Nasal route continuous. 2 LPM via NC  Indications: Hypoxemia requiring supplementary oxygen      predniSONE (DELTASONE) 50 mg tablet Take 1 Tab by mouth daily. 3 Tab 0    HYDROcodone-acetaminophen (NORCO) 5-325 mg per tablet Take 1 Tab by mouth every four (4) hours as needed for Pain. Max Daily Amount: 6 Tabs. 12 Tab 0    azithromycin (ZITHROMAX) 250 mg tablet Take 2 tabs by mouth on day one then 1 tab by mouth daily 6 Tab 0       Allergies   Allergen Reactions    Moxifloxacin Rash    Pcn [Penicillins] Swelling    Sulfa (Sulfonamide Antibiotics) Swelling         PE:     Right Hand: There is no edema noted to the right ring finger. There is no tenderness to palpation noted to the proximal phalanx. She has diminished range of motion of her ring finger. She has a palm to pulp distance of approximately 4 cm. This is mostly due to stiffness and does not cause pain when she tries to make a fist.    Imaging:     Plain films 3 view of the right ring finger shows a proximal phalanx fracture with mild callus formation compared to previous views. The fracture appears to be minimally to nondisplaced. ICD-10-CM ICD-9-CM    1. Closed nondisplaced fracture of proximal phalanx of right ring finger with routine healing, subsequent encounter S62.644D V54.19 AMB POC XRAY, FINGER(S), 2+ VIEWS       Plan:      We have given the patient buddy straps today which she can wear whenever she is in the house but she can take off for showering. I told her to wear her TKO brace whenever she leaves the apartment. I told her to work on range of motion of her ring finger and small finger together every day for 2-3 times a day. Follow-up in 2 weeks for reevaluation and x-rays.     Plan was reviewed with patient, who verbalized agreement and understanding of the plan

## 2019-03-12 NOTE — PATIENT INSTRUCTIONS
Finger Fracture: Care Instructions  Your Care Instructions    Breaks in the bones of the finger usually heal well in about 3 to 4 weeks. The pain and swelling from a broken finger can last for weeks. But it should steadily improve, starting a few days after you break it. It is very important that you wear and take care of the cast or splint exactly as your doctor tells you to so that your finger heals properly and does not end up crooked. Wearing a splint may interfere with your normal activities. Ask for help with daily tasks if you need it. You heal best when you take good care of yourself. Eat a variety of healthy foods, and don't smoke. Follow-up care is a key part of your treatment and safety. Be sure to make and go to all appointments, and call your doctor if you are having problems. It's also a good idea to know your test results and keep a list of the medicines you take. How can you care for yourself at home? · If your doctor put a splint on your finger, wear the splint exactly as directed. Do not remove it until your doctor says that you can. · Keep your hand raised above the level of your heart as much as you can. This will help reduce swelling. · Put ice or a cold pack on your finger for 10 to 20 minutes at a time. Try to do this every 1 to 2 hours for the next 3 days (when you are awake) or until the swelling goes down. Put a thin cloth between the ice and your skin. Keep the splint dry. · Be safe with medicines. Take pain medicines exactly as directed. ? If the doctor gave you a prescription medicine for pain, take it as prescribed. ? If you are not taking a prescription pain medicine, ask your doctor if you can take an over-the-counter medicine. When should you call for help? Call 911 anytime you think you may need emergency care.  For example, call if:    · Your finger is cool or pale or changes color.    Call your doctor now or seek immediate medical care if:    · Your pain gets much worse.     · You have tingling, weakness, or numbness in your finger.     · You have signs of infection, such as:  ? Increased pain, swelling, warmth, or redness. ? Red streaks leading from the area. ? Pus draining from the area. ? Swollen lymph nodes in your neck, armpits, or groin. ? A fever.    Watch closely for changes in your health, and be sure to contact your doctor if:    · Your finger is not steadily improving. Where can you learn more? Go to http://rick-kathya.info/. Enter A731 in the search box to learn more about \"Finger Fracture: Care Instructions. \"  Current as of: September 20, 2018  Content Version: 11.9  © 2407-0125 [employer]. Care instructions adapted under license by [employer] (which disclaims liability or warranty for this information). If you have questions about a medical condition or this instruction, always ask your healthcare professional. [place] [address] any warranty or liability for your use of this information.

## 2019-03-12 NOTE — PROGRESS NOTES
1. Have you been to the ER, urgent care clinic since your last visit? Hospitalized since your last visit? Yes, [facility]     2. Have you seen or consulted any other health care providers outside of the [address] [place] [address] since your last visit? Include any pap smears or colon screening.  Yes, [facility]

## 2019-03-13 ENCOUNTER — TELEPHONE (OUTPATIENT)
Dept: CARDIOLOGY CLINIC | Age: 64
End: 2019-03-13

## 2019-03-13 LAB — CRP SERPL-MCNC: <0.3 MG/DL (ref 0–0.3)

## 2019-03-13 NOTE — TELEPHONE ENCOUNTER
Registered Nurse TOD with [address] would like to know if the patient could go back to taking Metolazone 2.5 mg every Mon, Weds and Fri instead of every 48 hours.

## 2019-03-15 NOTE — TELEPHONE ENCOUNTER
Called and left message with AJ regarding medication per Dr. Bg Landaverde, may take metolazone every mom/wed/fri- monitor weight gain,edema and sob. If any questions to call the office.

## 2019-03-22 ENCOUNTER — OFFICE VISIT (OUTPATIENT)
Dept: PULMONOLOGY | Age: 64
End: 2019-03-22

## 2019-03-22 VITALS
OXYGEN SATURATION: 93 % | TEMPERATURE: 97.7 F | RESPIRATION RATE: 18 BRPM | WEIGHT: 162 LBS | HEIGHT: 65 IN | SYSTOLIC BLOOD PRESSURE: 104 MMHG | DIASTOLIC BLOOD PRESSURE: 54 MMHG | HEART RATE: 99 BPM | BODY MASS INDEX: 26.99 KG/M2

## 2019-03-22 DIAGNOSIS — R09.02 HYPOXEMIA REQUIRING SUPPLEMENTAL OXYGEN: ICD-10-CM

## 2019-03-22 DIAGNOSIS — F17.200 TOBACCO DEPENDENCE: ICD-10-CM

## 2019-03-22 DIAGNOSIS — J44.9 COPD, SEVERE (HCC): ICD-10-CM

## 2019-03-22 DIAGNOSIS — Z99.81 HYPOXEMIA REQUIRING SUPPLEMENTAL OXYGEN: ICD-10-CM

## 2019-03-22 DIAGNOSIS — J44.1 COPD EXACERBATION (HCC): Primary | ICD-10-CM

## 2019-03-22 RX ORDER — PREDNISONE 10 MG/1
TABLET ORAL
Qty: 18 TAB | Refills: 0 | Status: SHIPPED | OUTPATIENT
Start: 2019-03-22 | End: 2019-01-01

## 2019-03-22 RX ORDER — AZITHROMYCIN 250 MG/1
TABLET, FILM COATED ORAL
Qty: 6 TAB | Refills: 0 | Status: SHIPPED | OUTPATIENT
Start: 2019-03-22 | End: 2019-05-13 | Stop reason: ALTCHOICE

## 2019-03-22 NOTE — PROGRESS NOTES
HISTORY OF PRESENT ILLNESS  Rosalie Loyola is a 59 y.o. female. History of Sarcoidosis and COPD, last FEV 1 in 2012 was 51%, pt has been unable to perform FVC maneuvers since. Pt resumed controller Anoro and has been off Prednisone but now complains of increased SOB and a cough productive of yellowish phlegm. . She denies fever. Uses O2 despite continued smoking. Pt with history of CKD but reports improvement in renal function per nephrology. Pt reports improved glycemic control with A1C of 6.1 after significant weight loss, she is on a strict diet due to gout but had a recent flare up after dietary indiscretion. Pt sustained injuries to her right hand after a recent fall but otherwise has no new symptoms. Pt admits to smoking 1 pack of cigarettes daily but no further use of recreational drugs. COPD   The history is provided by the patient. This is a chronic problem. The problem occurs every several days. The problem has been gradually improving. Associated symptoms include shortness of breath. Pertinent negatives include no chest pain and no headaches. The symptoms are aggravated by exertion. The symptoms are relieved by medications. Review of Systems   Constitutional: Positive for malaise/fatigue. Negative for chills, diaphoresis, fever and weight loss. HENT: Negative for congestion, ear discharge, ear pain, hearing loss, nosebleeds, sinus pain, sore throat and tinnitus. Eyes: Negative for blurred vision, double vision, photophobia, pain, discharge and redness. Respiratory: Positive for cough, sputum production, shortness of breath and wheezing. Negative for hemoptysis and stridor. Cardiovascular: Positive for leg swelling. Negative for chest pain, palpitations, orthopnea, claudication and PND. Gastrointestinal: Negative for blood in stool, constipation, diarrhea, heartburn, melena, nausea and vomiting.    Genitourinary: Negative for dysuria, flank pain, frequency, hematuria and urgency. Musculoskeletal: Positive for back pain and joint pain. Negative for falls, myalgias and neck pain. Skin: Negative for itching and rash. Neurological: Negative for dizziness, tingling, tremors, sensory change, speech change, focal weakness, seizures, loss of consciousness, weakness and headaches. Endo/Heme/Allergies: Negative for environmental allergies and polydipsia. Does not bruise/bleed easily. Psychiatric/Behavioral: Positive for depression. Negative for hallucinations, memory loss, substance abuse and suicidal ideas. The patient is not nervous/anxious and does not have insomnia.       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 congestive heart failure (HCC)     Better controlled     Bipolar affective disorder ([facility] [address] 12/5/2012    Cellulitis of right forearm 5/4/2017    Chronic back pain     Chronic lung disease     Chronic obstructive pulmonary disease (COPD) ([facility])     SOB, on paula O2 Recent admission with psychosis     CKD stage G3a/A1, GFR 45-59 and albumin creatinine ratio <30 mg/g ([facility]) 5/11/2017    Urine microalbumin/Creatinine ratio (5/11/2017) = 9 mg/g    Contusion of left elbow 5/4/2017    Depression     Diabetes mellitus ([facility] [address]     Diabetic neuropathy associated with type 2 diabetes mellitus (HCC)     Diastolic dysfunction without heart failure     Stable on diuretics     Falls frequently     Gastroesophageal reflux disease with hiatal hernia     Generalized osteoarthritis of multiple sites     Gout     History of acute renal failure 5/31/2013    History of back injury     jumped out of second story window     History of hepatitis C     treated    History of penicillin allergy     History of vitamin D deficiency 5/10/2017    Hyperuricemia 5/26/2017    Hypothyroidism     Hypoxemia requiring supplemental oxygen 12/29/2014    Intravenous drug user 5/2/2017    Memory difficulty     Mixed connective tissue disease ([facility] [address] 5/10/2017    Obesity, Class I, BMI 30-34.9     Olecranon bursitis of right elbow 5/4/2017    Recurrent genital herpes 5/31/2013    Right Achilles tendinitis 5/2/2017    Sarcoidosis     Sickle cell trait ([facility])     Type 2 diabetes with stage 3 chronic kidney disease GFR 30-59 ([facility])     Urge urinary incontinence 5/10/2017    Venous insufficiency     Wears glasses      Current Outpatient Medications on File Prior to Visit   Medication Sig Dispense Refill    metOLazone (ZAROXOLYN) 2.5 mg tablet Take 1 Tab by mouth every fourty-eight (48) hours. 90 Tab 1    rosuvastatin (CRESTOR) 5 mg tablet TAKE ONE TABLET NIGHTLY 90 Tab 3    aspirin 81 mg chewable tablet Take 1 Tab by mouth daily (with breakfast). 100 Tab 1    colchicine (MITIGARE) 0.6 mg capsule       HYDROcodone-acetaminophen (NORCO) 5-325 mg per tablet Take 1 Tab by mouth every four (4) hours as needed for Pain. Max Daily Amount: 6 Tabs. 12 Tab 0    albuterol (PROAIR HFA) 90 mcg/actuation inhaler INHALE 2 PUFFS EVERY 4 HOURS AS NEEDED FOR WHEEZING 1 Inhaler 5    albuterol (PROVENTIL VENTOLIN) 2.5 mg /3 mL (0.083 %) nebulizer solution USE 1 NEB EVERY 4 HOURS FOR WHEEZING AND SHORTNESS OF BREATH 2 Package 3    umeclidinium-vilanterol (ANORO ELLIPTA) 62.5-25 mcg/actuation inhaler INHALE 1 PUFF DAILY 1 Inhaler 5    oxybutynin (DITROPAN) 5 mg tablet Take 5 mg by mouth two (2) times a day.  allopurinol (ZYLOPRIM) 100 mg tablet Take  by mouth daily.  famotidine (PEPCID) 20 mg tablet Take 1 Tab by mouth daily. Indications: PREVENTION OF STRESS ULCER 15 Tab 0    insulin glargine (LANTUS) 100 unit/mL injection Inject 70 units subcutaneously before breakfast (7AM) and 40 units subcutaneously after dinner (7PM).   Indications: type 2 diabetes mellitus 1 Vial 0    insulin lispro (HUMALOG) 100 unit/mL injection To be given 15 min before meals: < 150 - None, 151-200 - 2 u, 201-250 - 4 u, 251-300 - 6 u, 301-350 - 8 u, 351-400 - 10 u, >400 - 12 u 1 Vial 0    levothyroxine (SYNTHROID) 100 mcg tablet Take 1 Tab by mouth Daily (before breakfast). Indications: hypothyroidism 15 Tab 0    cholecalciferol (VITAMIN D3) 1,000 unit tablet Take 2 Tabs by mouth daily. Indications: PREVENTION OF VITAMIN D DEFICIENCY 30 Tab 0    divalproex DR (DEPAKOTE) 250 mg tablet Take 250 mg by mouth nightly. Indications: BIPOLAR DISORDER IN REMISSION      insulin syringe,safetyneedle 1 mL 30 gauge x 5/16\" syrg As directed 50 Each 0    ARIPiprazole (ABILIFY) 5 mg tablet Take 10 mg by mouth daily. Indications: BIPOLAR DISORDER IN REMISSION      gabapentin (NEURONTIN) 300 mg capsule Take 300 mg by mouth daily. Indications: NEUROPATHIC PAIN      traZODone (DESYREL) 100 mg tablet Take 100 mg by mouth as needed. Indications: major depressive disorder      Nebulizer Accessories Kit Use with nebulizer machine 1 Kit prn    sertraline (ZOLOFT) 100 mg tablet Take 50 mg by mouth daily. Indications: DEPRESSION ASSOCIATED WITH BIPOLAR DISORDER      Oxygen-Air Delivery Systems by Nasal route continuous. 2 LPM via NC  Indications: Hypoxemia requiring supplementary oxygen      lidocaine HCl-me-salicyl-menth 1-69-06 % ktcg 1 Patch by Apply Externally route daily as needed. 1 Box 0     No current facility-administered medications on file prior to visit.       Allergies   Allergen Reactions    Moxifloxacin Rash    Pcn [Penicillins] Swelling    Sulfa (Sulfonamide Antibiotics) Swelling     Social History     Socioeconomic History    Marital status: SINGLE     Spouse name: Not on file    Number of children: Not on file    Years of education: Not on file    Highest education level: Not on file   Occupational History    Occupation: Not employed     Employer: NOT EMPLOYED   Social Needs    Financial resource strain: Not on file    Food insecurity:     Worry: Not on file     Inability: Not on file    Transportation needs:     Medical: Not on file     Non-medical: Not on file   Tobacco Use    Smoking status: Current Every Day Smoker     Packs/day: 1.00     Years: 30.00     Pack years: 30.00     Types: Cigarettes     Start date: 12/2/1969    Smokeless tobacco: Never Used   Substance and Sexual Activity    Alcohol use: No    Drug use: No     Types: Cocaine, Heroin     Comment: +Cocaine Screen 2013    Sexual activity: Not on file     Comment: 2014   Lifestyle    Physical activity:     Days per week: Not on file     Minutes per session: Not on file    Stress: Not on file   Relationships    Social connections:     Talks on phone: Not on file     Gets together: Not on file     Attends Rastafari service: Not on file     Active member of club or organization: Not on file     Attends meetings of clubs or organizations: Not on file     Relationship status: Not on file    Intimate partner violence:     Fear of current or ex partner: Not on file     Emotionally abused: Not on file     Physically abused: Not on file     Forced sexual activity: Not on file   Other Topics Concern    Not on file   Social History Narrative    Lives with 15year old son. Blood pressure 104/54, pulse 99, temperature 97.7 °F (36.5 °C), temperature source Oral, resp. rate 18, height 5' 5\" (1.651 m), weight 73.5 kg (162 lb), last menstrual period 11/25/2012, SpO2 93 %. Physical Exam   Constitutional: She is oriented to person, place, and time. She appears well-developed and well-nourished. No distress. HENT:   Head: Normocephalic. Nose: Nose normal.   Mouth/Throat: Oropharynx is clear and moist. No oropharyngeal exudate. Eyes: Pupils are equal, round, and reactive to light. Conjunctivae are normal. Right eye exhibits no discharge. Left eye exhibits no discharge. No scleral icterus. Neck: No JVD present. No tracheal deviation present. No thyromegaly present. Cardiovascular: Normal rate, regular rhythm and intact distal pulses. Exam reveals no gallop. No murmur heard. Pulmonary/Chest: Effort normal. No stridor. No respiratory distress. Wheezes: right mid lung fields. She has no rales. She exhibits no tenderness. Distant breath sounds   Abdominal: Soft. She exhibits no mass. There is no tenderness. There is no rebound. Musculoskeletal: She exhibits no tenderness or deformity. Edema: trace unchanged. Lymphadenopathy:     She has no cervical adenopathy. Neurological: She is alert and oriented to person, place, and time. Coordination normal.   Skin: Skin is warm and dry. No rash noted. She is not diaphoretic. No erythema. No pallor. Psychiatric: She has a normal mood and affect. Her behavior is normal. Judgment and thought content normal.       CT Results (most recent):  Results from Hospital Encounter encounter on 02/06/18   CT CHEST WO CONT    Narrative Noncontrast CT chest    CPT code 89246    CLINICAL HISTORY: Abnormal chest x-ray in a smoker    TECHNIQUE: 5 mm helical MDCT scan of the chest without contrast. Sagittal and  coronal reformations then created with original data set. All CT scans at this  facility are performed using dose optimization techniques as appropriate to a  performed exam, to include automated exposure control, adjustment of the mA  and/or kV according to patient's size (including appropriate matching for site  specific examinations), or use of iterative reconstruction technique. COMPARISON: 7/27/2016, chest x-ray 1/26/2018    FINDINGS: Lungs: Panacinar emphysema upper lobes. Fibrotic scarring left apex. Lung cysts both lower lobes. Honeycombing right lung base, with worsening since  2016. Nayrain Passey 5 mm nodule right lower lobe adjacent to pleura (34) stable since  7/27/2016. Airways: Peribronchial thickening around the elan.      Bony skeleton: Old healed left anterolateral rib fractures; old pseudoarthrosis  right posterior 11th rib fracture. Healing left posterolateral fifth rib  fracture (20), new since prior study. New marked compression deformity T5. Surrounding haziness in the posterior mediastinal fat. No bony spinal stenosis  as a result. Otherwise no loss of vertebral body height. Heart and pericardium: Mild calcifications mitral annulus, left coronary artery. Great vessels: Unremarkable for age. Lymph nodes: Small lymph nodes in the middle mediastinum, not pathologically  enlarged. Pleural space: No effusions. Upper abdomen: Stable well-defined 2.4 cm hypodensity superiorly in the spleen. A 1.4 cm hypodensity inferiorly in the spleen not seen previously, measuring  water attenuation. Remainder included solid organs and hollow viscera are  unremarkable. Impression IMPRESSION: Emphysema and fibrosis at the lung bases, worsened particularly on  the right since 2016. No new infiltrate. 5 mm right lower lobe nodule stable since prior study in 2016, probably benign. New compression fracture T5 and new healing left posterolateral fifth rib  fracture. Splenic cysts including a new one inferiorly. 6 minute walk test: reduced 6 MWD with desaturation relieved by supplemental O2  ASSESSMENT and PLAN  Encounter Diagnoses   Name Primary?  COPD exacerbation (HCC) Yes    COPD, severe ([address]     Hypoxemia requiring supplemental oxygen     Tobacco dependence      Pt with gradual improvement in respiratory status which I believe is partly related to weight loss and better medication adherence. Continue current medications including Anoro and rescue BD. Naoma Broccoli Continue  FiO2 titrated to saturation greater than 90%. Rx for Prednisone taper and Z pack as pt in exacerbation. Strongly counseled on smoking cessation.    RTC 6 months  Full PFT on next visit

## 2019-03-22 NOTE — PROGRESS NOTES
Chief Complaint   Patient presents with    COPD     follow up from 1/31/2019; CXR 2/17/2019    Nicotine Dependence    Other     hypoxemia     1. Have you been to the ER, urgent care clinic since your last visit? Hospitalized since your last visit? Yes When: 2/17/2019 Where: [facility] ED Reason for visit: COPD exacerbation & fracture of right ring finger    2. Have you seen or consulted any other health care providers outside of the [address] [place] [address] since your last visit? Include any pap smears or colon screening.  No

## 2019-03-26 ENCOUNTER — TELEPHONE (OUTPATIENT)
Dept: PULMONOLOGY | Age: 64
End: 2019-03-26

## 2019-03-26 NOTE — TELEPHONE ENCOUNTER
Pt trying to enroll in a Methadone clinic and doctor there wants a letter from her pulmonologist that she can enroll since she has lung issues. Pt is aware Dr. Jc Goodson out of office until next week.

## 2019-03-26 NOTE — TELEPHONE ENCOUNTER
Pt needs a letter from Dr. Sincere Smart so she can start a methadone program.  She states she needs to  letter by 4/2. Pt made aware  won't be back until that day. Please call pt at  once done.

## 2019-03-27 ENCOUNTER — OFFICE VISIT (OUTPATIENT)
Dept: ORTHOPEDIC SURGERY | Facility: CLINIC | Age: 64
End: 2019-03-27

## 2019-03-27 VITALS
SYSTOLIC BLOOD PRESSURE: 127 MMHG | BODY MASS INDEX: 26.96 KG/M2 | RESPIRATION RATE: 16 BRPM | HEART RATE: 72 BPM | HEIGHT: 65 IN | TEMPERATURE: 97.5 F | DIASTOLIC BLOOD PRESSURE: 79 MMHG | OXYGEN SATURATION: 98 %

## 2019-03-27 DIAGNOSIS — S62.644D CLOSED NONDISPLACED FRACTURE OF PROXIMAL PHALANX OF RIGHT RING FINGER WITH ROUTINE HEALING, SUBSEQUENT ENCOUNTER: Primary | ICD-10-CM

## 2019-03-27 NOTE — PROGRESS NOTES
Yola Siddiqui is a 59 y.o. female right handed retiree. Worker's Compensation and legal considerations: not known. Vitals:    03/27/19 1311   BP: 127/79   Pulse: 72   Resp: 16   Temp: 97.5 °F (36.4 °C)   TempSrc: Oral   SpO2: 98%   Height: 5' 5\" (1.651 m)   PainSc:   4   LMP: 11/25/2012           Chief Complaint   Patient presents with    Hand Pain     right ring finger follow up      HPI: Patient comes in today for follow-up 6 weeks status post right ring finger proximal phalanx fracture. She has been in a TKO brace whenever outside of the house but she is been wearing trey straps never inside the house. She denies any pain today. She is been working on her range of motion exercises. She denies any new injury since last time I saw her. Previous HPI: Patient comes in today nearly 4 weeks status post fall and fracture to her right ring finger proximal phalanx. She has been in a TKO brace for the last 2 weeks. She has not had any reinjuries or falls. She reports her pain is almost completely resolved. Initial HPI: Patient comes in today after suffering a fall on Friday, February 15. She went to the emergency department on February 17 and had x-rays done and was diagnosed with a ring finger fracture. She reports the swelling has markedly improved since that visit. She denies any new falls or new injuries since the original.  She has been in a splint since that time. She is also reporting swelling in her middle finger since the injury.       Date of onset: 2/15/2019    Injury: Yes: Comment: Fall    Prior Treatment: TKO brace times 2 weeks    Numbness/ Tingling: No    ROS: Review of Systems - General ROS: negative  Respiratory ROS: no cough, shortness of breath, or wheezing  Cardiovascular ROS: no chest pain or dyspnea on exertion  Musculoskeletal ROS: positive for - pain in finger - right and swelling in finger - right  Neurological ROS: negative  Dermatological ROS: negative    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 congestive heart failure ([facility])     Better controlled     Bipolar affective disorder ([address] 12/5/2012    Cellulitis of right forearm 5/4/2017    Chronic back pain     Chronic lung disease     Chronic obstructive pulmonary disease (COPD) ([facility])     SOB, on paula O2 Recent admission with psychosis     CKD stage G3a/A1, GFR 45-59 and albumin creatinine ratio <30 mg/g ([facility]) 5/11/2017    Urine microalbumin/Creatinine ratio (5/11/2017) = 9 mg/g    Contusion of left elbow 5/4/2017    Depression     Diabetes mellitus ([address]     Diabetic neuropathy associated with type 2 diabetes mellitus ([facility])     Diastolic dysfunction without heart failure     Stable on diuretics     Falls frequently     Gastroesophageal reflux disease with hiatal hernia     Generalized osteoarthritis of multiple sites     Gout     History of acute renal failure 5/31/2013    History of back injury     jumped out of second story window     History of hepatitis C     treated    History of penicillin allergy     History of vitamin D deficiency 5/10/2017    Hyperuricemia 5/26/2017    Hypothyroidism     Hypoxemia requiring supplemental oxygen 12/29/2014    Intravenous drug user 5/2/2017    Memory difficulty     Mixed connective tissue disease ([address] 5/10/2017    Obesity, Class I, BMI 30-34.9     Olecranon bursitis of right elbow 5/4/2017    Recurrent genital herpes 5/31/2013    Right Achilles tendinitis 5/2/2017    Sarcoidosis     Sickle cell trait ([facility])     Type 2 diabetes with stage 3 chronic kidney disease GFR 30-59 ([address]     Urge urinary incontinence 5/10/2017    Venous insufficiency     Wears glasses        Past Surgical History:   Procedure Laterality Date    HX BACK SURGERY  1986    HX  SECTION      HX CYST REMOVAL Left     Left foot    HX OTHER SURGICAL Left 2017    S/P incision and drainage of the abscess of the left hand and the left foot (2017 - Dr. Sanjay Quigley)    HX TUBAL LIGATION         Current Outpatient Medications   Medication Sig Dispense Refill    predniSONE (DELTASONE) 10 mg tablet 30 mg po dailyx 3 days 20 mg po daily x 3 days 10 mg po daily x3 days 18 Tab 0    azithromycin (ZITHROMAX) 250 mg tablet Take 2 tabs po on day one then 1 tab po daily 6 Tab 0    metOLazone (ZAROXOLYN) 2.5 mg tablet Take 1 Tab by mouth every fourty-eight (48) hours. 90 Tab 1    rosuvastatin (CRESTOR) 5 mg tablet TAKE ONE TABLET NIGHTLY 90 Tab 3    aspirin 81 mg chewable tablet Take 1 Tab by mouth daily (with breakfast). 100 Tab 1    colchicine (MITIGARE) 0.6 mg capsule       albuterol (PROAIR HFA) 90 mcg/actuation inhaler INHALE 2 PUFFS EVERY 4 HOURS AS NEEDED FOR WHEEZING 1 Inhaler 5    albuterol (PROVENTIL VENTOLIN) 2.5 mg /3 mL (0.083 %) nebulizer solution USE 1 NEB EVERY 4 HOURS FOR WHEEZING AND SHORTNESS OF BREATH 2 Package 3    umeclidinium-vilanterol (ANORO ELLIPTA) 62.5-25 mcg/actuation inhaler INHALE 1 PUFF DAILY 1 Inhaler 5    oxybutynin (DITROPAN) 5 mg tablet Take 5 mg by mouth two (2) times a day.  allopurinol (ZYLOPRIM) 100 mg tablet Take  by mouth daily.  famotidine (PEPCID) 20 mg tablet Take 1 Tab by mouth daily. Indications: PREVENTION OF STRESS ULCER 15 Tab 0    insulin glargine (LANTUS) 100 unit/mL injection Inject 70 units subcutaneously before breakfast (7AM) and 40 units subcutaneously after dinner (7PM).   Indications: type 2 diabetes mellitus 1 Vial 0    insulin lispro (HUMALOG) 100 unit/mL injection To be given 15 min before meals: < 150 - None, 151-200 - 2 u, 201-250 - 4 u, 251-300 - 6 u, 301-350 - 8 u, 351-400 - 10 u, >400 - 12 u 1 Vial 0    levothyroxine (SYNTHROID) 100 mcg tablet Take 1 Tab by mouth Daily (before breakfast). Indications: hypothyroidism 15 Tab 0    cholecalciferol (VITAMIN D3) 1,000 unit tablet Take 2 Tabs by mouth daily. Indications: PREVENTION OF VITAMIN D DEFICIENCY 30 Tab 0    divalproex DR (DEPAKOTE) 250 mg tablet Take 250 mg by mouth nightly. Indications: BIPOLAR DISORDER IN REMISSION      insulin syringe,safetyneedle 1 mL 30 gauge x 5/16\" syrg As directed 50 Each 0    ARIPiprazole (ABILIFY) 5 mg tablet Take 10 mg by mouth daily. Indications: BIPOLAR DISORDER IN REMISSION      gabapentin (NEURONTIN) 300 mg capsule Take 300 mg by mouth daily. Indications: NEUROPATHIC PAIN      traZODone (DESYREL) 100 mg tablet Take 100 mg by mouth as needed. Indications: major depressive disorder      Nebulizer Accessories Kit Use with nebulizer machine 1 Kit prn    sertraline (ZOLOFT) 100 mg tablet Take 50 mg by mouth daily. Indications: DEPRESSION ASSOCIATED WITH BIPOLAR DISORDER      Oxygen-Air Delivery Systems by Nasal route continuous. 2 LPM via NC  Indications: Hypoxemia requiring supplementary oxygen      lidocaine HCl-me-salicyl-menth 4-49-98 % ktcg 1 Patch by Apply Externally route daily as needed. 1 Box 0    HYDROcodone-acetaminophen (NORCO) 5-325 mg per tablet Take 1 Tab by mouth every four (4) hours as needed for Pain. Max Daily Amount: 6 Tabs. 12 Tab 0       Allergies   Allergen Reactions    Moxifloxacin Rash    Pcn [Penicillins] Swelling    Sulfa (Sulfonamide Antibiotics) Swelling         PE:     Right Hand: Ring finger is nontender to palpation. There is full range of motion of the PIP and DIP digits. There is a 0 to 85-90 degree range of motion of the MCP joint. Palm to pulp distance is less than 1 cm. Imaging:     3 views of the right hand focused on the ring finger shows a healed proximal phalanx base fracture.         ICD-10-CM ICD-9-CM    1. Closed nondisplaced fracture of proximal phalanx of right ring finger with routine healing, subsequent encounter S62.641Z V54.19 AMB POC XRAY, FINGER(S), 2+ VIEWS       Plan:     I told the patient she can stop wearing the TKO brace when she needs it for protection. She may also stop using her trey straps. I told her to continue work on range of motion exercises. Follow-up in 6 weeks for reevaluation and x-rays of the right ring finger.     Plan was reviewed with patient, who verbalized agreement and understanding of the plan

## 2019-04-09 ENCOUNTER — TELEPHONE (OUTPATIENT)
Dept: PULMONOLOGY | Age: 64
End: 2019-04-09

## 2019-04-09 NOTE — TELEPHONE ENCOUNTER
Pt informed per verbal order from Dr. Mira Sanchez she is not able to write a letter for pt to go to Methadone clinic.

## 2019-05-13 ENCOUNTER — APPOINTMENT (OUTPATIENT)
Dept: GENERAL RADIOLOGY | Age: 64
End: 2019-05-13
Attending: PHYSICIAN ASSISTANT
Payer: MEDICAID

## 2019-05-13 ENCOUNTER — HOSPITAL ENCOUNTER (EMERGENCY)
Age: 64
Discharge: HOME | End: 2019-05-14
Attending: EMERGENCY MEDICINE
Payer: MEDICAID

## 2019-05-13 VITALS
RESPIRATION RATE: 18 BRPM | WEIGHT: 162 LBS | HEART RATE: 73 BPM | TEMPERATURE: 99 F | DIASTOLIC BLOOD PRESSURE: 73 MMHG | BODY MASS INDEX: 26.96 KG/M2 | SYSTOLIC BLOOD PRESSURE: 142 MMHG | OXYGEN SATURATION: 97 %

## 2019-05-13 DIAGNOSIS — L02.91 ABSCESS: Primary | ICD-10-CM

## 2019-05-13 LAB
ALBUMIN SERPL-MCNC: 3.5 G/DL (ref 3.4–5)
ALBUMIN/GLOB SERPL: 0.7 {RATIO} (ref 0.8–1.7)
ALP SERPL-CCNC: 68 U/L (ref 45–117)
ALT SERPL-CCNC: 9 U/L (ref 13–56)
ANION GAP SERPL CALC-SCNC: 6 MMOL/L (ref 3–18)
AST SERPL-CCNC: 16 U/L (ref 15–37)
BASOPHILS # BLD: 0 K/UL (ref 0–0.1)
BASOPHILS NFR BLD: 0 % (ref 0–2)
BILIRUB SERPL-MCNC: 0.7 MG/DL (ref 0.2–1)
BUN SERPL-MCNC: 22 MG/DL (ref 7–18)
BUN/CREAT SERPL: 14 (ref 12–20)
CALCIUM SERPL-MCNC: 9.5 MG/DL (ref 8.5–10.1)
CHLORIDE SERPL-SCNC: 94 MMOL/L (ref 100–108)
CO2 SERPL-SCNC: 32 MMOL/L (ref 21–32)
CREAT SERPL-MCNC: 1.53 MG/DL (ref 0.6–1.3)
DIFFERENTIAL METHOD BLD: ABNORMAL
EOSINOPHIL # BLD: 0.1 K/UL (ref 0–0.4)
EOSINOPHIL NFR BLD: 1 % (ref 0–5)
ERYTHROCYTE [DISTWIDTH] IN BLOOD BY AUTOMATED COUNT: 14.7 % (ref 11.6–14.5)
GLOBULIN SER CALC-MCNC: 5.3 G/DL (ref 2–4)
GLUCOSE BLD STRIP.AUTO-MCNC: 159 MG/DL (ref 70–110)
GLUCOSE SERPL-MCNC: 141 MG/DL (ref 74–99)
HCT VFR BLD AUTO: 41.2 % (ref 35–45)
HGB BLD-MCNC: 14 G/DL (ref 12–16)
LACTATE BLD-SCNC: 1.42 MMOL/L (ref 0.4–2)
LACTATE BLD-SCNC: 2.52 MMOL/L (ref 0.4–2)
LYMPHOCYTES # BLD: 2.3 K/UL (ref 0.9–3.6)
LYMPHOCYTES NFR BLD: 23 % (ref 21–52)
MCH RBC QN AUTO: 29.1 PG (ref 24–34)
MCHC RBC AUTO-ENTMCNC: 34 G/DL (ref 31–37)
MCV RBC AUTO: 85.7 FL (ref 74–97)
MONOCYTES # BLD: 0.5 K/UL (ref 0.05–1.2)
MONOCYTES NFR BLD: 5 % (ref 3–10)
NEUTS SEG # BLD: 7 K/UL (ref 1.8–8)
NEUTS SEG NFR BLD: 71 % (ref 40–73)
PLATELET # BLD AUTO: 132 K/UL (ref 135–420)
PMV BLD AUTO: 10.1 FL (ref 9.2–11.8)
POTASSIUM SERPL-SCNC: 3.7 MMOL/L (ref 3.5–5.5)
PROT SERPL-MCNC: 8.8 G/DL (ref 6.4–8.2)
RBC # BLD AUTO: 4.81 M/UL (ref 4.2–5.3)
SODIUM SERPL-SCNC: 132 MMOL/L (ref 136–145)
WBC # BLD AUTO: 9.9 K/UL (ref 4.6–13.2)

## 2019-05-13 PROCEDURE — 80053 COMPREHEN METABOLIC PANEL: CPT

## 2019-05-13 PROCEDURE — 74011250637 HC RX REV CODE- 250/637: Performed by: PHYSICIAN ASSISTANT

## 2019-05-13 PROCEDURE — 83605 ASSAY OF LACTIC ACID: CPT

## 2019-05-13 PROCEDURE — 87040 BLOOD CULTURE FOR BACTERIA: CPT

## 2019-05-13 PROCEDURE — 96367 TX/PROPH/DG ADDL SEQ IV INF: CPT

## 2019-05-13 PROCEDURE — 71045 X-RAY EXAM CHEST 1 VIEW: CPT

## 2019-05-13 PROCEDURE — 75810000289 HC I&D ABSCESS SIMP/COMP/MULT

## 2019-05-13 PROCEDURE — 85025 COMPLETE CBC W/AUTO DIFF WBC: CPT

## 2019-05-13 PROCEDURE — 96365 THER/PROPH/DIAG IV INF INIT: CPT

## 2019-05-13 PROCEDURE — 99284 EMERGENCY DEPT VISIT MOD MDM: CPT

## 2019-05-13 PROCEDURE — 96366 THER/PROPH/DIAG IV INF ADDON: CPT

## 2019-05-13 PROCEDURE — 96361 HYDRATE IV INFUSION ADD-ON: CPT

## 2019-05-13 PROCEDURE — 74011250636 HC RX REV CODE- 250/636: Performed by: PHYSICIAN ASSISTANT

## 2019-05-13 PROCEDURE — 82962 GLUCOSE BLOOD TEST: CPT

## 2019-05-13 RX ORDER — DOXYCYCLINE HYCLATE 100 MG
100 TABLET ORAL 2 TIMES DAILY
Qty: 20 TAB | Refills: 0 | Status: SHIPPED | OUTPATIENT
Start: 2019-05-13 | End: 2019-05-23

## 2019-05-13 RX ORDER — VANCOMYCIN/0.9 % SOD CHLORIDE 1.5G/250ML
1500 PLASTIC BAG, INJECTION (ML) INTRAVENOUS ONCE
Status: COMPLETED | OUTPATIENT
Start: 2019-05-13 | End: 2019-05-13

## 2019-05-13 RX ORDER — ACETAMINOPHEN 325 MG/1
650 TABLET ORAL
Status: COMPLETED | OUTPATIENT
Start: 2019-05-13 | End: 2019-05-13

## 2019-05-13 RX ORDER — VANCOMYCIN HYDROCHLORIDE
1250 EVERY 24 HOURS
Status: DISCONTINUED | OUTPATIENT
Start: 2019-05-14 | End: 2019-05-14 | Stop reason: HOSPADM

## 2019-05-13 RX ORDER — VANCOMYCIN/0.9 % SOD CHLORIDE 1 G/100 ML
1000 PLASTIC BAG, INJECTION (ML) INTRAVENOUS ONCE
Status: DISCONTINUED | OUTPATIENT
Start: 2019-05-13 | End: 2019-05-13

## 2019-05-13 RX ORDER — HYDROCODONE BITARTRATE AND ACETAMINOPHEN 5; 325 MG/1; MG/1
1 TABLET ORAL
Status: COMPLETED | OUTPATIENT
Start: 2019-05-13 | End: 2019-05-13

## 2019-05-13 RX ORDER — SODIUM CHLORIDE 0.9 % (FLUSH) 0.9 %
5-10 SYRINGE (ML) INJECTION AS NEEDED
Status: DISCONTINUED | OUTPATIENT
Start: 2019-05-13 | End: 2019-05-14 | Stop reason: HOSPADM

## 2019-05-13 RX ORDER — LEVOFLOXACIN 5 MG/ML
750 INJECTION, SOLUTION INTRAVENOUS EVERY 24 HOURS
Status: DISCONTINUED | OUTPATIENT
Start: 2019-05-13 | End: 2019-05-14 | Stop reason: HOSPADM

## 2019-05-13 RX ADMIN — SODIUM CHLORIDE 1000 ML: 900 INJECTION, SOLUTION INTRAVENOUS at 21:06

## 2019-05-13 RX ADMIN — ACETAMINOPHEN 650 MG: 325 TABLET ORAL at 16:54

## 2019-05-13 RX ADMIN — VANCOMYCIN HYDROCHLORIDE 1500 MG: 10 INJECTION, POWDER, LYOPHILIZED, FOR SOLUTION INTRAVENOUS at 21:23

## 2019-05-13 RX ADMIN — SODIUM CHLORIDE 1000 ML: 900 INJECTION, SOLUTION INTRAVENOUS at 18:31

## 2019-05-13 RX ADMIN — HYDROCODONE BITARTRATE AND ACETAMINOPHEN 1 TABLET: 5; 325 TABLET ORAL at 23:39

## 2019-05-13 RX ADMIN — LEVOFLOXACIN 750 MG: 5 INJECTION, SOLUTION INTRAVENOUS at 19:43

## 2019-05-13 NOTE — ED TRIAGE NOTES
Pt has an abscess to right forearm. Pt last IVDU was 2wks ago. Pt has hx of copd on 2L NC at home. Denies any chest pain or sob.

## 2019-05-13 NOTE — ED PROVIDER NOTES
EMERGENCY DEPARTMENT HISTORY AND PHYSICAL EXAM 
 
4:35 PM 
 
 
Date: 5/13/2019 Patient Name: Ania Gotti History of Presenting Illness Chief Complaint Patient presents with  Abscess History Provided By: Patient Additional History (Context): Ania Gotti is a 59 y.o. female with hx of IVDA, anxiety, and other noted PMH who presents with complaint of right forearm abscess for 2 weeks. Patient notes progression of symptoms since yesterday. Notes she last shot up that area 2 weeks ago. Denies fever or chills, drainage from site, weakness, or any other medical concerns. Mabeline Sink PCP: Raza Jarquin NP Current Facility-Administered Medications Medication Dose Route Frequency Provider Last Rate Last Dose  levoFLOXacin (LEVAQUIN) 750 mg in D5W IVPB  750 mg IntraVENous Q24H Cecilia Washington  mL/hr at 05/13/19 1943 750 mg at 05/13/19 1943  
 vancomycin (VANCOCIN) 1500 mg in  ml infusion  1,500 mg IntraVENous ONCE Cecilia Washington, [address] 250 mL/hr at 05/13/19 2123 1,500 mg at 05/13/19 2123   Please enter the patient's weight and height into [employer]. Thanks! 1 Each Other ONCE Cecilia Washington PA      
 sodium chloride (NS) flush 5-10 mL  5-10 mL IntraVENous PRN Wilber Washington PA      
 sodium chloride 0.9 % bolus infusion 205 mL  205 mL IntraVENous ONCE Wilber Washington [place] [START ON 5/14/2019] vancomycin (VANCOCIN) 1250 mg in  ml infusion  1,250 mg IntraVENous Q24H Raza Schuler PA-C Current Outpatient Medications Medication Sig Dispense Refill  doxycycline (VIBRA-TABS) 100 mg tablet Take 1 Tab by mouth two (2) times a day for 10 days. 20 Tab 0  predniSONE (DELTASONE) 10 mg tablet 30 mg po dailyx 3 days 20 mg po daily x 3 days 10 mg po daily x3 days 18 Tab 0  
 metOLazone (ZAROXOLYN) 2.5 mg tablet Take 1 Tab by mouth every fourty-eight (48) hours.  90 Tab 1  
  rosuvastatin (CRESTOR) 5 mg tablet TAKE ONE TABLET NIGHTLY 90 Tab 3  
 aspirin 81 mg chewable tablet Take 1 Tab by mouth daily (with breakfast). 100 Tab 1  
 colchicine (MITIGARE) 0.6 mg capsule  lidocaine HCl-me-salicyl-menth 9-82-31 % ktcg 1 Patch by Apply Externally route daily as needed. 1 Box 0  
 HYDROcodone-acetaminophen (NORCO) 5-325 mg per tablet Take 1 Tab by mouth every four (4) hours as needed for Pain. Max Daily Amount: 6 Tabs. 12 Tab 0  
 albuterol (PROAIR HFA) 90 mcg/actuation inhaler INHALE 2 PUFFS EVERY 4 HOURS AS NEEDED FOR WHEEZING 1 Inhaler 5  
 albuterol (PROVENTIL VENTOLIN) 2.5 mg /3 mL (0.083 %) nebulizer solution USE 1 NEB EVERY 4 HOURS FOR WHEEZING AND SHORTNESS OF BREATH 2 Package 3  
 umeclidinium-vilanterol (ANORO ELLIPTA) 62.5-25 mcg/actuation inhaler INHALE 1 PUFF DAILY 1 Inhaler 5  
 oxybutynin (DITROPAN) 5 mg tablet Take 5 mg by mouth two (2) times a day.  allopurinol (ZYLOPRIM) 100 mg tablet Take  by mouth daily.  famotidine (PEPCID) 20 mg tablet Take 1 Tab by mouth daily. Indications: PREVENTION OF STRESS ULCER 15 Tab 0  
 insulin glargine (LANTUS) 100 unit/mL injection Inject 70 units subcutaneously before breakfast (7AM) and 40 units subcutaneously after dinner (7PM). Indications: type 2 diabetes mellitus 1 Vial 0  
 insulin lispro (HUMALOG) 100 unit/mL injection To be given 15 min before meals: < 150 - None, 151-200 - 2 u, 201-250 - 4 u, 251-300 - 6 u, 301-350 - 8 u, 351-400 - 10 u, >400 - 12 u 1 Vial 0  
 levothyroxine (SYNTHROID) 100 mcg tablet Take 1 Tab by mouth Daily (before breakfast). Indications: hypothyroidism 15 Tab 0  cholecalciferol (VITAMIN D3) 1,000 unit tablet Take 2 Tabs by mouth daily. Indications: PREVENTION OF VITAMIN D DEFICIENCY 30 Tab 0  
 divalproex DR (DEPAKOTE) 250 mg tablet Take 250 mg by mouth nightly. Indications: BIPOLAR DISORDER IN REMISSION  insulin syringe,safetyneedle 1 mL 30 gauge x 5/16\" syrg As directed 50 Each 0  
 ARIPiprazole (ABILIFY) 5 mg tablet Take 10 mg by mouth daily. Indications: BIPOLAR DISORDER IN REMISSION    
 gabapentin (NEURONTIN) 300 mg capsule Take 300 mg by mouth daily. Indications: NEUROPATHIC PAIN    
 traZODone (DESYREL) 100 mg tablet Take 100 mg by mouth as needed. Indications: major depressive disorder  Nebulizer Accessories Kit Use with nebulizer machine 1 Kit prn  sertraline (ZOLOFT) 100 mg tablet Take 50 mg by mouth daily. Indications: DEPRESSION ASSOCIATED WITH BIPOLAR DISORDER    
 Oxygen-Air Delivery Systems by Nasal route continuous. 2 LPM via NC  Indications: Hypoxemia requiring supplementary oxygen Past History Past Medical History: 
Past Medical History:  
Diagnosis Date  Abnormally low high density lipoprotein (HDL) cholesterol with hypertriglyceridemia Lipid profile (11/6/2016) showed , , HDL 38, LDL 37  
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 congestive heart failure ([address] Better controlled  Bipolar affective disorder ([facility] 12/5/2012  Cellulitis of right forearm 5/4/2017  Chronic back pain  Chronic lung disease  Chronic obstructive pulmonary disease (COPD) ([address] SOB, on [place] O2 Recent admission with psychosis  CKD stage G3a/A1, GFR 45-59 and albumin creatinine ratio <30 mg/g ([facility]) 5/11/2017 Urine microalbumin/Creatinine ratio (5/11/2017) = 9 mg/g  Contusion of left elbow 5/4/2017  Depression  Diabetes mellitus ([address]  Diabetic neuropathy associated with type 2 diabetes mellitus ([address]  Diastolic dysfunction without heart failure Stable on diuretics  Falls frequently  Gastroesophageal reflux disease with hiatal hernia  Generalized osteoarthritis of multiple sites  Gout  History of acute renal failure 5/31/2013  History of back injury   
 jumped out of second story window  History of hepatitis C   
 treated  History of penicillin allergy  History of vitamin D deficiency 5/10/2017  Hyperuricemia 2017  Hypothyroidism  Hypoxemia requiring supplemental oxygen 2014  Intravenous drug user 2017  Memory difficulty  Mixed connective tissue disease ([facility] 5/10/2017  Obesity, Class I, BMI 30-34.9  Olecranon bursitis of right elbow 2017  Recurrent genital herpes 2013  Right Achilles tendinitis 2017  Sarcoidosis  Sickle cell trait ([facility]  Type 2 diabetes with stage 3 chronic kidney disease GFR 30-59 ([facility])  Urge urinary incontinence 5/10/2017  Venous insufficiency  Wears glasses Past Surgical History: 
Past Surgical History:  
[facility]  [facility]  SECTION    
 HX CYST REMOVAL Left  Left foot  HX OTHER SURGICAL Left 2017 S/P incision and drainage of the abscess of the left hand and the left foot (2017 - Dr. Carter Salinas. Glorious Boot)  HX TUBAL LIGATION Family History: 
Family History Problem Relation Age of Onset  Seizures Other  Diabetes Mother  Hypertension Mother  Heart Disease Mother  Cancer Mother  Diabetes Father  Stroke Father  Heart Disease Father  Headache Sister  Depression Neg Hx  Suicide Neg Hx  Psychotic Disorder Neg Hx  Substance Abuse Neg Hx  Dementia Neg Hx Social History: 
Social History Tobacco Use  Smoking status: Current Every Day Smoker Packs/day: 1.00 Years: 30.00 Pack years: 30.00 Types: Cigarettes Start date: 1969  Smokeless tobacco: Never Used Substance Use Topics  Alcohol use: No  
 Drug use: No  
  Types: Cocaine, Heroin Comment: +Cocaine Screen  Allergies: Allergies Allergen Reactions  Moxifloxacin Rash  Pcn [Penicillins] Swelling  Sulfa (Sulfonamide Antibiotics) Swelling Review of Systems Review of Systems Constitutional: Negative for chills and fever. Respiratory: Negative for shortness of breath. Cardiovascular: Negative for chest pain. Gastrointestinal: Negative for abdominal pain, nausea and vomiting. Skin: Positive for wound. Negative for rash. Neurological: Negative for weakness. All other systems reviewed and are negative. Physical Exam  
 
Visit Vitals /70 (BP 1 Location: Left arm, BP Patient Position: At rest) Pulse 97 Temp 100.1 °F (37.8 °C) Resp 18 Wt 73.5 kg (162 lb) LMP 11/25/2012 (Exact Date) SpO2 97% BMI 26.96 kg/m² Physical Exam  
Constitutional: She appears well-developed and well-nourished. No distress. HENT:  
Head: Normocephalic and atraumatic. Neck: Normal range of motion. Neck supple. Cardiovascular: Normal rate, regular rhythm, normal heart sounds and intact distal pulses. Exam reveals no gallop and no friction rub. No murmur heard. Pulmonary/Chest: Effort normal and breath sounds normal. No respiratory distress. She has no wheezes. She has no rales. Musculoskeletal: Normal range of motion. Neurological: She is alert. Skin: Skin is warm. No rash noted. She is not diaphoretic. Nursing note and vitals reviewed. Diagnostic Study Results Labs - Recent Results (from the past 12 hour(s)) CBC WITH AUTOMATED DIFF Collection Time: 05/13/19  6:02 PM  
Result Value Ref Range WBC 9.9 4.6 - 13.2 K/uL  
 RBC 4.81 4.20 - 5.30 M/uL  
 HGB 14.0 12.0 - 16.0 g/dL HCT 41.2 35.0 - 45.0 % MCV 85.7 74.0 - 97.0 FL  
 MCH 29.1 24.0 - 34.0 PG  
 MCHC 34.0 31.0 - 37.0 g/dL  
 RDW 14.7 (H) 11.6 - 14.5 % PLATELET 575 (L) 276 - 420 K/uL MPV 10.1 9.2 - 11.8 FL  
 NEUTROPHILS 71 40 - 73 % LYMPHOCYTES 23 21 - 52 % MONOCYTES 5 3 - 10 % EOSINOPHILS 1 0 - 5 % BASOPHILS 0 0 - 2 %  
 ABS. NEUTROPHILS 7.0 1.8 - 8.0 K/UL  
 ABS. LYMPHOCYTES 2.3 0.9 - 3.6 K/UL  
 ABS. MONOCYTES 0.5 0.05 - 1.2 K/UL ABS. EOSINOPHILS 0.1 0.0 - 0.4 K/UL  
 ABS. BASOPHILS 0.0 0.0 - 0.1 K/UL  
 DF AUTOMATED METABOLIC PANEL, COMPREHENSIVE Collection Time: 05/13/19  6:02 PM  
Result Value Ref Range Sodium 132 (L) 136 - 145 mmol/L Potassium 3.7 3.5 - 5.5 mmol/L Chloride 94 (L) 100 - 108 mmol/L  
 CO2 32 21 - 32 mmol/L Anion gap 6 3.0 - 18 mmol/L Glucose 141 (H) 74 - 99 mg/dL BUN 22 (H) 7.0 - 18 MG/DL Creatinine 1.53 (H) 0.6 - 1.3 MG/DL  
 BUN/Creatinine ratio 14 12 - 20 GFR est AA 42 (L) >60 ml/min/1.73m2 GFR est non-AA 34 (L) >60 ml/min/1.73m2 Calcium 9.5 8.5 - 10.1 MG/DL Bilirubin, total 0.7 0.2 - 1.0 MG/DL  
 ALT (SGPT) 9 (L) 13 - 56 U/L  
 AST (SGOT) 16 15 - 37 U/L Alk. phosphatase 68 45 - 117 U/L Protein, total 8.8 (H) 6.4 - 8.2 g/dL Albumin 3.5 3.4 - 5.0 g/dL Globulin 5.3 (H) 2.0 - 4.0 g/dL A-G Ratio 0.7 (L) 0.8 - 1.7 GLUCOSE, POC Collection Time: 05/13/19  6:06 PM  
Result Value Ref Range Glucose (POC) 159 (H) 70 - 110 mg/dL POC LACTIC ACID Collection Time: 05/13/19  6:13 PM  
Result Value Ref Range Lactic Acid (POC) 2.52 (HH) 0.40 - 2.00 mmol/L POC LACTIC ACID Collection Time: 05/13/19  9:19 PM  
Result Value Ref Range Lactic Acid (POC) 1.42 0.40 - 2.00 mmol/L Radiologic Studies -  
XR CHEST PORT    (Results Pending) Medical Decision Making I am the first provider for this patient. I reviewed the vital signs, available nursing notes, past medical history, past surgical history, family history and social history. Vital Signs-Reviewed the patient's vital signs. Records Reviewed: Nursing Notes and Old Medical Records (Time of Review: 4:35 PM) ED Course: Progress Notes, Reevaluation, and Consults: PROGRESS NOTE: 
6:12 PM  
Patient care will be transferred to [facility]. Discussed available diagnostic results and care plan at length. Pending labs and reassessment.  
Written by Juanita Manuel PA-C 
 
 PROGRESS NOTE: 
8:37 PM 
Assumed care of patient at 6 PM.  Awaiting completion of fluids and reassessment of lactic acid. Her white blood cell count is normal with no left shift. She is well-appearing. She does not appear septic. Localized infection to forearm, I&D completed by Nash Hernandez. Raza Schuler PA-C  
 
9:46 PM 
Lactic is normal after fluids. No white count, does not appear septic. Blood cultures pending. I feel that with the I&D she is appropriate for outpatient follow-up with oral antibiotics. Prescribe doxycycline and recommended follow-up in 2 days with primary care which patient confirms she will do. To return if symptoms worsen. Discussed treatment plan, return precautions, symptomatic relief, and expected time to improvement. All questions answered. Patient is stable for discharge and outpatient management. I&D Abcess Complex Date/Time: 5/13/2019 5:16 PM 
Performed by: ANRDE Arredondo Authorized by: ANDRE Arredondo Consent:  
  Consent obtained:  Verbal and written Consent given by:  Patient Risks discussed:  Bleeding, incomplete drainage, pain, infection and damage to other organs Alternatives discussed:  No treatment Location:  
  Type:  Abscess Size:  5cm Location:  Upper extremity Upper extremity location:  Arm Arm location:  R lower arm Pre-procedure details:  
  Skin preparation:  Betadine Anesthesia (see MAR for exact dosages): Anesthesia method:  Local infiltration Local anesthetic:  Lidocaine 1% w/o epi Procedure type:  
  Complexity:  Complex Procedure details:  
  Incision types:  Single straight Incision depth:  Subcutaneous Scalpel blade:  11 Wound management:  Probed and deloculated and irrigated with saline Drainage:  Purulent Drainage amount:  Copious Wound treatment:  Wound left open Packing materials:  None Post-procedure details: Patient tolerance of procedure: Tolerated well, no immediate complications Diagnosis Clinical Impression: 1. Abscess Disposition: TBD Follow-up Information Follow up With Specialties Details Why Contact Info [facility] EMERGENCY DEPT Emergency Medicine  If symptoms worsen [address] [id number] 
[phone] Governor SOREN Villalta Nurse Practitioner In 2 days For wound re-check [address] [facility] [address] [phone] Patient's Medications Start Taking DOXYCYCLINE (VIBRA-TABS) 100 MG TABLET    Take 1 Tab by mouth two (2) times a day for 10 days. Continue Taking ALBUTEROL (PROAIR HFA) 90 MCG/ACTUATION INHALER    INHALE 2 PUFFS EVERY 4 HOURS AS NEEDED FOR WHEEZING  
 ALBUTEROL (PROVENTIL VENTOLIN) 2.5 MG /3 ML (0.083 %) NEBULIZER SOLUTION    USE 1 NEB EVERY 4 HOURS FOR WHEEZING AND SHORTNESS OF BREATH ALLOPURINOL (ZYLOPRIM) 100 MG TABLET    Take  by mouth daily. ARIPIPRAZOLE (ABILIFY) 5 MG TABLET    Take 10 mg by mouth daily. Indications: BIPOLAR DISORDER IN REMISSION  
 ASPIRIN 81 MG CHEWABLE TABLET    Take 1 Tab by mouth daily (with breakfast). CHOLECALCIFEROL (VITAMIN D3) 1,000 UNIT TABLET    Take 2 Tabs by mouth daily. Indications: PREVENTION OF VITAMIN D DEFICIENCY  
 COLCHICINE (MITIGARE) 0.6 MG CAPSULE DIVALPROEX DR (DEPAKOTE) 250 MG TABLET    Take 250 mg by mouth nightly. Indications: BIPOLAR DISORDER IN REMISSION  
 FAMOTIDINE (PEPCID) 20 MG TABLET    Take 1 Tab by mouth daily. Indications: PREVENTION OF STRESS ULCER  
 GABAPENTIN (NEURONTIN) 300 MG CAPSULE    Take 300 mg by mouth daily. Indications: NEUROPATHIC PAIN  
 HYDROCODONE-ACETAMINOPHEN (NORCO) 5-325 MG PER TABLET    Take 1 Tab by mouth every four (4) hours as needed for Pain. Max Daily Amount: 6 Tabs.   
 INSULIN GLARGINE (LANTUS) 100 UNIT/ML INJECTION    Inject 70 units subcutaneously before breakfast (7AM) and 40 units subcutaneously after dinner (7PM). Indications: type 2 diabetes mellitus INSULIN LISPRO (HUMALOG) 100 UNIT/ML INJECTION    To be given 15 min before meals: < 150 - None, 151-200 - 2 u, 201-250 - 4 u, 251-300 - 6 u, 301-350 - 8 u, 351-400 - 10 u, >400 - 12 u INSULIN SYRINGE,SAFETYNEEDLE 1 ML 30 GAUGE X 5/16\" SYRG    As directed LEVOTHYROXINE (SYNTHROID) 100 MCG TABLET    Take 1 Tab by mouth Daily (before breakfast). Indications: hypothyroidism LIDOCAINE HCL-ME-SALICYL-MENTH 3-81-90 % KTCG    1 Patch by Apply Externally route daily as needed. METOLAZONE (ZAROXOLYN) 2.5 MG TABLET    Take 1 Tab by mouth every fourty-eight (48) hours. NEBULIZER ACCESSORIES KIT    Use with nebulizer machine OXYBUTYNIN (DITROPAN) 5 MG TABLET    Take 5 mg by mouth two (2) times a day. OXYGEN-AIR DELIVERY SYSTEMS    by Nasal route continuous. 2 LPM via NC  Indications: Hypoxemia requiring supplementary oxygen PREDNISONE (DELTASONE) 10 MG TABLET    30 mg po dailyx 3 days 20 mg po daily x 3 days 10 mg po daily x3 days ROSUVASTATIN (CRESTOR) 5 MG TABLET    TAKE ONE TABLET NIGHTLY SERTRALINE (ZOLOFT) 100 MG TABLET    Take 50 mg by mouth daily. Indications: DEPRESSION ASSOCIATED WITH BIPOLAR DISORDER  
 TRAZODONE (DESYREL) 100 MG TABLET    Take 100 mg by mouth as needed. Indications: major depressive disorder UMECLIDINIUM-VILANTEROL (ANORO ELLIPTA) 62.5-25 MCG/ACTUATION INHALER    INHALE 1 PUFF DAILY These Medications have changed No medications on file Stop Taking AZITHROMYCIN (ZITHROMAX) 250 MG TABLET    Take 2 tabs po on day one then 1 tab po daily Dictation disclaimer:  Please note that this dictation was completed with [employer], the [employer] voice recognition software. Quite often unanticipated grammatical, syntax, homophones, and other interpretive errors are inadvertently transcribed by the computer software. Please disregard these errors. Please excuse any errors that have escaped final proofreading.

## 2019-05-13 NOTE — DISCHARGE INSTRUCTIONS
Patient Education      Take medication as prescribed. Follow-up with your primary care physician in 2 days for reassessment. Bring the results from this visit with you for their review. Return to the ED immediately for any new, worsening, or persistent symptoms, including fever, vomiting, increased swelling/drainage, or any other medical concerns. Skin Abscess: Care Instructions  Your Care Instructions    A skin abscess is a bacterial infection that forms a pocket of pus. A boil is a kind of skin abscess. The doctor may have cut an opening in the abscess so that the pus can drain out. You may have gauze in the cut so that the abscess will stay open and keep draining. You may need antibiotics. You will need to follow up with your doctor to make sure the infection has gone away. The doctor has checked you carefully, but problems can develop later. If you notice any problems or new symptoms, get medical treatment right away. Follow-up care is a key part of your treatment and safety. Be sure to make and go to all appointments, and call your doctor if you are having problems. It's also a good idea to know your test results and keep a list of the medicines you take. How can you care for yourself at home? · Apply warm and dry compresses, a heating pad set on low, or a hot water bottle 3 or 4 times a day for pain. Keep a cloth between the heat source and your skin. · If your doctor prescribed antibiotics, take them as directed. Do not stop taking them just because you feel better. You need to take the full course of antibiotics. · Take pain medicines exactly as directed. ? If the doctor gave you a prescription medicine for pain, take it as prescribed. ? If you are not taking a prescription pain medicine, ask your doctor if you can take an over-the-counter medicine. · Keep your bandage clean and dry. Change the bandage whenever it gets wet or dirty, or at least one time a day.   · If the abscess was packed with gauze:  ? Keep follow-up appointments to have the gauze changed or removed. If the doctor instructed you to remove the gauze, follow the instructions you were given for how to remove it. ? After the gauze is removed, soak the area in warm water for 15 to 20 minutes 2 times a day, until the wound closes. When should you call for help? Call your doctor now or seek immediate medical care if:    · You have signs of worsening infection, such as:  ? Increased pain, swelling, warmth, or redness. ? Red streaks leading from the infected skin. ? Pus draining from the wound. ? A fever.    Watch closely for changes in your health, and be sure to contact your doctor if:    · You do not get better as expected. Where can you learn more? Go to http://rick-kathya.info/. Enter P895 in the search box to learn more about \"Skin Abscess: Care Instructions. \"  Current as of: April 17, 2018  Content Version: 11.9  © 4215-8315 [employer]. Care instructions adapted under license by [employer] (which disclaims liability or warranty for this information). If you have questions about a medical condition or this instruction, always ask your healthcare professional. [address] any warranty or liability for your use of this information.

## 2019-06-05 ENCOUNTER — HOSPITAL ENCOUNTER (OUTPATIENT)
Dept: LAB | Age: 64
Discharge: HOME | End: 2019-06-05

## 2019-06-05 PROCEDURE — 99001 SPECIMEN HANDLING PT-LAB: CPT

## 2019-06-07 ENCOUNTER — TELEPHONE (OUTPATIENT)
Dept: PULMONOLOGY | Age: 64
End: 2019-06-07

## 2019-06-07 LAB — XX-LABCORP SPECIMEN COL,LCBCF: NORMAL

## 2019-06-07 NOTE — TELEPHONE ENCOUNTER
Pt states she has been wheezing more the past week due to weather changes. Sputum she is getting up is white in color. No fever. Pt has not been using her nebulizer treatments. Pt to use the albuterol nebulizer treatments when wheezing. If sxs persist or worsen will go to urgent care if weekend or call our office for appt.  Pt verbalizes understanding of plan of care

## 2019-06-07 NOTE — TELEPHONE ENCOUNTER
Per pt she is coughing up phlegm and the wall of left side of lung hurts. She wants prednisone. Please call . Pt states she uses [facility] at [phone].

## 2019-06-10 ENCOUNTER — TELEPHONE (OUTPATIENT)
Dept: PULMONOLOGY | Age: 64
End: 2019-06-10

## 2019-06-10 NOTE — TELEPHONE ENCOUNTER
PT CALLED([phone]). PT STILL COUGHING UP GREEN PHLEGM. THINKS SHE MAY NEED ANTIBIOTIC. PLEASE CHECK AND CALL HER BACK.

## 2019-06-11 RX ORDER — AZITHROMYCIN 250 MG/1
TABLET, FILM COATED ORAL
Qty: 6 TAB | Refills: 0 | Status: SHIPPED | OUTPATIENT
Start: 2019-06-11 | End: 2019-01-01

## 2019-06-11 RX ORDER — PREDNISONE 10 MG/1
TABLET ORAL
Qty: 18 TAB | Refills: 0 | Status: SHIPPED | OUTPATIENT
Start: 2019-06-11 | End: 2019-01-01

## 2019-07-10 ENCOUNTER — HOSPITAL ENCOUNTER (EMERGENCY)
Age: 64
Discharge: HOME | End: 2019-07-11
Attending: EMERGENCY MEDICINE
Payer: MEDICAID

## 2019-07-10 VITALS
HEART RATE: 92 BPM | DIASTOLIC BLOOD PRESSURE: 82 MMHG | OXYGEN SATURATION: 98 % | SYSTOLIC BLOOD PRESSURE: 137 MMHG | TEMPERATURE: 98.4 F | RESPIRATION RATE: 19 BRPM

## 2019-07-10 DIAGNOSIS — L03.113 CELLULITIS OF ARM, RIGHT: Primary | ICD-10-CM

## 2019-07-10 DIAGNOSIS — L03.113 CELLULITIS OF RIGHT FOREARM: ICD-10-CM

## 2019-07-10 PROCEDURE — 74011000250 HC RX REV CODE- 250: Performed by: PHYSICIAN ASSISTANT

## 2019-07-10 PROCEDURE — 75810000289 HC I&D ABSCESS SIMP/COMP/MULT

## 2019-07-10 PROCEDURE — 74011250637 HC RX REV CODE- 250/637: Performed by: PHYSICIAN ASSISTANT

## 2019-07-10 PROCEDURE — 99283 EMERGENCY DEPT VISIT LOW MDM: CPT

## 2019-07-10 PROCEDURE — 74011250636 HC RX REV CODE- 250/636: Performed by: PHYSICIAN ASSISTANT

## 2019-07-10 PROCEDURE — 96372 THER/PROPH/DIAG INJ SC/IM: CPT

## 2019-07-10 RX ORDER — HYDROCODONE BITARTRATE AND ACETAMINOPHEN 5; 325 MG/1; MG/1
1 TABLET ORAL
Status: COMPLETED | OUTPATIENT
Start: 2019-07-10 | End: 2019-07-10

## 2019-07-10 RX ORDER — HYDROCODONE BITARTRATE AND ACETAMINOPHEN 5; 325 MG/1; MG/1
1 TABLET ORAL
Qty: 18 TAB | Refills: 0 | Status: SHIPPED | OUTPATIENT
Start: 2019-07-10 | End: 2019-07-13

## 2019-07-10 RX ORDER — CLINDAMYCIN HYDROCHLORIDE 150 MG/1
450 CAPSULE ORAL 3 TIMES DAILY
Qty: 90 CAP | Refills: 0 | Status: SHIPPED | OUTPATIENT
Start: 2019-07-10 | End: 2019-07-20

## 2019-07-10 RX ORDER — LIDOCAINE HYDROCHLORIDE AND EPINEPHRINE 10; 10 MG/ML; UG/ML
1.5 INJECTION, SOLUTION INFILTRATION; PERINEURAL ONCE
Status: COMPLETED | OUTPATIENT
Start: 2019-07-10 | End: 2019-07-10

## 2019-07-10 RX ADMIN — LIDOCAINE HYDROCHLORIDE 1 G: 10 INJECTION, SOLUTION EPIDURAL; INFILTRATION; INTRACAUDAL; PERINEURAL at 23:46

## 2019-07-10 RX ADMIN — HYDROCODONE BITARTRATE AND ACETAMINOPHEN 1 TABLET: 5; 325 TABLET ORAL at 23:11

## 2019-07-10 RX ADMIN — LIDOCAINE HYDROCHLORIDE,EPINEPHRINE BITARTRATE 15 MG: 10; .01 INJECTION, SOLUTION INFILTRATION; PERINEURAL at 23:11

## 2019-07-11 NOTE — DISCHARGE INSTRUCTIONS
Patient Education        Cellulitis: Care Instructions  Your Care Instructions    Cellulitis is a skin infection caused by bacteria, most often strep or staph. It often occurs after a break in the skin from a scrape, cut, bite, or puncture, or after a rash. Cellulitis may be treated without doing tests to find out what caused it. But your doctor may do tests, if needed, to look for a specific bacteria, like methicillin-resistant Staphylococcus aureus (MRSA). The doctor has checked you carefully, but problems can develop later. If you notice any problems or new symptoms, get medical treatment right away. Follow-up care is a key part of your treatment and safety. Be sure to make and go to all appointments, and call your doctor if you are having problems. It's also a good idea to know your test results and keep a list of the medicines you take. How can you care for yourself at home? · Take your antibiotics as directed. Do not stop taking them just because you feel better. You need to take the full course of antibiotics. · Prop up the infected area on pillows to reduce pain and swelling. Try to keep the area above the level of your heart as often as you can. · If your doctor told you how to care for your wound, follow your doctor's instructions. If you did not get instructions, follow this general advice:  ? Wash the wound with clean water 2 times a day. Don't use hydrogen peroxide or alcohol, which can slow healing. ? You may cover the wound with a thin layer of petroleum jelly, such as Vaseline, and a nonstick bandage. ? Apply more petroleum jelly and replace the bandage as needed. · Be safe with medicines. Take pain medicines exactly as directed. ? If the doctor gave you a prescription medicine for pain, take it as prescribed. ? If you are not taking a prescription pain medicine, ask your doctor if you can take an over-the-counter medicine.   To prevent cellulitis in the future  · Try to prevent cuts, scrapes, or other injuries to your skin. Cellulitis most often occurs where there is a break in the skin. · If you get a scrape, cut, mild burn, or bite, wash the wound with clean water as soon as you can to help avoid infection. Don't use hydrogen peroxide or alcohol, which can slow healing. · If you have swelling in your legs (edema), support stockings and good skin care may help prevent leg sores and cellulitis. · Take care of your feet, especially if you have diabetes or other conditions that increase the risk of infection. Wear shoes and socks. Do not go barefoot. If you have athlete's foot or other skin problems on your feet, talk to your doctor about how to treat them. When should you call for help? Call your doctor now or seek immediate medical care if:    · You have signs that your infection is getting worse, such as:  ? Increased pain, swelling, warmth, or redness. ? Red streaks leading from the area. ? Pus draining from the area. ? A fever.     · You get a rash.    Watch closely for changes in your health, and be sure to contact your doctor if:    · You do not get better as expected. Where can you learn more? Go to http://rick-kathya.info/. Tod Patterson in the search box to learn more about \"Cellulitis: Care Instructions. \"  Current as of: April 17, 2018  Content Version: 11.9  © 2868-7154 [employer]. Care instructions adapted under license by [employer] (which disclaims liability or warranty for this information). If you have questions about a medical condition or this instruction, always ask your healthcare professional. [address] any warranty or liability for your use of this information.

## 2019-07-11 NOTE — ED PROVIDER NOTES
EMERGENCY DEPARTMENT HISTORY AND PHYSICAL EXAM 
 
Date: 7/10/2019 Patient Name: Sybil Dai History of Presenting Illness Chief Complaint Patient presents with  
 Skin Problem History Provided By: Patient Additional History (Context): Sybil Dai is a 59 y.o. female with Previous history of cellulitis last episode 3 months ago. who presents with edema, erythema and calor of the right forearm for 2 days. The symptoms are consistent with prior abscesses requiring incision and drainage. Patient denies fever chills or myalgias. No drainage from the wound. PCP: Yassine Henson NP Current Facility-Administered Medications Medication Dose Route Frequency Provider Last Rate Last Dose  cefTRIAXone (ROCEPHIN) 1 g in lidocaine (PF) (XYLOCAINE) 10 mg/mL (1 %) IM injection  1 g IntraMUSCular NOW Ana Wang PA Current Outpatient Medications Medication Sig Dispense Refill  
 HYDROcodone-acetaminophen (NORCO) 5-325 mg per tablet Take 1 Tab by mouth every four (4) hours as needed for Pain for up to 3 days. Max Daily Amount: 6 Tabs. 18 Tab 0  
 clindamycin (CLEOCIN) 150 mg capsule Take 3 Caps by mouth three (3) times daily for 10 days. 90 Cap 0  predniSONE (DELTASONE) 10 mg tablet 30 mg po dailyx 3 days 20 mg po daily x 3 days 10 mg po daily x3 days 18 Tab 0  
 azithromycin (ZITHROMAX) 250 mg tablet Take 2 tabs po on day one then 1 tab po daily 6 Tab 0  predniSONE (DELTASONE) 10 mg tablet 30 mg po dailyx 3 days 20 mg po daily x 3 days 10 mg po daily x3 days 18 Tab 0  
 metOLazone (ZAROXOLYN) 2.5 mg tablet Take 1 Tab by mouth every fourty-eight (48) hours. 90 Tab 1  
 rosuvastatin (CRESTOR) 5 mg tablet TAKE ONE TABLET NIGHTLY 90 Tab 3  
 aspirin 81 mg chewable tablet Take 1 Tab by mouth daily (with breakfast). 100 Tab 1  
 colchicine (MITIGARE) 0.6 mg capsule  lidocaine HCl-me-salicyl-menth 2-09-15 % ktcg 1 Patch by Apply Externally route daily as needed. 1 Box 0  
 albuterol (PROAIR HFA) 90 mcg/actuation inhaler INHALE 2 PUFFS EVERY 4 HOURS AS NEEDED FOR WHEEZING 1 Inhaler 5  
 albuterol (PROVENTIL VENTOLIN) 2.5 mg /3 mL (0.083 %) nebulizer solution USE 1 NEB EVERY 4 HOURS FOR WHEEZING AND SHORTNESS OF BREATH 2 Package 3  
 umeclidinium-vilanterol (ANORO ELLIPTA) 62.5-25 mcg/actuation inhaler INHALE 1 PUFF DAILY 1 Inhaler 5  
 oxybutynin (DITROPAN) 5 mg tablet Take 5 mg by mouth two (2) times a day.  allopurinol (ZYLOPRIM) 100 mg tablet Take  by mouth daily.  famotidine (PEPCID) 20 mg tablet Take 1 Tab by mouth daily. Indications: PREVENTION OF STRESS ULCER 15 Tab 0  
 insulin glargine (LANTUS) 100 unit/mL injection Inject 70 units subcutaneously before breakfast (7AM) and 40 units subcutaneously after dinner (7PM). Indications: type 2 diabetes mellitus 1 Vial 0  
 insulin lispro (HUMALOG) 100 unit/mL injection To be given 15 min before meals: < 150 - None, 151-200 - 2 u, 201-250 - 4 u, 251-300 - 6 u, 301-350 - 8 u, 351-400 - 10 u, >400 - 12 u 1 Vial 0  
 levothyroxine (SYNTHROID) 100 mcg tablet Take 1 Tab by mouth Daily (before breakfast). Indications: hypothyroidism 15 Tab 0  cholecalciferol (VITAMIN D3) 1,000 unit tablet Take 2 Tabs by mouth daily. Indications: PREVENTION OF VITAMIN D DEFICIENCY 30 Tab 0  
 divalproex DR (DEPAKOTE) 250 mg tablet Take 250 mg by mouth nightly. Indications: BIPOLAR DISORDER IN REMISSION  insulin syringe,safetyneedle 1 mL 30 gauge x 5/16\" syrg As directed 50 Each 0  
 ARIPiprazole (ABILIFY) 5 mg tablet Take 10 mg by mouth daily. Indications: BIPOLAR DISORDER IN REMISSION    
 gabapentin (NEURONTIN) 300 mg capsule Take 300 mg by mouth daily. Indications: NEUROPATHIC PAIN    
 traZODone (DESYREL) 100 mg tablet Take 100 mg by mouth as needed. Indications: major depressive disorder  Nebulizer Accessories Kit Use with nebulizer machine 1 Kit prn  
  sertraline (ZOLOFT) 100 mg tablet Take 50 mg by mouth daily. Indications: DEPRESSION ASSOCIATED WITH BIPOLAR DISORDER    
 Oxygen-Air Delivery Systems by Nasal route continuous. 2 LPM via NC  Indications: Hypoxemia requiring supplementary oxygen Past History Past Medical History: 
Past Medical History:  
Diagnosis Date  Abnormally low high density lipoprotein (HDL) cholesterol with hypertriglyceridemia Lipid profile (11/6/2016) showed , , HDL 38, LDL 37  
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 congestive heart failure ([address] Better controlled  Bipolar affective disorder ([address] 12/5/2012  Cellulitis of right forearm 5/4/2017  Chronic back pain  Chronic lung disease  Chronic obstructive pulmonary disease (COPD) ([address] SOB, on [place] O2 Recent admission with psychosis  CKD stage G3a/A1, GFR 45-59 and albumin creatinine ratio <30 mg/g ([facility]) 5/11/2017 Urine microalbumin/Creatinine ratio (5/11/2017) = 9 mg/g  Contusion of left elbow 5/4/2017  Depression  Diabetes mellitus ([address]  Diabetic neuropathy associated with type 2 diabetes mellitus ([address]  Diastolic dysfunction without heart failure Stable on diuretics  Falls frequently  Gastroesophageal reflux disease with hiatal hernia  Generalized osteoarthritis of multiple sites  Gout  History of acute renal failure 5/31/2013  History of back injury   
 jumped out of second story window  History of hepatitis C   
 treated  History of penicillin allergy  History of vitamin D deficiency 5/10/2017  Hyperuricemia 5/26/2017  Hypothyroidism  Hypoxemia requiring supplemental oxygen 12/29/2014  Intravenous drug user 5/2/2017  Memory difficulty  Mixed connective tissue disease ([address] 5/10/2017  Obesity, Class I, BMI 30-34.9  Olecranon bursitis of right elbow 5/4/2017  Recurrent genital herpes 2013  Right Achilles tendinitis 2017  Sarcoidosis  Sickle cell trait [facility]  Type 2 diabetes with stage 3 chronic kidney disease GFR 30-59 ([facility])  Urge urinary incontinence 5/10/2017  Venous insufficiency  Wears glasses Past Surgical History: 
Past Surgical History:  
Procedure Laterality Date Toño  HX  SECTION    
 HX CYST REMOVAL Left 1979 Left foot  HX OTHER SURGICAL Left 2017 S/P incision and drainage of the abscess of the left hand and the left foot (2017 - Dr. Jacquelyn Sandoval. Eduardo Wagner)  HX TUBAL LIGATION Family History: 
Family History Problem Relation Age of Onset  Seizures Other  Diabetes Mother  Hypertension Mother  Heart Disease Mother  Cancer Mother  Diabetes Father  Stroke Father  Heart Disease Father  Headache Sister  Depression Neg Hx  Suicide Neg Hx  Psychotic Disorder Neg Hx  Substance Abuse Neg Hx  Dementia Neg Hx Social History: 
Social History Tobacco Use  Smoking status: Current Every Day Smoker Packs/day: 1.00 Years: 30.00 Pack years: 30.00 Types: Cigarettes Start date: 1969  Smokeless tobacco: Never Used Substance Use Topics  Alcohol use: No  
 Drug use: No  
  Types: Cocaine, Heroin Comment: +Cocaine Screen  Allergies: Allergies Allergen Reactions  Moxifloxacin Rash  Pcn [Penicillins] Swelling  Sulfa (Sulfonamide Antibiotics) Swelling Review of Systems Review of Systems Review of Systems Constitutional: Negative for fatigue and fever. HENT: Negative for congestion. Respiratory: Negative for cough and shortness of breath. Cardiovascular: Negative for chest pain. Musculoskeletal: Negative joint pain, joint swelling, recent injury. Skin: edema, calor and erythema of the right forearm. Neurological: Negative for dizziness and headaches. All other systems reviewed and are negative. All Other Systems Negative Physical Exam  
 
Vitals:  
 07/10/19 2126 BP: 137/82 Pulse: 92 Resp: 19 Temp: 98.4 °F (36.9 °C) SpO2: 98% Physical Exam  
 
Constitutional: Pt is oriented to person, place, and time. Pt appears well-developed and well-nourished. HENT:  
Head: Normocephalic and atraumatic. Mouth/Throat: Oropharynx is clear and moist.  
Eyes: Pupils are equal, round, and reactive to light. Right eye exhibits no discharge. Left eye exhibits no discharge. Neck: Normal range of motion. Neck supple. No tracheal deviation present. No thyromegaly present. Cardiovascular: Normal rate, regular rhythm and normal heart sounds. Exam reveals no friction rub. No murmur heard. Pulmonary/Chest: Effort normal and breath sounds normal. No respiratory distress. No wheezes; bilateral wheezing. Musculoskeletal: Normal range of motion. No edema or deformity. Neurological: Pt is alert and oriented to person, place, and time;   
Skin: There is a 14 cm diameter of erythema, edema, calor and fluctuance of the right forearm. No draining lesion is seen. Distal pulses intact Psychiatric: Pt has a normal mood and affect;  behavior is normal. Judgment and thought content normal.  
 
 
 
 
Diagnostic Study Results Labs - No results found for this or any previous visit (from the past 12 hour(s)). Radiologic Studies - No orders to display CT Results  (Last 48 hours) None CXR Results  (Last 48 hours) None Medical Decision Making I am the first provider for this patient. Due to patient's risk factor of diabetes and being a smoker and attempt was made to incise the wound in an effort to drain the area of fluctuance.   I was unable to find a pocket of purulent drainage with 18-gauge needle aspiration or a 2 cm incision with a 15 blade. Patient received 1 g of Rocephin while here. I am discharging her on clindamycin, given the patient is allergic to sulfa. I reviewed the vital signs, available nursing notes, past medical history, past surgical history, family history and social history. Vital Signs-Reviewed the patient's vital signs. Comparison: 
 
Records Reviewed: Nursing Notes Procedures: 
I&D Abcess Simple Date/Time: 7/10/2019 11:48 PM 
Performed by: ANDRE Marie Authorized by: ANDRE Marie Consent:  
  Consent obtained:  Verbal 
  Consent given by:  Patient Risks discussed:  Infection and bleeding Alternatives discussed:  Delayed treatment Location:  
  Type:  Abscess Location:  Upper extremity Upper extremity location:  Arm Arm location:  R lower arm Pre-procedure details:  
  Skin preparation:  Betadine Anesthesia (see MAR for exact dosages): Anesthesia method:  Local infiltration Local anesthetic:  Lidocaine 1% WITH epi Procedure type:  
  Complexity:  Simple Procedure details:  
  Needle aspiration: yes Needle size:  18 G Incision types:  Single straight Incision depth:  Subcutaneous Scalpel blade:  15 Wound management:  Probed and deloculated and irrigated with saline Drainage:  Bloody Drainage amount:  Scant Packing materials:  None Post-procedure details:  
  Patient tolerance of procedure: Tolerated well, no immediate complications Comments:  
   Several attempts to find a pocket with 18g needle were unsucessful. Provider Notes (Medical Decision Making): MED RECONCILIATION: 
Current Facility-Administered Medications Medication Dose Route Frequency  cefTRIAXone (ROCEPHIN) 1 g in lidocaine (PF) (XYLOCAINE) 10 mg/mL (1 %) IM injection  1 g IntraMUSCular NOW Current Outpatient Medications Medication Sig  
 HYDROcodone-acetaminophen (NORCO) 5-325 mg per tablet Take 1 Tab by mouth every four (4) hours as needed for Pain for up to 3 days. Max Daily Amount: 6 Tabs.  clindamycin (CLEOCIN) 150 mg capsule Take 3 Caps by mouth three (3) times daily for 10 days.  predniSONE (DELTASONE) 10 mg tablet 30 mg po dailyx 3 days 20 mg po daily x 3 days 10 mg po daily x3 days  azithromycin (ZITHROMAX) 250 mg tablet Take 2 tabs po on day one then 1 tab po daily  predniSONE (DELTASONE) 10 mg tablet 30 mg po dailyx 3 days 20 mg po daily x 3 days 10 mg po daily x3 days  metOLazone (ZAROXOLYN) 2.5 mg tablet Take 1 Tab by mouth every fourty-eight (48) hours.  rosuvastatin (CRESTOR) 5 mg tablet TAKE ONE TABLET NIGHTLY  aspirin 81 mg chewable tablet Take 1 Tab by mouth daily (with breakfast).  colchicine (MITIGARE) 0.6 mg capsule  lidocaine HCl-me-salicyl-menth 9-16-73 % ktcg 1 Patch by Apply Externally route daily as needed.  albuterol (PROAIR HFA) 90 mcg/actuation inhaler INHALE 2 PUFFS EVERY 4 HOURS AS NEEDED FOR WHEEZING  
 albuterol (PROVENTIL VENTOLIN) 2.5 mg /3 mL (0.083 %) nebulizer solution USE 1 NEB EVERY 4 HOURS FOR WHEEZING AND SHORTNESS OF BREATH  
 umeclidinium-vilanterol (ANORO ELLIPTA) 62.5-25 mcg/actuation inhaler INHALE 1 PUFF DAILY  oxybutynin (DITROPAN) 5 mg tablet Take 5 mg by mouth two (2) times a day.  allopurinol (ZYLOPRIM) 100 mg tablet Take  by mouth daily.  famotidine (PEPCID) 20 mg tablet Take 1 Tab by mouth daily. Indications: PREVENTION OF STRESS ULCER  
 insulin glargine (LANTUS) 100 unit/mL injection Inject 70 units subcutaneously before breakfast (7AM) and 40 units subcutaneously after dinner (7PM). Indications: type 2 diabetes mellitus  insulin lispro (HUMALOG) 100 unit/mL injection To be given 15 min before meals: < 150 - None, 151-200 - 2 u, 201-250 - 4 u, 251-300 - 6 u, 301-350 - 8 u, 351-400 - 10 u, >400 - 12 u  levothyroxine (SYNTHROID) 100 mcg tablet Take 1 Tab by mouth Daily (before breakfast). Indications: hypothyroidism  cholecalciferol (VITAMIN D3) 1,000 unit tablet Take 2 Tabs by mouth daily. Indications: PREVENTION OF VITAMIN D DEFICIENCY  divalproex DR (DEPAKOTE) 250 mg tablet Take 250 mg by mouth nightly. Indications: BIPOLAR DISORDER IN REMISSION  insulin syringe,safetyneedle 1 mL 30 gauge x 5/16\" syrg As directed  ARIPiprazole (ABILIFY) 5 mg tablet Take 10 mg by mouth daily. Indications: BIPOLAR DISORDER IN REMISSION  
 gabapentin (NEURONTIN) 300 mg capsule Take 300 mg by mouth daily. Indications: NEUROPATHIC PAIN  
 traZODone (DESYREL) 100 mg tablet Take 100 mg by mouth as needed. Indications: major depressive disorder  Nebulizer Accessories Kit Use with nebulizer machine  sertraline (ZOLOFT) 100 mg tablet Take 50 mg by mouth daily. Indications: DEPRESSION ASSOCIATED WITH BIPOLAR DISORDER  
 Oxygen-Air Delivery Systems by Nasal route continuous. 2 LPM via NC  Indications: Hypoxemia requiring supplementary oxygen Disposition: 
Home DISCHARGE NOTE:  
 
Pt has been reexamined. There is still no purulent drainage. Patient has no new complaints, changes, or physical findings. Care plan outlined and precautions discussed. Results of exam and procedure were reviewed with the patient. All medications were reviewed with the patient; will d/c home with clindamycin. All of pt's questions and concerns were addressed. Patient was instructed and agrees to follow up with Safia Samson NP, as well as to return to the ED upon further deterioration. Patient is ready to go home. Follow-up Information Follow up With Specialties Details Why Contact Info Jere Lang NP Nurse Practitioner In 2 days  [address] [facility] [id number] [phone] Current Discharge Medication List  
  
START taking these medications Details  
clindamycin (CLEOCIN) 150 mg capsule Take 3 Caps by mouth three (3) times daily for 10 days. Qty: 90 Cap, Refills: 0 CONTINUE these medications which have CHANGED Details HYDROcodone-acetaminophen (NORCO) 5-325 mg per tablet Take 1 Tab by mouth every four (4) hours as needed for Pain for up to 3 days. Max Daily Amount: 6 Tabs. Qty: 18 Tab, Refills: 0 Associated Diagnoses: Cellulitis of right forearm Diagnosis Clinical Impression: 1. Cellulitis of arm, right 2. Cellulitis of right forearm

## 2019-07-11 NOTE — ED NOTES
Area to right forearm dressed. I have reviewed discharge instructions with the patient. The patient verbalized understanding. Patient armband removed and given to patient to take home. Patient was informed of the privacy risks if armband lost or stolen.

## 2019-08-22 ENCOUNTER — HOSPITAL ENCOUNTER (OUTPATIENT)
Dept: MAMMOGRAPHY | Age: 64
Discharge: HOME | End: 2019-08-22
Payer: MEDICAID

## 2019-08-22 DIAGNOSIS — Z12.39 SCREENING FOR BREAST CANCER: ICD-10-CM

## 2019-08-22 PROCEDURE — 77067 SCR MAMMO BI INCL CAD: CPT

## 2019-10-09 NOTE — PROGRESS NOTES
1. Have you been to the ER, urgent care clinic since your last visit? Hospitalized since your last visit? Yes When: 07/2019 Where: [address] Reason for visit: Cellulitis    2. Have you seen or consulted any other health care providers outside of the [address] [place] [address] since your last visit? Include any pap smears or colon screening.  No

## 2019-10-09 NOTE — PATIENT INSTRUCTIONS

## 2019-10-09 NOTE — PROGRESS NOTES
HISTORY OF PRESENT ILLNESS  Maria Guillen is a 62 y.o. female. 2019-recent emergency room visit after a fall and had fracture of finger currently has a brace. CHF   The history is provided by the patient. This is a chronic problem. The problem occurs constantly. The problem has not changed since onset. Associated symptoms include shortness of breath. Pertinent negatives include no chest pain. The symptoms are aggravated by exertion. The symptoms are relieved by rest.   Cholesterol Problem   The history is provided by the patient. This is a chronic problem. The problem occurs constantly. The problem has not changed since onset. Associated symptoms include shortness of breath. Pertinent negatives include no chest pain. Hypertension   The history is provided by the patient. This is a chronic problem. The problem occurs constantly. The problem has not changed since onset. Associated symptoms include shortness of breath. Pertinent negatives include no chest pain. Nothing aggravates the symptoms. Shortness of Breath   The history is provided by the patient. This is a recurrent problem. The problem occurs frequently. The problem has been gradually improving. Associated symptoms include leg swelling. Pertinent negatives include no fever, no cough, no sputum production, no hemoptysis, no wheezing, no PND, no orthopnea, no chest pain, no vomiting, no rash and no claudication. Associated medical issues include COPD and heart failure. Leg Swelling   The history is provided by the patient. This is a recurrent problem. The problem occurs every several days. Associated symptoms include shortness of breath. Pertinent negatives include no chest pain. Review of Systems   Constitutional: Negative for chills and fever. HENT: Negative for nosebleeds. Eyes: Negative for blurred vision and double vision. Respiratory: Positive for shortness of breath. Negative for cough, hemoptysis, sputum production and wheezing. Cardiovascular: Positive for leg swelling. Negative for chest pain, palpitations, orthopnea, claudication and PND. Gastrointestinal: Negative for heartburn, nausea and vomiting. Musculoskeletal: Negative for myalgias. Skin: Negative for rash. Neurological: Negative for dizziness and weakness. Endo/Heme/Allergies: Does not bruise/bleed easily.      Family History   Problem Relation Age of Onset    Seizures Other     Diabetes Mother     Hypertension Mother     Heart Disease Mother     Cancer Mother     Diabetes Father     Stroke Father     Heart Disease Father     Headache Sister     Depression Neg Hx     Suicide Neg Hx     Psychotic Disorder Neg Hx     Substance Abuse Neg Hx     Dementia Neg Hx        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 congestive heart failure (HCC)     Better controlled     Bipolar affective disorder ([address] 12/5/2012    Cellulitis of right forearm 5/4/2017    Chronic back pain     Chronic lung disease     Chronic obstructive pulmonary disease (COPD) ([facility])     SOB, on paula O2 Recent admission with psychosis     CKD stage G3a/A1, GFR 45-59 and albumin creatinine ratio <30 mg/g ([facility]) 5/11/2017    Urine microalbumin/Creatinine ratio (5/11/2017) = 9 mg/g    Contusion of left elbow 5/4/2017    Depression     Diabetes mellitus ([address]     Diabetic neuropathy associated with type 2 diabetes mellitus ([address]     Diastolic dysfunction without heart failure     Stable on diuretics     Falls frequently     Gastroesophageal reflux disease with hiatal hernia     Generalized osteoarthritis of multiple sites     Gout     History of acute renal failure 5/31/2013    History of back injury     jumped out of second story window     History of hepatitis C     treated    History of penicillin allergy     History of vitamin D deficiency 5/10/2017    Hyperuricemia 2017    Hypothyroidism     Hypoxemia requiring supplemental oxygen 2014    Intravenous drug user 2017    Memory difficulty     Mixed connective tissue disease ([facility] 5/10/2017    Obesity, Class I, BMI 30-34.9     Olecranon bursitis of right elbow 2017    Recurrent genital herpes 2013    Right Achilles tendinitis 2017    Sarcoidosis     Sickle cell trait (HCC)     Type 2 diabetes with stage 3 chronic kidney disease GFR 30-59 (HCC)     Urge urinary incontinence 5/10/2017    Venous insufficiency     Wears glasses        Past Surgical History:   Procedure Laterality Date    HX BACK SURGERY      HX  SECTION      HX CYST REMOVAL Left     Left foot    HX OTHER SURGICAL Left 2017    S/P incision and drainage of the abscess of the left hand and the left foot (2017 - Dr. Hoang Gutiérrez. Jaquelin Sinclair)    HX TUBAL LIGATION         Allergies   Allergen Reactions    Moxifloxacin Rash    Pcn [Penicillins] Swelling    Sulfa (Sulfonamide Antibiotics) Swelling       Current Outpatient Medications   Medication Sig    traMADol (ULTRAM) 50 mg tablet Take 50 mg by mouth daily.  acetaminophen (TYLENOL) 325 mg tablet Take  by mouth every four (4) hours as needed for Pain.  famotidine (PEPCID) 20 mg tablet Take 1 Tab by mouth daily. Indications: Stress Ulcer Prevention    metOLazone (ZAROXOLYN) 2.5 mg tablet Take 1 Tab by mouth every fourty-eight (48) hours.  rosuvastatin (CRESTOR) 5 mg tablet TAKE ONE TABLET NIGHTLY    aspirin 81 mg chewable tablet Take 1 Tab by mouth daily (with breakfast).     colchicine (MITIGARE) 0.6 mg capsule     albuterol (PROAIR HFA) 90 mcg/actuation inhaler INHALE 2 PUFFS EVERY 4 HOURS AS NEEDED FOR WHEEZING    albuterol (PROVENTIL VENTOLIN) 2.5 mg /3 mL (0.083 %) nebulizer solution USE 1 NEB EVERY 4 HOURS FOR WHEEZING AND SHORTNESS OF BREATH    umeclidinium-vilanterol (ANORO ELLIPTA) 62.5-25 mcg/actuation inhaler INHALE 1 PUFF DAILY    oxybutynin (DITROPAN) 5 mg tablet Take 5 mg by mouth two (2) times a day.  allopurinol (ZYLOPRIM) 100 mg tablet Take  by mouth daily.  insulin glargine (LANTUS) 100 unit/mL injection Inject 70 units subcutaneously before breakfast (7AM) and 40 units subcutaneously after dinner (7PM). Indications: type 2 diabetes mellitus    insulin lispro (HUMALOG) 100 unit/mL injection To be given 15 min before meals: < 150 - None, 151-200 - 2 u, 201-250 - 4 u, 251-300 - 6 u, 301-350 - 8 u, 351-400 - 10 u, >400 - 12 u    levothyroxine (SYNTHROID) 100 mcg tablet Take 1 Tab by mouth Daily (before breakfast). Indications: hypothyroidism (Patient taking differently: Take 75 mcg by mouth Daily (before breakfast). Indications: a condition with low thyroid hormone levels)    cholecalciferol (VITAMIN D3) 1,000 unit tablet Take 2 Tabs by mouth daily. Indications: PREVENTION OF VITAMIN D DEFICIENCY    divalproex DR (DEPAKOTE) 250 mg tablet Take 250 mg by mouth nightly. Indications: BIPOLAR DISORDER IN REMISSION    insulin syringe,safetyneedle 1 mL 30 gauge x 5/16\" syrg As directed    ARIPiprazole (ABILIFY) 5 mg tablet Take 10 mg by mouth daily. Indications: BIPOLAR DISORDER IN REMISSION    gabapentin (NEURONTIN) 300 mg capsule Take 300 mg by mouth daily. Indications: NEUROPATHIC PAIN    traZODone (DESYREL) 100 mg tablet Take 100 mg by mouth as needed. Indications: major depressive disorder    Nebulizer Accessories Kit Use with nebulizer machine    sertraline (ZOLOFT) 100 mg tablet Take 50 mg by mouth daily. Indications: Bipolar Depression    Oxygen-Air Delivery Systems by Nasal route continuous. 2 LPM via NC  Indications: Hypoxemia requiring supplementary oxygen     No current facility-administered medications for this visit.         Visit Vitals  /72   Pulse 61   Ht 5' 5\" (1.651 m)   Wt 77.1 kg (170 lb)   LMP 11/25/2012 (Exact Date)   BMI 28.29 kg/m²         Physical Exam   Constitutional: She is oriented to person, place, and time. She appears well-developed and well-nourished. HENT:   Head: Normocephalic and atraumatic. Eyes: Conjunctivae are normal.   Neck: Neck supple. No JVD present. No tracheal deviation present. No thyromegaly present. Cardiovascular: Normal rate, regular rhythm, normal heart sounds and intact distal pulses. PMI is not displaced. Exam reveals no gallop, no S3 and no decreased pulses. No murmur heard. Pulmonary/Chest: No respiratory distress. She has decreased breath sounds. She has no wheezes. She has no rales. She exhibits no tenderness. Abdominal: Soft. There is no tenderness. Musculoskeletal: She exhibits edema (trace pedal edema). Neurological: She is alert and oriented to person, place, and time. Skin: Skin is warm. Psychiatric: She has a normal mood and affect. No flowsheet data found. Echo:4/2016   NORMAL GLOBAL LEFT VENTRICULAR SYSTOLIC FUNCTION. EJECTION FRACTION OF 65%. MILD DIASTOLIC DYSFUNCTION. DILATED LEFT ATRIUM. ESTIMATED PULMONARY ARTERY PRESSURE IS 39 MMHG. NO HEMODYNAMICALLY SIGNIFICANT VALVULAR PATHOLOGY. OTHER FINDINGS AS NOTED BELOW. I have personally reviewed patients ekg done at other facility. 7/2016-  I Have personally reviewed recent relevant labs available and discussed with patient  [facility]  I have personally reviewed patient's records available from hospital and other providers and incorporated findings in patient care. IMAGING AND PROCEDURES-10/2016  1. Chest x-ray was done and showed top normal size of heart, stable  hyperinflation, stable chronic increased interstitial marking consistent  with interstitial lung disease.   2. Dobutamine stress test was done, reported EF of 44% with a small  reversible inferior wall defect.-medically reated  3. Echocardiogram was done and showed 55% with grade 1 diastolic dysfunction. 4. Sputum culture was done and showed a few Demetra albicans.       Results for Deyanira Velasquez (MRN [id number]) as of 10/9/2019 10:40   Ref. Range 9/30/2019 00:00   Triglyceride Latest Ref Range: 0 - 149 mg/dL 204 (H)   Cholesterol, total Latest Ref Range: 100 - 199 mg/dL 141   HDL Cholesterol Latest Ref Range: >39 mg/dL 39 (L)   LDL, calculated Latest Ref Range: 0 - 99 mg/dL 61   VLDL, calculated Latest Ref Range: 5 - 40 mg/dL 41 (H)     Assessment       ICD-10-CM ICD-9-CM    1. Sarcoidosis D86.9 135     on home o2   2. Diastolic CHF, chronic ([facility]) I50.32 428.32      428.0     stable  multifactorial sob       3. Pulmonary emphysema, unspecified emphysema type ([facility] J43.9 492.8     On Home oxygen   4. Abnormal nuclear stress test R94.39 794.39     stable   5. Essential hypertension I10 401.9     Controlled   6. CKD stage G3a/A1, GFR 45-59 and albumin creatinine ratio <30 mg/g ([facility]) N18.3 585.3     stable  renal f/u   7. Hyperlipidemia, unspecified hyperlipidemia type E78.5 272.4     On statin, LDL 61   8. Gastroesophageal reflux disease with hiatal hernia K21.9 530.81 famotidine (PEPCID) 20 mg tablet    K44.9 553.3 DISCONTINUED: famotidine (PEPCID) 20 mg tablet   ok to stop plavix for procedures  8/2018  Okay to stop Plavix for dental work. Will discontinue Plavix. She was started on Plavix in 10/2016 for abnormal dobutamine stress test and mildly abnormal troponin. At this point will continue with aspirin only. Continue statin and other medication. 10/2019  Cardiac status stable. SOB is multifactorial.  She is on home oxygen and to have PFT tomorrow. LDL 61 - continue statin.      Medications Discontinued During This Encounter   Medication Reason    azithromycin (ZITHROMAX) 250 mg tablet     lidocaine HCl-me-salicyl-menth 7-60-64 % ktcg     predniSONE (DELTASONE) 10 mg tablet     predniSONE (DELTASONE) 10 mg tablet     famotidine (PEPCID) 20 mg tablet Reorder    famotidine (PEPCID) 20 mg tablet        Orders Placed This Encounter    DISCONTD: famotidine (PEPCID) 20 mg tablet     Sig: Take 1 Tab by mouth daily. Indications: Stress Ulcer Prevention     Dispense:  15 Tab     Refill:  0    famotidine (PEPCID) 20 mg tablet     Sig: Take 1 Tab by mouth daily. Indications: Stress Ulcer Prevention     Dispense:  30 Tab     Refill:  1       Follow-up and Dispositions    · Return in about 6 months (around 4/9/2020), or if symptoms worsen or fail to improve. I have independently evaluated taken history and examined the patient. All relevant labs and testing data's are reviewed. Care plan discussed and updated after review.   Leopold Bile, MD

## 2019-10-15 NOTE — PROGRESS NOTES
Verbal Order with read back per Alber Miller MD  For PFT smart panel. AMB POC PFT complete w/ bronchodilator  AMB POC PFT complete w/o bronchodilator    Dr. Roxanne Dale MD will co-sign the orders.

## 2019-10-15 NOTE — PROGRESS NOTES
The pt. C/o SOB and chest congestion with prod. Of thick beige sputum. Nasal congestion produces yellow sputum'    Increased SOB on O2 at 2 liters. Symptoms x 1 week.

## 2019-10-15 NOTE — PROGRESS NOTES
HISTORY OF PRESENT ILLNESS  Monica Robles is a 62 y.o. female. History of Sarcoidosis and COPD, last FEV 1 in 2012 was 51%, pt has been unable to perform FVC maneuvers since. Pt has responded to Anoro and has been off Prednisone but now complains of increased SOB and a cough productive of yellowish phlegm for at least 2 weeks. She denies fever but has noted increased wheezing. Pt with history of CKD and is followed by Dr. Telma Mason. Her latest BMP shows some improvement in Creatinine. Pt reports improved glycemic control with significant weight loss, she is on a strict diet due to gout but had a recent flare up after dietary indiscretion. Pt admits to smoking 1 pack of cigarettes daily despite O2 use but no further use of recreational drugs. COPD   The history is provided by the patient. This is a chronic problem. The problem occurs daily. The problem has been rapidly worsening. Associated symptoms include shortness of breath. Pertinent negatives include no chest pain, no abdominal pain and no headaches. The symptoms are aggravated by exertion. The symptoms are relieved by medications. Treatments tried: Anoro. Improvement on treatment: see above. Review of Systems   Constitutional: Positive for malaise/fatigue. Negative for chills, diaphoresis, fever and weight loss. HENT: Positive for congestion. Negative for ear discharge, ear pain, hearing loss, nosebleeds, sinus pain, sore throat and tinnitus. Eyes: Negative for blurred vision, double vision, photophobia, pain, discharge and redness. Respiratory: Positive for cough, sputum production, shortness of breath and wheezing. Negative for hemoptysis and stridor. Cardiovascular: Positive for leg swelling. Negative for chest pain, palpitations, orthopnea, claudication and PND. Gastrointestinal: Negative for abdominal pain, blood in stool, constipation, diarrhea, heartburn, melena, nausea and vomiting.    Genitourinary: Negative for dysuria, flank pain, frequency, hematuria and urgency. Musculoskeletal: Positive for back pain. Negative for falls, joint pain, myalgias and neck pain. Skin: Negative for itching and rash. Neurological: Negative for dizziness, tingling, tremors, sensory change, speech change, focal weakness, seizures, loss of consciousness, weakness and headaches. Endo/Heme/Allergies: Negative for environmental allergies and polydipsia. Does not bruise/bleed easily. Psychiatric/Behavioral: Positive for depression. Negative for hallucinations, memory loss, substance abuse and suicidal ideas. The patient is not nervous/anxious and does not have insomnia.       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 congestive heart failure (HCC)     Better controlled     Bipolar affective disorder ([facility] [address] 12/5/2012    Cellulitis of right forearm 5/4/2017    Chronic back pain     Chronic lung disease     Chronic obstructive pulmonary disease (COPD) ([facility])     SOB, on paula O2 Recent admission with psychosis     CKD stage G3a/A1, GFR 45-59 and albumin creatinine ratio <30 mg/g ([facility]) 5/11/2017    Urine microalbumin/Creatinine ratio (5/11/2017) = 9 mg/g    Contusion of left elbow 5/4/2017    Depression     Diabetes mellitus ([facility] [address]     Diabetic neuropathy associated with type 2 diabetes mellitus (HCC)     Diastolic dysfunction without heart failure     Stable on diuretics     Falls frequently     Gastroesophageal reflux disease with hiatal hernia     Generalized osteoarthritis of multiple sites     Gout     History of acute renal failure 5/31/2013    History of back injury     jumped out of second story window     History of hepatitis C     treated    History of penicillin allergy     History of vitamin D deficiency 5/10/2017    Hyperuricemia 5/26/2017    Hypothyroidism     Hypoxemia requiring supplemental oxygen 12/29/2014    Intravenous drug user 5/2/2017    Memory difficulty     Menopause     Mixed connective tissue disease ([facility] [address] 5/10/2017    Obesity, Class I, BMI 30-34.9     Olecranon bursitis of right elbow 5/4/2017    Recurrent genital herpes 5/31/2013    Right Achilles tendinitis 5/2/2017    Sarcoidosis     [facility] trait [facility]     Type 2 diabetes with stage 3 chronic kidney disease GFR 30-59 ([facility])     Urge urinary incontinence 5/10/2017    Venous insufficiency     Wears glasses      Current Outpatient Medications on File Prior to Visit   Medication Sig Dispense Refill    acetaminophen (TYLENOL) 325 mg tablet Take  by mouth every four (4) hours as needed for Pain.  famotidine (PEPCID) 20 mg tablet Take 1 Tab by mouth daily. Indications: Stress Ulcer Prevention 30 Tab 1    metOLazone (ZAROXOLYN) 2.5 mg tablet Take 1 Tab by mouth every fourty-eight (48) hours. 90 Tab 1    rosuvastatin (CRESTOR) 5 mg tablet TAKE ONE TABLET NIGHTLY 90 Tab 3    aspirin 81 mg chewable tablet Take 1 Tab by mouth daily (with breakfast). 100 Tab 1    albuterol (PROAIR HFA) 90 mcg/actuation inhaler INHALE 2 PUFFS EVERY 4 HOURS AS NEEDED FOR WHEEZING 1 Inhaler 5    albuterol (PROVENTIL VENTOLIN) 2.5 mg /3 mL (0.083 %) nebulizer solution USE 1 NEB EVERY 4 HOURS FOR WHEEZING AND SHORTNESS OF BREATH 2 Package 3    umeclidinium-vilanterol (ANORO ELLIPTA) 62.5-25 mcg/actuation inhaler INHALE 1 PUFF DAILY 1 Inhaler 5    oxybutynin (DITROPAN) 5 mg tablet Take 5 mg by mouth two (2) times a day.  allopurinol (ZYLOPRIM) 100 mg tablet Take  by mouth daily.  insulin glargine (LANTUS) 100 unit/mL injection Inject 70 units subcutaneously before breakfast (7AM) and 40 units subcutaneously after dinner (7PM).   Indications: type 2 diabetes mellitus 1 Vial 0    insulin lispro (HUMALOG) 100 unit/mL injection To be given 15 min before meals: < 150 - None, 151-200 - 2 u, 201-250 - 4 u, 251-300 - 6 u, 301-350 - 8 u, 351-400 - 10 u, >400 - 12 u 1 Vial 0    levothyroxine (SYNTHROID) 100 mcg tablet Take 1 Tab by mouth Daily (before breakfast). Indications: hypothyroidism (Patient taking differently: Take 75 mcg by mouth Daily (before breakfast). Indications: a condition with low thyroid hormone levels) 15 Tab 0    cholecalciferol (VITAMIN D3) 1,000 unit tablet Take 2 Tabs by mouth daily. Indications: PREVENTION OF VITAMIN D DEFICIENCY 30 Tab 0    divalproex DR (DEPAKOTE) 250 mg tablet Take 250 mg by mouth nightly. Indications: BIPOLAR DISORDER IN REMISSION      insulin syringe,safetyneedle 1 mL 30 gauge x 5/16\" syrg As directed 50 Each 0    ARIPiprazole (ABILIFY) 5 mg tablet Take 10 mg by mouth daily. Indications: BIPOLAR DISORDER IN REMISSION      gabapentin (NEURONTIN) 300 mg capsule Take 300 mg by mouth daily. Indications: NEUROPATHIC PAIN      traZODone (DESYREL) 100 mg tablet Take 100 mg by mouth as needed. Indications: major depressive disorder      Nebulizer Accessories Kit Use with nebulizer machine 1 Kit prn    sertraline (ZOLOFT) 100 mg tablet Take 50 mg by mouth daily. Indications: Bipolar Depression      Oxygen-Air Delivery Systems by Nasal route continuous. 2 LPM via NC  Indications: Hypoxemia requiring supplementary oxygen      traMADol (ULTRAM) 50 mg tablet Take 50 mg by mouth daily.  colchicine (MITIGARE) 0.6 mg capsule        No current facility-administered medications on file prior to visit.       Allergies   Allergen Reactions    Moxifloxacin Rash    Pcn [Penicillins] Swelling    Sulfa (Sulfonamide Antibiotics) Swelling     Social History     Socioeconomic History    Marital status: SINGLE     Spouse name: Not on file    Number of children: Not on file    Years of education: Not on file    Highest education level: Not on file   Occupational History    Occupation: Not employed     Employer: NOT EMPLOYED   Social Needs    Financial resource strain: Not on file    Food insecurity:     Worry: Not on file     Inability: Not on file    Transportation needs:     Medical: Not on file     Non-medical: Not on file   Tobacco Use    Smoking status: Current Every Day Smoker     Packs/day: 1.00     Years: 30.00     Pack years: 30.00     Types: Cigarettes     Start date: 12/2/1969    Smokeless tobacco: Never Used   Substance and Sexual Activity    Alcohol use: No    Drug use: No     Types: Cocaine, Heroin     Comment: +Cocaine Screen 2013    Sexual activity: Not on file     Comment: 2014   Lifestyle    Physical activity:     Days per week: Not on file     Minutes per session: Not on file    Stress: Not on file   Relationships    Social connections:     Talks on phone: Not on file     Gets together: Not on file     Attends Mu-ism service: Not on file     Active member of club or organization: Not on file     Attends meetings of clubs or organizations: Not on file     Relationship status: Not on file    Intimate partner violence:     Fear of current or ex partner: Not on file     Emotionally abused: Not on file     Physically abused: Not on file     Forced sexual activity: Not on file   Other Topics Concern    Not on file   Social History Narrative    Lives with 15year old son. Blood pressure 110/70, pulse 74, temperature 98 °F (36.7 °C), temperature source Oral, resp. rate 21, height 5' 5\" (1.651 m), weight 79.4 kg (175 lb), last menstrual period 11/25/2012, SpO2 92 %. Physical Exam   Constitutional: She is oriented to person, place, and time. She appears well-developed and well-nourished. No distress. HENT:   Head: Normocephalic. Nose: Nose normal.   Mouth/Throat: Oropharynx is clear and moist. No oropharyngeal exudate. Eyes: Pupils are equal, round, and reactive to light. Conjunctivae are normal. Right eye exhibits no discharge.  Left eye exhibits no discharge. No scleral icterus. Neck: No JVD present. No tracheal deviation present. No thyromegaly present. Cardiovascular: Normal rate, regular rhythm and intact distal pulses. Exam reveals no gallop. No murmur heard. Pulmonary/Chest: Effort normal. No stridor. No respiratory distress. Wheezes: both upper llung fields. She has no rales. She exhibits no tenderness. Distant breath sounds   Abdominal: Soft. She exhibits no mass. There is no tenderness. There is no rebound. Musculoskeletal: She exhibits no tenderness or deformity. Edema: L greater than R ankle. Lymphadenopathy:     She has no cervical adenopathy. Neurological: She is alert and oriented to person, place, and time. Coordination normal.   Skin: Skin is warm and dry. No rash noted. She is not diaphoretic. No erythema. No pallor. Psychiatric: She has a normal mood and affect. Her behavior is normal. Judgment and thought content normal.       CT Results (most recent):  Results from Hospital Encounter encounter on 02/06/18   CT CHEST WO CONT    Narrative Noncontrast CT chest    CPT code 50561    CLINICAL HISTORY: Abnormal chest x-ray in a smoker    TECHNIQUE: 5 mm helical MDCT scan of the chest without contrast. Sagittal and  coronal reformations then created with original data set. All CT scans at this  facility are performed using dose optimization techniques as appropriate to a  performed exam, to include automated exposure control, adjustment of the mA  and/or kV according to patient's size (including appropriate matching for site  specific examinations), or use of iterative reconstruction technique. COMPARISON: 7/27/2016, chest x-ray 1/26/2018    FINDINGS: Lungs: Panacinar emphysema upper lobes. Fibrotic scarring left apex. Lung cysts both lower lobes. Honeycombing right lung base, with worsening since  2016. Tresea Wilder 5 mm nodule right lower lobe adjacent to pleura (34) stable since  7/27/2016.     Airways: Peribronchial thickening around the elan.     Bony skeleton: Old healed left anterolateral rib fractures; old pseudoarthrosis  right posterior 11th rib fracture. Healing left posterolateral fifth rib  fracture (20), new since prior study. New marked compression deformity T5. Surrounding haziness in the posterior mediastinal fat. No bony spinal stenosis  as a result. Otherwise no loss of vertebral body height. Heart and pericardium: Mild calcifications mitral annulus, left coronary artery. Great vessels: Unremarkable for age. Lymph nodes: Small lymph nodes in the middle mediastinum, not pathologically  enlarged. Pleural space: No effusions. Upper abdomen: Stable well-defined 2.4 cm hypodensity superiorly in the spleen. A 1.4 cm hypodensity inferiorly in the spleen not seen previously, measuring  water attenuation. Remainder included solid organs and hollow viscera are  unremarkable. Impression IMPRESSION: Emphysema and fibrosis at the lung bases, worsened particularly on  the right since 2016. No new infiltrate. 5 mm right lower lobe nodule stable since prior study in 2016, probably benign. New compression fracture T5 and new healing left posterolateral fifth rib  fracture. Splenic cysts including a new one inferiorly. 6 minute walk test: reduced 6 MWD with desaturation relieved by supplemental O2  ASSESSMENT and PLAN  Encounter Diagnoses   Name Primary?  Chronic obstructive pulmonary disease, unspecified COPD type ([address] Yes    Sarcoidosis     Pulmonary HTN ([address]     Chronic kidney disease, unspecified CKD stage     COPD exacerbation (HCC)      Sudden worsening in symptoms consistent with exacerbation of COPD. Start Prednisone taper and Zithromax Z pack. Chest PA L ordered, will call pt with results. Repeat Echo to follow R sided pressures. Continue current medications including Anoro and rescue BD. .  Will continue to benefit from supplemental O2.   Rx for Prednisone taper and Z pack as pt in exacerbation. Strongly counseled on smoking cessation. RTC 4 weeks  Flu vaccine to be given after resolution of current symptoms.

## 2019-10-17 NOTE — ED NOTES
I performed a brief evaluation, including history and physical, of the patient here in triage and I have determined that pt will need further treatment and evaluation from the main side ER physician. I have placed initial orders to help in expediting patients care. October 17, 2019 at 5:28 PM - ANDRE Gonzalez Visit Vitals /86 (BP 1 Location: Left arm, BP Patient Position: Sitting) Pulse 98 Temp 98.5 °F (36.9 °C) Resp 20 Ht 5' 5\" (1.651 m) Wt 79.4 kg (175 lb) SpO2 98% BMI 29.12 kg/m²

## 2019-10-17 NOTE — ED PROVIDER NOTES
EMERGENCY DEPARTMENT HISTORY AND PHYSICAL EXAM 
 
 
Date: 10/17/2019 Patient Name: Liana Michael History of Presenting Illness Chief Complaint Patient presents with  Shortness of Breath  
  started 1 week ago, went to PCP 2 days ago and was rx'd a zpack and prednisone, but the pt has had no relief,  pt is on 2L of O2 History Provided By: Patient and Patient's Daughter Chief Complaint: Dyspnea that feels like her COPD and sarcoidosis Additional History (Context): Liana Michael is a 62 y.o. female with Multiple medical problems as below who presents with over a week of cough and URI symptoms and shortness of breath that her primary care provider has been managing with a Z-Berry and prednisone 20 mg daily. Just finished a Z-Berry. Patient feels that when she has had exacerbations like this in the past, that she is needed a higher dose of steroids. Also feels that the antibiotics are not adequately addressing her symptoms. His scant sputum production. Denies any fevers, chills, sweats. Later in the course of her stay, she requested treatment for her narcotic withdrawal symptoms. PCP: Eric Hay NP Current Facility-Administered Medications Medication Dose Route Frequency Provider Last Rate Last Dose  promethazine (PHENERGAN) 25 mg in 0.9% sodium chloride 50 mL IVPB  25 mg IntraVENous ONCE Callie Pinto MD      
 
Current Outpatient Medications Medication Sig Dispense Refill  doxycycline (VIBRA-TABS) 100 mg tablet Take 1 Tab by mouth two (2) times a day for 10 days. 20 Tab 0  promethazine (PHENERGAN) 25 mg tablet Take 1 Tab by mouth every six (6) hours as needed for Nausea (and withdrawl symptoms). 15 Tab 0  cloNIDine HCl (CATAPRES) 0.1 mg tablet Take 1 Tab by mouth three (3) times daily as needed (withdrawl symptoms) for up to 20 doses. 20 Tab 0  predniSONE (DELTASONE) 20 mg tablet Take 40 mg by mouth daily for 10 days.  With Breakfast 20 Tab 0  
  traMADol (ULTRAM) 50 mg tablet Take 50 mg by mouth daily.  acetaminophen (TYLENOL) 325 mg tablet Take  by mouth every four (4) hours as needed for Pain.  famotidine (PEPCID) 20 mg tablet Take 1 Tab by mouth daily. Indications: Stress Ulcer Prevention 30 Tab 1  
 metOLazone (ZAROXOLYN) 2.5 mg tablet Take 1 Tab by mouth every fourty-eight (48) hours. 90 Tab 1  
 rosuvastatin (CRESTOR) 5 mg tablet TAKE ONE TABLET NIGHTLY 90 Tab 3  
 aspirin 81 mg chewable tablet Take 1 Tab by mouth daily (with breakfast). 100 Tab 1  
 colchicine (MITIGARE) 0.6 mg capsule  albuterol (PROAIR HFA) 90 mcg/actuation inhaler INHALE 2 PUFFS EVERY 4 HOURS AS NEEDED FOR WHEEZING 1 Inhaler 5  
 albuterol (PROVENTIL VENTOLIN) 2.5 mg /3 mL (0.083 %) nebulizer solution USE 1 NEB EVERY 4 HOURS FOR WHEEZING AND SHORTNESS OF BREATH 2 Package 3  
 umeclidinium-vilanterol (ANORO ELLIPTA) 62.5-25 mcg/actuation inhaler INHALE 1 PUFF DAILY 1 Inhaler 5  
 oxybutynin (DITROPAN) 5 mg tablet Take 5 mg by mouth two (2) times a day.  allopurinol (ZYLOPRIM) 100 mg tablet Take  by mouth daily.  insulin glargine (LANTUS) 100 unit/mL injection Inject 70 units subcutaneously before breakfast (7AM) and 40 units subcutaneously after dinner (7PM). Indications: type 2 diabetes mellitus 1 Vial 0  
 insulin lispro (HUMALOG) 100 unit/mL injection To be given 15 min before meals: < 150 - None, 151-200 - 2 u, 201-250 - 4 u, 251-300 - 6 u, 301-350 - 8 u, 351-400 - 10 u, >400 - 12 u 1 Vial 0  
 levothyroxine (SYNTHROID) 100 mcg tablet Take 1 Tab by mouth Daily (before breakfast). Indications: hypothyroidism (Patient taking differently: Take 75 mcg by mouth Daily (before breakfast). Indications: a condition with low thyroid hormone levels) 15 Tab 0  cholecalciferol (VITAMIN D3) 1,000 unit tablet Take 2 Tabs by mouth daily.  Indications: PREVENTION OF VITAMIN D DEFICIENCY 30 Tab 0  
  divalproex DR (DEPAKOTE) 250 mg tablet Take 250 mg by mouth nightly. Indications: BIPOLAR DISORDER IN REMISSION  insulin syringe,safetyneedle 1 mL 30 gauge x 5/16\" syrg As directed 50 Each 0  
 ARIPiprazole (ABILIFY) 5 mg tablet Take 10 mg by mouth daily. Indications: BIPOLAR DISORDER IN REMISSION    
 gabapentin (NEURONTIN) 300 mg capsule Take 300 mg by mouth daily. Indications: NEUROPATHIC PAIN    
 traZODone (DESYREL) 100 mg tablet Take 100 mg by mouth as needed. Indications: major depressive disorder  Nebulizer Accessories Kit Use with nebulizer machine 1 Kit prn  sertraline (ZOLOFT) 100 mg tablet Take 50 mg by mouth daily. Indications: Bipolar Depression  Oxygen-Air Delivery Systems by Nasal route continuous. 2 LPM via NC  Indications: Hypoxemia requiring supplementary oxygen Past History Past Medical History: 
Past Medical History:  
Diagnosis Date  Abnormally low high density lipoprotein (HDL) cholesterol with hypertriglyceridemia Lipid profile (11/6/2016) showed , , HDL 38, LDL 37  
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 congestive heart failure ([address] Better controlled  Bipolar affective disorder ([address] 12/5/2012  Cellulitis of right forearm 5/4/2017  Chronic back pain  Chronic lung disease  Chronic obstructive pulmonary disease (COPD) ([address] SOB, on [place] O2 Recent admission with psychosis  CKD stage G3a/A1, GFR 45-59 and albumin creatinine ratio <30 mg/g ([facility]) 5/11/2017 Urine microalbumin/Creatinine ratio (5/11/2017) = 9 mg/g  Contusion of left elbow 5/4/2017  Depression  Diabetes mellitus ([address]  Diabetic neuropathy associated with type 2 diabetes mellitus ([address]  Diastolic dysfunction without heart failure Stable on diuretics  Falls frequently  Gastroesophageal reflux disease with hiatal hernia  Generalized osteoarthritis of multiple sites  Gout  History of acute renal failure 2013  History of back injury   
 jumped out of second story window  History of hepatitis C   
 treated  History of penicillin allergy  History of vitamin D deficiency 5/10/2017  Hyperuricemia 2017  Hypothyroidism  Hypoxemia requiring supplemental oxygen 2014  Intravenous drug user 2017  Memory difficulty  Menopause  Mixed connective tissue disease ([facility] 5/10/2017  Obesity, Class I, BMI 30-34.9  Olecranon bursitis of right elbow 2017  Recurrent genital herpes 2013  Right Achilles tendinitis 2017  Sarcoidosis  Sickle cell trait ([facility]  Type 2 diabetes with stage 3 chronic kidney disease GFR 30-59 ([facility])  Urge urinary incontinence 5/10/2017  Venous insufficiency  Wears glasses Past Surgical History: 
Past Surgical History:  
[facility]  [facility]  SECTION    
 HX CYST REMOVAL Left  Left foot  HX OTHER SURGICAL Left 2017 S/P incision and drainage of the abscess of the left hand and the left foot (2017 - Dr. Roberta Payne. Glory Alvares)  HX TUBAL LIGATION Family History: 
Family History Problem Relation Age of Onset  Seizures Other  Diabetes Mother  Hypertension Mother  Heart Disease Mother  Cancer Mother  Diabetes Father  Stroke Father  Heart Disease Father  Headache Sister  Depression Neg Hx  Suicide Neg Hx  Psychotic Disorder Neg Hx  Substance Abuse Neg Hx  Dementia Neg Hx Social History: 
Social History Tobacco Use  Smoking status: Current Every Day Smoker Packs/day: 1.00 Years: 30.00 Pack years: 30.00 Types: Cigarettes Start date: 1969  Smokeless tobacco: Never Used Substance Use Topics  Alcohol use: No  
 Drug use:  No  
 Types: Cocaine, Heroin Comment: +Cocaine Screen 2013 Allergies: Allergies Allergen Reactions  Moxifloxacin Rash  Pcn [Penicillins] Swelling  Sulfa (Sulfonamide Antibiotics) Swelling Review of Systems Review of Systems Constitutional: Positive for fatigue. Negative for chills and fever. HENT: Positive for congestion and rhinorrhea. Negative for sore throat and trouble swallowing. Eyes: Negative for discharge, redness and itching. Respiratory: Positive for cough, chest tightness, shortness of breath and wheezing. Negative for stridor. Cardiovascular: Negative for chest pain, palpitations and leg swelling. Gastrointestinal: Negative for abdominal pain, blood in stool, diarrhea, nausea and vomiting. Genitourinary: Negative for difficulty urinating and dysuria. Musculoskeletal: Negative for back pain. Skin: Negative for rash. Neurological: Negative for tremors, seizures, syncope, speech difficulty, weakness, light-headedness and numbness. Psychiatric/Behavioral: Negative for behavioral problems and confusion. All other systems reviewed and are negative. Physical Exam  
 
Vitals:  
 10/17/19 1845 10/17/19 1900 10/17/19 1930 10/17/19 2017 BP: (!) 184/92 (!) 184/97 (!) 177/93 165/85 Pulse: 93 91 98 (!) 102 Resp: 25 30 22 Temp:      
SpO2: 98% 97% Weight:      
Height:      
 
Physical Exam  
Constitutional: She is oriented to person, place, and time. She appears well-developed and well-nourished. No distress. HENT:  
Head: Normocephalic and atraumatic. Nose: Nose normal.  
Mouth/Throat: Oropharynx is clear and moist.  
Eyes: Pupils are equal, round, and reactive to light. Conjunctivae and EOM are normal.  
Neck: Normal range of motion. Neck supple. Cardiovascular: Normal rate, regular rhythm and normal heart sounds. Pulmonary/Chest: Effort normal. She has wheezes. She has no rales. Diffuse expiratory wheezes with adequate air movement throughout Abdominal: Soft. Bowel sounds are normal. She exhibits no distension. There is no tenderness. Musculoskeletal: Normal range of motion. She exhibits no edema. Lymphadenopathy:  
  She has no cervical adenopathy. Neurological: She is alert and oriented to person, place, and time. Skin: Skin is warm and dry. No rash noted. She is not diaphoretic. Psychiatric: She has a normal mood and affect. Her behavior is normal. Judgment and thought content normal.  
Nursing note and vitals reviewed. Diagnostic Study Results Labs - Recent Results (from the past 12 hour(s)) EKG, 12 LEAD, INITIAL Collection Time: 10/17/19  5:18 PM  
Result Value Ref Range Ventricular Rate 100 BPM  
 Atrial Rate 100 BPM  
 P-R Interval 92 ms QRS Duration 70 ms Q-T Interval 386 ms QTC Calculation (Bezet) 497 ms Calculated P Axis -13 degrees Calculated R Axis -10 degrees Calculated T Axis -5 degrees Diagnosis Sinus rhythm with short IL Minimal voltage criteria for LVH, may be normal variant Inferior infarct , age undetermined Abnormal ECG When compared with ECG of 17-FEB-2019 12:57, Inferior infarct is now present T wave inversion now evident in Inferior leads T wave amplitude has increased in Lateral leads CBC WITH AUTOMATED DIFF Collection Time: 10/17/19  6:26 PM  
Result Value Ref Range WBC 7.0 4.6 - 13.2 K/uL  
 RBC 4.29 4. 20 - 5.30 M/uL  
 HGB 12.5 12.0 - 16.0 g/dL HCT 36.6 35.0 - 45.0 % MCV 85.3 74.0 - 97.0 FL  
 MCH 29.1 24.0 - 34.0 PG  
 MCHC 34.2 31.0 - 37.0 g/dL  
 RDW 14.0 11.6 - 14.5 % PLATELET 105 893 - 965 K/uL MPV 9.0 (L) 9.2 - 11.8 FL  
 NEUTROPHILS 82 (H) 40 - 73 % LYMPHOCYTES 15 (L) 21 - 52 % MONOCYTES 3 3 - 10 % EOSINOPHILS 0 0 - 5 % BASOPHILS 0 0 - 2 %  
 ABS. NEUTROPHILS 5.7 1.8 - 8.0 K/UL  
 ABS. LYMPHOCYTES 1.0 0.9 - 3.6 K/UL  
 ABS. MONOCYTES 0.2 0.05 - 1.2 K/UL ABS. EOSINOPHILS 0.0 0.0 - 0.4 K/UL  
 ABS. BASOPHILS 0.0 0.0 - 0.1 K/UL  
 DF AUTOMATED METABOLIC PANEL, BASIC Collection Time: 10/17/19  6:26 PM  
Result Value Ref Range Sodium 132 (L) 136 - 145 mmol/L Potassium 3.9 3.5 - 5.5 mmol/L Chloride 96 (L) 100 - 111 mmol/L  
 CO2 29 21 - 32 mmol/L Anion gap 7 3.0 - 18 mmol/L Glucose 444 (HH) 74 - 99 mg/dL BUN 14 7.0 - 18 MG/DL Creatinine 1.33 (H) 0.6 - 1.3 MG/DL  
 BUN/Creatinine ratio 11 (L) 12 - 20 GFR est AA 49 (L) >60 ml/min/1.73m2 GFR est non-AA 40 (L) >60 ml/min/1.73m2 Calcium 9.1 8.5 - 10.1 MG/DL  
TROPONIN I Collection Time: 10/17/19  6:26 PM  
Result Value Ref Range Troponin-I, QT <0.02 0.0 - 0.045 NG/ML  
NT-PRO BNP Collection Time: 10/17/19  6:26 PM  
Result Value Ref Range NT pro-BNP 1,082 (H) 0 - 900 PG/ML Radiologic Studies -  
XR CHEST PA LAT Final Result IMPRESSION:  
  
Chronic interstitial lung disease CT Results  (Last 48 hours) None CXR Results  (Last 48 hours) 10/17/19 1743  XR CHEST PA LAT Final result Impression:  IMPRESSION:  
   
Chronic interstitial lung disease Narrative:  EXAM: CHEST PA AND LATERAL  
   
CLINICAL HISTORY/INDICATION:  SOB  
   
COMPARISON: Chest x-ray May 13, 2019, November 5, 2018. TECHNIQUE: PA and lateral views FINDINGS:   
   
 The cardiac and mediastinal silhouette is normal.  The lungs are emphysematous. Linear scarring in the upper lungs. Prominent interstitial markings at the mid  
to lower lungs. Medical Decision Making I am the first provider for this patient. I reviewed the vital signs, available nursing notes, past medical history, past surgical history, family history and social history. Vital Signs-Reviewed the patient's vital signs. Records Reviewed: Old Medical Records ED Course:  
Felt better after after nebs and other treatment in the emergency department was ready to go home. Disposition: 
Discharge home DISCHARGE NOTE:  
 
Pt has been reexamined. Patient has no new complaints, changes, or physical findings. Care plan outlined and precautions discussed. Results of labs and x-ray were reviewed with the patient. All medications were reviewed with the patient; will d/c home with higher dose of prednisone, new prescription for doxycycline, and symptomatic treatment of her withdrawal symptoms with clonidine and Phenergan. All of pt's questions and concerns were addressed. Patient was instructed and agrees to follow up with her primary care provider, as well as to return to the ED upon further deterioration. Patient is ready to go home. Follow-up Information Follow up With Specialties Details Why Contact Info Nahun Ledesma NP Nurse Practitioner Call in 1 day  [address] [facility] [address] [phone] [facility] EMERGENCY DEPT Emergency Medicine  As needed, If symptoms worsen [address] [id number] 
[phone] Current Discharge Medication List  
  
START taking these medications Details  
doxycycline (VIBRA-TABS) 100 mg tablet Take 1 Tab by mouth two (2) times a day for 10 days. Qty: 20 Tab, Refills: 0  
  
promethazine (PHENERGAN) 25 mg tablet Take 1 Tab by mouth every six (6) hours as needed for Nausea (and withdrawl symptoms). Qty: 15 Tab, Refills: 0  
  
cloNIDine HCl (CATAPRES) 0.1 mg tablet Take 1 Tab by mouth three (3) times daily as needed (withdrawl symptoms) for up to 20 doses. Qty: 20 Tab, Refills: 0 CONTINUE these medications which have CHANGED Details  
predniSONE (DELTASONE) 20 mg tablet Take 40 mg by mouth daily for 10 days. With Breakfast 
Qty: 20 Tab, Refills: 0 CONTINUE these medications which have NOT CHANGED Details  
traMADol (ULTRAM) 50 mg tablet Take 50 mg by mouth daily. acetaminophen (TYLENOL) 325 mg tablet Take  by mouth every four (4) hours as needed for Pain.  
  
famotidine (PEPCID) 20 mg tablet Take 1 Tab by mouth daily. Indications: Stress Ulcer Prevention 
Qty: 30 Tab, Refills: 1 Associated Diagnoses: Gastroesophageal reflux disease with hiatal hernia  
  
metOLazone (ZAROXOLYN) 2.5 mg tablet Take 1 Tab by mouth every fourty-eight (48) hours. Qty: 90 Tab, Refills: 1  
  
rosuvastatin (CRESTOR) 5 mg tablet TAKE ONE TABLET NIGHTLY Qty: 90 Tab, Refills: 3  
  
aspirin 81 mg chewable tablet Take 1 Tab by mouth daily (with breakfast). Qty: 100 Tab, Refills: 1  
  
colchicine (MITIGARE) 0.6 mg capsule   
  
albuterol (PROAIR HFA) 90 mcg/actuation inhaler INHALE 2 PUFFS EVERY 4 HOURS AS NEEDED FOR WHEEZING Qty: 1 Inhaler, Refills: 5  
  
albuterol (PROVENTIL VENTOLIN) 2.5 mg /3 mL (0.083 %) nebulizer solution USE 1 NEB EVERY 4 HOURS FOR WHEEZING AND SHORTNESS OF BREATH Qty: 2 Package, Refills: 3 Associated Diagnoses: COPD, severe ([address]  
  
umeclidinium-vilanterol (ANORO ELLIPTA) 62.5-25 mcg/actuation inhaler INHALE 1 PUFF DAILY Qty: 1 Inhaler, Refills: 5  
  
oxybutynin (DITROPAN) 5 mg tablet Take 5 mg by mouth two (2) times a day. allopurinol (ZYLOPRIM) 100 mg tablet Take  by mouth daily. insulin glargine (LANTUS) 100 unit/mL injection Inject 70 units subcutaneously before breakfast (7AM) and 40 units subcutaneously after dinner (7PM). Indications: type 2 diabetes mellitus Qty: 1 Vial, Refills: 0 Associated Diagnoses: Type 2 diabetes mellitus with stage 3 chronic kidney disease, with long-term current use of insulin (HCC)  
  
insulin lispro (HUMALOG) 100 unit/mL injection To be given 15 min before meals: < 150 - None, 151-200 - 2 u, 201-250 - 4 u, 251-300 - 6 u, 301-350 - 8 u, 351-400 - 10 u, >400 - 12 u Qty: 1 Vial, Refills: 0 Associated Diagnoses: Type 2 diabetes mellitus with stage 3 chronic kidney disease, with long-term current use of insulin ([facility] levothyroxine (SYNTHROID) 100 mcg tablet Take 1 Tab by mouth Daily (before breakfast). Indications: hypothyroidism 
Qty: 15 Tab, Refills: 0 Associated Diagnoses: Acquired hypothyroidism  
  
cholecalciferol (VITAMIN D3) 1,000 unit tablet Take 2 Tabs by mouth daily. Indications: PREVENTION OF VITAMIN D DEFICIENCY Qty: 30 Tab, Refills: 0 Associated Diagnoses: History of vitamin D deficiency  
  
divalproex DR (DEPAKOTE) 250 mg tablet Take 250 mg by mouth nightly. Indications: BIPOLAR DISORDER IN REMISSION  
  
insulin syringe,safetyneedle 1 mL 30 gauge x 5/16\" syrg As directed Qty: 50 Each, Refills: 0  
  
ARIPiprazole (ABILIFY) 5 mg tablet Take 10 mg by mouth daily. Indications: BIPOLAR DISORDER IN REMISSION Comments: take 2 tabs each morning  
  
gabapentin (NEURONTIN) 300 mg capsule Take 300 mg by mouth daily. Indications: NEUROPATHIC PAIN  
  
traZODone (DESYREL) 100 mg tablet Take 100 mg by mouth as needed. Indications: major depressive disorder Nebulizer Accessories Kit Use with nebulizer machine Qty: 1 Kit, Refills: prn  
 Associated Diagnoses: COPD (chronic obstructive pulmonary disease) (HCC)  
  
sertraline (ZOLOFT) 100 mg tablet Take 50 mg by mouth daily. Indications: Bipolar Depression Oxygen-Air Delivery Systems by Nasal route continuous. 2 LPM via NC  Indications: Hypoxemia requiring supplementary oxygen STOP taking these medications  
  
 azithromycin (ZITHROMAX) 250 mg tablet Comments:  
Reason for Stopping:   
   
  
 
 
Provider Notes (Medical Decision Making): Dyspnea and acute exacerbation of chronic obstructive pulmonary disease. No evidence of acute coronary syndrome, pulmonary embolism, or other acute life threat. Symptomatically better after nebs in the emergency department. Given her COPD flare as well as her underlying sarcoidosis, will increase her prednisone from 20 mg daily to 40 mg daily.   Patient is to call her primary care provider tomorrow to talk about long-term management of her oral steroids, will likely need to taper down off from this dose. Also with history of narcotic abuse and requesting symptomatic relief from her withdrawal symptoms; given clonidine and Phenergan both in the emergency department and prescriptions to go home with. She already has access to outpatient resources for narcotic abuse. Diagnosis Clinical Impression: 1. Atypical pneumonia 2. Acute exacerbation of chronic obstructive pulmonary disease (COPD) ([facility] 3. Sarcoidosis of lung ([facility] 4. Narcotic withdrawal ([facility]

## 2019-10-18 NOTE — ED NOTES
Patient A/O x 4, appears anxious with complaints of SOB. Patient states, \"I need something for my tremors, I'm having heroine withdrawals\". Wheezing and rhonchi noted to lung fields. Patient receiving 2 liters O2 via NC. Denies chest pain, abdominal pain, N/V.

## 2019-10-18 NOTE — ED NOTES
Patient's daughter removed patient from cardiac monitor. Awaiting to give patient insulin to discharge patient.

## 2019-10-18 NOTE — ED NOTES
I have reviewed discharge instructions with the patient. The patient verbalized understanding. Patient looks comfortable, left ED in stable condition. Wheeled to daughter's vehicle. No acute distress noted.

## 2019-10-18 NOTE — DISCHARGE INSTRUCTIONS
Patient Education     Call your doctor tomorrow to find out how she would like you to manage your chronic prednisone dosing. I have given you a dose of 40 mg of prednisone for the next 10 days. Your doctor will likely want you to do some sort of taper related to taking this higher dose of prednisone. Chronic Obstructive Pulmonary Disease (COPD): Care Instructions  Your Care Instructions    Chronic obstructive pulmonary disease (COPD) is a general term for a group of lung diseases, including emphysema and chronic bronchitis. People with COPD have decreased airflow in and out of the lungs, which makes it hard to breathe. The airways also can get clogged with thick mucus. Cigarette smoking is a major cause of COPD. Although there is no cure for COPD, you can slow its progress. Following your treatment plan and taking care of yourself can help you feel better and live longer. Follow-up care is a key part of your treatment and safety. Be sure to make and go to all appointments, and call your doctor if you are having problems. It's also a good idea to know your test results and keep a list of the medicines you take. How can you care for yourself at home?   Staying healthy    · Do not smoke. This is the most important step you can take to prevent more damage to your lungs. If you need help quitting, talk to your doctor about stop-smoking programs and medicines. These can increase your chances of quitting for good.     · Avoid colds and flu. Get a pneumococcal vaccine shot. If you have had one before, ask your doctor whether you need a second dose. Get the flu vaccine every fall. If you must be around people with colds or the flu, wash your hands often.     · Avoid secondhand smoke, air pollution, and high altitudes. Also avoid cold, dry air and hot, humid air. Stay at home with your windows closed when air pollution is bad.    Medicines and oxygen therapy    · Take your medicines exactly as prescribed.  Call your doctor if you think you are having a problem with your medicine.     · You may be taking medicines such as:  ? Bronchodilators. These help open your airways and make breathing easier. Bronchodilators are either short-acting (work for 6 to 9 hours) or long-acting (work for 24 hours). You inhale most bronchodilators, so they start to act quickly. Always carry your quick-relief inhaler with you in case you need it while you are away from home. ? Corticosteroids (prednisone, budesonide). These reduce airway inflammation. They come in pill or inhaled form. You must take these medicines every day for them to work well.     · A spacer may help you get more inhaled medicine to your lungs. Ask your doctor or pharmacist if a spacer is right for you. If it is, ask how to use it properly.     · Do not take any vitamins, over-the-counter medicine, or herbal products without talking to your doctor first.     · If your doctor prescribed antibiotics, take them as directed. Do not stop taking them just because you feel better. You need to take the full course of antibiotics.     · Oxygen therapy boosts the amount of oxygen in your blood and helps you breathe easier. Use the flow rate your doctor has recommended, and do not change it without talking to your doctor first.   Activity    · Get regular exercise. Walking is an easy way to get exercise. Start out slowly, and walk a little more each day.     · Pay attention to your breathing. You are exercising too hard if you cannot talk while you are exercising.     · Take short rest breaks when doing household chores and other activities.     · Learn breathing methods--such as breathing through pursed lips--to help you become less short of breath.     · If your doctor has not set you up with a pulmonary rehabilitation program, talk to him or her about whether rehab is right for you.  Rehab includes exercise programs, education about your disease and how to manage it, help with diet and other changes, and emotional support. Diet    · Eat regular, healthy meals. Use bronchodilators about 1 hour before you eat to make it easier to eat. Eat several small meals instead of three large ones. Drink beverages at the end of the meal. Avoid foods that are hard to chew.     · Eat foods that contain protein so that you do not lose muscle mass.     · Talk with your doctor if you gain too much weight or if you lose weight without trying.    Mental health    · Talk to your family, friends, or a therapist about your feelings. It is normal to feel frightened, angry, hopeless, helpless, and even guilty. Talking openly about bad feelings can help you cope. If these feelings last, talk to your doctor. When should you call for help? Call 911 anytime you think you may need emergency care. For example, call if:    · You have severe trouble breathing.    Call your doctor now or seek immediate medical care if:    · You have new or worse trouble breathing.     · You cough up blood.     · You have a fever.    Watch closely for changes in your health, and be sure to contact your doctor if:    · You cough more deeply or more often, especially if you notice more mucus or a change in the color of your mucus.     · You have new or worse swelling in your legs or belly.     · You are not getting better as expected. Where can you learn more? Go to http://rick-kathya.info/. Enrike Perry in the search box to learn more about \"Chronic Obstructive Pulmonary Disease (COPD): Care Instructions. \"  Current as of: June 9, 2019  Content Version: 12.2  © 6972-4461 [employer]. Care instructions adapted under license by [employer] (which disclaims liability or warranty for this information).  If you have questions about a medical condition or this instruction, always ask your healthcare professional. [place] [address] any warranty or liability for your use of this information. Patient Education        Sarcoidosis: Care Instructions  Your Care Instructions    Sarcoidosis (say \"gjg-pbd-CLX-haroon\") is a rare disease that causes tiny lumps of cells throughout the body called granulomas. These lumps are too small to see or feel. They can form anywhere on the inside or outside of the body and can cause permanent scar tissue. They often form in the lungs, lymph nodes, liver, skin, or eyes. Sarcoidosis may affect how an organ works. For instance, if it is in the lungs, you may be short of breath. For most people, sarcoidosis is a long-term disease that lasts several years or a lifetime. But some cases go away in a few months. Experts have no way of knowing how it will affect you. For some people, the disease may cause no symptoms at all. For others, symptoms may include fever, body aches, swollen lymph glands, shortness of breath, painful joints, and numbness. It may lead to lung or heart problems. Sometimes sarcoidosis can cause high calcium levels in the blood. Sarcoidosis occurs most often in young and middle-aged adults. Although the cause is not known, the disease does not spread from person to person. Different types of sarcoidosis have different treatments. Sarcoidosis may require long-term treatment (lasting months to years) with corticosteroids and other medicines, especially if it causes symptoms. You may also need regular tests. Follow-up care is a key part of your treatment and safety. Be sure to make and go to all appointments, and call your doctor if you are having problems. It's also a good idea to know your test results and keep a list of the medicines you take. How can you care for yourself at home? · Take your medicines exactly as prescribed. Call your doctor if you think you are having a problem with your medicine. · Do not smoke. Smoking can make sarcoidosis worse.  If you need help quitting, talk to your doctor about stop-smoking programs and medicines. These can increase your chances of quitting for good. · Avoid dust, smoke, and fumes. They can harm your lungs. · Drink plenty of fluids, enough so that your urine is light yellow or clear like water. If you have kidney, heart, or liver disease and have to limit fluids, talk with your doctor before you increase the amount of fluids you drink. · If your doctor recommends it, get more exercise. Walking is a good choice. Bit by bit, increase the amount you walk every day. Try for at least 30 minutes on most days of the week. You also may want to swim, bike, or do other activities. When should you call for help? Call 911 anytime you think you may need emergency care. For example, call if:    · You have severe trouble breathing.     · You passed out (lost consciousness).    Call your doctor now or seek immediate medical care if:    · You have changes in your vision.     · You are very tired, get confused, or urinate a lot.     · Your symptoms do not get better, or they get worse.    Watch closely for changes in your health, and be sure to contact your doctor if you have any problems. Where can you learn more? Go to http://rick-kathya.info/. Enter [id number] in the search box to learn more about \"Sarcoidosis: Care Instructions. \"  Current as of: June 9, 2019  Content Version: 12.2  © 8222-3669 [employer]. Care instructions adapted under license by [employer] (which disclaims liability or warranty for this information). If you have questions about a medical condition or this instruction, always ask your healthcare professional. [place] [address] any warranty or liability for your use of this information.

## 2019-10-18 NOTE — ED NOTES
2130 PM : Pt care transferred to Dr. Shubham Jimenez  ,ED provider. History of patient complaint(s), available diagnostic reports and current treatment plan has been discussed thoroughly. Bedside rounding on patient occured : yes . Intended disposition of patient : Home Pending diagnostics reports and/or labs (please list): re-evaluation of BG, opiate withdrawal sx. Given 12 units humalog from her sliding scale and suboxone. Will re-evaluate. 11:11 PM 
Patient feels significantly improved following the Suboxone. She was given her sliding scale insulin, but refuses to stay long enough to recheck her blood sugar, though I do not have much concerned that this will cause a concerning change. Patient requesting discharge at this time.

## 2019-10-18 NOTE — ED NOTES
Tremors noted to patient. Patient's heart rate is elevated, with elevated blood pressure. Will inform ED provider. Patient denies any new complaints at this time.

## 2019-10-18 NOTE — ED NOTES
Patient refuses to stay and wait for blood sugar to come down, patient states she will check her blood sugar at home. Patient's daughter at bedside.

## 2019-11-12 NOTE — PROGRESS NOTES
HISTORY OF PRESENT ILLNESS  Abigail Velazquez is a 62 y.o. female. History of Sarcoidosis and COPD, last FEV 1 in 2012 was 51%, pt has been unable to perform FVC maneuvers since. Pt has been maintained on Anoro and has been off Prednisone but now complains of increased SOB and a cough productive of yellowish phlegm for at least 2 weeks. She denies fever but has noted increased wheezing. She was seen in the ED for cough and shortness of breath despite Prednisone and Zithromax given by her PCP. She was discharged with Prednisone taper and Rx for Doxy. Pt with history of CKD and is followed by Dr. Rudy Lopez. Her latest BMP shows some worsening Creatinine and hyperglycemia. Pt reports improved glycemic control with significant weight loss, she is on a strict diet due to gout but had a recent flare up after dietary indiscretion. Pt still smokes 1 pack of cigarettes daily despite O2 use but no further use of recreational drugs. COPD   The history is provided by the patient. This is a chronic problem. The problem occurs daily. The problem has been rapidly worsening. Associated symptoms include shortness of breath. Pertinent negatives include no chest pain, no abdominal pain and no headaches. The symptoms are aggravated by exertion. The symptoms are relieved by medications. Treatments tried: Anoro. Improvement on treatment: see above. Review of Systems   Constitutional: Positive for malaise/fatigue. Negative for chills, diaphoresis, fever and weight loss. HENT: Positive for congestion. Negative for ear discharge, ear pain, hearing loss, nosebleeds, sinus pain, sore throat and tinnitus. Eyes: Negative for blurred vision, double vision, photophobia, pain, discharge and redness. Respiratory: Positive for cough, sputum production, shortness of breath and wheezing. Negative for hemoptysis and stridor. Cardiovascular: Positive for leg swelling.  Negative for chest pain, palpitations, orthopnea, claudication and PND. Gastrointestinal: Negative for abdominal pain, blood in stool, constipation, diarrhea, heartburn, melena, nausea and vomiting. Genitourinary: Negative for dysuria, flank pain, frequency, hematuria and urgency. Musculoskeletal: Positive for back pain. Negative for falls, joint pain, myalgias and neck pain. Skin: Negative for itching and rash. Neurological: Negative for dizziness, tingling, tremors, sensory change, speech change, focal weakness, seizures, loss of consciousness, weakness and headaches. Endo/Heme/Allergies: Negative for environmental allergies and polydipsia. Does not bruise/bleed easily. Psychiatric/Behavioral: Positive for depression. Negative for hallucinations, memory loss, substance abuse and suicidal ideas. The patient is not nervous/anxious and does not have insomnia.       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 congestive heart failure (HCC)     Better controlled     Bipolar affective disorder ([facility] [address] 12/5/2012    Cellulitis of right forearm 5/4/2017    Chronic back pain     Chronic lung disease     Chronic obstructive pulmonary disease (COPD) ([facility]     SOB, on paula O2 Recent admission with psychosis     CKD stage G3a/A1, GFR 45-59 and albumin creatinine ratio <30 mg/g ([facility]) 5/11/2017    Urine microalbumin/Creatinine ratio (5/11/2017) = 9 mg/g    Contusion of left elbow 5/4/2017    Depression     Diabetes mellitus ([facility] [address]     Diabetic neuropathy associated with type 2 diabetes mellitus (HCC)     Diastolic dysfunction without heart failure     Stable on diuretics     Falls frequently     Gastroesophageal reflux disease with hiatal hernia     Generalized osteoarthritis of multiple sites     Gout     History of acute renal failure 5/31/2013    History of back injury     jumped out of second story window     History of hepatitis C     treated    History of penicillin allergy     History of vitamin D deficiency 5/10/2017    Hyperuricemia 5/26/2017    Hypothyroidism     Hypoxemia requiring supplemental oxygen 12/29/2014    Intravenous drug user 5/2/2017    Memory difficulty     Menopause     Mixed connective tissue disease ([facility] [address] 5/10/2017    Obesity, Class I, BMI 30-34.9     Olecranon bursitis of right elbow 5/4/2017    Recurrent genital herpes 5/31/2013    Right Achilles tendinitis 5/2/2017    Sarcoidosis     Sickle cell trait (HCC)     Type 2 diabetes with stage 3 chronic kidney disease GFR 30-59 ([facility])     Urge urinary incontinence 5/10/2017    Venous insufficiency     Wears glasses      Current Outpatient Medications on File Prior to Visit   Medication Sig Dispense Refill    BD INSULIN SYRINGE ULTRA-FINE 1 mL 31 gauge x 5/16 syrg       aspirin 81 mg chewable tablet CHEW 1 TABLET DAILY WITH BREAKFAST 30 Tab 0    acetaminophen (TYLENOL) 325 mg tablet Take  by mouth every four (4) hours as needed for Pain.  famotidine (PEPCID) 20 mg tablet Take 1 Tab by mouth daily. Indications: Stress Ulcer Prevention 30 Tab 1    metOLazone (ZAROXOLYN) 2.5 mg tablet Take 1 Tab by mouth every fourty-eight (48) hours. 90 Tab 1    rosuvastatin (CRESTOR) 5 mg tablet TAKE ONE TABLET NIGHTLY 90 Tab 3    albuterol (PROAIR HFA) 90 mcg/actuation inhaler INHALE 2 PUFFS EVERY 4 HOURS AS NEEDED FOR WHEEZING 1 Inhaler 5    albuterol (PROVENTIL VENTOLIN) 2.5 mg /3 mL (0.083 %) nebulizer solution USE 1 NEB EVERY 4 HOURS FOR WHEEZING AND SHORTNESS OF BREATH 2 Package 3    oxybutynin (DITROPAN) 5 mg tablet Take 5 mg by mouth two (2) times a day.  allopurinol (ZYLOPRIM) 100 mg tablet Take 100 mg by mouth daily.       insulin glargine (LANTUS) 100 unit/mL injection Inject 70 units subcutaneously before breakfast (7AM) and 40 units subcutaneously after dinner (7PM). Indications: type 2 diabetes mellitus 1 Vial 0    insulin lispro (HUMALOG) 100 unit/mL injection To be given 15 min before meals: < 150 - None, 151-200 - 2 u, 201-250 - 4 u, 251-300 - 6 u, 301-350 - 8 u, 351-400 - 10 u, >400 - 12 u 1 Vial 0    levothyroxine (SYNTHROID) 100 mcg tablet Take 1 Tab by mouth Daily (before breakfast). Indications: hypothyroidism (Patient taking differently: Take 75 mcg by mouth Daily (before breakfast). Indications: a condition with low thyroid hormone levels) 15 Tab 0    divalproex DR (DEPAKOTE) 250 mg tablet Take 250 mg by mouth nightly. Indications: BIPOLAR DISORDER IN REMISSION      insulin syringe,safetyneedle 1 mL 30 gauge x 5/16\" syrg As directed 50 Each 0    ARIPiprazole (ABILIFY) 5 mg tablet Take 10 mg by mouth daily. Indications: BIPOLAR DISORDER IN REMISSION      gabapentin (NEURONTIN) 300 mg capsule Take 300 mg by mouth daily as needed. Indications: Neuropathic Pain      traZODone (DESYREL) 100 mg tablet Take 100 mg by mouth as needed. Indications: major depressive disorder      Nebulizer Accessories Kit Use with nebulizer machine 1 Kit prn    sertraline (ZOLOFT) 100 mg tablet Take 50 mg by mouth daily. Indications: Bipolar Depression      Oxygen-Air Delivery Systems by Nasal route continuous. 2 LPM via NC  Indications: Hypoxemia requiring supplementary oxygen      promethazine (PHENERGAN) 25 mg tablet Take 1 Tab by mouth every six (6) hours as needed for Nausea (and withdrawl symptoms). 15 Tab 0    cloNIDine HCl (CATAPRES) 0.1 mg tablet Take 1 Tab by mouth three (3) times daily as needed (withdrawl symptoms) for up to 20 doses. 20 Tab 0    traMADol (ULTRAM) 50 mg tablet Take 50 mg by mouth daily.  colchicine (MITIGARE) 0.6 mg capsule       cholecalciferol (VITAMIN D3) 1,000 unit tablet Take 2 Tabs by mouth daily.  Indications: PREVENTION OF VITAMIN D DEFICIENCY 30 Tab 0     No current facility-administered medications on file prior to visit. Allergies   Allergen Reactions    Moxifloxacin Rash    Pcn [Penicillins] Swelling    Sulfa (Sulfonamide Antibiotics) Swelling     Social History     Socioeconomic History    Marital status: SINGLE     Spouse name: Not on file    Number of children: Not on file    Years of education: Not on file    Highest education level: Not on file   Occupational History    Occupation: Not employed     Employer: NOT EMPLOYED   Social Needs    Financial resource strain: Not on file    Food insecurity:     Worry: Not on file     Inability: Not on file    Transportation needs:     Medical: Not on file     Non-medical: Not on file   Tobacco Use    Smoking status: Current Every Day Smoker     Packs/day: 1.00     Years: 30.00     Pack years: 30.00     Types: Cigarettes     Start date: 12/2/1969    Smokeless tobacco: Never Used   Substance and Sexual Activity    Alcohol use: No    Drug use: No     Types: Cocaine, Heroin     Comment: +Cocaine Screen 2013    Sexual activity: Not on file     Comment: 2014   Lifestyle    Physical activity:     Days per week: Not on file     Minutes per session: Not on file    Stress: Not on file   Relationships    Social connections:     Talks on phone: Not on file     Gets together: Not on file     Attends Mandaen service: Not on file     Active member of club or organization: Not on file     Attends meetings of clubs or organizations: Not on file     Relationship status: Not on file    Intimate partner violence:     Fear of current or ex partner: Not on file     Emotionally abused: Not on file     Physically abused: Not on file     Forced sexual activity: Not on file   Other Topics Concern    Not on file   Social History Narrative    Lives with 15year old son. Blood pressure 134/81, pulse 83, temperature 97.7 °F (36.5 °C), temperature source Oral, resp.  rate 20, height 5' 5\" (1.651 m), weight 81.8 kg (180 lb 6.4 oz), last menstrual period 11/25/2012, SpO2 96 %. Physical Exam   Constitutional: She is oriented to person, place, and time. She appears well-developed and well-nourished. No distress. HENT:   Head: Normocephalic. Nose: Nose normal.   Mouth/Throat: Oropharynx is clear and moist. No oropharyngeal exudate. Eyes: Pupils are equal, round, and reactive to light. Conjunctivae are normal. Right eye exhibits no discharge. Left eye exhibits no discharge. No scleral icterus. Neck: No JVD present. No tracheal deviation present. No thyromegaly present. Cardiovascular: Normal rate, regular rhythm and intact distal pulses. Exam reveals no gallop. No murmur heard. Pulmonary/Chest: Effort normal. No stridor. No respiratory distress. She has wheezes (faint B mid lung fields). She has no rales. She exhibits no tenderness. Distant breath sounds   Abdominal: Soft. She exhibits no mass. There is no tenderness. There is no rebound. Musculoskeletal: She exhibits no tenderness or deformity. Edema: L greater than R ankle. Lymphadenopathy:     She has no cervical adenopathy. Neurological: She is alert and oriented to person, place, and time. Coordination normal.   Skin: Skin is warm and dry. No rash noted. She is not diaphoretic. No erythema. No pallor. Psychiatric: She has a normal mood and affect. Her behavior is normal. Judgment and thought content normal.       CT Results (most recent):  Results from Hospital Encounter encounter on 02/06/18   CT CHEST WO CONT    Narrative Noncontrast CT chest    CPT code 00776    CLINICAL HISTORY: Abnormal chest x-ray in a smoker    TECHNIQUE: 5 mm helical MDCT scan of the chest without contrast. Sagittal and  coronal reformations then created with original data set.  All CT scans at this  facility are performed using dose optimization techniques as appropriate to a  performed exam, to include automated exposure control, adjustment of the mA  and/or kV according to patient's size (including appropriate matching for site  specific examinations), or use of iterative reconstruction technique. COMPARISON: 7/27/2016, chest x-ray 1/26/2018    FINDINGS: Lungs: Panacinar emphysema upper lobes. Fibrotic scarring left apex. Lung cysts both lower lobes. Honeycombing right lung base, with worsening since  2016. Meagan Sow 5 mm nodule right lower lobe adjacent to pleura (34) stable since  7/27/2016. Airways: Peribronchial thickening around the elan. Bony skeleton: Old healed left anterolateral rib fractures; old pseudoarthrosis  right posterior 11th rib fracture. Healing left posterolateral fifth rib  fracture (20), new since prior study. New marked compression deformity T5. Surrounding haziness in the posterior mediastinal fat. No bony spinal stenosis  as a result. Otherwise no loss of vertebral body height. Heart and pericardium: Mild calcifications mitral annulus, left coronary artery. Great vessels: Unremarkable for age. Lymph nodes: Small lymph nodes in the middle mediastinum, not pathologically  enlarged. Pleural space: No effusions. Upper abdomen: Stable well-defined 2.4 cm hypodensity superiorly in the spleen. A 1.4 cm hypodensity inferiorly in the spleen not seen previously, measuring  water attenuation. Remainder included solid organs and hollow viscera are  unremarkable. Impression IMPRESSION: Emphysema and fibrosis at the lung bases, worsened particularly on  the right since 2016. No new infiltrate. 5 mm right lower lobe nodule stable since prior study in 2016, probably benign. New compression fracture T5 and new healing left posterolateral fifth rib  fracture. Splenic cysts including a new one inferiorly. .  10/30/19   ECHO ADULT COMPLETE 10/30/2019 10/30/2019    Narrative · Left Ventricle: Normal cavity size. Upper normal wall thickness. Hyperdynamic systolic dysfunction. Estimated left ventricular ejection   fraction is 56 - 60%.  Biplane method used to measure ejection fraction. No   regional wall motion abnormality noted. · Mitral Valve: Mitral valve non-specific thickening. Mild mitral annular   calcification. Trace mitral valve regurgitation is present. · Pulmonary Artery: There is no evidence of pulmonary hypertension. · There is no evidence of pulmonary hypertension. No significant tricuspid   regurgitation to assess pulmonary pressure. Signed by: Beena Ellington MD       ASSESSMENT and PLAN  Encounter Diagnoses   Name Primary?  COPD exacerbation ([facility] [address] Yes    Sarcoidosis     Obesity (BMI 30.0-34. 9)     Hypoxemia requiring supplemental oxygen     Interstitial lung disease ([facility] [address]      Sudden worsening in symptoms consistent with exacerbation of COPD, temporarily relieved by Prednisone and Doxy given in ED. Will start short Prednisone taper. Echo results reviewed, no evidence of pulmonary HTN  Start Trelegy instead of Anoro for inhaled CS component. Will continue to benefit from supplemental O2. Increase flow rate to 4 LNC. Will consider chronic Azithromycin for suppression on next visit  Strongly counseled on smoking cessation. RTC 4 weeks  Flu vaccine to be given after resolution of current symptoms.

## 2019-11-12 NOTE — PROGRESS NOTES
Cyndee Granado presents today for   Chief Complaint   Patient presents with    COPD     follow up from 10/15/2019    Sarcoidosis    Other     pulmonary HTN       Is someone accompanying this pt? No    Is the patient using any DME equipment during OV? Yes. O2    -D[employer]    Depression Screening:  3 most recent PHQ Screens 11/12/2019   Little interest or pleasure in doing things Not at all   Feeling down, depressed, irritable, or hopeless Not at all   Total Score PHQ 2 0       Learning Assessment:  Learning Assessment 2/20/2019   PRIMARY LEARNER Patient   BARRIERS PRIMARY LEARNER -   CO-LEARNER CAREGIVER -   PRIMARY LANGUAGE ENGLISH   LEARNER PREFERENCE PRIMARY DEMONSTRATION     -   ANSWERED BY Patient   RELATIONSHIP SELF   ASSESSMENT COMMENT -       Abuse Screening:  Abuse Screening Questionnaire 11/12/2019   Do you ever feel afraid of your partner? N   Are you in a relationship with someone who physically or mentally threatens you? N   Is it safe for you to go home? Y       Fall Risk  Fall Risk Assessment, last 12 mths 11/28/2017   Able to walk? Yes   Fall in past 12 months? Yes   Fall with injury? Yes   Number of falls in past 12 months 2   Fall Risk Score 3         Coordination of Care:  1. Have you been to the ER, urgent care clinic since your last visit? Hospitalized since your last visit? Yes; Where: [facility], When: 10/17/2019-pneumonia, COPD, sarcoidosis & narcotic withdrawal    2. Have you seen or consulted any other health care providers outside of the [address] [place] [address] since your last visit? Include any pap smears or colon screening. Yes. NP Tanner Chu. Apurva Llamas, PCP    [facility]  [address] [facility]. [address], [address]  [facility]  () [phone]      Simple walk test done in office today.  Qualifying O2 sats:     O2 Sat on 2L O2 is : 93 %, Pulse 89, SOB scale 0    Walked: 34   m on 2L O2 : 87 %, Pulse 89, SOB scale 0      68   m on 3L O2 : 90 %, Pulse 107, SOB scale 0      102 m on 3L O2 : 88 %, Pulse 100, SOB scale 0                 136 m on 4L O2 : 93 %, Pulse 88, SOB scale 0    O2 Sat on 4L O2 is : 96 %, Pulse 81, SOB scale 0    Tech comments regarding testing: Patient did simple walk and walked a total of 136 meters on O2. VSS with no C/O SOB while walking. DR. Piyush Billingsley notified.      Susie Caceres LPN

## 2019-11-19 NOTE — TELEPHONE ENCOUNTER
PT CALLED([phone]). PT NEEDS DR Carlotta Fair TO SEND A SCRIPT FOR TRELEGY PUMP TO [facility] [phone].

## 2019-11-19 NOTE — TELEPHONE ENCOUNTER
[facility] have the Trelegy rx but not covered by Medicaid.   Preferred: 1)Dulera or Fluticasone salmeterol  & 2)Atrovent HFA

## 2019-11-20 NOTE — TELEPHONE ENCOUNTER
Pt advised per order from Dr. Quirino Viera, to restart the Anoro she was on prior to the switch to Trelegy since not covered med. Pt states she still has some Anoro at home.

## 2019-11-29 NOTE — ED NOTES
Patient armband removed and shreddedI have reviewed discharge instructions with the patient. The patient verbalized understanding. Pt ambulatory with personal oxygen tank on rolling cart, family member present, pt in nad.

## 2019-11-29 NOTE — ED PROVIDER NOTES
EMERGENCY DEPARTMENT HISTORY AND PHYSICAL EXAM 
 
Date: 11/29/2019 Patient Name: Meme Figueroa History of Presenting Illness Chief Complaint Patient presents with  Constipation  Gout History Provided By: patient Chief Complaint: constipation and gout Duration: few days Timing:acute Location: L ankle, abdomen Quality: aching Severity moderate Modifying Factors: none Associated Symptoms: constipation, ankle pain and swelling Additional History (Context): Meme Figueroa is a 62 y.o. female with past medical history of bipolar affective disorder, CKD, COPD, doses, venous insufficiency, diabetes, hypothyroidism, gout, and hypertension who presents with c/o constipation over the past week, as well as gout flareup to the left ankle for the past several days. Patient states she has taken colchicine in the past for her gout flareups but states that her doctors do not give this medication too often as it can affect her kidney function. Patient states she has had bowel movements over this past week but reports that they have all been very small bowel movements that she had to strain to have. She states her abdomen \"aches from the constipation\" but denies severe abd pain. Pt denies N/V, diarrhea, fever, chills, and chest pain. Pt denies SOB at present but does wear O2 at all times secondary to her sarcoidosis. No other complaints reported at this time. PCP: Eric Valiente NP Current Outpatient Medications Medication Sig Dispense Refill  predniSONE (DELTASONE) 20 mg tablet Take 20 mg by mouth daily. With Breakfast 5 Tab 0  
 polyethylene glycol (MIRALAX) 17 gram/dose powder Take 17 g by mouth daily. 1 tablespoon with 8 oz of water daily 289 g 0  
 docusate sodium (COLACE) 100 mg capsule Take 1 Cap by mouth two (2) times a day.  30 Cap 0  
 famotidine (PEPCID) 20 mg tablet TAKE 1 TABLET BY MOUTH ONCE DAILY 15 Tab 0  
  umeclidinium-vilanterol (ANORO ELLIPTA) 62.5-25 mcg/actuation inhaler Take 1 Puff by inhalation daily. 1 Inhaler 0  
 aspirin 81 mg chewable tablet CHEW 1 TABLET DAILY WITH BREAKFAST 30 Tab 0  BD INSULIN SYRINGE ULTRA-FINE 1 mL 31 gauge x 5/16 syrg  promethazine (PHENERGAN) 25 mg tablet Take 1 Tab by mouth every six (6) hours as needed for Nausea (and withdrawl symptoms). 15 Tab 0  cloNIDine HCl (CATAPRES) 0.1 mg tablet Take 1 Tab by mouth three (3) times daily as needed (withdrawl symptoms) for up to 20 doses. 20 Tab 0  
 acetaminophen (TYLENOL) 325 mg tablet Take  by mouth every four (4) hours as needed for Pain.  famotidine (PEPCID) 20 mg tablet Take 1 Tab by mouth daily. Indications: Stress Ulcer Prevention 30 Tab 1  
 metOLazone (ZAROXOLYN) 2.5 mg tablet Take 1 Tab by mouth every fourty-eight (48) hours. 90 Tab 1  
 rosuvastatin (CRESTOR) 5 mg tablet TAKE ONE TABLET NIGHTLY 90 Tab 3  
 albuterol (PROAIR HFA) 90 mcg/actuation inhaler INHALE 2 PUFFS EVERY 4 HOURS AS NEEDED FOR WHEEZING 1 Inhaler 5  
 albuterol (PROVENTIL VENTOLIN) 2.5 mg /3 mL (0.083 %) nebulizer solution USE 1 NEB EVERY 4 HOURS FOR WHEEZING AND SHORTNESS OF BREATH 2 Package 3  
 oxybutynin (DITROPAN) 5 mg tablet Take 5 mg by mouth two (2) times a day.  allopurinol (ZYLOPRIM) 100 mg tablet Take 100 mg by mouth daily.  insulin glargine (LANTUS) 100 unit/mL injection Inject 70 units subcutaneously before breakfast (7AM) and 40 units subcutaneously after dinner (7PM). Indications: type 2 diabetes mellitus 1 Vial 0  
 insulin lispro (HUMALOG) 100 unit/mL injection To be given 15 min before meals: < 150 - None, 151-200 - 2 u, 201-250 - 4 u, 251-300 - 6 u, 301-350 - 8 u, 351-400 - 10 u, >400 - 12 u 1 Vial 0  
 levothyroxine (SYNTHROID) 100 mcg tablet Take 1 Tab by mouth Daily (before breakfast). Indications: hypothyroidism (Patient taking differently: Take 75 mcg by mouth Daily (before breakfast).  Indications: a condition with low thyroid hormone levels) 15 Tab 0  cholecalciferol (VITAMIN D3) 1,000 unit tablet Take 2 Tabs by mouth daily. Indications: PREVENTION OF VITAMIN D DEFICIENCY 30 Tab 0  
 divalproex DR (DEPAKOTE) 250 mg tablet Take 250 mg by mouth nightly. Indications: BIPOLAR DISORDER IN REMISSION  insulin syringe,safetyneedle 1 mL 30 gauge x 5/16\" syrg As directed 50 Each 0  
 ARIPiprazole (ABILIFY) 5 mg tablet Take 10 mg by mouth daily. Indications: BIPOLAR DISORDER IN REMISSION    
 gabapentin (NEURONTIN) 300 mg capsule Take 300 mg by mouth daily as needed. Indications: Neuropathic Pain  traZODone (DESYREL) 100 mg tablet Take 100 mg by mouth as needed. Indications: major depressive disorder  Nebulizer Accessories Kit Use with nebulizer machine 1 Kit prn  sertraline (ZOLOFT) 100 mg tablet Take 50 mg by mouth daily. Indications: Bipolar Depression  Oxygen-Air Delivery Systems by Nasal route continuous. 2 LPM via NC  Indications: Hypoxemia requiring supplementary oxygen Past History Past Medical History: 
Past Medical History:  
Diagnosis Date  Abnormally low high density lipoprotein (HDL) cholesterol with hypertriglyceridemia Lipid profile (11/6/2016) showed , , HDL 38, LDL 37  
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 congestive heart failure ([address] Better controlled  Bipolar affective disorder ([address] 12/5/2012  Cellulitis of right forearm 5/4/2017  Chronic back pain  Chronic lung disease  Chronic obstructive pulmonary disease (COPD) ([address] SOB, on [place] O2 Recent admission with psychosis  CKD stage G3a/A1, GFR 45-59 and albumin creatinine ratio <30 mg/g ([facility]) 5/11/2017 Urine microalbumin/Creatinine ratio (5/11/2017) = 9 mg/g  Contusion of left elbow 5/4/2017  Depression  Diabetes mellitus ([address]  Diabetic neuropathy associated with type 2 diabetes mellitus ([facility]  Diastolic dysfunction without heart failure Stable on diuretics  Falls frequently  Gastroesophageal reflux disease with hiatal hernia  Generalized osteoarthritis of multiple sites  Gout  History of acute renal failure 2013  History of back injury   
 jumped out of second story window  History of hepatitis C   
 treated  History of penicillin allergy  History of vitamin D deficiency 5/10/2017  Hyperuricemia 2017  Hypothyroidism  Hypoxemia requiring supplemental oxygen 2014  Intravenous drug user 2017  Memory difficulty  Menopause  Mixed connective tissue disease ([facility] 5/10/2017  Obesity, Class I, BMI 30-34.9  Olecranon bursitis of right elbow 2017  Recurrent genital herpes 2013  Right Achilles tendinitis 2017  Sarcoidosis  Sick[facility] cell trait [facility]  Type 2 diabetes with stage 3 chronic kidney disease GFR 30-59 ([facility])  Urge urinary incontinence 5/10/2017  Venous insufficiency  Wears glasses Past Surgical History: 
Past Surgical History:  
Procedure Laterality [facility]  HX  SECTION    
 HX CYST REMOVAL Left  Left foot  HX OTHER SURGICAL Left 2017 S/P incision and drainage of the abscess of the left hand and the left foot (2017 - Dr. Val Wiggins. Sherry Almaguer)  HX TUBAL LIGATION Family History: 
Family History Problem Relation Age of Onset  Seizures Other  Diabetes Mother  Hypertension Mother  Heart Disease Mother  Cancer Mother  Diabetes Father  Stroke Father  Heart Disease Father  Headache Sister  Depression Neg Hx  Suicide Neg Hx  Psychotic Disorder Neg Hx  Substance Abuse Neg Hx  Dementia Neg Hx Social History: 
Social History Tobacco Use  Smoking status: Current Every Day Smoker Packs/day: 1.00 Years: 30.00 Pack years: 30.00 Types: Cigarettes Start date: 12/2/1969  Smokeless tobacco: Never Used Substance Use Topics  Alcohol use: No  
 Drug use: No  
  Types: Cocaine, Heroin Comment: +Cocaine Screen 2013 Allergies: Allergies Allergen Reactions  Moxifloxacin Rash  Pcn [Penicillins] Swelling  Sulfa (Sulfonamide Antibiotics) Swelling Review of Systems Review of Systems Constitutional: Negative. Negative for chills and fever. HENT: Negative. Negative for congestion, ear pain and rhinorrhea. Eyes: Negative. Negative for pain and redness. Respiratory: Negative. Negative for cough, shortness of breath and stridor. Cardiovascular: Negative. Negative for chest pain and leg swelling. Gastrointestinal: Positive for abdominal pain and constipation. Negative for diarrhea, nausea and vomiting. Abd aching secondary to constipation, per patient Genitourinary: Negative. Negative for dysuria and frequency. Musculoskeletal: Positive for arthralgias and joint swelling. Negative for back pain and neck pain. Skin: Negative. Negative for rash and wound. Neurological: Negative. Negative for dizziness, seizures, syncope and headaches. All other systems reviewed and are negative. All Other Systems Negative Physical Exam  
 
Vitals:  
 11/29/19 1006 BP: (!) 138/93 Pulse: 89 Resp: 18 Temp: 98.1 °F (36.7 °C) SpO2: 98% Weight: 81.6 kg (180 lb) Height: 5' 5\" (1.651 m) Physical Exam 
Vitals signs and nursing note reviewed. Constitutional:   
   General: She is not in acute distress. Appearance: She is well-developed. She is not diaphoretic. HENT:  
   Head: Normocephalic and atraumatic. Eyes:  
   General: No scleral icterus. Right eye: No discharge. Left eye: No discharge. Conjunctiva/sclera: Conjunctivae normal.  
Neck: Musculoskeletal: Normal range of motion and neck supple. Cardiovascular:  
   Rate and Rhythm: Normal rate and regular rhythm. Heart sounds: Normal heart sounds. No murmur. No friction rub. No gallop. Pulmonary:  
   Effort: Pulmonary effort is normal. No respiratory distress. Breath sounds: Normal breath sounds. No stridor. No wheezing or rales. Comments: Pt wears O2 at all times, few scattered expiratory wheezes noted on auscultation Abdominal:  
   General: Bowel sounds are normal. There is no distension. Palpations: Abdomen is soft. There is no mass. Tenderness: There is no tenderness. There is no guarding. Comments: No abdominal TTP or guarding noted Musculoskeletal: Normal range of motion. Skin: 
   General: Skin is warm and dry. Findings: No erythema or rash. Neurological:  
   General: No focal deficit present. Mental Status: She is alert and oriented to person, place, and time. Coordination: Coordination normal.  
   Comments: Gait is steady and patient exhibits no evidence of ataxia. Patient is able to ambulate without difficulty. No focal neurological deficit noted. No facial droop, slurred speech, or evidence of altered mentation noted on exam.    
Psychiatric:     
   Mood and Affect: Mood normal.     
   Behavior: Behavior normal.     
   Thought Content: Thought content normal.  
 
  
 
 
 
 
Diagnostic Study Results Labs - No results found for this or any previous visit (from the past 12 hour(s)). Radiologic Studies -  
XR ABD FLAT/ ERECT Final Result IMPRESSION:  
  
No obstruction or free air. No evidence significant rectal fecal infection. CT Results  (Last 48 hours) None CXR Results  (Last 48 hours) None Medical Decision Making I am the first provider for this patient.  
 
I reviewed the vital signs, available nursing notes, past medical history, past surgical history, family history and social history. Vital Signs-Reviewed the patient's vital signs. Records Reviewed: Radha Theodore PA-C Procedures: 
Procedures Provider Notes (Medical Decision Making): Impression:  Constipation, gout X-rays negative for constipation or obstruction. Clinical presentation suggestive of gout and constipation. pt has no abd TTP or guarding on exam. Vitals are normal. No indication for labs and advanced imaging at this time. Will plan to d/c with colace, miralax, and low dose prednisone x 5 days. Glucose should be closely monitored while taking prednisone. Narcotics avoided due to constipation. Close pcp follow-up recommended this week. Pt agrees with this plan. Radha Theodore PA-C 12:14 PM   
 
MED RECONCILIATION: 
No current facility-administered medications for this encounter. Current Outpatient Medications Medication Sig  predniSONE (DELTASONE) 20 mg tablet Take 20 mg by mouth daily. With Breakfast  
 polyethylene glycol (MIRALAX) 17 gram/dose powder Take 17 g by mouth daily. 1 tablespoon with 8 oz of water daily  docusate sodium (COLACE) 100 mg capsule Take 1 Cap by mouth two (2) times a day.  famotidine (PEPCID) 20 mg tablet TAKE 1 TABLET BY MOUTH ONCE DAILY  umeclidinium-vilanterol (ANORO ELLIPTA) 62.5-25 mcg/actuation inhaler Take 1 Puff by inhalation daily.  aspirin 81 mg chewable tablet CHEW 1 TABLET DAILY WITH BREAKFAST  BD INSULIN SYRINGE ULTRA-FINE 1 mL 31 gauge x 5/16 syrg  promethazine (PHENERGAN) 25 mg tablet Take 1 Tab by mouth every six (6) hours as needed for Nausea (and withdrawl symptoms).  cloNIDine HCl (CATAPRES) 0.1 mg tablet Take 1 Tab by mouth three (3) times daily as needed (withdrawl symptoms) for up to 20 doses.  acetaminophen (TYLENOL) 325 mg tablet Take  by mouth every four (4) hours as needed for Pain.  famotidine (PEPCID) 20 mg tablet Take 1 Tab by mouth daily.  Indications: Stress Ulcer Prevention  metOLazone (ZAROXOLYN) 2.5 mg tablet Take 1 Tab by mouth every fourty-eight (48) hours.  rosuvastatin (CRESTOR) 5 mg tablet TAKE ONE TABLET NIGHTLY  albuterol (PROAIR HFA) 90 mcg/actuation inhaler INHALE 2 PUFFS EVERY 4 HOURS AS NEEDED FOR WHEEZING  
 albuterol (PROVENTIL VENTOLIN) 2.5 mg /3 mL (0.083 %) nebulizer solution USE 1 NEB EVERY 4 HOURS FOR WHEEZING AND SHORTNESS OF BREATH  
 oxybutynin (DITROPAN) 5 mg tablet Take 5 mg by mouth two (2) times a day.  allopurinol (ZYLOPRIM) 100 mg tablet Take 100 mg by mouth daily.  insulin glargine (LANTUS) 100 unit/mL injection Inject 70 units subcutaneously before breakfast (7AM) and 40 units subcutaneously after dinner (7PM). Indications: type 2 diabetes mellitus  insulin lispro (HUMALOG) 100 unit/mL injection To be given 15 min before meals: < 150 - None, 151-200 - 2 u, 201-250 - 4 u, 251-300 - 6 u, 301-350 - 8 u, 351-400 - 10 u, >400 - 12 u  levothyroxine (SYNTHROID) 100 mcg tablet Take 1 Tab by mouth Daily (before breakfast). Indications: hypothyroidism (Patient taking differently: Take 75 mcg by mouth Daily (before breakfast). Indications: a condition with low thyroid hormone levels)  cholecalciferol (VITAMIN D3) 1,000 unit tablet Take 2 Tabs by mouth daily. Indications: PREVENTION OF VITAMIN D DEFICIENCY  divalproex DR (DEPAKOTE) 250 mg tablet Take 250 mg by mouth nightly. Indications: BIPOLAR DISORDER IN REMISSION  insulin syringe,safetyneedle 1 mL 30 gauge x 5/16\" syrg As directed  ARIPiprazole (ABILIFY) 5 mg tablet Take 10 mg by mouth daily. Indications: BIPOLAR DISORDER IN REMISSION  
 gabapentin (NEURONTIN) 300 mg capsule Take 300 mg by mouth daily as needed. Indications: Neuropathic Pain  traZODone (DESYREL) 100 mg tablet Take 100 mg by mouth as needed. Indications: major depressive disorder  Nebulizer Accessories Kit Use with nebulizer machine  sertraline (ZOLOFT) 100 mg tablet Take 50 mg by mouth daily. Indications: Bipolar Depression  Oxygen-Air Delivery Systems by Nasal route continuous. 2 LPM via NC  Indications: Hypoxemia requiring supplementary oxygen Disposition: D/c 
 
DISCHARGE NOTE:  
Patient is stable for discharge at this time. I have discussed all the findings from today's work up with the patient, including lab results and imaging. I have answered all questions. Rx for colace miralax and prednisone given. Rest and close follow-up with the PCP recommended this week. Return to the ED immediately for any new or worsening symptoms. Radha Theodore PA-C 12:14 PM  
 
Follow-up Information Follow up With Specialties Details Why Contact Info Alonso Rendon NP Nurse Practitioner Schedule an appointment as soon as possible for a visit in 3 days  [address] [facility] [id number] [phone] [id number] [facility] EMERGENCY DEPT Emergency Medicine  As needed, If symptoms worsen Marleen Comer [id number] [phone] Current Discharge Medication List  
  
START taking these medications Details  
polyethylene glycol (MIRALAX) 17 gram/dose powder Take 17 g by mouth daily. 1 tablespoon with 8 oz of water daily 
Qty: 289 g, Refills: 0  
  
docusate sodium (COLACE) 100 mg capsule Take 1 Cap by mouth two (2) times a day. Qty: 30 Cap, Refills: 0 CONTINUE these medications which have CHANGED Details  
predniSONE (DELTASONE) 20 mg tablet Take 20 mg by mouth daily. With Breakfast 
Qty: 5 Tab, Refills: 0 Diagnosis Clinical Impression: 1. Constipation, unspecified constipation type 2. Acute gout involving toe of left foot, unspecified cause 3. Acute gout of left ankle, unspecified cause

## 2019-11-29 NOTE — DISCHARGE INSTRUCTIONS
Patient Education        Constipation: Care Instructions  Your Care Instructions    Constipation means that you have a hard time passing stools (bowel movements). People pass stools from 3 times a day to once every 3 days. What is normal for you may be different. Constipation may occur with pain in the rectum and cramping. The pain may get worse when you try to pass stools. Sometimes there are small amounts of bright red blood on toilet paper or the surface of stools. This is because of enlarged veins near the rectum (hemorrhoids). A few changes in your diet and lifestyle may help you avoid ongoing constipation. Your doctor may also prescribe medicine to help loosen your stool. Some medicines can cause constipation. These include pain medicines and antidepressants. Tell your doctor about all the medicines you take. Your doctor may want to make a medicine change to ease your symptoms. Follow-up care is a key part of your treatment and safety. Be sure to make and go to all appointments, and call your doctor if you are having problems. It's also a good idea to know your test results and keep a list of the medicines you take. How can you care for yourself at home? · Drink plenty of fluids, enough so that your urine is light yellow or clear like water. If you have kidney, heart, or liver disease and have to limit fluids, talk with your doctor before you increase the amount of fluids you drink. · Include high-fiber foods in your diet each day. These include fruits, vegetables, beans, and whole grains. · Get at least 30 minutes of exercise on most days of the week. Walking is a good choice. You also may want to do other activities, such as running, swimming, cycling, or playing tennis or team sports. · Take a fiber supplement, such as Citrucel or Metamucil, every day. Read and follow all instructions on the label. · Schedule time each day for a bowel movement. A daily routine may help.  Take your time having your bowel movement. · Support your feet with a small step stool when you sit on the toilet. This helps flex your hips and places your pelvis in a squatting position. · Your doctor may recommend an over-the-counter laxative to relieve your constipation. Examples are Milk of Magnesia and MiraLax. Read and follow all instructions on the label. Do not use laxatives on a long-term basis. When should you call for help? Call your doctor now or seek immediate medical care if:    · You have new or worse belly pain.     · You have new or worse nausea or vomiting.     · You have blood in your stools.    Watch closely for changes in your health, and be sure to contact your doctor if:    · Your constipation is getting worse.     · You do not get better as expected. Where can you learn more? Go to http://rick-kathya.info/. Enter 21 [id number] in the search box to learn more about \"Constipation: Care Instructions. \"  Current as of: June 26, 2019  Content Version: 12.2  © 3833-5569 [employer]. Care instructions adapted under license by Lorena Gaxiola (which disclaims liability or warranty for this information). If you have questions about a medical condition or this instruction, always ask your healthcare professional. [place] [address] any warranty or liability for your use of this information. Patient Education        Gout: Care Instructions  Your Care Instructions    Gout is a form of arthritis caused by a buildup of uric acid crystals in a joint. It causes sudden attacks of pain, swelling, redness, and stiffness, usually in one joint, especially the big toe. Gout usually comes on without a cause. But it can be brought on by drinking alcohol (especially beer) or eating seafood and red meat. Taking certain medicines, such as diuretics or aspirin, also can bring on an attack of gout.   Taking your medicines as prescribed and following up with your doctor regularly can help you avoid gout attacks in the future. Follow-up care is a key part of your treatment and safety. Be sure to make and go to all appointments, and call your doctor if you are having problems. It's also a good idea to know your test results and keep a list of the medicines you take. How can you care for yourself at home? · If the joint is swollen, put ice or a cold pack on the area for 10 to 20 minutes at a time. Put a thin cloth between the ice and your skin. · Prop up the sore limb on a pillow when you ice it or anytime you sit or lie down during the next 3 days. Try to keep it above the level of your heart. This will help reduce swelling. · Rest sore joints. Avoid activities that put weight or strain on the joints for a few days. Take short rest breaks from your regular activities during the day. · Take your medicines exactly as prescribed. Call your doctor if you think you are having a problem with your medicine. · Take pain medicines exactly as directed. ? If the doctor gave you a prescription medicine for pain, take it as prescribed. ? If you are not taking a prescription pain medicine, ask your doctor if you can take an over-the-counter medicine. · Eat less seafood and red meat. · Check with your doctor before drinking alcohol. · Losing weight, if you are overweight, may help reduce attacks of gout. But do not go on a [employer]. \" Losing a lot of weight in a short amount of time can cause a gout attack. When should you call for help? Call your doctor now or seek immediate medical care if:    · You have a fever.     · The joint is so painful you cannot use it.     · You have sudden, unexplained swelling, redness, warmth, or severe pain in one or more joints.    Watch closely for changes in your health, and be sure to contact your doctor if:    · You have joint pain.     · Your symptoms get worse or are not improving after 2 or 3 days. Where can you learn more?   Go to http://rick-kathya.info/. Enter Z326 in the search box to learn more about \"Gout: Care Instructions. \"  Current as of: April 1, 2019  Content Version: 12.2  © 9929-6472 [employer]. Care instructions adapted under license by [employer] (which disclaims liability or warranty for this information). If you have questions about a medical condition or this instruction, always ask your healthcare professional. [address] any warranty or liability for your use of this information. Patient Education        Purine-Restricted Diet: Care Instructions  Your Care Instructions    Purines are substances that are found in some foods. Your body turns purines into uric acid. High levels of uric acid can cause gout, which is a form of arthritis that causes pain and inflammation in joints. You may be able to help control the amount of uric acid in your body by limiting high-purine foods in your diet. Follow-up care is a key part of your treatment and safety. Be sure to make and go to all appointments, and call your doctor if you are having problems. It's also a good idea to know your test results and keep a list of the medicines you take. How can you care for yourself at home? · Plan your meals and snacks around foods that are low in purines and are safe for you to eat. These foods include:  ? Green vegetables and tomatoes. ? Fruits. ? Whole-grain breads, rice, and cereals. ? Eggs, peanut butter, and nuts. ? Low-fat milk, cheese, and other milk products. ? Popcorn. ? Gelatin desserts, chocolate, cocoa, and cakes and sweets, in small amounts. · You can eat certain foods that are medium-high in purines, but eat them only once in a while. These foods include:  ? Legumes, such as dried beans and dried peas. You can have 1 cup cooked legumes each day. ? Asparagus, cauliflower, spinach, mushrooms, and green peas. ?  Fish and seafood (other than very high-purine seafood). ? Oatmeal, wheat bran, and wheat germ. · Limit very high-purine foods, including:  ? Organ meats, such as liver, kidneys, sweetbreads, and brains. ? Meats, including padilla, beef, pork, and lamb. ? Game meats and any other meats in large amounts. ? Anchovies, sardines, herring, mackerel, and scallops. ? Gravy. ? Beer. Where can you learn more? Go to http://rick-kathya.info/. Enter F448 in the search box to learn more about \"Purine-Restricted Diet: Care Instructions. \"  Current as of: 2018  Content Version: 12.2  © 9870-2500 [employer]. Care instructions adapted under license by [employer] (which disclaims liability or warranty for this information). If you have questions about a medical condition or this instruction, always ask your healthcare professional. [place] [address] any warranty or liability for your use of this information. [employer] Activation    Thank you for requesting access to [employer]. Please follow the instructions below to securely access and download your online medical record. [employer] allows you to send messages to your doctor, view your test results, renew your prescriptions, schedule appointments, and more. How Do I Sign Up? 1. In your internet browser, go to [email or web address]  2. Click on the First Time User? Click Here link in the Sign In box. You will be redirect to the New Member Sign Up page. 3. Enter your [employer] Access Code exactly as it appears below. You will not need to use this code after youve completed the sign-up process. If you do not sign up before the expiration date, you must request a new code. [employer] Access Code: [email or web address]  Expires: 2019  8:35 AM (This is the date your [employer] access code will )    4. Enter the last four digits of your Social Security Number (xxxx) and Date of Birth (mm/dd/yyyy) as indicated and click Submit.  You will be taken to the next sign-up page. 5. Create a Gigabit Squaredt ID. This will be your Intapp login ID and cannot be changed, so think of one that is secure and easy to remember. 6. Create a Intapp password. You can change your password at any time. 7. Enter your Password Reset Question and Answer. This can be used at a later time if you forget your password. 8. Enter your e-mail address. You will receive e-mail notification when new information is available in [address]. 9. Click Sign Up. You can now view and download portions of your medical record. 10. Click the Download Summary menu link to download a portable copy of your medical information. Additional Information    If you have questions, please visit the Frequently Asked Questions section of the Intapp website at [email or web address] Remember, Intapp is NOT to be used for urgent needs. For medical emergencies, dial 911. Complete all medications as prescribed. Follow-up with primary care doctor in 1 week. Return to the ED immediately for any new or worsening symptoms.

## 2019-12-07 NOTE — ED NOTES
Pt resting quietly with eyes closed. Breathing easier since nebulizer treatment. Denies complaints. Family at bedside.

## 2019-12-07 NOTE — ED TRIAGE NOTES
Pt c/o cold symptoms and cough for 2 weeks. Was seen here 11/29 for constipation. Today c/o on-going abd pain and low back pain

## 2019-12-07 NOTE — ED PROVIDER NOTES
32year-old female history of diabetes, chronic pain, bipolar disorder, COPD, pulmonary sarcoid, congestive heart failure, chronic kidney disease presents for evaluation of ongoing cough and congestion. She is been having symptoms for a month or more. She is received several courses of antibiotics and had her steroids increased by her PCP. Continues to exhibit cough, shortness of breath, and wheezing. Reports pain with cough noting that it hurts from her chest \"all the way down to my legs\". No fever reported. Cough is bringing up small amounts of \"beige\" sputum. Patient reports increasing low back pain. Pain is chronic in nature not well controlled. She is currently on methadone has been applying heat to the low back without improvement. Had recent fall but did not fall onto the back itself. Past Medical History:  
Diagnosis Date  Abnormally low high density lipoprotein (HDL) cholesterol with hypertriglyceridemia Lipid profile (11/6/2016) showed , , HDL 38, LDL 37  
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 congestive heart failure ([address] Better controlled  Bipolar affective disorder ([address] 12/5/2012  Cellulitis of right forearm 5/4/2017  Chronic back pain  Chronic lung disease  Chronic obstructive pulmonary disease (COPD) ([address] SOB, on paula O2 Recent admission with psychosis  CKD stage G3a/A1, GFR 45-59 and albumin creatinine ratio <30 mg/g ([facility]) 5/11/2017 Urine microalbumin/Creatinine ratio (5/11/2017) = 9 mg/g  Contusion of left elbow 5/4/2017  Depression  Diabetes mellitus ([address]  Diabetic neuropathy associated with type 2 diabetes mellitus ([address]  Diastolic dysfunction without heart failure Stable on diuretics  Falls frequently  Gastroesophageal reflux disease with hiatal hernia  Generalized osteoarthritis of multiple sites  Gout  History of acute renal failure 2013  History of back injury   
 jumped out of second story window  History of hepatitis C   
 treated  History of penicillin allergy  History of vitamin D deficiency 5/10/2017  Hyperuricemia 2017  Hypothyroidism  Hypoxemia requiring supplemental oxygen 2014  Intravenous drug user 2017  Memory difficulty  Menopause  Mixed connective tissue disease ([facility] 5/10/2017  Obesity, Class I, BMI 30-34.9  Olecranon bursitis of right elbow 2017  Recurrent genital herpes 2013  Right Achilles tendinitis 2017  Sarcoidosis  Sickle cell trait [facility]  Type 2 diabetes with stage 3 chronic kidney disease GFR 30-59 ([facility])  Urge urinary incontinence 5/10/2017  Venous insufficiency  Wears glasses Past Surgical History:  
Procedure Laterality Date [facility]  HX  SECTION    
 HX CYST REMOVAL Left  Left foot  HX OTHER SURGICAL Left 2017 S/P incision and drainage of the abscess of the left hand and the left foot (2017 - Dr. Rk Monteiro)  HX TUBAL LIGATION Family History:  
Problem Relation Age of Onset  Seizures Other  Diabetes Mother  Hypertension Mother  Heart Disease Mother  Cancer Mother  Diabetes Father  Stroke Father  Heart Disease Father  Headache Sister  Depression Neg Hx  Suicide Neg Hx  Psychotic Disorder Neg Hx  Substance Abuse Neg Hx  Dementia Neg Hx Social History Socioeconomic History  Marital status: SINGLE Spouse name: Not on file  Number of children: Not on file  Years of education: Not on file  Highest education level: Not on file Occupational History  Occupation: Not employed Employer: NOT EMPLOYED Social Needs  Financial resource strain: Not on file  Food insecurity:  
  Worry: Not on file Inability: Not on file  Transportation needs:  
  Medical: Not on file Non-medical: Not on file Tobacco Use  Smoking status: Current Every Day Smoker Packs/day: 1.00 Years: 30.00 Pack years: 30.00 Types: Cigarettes Start date: 12/2/1969  Smokeless tobacco: Never Used Substance and Sexual Activity  Alcohol use: No  
 Drug use: No  
  Types: Cocaine, Heroin Comment: +Cocaine Screen 2013  Sexual activity: Not on file Comment: 2014 Lifestyle  Physical activity:  
  Days per week: Not on file Minutes per session: Not on file  Stress: Not on file Relationships  Social connections:  
  Talks on phone: Not on file Gets together: Not on file Attends Jain service: Not on file Active member of club or organization: Not on file Attends meetings of clubs or organizations: Not on file Relationship status: Not on file  Intimate partner violence:  
  Fear of current or ex partner: Not on file Emotionally abused: Not on file Physically abused: Not on file Forced sexual activity: Not on file Other Topics Concern  Not on file Social History Narrative Lives with 15year old son. ALLERGIES: Moxifloxacin; Pcn [penicillins]; and Sulfa (sulfonamide antibiotics) Review of Systems Constitutional: Negative for fever. HENT: Positive for congestion. Respiratory: Positive for shortness of breath and wheezing. Cardiovascular: Positive for chest pain. Gastrointestinal: Negative for vomiting. Musculoskeletal: Positive for back pain (chronic). Neurological: Negative for headaches. All other systems reviewed and are negative. Vitals:  
 12/07/19 1509 BP: 133/82 Pulse: 100 Resp: 20 Temp: 99.1 °F (37.3 °C) SpO2: 97% Weight: 81.6 kg (180 lb) Height: 5' 5\" (1.651 m) Physical Exam 
Vitals signs and nursing note reviewed. Constitutional: General: She is not in acute distress. Appearance: She is well-developed. HENT:  
   Head: Normocephalic and atraumatic. Right Ear: Tympanic membrane normal.  
   Left Ear: Tympanic membrane normal.  
   Nose: No congestion. Mouth/Throat:  
   Pharynx: No oropharyngeal exudate. Eyes:  
   General: No scleral icterus. Cardiovascular:  
   Rate and Rhythm: Normal rate and regular rhythm. Pulmonary:  
   Effort: Pulmonary effort is normal.  
   Breath sounds: Wheezing present. Abdominal:  
   Tenderness: There is no tenderness. There is no guarding or rebound. Musculoskeletal:     
   General: No swelling. Skin: 
   General: Skin is warm and dry. Neurological:  
   Mental Status: She is alert and oriented to person, place, and time. XR CHEST PA LAT Final Result IMPRESSION:  
  
No new findings. Stable chronic interstitial lung process consistent with COPD  
and fibrosis as seen on prior CT.  
  
 
 
MDM Number of Diagnoses or Management Options Acute exacerbation of chronic obstructive pulmonary disease (COPD) ([facility]):  
Chronic midline low back pain, unspecified whether sciatica present:  
Pulmonary sarcoidosis [facility]):  
Diagnosis management comments: Impression: Increasing respiratory distress with cough. Consider COPD exacerbation, infectious bronchitis, pneumonia, CHF. Chest x-ray with chronic interstitial findings. Patient medicated with beta agonist nebulizer treatment. Old records reviewed. Patient improved with above interventions. Procedures Diagnosis: 1. Acute exacerbation of chronic obstructive pulmonary disease (COPD) ([facility] 2. Pulmonary sarcoidosis ([facility] 3. Chronic midline low back pain, unspecified whether sciatica present 1. Increase prednisone to 60 mg and taper as directed (do not need to take Medrol Dosepak while on prednisone). 2.  Skelaxin for back spasms as directed. 3. Apply ice packs to the low back for treatment of spasm. 4.  Continue nebulizer treatments for wheezing. 5.  Follow-up with your primary care physician in 2 to 3 days for recheck. 6.  Return to emergency department if acutely worse. 7.  Chest x-ray appears unchanged from prior but does have chronic lung disease changes present. Disposition: [facility] Follow-up Information Follow up With Specialties Details Why Contact Prudencio Jesus NP Nurse Practitioner In 3 days for recheck of ongoing symptoms [address] [facility] [address] [phone] [id number] [facility] EMERGENCY DEPT Emergency Medicine  As needed, If symptoms worsen Marleen Espinoza [id number] [phone] Patient's Medications Start Taking METAXALONE (SKELAXIN) 800 MG TABLET    Take 1 Tab by mouth four (4) times daily for 5 days. Indications: muscle spasm PREDNISONE (DELTASONE) 20 MG TABLET    Take 60 mg by mouth daily (with breakfast). 60 MG DAILY X 3 DAYS, THEN 40 MG DAILY X 3 DAYS, THEN 20 MG DAILY X 3 DAYS Continue Taking ACETAMINOPHEN (TYLENOL) 325 MG TABLET    Take  by mouth every four (4) hours as needed for Pain. ALBUTEROL (PROAIR HFA) 90 MCG/ACTUATION INHALER    INHALE 2 PUFFS EVERY 4 HOURS AS NEEDED FOR WHEEZING  
 ALBUTEROL (PROVENTIL VENTOLIN) 2.5 MG /3 ML (0.083 %) NEBULIZER SOLUTION    USE 1 NEB EVERY 4 HOURS FOR WHEEZING AND SHORTNESS OF BREATH ALLOPURINOL (ZYLOPRIM) 100 MG TABLET    Take 100 mg by mouth daily. ARIPIPRAZOLE (ABILIFY) 5 MG TABLET    Take 10 mg by mouth daily. Indications: BIPOLAR DISORDER IN REMISSION  
 ASPIRIN 81 MG CHEWABLE TABLET    CHEW 1 TABLET DAILY WITH BREAKFAST  
 BD INSULIN SYRINGE ULTRA-FINE 1 ML 31 GAUGE X 5/16 SYRG      
 CHOLECALCIFEROL (VITAMIN D3) 1,000 UNIT TABLET    Take 2 Tabs by mouth daily.  Indications: PREVENTION OF VITAMIN D DEFICIENCY  
 CLONIDINE HCL (CATAPRES) 0.1 MG TABLET    Take 1 Tab by mouth three (3) times daily as needed (withdrawl symptoms) for up to 20 doses. DIVALPROEX DR (DEPAKOTE) 250 MG TABLET    Take 250 mg by mouth nightly. Indications: BIPOLAR DISORDER IN REMISSION  
 DOCUSATE SODIUM (COLACE) 100 MG CAPSULE    Take 1 Cap by mouth two (2) times a day. FAMOTIDINE (PEPCID) 20 MG TABLET    Take 1 Tab by mouth daily. Indications: Stress Ulcer Prevention FAMOTIDINE (PEPCID) 20 MG TABLET    TAKE 1 TABLET BY MOUTH ONCE DAILY  
 GABAPENTIN (NEURONTIN) 300 MG CAPSULE    Take 300 mg by mouth daily as needed. Indications: Neuropathic Pain INSULIN GLARGINE (LANTUS) 100 UNIT/ML INJECTION    Inject 70 units subcutaneously before breakfast (7AM) and 40 units subcutaneously after dinner (7PM). Indications: type 2 diabetes mellitus INSULIN LISPRO (HUMALOG) 100 UNIT/ML INJECTION    To be given 15 min before meals: < 150 - None, 151-200 - 2 u, 201-250 - 4 u, 251-300 - 6 u, 301-350 - 8 u, 351-400 - 10 u, >400 - 12 u INSULIN SYRINGE,SAFETYNEEDLE 1 ML 30 GAUGE X 5/16\" SYRG    As directed LEVOTHYROXINE (SYNTHROID) 100 MCG TABLET    Take 1 Tab by mouth Daily (before breakfast). Indications: hypothyroidism METOLAZONE (ZAROXOLYN) 2.5 MG TABLET    Take 1 Tab by mouth every fourty-eight (48) hours. NEBULIZER ACCESSORIES KIT    Use with nebulizer machine OXYBUTYNIN (DITROPAN) 5 MG TABLET    Take 5 mg by mouth two (2) times a day. OXYGEN-AIR DELIVERY SYSTEMS    by Nasal route continuous. 2 LPM via NC  Indications: Hypoxemia requiring supplementary oxygen POLYETHYLENE GLYCOL (MIRALAX) 17 GRAM/DOSE POWDER    Take 17 g by mouth daily. 1 tablespoon with 8 oz of water daily PROMETHAZINE (PHENERGAN) 25 MG TABLET    Take 1 Tab by mouth every six (6) hours as needed for Nausea (and withdrawl symptoms). ROSUVASTATIN (CRESTOR) 5 MG TABLET    TAKE ONE TABLET NIGHTLY SERTRALINE (ZOLOFT) 100 MG TABLET    Take 50 mg by mouth daily. Indications: Bipolar Depression TRAZODONE (DESYREL) 100 MG TABLET    Take 100 mg by mouth as needed. Indications: major depressive disorder UMECLIDINIUM-VILANTEROL (ANORO ELLIPTA) 62.5-25 MCG/ACTUATION INHALER    Take 1 Puff by inhalation daily. These Medications have changed No medications on file Stop Taking PREDNISONE (DELTASONE) 20 MG TABLET    Take 20 mg by mouth daily.  With Breakfast

## 2019-12-08 NOTE — DISCHARGE INSTRUCTIONS
1.  Increase prednisone to 60 mg and taper as directed (do not need to take Medrol Dosepak while on prednisone). 2.  Skelaxin for back spasms as directed. 3. Apply ice packs to the low back for treatment of spasm. 4.  Continue nebulizer treatments for wheezing. 5.  Follow-up with your primary care physician in 2 to 3 days for recheck. 6.  Return to emergency department if acutely worse. 7.  Chest x-ray appears unchanged from prior but does have chronic lung disease changes present. Patient Education        Learning About Relief for Back Pain  What is back tension and strain? Back strain happens when you overstretch, or pull, a muscle in your back. You may hurt your back in an accident or when you exercise or lift something. Most back pain will get better with rest and time. You can take care of yourself at home to help your back heal.  What can you do first to relieve back pain? When you first feel back pain, try these steps:  · Walk. Take a short walk (10 to 20 minutes) on a level surface (no slopes, hills, or stairs) every 2 to 3 hours. Walk only distances you can manage without pain, especially leg pain. · Relax. Find a comfortable position for rest. Some people are comfortable on the floor or a medium-firm bed with a small pillow under their head and another under their knees. Some people prefer to lie on their side with a pillow between their knees. Don't stay in one position for too long. · Try heat or ice. Try using a heating pad on a low or medium setting, or take a warm shower, for 15 to 20 minutes every 2 to 3 hours. Or you can buy single-use heat wraps that last up to 8 hours. You can also try an ice pack for 10 to 15 minutes every 2 to 3 hours. You can use an ice pack or a bag of frozen vegetables wrapped in a thin towel. There is not strong evidence that either heat or ice will help, but you can try them to see if they help.  You may also want to try switching between heat and cold.  · Take pain medicine exactly as directed. ¨ If the doctor gave you a prescription medicine for pain, take it as prescribed. ¨ If you are not taking a prescription pain medicine, ask your doctor if you can take an over-the-counter medicine. What else can you do? · Stretch and exercise. Exercises that increase flexibility may relieve your pain and make it easier for your muscles to keep your spine in a good, neutral position. And don't forget to keep walking. · Do self-massage. You can use self-massage to unwind after work or school or to energize yourself in the morning. You can easily massage your feet, hands, or neck. Self-massage works best if you are in comfortable clothes and are sitting or lying in a comfortable position. Use oil or lotion to massage bare skin. · Reduce stress. Back pain can lead to a vicious [place]: Distress about the pain tenses the muscles in your back, which in turn causes more pain. Learn how to relax your mind and your muscles to lower your stress. Where can you learn more? Go to http://rick-kathya.info/. Enter E681 in the search box to learn more about \"Learning About Relief for Back Pain. \"  Current as of: March 21, 2017  Content Version: 11.5  © 9843-3023 [employer]. Care instructions adapted under license by [employer] (which disclaims liability or warranty for this information). If you have questions about a medical condition or this instruction, always ask your healthcare professional. [place] [address] any warranty or liability for your use of this information.

## 2019-12-08 NOTE — ED NOTES
Both written and verbal discharge instructions given to pt with verbalized understanding of home care and follow up. Prescriptions given. Pt has a ride home with her daughter.

## 2019-12-10 NOTE — ED PROVIDER NOTES
EMERGENCY DEPARTMENT HISTORY AND PHYSICAL EXAM 
 
Date: 12/10/2019 Patient Name: Jo Naylor History of Presenting Illness Chief Complaint Patient presents with  Fatigue History Provided By: Patient 5:05 PM 
Jo Naylor is a 62 y.o. female with PMHX of hypertension, sarcoidosis, COPD on chronic O2 by nasal cannula, chronic pain on methadone, gout, diabetes, CKD who presents to the emergency department C/O progressively worsening bilateral lower extremity 3 days. Patient states she was seen at [facility] ED 3 days ago for increasing cough and congestion with sputum production x1 month. She also complained of low back pain which is chronic for her but seem to be worsening and attributed to the cough. She was recently prescribed steroid tapers for gout and COPD exacerbation. She is also given Skelaxin 3 days ago which she has been taking without relief.  3 days ago she developed worsening bilateral lower extremity symmetric weakness, so discontinued the Skelaxin without improvement. Today she has not been able to ambulate or bear weight at all. Pt denies lower extremity sensation changes, saddle anesthesia, bowel or bladder incontinence, abdominal pain, new injury, trauma or fall, and any other sxs or complaints. PCP: Kulwant Rosario NP Current Facility-Administered Medications Medication Dose Route Frequency Provider Last Rate Last Dose  potassium chloride 10 mEq in 100 ml IVPB  10 mEq IntraVENous Q1H Nhi Pittman PA      
 0.9% sodium chloride infusion  100 mL/hr IntraVENous CONTINUOUS Nhi Pittman PA      
 0.9% sodium chloride infusion  100 mL/hr IntraVENous CONTINUOUS Nhi Pittman  mL/hr at 12/11/19 0348 100 mL/hr at 12/11/19 0348 Current Outpatient Medications Medication Sig Dispense Refill  predniSONE (DELTASONE) 20 mg tablet Take 60 mg by mouth daily (with breakfast).  60 MG DAILY X 3 DAYS, THEN 40 MG DAILY X 3 DAYS, THEN 20 MG DAILY X 3 DAYS 18 Tab 0  
 metaxalone (SKELAXIN) 800 mg tablet Take 1 Tab by mouth four (4) times daily for 5 days. Indications: muscle spasm 20 Tab 0  
 polyethylene glycol (MIRALAX) 17 gram/dose powder Take 17 g by mouth daily. 1 tablespoon with 8 oz of water daily 289 g 0  
 docusate sodium (COLACE) 100 mg capsule Take 1 Cap by mouth two (2) times a day. 30 Cap 0  
 famotidine (PEPCID) 20 mg tablet TAKE 1 TABLET BY MOUTH ONCE DAILY 15 Tab 0  
 umeclidinium-vilanterol (ANORO ELLIPTA) 62.5-25 mcg/actuation inhaler Take 1 Puff by inhalation daily. 1 Inhaler 0  
 aspirin 81 mg chewable tablet CHEW 1 TABLET DAILY WITH BREAKFAST 30 Tab 0  BD INSULIN SYRINGE ULTRA-FINE 1 mL 31 gauge x 5/16 syrg  promethazine (PHENERGAN) 25 mg tablet Take 1 Tab by mouth every six (6) hours as needed for Nausea (and withdrawl symptoms). 15 Tab 0  cloNIDine HCl (CATAPRES) 0.1 mg tablet Take 1 Tab by mouth three (3) times daily as needed (withdrawl symptoms) for up to 20 doses. 20 Tab 0  
 acetaminophen (TYLENOL) 325 mg tablet Take  by mouth every four (4) hours as needed for Pain.  famotidine (PEPCID) 20 mg tablet Take 1 Tab by mouth daily. Indications: Stress Ulcer Prevention 30 Tab 1  
 metOLazone (ZAROXOLYN) 2.5 mg tablet Take 1 Tab by mouth every fourty-eight (48) hours. 90 Tab 1  
 rosuvastatin (CRESTOR) 5 mg tablet TAKE ONE TABLET NIGHTLY 90 Tab 3  
 albuterol (PROAIR HFA) 90 mcg/actuation inhaler INHALE 2 PUFFS EVERY 4 HOURS AS NEEDED FOR WHEEZING 1 Inhaler 5  
 albuterol (PROVENTIL VENTOLIN) 2.5 mg /3 mL (0.083 %) nebulizer solution USE 1 NEB EVERY 4 HOURS FOR WHEEZING AND SHORTNESS OF BREATH 2 Package 3  
 oxybutynin (DITROPAN) 5 mg tablet Take 5 mg by mouth two (2) times a day.  allopurinol (ZYLOPRIM) 100 mg tablet Take 100 mg by mouth daily.     
 insulin glargine (LANTUS) 100 unit/mL injection Inject 70 units subcutaneously before breakfast (7AM) and 40 units subcutaneously after dinner (7PM). Indications: type 2 diabetes mellitus 1 Vial 0  
 insulin lispro (HUMALOG) 100 unit/mL injection To be given 15 min before meals: < 150 - None, 151-200 - 2 u, 201-250 - 4 u, 251-300 - 6 u, 301-350 - 8 u, 351-400 - 10 u, >400 - 12 u 1 Vial 0  
 levothyroxine (SYNTHROID) 100 mcg tablet Take 1 Tab by mouth Daily (before breakfast). Indications: hypothyroidism (Patient taking differently: Take 75 mcg by mouth Daily (before breakfast). Indications: a condition with low thyroid hormone levels) 15 Tab 0  cholecalciferol (VITAMIN D3) 1,000 unit tablet Take 2 Tabs by mouth daily. Indications: PREVENTION OF VITAMIN D DEFICIENCY 30 Tab 0  
 divalproex DR (DEPAKOTE) 250 mg tablet Take 250 mg by mouth nightly. Indications: BIPOLAR DISORDER IN REMISSION  insulin syringe,safetyneedle 1 mL 30 gauge x 5/16\" syrg As directed 50 Each 0  
 ARIPiprazole (ABILIFY) 5 mg tablet Take 10 mg by mouth daily. Indications: BIPOLAR DISORDER IN REMISSION    
 gabapentin (NEURONTIN) 300 mg capsule Take 300 mg by mouth daily as needed. Indications: Neuropathic Pain  traZODone (DESYREL) 100 mg tablet Take 100 mg by mouth as needed. Indications: major depressive disorder  Nebulizer Accessories Kit Use with nebulizer machine 1 Kit prn  sertraline (ZOLOFT) 100 mg tablet Take 50 mg by mouth daily. Indications: Bipolar Depression  Oxygen-Air Delivery Systems by Nasal route continuous. 2 LPM via NC  Indications: Hypoxemia requiring supplementary oxygen Past History Past Medical History: 
Past Medical History:  
Diagnosis Date  Abnormally low high density lipoprotein (HDL) cholesterol with hypertriglyceridemia Lipid profile (11/6/2016) showed , , HDL 38, LDL 37  
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 congestive heart failure ([facility] [address] Better controlled  Bipolar affective disorder ([facility] [address] 12/5/2012  Cellulitis of right forearm 2017  Chronic back pain  Chronic lung disease  Chronic obstructive pulmonary disease (COPD) ([address] SOB, on paula O2 Recent admission with psychosis  CKD stage G3a/A1, GFR 45-59 and albumin creatinine ratio <30 mg/g ([facility]) 2017 Urine microalbumin/Creatinine ratio (2017) = 9 mg/g  Contusion of left elbow 2017  Depression  Diabetes mellitus ([address]  Diabetic neuropathy associated with type 2 diabetes mellitus ([address]  Diastolic dysfunction without heart failure Stable on diuretics  Falls frequently  Gastroesophageal reflux disease with hiatal hernia  Generalized osteoarthritis of multiple sites  Gout  History of acute renal failure 2013  History of back injury   
 jumped out of second story window  History of hepatitis C   
 treated  History of penicillin allergy  History of vitamin D deficiency 5/10/2017  Hyperuricemia 2017  Hypothyroidism  Hypoxemia requiring supplemental oxygen 2014  Intravenous drug user 2017  Memory difficulty  Menopause  Mixed connective tissue disease ([address] 5/10/2017  Obesity, Class I, BMI 30-34.9  Olecranon bursitis of right elbow 2017  Recurrent genital herpes 2013  Right Achilles tendinitis 2017  Sarcoidosis  Sickle cell trait ([address]  Type 2 diabetes with stage 3 chronic kidney disease GFR 30-59 ([facility])  Urge urinary incontinence 5/10/2017  Venous insufficiency  Wears glasses Past Surgical History: 
Past Surgical History:  
[facility]  [facility]  SECTION    
 HX CYST REMOVAL Left  Left foot  HX OTHER SURGICAL Left 2017 S/P incision and drainage of the abscess of the left hand and the left foot (2017 - Dr. Monica Cesar. Benitez Hunt)  HX TUBAL LIGATION Family History: 
Family History Problem Relation Age of Onset  Seizures Other  Diabetes Mother  Hypertension Mother  Heart Disease Mother  Cancer Mother  Diabetes Father  Stroke Father  Heart Disease Father  Headache Sister  Depression Neg Hx  Suicide Neg Hx  Psychotic Disorder Neg Hx  Substance Abuse Neg Hx  Dementia Neg Hx Social History: 
Social History Tobacco Use  Smoking status: Current Every Day Smoker Packs/day: 1.00 Years: 30.00 Pack years: 30.00 Types: Cigarettes Start date: 12/2/1969  Smokeless tobacco: Never Used Substance Use Topics  Alcohol use: No  
 Drug use: No  
  Types: Cocaine, Heroin Comment: +Cocaine Screen 2013 Allergies: Allergies Allergen Reactions  Moxifloxacin Rash  Pcn [Penicillins] Swelling  Sulfa (Sulfonamide Antibiotics) Swelling Review of Systems Review of Systems Constitutional: Negative for fever. Respiratory: Positive for cough. Gastrointestinal: Negative for abdominal pain, diarrhea, nausea and vomiting. Musculoskeletal: Negative. Skin: Negative. Neurological: Positive for weakness. Negative for numbness. All other systems reviewed and are negative. Physical Exam  
 
Vitals:  
 12/10/19 2130 12/10/19 2230 12/10/19 2300 12/11/19 0352 BP: 124/76 (!) 131/92 120/90 129/74 Pulse:      
Resp:      
Temp:      
SpO2: 93% 100% Physical Exam 
Vital signs and nursing notes reviewed. CONSTITUTIONAL: Alert. Well-appearing; well-nourished; in no apparent distress. HEAD: Normocephalic; atraumatic. CV: Normal S1, S2; no murmurs, rubs, or gallops. No chest wall tenderness. RESPIRATORY: Normal chest excursion with respiration; breath sounds clear and equal bilaterally; no wheezes, rhonchi, or rales. GI: Normal bowel sounds; non-distended; non-tender. BACK:  No evidence of trauma or deformity. Non-tender to palpation.  FROM without difficulty. Negative straight leg raise bilaterally. EXT: Normal ROM in BUE. Unable to move or straight leg raise bilateral lower extremities due to weakness, will retract/pull away slightly in response to painful stimuli to foot. Distal sensation intact. 2+ DP pulses. SKIN: Normal for age and race; warm; dry; good turgor; no apparent lesions or exudate. NEURO: A & O x3. Sensation intact. PSYCH:  Mood and affect appropriate. Diagnostic Study Results Labs - Recent Results (from the past 12 hour(s)) EKG, 12 LEAD, INITIAL Collection Time: 12/10/19  4:34 PM  
Result Value Ref Range Ventricular Rate 71 BPM  
 Atrial Rate 71 BPM  
 P-R Interval 138 ms QRS Duration 86 ms  
 Q-T Interval 440 ms QTC Calculation (Bezet) 478 ms Calculated P Axis 55 degrees Calculated R Axis 52 degrees Calculated T Axis 65 degrees Diagnosis Normal sinus rhythm Possible Left atrial enlargement Septal infarct , age undetermined Abnormal ECG When compared with ECG of 17-OCT-2019 17:18, 
Criteria for Inferior infarct are no longer present T wave inversion no longer evident in Inferior leads Confirmed by Hira Headley MD, ----- ([phone]) on 12/10/2019 6:44:54 PM 
  
CBC WITH AUTOMATED DIFF Collection Time: 12/10/19  5:17 PM  
Result Value Ref Range WBC 10.4 4.6 - 13.2 K/uL  
 RBC 4.91 4.20 - 5.30 M/uL  
 HGB 13.7 12.0 - 16.0 g/dL HCT 39.6 35.0 - 45.0 % MCV 80.7 74.0 - 97.0 FL  
 MCH 27.9 24.0 - 34.0 PG  
 MCHC 34.6 31.0 - 37.0 g/dL  
 RDW 14.5 11.6 - 14.5 % PLATELET 419 423 - 392 K/uL MPV 9.1 (L) 9.2 - 11.8 FL  
 NEUTROPHILS 82 (H) 40 - 73 % LYMPHOCYTES 14 (L) 21 - 52 % MONOCYTES 4 3 - 10 % EOSINOPHILS 0 0 - 5 % BASOPHILS 0 0 - 2 %  
 ABS. NEUTROPHILS 8.6 (H) 1.8 - 8.0 K/UL  
 ABS. LYMPHOCYTES 1.4 0.9 - 3.6 K/UL  
 ABS. MONOCYTES 0.4 0.05 - 1.2 K/UL  
 ABS. EOSINOPHILS 0.0 0.0 - 0.4 K/UL  
 ABS. BASOPHILS 0.0 0.0 - 0.1 K/UL  
 DF AUTOMATED METABOLIC PANEL, COMPREHENSIVE Collection Time: 12/10/19  5:17 PM  
Result Value Ref Range Sodium 120 (L) 136 - 145 mmol/L Potassium 3.7 3.5 - 5.5 mmol/L Chloride 79 (L) 100 - 111 mmol/L  
 CO2 36 (H) 21 - 32 mmol/L Anion gap 5 3.0 - 18 mmol/L Glucose 161 (H) 74 - 99 mg/dL BUN 21 (H) 7.0 - 18 MG/DL Creatinine 1.04 0.6 - 1.3 MG/DL  
 BUN/Creatinine ratio 20 12 - 20 GFR est AA >60 >60 ml/min/1.73m2 GFR est non-AA 54 (L) >60 ml/min/1.73m2 Calcium 9.1 8.5 - 10.1 MG/DL Bilirubin, total 0.7 0.2 - 1.0 MG/DL  
 ALT (SGPT) 32 13 - 56 U/L  
 AST (SGOT) 27 10 - 38 U/L Alk. phosphatase 108 45 - 117 U/L Protein, total 8.1 6.4 - 8.2 g/dL Albumin 3.1 (L) 3.4 - 5.0 g/dL Globulin 5.0 (H) 2.0 - 4.0 g/dL A-G Ratio 0.6 (L) 0.8 - 1.7 CK Collection Time: 12/10/19  5:17 PM  
Result Value Ref Range  26 - 192 U/L  
MAGNESIUM Collection Time: 12/10/19  5:17 PM  
Result Value Ref Range Magnesium 1.7 1.6 - 2.6 mg/dL URINALYSIS W/ RFLX MICROSCOPIC Collection Time: 12/10/19 10:50 PM  
Result Value Ref Range Color YELLOW Appearance CLEAR Specific gravity 1.008 1.005 - 1.030    
 pH (UA) 6.5 5.0 - 8.0 Protein TRACE (A) NEG mg/dL Glucose NEGATIVE  NEG mg/dL Ketone NEGATIVE  NEG mg/dL Bilirubin NEGATIVE  NEG Blood NEGATIVE  NEG Urobilinogen 1.0 0.2 - 1.0 EU/dL Nitrites NEGATIVE  NEG Leukocyte Esterase NEGATIVE  NEG    
URINE MICROSCOPIC ONLY Collection Time: 12/10/19 10:50 PM  
Result Value Ref Range WBC 0 to 2 0 - 4 /hpf  
 RBC 0 to 2 0 - 5 /hpf Epithelial cells FEW 0 - 5 /lpf METABOLIC PANEL, BASIC Collection Time: 12/11/19  3:40 AM  
Result Value Ref Range Sodium 128 (L) 136 - 145 mmol/L Potassium 2.8 (LL) 3.5 - 5.5 mmol/L Chloride 86 (L) 100 - 111 mmol/L  
 CO2 37 (H) 21 - 32 mmol/L Anion gap 5 3.0 - 18 mmol/L Glucose 109 (H) 74 - 99 mg/dL  BUN 21 (H) 7.0 - 18 MG/DL  
 Creatinine 0.82 0.6 - 1.3 MG/DL  
 BUN/Creatinine ratio 26 (H) 12 - 20 GFR est AA >60 >60 ml/min/1.73m2 GFR est non-AA >60 >60 ml/min/1.73m2 Calcium 9.1 8.5 - 10.1 MG/DL Radiologic Studies -  
CT HEAD WO CONT Final Result IMPRESSION[de-identified]  
1.  No acute intracranial hemorrhage or territorial infarct. No mass effect.   
-Unremarkable for age Thank you for this referral.  
  
MRI LUMB SPINE WO CONT Final Result Addendum 1 of 1 Addendum: EXAMINATION:  
  
ANDRE Pittman called to discuss case at roughly 2245 hours. Additional subtle   
finding  
of left paraspinous muscle edema at T10 level, noted at field-of-view   
edge,  
incompletely evaluated. Contrast-enhanced thoracic spine MRI may be useful   
if  
clinically warranted. Final  
  
XR SPINE LUMB 2 OR 3 V Final Result IMPRESSION:  
  
No clearly acute findings. See details above. See follow-up MRI lumbar spine  
report. XR CHEST SNGL V Final Result IMPRESSION:  
  
Prominent pulmonary interstitium likely at least partly due to chronic  
interstitial lung disease. Component of interstitial infiltrate/edema difficult  
to completely exclude. No confluent consolidation. MRI [facility] SPINE W WO CONT    (Results Pending) MRI CERV SPINE W WO CONT    (Results Pending) CT Results  (Last 48 hours) 12/10/19 2964  CT HEAD WO CONT Final result Impression:  IMPRESSION[de-identified]  
1.  No acute intracranial hemorrhage or territorial infarct. No mass effect.   
-Unremarkable for age Thank you for this referral.  
  
 Narrative:  CT HEAD WO CONT History: Abnormal gait (ataxia); Visual loss, sudden onset ; leg weakness,  
unable to move legs Technique: 5 mm axial images acquired through the brain.   
   
CT scans at this facility are performed using dose optimization technique as  
appropriate with performed exam, to include automated exposure control,  
 adjustment of mA and/or kV according to patient's size (including appropriate  
matching for site-specific examinations), or use of iterative reconstruction  
technique. Comparison: May 2013 Findings: Motion degraded imaging Soft tissues: No significant findings. Skull base/calvarium: Unremarkable. Brain: There is no acute intracranial hemorrhage or territorial infarction. Minor amount of scattered white matter lucencies, periventricular and  
subcortical.    
   
Ventricles and cisterns: Unremarkable for age. Orbits: Unremarkable. Paranasal Sinuses: Clear Other findings: None CXR Results  (Last 48 hours) 12/10/19 1834  XR CHEST SNGL V Final result Impression:  IMPRESSION:  
   
Prominent pulmonary interstitium likely at least partly due to chronic  
interstitial lung disease. Component of interstitial infiltrate/edema difficult  
to completely exclude. No confluent consolidation. Narrative:  EXAMINATION: Chest single view INDICATION: Cough, one month duration COMPARISON: 12/7/2019 FINDINGS: Single frontal view the chest obtained. Low lung volumes and  
underpenetration limits evaluation. Mediastinal silhouette normal in size. Prominent pulmonary interstitium throughout including coarse appearing lung  
markings, perhaps slightly increased since prior. Basilar streaky densities. No  
definite pneumothorax identified. Medications given in the ED- Medications  
0.9% sodium chloride infusion (100 mL/hr IntraVENous New Bag 12/11/19 3644) potassium chloride 10 mEq in 100 ml IVPB (has no administration in time range) 0.9% sodium chloride infusion (has no administration in time range) LORazepam (ATIVAN) injection 1 mg (1 mg IntraVENous Given 12/10/19 5812) LORazepam (ATIVAN) injection 1 mg (1 mg IntraVENous Given 12/11/19 0034) diphenhydrAMINE (BENADRYL) injection 25 mg (25 mg IntraVENous Given 12/11/19 0110) Medical Decision Making I am the first provider for this patient. I reviewed the vital signs, available nursing notes, past medical history, past surgical history, family history and social history. Vital Signs-Reviewed the patient's vital signs. Records Reviewed: Nursing Notes EKG 16:34 PM 
NSR, rate 71, no acute ST changes Radiology X-ray chest shows chronic appearing interstitial changes, no acute process X-ray of the lumbar spine shows mild degenerative changes but no acute process Pending review by radiologist 
 
CT HEAD WO CONT Final Result IMPRESSION[de-identified]  
1.  No acute intracranial hemorrhage or territorial infarct. No mass effect.   
-Unremarkable for age Thank you for this referral.  
  
MRI LUMB SPINE WO CONT Final Result Addendum 1 of 1 Addendum: EXAMINATION:  
  
ANDRE Pittman called to discuss case at roughly 2245 hours. Additional subtle   
finding  
of left paraspinous muscle edema at T10 level, noted at field-of-view   
edge,  
incompletely evaluated. Contrast-enhanced thoracic spine MRI may be useful   
if  
clinically warranted. Final  
  
XR SPINE LUMB 2 OR 3 V Final Result IMPRESSION:  
  
No clearly acute findings. See details above. See follow-up MRI lumbar spine  
report. XR CHEST SNGL V Final Result IMPRESSION:  
  
Prominent pulmonary interstitium likely at least partly due to chronic  
interstitial lung disease. Component of interstitial infiltrate/edema difficult  
to completely exclude. No confluent consolidation. MRI [facility] SPINE W WO CONT    (Results Pending) MRI [facility] SPINE W WO CONT    (Results Pending) Procedures: 
Procedures ED Course: 
5:05 PM  
Initial assessment performed.  The patients presenting problems have been discussed, and they are in agreement with the care plan formulated and outlined with them. I have encouraged them to ask questions as they arise throughout their visit. 
 
5:09 PM consult note History physical discussed with ED attending Dr. Robi Crockett who agrees with laboratory work-up and also to do MRI lumbar spine without contrast.  Patient has no respiratory complaints at this time. May consider LP if MRI does not reveal cause of BLE weakness. 5:30 PM progress note Patient's daughter arrived to ED. She states that patient was complaining of severe lower back pain after a fall 5 days ago. Patient states she fell 5 days ago because her legs gave out. She tried to get patient to come to ED the last 2 days due to worsening lower extremity weakness but patient declined. 6:50 PM 
Care will be transferred from [facility] to [facility]. All results thus far discussed; with plan to await MRI lumbar spine and final admission/disposition  
 
7 PM Procedure note Under ultrasound guidance, a 20-gauge IV was placed in the right AC. 10 cc saline flush without difficulty or resistance. Skin was cleansed with ChloraPrep. Patient tolerated well 4:12 AM 
MRI lumbar spine showed no real acute explanation for patient's presenting symptoms. When I met the patient, she was incontinent of urine all over her bed and she said it been that way for the past hour. She could not get up to move away from her urine. Her bladder scan at that point showed over 600 cc of urine still retained. Spoke with the radiologist to read out the report. Explained my concern for the urinary retention her lower back pain and her inability to move her legs. She has bilateral lower extremity sensation intact but her leg strength motor neuron is only 1 out of 5. She has reflexes intact. Recommendation was for MRIs of thoracic and cervical spine.   Considering her prior history is a user though perhaps IVDU, was for including contrast.  She does not have a fever or white count but still looking for an expiration so those scans were ordered. Several attempts were made for the patient to have an MRI. Ativan was given twice and Benadryl was given a third time. The fourth attempt for the MRI was also unsuccessful with the patient agreeing to soft restraints. She cannot stop thrashing her torso and grabbing at things when she is in the scanner. I spoke with Dr. Collette Cohens after these failed attempts. He said she may have either a spinal cord tumor causing compression or her symptoms could be related to her electrolyte abnormalities or perhaps even an an additional endocrine problem. Her head CT was normal; this was ordered when her initial MRI was negative. At present, the nurse anesthetist is here to sedate the patient so that she can get an MRI. 4:17 AM : Pt care transferred to Dr. Gale Wiggins provider. History of patient complaint(s), available diagnostic reports and current treatment plan has been discussed thoroughly. Bedside rounding on patient occured : yes . Intended disposition of patient : admit Pending diagnostics reports and/or labs (please list): MRI, admission Diagnosis and Disposition CLINICAL IMPRESSION: 
 
1. Weakness of both lower extremities 2. Hyponatremia 3. Hypochloremia 4. Hypokalemia Please note that this dictation was completed with [employer], the computer voice recognition software. Quite often unanticipated grammatical, syntax, homophones, and other interpretive errors are inadvertently transcribed by the computer software. Please disregard these errors. Please excuse any errors that have escaped final proofreading.

## 2019-12-10 NOTE — TELEPHONE ENCOUNTER
Isabella Connell RN FOR [facility] CPKRPT([phone]). SHE WANTS DR Pricila Mendoza TO KNOW THAT PATIENT WILL BE GOING TO ER FOR SOB AND HAS FALLEN AT  [address] 12/8/19. SHE WANTS TO KNOW IF DR ALBERT CAN ADVOCATE ADMISSION FROM THE ER. SHE STATES THAT PATIENT NEEDS TO BE ADMITTED INSTEAD OF BEING DISCHARGED FROM THE ER ALL THE TIME.

## 2019-12-10 NOTE — TELEPHONE ENCOUNTER
Nurse Fredy Lubin calling to report the patient has fallen x 4 since 12/1. Her daughter is taking her to ER now at [facility]. Nurse advised pt does have appt here 12/24. They may need to call her PCP to inform about the falls and leg weakness.  Nurse agrees and will contact to make the PCP aware

## 2019-12-10 NOTE — PROGRESS NOTES
MRI Safety Screening form needs to be filled out and FAXED to [phone] MRI can be scheduled. If unable to acquire information from patient  [facility] must be contacted, or screening xrays will need to be ordered.    
IF pt is Claustrophobic or will need Pain Meds please have these ordered in advance to help facilitate MRI exam.

## 2019-12-11 PROBLEM — S22.000A COMPRESSION FRACTURE OF BODY OF THORACIC VERTEBRA (HCC): Status: ACTIVE | Noted: 2019-01-01

## 2019-12-11 PROBLEM — G95.20 SPINAL CORD COMPRESSION (HCC): Status: ACTIVE | Noted: 2019-01-01

## 2019-12-11 PROBLEM — E87.1 HYPONATREMIA: Status: ACTIVE | Noted: 2019-01-01

## 2019-12-11 NOTE — CONSULTS
[facility] Pulmonary, Critical Care, and Sleep Medicine Name: John Bach MRN: [id number] : 1955 Hospital: [facility] Date: 2019 [facility] Initial Patient Consult Consult requesting physician: Bennie Headley NP Reason for Consult: Hx of lung disease I have reviewed the flowsheet and notes. Events, vitals, medications and notes from last 24 hours reviewed. Care plan discussed with staff HPI: 
2019  
63 YO F with PMHx of Sarcoidosis, COPD and chronic respiratory failure (on 3L home O2) who presented to the ED with worsening lower back pain and LE weakness. History was obtained from the chart as the patient was seen just post op. Apparently she had been falling multiple times at home and was complaining of severe back pain. She follows in our clinic with Dr. Esa Lowe for COPD and sarcoidosis. She was last seen  where she was complaining of shortness of breath and cough. She was given doxycycline and a prednisone taper which she is still currently on at 40mg/day. She is still actively smoking. Last time she was able to complete PFTs was  and had an FEV1 50%. In the ED, vitals were stable and there were no acute respiratory issues. She was found to have thoracic cord compression with surrounding edema. There was a plan for emergent Laminectomy and decompression. Anesthesia requested a pulmonary evaluation given her pulmonary history. She denies any fevers or chills. No cough, no SOB. Further ROS unattainable Patient Active Problem List  
Diagnosis Code  Type 2 diabetes with stage 3 chronic kidney disease GFR 30-59 ([facility]) E11.22, N18.3  Diabetic neuropathy associated with type 2 diabetes mellitus ([facility]) E11.40  Venous insufficiency I87.2  Obesity, Class I, BMI 30-34.9 E66.9  Chronic back pain M54.9, G89.29  
 Generalized osteoarthritis of multiple sites M15.9  Bipolar affective disorder ([facility] F31.9  Abnormally low high density lipoprotein (HDL) cholesterol with hypertriglyceridemia E78.6, E78.1  Diastolic dysfunction without heart failure I51.89  Chronic obstructive pulmonary disease (COPD) ([facility] J44.9  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 congestive heart failure (HCC) I13.10, N18.3  CKD stage G3a/A1, GFR 45-59 and albumin creatinine ratio <30 mg/g ([facility]) N18.3  Depression F32.9  History of back injury Z87.828  
 Anxiety F41.9  Wears glasses Z97.3  History of hepatitis C Z86.19  
 Sickle cell trait ([facility]) D57.3  History of acute renal failure Z87.448  Hypothyroidism E03.9  Recurrent genital herpes A60.00  Sarcoidosis D86.9  Abscess of right arm L02.413  Hypoxemia requiring supplemental oxygen R09.02, Z99.81  
 Abnormal nuclear stress test R94.39  
 Cellulitis and abscess of hand L03.119, L02.519  
 Cellulitis and abscess of foot L03.119, L02.619  
 SIRS (systemic inflammatory response syndrome) ([facility]) R65.10  Cellulitis of arm, right L03. 113  
 Olecranon bursitis of right elbow M70.21  
 Contusion of left elbow S50. 02XA  Intravenous drug user F19.90  Right Achilles tendinitis M76.61  
 History of penicillin allergy Z88.0  Hypokalemia E87.6  Falls frequently R29.6  Gastroesophageal reflux disease with hiatal hernia K21.9, K44.9  Memory difficulty R41.3  Mixed connective tissue disease ([facility] M35.1  Impaired mobility and ADLs Z74.09  
 Acute renal failure superimposed on stage 3 chronic kidney disease (HCC) N17.9, N18.3  Hypomagnesemia E83.42  
 Hyperuricemia E79.0  History of vitamin D deficiency Z86.39  
 Urge urinary incontinence N39.41  
 Hyponatremia E87.1  Spinal cord compression ([facility]) G95.20  Compression fracture of body of thoracic vertebra ([facility]) S22.000A Assessment: 
Acute Spinal Cord Compression - s/p emergent laminectomy 12/11 Chronic Respiratory Failure - 2/2 sarcoidosis and COPD, active smoker 
- on anoro at home, follows with Dr. Joy Gerardo Hx of COPD 
- not in exacerbation Hx of sarcoidosis Hyponatremia Hypokalemia Impression/Plan: - Respiratory status appears at baseline after surgery - Continue prednisone 40mg/day, she should have received stress dose steroids for the surgerical procedure - Will place on brovana and pulmicort while inpatient - Pulmonary toilet, IS 
- PT/OT when cleared by ortho Code status: Full Code Medication Reviewed Allergies Allergen Reactions  Moxifloxacin Rash  Pcn [Penicillins] Swelling  Sulfa (Sulfonamide Antibiotics) Swelling Past Medical History:  
Diagnosis Date  Abnormally low high density lipoprotein (HDL) cholesterol with hypertriglyceridemia Lipid profile (11/6/2016) showed , , HDL 38, LDL 37  
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 congestive heart failure ([address] Better controlled  Bipolar affective disorder ([address] 12/5/2012  Cellulitis of right forearm 5/4/2017  Chronic back pain  Chronic lung disease  Chronic obstructive pulmonary disease (COPD) ([address] SOB, on paula O2 Recent admission with psychosis  CKD stage G3a/A1, GFR 45-59 and albumin creatinine ratio <30 mg/g ([facility]) 5/11/2017 Urine microalbumin/Creatinine ratio (5/11/2017) = 9 mg/g  Contusion of left elbow 5/4/2017  Depression  Diabetes mellitus ([address]  Diabetic neuropathy associated with type 2 diabetes mellitus ([address]  Diastolic dysfunction without heart failure Stable on diuretics  Falls frequently  Gastroesophageal reflux disease with hiatal hernia  Generalized osteoarthritis of multiple sites  Gout  History of acute renal failure 5/31/2013  History of back injury   
 jumped out of second story window  History of hepatitis C   
 treated  History of penicillin allergy  History of vitamin D deficiency 5/10/2017  Hyperuricemia 2017  Hypothyroidism  Hypoxemia requiring supplemental oxygen 2014  Intravenous drug user 2017  Memory difficulty  Menopause  Mixed connective tissue disease ([facility] 5/10/2017  Obesity, Class I, BMI 30-34.9  Olecranon bursitis of right elbow 2017  Recurrent genital herpes 2013  Right Achilles tendinitis 2017  Sarcoidosis  Sickle cell trait ([facility]  Type 2 diabetes with stage 3 chronic kidney disease GFR 30-59 ([facility])  Urge urinary incontinence 5/10/2017  Venous insufficiency  Wears glasses Past Surgical History:  
Procedure Laterality Date Maria Luisarebury  HX  SECTION    
 HX CYST REMOVAL Left  Left foot  HX OTHER SURGICAL Left 2017 S/P incision and drainage of the abscess of the left hand and the left foot (2017 - Dr. Yordan Benavides. [facility])  HX TUBAL LIGATION Social History Tobacco Use  Smoking status: Current Every Day Smoker Packs/day: 1.00 Years: 30.00 Pack years: 30.00 Types: Cigarettes Start date: 1969  Smokeless tobacco: Never Used Substance Use Topics  Alcohol use: No  
  
Family History Problem Relation Age of Onset  Seizures Other  Diabetes Mother  Hypertension Mother  Heart Disease Mother  Cancer Mother  Diabetes Father  Stroke Father  Heart Disease Father  Headache Sister  Depression Neg Hx  Suicide Neg Hx  Psychotic Disorder Neg Hx  Substance Abuse Neg Hx  Dementia Neg Hx Prior to Admission medications Medication Sig Start Date End Date Taking? Authorizing Provider  
predniSONE (DELTASONE) 20 mg tablet Take 60 mg by mouth daily (with breakfast).  60 MG DAILY X 3 DAYS, THEN 40 MG DAILY X 3 DAYS, THEN 20 MG DAILY X 3 DAYS 12/7/19   Darek Klein MD  
metaxalone [facility]) 800 mg tablet Take 1 Tab by mouth four (4) times daily for 5 days. Indications: muscle spasm 12/7/19 12/12/19  Darek Klein MD  
polyethylene glycol [facility]) 17 gram/dose powder Take 17 g by mouth daily. 1 tablespoon with 8 oz of water daily 11/29/19   Radha Theodore PA-C  
docusate sodium (COLACE) 100 mg capsule Take 1 Cap by mouth two (2) times a day. 11/29/19   Radha Theodore PA-C  
famotidine (PEPCID) 20 mg tablet TAKE 1 TABLET BY MOUTH ONCE DAILY 11/26/19   Suma Parsons NP  
umeclidinium-vilanterol (ANORO ELLIPTA) 62.5-25 mcg/actuation inhaler Take 1 Puff by inhalation daily. 11/20/19   Celsa Plaza MD  
aspirin 81 mg chewable tablet CHEW 1 TABLET DAILY WITH BREAKFAST 11/18/19   Suma Parsons NP  
BD INSULIN SYRINGE ULTRA-FINE 1 mL 31 gauge x 5/16 syrg  11/5/19   Provider, Historical  
promethazine (PHENERGAN) 25 mg tablet Take 1 Tab by mouth every six (6) hours as needed for Nausea (and withdrawl symptoms). 10/17/19   Eddie Albarran MD  
cloNIDine HCl (CATAPRES) 0.1 mg tablet Take 1 Tab by mouth three (3) times daily as needed (withdrawl symptoms) for up to 20 doses. 10/17/19   Eddie Albarran MD  
acetaminophen (TYLENOL) 325 mg tablet Take  by mouth every four (4) hours as needed for Pain. Provider, Historical  
famotidine (PEPCID) 20 mg tablet Take 1 Tab by mouth daily. Indications: Stress Ulcer Prevention 10/9/19   Emelyn Chang MD  
metOLazone (ZAROXOLYN) 2.5 mg tablet Take 1 Tab by mouth every fourty-eight (48) hours.  2/21/19   Emelyn Chang MD  
rosuvastatin (CRESTOR) 5 mg tablet TAKE ONE TABLET NIGHTLY 2/21/19   Emelyn Chang MD  
albuterol (PROAIR HFA) 90 mcg/actuation inhaler INHALE 2 PUFFS EVERY 4 HOURS AS NEEDED FOR WHEEZING 1/31/19   Celsa Plzaa MD  
albuterol (PROVENTIL VENTOLIN) 2.5 mg /3 mL (0.083 %) nebulizer solution USE 1 NEB EVERY 4 HOURS FOR WHEEZING AND SHORTNESS OF BREATH 1/31/19   Deng Alvarado MD  
oxybutynin (DITROPAN) 5 mg tablet Take 5 mg by mouth two (2) times a day. Provider, Historical  
allopurinol (ZYLOPRIM) 100 mg tablet Take 100 mg by mouth daily. Provider, Historical  
insulin glargine (LANTUS) 100 unit/mL injection Inject 70 units subcutaneously before breakfast (7AM) and 40 units subcutaneously after dinner (7PM). Indications: type 2 diabetes mellitus 5/31/17   Lucian Morillo MD  
insulin lispro (HUMALOG) 100 unit/mL injection To be given 15 min before meals: < 150 - None, 151-200 - 2 u, 201-250 - 4 u, 251-300 - 6 u, 301-350 - 8 u, 351-400 - 10 u, >400 - 12 u 5/31/17   Lucian Morillo MD  
levothyroxine (SYNTHROID) 100 mcg tablet Take 1 Tab by mouth Daily (before breakfast). Indications: hypothyroidism Patient taking differently: Take 75 mcg by mouth Daily (before breakfast). Indications: a condition with low thyroid hormone levels 5/31/17   Lucian Morillo MD  
cholecalciferol (VITAMIN D3) 1,000 unit tablet Take 2 Tabs by mouth daily. Indications: PREVENTION OF VITAMIN D DEFICIENCY 5/31/17   Lucian Morillo MD  
divalproex DR (DEPAKOTE) 250 mg tablet Take 250 mg by mouth nightly. Indications: BIPOLAR DISORDER IN REMISSION    Provider, Historical  
insulin syringe,safetyneedle 1 mL 30 gauge x 5/16\" syrg As directed 11/8/16   Jolly Sanchez MD  
ARIPiprazole (ABILIFY) 5 mg tablet Take 10 mg by mouth daily. Indications: BIPOLAR DISORDER IN REMISSION    Provider, Historical  
gabapentin (NEURONTIN) 300 mg capsule Take 300 mg by mouth daily as needed. Indications: Neuropathic Pain    Provider, Historical  
traZODone (DESYREL) 100 mg tablet Take 100 mg by mouth as needed. Indications: major depressive disorder    Ama, MD Karsten  
Nebulizer Accessories Kit Use with nebulizer machine 10/31/12   Deng Alvarado MD  
sertraline (ZOLOFT) 100 mg tablet Take 50 mg by mouth daily.  Indications: Bipolar Depression    Provider, Historical  
Oxygen-Air Delivery Systems by Nasal route continuous. 2 LPM via NC  Indications: Hypoxemia requiring supplementary oxygen    Provider, Historical  
 
Current Facility-Administered Medications Medication Dose Route Frequency  insulin lispro (HUMALOG) injection   SubCUTAneous TIDAC  
 [START ON 12/12/2019] allopurinol (ZYLOPRIM) tablet 100 mg  100 mg Oral DAILY  [START ON 12/12/2019] levothyroxine (SYNTHROID) tablet 75 mcg  75 mcg Oral 6am  
 rosuvastatin (CRESTOR) tablet 5 mg  5 mg Oral QHS  arformoterol (BROVANA) neb solution 15 mcg  15 mcg Nebulization BID RT  
 budesonide (PULMICORT) 1,000 mcg/2 mL nebulizer susp  1,000 mcg Nebulization BID RT  
 oxybutynin (DITROPAN) tablet 5 mg  5 mg Oral BID  [START ON 12/12/2019] ARIPiprazole (ABILIFY) tablet 10 mg  10 mg Oral DAILY  divalproex DR (DEPAKOTE) tablet 250 mg  250 mg Oral QHS  [START ON 12/12/2019] polyethylene glycol (MIRALAX) packet 17 g  17 g Oral DAILY  [START ON 12/12/2019] sertraline (ZOLOFT) tablet 50 mg  50 mg Oral DAILY  0.9% sodium chloride infusion  100 mL/hr IntraVENous CONTINUOUS  
 sodium chloride (NS) flush 5-40 mL  5-40 mL IntraVENous Q8H  
 [START ON 12/12/2019] bisacodyl (DULCOLAX) tablet 10 mg  10 mg Oral DAILY  acetaminophen (TYLENOL) tablet 1,000 mg  1,000 mg Oral Q6H  
 morphine (PF)  mg/30 ml   IntraVENous TITRATE  [START ON 12/12/2019] vancomycin (VANCOCIN) 1,000 mg in 0.9% sodium chloride (MBP/ADV) 250 mL adv  1 g IntraVENous Q12H  potassium chloride 10 mEq in 100 ml IVPB  10 mEq IntraVENous Q1H  
 sodium chloride tablet 1 g  1 g Oral TID WITH MEALS  
 [START ON 12/12/2019] insulin glargine (LANTUS) injection 10 Units  10 Units SubCUTAneous DAILY  [START ON 12/12/2019] nicotine (NICODERM CQ) 14 mg/24 hr patch 1 Patch  1 Patch TransDERmal DAILY  famotidine (PF) (PEPCID) 20 mg in 0.9% sodium chloride 10 mL injection 20 mg IntraVENous Q12H  hydrocortisone sod succ (PF) (SOLU-CORTEF) 250 mg in 0.9% sodium chloride 50 mL IVPB  250 mg IntraVENous Q8H Lines: All central lines examined by me. No signs of erythema, induration, discharge. Feeding Tube:                    
  
Peripherally Inserted Central Catheter: 
  
Central Venous Catheter: 
  
Peripheral Intravenous Line: 
Peripheral IV 19 Anterior;Right External jugular (Active) Site Assessment Bleeding 2019  1:30 PM  
Phlebitis Assessment 0 2019  1:30 PM  
Infiltration Assessment 0 2019  1:30 PM  
Dressing Status Loose; Wet 2019  1:30 PM  
Dressing Type Disk with Chlorhexadine gluconate (CHG) 2019  1:30 PM  
Action Taken Dressing changed 2019  1:30 PM  
   
Peripheral IV 19 Left Forearm (Active) Site Assessment Clean, dry, & intact 2019  9:46 AM  
Phlebitis Assessment 0 2019  9:46 AM  
Infiltration Assessment 0 2019  9:46 AM  
Dressing Status Clean, dry, & intact 2019  9:46 AM  
Dressing Type Tape;Transparent 2019  9:46 AM  
Hub Color/Line Status Pink; Infusing 2019  9:46 AM  
 
Arterial Line: 
  
Drain(s): 
Omer-Mcgee Drain 19 Posterior Back (Active) Site Assessment Clean, dry, & intact 2019  1:30 PM  
Dressing Status Clean, dry, & intact 2019  1:30 PM  
Status Draining;Patent; Charged 2019  1:30 PM  
Drainage Color Serosanguinous 2019  1:30 PM  
Output (ml) 90 ml 2019  2:40 PM  
 
Airway: 
  
 
Objective: 
Vital Signs:   
Visit Vitals /63 Pulse 90 Temp 98.4 °F (36.9 °C) Resp 17 LMP 2012 (Exact Date) SpO2 95% O2 Device: Nasal cannula O2 Flow Rate (L/min): 3 l/min Temp (24hrs), Av.5 °F (36.9 °C), Min:98.1 °F (36.7 °C), Max:99.1 °F (37.3 °C) Intake/Output:  
Last shift:      12/701 -  190 In: 1400 [I.V.:1400] Out: 990 [Urine:300; Drains:90] Intake/Output Summary (Last 24 hours) at 12/11/2019 1657 Last data filed at 12/11/2019 1530 Gross per 24 hour Intake 2050 ml Output 1790 ml Net 260 ml Ventilator Settings: 
Mode Rate Tidal Volume Pressure FiO2 PEEP Peak airway pressure:    
Plateau pressure:   
Tidal volume:  
Minute ventilation:   
SPO2  
 
ARDS network Guidelines: Lung protective strategy and Plateau pressure goals less than or equal to 30 Physical Exam:  
General/Neurology: Alert, Awake, no distress, on NC Head:   Normocephalic, without obvious abnormality Eye:   PERRL, EOM intact, no scleral icterus, no pallor Oral:   Mucus membranes moist 
Neck:   Supple Lung:   B/l air entry is fair, no wheezing, few scattered rhonchi Heart:   Regular rate & rhythm. S1 S2 present. Abdomen: Soft, non tender, BS+nt Extremities:  No pedal edema Skin:   Dry, intact Data: 
   
Recent Results (from the past 24 hour(s)) CBC WITH AUTOMATED DIFF Collection Time: 12/10/19  5:17 PM  
Result Value Ref Range WBC 10.4 4.6 - 13.2 K/uL  
 RBC 4.91 4.20 - 5.30 M/uL  
 HGB 13.7 12.0 - 16.0 g/dL HCT 39.6 35.0 - 45.0 % MCV 80.7 74.0 - 97.0 FL  
 MCH 27.9 24.0 - 34.0 PG  
 MCHC 34.6 31.0 - 37.0 g/dL  
 RDW 14.5 11.6 - 14.5 % PLATELET 409 643 - 861 K/uL MPV 9.1 (L) 9.2 - 11.8 FL  
 NEUTROPHILS 82 (H) 40 - 73 % LYMPHOCYTES 14 (L) 21 - 52 % MONOCYTES 4 3 - 10 % EOSINOPHILS 0 0 - 5 % BASOPHILS 0 0 - 2 %  
 ABS. NEUTROPHILS 8.6 (H) 1.8 - 8.0 K/UL  
 ABS. LYMPHOCYTES 1.4 0.9 - 3.6 K/UL  
 ABS. MONOCYTES 0.4 0.05 - 1.2 K/UL  
 ABS. EOSINOPHILS 0.0 0.0 - 0.4 K/UL  
 ABS. BASOPHILS 0.0 0.0 - 0.1 K/UL  
 DF AUTOMATED METABOLIC PANEL, COMPREHENSIVE Collection Time: 12/10/19  5:17 PM  
Result Value Ref Range Sodium 120 (L) 136 - 145 mmol/L Potassium 3.7 3.5 - 5.5 mmol/L Chloride 79 (L) 100 - 111 mmol/L  
 CO2 36 (H) 21 - 32 mmol/L Anion gap 5 3.0 - 18 mmol/L Glucose 161 (H) 74 - 99 mg/dL BUN 21 (H) 7.0 - 18 MG/DL Creatinine 1.04 0.6 - 1.3 MG/DL  
 BUN/Creatinine ratio 20 12 - 20 GFR est AA >60 >60 ml/min/1.73m2 GFR est non-AA 54 (L) >60 ml/min/1.73m2 Calcium 9.1 8.5 - 10.1 MG/DL Bilirubin, total 0.7 0.2 - 1.0 MG/DL  
 ALT (SGPT) 32 13 - 56 U/L  
 AST (SGOT) 27 10 - 38 U/L Alk. phosphatase 108 45 - 117 U/L Protein, total 8.1 6.4 - 8.2 g/dL Albumin 3.1 (L) 3.4 - 5.0 g/dL Globulin 5.0 (H) 2.0 - 4.0 g/dL A-G Ratio 0.6 (L) 0.8 - 1.7 CK Collection Time: 12/10/19  5:17 PM  
Result Value Ref Range  26 - 192 U/L  
MAGNESIUM Collection Time: 12/10/19  5:17 PM  
Result Value Ref Range Magnesium 1.7 1.6 - 2.6 mg/dL URINALYSIS W/ RFLX MICROSCOPIC Collection Time: 12/10/19 10:50 PM  
Result Value Ref Range Color YELLOW Appearance CLEAR Specific gravity 1.008 1.005 - 1.030    
 pH (UA) 6.5 5.0 - 8.0 Protein TRACE (A) NEG mg/dL Glucose NEGATIVE  NEG mg/dL Ketone NEGATIVE  NEG mg/dL Bilirubin NEGATIVE  NEG Blood NEGATIVE  NEG Urobilinogen 1.0 0.2 - 1.0 EU/dL Nitrites NEGATIVE  NEG Leukocyte Esterase NEGATIVE  NEG    
URINE MICROSCOPIC ONLY Collection Time: 12/10/19 10:50 PM  
Result Value Ref Range WBC 0 to 2 0 - 4 /hpf  
 RBC 0 to 2 0 - 5 /hpf Epithelial cells FEW 0 - 5 /lpf METABOLIC PANEL, BASIC Collection Time: 12/11/19  3:40 AM  
Result Value Ref Range Sodium 128 (L) 136 - 145 mmol/L Potassium 2.8 (LL) 3.5 - 5.5 mmol/L Chloride 86 (L) 100 - 111 mmol/L  
 CO2 37 (H) 21 - 32 mmol/L Anion gap 5 3.0 - 18 mmol/L Glucose 109 (H) 74 - 99 mg/dL BUN 21 (H) 7.0 - 18 MG/DL Creatinine 0.82 0.6 - 1.3 MG/DL  
 BUN/Creatinine ratio 26 (H) 12 - 20 GFR est AA >60 >60 ml/min/1.73m2 GFR est non-AA >60 >60 ml/min/1.73m2 Calcium 9.1 8.5 - 10.1 MG/DL  
CBC WITH AUTOMATED DIFF Collection Time: 12/11/19  7:05 AM  
Result Value Ref Range WBC 10.7 4.6 - 13.2 K/uL RBC 4.42 4.20 - 5.30 M/uL  
 HGB 12.3 12.0 - 16.0 g/dL HCT 35.9 35.0 - 45.0 % MCV 81.2 74.0 - 97.0 FL  
 MCH 27.8 24.0 - 34.0 PG  
 MCHC 34.3 31.0 - 37.0 g/dL  
 RDW 14.5 11.6 - 14.5 % PLATELET 839 (L) 301 - 420 K/uL MPV 8.4 (L) 9.2 - 11.8 FL  
 NEUTROPHILS 60 40 - 73 % LYMPHOCYTES 33 21 - 52 % MONOCYTES 7 3 - 10 % EOSINOPHILS 0 0 - 5 % BASOPHILS 0 0 - 2 %  
 ABS. NEUTROPHILS 6.3 1.8 - 8.0 K/UL  
 ABS. LYMPHOCYTES 3.6 0.9 - 3.6 K/UL  
 ABS. MONOCYTES 0.7 0.05 - 1.2 K/UL  
 ABS. EOSINOPHILS 0.0 0.0 - 0.4 K/UL  
 ABS. BASOPHILS 0.0 0.0 - 0.1 K/UL  
 DF AUTOMATED METABOLIC PANEL, COMPREHENSIVE Collection Time: 12/11/19  7:05 AM  
Result Value Ref Range Sodium 128 (L) 136 - 145 mmol/L Potassium 2.7 (LL) 3.5 - 5.5 mmol/L Chloride 88 (L) 100 - 111 mmol/L  
 CO2 35 (H) 21 - 32 mmol/L Anion gap 5 3.0 - 18 mmol/L Glucose 96 74 - 99 mg/dL BUN 18 7.0 - 18 MG/DL Creatinine 0.69 0.6 - 1.3 MG/DL  
 BUN/Creatinine ratio 26 (H) 12 - 20 GFR est AA >60 >60 ml/min/1.73m2 GFR est non-AA >60 >60 ml/min/1.73m2 Calcium 8.4 (L) 8.5 - 10.1 MG/DL Bilirubin, total 0.8 0.2 - 1.0 MG/DL  
 ALT (SGPT) 23 13 - 56 U/L  
 AST (SGOT) 19 10 - 38 U/L Alk. phosphatase 93 45 - 117 U/L Protein, total 6.7 6.4 - 8.2 g/dL Albumin 2.6 (L) 3.4 - 5.0 g/dL Globulin 4.1 (H) 2.0 - 4.0 g/dL A-G Ratio 0.6 (L) 0.8 - 1.7 POC 6 PLUS Collection Time: 12/11/19  9:16 AM  
Result Value Ref Range Sodium,  (L) 136 - 145 MMOL/L Potassium, POC 3.3 (L) 3.5 - 5.5 MMOL/L Chloride, POC 84 (L) 100 - 108 MMOL/L  
 BUN, POC 21 (H) 7 - 18 MG/DL Glucose,  74 - 106 MG/DL Hematocrit, POC 41 36 - 49 % Hemoglobin, POC 13.9 12 - 16 G/DL  
TYPE & SCREEN Collection Time: 12/11/19 10:25 AM  
Result Value Ref Range Crossmatch Expiration 12/14/2019 ABO/Rh(D) A POSITIVE Antibody screen POS Comment NOTIFIED Rupal Duvall OP, POSSIBLE DELAY DUE TO AB BY LUCAS, [id number] AT 1132. PATIENT IS STILL IN SURGERY, CAN NOT CONFIRM TRANSFUSION HISTORY, [id number] AT 1241, LUCAS. Antibody ID [id number]   
CULTURE, SURGICAL WOUND W GRAM STAIN Collection Time: 12/11/19 12:35 PM  
Result Value Ref Range Special Requests: EPIDURAL SPACE 1   
 GRAM STAIN RARE WBC'S    
 GRAM STAIN NO ORGANISMS SEEN Culture result: PENDING   
CULTURE, SURGICAL WOUND W GRAM STAIN Collection Time: 12/11/19 12:36 PM  
Result Value Ref Range Special Requests: EPIDURAL SPACE 2   
 GRAM STAIN FEW WBC'S    
 GRAM STAIN NO ORGANISMS SEEN Culture result: PENDING   
GLUCOSE, POC Collection Time: 12/11/19  1:30 PM  
Result Value Ref Range Glucose (POC) 135 (H) 70 - 110 mg/dL Chemistry Recent Labs 12/11/19 
0705 12/11/19 
0340 12/10/19 
1717 GLU 96 109* 161* * 128* 120*  
K 2.7* 2.8* 3.7 CL 88* 86* 79* CO2 35* 37* 36* BUN 18 21* 21* CREA 0.69 0.82 1.04  
CA 8.4* 9.1 9.1 MG  --   --  1.7 AGAP 5 5 5 BUCR 26* 26* 20  
AP 93  --  108 TP 6.7  --  8.1 ALB 2.6*  --  3.1*  
GLOB 4.1*  --  5.0* AGRAT 0.6*  --  0.6* CBC w/Diff Recent Labs 12/11/19 
0705 12/10/19 
1717 WBC 10.7 10.4 RBC 4.42 4.91  
HGB 12.3 13.7 HCT 35.9 39.6 * 157 GRANS 60 82* LYMPH 33 14* EOS 0 0 ABG  No results for input(s): PHI, PHI, POC2, PCO2I, PO2, PO2I, HCO3, HCO3I, FIO2, FIO2I in the last 72 hours. Micro Recent Labs 12/11/19 
1236 12/11/19 
1235 CULT PENDING PENDING Recent Labs 12/11/19 
1236 12/11/19 
1235 CULT PENDING PENDING  
 
 
CT (Most Recent) Results from Hospital Encounter encounter on 12/10/19 CT HEAD WO CONT Narrative CT HEAD WO CONT History: Abnormal gait (ataxia); Visual loss, sudden onset ; leg weakness, 
unable to move legs Technique: 5 mm axial images acquired through the brain. CT scans at this facility are performed using dose optimization technique as 
appropriate with performed exam, to include automated exposure control, 
adjustment of mA and/or kV according to patient's size (including appropriate 
matching for site-specific examinations), or use of iterative reconstruction 
technique. Comparison: May 2013 Findings: Motion degraded imaging Soft tissues: No significant findings. Skull base/calvarium: Unremarkable. Brain: There is no acute intracranial hemorrhage or territorial infarction. Minor amount of scattered white matter lucencies, periventricular and 
subcortical.   
 
Ventricles and cisterns: Unremarkable for age. Orbits: Unremarkable. Paranasal Sinuses: Clear Other findings: None Impression IMPRESSION[de-identified] 
1.  No acute intracranial hemorrhage or territorial infarct. No mass effect.  
-Unremarkable for age Thank you for this referral.  
 
 
XR (Most Recent). CXR reviewed by me and compared with previous CXR Results from Hospital Encounter encounter on 12/10/19 XR CHEST PORT Narrative EXAM: XR CHEST PORT 
 
HISTORY: post line placement COMPARISON: December 10, 2019 FINDINGS: 
 
The tip of the right jugular catheter is at the level of the midportion of the 
superior vena cava. Cardiac size and pulmonary vascular markings appear improved 
consistent with resolving changes of congestive cardiac failure superimposed on 
chronic interstitial lung disease. There is mild blunting of the costophrenic 
angles consistent with small pleural effusions. The bony thorax appears intact. No evidence of pneumothorax. Impression IMPRESSION[de-identified] 
 
1 Central catheter in satisfactory position. 2. Pulmonary vessel markings are less prominent and the cardiac size is smaller 
than on the prior study consistent with resolving changes of congestive cardiac 
failure.  
 
 
  
 
 
 
High complexity decision making was performed during the evaluation of this patient at high risk for decompensation with multiple organ involvement Above mentioned total time spent on reviewing the case/medical record/data/notes/EMR/patient examination/documentation/coordinating care with nurse/consultants, exclusive of procedures with complex decision making performed and > 50% time spent in face to face evaluation. Bc Will MD 
12/11/2019

## 2019-12-11 NOTE — PROGRESS NOTES
Bedside and Verbal shift change report given to Georgi Leroy RN (oncoming nurse) by Karie Plummer RN (offgoing nurse). Report included the following information SBAR, Kardex, Intake/Output, MAR, Recent Results and Med Rec Status.

## 2019-12-11 NOTE — ANESTHESIA PROCEDURE NOTES
Central Line Placement Start time: 12/11/2019 9:50 AM 
End time: 12/11/2019 10:10 AM 
Performed by: Héctor Raza MD 
Authorized by: Héctor Raza MD  
 
Indications: vascular access Preanesthetic Checklist: patient identified, risks and benefits discussed, anesthesia consent, site marked, patient being monitored and timeout performed Timeout Time: 09:50 Pre-procedure: All elements of maximal sterile barrier technique followed? Yes   
2% Chlorhexidine for cutaneous antisepsis, Hand hygiene performed prior to catheter insertion and Ultrasound guidance Sterile Ultrasound Technique followed?: Yes Ultrasound Image Stored? Image stored Procedure:  
Prep:  Chlorhexidine Orientation:  Right Patient position:  Other (comment) (Pt unable to tolerate lying flat and had to be postioned head up ) Catheter size:  7 Fr Catheter length:  16 cm Number of attempts:  2 Successful placement: Yes Assessment:  
Post-procedure:  Catheter secured, sterile dressing applied and sterile dressing with CHG applied Assessment:  Blood return through all ports, free fluid flow, placement verified by x-ray and guidewire removal verified Insertion:  Uncomplicated Patient tolerance:  Patient tolerated the procedure well with no immediate complications

## 2019-12-11 NOTE — ANESTHESIA PREPROCEDURE EVALUATION
Relevant Problems No relevant active problems Anesthetic History No history of anesthetic complications Review of Systems / Medical History Patient summary reviewed, nursing notes reviewed and pertinent labs reviewed Pulmonary Neuro/Psych Cardiovascular Exercise tolerance: <4 METS 
  
GI/Hepatic/Renal 
  
 
 
 
 
 
 Endo/Other Other Findings Physical Exam 
 
Airway Mallampati: II 
TM Distance: 4 - 6 cm Neck ROM: normal range of motion Mouth opening: Normal 
 
 Cardiovascular Rhythm: regular Rate: normal 
 
 
 
 Dental 
 
Dentition: Edentulous Pulmonary Breath sounds clear to auscultation Abdominal 
GI exam deferred Other Findings Anesthetic Plan ASA: 4, emergent Anesthesia type: MAC Anesthetic plan and risks discussed with: Patient Unable to get family/ daughter   Number from patient however patient a little sleepy when not stimulated but A and O times 3 when made to focus

## 2019-12-11 NOTE — CONSULTS
Consult Patient: Pritesh Cason MRN: [id number]  SSN: [id number] YOB: 1955  Age: 62 y.o. Sex: female Subjective:  
  
Pritesh Cason is a 62 y.o. female who is being seen for paraparesis. Patient is a IDDM, COPD, Sarcoidosis patient on steroids. Presented several days ago to ER with pulmonary symtoms. Treated and released. Represented today with paraparesis, pain, no sensory level. Noted to be incontinent, urinary retention, , metabolic disarray. Initial MRI of LS spine nl, noted to have possible soft tissue swelling on right just above scan area. Repeat MRI cold not be initially done secondary to patient movement. Iwas called and told them patient needed study and that anesthesia enlisted so it could be done. Am in MRI suite watching study with anesthesia. Appears to show acute fx of T9, epidural phlegmon, t8/T9. Also pleural effusion. Na now 128 with K of 2.8. Past Medical History:  
Diagnosis Date  Abnormally low high density lipoprotein (HDL) cholesterol with hypertriglyceridemia Lipid profile (11/6/2016) showed , , HDL 38, LDL 37  
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 congestive heart failure ([address] Better controlled  Bipolar affective disorder ([address] 12/5/2012  Cellulitis of right forearm 5/4/2017  Chronic back pain  Chronic lung disease  Chronic obstructive pulmonary disease (COPD) ([address] SOB, on [place] O2 Recent admission with psychosis  CKD stage G3a/A1, GFR 45-59 and albumin creatinine ratio <30 mg/g ([facility]) 5/11/2017 Urine microalbumin/Creatinine ratio (5/11/2017) = 9 mg/g  Contusion of left elbow 5/4/2017  Depression  Diabetes mellitus ([address]  Diabetic neuropathy associated with type 2 diabetes mellitus ([address]  Diastolic dysfunction without heart failure Stable on diuretics  Falls frequently  Gastroesophageal reflux disease with hiatal hernia  Generalized osteoarthritis of multiple sites  Gout  History of acute renal failure 2013  History of back injury   
 jumped out of second story window  History of hepatitis C   
 treated  History of penicillin allergy  History of vitamin D deficiency 5/10/2017  Hyperuricemia 2017  Hypothyroidism  Hypoxemia requiring supplemental oxygen 2014  Intravenous drug user 2017  Memory difficulty  Menopause  Mixed connective tissue disease ([facility] 5/10/2017  Obesity, Class I, BMI 30-34.9  Olecranon bursitis of right elbow 2017  Recurrent genital herpes 2013  Right Achilles tendinitis 2017  Sarcoidosis  Sickle cell trait ([facility]  Type 2 diabetes with stage 3 chronic kidney disease GFR 30-59 ([facility])  Urge urinary incontinence 5/10/2017  Venous insufficiency  Wears glasses Past Surgical History:  
[facility]  HX  SECTION    
 HX CYST REMOVAL Left  Left foot  HX OTHER SURGICAL Left 2017 S/P incision and drainage of the abscess of the left hand and the left foot (2017 - Dr. Tonya Duane. [facility])  HX TUBAL LIGATION Family History Problem Relation Age of Onset  Seizures Other  Diabetes Mother  Hypertension Mother  Heart Disease Mother  Cancer Mother  Diabetes Father  Stroke Father  Heart Disease Father  Headache Sister  Depression Neg Hx  Suicide Neg Hx  Psychotic Disorder Neg Hx  Substance Abuse Neg Hx  Dementia Neg Hx Social History Tobacco Use  Smoking status: Current Every Day Smoker Packs/day: 1.00 Years: 30.00 Pack years: 30.00 Types: Cigarettes Start date: 1969  Smokeless tobacco: Never Used Substance Use Topics  Alcohol use: No  
  
Current Facility-Administered Medications Medication Dose Route Frequency Provider Last Rate Last Dose  potassium chloride 10 mEq in 100 ml IVPB  10 mEq IntraVENous Q1H Nhi Pittman PA      
 0.9% sodium chloride infusion  100 mL/hr IntraVENous CONTINUOUS Nhi Pitmtan PA      
 0.9% sodium chloride infusion  100 mL/hr IntraVENous CONTINUOUS Nhi Pittman  mL/hr at 12/11/19 0348 100 mL/hr at 12/11/19 0348 Current Outpatient Medications Medication Sig Dispense Refill  predniSONE (DELTASONE) 20 mg tablet Take 60 mg by mouth daily (with breakfast). 60 MG DAILY X 3 DAYS, THEN 40 MG DAILY X 3 DAYS, THEN 20 MG DAILY X 3 DAYS 18 Tab 0  
 metaxalone (SKELAXIN) 800 mg tablet Take 1 Tab by mouth four (4) times daily for 5 days. Indications: muscle spasm 20 Tab 0  
 polyethylene glycol (MIRALAX) 17 gram/dose powder Take 17 g by mouth daily. 1 tablespoon with 8 oz of water daily 289 g 0  
 docusate sodium (COLACE) 100 mg capsule Take 1 Cap by mouth two (2) times a day. 30 Cap 0  
 famotidine (PEPCID) 20 mg tablet TAKE 1 TABLET BY MOUTH ONCE DAILY 15 Tab 0  
 umeclidinium-vilanterol (ANORO ELLIPTA) 62.5-25 mcg/actuation inhaler Take 1 Puff by inhalation daily. 1 Inhaler 0  
 aspirin 81 mg chewable tablet CHEW 1 TABLET DAILY WITH BREAKFAST 30 Tab 0  BD INSULIN SYRINGE ULTRA-FINE 1 mL 31 gauge x 5/16 syrg  promethazine (PHENERGAN) 25 mg tablet Take 1 Tab by mouth every six (6) hours as needed for Nausea (and withdrawl symptoms). 15 Tab 0  cloNIDine HCl (CATAPRES) 0.1 mg tablet Take 1 Tab by mouth three (3) times daily as needed (withdrawl symptoms) for up to 20 doses. 20 Tab 0  
 acetaminophen (TYLENOL) 325 mg tablet Take  by mouth every four (4) hours as needed for Pain.  famotidine (PEPCID) 20 mg tablet Take 1 Tab by mouth daily. Indications: Stress Ulcer Prevention 30 Tab 1  
 metOLazone (ZAROXOLYN) 2.5 mg tablet Take 1 Tab by mouth every fourty-eight (48) hours.  90 Tab 1  
  rosuvastatin (CRESTOR) 5 mg tablet TAKE ONE TABLET NIGHTLY 90 Tab 3  
 albuterol (PROAIR HFA) 90 mcg/actuation inhaler INHALE 2 PUFFS EVERY 4 HOURS AS NEEDED FOR WHEEZING 1 Inhaler 5  
 albuterol (PROVENTIL VENTOLIN) 2.5 mg /3 mL (0.083 %) nebulizer solution USE 1 NEB EVERY 4 HOURS FOR WHEEZING AND SHORTNESS OF BREATH 2 Package 3  
 oxybutynin (DITROPAN) 5 mg tablet Take 5 mg by mouth two (2) times a day.  allopurinol (ZYLOPRIM) 100 mg tablet Take 100 mg by mouth daily.  insulin glargine (LANTUS) 100 unit/mL injection Inject 70 units subcutaneously before breakfast (7AM) and 40 units subcutaneously after dinner (7PM). Indications: type 2 diabetes mellitus 1 Vial 0  
 insulin lispro (HUMALOG) 100 unit/mL injection To be given 15 min before meals: < 150 - None, 151-200 - 2 u, 201-250 - 4 u, 251-300 - 6 u, 301-350 - 8 u, 351-400 - 10 u, >400 - 12 u 1 Vial 0  
 levothyroxine (SYNTHROID) 100 mcg tablet Take 1 Tab by mouth Daily (before breakfast). Indications: hypothyroidism (Patient taking differently: Take 75 mcg by mouth Daily (before breakfast). Indications: a condition with low thyroid hormone levels) 15 Tab 0  cholecalciferol (VITAMIN D3) 1,000 unit tablet Take 2 Tabs by mouth daily. Indications: PREVENTION OF VITAMIN D DEFICIENCY 30 Tab 0  
 divalproex DR (DEPAKOTE) 250 mg tablet Take 250 mg by mouth nightly. Indications: BIPOLAR DISORDER IN REMISSION  insulin syringe,safetyneedle 1 mL 30 gauge x 5/16\" syrg As directed 50 Each 0  
 ARIPiprazole (ABILIFY) 5 mg tablet Take 10 mg by mouth daily. Indications: BIPOLAR DISORDER IN REMISSION    
 gabapentin (NEURONTIN) 300 mg capsule Take 300 mg by mouth daily as needed. Indications: Neuropathic Pain  traZODone (DESYREL) 100 mg tablet Take 100 mg by mouth as needed. Indications: major depressive disorder  Nebulizer Accessories Kit Use with nebulizer machine 1 Kit prn  sertraline (ZOLOFT) 100 mg tablet Take 50 mg by mouth daily. Indications: Bipolar Depression  Oxygen-Air Delivery Systems by Nasal route continuous. 2 LPM via NC  Indications: Hypoxemia requiring supplementary oxygen Allergies Allergen Reactions  Moxifloxacin Rash  Pcn [Penicillins] Swelling  Sulfa (Sulfonamide Antibiotics) Swelling Review of Systems: 
Pertinent items are noted in the History of Present Illness. Objective:  
 
Vitals:  
 12/10/19 2130 12/10/19 2230 12/10/19 2300 12/11/19 0352 BP: 124/76 (!) 131/92 120/90 129/74 Pulse:      
Resp:      
Temp:      
SpO2: 93% 100% Physical Exam: 
Patiient in scanner so exam not available reported to have 1/5 strength in Bilateral LE and no sensory deficit. Assessment:  
 
Patient with onset of paraparesis who is immunocompromised on steroids and been treated with abx for pulmonary condition. Severe hyponatremia. My reading on machine is that patient has a pathologic fracture of T9 with cord compression and epidural phlegmon either infection or sarcoid. Await wet read of MRI and exam 
 
Plan:  
 
Continue patient resuscitation. To OR for emergent  Decompression and fusion T6- t11. Although anterior pathology patient in no condition for thoracotomy or extensive anterior procedure. Trying to preserve lower extremity function and make pathologic diagnosis. Signed By: Simon Cornelius MD   
 December 11, 2019

## 2019-12-11 NOTE — H&P
Hospitalist Admission History and Physical 
 
NAME:  Stephen Sinha :   1955 MRN:   [id number] PCP:  Rc Ford NP Date/Time:  2019 8:29 AM 
Subjective: CHIEF COMPLAINT: low back pain, paraparesis HISTORY OF PRESENT ILLNESS:    
Ms. Ariella Brush is a 61 yo  female with a past medical history of Type 2 DM, COPD, sarcoidosis on chronic steroids/on 3L NC, gout, hyperlipidemia and urinary incontinence who presented to [facility] ED for falls at home, low back pain and paraparesis. Patient states she has fallen at home 4 times since Saturday Dec 7, 2019. She \"lost her balance\". She lives at home with a friend who had to assist her up from the floor. She denied head trauma and LOC. Ever since the falls, patient states she can not move her legs/ambulate. At baseline she is able to ambulate independently without assistance. Patient endorses \"cold for the past 1 month\" with productive cough yellow sputum. She endorses low back pain and the inability to move LEs. She denies fever, chills, chest pain, palpitations, SOB, abd pain, nausea/vomiting, bowel incontinence. ED course: - Vital signs: 99.1 F, HR 69, /78, 99% 3L NC 
- Labs remarkable for: platelets 548, Na 035, K 2.7, albumin 2.6, blood cultures were collected,  
- Imaging: CT head wo contrast unremarkable;  chest xray prominent pulmonary interstitium throughout; xray lumbar spine +lumbar lordosis, lower lumbar facet arthropathy, no acute fracture. MRI lumbar spine degenerative changes at L3-S1 with mild disc herniations, L4-L5 mod spinal canal stenosis, L5-S1 mod foraminal stenosis with possible abutement of right L5 nerve, L5-S1 annular fissure. MRI cervical/thoracic spine obtained under sedation with help of anesthesia- acute compression fractures T9-T10 with mild cord compression and cord edema. - EKG NSR  
- Patient received:  25 mg IV benadryl, ativan 1 mg x2 doses. Potassium 10 mEq x5 doses has been ordered - ORTHO Surgery was consulted in the ED who recommended emergent decompression and fusion of T6-T11. . Dr. Diane Hernandez in ED spoke to teleneuro and \"agreed with assessment that this does not correlate with central demyelination\"  Bladder scan in ED showed 600 cc urine retention,  
 
- History obtained from: patient - Limitations in obtaining history: none - Admitted from:  Home - PCP:  
- Specialists: Dr. Sakshi Allison - Family Contact:  
 
 
Past Medical History:  
Diagnosis Date  Abnormally low high density lipoprotein (HDL) cholesterol with hypertriglyceridemia Lipid profile (11/6/2016) showed , , HDL 38, LDL 37  
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 congestive heart failure ([address] Better controlled  Bipolar affective disorder ([address] 12/5/2012  Cellulitis of right forearm 5/4/2017  Chronic back pain  Chronic lung disease  Chronic obstructive pulmonary disease (COPD) ([address] SOB, on paula O2 Recent admission with psychosis  CKD stage G3a/A1, GFR 45-59 and albumin creatinine ratio <30 mg/g ([facility]) 5/11/2017 Urine microalbumin/Creatinine ratio (5/11/2017) = 9 mg/g  Contusion of left elbow 5/4/2017  Depression  Diabetes mellitus ([address]  Diabetic neuropathy associated with type 2 diabetes mellitus ([address]  Diastolic dysfunction without heart failure Stable on diuretics  Falls frequently  Gastroesophageal reflux disease with hiatal hernia  Generalized osteoarthritis of multiple sites  Gout  History of acute renal failure 5/31/2013  History of back injury   
 jumped out of second story window  History of hepatitis C   
 treated  History of penicillin allergy  History of vitamin D deficiency 5/10/2017  Hyperuricemia 5/26/2017  Hypothyroidism  Hypoxemia requiring supplemental oxygen 12/29/2014  Intravenous drug user 5/2/2017  Memory difficulty  Menopause  Mixed connective tissue disease ([facility] 5/10/2017  Obesity, Class I, BMI 30-34.9  Olecranon bursitis of right elbow 2017  Recurrent genital herpes 2013  Right Achilles tendinitis 2017  Sarcoidosis  Sickle cell trait ([facility]  Type 2 diabetes with stage 3 chronic kidney disease GFR 30-59 ([facility])  Urge urinary incontinence 5/10/2017  Venous insufficiency  Wears glasses Past Surgical History:  
Procedure Laterality Date [place]  HX  SECTION    
 HX CYST REMOVAL Left 1979 Left foot  HX OTHER SURGICAL Left 2017 S/P incision and drainage of the abscess of the left hand and the left foot (2017 - Dr. Val Wiggins. Sherry Almaguer)  HX TUBAL LIGATION Social History Tobacco Use  Smoking status: Current Every Day Smoker Packs/day: 1.00 Years: 30.00 Pack years: 30.00 Types: Cigarettes Start date: 1969  Smokeless tobacco: Never Used Substance Use Topics  Alcohol use: No  
  
 
Family History Problem Relation Age of Onset  Seizures Other  Diabetes Mother  Hypertension Mother  Heart Disease Mother  Cancer Mother  Diabetes Father  Stroke Father  Heart Disease Father  Headache Sister  Depression Neg Hx  Suicide Neg Hx  Psychotic Disorder Neg Hx  Substance Abuse Neg Hx  Dementia Neg Hx Allergies Allergen Reactions  Moxifloxacin Rash  Pcn [Penicillins] Swelling  Sulfa (Sulfonamide Antibiotics) Swelling Prior to Admission Medications Prescriptions Last Dose Informant Patient Reported? Taking? ARIPiprazole (ABILIFY) 5 mg tablet   Yes No  
Sig: Take 10 mg by mouth daily. Indications: BIPOLAR DISORDER IN REMISSION  
BD INSULIN SYRINGE ULTRA-FINE 1 mL 31 gauge x 5/16 syrg   Yes No  
Nebulizer Accessories Kit   No No  
Sig: Use with nebulizer machine Oxygen-Air Delivery Systems   Yes No  
 Sig: by Nasal route continuous. 2 LPM via NC  Indications: Hypoxemia requiring supplementary oxygen  
acetaminophen (TYLENOL) 325 mg tablet   Yes No  
Sig: Take  by mouth every four (4) hours as needed for Pain. albuterol (PROAIR HFA) 90 mcg/actuation inhaler   No No  
Sig: INHALE 2 PUFFS EVERY 4 HOURS AS NEEDED FOR WHEEZING  
albuterol (PROVENTIL VENTOLIN) 2.5 mg /3 mL (0.083 %) nebulizer solution   No No  
Sig: USE 1 NEB EVERY 4 HOURS FOR WHEEZING AND SHORTNESS OF BREATH  
allopurinol (ZYLOPRIM) 100 mg tablet   Yes No  
Sig: Take 100 mg by mouth daily. aspirin 81 mg chewable tablet   No No  
Sig: CHEW 1 TABLET DAILY WITH BREAKFAST  
cholecalciferol (VITAMIN D3) 1,000 unit tablet   No No  
Sig: Take 2 Tabs by mouth daily. Indications: PREVENTION OF VITAMIN D DEFICIENCY  
cloNIDine HCl (CATAPRES) 0.1 mg tablet   No No  
Sig: Take 1 Tab by mouth three (3) times daily as needed (withdrawl symptoms) for up to 20 doses. divalproex DR (DEPAKOTE) 250 mg tablet   Yes No  
Sig: Take 250 mg by mouth nightly. Indications: BIPOLAR DISORDER IN REMISSION  
docusate sodium (COLACE) 100 mg capsule   No No  
Sig: Take 1 Cap by mouth two (2) times a day. famotidine (PEPCID) 20 mg tablet   No No  
Sig: Take 1 Tab by mouth daily. Indications: Stress Ulcer Prevention  
famotidine (PEPCID) 20 mg tablet   No No  
Sig: TAKE 1 TABLET BY MOUTH ONCE DAILY  
gabapentin (NEURONTIN) 300 mg capsule   Yes No  
Sig: Take 300 mg by mouth daily as needed. Indications: Neuropathic Pain  
insulin glargine (LANTUS) 100 unit/mL injection   No No  
Sig: Inject 70 units subcutaneously before breakfast (7AM) and 40 units subcutaneously after dinner (7PM).   Indications: type 2 diabetes mellitus  
insulin lispro (HUMALOG) 100 unit/mL injection   No No  
Sig: To be given 15 min before meals: < 150 - None, 151-200 - 2 u, 201-250 - 4 u, 251-300 - 6 u, 301-350 - 8 u, 351-400 - 10 u, >400 - 12 u  
 insulin syringe,safetyneedle 1 mL 30 gauge x 5/16\" syrg   No No  
Sig: As directed  
levothyroxine (SYNTHROID) 100 mcg tablet   No No  
Sig: Take 1 Tab by mouth Daily (before breakfast). Indications: hypothyroidism Patient taking differently: Take 75 mcg by mouth Daily (before breakfast). Indications: a condition with low thyroid hormone levels  
metOLazone (ZAROXOLYN) 2.5 mg tablet   No No  
Sig: Take 1 Tab by mouth every fourty-eight (48) hours. metaxalone (SKELAXIN) 800 mg tablet   No No  
Sig: Take 1 Tab by mouth four (4) times daily for 5 days. Indications: muscle spasm  
oxybutynin (DITROPAN) 5 mg tablet   Yes No  
Sig: Take 5 mg by mouth two (2) times a day. polyethylene glycol (MIRALAX) 17 gram/dose powder   No No  
Sig: Take 17 g by mouth daily. 1 tablespoon with 8 oz of water daily  
predniSONE (DELTASONE) 20 mg tablet   No No  
Sig: Take 60 mg by mouth daily (with breakfast). 60 MG DAILY X 3 DAYS, THEN 40 MG DAILY X 3 DAYS, THEN 20 MG DAILY X 3 DAYS  
promethazine (PHENERGAN) 25 mg tablet   No No  
Sig: Take 1 Tab by mouth every six (6) hours as needed for Nausea (and withdrawl symptoms). rosuvastatin (CRESTOR) 5 mg tablet   No No  
Sig: TAKE ONE TABLET NIGHTLY  
sertraline (ZOLOFT) 100 mg tablet   Yes No  
Sig: Take 50 mg by mouth daily. Indications: Bipolar Depression  
traZODone (DESYREL) 100 mg tablet   Yes No  
Sig: Take 100 mg by mouth as needed. Indications: major depressive disorder  
umeclidinium-vilanterol (ANORO ELLIPTA) 62.5-25 mcg/actuation inhaler   Yes No  
Sig: Take 1 Puff by inhalation daily. Facility-Administered Medications: None REVIEW OF SYSTEMS:   
 [] Unable to obtain  ROS due to  []mental status change  []sedated   []intubated 
 [x]Total of 12 systems reviewed as follows: 
Review of Systems Constitutional: Patient denies: fever, chills, weight loss HEENT: Patient denies: change in vision, change in hearing, rhinorrhea, sinus congestion, neck pain/stiffness, sore throat, tongue swelling, lip swelling PULMONARY: Patient denies: shortness of breath at rest, dyspnea on exertion,  wheezing Cardiovascular: Patient denies chest pain, palpitations, paroxysmal nocturnal dyspnea, orthopnea, lower extremity edema GASTROINTESTINAL: Patient denies: abdominal pain, nausea, vomiting, diarrhea, constipation, melena, bright red blood per rectum. GENITOURINARY: Patient denies: urinary frequency, urgency, dysuria, hematuria MUSCULOSKELETAL: no recent trauma. DERMATOLOGIC: No rash, no itching, no lesions. ENDOCRINE: No polyuria, polydipsia, no heat or cold intolerance. No recent change in weight. HEMATOLOGICAL: No anemia or easy bruising or bleeding. NEUROLOGIC: No headache, seizures, numbness, tingling or weakness. PSY: No anxiety nor depression Pertinent Positives include :  
Productive cough Low back pain Urinary incontinence Paraparesis Objective: VITALS:   
Visit Vitals /78 Pulse 69 Temp 99.1 °F (37.3 °C) Resp 16 LMP 2012 (Exact Date) SpO2 100% Temp (24hrs), Av °F (37.2 °C), Min:98.8 °F (37.1 °C), Max:99.1 °F (37.3 °C) PHYSICAL EXAM:  
General:     female laying in bed in PRE-OP, no acute distress Head:   Normocephalic, without obvious abnormality Eyes:   Conjunctivae clear, anicteric sclerae. Pupils are equal 
Nose:  Nares normal. No drainage or sinus tenderness. Throat:    Lips, mucosa, and tongue normal.  No Thrush Neck:  no JVD Back:    Tender to palpation lumbar spine Lungs: On 3 L, speaking in complete sentences, rhonchi in anterior lung fields Chest wall:  No tenderness or deformity. No Accessory muscle use. Heart:   Regular rate and rhythm;  no murmur, rub or gallop. Abdomen:   Soft, non-tender. Not distended. Bowel sounds normal. No masses Extremities: No LE edema. Skin:      Not Jaundiced Psych:  Good insight. Not depressed, anxious or agitated. Neurologic: Alert and oriented to person and place. No facial asymmetry. No aphasia or slurred speech. Moving upper extremities. Unable to move lower extremities when asked, though patient said she was trying to follow my command. +[place] LAB DATA REVIEWED:   
No components found for: [place] Recent Labs 12/11/19 
0705 12/11/19 
0340 12/10/19 
1717 * 128* 120*  
K 2.7* 2.8* 3.7 CL 88* 86* 79* CO2 35* 37* 36* BUN 18 21* 21* CREA 0.69 0.82 1.04  
GLU 96 109* 161* CA 8.4* 9.1 9.1 ALB 2.6*  --  3.1* WBC 10.7  --  10.4 HGB 12.3  --  13.7 HCT 35.9  --  39.6 *  --  157 IMAGING RESULTS: 
 []  I have personally reviewed the actual   []CXR  []CT scan MRI Results (most recent): 
Results from Hospital Encounter encounter on 12/10/19 MRI CERV SPINE WO CONT Narrative EXAM: MRI CERV SPINE WO CONT, MRI [facility] SPINE W WO CONT 
 
STUDY DATE: 12/11/2019 6:30 AM 
 
COMPARISON: . INDICATION: Ataxia, acute or sub-acute, infection suspected. Bilateral leg 
weakness TECHNIQUE: Multiplanar multisequence imaging of the cervical, thoracic spine was 
performed without the administration of intravenous contrast. 
 
FINDINGS: 
 
MRI CERVICAL SPINE: 
 
Exam is mildly limited by motion artifact. The cervical spine maintains normal 
alignment. The vertebral bodies maintain normal height and marrow signal. The 
prevertebral and paravertebral soft tissues are within normal limits. The 
cervical spinal cord maintains normal caliber and signal intensity. C2-C3: Disc osteophyte complex with mild left foraminal stenosis. No significant 
right foraminal or spinal canal narrowing. C3-C4: Disc osteophyte complex with mild bilateral foraminal narrowing. No 
significant spinal canal narrowing. C4-C5: Disc osteophyte complex and facet arthropathy. Moderate bilateral 
foraminal narrowing. Mild spinal canal narrowing. C5-C6: Disc osteophyte complex and facet arthropathy. Moderate bilateral 
foraminal narrowing. Mild to moderate spinal canal narrowing. C6-C7: Disc osteophyte complex with severe left and mild right foraminal 
stenosis. Moderate spinal canal stenosis. C7-T1: Left facet arthropathy and uncovertebral osteophytes with moderate left 
foraminal narrowing. No significant spinal canal or right foraminal narrowing. MRI THORACIC SPINE: 
 
There is vertebral body height loss and marrow edema within T9 and T10 vertebral 
bodies, with near complete collapse of the T9 vertebral body. There is 
retropulsion of fracture fragments at T9 by approximately 4 mm, resulting in 
severe spinal canal stenosis with mild cord compression. There is increased T2 
signal intensity within the surrounding thoracic cord which is concerning for 
cord edema. Marrow edema is also present at the posterior aspect of the T8 vertebral body, 
without vertebral body height loss. There is marrow edema involving posterior 
elements of T8-T10 vertebrae, with surrounding soft tissue edema. There is a T5 compression fracture with homogeneously bright signal intensity on T1 and T2-weighted sequences throughout the T5 vertebral body; this suppresses 
on STIR. This may represent chronic compression fracture of the vertebral body 
involved by slow-flow venous malformation (formerly referred to as hemangioma). There is mild reactive marrow edema at T4 vertebral body. There is moderate left and moderate to severe right foraminal stenosis T8-T9. Severe bilateral foraminal stenosis is present at T9-T10. Moderate to severe 
bilateral foraminal stenosis at T10-T11. Moderate bilateral foraminal stenosis 
is present at T5-T6. There is mildly exaggerated thoracic kyphosis. Incidental note is made of 
multiple T2 bright masses in the spleen, the largest measuring 3.3 x 2.5 cm more 
complex appearing.  
 
  
 Impression IMPRESSION: 
 
 MRI cervical spine: 1. Mild to moderate degenerative changes, most pronounced at C6-C7 where there 
is moderate spinal canal stenosis and severe left foraminal stenosis. 2. Additional mild-to-moderate degenerative changes as detailed the body of the 
report. MRI thoracic spine: 1. Findings suggest acute compression fractures at T9 and T10, with severe 
vertebral body height loss at T9 and associated retropulsion of about 4 mm at 
the level of T9. This results in severe spinal canal stenosis at this level, 
with mild cord compression and surrounding cord edema. 2. Severe bilateral foraminal stenosis is also present at T9-T10. Moderate to 
severe bilateral foraminal stenosis at T10-T11. Moderate to severe right 
foraminal stenosis at T8-T9. 
3. Chronic T5 compression fracture without retropulsion. There is moderate 
bilateral foraminal stenosis at T5-T6. 4. 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 
hemangioma. Additional T2 bright lesions or favor represent splenic cysts. Assessment/Plan:  
Acute  Compression fractures T9-T10 Thoracic Spinal Cord compression and cord edema Epidural phlegmon Paraparesis secondary to acute compression fractures and spinal cord compression Low back pain secondary to acute compression fractures, spinal cord compression Hyponatremia improving 120 to 128 to 128 Hypokalemia stable K 2.8 to 2.7 Mechanical fall at home Type 2 DM last A1c 10/8% in 4/2017 COPD without exacerbation on 3L nc at home Sarcoidosis Gout without exacerbation Hypothyroidism Hyperlipidemia Past history of cocaine and heroine abuse ? Consults: Orthopedic, Anesthesia, Tele-Neuro, Pulm  
 
- NPO. Emergent decompression and fusion T6-T11 with Orthopedic Surgery Dr. Erica Nolan. Anesthesiologist spoke to me, will need central line insertion which is being placed now, s/p OR to ICU v SD?  This is to be determined but ICU aware of patient as per Anesthesiology. Defer DVT prophylaxis and pain management to Ortho s/p operation. SCDs for now. And PT/OT once cleared by Ortho as well. Bedrest strict for now. - 5 rounds of potassium 10 mEq has been ordered and being given, will need to monitor closely and replete for K >4  
- Patient on  cc per hour, continue and monitor Na levels closely. - Wil Paniagua who recommended stress dose steroids prior to OR, I have communicated this recommendation to Dr. Raphael Johnson. Continue 3L NC her baseline. Duo nebs prn. She takes anoro ellipta at home- ordered pulmicort and brovana unitl further W. R. Tiki. ECHO ordered. - ISS for DM 
- resume home meds including: allopurinol, synthroid, crestor, oxybutynin. - urine drug screen - Code status: FULL CODE Prophylaxis:  []Lovenox  []Coumadin  []Hep SQ  [x]SCDs  [x]H2B/PPI Disposition:  []Home w/ Family   [] PT,OT,RN   []SNF/LTC   []SAH/Rehab To be determined pending PT/OT evals Risk of deterioration:  []Low    []Moderate  [x]High 
__________________________________________________ Care Plan discussed with: 
  [x]Patient   []Family    []ED Care Manager  []ED Doc   []Specialist : 
  
Total Time Coordinating Admission:      minutes []Total Critical Care Time:    
___________________________________________________ Admitting Physician: ANDRE Melchor

## 2019-12-11 NOTE — PROGRESS NOTES
vss  
Wet reading with fracture and cord compression/stenosis- minimal retropulsion. patient more awake- a+Ox3, motor 1-2/5 BLE, no clear sensory deficit but sedated- 
Discussed surgery RBCA with patient brother and daughter. They al consented.

## 2019-12-11 NOTE — PROGRESS NOTES
PT. Refuse to continue with MRI exam. PT. Said \" I do not want to participate with this any more\". RN informed. PT. Return to ER.  
PT. Moving , pulling at the MRI equipment. Pulling at her IV.

## 2019-12-11 NOTE — PROGRESS NOTES
Problem: Mobility Impaired (Adult and Pediatric) Goal: *Acute Goals and Plan of Care (Insert Text) Description Physical Therapy Goals Initiated 12/11/2019 and to be accomplished within 7 day(s) 1. Patient will roll side to side in bed with moderate assistance . 2.  Patient will move from supine to sit and sit to supine  in bed with maximal assistance. 3.  Patient will transfer from bed to chair and chair to bed with maximal assistance using the least restrictive device AND/OR SLIDING BOARD. 4.  Patient will perform sit to stand with maximal assistance. 5.  Patient will increase sitting balance static good and dynamic fair to increase safety with transfers. Prior Level of Function:  
Patient was modified independence for all mobility including gait using single point cane. Pt was having increased weakness with multiple falls then decreased ability to move legs causing admission to hospital and surgery. Patient lives alone in a single story home 4 steps to enter B handrail assist. Pt has aides split shifts 8 hours per day 7 days a week. Outcome: Progressing Towards Goal 
 PHYSICAL THERAPY EVALUATION Patient: Cyndie Peck (65 y.o. female) Date: 12/11/2019 Primary Diagnosis: Hyponatremia [E87.1] Spinal cord compression ([address] [G95.20] Hyponatremia [E87.1] Hypokalemia [E87.6] Spinal cord compression ([address] [G95.20] Compression fracture of body of thoracic vertebra ([address] [S22.000A] Spinal cord compression ([address] [G95.20] Hypokalemia [E87.6] Procedure(s) (LRB): SPINE THORACIC LAMINECTOMY T6 - T 11 WITH FUSION; K2M (N/A) Day of Surgery Precautions:   Spinal, Fall ASSESSMENT : 
PT orders received and patient cleared by nursing to participate with therapy. Patient is a 62 y.o. female admitted to the hospital due to progress weakness with falls and now s/p thoracic laminectomy T6-T11 with fusion Dr. Jennifer Engle 12/11/19. Patient consents to PT evaluation and treatment. Pt educated on safety, mobility, therex, spine precautions, log rolling technique, and calling nursing for assistance. Pt has decreased sensation B LE. Pt has no AROM and 0/5 strength R LE. Pt has 1/5 strength with slight movement of left ankle PF and left knee extension otherwise 0/5 for L LE. Pt requires maximum assistance for rolling bilaterally. Supine to sit from sidelying maximum assistance/total assistance x2. Laying in bed and sitting edge of bed pt leans to the right side and needs cues and assistance for upright posture. Pt unable to lift UE off the bed while sitting edge of bed today. Pt ended therapy supine in bed with pillow under right side slightly to increase alignment of back in supine with all needs met and ice donned. Patient will benefit from skilled intervention to address the above impairments. Patient's rehabilitation potential is considered to be Fair Factors which may influence rehabilitation potential include:   
[]         None noted 
[]         Mental ability/status [x]         Medical condition 
[]         Home/family situation and support systems 
[]         Safety awareness 
[]         Pain tolerance/management 
[]         Other: PLAN : 
Recommendations and Planned Interventions:   
[x]           Bed Mobility Training             [x]    Neuromuscular Re-Education 
[x]           Transfer Training                   []    Orthotic/Prosthetic Training 
[x]           Gait Training                          [x]    Modalities [x]           Therapeutic Exercises           [x]    Edema Management/Control 
[x]           Therapeutic Activities            [x]    Family Training/Education 
[x]           Patient Education 
[]           Other (comment): Frequency/Duration: Patient will be followed by physical therapy 1-2 times per day/4-7 days per week to address goals. Discharge Recommendations: Inpatient Rehab Further Equipment Recommendations for Discharge: TBD at next level of care SUBJECTIVE:  
Patient stated okay.  OBJECTIVE DATA SUMMARY:  
 
Past Medical History:  
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) 5/11/2017 Urine microalbumin/Creatinine ratio (5/11/2017) = 9 mg/g Contusion of left elbow 5/4/2017 Depression Diabetes mellitus ([address] Diabetic neuropathy associated with type 2 diabetes mellitus ([address] Diastolic dysfunction without heart failure Stable on diuretics Falls frequently Gastroesophageal reflux disease with hiatal hernia Generalized osteoarthritis of multiple sites Gout History of acute renal failure 5/31/2013 History of back injury   
 jumped out of second story window History of hepatitis C   
 treated History of penicillin allergy History of vitamin D deficiency 5/10/2017 Hyperuricemia 5/26/2017 Hypothyroidism Hypoxemia requiring supplemental oxygen 12/29/2014 Intravenous drug user 5/2/2017 Memory difficulty Menopause Mixed connective tissue disease ([address] 5/10/2017 Obesity, Class I, BMI 30-34.9 Olecranon bursitis of right elbow 5/4/2017 Recurrent genital herpes 5/31/2013 Right Achilles tendinitis 5/2/2017 Sarcoidosis Sickle cell trait ([address] Type 2 diabetes with stage 3 chronic kidney disease GFR 30-59 ([facility]) Urge urinary incontinence 5/10/2017 Venous insufficiency Wears glasses Past Surgical History:  
Procedure Laterality Date [address] HX  SECTION    
 HX CYST REMOVAL Left  Left foot HX OTHER SURGICAL Left 2017 S/P incision and drainage of the abscess of the left hand and the left foot (2017 - Dr. Clinton Crawford. Patric Kumar) HX TUBAL LIGATION Barriers to Learning/Limitations: yes;  altered mental status (i.e.Sedation, Confusion) Compensate with: Visual Cues, Verbal Cues, and Tactile Cues Home Situation: 
Home Situation Home Environment: Private residence # Steps to Enter: 4 Rails to Enter: Yes Hand Rails : Bilateral 
One/Two Story Residence: One story Living Alone: Yes Support Systems: (has aides split shift 8 hrs per day) Patient Expects to be Discharged to[de-identified] Rehabilitation facility Current DME Used/Available at Home: [employer], straight, Walker, rolling, Shower chair, Commode, bedside(BSC is broken) Critical Behavior: 
Neurologic State: Alert;Drowsy; Anesthetized Orientation Level: Oriented X4 Cognition: Follows commands;Decreased attention/concentration Safety/Judgement: Fall prevention Psychosocial 
Patient Behaviors: Calm; Cooperative Family  Behaviors: Calm; Cooperative;Supportive Family  Behaviors: Calm; Cooperative;Supportive Skin Integrity: Incision (comment) Skin Integumentary Skin Integrity: Incision (comment) B LE Strength:   
Strength: Grossly decreased, non-functional(R LE 0/5, L LE hip 0/5, quad 1/5, HS 0/5,ankle DF 0/5 PF 1/5) B LE Tone & Sensation:  
Tone: Normal         
Sensation: Impaired(decreased B LE) B LE Range Of Motion: 
AROM: Grossly decreased, non-functional(No AROM R LE Limited L LE ankle PF and quad) PROM: Within functional limits Posture: 
Posture (WDL): Exceptions to [facility] Posture Assessment: Forward head(leans to the Right) Functional Mobility: 
Bed Mobility: 
Rolling: Maximum assistance Supine to Sit: Maximum assistance; Total assistance;Assist x2 Sit to Supine: Maximum assistance; Total assistance;Assist x2 Scooting: Total assistance;Assist x2 Transfers: 
 Not performed due to safety at this time. Balance:  
Sitting: Impaired; With support Sitting - Static: Fair (occasional)(pt unable to move UE to reach for items) Sitting - Dynamic: Poor (constant support) Therapeutic Exercises:  
Reviewed and performed ankle pumps to increase blood flow and circulation. Pain: 
Pain level pre-treatment: 9/10 back Pain level post-treatment: 9/10 back Pain Intervention(s) : Medication (see MAR); Rest, Ice, Repositioning Response to intervention: Nurse notified, See doc flow Activity Tolerance:  
Fair- 
Please refer to the flowsheet for vital signs taken during this treatment. After treatment:  
[]         Patient left in no apparent distress sitting up in chair 
[x]         Patient left in no apparent distress in bed 
[x]         Call bell left within reach [x]   Personal items in reach [x]         Nursing notified Shaunna Nunes 
[x]         Caregiver present 
[]         Bed/chair alarm activated 
[]         SCDs applied COMMUNICATION/EDUCATION:  
[x]         Role of Physical Therapy in the acute care setting. [x]         Fall prevention education was provided and the patient/caregiver indicated understanding. [x]         Patient/family have participated as able in goal setting and plan of care. [x]         Patient/family agree to work toward stated goals and plan of care. []         Patient understands intent and goals of therapy, but is neutral about his/her participation. []         Patient is unable to participate in goal setting/plan of care: ongoing with therapy staff. 
[]         Out of bed with nursing assistance 3-5 times a day. []         Other: Thank you for this referral. 
Ashlee Sellers, PT, DPT Time Calculation: 46 mins Eval Complexity: History: MEDIUM  Complexity : 1-2 comorbidities / personal factors will impact the outcome/ POC Exam:HIGH Complexity : 4+ Standardized tests and measures addressing body structure, function, activity limitation and / or participation in recreation  Presentation: MEDIUM Complexity : Evolving with changing characteristics  Clinical Decision Making:Medium Complexity    Overall Complexity:MEDIUM

## 2019-12-11 NOTE — BRIEF OP NOTE
BRIEF OPERATIVE NOTE Date of Procedure: 12/11/2019 Preoperative Diagnosis: dx 
Postoperative Diagnosis: * No post-op diagnosis entered * Procedure(s): SPINE THORACIC LAMINECTOMY T7 - T 12 WITH FUSION; K2M Surgeon(s) and Role: Chasity Ashraf MD - Primary Surgical Assistant: 0 Surgical Staff: 
Circ-1: Hernan Buckley RN 
Circ-2: Payton Harrison RN 
Circ-Relief: Jaye Andres Radiology Technician: Aisha Villegas Scrub Tech-1: Ronald Norton Surg Asst-1: Edward Randolph Event Time In Time Out Incision Start 12/11/2019 1126 Incision Close Anesthesia: General  
Estimated Blood Loss: 300 Specimens:  
ID Type Source Tests Collected by Time Destination 1 : T-8 bone Preservative Spine  Rolando Schwartz MD 12/11/2019 1200 Pathology 2 : Epidural Space Preservative Spine  Rolando Schwartz MD 12/11/2019 1235 Pathology 1 : Epidural Space #1 Body Fluid Spine CULTURE, ANAEROBIC, CULTURE, BODY FLUID, GRAM STAIN Rolando Schwartz MD 12/11/2019 1235 Microbiology 2 : Epidural Space #2 Body Fluid Spine CULTURE, ANAEROBIC, CULTURE, BODY FLUID, GRAM STAIN Rolando Schwartz MD 12/11/2019 1236 Microbiology Findings: stenosis, instability, epidural inflamation Complications: 0 Implants:  
Implant Name Type Inv. Item Serial No.  Lot No. LRB No. Used Action SCR SPNE POLYAXL 6.5X40MM --  - [id number]  SCR SPNE POLYAXL 6.5X40MM --   [employer] NA N/A 4 Implanted SCR SPNE POLYAXL 5.5X40MM --  - [id number]  SCR SPNE POLYAXL 5.5X40MM --   [employer] NA N/A 2 Implanted SCR SPNE POLYAXL 5.5X35MM --  - [id number]  SCR SPNE POLYAXL 5.5X35MM --   [employer] NA N/A 2 Implanted ELIJAH SPNE CONTRD [id number] -- [place] - [id number]  ELIJAH SPNE CONTRD [id number] -- [place]ODORE  [employer] NA N/A 2 Implanted SET SCREW    [employer] URI NA N/A 8 Implanted IMPLANT RECORD       1 Implanted

## 2019-12-11 NOTE — ED NOTES
I assumed care for the patient this morning pending going to the OR for possible spinal fracture versus mass with cord compression. Anesthesia had asked Dr. Isidoro Sawant to obtain pulmonology and neurology consult given the sarcoid and the rapid correction of sodium. However the sodium corrected an appropriate rate and the patient does not have any cranial nerve dysfunction. I spoke to tele-neurology and they agreed with the assessment that this does not correlate with central demyelination and the sodium had corrected at an appropriate rate and the patient does not have any cranial nerve dysfunction. I also spoke to pulmonology and since they are not in-house and will take some time for them to see the patient if she is not in respiratory distress and she needs to go to the OR then she should go to the OR without delay. Anesthesia was updated about that.

## 2019-12-11 NOTE — PERIOP NOTES
TRANSFER - OUT REPORT: 
 
Verbal report given to Josué Blum RN on [address]  being transferred to CVT stepdown(unit) for routine post - op Report consisted of patients Situation, Background, Assessment and  
Recommendations(SBAR). Information from the following report(s) SBAR, Kardex, Procedure Summary, Intake/Output, MAR and Recent Results was reviewed with the receiving nurse. Lines:  
Peripheral IV 12/11/19 Anterior;Right External jugular (Active) Site Assessment Bleeding 12/11/2019  1:30 PM  
Phlebitis Assessment 0 12/11/2019  1:30 PM  
Infiltration Assessment 0 12/11/2019  1:30 PM  
Dressing Status Loose; Wet 12/11/2019  1:30 PM  
Dressing Type Disk with Chlorhexadine gluconate (CHG) 12/11/2019  1:30 PM  
Action Taken Dressing changed 12/11/2019  1:30 PM  
   
Peripheral IV 12/11/19 Left Forearm (Active) Site Assessment Clean, dry, & intact 12/11/2019  9:46 AM  
Phlebitis Assessment 0 12/11/2019  9:46 AM  
Infiltration Assessment 0 12/11/2019  9:46 AM  
Dressing Status Clean, dry, & intact 12/11/2019  9:46 AM  
Dressing Type Tape;Transparent 12/11/2019  9:46 AM  
Hub Color/Line Status Pink; Infusing 12/11/2019  9:46 AM  
  
Visit Vitals /63 Pulse 90 Temp 98.4 °F (36.9 °C) Resp 17 SpO2 95% Opportunity for questions and clarification was provided. Patient transported with: 
 O2 @ 3 liters Registered Nurse PCA morphine

## 2019-12-11 NOTE — ANESTHESIA PREPROCEDURE EVALUATION
Relevant Problems No relevant active problems Anesthetic History No history of anesthetic complications Review of Systems / Medical History Patient summary reviewed and pertinent labs reviewed Pulmonary COPD: moderate Comments: sarcoidosis Neuro/Psych Psychiatric history Cardiovascular Within defined limits Exercise tolerance: >4 METS 
  
GI/Hepatic/Renal 
Within defined limits Endo/Other Diabetes: type 1 Hypothyroidism Other Findings Physical Exam 
 
Airway Mallampati: II 
TM Distance: 4 - 6 cm Neck ROM: normal range of motion Mouth opening: Normal 
 
 Cardiovascular Regular rate and rhythm,  S1 and S2 normal,  no murmur, click, rub, or gallop Rhythm: regular Rate: normal 
 
 
 
 Dental 
 
Dentition: Full upper dentures Comments: 2 native lower teeth Pulmonary Breath sounds clear to auscultation Abdominal 
GI exam deferred Other Findings Anesthetic Plan ASA: 4, emergent Anesthesia type: general 
 
Monitoring Plan: CVP Post procedure ventilation Induction: Intravenous Anesthetic plan and risks discussed with: Patient

## 2019-12-11 NOTE — PROGRESS NOTES
OR Today T7-12 Lami/Fusion Hx: COPD, DM, T9 and 10 Comp Fx w/ cord compression and paralysi   
VSS, LS clear, Apical pulse regular Dressing clean, dry and intact MOHINDER:90cc, another 30cc now UA: rousseau, concentrated, adequate output about 300cc now PT:bed bound Neuro  Had BLE paralysis before surgery, can move L foot slightly now. Reports numbness waist down but can feel light touch to bilateral feet calves and thighs. Has maintained good strength to BUE. Reports pain is controlled.  
 
Heena Cooley, NP

## 2019-12-11 NOTE — ED NOTES
Bedside shift change report given to Ki Lock RN (oncoming nurse) by Nicolas Parra RN (offgoing nurse). Report included the following information SBAR, ED Summary, MAR and Recent Results.

## 2019-12-11 NOTE — PROGRESS NOTES
TRANSFER - IN REPORT: 
 
Verbal report received from [id number] RN(name) on Stephen RODRIGUEZ [facility]  being received from ER(unit) for ordered procedure Report consisted of patients Situation, Background, Assessment and  
Recommendations(SBAR). Information from the following report(s) SBAR, Kardex, [facility] and Recent Results was reviewed with the receiving nurse. Opportunity for questions and clarification was provided. Assessment completed upon patients arrival to unit and care assumed.

## 2019-12-11 NOTE — ANESTHESIA POSTPROCEDURE EVALUATION
* No procedures listed *. MAC Anesthesia Post Evaluation Multimodal analgesia: multimodal analgesia used between 6 hours prior to anesthesia start to PACU discharge Patient location during evaluation: ED Patient participation: complete - patient cannot participate Level of consciousness: awake Pain score: 0 Pain management: adequate Airway patency: patent Anesthetic complications: no 
Cardiovascular status: acceptable Respiratory status: acceptable Hydration status: acceptable Post anesthesia nausea and vomiting:  none and controlled Vitals Value Taken Time /78 12/11/2019  6:30 AM  
Temp 37.3 °C (99.1 °F) 12/11/2019  6:30 AM  
Pulse 69 12/11/2019  6:30 AM  
Resp 16 12/11/2019  6:30 AM  
SpO2 100 % 12/11/2019  6:30 AM

## 2019-12-11 NOTE — PERIOP NOTES
Rusty Del Cid aware of POCT, potassium 3.3 Sodium 128  And history of Drug  Use no additional orders given.

## 2019-12-11 NOTE — ANESTHESIA POSTPROCEDURE EVALUATION
Procedure(s): SPINE THORACIC LAMINECTOMY T6 - T 11 WITH FUSION; K2M. general 
 
Anesthesia Post Evaluation Multimodal analgesia: multimodal analgesia used between 6 hours prior to anesthesia start to PACU discharge Patient location during evaluation: bedside Patient participation: complete - patient participated Level of consciousness: awake Pain score: 5 Pain management: adequate Airway patency: patent Anesthetic complications: no 
Cardiovascular status: stable Respiratory status: acceptable Hydration status: acceptable Post anesthesia nausea and vomiting:  none Vitals Value Taken Time /63 12/11/2019  3:25 PM  
Temp 36.9 °C (98.4 °F) 12/11/2019  3:25 PM  
Pulse 94 12/11/2019  3:30 PM  
Resp 15 12/11/2019  3:30 PM  
SpO2 97 % 12/11/2019  3:30 PM  
Vitals shown include unvalidated device data.

## 2019-12-11 NOTE — PROGRESS NOTES
Spoke to daughter. Advised her of situation, need for surgery. Consent obtained but she will be in with patients brother to provide written consent. patient somnolent from anesthesia Neuro exam not possible. Arranging for material needed for surgery.

## 2019-12-12 NOTE — PROGRESS NOTES
Plan for ARU if accepted. Reason for Admission:   Hyponatremia [E87.1] Spinal cord compression ([address] [G95.20] Hyponatremia [E87.1] Hypokalemia [E87.6] Spinal cord compression ([address] [G95.20] Compression fracture of body of thoracic vertebra ([address] [S22.000A] Spinal cord compression ([address] [G95.20] Hypokalemia [E87.6] RRAT Score:     32 Resources/supports as identified by patient/family:   Lives with daughter and Sonia Feast friend\" Top Challenges facing patient (as identified by patient/family and CM):     Needs rehabilitation to get strength and mobility back Current Advanced Directive/Advance Care Plan:   no 
                       
Plan for utilizing home health:    After rehab Likelihood of readmission:   HIGH Transition of Care Plan:               
 
 
Initial assessment completed with patient. Cognitive status of patient: oriented to time, place, person and situation. Face sheet information confirmed:  yes. The patient designates [facility]  to participate in her discharge plan and to receive any needed information. This patient lives in a single family home with daughter and man friend. Patient is not able to navigate steps as needed. Prior to hospitalization, patient was considered to be independent with ADLs/IADLS : no . If not independent,  patient needs assist with :  Patient was immobile upon admission. Patient has a current ACP document on file: no The daughter will be available to transport patient home upon discharge. The patient already has , and  medical equipment available in the home. Patient is not currently active with home health. Patient has not stayed in a skilled nursing facility or rehab. ARU in 2017. This patient is on dialysis :no 
 
Currently, the discharge plan is Acute Rehab. The patient states that she can obtain her medications from the pharmacy, and take her medications as directed. Patient's current insurance is [employer]. Care Management Interventions PCP Verified by CM: Yes 
Palliative Care Criteria Met (RRAT>21 & CHF Dx)?: No 
Mode of Transport at Discharge: Self Transition of Care Consult (CM Consult): Other(aru) MyChart Signup: No 
Discharge Durable Medical Equipment: No 
Physical Therapy Consult: Yes Occupational Therapy Consult: Yes Speech Therapy Consult: Yes Current Support Network: Relative's Home Confirm Follow Up Transport: Family Plan discussed with Pt/Family/Caregiver: Yes Discharge Location Discharge Placement: Rehab Unit Subacute DARIA Diaz Case Management [phone] abdomen

## 2019-12-12 NOTE — PROGRESS NOTES
Bedside and Verbal shift change report given to Raquel Saldana RN (oncoming nurse) by Mely Reynolds RN (offgoing nurse).  Report included the following information SBAR, Kardex, Intake/Output, MAR, Recent Results and Cardiac Rhythm SR.

## 2019-12-12 NOTE — PROGRESS NOTES
Problem: Self Care Deficits Care Plan (Adult) Goal: *Acute Goals and Plan of Care (Insert Text) Description Occupational Therapy Goals Initiated 12/12/2019 within 7 day(s). 1.  Patient will perform bed mobility in preparation for selfcare with minimal assistance/contact guard assist.  
2.  Patient will perform functional activity seated EOB for 5-7 minutes with independence and G balance. 3.  Patient will perform lower body dressing with moderate assistance . 4. Patient will perform toilet transfers with moderate assistance . 5. Patient will perform all aspects of toileting with moderate assistance . 6. Patient will participate in upper extremity therapeutic exercise/activities with independence for 5-7 minutes. 7.  Patient will utilize energy conservation techniques during functional activities with verbal cues. Prior Level of Function: Patient needed assist with self-care and used a cane for functional mobility PTA. Pt has aide that comes 8 hours a day to assist with \"All bathing and dressing\". Outcome: Progressing Towards Goal 
  
OCCUPATIONAL THERAPY EVALUATION Patient: Pravin Waldrop (65 y.o. female) Date: 12/12/2019 Primary Diagnosis: Hyponatremia [E87.1] Spinal cord compression ([address] [G95.20] Hyponatremia [E87.1] Hypokalemia [E87.6] Spinal cord compression ([address] [G95.20] Compression fracture of body of thoracic vertebra ([address] [S22.000A] Spinal cord compression ([address] [G95.20] Hypokalemia [E87.6] Procedure(s) (LRB): SPINE THORACIC LAMINECTOMY T6 - T 11 WITH FUSION; K2M (N/A) 1 Day Post-Op Precautions: Spinal, Fall, Skin ASSESSMENT : 
Pt cleared to participate in OT evaluation by RN. Upon entering the room, the pt was supine in bed, alert, and agreeable to participate in OT evaluation. Pt was seen with PT to maximize pt safety and participation.  Back precautions reviewed and patient verbalized and demonstrated understanding with min cues this session. Post session, patient able to recall 2/3 precautions correctly. Recommend OT continue to educate patient. Patient instructed on logroll technique to R side for comfort, hugging pillow if needed. Pt able to sit EOB with F balance. Pt initially had 2 LOB, falling to L side while sitting EOB but after sitting 10 minutes, no further LOB observed. Pt stand-by assist for upper body dressing and grooming tasks, total assist for LBD. Pt performed sit to stand with max A x 2 using RW and required assist from LPTA at knees to facilitate stand as her knees were observed buckling. Pt max assist x2 for scooting laterally on bed and total assist x2 for scooting towards HOB once supine. Based on the objective data described below, the patient presents with decreased strength, decreased endurance, decreased independence, decreased functional balance, and decreased functional mobility, which impedes pt performance in basic self-care/ADL tasks. Pt would benefit from skilled OT to restore PLOF and maximize function. Patient will benefit from skilled intervention to address the above impairments. Patient's rehabilitation potential is considered to be Good Factors which may influence rehabilitation potential include:  
[]             None noted [x]             Mental ability/status [x]             Medical condition [x]             Home/family situation and support systems [x]             Safety awareness [x]             Pain tolerance/management 
[]             Other: PLAN : 
Recommendations and Planned Interventions:  
[x]               Self Care Training                  [x]      Therapeutic Activities [x]               Functional Mobility Training   []      Cognitive Retraining 
[x]               Therapeutic Exercises           [x]      Endurance Activities [x]               Balance Training                    [x]      Neuromuscular Re-Education []               Visual/Perceptual Training     [x]      Home Safety Training 
[x]               Patient Education                   [x]      Family Training/Education []               Other (comment): Frequency/Duration: Patient will be followed by occupational therapy 1-2 times per day/4-7 days per week to address goals. Discharge Recommendations: Inpatient Rehab Further Equipment Recommendations for Discharge: N/A  
 
SUBJECTIVE:  
Patient stated it feels like something sharp is in my upper back OBJECTIVE DATA SUMMARY:  
 
Past Medical History:  
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 congestive heart failure ([address] Better controlled Bipolar affective disorder ([address] 12/5/2012 Cellulitis of right forearm 5/4/2017 Chronic back pain Chronic lung disease Chronic obstructive pulmonary disease (COPD) ([address] SOB, on paula O2 Recent admission with psychosis CKD stage G3a/A1, GFR 45-59 and albumin creatinine ratio <30 mg/g ([facility]) 5/11/2017 Urine microalbumin/Creatinine ratio (5/11/2017) = 9 mg/g Contusion of left elbow 5/4/2017 Depression Diabetes mellitus ([address] Diabetic neuropathy associated with type 2 diabetes mellitus ([address] Diastolic dysfunction without heart failure Stable on diuretics Falls frequently Gastroesophageal reflux disease with hiatal hernia Generalized osteoarthritis of multiple sites Gout History of acute renal failure 5/31/2013 History of back injury   
 jumped out of second story window History of hepatitis C   
 treated History of penicillin allergy History of vitamin D deficiency 5/10/2017 Hyperuricemia 5/26/2017 Hypothyroidism Hypoxemia requiring supplemental oxygen 2014 Intravenous drug user 2017 Memory difficulty Menopause Mixed connective tissue disease ([facility] 5/10/2017 Obesity, Class I, BMI 30-34.9 Olecranon bursitis of right elbow 2017 Recurrent genital herpes 2013 Right Achilles tendinitis 2017 Sarcoidosis Sickle cell trait ([facility] Type 2 diabetes with stage 3 chronic kidney disease GFR 30-59 ([facility]) Urge urinary incontinence 5/10/2017 Venous insufficiency Wears glasses Past Surgical History:  
Procedure Laterality Date [address] HX  SECTION    
 HX CYST REMOVAL Left 1979 Left foot HX OTHER SURGICAL Left 2017 S/P incision and drainage of the abscess of the left hand and the left foot (2017 - Dr. Monica Cesar. Benitez Hunt) HX TUBAL LIGATION Barriers to Learning/Limitations: None Compensate with: visual, verbal, tactile, kinesthetic cues/model Home Situation:  
Home Situation Home Environment: Private residence # Steps to Enter: 4 Rails to Enter: Yes Hand Rails : Bilateral 
One/Two Story Residence: One story Living Alone: Yes Support Systems: (has aides split shift 8 hrs per day) Patient Expects to be Discharged to[de-identified] Rehabilitation facility Current DME Used/Available at Home: [employer], straight, Walker, rolling, Shower chair, Commode, bedside(BSC is broken) [x]  Right hand dominant   []  Left hand dominant Cognitive/Behavioral Status: 
Neurologic State: Alert Orientation Level: Oriented X4 Cognition: Follows commands Safety/Judgement: Fall prevention Skin: Intact Edema: None noted Vision/Perceptual:    
Acuity: Within Defined Limits Coordination: BUE Fine Motor Skills-Upper: Left Intact; Right Intact Gross Motor Skills-Upper: Left Intact; Right Intact Balance: 
Sitting: Impaired Sitting - Static: Good (unsupported) Sitting - Dynamic: Fair (occasional) Standing: Impaired;Pull to stand Standing - Static: Poor Strength: BUE Strength: Generally decreased, functional 
 
 
Tone & Sensation: BUE Tone: Normal 
Sensation: Intact Range of Motion: BUE 
AROM: Generally decreased, functional 
 
 
Functional Mobility and Transfers for ADLs: 
Bed Mobility: 
Rolling: Moderate assistance Supine to Sit: Minimum assistance; Moderate assistance;Assist x2 Sit to Supine: Moderate assistance;Maximum assistance;Assist x2 Scooting: Total assistance;Assist x2 Transfers: 
Sit to Stand: Maximum assistance;Assist x2 Stand to Sit: Maximum assistance;Assist x2 ADL Assessment:  
Feeding: Setup Oral Facial Hygiene/Grooming: Setup Bathing: Maximum assistance Upper Body Dressing: Stand-by assistance Lower Body Dressing: Maximum assistance; Total assistance Toileting: Maximum assistance; Total assistance ADL Intervention: 
Grooming Grooming Assistance: Set-up; Stand-by assistance Brushing/Combing Hair: Set-up; Stand-by assistance(bed level) Upper Body Dressing Assistance Dressing Assistance: Stand-by assistance Hospital Gown: Stand-by assistance(seated EOB) Lower Body Dressing Assistance Dressing Assistance: Total assistance(dependent) Socks: Total assistance (dependent) Cognitive Retraining Safety/Judgement: Fall prevention Pain: 
Pain level pre-treatment: 8/10 , back Pain level post-treatment: -/10 Pain Intervention(s): Medication (see MAR); Response to intervention:  See doc flow Activity Tolerance:  
Fair Please refer to the flowsheet for vital signs taken during this treatment. After treatment:  
[] Patient left in no apparent distress sitting up in chair 
[x] Patient left in no apparent distress in bed 
[x] Call bell left within reach [x] Nursing notified 
[] Caregiver present 
[] Bed alarm activated COMMUNICATION/EDUCATION:  
[x] Role of Occupational Therapy in the acute care setting [x] Home safety education was provided and the patient/caregiver indicated understanding. [x] Patient/family have participated as able in goal setting and plan of care. [x] Patient/family agree to work toward stated goals and plan of care. [] Patient understands intent and goals of therapy, but is neutral about his/her participation. [] Patient is unable to participate in goal setting and plan of care. Thank you for this referral. 
Tonia Aguilar, OTR/L Time Calculation: 40 mins Eval Complexity: History: MEDIUM Complexity : Expanded review of history including physical, cognitive and psychosocial  history ; Examination: HIGH Complexity : 5 or more performance deficits relating to physical, cognitive , or psychosocial skils that result in activity limitations and / or participation restrictions; Decision Making:HIGH Complexity : Patient presents with comorbidities that affect occupational performance. Signifigant modification of tasks or assistance (eg, physical or verbal) with assessment (s) is necessary to enable patient to complete evaluation

## 2019-12-12 NOTE — PROGRESS NOTES
conducted an initial consultation and Spiritual Assessment for Stephen Sinha, who is a 62 y.o.,female. Patient's Primary Language is: [place]. According to the patient's EMR Restorationist Affiliation is: [place]. The reason the Patient came to the hospital is:  
Patient Active Problem List  
 Diagnosis Date Noted  Bipolar affective disorder ([facility] 12/05/2012 Priority: 1 - One  
 Hyponatremia 12/11/2019  Spinal cord compression ([address] 12/11/2019  Compression fracture of body of thoracic vertebra ([facility]) 12/11/2019  Hyperuricemia 05/26/2017  Hypomagnesemia 05/22/2017  Acute renal failure superimposed on stage 3 chronic kidney disease ([address] 05/15/2017  CKD stage G3a/A1, GFR 45-59 and albumin creatinine ratio <30 mg/g ([facility]) 05/11/2017  Mixed connective tissue disease ([address] 05/10/2017  History of vitamin D deficiency 05/10/2017  Urge urinary incontinence 05/10/2017  History of penicillin allergy  Falls frequently  Gastroesophageal reflux disease with hiatal hernia  Memory difficulty  Cellulitis of arm, right 05/04/2017  Olecranon bursitis of right elbow 05/04/2017  Contusion of left elbow 05/04/2017  Hypokalemia 05/04/2017  Impaired mobility and ADLs 05/04/2017  SIRS (systemic inflammatory response syndrome) ([facility]CC) 05/02/2017  Intravenous drug user 05/02/2017  Right Achilles tendinitis 05/02/2017  Cellulitis and abscess of hand 04/13/2017  Cellulitis and abscess of foot 04/13/2017  Abnormal nuclear stress test 11/17/2016  Hypoxemia requiring supplemental oxygen 12/29/2014  Abscess of right arm 06/03/2013  History of acute renal failure 05/31/2013  Recurrent genital herpes 05/31/2013  Hypothyroidism  Sarcoidosis  Diastolic dysfunction without heart failure  Chronic obstructive pulmonary disease (COPD) ([facility]  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   
 Sickle cell trait ([facility]  Abnormally low high density lipoprotein (HDL) cholesterol with hypertriglyceridemia  Generalized osteoarthritis of multiple sites  Type 2 diabetes with stage 3 chronic kidney disease GFR 30-59 (HCC)  Diabetic neuropathy associated with type 2 diabetes mellitus ([facility]  Venous insufficiency  Obesity, Class I, BMI 30-34.9  Chronic back pain The  provided the following Interventions: 
Initiated a relationship of care and support. Explored issues of agueda, belief, spirituality and Mandaeism/ritual needs while hospitalized. Listened empathically. Provided chaplaincy education. Provided information about Spiritual Care Services. Offered prayer and assurance of continued prayers on patient's behalf. Chart reviewed. The following outcomes where achieved: 
Patient shared limited information about both their medical narrative and spiritual journey/beliefs.  confirmed Patient's Alevism Affiliation. Patient processed feeling about current hospitalization. Patient expressed gratitude for 's visit. Assessment: 
Patient does not have any Mandaeism/cultural needs that will affect patient's preferences in health care. There are no spiritual or Mandaeism issues which require intervention at this time. Plan: 
Chaplains will continue to follow and will provide pastoral care on an as needed/requested basis.  recommends bedside caregivers page  on duty if patient shows signs of acute spiritual or emotional distress. [address] Spiritual Care  
[phone]

## 2019-12-12 NOTE — DIABETES MGMT
Diabetes Patient/Family Education Record Factors That  May Influence Patients Ability  to Learn or  Comply with Recommendations 
 []   Language barrier    []   Cultural needs   []   Motivation  
 []   Cognitive limitation    [x]   Physical: see notes below   [x]   Education  
 []   Physiological factors   []   Hearing/vision/speaking impairment   []   Shinto beliefs []   Financial factors   []  Other:   []  No factors identified at this time. Person Instructed: 
 [x]   Patient   [x]   Family: supportive brother visiting at bedside. []  Other Preference for Learning: 
 [x]   Verbal   [x]   Written: diab educ packet   []  Demonstration Level of Comprehension & Competence:   
[x]  Good                                      [] Fair                                     []  Poor                             []  Needs Reinforcement  
[x]  Teachback completed Education Component:  
[]  Medication management, including how to administer insulin (if appropriate) and potential medication interactions: Yes. Patient reported list of home diabetes medications: 
Lantus insulin 45 units daily every morning. Lispro sliding scale insulin TID AC. [x]  Nutritional management -obtain usual meal pattern: Yes. Patient eating 3 meals daily and knows about the recommended serving size/portion control of carbs (starches, fruits, dairy) and limiting intake of concentrated sweets. Patient stated that she's on diabetic diet but only eating half of meals because she doesn't eat that much at home. 
  
[]  Exercise: Patient is s/p lumbar laminectomy with fusion and instrumentation on 12/11/2019. Patient with history of bilateral LE paralysis before surgery with numbness from waist down. She's beginning to feel light touch to LE/feet and will have to wait for clearance before resuming walking exercise.  Discussed the importance of following PT exercise recommendations for safety and prevention of fall/injury. She verbalized understanding. [x]  Signs, symptoms, and treatment of hyperglycemia and hypoglycemia: Yes. Patient verbalized understanding. [x] Prevention, recognition and treatment of hyperglycemia and hypoglycemia: Yes. Patient verbalized understanding. [x]  Importance of blood glucose monitoring and how to obtain a blood glucose meter: Yes. Patient stated that she's compliant because of sliding scale insulin TID AC. 
   
[]  Instruction on use of the blood glucose meter [x]  Discuss the importance of HbA1C monitoring: Yes. Her current A1c level is 7.1% (12/11/2019) which is equivalent to estimated average blood glucose of 157 mg/dL during the past 2-3 months. Patient reported that her prior A1c level was in the 9% before. She plan to follow her diabetes treatment plan to help achieve and maintain recommended A1c level of <7%. []  Sick day guidelines  
[x]  Proper use and disposal of lancets, needles, syringes or insulin pens (if appropriate) [x]  Potential long-term complications (retinopathy, kidney disease, neuropathy, foot care) [x] Information about whom to contact in case of emergency or for more information   
[x]  Goal:  Patient/family will demonstrate understanding of Diabetes Self Management Skills by: (date) _______ Plan for post-discharge education or self-management support: 
  [x] Outpatient class schedule provided: Patient receptive and accepted the class sched because it has been a while (years) since she last attended diabetes education. [] Patient Declined 
  [] Scheduled for outpatient classes (date) _______ Verify: 
Does patient understand how diabetes medications work? Yes. Does patient know what their most recent A1c is? Yes. Does patient monitor glucose at home? Yes Does patient have difficulty obtaining diabetes medications and testing supplies?  No. 
  
 
Tila Dumont, RN CCM 
 Pager: [phone]

## 2019-12-12 NOTE — REHAB NOTE
vss afeb Neuro improved- can lift heels off of bed,numbness in legs improved. patient very excited by improvement 
motor BLE  2-3/5 Gm stain neg- operative cultures and path pending Labs pending- check on hyponatremia Plan DC PCA - on methadone at home- will need transition Continue abx until cultures back Mobilize PT OT Will likely require ARU- has been there previously

## 2019-12-12 NOTE — PROGRESS NOTES
ARU/IPR REFERRAL CONTACT NOTE [id number] [place] [address] for Physical Rehabilitation RE: Leonor Mohs Referral received to review this patient's case for admission to [id number] [place] [address] for Physical Rehabilitation. Current status reviewed.  When appropriate, will need OT evaluation/treatment on this patient to complete the pre-admission evaluation.  Will continue to follow. Thank you for this referral.  Should you have any questions please do not hesitate to call. Sincerely, Jeane Sarah Admissions Liaison [facility] Physical Rehabilitation 
[phone]

## 2019-12-12 NOTE — PROGRESS NOTES
[employer] [facility] Pulmonary, Critical Care, and Sleep Medicine Pulmonary Medicine Progress Note Name: Saniya Gomez MRN: [id number] : 1955 Hospital: [facility] Date: 2019 Subjective: 
Pt is doing well. Increased movement of her legs after the surgery. Pulmonary wise, on home O2 requirements. No complaints. Patient Active Problem List  
Diagnosis Code  Type 2 diabetes with stage 3 chronic kidney disease GFR 30-59 ([facility]) E11.22, N18.3  Diabetic neuropathy associated with type 2 diabetes mellitus ([facility]) E11.40  Venous insufficiency I87.2  Obesity, Class I, BMI 30-34.9 E66.9  Chronic back pain M54.9, G89.29  
 Generalized osteoarthritis of multiple sites M15.9  Bipolar affective disorder ([facility] F31.9  Abnormally low high density lipoprotein (HDL) cholesterol with hypertriglyceridemia E78.6, E78.1  Diastolic dysfunction without heart failure I51.89  Chronic obstructive pulmonary disease (COPD) ([facility] J44.9  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 congestive heart failure ([facility]) I13.10, N18.3  CKD stage G3a/A1, GFR 45-59 and albumin creatinine ratio <30 mg/g ([facility]) N18.3  Depression F32.9  History of back injury Z87.828  
 Anxiety F41.9  Wears glasses Z97.3  History of hepatitis C Z86.19  
 Sickle cell trait ([facility]) D57.3  History of acute renal failure Z87.448  Hypothyroidism E03.9  Recurrent genital herpes A60.00  Sarcoidosis D86.9  Abscess of right arm L02.413  Hypoxemia requiring supplemental oxygen R09.02, Z99.81  
 Abnormal nuclear stress test R94.39  
 Cellulitis and abscess of hand L03.119, L02.519  
 Cellulitis and abscess of foot L03.119, L02.619  
 SIRS (systemic inflammatory response syndrome) ([facility]) R65.10  Cellulitis of arm, right L03. 113  
 Olecranon bursitis of right elbow M70.21  
 Contusion of left elbow S50. 02XA  Intravenous drug user F19.90  Right Achilles tendinitis M76.61  
 History of penicillin allergy Z88.0  Hypokalemia E87.6  Falls frequently R29.6  Gastroesophageal reflux disease with hiatal hernia K21.9, K44.9  Memory difficulty R41.3  Mixed connective tissue disease ([facility] M35.1  Impaired mobility and ADLs Z74.09  
 Acute renal failure superimposed on stage 3 chronic kidney disease ([facility]) N17.9, N18.3  Hypomagnesemia E83.42  
 Hyperuricemia E79.0  History of vitamin D deficiency Z86.39  
 Urge urinary incontinence N39.41  
 Hyponatremia E87.1  Spinal cord compression ([facility]) G95.20  Compression fracture of body of thoracic vertebra ([facility]) S22.000A Assessment: 
Acute Spinal Cord Compression - s/p emergent laminectomy 12/11 Chronic Respiratory Failure - 2/2 sarcoidosis and COPD, active smoker 
- on anoro at home, follows with Dr. Kerry Hope Hx of COPD 
- not in exacerbation Hx of sarcoidosis Hyponatremia Hypokalemia 
  
Impression/Plan: - Doing well on NC 
- Stress dose steroids end today, then 40mg prednisone daily as her home dose Cleda Nearing and pulmicort, restart home inhaler when discharged - Pulmonary toilet, IS 
- PT/OT when cleared by ortho Will sign off, will be available if needed 
  
Code status: Full Code FiO2 to keep SpO2 >=92%, HOB >=30 degree, aspiration precautions, aggressive pulmonary toileting, incentive spirometry. Other issues management by primary team and respective consultants. Events and notes from last 24 hours reviewed. Discussed with patient and family, answered all questions to their satisfaction. Care plan discussed with nursing. Labs and images personally seen and available reports reviewed All current medicines are reviewed Medications- Current: 
Current Facility-Administered Medications Medication Dose Route Frequency  cefepime (MAXIPIME) 1 g in sterile water (preservative free) 10 mL IV syringe  1 g IntraVENous Q12H  
 HYDROmorphone (DILAUDID) injection 0.5-1 mg  0.5-1 mg IntraVENous Q4H PRN  
 [START ON 12/13/2019] insulin glargine (LANTUS) injection 20 Units  20 Units SubCUTAneous DAILY  insulin glargine (LANTUS) injection 10 Units  10 Units SubCUTAneous NOW  insulin lispro (HUMALOG) injection   SubCUTAneous TIDAC  glucose chewable tablet 16 g  16 g Oral PRN  
 albuterol-ipratropium (DUO-NEB) 2.5 MG-0.5 MG/3 ML  3 mL Nebulization Q4H PRN  
 allopurinol (ZYLOPRIM) tablet 100 mg  100 mg Oral DAILY  levothyroxine (SYNTHROID) tablet 75 mcg  75 mcg Oral 6am  
 rosuvastatin (CRESTOR) tablet 5 mg  5 mg Oral QHS  arformoterol (BROVANA) neb solution 15 mcg  15 mcg Nebulization BID RT  
 budesonide (PULMICORT) 1,000 mcg/2 mL nebulizer susp  1,000 mcg Nebulization BID RT  
 oxybutynin (DITROPAN) tablet 5 mg  5 mg Oral BID  ARIPiprazole (ABILIFY) tablet 10 mg  10 mg Oral DAILY  divalproex DR (DEPAKOTE) tablet 250 mg  250 mg Oral QHS  polyethylene glycol (MIRALAX) packet 17 g  17 g Oral DAILY  sertraline (ZOLOFT) tablet 50 mg  50 mg Oral DAILY  traZODone (DESYREL) tablet 100 mg  100 mg Oral QHS PRN  
 sodium chloride (NS) flush 5-40 mL  5-40 mL IntraVENous Q8H  
 sodium chloride (NS) flush 5-40 mL  5-40 mL IntraVENous PRN  
 naloxone (NARCAN) injection 0.1 mg  0.1 mg IntraVENous PRN  
 diphenhydrAMINE (BENADRYL) injection 12.5 mg  12.5 mg IntraVENous Q6H PRN  
 bisacodyl (DULCOLAX) tablet 10 mg  10 mg Oral DAILY  sodium chloride (NS) flush 5-40 mL  5-40 mL IntraVENous PRN  
 acetaminophen (TYLENOL) tablet 1,000 mg  1,000 mg Oral Q6H  
 diazePAM (VALIUM) tablet 5 mg  5 mg Oral Q6H PRN  
 LORazepam (ATIVAN) injection 1 mg  1 mg IntraVENous Q6H PRN  
 glucagon (GLUCAGEN) injection 1 mg  1 mg IntraMUSCular PRN  
 dextrose (D50W) injection syrg 12.5-25 g  25-50 mL IntraVENous PRN  
 vancomycin (VANCOCIN) 1,000 mg in 0.9% sodium chloride (MBP/ADV) 250 mL adv  1 g IntraVENous Q12H  
 nicotine (NICODERM CQ) 14 mg/24 hr patch 1 Patch  1 Patch TransDERmal DAILY  famotidine (PF) (PEPCID) 20 mg in 0.9% sodium chloride 10 mL injection  20 mg IntraVENous Q12H  hydrocortisone sod succ (PF) (SOLU-CORTEF) 250 mg in 0.9% sodium chloride 50 mL IVPB  250 mg IntraVENous Q8H  predniSONE (DELTASONE) tablet 40 mg  40 mg Oral DAILY WITH BREAKFAST Objective: 
Vital Signs:   
Visit Vitals /73 (BP 1 Location: Left arm, BP Patient Position: At rest) Pulse 84 Temp 98.6 °F (37 °C) Resp 18 Ht 5' 5\" (1.651 m) Wt 81.6 kg (180 lb) LMP 2012 (Exact Date) SpO2 93% BMI 29.95 kg/m² O2 Device: Nasal cannula O2 Flow Rate (L/min): 3 l/min Temp (24hrs), Av.2 °F (36.8 °C), Min:97.7 °F (36.5 °C), Max:98.6 °F (37 °C) Intake/Output:  
Last shift:       07 -  190 In: 240 [P.O.:240] Out: - Last 3 shifts: 12/10 1901 -  0700 In: 4196.7 [P.O.:240; I.V.:3956.7] Out: 8256 [Urine:2600; Drains:270] Intake/Output Summary (Last 24 hours) at 2019 1247 Last data filed at 2019 4681 Gross per 24 hour Intake 2786.67 ml Output 2670 ml Net 116.67 ml Physical Exam:  
 
General/Neurology: Alert, Awake Head:   Normocephalic, without obvious abnormality Eye:   PERRL, EOM intact Oral:  Mucus membranes moist 
Lung:   Few scattered rhonchi L>R, mild wheezing Heart:   S1 S2 present Abdomen/Back:  Soft, non tender, BS+nt. Lumbar drain in place. Extremities:  No pedal edema. Skin:   Dry, intact Data: 
   
Recent Results (from the past 24 hour(s)) GLUCOSE, POC Collection Time: 19  1:30 PM  
Result Value Ref Range Glucose (POC) 135 (H) 70 - 110 mg/dL GLUCOSE, POC Collection Time: 19  5:22 PM  
Result Value Ref Range Glucose (POC) 202 (H) 70 - 110 mg/dL HEMOGLOBIN A1C WITH EAG Collection Time: 19  5:37 PM  
Result Value Ref Range Hemoglobin A1c 7.1 (H) 4.2 - 5.6 % Est. average glucose 157 mg/dL TSH 3RD GENERATION Collection Time: 12/11/19  5:37 PM  
Result Value Ref Range TSH 0.74 0.36 - 3.74 uIU/mL T4, FREE Collection Time: 12/11/19  5:37 PM  
Result Value Ref Range T4, Free 1.3 0.7 - 1.5 NG/DL  
CBC WITH AUTOMATED DIFF Collection Time: 12/12/19  5:20 AM  
Result Value Ref Range WBC 14.7 (H) 4.6 - 13.2 K/uL  
 RBC 3.17 (L) 4.20 - 5.30 M/uL HGB 8.6 (L) 12.0 - 16.0 g/dL HCT 26.1 (L) 35.0 - 45.0 % MCV 82.3 74.0 - 97.0 FL  
 MCH 27.1 24.0 - 34.0 PG  
 MCHC 33.0 31.0 - 37.0 g/dL  
 RDW 14.7 (H) 11.6 - 14.5 % PLATELET 397 170 - 128 K/uL MPV 9.3 9.2 - 11.8 FL  
 NEUTROPHILS 91 (H) 40 - 73 % LYMPHOCYTES 5 (L) 21 - 52 % MONOCYTES 4 3 - 10 % EOSINOPHILS 0 0 - 5 % BASOPHILS 0 0 - 2 %  
 ABS. NEUTROPHILS 13.2 (H) 1.8 - 8.0 K/UL  
 ABS. LYMPHOCYTES 0.8 (L) 0.9 - 3.6 K/UL  
 ABS. MONOCYTES 0.7 0.05 - 1.2 K/UL  
 ABS. EOSINOPHILS 0.0 0.0 - 0.4 K/UL  
 ABS. BASOPHILS 0.0 0.0 - 0.1 K/UL  
 DF AUTOMATED METABOLIC PANEL, BASIC Collection Time: 12/12/19  5:20 AM  
Result Value Ref Range Sodium 131 (L) 136 - 145 mmol/L Potassium 3.3 (L) 3.5 - 5.5 mmol/L Chloride 96 (L) 100 - 111 mmol/L  
 CO2 28 21 - 32 mmol/L Anion gap 7 3.0 - 18 mmol/L Glucose 199 (H) 74 - 99 mg/dL BUN 16 7.0 - 18 MG/DL Creatinine 0.78 0.6 - 1.3 MG/DL  
 BUN/Creatinine ratio 21 (H) 12 - 20 GFR est AA >60 >60 ml/min/1.73m2 GFR est non-AA >60 >60 ml/min/1.73m2 Calcium 7.5 (L) 8.5 - 10.1 MG/DL MAGNESIUM Collection Time: 12/12/19  5:20 AM  
Result Value Ref Range Magnesium 1.5 (L) 1.6 - 2.6 mg/dL GLUCOSE, POC Collection Time: 12/12/19  7:32 AM  
Result Value Ref Range Glucose (POC) 227 (H) 70 - 110 mg/dL ECHO ADULT FOLLOW-UP OR LIMITED Collection Time: 12/12/19  8:53 AM  
Result Value Ref Range Ao Root D 2.51 cm LVIDd 3.90 3.9 - 5.3 cm  
 LVPWd 1.34 (A) 0.6 - 0.9 cm LVIDs 2.28 cm  IVSd 1.28 (A) 0.6 - 0.9 cm  
 LVOT d 1.83 cm  
 LV Mass .4 (A) 67 - 162 g  
 LV Mass AL Index 93.7 43 - 95 g/m2 Triscuspid Valve Regurgitation Peak Gradient 36.1 mmHg  
 TR Max Velocity 300.22 cm/s Left Ventricular Fractional Shortening by 2D 50.521789473 % Left Ventricular End Diastolic Volume by Teichholz Method 6.18762446233 mL Left Ventricular End Systolic Volume by Teichholz Method 3.56119090946 mL Left Ventricular Stroke Volume by Teichholz Method 09.468791354 mL GLUCOSE, POC Collection Time: 12/12/19 11:55 AM  
Result Value Ref Range Glucose (POC) 278 (H) 70 - 110 mg/dL Chemistry Recent Labs 12/12/19 
0520 12/11/19 
0705 12/11/19 
0340 12/10/19 
1717 * 96 109* 161* * 128* 128* 120*  
K 3.3* 2.7* 2.8* 3.7 CL 96* 88* 86* 79* CO2 28 35* 37* 36* BUN 16 18 21* 21* CREA 0.78 0.69 0.82 1.04  
CA 7.5* 8.4* 9.1 9.1 MG 1.5*  --   --  1.7 AGAP 7 5 5 5 BUCR 21* 26* 26* 20  
AP  --  93  --  108 TP  --  6.7  --  8.1 ALB  --  2.6*  --  3.1*  
GLOB  --  4.1*  --  5.0* AGRAT  --  0.6*  --  0.6* CBC w/Diff Recent Labs 12/12/19 
0520 12/11/19 
0705 12/10/19 
1717 WBC 14.7* 10.7 10.4 RBC 3.17* 4.42 4.91  
HGB 8.6* 12.3 13.7 HCT 26.1* 35.9 39.6  109* 157 GRANS 91* 60 82* LYMPH 5* 33 14* EOS 0 0 0 ABG No results for input(s): PHI, PHI, POC2, PCO2I, PO2, PO2I, HCO3, HCO3I, FIO2, FIO2I in the last 72 hours. Micro Recent Labs 12/11/19 
1236 12/11/19 
1235 12/11/19 
0720 CULT NO GROWTH AFTER 21 HOURS NO GROWTH AFTER 21 HOURS NO GROWTH AFTER 23 HOURS Recent Labs 12/11/19 
1236 12/11/19 
1235 12/11/19 
0720 12/11/19 
6858 CULT NO GROWTH AFTER 21 HOURS NO GROWTH AFTER 21 HOURS NO GROWTH AFTER 23 HOURS NO GROWTH AFTER 23 HOURS  
 
 
CT (Most Recent) Results from Hospital Encounter encounter on 12/10/19 CT HEAD WO CONT Narrative CT HEAD WO CONT History: Abnormal gait (ataxia);  Visual loss, sudden onset ; leg weakness, 
 unable to move legs Technique: 5 mm axial images acquired through the brain. CT scans at this facility are performed using dose optimization technique as 
appropriate with performed exam, to include automated exposure control, 
adjustment of mA and/or kV according to patient's size (including appropriate 
matching for site-specific examinations), or use of iterative reconstruction 
technique. Comparison: May 2013 Findings: Motion degraded imaging Soft tissues: No significant findings. Skull base/calvarium: Unremarkable. Brain: There is no acute intracranial hemorrhage or territorial infarction. Minor amount of scattered white matter lucencies, periventricular and 
subcortical.   
 
Ventricles and cisterns: Unremarkable for age. Orbits: Unremarkable. Paranasal Sinuses: Clear Other findings: None Impression IMPRESSION[de-identified] 
1.  No acute intracranial hemorrhage or territorial infarct. No mass effect.  
-Unremarkable for age Thank you for this referral.  
 
 
XR (Most Recent). CXR reviewed by me and compared with previous CXR Results from Hospital Encounter encounter on 12/10/19 XR CHEST PORT Narrative EXAM: XR CHEST PORT 
 
HISTORY: post line placement COMPARISON: December 10, 2019 FINDINGS: 
 
The tip of the right jugular catheter is at the level of the midportion of the 
superior vena cava. Cardiac size and pulmonary vascular markings appear improved 
consistent with resolving changes of congestive cardiac failure superimposed on 
chronic interstitial lung disease. There is mild blunting of the costophrenic 
angles consistent with small pleural effusions. The bony thorax appears intact. No evidence of pneumothorax. Impression IMPRESSION[de-identified] 
 
1 Central catheter in satisfactory position. 2. Pulmonary vessel markings are less prominent and the cardiac size is smaller 
than on the prior study consistent with resolving changes of congestive cardiac 
failure. See my orders for details Total care time exclusive of procedures with complex decision making, coordination of care and counseling patient performed and > 50% time spent in face to face evaluation as mentioned above. Richard Humphrey MD 
Critical Care Medicine

## 2019-12-12 NOTE — OP NOTES
[address] [place] [address]  
OPERATIVE REPORT Name:  Sinan Mcgill 
MR#:   [id number] :  1955 ACCOUNT #:  [de-identified] DATE OF SERVICE:  2019 PREOPERATIVE DIAGNOSES:  Pathologic fracture at T9, fracture at T10, paraplegia, cord compression. POSTOPERATIVE DIAGNOSES:  Pathologic fracture at T9, fracture at T10, paraplegia, cord compression, possible epidural abscess. PROCEDURES PERFORMED:  T10, T9, T8 laminectomy; T7, T8, T9, T10, T11, T12 posterior thoracic fusion; segmental spinal instrumentation; K2M Henderson type, T7-T8, T11-T12. 
 
SURGEON:  Silke Durbin MD 
 
ASSISTANT:  None. ANESTHESIA:  General endotracheal. 
 
COMPLICATIONS:  None. SPECIMENS REMOVED:  Aerobic and anaerobic cultures of the epidural space. Aerobic and anaerobic x2 biopsy of the T9 vertebral body as well as fractured bone. IMPLANTS:  K2M Henderson pedicle screw system. ESTIMATED BLOOD LOSS:  300 FINDINGS:  The patient had instability in the area of the fracture. Soft tissue quality was terrible. Abnormal looking soft tissue and bone in the area of the laminectomy, sent for later pathologic examination. Epidural inflammatory changes, phlegmon. No gross pus or infection in the area of compression. Material sent for pathologic examination and culture. No gross pus noted. Bone quality poor. PROCEDURE:  Follow induction of endotracheal anesthesia, the patient was turned to prone position on a spinal frame. The patient prepped and draped in the usual fashion. Midline incision was made. The thoracodorsal fascia was incised in a paramedian fashion. Subperiosteal dissection done from T7 to T12. C-arm image verified our surgical level. Bone quality was poor. Soft tissue quality was worse. Inflammatory, abnormal-looking soft tissue and perispinal fat in the area of T8, T9, T10.  Spine was debrided, and tissue sent for pathologic examination. Pedicle screws were placed in the pedicles of T7, T8, T11, and T12 under fluoroscopic guidance with anatomic landmarks. Holes were made with a thoracic awl transpedicularly at each level, palpated in all quadrants in depth, and 6.5 screws placed at T11 and T12; 5.5 screws were placed at T7 and T8. Fixation was acceptable despite the bone quality. A laminectomy was then done of T8, T9, and T10. First, the bone was resected posteriorly, and ligamentous material debrided and filled with a thin cortical shell, then with a 2-mm Kerrison the laminectomy was done bilaterally so as to minimize any further compression to the cord. The laminectomy was done at T10, T9, and T8. Significant stenosis was grossly evident as well as significant instability. Thorough decompression was done from pedicle to pedicle. There was epidural phlegmon with inflamed adherent almost fat-looking tissue to the cord but abnormal in appearance, adherent. This was cultured and sent for permanent pathologic evaluation as well. A transpedicular biopsy was done of T9 utilizing a Pi-Cardia bone biopsy set and fluoroscopic guidance. On the left, transpedicular biopsy was done, and a core sent also for permanent pathologic examination. Once the decompression was accomplished with thorough decompression of the cord from the bottom of T7 to superior aspect of T12, rods were fashioned, fit to screw holes, screws previously placed, and final tightening and torquing done. Combination of cancellous autograft and allograft was packed in the posterior and lateral region of the spine again from the T7 fixation all the way down to the T12 screws. Gelfoam was placed over the laminectomy site. Vancomycin powder instilled for infection prophylaxis. A deep drain was utilized. The spine was then closed in layers, and the skin closed with a subcuticular suture and Dermabond. Deep drain was utilized.   Vancomycin powder was utilized for further infection prophylaxis. The patient maintained hemodynamic stability throughout the procedure. MD SHER [id number] 
D:  12/11/2019 13:17 
T:  12/12/2019 0:03 JOB #:  [id number]

## 2019-12-12 NOTE — PROGRESS NOTES
POD 1 T7-12 Lami/Fusion Hx: COPD, DM, T9 and 10 Comp Fx w/ cord compression and paralysis VSS, LS clear, Apical pulse regular Dressing clean, dry and intact MOHINDER:50cc UA: clear adequate output about 300cc now PT:stood up at side of bed, total assist 
Neuro  Had BLE paralysis before surgery, can move bilateral feet and raise heels improved since yesterday. Numbness BLE improved. Has maintained good strength to BUE. Reports pain is controlled. Plan: will need extensive rehab. HH low, medical team managing w/ serial HH checks. K+ improved from yesterday, magnesium low replacement was given this morning. Calcium low, Oral replacement w/ Vit D ordered.  
 
Radha Caballero NP

## 2019-12-12 NOTE — PROGRESS NOTES
Problem: Mobility Impaired (Adult and Pediatric) Goal: *Acute Goals and Plan of Care (Insert Text) Description Physical Therapy Goals Initiated 12/11/2019 and to be accomplished within 7 day(s) 1. Patient will roll side to side in bed with moderate assistance . 2.  Patient will move from supine to sit and sit to supine  in bed with maximal assistance. 3.  Patient will transfer from bed to chair and chair to bed with maximal assistance using the least restrictive device AND/OR SLIDING BOARD. 4.  Patient will perform sit to stand with maximal assistance. 5.  Patient will increase sitting balance static good and dynamic fair to increase safety with transfers. Prior Level of Function:  
Patient was modified independence for all mobility including gait using single point cane. Pt was having increased weakness with multiple falls then decreased ability to move legs causing admission to hospital and surgery. Patient lives alone in a single story home 4 steps to enter B handrail assist. Pt has aides split shifts 8 hours per day 7 days a week. Outcome: Progressing Towards Goal 
 PHYSICAL THERAPY TREATMENT Patient: Lino Parr (65 y.o. female) Date: 12/12/2019 Diagnosis: Hyponatremia [E87.1] Spinal cord compression ([address] [G95.20] Hyponatremia [E87.1] Hypokalemia [E87.6] Spinal cord compression ([address] [G95.20] Compression fracture of body of thoracic vertebra ([address] [S22.000A] Spinal cord compression ([address] [G95.20] Hypokalemia [E87.6] <principal problem not specified> Procedure(s) (LRB): SPINE THORACIC LAMINECTOMY T6 - T 11 WITH FUSION; K2M (N/A) 1 Day Post-Op Precautions: Spinal, Fall, Skin Chart, physical therapy assessment, plan of care and goals were reviewed. OBJECTIVE/ ASSESSMENT: 
Patient found supine in bed willing to work with PT. Patient seen with OT to maximize safety of patient and staff.  Pt was unable to recall spinal precautions at this time, but was educated prior to mobility. Pt rolled to right side for log roll then sat at EOB. Pt is able to perform LAQ through very limited range denoting quad weakness. Pt is still agreeable to try standing which she was able to complete with max A x 2. Pt requires assistance from this LPTA to stop bilateral LEs from buckling. Pt sat back down to rest, then returned to supine with log roll technique. Pt scooted toward [facility] with 2 person draw sheet transfer and placed in chair position. Pt was coughing throughout tx, but non-productive. Once in chair position pt coughed up beige phlegm. Pt was left with needs in reach. Progression toward goals: 
[x]      Improving appropriately and progressing toward goals 
[]      Improving slowly and progressing toward goals 
[]      Not making progress toward goals and plan of care will be adjusted PLAN: 
Patient continues to benefit from skilled intervention to address the above impairments. Continue treatment per established plan of care. Discharge Recommendations:  Inpatient Rehab Further Equipment Recommendations for Discharge:  rolling walker SUBJECTIVE:  
Patient stated Are there screws in my back, it feels like something is poking out.  OBJECTIVE DATA SUMMARY:  
Critical Behavior: 
Neurologic State: Alert Orientation Level: Oriented X4 Cognition: Follows commands Safety/Judgement: Fall prevention Functional Mobility Training: 
Bed Mobility: 
Rolling: Moderate assistance Supine to Sit: Minimum assistance; Moderate assistance;Assist x2 Sit to Supine: Moderate assistance;Maximum assistance;Assist x2 Scooting: Total assistance;Assist x2 Transfers: 
Sit to Stand: Maximum assistance;Assist x2 Stand to Sit: Maximum assistance;Assist x2 Balance: 
Sitting: Impaired Sitting - Static: Good (unsupported) Sitting - Dynamic: Fair (occasional)(+) Standing: Impaired;Pull to stand Standing - Static: Poor Pain: Pain level pre-treatment: 8 Pain level post-treatment: Not rated Activity Tolerance:  
Fair Please refer to the flowsheet for vital signs taken during this treatment. After treatment:  
[] Patient left in no apparent distress sitting up in chair 
[x] Patient left in no apparent distress in bed 
[x] Call bell left within reach 
[] Nursing notified 
[] Caregiver present 
[] Bed alarm activated 
[] SCDs applied COMMUNICATION/EDUCATION:  
[x]         Role of Physical Therapy [x]         Fall prevention 
[x]         Bed mobility [x]         Transfers 
[x]         Ambulation / gait [x]         Assistive device management 
[]         Stairs [x]         Body mechanics [x]         Position change  
[]         Therapeutic exercise [x]         Activity pacing / energy conservation 
[]         Other: 
   
Jalyn Brooks PTA Time Calculation: 40 mins

## 2019-12-12 NOTE — PROGRESS NOTES
[facility] Hospitalist Group Progress Note Patient: Jose Acuna Age: 62 y.o. : 1955 MR#: [id number] SSN: [id number] Date: 2019 Subjective:  
Patient examined independently by me. She endorses back pain- mid and lower. She denies fever, chills, SOB, SNYDER, chest pain, abd pain, nausea/vomiting. She tolerated her clear liquid diet this morning. Assessment/Plan:  
Acute Compression fractures T9-T10 s/p T7-T12 laminectomy and fusion on , POD #1 Acute Thoracic Spinal Cord compression and cord edema Paraparesis secondary to acute compression fractures and spinal cord compression Low back pain secondary to acute compression fractures, spinal cord compression Hyponatremia improving 120 to 128 to 128 to 131 Hypokalemia improving from 2.7 to 3.3 Mechanical fall at home Type 2 DM last A1c 10.8% in 2017 COPD without exacerbation Sarcoidosis Chronic respiratory failure on 3L nc at home Gout without exacerbation Hypothyroidism Hyperlipidemia Past history of cocaine and heroine abuse ? -- Leukocytosis WBC 14.7 
-- Acute Anemia Hgb 8.6 from 12.3  
-- Hypomagnesemia Mg 1.5 
-- Hypocalcemia Ca 7.5 Consults: Magalie Purdy following. POD #1. Follow upon cultures collected from epidural space and pathology report from biopsy of T9 vertebral body/fractured bone. Defer pain management to Ortho- PCA pump has been discontinued and there is prn dilaudid and standing tylenol . Resume current antibiotics (patient was on vanc and cefepime yesterday) and adjust based on culture results. - Pulm is following. Continue prednisone 40 mg daily, brovana and pulmicort. Continue 3L NC continuous. Pulm signed off. 
- replete potassium 40 meq x1 dose, replete Mg x 1 dose 1 gram IV  
- monitor H/H closely in the setting of major operation - will repeat now  
- TSH 0.74, free T4 1.3. Sodium level is improving.  PAtient oral intake returning to baseline, encourage fluid intake and continue to monitor Na levels. - ECHO pending. 
- PT/OT- recommending Inpatient Rehab Additional Notes:   
 
Case discussed with:  [x]Patient  []Family  []Nursing  []Case Management DVT Prophylaxis:  []Lovenox  []Hep SQ  [x]SCDs  []Coumadin   []On Heparin gtt Objective:  
VS:  
Visit Vitals /75 Pulse 75 Temp 98.4 °F (36.9 °C) Resp 18 Ht 5' 5\" (1.651 m) Wt 81.6 kg (180 lb) LMP 2012 (Exact Date) SpO2 97% BMI 29.95 kg/m² Tmax/24hrs: Temp (24hrs), Av.2 °F (36.8 °C), Min:97.7 °F (36.5 °C), Max:98.4 °F (36.9 °C) Intake/Output Summary (Last 24 hours) at 2019 1040 Last data filed at 2019 8346 Gross per 24 hour Intake 3786.67 ml Output 2670 ml Net 1116.67 ml General:   female laying in bed, no acute distress Cardiovascular:  RRR Pulmonary: on 3L nc,   LSC throughout; respiratory effort WNL 
GI:  +BS in all four quadrants, soft, non-tender Extremities:  No edema Neuro: alert, awake, oriented x3, moving UEs with 5/5 motor strength in UEs, able to left bilateral heels off bed about 1 inch off bed, sensation intact on legs bilaterally. Labs:   
Recent Results (from the past 24 hour(s)) CULTURE, SURGICAL WOUND W GRAM STAIN Collection Time: 19 12:35 PM  
Result Value Ref Range Special Requests: EPIDURAL SPACE 1   
 GRAM STAIN RARE WBC'S    
 GRAM STAIN NO ORGANISMS SEEN Culture result: PENDING   
CULTURE, SURGICAL WOUND W GRAM STAIN Collection Time: 19 12:36 PM  
Result Value Ref Range Special Requests: EPIDURAL SPACE 2   
 GRAM STAIN FEW WBC'S    
 GRAM STAIN NO ORGANISMS SEEN Culture result: PENDING   
GLUCOSE, POC Collection Time: 19  1:30 PM  
Result Value Ref Range Glucose (POC) 135 (H) 70 - 110 mg/dL GLUCOSE, POC Collection Time: 19  5:22 PM  
Result Value Ref Range Glucose (POC) 202 (H) 70 - 110 mg/dL HEMOGLOBIN A1C WITH EAG Collection Time: 12/11/19  5:37 PM  
Result Value Ref Range Hemoglobin A1c 7.1 (H) 4.2 - 5.6 % Est. average glucose 157 mg/dL TSH 3RD GENERATION Collection Time: 12/11/19  5:37 PM  
Result Value Ref Range TSH 0.74 0.36 - 3.74 uIU/mL T4, FREE Collection Time: 12/11/19  5:37 PM  
Result Value Ref Range T4, Free 1.3 0.7 - 1.5 NG/DL  
CBC WITH AUTOMATED DIFF Collection Time: 12/12/19  5:20 AM  
Result Value Ref Range WBC 14.7 (H) 4.6 - 13.2 K/uL  
 RBC 3.17 (L) 4.20 - 5.30 M/uL HGB 8.6 (L) 12.0 - 16.0 g/dL HCT 26.1 (L) 35.0 - 45.0 % MCV 82.3 74.0 - 97.0 FL  
 MCH 27.1 24.0 - 34.0 PG  
 MCHC 33.0 31.0 - 37.0 g/dL  
 RDW 14.7 (H) 11.6 - 14.5 % PLATELET 073 567 - 721 K/uL MPV 9.3 9.2 - 11.8 FL  
 NEUTROPHILS 91 (H) 40 - 73 % LYMPHOCYTES 5 (L) 21 - 52 % MONOCYTES 4 3 - 10 % EOSINOPHILS 0 0 - 5 % BASOPHILS 0 0 - 2 %  
 ABS. NEUTROPHILS 13.2 (H) 1.8 - 8.0 K/UL  
 ABS. LYMPHOCYTES 0.8 (L) 0.9 - 3.6 K/UL  
 ABS. MONOCYTES 0.7 0.05 - 1.2 K/UL  
 ABS. EOSINOPHILS 0.0 0.0 - 0.4 K/UL  
 ABS. BASOPHILS 0.0 0.0 - 0.1 K/UL  
 DF AUTOMATED METABOLIC PANEL, BASIC Collection Time: 12/12/19  5:20 AM  
Result Value Ref Range Sodium 131 (L) 136 - 145 mmol/L Potassium 3.3 (L) 3.5 - 5.5 mmol/L Chloride 96 (L) 100 - 111 mmol/L  
 CO2 28 21 - 32 mmol/L Anion gap 7 3.0 - 18 mmol/L Glucose 199 (H) 74 - 99 mg/dL BUN 16 7.0 - 18 MG/DL Creatinine 0.78 0.6 - 1.3 MG/DL  
 BUN/Creatinine ratio 21 (H) 12 - 20 GFR est AA >60 >60 ml/min/1.73m2 GFR est non-AA >60 >60 ml/min/1.73m2 Calcium 7.5 (L) 8.5 - 10.1 MG/DL MAGNESIUM Collection Time: 12/12/19  5:20 AM  
Result Value Ref Range Magnesium 1.5 (L) 1.6 - 2.6 mg/dL GLUCOSE, POC Collection Time: 12/12/19  7:32 AM  
Result Value Ref Range Glucose (POC) 227 (H) 70 - 110 mg/dL ECHO ADULT FOLLOW-UP OR LIMITED Collection Time: 12/12/19  8:53 AM  
Result Value Ref Range Ao Root D 2.51 cm LVIDd 3.90 3.9 - 5.3 cm  
 LVPWd 1.34 (A) 0.6 - 0.9 cm LVIDs 2.28 cm IVSd 1.28 (A) 0.6 - 0.9 cm  
 LVOT d 1.83 cm  
 LV Mass .4 (A) 67 - 162 g  
 LV Mass AL Index 93.7 43 - 95 g/m2 Triscuspid Valve Regurgitation Peak Gradient 36.1 mmHg  
 TR Max Velocity 300.22 cm/s Left Ventricular Fractional Shortening by 2D 02.996766967 % Left Ventricular End Diastolic Volume by Teichholz Method 8.13015675122 mL Left Ventricular End Systolic Volume by Teichholz Method 0.02353550385 mL Left Ventricular Stroke Volume by Teichholz Method 61.236485088 mL Signed By: ANDRE Suazo December 12, 2019

## 2019-12-12 NOTE — DIABETES MGMT
Glycemic Control Plan of Care 
 
T2DM with current A1c level of 7.1% (12/11/2019). See separate notes, 12/12/2019, for assessment of home diabetes management and education. Patient reported prior A1c level in the 9% and she's planning to cont to follow her diabetes treatment plan to help achieve/maintain recommended A1c level <7%. Home diabetes medications: Patient reported on 12/12/2019: 
Lantus insulin 45 units daily every morning. Lispro sliding scale insulin TID AC. POC BG range on 12/11/2019: 135-202 mg/dL. POC BG report on 12/12/2019 at time of review: 227, 278 mg/dL. Patient on lantus insulin 10 units daily at time of review and correctional lispro insulin ACHS. Discussed elevated BG values with Dr. Evelyn Damon for patient who is currently IV hydrocortisone  250 mg every 8 hours. Recommendation(s): 
1.) Increase basal lantus insulin dose to 20 units daily starting today, 12/12/2019. 
2.) Cont monitoring and titrate lantus dose to 25 units daily if BG remain above target range. Assessment: 
Patient is 62year old with past medical history including type 2 diabetes mellitus, obesity, GED, mixed connective tissue disease, recurrent genital herpes, sarcoidosis, hypothyroidism, CKD stage 3, COPD, hypertriglyceridemia, bipolar affective disorder, chronic back pain and on Methadone - was admitted on 12/10/2019 with report that she can't move her legs. Noted: 
Pathologic fracture thoracic, paraplegia and cord compression. Status post thoracic laminectomy with fusion and instrumentation of 12/11/2019. T2DM. Most recent blood glucose values: 
 
Results for Maty Rhoeds (MRN [id number]) as of 12/12/2019 13:46 Ref. Range 12/11/2019 09:16 12/11/2019 13:30 12/11/2019 17:22 GLUCOSE,FAST - POC Latest Ref Range: 70 - 110 mg/dL 106 135 (H) 202 (H) Results for Maty Rhodes (MRN [id number]) as of 12/12/2019 13:46 Ref.  Range 12/12/2019 07:32 12/12/2019 11:55  
 GLUCOSE,FAST - POC Latest Ref Range: 70 - 110 mg/dL 227 (H) 278 (H) Current A1C: 7.1% (12/11/2019) which is equivalent to estimated average blood glucose of 157 mg/dL which is equivalent to estimated average blood glucose of 157 mg/dL during the past 2-3 months. Current hospital diabetes medications: 
Basal lantus insulin 20 units daily. Correctional lispro insulin ACHS. Very resistant dose. Total daily dose insulin requirement previous day: 12/11/2019: 
Lispro: 4 units Diet: Cardiac regular; consistent carb 1800kcal. 
 
Goals:  Blood glucose will be within target range of  mg/dL by 12/15/2019 Education:  _X__  Refer to Diabetes Education Record: 12/12/2019 
           ___  Education not indicated at this time

## 2019-12-13 NOTE — PROGRESS NOTES
vss afeb Neuro improving Pain controlled Stood with PTOT Plan Cultures NTD, path pending PT OT 
ARU if possible Enema for constipation Bladder scan to make certain patient not in retention

## 2019-12-13 NOTE — PROGRESS NOTES
POD 2 T7-12 Lami/Fusion Hx: COPD, DM, T9 and 10 Comp Fx w/ cord compression and paralysis                 
VSS, LS clear, Apical pulse regular Dressing clean, dry and intact UA: clear adequate output about 300cc now GI: Good results w/ enema PT:stood up at side of bed, total assist 
Neuro  Had BLE paralysis before surgery, can move bilateral feet and raise heels improved since yesterday. Numbness BLE improved. Has maintained good strength to BUE. Reports pain is controlled. Plan: will need extensive rehab. HH low, medical team managing w/ serial HH checks and Iron. K+ low being managed by medical team. Calcium improved from yesterday. Ok to go to ARU from our end.  
 
Ferny Naqvi NP

## 2019-12-13 NOTE — DIABETES MGMT
Glycemic Control Plan of Care 
 
T2DM with current A1c level of 7.1% (12/11/2019). See separate notes, 12/12/2019, for assessment of home diabetes management and education. Patient reported prior A1c level in the 9% and she's planning to cont to follow her diabetes treatment plan to help achieve/maintain recommended A1c level <7%. Home diabetes medications: Patient reported on 12/12/2019: 
Lantus insulin 45 units daily every morning. Lispro sliding scale insulin TID AC. POC BG range on 12/12/2019: 183-278 mg/dL. Increased basal lantus insulin dose to 20 units daily and correctional lispro insulin to very resistant dose. POC BG report on 12/13/2019 at time of review: 235, 158 mg/dL. Current Meal Intake: 
Patient Vitals for the past 100 hrs: 
 % Diet Eaten 12/13/19 0938 100 % 12/12/19 1410 25 % 12/12/19 0918 50 % Recommendation(s): 
1.) Cont monitoring and titrate lantus dose to 25 units scott to keep BG <180. Assessment: 
Patient is 62year old with past medical history including type 2 diabetes mellitus, obesity, GED, mixed connective tissue disease, recurrent genital herpes, sarcoidosis, hypothyroidism, CKD stage 3, COPD, hypertriglyceridemia, bipolar affective disorder, chronic back pain and on Methadone - was admitted on 12/10/2019 with report that she can't move her legs. Noted: 
Pathologic fracture thoracic, paraplegia and cord compression. Status post thoracic laminectomy with fusion and instrumentation of 12/11/2019. T2DM. Most recent blood glucose values: 
 
Results for Macey Aguilar (MRN [id number]) as of 12/13/2019 13:11 Ref. Range 12/12/2019 07:32 12/12/2019 11:55 12/12/2019 15:41 GLUCOSE,FAST - POC Latest Ref Range: 70 - 110 mg/dL 227 (H) 278 (H) 187 (H) Results for Macey Aguilar (MRN [id number]) as of 12/13/2019 13:11 Ref. Range 12/13/2019 07:46 12/13/2019 11:18  
GLUCOSE,FAST - POC Latest Ref Range: 70 - 110 mg/dL 235 (H) 158 (H) Current A1C: 7.1% (12/11/2019) which is equivalent to estimated average blood glucose of 157 mg/dL which is equivalent to estimated average blood glucose of 157 mg/dL during the past 2-3 months. Current hospital diabetes medications: 
Basal lantus insulin 20 units daily. Correctional lispro insulin ACHS. Very resistant dose. Total daily dose insulin requirement previous day: 12/12/2019: 
Lantus: 20 units Lispro: 13 units TDD insulin : 33 units Diet: Cardiac regular; consistent carb 1800kcal. 
 
Goals:  Blood glucose will be within target range of  mg/dL by 12/16/2019 Education:  _X__  Refer to Diabetes Education Record: 12/12/2019 
           ___  Education not indicated at this time

## 2019-12-13 NOTE — PROGRESS NOTES
Called [facility] methadone clinic and spoke with the nurse in regards to pt's methadone strength and frequency. He confirmed pt takes 25mg daily. Last dose received on 12/08/2019. Dr. Leelee Elizabeth notified.

## 2019-12-13 NOTE — PROGRESS NOTES
Problem: Mobility Impaired (Adult and Pediatric) Goal: *Acute Goals and Plan of Care (Insert Text) Description Physical Therapy Goals Initiated 12/11/2019 and to be accomplished within 7 day(s) 1. Patient will roll side to side in bed with moderate assistance . 2.  Patient will move from supine to sit and sit to supine  in bed with maximal assistance. 3.  Patient will transfer from bed to chair and chair to bed with maximal assistance using the least restrictive device AND/OR SLIDING BOARD. 4.  Patient will perform sit to stand with maximal assistance. 5.  Patient will increase sitting balance static good and dynamic fair to increase safety with transfers. Prior Level of Function:  
Patient was modified independence for all mobility including gait using single point cane. Pt was having increased weakness with multiple falls then decreased ability to move legs causing admission to hospital and surgery. Patient lives alone in a single story home 4 steps to enter B handrail assist. Pt has aides split shifts 8 hours per day 7 days a week. Outcome: Progressing Towards Goal 
 PHYSICAL THERAPY TREATMENT Patient: Zahida Aranda (65 y.o. female) Date: 12/13/2019 Diagnosis: Hyponatremia [E87.1] Spinal cord compression ([address] [G95.20] Hyponatremia [E87.1] Hypokalemia [E87.6] Spinal cord compression ([address] [G95.20] Compression fracture of body of thoracic vertebra ([address] [S22.000A] Spinal cord compression ([address] [G95.20] Hypokalemia [E87.6] <principal problem not specified> Procedure(s) (LRB): SPINE THORACIC LAMINECTOMY T6 - T 11 WITH FUSION; K2M (N/A) 2 Days Post-Op Precautions: Spinal, Fall, Skin Chart, physical therapy assessment, plan of care and goals were reviewed. OBJECTIVE/ ASSESSMENT: 
Patient found supine in bed willing to work with PT. Patient seen with OT to maximize safety of patient and staff.  Pt is now able to lift bilateral LEs when pillow is behind her knees. Pt able to recall all spinal precautions and performed logroll supine to sit. Pt's sitting balance increased and she attempted to stand with RW. Pt continues to present with bilateral knee buckling and needed to sit. SPT was then performed from bed to b/s chair, requiring max/ total A. Pt had large BM, then leaned forward for tyrell-care from [address] Pt then stood for SPT and further tyrell-care performed by REGALADO while pt is standing. Pt returned to supine with logroll technique and left with needs in reach. Progression toward goals: 
[x]      Improving appropriately and progressing toward goals 
[]      Improving slowly and progressing toward goals 
[]      Not making progress toward goals and plan of care will be adjusted PLAN: 
Patient continues to benefit from skilled intervention to address the above impairments. Continue treatment per established plan of care. Discharge Recommendations:  Inpatient Rehab - pt is highly motivated to recover Further Equipment Recommendations for Discharge:  rolling walker SUBJECTIVE:  
Patient stated I'm not going to smoke anymore.  OBJECTIVE DATA SUMMARY:  
Critical Behavior: 
Neurologic State: Alert Orientation Level: Oriented X4 Cognition: Follows commands Safety/Judgement: Fall prevention Functional Mobility Training: 
Bed Mobility: 
Rolling: Moderate assistance Supine to Sit: Total assistance;Assist x2(Mod of 2 ) Sit to Supine: Moderate assistance;Maximum assistance;Assist x2 Transfers: 
Sit to Stand: Maximum assistance;Assist x2 Stand to Sit: Maximum assistance;Assist x2 Bed to Chair: Moderate assistance; Total assistance Balance: 
Sitting: Intact; With support Standing: Impaired; With support(unable to complete full stand) Standing - Static: Poor Pain: 
Pain level pre-treatment: Not rated Pain level post-treatment: Not rated Activity Tolerance:  
Fair Please refer to the flowsheet for vital signs taken during this treatment. After treatment:  
[] Patient left in no apparent distress sitting up in chair 
[x] Patient left in no apparent distress in bed 
[x] Call bell left within reach [x] Nursing notified 
[] Caregiver present 
[] Bed alarm activated 
[] SCDs applied COMMUNICATION/EDUCATION:  
[x]         Role of Physical Therapy [x]         Fall prevention 
[x]         Bed mobility [x]         Transfers 
[x]         Ambulation / gait [x]         Assistive device management 
[]         Stairs [x]         Body mechanics [x]         Position change  
[]         Therapeutic exercise [x]         Activity pacing / energy conservation 
[]         Other: 
   
Smith Little PTA Time Calculation: 55 mins

## 2019-12-13 NOTE — PROGRESS NOTES
New orders acknowledged. Pt is currently on PT caseload and will continue to be seen for treatment. Thank you.

## 2019-12-13 NOTE — PROGRESS NOTES
New OT orders acknowledge. Pt is currently on OT caseload and will continue POC. Thank you Enrique Bo, SABINE/JANNET

## 2019-12-13 NOTE — PROGRESS NOTES
ARU to interview patient today, patient reported she will have 24/7 supervision following rehab and has lots of family support. Patient must be off of IV pain medicine for 24hours prior to d/c. Per MD, patient will be ready for discharge over the weekend, Gopi Boyd at [facility] notified. Mika Turnerr, [facility] Case Management [phone]

## 2019-12-13 NOTE — PROGRESS NOTES
[facility] Patient: Rosalin Lanes Age: 62 y.o. : 1955 MR#: [id number] SSN: [id number] Date: 2019 Subjective:  
Patient examined independently by me. She endorses improved back pain. She denies fever, chills, chest pain, palpitations, SOB, abd pain, nausea/vomiting. Assessment/Plan:  
Acute Compression fractures T9-T10 s/p T7-T12 laminectomy and fusion on , POD #2 Acute Thoracic Spinal Cord compression and cord edema    
Paraparesis secondary to acute compression fractures and spinal cord compression   
Low back pain secondary to acute compression fractures, spinal cord compression  
Mechanical fall at home  
Type 2 DM last A1c 10.8% in 2017  
COPD without exacerbation  
Sarcoidosis Chronic respiratory failure on 3L nc at home  
Gout without exacerbation Hypothyroidism Hyperlipidemia Past history of cocaine and heroine abuse ? 
-- Hyponatremia resolved -- Leukocytosis WBC 16.4 from 14.7 
-- Acute Anemia Hgb 7.6 from 8.6 from 12.3  
-- Acute thrombocytopenia platelet count 818  
-- Hypomagnesemia resolved -- Hypocalcemia Ca 7.5 
-- Hypokalemia 3.2 
-- Low iron 11 Consults: Luana Retana following. POD #2. Cultures from OR are neg to date. Empiric antibiotics have been discontinued. Pathology pending. Continue pain control. Bladder scan per Ortho to make sure patient not retaining.   
- continue prednisone, brovana, pulmicort, NC 3L. - replete potassium 40 meq x2 doses and recheck in AM 
- continue to monitor H/H. It is downtrending s/p surgery. No evidence of obvious bleeding. Check fecal occult blood  
- start iron supplementation  
- enema for constipation, in addition to miralax prn and senna colace - RN confirmed that patient does take methadone 25 mg daily, last dose on 2019, will resume. - continue PT/OT - needs acute rehab. Additional Notes: Case discussed with:  [x]Patient  []Family  []Nursing  []Case Management DVT Prophylaxis:  []Lovenox  []Hep SQ  [x]SCDs  []Coumadin   []On Heparin gtt Objective:  
VS:  
Visit Vitals /70 Pulse 76 Temp 98.1 °F (36.7 °C) Resp 18 Ht 5' 5\" (1.651 m) Wt 81.6 kg (180 lb) LMP 2012 (Exact Date) SpO2 98% BMI 29.95 kg/m² Tmax/24hrs: Temp (24hrs), Av.1 °F (36.7 °C), Min:97.6 °F (36.4 °C), Max:98.4 °F (36.9 °C) Intake/Output Summary (Last 24 hours) at 2019 1334 Last data filed at 2019 6287 Gross per 24 hour Intake 480 ml Output 2530 ml Net -2050 ml General:   female laying in bed, no acute distress Cardiovascular:  RRR Pulmonary: on 3L nc,   LSC throughout; respiratory effort WNL 
GI:  +BS in all four quadrants, soft, non-tender Extremities:  No edema Neuro: alert, awake, oriented x3, moving UEs with 5/5 motor strength in UEs, able to left bilateral heels off bed about 3-4 inch off bed, sensation intact on legs bilaterally. Motor strength in LEs has improved from yesterday Labs:   
Recent Results (from the past 24 hour(s)) HGB & HCT Collection Time: 19  3:08 PM  
Result Value Ref Range HGB 7.9 (L) 12.0 - 16.0 g/dL HCT 23.5 (L) 35.0 - 45.0 % GLUCOSE, POC Collection Time: 19  3:41 PM  
Result Value Ref Range Glucose (POC) 187 (H) 70 - 110 mg/dL CBC WITH AUTOMATED DIFF Collection Time: 19  5:45 AM  
Result Value Ref Range WBC 16.4 (H) 4.6 - 13.2 K/uL  
 RBC 2.73 (L) 4.20 - 5.30 M/uL HGB 7.6 (L) 12.0 - 16.0 g/dL HCT 22.2 (L) 35.0 - 45.0 % MCV 81.3 74.0 - 97.0 FL  
 MCH 27.8 24.0 - 34.0 PG  
 MCHC 34.2 31.0 - 37.0 g/dL  
 RDW 14.9 (H) 11.6 - 14.5 % PLATELET 911 (L) 908 - 420 K/uL MPV 9.6 9.2 - 11.8 FL  
 NEUTROPHILS 84 (H) 40 - 73 % LYMPHOCYTES 10 (L) 21 - 52 % MONOCYTES 6 3 - 10 % EOSINOPHILS 0 0 - 5 % BASOPHILS 0 0 - 2 % ABS. NEUTROPHILS 13.9 (H) 1.8 - 8.0 K/UL  
 ABS. LYMPHOCYTES 1.6 0.9 - 3.6 K/UL  
 ABS. MONOCYTES 0.9 0.05 - 1.2 K/UL  
 ABS. EOSINOPHILS 0.0 0.0 - 0.4 K/UL  
 ABS. BASOPHILS 0.0 0.0 - 0.1 K/UL  
 DF AUTOMATED METABOLIC PANEL, BASIC Collection Time: 12/13/19  5:45 AM  
Result Value Ref Range Sodium 136 136 - 145 mmol/L Potassium 3.2 (L) 3.5 - 5.5 mmol/L Chloride 100 100 - 111 mmol/L  
 CO2 30 21 - 32 mmol/L Anion gap 6 3.0 - 18 mmol/L Glucose 169 (H) 74 - 99 mg/dL BUN 12 7.0 - 18 MG/DL Creatinine 0.72 0.6 - 1.3 MG/DL  
 BUN/Creatinine ratio 17 12 - 20 GFR est AA >60 >60 ml/min/1.73m2 GFR est non-AA >60 >60 ml/min/1.73m2 Calcium 8.2 (L) 8.5 - 10.1 MG/DL MAGNESIUM Collection Time: 12/13/19  5:45 AM  
Result Value Ref Range Magnesium 1.8 1.6 - 2.6 mg/dL FERRITIN Collection Time: 12/13/19  5:45 AM  
Result Value Ref Range Ferritin 146 8 - 388 NG/ML  
VITAMIN B12 & FOLATE Collection Time: 12/13/19  5:45 AM  
Result Value Ref Range Vitamin B12 393 211 - 911 pg/mL Folate 7.3 3.10 - 17.50 ng/mL IRON PROFILE Collection Time: 12/13/19  5:45 AM  
Result Value Ref Range Iron 11 (L) 50 - 175 ug/dL TIBC 215 (L) 250 - 450 ug/dL Iron % saturation 5 % GLUCOSE, POC Collection Time: 12/13/19  7:46 AM  
Result Value Ref Range Glucose (POC) 235 (H) 70 - 110 mg/dL GLUCOSE, POC Collection Time: 12/13/19 11:18 AM  
Result Value Ref Range Glucose (POC) 158 (H) 70 - 110 mg/dL Signed By: ANDRE Ramirez December 13, 2019

## 2019-12-13 NOTE — PROGRESS NOTES
ARU/IPR REFERRAL CONTACT NOTE [id number] [place] [address] for Physical Rehabilitation RE: Arthuro Lout Referral received to review this patient's case for admission to [id number] [place] [address] for Physical Rehabilitation. Current status reviewed with team and patient meets criteria for admission to [facility] for Physical Rehabilitation pending authorization from [email or web address] [facility]. Case has been opened  and is pending review. Writer will notify  of status/determination once obtained. Thank you for this referral.  Should you have any questions please do not hesitate to call. Sincerely, Jeane Riggs Patitayla Admissions Liaison [facility] 
[phone]

## 2019-12-13 NOTE — PROGRESS NOTES
Progressing towards goals. Problem: Falls - Risk of 
Goal: *Absence of Falls Description Document Steven Ga Fall Risk and appropriate interventions in the flowsheet. Outcome: Progressing Towards Goal 
Note: Fall Risk Interventions: 
Mobility Interventions: Bed/chair exit alarm, Patient to call before getting OOB Medication Interventions: Bed/chair exit alarm, Patient to call before getting OOB Elimination Interventions: Call light in reach History of Falls Interventions: Bed/chair exit alarm Problem: Pressure Injury - Risk of 
Goal: *Prevention of pressure injury Description Document Florian Scale and appropriate interventions in the flowsheet. Outcome: Progressing Towards Goal 
Note: Pressure Injury Interventions: 
Sensory Interventions: Minimize linen layers, Keep linens dry and wrinkle-free Moisture Interventions: Absorbent underpads Activity Interventions: Pressure redistribution bed/mattress(bed type) Mobility Interventions: HOB 30 degrees or less, Turn and reposition approx. every two hours(pillow and wedges) Nutrition Interventions: Document food/fluid/supplement intake Friction and Shear Interventions: Lift team/patient mobility team, Lift sheet, Minimize layers Problem: Patient Education: Go to Patient Education Activity Goal: Patient/Family Education Outcome: Progressing Towards Goal 
  
Problem: Diabetes Self-Management Goal: *Disease process and treatment process Description Define diabetes and identify own type of diabetes; list 3 options for treating diabetes. Outcome: Progressing Towards Goal 
Goal: *Incorporating nutritional management into lifestyle Description Describe effect of type, amount and timing of food on blood glucose; list 3 methods for planning meals. Outcome: Progressing Towards Goal 
Goal: *Monitoring blood glucose, interpreting and using results Description Identify recommended blood glucose targets  and personal targets. Outcome: Progressing Towards Goal 
Goal: *Prevention, detection, treatment of acute complications Description List symptoms of hyper- and hypoglycemia; describe how to treat low blood sugar and actions for lowering  high blood glucose level. Outcome: Progressing Towards Goal 
Goal: *Prevention, detection and treatment of chronic complications Description Define the natural course of diabetes and describe the relationship of blood glucose levels to long term complications of diabetes. Outcome: Progressing Towards Goal 
  
Problem: Patient Education: Go to Patient Education Activity Goal: Patient/Family Education Outcome: Progressing Towards Goal

## 2019-12-13 NOTE — PROGRESS NOTES
Problem: Self Care Deficits Care Plan (Adult) Goal: *Acute Goals and Plan of Care (Insert Text) Description Occupational Therapy Goals Initiated 12/12/2019 within 7 day(s). 1.  Patient will perform bed mobility in preparation for selfcare with minimal assistance/contact guard assist.  
2.  Patient will perform functional activity seated EOB for 5-7 minutes with independence and G balance. 3.  Patient will perform lower body dressing with moderate assistance . 4. Patient will perform toilet transfers with moderate assistance . 5. Patient will perform all aspects of toileting with moderate assistance . 6. Patient will participate in upper extremity therapeutic exercise/activities with independence for 5-7 minutes. 7.  Patient will utilize energy conservation techniques during functional activities with verbal cues. Prior Level of Function: Patient needed assist with self-care and used a cane for functional mobility PTA. Pt has aide that comes 8 hours a day to assist with \"All bathing and dressing\". Outcome: Progressing Towards Goal 
 OCCUPATIONAL THERAPY TREATMENT Patient: Ivan Thompson (65 y.o. female) Date: 12/13/2019 Diagnosis: Hyponatremia [E87.1] Spinal cord compression ([address] [G95.20] Hyponatremia [E87.1] Hypokalemia [E87.6] Spinal cord compression ([address] [G95.20] Compression fracture of body of thoracic vertebra ([address] [S22.000A] Spinal cord compression ([address] [G95.20] Hypokalemia [E87.6] <principal problem not specified> Procedure(s) (LRB): SPINE THORACIC LAMINECTOMY T6 - T 11 WITH FUSION; K2M (N/A) 2 Days Post-Op Precautions: Spinal, Fall, Skin Chart, occupational therapy assessment, plan of care, and goals were reviewed. ASSESSMENT: 
Pt sitting up in bed agreeable to session with request to use [facility] for toileting needs.  Pt seen with PTA to increase safety with mobility as pt requires assistance of 2 due to B LE weakness; PTA focused on mobility while this REGALADO assisted with ADLs. Mod A for log roll technique with pt able to verbalize all spinal precautions. She required Total A of 2(Modx2) for supine to sit using B UEs on bed and assist to bring LEs up and off of bed. Once in sitting, noted pt with increased serosanguinous drainage from incision on spine; RN in room to assist with donning new gauze. Attempted stand to RW Max A x2, however pt unable to extend B LEs and come to stand and complete transfer safely. BSC pulled close for ease of transfer with squat-pivot initiated by PTA; pt Max A for transfer with SBA of this REGALADO for safety as BSC began to move upon pt attempting to sit. Pt completed large BM with extensive hygiene provided at Total A. PTA again assisted pt squat pivot to bed with squat for further hygiene thoroughness provided; REGALADO assisted with guiding hips safely to bed. Pt attempts to lean backwards when sitting EOB/on BSC with mod cues for anterior weight shift to avoid pressure on spine. Pt positioned for comfort in supine with all needs in reach. RN notified of session and pt requesting medication. Progression toward goals: 
[x]          Improving appropriately and progressing toward goals 
[]          Improving slowly and progressing toward goals 
[]          Not making progress toward goals and plan of care will be adjusted PLAN: 
Patient continues to benefit from skilled intervention to address the above impairments. Continue treatment per established plan of care. Discharge Recommendations:  Inpatient Rehab vs SNF pending pt progress Further Equipment Recommendations for Discharge:  3:1 commode SUBJECTIVE:  
Patient stated \"I need to get to the commode.  OBJECTIVE DATA SUMMARY:  
Cognitive/Behavioral Status: 
Neurologic State: Alert Orientation Level: Oriented X4 Cognition: Follows commands Safety/Judgement: Fall prevention Functional Mobility and Transfers for ADLs: 
Bed Mobility: Rolling: Moderate assistance Supine to Sit: Total assistance;Assist x2(Mod of 2 ) Transfers: Toilet Transfer : Maximum assistance(SBA of 2nd person; squat pivot bed>BSC ) Balance: 
Sitting: Intact; With support Standing: Impaired; With support(unable to complete full stand) ADL Intervention: Lower Body Dressing Assistance Dressing Assistance: Total assistance(dependent) Socks: Total assistance (dependent) Position Performed: Long sitting on bed Toileting Toileting Assistance: Total assistance(dependent) Bladder Hygiene: Total assistance (dependent) Bowel Hygiene: Total assistance (dependent) Clothing Management: Contact guard assistance Adaptive Equipment: Elevated seat Cognitive Retraining Safety/Judgement: Fall prevention Pain: 
Pain level pre-treatment: 5/10 Pain level post-treatment: 5/10 Pain medication provided prior to session Activity Tolerance:   
Fair Please refer to the flowsheet for vital signs taken during this treatment. After treatment:  
[]  Patient left in no apparent distress sitting up in chair 
[x]  Patient left in no apparent distress in bed 
[x]  Call bell left within reach [x]  Nursing notified 
[]  Caregiver present 
[]  Bed alarm activated COMMUNICATION/EDUCATION:  
[x] Role of Occupational Therapy in the acute care setting 
[x] Home safety education was provided and the patient/caregiver indicated understanding. [x] Patient/family have participated as able in working towards goals and plan of care. [] Patient/family agree to work toward stated goals and plan of care. [] Patient understands intent and goals of therapy, but is neutral about his/her participation. [] Patient is unable to participate in goal setting and plan of care. Thank you for this referral. 
LON Gage Time Calculation: 56 mins

## 2019-12-13 NOTE — ROUTINE PROCESS
1930 - Bedside and Verbal shift change report given to May Perez RN (oncoming nurse) by Margarita Covarrubias RN (offgoing nurse). Report included the following information SBAR, Kardex, Procedure Summary, Intake/Output, MAR, Med Rec Status and Cardiac Rhythm NSR.

## 2019-12-14 NOTE — PROGRESS NOTES
POD 2 - doing well had 3 BM yesterday T7-12 Lami/Fusion Hx: COPD, DM, T9 and 10 Comp Fx w/ cord compression and paralysis                 
VSS, LS clear, Apical pulse regular Dressing clean, dry and intact UA: clear adequate output about 300cc now GI: Good results w/ enema PT:stood up at side of bed, total assist 
Neuro  Had BLE paralysis before surgery, can move bilateral feet and raise heels improved since yesterday. Numbness BLE improved. Has maintained good strength to BUE. Reports pain is controlled. Plan: will need extensive rehab. H&H stable and electrolytes WNL today. Ok to go to ARU from our end.  
 
ANDRE Agustin

## 2019-12-14 NOTE — PROGRESS NOTES
Progressing towards goals. Problem: Patient Education: Go to Patient Education Activity Goal: Patient/Family Education Outcome: Progressing Towards Goal 
  
Problem: Falls - Risk of 
Goal: *Absence of Falls Description Document Livan Romo Fall Risk and appropriate interventions in the flowsheet. Outcome: Progressing Towards Goal 
Note: Fall Risk Interventions: 
Mobility Interventions: Patient to call before getting OOB, Communicate number of staff needed for ambulation/transfer, Bed/chair exit alarm Medication Interventions: Teach patient to arise slowly, Patient to call before getting OOB, Evaluate medications/consider consulting pharmacy Elimination Interventions: Bed/chair exit alarm, Call light in reach, Patient to call for help with toileting needs History of Falls Interventions: Bed/chair exit alarm, Door open when patient unattended, Evaluate medications/consider consulting pharmacy Problem: Patient Education: Go to Patient Education Activity Goal: Patient/Family Education Outcome: Progressing Towards Goal 
  
Problem: Pressure Injury - Risk of 
Goal: *Prevention of pressure injury Description Document Florian Scale and appropriate interventions in the flowsheet. Outcome: Progressing Towards Goal 
Note: Pressure Injury Interventions: 
Sensory Interventions: Assess need for specialty bed, Assess changes in LOC, Keep linens dry and wrinkle-free Moisture Interventions: Absorbent underpads, Assess need for specialty bed, Check for incontinence Q2 hours and as needed, Apply protective barrier, creams and emollients Activity Interventions: PT/OT evaluation, Pressure redistribution bed/mattress(bed type) Mobility Interventions: PT/OT evaluation, Pressure redistribution bed/mattress (bed type), HOB 30 degrees or less Nutrition Interventions: Document food/fluid/supplement intake Friction and Shear Interventions: Apply protective barrier, creams and emollients, Foam dressings/transparent film/skin sealants, HOB 30 degrees or less Problem: Patient Education: Go to Patient Education Activity Goal: Patient/Family Education Outcome: Progressing Towards Goal

## 2019-12-14 NOTE — PROGRESS NOTES
[facility] Hospitalist Group Progress Note Patient: Isaiah Wang Age: 62 y.o. : 1955 MR#: [id number] SSN: [id number] Date: 2019 Subjective: Patient feels fine, had bowel movements yesterday. No chest pain no shortness of breath. Assessment/Plan: 1. Acute Compression fractures T9-T10 s/p T7-T12 laminectomy and fusion on  2. Acute Thoracic Spinal Cord compression and cord edema    
3. Paraparesis secondary to acute compression fractures and spinal cord compression, slow improvement 4. Mechanical fall at home  
5. Type 2 DM last A1c 10.8% in 2017  
6. COPD without exacerbation  
7. Sarcoidosis 8. Chronic respiratory failure on 3L nc at home  
9. Gout without exacerbation 10. Hypothyroidism 11. Hyperlipidemia 12. Past history of cocaine and heroine abuse  13. Leukocytosis due to steroids 14. Acute Anemia  due to blood loss from surgery 15. Thrombocytopenia, better Consults: Imelda Avilez Plan 1. Patient doing better, working with PT, will DC Renae catheter today. 2. DVT prophylaxis per surgical team, given drop in H&H no chemical DVT prophylaxis but patient on SCD 3. H&H stable, heme-negative stool. 4. continue prednisone taper, brovana, pulmicort, NC 3L.  
5. Continue Lantus and SSI 6. Continue bowel regimen 7. Continue methadone 8. continue PT/OT Awaiting acute rehab acceptance and insurance approval. 
Discussed with patient in detail. Discussed with RN Navneet to transfer out of the stepdown. Case discussed with:  [x]Patient  []Family  []Nursing  []Case Management DVT Prophylaxis:  []Lovenox  []Hep SQ  [x]SCDs  []Coumadin   []On Heparin gtt Objective:  
VS:  
Visit Vitals /63 Pulse 76 Temp 98.3 °F (36.8 °C) Resp 20 Ht 5' 5\" (1.651 m) Wt 81 kg (178 lb 9.6 oz) LMP 2012 (Exact Date) SpO2 97% BMI 29.72 kg/m² Tmax/24hrs: Temp (24hrs), Av.7 °F (37.1 °C), Min:98.1 °F (36.7 °C), Max:99.3 °F (37.4 °C) Intake/Output Summary (Last 24 hours) at 12/14/2019 1158 Last data filed at 12/14/2019 1041 Gross per 24 hour Intake 480 ml Output 3852 ml Net -3372 ml General:   female laying in bed, no acute distress Cardiovascular:  RRR Pulmonary:  Bilateral clear, no wheezing or rails. GI:  +BS, soft, non-tender Extremities:  No edema Neuro: alert, awake, oriented x3, moving UEs with 5/5 motor strength in UEs, able to left bilateral heels off bed about 3-4 inch off bed, right leg slightly weaker than left Labs:   
Recent Results (from the past 24 hour(s)) OCCULT BLOOD, STOOL Collection Time: 12/13/19  4:01 PM  
Result Value Ref Range Occult blood, stool NEGATIVE  NEG    
GLUCOSE, POC Collection Time: 12/13/19  4:59 PM  
Result Value Ref Range Glucose (POC) 192 (H) 70 - 110 mg/dL CBC WITH AUTOMATED DIFF Collection Time: 12/14/19  5:20 AM  
Result Value Ref Range WBC 13.6 (H) 4.6 - 13.2 K/uL  
 RBC 2.80 (L) 4.20 - 5.30 M/uL HGB 7.7 (L) 12.0 - 16.0 g/dL HCT 23.0 (L) 35.0 - 45.0 % MCV 82.1 74.0 - 97.0 FL  
 MCH 27.5 24.0 - 34.0 PG  
 MCHC 33.5 31.0 - 37.0 g/dL  
 RDW 14.8 (H) 11.6 - 14.5 % PLATELET 292 128 - 225 K/uL MPV 10.0 9.2 - 11.8 FL  
 NEUTROPHILS 72 40 - 73 % LYMPHOCYTES 22 21 - 52 % MONOCYTES 6 3 - 10 % EOSINOPHILS 0 0 - 5 % BASOPHILS 0 0 - 2 %  
 ABS. NEUTROPHILS 9.7 (H) 1.8 - 8.0 K/UL  
 ABS. LYMPHOCYTES 3.1 0.9 - 3.6 K/UL  
 ABS. MONOCYTES 0.8 0.05 - 1.2 K/UL  
 ABS. EOSINOPHILS 0.0 0.0 - 0.4 K/UL  
 ABS. BASOPHILS 0.0 0.0 - 0.1 K/UL  
 DF AUTOMATED METABOLIC PANEL, BASIC Collection Time: 12/14/19  5:20 AM  
Result Value Ref Range Sodium 136 136 - 145 mmol/L Potassium 4.0 3.5 - 5.5 mmol/L Chloride 101 100 - 111 mmol/L  
 CO2 32 21 - 32 mmol/L Anion gap 3 3.0 - 18 mmol/L Glucose 91 74 - 99 mg/dL BUN 12 7.0 - 18 MG/DL  Creatinine 0.67 0.6 - 1.3 MG/DL  
 BUN/Creatinine ratio 18 12 - 20 GFR est AA >60 >60 ml/min/1.73m2 GFR est non-AA >60 >60 ml/min/1.73m2 Calcium 8.8 8.5 - 10.1 MG/DL  
GLUCOSE, POC Collection Time: 12/14/19  8:10 AM  
Result Value Ref Range Glucose (POC) 147 (H) 70 - 110 mg/dL GLUCOSE, POC Collection Time: 12/14/19 11:40 AM  
Result Value Ref Range Glucose (POC) 182 (H) 70 - 110 mg/dL Signed By: Mejia Frank MD   
 December 14, 2019

## 2019-12-14 NOTE — PROGRESS NOTES
Problem: Self Care Deficits Care Plan (Adult) Goal: *Acute Goals and Plan of Care (Insert Text) Description Occupational Therapy Goals Initiated 12/12/2019 within 7 day(s). 1.  Patient will perform bed mobility in preparation for selfcare with minimal assistance/contact guard assist.  
2.  Patient will perform functional activity seated EOB for 5-7 minutes with independence and G balance. 3.  Patient will perform lower body dressing with moderate assistance . 4. Patient will perform toilet transfers with moderate assistance . 5. Patient will perform all aspects of toileting with moderate assistance . 6. Patient will participate in upper extremity therapeutic exercise/activities with independence for 5-7 minutes. 7.  Patient will utilize energy conservation techniques during functional activities with verbal cues. Prior Level of Function: Patient needed assist with self-care and used a cane for functional mobility PTA. Pt has aide that comes 8 hours a day to assist with \"All bathing and dressing\". Outcome: Progressing Towards Goal 
 
 OCCUPATIONAL THERAPY TREATMENT Patient: Meme Figueroa (65 y.o. female) Date: 12/14/2019 Diagnosis: Hyponatremia [E87.1] Spinal cord compression ([address] [G95.20] Hyponatremia [E87.1] Hypokalemia [E87.6] Spinal cord compression ([address] [G95.20] Compression fracture of body of thoracic vertebra ([address] [S22.000A] Spinal cord compression ([address] [G95.20] Hypokalemia [E87.6] <principal problem not specified> Procedure(s) (LRB): SPINE THORACIC LAMINECTOMY T6 - T 11 WITH FUSION; K2M (N/A) 3 Days Post-Op Precautions: Spinal, Fall, Skin Chart, occupational therapy assessment, plan of care, and goals were reviewed. ASSESSMENT: 
Pt cleared to participate in OT tx session by RN. Upon entering room, patient supine with HOB elevated, alert and agreeable to therapy session.  Pt seen in conjunction with PT to maximize safety of patient and staff members. Pt reeducated on using logroll technique to follow spinal precautions, with Mod Assist x2 for supine-sit. Pt performed standing with max assist x2 ~10 seconds in prep for [facility] transfer. Noted BM on libby pad, however pt required one rest break sitting EOB, before performing SPT from bed>BSC. Pt required total assist in standing for toilet hygiene, then was returned to seated EOB for RN to change bandage. Pt returned to supine position for comfort. Pt presents with slow progress with therapy but is motivated to increase ADL performance, with plans to discharge to Inpatient Rehab. At the end of the session, pt resting comfortably in bed, call-bell in reach, with all needs met. Progression toward goals: 
[]          Improving appropriately and progressing toward goals [x]          Improving slowly and progressing toward goals 
[]          Not making progress toward goals and plan of care will be adjusted PLAN: 
Patient continues to benefit from skilled intervention to address the above impairments. Continue treatment per established plan of care. Discharge Recommendations: Inpatient Rehab Further Equipment Recommendations for Discharge: Tub transfer bench, RW, [facility]  
 
SUBJECTIVE:  
Patient stated I jumped out a window trying to escape the  referring to her back fracture in 1986. OBJECTIVE DATA SUMMARY:  
Cognitive/Behavioral Status: 
Neurologic State: Alert Orientation Level: Oriented X4 Cognition: Appropriate decision making Safety/Judgement: Fall prevention Functional Mobility and Transfers for ADLs: 
 Bed Mobility: 
Rolling: Moderate assistance Supine to Sit: Moderate assistance;Assist x2 Sit to Supine: Moderate assistance;Assist x2 Transfers: 
Sit to Stand: Maximum assistance;Assist x2 Stand to Sit: Maximum assistance Bed to Chair: Maximum assistance; Total assistance (to [facility]) Balance: 
Sitting: Intact Standing: Impaired; With support Standing - Static: Poor ADL Intervention: Toileting Toileting Assistance: Total assistance(dependent) Bowel Hygiene: Total assistance (dependent) Cognitive Retraining Safety/Judgement: Fall prevention Pain: 
Pain level pre-treatment: 7/10 Pain level post-treatment: 7/10 Pain Intervention(s): Medication (see MAR); Rest, Ice, Repositioning Response to intervention: Nurse notified, See doc flow Activity Tolerance:   
Fair Please refer to the flowsheet for vital signs taken during this treatment. After treatment:  
[x]  Patient left in no apparent distress sitting up in chair 
[]  Patient left in no apparent distress in bed 
[x]  Call bell left within reach [x]  Nursing notified 
[]  Caregiver present 
[]  Bed alarm activated COMMUNICATION/EDUCATION:  
[x] Role of Occupational Therapy in the acute care setting 
[x] Home safety education was provided and the patient/caregiver indicated understanding. [x] Patient/family have participated as able in working towards goals and plan of care. [x] Patient/family agree to work toward stated goals and plan of care. [] Patient understands intent and goals of therapy, but is neutral about his/her participation. [] Patient is unable to participate in goal setting and plan of care. Thank you for this referral. 
Yogesh Loza MS, OTR/L Time Calculation: 59 mins ; ~5 minutes spent outside of pt's room this tx.

## 2019-12-14 NOTE — ROUTINE PROCESS
Bedside and Verbal shift change report given to [facility] (oncoming nurse) by Franky Liomn RN (offgoing nurse). Report included the following information SBAR, Kardex, Procedure Summary, Intake/Output, MAR, Med Rec Status and Cardiac Rhythm NSR.

## 2019-12-14 NOTE — PROGRESS NOTES
Problem: Mobility Impaired (Adult and Pediatric) Goal: *Acute Goals and Plan of Care (Insert Text) Description Physical Therapy Goals Initiated 12/11/2019 and to be accomplished within 7 day(s) 1. Patient will roll side to side in bed with moderate assistance . 2.  Patient will move from supine to sit and sit to supine  in bed with maximal assistance. 3.  Patient will transfer from bed to chair and chair to bed with maximal assistance using the least restrictive device AND/OR SLIDING BOARD. 4.  Patient will perform sit to stand with maximal assistance. 5.  Patient will increase sitting balance static good and dynamic fair to increase safety with transfers. Prior Level of Function:  
Patient was modified independence for all mobility including gait using single point cane. Pt was having increased weakness with multiple falls then decreased ability to move legs causing admission to hospital and surgery. Patient lives alone in a single story home 4 steps to enter B handrail assist. Pt has aides split shifts 8 hours per day 7 days a week. Outcome: Progressing Towards Goal 
 PHYSICAL THERAPY TREATMENT Patient: Rosalin Lanes (65 y.o. female) Date: 12/14/2019 Diagnosis: Hyponatremia [E87.1] Spinal cord compression ([address] [G95.20] Hyponatremia [E87.1] Hypokalemia [E87.6] Spinal cord compression ([address] [G95.20] Compression fracture of body of thoracic vertebra ([address] [S22.000A] Spinal cord compression ([address] [G95.20] Hypokalemia [E87.6] <principal problem not specified> Procedure(s) (LRB): SPINE THORACIC LAMINECTOMY T6 - T 11 WITH FUSION; K2M (N/A) 3 Days Post-Op Precautions: Spinal, Fall, Skin Chart, physical therapy assessment, plan of care and goals were reviewed. OBJECTIVE/ ASSESSMENT: 
Patient found supine in bed willing to work with PT. Patient seen with OT to maximize safety of patient and staff.  Pt sat at EOB this tx with logroll technique then stood to RW. Pt achieved full stand again but presents with hyper extension of bilateral knees. Pt stood ~ 10 seconds before needing to sit and rest. SPT was then performed from EOB to b/s commode where pt had large BM. Pt was assisted to standing by this LPTA and held in place while OT performed tyrell-care. SPT was then performed back to EOB and pt returned to supine with log roll technique. Pt continues to progress slowly and plans to DC to inpatient rehab. Pts dressing changed this tx by nurse Rock Kraus. Progression toward goals: 
[]      Improving appropriately and progressing toward goals [x]      Improving slowly and progressing toward goals 
[]      Not making progress toward goals and plan of care will be adjusted PLAN: 
Patient continues to benefit from skilled intervention to address the above impairments. Continue treatment per established plan of care. Discharge Recommendations:  Inpatient Rehab Further Equipment Recommendations for Discharge:  rolling walker SUBJECTIVE:  
Patient stated I need to get my arms stronger to use a wheelchair.  OBJECTIVE DATA SUMMARY:  
Critical Behavior: 
Neurologic State: Alert Orientation Level: Oriented X4 Cognition: Appropriate decision making, Appropriate for age attention/concentration, Appropriate safety awareness, Follows commands Safety/Judgement: Fall prevention Functional Mobility Training: 
Bed Mobility: 
Rolling: Moderate assistance Supine to Sit: Moderate assistance;Assist x2 Sit to Supine: Moderate assistance;Assist x2 Transfers: 
Sit to Stand: Maximum assistance;Assist x2 Stand to Sit: Maximum assistance; Other (comment) Bed to Chair: Maximum assistance; Total assistance Balance: 
Sitting: Intact Standing: Impaired; With support Standing - Static: Poor Pain: 
Pain level pre-treatment: Not rated Pain level post-treatment: Not rated Activity Tolerance:  
Fair Please refer to the flowsheet for vital signs taken during this treatment. After treatment:  
[] Patient left in no apparent distress sitting up in chair 
[x] Patient left in no apparent distress in bed 
[x] Call bell left within reach 
[] Nursing notified 
[] Caregiver present 
[] Bed alarm activated 
[] SCDs applied COMMUNICATION/EDUCATION:  
[x]         Role of Physical Therapy [x]         Fall prevention 
[x]         Bed mobility [x]         Transfers 
[]         Ambulation / gait [x]         Assistive device management 
[]         Stairs [x]         Body mechanics [x]         Position change  
[]         Therapeutic exercise 
[]         Activity pacing / energy conservation 
[]         Other: 
   
Juanita Rodriguez PTA Time Calculation: 59 mins ~5 minutes spent outside of pt's room this tx.

## 2019-12-14 NOTE — PROGRESS NOTES
1900: Bedside and Verbal shift [facility] Report included the following information SBAR, Kardex, Intake/Output, MAR and Cardiac Rhythm. 0000: Dressing change performed. Pt had breakthrough drainage that was serosanguinous. Sterile gauze and tegaderm. 0500: Dressing changed again. Breakthrough drainage, serosanguinous still. 0730: Bedside and Verbal shift change report given to  Miguel Jefferson RN. Report included the following information SBAR, Kardex, Intake/Output, MAR and Cardiac Rhythm.

## 2019-12-14 NOTE — PROGRESS NOTES
Bedside and Verbal shift change report given to Vipul Vázquez RN (oncoming nurse) by Bere Billings RN (offgoing nurse). Report included the following information SBAR and Kardex.

## 2019-12-14 NOTE — PROGRESS NOTES
TRANSFER - OUT REPORT: 
 
Verbal report given on Stephen Sinha  being transferred to [address] [place] [address](unit) for routine progression of care Report consisted of patients Situation, Background, Assessment and  
Recommendations(SBAR). Information from the following report(s) SBAR, Kardex, Intake/Output, MAR, Recent Results and Med Rec Status was reviewed with the receiving nurse. Lines:  
Peripheral IV 12/11/19 Anterior;Right External jugular (Active) Site Assessment Clean, dry, & intact 12/14/2019  8:00 AM  
Phlebitis Assessment 0 12/14/2019  8:00 AM  
Infiltration Assessment 0 12/14/2019  8:00 AM  
Dressing Status Clean, dry, & intact 12/14/2019  8:00 AM  
Dressing Type Transparent 12/14/2019  8:00 AM  
Hub Color/Line Status Green 12/14/2019  8:00 AM  
Action Taken Open ports on tubing capped 12/14/2019  8:00 AM  
Alcohol Cap Used Yes 12/14/2019  8:00 AM  
   
Peripheral IV 12/11/19 Left Forearm (Active) Site Assessment Clean, dry, & intact 12/14/2019  8:00 AM  
Phlebitis Assessment 0 12/14/2019  8:00 AM  
Infiltration Assessment 0 12/14/2019  8:00 AM  
Dressing Status Clean, dry, & intact 12/14/2019  8:00 AM  
Dressing Type Transparent 12/14/2019  8:00 AM  
Hub Color/Line Status Pink 12/14/2019  8:00 AM  
Action Taken Open ports on tubing capped 12/14/2019  8:00 AM  
Alcohol Cap Used Yes 12/14/2019  8:00 AM  
  
 
Opportunity for questions and clarification was provided. Patient transported with: 
 O2 @ 3 liters

## 2019-12-15 NOTE — ROUTINE PROCESS
Patient is alert and oriented times three with no signs or symptoms of distress. Dressing on the back is clean dry and intact. Neuro is weak in leg region and otherwise intact on upper extremities. Able to sit on the side of the bed under her own power but unable to stand. Patient had a large bowel movement

## 2019-12-15 NOTE — PROGRESS NOTES
Problem: Non-Violent Restraints Goal: *Removal from restraints as soon as assessed to be safe Outcome: Progressing Towards Goal 
Goal: *No harm/injury to patient while restraints in use Outcome: Progressing Towards Goal 
Goal: *Patient's dignity will be maintained Outcome: Progressing Towards Goal 
Goal: *Patient Specific Goal (EDIT GOAL, INSERT TEXT) Outcome: Progressing Towards Goal 
Goal: Non-violent Restaints:Standard Interventions Outcome: Progressing Towards Goal 
Goal: Non-violent Restraints:Patient Interventions Outcome: Progressing Towards Goal 
Goal: Patient/Family Education Outcome: Progressing Towards Goal 
  
Problem: Patient Education: Go to Patient Education Activity Goal: Patient/Family Education Outcome: Progressing Towards Goal 
  
Problem: Falls - Risk of 
Goal: *Absence of Falls Description Document Lavern Ruts Fall Risk and appropriate interventions in the flowsheet. Outcome: Progressing Towards Goal 
Note: Fall Risk Interventions: 
Mobility Interventions: Assess mobility with egress test, Communicate number of staff needed for ambulation/transfer, OT consult for ADLs, Patient to call before getting OOB, PT Consult for mobility concerns Medication Interventions: Assess postural VS orthostatic hypotension, Evaluate medications/consider consulting pharmacy, Patient to call before getting OOB, Teach patient to arise slowly Elimination Interventions: Call light in reach, Elevated toilet seat, Patient to call for help with toileting needs, Stay With Me (per policy), Toilet paper/wipes in reach, Toileting schedule/hourly rounds History of Falls Interventions: Door open when patient unattended, Utilize gait belt for transfer/ambulation Problem: Patient Education: Go to Patient Education Activity Goal: Patient/Family Education Outcome: Progressing Towards Goal 
  
Problem: Pressure Injury - Risk of 
Goal: *Prevention of pressure injury Description Document Florian Scale and appropriate interventions in the flowsheet. Outcome: Progressing Towards Goal 
Note: Pressure Injury Interventions: 
Sensory Interventions: Assess changes in LOC, Avoid rigorous massage over bony prominences, Check visual cues for pain, Discuss PT/OT consult with provider, Keep linens dry and wrinkle-free Moisture Interventions: Absorbent underpads, Assess need for specialty bed, Check for incontinence Q2 hours and as needed, Internal/External urinary devices Activity Interventions: Assess need for specialty bed, Increase time out of bed, Pressure redistribution bed/mattress(bed type), PT/OT evaluation Mobility Interventions: Assess need for specialty bed, HOB 30 degrees or less, Pressure redistribution bed/mattress (bed type), PT/OT evaluation Nutrition Interventions: Document food/fluid/supplement intake Friction and Shear Interventions: Apply protective barrier, creams and emollients, HOB 30 degrees or less, Lift team/patient mobility team 
 
  
 
 
 
  
Problem: Patient Education: Go to Patient Education Activity Goal: Patient/Family Education Outcome: Progressing Towards Goal 
  
Problem: Diabetes Self-Management Goal: *Disease process and treatment process Description Define diabetes and identify own type of diabetes; list 3 options for treating diabetes. Outcome: Progressing Towards Goal 
Goal: *Incorporating nutritional management into lifestyle Description Describe effect of type, amount and timing of food on blood glucose; list 3 methods for planning meals. Outcome: Progressing Towards Goal 
Goal: *Incorporating physical activity into lifestyle Description State effect of exercise on blood glucose levels. Outcome: Progressing Towards Goal 
Goal: *Developing strategies to promote health/change behavior Description Define the ABC's of diabetes; identify appropriate screenings, schedule and personal plan for screenings. Outcome: Progressing Towards Goal 
Goal: *Using medications safely Description State effect of diabetes medications on diabetes; name diabetes medication taking, action and side effects. Outcome: Progressing Towards Goal 
Goal: *Monitoring blood glucose, interpreting and using results Description Identify recommended blood glucose targets  and personal targets. Outcome: Progressing Towards Goal 
Goal: *Prevention, detection, treatment of acute complications Description List symptoms of hyper- and hypoglycemia; describe how to treat low blood sugar and actions for lowering  high blood glucose level. Outcome: Progressing Towards Goal 
Goal: *Prevention, detection and treatment of chronic complications Description Define the natural course of diabetes and describe the relationship of blood glucose levels to long term complications of diabetes. Outcome: Progressing Towards Goal 
Goal: *Developing strategies to address psychosocial issues Description Describe feelings about living with diabetes; identify support needed and support network Outcome: Progressing Towards Goal 
Goal: *Insulin pump training Outcome: Progressing Towards Goal 
Goal: *Sick day guidelines Outcome: Progressing Towards Goal 
Goal: *Patient Specific Goal (EDIT GOAL, INSERT TEXT) Outcome: Progressing Towards Goal 
  
Problem: Patient Education: Go to Patient Education Activity Goal: Patient/Family Education Outcome: Progressing Towards Goal 
  
Problem: Patient Education: Go to Patient Education Activity Goal: Patient/Family Education Outcome: Progressing Towards Goal 
  
Problem: Diabetes Maintenance:Admission Goal: *Blood glucose 80 to 180 mg/dl Outcome: Progressing Towards Goal

## 2019-12-15 NOTE — PROGRESS NOTES
This patient meets the following P&T approved criteria and has been converted from IV to oral therapy for the following medication: Famotidine 1. INITIAL IV Therapy Patient has received an initial 48 hours of IV therapy for azithromycin and levetiracetam OR received an initial 24 hours of IV therapy for all other medications. 2. Clinically Stable HR?100 Pulse (Heart Rate): 69 SBP ?90 BP: 120/74 RR ? 24 Resp Rate: 16 Temp < 100.4° F Temp: 98.1 °F (36.7 °C)  
O2 ?90% O2 Sat (%): 99 % 3. Functional GI Tract Please note that a No response means the patient is not a candidate for IV to PO conversion No active NPO order (check order review activity) Yes Taking enteral meds (PO, NG, GT, etc) (check medication activity or order review for a tube) Yes Able to tolerate enteral medications without risk of aspiration  
(check progress notes) Yes Non-tubed patients can swallow without difficulty 
(check progress notes) Yes Eating or tolerating feeds at goal rate  
(check progress notes, order review activity, LDA flowsheet)  Diet =cardiac Yes NG access is available if patient is unconscious Yes NG tube, if present,  is not on continuous suction  
(check progress notes) NA Continuous tube feedings can be interrupted for an extended period of time for the drug administration (e.g. Ciprofloxacin) (check progress notes, order review) NA No severe or persistent nausea or vomiting      
(check progress notes, use of antiemetics) Yes No malabsorption  syndromes  
(e.g. gastrectomy, intestinal resection, bariatric surgery, uncontrolled diarrhea, short bowel syndrome, ileus, gastrointestinal obstruction) (check progress notes) Yes No active gastrointestinal bleeding    
(check progress notes) Yes 4. Infection Specific Criteria (ABX only) Please note that a No response means the patient is not a candidate for IV to PO conversion WBC normal or normalizing (check labs) NA 
 
WBC = Lab Results Component Value Date/Time WBC 11.4 12/15/2019 06:15 AM  
  
Neutropenia (ANC < 1500 cells/microL) NOT present 
(check labs) NA No infection with high treatment failure rate 
(e.g. meningitis, brain abscess,orbital cellulitis, other central nervous system infections, endophthalmitis, mediastinitis,  IV TMP/SMX treatment for PCP in AIDs, fungemia, ventriculitis, endocarditis, Pseudomonas pneumonia, intra-abdominal abscess, empyema, necrotizing soft tissue infections, osteomyelitis or post-obstructive pneumonia) (check indication for antibiotic, progress notes) NA  
5. Miscellaneous Criteria Please note that a No response means the patient is not a candidate for IV to PO conversion Not on high doses of vasopressor medications  
     (check medication activity) Yes Not actively seizing or risk of actively seizing  
     (check progress notes) Yes Pharmacist Notes: patient meets criteria for IV to PO conversion. Will switch famotidine 20mg IV BID to 20mg PO BID per approved P&T protocol. Pharmacy will continue to monitor for the duration of therapy to ensure the patient continues to meet protocol criteria and the conversion remains appropriate. MAGDALENA Galvez 
12/15/2019

## 2019-12-15 NOTE — PROGRESS NOTES
PROGRESS NOTE 
 
 
POD 3 - doing well - continues to have BMs S/p: T7-12 Lami/Fusion Hx: COPD, DM, T9 and 10 Comp Fx w/ cord compression and paralysis                 
VSS, LS clear, Apical pulse regular Dressing clean, dry and intact UA: clear adequate output about 500cc GI: Good results w/ enema PT:stood up at side of bed, total assist 
Neuro  Had BLE paralysis before surgery, can move bilateral feet and raise heels improved since yesterday. Numbness BLE improved. Has maintained good strength to BUE. Reports pain is controlled. Plan: will need extensive rehab. H&H stable and electrolytes WNL today. Ok to go to ARU from our end. Patient meets criteria for admission to [facility] pending authorization from [employer]. Karyna Guy PA-C  
12/15/2019

## 2019-12-15 NOTE — ROUTINE PROCESS
Patient is alert and oriented times three with no signs or symptoms of distress. Neuro is weakk in legs sensation present.

## 2019-12-15 NOTE — ROUTINE PROCESS
Patient is alert and oriented times three with no signs or symptoms of distress. Neuro remains the same dressing is clean dry and intact

## 2019-12-15 NOTE — ROUTINE PROCESS
End of Shift Note Bedside and verbal shift change report given to Fredy (On coming nurse) by Tien Toney (Off going nurse). Report included the following information:  
   --Procedure Summary 
   --MAR, 
   --Recent Results --Med Rec Status SBAR Recommendations: none Issues for Provider to address none Activity This Shift 
 
 [] Bed Rest Order 
 [] Refused 
 [x] Dangled  
 [] TDWB Ambulating: 
   [] Bathroom [] BSC [] Room/Hallway Up in Chair for meals []Yes [x] No  
Voiding       [x] Yes  [] No 
Renae          [] Yes  [x] No 
Incontinent [] Yes  [x] No 
 
DUE TO VOID POUR        [] Yes [x] No 
Purewick    [x] Yes [] No 
New Onset [] Yes [x] No Straight Cath   []Yes  [x] No 
Condom Cath  [] Yes [x] No 
MD Called      [] Yes  [x] No  
Blood Sugars Managed []Yes [] No   
Bowels Moved [x] Yes [] No 
 
Incontinent     [] Yes [x] No Passed Gas [x]Yes [] No 
 
New Onset  []Yes [x] No 
  
 
 MD Called []Yes  [x] No 
  
CHG Bath Done Before Surgery After Surgery  
  
[x] Yes  [] No 
[x] Yes  [] No   
  
Drain Removed [] Yes  [] No [x] N/A Dressing Changed [] Yes   [x] No [] N/A Nausea/Vomiting [] Yes   [x] No    
Ice Packs Changed [] Yes   [] No  [x] N/A Incentive Spirometer  [x] Yes  [] No     
SCD Pumps On Ankle Pumping  [x] Yes   [] No  
  
[x] Yes   [] No    
  
Telemetry Monitoring [] Yes   [x] No   Rhythm

## 2019-12-15 NOTE — PROGRESS NOTES
[facility] Hospitalist Group Progress Note Patient: Kennedy Ronquillo Age: 62 y.o. : 1955 MR#: [id number] SSN: [id number] Date: 12/15/2019 Subjective:   
 
Back pain is present. No chest pain. No shortness of breath or cough other than chronic shortness of breath. She states she uses 3 L of oxygen. No nausea or vomiting. No headaches or dizziness. Objective:  
 
/74 (BP 1 Location: Left arm, BP Patient Position: At rest)   Pulse 67   Temp 98 °F (36.7 °C)   Resp 18   Ht 5' 5\" (1.651 m)   Wt 81 kg (178 lb 9.6 oz)   LMP 2012 (Exact Date)   SpO2 98%   Breastfeeding No   BMI 29.72 kg/m² General:   Awake, follows commands, no acute distress Cardiovascular:  RRR Pulmonary:  Bilateral clear, no wheezing or rails. GI:  +BS, soft, non-tender. Extremities:  No pedal edema Neuro: alert, awake, oriented x3, moving UEs with 5/5 motor strength in UEs, motor strength 4 out of 5 in the left leg and 2-3 out of 5 in the right leg Assessment: 1. Acute Compression fractures T9-T10 s/p T7-T12 laminectomy and fusion on  2. Acute Thoracic Spinal Cord compression and cord edema    
3. Paraparesis secondary to acute compression fractures and spinal cord compression, slow improvement 4. Mechanical fall at home  
5. Type 2 DM last A1c 10.8% in 2017  
6. COPD without exacerbation  
7. Sarcoidosis 8. Chronic respiratory failure on 3L nc at home  
9. Gout without exacerbation 10. Hypothyroidism 11. Hyperlipidemia 12. Past history of cocaine and heroine abuse  13. Leukocytosis due to steroids, resolved 14. Acute Anemia  due to blood loss from surgery, stable 15. Thrombocytopenia, better Plan Continue current management We will give Venofer x1 and monitor H&H 
PT and OT recommendation noted and acute rehab placement is pending Case discussed with:  [x]Patient  []Family  []Nursing  []Case Management DVT Prophylaxis:  []Lovenox  []Hep SQ  [x]SCDs  []Coumadin   []On Heparin gtt Labs:   
Recent Results (from the past 24 hour(s)) GLUCOSE, POC Collection Time: 12/14/19 11:40 AM  
Result Value Ref Range Glucose (POC) 182 (H) 70 - 110 mg/dL GLUCOSE, POC Collection Time: 12/14/19  4:42 PM  
Result Value Ref Range Glucose (POC) 194 (H) 70 - 110 mg/dL GLUCOSE, POC Collection Time: 12/14/19 10:12 PM  
Result Value Ref Range Glucose (POC) 254 (H) 70 - 110 mg/dL GLUCOSE, POC Collection Time: 12/15/19  5:40 AM  
Result Value Ref Range Glucose (POC) 111 (H) 70 - 110 mg/dL CBC WITH AUTOMATED DIFF Collection Time: 12/15/19  6:15 AM  
Result Value Ref Range WBC 11.4 4.6 - 13.2 K/uL  
 RBC 2.81 (L) 4.20 - 5.30 M/uL HGB 7.7 (L) 12.0 - 16.0 g/dL HCT 23.2 (L) 35.0 - 45.0 % MCV 82.6 74.0 - 97.0 FL  
 MCH 27.4 24.0 - 34.0 PG  
 MCHC 33.2 31.0 - 37.0 g/dL  
 RDW 15.1 (H) 11.6 - 14.5 % PLATELET 953 087 - 516 K/uL MPV 9.6 9.2 - 11.8 FL  
 NEUTROPHILS 68 40 - 73 % LYMPHOCYTES 25 21 - 52 % MONOCYTES 7 3 - 10 % EOSINOPHILS 0 0 - 5 % BASOPHILS 0 0 - 2 %  
 ABS. NEUTROPHILS 7.7 1.8 - 8.0 K/UL  
 ABS. LYMPHOCYTES 2.9 0.9 - 3.6 K/UL  
 ABS. MONOCYTES 0.8 0.05 - 1.2 K/UL  
 ABS. EOSINOPHILS 0.0 0.0 - 0.4 K/UL  
 ABS. BASOPHILS 0.0 0.0 - 0.1 K/UL  
 DF AUTOMATED Signed By: Larry Caballero MD   
 December 15, 2019

## 2019-12-16 PROBLEM — Z98.1 STATUS POST LAMINECTOMY WITH SPINAL FUSION: Status: ACTIVE | Noted: 2019-01-01

## 2019-12-16 PROBLEM — Z78.9 IMPAIRED MOBILITY AND ADLS: Status: ACTIVE | Noted: 2019-01-01

## 2019-12-16 PROBLEM — Z74.09 IMPAIRED MOBILITY AND ADLS: Status: ACTIVE | Noted: 2019-01-01

## 2019-12-16 PROBLEM — L03.113 CELLULITIS OF RIGHT FOREARM: Status: ACTIVE | Noted: 2017-05-04

## 2019-12-16 PROBLEM — D62 ACUTE BLOOD LOSS AS CAUSE OF POSTOPERATIVE ANEMIA: Status: ACTIVE | Noted: 2019-01-01

## 2019-12-16 PROBLEM — Z87.81 HISTORY OF FRACTURE DUE TO FALL: Status: ACTIVE | Noted: 2019-01-01

## 2019-12-16 NOTE — ROUTINE PROCESS
Patient is alert and oriented times three with no signs or symptoms of distress at this time. Neuro is weak in the legs. Dressing is clean dry and intact.

## 2019-12-16 NOTE — PROGRESS NOTES
Discharge planning Discharge order noted for today. Patient has been accepted to ARU. Confirmed with Jay Arreola that bed is available today. Met with patient  agreeable to the transition plan today. [employer] authorization has been obtained. ** Transport to facility has been arranged with staff at 7:30 pm. Patient's discharge summary has been forwarded to skilled nursing facility via epic. Bedside RN, Keshav Montgomery has been updated to the transition plan. Please call report to ext [phone] and Room 190  
 
K. BRIANA SaabN, RN Pager # [phone] Care Manager

## 2019-12-16 NOTE — DIABETES MGMT
GLYCEMIC CONTROL:  
 
Received consult for hosp blood glucose management. Patient is currently out of room - having CT scan. Plan to see patient later today. Carlos Manuel Pack RN [facility] Pager: [phone]

## 2019-12-16 NOTE — PROGRESS NOTES
Problem: Non-Violent Restraints Goal: *Removal from restraints as soon as assessed to be safe Outcome: Progressing Towards Goal 
Goal: *No harm/injury to patient while restraints in use Outcome: Progressing Towards Goal 
Goal: *Patient's dignity will be maintained Outcome: Progressing Towards Goal 
Goal: *Patient Specific Goal (EDIT GOAL, INSERT TEXT) Outcome: Progressing Towards Goal 
Goal: Non-violent Restaints:Standard Interventions Outcome: Progressing Towards Goal 
Goal: Non-violent Restraints:Patient Interventions Outcome: Progressing Towards Goal 
Goal: Patient/Family Education Outcome: Progressing Towards Goal 
  
Problem: Patient Education: Go to Patient Education Activity Goal: Patient/Family Education Outcome: Progressing Towards Goal 
  
Problem: Falls - Risk of 
Goal: *Absence of Falls Description Document Kat Dear Fall Risk and appropriate interventions in the flowsheet. Outcome: Progressing Towards Goal 
Note: Fall Risk Interventions: 
Mobility Interventions: Assess mobility with egress test, Communicate number of staff needed for ambulation/transfer, OT consult for ADLs, Patient to call before getting OOB, PT Consult for mobility concerns Medication Interventions: Assess postural VS orthostatic hypotension, Evaluate medications/consider consulting pharmacy, Patient to call before getting OOB, Teach patient to arise slowly Elimination Interventions: Call light in reach, Patient to call for help with toileting needs, Stay With Me (per policy), Toilet paper/wipes in reach, Toileting schedule/hourly rounds History of Falls Interventions: Consult care management for discharge planning, Evaluate medications/consider consulting pharmacy, Room close to nurse's station, Utilize gait belt for transfer/ambulation Problem: Patient Education: Go to Patient Education Activity Goal: Patient/Family Education Outcome: Progressing Towards Goal 
  
 Problem: Pressure Injury - Risk of 
Goal: *Prevention of pressure injury Description Document Florian Scale and appropriate interventions in the flowsheet. Outcome: Progressing Towards Goal 
Note: Pressure Injury Interventions: 
Sensory Interventions: Assess changes in LOC, Avoid rigorous massage over bony prominences, Check visual cues for pain, Discuss PT/OT consult with provider, Keep linens dry and wrinkle-free Moisture Interventions: Absorbent underpads, Assess need for specialty bed, Check for incontinence Q2 hours and as needed, Internal/External urinary devices Activity Interventions: Assess need for specialty bed, Increase time out of bed, Pressure redistribution bed/mattress(bed type), PT/OT evaluation Mobility Interventions: Assess need for specialty bed, HOB 30 degrees or less, Pressure redistribution bed/mattress (bed type) Nutrition Interventions: Document food/fluid/supplement intake, Offer support with meals,snacks and hydration Friction and Shear Interventions: Apply protective barrier, creams and emollients, Foam dressings/transparent film/skin sealants, HOB 30 degrees or less, Lift team/patient mobility team 
 
  
 
 
 
  
Problem: Patient Education: Go to Patient Education Activity Goal: Patient/Family Education Outcome: Progressing Towards Goal 
  
Problem: Diabetes Self-Management Goal: *Disease process and treatment process Description Define diabetes and identify own type of diabetes; list 3 options for treating diabetes. Outcome: Progressing Towards Goal 
Goal: *Incorporating nutritional management into lifestyle Description Describe effect of type, amount and timing of food on blood glucose; list 3 methods for planning meals. Outcome: Progressing Towards Goal 
Goal: *Incorporating physical activity into lifestyle Description State effect of exercise on blood glucose levels.  
Outcome: Progressing Towards Goal 
 Goal: *Developing strategies to promote health/change behavior Description Define the ABC's of diabetes; identify appropriate screenings, schedule and personal plan for screenings. Outcome: Progressing Towards Goal 
Goal: *Using medications safely Description State effect of diabetes medications on diabetes; name diabetes medication taking, action and side effects. Outcome: Progressing Towards Goal 
Goal: *Monitoring blood glucose, interpreting and using results Description Identify recommended blood glucose targets  and personal targets. Outcome: Progressing Towards Goal 
Goal: *Prevention, detection, treatment of acute complications Description List symptoms of hyper- and hypoglycemia; describe how to treat low blood sugar and actions for lowering  high blood glucose level. Outcome: Progressing Towards Goal 
Goal: *Prevention, detection and treatment of chronic complications Description Define the natural course of diabetes and describe the relationship of blood glucose levels to long term complications of diabetes. Outcome: Progressing Towards Goal 
Goal: *Developing strategies to address psychosocial issues Description Describe feelings about living with diabetes; identify support needed and support network Outcome: Progressing Towards Goal 
Goal: *Insulin pump training Outcome: Progressing Towards Goal 
Goal: *Sick day guidelines Outcome: Progressing Towards Goal 
Goal: *Patient Specific Goal (EDIT GOAL, INSERT TEXT) Outcome: Progressing Towards Goal 
  
Problem: Patient Education: Go to Patient Education Activity Goal: Patient/Family Education Outcome: Progressing Towards Goal 
  
Problem: Patient Education: Go to Patient Education Activity Goal: Patient/Family Education Outcome: Progressing Towards Goal 
  
Problem: Diabetes Maintenance:Admission Goal: *Blood glucose 80 to 180 mg/dl Outcome: Progressing Towards Goal

## 2019-12-16 NOTE — ROUTINE PROCESS
Patient requested to just sit on the bedpan because she is constantly oozing ( referring to having bowel movements) made patient aware the she can't continue to sit on a bed pan, educated patient on letting staff know when she needs to toilet

## 2019-12-16 NOTE — PROGRESS NOTES
Stephen RODRIG[facility] post spine rounding. Patient educated: Activity: OOB for all meals, walk every hour to prevent blood clots Follow back precautions (no bending, twisting, lifting &  Log roll in/out of bed). VTE prophylaxis:  
Use SCD pumps except when walking. Ankle pumps 10 times an hour at hospital & home. Pain Control: 
Pain medications side effects discussed. Wean off narcotics ASAP. Use Tylenol ( 3000 mg/24 hours) , ice, distraction, moving, & change position to help with pain. Don't get nauseated. Eat a snack before taking pain medication Do not get constipated: take stool softener/mild laxative daily while on narcotics. Incentive Spirometry:   
Use of incentive spirometer 10 x/hr. Demonstration  750 ml x 3 Wound Care: Dressing unable to visualize with jonathannet on her back. Educated on how to manage their dressing and/or incision once at home per their MD protocol. Keep dressing and or/incision clean and dry per MD instructions. No lotions, powders, creams to surgical site. Diet:  
Eat for healing. Protein heals bone/muscle. Drink 8 glasses of water a day. Patient Safety:  
Call light & belongings in reach. Call for help when want to walk or get OOB. Educational material given. Patient agreed to continue doing everything at home to prevent complications and have a successful recovery. Patient verbalizing the importance of using the incentive spirometer, ankle pumping, walking frequently, how to manage their wound, taking medication to prevent constipation and following their back precautions. Patient  verbalized understand. Given the opportunity for asking questions.

## 2019-12-16 NOTE — ROUTINE PROCESS
Bedside and Verbal shift change report given to Monica Lezama, [address] [place] [address] (oncoming nurse) by Robert Frias RN (offgoing nurse). Report included the following information SBAR, Melinda and MAR.

## 2019-12-16 NOTE — ROUTINE PROCESS
Bedside and Verbal shift change report given to Arturo Miranda 45 (oncoming nurse) by Varun Kc RN (offgoing nurse). Report included the following information SBAR, Kardex, ED Summary, OR Summary, Procedure Summary, Intake/Output, MAR and Recent Results.

## 2019-12-16 NOTE — PROGRESS NOTES
Problem: Mobility Impaired (Adult and Pediatric) Goal: *Acute Goals and Plan of Care (Insert Text) Description Physical Therapy Goals Initiated 12/11/2019 and to be accomplished within 7 day(s) 1. Patient will roll side to side in bed with moderate assistance . 2.  Patient will move from supine to sit and sit to supine  in bed with maximal assistance. 3.  Patient will transfer from bed to chair and chair to bed with maximal assistance using the least restrictive device AND/OR SLIDING BOARD. 4.  Patient will perform sit to stand with maximal assistance. 5.  Patient will increase sitting balance static good and dynamic fair to increase safety with transfers. Prior Level of Function:  
Patient was modified independence for all mobility including gait using single point cane. Pt was having increased weakness with multiple falls then decreased ability to move legs causing admission to hospital and surgery. Patient lives alone in a single story home 4 steps to enter B handrail assist. Pt has aides split shifts 8 hours per day 7 days a week. Outcome: Progressing Towards Goal 
  
PHYSICAL THERAPY TREATMENT Patient: Gerda Mclean (65 y.o. female) Date: 12/16/2019 Diagnosis: Hyponatremia [E87.1] Spinal cord compression ([address] [G95.20] Hyponatremia [E87.1] Hypokalemia [E87.6] Spinal cord compression ([address] [G95.20] Compression fracture of body of thoracic vertebra ([address] [S22.000A] Spinal cord compression ([address] [G95.20] Hypokalemia [E87.6] <principal problem not specified> Procedure(s) (LRB): SPINE THORACIC LAMINECTOMY T6 - T 11 WITH FUSION; K2M (N/A) 5 Days Post-Op Precautions: Spinal, Fall, Skin ASSESSMENT: 
Pt found seated in recliner with nursing staff exiting room post transfer willing to participate w/ therapy.  Pt voiced good recall of precautions prior to therapy w/ ability to maintain t/o, written on board per request for reminder. Pt cont to display (B) LE weakness req (B) facilitation for both during sit to stand transfers. Pt initially a little self limiting, however w/ education, pt able to make functional gains. Upon standing on first attempt, discovered BM on pad. When standing, pt displays buckling of (B) LEs req max A X2. Bed pan positioned under pt in recliner as BSC transfer unsafe at this time. Pt stood post BM (increased time) and appreciate assistance of Sharon manzo. Pt returned to sitting post ability to stand for approx 30' at a max A X2 level. Pt returned to recliner, provided all needs, and left in chair. Nurse notified of BM and recommendations for lift team to return back to bed when ready. Pt educated on importance of maintaining precautions, importance of constant movement in (B) LEs, and importance of OOB to chair. Progression toward goals: good [x]      Improving appropriately and progressing toward goals 
[]      Improving slowly and progressing toward goals 
[]      Not making progress toward goals and plan of care will be adjusted PLAN: 
Patient continues to benefit from skilled intervention to address the above impairments. Continue treatment per established plan of care. Discharge Recommendations:  Rehab Further Equipment Recommendations for Discharge:  bedside commode, rolling walker, and wheelchair 22 inch SUBJECTIVE:  
Patient stated I was able to do everything before all of this happened.  OBJECTIVE DATA SUMMARY:  
Critical Behavior: 
Neurologic State: Alert Orientation Level: Oriented X4 Cognition: Appropriate for age attention/concentration Safety/Judgement: Fall prevention Functional Mobility Training: 
Transfers: 
Sit to Stand: Maximum assistance;Assist x2 Stand to Sit: Maximum assistance;Assist x2 Balance: 
Sitting: Intact Standing: Impaired; With support Standing - Static: Poor Pain: 
Did not voice number, voiced elevated pain in surgical area Activity Tolerance:  
Good Please refer to the flowsheet for vital signs taken during this treatment. After treatment:  
[x] Patient left in no apparent distress sitting up in chair 
[] Patient left in no apparent distress in bed 
[x] Call bell left within reach [x] Nursing notified 
[] Caregiver present 
[] Bed alarm activated 
[] SCDs applied COMMUNICATION/EDUCATION:  
[x]         Role of Physical Therapy in the acute care setting. [x]         Fall prevention education was provided and the patient/caregiver indicated understanding. [x]         Patient/family have participated as able in working toward goals and plan of care. [x]         Patient/family agree to work toward stated goals and plan of care. []         Patient understands intent and goals of therapy, but is neutral about his/her participation. []         Patient is unable to participate in stated goals/plan of care: ongoing with therapy staff. 
[]         Other: 
 
   
Claude Plane, PTA Time Calculation: 38 mins

## 2019-12-16 NOTE — DISCHARGE SUMMARY
Physician Discharge Summary Patient: Luiz Quiles MRN: [id number]  SSN: [id number] YOB: 1955  Age: 62 y.o. Sex: female PCP: Purnima Miranda NP Allergies: Moxifloxacin; Pcn [penicillins]; and Sulfa (sulfonamide antibiotics) Admit date: 12/10/2019 Admitting Provider: Woody Hernandes MD 
 
Discharge date: 12/16/2019 Discharging Provider: Abi Ordaz MD 
 
* Admission Diagnoses: Hyponatremia [E87.1] Spinal cord compression ([address] [G95.20] Hyponatremia [E87.1] Hypokalemia [E87.6] Spinal cord compression ([address] [G95.20] Compression fracture of body of thoracic vertebra ([address] [S22.000A] Spinal cord compression ([address] [G95.20] Hypokalemia [E87.6] * Discharge Diagnoses: 1. NumberAcute Compression fractures T9-T10 s/p T7-T12 laminectomy and fusion on 12/11 2. Acute Thoracic Spinal Cord compression and cord edema    
3. Paraparesis secondary to acute compression fractures and spinal cord compression, slow improvement 4. Mechanical fall at home  
5. Type 2 DM last A1c 10.8% in 4/2017  
6. COPD without exacerbation  
7. Sarcoidosis  
8. Chronic respiratory failure on 3L nc at home  
9. Gout without exacerbation 10. Hypothyroidism 11. Hyperlipidemia 12. Past history of cocaine and heroine abuse  13. Leukocytosis due to steroids, resolved 14. Acute Anemia  due to blood loss from surgery, stable 15. Thrombocytopenia, better [facility] Course: The patient presented to the hospital on December 11, 2019 with complaint of frequent falls and inability to walk. X-ray of L-spine was done which was negative for any acute findings as patient was complaining of having back pain. MRI of lumbar spine was reported to have degenerative changes at L3-S1. CT scan of the head was done and it was reported unremarkable. MRI of C-spine and thoracic spine were done.   MRI reported patient is having findings suggestive of acute compression fracture at T9 and T10. Spine surgery was consulted as patient also had paraparesis and acute thoracic spinal cord compression. Initially patient was given steroid IV. Patient underwent T7-T12 laminectomy and fusion on December 11, 2019. Patient was also seen by pulmonologist for her chronic COPD and chronic hypoxic respiratory failure. Patient had acute anemia due to blood loss from surgery subsequently. Patient did not require any blood transfusion as her hemoglobin remained stable more than 7. She will be continued on p.o. ferrous sulfate. Patient also has history of cocaine and heroin abuse. She was active with methadone clinic and will be resumed on methadone. Patient was continue home medication for the comorbidities. PT and OT evaluated this patient and recommended acute rehab placement. Patient is medically stable to be transferred to acute rehab unit. * Procedures:  
Procedure(s): SPINE THORACIC LAMINECTOMY T6 - T 11 WITH FUSION; K2M Consults: Pulmonary/Intensive care and Orthopedic Surgery Significant Diagnostic Studies: 
 
Chest x-ray CT scan of the head X-ray of L-spine MRI of L-spine MRI of C-spine MRI of L-spine Repeat chest x-ray CT of thoracic spine Echocardiogram 
 
Discharge Exam: 
Please read my progress note from December 16, 2019 * Discharge Condition: improved * Disposition: Acute rehabilitation unit Discharge Medications: 
Current Discharge Medication List  
  
START taking these medications Details  
ferrous sulfate 325 mg (65 mg iron) tablet Take 1 Tab by mouth daily (with breakfast). Qty: 15 Tab, Refills: 0  
  
multivitamin, tx-iron-ca-min (THERA-M W/ IRON) 9 mg iron-400 mcg tab tablet Take 1 Tab by mouth daily. Qty: 30 Tab, Refills: 0  
  
nicotine (NICODERM CQ) 14 mg/24 hr patch 1 Patch by TransDERmal route daily for 30 days. Qty: 30 Patch, Refills: 0  
  
senna-docusate (PERICOLACE) 8.6-50 mg per tablet Take 1 Tab by mouth daily. Qty: 10 Tab, Refills: 0  
  
oxyCODONE-acetaminophen (PERCOCET) 5-325 mg per tablet Take 1-2 Tabs by mouth every six (6) hours as needed for Pain for up to 3 days. Max Daily Amount: 8 Tabs. Qty: 10 Tab, Refills: 0 Associated Diagnoses: Closed fracture of thoracic vertebra, unspecified fracture morphology, unspecified thoracic vertebral level, initial encounter ([facility] methadone (DOLOPHINE) 5 mg tablet Take 4 Tabs by mouth daily. Max Daily Amount: 20 mg. 
Qty: 10 Tab, Refills: 0 Associated Diagnoses: History of back injury CONTINUE these medications which have CHANGED Details  
predniSONE (DELTASONE) 10 mg tablet Take 30 mg by mouth daily (with breakfast). Indications: sarcoidosis Qty: 10 Tab, Refills: 0  
  
aspirin 81 mg chewable tablet Take 1 Tab by mouth daily. Qty: 30 Tab, Refills: 0  
  
insulin glargine (LANTUS) 100 unit/mL injection 20 Units by SubCUTAneous route daily. Indications: type 2 diabetes mellitus Qty: 1 Vial, Refills: 0 Associated Diagnoses: Type 2 diabetes mellitus with stage 3 chronic kidney disease, with long-term current use of insulin ([facility] CONTINUE these medications which have NOT CHANGED Details  
acetaminophen (TYLENOL) 325 mg tablet Take  by mouth every four (4) hours as needed for Pain. !! famotidine (PEPCID) 20 mg tablet Take 1 Tab by mouth daily. Indications: Stress Ulcer Prevention 
Qty: 30 Tab, Refills: 1 Associated Diagnoses: Gastroesophageal reflux disease with hiatal hernia  
  
rosuvastatin (CRESTOR) 5 mg tablet TAKE ONE TABLET NIGHTLY Qty: 90 Tab, Refills: 3  
  
albuterol (PROAIR HFA) 90 mcg/actuation inhaler INHALE 2 PUFFS EVERY 4 HOURS AS NEEDED FOR WHEEZING Qty: 1 Inhaler, Refills: 5  
  
albuterol (PROVENTIL VENTOLIN) 2.5 mg /3 mL (0.083 %) nebulizer solution USE 1 NEB EVERY 4 HOURS FOR WHEEZING AND SHORTNESS OF BREATH Qty: 2 Package, Refills: 3 Associated Diagnoses: COPD, severe ([facility] insulin lispro (HUMALOG) 100 unit/mL injection To be given 15 min before meals: < 150 - None, 151-200 - 2 u, 201-250 - 4 u, 251-300 - 6 u, 301-350 - 8 u, 351-400 - 10 u, >400 - 12 u Qty: 1 Vial, Refills: 0 Associated Diagnoses: Type 2 diabetes mellitus with stage 3 chronic kidney disease, with long-term current use of insulin ([address] levothyroxine (SYNTHROID) 100 mcg tablet Take 1 Tab by mouth Daily (before breakfast). Indications: hypothyroidism 
Qty: 15 Tab, Refills: 0 Associated Diagnoses: Acquired hypothyroidism  
  
cholecalciferol (VITAMIN D3) 1,000 unit tablet Take 2 Tabs by mouth daily. Indications: PREVENTION OF VITAMIN D DEFICIENCY Qty: 30 Tab, Refills: 0 Associated Diagnoses: History of vitamin D deficiency  
  
divalproex DR (DEPAKOTE) 250 mg tablet Take 250 mg by mouth nightly. Indications: BIPOLAR DISORDER IN REMISSION  
  
insulin syringe,safetyneedle 1 mL 30 gauge x 5/16\" syrg As directed Qty: 50 Each, Refills: 0  
  
polyethylene glycol (MIRALAX) 17 gram/dose powder Take 17 g by mouth daily. 1 tablespoon with 8 oz of water daily 
Qty: 289 g, Refills: 0  
  
docusate sodium (COLACE) 100 mg capsule Take 1 Cap by mouth two (2) times a day. Qty: 30 Cap, Refills: 0  
  
!! famotidine (PEPCID) 20 mg tablet TAKE 1 TABLET BY MOUTH ONCE DAILY Qty: 15 Tab, Refills: 0 Associated Diagnoses: Gastroesophageal reflux disease with hiatal hernia  
  
umeclidinium-vilanterol (ANORO ELLIPTA) 62.5-25 mcg/actuation inhaler Take 1 Puff by inhalation daily. Qty: 1 Inhaler, Refills: 0 BD INSULIN SYRINGE ULTRA-FINE 1 mL 31 gauge x 5/16 syrg   
  
promethazine (PHENERGAN) 25 mg tablet Take 1 Tab by mouth every six (6) hours as needed for Nausea (and withdrawl symptoms). Qty: 15 Tab, Refills: 0  
  
oxybutynin (DITROPAN) 5 mg tablet Take 5 mg by mouth two (2) times a day. allopurinol (ZYLOPRIM) 100 mg tablet Take 100 mg by mouth daily. ARIPiprazole (ABILIFY) 5 mg tablet Take 10 mg by mouth daily. Indications: BIPOLAR DISORDER IN REMISSION Comments: take 2 tabs each morning  
  
traZODone (DESYREL) 100 mg tablet Take 100 mg by mouth as needed. Indications: major depressive disorder Nebulizer Accessories Kit Use with nebulizer machine Qty: 1 Kit, Refills: prn  
 Associated Diagnoses: COPD (chronic obstructive pulmonary disease) ([facility])  
  
sertraline (ZOLOFT) 100 mg tablet Take 50 mg by mouth daily. Indications: Bipolar Depression Oxygen-Air Delivery Systems by Nasal route continuous. 2 LPM via NC  Indications: Hypoxemia requiring supplementary oxygen !! - Potential duplicate medications found. Please discuss with provider. STOP taking these medications  
  
 metaxalone (SKELAXIN) 800 mg tablet Comments:  
Reason for Stopping:   
   
 cloNIDine HCl (CATAPRES) 0.1 mg tablet Comments:  
Reason for Stopping:   
   
 metOLazone (ZAROXOLYN) 2.5 mg tablet Comments:  
Reason for Stopping:   
   
 gabapentin (NEURONTIN) 300 mg capsule Comments:  
Reason for Stopping:   
   
  
 
 
* Follow-up Care/Patient Instructions: Activity: PT/OT Eval and Treat Diet: Cardiac Diet and Diabetic Diet Wound Care: None needed Follow-up Information Follow up With Specialties Details Why Contact Info Nelda Escobar NP Nurse Practitioner   [address] [place] [address] 
[phone] Follow-up Appointments Procedures  FOLLOW UP VISIT Appointment in: Other (Specify) Dr. Bettina Driscoll and Dr. Alexis Headley to follow this patient while patient is in acute rehab Dr. Bettina Driscoll and Dr. Alexis Headley to follow this patient while patient is in acute rehab Standing Status:   Standing Number of Occurrences:   1 Order Specific Question:   Appointment in Answer: Other (Specify) Disclaimer: Sections of this note are dictated using utilizing voice recognition software. Minor typographical errors may be present.  If questions arise, please do not hesitate to contact me or call our department.    
 
Signed: 
Abi Ordaz MD 
12/16/2019 
1:00 PM

## 2019-12-16 NOTE — PROGRESS NOTES
Discharge planning Review chart. Writer spoke with Nicholas Ching with [facility] and insurance authorization was started for ARU. CM will continue to assist with transitional needs. FRANK Rain, RN Pager # [phone] Care Manager

## 2019-12-16 NOTE — ROUTINE PROCESS
End of Shift Note Bedside and verbal shift change report given to [address] rn (On coming nurse) by Bascom Apley (Off going nurse). Report included the following information:  
   --Procedure Summary 
   --MAR, 
   --Recent Results --Med Rec Status SBAR Recommendations: none Issues for Provider to address none Activity This Shift 
 
 [] Bed Rest Order 
 [] Refused 
 [x] Dangled  
 [] TDWB Ambulating: 
   [] Bathroom [] BSC [] Room/Hallway Up in Chair for meals []Yes [x] No  
Voiding       [x] Yes  [] No 
Renae          [] Yes  [x] No 
Incontinent [] Yes  [x] No 
 
DUE TO VOID POUR        [] Yes [x] No 
Purewick    [x] Yes [] No 
New Onset [] Yes [x] No Straight Cath   []Yes  [x] No 
Condom Cath  [] Yes [x] No 
MD Called      [] Yes  [x] No  
Blood Sugars Managed []Yes [] No   
Bowels Moved [x] Yes [] No 
 
Incontinent     [] Yes [x] No Passed Gas [x]Yes [] No 
 
New Onset  []Yes [x] No 
  
 
 MD Called []Yes  [x] No 
  
CHG Bath Done Before Surgery After Surgery  
  
[x] Yes  [] No 
[x] Yes  [] No   
  
Drain Removed [] Yes  [] No [x] N/A Dressing Changed [] Yes   [x] No [] N/A Nausea/Vomiting [] Yes   [x] No    
Ice Packs Changed [] Yes   [] No  [x] N/A Incentive Spirometer  [x] Yes  [] No     
SCD Pumps On Ankle Pumping  [x] Yes   [] No  
  
[x] Yes   [] No    
  
Telemetry Monitoring [] Yes   [x] No   Rhythm

## 2019-12-16 NOTE — PROGRESS NOTES
ARU/IPR REFERRAL CONTACT NOTE [id number] [place] [address] for Physical Rehabilitation RE:  Panchito Juliann Thank you for the opportunity to review this patient's case for admission to [id number] [place] [address] for Physical Rehabilitation. Based on our pre-admission screening patient meets criteria for admission to [facility] for Physical Rehabilitation. Plan for admission today to room 190 at 7:30pm.  Will need d/c summary/med rec completed and report called to [phone] prior to patient's arrival. 
 
Again, Thank you for this referral. Should you have any questions please do not hesitate to call. Sincerely, Jeane Merida Admissions  Liaison [facility] Physical Rehabilitation 
[phone]

## 2019-12-16 NOTE — ROUTINE PROCESS
Patient is alert and oriented times three with no signs or symptoms of distress. Dressing is intact and dry and neuro is weak in the legs can not support her own weight

## 2019-12-16 NOTE — PROGRESS NOTES
vss afeb Neuro stable , continued weakness 3/5 BLE Pain controlled Blood sugar labile Wound benign Plan Stable for transfer to ARU from Orthopedic perspective CT for baseline T spine Diabetes service to evaluate glucose management. Remove central line

## 2019-12-16 NOTE — DIABETES MGMT
Glycemic Control Plan of Care 
 
T2DM with current A1c level of 7.1% (12/11/2019). See separate notes, 12/12/2019, for assessment of home diabetes management and education. Patient reported prior A1c level in the 9% and she's planning to cont to follow her diabetes treatment plan to help achieve/maintain recommended A1c level <7%. Home diabetes medications: Patient reported on 12/12/2019: 
Lantus insulin 45 units daily every morning. Lispro sliding scale insulin TID AC. POC BG range on 12/15/2019: 111-302 mg/dL. POC BG report on 12/16/2019 at time of review: 164 mg/dL. Seen patient this morning. Noted pattern of elevated BG later in the day. She's currently on Prednisone 30 mg daily with breakfast. 
 
Noted dcp pending auth and transf to ARU. Recommendation(s): 
1.) Cont monitoring and titrate lantus dose to 25 units scott to keep BG <180. Assessment: 
Patient is 62year old with past medical history including type 2 diabetes mellitus, obesity, GED, mixed connective tissue disease, recurrent genital herpes, sarcoidosis, hypothyroidism, CKD stage 3, COPD, hypertriglyceridemia, bipolar affective disorder, chronic back pain and on Methadone - was admitted on 12/10/2019 with report that she can't move her legs. Noted: 
Pathologic fracture thoracic, paraplegia and cord compression. Status post thoracic laminectomy with fusion and instrumentation of 12/11/2019. T2DM. Most recent blood glucose values: 
 
Results for Fili Schmitt (MRN [id number]) as of 12/16/2019 10:40 Ref. Range 12/15/2019 05:40 12/15/2019 11:29 12/15/2019 14:33 12/15/2019 21:56 GLUCOSE,FAST - POC Latest Ref Range: 70 - 110 mg/dL 111 (H) 169 (H) 215 (H) 302 (H) Results for Fili Schmitt (MRN [id number]) as of 12/16/2019 10:40 Ref. Range 12/16/2019 05:44 GLUCOSE,FAST - POC Latest Ref Range: 70 - 110 mg/dL 164 (H) Current A1C: 7.1% (12/11/2019) which is equivalent to estimated average blood glucose of 157 mg/dL which is equivalent to estimated average blood glucose of 157 mg/dL during the past 2-3 months. Current hospital diabetes medications: 
Basal lantus insulin 20 units daily. Correctional lispro insulin ACHS. Very resistant dose. Total daily dose insulin requirement previous day: 12/15/2019: 
Lantus: 20 units Lispro: 9 units TDD insulin : 33 units Diet: Cardiac regular; consistent carb 1800kcal. 
 
Goals:  Blood glucose will be within target range of  mg/dL by 12/19/2019 Education:  _X__  Refer to Diabetes Education Record: 12/12/2019 
           ___  Education not indicated at this time

## 2019-12-16 NOTE — PROGRESS NOTES
Bedside and Verbal shift change report given to Taye Perez RN (oncoming nurse) by Jamari Hansen RN (offgoing nurse). Report included the following information SBAR and Kardex.

## 2019-12-16 NOTE — DIABETES MGMT
DIABETES EDUCATION: 
 
See assessment of home diabetes management and education notes entered on 12/12/2019. Seen patient this afternoon. Patient requested snack and she was given sugar free pudding then she proceeded to also eat the cami crackers that she had at the bedside. Encouraged patient to follow nutritional recommendations for management of diabetes. Patient stated, \"I have been diabetic for many years and I already know what to do. \" 
 
Bunny Anthony RN [facility] Pager: [phone]

## 2019-12-16 NOTE — PROGRESS NOTES
[facility] Hospitalist Group Progress Note Patient: Saniya Gomez Age: 62 y.o. : 1955 MR#: [id number] SSN: [id number] Date: 2019 Subjective:   
 
Feeling much better. States she was sitting up in chair earlier today. Back pain is getting better. No nausea or vomiting. No shortness of breath or cough. No headaches or dizziness. Objective:  
 
/70 (BP 1 Location: Left arm, BP Patient Position: At rest)   Pulse 80   Temp 98.2 °F (36.8 °C)   Resp 18   Ht 5' 5\" (1.651 m)   Wt 81 kg (178 lb 9.6 oz)   LMP 2012 (Exact Date)   SpO2 100%   Breastfeeding No   BMI 29.72 kg/m² General:   Awake, follows commands, no acute distress Cardiovascular:  RRR Pulmonary:  Bilateral clear, no wheezing or rails. GI:  +BS, soft, non-tender. Extremities:  No pedal edema Neuro: alert, awake,, motor strength 4 out of 5 in the left leg and 2-3 out of 5 in the right leg Assessment: 1. Acute Compression fractures T9-T10 s/p T7-T12 laminectomy and fusion on  2. Acute Thoracic Spinal Cord compression and cord edema    
3. Paraparesis secondary to acute compression fractures and spinal cord compression, slow improvement 4. Mechanical fall at home  
5. Type 2 DM last A1c 10.8% in 2017  
6. COPD without exacerbation  
7. Sarcoidosis 8. Chronic respiratory failure on 3L nc at home  
9. Gout without exacerbation 10. Hypothyroidism 11. Hyperlipidemia 12. Past history of cocaine and heroine abuse  13. Leukocytosis due to steroids, resolved 14. Acute Anemia  due to blood loss from surgery, stable 15. Thrombocytopenia, better Plan Continue current management Stable H&H 
PT and OT recommendation noted and acute rehab placement is pending ARU replacement is pending Patient is medically stable to be discharged to a rehabilitation Case discussed with:  [x]Patient  []Family  []Nursing  []Case Management DVT Prophylaxis:  []Lovenox  []Hep SQ  [x]SCDs  []Coumadin   []On Heparin gtt Labs:   
Recent Results (from the past 24 hour(s)) GLUCOSE, POC Collection Time: 12/15/19  2:33 PM  
Result Value Ref Range Glucose (POC) 215 (H) 70 - 110 mg/dL GLUCOSE, POC Collection Time: 12/15/19  9:56 PM  
Result Value Ref Range Glucose (POC) 302 (H) 70 - 110 mg/dL GLUCOSE, POC Collection Time: 12/16/19  5:44 AM  
Result Value Ref Range Glucose (POC) 164 (H) 70 - 110 mg/dL HGB & HCT Collection Time: 12/16/19  6:05 AM  
Result Value Ref Range HGB 7.7 (L) 12.0 - 16.0 g/dL HCT 23.1 (L) 35.0 - 45.0 % GLUCOSE, POC Collection Time: 12/16/19 11:29 AM  
Result Value Ref Range Glucose (POC) 182 (H) 70 - 110 mg/dL Current Discharge Medication List  
  
START taking these medications Details  
ferrous sulfate 325 mg (65 mg iron) tablet Take 1 Tab by mouth daily (with breakfast). Qty: 15 Tab, Refills: 0  
  
multivitamin, tx-iron-ca-min (THERA-M W/ IRON) 9 mg iron-400 mcg tab tablet Take 1 Tab by mouth daily. Qty: 30 Tab, Refills: 0  
  
nicotine (NICODERM CQ) 14 mg/24 hr patch 1 Patch by TransDERmal route daily for 30 days. Qty: 30 Patch, Refills: 0  
  
senna-docusate (PERICOLACE) 8.6-50 mg per tablet Take 1 Tab by mouth daily. Qty: 10 Tab, Refills: 0  
  
oxyCODONE-acetaminophen (PERCOCET) 5-325 mg per tablet Take 1-2 Tabs by mouth every six (6) hours as needed for Pain for up to 3 days. Max Daily Amount: 8 Tabs. Qty: 10 Tab, Refills: 0 Associated Diagnoses: Closed fracture of thoracic vertebra, unspecified fracture morphology, unspecified thoracic vertebral level, initial encounter ([facility] methadone (DOLOPHINE) 5 mg tablet Take 4 Tabs by mouth daily. Max Daily Amount: 20 mg. 
Qty: 10 Tab, Refills: 0 Associated Diagnoses: History of back injury CONTINUE these medications which have CHANGED Details predniSONE (DELTASONE) 10 mg tablet Take 30 mg by mouth daily (with breakfast). Indications: sarcoidosis Qty: 10 Tab, Refills: 0  
  
aspirin 81 mg chewable tablet Take 1 Tab by mouth daily. Qty: 30 Tab, Refills: 0  
  
insulin glargine (LANTUS) 100 unit/mL injection 20 Units by SubCUTAneous route daily. Indications: type 2 diabetes mellitus Qty: 1 Vial, Refills: 0 Associated Diagnoses: Type 2 diabetes mellitus with stage 3 chronic kidney disease, with long-term current use of insulin ([address] CONTINUE these medications which have NOT CHANGED Details  
acetaminophen (TYLENOL) 325 mg tablet Take  by mouth every four (4) hours as needed for Pain. !! famotidine (PEPCID) 20 mg tablet Take 1 Tab by mouth daily. Indications: Stress Ulcer Prevention 
Qty: 30 Tab, Refills: 1 Associated Diagnoses: Gastroesophageal reflux disease with hiatal hernia  
  
rosuvastatin (CRESTOR) 5 mg tablet TAKE ONE TABLET NIGHTLY Qty: 90 Tab, Refills: 3  
  
albuterol (PROAIR HFA) 90 mcg/actuation inhaler INHALE 2 PUFFS EVERY 4 HOURS AS NEEDED FOR WHEEZING Qty: 1 Inhaler, Refills: 5  
  
albuterol (PROVENTIL VENTOLIN) 2.5 mg /3 mL (0.083 %) nebulizer solution USE 1 NEB EVERY 4 HOURS FOR WHEEZING AND SHORTNESS OF BREATH Qty: 2 Package, Refills: 3 Associated Diagnoses: COPD, severe (HCC)  
  
insulin lispro (HUMALOG) 100 unit/mL injection To be given 15 min before meals: < 150 - None, 151-200 - 2 u, 201-250 - 4 u, 251-300 - 6 u, 301-350 - 8 u, 351-400 - 10 u, >400 - 12 u Qty: 1 Vial, Refills: 0 Associated Diagnoses: Type 2 diabetes mellitus with stage 3 chronic kidney disease, with long-term current use of insulin ([address] levothyroxine (SYNTHROID) 100 mcg tablet Take 1 Tab by mouth Daily (before breakfast). Indications: hypothyroidism 
Qty: 15 Tab, Refills: 0 Associated Diagnoses: Acquired hypothyroidism  
  
cholecalciferol (VITAMIN D3) 1,000 unit tablet Take 2 Tabs by mouth daily. Indications: PREVENTION OF VITAMIN D DEFICIENCY Qty: 30 Tab, Refills: 0 Associated Diagnoses: History of vitamin D deficiency  
  
divalproex DR (DEPAKOTE) 250 mg tablet Take 250 mg by mouth nightly. Indications: BIPOLAR DISORDER IN REMISSION  
  
insulin syringe,safetyneedle 1 mL 30 gauge x 5/16\" syrg As directed Qty: 50 Each, Refills: 0  
  
polyethylene glycol (MIRALAX) 17 gram/dose powder Take 17 g by mouth daily. 1 tablespoon with 8 oz of water daily 
Qty: 289 g, Refills: 0  
  
docusate sodium (COLACE) 100 mg capsule Take 1 Cap by mouth two (2) times a day. Qty: 30 Cap, Refills: 0  
  
!! famotidine (PEPCID) 20 mg tablet TAKE 1 TABLET BY MOUTH ONCE DAILY Qty: 15 Tab, Refills: 0 Associated Diagnoses: Gastroesophageal reflux disease with hiatal hernia  
  
umeclidinium-vilanterol (ANORO ELLIPTA) 62.5-25 mcg/actuation inhaler Take 1 Puff by inhalation daily. Qty: 1 Inhaler, Refills: 0 BD INSULIN SYRINGE ULTRA-FINE 1 mL 31 gauge x 5/16 syrg   
  
promethazine (PHENERGAN) 25 mg tablet Take 1 Tab by mouth every six (6) hours as needed for Nausea (and withdrawl symptoms). Qty: 15 Tab, Refills: 0  
  
oxybutynin (DITROPAN) 5 mg tablet Take 5 mg by mouth two (2) times a day. allopurinol (ZYLOPRIM) 100 mg tablet Take 100 mg by mouth daily. ARIPiprazole (ABILIFY) 5 mg tablet Take 10 mg by mouth daily. Indications: BIPOLAR DISORDER IN REMISSION Comments: take 2 tabs each morning  
  
traZODone (DESYREL) 100 mg tablet Take 100 mg by mouth as needed. Indications: major depressive disorder Nebulizer Accessories Kit Use with nebulizer machine Qty: 1 Kit, Refills: prn  
 Associated Diagnoses: COPD (chronic obstructive pulmonary disease) ([facility])  
  
sertraline (ZOLOFT) 100 mg tablet Take 50 mg by mouth daily. Indications: Bipolar Depression Oxygen-Air Delivery Systems by Nasal route continuous. 2 LPM via NC  Indications: Hypoxemia requiring supplementary oxygen !! - Potential duplicate medications found. Please discuss with provider. STOP taking these medications  
  
 metaxalone (SKELAXIN) 800 mg tablet Comments:  
Reason for Stopping:   
   
 cloNIDine HCl (CATAPRES) 0.1 mg tablet Comments:  
Reason for Stopping:   
   
 metOLazone (ZAROXOLYN) 2.5 mg tablet Comments:  
Reason for Stopping:   
   
 gabapentin (NEURONTIN) 300 mg capsule Comments:  
Reason for Stopping:   
   
  
 
 
Signed By: Tonio Esposito MD   
 December 16, 2019

## 2019-12-16 NOTE — ROUTINE PROCESS
Patient is alert and oriented times three with no signs or symptoms of distress. Dressing is clean dry and intact and neuro remains the same

## 2019-12-17 NOTE — PROGRESS NOTES
SHIFT CHANGE NOTE FOR [place] Bedside and Verbal shift change report given to Cristopher Auto  (oncoming nurse) by Escobar Evans LPN (offgoing nurse). Report included the following information SBAR, Kardex, MAR and Recent Results. Situation: 
 Code Status: Full Code Reason for Admission: Spinal Thoracic laminectomy T 6-T 11 Hospital Day: 1 Problem List:  
Hospital Problems  Date Reviewed: 11/12/2019 Codes Class Noted POA Chronic respiratory failure with hypoxia (HCC) (Chronic) ICD-10-CM: J96.11 
ICD-9-CM: 518.83, 799.02  Unknown Yes Overview Signed 12/16/2019 10:11 PM by Blas Zee MD  
  On home oxygen inhalation at 3 LPM via NC Opioid dependence (HCC) (Chronic) ICD-10-CM: R41.54 ICD-9-CM: 304.00  Unknown Yes Overview Signed 12/16/2019 10:54 PM by Blas Zee MD  
  On Methadone Acute blood loss as cause of postoperative anemia ICD-10-CM: D62 
ICD-9-CM: 285.1  12/12/2019 Yes Impaired mobility and ADLs ICD-10-CM: Z74.09 
ICD-9-CM: 799.89  12/11/2019 Yes Spinal cord compression (HCC) ICD-10-CM: G95.20 ICD-9-CM: 336.9  12/11/2019 Yes Compression fracture of body of thoracic vertebra (HCC) ICD-10-CM: S22.000A ICD-9-CM: 805.2  12/11/2019 Yes * (Principal) Status post laminectomy with spinal fusion ICD-10-CM: Z98.1 ICD-9-CM: V45.4  12/11/2019 Yes Overview Signed 12/16/2019 10:05 PM by Blas Zee MD  
  S/P T10, T9, T8 laminectomy; T7, T8, T9, T10, T11, T12 posterior thoracic fusion; segmental spinal instrumentation; K2M [place] type, T7-T8, T11-T12 (12/11/2019 - Dr. Vinny Penn) History of fracture due to fall ICD-10-CM: Z87.81 ICD-9-CM: V15.51  12/11/2019 Yes Hypoxemia requiring supplemental oxygen (Chronic) ICD-10-CM: R09.02, Z99.81 ICD-9-CM: 799.02  12/29/2014 Yes Chronic obstructive pulmonary disease (COPD) (HCC) (Chronic) ICD-10-CM: J44.9 ICD-9-CM: 895  Unknown Yes Overview Signed 4/23/2013 10:01 AM by Gurpreet CALLES  
  SOB, on paula O2 Recent admission with psychosis 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 congestive heart failure (HCC) (Chronic) ICD-10-CM: I13.10, N18.3 ICD-9-CM: 404.10, 585.3  Unknown Yes Overview Signed 4/23/2013 10:02 AM by Jazlyn Pimentel Better controlled Type 2 diabetes with stage 3 chronic kidney disease GFR 30-59 (HCC) (Chronic) ICD-10-CM: E11.22, N18.3 ICD-9-CM: 250.40, 585.3  Unknown Yes Background: 
 Past Medical History:  
Past Medical History:  
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  
 CKD stage G3a/A1, GFR 45-59 and albumin creatinine ratio <30 mg/g ([facility]) 5/11/2017 Urine microalbumin/Creatinine ratio (5/11/2017) = 9 mg/g  Contusion of left elbow 5/4/2017  Depression  Diabetic neuropathy associated with type 2 diabetes mellitus ([address]  Diastolic dysfunction without heart failure Stable on diuretics  Falls frequently  Gastroesophageal reflux disease with hiatal hernia  Generalized osteoarthritis of multiple sites  Gout On Allopurinol  History of acute renal failure 5/31/2013  History of back injury   
 jumped out of second story window  History of fracture due to fall 12/11/2019  
 History of hepatitis C   
 treated  History of penicillin allergy  History of vitamin D deficiency 5/10/2017  Hyperuricemia 5/26/2017  Hypothyroidism  Hypoxemia requiring supplemental oxygen 12/29/2014  Intravenous drug user 5/2/2017  Long term current use of aspirin  Memory difficulty  Menopause  Mixed connective tissue disease ([facility] [address] 5/10/2017  Obesity, Class I, BMI 30-34.9  Olecranon bursitis of right elbow 5/4/2017  Opioid dependence ([facility] [address] On Methadone  Recurrent genital herpes 5/31/2013  Right Achilles tendinitis 5/2/2017  Sarcoidosis  Sickle cell trait ([facility] [address]  Tobacco use disorder  Type 2 diabetes with stage 3 chronic kidney disease GFR 30-59 ([facility])  Urge urinary incontinence 5/10/2017  Venous insufficiency  Wears glasses Patient taking anticoagulants no Patient has a defibrillator: no  
 
Assessment: 
? Changes in Assessment throughout shift: None ? Patient has central line: no Reasons if yes: Removed 12/16/19 Insertion date:n/a Last dressing date:n/a 
? Patient has Renae Cath: no Reasons if yes: n/a Insertion date:n/a 
 
? Last Vitals: There were no vitals filed for this visit. ? PAIN Pain Assessment Pain Intensity 1: 2 (12/17/19 0027) Pain Intensity 1: 2 (12/29/14 1105) Pain Location 1: Abdomen Pain Intervention(s) 1: Medication (see MAR) Patient Stated Pain Goal: 0 Patient Stated Pain Goal: 0 
o Intervention effective: yes   
o Other actions taken for pain: no c/o 
 
? Skin Assessment Skin color Skin Color: Appropriate for ethnicity Condition/Temperature Skin Condition/Temp: Dry, Warm Integrity Skin Integrity: Incision (comment)(back) Turgor Turgor: Non-tenting Weekly Pressure Ulcer Documentation  Pressure  Injury Documentation: No Pressure Injury Noted-Pressure Ulcer Prevention Initiated Wound Prevention & Protection Methods Orientation of wound Orientation of Wound Prevention: Posterior Location of Prevention Location of Wound Prevention: Heel, Sacrum/Coccyx Dressing Present Dressing Present : No 
Dressing Status Wound Offloading Wound Offloading (Prevention Methods): Bed, pressure redistribution/air ? INTAKE/OUPUT Recommendations: 1. Patient needs and requests: met 2. Diet: Cardiac DM Reg Corrine torres 3. Pending tests/procedures: none 4. Functional Level/Equipment: wheelchair 5. Estimated Discharge Date: TBD Posted on Whiteboard in Patients Room: yes HEALS Safety Check A safety check occurred in the patient's room between off going nurse and oncoming nurse listed above. The safety check included the below items Area Items H High Alert Medications ? Verify all high alert medication drips (heparin, PCA, etc.) E Equipment ? Suction is set up for ALL patients (with lashay) ? Red plugs utilized for all equipment (IV pumps, etc.) ? WOWs wiped down at end of shift. ? Room stocked with oxygen, suction, and other unit-specific supplies A Alarms ? Bed alarm is set for fall risk patients ? Ensure chair alarm is in place and activated if patient is up in a chair L Lines ? Check IV for any infiltration ? Renae bag is empty if patient has a Renae ? Tubing and IV bags are labeled Lex Kristy Safety ? Room is clean, patient is clean, and equipment is clean. ? Hallways are clear from equipment besides carts. ? Fall bracelet on for fall risk patients ? Ensure room is clear and free of clutter ? Suction is set up for ALL patients (with lashay) ? Hallways are clear from equipment besides carts. ? Isolation precautions followed, supplies available outside room, sign posted

## 2019-12-17 NOTE — CONSULTS
[facility] PSYCHOLOGICAL SCREENING Assessment Initiated:  December 17, 2019 Rehab Diagnosis:  Thoracic Laminectomy/Fusion T7-T12 Pertinent Physical/Psychiatric History:  
 
Patient Active Problem List  
Diagnosis Code  Type 2 diabetes with stage 3 chronic kidney disease GFR 30-59 ([facility]) E11.22, N18.3  Diabetic neuropathy associated with type 2 diabetes mellitus ([facility]) E11.40  Venous insufficiency I87.2  Obesity, Class I, BMI 30-34.9 E66.9  Chronic back pain M54.9, G89.29  
 Generalized osteoarthritis of multiple sites M15.9  Bipolar affective disorder ([facility] F31.9  Abnormally low high density lipoprotein (HDL) cholesterol with hypertriglyceridemia E78.6, E78.1  Diastolic dysfunction without heart failure I51.89  Chronic obstructive pulmonary disease (COPD) ([facility] J44.9  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 congestive heart failure ([facility]) I13.10, N18.3  CKD stage G3a/A1, GFR 45-59 and albumin creatinine ratio <30 mg/g ([facility]) N18.3  Depression F32.9  History of back injury Z87.828  
 Anxiety F41.9  Wears glasses Z97.3  History of hepatitis C Z86.19  
 Sickle cell trait ([facility]) D57.3  History of acute renal failure Z87.448  Hypothyroidism E03.9  Recurrent genital herpes A60.00  Sarcoidosis D86.9  Abscess of right arm L02.413  Hypoxemia requiring supplemental oxygen R09.02, Z99.81  
 Abnormal nuclear stress test R94.39  
 Cellulitis and abscess of hand L03.119, L02.519  
 Cellulitis and abscess of foot L03.119, L02.619  
 Cellulitis of arm, right L03. 113  
 Olecranon bursitis of right elbow M70.21  
 Contusion of left elbow S50. 02XA  Intravenous drug user F19.90  Right Achilles tendinitis M76.61  
 History of penicillin allergy Z88.0  Falls frequently R29.6  Gastroesophageal reflux disease with hiatal hernia K21.9, K44.9  Memory difficulty R41.3  Mixed connective tissue disease ([facility] M35.1  Impaired mobility and ADLs Z74.09  
 Hyperuricemia E79.0  History of vitamin D deficiency Z86.39  
 Urge urinary incontinence N39.41  Spinal cord compression ([facility]) G95.20  Compression fracture of body of thoracic vertebra ([facility]) S22.000A  Status post laminectomy with spinal fusion Z98.1  Acute blood loss as cause of postoperative anemia D62  
 History of fracture due to fall Z87.81  Chronic respiratory failure with hypoxia ([facility]) J96.11  
 Cellulitis of right forearm L03. 113  
 Gout M10.9  Menopause Z78.0  Long term current use of aspirin Z79.82  
 Opioid dependence ([facility]) F11.20  Tobacco use disorder F17.200 Patient with dual diagnostic psychiatric problems including diagnosis for Bipolar Disorder and she is now sixty five days into remission from heroin dependence. She is followed at [facility] and currently Rx: Depakote, Zoloft and Abilify, as well as [facility] in [address]. In addition to her history of many years of opioid dependence, she is also tobacco dependent and currently Rx Nicoderm Patch. OBJECTIVE GENERAL OBSERVATIONS Willingness to participate in program: [x] good   [] fair [] indifferent [] poor General Appearance:  Patient observed slight in stature, casually dressed in greens, sitting upright in wheelchair and with continuous O2 in place. Patient is observed obese. Sensory Impairments:  Patient has satisfactory auditory reception and comprehension and is able to respond to inquiry with intelligibility. Religion Affiliation:  [employer] Admission Assessment  Discharge Status [x] alert 
[] lethargic 
[] difficult to arouse 
[] fluctuating 
[] other: Level of Consciousness [x] alert 
[] lethargic 
[] difficult to arouse 
[] fluctuating 
[] other:  
[x] person 
[x] place [x] time [x] situation Oriented [x] person 
[x] place [x] time 
[x] situation [x] within normal limits [] impaired 
     [] mild  
     [] moderate  
     [] severe Attention [x] within normal limits [] impaired 
     [] mild  
     [] moderate  
     [] severe  
[] within normal limits [x] impaired 
     [x] mild  
     [] moderate  
     [] severe Memory [x] within normal limits [x] impaired 
     [x] mild  
     [] moderate  
     [] severe  
[x] appropriate to situation 
[] depressed 
[] anxious [] angry  
[] fearful 
[] emotionally labile 
[] other:  Mood [x] appropriate to situation 
[] depressed 
[] anxious [] angry  
[] fearful 
[] emotionally labile 
[] other:  
[x] appropriate 
[] flat 
[] inappropriate to content of speech Affect [x] appropriate 
[] flat 
[] inappropriate to content of speech [x] appropriate 
[] aggressive/agitated 
[] withdrawn 
[] inappropriate 
[] other: Patient insists that she understands need to ask for assist and she does not appear impulsive Behavior [] appropriate 
[] aggressive/agitated 
[] withdrawn 
[] inappropriate [x] other: Patient was excessively demanding of staff attention while on ARU and frequently required prompts and redirection to initiate what she could do for herself, or to remind her that constant requests for attention were excessive.  
[] good [x] limited 
[] denial 
[] none Insight Into Illness [] good [x] limited 
[] denial 
[] none [x] intact [x] impaired 
     [x] mild  
     [] moderate  
     [] severe Describe: Patient will benefit from cues for recall of novel and/or complex information Cognition [x] intact [x] impaired 
     [x] mild  
     [] moderate  
     [] severe Describe: Patient remained unreasonable in her requests/demands for assist from any and all staff [x] coping 
[] demonstrates poor adjustment 
[] undetermined As evidenced by:  She insists that she is motivated to improve Patient Adjustment to Disability [] coping 
[x] demonstrates poor adjustment 
[] undetermined As evidenced by: Patient's dependent style and frequent need for attention from staff was excessive  
[] coping 
[] demonstrates poor adjustment 
[x] undetermined As evidenced by: Not available on evaluation Family Adjustment to Disability [] coping 
[] demonstrates poor adjustment 
[x] undetermined As evidenced by: Patient had inconsistency in support available to her. ASSESSMENT Clinical Impression:  Patient is a pleasant and cooperative, 62year old, single, unemployed,  female. She resides in a condo on one level on first floor of building, but with five steps to enter and having bilateral handrails. She has been in residence for ten years and assisted by [facility]" and she hopes to remain in residence post ARU. Patient reports having several children residing in [place] [facility] and another in [place] and feels supported by them, as well as extended friends and family; she indicated she is active in her [employer]. She reports recent history of multiple falls for reasons that she could not explain. Since surgery, she feels that she is already \"better\" and she is hopeful for further improvement and return to independence. Parenthetically, patient was previously a patient on ARU and is therefore familiar with the treatment milieu. Emotionally, patient reports feeling generally calm and composed and she is not observed to be in acute distress. Patient acknowledges past history of Bipolar Disorder and is followed by [facility] and currently Rx: Abilify, Zoloft and Depakote, which are continuing on ARU per Dr. Loli Myers.   Additionally, patient has history of significant opioid dependence, beginning at age [de-identified] years old; she is, once again, in recovery (now for sixty five days) and receives daily Methadone at clinic in [address]. She has had legal entanglements and incarceration related to substance abuse and possession. Patient will be monitored for emotional and/or behavioral difficulties that could emerge while on ARU and be encouraged to persevere. Cognitively, patient is alert and oriented. She is able to understand and follow direction. She will benefit from cues and prompts for recall of novel and/or complex information. Patient Strengths:  Alert, oriented, pleasant, cooperative, reportedly motivated to improve and familiar with treatment milieu on ARU Patient Preferences:  Patient hopes to return to her own residence post ARU Rehab Potential:  Guarded Educational Needs: Under each heading list the specific items in which the patient or family will need education/training. Example: hip precautions, use of walker, ADL equipment, neglect, judgment, adjustment, etc.  
 
Special considerations or accommodations for teaching: 
[x] Yes     [] No     [] NA If Yes, explain: History of mood and substance abuse/dependence issues Discharge Status Completed Demonstrated/ Verbalized Understanding Yes No Yes No  
Info regarding disability: Limited insight about post surgery recovery [x] [] [x] [] Adjustment: Increased dependence [x] [] [] [x] Cognition: Difficulty with recall of new information [x] [] [] [x] Other: History of mood disorder [x] [] [x] [] Other: Situational stress [x] [] [x] [] If education not completed, explain: [] [] [] [] PLAN Problem: Limited insight about post surgery recovery Long Term Goal: Increase insight Intervention: Patient education At Discharge  LTG Achieved: [x] Yes [] No If not achieved, explain: 
 
Problem: Forced dependency Long Term Goal: Accept increased dependency Intervention: Patient education and support  At Discharge  LTG Achieved: [] Yes [x] No If not achieved, explain: Patient was not successful in balancing dependency versus independence issues on ARU Problem: Recall of novel information Long Term Goal: Maximize recall in treatment Intervention: Cues, prompts and redirection At Discharge  LTG Achieved: [x] Yes [] No If not achieved, explain: 
 
Problem: Situational stress Long Term Goal: Minimize subjective distress Intervention: Support  At Discharge  LTG Achieved: [x] Yes [] No If not achieved, explain: 
 
Problem: History of mood disorder Long Term Goal: Maintain mood stability Intervention: Rx and support  At Discharge  LTG Achieved: [x] Yes [] No If not achieved, explain: 
 
Carlos Smith, THE [facility]  12/17/2019 5:44 PM 
 
DISCHARGE STATUS Clinical Impressions: Patient's frequent demands for attention/assist while on ARU were excessive and sometimes overwhelmed staff; and, in turn, she required frequent cues and prompts to better understand that constant requests for assistance were unreasonable. On the other hand, patient did not display evidence of acute emotional distress and was generally able to maintain mood stability. She was discharged with psychotropic medications, as needed. Patient was advised about safety and pace in post hospital re-integration. Follow-up Services Recommended Purpose Carlos Smith, PHD  Discharge Date/Time:

## 2019-12-17 NOTE — PROGRESS NOTES
TRANSFER - IN REPORT: 
 
Verbal report received from  Andrés Martinez RN(name) on Stephen RODRIGUEZ Roots  being received from [address] [place] [address](unit) for routine progression of care Report consisted of patients Situation, Background, Assessment and  
Recommendations(SBAR). Information from the following report(s) SBAR, Kardex, [facility] and Recent Results was reviewed with the receiving nurse. Opportunity for questions and clarification was provided. Assessment completed upon patients arrival to unit and care assumed.

## 2019-12-17 NOTE — PROGRESS NOTES
10:00 am Dressing changed to lower back with large amount of serosanguinous drainage noted to drain site below incision, Dressing to incision site changed as well. Drainage seeped up to honeycomb dressing with large amount of drainage noted. Honeycomb dressing changed to mid back. No signs and symptoms of infection noted. 16:45 Dressing on drain site saturated again with serosanguinous drainage,Dr. Karmen Mireles notified of excessive drainage to lower back, Dr Solitario Branch surgeons office notified. 17:00 Spoke with Dr Solitario Stewart stated nurse practioner will be making rounds to see patient Wednesday afternoon to assess. Pressure ABD dressing applied to drain site and will continue to monitor closely.

## 2019-12-17 NOTE — TELEPHONE ENCOUNTER
Sadi, nurse at [place] [id number], called reporting serosanquinous drainage from the pervious drain site. They have had to change the dressing twice. I spoke with Dr. Miriam Perez and he said to just keep a dressing on it and one of the NPs will see her tomorrow and possible put dermabond on it.

## 2019-12-17 NOTE — PROGRESS NOTES
12/16/2019 
11:34 PM 
 
TRANSFER - OUT REPORT: 
 
Verbal report given to OLIVIER ramos(name) on Stephen Sinha  being transferred to ARU(unit) for routine progression of care Report consisted of patients Situation, Background, Assessment and  
Recommendations(SBAR). Information from the following report(s) SBAR, Kardex, ED Summary, OR Summary, Procedure Summary, Intake/Output, MAR, Accordion, Recent Results and Med Rec Status was reviewed with the receiving nurse. Lines:  
Peripheral IV 12/11/19 Left Forearm (Active) Site Assessment Clean, dry, & intact 12/16/2019  7:56 PM  
Phlebitis Assessment 0 12/16/2019  7:56 PM  
Infiltration Assessment 0 12/16/2019  7:56 PM  
Dressing Status Clean, dry, & intact 12/16/2019  7:56 PM  
Dressing Type Tape;Transparent 12/16/2019  7:56 PM  
Hub Color/Line Status Pink;Capped 12/16/2019  7:56 PM  
Action Taken Open ports on tubing capped 12/16/2019  4:07 AM  
Alcohol Cap Used Yes 12/16/2019  8:19 AM  
  
 
Opportunity for questions and clarification was provided. Patient transported with: 
 Monitor O2 @ 3 liters Registered Nurse

## 2019-12-17 NOTE — PROGRESS NOTES
[x] Psychology  [] Social Work [] Recreational Therapy INTERVENTION  UNITS/TIME OF SERVICE Assessment  December 17, 2019 Supportive Counseling Orientation Discharge Planning Resource Linkage Progress/Current Status Patient seen for Psychological Evaluation as requested on admission to ARU by Dr. Saul Jackson. Patient found sitting upright in wheelchair in room with continuous O2 in place. She is immediately affable and engaging and recalled a previous admit to ARU, several years ago. Patient is able to describe the circumstances of her need for spinal surgery and feeling somewhat gratified with noticeable improvement. She is motivated to improve and hopes to return to independent living, as able. She reports having \"a new boyfriend of seven weeks\" as well as various family and friends who may be available to her post hospital.  She has a very significant history of psychiatric and substance abuse problems and is followed by drug treatment services (for Methadone) as well as [facility] for various psychotropic medications, including: Depakote, Zoloft and Abilify. Patient insists that she is motivated to participate in therapy program on ARU; she denies acute feelings of distress at this time and no lability is observed. Patient will be monitored for any emotional and/or behavioral difficulties and encouraged to persevere.   
 
Carlos Smith, THE [facility] 12/17/2019 5:39 PM

## 2019-12-17 NOTE — PROGRESS NOTES
Problem: Self Care Deficits Care Plan (Adult) Goal: *Therapy Goal (Edit Goal, Insert Text) Description Occupational Therapy Goals Long Term Goals Initiated 19 and to be accomplished within 4 week(s)  to facilitate increased self care independence and strength for return to PLOF and decreased care giver burden: 2. Pt will perform grooming with Mod I. 
3. Pt will perform UB bathing with SBA. 4. Pt will perform LB bathing with Tyree. 5. Pt will perform tub/shower transfer <> TTB with Min A.  
6. Pt will perform UB dressing with Set-up. 7. Pt will perform LB dressing with Min A. 
8. Pt will perform toileting task with Min A. 
9. Pt will perform toilet transfer with Min A/CGA. Short Term Goals Initiated 19 and to be accomplished within 7 day(s) (19) to facilitate increased self care independence and strength for return to PLOF and decreased care giver burden: 1. Pt will perform self-feeding with Mod I.  
2. Pt will perform grooming with set-up. 3. Pt will perform UB bathing with Mod A. 
4. Pt will perform LB bathing with Mod A in LRE. 5. Pt will perform tub/shower transfer <> TTB with MaxA x 1. 
6. Pt will perform UB dressing with SBA. 7. Pt will perform LB dressing with Mod A using AE as needed. 8. Pt will perform toileting task with Max A x 1. 
9. Pt will perform toilet transfer with MaxA x 1. Outcome Measures: 
(19) Dynamometer  strength: Right= 27.33# (norm=55. 1#) Left= 34.67# (norm=45.7#) Outcome: Progressing Towards Goal 
 OCCUPATIONAL THERAPY EVALUATION Patient: Mallory Watters Roots 62 y.o. Date: 2019 Primary Diagnosis: Status post laminectomy with spinal fusion [Z98.1] Patient identified with name and : yes Past Medical History:  
Past Medical History:  
Diagnosis Date  Abnormally low high density lipoprotein (HDL) cholesterol with hypertriglyceridemia  Lipid profile (2016) showed , , HDL 38, LDL 37  
  Acute blood loss as cause of postoperative anemia 12/12/2019 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 congestive heart failure ([address] Better controlled  Bipolar affective disorder ([address] 12/5/2012  Cellulitis of right forearm 05/04/2017  Chronic back pain  Chronic obstructive pulmonary disease (COPD) ([address] SOB, on [place] O2 Recent admission with psychosis  Chronic respiratory failure with hypoxia ([address] On home oxygen inhalation at 3 LPM via NC  
 CKD stage G3a/A1, GFR 45-59 and albumin creatinine ratio <30 mg/g ([facility]) 5/11/2017 Urine microalbumin/Creatinine ratio (5/11/2017) = 9 mg/g  Contusion of left elbow 5/4/2017  Depression  Diabetic neuropathy associated with type 2 diabetes mellitus ([address]  Diastolic dysfunction without heart failure Stable on diuretics  Falls frequently  Gastroesophageal reflux disease with hiatal hernia  Generalized osteoarthritis of multiple sites  Gout On Allopurinol  History of acute renal failure 5/31/2013  History of back injury   
 jumped out of second story window  History of fracture due to fall 12/11/2019  
 History of hepatitis C   
 treated  History of penicillin allergy  History of vitamin D deficiency 5/10/2017  Hyperuricemia 5/26/2017  Hypothyroidism  Hypoxemia requiring supplemental oxygen 12/29/2014  Intravenous drug user 5/2/2017  Long term current use of aspirin  Memory difficulty  Menopause  Mixed connective tissue disease ([address] 5/10/2017  Obesity, Class I, BMI 30-34.9  Olecranon bursitis of right elbow 5/4/2017  Opioid dependence ([address] On Methadone  Recurrent genital herpes 5/31/2013  Right Achilles tendinitis 5/2/2017  Sarcoidosis  Sickle cell trait ([address]  Tobacco use disorder  Type 2 diabetes with stage 3 chronic kidney disease GFR 30-59 ([facility])  Urge urinary incontinence 5/10/2017  Venous insufficiency  Wears glasses Precautions: Spinal precautions, 3L O2 Time In: 0800 Time Out[de-identified] 4802 Time In: 9330 Time Out: 1315 Pain: 
Pt reports 8-9/10 pain or discomfort prior to treatment. Pt reports no numerical pain or discomfort post treatment, but states \"the pain pill really works. \" 
*Nursing notified for medicine mgmt SUBJECTIVE:  
Patient stated I have people who come to the house every day that help me with bathing and dressing. .. oh and getting in and out of the tub Pt's stated goal: \"To be able to walk again,\" and agreeable for OT related focus of being able to walk to perform toileting task with pt stating \"yes. \" OBJECTIVE DATA SUMMARY:  
Pt approached for OT eval with pt long sitting in bed, pt introduced to novel therapist with purpose/process of evaluation. Pt able to recall 3/3 spinal precautions I'ly when prompted. Pt performing bed mobility for supine>sit EOB with log roll with min/modA. Pt demo'ing sitting EOB with P+ to F- balance, holding on to bed rail with left UE. Pt demo'ing kyphotic posturing. Nursing notified for dressing change for pt's incision on back 2/2 to therapist noting incr drainage. Pt performing bathing/dressing bed level for incr safety and participation. Rehab tech present for safety and increased function with bed mobility and dressing tasks. Pt performing bed mobility with maxA and continuous cues for rolling side:side using log roll technique and total asst for LE mgmt. Pt presenting with bowel and bladder incontinence, requiring brief and libby pad change. Pt ending session with set-up for breakfast in chair position in bed. Pt re approached for 2nd portion of evaluation with nursing present finishing breathing treatment. Pt reporting having just performed denture mgmt with nursing present prior to lunch.  Pt presenting with bladder incontinence, requiring therapist and nursing asst for changing brief, pad, and clothing with maxA x 2 overall. Pt performing functional outcome measure assessments. [x]  Right hand dominant   []  Left hand dominant Therapy Diagnosis:  
Difficulty with ADLs  [x] Difficulty with functional transfers  [x] Difficulty with ambulation  [x] Difficulty with IADLs  [x] Problem List:   
Decreased strength B UE  [x] Decreased strength trunk/core  [x] Decreased AROM 2/2 to pain  [x] Decreased endurance  [x] Decreased balance sitting  [x] Decreased balance standing  [x] Pain   [x] Decreased PROM-not assessed at this time 2/2 to incr pain in incision site   [] Functional Limitations:  
Decreased independence with ADL  [x] Decreased independence with functional transfers  [x] Decreased independence with ambulation  [x] Decreased independence with IADL  [x] Previous Functional Level: Pre-Morbid Level of Function: Pt reporting having two shifts of aides in home (9-1, 3:) every day of week to (A) with bathing and dressing. Pt reporting aides assisting with transfers, washing back/feet, and LB dressing tasks. Pt reporting I'ly being able to transfer to toilet/toileting task with use of SPC for functional mobility. Home Environment: Home Situation Home Environment: Private residence # Steps to Enter: 4 Rails to Enter: Yes Hand Rails : Bilateral 
Wheelchair Ramp: No 
One/Two Story Residence: One story Living Alone: Yes Support Systems: Family member(s) Patient Expects to be Discharged to[de-identified] Private residence Current DME Used/Available at Home: Rolling walker, Cane, straight, shower chair, BSC Tub or Shower Type: Tub/Shower combination Barriers to Learning/Limitations: yes;  physical 
Compensate with: visual, verbal, tactile, kinesthetic cues/model Outcome Measures: FIM, MMT, Dynamometer MMT Initial Assessment Right Upper Extremity  Left Upper Extremity UE AROM Decreased AROM (to 90 degrees flexion with pain in thoracic incision with AROM noted). MMT not formally assessed due to limited ROM and pain. Pt able to wash abdomen and reach laterally to retrieve items during ADL. Decreased AROM (to 90 degrees flexion with pain in thoracic incision with AROM noted). MMT not formally assessed due to limited ROM and pain. Pt able to wash abdomen and reach laterally to retrieve items during ADL. Shoulder flexion Shoulder extension Shoulder ABDuction Shoulder ADDUction Elbow Flexion Elbow Extension Wrist Extension/Flexion  Decreased  noted, 27.33# Decreased  noted, 34.67#   
0/5 No palpable muscle contraction 1/5 Palpable muscle contraction, no joint movement 2-/5 Less than full range of motion in gravity eliminated position 2/5 Able to complete full range of motion in gravity eliminated position 2+/5 Able to initiate movement against gravity 3-/5 More than half but not full range of motion against gravity 3/5 Able to complete full range of motion against gravity 3+/5 Completes full range of motion against gravity with minimal resistance 4-/5 Completes full range of motion against gravity with minimal resistance 4/5 Completes full range of motion against gravity with moderate resistance 5/5 Completes full range of motion against gravity with maximum resistance Sensation: Reported neuropathy in distal finger tips (B) hands Coordination: Intact digit:digit (B) hands FIM SCORES Initial Assessment Bladder - level of assist 1 Bladder - accident frequency score 1 Bowel - level of assist 1 Bowel - accident frequency score Please see IRC Interdisciplinary Eval: Coordination/Balance Section for details regarding FIM score description. COGNITION/PERCEPTION Initial Assessment Reading Status Literate Writing Status [facility] Arousal/Alertness Generalized responses Orientation Level Oriented X4 Visual Fields (WFL reading pt's board ) Praxis Intact Body Scheme Appears intact COMPREHENSION MODE Initial Assessment Primary Mode of Comprehension Auditory Hearing Aide None Corrective Lenses Glasses Score 6 EXPRESSION Initial Assessment Primary Mode of Expression Verbal  
Score 6 Comments SOCIAL INTERACTION/PRAGMATICS Initial Assessment Score 5 Comments PROBLEM SOLVING Initial Assessment Score 4 Comments MEMORY Initial Assessment Score 5 Comments EATING Initial Assessment Functional Level 5 Comments Pt requiring encouragement to demo container mgmt I'ly, requiring set-up over all. Pt able to demo utensil use I'ly GROOMING Initial Assessment Functional Level 4 Oral Hygiene FIM: 4 Tasks completed by patient Washed face Comments Pt washing face seated EOB with Elida for sitting balance when pt letting go of bed railing to perform grooming task. BATHING Initial Assessment Functional Level 2 Body parts patient bathed Abdomen, Chest, Thigh, left, Thigh, right Comments UB: Pt requiring modA, pt demo'ing washing adbomen/chest with CGA for sitting balance. Pt requiring (A) for thoroughness for UEs and back. LB: Pt requiring maxA for LB bathing seated EOB/supine/sidelying. Pt assisting with washing thighs seated EOB, requiring therapist (A) for remainder. Pt requiring maxA to roll side:side following log roll in bed to perform perineal bathing. TUB/SHOWER TRANSFER INDEPENDENCE Initial Assessment Score 0 Comments NT at this time due to safety and medical status UPPER BODY DRESSING/UNDRESSING Initial Assessment Functional Level 3 Items applied/Steps completed Pullover (4 steps) Comments Pt requiring modA to [facility] gown and modA to shante t-shirt. Pt unable to pull down shirt anterior/posterior. LOWER BODY DRESSING/UNDRESSING Initial Assessment Functional Level 1 Sock and/or Shoe Management FIM: 2 Items applied/Steps completed Sock, left (1 step), Sock, right (1 step), Elastic waist pants (3 steps), Underpants (3 steps) Comments Pt requiring total asst for compression stockings/sock mgmt with pt seated EOB. Pt rolling side:side with maxA with rehab tech present to asst with brief mgmt and donning pants. Pt attempting to bridge hips to shante pants over hips with pt unable to fully clear sacrum. TOILETING Initial Assessment Functional Level 1 Comments Pt incontinent of bladder/bowel, requiring total asst for hygiene and brief mgmt from bed level. TOILET TRANSFER INDEPENDENCE Initial Assessment Transfer score 0 Comments NT at this time INSTRUMENTAL ADL Initial Assessment (PLOF) Meal preparation Total assistance Homemaking Total assistance Medicine Management Assist x1([place] [facility] manages medications ) Financial Management Total assistance(Daughter assists ) ASSESSMENT : 
Based on the objective data described above, the patient presents with decreased functional endurance,  strength, AROM, pain, balance, overall generalized weakness, sensation, incontinence, functional mobility, and ADL (I) impacting pt's safe functional return to home environment and impact on caregiver (aide) burden of care. Pt requiring encouragement for participation in self care tasks and appears at times to be self limiting in ability to perform tasks I'ly (ie stating, \"I don't know\" when prompted why she received assistance with denture mgmt). Pt would benefit from AE training and education to incr ADL participation and spinal precaution adherence. Pt would benefit from skilled occupational therapy in order to improve independent functional mobility/ADLs,/IADLs within the home.  Interventions may include range of motion (AROM, PROM B UE), motor function (B UE/ strengthening), activity tolerance (vitals, oxygen saturation levels), AE training, balance training, ADL/IADL training and functional transfer training. Please see IRC; Interdisciplinary Eval, Care Plan, and Patient Education for further information regarding occupational therapy examination and plan of care. PLAN : 
Recommendations and Planned Interventions: 
[x]               Self Care Training                   [x]        Therapeutic Activities [x]               Functional Mobility Training    []        Cognitive Retraining 
[x]               Therapeutic Exercises            [x]        Endurance Activities [x]               Balance Training                     []        Neuromuscular Re-Education []               Visual/Perceptual Training      [x]   Home Safety Training 
[x]               Patient Education                    [x]        Family Training/Education []               Other (comment): Frequency/Duration: Patient will be followed by occupational therapy 1-2 times per day/4-7 days per week to address goals. Discharge Recommendations: Home Health with 24 hr supervision/asst  
Further Equipment Recommendations for Discharge: tub transfer bench, grab bar installation in bathroom (pt reporting having 3:1 currently) Please refer to the flow sheet for vital signs taken during this treatment. After treatment:  
[] Patient left in no apparent distress sitting up in chair 
[x] Patient left in no apparent distress in bed 
[x] Call bell left within reach [x] Nursing aware 
[] Caregiver present 
[] Bed alarm activated COMMUNICATION/EDUCATION:  
[x] Home safety education was provided and the patient/caregiver indicated understanding. [x] Patient/family have participated as able in goal setting and plan of care. [x] Patient/family agree to work toward stated goals and plan of care. [] Patient understands intent and goals of therapy, but is neutral about his/her participation. [] Patient is unable to participate in goal setting and plan of care. Order received from MD for occupational therapy services and chart reviewed. Pt to be seen 5 times per week for 3 hours of total therapy per day for 4 weeks.   Thank you for the referral. 
 
Thank you for this referral. 
Jovita Jacobson, OT

## 2019-12-17 NOTE — PROGRESS NOTES
SHIFT CHANGE NOTE FOR Juana Roth Bedside and Verbal shift change report given to [facility] RN (oncoming nurse) by Mohsen Noonan LPN (offgoing nurse). Report included the following information SBAR, Kardex, MAR and Recent Results. Situation: 
 Code Status: Full Code Reason for Admission: Spinal Thoracic laminectomy T 6-T 11 Hospital Day: 1 Problem List:  
Hospital Problems  Date Reviewed: 12/17/2019 Codes Class Noted POA Chronic respiratory failure with hypoxia (HCC) (Chronic) ICD-10-CM: J96.11 
ICD-9-CM: 518.83, 799.02  Unknown Yes Overview Signed 12/16/2019 10:11 PM by Fco Gómez MD  
  On home oxygen inhalation at 3 LPM via NC Opioid dependence (HCC) (Chronic) ICD-10-CM: I95.89 ICD-9-CM: 304.00  Unknown Yes Overview Signed 12/16/2019 10:54 PM by Fco Gómez MD  
  On Methadone Acute blood loss as cause of postoperative anemia ICD-10-CM: D62 
ICD-9-CM: 285.1  12/12/2019 Yes Impaired mobility and ADLs ICD-10-CM: Z74.09 
ICD-9-CM: 799.89  12/11/2019 Yes Spinal cord compression (HCC) ICD-10-CM: G95.20 ICD-9-CM: 336.9  12/11/2019 Yes Compression fracture of body of thoracic vertebra (HCC) ICD-10-CM: S22.000A ICD-9-CM: 805.2  12/11/2019 Yes * (Principal) Status post laminectomy with spinal fusion ICD-10-CM: Z98.1 ICD-9-CM: V45.4  12/11/2019 Yes Overview Signed 12/16/2019 10:05 PM by Fco Gómez MD  
  S/P T10, T9, T8 laminectomy; T7, T8, T9, T10, T11, T12 posterior thoracic fusion; segmental spinal instrumentation; K2M [place] type, T7-T8, T11-T12 (12/11/2019 - Dr. Ros Pathak) History of fracture due to fall ICD-10-CM: Z87.81 ICD-9-CM: V15.51  12/11/2019 Yes Hypoxemia requiring supplemental oxygen (Chronic) ICD-10-CM: R09.02, Z99.81 ICD-9-CM: 799.02  12/29/2014 Yes Chronic obstructive pulmonary disease (COPD) (HCC) (Chronic) ICD-10-CM: J44.9 ICD-9-CM: 334  Unknown Yes Overview Signed 4/23/2013 10:01 AM by Jaden CALLES  
  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 
 Past Medical History:  
Past Medical History:  
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  
 CKD stage G3a/A1, GFR 45-59 and albumin creatinine ratio <30 mg/g ([facility]) 5/11/2017 Urine microalbumin/Creatinine ratio (5/11/2017) = 9 mg/g  Contusion of left elbow 5/4/2017  Depression  Diabetic neuropathy associated with type 2 diabetes mellitus ([address]  Diastolic dysfunction without heart failure Stable on diuretics  Falls frequently  Gastroesophageal reflux disease with hiatal hernia  Generalized osteoarthritis of multiple sites  Gout On Allopurinol  History of acute renal failure 5/31/2013  History of back injury   
 jumped out of second story window  History of fracture due to fall 12/11/2019  
 History of hepatitis C   
 treated  History of penicillin allergy  History of vitamin D deficiency 5/10/2017  Hyperuricemia 5/26/2017  Hypothyroidism  Hypoxemia requiring supplemental oxygen 12/29/2014  Intravenous drug user 5/2/2017  Long term current use of aspirin  Memory difficulty  Menopause  Mixed connective tissue disease ([facility] 5/10/2017  Obesity, Class I, BMI 30-34.9  Olecranon bursitis of right elbow 5/4/2017  Opioid dependence ([facility] On Methadone  Recurrent genital herpes 5/31/2013  Right Achilles tendinitis 5/2/2017  Sarcoidosis  Sickle cell trait [facility]  Tobacco use disorder  Type 2 diabetes with stage 3 chronic kidney disease GFR 30-59 ([facility])  Urge urinary incontinence 5/10/2017  Venous insufficiency  Wears glasses Patient taking anticoagulants no Patient has a defibrillator: no  
 
Assessment: 
? Changes in Assessment throughout shift: None ? Patient has central line: no Reasons if yes: Removed 12/16/19 Insertion date:n/a Last dressing date:n/a 
? Patient has Renae Cath: no Reasons if yes: n/a Insertion date:n/a 
 
? Last Vitals: 
  
Vitals:  
 12/17/19 0027 12/17/19 0751 12/17/19 1558 BP: 112/69 114/63 96/62 Pulse: 72 67 83 Resp: 18 18 18 Temp: 98.8 °F (37.1 °C) 98.4 °F (36.9 °C) 99.9 °F (37.7 °C) SpO2: 97% 99% 94% LMP: 11/25/2012  
 
 
? PAIN Pain Assessment Pain Intensity 1: 9 (12/17/19 1740) Pain Intensity 1: 2 (12/29/14 1105) Pain Location 1: Back Pain Location 1: Abdomen Pain Intervention(s) 1: Medication (see MAR) Pain Intervention(s) 1: Medication (see MAR) Patient Stated Pain Goal: 0 Patient Stated Pain Goal: 0 
o Intervention effective: yes   
o Other actions taken for pain: no c/o 
 
? Skin Assessment Skin color Skin Color: Appropriate for ethnicity Condition/Temperature Skin Condition/Temp: Dry, Warm Integrity Skin Integrity: Incision (comment) Turgor Turgor: Non-tenting Weekly Pressure Ulcer Documentation  Pressure  Injury Documentation: No Pressure Injury Noted-Pressure Ulcer Prevention Initiated Wound Prevention & Protection Methods Orientation of wound Orientation of Wound Prevention: Posterior Location of Prevention Location of Wound Prevention: Heel, Sacrum/Coccyx, Buttocks Dressing Present Dressing Present : Yes Dressing Status Wound Offloading Wound Offloading (Prevention Methods): Bed, pressure redistribution/air ? INTAKE/OUPUT Date 12/16/19 0700 - 12/17/19 7444 12/17/19 0700 - 12/18/19 7472 Shift [id number] 24 Hour Total [id number] [id number] 24 Hour Total  
INTAKE Shift Total        
OUTPUT Urine Urine Occurrence(s)    2 x  2 x Stool Stool Occurrence(s)    1 x  1 x Shift Total        
NET Weight (kg) Recommendations: 1. Patient needs and requests: met 2. Diet: Cardiac DM Reg Jeannie Alter liq 3. Pending tests/procedures: none 4. Functional Level/Equipment: wheelchair 5. Estimated Discharge Date: TBD Posted on Whiteboard in Patients Room: yes HEALS Safety Check A safety check occurred in the patient's room between off going nurse and oncoming nurse listed above. The safety check included the below items Area Items H High Alert Medications ? Verify all high alert medication drips (heparin, PCA, etc.) E Equipment ? Suction is set up for ALL patients (with yanker) ? Red plugs utilized for all equipment (IV pumps, etc.) ? WOWs wiped down at end of shift. ? Room stocked with oxygen, suction, and other unit-specific supplies A Alarms ? Bed alarm is set for fall risk patients ? Ensure chair alarm is in place and activated if patient is up in a chair L Lines ? Check IV for any infiltration ? Renae bag is empty if patient has a Renae ? [facility] ? Room is clean, patient is clean, and equipment is clean. ? Hallways are clear from equipment besides carts. ? Fall bracelet on for fall risk patients ? Ensure room is clear and free of clutter ? Suction is set up for ALL patients (with lashay) ? Hallways are clear from equipment besides carts. ? Isolation precautions followed, supplies available outside room, sign posted

## 2019-12-17 NOTE — PROGRESS NOTES
Problem: Diabetes Self-Management Goal: *Disease process and treatment process Description Define diabetes and identify own type of diabetes; list 3 options for treating diabetes. Outcome: Progressing Towards Goal 
Goal: *Incorporating nutritional management into lifestyle Description Describe effect of type, amount and timing of food on blood glucose; list 3 methods for planning meals. Outcome: Progressing Towards Goal 
Goal: *Incorporating physical activity into lifestyle Description State effect of exercise on blood glucose levels. Outcome: Progressing Towards Goal 
Goal: *Developing strategies to promote health/change behavior Description Define the ABC's of diabetes; identify appropriate screenings, schedule and personal plan for screenings. Outcome: Progressing Towards Goal 
Goal: *Using medications safely Description State effect of diabetes medications on diabetes; name diabetes medication taking, action and side effects. Outcome: Progressing Towards Goal 
Goal: *Monitoring blood glucose, interpreting and using results Description Identify recommended blood glucose targets  and personal targets. Outcome: Progressing Towards Goal 
Goal: *Prevention, detection, treatment of acute complications Description List symptoms of hyper- and hypoglycemia; describe how to treat low blood sugar and actions for lowering  high blood glucose level. Outcome: Progressing Towards Goal 
Goal: *Prevention, detection and treatment of chronic complications Description Define the natural course of diabetes and describe the relationship of blood glucose levels to long term complications of diabetes. Outcome: Progressing Towards Goal 
Goal: *Developing strategies to address psychosocial issues Description Describe feelings about living with diabetes; identify support needed and support network Outcome: Progressing Towards Goal 
Goal: *Insulin pump training Outcome: Progressing Towards Goal 
 Goal: *Sick day guidelines Outcome: Progressing Towards Goal 
Goal: *Patient Specific Goal (EDIT GOAL, INSERT TEXT) Outcome: Progressing Towards Goal 
  
Problem: Patient Education: Go to Patient Education Activity Goal: Patient/Family Education Outcome: Progressing Towards Goal 
  
Problem: Falls - Risk of 
Goal: *Absence of Falls Description Document Tia Manus Fall Risk and appropriate interventions in the flowsheet. Outcome: Progressing Towards Goal 
Note: Fall Risk Interventions: 
Mobility Interventions: Bed/chair exit alarm, Patient to call before getting OOB Medication Interventions: Patient to call before getting OOB Elimination Interventions: Call light in reach, Bed/chair exit alarm, Patient to call for help with toileting needs History of Falls Interventions: Bed/chair exit alarm Problem: Patient Education: Go to Patient Education Activity Goal: Patient/Family Education Outcome: Progressing Towards Goal 
  
Problem: Pressure Injury - Risk of 
Goal: *Prevention of pressure injury Description Document Florian Scale and appropriate interventions in the flowsheet. Outcome: Progressing Towards Goal 
Note: Pressure Injury Interventions: 
Sensory Interventions: Minimize linen layers, Assess changes in LOC Moisture Interventions: Absorbent underpads, Limit adult briefs, Maintain skin hydration (lotion/cream) Activity Interventions: PT/OT evaluation Mobility Interventions: Pressure redistribution bed/mattress (bed type) Nutrition Interventions: Document food/fluid/supplement intake Friction and Shear Interventions: Minimize layers Problem: Patient Education: Go to Patient Education Activity Goal: Patient/Family Education Outcome: Progressing Towards Goal 
  
Problem: Infection - Risk of, Surgical Site Infection Goal: *Absence of surgical site infection signs and symptoms Outcome: Progressing Towards Goal 
  
 Problem: Patient Education: Go to Patient Education Activity Goal: Patient/Family Education Outcome: Progressing Towards Goal 
  
Problem: Pain Goal: *Control of Pain Outcome: Progressing Towards Goal 
Goal: *PALLIATIVE CARE:  Alleviation of Pain Outcome: Progressing Towards Goal 
  
Problem: Patient Education: Go to Patient Education Activity Goal: Patient/Family Education Outcome: Progressing Towards Goal

## 2019-12-17 NOTE — PROGRESS NOTES
12/16/2019 
7:54 PM 
 
Purewick applied to patient. No orders requesting otherwise of staff. Patient reports incontinence at home. Has required 2 bed changes and 2 bedpans since 6:53 pm this evening. 10:02 PM 
Patient requesting pain medication to assist with pain when moving. Currently patient refusing to get out of bed due to \"legs buckling\"earlier. States her knees went forward. 11:15 Triple lumen catheter removed prior4 to transport. 11:28 PM 
Report given to [facility], LPN for transfer to ARU, room 190.

## 2019-12-17 NOTE — PROGRESS NOTES
Problem: Mobility Impaired (Adult and Pediatric) Goal: *Therapy Goal (Edit Goal, Insert Text) Description Physical Therapy Goals: STG Initiated 12/17/2019 and to be accomplished within 7 day(s) on 12/24/2019: 
1. Patient will move from supine to sit and sit to supine , scoot up and down and roll side to side in bed with moderate assistance . 2.  Patient will transfer from bed to chair and chair to bed with maximal assistance with one person assist using the least restrictive device. 3.  Patient will perform sit to stand with maximal assistance with one person assist using appropriate AD. 4.  Patient will perform static sitting balance without UE support for at least 2 minutes, demonstrating appropriate upright and midline posture at supervision level for ease of preparation for transfers. 5.  Patient will perform wheelchair mobility moderate assist for 150 ft distance over even surfaces. Physical Therapy Goals: LTG Initiated 12/17/2019 and to be accomplished within 28 day(s) on 01/14/2019:  
1. Patient will move from supine to sit and sit to supine , scoot up and down and roll side to side in bed with supervision/set-up. 2.  Patient will transfer from bed to chair and chair to bed with contact guard assist/stand-by assist using the least restrictive device. 3.  Patient will perform sit to stand with minimal assistance/contact guard assist. 
4.  Patient will participate in gait assessment if and when appropriate. 5.  Patient will perform wheelchair mobility over even surfaces at supervision level for at least 150 ft, including negotiation of doorways. 6.  Patient will perform wheelchair mobility up/down ramp, uneven sidewalk min A level. 12/17/2019 1556 by Smith Alexander PT Outcome: Progressing Towards Goal 
 PHYSICAL THERAPY EVALUATION Patient: Modesto Maldonado (65 y.o. female) Date: 12/17/2019 Diagnosis: Status post laminectomy with spinal fusion [Z98.1] Status post laminectomy with spinal fusion Precautions:  spinal precautions, compression fracture, falls risk precaution, 3 LPM via nasal cannula Chart, physical therapy assessment, plan of care and goals were reviewed. Pain: 
Pt pain was reported as 7/10 pre-treatment. Pt pain was reported as 7/10 post-treatment. Intervention: Patient medicated for pain just prior to treatment; treatment modified. Time in: 0930 Time out: 1030 Time In: 9430 Time Out: 1235 Pt seen for: Initial evaluation, therapeutic activity, therapeutic exercise, wheelchair management, balance activities Patient identified with name and : yes SUBJECTIVE:  
 
Patient stated A few months ago I was fine.  Patient reporting that she was able to walk without a device ~ 3 months ago, states that more recently has had falls and worsening weakness, needing a cane and ultimately needing surgery since she was unable to walk. Reports that she has nursing aids at home who help her with bathing and dressing due to her neuropathy. Patient agreeable to evaluation and treatment. Patient's Goal for Physical Therapy: Patient reporting that she wants to be able to walk. OBJECTIVE DATA SUMMARY:  
 
Past Medical History:  
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  
 CKD stage G3a/A1, GFR 45-59 and albumin creatinine ratio <30 mg/g ([facility]) 2017 Urine microalbumin/Creatinine ratio (2017) = 9 mg/g Contusion of left elbow 2017 Depression Diabetic neuropathy associated with type 2 diabetes mellitus ([address] Diastolic dysfunction without heart failure Stable on diuretics Falls frequently Gastroesophageal reflux disease with hiatal hernia Generalized osteoarthritis of multiple sites Gout On Allopurinol History of acute renal failure 2013 History of back injury   
 jumped out of second story window History of fracture due to fall 2019 History of hepatitis C   
 treated History of penicillin allergy History of vitamin D deficiency 5/10/2017 Hyperuricemia 2017 Hypothyroidism Hypoxemia requiring supplemental oxygen 2014 Intravenous drug user 2017 Long term current use of aspirin Memory difficulty Menopause Mixed connective tissue disease ([address] 5/10/2017 Obesity, Class I, BMI 30-34.9 Olecranon bursitis of right elbow 2017 Opioid dependence ([address] On Methadone Recurrent genital herpes 2013 Right Achilles tendinitis 2017 Sarcoidosis Sickle cell trait ([address] Tobacco use disorder Type 2 diabetes with stage 3 chronic kidney disease GFR 30-59 ([facility]) Urge urinary incontinence 5/10/2017 Venous insufficiency Wears glasses Past Surgical History:  
Procedure Laterality Date [address] HX  SECTION    
 HX CYST REMOVAL Left  Left foot HX OTHER SURGICAL Left 2017 S/P incision and drainage of the abscess of the left hand and the left foot (2017 - Dr. Yosef [facility]) HX THORACIC LAMINECTOMY  2019  S/P T10, T9, T8 laminectomy; T7, T8, T9, T10, T11, T12 posterior thoracic fusion; segmental spinal instrumentation; K2M Davis type, T7-T8, T11-T12 (12/11/2019 - Dr. Ish Joshi) HX TUBAL LIGATION Therapy Diagnosis:  
Difficulty with bed mobility  [x] Difficulty with functional transfers  [x] Difficulty with ambulation  [x] Difficulty with stair negotiations  [x] Problem List:   
Decreased strength B LE  [x] Decreased strength trunk/core  [x] Decreased AROM   [x] Decreased PROM  [] Decreased endurance  [x] Decreased balance sitting  [x] Decreased balance standing  [x] Pain   [x] Slow ambulation velocity (unable to assess)  [] Decreased coordination  [] Decreased safety awareness  [x] Functional Limitations:  
Decreased independence with bed mobility  [x] Decreased independence with functional transfers  [x] Decreased independence with ambulation  [x] Decreased independence with stair negotiation  [x] Previous Functional Level: Pt reporting having two shifts of aides in home (9-1, 3:) every day of week to (A) with bathing and dressing. Pt reporting aides assisting with transfers, washing back/feet, and LB dressing tasks. Pt reporting that she needed to use a cane more recently for getting to appointments and with her walking, though ultimately was unable to walk leading to her surgery. Home Environment: Home Environment: Private residence (condo) # Steps to Enter: 5 Rails to Enter: Yes Hand Rails : Bilateral 
Wheelchair Ramp: No 
One/Two Story Residence: One story Living Alone: Yes Support Systems: Family member(s) Patient Expects to be Discharged to[de-identified] Private residence Current DME Used/Available at Home: Cane, straight Tub or Shower Type: Tub/Shower combination Barriers to Learning/Limitations: none Compensate with: visual, verbal, tactile, kinesthetic cues/model Outcome Measures: FIMs, CARE tool items. Patient not appropriate for assessment of gait, up/down curb, stairs, picking up an object at this time. MMT Initial Assessment Right Lower Extremity Left Lower Extremity Hip Flexion 2- 1 Knee Extension 3- 2 Knee Flexion 2 1 Ankle Dorsiflexion 1 1  
0/5 No palpable muscle contraction 1/5 Palpable muscle contraction, no joint movement 2-/5 Less than full range of motion in gravity eliminated position 2/5 Able to complete full range of motion in gravity eliminated position 2+/5 Able to initiate movement against gravity 3-/5 More than half but not full range of motion against gravity 3/5 Able to complete full range of motion against gravity 3+/5 Completes full range of motion against gravity with minimal resistance 4-/5 Completes full range of motion against gravity with minimal-moderate resistance 4/5 Completes full range of motion against gravity with moderate resistance 4+/5 Completes full range of motion against gravity with moderate-maximum resistance 5/5 Completes full range of motion against gravity with maximum resistance GROSS ASSESSMENT Initial Assessment 12/17/2019 AROM Grossly decreased, non-functional  
Strength Grossly decreased, non-functional  
Coordination (unable to assess due to weakness) Tone Abnormal(decreased tone bilat LE) Sensation Impaired(h/o neuropathy) PROM Generally decreased, functional  
 
POSTURE Initial Assessment 12/17/2019 Posture (WDL) Exceptions to [facility] Posture Assessment Forward head;Rounded shoulders BALANCE Initial Assessment Sitting - Static: Fair (occasional) Sitting - Dynamic: Fair (occasional); Occassional;Poor (constant support) Standing - Static: Poor;Constant support Standing - Dynamic : (not tested) FIM SCORES Initial Assessment Bed/Chair/Wheelchair Transfers Transfer Type: Lateral with transfer board Transfer Assistance : 1 (Total assistance)(Max A x 2 for safety, sequencing, appropriate ant trunk ) Sit to Stand Assistance: Total assistance Car Transfers: Not tested Car Type: N/A  
 Wheelchair Mobility Able to Propel (ft): 140 feet(max A for steering) Functional Level: 2 Curbs/Ramps Assist Required (FIM Score): 0 (Not tested) Wheelchair Setup Assist Required : 2 (Maximal assistance) Wheelchair Management: Manages left brake(needing assist with brakes) Walking Early 0 (Not tested) Steps/Stairs Steps/Stairs Ambulated (#): 0 Level of Assist : 0 (Not tested) Rail Use: (N/A) PRIMARY MODE OF LOCOMOTION: wheelchair at this time, most likely wheelchair upon discharge Please see C Interdisciplinary Eval: Coordination/Balance Section for details regarding FIM score description. BED/CHAIR/WHEELCHAIR TRANSFERS Initial Assessment Rolling Right 2 (Maximal assistance) Rolling Left 2 (Maximal assistance) Supine to Sit 2 (Maximal assistance) Sit to Stand Total assistance Sit to Supine 2 (Maximal assistance) Transfer Assist Score Transfer Type: Lateral with transfer board Transfer Assistance : 1 (Total assistance)(Max A x 2 for safety, sequencing, appropriate ant trunk ) Sit to Stand Assistance: Total assistance Car Transfers: Not tested Car Type: N/A Transfer Type Lateral with transfer board Comments using transfer board needing max A x 2 for safety, cues for sequencing and appropriate scooting technique, appropriate forward trunk position (needing significant assist to maintain). Educated regarding head/hips relationship. Car Transfer Not tested Car Type N/A  
 
 
WHEELCHAIR MOBILITY/MANAGEMENT Initial Assessment Able to Propel 140 feet(max A for steering) Functional Level 2 Curbs/ramps assistance required 0 (Not tested) Wheelchair set up assistance required 2 (Maximal assistance) Wheelchair management Manages left brake(needing assist with brakes) WALKING INDEPENDENCE Initial Assessment Assistive device (N/A) Ambulation assistance - level surface 0 (Not tested) Distance 0 Feet (ft) Functional Level 0  
 Comments not tested at this time due to safety concern Ambulation assistance - unlevel surface (not yet able to assess) GAIT Initial Assessment 12/17/2019 Gait Description (WDL) Exceptions to WDL(not able to assess) Gait Abnormalities (not yet able to assess gait) STEPS/STAIRS Initial Assessment Steps/Stairs ambulated Steps/Stairs Ambulated (#): 0 Level of Assist : 0 (Not tested) Rail Use: (N/A) Rail Use (N/A) Functional Level 0 Comments unable to assess at this time due to safety/weakness Curbs/Ramps (not yet able to assess) Therapeutic Exercises:  
 Patient performing glut sets, quad sets x 10 reps each, AAROM hip/knee flexion and ankle DF bilaterally. ASSESSMENT : 
Based on the objective data described above, the patient presents with decreased strength bilat LE, decreased trunk strength, impaired sensation (light touch and proprioception), impaired static/dynamic sitting balance, impaired static balance and inability to participate in dynamic standing balance leading to difficulty performing safe and independent functional mobility. Patient will benefit from skilled intervention to address the above impairments. Patients rehabilitation potential is considered to be Good. Given her significant LE weakness, currently safest at wheelchair level and will likely continue to be at wheelchair level upon discharge; however, will continue to reassess and update goals as appropriate Factors which may influence rehabilitation potential include:  
[]         None noted 
[]         Mental ability/status [x]         Medical condition 
[x]         Home/family situation and support systems (has 5 steps to enter condo and no ramp) []         Safety awareness [x]         Pain tolerance/management 
[]         Other: PLAN : 
See STG and LTG above. Goals to be updated as appropriate. Order received from MD for physical therapy services and chart reviewed. Pt to be seen 5 times per week for 3 hours of total therapy per day for 4 weeks. Thank you for the referral. 
 
Pt would benefit from skilled physical therapy in order to improve independent functional mobility within the home. Interventions may include range of motion (AROM, PROM B LE/trunk), motor function (B LE/trunk strengthening/coordination), activity tolerance (vitals, oxygen saturation levels), bed mobility training, balance activities, gait training (progressive ambulation program), wheelchair management and functional transfer training. Discharge Recommendations: [facility] Further Equipment Recommendations for Discharge: wheelchair likely going to be required upon discharge; will continue to reassess as patient progresses. Activity Tolerance:  
Fair. Please refer to the flowsheet for vital signs taken during this treatment. After treatment:  
Patient left seated in wheelchair, call button within reach after first session; left supine in bed following second session. COMMUNICATION/EDUCATION:  
[x]         Fall prevention education was provided and the patient/caregiver indicated understanding. [x]         Patient/family have participated as able in goal setting and plan of care. [x]         Patient/family agree to work toward stated goals and plan of care. []         Patient understands intent and goals of therapy, but is neutral about his/her participation. []         Patient is unable to participate in goal setting and plan of care. Thank you for this referral. 
Tunde Fernandez, PT 
12/17/2019

## 2019-12-17 NOTE — H&P
[address] 
[address] [place] [address], [address] INPATIENT REHABILITATION 
HISTORY AND PHYSICAL 
(Post Admission Physician Evaluation) Name: Margot Sinha CSN: [id number] Age: 62 y.o. MRN: [id number] Sex: female Admit Date: 12/16/2019 PCP: Patricia Anaya NP 
 
 
Primary Rehab Impairment Category (MARISSA): Traumatic spinal cord injury Impairment Group Label: Other traumatic spinal cord dysfunction Etiologic Diagnosis: Acute compression fractures of body of thoracic vertebrae (T9 and T10) due to fall Subjective:  
 
Patient seen and examined. History of the Present Illness: The patient is a 60-year-old anmar female with multiple medical comorbidities who was admitted to [facility] on 12/11/2019 due to weakness of both lower extremities. The patient was apparently well until ~4 days prior to admission, the patient had 4 falls as she stated she lost her balance. Since then, the patient noticed her legs became weaker and weaker to the point that she cannot walk anymore. The patient was brought to the [facility] Emergency Department for further evaluation. The patient was seen and examined by Emergency Medicine ANDRE Rios). Excerpt (Additional History) from the ED Provider Note by ANDRE Rios: 
\"Stephen Sinha is a 62 y.o. female with PMHX of hypertension, sarcoidosis, COPD on chronic O2 by nasal cannula, chronic pain on methadone, gout, diabetes, CKD who presents to the emergency department C/O progressively worsening bilateral lower extremity 3 days. Patient states she was seen at [facility] ED 3 days ago for increasing cough and congestion with sputum production x1 month. She also complained of low back pain which is chronic for her but seem to be worsening and attributed to the cough.   She was recently prescribed steroid tapers for gout and COPD exacerbation. She is also given Skelaxin 3 days ago which she has been taking without relief.  3 days ago she developed worsening bilateral lower extremity symmetric weakness, so discontinued the Skelaxin without improvement. Today she has not been able to ambulate or bear weight at all. Pt denies lower extremity sensation changes, saddle anesthesia, bowel or bladder incontinence, abdominal pain, new injury, trauma or fall, and any other sxs or complaints. \" X-ray of the chest (12/10/2019) showed prominent pulmonary interstitium likely at least partly due to chronic interstitial lung disease; component of interstitial infiltrate/edema difficult to completely exclude; no confluent consolidation. X-ray of the lumbar spine (12/10/2019) showed no clearly acute findings. CT scan of the head (12/10/2019) showed no acute intracranial hemorrhage or territorial infarct; no mass effect; unremarkable for age. MRI of the lumbar spine (12/10/2019) showed degenerative changes most notable at L3-S1 characterized by mild disc herniations and mild to moderate facet arthropathy; L4-L5 moderate spinal canal stenosis; no significant spinal canal stenosis elsewhere; L5-S1 moderate foraminal stenoses with possible abutment of the right L5 nerve; mild L4-L5 foraminal stenoses; L5-S1 annular fissure noted a potential source of pain; no clearly acute findings; incidental findings including suspected splenic cyst or hemangioma, probable renal cysts, and distended bladder; additional subtle finding of left paraspinous muscle edema at T10 level, noted at field-of-view edge, incompletely evaluated.  
 
MRI of the thoracic spine (12/11/2019) showed findings that suggest acute compression fractures at T9 and T10, with severe vertebral body height loss at T9 and associated retropulsion of about 4 mm at the level of T9; this results in severe spinal canal stenosis at this level, with mild cord compression and surrounding cord edema; severe bilateral foraminal stenosis is also present at T9-T10; moderate to severe bilateral foraminal stenosis at T10-T11; moderate to severe right 
foraminal stenosis at T8-T9; chronic T5 compression fracture without retropulsion; there is moderate bilateral foraminal stenosis at T5-T6; redemonstrated T2 bright mass in the spleen, likely representing splenic hemangioma; additional T2 bright lesions or favor represent splenic cysts. MRI of the cervical spine (12/11/2019) showed mild to moderate degenerative changes, most pronounced at C6-C7 where there is moderate spinal canal stenosis and severe left foraminal stenosis; additional mild-to-moderate degenerative changes. Spine Surgery consult (Dr. Wilver Quinteros) was called for evaluation and comanagement. [facility] (Dr. Karthik Murphy) was called for clearance for possible central pontine myelinolysis since serum sodium had gone from 120 at 5PM to 128 at 4AM. As per communication between the ED physician and Dr. Chas Swain, the patient had normal cranial nerve function and upper body strength and this is not compatible with CPM. 
 
The patient was admitted under the service of the [facility] (Dr. Marlo Armenta). Excerpt (History of the Present Illness) from the H&P by ANDRE Guzmán: 
\"Ms. Sinha is a 63 yo [facility] American female with a past medical history of Type 2 DM, COPD, sarcoidosis on chronic steroids/on 3L NC, gout, hyperlipidemia and urinary incontinence who presented to [facility] ED for falls at home, low back pain and paraparesis. Patient states she has fallen at home 4 times since Saturday Dec 7, 2019. She \"lost her balance\". She lives at home with a friend who had to assist her up from the floor. She denied head trauma and LOC. Ever since the falls, patient states she can not move her legs/ambulate.  At baseline she is able to ambulate independently without assistance. Patient endorses \"cold for the past 1 month\" with productive cough yellow sputum. She endorses low back pain and the inability to move LEs. She denies fever, chills, chest pain, palpitations, SOB, abd pain, nausea/vomiting, bowel incontinence. \" The patient underwent a T10, T9, T8 laminectomy; T7, T8, T9, T10, T11, T12 posterior thoracic fusion; segmental spinal instrumentation; K2M Davis type, T7-T8, T11-T12 on 12/11/2019 done by Dr. Ryan Valenzuela. The patient tolerated the procedure well with no intraoperative complications. The patient developed acute postoperative blood loss anemia but no blood transfusion was given. Patient was given 2 doses of intravenous iron. Pulmonary Medicine consult (Dr. Rosetta Gonzalez) was called for evaluation and comanagement. MRI of the thoracic spine (12/16/2019) showed postsurgical change from T8 through T10 posterior decompression with hardware fusion T7-T12 for treatment of canal stenosis due to pathologic T9 compression fracture; preserved spinal alignment; similar mildly increased upper thoracic curvature due to chronic T5 compression deformity; similar mild inferior endplate compression deformity T10; patchy peripheral airspace disease dependent right lung suspected to reflect acute inflammatory process. Pain was controlled with Percocet 5/325. SCDs were used for DVT prophylaxis. The patient had remained hemodynamically stable but due to the abovementioned neurologic deficit, the patient was noted to have impaired mobility and ADLs. Patient was felt to be a good candidate for acute inpatient rehabilitation. Upon evaluation by Physical Therapy and Occupational Therapy, the patient was recommended for acute inpatient rehabilitation. The patient was discharged and was subsequently admitted to the [facility] for intensive rehabilitation to help recover strength, function and mobility. Past Medical History: Past Medical History:  
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  
 CKD stage G3a/A1, GFR 45-59 and albumin creatinine ratio <30 mg/g ([facility]) 5/11/2017 Urine microalbumin/Creatinine ratio (5/11/2017) = 9 mg/g  Contusion of left elbow 5/4/2017  Depression  Diabetic neuropathy associated with type 2 diabetes mellitus ([address]  Diastolic dysfunction without heart failure Stable on diuretics  Falls frequently  Gastroesophageal reflux disease with hiatal hernia  Generalized osteoarthritis of multiple sites  Gout On Allopurinol  History of acute renal failure 5/31/2013  History of back injury   
 jumped out of second story window  History of fracture due to fall 12/11/2019  
 History of hepatitis C   
 treated  History of penicillin allergy  History of vitamin D deficiency 5/10/2017  Hyperuricemia 5/26/2017  Hypothyroidism  Hypoxemia requiring supplemental oxygen 12/29/2014  Intravenous drug user 5/2/2017  Long term current use of aspirin  Memory difficulty  Menopause  Mixed connective tissue disease ([address] 5/10/2017  Obesity, Class I, BMI 30-34.9  Olecranon bursitis of right elbow 5/4/2017  Opioid dependence ([address] On Methadone  Recurrent genital herpes 5/31/2013  Right Achilles tendinitis 2017  Sarcoidosis  Sickle cell trait ([facility]  Tobacco use disorder  Type 2 diabetes with stage 3 chronic kidney disease GFR 30-59 ([facility])  Urge urinary incontinence 5/10/2017  Venous insufficiency  Wears glasses Past Surgical History: 
Past Surgical History:  
Procedure Laterality Date Toño  HX  SECTION    
 HX CYST REMOVAL Left  Left foot  HX OTHER SURGICAL Left 2017 S/P incision and drainage of the abscess of the left hand and the left foot (2017 - Dr. Eliazar Haney. Beatrice Smart)  HX THORACIC LAMINECTOMY  2019 S/P T10, T9, T8 laminectomy; T7, T8, T9, T10, T11, T12 posterior thoracic fusion; segmental spinal instrumentation; K2M Rex type, T7-T8, T11-T12 (2019 - Dr. Ish Joshi)  HX TUBAL LIGATION Allergies: Allergies Allergen Reactions  Moxifloxacin Rash  Pcn [Penicillins] Swelling  Sulfa (Sulfonamide Antibiotics) Swelling Social History: The patient is single, lives alone in a [place] [employer] [id number] unit with a 5-step entry in [place], [place]. She has a significant other who stays with her at nighttime. She smokes about 1/2 pack of cigarettes per day. She denies any alcohol use. She admits to using intravenous heroin and cocaine (last use reported was 65 days ago). She is on disability. Family History: Both parents are . Family History Problem Relation Age of Onset  Seizures Other  Diabetes Mother  Hypertension Mother  Heart Disease Mother  Cancer Mother  Diabetes Father  Stroke Father  Heart Disease Father  Headache Sister  Depression Neg Hx  Suicide Neg Hx  Psychotic Disorder Neg Hx  Substance Abuse Neg Hx  Dementia Neg Hx Home Medications (from the H&P prepared by [place]):  
Prior to Admission Medications Prescriptions Last Dose Informant Patient Reported? Taking? ARIPiprazole (ABILIFY) 5 mg tablet     Yes No  
Sig: Take 10 mg by mouth daily. Indications: BIPOLAR DISORDER IN REMISSION  
BD INSULIN SYRINGE ULTRA-FINE 1 mL 31 gauge x 5/16 syrg     Yes No  
Nebulizer Accessories Kit     No No  
Sig: Use with nebulizer machine Oxygen-Air Delivery Systems     Yes No  
Sig: by Nasal route continuous. 2 LPM via NC  Indications: Hypoxemia requiring supplementary oxygen  
acetaminophen (TYLENOL) 325 mg tablet     Yes No  
Sig: Take  by mouth every four (4) hours as needed for Pain. albuterol (PROAIR HFA) 90 mcg/actuation inhaler     No No  
Sig: INHALE 2 PUFFS EVERY 4 HOURS AS NEEDED FOR WHEEZING  
albuterol (PROVENTIL VENTOLIN) 2.5 mg /3 mL (0.083 %) nebulizer solution     No No  
Sig: USE 1 NEB EVERY 4 HOURS FOR WHEEZING AND SHORTNESS OF BREATH  
allopurinol (ZYLOPRIM) 100 mg tablet     Yes No  
Sig: Take 100 mg by mouth daily. aspirin 81 mg chewable tablet     No No  
Sig: CHEW 1 TABLET DAILY WITH BREAKFAST  
cholecalciferol (VITAMIN D3) 1,000 unit tablet     No No  
Sig: Take 2 Tabs by mouth daily. Indications: PREVENTION OF VITAMIN D DEFICIENCY  
cloNIDine HCl (CATAPRES) 0.1 mg tablet     No No  
Sig: Take 1 Tab by mouth three (3) times daily as needed (withdrawl symptoms) for up to 20 doses. divalproex DR (DEPAKOTE) 250 mg tablet     Yes No  
Sig: Take 250 mg by mouth nightly. Indications: BIPOLAR DISORDER IN REMISSION  
docusate sodium (COLACE) 100 mg capsule     No No  
Sig: Take 1 Cap by mouth two (2) times a day. famotidine (PEPCID) 20 mg tablet     No No  
Sig: Take 1 Tab by mouth daily. Indications: Stress Ulcer Prevention  
famotidine (PEPCID) 20 mg tablet     No No  
Sig: TAKE 1 TABLET BY MOUTH ONCE DAILY  
gabapentin (NEURONTIN) 300 mg capsule     Yes No  
Sig: Take 300 mg by mouth daily as needed.  Indications: Neuropathic Pain  
insulin glargine (LANTUS) 100 unit/mL injection     No No  
Sig: Inject 70 units subcutaneously before breakfast (7AM) and 40 units subcutaneously after dinner (7PM). Indications: type 2 diabetes mellitus  
insulin lispro (HUMALOG) 100 unit/mL injection     No No  
Sig: To be given 15 min before meals: < 150 - None, 151-200 - 2 u, 201-250 - 4 u, 251-300 - 6 u, 301-350 - 8 u, 351-400 - 10 u, >400 - 12 u  
insulin syringe,safetyneedle 1 mL 30 gauge x 5/16\" syrg     No No  
Sig: As directed  
levothyroxine (SYNTHROID) 100 mcg tablet     No No  
Sig: Take 1 Tab by mouth Daily (before breakfast). Indications: hypothyroidism Patient taking differently: Take 75 mcg by mouth Daily (before breakfast). Indications: a condition with low thyroid hormone levels  
metOLazone (ZAROXOLYN) 2.5 mg tablet     No No  
Sig: Take 1 Tab by mouth every fourty-eight (48) hours. metaxalone (SKELAXIN) 800 mg tablet     No No  
Sig: Take 1 Tab by mouth four (4) times daily for 5 days. Indications: muscle spasm  
oxybutynin (DITROPAN) 5 mg tablet     Yes No  
Sig: Take 5 mg by mouth two (2) times a day. polyethylene glycol (MIRALAX) 17 gram/dose powder     No No  
Sig: Take 17 g by mouth daily. 1 tablespoon with 8 oz of water daily  
predniSONE (DELTASONE) 20 mg tablet     No No  
Sig: Take 60 mg by mouth daily (with breakfast). 60 MG DAILY X 3 DAYS, THEN 40 MG DAILY X 3 DAYS, THEN 20 MG DAILY X 3 DAYS  
promethazine (PHENERGAN) 25 mg tablet     No No  
Sig: Take 1 Tab by mouth every six (6) hours as needed for Nausea (and withdrawl symptoms). rosuvastatin (CRESTOR) 5 mg tablet     No No  
Sig: TAKE ONE TABLET NIGHTLY  
sertraline (ZOLOFT) 100 mg tablet     Yes No  
Sig: Take 50 mg by mouth daily. Indications: Bipolar Depression  
traZODone (DESYREL) 100 mg tablet     Yes No  
Sig: Take 100 mg by mouth as needed. Indications: major depressive disorder  
umeclidinium-vilanterol (ANORO ELLIPTA) 62.5-25 mcg/actuation inhaler     Yes No  
Sig: Take 1 Puff by inhalation daily.   
  
Facility-Administered Medications: None  
  
 
 Transfer Medications (from the transfer summary prepared by Dr. Demetria Tanner): Prior to Admission Medications Prescriptions Last Dose Informant Patient Reported? Taking? ARIPiprazole (ABILIFY) 5 mg tablet   Yes No  
Sig: Take 10 mg by mouth daily. Indications: BIPOLAR DISORDER IN REMISSION  
BD INSULIN SYRINGE ULTRA-FINE 1 mL 31 gauge x 5/16 syrg   Yes No  
Nebulizer Accessories Kit   No No  
Sig: Use with nebulizer machine Oxygen-Air Delivery Systems   Yes No  
Sig: by Nasal route continuous. 2 LPM via NC  Indications: Hypoxemia requiring supplementary oxygen  
acetaminophen (TYLENOL) 325 mg tablet   Yes No  
Sig: Take  by mouth every four (4) hours as needed for Pain. albuterol (PROAIR HFA) 90 mcg/actuation inhaler   No No  
Sig: INHALE 2 PUFFS EVERY 4 HOURS AS NEEDED FOR WHEEZING  
albuterol (PROVENTIL VENTOLIN) 2.5 mg /3 mL (0.083 %) nebulizer solution   No No  
Sig: USE 1 NEB EVERY 4 HOURS FOR WHEEZING AND SHORTNESS OF BREATH  
allopurinol (ZYLOPRIM) 100 mg tablet   Yes No  
Sig: Take 100 mg by mouth daily. aspirin 81 mg chewable tablet   No No  
Sig: Take 1 Tab by mouth daily. cholecalciferol (VITAMIN D3) 1,000 unit tablet   No No  
Sig: Take 2 Tabs by mouth daily. Indications: PREVENTION OF VITAMIN D DEFICIENCY  
divalproex DR (DEPAKOTE) 250 mg tablet   Yes No  
Sig: Take 250 mg by mouth nightly. Indications: BIPOLAR DISORDER IN REMISSION  
docusate sodium (COLACE) 100 mg capsule   No No  
Sig: Take 1 Cap by mouth two (2) times a day. famotidine (PEPCID) 20 mg tablet   No No  
Sig: Take 1 Tab by mouth daily. Indications: Stress Ulcer Prevention  
famotidine (PEPCID) 20 mg tablet   No No  
Sig: TAKE 1 TABLET BY MOUTH ONCE DAILY ferrous sulfate 325 mg (65 mg iron) tablet   No No  
Sig: Take 1 Tab by mouth daily (with breakfast). insulin glargine (LANTUS) 100 unit/mL injection   No No  
Si Units by SubCUTAneous route daily. Indications: type 2 diabetes mellitus insulin lispro (HUMALOG) 100 unit/mL injection   No No  
Sig: To be given 15 min before meals: < 150 - None, 151-200 - 2 u, 201-250 - 4 u, 251-300 - 6 u, 301-350 - 8 u, 351-400 - 10 u, >400 - 12 u  
insulin syringe,safetyneedle 1 mL 30 gauge x \" syrg   No No  
Sig: As directed  
levothyroxine (SYNTHROID) 100 mcg tablet   No No  
Sig: Take 1 Tab by mouth Daily (before breakfast). Indications: hypothyroidism Patient taking differently: Take 75 mcg by mouth Daily (before breakfast). Indications: a condition with low thyroid hormone levels  
methadone (DOLOPHINE) 5 mg tablet   No No  
Sig: Take 4 Tabs by mouth daily. Max Daily Amount: 20 mg.  
multivitamin, tx-iron-ca-min (THERA-M W/ IRON) 9 mg iron-400 mcg tab tablet   No No  
Sig: Take 1 Tab by mouth daily. nicotine (NICODERM CQ) 14 mg/24 hr patch   No No  
Si Patch by TransDERmal route daily for 30 days. oxyCODONE-acetaminophen (PERCOCET) 5-325 mg per tablet   No No  
Sig: Take 1-2 Tabs by mouth every six (6) hours as needed for Pain for up to 3 days. Max Daily Amount: 8 Tabs. oxybutynin (DITROPAN) 5 mg tablet   Yes No  
Sig: Take 5 mg by mouth two (2) times a day. polyethylene glycol (MIRALAX) 17 gram/dose powder   No No  
Sig: Take 17 g by mouth daily. 1 tablespoon with 8 oz of water daily  
predniSONE (DELTASONE) 10 mg tablet   No No  
Sig: Take 30 mg by mouth daily (with breakfast). Indications: sarcoidosis  
promethazine (PHENERGAN) 25 mg tablet   No No  
Sig: Take 1 Tab by mouth every six (6) hours as needed for Nausea (and withdrawl symptoms). rosuvastatin (CRESTOR) 5 mg tablet   No No  
Sig: TAKE ONE TABLET NIGHTLY  
senna-docusate (PERICOLACE) 8.6-50 mg per tablet   No No  
Sig: Take 1 Tab by mouth daily. sertraline (ZOLOFT) 100 mg tablet   Yes No  
Sig: Take 50 mg by mouth daily. Indications: Bipolar Depression  
traZODone (DESYREL) 100 mg tablet   Yes No  
Sig: Take 100 mg by mouth as needed. Indications: major depressive disorder umeclidinium-vilanterol (ANORO ELLIPTA) 62.5-25 mcg/actuation inhaler   Yes No  
Sig: Take 1 Puff by inhalation daily. Facility-Administered Medications: None Review Of Systems:  
CONSTITUTIONAL: No weight loss. EYES: No blurred vision and no eye discharge. ENT: No nasal discharge. No ear pain. CARDIOVASCULAR: No chest pain and no diaphoresis. RESPIRATORY: No cough, no hemoptysis. GI: No vomiting, no diarrhea : No urinary frequency and no dysuria. MUSCULOSKELETAL: Significant for acute postoperative musculoskeletal pain. SKIN: No rashes. NEURO: As above. ENDOCRINE: No polyphagia and no polydipsia. HEMATOLOGY: Significant for acute postoperative blood loss anemia. Objective:  
 
Vital Signs: 
Patient Vitals for the past 24 hrs: 
 BP Temp Pulse Resp SpO2  
12/17/19 1558 96/62 99.9 °F (37.7 °C) 83 18 94 % 12/17/19 0751 114/63 98.4 °F (36.9 °C) 67 18 99 % 12/17/19 0027 112/69 98.8 °F (37.1 °C) 72 18 97 % There is no height or weight on file to calculate BMI. Physical Examination: 
GENERAL SURVEY: Patient is awake, alert, oriented x 3, sitting comfortably on the chair, not in acute respiratory distress. HEENT: pale palpebral conjunctivae, anicteric sclerae, no nasoaural discharge, moist oral mucosa NECK: supple, no jugular venous distention, no palpable lymph nodes CHEST/LUNGS: symmetrical chest expansion, good air entry, clear breath sounds HEART: adynamic precordium, good S1 S2, no S3, regular rhythm, no murmurs ABDOMEN: obese, bowel sounds appreciated, soft, non-tender EXTREMITIES: pale nailbeds, no edema, full and equal pulses, no calf tenderness NEUROLOGICAL EXAM: The patient is awake, alert and oriented x3, able to answer questions fairly appropriately, able to follow 1 and 2 step commands. Able to tell time from the wall clock. Cranial nerves II-XII are grossly intact. No gross sensory deficit.   Motor strength is 4-/5 on BUE, 2+/5 on the RLE (except for 1/5 on the right ankle), 2-/5 on the LLE (except for 2+/5 on the left knee). Incision(s): healing well, clean, dry, and intact and serosanguineous Current Medications: 
Current Facility-Administered Medications Medication Dose Route Frequency  acetaminophen (TYLENOL) tablet 650 mg  650 mg Oral Q4H PRN  
 bisacodyl (DULCOLAX) tablet 10 mg  10 mg Oral Q48H PRN  
 ferrous sulfate tablet 325 mg  1 Tab Oral DAILY WITH BREAKFAST  ascorbic acid (vitamin C) (VITAMIN C) tablet 250 mg  250 mg Oral DAILY WITH BREAKFAST  insulin lispro (HUMALOG) injection   SubCUTAneous TIDAC  nicotine (NICODERM CQ) 14 mg/24 hr patch 1 Patch  1 Patch TransDERmal DAILY  senna-docusate (PERICOLACE) 8.6-50 mg per tablet 2 Tab  2 Tab Oral PCD  methadone (DOLOPHINE) tablet 20 mg  20 mg Oral DAILY  oxyCODONE-acetaminophen (PERCOCET 10)  mg per tablet 1 Tab  1 Tab Oral Q4H PRN  predniSONE (DELTASONE) tablet 30 mg  30 mg Oral DAILY WITH BREAKFAST  insulin glargine (LANTUS) injection 20 Units  20 Units SubCUTAneous ACB  famotidine (PEPCID) tablet 20 mg  20 mg Oral BID  rosuvastatin (CRESTOR) tablet 5 mg  5 mg Oral QHS  albuterol (PROVENTIL VENTOLIN) nebulizer solution 2.5 mg  2.5 mg Nebulization Q4H PRN  
 levothyroxine (SYNTHROID) tablet 75 mcg  75 mcg Oral 6am  
 cholecalciferol (VITAMIN D3) (400 Units /10 mcg) tablet 5 Tab  2,000 Units Oral DAILY  calcium citrate tablet 200 mg [elemental]  200 mg Oral BID  divalproex DR (DEPAKOTE) tablet 500 mg  500 mg Oral QHS  fluticasone-vilanterol (BREO ELLIPTA) 100mcg-25mcg/puff  1 Puff Inhalation DAILY  oxybutynin (DITROPAN) tablet 5 mg  5 mg Oral BID  allopurinol (ZYLOPRIM) tablet 100 mg  100 mg Oral DAILY  ARIPiprazole (ABILIFY) tablet 10 mg  10 mg Oral DAILY  traZODone (DESYREL) tablet 100 mg  100 mg Oral QHS PRN  
 sertraline (ZOLOFT) tablet 50 mg  50 mg Oral DAILY  methocarbamol (ROBAXIN) tablet 500 mg  500 mg Oral Q8H  
 aspirin chewable tablet 81 mg  81 mg Oral DAILY WITH BREAKFAST  docusate sodium (COLACE) capsule 100 mg  100 mg Oral DAILY AFTER BREAKFAST Functional Assessment:  
 
Occupational Therapy Prior Level of Function  Pre-Admission Screen  Post-Admission Evaluation Eating Independent Eating Supervision Eating Functional Level: 5 Grooming Independent Grooming Supervision Grooming Functional Level: 4 Upper Body Dressing Independent Upper Body Dressing Supervision Upper Body Dressing Functional Level: 3 Lower Body Dressing Independent Lower Body Dressing Dependent Lower Body Dressing Functional Level: 1 Bladder Management Independent Bladder Management Dependent Toileting Functional Level: 1 Bowel Management Independent Bowel Management Dependent Physical Therapy Prior Level of Function  Pre-Admission Screen  Post-Admission Evaluation Ambulation Independent Ambulation Not evaluated Gait Amount of Assistance: 0 (Not tested) Distance (ft): 0 Feet (ft) Assistive Device: (N/A) Bed Mobility Independent Bed Mobility Moderate Assist Bed/Mat Mobility Rolling Right : 2 (Maximal assistance) Rolling Left : 2 (Maximal assistance) Supine to Sit : 2 (Maximal assistance) Sit to Supine : 2 (Maximal assistance) Supine to Sit Independent Supine to Sit Dependent Bed/Mat Mobility Rolling Right : 2 (Maximal assistance) Rolling Left : 2 (Maximal assistance) Supine to Sit : 2 (Maximal assistance) Sit to Supine : 2 (Maximal assistance) Sit to Stand Independent Sit to Stand Dependent Bed/Mat Mobility Rolling Right : 2 (Maximal assistance) Rolling Left : 2 (Maximal assistance) Supine to Sit : 2 (Maximal assistance) Sit to Supine : 2 (Maximal assistance) Bed/Chair Transfers Independent Bed/Chair Transfers Dependent Transfers Transfer Type: Lateral with transfer board Transfer Assistance : 1 (Total assistance)(Max A x 2 for safety, sequencing, appropriate ant trunk ) Sit to Stand Assistance: Total assistance Car Transfers: Not tested Car Type: N/A Toilet Transfers Independent Toilet Transfers Dependent Toilet Transfers Toilet Transfer Score: 0 Speech and Language Pathology Post-Admission Evaluation Comprehension (Native Language) Primary Mode of Comprehension: Auditory Score: 5 Comments: occasional repetition needed Expression (Native Language) Primary Mode of Expression: Verbal 
Score: 6 Social Interaction/Pragmatics Score: 5 Comments: re-direction to task at times Problem Solving Score: 4 Comments: min cues for following spinal precautions Memory Score: 5 Legend: 
 7 - Independent 6 - Modified Independent 5 - Standby Assistance / Supervision / Kell  4 - Minimum Assistance / [address] 3 - Moderate Assistance 2 - Maximum Assistance 1 - Total Assistance / Dependent Labs on Admission: 
Recent Results (from the past 24 hour(s)) GLUCOSE, POC Collection Time: 12/16/19 11:04 PM  
Result Value Ref Range Glucose (POC) 200 (H) 70 - 110 mg/dL RETICULOCYTE COUNT Collection Time: 12/17/19  6:02 AM  
Result Value Ref Range Reticulocyte count 2.5 (H) 0.5 - 2.3 % CBC WITH AUTOMATED DIFF Collection Time: 12/17/19  6:02 AM  
Result Value Ref Range WBC 12.7 4.6 - 13.2 K/uL  
 RBC 2.73 (L) 4.20 - 5.30 M/uL HGB 7.4 (L) 12.0 - 16.0 g/dL HCT 23.1 (L) 35.0 - 45.0 % MCV 84.6 74.0 - 97.0 FL  
 MCH 27.1 24.0 - 34.0 PG  
 MCHC 32.0 31.0 - 37.0 g/dL  
 RDW 15.1 (H) 11.6 - 14.5 % PLATELET 633 303 - 422 K/uL MPV 8.8 (L) 9.2 - 11.8 FL  
 NEUTROPHILS 60 40 - 73 % LYMPHOCYTES 32 21 - 52 % MONOCYTES 7 3 - 10 % EOSINOPHILS 1 0 - 5 % BASOPHILS 0 0 - 2 %  
 ABS. NEUTROPHILS 7.7 1.8 - 8.0 K/UL  
 ABS. LYMPHOCYTES 4.1 (H) 0.9 - 3.6 K/UL  
 ABS. MONOCYTES 0.8 0.05 - 1.2 K/UL ABS. EOSINOPHILS 0.1 0.0 - 0.4 K/UL  
 ABS. BASOPHILS 0.0 0.0 - 0.1 K/UL  
 DF AUTOMATED MAGNESIUM Collection Time: 12/17/19  6:02 AM  
Result Value Ref Range Magnesium 1.9 1.6 - 2.6 mg/dL METABOLIC PANEL, BASIC Collection Time: 12/17/19  6:02 AM  
Result Value Ref Range Sodium 138 136 - 145 mmol/L Potassium 4.6 3.5 - 5.5 mmol/L Chloride 99 (L) 100 - 111 mmol/L  
 CO2 34 (H) 21 - 32 mmol/L Anion gap 5 3.0 - 18 mmol/L Glucose 109 (H) 74 - 99 mg/dL BUN 14 7.0 - 18 MG/DL Creatinine 0.68 0.6 - 1.3 MG/DL  
 BUN/Creatinine ratio 21 (H) 12 - 20 GFR est AA >60 >60 ml/min/1.73m2 GFR est non-AA >60 >60 ml/min/1.73m2 Calcium 8.3 (L) 8.5 - 10.1 MG/DL  
GLUCOSE, POC Collection Time: 12/17/19  8:45 AM  
Result Value Ref Range Glucose (POC) 112 (H) 70 - 110 mg/dL GLUCOSE, POC Collection Time: 12/17/19 12:05 PM  
Result Value Ref Range Glucose (POC) 178 (H) 70 - 110 mg/dL GLUCOSE, POC Collection Time: 12/17/19  4:58 PM  
Result Value Ref Range Glucose (POC) 271 (H) 70 - 110 mg/dL Estimated Glomerular Filtration Rate: On admission, estimated GFR based on a Creatinine of 0.68 mg/dl: 
 Using CKD-EPI = 107.9 mL/min/1.73m2 Using MDRD = 112.4 mL/min/1.73m2 Assessment:  
 
Primary Rehabilitation Diagnosis 1. Impaired Mobility and ADLs 2. S/P T10, T9, T8 laminectomy; T7, T8, T9, T10, T11, T12 posterior thoracic fusion; segmental spinal instrumentation; K2M [place] type, T7-T8, T11-T12 (12/11/2019 - Dr. Aquiles Arvizu) 3. History of acute compression fractures of body of thoracic vertebrae (T9 and T10) due to fall Comorbidities  Type 2 diabetes with stage 3 chronic kidney disease GFR 30-59  E11.22, N18.3  Diabetic neuropathy associated with type 2 diabetes mellitus  E11.40  Venous insufficiency I87.2  Obesity, Class I, BMI 30-34.9 E66.9  Chronic back pain M54.9, G89.29  
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  
 Anxiety F41.9  Wears glasses Z97.3  History of hepatitis C Z86.19  
 Sickle cell trait  D57.3  History of acute renal failure Z87.448  Hypothyroidism E03.9  Recurrent genital herpes A60.00  Sarcoidosis D86.9  Abscess of right arm L02.413  Hypoxemia requiring supplemental oxygen R09.02, Z99.81  
 Abnormal nuclear stress test R94.39  
 Cellulitis and abscess of hand L03.119, L02.519  
 Cellulitis and abscess of foot L03.119, L02.619  
 Cellulitis of arm, right L03. 113  
 Olecranon bursitis of right elbow M70.21  
 Contusion of left elbow S50. 02XA  Intravenous drug user F19.90  Right Achilles tendinitis M76.61  
 History of penicillin allergy Z88.0  Falls frequently R29.6  Gastroesophageal reflux disease with hiatal hernia K21.9, K44.9  Memory difficulty R41.3  Mixed connective tissue disease  M35.1  Hyperuricemia E79.0  History of vitamin D deficiency Z86.39  
 Urge urinary incontinence N39.41  
 Acute blood loss as cause of postoperative anemia D62  
 History of fracture due to fall Z87.81  Chronic respiratory failure with hypoxia J96.11  
 Cellulitis of right forearm L03. 113  
 Gout M10.9  Menopause Z78.0  Long term current use of aspirin Z79.82  
 Opioid dependence  F11.20  Tobacco use disorder F17.200 Willingness to participate in the program: Good Rehabilitation Potential: Good Plan: 1. Medical Issues being followed closely: 
  [x]  Fall and safety precautions [x]  Wound Care  
  [x]  Bowel and Bladder Function  
  [x]  Fluid Electrolyte and Nutrition Balance [x]  Pain Control 2. Issues that 24 hour rehabilitation nursing is following: 
  [x]  Fall and safety precautions [x]  Wound Care  
  [x]  Bowel and Bladder Function  
  [x]  Fluid Electrolyte and Nutrition Balance [x]  Pain Control   
  [x]  Assistance with and education on in-room safety with transfers to and from the bed, wheelchair, toilet and shower. 3. Acute rehabilitation plan of care: 
  [x]  Patient to be evaluated and treated by:  
        [x]  Physical Therapy [x]  Occupational Therapy  
        []  Speech Therapy  
  []  Hold Rehab until further notice 5. Medications: 
  [x]  MAR Reviewed [x]  Continue Present Medications 6. DVT Prophylaxis:   
  []  Lovenox  
  []  Unfractionated Heparin  
  []  Coumadin  
  []  NOAC [x]  ROBERT Stockings  
  [x]  Sequential Compression Device []  None 7. Rehabilitation program and expectations from patient, as well as medical issues discussed with the patient. REHABILITATION PLAN:   
1. The patient is being admitted to a comprehensive acute inpatient rehabilitation program consisting of at least 180 minutes a day, 5 out of 7 days a week of  combined physical and occupational therapy, and close supervision by a physician with special training and experience in rehabilitation medicine. 2. The patient's prognosis for significant practical improvement within a reasonable period of time appears to be good. 3. The estimated length of stay is 16 days. 4. The patient/family has a good understanding of our discharge process.  The patient has potential to make improvement and is in need of at least two of the following multidisciplinary therapies including but not limited to physical, occupational, speech, nutritional services, wound care, prosthetics and orthotics. The patient is expected to be able to return to home with home health therapy and family support. 5. Given the patient's multiple co-morbidities and risk for further medical complications, rehabilitation services could not be safely provided at a lower level of care such as a skilled nursing facility. 6. Physical therapy for therapeutic exercise, progressive mobility, gait training, transfer training, bed mobility training, patient and family education, and wheelchair mobility training. Physical therapy goals to address  extremity function, range of motion, balance, safety awareness, independence in transfers, activity tolerance, independence in bed mobility, and independence in ambulation. 7. Occupational therapy for self-care home management, transfer training, therapeutic exercise, activity, wheelchair mobility training. Occupational therapy goals to address  extremity function, cognition, balance, activity tolerance, independence in functional transfers, range of motion, safety awareness, independence in ADL  and independence in home management skills. 8. Specialized 24 hour rehabilitation nursing care for bowel and bladder retraining, disease management, pain management, pressure ulcer prevention and management per policy, education on pressure relief techniques, embolism prevention, nutrition management, hydration management, transfer training and  
medication distribution. 9. Nutrition and Dietary services will be obtained for assessment of adequate calorie needs, hydration and calorie counts as appropriate. 10. Therapeutic recreation for leisure skills. 6. Rehab psychology for coping skills. 12. Social work services for patient and family counseling and safe discharge planning.   
13. I will be in charge of the inpatient rehab program. Full details of the inpatient rehab program will be outlined in the initial team conference. REHABILITATION GOALS:  Improve functional and activities of daily living skills in order to return back to independent living with family support. MEDICAL PLAN: 
> S/P T10, T9, T8 laminectomy; T7, T8, T9, T10, T11, T12 posterior thoracic fusion; segmental spinal instrumentation; K2M [place] type, T7-T8, T11-T12 (12/11/2019 - Dr. Hyacinth Medina); History of acute compression fractures of body of thoracic vertebrae (T9 and T10) due to fall 
 > Thoracic spinal precautions 
 > Calcium citrate 200 mg PO BID 
 > Cholecalciferol 2,000 units PO once daily 
 
> Acute Postoperative Blood Loss Anemia 
 > Anemia work-up (12/13/2019) showed serum iron 11, TIBC 215, iron % saturation 5, ferritin 146 
 > Hgb/Hct (12/17/2019, on admission) = 7.4/23.1 
 > Reticulocyte count (12/17/2019) = 2.5 
 > FeSO4 325 mg PO once daily with breakfast  
 > Ascorbic Acid 250 mg PO once daily with breakfast (to enhance the absorption of the FeSO4)  
 
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 
 > Prior to admission, the patient stated she was on Metolazone 2.5 mg PO q Mon-Wed-Fri 
 > Metolazone was not given during the patient's hospital stay at [facility]  
 > Will continue to hold Metolazone for now 
 
> Chronic obstructive pulmonary disease; Chronic respiratory failure with hypoxemia requiring supplemental oxygen (3 LPM) > Fluticasone-Vilanterol 100-25 1 puff inhaled once daily 
 > O2 inhalation at 3 LPM via nasal cannula continuous 
 
> Opioid dependence 
 > Methadone 20 mg PO once daily 
 
> Type 2 diabetes mellitus with stage 3 chronic kidney disease 
 > Insulin glargine 20 mg PO once daily before breakfast 
 > Insulin lispro sliding scale SC TID AC only 
 
> Analgesia 
 > Acetaminophen 650 mg PO q 4 hr PRN for pain level less than 5/10 > Methocarbamol 500 mg PO q 8 hr 
 > Percocet 10/325 1 tab PO q 4 hr PRN for pain level greater than 4/10  
 
> Diet: 
 > Specifications: Cardiac, diabetic consistent carb 1800 kcal, no concentrated sweets 
 > Solids (consistency): Regular  
 > Liquids (consistency): Thin PRECAUTIONS:  
1. Safety/fall precautions. 2. Deep venous thrombosis precautions. 3. Thoracic spinal precautions. POTENTIAL BARRIERS TO DISCHARGE:  
1. Risk for falls. 2. The patient lives alone. 3. Family limited in ability to provide constant care. 4. Limited community resources. RELEVANT CHANGES SINCE PREADMISSION SCREENING: I have compared the patients medical and functional status at the time of the pre-admission screening and on this post-admission evaluation. The preadmission screen and findings from therapy evaluations both support my post admission physician evaluation, deeming this patient to be an appropriate candidate for the IRF. The patient requires multidisciplinary treatment, physician oversight and intensive therapy not provided at a lower level of care. By signing this document, I acknowledge that I have personally performed a full physical examination on this patient within 24 hours of admission to this inpatient rehabilitation facility and have determined the patient to be able to tolerate the above course of treatment at an intensive level for a reasonable period of time. I will be completing a detailed individualized plan of care for this patient by day #4 of the patients stay based upon the Pre-Admission Screen, the Post-Admission Evaluation, and the therapy evaluations. Signed:    Jeniffer Conley MD 
  December 17, 2019

## 2019-12-17 NOTE — PROGRESS NOTES
NUTRITION Nutrition Consult: General Nutrition Management & Supplements RECOMMENDATIONS / PLAN:  
 
- Add supplement: Glucerkiya Alaske, once daily. 
- Monitor and encourage po/supplement intake. - Continue RD inpatient monitoring and evaluation. NUTRITION INTERVENTIONS & DIAGNOSIS:  
 
- Meals/snacks: modify composition - Medical food supplement therapy: initiate Nutrition Diagnosis: Predicted inadequate energy intake related to appetite as evidenced by pt with fair meal intake (about 50%) prior to and since admission ASSESSMENT:  
 
S/p laminectomy with spinal fusion on 12/11 and transferred to ARU. Reports fair intake, consuming around 50% of meals, during recent hospitalization. Noted significant weight loss however pt reports this was intentional. States she lost the weight \"to help control her diabetes. \" Fair intake since admission & tolerating diet. BG levels in good control. Nutritional intake adequate to meet patients estimated nutritional needs:  Unable to determine at this time Diet: DIET CARDIAC Regular; Consistent Carb 1800kcal; No Conc. Sweets Food Allergies: NKFA Current Appetite: Fair Appetite/meal intake prior to admission: Fair 50% of meals provided Feeding Limitations:  [] Swallowing difficulty    [] Chewing difficulty    [] Other: 
Current Meal Intake: No data found. BM: 12/17- soft Skin Integrity: wound & incision to lower back Edema:   [x] No     [] Yes Pertinent Medications: Reviewed: abilify, ascorbic acid, prn dulcolax, calcium citrate, cholecalciferol, colace, famotidine, ferrous sulfate, lantus (20 units), SSI, methadone, steroid, pericolace Recent Labs 12/17/19 
0602   
K 4.6 CL 99* CO2 34* * BUN 14  
CREA 0.68  
CA 8.3*  
MG 1.9 No intake or output data in the 24 hours ending 12/17/19 1220 Anthropometrics: 
Ht Readings from Last 1 Encounters:  
12/12/19 5' 5\" (1.651 m) There were no vitals filed for this visit. There is no height or weight on file to calculate BMI.   29.68 kg/(m^2) using previous wt from 12/14/19 Weight History: Pt reports intended weight loss with a previous wt of 230# x a few years ago. Weight Metrics 12/14/2019 12/10/2019 12/7/2019 11/29/2019 11/12/2019 10/30/2019 10/17/2019 Weight 178 lb 9.6 oz - 180 lb 180 lb 180 lb 6.4 oz 175 lb 175 lb BMI - 29.72 kg/m2 29.95 kg/m2 29.95 kg/m2 30.02 kg/m2 29.12 kg/m2 29.12 kg/m2 Some encounter information is confidential and restricted. Go to Review Flowsheets activity to see all data. Admitting Diagnosis: Status post laminectomy with spinal fusion [Z98.1] Pertinent PMHx: COPD, CKD stage 3, DM with neuropathy, GERD, hepatitis C, IV drug user, sarcoidosis Education Needs:        [x] None identified  [] Identified - Not appropriate at this time  []  Identified and addressed - refer to education log Learning Limitations:   [x] None identified  [] Identified Cultural, Druze & ethnic food preferences:  [x] None identified    [] Identified and addressed ESTIMATED NUTRITION NEEDS:  
 
Calories: [address] kcal (MSJx1.2-1.3) based on  [x] Actual BW: 81 kg      [] IBW Protein: 65-81 gm (0.8-1 gm/kg) based on  [x] Actual BW      [] IBW Fluid: 1 mL/kcal 
  
MONITORING & EVALUATION:  
 
Nutrition Goal(s):  
- PO nutrition intake will meet >75% of patient estimated nutritional needs within the next 7 days. Outcome: New/Initial goal  
 
Monitoring:   [x] Food and nutrient intake   [x] Food and nutrient administration  [x] Comparative standards   [x] Nutrition-focused physical findings   [x] Anthropometric Measurements   [x] Treatment/therapy   [x] Biochemical data, medical tests, and procedures Previous Recommendations (for follow-up assessments only):  Not Applicable Discharge Planning: Nutritional discharge needs unknown at this time. Participated in care planning, discharge planning, & interdisciplinary rounds as appropriate. Magalys Snow, ANDIE Pager: [phone]

## 2019-12-17 NOTE — PROGRESS NOTES
conducted an initial consultation and Spiritual Assessment for Stephen iSnha, who is a 62 y.o.,female. Patient's Primary Language is: [place]. According to the patient's EMR Mandaen Affiliation is: [place]. The reason the Patient came to the hospital is:  
Patient Active Problem List  
 Diagnosis Date Noted  Bipolar affective disorder ([address] 12/05/2012 Priority: 1 - One  Chronic respiratory failure with hypoxia ([address]  Gout  Menopause  Long term current use of aspirin  Opioid dependence ([address]  Tobacco use disorder  Acute blood loss as cause of postoperative anemia 12/12/2019  Impaired mobility and ADLs 12/11/2019  Spinal cord compression ([address] 12/11/2019  Compression fracture of body of thoracic vertebra ([facility]) 12/11/2019  Status post laminectomy with spinal fusion 12/11/2019  
 History of fracture due to fall 12/11/2019  Hyperuricemia 05/26/2017  CKD stage G3a/A1, GFR 45-59 and albumin creatinine ratio <30 mg/g ([facility]) 05/11/2017  Mixed connective tissue disease ([address] 05/10/2017  History of vitamin D deficiency 05/10/2017  Urge urinary incontinence 05/10/2017  History of penicillin allergy  Falls frequently  Gastroesophageal reflux disease with hiatal hernia  Memory difficulty  Cellulitis of arm, right 05/04/2017  Olecranon bursitis of right elbow 05/04/2017  Contusion of left elbow 05/04/2017  Cellulitis of right forearm 05/04/2017  Intravenous drug user 05/02/2017  Right Achilles tendinitis 05/02/2017  Cellulitis and abscess of hand 04/13/2017  Cellulitis and abscess of foot 04/13/2017  Abnormal nuclear stress test 11/17/2016  Hypoxemia requiring supplemental oxygen 12/29/2014  Abscess of right arm 06/03/2013  History of acute renal failure 05/31/2013  Recurrent genital herpes 05/31/2013  Hypothyroidism  Sarcoidosis  Diastolic dysfunction without heart failure  Chronic obstructive pulmonary disease (COPD) [facility]  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   
 Sickle cell trait ([facility]  Abnormally low high density lipoprotein (HDL) cholesterol with hypertriglyceridemia  Generalized osteoarthritis of multiple sites  Type 2 diabetes with stage 3 chronic kidney disease GFR 30-59 (HCC)  Diabetic neuropathy associated with type 2 diabetes mellitus ([facility]  Venous insufficiency  Obesity, Class I, BMI 30-34.9  Chronic back pain The  provided the following Interventions: 
Initiated a relationship of care and support. Explored issues of agueda, belief, spirituality and Hoahaoism/ritual needs while hospitalized. Listened empathically. Provided chaplaincy education. Provided information about Spiritual Care Services. Offered prayer and assurance of continued prayers on patient's behalf. Chart reviewed. The following outcomes where achieved: 
Patient shared limited information about both their medical narrative and spiritual journey/beliefs.  confirmed Patient's Orthodoxy Affiliation. Patient processed feeling about current hospitalization. Patient expressed gratitude for 's visit. Assessment: 
Patient does not have any Hoahaoism/cultural needs that will affect patient's preferences in health care. There are no spiritual or Hoahaoism issues which require intervention at this time. Plan: 
Chaplains will continue to follow and will provide pastoral care on an as needed/requested basis.  recommends bedside caregivers page  on duty if patient shows signs of acute spiritual or emotional distress. [address] Spiritual Care  
[phone]

## 2019-12-17 NOTE — PROGRESS NOTES
SHIFT CHANGE NOTE FOR Yolie Cezar Bedside and Verbal shift change report given to Bhaskar Aguirre RN (oncoming nurse) by Jessica Alcazar RN 
(offgoing nurse). Report included the following information SBAR, Kardex, MAR and Recent Results. Situation: 
 Code Status: Full Code Reason for Admission: Spinal Thoracic laminectomy T 6-T 11 Hospital Day: 1 Problem List:  
Hospital Problems  Date Reviewed: 11/12/2019 Codes Class Noted POA Chronic respiratory failure with hypoxia (HCC) (Chronic) ICD-10-CM: J96.11 
ICD-9-CM: 518.83, 799.02  Unknown Yes Overview Signed 12/16/2019 10:11 PM by Fabby Hickey MD  
  On home oxygen inhalation at 3 LPM via [place] Opioid dependence (HCC) (Chronic) ICD-10-CM: M87.73 ICD-9-CM: 304.00  Unknown Yes Overview Signed 12/16/2019 10:54 PM by Fabby Hickey MD  
  On Methadone Acute blood loss as cause of postoperative anemia ICD-10-CM: D62 
ICD-9-CM: 285.1  12/12/2019 Yes Impaired mobility and ADLs ICD-10-CM: Z74.09 
ICD-9-CM: 799.89  12/11/2019 Yes Spinal cord compression (HCC) ICD-10-CM: G95.20 ICD-9-CM: 336.9  12/11/2019 Yes Compression fracture of body of thoracic vertebra (HCC) ICD-10-CM: S22.000A ICD-9-CM: 805.2  12/11/2019 Yes * (Principal) Status post laminectomy with spinal fusion ICD-10-CM: Z98.1 ICD-9-CM: V45.4  12/11/2019 Yes Overview Signed 12/16/2019 10:05 PM by Fabby Hickey MD  
  S/P T10, T9, T8 laminectomy; T7, T8, T9, T10, T11, T12 posterior thoracic fusion; segmental spinal instrumentation; K2M [place] type, T7-T8, T11-T12 (12/11/2019 - Dr. Karol Gregg) History of fracture due to fall ICD-10-CM: Z87.81 ICD-9-CM: V15.51  12/11/2019 Yes Hypoxemia requiring supplemental oxygen (Chronic) ICD-10-CM: R09.02, Z99.81 ICD-9-CM: 799.02  12/29/2014 Yes Chronic obstructive pulmonary disease (COPD) (HCC) (Chronic) ICD-10-CM: J44.9 ICD-9-CM: 418  Unknown Yes Overview Signed 4/23/2013 10:01 AM by Paul CALLES  
  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 
 Past Medical History:  
Past Medical History:  
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  
 CKD stage G3a/A1, GFR 45-59 and albumin creatinine ratio <30 mg/g ([facility]) 5/11/2017 Urine microalbumin/Creatinine ratio (5/11/2017) = 9 mg/g  Contusion of left elbow 5/4/2017  Depression  Diabetic neuropathy associated with type 2 diabetes mellitus ([address]  Diastolic dysfunction without heart failure Stable on diuretics  Falls frequently  Gastroesophageal reflux disease with hiatal hernia  Generalized osteoarthritis of multiple sites  Gout On Allopurinol  History of acute renal failure 5/31/2013  History of back injury   
 jumped out of second story window  History of fracture due to fall 12/11/2019  
 History of hepatitis C   
 treated  History of penicillin allergy  History of vitamin D deficiency 5/10/2017  Hyperuricemia 5/26/2017  Hypothyroidism  Hypoxemia requiring supplemental oxygen 12/29/2014  Intravenous drug user 5/2/2017  Long term current use of aspirin  Memory difficulty  Menopause  Mixed connective tissue disease ([facility] 5/10/2017  Obesity, Class I, BMI 30-34.9  Olecranon bursitis of right elbow 5/4/2017  Opioid dependence ([facility] On Methadone  Recurrent genital herpes 5/31/2013  Right Achilles tendinitis 5/2/2017  Sarcoidosis  Sickle cell trait ([facility]  Tobacco use disorder  Type 2 diabetes with stage 3 chronic kidney disease GFR 30-59 ([facility])  Urge urinary incontinence 5/10/2017  Venous insufficiency  Wears glasses Patient taking anticoagulants no Patient has a defibrillator: no  
 
Assessment: 
? Changes in Assessment throughout shift: None ? Patient has central line: no Reasons if yes: Removed 12/16/19 Insertion date:n/a Last dressing date:n/a 
? Patient has Renae Cath: no Reasons if yes: n/a Insertion date:n/a 
 
? Last Vitals: 
  
Vitals:  
 12/17/19 7632 12/17/19 7482 BP: 112/69 114/63 Pulse: 72 67 Resp: 18 18 Temp: 98.8 °F (37.1 °C) 98.4 °F (36.9 °C) SpO2: 97% 99% LMP: 11/25/2012  
 
 
? PAIN Pain Assessment Pain Intensity 1: 6 (12/17/19 1328) Pain Intensity 1: 2 (12/29/14 1105) Pain Location 1: Back Pain Location 1: Abdomen Pain Intervention(s) 1: Medication (see MAR) Pain Intervention(s) 1: Medication (see MAR) Patient Stated Pain Goal: 0 Patient Stated Pain Goal: 0 
o Intervention effective: yes   
o Other actions taken for pain: no c/o 
 
? Skin Assessment Skin color Skin Color: Appropriate for ethnicity Condition/Temperature Skin Condition/Temp: Dry, Warm Integrity Skin Integrity: Incision (comment) Turgor Turgor: Non-tenting Weekly Pressure Ulcer Documentation  Pressure  Injury Documentation: No Pressure Injury Noted-Pressure Ulcer Prevention Initiated Wound Prevention & Protection Methods Orientation of wound Orientation of Wound Prevention: Posterior Location of Prevention Location of Wound Prevention: Heel, Sacrum/Coccyx, Buttocks Dressing Present Dressing Present : Yes Dressing Status Wound Offloading Wound Offloading (Prevention Methods): Bed, pressure redistribution/air ? INTAKE/OUPUT Date 12/16/19 0700 - 12/17/19 4187 12/17/19 0700 - 12/18/19 7126 Shift 0700-1859 1900-0659 24 Hour Total 0700-1859 1900-0659 24 Hour Total  
INTAKE Shift Total        
OUTPUT Urine Urine Occurrence(s)    2 x  2 x Stool Stool Occurrence(s)    1 x  1 x Shift Total        
NET Weight (kg) Recommendations: 1. Patient needs and requests: met 2. Diet: Cardiac DM Reg Michael Ortega liq 3. Pending tests/procedures: none 4. Functional Level/Equipment: wheelchair 5. Estimated Discharge Date: TBD Posted on Whiteboard in Patients Room: yes [facility] Safety Check A safety check occurred in the patient's room between off going nurse and oncoming nurse listed above. The safety check included the below items Area Items H High Alert Medications ? Verify all high alert medication drips (heparin, PCA, etc.) E Equipment ? Suction is set up for ALL patients (with yanker) ? Red plugs utilized for all equipment (IV pumps, etc.) ? WOWs wiped down at end of shift. ? Room stocked with oxygen, suction, and other unit-specific supplies A Alarms ? Bed alarm is set for fall risk patients ? Ensure chair alarm is in place and activated if patient is up in a chair L Lines ? Check IV for any infiltration ? Renae bag is empty if patient has a Renae ? Tubing and IV bags are labeled Suann Dine Safety ? Room is clean, patient is clean, and equipment is clean. ? Hallways are clear from equipment besides carts. ? Fall bracelet on for fall risk patients ? Ensure room is clear and free of clutter ? Suction is set up for ALL patients (with lashay) ? Hallways are clear from equipment besides carts. ? Isolation precautions followed, supplies available outside room, sign posted

## 2019-12-18 PROBLEM — N18.31 CKD STAGE G3A/A1, GFR 45-59 AND ALBUMIN CREATININE RATIO <30 MG/G (HCC): Status: ACTIVE | Noted: 2017-05-11

## 2019-12-18 NOTE — PROGRESS NOTES
SHIFT CHANGE NOTE FOR Arlyn Escobar Bedside and Verbal shift change report given to Ede Irene LPN (oncoming nurse) by Laurie Grijalva RN (offgoing nurse). Report included the following information SBAR, Kardex, MAR and Recent Results. Situation: 
 Code Status: Full Code Reason for Admission: Spinal Thoracic laminectomy T 6-T 11 Hospital Day: 2 Problem List:  
Hospital Problems  Date Reviewed: 12/17/2019 Codes Class Noted POA Chronic respiratory failure with hypoxia (HCC) (Chronic) ICD-10-CM: J96.11 
ICD-9-CM: 518.83, 799.02  Unknown Yes Overview Signed 12/16/2019 10:11 PM by Sonja Whitaker MD  
  On home oxygen inhalation at 3 LPM via [place] Opioid dependence (HCC) (Chronic) ICD-10-CM: I51.78 ICD-9-CM: 304.00  Unknown Yes Overview Signed 12/16/2019 10:54 PM by Sonja Whitaker MD  
  On Methadone Acute blood loss as cause of postoperative anemia ICD-10-CM: D62 
ICD-9-CM: 285.1  12/12/2019 Yes Impaired mobility and ADLs ICD-10-CM: Z74.09 
ICD-9-CM: 799.89  12/11/2019 Yes Spinal cord compression (HCC) ICD-10-CM: G95.20 ICD-9-CM: 336.9  12/11/2019 Yes Compression fracture of body of thoracic vertebra (HCC) ICD-10-CM: S22.000A ICD-9-CM: 805.2  12/11/2019 Yes * (Principal) Status post laminectomy with spinal fusion ICD-10-CM: Z98.1 ICD-9-CM: V45.4  12/11/2019 Yes Overview Signed 12/16/2019 10:05 PM by Sonja Whitaker MD  
  S/P T10, T9, T8 laminectomy; T7, T8, T9, T10, T11, T12 posterior thoracic fusion; segmental spinal instrumentation; K2M [place] type, T7-T8, T11-T12 (12/11/2019 - Dr. Aquiles Arvizu) History of fracture due to fall ICD-10-CM: Z87.81 ICD-9-CM: V15.51  12/11/2019 Yes Hypoxemia requiring supplemental oxygen (Chronic) ICD-10-CM: R09.02, Z99.81 ICD-9-CM: 799.02  12/29/2014 Yes Chronic obstructive pulmonary disease (COPD) (HCC) (Chronic) ICD-10-CM: J44.9 ICD-9-CM: 145  Unknown Yes Overview Signed 4/23/2013 10:01 AM by Marlys CALLES  
  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 
 Past Medical History:  
Past Medical History:  
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  
 CKD stage G3a/A1, GFR 45-59 and albumin creatinine ratio <30 mg/g ([facility]) 5/11/2017 Urine microalbumin/Creatinine ratio (5/11/2017) = 9 mg/g  Contusion of left elbow 5/4/2017  Depression  Diabetic neuropathy associated with type 2 diabetes mellitus ([address]  Diastolic dysfunction without heart failure Stable on diuretics  Falls frequently  Gastroesophageal reflux disease with hiatal hernia  Generalized osteoarthritis of multiple sites  Gout On Allopurinol  History of acute renal failure 5/31/2013  History of back injury   
 jumped out of second story window  History of fracture due to fall 12/11/2019  
 History of hepatitis C   
 treated  History of penicillin allergy  History of vitamin D deficiency 5/10/2017  Hyperuricemia 5/26/2017  Hypothyroidism  Hypoxemia requiring supplemental oxygen 12/29/2014  Intravenous drug user 5/2/2017  Long term current use of aspirin  Memory difficulty  Menopause  Mixed connective tissue disease ([facility] 5/10/2017  Obesity, Class I, BMI 30-34.9  Olecranon bursitis of right elbow 5/4/2017  Opioid dependence ([facility] On Methadone  Recurrent genital herpes 5/31/2013  Right Achilles tendinitis 5/2/2017  Sarcoidosis  Sickle cell trait ([facility]  Tobacco use disorder  Type 2 diabetes with stage 3 chronic kidney disease GFR 30-59 ([facility])  Urge urinary incontinence 5/10/2017  Venous insufficiency  Wears glasses Patient taking anticoagulants no Patient has a defibrillator: no  
 
Assessment: 
? Changes in Assessment throughout shift: None ? Patient has central line: no Reasons if yes: Removed 12/16/19 Insertion date:n/a Last dressing date:n/a 
? Patient has Renae Cath: no Reasons if yes: n/a Insertion date:n/a 
 
? Last Vitals: 
  
Vitals:  
 12/17/19 0027 12/17/19 0751 12/17/19 1558 12/17/19 2100 BP: 112/69 114/63 96/62 108/61 Pulse: 72 67 83 85 Resp: 18 18 18 18 Temp: 98.8 °F (37.1 °C) 98.4 °F (36.9 °C) 99.9 °F (37.7 °C) 99 °F (37.2 °C) SpO2: 97% 99% 94% 98% LMP: 11/25/2012  
 
 
? PAIN Pain Assessment Pain Intensity 1: 7 (12/18/19 0400) Pain Intensity 1: 2 (12/29/14 1105) Pain Location 1: Back Pain Location 1: Abdomen Pain Intervention(s) 1: Medication (see MAR) Pain Intervention(s) 1: Medication (see MAR) Patient Stated Pain Goal: 0 Patient Stated Pain Goal: 0 
o Intervention effective: yes   
o Other actions taken for pain: no c/o 
 
? Skin Assessment Skin color Skin Color: Appropriate for ethnicity Condition/Temperature Skin Condition/Temp: Dry, Warm Integrity Skin Integrity: Incision (comment), Wound (add Wound LDA) Turgor Turgor: Non-tenting Weekly Pressure Ulcer Documentation  Pressure  Injury Documentation: No Pressure Injury Noted-Pressure Ulcer Prevention Initiated Wound Prevention & Protection Methods Orientation of wound Orientation of Wound Prevention: Posterior Location of Prevention Location of Wound Prevention: Sacrum/Coccyx Dressing Present Dressing Present : No 
Dressing Status Wound Offloading Wound Offloading (Prevention Methods): Bed, pressure redistribution/air, Repositioning ? INTAKE/OUPUT Date 12/17/19 0700 - 12/18/19 9873 12/18/19 0700 - 12/19/19 2248 Shift 3244-0747 9510-5561 24 Hour Total 8574-0587 4537-5448 24 Hour Total  
INTAKE Shift Total        
OUTPUT Urine Urine Occurrence(s) 3 x 4 x 7 x Stool Stool Occurrence(s) 1 x 1 x 2 x Shift Total        
NET Weight (kg) Recommendations: 1. Patient needs and requests: met 2. Diet: Cardiac DM Reg Telma torres 3. Pending tests/procedures: none 4. Functional Level/Equipment: wheelchair 5. Estimated Discharge Date: TBD Posted on Whiteboard in Patients Room: yes [facility] Safety Check A safety check occurred in the patient's room between off going nurse and oncoming nurse listed above. The safety check included the below items Area Items H High Alert Medications ? Verify all high alert medication drips (heparin, PCA, etc.) E Equipment ? Suction is set up for ALL patients (with yanker) ? Red plugs utilized for all equipment (IV pumps, etc.) ? WOWs wiped down at end of shift. ? Room stocked with oxygen, suction, and other unit-specific supplies A Alarms ? Bed alarm is set for fall risk patients ? Ensure chair alarm is in place and activated if patient is up in a chair L Lines ? Check IV for any infiltration ? Renae bag is empty if patient has a Renae ? Tubing and IV bags are labeled Carmelina Issa Safety ? Room is clean, patient is clean, and equipment is clean. ? Hallways are clear from equipment besides carts. ? Fall bracelet on for fall risk patients ? Ensure room is clear and free of clutter ? Suction is set up for ALL patients (with lashay) ? Hallways are clear from equipment besides carts. ? Isolation precautions followed, supplies available outside room, sign posted

## 2019-12-18 NOTE — PROGRESS NOTES
SHIFT CHANGE NOTE FOR Gael Randall Bedside and Verbal shift change report given to Moody Glasgow (oncoming nurse) by Kyle Salvador LPN (offgoing nurse). Report included the following information SBAR, Kardex, MAR and Recent Results. Situation: 
 Code Status: Full Code Reason for Admission: Spinal Thoracic laminectomy T 6-T 11 Hospital Day: 2 Problem List:  
Hospital Problems  Date Reviewed: 12/17/2019 Codes Class Noted POA Chronic respiratory failure with hypoxia (HCC) (Chronic) ICD-10-CM: J96.11 
ICD-9-CM: 518.83, 799.02  Unknown Yes Overview Signed 12/16/2019 10:11 PM by Joel Bill MD  
  On home oxygen inhalation at 3 LPM via [place] Opioid dependence (HCC) (Chronic) ICD-10-CM: C43.92 ICD-9-CM: 304.00  Unknown Yes Overview Signed 12/16/2019 10:54 PM by Joel Bill MD  
  On Methadone Acute blood loss as cause of postoperative anemia ICD-10-CM: D62 
ICD-9-CM: 285.1  12/12/2019 Yes Impaired mobility and ADLs ICD-10-CM: Z74.09 
ICD-9-CM: 799.89  12/11/2019 Yes Spinal cord compression (HCC) ICD-10-CM: G95.20 ICD-9-CM: 336.9  12/11/2019 Yes Compression fracture of body of thoracic vertebra (HCC) ICD-10-CM: S22.000A ICD-9-CM: 805.2  12/11/2019 Yes * (Principal) Status post laminectomy with spinal fusion ICD-10-CM: Z98.1 ICD-9-CM: V45.4  12/11/2019 Yes Overview Signed 12/16/2019 10:05 PM by Joel Bill MD  
  S/P T10, T9, T8 laminectomy; T7, T8, T9, T10, T11, T12 posterior thoracic fusion; segmental spinal instrumentation; K2M [place] type, T7-T8, T11-T12 (12/11/2019 - Dr. Ryan Valenzuela) History of fracture due to fall ICD-10-CM: Z87.81 ICD-9-CM: V15.51  12/11/2019 Yes Hypoxemia requiring supplemental oxygen (Chronic) ICD-10-CM: R09.02, Z99.81 ICD-9-CM: 799.02  12/29/2014 Yes Chronic obstructive pulmonary disease (COPD) (HCC) (Chronic) ICD-10-CM: J44.9 ICD-9-CM: 956  Unknown Yes Overview Signed 4/23/2013 10:01 AM by Clara CALLES  
  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 
 Past Medical History:  
Past Medical History:  
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  
 CKD stage G3a/A1, GFR 45-59 and albumin creatinine ratio <30 mg/g ([facility]) 5/11/2017 Urine microalbumin/Creatinine ratio (5/11/2017) = 9 mg/g  Contusion of left elbow 5/4/2017  Depression  Diabetic neuropathy associated with type 2 diabetes mellitus ([address]  Diastolic dysfunction without heart failure Stable on diuretics  Falls frequently  Gastroesophageal reflux disease with hiatal hernia  Generalized osteoarthritis of multiple sites  Gout On Allopurinol  History of acute renal failure 5/31/2013  History of back injury   
 jumped out of second story window  History of fracture due to fall 12/11/2019  
 History of hepatitis C   
 treated  History of penicillin allergy  History of vitamin D deficiency 5/10/2017  Hyperuricemia 5/26/2017  Hypothyroidism  Hypoxemia requiring supplemental oxygen 12/29/2014  Intravenous drug user 5/2/2017  Long term current use of aspirin  Memory difficulty  Menopause  Mixed connective tissue disease ([facility] 5/10/2017  Obesity, Class I, BMI 30-34.9  Olecranon bursitis of right elbow 5/4/2017  Opioid dependence ([facility] On Methadone  Recurrent genital herpes 5/31/2013  Right Achilles tendinitis 5/2/2017  Sarcoidosis  Sickle cell trait ([facility]  Tobacco use disorder  Type 2 diabetes with stage 3 chronic kidney disease GFR 30-59 ([facility])  Urge urinary incontinence 5/10/2017  Venous insufficiency  Wears glasses Patient taking anticoagulants no Patient has a defibrillator: no  
 
Assessment: 
? Changes in Assessment throughout shift: None ? Patient has central line: no Reasons if yes: Removed 12/16/19 Insertion date:n/a Last dressing date:n/a 
? Patient has Renae Cath: no Reasons if yes: n/a Insertion date:n/a 
 
? Last Vitals: 
  
Vitals:  
 12/17/19 0751 12/17/19 1558 12/17/19 2100 12/18/19 0732 BP: 114/63 96/62 108/61 102/60 Pulse: 67 83 85 76 Resp: 18 18 18 17 Temp: 98.4 °F (36.9 °C) 99.9 °F (37.7 °C) 99 °F (37.2 °C) 98.9 °F (37.2 °C) SpO2: 99% 94% 98% 98% LMP: 11/25/2012  
 
 
? PAIN Pain Assessment Pain Intensity 1: 7 (12/18/19 0400) Pain Intensity 1: 2 (12/29/14 1105) Pain Location 1: Back Pain Location 1: Abdomen Pain Intervention(s) 1: Medication (see MAR) Pain Intervention(s) 1: Medication (see MAR) Patient Stated Pain Goal: 0 Patient Stated Pain Goal: 0 
o Intervention effective: yes   
o Other actions taken for pain: no c/o 
 
? Skin Assessment Skin color Skin Color: Appropriate for ethnicity Condition/Temperature Skin Condition/Temp: Dry, Warm Integrity Skin Integrity: Incision (comment) Turgor Turgor: Non-tenting Weekly Pressure Ulcer Documentation  Pressure  Injury Documentation: No Pressure Injury Noted-Pressure Ulcer Prevention Initiated Wound Prevention & Protection Methods Orientation of wound Orientation of Wound Prevention: Posterior Location of Prevention Location of Wound Prevention: Sacrum/Coccyx Dressing Present Dressing Present : No 
Dressing Status Wound Offloading Wound Offloading (Prevention Methods): Bed, pressure redistribution/air, Repositioning ? INTAKE/OUPUT Date 12/17/19 0700 - 12/18/19 1283 12/18/19 0700 - 12/19/19 1127 Shift 9369-8585 1409-6003 24 Hour Total 0783-8403 1530-4293 24 Hour Total  
INTAKE Shift Total        
OUTPUT Urine Urine Occurrence(s) 3 x 4 x 7 x Stool Stool Occurrence(s) 1 x 1 x 2 x Shift Total        
NET Weight (kg) Recommendations: 1. Patient needs and requests: met 2. Diet: Cardiac DM Reg Chapo Santosh liq 3. Pending tests/procedures: none 4. Functional Level/Equipment: wheelchair 5. Estimated Discharge Date: TBD Posted on Whiteboard in Patients Room: yes [facility]ALS Safety Check A safety check occurred in the patient's room between off going nurse and oncoming nurse listed above. The safety check included the below items Area Items H High Alert Medications ? Verify all high alert medication drips (heparin, PCA, etc.) E Equipment ? Suction is set up for ALL patients (with yanker) ? Red plugs utilized for all equipment (IV pumps, etc.) ? WOWs wiped down at end of shift. ? Room stocked with oxygen, suction, and other unit-specific supplies A Alarms ? Bed alarm is set for fall risk patients ? Ensure chair alarm is in place and activated if patient is up in a chair L Lines ? Check IV for any infiltration ? Renae bag is empty if patient has a Renae ? Tubing and IV bags are labeled Dary Encarnacion Safety ? Room is clean, patient is clean, and equipment is clean. ? Hallways are clear from equipment besides carts. ? Fall bracelet on for fall risk patients ? Ensure room is clear and free of clutter ? Suction is set up for ALL patients (with lashay) ? Hallways are clear from equipment besides carts. ? Isolation precautions followed, supplies available outside room, sign posted

## 2019-12-18 NOTE — INTERDISCIPLINARY ROUNDS
[address] 
[address] [place] [address], [address] INPATIENT REHABILITATION 
PRE-TEAM CONFERENCE SUMMARY Date of Conference: 12/19/19 Patient Information:  
 
  
Name: Kaylene Clark Age / Sex: 62 y.o. / female CSN: [id number] MRN: [id number] Admit Date: 12/16/2019 Length of Stay: 2 days Primary Rehabilitation Diagnosis 1. Impaired Mobility and ADLs 2. S/P T10, T9, T8 laminectomy; T7, T8, T9, T10, T11, T12 posterior thoracic fusion; segmental spinal instrumentation; K2M [place] type, T7-T8, T11-T12 (12/11/2019 - Dr. Hyacinth Medina) 3. History of acute compression fractures of body of thoracic vertebrae (T9 and T10) due to fall 
  
Comorbidities  Diabetic neuropathy associated with type 2 diabetes mellitus (HCC) E11.40  Venous insufficiency I87.2  Obesity, Class I, BMI 30-34.9 E66.9  Chronic back pain M54.9, G89.29  
 Generalized osteoarthritis of multiple sites M15.9  Bipolar affective disorder  F31.9  Abnormally low high density lipoprotein (HDL) cholesterol with hypertriglyceridemia E78.6, E78.1  Diastolic dysfunction without heart failure I51.89  Chronic obstructive pulmonary disease (COPD)  J44.9  Hypertensive heart disease without heart failure I11.9  Depression F32.9  History of back injury Z87.828  Type 2 diabetes mellitus with diabetic neuropathy, with long-term current use of insulin  E11.40, Z79.4  Anxiety F41.9  Wears glasses Z97.3  History of hepatitis C Z86.19  
 Sickle cell trait D57.3  History of acute renal failure Z87.448  Hypothyroidism E03.9  Recurrent genital herpes A60.00  Sarcoidosis D86.9  Abscess of right arm L02.413  Hypoxemia requiring supplemental oxygen R09.02, Z99.81  
 Abnormal nuclear stress test R94.39  
 Cellulitis and abscess of hand L03.119, L02.519  
 Cellulitis and abscess of foot L03.119, L02.619  
 Cellulitis of arm, right L03. [facility]  
  Olecranon bursitis of right elbow M70.21  
 Contusion of left elbow S50. 02XA  Intravenous drug user F19.90  Right Achilles tendinitis M76.61  
 History of penicillin allergy Z88.0  Falls frequently R29.6  Gastroesophageal reflux disease with hiatal hernia K21.9, K44.9  Memory difficulty R41.3  Mixed connective tissue disease  M35.1  Hyperuricemia E79.0  History of vitamin D deficiency Z86.39  
 Urge urinary incontinence N39.41  
 Acute blood loss as cause of postoperative anemia D62  
 History of fracture due to fall Z87.81  Chronic respiratory failure with hypoxia  J96.11  
 Cellulitis of right forearm L03. 113  
 Gout M10.9  Menopause Z78.0  Long term current use of aspirin Z79.82  
 Opioid dependence  F11.20  Tobacco use disorder F17.200 Therapy: FIM SCORES Initial Assessment Weekly Progress Assessment 12/18/2019 Eating Functional Level: 5 Comments: Pt requiring encouragement to demo container mgmt I'ly, requiring set-up over all. Pt able to demo utensil use I'ly  7-independent Swallowing Grooming 4 5 supervision Bathing 2   
 2 max assist  
Upper Body Dressing Functional Level: 3 Items Applied/Steps Completed: Pullover (4 steps) Comments: Pt requiring modA to [facility] gown and modA to shante t-shirt. Pt unable to pull down shirt anterior/posterior. 3 mos assist  
Lower Body Dressing Functional Level: 1 Items Applied/Steps Completed: Sock, left (1 step), Sock, right (1 step), Elastic waist pants (3 steps), Underpants (3 steps) Comments: Pt requiring total asst for compression stockings/sock mgmt with pt seated EOB. Pt rolling side:side with maxA with rehab tech present to asst with brief mgmt and donning pants. Pt attempting to bridge hips to shante pants over hips with pt unable to fully clear sacrum. 1- total assist  
Toileting Functional Level: 1 Comments: Pt incontinent of bladder/bowel, requiring total asst for hygiene and brief mgmt from bed level. 2 -max assist  
Bladder  level of assist 1 1 Bladder  accident frequency score 1 Bowel  level of assist 1 1 Bowel  accident frequency score Toilet Transfer [place] Toilet Transfer Score: 0 Comments: NT at this time    0 N/T Tub/Shower Transfer [place] Tub or Shower Type: Tub/Shower combination Tub/Shower Transfer Score: 0 Comments: NT at this time due to safety and medical status Comprehension Primary Mode of Comprehension: Auditory Score: 6 Comments: occasional repetition needed Expression Primary Mode of Expression: Verbal 
Score: 6 Social Interaction Score: 5 Comments: re-direction to task at times Problem Solving Score: 4 Comments: min cues for following spinal precautions Memory Score: 5 FIM SCORES Initial Assessment Weekly Progress Assessment 12/18/2019 Bed/Chair/Wheelchair Transfers Transfer Type: Lateral with transfer board Transfer Assistance : 1 (Total assistance)(Max A x 2 for safety, sequencing, appropriate ant trunk ) Sit to Stand Assistance: Total assistance Car Transfers: Not tested Car Type: N/A Transfer Type: Lateral with transfer board Transfer Assistance : 2 (Maximal assistance)(Second person assist for equipment only) Sit to Stand Assistance: (not tested today) Bed Mobility Rolling Right 2 (Maximal assistance) Rolling Left 2 (Maximal assistance) Supine to Sit 2 (Maximal assistance) Sit to Stand Total assistance Sit to Supine 2 (Maximal assistance) Rolling Right 3 (Moderate assistance ) Rolling Left 
 3 (Moderate assistance ) Supine to Sit 
 2 (Maximal assistance) Sit to Stand (not tested today) Sit to Supine 
  max A Locomotion (W/C) Able to Propel (ft): 140 feet(max A for steering) Functional Level: 2 Curbs/Ramps Assist Required (FIM Score): 0 (Not tested) Wheelchair Setup Assist Required : 2 (Maximal assistance) Wheelchair Management: Manages left brake(needing assist with brakes) Function 2 Setup Assistance 
2 (Maximal assistance) Locomotion (W/C distance) 140 Feet(max A for steering appropriately) 64 feet(min/mod A and cues for steering) Locomotion (Walk) 0 (Not tested)  not tested Locomotion (Walk dist.) 0 Feet (ft)  0 ft Steps/Stairs Steps/Stairs Ambulated (#): 0 Level of Assist : 0 (Not tested) Rail Use: (N/A)  not tested Nursing:  
 
Neuro:   AAA&O x  4 Respiratory:   [x] WNL   [] O2 LPM:  
Other: 
Peripheral Vascular:   [x] TEDS present   [] Edema present ____ Grade Cardiac:   [x] WNL   [] Other Genitourinary:   [] continent   [x] incontinent   [] rousseau  Abdominal ____12/19/19___ LBM 
GI: _Cardiac Soft Solid______ Diet ___Thin___ Liquids _____ tube feeds Musculoskeletal: ____ ROM Transfers _wheelchair, slideboard____ Assistive Device Used _x2___ Level of Assistance Skin Integumentary:   [] Intact   [x] Not Intact   ____repositioning______Preventative Measures Details___surgical incision to back____________________________________________________ Pain: [] Controlled   [x] Not Controlled   Pain Meds:   [] Scheduled   [x] PRN Registered Dietitian / Nutrition:  
No data found. Pt reported good appetite, fair meal intake due to poor dentition, missing teeth. Food preferences discussed. Agreed with plan to downgrade diet consistency. Not consuming Glucerna Shake; discussed other supplement options. Supplements:          [x] Yes: Glucerna Shake once daily   [] No     
Amount of supplement consumed: 0% No intake or output data in the 24 hours ending 12/18/19 1656 Last bowel movement: 12/18 Interdisciplinary Team Goals: 1. Discipline  Physical Therapy Goal  Patient will perform bed<->chair transfer with transfer board at mod A level.    
 Barrier  Decreased trunk control, decreased LE strength, impaired sitting balance, decreased endurance Intervention  Therapeutic exercise, therapeutic activity, balance activities, patient education Goal written by:   Elke Lucas, PT, DPT  
 
2. Discipline  Occupational Therapy Goal  Pt to be supervision suing AD with LB dressing in long sitting while following lumbar precaution Barrier Decrease trunk control, decrease UE's strength Intervention  Strengthening and education Goal written by:  SABINE Coleman/JANNET,   
 
3. Discipline  Speech Therapy Goal    
 Barrier Intervention Goal written by: 4. Discipline  Nursing Goal  Patient will be continent of urine 80% of the time and  continent of bowel 100 % of the time prior to discharge. Barrier  Neuropathy and poor sensation Intervention  Patient will be offered to be toileted every 2-3 hours and she will be encouraged to call for assistance any time she feels the urgency to void. Goal written by:  Carter Salinas. Gerda Pérez, RN  
 
5. Discipline  Clinical Psychology Goal  Maintain mood stability in recovery effort Barrier  History of mood disturbance and current situational stress Intervention  Support  and Rx Goal written by:  Jerome Mcghee, PhD  
 
6. Discipline  Nutrition / Dietetics Goal  - PO nutrition intake will meet >75% of patient estimated nutritional needs within the next 7 days. Outcome: Progressing towards goal    
 Barrier  poor dentition Intervention  Downgrade diet consistency to Soft Solids. Add food preferences. Modify supplements: change to Ensure Pudding TID Goal written by:  Rafael Caldwell RD Disposition / Discharge Planning: Follow-up services:  [x] Physical Therapy [x] Occupational Therapy     
 [] Speech Therapy         
 [] Skilled Nursing    
 [] Medical Social Worker 
 [] Aide        [] Outpatient      [] vs 
 [x] Home Health  [x] vs 
     [] to progress to outpatient 
     [] with 24-hour supervision [x] with 24-hour assistance [x] Jonathan Caldwell DME recommendations:  wheelchair Estimated discharge date:  1/14/20 Discharge Location:  [x] Home  [] versus  
 [] Jonathan Caldwell [] [address] [] Other:   
  
 
 
Electronic Signatures:  
 
 Signature Date Signed Physical Therapist 
  Sybil Bolton, PT, DPT  12/18/2019 Occupational Therapist 
 Baudilio Valiente, REGALADO/L Yosef Buenrostro, MSOTR/L   12/18/19 Speech Therapist 
      
Recreational Therapist 
 Regina Medina, [address] 12/18/2019 Nursing Magda De La Torre, RN 12/19/2019 Dietitian Ilana Maguire RD  12/18/19 Clinical Psychologist 
  Charlene Anguiano, PhD  12/19/19 Physician 
  Maxwell Betancourt MD   12/18/2019  Melissa Mane, MSW  12/18/19 The above information has been reviewed with the patient in a language that they can understand. Opportunity for comments and questions has been provided and a signed attestation has been scanned into the \"media tab\" of the EMR. Patient Signature: ______________________________________________________ Date Signed: __________________________________________________________

## 2019-12-18 NOTE — PROGRESS NOTES
Attempted to complete initial interview. Pt UA x 2 eating and now headed to gym for OT session. Will recheck 12/19/19

## 2019-12-18 NOTE — ROUTINE PROCESS
Per MD Mary Somers IV line access in Patients neck is to stay in until we are for sure patient is being transferred to Rehab.

## 2019-12-18 NOTE — REHAB NOTE
[address] 
[address] [place] [address], [address] INPATIENT REHABILITATION 
OVERALL PLAN OF CARE Name: Braden Sinha CSN: [id number] Age: 62 y.o. MRN: [id number] Sex: female Admit Date: 12/16/2019 Primary Rehabilitation Diagnosis 1. Impaired Mobility and ADLs 2. S/P T10, T9, T8 laminectomy; T7, T8, T9, T10, T11, T12 posterior thoracic fusion; segmental spinal instrumentation; K2M [place] type, T7-T8, T11-T12 (12/11/2019 - Dr. Karol Gregg) 3. History of acute compression fractures of body of thoracic vertebrae (T9 and T10) due to fall 
  
Comorbidities  Diabetic neuropathy associated with type 2 diabetes mellitus (HCC) E11.40  Venous insufficiency I87.2  Obesity, Class I, BMI 30-34.9 E66.9  Chronic back pain M54.9, G89.29  
 Generalized osteoarthritis of multiple sites M15.9  Bipolar affective disorder  F31.9  Abnormally low high density lipoprotein (HDL) cholesterol with hypertriglyceridemia E78.6, E78.1  Diastolic dysfunction without heart failure I51.89  Chronic obstructive pulmonary disease (COPD)  J44.9  Hypertensive heart disease without heart failure I11.9  Depression F32.9  History of back injury Z87.828  Type 2 diabetes mellitus with diabetic neuropathy, with long-term current use of insulin  E11.40, Z79.4  Anxiety F41.9  Wears glasses Z97.3  History of hepatitis C Z86.19  
 Sickle cell trait D57.3  History of acute renal failure Z87.448  Hypothyroidism E03.9  Recurrent genital herpes A60.00  Sarcoidosis D86.9  Abscess of right arm L02.413  Hypoxemia requiring supplemental oxygen R09.02, Z99.81  
 Abnormal nuclear stress test R94.39  
 Cellulitis and abscess of hand L03.119, L02.519  
 Cellulitis and abscess of foot L03.119, L02.619  
 Cellulitis of arm, right L03. 113  
 Olecranon bursitis of right elbow M70.21  
 Contusion of left elbow S50. 02XA  Intravenous drug user F19.90  
  Right Achilles tendinitis M76.61  
 History of penicillin allergy Z88.0  Falls frequently R29.6  Gastroesophageal reflux disease with hiatal hernia K21.9, K44.9  Memory difficulty R41.3  Mixed connective tissue disease  M35.1  Hyperuricemia E79.0  History of vitamin D deficiency Z86.39  
 Urge urinary incontinence N39.41  
 Acute blood loss as cause of postoperative anemia D62  
 History of fracture due to fall Z87.81  Chronic respiratory failure with hypoxia  J96.11  
 Cellulitis of right forearm L03. 113  
 Gout M10.9  Menopause Z78.0  Long term current use of aspirin Z79.82  
 Opioid dependence  F11.20  Tobacco use disorder F17.200 ANTICIPATED INTERVENTIONS THAT SUPPORT THE MEDICAL NECESSITY OF THIS ADMISSION: 
 
I. Physical Therapy 
            A. Intensity: 1.5 hour per day 
            B. Frequency: 5 times per week 
            C. Duration: 4 weeks             J. Long Term Goals: 1. Patient will move from supine to sit and sit to supine , scoot up and down and roll side to side in bed with supervision/set-up. 2.  Patient will transfer from bed to chair and chair to bed with contact guard assist/stand-by assist using the least restrictive device. 3.  Patient will perform sit to stand with minimal assistance/contact guard assist. 
  4.  Patient will participate in gait assessment if and when appropriate. 5.  Patient will perform wheelchair mobility over even surfaces at supervision level for at least 150 ft, including negotiation of doorways. 6.  Patient will perform wheelchair mobility up/down ramp, uneven sidewalk min A level.   
 E. Interventions: Interventions may include range of motion (AROM, PROM B LE/trunk), motor function (B LE/trunk strengthening/coordination), activity tolerance (vitals, oxygen saturation levels), bed mobility training, balance activities, gait training (progressive ambulation program), wheelchair management and functional transfer training. II. Occupational Therapy 
21 . Intensity: 1.5 hour per day 
            B. Frequency: 5 times per week 
            C. Duration: 4 weeks             K. Long Term Goals: 1. Pt will perform grooming with Mod I. 
  2. Pt will perform UB bathing with SBA. 3. Pt will perform LB bathing with [place]. 4. Pt will perform tub/shower transfer <> TTB with Min A.  
  5. Pt will perform UB dressing with Set-up. 6. Pt will perform LB dressing with Min A. 
  7. Pt will perform toileting task with Min A. 
  8. Pt will perform toilet transfer with Min A/CGA. E. Interventions: Interventions may include range of motion (AROM, PROM B UE), motor function (B UE/ strengthening), activity tolerance (vitals, oxygen saturation levels), AE training, balance training, ADL/IADL training and functional transfer training. PHYSICIAN'S ASSESSMENT OF FINDINGS: 
 
Are the established goals sufficient for achieving the optimal level of function? [x]  Yes      []  No 
 
What changes would you recommend to the goals as written? None Are the interventions noted sufficient for achieving the optimal level of function? [x]  Yes      []  No 
 
What changes would you recommend to the interventions noted? If therapy staff is unable to provide 3 hr of total therapy per day in 5 days due to medical issues or decreased patient tolerance, may modify treatment schedule to 15 hr/week. Estimated length of stay: 2-3 weeks Medical rehabilitation prognosis: 
  []  Excellent [x]  Good  
  []  Fair  
  []  Guarded Discharge Destination:  
  [x]  Home  
  []  [address]  
  []  Jonathan Javi  
  []  Mohan Ibarra  
  []  [facility] []  Other:  
 
 
Signed:    Rigoberto Hurst MD 
  December 18, 2019

## 2019-12-18 NOTE — PROGRESS NOTES
[id number] 
[address] [place] [address], [address] INPATIENT REHABILITATION 
DAILY PROGRESS NOTE Date: 12/18/2019 Name: Apryl Medrano Age / Sex: 62 y.o. / female CSN: [id number] MRN: [id number] Admit Date: 12/16/2019 Length of Stay: 2 days Primary Rehab Diagnosis: Impaired Mobility and ADLs secondary to: 
1. S/P T10, T9, T8 laminectomy; T7, T8, T9, T10, T11, T12 posterior thoracic fusion; segmental spinal instrumentation; K2M [place] type, T7-T8, T11-T12 (12/11/2019 - Dr. Radha Coburn) 2. History of acute compression fractures of body of thoracic vertebrae (T9 and T10) due to fall Subjective:  
 
Patient seen and examined. Blood pressure controlled. Blood glucose fairly controlled. Patient's Complaint:  
Cough Pain Control: stable, mild-to-moderate joint symptoms intermittently, reasonably well controlled by current meds Objective:  
 
Vital Signs: 
Patient Vitals for the past 24 hrs: 
 BP Temp Pulse Resp SpO2  
12/18/19 0732 102/60 98.9 °F (37.2 °C) 76 17 98 % 12/17/19 2100 108/61 99 °F (37.2 °C) 85 18 98 % 12/17/19 1558 96/62 99.9 °F (37.7 °C) 83 18 94 % Physical Examination: 
GENERAL SURVEY: Patient is awake, alert, oriented x 3, sitting comfortably on the chair, not in acute respiratory distress. HEENT: pale palpebral conjunctivae, anicteric sclerae, no nasoaural discharge, moist oral mucosa NECK: supple, no jugular venous distention, no palpable lymph nodes CHEST/LUNGS: symmetrical chest expansion, good air entry, clear breath sounds HEART: adynamic precordium, good S1 S2, no S3, regular rhythm, no murmurs ABDOMEN: obese, bowel sounds appreciated, soft, non-tender EXTREMITIES: pale nailbeds, no edema, full and equal pulses, no calf tenderness NEUROLOGICAL EXAM: The patient is awake, alert and oriented x3, able to answer questions fairly appropriately, able to follow 1 and 2 step commands. Able to tell time from the wall clock. Cranial nerves II-XII are grossly intact. No gross sensory deficit. Motor strength is 4-/5 on BUE, 2+/5 on the RLE (except for 1/5 on the right ankle), 2-/5 on the LLE (except for 2+/5 on the left knee).   
Incision(s): healing well, clean, dry, and intact and serosanguineous Current Medications: 
Current Facility-Administered Medications Medication Dose Route Frequency  acetaminophen (TYLENOL) tablet 650 mg  650 mg Oral Q4H PRN  
 bisacodyl (DULCOLAX) tablet 10 mg  10 mg Oral Q48H PRN  
 ferrous sulfate tablet 325 mg  1 Tab Oral DAILY WITH BREAKFAST  ascorbic acid (vitamin C) (VITAMIN C) tablet 250 mg  250 mg Oral DAILY WITH BREAKFAST  insulin lispro (HUMALOG) injection   SubCUTAneous TIDAC  nicotine (NICODERM CQ) 14 mg/24 hr patch 1 Patch  1 Patch TransDERmal DAILY  senna-docusate (PERICOLACE) 8.6-50 mg per tablet 2 Tab  2 Tab Oral PCD  methadone (DOLOPHINE) tablet 20 mg  20 mg Oral DAILY  oxyCODONE-acetaminophen (PERCOCET 10)  mg per tablet 1 Tab  1 Tab Oral Q4H PRN  predniSONE (DELTASONE) tablet 30 mg  30 mg Oral DAILY WITH BREAKFAST  insulin glargine (LANTUS) injection 20 Units  20 Units SubCUTAneous ACB  famotidine (PEPCID) tablet 20 mg  20 mg Oral BID  rosuvastatin (CRESTOR) tablet 5 mg  5 mg Oral QHS  albuterol (PROVENTIL VENTOLIN) nebulizer solution 2.5 mg  2.5 mg Nebulization Q4H PRN  
 levothyroxine (SYNTHROID) tablet 75 mcg  75 mcg Oral 6am  
 cholecalciferol (VITAMIN D3) (400 Units /10 mcg) tablet 5 Tab  2,000 Units Oral DAILY  calcium citrate tablet 200 mg [elemental]  200 mg Oral BID  divalproex DR (DEPAKOTE) tablet 500 mg  500 mg Oral QHS  fluticasone-vilanterol (BREO ELLIPTA) 100mcg-25mcg/puff  1 Puff Inhalation DAILY  oxybutynin (DITROPAN) tablet 5 mg  5 mg Oral BID  allopurinol (ZYLOPRIM) tablet 100 mg  100 mg Oral DAILY  sertraline (ZOLOFT) tablet 50 mg  50 mg Oral DAILY  methocarbamol (ROBAXIN) tablet 500 mg  500 mg Oral Q8H  
 aspirin chewable tablet 81 mg  81 mg Oral DAILY WITH BREAKFAST  docusate sodium (COLACE) capsule 100 mg  100 mg Oral DAILY AFTER BREAKFAST Allergies: Allergies Allergen Reactions  Moxifloxacin Rash  Pcn [Penicillins] Swelling  Sulfa (Sulfonamide Antibiotics) Swelling Lab/Data Review: 
Recent Results (from the past 24 hour(s)) GLUCOSE, POC Collection Time: 12/17/19  4:58 PM  
Result Value Ref Range Glucose (POC) 271 (H) 70 - 110 mg/dL GLUCOSE, POC Collection Time: 12/17/19  8:19 PM  
Result Value Ref Range Glucose (POC) 189 (H) 70 - 110 mg/dL GLUCOSE, POC Collection Time: 12/18/19  7:35 AM  
Result Value Ref Range Glucose (POC) 134 (H) 70 - 110 mg/dL GLUCOSE, POC Collection Time: 12/18/19 12:15 PM  
Result Value Ref Range Glucose (POC) 194 (H) 70 - 110 mg/dL Estimated Glomerular Filtration Rate: On admission, estimated GFR based on a Creatinine of 0.68 mg/dl: 
            Using CKD-EPI = 107.9 mL/min/1.73m2 Using MDRD = 112.4 mL/min/1.73m2 Assessment:  
 
Primary Rehabilitation Diagnosis 1. Impaired Mobility and ADLs 2. S/P T10, T9, T8 laminectomy; T7, T8, T9, T10, T11, T12 posterior thoracic fusion; segmental spinal instrumentation; K2M [place] type, T7-T8, T11-T12 (12/11/2019 - Dr. Sultana Mendez) 3. History of acute compression fractures of body of thoracic vertebrae (T9 and T10) due to fall 
  
Comorbidities  Diabetic neuropathy associated with type 2 diabetes mellitus (HCC) E11.40  Venous insufficiency I87.2  Obesity, Class I, BMI 30-34.9 E66.9  Chronic back pain M54.9, G89.29  
 Generalized osteoarthritis of multiple sites M15.9  Bipolar affective disorder  F31.9  Abnormally low high density lipoprotein (HDL) cholesterol with hypertriglyceridemia E78.6, E78.1  Diastolic dysfunction without heart failure I51.89  
  Chronic obstructive pulmonary disease (COPD)  J44.9  Hypertensive heart disease without heart failure I11.9  Depression F32.9  History of back injury Z87.828  Type 2 diabetes mellitus with diabetic neuropathy, with long-term current use of insulin  E11.40, Z79.4  Anxiety F41.9  Wears glasses Z97.3  History of hepatitis C Z86.19  
 Sickle cell trait D57.3  History of acute renal failure Z87.448  Hypothyroidism E03.9  Recurrent genital herpes A60.00  Sarcoidosis D86.9  Abscess of right arm L02.413  Hypoxemia requiring supplemental oxygen R09.02, Z99.81  
 Abnormal nuclear stress test R94.39  
 Cellulitis and abscess of hand L03.119, L02.519  
 Cellulitis and abscess of foot L03.119, L02.619  
 Cellulitis of arm, right L03. 113  
 Olecranon bursitis of right elbow M70.21  
 Contusion of left elbow S50. 02XA  Intravenous drug user F19.90  Right Achilles tendinitis M76.61  
 History of penicillin allergy Z88.0  Falls frequently R29.6  Gastroesophageal reflux disease with hiatal hernia K21.9, K44.9  Memory difficulty R41.3  Mixed connective tissue disease  M35.1  Hyperuricemia E79.0  History of vitamin D deficiency Z86.39  
 Urge urinary incontinence N39.41  
 Acute blood loss as cause of postoperative anemia D62  
 History of fracture due to fall Z87.81  Chronic respiratory failure with hypoxia  J96.11  
 Cellulitis of right forearm L03. 113  
 Gout M10.9  Menopause Z78.0  Long term current use of aspirin Z79.82  
 Opioid dependence  F11.20  Tobacco use disorder F17.200 Plan: 1. Justification for continued stay: Good progression towards established rehabilitation goals. 2. Medical Issues being followed closely: 
  [x]  Fall and safety precautions  
  []  Wound Care  
  [x]  Bowel and Bladder Function  
  [x]  Fluid Electrolyte and Nutrition Balance  
  []  Pain Control 3. Issues that 24 hour rehabilitation nursing is following: 
  [x]  Fall and safety precautions  
  []  Wound Care  
  [x]  Bowel and Bladder Function  
  [x]  Fluid Electrolyte and Nutrition Balance  
  []  Pain Control   
  [x]  Assistance with and education on in-room safety with transfers to and from the bed, wheelchair, toilet and shower. 4. Acute rehabilitation plan of care: 
  [x]  Continue current care and rehab. [x]  Physical Therapy [x]  Occupational Therapy  
        []  Speech Therapy  
  []  Hold Rehab until further notice 5. Medications: 
  [x]  MAR Reviewed [x]  Continue Present Medications 6. DVT Prophylaxis:   
  []  Lovenox  
  []  Unfractionated Heparin  
  []  Coumadin  
  []  NOAC [x]  ROBERT Stockings  
  []  Sequential Compression Device []  None 7. Orders:  
> S/P T10, T9, T8 laminectomy; T7, T8, T9, T10, T11, T12 posterior thoracic fusion; segmental spinal instrumentation; K2M [place] type, T7-T8, T11-T12 (12/11/2019 - Dr. Sultana Mendez);  History of acute compression fractures of body of thoracic vertebrae (T9 and T10) due to fall 
 > Thoracic spinal precautions 
 > Continue: 
  > Calcium citrate 200 mg PO BID 
  > Cholecalciferol 2,000 units PO once daily 
 
> Acute Postoperative Blood Loss Anemia 
 > Anemia work-up (12/13/2019) showed serum iron 11, TIBC 215, iron % saturation 5, ferritin 146 
 > Hgb/Hct (12/17/2019, on admission) = 7.4/23.1 
 > Reticulocyte count (12/17/2019) = 2.5 
 > Continue: 
  > FeSO4 325 mg PO once daily with breakfast  
  > Ascorbic Acid 250 mg PO once daily with breakfast (to enhance the absorption of the FeSO4)  
 
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 
 > Prior to admission, the patient stated she was on Metolazone 2.5 mg PO q Mon-Wed-Fri 
 > Metolazone was not given during the patient's hospital stay at [facility] [address]  
 > Upon admission to the ARU, held Metolazone  
 
> Chronic obstructive pulmonary disease; Chronic respiratory failure with hypoxemia requiring supplemental oxygen (3 LPM) > Continue: 
  > Fluticasone-Vilanterol 100-25 1 puff inhaled once daily 
  > O2 inhalation at 3 LPM via nasal cannula continuous 
 
> Opioid dependence 
 > Continue Methadone 20 mg PO once daily 
 
> Type 2 diabetes mellitus with stage 3 chronic kidney disease 
 > HbA1c (12/11/2019) = 7.1 
 > Continue: 
  > Increase Insulin glargine from 20 units to 25 units PO once daily before breakfast 
  > Insulin lispro sliding scale SC TID AC only 
> Cough 
 > Start Guaifenesin  mg PO q 12 hr 
 
> Analgesia 
 > Continue: 
  > Acetaminophen 650 mg PO q 4 hr PRN for pain level less than 5/10 
  > Methocarbamol 500 mg PO q 8 hr 
  > Percocet 10/325 1 tab PO q 4 hr PRN for pain level greater than 4/10  
 
> Diet: 
 > Specifications: Cardiac, diabetic consistent carb 1800 kcal, no concentrated sweets, low purine 
 > Solids (consistency): Regular  
 > Liquids (consistency): Thin 8. Patient's progress in rehabilitation and medical issues discussed with the patient. All questions answered to the best of my ability. Care plan discussed with patient and nurse. Signed:    Joesluis Lu MD 
  December 18, 2019

## 2019-12-18 NOTE — PROGRESS NOTES
Problem: Diabetes Self-Management Goal: *Disease process and treatment process Description Define diabetes and identify own type of diabetes; list 3 options for treating diabetes. Outcome: Progressing Towards Goal 
Goal: *Incorporating nutritional management into lifestyle Description Describe effect of type, amount and timing of food on blood glucose; list 3 methods for planning meals. Outcome: Progressing Towards Goal 
Goal: *Incorporating physical activity into lifestyle Description State effect of exercise on blood glucose levels. Outcome: Progressing Towards Goal 
Goal: *Developing strategies to promote health/change behavior Description Define the ABC's of diabetes; identify appropriate screenings, schedule and personal plan for screenings. Outcome: Progressing Towards Goal 
Goal: *Using medications safely Description State effect of diabetes medications on diabetes; name diabetes medication taking, action and side effects. Outcome: Progressing Towards Goal 
Goal: *Monitoring blood glucose, interpreting and using results Description Identify recommended blood glucose targets  and personal targets. Outcome: Progressing Towards Goal 
Goal: *Prevention, detection, treatment of acute complications Description List symptoms of hyper- and hypoglycemia; describe how to treat low blood sugar and actions for lowering  high blood glucose level. Outcome: Progressing Towards Goal 
Goal: *Prevention, detection and treatment of chronic complications Description Define the natural course of diabetes and describe the relationship of blood glucose levels to long term complications of diabetes. Outcome: Progressing Towards Goal 
Goal: *Developing strategies to address psychosocial issues Description Describe feelings about living with diabetes; identify support needed and support network Outcome: Progressing Towards Goal 
Goal: *Insulin pump training Outcome: Progressing Towards Goal 
 Goal: *Sick day guidelines Outcome: Progressing Towards Goal 
Goal: *Patient Specific Goal (EDIT GOAL, INSERT TEXT) Outcome: Progressing Towards Goal 
  
Problem: Patient Education: Go to Patient Education Activity Goal: Patient/Family Education Outcome: Progressing Towards Goal 
  
Problem: Falls - Risk of 
Goal: *Absence of Falls Description Document Elida Etienne Fall Risk and appropriate interventions in the flowsheet. Outcome: Progressing Towards Goal 
Note: Fall Risk Interventions: 
Mobility Interventions: Assess mobility with egress test, Bed/chair exit alarm, Patient to call before getting OOB Medication Interventions: Patient to call before getting OOB Elimination Interventions: Call light in reach, Bed/chair exit alarm, Patient to call for help with toileting needs History of Falls Interventions: Bed/chair exit alarm Problem: Patient Education: Go to Patient Education Activity Goal: Patient/Family Education Outcome: Progressing Towards Goal 
  
Problem: Pressure Injury - Risk of 
Goal: *Prevention of pressure injury Description Document Florian Scale and appropriate interventions in the flowsheet. Outcome: Progressing Towards Goal 
Note: Pressure Injury Interventions: 
Sensory Interventions: Assess changes in LOC Moisture Interventions: Absorbent underpads Activity Interventions: Increase time out of bed, Pressure redistribution bed/mattress(bed type) Mobility Interventions: HOB 30 degrees or less, Pressure redistribution bed/mattress (bed type) Nutrition Interventions: Document food/fluid/supplement intake Friction and Shear Interventions: HOB 30 degrees or less Problem: Patient Education: Go to Patient Education Activity Goal: Patient/Family Education Outcome: Progressing Towards Goal 
  
Problem: Infection - Risk of, Surgical Site Infection Goal: *Absence of surgical site infection signs and symptoms Outcome: Progressing Towards Goal 
  
 Problem: Patient Education: Go to Patient Education Activity Goal: Patient/Family Education Outcome: Progressing Towards Goal 
  
Problem: Pain Goal: *Control of Pain Outcome: Progressing Towards Goal 
Goal: *PALLIATIVE CARE:  Alleviation of Pain Outcome: Progressing Towards Goal 
  
Problem: Patient Education: Go to Patient Education Activity Goal: Patient/Family Education Outcome: Progressing Towards Goal 
  
Problem: Nutrition Deficit Goal: *Optimize nutritional status Outcome: Progressing Towards Goal

## 2019-12-18 NOTE — PROGRESS NOTES
Problem: Mobility Impaired (Adult and Pediatric) Goal: *Therapy Goal (Edit Goal, Insert Text) Description Physical Therapy Goals: STG Initiated 12/17/2019 and to be accomplished within 7 day(s) on 12/24/2019: 
1. Patient will move from supine to sit and sit to supine , scoot up and down and roll side to side in bed with moderate assistance . 2.  Patient will transfer from bed to chair and chair to bed with maximal assistance with one person assist using the least restrictive device. 3.  Patient will perform sit to stand with maximal assistance with one person assist using appropriate AD. 4.  Patient will perform static sitting balance without UE support for at least 2 minutes, demonstrating appropriate upright and midline posture at supervision level for ease of preparation for transfers. 5.  Patient will perform wheelchair mobility moderate assist for 150 ft distance over even surfaces. Physical Therapy Goals: LTG Initiated 12/17/2019 and to be accomplished within 28 day(s) on 01/14/2019:  
1. Patient will move from supine to sit and sit to supine , scoot up and down and roll side to side in bed with supervision/set-up. 2.  Patient will transfer from bed to chair and chair to bed with contact guard assist/stand-by assist using the least restrictive device. 3.  Patient will perform sit to stand with minimal assistance/contact guard assist. 
4.  Patient will participate in gait assessment if and when appropriate. 5.  Patient will perform wheelchair mobility over even surfaces at supervision level for at least 150 ft, including negotiation of doorways. 6.  Patient will perform wheelchair mobility up/down ramp, uneven sidewalk min A level. Outcome: Progressing Towards Goal 
 PHYSICAL THERAPY TREATMENT Patient: Michelet Barnett (65 y.o. female) Date: 12/18/2019 Diagnosis: Status post laminectomy with spinal fusion [Z98.1] Status post laminectomy with spinal fusion Precautions:  spinal precautions, compression fractures, falls risk precautions, 3 LPM O2 via nasal cannula continuously. Chart, physical therapy assessment, plan of care and goals were reviewed. Time In: 8921 Time out: 0900 Time In: 7487 Time Out: 1235 Time In: 2669 Time Out: 1525 Patient Seen For: Therapeutic exercise;Patient education;Balance activities;Transfer training; Wheelchair mobility Pain: 
Patient reporting moderate back pain; Patient receiving medicine at end of AM session from nurse. Treatment modified and educated patient regarding spinal precautions. Patient identified with name and : yes SUBJECTIVE:  
 
Patient initially hesitant to participate in therapy prior to having breakfast, with encouragement and explanation that therapist would assist patient with getting to the dining room for breakfast, patient agreed to treatment. OBJECTIVE DATA SUMMARY:  
Objective: BED/MAT MOBILITY Daily Assessment Rolling Right : 3 (Moderate assistance ) Rolling Left : 3 (Moderate assistance ) Supine to Sit : 2 (Maximal assistance) Mod A for rolling using hand rails for assist, needing assist only with lifting each LE. Needing assist at trunk and bilat LE for supine<->sit using logrolling technique. TRANSFERS Daily Assessment Transfer Type: Lateral with transfer board Transfer Assistance : 2 (Maximal assistance)(Second person assist for equipment only) Sit to Stand Assistance: (not tested today) Needing Max A and nearly constant verbal/tactile cues for sequencing of lateral transfer using transfer board, needing assist with positioning of board (slight support at trunk while board being positioned), cues for maintaining trunk forward and using bilat UE and LE to assist with scooting across board vs sliding. Needing assist with repositioning of bilat feet throughout transfer. BALANCE Daily Assessment Sitting - Static: Fair (occasional) Sitting - Dynamic: Fair (occasional); Occassional;Poor (constant support) Static sitting edge of bed needing intermittent steadying support, patient needing UE support for holding balance. Observed to have forward flexed posture during sitting balance needing cues for more upright posture for improved trunk control. WHEELCHAIR MOBILITY Daily Assessment Able to Propel (ft): 64 feet(min/mod A and cues for steering) Functional Level: 2 Curbs/Ramps Assist Required (FIM Score): 0 (Not tested) Wheelchair Setup Assist Required : 2 (Maximal assistance) Wheelchair Management: Manages left brake;Manages right brake(using brake extender to assist) Using bilat UE to propel chair, fatiguing after 64 ft bout. THERAPEUTIC EXERCISES Daily Assessment Extremity: Both Exercise Type #1: Seated lower extremity strengthening(LAQ, assisted on right to end range, posterior thigh support) Sets Performed: 3 Reps Performed: 10 Level of Assist: Moderate assistance Exercise Type #2: Seated lower extremity strengthening(hip flex AAROM, hip abd/add isometrics) Sets Performed: 3 Reps Performed: 5 Therex for improved ability to participate in bed mobility and transfers. ASSESSMENT: 
Patient continues to have significant LE weakness requiring significant assist for bed mobility and transfers. Will benefit from ongoing intensive skilled PT to improve ability to perform safe functional mobility. Progression toward goals: 
[]      Improving appropriately and progressing toward goals [x]      Improving slowly and progressing toward goals 
[]      Not making progress toward goals and plan of care will be adjusted PLAN: 
Patient continues to benefit from skilled intervention to address the above impairments. Continue treatment per established plan of care.  
Discharge Recommendations:  [address] depending on progress toward LTG 
 Further Equipment Recommendations for Discharge:  wheelchair will likely be required at discharge; will continue to reassess based on patient's progress. Estimated LOS: 4 weeks Activity Tolerance:  
Fair. Please refer to the flowsheet for vital signs taken during this treatment. After treatment:  
Patient left seated in wheelchair in dining room with staff present after first two sessions, left seated in wheelchair in room with call button within reach after last session. Bethanie Lora, PT 
12/18/2019

## 2019-12-18 NOTE — PROGRESS NOTES
Problem: Self Care Deficits Care Plan (Adult) Goal: *Therapy Goal (Edit Goal, Insert Text) Description Occupational Therapy Goals Long Term Goals Initiated 19 and to be accomplished within 4 week(s)  to facilitate increased self care independence and strength for return to PLOF and decreased care giver burden: 2. Pt will perform grooming with Mod I. 
3. Pt will perform UB bathing with SBA. 4. Pt will perform LB bathing with Tyree. 5. Pt will perform tub/shower transfer <> TTB with Min A.  
6. Pt will perform UB dressing with Set-up. 7. Pt will perform LB dressing with Min A. 
8. Pt will perform toileting task with Min A. 
9. Pt will perform toilet transfer with Min A/CGA. Short Term Goals Initiated 19 and to be accomplished within 7 day(s) (19) to facilitate increased self care independence and strength for return to PLOF and decreased care giver burden: 1. Pt will perform self-feeding with Mod I.  
2. Pt will perform grooming with set-up. 3. Pt will perform UB bathing with Mod A. 
4. Pt will perform LB bathing with Mod A in LRE. 5. Pt will perform tub/shower transfer <> TTB with MaxA x 1. 
6. Pt will perform UB dressing with SBA. 7. Pt will perform LB dressing with Mod A using AE as needed. 8. Pt will perform toileting task with Max A x 1. 
9. Pt will perform toilet transfer with MaxA x 1. Outcome Measures: 
(19) Dynamometer  strength: Right= 27.33# (norm=55. 1#) Left= 34.67# (norm=45.7#) Outcome: Progressing Towards Goal 
Goal: *Therapy Goal (Edit Goal, Insert Text) Outcome: Progressing Towards Goal 
Goal: Interventions Outcome: Progressing Towards Goal 
OCCUPATIONAL THERAPY TREATMENT Patient: Dell Wick 62 y.o. Patient identified with name and : yes Date: 2019 First Tx Session Time In: 1100 Time Out[de-identified] 8886 Second Tx Session Time In:200 Time Out[de-identified] 300 Diagnosis: Status post laminectomy with spinal fusion [Z98.1] Precautions:   
Chart, occupational therapy assessment, plan of care, and goals were reviewed. Pain: 
Pt reports 9/10 pain or discomfort prior to treatment. Pt reports 9/10 pain or discomfort post treatment. Intervention Provided: Pt received pain prior to sesssion SUBJECTIVE:  
Patient stated  I am having spasms all over my back .  OBJECTIVE DATA SUMMARY:  
Pt seen in wheel chair in room with nursing and CNA present attempting transfer to bed for toileting tasks 2/2 pt being incontinent. Pt transfer with sliding board and OT assist with toileting tasks/ LB dressing. Pt's surgeon PA removed honeycomb dressing from pt's back and requested incision should not be wet in 10-12 days. THERAPEUTIC ACTIVITY Daily Assessment Pt daughter present as participated with Tie dye Wilmer shirt. Pt's daughter attempted to assist pt with activity 2/2 pt c/o of pain, pt was encourage to performs activity for hand strengthening. TOILETING Daily Assessment Toileting - Toileting Assistance (FIM Score): 2 (Maximal assistance) Cues: Pt to bridge to doff pants off buttocks/ hips, pt req'd physical assistance for pants down in sideline with log rolling. Pt than slided pants down on thigh. Therapies began performing hygiene on pt's buttocks than pt began having a bowel movement therefore, pt was place on bed pan notifying nursing and CNA. Adaptive Equipment: (Bed pan) LOWER BODY DRESSING Daily Assessment Lower Body Dressing Dressing Assistance : 2 (Maximal assistance) Leg Crossed Method Used: No 
Position Performed: Long sitting on bed Comments: Pt provided min assist with doffing pants PM session:  4 (Minimal assistance) Leg Crossed Method Used: No 
Position Performed: Seated in chair Adaptive Equipment Used: Reacher;Sock aid. Comments: Educate pt on using AD with doff/donning sock.  Pt req'd min assist guiding reacher with doffing sock off heal, follow commands/ good carryover on donning sock with sock aid. MOBILITY/TRANSFERS Daily Assessment Pt transfer to EOB on sliding board, mod assist/ min verbal cuing for proper hand placement. Than assist x2 into supine. ASSESSMENT: Pt has poor initiation with self care and participation with therapeutic activity. Pt would benefit from education on using AD for independency and initiating tasks for self proficiency. Con't with education with lumbar precaution Progression toward goals: 
[]          Improving appropriately and progressing toward goals [x]          Improving slowly and progressing toward goals 
[]          Not making progress toward goals and plan of care will be adjusted PLAN: 
Patient continues to benefit from skilled intervention to address the above impairments. Continue treatment per established plan of care. Discharge Recommendations: Home Health with supervision Further Equipment Recommendations for Discharge: TBD Activity Tolerance: 
Fair Estimated LOS:TBD Please refer to the flow sheet for vital signs taken during this treatment. After treatment:  
[x]  Patient left in no apparent distress sitting up in chair  
[]  Patient left in no apparent distress in bed 
[x]  Call bell left within reach 
[]  Nursing notified 
[]  Caregiver present 
[]  Bed alarm activated COMMUNICATION/EDUCATION:  
[] Home safety education was provided and the patient/caregiver indicated understanding. [] Patient/family have participated as able in goal setting and plan of care. [x] Patient/family agree to work toward stated goals and plan of care. [] Patient understands intent and goals of therapy, but is neutral about his/her participation. [] Patient is unable to participate in goal setting and plan of care. Roman FORREST 
12/18/2019

## 2019-12-19 NOTE — PROGRESS NOTES
Problem: Diabetes Self-Management Goal: *Disease process and treatment process Description Define diabetes and identify own type of diabetes; list 3 options for treating diabetes. Outcome: Progressing Towards Goal 
Goal: *Incorporating nutritional management into lifestyle Description Describe effect of type, amount and timing of food on blood glucose; list 3 methods for planning meals. Outcome: Progressing Towards Goal 
Goal: *Incorporating physical activity into lifestyle Description State effect of exercise on blood glucose levels. Outcome: Progressing Towards Goal 
Goal: *Developing strategies to promote health/change behavior Description Define the ABC's of diabetes; identify appropriate screenings, schedule and personal plan for screenings. Outcome: Progressing Towards Goal 
Goal: *Using medications safely Description State effect of diabetes medications on diabetes; name diabetes medication taking, action and side effects. Outcome: Progressing Towards Goal 
Goal: *Monitoring blood glucose, interpreting and using results Description Identify recommended blood glucose targets  and personal targets. Outcome: Progressing Towards Goal 
Goal: *Prevention, detection, treatment of acute complications Description List symptoms of hyper- and hypoglycemia; describe how to treat low blood sugar and actions for lowering  high blood glucose level. Outcome: Progressing Towards Goal 
Goal: *Prevention, detection and treatment of chronic complications Description Define the natural course of diabetes and describe the relationship of blood glucose levels to long term complications of diabetes. Outcome: Progressing Towards Goal 
Goal: *Developing strategies to address psychosocial issues Description Describe feelings about living with diabetes; identify support needed and support network Outcome: Progressing Towards Goal 
Goal: *Insulin pump training Outcome: Progressing Towards Goal 
 Goal: *Sick day guidelines Outcome: Progressing Towards Goal 
Goal: *Patient Specific Goal (EDIT GOAL, INSERT TEXT) Outcome: Progressing Towards Goal 
  
Problem: Patient Education: Go to Patient Education Activity Goal: Patient/Family Education Outcome: Progressing Towards Goal 
  
Problem: Falls - Risk of 
Goal: *Absence of Falls Description Document Jake Isaias Fall Risk and appropriate interventions in the flowsheet. Outcome: Progressing Towards Goal 
Note: Fall Risk Interventions: 
Mobility Interventions: Assess mobility with egress test, Patient to call before getting OOB Medication Interventions: Bed/chair exit alarm, Patient to call before getting OOB Elimination Interventions: Call light in reach, Bed/chair exit alarm, Patient to call for help with toileting needs History of Falls Interventions: Bed/chair exit alarm Problem: Patient Education: Go to Patient Education Activity Goal: Patient/Family Education Outcome: Progressing Towards Goal 
  
Problem: Pressure Injury - Risk of 
Goal: *Prevention of pressure injury Description Document Florian Scale and appropriate interventions in the flowsheet. Outcome: Progressing Towards Goal 
Note: Pressure Injury Interventions: 
Sensory Interventions: Assess changes in LOC Moisture Interventions: Absorbent underpads Activity Interventions: Increase time out of bed, Pressure redistribution bed/mattress(bed type) Mobility Interventions: HOB 30 degrees or less, Pressure redistribution bed/mattress (bed type) Nutrition Interventions: Document food/fluid/supplement intake Friction and Shear Interventions: HOB 30 degrees or less Problem: Patient Education: Go to Patient Education Activity Goal: Patient/Family Education Outcome: Progressing Towards Goal 
  
Problem: Infection - Risk of, Surgical Site Infection Goal: *Absence of surgical site infection signs and symptoms Outcome: Progressing Towards Goal 
  
 Problem: Patient Education: Go to Patient Education Activity Goal: Patient/Family Education Outcome: Progressing Towards Goal 
  
Problem: Pain Goal: *Control of Pain Outcome: Progressing Towards Goal 
Goal: *PALLIATIVE CARE:  Alleviation of Pain Outcome: Progressing Towards Goal 
  
Problem: Patient Education: Go to Patient Education Activity Goal: Patient/Family Education Outcome: Progressing Towards Goal 
  
Problem: Nutrition Deficit Goal: *Optimize nutritional status Outcome: Progressing Towards Goal

## 2019-12-19 NOTE — PROGRESS NOTES
SHIFT CHANGE NOTE FOR Thelma Spencer Bedside and Verbal shift change report given to [facility] (oncoming nurse) by Linda Kawasaki, LPN (offgoing nurse). Report included the following information SBAR, Kardex, MAR and Recent Results. Situation: 
 Code Status: Full Code Reason for Admission: Spinal Thoracic laminectomy T 6-T 11 Hospital Day: 3 Problem List:  
Hospital Problems  Date Reviewed: 12/17/2019 Codes Class Noted POA Chronic respiratory failure with hypoxia (HCC) (Chronic) ICD-10-CM: J96.11 
ICD-9-CM: 518.83, 799.02  Unknown Yes Overview Signed 12/16/2019 10:11 PM by Isaac Beverly MD  
  On home oxygen inhalation at 3 LPM via [place] Opioid dependence (HCC) (Chronic) ICD-10-CM: Q46.74 ICD-9-CM: 304.00  Unknown Yes Overview Signed 12/16/2019 10:54 PM by Isaac Beverly MD  
  On Methadone Acute blood loss as cause of postoperative anemia ICD-10-CM: D62 
ICD-9-CM: 285.1  12/12/2019 Yes Impaired mobility and ADLs ICD-10-CM: Z74.09 
ICD-9-CM: 799.89  12/11/2019 Yes Spinal cord compression (HCC) ICD-10-CM: G95.20 ICD-9-CM: 336.9  12/11/2019 Yes Compression fracture of body of thoracic vertebra (HCC) ICD-10-CM: S22.000A ICD-9-CM: 805.2  12/11/2019 Yes * (Principal) Status post laminectomy with spinal fusion ICD-10-CM: Z98.1 ICD-9-CM: V45.4  12/11/2019 Yes Overview Signed 12/16/2019 10:05 PM by Isaac Beverly MD  
  S/P T10, T9, T8 laminectomy; T7, T8, T9, T10, T11, T12 posterior thoracic fusion; segmental spinal instrumentation; K2M [place] type, T7-T8, T11-T12 (12/11/2019 - Dr. Julia Munoz) History of fracture due to fall ICD-10-CM: Z87.81 ICD-9-CM: V15.51  12/11/2019 Yes Hypoxemia requiring supplemental oxygen (Chronic) ICD-10-CM: R09.02, Z99.81 ICD-9-CM: 799.02  12/29/2014 Yes Chronic obstructive pulmonary disease (COPD) (HCC) (Chronic) ICD-10-CM: J44.9 ICD-9-CM: 609  Unknown Yes Overview Signed 4/23/2013 10:01 AM by Nicole CALLES  
  SOB, on paula O2 Recent admission with psychosis Hypertensive heart disease without heart failure (Chronic) ICD-10-CM: I11.9 ICD-9-CM: 402.90  Unknown Yes Overview Signed 4/23/2013 10:02 AM by Abhijeet Carter Better controlled Type 2 diabetes mellitus with diabetic neuropathy, with long-term current use of insulin (HCC) (Chronic) ICD-10-CM: E11.40, Z79.4 ICD-9-CM: 250.60, 357.2, V58.67  Unknown Yes Overview Signed 12/18/2019  2:13 PM by Beth Nunez MD  
  HbA1c (12/11/2019) = 7.1 Background: 
 Past Medical History:  
Past Medical History:  
Diagnosis Date  Abnormally low high density lipoprotein (HDL) cholesterol with hypertriglyceridemia Lipid profile (11/6/2016) showed , , HDL 38, LDL 37  Acute blood loss as cause of postoperative anemia 12/12/2019  Anxiety  Bipolar affective disorder ([address] 12/5/2012  Cellulitis of right forearm 05/04/2017  Chronic back pain  Chronic obstructive pulmonary disease (COPD) ([address] SOB, on [place] O2 Recent admission with psychosis  Chronic respiratory failure with hypoxia ([address] On home oxygen inhalation at 3 LPM via [place]  
 Contusion of left elbow 5/4/2017  Depression  Diabetic neuropathy associated with type 2 diabetes mellitus ([address]  Diastolic dysfunction without heart failure Stable on diuretics  Falls frequently  Gastroesophageal reflux disease with hiatal hernia  Generalized osteoarthritis of multiple sites  Gout On Allopurinol  History of acute renal failure 5/31/2013  History of back injury   
 jumped out of second story window  History of fracture due to fall 12/11/2019  
 History of hepatitis C   
 treated  History of penicillin allergy  History of vitamin D deficiency 5/10/2017  Hypertensive heart disease without heart failure Better controlled  Hyperuricemia 5/26/2017  Hypothyroidism  Hypoxemia requiring supplemental oxygen 12/29/2014  Intravenous drug user 5/2/2017  Long term current use of aspirin  Memory difficulty  Menopause  Mixed connective tissue disease ([facility] 5/10/2017  Obesity, Class I, BMI 30-34.9  Olecranon bursitis of right elbow 5/4/2017  Opioid dependence ([facility] On Methadone  Recurrent genital herpes 5/31/2013  Right Achilles tendinitis 5/2/2017  Sarcoidosis  Sickle cell trait ([facility]  Tobacco use disorder  Type 2 diabetes mellitus with diabetic neuropathy, with long-term current use of insulin ([facility] HbA1c (12/11/2019) = 7.1  Urge urinary incontinence 5/10/2017  Venous insufficiency  Wears glasses Patient taking anticoagulants no Patient has a defibrillator: no  
 
Assessment: 
? Changes in Assessment throughout shift: None ? Patient has central line: no Reasons if yes: Removed 12/16/19 Insertion date:n/a Last dressing date:n/a 
? Patient has Renae Cath: no Reasons if yes: n/a Insertion date:n/a 
 
? Last Vitals: 
  
Vitals:  
 12/18/19 2269 12/18/19 1715 12/18/19 2100 12/19/19 4014 BP: 102/60 111/65 112/63 113/65 Pulse: 76 76 82 66 Resp: 17 17 18 18 Temp: 98.9 °F (37.2 °C) 99.2 °F (37.3 °C) 98 °F (36.7 °C) 98.9 °F (37.2 °C) SpO2: 98% 95% 99% 98% LMP: 11/25/2012  
 
 
? PAIN Pain Assessment Pain Intensity 1: 3 (12/19/19 1200) Pain Intensity 1: 2 (12/29/14 1105) Pain Location 1: Back Pain Location 1: Abdomen Pain Intervention(s) 1: Medication (see MAR) Pain Intervention(s) 1: Medication (see MAR) Patient Stated Pain Goal: 0 Patient Stated Pain Goal: 0 
o Intervention effective: yes   
o Other actions taken for pain: no c/o 
 
? Skin Assessment Skin color Skin Color: Appropriate for ethnicity Condition/Temperature Skin Condition/Temp: Dry, Warm Integrity Skin Integrity: Incision (comment) Turgor Turgor: Non-tenting Weekly Pressure Ulcer Documentation  Pressure  Injury Documentation: No Pressure Injury Noted-Pressure Ulcer Prevention Initiated Wound Prevention & Protection Methods Orientation of wound Orientation of Wound Prevention: Posterior Location of Prevention Location of Wound Prevention: Sacrum/Coccyx Dressing Present Dressing Present : No 
Dressing Status Wound Offloading Wound Offloading (Prevention Methods): Bed, pressure redistribution/air, Repositioning ? INTAKE/OUPUT Date 12/18/19 0700 - 12/19/19 4774 12/19/19 0700 - 12/20/19 6499 Shift 0700-1859 1900-0659 24 Hour Total 0700-1859 1900-0659 24 Hour Total  
INTAKE Shift Total        
OUTPUT Urine Urine Occurrence(s) 1 x 4 x 5 x 2 x  2 x Stool Stool Occurrence(s) 0 x 4 x 4 x 1 x  1 x Shift Total        
NET Weight (kg) Recommendations: 1. Patient needs and requests: met 2. Diet: Cardiac DM Reg Saralyn Friendly liq 3. Pending tests/procedures: none 4. Functional Level/Equipment: wheelchair 5. Estimated Discharge Date: TBD Posted on Whiteboard in Patients Room: yes HEALS Safety Check A safety check occurred in the patient's room between off going nurse and oncoming nurse listed above. The safety check included the below items Area Items H High Alert Medications ? Verify all high alert medication drips (heparin, PCA, etc.) E Equipment ? Suction is set up for ALL patients (with yanker) ? Red plugs utilized for all equipment (IV pumps, etc.) ? WOWs wiped down at end of shift. ? Room stocked with oxygen, suction, and other unit-specific supplies A Alarms ? Bed alarm is set for fall risk patients ? Ensure chair alarm is in place and activated if patient is up in a chair L Lines ? Check IV for any infiltration ? Renae bag is empty if patient has a Renae ? Tubing and IV bags are labeled Roxanne Spotted Safety ? Room is clean, patient is clean, and equipment is clean. ? Hallways are clear from equipment besides carts. ? Fall bracelet on for fall risk patients ? Ensure room is clear and free of clutter ? Suction is set up for ALL patients (with lashay) ? Hallways are clear from equipment besides carts. ? Isolation precautions followed, supplies available outside room, sign posted

## 2019-12-19 NOTE — PROGRESS NOTES
[address] 
[address] [place] [address], [address] INPATIENT REHABILITATION 
DAILY PROGRESS NOTE Date: 12/19/2019 Name: Heather Sifuentes Age / Sex: 62 y.o. / female CSN: [id number] MRN: [id number] Admit Date: 12/16/2019 Length of Stay: 3 days Primary Rehab Diagnosis: Impaired Mobility and ADLs secondary to: 
1. S/P T10, T9, T8 laminectomy; T7, T8, T9, T10, T11, T12 posterior thoracic fusion; segmental spinal instrumentation; K2M Davis type, T7-T8, T11-T12 (12/11/2019 - Dr. Wilver Quinteros) 2. History of acute compression fractures of body of thoracic vertebrae (T9 and T10) due to fall Subjective: I personally saw and evaluated this patient on 12/19/19. Patient sitting in a chair in NAD, awake, alert Objective:  
 
Vital Signs: 
Patient Vitals for the past 24 hrs: 
 BP Temp Pulse Resp SpO2  
12/19/19 0741 113/65 98.9 °F (37.2 °C) 66 18 98 % 12/18/19 2100 112/63 98 °F (36.7 °C) 82 18 99 % 12/18/19 1715 111/65 99.2 °F (37.3 °C) 76 17 95 % Physical Examination: 
General:  Awake, alert Cardiovascular:  S1S2+, RRR Pulmonary:  CTA b/l GI:  Soft, BS+, NT, ND Extremities:  trace edema Current Medications: 
Current Facility-Administered Medications Medication Dose Route Frequency  guaiFENesin ER (MUCINEX) tablet 600 mg  600 mg Oral Q12H  
 acetaminophen (TYLENOL) tablet 650 mg  650 mg Oral Q4H PRN  
 bisacodyl (DULCOLAX) tablet 10 mg  10 mg Oral Q48H PRN  
 ferrous sulfate tablet 325 mg  1 Tab Oral DAILY WITH BREAKFAST  ascorbic acid (vitamin C) (VITAMIN C) tablet 250 mg  250 mg Oral DAILY WITH BREAKFAST  insulin lispro (HUMALOG) injection   SubCUTAneous TIDAC  senna-docusate (PERICOLACE) 8.6-50 mg per tablet 2 Tab  2 Tab Oral PCD  methadone (DOLOPHINE) tablet 20 mg  20 mg Oral DAILY  oxyCODONE-acetaminophen (PERCOCET 10)  mg per tablet 1 Tab  1 Tab Oral Q4H PRN  
  predniSONE (DELTASONE) tablet 30 mg  30 mg Oral DAILY WITH BREAKFAST  insulin glargine (LANTUS) injection 20 Units  20 Units SubCUTAneous ACB  famotidine (PEPCID) tablet 20 mg  20 mg Oral BID  rosuvastatin (CRESTOR) tablet 5 mg  5 mg Oral QHS  albuterol (PROVENTIL VENTOLIN) nebulizer solution 2.5 mg  2.5 mg Nebulization Q4H PRN  
 levothyroxine (SYNTHROID) tablet 75 mcg  75 mcg Oral 6am  
 cholecalciferol (VITAMIN D3) (400 Units /10 mcg) tablet 5 Tab  2,000 Units Oral DAILY  calcium citrate tablet 200 mg [elemental]  200 mg Oral BID  divalproex DR (DEPAKOTE) tablet 500 mg  500 mg Oral QHS  fluticasone-vilanterol (BREO ELLIPTA) 100mcg-25mcg/puff  1 Puff Inhalation DAILY  oxybutynin (DITROPAN) tablet 5 mg  5 mg Oral BID  allopurinol (ZYLOPRIM) tablet 100 mg  100 mg Oral DAILY  sertraline (ZOLOFT) tablet 50 mg  50 mg Oral DAILY  methocarbamol (ROBAXIN) tablet 500 mg  500 mg Oral Q8H  
 aspirin chewable tablet 81 mg  81 mg Oral DAILY WITH BREAKFAST  docusate sodium (COLACE) capsule 100 mg  100 mg Oral DAILY AFTER BREAKFAST Allergies: Allergies Allergen Reactions  Moxifloxacin Rash  Pcn [Penicillins] Swelling  Sulfa (Sulfonamide Antibiotics) Swelling Lab/Data Review: 
Recent Results (from the past 24 hour(s)) GLUCOSE, POC Collection Time: 12/18/19  4:55 PM  
Result Value Ref Range Glucose (POC) 318 (H) 70 - 110 mg/dL GLUCOSE, POC Collection Time: 12/18/19  8:14 PM  
Result Value Ref Range Glucose (POC) 216 (H) 70 - 110 mg/dL GLUCOSE, POC Collection Time: 12/19/19  7:41 AM  
Result Value Ref Range Glucose (POC) 99 70 - 110 mg/dL GLUCOSE, POC Collection Time: 12/19/19 12:40 PM  
Result Value Ref Range Glucose (POC) 136 (H) 70 - 110 mg/dL Assessment:  
 
Primary Rehabilitation Diagnosis 1. Impaired Mobility and ADLs 2.  S/P T10, T9, T8 laminectomy; T7, T8, T9, T10, T11, T12 posterior thoracic fusion; segmental spinal instrumentation; K2M Davis type, T7-T8, T11-T12 (12/11/2019 - Dr. Hasrha Amin) 3. History of acute compression fractures of body of thoracic vertebrae (T9 and T10) due to fall 
  
Comorbidities  Diabetic neuropathy associated with type 2 diabetes mellitus (HCC) E11.40  Venous insufficiency I87.2  Obesity, Class I, BMI 30-34.9 E66.9  Chronic back pain M54.9, G89.29  
 Generalized osteoarthritis of multiple sites M15.9  Bipolar affective disorder  F31.9  Abnormally low high density lipoprotein (HDL) cholesterol with hypertriglyceridemia E78.6, E78.1  Diastolic dysfunction without heart failure I51.89  Chronic obstructive pulmonary disease (COPD)  J44.9  Hypertensive heart disease without heart failure I11.9  Depression F32.9  History of back injury Z87.828  Type 2 diabetes mellitus with diabetic neuropathy, with long-term current use of insulin  E11.40, Z79.4  Anxiety F41.9  Wears glasses Z97.3  History of hepatitis C Z86.19  
 Sickle cell trait D57.3  History of acute renal failure Z87.448  Hypothyroidism E03.9  Recurrent genital herpes A60.00  Sarcoidosis D86.9  Abscess of right arm L02.413  Hypoxemia requiring supplemental oxygen R09.02, Z99.81  
 Abnormal nuclear stress test R94.39  
 Cellulitis and abscess of hand L03.119, L02.519  
 Cellulitis and abscess of foot L03.119, L02.619  
 Cellulitis of arm, right L03. 113  
 Olecranon bursitis of right elbow M70.21  
 Contusion of left elbow S50. 02XA  Intravenous drug user F19.90  Right Achilles tendinitis M76.61  
 History of penicillin allergy Z88.0  Falls frequently R29.6  Gastroesophageal reflux disease with hiatal hernia K21.9, K44.9  Memory difficulty R41.3  Mixed connective tissue disease  M35.1  Hyperuricemia E79.0  History of vitamin D deficiency Z86.39  
 Urge urinary incontinence N39.41  
  Acute blood loss as cause of postoperative anemia D62  
 History of fracture due to fall Z87.81  Chronic respiratory failure with hypoxia  J96.11  
 Cellulitis of right forearm L03. 113  
 Gout M10.9  Menopause Z78.0  Long term current use of aspirin Z79.82  
 Opioid dependence  F11.20  Tobacco use disorder F17.200 Plan: 1. Justification for continued stay: Good progression towards established rehabilitation goals. 2. Medical Issues being followed closely: 
  [x]  Fall and safety precautions  
  []  Wound Care  
  [x]  Bowel and Bladder Function  
  [x]  Fluid Electrolyte and Nutrition Balance  
  []  Pain Control 3. Issues that 24 hour rehabilitation nursing is following: 
  [x]  Fall and safety precautions  
  []  Wound Care  
  [x]  Bowel and Bladder Function  
  [x]  Fluid Electrolyte and Nutrition Balance  
  []  Pain Control   
  [x]  Assistance with and education on in-room safety with transfers to and from the bed, wheelchair, toilet and shower. 4. Acute rehabilitation plan of care: 
  [x]  Continue current care and rehab. [x]  Physical Therapy [x]  Occupational Therapy  
        []  Speech Therapy  
  []  Hold Rehab until further notice 5. Medications: 
  [x]  MAR Reviewed [x]  Continue Present Medications 6. DVT Prophylaxis:   
  []  Lovenox  
  []  Unfractionated Heparin  
  []  Coumadin  
  []  NOAC [x]  ROBERT Stockings  
  []  Sequential Compression Device []  None 7. Orders:  
> S/P T10, T9, T8 laminectomy; T7, T8, T9, T10, T11, T12 posterior thoracic fusion; segmental spinal instrumentation; K2M Davis type, T7-T8, T11-T12 (12/11/2019 - Dr. Tiarra Gray);  History of acute compression fractures of body of thoracic vertebrae (T9 and T10) due to fall 
 > spinal precautions 
 
> Acute Postoperative Blood Loss Anemia 
 > ferrous sulfate 
 
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 
 > monitor 
 
> Chronic obstructive pulmonary disease; Chronic respiratory failure with hypoxemia requiring supplemental oxygen (3 LPM) > Continue: 
  > Fluticasone-Vilanterol 100-25 1 puff inhaled once daily 
  > O2 inhalation at 3 LPM via nasal cannula continuous 
 
> Opioid dependence 
 > Continue Methadone 20 mg PO once daily 
 
> Type 2 diabetes mellitus with stage 3 chronic kidney disease 
 >lantus, ssi 
 
 
> Analgesia 
 > Continue: 
  > Acetaminophen 650 mg PO q 4 hr PRN for pain level less than 5/10 
  > Methocarbamol 500 mg PO q 8 hr 
  > Percocet 10/325 1 tab PO q 4 hr PRN for pain level greater than 4/10  
 
> Diet: 
 > Specifications: Cardiac, diabetic consistent carb 1800 kcal, no concentrated sweets, low purine 
 > Solids (consistency): Regular  
 > Liquids (consistency): Thin D/w patient Signed:    Naveed Bhat MD 
 
  December 19, 2019

## 2019-12-19 NOTE — PROGRESS NOTES
Late entry- Dr. Armando Sinha in this am. Addressed concerns about drain site to back draining. Dr. Armando Sinha used derma lucero to drain site.

## 2019-12-19 NOTE — PROGRESS NOTES
Pt is a 62year old female admitted to ARU for laminectomy. Pt is alert and oriented, alone in the room. Pt states that she lives at home with a roommate/friend. Pt lives in a 1 level home with 5 steps to enter and 2 rails. Pt states that she has a tub shower. Pt states that prior to admission she was able to independently mobilize but notes that she has a bedside commode, shower chair, rolling walker, cane and O2 from [facility] [employer]. Pt confirms that she has portable O2 tanks for her dc to home. Pt states that she has used home health after her previous dc from ARU. Pt states that she was referred to outpatient therapy but declined due to transportation challenges. Pt denies history with SNF. Pt states that she does not have a POA and notes that her daughter, Eric Viramontes [phone]), is her NOK contact. Pt confirms her insurance as [employer]. Sw reviewed insurance updates, dc planning and team conference. Pt states understanding and denies questions at this time. Sw will follow.

## 2019-12-19 NOTE — PROGRESS NOTES
SHIFT CHANGE NOTE FOR Sherren Plana Bedside and Verbal shift change report given to Tobi Gomez LPN (oncoming nurse) by Jay Smith RN (offgoing nurse). Report included the following information SBAR, Kardex, MAR and Recent Results. Situation: 
 Code Status: Full Code Reason for Admission: Spinal Thoracic laminectomy T 6-T 11 Hospital Day: 3 Problem List:  
Hospital Problems  Date Reviewed: 12/17/2019 Codes Class Noted POA Chronic respiratory failure with hypoxia (HCC) (Chronic) ICD-10-CM: J96.11 
ICD-9-CM: 518.83, 799.02  Unknown Yes Overview Signed 12/16/2019 10:11 PM by Mg Herring MD  
  On home oxygen inhalation at 3 LPM via NC Opioid dependence (HCC) (Chronic) ICD-10-CM: C32.26 ICD-9-CM: 304.00  Unknown Yes Overview Signed 12/16/2019 10:54 PM by Mg Herring MD  
  On Methadone Acute blood loss as cause of postoperative anemia ICD-10-CM: D62 
ICD-9-CM: 285.1  12/12/2019 Yes Impaired mobility and ADLs ICD-10-CM: Z74.09 
ICD-9-CM: 799.89  12/11/2019 Yes Spinal cord compression (HCC) ICD-10-CM: G95.20 ICD-9-CM: 336.9  12/11/2019 Yes Compression fracture of body of thoracic vertebra (HCC) ICD-10-CM: S22.000A ICD-9-CM: 805.2  12/11/2019 Yes * (Principal) Status post laminectomy with spinal fusion ICD-10-CM: Z98.1 ICD-9-CM: V45.4  12/11/2019 Yes Overview Signed 12/16/2019 10:05 PM by Mg Herring MD  
  S/P T10, T9, T8 laminectomy; T7, T8, T9, T10, T11, T12 posterior thoracic fusion; segmental spinal instrumentation; K2M Davis type, T7-T8, T11-T12 (12/11/2019 - Dr. Nuris Romero) History of fracture due to fall ICD-10-CM: Z87.81 ICD-9-CM: V15.51  12/11/2019 Yes Hypoxemia requiring supplemental oxygen (Chronic) ICD-10-CM: R09.02, Z99.81 ICD-9-CM: 799.02  12/29/2014 Yes Chronic obstructive pulmonary disease (COPD) (HCC) (Chronic) ICD-10-CM: J44.9 ICD-9-CM: 488  Unknown Yes Overview Signed 4/23/2013 10:01 AM by Gurpreet CALLES  
  SOB, on [place] O2 Recent admission with psychosis Hypertensive heart disease without heart failure (Chronic) ICD-10-CM: I11.9 ICD-9-CM: 402.90  Unknown Yes Overview Signed 4/23/2013 10:02 AM by Jazlyn Pimentel Better controlled Type 2 diabetes mellitus with diabetic neuropathy, with long-term current use of insulin (HCC) (Chronic) ICD-10-CM: E11.40, Z79.4 ICD-9-CM: 250.60, 357.2, V58.67  Unknown Yes Overview Signed 12/18/2019  2:13 PM by Catina Wick MD  
  HbA1c (12/11/2019) = 7.1 Background: 
 Past Medical History:  
Past Medical History:  
Diagnosis Date  Abnormally low high density lipoprotein (HDL) cholesterol with hypertriglyceridemia Lipid profile (11/6/2016) showed , , HDL 38, LDL 37  Acute blood loss as cause of postoperative anemia 12/12/2019  Anxiety  Bipolar affective disorder ([address] 12/5/2012  Cellulitis of right forearm 05/04/2017  Chronic back pain  Chronic obstructive pulmonary disease (COPD) ([address] SOB, on [place] O2 Recent admission with psychosis  Chronic respiratory failure with hypoxia ([address] On home oxygen inhalation at 3 LPM via NC  
 Contusion of left elbow 5/4/2017  Depression  Diabetic neuropathy associated with type 2 diabetes mellitus ([address]  Diastolic dysfunction without heart failure Stable on diuretics  Falls frequently  Gastroesophageal reflux disease with hiatal hernia  Generalized osteoarthritis of multiple sites  Gout On Allopurinol  History of acute renal failure 5/31/2013  History of back injury   
 jumped out of second story window  History of fracture due to fall 12/11/2019  
 History of hepatitis C   
 treated  History of penicillin allergy  History of vitamin D deficiency 5/10/2017  Hypertensive heart disease without heart failure Better controlled  Hyperuricemia 5/26/2017  Hypothyroidism  Hypoxemia requiring supplemental oxygen 12/29/2014  Intravenous drug user 5/2/2017  Long term current use of aspirin  Memory difficulty  Menopause  Mixed connective tissue disease ([facility] 5/10/2017  Obesity, Class I, BMI 30-34.9  Olecranon bursitis of right elbow 5/4/2017  Opioid dependence ([facility] On Methadone  Recurrent genital herpes 5/31/2013  Right Achilles tendinitis 5/2/2017  Sarcoidosis  Sickle cell trait ([facility]  Tobacco use disorder  Type 2 diabetes mellitus with diabetic neuropathy, with long-term current use of insulin ([facility] HbA1c (12/11/2019) = 7.1  Urge urinary incontinence 5/10/2017  Venous insufficiency  Wears glasses Patient taking anticoagulants no Patient has a defibrillator: no  
 
Assessment: 
? Changes in Assessment throughout shift: None ? Patient has central line: no Reasons if yes: Removed 12/16/19 Insertion date:n/a Last dressing date:n/a 
? Patient has Renae Cath: no Reasons if yes: n/a Insertion date:n/a 
 
? Last Vitals: 
  
Vitals:  
 12/17/19 2100 12/18/19 0732 12/18/19 1715 12/18/19 2100 BP: 108/61 102/60 111/65 112/63 Pulse: 85 76 76 82 Resp: 18 17 17 18 Temp: 99 °F (37.2 °C) 98.9 °F (37.2 °C) 99.2 °F (37.3 °C) 98 °F (36.7 °C) SpO2: 98% 98% 95% 99% LMP: 11/25/2012  
 
 
? PAIN Pain Assessment Pain Intensity 1: 8 (12/19/19 0646) Pain Intensity 1: 2 (12/29/14 1105) Pain Location 1: Back Pain Location 1: Abdomen Pain Intervention(s) 1: Medication (see MAR) Pain Intervention(s) 1: Medication (see MAR) Patient Stated Pain Goal: 0 Patient Stated Pain Goal: 0 
o Intervention effective: yes   
o Other actions taken for pain: no c/o 
 
? Skin Assessment Skin color Skin Color: Appropriate for ethnicity Condition/Temperature Skin Condition/Temp: Dry, Warm Integrity Skin Integrity: Incision (comment) Turgor Turgor: Non-tenting Weekly Pressure Ulcer Documentation  Pressure  Injury Documentation: No Pressure Injury Noted-Pressure Ulcer Prevention Initiated Wound Prevention & Protection Methods Orientation of wound Orientation of Wound Prevention: Posterior Location of Prevention Location of Wound Prevention: Sacrum/Coccyx Dressing Present Dressing Present : No 
Dressing Status Wound Offloading Wound Offloading (Prevention Methods): Bed, pressure redistribution/air, Repositioning ? INTAKE/OUPUT Date 12/18/19 0700 - 12/19/19 4823 12/19/19 0700 - 12/20/19 2007 Shift 0700-1859 1900-0659 24 Hour Total 0700-1859 1900-0659 24 Hour Total  
INTAKE Shift Total        
OUTPUT Urine Urine Occurrence(s) 1 x 4 x 5 x Stool Stool Occurrence(s) 0 x 4 x 4 x Shift Total        
NET Weight (kg) Recommendations: 1. Patient needs and requests: met 2. Diet: Cardiac DM Reg Vitaliy Sow liq 3. Pending tests/procedures: none 4. Functional Level/Equipment: wheelchair 5. Estimated Discharge Date: TBD Posted on Whiteboard in Patients Room: yes HEALS Safety Check A safety check occurred in the patient's room between off going nurse and oncoming nurse listed above. The safety check included the below items Area Items H High Alert Medications ? Verify all high alert medication drips (heparin, PCA, etc.) E Equipment ? Suction is set up for ALL patients (with yanker) ? Red plugs utilized for all equipment (IV pumps, etc.) ? WOWs wiped down at end of shift. ? Room stocked with oxygen, suction, and other unit-specific supplies A Alarms ? Bed alarm is set for fall risk patients ? Ensure chair alarm is in place and activated if patient is up in a chair L Lines ? Check IV for any infiltration ? Renae bag is empty if patient has a Renae ? Tubing and IV bags are labeled Montine Diana Safety ? Room is clean, patient is clean, and equipment is clean. ? Hallways are clear from equipment besides carts. ? Fall bracelet on for fall risk patients ? Ensure room is clear and free of clutter ? Suction is set up for ALL patients (with lashay) ? Hallways are clear from equipment besides carts. ? Isolation precautions followed, supplies available outside room, sign posted

## 2019-12-19 NOTE — PROGRESS NOTES
Emily Henderson from Dr. Moy Burgos office called inquiring about patient drain site to back that Dr Harpreet lucero @ patient  
 bed side on 12/18/19 . Informed Luiz Berman that drain site is continuing to draining Moderate to large serosanguineous drainage. Luiz Berman will check on patient condition on tomorrow. Patient site with 4x4 and ABDs secured with tape. Honey cone drsg not holding drainage. Kristie Tanner Honey cone drsg to back of neck applied.

## 2019-12-19 NOTE — PROGRESS NOTES
Problem: Self Care Deficits Care Plan (Adult) Goal: *Therapy Goal (Edit Goal, Insert Text) Description Occupational Therapy Goals Long Term Goals Initiated 19 and to be accomplished within 4 week(s)  to facilitate increased self care independence and strength for return to PLOF and decreased care giver burden: 2. Pt will perform grooming with Mod I. 
3. Pt will perform UB bathing with SBA. 4. Pt will perform LB bathing with Tyree. 5. Pt will perform tub/shower transfer <> TTB with Min A.  
6. Pt will perform UB dressing with Set-up. 7. Pt will perform LB dressing with Min A. 
8. Pt will perform toileting task with Min A. 
9. Pt will perform toilet transfer with Min A/CGA. Short Term Goals Initiated 19 and to be accomplished within 7 day(s) (19) to facilitate increased self care independence and strength for return to PLOF and decreased care giver burden: 1. Pt will perform self-feeding with Mod I.  
2. Pt will perform grooming with set-up. 3. Pt will perform UB bathing with Mod A. 
4. Pt will perform LB bathing with Mod A in LRE. 5. Pt will perform tub/shower transfer <> TTB with MaxA x 1. 
6. Pt will perform UB dressing with SBA. 7. Pt will perform LB dressing with Mod A using AE as needed. 8. Pt will perform toileting task with Max A x 1. 
9. Pt will perform toilet transfer with MaxA x 1. Outcome Measures: 
(19) Dynamometer  strength: Right= 27.33# (norm=55. 1#) Left= 34.67# (norm=45.7#) 
   
2019 1736 by Jluis Jennings Outcome: Progressing Towards Goal 
2019 1221 by Jluis Jennings Outcome: Progressing Towards Goal 
Outcome: Progressing Towards Goal 
 OCCUPATIONAL THERAPY TREATMENT Patient: Rosalia Nogueira 62 y.o. Patient identified with name and : Yes 
 
Date: 2019 First Tx Session Time In: 1130 Time Out[de-identified] 6068 Second Tx Session Time In: 215 Time Out[de-identified] 240 Diagnosis: Status post laminectomy with spinal fusion [Z98.1] Precautions:   
Chart, occupational therapy assessment, plan of care, and goals were reviewed. Pain: 
Pt reports 8/10 pain or discomfort prior to treatment. Pt reports *8/10 pain or discomfort post treatment. Intervention Provided: Pt received pain med's prior to session. SUBJECTIVE:  
Patient stated My daughter does this for me ( donning pants over hips/buttocks) I couldn't do this before my surgery.  OBJECTIVE DATA SUMMARY:  
Pt seen in wheel chair upon arrival in room for treatment Pt taken to the gym for treatment. PM session: pt seen on bedpan upon arrival in room for treatment, return ~ 10 minutes later pt was clean up by nursing. Pt provided min assist with LB dressing. THERAPEUTIC ACTIVITY Daily Assessment Pt demonstrates FM tasks making Wilmer ornaments with mild tremor in right hand. Pt peeled off the adhesive from the back of ornaments fastening then to [address]. Followd with [employer]ig pegboard on raised table. Pt used right UE retrieving pegs with jumping over pegs leaving three pegs on the board then used left UE retrieving pegs. LOWER BODY DRESSING Daily Assessment Lower Body Dressing Dressing Assistance : 3 (Moderate assistance) Leg Crossed Method Used: No 
Position Performed: Long sitting on bed Adaptive Equipment Used: Reacher Comments: Pt used reacher retrieving pants from ankle donning pants to stomach. Pt attempted to log roll while  maintaining position in side line,donning pants over buttocks. Pt demonstrates the inability to follow lumbar precaution. MOBILITY/TRANSFERS Daily Assessment Bed mobility to EOB mod assist. Pt weight shifting to the right to have sliding board place under buttocks. Pt req'd min assist/ verbal cuing for proper hand placement during transfer from EOB to wheel chair. ASSESSMENT: Pt has the potential to make progress with self care, but showed poor initiation with self care and inability to follow lumbar precaution. .  Pt demonstrates FM tasks without difficulty. Progression toward goals: 
[]          Improving appropriately and progressing toward goals [x]          Improving slowly and progressing toward goals 
[]          Not making progress toward goals and plan of care will be adjusted PLAN: 
Patient continues to benefit from skilled intervention to address the above impairments. Continue treatment per established plan of care. Discharge Recommendations: Home Health Further Equipment Recommendations for Discharge:Pt has DME from prior rehab stay. Activity Tolerance: 
Fair Estimated LOS:*TBD Please refer to the flow sheet for vital signs taken during this treatment. After treatment:  
[x]  Patient left in no apparent distress sitting up in chair  
[]  Patient left in no apparent distress in bed 
[]  Call bell left within reach 
[]  Nursing notified 
[]  Caregiver present [x]  Pt hand off to PT  
 
COMMUNICATION/EDUCATION:  
[] Home safety education was provided and the patient/caregiver indicated understanding. [] Patient/family have participated as able in goal setting and plan of care. [x] Patient/family agree to work toward stated goals and plan of care. [] Patient understands intent and goals of therapy, but is neutral about his/her participation. [] Patient is unable to participate in goal setting and plan of care. Destinee FORREST 
12/19/2019
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
## 2019-12-20 NOTE — PROGRESS NOTES
Problem: Mobility Impaired (Adult and Pediatric) Goal: *Therapy Goal (Edit Goal, Insert Text) Description Physical Therapy Goals: STG Initiated 12/17/2019 and to be accomplished within 7 day(s) on 12/24/2019: 
1. Patient will move from supine to sit and sit to supine , scoot up and down and roll side to side in bed with moderate assistance . 2.  Patient will transfer from bed to chair and chair to bed with maximal assistance with one person assist using the least restrictive device. 3.  Patient will perform sit to stand with maximal assistance with one person assist using appropriate AD. 4.  Patient will perform static sitting balance without UE support for at least 2 minutes, demonstrating appropriate upright and midline posture at supervision level for ease of preparation for transfers. 5.  Patient will perform wheelchair mobility moderate assist for 150 ft distance over even surfaces. Physical Therapy Goals: LTG Initiated 12/17/2019 and to be accomplished within 28 day(s) on 01/14/2019:  
1. Patient will move from supine to sit and sit to supine , scoot up and down and roll side to side in bed with supervision/set-up. 2.  Patient will transfer from bed to chair and chair to bed with contact guard assist/stand-by assist using the least restrictive device. 3.  Patient will perform sit to stand with minimal assistance/contact guard assist. 
4.  Patient will participate in gait assessment if and when appropriate. 5.  Patient will perform wheelchair mobility over even surfaces at supervision level for at least 150 ft, including negotiation of doorways. 6.  Patient will perform wheelchair mobility up/down ramp, uneven sidewalk min A level. Outcome: Progressing Towards Goal 
 PHYSICAL THERAPY TREATMENT Patient: Varsha Durbin (65 y.o. female) Date: 12/20/2019 Diagnosis: Status post laminectomy with spinal fusion [Z98.1] Status post laminectomy with spinal fusion Precautions:  spinal precautions, 3 LPM O2 via nasal cannula Chart, physical therapy assessment, plan of care and goals were reviewed. Time In: 0930 Time Out: 1030 Time In: 4712 Time Out: 1135 Patient Seen For: Balance activities; Patient education;Transfer training; Therapeutic exercise Pain: 
Patient reporting pre-medicated for pain. Treatment modified and patient cued for appropriate spinal precautions during treatment. Patient identified with name and : yes SUBJECTIVE:  
 
Patient agreeable to treatment, needing re-direction to task at times. OBJECTIVE DATA SUMMARY:  
Objective: BED/MAT MOBILITY Daily Assessment Rolling Right : 3 (Moderate assistance )(needing cues for maintaining spinal precautions) Rolling Left : 3 (Moderate assistance )(needing assist and cues for maintaining spinal precautions) Supine to Sit : 2 (Maximal assistance) Sit to Supine : 2 (Maximal assistance) Using hand rails of bed and edge of mat to assist with rolling trunk and upper body, needing assist from therapist to maintain bilat knees in bent position. TRANSFERS Daily Assessment Transfer Type: Lateral with transfer board Transfer Assistance : 2 (Maximal assistance) Sit to Stand Assistance: Total assistance Car Transfers: Not tested Car Type: N/A Transfers needing significant assist to maintain/facilitate anterior trunk weightshift for improved sacral clearance during scooting along transfer board. Cues and assist for hand placement during transfer and sequencing, needing assist from therapist for re-positioning bilat LE during transfer as well. Sit<->stand with assist x 2, needing bilat knees blocked and assist/cues for increased hip extension and upright upper trunk. Performing for ~ 30 sec bout before needing seated rest.   
 
 
GAIT Daily Assessment Amount of Assistance: 0 (Not tested) Distance (ft): 0 Feet (ft) STEPS or STAIRS Daily Assessment Steps/Stairs Ambulated (#): 0 Level of Assist : 0 (Not tested) BALANCE Daily Assessment Sitting - Static: Fair (occasional) Sitting - Dynamic: Poor (constant support) Standing - Static: Poor;Constant support Standing - Dynamic : Impaired THERAPEUTIC EXERCISES Daily Assessment Extremity: Both Exercise Type #1: Supine lower extremity strengthening(hooklying hip abd/add isometrics) Sets Performed: 3 Reps Performed: 5 Exercise Type #2: Seated lower extremity strengthening(AAROM hip flex, knee extension) Sets Performed: 3 Reps Performed: 5 Level of Assist: Maximum assistance Cues for proper technique, isometric holds for 2-3 sec bouts. Neuro Re-Education: 
Sitting balance emphasis on maintaining upright, midline trunk. Tendency to lose her balance to her left, particularly when attempting dynamic sitting balance task. PT facilitating at the side, hand placement at the sides due to incision at spine and tenderness to touch closer to midline/incision site. Performing static/dynamic sitting balance for up to 3.5 minute duration due to fatigue. Performing x 2 bouts before fatigued, needing to return to room. (Only able to participate in group activity with balloon tapping, tossing/catching beach ball for 15 min duration of sitting edge of mat with intermittent dynamic activity and rests with bilat UE support of edge of mat). ASSESSMENT: 
Patient continues to have significant weakness in her trunk, bilat LE limiting ability to participate in safe and more independent functional mobility. Recommend ongoing intensive skilled PT to her tolerance to progress her toward more independent mobility. Barrier to discharge: 5 stairs to enter her home. Patient continues to be safest at wheelchair level. Progression toward goals: 
[]      Improving appropriately and progressing toward goals [x]      Improving slowly and progressing toward goals []      Not making progress toward goals and plan of care will be adjusted PLAN: 
Patient continues to benefit from skilled intervention to address the above impairments. Continue treatment per established plan of care. Discharge Recommendations:  Home Health with caregiver assistance vs [facility] Further Equipment Recommendations for Discharge:  wheelchair, transfer board currently required. Estimated LOS: 4 weeks Activity Tolerance:  
Fair due to fatigue. SpO2 96% during treatment. After treatment:  
Patient left seated in wheelchair, call button within reach. Bethanie Lora, PT 
12/20/2019

## 2019-12-20 NOTE — PROGRESS NOTES
[address] 
[address] [place] [address], [address] INPATIENT REHABILITATION 
DAILY PROGRESS NOTE Date: 12/20/2019 Name: Airam Valadez Age / Sex: 62 y.o. / female CSN: [id number] MRN: [id number] Admit Date: 12/16/2019 Length of Stay: 4 days Primary Rehab Diagnosis: Impaired Mobility and ADLs secondary to: 
1. S/P T10, T9, T8 laminectomy; T7, T8, T9, T10, T11, T12 posterior thoracic fusion; segmental spinal instrumentation; K2M [place] type, T7-T8, T11-T12 (12/11/2019 - Dr. Ish Joshi) 2. History of acute compression fractures of body of thoracic vertebrae (T9 and T10) due to fall Subjective:  
 
Patient sitting in a chair in NAD, awake, alert, denies sob Objective:  
 
Vital Signs: 
Patient Vitals for the past 24 hrs: 
 BP Temp Pulse Resp SpO2  
12/20/19 1639 99/68 98.5 °F (36.9 °C) 77 18 100 % 12/20/19 0808 106/64 98.8 °F (37.1 °C) 67 18 100 % 12/19/19 2043 121/74 97.1 °F (36.2 °C) 66 16 100 % Physical Examination: 
General:  Awake, alert Cardiovascular:  S1S2+, RRR Pulmonary:  CTA b/l GI:  Soft, BS+, NT, ND Extremities:  trace edema Current Medications: 
Current Facility-Administered Medications Medication Dose Route Frequency  guaiFENesin ER (MUCINEX) tablet 600 mg  600 mg Oral Q12H  
 acetaminophen (TYLENOL) tablet 650 mg  650 mg Oral Q4H PRN  
 bisacodyl (DULCOLAX) tablet 10 mg  10 mg Oral Q48H PRN  
 ferrous sulfate tablet 325 mg  1 Tab Oral DAILY WITH BREAKFAST  ascorbic acid (vitamin C) (VITAMIN C) tablet 250 mg  250 mg Oral DAILY WITH BREAKFAST  insulin lispro (HUMALOG) injection   SubCUTAneous TIDAC  senna-docusate (PERICOLACE) 8.6-50 mg per tablet 2 Tab  2 Tab Oral PCD  methadone (DOLOPHINE) tablet 20 mg  20 mg Oral DAILY  oxyCODONE-acetaminophen (PERCOCET 10)  mg per tablet 1 Tab  1 Tab Oral Q4H PRN  predniSONE (DELTASONE) tablet 30 mg  30 mg Oral DAILY WITH BREAKFAST  insulin glargine (LANTUS) injection 20 Units  20 Units SubCUTAneous ACB  famotidine (PEPCID) tablet 20 mg  20 mg Oral BID  rosuvastatin (CRESTOR) tablet 5 mg  5 mg Oral QHS  albuterol (PROVENTIL VENTOLIN) nebulizer solution 2.5 mg  2.5 mg Nebulization Q4H PRN  
 levothyroxine (SYNTHROID) tablet 75 mcg  75 mcg Oral 6am  
 cholecalciferol (VITAMIN D3) (400 Units /10 mcg) tablet 5 Tab  2,000 Units Oral DAILY  calcium citrate tablet 200 mg [elemental]  200 mg Oral BID  divalproex DR (DEPAKOTE) tablet 500 mg  500 mg Oral QHS  fluticasone-vilanterol (BREO ELLIPTA) 100mcg-25mcg/puff  1 Puff Inhalation DAILY  oxybutynin (DITROPAN) tablet 5 mg  5 mg Oral BID  allopurinol (ZYLOPRIM) tablet 100 mg  100 mg Oral DAILY  sertraline (ZOLOFT) tablet 50 mg  50 mg Oral DAILY  methocarbamol (ROBAXIN) tablet 500 mg  500 mg Oral Q8H  
 aspirin chewable tablet 81 mg  81 mg Oral DAILY WITH BREAKFAST  docusate sodium (COLACE) capsule 100 mg  100 mg Oral DAILY AFTER BREAKFAST Allergies: Allergies Allergen Reactions  Moxifloxacin Rash  Pcn [Penicillins] Swelling  Sulfa (Sulfonamide Antibiotics) Swelling Lab/Data Review: 
Recent Results (from the past 24 hour(s)) GLUCOSE, POC Collection Time: 12/19/19  9:38 PM  
Result Value Ref Range Glucose (POC) 192 (H) 70 - 110 mg/dL GLUCOSE, POC Collection Time: 12/20/19  8:07 AM  
Result Value Ref Range Glucose (POC) 81 70 - 110 mg/dL GLUCOSE, POC Collection Time: 12/20/19 12:02 PM  
Result Value Ref Range Glucose (POC) 179 (H) 70 - 110 mg/dL GLUCOSE, POC Collection Time: 12/20/19  4:58 PM  
Result Value Ref Range Glucose (POC) 232 (H) 70 - 110 mg/dL Assessment:  
 
Primary Rehabilitation Diagnosis 1. Impaired Mobility and ADLs 2.  S/P T10, T9, T8 laminectomy; T7, T8, T9, T10, T11, T12 posterior thoracic fusion; segmental spinal instrumentation; K2M [place] type, T7-T8, T11-T12 (12/11/2019 - Dr. Americo Page) 3. History of acute compression fractures of body of thoracic vertebrae (T9 and T10) due to fall 
  
Comorbidities  Diabetic neuropathy associated with type 2 diabetes mellitus (HCC) E11.40  Venous insufficiency I87.2  Obesity, Class I, BMI 30-34.9 E66.9  Chronic back pain M54.9, G89.29  
 Generalized osteoarthritis of multiple sites M15.9  Bipolar affective disorder  F31.9  Abnormally low high density lipoprotein (HDL) cholesterol with hypertriglyceridemia E78.6, E78.1  Diastolic dysfunction without heart failure I51.89  Chronic obstructive pulmonary disease (COPD)  J44.9  Hypertensive heart disease without heart failure I11.9  Depression F32.9  History of back injury Z87.828  Type 2 diabetes mellitus with diabetic neuropathy, with long-term current use of insulin  E11.40, Z79.4  Anxiety F41.9  Wears glasses Z97.3  History of hepatitis C Z86.19  
 Sickle cell trait D57.3  History of acute renal failure Z87.448  Hypothyroidism E03.9  Recurrent genital herpes A60.00  Sarcoidosis D86.9  Abscess of right arm L02.413  Hypoxemia requiring supplemental oxygen R09.02, Z99.81  
 Abnormal nuclear stress test R94.39  
 Cellulitis and abscess of hand L03.119, L02.519  
 Cellulitis and abscess of foot L03.119, L02.619  
 Cellulitis of arm, right L03. 113  
 Olecranon bursitis of right elbow M70.21  
 Contusion of left elbow S50. 02XA  Intravenous drug user F19.90  Right Achilles tendinitis M76.61  
 History of penicillin allergy Z88.0  Falls frequently R29.6  Gastroesophageal reflux disease with hiatal hernia K21.9, K44.9  Memory difficulty R41.3  Mixed connective tissue disease  M35.1  Hyperuricemia E79.0  History of vitamin D deficiency Z86.39  
 Urge urinary incontinence N39.41  
 Acute blood loss as cause of postoperative anemia D62  
 History of fracture due to fall Z87.81  
  Chronic respiratory failure with hypoxia  J96.11  
 Cellulitis of right forearm L03. 113  
 Gout M10.9  Menopause Z78.0  Long term current use of aspirin Z79.82  
 Opioid dependence  F11.20  Tobacco use disorder F17.200 Plan: 1. Justification for continued stay: Good progression towards established rehabilitation goals. 2. Medical Issues being followed closely: 
  [x]  Fall and safety precautions  
  []  Wound Care  
  [x]  Bowel and Bladder Function  
  [x]  Fluid Electrolyte and Nutrition Balance  
  []  Pain Control 3. Issues that 24 hour rehabilitation nursing is following: 
  [x]  Fall and safety precautions  
  []  Wound Care  
  [x]  Bowel and Bladder Function  
  [x]  Fluid Electrolyte and Nutrition Balance  
  []  Pain Control   
  [x]  Assistance with and education on in-room safety with transfers to and from the bed, wheelchair, toilet and shower. 4. Acute rehabilitation plan of care: 
  [x]  Continue current care and rehab. [x]  Physical Therapy [x]  Occupational Therapy  
        []  Speech Therapy  
  []  Hold Rehab until further notice 5. Medications: 
  [x]  MAR Reviewed [x]  Continue Present Medications 6. DVT Prophylaxis:   
  []  Lovenox  
  []  Unfractionated Heparin  
  []  Coumadin  
  []  NOAC [x]  ROBERT Stockings  
  []  Sequential Compression Device []  None 7. Orders:  
> S/P T10, T9, T8 laminectomy; T7, T8, T9, T10, T11, T12 posterior thoracic fusion; segmental spinal instrumentation; K2M [place] type, T7-T8, T11-T12 (12/11/2019 - Dr. Aquiles Arvizu); History of acute compression fractures of body of thoracic vertebrae (T9 and T10) due to fall 
 >spinal precautions 
 
> Acute Postoperative Blood Loss Anemia 
 > ferrous sulfate 
 
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 
 > monitor > Chronic obstructive pulmonary disease; Chronic respiratory failure with hypoxemia requiring supplemental oxygen (3 LPM) > Continue: On O2, breo ellipta 
 
> Opioid dependence 
 > Continue Methadone 20 mg PO once daily 
 
> Type 2 diabetes mellitus with stage 3 chronic kidney disease 
 >ssi, lantus 
 
 
> Analgesia 
 > Continue: 
  > Acetaminophen 650 mg PO q 4 hr PRN for pain level less than 5/10 
  > Methocarbamol 500 mg PO q 8 hr 
  > Percocet 10/325 1 tab PO q 4 hr PRN for pain level greater than 4/10  
 
> Diet: 
 > Specifications: Cardiac, diabetic consistent carb 1800 kcal, no concentrated sweets, low purine 
 > Solids (consistency): Regular  
 > Liquids (consistency): Thin D/w patient Signed:    Flor Barbosa MD 
 
  December 20, 2019

## 2019-12-20 NOTE — PROGRESS NOTES
Problem: Self Care Deficits Care Plan (Adult) Goal: *Therapy Goal (Edit Goal, Insert Text) Description Occupational Therapy Goals Long Term Goals Initiated 19 and to be accomplished within 4 week(s)  to facilitate increased self care independence and strength for return to PLOF and decreased care giver burden: 2. Pt will perform grooming with Mod I. 
3. Pt will perform UB bathing with SBA. 4. Pt will perform LB bathing with Tyree. 5. Pt will perform tub/shower transfer <> TTB with Min A.  
6. Pt will perform UB dressing with Set-up. 7. Pt will perform LB dressing with Min A. 
8. Pt will perform toileting task with Min A. 
9. Pt will perform toilet transfer with Min A/CGA. Short Term Goals Initiated 19 and to be accomplished within 7 day(s) (19) to facilitate increased self care independence and strength for return to PLOF and decreased care giver burden: 1. Pt will perform self-feeding with Mod I.  
2. Pt will perform grooming with set-up. 3. Pt will perform UB bathing with Mod A. 
4. Pt will perform LB bathing with Mod A in LRE. 5. Pt will perform tub/shower transfer <> TTB with MaxA x 1. 
6. Pt will perform UB dressing with SBA. 7. Pt will perform LB dressing with Mod A using AE as needed. 8. Pt will perform toileting task with Max A x 1. 
9. Pt will perform toilet transfer with MaxA x 1. Outcome Measures: 
(19) Dynamometer  strength: Right= 27.33# (norm=55. 1#) Left= 34.67# (norm=45.7#) Outcome: Progressing Towards Goal 
 OCCUPATIONAL THERAPY TREATMENT Patient: Nirmal Trevino 62 y.o. Patient identified with name and : yes Date: 2019 First Tx Session Time In: 1330 Time Out[de-identified] [id number] Diagnosis: Status post laminectomy with spinal fusion [Z98.1] Precautions:   
Chart, occupational therapy assessment, plan of care, and goals were reviewed. Pain: 
Pt reports 4/10 pain or discomfort prior to treatment. Pt reports 4/10 pain or discomfort post treatment. Intervention Provided: NA; pt did have c/o back spasms during treatment session requiring rest breaks until subsided. SUBJECTIVE:  
Patient stated I really need to do some laundry.  OBJECTIVE DATA SUMMARY:  
 
Pt presented supine in bed with CNA present getting cleaned after incontinent event. Pt performed bed mobility Mod A to sit on EOB. Pt performed UB dressing task seated EOB. Pt then performed slide board transfer from EOB to w/c. THERAPEUTIC ACTIVITY Daily Assessment Pt participated in holiday craft activity, making a sock snowman. Pt was required to perform FM/dexterity to place small items (buttons, eyes, rubber bandsetc) on snowman. Pt then utilized therapy putty for further FM/dexterity, having to manipulate putty bimanually to find small items hidden within. IADL Daily Assessment Pt participated in laundry task. Pt sat w/c level to place clothes into washing machine. This therapist provided assistance to place detergent into machine and to start. Pt participated in folding activity. Pt used clothes in her closet. Pt turned x2 shirts right side out and folded. Pt folded x5 pairs of pants. TOILETING Daily Assessment Toileting Toileting Assistance (FIM Score): 2 (Maximal assistance) Cues: Physical assistance for pants down;Physical assistance for pants up UPPER BODY DRESSING Daily Assessment Upper Body Dressing Dressing Assistance : 3 (Moderate assistance) Comments: Pt doffed/donned pullover shirt seated EOB. Pt required Mod A for balance during movements 2/2 decreased core strength. LOWER BODY DRESSING Daily Assessment Lower Body Dressing Dressing Assistance : 3 (Moderate assistance) Leg Crossed Method Used: No 
Position Performed: Long sitting on bed Comments: Pt bridged to pull pants up over hips/buttocks. MOBILITY/TRANSFERS Daily Assessment Slide board transfer seated EOB to w/c Mod A. ASSESSMENT: 
Pt making slow progress with self-care tasks, including toileting and dressing. Progression toward goals: 
[]          Improving appropriately and progressing toward goals [x]          Improving slowly and progressing toward goals 
[]          Not making progress toward goals and plan of care will be adjusted PLAN: 
Patient continues to benefit from skilled intervention to address the above impairments. Continue treatment per established plan of care. Discharge Recommendations:  Home Health Further Equipment Recommendations for Discharge:  N/A Activity Tolerance: 
Fair Estimated LOS:TBD Please refer to the flowsheet for vital signs taken during this treatment. After treatment:  
[x]  Patient left in no apparent distress sitting up in chair  
[]  Patient left in no apparent distress in bed 
[x]  Call bell left within reach 
[]  Nursing notified 
[]  Caregiver present 
[]  Bed alarm activated COMMUNICATION/EDUCATION:  
[] Home safety education was provided and the patient/caregiver indicated understanding. [] Patient/family have participated as able in goal setting and plan of care. [x] Patient/family agree to work toward stated goals and plan of care. [] Patient understands intent and goals of therapy, but is neutral about his/her participation. [] Patient is unable to participate in goal setting and plan of care.  
 
 
SABINE Martinez/JANNET

## 2019-12-20 NOTE — PROGRESS NOTES
Pt is post op day #9 Thoracic dressing dry Previous drain site dress with small amount of old bloody drainage Nursing marked drainage this morning at 7 am and it has not increased Cautioned pt on BTLs Antonia Adkins NP

## 2019-12-20 NOTE — PROGRESS NOTES
SHIFT CHANGE NOTE FOR Beena Case Bedside and Verbal shift change report given to Paige Balbuena LPN (oncoming nurse) by Kavitha Carmona RN (offgoing nurse). Report included the following information SBAR, Kardex, MAR and Recent Results. Situation: 
 Code Status: Full Code Reason for Admission: Spinal Thoracic laminectomy T 6-T 11 Hospital Day: 4 Problem List:  
Hospital Problems  Date Reviewed: 12/17/2019 Codes Class Noted POA Chronic respiratory failure with hypoxia (HCC) (Chronic) ICD-10-CM: J96.11 
ICD-9-CM: 518.83, 799.02  Unknown Yes Overview Signed 12/16/2019 10:11 PM by Meaghan Farley MD  
  On home oxygen inhalation at 3 LPM via [place] Opioid dependence (HCC) (Chronic) ICD-10-CM: N38.58 ICD-9-CM: 304.00  Unknown Yes Overview Signed 12/16/2019 10:54 PM by Meaghan Farley MD  
  On Methadone Acute blood loss as cause of postoperative anemia ICD-10-CM: D62 
ICD-9-CM: 285.1  12/12/2019 Yes Impaired mobility and ADLs ICD-10-CM: Z74.09 
ICD-9-CM: 799.89  12/11/2019 Yes Spinal cord compression (HCC) ICD-10-CM: G95.20 ICD-9-CM: 336.9  12/11/2019 Yes Compression fracture of body of thoracic vertebra (HCC) ICD-10-CM: S22.000A ICD-9-CM: 805.2  12/11/2019 Yes * (Principal) Status post laminectomy with spinal fusion ICD-10-CM: Z98.1 ICD-9-CM: V45.4  12/11/2019 Yes Overview Signed 12/16/2019 10:05 PM by Meaghan Farley MD  
  S/P T10, T9, T8 laminectomy; T7, T8, T9, T10, T11, T12 posterior thoracic fusion; segmental spinal instrumentation; K2M Davis type, T7-T8, T11-T12 (12/11/2019 - Dr. Wilver Quinteros) History of fracture due to fall ICD-10-CM: Z87.81 ICD-9-CM: V15.51  12/11/2019 Yes Hypoxemia requiring supplemental oxygen (Chronic) ICD-10-CM: R09.02, Z99.81 ICD-9-CM: 799.02  12/29/2014 Yes Chronic obstructive pulmonary disease (COPD) (HCC) (Chronic) ICD-10-CM: J44.9 ICD-9-CM: 689  Unknown Yes Overview Signed 4/23/2013 10:01 AM by Jose CALLES  
  SOB, on paula O2 Recent admission with psychosis Hypertensive heart disease without heart failure (Chronic) ICD-10-CM: I11.9 ICD-9-CM: 402.90  Unknown Yes Overview Signed 4/23/2013 10:02 AM by Alexander Harden Better controlled Type 2 diabetes mellitus with diabetic neuropathy, with long-term current use of insulin (HCC) (Chronic) ICD-10-CM: E11.40, Z79.4 ICD-9-CM: 250.60, 357.2, V58.67  Unknown Yes Overview Signed 12/18/2019  2:13 PM by Isaac Beverly MD  
  HbA1c (12/11/2019) = 7.1 Background: 
 Past Medical History:  
Past Medical History:  
Diagnosis Date  Abnormally low high density lipoprotein (HDL) cholesterol with hypertriglyceridemia Lipid profile (11/6/2016) showed , , HDL 38, LDL 37  Acute blood loss as cause of postoperative anemia 12/12/2019  Anxiety  Bipolar affective disorder ([address] 12/5/2012  Cellulitis of right forearm 05/04/2017  Chronic back pain  Chronic obstructive pulmonary disease (COPD) ([address] SOB, on [place] O2 Recent admission with psychosis  Chronic respiratory failure with hypoxia ([address] On home oxygen inhalation at 3 LPM via [place]  
 Contusion of left elbow 5/4/2017  Depression  Diabetic neuropathy associated with type 2 diabetes mellitus ([address]  Diastolic dysfunction without heart failure Stable on diuretics  Falls frequently  Gastroesophageal reflux disease with hiatal hernia  Generalized osteoarthritis of multiple sites  Gout On Allopurinol  History of acute renal failure 5/31/2013  History of back injury   
 jumped out of second story window  History of fracture due to fall 12/11/2019  
 History of hepatitis C   
 treated  History of penicillin allergy  History of vitamin D deficiency 5/10/2017  Hypertensive heart disease without heart failure Better controlled  Hyperuricemia 5/26/2017  Hypothyroidism  Hypoxemia requiring supplemental oxygen 12/29/2014  Intravenous drug user 5/2/2017  Long term current use of aspirin  Memory difficulty  Menopause  Mixed connective tissue disease ([facility] 5/10/2017  Obesity, Class I, BMI 30-34.9  Olecranon bursitis of right elbow 5/4/2017  Opioid dependence ([facility] On Methadone  Recurrent genital herpes 5/31/2013  Right Achilles tendinitis 5/2/2017  Sarcoidosis  Sickle cell trait ([facility]  Tobacco use disorder  Type 2 diabetes mellitus with diabetic neuropathy, with long-term current use of insulin ([facility] HbA1c (12/11/2019) = 7.1  Urge urinary incontinence 5/10/2017  Venous insufficiency  Wears glasses Patient taking anticoagulants no Patient has a defibrillator: no  
 
Assessment: 
? Changes in Assessment throughout shift: None ? Patient has central line: no Reasons if yes: Removed 12/16/19 Insertion date:n/a Last dressing date:n/a 
? Patient has Renae Cath: no Reasons if yes: n/a Insertion date:n/a 
 
? Last Vitals: 
  
Vitals:  
 12/19/19 1605 12/19/19 2043 12/20/19 1532 12/20/19 1639 BP: 111/61 121/74 106/64 99/68 Pulse: 71 66 67 77 Resp: 19 16 18 18 Temp: 97 °F (36.1 °C) 97.1 °F (36.2 °C) 98.8 °F (37.1 °C) 98.5 °F (36.9 °C) SpO2: 98% 100% 100% 100% LMP: 11/25/2012  
 
 
? PAIN Pain Assessment Pain Intensity 1: 6 (12/20/19 1610) Pain Intensity 1: 2 (12/29/14 1105) Pain Location 1: Back Pain Location 1: Abdomen Pain Intervention(s) 1: Medication (see MAR) Pain Intervention(s) 1: Medication (see MAR) Patient Stated Pain Goal: 0 Patient Stated Pain Goal: 0 
o Intervention effective: yes   
o Other actions taken for pain: no c/o 
 
? Skin Assessment Skin color Skin Color: Appropriate for ethnicity Condition/Temperature Skin Condition/Temp: Warm, Dry Integrity Skin Integrity: Intact Turgor Turgor: Non-tenting Weekly Pressure Ulcer Documentation  Pressure  Injury Documentation: No Pressure Injury Noted-Pressure Ulcer Prevention Initiated Wound Prevention & Protection Methods Orientation of wound Orientation of Wound Prevention: Posterior Location of Prevention Location of Wound Prevention: Buttocks, Sacrum/Coccyx Dressing Present Dressing Present : No 
Dressing Status Wound Offloading Wound Offloading (Prevention Methods): Bed, pressure redistribution/air, Repositioning ? INTAKE/OUPUT Date 12/19/19 0700 - 12/20/19 2594 12/20/19 0700 - 12/21/19 4254 Shift 0700-1859 1900-0659 24 Hour Total 0700-1859 1900-0659 24 Hour Total  
INTAKE  
P.O.  160 160     
  P. O.  160 160 Shift Total  160 160 OUTPUT Urine Urine Occurrence(s) 3 x 3 x 6 x 1 x  1 x Stool Stool Occurrence(s) 1 x 1 x 2 x 1 x  1 x Shift Total        
NET  160 160 Weight (kg) Recommendations: 1. Patient needs and requests: met 2. Diet: Cardiac DM Reg Roylene Lecher liq 3. Pending tests/procedures: none 4. Functional Level/Equipment: wheelchair 5. Estimated Discharge Date: TBD Posted on Whiteboard in Patients Room: yes HEALS Safety Check A safety check occurred in the patient's room between off going nurse and oncoming nurse listed above. The safety check included the below items Area Items H High Alert Medications ? Verify all high alert medication drips (heparin, PCA, etc.) E Equipment ? Suction is set up for ALL patients (with yanker) ? Red plugs utilized for all equipment (IV pumps, etc.) ? WOWs wiped down at end of shift. ? Room stocked with oxygen, suction, and other unit-specific supplies A Alarms ? Bed alarm is set for fall risk patients ? Ensure chair alarm is in place and activated if patient is up in a chair L Lines ? Check IV for any infiltration ? Renae bag is empty if patient has a Renae ? Tubing and IV bags are labeled Maegan Po Safety ? Room is clean, patient is clean, and equipment is clean. ? Hallways are clear from equipment besides carts. ? Fall bracelet on for fall risk patients ? Ensure room is clear and free of clutter ? Suction is set up for ALL patients (with lashay) ? Hallways are clear from equipment besides carts. ? Isolation precautions followed, supplies available outside room, sign posted

## 2019-12-20 NOTE — PROGRESS NOTES
Problem: Diabetes Self-Management Goal: *Disease process and treatment process Description Define diabetes and identify own type of diabetes; list 3 options for treating diabetes. Outcome: Progressing Towards Goal 
Goal: *Incorporating nutritional management into lifestyle Description Describe effect of type, amount and timing of food on blood glucose; list 3 methods for planning meals. Outcome: Progressing Towards Goal 
Goal: *Incorporating physical activity into lifestyle Description State effect of exercise on blood glucose levels. Outcome: Progressing Towards Goal 
Goal: *Developing strategies to promote health/change behavior Description Define the ABC's of diabetes; identify appropriate screenings, schedule and personal plan for screenings. Outcome: Progressing Towards Goal 
Goal: *Using medications safely Description State effect of diabetes medications on diabetes; name diabetes medication taking, action and side effects. Outcome: Progressing Towards Goal 
Goal: *Monitoring blood glucose, interpreting and using results Description Identify recommended blood glucose targets  and personal targets. Outcome: Progressing Towards Goal 
Goal: *Prevention, detection, treatment of acute complications Description List symptoms of hyper- and hypoglycemia; describe how to treat low blood sugar and actions for lowering  high blood glucose level. Outcome: Progressing Towards Goal 
Goal: *Prevention, detection and treatment of chronic complications Description Define the natural course of diabetes and describe the relationship of blood glucose levels to long term complications of diabetes. Outcome: Progressing Towards Goal 
Goal: *Developing strategies to address psychosocial issues Description Describe feelings about living with diabetes; identify support needed and support network Outcome: Progressing Towards Goal 
Goal: *Insulin pump training Outcome: Progressing Towards Goal 
 Goal: *Sick day guidelines Outcome: Progressing Towards Goal 
Goal: *Patient Specific Goal (EDIT GOAL, INSERT TEXT) Outcome: Progressing Towards Goal 
  
Problem: Patient Education: Go to Patient Education Activity Goal: Patient/Family Education Outcome: Progressing Towards Goal 
  
Problem: Falls - Risk of 
Goal: *Absence of Falls Description Document Lima Ellis Fall Risk and appropriate interventions in the flowsheet. Outcome: Progressing Towards Goal 
Note: Fall Risk Interventions: 
Mobility Interventions: Patient to call before getting OOB Medication Interventions: Evaluate medications/consider consulting pharmacy Elimination Interventions: Bed/chair exit alarm, Call light in reach History of Falls Interventions: Bed/chair exit alarm, Evaluate medications/consider consulting pharmacy Problem: Patient Education: Go to Patient Education Activity Goal: Patient/Family Education Outcome: Progressing Towards Goal 
  
Problem: Pressure Injury - Risk of 
Goal: *Prevention of pressure injury Description Document Florian Scale and appropriate interventions in the flowsheet. Outcome: Progressing Towards Goal 
Note: Pressure Injury Interventions: 
Sensory Interventions: Assess changes in LOC, Keep linens dry and wrinkle-free, Maintain/enhance activity level Moisture Interventions: Absorbent underpads, Check for incontinence Q2 hours and as needed Activity Interventions: Increase time out of bed, Pressure redistribution bed/mattress(bed type) Mobility Interventions: Pressure redistribution bed/mattress (bed type) Nutrition Interventions: Document food/fluid/supplement intake Friction and Shear Interventions: Minimize layers Problem: Patient Education: Go to Patient Education Activity Goal: Patient/Family Education Outcome: Progressing Towards Goal 
  
Problem: Infection - Risk of, Surgical Site Infection Goal: *Absence of surgical site infection signs and symptoms Outcome: Progressing Towards Goal 
  
Problem: Patient Education: Go to Patient Education Activity Goal: Patient/Family Education Outcome: Progressing Towards Goal 
  
Problem: Pain Goal: *Control of Pain Outcome: Progressing Towards Goal 
Goal: *PALLIATIVE CARE:  Alleviation of Pain Outcome: Progressing Towards Goal 
  
Problem: Patient Education: Go to Patient Education Activity Goal: Patient/Family Education Outcome: Progressing Towards Goal 
  
Problem: Nutrition Deficit Goal: *Optimize nutritional status Outcome: Progressing Towards Goal 
  
Problem: Patient Education: Go to Patient Education Activity Goal: Patient/Family Education Outcome: Progressing Towards Goal 
  
Problem: Patient Education: Go to Patient Education Activity Goal: Patient/Family Education Outcome: Progressing Towards Goal

## 2019-12-20 NOTE — ROUTINE PROCESS
1927 Assumed care of patient from [facility] (off going). Patient resting in bed with no distress. Bed in lowest position, side rails up x3, call bell and personal belongings within reach. Will continue to monitor. 715  Bedside shift change report given to [address] [place] [address] (oncoming nurse) by Carey Flores RN (offgoing nurse). Report included the following information SBAR, Kardex, Intake/Output, MAR and Recent Results.

## 2019-12-20 NOTE — PROGRESS NOTES
[x] Psychology  [] Social Work [] Recreational Therapy INTERVENTION  UNITS/TIME OF SERVICE Assessment Supportive Counseling  December 19, 2019 Orientation Discharge Planning Resource Linkage Progress/Current Status Patient attended support group on ARU where patients were educated about what to expect post ARU and in their outpatient recovery. Patients were encouraged to consider this treatment effort as only their first step toward recovery. Patient was previously on ARU and was able to reflect on her personal experiences with outpatient recovery post hospital which was helpful for patients to hear, in terms of their own situations. Patient is quite verbose and sometimes monopolizes conversation; thus, she oftentimes needs to be \"stopped\" or redirected in her conversation.  
 
Alvina Gutierrez, [facility] 12/20/2019 12:08 PM

## 2019-12-21 NOTE — PROGRESS NOTES
Progress Note Patient: Miesha Bush MRN: [id number]  CSN: [id number] YOB: 1955  Age: 62 y.o. Sex: female DOA: 12/16/2019 LOS:  LOS: 5 days Subjective: No acute complaints. Does report constipation however feels this is improving and having bowel movement today. Primary Rehab Diagnosis: Impaired Mobility and ADLs secondary to: 
1. S/P T10, T9, T8 laminectomy; T7, T8, T9, T10, T11, T12 posterior thoracic fusion; segmental spinal instrumentation; K2M [place] type, T7-T8, T11-T12 (12/11/2019 - Dr. Michelle Torres) 2. History of acute compression fractures of body of thoracic vertebrae (T9 and T10) due to fall Review of systems General: No fevers or chills. Cardiovascular: No chest pain or pressure. No palpitations. Pulmonary: COPD, Sarcoidosis on chronic O2 Gastrointestinal: No abdominal pain, nausea, vomiting or diarrhea. +constipation improving Genitourinary: No urinary frequency, urgency, hesitancy or dysuria. Musculoskeletal: Thoracic spinal surgery as above Neurologic: BLE weakness Objective:  
 
Physical Exam: 
Visit Vitals /84 Pulse 71 Temp 98.5 °F (36.9 °C) Resp 18 SpO2 100% Breastfeeding No  
  
 
General:         Alert, cooperative, no acute distress   
HEENT: NC, Atraumatic. PERRLA, anicteric sclerae. Lungs: CTA Bilaterally. No Wheezing/Rhonchi/Rales. Heart:  Regular  rhythm,  No murmur, No Rubs, No Gallops Abdomen: Soft, Non distended, Non tender.  +Bowel sounds, no HSM Extremities: No edema Psych:   Not anxious or agitated. Neurologic:  CN 2-12 grossly intact, Alert and oriented X 3. No gross sensory deficit.  Motor strength is 4-/5 on BUE, 2+/5 on the RLE (except for 1/5 on the right ankle), 2-/5 on the LLE (except for 2+/5 on the left knee). Intake and Output: 
Current Shift:  No intake/output data recorded. Last three shifts:  12/19 1901 - 12/21 0700 In: 160 [P.O.:160] Out: -  
 
Labs: Results: Chemistry No results for input(s): GLU, NA, K, CL, CO2, BUN, CREA, CA, AGAP, BUCR, TBIL, GPT, AP, TP, ALB, GLOB, AGRAT in the last 72 hours. CBC w/Diff No results for input(s): WBC, RBC, HGB, HCT, PLT, GRANS, LYMPH, EOS, HGBEXT, HCTEXT, PLTEXT in the last 72 hours. Cardiac Enzymes No results for input(s): CPK, CKND1, SANTOS in the last 72 hours. No lab exists for component: [place] Coagulation No results for input(s): PTP, INR, APTT, INREXT in the last 72 hours. Lipid Panel Lab Results Component Value Date/Time Cholesterol, total 141 09/30/2019 12:00 AM  
 HDL Cholesterol 39 (L) 09/30/2019 12:00 AM  
 LDL, calculated 61 09/30/2019 12:00 AM  
 VLDL, calculated 41 (H) 09/30/2019 12:00 AM  
 Triglyceride 204 (H) 09/30/2019 12:00 AM  
 CHOL/HDL Ratio 3.5 11/06/2016 04:18 AM  
  
BNP No results for input(s): BNPP in the last 72 hours. Liver Enzymes No results for input(s): TP, ALB, TBIL, AP, SGOT, GPT in the last 72 hours. No lab exists for component: DBIL Thyroid Studies Lab Results Component Value Date/Time TSH 0.74 12/11/2019 05:37 PM  
    
 
Procedures/imaging: see electronic medical records for all procedures/Xrays and details which were not copied into this note but were reviewed prior to creation of Plan Medications:  
Current Facility-Administered Medications Medication Dose Route Frequency  .WEIGHT DOCUMENTATION REQUIRED  1 Each Other ONCE  
 guaiFENesin ER (MUCINEX) tablet 600 mg  600 mg Oral Q12H  
 acetaminophen (TYLENOL) tablet 650 mg  650 mg Oral Q4H PRN  
 bisacodyl (DULCOLAX) tablet 10 mg  10 mg Oral Q48H PRN  
 ferrous sulfate tablet 325 mg  1 Tab Oral DAILY WITH BREAKFAST  ascorbic acid (vitamin C) (VITAMIN C) tablet 250 mg  250 mg Oral DAILY WITH BREAKFAST  insulin lispro (HUMALOG) injection   SubCUTAneous TIDAC  senna-docusate (PERICOLACE) 8.6-50 mg per tablet 2 Tab  2 Tab Oral PCD  methadone (DOLOPHINE) tablet 20 mg  20 mg Oral DAILY  oxyCODONE-acetaminophen (PERCOCET 10)  mg per tablet 1 Tab  1 Tab Oral Q4H PRN  predniSONE (DELTASONE) tablet 30 mg  30 mg Oral DAILY WITH BREAKFAST  insulin glargine (LANTUS) injection 20 Units  20 Units SubCUTAneous ACB  famotidine (PEPCID) tablet 20 mg  20 mg Oral BID  rosuvastatin (CRESTOR) tablet 5 mg  5 mg Oral QHS  albuterol (PROVENTIL VENTOLIN) nebulizer solution 2.5 mg  2.5 mg Nebulization Q4H PRN  
 levothyroxine (SYNTHROID) tablet 75 mcg  75 mcg Oral 6am  
 cholecalciferol (VITAMIN D3) (400 Units /10 mcg) tablet 5 Tab  2,000 Units Oral DAILY  calcium citrate tablet 200 mg [elemental]  200 mg Oral BID  divalproex DR (DEPAKOTE) tablet 500 mg  500 mg Oral QHS  fluticasone-vilanterol (BREO ELLIPTA) 100mcg-25mcg/puff  1 Puff Inhalation DAILY  oxybutynin (DITROPAN) tablet 5 mg  5 mg Oral BID  allopurinol (ZYLOPRIM) tablet 100 mg  100 mg Oral DAILY  sertraline (ZOLOFT) tablet 50 mg  50 mg Oral DAILY  methocarbamol (ROBAXIN) tablet 500 mg  500 mg Oral Q8H  
 aspirin chewable tablet 81 mg  81 mg Oral DAILY WITH BREAKFAST  docusate sodium (COLACE) capsule 100 mg  100 mg Oral DAILY AFTER BREAKFAST Assessment/Plan Principal Problem: 
  Status post laminectomy with spinal fusion (12/11/2019) Overview: S/P T10, T9, T8 laminectomy; T7, T8, T9, T10, T11, T12 posterior thoracic  
    fusion; segmental spinal instrumentation; K2M [place] type, T7-T8, T11-T12  
    (12/11/2019 - Dr. Ish Joshi) Active Problems: 
  Chronic obstructive pulmonary disease (COPD) (HCC) () Overview: SOB, on [place] O2 Recent admission with psychosis Hypertensive heart disease without heart failure () Overview: Better controlled Type 2 diabetes mellitus with diabetic neuropathy, with long-term current use of insulin (HCC) () Overview: HbA1c (12/11/2019) = 7.1 Hypoxemia requiring supplemental oxygen (12/29/2014) Impaired mobility and ADLs (12/11/2019) Spinal cord compression ([address] (12/11/2019) Compression fracture of body of thoracic vertebra (HCC) (12/11/2019) Acute blood loss as cause of postoperative anemia (12/12/2019) History of fracture due to fall (12/11/2019) Chronic respiratory failure with hypoxia (HCC) () Overview: On home oxygen inhalation at 3 LPM via [place] Opioid dependence (HCC) () Overview: On Methadone S/P T10, T9, T8 laminectomy; T7, T8, T9, T10, T11, T12 posterior thoracic fusion; segmental spinal instrumentation; K2M [place] type, T7-T8, T11-T12 (12/11/2019 - Dr. Lashaun Horan); History of acute compression fractures of body of thoracic vertebrae (T9 and T10) due to fall 
spinal precautions Pain control with Acetaminophen 650 mg PO q 4 hr PRN for pain level less than 5/10, Methocarbamol 500 mg PO q 8 hr, Percocet 10/325 1 tab PO q 4 hr PRN for pain level greater than 4/10 
  
Acute Postoperative Blood Loss Anemia Stable, continue ferrous sulfate 
  
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 congestive heart failure BP controlled off medication, monitor 
  
Chronic obstructive pulmonary disease; Chronic respiratory failure with hypoxemia requiring supplemental oxygen (3 LPM) Continue supplemental O2, breo ellipta 
  
Opioid dependence Continue Methadone 20 mg PO once daily Continue bowel regimen with colace, pericolace, monitor constipation 
  
Type 2 diabetes mellitus with stage 3 chronic kidney disease FSG have been elevated, continue SSI, increase lantus from 20 units daily to 22 units daily Monitor and adjust as appropriate 
  
 
Eddi Munoz MD 
12/21/2019 2:21 PM

## 2019-12-21 NOTE — PROGRESS NOTES
SHIFT CHANGE NOTE FOR Arlyn Escobar Bedside and Verbal shift change report given to Ligia Gomez RN (oncoming nurse) by Josr Bridges LPN (offgoing nurse). Report included the following information SBAR, Kardex, MAR and Recent Results. Situation: 
 Code Status: Full Code Reason for Admission: Spinal Thoracic laminectomy T 6-T 11 Hospital Day: 4 Problem List:  
Hospital Problems  Date Reviewed: 12/17/2019 Codes Class Noted POA Chronic respiratory failure with hypoxia (HCC) (Chronic) ICD-10-CM: J96.11 
ICD-9-CM: 518.83, 799.02  Unknown Yes Overview Signed 12/16/2019 10:11 PM by Sonja Whitaker MD  
  On home oxygen inhalation at 3 LPM via [place] Opioid dependence (HCC) (Chronic) ICD-10-CM: W41.97 ICD-9-CM: 304.00  Unknown Yes Overview Signed 12/16/2019 10:54 PM by Sonja Whitaker MD  
  On Methadone Acute blood loss as cause of postoperative anemia ICD-10-CM: D62 
ICD-9-CM: 285.1  12/12/2019 Yes Impaired mobility and ADLs ICD-10-CM: Z74.09 
ICD-9-CM: 799.89  12/11/2019 Yes Spinal cord compression (HCC) ICD-10-CM: G95.20 ICD-9-CM: 336.9  12/11/2019 Yes Compression fracture of body of thoracic vertebra (HCC) ICD-10-CM: S22.000A ICD-9-CM: 805.2  12/11/2019 Yes * (Principal) Status post laminectomy with spinal fusion ICD-10-CM: Z98.1 ICD-9-CM: V45.4  12/11/2019 Yes Overview Signed 12/16/2019 10:05 PM by Sonja Whitaker MD  
  S/P T10, T9, T8 laminectomy; T7, T8, T9, T10, T11, T12 posterior thoracic fusion; segmental spinal instrumentation; K2M [place] type, T7-T8, T11-T12 (12/11/2019 - Dr. Aquiles Arvizu) History of fracture due to fall ICD-10-CM: Z87.81 ICD-9-CM: V15.51  12/11/2019 Yes Hypoxemia requiring supplemental oxygen (Chronic) ICD-10-CM: R09.02, Z99.81 ICD-9-CM: 799.02  12/29/2014 Yes Chronic obstructive pulmonary disease (COPD) (HCC) (Chronic) ICD-10-CM: J44.9 ICD-9-CM: 641  Unknown Yes Overview Signed 4/23/2013 10:01 AM by Dhiraj CALLES  
  SOB, on [place] O[place] Recent admission with psychosis Hypertensive heart disease without heart failure (Chronic) ICD-10-CM: I11.9 ICD-9-CM: 402.90  Unknown Yes Overview Signed 4/23/2013 10:02 AM by Pedro Naylor Better controlled Type 2 diabetes mellitus with diabetic neuropathy, with long-term current use of insulin (HCC) (Chronic) ICD-10-CM: E11.40, Z79.4 ICD-9-CM: 250.60, 357.2, V58.67  Unknown Yes Overview Signed 12/18/2019  2:13 PM by Shoshana Aparicio MD  
  HbA1c (12/11/2019) = 7.1 Background: 
 Past Medical History:  
Past Medical History:  
Diagnosis Date  Abnormally low high density lipoprotein (HDL) cholesterol with hypertriglyceridemia Lipid profile (11/6/2016) showed , , HDL 38, LDL 37  Acute blood loss as cause of postoperative anemia 12/12/2019  Anxiety  Bipolar affective disorder ([address] 12/5/2012  Cellulitis of right forearm 05/04/2017  Chronic back pain  Chronic obstructive pulmonary disease (COPD) ([address] SOB, on [place] O2 Recent admission with psychosis  Chronic respiratory failure with hypoxia ([address] On home oxygen inhalation at 3 LPM via [place]  
 Contusion of left elbow 5/4/2017  Depression  Diabetic neuropathy associated with type 2 diabetes mellitus ([address]  Diastolic dysfunction without heart failure Stable on diuretics  Falls frequently  Gastroesophageal reflux disease with hiatal hernia  Generalized osteoarthritis of multiple sites  Gout On Allopurinol  History of acute renal failure 5/31/2013  History of back injury   
 jumped out of second story window  History of fracture due to fall 12/11/2019  
 History of hepatitis C   
 treated  History of penicillin allergy  History of vitamin D deficiency 5/10/2017  Hypertensive heart disease without heart failure Better controlled  Hyperuricemia 5/26/2017  Hypothyroidism  Hypoxemia requiring supplemental oxygen 12/29/2014  Intravenous drug user 5/2/2017  Long term current use of aspirin  Memory difficulty  Menopause  Mixed connective tissue disease [address] 5/10/2017  Obesity, Class I, BMI 30-34.9  Olecranon bursitis of right elbow 5/4/2017  Opioid dependence ([facility] On Methadone  Recurrent genital herpes 5/31/2013  Right Achilles tendinitis 5/2/2017  Sarcoidosis  Sickle cell trait ([facility]  Tobacco use disorder  Type 2 diabetes mellitus with diabetic neuropathy, with long-term current use of insulin ([facility] HbA1c (12/11/2019) = 7.1  Urge urinary incontinence 5/10/2017  Venous insufficiency  Wears glasses Patient taking anticoagulants no Patient has a defibrillator: no  
 
Assessment: 
? Changes in Assessment throughout shift: None ? Patient has central line: no Reasons if yes: Removed 12/16/19 Insertion date:n/a Last dressing date:n/a 
? Patient has Renae Cath: no Reasons if yes: n/a Insertion date:n/a 
 
? Last Vitals: 
  
Vitals:  
 12/19/19 1605 12/19/19 2043 12/20/19 7724 12/20/19 1639 BP: 111/61 121/74 106/64 99/68 Pulse: 71 66 67 77 Resp: 19 16 18 18 Temp: 97 °F (36.1 °C) 97.1 °F (36.2 °C) 98.8 °F (37.1 °C) 98.5 °F (36.9 °C) SpO2: 98% 100% 100% 100% LMP: 11/25/2012  
 
 
? PAIN Pain Assessment Pain Intensity 1: 0 (12/20/19 2000) Pain Intensity 1: 2 (12/29/14 1105) Pain Location 1: Back Pain Location 1: Abdomen Pain Intervention(s) 1: Medication (see MAR) Pain Intervention(s) 1: Medication (see MAR) Patient Stated Pain Goal: 0 Patient Stated Pain Goal: 0 
o Intervention effective: yes   
o Other actions taken for pain: no c/o 
 
? Skin Assessment Skin color Skin Color: Appropriate for ethnicity Condition/Temperature Skin Condition/Temp: Warm, Dry Integrity Skin Integrity: Intact Turgor Turgor: Non-tenting Weekly Pressure Ulcer Documentation  Pressure  Injury Documentation: No Pressure Injury Noted-Pressure Ulcer Prevention Initiated Wound Prevention & Protection Methods Orientation of wound Orientation of Wound Prevention: Posterior Location of Prevention Location of Wound Prevention: Buttocks, Sacrum/Coccyx Dressing Present Dressing Present : No 
Dressing Status Wound Offloading Wound Offloading (Prevention Methods): Bed, pressure redistribution/air, Repositioning ? INTAKE/OUPUT Date 12/19/19 1900 - 12/20/19 0185 12/20/19 0700 - 12/21/19 8773 Shift 1900-0659 24 Hour Total 7739-8612 0154-7841 24 Hour Total  
INTAKE  
P.O. 160 160     
  P. O. 160 160 Shift Total 160 160 OUTPUT Urine Urine Occurrence(s) 3 x 6 x 1 x  1 x Stool Stool Occurrence(s) 1 x 2 x 1 x  1 x Shift Total       
 160 Weight (kg) Recommendations: 1. Patient needs and requests: met 2. Diet: Cardiac DM Reg Lesta Canavan liq 3. Pending tests/procedures: none 4. Functional Level/Equipment: wheelchair 5. Estimated Discharge Date: TBD Posted on Whiteboard in Patients Room: yes HEALS Safety Check A safety check occurred in the patient's room between off going nurse and oncoming nurse listed above. The safety check included the below items Area Items H High Alert Medications ? Verify all high alert medication drips (heparin, PCA, etc.) E Equipment ? Suction is set up for ALL patients (with yanker) ? Red plugs utilized for all equipment (IV pumps, etc.) ? WOWs wiped down at end of shift. ? Room stocked with oxygen, suction, and other unit-specific supplies A Alarms ? Bed alarm is set for fall risk patients ? Ensure chair alarm is in place and activated if patient is up in a chair L Lines ? Check IV for any infiltration ? Renae bag is empty if patient has a Renae ? Tubing and IV bags are labeled Ramila Adjutant Safety ? Room is clean, patient is clean, and equipment is clean. ? Hallways are clear from equipment besides carts. ? Fall bracelet on for fall risk patients ? Ensure room is clear and free of clutter ? Suction is set up for ALL patients (with lashay) ? Hallways are clear from equipment besides carts. ? Isolation precautions followed, supplies available outside room, sign posted

## 2019-12-21 NOTE — PROGRESS NOTES
Problem: Self Care Deficits Care Plan (Adult) Goal: *Therapy Goal (Edit Goal, Insert Text) Description Occupational Therapy Goals Long Term Goals Initiated 19 and to be accomplished within 4 week(s)  to facilitate increased self care independence and strength for return to PLOF and decreased care giver burden: 2. Pt will perform grooming with Mod I. 
3. Pt will perform UB bathing with SBA. 4. Pt will perform LB bathing with Tyree. 5. Pt will perform tub/shower transfer <> TTB with Min A.  
6. Pt will perform UB dressing with Set-up. 7. Pt will perform LB dressing with Min A. 
8. Pt will perform toileting task with Min A. 
9. Pt will perform toilet transfer with Min A/CGA. Short Term Goals Initiated 19 and to be accomplished within 7 day(s) (19) to facilitate increased self care independence and strength for return to PLOF and decreased care giver burden: 1. Pt will perform self-feeding with Mod I.  
2. Pt will perform grooming with set-up. 3. Pt will perform UB bathing with Mod A. 
4. Pt will perform LB bathing with Mod A in LRE. 5. Pt will perform tub/shower transfer <> TTB with MaxA x 1. 
6. Pt will perform UB dressing with SBA. 7. Pt will perform LB dressing with Mod A using AE as needed. 8. Pt will perform toileting task with Max A x 1. 
9. Pt will perform toilet transfer with MaxA x 1. Outcome Measures: 
(19) Dynamometer  strength: Right= 27.33# (norm=55. 1#) Left= 34.67# (norm=45.7#) Outcome: Progressing Towards Goal 
Goal: *Therapy Goal (Edit Goal, Insert Text) Outcome: Progressing Towards Goal 
Goal: Interventions Outcome: Progressing Towards Goal 
OCCUPATIONAL THERAPY TREATMENT Patient: Chanelle Fields 62 y.o. Patient identified with name and : Natalee Lucas Date: 2019 First Tx Session Time In: 900 Time Out[de-identified] 705 Second Tx Session Time In: 1200 Time Out[de-identified] 9592 Diagnosis: Status post laminectomy with spinal fusion [Z98.1] Precautions:   
Chart, occupational therapy assessment, plan of care, and goals were reviewed. Pain: 
Pt reports 8/10 pain or discomfort prior to treatment. Pt reports 7/10 pain or discomfort post treatment. Intervention Provided: Pt received pain med's prior to treatment session.' SUBJECTIVE:  
Patient stated Sintia Villanueva is on the upper lift side. I can fee when I need to pee  OBJECTIVE DATA SUMMARY:  
Pt seen supine in bed upon arrival in room for bath,pt  was dress by nursing earlier. Pt  asked for a bed pan, states 'I can feel like I want to pee'. ~ 15 minutes unproductive 2/2 pt on bedpan. Second session: Pt participated with therapeutic activity on table top. THERAPEUTIC ACTIVITY Daily Assessment Pt participated with HAILY at table demonstration reaching with flexion from the waist with intermittently unsupportive sitting for core strengthening. FEEDING/EATING Daily Assessment Feeding/Eating Feeding/Eating Assistance: 5 (Supervision/setup) Comments: Pt set-up with opening creamer 2/2 decrease FM skills per pt. Than self feed independently. GROOMING Daily Assessment Grooming Grooming Assistance : 6 (Modified independent) Comments: Pt apply poly  to dentures, inserting dentures into mouth independently seated at sink LOWER BODY BATHING Daily Assessment Lower Body Bathing Bathing Assistance, Lower : 3 (Moderate assistance ) Position Performed: Long sitting on bed Comments: Pt washed tyrell area with set-up than rolled with min assist to have buttocks wash dependently. TOILETING Daily Assessment Toileting Toileting Assistance (FIM Score): 2 (Maximal assistance 2/2 doff/shante brief and performing hygiene. Adaptive Equipment: Other (comment) UPPER BODY DRESSING Daily Assessment Upper Body Dressing Dressing Assistance : 4 (Minimal assistance) Comments: Pt doffed shirt using [employer] tech with min assist, donning shirt with assist on pulling shirt down at the back. LOWER BODY DRESSING Daily Assessment Lower Body Dressing Dressing Assistance : 3 (Moderate assistance) Leg Crossed Method Used: No 
Position Performed: Long sitting in bed Comments: Pt pull up pants from thigh to waist over belly. Pt rolled with min assist into side line as pants donned over buttocks. MOBILITY/TRANSFERS Daily Assessment Bed mobility to EOB grossly min assist. Transfer to wheel chair to sliding board with mod assist,verbal cuing for . ASSESSMENT: Pt req'd encouragement to increase participation. Pt making slow progress with self care. Progression toward goals: 
[]          Improving appropriately and progressing toward goals [x]          Improving slowly and progressing toward goals 
[]          Not making progress toward goals and plan of care will be adjusted PLAN: 
Patient continues to benefit from skilled intervention to address the above impairments. Continue treatment per established plan of care. Discharge Recommendations: Home Health Further Equipment Recommendations for Discharge:3: 1 commode pt  has shower bench Activity Tolerance: 
Fair Estimated LOS: 1/4 2020 Please refer to the flow sheet for vital signs taken during this treatment. After treatment:  
[]  Patient left in no apparent distress sitting up in chair  
[]  Patient left in no apparent distress in bed 
[x]  Call bell left within reach 
[]  Nursing notified 
[]  Caregiver present 
[]  Bed alarm activated COMMUNICATION/EDUCATION:  
[] Home safety education was provided and the patient/caregiver indicated understanding. [] Patient/family have participated as able in goal setting and plan of care. [x] Patient/family agree to work toward stated goals and plan of care. [] Patient understands intent and goals of therapy, but is neutral about his/her participation. [] Patient is unable to participate in goal setting and plan of care. Davis Vitale  REGALADO/L 
12/21/2019

## 2019-12-21 NOTE — PROGRESS NOTES
Problem: Diabetes Self-Management Goal: *Disease process and treatment process Description Define diabetes and identify own type of diabetes; list 3 options for treating diabetes. Outcome: Progressing Towards Goal 
Goal: *Incorporating nutritional management into lifestyle Description Describe effect of type, amount and timing of food on blood glucose; list 3 methods for planning meals. Outcome: Progressing Towards Goal 
Goal: *Incorporating physical activity into lifestyle Description State effect of exercise on blood glucose levels. Outcome: Progressing Towards Goal 
Goal: *Developing strategies to promote health/change behavior Description Define the ABC's of diabetes; identify appropriate screenings, schedule and personal plan for screenings. Outcome: Progressing Towards Goal 
Goal: *Using medications safely Description State effect of diabetes medications on diabetes; name diabetes medication taking, action and side effects. Outcome: Progressing Towards Goal 
Goal: *Monitoring blood glucose, interpreting and using results Description Identify recommended blood glucose targets  and personal targets. Outcome: Progressing Towards Goal 
Goal: *Prevention, detection, treatment of acute complications Description List symptoms of hyper- and hypoglycemia; describe how to treat low blood sugar and actions for lowering  high blood glucose level. Outcome: Progressing Towards Goal 
Goal: *Prevention, detection and treatment of chronic complications Description Define the natural course of diabetes and describe the relationship of blood glucose levels to long term complications of diabetes. Outcome: Progressing Towards Goal 
Goal: *Developing strategies to address psychosocial issues Description Describe feelings about living with diabetes; identify support needed and support network Outcome: Progressing Towards Goal 
Goal: *Insulin pump training Outcome: Progressing Towards Goal 
 Goal: *Sick day guidelines Outcome: Progressing Towards Goal 
Goal: *Patient Specific Goal (EDIT GOAL, INSERT TEXT) Outcome: Progressing Towards Goal 
  
Problem: Patient Education: Go to Patient Education Activity Goal: Patient/Family Education Outcome: Progressing Towards Goal 
  
Problem: Falls - Risk of 
Goal: *Absence of Falls Description Document Fredo Gutierrez Fall Risk and appropriate interventions in the flowsheet. Outcome: Progressing Towards Goal 
Note: Fall Risk Interventions: 
Mobility Interventions: Bed/chair exit alarm, Patient to call before getting OOB Medication Interventions: Bed/chair exit alarm, Patient to call before getting OOB Elimination Interventions: Elevated toilet seat, Patient to call for help with toileting needs History of Falls Interventions: Bed/chair exit alarm Problem: Patient Education: Go to Patient Education Activity Goal: Patient/Family Education Outcome: Progressing Towards Goal 
  
Problem: Pressure Injury - Risk of 
Goal: *Prevention of pressure injury Description Document Florian Scale and appropriate interventions in the flowsheet. Outcome: Progressing Towards Goal 
Note: Pressure Injury Interventions: 
Sensory Interventions: Assess changes in LOC Moisture Interventions: Absorbent underpads Activity Interventions: Increase time out of bed, Pressure redistribution bed/mattress(bed type) Mobility Interventions: Pressure redistribution bed/mattress (bed type) Nutrition Interventions: Document food/fluid/supplement intake Friction and Shear Interventions: Minimize layers Problem: Patient Education: Go to Patient Education Activity Goal: Patient/Family Education Outcome: Progressing Towards Goal 
  
Problem: Infection - Risk of, Surgical Site Infection Goal: *Absence of surgical site infection signs and symptoms Outcome: Progressing Towards Goal 
  
Problem: Patient Education: Go to Patient Education Activity Goal: Patient/Family Education Outcome: Progressing Towards Goal 
  
Problem: Pain Goal: *Control of Pain Outcome: Progressing Towards Goal 
Goal: *PALLIATIVE CARE:  Alleviation of Pain Outcome: Progressing Towards Goal 
  
Problem: Patient Education: Go to Patient Education Activity Goal: Patient/Family Education Outcome: Progressing Towards Goal 
  
Problem: Nutrition Deficit Goal: *Optimize nutritional status Outcome: Progressing Towards Goal 
  
Problem: Patient Education: Go to Patient Education Activity Goal: Patient/Family Education Outcome: Progressing Towards Goal 
  
Problem: Patient Education: Go to Patient Education Activity Goal: Patient/Family Education Outcome: Progressing Towards Goal

## 2019-12-21 NOTE — PROGRESS NOTES
Patient Name: Apryl Medrano Patient Age: 62 y.o. Past Medical History:  
Past Medical History:  
Diagnosis Date  Abnormally low high density lipoprotein (HDL) cholesterol with hypertriglyceridemia Lipid profile (11/6/2016) showed , , HDL 38, LDL 37  Acute blood loss as cause of postoperative anemia 12/12/2019  Anxiety  Bipolar affective disorder ([address] 12/5/2012  Cellulitis of right forearm 05/04/2017  Chronic back pain  Chronic obstructive pulmonary disease (COPD) ([address] SOB, on [place] O2 Recent admission with psychosis  Chronic respiratory failure with hypoxia ([address] On home oxygen inhalation at 3 LPM via [place]  
 Contusion of left elbow 5/4/2017  Depression  Diabetic neuropathy associated with type 2 diabetes mellitus ([address]  Diastolic dysfunction without heart failure Stable on diuretics  Falls frequently  Gastroesophageal reflux disease with hiatal hernia  Generalized osteoarthritis of multiple sites  Gout On Allopurinol  History of acute renal failure 5/31/2013  History of back injury   
 jumped out of second story window  History of fracture due to fall 12/11/2019  
 History of hepatitis C   
 treated  History of penicillin allergy  History of vitamin D deficiency 5/10/2017  Hypertensive heart disease without heart failure Better controlled  Hyperuricemia 5/26/2017  Hypothyroidism  Hypoxemia requiring supplemental oxygen 12/29/2014  Intravenous drug user 5/2/2017  Long term current use of aspirin  Memory difficulty  Menopause  Mixed connective tissue disease ([address] 5/10/2017  Obesity, Class I, BMI 30-34.9  Olecranon bursitis of right elbow 5/4/2017  Opioid dependence ([address] On Methadone  Recurrent genital herpes 5/31/2013  Right Achilles tendinitis 5/2/2017  Sarcoidosis  Sickle cell trait ([address]  Tobacco use disorder  Type 2 diabetes mellitus with diabetic neuropathy, with long-term current use of insulin ([facility] HbA1c (12/11/2019) = 7.1  Urge urinary incontinence 5/10/2017  Venous insufficiency  Wears glasses Medical Diagnosis:  Status post laminectomy with spinal fusion [Z98.1] Status post laminectomy with spinal fusion Recreation Therapy Initial Assessment Living arrangements:  _x_ alone   __spouse   __relative/family   __roommate    __other Transportation: __drivers license   __public transportation   x__friends/family   __none Cognitive status:  
Oriented to:   _x_person   __x_place   ___time   __x_situation Responsiveness:  __x_alert   ___inconsistent   ___unresponsive Mood: Pleasant, cooperative, very socia Decision-making abilities: _x_demonstrates initiative   __with prompting   __dependent Attention span: x__good (+15 min.)   __fair(5-15min.)   __poor(5min. or less) Follows directions: __good   ___fair   __poor prompts needed? __yes   __no Communication:  
_x_verbalizes needs   ___uses gestures   __unable to verbalize 
__verbal  difficult to understand __augmentative device Ability to understand conversation: _x_good   __fair   __poor Vision: _x_good   __fair   __poor   __blind   _x_wears glasses Hearing:  _x_good   __fair   __poor   __deaf   __hearing aid   __sign Hears best in :___R ear   ___L ear Premorbid mobility needs: _x_Independent   __assistance   __cane   __walker   __w/c POSSIBLE LEISURE BARRIERS:      
  Cognitive Skills  Social Skills/Approp. Communication Paralysis  Financial  General Weakness ROM Limitations  Mobility  Endurance Perceptual Problems  Grasp/Release  Fears/Phobias Hearing Deficits  Visual Acuity  Motivation Spasticity  Pain  Self Confidence Attitude Personal Goal for Admission: \"get home\" Past Leisure Activities: Dining out;Card games; Movies;Music;TV 
 Current Leisure Activities: TV(family and [employer]) Present Personal Support Systems: Friends; Family Ms Ernestine was seen to discuss premorbid leisure interests. She reports that she has recently tried to \"turn herself around\" and has become more active with [employer]. She feel well supported by her family and friends and appears motivated to participate in the program. She shared that she enjoys watching TV, particularly Westerns. She spends much of her time at home, but does enjoy occasional community outings with family and friends. Evi Mendez, CTRS 
12/20/2019

## 2019-12-22 NOTE — PROGRESS NOTES
SHIFT CHANGE NOTE FOR Anupam Eagle Bedside and Verbal shift change report given to Mike Grant RN (oncoming nurse) by Cristal Mullins RN (offgoing nurse). Report included the following information SBAR, Kardex, MAR and Recent Results. Situation: 
 Code Status: Full Code Reason for Admission: Spinal Thoracic laminectomy T 6-T 11 Hospital Day: 5 Problem List:  
Hospital Problems  Date Reviewed: 12/17/2019 Codes Class Noted POA Chronic respiratory failure with hypoxia (HCC) (Chronic) ICD-10-CM: J96.11 
ICD-9-CM: 518.83, 799.02  Unknown Yes Overview Signed 12/16/2019 10:11 PM by Dee Pang MD  
  On home oxygen inhalation at 3 LPM via [place] Opioid dependence (HCC) (Chronic) ICD-10-CM: R77.16 ICD-9-CM: 304.00  Unknown Yes Overview Signed 12/16/2019 10:54 PM by Dee Pang MD  
  On Methadone Acute blood loss as cause of postoperative anemia ICD-10-CM: D62 
ICD-9-CM: 285.1  12/12/2019 Yes Impaired mobility and ADLs ICD-10-CM: Z74.09 
ICD-9-CM: 799.89  12/11/2019 Yes Spinal cord compression (HCC) ICD-10-CM: G95.20 ICD-9-CM: 336.9  12/11/2019 Yes Compression fracture of body of thoracic vertebra (HCC) ICD-10-CM: S22.000A ICD-9-CM: 805.2  12/11/2019 Yes * (Principal) Status post laminectomy with spinal fusion ICD-10-CM: Z98.1 ICD-9-CM: V45.4  12/11/2019 Yes Overview Signed 12/16/2019 10:05 PM by Dee Pang MD  
  S/P T10, T9, T8 laminectomy; T7, T8, T9, T10, T11, T12 posterior thoracic fusion; segmental spinal instrumentation; K2M Davis type, T7-T8, T11-T12 (12/11/2019 - Dr. Rigoberto Morales) History of fracture due to fall ICD-10-CM: Z87.81 ICD-9-CM: V15.51  12/11/2019 Yes Hypoxemia requiring supplemental oxygen (Chronic) ICD-10-CM: R09.02, Z99.81 ICD-9-CM: 799.02  12/29/2014 Yes Chronic obstructive pulmonary disease (COPD) (HCC) (Chronic) ICD-10-CM: J44.9 ICD-9-CM: 758  Unknown Yes Overview Signed 4/23/2013 10:01 AM by Dian CALLES  
  SOB, on [place] O2 Recent admission with psychosis Hypertensive heart disease without heart failure (Chronic) ICD-10-CM: I11.9 ICD-9-CM: 402.90  Unknown Yes Overview Signed 4/23/2013 10:02 AM by Katey Alonso Better controlled Type 2 diabetes mellitus with diabetic neuropathy, with long-term current use of insulin (HCC) (Chronic) ICD-10-CM: E11.40, Z79.4 ICD-9-CM: 250.60, 357.2, V58.67  Unknown Yes Overview Signed 12/18/2019  2:13 PM by Blas Post MD  
  HbA1c (12/11/2019) = 7.1 Background: 
 Past Medical History:  
Past Medical History:  
Diagnosis Date  Abnormally low high density lipoprotein (HDL) cholesterol with hypertriglyceridemia Lipid profile (11/6/2016) showed , , HDL 38, LDL 37  Acute blood loss as cause of postoperative anemia 12/12/2019  Anxiety  Bipolar affective disorder ([address] 12/5/2012  Cellulitis of right forearm 05/04/2017  Chronic back pain  Chronic obstructive pulmonary disease (COPD) ([address] SOB, on [place] O2 Recent admission with psychosis  Chronic respiratory failure with hypoxia ([address] On home oxygen inhalation at 3 LPM via [place]  
 Contusion of left elbow 5/4/2017  Depression  Diabetic neuropathy associated with type 2 diabetes mellitus ([address]  Diastolic dysfunction without heart failure Stable on diuretics  Falls frequently  Gastroesophageal reflux disease with hiatal hernia  Generalized osteoarthritis of multiple sites  Gout On Allopurinol  History of acute renal failure 5/31/2013  History of back injury   
 jumped out of second story window  History of fracture due to fall 12/11/2019  
 History of hepatitis C   
 treated  History of penicillin allergy  History of vitamin D deficiency 5/10/2017  Hypertensive heart disease without heart failure Better controlled  Hyperuricemia 5/26/2017  Hypothyroidism  Hypoxemia requiring supplemental oxygen 12/29/2014  Intravenous drug user 5/2/2017  Long term current use of aspirin  Memory difficulty  Menopause  Mixed connective tissue disease ([facility] 5/10/2017  Obesity, Class I, BMI 30-34.9  Olecranon bursitis of right elbow 5/4/2017  Opioid dependence ([facility] On Methadone  Recurrent genital herpes 5/31/2013  Right Achilles tendinitis 5/2/2017  Sarcoidosis  Sickle cell trait ([facility]  Tobacco use disorder  Type 2 diabetes mellitus with diabetic neuropathy, with long-term current use of insulin ([facility] HbA1c (12/11/2019) = 7.1  Urge urinary incontinence 5/10/2017  Venous insufficiency  Wears glasses Patient taking anticoagulants no Patient has a defibrillator: no  
 
Assessment: 
? Changes in Assessment throughout shift: None ? Patient has central line: no Reasons if yes: Removed 12/16/19 Insertion date:n/a Last dressing date:n/a 
? Patient has Renae Cath: no Reasons if yes: n/a Insertion date:n/a 
 
? Last Vitals: 
  
Vitals:  
 12/20/19 2201 12/21/19 9963 12/21/19 1536 12/21/19 1614 BP: 124/72 126/84  102/64 Pulse: 72 71  70 Resp: 18 18  18 Temp: 98.9 °F (37.2 °C) 98.5 °F (36.9 °C)  97.4 °F (36.3 °C) SpO2: 100% 100%  99% Weight:   42.9 kg (94 lb 8 oz) LMP: 11/25/2012  
 
 
? PAIN Pain Assessment Pain Intensity 1: 8 (12/21/19 2000) Pain Intensity 1: 2 (12/29/14 1105) Pain Location 1: Back Pain Location 1: Abdomen Pain Intervention(s) 1: Medication (see MAR) Pain Intervention(s) 1: Medication (see MAR) Patient Stated Pain Goal: 0 Patient Stated Pain Goal: 0 
o Intervention effective: yes   
o Other actions taken for pain: no c/o 
 
? Skin Assessment Skin color Skin Color: Appropriate for ethnicity Condition/Temperature Skin Condition/Temp: Warm, Dry Integrity Skin Integrity: Intact Turgor Turgor: Non-tenting Weekly Pressure Ulcer Documentation  Pressure  Injury Documentation: No Pressure Injury Noted-Pressure Ulcer Prevention Initiated Wound Prevention & Protection Methods Orientation of wound Orientation of Wound Prevention: Posterior Location of Prevention Location of Wound Prevention: Buttocks, Sacrum/Coccyx Dressing Present Dressing Present : No 
Dressing Status Wound Offloading Wound Offloading (Prevention Methods): Bed, pressure redistribution/air, Repositioning ? INTAKE/OUPUT Date 12/20/19 1900 - 12/21/19 8776 12/21/19 0700 - 12/22/19 3851 Shift [id number] 24 Hour Total 8764-8947 9001-3064 24 Hour Total  
INTAKE Shift Total(mL/kg) OUTPUT Urine Urine Occurrence(s) 2 x 3 x 1 x 0 x 1 x Stool Stool Occurrence(s) 0 x 1 x  0 x 0 x Shift Total(mL/kg) NET Weight (kg)   42.9 42.9 42.9 Recommendations: 1. Patient needs and requests: met 2. Diet: Cardiac DM Reg [place] liq 3. Pending tests/procedures: none 4. Functional Level/Equipment: wheelchair 5. Estimated Discharge Date: TBD Posted on Whiteboard in Patients Room: yes HEALS Safety Check A safety check occurred in the patient's room between off going nurse and oncoming nurse listed above. The safety check included the below items Area Items H High Alert Medications ? Verify all high alert medication drips (heparin, PCA, etc.) E Equipment ? Suction is set up for ALL patients (with yanker) ? Red plugs utilized for all equipment (IV pumps, etc.) ? WOWs wiped down at end of shift. ? Room stocked with oxygen, suction, and other unit-specific supplies A Alarms ? Bed alarm is set for fall risk patients ? Ensure chair alarm is in place and activated if patient is up in a chair L Lines ? Check IV for any infiltration ? Renae bag is empty if patient has a Renae ? Tubing and IV bags are labeled Nicolas Johnson Safety ? Room is clean, patient is clean, and equipment is clean. ? Hallways are clear from equipment besides carts. ? Fall bracelet on for fall risk patients ? Ensure room is clear and free of clutter ? Suction is set up for ALL patients (with lashay) ? Hallways are clear from equipment besides carts. ? Isolation precautions followed, supplies available outside room, sign posted

## 2019-12-22 NOTE — PROGRESS NOTES
SHIFT CHANGE NOTE FOR Gael Randall Bedside and Verbal shift change report given to Gerald Lobo RN (oncoming nurse) by Melissa Quiroz RN (offgoing nurse). Report included the following information SBAR, Kardex, MAR and Recent Results. Situation: 
 Code Status: Full Code Reason for Admission: Spinal Thoracic laminectomy T 6-T 11 Hospital Day: 6 Problem List:  
Hospital Problems  Date Reviewed: 12/17/2019 Codes Class Noted POA Chronic respiratory failure with hypoxia (HCC) (Chronic) ICD-10-CM: J96.11 
ICD-9-CM: 518.83, 799.02  Unknown Yes Overview Signed 12/16/2019 10:11 PM by Joel Bill MD  
  On home oxygen inhalation at 3 LPM via [place] Opioid dependence (HCC) (Chronic) ICD-10-CM: B80.01 ICD-9-CM: 304.00  Unknown Yes Overview Signed 12/16/2019 10:54 PM by Joel Bill MD  
  On Methadone Acute blood loss as cause of postoperative anemia ICD-10-CM: D62 
ICD-9-CM: 285.1  12/12/2019 Yes Impaired mobility and ADLs ICD-10-CM: Z74.09 
ICD-9-CM: 799.89  12/11/2019 Yes Spinal cord compression (HCC) ICD-10-CM: G95.20 ICD-9-CM: 336.9  12/11/2019 Yes Compression fracture of body of thoracic vertebra (HCC) ICD-10-CM: S22.000A ICD-9-CM: 805.2  12/11/2019 Yes * (Principal) Status post laminectomy with spinal fusion ICD-10-CM: Z98.1 ICD-9-CM: V45.4  12/11/2019 Yes Overview Signed 12/16/2019 10:05 PM by Joel Bill MD  
  S/P T10, T9, T8 laminectomy; T7, T8, T9, T10, T11, T12 posterior thoracic fusion; segmental spinal instrumentation; K2M [place] type, T7-T8, T11-T12 (12/11/2019 - Dr. Ryan Valenzuela) History of fracture due to fall ICD-10-CM: Z87.81 ICD-9-CM: V15.51  12/11/2019 Yes Hypoxemia requiring supplemental oxygen (Chronic) ICD-10-CM: R09.02, Z99.81 ICD-9-CM: 799.02  12/29/2014 Yes Chronic obstructive pulmonary disease (COPD) (HCC) (Chronic) ICD-10-CM: J44.9 ICD-9-CM: 877  Unknown Yes Overview Signed 4/23/2013 10:01 AM by Erika CALLES  
  SOB, on [place] O2 Recent admission with psychosis Hypertensive heart disease without heart failure (Chronic) ICD-10-CM: I11.9 ICD-9-CM: 402.90  Unknown Yes Overview Signed 4/23/2013 10:02 AM by Barbi Mims Better controlled Type 2 diabetes mellitus with diabetic neuropathy, with long-term current use of insulin (HCC) (Chronic) ICD-10-CM: E11.40, Z79.4 ICD-9-CM: 250.60, 357.2, V58.67  Unknown Yes Overview Signed 12/18/2019  2:13 PM by Jolly Gee MD  
  HbA1c (12/11/2019) = 7.1 Background: 
 Past Medical History:  
Past Medical History:  
Diagnosis Date  Abnormally low high density lipoprotein (HDL) cholesterol with hypertriglyceridemia Lipid profile (11/6/2016) showed , , HDL 38, LDL 37  Acute blood loss as cause of postoperative anemia 12/12/2019  Anxiety  Bipolar affective disorder ([address] 12/5/2012  Cellulitis of right forearm 05/04/2017  Chronic back pain  Chronic obstructive pulmonary disease (COPD) ([address] SOB, on [place] O2 Recent admission with psychosis  Chronic respiratory failure with hypoxia ([address] On home oxygen inhalation at 3 LPM via [place]  
 Contusion of left elbow 5/4/2017  Depression  Diabetic neuropathy associated with type 2 diabetes mellitus ([address]  Diastolic dysfunction without heart failure Stable on diuretics  Falls frequently  Gastroesophageal reflux disease with hiatal hernia  Generalized osteoarthritis of multiple sites  Gout On Allopurinol  History of acute renal failure 5/31/2013  History of back injury   
 jumped out of second story window  History of fracture due to fall 12/11/2019  
 History of hepatitis C   
 treated  History of penicillin allergy  History of vitamin D deficiency 5/10/2017  Hypertensive heart disease without heart failure Better controlled  Hyperuricemia 5/26/2017  Hypothyroidism  Hypoxemia requiring supplemental oxygen 12/29/2014  Intravenous drug user 5/2/2017  Long term current use of aspirin  Memory difficulty  Menopause  Mixed connective tissue disease ([facility] 5/10/2017  Obesity, Class I, BMI 30-34.9  Olecranon bursitis of right elbow 5/4/2017  Opioid dependence ([facility] On Methadone  Recurrent genital herpes 5/31/2013  Right Achilles tendinitis 5/2/2017  Sarcoidosis  Sickle cell trait ([facility] [facility]  Tobacco use disorder  Type 2 diabetes mellitus with diabetic neuropathy, with long-term current use of insulin ([facility] HbA1c (12/11/2019) = 7.1  Urge urinary incontinence 5/10/2017  Venous insufficiency  Wears glasses Patient taking anticoagulants no Patient has a defibrillator: no  
 
Assessment: 
? Changes in Assessment throughout shift: None ? Patient has central line: no Reasons if yes: Removed 12/16/19 Insertion date:n/a Last dressing date:n/a 
? Patient has Renae Cath: no Reasons if yes: n/a Insertion date:n/a 
 
? Last Vitals: 
  
Vitals:  
 12/21/19 0811 12/21/19 1536 12/21/19 1614 12/21/19 2100 BP: 126/84  102/64 108/65 Pulse: 71  70 89 Resp: 18  18 16 Temp: 98.5 °F (36.9 °C)  97.4 °F (36.3 °C) 99.1 °F (37.3 °C) SpO2: 100%  99% 100% Weight:  42.9 kg (94 lb 8 oz) LMP: 11/25/2012  
 
 
? PAIN Pain Assessment Pain Intensity 1: 8 (12/22/19 0600) Pain Intensity 1: 2 (12/29/14 1105) Pain Location 1: Back Pain Location 1: Abdomen Pain Intervention(s) 1: Medication (see MAR) Pain Intervention(s) 1: Medication (see MAR) Patient Stated Pain Goal: 0 Patient Stated Pain Goal: 0 
o Intervention effective: yes   
o Other actions taken for pain: no c/o 
 
? Skin Assessment Skin color Skin Color: Appropriate for ethnicity Condition/Temperature Skin Condition/Temp: Dry, Warm Integrity Skin Integrity: Incision (comment) Turgor Turgor: Non-tenting Weekly Pressure Ulcer Documentation  Pressure  Injury Documentation: No Pressure Injury Noted-Pressure Ulcer Prevention Initiated Wound Prevention & Protection Methods Orientation of wound Orientation of Wound Prevention: Posterior Location of Prevention Location of Wound Prevention: Sacrum/Coccyx Dressing Present Dressing Present : No 
Dressing Status Wound Offloading Wound Offloading (Prevention Methods): Bed, pressure redistribution/air, Repositioning ? INTAKE/OUPUT Date 12/21/19 0700 - 12/22/19 2154 12/22/19 0700 - 12/23/19 9819 Shift 0700-1859 1900-0659 24 Hour Total 0700-1859 1900-0659 24 Hour Total  
INTAKE Shift Total(mL/kg) OUTPUT Urine(mL/kg/hr) Urine Occurrence(s) 1 x 2 x 3 x Stool Stool Occurrence(s)  0 x 0 x Shift Total(mL/kg) NET Weight (kg) 42.9 42.9 42.9 42.9 42.9 42.9 Recommendations: 1. Patient needs and requests: met 2. Diet: Cardiac DM Reg Saralyn Friendly liq 3. Pending tests/procedures: none 4. Functional Level/Equipment: wheelchair 5. Estimated Discharge Date: TBD Posted on Whiteboard in Patients Room: yes HEALS Safety Check A safety check occurred in the patient's room between off going nurse and oncoming nurse listed above. The safety check included the below items Area Items H High Alert Medications ? Verify all high alert medication drips (heparin, PCA, etc.) E Equipment ? Suction is set up for ALL patients (with yanker) ? Red plugs utilized for all equipment (IV pumps, etc.) ? WOWs wiped down at end of shift. ? Room stocked with oxygen, suction, and other unit-specific supplies A Alarms ? Bed alarm is set for fall risk patients ? Ensure chair alarm is in place and activated if patient is up in a chair L Lines ? Check IV for any infiltration ? Renae bag is empty if patient has a Renae ? Tubing and IV bags are labeled [place] [employer]otted Safety ? Room is clean, patient is clean, and equipment is clean. ? Hallways are clear from equipment besides carts. ? Fall bracelet on for fall risk patients ? Ensure room is clear and free of clutter ? Suction is set up for ALL patients (with lashay) ? Hallways are clear from equipment besides carts. ? Isolation precautions followed, supplies available outside room, sign posted

## 2019-12-22 NOTE — PROGRESS NOTES
Progress Note Patient: Dell Wick MRN: [id number]  CSN: [id number] YOB: 1955  Age: 62 y.o. Sex: female DOA: 12/16/2019 LOS:  LOS: 6 days Subjective: No acute complaints. Still having constipation, says only really passing \"smears\" when trying to have BM. No other concerns. Primary Rehab Diagnosis: Impaired Mobility and ADLs secondary to: 
1. S/P T10, T9, T8 laminectomy; T7, T8, T9, T10, T11, T12 posterior thoracic fusion; segmental spinal instrumentation; K2M [place] type, T7-T8, T11-T12 (12/11/2019 - Dr. Lashaun Horan) 2. History of acute compression fractures of body of thoracic vertebrae (T9 and T10) due to fall Review of systems General: No fevers or chills. Cardiovascular: No chest pain or pressure. No palpitations. Pulmonary: COPD, Sarcoidosis on chronic O2 Gastrointestinal: No abdominal pain, nausea, vomiting or diarrhea. +constipation improving Genitourinary: No urinary frequency, urgency, hesitancy or dysuria. Musculoskeletal: Thoracic spinal surgery as above Neurologic: BLE weakness Objective:  
 
Physical Exam: 
Visit Vitals /75 (BP 1 Location: Left arm, BP Patient Position: At rest) Pulse 71 Temp 98.7 °F (37.1 °C) Resp 17 Wt 42.9 kg (94 lb 8 oz) SpO2 96% Breastfeeding No  
BMI 15.73 kg/m² General:         Alert, cooperative, no acute distress   
HEENT: NC, Atraumatic. PERRLA, anicteric sclerae. Lungs: CTA Bilaterally. No Wheezing/Rhonchi/Rales. Heart:  Regular  rhythm,  No murmur, No Rubs, No Gallops Abdomen: Soft, Non distended, Non tender.  +Bowel sounds, no HSM Extremities: No edema Psych:   Not anxious or agitated. Neurologic:  CN 2-12 grossly intact, Alert and oriented X 3. No gross sensory deficit.  Motor strength is 4-/5 on BUE, 2+/5 on the RLE (except for 1/5 on the right ankle), 2-/5 on the LLE (except for 2+/5 on the left knee). Intake and Output: Current Shift:  No intake/output data recorded. Last three shifts:  No intake/output data recorded. Labs: Results:  
   
Chemistry No results for input(s): GLU, NA, K, CL, CO2, BUN, CREA, CA, AGAP, BUCR, TBIL, GPT, AP, TP, ALB, GLOB, AGRAT in the last 72 hours. CBC w/Diff No results for input(s): WBC, RBC, HGB, HCT, PLT, GRANS, LYMPH, EOS, HGBEXT, HCTEXT, PLTEXT, HGBEXT, HCTEXT, PLTEXT in the last 72 hours. Cardiac Enzymes No results for input(s): CPK, CKND1, SANTOS in the last 72 hours. No lab exists for component: Chrisjosemanuel Sallimonty Coagulation No results for input(s): PTP, INR, APTT, INREXT, INREXT in the last 72 hours. Lipid Panel Lab Results Component Value Date/Time Cholesterol, total 141 09/30/2019 12:00 AM  
 HDL Cholesterol 39 (L) 09/30/2019 12:00 AM  
 LDL, calculated 61 09/30/2019 12:00 AM  
 VLDL, calculated 41 (H) 09/30/2019 12:00 AM  
 Triglyceride 204 (H) 09/30/2019 12:00 AM  
 CHOL/HDL Ratio 3.5 11/06/2016 04:18 AM  
  
BNP No results for input(s): BNPP in the last 72 hours. Liver Enzymes No results for input(s): TP, ALB, TBIL, AP, SGOT, GPT in the last 72 hours. No lab exists for component: DBIL Thyroid Studies Lab Results Component Value Date/Time TSH 0.74 12/11/2019 05:37 PM  
    
 
Procedures/imaging: see electronic medical records for all procedures/Xrays and details which were not copied into this note but were reviewed prior to creation of Plan Medications:  
Current Facility-Administered Medications Medication Dose Route Frequency  lubiPROStone (AMITIZA) capsule 24 mcg  24 mcg Oral DAILY WITH BREAKFAST  insulin glargine (LANTUS) injection 22 Units  22 Units SubCUTAneous ACB  guaiFENesin ER (MUCINEX) tablet 600 mg  600 mg Oral Q12H  
 acetaminophen (TYLENOL) tablet 650 mg  650 mg Oral Q4H PRN  
 bisacodyl (DULCOLAX) tablet 10 mg  10 mg Oral Q48H PRN  
 ferrous sulfate tablet 325 mg  1 Tab Oral DAILY WITH BREAKFAST  ascorbic acid (vitamin C) (VITAMIN C) tablet 250 mg  250 mg Oral DAILY WITH BREAKFAST  insulin lispro (HUMALOG) injection   SubCUTAneous TIDAC  senna-docusate (PERICOLACE) 8.6-50 mg per tablet 2 Tab  2 Tab Oral PCD  methadone (DOLOPHINE) tablet 20 mg  20 mg Oral DAILY  oxyCODONE-acetaminophen (PERCOCET 10)  mg per tablet 1 Tab  1 Tab Oral Q4H PRN  predniSONE (DELTASONE) tablet 30 mg  30 mg Oral DAILY WITH BREAKFAST  famotidine (PEPCID) tablet 20 mg  20 mg Oral BID  rosuvastatin (CRESTOR) tablet 5 mg  5 mg Oral QHS  albuterol (PROVENTIL VENTOLIN) nebulizer solution 2.5 mg  2.5 mg Nebulization Q4H PRN  
 levothyroxine (SYNTHROID) tablet 75 mcg  75 mcg Oral 6am  
 cholecalciferol (VITAMIN D3) (400 Units /10 mcg) tablet 5 Tab  2,000 Units Oral DAILY  calcium citrate tablet 200 mg [elemental]  200 mg Oral BID  divalproex DR (DEPAKOTE) tablet 500 mg  500 mg Oral QHS  fluticasone-vilanterol (BREO ELLIPTA) 100mcg-25mcg/puff  1 Puff Inhalation DAILY  oxybutynin (DITROPAN) tablet 5 mg  5 mg Oral BID  allopurinol (ZYLOPRIM) tablet 100 mg  100 mg Oral DAILY  sertraline (ZOLOFT) tablet 50 mg  50 mg Oral DAILY  methocarbamol (ROBAXIN) tablet 500 mg  500 mg Oral Q8H  
 aspirin chewable tablet 81 mg  81 mg Oral DAILY WITH BREAKFAST  docusate sodium (COLACE) capsule 100 mg  100 mg Oral DAILY AFTER BREAKFAST Assessment/Plan Principal Problem: 
  Status post laminectomy with spinal fusion (12/11/2019) Overview: S/P T10, T9, T8 laminectomy; T7, T8, T9, T10, T11, T12 posterior thoracic  
    fusion; segmental spinal instrumentation; K2M [place] type, T7-T8, T11-T12  
    (12/11/2019 - Dr. Jamel Yi) Active Problems: 
  Chronic obstructive pulmonary disease (COPD) (HCC) () Overview: SOB, on [place] O2 Recent admission with psychosis Hypertensive heart disease without heart failure () Overview: Better controlled Type 2 diabetes mellitus with diabetic neuropathy, with long-term current use of insulin ([facility]) () Overview: HbA1c (12/11/2019) = 7.1 Hypoxemia requiring supplemental oxygen (12/29/2014) Impaired mobility and ADLs (12/11/2019) Spinal cord compression ([address] (12/11/2019) Compression fracture of body of thoracic vertebra (HCC) (12/11/2019) Acute blood loss as cause of postoperative anemia (12/12/2019) History of fracture due to fall (12/11/2019) Chronic respiratory failure with hypoxia (HCC) () Overview: On home oxygen inhalation at 3 LPM via [place] Opioid dependence ([facility]) () Overview: On Methadone S/P T10, T9, T8 laminectomy; T7, T8, T9, T10, T11, T12 posterior thoracic fusion; segmental spinal instrumentation; K2M [place] type, T7-T8, T11-T12 (12/11/2019 - Dr. Ros Pathak); History of acute compression fractures of body of thoracic vertebrae (T9 and T10) due to fall 
spinal precautions Pain control with Acetaminophen 650 mg PO q 4 hr PRN for pain level less than 5/10, Methocarbamol 500 mg PO q 8 hr, Percocet 10/325 1 tab PO q 4 hr PRN for pain level greater than 4/10 
  
Acute Postoperative Blood Loss Anemia Stable, continue ferrous sulfate 
  
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 congestive heart failure BP controlled off medication, monitor 
  
Chronic obstructive pulmonary disease; Chronic respiratory failure with hypoxemia requiring supplemental oxygen (3 LPM) Continue supplemental O2, breo ellipta 
  
Opioid dependence Continue Methadone 20 mg PO once daily Continue bowel regimen with colace, pericolace Add amitiza 
monitor constipation 
  
Type 2 diabetes mellitus with stage 3 chronic kidney disease FSG slightly improved, continue SSI, lantus 22 units daily Add 3 units humalog ac Pt notes she has been eating candy family has brought in for her, discussed need to reduce sugars in her diet to help blood sugar control and optimize healing.  
Monitor and adjust as appropriate 
  
 
Kyle Nichole MD 
12/22/2019 12:33pm

## 2019-12-22 NOTE — PROGRESS NOTES
SHIFT CHANGE NOTE FOR Levi Galvan Bedside and Verbal shift change report given to Mendoza Warren, RN (oncoming nurse) by Ravindra Schmidt RN (offgoing nurse). Report included the following information SBAR, Kardex, MAR and Recent Results. Situation: 
 Code Status: Full Code Reason for Admission: Spinal Thoracic laminectomy T 6-T 11 Hospital Day: 6 Problem List:  
Hospital Problems  Date Reviewed: 12/17/2019 Codes Class Noted POA Chronic respiratory failure with hypoxia (HCC) (Chronic) ICD-10-CM: J96.11 
ICD-9-CM: 518.83, 799.02  Unknown Yes Overview Signed 12/16/2019 10:11 PM by Laurel Willoughby MD  
  On home oxygen inhalation at 3 LPM via [place] Opioid dependence (HCC) (Chronic) ICD-10-CM: J63.93 ICD-9-CM: 304.00  Unknown Yes Overview Signed 12/16/2019 10:54 PM by Laurel Willoughby MD  
  On Methadone Acute blood loss as cause of postoperative anemia ICD-10-CM: D62 
ICD-9-CM: 285.1  12/12/2019 Yes Impaired mobility and ADLs ICD-10-CM: Z74.09 
ICD-9-CM: 799.89  12/11/2019 Yes Spinal cord compression (HCC) ICD-10-CM: G95.20 ICD-9-CM: 336.9  12/11/2019 Yes Compression fracture of body of thoracic vertebra (HCC) ICD-10-CM: S22.000A ICD-9-CM: 805.2  12/11/2019 Yes * (Principal) Status post laminectomy with spinal fusion ICD-10-CM: Z98.1 ICD-9-CM: V45.4  12/11/2019 Yes Overview Signed 12/16/2019 10:05 PM by Laurel Willoughby MD  
  S/P T10, T9, T8 laminectomy; T7, T8, T9, T10, T11, T12 posterior thoracic fusion; segmental spinal instrumentation; K2M [place] type, T7-T8, T11-T12 (12/11/2019 - Dr. Sarah Shaw) History of fracture due to fall ICD-10-CM: Z87.81 ICD-9-CM: V15.51  12/11/2019 Yes Hypoxemia requiring supplemental oxygen (Chronic) ICD-10-CM: R09.02, Z99.81 ICD-9-CM: 799.02  12/29/2014 Yes Chronic obstructive pulmonary disease (COPD) (HCC) (Chronic) ICD-10-CM: J44.9 ICD-9-CM: 209  Unknown Yes Overview Signed 4/23/2013 10:01 AM by Dian CALLES  
  SOB, on [place] O2 Recent admission with psychosis Hypertensive heart disease without heart failure (Chronic) ICD-10-CM: I11.9 ICD-9-CM: 402.90  Unknown Yes Overview Signed 4/23/2013 10:02 AM by Katey Alonso Better controlled Type 2 diabetes mellitus with diabetic neuropathy, with long-term current use of insulin (HCC) (Chronic) ICD-10-CM: E11.40, Z79.4 ICD-9-CM: 250.60, 357.2, V58.67  Unknown Yes Overview Signed 12/18/2019  2:13 PM by Blas Post MD  
  HbA1c (12/11/2019) = 7.1 Background: 
 Past Medical History:  
Past Medical History:  
Diagnosis Date  Abnormally low high density lipoprotein (HDL) cholesterol with hypertriglyceridemia Lipid profile (11/6/2016) showed , , HDL 38, LDL 37  Acute blood loss as cause of postoperative anemia 12/12/2019  Anxiety  Bipolar affective disorder ([address] 12/5/2012  Cellulitis of right forearm 05/04/2017  Chronic back pain  Chronic obstructive pulmonary disease (COPD) ([address] SOB, on [place] O2 Recent admission with psychosis  Chronic respiratory failure with hypoxia ([address] On home oxygen inhalation at 3 LPM via [place]  
 Contusion of left elbow 5/4/2017  Depression  Diabetic neuropathy associated with type 2 diabetes mellitus ([address]  Diastolic dysfunction without heart failure Stable on diuretics  Falls frequently  Gastroesophageal reflux disease with hiatal hernia  Generalized osteoarthritis of multiple sites  Gout On Allopurinol  History of acute renal failure 5/31/2013  History of back injury   
 jumped out of second story window  History of fracture due to fall 12/11/2019  
 History of hepatitis C   
 treated  History of penicillin allergy  History of vitamin D deficiency 5/10/2017  Hypertensive heart disease without heart failure Better controlled  Hyperuricemia 5/26/2017  Hypothyroidism  Hypoxemia requiring supplemental oxygen 12/29/2014  Intravenous drug user 5/2/2017  Long term current use of aspirin  Memory difficulty  Menopause  Mixed connective tissue disease ([facility] 5/10/2017  Obesity, Class I, BMI 30-34.9  Olecranon bursitis of right elbow 5/4/2017  Opioid dependence ([facility] On Methadone  Recurrent genital herpes 5/31/2013  Right Achilles tendinitis 5/2/2017  Sarcoidosis  Sickle cell trait ([facility]  Tobacco use disorder  Type 2 diabetes mellitus with diabetic neuropathy, with long-term current use of insulin ([facility] HbA1c (12/11/2019) = 7.1  Urge urinary incontinence 5/10/2017  Venous insufficiency  Wears glasses Patient taking anticoagulants no Patient has a defibrillator: no  
 
Assessment: 
? Changes in Assessment throughout shift: None ? Patient has central line: no Reasons if yes: Removed 12/16/19 Insertion date:n/a Last dressing date:n/a 
? Patient has Renae Cath: no Reasons if yes: n/a Insertion date:n/a 
 
? Last Vitals: 
  
Vitals:  
 12/21/19 1614 12/21/19 2100 12/22/19 9121 12/22/19 1626 BP: 102/64 108/65 120/75 114/68 Pulse: 70 89 71 70 Resp: 18 16 17 18 Temp: 97.4 °F (36.3 °C) 99.1 °F (37.3 °C) 98.7 °F (37.1 °C) 98.9 °F (37.2 °C) SpO2: 99% 100% 96% 100% Weight:      
LMP: 11/25/2012  
 
 
? PAIN Pain Assessment Pain Intensity 1: 6 (12/22/19 1346) Pain Intensity 1: 2 (12/29/14 1105) Pain Location 1: Back Pain Location 1: Abdomen Pain Intervention(s) 1: Medication (see MAR) Pain Intervention(s) 1: Medication (see MAR) Patient Stated Pain Goal: 0 Patient Stated Pain Goal: 0 
o Intervention effective: yes   
o Other actions taken for pain: no c/o 
 
? Skin Assessment Skin color Skin Color: Appropriate for ethnicity Condition/Temperature Skin Condition/Temp: Dry, Warm Integrity Skin Integrity: Incision (comment) Turgor Turgor: Non-tenting Weekly Pressure Ulcer Documentation  Pressure  Injury Documentation: No Pressure Injury Noted-Pressure Ulcer Prevention Initiated Wound Prevention & Protection Methods Orientation of wound Orientation of Wound Prevention: Posterior Location of Prevention Location of Wound Prevention: Sacrum/Coccyx, Buttocks Dressing Present Dressing Present : No 
Dressing Status Wound Offloading Wound Offloading (Prevention Methods): Bed, pressure redistribution/air ? INTAKE/OUPUT Date 12/21/19 0700 - 12/22/19 4795 12/22/19 0700 - 12/23/19 8235 Shift 0700-1859 1900-0659 24 Hour Total 0700-1859 1900-0659 24 Hour Total  
INTAKE Shift Total(mL/kg) OUTPUT Urine(mL/kg/hr) Urine Occurrence(s) 1 x 2 x 3 x 0 x  0 x Stool Stool Occurrence(s)  0 x 0 x 1 x  1 x Shift Total(mL/kg) NET Weight (kg) 42.9 42.9 42.9 42.9 42.9 42.9 Recommendations: 1. Patient needs and requests: met 2. Diet: Cardiac DM Reg Telma torres 3. Pending tests/procedures: none 4. Functional Level/Equipment: wheelchair 5. Estimated Discharge Date: TBD Posted on Whiteboard in Patients Room: yes HEALS Safety Check A safety check occurred in the patient's room between off going nurse and oncoming nurse listed above. The safety check included the below items Area Items H High Alert Medications ? Verify all high alert medication drips (heparin, PCA, etc.) E Equipment ? Suction is set up for ALL patients (with yanker) ? Red plugs utilized for all equipment (IV pumps, etc.) ? WOWs wiped down at end of shift. ? Room stocked with oxygen, suction, and other unit-specific supplies A Alarms ? Bed alarm is set for fall risk patients ? Ensure chair alarm is in place and activated if patient is up in a chair L Lines ? Check IV for any infiltration ? Renae bag is empty if patient has a Renae ? Tubing and IV bags are labeled [employer] Safety ? Room is clean, patient is clean, and equipment is clean. ? Hallways are clear from equipment besides carts. ? Fall bracelet on for fall risk patients ? Ensure room is clear and free of clutter ? Suction is set up for ALL patients (with lashay) ? Hallways are clear from equipment besides carts. ? Isolation precautions followed, supplies available outside room, sign posted

## 2019-12-22 NOTE — PROGRESS NOTES
Problem: Self Care Deficits Care Plan (Adult) Goal: *Therapy Goal (Edit Goal, Insert Text) Description Occupational Therapy Goals Long Term Goals Initiated 19 and to be accomplished within 4 week(s)  to facilitate increased self care independence and strength for return to PLOF and decreased care giver burden: 2. Pt will perform grooming with Mod I. 
3. Pt will perform UB bathing with SBA. 4. Pt will perform LB bathing with Tyree. 5. Pt will perform tub/shower transfer <> TTB with Min A.  
6. Pt will perform UB dressing with Set-up. 7. Pt will perform LB dressing with Min A. 
8. Pt will perform toileting task with Min A. 
9. Pt will perform toilet transfer with Min A/CGA. Short Term Goals Initiated 19 and to be accomplished within 7 day(s) (19) to facilitate increased self care independence and strength for return to PLOF and decreased care giver burden: 1. Pt will perform self-feeding with Mod I.  
2. Pt will perform grooming with set-up. 3. Pt will perform UB bathing with Mod A. 
4. Pt will perform LB bathing with Mod A in LRE. 5. Pt will perform tub/shower transfer <> TTB with MaxA x 1. 
6. Pt will perform UB dressing with SBA. 7. Pt will perform LB dressing with Mod A using AE as needed. 8. Pt will perform toileting task with Max A x 1. 
9. Pt will perform toilet transfer with MaxA x 1. Outcome Measures: 
(19) Dynamometer  strength: Right= 27.33# (norm=55. 1#) Left= 34.67# (norm=45.7#) Outcome: Progressing Towards Goal 
Goal: *Therapy Goal (Edit Goal, Insert Text) Outcome: Progressing Towards Goal 
Goal: Interventions Outcome: Progressing Towards Goal 
OCCUPATIONAL THERAPY TREATMENT Patient: Lina Sanchez 62 y.o. Patient identified with name and : yes Date: 2019 First Tx Session Time In: 810 Time Out[de-identified] 200 Diagnosis: Status post laminectomy with spinal fusion [Z98.1] Precautions: Chart, occupational therapy assessment, plan of care, and goals were reviewed. Pain: 
Pt reports *8/10 pain or discomfort prior to treatment. Spasm 9/10 Pt reports 8/10 pain or discomfort post treatment. Spasm 9/10 Intervention Provided: Pt received pain med's prior to treatment SUBJECTIVE:  
Patient stated 'I am stiff form laying in bed all day, I have being up since 4 am OBJECTIVE DATA SUMMARY:  
Pt seen with pants at thigh in long sitting in bed. Followed with LB dressing, transfer to wheel chair mod assist/ verbal cues for proper hand placement and body mechanics. THERAPEUTIC EXERCISE Daily Assessment With core strengthening in wheel chair pt also performs UE strengthening with one pound down performing 3x10 reps with chest press with slight extension. FEEDING/EATING Daily Assessment Feeding/Eating Feeding/Eating Assistance: 5 (Supervision/setup) Comments: Pt open creamer and sugar  for coffee and oatmeal.   
 
GROOMING Daily Assessment Grooming Grooming Assistance : 6 (Modified independent) Comments: Pt apply polygrip to dentures than inserting denture at sink. UPPER BODY DRESSING Daily Assessment Upper Body Dressing Dressing Assistance : 4 (Minimal assistance) Comments: Min assist donning jacket around right shoulder, pt inserting UE's into sleeve. LOWER BODY DRESSING Daily Assessment Lower Body Dressing Dressing Assistance : 3 (Moderate assistance) Leg Crossed Method Used: No 
Position Performed: Long sitting on bed Comments: Pt attempted bridging while donning pants over buttocks than log rolled with cuing and min assist donning pants over buttocks. MOBILITY/TRANSFERS Daily Assessment Pt transfer to wheel chair on sliding board, mod assist 2/2 pt inability to lift buttocks 2/2  UE weakness. ASSESSMENT:Pt demonstrates FM tasks without sign of difficulty.  Pt demonstrates decrease UE strength and inability to follow lumbar precaution with transfer. Pt fatigue easily demonstrating decrease activity tolerance. Progression toward goals: 
[]          Improving appropriately and progressing toward goals [x]          Improving slowly and progressing toward goals 
[]          Not making progress toward goals and plan of care will be adjusted PLAN: 
Patient continues to benefit from skilled intervention to address the above impairments. Continue treatment per established plan of care. Discharge Recommendations:  Home Health3:1 commode Activity Tolerance: 
Fair Estimated LOS:1/4/2020 Please refer to the flow sheet for vital signs taken during this treatment. After treatment:  
[x]  Patient left in no apparent distress sitting up in chair  
[]  Patient left in no apparent distress in bed 
[]  Call bell left within reach 
[]  Nursing notified 
[]  Caregiver present [x]  Hand off pt to dinning room staff COMMUNICATION/EDUCATION:  
[] Home safety education was provided and the patient/caregiver indicated understanding. [] Patient/family have participated as able in goal setting and plan of care. [x] Patient/family agree to work toward stated goals and plan of care. [] Patient understands intent and goals of therapy, but is neutral about his/her participation. [] Patient is unable to participate in goal setting and plan of care. Lord Sabina FORREST 
12/22/2019

## 2019-12-23 NOTE — PROGRESS NOTES
Problem: Self Care Deficits Care Plan (Adult) Goal: *Therapy Goal (Edit Goal, Insert Text) Description Occupational Therapy Goals Long Term Goals Initiated 19 and to be accomplished within 4 week(s)  to facilitate increased self care independence and strength for return to PLOF and decreased care giver burden: 2. Pt will perform grooming with Mod I. 
3. Pt will perform UB bathing with SBA. 4. Pt will perform LB bathing with Tyree. 5. Pt will perform tub/shower transfer <> TTB with Min A.  
6. Pt will perform UB dressing with Set-up. 7. Pt will perform LB dressing with Min A. 
8. Pt will perform toileting task with Min A. 
9. Pt will perform toilet transfer with Min A/CGA. Short Term Goals Initiated 19 and to be accomplished within 7 day(s) (19) to facilitate increased self care independence and strength for return to PLOF and decreased care giver burden: 1. Pt will perform self-feeding with Mod I.  
2. Pt will perform grooming with set-up. 3. Pt will perform UB bathing with Mod A. 
4. Pt will perform LB bathing with Mod A in LRE. 5. Pt will perform tub/shower transfer <> TTB with MaxA x 1. 
6. Pt will perform UB dressing with SBA. 7. Pt will perform LB dressing with Mod A using AE as needed. 8. Pt will perform toileting task with Max A x 1. 
9. Pt will perform toilet transfer with MaxA x 1. Outcome Measures: 
(19) Dynamometer  strength: Right= 27.33# (norm=55. 1#) Left= 34.67# (norm=45.7#) Outcome: Progressing Towards Goal 
Goal: *Therapy Goal (Edit Goal, Insert Text) Outcome: Progressing Towards Goal 
Goal: Interventions Outcome: Progressing Towards Goal 
OCCUPATIONAL THERAPY TREATMENT Patient: Beatriz Hdz 62 y.o. Patient identified with name and :yes Date: 2019 First Tx Session Time In: 803 Time Out[de-identified] 271 Second Tx Session Time In: *1030 Time Out[de-identified] 1100 Diagnosis: Status post laminectomy with spinal fusion [Z98.1] Precautions: Fall Chart, occupational therapy assessment, plan of care, and goals were reviewed. Pain: 
Pt reports 8/10 pain or discomfort prior to treatment. Pt reports   8/10 pain or discomfort post treatment. Intervention Provided: None SUBJECTIVE:  
Patient stated The pressure and spasm in my back is a 5/10.  OBJECTIVE DATA SUMMARY:  
Pt seen in long sitting in bed for LB ADL's and UB at sink from wheel chair level. Seconed session: IADL's and UE strengthening. THERAPEUTIC EXERCISE Daily Assessment Pt supporting UE in chair while performing chest press with one pound dowel,4 X10 reps with rest 2/2 fatigue. IADL Daily Assessment Pt assorted clothing for laundry from wheel chair level. Pt flexing from the waist  reaching forward placing laundry into washing machine. Pt needed assist with detergents and setting 2/2 pt inability to stand. FEEDING/EATING Daily Assessment Feeding/Eating Feeding/Eating Assistance: 5 (Supervision/setup) Comments: Pt open containers and sugar packers than self feed independently GROOMING Daily Assessment Grooming Grooming Assistance : 6 (Modified independent) Comments: Pt performed grooming from seated position UPPER BODY BATHING Daily Assessment Upper Body Bathing Bathing Assistance, Upper: 5 (Supervision) Position Performed: Seated in chair Comments: Pt set up at sink, performing UB bathing with supervision. LOWER BODY BATHING Daily Assessment Lower Body Bathing Bathing Assistance, Lower : 3 (Moderate assistance ) Adaptive Equipment: Long handled sponge Position Performed: Long sitting on bed Comments: Pt set up with LH sponge washing feet,min assist with rinsing. Pt than washed tyrell area and rolled side to side to have buttocks washed dependent. UPPER BODY DRESSING Daily Assessment Upper Body Dressing Dressing Assistance : 5 (Supervision) Comments: Pt donned under shirt and blouse seated, cues on holding and pulling under shirt and blouse from the tail LOWER BODY DRESSING Daily Assessment Lower Body Dressing Dressing Assistance : 3 (Moderate assistance) Leg Crossed Method Used: No 
Position Performed: Long sitting on bed;Seated edge of bed Adaptive Equipment Used: Reacher;Sock aid Don/Doff Anti-Embolic Stockings: 2 (Maximal assistance) Comments: Max assist donning Gabriel stocking. Educate pt on using reacher to assist with donning pants. Pt needed assist threading LE's into pants legs 2/2 LE's weakness. than rolled to have pants donned over buttocks dependently. Pt needed assist donning sock onto sock aid 2/2 tremor in left hand than donning sock onto feet with supervision. MOBILITY/TRANSFERS Daily Assessment Pt transfer to wheel chair on sliding board,mod assist 2/2 fatigue. ASSESSMENT: Pt con't to make small progress with self care,increasing unsupportive sitting balance. Pt would benefit from con't education on using AD for LB ADL's to maximize independency. Progression toward goals: 
[]          Improving appropriately and progressing toward goals [x]          Improving slowly and progressing toward goals 
[]          Not making progress toward goals and plan of care will be adjusted PLAN: 
Patient continues to benefit from skilled intervention to address the above impairments. Continue treatment per established plan of care. Discharge Recommendations: Home Health Further Equipment Recommendations for Discharge: 3:1 commode Activity Tolerance: 
Fair Estimated LOS:1/14/2020 Please refer to the flow sheet for vital signs taken during this treatment. After treatment:  
[x]  Patient left in no apparent distress sitting up in chair  
[]  Patient left in no apparent distress in bed 
[]  Call bell left within reach 
[]  Nursing notified 
[]  Caregiver present [x]  Hand off pt to PT  
 
 COMMUNICATION/EDUCATION:  
[] Home safety education was provided and the patient/caregiver indicated understanding. [] Patient/family have participated as able in goal setting and plan of care. [x] Patient/family agree to work toward stated goals and plan of care. [] Patient understands intent and goals of therapy, but is neutral about his/her participation. [] Patient is unable to participate in goal setting and plan of care. Jorge Alberto FORREST 
12/23/2019

## 2019-12-23 NOTE — PROGRESS NOTES
[address] 
[address] [place] [address], [address] INPATIENT REHABILITATION 
DAILY PROGRESS NOTE Date: 12/23/2019 Name: Alicia Laughlin Age / Sex: 62 y.o. / female CSN: [id number] MRN: [id number] Admit Date: 12/16/2019 Length of Stay: 7 days Primary Rehab Diagnosis: Impaired Mobility and ADLs secondary to: 
1. S/P T10, T9, T8 laminectomy; T7, T8, T9, T10, T11, T12 posterior thoracic fusion; segmental spinal instrumentation; K2M [place] type, T7-T8, T11-T12 (12/11/2019 - Dr. Susan Samson) 2. History of acute compression fractures of body of thoracic vertebrae (T9 and T10) due to fall Subjective:  
 
Patient seen and examined. Blood pressure controlled. Blood glucose controlled. Patient's Complaint:  
No significant medical complaints Pain Control: stable, mild-to-moderate joint symptoms intermittently, reasonably well controlled by current meds Objective:  
 
Vital Signs: 
Patient Vitals for the past 24 hrs: 
 BP Temp Pulse Resp SpO2  
12/23/19 1524 111/66 99.2 °F (37.3 °C) 68 18 98 % 12/23/19 0741 116/67 97.9 °F (36.6 °C) 71 20 98 % 12/22/19 2140 123/67 97.9 °F (36.6 °C) 67 18 100 % 12/22/19 1626 114/68 98.9 °F (37.2 °C) 70 18 100 % Physical Examination: 
GENERAL SURVEY: Patient is awake, alert, oriented x 3, sitting comfortably on the chair, not in acute respiratory distress. HEENT: pale palpebral conjunctivae, anicteric sclerae, no nasoaural discharge, moist oral mucosa NECK: supple, no jugular venous distention, no palpable lymph nodes CHEST/LUNGS: symmetrical chest expansion, good air entry, clear breath sounds HEART: adynamic precordium, good S1 S2, no S3, regular rhythm, no murmurs ABDOMEN: obese, bowel sounds appreciated, soft, non-tender EXTREMITIES: pale nailbeds, no edema, full and equal pulses, no calf tenderness NEUROLOGICAL EXAM: The patient is awake, alert and oriented x3, able to answer questions fairly appropriately, able to follow 1 and 2 step commands. Able to tell time from the wall clock. Cranial nerves II-XII are grossly intact. No gross sensory deficit. Motor strength is 4-/5 on BUE, 2+/5 on the RLE (except for 1/5 on the right ankle), 2-/5 on the LLE (except for 2+/5 on the left knee).   
Incision(s): healing well, clean, dry, and intact and serosanguineous Current Medications: 
Current Facility-Administered Medications Medication Dose Route Frequency  lubiPROStone (AMITIZA) capsule 24 mcg  24 mcg Oral DAILY WITH BREAKFAST  insulin lispro (HUMALOG) injection 3 Units  3 Units SubCUTAneous TIDAC  insulin glargine (LANTUS) injection 22 Units  22 Units SubCUTAneous ACB  guaiFENesin ER (MUCINEX) tablet 600 mg  600 mg Oral Q12H  
 acetaminophen (TYLENOL) tablet 650 mg  650 mg Oral Q4H PRN  
 bisacodyl (DULCOLAX) tablet 10 mg  10 mg Oral Q48H PRN  
 ferrous sulfate tablet 325 mg  1 Tab Oral DAILY WITH BREAKFAST  ascorbic acid (vitamin C) (VITAMIN C) tablet 250 mg  250 mg Oral DAILY WITH BREAKFAST  insulin lispro (HUMALOG) injection   SubCUTAneous TIDAC  senna-docusate (PERICOLACE) 8.6-50 mg per tablet 2 Tab  2 Tab Oral PCD  methadone (DOLOPHINE) tablet 20 mg  20 mg Oral DAILY  oxyCODONE-acetaminophen (PERCOCET 10)  mg per tablet 1 Tab  1 Tab Oral Q4H PRN  predniSONE (DELTASONE) tablet 30 mg  30 mg Oral DAILY WITH BREAKFAST  famotidine (PEPCID) tablet 20 mg  20 mg Oral BID  rosuvastatin (CRESTOR) tablet 5 mg  5 mg Oral QHS  albuterol (PROVENTIL VENTOLIN) nebulizer solution 2.5 mg  2.5 mg Nebulization Q4H PRN  
 levothyroxine (SYNTHROID) tablet 75 mcg  75 mcg Oral 6am  
 cholecalciferol (VITAMIN D3) (400 Units /10 mcg) tablet 5 Tab  2,000 Units Oral DAILY  calcium citrate tablet 200 mg [elemental]  200 mg Oral BID  divalproex DR (DEPAKOTE) tablet 500 mg  500 mg Oral QHS  fluticasone-vilanterol (BREO ELLIPTA) 100mcg-25mcg/puff  1 Puff Inhalation DAILY  oxybutynin (DITROPAN) tablet 5 mg  5 mg Oral BID  allopurinol (ZYLOPRIM) tablet 100 mg  100 mg Oral DAILY  sertraline (ZOLOFT) tablet 50 mg  50 mg Oral DAILY  methocarbamol (ROBAXIN) tablet 500 mg  500 mg Oral Q8H  
 aspirin chewable tablet 81 mg  81 mg Oral DAILY WITH BREAKFAST  docusate sodium (COLACE) capsule 100 mg  100 mg Oral DAILY AFTER BREAKFAST Allergies: Allergies Allergen Reactions  Moxifloxacin Rash  Pcn [Penicillins] Swelling  Sulfa (Sulfonamide Antibiotics) Swelling Lab/Data Review: 
Recent Results (from the past 24 hour(s)) GLUCOSE, POC Collection Time: 12/22/19  5:29 PM  
Result Value Ref Range Glucose (POC) 256 (H) 70 - 110 mg/dL GLUCOSE, POC Collection Time: 12/22/19  9:41 PM  
Result Value Ref Range Glucose (POC) 170 (H) 70 - 110 mg/dL METABOLIC PANEL, BASIC Collection Time: 12/23/19  5:39 AM  
Result Value Ref Range Sodium 140 136 - 145 mmol/L Potassium 4.7 3.5 - 5.5 mmol/L Chloride 101 100 - 111 mmol/L  
 CO2 33 (H) 21 - 32 mmol/L Anion gap 6 3.0 - 18 mmol/L Glucose 121 (H) 74 - 99 mg/dL BUN 17 7.0 - 18 MG/DL Creatinine 0.79 0.6 - 1.3 MG/DL  
 BUN/Creatinine ratio 22 (H) 12 - 20 GFR est AA >60 >60 ml/min/1.73m2 GFR est non-AA >60 >60 ml/min/1.73m2 Calcium 8.5 8.5 - 10.1 MG/DL  
HGB & HCT Collection Time: 12/23/19  5:39 AM  
Result Value Ref Range HGB 8.5 (L) 12.0 - 16.0 g/dL HCT 26.7 (L) 35.0 - 45.0 % GLUCOSE, POC Collection Time: 12/23/19  7:39 AM  
Result Value Ref Range Glucose (POC) 133 (H) 70 - 110 mg/dL GLUCOSE, POC Collection Time: 12/23/19 12:33 PM  
Result Value Ref Range Glucose (POC) 131 (H) 70 - 110 mg/dL Estimated Glomerular Filtration Rate: On admission, estimated GFR based on a Creatinine of 0.68 mg/dl: 
            Using CKD-EPI = 107.9 mL/min/1.73m2 Using MDRD = 112.4 mL/min/1.73m2 Most recent estimated GFR, based on a Creatinine of 0.79 mg/dl on 12/23/2019: 
 Using CKD-EPI = 92.3 mL/min/1.73m2 Using MDRD = 94.5 mL/min/1.73m2 Assessment:  
 
Primary Rehabilitation Diagnosis 1. Impaired Mobility and ADLs 2. S/P T10, T9, T8 laminectomy; T7, T8, T9, T10, T11, T12 posterior thoracic fusion; segmental spinal instrumentation; K2M [place] type, T7-T8, T11-T12 (12/11/2019 - Dr. Radha Coburn) 3. History of acute compression fractures of body of thoracic vertebrae (T9 and T10) due to fall 
  
Comorbidities  Diabetic neuropathy associated with type 2 diabetes mellitus (HCC) E11.40  Venous insufficiency I87.2  Obesity, Class I, BMI 30-34.9 E66.9  Chronic back pain M54.9, G89.29  
 Generalized osteoarthritis of multiple sites M15.9  Bipolar affective disorder  F31.9  Abnormally low high density lipoprotein (HDL) cholesterol with hypertriglyceridemia E78.6, E78.1  Diastolic dysfunction without heart failure I51.89  Chronic obstructive pulmonary disease (COPD)  J44.9  Hypertensive heart disease without heart failure I11.9  Depression F32.9  History of back injury Z87.828  Type 2 diabetes mellitus with diabetic neuropathy, with long-term current use of insulin  E11.40, Z79.4  Anxiety F41.9  Wears glasses Z97.3  History of hepatitis C Z86.19  
 Sickle cell trait D57.3  History of acute renal failure Z87.448  Hypothyroidism E03.9  Recurrent genital herpes A60.00  Sarcoidosis D86.9  Abscess of right arm L02.413  Hypoxemia requiring supplemental oxygen R09.02, Z99.81  
 Abnormal nuclear stress test R94.39  
 Cellulitis and abscess of hand L03.119, L02.519  
 Cellulitis and abscess of foot L03.119, L02.619  
 Cellulitis of arm, right L03. 113  
 Olecranon bursitis of right elbow M70.21  
 Contusion of left elbow S50. 02XA  Intravenous drug user F19.90  Right Achilles tendinitis M76.61  
  History of penicillin allergy Z88.0  Falls frequently R29.6  Gastroesophageal reflux disease with hiatal hernia K21.9, K44.9  Memory difficulty R41.3  Mixed connective tissue disease  M35.1  Hyperuricemia E79.0  History of vitamin D deficiency Z86.39  
 Urge urinary incontinence N39.41  
 Acute blood loss as cause of postoperative anemia D62  
 History of fracture due to fall Z87.81  Chronic respiratory failure with hypoxia  J96.11  
 Cellulitis of right forearm L03. 113  
 Gout M10.9  Menopause Z78.0  Long term current use of aspirin Z79.82  
 Opioid dependence  F11.20  Tobacco use disorder F17.200 Plan: 1. Justification for continued stay: Good progression towards established rehabilitation goals. 2. Medical Issues being followed closely: 
  [x]  Fall and safety precautions  
  []  Wound Care  
  [x]  Bowel and Bladder Function  
  [x]  Fluid Electrolyte and Nutrition Balance  
  []  Pain Control 3. Issues that 24 hour rehabilitation nursing is following: 
  [x]  Fall and safety precautions  
  []  Wound Care  
  [x]  Bowel and Bladder Function  
  [x]  Fluid Electrolyte and Nutrition Balance  
  []  Pain Control   
  [x]  Assistance with and education on in-room safety with transfers to and from the bed, wheelchair, toilet and shower. 4. Acute rehabilitation plan of care: 
  [x]  Continue current care and rehab. [x]  Physical Therapy [x]  Occupational Therapy  
        []  Speech Therapy  
  []  Hold Rehab until further notice 5. Medications: 
  [x]  MAR Reviewed [x]  Continue Present Medications 6. DVT Prophylaxis:   
  []  Lovenox  
  []  Unfractionated Heparin  
  []  Coumadin  
  []  NOAC [x]  ROBERT Stockings  
  []  Sequential Compression Device []  None 7.  Orders:  
> S/P T10, T9, T8 laminectomy; T7, T8, T9, T10, T11, T12 posterior thoracic fusion; segmental spinal instrumentation; K2M [place] type, T7-T8, T11-T12 (12/11/2019 - Dr. Demarco Key); History of acute compression fractures of body of thoracic vertebrae (T9 and T10) due to fall 
 > Thoracic spinal precautions 
 > Continue: 
  > Calcium citrate 200 mg PO BID 
  > Cholecalciferol 2,000 units PO once daily 
 
> Acute Postoperative Blood Loss Anemia 
 > Anemia work-up (12/13/2019) showed serum iron 11, TIBC 215, iron % saturation 5, ferritin 146 
 > Hgb/Hct (12/17/2019, on admission) = 7.4/23.1 
 > Reticulocyte count (12/17/2019) = 2.5 
 > Hgb/Hct (12/23/2019) = 8.5/26.7  
 > Continue: 
  > FeSO4 325 mg PO once daily with breakfast  
  > Ascorbic Acid 250 mg PO once daily with breakfast (to enhance the absorption of the FeSO4)  
 
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 
 > Prior to admission, the patient stated she was on Metolazone 2.5 mg PO q Mon-Wed-Fri 
 > Metolazone was not given during the patient's hospital stay at [facility]  
 > Upon admission to the ARU, held Metolazone  
 
> Chronic obstructive pulmonary disease; Chronic respiratory failure with hypoxemia requiring supplemental oxygen (3 LPM) > Continue: 
  > Fluticasone-Vilanterol 100-25 1 puff inhaled once daily 
  > O2 inhalation at 3 LPM via nasal cannula continuous 
 
> Opioid dependence 
 > Continue Methadone 20 mg PO once daily 
 
> Type 2 diabetes mellitus with stage 3 chronic kidney disease 
 > HbA1c (12/11/2019) = 7.1 
 > On 12/22/2019: 
  > Increased Insulin glargine from 20 units to 22 units PO once daily before breakfast 
  > Started Insulin lispro 3 units SC TID AC  
 > Continue: 
  > Insulin glargine 2 units PO once daily before breakfast 
  > Insulin lispro 3 units SC TID AC  
  > Insulin lispro sliding scale SC TID AC only 
 
> Cough 
 > On 12/18/2019, started Guaifenesin  mg PO q 12 hr 
 > Continue Guaifenesin  mg PO q 12 hr 
 
> Constipation > On 12/17/2019, started: 
  > Docusate sodium 100 mg PO once daily after breakfast 
  > PeriColace 2 tabs PO once daily after dinner 
 > On 12/22/2019, started Lubiprostone 24 mcg PO once daily with breakfast 
 > Continue: 
  > Docusate sodium 100 mg PO once daily after breakfast 
  > PeriColace 2 tabs PO once daily after dinner 
  > Lubiprostone 24 mcg PO once daily with breakfast 
  
> Analgesia 
 > Continue: 
  > Acetaminophen 650 mg PO q 4 hr PRN for pain level less than 5/10 
  > Methocarbamol 500 mg PO q 8 hr 
  > Percocet 10/325 1 tab PO q 4 hr PRN for pain level greater than 4/10  
 
> Diet: 
 > Specifications: Cardiac, diabetic consistent carb 1800 kcal, no concentrated sweets, low purine 
 > Solids (consistency): Regular  
 > Liquids (consistency): Thin 8. Patient's progress in rehabilitation and medical issues discussed with the patient. All questions answered to the best of my ability. Care plan discussed with patient and nurse. Signed:    Ananya Swain MD 
  December 23, 2019

## 2019-12-23 NOTE — PROGRESS NOTES
Problem: Mobility Impaired (Adult and Pediatric) Goal: *Therapy Goal (Edit Goal, Insert Text) Description Physical Therapy Goals: STG Initiated 12/17/2019 and to be accomplished within 7 day(s) on 12/24/2019: 
1. Patient will move from supine to sit and sit to supine , scoot up and down and roll side to side in bed with moderate assistance . 2.  Patient will transfer from bed to chair and chair to bed with maximal assistance with one person assist using the least restrictive device. 3.  Patient will perform sit to stand with maximal assistance with one person assist using appropriate AD. 4.  Patient will perform static sitting balance without UE support for at least 2 minutes, demonstrating appropriate upright and midline posture at supervision level for ease of preparation for transfers. 5.  Patient will perform wheelchair mobility moderate assist for 150 ft distance over even surfaces. Physical Therapy Goals: LTG Initiated 12/17/2019 and to be accomplished within 28 day(s) on 01/14/2019:  
1. Patient will move from supine to sit and sit to supine , scoot up and down and roll side to side in bed with supervision/set-up. 2.  Patient will transfer from bed to chair and chair to bed with contact guard assist/stand-by assist using the least restrictive device. 3.  Patient will perform sit to stand with minimal assistance/contact guard assist. 
4.  Patient will participate in gait assessment if and when appropriate. 5.  Patient will perform wheelchair mobility over even surfaces at supervision level for at least 150 ft, including negotiation of doorways. 6.  Patient will perform wheelchair mobility up/down ramp, uneven sidewalk min A level. Outcome: Progressing Towards Goal 
 PHYSICAL THERAPY TREATMENT Patient: Quentin Sherwood (65 y.o. female) Date: 12/23/2019 Diagnosis: Status post laminectomy with spinal fusion [Z98.1] Status post laminectomy with spinal fusion Precautions: Chart, physical therapy assessment, plan of care and goals were reviewed. Time In: 1100 Time Out: 1230 Patient Seen For: Balance activities; Patient education; Therapeutic exercise;Transfer training; Wheelchair mobility(bedm mobility training) Pain: 
Pt pain was reported as  5/10, in back/incisional area, pre-treatment. Pt pain was reported as 9/10, in same location, at highest during session. Pt pain was reported as 7/10, in back/incisional area, post-treatment. Intervention: repositioning, pain meds requested from nursing Patient identified with name and : yes SUBJECTIVE:  
 
Pt stated, \"when my back begins to hurt/spasm, it gets hard for me to talk. \"  Pt indicated she \"hopes to be walking by the time I leave here\" Pt states \"I'm trying\" when attempting to get LE's to move during therex. OBJECTIVE DATA SUMMARY:  
Objective: BED/MAT MOBILITY Daily Assessment Rolling Left : 3 (Moderate assistance )(mat table w/out SR's; vc's & assist to maintain spinal prec) Supine to Sit : 2 (Maximal assistance) Sit to Supine : 2 (Maximal assistance) Pt attempting to help during supine/sit by using B UE's to help push up, but needs assist for trunk lift off, follow through, and to manage LE's on/off bed, as well as to maintain spinal precautions. TRANSFERS Daily Assessment Transfer Type: Lateral with transfer board Transfer Assistance : 2 (Maximal assistance) Car Transfers: Not tested Car Type: n/a Pt needs set up of board, but is able to lean to the side to assist with placing board under her hip/bottom. Pt needed varying mod to max A to achieve anterior wt shift and scooting of hips on board. Pt needed verbal cues and demonstration for better anterior wt shift to off weight bottom to achieve scoot. Pt also needed PT to assist with proper foot placement during transfer. Pt able to push through her B UE's to help perform transfers. GAIT Daily Assessment Amount of Assistance: 0 (Not tested) Distance (ft): 0 Feet (ft) STEPS or STAIRS Daily Assessment Steps/Stairs Ambulated (#): 0 Level of Assist : 0 (Not tested) BALANCE Daily Assessment Sitting - Static: Fair (occasional) Sitting - Dynamic: Poor (constant support) Standing - Static: Poor;Constant support Standing - Dynamic : Impaired WHEELCHAIR MOBILITY Daily Assessment Able to Propel (ft): 80 feet Functional Level: 2(minimal assistance, but <150 ft.) Curbs/Ramps Assist Required (FIM Score): 0 (Not tested) Wheelchair Setup Assist Required : 3 (Moderate assistance) Wheelchair Management: Manages left brake;Manages right brake(with brake extender) Pt uses B UE's to propel w/c, with PT helping with management of O2 tank. THERAPEUTIC EXERCISES Daily Assessment Extremity: Both Exercise Type #1: Supine lower extremity strengthening(AAROM: heel-slides, hip abd/add,SAQ's on bolster) Sets Performed: 2 Reps Performed: 10 Level of Assist: Moderate assistance Neuro Re-Education: 
Sitting balance at EOM, working on wt shifting, trunk strengthening, resisting against minor perturbations, posture, and reaching out of SERA. ASSESSMENT: 
Pt demonstrates improving sitting balance, posture sitting EOM unsupported; as well as minor improvement in ability to perform lateral scoot during transfer board transfers. Pt also is demonstrating slight increase in B LE strength as noted by ability to move LE in small range actively against gravity. Pt continues to need significant assistance with bed mobility, and attempts at sit to stand. Pt needs continued rehab to work toward ability to stand, walk, and perform stairs. Pt has steps to enter home and is currently at w/c level. Progression toward goals: 
[]      Improving appropriately and progressing toward goals [x]      Improving slowly and progressing toward goals []      Not making progress toward goals and plan of care will be adjusted PLAN: 
Patient continues to benefit from skilled intervention to address the above impairments. Continue treatment per established plan of care. Discharge Recommendations:  Home Health vs SNF Further Equipment Recommendations for Discharge:  Wheelchair and lateral transfer board currently used Estimated LOS: 4 weeks Activity Tolerance:  
Fair +, due to fatigue. Pt O2 sats at >94% during session with 3L of O2 donned via nasal cannula Please refer to the flowsheet for vital signs taken during this treatment. After treatment:  
Patient left sitting up in w/c in dining room, eating lunch, with supplemental O2 donned at 3 L/min Luis Daniel Hutchison, PT 
12/23/2019

## 2019-12-23 NOTE — PROGRESS NOTES
SHIFT CHANGE NOTE FOR [facility] Bedside and Verbal shift change report given to Kedar Russo RN (oncoming nurse) by Flor Lombardi RN (offgoing nurse). Report included the following information SBAR, Kardex, MAR and Recent Results. Situation: 
 Code Status: Full Code Reason for Admission: Spinal Thoracic laminectomy T 6-T 11 Hospital Day: 7 Problem List:  
Hospital Problems  Date Reviewed: 12/17/2019 Codes Class Noted POA Chronic respiratory failure with hypoxia (HCC) (Chronic) ICD-10-CM: J96.11 
ICD-9-CM: 518.83, 799.02  Unknown Yes Overview Signed 12/16/2019 10:11 PM by Mara De Los Santos MD  
  On home oxygen inhalation at 3 LPM via [place] Opioid dependence (HCC) (Chronic) ICD-10-CM: S03.44 ICD-9-CM: 304.00  Unknown Yes Overview Signed 12/16/2019 10:54 PM by Mara De Los Santos MD  
  On Methadone Acute blood loss as cause of postoperative anemia ICD-10-CM: D62 
ICD-9-CM: 285.1  12/12/2019 Yes Impaired mobility and ADLs ICD-10-CM: Z74.09 
ICD-9-CM: 799.89  12/11/2019 Yes Spinal cord compression (HCC) ICD-10-CM: G95.20 ICD-9-CM: 336.9  12/11/2019 Yes Compression fracture of body of thoracic vertebra (HCC) ICD-10-CM: S22.000A ICD-9-CM: 805.2  12/11/2019 Yes * (Principal) Status post laminectomy with spinal fusion ICD-10-CM: Z98.1 ICD-9-CM: V45.4  12/11/2019 Yes Overview Signed 12/16/2019 10:05 PM by Mara De Los Santos MD  
  S/P T10, T9, T8 laminectomy; T7, T8, T9, T10, T11, T12 posterior thoracic fusion; segmental spinal instrumentation; K2M Davis type, T7-T8, T11-T12 (12/11/2019 - Dr. Michelle Torres) History of fracture due to fall ICD-10-CM: Z87.81 ICD-9-CM: V15.51  12/11/2019 Yes Hypoxemia requiring supplemental oxygen (Chronic) ICD-10-CM: R09.02, Z99.81 ICD-9-CM: 799.02  12/29/2014 Yes Chronic obstructive pulmonary disease (COPD) (HCC) (Chronic) ICD-10-CM: J44.9 ICD-9-CM: 884  Unknown Yes Overview Signed 4/23/2013 10:01 AM by Yusra CALLES  
  SOB, on [place] O2 Recent admission with psychosis Hypertensive heart disease without heart failure (Chronic) ICD-10-CM: I11.9 ICD-9-CM: 402.90  Unknown Yes Overview Signed 4/23/2013 10:02 AM by Shilpi Ford Better controlled Type 2 diabetes mellitus with diabetic neuropathy, with long-term current use of insulin (HCC) (Chronic) ICD-10-CM: E11.40, Z79.4 ICD-9-CM: 250.60, 357.2, V58.67  Unknown Yes Overview Signed 12/18/2019  2:13 PM by Venu Hummel MD  
  HbA1c (12/11/2019) = 7.1 Background: 
 Past Medical History:  
Past Medical History:  
Diagnosis Date  Abnormally low high density lipoprotein (HDL) cholesterol with hypertriglyceridemia Lipid profile (11/6/2016) showed , , HDL 38, LDL 37  Acute blood loss as cause of postoperative anemia 12/12/2019  Anxiety  Bipolar affective disorder ([address] 12/5/2012  Cellulitis of right forearm 05/04/2017  Chronic back pain  Chronic obstructive pulmonary disease (COPD) ([address] SOB, on [place] O2 Recent admission with psychosis  Chronic respiratory failure with hypoxia ([address] On home oxygen inhalation at 3 LPM via [place]  
 Contusion of left elbow 5/4/2017  Depression  Diabetic neuropathy associated with type 2 diabetes mellitus ([address]  Diastolic dysfunction without heart failure Stable on diuretics  Falls frequently  Gastroesophageal reflux disease with hiatal hernia  Generalized osteoarthritis of multiple sites  Gout On Allopurinol  History of acute renal failure 5/31/2013  History of back injury   
 jumped out of second story window  History of fracture due to fall 12/11/2019  
 History of hepatitis C   
 treated  History of penicillin allergy  History of vitamin D deficiency 5/10/2017  Hypertensive heart disease without heart failure Better controlled  Hyperuricemia 5/26/2017  Hypothyroidism  Hypoxemia requiring supplemental oxygen 12/29/2014  Intravenous drug user 5/2/2017  Long term current use of aspirin  Memory difficulty  Menopause  Mixed connective tissue disease ([facility] 5/10/2017  Obesity, Class I, BMI 30-34.9  Olecranon bursitis of right elbow 5/4/2017  Opioid dependence ([facility] On Methadone  Recurrent genital herpes 5/31/2013  Right Achilles tendinitis 5/2/2017  Sarcoidosis  Sickle cell trait ([facility]  Tobacco use disorder  Type 2 diabetes mellitus with diabetic neuropathy, with long-term current use of insulin ([facility] HbA1c (12/11/2019) = 7.1  Urge urinary incontinence 5/10/2017  Venous insufficiency  Wears glasses Patient taking anticoagulants no Patient has a defibrillator: no  
 
Assessment: 
? Changes in Assessment throughout shift: None ? Patient has central line: no Reasons if yes: Removed 12/16/19 Insertion date:n/a Last dressing date:n/a 
? Patient has Renae Cath: no Reasons if yes: n/a Insertion date:n/a 
 
? Last Vitals: 
  
Vitals:  
 12/21/19 2100 12/22/19 2739 12/22/19 1626 12/22/19 2140 BP: 108/65 120/75 114/68 123/67 Pulse: 89 71 70 67 Resp: 16 17 18 18 Temp: 99.1 °F (37.3 °C) 98.7 °F (37.1 °C) 98.9 °F (37.2 °C) 97.9 °F (36.6 °C) SpO2: 100% 96% 100% 100% Weight:      
LMP: 11/25/2012  
 
 
? PAIN Pain Assessment Pain Intensity 1: 8 (12/23/19 0654) Pain Intensity 1: 2 (12/29/14 1105) Pain Location 1: Back Pain Location 1: Abdomen Pain Intervention(s) 1: Medication (see MAR) Pain Intervention(s) 1: Medication (see MAR) Patient Stated Pain Goal: 0 Patient Stated Pain Goal: 0 
o Intervention effective: yes   
o Other actions taken for pain: no c/o 
 
? Skin Assessment Skin color Skin Color: Appropriate for ethnicity Condition/Temperature Skin Condition/Temp: Dry, Warm Integrity Skin Integrity: Incision (comment) Turgor Turgor: Non-tenting Weekly Pressure Ulcer Documentation  Pressure  Injury Documentation: No Pressure Injury Noted-Pressure Ulcer Prevention Initiated Wound Prevention & Protection Methods Orientation of wound Orientation of Wound Prevention: Posterior Location of Prevention Location of Wound Prevention: Sacrum/Coccyx Dressing Present Dressing Present : No 
Dressing Status Wound Offloading Wound Offloading (Prevention Methods): Bed, pressure redistribution/air, Repositioning ? INTAKE/OUPUT Date 12/22/19 0700 - 12/23/19 3695 12/23/19 0700 - 12/24/19 1926 Shift 0700-1859 1900-0659 24 Hour Total 0700-1859 1900-0659 24 Hour Total  
INTAKE Shift Total(mL/kg) OUTPUT Urine(mL/kg/hr) Urine Occurrence(s) 1 x 3 x 4 x Stool Stool Occurrence(s) 1 x 1 x 2 x Shift Total(mL/kg) NET Weight (kg) 42.9 42.9 42.9 42.9 42.9 42.9 Recommendations: 1. Patient needs and requests: met 2. Diet: Cardiac DM Reg Vitaliy Sow liq 3. Pending tests/procedures: none 4. Functional Level/Equipment: wheelchair 5. Estimated Discharge Date: TBD Posted on Whiteboard in Patients Room: yes HEALS Safety Check A safety check occurred in the patient's room between off going nurse and oncoming nurse listed above. The safety check included the below items Area Items H High Alert Medications ? Verify all high alert medication drips (heparin, PCA, etc.) E Equipment ? Suction is set up for ALL patients (with yanker) ? Red plugs utilized for all equipment (IV pumps, etc.) ? WOWs wiped down at end of shift. ? Room stocked with oxygen, suction, and other unit-specific supplies A Alarms ? Bed alarm is set for fall risk patients ? Ensure chair alarm is in place and activated if patient is up in a chair L Lines ? Check IV for any infiltration ? Renae bag is empty if patient has a Renae ? Tubing and IV bags are labeled Indigo Clothing Safety ? Room is clean, patient is clean, and equipment is clean. ? Hallways are clear from equipment besides carts. ? Fall bracelet on for fall risk patients ? Ensure room is clear and free of clutter ? Suction is set up for ALL patients (with lashay) ? Hallways are clear from equipment besides carts. ? Isolation precautions followed, supplies available outside room, sign posted

## 2019-12-23 NOTE — ROUTINE PROCESS
0800 Pt. Awake sitting up in bed no change in assessment no signs of distress pt. Stated to be feeling fine. 0930 Pt. Sitting up in chair eating breafast. 
1200 pt. With therapy. 1330 able to transfer by wheelchair. 1500 no change in assessment. 1800 Pt. Sitting up in bed eating dinner.

## 2019-12-24 NOTE — PROGRESS NOTES
conducted a Follow up consultation and Spiritual Assessment for Rufino Abdi, who is a 62 y.o.,female. The  provided the following Interventions: 
Continued the relationship of care and support. Listened empathically. Offered assurance of continued prayer on patients behalf. Chart reviewed. The following outcomes were achieved: 
Patient expressed gratitude for 's visit. Assessment: 
There are no further spiritual or Rastafari issues which require Spiritual Care Services interventions at this time. Plan: 
Chaplains will continue to follow and will provide pastoral care on an as needed/requested basis.  recommends bedside caregivers page  on duty if patient shows signs of acute spiritual or emotional distress.  Thomas Matute [facility] Spiritual Care  
[phone]

## 2019-12-24 NOTE — PROGRESS NOTES
Problem: Self Care Deficits Care Plan (Adult) Goal: *Therapy Goal (Edit Goal, Insert Text) Description Occupational Therapy Goals Long Term Goals Initiated 12/17/19 and to be accomplished within 4 week(s)  to facilitate increased self care independence and strength for return to PLOF and decreased care giver burden: 2. Pt will perform grooming with Mod I.  
3. Pt will perform UB bathing with SBA. 4. Pt will perform LB bathing with Tyree. 5. Pt will perform tub/shower transfer <> TTB with Min A.  
6. Pt will perform UB dressing with Set-up. 7. Pt will perform LB dressing with Min A. 
8. Pt will perform toileting task with Min A. 
9. Pt will perform toilet transfer with Min A/CGA. Short Term Goals Initiated 12/17/19 and to be accomplished within 7 day(s) (12/24/19) to facilitate increased self care independence and strength for return to PLOF and decreased care giver burden: 1. Pt will perform self-feeding with Mod I.  
2. Pt will perform grooming with set-up. (GM) 3. Pt will perform UB bathing with Mod A. (GM) 4. Pt will perform LB bathing with Mod A in LRE. (GM) 5. Pt will perform tub/shower transfer <> TTB with MaxA x 1. 
6. Pt will perform UB dressing with SBA. 7. Pt will perform LB dressing with Mod A using AE as needed. (GM) 8. Pt will perform toileting task with Max A x 1. (GM) 9. Pt will perform toilet transfer with MaxA x 1. Outcome Measures: 
(12/17/19) Dynamometer  strength: Right= 27.33# (norm=55. 1#) Left= 34.67# (norm=45.7#) (12/24/19) 9-hole peg test: Right=45.46 seconds ; Left=41.13 seconds Outcome: Progressing Towards Goal 
 OT WEEKLY PROGRESS NOTE Patient Name:Stephen Sinha Time Spent With Patient Time In: 1500 Time Out: 2352 Patient Seen For[de-identified] PM;ADLs;Other (see progress notes) Time In: 1400 Time Out: 1430 Medical Diagnosis:  Status post laminectomy with spinal fusion [Z98.1] Status post laminectomy with spinal fusion Pain at start of tx:7/10 pain or discomfort. Pain at stop of tx:7/10 pain or discomfort. Patient identified with name and :yes Subjective: \"My daughter is down from [place]. \" 
 
  
 
Objective: During first session, pt participated in in-hand manipulation activity in preparation for performing self-care tasks. Pt used small pegs and picked up one-at-a-time (x2) and placed in palm of hand. Pt then translated one to finger tips and dropped into cone of same color that was placed on tabletop in front of her. During second session, pt participated in card game seated w/c level at table. Pt was required to hold cards in left hand and draw/discard cards onto table, reaching forward to place. Pt required severla rest breaks 2/2 back spasms. Pt required to return to room as pt was incontinent. Pt was returned to room to get cleaned up. Outcome Measures: AROM: Decreased ROM BUE to 90 degrees. COGNITION/PERCEPTION Initial Assessment Weekly Progress Assessment 2019 Premorbid Reading Status Literate  Literate Premorbid Writing Status [facility] Arousal/Alertness Generalized responses Orientation Level Oriented X4  Oriented X4 Visual Fields ([facility] reading pt's board ) Praxis Intact  Intact Body Scheme Appears intact COMPREHENSION MODE Initial Assessment Weekly Progress Assessment 2019 Primary Mode of Comprehension Auditory  Auditory Hearing Aide None Corrective Lenses Glasses  Glasses Score 6  6 EXPRESSION Initial Assessment Weekly Progress Assessment 2019 Primary Mode of Expression Verbal Verbal  
Score 6 6 Comments SOCIAL INTERACTION/ PRAGMATICS Initial Assessment Weekly Progress Assessment 2019 Score 5 5 Comments re-direction to task at times re-direction to task at times PROBLEM SOLVING Initial Assessment Weekly Progress Assessment 2019 Score 4 4 Comments min cues for following spinal precautions min cues for following spinal precautions MEMORY Initial Assessment Weekly Progress Assessment 12/24/2019 Score 5 5 Comments EATING Initial Assessment Weekly Progress Assessment 12/24/2019 Functional Level 5  5 Comments Pt requiring encouragement to demo container mgmt I'ly, requiring set-up over all. Pt able to demo utensil use I'ly Pt open containers and sugar packers than self feed independently (note dated 12/23/19-V Megan Figueroa) GROOMING Initial Assessment Weekly Progress Assessment 12/24/2019 Functional Level 4 Grooming Grooming Assistance : 6 (Modified independent) Tasks completed by patient Washed face Comments Pt washing face seated EOB with Elida for sitting balance when pt letting go of bed railing to perform grooming task. Comments: Pt washed face and removed dentures to perform cleaning with paper towel Mod I seated in w/c  
 
BATHING Initial Assessment Weekly Progress Assessment 12/24/2019 Functional Level 2 UB: 5 (Supervision) LB: 3 (Moderate assistance) Body parts patient bathed Abdomen, Chest, Thigh, left, Thigh, right Comments UB: Pt requiring modA, pt demo'ing washing adbomen/chest with CGA for sitting balance. Pt requiring (A) for thoroughness for UEs and back. LB: Pt requiring maxA for LB bathing seated EOB/supine/sidelying. Pt assisting with washing thighs seated EOB, requiring therapist (A) for remainder. Pt requiring maxA to roll side:side following log roll in bed to perform perineal bathing. Comment (UB): Pt set up at sink, performing UB bathing with supervision. Comments (LB): Pt set up with LH sponge washing feet,min assist with rinsing. Pt than washed tyrell area and rolled side to side to have buttocks washed dependent. (note dated 12/23/19-V Megan Figueroa) TUB/SHOWER TRANSFER INDEPENDENCE Initial Assessment Weekly Progress Assessment 12/24/2019 Score 0  0  
 Comments NT at this time due to safety and medical status   NT  
 
UPPER BODY DRESSING/UNDRESSING Initial Assessment Weekly Progress Assessment 12/24/2019 Functional Level 3  5 (Supervision) Items applied/Steps completed Pullover (4 steps) Comments Pt requiring modA to [facility] gown and modA to shante t-shirt. Pt unable to pull down shirt anterior/posterior. Comments: Pt donned under shirt and blouse seated, cues on holding and pulling under shirt and blouse from the tail (note dated 12/23/19-V Juli [place]) LOWER BODY DRESSING/UNDRESSING Initial Assessment Weekly Progress Assessment 12/24/2019 Functional Level 1  3 (Moderate assistance) Items applied/Steps completed Sock, left (1 step), Sock, right (1 step), Elastic waist pants (3 steps), Underpants (3 steps) Comments Pt requiring total asst for compression stockings/sock mgmt with pt seated EOB. Pt rolling side:side with maxA with rehab tech present to asst with brief mgmt and donning pants. Pt attempting to bridge hips to shante pants over hips with pt unable to fully clear sacrum. Comments: Max assist donning Gabriel stocking. Educate pt on using reacher to assist with donning pants. Pt needed assist threading LE's into pants legs 2/2 LE's weakness. than rolled to have pants donned over buttocks dependently. Pt needed assist donning sock onto sock aid 2/2 tremor in left hand than donning sock onto feet with supervision. 
 
(note dated 12/23/19-V Juli [place]) TOILETING Initial Assessment Weekly Progress Assessment 12/24/2019 Functional Level 1 Toileting Toileting Assistance (FIM Score): 2 (Maximal assistance) Cues: Physical assistance for pants down;Physical assistance for pants up Comments Pt incontinent of bladder/bowel, requiring total asst for hygiene and brief mgmt from bed level. TOILET TRANSFER INDEPENDENCE Initial Assessment Weekly Progress Assessment 12/24/2019 Transfer score 0  0 Comments NT at this time    NT  
 
  
 
 ASSESSMENT: 
Pt making progress with independence with self-care tasks. Pt has achieved 5/9 STGs. Pt should continue to make progress as pain levels decrease. Progression toward goals: 
[x]          Improving appropriately and progressing toward goals 
[]          Improving slowly and progressing toward goals 
[]          Not making progress toward goals and plan of care will be adjusted PLAN: 
Patient continues to benefit from skilled intervention to address the above impairments. Continue treatment per established plan of care. Discharge Recommendations:  Home Health with 24/7 assistance Further Equipment Recommendations for Discharge:  Drop arm 3:1 commode Please refer to the flow sheet for vital signs taken during this treatment. After treatment:  
[]  Patient left in no apparent distress sitting up in chair 
[x]  Patient left in no apparent distress in bed 
[x]  Call bell left within reach 
[]  Nursing notified 
[x]  Caregiver present (supportive daughter) []  Bed alarm activated COMMUNICATION/EDUCATION:  
[] Home safety education was provided and the patient/caregiver indicated understanding. [] Patient/family have participated as able in goal setting and plan of care. [x] Patient/family agree to work toward stated goals and plan of care. [] Patient understands intent and goals of therapy, but is neutral about his/her participation. [] Patient is unable to participate in goal setting and plan of care. Plan of Care: Please see Care Plan for updated STG/LTGs. Family Training: To be scheduled Estimated LOS: 1/14/2020 [address] [place] [address], [place]/L 
12/24/2019

## 2019-12-24 NOTE — PROGRESS NOTES
Problem: Mobility Impaired (Adult and Pediatric) Goal: *Therapy Goal (Edit Goal, Insert Text) Description Physical Therapy Goals: STG Initiated 12/17/2019, re-assessed 12/24/19 and to be accomplished within 7 day(s) on 12/31/2019: 
1. Patient will move from supine to sit and sit to supine , scoot up and down and roll side to side in bed with moderate assistance .   (mod/maxA) 2. Patient will transfer from bed to chair and chair to bed with maximal assistance with one person assist using the least restrictive device. (MET with slide board) 3. Patient will perform sit to stand with maximal assistance with one person assist using appropriate AD. (unable at this time) 4. Patient will perform static sitting balance without UE support for at least 2 minutes, demonstrating appropriate upright and midline posture at supervision level for ease of preparation for transfers. (CGA) 5. Patient will perform wheelchair mobility moderate assist for 150 ft distance over even surfaces. (MET) Physical Therapy Goals: LTG Initiated 12/17/2019 and to be accomplished within 28 day(s) on 01/14/2019:  
1. Patient will move from supine to sit and sit to supine , scoot up and down and roll side to side in bed with supervision/set-up. 2.  Patient will transfer from bed to chair and chair to bed with contact guard assist/stand-by assist using the least restrictive device. 3.  Patient will perform sit to stand with minimal assistance/contact guard assist. 
4.  Patient will participate in gait assessment if and when appropriate. 5.  Patient will perform wheelchair mobility over even surfaces at supervision level for at least 150 ft, including negotiation of doorways. 6.  Patient will perform wheelchair mobility up/down ramp, uneven sidewalk min A level. Outcome: Progressing Towards Goal 
 PHYSICAL THERAPY WEEKLY PROGRESS NOTE Patient: Dang Lebron (65 y.o. female) Date: 12/24/2019 Diagnosis: Status post laminectomy with spinal fusion [Z98.1] Status post laminectomy with spinal fusion Precautions:  fall risk, spinal precautions Chart, physical therapy assessment, plan of care and goals were reviewed. Pain: 
Pt pain was reported as  8/10 pre-treatment. Pt pain was reported as 7/10 post-treatment. Intervention: pt reports having taken pain meds Time in:1136 Time out:1240 Pt seen for: txfr training, balance activity Time in: 3519 Time out: 1502 Pt seen for: w/c mobility, therapeutic exercises Patient identified with name and : yes SUBJECTIVE:  
 
Pt reports \"I'm getting more sensation and now I can more my toes. \" OBJECTIVE DATA SUMMARY:  
AROM: grossly decreased, non-functional 
 
FIM SCORES Initial Assessment Weekly Progress Assessment 2019 Bed/Chair/Wheelchair Transfers 1 2 Wheelchair Mobility 2 4 Walking [place] 0 1 Steps/Stairs 0 1 Please see [facility] Interdisciplinary Eval: Coordination/Balance Section for details regarding FIM score description. BED/CHAIR/WHEELCHAIR TRANSFERS Initial Assessment Weekly Progress Assessment 2019 Rolling Right 2 (Maximal assistance) 3 (Moderate assistance ) Rolling Left 2 (Maximal assistance) 3 (Moderate assistance ) Supine to Sit 2 (Maximal assistance) 2 (Maximal assistance) Sit to Stand Total assistance Unable at this time Sit to Supine 2 (Maximal assistance) 2 (Maximal assistance) Transfer Type Lateral with transfer board Lateral with transfer board Comments using transfer board needing max A x 2 for safety, cues for sequencing and appropriate scooting technique  Pt required mod/max assist for slide board txfrs with max v/c for proper hand placement and head/hips relationship. Car Transfer Not tested  NT Car Type N/A  NA  
 
GROSS ASSESSMENT Weekly Progress Assessment 2019 AROM Grossly decreased, non-functional  
Strength Grossly decreased, non-functional  
 Coordination Grossly decreased, non-functional  
Tone Normal  
Sensation Impaired PROM Within functional limits POSTURE Weekly Progress Assessment 12/24/2019 Posture (WDL) Exceptions to [facility] Posture Assessment Forward head;Rounded shoulders [facility] MOBILITY/MANAGEMENT Initial Assessment Weekly Progress Assessment 12/24/2019 Able to Propel 140 feet(max A for steering) 150 feet Curbs/ramps assistance required 0 (Not tested) 0 (Not tested) Wheelchair set up assistance required 2 (Maximal assistance)  Melakiara [place]ni Wheelchair management Manages left brake(needing assist with brakes) Manages left brake;Manages right brake(with extender) WALKING INDEPENDENCE Initial Assessment Weekly Progress Assessment 12/24/2019 Assistive device (N/A)  NA Ambulation assistance - level surface Distance 0 Feet (ft)  0 Comments not tested at this time due to safety concern Unable at this time STEPS/STAIRS Initial Assessment Weekly Progress Assessment 12/24/2019 Steps/Stairs ambulated 0  0 Rail Use (N/A)  NA Comments unable to assess at this time due to safety/weakness  Unable at this time Curbs/Ramps (not yet able to assess)  Unable at this time Neuro Re-Education: 
Pt sat of EOM x20 minutes with frequent CGA for returning to midline posture due to lean posteriorly or to right. Pt performed rolling large swiss ball fwd 2x10 for anterior wt shifting and trunk strengthening. ASSESSMENT: 
Pt has met 2/5 STGs but continues to require mod/max assist for slide board txfrs and BLE too weak for sit to stand. Pt's activity tolerance has improved and pt participating better with therapy. Pt continues to require cues and max assist for bed mobility and to maintain spinal precautions. Progression toward goals: 
[]      Improving appropriately and progressing toward goals [x]      Improving slowly and progressing toward goals []      Not making progress toward goals and plan of care will be adjusted PLAN: 
Patient continues to benefit from skilled intervention to address the above impairments. Continue treatment per established plan of care. Discharge Recommendations:  Home Health with 24 hour care Further Equipment Recommendations for Discharge:  wheelchair 18 inch and slide board Estimated LOS: 1/14/20 Activity Tolerance:  
fair Please refer to the flowsheet for vital signs taken during this treatment. After treatment:  
[] Patient left in no apparent distress in bed 
[x] Patient left in no apparent distress sitting up in chair passed off to OT 
[] Patient left in no apparent distress in w/c mobilizing under own power 
[] Patient left in no apparent distress dining area 
[] Patient left in no apparent distress mobilizing under own power 
[] Call bell left within reach 
[] Nursing notified 
[] Caregiver present 
[] Bed alarm activated Pierce Dubin, PTA 
12/24/2019

## 2019-12-24 NOTE — PROGRESS NOTES
Problem: Self Care Deficits Care Plan (Adult) Goal: *Therapy Goal (Edit Goal, Insert Text) Description Occupational Therapy Goals Long Term Goals Initiated 19 and to be accomplished within 4 week(s)  to facilitate increased self care independence and strength for return to PLOF and decreased care giver burden: 2. Pt will perform grooming with Mod I. 
3. Pt will perform UB bathing with SBA. 4. Pt will perform LB bathing with Tyree. 5. Pt will perform tub/shower transfer <> TTB with Min A.  
6. Pt will perform UB dressing with Set-up. 7. Pt will perform LB dressing with Min A. 
8. Pt will perform toileting task with Min A. 
9. Pt will perform toilet transfer with Min A/CGA. Short Term Goals Initiated 19 and to be accomplished within 7 day(s) (19) to facilitate increased self care independence and strength for return to PLOF and decreased care giver burden: 1. Pt will perform self-feeding with Mod I.  
2. Pt will perform grooming with set-up. 3. Pt will perform UB bathing with Mod A. 
4. Pt will perform LB bathing with Mod A in LRE. 5. Pt will perform tub/shower transfer <> TTB with MaxA x 1. 
6. Pt will perform UB dressing with SBA. 7. Pt will perform LB dressing with Mod A using AE as needed. 8. Pt will perform toileting task with Max A x 1. 
9. Pt will perform toilet transfer with MaxA x 1. Outcome Measures: 
(19) Dynamometer  strength: Right= 27.33# (norm=55. 1#) Left= 34.67# (norm=45.7#) Outcome: Progressing Towards Goal 
 OCCUPATIONAL THERAPY TREATMENT Patient: Lee Ann Epstein 62 y.o. Patient identified with name and : Yes  
 
Date: 2019 First Tx Session Time In: 1330 Time Out[de-identified] 1400 Diagnosis: Status post laminectomy with spinal fusion [Z98.1] Precautions:   
Chart, occupational therapy assessment, plan of care, and goals were reviewed. Pain: 
Pt reports 8/10 pain or discomfort prior to treatment. Pt reports 8/10 pain or discomfort post treatment. Intervention Provided: Notified RN for pain medication; provided at end of session SUBJECTIVE:  
Patient stated I need my legs.  (re: leg rests) OBJECTIVE DATA SUMMARY:  
 
THERAPEUTIC EXERCISE Daily Assessment B UE therex to increase strength/ROM and endurance  Sitting middle of w/c to increase unsupported sitting/endurance pt utilized 1# dowel to complete forward rows 2 x10 reps and backwards rows 2 x10 reps. Pt required moderate rest breaks and cues for pacing throughout therex GROOMING Daily Assessment Grooming Grooming Assistance : 6 (Modified independent) Comments: Pt washed face and removed dentures to perform cleaning with paper towel Mod I seated in w/c MOBILITY/TRANSFERS Daily Assessment W/c mobility provided Total A dining room>gym ASSESSMENT: 
Pt requires moderate rest breaks and encouragement for deep breathing/activity pacing and completing repetitions. Progression toward goals: 
[x]          Improving appropriately and progressing toward goals 
[]          Improving slowly and progressing toward goals 
[]          Not making progress toward goals and plan of care will be adjusted PLAN: 
Patient continues to benefit from skilled intervention to address the above impairments. Continue treatment per established plan of care. Discharge Recommendations:  Home Health with 24/7 Assistance Further Equipment Recommendations for Discharge:  Drop arm 3:1 commode Activity Tolerance: 
Fair; moderate rest breaks Estimated LOS: 1/14/2020 Please refer to the flowsheet for vital signs taken during this treatment. After treatment:  
[x]  Patient left in no apparent distress sitting up in chair in gym for second OT session 
[]  Patient left in no apparent distress in bed 
[]  Call bell left within reach 
[]  Nursing notified 
[]  Caregiver present 
[]  Bed alarm activated COMMUNICATION/EDUCATION:  
 [x] Home safety education was provided and the patient/caregiver indicated understanding. [x] Patient/family have participated as able in goal setting and plan of care. [x] Patient/family agree to work toward stated goals and plan of care. [] Patient understands intent and goals of therapy, but is neutral about his/her participation. [] Patient is unable to participate in goal setting and plan of care.  
 
 
SABINE Patino/JANNET

## 2019-12-24 NOTE — PROGRESS NOTES
SHIFT CHANGE NOTE FOR Celestia Lombard Bedside and Verbal shift change report given to Vy Stewart (oncoming nurse) by Dori Delacruz (offgoing nurse). Report included the following information SBAR, Kardex, MAR and Recent Results. Situation: 
 Code Status: Full Code Reason for Admission: Spinal Thoracic laminectomy T 6-T 11 Hospital Day: 8 Problem List:  
Hospital Problems  Date Reviewed: 12/23/2019 Codes Class Noted POA Chronic respiratory failure with hypoxia (HCC) (Chronic) ICD-10-CM: J96.11 
ICD-9-CM: 518.83, 799.02  Unknown Yes Overview Signed 12/16/2019 10:11 PM by Jolly Gee MD  
  On home oxygen inhalation at 3 LPM via [place] Opioid dependence (HCC) (Chronic) ICD-10-CM: X99.82 ICD-9-CM: 304.00  Unknown Yes Overview Signed 12/16/2019 10:54 PM by Jolly Gee MD  
  On Methadone Acute blood loss as cause of postoperative anemia ICD-10-CM: D62 
ICD-9-CM: 285.1  12/12/2019 Yes Impaired mobility and ADLs ICD-10-CM: Z74.09 
ICD-9-CM: 799.89  12/11/2019 Yes Spinal cord compression (HCC) ICD-10-CM: G95.20 ICD-9-CM: 336.9  12/11/2019 Yes Compression fracture of body of thoracic vertebra (HCC) ICD-10-CM: S22.000A ICD-9-CM: 805.2  12/11/2019 Yes * (Principal) Status post laminectomy with spinal fusion ICD-10-CM: Z98.1 ICD-9-CM: V45.4  12/11/2019 Yes Overview Signed 12/16/2019 10:05 PM by Jolly Gee MD  
  S/P T10, T9, T8 laminectomy; T7, T8, T9, T10, T11, T12 posterior thoracic fusion; segmental spinal instrumentation; K2M Davis type, T7-T8, T11-T12 (12/11/2019 - Dr. Tiarra Gray) History of fracture due to fall ICD-10-CM: Z87.81 ICD-9-CM: V15.51  12/11/2019 Yes Hypoxemia requiring supplemental oxygen (Chronic) ICD-10-CM: R09.02, Z99.81 ICD-9-CM: 799.02  12/29/2014 Yes Chronic obstructive pulmonary disease (COPD) (HCC) (Chronic) ICD-10-CM: J44.9 ICD-9-CM: 478  Unknown Yes Overview Signed 4/23/2013 10:01 AM by Elliott CALLES  
  SOB, on [place] O2 Recent admission with psychosis Hypertensive heart disease without heart failure (Chronic) ICD-10-CM: I11.9 ICD-9-CM: 402.90  Unknown Yes Overview Signed 4/23/2013 10:02 AM by Antonella Anthony Better controlled Type 2 diabetes mellitus with diabetic neuropathy, with long-term current use of insulin (HCC) (Chronic) ICD-10-CM: E11.40, Z79.4 ICD-9-CM: 250.60, 357.2, V58.67  Unknown Yes Overview Signed 12/18/2019  2:13 PM by Anamaria Cedeno MD  
  HbA1c (12/11/2019) = 7.1 Background: 
 Past Medical History:  
Past Medical History:  
Diagnosis Date  Abnormally low high density lipoprotein (HDL) cholesterol with hypertriglyceridemia Lipid profile (11/6/2016) showed , , HDL 38, LDL 37  Acute blood loss as cause of postoperative anemia 12/12/2019  Anxiety  Bipolar affective disorder ([address] 12/5/2012  Cellulitis of right forearm 05/04/2017  Chronic back pain  Chronic obstructive pulmonary disease (COPD) ([address] SOB, on [place] O2 Recent admission with psychosis  Chronic respiratory failure with hypoxia ([address] On home oxygen inhalation at 3 LPM via [place]  
 Contusion of left elbow 5/4/2017  Depression  Diabetic neuropathy associated with type 2 diabetes mellitus ([address]  Diastolic dysfunction without heart failure Stable on diuretics  Falls frequently  Gastroesophageal reflux disease with hiatal hernia  Generalized osteoarthritis of multiple sites  Gout On Allopurinol  History of acute renal failure 5/31/2013  History of back injury   
 jumped out of second story window  History of fracture due to fall 12/11/2019  
 History of hepatitis C   
 treated  History of penicillin allergy  History of vitamin D deficiency 5/10/2017  Hypertensive heart disease without heart failure Better controlled  Hyperuricemia 5/26/2017  Hypothyroidism  Hypoxemia requiring supplemental oxygen 12/29/2014  Intravenous drug user 5/2/2017  Long term current use of aspirin  Memory difficulty  Menopause  Mixed connective tissue disease ([facility] 5/10/2017  Obesity, Class I, BMI 30-34.9  Olecranon bursitis of right elbow 5/4/2017  Opioid dependence ([facility] On Methadone  Recurrent genital herpes 5/31/2013  Right Achilles tendinitis 5/2/2017  Sarcoidosis  Sickle cell trait ([facility]  Tobacco use disorder  Type 2 diabetes mellitus with diabetic neuropathy, with long-term current use of insulin ([facility] HbA1c (12/11/2019) = 7.1  Urge urinary incontinence 5/10/2017  Venous insufficiency  Wears glasses Patient taking anticoagulants no Patient has a defibrillator: no  
 
Assessment: 
? Changes in Assessment throughout shift: None ? Patient has central line: no Reasons if yes: Removed 12/16/19 Insertion date:n/a Last dressing date:n/a 
? Patient has Renae Cath: no Reasons if yes: n/a Insertion date:n/a 
 
? Last Vitals: 
  
Vitals:  
 12/22/19 2140 12/23/19 0741 12/23/19 1524 12/23/19 2227 BP: 123/67 116/67 111/66 108/66 Pulse: 67 71 68 72 Resp: 18 20 18 18 Temp: 97.9 °F (36.6 °C) 97.9 °F (36.6 °C) 99.2 °F (37.3 °C) 97 °F (36.1 °C) SpO2: 100% 98% 98% 99% Weight:      
LMP: 11/25/2012  
 
 
? PAIN Pain Assessment Pain Intensity 1: 0 (12/24/19 0357) Pain Intensity 1: 2 (12/29/14 1105) Pain Location 1: Back Pain Location 1: Abdomen Pain Intervention(s) 1: Medication (see MAR) Pain Intervention(s) 1: Medication (see MAR) Patient Stated Pain Goal: 0 Patient Stated Pain Goal: 0 
o Intervention effective: yes   
o Other actions taken for pain: no c/o 
 
? Skin Assessment Skin color Skin Color: Appropriate for ethnicity Condition/Temperature Skin Condition/Temp: Warm Integrity Skin Integrity: Incision (comment), Abrasion Turgor Turgor: Non-tenting Weekly Pressure Ulcer Documentation  Pressure  Injury Documentation: No Pressure Injury Noted-Pressure Ulcer Prevention Initiated Wound Prevention & Protection Methods Orientation of wound Orientation of Wound Prevention: Posterior Location of Prevention Location of Wound Prevention: Sacrum/Coccyx Dressing Present Dressing Present : No 
Dressing Status Wound Offloading Wound Offloading (Prevention Methods): Bed, pressure redistribution/air, Bed, pressure reduction mattress ? INTAKE/OUPUT Date 12/23/19 0700 - 12/24/19 1329 12/24/19 0700 - 12/25/19 5199 Shift [id number] 1900-0659 24 Hour Total 7344-3377 2718-2883 24 Hour Total  
INTAKE  
P.O. 1260  1260     
  P. O. 1260  1260 Shift Total(mL/kg) 8410(72.5)  [id number]) OUTPUT Urine(mL/kg/hr) Urine Occurrence(s) 7 x 3 x 10 x Stool Stool Occurrence(s) 0 x 1 x 1 x Shift Total(mL/kg) NET 1260  1260 Weight (kg) 42.9 42.9 42.9 42.9 42.9 42.9 Recommendations: 1. Patient needs and requests: met 2. Diet: Cardiac DM Reg Ayse stilesq 3. Pending tests/procedures: none 4. Functional Level/Equipment: wheelchair 5. Estimated Discharge Date: TBD Posted on Whiteboard in Patients Room: yes HEALS Safety Check A safety check occurred in the patient's room between off going nurse and oncoming nurse listed above. The safety check included the below items Area Items H High Alert Medications ? Verify all high alert medication drips (heparin, PCA, etc.) E Equipment ? Suction is set up for ALL patients (with yanker) ? Red plugs utilized for all equipment (IV pumps, etc.) ? WOWs wiped down at end of shift. ? Room stocked with oxygen, suction, and other unit-specific supplies A Alarms ? Bed alarm is set for fall risk patients ? Ensure chair alarm is in place and activated if patient is up in a chair L Lines ? Check IV for any infiltration ? Renae bag is empty if patient has a Renae ? Tubing and IV bags are labeled Jadine Ugarte Safety ? Room is clean, patient is clean, and equipment is clean. ? Hallways are clear from equipment besides carts. ? Fall bracelet on for fall risk patients ? Ensure room is clear and free of clutter ? Suction is set up for ALL patients (with lashay) ? Hallways are clear from equipment besides carts. ? Isolation precautions followed, supplies available outside room, sign posted

## 2019-12-24 NOTE — PROGRESS NOTES
SHIFT CHANGE NOTE FOR Gael Randall Bedside and Verbal shift change report given to Mc Barraza RN (oncoming nurse) by Chente Mccabe RN (offgoing nurse). Report included the following information SBAR, Kardex, MAR and Recent Results. Situation: 
 Code Status: Full Code Reason for Admission: Spinal Thoracic laminectomy T 6-T 11 Hospital Day: 7 Problem List:  
Hospital Problems  Date Reviewed: 12/23/2019 Codes Class Noted POA Chronic respiratory failure with hypoxia (HCC) (Chronic) ICD-10-CM: J96.11 
ICD-9-CM: 518.83, 799.02  Unknown Yes Overview Signed 12/16/2019 10:11 PM by Joel Bill MD  
  On home oxygen inhalation at 3 LPM via [place] Opioid dependence (HCC) (Chronic) ICD-10-CM: C69.08 ICD-9-CM: 304.00  Unknown Yes Overview Signed 12/16/2019 10:54 PM by Joel Bill MD  
  On Methadone Acute blood loss as cause of postoperative anemia ICD-10-CM: D62 
ICD-9-CM: 285.1  12/12/2019 Yes Impaired mobility and ADLs ICD-10-CM: Z74.09 
ICD-9-CM: 799.89  12/11/2019 Yes Spinal cord compression (HCC) ICD-10-CM: G95.20 ICD-9-CM: 336.9  12/11/2019 Yes Compression fracture of body of thoracic vertebra (HCC) ICD-10-CM: S22.000A ICD-9-CM: 805.2  12/11/2019 Yes * (Principal) Status post laminectomy with spinal fusion ICD-10-CM: Z98.1 ICD-9-CM: V45.4  12/11/2019 Yes Overview Signed 12/16/2019 10:05 PM by Joel Bill MD  
  S/P T10, T9, T8 laminectomy; T7, T8, T9, T10, T11, T12 posterior thoracic fusion; segmental spinal instrumentation; K2M Davis type, T7-T8, T11-T12 (12/11/2019 - Dr. Ryan Valenzuela) History of fracture due to fall ICD-10-CM: Z87.81 ICD-9-CM: V15.51  12/11/2019 Yes Hypoxemia requiring supplemental oxygen (Chronic) ICD-10-CM: R09.02, Z99.81 ICD-9-CM: 799.02  12/29/2014 Yes Chronic obstructive pulmonary disease (COPD) (HCC) (Chronic) ICD-10-CM: J44.9 ICD-9-CM: 778  Unknown Yes Overview Signed 4/23/2013 10:01 AM by Hector CALLES  
  SOB, on [place] O2 Recent admission with psychosis Hypertensive heart disease without heart failure (Chronic) ICD-10-CM: I11.9 ICD-9-CM: 402.90  Unknown Yes Overview Signed 4/23/2013 10:02 AM by Ruel Green Better controlled Type 2 diabetes mellitus with diabetic neuropathy, with long-term current use of insulin (HCC) (Chronic) ICD-10-CM: E11.40, Z79.4 ICD-9-CM: 250.60, 357.2, V58.67  Unknown Yes Overview Signed 12/18/2019  2:13 PM by Sonja Whitaker MD  
  HbA1c (12/11/2019) = 7.1 Background: 
 Past Medical History:  
Past Medical History:  
Diagnosis Date  Abnormally low high density lipoprotein (HDL) cholesterol with hypertriglyceridemia Lipid profile (11/6/2016) showed , , HDL 38, LDL 37  Acute blood loss as cause of postoperative anemia 12/12/2019  Anxiety  Bipolar affective disorder ([address] 12/5/2012  Cellulitis of right forearm 05/04/2017  Chronic back pain  Chronic obstructive pulmonary disease (COPD) ([address] SOB, on [place] O2 Recent admission with psychosis  Chronic respiratory failure with hypoxia ([address] On home oxygen inhalation at 3 LPM via [place]  
 Contusion of left elbow 5/4/2017  Depression  Diabetic neuropathy associated with type 2 diabetes mellitus ([address]  Diastolic dysfunction without heart failure Stable on diuretics  Falls frequently  Gastroesophageal reflux disease with hiatal hernia  Generalized osteoarthritis of multiple sites  Gout On Allopurinol  History of acute renal failure 5/31/2013  History of back injury   
 jumped out of second story window  History of fracture due to fall 12/11/2019  
 History of hepatitis C   
 treated  History of penicillin allergy  History of vitamin D deficiency 5/10/2017  Hypertensive heart disease without heart failure Better controlled  Hyperuricemia 5/26/2017  Hypothyroidism  Hypoxemia requiring supplemental oxygen 12/29/2014  Intravenous drug user 5/2/2017  Long term current use of aspirin  Memory difficulty  Menopause  Mixed connective tissue disease ([facility] 5/10/2017  Obesity, Class I, BMI 30-34.9  Olecranon bursitis of right elbow 5/4/2017  Opioid dependence ([facility] On Methadone  Recurrent genital herpes 5/31/2013  Right Achilles tendinitis 5/2/2017  Sarcoidosis  Sickle cell trait ([facility]  Tobacco use disorder  Type 2 diabetes mellitus with diabetic neuropathy, with long-term current use of insulin ([facility] [facility] HbA1c (12/11/2019) = 7.1  Urge urinary incontinence 5/10/2017  Venous insufficiency  Wears glasses Patient taking anticoagulants no Patient has a defibrillator: no  
 
Assessment: 
? Changes in Assessment throughout shift: None ? Patient has central line: no Reasons if yes: Removed 12/16/19 Insertion date:n/a Last dressing date:n/a 
? Patient has Renae Cath: no Reasons if yes: n/a Insertion date:n/a 
 
? Last Vitals: 
  
Vitals:  
 12/22/19 1626 12/22/19 2140 12/23/19 0741 12/23/19 1524 BP: 114/68 123/67 116/67 111/66 Pulse: 70 67 71 68 Resp: 18 18 20 18 Temp: 98.9 °F (37.2 °C) 97.9 °F (36.6 °C) 97.9 °F (36.6 °C) 99.2 °F (37.3 °C) SpO2: 100% 100% 98% 98% Weight:      
LMP: 11/25/2012  
 
 
? PAIN Pain Assessment Pain Intensity 1: 8 (12/23/19 1844) Pain Intensity 1: 2 (12/29/14 1105) Pain Location 1: Back Pain Location 1: Abdomen Pain Intervention(s) 1: Medication (see MAR) Pain Intervention(s) 1: Medication (see MAR) Patient Stated Pain Goal: 0 Patient Stated Pain Goal: 0 
o Intervention effective: yes   
o Other actions taken for pain: no c/o 
 
? Skin Assessment Skin color Skin Color: Appropriate for ethnicity Condition/Temperature Skin Condition/Temp: Dry, Warm Integrity Skin Integrity: Incision (comment) Turgor Turgor: Non-tenting Weekly Pressure Ulcer Documentation  Pressure  Injury Documentation: No Pressure Injury Noted-Pressure Ulcer Prevention Initiated Wound Prevention & Protection Methods Orientation of wound Orientation of Wound Prevention: Posterior Location of Prevention Location of Wound Prevention: Sacrum/Coccyx Dressing Present Dressing Present : No 
Dressing Status Wound Offloading Wound Offloading (Prevention Methods): Bed, pressure redistribution/air, Repositioning ? INTAKE/OUPUT Date 12/22/19 1900 - 12/23/19 7333 12/23/19 0700 - 12/24/19 3383 Shift 0060-7639 24 Hour Total 2283-3991 7291-3379 24 Hour Total  
INTAKE  
P.O.   1260  1260  
  P. O.   1260  1260 Shift Total(mL/kg)   0540(61.3)  [id number]) OUTPUT Urine(mL/kg/hr) Urine Occurrence(s) 3 x 4 x 7 x  7 x Stool Stool Occurrence(s) 1 x 2 x 0 x  0 x Shift Total(mL/kg) NET   1260  1260 Weight (kg) 42.9 42.9 42.9 42.9 42.9 Recommendations: 1. Patient needs and requests: met 2. Diet: Cardiac DM Reg Lolly Blanchard liq 3. Pending tests/procedures: none 4. Functional Level/Equipment: wheelchair 5. Estimated Discharge Date: TBD Posted on Whiteboard in Patients Room: yes HEALS Safety Check A safety check occurred in the patient's room between off going nurse and oncoming nurse listed above. The safety check included the below items Area Items H High Alert Medications ? Verify all high alert medication drips (heparin, PCA, etc.) E Equipment ? Suction is set up for ALL patients (with yanker) ? Red plugs utilized for all equipment (IV pumps, etc.) ? WOWs wiped down at end of shift. ? Room stocked with oxygen, suction, and other unit-specific supplies A Alarms ? Bed alarm is set for fall risk patients ? Ensure chair alarm is in place and activated if patient is up in a chair L Lines ? Check IV for any infiltration ? Renae bag is empty if patient has a Renae ? Tubing and IV bags are labeled [employer] Safety ? Room is clean, patient is clean, and equipment is clean. ? Hallways are clear from equipment besides carts. ? Fall bracelet on for fall risk patients ? Ensure room is clear and free of clutter ? Suction is set up for ALL patients (with lashay) ? Hallways are clear from equipment besides carts. ? Isolation precautions followed, supplies available outside room, sign posted

## 2019-12-25 NOTE — PROGRESS NOTES
SHIFT CHANGE NOTE FOR Fay Mckeon Bedside and Verbal shift change report given to Irving Eid (oncoming nurse) by Marlo Hernandez RN (offgoing nurse). Report included the following information SBAR, Kardex, MAR and Recent Results. Situation: 
 Code Status: Full Code Reason for Admission: Spinal Thoracic laminectomy T 6-T 11 Hospital Day: 9 Problem List:  
Hospital Problems  Date Reviewed: 12/24/2019 Codes Class Noted POA Chronic respiratory failure with hypoxia (HCC) (Chronic) ICD-10-CM: J96.11 
ICD-9-CM: 518.83, 799.02  Unknown Yes Overview Signed 12/16/2019 10:11 PM by Beth Nunez MD  
  On home oxygen inhalation at 3 LPM via [place] Opioid dependence (HCC) (Chronic) ICD-10-CM: R18.71 ICD-9-CM: 304.00  Unknown Yes Overview Signed 12/16/2019 10:54 PM by Beth Nunez MD  
  On Methadone Acute blood loss as cause of postoperative anemia ICD-10-CM: D62 
ICD-9-CM: 285.1  12/12/2019 Yes Impaired mobility and ADLs ICD-10-CM: Z74.09 
ICD-9-CM: 799.89  12/11/2019 Yes Spinal cord compression (HCC) ICD-10-CM: G95.20 ICD-9-CM: 336.9  12/11/2019 Yes Compression fracture of body of thoracic vertebra (HCC) ICD-10-CM: S22.000A ICD-9-CM: 805.2  12/11/2019 Yes * (Principal) Status post laminectomy with spinal fusion ICD-10-CM: Z98.1 ICD-9-CM: V45.4  12/11/2019 Yes Overview Signed 12/16/2019 10:05 PM by Beth Nunez MD  
  S/P T10, T9, T8 laminectomy; T7, T8, T9, T10, T11, T12 posterior thoracic fusion; segmental spinal instrumentation; K2M Winters type, T7-T8, T11-T12 (12/11/2019 - Dr. Jazmin Arias) History of fracture due to fall ICD-10-CM: Z87.81 ICD-9-CM: V15.51  12/11/2019 Yes Hypoxemia requiring supplemental oxygen (Chronic) ICD-10-CM: R09.02, Z99.81 ICD-9-CM: 799.02  12/29/2014 Yes Chronic obstructive pulmonary disease (COPD) (HCC) (Chronic) ICD-10-CM: J44.9 ICD-9-CM: 459  Unknown Yes Overview Signed 4/23/2013 10:01 AM by Harmeet CALLES  
  SOB, on [place] O2 Recent admission with psychosis Hypertensive heart disease without heart failure (Chronic) ICD-10-CM: I11.9 ICD-9-CM: 402.90  Unknown Yes Overview Signed 4/23/2013 10:02 AM by Andria Arizmendi Better controlled Type 2 diabetes mellitus with diabetic neuropathy, with long-term current use of insulin (HCC) (Chronic) ICD-10-CM: E11.40, Z79.4 ICD-9-CM: 250.60, 357.2, V58.67  Unknown Yes Overview Signed 12/18/2019  2:13 PM by Blas Zee MD  
  HbA1c (12/11/2019) = 7.1 Background: 
 Past Medical History:  
Past Medical History:  
Diagnosis Date  Abnormally low high density lipoprotein (HDL) cholesterol with hypertriglyceridemia Lipid profile (11/6/2016) showed , , HDL 38, LDL 37  Acute blood loss as cause of postoperative anemia 12/12/2019  Anxiety  Bipolar affective disorder ([address] 12/5/2012  Cellulitis of right forearm 05/04/2017  Chronic back pain  Chronic obstructive pulmonary disease (COPD) ([address] SOB, on [place] O2 Recent admission with psychosis  Chronic respiratory failure with hypoxia ([address] On home oxygen inhalation at 3 LPM via [place]  
 Contusion of left elbow 5/4/2017  Depression  Diabetic neuropathy associated with type 2 diabetes mellitus ([address]  Diastolic dysfunction without heart failure Stable on diuretics  Falls frequently  Gastroesophageal reflux disease with hiatal hernia  Generalized osteoarthritis of multiple sites  Gout On Allopurinol  History of acute renal failure 5/31/2013  History of back injury   
 jumped out of second story window  History of fracture due to fall 12/11/2019  
 History of hepatitis C   
 treated  History of penicillin allergy  History of vitamin D deficiency 5/10/2017  Hypertensive heart disease without heart failure Better controlled  Hyperuricemia 5/26/2017  Hypothyroidism  Hypoxemia requiring supplemental oxygen 12/29/2014  Intravenous drug user 5/2/2017  Long term current use of aspirin  Memory difficulty  Menopause  Mixed connective tissue disease ([facility] 5/10/2017  Obesity, Class I, BMI 30-34.9  Olecranon bursitis of right elbow 5/4/2017  Opioid dependence ([facility] On Methadone  Recurrent genital herpes 5/31/2013  Right Achilles tendinitis 5/2/2017  Sarcoidosis  Sickle cell trait ([facility] [facility]  Tobacco use disorder  Type 2 diabetes mellitus with diabetic neuropathy, with long-term current use of insulin ([facility] HbA1c (12/11/2019) = 7.1  Urge urinary incontinence 5/10/2017  Venous insufficiency  Wears glasses Patient taking anticoagulants no Patient has a defibrillator: no  
 
Assessment: 
? Changes in Assessment throughout shift: None ? Patient has central line: no Reasons if yes: Removed 12/16/19 Insertion date:n/a Last dressing date:n/a 
? Patient has Renae Cath: no Reasons if yes: n/a Insertion date:n/a 
 
? Last Vitals: 
  
Vitals:  
 12/23/19 2227 12/24/19 0720 12/24/19 1802 12/24/19 2133 BP: 108/66 148/89 113/67 126/69 Pulse: 72 80 76 84 Resp: 18 16 16 18 Temp: 97 °F (36.1 °C) 98 °F (36.7 °C) 98.9 °F (37.2 °C) 98 °F (36.7 °C) SpO2: 99% 98% 98% 96% Weight:      
LMP: 11/25/2012  
 
 
? PAIN Pain Assessment Pain Intensity 1: 0 (12/25/19 0430) Pain Intensity 1: 2 (12/29/14 1105) Pain Location 1: Back, Incisional Pain Location 1: Abdomen Pain Intervention(s) 1: Medication (see MAR) Pain Intervention(s) 1: Medication (see MAR) Patient Stated Pain Goal: 0 Patient Stated Pain Goal: 0 
o Intervention effective: yes   
o Other actions taken for pain: no c/o 
 
? Skin Assessment Skin color Skin Color: Appropriate for ethnicity Condition/Temperature Skin Condition/Temp: Warm Integrity Skin Integrity: Incision (comment) Turgor Turgor: Non-tenting Weekly Pressure Ulcer Documentation  Pressure  Injury Documentation: No Pressure Injury Noted-Pressure Ulcer Prevention Initiated Wound Prevention & Protection Methods Orientation of wound Orientation of Wound Prevention: Posterior Location of Prevention Location of Wound Prevention: Sacrum/Coccyx Dressing Present Dressing Present : No 
Dressing Status Wound Offloading Wound Offloading (Prevention Methods): Bed, pressure redistribution/air ? INTAKE/OUPUT Date 12/24/19 0700 - 12/25/19 5169 12/25/19 0700 - 12/26/19 0768 Shift 0700-1859 1900-0659 24 Hour Total 0700-1859 1900-0659 24 Hour Total  
INTAKE Shift Total(mL/kg) OUTPUT Urine(mL/kg/hr) Urine Occurrence(s)  2 x 2 x Stool Stool Occurrence(s)  0 x 0 x Shift Total(mL/kg) NET Weight (kg) 42.9 42.9 42.9 42.9 42.9 42.9 Recommendations: 1. Patient needs and requests: met 2. Diet: Cardiac DM Reg [facility] liq 3. Pending tests/procedures: none 4. Functional Level/Equipment: wheelchair 5. Estimated Discharge Date: TBD Posted on Whiteboard in Patients Room: yes HEALS Safety Check A safety check occurred in the patient's room between off going nurse and oncoming nurse listed above. The safety check included the below items Area Items H High Alert Medications ? Verify all high alert medication drips (heparin, PCA, etc.) E Equipment ? Suction is set up for ALL patients (with yanker) ? Red plugs utilized for all equipment (IV pumps, etc.) ? WOWs wiped down at end of shift. ? Room stocked with oxygen, suction, and other unit-specific supplies A Alarms ? Bed alarm is set for fall risk patients ? Ensure chair alarm is in place and activated if patient is up in a chair L Lines ? Check IV for any infiltration ? Renae bag is empty if patient has a Renae ? Tubing and IV bags are labeled Nena Helm Safety ? Room is clean, patient is clean, and equipment is clean. ? Hallways are clear from equipment besides carts. ? Fall bracelet on for fall risk patients ? Ensure room is clear and free of clutter ? Suction is set up for ALL patients (with lashay) ? Hallways are clear from equipment besides carts. ? Isolation precautions followed, supplies available outside room, sign posted

## 2019-12-25 NOTE — ROUTINE PROCESS
0800 Pt. Awake sitting up in bed no change in assessment no signs of distress pt. Stated to be feeling fine. 0930 Pt. Sitting up in bed eating breakfast. 
1200 no complaints. 1330 able to transfer by wheelchair. 1500 no change in assessment. 1800 Pt. Sitting up in chair eating dinner.

## 2019-12-25 NOTE — PROGRESS NOTES
[id number] 
[address] [place] [address], [address] INPATIENT REHABILITATION 
DAILY PROGRESS NOTE Date: 12/25/2019 Name: Rosanna Jackson Age / Sex: 62 y.o. / female CSN: [id number] MRN: [id number] Admit Date: 12/16/2019 Length of Stay: 9 days Primary Rehab Diagnosis: Impaired Mobility and ADLs secondary to: 
1. S/P T10, T9, T8 laminectomy; T7, T8, T9, T10, T11, T12 posterior thoracic fusion; segmental spinal instrumentation; K2M Davis type, T7-T8, T11-T12 (12/11/2019 - Dr. Jennifer Arora) 2. History of acute compression fractures of body of thoracic vertebrae (T9 and T10) due to fall Subjective:  
 
Patient seen and examined. Blood pressure controlled. Blood glucose controlled. Patient's Complaint:  
No significant medical complaints Pain Control: stable, mild-to-moderate joint symptoms intermittently, reasonably well controlled by current meds Objective:  
 
Vital Signs: 
Patient Vitals for the past 24 hrs: 
 BP Temp Pulse Resp SpO2  
12/24/19 2133 126/69 98 °F (36.7 °C) 84 18 96 % 12/24/19 1802 113/67 98.9 °F (37.2 °C) 76 16 98 % Physical Examination: 
GENERAL SURVEY: Patient is awake, alert, oriented x 3, sitting comfortably on the chair, not in acute respiratory distress. HEENT: pale palpebral conjunctivae, anicteric sclerae, no nasoaural discharge, moist oral mucosa NECK: supple, no jugular venous distention, no palpable lymph nodes CHEST/LUNGS: symmetrical chest expansion, good air entry, clear breath sounds HEART: adynamic precordium, good S1 S2, no S3, regular rhythm, no murmurs ABDOMEN: obese, bowel sounds appreciated, soft, non-tender EXTREMITIES: pale nailbeds, no edema, full and equal pulses, no calf tenderness NEUROLOGICAL EXAM: The patient is awake, alert and oriented x3, able to answer questions fairly appropriately, able to follow 1 and 2 step commands. Able to tell time from the wall clock. Cranial nerves II-XII are grossly intact. No gross sensory deficit. Motor strength is 4-/5 on BUE, 2+/5 on the RLE (except for 1/5 on the right ankle), 2-/5 on the LLE (except for 2+/5 on the left knee).   
Incision(s): healing well, clean, dry, and intact and serosanguineous Current Medications: 
Current Facility-Administered Medications Medication Dose Route Frequency  insulin glargine (LANTUS) injection 25 Units  25 Units SubCUTAneous ACB  insulin lispro (HUMALOG) injection 4 Units  4 Units SubCUTAneous TIDAC  lubiPROStone (AMITIZA) capsule 24 mcg  24 mcg Oral DAILY WITH BREAKFAST  guaiFENesin ER (MUCINEX) tablet 600 mg  600 mg Oral Q12H  
 acetaminophen (TYLENOL) tablet 650 mg  650 mg Oral Q4H PRN  
 bisacodyl (DULCOLAX) tablet 10 mg  10 mg Oral Q48H PRN  
 ferrous sulfate tablet 325 mg  1 Tab Oral DAILY WITH BREAKFAST  ascorbic acid (vitamin C) (VITAMIN C) tablet 250 mg  250 mg Oral DAILY WITH BREAKFAST  insulin lispro (HUMALOG) injection   SubCUTAneous TIDAC  senna-docusate (PERICOLACE) 8.6-50 mg per tablet 2 Tab  2 Tab Oral PCD  methadone (DOLOPHINE) tablet 20 mg  20 mg Oral DAILY  oxyCODONE-acetaminophen (PERCOCET 10)  mg per tablet 1 Tab  1 Tab Oral Q4H PRN  predniSONE (DELTASONE) tablet 30 mg  30 mg Oral DAILY WITH BREAKFAST  famotidine (PEPCID) tablet 20 mg  20 mg Oral BID  rosuvastatin (CRESTOR) tablet 5 mg  5 mg Oral QHS  albuterol (PROVENTIL VENTOLIN) nebulizer solution 2.5 mg  2.5 mg Nebulization Q4H PRN  
 levothyroxine (SYNTHROID) tablet 75 mcg  75 mcg Oral 6am  
 cholecalciferol (VITAMIN D3) (400 Units /10 mcg) tablet 5 Tab  2,000 Units Oral DAILY  calcium citrate tablet 200 mg [elemental]  200 mg Oral BID  divalproex DR (DEPAKOTE) tablet 500 mg  500 mg Oral QHS  fluticasone-vilanterol (BREO ELLIPTA) 100mcg-25mcg/puff  1 Puff Inhalation DAILY  oxybutynin (DITROPAN) tablet 5 mg  5 mg Oral BID  allopurinol (ZYLOPRIM) tablet 100 mg  100 mg Oral DAILY  sertraline (ZOLOFT) tablet 50 mg  50 mg Oral DAILY  methocarbamol (ROBAXIN) tablet 500 mg  500 mg Oral Q8H  
 aspirin chewable tablet 81 mg  81 mg Oral DAILY WITH BREAKFAST  docusate sodium (COLACE) capsule 100 mg  100 mg Oral DAILY AFTER BREAKFAST Allergies: Allergies Allergen Reactions  Moxifloxacin Rash  Pcn [Penicillins] Swelling  Sulfa (Sulfonamide Antibiotics) Swelling Functional Progress: PHYSICAL THERAPY 
 
ON ADMISSION MOST RECENT Wheelchair Mobility/Management Able to Propel (ft): 140 feet(max A for steering) Functional Level: 2 Curbs/Ramps Assist Required (FIM Score): 0 (Not tested) Wheelchair Setup Assist Required : 2 (Maximal assistance) Wheelchair Management: Manages left brake(needing assist with brakes) Wheelchair Mobility/Management Able to Propel (ft): 150 feet Functional Level: 4 Curbs/Ramps Assist Required (FIM Score): 0 (Not tested) Wheelchair Setup Assist Required : 3 (Moderate assistance) Wheelchair Management: Manages left brake, Manages right brake(with extender) Gait Amount of Assistance: 0 (Not tested) Distance (ft): 0 Feet (ft) Assistive Device: (N/A) Gait Amount of Assistance: 0 (Not tested) Distance (ft): 0 Feet (ft) Assistive Device: (N/A) Balance-Sitting/Standing Sitting - Static: Fair (occasional) Sitting - Dynamic: Fair (occasional), Occassional, Poor (constant support) Standing - Static: Poor, Constant support Standing - Dynamic : (not tested) Balance-Sitting/Standing Sitting - Static: Fair (occasional) Sitting - Dynamic: Fair (occasional)(-) Standing - Static: Poor, Constant support Standing - Dynamic : Impaired Bed/Mat Mobility Rolling Right : 2 (Maximal assistance) Rolling Left : 2 (Maximal assistance) Supine to Sit : 2 (Maximal assistance) Sit to Supine : 2 (Maximal assistance) Bed/Mat Mobility Rolling Right : 3 (Moderate assistance ) Rolling Left : 3 (Moderate assistance ) Supine to Sit : 2 (Maximal assistance) Sit to Supine : 2 (Maximal assistance) Transfers Transfer Type: Lateral with transfer board Transfer Assistance : 1 (Total assistance)(Max A x 2 for safety, sequencing, appropriate ant trunk ) Sit to Stand Assistance: Total assistance Car Transfers: Not tested Car Type: N/A Transfers Transfer Type: Lateral with transfer board Transfer Assistance : 3 (Moderate assistance ) Sit to Stand Assistance: Unable at this time Car Transfers: Not tested Car Type: n/a Steps or Stairs Steps/Stairs Ambulated (#): 0 Level of Assist : 0 (Not tested) Rail Use: (N/A) Steps or Stairs Steps/Stairs Ambulated (#): 0 Level of Assist : 0 (Not tested) Rail Use: (N/A) Lab/Data Review: 
Recent Results (from the past 24 hour(s)) GLUCOSE, POC Collection Time: 12/24/19  4:55 PM  
Result Value Ref Range Glucose (POC) 226 (H) 70 - 110 mg/dL GLUCOSE, POC Collection Time: 12/24/19  8:27 PM  
Result Value Ref Range Glucose (POC) 295 (H) 70 - 110 mg/dL GLUCOSE, POC Collection Time: 12/25/19  7:49 AM  
Result Value Ref Range Glucose (POC) 111 (H) 70 - 110 mg/dL GLUCOSE, POC Collection Time: 12/25/19 11:21 AM  
Result Value Ref Range Glucose (POC) 137 (H) 70 - 110 mg/dL Estimated Glomerular Filtration Rate: On admission, estimated GFR based on a Creatinine of 0.68 mg/dl: 
            Using CKD-EPI = 107.9 mL/min/1.73m2 Using MDRD = 112.4 mL/min/1.73m2 Most recent estimated GFR, based on a Creatinine of 0.79 mg/dl on 12/23/2019: 
 Using CKD-EPI = 92.3 mL/min/1.73m2 Using MDRD = 94.5 mL/min/1.73m2 Assessment:  
 
Primary Rehabilitation Diagnosis 1. Impaired Mobility and ADLs 2.  S/P T10, T9, T8 laminectomy; T7, T8, T9, T10, T11, T12 posterior thoracic fusion; segmental spinal instrumentation; K2M Davis type, T7-T8, T11-T12 (12/11/2019 - Dr. Julia Munoz) 3. History of acute compression fractures of body of thoracic vertebrae (T9 and T10) due to fall 
  
Comorbidities  Diabetic neuropathy associated with type 2 diabetes mellitus (HCC) E11.40  Venous insufficiency I87.2  Obesity, Class I, BMI 30-34.9 E66.9  Chronic back pain M54.9, G89.29  
 Generalized osteoarthritis of multiple sites M15.9  Bipolar affective disorder  F31.9  Abnormally low high density lipoprotein (HDL) cholesterol with hypertriglyceridemia E78.6, E78.1  Diastolic dysfunction without heart failure I51.89  Chronic obstructive pulmonary disease (COPD)  J44.9  Hypertensive heart disease without heart failure I11.9  Depression F32.9  History of back injury Z87.828  Type 2 diabetes mellitus with diabetic neuropathy, with long-term current use of insulin  E11.40, Z79.4  Anxiety F41.9  Wears glasses Z97.3  History of hepatitis C Z86.19  
 Sickle cell trait D57.3  History of acute renal failure Z87.448  Hypothyroidism E03.9  Recurrent genital herpes A60.00  Sarcoidosis D86.9  Abscess of right arm L02.413  Hypoxemia requiring supplemental oxygen R09.02, Z99.81  
 Abnormal nuclear stress test R94.39  
 Cellulitis and abscess of hand L03.119, L02.519  
 Cellulitis and abscess of foot L03.119, L02.619  
 Cellulitis of arm, right L03. 113  
 Olecranon bursitis of right elbow M70.21  
 Contusion of left elbow S50. 02XA  Intravenous drug user F19.90  Right Achilles tendinitis M76.61  
 History of penicillin allergy Z88.0  Falls frequently R29.6  Gastroesophageal reflux disease with hiatal hernia K21.9, K44.9  Memory difficulty R41.3  Mixed connective tissue disease  M35.1  Hyperuricemia E79.0  History of vitamin D deficiency Z86.39  
 Urge urinary incontinence N39.41  
  Acute blood loss as cause of postoperative anemia D62  
 History of fracture due to fall Z87.81  Chronic respiratory failure with hypoxia  J96.11  
 Cellulitis of right forearm L03. 113  
 Gout M10.9  Menopause Z78.0  Long term current use of aspirin Z79.82  
 Opioid dependence  F11.20  Tobacco use disorder F17.200 Plan: 1. Justification for continued stay: Good progression towards established rehabilitation goals. 2. Medical Issues being followed closely: 
  [x]  Fall and safety precautions  
  []  Wound Care  
  [x]  Bowel and Bladder Function  
  [x]  Fluid Electrolyte and Nutrition Balance  
  []  Pain Control 3. Issues that 24 hour rehabilitation nursing is following: 
  [x]  Fall and safety precautions  
  []  Wound Care  
  [x]  Bowel and Bladder Function  
  [x]  Fluid Electrolyte and Nutrition Balance  
  []  Pain Control   
  [x]  Assistance with and education on in-room safety with transfers to and from the bed, wheelchair, toilet and shower. 4. Acute rehabilitation plan of care: 
  [x]  Continue current care and rehab. [x]  Physical Therapy [x]  Occupational Therapy  
        []  Speech Therapy  
  []  Hold Rehab until further notice 5. Medications: 
  [x]  MAR Reviewed [x]  Continue Present Medications 6. DVT Prophylaxis:   
  []  Lovenox  
  []  Unfractionated Heparin  
  []  Coumadin  
  []  NOAC [x]  ROBERT Stockings  
  []  Sequential Compression Device []  None 7. Orders:  
> S/P T10, T9, T8 laminectomy; T7, T8, T9, T10, T11, T12 posterior thoracic fusion; segmental spinal instrumentation; K2M Davis type, T7-T8, T11-T12 (12/11/2019 - Dr. Ish Joshi); History of acute compression fractures of body of thoracic vertebrae (T9 and T10) due to fall 
 > Thoracic spinal precautions 
 > Continue: 
  > Calcium citrate 200 mg PO BID 
  > Cholecalciferol 2,000 units PO once daily 
 
> Acute Postoperative Blood Loss Anemia > Anemia work-up (12/13/2019) showed serum iron 11, TIBC 215, iron % saturation 5, ferritin 146 
 > Hgb/Hct (12/17/2019, on admission) = 7.4/23.1 
 > Reticulocyte count (12/17/2019) = 2.5 
 > Hgb/Hct (12/23/2019) = 8.5/26.7  
 > Continue: 
  > FeSO4 325 mg PO once daily with breakfast  
  > Ascorbic Acid 250 mg PO once daily with breakfast (to enhance the absorption of the FeSO4)  
 
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 
 > Prior to admission, the patient stated she was on Metolazone 2.5 mg PO q Mon-Wed-Fri 
 > Metolazone was not given during the patient's hospital stay at [facility]  
 > Upon admission to the ARU, held Metolazone  
 
> Chronic obstructive pulmonary disease; Chronic respiratory failure with hypoxemia requiring supplemental oxygen (3 LPM)  > Continue: 
  > Fluticasone-Vilanterol 100-25 1 puff inhaled once daily 
  > O2 inhalation at 3 LPM via nasal cannula continuous 
 
> Opioid dependence 
 > Continue Methadone 20 mg PO once daily 
 
> Type 2 diabetes mellitus with stage 3 chronic kidney disease 
 > HbA1c (12/11/2019) = 7.1 
 > On 12/22/2019: 
  > Increased Insulin glargine from 20 units to 22 units PO once daily before breakfast 
  > Started Insulin lispro 3 units SC TID AC  
 > On 12/24/2019, increased Insulin lispro from 3 units to 4 units SC TID AC  
 > Continue: 
  > Increase Insulin glargine from 22 units to 25 units PO once daily before breakfast 
  > Insulin lispro 4 units SC TID AC  
  > Insulin lispro sliding scale SC TID AC only 
 
> Cough 
 > On 12/18/2019, started Guaifenesin  mg PO q 12 hr 
 > Continue Guaifenesin  mg PO q 12 hr 
 
> Constipation 
 > On 12/17/2019, started: 
  > Docusate sodium 100 mg PO once daily after breakfast 
  > PeriColace 2 tabs PO once daily after dinner 
 > On 12/22/2019, started Lubiprostone 24 mcg PO once daily with breakfast 
 > Continue: 
  > Docusate sodium 100 mg PO once daily after breakfast 
  > PeriColace 2 tabs PO once daily after dinner 
  > Lubiprostone 24 mcg PO once daily with breakfast 
  
> Analgesia 
 > Continue: 
  > Acetaminophen 650 mg PO q 4 hr PRN for pain level less than 5/10 
  > Methocarbamol 500 mg PO q 8 hr 
  > Percocet 10/325 1 tab PO q 4 hr PRN for pain level greater than 4/10  
 
> Diet: 
 > Specifications: Cardiac, diabetic consistent carb 1800 kcal, no concentrated sweets, low purine 
 > Solids (consistency): Regular  
 > Liquids (consistency): Thin 8. Patient's progress in rehabilitation and medical issues discussed with the patient. All questions answered to the best of my ability. Care plan discussed with patient and nurse. Signed:    Beena Cain MD 
  December 25, 2019

## 2019-12-26 NOTE — PROGRESS NOTES
Problem: Mobility Impaired (Adult and Pediatric) Goal: *Therapy Goal (Edit Goal, Insert Text) Description Physical Therapy Goals: STG Initiated 12/17/2019, re-assessed 12/24/19 and to be accomplished within 7 day(s) on 12/31/2019: 
1. Patient will move from supine to sit and sit to supine , scoot up and down and roll side to side in bed with moderate assistance .   (mod/maxA) 2. Patient will transfer from bed to chair and chair to bed with maximal assistance with one person assist using the least restrictive device. (MET with slide board) 3. Patient will perform sit to stand with maximal assistance with one person assist using appropriate AD. (unable at this time) 4. Patient will perform static sitting balance without UE support for at least 2 minutes, demonstrating appropriate upright and midline posture at supervision level for ease of preparation for transfers. (CGA) 5. Patient will perform wheelchair mobility moderate assist for 150 ft distance over even surfaces. (MET) Physical Therapy Goals: LTG Initiated 12/17/2019 and to be accomplished within 28 day(s) on 01/14/2019:  
1. Patient will move from supine to sit and sit to supine , scoot up and down and roll side to side in bed with supervision/set-up. 2.  Patient will transfer from bed to chair and chair to bed with contact guard assist/stand-by assist using the least restrictive device. 3.  Patient will perform sit to stand with minimal assistance/contact guard assist. 
4.  Patient will participate in gait assessment if and when appropriate. 5.  Patient will perform wheelchair mobility over even surfaces at supervision level for at least 150 ft, including negotiation of doorways. 6.  Patient will perform wheelchair mobility up/down ramp, uneven sidewalk min A level. Outcome: Progressing Towards Goal 
 PHYSICAL THERAPY TREATMENT Patient: Dell Wick (65 y.o. female) Date: 12/26/2019 Diagnosis: Status post laminectomy with spinal fusion [Z98.1] Status post laminectomy with spinal fusion Precautions:  fall risk, spinal precautions Chart, physical therapy assessment, plan of care and goals were reviewed. Time In: 930 Time Out:  Patient Seen For: Balance activities; Patient education; Therapeutic exercise;Transfer training Pain: 
Pt pain was reported as  8/10 pre-treatment. Pt pain was reported as 8/10 post-treatment. Intervention: NA  
 
Patient identified with name and : yes SUBJECTIVE:  
 
Pt requests to wash her clothes with therapy. OBJECTIVE DATA SUMMARY:  
Objective:  
 
BALANCE Daily Assessment Sitting - Static: Fair (occasional) Sitting - Dynamic: Fair (occasional)(-) Pt placed detergent and clothes in washing machine from seated position in w/c, reaching fwd off back to seat. THERAPEUTIC EXERCISES Daily Assessment Seated BLE LAQ 2x10, hip flexion 2x10 with modA and hip add with ball 2x10 ASSESSMENT: 
Pt continues to demo significant weakness in BLE but improving with AROM. Progression toward goals: 
[]      Improving appropriately and progressing toward goals [x]      Improving slowly and progressing toward goals 
[]      Not making progress toward goals and plan of care will be adjusted PLAN: 
Patient continues to benefit from skilled intervention to address the above impairments. Continue treatment per established plan of care. Discharge Recommendations:  Home Health with 24 hour care Further Equipment Recommendations for Discharge:  wheelchair 18 inch and slide board and ramp Estimated LOS: 20 Activity Tolerance:  
fair Please refer to the flowsheet for vital signs taken during this treatment. After treatment:  
Patient left sitting in room on 3L o2 with call coffey in reach. Mark Shelton, PTA 
2019

## 2019-12-26 NOTE — PROGRESS NOTES
Problem: Mobility Impaired (Adult and Pediatric) Goal: *Therapy Goal (Edit Goal, Insert Text) Description Physical Therapy Goals: STG Initiated 12/17/2019, re-assessed 12/24/19 and to be accomplished within 7 day(s) on 12/31/2019: 
1. Patient will move from supine to sit and sit to supine , scoot up and down and roll side to side in bed with moderate assistance .   (mod/maxA) 2. Patient will transfer from bed to chair and chair to bed with maximal assistance with one person assist using the least restrictive device. (MET with slide board) 3. Patient will perform sit to stand with maximal assistance with one person assist using appropriate AD. (unable at this time) 4. Patient will perform static sitting balance without UE support for at least 2 minutes, demonstrating appropriate upright and midline posture at supervision level for ease of preparation for transfers. (CGA) 5. Patient will perform wheelchair mobility moderate assist for 150 ft distance over even surfaces. (MET) Physical Therapy Goals: LTG Initiated 12/17/2019 and to be accomplished within 28 day(s) on 01/14/2019:  
1. Patient will move from supine to sit and sit to supine , scoot up and down and roll side to side in bed with supervision/set-up. 2.  Patient will transfer from bed to chair and chair to bed with contact guard assist/stand-by assist using the least restrictive device. 3.  Patient will perform sit to stand with minimal assistance/contact guard assist. 
4.  Patient will participate in gait assessment if and when appropriate. 5.  Patient will perform wheelchair mobility over even surfaces at supervision level for at least 150 ft, including negotiation of doorways. 6.  Patient will perform wheelchair mobility up/down ramp, uneven sidewalk min A level. Outcome: Progressing Towards Goal 
 PHYSICAL THERAPY TREATMENT Patient: Racquel Rea (65 y.o. female) Date: 12/26/2019 Diagnosis: Status post laminectomy with spinal fusion [Z98.1] Status post laminectomy with spinal fusion Precautions:  spinal precautions, falls, O2 Chart, physical therapy assessment, plan of care and goals were reviewed. Time In: 930 Time Out:  Patient Seen For: Balance activities; Patient education; Therapeutic exercise;Transfer training Pain: 
Pt pain was reported as  8/10, cervical/incisional pre-treatment. Pt pain was reported as 9/10, same location post-treatment. Intervention: rest, repositioning, pt had pain meds prior to session Patient identified with name and : yes SUBJECTIVE:  
 
Pt continues to report increased pain in low back/thoracic area with activities to work on core strengthening. Pt noted her toes were \"burning\" today. OBJECTIVE DATA SUMMARY:  
Objective: BED/MAT MOBILITY Daily Assessment Rolling Left : 3 (Moderate assistance ) Supine to Sit : 2 (Maximal assistance)(max A to initiate, mod A to follow through) Sit to Supine : 2 (Maximal assistance)(for LE mgm't and roll onto back to maintain spinal prec) During sup to sit, pt attempts to push up with B UE's, but needs assist to initiate lift off of trunk from side-lying position, but then is able to complete task with mod A to achieve upright seated posture. Pt also needs max A to manage LE's off EOB. TRANSFERS Daily Assessment Transfer Type: Lateral with transfer board Transfer Assistance : 3 (Moderate assistance )(after set-up of w/c and board) Pt needs vc's for sequencing and hand placement, as well as assistance for repositioning B feet; but is demonstrating improving wt shift and scoot across board. BALANCE Daily Assessment Sitting - Static: Fair (occasional) Sitting - Dynamic: Fair (occasional)(-) THERAPEUTIC EXERCISES Daily Assessment Extremity: Both Exercise Type #1: Supine lower extremity strengthening(AAROM: heel-slides, hip abd, SAQ's) Sets Performed: 2(1 set on hip abd) Reps Performed: 10 Level of Assist: Maximum assistance(varies mod to max A) During hip abd, a slideboard and pillow case utilized to decrease friction to allow increased AROM. Pt also performed 1 set of 10 reps of manually resisted leg press ex's in supine with minimal resistance from PT. Neuro Re-Education: 
Sitting balance training to work on balance, posture, and core strengthening. Activities included: balloon hit; using large red swiss ball with B UE's on it and rolling forward/backward and left/right in small range; and leaning on elbow to L side, then to R side on mat table to use lateral trunk muscles in prep for supine/sit activity (x 3 reps each direction). ASSESSMENT: 
Pt is cooperative, but continues to progress at slow pace due to ongoing weakness in B LE's and back muscles with ongoing pain issues. Pt is improving at scooting/wt shifting during lateral transfer board transfers, but continues with need for set-up and assist with LE repositioning. Sit/stand training still deferred due to weakness in LE's and UE's to push up. Progression toward goals: 
[]      Improving appropriately and progressing toward goals [x]      Improving slowly and progressing toward goals 
[]      Not making progress toward goals and plan of care will be adjusted PLAN: 
Patient continues to benefit from skilled intervention to address the above impairments. Continue treatment per established plan of care. Discharge Recommendations:  SNF vs Home Health with 24 hr assistance Further Equipment Recommendations for Discharge:  TBD, currently using wheelchair and lateral transfer board, gait belt. Estimated LOS: 01/14/20 Activity Tolerance:  
Fair, pt c/o increased pain, SOB, and needs frequent rest breaks, uses 3L of O2 via nasal cannula during session. Please refer to the flowsheet for vital signs taken during this treatment. After treatment: Patient left sitting up in w/c and handed off to OT for continuation of therapy. Mary Adler, PT 
12/26/2019

## 2019-12-26 NOTE — PROGRESS NOTES
SHIFT CHANGE NOTE FOR [facility] Bedside and Verbal shift change report given to Main Dove (oncoming nurse) by Zeke Rasmussen, RN (offgoing nurse). Report included the following information SBAR, Kardex, MAR and Recent Results. Situation: 
 Code Status: Full Code Reason for Admission: Spinal Thoracic laminectomy T 6-T 11 Hospital Day: 9 Problem List:  
Hospital Problems  Date Reviewed: 12/25/2019 Codes Class Noted POA Chronic respiratory failure with hypoxia (HCC) (Chronic) ICD-10-CM: J96.11 
ICD-9-CM: 518.83, 799.02  Unknown Yes Overview Signed 12/16/2019 10:11 PM by Mara De Los Santos MD  
  On home oxygen inhalation at 3 LPM via [place] Opioid dependence (HCC) (Chronic) ICD-10-CM: V70.97 ICD-9-CM: 304.00  Unknown Yes Overview Signed 12/16/2019 10:54 PM by Mara De Los Santos MD  
  On Methadone Acute blood loss as cause of postoperative anemia ICD-10-CM: D62 
ICD-9-CM: 285.1  12/12/2019 Yes Impaired mobility and ADLs ICD-10-CM: Z74.09 
ICD-9-CM: 799.89  12/11/2019 Yes Spinal cord compression (HCC) ICD-10-CM: G95.20 ICD-9-CM: 336.9  12/11/2019 Yes Compression fracture of body of thoracic vertebra (HCC) ICD-10-CM: S22.000A ICD-9-CM: 805.2  12/11/2019 Yes * (Principal) Status post laminectomy with spinal fusion ICD-10-CM: Z98.1 ICD-9-CM: V45.4  12/11/2019 Yes Overview Signed 12/16/2019 10:05 PM by Mara De Los Santos MD  
  S/P T10, T9, T8 laminectomy; T7, T8, T9, T10, T11, T12 posterior thoracic fusion; segmental spinal instrumentation; K2M [place] type, T7-T8, T11-T12 (12/11/2019 - Dr. Michelle Torres) History of fracture due to fall ICD-10-CM: Z87.81 ICD-9-CM: V15.51  12/11/2019 Yes Hypoxemia requiring supplemental oxygen (Chronic) ICD-10-CM: R09.02, Z99.81 ICD-9-CM: 799.02  12/29/2014 Yes Chronic obstructive pulmonary disease (COPD) (HCC) (Chronic) ICD-10-CM: J44.9 ICD-9-CM: 891  Unknown Yes Overview Signed 4/23/2013 10:01 AM by Christina CALLES  
  SOB, on [place] O2 Recent admission with psychosis Hypertensive heart disease without heart failure (Chronic) ICD-10-CM: I11.9 ICD-9-CM: 402.90  Unknown Yes Overview Signed 4/23/2013 10:02 AM by Nuris Cornejo Better controlled Type 2 diabetes mellitus with diabetic neuropathy, with long-term current use of insulin (HCC) (Chronic) ICD-10-CM: E11.40, Z79.4 ICD-9-CM: 250.60, 357.2, V58.67  Unknown Yes Overview Signed 12/18/2019  2:13 PM by Dee Pang MD  
  HbA1c (12/11/2019) = 7.1 Background: 
 Past Medical History:  
Past Medical History:  
Diagnosis Date  Abnormally low high density lipoprotein (HDL) cholesterol with hypertriglyceridemia Lipid profile (11/6/2016) showed , , HDL 38, LDL 37  Acute blood loss as cause of postoperative anemia 12/12/2019  Anxiety  Bipolar affective disorder ([address] 12/5/2012  Cellulitis of right forearm 05/04/2017  Chronic back pain  Chronic obstructive pulmonary disease (COPD) ([address] SOB, on [place] O2 Recent admission with psychosis  Chronic respiratory failure with hypoxia ([address] On home oxygen inhalation at 3 LPM via [place]  
 Contusion of left elbow 5/4/2017  Depression  Diabetic neuropathy associated with type 2 diabetes mellitus ([address]  Diastolic dysfunction without heart failure Stable on diuretics  Falls frequently  Gastroesophageal reflux disease with hiatal hernia  Generalized osteoarthritis of multiple sites  Gout On Allopurinol  History of acute renal failure 5/31/2013  History of back injury   
 jumped out of second story window  History of fracture due to fall 12/11/2019  
 History of hepatitis C   
 treated  History of penicillin allergy  History of vitamin D deficiency 5/10/2017  Hypertensive heart disease without heart failure Better controlled  Hyperuricemia 5/26/2017  Hypothyroidism  Hypoxemia requiring supplemental oxygen 12/29/2014  Intravenous drug user 5/2/2017  Long term current use of aspirin  Memory difficulty  Menopause  Mixed connective tissue disease ([facility] 5/10/2017  Obesity, Class I, BMI 30-34.9  Olecranon bursitis of right elbow 5/4/2017  Opioid dependence ([facility] On Methadone  Recurrent genital herpes 5/31/2013  Right Achilles tendinitis 5/2/2017  Sarcoidosis  Sickle cell trait ([facility]  Tobacco use disorder  Type 2 diabetes mellitus with diabetic neuropathy, with long-term current use of insulin ([facility] HbA1c (12/11/2019) = 7.1  Urge urinary incontinence 5/10/2017  Venous insufficiency  Wears glasses Patient taking anticoagulants no Patient has a defibrillator: no  
 
Assessment: 
? Changes in Assessment throughout shift: None ? Patient has central line: no Reasons if yes: Removed 12/16/19 Insertion date:n/a Last dressing date:n/a 
? Patient has Renae Cath: no Reasons if yes: n/a Insertion date:n/a 
 
? Last Vitals: 
  
Vitals:  
 12/24/19 0720 12/24/19 1802 12/24/19 2133 12/25/19 1609 BP: 148/89 113/67 126/69 96/60 Pulse: 80 76 84 78 Resp: 16 16 18 19 Temp: 98 °F (36.7 °C) 98.9 °F (37.2 °C) 98 °F (36.7 °C) 97.4 °F (36.3 °C) SpO2: 98% 98% 96% 95% Weight:      
LMP: 11/25/2012  
 
 
? PAIN Pain Assessment Pain Intensity 1: 0 (12/25/19 1650) Pain Intensity 1: 2 (12/29/14 1105) Pain Location 1: Back Pain Location 1: Abdomen Pain Intervention(s) 1: Medication (see MAR) Pain Intervention(s) 1: Medication (see MAR) Patient Stated Pain Goal: 0 Patient Stated Pain Goal: 0 
o Intervention effective: yes   
o Other actions taken for pain: no c/o 
 
? Skin Assessment Skin color Skin Color: Appropriate for ethnicity Condition/Temperature Skin Condition/Temp: Warm Integrity Skin Integrity: Incision (comment) Turgor Turgor: Non-tenting Weekly Pressure Ulcer Documentation  Pressure  Injury Documentation: No Pressure Injury Noted-Pressure Ulcer Prevention Initiated Wound Prevention & Protection Methods Orientation of wound Orientation of Wound Prevention: Posterior Location of Prevention Location of Wound Prevention: Sacrum/Coccyx Dressing Present Dressing Present : No 
Dressing Status Wound Offloading Wound Offloading (Prevention Methods): Bed, pressure redistribution/air ? INTAKE/OUPUT Date 12/24/19 1900 - 12/25/19 9740 12/25/19 0700 - 12/26/19 4634 Shift 7731-1727 24 Hour Total 3888-7385 6429-3111 24 Hour Total  
INTAKE  
P.O.   1800  1800  
  P. O.   1800  1800 Shift Total(mL/kg)   [id number])  [id number])  
OUTPUT Urine(mL/kg/hr) Urine Occurrence(s) 3 x 3 x 7 x  7 x Stool Stool Occurrence(s) 0 x 0 x 0 x  0 x Shift Total(mL/kg) NET   1800  1800 Weight (kg) 42.9 42.9 42.9 42.9 42.9 Recommendations: 1. Patient needs and requests: met 2. Diet: Cardiac DM Reg Lesta [place]avan liq 3. Pending tests/procedures: none 4. Functional Level/Equipment: wheelchair 5. Estimated Discharge Date: TBD Posted on Whiteboard in Patients Room: yes HEALS Safety Check A safety check occurred in the patient's room between off going nurse and oncoming nurse listed above. The safety check included the below items Area Items H High Alert Medications ? Verify all high alert medication drips (heparin, PCA, etc.) E Equipment ? Suction is set up for ALL patients (with yanker) ? Red plugs utilized for all equipment (IV pumps, etc.) ? WOWs wiped down at end of shift. ? Room stocked with oxygen, suction, and other unit-specific supplies A Alarms ? Bed alarm is set for fall risk patients ? Ensure chair alarm is in place and activated if patient is up in a chair L Lines ? Check IV for any infiltration ? Renae bag is empty if patient has a Renae ? Tubing and IV bags are labeled [employer] Safety ? Room is clean, patient is clean, and equipment is clean. ? Hallways are clear from equipment besides carts. ? Fall bracelet on for fall risk patients ? Ensure room is clear and free of clutter ? Suction is set up for ALL patients (with lashay) ? Hallways are clear from equipment besides carts. ? Isolation precautions followed, supplies available outside room, sign posted

## 2019-12-26 NOTE — PROGRESS NOTES
Problem: Self Care Deficits Care Plan (Adult) Goal: *Therapy Goal (Edit Goal, Insert Text) Description Occupational Therapy Goals Long Term Goals Initiated 19 and to be accomplished within 4 week(s)  to facilitate increased self care independence and strength for return to PLOF and decreased care giver burden: 2. Pt will perform grooming with Mod I.  
3. Pt will perform UB bathing with SBA. 4. Pt will perform LB bathing with Tyree. 5. Pt will perform tub/shower transfer <> TTB with Min A.  
6. Pt will perform UB dressing with Set-up. 7. Pt will perform LB dressing with Min A. 
8. Pt will perform toileting task with Min A. 
9. Pt will perform toilet transfer with Min A/CGA. Short Term Goals Initiated 19 and to be accomplished within 7 day(s) (19) to facilitate increased self care independence and strength for return to PLOF and decreased care giver burden: 1. Pt will perform self-feeding with Mod I.  
2. Pt will perform grooming with set-up. (GM) 3. Pt will perform UB bathing with Mod A. (GM) 4. Pt will perform LB bathing with Mod A in LRE. (GM) 5. Pt will perform tub/shower transfer <> TTB with MaxA x 1. 
6. Pt will perform UB dressing with SBA. 7. Pt will perform LB dressing with Mod A using AE as needed. (GM) 8. Pt will perform toileting task with Max A x 1. (GM) 9. Pt will perform toilet transfer with MaxA x 1. Outcome Measures: 
(19) Dynamometer  strength: Right= 27.33# (norm=55. 1#) Left= 34.67# (norm=45.7#) (19) 9-hole peg test: Right=45.46 seconds ; Left=41.13 seconds Outcome: Progressing Towards Goal 
 OCCUPATIONAL THERAPY TREATMENT Patient: Modesto Maldonado 62 y.o. Patient identified with name and : Yes  
 
Date: 2019 First Tx Session Time In: 0673 Time Out[de-identified] 1178 Second Tx Session Time In: 1400 Time Out[de-identified] 1500 Diagnosis: Status post laminectomy with spinal fusion [Z98.1] Precautions: Chart, occupational therapy assessment, plan of care, and goals were reviewed. Pain: 
Pt reports 8/10 pain or discomfort prior to treatment. Pt reports 8/10 pain or discomfort post treatment. Intervention Provided: Medication provided at start of first session Second session, pt did not complain of pain SUBJECTIVE:  
Patient stated My legs don't open up I told you, do it for me baby.  OBJECTIVE DATA SUMMARY:  
 
THERAPEUTIC ACTIVITY Daily Assessment Endurance training with increasing functional reach/decreasing tremors  Seated in w/c with B LEs on floor, pt participated in functional reaching to play SocialDefender card game. With 1# weights added to B wrists, pt able to participate reporting less tremors in B hands. IADL Daily Assessment Laundry task  Seated in w/c, pt given wet clothing as individual pieces and was required to straighten them prior to placing in dryer to her right. End of session, pt utilized reacher to remove laundry from dryer and fold her in her room TOILETING Daily Assessment Performed x2 throughout session; second session, provided pt with bedpan for timed toileting-pt unable to void given extended time on bedpan  Toileting Toileting Assistance (FIM Score): 1 (Total assistance) Cues: Physical assistance for pants down;Physical assistance for pants up(in supine/sidelying ) Adaptive Equipment: Other (comment)(bedpan ) MOBILITY/TRANSFERS Daily Assessment Completed x2 today; first and second session Second session to provide pt opportunity on bedpan for timed toileting  Functional Transfers Toilet Transfer : Lateral with transfer board (w/c<>EOB; pt incontinent ) Amount of Assistance Required: 2 (Maximal assistance) ASSESSMENT: 
Pt continues to require additional assistance for incontinence and timed toileting with transfers to/from bed for bedpan use. She benefits from progressing to toilet transfers in prep for d/c home with assistance. Progression toward goals: 
[x]          Improving appropriately and progressing toward goals 
[]          Improving slowly and progressing toward goals 
[]          Not making progress toward goals and plan of care will be adjusted PLAN: 
Patient continues to benefit from skilled intervention to address the above impairments. Continue treatment per established plan of care. Discharge Recommendations:  Home Health with 24/7 Assistance Further Equipment Recommendations for Discharge:  Drop arm 3:1 commode Activity Tolerance: 
Fair; mod rest breaks Estimated LOS: 1/14/2020 Please refer to the flowsheet for vital signs taken during this treatment. After treatment:  
[x]  Patient left in no apparent distress sitting up in chair  
[]  Patient left in no apparent distress in bed 
[x]  Call bell left within reach 
[]  Nursing notified 
[]  Caregiver present 
[]  Bed alarm activated COMMUNICATION/EDUCATION:  
[x] Home safety education was provided and the patient/caregiver indicated understanding. [x] Patient/family have participated as able in goal setting and plan of care. [x] Patient/family agree to work toward stated goals and plan of care. [] Patient understands intent and goals of therapy, but is neutral about his/her participation. [] Patient is unable to participate in goal setting and plan of care.  
 
 
SABINE Pedroza/JANNET

## 2019-12-26 NOTE — PROGRESS NOTES
SHIFT CHANGE NOTE FOR [facility] Bedside and Verbal shift change report given to [address] [place] [address] (oncoming nurse) by Leidy Fatima RN (offgoing nurse). Report included the following information SBAR, Kardex, MAR and Recent Results. Situation: 
 Code Status: Full Code Reason for Admission: Spinal Thoracic laminectomy T 6-T 11 Hospital Day: 10 Problem List:  
Hospital Problems  Date Reviewed: 12/25/2019 Codes Class Noted POA Chronic respiratory failure with hypoxia (HCC) (Chronic) ICD-10-CM: J96.11 
ICD-9-CM: 518.83, 799.02  Unknown Yes Overview Signed 12/16/2019 10:11 PM by Blas Zee MD  
  On home oxygen inhalation at 3 LPM via [place] Opioid dependence (HCC) (Chronic) ICD-10-CM: D91.20 ICD-9-CM: 304.00  Unknown Yes Overview Signed 12/16/2019 10:54 PM by Blas Zee MD  
  On Methadone Acute blood loss as cause of postoperative anemia ICD-10-CM: D62 
ICD-9-CM: 285.1  12/12/2019 Yes Impaired mobility and ADLs ICD-10-CM: Z74.09 
ICD-9-CM: 799.89  12/11/2019 Yes Spinal cord compression (HCC) ICD-10-CM: G95.20 ICD-9-CM: 336.9  12/11/2019 Yes Compression fracture of body of thoracic vertebra (HCC) ICD-10-CM: S22.000A ICD-9-CM: 805.2  12/11/2019 Yes * (Principal) Status post laminectomy with spinal fusion ICD-10-CM: Z98.1 ICD-9-CM: V45.4  12/11/2019 Yes Overview Signed 12/16/2019 10:05 PM by Blas Zee MD  
  S/P T10, T9, T8 laminectomy; T7, T8, T9, T10, T11, T12 posterior thoracic fusion; segmental spinal instrumentation; K2M [place] type, T7-T8, T11-T12 (12/11/2019 - Dr. Vinny Penn) History of fracture due to fall ICD-10-CM: Z87.81 ICD-9-CM: V15.51  12/11/2019 Yes Hypoxemia requiring supplemental oxygen (Chronic) ICD-10-CM: R09.02, Z99.81 ICD-9-CM: 799.02  12/29/2014 Yes Chronic obstructive pulmonary disease (COPD) (HCC) (Chronic) ICD-10-CM: J44.9 ICD-9-CM: 655  Unknown Yes Overview Signed 4/23/2013 10:01 AM by Jose CALLES  
  SOB, on [place] O2 Recent admission with psychosis Hypertensive heart disease without heart failure (Chronic) ICD-10-CM: I11.9 ICD-9-CM: 402.90  Unknown Yes Overview Signed 4/23/2013 10:02 AM by Alexander Harden Better controlled Type 2 diabetes mellitus with diabetic neuropathy, with long-term current use of insulin (HCC) (Chronic) ICD-10-CM: E11.40, Z79.4 ICD-9-CM: 250.60, 357.2, V58.67  Unknown Yes Overview Signed 12/18/2019  2:13 PM by Isaac Beverly MD  
  HbA1c (12/11/2019) = 7.1 Background: 
 Past Medical History:  
Past Medical History:  
Diagnosis Date  Abnormally low high density lipoprotein (HDL) cholesterol with hypertriglyceridemia Lipid profile (11/6/2016) showed , , HDL 38, LDL 37  Acute blood loss as cause of postoperative anemia 12/12/2019  Anxiety  Bipolar affective disorder ([address] 12/5/2012  Cellulitis of right forearm 05/04/2017  Chronic back pain  Chronic obstructive pulmonary disease (COPD) ([address] SOB, on [place] O2 Recent admission with psychosis  Chronic respiratory failure with hypoxia ([address] On home oxygen inhalation at 3 LPM via [place]  
 Contusion of left elbow 5/4/2017  Depression  Diabetic neuropathy associated with type 2 diabetes mellitus ([address]  Diastolic dysfunction without heart failure Stable on diuretics  Falls frequently  Gastroesophageal reflux disease with hiatal hernia  Generalized osteoarthritis of multiple sites  Gout On Allopurinol  History of acute renal failure 5/31/2013  History of back injury   
 jumped out of second story window  History of fracture due to fall 12/11/2019  
 History of hepatitis C   
 treated  History of penicillin allergy  History of vitamin D deficiency 5/10/2017  Hypertensive heart disease without heart failure Better controlled  Hyperuricemia 5/26/2017  Hypothyroidism  Hypoxemia requiring supplemental oxygen 12/29/2014  Intravenous drug user 5/2/2017  Long term current use of aspirin  Memory difficulty  Menopause  Mixed connective tissue disease ([facility] 5/10/2017  Obesity, Class I, BMI 30-34.9  Olecranon bursitis of right elbow 5/4/2017  Opioid dependence ([facility] On Methadone  Recurrent genital herpes 5/31/2013  Right Achilles tendinitis 5/2/2017  Sarcoidosis  Sickle cell trait ([facility]  Tobacco use disorder  Type 2 diabetes mellitus with diabetic neuropathy, with long-term current use of insulin ([facility] HbA1c (12/11/2019) = 7.1  Urge urinary incontinence 5/10/2017  Venous insufficiency  Wears glasses Patient taking anticoagulants no Patient has a defibrillator: no  
 
Assessment: 
? Changes in Assessment throughout shift: None ? Patient has central line: no Reasons if yes: Removed 12/16/19 Insertion date:n/a Last dressing date:n/a 
? Patient has Renae Cath: no Reasons if yes: n/a Insertion date:n/a 
 
? Last Vitals: 
  
Vitals:  
 12/25/19 1609 12/25/19 2225 12/26/19 0803 12/26/19 1552 BP: 96/60 124/70 140/81 103/63 Pulse: 78 77 74 82 Resp: 19 18 18 18 Temp: 97.4 °F (36.3 °C) 98 °F (36.7 °C) 98.9 °F (37.2 °C) 99.2 °F (37.3 °C) SpO2: 95% 99% 100% 99% Weight:      
LMP: 11/25/2012  
 
 
? PAIN Pain Assessment Pain Intensity 1: 0 (12/26/19 1604) Pain Intensity 1: 2 (12/29/14 1105) Pain Location 1: Back Pain Location 1: Abdomen Pain Intervention(s) 1: Medication (see MAR) Pain Intervention(s) 1: Medication (see MAR) Patient Stated Pain Goal: 0 Patient Stated Pain Goal: 0 
o Intervention effective: yes   
o Other actions taken for pain: no c/o 
 
? Skin Assessment Skin color Skin Color: Appropriate for ethnicity Condition/Temperature Skin Condition/Temp: Dry, Warm Integrity Skin Integrity: Incision (comment) Turgor Turgor: Non-tenting Weekly Pressure Ulcer Documentation  Pressure  Injury Documentation: No Pressure Injury Noted-Pressure Ulcer Prevention Initiated Wound Prevention & Protection Methods Orientation of wound Orientation of Wound Prevention: Posterior Location of Prevention Location of Wound Prevention: Sacrum/Coccyx Dressing Present Dressing Present : No 
Dressing Status Dressing Status: Intact Wound Offloading Wound Offloading (Prevention Methods): Bed, pressure reduction mattress ? INTAKE/OUPUT Date 12/25/19 0700 - 12/26/19 9590 12/26/19 0700 - 12/27/19 1894 Shift 0700-1859 1900-0659 24 Hour Total 0700-1859 1900-0659 24 Hour Total  
INTAKE  
P.O. 1800  1800 240  240  
  P. O. 1800  1800 240  240 Shift Total(mL/kg) 1800(42)  1800(42) 240(5.6)  240(5.6) OUTPUT Urine(mL/kg/hr) Urine Occurrence(s) 7 x 5 x 12 x 3 x  3 x Stool Stool Occurrence(s) 0 x 0 x 0 x 0 x  0 x Shift Total(mL/kg) NET 1800  1800 240  240 Weight (kg) 42.9 42.9 42.9 42.9 42.9 42.9 Recommendations: 1. Patient needs and requests: met 2. Diet: Cardiac DM Reg [place] liq 3. Pending tests/procedures: none 4. Functional Level/Equipment: wheelchair 5. Estimated Discharge Date: TBD Posted on Whiteboard in Patients Room: yes HEALS Safety Check A safety check occurred in the patient's room between off going nurse and oncoming nurse listed above. The safety check included the below items Area Items H High Alert Medications ? Verify all high alert medication drips (heparin, PCA, etc.) E Equipment ? Suction is set up for ALL patients (with yanker) ? Red plugs utilized for all equipment (IV pumps, etc.) ? WOWs wiped down at end of shift. ? Room stocked with oxygen, suction, and other unit-specific supplies A Alarms ? Bed alarm is set for fall risk patients ? Ensure chair alarm is in place and activated if patient is up in a chair L Lines ? Check IV for any infiltration ? Renae bag is empty if patient has a Renae ? Tubing and IV bags are labeled [employer] Safety ? Room is clean, patient is clean, and equipment is clean. ? Hallways are clear from equipment besides carts. ? Fall bracelet on for fall risk patients ? Ensure room is clear and free of clutter ? Suction is set up for ALL patients (with lashay) ? Hallways are clear from equipment besides carts. ? Isolation precautions followed, supplies available outside room, sign posted

## 2019-12-26 NOTE — PROGRESS NOTES
[address] 
[address] [place] [address], [address] INPATIENT REHABILITATION 
DAILY PROGRESS NOTE Date: 12/26/2019 Name: Santos Hzd Age / Sex: 62 y.o. / female CSN: [id number] MRN: [id number] Admit Date: 12/16/2019 Length of Stay: 10 days Primary Rehab Diagnosis: Impaired Mobility and ADLs secondary to: 
1. S/P T10, T9, T8 laminectomy; T7, T8, T9, T10, T11, T12 posterior thoracic fusion; segmental spinal instrumentation; K2M Davis type, T7-T8, T11-T12 (12/11/2019 - Dr. Al Baez) 2. History of acute compression fractures of body of thoracic vertebrae (T9 and T10) due to fall Subjective:  
 
Patient sitting in a chair in NAD, awake, alert Objective:  
 
Vital Signs: 
Patient Vitals for the past 24 hrs: 
 BP Temp Pulse Resp SpO2  
12/26/19 1552 103/63 99.2 °F (37.3 °C) 82 18 99 % 12/26/19 0803 140/81 98.9 °F (37.2 °C) 74 18 100 % 12/25/19 2225 124/70 98 °F (36.7 °C) 77 18 99 % Physical Examination: 
General:  Awake, alert Cardiovascular:  S1S2+, RRR Pulmonary:  Coarse bs b/l GI:  Soft, BS+, NT, ND Extremities:  No edema Current Medications: 
Current Facility-Administered Medications Medication Dose Route Frequency  insulin glargine (LANTUS) injection 25 Units  25 Units SubCUTAneous ACB  insulin lispro (HUMALOG) injection 4 Units  4 Units SubCUTAneous TIDAC  lubiPROStone (AMITIZA) capsule 24 mcg  24 mcg Oral DAILY WITH BREAKFAST  guaiFENesin ER (MUCINEX) tablet 600 mg  600 mg Oral Q12H  
 acetaminophen (TYLENOL) tablet 650 mg  650 mg Oral Q4H PRN  
 bisacodyl (DULCOLAX) tablet 10 mg  10 mg Oral Q48H PRN  
 ferrous sulfate tablet 325 mg  1 Tab Oral DAILY WITH BREAKFAST  ascorbic acid (vitamin C) (VITAMIN C) tablet 250 mg  250 mg Oral DAILY WITH BREAKFAST  insulin lispro (HUMALOG) injection   SubCUTAneous TIDAC  senna-docusate (PERICOLACE) 8.6-50 mg per tablet 2 Tab  2 Tab Oral PCD  
  methadone (DOLOPHINE) tablet 20 mg  20 mg Oral DAILY  oxyCODONE-acetaminophen (PERCOCET 10)  mg per tablet 1 Tab  1 Tab Oral Q4H PRN  predniSONE (DELTASONE) tablet 30 mg  30 mg Oral DAILY WITH BREAKFAST  famotidine (PEPCID) tablet 20 mg  20 mg Oral BID  rosuvastatin (CRESTOR) tablet 5 mg  5 mg Oral QHS  albuterol (PROVENTIL VENTOLIN) nebulizer solution 2.5 mg  2.5 mg Nebulization Q4H PRN  
 levothyroxine (SYNTHROID) tablet 75 mcg  75 mcg Oral 6am  
 cholecalciferol (VITAMIN D3) (400 Units /10 mcg) tablet 5 Tab  2,000 Units Oral DAILY  calcium citrate tablet 200 mg [elemental]  200 mg Oral BID  divalproex DR (DEPAKOTE) tablet 500 mg  500 mg Oral QHS  fluticasone-vilanterol (BREO ELLIPTA) 100mcg-25mcg/puff  1 Puff Inhalation DAILY  oxybutynin (DITROPAN) tablet 5 mg  5 mg Oral BID  allopurinol (ZYLOPRIM) tablet 100 mg  100 mg Oral DAILY  sertraline (ZOLOFT) tablet 50 mg  50 mg Oral DAILY  methocarbamol (ROBAXIN) tablet 500 mg  500 mg Oral Q8H  
 aspirin chewable tablet 81 mg  81 mg Oral DAILY WITH BREAKFAST  docusate sodium (COLACE) capsule 100 mg  100 mg Oral DAILY AFTER BREAKFAST Allergies: Allergies Allergen Reactions  Moxifloxacin Rash  Pcn [Penicillins] Swelling  Sulfa (Sulfonamide Antibiotics) Swelling Functional Progress: PHYSICAL THERAPY 
 
ON ADMISSION MOST RECENT Wheelchair Mobility/Management Able to Propel (ft): 140 feet(max A for steering) Functional Level: 2 Curbs/Ramps Assist Required (FIM Score): 0 (Not tested) Wheelchair Setup Assist Required : 2 (Maximal assistance) Wheelchair Management: Manages left brake(needing assist with brakes) Wheelchair Mobility/Management Able to Propel (ft): 150 feet Functional Level: 4 Curbs/Ramps Assist Required (FIM Score): 0 (Not tested) Wheelchair Setup Assist Required : 3 (Moderate assistance) Wheelchair Management: Manages left brake, Manages right brake(with extender) Gait Amount of Assistance: 0 (Not tested) Distance (ft): 0 Feet (ft) Assistive Device: (N/A) Gait Amount of Assistance: 0 (Not tested) Distance (ft): 0 Feet (ft) Assistive Device: (N/A) Balance-Sitting/Standing Sitting - Static: Fair (occasional) Sitting - Dynamic: Fair (occasional), Occassional, Poor (constant support) Standing - Static: Poor, Constant support Standing - Dynamic : (not tested) Balance-Sitting/Standing Sitting - Static: Fair (occasional) Sitting - Dynamic: Fair (occasional)(-) Standing - Static: Poor, Constant support Standing - Dynamic : Impaired Bed/Mat Mobility Rolling Right : 2 (Maximal assistance) Rolling Left : 2 (Maximal assistance) Supine to Sit : 2 (Maximal assistance) Sit to Supine : 2 (Maximal assistance) Bed/Mat Mobility Rolling Right : 3 (Moderate assistance ) Rolling Left : 3 (Moderate assistance ) Supine to Sit : 2 (Maximal assistance)(max A to initiate, mod A to follow through) Sit to Supine : 2 (Maximal assistance)(for LE mgm't and roll onto back to maintain spinal prec) Transfers Transfer Type: Lateral with transfer board Transfer Assistance : 1 (Total assistance)(Max A x 2 for safety, sequencing, appropriate ant trunk ) Sit to Stand Assistance: Total assistance Car Transfers: Not tested Car Type: N/A Transfers Transfer Type: Lateral with transfer board Transfer Assistance : 3 (Moderate assistance )(after set-up of w/c and board) Sit to Stand Assistance: Unable at this time Car Transfers: Not tested Car Type: n/a Steps or Stairs Steps/Stairs Ambulated (#): 0 Level of Assist : 0 (Not tested) Rail Use: (N/A) Steps or Stairs Steps/Stairs Ambulated (#): 0 Level of Assist : 0 (Not tested) Rail Use: (N/A) Lab/Data Review: 
Recent Results (from the past 24 hour(s)) GLUCOSE, POC Collection Time: 12/25/19  8:11 PM  
Result Value Ref Range Glucose (POC) 271 (H) 70 - 110 mg/dL GLUCOSE, POC  
 Collection Time: 12/26/19  7:24 AM  
Result Value Ref Range Glucose (POC) 89 70 - 110 mg/dL GLUCOSE, POC Collection Time: 12/26/19 11:29 AM  
Result Value Ref Range Glucose (POC) 129 (H) 70 - 110 mg/dL GLUCOSE, POC Collection Time: 12/26/19  4:55 PM  
Result Value Ref Range Glucose (POC) 312 (H) 70 - 110 mg/dL Assessment:  
 
Primary Rehabilitation Diagnosis 1. Impaired Mobility and ADLs 2. S/P T10, T9, T8 laminectomy; T7, T8, T9, T10, T11, T12 posterior thoracic fusion; segmental spinal instrumentation; K2M Holden type, T7-T8, T11-T12 (12/11/2019 - Dr. Jennifer Arora) 3. History of acute compression fractures of body of thoracic vertebrae (T9 and T10) due to fall 
  
Comorbidities  Diabetic neuropathy associated with type 2 diabetes mellitus (HCC) E11.40  Venous insufficiency I87.2  Obesity, Class I, BMI 30-34.9 E66.9  Chronic back pain M54.9, G89.29  
 Generalized osteoarthritis of multiple sites M15.9  Bipolar affective disorder  F31.9  Abnormally low high density lipoprotein (HDL) cholesterol with hypertriglyceridemia E78.6, E78.1  Diastolic dysfunction without heart failure I51.89  Chronic obstructive pulmonary disease (COPD)  J44.9  Hypertensive heart disease without heart failure I11.9  Depression F32.9  History of back injury Z87.828  Type 2 diabetes mellitus with diabetic neuropathy, with long-term current use of insulin  E11.40, Z79.4  Anxiety F41.9  Wears glasses Z97.3  History of hepatitis C Z86.19  
 Sickle cell trait D57.3  History of acute renal failure Z87.448  Hypothyroidism E03.9  Recurrent genital herpes A60.00  Sarcoidosis D86.9  Abscess of right arm L02.413  Hypoxemia requiring supplemental oxygen R09.02, Z99.81  
 Abnormal nuclear stress test R94.39  
 Cellulitis and abscess of hand L03.119, L02.519  
 Cellulitis and abscess of foot L03.119, L02.619  
 Cellulitis of arm, right L03. [facility]  
  Olecranon bursitis of right elbow M70.21  
 Contusion of left elbow S50. 02XA  Intravenous drug user F19.90  Right Achilles tendinitis M76.61  
 History of penicillin allergy Z88.0  Falls frequently R29.6  Gastroesophageal reflux disease with hiatal hernia K21.9, K44.9  Memory difficulty R41.3  Mixed connective tissue disease  M35.1  Hyperuricemia E79.0  History of vitamin D deficiency Z86.39  
 Urge urinary incontinence N39.41  
 Acute blood loss as cause of postoperative anemia D62  
 History of fracture due to fall Z87.81  Chronic respiratory failure with hypoxia  J96.11  
 Cellulitis of right forearm L03. 113  
 Gout M10.9  Menopause Z78.0  Long term current use of aspirin Z79.82  
 Opioid dependence  F11.20  Tobacco use disorder F17.200 Plan: 1. Justification for continued stay: Good progression towards established rehabilitation goals. 2. Medical Issues being followed closely: 
  [x]  Fall and safety precautions  
  []  Wound Care  
  [x]  Bowel and Bladder Function  
  [x]  Fluid Electrolyte and Nutrition Balance  
  []  Pain Control 3. Issues that 24 hour rehabilitation nursing is following: 
  [x]  Fall and safety precautions  
  []  Wound Care  
  [x]  Bowel and Bladder Function  
  [x]  Fluid Electrolyte and Nutrition Balance  
  []  Pain Control   
  [x]  Assistance with and education on in-room safety with transfers to and from the bed, wheelchair, toilet and shower. 4. Acute rehabilitation plan of care: 
  [x]  Continue current care and rehab. [x]  Physical Therapy [x]  Occupational Therapy  
        []  Speech Therapy  
  []  Hold Rehab until further notice 5. Medications: 
  [x]  MAR Reviewed [x]  Continue Present Medications 6. DVT Prophylaxis:   
  []  Lovenox  
  []  Unfractionated Heparin  
  []  Coumadin  
  []  NOAC [x]  ROBERT Stockings  
  []  Sequential Compression Device []  None 7. Orders:  
> S/P T10, T9, T8 laminectomy; T7, T8, T9, T10, T11, T12 posterior thoracic fusion; segmental spinal instrumentation; K2M Davis type, T7-T8, T11-T12 (12/11/2019 - Dr. Savi Novak); History of acute compression fractures of body of thoracic vertebrae (T9 and T10) due to fall 
 > Thoracic spinal precautions 
 > Continue: 
  > Calcium citrate 200 mg PO BID 
  > Cholecalciferol 2,000 units PO once daily 
 
> Acute Postoperative Blood Loss Anemia 
 > ferrous sulfate, vit C 
 
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 
 > Prior to admission, the patient stated she was on Metolazone 2.5 mg PO q Mon-Wed-Fri 
 > Metolazone was not given during the patient's hospital stay at [facility]  
 > Upon admission to the ARU, held Metolazone  
 
> Chronic obstructive pulmonary disease; Chronic respiratory failure with hypoxemia requiring supplemental oxygen (3 LPM) > Continue: 
  > breo ellipta, O2 
 
> Opioid dependence 
 > Continue Methadone 20 mg PO once daily 
 
> Type 2 diabetes mellitus with stage 3 chronic kidney disease 
 > HbA1c (12/11/2019) = 7.1 
 > lantus, premeal, SSI 
 
 
> Constipation 
 >on bowel regimen 
 
> Analgesia 
 > Continue: 
  > Acetaminophen 650 mg PO q 4 hr PRN for pain level less than 5/10 
  > Methocarbamol 500 mg PO q 8 hr 
  > Percocet 10/325 1 tab PO q 4 hr PRN for pain level greater than 4/10  
 
> Diet: 
 > Specifications: Cardiac, diabetic consistent carb 1800 kcal, no concentrated sweets, low purine 
 > Solids (consistency): Regular  
 > Liquids (consistency): Thin D/w patient Signed:     
  December 26, 2019

## 2019-12-26 NOTE — PROGRESS NOTES
Problem: Diabetes Self-Management Goal: *Disease process and treatment process Description Define diabetes and identify own type of diabetes; list 3 options for treating diabetes. Outcome: Progressing Towards Goal 
Goal: *Incorporating nutritional management into lifestyle Description Describe effect of type, amount and timing of food on blood glucose; list 3 methods for planning meals. Outcome: Progressing Towards Goal 
Goal: *Incorporating physical activity into lifestyle Description State effect of exercise on blood glucose levels. Outcome: Progressing Towards Goal 
Goal: *Developing strategies to promote health/change behavior Description Define the ABC's of diabetes; identify appropriate screenings, schedule and personal plan for screenings. Outcome: Progressing Towards Goal 
Goal: *Using medications safely Description State effect of diabetes medications on diabetes; name diabetes medication taking, action and side effects. Outcome: Progressing Towards Goal 
Goal: *Monitoring blood glucose, interpreting and using results Description Identify recommended blood glucose targets  and personal targets. Outcome: Progressing Towards Goal 
Goal: *Prevention, detection, treatment of acute complications Description List symptoms of hyper- and hypoglycemia; describe how to treat low blood sugar and actions for lowering  high blood glucose level. Outcome: Progressing Towards Goal 
Goal: *Prevention, detection and treatment of chronic complications Description Define the natural course of diabetes and describe the relationship of blood glucose levels to long term complications of diabetes. Outcome: Progressing Towards Goal 
Goal: *Developing strategies to address psychosocial issues Description Describe feelings about living with diabetes; identify support needed and support network Outcome: Progressing Towards Goal 
Goal: *Insulin pump training Outcome: Progressing Towards Goal 
 Goal: *Sick day guidelines Outcome: Progressing Towards Goal 
Goal: *Patient Specific Goal (EDIT GOAL, INSERT TEXT) Outcome: Progressing Towards Goal 
  
Problem: Patient Education: Go to Patient Education Activity Goal: Patient/Family Education Outcome: Progressing Towards Goal 
  
Problem: Falls - Risk of 
Goal: *Absence of Falls Description Document Lima Ellis Fall Risk and appropriate interventions in the flowsheet. Outcome: Progressing Towards Goal 
Note: Fall Risk Interventions: 
Mobility Interventions: Bed/chair exit alarm Medication Interventions: Bed/chair exit alarm Elimination Interventions: Bed/chair exit alarm History of Falls Interventions: Bed/chair exit alarm Problem: Patient Education: Go to Patient Education Activity Goal: Patient/Family Education Outcome: Progressing Towards Goal 
  
Problem: Pressure Injury - Risk of 
Goal: *Prevention of pressure injury Description Document Florian Scale and appropriate interventions in the flowsheet. Outcome: Progressing Towards Goal 
Note: Pressure Injury Interventions: 
Sensory Interventions: Assess changes in LOC Moisture Interventions: Absorbent underpads Activity Interventions: Increase time out of bed, Chair cushion Mobility Interventions: Chair cushion, HOB 30 degrees or less, Pressure redistribution bed/mattress (bed type) Nutrition Interventions: Document food/fluid/supplement intake Friction and Shear Interventions: Foam dressings/transparent film/skin sealants, HOB 30 degrees or less Problem: Patient Education: Go to Patient Education Activity Goal: Patient/Family Education Outcome: Progressing Towards Goal 
  
Problem: Infection - Risk of, Surgical Site Infection Goal: *Absence of surgical site infection signs and symptoms Outcome: Progressing Towards Goal 
  
Problem: Patient Education: Go to Patient Education Activity Goal: Patient/Family Education Outcome: Progressing Towards Goal 
 Problem: Pain Goal: *Control of Pain Outcome: Progressing Towards Goal 
Goal: *PALLIATIVE CARE:  Alleviation of Pain Outcome: Progressing Towards Goal 
  
Problem: Patient Education: Go to Patient Education Activity Goal: Patient/Family Education Outcome: Progressing Towards Goal 
  
Problem: Nutrition Deficit Goal: *Optimize nutritional status Outcome: Progressing Towards Goal 
  
Problem: Patient Education: Go to Patient Education Activity Goal: Patient/Family Education Outcome: Progressing Towards Goal 
  
Problem: Patient Education: Go to Patient Education Activity Goal: Patient/Family Education Outcome: Progressing Towards Goal 
  
Problem: Injury - Risk of, Adverse Drug Event Goal: *Absence of adverse drug events Outcome: Progressing Towards Goal 
Goal: *Absence of medication errors Outcome: Progressing Towards Goal 
Goal: *Knowledge of prescribed medications Outcome: Progressing Towards Goal 
  
Problem: Patient Education: Go to Patient Education Activity Goal: Patient/Family Education Outcome: Progressing Towards Goal 
  
Problem: Inpatient Rehab (Adult) Goal: *LTG: Avoids injury/falls 100% of time related to deficits Outcome: Progressing Towards Goal 
Goal: *LTG: Avoids infection 100% of time related to deficits Outcome: Progressing Towards Goal 
Goal: *LTG: Verbalize understanding of diagnosis and risk factors for recurring stroke Outcome: Progressing Towards Goal 
Goal: *LTG: Absence of DVT during hospitalization Outcome: Progressing Towards Goal 
Goal: *LTG: Maintains Skin Integrity With No Evidence of Pressure Injury 100% of Time Outcome: Progressing Towards Goal 
Goal: Interventions Outcome: Progressing Towards Goal 
  
Problem: Patient Education: Go to Patient Education Activity Goal: Patient/Family Education Outcome: Progressing Towards Goal

## 2019-12-26 NOTE — PROGRESS NOTES
SHIFT CHANGE NOTE FOR Leeroy Sicard Bedside and Verbal shift change report given to Tramaine Carter (oncoming nurse) by Evelyn Rae RN (offgoing nurse). Report included the following information SBAR, Kardex, MAR and Recent Results. Situation: 
 Code Status: Full Code Reason for Admission: Spinal Thoracic laminectomy T 6-T 11 Hospital Day: 10 Problem List:  
Hospital Problems  Date Reviewed: 12/25/2019 Codes Class Noted POA Chronic respiratory failure with hypoxia (HCC) (Chronic) ICD-10-CM: J96.11 
ICD-9-CM: 518.83, 799.02  Unknown Yes Overview Signed 12/16/2019 10:11 PM by Elby Rinne, MD  
  On home oxygen inhalation at 3 LPM via NC Opioid dependence (HCC) (Chronic) ICD-10-CM: H57.10 ICD-9-CM: 304.00  Unknown Yes Overview Signed 12/16/2019 10:54 PM by Elby Rinne, MD  
  On Methadone Acute blood loss as cause of postoperative anemia ICD-10-CM: D62 
ICD-9-CM: 285.1  12/12/2019 Yes Impaired mobility and ADLs ICD-10-CM: Z74.09 
ICD-9-CM: 799.89  12/11/2019 Yes Spinal cord compression (HCC) ICD-10-CM: G95.20 ICD-9-CM: 336.9  12/11/2019 Yes Compression fracture of body of thoracic vertebra (HCC) ICD-10-CM: S22.000A ICD-9-CM: 805.2  12/11/2019 Yes * (Principal) Status post laminectomy with spinal fusion ICD-10-CM: Z98.1 ICD-9-CM: V45.4  12/11/2019 Yes Overview Signed 12/16/2019 10:05 PM by Elby Rinne, MD  
  S/P T10, T9, T8 laminectomy; T7, T8, T9, T10, T11, T12 posterior thoracic fusion; segmental spinal instrumentation; K2M Davis type, T7-T8, T11-T12 (12/11/2019 - Dr. Dacia Maguire) History of fracture due to fall ICD-10-CM: Z87.81 ICD-9-CM: V15.51  12/11/2019 Yes Hypoxemia requiring supplemental oxygen (Chronic) ICD-10-CM: R09.02, Z99.81 ICD-9-CM: 799.02  12/29/2014 Yes Chronic obstructive pulmonary disease (COPD) (HCC) (Chronic) ICD-10-CM: J44.9 ICD-9-CM: 317  Unknown Yes Overview Signed 4/23/2013 10:01 AM by Jose CALLES  
  SOB, on paula O2 Recent admission with psychosis Hypertensive heart disease without heart failure (Chronic) ICD-10-CM: I11.9 ICD-9-CM: 402.90  Unknown Yes Overview Signed 4/23/2013 10:02 AM by Alexander Harden Better controlled Type 2 diabetes mellitus with diabetic neuropathy, with long-term current use of insulin (HCC) (Chronic) ICD-10-CM: E11.40, Z79.4 ICD-9-CM: 250.60, 357.2, V58.67  Unknown Yes Overview Signed 12/18/2019  2:13 PM by Isaac Beverly MD  
  HbA1c (12/11/2019) = 7.1 Background: 
 Past Medical History:  
Past Medical History:  
Diagnosis Date  Abnormally low high density lipoprotein (HDL) cholesterol with hypertriglyceridemia Lipid profile (11/6/2016) showed , , HDL 38, LDL 37  Acute blood loss as cause of postoperative anemia 12/12/2019  Anxiety  Bipolar affective disorder ([address] 12/5/2012  Cellulitis of right forearm 05/04/2017  Chronic back pain  Chronic obstructive pulmonary disease (COPD) ([address] SOB, on paula O2 Recent admission with psychosis  Chronic respiratory failure with hypoxia ([address] On home oxygen inhalation at 3 LPM via [place]  
 Contusion of left elbow 5/4/2017  Depression  Diabetic neuropathy associated with type 2 diabetes mellitus ([address]  Diastolic dysfunction without heart failure Stable on diuretics  Falls frequently  Gastroesophageal reflux disease with hiatal hernia  Generalized osteoarthritis of multiple sites  Gout On Allopurinol  History of acute renal failure 5/31/2013  History of back injury   
 jumped out of second story window  History of fracture due to fall 12/11/2019  
 History of hepatitis C   
 treated  History of penicillin allergy  History of vitamin D deficiency 5/10/2017  Hypertensive heart disease without heart failure Better controlled  Hyperuricemia 5/26/2017  Hypothyroidism  Hypoxemia requiring supplemental oxygen 12/29/2014  Intravenous drug user 5/2/2017  Long term current use of aspirin  Memory difficulty  Menopause  Mixed connective tissue disease ([facility] 5/10/2017  Obesity, Class I, BMI 30-34.9  Olecranon bursitis of right elbow 5/4/2017  Opioid dependence ([facility] On Methadone  Recurrent genital herpes 5/31/2013  Right Achilles tendinitis 5/2/2017  Sarcoidosis  Sickle cell trait [facility]  Tobacco use disorder  Type 2 diabetes mellitus with diabetic neuropathy, with long-term current use of insulin ([facility] HbA1c (12/11/2019) = 7.1  Urge urinary incontinence 5/10/2017  Venous insufficiency  Wears glasses Patient taking anticoagulants no Patient has a defibrillator: no  
 
Assessment: 
? Changes in Assessment throughout shift: None ? Patient has central line: no Reasons if yes: Removed 12/16/19 Insertion date:n/a Last dressing date:n/a 
? Patient has Renae Cath: no Reasons if yes: n/a Insertion date:n/a 
 
? Last Vitals: 
  
Vitals:  
 12/24/19 1802 12/24/19 2133 12/25/19 1609 12/25/19 2225 BP: 113/67 126/69 96/60 124/70 Pulse: 76 84 78 77 Resp: 16 18 19 18 Temp: 98.9 °F (37.2 °C) 98 °F (36.7 °C) 97.4 °F (36.3 °C) 98 °F (36.7 °C) SpO2: 98% 96% 95% 99% Weight:      
LMP: 11/25/2012  
 
 
? PAIN Pain Assessment Pain Intensity 1: 0 (12/26/19 0412) Pain Intensity 1: 2 (12/29/14 1105) Pain Location 1: Back Pain Location 1: Abdomen Pain Intervention(s) 1: Medication (see MAR) Pain Intervention(s) 1: Medication (see MAR) Patient Stated Pain Goal: 0 Patient Stated Pain Goal: 0 
o Intervention effective: yes   
o Other actions taken for pain: no c/o 
 
? Skin Assessment Skin color Skin Color: Appropriate for ethnicity Condition/Temperature Skin Condition/Temp: Warm Integrity Skin Integrity: Incision (comment) Turgor Turgor: Non-tenting Weekly Pressure Ulcer Documentation  Pressure  Injury Documentation: No Pressure Injury Noted-Pressure Ulcer Prevention Initiated Wound Prevention & Protection Methods Orientation of wound Orientation of Wound Prevention: Posterior Location of Prevention Location of Wound Prevention: Sacrum/Coccyx Dressing Present Dressing Present : No 
Dressing Status Wound Offloading Wound Offloading (Prevention Methods): Bed, pressure redistribution/air ? INTAKE/OUPUT Date 12/25/19 0700 - 12/26/19 9169 12/26/19 0700 - 12/27/19 8729 Shift 0700-1859 1900-0659 24 Hour Total 0700-1859 6611-8587 24 Hour Total  
INTAKE  
P.O. 1800  1800     
  P. O. 1800  1800 Shift Total(mL/kg) [id number])  [id number])     
OUTPUT Urine(mL/kg/hr) Urine Occurrence(s) 7 x 3 x 10 x Stool Stool Occurrence(s) 0 x 0 x 0 x Shift Total(mL/kg) NET 1800  1800 Weight (kg) 42.9 42.9 42.9 42.9 42.9 42.9 Recommendations: 1. Patient needs and requests: met 2. Diet: Cardiac DM Reg Chapo Santosh liq 3. Pending tests/procedures: none 4. Functional Level/Equipment: wheelchair 5. Estimated Discharge Date: TBD Posted on Whiteboard in Patients Room: yes HEALS Safety Check A safety check occurred in the patient's room between off going nurse and oncoming nurse listed above. The safety check included the below items Area Items H High Alert Medications ? Verify all high alert medication drips (heparin, PCA, etc.) E Equipment ? Suction is set up for ALL patients (with yanker) ? Red plugs utilized for all equipment (IV pumps, etc.) ? WOWs wiped down at end of shift. ? Room stocked with oxygen, suction, and other unit-specific supplies A Alarms ? Bed alarm is set for fall risk patients ? Ensure chair alarm is in place and activated if patient is up in a chair L Lines ? Check IV for any infiltration ? Renae bag is empty if patient has a Renae ? Tubing and IV bags are labeled [facility] 
 Safety ? Room is clean, patient is clean, and equipment is clean. ? Hallways are clear from equipment besides carts. ? Fall bracelet on for fall risk patients ? Ensure room is clear and free of clutter ? Suction is set up for ALL patients (with lashay) ? Hallways are clear from equipment besides carts. ? Isolation precautions followed, supplies available outside room, sign posted

## 2019-12-26 NOTE — INTERDISCIPLINARY ROUNDS
[address] 
[address] [place] [address], [address] INPATIENT REHABILITATION 
PRE-TEAM CONFERENCE SUMMARY Date of Conference: 12/26/19 Patient Information:  
 
  
Name: Juli Guerra Age / Sex: 62 y.o. / female CSN: [id number] MRN: [id number] Admit Date: 12/16/2019 Length of Stay: 10 days Primary Rehabilitation Diagnosis: Impaired Mobility and ADLs secondary to Status post laminectomy with spinal fusion Therapy: FIM SCORES Initial Assessment Weekly Progress Assessment 12/26/2019 Eating Functional Level: 5 Comments: Pt requiring encouragement to demo container mgmt I'ly, requiring set-up over all. Pt able to demo utensil use I'ly  5 Swallowing Grooming 4  6 Bathing 2   UB: 5 LB: 3 Upper Body Dressing Functional Level: 3 Items Applied/Steps Completed: Pullover (4 steps) Comments: Pt requiring modA to [facility] gown and modA to shante t-shirt. Pt unable to pull down shirt anterior/posterior. 5 Lower Body Dressing Functional Level: 1 Items Applied/Steps Completed: Sock, left (1 step), Sock, right (1 step), Elastic waist pants (3 steps), Underpants (3 steps) Comments: Pt requiring total asst for compression stockings/sock mgmt with pt seated EOB. Pt rolling side:side with maxA with rehab tech present to asst with brief mgmt and donning pants. Pt attempting to bridge hips to shante pants over hips with pt unable to fully clear sacrum. 3 Moderate A Toileting Functional Level: 1 Comments: Pt incontinent of bladder/bowel, requiring total asst for hygiene and brief mgmt from bed level. 2 Max A-Total A based on incontinence Bladder  level of assist 1 1 Bladder  accident frequency score 1 1 Bowel  level of assist 1 1 Bowel  accident frequency score 1 1 Toilet Transfer [place] Toilet Transfer Score: 0 Comments: NT at this time    0 NT due to decreased LE strength Tub/Shower Transfer [place] Tub or Shower Type: Tub/Shower combination Tub/Shower Transfer Score: 0 Comments: NT at this time due to safety and medical status   NT due to safety Comprehension Primary Mode of Comprehension: Auditory Score: 6 Comments: occasional repetition needed Auditory 5 Expression Primary Mode of Expression: Verbal 
Score: 6 Verbal 
6 Social Interaction Score: 5 Comments: re-direction to task at times 5 Problem Solving Score: 4 Comments: min cues for following spinal precautions 3 Memory Score: 5 5 FIM SCORES Initial Assessment Weekly Progress Assessment 12/26/2019 Bed/Chair/Wheelchair Transfers Transfer Type: Lateral with transfer board Transfer Assistance : 1 (Total assistance)(Max A x 2 for safety, sequencing, appropriate ant trunk ) Sit to Stand Assistance: Total assistance Car Transfers: Not tested Car Type: N/A  txfr type: lateral with transfer board 
txfr assistance: mod assist (after board and w/c set up) pt requires v/c for hands and feet placement. Bed Mobility Rolling Right 2 (Maximal assistance) Rolling Left 2 (Maximal assistance) Supine to Sit 2 (Maximal assistance) Sit to Stand Total assistance Sit to Supine 2 (Maximal assistance) Rolling Right 3 (Moderate assistance ) Rolling Left 
 3 (Moderate assistance ) Supine to Sit 
 2 (Maximal assistance) Sit to Stand 
  unable to achieve with max Ax1 Sit to Supine 2 (Maximal assistance) Locomotion (W/C) Able to Propel (ft): 140 feet(max A for steering) Functional Level: 2 Curbs/Ramps Assist Required (FIM Score): 0 (Not tested) Wheelchair Setup Assist Required : 2 (Maximal assistance) Wheelchair Management: Manages left brake(needing assist with brakes) Function  4 Setup Assistance:max A Locomotion (W/C distance) 140 Feet(max A for steering appropriately)  150ft Locomotion (Walk) 0 (Not tested)  not appropriate at this time Locomotion (Walk dist.) 0 Feet (ft)  0ft Steps/Stairs Steps/Stairs Ambulated (#): 0 Level of Assist : 0 (Not tested) Rail Use: (N/A)  not appropriate at this time Nursing:  
 
Neuro:   AAA&O x  4 Respiratory:   [] WNL   [x] O2 LPM: 3 L Other: 
Peripheral Vascular:   [x] TEDS present   [] Edema present ____ Grade Cardiac:   [] WNL   [] Other Genitourinary:   [] continent   [x] incontinent   [] rousseau  Abdominal _______ LBM 
GI: ___cardiac soft solid____ Diet __thin____ Liquids _____ tube feeds Musculoskeletal: _4x___ ROM Transfers _wheelchair____ Assistive Device Used 
__2x__ Level of Assistance Skin Integumentary:   [x] Intact   [] Not Intact   __________Preventative Measures Details______________________________________________________________ Pain: [x] Controlled   [] Not Controlled   Pain Meds:   [x] Scheduled   [x] PRN Registered Dietitian / Nutrition:  
 
Patient Vitals for the past 100 hrs: 
 % Diet Eaten 12/25/19 1830 100 % 12/25/19 1400 100 % 12/25/19 0930 100 % 12/23/19 1800 100 % 12/23/19 1341 75 % 12/23/19 0953 50 % Pt unavailable at time of visit. Has good/excellent meal intake per chart. Tolerating diet. Consuming 100% of ensure pudding per nursing documentation Supplements:          [x] Yes: ensure Pudding TID   [] No     
Amount of supplement consumed: 100% Intake/Output Summary (Last 24 hours) at 12/26/2019 4597 Last data filed at 12/25/2019 1830 Gross per 24 hour Intake 1560 ml Output  Net 1560 ml Last bowel movement: 12/23/19 Interdisciplinary Team Goals: 1. Discipline  Physical Therapy Goal  Pt will perform bed mobility with min assist while maintaining spinal precautions. Barrier  back pain, BLE weakness, decreased core stability Intervention  bed mobility, slide board txfrs, w/c mobility, BLE therapeutic exercises Goal written by:   Bucky Cortez 2. Discipline  Occupational Therapy Goal  Pt will complete functional toilet transfers using lateral transfer technique and initiate timed toileting Barrier  decreased endurance/balance, incontinent, increased assistance for functional transfers, increased back pain Intervention  education, transfer training, endurance, balance training Goal written by:  LON Felder   
 
3. Discipline  Speech Therapy Goal    
 Barrier Intervention Goal written by: 4. Discipline  Nursing Goal  By discharge pt's pain will be well controlled as recorded by pat's pain level rating Barrier  post surgical incision Intervention  pain meds as needed; relaxation technique; repositioning Goal written by:  Boris Hartley RN  
 
5. Discipline  Clinical Psychology Goal    
 Barrier Intervention Goal written by: 6. Discipline  Nutrition / Dietetics Goal  - PO nutrition intake will continue to meet >75% of patients estimated nutritional needs over the next 7 days. Outcome: Met/Continue Barrier  appetite, improving Intervention  continue po diet and nutrition supplements Goal written by:  Rafael Caldwell RD Disposition / Discharge Planning: Follow-up services:  [x] Physical Therapy [x] Occupational Therapy     
 [] Speech Therapy         
 [] Skilled Nursing    
 [] Medical Social Worker 
 [] Aide        [] Outpatient      [] vs 
 [x] Home Health  [] vs 
     [] to progress to outpatient 
     [] with 24-hour supervision 
     [x] with 24-hour assistance 
 [] Jonathan RAMSAY recommendations:  w/c, slide board, w/c ramp Estimated discharge date:  1/14/20 Discharge Location:  [x] Home  [] versus  
 [] Jonathan Caldwell [] [address] [place] [address] [] Other:   
  
 
 
Electronic Signatures:  
 
 Signature Date Signed Physical Therapist 
  PURNIMA Flynn, PT  12/26/19 12/26/19 Occupational Therapist 
 SABINE Mitchell/JANNET Echeverria, OTR/L 12/26/2019 12/26/19 Speech Therapist 
      
Recreational Therapist 
      
Rebecca Ye RN 12/26/19 Dietitian Zarina Stock, ANDIE  12/26/19 Clinical Psychologist 
      
Physician Min Masters  12/26/19  Lloyd Vázquez, DARIA  12/26/19 The above information has been reviewed with the patient in a language that they can understand. Opportunity for comments and questions has been provided and a signed attestation has been scanned into the \"media tab\" of the EMR. Patient Signature: ______________________________________________________ Date Signed: __________________________________________________________

## 2019-12-27 NOTE — PROGRESS NOTES
Problem: Self Care Deficits Care Plan (Adult)  Goal: *Therapy Goal (Edit Goal, Insert Text)  Description  Occupational Therapy Goals   Long Term Goals  Initiated 19 and to be accomplished within 4 week(s)  to facilitate increased self care independence and strength for return to PLOF and decreased care giver burden:   2. Pt will perform grooming with Mod I.   3. Pt will perform UB bathing with SBA. 4. Pt will perform LB bathing with Tyree. 5. Pt will perform tub/shower transfer <> TTB with Min A.   6. Pt will perform UB dressing with Set-up. 7. Pt will perform LB dressing with Min A.  8. Pt will perform toileting task with Min A.  9. Pt will perform toilet transfer with Min A/CGA. Short Term Goals   Initiated 19 and to be accomplished within 7 day(s) (19) to facilitate increased self care independence and strength for return to PLOF and decreased care giver burden:   1. Pt will perform self-feeding with Mod I.   2. Pt will perform grooming with set-up. (GM)  3. Pt will perform UB bathing with Mod A. (GM)  4. Pt will perform LB bathing with Mod A in LRE. (GM)  5. Pt will perform tub/shower transfer <> TTB with MaxA x 1.  6. Pt will perform UB dressing with SBA. 7. Pt will perform LB dressing with Mod A using AE as needed. (GM)  8. Pt will perform toileting task with Max A x 1. (GM)  9. Pt will perform toilet transfer with MaxA x 1. Outcome Measures:  (19) Dynamometer  strength: Right= 27.33# (norm=55. 1#) Left= 34.67# (norm=45.7#)  (19) 9-hole peg test: Right=45.46 seconds ; Left=41.13 seconds       Outcome: Progressing Towards Goal   OCCUPATIONAL THERAPY TREATMENT    Patient: Treasa Boxer Roots   62 y.o.     Patient identified with name and : Yes     Date: 2019    First Tx Session  Time In: 36  Time Out[de-identified] 200    Second Tx Session  Time In: 1410  Time Out[de-identified] 4577    Diagnosis: Status post laminectomy with spinal fusion [Z98.1]   Precautions:    Chart, occupational therapy assessment, plan of care, and goals were reviewed. Pain:  Pt reports 0/10 pain or discomfort prior to treatment. Pt reports 5/10 pain or discomfort post treatment. Intervention Provided: Pt reported receiving pain medication prior to session     Second session pain:   Pt denied pain this session     SUBJECTIVE:   Patient stated We got 10 mins, I thought you was gonna take me to the dining room.     OBJECTIVE DATA SUMMARY:     THERAPEUTIC ACTIVITY Daily Assessment   Endurance retraining with functional reaching                   FM task to increase dexterity to B hands  Sitting edge of mat with 1# wrist weights donned to B wrists, pt participated in table top game of [employer]. Pt required moderate cues for game play and tending to task as pt focuses on other patients in gym; remains easily distracted. Pt also consumed with making phone calls during session requiring redirection. Sitting in w/c, pt pulled yellow theraputty apart and found x20 beads. Pt did not tend to therapist's cues to manipulate in hands, insisting on pulling putty apart. GROOMING Daily Assessment    Grooming  Grooming Assistance : 5 (Supervision)  Comments: Setup with hand wipe. Mod I for cleaning dentures at sink      TOILETING Daily Assessment   Second session:   Pt reporting she has not voided this afternoon; provided pt with extended time on bedpan (25 mins) and unable to void. Once pt back in w/c at sink, pt reported she was voiding on herself-RN notified to assist.  [address] (FIM Score): 2 (Maximal assistance)(pt able to perform pericare hygiene )  Cues: Physical assistance for pants down;Physical assistance for pants up;Verbal cues provided  Adaptive Equipment: Other (comment)(bedpan )     MOBILITY/TRANSFERS Daily Assessment    Functional transfer EOM>w/c Moderate assist with pt able to achieve appropriate lift off using lateral transfer board techniques.  Min A for placement of LEs throughout with cues     Second session functional transfer:   Simulated toilet transfer w/c<>bed for bedpan use Max A using lateral transfer technique with transfer board        ASSESSMENT:  Pt continues to progress slowly, seemingly plateaued with functional transfers. At this time, pt unsafe to complete toilet transfers due to decreased B LE strength which is a barrier for clothing management. Pt also requires frequent redirection throughout session as she focuses on phone calls/lunch/other patients in gym. Progression toward goals:  []          Improving appropriately and progressing toward goals  [x]          Improving slowly and progressing toward goals  []          Not making progress toward goals and plan of care will be adjusted     PLAN:  Patient continues to benefit from skilled intervention to address the above impairments. Continue treatment per established plan of care. Discharge Recommendations:  Home Health with 24/7 Assistance   Further Equipment Recommendations for Discharge:  3:1 drop arm commode      Activity Tolerance:  Fair; moderate rest breaks       Estimated LOS: 1/14/2020    Please refer to the flowsheet for vital signs taken during this treatment. After treatment:   [x]  Patient left in no apparent distress sitting up in chair   []  Patient left in no apparent distress in bed  []  Call bell left within reach  []  Nursing notified  []  Caregiver present  []  Bed alarm activated    COMMUNICATION/EDUCATION:   [x] Home safety education was provided and the patient/caregiver indicated understanding. [x] Patient/family have participated as able in goal setting and plan of care. [x] Patient/family agree to work toward stated goals and plan of care. [] Patient understands intent and goals of therapy, but is neutral about his/her participation. [] Patient is unable to participate in goal setting and plan of care.       SABINE Walker/JANNET

## 2019-12-27 NOTE — PROGRESS NOTES
SHIFT CHANGE NOTE FOR [facility]    Bedside and Verbal shift change report given to Manuel Guzman RN (oncoming nurse) by Brigida Iraheta RN (offgoing nurse). Report included the following information SBAR, Kardex, MAR and Recent Results.     Situation:   Code Status: Full Code   Reason for Admission: Spinal Thoracic laminectomy T 6-T [address] Day: 11   Problem List:   Hospital Problems  Date Reviewed: 12/25/2019          Codes Class Noted POA    Chronic respiratory failure with hypoxia (HCC) (Chronic) ICD-10-CM: J96.11  ICD-9-CM: 518.83, 799.02  Unknown Yes    Overview Signed 12/16/2019 10:11 PM by Isaac Beverly MD     On home oxygen inhalation at 3 LPM via [place]             Opioid dependence ([facility] [address] (Chronic) ICD-10-CM: F11.20  ICD-9-CM: 304.00  Unknown Yes    Overview Signed 12/16/2019 10:54 PM by Isaac Beverly MD     On Methadone             Acute blood loss as cause of postoperative anemia ICD-10-CM: D62  ICD-9-CM: 285.1  12/12/2019 Yes        Impaired mobility and ADLs ICD-10-CM: Z74.09  ICD-9-CM: 799.89  12/11/2019 Yes        Spinal cord compression ([facility] ICD-10-CM: G95.20  ICD-9-CM: 336.9  12/11/2019 Yes        Compression fracture of body of thoracic vertebra (HCC) ICD-10-CM: S22.000A  ICD-9-CM: 805.2  12/11/2019 Yes        * (Principal) Status post laminectomy with spinal fusion ICD-10-CM: Z98.1  ICD-9-CM: V45.4  12/11/2019 Yes    Overview Signed 12/16/2019 10:05 PM by Isaac Beverly MD     S/P T10, T9, T8 laminectomy; T7, T8, T9, T10, T11, T12 posterior thoracic fusion; segmental spinal instrumentation; K2M [place] type, T7-T8, T11-T12 (12/11/2019 - Dr. Julia Munoz)             History of fracture due to fall ICD-10-CM: Z87.81  ICD-9-CM: V15.51  12/11/2019 Yes        Hypoxemia requiring supplemental oxygen (Chronic) ICD-10-CM: R09.02, Z99.81  ICD-9-CM: 799.02  12/29/2014 Yes        Chronic obstructive pulmonary disease (COPD) (HCC) (Chronic) ICD-10-CM: J44.9  ICD-9-CM: 496  Unknown Yes    Overview Signed 4/23/2013 10:01 AM by Kim Stock     SOB, on paula O2  Recent admission with psychosis             Hypertensive heart disease without heart failure (Chronic) ICD-10-CM: I11.9  ICD-9-CM: 402.90  Unknown Yes    Overview Signed 4/23/2013 10:02 AM by Katya CALLES     Better controlled             Type 2 diabetes mellitus with diabetic neuropathy, with long-term current use of insulin ([facility] [address] (Chronic) ICD-10-CM: E11.40, Z79.4  ICD-9-CM: 250.60, 357.2, V58.67  Unknown Yes    Overview Signed 12/18/2019  2:13 PM by Jamey Griffin MD     HbA1c (12/11/2019) = 7.1                   Background:   Past Medical History:   Past Medical History:   Diagnosis Date    Abnormally low high density lipoprotein (HDL) cholesterol with hypertriglyceridemia     Lipid profile (11/6/2016) showed , , HDL 38, LDL 37    Acute blood loss as cause of postoperative anemia 12/12/2019    Anxiety     Bipolar affective disorder ([facility] [address] 12/5/2012    Cellulitis of right forearm 05/04/2017    Chronic back pain     Chronic obstructive pulmonary disease (COPD) (HCC)     SOB, on [place] O2 Recent admission with psychosis     Chronic respiratory failure with hypoxia (HCC)     On home oxygen inhalation at 3 LPM via [place]    Contusion of left elbow 5/4/2017    Depression     Diabetic neuropathy associated with type 2 diabetes mellitus (HCC)     Diastolic dysfunction without heart failure     Stable on diuretics     Falls frequently     Gastroesophageal reflux disease with hiatal hernia     Generalized osteoarthritis of multiple sites     Gout     On Allopurinol    History of acute renal failure 5/31/2013    History of back injury     jumped out of second story window     History of fracture due to fall 12/11/2019    History of hepatitis C     treated    History of penicillin allergy     History of vitamin D deficiency 5/10/2017    Hypertensive heart disease without heart failure     Better controlled     Hyperuricemia 5/26/2017    Hypothyroidism     Hypoxemia requiring supplemental oxygen 12/29/2014    Intravenous drug user 5/2/2017    Long term current use of aspirin     Memory difficulty     Menopause     Mixed connective tissue disease ([facility] [address] 5/10/2017    Obesity, Class I, BMI 30-34.9     Olecranon bursitis of right elbow 5/4/2017    [facility]     On Methadone    Recurrent genital herpes 5/31/2013    Right Achilles tendinitis 5/2/2017    Sarcoidosis     Sickle cell trait ([facility]     Tobacco use disorder     Type 2 diabetes mellitus with diabetic neuropathy, with long-term current use of insulin ([facility])     HbA1c (12/11/2019) = 7.1    Urge urinary incontinence 5/10/2017    Venous insufficiency     Wears glasses       Patient taking anticoagulants no    Patient has a defibrillator: no     Assessment:   Changes in Assessment throughout shift: None     Patient has central line: no Reasons if yes: Removed 12/16/19  Insertion date:n/a Last dressing date:n/a   Patient has Renae Cath: no Reasons if yes: n/a   Insertion date:n/a     Last Vitals:     Vitals:    12/26/19 1552 12/26/19 2234 12/27/19 0821 12/27/19 1617   BP: 103/63 121/62 120/71 118/75   Pulse: 82 65 72 77   Resp: 18 18 18 18   Temp: 99.2 °F (37.3 °C) 98 °F (36.7 °C) 98.1 °F (36.7 °C) 98.6 °F (37 °C)   SpO2: 99% 100% 100% 99%   Weight:       LMP: 11/25/2012        PAIN    Pain Assessment    Pain Intensity 1: 6 (12/27/19 1623) Pain Intensity 1: 2 (12/29/14 1105)    Pain Location 1: Back Pain Location 1: Abdomen    Pain Intervention(s) 1: Medication (see MAR) Pain Intervention(s) 1: Medication (see MAR)  Patient Stated Pain Goal: 0 Patient Stated Pain Goal: 0  o Intervention effective: yes    o Other actions taken for pain: no c/o     Skin Assessment  Skin color Skin Color: Appropriate for ethnicity  Condition/Temperature Skin Condition/Temp: Warm  Integrity Skin Integrity: Incision (comment)  Turgor Turgor: Non-tenting  Weekly Pressure Ulcer Documentation  Pressure  Injury Documentation: No Pressure Injury Noted-Pressure Ulcer Prevention Initiated  Wound Prevention & Protection Methods  Orientation of wound Orientation of Wound Prevention: Posterior  Location of Prevention Location of Wound Prevention: Buttocks, Sacrum/Coccyx  Dressing Present Dressing Present : No  Dressing Status Dressing Status: Intact  Wound Offloading Wound Offloading (Prevention Methods): Bed, pressure reduction mattress, Chair cushion, Wheelchair     INTAKE/OUPUT  Date 12/26/19 0700 - 12/27/19 0659 12/27/19 0700 - 12/28/19 0659   Shift 0700-1859 1900-0659 24 Hour Total 0700-1859 1900-0659 24 Hour Total   INTAKE   P.O. 240  240        P. O. 240  240      Shift Total(mL/kg) 240(5.6)  240(5.6)      OUTPUT   Urine(mL/kg/hr)           Urine Occurrence(s) 3 x 3 x 6 x 1 x  1 x   Stool           Stool Occurrence(s) 0 x 0 x 0 x 0 x  0 x   Shift Total(mL/kg)           240      Weight (kg) 42.9 42.9 42.9 42.9 42.9 42.9       Recommendations:  1. Patient needs and requests: met    2. Diet: Cardiac DM Reg /thin liq    3. Pending tests/procedures: none     4. Functional Level/Equipment: wheelchair    5. Estimated Discharge Date: TBD Posted on Whiteboard in Patients Room: yes     HEALS Safety Check    A safety check occurred in the patient's room between off going nurse and oncoming nurse listed above.     The safety check included the below items  Area Items   H  High Alert Medications - Verify all high alert medication drips (heparin, PCA, etc.)   E  Equipment - Suction is set up for ALL patients (with yanker)  - Red plugs utilized for all equipment (IV pumps, etc.)  - WOWs wiped down at end of shift.  - Room stocked with oxygen, suction, and other unit-specific supplies   A  Alarms - Bed alarm is set for fall risk patients  - Ensure chair alarm is in place and activated if patient is up in a chair   L  Lines - Check IV for any infiltration  - Renae bag is empty if patient has a Renae   - Tubing and IV bags are labeled   S  Safety   - Room is clean, patient is clean, and equipment is clean. - Hallways are clear from equipment besides carts. - Fall bracelet on for fall risk patients  - Ensure room is clear and free of clutter  - Suction is set up for ALL patients (with chantellker)  - Hallways are clear from equipment besides carts.    - Isolation precautions followed, supplies available outside room, sign posted

## 2019-12-27 NOTE — PROGRESS NOTES
SHIFT CHANGE NOTE FOR [facility] Bedside and Verbal shift change report given to [facility] (oncoming nurse) by Greyson Reyes (offgoing nurse). Report included the following information SBAR, Kardex, MAR and Recent Results. Situation: 
 Code Status: Full Code Reason for Admission: Spinal Thoracic laminectomy T 6-T 11 Hospital Day: 11 Problem List:  
Hospital Problems  Date Reviewed: 12/25/2019 Codes Class Noted POA Chronic respiratory failure with hypoxia (HCC) (Chronic) ICD-10-CM: J96.11 
ICD-9-CM: 518.83, 799.02  Unknown Yes Overview Signed 12/16/2019 10:11 PM by David Carson MD  
  On home oxygen inhalation at 3 LPM via [place] Opioid dependence (HCC) (Chronic) ICD-10-CM: S53.25 ICD-9-CM: 304.00  Unknown Yes Overview Signed 12/16/2019 10:54 PM by David Carson MD  
  On Methadone Acute blood loss as cause of postoperative anemia ICD-10-CM: D62 
ICD-9-CM: 285.1  12/12/2019 Yes Impaired mobility and ADLs ICD-10-CM: Z74.09 
ICD-9-CM: 799.89  12/11/2019 Yes Spinal cord compression (HCC) ICD-10-CM: G95.20 ICD-9-CM: 336.9  12/11/2019 Yes Compression fracture of body of thoracic vertebra (HCC) ICD-10-CM: S22.000A ICD-9-CM: 805.2  12/11/2019 Yes * (Principal) Status post laminectomy with spinal fusion ICD-10-CM: Z98.1 ICD-9-CM: V45.4  12/11/2019 Yes Overview Signed 12/16/2019 10:05 PM by David Carson MD  
  S/P T10, T9, T8 laminectomy; T7, T8, T9, T10, T11, T12 posterior thoracic fusion; segmental spinal instrumentation; K2M [place] type, T7-T8, T11-T12 (12/11/2019 - Dr. Steven Saba) History of fracture due to fall ICD-10-CM: Z87.81 ICD-9-CM: V15.51  12/11/2019 Yes Hypoxemia requiring supplemental oxygen (Chronic) ICD-10-CM: R09.02, Z99.81 ICD-9-CM: 799.02  12/29/2014 Yes Chronic obstructive pulmonary disease (COPD) (HCC) (Chronic) ICD-10-CM: J44.9 ICD-9-CM: 415  Unknown Yes Overview Signed 4/23/2013 10:01 AM by Christina CALLES  
  SOB, on paula O2 Recent admission with psychosis Hypertensive heart disease without heart failure (Chronic) ICD-10-CM: I11.9 ICD-9-CM: 402.90  Unknown Yes Overview Signed 4/23/2013 10:02 AM by Nuris Cornejo Better controlled Type 2 diabetes mellitus with diabetic neuropathy, with long-term current use of insulin (HCC) (Chronic) ICD-10-CM: E11.40, Z79.4 ICD-9-CM: 250.60, 357.2, V58.67  Unknown Yes Overview Signed 12/18/2019  2:13 PM by Dee Pang MD  
  HbA1c (12/11/2019) = 7.1 Background: 
 Past Medical History:  
Past Medical History:  
Diagnosis Date  Abnormally low high density lipoprotein (HDL) cholesterol with hypertriglyceridemia Lipid profile (11/6/2016) showed , , HDL 38, LDL 37  Acute blood loss as cause of postoperative anemia 12/12/2019  Anxiety  Bipolar affective disorder ([address] 12/5/2012  Cellulitis of right forearm 05/04/2017  Chronic back pain  Chronic obstructive pulmonary disease (COPD) ([address] SOB, on paula O2 Recent admission with psychosis  Chronic respiratory failure with hypoxia ([address] On home oxygen inhalation at 3 LPM via [place]  
 Contusion of left elbow 5/4/2017  Depression  Diabetic neuropathy associated with type 2 diabetes mellitus ([address]  Diastolic dysfunction without heart failure Stable on diuretics  Falls frequently  Gastroesophageal reflux disease with hiatal hernia  Generalized osteoarthritis of multiple sites  Gout On Allopurinol  History of acute renal failure 5/31/2013  History of back injury   
 jumped out of second story window  History of fracture due to fall 12/11/2019  
 History of hepatitis C   
 treated  History of penicillin allergy  History of vitamin D deficiency 5/10/2017  Hypertensive heart disease without heart failure Better controlled  Hyperuricemia 5/26/2017  Hypothyroidism  Hypoxemia requiring supplemental oxygen 12/29/2014  Intravenous drug user 5/2/2017  Long term current use of aspirin  Memory difficulty  Menopause  Mixed connective tissue disease ([facility] [address] 5/10/2017  Obesity, Class I, BMI 30-34.9  Olecranon bursitis of right elbow 5/4/2017  Opioid dependence ([facility] [address] On Methadone  Recurrent genital herpes 5/31/2013  Right Achilles tendinitis 5/2/2017  Sarcoidosis  S[facility]  Tobacco use disorder  Type 2 diabetes mellitus with diabetic neuropathy, with long-term current use of insulin ([facility] [address] HbA1c (12/11/2019) = 7.1  Urge urinary incontinence 5/10/2017  Venous insufficiency  Wears glasses Patient taking anticoagulants no Patient has a defibrillator: no  
 
Assessment: 
? Changes in Assessment throughout shift: None ? Patient has central line: no Reasons if yes: Removed 12/16/19 Insertion date:n/a Last dressing date:n/a 
? Patient has Renae Cath: no Reasons if yes: n/a Insertion date:n/a 
 
? Last Vitals: 
  
Vitals:  
 12/25/19 2225 12/26/19 0803 12/26/19 1552 12/26/19 2234 BP: 124/70 140/81 103/63 121/62 Pulse: 77 74 82 65 Resp: 18 18 18 18 Temp: 98 °F (36.7 °C) 98.9 °F (37.2 °C) 99.2 °F (37.3 °C) 98 °F (36.7 °C) SpO2: 99% 100% 99% 100% Weight:      
LMP: 11/25/2012  
 
 
? PAIN Pain Assessment Pain Intensity 1: 0 (12/27/19 0407) Pain Intensity 1: 2 (12/29/14 1105) Pain Location 1: Back Pain Location 1: Abdomen Pain Intervention(s) 1: Medication (see MAR) Pain Intervention(s) 1: Medication (see MAR) Patient Stated Pain Goal: 0 Patient Stated Pain Goal: 0 
o Intervention effective: yes   
o Other actions taken for pain: no c/o 
 
? Skin Assessment Skin color Skin Color: Appropriate for ethnicity Condition/Temperature Skin Condition/Temp: Warm Integrity Skin Integrity: Incision (comment) Turgor Turgor: Non-tenting Weekly Pressure Ulcer Documentation  Pressure  Injury Documentation: No Pressure Injury Noted-Pressure Ulcer Prevention Initiated Wound Prevention & Protection Methods Orientation of wound Orientation of Wound Prevention: Posterior Location of Prevention Location of Wound Prevention: Sacrum/Coccyx Dressing Present Dressing Present : No 
Dressing Status Dressing Status: Intact Wound Offloading Wound Offloading (Prevention Methods): Bed, pressure reduction mattress, Bed, pressure redistribution/air ? INTAKE/OUPUT Date 12/26/19 0700 - 12/27/19 4817 12/27/19 0700 - 12/28/19 3878 Shift 0700-1859 1900-0659 24 Hour Total 0700-1859 1900-0659 24 Hour Total  
INTAKE  
P.O. 240  240     
  P. O. 240  240 Shift Total(mL/kg) 240(5.6)  240(5.6) OUTPUT Urine(mL/kg/hr) Urine Occurrence(s) 3 x 3 x 6 x Stool Stool Occurrence(s) 0 x 0 x 0 x Shift Total(mL/kg)   240 Weight (kg) 42.9 42.9 42.9 42.9 42.9 42.9 Recommendations: 1. Patient needs and requests: met 2. Diet: Cardiac DM Reg Jose Daniel torres 3. Pending tests/procedures: none 4. Functional Level/Equipment: wheelchair 5. Estimated Discharge Date: TBD Posted on Whiteboard in Patients Room: yes HEALS Safety Check A safety check occurred in the patient's room between off going nurse and oncoming nurse listed above. The safety check included the below items Area Items H High Alert Medications ? Verify all high alert medication drips (heparin, PCA, etc.) E Equipment ? Suction is set up for ALL patients (with yanker) ? Red plugs utilized for all equipment (IV pumps, etc.) ? WOWs wiped down at end of shift. ? Room stocked with oxygen, suction, and other unit-specific supplies A Alarms ? Bed alarm is set for fall risk patients ? Ensure chair alarm is in place and activated if patient is up in a chair L Lines ? Check IV for any infiltration ? Renae bag is empty if patient has a Renae ? Tubing and IV bags are labeled [place] Safety ? Room is clean, patient is clean, and equipment is clean. ? Hallways are clear from equipment besides carts. ? Fall bracelet on for fall risk patients ? Ensure room is clear and free of clutter ? Suction is set up for ALL patients (with lashay) ? Hallways are clear from equipment besides carts. ? Isolation precautions followed, supplies available outside room, sign posted

## 2019-12-27 NOTE — PROGRESS NOTES
Problem: Mobility Impaired (Adult and Pediatric)  Goal: *Therapy Goal (Edit Goal, Insert Text)  Description  Physical Therapy Goals: STG  Initiated 12/17/2019, re-assessed 12/24/19 and to be accomplished within 7 day(s) on 12/31/2019:  1. Patient will move from supine to sit and sit to supine , scoot up and down and roll side to side in bed with moderate assistance .   (mod/maxA)  2. Patient will transfer from bed to chair and chair to bed with maximal assistance with one person assist using the least restrictive device. (MET with slide board)  3. Patient will perform sit to stand with maximal assistance with one person assist using appropriate AD. (unable at this time)  4. Patient will perform static sitting balance without UE support for at least 2 minutes, demonstrating appropriate upright and midline posture at supervision level for ease of preparation for transfers. (CGA)  5. Patient will perform wheelchair mobility moderate assist for 150 ft distance over even surfaces. (MET)    Physical Therapy Goals: LTG  Initiated 12/17/2019 and to be accomplished within 28 day(s) on 01/14/2019:   1. Patient will move from supine to sit and sit to supine , scoot up and down and roll side to side in bed with supervision/set-up. 2.  Patient will transfer from bed to chair and chair to bed with contact guard assist/stand-by assist using the least restrictive device. 3.  Patient will perform sit to stand with minimal assistance/contact guard assist.  4.  Patient will participate in gait assessment if and when appropriate. 5.  Patient will perform wheelchair mobility over even surfaces at supervision level for at least 150 ft, including negotiation of doorways. 6.  Patient will perform wheelchair mobility up/down ramp, uneven sidewalk min A level.         Outcome: Progressing Towards Goal   PHYSICAL THERAPY TREATMENT    Patient: Silvio Sinha (65 y.o. female)  Date: 12/27/2019  Diagnosis: Status post laminectomy with spinal fusion [Z98.1] Status post laminectomy with spinal fusion  Precautions:  falls, spinal precautions, O2   Chart, physical therapy assessment, plan of care and goals were reviewed. Time In: 1030  Time Out: 1130  Patient Seen for: balance training, transfers training, bed mobility, and pre-gait activities  Time In: 1335  Time Out: 1405  Patient Seen For: Wheelchair mobility; Therapeutic exercise  Pain:  Pt pain was reported as  8/10, in upper back pre-treatment. Pt pain was reported as 7/10, in same location,  post-treatment. Intervention: pain meds received as scheduled prior to session    Patient identified with name and : yes    SUBJECTIVE:     \"I'm going to have someone buy me some good shoes when I get my check on the . \"   During pm session: pt reported being \"tired out\"     OBJECTIVE DATA SUMMARY:   Objective:     BED/MAT MOBILITY Daily Assessment    Rolling Left : 3 (Moderate assistance )(to help manage LE's, pt able to turn upper body)  Supine to Sit : 2 (Maximal assistance)(to manage LE's & initiate lift off of trunk;, mod A after )  Sit to Supine : 2 (Maximal assistance)(for LE maangement & maintain spinal precuations )       TRANSFERS Daily Assessment    Transfer Type: Lateral with transfer board  Transfer Assistance : 3 (Moderate assistance )(after set-up of board; pt positioned w/c & moved arm rest )  Sit to Stand Assistance: Total assistance(from w/c, at p-bars, 2 person assist)  Sit to stand at p-bars attempted, but pt required assist x 2 people and was not able to bear wt on LE's to support herself. Pt was able to push up through B LE's to help clear bottom off seat, but then needed max A to transition hands to bars. Pt is unable to pull on bars to help stand due to spinal precautions. Worked a lot on w/c push-ups and achieving anterior wt shift while pushing up to clear bottom for prep for sit <> stand.        BALANCE Daily Assessment    Sitting - Static: Fair (occasional)  Sitting - Dynamic: Fair (occasional)(minus; LOB with reaching out of SERA)  Standing - Static: Poor  Standing - Dynamic : Impaired       WHEELCHAIR MOBILITY Daily Assessment    Able to Propel (ft): 160 feet  Functional Level: 4  Curbs/Ramps Assist Required (FIM Score): 0 (Not tested)  Wheelchair Setup Assist Required : 4 (Minimal assistance)  Wheelchair Management: Manages left brake;Manages right brake;Manages left armrest;Manages right armrest(needs assistance for footrests)       THERAPEUTIC EXERCISES Daily Assessment    Extremity: Both  Exercise Type #1: Other (comment)(AAROM hip abd/add in hook-lying position)  Sets Performed: 1  Reps Performed: 10  Level of Assist: Maximum assistance  Exercise Type #2: Seated lower extremity strengthening(AAROM: marches, hip abd/add, LAQ's)  Sets Performed: 1  Reps Performed: 10  Level of Assist: Maximum assistance  Exercise Type #3: Other (comment)(knee flex with foot on towel on floor to decrease friction)  Sets Performed: 1  Reps Performed: 5  Level of Assist: Maximum assistance       Neuro Re-Education:  Sitting balance at EOM, working on reaching, wt shifting, and forward flexion/lateral flexion activities. ASSESSMENT:  Pt remains cooperative, and has demonstrated better lift off to scoot across board during transfers, but continues to require significant assistance for LE management during bed mobility and transfers, and unable to stand due to continued weakness in B LE's and ongoing pain issues. Pt is easily distracted during sessions. Progression toward goals:  []      Improving appropriately and progressing toward goals  []      Improving slowly and progressing toward goals  [x]      Not making progress toward goals and plan of care will be adjusted     PLAN:  Patient continues to benefit from skilled intervention to address the above impairments. Continue treatment per established plan of care.  Recommend trying standing frame with pt at next session to encourage wt bearing on LE's to promote increased strength. Discharge Recommendations:  SNF vs home health with 24 hr assistance  Further Equipment Recommendations for Discharge:  yareli height wheelchair, cushion, and lateral transfer board     Estimated LOS: 4 weeks    Activity Tolerance:   Fair, pt gets fatigued easily and is limited by pain levels also, no c/o dizziness or nausea  Please refer to the flowsheet for vital signs taken during this treatment. After treatment:   Patient left sitting up in w/c and handed off to OT for continued therapy intervention.       Niko Padron, PT  12/27/2019

## 2019-12-28 NOTE — PROGRESS NOTES
[facility] PHYSICAL REHABILITATION  [address] [place] [address], [address]     INPATIENT REHABILITATION  DAILY PROGRESS NOTE     Date: 12/27/2019    Name: Santos Hdz Age / Sex: 62 y.o. / female   CSN: [id number] MRN: [id number]   [facility] Date: 12/16/2019 Length of Stay: 11 days     Primary Rehab Diagnosis: Impaired Mobility and ADLs secondary to:  1. S/P T10, T9, T8 laminectomy; T7, T8, T9, T10, T11, T12 posterior thoracic fusion; segmental spinal instrumentation; K2M Velva type, T7-T8, T11-T12 (12/11/2019 - Dr. Al Baez)  2.  History of acute compression fractures of body of thoracic vertebrae (T9 and T10) due to fall      Subjective:     Patient sitting in a chair in NAD, denies sob    Objective:     Vital Signs:  Patient Vitals for the past 24 hrs:   BP Temp Pulse Resp SpO2   12/27/19 1617 118/75 98.6 °F (37 °C) 77 18 99 %   12/27/19 0821 120/71 98.1 °F (36.7 °C) 72 18 100 %   12/26/19 2234 121/62 98 °F (36.7 °C) 65 18 100 %        Physical Examination:  General:  Awake, alert  Cardiovascular:  S1S2+, RRR  Pulmonary:  Coarse bs b/l  GI:  Soft, BS+, NT, ND  Extremities:  No edema          Current Medications:  Current Facility-Administered Medications   Medication Dose Route Frequency    insulin glargine (LANTUS) injection 25 Units  25 Units SubCUTAneous ACB    insulin lispro (HUMALOG) injection 4 Units  4 Units SubCUTAneous TIDAC    lubiPROStone (AMITIZA) capsule 24 mcg  24 mcg Oral DAILY WITH BREAKFAST    guaiFENesin ER (MUCINEX) tablet 600 mg  600 mg Oral Q12H    acetaminophen (TYLENOL) tablet 650 mg  650 mg Oral Q4H PRN    bisacodyl (DULCOLAX) tablet 10 mg  10 mg Oral Q48H PRN    ferrous sulfate tablet 325 mg  1 Tab Oral DAILY WITH BREAKFAST    ascorbic acid (vitamin C) (VITAMIN C) tablet 250 mg  250 mg Oral DAILY WITH BREAKFAST    insulin lispro (HUMALOG) injection   SubCUTAneous TIDAC    senna-docusate (PERICOLACE) 8.6-50 mg per tablet 2 Tab  2 Tab Oral PCD    methadone (DOLOPHINE) tablet 20 mg  20 mg Oral DAILY    oxyCODONE-acetaminophen (PERCOCET 10)  mg per tablet 1 Tab  1 Tab Oral Q4H PRN    predniSONE (DELTASONE) tablet 30 mg  30 mg Oral DAILY WITH BREAKFAST    famotidine (PEPCID) tablet 20 mg  20 mg Oral BID    rosuvastatin (CRESTOR) tablet 5 mg  5 mg Oral QHS    albuterol (PROVENTIL VENTOLIN) nebulizer solution 2.5 mg  2.5 mg Nebulization Q4H PRN    levothyroxine (SYNTHROID) tablet 75 mcg  75 mcg Oral 6am    cholecalciferol (VITAMIN D3) (400 Units /10 mcg) tablet 5 Tab  2,000 Units Oral DAILY    calcium citrate tablet 200 mg [elemental]  200 mg Oral BID    divalproex DR (DEPAKOTE) tablet 500 mg  500 mg Oral QHS    fluticasone-vilanterol (BREO ELLIPTA) 100mcg-25mcg/puff  1 Puff Inhalation DAILY    oxybutynin (DITROPAN) tablet 5 mg  5 mg Oral BID    allopurinol (ZYLOPRIM) tablet 100 mg  100 mg Oral DAILY    sertraline (ZOLOFT) tablet 50 mg  50 mg Oral DAILY    methocarbamol (ROBAXIN) tablet 500 mg  500 mg Oral Q8H    aspirin chewable tablet 81 mg  81 mg Oral DAILY WITH BREAKFAST    docusate sodium (COLACE) capsule 100 mg  100 mg Oral DAILY AFTER BREAKFAST       Allergies:   Allergies   Allergen Reactions    Moxifloxacin Rash    Pcn [Penicillins] Swelling    Sulfa (Sulfonamide Antibiotics) Swelling       Functional Progress:    PHYSICAL THERAPY    ON ADMISSION MOST RECENT   Wheelchair Mobility/Management  Able to Propel (ft): 140 feet(max A for steering)  Functional Level: 2  Curbs/Ramps Assist Required (FIM Score): 0 (Not tested)  Wheelchair Setup Assist Required : 2 (Maximal assistance)  Wheelchair Management: Manages left brake(needing assist with brakes) Wheelchair Mobility/Management  Able to Propel (ft): 160 feet  Functional Level: 4  Curbs/Ramps Assist Required (FIM Score): 0 (Not tested)  Wheelchair Setup Assist Required : 4 (Minimal assistance)  Wheelchair Management: Manages left brake, Manages right brake, Manages left armrest, Manages right armrest(needs assistance for footrests)     Gait  Amount of Assistance: 0 (Not tested)  Distance (ft): 0 Feet (ft)  Assistive Device: (N/A) Gait  Amount of Assistance: 0 (Not tested)  Distance (ft): 0 Feet (ft)  Assistive Device: (N/A)     Balance-Sitting/Standing  Sitting - Static: Fair (occasional)  Sitting - Dynamic: Fair (occasional), Occassional, Poor (constant support)  Standing - Static: Poor, Constant support  Standing - Dynamic : (not tested) Balance-Sitting/Standing  Sitting - Static: Fair (occasional)  Sitting - Dynamic: Fair (occasional)(minus; LOB with reaching out of SERA)  Standing - Static: Poor  Standing - Dynamic : Impaired     Bed/Mat Mobility  Rolling Right : 2 (Maximal assistance)  Rolling Left : 2 (Maximal assistance)  Supine to Sit : 2 (Maximal assistance)  Sit to Supine : 2 (Maximal assistance) Bed/Mat Mobility  Rolling Right : 3 (Moderate assistance )  Rolling Left : 3 (Moderate assistance )(to help manage LE's, pt able to turn upper body)  Supine to Sit : 2 (Maximal assistance)(to manage LE's & initiate lift off of trunk;, mod A after )  Sit to Supine : 2 (Maximal assistance)(for LE maangement & maintain spinal precuations )     Transfers  Transfer Type: Lateral with transfer board  Transfer Assistance : 1 (Total assistance)(Max A x 2 for safety, sequencing, appropriate ant trunk )  Sit to Stand Assistance: Total assistance  Car Transfers: Not tested  Car Type: N/A Transfers  Transfer Type: Lateral with transfer board  Transfer Assistance : 3 (Moderate assistance )(after set-up of board; pt positioned w/c & moved arm rest )  Sit to Stand Assistance:  Total assistance(from w/c, at p-bars, 2 person assist)  Car Transfers: Not tested  Car Type: n/a     Steps or Stairs  Steps/Stairs Ambulated (#): 0  Level of Assist : 0 (Not tested)  Rail Use: (N/A) Steps or Stairs  Steps/Stairs Ambulated (#): 0  Level of Assist : 0 (Not tested)  Rail Use: (N/A)         Lab/Data Review:  Recent Results (from the past 24 hour(s))   GLUCOSE, POC    Collection Time: 12/26/19  8:53 PM   Result Value Ref Range    Glucose (POC) 145 (H) 70 - 110 mg/dL   GLUCOSE, POC    Collection Time: 12/27/19  7:47 AM   Result Value Ref Range    Glucose (POC) 76 70 - 110 mg/dL   GLUCOSE, POC    Collection Time: 12/27/19 12:17 PM   Result Value Ref Range    Glucose (POC) 153 (H) 70 - 110 mg/dL   GLUCOSE, POC    Collection Time: 12/27/19  5:09 PM   Result Value Ref Range    Glucose (POC) 284 (H) 70 - 110 mg/dL           Assessment:     Primary Rehabilitation Diagnosis  1. Impaired Mobility and ADLs  2. S/P T10, T9, T8 laminectomy; T7, T8, T9, T10, T11, T12 posterior thoracic fusion; segmental spinal instrumentation; K2M Davis type, T7-T8, T11-T12 (12/11/2019 - Dr. Harsha Amin)  3.  History of acute compression fractures of body of thoracic vertebrae (T9 and T10) due to fall     Comorbidities   Diabetic neuropathy associated with type 2 diabetes mellitus (HCC) E11.40    Venous insufficiency I87.2    Obesity, Class I, BMI 30-34.9 E66.9    Chronic back pain M54.9, G89.29    Generalized osteoarthritis of multiple sites M15.9    Bipolar affective disorder  F31.9    Abnormally low high density lipoprotein (HDL) cholesterol with hypertriglyceridemia E78.6, J94.4    Diastolic dysfunction without heart failure I51.89    Chronic obstructive pulmonary disease (COPD)  J44.9    Hypertensive heart disease without heart failure I11.9    Depression F32.9    History of back injury Z87.828    Type 2 diabetes mellitus with diabetic neuropathy, with long-term current use of insulin  E11.40, Z79.4    Anxiety F41.9    Wears glasses Z97.3    History of hepatitis C Z86.19    Sickle cell trait D57.3    History of acute renal failure Z87.448    Hypothyroidism E03.9    Recurrent genital herpes A60.00    Sarcoidosis D86.9    Abscess of right arm L02.413    Hypoxemia requiring supplemental oxygen R09.02, Z99.81    Abnormal nuclear stress test R94.39  Cellulitis and abscess of hand L03.119, L02.519    Cellulitis and abscess of foot L03.119, L02.619    Cellulitis of arm, right L03. 113    Olecranon bursitis of right elbow M70.21    Contusion of left elbow S50. 02XA    Intravenous drug user F19.90    Right Achilles tendinitis M76.61    History of penicillin allergy Z88.0    Falls frequently R29.6    Gastroesophageal reflux disease with hiatal hernia K21.9, K44.9    Memory difficulty R41.3    Mixed connective tissue disease  M35.1    Hyperuricemia E79.0    History of vitamin D deficiency Z86.39    Urge urinary incontinence N39.41    Acute blood loss as cause of postoperative anemia D62    History of fracture due to fall Z87.81    Chronic respiratory failure with hypoxia  J96.11    Cellulitis of right forearm L03. 113    Gout M10.9    Menopause Z78.0    Long term current use of aspirin Z79.82    Opioid dependence  F11.20    Tobacco use disorder F17.200        Plan:     1. Justification for continued stay: Good progression towards established rehabilitation goals. 2. Medical Issues being followed closely:    [x]  Fall and safety precautions     []  Wound Care     [x]  Bowel and Bladder Function     [x]  Fluid Electrolyte and Nutrition Balance     []  Pain Control      3. Issues that 24 hour rehabilitation nursing is following:    [x]  Fall and safety precautions     []  Wound Care     [x]  Bowel and Bladder Function     [x]  Fluid Electrolyte and Nutrition Balance     []  Pain Control      [x]  Assistance with and education on in-room safety with transfers to and from the bed, wheelchair, toilet and shower. 4. Acute rehabilitation plan of care:    [x]  Continue current care and rehab. [x]  Physical Therapy           [x]  Occupational Therapy           []  Speech Therapy     []  Hold Rehab until further notice     5. Medications:    [x]  MAR Reviewed     [x]  Continue Present Medications     6.  DVT Prophylaxis:      []  Lovenox []  Unfractionated Heparin     []  Coumadin     []  NOAC     [x]  ROBERT Stockings     []  Sequential Compression Device     []  None     7. Orders:   > S/P T10, T9, T8 laminectomy; T7, T8, T9, T10, T11, T12 posterior thoracic fusion; segmental spinal instrumentation; K2M Davis type, T7-T8, T11-T12 (12/11/2019 - Dr. Karol Gregg); History of acute compression fractures of body of thoracic vertebrae (T9 and T10) due to fall   > Thoracic spinal precautions   > Continue:    > Calcium citrate 200 mg PO BID    > Cholecalciferol 2,000 units PO once daily                D/w Spine NP earlier today    > Acute Postoperative Blood Loss Anemia   > ferrous sulfate, vit C    > 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 congestive heart failure   > monitor off diuretics    > Chronic obstructive pulmonary disease; Chronic respiratory failure with hypoxemia requiring supplemental oxygen (3 LPM)   > Continue:    > breo ellipta, O2    > Opioid dependence   > on methadone    > Type 2 diabetes mellitus with stage 3 chronic kidney disease   > HbA1c (12/11/2019) = 7.1   >lantus, ssi, premeal insulin      > Constipation   >on bowel regimen    > Analgesia   > Continue:    > Acetaminophen 650 mg PO q 4 hr PRN for pain level less than 5/10    > Methocarbamol 500 mg PO q 8 hr    > Percocet 10/325 1 tab PO q 4 hr PRN for pain level greater than 4/10     > Diet:   > Specifications: Cardiac, diabetic consistent carb 1800 kcal, no concentrated sweets, low purine   > Solids (consistency): Regular    > Liquids (consistency):  Thin       D/w patient    Signed:        December 27, 2019

## 2019-12-28 NOTE — PROGRESS NOTES
SHIFT CHANGE NOTE FOR [facility]    Bedside and Verbal shift change report given to Edgar Trujillo LPN (oncoming nurse) by Daysi Lyons, RN (offgoing nurse). Report included the following information SBAR, Kardex, MAR and Recent Results.     Situation:   Code Status: Full Code   Reason for Admission: Spinal Thoracic laminectomy T 6-T 6510 ClassifEye Day: 12   Problem List:   Hospital Problems  Date Reviewed: 12/25/2019          Codes Class Noted POA    Chronic respiratory failure with hypoxia (HCC) (Chronic) ICD-10-CM: J96.11  ICD-9-CM: 518.83, 799.02  Unknown Yes    Overview Signed 12/16/2019 10:11 PM by Milo Khan MD     On home oxygen inhalation at 3 LPM via NC             Opioid dependence ([facility] [address] (Chronic) ICD-10-CM: F11.20  ICD-9-CM: 304.00  Unknown Yes    Overview Signed 12/16/2019 10:54 PM by Milo Khan MD     On Methadone             Acute blood loss as cause of postoperative anemia ICD-10-CM: D62  ICD-9-CM: 285.1  12/12/2019 Yes        Impaired mobility and ADLs ICD-10-CM: Z74.09  ICD-9-CM: 799.89  12/11/2019 Yes        Spinal cord compression ([facility] [address] ICD-10-CM: G95.20  ICD-9-CM: 336.9  12/11/2019 Yes        Compression fracture of body of thoracic vertebra ([facility] [address] ICD-10-CM: S22.000A  ICD-9-CM: 805.2  12/11/2019 Yes        * (Principal) Status post laminectomy with spinal fusion ICD-10-CM: Z98.1  ICD-9-CM: V45.4  12/11/2019 Yes    Overview Signed 12/16/2019 10:05 PM by Milo Khan MD     S/P T10, T9, T8 laminectomy; T7, T8, T9, T10, T11, T12 posterior thoracic fusion; segmental spinal instrumentation; K2M Davis type, T7-T8, T11-T12 (12/11/2019 - Dr. Harsha Amin)             History of fracture due to fall ICD-10-CM: Z87.81  ICD-9-CM: V15.51  12/11/2019 Yes        Hypoxemia requiring supplemental oxygen (Chronic) ICD-10-CM: R09.02, Z99.81  ICD-9-CM: 799.02  12/29/2014 Yes        Chronic obstructive pulmonary disease (COPD) (HCC) (Chronic) ICD-10-CM: J44.9  ICD-9-CM: 496  Unknown Yes    Overview Signed 4/23/2013 10:01 AM by Kim Stock     SOB, on paula O2  Recent admission with psychosis             Hypertensive heart disease without heart failure (Chronic) ICD-10-CM: I11.9  ICD-9-CM: 402.90  Unknown Yes    Overview Signed 4/23/2013 10:02 AM by Katya CALLES     Better controlled             Type 2 diabetes mellitus with diabetic neuropathy, with long-term current use of insulin ([facility] [address] (Chronic) ICD-10-CM: E11.40, Z79.4  ICD-9-CM: 250.60, 357.2, V58.67  Unknown Yes    Overview Signed 12/18/2019  2:13 PM by Jamey Griffin MD     HbA1c (12/11/2019) = 7.1                   Background:   Past Medical History:   Past Medical History:   Diagnosis Date    Abnormally low high density lipoprotein (HDL) cholesterol with hypertriglyceridemia     Lipid profile (11/6/2016) showed , , HDL 38, LDL 37    Acute blood loss as cause of postoperative anemia 12/12/2019    Anxiety     Bipolar affective disorder ([facility] [address] 12/5/2012    Cellulitis of right forearm 05/04/2017    Chronic back pain     Chronic obstructive pulmonary disease (COPD) (HCC)     SOB, on paula O2 Recent admission with psychosis     Chronic respiratory failure with hypoxia (HCC)     On home oxygen inhalation at 3 LPM via NC    Contusion of left elbow 5/4/2017    Depression     Diabetic neuropathy associated with type 2 diabetes mellitus (HCC)     Diastolic dysfunction without heart failure     Stable on diuretics     Falls frequently     Gastroesophageal reflux disease with hiatal hernia     Generalized osteoarthritis of multiple sites     Gout     On Allopurinol    History of acute renal failure 5/31/2013    History of back injury     jumped out of second story window     History of fracture due to fall 12/11/2019    History of hepatitis C     treated    History of penicillin allergy     History of vitamin D deficiency 5/10/2017    Hypertensive heart disease without heart failure     Better controlled     Hyperuricemia 5/26/2017    Hypothyroidism     Hypoxemia requiring supplemental oxygen 12/29/2014    Intravenous drug user 5/2/2017    Long term current use of aspirin     Memory difficulty     Menopause     Mixed connective tissue disease ([facility] [address] 5/10/2017    Obesity, Class I, BMI 30-34.9     Olecranon bursitis of right elbow 5/4/2017    Opio[facility] dependence ([facility]     On Methadone    Recurrent genital herpes 5/31/2013    Right Achilles tendinitis 5/2/2017    Sarcoidosis     Sickle cell trait ([facility]     Tobacco use disorder     Type 2 diabetes mellitus with diabetic neuropathy, with long-term current use of insulin ([facility])     HbA1c (12/11/2019) = 7.1    Urge urinary incontinence 5/10/2017    Venous insufficiency     Wears glasses       Patient taking anticoagulants no    Patient has a defibrillator: no     Assessment:   Changes in Assessment throughout shift: None     Patient has central line: no Reasons if yes: Removed 12/16/19  Insertion date:n/a Last dressing date:n/a   Patient has Renae Cath: no Reasons if yes: n/a   Insertion date:n/a     Last Vitals:     Vitals:    12/26/19 2234 12/27/19 0821 12/27/19 1617 12/27/19 2238   BP: 121/62 120/71 118/75 118/70   Pulse: 65 72 77 74   Resp: 18 18 18 18   Temp: 98 °F (36.7 °C) 98.1 °F (36.7 °C) 98.6 °F (37 °C) 97 °F (36.1 °C)   SpO2: 100% 100% 99% 100%   Weight:       LMP: 11/25/2012        PAIN    Pain Assessment    Pain Intensity 1: 8 (12/28/19 0600) Pain Intensity 1: 2 (12/29/14 1105)    Pain Location 1: Back Pain Location 1: Abdomen    Pain Intervention(s) 1: Medication (see MAR) Pain Intervention(s) 1: Medication (see MAR)  Patient Stated Pain Goal: 0 Patient Stated Pain Goal: 0  o Intervention effective: yes    o Other actions taken for pain: no c/o     Skin Assessment  Skin color Skin Color: Appropriate for ethnicity  Condition/Temperature Skin Condition/Temp: Dry, Warm  Integrity Skin Integrity: Incision (comment)  Turgor Turgor: Non-tenting  Weekly Pressure Ulcer Documentation  Pressure  Injury Documentation: No Pressure Injury Noted-Pressure Ulcer Prevention Initiated  Wound Prevention & Protection Methods  Orientation of wound Orientation of Wound Prevention: Posterior  Location of Prevention Location of Wound Prevention: Sacrum/Coccyx  Dressing Present Dressing Present : No  Dressing Status Dressing Status: Intact  Wound Offloading Wound Offloading (Prevention Methods): Bed, pressure redistribution/air, Repositioning     INTAKE/OUPUT  Date 12/27/19 0700 - 12/28/19 0659 12/28/19 0700 - 12/29/19 0659   Shift 0700-1859 1900-0659 24 Hour Total 0700-1859 1900-0659 24 Hour Total   INTAKE   Shift Total(mL/kg)         OUTPUT   Urine(mL/kg/hr)           Urine Occurrence(s) 1 x 2 x 3 x      Stool           Stool Occurrence(s) 0 x 0 x 0 x      Shift Total(mL/kg)         NET         Weight (kg) 42.9 42.9 42.9 42.9 42.9 42.9       Recommendations:  1. Patient needs and requests: met    2. Diet: Cardiac DM Reg /thin liq    3. Pending tests/procedures: none     4. Functional Level/Equipment: wheelchair    5. Estimated Discharge Date: TBD Posted on Whiteboard in Patients Room: yes     HEALS Safety Check    A safety check occurred in the patient's room between off going nurse and oncoming nurse listed above.     The safety check included the below items  Area Items   H  High Alert Medications - Verify all high alert medication drips (heparin, PCA, etc.)   E  Equipment - Suction is set up for ALL patients (with yanker)  - Red plugs utilized for all equipment (IV pumps, etc.)  - WOWs wiped down at end of shift.  - Room stocked with oxygen, suction, and other unit-specific supplies   A  Alarms - Bed alarm is set for fall risk patients  - Ensure chair alarm is in place and activated if patient is up in a chair   L  Lines - Check IV for any infiltration  - Renae bag is empty if patient has a Renae   - Tubing and IV bags are labeled   S  Safety   - Room is clean, patient is clean, and equipment is clean. - Hallways are clear from equipment besides carts. - Fall bracelet on for fall risk patients  - Ensure room is clear and free of clutter  - Suction is set up for ALL patients (with lashay)  - Hallways are clear from equipment besides carts.    - Isolation precautions followed, supplies available outside room, sign posted

## 2019-12-28 NOTE — PROGRESS NOTES
[facility] PHYSICAL REHABILITATION  [address] [place] [address], [address]     INPATIENT REHABILITATION  DAILY PROGRESS NOTE     Date: 12/28/2019    Name: Dell Wikc Age / Sex: 62 y.o. / female   CSN: [id number] MRN: [id number]   [facility] Date: 12/16/2019 Length of Stay: 12 days     Primary Rehab Diagnosis: Impaired Mobility and ADLs secondary to:  1. S/P T10, T9, T8 laminectomy; T7, T8, T9, T10, T11, T12 posterior thoracic fusion; segmental spinal instrumentation; K2M [place] type, T7-T8, T11-T12 (12/11/2019 - Dr. Lashaun Horan)  2. History of acute compression fractures of body of thoracic vertebrae (T9 and T10) due to fall      Subjective:     Patient seen and examined. Blood pressure controlled. Blood glucose fairly controlled. Patient's Complaint:   No significant medical complaints    Pain Control: stable, mild-to-moderate joint symptoms intermittently, reasonably well controlled by current meds      Objective:     Vital Signs:  Patient Vitals for the past 24 hrs:   BP Temp Pulse Resp SpO2   12/28/19 0749 112/62 98.5 °F (36.9 °C) 68 18 100 %   12/27/19 2238 118/70 97 °F (36.1 °C) 74 18 100 %   12/27/19 1617 118/75 98.6 °F (37 °C) 77 18 99 %        Physical Examination:  GENERAL SURVEY: Patient is awake, alert, oriented x 3, sitting comfortably on the chair, not in acute respiratory distress. HEENT: pale palpebral conjunctivae, anicteric sclerae, no nasoaural discharge, moist oral mucosa  NECK: supple, no jugular venous distention, no palpable lymph nodes  CHEST/LUNGS: symmetrical chest expansion, good air entry, clear breath sounds  HEART: adynamic precordium, good S1 S2, no S3, regular rhythm, no murmurs  ABDOMEN: obese, bowel sounds appreciated, soft, non-tender  EXTREMITIES: pale nailbeds, no edema, full and equal pulses, no calf tenderness   NEUROLOGICAL EXAM: The patient is awake, alert and oriented x3, able to answer questions fairly appropriately, able to follow 1 and 2 step commands. Able to tell time from the wall clock. Cranial nerves II-XII are grossly intact. No gross sensory deficit. Motor strength is 4-/5 on BUE, 2+/5 on the RLE (except for 1/5 on the right ankle), 2-/5 on the LLE (except for 2+/5 on the left knee).      Incision(s): healing well, clean, dry, and intact and serosanguineous       Current Medications:  Current Facility-Administered Medications   Medication Dose Route Frequency    insulin glargine (LANTUS) injection 25 Units  25 Units SubCUTAneous ACB    insulin lispro (HUMALOG) injection 4 Units  4 Units SubCUTAneous TIDAC    lubiPROStone (AMITIZA) capsule 24 mcg  24 mcg Oral DAILY WITH BREAKFAST    guaiFENesin ER (MUCINEX) tablet 600 mg  600 mg Oral Q12H    acetaminophen (TYLENOL) tablet 650 mg  650 mg Oral Q4H PRN    bisacodyl (DULCOLAX) tablet 10 mg  10 mg Oral Q48H PRN    ferrous sulfate tablet 325 mg  1 Tab Oral DAILY WITH BREAKFAST    ascorbic acid (vitamin C) (VITAMIN C) tablet 250 mg  250 mg Oral DAILY WITH BREAKFAST    insulin lispro (HUMALOG) injection   SubCUTAneous TIDAC    senna-docusate (PERICOLACE) 8.6-50 mg per tablet 2 Tab  2 Tab Oral PCD    methadone (DOLOPHINE) tablet 20 mg  20 mg Oral DAILY    oxyCODONE-acetaminophen (PERCOCET 10)  mg per tablet 1 Tab  1 Tab Oral Q4H PRN    predniSONE (DELTASONE) tablet 30 mg  30 mg Oral DAILY WITH BREAKFAST    famotidine (PEPCID) tablet 20 mg  20 mg Oral BID    rosuvastatin (CRESTOR) tablet 5 mg  5 mg Oral QHS    albuterol (PROVENTIL VENTOLIN) nebulizer solution 2.5 mg  2.5 mg Nebulization Q4H PRN    levothyroxine (SYNTHROID) tablet 75 mcg  75 mcg Oral 6am    cholecalciferol (VITAMIN D3) (400 Units /10 mcg) tablet 5 Tab  2,000 Units Oral DAILY    calcium citrate tablet 200 mg [elemental]  200 mg Oral BID    divalproex DR (DEPAKOTE) tablet 500 mg  500 mg Oral QHS    fluticasone-vilanterol (BREO ELLIPTA) 100mcg-25mcg/puff  1 Puff Inhalation DAILY    oxybutynin (DITROPAN) tablet 5 mg  5 mg Oral BID    allopurinol (ZYLOPRIM) tablet 100 mg  100 mg Oral DAILY    sertraline (ZOLOFT) tablet 50 mg  50 mg Oral DAILY    methocarbamol (ROBAXIN) tablet 500 mg  500 mg Oral Q8H    aspirin chewable tablet 81 mg  81 mg Oral DAILY WITH BREAKFAST    docusate sodium (COLACE) capsule 100 mg  100 mg Oral DAILY AFTER BREAKFAST       Allergies: Allergies   Allergen Reactions    Moxifloxacin Rash    Pcn [Penicillins] Swelling    Sulfa (Sulfonamide Antibiotics) Swelling       Lab/Data Review:  Recent Results (from the past 24 hour(s))   GLUCOSE, POC    Collection Time: 12/27/19  5:09 PM   Result Value Ref Range    Glucose (POC) 284 (H) 70 - 110 mg/dL   GLUCOSE, POC    Collection Time: 12/27/19  9:31 PM   Result Value Ref Range    Glucose (POC) 210 (H) 70 - 110 mg/dL   GLUCOSE, POC    Collection Time: 12/28/19  7:53 AM   Result Value Ref Range    Glucose (POC) 127 (H) 70 - 110 mg/dL   GLUCOSE, POC    Collection Time: 12/28/19 12:03 PM   Result Value Ref Range    Glucose (POC) 217 (H) 70 - 110 mg/dL       Estimated Glomerular Filtration Rate:  On admission, estimated GFR based on a Creatinine of 0.68 mg/dl:              Using CKD-EPI = 107.9 mL/min/1.73m2              Using MDRD = 112.4 mL/min/1.73m2  Most recent estimated GFR, based on a Creatinine of 0.79 mg/dl on 12/23/2019:   Using CKD-EPI = 92.3 mL/min/1.73m2   Using MDRD = 94.5 mL/min/1.73m2       Assessment:     Primary Rehabilitation Diagnosis  1. Impaired Mobility and ADLs  2. S/P T10, T9, T8 laminectomy; T7, T8, T9, T10, T11, T12 posterior thoracic fusion; segmental spinal instrumentation; K2M Davis type, T7-T8, T11-T12 (12/11/2019 - Dr. Ryan Valenzuela)  3.  History of acute compression fractures of body of thoracic vertebrae (T9 and T10) due to fall     Comorbidities   Diabetic neuropathy associated with type 2 diabetes mellitus (HCC) E11.40    Venous insufficiency I87.2    Obesity, Class I, BMI 30-34.9 E66.9    Chronic back pain M54.9, G89.29    Generalized osteoarthritis of multiple sites M15.9    Bipolar affective disorder  F31.9    Abnormally low high density lipoprotein (HDL) cholesterol with hypertriglyceridemia E78.6, A98.6    Diastolic dysfunction without heart failure I51.89    Chronic obstructive pulmonary disease (COPD)  J44.9    Hypertensive heart disease without heart failure I11.9    Depression F32.9    History of back injury Z87.828    Type 2 diabetes mellitus with diabetic neuropathy, with long-term current use of insulin  E11.40, Z79.4    Anxiety F41.9    Wears glasses Z97.3    History of hepatitis C Z86.19    Sickle cell trait D57.3    History of acute renal failure Z87.448    Hypothyroidism E03.9    Recurrent genital herpes A60.00    Sarcoidosis D86.9    Abscess of right arm L02.413    Hypoxemia requiring supplemental oxygen R09.02, Z99.81    Abnormal nuclear stress test R94.39    Cellulitis and abscess of hand L03.119, L02.519    Cellulitis and abscess of foot L03.119, L02.619    Cellulitis of arm, right L03. 113    Olecranon bursitis of right elbow M70.21    Contusion of left elbow S50. 02XA    Intravenous drug user F19.90    Right Achilles tendinitis M76.61    History of penicillin allergy Z88.0    Falls frequently R29.6    Gastroesophageal reflux disease with hiatal hernia K21.9, K44.9    Memory difficulty R41.3    Mixed connective tissue disease  M35.1    Hyperuricemia E79.0    History of vitamin D deficiency Z86.39    Urge urinary incontinence N39.41    Acute blood loss as cause of postoperative anemia D62    History of fracture due to fall Z87.81    Chronic respiratory failure with hypoxia  J96.11    Cellulitis of right forearm L03. 113    Gout M10.9    Menopause Z78.0    Long term current use of aspirin Z79.82    Opioid dependence  F11.20    Tobacco use disorder F17.200        Plan:     1. Justification for continued stay: Good progression towards established rehabilitation goals.     2. Medical Issues being followed closely:    [x]  Fall and safety precautions     []  Wound Care     [x]  Bowel and Bladder Function     [x]  Fluid Electrolyte and Nutrition Balance     []  Pain Control      3. Issues that 24 hour rehabilitation nursing is following:    [x]  Fall and safety precautions     []  Wound Care     [x]  Bowel and Bladder Function     [x]  Fluid Electrolyte and Nutrition Balance     []  Pain Control      [x]  Assistance with and education on in-room safety with transfers to and from the bed, wheelchair, toilet and shower. 4. Acute rehabilitation plan of care:    [x]  Continue current care and rehab. [x]  Physical Therapy           [x]  Occupational Therapy           []  Speech Therapy     []  Hold Rehab until further notice     5. Medications:    [x]  MAR Reviewed     [x]  Continue Present Medications     6. DVT Prophylaxis:      []  Lovenox     []  Unfractionated Heparin     []  Coumadin     []  NOAC     [x]  ROBERT Stockings     []  Sequential Compression Device     []  None     7. Orders:   > S/P T10, T9, T8 laminectomy; T7, T8, T9, T10, T11, T12 posterior thoracic fusion; segmental spinal instrumentation; K2M Davis type, T7-T8, T11-T12 (12/11/2019 - Dr. Amparo Dumont);  History of acute compression fractures of body of thoracic vertebrae (T9 and T10) due to fall   > Thoracic spinal precautions   > Continue:    > Calcium citrate 200 mg PO BID    > Cholecalciferol 2,000 units PO once daily    > Acute Postoperative Blood Loss Anemia   > Anemia work-up (12/13/2019) showed serum iron 11, TIBC 215, iron % saturation 5, ferritin 146   > Hgb/Hct (12/17/2019, on admission) = 7.4/23.1   > Reticulocyte count (12/17/2019) = 2.5   > Hgb/Hct (12/23/2019) = 8.5/26.7    > Continue:    > FeSO4 325 mg PO once daily with breakfast     > Ascorbic Acid 250 mg PO once daily with breakfast (to enhance the absorption of the FeSO4)     > 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 congestive heart failure   > Prior to admission, the patient stated she was on Metolazone 2.5 mg PO q Mon-Wed-Fri   > Metolazone was not given during the patient's hospital stay at [facility]    > Upon admission to the ARU, held Metolazone     > Chronic obstructive pulmonary disease; Chronic respiratory failure with hypoxemia requiring supplemental oxygen (3 LPM)   > Continue:    > Fluticasone-Vilanterol 100-25 1 puff inhaled once daily    > O2 inhalation at 3 LPM via nasal cannula continuous    > Opioid dependence   > Continue Methadone 20 mg PO once daily    > Type 2 diabetes mellitus with stage 3 chronic kidney disease   > HbA1c (12/11/2019) = 7.1   > On 12/22/2019:    > Increased Insulin glargine from 20 units to 22 units PO once daily before breakfast    > Started Insulin lispro 3 units SC TID AC    > On 12/24/2019, increased Insulin lispro from 3 units to 4 units SC TID AC    > On 12/25/2019, increased Insulin glargine from 22 units to 25 units PO once daily before breakfast   > Continue:    > Insulin glargine 25 units PO once daily before breakfast    > Insulin lispro 4 units SC TID AC     > Insulin lispro sliding scale SC TID AC only    > Cough   > On 12/18/2019, started Guaifenesin  mg PO q 12 hr   > Continue Guaifenesin  mg PO q 12 hr    > Constipation   > On 12/17/2019, started:    > Docusate sodium 100 mg PO once daily after breakfast    > PeriColace 2 tabs PO once daily after dinner   > On 12/22/2019, started Lubiprostone 24 mcg PO once daily with breakfast   > Continue:    > Docusate sodium 100 mg PO once daily after breakfast    > PeriColace 2 tabs PO once daily after dinner    > Lubiprostone 24 mcg PO once daily with breakfast     > Analgesia   > Continue:    > Acetaminophen 650 mg PO q 4 hr PRN for pain level less than 5/10    > Methocarbamol 500 mg PO q 8 hr    > Percocet 10/325 1 tab PO q 4 hr PRN for pain level greater than 4/10     > Diet:   > Specifications: Cardiac, diabetic consistent carb 1800 kcal, no concentrated sweets, low purine   > Solids (consistency): Regular    > Liquids (consistency): Thin       8. Patient's progress in rehabilitation and medical issues discussed with the patient. All questions answered to the best of my ability. Care plan discussed with patient and nurse.       Signed:    Min Bhat MD    December 28, 2019

## 2019-12-28 NOTE — PROGRESS NOTES
This RN, notified by therapy around 1500 that patient had used bedpan and not voided at that time. Per therapist, patient assisted in getting new brief on and once changed, had incontinent episode of urine in brief. Therapist notified this RN at time of episode and RN went to room to ask patient if she could be changed at this time. Patient refused to be changed stating she had a visitor on the way and didn't want to miss seeing them. Patient educated on risk of skin breakdown and infection if soiled brief remained on. Patient verbalized understanding, continued to refuse to be changed at this time. Around [phone], patient rang for assistance to be put back to bed and for brief and clothes to be changed at this time. No redness, skin breakdown noted to sacrum, coccyx area at this time. Call light within reach and patient reminded to ring for assistance to use bedpan or BSC at this time.

## 2019-12-28 NOTE — PROGRESS NOTES
SHIFT CHANGE NOTE FOR [facility]    Bedside and Verbal shift change report given to Shanda Gowers LPN (oncoming nurse) by Fernando Meadows LPN (offgoing nurse). Report included the following information SBAR, Kardex, MAR and Recent Results.     Situation:   Code Status: Full Code   Reason for Admission: Spinal Thoracic laminectomy T 6-T 6510 Eyewitness Surveillance Day: 12   Problem List:   Hospital Problems  Date Reviewed: 12/28/2019          Codes Class Noted POA    Chronic respiratory failure with hypoxia (HCC) (Chronic) ICD-10-CM: J96.11  ICD-9-CM: 518.83, 799.02  Unknown Yes    Overview Signed 12/16/2019 10:11 PM by Sebas Rosario MD     On home oxygen inhalation at 3 LPM via NC             Opioid dependence ([facility] (Chronic) ICD-10-CM: F11.20  ICD-9-CM: 304.00  Unknown Yes    Overview Signed 12/16/2019 10:54 PM by Sebas Rosario MD     On Methadone             Acute blood loss as cause of postoperative anemia ICD-10-CM: D62  ICD-9-CM: 285.1  12/12/2019 Yes        Impaired mobility and ADLs ICD-10-CM: Z74.09  ICD-9-CM: 799.89  12/11/2019 Yes        Spinal cord compression ([facility] ICD-10-CM: G95.20  ICD-9-CM: 336.9  12/11/2019 Yes        Compression fracture of body of thoracic vertebra ([facility] ICD-10-CM: S22.000A  ICD-9-CM: 805.2  12/11/2019 Yes        * (Principal) Status post laminectomy with spinal fusion ICD-10-CM: Z98.1  ICD-9-CM: V45.4  12/11/2019 Yes    Overview Signed 12/16/2019 10:05 PM by Sebas Rosario MD     S/P T10, T9, T8 laminectomy; T7, T8, T9, T10, T11, T12 posterior thoracic fusion; segmental spinal instrumentation; K2M [place] type, T7-T8, T11-T12 (12/11/2019 - Dr. Jennifer Arora)             History of fracture due to fall ICD-10-CM: Z87.81  ICD-9-CM: V15.51  12/11/2019 Yes        Hypoxemia requiring supplemental oxygen (Chronic) ICD-10-CM: R09.02, Z99.81  ICD-9-CM: 799.02  12/29/2014 Yes        Chronic obstructive pulmonary disease (COPD) (HCC) (Chronic) ICD-10-CM: J44.9  ICD-9-CM: 496  Unknown Yes    Overview Signed 4/23/2013 10:01 AM by Katey Alonso     SOB, on paula O2  Recent admission with psychosis             Hypertensive heart disease without heart failure (Chronic) ICD-10-CM: I11.9  ICD-9-CM: 402.90  Unknown Yes    Overview Signed 4/23/2013 10:02 AM by Dian CALLES     Better controlled             Type 2 diabetes mellitus with diabetic neuropathy, with long-term current use of insulin ([facility] (Chronic) ICD-10-CM: E11.40, Z79.4  ICD-9-CM: 250.60, 357.2, V58.67  Unknown Yes    Overview Signed 12/18/2019  2:13 PM by Blas Post MD     HbA1c (12/11/2019) = 7.1                   Background:   Past Medical History:   Past Medical History:   Diagnosis Date    Abnormally low high density lipoprotein (HDL) cholesterol with hypertriglyceridemia     Lipid profile (11/6/2016) showed , , HDL 38, LDL 37    Acute blood loss as cause of postoperative anemia 12/12/2019    Anxiety     Bipolar affective disorder ([facility] 12/5/2012    Cellulitis of right forearm 05/04/2017    Chronic back pain     Chronic obstructive pulmonary disease (COPD) (HCC)     SOB, on paula O2 Recent admission with psychosis     Chronic respiratory failure with hypoxia (HCC)     On home oxygen inhalation at 3 LPM via NC    Contusion of left elbow 5/4/2017    Depression     Diabetic neuropathy associated with type 2 diabetes mellitus (HCC)     Diastolic dysfunction without heart failure     Stable on diuretics     Falls frequently     Gastroesophageal reflux disease with hiatal hernia     Generalized osteoarthritis of multiple sites     Gout     On Allopurinol    History of acute renal failure 5/31/2013    History of back injury     jumped out of second story window     History of fracture due to fall 12/11/2019    History of hepatitis C     treated    History of penicillin allergy     History of vitamin D deficiency 5/10/2017    Hypertensive heart disease without heart failure     Better controlled     Hyperuricemia 5/26/2017    Hypothyroidism     Hypoxemia requiring supplemental oxygen 12/29/2014    Intravenous drug user 5/2/2017    Long term current use of aspirin     Memory difficulty     Menopause     Mixed connective tissue disease ([facility] 5/10/2017    Obesity, Class I, BMI 30-34.9     Olecranon bursitis of right elbow 5/4/2017    Opioid dependence ([facility]     On Methadone    Recurrent genital herpes 5/31/2013    Right Achilles tendinitis 5/2/2017    Sarcoidosis     Sickle cell trait ([facility]     Tobacco use disorder     Type 2 diabetes mellitus with diabetic neuropathy, with long-term current use of insulin ([facility])     HbA1c (12/11/2019) = 7.1    Urge urinary incontinence 5/10/2017    Venous insufficiency     Wears glasses       Patient taking anticoagulants no    Patient has a defibrillator: no     Assessment:   Changes in Assessment throughout shift: None     Patient has central line: no Reasons if yes: Removed 12/16/19  Insertion date:n/a Last dressing date:n/a   Patient has Renae Cath: no Reasons if yes: n/a   Insertion date:n/a     Last Vitals:     Vitals:    12/27/19 0821 12/27/19 1617 12/27/19 2238 12/28/19 0749   BP: 120/71 118/75 118/70 112/62   Pulse: 72 77 74 68   Resp: 18 18 18 18   Temp: 98.1 °F (36.7 °C) 98.6 °F (37 °C) 97 °F (36.1 °C) 98.5 °F (36.9 °C)   SpO2: 100% 99% 100% 100%   Weight:       LMP: 11/25/2012        PAIN    Pain Assessment    Pain Intensity 1: 0 (12/28/19 1200) Pain Intensity 1: 2 (12/29/14 1105)    Pain Location 1: Back Pain Location 1: Abdomen    Pain Intervention(s) 1: Medication (see MAR) Pain Intervention(s) 1: Medication (see MAR)  Patient Stated Pain Goal: 0 Patient Stated Pain Goal: 0  o Intervention effective: yes    o Other actions taken for pain: no c/o     Skin Assessment  Skin color Skin Color: Appropriate for ethnicity  Condition/Temperature Skin Condition/Temp: Dry, Warm  Integrity Skin Integrity: Incision (comment)  Turgor Turgor: Non-tenting  Weekly Pressure Ulcer Documentation  Pressure  Injury Documentation: No Pressure Injury Noted-Pressure Ulcer Prevention Initiated  Wound Prevention & Protection Methods  Orientation of wound Orientation of Wound Prevention: Posterior  Location of Prevention Location of Wound Prevention: Buttocks, Sacrum/Coccyx  Dressing Present Dressing Present : No  Dressing Status Dressing Status: Intact  Wound Offloading Wound Offloading (Prevention Methods): Bed, pressure redistribution/air     INTAKE/OUPUT  Date 12/27/19 0700 - 12/28/19 0659 12/28/19 0700 - 12/29/19 0659   Shift 0700-1859 1900-0659 24 Hour Total 0700-1859 1900-0659 24 Hour Total   INTAKE   Shift Total(mL/kg)         OUTPUT   Urine(mL/kg/hr)           Urine Occurrence(s) 1 x 2 x 3 x 1 x  1 x   Stool           Stool Occurrence(s) 0 x 0 x 0 x 0 x  0 x   Shift Total(mL/kg)         NET         Weight (kg) 42.9 42.9 42.9 42.9 42.9 42.9       Recommendations:  1. Patient needs and requests: met    2. Diet: Cardiac DM Reg /thin liq    3. Pending tests/procedures: none     4. Functional Level/Equipment: wheelchair    5. Estimated Discharge Date: TBD Posted on Whiteboard in Patients Room: yes     HEALS Safety Check    A safety check occurred in the patient's room between off going nurse and oncoming nurse listed above.     The safety check included the below items  Area Items   H  High Alert Medications - Verify all high alert medication drips (heparin, PCA, etc.)   E  Equipment - Suction is set up for ALL patients (with yanker)  - Red plugs utilized for all equipment (IV pumps, etc.)  - WOWs wiped down at end of shift.  - Room stocked with oxygen, suction, and other unit-specific supplies   A  Alarms - Bed alarm is set for fall risk patients  - Ensure chair alarm is in place and activated if patient is up in a chair   L  Lines - Check IV for any infiltration  - Renae bag is empty if patient has a Renae   - Tubing and IV bags are labeled   S  Safety   - Room is clean, patient is clean, and equipment is clean. - Hallways are clear from equipment besides carts. - Fall bracelet on for fall risk patients  - Ensure room is clear and free of clutter  - Suction is set up for ALL patients (with lashay)  - Hallways are clear from equipment besides carts.    - Isolation precautions followed, supplies available outside room, sign posted

## 2019-12-29 NOTE — PROGRESS NOTES
Problem: Diabetes Self-Management  Goal: *Disease process and treatment process  Description  Define diabetes and identify own type of diabetes; list 3 options for treating diabetes. Outcome: Progressing Towards Goal  Goal: *Incorporating nutritional management into lifestyle  Description  Describe effect of type, amount and timing of food on blood glucose; list 3 methods for planning meals. Outcome: Progressing Towards Goal  Goal: *Incorporating physical activity into lifestyle  Description  State effect of exercise on blood glucose levels. Outcome: Progressing Towards Goal  Goal: *Developing strategies to promote health/change behavior  Description  Define the ABC's of diabetes; identify appropriate screenings, schedule and personal plan for screenings. Outcome: Progressing Towards Goal  Goal: *Using medications safely  Description  State effect of diabetes medications on diabetes; name diabetes medication taking, action and side effects. Outcome: Progressing Towards Goal  Goal: *Monitoring blood glucose, interpreting and using results  Description  Identify recommended blood glucose targets  and personal targets. Outcome: Progressing Towards Goal  Goal: *Prevention, detection, treatment of acute complications  Description  List symptoms of hyper- and hypoglycemia; describe how to treat low blood sugar and actions for lowering  high blood glucose level. Outcome: Progressing Towards Goal  Goal: *Prevention, detection and treatment of chronic complications  Description  Define the natural course of diabetes and describe the relationship of blood glucose levels to long term complications of diabetes.   Outcome: Progressing Towards Goal  Goal: *Developing strategies to address psychosocial issues  Description  Describe feelings about living with diabetes; identify support needed and support network  Outcome: Progressing Towards Goal  Goal: *Insulin pump training  Outcome: Progressing Towards Goal  Goal: *Sick day guidelines  Outcome: Progressing Towards Goal  Goal: *Patient Specific Goal (EDIT GOAL, INSERT TEXT)  Outcome: Progressing Towards Goal     Problem: Patient Education: Go to Patient Education Activity  Goal: Patient/Family Education  Outcome: Progressing Towards Goal     Problem: Falls - Risk of  Goal: *Absence of Falls  Description  Document Clide Failing Fall Risk and appropriate interventions in the flowsheet. Outcome: Progressing Towards Goal  Note: Fall Risk Interventions:  Mobility Interventions: Bed/chair exit alarm, Patient to call before getting OOB    Mentation Interventions: Bed/chair exit alarm    Medication Interventions: Patient to call before getting OOB    Elimination Interventions: Bed/chair exit alarm, Call light in reach, Patient to call for help with toileting needs    History of Falls Interventions: Bed/chair exit alarm         Problem: Patient Education: Go to Patient Education Activity  Goal: Patient/Family Education  Outcome: Progressing Towards Goal     Problem: Pressure Injury - Risk of  Goal: *Prevention of pressure injury  Description  Document Florian Scale and appropriate interventions in the flowsheet.   Outcome: Progressing Towards Goal  Note: Pressure Injury Interventions:  Sensory Interventions: Assess changes in LOC    Moisture Interventions: Absorbent underpads    Activity Interventions: Increase time out of bed, Pressure redistribution bed/mattress(bed type)    Mobility Interventions: Pressure redistribution bed/mattress (bed type)    Nutrition Interventions: Document food/fluid/supplement intake    Friction and Shear Interventions: Minimize layers                Problem: Patient Education: Go to Patient Education Activity  Goal: Patient/Family Education  Outcome: Progressing Towards Goal     Problem: Infection - Risk of, Surgical Site Infection  Goal: *Absence of surgical site infection signs and symptoms  Outcome: Progressing Towards Goal     Problem: Patient Education: Go to Patient Education Activity  Goal: Patient/Family Education  Outcome: Progressing Towards Goal     Problem: Pain  Goal: *Control of Pain  Outcome: Progressing Towards Goal  Goal: *PALLIATIVE CARE:  Alleviation of Pain  Outcome: Progressing Towards Goal     Problem: Patient Education: Go to Patient Education Activity  Goal: Patient/Family Education  Outcome: Progressing Towards Goal     Problem: Nutrition Deficit  Goal: *Optimize nutritional status  Outcome: Progressing Towards Goal     Problem: Injury - Risk of, Adverse Drug Event  Goal: *Absence of adverse drug events  Outcome: Progressing Towards Goal  Goal: *Absence of medication errors  Outcome: Progressing Towards Goal  Goal: *Knowledge of prescribed medications  Outcome: Progressing Towards Goal     Problem: Patient Education: Go to Patient Education Activity  Goal: Patient/Family Education  Outcome: Progressing Towards Goal     Problem: Inpatient Rehab (Adult)  Goal: *LTG: Avoids injury/falls 100% of time related to deficits  Outcome: Progressing Towards Goal  Goal: *LTG: Avoids infection 100% of time related to deficits  Outcome: Progressing Towards Goal  Goal: *LTG: Verbalize understanding of diagnosis and risk factors for recurring stroke  Outcome: Progressing Towards Goal  Goal: *LTG: Absence of DVT during hospitalization  Outcome: Progressing Towards Goal  Goal: *LTG: Maintains Skin Integrity With No Evidence of Pressure Injury 100% of Time  Outcome: Progressing Towards Goal  Goal: Interventions  Outcome: Progressing Towards Goal     Problem: Patient Education: Go to Patient Education Activity  Goal: Patient/Family Education  Outcome: Progressing Towards Goal

## 2019-12-29 NOTE — PROGRESS NOTES
SHIFT CHANGE NOTE FOR [facility]    Bedside and Verbal shift change report given to [address] [place] [address] (oncoming nurse) by Giovany Barnett LPN (offgoing nurse). Report included the following information SBAR, Kardex, MAR and Recent Results.     Situation:   Code Status: Full Code   Reason for Admission: Spinal Thoracic laminectomy T 6-T [address] [employer] Day: 12   Problem List:   Hospital Problems  Date Reviewed: 12/28/2019          Codes Class Noted POA    Chronic respiratory failure with hypoxia (HCC) (Chronic) ICD-10-CM: J96.11  ICD-9-CM: 518.83, 799.02  Unknown Yes    Overview Signed 12/16/2019 10:11 PM by Mg Herring MD     On home oxygen inhalation at 3 LPM via [place]             Opioid dependence ([facility] (Chronic) ICD-10-CM: F11.20  ICD-9-CM: 304.00  Unknown Yes    Overview Signed 12/16/2019 10:54 PM by Mg Herring MD     On Methadone             Acute blood loss as cause of postoperative anemia ICD-10-CM: D62  ICD-9-CM: 285.1  12/12/2019 Yes        Impaired mobility and ADLs ICD-10-CM: Z74.09  ICD-9-CM: 799.89  12/11/2019 Yes        Spinal cord compression ([facility] ICD-10-CM: G95.20  ICD-9-CM: 336.9  12/11/2019 Yes        Compression fracture of body of thoracic vertebra ([facility] ICD-10-CM: S22.000A  ICD-9-CM: 805.2  12/11/2019 Yes        * (Principal) Status post laminectomy with spinal fusion ICD-10-CM: Z98.1  ICD-9-CM: V45.4  12/11/2019 Yes    Overview Signed 12/16/2019 10:05 PM by Mg Herring MD     S/P T10, T9, T8 laminectomy; T7, T8, T9, T10, T11, T12 posterior thoracic fusion; segmental spinal instrumentation; K2M Davis type, T7-T8, T11-T12 (12/11/2019 - Dr. Nuris Romero)             History of fracture due to fall ICD-10-CM: Z87.81  ICD-9-CM: V15.51  12/11/2019 Yes        Hypoxemia requiring supplemental oxygen (Chronic) ICD-10-CM: R09.02, Z99.81  ICD-9-CM: 799.02  12/29/2014 Yes        Chronic obstructive pulmonary disease (COPD) (HCC) (Chronic) ICD-10-CM: J44.9  ICD-9-CM: 496  Unknown Yes    Overview Signed 4/23/2013 10:01 AM by Lisandro Cameron     SOB, on paula O2  Recent admission with psychosis             Hypertensive heart disease without heart failure (Chronic) ICD-10-CM: I11.9  ICD-9-CM: 402.90  Unknown Yes    Overview Signed 4/23/2013 10:02 AM by Eva CALLES     Better controlled             Type 2 diabetes mellitus with diabetic neuropathy, with long-term current use of insulin ([facility] (Chronic) ICD-10-CM: E11.40, Z79.4  ICD-9-CM: 250.60, 357.2, V58.67  Unknown Yes    Overview Signed 12/18/2019  2:13 PM by Fabby Hickey MD     HbA1c (12/11/2019) = 7.1                   Background:   Past Medical History:   Past Medical History:   Diagnosis Date    Abnormally low high density lipoprotein (HDL) cholesterol with hypertriglyceridemia     Lipid profile (11/6/2016) showed , , HDL 38, LDL 37    Acute blood loss as cause of postoperative anemia 12/12/2019    Anxiety     Bipolar affective disorder ([facility] 12/5/2012    Cellulitis of right forearm 05/04/2017    Chronic back pain     Chronic obstructive pulmonary disease (COPD) (HCC)     SOB, on paula O2 Recent admission with psychosis     Chronic respiratory failure with hypoxia (HCC)     On home oxygen inhalation at 3 LPM via [place]    Contusion of left elbow 5/4/2017    Depression     Diabetic neuropathy associated with type 2 diabetes mellitus (HCC)     Diastolic dysfunction without heart failure     Stable on diuretics     Falls frequently     Gastroesophageal reflux disease with hiatal hernia     Generalized osteoarthritis of multiple sites     Gout     On Allopurinol    History of acute renal failure 5/31/2013    History of back injury     jumped out of second story window     History of fracture due to fall 12/11/2019    History of hepatitis C     treated    History of penicillin allergy     History of vitamin D deficiency 5/10/2017    Hypertensive heart disease without heart failure     Better controlled     Hyperuricemia 5/26/2017    Hypothyroidism     Hypoxemia requiring supplemental oxygen 12/29/2014    Intravenous drug user 5/2/2017    Long term current use of aspirin     Memory difficulty     Menopause     Mixed connective tissue disease ([facility] 5/10/2017    Obesity, Class I, BMI 30-34.9     Olecranon bursitis of right elbow 5/4/2017    [facility]     On Methadone    Recurrent genital herpes 5/31/2013    Right Achilles tendinitis 5/2/2017    Sarcoidosis     Sickle cell trait ([facility]     Tobacco use disorder     Type 2 diabetes mellitus with diabetic neuropathy, with long-term current use of insulin ([facility])     HbA1c (12/11/2019) = 7.1    Urge urinary incontinence 5/10/2017    Venous insufficiency     Wears glasses       Patient taking anticoagulants no    Patient has a defibrillator: no     Assessment:   Changes in Assessment throughout shift: None     Patient has central line: no Reasons if yes: Removed 12/16/19  Insertion date:n/a Last dressing date:n/a   Patient has Renae Cath: no Reasons if yes: n/a   Insertion date:n/a     Last Vitals:     Vitals:    12/27/19 1617 12/27/19 2238 12/28/19 0749 12/28/19 1554   BP: 118/75 118/70 112/62 122/70   Pulse: 77 74 68 84   Resp: 18 18 18 19   Temp: 98.6 °F (37 °C) 97 °F (36.1 °C) 98.5 °F (36.9 °C) 98.4 °F (36.9 °C)   SpO2: 99% 100% 100% 98%   Weight:       LMP: 11/25/2012        PAIN    Pain Assessment    Pain Intensity 1: 3 (12/28/19 1944) Pain Intensity 1: 2 (12/29/14 1105)    Pain Location 1: Back Pain Location 1: Abdomen    Pain Intervention(s) 1: Medication (see MAR) Pain Intervention(s) 1: Medication (see MAR)  Patient Stated Pain Goal: 0 Patient Stated Pain Goal: 0  o Intervention effective: yes    o Other actions taken for pain: no c/o     Skin Assessment  Skin color Skin Color: Appropriate for ethnicity  Condition/Temperature Skin Condition/Temp: Dry, Warm  Integrity Skin Integrity: Incision (comment)  Turgor Turgor: Non-tenting  Weekly Pressure Ulcer Documentation  Pressure  Injury Documentation: No Pressure Injury Noted-Pressure Ulcer Prevention Initiated  Wound Prevention & Protection Methods  Orientation of wound Orientation of Wound Prevention: Posterior  Location of Prevention Location of Wound Prevention: Buttocks, Sacrum/Coccyx  Dressing Present Dressing Present : No  Dressing Status Dressing Status: Intact  Wound Offloading Wound Offloading (Prevention Methods): Bed, pressure redistribution/air     INTAKE/OUPUT  Date 12/27/19 1900 - 12/28/19 0659 12/28/19 0700 - 12/29/19 0659   Shift 8223-2853 24 Hour Total 7031-7426 6254-7648 24 Hour Total   INTAKE   Shift Total(mL/kg)        OUTPUT   Urine(mL/kg/hr)          Urine Occurrence(s) 2 x 3 x 2 x  2 x   Stool          Stool Occurrence(s) 0 x 0 x 0 x  0 x   Shift Total(mL/kg)        NET        Weight (kg) 42.9 42.9 42.9 42.9 42.9       Recommendations:  1. Patient needs and requests: met    2. Diet: Cardiac DM Reg /thin liq    3. Pending tests/procedures: none     4. Functional Level/Equipment: wheelchair    5. Estimated Discharge Date: TBD Posted on Whiteboard in Patients Room: yes     HEALS Safety Check    A safety check occurred in the patient's room between off going nurse and oncoming nurse listed above.     The safety check included the below items  Area Items   H  High Alert Medications - Verify all high alert medication drips (heparin, PCA, etc.)   E  Equipment - Suction is set up for ALL patients (with yanker)  - Red plugs utilized for all equipment (IV pumps, etc.)  - WOWs wiped down at end of shift.  - Room stocked with oxygen, suction, and other unit-specific supplies   A  Alarms - Bed alarm is set for fall risk patients  - Ensure chair alarm is in place and activated if patient is up in a chair   L  Lines - Check IV for any infiltration  - Renae bag is empty if patient has a Renae   - Tubing and IV bags are labeled   S  Safety   - Room is clean, patient is clean, and equipment is clean.  - Hallways are clear from equipment besides carts. - Fall bracelet on for fall risk patients  - Ensure room is clear and free of clutter  - Suction is set up for ALL patients (with lashay)  - Hallways are clear from equipment besides carts.    - Isolation precautions followed, supplies available outside room, sign posted

## 2019-12-29 NOTE — PROGRESS NOTES
Problem: Mobility Impaired (Adult and Pediatric)  Goal: *Therapy Goal (Edit Goal, Insert Text)  Description  Physical Therapy Goals: STG  Initiated 12/17/2019, re-assessed 12/24/19 and to be accomplished within 7 day(s) on 12/31/2019:  1. Patient will move from supine to sit and sit to supine , scoot up and down and roll side to side in bed with moderate assistance .   (mod/maxA)  2. Patient will transfer from bed to chair and chair to bed with maximal assistance with one person assist using the least restrictive device. (MET with slide board)  3. Patient will perform sit to stand with maximal assistance with one person assist using appropriate AD. (unable at this time)  4. Patient will perform static sitting balance without UE support for at least 2 minutes, demonstrating appropriate upright and midline posture at supervision level for ease of preparation for transfers. (CGA)  5. Patient will perform wheelchair mobility moderate assist for 150 ft distance over even surfaces. (MET)    Physical Therapy Goals: LTG  Initiated 12/17/2019 and to be accomplished within 28 day(s) on 01/14/2019:   1. Patient will move from supine to sit and sit to supine , scoot up and down and roll side to side in bed with supervision/set-up. 2.  Patient will transfer from bed to chair and chair to bed with contact guard assist/stand-by assist using the least restrictive device. 3.  Patient will perform sit to stand with minimal assistance/contact guard assist.  4.  Patient will participate in gait assessment if and when appropriate. 5.  Patient will perform wheelchair mobility over even surfaces at supervision level for at least 150 ft, including negotiation of doorways. 6.  Patient will perform wheelchair mobility up/down ramp, uneven sidewalk min A level.         Outcome: Progressing Towards Goal   PHYSICAL THERAPY TREATMENT    Patient: Murray Sinha (65 y.o. female)  Date: 12/29/2019  Diagnosis: Status post laminectomy with spinal fusion [Z98.1] Status post laminectomy with spinal fusion  Precautions:    Chart, physical therapy assessment, plan of care and goals were reviewed. Time In: 930  Time Out: 1030  Patient Seen For: Balance activities; Patient education; Therapeutic exercise;Transfer training; Wheelchair mobility  Pain:  Pt pain was reported as  8/10 vack pre-treatment. Pt pain was reported as 8/10 back post-treatment. Intervention: repositioning, pain medication, nursing informed    Patient identified with name and :yes    SUBJECTIVE:     Pt reports \"around my incision (pain is located)\"    OBJECTIVE DATA SUMMARY:   Objective:    BED/MAT MOBILITY Daily Assessment     Not performed with PT this morning       TRANSFERS Daily Assessment    Transfer Type: Lateral with transfer board  Transfer Assistance : 3 (Moderate assistance )(cues and assist for set up; assist for bottom clear; move LE)   With tactile cues for quads, pt able to clear bottom better with lateral sliding board transfer. BALANCE Daily Assessment    Sitting - Static: Fair (occasional)  Sitting - Dynamic: Fair (occasional)(minus)   Sitting balance on mat table >20 minutes at times needing UE assistance. Performing lateral and forward/posterior weight shifts. Pt having 1 LOB with pt recovery. WHEELCHAIR MOBILITY Daily Assessment    Wheelchair Setup Assist Required : 4 (Minimal assistance)  Wheelchair Management: (needs assist for foot rests)       THERAPEUTIC EXERCISES Daily Assessment    Extremity: Both  Exercise Type #1: Seated lower extremity strengthening(marching (max A), LAQ min A, hip abd (yellow), add, HS)  Sets Performed: 2  Reps Performed: 10       ASSESSMENT:  Pt consents to therapy. Pt reports increased back pain and received medication during session. Pt is very talkative throughout session. She is improving with sitting balance activities but still has decreased dynamic sitting balance. Increased therex to 2nd set today.  Pt is able to perform LE therex better sitting in WC vs the mat table. Pt ended therapy sitting in [facility] with alarm donned and all needs met in her room. Possible use of standing frame this week to increase activation of B LE. Progression toward goals:  []      Improving appropriately and progressing toward goals  [x]      Improving slowly and progressing toward goals  []      Not making progress toward goals and plan of care will be adjusted     PLAN:  Patient continues to benefit from skilled intervention to address the above impairments. Continue treatment per established plan of care. Discharge Recommendations:  SNF vs home with 24 assistance  Further Equipment Recommendations for Discharge:  yareli-height WC, cushion, and sliding transfer board     Estimated LOS:4 weeks    Activity Tolerance:   fair  Please refer to the flowsheet for vital signs taken during this treatment. After treatment:   Patient left sitting in WC in her room with alarm donned.        Angie Martínez, PT  12/29/2019

## 2019-12-29 NOTE — PROGRESS NOTES
SHIFT CHANGE NOTE FOR [facility]    Bedside and Verbal shift change report given to Riri Sorenson LPN (oncoming nurse) by Sumit Bonilla, RN (offgoing nurse). Report included the following information SBAR, Kardex, MAR and Recent Results.     Situation:   Code Status: Full Code   Reason for Admission: Spinal Thoracic laminectomy T 6-T [address] Day: 13   Problem List:   Hospital Problems  Date Reviewed: 12/29/2019          Codes Class Noted POA    Chronic respiratory failure with hypoxia (HCC) (Chronic) ICD-10-CM: J96.11  ICD-9-CM: 518.83, 799.02  Unknown Yes    Overview Signed 12/16/2019 10:11 PM by Elder Rucker MD     On home oxygen inhalation at 3 LPM via NC             Opioid dependence ([facility] (Chronic) ICD-10-CM: F11.20  ICD-9-CM: 304.00  Unknown Yes    Overview Signed 12/16/2019 10:54 PM by Elder Rucker MD     On Methadone             Acute blood loss as cause of postoperative anemia ICD-10-CM: D62  ICD-9-CM: 285.1  12/12/2019 Yes        Impaired mobility and ADLs ICD-10-CM: Z74.09  ICD-9-CM: 799.89  12/11/2019 Yes        Spinal cord compression ([facility] ICD-10-CM: G95.20  ICD-9-CM: 336.9  12/11/2019 Yes        Compression fracture of body of thoracic vertebra (HCC) ICD-10-CM: S22.000A  ICD-9-CM: 805.2  12/11/2019 Yes        * (Principal) Status post laminectomy with spinal fusion ICD-10-CM: Z98.1  ICD-9-CM: V45.4  12/11/2019 Yes    Overview Signed 12/16/2019 10:05 PM by Elder Rucker MD     S/P T10, T9, T8 laminectomy; T7, T8, T9, T10, T11, T12 posterior thoracic fusion; segmental spinal instrumentation; K2M Davis type, T7-T8, T11-T12 (12/11/2019 - Dr. Hyacinth Medina)             History of fracture due to fall ICD-10-CM: Z87.81  ICD-9-CM: V15.51  12/11/2019 Yes        Hypoxemia requiring supplemental oxygen (Chronic) ICD-10-CM: R09.02, Z99.81  ICD-9-CM: 799.02  12/29/2014 Yes        Chronic obstructive pulmonary disease (COPD) (HCC) (Chronic) ICD-10-CM: J44.9  ICD-9-CM: 496  Unknown Yes    Overview Signed 4/23/2013 10:01 AM by Neo Alfredo     SOB, on paula O2  Recent admission with psychosis             Hypertensive heart disease without heart failure (Chronic) ICD-10-CM: I11.9  ICD-9-CM: 402.90  Unknown Yes    Overview Signed 4/23/2013 10:02 AM by Prakash CALLES     Better controlled             Type 2 diabetes mellitus with diabetic neuropathy, with long-term current use of insulin ([facility] (Chronic) ICD-10-CM: E11.40, Z79.4  ICD-9-CM: 250.60, 357.2, V58.67  Unknown Yes    Overview Signed 12/18/2019  2:13 PM by Alfredo Pantoja MD     HbA1c (12/11/2019) = 7.1                   Background:   Past Medical History:   Past Medical History:   Diagnosis Date    Abnormally low high density lipoprotein (HDL) cholesterol with hypertriglyceridemia     Lipid profile (11/6/2016) showed , , HDL 38, LDL 37    Acute blood loss as cause of postoperative anemia 12/12/2019    Anxiety     Bipolar affective disorder ([facility] 12/5/2012    Cellulitis of right forearm 05/04/2017    Chronic back pain     Chronic obstructive pulmonary disease (COPD) (HCC)     SOB, on paula O2 Recent admission with psychosis     Chronic respiratory failure with hypoxia (HCC)     On home oxygen inhalation at 3 LPM via NC    Contusion of left elbow 5/4/2017    Depression     Diabetic neuropathy associated with type 2 diabetes mellitus (HCC)     Diastolic dysfunction without heart failure     Stable on diuretics     Falls frequently     Gastroesophageal reflux disease with hiatal hernia     Generalized osteoarthritis of multiple sites     Gout     On Allopurinol    History of acute renal failure 5/31/2013    History of back injury     jumped out of second story window     History of fracture due to fall 12/11/2019    History of hepatitis C     treated    History of penicillin allergy     History of vitamin D deficiency 5/10/2017    Hypertensive heart disease without heart failure     Better controlled     Hyperuricemia 5/26/2017    Hypothyroidism     Hypoxemia requiring supplemental oxygen 12/29/2014    Intravenous drug user 5/2/2017    Long term current use of aspirin     Memory difficulty     Menopause     Mixed connective tissue disease ([facility] [address] 5/10/2017    Obesity, Class I, BMI 30-34.9     Olecranon bursitis of right elbow 5/4/2017    Opioid dependence ([facility]     On Methadone    Recurrent genital herpes 5/31/2013    Right Achilles tendinitis 5/2/2017    Sarcoidosis     Sickle cell trait ([facility]     Tobacco use disorder     Type 2 diabetes mellitus with diabetic neuropathy, with long-term current use of insulin ([facility])     HbA1c (12/11/2019) = 7.1    Urge urinary incontinence 5/10/2017    Venous insufficiency     Wears glasses       Patient taking anticoagulants no    Patient has a defibrillator: no     Assessment:   Changes in Assessment throughout shift: None     Patient has central line: no Reasons if yes: Removed 12/16/19  Insertion date:n/a Last dressing date:n/a   Patient has Renae Cath: no Reasons if yes: n/a   Insertion date:n/a     Last Vitals:     Vitals:    12/28/19 1554 12/28/19 2049 12/29/19 0802 12/29/19 1549   BP: 122/70 120/75 123/66 110/68   Pulse: 84 75 71 79   Resp: 19 17 18 18   Temp: 98.4 °F (36.9 °C) 98.3 °F (36.8 °C) 97.6 °F (36.4 °C) 97.9 °F (36.6 °C)   SpO2: 98% 98% 100% 99%   Weight:       LMP: 11/25/2012        PAIN    Pain Assessment    Pain Intensity 1: 5 (12/29/19 1618) Pain Intensity 1: 2 (12/29/14 1105)    Pain Location 1: Back Pain Location 1: Abdomen    Pain Intervention(s) 1: Medication (see MAR) Pain Intervention(s) 1: Medication (see MAR)  Patient Stated Pain Goal: 0 Patient Stated Pain Goal: 0  o Intervention effective: yes    o Other actions taken for pain: no c/o     Skin Assessment  Skin color Skin Color: Appropriate for ethnicity  Condition/Temperature Skin Condition/Temp: Dry, Warm  Integrity Skin Integrity: Incision (comment)  Turgor Turgor: Non-tenting  Weekly Pressure Ulcer Documentation  Pressure  Injury Documentation: No Pressure Injury Noted-Pressure Ulcer Prevention Initiated  Wound Prevention & Protection Methods  Orientation of wound Orientation of Wound Prevention: Posterior  Location of Prevention Location of Wound Prevention: Buttocks, Sacrum/Coccyx  Dressing Present Dressing Present : No  Dressing Status Dressing Status: Intact  Wound Offloading Wound Offloading (Prevention Methods): Bed, pressure redistribution/air     INTAKE/OUPUT  Date 12/28/19 0700 - 12/29/19 0659 12/29/19 0700 - 12/30/19 0659   Shift 0700-1859 1900-0659 24 Hour Total 0700-1859 1900-0659 24 Hour Total   INTAKE   Shift Total(mL/kg)         OUTPUT   Urine(mL/kg/hr)           Urine Occurrence(s) 2 x 3 x 5 x 1 x  1 x   Stool           Stool Occurrence(s) 0 x 1 x 1 x 1 x  1 x   Shift Total(mL/kg)         NET         Weight (kg) 42.9 42.9 42.9 42.9 42.9 42.9       Recommendations:  1. Patient needs and requests: met    2. Diet: Cardiac DM Reg /thin liq    3. Pending tests/procedures: none     4. Functional Level/Equipment: wheelchair    5. Estimated Discharge Date: TBD Posted on Whiteboard in Patients Room: yes     HEALS Safety Check    A safety check occurred in the patient's room between off going nurse and oncoming nurse listed above.     The safety check included the below items  Area Items   H  High Alert Medications - Verify all high alert medication drips (heparin, PCA, etc.)   E  Equipment - Suction is set up for ALL patients (with yanker)  - Red plugs utilized for all equipment (IV pumps, etc.)  - WOWs wiped down at end of shift.  - Room stocked with oxygen, suction, and other unit-specific supplies   A  Alarms - Bed alarm is set for fall risk patients  - Ensure chair alarm is in place and activated if patient is up in a chair   L  Lines - Check IV for any infiltration  - Renae bag is empty if patient has a Renae   - Tubing and IV bags are labeled   S  Safety   - Room is clean, patient is clean, and equipment is clean. - Hallways are clear from equipment besides carts. - Fall bracelet on for fall risk patients  - Ensure room is clear and free of clutter  - Suction is set up for ALL patients (with lashay)  - Hallways are clear from equipment besides carts.    - Isolation precautions followed, supplies available outside room, sign posted

## 2019-12-29 NOTE — PROGRESS NOTES
Problem: Mobility Impaired (Adult and Pediatric)  Goal: *Therapy Goal (Edit Goal, Insert Text)  Description  Physical Therapy Goals: STG  Initiated 12/17/2019, re-assessed 12/24/19 and to be accomplished within 7 day(s) on 12/31/2019:  1. Patient will move from supine to sit and sit to supine , scoot up and down and roll side to side in bed with moderate assistance .   (mod/maxA)  2. Patient will transfer from bed to chair and chair to bed with maximal assistance with one person assist using the least restrictive device. (MET with slide board)  3. Patient will perform sit to stand with maximal assistance with one person assist using appropriate AD. (unable at this time)  4. Patient will perform static sitting balance without UE support for at least 2 minutes, demonstrating appropriate upright and midline posture at supervision level for ease of preparation for transfers. (CGA)  5. Patient will perform wheelchair mobility moderate assist for 150 ft distance over even surfaces. (MET)    Physical Therapy Goals: LTG  Initiated 12/17/2019 and to be accomplished within 28 day(s) on 01/14/2019:   1. Patient will move from supine to sit and sit to supine , scoot up and down and roll side to side in bed with supervision/set-up. 2.  Patient will transfer from bed to chair and chair to bed with contact guard assist/stand-by assist using the least restrictive device. 3.  Patient will perform sit to stand with minimal assistance/contact guard assist.  4.  Patient will participate in gait assessment if and when appropriate. 5.  Patient will perform wheelchair mobility over even surfaces at supervision level for at least 150 ft, including negotiation of doorways. 6.  Patient will perform wheelchair mobility up/down ramp, uneven sidewalk min A level.         Outcome: Progressing Towards Goal   PHYSICAL THERAPY TREATMENT    Patient: Artemio Sinha (65 y.o. female)  Date: 12/29/2019  Diagnosis: Status post laminectomy with spinal fusion [Z98.1] Status post laminectomy with spinal fusion  Precautions:  fall risk, spinal precautions  Chart, physical therapy assessment, plan of care and goals were reviewed. Time In: 1420  Time Out: 1450  Patient Seen For: Wheelchair mobility;Transfer training(bed mobility)  Pain:  Pt pain was reported as  not reported pre-treatment. Pt pain was reported as  not reported  post-treatment. Intervention: NA    Patient identified with name and : yes     SUBJECTIVE:     Pt agreeable to participate in PT. Session started late due to pt toileting with nursing. OBJECTIVE DATA SUMMARY:   Objective:     BED/MAT MOBILITY Daily Assessment    Rolling Right : 3 (Moderate assistance )  Rolling Left : 3 (Moderate assistance )  Supine to Sit : 2 (Maximal assistance)  Sit to Supine : 2 (Maximal assistance)  Pt performed bed mobility on left side of mat table. Pt required max assist with BLE for sit to left sidelying to supine. Pt required mod assist with BLE for rolling side to side. Pt required max assist with BLE to clear EOM and mod assist at trunk for left sidelying to sitting. TRANSFERS Daily Assessment    Transfer Type: Lateral with transfer board  Transfer Assistance : 3 (Moderate assistance )  Pt performed slide board txfr w/c<->mat table with mod assist for board placement, sacral clearance and BLE placement. BALANCE Daily Assessment    Sitting - Static: Fair (occasional)  Sitting - Dynamic: Fair (occasional)       WHEELCHAIR MOBILITY Daily Assessment    Able to Propel (ft): 220 feet  Functional Level: 5  Curbs/Ramps Assist Required (FIM Score): 0 (Not tested)  Wheelchair Setup Assist Required : 3 (Moderate assistance)  Wheelchair Management: Manages left brake;Manages right brake  Pt propelled w/c from dining room to gym with BUE and S, with therapist managing portable o2 tank. ASSESSMENT:  Pt continues to demo significant BLE weakness with bed mobility and slide board txfrs. Progression toward goals:  []      Improving appropriately and progressing toward goals  []      Improving slowly and progressing toward goals  []      Not making progress toward goals and plan of care will be adjusted     PLAN:  Patient continues to benefit from skilled intervention to address the above impairments. Continue treatment per established plan of care. Discharge Recommendations:  Home Health with 24 hour care vs. [facility]  Further Equipment Recommendations for Discharge:  wheelchair 18 inch, w/c ramp and slide board     Estimated LOS: 1/14/20    Activity Tolerance:   fair  Please refer to the flowsheet for vital signs taken during this treatment. After treatment:   Patient left sitting in w/c in room with call coffey in reach.        Taj Schwartz, PTA  12/29/2019

## 2019-12-29 NOTE — PROGRESS NOTES
[facility] PHYSICAL REHABILITATION  [address] [place] [address], [address]     INPATIENT REHABILITATION  DAILY PROGRESS NOTE     Date: 12/29/2019    Name: Ju Gonzalez Age / Sex: 62 y.o. / female   CSN: [id number] MRN: [id number]   [facility] Date: 12/16/2019 Length of Stay: 13 days     Primary Rehab Diagnosis: Impaired Mobility and ADLs secondary to:  1. S/P T10, T9, T8 laminectomy; T7, T8, T9, T10, T11, T12 posterior thoracic fusion; segmental spinal instrumentation; K2M Davis type, T7-T8, T11-T12 (12/11/2019 - Dr. Steven Saba)  2. History of acute compression fractures of body of thoracic vertebrae (T9 and T10) due to fall      Subjective:     No new issues or problems reported. Blood pressure controlled. Blood glucose controlled.       Objective:     Vital Signs:  Patient Vitals for the past 24 hrs:   BP Temp Pulse Resp SpO2   12/29/19 0802 123/66 97.6 °F (36.4 °C) 71 18 100 %   12/28/19 2049 120/75 98.3 °F (36.8 °C) 75 17 98 %   12/28/19 1554 122/70 98.4 °F (36.9 °C) 84 19 98 %        Current Medications:  Current Facility-Administered Medications   Medication Dose Route Frequency    insulin glargine (LANTUS) injection 25 Units  25 Units SubCUTAneous ACB    insulin lispro (HUMALOG) injection 4 Units  4 Units SubCUTAneous TIDAC    lubiPROStone (AMITIZA) capsule 24 mcg  24 mcg Oral DAILY WITH BREAKFAST    guaiFENesin ER (MUCINEX) tablet 600 mg  600 mg Oral Q12H    acetaminophen (TYLENOL) tablet 650 mg  650 mg Oral Q4H PRN    bisacodyl (DULCOLAX) tablet 10 mg  10 mg Oral Q48H PRN    ferrous sulfate tablet 325 mg  1 Tab Oral DAILY WITH BREAKFAST    ascorbic acid (vitamin C) (VITAMIN C) tablet 250 mg  250 mg Oral DAILY WITH BREAKFAST    insulin lispro (HUMALOG) injection   SubCUTAneous TIDAC    senna-docusate (PERICOLACE) 8.6-50 mg per tablet 2 Tab  2 Tab Oral PCD    methadone (DOLOPHINE) tablet 20 mg  20 mg Oral DAILY    oxyCODONE-acetaminophen (PERCOCET 10)  mg per tablet 1 Tab 1 Tab Oral Q4H PRN    predniSONE (DELTASONE) tablet 30 mg  30 mg Oral DAILY WITH BREAKFAST    famotidine (PEPCID) tablet 20 mg  20 mg Oral BID    rosuvastatin (CRESTOR) tablet 5 mg  5 mg Oral QHS    albuterol (PROVENTIL VENTOLIN) nebulizer solution 2.5 mg  2.5 mg Nebulization Q4H PRN    levothyroxine (SYNTHROID) tablet 75 mcg  75 mcg Oral 6am    cholecalciferol (VITAMIN D3) (400 Units /10 mcg) tablet 5 Tab  2,000 Units Oral DAILY    calcium citrate tablet 200 mg [elemental]  200 mg Oral BID    divalproex DR (DEPAKOTE) tablet 500 mg  500 mg Oral QHS    fluticasone-vilanterol (BREO ELLIPTA) 100mcg-25mcg/puff  1 Puff Inhalation DAILY    oxybutynin (DITROPAN) tablet 5 mg  5 mg Oral BID    allopurinol (ZYLOPRIM) tablet 100 mg  100 mg Oral DAILY    sertraline (ZOLOFT) tablet 50 mg  50 mg Oral DAILY    methocarbamol (ROBAXIN) tablet 500 mg  500 mg Oral Q8H    aspirin chewable tablet 81 mg  81 mg Oral DAILY WITH BREAKFAST    docusate sodium (COLACE) capsule 100 mg  100 mg Oral DAILY AFTER BREAKFAST       Allergies:   Allergies   Allergen Reactions    Moxifloxacin Rash    Pcn [Penicillins] Swelling    Sulfa (Sulfonamide Antibiotics) Swelling       Lab/Data Review:  Recent Results (from the past 24 hour(s))   GLUCOSE, POC    Collection Time: 12/28/19  4:46 PM   Result Value Ref Range    Glucose (POC) 232 (H) 70 - 110 mg/dL   GLUCOSE, POC    Collection Time: 12/28/19  8:49 PM   Result Value Ref Range    Glucose (POC) 193 (H) 70 - 110 mg/dL   GLUCOSE, POC    Collection Time: 12/29/19  7:43 AM   Result Value Ref Range    Glucose (POC) 122 (H) 70 - 110 mg/dL   GLUCOSE, POC    Collection Time: 12/29/19 11:58 AM   Result Value Ref Range    Glucose (POC) 131 (H) 70 - 110 mg/dL       Estimated Glomerular Filtration Rate:  On admission, estimated GFR based on a Creatinine of 0.68 mg/dl:              Using CKD-EPI = 107.9 mL/min/1.73m2              Using MDRD = 112.4 mL/min/1.73m2  Most recent estimated GFR, based on a Creatinine of 0.79 mg/dl on 12/23/2019:   Using CKD-EPI = 92.3 mL/min/1.73m2   Using MDRD = 94.5 mL/min/1.73m2       Assessment:     Primary Rehabilitation Diagnosis  1. Impaired Mobility and ADLs  2. S/P T10, T9, T8 laminectomy; T7, T8, T9, T10, T11, T12 posterior thoracic fusion; segmental spinal instrumentation; K2M Davis type, T7-T8, T11-T12 (12/11/2019 - Dr. Annie Hodgson)  3. History of acute compression fractures of body of thoracic vertebrae (T9 and T10) due to fall     Comorbidities   Diabetic neuropathy associated with type 2 diabetes mellitus (HCC) E11.40    Venous insufficiency I87.2    Obesity, Class I, BMI 30-34.9 E66.9    Chronic back pain M54.9, G89.29    Generalized osteoarthritis of multiple sites M15.9    Bipolar affective disorder  F31.9    Abnormally low high density lipoprotein (HDL) cholesterol with hypertriglyceridemia E78.6, K85.2    Diastolic dysfunction without heart failure I51.89    Chronic obstructive pulmonary disease (COPD)  J44.9    Hypertensive heart disease without heart failure I11.9    Depression F32.9    History of back injury Z87.828    Type 2 diabetes mellitus with diabetic neuropathy, with long-term current use of insulin  E11.40, Z79.4    Anxiety F41.9    Wears glasses Z97.3    History of hepatitis C Z86.19    Sickle cell trait D57.3    History of acute renal failure Z87.448    Hypothyroidism E03.9    Recurrent genital herpes A60.00    Sarcoidosis D86.9    Abscess of right arm L02.413    Hypoxemia requiring supplemental oxygen R09.02, Z99.81    Abnormal nuclear stress test R94.39    Cellulitis and abscess of hand L03.119, L02.519    Cellulitis and abscess of foot L03.119, L02.619    Cellulitis of arm, right L03. 113    Olecranon bursitis of right elbow M70.21    Contusion of left elbow S50. 02XA    Intravenous drug user F19.90    Right Achilles tendinitis M76.61    History of penicillin allergy Z88.0    Falls frequently R29.6    Gastroesophageal reflux disease with hiatal hernia K21.9, K44.9    Memory difficulty R41.3    Mixed connective tissue disease  M35.1    Hyperuricemia E79.0    History of vitamin D deficiency Z86.39    Urge urinary incontinence N39.41    Acute blood loss as cause of postoperative anemia D62    History of fracture due to fall Z87.81    Chronic respiratory failure with hypoxia  J96.11    Cellulitis of right forearm L03. 113    Gout M10.9    Menopause Z78.0    Long term current use of aspirin Z79.82    Opioid dependence  F11.20    Tobacco use disorder F17.200        Plan:     1. Justification for continued stay: Good progression towards established rehabilitation goals. 2. Medical Issues being followed closely:    [x]  Fall and safety precautions     []  Wound Care     [x]  Bowel and Bladder Function     [x]  Fluid Electrolyte and Nutrition Balance     []  Pain Control      3. Issues that 24 hour rehabilitation nursing is following:    [x]  Fall and safety precautions     []  Wound Care     [x]  Bowel and Bladder Function     [x]  Fluid Electrolyte and Nutrition Balance     []  Pain Control      [x]  Assistance with and education on in-room safety with transfers to and from the bed, wheelchair, toilet and shower. 4. Acute rehabilitation plan of care:    [x]  Continue current care and rehab. [x]  Physical Therapy           [x]  Occupational Therapy           []  Speech Therapy     []  Hold Rehab until further notice     5. Medications:    [x]  MAR Reviewed     [x]  Continue Present Medications     6. DVT Prophylaxis:      []  Lovenox     []  Unfractionated Heparin     []  Coumadin     []  NOAC     [x]  ROBERT Stockings     []  Sequential Compression Device     []  None     7. Orders:   > S/P T10, T9, T8 laminectomy; T7, T8, T9, T10, T11, T12 posterior thoracic fusion; segmental spinal instrumentation; K2M Davis type, T7-T8, T11-T12 (12/11/2019 - Dr. Julia Munoz);  History of acute compression fractures of body of thoracic vertebrae (T9 and T10) due to fall   > Thoracic spinal precautions   > Continue:    > Calcium citrate 200 mg PO BID    > Cholecalciferol 2,000 units PO once daily    > Acute Postoperative Blood Loss Anemia   > Anemia work-up (12/13/2019) showed serum iron 11, TIBC 215, iron % saturation 5, ferritin 146   > Hgb/Hct (12/17/2019, on admission) = 7.4/23.1   > Reticulocyte count (12/17/2019) = 2.5   > Hgb/Hct (12/23/2019) = 8.5/26.7    > Continue:    > FeSO4 325 mg PO once daily with breakfast     > Ascorbic Acid 250 mg PO once daily with breakfast (to enhance the absorption of the FeSO4)     > 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 congestive heart failure   > Prior to admission, the patient stated she was on Metolazone 2.5 mg PO q Mon-Wed-Fri   > Metolazone was not given during the patient's hospital stay at [facility]    > Upon admission to the ARU, held Metolazone     > Chronic obstructive pulmonary disease; Chronic respiratory failure with hypoxemia requiring supplemental oxygen (3 LPM)   > Continue:    > Fluticasone-Vilanterol 100-25 1 puff inhaled once daily    > O2 inhalation at 3 LPM via nasal cannula continuous    > Opioid dependence   > Continue Methadone 20 mg PO once daily    > Type 2 diabetes mellitus with stage 3 chronic kidney disease   > HbA1c (12/11/2019) = 7.1   > On 12/22/2019:    > Increased Insulin glargine from 20 units to 22 units PO once daily before breakfast    > Started Insulin lispro 3 units SC TID AC    > On 12/24/2019, increased Insulin lispro from 3 units to 4 units SC TID AC    > On 12/25/2019, increased Insulin glargine from 22 units to 25 units PO once daily before breakfast   > Continue:    > Insulin glargine 25 units PO once daily before breakfast    > Insulin lispro 4 units SC TID AC     > Insulin lispro sliding scale SC TID AC only    > Cough   > On 12/18/2019, started Guaifenesin  mg PO q 12 hr   > Continue Guaifenesin  mg PO q 12 hr    > Constipation   > On 12/17/2019, started:    > Docusate sodium 100 mg PO once daily after breakfast    > PeriColace 2 tabs PO once daily after dinner   > On 12/22/2019, started Lubiprostone 24 mcg PO once daily with breakfast   > Continue:    > Docusate sodium 100 mg PO once daily after breakfast    > PeriColace 2 tabs PO once daily after dinner    > Lubiprostone 24 mcg PO once daily with breakfast     > Analgesia   > Continue:    > Acetaminophen 650 mg PO q 4 hr PRN for pain level less than 5/10    > Methocarbamol 500 mg PO q 8 hr    > Percocet 10/325 1 tab PO q 4 hr PRN for pain level greater than 4/10     > Diet:   > Specifications: Cardiac, diabetic consistent carb 1800 kcal, no concentrated sweets, low purine   > Solids (consistency): Regular    > Liquids (consistency):  Thin       Signed:    Mustapha Moise MD    December 29, 2019

## 2019-12-30 NOTE — PROGRESS NOTES
Problem: Mobility Impaired (Adult and Pediatric)  Goal: *Therapy Goal (Edit Goal, Insert Text)  Description  Physical Therapy Goals: STG  Initiated 12/17/2019, re-assessed 12/30/19 and to be accomplished within 7 day(s) on 01/06/20:  1. Patient will move from supine to sit and sit to supine , scoot up and down and roll side to side in bed with moderate assistance . (mod A rolling; max A sup<> sit)   Update: pt will roll side to side in bed with min A, and move from supine to sit and sit to supine in bed with modA  2. Patient will transfer from bed to chair and chair to bed with maximal assistance with one person assist using the least restrictive device. (MET, mod A w/ slide board)   Update: pt will transfer bed <> w/c with min A and lateral transfer board  3. Patient will perform sit to stand with maximal assistance with one person assist using appropriate AD. (unable at this time, total assist for attempting at p-bar)  4. Patient will perform static sitting balance without UE support for at least 2 minutes, demonstrating appropriate upright and midline posture at supervision level for ease of preparation for transfers. (MET, up to 5 mins with SBA/close Sup)   Update: Pt will demonstrate dynamic sitting balance for up to 15 mins with supervision to assist with ADL's and transfers  5. Patient will perform wheelchair mobility moderate assist for 150 ft distance over even surfaces. (MET, SBA/Sup for up to 200 ft)    Physical Therapy Goals: LTG  Initiated 12/17/2019, revised 12/30/19 and to be accomplished within 28 day(s) on 01/14/2020:   1. Patient will move from supine to sit and sit to supine with min A, and roll side to side in bed with contact guard assist.   2.  Patient will transfer from bed to chair and chair to bed with contact guard assist using lateral transfer board. 3.  Patient will perform sit to stand with moderate assistance and least restrictive device.   4.  Patient will participate in gait assessment if and when appropriate. 5.  Patient will perform wheelchair mobility over even surfaces at supervision level for at least 150 ft, including negotiation of doorways. 6.  Patient will perform wheelchair mobility up/down ramp, uneven sidewalk min A level. Outcome: Progressing Towards Goal   PHYSICAL THERAPY TREATMENT    Patient: Mallory Sinha (65 y.o. female)  Date: 2019  Diagnosis: Status post laminectomy with spinal fusion [Z98.1] Status post laminectomy with spinal fusion  Precautions:    Chart, physical therapy assessment, plan of care and goals were reviewed. Time In: 1440  Time Out: 1500  Patient Seen For: [employer] training; Wheelchair mobility  Pain:  Patient without pain complaints this session. Patient identified with name and : yes    SUBJECTIVE:     I had a fall and since then I have not been able to walk. OBJECTIVE DATA SUMMARY:   Objective:     GROSS ASSESSMENT Daily Assessment    AROM: Grossly decreased, non-functional(B LE's)  PROM: Within functional limits(B LE's)  Strength: Grossly decreased, non-functional(B LE's)  Coordination: Grossly decreased, non-functional  Tone: Normal(B LE's)           TRANSFERS Daily Assessment   PM session from 14:40-15:00, patient was transferred from the wheelchair <> bed with moderate assistance using the lateral sliding board. Transfer Type: Lateral with transfer board  Transfer Assistance : 3 (Moderate assistance )  Sit to Stand Assistance:  Total assistance(used standing frame to stand 10 mins)  Car Transfers: Not tested  Car Type: (N/A)       GAIT Daily Assessment    Amount of Assistance: 1 (Dependent/total assistance)       STEPS or STAIRS Daily Assessment    Level of Assist : 0 (Not tested)       BALANCE Daily Assessment    Sitting - Static: Fair (occasional)(+)  Sitting - Dynamic: Fair (occasional)  Standing - Static: Poor  Standing - Dynamic : Impaired       WHEELCHAIR MOBILITY Daily Assessment    Able to Propel (ft): 160 feet  Functional Level: 5(using B UE's)  Curbs/Ramps Assist Required (FIM Score): 0 (Not tested)  Wheelchair Setup Assist Required : 4 (Minimal assistance)(pt can position w/c and manage arm rests)  Wheelchair Management: Manages left brake;Manages right brake;Manages left armrest;Manages right armrest(needs assist for footrests)       Neuro Re-Education:  Patient challenged with dynamic sitting balance for 12 minutes unsupported and reaching forward and side to side with supervision. Continue to address current deficits with seated unsupported balance to improve transfers and to eliminate use of transfer board. ASSESSMENT:  Patient is seen for session to address deficits in strength, mobility, transfers and balance. Patient agreeable to skilled session to address deficits and she was handed to this therapist and continuation of skilled session was performed. Patient with transfers from the wheelchair to the bed and back with use of transfer board, verbal cues, tactile cuing and moderate assistance. Patient gets easily distracted and requires re-direction during session 20% of this session. Continue to address current deficits for improved overall functional independence. Progression toward goals:  []      Improving appropriately and progressing toward goals  [x]      Improving slowly and progressing toward goals  []      Not making progress toward goals and plan of care will be adjusted     PLAN:  Patient continues to benefit from skilled intervention to address the above impairments. Continue treatment per established plan of care. Discharge Recommendations:  with 24 hr care vs SNF 2/2 burden of care  Further Equipment Recommendations for Discharge:  WC-18 inch, WC ramp, transfer board     Estimated LOS:1/14/19    Activity Tolerance:    Tolerates session fair, fatigued after standing frame session with another physical therapist.  Please refer to the flowsheet for vital signs taken during this treatment.     After treatment:   Patient left in room with call bell and phone available      Stephanie Vásquez  12/30/2019

## 2019-12-30 NOTE — PROGRESS NOTES
Problem: Diabetes Self-Management  Goal: *Disease process and treatment process  Description  Define diabetes and identify own type of diabetes; list 3 options for treating diabetes. Outcome: Progressing Towards Goal  Goal: *Incorporating nutritional management into lifestyle  Description  Describe effect of type, amount and timing of food on blood glucose; list 3 methods for planning meals. Outcome: Progressing Towards Goal  Goal: *Incorporating physical activity into lifestyle  Description  State effect of exercise on blood glucose levels. Outcome: Progressing Towards Goal  Goal: *Developing strategies to promote health/change behavior  Description  Define the ABC's of diabetes; identify appropriate screenings, schedule and personal plan for screenings. Outcome: Progressing Towards Goal  Goal: *Using medications safely  Description  State effect of diabetes medications on diabetes; name diabetes medication taking, action and side effects. Outcome: Progressing Towards Goal  Goal: *Monitoring blood glucose, interpreting and using results  Description  Identify recommended blood glucose targets  and personal targets. Outcome: Progressing Towards Goal  Goal: *Prevention, detection, treatment of acute complications  Description  List symptoms of hyper- and hypoglycemia; describe how to treat low blood sugar and actions for lowering  high blood glucose level. Outcome: Progressing Towards Goal  Goal: *Prevention, detection and treatment of chronic complications  Description  Define the natural course of diabetes and describe the relationship of blood glucose levels to long term complications of diabetes.   Outcome: Progressing Towards Goal  Goal: *Developing strategies to address psychosocial issues  Description  Describe feelings about living with diabetes; identify support needed and support network  Outcome: Progressing Towards Goal  Goal: *Insulin pump training  Outcome: Progressing Towards Goal  Goal: *Sick day guidelines  Outcome: Progressing Towards Goal  Goal: *Patient Specific Goal (EDIT GOAL, INSERT TEXT)  Outcome: Progressing Towards Goal     Problem: Patient Education: Go to Patient Education Activity  Goal: Patient/Family Education  Outcome: Progressing Towards Goal     Problem: Falls - Risk of  Goal: *Absence of Falls  Description  Document Dee Stewart Fall Risk and appropriate interventions in the flowsheet. Outcome: Progressing Towards Goal  Note: Fall Risk Interventions:  Mobility Interventions: Bed/chair exit alarm, Patient to call before getting OOB    Mentation Interventions: Adequate sleep, hydration, pain control, Bed/chair exit alarm    Medication Interventions: Patient to call before getting OOB, Bed/chair exit alarm    Elimination Interventions: Bed/chair exit alarm, Call light in reach, Patient to call for help with toileting needs    History of Falls Interventions: Bed/chair exit alarm         Problem: Patient Education: Go to Patient Education Activity  Goal: Patient/Family Education  Outcome: Progressing Towards Goal     Problem: Pressure Injury - Risk of  Goal: *Prevention of pressure injury  Description  Document Florian Scale and appropriate interventions in the flowsheet.   Outcome: Progressing Towards Goal  Note: Pressure Injury Interventions:  Sensory Interventions: Assess changes in LOC    Moisture Interventions: Absorbent underpads    Activity Interventions: Increase time out of bed, Pressure redistribution bed/mattress(bed type)    Mobility Interventions: Pressure redistribution bed/mattress (bed type)    Nutrition Interventions: Document food/fluid/supplement intake    Friction and Shear Interventions: HOB 30 degrees or less                Problem: Patient Education: Go to Patient Education Activity  Goal: Patient/Family Education  Outcome: Progressing Towards Goal     Problem: Infection - Risk of, Surgical Site Infection  Goal: *Absence of surgical site infection signs and symptoms  Outcome: Progressing Towards Goal     Problem: Patient Education: Go to Patient Education Activity  Goal: Patient/Family Education  Outcome: Progressing Towards Goal     Problem: Pain  Goal: *Control of Pain  Outcome: Progressing Towards Goal  Goal: *PALLIATIVE CARE:  Alleviation of Pain  Outcome: Progressing Towards Goal     Problem: Patient Education: Go to Patient Education Activity  Goal: Patient/Family Education  Outcome: Progressing Towards Goal     Problem: Nutrition Deficit  Goal: *Optimize nutritional status  Outcome: Progressing Towards Goal     Problem: Injury - Risk of, Adverse Drug Event  Goal: *Absence of adverse drug events  Outcome: Progressing Towards Goal  Goal: *Absence of medication errors  Outcome: Progressing Towards Goal  Goal: *Knowledge of prescribed medications  Outcome: Progressing Towards Goal     Problem: Patient Education: Go to Patient Education Activity  Goal: Patient/Family Education  Outcome: Progressing Towards Goal     Problem: Inpatient Rehab (Adult)  Goal: *LTG: Avoids injury/falls 100% of time related to deficits  Outcome: Progressing Towards Goal  Goal: *LTG: Avoids infection 100% of time related to deficits  Outcome: Progressing Towards Goal  Goal: *LTG: Verbalize understanding of diagnosis and risk factors for recurring stroke  Outcome: Progressing Towards Goal  Goal: *LTG: Absence of DVT during hospitalization  Outcome: Progressing Towards Goal  Goal: *LTG: Maintains Skin Integrity With No Evidence of Pressure Injury 100% of Time  Outcome: Progressing Towards Goal  Goal: Interventions  Outcome: Progressing Towards Goal     Problem: Patient Education: Go to Patient Education Activity  Goal: Patient/Family Education  Outcome: Progressing Towards Goal

## 2019-12-30 NOTE — PROGRESS NOTES
SHIFT CHANGE NOTE FOR [facility]    Bedside and Verbal shift change report given to [facility] (oncoming nurse) by Layla Joyce LPN (offgoing nurse). Report included the following information SBAR, Kardex, MAR and Recent Results.     Situation:   Code Status: Full Code   Reason for Admission: Spinal Thoracic laminectomy T 6-T [address] Day: 13   Problem List:   Hospital Problems  Date Reviewed: 12/29/2019          Codes Class Noted POA    Chronic respiratory failure with hypoxia (HCC) (Chronic) ICD-10-CM: J96.11  ICD-9-CM: 518.83, 799.02  Unknown Yes    Overview Signed 12/16/2019 10:11 PM by Dee Pang MD     On home oxygen inhalation at 3 LPM via [place]             Opioid dependence ([facility] (Chronic) ICD-10-CM: F11.20  ICD-9-CM: 304.00  Unknown Yes    Overview Signed 12/16/2019 10:54 PM by Dee Pang MD     On Methadone             Acute blood loss as cause of postoperative anemia ICD-10-CM: D62  ICD-9-CM: 285.1  12/12/2019 Yes        Impaired mobility and ADLs ICD-10-CM: Z74.09  ICD-9-CM: 799.89  12/11/2019 Yes        Spinal cord compression ([facility] ICD-10-CM: G95.20  ICD-9-CM: 336.9  12/11/2019 Yes        Compression fracture of body of thoracic vertebra (HCC) ICD-10-CM: S22.000A  ICD-9-CM: 805.2  12/11/2019 Yes        * (Principal) Status post laminectomy with spinal fusion ICD-10-CM: Z98.1  ICD-9-CM: V45.4  12/11/2019 Yes    Overview Signed 12/16/2019 10:05 PM by Dee Pang MD     S/P T10, T9, T8 laminectomy; T7, T8, T9, T10, T11, T12 posterior thoracic fusion; segmental spinal instrumentation; K2M [place] type, T7-T8, T11-T12 (12/11/2019 - Dr. Rigoberto Morales)             History of fracture due to fall ICD-10-CM: Z87.81  ICD-9-CM: V15.51  12/11/2019 Yes        Hypoxemia requiring supplemental oxygen (Chronic) ICD-10-CM: R09.02, Z99.81  ICD-9-CM: 799.02  12/29/2014 Yes        Chronic obstructive pulmonary disease (COPD) (HCC) (Chronic) ICD-10-CM: J44.9  ICD-9-CM: 496  Unknown Yes    Overview Signed 4/23/2013 10:01 AM by Deanna Keys     SOB, on paula O2  Recent admission with psychosis             Hypertensive heart disease without heart failure (Chronic) ICD-10-CM: I11.9  ICD-9-CM: 402.90  Unknown Yes    Overview Signed 4/23/2013 10:02 AM by Rabia CALLES     Better controlled             Type 2 diabetes mellitus with diabetic neuropathy, with long-term current use of insulin ([facility] (Chronic) ICD-10-CM: E11.40, Z79.4  ICD-9-CM: 250.60, 357.2, V58.67  Unknown Yes    Overview Signed 12/18/2019  2:13 PM by Sasha Nunez MD     HbA1c (12/11/2019) = 7.1                   Background:   Past Medical History:   Past Medical History:   Diagnosis Date    Abnormally low high density lipoprotein (HDL) cholesterol with hypertriglyceridemia     Lipid profile (11/6/2016) showed , , HDL 38, LDL 37    Acute blood loss as cause of postoperative anemia 12/12/2019    Anxiety     Bipolar affective disorder ([facility] 12/5/2012    Cellulitis of right forearm 05/04/2017    Chronic back pain     Chronic obstructive pulmonary disease (COPD) (HCC)     SOB, on paula O2 Recent admission with psychosis     Chronic respiratory failure with hypoxia (HCC)     On home oxygen inhalation at 3 LPM via [place]    Contusion of left elbow 5/4/2017    Depression     Diabetic neuropathy associated with type 2 diabetes mellitus (HCC)     Diastolic dysfunction without heart failure     Stable on diuretics     Falls frequently     Gastroesophageal reflux disease with hiatal hernia     Generalized osteoarthritis of multiple sites     Gout     On Allopurinol    History of acute renal failure 5/31/2013    History of back injury     jumped out of second story window     History of fracture due to fall 12/11/2019    History of hepatitis C     treated    History of penicillin allergy     History of vitamin D deficiency 5/10/2017    Hypertensive heart disease without heart failure     Better controlled     Hyperuricemia 5/26/2017    Hypothyroidism     Hypoxemia requiring supplemental oxygen 12/29/2014    Intravenous drug user 5/2/2017    Long term current use of aspirin     Memory difficulty     Menopause     Mixed connective tissue disease ([facility] 5/10/2017    Obesity, Class I, BMI 30-34.9     Olecranon bursitis of right elbow 5/4/2017    Opioid dependence ([facility]     On Methadone    Recurrent genital herpes 5/31/2013    Right Achilles tendinitis 5/2/2017    Sarcoidosis     Sickle cell trait ([facility]     Tobacco use disorder     Type 2 diabetes mellitus with diabetic neuropathy, with long-term current use of insulin ([facility])     HbA1c (12/11/2019) = 7.1    Urge urinary incontinence 5/10/2017    Venous insufficiency     Wears glasses       Patient taking anticoagulants no    Patient has a defibrillator: no     Assessment:   Changes in Assessment throughout shift: None     Patient has central line: no Reasons if yes: Removed 12/16/19  Insertion date:n/a Last dressing date:n/a   Patient has Renae Cath: no Reasons if yes: n/a   Insertion date:n/a     Last Vitals:     Vitals:    12/28/19 1554 12/28/19 2049 12/29/19 0802 12/29/19 1549   BP: 122/70 120/75 123/66 110/68   Pulse: 84 75 71 79   Resp: 19 17 18 18   Temp: 98.4 °F (36.9 °C) 98.3 °F (36.8 °C) 97.6 °F (36.4 °C) 97.9 °F (36.6 °C)   SpO2: 98% 98% 100% 99%   Weight:       LMP: 11/25/2012        PAIN    Pain Assessment    Pain Intensity 1: 6 (12/29/19 1810) Pain Intensity 1: 2 (12/29/14 1105)    Pain Location 1: Back Pain Location 1: Abdomen    Pain Intervention(s) 1: Medication (see MAR) Pain Intervention(s) 1: Medication (see MAR)  Patient Stated Pain Goal: 0 Patient Stated Pain Goal: 0  o Intervention effective: yes    o Other actions taken for pain: no c/o     Skin Assessment  Skin color Skin Color: Appropriate for ethnicity  Condition/Temperature Skin Condition/Temp: Dry, Warm  Integrity Skin Integrity: Incision (comment)  Turgor Turgor: Non-tenting  Weekly Pressure Ulcer Documentation  Pressure  Injury Documentation: No Pressure Injury Noted-Pressure Ulcer Prevention Initiated  Wound Prevention & Protection Methods  Orientation of wound Orientation of Wound Prevention: Posterior  Location of Prevention Location of Wound Prevention: Buttocks, Sacrum/Coccyx  Dressing Present Dressing Present : No  Dressing Status Dressing Status: Intact  Wound Offloading Wound Offloading (Prevention Methods): Bed, pressure redistribution/air     INTAKE/OUPUT  Date 12/28/19 1900 - 12/29/19 0659 12/29/19 0700 - 12/30/19 0659   Shift 4315-8994 24 Hour Total 9783-8230 0913-7288 24 Hour Total   INTAKE   Shift Total(mL/kg)        OUTPUT   Urine(mL/kg/hr)          Urine Occurrence(s) 3 x 5 x 1 x  1 x   Stool          Stool Occurrence(s) 1 x 1 x 1 x  1 x   Shift Total(mL/kg)        NET        Weight (kg) 42.9 42.9 42.9 42.9 42.9       Recommendations:  1. Patient needs and requests: met    2. Diet: Cardiac DM Reg /thin liq    3. Pending tests/procedures: none     4. Functional Level/Equipment: wheelchair    5. Estimated Discharge Date: TBD Posted on Whiteboard in Patients Room: yes     HEALS Safety Check    A safety check occurred in the patient's room between off going nurse and oncoming nurse listed above.     The safety check included the below items  Area Items   H  High Alert Medications - Verify all high alert medication drips (heparin, PCA, etc.)   E  Equipment - Suction is set up for ALL patients (with yanker)  - Red plugs utilized for all equipment (IV pumps, etc.)  - WOWs wiped down at end of shift.  - Room stocked with oxygen, suction, and other unit-specific supplies   A  Alarms - Bed alarm is set for fall risk patients  - Ensure chair alarm is in place and activated if patient is up in a chair   L  Lines - Check IV for any infiltration  - Renae bag is empty if patient has a Renae   - Tubing and IV bags are labeled   S  Safety   - Room is clean, patient is clean, and equipment is clean.  - Hallways are clear from equipment besides carts. - Fall bracelet on for fall risk patients  - Ensure room is clear and free of clutter  - Suction is set up for ALL patients (with lashay)  - Hallways are clear from equipment besides carts.    - Isolation precautions followed, supplies available outside room, sign posted

## 2019-12-30 NOTE — PROGRESS NOTES
Problem: Mobility Impaired (Adult and Pediatric)  Goal: *Therapy Goal (Edit Goal, Insert Text)  Description  Physical Therapy Goals: STG  Initiated 12/17/2019, re-assessed 12/30/19 and to be accomplished within 7 day(s) on 01/06/20:  1. Patient will move from supine to sit and sit to supine , scoot up and down and roll side to side in bed with moderate assistance . (mod A rolling; max A sup<> sit)   Update: pt will roll side to side in bed with min A, and move from supine to sit and sit to supine in bed with modA  2. Patient will transfer from bed to chair and chair to bed with maximal assistance with one person assist using the least restrictive device. (MET, mod A w/ slide board)   Update: pt will transfer bed <> w/c with min A and lateral transfer board  3. Patient will perform sit to stand with maximal assistance with one person assist using appropriate AD. (unable at this time, total assist for attempting at p-bar)  4. Patient will perform static sitting balance without UE support for at least 2 minutes, demonstrating appropriate upright and midline posture at supervision level for ease of preparation for transfers. (MET, up to 5 mins with SBA/close Sup)   Update: Pt will demonstrate dynamic sitting balance for up to 15 mins with supervision to assist with ADL's and transfers  5. Patient will perform wheelchair mobility moderate assist for 150 ft distance over even surfaces. (MET, SBA/Sup for up to 200 ft)    Physical Therapy Goals: LTG  Initiated 12/17/2019, revised 12/30/19 and to be accomplished within 28 day(s) on 01/14/2020:   1. Patient will move from supine to sit and sit to supine with min A, and roll side to side in bed with contact guard assist.   2.  Patient will transfer from bed to chair and chair to bed with contact guard assist using lateral transfer board. 3.  Patient will perform sit to stand with moderate assistance and least restrictive device.   4.  Patient will participate in gait assessment if and when appropriate. 5.  Patient will perform wheelchair mobility over even surfaces at supervision level for at least 150 ft, including negotiation of doorways. 6.  Patient will perform wheelchair mobility up/down ramp, uneven sidewalk min A level. Outcome: Progressing Towards Goal   PHYSICAL THERAPY WEEKLY PROGRESS NOTE    Patient: Taylor Sinha (65 y.o. female)  Date: 2019  Diagnosis: Status post laminectomy with spinal fusion [Z98.1]   Status post laminectomy with spinal fusion  Precautions:  falls, spinal prec (thoracic), O2  Chart, physical therapy assessment, plan of care and goals were reviewed. Pain:  Pt pain was not reported pre-treatment. Pt pain was not reported post-treatment. Time in: 1345   Time out: 1440    Pt seen for: w/c mobility, transfers training, bed mobility training, sit/stand training, pt education    Patient identified with name and : yes    SUBJECTIVE:     Pt stated, \"my legs are burning today. \"  \"my bumm feels so heavy today. \"  Pt agreeable to participate in PT and agreeable to try standing frame. OBJECTIVE DATA SUMMARY:   AROM: grossly decreased, non-functional B LE's,  PROM WFL's B LE's    FIM SCORES Initial Assessment Weekly Progress Assessment 2019   Bed/Chair/Wheelchair Transfers 1 3   (with lateral transfer board)   Wheelchair Mobility 2 5   Walking Mcdonough 0 1   Steps/Stairs 0 0   Please see [facility] Interdisciplinary Eval: Coordination/Balance Section for details regarding FIM score description.     BED/CHAIR/WHEELCHAIR TRANSFERS Initial Assessment Weekly Progress Assessment 2019   Rolling Right 2 (Maximal assistance) 3 (Moderate assistance)  Needs assist for LE mgm't   Rolling Left 2 (Maximal assistance) 3 (Moderate assistance)  Needs assist for LE mgm't   Supine to Sit 2 (Maximal assistance) 3 (Moderate assistance)(for LE's mgm't & to initiate lift off or trunk)  On mat table w/out SR's   Sit to Stand Total assistance Total assistance(used standing frame to stand 10 mins)   Sit to Supine 2 (Maximal assistance) 2 (Maximal assistance)(for LE mgm't.)   Transfer Type Lateral with transfer board Lateral with transfer board   Comments using transfer board needing max A x 2 for safety, cues for sequencing and appropriate scooting technique  Using transfer board, needs mod A to help with facilitating wt shift, scooting, and head-hips relationship. Needs board set up, but pt able to position w/c and flip arm rest back in preparation. Sit to stand attempted at p-bars with max A x 2 people. Stood in standing frame x 10 mins during today's session.   during standing frame activity but O2 sats 97% w/3L O2 donned   Car Transfer Not tested Not tested   Car Type N/A  N/a     GROSS ASSESSMENT Weekly Progress Assessment 12/30/2019   AROM Grossly decreased, non-functional(B LE's)   Strength Grossly decreased, non-functional(B LE's)   Coordination Grossly decreased, non-functional   Tone Normal(B LE's)   Sensation     PROM Within functional limits(B LE's)       WHEELCHAIR MOBILITY/MANAGEMENT Initial Assessment Weekly Progress Assessment 12/30/2019   Able to Propel 140 feet(max A for steering) 160 feet (with supervision using B UE's)   Curbs/ramps assistance required 0 (Not tested) 0 (Not tested)   Wheelchair set up assistance required 2 (Maximal assistance)  3 (Moderate assistance)   Wheelchair management Manages left brake(needing assist with brakes) Manages left brake;Manages right brake;Manages left armrest;Manages right armrest(needs assist for footrests)     WALKING INDEPENDENCE Initial Assessment Weekly Progress Assessment 12/30/2019   Assistive device (N/A)  P-bars   Ambulation assistance - level surface    Total assist   Distance 0 Feet (ft)  0 Feet (ft)   Comments not tested at this time due to safety concern Unable at this time due to B LE weakness; standing frame initiated during session on 12/30/19       STEPS/STAIRS Initial Assessment Weekly Progress Assessment 12/30/2019   Steps/Stairs ambulated 0  0   Rail Use (N/A)  (n/a)   Comments unable to assess at this time due to safety/weakness  Unable at this time due to weakness in B LE's and unable to stand/walk   Curbs/Ramps (not yet able to assess)  Not tested yet       Neuro Re-Education:  Standing frame utilized to promote good wt bearing on B LE's to initiate muscle contraction and strengthening. Pt stood x 10 mins with SBA/CGA in standing frame. Pt relying on frame to hold her up due to ongoing weakness.  and O2 sats 97% on 3 L of supplemental O2.       ASSESSMENT:  Pt seen for treatment to include re-assessment of goals for weekly note. Pt making slow progress in w/c mobility, bed mobility, and slideboard transfers. Continues to demonstrate significant weakness in B LE's with inability to stand. Standing frame initiated this session to encourage wt bearing and muscle activation to hopefully improve sit <> stands and transfers. Pt demonstrated bed mobility with min A for assist with LE's during rolling this session. Pt remains cooperative with PT. Pt is easily distracted by things going on around her and talkative throughout session with vc's to redirect focus back on sessions. STG's and LTG's updated accordingly based on progress. Progression toward goals:  []      Improving appropriately and progressing toward goals  [x]      Improving slowly and progressing toward goals  []      Not making progress toward goals and plan of care will be adjusted     PLAN:  Patient continues to benefit from skilled intervention to address the above impairments. Continue treatment per established plan of care. Continue to incorporate standing frame into sessions for LE strengthening.    Discharge Recommendations:  SNF vs Home health with 24 hr assistance  Further Equipment Recommendations for Discharge:  wheelchair, lateral transfer board, gait belt      Estimated LOS: 01/14/20    Activity Tolerance:   Fair +; pt without c/o's of dizziness, SOB, or nausea; Pt does have pain issues and needs rest breaks  Please refer to the flowsheet for vital signs taken during this treatment.   After treatment:   [] Patient left in no apparent distress in bed  [x] Patient left in no apparent distress sitting up in chair,and handed off to PTA for continuation of therapy  [] Patient left in no apparent distress in w/c mobilizing under own power  [] Patient left in no apparent distress dining area  [] Patient left in no apparent distress mobilizing under own power  [] Call bell left within reach  [] Nursing notified  [] Caregiver present  [] Bed alarm activated       Sergio Galindo, PT  12/30/2019

## 2019-12-30 NOTE — PROGRESS NOTES
Sissy Gonzalez   Occupational Therapy Assistant   Occupational Therapy   Progress Notes   Incomplete       Problem: Self Care Deficits Care Plan (Adult)  Goal: *Therapy Goal (Edit Goal, Insert Text)  Description  Occupational Therapy Goals   Long Term Goals  Initiated 12/17/19 and to be accomplished within 4 week(s)  to facilitate increased self care independence and strength for return to PLOF and decreased care giver burden:   2. Pt will perform grooming with Mod I.   3. Pt will perform UB bathing with SBA. 4. Pt will perform LB bathing with Tyree. 5. Pt will perform tub/shower transfer <> TTB with Min A.   6. Pt will perform UB dressing with Set-up. 7. Pt will perform LB dressing with Min A.  8. Pt will perform toileting task with Min A.  9. Pt will perform toilet transfer with Min A/CGA. Short Term Goals   Initiated 12/17/19 and to be accomplished within 7 day(s) (12/24/19) to facilitate increased self care independence and strength for return to PLOF and decreased care giver burden:   1. Pt will perform self-feeding with Mod I.   2. Pt will perform grooming with set-up. (GM)  3. Pt will perform UB bathing with Mod A. (GM)  4. Pt will perform LB bathing with Mod A in LRE. (GM)  5. Pt will perform tub/shower transfer <> TTB with MaxA x 1.  6. Pt will perform UB dressing with SBA. 7. Pt will perform LB dressing with Mod A using AE as needed. (GM)  8. Pt will perform toileting task with Max A x 1. (GM)  9. Pt will perform toilet transfer with MaxA x 1. Outcome Measures:  (12/17/19) Dynamometer  strength: Right= 27.33# (norm=55. 1#) Left= 34.67# (norm=45.7#)  (12/24/19) 9-hole peg test: Right=45.46 seconds ; Left=41.13 seconds       Outcome: Progressing Towards Goal  Goal: *Therapy Goal (Edit Goal, Insert Text)  Outcome: Progressing Towards Goal  Goal: Interventions  Outcome: Progressing Towards Goal    Occupational Therapy TREATMENT    Patient: Ariel Abrams Roots   62 y.o.     Patient identified with name and : yes    Date: 2019    First Tx Session  Time In: 1030  Time Out[de-identified] 1200      Diagnosis: Status post laminectomy with spinal fusion [Z98.1]   Precautions:    Chart, occupational therapy assessment, plan of care, and goals were reviewed. Pain:  Pt reports 8/10 pain or discomfort prior to treatment. Pt reports 8/10 pain or discomfort post treatment. Intervention Provided: None    SUBJECTIVE:   Patient stated . I am having spasm in my left knee. I was wet up to my neck and the nurse wash me up, I couldn't lay here in pee. My upper body is strong now'  OBJECTIVE DATA SUMMARY:   Pt scheduled for mid morning bath, pt seen sitting BSC upon arrival in room. Pt stayed on 3L of O2 during session. IADL Daily Assessment    Pt performed laundry from wheel chair level by placing laundry into washing machine. Degergent added by therapy 2/2 decrease standing tolerance/ balance. GROOMING Daily Assessment    Grooming  Grooming Assistance : 6 (Modified independent)  Comments: Pt apply lotion to face and brush hair. LOWER BODY BATHING Daily Assessment    Lower Body Bathing  Bathing Assistance, Lower : 4 (Minimal assistance)  Adaptive Equipment: Long handled sponge  Position Performed: Seated in chair  Comments: Pt washer LE's with LH sponge, min assist with rinsing. TOILETING Daily Assessment    Toileting  Toileting Assistance (FIM Score): 2 (Maximal assistance)  Cues: Physical assistance for pants down;Physical assistance for pants up and total assist for hygiene in standing with support. UPPER BODY DRESSING Daily Assessment    Upper Body Dressing   Dressing Assistance : 5 (Supervision)  Comments: Pt doff short sleeve shirt /shante long sleeve shirt by buttoning the first three button from the bottom than donning shirt over head.      LOWER BODY DRESSING Daily Assessment    Lower Body Dressing   Dressing Assistance : 3 (Moderate assistance)  Leg Crossed Method Used: No  Position Performed: Seated in chair;Standing  Adaptive Equipment Used: Reacher;Sock aid  Don/Doff Anti-Embolic Stockings: 1 (Total assistance)  Comments: Pt req'd re-ed on using reacher to assist with donning pants and sock using sock aid. Pt req'd support in standing 2/2 decrease standing balance while brief and pants donned dependent over buttocks. MOBILITY/TRANSFERS Daily Assessment    Functional Transfers  Toilet Transfer : Other (comment)(Pt stood with support while BSC replace wheel chair.)  Amount of Assistance Required: 3 (Moderate assistance for support)       ASSESSMENT: Pt req'd re-education on using AD with LB dressing 2/2 poor re-called. Pt demonstrates UE's  strength with sit to stand   Progression toward goals:  []          Improving appropriately and progressing toward goals  [x]          Improving slowly and progressing toward goals  []          Not making progress toward goals and plan of care will be adjusted     PLAN:  Patient continues to benefit from skilled intervention to address the above impairments. Continue treatment per established plan of care. Discharge Recommendations: Home Health  Further Equipment Recommendations for Discharge: 3:1 commode     Activity Tolerance:  Fair      Estimated LOS:1/14//2020    Please refer to the flow sheet for vital signs taken during this treatment. After treatment:   [x]  Patient left in no apparent distress sitting up in chair   []  Patient left in no apparent distress in bed  []  Call bell left within reach  []  Nursing notified  []  Caregiver present  [x]  Pt set up on 3L of O2 in dinning room    COMMUNICATION/EDUCATION:   [] Home safety education was provided and the patient/caregiver indicated understanding. [] Patient/family have participated as able in goal setting and plan of care. [] Patient/family agree to work toward stated goals and plan of care.   [] Patient understands intent and goals of therapy, but is neutral about his/her participation. [] Patient is unable to participate in goal setting and plan of care.       Lafonda Nyhan COTA/L  12/30/2019

## 2019-12-31 NOTE — PROGRESS NOTES
SHIFT CHANGE NOTE FOR [facility]    Bedside and Verbal shift change report given to Crista Garcia RN (oncoming nurse) by Eneida Mcbride LPN (offgoing nurse). Report included the following information SBAR, Kardex, MAR and Recent Results.     Situation:   Code Status: Full Code   Reason for Admission: Spinal Thoracic laminectomy T 6-T [address] Day: 15   Problem List:   Hospital Problems  Date Reviewed: 12/29/2019          Codes Class Noted POA    Chronic respiratory failure with hypoxia (HCC) (Chronic) ICD-10-CM: J96.11  ICD-9-CM: 518.83, 799.02  Unknown Yes    Overview Signed 12/16/2019 10:11 PM by Fco Gómez MD     On home oxygen inhalation at 3 LPM via NC             Opioid dependence ([facility] [address] (Chronic) ICD-10-CM: F11.20  ICD-9-CM: 304.00  Unknown Yes    Overview Signed 12/16/2019 10:54 PM by Fco Gómez MD     On Methadone             Acute blood loss as cause of postoperative anemia ICD-10-CM: D62  ICD-9-CM: 285.1  12/12/2019 Yes        Impaired mobility and ADLs ICD-10-CM: Z74.09  ICD-9-CM: 799.89  12/11/2019 Yes        Spinal cord compression ([facility] [address] ICD-10-CM: G95.20  ICD-9-CM: 336.9  12/11/2019 Yes        Compression fracture of body of thoracic vertebra (HCC) ICD-10-CM: S22.000A  ICD-9-CM: 805.2  12/11/2019 Yes        * (Principal) Status post laminectomy with spinal fusion ICD-10-CM: Z98.1  ICD-9-CM: V45.4  12/11/2019 Yes    Overview Signed 12/16/2019 10:05 PM by Fco Gómez MD     S/P T10, T9, T8 laminectomy; T7, T8, T9, T10, T11, T12 posterior thoracic fusion; segmental spinal instrumentation; K2M Davis type, T7-T8, T11-T12 (12/11/2019 - Dr. Ros Pathak)             History of fracture due to fall ICD-10-CM: Z87.81  ICD-9-CM: V15.51  12/11/2019 Yes        Hypoxemia requiring supplemental oxygen (Chronic) ICD-10-CM: R09.02, Z99.81  ICD-9-CM: 799.02  12/29/2014 Yes        Chronic obstructive pulmonary disease (COPD) (HCC) (Chronic) ICD-10-CM: J44.9  ICD-9-CM: 496  Unknown Yes    Overview Signed 4/23/2013 10:01 AM by Alexander Harden     SOB, on paula O2  Recent admission with psychosis             Hypertensive heart disease without heart failure (Chronic) ICD-10-CM: I11.9  ICD-9-CM: 402.90  Unknown Yes    Overview Signed 4/23/2013 10:02 AM by Jose CALLES     Better controlled             Type 2 diabetes mellitus with diabetic neuropathy, with long-term current use of insulin ([facility] [address] (Chronic) ICD-10-CM: E11.40, Z79.4  ICD-9-CM: 250.60, 357.2, V58.67  Unknown Yes    Overview Signed 12/18/2019  2:13 PM by Isaac Beverly MD     HbA1c (12/11/2019) = 7.1                   Background:   Past Medical History:   Past Medical History:   Diagnosis Date    Abnormally low high density lipoprotein (HDL) cholesterol with hypertriglyceridemia     Lipid profile (11/6/2016) showed , , HDL 38, LDL 37    Acute blood loss as cause of postoperative anemia 12/12/2019    Anxiety     Bipolar affective disorder ([facility] [address] 12/5/2012    Cellulitis of right forearm 05/04/2017    Chronic back pain     Chronic obstructive pulmonary disease (COPD) (HCC)     SOB, on paula O2 Recent admission with psychosis     Chronic respiratory failure with hypoxia (HCC)     On home oxygen inhalation at 3 LPM via NC    Contusion of left elbow 5/4/2017    Depression     Diabetic neuropathy associated with type 2 diabetes mellitus (HCC)     Diastolic dysfunction without heart failure     Stable on diuretics     Falls frequently     Gastroesophageal reflux disease with hiatal hernia     Generalized osteoarthritis of multiple sites     Gout     On Allopurinol    History of acute renal failure 5/31/2013    History of back injury     jumped out of second story window     History of fracture due to fall 12/11/2019    History of hepatitis C     treated    History of penicillin allergy     History of vitamin D deficiency 5/10/2017    Hypertensive heart disease without heart failure     Better controlled     Hyperuricemia 5/26/2017    Hypothyroidism     Hypoxemia requiring supplemental oxygen 12/29/2014    Intravenous drug user 5/2/2017    Long term current use of aspirin     Memory difficulty     Menopause     Mixed connective tissue disease ([facility] [address] 5/10/2017    Obesity, Class I, BMI 30-34.9     Olecranon bursitis of right elbow 5/4/2017    Opio[facility] dependence ([facility]     On Methadone    Recurrent genital herpes 5/31/2013    Right Achilles tendinitis 5/2/2017    Sarcoidosis     Sickle cell trait ([facility]     Tobacco use disorder     Type 2 diabetes mellitus with diabetic neuropathy, with long-term current use of insulin ([facility])     HbA1c (12/11/2019) = 7.1    Urge urinary incontinence 5/10/2017    Venous insufficiency     Wears glasses       Patient taking anticoagulants no    Patient has a defibrillator: no     Assessment:   Changes in Assessment throughout shift: None     Patient has central line: no Reasons if yes: Removed 12/16/19  Insertion date:n/a Last dressing date:n/a   Patient has Renae Cath: no Reasons if yes: n/a   Insertion date:n/a     Last Vitals:     Vitals:    12/30/19 0739 12/30/19 1537 12/30/19 2208 12/31/19 0809   BP: 124/74 139/77 119/76 119/79   Pulse: 76 82 76 90   Resp: 16 18 18 18   Temp: 98 °F (36.7 °C) 98.6 °F (37 °C) 98 °F (36.7 °C) 98.4 °F (36.9 °C)   SpO2: 100% 99% 100% 95%   Weight:       LMP: 11/25/2012        PAIN    Pain Assessment    Pain Intensity 1: 4 (12/31/19 0809) Pain Intensity 1: 2 (12/29/14 1105)    Pain Location 1: Back Pain Location 1: Abdomen    Pain Intervention(s) 1: Medication (see MAR) Pain Intervention(s) 1: Medication (see MAR)  Patient Stated Pain Goal: 0 Patient Stated Pain Goal: 0  o Intervention effective: yes    o Other actions taken for pain: no c/o     Skin Assessment  Skin color Skin Color: Appropriate for ethnicity  Condition/Temperature Skin Condition/Temp: Warm  Integrity Skin Integrity: Incision (comment)  Turgor Turgor: Non-tenting  Weekly Pressure Ulcer Documentation  Pressure  Injury Documentation: No Pressure Injury Noted-Pressure Ulcer Prevention Initiated  Wound Prevention & Protection Methods  Orientation of wound Orientation of Wound Prevention: Posterior  Location of Prevention Location of Wound Prevention: Sacrum/Coccyx  Dressing Present Dressing Present : No  Dressing Status Dressing Status: Intact  Wound Offloading Wound Offloading (Prevention Methods): Bed, pressure redistribution/air     INTAKE/OUPUT  Date 12/30/19 0700 - 12/31/19 0659 12/31/19 0700 - 01/01/20 0659   Shift 0700-1859 1900-0659 24 Hour Total 0700-1859 1900-0659 24 Hour Total   INTAKE   Shift Total(mL/kg)         OUTPUT   Urine(mL/kg/hr)           Urine Occurrence(s) 3 x 3 x 6 x      Stool           Stool Occurrence(s) 0 x 0 x 0 x      Shift Total(mL/kg)         NET         Weight (kg) 42.9 42.9 42.9 42.9 42.9 42.9       Recommendations:  1. Patient needs and requests: met    2. Diet: Cardiac DM Reg /thin liq    3. Pending tests/procedures: none     4. Functional Level/Equipment: wheelchair    5. Estimated Discharge Date: TBD Posted on Whiteboard in Patients Room: yes     HEALS Safety Check    A safety check occurred in the patient's room between off going nurse and oncoming nurse listed above.     The safety check included the below items  Area Items   H  High Alert Medications - Verify all high alert medication drips (heparin, PCA, etc.)   E  Equipment - Suction is set up for ALL patients (with yanker)  - Red plugs utilized for all equipment (IV pumps, etc.)  - WOWs wiped down at end of shift.  - Room stocked with oxygen, suction, and other unit-specific supplies   A  Alarms - Bed alarm is set for fall risk patients  - Ensure chair alarm is in place and activated if patient is up in a chair   L  Lines - Check IV for any infiltration  - Renae bag is empty if patient has a Renae   - Tubing and IV bags are labeled   S  Safety   - Room is clean, patient is clean, and equipment is clean.  - Hallways are clear from equipment besides carts. - Fall bracelet on for fall risk patients  - Ensure room is clear and free of clutter  - Suction is set up for ALL patients (with lashay)  - Hallways are clear from equipment besides carts.    - Isolation precautions followed, supplies available outside room, sign posted

## 2019-12-31 NOTE — PROGRESS NOTES
SHIFT CHANGE NOTE FOR [facility]    Bedside and Verbal shift change report given to Otelia Members (oncoming nurse) by Jamin Flores RN (offgoing nurse). Report included the following information SBAR, Kardex, MAR and Recent Results.     Situation:   Code Status: Full Code   Reason for Admission: Spinal Thoracic laminectomy T 6-T [address] Day: 15   Problem List:   Hospital Problems  Date Reviewed: 12/29/2019          Codes Class Noted POA    Chronic respiratory failure with hypoxia (HCC) (Chronic) ICD-10-CM: J96.11  ICD-9-CM: 518.83, 799.02  Unknown Yes    Overview Signed 12/16/2019 10:11 PM by Joel Bill MD     On home oxygen inhalation at 3 LPM via NC             Opioid dependence ([facility] [address] (Chronic) ICD-10-CM: F11.20  ICD-9-CM: 304.00  Unknown Yes    Overview Signed 12/16/2019 10:54 PM by Joel Bill MD     On Methadone             Acute blood loss as cause of postoperative anemia ICD-10-CM: D62  ICD-9-CM: 285.1  12/12/2019 Yes        Impaired mobility and ADLs ICD-10-CM: Z74.09  ICD-9-CM: 799.89  12/11/2019 Yes        Spinal cord compression ([facility] [address] ICD-10-CM: G95.20  ICD-9-CM: 336.9  12/11/2019 Yes        Compression fracture of body of thoracic vertebra (HCC) ICD-10-CM: S22.000A  ICD-9-CM: 805.2  12/11/2019 Yes        * (Principal) Status post laminectomy with spinal fusion ICD-10-CM: Z98.1  ICD-9-CM: V45.4  12/11/2019 Yes    Overview Signed 12/16/2019 10:05 PM by Joel Bill MD     S/P T10, T9, T8 laminectomy; T7, T8, T9, T10, T11, T12 posterior thoracic fusion; segmental spinal instrumentation; K2M Davis type, T7-T8, T11-T12 (12/11/2019 - Dr. Ryan Valenzuela)             History of fracture due to fall ICD-10-CM: Z87.81  ICD-9-CM: V15.51  12/11/2019 Yes        Hypoxemia requiring supplemental oxygen (Chronic) ICD-10-CM: R09.02, Z99.81  ICD-9-CM: 799.02  12/29/2014 Yes        Chronic obstructive pulmonary disease (COPD) ([facility] (Chronic) ICD-10-CM: J44.9  ICD-9-CM: 496  Unknown Yes Overview Signed 4/23/2013 10:01 AM by Ward Samaniego     SOB, on paula O2  Recent admission with psychosis             Hypertensive heart disease without heart failure (Chronic) ICD-10-CM: I11.9  ICD-9-CM: 402.90  Unknown Yes    Overview Signed 4/23/2013 10:02 AM by Sonal CALLES     Better controlled             Type 2 diabetes mellitus with diabetic neuropathy, with long-term current use of insulin ([facility] [address] (Chronic) ICD-10-CM: E11.40, Z79.4  ICD-9-CM: 250.60, 357.2, V58.67  Unknown Yes    Overview Signed 12/18/2019  2:13 PM by Laurel Willoughby MD     HbA1c (12/11/2019) = 7.1                   Background:   Past Medical History:   Past Medical History:   Diagnosis Date    Abnormally low high density lipoprotein (HDL) cholesterol with hypertriglyceridemia     Lipid profile (11/6/2016) showed , , HDL 38, LDL 37    Acute blood loss as cause of postoperative anemia 12/12/2019    Anxiety     Bipolar affective disorder ([facility] [address] [facility] 12/5/2012    Cellulitis of right forearm 05/04/2017    Chronic back pain     Chronic obstructive pulmonary disease (COPD) (HCC)     SOB, on paula O2 Recent admission with psychosis     Chronic respiratory failure with hypoxia (HCC)     On home oxygen inhalation at 3 LPM via NC    Contusion of left elbow 5/4/2017    Depression     Diabetic neuropathy associated with type 2 diabetes mellitus (HCC)     Diastolic dysfunction without heart failure     Stable on diuretics     Falls frequently     Gastroesophageal reflux disease with hiatal hernia     Generalized osteoarthritis of multiple sites     Gout     On Allopurinol    History of acute renal failure 5/31/2013    History of back injury     jumped out of second story window     History of fracture due to fall 12/11/2019    History of hepatitis C     treated    History of penicillin allergy     History of vitamin D deficiency 5/10/2017    Hypertensive heart disease without heart failure     Better controlled     Hyperuricemia 5/26/2017    Hypothyroidism     Hypoxemia requiring supplemental oxygen 12/29/2014    Intravenous drug user 5/2/2017    Long term current use of aspirin     Memory difficulty     Menopause     Mixed connective tissue disease ([facility] 5/10/2017    Obesity, Class I, BMI 30-34.9     Olecranon bursitis of right elbow 5/4/2017    Opioid dependence ([facility]     On Methadone    Recurrent genital herpes 5/31/2013    Right Achilles tendinitis 5/2/2017    Sarcoidosis     Sickle cell trait ([facility]     Tobacco use disorder     Type 2 diabetes mellitus with diabetic neuropathy, with long-term current use of insulin ([facility])     HbA1c (12/11/2019) = 7.1    Urge urinary incontinence 5/10/2017    Venous insufficiency     Wears glasses       Patient taking anticoagulants no    Patient has a defibrillator: no     Assessment:   Changes in Assessment throughout shift: None     Patient has central line: no Reasons if yes: Removed 12/16/19  Insertion date:n/a Last dressing date:n/a   Patient has Renae Cath: no Reasons if yes: n/a   Insertion date:n/a     Last Vitals:     Vitals:    12/29/19 2100 12/30/19 0739 12/30/19 1537 12/30/19 2208   BP: 131/80 124/74 139/77 119/76   Pulse: 81 76 82 76   Resp: 18 16 18 18   Temp: 97 °F (36.1 °C) 98 °F (36.7 °C) 98.6 °F (37 °C) 98 °F (36.7 °C)   SpO2: 100% 100% 99% 100%   Weight:       LMP: 11/25/2012        PAIN    Pain Assessment    Pain Intensity 1: 0 (12/31/19 0408) Pain Intensity 1: 2 (12/29/14 1105)    Pain Location 1: Back Pain Location 1: Abdomen    Pain Intervention(s) 1: Medication (see MAR) Pain Intervention(s) 1: Medication (see MAR)  Patient Stated Pain Goal: 0 Patient Stated Pain Goal: 0  o Intervention effective: yes    o Other actions taken for pain: no c/o     Skin Assessment  Skin color Skin Color: Appropriate for ethnicity  Condition/Temperature Skin Condition/Temp: Warm  Integrity Skin Integrity: Incision (comment)  Turgor Turgor: Non-tenting  Weekly Pressure Ulcer Documentation  Pressure  Injury Documentation: No Pressure Injury Noted-Pressure Ulcer Prevention Initiated  Wound Prevention & Protection Methods  Orientation of wound Orientation of Wound Prevention: Posterior  Location of Prevention Location of Wound Prevention: Sacrum/Coccyx  Dressing Present Dressing Present : No  Dressing Status Dressing Status: Intact  Wound Offloading Wound Offloading (Prevention Methods): Bed, pressure redistribution/air     INTAKE/OUPUT  Date 12/30/19 0700 - 12/31/19 0659 12/31/19 0700 - 01/01/20 0659   Shift 0700-1859 1900-0659 24 Hour Total 0700-1859 1900-0659 24 Hour Total   INTAKE   Shift Total(mL/kg)         OUTPUT   Urine(mL/kg/hr)           Urine Occurrence(s) 3 x 2 x 5 x      Stool           Stool Occurrence(s) 0 x 0 x 0 x      Shift Total(mL/kg)         NET         Weight (kg) 42.9 42.9 42.9 42.9 42.9 42.9       Recommendations:  1. Patient needs and requests: met    2. Diet: Cardiac DM Reg /thin liq    3. Pending tests/procedures: none     4. Functional Level/Equipment: wheelchair    5. Estimated Discharge Date: TBD Posted on Whiteboard in Patients Room: yes     HEALS Safety Check    A safety check occurred in the patient's room between off going nurse and oncoming nurse listed above.     The safety check included the below items  Area Items   H  High Alert Medications - Verify all high alert medication drips (heparin, PCA, etc.)   E  Equipment - Suction is set up for ALL patients (with yanker)  - Red plugs utilized for all equipment (IV pumps, etc.)  - WOWs wiped down at end of shift.  - Room stocked with oxygen, suction, and other unit-specific supplies   A  Alarms - Bed alarm is set for fall risk patients  - Ensure chair alarm is in place and activated if patient is up in a chair   L  Lines - Check IV for any infiltration  - Renae bag is empty if patient has a Renae   - Tubing and IV bags are labeled   S  Safety   - Room is clean, patient is clean, and equipment is clean. - Hallways are clear from equipment besides carts. - Fall bracelet on for fall risk patients  - Ensure room is clear and free of clutter  - Suction is set up for ALL patients (with lashay)  - Hallways are clear from equipment besides carts.    - Isolation precautions followed, supplies available outside room, sign posted

## 2019-12-31 NOTE — PROGRESS NOTES
SHIFT CHANGE NOTE FOR [facility]    Bedside and Verbal shift change report given to Nadia (oncoming nurse) by Joshua Solis (offgoing nurse). Report included the following information SBAR, Kardex, MAR and Recent Results.     Situation:   Code Status: Full Code   Reason for Admission: Spinal Thoracic laminectomy T 6-T [address] Day: 14   Problem List:   Hospital Problems  Date Reviewed: 12/29/2019          Codes Class Noted POA    Chronic respiratory failure with hypoxia (HCC) (Chronic) ICD-10-CM: J96.11  ICD-9-CM: 518.83, 799.02  Unknown Yes    Overview Signed 12/16/2019 10:11 PM by Joel Bill MD     On home oxygen inhalation at 3 LPM via NC             Opioid dependence ([facility] [address] (Chronic) ICD-10-CM: F11.20  ICD-9-CM: 304.00  Unknown Yes    Overview Signed 12/16/2019 10:54 PM by Joel Bill MD     On Methadone             Acute blood loss as cause of postoperative anemia ICD-10-CM: D62  ICD-9-CM: 285.1  12/12/2019 Yes        Impaired mobility and ADLs ICD-10-CM: Z74.09  ICD-9-CM: 799.89  12/11/2019 Yes        Spinal cord compression ([facility] [address] ICD-10-CM: G95.20  ICD-9-CM: 336.9  12/11/2019 Yes        Compression fracture of body of thoracic vertebra (HCC) ICD-10-CM: S22.000A  ICD-9-CM: 805.2  12/11/2019 Yes        * (Principal) Status post laminectomy with spinal fusion ICD-10-CM: Z98.1  ICD-9-CM: V45.4  12/11/2019 Yes    Overview Signed 12/16/2019 10:05 PM by Joel Bill MD     S/P T10, T9, T8 laminectomy; T7, T8, T9, T10, T11, T12 posterior thoracic fusion; segmental spinal instrumentation; K2M Davis type, T7-T8, T11-T12 (12/11/2019 - Dr. Ryan Valenzuela)             History of fracture due to fall ICD-10-CM: Z87.81  ICD-9-CM: V15.51  12/11/2019 Yes        Hypoxemia requiring supplemental oxygen (Chronic) ICD-10-CM: R09.02, Z99.81  ICD-9-CM: 799.02  12/29/2014 Yes        Chronic obstructive pulmonary disease (COPD) (HCC) (Chronic) ICD-10-CM: J44.9  ICD-9-CM: 496  Unknown Yes    Overview Signed 4/23/2013 10:01 AM by [facility]     SOB, on paula O2  Recent admission with psychosis             Hypertensive heart disease without heart failure (Chronic) ICD-10-CM: I11.9  ICD-9-CM: 402.90  Unknown Yes    Overview Signed 4/23/2013 10:02 AM by Marjorie CALLES     Better controlled             Type 2 diabetes mellitus with diabetic neuropathy, with long-term current use of insulin ([facility] [address] (Chronic) ICD-10-CM: E11.40, Z79.4  ICD-9-CM: 250.60, 357.2, V58.67  Unknown Yes    Overview Signed 12/18/2019  2:13 PM by Redd Marx MD     HbA1c (12/11/2019) = 7.1                   Background:   Past Medical History:   Past Medical History:   Diagnosis Date    Abnormally low high density lipoprotein (HDL) cholesterol with hypertriglyceridemia     Lipid profile (11/6/2016) showed , , HDL 38, LDL 37    Acute blood loss as cause of postoperative anemia 12/12/2019    Anxiety     Bipolar affective disorder ([facility] [address] 12/5/2012    Cellulitis of right forearm 05/04/2017    Chronic back pain     Chronic obstructive pulmonary disease (COPD) (HCC)     SOB, on paula O2 Recent admission with psychosis     Chronic respiratory failure with hypoxia (HCC)     On home oxygen inhalation at 3 LPM via NC    Contusion of left elbow 5/4/2017    Depression     Diabetic neuropathy associated with type 2 diabetes mellitus (HCC)     Diastolic dysfunction without heart failure     Stable on diuretics     Falls frequently     Gastroesophageal reflux disease with hiatal hernia     Generalized osteoarthritis of multiple sites     Gout     On Allopurinol    History of acute renal failure 5/31/2013    History of back injury     jumped out of second story window     History of fracture due to fall 12/11/2019    History of hepatitis C     treated    History of penicillin allergy     History of vitamin D deficiency 5/10/2017    Hypertensive heart disease without heart failure     Better controlled     Hyperuricemia 5/26/2017    Hypothyroidism     Hypoxemia requiring supplemental oxygen 12/29/2014    Intravenous drug user 5/2/2017    Long term current use of aspirin     Memory difficulty     Menopause     Mixed connective tissue disease ([facility] [address] 5/10/2017    Obesity, Class I, BMI 30-34.9     Olecranon bursitis of right elbow 5/4/2017    [facility] ([facility]     On Methadone    Recurrent genital herpes 5/31/2013    Right Achilles tendinitis 5/2/2017    Sarcoidosis     Sickle cell trait ([facility]     Tobacco use disorder     Type 2 diabetes mellitus with diabetic neuropathy, with long-term current use of insulin ([facility]     HbA1c (12/11/2019) = 7.1    Urge urinary incontinence 5/10/2017    Venous insufficiency     Wears glasses       Patient taking anticoagulants no    Patient has a defibrillator: no     Assessment:   Changes in Assessment throughout shift: None     Patient has central line: no Reasons if yes: Removed 12/16/19  Insertion date:n/a Last dressing date:n/a   Patient has Renae Cath: no Reasons if yes: n/a   Insertion date:n/a     Last Vitals:     Vitals:    12/29/19 1549 12/29/19 2100 12/30/19 0739 12/30/19 1537   BP: 110/68 131/80 124/74 139/77   Pulse: 79 81 76 82   Resp: 18 18 16 18   Temp: 97.9 °F (36.6 °C) 97 °F (36.1 °C) 98 °F (36.7 °C) 98.6 °F (37 °C)   SpO2: 99% 100% 100% 99%   Weight:       LMP: 11/25/2012        PAIN    Pain Assessment    Pain Intensity 1: 4 (12/30/19 1623) Pain Intensity 1: 2 (12/29/14 1105)    Pain Location 1: Back Pain Location 1: Abdomen    Pain Intervention(s) 1: Medication (see MAR) Pain Intervention(s) 1: Medication (see MAR)  Patient Stated Pain Goal: 0 Patient Stated Pain Goal: 0  o Intervention effective: yes    o Other actions taken for pain: no c/o     Skin Assessment  Skin color Skin Color: Appropriate for ethnicity  Condition/Temperature Skin Condition/Temp: Warm  Integrity Skin Integrity: Incision (comment)  Turgor Turgor: Non-tenting  Weekly Pressure Ulcer Documentation  Pressure  Injury Documentation: No Pressure Injury Noted-Pressure Ulcer Prevention Initiated  Wound Prevention & Protection Methods  Orientation of wound Orientation of Wound Prevention: Posterior  Location of Prevention Location of Wound Prevention: Sacrum/Coccyx  Dressing Present Dressing Present : No  Dressing Status Dressing Status: Intact  Wound Offloading Wound Offloading (Prevention Methods): Bed, pressure redistribution/air, Bed, pressure reduction mattress     INTAKE/OUPUT  Date 12/29/19 1900 - 12/30/19 0659 12/30/19 0700 - 12/31/19 0659   Shift 1900-0659 24 Hour Total 8171-8384 5197-0703 24 Hour Total   INTAKE   Shift Total(mL/kg)        OUTPUT   Urine(mL/kg/hr)          Urine Occurrence(s) 3 x 4 x 3 x 0 x 3 x   Stool          Stool Occurrence(s) 0 x 1 x 0 x 0 x 0 x   Shift Total(mL/kg)        NET        Weight (kg) 42.9 42.9 42.9 42.9 42.9       Recommendations:  1. Patient needs and requests: met    2. Diet: Cardiac DM Reg /thin liq    3. Pending tests/procedures: none     4. Functional Level/Equipment: wheelchair    5. Estimated Discharge Date: TBD Posted on Whiteboard in Patients Room: yes     HEALS Safety Check    A safety check occurred in the patient's room between off going nurse and oncoming nurse listed above.     The safety check included the below items  Area Items   H  High Alert Medications - Verify all high alert medication drips (heparin, PCA, etc.)   E  Equipment - Suction is set up for ALL patients (with yanker)  - Red plugs utilized for all equipment (IV pumps, etc.)  - WOWs wiped down at end of shift.  - Room stocked with oxygen, suction, and other unit-specific supplies   A  Alarms - Bed alarm is set for fall risk patients  - Ensure chair alarm is in place and activated if patient is up in a chair   L  Lines - Check IV for any infiltration  - Renae bag is empty if patient has a Renae   - Tubing and IV bags are labeled   S  Safety   - Room is clean, patient is clean, and equipment is clean. - Hallways are clear from equipment besides carts. - Fall bracelet on for fall risk patients  - Ensure room is clear and free of clutter  - Suction is set up for ALL patients (with lashay)  - Hallways are clear from equipment besides carts.    - Isolation precautions followed, supplies available outside room, sign posted

## 2019-12-31 NOTE — INTERDISCIPLINARY ROUNDS
[facility] PHYSICAL REHABILITATION  [address] [place] [address], [address]    INPATIENT REHABILITATION  PRE-TEAM CONFERENCE SUMMARY     Date of Conference: 1/2/20    Patient Information:        Name: Abby Pleitez Age / Sex: 62 y.o. / female   CSN: [id number] MRN: [id number]   [facility] Date: 12/16/2019 Length of Stay: 15 days     Primary Rehabilitation Diagnosis: Impaired Mobility and ADLs secondary to Status post laminectomy with spinal fusion      Therapy:     FIM SCORES Initial Assessment Weekly Progress Assessment 12/31/2019   Eating Functional Level: 5  Comments: Pt requiring encouragement to demo container mgmt I'ly, requiring set-up over all. Pt able to demo utensil use I'ly  S set-up   Swallowing     Grooming 4 6 -mod independent   Bathing 2 UB 5-Supervision  LB-3 mod asdist   Upper Body Dressing Functional Level: 3  Items Applied/Steps Completed: Pullover (4 steps)  Comments: Pt requiring modA to [facility] hospital gown and modA to shante t-shirt. Pt unable to pull down shirt anterior/posterior. 5 -supervision   Lower Body Dressing Functional Level: 1  Items Applied/Steps Completed: Sock, left (1 step), Sock, right (1 step), Elastic waist pants (3 steps), Underpants (3 steps)  Comments: Pt requiring total asst for compression stockings/sock mgmt with pt seated EOB. Pt rolling side:side with maxA with rehab tech present to asst with brief mgmt and donning pants. Pt attempting to bridge hips to shante pants over hips with pt unable to fully clear sacrum. 2-max assist   Toileting Functional Level: 1  Comments: Pt incontinent of bladder/bowel, requiring total asst for hygiene and brief mgmt from bed level.   2-max assist   Bladder  level of assist 1 1   Bladder  accident frequency score 1 1   Bowel  level of assist 1 1   Bowel  accident frequency score 1 1   Toilet Transfer Hedrick Toilet Transfer Score: 0  Comments: NT at this time       Tub/Shower Transfer Hedrick Tub or Shower Type: Tub/Shower combination  Tub/Shower Transfer Score: 0  Comments: NT at this time due to safety and medical status  Shower  3 (Moderate assistance)   Comprehension Primary Mode of Comprehension: Auditory  Score: 6  Comments: occasional repetition needed        Expression Primary Mode of Expression: Verbal  Score: 6        Social Interaction Score: 5  Comments: re-direction to task at times     Problem Solving Score: 4  Comments: min cues for following spinal precautions     Memory Score: 5       FIM SCORES Initial Assessment Weekly Progress Assessment 12/31/2019   Bed/Chair/Wheelchair Transfers Transfer Type: Lateral with transfer board  Transfer Assistance : 1 (Total assistance)(Max A x 2 for safety, sequencing, appropriate ant trunk )  Sit to Stand Assistance:  Total assistance  Car Transfers: Not tested  Car Type: N/A Transfer Type: Lateral with transfer board  Transfer Assistance : 3 (Moderate assistance )  Sit to Stand Assistance: Maximum assistance  Car Transfers: Not tested   Bed Mobility Rolling Right 2 (Maximal assistance)   Rolling Left 2 (Maximal assistance)   Supine to Sit 2 (Maximal assistance)   Sit to Stand Total assistance   Sit to Supine 2 (Maximal assistance)    Rolling Right    Not tested this session   Rolling Left     Not tested this session   Supine to Sit     Not tested this session   Sit to Stand   Maximum assistance   Sit to Supine     Not tested this session      Locomotion (W/C) Able to Propel (ft): 140 feet(max A for steering)  Functional Level: 2  Curbs/Ramps Assist Required (FIM Score): 0 (Not tested)  Wheelchair Setup Assist Required : 2 (Maximal assistance)  Wheelchair Management: Manages left brake(needing assist with brakes) Able to propel (ft) 100 ft   Functional level: 5  Curbs/ramps assist required (FIM SCORE): 0 (NOT TESTED)  Wheelchair set up assistance required: 2 (maximum assistance)  Wheelchair management: manages left brake and right brake with extender     Locomotion (W/C distance) 140 Feet(max A for steering appropriately) (100 ft)   Locomotion (Walk) 0 (Not tested) 0 (Not tested)      Locomotion (Walk dist.) 0 Feet (ft)  0 Feet (ft)   Steps/Stairs Steps/Stairs Ambulated (#): 0  Level of Assist : 0 (Not tested)  Rail Use: (N/A)  Steps/Stairs ambulated (#) 0  Level of assistance: 0 (not tested)  Rail use: N/A         Nursing:     Neuro:   AAA&O x 4           Respiratory:   [] WNL   [x] O2 LPM:  3  Other:   Peripheral Vascular:   [x] TEDS present   [x] Edema present _+1___ Grade   Cardiac:   [x] WNL   [] Other  Genitourinary:   [x] continent   [] incontinent   [] rousseau  Abdominal __1/1/2020_____ LBM  GI: _Cardiac/Regular 1800______ Diet __Thin____ Liquids _____ tube feeds  Musculoskeletal: ____ ROM Transfers __W/C___ Assistive Device Used  __Max assist__ Level of Assistance  Skin Integumentary:   [x] Intact   [] Not Intact   __Fall, pressure injury precautions_Preventative Measures  Details____Patient has healing surgical incision to back, YOAN with steri strips in place. __  Pain: [x] Controlled   [] Not Controlled   Pain Meds:   [] Scheduled   [x] PRN        Registered Dietitian / Nutrition:   No data found. Good meal intake and appetite. Per nursing multiple supplements remain in refrigerator for pt and pt agreeable to discontinue supplement order. Tolerating diet. Noted episode of hypoglycemia 12/31 afternoon, pt reports good meal intake yesterday. Supplements:          [x] Yes   [] No      Amount of supplement consumed: variable; consumes on occasion    No intake or output data in the 24 hours ending 12/31/19 1722                             Last bowel movement: 12/31- soft    Interdisciplinary Team Goals:     1.  Discipline  Physical Therapy    Goal  Patient to perform transfers using lateral transfer board and minimal assistance consistently    Barrier  Decreased strength, transfers, balance and safety    Intervention  Patient to participate in skilled PT sessions to Include strength, mobility, transfer, safety and balance training    Goal written by:   PURNIMA Matute     2. Discipline  Occupational Therapy    Goal  Pt will be Mod independent with UB dressing. Re-called and carryover using AD with LB dressing with supervision. Barrier  Decrease UE strength,decrease independent with self care/toileting tasks    Intervention  Education/strengthening    Goal written by:  LON Marsh     3. Discipline  Speech Therapy    Goal      Barrier      Intervention      Goal written by:       4. Discipline  Nursing    Goal  Patient will show improvement in progressing towards independence in diabetes management. Barrier Unfamiliarity with information. Intervention Teach patient s/s of hypo/hyperglycemia and how to treat, importance of monitoring blood glucose and diet modifications. Goal written by:  Denny Toro RN     5. Discipline  Clinical Psychology    Goal      Barrier      Intervention      Goal written by:       6. Discipline  Nutrition / Dietetics    Goal  - PO nutrition intake will continue to meet >75% of patients estimated nutritional needs over the next 7 days. Outcome: Met/Continue     Barrier  none known    Intervention  continue po diet; discontinue nutritional supplements    Goal written by:  Duglas Chaudhari RD       Disposition / Discharge Planning:      Follow-up services:  [x] Physical Therapy             [x] Occupational Therapy       [] Speech Therapy           [] Skilled Nursing      [] Medical Social Worker   [] Aide        [] Outpatient      [] vs   [x] Home Health  [x] vs       [] to progress to outpatient       [] with 24-hour supervision       [x] with 24-hour assistance   [x] Jonathan RAMSAY recommendations:  WC-18inch, WC ramp, transfer board   Estimated discharge date:  1/14/20   Discharge Location:  [x] Home  [x] versus    [x] Jonathan Caldwell    [] [address]   [] Other:           Electronic Signatures:      Signature Date Signed   Physical Therapist    PURNIMA Macias PT, DPT  12/31/19 1/02/2020   Occupational Therapist    Sachin [address], MSOTR/L   1/2/2/20   Speech Therapist         Recreational Therapist    Thom Echevarria, CTRS 1/1/20   Nursing    Gema Stephens, JENNY 1/1/20   Dietitidina Infante RD 1/1/20    Clinical Psychologist         Physician    Linda Mireles  1/1/20       Gabby Lee, DARIA  12/31/19         The above information has been reviewed with the patient in a language that they can understand. Opportunity for comments and questions has been provided and a signed attestation has been scanned into the \"media tab\" of the EMR.       Patient Signature: ______________________________________________________    Date Signed: __________________________________________________________

## 2019-12-31 NOTE — PROGRESS NOTES
Problem: Self Care Deficits Care Plan (Adult)  Goal: *Therapy Goal (Edit Goal, Insert Text)  Description  Occupational Therapy Goals   Long Term Goals  Initiated 12/17/19 and to be accomplished within 4 week(s)  to facilitate increased self care independence and strength for return to PLOF and decreased care giver burden:   2. Pt will perform grooming with Mod I.   3. Pt will perform UB bathing with SBA. 4. Pt will perform LB bathing with Tyree. 5. Pt will perform tub/shower transfer <> TTB with Min A.   6. Pt will perform UB dressing with Set-up. 7. Pt will perform LB dressing with Min A.  8. Pt will perform toileting task with Min A.  9. Pt will perform toilet transfer with Min A/CGA. Short Term Goals   Initiated 12/17/19 and to be accomplished within 7 day(s) (1/7/2020) to facilitate increased self care independence and strength for return to PLOF and decreased care giver burden:   1. Pt will perform self-feeding with Mod I.   2. Pt will perform grooming with set-up. ()  3. Pt will perform UB bathing with Mod A. ()  4. Pt will perform LB bathing with Mod A in LRE. ()  5. Pt will perform tub/shower transfer <> TTB with MaxA x 1.- 12/31  6. Pt will perform UB dressing with SBA. - 12/31  7. Pt will perform LB dressing with Mod A using AE as needed. ()  8. Pt will perform toileting task with Max A x 1. ()  9. Pt will perform toilet transfer with MaxA x 1. Outcome Measures:  (12/17/19) Dynamometer  strength: Right= 27.33# (norm=55. 1#) Left= 34.67# (norm=45.7#)  (12/24/19) 9-hole peg test: Right=45.46 seconds ; Left=41.13 seconds       Outcome: Progressing Towards Goal  Goal: *Therapy Goal (Edit Goal, Insert Text)  Outcome: Progressing Towards Goal  Goal: Interventions  Outcome: Progressing Towards Goal  OT WEEKLY PROGRESS NOTE  Patient Name:Stephen RODRIGUEZ Ernestine   Time Spent With Patient  Time In: 0930  Time Out: 1100      Medical Diagnosis:  Status post laminectomy with spinal fusion [Z98.1] Status post laminectomy with spinal fusion     Pain at start of tx: 10/10 pain or discomfort. Pain at stop of tx: 9/10 pain or discomfort. Patient identified with name and :yes  Subjective: 'I twisted my back this morning trying to defecate'         Objective: Pt seen for mid morning bathing and agreed to have a shower       Outcome Measures: Pt to d/c home with [facility]   AROM: B UE's [facility]      COGNITION/PERCEPTION Initial Assessment Weekly Progress Assessment 2019   Premorbid Reading Status Literate   Literate   Premorbid Writing Status [facility]   Arousal/Alertness Generalized responses   Generalized responses   Orientation Level Oriented X4   Oriented X4   Visual Fields (WF reading pt's board )   Intact   Praxis Intact   Intact   Body Scheme Appears intact   Intact   COMPREHENSION MODE Initial Assessment Weekly Progress Assessment 2019   Primary Mode of Comprehension Auditory   Auditory   Hearing Aide None   None   Corrective Lenses Glasses   Glasses   Score 6  6     EXPRESSION Initial Assessment Weekly Progress Assessment 2019   Primary Mode of Expression Verbal Verbal   Score 6 6   Comments         SOCIAL INTERACTION/ PRAGMATICS Initial Assessment Weekly Progress Assessment 2019   Score 5 5   Comments re-direction to task at times re-direction to task at times     PROBLEM SOLVING Initial Assessment Weekly Progress Assessment 2019   Score 4 4   Comments min cues for following spinal precautions min cues for following spinal precautions     MEMORY Initial Assessment Weekly Progress Assessment 2019   Score 5 5   Comments         EATING Initial Assessment Weekly Progress Assessment 2019   Functional Level 5  5   Comments Pt requiring encouragement to demo container mgmt I'ly, requiring set-up over all.  Pt able to demo utensil use I'ly   Pt set-up than self feed      GROOMING Initial Assessment Weekly Progress Assessment 2019   Functional Level 4 6   Tasks completed by patient Washed face     Comments Pt washing face seated EOB with Elida for sitting balance when pt letting go of bed railing to perform grooming task. Grooming  Grooming Assistance : 6 (Modified independent)  Comments: Pt performed grooming tasks from wheel chair level. BATHING Initial Assessment Weekly Progress Assessment 12/31/2019   Functional Level 2  3     Body parts patient bathed Abdomen, Chest, Thigh, left, Thigh, right   Upper Body Bathing  Bathing Assistance, Upper: 5 (Stand-by assistance)  Upper Body : Compensatory technique training  Position Performed: Seated in chair  Adaptive Equipment: Tub bench      . Lower Body Bathing  Bathing Assistance, Lower : 3 (Moderate assistance )  Adaptive Equipment: Long handled sponge  Position Performed: Seated in chair  Adaptive Equipment: Grab bar;Tub bench  Comments:     Comments UB: Pt requiring modA, pt demo'ing washing adbomen/chest with CGA for sitting balance. Pt requiring (A) for thoroughness for UEs and back. LB: Pt requiring maxA for LB bathing seated EOB/supine/sidelying. Pt assisting with washing thighs seated EOB, requiring therapist (A) for remainder. Pt requiring maxA to roll side:side following log roll in bed to perform perineal bathing. Pt used compensatory tech by placing washcloth on thigh than rubbing soap on washcloth with cuing. Pt cues to initiate bathing tasks. LB:  Pt flexing at the waist with posterior  tilt to have buttocks wash dependent, pt washing  tyrell area independently. Pt washed LE with LH sponge following lumbar precaution. TUB/SHOWER TRANSFER INDEPENDENCE Initial Assessment Weekly Progress Assessment 12/31/2019   Score 0 3   Comments NT at this time due to safety and medical status    Functional Transfers  Tub or Shower Type: Shower  Amount of Assistance Required: 3 (Moderate assistance advancing LE's and cuing for proper hand placement)  Adaptive Equipment: Sliding board; Tub transfer bench     UPPER BODY DRESSING/UNDRESSING Initial Assessment Weekly Progress Assessment 12/31/2019   Functional Level 3 5   Items applied/Steps completed Pullover (4 steps)     Comments Pt requiring modA to [facility] hospital gown and modA to shante t-shirt. Pt unable to pull down shirt anterior/posterior. Upper Body Dressing   Dressing Assistance : 5 (Supervision)  Comments: Pt set up with T-shirt and jacket donning shirt and jacket with verbal cues on pulling shirt down at the side to have the back of the shirt fall in place. LOWER BODY DRESSING/UNDRESSING Initial Assessment Weekly Progress Assessment 12/31/2019   Functional Level 1 2   Items applied/Steps completed Sock, left (1 step), Sock, right (1 step), Elastic waist pants (3 steps), Underpants (3 steps)     Comments Pt requiring total asst for compression stockings/sock mgmt with pt seated EOB. Pt rolling side:side with maxA with rehab tech present to asst with brief mgmt and donning pants. Pt attempting to bridge hips to shante pants over hips with pt unable to fully clear sacrum. Lower Body Dressing   Dressing Assistance : 2 (Maximal assistance)  Leg Crossed Method Used: No  Position Performed: Seated in chair;Standing  Don/Doff Anti-Embolic Stockings: 1 (Total assistance)  Comments: Pt assist with threading LE's into pants legs 2/2 time and support pt in standing 2/2 decrease standing balance while brief and pants donned over buttocks. TOILETING Initial Assessment Weekly Progress Assessment 12/31/2019   Functional Level 1 2   Comments Pt incontinent of bladder/bowel, requiring total asst for hygiene and brief mgmt from bed level. Toileting  Toileting Assistance (FIM Score): 2 (Maximal assistance)  Cues: Physical assistance for pants down;Physical assistance for pants up and assist with hygiene 2/2 pt inability to performs hygiene following lumbar precaution.      TOILET TRANSFER INDEPENDENCE Initial Assessment Weekly Progress Assessment 12/31/2019   Transfer score 0 3   Comments NT at this time     Functional Transfers  Toilet Transfer : Other (comment)(Pt stood with support while BSC replace wheel chair.)  Amount of Assistance Required: 3 (Moderate assistance)              ASSESSMENT: Pt has poor re-called with LE self care 2/2 decrease concentration with tasks 2/2 pt focus on what going on around her. Pt demonstrating progress with UE self care when challenge. Progression toward goals: Pt meeting two additional STG's .   []          Improving appropriately and progressing toward goals  [x]          Improving slowly and progressing toward goals  []          Not making progress toward goals and plan of care will be adjusted     PLAN:  Patient continues to benefit from skilled intervention to address the above impairments. Continue treatment per established plan of care. Discharge Recommendations:  Home Health   Further Equipment Recommendations for Discharge: 3:1 commode     Please refer to the flow sheet for vital signs taken during this treatment. After treatment:   [x]  Patient left in no apparent distress sitting up in chair  []  Patient left in no apparent distress in bed  [x]  Call bell left within reach  []  Nursing notified  []  Caregiver present  []  Bed alarm activated    COMMUNICATION/EDUCATION:   [] Home safety education was provided and the patient/caregiver indicated understanding. [] Patient/family have participated as able in goal setting and plan of care. [x] Patient/family agree to work toward stated goals and plan of care. [] Patient understands intent and goals of therapy, but is neutral about his/her participation. [] Patient is unable to participate in goal setting and plan of care. Plan of Care: Please see Care Plan for updated STG/LTGs.    Family Training: TBD  Estimated LOS: 1/14/2020    Jasper REGALADO/JANNET  12/31/2019

## 2019-12-31 NOTE — PROGRESS NOTES
Progress Note    Patient: Miesha Bush MRN: [id number]  CSN: [id number]    YOB: 1955  Age: 62 y.o. Sex: female    DOA: 12/16/2019 LOS:  LOS: 15 days                    Subjective:     Primary Rehab Diagnosis: Impaired Mobility and ADLs secondary to:  1. S/P T10, T9, T8 laminectomy; T7, T8, T9, T10, T11, T12 posterior thoracic fusion; segmental spinal instrumentation; K2M Davis type, T7-T8, T11-T12 (12/11/2019 - Dr. Michelle Torres)  2. History of acute compression fractures of body of thoracic vertebrae (T9 and T10) due to fall    Pt seen and examined review chart   Review of systems  General: No fevers or chills. on o2 By NC pt sitting on chair at PT  Cardiovascular: No chest pain or pressure. No palpitations. Pulmonary: No shortness of breath, cough or wheeze h/o sarcoidosis . Gastrointestinal: No abdominal pain, nausea, vomiting or diarrhea. Genitourinary: No urinary frequency, urgency, hesitancy or dysuria. Musculoskeletal: No joint or muscle pain, no back pain, no recent trauma. Neurologic: No headache, no seizure    Objective:     Physical Exam:  Visit Vitals  /62 (BP 1 Location: Left arm, BP Patient Position: Sitting)   Pulse 81   Temp 97.6 °F (36.4 °C)   Resp 18   Wt 42.9 kg (94 lb 8 oz)   SpO2 93%   Breastfeeding No   BMI 15.73 kg/m²        General:         Alert, cooperative, no acute distress    HEENT: NC, Atraumatic. PERRLA, anicteric sclerae. Lungs: CTA Bilaterally. No Wheezing/Rhonchi/Rales. Heart:  Regular  rhythm,  No murmur, No Rubs, No Gallops  Abdomen: Soft, Non distended, Non tender.    Extremities: No lower limb edema   Psych:   . Not anxious or agitated. Neurologic:  CN 2-12 grossly intact, Alert and oriented X 3. Cranial nerves II-XII are grossly intact.  No gross sensory deficit.  Motor strength is 4-/5 on BUE, 2+/5 on the RLE (except for 1/5 on the right ankle), 2-/5 on the LLE (except for 2+/5 on the left knee).     Intake and Output:  Current Shift: No intake/output data recorded. Last three shifts:  No intake/output data recorded. Labs: Results:       Chemistry Recent Labs     12/30/19 0618   *      K 4.3      CO2 30   BUN 20*   CREA 0.85   CA 8.5   AGAP 7   BUCR 24*      CBC w/Diff Recent Labs     12/30/19 0618   HGB 9.8*   HCT 31.4*      Cardiac Enzymes No results for input(s): CPK, CKND1, SANTOS in the last 72 hours. No lab exists for component: CKRMB, TROIP   Coagulation No results for input(s): PTP, INR, APTT, INREXT in the last 72 hours. Lipid Panel Lab Results   Component Value Date/Time    Cholesterol, total 141 09/30/2019 12:00 AM    HDL Cholesterol 39 (L) 09/30/2019 12:00 AM    LDL, calculated 61 09/30/2019 12:00 AM    VLDL, calculated 41 (H) 09/30/2019 12:00 AM    Triglyceride 204 (H) 09/30/2019 12:00 AM    CHOL/HDL Ratio 3.5 11/06/2016 04:18 AM      BNP No results for input(s): BNPP in the last 72 hours. Liver Enzymes No results for input(s): TP, ALB, TBIL, AP, SGOT, GPT in the last 72 hours.     No lab exists for component: DBIL   Thyroid Studies Lab Results   Component Value Date/Time    TSH 0.74 12/11/2019 05:37 PM          Procedures/imaging: see electronic medical records for all procedures/Xrays and details which were not copied into this note but were reviewed prior to creation of Plan    Medications:   Current Facility-Administered Medications   Medication Dose Route Frequency    insulin glargine (LANTUS) injection 25 Units  25 Units SubCUTAneous ACB    insulin lispro (HUMALOG) injection 4 Units  4 Units SubCUTAneous TIDAC    lubiPROStone (AMITIZA) capsule 24 mcg  24 mcg Oral DAILY WITH BREAKFAST    guaiFENesin ER (MUCINEX) tablet 600 mg  600 mg Oral Q12H    acetaminophen (TYLENOL) tablet 650 mg  650 mg Oral Q4H PRN    bisacodyl (DULCOLAX) tablet 10 mg  10 mg Oral Q48H PRN    ferrous sulfate tablet 325 mg  1 Tab Oral DAILY WITH BREAKFAST    ascorbic acid (vitamin C) (VITAMIN C) tablet 250 mg  250 mg Oral DAILY WITH BREAKFAST    insulin lispro (HUMALOG) injection   SubCUTAneous TIDAC    senna-docusate (PERICOLACE) 8.6-50 mg per tablet 2 Tab  2 Tab Oral PCD    methadone (DOLOPHINE) tablet 20 mg  20 mg Oral DAILY    oxyCODONE-acetaminophen (PERCOCET 10)  mg per tablet 1 Tab  1 Tab Oral Q4H PRN    predniSONE (DELTASONE) tablet 30 mg  30 mg Oral DAILY WITH BREAKFAST    famotidine (PEPCID) tablet 20 mg  20 mg Oral BID    rosuvastatin (CRESTOR) tablet 5 mg  5 mg Oral QHS    albuterol (PROVENTIL VENTOLIN) nebulizer solution 2.5 mg  2.5 mg Nebulization Q4H PRN    levothyroxine (SYNTHROID) tablet 75 mcg  75 mcg Oral 6am    cholecalciferol (VITAMIN D3) (400 Units /10 mcg) tablet 5 Tab  2,000 Units Oral DAILY    calcium citrate tablet 200 mg [elemental]  200 mg Oral BID    divalproex DR (DEPAKOTE) tablet 500 mg  500 mg Oral QHS    fluticasone-vilanterol (BREO ELLIPTA) 100mcg-25mcg/puff  1 Puff Inhalation DAILY    oxybutynin (DITROPAN) tablet 5 mg  5 mg Oral BID    allopurinol (ZYLOPRIM) tablet 100 mg  100 mg Oral DAILY    sertraline (ZOLOFT) tablet 50 mg  50 mg Oral DAILY    methocarbamol (ROBAXIN) tablet 500 mg  500 mg Oral Q8H    aspirin chewable tablet 81 mg  81 mg Oral DAILY WITH BREAKFAST    docusate sodium (COLACE) capsule 100 mg  100 mg Oral DAILY AFTER BREAKFAST       Assessment/Plan     Principal Problem:    Status post laminectomy with spinal fusion (12/11/2019)      Overview: S/P T10, T9, T8 laminectomy; T7, T8, T9, T10, T11, T12 posterior thoracic       fusion; segmental spinal instrumentation; K2M Davis type, T7-T8, T11-T12       (12/11/2019 - Dr. Jennifer Arora)    Active Problems:    Chronic obstructive pulmonary disease (COPD) ([facility] [address] ()      Overview: SOB, on [place] O2      Recent admission with psychosis      Hypertensive heart disease without heart failure ()      Overview: Better controlled      Type 2 diabetes mellitus with diabetic neuropathy, with long-term current use of insulin (HCC) ()      Overview: HbA1c (12/11/2019) = 7.1      Hypoxemia requiring supplemental oxygen (12/29/2014)      Impaired mobility and ADLs (12/11/2019)      Spinal cord compression ([address] (12/11/2019)      Compression fracture of body of thoracic vertebra ([address] (12/11/2019)      Acute blood loss as cause of postoperative anemia (12/12/2019)      History of fracture due to fall (12/11/2019)      Chronic respiratory failure with hypoxia (HCC) ()      Overview: On home oxygen inhalation at 3 LPM via NC      Opioid dependence ([facility] [address] ()      Overview: On Methadone      Plan  S/P T10, T9, T8 laminectomy; T7, T8, T9, T10, T11, T12 posterior thoracic fusion; segmental spinal instrumentation; K2M [place] type, T7-T8, T11-T12 (12/11/2019 - Dr. Dacia Maguire);  History of acute compression fractures of body of thoracic vertebrae (T9 and T10) due to fall  Thoracic spinal precautions  Calcium citrate 200 mg PO BID   Cholecalciferol 2,000 units PO once daily    Acute Postoperative Blood Loss Anemia   FeSO4 325 mg PO once daily with breakfast   Ascorbic Acid 250 mg PO once daily with breakfast     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 congestive heart failure  Metolazone was not given during the patient's hospital stay at [facility]     Chronic obstructive pulmonary disease; Chronic respiratory failure with hypoxemia requiring supplemental oxygen (3 LPM)  Fluticasone-Vilanterol 100-25 1 puff inhaled once daily   O2 inhalation at 3 LPM via nasal cannula continuous    Opioid dependence  Continue Methadone 20 mg PO once daily     Type 2 diabetes mellitus with stage 3 chronic kidney disease  Insulin glargine 25 units PO once daily before breakfast   Insulin lispro 4 units SC TID AC    Insulin lispro sliding scale SC TID AC only     Cough  Continue Guaifenesin  mg PO q 12 hr  Might need to repeat CXR    Hayden Khanna MD  12/31/2019 6:39 PM

## 2019-12-31 NOTE — PROGRESS NOTES
Problem: Mobility Impaired (Adult and Pediatric)  Goal: *Therapy Goal (Edit Goal, Insert Text)  Description  Physical Therapy Goals: STG  Initiated 12/17/2019, re-assessed 12/30/19 and to be accomplished within 7 day(s) on 01/06/20:  1. Patient will move from supine to sit and sit to supine , scoot up and down and roll side to side in bed with moderate assistance . (mod A rolling; max A sup<> sit)   Update: pt will roll side to side in bed with min A, and move from supine to sit and sit to supine in bed with modA  2. Patient will transfer from bed to chair and chair to bed with maximal assistance with one person assist using the least restrictive device. (MET, mod A w/ slide board)   Update: pt will transfer bed <> w/c with min A and lateral transfer board  3. Patient will perform sit to stand with maximal assistance with one person assist using appropriate AD. (unable at this time, total assist for attempting at p-bar)  4. Patient will perform static sitting balance without UE support for at least 2 minutes, demonstrating appropriate upright and midline posture at supervision level for ease of preparation for transfers. (MET, up to 5 mins with SBA/close Sup)   Update: Pt will demonstrate dynamic sitting balance for up to 15 mins with supervision to assist with ADL's and transfers  5. Patient will perform wheelchair mobility moderate assist for 150 ft distance over even surfaces. (MET, SBA/Sup for up to 200 ft)    Physical Therapy Goals: LTG  Initiated 12/17/2019, revised 12/30/19 and to be accomplished within 28 day(s) on 01/14/2020:   1. Patient will move from supine to sit and sit to supine with min A, and roll side to side in bed with contact guard assist.   2.  Patient will transfer from bed to chair and chair to bed with contact guard assist using lateral transfer board. 3.  Patient will perform sit to stand with moderate assistance and least restrictive device.   4.  Patient will participate in gait assessment if and when appropriate. 5.  Patient will perform wheelchair mobility over even surfaces at supervision level for at least 150 ft, including negotiation of doorways. 6.  Patient will perform wheelchair mobility up/down ramp, uneven sidewalk min A level. Outcome: Progressing Towards Goal   PHYSICAL THERAPY TREATMENT    Patient: Artemio Sinha (65 y.o. female)  Date: 2019  Diagnosis: Status post laminectomy with spinal fusion [Z98.1] Status post laminectomy with spinal fusion  Precautions:    Chart, physical therapy assessment, plan of care and goals were reviewed. Time In: 1430  Time Out: 1600  Patient Seen For: PM;Balance activities; Therapeutic exercise;Transfer training; Wheelchair mobility  Pain:  Pt pain was reported as  6 pre-treatment. Pt pain was reported as 6 post-treatment. Intervention: Pain medication administered    Patient identified with name and : yes    SUBJECTIVE:     I get very anxious when I stand up.     OBJECTIVE DATA SUMMARY:   Objective:     GROSS ASSESSMENT Daily Assessment    AROM: Generally decreased, functional  Strength: Generally decreased, functional         TRANSFERS Daily Assessment    Transfer Type: Lateral with transfer board  Transfer Assistance : 3 (Moderate assistance )  Sit to Stand Assistance: Maximum assistance  Car Transfers: Not tested       GAIT Daily Assessment    Amount of Assistance: 0 (Not tested)       STEPS or STAIRS Daily Assessment    Steps/Stairs Ambulated (#): (0)       BALANCE Daily Assessment    Sitting - Static: Fair (occasional)  Sitting - Dynamic: Fair (occasional)  Standing - Static: Poor       WHEELCHAIR MOBILITY Daily Assessment    Able to Propel (ft): (100 ft)  Functional Level: (5)  Curbs/Ramps Assist Required (FIM Score): 0 (Not tested)  Wheelchair Setup Assist Required : 0 (Not tested)  Wheelchair Management: Manages left brake;Manages right brake       THERAPEUTIC EXERCISES Daily Assessment            Neuro Re-Education:  Patient was challenged in the parallel bars with standing attempts. First attempt, patient was able to push up from wheelchair and hold transition to parallel bar with maximum to moderate assistance. Second trial, patient had difficulty with complete stand and reported that she began having a BM when she stood up. Patient was returned to wheelchair safely and offered to use the bathroom in the gym which she refused. Patient was taken back to room and she transferred from the wheelchair to the bedside commode using a transfer board and moderate assistance as well as a gait belt. ASSESSMENT:  Patient is seen for deficits in strength, mobility, transfers, ambulation, balance and safety. Patient is agreeable to skilled session to address current weakness. Primary focus of current session was transfers using the transfer board from the wheelchair <> commode. Patient required verbal cues and tactile cues for set up of transfer board, foot and hand placement prior to and during transfers. Patient exhibits increased anxiety going back to the wheelchair after toileting as she reported increased fatigue and moderate shortness of breath. She did not have her 02 on during the transfer as she refused to use it, however she was agreeable to placing it back on once she returned to the wheelchair. Patient is easily distracted, required re-direction throughout session to concentrate on task at hand. Sit to stand transfers in parallel bars x 1 with maximum assistance x 45 seconds and 2nd trial, patient had difficulty with complete stand and required getting back to chair 2/2 having a BM. Continue to address current deficits for patient's improved functional independence.   Progression toward goals:  []      Improving appropriately and progressing toward goals  []      Improving slowly and progressing toward goals  []      Not making progress toward goals and plan of care will be adjusted     PLAN:  Patient continues to benefit from skilled intervention to address the above impairments. Continue treatment per established plan of care. Discharge Recommendations: Home health vs SNF  Further Equipment Recommendations for Discharge:  WC-18 inch, WC ramp, transfer board     Estimated LOS: 1/14/19    Activity Tolerance: Tolerates session fair  Please refer to the flowsheet for vital signs taken during this treatment.     After treatment:   Patient transferred back to wheelchair after toileting using transfer board      Christopher Arrieta  12/31/2019

## 2019-12-31 NOTE — PROGRESS NOTES
[facility] PHYSICAL REHABILITATION  [address] [place] [address], [address]     INPATIENT REHABILITATION  DAILY PROGRESS NOTE     Date: 12/30/2019    Name: Carrie Nolan Age / Sex: 62 y.o. / female   CSN: [id number] MRN: [id number]   [facility] Date: 12/16/2019 Length of Stay: 14 days     Primary Rehab Diagnosis: Impaired Mobility and ADLs secondary to:  1. S/P T10, T9, T8 laminectomy; T7, T8, T9, T10, T11, T12 posterior thoracic fusion; segmental spinal instrumentation; K2M [place] type, T7-T8, T11-T12 (12/11/2019 - Dr. Frandy Das)  2.  History of acute compression fractures of body of thoracic vertebrae (T9 and T10) due to fall      Subjective:     Patient sitting in bed in NAD, awake, alert, follows commands    Objective:     Vital Signs:  Patient Vitals for the past 24 hrs:   BP Temp Pulse Resp SpO2   12/30/19 1537 139/77 98.6 °F (37 °C) 82 18 99 %   12/30/19 0739 124/74 98 °F (36.7 °C) 76 16 100 %   12/29/19 2100 131/80 97 °F (36.1 °C) 81 18 100 %        Physical Examination:  General:  Awake, alert  Cardiovascular:  S1S2+, RRR  Pulmonary:  CTA b/l  GI:  Soft, BS+, NT, ND  Extremities:  No edema      Current Medications:  Current Facility-Administered Medications   Medication Dose Route Frequency    insulin glargine (LANTUS) injection 25 Units  25 Units SubCUTAneous ACB    insulin lispro (HUMALOG) injection 4 Units  4 Units SubCUTAneous TIDAC    lubiPROStone (AMITIZA) capsule 24 mcg  24 mcg Oral DAILY WITH BREAKFAST    guaiFENesin ER (MUCINEX) tablet 600 mg  600 mg Oral Q12H    acetaminophen (TYLENOL) tablet 650 mg  650 mg Oral Q4H PRN    bisacodyl (DULCOLAX) tablet 10 mg  10 mg Oral Q48H PRN    ferrous sulfate tablet 325 mg  1 Tab Oral DAILY WITH BREAKFAST    ascorbic acid (vitamin C) (VITAMIN C) tablet 250 mg  250 mg Oral DAILY WITH BREAKFAST    insulin lispro (HUMALOG) injection   SubCUTAneous TIDAC    senna-docusate (PERICOLACE) 8.6-50 mg per tablet 2 Tab  2 Tab Oral PCD    methadone (DOLOPHINE) tablet 20 mg  20 mg Oral DAILY    oxyCODONE-acetaminophen (PERCOCET 10)  mg per tablet 1 Tab  1 Tab Oral Q4H PRN    predniSONE (DELTASONE) tablet 30 mg  30 mg Oral DAILY WITH BREAKFAST    famotidine (PEPCID) tablet 20 mg  20 mg Oral BID    rosuvastatin (CRESTOR) tablet 5 mg  5 mg Oral QHS    albuterol (PROVENTIL VENTOLIN) nebulizer solution 2.5 mg  2.5 mg Nebulization Q4H PRN    levothyroxine (SYNTHROID) tablet 75 mcg  75 mcg Oral 6am    cholecalciferol (VITAMIN D3) (400 Units /10 mcg) tablet 5 Tab  2,000 Units Oral DAILY    calcium citrate tablet 200 mg [elemental]  200 mg Oral BID    divalproex DR (DEPAKOTE) tablet 500 mg  500 mg Oral QHS    fluticasone-vilanterol (BREO ELLIPTA) 100mcg-25mcg/puff  1 Puff Inhalation DAILY    oxybutynin (DITROPAN) tablet 5 mg  5 mg Oral BID    allopurinol (ZYLOPRIM) tablet 100 mg  100 mg Oral DAILY    sertraline (ZOLOFT) tablet 50 mg  50 mg Oral DAILY    methocarbamol (ROBAXIN) tablet 500 mg  500 mg Oral Q8H    aspirin chewable tablet 81 mg  81 mg Oral DAILY WITH BREAKFAST    docusate sodium (COLACE) capsule 100 mg  100 mg Oral DAILY AFTER BREAKFAST       Allergies:   Allergies   Allergen Reactions    Moxifloxacin Rash    Pcn [Penicillins] Swelling    Sulfa (Sulfonamide Antibiotics) Swelling       Functional Progress:    PHYSICAL THERAPY    ON ADMISSION MOST RECENT   Wheelchair Mobility/Management  Able to Propel (ft): 140 feet(max A for steering)  Functional Level: 2  Curbs/Ramps Assist Required (FIM Score): 0 (Not tested)  Wheelchair Setup Assist Required : 2 (Maximal assistance)  Wheelchair Management: Manages left brake(needing assist with brakes) Wheelchair Mobility/Management  Able to Propel (ft): 160 feet  Functional Level: 5(using B UE's)  Curbs/Ramps Assist Required (FIM Score): 0 (Not tested)  Wheelchair Setup Assist Required : 4 (Minimal assistance)(pt can position w/c and manage arm rests)  Wheelchair Management: Manages left brake, Manages right brake, Manages left armrest, Manages right armrest(needs assist for footrests)     Gait  Amount of Assistance: 0 (Not tested)  Distance (ft): 0 Feet (ft)  Assistive Device: (N/A) Gait  Amount of Assistance: 1 (Dependent/total assistance)  Distance (ft): 0 Feet (ft)  Assistive Device: (N/A)     Balance-Sitting/Standing  Sitting - Static: Fair (occasional)  Sitting - Dynamic: Fair (occasional), Occassional, Poor (constant support)  Standing - Static: Poor, Constant support  Standing - Dynamic : (not tested) Balance-Sitting/Standing  Sitting - Static: Fair (occasional)(+)  Sitting - Dynamic: Fair (occasional)  Standing - Static: Poor  Standing - Dynamic : Impaired     Bed/Mat Mobility  Rolling Right : 2 (Maximal assistance)  Rolling Left : 2 (Maximal assistance)  Supine to Sit : 2 (Maximal assistance)  Sit to Supine : 2 (Maximal assistance) Bed/Mat Mobility  Rolling Right : 4 (Minimal assistance)(on mat table w/out SR's; assist for LE's)  Rolling Left : 4 (Minimal assistance)(on mat table w/out SR's; assist for LE's)  Supine to Sit : 3 (Moderate assistance)(for LE's mgm't & to initiate lift off or trunk)  Sit to Supine : 2 (Maximal assistance)(for LE mgm't.)     Transfers  Transfer Type: Lateral with transfer board  Transfer Assistance : 1 (Total assistance)(Max A x 2 for safety, sequencing, appropriate ant trunk )  Sit to Stand Assistance: Total assistance  Car Transfers: Not tested  Car Type: N/A Transfers  Transfer Type: Lateral with transfer board  Transfer Assistance : 3 (Moderate assistance )  Sit to Stand Assistance:  Total assistance(used standing frame to stand 10 mins)  Car Transfers: Not tested  Car Type: (N/A)     Steps or Stairs  Steps/Stairs Ambulated (#): 0  Level of Assist : 0 (Not tested)  Rail Use: (N/A) Steps or Stairs  Steps/Stairs Ambulated (#): 0  Level of Assist : 0 (Not tested)  Rail Use: (N/A)         Lab/Data Review:  Recent Results (from the past 24 hour(s))   GLUCOSE, POC Collection Time: 12/29/19  8:59 PM   Result Value Ref Range    Glucose (POC) 109 70 - 534 mg/dL   METABOLIC PANEL, BASIC    Collection Time: 12/30/19  6:18 AM   Result Value Ref Range    Sodium 141 136 - 145 mmol/L    Potassium 4.3 3.5 - 5.5 mmol/L    Chloride 104 100 - 111 mmol/L    CO2 30 21 - 32 mmol/L    Anion gap 7 3.0 - 18 mmol/L    Glucose 157 (H) 74 - 99 mg/dL    BUN 20 (H) 7.0 - 18 MG/DL    Creatinine 0.85 0.6 - 1.3 MG/DL    BUN/Creatinine ratio 24 (H) 12 - 20      GFR est AA >60 >60 ml/min/1.73m2    GFR est non-AA >60 >60 ml/min/1.73m2    Calcium 8.5 8.5 - 10.1 MG/DL   HGB & HCT    Collection Time: 12/30/19  6:18 AM   Result Value Ref Range    HGB 9.8 (L) 12.0 - 16.0 g/dL    HCT 31.4 (L) 35.0 - 45.0 %   GLUCOSE, POC    Collection Time: 12/30/19  7:39 AM   Result Value Ref Range    Glucose (POC) 123 (H) 70 - 110 mg/dL   GLUCOSE, POC    Collection Time: 12/30/19 11:51 AM   Result Value Ref Range    Glucose (POC) 149 (H) 70 - 110 mg/dL   GLUCOSE, POC    Collection Time: 12/30/19  5:15 PM   Result Value Ref Range    Glucose (POC) 234 (H) 70 - 110 mg/dL           Assessment:     Primary Rehabilitation Diagnosis  1. Impaired Mobility and ADLs  2. S/P T10, T9, T8 laminectomy; T7, T8, T9, T10, T11, T12 posterior thoracic fusion; segmental spinal instrumentation; K2M [place] type, T7-T8, T11-T12 (12/11/2019 - Dr. Dacia Maguire)  3.  History of acute compression fractures of body of thoracic vertebrae (T9 and T10) due to fall     Comorbidities   Diabetic neuropathy associated with type 2 diabetes mellitus (HCC) E11.40    Venous insufficiency I87.2    Obesity, Class I, BMI 30-34.9 E66.9    Chronic back pain M54.9, G89.29    Generalized osteoarthritis of multiple sites M15.9    Bipolar affective disorder  F31.9    Abnormally low high density lipoprotein (HDL) cholesterol with hypertriglyceridemia E78.6, Q89.7    Diastolic dysfunction without heart failure I51.89    Chronic obstructive pulmonary disease (COPD) J44.9    Hypertensive heart disease without heart failure I11.9    Depression F32.9    History of back injury Z87.828    Type 2 diabetes mellitus with diabetic neuropathy, with long-term current use of insulin  E11.40, Z79.4    Anxiety F41.9    Wears glasses Z97.3    History of hepatitis C Z86.19    Sickle cell trait D57.3    History of acute renal failure Z87.448    Hypothyroidism E03.9    Recurrent genital herpes A60.00    Sarcoidosis D86.9    Abscess of right arm L02.413    Hypoxemia requiring supplemental oxygen R09.02, Z99.81    Abnormal nuclear stress test R94.39    Cellulitis and abscess of hand L03.119, L02.519    Cellulitis and abscess of foot L03.119, L02.619    Cellulitis of arm, right L03. 113    Olecranon bursitis of right elbow M70.21    Contusion of left elbow S50. 02XA    Intravenous drug user F19.90    Right Achilles tendinitis M76.61    History of penicillin allergy Z88.0    Falls frequently R29.6    Gastroesophageal reflux disease with hiatal hernia K21.9, K44.9    Memory difficulty R41.3    Mixed connective tissue disease  M35.1    Hyperuricemia E79.0    History of vitamin D deficiency Z86.39    Urge urinary incontinence N39.41    Acute blood loss as cause of postoperative anemia D62    History of fracture due to fall Z87.81    Chronic respiratory failure with hypoxia  J96.11    Cellulitis of right forearm L03. 113    Gout M10.9    Menopause Z78.0    Long term current use of aspirin Z79.82    Opioid dependence  F11.20    Tobacco use disorder F17.200        Plan:     1. Justification for continued stay: Good progression towards established rehabilitation goals. 2. Medical Issues being followed closely:    [x]  Fall and safety precautions     []  Wound Care     [x]  Bowel and Bladder Function     [x]  Fluid Electrolyte and Nutrition Balance     []  Pain Control      3.  Issues that 24 hour rehabilitation nursing is following:    [x]  Fall and safety precautions []  Wound Care     [x]  Bowel and Bladder Function     [x]  Fluid Electrolyte and Nutrition Balance     []  Pain Control      [x]  Assistance with and education on in-room safety with transfers to and from the bed, wheelchair, toilet and shower. 4. Acute rehabilitation plan of care:    [x]  Continue current care and rehab. [x]  Physical Therapy           [x]  Occupational Therapy           []  Speech Therapy     []  Hold Rehab until further notice     5. Medications:    [x]  MAR Reviewed     [x]  Continue Present Medications     6. DVT Prophylaxis:      []  Lovenox     []  Unfractionated Heparin     []  Coumadin     []  NOAC     [x]  ROBERT Stockings     []  Sequential Compression Device     []  None     7. Orders:   > S/P T10, T9, T8 laminectomy; T7, T8, T9, T10, T11, T12 posterior thoracic fusion; segmental spinal instrumentation; K2M [place] type, T7-T8, T11-T12 (12/11/2019 - Dr. Americo Page);  History of acute compression fractures of body of thoracic vertebrae (T9 and T10) due to fall   >thoracic spinal precautions    > Acute Postoperative Blood Loss Anemia   > iron, vit C    > Benign hypertensive heart and kidney disease with stage 3 chronic kidney disease without congestive heart failure   > monitor off diuretics    > Chronic obstructive pulmonary disease; Chronic respiratory failure with hypoxemia requiring supplemental oxygen (3 LPM)   > Continue:    > breo ellipta, O2    > Opioid dependence   > on methadone    > Type 2 diabetes mellitus with stage 3 chronic kidney disease   Lantus, ssi, premeal insulin      > Constipation   >on bowel regimen    > Analgesia   > Continue:    > Acetaminophen 650 mg PO q 4 hr PRN for pain level less than 5/10    > Methocarbamol 500 mg PO q 8 hr    > Percocet 10/325 1 tab PO q 4 hr PRN for pain level greater than 4/10     > Diet:   > Specifications: Cardiac, diabetic consistent carb 1800 kcal, no concentrated sweets, low purine   > Solids (consistency): Regular > Liquids (consistency):  Thin       D/w patient, d/w RN    Signed:        December 30, 2019

## 2019-12-31 NOTE — PROGRESS NOTES
Problem: Diabetes Self-Management  Goal: *Disease process and treatment process  Description  Define diabetes and identify own type of diabetes; list 3 options for treating diabetes. Outcome: Progressing Towards Goal  Goal: *Incorporating nutritional management into lifestyle  Description  Describe effect of type, amount and timing of food on blood glucose; list 3 methods for planning meals. Outcome: Progressing Towards Goal  Goal: *Incorporating physical activity into lifestyle  Description  State effect of exercise on blood glucose levels. Outcome: Progressing Towards Goal  Goal: *Developing strategies to promote health/change behavior  Description  Define the ABC's of diabetes; identify appropriate screenings, schedule and personal plan for screenings. Outcome: Progressing Towards Goal  Goal: *Using medications safely  Description  State effect of diabetes medications on diabetes; name diabetes medication taking, action and side effects. Outcome: Progressing Towards Goal  Goal: *Monitoring blood glucose, interpreting and using results  Description  Identify recommended blood glucose targets  and personal targets. Outcome: Progressing Towards Goal  Goal: *Prevention, detection, treatment of acute complications  Description  List symptoms of hyper- and hypoglycemia; describe how to treat low blood sugar and actions for lowering  high blood glucose level. Outcome: Progressing Towards Goal  Goal: *Prevention, detection and treatment of chronic complications  Description  Define the natural course of diabetes and describe the relationship of blood glucose levels to long term complications of diabetes.   Outcome: Progressing Towards Goal  Goal: *Developing strategies to address psychosocial issues  Description  Describe feelings about living with diabetes; identify support needed and support network  Outcome: Progressing Towards Goal  Goal: *Insulin pump training  Outcome: Progressing Towards Goal  Goal: *Sick day guidelines  Outcome: Progressing Towards Goal  Goal: *Patient Specific Goal (EDIT GOAL, INSERT TEXT)  Outcome: Progressing Towards Goal     Problem: Patient Education: Go to Patient Education Activity  Goal: Patient/Family Education  Outcome: Progressing Towards Goal     Problem: Falls - Risk of  Goal: *Absence of Falls  Description  Document Cristi Glaser Fall Risk and appropriate interventions in the flowsheet. Outcome: Progressing Towards Goal  Note: Fall Risk Interventions:  Mobility Interventions: Assess mobility with egress test    Mentation Interventions: Adequate sleep, hydration, pain control, Bed/chair exit alarm    Medication Interventions: Bed/chair exit alarm, Patient to call before getting OOB    Elimination Interventions: Bed/chair exit alarm, Call light in reach, Patient to call for help with toileting needs    History of Falls Interventions: Bed/chair exit alarm         Problem: Patient Education: Go to Patient Education Activity  Goal: Patient/Family Education  Outcome: Progressing Towards Goal     Problem: Pressure Injury - Risk of  Goal: *Prevention of pressure injury  Description  Document Florian Scale and appropriate interventions in the flowsheet.   Outcome: Progressing Towards Goal  Note: Pressure Injury Interventions:  Sensory Interventions: Assess changes in LOC    Moisture Interventions: Absorbent underpads    Activity Interventions: Increase time out of bed, Pressure redistribution bed/mattress(bed type)    Mobility Interventions: HOB 30 degrees or less    Nutrition Interventions: Document food/fluid/supplement intake    Friction and Shear Interventions: HOB 30 degrees or less                Problem: Patient Education: Go to Patient Education Activity  Goal: Patient/Family Education  Outcome: Progressing Towards Goal     Problem: Infection - Risk of, Surgical Site Infection  Goal: *Absence of surgical site infection signs and symptoms  Outcome: Progressing Towards Goal     Problem: Patient Education: Go to Patient Education Activity  Goal: Patient/Family Education  Outcome: Progressing Towards Goal     Problem: Pain  Goal: *Control of Pain  Outcome: Progressing Towards Goal  Goal: *PALLIATIVE CARE:  Alleviation of Pain  Outcome: Progressing Towards Goal     Problem: Patient Education: Go to Patient Education Activity  Goal: Patient/Family Education  Outcome: Progressing Towards Goal     Problem: Nutrition Deficit  Goal: *Optimize nutritional status  Outcome: Progressing Towards Goal     Problem: Injury - Risk of, Adverse Drug Event  Goal: *Absence of adverse drug events  Outcome: Progressing Towards Goal  Goal: *Absence of medication errors  Outcome: Progressing Towards Goal  Goal: *Knowledge of prescribed medications  Outcome: Progressing Towards Goal     Problem: Patient Education: Go to Patient Education Activity  Goal: Patient/Family Education  Outcome: Progressing Towards Goal     Problem: Inpatient Rehab (Adult)  Goal: *LTG: Avoids injury/falls 100% of time related to deficits  Outcome: Progressing Towards Goal  Goal: *LTG: Avoids infection 100% of time related to deficits  Outcome: Progressing Towards Goal  Goal: *LTG: Verbalize understanding of diagnosis and risk factors for recurring stroke  Outcome: Progressing Towards Goal  Goal: *LTG: Absence of DVT during hospitalization  Outcome: Progressing Towards Goal  Goal: *LTG: Maintains Skin Integrity With No Evidence of Pressure Injury 100% of Time  Outcome: Progressing Towards Goal  Goal: Interventions  Outcome: Progressing Towards Goal     Problem: Patient Education: Go to Patient Education Activity  Goal: Patient/Family Education  Outcome: Progressing Towards Goal

## 2020-01-01 ENCOUNTER — HOME CARE VISIT (OUTPATIENT)
Dept: SCHEDULING | Facility: HOME HEALTH | Age: 65
End: 2020-01-01
Payer: MEDICAID

## 2020-01-01 ENCOUNTER — APPOINTMENT (OUTPATIENT)
Dept: CT IMAGING | Age: 65
DRG: 720 | End: 2020-01-01
Attending: INTERNAL MEDICINE
Payer: MEDICAID

## 2020-01-01 ENCOUNTER — ANESTHESIA (OUTPATIENT)
Dept: SURGERY | Age: 65
DRG: 710 | End: 2020-01-01
Payer: MEDICAID

## 2020-01-01 ENCOUNTER — APPOINTMENT (OUTPATIENT)
Dept: NON INVASIVE DIAGNOSTICS | Age: 65
DRG: 710 | End: 2020-01-01
Attending: INTERNAL MEDICINE
Payer: MEDICAID

## 2020-01-01 ENCOUNTER — PATIENT OUTREACH (OUTPATIENT)
Dept: CASE MANAGEMENT | Age: 65
End: 2020-01-01

## 2020-01-01 ENCOUNTER — APPOINTMENT (OUTPATIENT)
Dept: GENERAL RADIOLOGY | Age: 65
DRG: 710 | End: 2020-01-01
Attending: PHYSICIAN ASSISTANT
Payer: MEDICAID

## 2020-01-01 ENCOUNTER — OFFICE VISIT (OUTPATIENT)
Dept: SURGERY | Age: 65
End: 2020-01-01

## 2020-01-01 ENCOUNTER — APPOINTMENT (OUTPATIENT)
Dept: GENERAL RADIOLOGY | Age: 65
DRG: 720 | End: 2020-01-01
Attending: INTERNAL MEDICINE
Payer: MEDICAID

## 2020-01-01 ENCOUNTER — APPOINTMENT (OUTPATIENT)
Dept: GENERAL RADIOLOGY | Age: 65
DRG: 710 | End: 2020-01-01
Attending: EMERGENCY MEDICINE
Payer: MEDICAID

## 2020-01-01 ENCOUNTER — HOME CARE VISIT (OUTPATIENT)
Dept: HOME HEALTH SERVICES | Facility: HOME HEALTH | Age: 65
End: 2020-01-01
Payer: MEDICAID

## 2020-01-01 ENCOUNTER — HOSPITAL ENCOUNTER (INPATIENT)
Age: 65
LOS: 22 days | Discharge: HOME HEALTH | DRG: 710 | End: 2020-03-22
Attending: EMERGENCY MEDICINE | Admitting: INTERNAL MEDICINE
Payer: MEDICAID

## 2020-01-01 ENCOUNTER — TELEPHONE (OUTPATIENT)
Dept: PULMONOLOGY | Age: 65
End: 2020-01-01

## 2020-01-01 ENCOUNTER — APPOINTMENT (OUTPATIENT)
Dept: GENERAL RADIOLOGY | Age: 65
DRG: 710 | End: 2020-01-01
Attending: INTERNAL MEDICINE
Payer: MEDICAID

## 2020-01-01 ENCOUNTER — TELEPHONE (OUTPATIENT)
Dept: ORTHOPEDIC SURGERY | Age: 65
End: 2020-01-01

## 2020-01-01 ENCOUNTER — HOSPITAL ENCOUNTER (INPATIENT)
Age: 65
LOS: 7 days | Discharge: HOME HEALTH | DRG: 190 | End: 2020-05-28
Attending: EMERGENCY MEDICINE | Admitting: HOSPITALIST
Payer: MEDICAID

## 2020-01-01 ENCOUNTER — OFFICE VISIT (OUTPATIENT)
Dept: PULMONOLOGY | Age: 65
End: 2020-01-01

## 2020-01-01 ENCOUNTER — HOME CARE VISIT (OUTPATIENT)
Dept: HOME HEALTH SERVICES | Facility: HOME HEALTH | Age: 65
End: 2020-01-01

## 2020-01-01 ENCOUNTER — OFFICE VISIT (OUTPATIENT)
Dept: ORTHOPEDIC SURGERY | Age: 65
End: 2020-01-01

## 2020-01-01 ENCOUNTER — ANESTHESIA EVENT (OUTPATIENT)
Dept: SURGERY | Age: 65
DRG: 710 | End: 2020-01-01
Payer: MEDICAID

## 2020-01-01 ENCOUNTER — VIRTUAL VISIT (OUTPATIENT)
Dept: CARDIOLOGY CLINIC | Age: 65
End: 2020-01-01

## 2020-01-01 ENCOUNTER — APPOINTMENT (OUTPATIENT)
Dept: NON INVASIVE DIAGNOSTICS | Age: 65
DRG: 190 | End: 2020-01-01
Attending: NURSE PRACTITIONER
Payer: MEDICAID

## 2020-01-01 ENCOUNTER — HOME HEALTH ADMISSION (OUTPATIENT)
Dept: HOME HEALTH SERVICES | Facility: HOME HEALTH | Age: 65
End: 2020-01-01
Payer: MEDICAID

## 2020-01-01 ENCOUNTER — TELEPHONE (OUTPATIENT)
Dept: SURGERY | Age: 65
End: 2020-01-01

## 2020-01-01 ENCOUNTER — APPOINTMENT (OUTPATIENT)
Dept: NON INVASIVE DIAGNOSTICS | Age: 65
DRG: 190 | End: 2020-01-01
Attending: HOSPITALIST
Payer: MEDICAID

## 2020-01-01 ENCOUNTER — APPOINTMENT (OUTPATIENT)
Dept: GENERAL RADIOLOGY | Age: 65
DRG: 190 | End: 2020-01-01
Attending: HOSPITALIST
Payer: MEDICAID

## 2020-01-01 ENCOUNTER — APPOINTMENT (OUTPATIENT)
Dept: GENERAL RADIOLOGY | Age: 65
DRG: 720 | End: 2020-01-01
Attending: EMERGENCY MEDICINE
Payer: MEDICAID

## 2020-01-01 ENCOUNTER — HOSPITAL ENCOUNTER (OUTPATIENT)
Dept: INTERVENTIONAL RADIOLOGY/VASCULAR | Age: 65
Discharge: HOME | DRG: 710 | End: 2020-03-11
Attending: INTERNAL MEDICINE
Payer: MEDICAID

## 2020-01-01 ENCOUNTER — HOSPITAL ENCOUNTER (INPATIENT)
Age: 65
LOS: 5 days | Discharge: HOME HEALTH | DRG: 720 | End: 2020-01-22
Attending: EMERGENCY MEDICINE | Admitting: HOSPITALIST
Payer: MEDICAID

## 2020-01-01 ENCOUNTER — HOSPITAL ENCOUNTER (INPATIENT)
Age: 65
LOS: 3 days | Discharge: HOME HEALTH | DRG: 720 | End: 2020-05-19
Attending: EMERGENCY MEDICINE | Admitting: HOSPITALIST
Payer: MEDICAID

## 2020-01-01 ENCOUNTER — APPOINTMENT (OUTPATIENT)
Dept: ULTRASOUND IMAGING | Age: 65
DRG: 720 | End: 2020-01-01
Attending: PHYSICIAN ASSISTANT
Payer: MEDICAID

## 2020-01-01 ENCOUNTER — APPOINTMENT (OUTPATIENT)
Dept: CT IMAGING | Age: 65
DRG: 190 | End: 2020-01-01
Attending: STUDENT IN AN ORGANIZED HEALTH CARE EDUCATION/TRAINING PROGRAM
Payer: MEDICAID

## 2020-01-01 ENCOUNTER — APPOINTMENT (OUTPATIENT)
Dept: GENERAL RADIOLOGY | Age: 65
DRG: 190 | End: 2020-01-01
Attending: EMERGENCY MEDICINE
Payer: MEDICAID

## 2020-01-01 ENCOUNTER — APPOINTMENT (OUTPATIENT)
Dept: GENERAL RADIOLOGY | Age: 65
DRG: 720 | End: 2020-01-01
Attending: STUDENT IN AN ORGANIZED HEALTH CARE EDUCATION/TRAINING PROGRAM
Payer: MEDICAID

## 2020-01-01 ENCOUNTER — APPOINTMENT (OUTPATIENT)
Dept: GENERAL RADIOLOGY | Age: 65
DRG: 710 | End: 2020-01-01
Attending: SURGERY
Payer: MEDICAID

## 2020-01-01 ENCOUNTER — APPOINTMENT (OUTPATIENT)
Dept: CT IMAGING | Age: 65
DRG: 710 | End: 2020-01-01
Attending: PHYSICIAN ASSISTANT
Payer: MEDICAID

## 2020-01-01 ENCOUNTER — APPOINTMENT (OUTPATIENT)
Dept: CT IMAGING | Age: 65
DRG: 720 | End: 2020-01-01
Attending: EMERGENCY MEDICINE
Payer: MEDICAID

## 2020-01-01 VITALS
OXYGEN SATURATION: 96 % | SYSTOLIC BLOOD PRESSURE: 110 MMHG | RESPIRATION RATE: 17 BRPM | HEART RATE: 92 BPM | DIASTOLIC BLOOD PRESSURE: 65 MMHG | TEMPERATURE: 98.4 F

## 2020-01-01 VITALS
HEART RATE: 72 BPM | TEMPERATURE: 98.5 F | SYSTOLIC BLOOD PRESSURE: 102 MMHG | OXYGEN SATURATION: 94 % | RESPIRATION RATE: 17 BRPM | DIASTOLIC BLOOD PRESSURE: 62 MMHG

## 2020-01-01 VITALS
DIASTOLIC BLOOD PRESSURE: 68 MMHG | SYSTOLIC BLOOD PRESSURE: 146 MMHG | TEMPERATURE: 96.9 F | OXYGEN SATURATION: 96 % | RESPIRATION RATE: 18 BRPM | HEART RATE: 92 BPM

## 2020-01-01 VITALS — SYSTOLIC BLOOD PRESSURE: 143 MMHG | DIASTOLIC BLOOD PRESSURE: 81 MMHG | HEART RATE: 95 BPM | OXYGEN SATURATION: 94 %

## 2020-01-01 VITALS
DIASTOLIC BLOOD PRESSURE: 78 MMHG | SYSTOLIC BLOOD PRESSURE: 118 MMHG | OXYGEN SATURATION: 92 % | HEART RATE: 72 BPM | TEMPERATURE: 97 F | RESPIRATION RATE: 18 BRPM

## 2020-01-01 VITALS
RESPIRATION RATE: 19 BRPM | HEART RATE: 86 BPM | TEMPERATURE: 98.6 F | SYSTOLIC BLOOD PRESSURE: 110 MMHG | OXYGEN SATURATION: 96 % | DIASTOLIC BLOOD PRESSURE: 58 MMHG

## 2020-01-01 VITALS
OXYGEN SATURATION: 98 % | DIASTOLIC BLOOD PRESSURE: 76 MMHG | RESPIRATION RATE: 20 BRPM | HEART RATE: 78 BPM | SYSTOLIC BLOOD PRESSURE: 132 MMHG | TEMPERATURE: 97.6 F

## 2020-01-01 VITALS — SYSTOLIC BLOOD PRESSURE: 142 MMHG | OXYGEN SATURATION: 95 % | DIASTOLIC BLOOD PRESSURE: 78 MMHG | HEART RATE: 103 BPM

## 2020-01-01 VITALS — OXYGEN SATURATION: 98 % | SYSTOLIC BLOOD PRESSURE: 135 MMHG | DIASTOLIC BLOOD PRESSURE: 65 MMHG | HEART RATE: 62 BPM

## 2020-01-01 VITALS
OXYGEN SATURATION: 98 % | BODY MASS INDEX: 28.57 KG/M2 | HEART RATE: 98 BPM | TEMPERATURE: 97.3 F | SYSTOLIC BLOOD PRESSURE: 131 MMHG | DIASTOLIC BLOOD PRESSURE: 83 MMHG | RESPIRATION RATE: 20 BRPM | HEIGHT: 65 IN

## 2020-01-01 VITALS
HEART RATE: 101 BPM | OXYGEN SATURATION: 95 % | TEMPERATURE: 98.7 F | DIASTOLIC BLOOD PRESSURE: 57 MMHG | SYSTOLIC BLOOD PRESSURE: 123 MMHG | RESPIRATION RATE: 18 BRPM

## 2020-01-01 VITALS
SYSTOLIC BLOOD PRESSURE: 110 MMHG | RESPIRATION RATE: 18 BRPM | HEART RATE: 77 BPM | DIASTOLIC BLOOD PRESSURE: 60 MMHG | TEMPERATURE: 98.5 F | OXYGEN SATURATION: 93 %

## 2020-01-01 VITALS
BODY MASS INDEX: 30.11 KG/M2 | OXYGEN SATURATION: 100 % | DIASTOLIC BLOOD PRESSURE: 75 MMHG | HEART RATE: 76 BPM | WEIGHT: 163.6 LBS | HEIGHT: 62 IN | TEMPERATURE: 98.5 F | SYSTOLIC BLOOD PRESSURE: 133 MMHG | RESPIRATION RATE: 18 BRPM

## 2020-01-01 VITALS
TEMPERATURE: 97.8 F | DIASTOLIC BLOOD PRESSURE: 80 MMHG | HEART RATE: 64 BPM | OXYGEN SATURATION: 95 % | SYSTOLIC BLOOD PRESSURE: 128 MMHG | RESPIRATION RATE: 16 BRPM

## 2020-01-01 VITALS
SYSTOLIC BLOOD PRESSURE: 126 MMHG | TEMPERATURE: 97.7 F | OXYGEN SATURATION: 96 % | RESPIRATION RATE: 18 BRPM | DIASTOLIC BLOOD PRESSURE: 74 MMHG | HEART RATE: 89 BPM

## 2020-01-01 VITALS
SYSTOLIC BLOOD PRESSURE: 111 MMHG | OXYGEN SATURATION: 96 % | TEMPERATURE: 97.7 F | RESPIRATION RATE: 19 BRPM | HEART RATE: 78 BPM | DIASTOLIC BLOOD PRESSURE: 78 MMHG

## 2020-01-01 VITALS
SYSTOLIC BLOOD PRESSURE: 120 MMHG | RESPIRATION RATE: 16 BRPM | OXYGEN SATURATION: 92 % | HEART RATE: 81 BPM | DIASTOLIC BLOOD PRESSURE: 72 MMHG | TEMPERATURE: 97.7 F

## 2020-01-01 VITALS
RESPIRATION RATE: 16 BRPM | DIASTOLIC BLOOD PRESSURE: 68 MMHG | TEMPERATURE: 97.8 F | SYSTOLIC BLOOD PRESSURE: 120 MMHG | OXYGEN SATURATION: 97 % | HEART RATE: 96 BPM

## 2020-01-01 VITALS
RESPIRATION RATE: 16 BRPM | HEART RATE: 86 BPM | OXYGEN SATURATION: 96 % | SYSTOLIC BLOOD PRESSURE: 126 MMHG | TEMPERATURE: 98.1 F | DIASTOLIC BLOOD PRESSURE: 80 MMHG

## 2020-01-01 VITALS
DIASTOLIC BLOOD PRESSURE: 82 MMHG | OXYGEN SATURATION: 78 % | SYSTOLIC BLOOD PRESSURE: 120 MMHG | HEART RATE: 87 BPM | RESPIRATION RATE: 18 BRPM | TEMPERATURE: 97.5 F

## 2020-01-01 VITALS
HEART RATE: 68 BPM | RESPIRATION RATE: 20 BRPM | SYSTOLIC BLOOD PRESSURE: 144 MMHG | OXYGEN SATURATION: 95 % | DIASTOLIC BLOOD PRESSURE: 64 MMHG | HEIGHT: 64 IN | TEMPERATURE: 97.8 F | BODY MASS INDEX: 28.17 KG/M2 | WEIGHT: 165 LBS

## 2020-01-01 VITALS
HEART RATE: 78 BPM | HEART RATE: 95 BPM | SYSTOLIC BLOOD PRESSURE: 134 MMHG | OXYGEN SATURATION: 97 % | SYSTOLIC BLOOD PRESSURE: 140 MMHG | DIASTOLIC BLOOD PRESSURE: 78 MMHG | DIASTOLIC BLOOD PRESSURE: 62 MMHG | TEMPERATURE: 97 F | OXYGEN SATURATION: 95 % | RESPIRATION RATE: 20 BRPM | TEMPERATURE: 98.8 F

## 2020-01-01 VITALS
SYSTOLIC BLOOD PRESSURE: 132 MMHG | HEART RATE: 87 BPM | OXYGEN SATURATION: 98 % | DIASTOLIC BLOOD PRESSURE: 78 MMHG | TEMPERATURE: 98.7 F

## 2020-01-01 VITALS
TEMPERATURE: 97.3 F | SYSTOLIC BLOOD PRESSURE: 120 MMHG | HEART RATE: 93 BPM | RESPIRATION RATE: 16 BRPM | OXYGEN SATURATION: 93 % | DIASTOLIC BLOOD PRESSURE: 72 MMHG

## 2020-01-01 VITALS
HEART RATE: 73 BPM | TEMPERATURE: 97 F | DIASTOLIC BLOOD PRESSURE: 73 MMHG | HEIGHT: 64 IN | WEIGHT: 163 LBS | OXYGEN SATURATION: 91 % | BODY MASS INDEX: 27.83 KG/M2 | SYSTOLIC BLOOD PRESSURE: 110 MMHG

## 2020-01-01 VITALS
DIASTOLIC BLOOD PRESSURE: 68 MMHG | TEMPERATURE: 97 F | HEART RATE: 100 BPM | RESPIRATION RATE: 20 BRPM | OXYGEN SATURATION: 95 % | SYSTOLIC BLOOD PRESSURE: 138 MMHG

## 2020-01-01 VITALS — HEART RATE: 116 BPM | SYSTOLIC BLOOD PRESSURE: 160 MMHG | DIASTOLIC BLOOD PRESSURE: 90 MMHG

## 2020-01-01 VITALS — DIASTOLIC BLOOD PRESSURE: 70 MMHG | HEART RATE: 75 BPM | SYSTOLIC BLOOD PRESSURE: 125 MMHG | OXYGEN SATURATION: 98 %

## 2020-01-01 VITALS
OXYGEN SATURATION: 91 % | TEMPERATURE: 97.7 F | HEART RATE: 88 BPM | DIASTOLIC BLOOD PRESSURE: 60 MMHG | RESPIRATION RATE: 16 BRPM | SYSTOLIC BLOOD PRESSURE: 110 MMHG

## 2020-01-01 VITALS — HEART RATE: 100 BPM | OXYGEN SATURATION: 95 % | SYSTOLIC BLOOD PRESSURE: 138 MMHG | DIASTOLIC BLOOD PRESSURE: 68 MMHG

## 2020-01-01 VITALS
RESPIRATION RATE: 18 BRPM | OXYGEN SATURATION: 96 % | HEART RATE: 90 BPM | TEMPERATURE: 98.6 F | SYSTOLIC BLOOD PRESSURE: 116 MMHG | DIASTOLIC BLOOD PRESSURE: 60 MMHG

## 2020-01-01 VITALS
RESPIRATION RATE: 18 BRPM | WEIGHT: 172.4 LBS | TEMPERATURE: 97.9 F | DIASTOLIC BLOOD PRESSURE: 83 MMHG | HEIGHT: 65 IN | SYSTOLIC BLOOD PRESSURE: 143 MMHG | BODY MASS INDEX: 28.72 KG/M2 | HEART RATE: 72 BPM | OXYGEN SATURATION: 94 %

## 2020-01-01 VITALS
HEIGHT: 64 IN | TEMPERATURE: 98.6 F | BODY MASS INDEX: 27.98 KG/M2 | HEART RATE: 78 BPM | DIASTOLIC BLOOD PRESSURE: 74 MMHG | SYSTOLIC BLOOD PRESSURE: 128 MMHG | RESPIRATION RATE: 15 BRPM

## 2020-01-01 VITALS
TEMPERATURE: 98.2 F | HEART RATE: 112 BPM | OXYGEN SATURATION: 96 % | SYSTOLIC BLOOD PRESSURE: 118 MMHG | DIASTOLIC BLOOD PRESSURE: 72 MMHG

## 2020-01-01 VITALS
SYSTOLIC BLOOD PRESSURE: 110 MMHG | DIASTOLIC BLOOD PRESSURE: 68 MMHG | OXYGEN SATURATION: 95 % | HEART RATE: 78 BPM | TEMPERATURE: 97.8 F

## 2020-01-01 VITALS
HEART RATE: 95 BPM | OXYGEN SATURATION: 92 % | TEMPERATURE: 98.1 F | RESPIRATION RATE: 18 BRPM | SYSTOLIC BLOOD PRESSURE: 132 MMHG | DIASTOLIC BLOOD PRESSURE: 78 MMHG

## 2020-01-01 VITALS
OXYGEN SATURATION: 100 % | SYSTOLIC BLOOD PRESSURE: 134 MMHG | RESPIRATION RATE: 18 BRPM | WEIGHT: 165 LBS | HEART RATE: 82 BPM | TEMPERATURE: 97.6 F | HEIGHT: 64 IN | BODY MASS INDEX: 28.17 KG/M2 | DIASTOLIC BLOOD PRESSURE: 78 MMHG

## 2020-01-01 VITALS
OXYGEN SATURATION: 98 % | HEART RATE: 86 BPM | RESPIRATION RATE: 28 BRPM | SYSTOLIC BLOOD PRESSURE: 108 MMHG | TEMPERATURE: 98.6 F | DIASTOLIC BLOOD PRESSURE: 58 MMHG

## 2020-01-01 VITALS
SYSTOLIC BLOOD PRESSURE: 117 MMHG | DIASTOLIC BLOOD PRESSURE: 60 MMHG | OXYGEN SATURATION: 95 % | HEART RATE: 90 BPM | TEMPERATURE: 98.5 F

## 2020-01-01 VITALS
HEART RATE: 95 BPM | SYSTOLIC BLOOD PRESSURE: 105 MMHG | TEMPERATURE: 97.9 F | DIASTOLIC BLOOD PRESSURE: 62 MMHG | OXYGEN SATURATION: 96 % | RESPIRATION RATE: 16 BRPM

## 2020-01-01 VITALS
HEART RATE: 99 BPM | SYSTOLIC BLOOD PRESSURE: 152 MMHG | DIASTOLIC BLOOD PRESSURE: 76 MMHG | OXYGEN SATURATION: 93 % | TEMPERATURE: 97.8 F

## 2020-01-01 VITALS
RESPIRATION RATE: 16 BRPM | DIASTOLIC BLOOD PRESSURE: 72 MMHG | OXYGEN SATURATION: 93 % | HEART RATE: 86 BPM | SYSTOLIC BLOOD PRESSURE: 120 MMHG | TEMPERATURE: 98.5 F

## 2020-01-01 VITALS
OXYGEN SATURATION: 97 % | TEMPERATURE: 97 F | HEART RATE: 68 BPM | DIASTOLIC BLOOD PRESSURE: 76 MMHG | SYSTOLIC BLOOD PRESSURE: 138 MMHG | RESPIRATION RATE: 20 BRPM

## 2020-01-01 VITALS — OXYGEN SATURATION: 92 % | SYSTOLIC BLOOD PRESSURE: 138 MMHG | DIASTOLIC BLOOD PRESSURE: 82 MMHG | HEART RATE: 96 BPM

## 2020-01-01 VITALS
RESPIRATION RATE: 16 BRPM | SYSTOLIC BLOOD PRESSURE: 120 MMHG | DIASTOLIC BLOOD PRESSURE: 68 MMHG | OXYGEN SATURATION: 88 % | TEMPERATURE: 97.3 F | HEART RATE: 83 BPM

## 2020-01-01 VITALS
OXYGEN SATURATION: 97 % | RESPIRATION RATE: 16 BRPM | SYSTOLIC BLOOD PRESSURE: 120 MMHG | HEART RATE: 78 BPM | TEMPERATURE: 98.2 F | DIASTOLIC BLOOD PRESSURE: 72 MMHG

## 2020-01-01 VITALS
DIASTOLIC BLOOD PRESSURE: 68 MMHG | HEART RATE: 75 BPM | SYSTOLIC BLOOD PRESSURE: 115 MMHG | OXYGEN SATURATION: 99 % | TEMPERATURE: 98.6 F

## 2020-01-01 VITALS
HEART RATE: 88 BPM | TEMPERATURE: 97.8 F | DIASTOLIC BLOOD PRESSURE: 86 MMHG | HEART RATE: 75 BPM | SYSTOLIC BLOOD PRESSURE: 128 MMHG | RESPIRATION RATE: 18 BRPM | RESPIRATION RATE: 20 BRPM | TEMPERATURE: 98.3 F | SYSTOLIC BLOOD PRESSURE: 120 MMHG | DIASTOLIC BLOOD PRESSURE: 67 MMHG | OXYGEN SATURATION: 92 %

## 2020-01-01 VITALS
OXYGEN SATURATION: 100 % | HEART RATE: 74 BPM | WEIGHT: 171.7 LBS | SYSTOLIC BLOOD PRESSURE: 165 MMHG | DIASTOLIC BLOOD PRESSURE: 88 MMHG | TEMPERATURE: 98.7 F | RESPIRATION RATE: 18 BRPM | BODY MASS INDEX: 28.57 KG/M2

## 2020-01-01 VITALS
DIASTOLIC BLOOD PRESSURE: 72 MMHG | HEART RATE: 66 BPM | BODY MASS INDEX: 30 KG/M2 | SYSTOLIC BLOOD PRESSURE: 118 MMHG | RESPIRATION RATE: 18 BRPM | TEMPERATURE: 98.7 F | OXYGEN SATURATION: 96 % | WEIGHT: 163 LBS | HEIGHT: 62 IN

## 2020-01-01 VITALS
SYSTOLIC BLOOD PRESSURE: 120 MMHG | TEMPERATURE: 98.4 F | OXYGEN SATURATION: 87 % | DIASTOLIC BLOOD PRESSURE: 60 MMHG | HEART RATE: 82 BPM

## 2020-01-01 VITALS
OXYGEN SATURATION: 97 % | DIASTOLIC BLOOD PRESSURE: 96 MMHG | HEART RATE: 96 BPM | TEMPERATURE: 96 F | SYSTOLIC BLOOD PRESSURE: 112 MMHG | RESPIRATION RATE: 20 BRPM

## 2020-01-01 VITALS
OXYGEN SATURATION: 97 % | DIASTOLIC BLOOD PRESSURE: 76 MMHG | RESPIRATION RATE: 18 BRPM | HEART RATE: 80 BPM | TEMPERATURE: 97.8 F | SYSTOLIC BLOOD PRESSURE: 128 MMHG

## 2020-01-01 VITALS
TEMPERATURE: 98.1 F | DIASTOLIC BLOOD PRESSURE: 74 MMHG | OXYGEN SATURATION: 91 % | SYSTOLIC BLOOD PRESSURE: 136 MMHG | HEART RATE: 96 BPM

## 2020-01-01 VITALS
DIASTOLIC BLOOD PRESSURE: 82 MMHG | SYSTOLIC BLOOD PRESSURE: 138 MMHG | HEART RATE: 83 BPM | TEMPERATURE: 97.4 F | OXYGEN SATURATION: 96 %

## 2020-01-01 VITALS
SYSTOLIC BLOOD PRESSURE: 121 MMHG | TEMPERATURE: 98.5 F | OXYGEN SATURATION: 97 % | HEART RATE: 83 BPM | RESPIRATION RATE: 17 BRPM | DIASTOLIC BLOOD PRESSURE: 77 MMHG

## 2020-01-01 VITALS
RESPIRATION RATE: 20 BRPM | TEMPERATURE: 97 F | HEART RATE: 68 BPM | SYSTOLIC BLOOD PRESSURE: 146 MMHG | DIASTOLIC BLOOD PRESSURE: 92 MMHG | OXYGEN SATURATION: 97 %

## 2020-01-01 VITALS
HEART RATE: 76 BPM | DIASTOLIC BLOOD PRESSURE: 70 MMHG | SYSTOLIC BLOOD PRESSURE: 128 MMHG | TEMPERATURE: 97.8 F | OXYGEN SATURATION: 92 % | RESPIRATION RATE: 16 BRPM

## 2020-01-01 VITALS
SYSTOLIC BLOOD PRESSURE: 128 MMHG | HEART RATE: 91 BPM | TEMPERATURE: 98.1 F | OXYGEN SATURATION: 98 % | DIASTOLIC BLOOD PRESSURE: 82 MMHG

## 2020-01-01 VITALS
HEART RATE: 112 BPM | TEMPERATURE: 98.7 F | SYSTOLIC BLOOD PRESSURE: 152 MMHG | OXYGEN SATURATION: 92 % | DIASTOLIC BLOOD PRESSURE: 70 MMHG

## 2020-01-01 VITALS
SYSTOLIC BLOOD PRESSURE: 120 MMHG | TEMPERATURE: 97.4 F | DIASTOLIC BLOOD PRESSURE: 70 MMHG | HEART RATE: 77 BPM | RESPIRATION RATE: 16 BRPM | OXYGEN SATURATION: 98 %

## 2020-01-01 VITALS
OXYGEN SATURATION: 91 % | SYSTOLIC BLOOD PRESSURE: 120 MMHG | HEART RATE: 94 BPM | TEMPERATURE: 98.2 F | DIASTOLIC BLOOD PRESSURE: 60 MMHG | RESPIRATION RATE: 16 BRPM

## 2020-01-01 VITALS — BODY MASS INDEX: 28.17 KG/M2 | HEIGHT: 64 IN | WEIGHT: 165 LBS

## 2020-01-01 VITALS — SYSTOLIC BLOOD PRESSURE: 200 MMHG | OXYGEN SATURATION: 96 % | HEART RATE: 100 BPM | DIASTOLIC BLOOD PRESSURE: 90 MMHG

## 2020-01-01 VITALS
HEART RATE: 72 BPM | OXYGEN SATURATION: 94 % | DIASTOLIC BLOOD PRESSURE: 72 MMHG | SYSTOLIC BLOOD PRESSURE: 118 MMHG | RESPIRATION RATE: 18 BRPM | TEMPERATURE: 97.3 F

## 2020-01-01 VITALS
HEART RATE: 96 BPM | OXYGEN SATURATION: 97 % | TEMPERATURE: 97.3 F | SYSTOLIC BLOOD PRESSURE: 122 MMHG | RESPIRATION RATE: 20 BRPM | DIASTOLIC BLOOD PRESSURE: 74 MMHG

## 2020-01-01 VITALS
HEART RATE: 88 BPM | TEMPERATURE: 97.7 F | RESPIRATION RATE: 19 BRPM | OXYGEN SATURATION: 93 % | SYSTOLIC BLOOD PRESSURE: 122 MMHG | DIASTOLIC BLOOD PRESSURE: 64 MMHG

## 2020-01-01 VITALS
OXYGEN SATURATION: 94 % | SYSTOLIC BLOOD PRESSURE: 108 MMHG | TEMPERATURE: 98.6 F | RESPIRATION RATE: 78 BRPM | HEART RATE: 74 BPM | DIASTOLIC BLOOD PRESSURE: 60 MMHG

## 2020-01-01 DIAGNOSIS — M47.14 THORACIC MYELOPATHY: ICD-10-CM

## 2020-01-01 DIAGNOSIS — D86.9 SARCOIDOSIS: ICD-10-CM

## 2020-01-01 DIAGNOSIS — I48.0 PAF (PAROXYSMAL ATRIAL FIBRILLATION) (HCC): ICD-10-CM

## 2020-01-01 DIAGNOSIS — A41.9 SEPSIS, DUE TO UNSPECIFIED ORGANISM, UNSPECIFIED WHETHER ACUTE ORGAN DYSFUNCTION PRESENT (HCC): Primary | ICD-10-CM

## 2020-01-01 DIAGNOSIS — R09.02 HYPOXEMIA REQUIRING SUPPLEMENTAL OXYGEN: ICD-10-CM

## 2020-01-01 DIAGNOSIS — I10 ESSENTIAL HYPERTENSION: ICD-10-CM

## 2020-01-01 DIAGNOSIS — R06.03 RESPIRATORY DISTRESS: Primary | ICD-10-CM

## 2020-01-01 DIAGNOSIS — R09.02 HYPOXIA: ICD-10-CM

## 2020-01-01 DIAGNOSIS — Z20.822 PERSON UNDER INVESTIGATION FOR COVID-19: ICD-10-CM

## 2020-01-01 DIAGNOSIS — R65.21 SEPTIC SHOCK (HCC): ICD-10-CM

## 2020-01-01 DIAGNOSIS — S22.000A COMPRESSION FRACTURE OF BODY OF THORACIC VERTEBRA (HCC): ICD-10-CM

## 2020-01-01 DIAGNOSIS — R10.84 GENERALIZED ABDOMINAL PAIN: ICD-10-CM

## 2020-01-01 DIAGNOSIS — J44.1 COPD EXACERBATION (HCC): ICD-10-CM

## 2020-01-01 DIAGNOSIS — A41.9 SEPTIC SHOCK (HCC): ICD-10-CM

## 2020-01-01 DIAGNOSIS — J18.9 PNEUMONIA OF RIGHT MIDDLE LOBE DUE TO INFECTIOUS ORGANISM: ICD-10-CM

## 2020-01-01 DIAGNOSIS — I27.20 PULMONARY HTN (HCC): ICD-10-CM

## 2020-01-01 DIAGNOSIS — R91.8 LUNG NODULES: ICD-10-CM

## 2020-01-01 DIAGNOSIS — J43.9 PULMONARY EMPHYSEMA, UNSPECIFIED EMPHYSEMA TYPE (HCC): ICD-10-CM

## 2020-01-01 DIAGNOSIS — Z98.890 S/P LUMBAR LAMINECTOMY: Primary | ICD-10-CM

## 2020-01-01 DIAGNOSIS — J18.9 HCAP (HEALTHCARE-ASSOCIATED PNEUMONIA): ICD-10-CM

## 2020-01-01 DIAGNOSIS — F17.200 TOBACCO DEPENDENCE: ICD-10-CM

## 2020-01-01 DIAGNOSIS — Z43.3 COLOSTOMY CARE (HCC): Primary | ICD-10-CM

## 2020-01-01 DIAGNOSIS — I50.32 DIASTOLIC CHF, CHRONIC (HCC): Primary | ICD-10-CM

## 2020-01-01 DIAGNOSIS — Z99.81 HYPOXEMIA REQUIRING SUPPLEMENTAL OXYGEN: ICD-10-CM

## 2020-01-01 DIAGNOSIS — R94.39 ABNORMAL NUCLEAR STRESS TEST: ICD-10-CM

## 2020-01-01 DIAGNOSIS — E87.6 HYPOKALEMIA: ICD-10-CM

## 2020-01-01 DIAGNOSIS — D62 ACUTE BLOOD LOSS AS CAUSE OF POSTOPERATIVE ANEMIA: ICD-10-CM

## 2020-01-01 DIAGNOSIS — J44.1 COPD EXACERBATION (HCC): Primary | ICD-10-CM

## 2020-01-01 DIAGNOSIS — E83.42 HYPOMAGNESEMIA: ICD-10-CM

## 2020-01-01 DIAGNOSIS — R19.8 PERFORATED VISCUS: ICD-10-CM

## 2020-01-01 DIAGNOSIS — Z98.1 S/P FUSION OF THORACIC SPINE: Primary | ICD-10-CM

## 2020-01-01 DIAGNOSIS — N28.89 RENAL MASS: ICD-10-CM

## 2020-01-01 DIAGNOSIS — Z09 POSTOPERATIVE EXAMINATION: Primary | ICD-10-CM

## 2020-01-01 LAB
1,3 BETA GLUCAN SER-MCNC: <31 PG/ML
ABO + RH BLD: NORMAL
ADENOVIRUS, 186016: NOT DETECTED
ADENOVIRUS, 186016: NOT DETECTED
ALBUMIN SERPL-MCNC: 1.4 G/DL (ref 3.4–5)
ALBUMIN SERPL-MCNC: 1.4 G/DL (ref 3.4–5)
ALBUMIN SERPL-MCNC: 1.5 G/DL (ref 3.4–5)
ALBUMIN SERPL-MCNC: 1.6 G/DL (ref 3.4–5)
ALBUMIN SERPL-MCNC: 1.6 G/DL (ref 3.4–5)
ALBUMIN SERPL-MCNC: 1.7 G/DL (ref 3.4–5)
ALBUMIN SERPL-MCNC: 2 G/DL (ref 3.4–5)
ALBUMIN SERPL-MCNC: 2.2 G/DL (ref 3.4–5)
ALBUMIN SERPL-MCNC: 2.3 G/DL (ref 3.4–5)
ALBUMIN SERPL-MCNC: 2.5 G/DL (ref 3.4–5)
ALBUMIN SERPL-MCNC: 2.5 G/DL (ref 3.4–5)
ALBUMIN SERPL-MCNC: 2.6 G/DL (ref 3.4–5)
ALBUMIN SERPL-MCNC: 3 G/DL (ref 3.4–5)
ALBUMIN SERPL-MCNC: 3.1 G/DL (ref 3.4–5)
ALBUMIN SERPL-MCNC: 3.6 G/DL (ref 3.4–5)
ALBUMIN/GLOB SERPL: 0.3 {RATIO} (ref 0.8–1.7)
ALBUMIN/GLOB SERPL: 0.4 {RATIO} (ref 0.8–1.7)
ALBUMIN/GLOB SERPL: 0.5 {RATIO} (ref 0.8–1.7)
ALBUMIN/GLOB SERPL: 0.7 {RATIO} (ref 0.8–1.7)
ALBUMIN/GLOB SERPL: 0.8 {RATIO} (ref 0.8–1.7)
ALBUMIN/GLOB SERPL: 0.9 {RATIO} (ref 0.8–1.7)
ALBUMIN/GLOB SERPL: 1 {RATIO} (ref 0.8–1.7)
ALP SERPL-CCNC: 103 U/L (ref 45–117)
ALP SERPL-CCNC: 114 U/L (ref 45–117)
ALP SERPL-CCNC: 125 U/L (ref 45–117)
ALP SERPL-CCNC: 160 U/L (ref 45–117)
ALP SERPL-CCNC: 46 U/L (ref 45–117)
ALP SERPL-CCNC: 48 U/L (ref 45–117)
ALP SERPL-CCNC: 50 U/L (ref 45–117)
ALP SERPL-CCNC: 51 U/L (ref 45–117)
ALP SERPL-CCNC: 51 U/L (ref 45–117)
ALP SERPL-CCNC: 52 U/L (ref 45–117)
ALP SERPL-CCNC: 54 U/L (ref 45–117)
ALP SERPL-CCNC: 56 U/L (ref 45–117)
ALP SERPL-CCNC: 58 U/L (ref 45–117)
ALP SERPL-CCNC: 61 U/L (ref 45–117)
ALP SERPL-CCNC: 61 U/L (ref 45–117)
ALP SERPL-CCNC: 63 U/L (ref 45–117)
ALP SERPL-CCNC: 64 U/L (ref 45–117)
ALP SERPL-CCNC: 66 U/L (ref 45–117)
ALP SERPL-CCNC: 66 U/L (ref 45–117)
ALP SERPL-CCNC: 68 U/L (ref 45–117)
ALP SERPL-CCNC: 72 U/L (ref 45–117)
ALP SERPL-CCNC: 72 U/L (ref 45–117)
ALP SERPL-CCNC: 73 U/L (ref 45–117)
ALP SERPL-CCNC: 75 U/L (ref 45–117)
ALP SERPL-CCNC: 81 U/L (ref 45–117)
ALP SERPL-CCNC: 82 U/L (ref 45–117)
ALP SERPL-CCNC: 91 U/L (ref 45–117)
ALT SERPL-CCNC: 11 U/L (ref 13–56)
ALT SERPL-CCNC: 12 U/L (ref 13–56)
ALT SERPL-CCNC: 13 U/L (ref 13–56)
ALT SERPL-CCNC: 13 U/L (ref 13–56)
ALT SERPL-CCNC: 14 U/L (ref 13–56)
ALT SERPL-CCNC: 15 U/L (ref 13–56)
ALT SERPL-CCNC: 16 U/L (ref 13–56)
ALT SERPL-CCNC: 16 U/L (ref 13–56)
ALT SERPL-CCNC: 17 U/L (ref 13–56)
ALT SERPL-CCNC: 18 U/L (ref 13–56)
ALT SERPL-CCNC: 18 U/L (ref 13–56)
ALT SERPL-CCNC: 19 U/L (ref 13–56)
ALT SERPL-CCNC: 20 U/L (ref 13–56)
ALT SERPL-CCNC: 21 U/L (ref 13–56)
ALT SERPL-CCNC: 26 U/L (ref 13–56)
ALT SERPL-CCNC: 29 U/L (ref 13–56)
ALT SERPL-CCNC: 31 U/L (ref 13–56)
ALT SERPL-CCNC: 41 U/L (ref 13–56)
AMPHET UR QL SCN: NEGATIVE
AMPHET UR QL SCN: NEGATIVE
ANION GAP SERPL CALC-SCNC: 0 MMOL/L (ref 3–18)
ANION GAP SERPL CALC-SCNC: 0 MMOL/L (ref 3–18)
ANION GAP SERPL CALC-SCNC: 1 MMOL/L (ref 3–18)
ANION GAP SERPL CALC-SCNC: 1 MMOL/L (ref 3–18)
ANION GAP SERPL CALC-SCNC: 10 MMOL/L (ref 3–18)
ANION GAP SERPL CALC-SCNC: 11 MMOL/L (ref 3–18)
ANION GAP SERPL CALC-SCNC: 13 MMOL/L (ref 3–18)
ANION GAP SERPL CALC-SCNC: 2 MMOL/L (ref 3–18)
ANION GAP SERPL CALC-SCNC: 3 MMOL/L (ref 3–18)
ANION GAP SERPL CALC-SCNC: 4 MMOL/L (ref 3–18)
ANION GAP SERPL CALC-SCNC: 5 MMOL/L (ref 3–18)
ANION GAP SERPL CALC-SCNC: 6 MMOL/L (ref 3–18)
ANION GAP SERPL CALC-SCNC: 7 MMOL/L (ref 3–18)
ANION GAP SERPL CALC-SCNC: 7 MMOL/L (ref 3–18)
ANION GAP SERPL CALC-SCNC: 8 MMOL/L (ref 3–18)
ANION GAP SERPL CALC-SCNC: 9 MMOL/L (ref 3–18)
ANTIGENS PRESENT RBC DONR: NORMAL
ANTIGENS PRESENT RBC DONR: NORMAL
APPEARANCE UR: ABNORMAL
APPEARANCE UR: CLEAR
APPEARANCE UR: CLEAR
APTT PPP: 100.8 SEC (ref 23–36.4)
APTT PPP: 103.8 SEC (ref 23–36.4)
APTT PPP: 31.8 SEC (ref 23–36.4)
APTT PPP: 34 SEC (ref 23–36.4)
APTT PPP: 36.7 SEC (ref 23–36.4)
APTT PPP: 36.8 SEC (ref 23–36.4)
APTT PPP: 39.6 SEC (ref 23–36.4)
APTT PPP: 42.8 SEC (ref 23–36.4)
APTT PPP: 50.4 SEC (ref 23–36.4)
APTT PPP: 50.5 SEC (ref 23–36.4)
APTT PPP: 54.2 SEC (ref 23–36.4)
APTT PPP: 69.8 SEC (ref 23–36.4)
APTT PPP: 80.8 SEC (ref 23–36.4)
APTT PPP: 81.1 SEC (ref 23–36.4)
APTT PPP: 91.2 SEC (ref 23–36.4)
APTT PPP: >180 SEC (ref 23–36.4)
ARTERIAL PATENCY WRIST A: ABNORMAL
ARTERIAL PATENCY WRIST A: YES
AST SERPL-CCNC: 12 U/L (ref 10–38)
AST SERPL-CCNC: 13 U/L (ref 10–38)
AST SERPL-CCNC: 15 U/L (ref 10–38)
AST SERPL-CCNC: 16 U/L (ref 10–38)
AST SERPL-CCNC: 18 U/L (ref 10–38)
AST SERPL-CCNC: 18 U/L (ref 10–38)
AST SERPL-CCNC: 19 U/L (ref 10–38)
AST SERPL-CCNC: 20 U/L (ref 10–38)
AST SERPL-CCNC: 21 U/L (ref 10–38)
AST SERPL-CCNC: 21 U/L (ref 10–38)
AST SERPL-CCNC: 22 U/L (ref 10–38)
AST SERPL-CCNC: 22 U/L (ref 10–38)
AST SERPL-CCNC: 25 U/L (ref 10–38)
AST SERPL-CCNC: 26 U/L (ref 10–38)
AST SERPL-CCNC: 27 U/L (ref 10–38)
AST SERPL-CCNC: 30 U/L (ref 10–38)
AST SERPL-CCNC: 30 U/L (ref 10–38)
AST SERPL-CCNC: 32 U/L (ref 10–38)
AST SERPL-CCNC: 33 U/L (ref 10–38)
AST SERPL-CCNC: 37 U/L (ref 10–38)
AST SERPL-CCNC: 41 U/L (ref 10–38)
AST SERPL-CCNC: 44 U/L (ref 10–38)
AST SERPL-CCNC: 44 U/L (ref 10–38)
AST SERPL-CCNC: 48 U/L (ref 10–38)
AST SERPL-CCNC: 55 U/L (ref 10–38)
AST SERPL-CCNC: 60 U/L (ref 10–38)
AST SERPL-CCNC: 76 U/L (ref 10–38)
ATRIAL RATE: 101 BPM
ATRIAL RATE: 114 BPM
ATRIAL RATE: 128 BPM
ATRIAL RATE: 167 BPM
ATRIAL RATE: 300 BPM
ATRIAL RATE: 330 BPM
ATRIAL RATE: 65 BPM
ATRIAL RATE: 65 BPM
ATRIAL RATE: 66 BPM
ATRIAL RATE: 69 BPM
ATRIAL RATE: 90 BPM
ATRIAL RATE: 96 BPM
ATRIAL RATE: 97 BPM
BACTERIA SPEC CULT: ABNORMAL
BACTERIA SPEC CULT: NORMAL
BACTERIA URNS QL MICRO: ABNORMAL /HPF
BACTERIA URNS QL MICRO: ABNORMAL /HPF
BARBITURATES UR QL SCN: NEGATIVE
BARBITURATES UR QL SCN: NEGATIVE
BASE DEFICIT BLD-SCNC: 3 MMOL/L
BASE DEFICIT BLD-SCNC: 3 MMOL/L
BASE DEFICIT BLD-SCNC: 4 MMOL/L
BASE DEFICIT BLD-SCNC: 5 MMOL/L
BASE DEFICIT BLD-SCNC: 7 MMOL/L
BASE EXCESS BLD CALC-SCNC: 1 MMOL/L
BASE EXCESS BLD CALC-SCNC: 12 MMOL/L
BASE EXCESS BLD CALC-SCNC: 6 MMOL/L
BASE EXCESS BLD CALC-SCNC: 8 MMOL/L
BASOPHILS # BLD: 0 K/UL (ref 0–0.06)
BASOPHILS # BLD: 0 K/UL (ref 0–0.1)
BASOPHILS NFR BLD: 0 % (ref 0–2)
BASOPHILS NFR BLD: 0 % (ref 0–3)
BDY SITE: ABNORMAL
BENZODIAZ UR QL: NEGATIVE
BENZODIAZ UR QL: NEGATIVE
BILIRUB DIRECT SERPL-MCNC: 0.3 MG/DL (ref 0–0.2)
BILIRUB DIRECT SERPL-MCNC: <0.1 MG/DL (ref 0–0.2)
BILIRUB SERPL-MCNC: 0.4 MG/DL (ref 0.2–1)
BILIRUB SERPL-MCNC: 0.5 MG/DL (ref 0.2–1)
BILIRUB SERPL-MCNC: 0.6 MG/DL (ref 0.2–1)
BILIRUB SERPL-MCNC: 0.7 MG/DL (ref 0.2–1)
BILIRUB SERPL-MCNC: 0.8 MG/DL (ref 0.2–1)
BILIRUB SERPL-MCNC: 0.9 MG/DL (ref 0.2–1)
BILIRUB SERPL-MCNC: 1 MG/DL (ref 0.2–1)
BILIRUB SERPL-MCNC: 1.2 MG/DL (ref 0.2–1)
BILIRUB UR QL: NEGATIVE
BLD PROD TYP BPU: NORMAL
BLD PROD TYP BPU: NORMAL
BLOOD BANK CMNT PATIENT-IMP: NORMAL
BLOOD GROUP ANTIBODIES SERPL: NORMAL
BLOOD GROUP ANTIBODIES SERPL: NORMAL
BNP SERPL-MCNC: 361 PG/ML (ref 0–900)
BNP SERPL-MCNC: 4505 PG/ML (ref 0–900)
BNP SERPL-MCNC: 455 PG/ML (ref 0–900)
BNP SERPL-MCNC: 97 PG/ML (ref 0–900)
BODY TEMPERATURE: 100.4
BODY TEMPERATURE: 102.6
BODY TEMPERATURE: 98
BODY TEMPERATURE: 98.6
BODY TEMPERATURE: 99.1
BODY TEMPERATURE: 99.7
BORDETELLA PERTUSSIS: NOT DETECTED
BORDETELLA PERTUSSIS: NOT DETECTED
BPU ID: NORMAL
BPU ID: NORMAL
BUN SERPL-MCNC: 10 MG/DL (ref 7–18)
BUN SERPL-MCNC: 12 MG/DL (ref 7–18)
BUN SERPL-MCNC: 12 MG/DL (ref 7–18)
BUN SERPL-MCNC: 13 MG/DL (ref 7–18)
BUN SERPL-MCNC: 13 MG/DL (ref 7–18)
BUN SERPL-MCNC: 14 MG/DL (ref 7–18)
BUN SERPL-MCNC: 14 MG/DL (ref 7–18)
BUN SERPL-MCNC: 15 MG/DL (ref 7–18)
BUN SERPL-MCNC: 15 MG/DL (ref 7–18)
BUN SERPL-MCNC: 16 MG/DL (ref 7–18)
BUN SERPL-MCNC: 16 MG/DL (ref 7–18)
BUN SERPL-MCNC: 17 MG/DL (ref 7–18)
BUN SERPL-MCNC: 18 MG/DL (ref 7–18)
BUN SERPL-MCNC: 19 MG/DL (ref 7–18)
BUN SERPL-MCNC: 19 MG/DL (ref 7–18)
BUN SERPL-MCNC: 20 MG/DL (ref 7–18)
BUN SERPL-MCNC: 21 MG/DL (ref 7–18)
BUN SERPL-MCNC: 21 MG/DL (ref 7–18)
BUN SERPL-MCNC: 22 MG/DL (ref 7–18)
BUN SERPL-MCNC: 24 MG/DL (ref 7–18)
BUN SERPL-MCNC: 25 MG/DL (ref 7–18)
BUN SERPL-MCNC: 5 MG/DL (ref 7–18)
BUN SERPL-MCNC: 6 MG/DL (ref 7–18)
BUN SERPL-MCNC: 6 MG/DL (ref 7–18)
BUN SERPL-MCNC: 7 MG/DL (ref 7–18)
BUN SERPL-MCNC: 8 MG/DL (ref 7–18)
BUN SERPL-MCNC: 9 MG/DL (ref 7–18)
BUN/CREAT SERPL: 10 (ref 12–20)
BUN/CREAT SERPL: 10 (ref 12–20)
BUN/CREAT SERPL: 11 (ref 12–20)
BUN/CREAT SERPL: 12 (ref 12–20)
BUN/CREAT SERPL: 13 (ref 12–20)
BUN/CREAT SERPL: 14 (ref 12–20)
BUN/CREAT SERPL: 15 (ref 12–20)
BUN/CREAT SERPL: 15 (ref 12–20)
BUN/CREAT SERPL: 16 (ref 12–20)
BUN/CREAT SERPL: 17 (ref 12–20)
BUN/CREAT SERPL: 18 (ref 12–20)
BUN/CREAT SERPL: 18 (ref 12–20)
BUN/CREAT SERPL: 19 (ref 12–20)
BUN/CREAT SERPL: 20 (ref 12–20)
BUN/CREAT SERPL: 20 (ref 12–20)
BUN/CREAT SERPL: 23 (ref 12–20)
BUN/CREAT SERPL: 24 (ref 12–20)
BUN/CREAT SERPL: 25 (ref 12–20)
BUN/CREAT SERPL: 26 (ref 12–20)
BUN/CREAT SERPL: 29 (ref 12–20)
BUN/CREAT SERPL: 32 (ref 12–20)
BUN/CREAT SERPL: 8 (ref 12–20)
CA-I SERPL-SCNC: 0.97 MMOL/L (ref 1.12–1.32)
CA-I SERPL-SCNC: 0.98 MMOL/L (ref 1.12–1.32)
CA-I SERPL-SCNC: 1.01 MMOL/L (ref 1.12–1.32)
CA-I SERPL-SCNC: 1.03 MMOL/L (ref 1.12–1.32)
CA-I SERPL-SCNC: 1.03 MMOL/L (ref 1.12–1.32)
CA-I SERPL-SCNC: 1.04 MMOL/L (ref 1.12–1.32)
CA-I SERPL-SCNC: 1.06 MMOL/L (ref 1.12–1.32)
CA-I SERPL-SCNC: 1.08 MMOL/L (ref 1.12–1.32)
CA-I SERPL-SCNC: 1.13 MMOL/L (ref 1.12–1.32)
CALCIUM SERPL-MCNC: 7 MG/DL (ref 8.5–10.1)
CALCIUM SERPL-MCNC: 7.1 MG/DL (ref 8.5–10.1)
CALCIUM SERPL-MCNC: 7.2 MG/DL (ref 8.5–10.1)
CALCIUM SERPL-MCNC: 7.3 MG/DL (ref 8.5–10.1)
CALCIUM SERPL-MCNC: 7.4 MG/DL (ref 8.5–10.1)
CALCIUM SERPL-MCNC: 7.5 MG/DL (ref 8.5–10.1)
CALCIUM SERPL-MCNC: 7.5 MG/DL (ref 8.5–10.1)
CALCIUM SERPL-MCNC: 7.6 MG/DL (ref 8.5–10.1)
CALCIUM SERPL-MCNC: 7.7 MG/DL (ref 8.5–10.1)
CALCIUM SERPL-MCNC: 7.7 MG/DL (ref 8.5–10.1)
CALCIUM SERPL-MCNC: 7.8 MG/DL (ref 8.5–10.1)
CALCIUM SERPL-MCNC: 7.9 MG/DL (ref 8.5–10.1)
CALCIUM SERPL-MCNC: 8 MG/DL (ref 8.5–10.1)
CALCIUM SERPL-MCNC: 8.1 MG/DL (ref 8.5–10.1)
CALCIUM SERPL-MCNC: 8.1 MG/DL (ref 8.5–10.1)
CALCIUM SERPL-MCNC: 8.2 MG/DL (ref 8.5–10.1)
CALCIUM SERPL-MCNC: 8.3 MG/DL (ref 8.5–10.1)
CALCIUM SERPL-MCNC: 8.3 MG/DL (ref 8.5–10.1)
CALCIUM SERPL-MCNC: 8.6 MG/DL (ref 8.5–10.1)
CALCIUM SERPL-MCNC: 8.7 MG/DL (ref 8.5–10.1)
CALCIUM SERPL-MCNC: 8.8 MG/DL (ref 8.5–10.1)
CALCIUM SERPL-MCNC: 8.8 MG/DL (ref 8.5–10.1)
CALCIUM SERPL-MCNC: 8.9 MG/DL (ref 8.5–10.1)
CALCIUM SERPL-MCNC: 8.9 MG/DL (ref 8.5–10.1)
CALCIUM SERPL-MCNC: 9 MG/DL (ref 8.5–10.1)
CALCIUM SERPL-MCNC: 9.5 MG/DL (ref 8.5–10.1)
CALCIUM SERPL-MCNC: 9.8 MG/DL (ref 8.5–10.1)
CALCULATED P AXIS, ECG09: 35 DEGREES
CALCULATED P AXIS, ECG09: 46 DEGREES
CALCULATED P AXIS, ECG09: 47 DEGREES
CALCULATED P AXIS, ECG09: 55 DEGREES
CALCULATED P AXIS, ECG09: 55 DEGREES
CALCULATED P AXIS, ECG09: 59 DEGREES
CALCULATED P AXIS, ECG09: 60 DEGREES
CALCULATED P AXIS, ECG09: 61 DEGREES
CALCULATED P AXIS, ECG09: 62 DEGREES
CALCULATED P AXIS, ECG09: 68 DEGREES
CALCULATED P AXIS, ECG09: 86 DEGREES
CALCULATED R AXIS, ECG10: -25 DEGREES
CALCULATED R AXIS, ECG10: 10 DEGREES
CALCULATED R AXIS, ECG10: 10 DEGREES
CALCULATED R AXIS, ECG10: 15 DEGREES
CALCULATED R AXIS, ECG10: 17 DEGREES
CALCULATED R AXIS, ECG10: 17 DEGREES
CALCULATED R AXIS, ECG10: 34 DEGREES
CALCULATED R AXIS, ECG10: 36 DEGREES
CALCULATED R AXIS, ECG10: 48 DEGREES
CALCULATED R AXIS, ECG10: 8 DEGREES
CALCULATED R AXIS, ECG10: 8 DEGREES
CALCULATED R AXIS, ECG10: 85 DEGREES
CALCULATED R AXIS, ECG10: 90 DEGREES
CALCULATED T AXIS, ECG11: -128 DEGREES
CALCULATED T AXIS, ECG11: -98 DEGREES
CALCULATED T AXIS, ECG11: 43 DEGREES
CALCULATED T AXIS, ECG11: 45 DEGREES
CALCULATED T AXIS, ECG11: 51 DEGREES
CALCULATED T AXIS, ECG11: 53 DEGREES
CALCULATED T AXIS, ECG11: 59 DEGREES
CALCULATED T AXIS, ECG11: 59 DEGREES
CALCULATED T AXIS, ECG11: 60 DEGREES
CALCULATED T AXIS, ECG11: 63 DEGREES
CALCULATED T AXIS, ECG11: 70 DEGREES
CALCULATED T AXIS, ECG11: 77 DEGREES
CALCULATED T AXIS, ECG11: 91 DEGREES
CANNABINOIDS UR QL SCN: NEGATIVE
CANNABINOIDS UR QL SCN: NEGATIVE
CC UR VC: ABNORMAL
CHLAMYDOPHILA PNEUMONIAE: NOT DETECTED
CHLAMYDOPHILA PNEUMONIAE: NOT DETECTED
CHLORIDE SERPL-SCNC: 100 MMOL/L (ref 100–111)
CHLORIDE SERPL-SCNC: 101 MMOL/L (ref 100–111)
CHLORIDE SERPL-SCNC: 102 MMOL/L (ref 100–111)
CHLORIDE SERPL-SCNC: 102 MMOL/L (ref 100–111)
CHLORIDE SERPL-SCNC: 103 MMOL/L (ref 100–111)
CHLORIDE SERPL-SCNC: 104 MMOL/L (ref 100–111)
CHLORIDE SERPL-SCNC: 105 MMOL/L (ref 100–111)
CHLORIDE SERPL-SCNC: 106 MMOL/L (ref 100–111)
CHLORIDE SERPL-SCNC: 106 MMOL/L (ref 100–111)
CHLORIDE SERPL-SCNC: 108 MMOL/L (ref 100–111)
CHLORIDE SERPL-SCNC: 109 MMOL/L (ref 100–111)
CHLORIDE SERPL-SCNC: 109 MMOL/L (ref 100–111)
CHLORIDE SERPL-SCNC: 110 MMOL/L (ref 100–111)
CHLORIDE SERPL-SCNC: 111 MMOL/L (ref 100–111)
CHLORIDE SERPL-SCNC: 111 MMOL/L (ref 100–111)
CHLORIDE SERPL-SCNC: 112 MMOL/L (ref 100–111)
CHLORIDE SERPL-SCNC: 113 MMOL/L (ref 100–111)
CHLORIDE SERPL-SCNC: 114 MMOL/L (ref 100–111)
CHLORIDE SERPL-SCNC: 93 MMOL/L (ref 100–111)
CHLORIDE SERPL-SCNC: 96 MMOL/L (ref 100–111)
CHLORIDE SERPL-SCNC: 99 MMOL/L (ref 100–111)
CK MB CFR SERPL CALC: 0.8 % (ref 0–4)
CK MB CFR SERPL CALC: 1.4 % (ref 0–4)
CK MB CFR SERPL CALC: 2.6 % (ref 0–4)
CK MB CFR SERPL CALC: 3.1 % (ref 0–4)
CK MB CFR SERPL CALC: 3.4 % (ref 0–4)
CK MB CFR SERPL CALC: 5 % (ref 0–4)
CK MB SERPL-MCNC: 2 NG/ML (ref 5–25)
CK MB SERPL-MCNC: 2.1 NG/ML (ref 5–25)
CK MB SERPL-MCNC: 2.2 NG/ML (ref 5–25)
CK MB SERPL-MCNC: 3.6 NG/ML (ref 5–25)
CK MB SERPL-MCNC: 5.5 NG/ML (ref 5–25)
CK MB SERPL-MCNC: 5.8 NG/ML (ref 5–25)
CK SERPL-CCNC: 141 U/L (ref 26–192)
CK SERPL-CCNC: 215 U/L (ref 26–192)
CK SERPL-CCNC: 284 U/L (ref 26–192)
CK SERPL-CCNC: 425 U/L (ref 26–192)
CK SERPL-CCNC: 58 U/L (ref 26–192)
CK SERPL-CCNC: 68 U/L (ref 26–192)
CK SERPL-CCNC: 72 U/L (ref 26–192)
CO2 SERPL-SCNC: 20 MMOL/L (ref 21–32)
CO2 SERPL-SCNC: 21 MMOL/L (ref 21–32)
CO2 SERPL-SCNC: 21 MMOL/L (ref 21–32)
CO2 SERPL-SCNC: 22 MMOL/L (ref 21–32)
CO2 SERPL-SCNC: 24 MMOL/L (ref 21–32)
CO2 SERPL-SCNC: 25 MMOL/L (ref 21–32)
CO2 SERPL-SCNC: 26 MMOL/L (ref 21–32)
CO2 SERPL-SCNC: 27 MMOL/L (ref 21–32)
CO2 SERPL-SCNC: 27 MMOL/L (ref 21–32)
CO2 SERPL-SCNC: 28 MMOL/L (ref 21–32)
CO2 SERPL-SCNC: 29 MMOL/L (ref 21–32)
CO2 SERPL-SCNC: 30 MMOL/L (ref 21–32)
CO2 SERPL-SCNC: 31 MMOL/L (ref 21–32)
CO2 SERPL-SCNC: 32 MMOL/L (ref 21–32)
CO2 SERPL-SCNC: 33 MMOL/L (ref 21–32)
CO2 SERPL-SCNC: 34 MMOL/L (ref 21–32)
CO2 SERPL-SCNC: 36 MMOL/L (ref 21–32)
CO2 SERPL-SCNC: 36 MMOL/L (ref 21–32)
CO2 SERPL-SCNC: 37 MMOL/L (ref 21–32)
CO2 SERPL-SCNC: 37 MMOL/L (ref 21–32)
CO2 SERPL-SCNC: 39 MMOL/L (ref 21–32)
CO2 SERPL-SCNC: 39 MMOL/L (ref 21–32)
CO2 SERPL-SCNC: 40 MMOL/L (ref 21–32)
CO2 SERPL-SCNC: 43 MMOL/L (ref 21–32)
COCAINE UR QL SCN: NEGATIVE
COCAINE UR QL SCN: NEGATIVE
COLOR UR: ABNORMAL
COLOR UR: YELLOW
COLOR UR: YELLOW
CORONAVIRUS 229E: NOT DETECTED
CORONAVIRUS 229E: NOT DETECTED
CORONAVIRUS HKU1: NOT DETECTED
CORONAVIRUS HKU1: NOT DETECTED
CORONAVIRUS NL63: NOT DETECTED
CORONAVIRUS NL63: NOT DETECTED
CORONAVIRUS OC43: NOT DETECTED
CORONAVIRUS OC43: NOT DETECTED
CREAT SERPL-MCNC: 0.4 MG/DL (ref 0.6–1.3)
CREAT SERPL-MCNC: 0.42 MG/DL (ref 0.6–1.3)
CREAT SERPL-MCNC: 0.46 MG/DL (ref 0.6–1.3)
CREAT SERPL-MCNC: 0.47 MG/DL (ref 0.6–1.3)
CREAT SERPL-MCNC: 0.49 MG/DL (ref 0.6–1.3)
CREAT SERPL-MCNC: 0.49 MG/DL (ref 0.6–1.3)
CREAT SERPL-MCNC: 0.51 MG/DL (ref 0.6–1.3)
CREAT SERPL-MCNC: 0.54 MG/DL (ref 0.6–1.3)
CREAT SERPL-MCNC: 0.55 MG/DL (ref 0.6–1.3)
CREAT SERPL-MCNC: 0.55 MG/DL (ref 0.6–1.3)
CREAT SERPL-MCNC: 0.56 MG/DL (ref 0.6–1.3)
CREAT SERPL-MCNC: 0.58 MG/DL (ref 0.6–1.3)
CREAT SERPL-MCNC: 0.58 MG/DL (ref 0.6–1.3)
CREAT SERPL-MCNC: 0.59 MG/DL (ref 0.6–1.3)
CREAT SERPL-MCNC: 0.61 MG/DL (ref 0.6–1.3)
CREAT SERPL-MCNC: 0.62 MG/DL (ref 0.6–1.3)
CREAT SERPL-MCNC: 0.63 MG/DL (ref 0.6–1.3)
CREAT SERPL-MCNC: 0.66 MG/DL (ref 0.6–1.3)
CREAT SERPL-MCNC: 0.67 MG/DL (ref 0.6–1.3)
CREAT SERPL-MCNC: 0.69 MG/DL (ref 0.6–1.3)
CREAT SERPL-MCNC: 0.72 MG/DL (ref 0.6–1.3)
CREAT SERPL-MCNC: 0.74 MG/DL (ref 0.6–1.3)
CREAT SERPL-MCNC: 0.75 MG/DL (ref 0.6–1.3)
CREAT SERPL-MCNC: 0.77 MG/DL (ref 0.6–1.3)
CREAT SERPL-MCNC: 0.82 MG/DL (ref 0.6–1.3)
CREAT SERPL-MCNC: 0.83 MG/DL (ref 0.6–1.3)
CREAT SERPL-MCNC: 0.84 MG/DL (ref 0.6–1.3)
CREAT SERPL-MCNC: 0.85 MG/DL (ref 0.6–1.3)
CREAT SERPL-MCNC: 0.85 MG/DL (ref 0.6–1.3)
CREAT SERPL-MCNC: 0.86 MG/DL (ref 0.6–1.3)
CREAT SERPL-MCNC: 0.86 MG/DL (ref 0.6–1.3)
CREAT SERPL-MCNC: 0.87 MG/DL (ref 0.6–1.3)
CREAT SERPL-MCNC: 0.91 MG/DL (ref 0.6–1.3)
CREAT SERPL-MCNC: 0.92 MG/DL (ref 0.6–1.3)
CREAT SERPL-MCNC: 0.94 MG/DL (ref 0.6–1.3)
CREAT SERPL-MCNC: 0.96 MG/DL (ref 0.6–1.3)
CREAT SERPL-MCNC: 0.97 MG/DL (ref 0.6–1.3)
CREAT SERPL-MCNC: 0.97 MG/DL (ref 0.6–1.3)
CREAT SERPL-MCNC: 1 MG/DL (ref 0.6–1.3)
CREAT SERPL-MCNC: 1.02 MG/DL (ref 0.6–1.3)
CREAT SERPL-MCNC: 1.04 MG/DL (ref 0.6–1.3)
CREAT SERPL-MCNC: 1.19 MG/DL (ref 0.6–1.3)
CREAT SERPL-MCNC: 1.2 MG/DL (ref 0.6–1.3)
CREAT SERPL-MCNC: 1.36 MG/DL (ref 0.6–1.3)
CREAT SERPL-MCNC: 1.4 MG/DL (ref 0.6–1.3)
CROSSMATCH RESULT,%XM: NORMAL
CROSSMATCH RESULT,%XM: NORMAL
CRP SERPL-MCNC: 1.1 MG/DL (ref 0–0.3)
CRP SERPL-MCNC: 1.2 MG/DL (ref 0–0.3)
CRP SERPL-MCNC: 1.2 MG/DL (ref 0–0.3)
CRP SERPL-MCNC: 1.5 MG/DL (ref 0–0.3)
CRP SERPL-MCNC: 1.9 MG/DL (ref 0–0.3)
CRP SERPL-MCNC: 2.3 MG/DL (ref 0–0.3)
CRP SERPL-MCNC: 2.8 MG/DL (ref 0–0.3)
CRP SERPL-MCNC: 4 MG/DL (ref 0–0.3)
CRP SERPL-MCNC: 6.1 MG/DL (ref 0–0.3)
CRP SERPL-MCNC: 6.8 MG/DL (ref 0–0.3)
D DIMER PPP FEU-MCNC: 0.4 UG/ML(FEU)
D DIMER PPP FEU-MCNC: 0.51 UG/ML(FEU)
D DIMER PPP FEU-MCNC: 0.56 UG/ML(FEU)
D DIMER PPP FEU-MCNC: 0.57 UG/ML(FEU)
D DIMER PPP FEU-MCNC: 0.57 UG/ML(FEU)
D DIMER PPP FEU-MCNC: 0.96 UG/ML(FEU)
DATE LAST DOSE: ABNORMAL
DATE LAST DOSE: NORMAL
DIAGNOSIS, 93000: NORMAL
DIFFERENTIAL METHOD BLD: ABNORMAL
ECHO AO ROOT DIAM: 2.85 CM
ECHO LA AREA 4C: 23.1 CM2
ECHO LA MAJOR AXIS: 3.76 CM
ECHO LA VOL 2C: 94.68 ML (ref 22–52)
ECHO LA VOL 4C: 76.1 ML (ref 22–52)
ECHO LA VOL BP: 96.33 ML (ref 22–52)
ECHO LA VOL/BSA BIPLANE: 54.97 ML/M2 (ref 16–28)
ECHO LA VOLUME INDEX A2C: 54.03 ML/M2 (ref 16–28)
ECHO LA VOLUME INDEX A4C: 43.43 ML/M2 (ref 16–28)
ECHO LV EDV A2C: 97.5 ML
ECHO LV EDV A4C: 82.9 ML
ECHO LV EDV BP: 90.9 ML (ref 56–104)
ECHO LV EDV INDEX A4C: 47.3 ML/M2
ECHO LV EDV INDEX BP: 51.9 ML/M2
ECHO LV EDV NDEX A2C: 55.6 ML/M2
ECHO LV EDV TEICHHOLZ: 0.47 ML
ECHO LV EJECTION FRACTION A2C: 71 %
ECHO LV EJECTION FRACTION A4C: 40 %
ECHO LV EJECTION FRACTION BIPLANE: 54.7 % (ref 55–100)
ECHO LV ESV A2C: 28 ML
ECHO LV ESV A4C: 49.5 ML
ECHO LV ESV BP: 41.2 ML (ref 19–49)
ECHO LV ESV INDEX A2C: 16 ML/M2
ECHO LV ESV INDEX A4C: 28.2 ML/M2
ECHO LV ESV INDEX BP: 23.5 ML/M2
ECHO LV ESV TEICHHOLZ: 0.2 ML
ECHO LV INTERNAL DIMENSION DIASTOLIC: 4.11 CM (ref 3.9–5.3)
ECHO LV INTERNAL DIMENSION DIASTOLIC: 4.25 CM (ref 3.9–5.3)
ECHO LV INTERNAL DIMENSION SYSTOLIC: 2.75 CM
ECHO LV INTERNAL DIMENSION SYSTOLIC: 2.89 CM
ECHO LV IVSD: 1.24 CM (ref 0.6–0.9)
ECHO LV MASS 2D: 207 G (ref 67–162)
ECHO LV MASS INDEX 2D: 118.1 G/M2 (ref 43–95)
ECHO LV POSTERIOR WALL DIASTOLIC: 1.2 CM (ref 0.6–0.9)
ECHO LVOT DIAM: 1.82 CM
ECHO PULMONARY ARTERY SYSTOLIC PRESSURE (PASP): 80 MMHG
ECHO TV REGURGITANT MAX VELOCITY: 335.93 CM/S
ECHO TV REGURGITANT MAX VELOCITY: 404.87 CM/S
ECHO TV REGURGITANT PEAK GRADIENT: 45.1 MMHG
ECHO TV REGURGITANT PEAK GRADIENT: 65.6 MMHG
EOSINOPHIL # BLD: 0 K/UL (ref 0–0.4)
EOSINOPHIL # BLD: 0.1 K/UL (ref 0–0.4)
EOSINOPHIL # BLD: 0.2 K/UL (ref 0–0.4)
EOSINOPHIL NFR BLD: 0 % (ref 0–5)
EOSINOPHIL NFR BLD: 1 % (ref 0–5)
EOSINOPHIL NFR BLD: 3 % (ref 0–5)
EPITH CASTS URNS QL MICRO: ABNORMAL /LPF (ref 0–5)
EPITH CASTS URNS QL MICRO: ABNORMAL /LPF (ref 0–5)
ERYTHROCYTE [DISTWIDTH] IN BLOOD BY AUTOMATED COUNT: 15.7 % (ref 11.6–14.5)
ERYTHROCYTE [DISTWIDTH] IN BLOOD BY AUTOMATED COUNT: 15.8 % (ref 11.6–14.5)
ERYTHROCYTE [DISTWIDTH] IN BLOOD BY AUTOMATED COUNT: 16 % (ref 11.6–14.5)
ERYTHROCYTE [DISTWIDTH] IN BLOOD BY AUTOMATED COUNT: 16 % (ref 11.6–14.5)
ERYTHROCYTE [DISTWIDTH] IN BLOOD BY AUTOMATED COUNT: 16.1 % (ref 11.6–14.5)
ERYTHROCYTE [DISTWIDTH] IN BLOOD BY AUTOMATED COUNT: 16.4 % (ref 11.6–14.5)
ERYTHROCYTE [DISTWIDTH] IN BLOOD BY AUTOMATED COUNT: 16.4 % (ref 11.6–14.5)
ERYTHROCYTE [DISTWIDTH] IN BLOOD BY AUTOMATED COUNT: 16.5 % (ref 11.6–14.5)
ERYTHROCYTE [DISTWIDTH] IN BLOOD BY AUTOMATED COUNT: 16.6 % (ref 11.6–14.5)
ERYTHROCYTE [DISTWIDTH] IN BLOOD BY AUTOMATED COUNT: 16.7 % (ref 11.6–14.5)
ERYTHROCYTE [DISTWIDTH] IN BLOOD BY AUTOMATED COUNT: 16.8 % (ref 11.6–14.5)
ERYTHROCYTE [DISTWIDTH] IN BLOOD BY AUTOMATED COUNT: 16.8 % (ref 11.6–14.5)
ERYTHROCYTE [DISTWIDTH] IN BLOOD BY AUTOMATED COUNT: 17 % (ref 11.6–14.5)
ERYTHROCYTE [DISTWIDTH] IN BLOOD BY AUTOMATED COUNT: 17 % (ref 11.6–14.5)
ERYTHROCYTE [DISTWIDTH] IN BLOOD BY AUTOMATED COUNT: 17.1 % (ref 11.6–14.5)
ERYTHROCYTE [DISTWIDTH] IN BLOOD BY AUTOMATED COUNT: 17.2 % (ref 11.6–14.5)
ERYTHROCYTE [DISTWIDTH] IN BLOOD BY AUTOMATED COUNT: 17.2 % (ref 11.6–14.5)
ERYTHROCYTE [DISTWIDTH] IN BLOOD BY AUTOMATED COUNT: 17.3 % (ref 11.6–14.5)
ERYTHROCYTE [DISTWIDTH] IN BLOOD BY AUTOMATED COUNT: 17.3 % (ref 11.6–14.5)
ERYTHROCYTE [DISTWIDTH] IN BLOOD BY AUTOMATED COUNT: 17.4 % (ref 11.6–14.5)
ERYTHROCYTE [DISTWIDTH] IN BLOOD BY AUTOMATED COUNT: 17.4 % (ref 11.6–14.5)
ERYTHROCYTE [DISTWIDTH] IN BLOOD BY AUTOMATED COUNT: 17.6 % (ref 11.6–14.5)
ERYTHROCYTE [DISTWIDTH] IN BLOOD BY AUTOMATED COUNT: 17.7 % (ref 11.6–14.5)
ERYTHROCYTE [DISTWIDTH] IN BLOOD BY AUTOMATED COUNT: 17.8 % (ref 11.6–14.5)
ERYTHROCYTE [DISTWIDTH] IN BLOOD BY AUTOMATED COUNT: 18.6 % (ref 11.6–14.5)
ERYTHROCYTE [DISTWIDTH] IN BLOOD BY AUTOMATED COUNT: 18.8 % (ref 11.6–14.5)
ERYTHROCYTE [SEDIMENTATION RATE] IN BLOOD: 8 MM/HR (ref 0–30)
EST. AVERAGE GLUCOSE BLD GHB EST-MCNC: 189 MG/DL
ETHANOL SERPL-MCNC: <3 MG/DL (ref 0–3)
FERRITIN SERPL-MCNC: 126 NG/ML (ref 8–388)
FERRITIN SERPL-MCNC: 65 NG/ML (ref 8–388)
FERRITIN SERPL-MCNC: 76 NG/ML (ref 8–388)
FERRITIN SERPL-MCNC: 77 NG/ML (ref 8–388)
FERRITIN SERPL-MCNC: 79 NG/ML (ref 8–388)
FERRITIN SERPL-MCNC: 81 NG/ML (ref 8–388)
FERRITIN SERPL-MCNC: 81 NG/ML (ref 8–388)
FERRITIN SERPL-MCNC: 87 NG/ML (ref 8–388)
FERRITIN SERPL-MCNC: 88 NG/ML (ref 8–388)
FIBRINOGEN PPP-MCNC: 323 MG/DL (ref 210–451)
FIBRINOGEN PPP-MCNC: 361 MG/DL (ref 210–451)
FIBRINOGEN PPP-MCNC: 379 MG/DL (ref 210–451)
FIBRINOGEN PPP-MCNC: 407 MG/DL (ref 210–451)
FIBRINOGEN PPP-MCNC: 411 MG/DL (ref 210–451)
FIBRINOGEN PPP-MCNC: 437 MG/DL (ref 210–451)
FLUAV AG NPH QL IA: NEGATIVE
FLUAV RNA SPEC QL NAA+PROBE: NOT DETECTED
FLUAV RNA SPEC QL NAA+PROBE: NOT DETECTED
FLUBV AG NOSE QL IA: NEGATIVE
FLUBV RNA SPEC QL NAA+PROBE: NOT DETECTED
FLUBV RNA SPEC QL NAA+PROBE: NOT DETECTED
FOLATE SERPL-MCNC: >20 NG/ML (ref 3.1–17.5)
GAS FLOW.O2 O2 DELIVERY SYS: ABNORMAL L/MIN
GAS FLOW.O2 SETTING OXYMISER: 14 BPM
GAS FLOW.O2 SETTING OXYMISER: 16 BPM
GAS FLOW.O2 SETTING OXYMISER: 20 BPM
GAS FLOW.O2 SETTING OXYMISER: 20 BPM
GLOBULIN SER CALC-MCNC: 2.3 G/DL (ref 2–4)
GLOBULIN SER CALC-MCNC: 2.6 G/DL (ref 2–4)
GLOBULIN SER CALC-MCNC: 2.7 G/DL (ref 2–4)
GLOBULIN SER CALC-MCNC: 2.9 G/DL (ref 2–4)
GLOBULIN SER CALC-MCNC: 3.2 G/DL (ref 2–4)
GLOBULIN SER CALC-MCNC: 3.2 G/DL (ref 2–4)
GLOBULIN SER CALC-MCNC: 3.3 G/DL (ref 2–4)
GLOBULIN SER CALC-MCNC: 3.4 G/DL (ref 2–4)
GLOBULIN SER CALC-MCNC: 3.5 G/DL (ref 2–4)
GLOBULIN SER CALC-MCNC: 3.6 G/DL (ref 2–4)
GLOBULIN SER CALC-MCNC: 3.9 G/DL (ref 2–4)
GLOBULIN SER CALC-MCNC: 4 G/DL (ref 2–4)
GLOBULIN SER CALC-MCNC: 4.4 G/DL (ref 2–4)
GLOBULIN SER CALC-MCNC: 4.6 G/DL (ref 2–4)
GLOBULIN SER CALC-MCNC: 4.7 G/DL (ref 2–4)
GLOBULIN SER CALC-MCNC: 5.9 G/DL (ref 2–4)
GLUCOSE BLD STRIP.AUTO-MCNC: 100 MG/DL (ref 70–110)
GLUCOSE BLD STRIP.AUTO-MCNC: 101 MG/DL (ref 70–110)
GLUCOSE BLD STRIP.AUTO-MCNC: 102 MG/DL (ref 70–110)
GLUCOSE BLD STRIP.AUTO-MCNC: 102 MG/DL (ref 70–110)
GLUCOSE BLD STRIP.AUTO-MCNC: 103 MG/DL (ref 70–110)
GLUCOSE BLD STRIP.AUTO-MCNC: 103 MG/DL (ref 70–110)
GLUCOSE BLD STRIP.AUTO-MCNC: 105 MG/DL (ref 70–110)
GLUCOSE BLD STRIP.AUTO-MCNC: 108 MG/DL (ref 70–110)
GLUCOSE BLD STRIP.AUTO-MCNC: 108 MG/DL (ref 70–110)
GLUCOSE BLD STRIP.AUTO-MCNC: 109 MG/DL (ref 70–110)
GLUCOSE BLD STRIP.AUTO-MCNC: 109 MG/DL (ref 70–110)
GLUCOSE BLD STRIP.AUTO-MCNC: 110 MG/DL (ref 70–110)
GLUCOSE BLD STRIP.AUTO-MCNC: 110 MG/DL (ref 70–110)
GLUCOSE BLD STRIP.AUTO-MCNC: 111 MG/DL (ref 70–110)
GLUCOSE BLD STRIP.AUTO-MCNC: 111 MG/DL (ref 70–110)
GLUCOSE BLD STRIP.AUTO-MCNC: 112 MG/DL (ref 70–110)
GLUCOSE BLD STRIP.AUTO-MCNC: 113 MG/DL (ref 70–110)
GLUCOSE BLD STRIP.AUTO-MCNC: 115 MG/DL (ref 70–110)
GLUCOSE BLD STRIP.AUTO-MCNC: 119 MG/DL (ref 70–110)
GLUCOSE BLD STRIP.AUTO-MCNC: 120 MG/DL (ref 70–110)
GLUCOSE BLD STRIP.AUTO-MCNC: 120 MG/DL (ref 70–110)
GLUCOSE BLD STRIP.AUTO-MCNC: 121 MG/DL (ref 70–110)
GLUCOSE BLD STRIP.AUTO-MCNC: 122 MG/DL (ref 70–110)
GLUCOSE BLD STRIP.AUTO-MCNC: 122 MG/DL (ref 70–110)
GLUCOSE BLD STRIP.AUTO-MCNC: 123 MG/DL (ref 70–110)
GLUCOSE BLD STRIP.AUTO-MCNC: 124 MG/DL (ref 70–110)
GLUCOSE BLD STRIP.AUTO-MCNC: 127 MG/DL (ref 70–110)
GLUCOSE BLD STRIP.AUTO-MCNC: 127 MG/DL (ref 70–110)
GLUCOSE BLD STRIP.AUTO-MCNC: 128 MG/DL (ref 70–110)
GLUCOSE BLD STRIP.AUTO-MCNC: 129 MG/DL (ref 70–110)
GLUCOSE BLD STRIP.AUTO-MCNC: 130 MG/DL (ref 70–110)
GLUCOSE BLD STRIP.AUTO-MCNC: 130 MG/DL (ref 70–110)
GLUCOSE BLD STRIP.AUTO-MCNC: 132 MG/DL (ref 70–110)
GLUCOSE BLD STRIP.AUTO-MCNC: 133 MG/DL (ref 70–110)
GLUCOSE BLD STRIP.AUTO-MCNC: 134 MG/DL (ref 70–110)
GLUCOSE BLD STRIP.AUTO-MCNC: 136 MG/DL (ref 70–110)
GLUCOSE BLD STRIP.AUTO-MCNC: 138 MG/DL (ref 70–110)
GLUCOSE BLD STRIP.AUTO-MCNC: 139 MG/DL (ref 70–110)
GLUCOSE BLD STRIP.AUTO-MCNC: 140 MG/DL (ref 70–110)
GLUCOSE BLD STRIP.AUTO-MCNC: 142 MG/DL (ref 70–110)
GLUCOSE BLD STRIP.AUTO-MCNC: 142 MG/DL (ref 70–110)
GLUCOSE BLD STRIP.AUTO-MCNC: 144 MG/DL (ref 70–110)
GLUCOSE BLD STRIP.AUTO-MCNC: 144 MG/DL (ref 70–110)
GLUCOSE BLD STRIP.AUTO-MCNC: 145 MG/DL (ref 70–110)
GLUCOSE BLD STRIP.AUTO-MCNC: 146 MG/DL (ref 70–110)
GLUCOSE BLD STRIP.AUTO-MCNC: 146 MG/DL (ref 70–110)
GLUCOSE BLD STRIP.AUTO-MCNC: 147 MG/DL (ref 70–110)
GLUCOSE BLD STRIP.AUTO-MCNC: 148 MG/DL (ref 70–110)
GLUCOSE BLD STRIP.AUTO-MCNC: 150 MG/DL (ref 70–110)
GLUCOSE BLD STRIP.AUTO-MCNC: 151 MG/DL (ref 70–110)
GLUCOSE BLD STRIP.AUTO-MCNC: 151 MG/DL (ref 70–110)
GLUCOSE BLD STRIP.AUTO-MCNC: 152 MG/DL (ref 70–110)
GLUCOSE BLD STRIP.AUTO-MCNC: 153 MG/DL (ref 70–110)
GLUCOSE BLD STRIP.AUTO-MCNC: 154 MG/DL (ref 70–110)
GLUCOSE BLD STRIP.AUTO-MCNC: 156 MG/DL (ref 70–110)
GLUCOSE BLD STRIP.AUTO-MCNC: 157 MG/DL (ref 70–110)
GLUCOSE BLD STRIP.AUTO-MCNC: 161 MG/DL (ref 70–110)
GLUCOSE BLD STRIP.AUTO-MCNC: 161 MG/DL (ref 70–110)
GLUCOSE BLD STRIP.AUTO-MCNC: 162 MG/DL (ref 70–110)
GLUCOSE BLD STRIP.AUTO-MCNC: 163 MG/DL (ref 70–110)
GLUCOSE BLD STRIP.AUTO-MCNC: 163 MG/DL (ref 70–110)
GLUCOSE BLD STRIP.AUTO-MCNC: 166 MG/DL (ref 70–110)
GLUCOSE BLD STRIP.AUTO-MCNC: 166 MG/DL (ref 70–110)
GLUCOSE BLD STRIP.AUTO-MCNC: 167 MG/DL (ref 70–110)
GLUCOSE BLD STRIP.AUTO-MCNC: 167 MG/DL (ref 70–110)
GLUCOSE BLD STRIP.AUTO-MCNC: 168 MG/DL (ref 70–110)
GLUCOSE BLD STRIP.AUTO-MCNC: 169 MG/DL (ref 70–110)
GLUCOSE BLD STRIP.AUTO-MCNC: 169 MG/DL (ref 70–110)
GLUCOSE BLD STRIP.AUTO-MCNC: 170 MG/DL (ref 70–110)
GLUCOSE BLD STRIP.AUTO-MCNC: 171 MG/DL (ref 70–110)
GLUCOSE BLD STRIP.AUTO-MCNC: 171 MG/DL (ref 70–110)
GLUCOSE BLD STRIP.AUTO-MCNC: 172 MG/DL (ref 70–110)
GLUCOSE BLD STRIP.AUTO-MCNC: 173 MG/DL (ref 70–110)
GLUCOSE BLD STRIP.AUTO-MCNC: 177 MG/DL (ref 70–110)
GLUCOSE BLD STRIP.AUTO-MCNC: 179 MG/DL (ref 70–110)
GLUCOSE BLD STRIP.AUTO-MCNC: 180 MG/DL (ref 70–110)
GLUCOSE BLD STRIP.AUTO-MCNC: 180 MG/DL (ref 70–110)
GLUCOSE BLD STRIP.AUTO-MCNC: 184 MG/DL (ref 70–110)
GLUCOSE BLD STRIP.AUTO-MCNC: 184 MG/DL (ref 70–110)
GLUCOSE BLD STRIP.AUTO-MCNC: 185 MG/DL (ref 70–110)
GLUCOSE BLD STRIP.AUTO-MCNC: 185 MG/DL (ref 70–110)
GLUCOSE BLD STRIP.AUTO-MCNC: 186 MG/DL (ref 70–110)
GLUCOSE BLD STRIP.AUTO-MCNC: 187 MG/DL (ref 70–110)
GLUCOSE BLD STRIP.AUTO-MCNC: 191 MG/DL (ref 70–110)
GLUCOSE BLD STRIP.AUTO-MCNC: 192 MG/DL (ref 70–110)
GLUCOSE BLD STRIP.AUTO-MCNC: 193 MG/DL (ref 70–110)
GLUCOSE BLD STRIP.AUTO-MCNC: 196 MG/DL (ref 70–110)
GLUCOSE BLD STRIP.AUTO-MCNC: 199 MG/DL (ref 70–110)
GLUCOSE BLD STRIP.AUTO-MCNC: 200 MG/DL (ref 70–110)
GLUCOSE BLD STRIP.AUTO-MCNC: 201 MG/DL (ref 70–110)
GLUCOSE BLD STRIP.AUTO-MCNC: 203 MG/DL (ref 70–110)
GLUCOSE BLD STRIP.AUTO-MCNC: 205 MG/DL (ref 70–110)
GLUCOSE BLD STRIP.AUTO-MCNC: 205 MG/DL (ref 70–110)
GLUCOSE BLD STRIP.AUTO-MCNC: 207 MG/DL (ref 70–110)
GLUCOSE BLD STRIP.AUTO-MCNC: 209 MG/DL (ref 70–110)
GLUCOSE BLD STRIP.AUTO-MCNC: 210 MG/DL (ref 70–110)
GLUCOSE BLD STRIP.AUTO-MCNC: 210 MG/DL (ref 70–110)
GLUCOSE BLD STRIP.AUTO-MCNC: 213 MG/DL (ref 70–110)
GLUCOSE BLD STRIP.AUTO-MCNC: 215 MG/DL (ref 70–110)
GLUCOSE BLD STRIP.AUTO-MCNC: 216 MG/DL (ref 70–110)
GLUCOSE BLD STRIP.AUTO-MCNC: 216 MG/DL (ref 70–110)
GLUCOSE BLD STRIP.AUTO-MCNC: 217 MG/DL (ref 70–110)
GLUCOSE BLD STRIP.AUTO-MCNC: 218 MG/DL (ref 70–110)
GLUCOSE BLD STRIP.AUTO-MCNC: 218 MG/DL (ref 70–110)
GLUCOSE BLD STRIP.AUTO-MCNC: 219 MG/DL (ref 70–110)
GLUCOSE BLD STRIP.AUTO-MCNC: 220 MG/DL (ref 70–110)
GLUCOSE BLD STRIP.AUTO-MCNC: 221 MG/DL (ref 70–110)
GLUCOSE BLD STRIP.AUTO-MCNC: 222 MG/DL (ref 70–110)
GLUCOSE BLD STRIP.AUTO-MCNC: 222 MG/DL (ref 70–110)
GLUCOSE BLD STRIP.AUTO-MCNC: 225 MG/DL (ref 70–110)
GLUCOSE BLD STRIP.AUTO-MCNC: 227 MG/DL (ref 70–110)
GLUCOSE BLD STRIP.AUTO-MCNC: 228 MG/DL (ref 70–110)
GLUCOSE BLD STRIP.AUTO-MCNC: 228 MG/DL (ref 70–110)
GLUCOSE BLD STRIP.AUTO-MCNC: 231 MG/DL (ref 70–110)
GLUCOSE BLD STRIP.AUTO-MCNC: 233 MG/DL (ref 70–110)
GLUCOSE BLD STRIP.AUTO-MCNC: 236 MG/DL (ref 70–110)
GLUCOSE BLD STRIP.AUTO-MCNC: 237 MG/DL (ref 70–110)
GLUCOSE BLD STRIP.AUTO-MCNC: 240 MG/DL (ref 70–110)
GLUCOSE BLD STRIP.AUTO-MCNC: 243 MG/DL (ref 70–110)
GLUCOSE BLD STRIP.AUTO-MCNC: 243 MG/DL (ref 70–110)
GLUCOSE BLD STRIP.AUTO-MCNC: 244 MG/DL (ref 70–110)
GLUCOSE BLD STRIP.AUTO-MCNC: 245 MG/DL (ref 70–110)
GLUCOSE BLD STRIP.AUTO-MCNC: 246 MG/DL (ref 70–110)
GLUCOSE BLD STRIP.AUTO-MCNC: 247 MG/DL (ref 70–110)
GLUCOSE BLD STRIP.AUTO-MCNC: 249 MG/DL (ref 70–110)
GLUCOSE BLD STRIP.AUTO-MCNC: 250 MG/DL (ref 70–110)
GLUCOSE BLD STRIP.AUTO-MCNC: 256 MG/DL (ref 70–110)
GLUCOSE BLD STRIP.AUTO-MCNC: 267 MG/DL (ref 70–110)
GLUCOSE BLD STRIP.AUTO-MCNC: 273 MG/DL (ref 70–110)
GLUCOSE BLD STRIP.AUTO-MCNC: 275 MG/DL (ref 70–110)
GLUCOSE BLD STRIP.AUTO-MCNC: 275 MG/DL (ref 70–110)
GLUCOSE BLD STRIP.AUTO-MCNC: 279 MG/DL (ref 70–110)
GLUCOSE BLD STRIP.AUTO-MCNC: 282 MG/DL (ref 70–110)
GLUCOSE BLD STRIP.AUTO-MCNC: 282 MG/DL (ref 70–110)
GLUCOSE BLD STRIP.AUTO-MCNC: 283 MG/DL (ref 70–110)
GLUCOSE BLD STRIP.AUTO-MCNC: 285 MG/DL (ref 70–110)
GLUCOSE BLD STRIP.AUTO-MCNC: 287 MG/DL (ref 70–110)
GLUCOSE BLD STRIP.AUTO-MCNC: 296 MG/DL (ref 70–110)
GLUCOSE BLD STRIP.AUTO-MCNC: 298 MG/DL (ref 70–110)
GLUCOSE BLD STRIP.AUTO-MCNC: 299 MG/DL (ref 70–110)
GLUCOSE BLD STRIP.AUTO-MCNC: 302 MG/DL (ref 70–110)
GLUCOSE BLD STRIP.AUTO-MCNC: 303 MG/DL (ref 70–110)
GLUCOSE BLD STRIP.AUTO-MCNC: 303 MG/DL (ref 70–110)
GLUCOSE BLD STRIP.AUTO-MCNC: 309 MG/DL (ref 70–110)
GLUCOSE BLD STRIP.AUTO-MCNC: 312 MG/DL (ref 70–110)
GLUCOSE BLD STRIP.AUTO-MCNC: 319 MG/DL (ref 70–110)
GLUCOSE BLD STRIP.AUTO-MCNC: 320 MG/DL (ref 70–110)
GLUCOSE BLD STRIP.AUTO-MCNC: 327 MG/DL (ref 70–110)
GLUCOSE BLD STRIP.AUTO-MCNC: 338 MG/DL (ref 70–110)
GLUCOSE BLD STRIP.AUTO-MCNC: 341 MG/DL (ref 70–110)
GLUCOSE BLD STRIP.AUTO-MCNC: 343 MG/DL (ref 70–110)
GLUCOSE BLD STRIP.AUTO-MCNC: 347 MG/DL (ref 70–110)
GLUCOSE BLD STRIP.AUTO-MCNC: 352 MG/DL (ref 70–110)
GLUCOSE BLD STRIP.AUTO-MCNC: 354 MG/DL (ref 70–110)
GLUCOSE BLD STRIP.AUTO-MCNC: 413 MG/DL (ref 70–110)
GLUCOSE BLD STRIP.AUTO-MCNC: 415 MG/DL (ref 70–110)
GLUCOSE BLD STRIP.AUTO-MCNC: 76 MG/DL (ref 70–110)
GLUCOSE BLD STRIP.AUTO-MCNC: 85 MG/DL (ref 70–110)
GLUCOSE BLD STRIP.AUTO-MCNC: 85 MG/DL (ref 70–110)
GLUCOSE BLD STRIP.AUTO-MCNC: 86 MG/DL (ref 70–110)
GLUCOSE BLD STRIP.AUTO-MCNC: 88 MG/DL (ref 70–110)
GLUCOSE BLD STRIP.AUTO-MCNC: 89 MG/DL (ref 70–110)
GLUCOSE BLD STRIP.AUTO-MCNC: 91 MG/DL (ref 70–110)
GLUCOSE BLD STRIP.AUTO-MCNC: 93 MG/DL (ref 70–110)
GLUCOSE BLD STRIP.AUTO-MCNC: 94 MG/DL (ref 70–110)
GLUCOSE BLD STRIP.AUTO-MCNC: 94 MG/DL (ref 70–110)
GLUCOSE BLD STRIP.AUTO-MCNC: 95 MG/DL (ref 70–110)
GLUCOSE BLD STRIP.AUTO-MCNC: 97 MG/DL (ref 70–110)
GLUCOSE BLD STRIP.AUTO-MCNC: 98 MG/DL (ref 70–110)
GLUCOSE BLD STRIP.AUTO-MCNC: 99 MG/DL (ref 70–110)
GLUCOSE SERPL-MCNC: 100 MG/DL (ref 74–99)
GLUCOSE SERPL-MCNC: 101 MG/DL (ref 74–99)
GLUCOSE SERPL-MCNC: 102 MG/DL (ref 74–99)
GLUCOSE SERPL-MCNC: 104 MG/DL (ref 74–99)
GLUCOSE SERPL-MCNC: 106 MG/DL (ref 74–99)
GLUCOSE SERPL-MCNC: 109 MG/DL (ref 74–99)
GLUCOSE SERPL-MCNC: 111 MG/DL (ref 74–99)
GLUCOSE SERPL-MCNC: 112 MG/DL (ref 74–99)
GLUCOSE SERPL-MCNC: 118 MG/DL (ref 74–99)
GLUCOSE SERPL-MCNC: 123 MG/DL (ref 74–99)
GLUCOSE SERPL-MCNC: 125 MG/DL (ref 74–99)
GLUCOSE SERPL-MCNC: 128 MG/DL (ref 74–99)
GLUCOSE SERPL-MCNC: 130 MG/DL (ref 74–99)
GLUCOSE SERPL-MCNC: 130 MG/DL (ref 74–99)
GLUCOSE SERPL-MCNC: 132 MG/DL (ref 74–99)
GLUCOSE SERPL-MCNC: 137 MG/DL (ref 74–99)
GLUCOSE SERPL-MCNC: 138 MG/DL (ref 74–99)
GLUCOSE SERPL-MCNC: 139 MG/DL (ref 74–99)
GLUCOSE SERPL-MCNC: 139 MG/DL (ref 74–99)
GLUCOSE SERPL-MCNC: 141 MG/DL (ref 74–99)
GLUCOSE SERPL-MCNC: 147 MG/DL (ref 74–99)
GLUCOSE SERPL-MCNC: 148 MG/DL (ref 74–99)
GLUCOSE SERPL-MCNC: 151 MG/DL (ref 74–99)
GLUCOSE SERPL-MCNC: 164 MG/DL (ref 74–99)
GLUCOSE SERPL-MCNC: 165 MG/DL (ref 74–99)
GLUCOSE SERPL-MCNC: 168 MG/DL (ref 74–99)
GLUCOSE SERPL-MCNC: 171 MG/DL (ref 74–99)
GLUCOSE SERPL-MCNC: 189 MG/DL (ref 74–99)
GLUCOSE SERPL-MCNC: 194 MG/DL (ref 74–99)
GLUCOSE SERPL-MCNC: 205 MG/DL (ref 74–99)
GLUCOSE SERPL-MCNC: 214 MG/DL (ref 74–99)
GLUCOSE SERPL-MCNC: 225 MG/DL (ref 74–99)
GLUCOSE SERPL-MCNC: 227 MG/DL (ref 74–99)
GLUCOSE SERPL-MCNC: 231 MG/DL (ref 74–99)
GLUCOSE SERPL-MCNC: 234 MG/DL (ref 74–99)
GLUCOSE SERPL-MCNC: 244 MG/DL (ref 74–99)
GLUCOSE SERPL-MCNC: 251 MG/DL (ref 74–99)
GLUCOSE SERPL-MCNC: 279 MG/DL (ref 74–99)
GLUCOSE SERPL-MCNC: 311 MG/DL (ref 74–99)
GLUCOSE SERPL-MCNC: 356 MG/DL (ref 74–99)
GLUCOSE SERPL-MCNC: 72 MG/DL (ref 74–99)
GLUCOSE SERPL-MCNC: 73 MG/DL (ref 74–99)
GLUCOSE SERPL-MCNC: 85 MG/DL (ref 74–99)
GLUCOSE SERPL-MCNC: 91 MG/DL (ref 74–99)
GLUCOSE SERPL-MCNC: 95 MG/DL (ref 74–99)
GLUCOSE SERPL-MCNC: 97 MG/DL (ref 74–99)
GLUCOSE SERPL-MCNC: 98 MG/DL (ref 74–99)
GLUCOSE UR STRIP.AUTO-MCNC: NEGATIVE MG/DL
GRAM STN SPEC: ABNORMAL
GRAM STN SPEC: NORMAL
HAPTOGLOB SERPL-MCNC: 234 MG/DL (ref 30–200)
HBA1C MFR BLD: 8.2 % (ref 4.2–5.6)
HCO3 BLD-SCNC: 20.1 MMOL/L (ref 22–26)
HCO3 BLD-SCNC: 20.9 MMOL/L (ref 22–26)
HCO3 BLD-SCNC: 21.1 MMOL/L (ref 22–26)
HCO3 BLD-SCNC: 22.4 MMOL/L (ref 22–26)
HCO3 BLD-SCNC: 25.8 MMOL/L (ref 22–26)
HCO3 BLD-SCNC: 25.9 MMOL/L (ref 22–26)
HCO3 BLD-SCNC: 30.8 MMOL/L (ref 22–26)
HCO3 BLD-SCNC: 31.1 MMOL/L (ref 22–26)
HCO3 BLD-SCNC: 31.8 MMOL/L (ref 22–26)
HCO3 BLD-SCNC: 32.1 MMOL/L (ref 22–26)
HCO3 BLD-SCNC: 38.8 MMOL/L (ref 22–26)
HCT VFR BLD AUTO: 23.2 % (ref 35–45)
HCT VFR BLD AUTO: 23.4 % (ref 35–45)
HCT VFR BLD AUTO: 25.2 % (ref 35–45)
HCT VFR BLD AUTO: 25.5 % (ref 35–45)
HCT VFR BLD AUTO: 26.3 % (ref 35–45)
HCT VFR BLD AUTO: 26.3 % (ref 35–45)
HCT VFR BLD AUTO: 26.7 % (ref 35–45)
HCT VFR BLD AUTO: 27.1 % (ref 35–45)
HCT VFR BLD AUTO: 27.8 % (ref 35–45)
HCT VFR BLD AUTO: 28.1 % (ref 35–45)
HCT VFR BLD AUTO: 28.6 % (ref 35–45)
HCT VFR BLD AUTO: 28.6 % (ref 35–45)
HCT VFR BLD AUTO: 28.9 % (ref 35–45)
HCT VFR BLD AUTO: 29.2 % (ref 35–45)
HCT VFR BLD AUTO: 29.3 % (ref 35–45)
HCT VFR BLD AUTO: 29.4 % (ref 35–45)
HCT VFR BLD AUTO: 29.7 % (ref 35–45)
HCT VFR BLD AUTO: 30.2 % (ref 35–45)
HCT VFR BLD AUTO: 30.3 % (ref 35–45)
HCT VFR BLD AUTO: 30.5 % (ref 35–45)
HCT VFR BLD AUTO: 31.5 % (ref 35–45)
HCT VFR BLD AUTO: 31.5 % (ref 35–45)
HCT VFR BLD AUTO: 31.6 % (ref 35–45)
HCT VFR BLD AUTO: 31.8 % (ref 35–45)
HCT VFR BLD AUTO: 32 % (ref 35–45)
HCT VFR BLD AUTO: 32.7 % (ref 35–45)
HCT VFR BLD AUTO: 33.1 % (ref 35–45)
HCT VFR BLD AUTO: 33.9 % (ref 35–45)
HCT VFR BLD AUTO: 34.1 % (ref 35–45)
HCT VFR BLD AUTO: 35.5 % (ref 35–45)
HCT VFR BLD AUTO: 35.7 % (ref 35–45)
HCT VFR BLD AUTO: 36 % (ref 35–45)
HCT VFR BLD AUTO: 37.3 % (ref 35–45)
HCT VFR BLD AUTO: 37.8 % (ref 35–45)
HCT VFR BLD AUTO: 38.8 % (ref 35–45)
HCT VFR BLD AUTO: 43.7 % (ref 35–45)
HCT VFR BLD AUTO: 43.8 % (ref 35–45)
HCT VFR BLD AUTO: 50.8 % (ref 35–45)
HDSCOM,HDSCOM: ABNORMAL
HDSCOM,HDSCOM: ABNORMAL
HEMOCCULT STL QL: NEGATIVE
HEPARIN AGGREGATION,XHEAGT: NEGATIVE
HGB BLD-MCNC: 10 G/DL (ref 12–16)
HGB BLD-MCNC: 10 G/DL (ref 12–16)
HGB BLD-MCNC: 10.3 G/DL (ref 12–16)
HGB BLD-MCNC: 10.3 G/DL (ref 12–16)
HGB BLD-MCNC: 10.7 G/DL (ref 12–16)
HGB BLD-MCNC: 10.7 G/DL (ref 12–16)
HGB BLD-MCNC: 10.9 G/DL (ref 12–16)
HGB BLD-MCNC: 10.9 G/DL (ref 12–16)
HGB BLD-MCNC: 11.3 G/DL (ref 12–16)
HGB BLD-MCNC: 11.4 G/DL (ref 12–16)
HGB BLD-MCNC: 11.4 G/DL (ref 12–16)
HGB BLD-MCNC: 12.2 G/DL (ref 12–16)
HGB BLD-MCNC: 12.7 G/DL (ref 12–16)
HGB BLD-MCNC: 13.4 G/DL (ref 12–16)
HGB BLD-MCNC: 14.8 G/DL (ref 12–16)
HGB BLD-MCNC: 16.7 G/DL (ref 12–16)
HGB BLD-MCNC: 7.5 G/DL (ref 12–16)
HGB BLD-MCNC: 7.5 G/DL (ref 12–16)
HGB BLD-MCNC: 8.1 G/DL (ref 12–16)
HGB BLD-MCNC: 8.3 G/DL (ref 12–16)
HGB BLD-MCNC: 8.4 G/DL (ref 12–16)
HGB BLD-MCNC: 8.5 G/DL (ref 12–16)
HGB BLD-MCNC: 8.6 G/DL (ref 12–16)
HGB BLD-MCNC: 8.7 G/DL (ref 12–16)
HGB BLD-MCNC: 8.8 G/DL (ref 12–16)
HGB BLD-MCNC: 8.9 G/DL (ref 12–16)
HGB BLD-MCNC: 9 G/DL (ref 12–16)
HGB BLD-MCNC: 9.1 G/DL (ref 12–16)
HGB BLD-MCNC: 9.1 G/DL (ref 12–16)
HGB BLD-MCNC: 9.2 G/DL (ref 12–16)
HGB BLD-MCNC: 9.4 G/DL (ref 12–16)
HGB BLD-MCNC: 9.5 G/DL (ref 12–16)
HGB BLD-MCNC: 9.6 G/DL (ref 12–16)
HGB BLD-MCNC: 9.7 G/DL (ref 12–16)
HGB UR QL STRIP: ABNORMAL
HGB UR QL STRIP: ABNORMAL
HGB UR QL STRIP: NEGATIVE
HPIV1 RNA SPEC QL NAA+PROBE: NOT DETECTED
HPIV1 RNA SPEC QL NAA+PROBE: NOT DETECTED
HPIV2 RNA SPEC QL NAA+PROBE: NOT DETECTED
HPIV2 RNA SPEC QL NAA+PROBE: NOT DETECTED
HPIV3 RNA SPEC QL NAA+PROBE: NOT DETECTED
HPIV3 RNA SPEC QL NAA+PROBE: NOT DETECTED
HUMAN METAPNEUMOVIRUS: NOT DETECTED
HUMAN METAPNEUMOVIRUS: NOT DETECTED
HUMAN RHINOVIRUS/ENTEROVIRUS: NOT DETECTED
HUMAN RHINOVIRUS/ENTEROVIRUS: NOT DETECTED
INFLUENZA A/H1-2009: NOT DETECTED
INFLUENZA A/H1-2009: NOT DETECTED
INFLUENZA A/H1: NOT DETECTED
INFLUENZA A/H1: NOT DETECTED
INFLUENZA A/H3: NOT DETECTED
INFLUENZA A/H3: NOT DETECTED
INR PPP: 1 (ref 0.8–1.2)
INR PPP: 1.1 (ref 0.8–1.2)
INR PPP: 1.3 (ref 0.8–1.2)
INR PPP: 1.4 (ref 0.8–1.2)
INSPIRATION.DURATION SETTING TIME VENT: 1 SEC
INSPIRATION.DURATION SETTING TIME VENT: 1.15 SEC
IRON SATN MFR SERPL: 4 % (ref 20–50)
IRON SERPL-MCNC: 11 UG/DL (ref 50–175)
KETONES UR QL STRIP.AUTO: ABNORMAL MG/DL
KETONES UR QL STRIP.AUTO: NEGATIVE MG/DL
KETONES UR QL STRIP.AUTO: NEGATIVE MG/DL
L PNEUMO AG UR QL IA: NEGATIVE
L PNEUMO AG UR QL IA: NEGATIVE
LACTATE BLD-SCNC: 1.29 MMOL/L (ref 0.4–2)
LACTATE BLD-SCNC: 1.57 MMOL/L (ref 0.4–2)
LACTATE BLD-SCNC: 1.89 MMOL/L (ref 0.4–2)
LACTATE SERPL-SCNC: 1.5 MMOL/L (ref 0.4–2)
LACTATE SERPL-SCNC: 2.1 MMOL/L (ref 0.4–2)
LACTATE SERPL-SCNC: 2.5 MMOL/L (ref 0.4–2)
LACTATE SERPL-SCNC: 2.8 MMOL/L (ref 0.4–2)
LACTATE SERPL-SCNC: 3.3 MMOL/L (ref 0.4–2)
LACTATE SERPL-SCNC: 4.1 MMOL/L (ref 0.4–2)
LACTATE SERPL-SCNC: 4.2 MMOL/L (ref 0.4–2)
LACTATE SERPL-SCNC: 4.5 MMOL/L (ref 0.4–2)
LACTATE SERPL-SCNC: 4.5 MMOL/L (ref 0.4–2)
LACTATE SERPL-SCNC: 4.6 MMOL/L (ref 0.4–2)
LACTATE SERPL-SCNC: 5.6 MMOL/L (ref 0.4–2)
LACTATE SERPL-SCNC: 6.7 MMOL/L (ref 0.4–2)
LDH SERPL L TO P-CCNC: 214 U/L (ref 81–234)
LDH SERPL L TO P-CCNC: 231 U/L (ref 81–234)
LDH SERPL L TO P-CCNC: 253 U/L (ref 81–234)
LDH SERPL L TO P-CCNC: 260 U/L (ref 81–234)
LDH SERPL L TO P-CCNC: 261 U/L (ref 81–234)
LDH SERPL L TO P-CCNC: 270 U/L (ref 81–234)
LDH SERPL L TO P-CCNC: 277 U/L (ref 81–234)
LDH SERPL L TO P-CCNC: 279 U/L (ref 81–234)
LDH SERPL L TO P-CCNC: 316 U/L (ref 81–234)
LDH SERPL L TO P-CCNC: 343 U/L (ref 81–234)
LEUKOCYTE ESTERASE UR QL STRIP.AUTO: ABNORMAL
LEUKOCYTE ESTERASE UR QL STRIP.AUTO: ABNORMAL
LEUKOCYTE ESTERASE UR QL STRIP.AUTO: NEGATIVE
LVFS 2D: 29.63 %
LVFS 2D: 35.16 %
LVSV (MOD BI): 27.61 ML
LVSV (MOD SINGLE 4C): 18.58 ML
LVSV (MOD SINGLE): 38.66 ML
LVSV (TEICH): 23.74 ML
LYMPHOCYTES # BLD: 0.2 K/UL (ref 0.8–3.5)
LYMPHOCYTES # BLD: 0.4 K/UL (ref 0.8–3.5)
LYMPHOCYTES # BLD: 0.5 K/UL (ref 0.8–3.5)
LYMPHOCYTES # BLD: 0.5 K/UL (ref 0.9–3.6)
LYMPHOCYTES # BLD: 0.6 K/UL (ref 0.8–3.5)
LYMPHOCYTES # BLD: 0.7 K/UL (ref 0.8–3.5)
LYMPHOCYTES # BLD: 0.7 K/UL (ref 0.9–3.6)
LYMPHOCYTES # BLD: 0.8 K/UL (ref 0.8–3.5)
LYMPHOCYTES # BLD: 0.8 K/UL (ref 0.9–3.6)
LYMPHOCYTES # BLD: 1.1 K/UL (ref 0.8–3.5)
LYMPHOCYTES # BLD: 1.2 K/UL (ref 0.8–3.5)
LYMPHOCYTES # BLD: 1.3 K/UL (ref 0.8–3.5)
LYMPHOCYTES # BLD: 1.5 K/UL (ref 0.8–3.5)
LYMPHOCYTES # BLD: 1.5 K/UL (ref 0.8–3.5)
LYMPHOCYTES # BLD: 1.6 K/UL (ref 0.9–3.6)
LYMPHOCYTES # BLD: 1.7 K/UL (ref 0.8–3.5)
LYMPHOCYTES # BLD: 1.8 K/UL (ref 0.8–3.5)
LYMPHOCYTES # BLD: 1.9 K/UL (ref 0.8–3.5)
LYMPHOCYTES # BLD: 2 K/UL (ref 0.8–3.5)
LYMPHOCYTES # BLD: 2 K/UL (ref 0.9–3.6)
LYMPHOCYTES # BLD: 2.1 K/UL (ref 0.9–3.6)
LYMPHOCYTES # BLD: 2.3 K/UL (ref 0.9–3.6)
LYMPHOCYTES # BLD: 2.6 K/UL (ref 0.8–3.5)
LYMPHOCYTES # BLD: 2.8 K/UL (ref 0.8–3.5)
LYMPHOCYTES # BLD: 2.8 K/UL (ref 0.9–3.6)
LYMPHOCYTES # BLD: 3.2 K/UL (ref 0.9–3.6)
LYMPHOCYTES # BLD: 3.8 K/UL (ref 0.9–3.6)
LYMPHOCYTES # BLD: 3.9 K/UL (ref 0.8–3.5)
LYMPHOCYTES # BLD: 4.7 K/UL (ref 0.9–3.6)
LYMPHOCYTES # BLD: 4.8 K/UL (ref 0.9–3.6)
LYMPHOCYTES NFR BLD: 1 % (ref 20–51)
LYMPHOCYTES NFR BLD: 10 % (ref 20–51)
LYMPHOCYTES NFR BLD: 12 % (ref 20–51)
LYMPHOCYTES NFR BLD: 16 % (ref 20–51)
LYMPHOCYTES NFR BLD: 17 % (ref 20–51)
LYMPHOCYTES NFR BLD: 17 % (ref 21–52)
LYMPHOCYTES NFR BLD: 17 % (ref 21–52)
LYMPHOCYTES NFR BLD: 18 % (ref 20–51)
LYMPHOCYTES NFR BLD: 18 % (ref 21–52)
LYMPHOCYTES NFR BLD: 19 % (ref 21–52)
LYMPHOCYTES NFR BLD: 2 % (ref 20–51)
LYMPHOCYTES NFR BLD: 21 % (ref 20–51)
LYMPHOCYTES NFR BLD: 21 % (ref 20–51)
LYMPHOCYTES NFR BLD: 21 % (ref 21–52)
LYMPHOCYTES NFR BLD: 23 % (ref 20–51)
LYMPHOCYTES NFR BLD: 24 % (ref 20–51)
LYMPHOCYTES NFR BLD: 27 % (ref 20–51)
LYMPHOCYTES NFR BLD: 27 % (ref 21–52)
LYMPHOCYTES NFR BLD: 28 % (ref 21–52)
LYMPHOCYTES NFR BLD: 3 % (ref 20–51)
LYMPHOCYTES NFR BLD: 31 % (ref 20–51)
LYMPHOCYTES NFR BLD: 37 % (ref 21–52)
LYMPHOCYTES NFR BLD: 40 % (ref 20–51)
LYMPHOCYTES NFR BLD: 43 % (ref 21–52)
LYMPHOCYTES NFR BLD: 5 % (ref 20–51)
LYMPHOCYTES NFR BLD: 5 % (ref 20–51)
LYMPHOCYTES NFR BLD: 6 % (ref 20–51)
LYMPHOCYTES NFR BLD: 6 % (ref 21–52)
LYMPHOCYTES NFR BLD: 6 % (ref 21–52)
LYMPHOCYTES NFR BLD: 7 % (ref 20–51)
LYMPHOCYTES NFR BLD: 8 % (ref 21–52)
LYMPHOCYTES NFR BLD: 9 % (ref 20–51)
MAGNESIUM SERPL-MCNC: 1.2 MG/DL (ref 1.6–2.6)
MAGNESIUM SERPL-MCNC: 1.2 MG/DL (ref 1.6–2.6)
MAGNESIUM SERPL-MCNC: 1.3 MG/DL (ref 1.6–2.6)
MAGNESIUM SERPL-MCNC: 1.4 MG/DL (ref 1.6–2.6)
MAGNESIUM SERPL-MCNC: 1.5 MG/DL (ref 1.6–2.6)
MAGNESIUM SERPL-MCNC: 1.6 MG/DL (ref 1.6–2.6)
MAGNESIUM SERPL-MCNC: 1.7 MG/DL (ref 1.6–2.6)
MAGNESIUM SERPL-MCNC: 1.7 MG/DL (ref 1.6–2.6)
MAGNESIUM SERPL-MCNC: 1.8 MG/DL (ref 1.6–2.6)
MAGNESIUM SERPL-MCNC: 1.8 MG/DL (ref 1.6–2.6)
MAGNESIUM SERPL-MCNC: 1.9 MG/DL (ref 1.6–2.6)
MAGNESIUM SERPL-MCNC: 2 MG/DL (ref 1.6–2.6)
MAGNESIUM SERPL-MCNC: 2.1 MG/DL (ref 1.6–2.6)
MAGNESIUM SERPL-MCNC: 2.2 MG/DL (ref 1.6–2.6)
MAGNESIUM SERPL-MCNC: 2.2 MG/DL (ref 1.6–2.6)
MAGNESIUM SERPL-MCNC: 2.3 MG/DL (ref 1.6–2.6)
MAGNESIUM SERPL-MCNC: 2.3 MG/DL (ref 1.6–2.6)
MAGNESIUM SERPL-MCNC: 2.5 MG/DL (ref 1.6–2.6)
MCH RBC QN AUTO: 24.6 PG (ref 24–34)
MCH RBC QN AUTO: 24.7 PG (ref 24–34)
MCH RBC QN AUTO: 24.9 PG (ref 24–34)
MCH RBC QN AUTO: 25 PG (ref 24–34)
MCH RBC QN AUTO: 25.1 PG (ref 24–34)
MCH RBC QN AUTO: 25.1 PG (ref 24–34)
MCH RBC QN AUTO: 25.2 PG (ref 24–34)
MCH RBC QN AUTO: 25.3 PG (ref 24–34)
MCH RBC QN AUTO: 25.4 PG (ref 24–34)
MCH RBC QN AUTO: 25.7 PG (ref 24–34)
MCH RBC QN AUTO: 26.2 PG (ref 24–34)
MCH RBC QN AUTO: 26.3 PG (ref 24–34)
MCH RBC QN AUTO: 26.5 PG (ref 24–34)
MCH RBC QN AUTO: 26.6 PG (ref 24–34)
MCH RBC QN AUTO: 26.9 PG (ref 24–34)
MCH RBC QN AUTO: 26.9 PG (ref 24–34)
MCH RBC QN AUTO: 27 PG (ref 24–34)
MCH RBC QN AUTO: 27 PG (ref 24–34)
MCH RBC QN AUTO: 27.1 PG (ref 24–34)
MCH RBC QN AUTO: 27.1 PG (ref 24–34)
MCH RBC QN AUTO: 27.2 PG (ref 24–34)
MCH RBC QN AUTO: 27.3 PG (ref 24–34)
MCH RBC QN AUTO: 27.6 PG (ref 24–34)
MCH RBC QN AUTO: 27.6 PG (ref 24–34)
MCH RBC QN AUTO: 27.8 PG (ref 24–34)
MCH RBC QN AUTO: 28 PG (ref 24–34)
MCH RBC QN AUTO: 28.1 PG (ref 24–34)
MCH RBC QN AUTO: 28.2 PG (ref 24–34)
MCH RBC QN AUTO: 28.3 PG (ref 24–34)
MCH RBC QN AUTO: 28.5 PG (ref 24–34)
MCH RBC QN AUTO: 28.7 PG (ref 24–34)
MCH RBC QN AUTO: 29 PG (ref 24–34)
MCH RBC QN AUTO: 29.8 PG (ref 24–34)
MCHC RBC AUTO-ENTMCNC: 29.8 G/DL (ref 31–37)
MCHC RBC AUTO-ENTMCNC: 30 G/DL (ref 31–37)
MCHC RBC AUTO-ENTMCNC: 30.1 G/DL (ref 31–37)
MCHC RBC AUTO-ENTMCNC: 30.2 G/DL (ref 31–37)
MCHC RBC AUTO-ENTMCNC: 30.6 G/DL (ref 31–37)
MCHC RBC AUTO-ENTMCNC: 30.7 G/DL (ref 31–37)
MCHC RBC AUTO-ENTMCNC: 30.7 G/DL (ref 31–37)
MCHC RBC AUTO-ENTMCNC: 30.8 G/DL (ref 31–37)
MCHC RBC AUTO-ENTMCNC: 30.9 G/DL (ref 31–37)
MCHC RBC AUTO-ENTMCNC: 31 G/DL (ref 31–37)
MCHC RBC AUTO-ENTMCNC: 31 G/DL (ref 31–37)
MCHC RBC AUTO-ENTMCNC: 31.1 G/DL (ref 31–37)
MCHC RBC AUTO-ENTMCNC: 31.4 G/DL (ref 31–37)
MCHC RBC AUTO-ENTMCNC: 31.4 G/DL (ref 31–37)
MCHC RBC AUTO-ENTMCNC: 31.6 G/DL (ref 31–37)
MCHC RBC AUTO-ENTMCNC: 31.7 G/DL (ref 31–37)
MCHC RBC AUTO-ENTMCNC: 31.7 G/DL (ref 31–37)
MCHC RBC AUTO-ENTMCNC: 31.8 G/DL (ref 31–37)
MCHC RBC AUTO-ENTMCNC: 31.9 G/DL (ref 31–37)
MCHC RBC AUTO-ENTMCNC: 31.9 G/DL (ref 31–37)
MCHC RBC AUTO-ENTMCNC: 32 G/DL (ref 31–37)
MCHC RBC AUTO-ENTMCNC: 32.1 G/DL (ref 31–37)
MCHC RBC AUTO-ENTMCNC: 32.1 G/DL (ref 31–37)
MCHC RBC AUTO-ENTMCNC: 32.2 G/DL (ref 31–37)
MCHC RBC AUTO-ENTMCNC: 32.2 G/DL (ref 31–37)
MCHC RBC AUTO-ENTMCNC: 32.3 G/DL (ref 31–37)
MCHC RBC AUTO-ENTMCNC: 32.4 G/DL (ref 31–37)
MCHC RBC AUTO-ENTMCNC: 32.5 G/DL (ref 31–37)
MCHC RBC AUTO-ENTMCNC: 32.7 G/DL (ref 31–37)
MCHC RBC AUTO-ENTMCNC: 32.9 G/DL (ref 31–37)
MCHC RBC AUTO-ENTMCNC: 32.9 G/DL (ref 31–37)
MCHC RBC AUTO-ENTMCNC: 33.2 G/DL (ref 31–37)
MCHC RBC AUTO-ENTMCNC: 33.8 G/DL (ref 31–37)
MCV RBC AUTO: 81.2 FL (ref 74–97)
MCV RBC AUTO: 81.9 FL (ref 74–97)
MCV RBC AUTO: 82.1 FL (ref 74–97)
MCV RBC AUTO: 82.2 FL (ref 74–97)
MCV RBC AUTO: 82.2 FL (ref 74–97)
MCV RBC AUTO: 82.4 FL (ref 74–97)
MCV RBC AUTO: 82.8 FL (ref 74–97)
MCV RBC AUTO: 83 FL (ref 74–97)
MCV RBC AUTO: 83.2 FL (ref 74–97)
MCV RBC AUTO: 83.2 FL (ref 74–97)
MCV RBC AUTO: 83.3 FL (ref 74–97)
MCV RBC AUTO: 83.6 FL (ref 74–97)
MCV RBC AUTO: 83.6 FL (ref 74–97)
MCV RBC AUTO: 83.7 FL (ref 74–97)
MCV RBC AUTO: 83.8 FL (ref 74–97)
MCV RBC AUTO: 83.9 FL (ref 74–97)
MCV RBC AUTO: 83.9 FL (ref 74–97)
MCV RBC AUTO: 84 FL (ref 74–97)
MCV RBC AUTO: 84.1 FL (ref 74–97)
MCV RBC AUTO: 84.2 FL (ref 74–97)
MCV RBC AUTO: 84.4 FL (ref 74–97)
MCV RBC AUTO: 84.8 FL (ref 74–97)
MCV RBC AUTO: 85 FL (ref 74–97)
MCV RBC AUTO: 85.6 FL (ref 74–97)
MCV RBC AUTO: 85.8 FL (ref 74–97)
MCV RBC AUTO: 86.1 FL (ref 74–97)
MCV RBC AUTO: 87.4 FL (ref 74–97)
MCV RBC AUTO: 88.1 FL (ref 74–97)
MCV RBC AUTO: 88.1 FL (ref 74–97)
MCV RBC AUTO: 88.6 FL (ref 74–97)
MCV RBC AUTO: 89.2 FL (ref 74–97)
MCV RBC AUTO: 90 FL (ref 74–97)
MCV RBC AUTO: 90.6 FL (ref 74–97)
METAMYELOCYTES NFR BLD MANUAL: 1 %
METAMYELOCYTES NFR BLD MANUAL: 1 %
METAMYELOCYTES NFR BLD MANUAL: 3 %
METAMYELOCYTES NFR BLD MANUAL: 5 %
METAMYELOCYTES NFR BLD MANUAL: 7 %
METHADONE UR QL: POSITIVE
METHADONE UR QL: POSITIVE
MONOCYTES # BLD: 0 K/UL (ref 0–1)
MONOCYTES # BLD: 0.1 K/UL (ref 0–1)
MONOCYTES # BLD: 0.2 K/UL (ref 0–1)
MONOCYTES # BLD: 0.3 K/UL (ref 0–1)
MONOCYTES # BLD: 0.3 K/UL (ref 0–1)
MONOCYTES # BLD: 0.4 K/UL (ref 0.05–1.2)
MONOCYTES # BLD: 0.4 K/UL (ref 0.05–1.2)
MONOCYTES # BLD: 0.4 K/UL (ref 0–1)
MONOCYTES # BLD: 0.5 K/UL (ref 0.05–1.2)
MONOCYTES # BLD: 0.5 K/UL (ref 0–1)
MONOCYTES # BLD: 0.5 K/UL (ref 0–1)
MONOCYTES # BLD: 0.6 K/UL (ref 0–1)
MONOCYTES # BLD: 0.7 K/UL (ref 0.05–1.2)
MONOCYTES # BLD: 0.7 K/UL (ref 0–1)
MONOCYTES # BLD: 0.8 K/UL (ref 0.05–1.2)
MONOCYTES # BLD: 0.8 K/UL (ref 0–1)
MONOCYTES # BLD: 0.9 K/UL (ref 0–1)
MONOCYTES # BLD: 0.9 K/UL (ref 0–1)
MONOCYTES # BLD: 1 K/UL (ref 0.05–1.2)
MONOCYTES # BLD: 1 K/UL (ref 0.05–1.2)
MONOCYTES # BLD: 1 K/UL (ref 0–1)
MONOCYTES # BLD: 1.1 K/UL (ref 0.05–1.2)
MONOCYTES # BLD: 1.1 K/UL (ref 0–1)
MONOCYTES # BLD: 2.1 K/UL (ref 0–1)
MONOCYTES NFR BLD: 0 % (ref 2–9)
MONOCYTES NFR BLD: 1 % (ref 2–9)
MONOCYTES NFR BLD: 10 % (ref 2–9)
MONOCYTES NFR BLD: 12 % (ref 2–9)
MONOCYTES NFR BLD: 12 % (ref 2–9)
MONOCYTES NFR BLD: 3 % (ref 2–9)
MONOCYTES NFR BLD: 3 % (ref 3–10)
MONOCYTES NFR BLD: 4 % (ref 2–9)
MONOCYTES NFR BLD: 4 % (ref 3–10)
MONOCYTES NFR BLD: 5 % (ref 2–9)
MONOCYTES NFR BLD: 6 % (ref 2–9)
MONOCYTES NFR BLD: 6 % (ref 3–10)
MONOCYTES NFR BLD: 7 % (ref 2–9)
MONOCYTES NFR BLD: 7 % (ref 3–10)
MONOCYTES NFR BLD: 7 % (ref 3–10)
MONOCYTES NFR BLD: 8 % (ref 3–10)
MONOCYTES NFR BLD: 9 % (ref 2–9)
MONOCYTES NFR BLD: 9 % (ref 3–10)
MUCOUS THREADS URNS QL MICRO: ABNORMAL /LPF
MYCOPLASMA PNEUMONIAE: NOT DETECTED
MYCOPLASMA PNEUMONIAE: NOT DETECTED
MYELOCYTES NFR BLD MANUAL: 1 %
NEUTS BAND NFR BLD MANUAL: 1 % (ref 0–5)
NEUTS BAND NFR BLD MANUAL: 10 % (ref 0–5)
NEUTS BAND NFR BLD MANUAL: 11 % (ref 0–5)
NEUTS BAND NFR BLD MANUAL: 11 % (ref 0–5)
NEUTS BAND NFR BLD MANUAL: 12 % (ref 0–5)
NEUTS BAND NFR BLD MANUAL: 22 % (ref 0–5)
NEUTS BAND NFR BLD MANUAL: 26 % (ref 0–5)
NEUTS BAND NFR BLD MANUAL: 3 % (ref 0–5)
NEUTS BAND NFR BLD MANUAL: 39 % (ref 0–5)
NEUTS BAND NFR BLD MANUAL: 5 % (ref 0–5)
NEUTS BAND NFR BLD MANUAL: 7 % (ref 0–5)
NEUTS BAND NFR BLD MANUAL: 7 % (ref 0–5)
NEUTS BAND NFR BLD MANUAL: 8 % (ref 0–5)
NEUTS BAND NFR BLD MANUAL: 8 % (ref 0–5)
NEUTS BAND NFR BLD MANUAL: 81 % (ref 0–5)
NEUTS SEG # BLD: 10 K/UL (ref 1.8–8)
NEUTS SEG # BLD: 11.3 K/UL (ref 1.8–8)
NEUTS SEG # BLD: 11.8 K/UL (ref 1.8–8)
NEUTS SEG # BLD: 12.2 K/UL (ref 1.8–8)
NEUTS SEG # BLD: 14.1 K/UL (ref 1.8–8)
NEUTS SEG # BLD: 17.1 K/UL (ref 1.8–8)
NEUTS SEG # BLD: 17.7 K/UL (ref 1.8–8)
NEUTS SEG # BLD: 22 K/UL (ref 1.8–8)
NEUTS SEG # BLD: 25 K/UL (ref 1.8–8)
NEUTS SEG # BLD: 3.7 K/UL (ref 1.8–8)
NEUTS SEG # BLD: 4.1 K/UL (ref 1.8–8)
NEUTS SEG # BLD: 4.1 K/UL (ref 1.8–8)
NEUTS SEG # BLD: 5 K/UL (ref 1.8–8)
NEUTS SEG # BLD: 5.3 K/UL (ref 1.8–8)
NEUTS SEG # BLD: 5.3 K/UL (ref 1.8–8)
NEUTS SEG # BLD: 5.6 K/UL (ref 1.8–8)
NEUTS SEG # BLD: 5.7 K/UL (ref 1.8–8)
NEUTS SEG # BLD: 6.6 K/UL (ref 1.8–8)
NEUTS SEG # BLD: 6.7 K/UL (ref 1.8–8)
NEUTS SEG # BLD: 7.1 K/UL (ref 1.8–8)
NEUTS SEG # BLD: 7.3 K/UL (ref 1.8–8)
NEUTS SEG # BLD: 7.4 K/UL (ref 1.8–8)
NEUTS SEG # BLD: 7.5 K/UL (ref 1.8–8)
NEUTS SEG # BLD: 8 K/UL (ref 1.8–8)
NEUTS SEG # BLD: 8 K/UL (ref 1.8–8)
NEUTS SEG # BLD: 8.1 K/UL (ref 1.8–8)
NEUTS SEG # BLD: 8.2 K/UL (ref 1.8–8)
NEUTS SEG # BLD: 8.4 K/UL (ref 1.8–8)
NEUTS SEG # BLD: 8.5 K/UL (ref 1.8–8)
NEUTS SEG # BLD: 8.5 K/UL (ref 1.8–8)
NEUTS SEG # BLD: 8.8 K/UL (ref 1.8–8)
NEUTS SEG # BLD: 8.9 K/UL (ref 1.8–8)
NEUTS SEG # BLD: 9 K/UL (ref 1.8–8)
NEUTS SEG # BLD: 9.5 K/UL (ref 1.8–8)
NEUTS SEG NFR BLD: 17 % (ref 42–75)
NEUTS SEG NFR BLD: 39 % (ref 42–75)
NEUTS SEG NFR BLD: 50 % (ref 40–73)
NEUTS SEG NFR BLD: 50 % (ref 42–75)
NEUTS SEG NFR BLD: 51 % (ref 42–75)
NEUTS SEG NFR BLD: 53 % (ref 42–75)
NEUTS SEG NFR BLD: 54 % (ref 42–75)
NEUTS SEG NFR BLD: 55 % (ref 40–73)
NEUTS SEG NFR BLD: 55 % (ref 42–75)
NEUTS SEG NFR BLD: 65 % (ref 40–73)
NEUTS SEG NFR BLD: 66 % (ref 42–75)
NEUTS SEG NFR BLD: 66 % (ref 42–75)
NEUTS SEG NFR BLD: 67 % (ref 40–73)
NEUTS SEG NFR BLD: 70 % (ref 42–75)
NEUTS SEG NFR BLD: 73 % (ref 40–73)
NEUTS SEG NFR BLD: 73 % (ref 42–75)
NEUTS SEG NFR BLD: 74 % (ref 40–73)
NEUTS SEG NFR BLD: 74 % (ref 40–73)
NEUTS SEG NFR BLD: 74 % (ref 42–75)
NEUTS SEG NFR BLD: 76 % (ref 40–73)
NEUTS SEG NFR BLD: 76 % (ref 40–73)
NEUTS SEG NFR BLD: 76 % (ref 42–75)
NEUTS SEG NFR BLD: 79 % (ref 42–75)
NEUTS SEG NFR BLD: 79 % (ref 42–75)
NEUTS SEG NFR BLD: 80 % (ref 42–75)
NEUTS SEG NFR BLD: 83 % (ref 42–75)
NEUTS SEG NFR BLD: 85 % (ref 40–73)
NEUTS SEG NFR BLD: 89 % (ref 42–75)
NEUTS SEG NFR BLD: 90 % (ref 40–73)
NEUTS SEG NFR BLD: 91 % (ref 40–73)
NITRITE UR QL STRIP.AUTO: NEGATIVE
NRBC BLD-RTO: 1 PER 100 WBC
NRBC BLD-RTO: 2 PER 100 WBC
O2/TOTAL GAS SETTING VFR VENT: 0.8 %
O2/TOTAL GAS SETTING VFR VENT: 100 %
O2/TOTAL GAS SETTING VFR VENT: 40 %
O2/TOTAL GAS SETTING VFR VENT: 45 %
O2/TOTAL GAS SETTING VFR VENT: 45 %
O2/TOTAL GAS SETTING VFR VENT: 55 %
O2/TOTAL GAS SETTING VFR VENT: 60 %
OPIATES UR QL: NEGATIVE
OPIATES UR QL: POSITIVE
OTHER CELLS NFR BLD MANUAL: 1 %
P-R INTERVAL, ECG05: 112 MS
P-R INTERVAL, ECG05: 112 MS
P-R INTERVAL, ECG05: 114 MS
P-R INTERVAL, ECG05: 116 MS
P-R INTERVAL, ECG05: 116 MS
P-R INTERVAL, ECG05: 118 MS
P-R INTERVAL, ECG05: 124 MS
P-R INTERVAL, ECG05: 126 MS
P-R INTERVAL, ECG05: 126 MS
P-R INTERVAL, ECG05: 128 MS
P-R INTERVAL, ECG05: 92 MS
PARAINFLUENZA 4: NOT DETECTED
PARAINFLUENZA 4: NOT DETECTED
PCO2 BLD: 33.9 MMHG (ref 35–45)
PCO2 BLD: 36.7 MMHG (ref 35–45)
PCO2 BLD: 42.6 MMHG (ref 35–45)
PCO2 BLD: 44.6 MMHG (ref 35–45)
PCO2 BLD: 44.6 MMHG (ref 35–45)
PCO2 BLD: 45.1 MMHG (ref 35–45)
PCO2 BLD: 45.4 MMHG (ref 35–45)
PCO2 BLD: 46.8 MMHG (ref 35–45)
PCO2 BLD: 47.1 MMHG (ref 35–45)
PCO2 BLD: 56 MMHG (ref 35–45)
PCO2 BLD: 71.2 MMHG (ref 35–45)
PCP UR QL: NEGATIVE
PCP UR QL: NEGATIVE
PEEP RESPIRATORY: 5 CMH2O
PEEP RESPIRATORY: 7 CMH2O
PEEP RESPIRATORY: 8 CMH2O
PERIPHERAL SMEAR,PSM: NORMAL
PH BLD: 7.17 [PH] (ref 7.35–7.45)
PH BLD: 7.24 [PH] (ref 7.35–7.45)
PH BLD: 7.29 [PH] (ref 7.35–7.45)
PH BLD: 7.33 [PH] (ref 7.35–7.45)
PH BLD: 7.37 [PH] (ref 7.35–7.45)
PH BLD: 7.41 [PH] (ref 7.35–7.45)
PH BLD: 7.43 [PH] (ref 7.35–7.45)
PH BLD: 7.45 [PH] (ref 7.35–7.45)
PH BLD: 7.46 [PH] (ref 7.35–7.45)
PH BLD: 7.46 [PH] (ref 7.35–7.45)
PH BLD: 7.54 [PH] (ref 7.35–7.45)
PH UR STRIP: 5 [PH] (ref 5–8)
PH UR STRIP: 5.5 [PH] (ref 5–8)
PH UR STRIP: 6.5 [PH] (ref 5–8)
PHOSPHATE SERPL-MCNC: 1.9 MG/DL (ref 2.5–4.9)
PHOSPHATE SERPL-MCNC: 2 MG/DL (ref 2.5–4.9)
PHOSPHATE SERPL-MCNC: 2.1 MG/DL (ref 2.5–4.9)
PHOSPHATE SERPL-MCNC: 2.4 MG/DL (ref 2.5–4.9)
PHOSPHATE SERPL-MCNC: 2.4 MG/DL (ref 2.5–4.9)
PHOSPHATE SERPL-MCNC: 2.5 MG/DL (ref 2.5–4.9)
PHOSPHATE SERPL-MCNC: 2.6 MG/DL (ref 2.5–4.9)
PHOSPHATE SERPL-MCNC: 2.7 MG/DL (ref 2.5–4.9)
PHOSPHATE SERPL-MCNC: 2.7 MG/DL (ref 2.5–4.9)
PHOSPHATE SERPL-MCNC: 2.8 MG/DL (ref 2.5–4.9)
PHOSPHATE SERPL-MCNC: 2.9 MG/DL (ref 2.5–4.9)
PHOSPHATE SERPL-MCNC: 3 MG/DL (ref 2.5–4.9)
PHOSPHATE SERPL-MCNC: 3.1 MG/DL (ref 2.5–4.9)
PHOSPHATE SERPL-MCNC: 3.1 MG/DL (ref 2.5–4.9)
PHOSPHATE SERPL-MCNC: 3.2 MG/DL (ref 2.5–4.9)
PHOSPHATE SERPL-MCNC: 3.3 MG/DL (ref 2.5–4.9)
PHOSPHATE SERPL-MCNC: 3.3 MG/DL (ref 2.5–4.9)
PHOSPHATE SERPL-MCNC: 3.4 MG/DL (ref 2.5–4.9)
PHOSPHATE SERPL-MCNC: 3.4 MG/DL (ref 2.5–4.9)
PHOSPHATE SERPL-MCNC: 3.5 MG/DL (ref 2.5–4.9)
PHOSPHATE SERPL-MCNC: 3.6 MG/DL (ref 2.5–4.9)
PHOSPHATE SERPL-MCNC: 3.7 MG/DL (ref 2.5–4.9)
PHOSPHATE SERPL-MCNC: 4 MG/DL (ref 2.5–4.9)
PHOSPHATE SERPL-MCNC: 4.3 MG/DL (ref 2.5–4.9)
PHOSPHATE SERPL-MCNC: 4.6 MG/DL (ref 2.5–4.9)
PHOSPHATE SERPL-MCNC: 4.7 MG/DL (ref 2.5–4.9)
PHOSPHATE SERPL-MCNC: 5 MG/DL (ref 2.5–4.9)
PHOSPHATE SERPL-MCNC: 5.6 MG/DL (ref 2.5–4.9)
PIP ISTAT,IPIP: 16
PIP ISTAT,IPIP: 17
PIP ISTAT,IPIP: 20
PIP ISTAT,IPIP: 21
PLATELET # BLD AUTO: 102 K/UL (ref 135–420)
PLATELET # BLD AUTO: 103 K/UL (ref 135–420)
PLATELET # BLD AUTO: 108 K/UL (ref 135–420)
PLATELET # BLD AUTO: 120 K/UL (ref 135–420)
PLATELET # BLD AUTO: 133 K/UL (ref 135–420)
PLATELET # BLD AUTO: 144 K/UL (ref 135–420)
PLATELET # BLD AUTO: 146 K/UL (ref 135–420)
PLATELET # BLD AUTO: 149 K/UL (ref 135–420)
PLATELET # BLD AUTO: 150 K/UL (ref 135–420)
PLATELET # BLD AUTO: 151 K/UL (ref 135–420)
PLATELET # BLD AUTO: 153 K/UL (ref 135–420)
PLATELET # BLD AUTO: 154 K/UL (ref 135–420)
PLATELET # BLD AUTO: 156 K/UL (ref 135–420)
PLATELET # BLD AUTO: 159 K/UL (ref 135–420)
PLATELET # BLD AUTO: 163 K/UL (ref 135–420)
PLATELET # BLD AUTO: 167 K/UL (ref 135–420)
PLATELET # BLD AUTO: 168 K/UL (ref 135–420)
PLATELET # BLD AUTO: 176 K/UL (ref 135–420)
PLATELET # BLD AUTO: 182 K/UL (ref 135–420)
PLATELET # BLD AUTO: 188 K/UL (ref 135–420)
PLATELET # BLD AUTO: 218 K/UL (ref 135–420)
PLATELET # BLD AUTO: 252 K/UL (ref 135–420)
PLATELET # BLD AUTO: 279 K/UL (ref 135–420)
PLATELET # BLD AUTO: 316 K/UL (ref 135–420)
PLATELET # BLD AUTO: 345 K/UL (ref 135–420)
PLATELET # BLD AUTO: 358 K/UL (ref 135–420)
PLATELET # BLD AUTO: 403 K/UL (ref 135–420)
PLATELET # BLD AUTO: 407 K/UL (ref 135–420)
PLATELET # BLD AUTO: 441 K/UL (ref 135–420)
PLATELET # BLD AUTO: 460 K/UL (ref 135–420)
PLATELET # BLD AUTO: 77 K/UL (ref 135–420)
PLATELET # BLD AUTO: 77 K/UL (ref 135–420)
PLATELET # BLD AUTO: 80 K/UL (ref 135–420)
PLATELET # BLD AUTO: 83 K/UL (ref 135–420)
PLATELET # BLD AUTO: 83 K/UL (ref 135–420)
PLATELET # BLD AUTO: 98 K/UL (ref 135–420)
PLATELET # BLD AUTO: 99 K/UL (ref 135–420)
PLATELET COMMENTS,PCOM: ABNORMAL
PLATELET FACTOR 4,XPF4T: NEGATIVE
PMV BLD AUTO: 10 FL (ref 9.2–11.8)
PMV BLD AUTO: 10 FL (ref 9.2–11.8)
PMV BLD AUTO: 10.1 FL (ref 9.2–11.8)
PMV BLD AUTO: 10.1 FL (ref 9.2–11.8)
PMV BLD AUTO: 10.2 FL (ref 9.2–11.8)
PMV BLD AUTO: 10.5 FL (ref 9.2–11.8)
PMV BLD AUTO: 10.6 FL (ref 9.2–11.8)
PMV BLD AUTO: 10.7 FL (ref 9.2–11.8)
PMV BLD AUTO: 10.8 FL (ref 9.2–11.8)
PMV BLD AUTO: 10.9 FL (ref 9.2–11.8)
PMV BLD AUTO: 11.3 FL (ref 9.2–11.8)
PMV BLD AUTO: 11.7 FL (ref 9.2–11.8)
PMV BLD AUTO: 11.8 FL (ref 9.2–11.8)
PMV BLD AUTO: 11.8 FL (ref 9.2–11.8)
PMV BLD AUTO: 8.9 FL (ref 9.2–11.8)
PMV BLD AUTO: 9 FL (ref 9.2–11.8)
PMV BLD AUTO: 9.1 FL (ref 9.2–11.8)
PMV BLD AUTO: 9.1 FL (ref 9.2–11.8)
PMV BLD AUTO: 9.2 FL (ref 9.2–11.8)
PMV BLD AUTO: 9.4 FL (ref 9.2–11.8)
PMV BLD AUTO: 9.5 FL (ref 9.2–11.8)
PMV BLD AUTO: 9.6 FL (ref 9.2–11.8)
PMV BLD AUTO: 9.6 FL (ref 9.2–11.8)
PMV BLD AUTO: 9.7 FL (ref 9.2–11.8)
PMV BLD AUTO: 9.8 FL (ref 9.2–11.8)
PMV BLD AUTO: 9.9 FL (ref 9.2–11.8)
PMV BLD AUTO: 9.9 FL (ref 9.2–11.8)
PO2 BLD: 111 MMHG (ref 80–100)
PO2 BLD: 123 MMHG (ref 80–100)
PO2 BLD: 125 MMHG (ref 80–100)
PO2 BLD: 231 MMHG (ref 80–100)
PO2 BLD: 56 MMHG (ref 80–100)
PO2 BLD: 68 MMHG (ref 80–100)
PO2 BLD: 73 MMHG (ref 80–100)
PO2 BLD: 86 MMHG (ref 80–100)
PO2 BLD: 87 MMHG (ref 80–100)
PO2 BLD: 95 MMHG (ref 80–100)
PO2 BLD: 96 MMHG (ref 80–100)
POTASSIUM SERPL-SCNC: 2.5 MMOL/L (ref 3.5–5.5)
POTASSIUM SERPL-SCNC: 2.5 MMOL/L (ref 3.5–5.5)
POTASSIUM SERPL-SCNC: 2.6 MMOL/L (ref 3.5–5.5)
POTASSIUM SERPL-SCNC: 2.8 MMOL/L (ref 3.5–5.5)
POTASSIUM SERPL-SCNC: 2.9 MMOL/L (ref 3.5–5.5)
POTASSIUM SERPL-SCNC: 3 MMOL/L (ref 3.5–5.5)
POTASSIUM SERPL-SCNC: 3.1 MMOL/L (ref 3.5–5.5)
POTASSIUM SERPL-SCNC: 3.2 MMOL/L (ref 3.5–5.5)
POTASSIUM SERPL-SCNC: 3.2 MMOL/L (ref 3.5–5.5)
POTASSIUM SERPL-SCNC: 3.3 MMOL/L (ref 3.5–5.5)
POTASSIUM SERPL-SCNC: 3.4 MMOL/L (ref 3.5–5.5)
POTASSIUM SERPL-SCNC: 3.5 MMOL/L (ref 3.5–5.5)
POTASSIUM SERPL-SCNC: 3.6 MMOL/L (ref 3.5–5.5)
POTASSIUM SERPL-SCNC: 3.7 MMOL/L (ref 3.5–5.5)
POTASSIUM SERPL-SCNC: 3.7 MMOL/L (ref 3.5–5.5)
POTASSIUM SERPL-SCNC: 3.9 MMOL/L (ref 3.5–5.5)
POTASSIUM SERPL-SCNC: 4 MMOL/L (ref 3.5–5.5)
POTASSIUM SERPL-SCNC: 4.1 MMOL/L (ref 3.5–5.5)
POTASSIUM SERPL-SCNC: 4.2 MMOL/L (ref 3.5–5.5)
POTASSIUM SERPL-SCNC: 4.3 MMOL/L (ref 3.5–5.5)
PROCALCITONIN SERPL-MCNC: 0.13 NG/ML
PROCALCITONIN SERPL-MCNC: 0.19 NG/ML
PROT SERPL-MCNC: 4.3 G/DL (ref 6.4–8.2)
PROT SERPL-MCNC: 4.8 G/DL (ref 6.4–8.2)
PROT SERPL-MCNC: 4.9 G/DL (ref 6.4–8.2)
PROT SERPL-MCNC: 4.9 G/DL (ref 6.4–8.2)
PROT SERPL-MCNC: 5.1 G/DL (ref 6.4–8.2)
PROT SERPL-MCNC: 5.1 G/DL (ref 6.4–8.2)
PROT SERPL-MCNC: 5.2 G/DL (ref 6.4–8.2)
PROT SERPL-MCNC: 5.4 G/DL (ref 6.4–8.2)
PROT SERPL-MCNC: 5.5 G/DL (ref 6.4–8.2)
PROT SERPL-MCNC: 5.5 G/DL (ref 6.4–8.2)
PROT SERPL-MCNC: 5.6 G/DL (ref 6.4–8.2)
PROT SERPL-MCNC: 5.8 G/DL (ref 6.4–8.2)
PROT SERPL-MCNC: 5.9 G/DL (ref 6.4–8.2)
PROT SERPL-MCNC: 6.1 G/DL (ref 6.4–8.2)
PROT SERPL-MCNC: 6.2 G/DL (ref 6.4–8.2)
PROT SERPL-MCNC: 6.6 G/DL (ref 6.4–8.2)
PROT SERPL-MCNC: 7.8 G/DL (ref 6.4–8.2)
PROT SERPL-MCNC: 8.2 G/DL (ref 6.4–8.2)
PROT SERPL-MCNC: 8.9 G/DL (ref 6.4–8.2)
PROT UR STRIP-MCNC: 100 MG/DL
PROT UR STRIP-MCNC: NEGATIVE MG/DL
PROT UR STRIP-MCNC: NEGATIVE MG/DL
PROTHROMBIN TIME: 13.3 SEC (ref 11.5–15.2)
PROTHROMBIN TIME: 13.5 SEC (ref 11.5–15.2)
PROTHROMBIN TIME: 13.6 SEC (ref 11.5–15.2)
PROTHROMBIN TIME: 13.8 SEC (ref 11.5–15.2)
PROTHROMBIN TIME: 14 SEC (ref 11.5–15.2)
PROTHROMBIN TIME: 14.3 SEC (ref 11.5–15.2)
PROTHROMBIN TIME: 14.4 SEC (ref 11.5–15.2)
PROTHROMBIN TIME: 15.8 SEC (ref 11.5–15.2)
PROTHROMBIN TIME: 16.4 SEC (ref 11.5–15.2)
PROTHROMBIN TIME: 16.9 SEC (ref 11.5–15.2)
PROTHROMBIN TIME: 17.2 SEC (ref 11.5–15.2)
Q-T INTERVAL, ECG07: 256 MS
Q-T INTERVAL, ECG07: 256 MS
Q-T INTERVAL, ECG07: 296 MS
Q-T INTERVAL, ECG07: 314 MS
Q-T INTERVAL, ECG07: 344 MS
Q-T INTERVAL, ECG07: 348 MS
Q-T INTERVAL, ECG07: 348 MS
Q-T INTERVAL, ECG07: 352 MS
Q-T INTERVAL, ECG07: 376 MS
Q-T INTERVAL, ECG07: 426 MS
Q-T INTERVAL, ECG07: 430 MS
Q-T INTERVAL, ECG07: 440 MS
Q-T INTERVAL, ECG07: 482 MS
QRS DURATION, ECG06: 66 MS
QRS DURATION, ECG06: 66 MS
QRS DURATION, ECG06: 70 MS
QRS DURATION, ECG06: 72 MS
QRS DURATION, ECG06: 78 MS
QRS DURATION, ECG06: 80 MS
QRS DURATION, ECG06: 80 MS
QRS DURATION, ECG06: 82 MS
QRS DURATION, ECG06: 84 MS
QRS DURATION, ECG06: 86 MS
QRS DURATION, ECG06: 90 MS
QTC CALCULATION (BEZET), ECG08: 420 MS
QTC CALCULATION (BEZET), ECG08: 427 MS
QTC CALCULATION (BEZET), ECG08: 435 MS
QTC CALCULATION (BEZET), ECG08: 447 MS
QTC CALCULATION (BEZET), ECG08: 447 MS
QTC CALCULATION (BEZET), ECG08: 451 MS
QTC CALCULATION (BEZET), ECG08: 456 MS
QTC CALCULATION (BEZET), ECG08: 456 MS
QTC CALCULATION (BEZET), ECG08: 457 MS
QTC CALCULATION (BEZET), ECG08: 458 MS
QTC CALCULATION (BEZET), ECG08: 475 MS
QTC CALCULATION (BEZET), ECG08: 479 MS
QTC CALCULATION (BEZET), ECG08: 505 MS
RBC # BLD AUTO: 2.77 M/UL (ref 4.2–5.3)
RBC # BLD AUTO: 2.78 M/UL (ref 4.2–5.3)
RBC # BLD AUTO: 3.01 M/UL (ref 4.2–5.3)
RBC # BLD AUTO: 3.04 M/UL (ref 4.2–5.3)
RBC # BLD AUTO: 3.1 M/UL (ref 4.2–5.3)
RBC # BLD AUTO: 3.12 M/UL (ref 4.2–5.3)
RBC # BLD AUTO: 3.19 M/UL (ref 4.2–5.3)
RBC # BLD AUTO: 3.19 M/UL (ref 4.2–5.3)
RBC # BLD AUTO: 3.23 M/UL (ref 4.2–5.3)
RBC # BLD AUTO: 3.34 M/UL (ref 4.2–5.3)
RBC # BLD AUTO: 3.39 M/UL (ref 4.2–5.3)
RBC # BLD AUTO: 3.42 M/UL (ref 4.2–5.3)
RBC # BLD AUTO: 3.46 M/UL (ref 4.2–5.3)
RBC # BLD AUTO: 3.48 M/UL (ref 4.2–5.3)
RBC # BLD AUTO: 3.52 M/UL (ref 4.2–5.3)
RBC # BLD AUTO: 3.56 M/UL (ref 4.2–5.3)
RBC # BLD AUTO: 3.57 M/UL (ref 4.2–5.3)
RBC # BLD AUTO: 3.59 M/UL (ref 4.2–5.3)
RBC # BLD AUTO: 3.61 M/UL (ref 4.2–5.3)
RBC # BLD AUTO: 3.66 M/UL (ref 4.2–5.3)
RBC # BLD AUTO: 3.76 M/UL (ref 4.2–5.3)
RBC # BLD AUTO: 3.77 M/UL (ref 4.2–5.3)
RBC # BLD AUTO: 3.77 M/UL (ref 4.2–5.3)
RBC # BLD AUTO: 3.79 M/UL (ref 4.2–5.3)
RBC # BLD AUTO: 3.8 M/UL (ref 4.2–5.3)
RBC # BLD AUTO: 3.92 M/UL (ref 4.2–5.3)
RBC # BLD AUTO: 3.93 M/UL (ref 4.2–5.3)
RBC # BLD AUTO: 3.98 M/UL (ref 4.2–5.3)
RBC # BLD AUTO: 4.18 M/UL (ref 4.2–5.3)
RBC # BLD AUTO: 4.38 M/UL (ref 4.2–5.3)
RBC # BLD AUTO: 4.42 M/UL (ref 4.2–5.3)
RBC # BLD AUTO: 4.48 M/UL (ref 4.2–5.3)
RBC # BLD AUTO: 4.97 M/UL (ref 4.2–5.3)
RBC # BLD AUTO: 5.21 M/UL (ref 4.2–5.3)
RBC # BLD AUTO: 5.9 M/UL (ref 4.2–5.3)
RBC #/AREA URNS HPF: ABNORMAL /HPF (ref 0–5)
RBC #/AREA URNS HPF: ABNORMAL /HPF (ref 0–5)
RBC MORPH BLD: ABNORMAL
REPORTED DOSE,DOSE: ABNORMAL UNITS
REPORTED DOSE,DOSE: NORMAL UNITS
REPORTED DOSE/TIME,TMG: 1200
REPORTED DOSE/TIME,TMG: 1200
REPORTED DOSE/TIME,TMG: 2100
REPORTED DOSE/TIME,TMG: 400
REPORTED DOSE/TIME,TMG: 900
RSV SPEC QL CULT: NOT DETECTED
RSV SPEC QL CULT: NOT DETECTED
S PNEUM AG UR QL: NEGATIVE
S PNEUM AG UR QL: NEGATIVE
SAO2 % BLD: 100 % (ref 92–97)
SAO2 % BLD: 89 % (ref 92–97)
SAO2 % BLD: 93 % (ref 92–97)
SAO2 % BLD: 95 % (ref 92–97)
SAO2 % BLD: 95 % (ref 92–97)
SAO2 % BLD: 97 % (ref 92–97)
SAO2 % BLD: 97 % (ref 92–97)
SAO2 % BLD: 98 % (ref 92–97)
SAO2 % BLD: 99 % (ref 92–97)
SARS-COV-2, COV2NT: NOT DETECTED
SARS-COV-2, COV2NT: NOT DETECTED
SERVICE CMNT-IMP: ABNORMAL
SERVICE CMNT-IMP: NORMAL
SODIUM SERPL-SCNC: 134 MMOL/L (ref 136–145)
SODIUM SERPL-SCNC: 135 MMOL/L (ref 136–145)
SODIUM SERPL-SCNC: 136 MMOL/L (ref 136–145)
SODIUM SERPL-SCNC: 137 MMOL/L (ref 136–145)
SODIUM SERPL-SCNC: 138 MMOL/L (ref 136–145)
SODIUM SERPL-SCNC: 139 MMOL/L (ref 136–145)
SODIUM SERPL-SCNC: 140 MMOL/L (ref 136–145)
SODIUM SERPL-SCNC: 141 MMOL/L (ref 136–145)
SODIUM SERPL-SCNC: 141 MMOL/L (ref 136–145)
SODIUM SERPL-SCNC: 142 MMOL/L (ref 136–145)
SODIUM SERPL-SCNC: 143 MMOL/L (ref 136–145)
SODIUM SERPL-SCNC: 145 MMOL/L (ref 136–145)
SODIUM SERPL-SCNC: 146 MMOL/L (ref 136–145)
SODIUM SERPL-SCNC: 147 MMOL/L (ref 136–145)
SODIUM SERPL-SCNC: 148 MMOL/L (ref 136–145)
SODIUM SERPL-SCNC: 149 MMOL/L (ref 136–145)
SODIUM SERPL-SCNC: 149 MMOL/L (ref 136–145)
SODIUM SERPL-SCNC: 150 MMOL/L (ref 136–145)
SODIUM SERPL-SCNC: 150 MMOL/L (ref 136–145)
SODIUM SERPL-SCNC: 151 MMOL/L (ref 136–145)
SOURCE, RSRC56: NORMAL
SOURCE, RSRC56: NORMAL
SP GR UR REFRACTOMETRY: 1 (ref 1–1.03)
SP GR UR REFRACTOMETRY: 1.02 (ref 1–1.03)
SP GR UR REFRACTOMETRY: 1.03 (ref 1–1.03)
SPECIMEN EXP DATE BLD: NORMAL
SPECIMEN TYPE: ABNORMAL
STATUS OF UNIT,%ST: NORMAL
STATUS OF UNIT,%ST: NORMAL
STRESS BASELINE DIAS BP: 65 MMHG
STRESS BASELINE HR: 65 BPM
STRESS BASELINE SYS BP: 103 MMHG
STRESS ESTIMATED WORKLOAD: 1 METS
STRESS EXERCISE DUR MIN: NORMAL
STRESS PEAK DIAS BP: 67 MMHG
STRESS PEAK SYS BP: 135 MMHG
STRESS PERCENT HR ACHIEVED: 63 %
STRESS POST PEAK HR: 99 BPM
STRESS RATE PRESSURE PRODUCT: NORMAL BPM*MMHG
STRESS ST DEPRESSION: 0 MM
STRESS ST ELEVATION: 0 MM
STRESS TARGET HR: 157 BPM
T3FREE SERPL-MCNC: 0.8 PG/ML (ref 2.18–3.98)
T4 FREE SERPL-MCNC: 0.6 NG/DL (ref 0.7–1.5)
TIBC SERPL-MCNC: 283 UG/DL (ref 250–450)
TOTAL RESP. RATE, ITRR: 14
TOTAL RESP. RATE, ITRR: 16
TOTAL RESP. RATE, ITRR: 17
TOTAL RESP. RATE, ITRR: 19
TOTAL RESP. RATE, ITRR: 20
TOTAL RESP. RATE, ITRR: 24
TOTAL RESP. RATE, ITRR: 25
TROPONIN I SERPL-MCNC: 0.03 NG/ML (ref 0–0.04)
TROPONIN I SERPL-MCNC: 0.04 NG/ML (ref 0–0.04)
TROPONIN I SERPL-MCNC: 0.04 NG/ML (ref 0–0.04)
TROPONIN I SERPL-MCNC: 0.19 NG/ML (ref 0–0.04)
TROPONIN I SERPL-MCNC: 0.49 NG/ML (ref 0–0.04)
TROPONIN I SERPL-MCNC: 0.49 NG/ML (ref 0–0.04)
TROPONIN I SERPL-MCNC: 0.51 NG/ML (ref 0–0.04)
TROPONIN I SERPL-MCNC: 0.57 NG/ML (ref 0–0.04)
TROPONIN I SERPL-MCNC: 1.6 NG/ML (ref 0–0.04)
TROPONIN I SERPL-MCNC: 1.69 NG/ML (ref 0–0.04)
TSH SERPL DL<=0.05 MIU/L-ACNC: 0.53 UIU/ML (ref 0.36–3.74)
TSH SERPL DL<=0.05 MIU/L-ACNC: 5.11 UIU/ML (ref 0.36–3.74)
UNIT DIVISION, %UDIV: 0
UNIT DIVISION, %UDIV: 0
UROBILINOGEN UR QL STRIP.AUTO: 0.2 EU/DL (ref 0.2–1)
UROBILINOGEN UR QL STRIP.AUTO: 0.2 EU/DL (ref 0.2–1)
UROBILINOGEN UR QL STRIP.AUTO: 1 EU/DL (ref 0.2–1)
VANCOMYCIN TROUGH SERPL-MCNC: 13.3 UG/ML (ref 10–20)
VANCOMYCIN TROUGH SERPL-MCNC: 14.8 UG/ML (ref 10–20)
VANCOMYCIN TROUGH SERPL-MCNC: 15.2 UG/ML (ref 10–20)
VANCOMYCIN TROUGH SERPL-MCNC: 16.4 UG/ML (ref 10–20)
VANCOMYCIN TROUGH SERPL-MCNC: 18.7 UG/ML (ref 10–20)
VANCOMYCIN TROUGH SERPL-MCNC: 34.6 UG/ML (ref 10–20)
VENTILATION MODE VENT: ABNORMAL
VENTRICULAR RATE, ECG03: 101 BPM
VENTRICULAR RATE, ECG03: 114 BPM
VENTRICULAR RATE, ECG03: 128 BPM
VENTRICULAR RATE, ECG03: 143 BPM
VENTRICULAR RATE, ECG03: 167 BPM
VENTRICULAR RATE, ECG03: 174 BPM
VENTRICULAR RATE, ECG03: 65 BPM
VENTRICULAR RATE, ECG03: 65 BPM
VENTRICULAR RATE, ECG03: 66 BPM
VENTRICULAR RATE, ECG03: 69 BPM
VENTRICULAR RATE, ECG03: 90 BPM
VENTRICULAR RATE, ECG03: 96 BPM
VENTRICULAR RATE, ECG03: 97 BPM
VIT B12 SERPL-MCNC: 910 PG/ML (ref 211–911)
VOLUME CONTROL PLUS IVLCP: YES
VT SETTING VENT: 420 ML
VT SETTING VENT: 450 ML
WBC # BLD AUTO: 10.2 K/UL (ref 4.6–13.2)
WBC # BLD AUTO: 10.3 K/UL (ref 4.6–13.2)
WBC # BLD AUTO: 10.5 K/UL (ref 4.6–13.2)
WBC # BLD AUTO: 10.6 K/UL (ref 4.6–13.2)
WBC # BLD AUTO: 10.8 K/UL (ref 4.6–13.2)
WBC # BLD AUTO: 10.8 K/UL (ref 4.6–13.2)
WBC # BLD AUTO: 10.9 K/UL (ref 4.6–13.2)
WBC # BLD AUTO: 11.1 K/UL (ref 4.6–13.2)
WBC # BLD AUTO: 11.3 K/UL (ref 4.6–13.2)
WBC # BLD AUTO: 11.4 K/UL (ref 4.6–13.2)
WBC # BLD AUTO: 12 K/UL (ref 4.6–13.2)
WBC # BLD AUTO: 12.1 K/UL (ref 4.6–13.2)
WBC # BLD AUTO: 13.4 K/UL (ref 4.6–13.2)
WBC # BLD AUTO: 16.1 K/UL (ref 4.6–13.2)
WBC # BLD AUTO: 17.5 K/UL (ref 4.6–13.2)
WBC # BLD AUTO: 17.5 K/UL (ref 4.6–13.2)
WBC # BLD AUTO: 18.6 K/UL (ref 4.6–13.2)
WBC # BLD AUTO: 20.8 K/UL (ref 4.6–13.2)
WBC # BLD AUTO: 23.9 K/UL (ref 4.6–13.2)
WBC # BLD AUTO: 26.1 K/UL (ref 4.6–13.2)
WBC # BLD AUTO: 6.4 K/UL (ref 4.6–13.2)
WBC # BLD AUTO: 6.6 K/UL (ref 4.6–13.2)
WBC # BLD AUTO: 7 K/UL (ref 4.6–13.2)
WBC # BLD AUTO: 7.3 K/UL (ref 4.6–13.2)
WBC # BLD AUTO: 7.5 K/UL (ref 4.6–13.2)
WBC # BLD AUTO: 7.9 K/UL (ref 4.6–13.2)
WBC # BLD AUTO: 7.9 K/UL (ref 4.6–13.2)
WBC # BLD AUTO: 8.5 K/UL (ref 4.6–13.2)
WBC # BLD AUTO: 8.8 K/UL (ref 4.6–13.2)
WBC # BLD AUTO: 8.9 K/UL (ref 4.6–13.2)
WBC # BLD AUTO: 9.2 K/UL (ref 4.6–13.2)
WBC # BLD AUTO: 9.4 K/UL (ref 4.6–13.2)
WBC # BLD AUTO: 9.9 K/UL (ref 4.6–13.2)
WBC URNS QL MICRO: >100 /HPF (ref 0–4)
WBC URNS QL MICRO: ABNORMAL /HPF (ref 0–4)

## 2020-01-01 PROCEDURE — 80053 COMPREHEN METABOLIC PANEL: CPT

## 2020-01-01 PROCEDURE — 74011250637 HC RX REV CODE- 250/637: Performed by: INTERNAL MEDICINE

## 2020-01-01 PROCEDURE — 92526 ORAL FUNCTION THERAPY: CPT

## 2020-01-01 PROCEDURE — 85610 PROTHROMBIN TIME: CPT

## 2020-01-01 PROCEDURE — 0100U RESPIRATORY PANEL,PCR,NASOPHARYNGEAL: CPT

## 2020-01-01 PROCEDURE — 74011636637 HC RX REV CODE- 636/637: Performed by: HOSPITALIST

## 2020-01-01 PROCEDURE — 74011250636 HC RX REV CODE- 250/636: Performed by: INTERNAL MEDICINE

## 2020-01-01 PROCEDURE — 36600 WITHDRAWAL OF ARTERIAL BLOOD: CPT

## 2020-01-01 PROCEDURE — 74011250636 HC RX REV CODE- 250/636: Performed by: NURSE PRACTITIONER

## 2020-01-01 PROCEDURE — 77030038269 HC DRN EXT URIN PURWCK BARD -A

## 2020-01-01 PROCEDURE — 82962 GLUCOSE BLOOD TEST: CPT

## 2020-01-01 PROCEDURE — 74011250636 HC RX REV CODE- 250/636: Performed by: STUDENT IN AN ORGANIZED HEALTH CARE EDUCATION/TRAINING PROGRAM

## 2020-01-01 PROCEDURE — 74011000250 HC RX REV CODE- 250

## 2020-01-01 PROCEDURE — 80048 BASIC METABOLIC PNL TOTAL CA: CPT

## 2020-01-01 PROCEDURE — 82330 ASSAY OF CALCIUM: CPT

## 2020-01-01 PROCEDURE — 77010033678 HC OXYGEN DAILY

## 2020-01-01 PROCEDURE — 74011250637 HC RX REV CODE- 250/637: Performed by: HOSPITALIST

## 2020-01-01 PROCEDURE — 77010033678 HC OXYGEN DAILY: Performed by: INTERNAL MEDICINE

## 2020-01-01 PROCEDURE — 74011250636 HC RX REV CODE- 250/636: Performed by: PHYSICIAN ASSISTANT

## 2020-01-01 PROCEDURE — 94640 AIRWAY INHALATION TREATMENT: CPT

## 2020-01-01 PROCEDURE — 0099U: CPT

## 2020-01-01 PROCEDURE — 74011250636 HC RX REV CODE- 250/636: Performed by: HOSPITALIST

## 2020-01-01 PROCEDURE — 94400 HC END TIDAL CO2 RESPONSE CURVE: CPT

## 2020-01-01 PROCEDURE — 77030033045 HC ADMN ST IV BLD WRMR SIMS -B: Performed by: SURGERY

## 2020-01-01 PROCEDURE — 74011636637 HC RX REV CODE- 636/637: Performed by: INTERNAL MEDICINE

## 2020-01-01 PROCEDURE — 94760 N-INVAS EAR/PLS OXIMETRY 1: CPT

## 2020-01-01 PROCEDURE — 74011000258 HC RX REV CODE- 258: Performed by: INTERNAL MEDICINE

## 2020-01-01 PROCEDURE — 84100 ASSAY OF PHOSPHORUS: CPT

## 2020-01-01 PROCEDURE — G0158 HHC OT ASSISTANT EA 15: HCPCS

## 2020-01-01 PROCEDURE — 86140 C-REACTIVE PROTEIN: CPT

## 2020-01-01 PROCEDURE — 77030018798 HC PMP KT ENTRL FED COVD -A

## 2020-01-01 PROCEDURE — 74011250636 HC RX REV CODE- 250/636: Performed by: EMERGENCY MEDICINE

## 2020-01-01 PROCEDURE — 74011000250 HC RX REV CODE- 250: Performed by: SURGERY

## 2020-01-01 PROCEDURE — 94668 MNPJ CHEST WALL SBSQ: CPT

## 2020-01-01 PROCEDURE — 85379 FIBRIN DEGRADATION QUANT: CPT

## 2020-01-01 PROCEDURE — 85025 COMPLETE CBC W/AUTO DIFF WBC: CPT

## 2020-01-01 PROCEDURE — 87186 SC STD MICRODIL/AGAR DIL: CPT

## 2020-01-01 PROCEDURE — 82550 ASSAY OF CK (CPK): CPT

## 2020-01-01 PROCEDURE — 74011000250 HC RX REV CODE- 250: Performed by: EMERGENCY MEDICINE

## 2020-01-01 PROCEDURE — 77030013076 HC PCH OST BAG COLO -A

## 2020-01-01 PROCEDURE — 74011250637 HC RX REV CODE- 250/637: Performed by: FAMILY MEDICINE

## 2020-01-01 PROCEDURE — 71045 X-RAY EXAM CHEST 1 VIEW: CPT

## 2020-01-01 PROCEDURE — 87040 BLOOD CULTURE FOR BACTERIA: CPT

## 2020-01-01 PROCEDURE — C9113 INJ PANTOPRAZOLE SODIUM, VIA: HCPCS | Performed by: PHYSICIAN ASSISTANT

## 2020-01-01 PROCEDURE — 83605 ASSAY OF LACTIC ACID: CPT

## 2020-01-01 PROCEDURE — 74011000250 HC RX REV CODE- 250: Performed by: INTERNAL MEDICINE

## 2020-01-01 PROCEDURE — 74011250636 HC RX REV CODE- 250/636: Performed by: SURGERY

## 2020-01-01 PROCEDURE — 74011250637 HC RX REV CODE- 250/637: Performed by: NURSE PRACTITIONER

## 2020-01-01 PROCEDURE — 82803 BLOOD GASES ANY COMBINATION: CPT

## 2020-01-01 PROCEDURE — A4393 URINE PCH W EX WEAR BAR CONV: HCPCS

## 2020-01-01 PROCEDURE — 74011250637 HC RX REV CODE- 250/637: Performed by: PHYSICIAN ASSISTANT

## 2020-01-01 PROCEDURE — G0157 HHC PT ASSISTANT EA 15: HCPCS

## 2020-01-01 PROCEDURE — 83735 ASSAY OF MAGNESIUM: CPT

## 2020-01-01 PROCEDURE — C9113 INJ PANTOPRAZOLE SODIUM, VIA: HCPCS | Performed by: SURGERY

## 2020-01-01 PROCEDURE — 74011000258 HC RX REV CODE- 258: Performed by: SURGERY

## 2020-01-01 PROCEDURE — 83615 LACTATE (LD) (LDH) ENZYME: CPT

## 2020-01-01 PROCEDURE — 77030012893

## 2020-01-01 PROCEDURE — 97535 SELF CARE MNGMENT TRAINING: CPT

## 2020-01-01 PROCEDURE — 85730 THROMBOPLASTIN TIME PARTIAL: CPT

## 2020-01-01 PROCEDURE — 94762 N-INVAS EAR/PLS OXIMTRY CONT: CPT

## 2020-01-01 PROCEDURE — 85384 FIBRINOGEN ACTIVITY: CPT

## 2020-01-01 PROCEDURE — 97166 OT EVAL MOD COMPLEX 45 MIN: CPT

## 2020-01-01 PROCEDURE — G0152 HHCP-SERV OF OT,EA 15 MIN: HCPCS

## 2020-01-01 PROCEDURE — 74011636637 HC RX REV CODE- 636/637: Performed by: SURGERY

## 2020-01-01 PROCEDURE — 36415 COLL VENOUS BLD VENIPUNCTURE: CPT

## 2020-01-01 PROCEDURE — G0299 HHS/HOSPICE OF RN EA 15 MIN: HCPCS

## 2020-01-01 PROCEDURE — 88305 TISSUE EXAM BY PATHOLOGIST: CPT

## 2020-01-01 PROCEDURE — 94002 VENT MGMT INPAT INIT DAY: CPT

## 2020-01-01 PROCEDURE — 83550 IRON BINDING TEST: CPT

## 2020-01-01 PROCEDURE — 87449 NOS EACH ORGANISM AG IA: CPT

## 2020-01-01 PROCEDURE — 77030019952 HC CANSTR VAC ASST KCON -B

## 2020-01-01 PROCEDURE — A4452 WATERPROOF TAPE: HCPCS

## 2020-01-01 PROCEDURE — 93005 ELECTROCARDIOGRAM TRACING: CPT

## 2020-01-01 PROCEDURE — 97530 THERAPEUTIC ACTIVITIES: CPT

## 2020-01-01 PROCEDURE — 5A1955Z RESPIRATORY VENTILATION, GREATER THAN 96 CONSECUTIVE HOURS: ICD-10-PCS | Performed by: EMERGENCY MEDICINE

## 2020-01-01 PROCEDURE — 88309 TISSUE EXAM BY PATHOLOGIST: CPT

## 2020-01-01 PROCEDURE — 74011000250 HC RX REV CODE- 250: Performed by: HOSPITALIST

## 2020-01-01 PROCEDURE — 87070 CULTURE OTHR SPECIMN AEROBIC: CPT

## 2020-01-01 PROCEDURE — 74011000258 HC RX REV CODE- 258: Performed by: PHYSICIAN ASSISTANT

## 2020-01-01 PROCEDURE — 77030009979 HC RELD STPLR TCR J&J -C: Performed by: SURGERY

## 2020-01-01 PROCEDURE — 97110 THERAPEUTIC EXERCISES: CPT

## 2020-01-01 PROCEDURE — 65270000029 HC RM PRIVATE

## 2020-01-01 PROCEDURE — 77010033711 HC HIGH FLOW OXYGEN

## 2020-01-01 PROCEDURE — 65660000000 HC RM CCU STEPDOWN

## 2020-01-01 PROCEDURE — 76060000033 HC ANESTHESIA 1 TO 1.5 HR: Performed by: SURGERY

## 2020-01-01 PROCEDURE — 74011636637 HC RX REV CODE- 636/637: Performed by: FAMILY MEDICINE

## 2020-01-01 PROCEDURE — 74011636637 HC RX REV CODE- 636/637: Performed by: EMERGENCY MEDICINE

## 2020-01-01 PROCEDURE — 65310000000 HC RM PRIVATE REHAB

## 2020-01-01 PROCEDURE — 77030012890

## 2020-01-01 PROCEDURE — 74176 CT ABD & PELVIS W/O CONTRAST: CPT

## 2020-01-01 PROCEDURE — 87076 CULTURE ANAEROBE IDENT EACH: CPT

## 2020-01-01 PROCEDURE — 97162 PT EVAL MOD COMPLEX 30 MIN: CPT

## 2020-01-01 PROCEDURE — 96366 THER/PROPH/DIAG IV INF ADDON: CPT

## 2020-01-01 PROCEDURE — G0300 HHS/HOSPICE OF LPN EA 15 MIN: HCPCS

## 2020-01-01 PROCEDURE — 03HY32Z INSERTION OF MONITORING DEVICE INTO UPPER ARTERY, PERCUTANEOUS APPROACH: ICD-10-PCS | Performed by: INTERNAL MEDICINE

## 2020-01-01 PROCEDURE — 86921 COMPATIBILITY TEST INCUBATE: CPT

## 2020-01-01 PROCEDURE — 74011636637 HC RX REV CODE- 636/637: Performed by: NURSE PRACTITIONER

## 2020-01-01 PROCEDURE — 65610000006 HC RM INTENSIVE CARE

## 2020-01-01 PROCEDURE — 74011250636 HC RX REV CODE- 250/636

## 2020-01-01 PROCEDURE — 74011000250 HC RX REV CODE- 250: Performed by: FAMILY MEDICINE

## 2020-01-01 PROCEDURE — C9113 INJ PANTOPRAZOLE SODIUM, VIA: HCPCS | Performed by: HOSPITALIST

## 2020-01-01 PROCEDURE — 51702 INSERT TEMP BLADDER CATH: CPT

## 2020-01-01 PROCEDURE — 82728 ASSAY OF FERRITIN: CPT

## 2020-01-01 PROCEDURE — 97165 OT EVAL LOW COMPLEX 30 MIN: CPT

## 2020-01-01 PROCEDURE — 84443 ASSAY THYROID STIM HORMONE: CPT

## 2020-01-01 PROCEDURE — 74011250636 HC RX REV CODE- 250/636: Performed by: NURSE ANESTHETIST, CERTIFIED REGISTERED

## 2020-01-01 PROCEDURE — 74011000258 HC RX REV CODE- 258

## 2020-01-01 PROCEDURE — 3E1M38Z IRRIGATION OF PERITONEAL CAVITY USING IRRIGATING SUBSTANCE, PERCUTANEOUS APPROACH: ICD-10-PCS | Performed by: SURGERY

## 2020-01-01 PROCEDURE — 83036 HEMOGLOBIN GLYCOSYLATED A1C: CPT

## 2020-01-01 PROCEDURE — A6212 FOAM DRG <=16 SQ IN W/BORDER: HCPCS

## 2020-01-01 PROCEDURE — 75810000455 HC PLCMT CENT VENOUS CATH LVL 2 5182

## 2020-01-01 PROCEDURE — 86870 RBC ANTIBODY IDENTIFICATION: CPT

## 2020-01-01 PROCEDURE — 87077 CULTURE AEROBIC IDENTIFY: CPT

## 2020-01-01 PROCEDURE — 77030018846 HC SOL IRR STRL H20 ICUM -A

## 2020-01-01 PROCEDURE — 97542 WHEELCHAIR MNGMENT TRAINING: CPT

## 2020-01-01 PROCEDURE — 81001 URINALYSIS AUTO W/SCOPE: CPT

## 2020-01-01 PROCEDURE — 87804 INFLUENZA ASSAY W/OPTIC: CPT

## 2020-01-01 PROCEDURE — 74011000250 HC RX REV CODE- 250: Performed by: PHYSICIAN ASSISTANT

## 2020-01-01 PROCEDURE — 96368 THER/DIAG CONCURRENT INF: CPT

## 2020-01-01 PROCEDURE — G0151 HHCP-SERV OF PT,EA 15 MIN: HCPCS

## 2020-01-01 PROCEDURE — 96360 HYDRATION IV INFUSION INIT: CPT

## 2020-01-01 PROCEDURE — 85018 HEMOGLOBIN: CPT

## 2020-01-01 PROCEDURE — 77030003029 HC SUT VCRL J&J -B: Performed by: SURGERY

## 2020-01-01 PROCEDURE — 77030040393 HC DRSG OPTIFOAM GENT MDII -B

## 2020-01-01 PROCEDURE — 77030040392 HC DRSG OPTIFOAM MDII -A

## 2020-01-01 PROCEDURE — 77030002996 HC SUT SLK J&J -A: Performed by: SURGERY

## 2020-01-01 PROCEDURE — 80076 HEPATIC FUNCTION PANEL: CPT

## 2020-01-01 PROCEDURE — 77030025414 HC DRSG VAC ASST SMPLC KCON -B

## 2020-01-01 PROCEDURE — 77030018795 HC PASTE OST COLO -B

## 2020-01-01 PROCEDURE — 0D1L0Z4 BYPASS TRANSVERSE COLON TO CUTANEOUS, OPEN APPROACH: ICD-10-PCS | Performed by: SURGERY

## 2020-01-01 PROCEDURE — 74011250637 HC RX REV CODE- 250/637: Performed by: EMERGENCY MEDICINE

## 2020-01-01 PROCEDURE — 76705 ECHO EXAM OF ABDOMEN: CPT

## 2020-01-01 PROCEDURE — 74011000258 HC RX REV CODE- 258: Performed by: FAMILY MEDICINE

## 2020-01-01 PROCEDURE — 65660000004 HC RM CVT STEPDOWN

## 2020-01-01 PROCEDURE — 80307 DRUG TEST PRSMV CHEM ANLYZR: CPT

## 2020-01-01 PROCEDURE — 77030008771 HC TU NG SALEM SUMP -A

## 2020-01-01 PROCEDURE — 36592 COLLECT BLOOD FROM PICC: CPT

## 2020-01-01 PROCEDURE — 94761 N-INVAS EAR/PLS OXIMETRY MLT: CPT

## 2020-01-01 PROCEDURE — 74011250636 HC RX REV CODE- 250/636: Performed by: FAMILY MEDICINE

## 2020-01-01 PROCEDURE — 74011636637 HC RX REV CODE- 636/637: Performed by: PHYSICIAN ASSISTANT

## 2020-01-01 PROCEDURE — 96375 TX/PRO/DX INJ NEW DRUG ADDON: CPT

## 2020-01-01 PROCEDURE — 80202 ASSAY OF VANCOMYCIN: CPT

## 2020-01-01 PROCEDURE — 86900 BLOOD TYPING SEROLOGIC ABO: CPT

## 2020-01-01 PROCEDURE — 85652 RBC SED RATE AUTOMATED: CPT

## 2020-01-01 PROCEDURE — P9045 ALBUMIN (HUMAN), 5%, 250 ML: HCPCS | Performed by: INTERNAL MEDICINE

## 2020-01-01 PROCEDURE — 31500 INSERT EMERGENCY AIRWAY: CPT

## 2020-01-01 PROCEDURE — 92610 EVALUATE SWALLOWING FUNCTION: CPT

## 2020-01-01 PROCEDURE — 77030040830 HC CATH URETH FOL MDII -A: Performed by: SURGERY

## 2020-01-01 PROCEDURE — 77030036731 HC STPLR ENDOSC J&J -F: Performed by: SURGERY

## 2020-01-01 PROCEDURE — A4416 OST PCH CLSD W BARRIER/FILTR: HCPCS

## 2020-01-01 PROCEDURE — 83880 ASSAY OF NATRIURETIC PEPTIDE: CPT

## 2020-01-01 PROCEDURE — 77030011278 HC ELECTRD LIG IMPT COVD -F: Performed by: SURGERY

## 2020-01-01 PROCEDURE — 84484 ASSAY OF TROPONIN QUANT: CPT

## 2020-01-01 PROCEDURE — P9047 ALBUMIN (HUMAN), 25%, 50ML: HCPCS

## 2020-01-01 PROCEDURE — 77030041175 HC BAG DIGNSHLD BARD -A

## 2020-01-01 PROCEDURE — A6216 NON-STERILE GAUZE<=16 SQ IN: HCPCS

## 2020-01-01 PROCEDURE — 82607 VITAMIN B-12: CPT

## 2020-01-01 PROCEDURE — 85027 COMPLETE CBC AUTOMATED: CPT

## 2020-01-01 PROCEDURE — 96365 THER/PROPH/DIAG IV INF INIT: CPT

## 2020-01-01 PROCEDURE — 94003 VENT MGMT INPAT SUBQ DAY: CPT

## 2020-01-01 PROCEDURE — 84481 FREE ASSAY (FT-3): CPT

## 2020-01-01 PROCEDURE — P9047 ALBUMIN (HUMAN), 25%, 50ML: HCPCS | Performed by: NURSE ANESTHETIST, CERTIFIED REGISTERED

## 2020-01-01 PROCEDURE — 99285 EMERGENCY DEPT VISIT HI MDM: CPT

## 2020-01-01 PROCEDURE — 92611 MOTION FLUOROSCOPY/SWALLOW: CPT

## 2020-01-01 PROCEDURE — 74011000255 HC RX REV CODE- 255: Performed by: HOSPITALIST

## 2020-01-01 PROCEDURE — P9045 ALBUMIN (HUMAN), 5%, 250 ML: HCPCS

## 2020-01-01 PROCEDURE — 90471 IMMUNIZATION ADMIN: CPT

## 2020-01-01 PROCEDURE — 87635 SARS-COV-2 COVID-19 AMP PRB: CPT

## 2020-01-01 PROCEDURE — 87450 LEGIONELLA PNEUMOPHILA AG, URINE: CPT

## 2020-01-01 PROCEDURE — 400013 HH SOC

## 2020-01-01 PROCEDURE — 77030031139 HC SUT VCRL2 J&J -A: Performed by: SURGERY

## 2020-01-01 PROCEDURE — 88307 TISSUE EXAM BY PATHOLOGIST: CPT

## 2020-01-01 PROCEDURE — 76937 US GUIDE VASCULAR ACCESS: CPT

## 2020-01-01 PROCEDURE — P9045 ALBUMIN (HUMAN), 5%, 250 ML: HCPCS | Performed by: PHYSICIAN ASSISTANT

## 2020-01-01 PROCEDURE — 84132 ASSAY OF SERUM POTASSIUM: CPT

## 2020-01-01 PROCEDURE — 71260 CT THORAX DX C+: CPT

## 2020-01-01 PROCEDURE — 86022 PLATELET ANTIBODIES: CPT

## 2020-01-01 PROCEDURE — 86922 COMPATIBILITY TEST ANTIGLOB: CPT

## 2020-01-01 PROCEDURE — 74011000250 HC RX REV CODE- 250: Performed by: NURSE ANESTHETIST, CERTIFIED REGISTERED

## 2020-01-01 PROCEDURE — 87086 URINE CULTURE/COLONY COUNT: CPT

## 2020-01-01 PROCEDURE — A5120 SKIN BARRIER, WIPE OR SWAB: HCPCS

## 2020-01-01 PROCEDURE — 74011250637 HC RX REV CODE- 250/637: Performed by: STUDENT IN AN ORGANIZED HEALTH CARE EDUCATION/TRAINING PROGRAM

## 2020-01-01 PROCEDURE — 02HV33Z INSERTION OF INFUSION DEVICE INTO SUPERIOR VENA CAVA, PERCUTANEOUS APPROACH: ICD-10-PCS | Performed by: EMERGENCY MEDICINE

## 2020-01-01 PROCEDURE — A6204 COMPOSITE DRSG >16<=48 SQ IN: HCPCS

## 2020-01-01 PROCEDURE — 97164 PT RE-EVAL EST PLAN CARE: CPT

## 2020-01-01 PROCEDURE — A9500 TC99M SESTAMIBI: HCPCS

## 2020-01-01 PROCEDURE — 77030013140 HC MSK NEB VYRM -A

## 2020-01-01 PROCEDURE — 84145 PROCALCITONIN (PCT): CPT

## 2020-01-01 PROCEDURE — 94660 CPAP INITIATION&MGMT: CPT

## 2020-01-01 PROCEDURE — 77030040162

## 2020-01-01 PROCEDURE — 74011000250 HC RX REV CODE- 250: Performed by: STUDENT IN AN ORGANIZED HEALTH CARE EDUCATION/TRAINING PROGRAM

## 2020-01-01 PROCEDURE — 0BH17EZ INSERTION OF ENDOTRACHEAL AIRWAY INTO TRACHEA, VIA NATURAL OR ARTIFICIAL OPENING: ICD-10-PCS | Performed by: EMERGENCY MEDICINE

## 2020-01-01 PROCEDURE — 77030013567 HC DRN WND RESERV BARD -A: Performed by: SURGERY

## 2020-01-01 PROCEDURE — 86902 BLOOD TYPE ANTIGEN DONOR EA: CPT

## 2020-01-01 PROCEDURE — 97163 PT EVAL HIGH COMPLEX 45 MIN: CPT

## 2020-01-01 PROCEDURE — 74011000258 HC RX REV CODE- 258: Performed by: HOSPITALIST

## 2020-01-01 PROCEDURE — 82272 OCCULT BLD FECES 1-3 TESTS: CPT

## 2020-01-01 PROCEDURE — 74011000258 HC RX REV CODE- 258: Performed by: NURSE ANESTHETIST, CERTIFIED REGISTERED

## 2020-01-01 PROCEDURE — A6199 ALGINATE DRSG WOUND FILLER: HCPCS

## 2020-01-01 PROCEDURE — 77030041247 HC PROTECTOR HEEL HEELMEDIX MDII -B

## 2020-01-01 PROCEDURE — 76010000149 HC OR TIME 1 TO 1.5 HR: Performed by: SURGERY

## 2020-01-01 PROCEDURE — 74011636320 HC RX REV CODE- 636/320: Performed by: EMERGENCY MEDICINE

## 2020-01-01 PROCEDURE — 0DTM0ZZ RESECTION OF DESCENDING COLON, OPEN APPROACH: ICD-10-PCS | Performed by: SURGERY

## 2020-01-01 PROCEDURE — 77010033678 HC OXYGEN DAILY: Performed by: HOSPITALIST

## 2020-01-01 PROCEDURE — 77030012406 HC DRN WND PENRS BARD -A: Performed by: SURGERY

## 2020-01-01 PROCEDURE — 0DTN0ZZ RESECTION OF SIGMOID COLON, OPEN APPROACH: ICD-10-PCS | Performed by: SURGERY

## 2020-01-01 PROCEDURE — 74011000258 HC RX REV CODE- 258: Performed by: EMERGENCY MEDICINE

## 2020-01-01 PROCEDURE — 74011636320 HC RX REV CODE- 636/320: Performed by: INTERNAL MEDICINE

## 2020-01-01 PROCEDURE — 74230 X-RAY XM SWLNG FUNCJ C+: CPT

## 2020-01-01 PROCEDURE — P9045 ALBUMIN (HUMAN), 5%, 250 ML: HCPCS | Performed by: NURSE ANESTHETIST, CERTIFIED REGISTERED

## 2020-01-01 PROCEDURE — 71275 CT ANGIOGRAPHY CHEST: CPT

## 2020-01-01 PROCEDURE — 77030018673: Performed by: SURGERY

## 2020-01-01 PROCEDURE — 0DBP0ZZ EXCISION OF RECTUM, OPEN APPROACH: ICD-10-PCS | Performed by: SURGERY

## 2020-01-01 PROCEDURE — 77030010538 HC BG FLEXSEAL FMS BMS -A

## 2020-01-01 PROCEDURE — 74011250636 HC RX REV CODE- 250/636: Performed by: ANESTHESIOLOGY

## 2020-01-01 PROCEDURE — 97112 NEUROMUSCULAR REEDUCATION: CPT

## 2020-01-01 PROCEDURE — 77030041174 HC STOOL COL SYS DIGNSHLD BARD -C

## 2020-01-01 PROCEDURE — 84439 ASSAY OF FREE THYROXINE: CPT

## 2020-01-01 PROCEDURE — 81003 URINALYSIS AUTO W/O SCOPE: CPT

## 2020-01-01 PROCEDURE — 97168 OT RE-EVAL EST PLAN CARE: CPT

## 2020-01-01 PROCEDURE — 77030019934 HC DRSG VAC ASST KCON -B

## 2020-01-01 PROCEDURE — 93308 TTE F-UP OR LMTD: CPT

## 2020-01-01 PROCEDURE — A4649 SURGICAL SUPPLIES: HCPCS

## 2020-01-01 PROCEDURE — 90686 IIV4 VACC NO PRSV 0.5 ML IM: CPT | Performed by: INTERNAL MEDICINE

## 2020-01-01 PROCEDURE — 93321 DOPPLER ECHO F-UP/LMTD STD: CPT

## 2020-01-01 PROCEDURE — 83010 ASSAY OF HAPTOGLOBIN QUANT: CPT

## 2020-01-01 PROCEDURE — A6260 WOUND CLEANSER ANY TYPE/SIZE: HCPCS

## 2020-01-01 PROCEDURE — 87185 SC STD ENZYME DETCJ PER NZM: CPT

## 2020-01-01 PROCEDURE — 77030013079 HC BLNKT BAIR HGGR 3M -A: Performed by: ANESTHESIOLOGY

## 2020-01-01 RX ORDER — ALBUMIN HUMAN 50 G/1000ML
25 SOLUTION INTRAVENOUS ONCE
Status: COMPLETED | OUTPATIENT
Start: 2020-01-01 | End: 2020-01-01

## 2020-01-01 RX ORDER — SODIUM CHLORIDE, SODIUM LACTATE, POTASSIUM CHLORIDE, CALCIUM CHLORIDE 600; 310; 30; 20 MG/100ML; MG/100ML; MG/100ML; MG/100ML
INJECTION, SOLUTION INTRAVENOUS
Status: DISCONTINUED | OUTPATIENT
Start: 2020-01-01 | End: 2020-01-01 | Stop reason: HOSPADM

## 2020-01-01 RX ORDER — LANOLIN ALCOHOL/MO/W.PET/CERES
400 CREAM (GRAM) TOPICAL DAILY
Qty: 30 TAB | Refills: 0 | Status: SHIPPED | OUTPATIENT
Start: 2020-01-01 | End: 2020-01-01 | Stop reason: SDUPTHER

## 2020-01-01 RX ORDER — POTASSIUM CHLORIDE 7.45 MG/ML
10 INJECTION INTRAVENOUS
Status: COMPLETED | OUTPATIENT
Start: 2020-01-01 | End: 2020-01-01

## 2020-01-01 RX ORDER — CEFPODOXIME PROXETIL 200 MG/1
200 TABLET, FILM COATED ORAL EVERY 12 HOURS
Qty: 6 TAB | Refills: 0 | Status: SHIPPED | OUTPATIENT
Start: 2020-01-01 | End: 2020-01-01

## 2020-01-01 RX ORDER — ACETAMINOPHEN 325 MG/1
650 TABLET ORAL
Status: DISCONTINUED | OUTPATIENT
Start: 2020-01-01 | End: 2020-01-01 | Stop reason: HOSPADM

## 2020-01-01 RX ORDER — PREDNISONE 20 MG/1
20 TABLET ORAL
Status: DISCONTINUED | OUTPATIENT
Start: 2020-01-01 | End: 2020-01-01

## 2020-01-01 RX ORDER — MAGNESIUM SULFATE 1 G/100ML
1 INJECTION INTRAVENOUS ONCE
Status: COMPLETED | OUTPATIENT
Start: 2020-01-01 | End: 2020-01-01

## 2020-01-01 RX ORDER — TRAMADOL HYDROCHLORIDE 50 MG/1
1 TABLET ORAL
COMMUNITY
End: 2020-01-01

## 2020-01-01 RX ORDER — DOXYCYCLINE 100 MG/1
100 CAPSULE ORAL EVERY 12 HOURS
Qty: 10 CAP | Refills: 0 | Status: SHIPPED | OUTPATIENT
Start: 2020-01-01 | End: 2020-01-01 | Stop reason: SDUPTHER

## 2020-01-01 RX ORDER — CEFPODOXIME PROXETIL 200 MG/1
200 TABLET, FILM COATED ORAL EVERY 12 HOURS
Status: DISCONTINUED | OUTPATIENT
Start: 2020-01-01 | End: 2020-01-01 | Stop reason: HOSPADM

## 2020-01-01 RX ORDER — POTASSIUM CHLORIDE 20 MEQ/1
40 TABLET, EXTENDED RELEASE ORAL
Status: COMPLETED | OUTPATIENT
Start: 2020-01-01 | End: 2020-01-01

## 2020-01-01 RX ORDER — SPIRONOLACTONE 25 MG/1
25 TABLET ORAL DAILY
Qty: 30 TAB | Refills: 0 | Status: SHIPPED | OUTPATIENT
Start: 2020-01-01 | End: 2020-01-01 | Stop reason: SDUPTHER

## 2020-01-01 RX ORDER — DEXTROSE 50 % IN WATER (D50W) INTRAVENOUS SYRINGE
25-50 AS NEEDED
Status: DISCONTINUED | OUTPATIENT
Start: 2020-01-01 | End: 2020-01-01

## 2020-01-01 RX ORDER — MAGNESIUM SULFATE HEPTAHYDRATE 500 MG/ML
2 INJECTION, SOLUTION INTRAMUSCULAR; INTRAVENOUS
Status: COMPLETED | OUTPATIENT
Start: 2020-01-01 | End: 2020-01-01

## 2020-01-01 RX ORDER — KETAMINE HYDROCHLORIDE 50 MG/ML
2 INJECTION, SOLUTION INTRAMUSCULAR; INTRAVENOUS ONCE
Status: COMPLETED | OUTPATIENT
Start: 2020-01-01 | End: 2020-01-01

## 2020-01-01 RX ORDER — NITROFURANTOIN 25; 75 MG/1; MG/1
100 CAPSULE ORAL 2 TIMES DAILY
Qty: 9 CAP | Refills: 0 | Status: SHIPPED | OUTPATIENT
Start: 2020-01-01 | End: 2020-01-01

## 2020-01-01 RX ORDER — INSULIN GLARGINE 100 [IU]/ML
10 INJECTION, SOLUTION SUBCUTANEOUS DAILY
Qty: 1 VIAL | Refills: 1 | Status: SHIPPED | OUTPATIENT
Start: 2020-01-01 | End: 2020-01-01 | Stop reason: SDUPTHER

## 2020-01-01 RX ORDER — INSULIN GLARGINE 100 [IU]/ML
40 INJECTION, SOLUTION SUBCUTANEOUS
Status: DISCONTINUED | OUTPATIENT
Start: 2020-01-01 | End: 2020-01-01 | Stop reason: HOSPADM

## 2020-01-01 RX ORDER — TOLTERODINE TARTRATE 1 MG/1
1 TABLET, EXTENDED RELEASE ORAL
COMMUNITY
End: 2020-01-01

## 2020-01-01 RX ORDER — HEPARIN SODIUM 5000 [USP'U]/ML
5000 INJECTION, SOLUTION INTRAVENOUS; SUBCUTANEOUS EVERY 8 HOURS
Status: DISCONTINUED | OUTPATIENT
Start: 2020-01-01 | End: 2020-01-01 | Stop reason: HOSPADM

## 2020-01-01 RX ORDER — VANCOMYCIN 1.75 GRAM/500 ML IN 0.9 % SODIUM CHLORIDE INTRAVENOUS
1750 ONCE
Status: COMPLETED | OUTPATIENT
Start: 2020-01-01 | End: 2020-01-01

## 2020-01-01 RX ORDER — LEVOTHYROXINE SODIUM 100 UG/1
100 TABLET ORAL
Qty: 30 TAB | Refills: 0 | Status: SHIPPED | OUTPATIENT
Start: 2020-01-01 | End: 2020-01-01 | Stop reason: SDUPTHER

## 2020-01-01 RX ORDER — MAGNESIUM SULFATE 1 G/100ML
1 INJECTION INTRAVENOUS
Status: COMPLETED | OUTPATIENT
Start: 2020-01-01 | End: 2020-01-01

## 2020-01-01 RX ORDER — VANCOMYCIN/0.9 % SOD CHLORIDE 1.5G/250ML
1500 PLASTIC BAG, INJECTION (ML) INTRAVENOUS ONCE
Status: COMPLETED | OUTPATIENT
Start: 2020-01-01 | End: 2020-01-01

## 2020-01-01 RX ORDER — HYDROCORTISONE SODIUM SUCCINATE 100 MG/2ML
50 INJECTION, POWDER, FOR SOLUTION INTRAMUSCULAR; INTRAVENOUS DAILY
Status: DISCONTINUED | OUTPATIENT
Start: 2020-01-01 | End: 2020-01-01

## 2020-01-01 RX ORDER — ASPIRIN 300 MG/1
300 SUPPOSITORY RECTAL DAILY
Status: DISCONTINUED | OUTPATIENT
Start: 2020-01-01 | End: 2020-01-01

## 2020-01-01 RX ORDER — METOLAZONE 2.5 MG/1
2.5 TABLET ORAL
COMMUNITY
End: 2020-01-01

## 2020-01-01 RX ORDER — GABAPENTIN 300 MG/1
300 CAPSULE ORAL DAILY
Status: DISCONTINUED | OUTPATIENT
Start: 2020-01-01 | End: 2020-01-01

## 2020-01-01 RX ORDER — SPIRONOLACTONE 25 MG/1
25 TABLET ORAL DAILY
Status: DISCONTINUED | OUTPATIENT
Start: 2020-01-01 | End: 2020-01-01 | Stop reason: HOSPADM

## 2020-01-01 RX ORDER — HEPARIN SODIUM 1000 [USP'U]/ML
4000 INJECTION, SOLUTION INTRAVENOUS; SUBCUTANEOUS ONCE
Status: COMPLETED | OUTPATIENT
Start: 2020-01-01 | End: 2020-01-01

## 2020-01-01 RX ORDER — HYDROCORTISONE SODIUM SUCCINATE 100 MG/2ML
100 INJECTION, POWDER, FOR SOLUTION INTRAMUSCULAR; INTRAVENOUS ONCE
Status: COMPLETED | OUTPATIENT
Start: 2020-01-01 | End: 2020-01-01

## 2020-01-01 RX ORDER — PREDNISONE 20 MG/1
20 TABLET ORAL
Status: DISCONTINUED | OUTPATIENT
Start: 2020-01-01 | End: 2020-01-01 | Stop reason: HOSPADM

## 2020-01-01 RX ORDER — INSULIN GLARGINE 100 [IU]/ML
6 INJECTION, SOLUTION SUBCUTANEOUS ONCE
Status: COMPLETED | OUTPATIENT
Start: 2020-01-01 | End: 2020-01-01

## 2020-01-01 RX ORDER — ASPIRIN 325 MG
325 TABLET ORAL DAILY
Status: DISCONTINUED | OUTPATIENT
Start: 2020-01-01 | End: 2020-01-01

## 2020-01-01 RX ORDER — NALOXONE HYDROCHLORIDE 0.4 MG/ML
1 INJECTION, SOLUTION INTRAMUSCULAR; INTRAVENOUS; SUBCUTANEOUS
Status: DISPENSED | OUTPATIENT
Start: 2020-01-01 | End: 2020-01-01

## 2020-01-01 RX ORDER — WATER FOR INJECTION,STERILE
VIAL (ML) INJECTION
Status: DISPENSED
Start: 2020-01-01 | End: 2020-01-01

## 2020-01-01 RX ORDER — NITROFURANTOIN MACROCRYSTALS 50 MG/1
100 CAPSULE ORAL EVERY 6 HOURS
Status: DISCONTINUED | OUTPATIENT
Start: 2020-01-01 | End: 2020-01-01 | Stop reason: HOSPADM

## 2020-01-01 RX ORDER — LEVOTHYROXINE SODIUM ANHYDROUS 100 UG/5ML
75 INJECTION, POWDER, LYOPHILIZED, FOR SOLUTION INTRAVENOUS DAILY
Status: DISCONTINUED | OUTPATIENT
Start: 2020-01-01 | End: 2020-01-01

## 2020-01-01 RX ORDER — DOXYCYCLINE 100 MG/1
100 CAPSULE ORAL EVERY 12 HOURS
Status: DISCONTINUED | OUTPATIENT
Start: 2020-01-01 | End: 2020-01-01 | Stop reason: HOSPADM

## 2020-01-01 RX ORDER — LORAZEPAM 2 MG/ML
0.5 INJECTION INTRAMUSCULAR
Status: COMPLETED | OUTPATIENT
Start: 2020-01-01 | End: 2020-01-01

## 2020-01-01 RX ORDER — LEVOTHYROXINE SODIUM 100 UG/1
1 TABLET ORAL DAILY
COMMUNITY

## 2020-01-01 RX ORDER — HYDROCORTISONE SODIUM SUCCINATE 100 MG/2ML
50 INJECTION, POWDER, FOR SOLUTION INTRAMUSCULAR; INTRAVENOUS EVERY 6 HOURS
Status: DISCONTINUED | OUTPATIENT
Start: 2020-01-01 | End: 2020-01-01

## 2020-01-01 RX ORDER — INSULIN LISPRO 100 [IU]/ML
INJECTION, SOLUTION INTRAVENOUS; SUBCUTANEOUS
Qty: 1 VIAL | Refills: 0 | Status: SHIPPED | OUTPATIENT
Start: 2020-01-01 | End: 2020-01-01 | Stop reason: SDUPTHER

## 2020-01-01 RX ORDER — SODIUM CHLORIDE 0.9 % (FLUSH) 0.9 %
5-40 SYRINGE (ML) INJECTION EVERY 8 HOURS
Status: DISCONTINUED | OUTPATIENT
Start: 2020-01-01 | End: 2020-01-01 | Stop reason: HOSPADM

## 2020-01-01 RX ORDER — PREDNISONE 10 MG/1
30 TABLET ORAL
Qty: 90 TAB | Refills: 1 | Status: SHIPPED | OUTPATIENT
Start: 2020-01-01 | End: 2020-01-01 | Stop reason: SDUPTHER

## 2020-01-01 RX ORDER — SPIRONOLACTONE 25 MG/1
25 TABLET ORAL DAILY
Qty: 30 TAB | Refills: 1 | Status: SHIPPED | OUTPATIENT
Start: 2020-01-01 | End: 2020-01-01 | Stop reason: SDUPTHER

## 2020-01-01 RX ORDER — SODIUM CHLORIDE 0.9 % (FLUSH) 0.9 %
5-40 SYRINGE (ML) INJECTION EVERY 8 HOURS
Status: DISCONTINUED | OUTPATIENT
Start: 2020-01-01 | End: 2020-01-01

## 2020-01-01 RX ORDER — ACETAMINOPHEN 500 MG
1000 TABLET ORAL
Status: COMPLETED | OUTPATIENT
Start: 2020-01-01 | End: 2020-01-01

## 2020-01-01 RX ORDER — POTASSIUM CHLORIDE 14.9 MG/ML
20 INJECTION INTRAVENOUS
Status: COMPLETED | OUTPATIENT
Start: 2020-01-01 | End: 2020-01-01

## 2020-01-01 RX ORDER — ALBUTEROL SULFATE 0.83 MG/ML
2.5 SOLUTION RESPIRATORY (INHALATION)
Status: COMPLETED | OUTPATIENT
Start: 2020-01-01 | End: 2020-01-01

## 2020-01-01 RX ORDER — TRAZODONE HYDROCHLORIDE 100 MG/1
1 TABLET ORAL
COMMUNITY
End: 2020-01-01

## 2020-01-01 RX ORDER — ROPIVACAINE HYDROCHLORIDE 2 MG/ML
INJECTION, SOLUTION EPIDURAL; INFILTRATION; PERINEURAL
Status: COMPLETED | OUTPATIENT
Start: 2020-01-01 | End: 2020-01-01

## 2020-01-01 RX ORDER — FENTANYL CITRATE 50 UG/ML
50 INJECTION, SOLUTION INTRAMUSCULAR; INTRAVENOUS
Status: DISCONTINUED | OUTPATIENT
Start: 2020-01-01 | End: 2020-01-01

## 2020-01-01 RX ORDER — MAGNESIUM SULFATE HEPTAHYDRATE 40 MG/ML
2 INJECTION, SOLUTION INTRAVENOUS ONCE
Status: COMPLETED | OUTPATIENT
Start: 2020-01-01 | End: 2020-01-01

## 2020-01-01 RX ORDER — BUDESONIDE 0.5 MG/2ML
500 INHALANT ORAL
Status: DISCONTINUED | OUTPATIENT
Start: 2020-01-01 | End: 2020-01-01

## 2020-01-01 RX ORDER — THERA TABS 400 MCG
1 TAB ORAL DAILY
Qty: 30 TAB | Refills: 0 | Status: SHIPPED | OUTPATIENT
Start: 2020-01-01 | End: 2020-01-01 | Stop reason: SDUPTHER

## 2020-01-01 RX ORDER — PREDNISONE 20 MG/1
60 TABLET ORAL
Status: COMPLETED | OUTPATIENT
Start: 2020-01-01 | End: 2020-01-01

## 2020-01-01 RX ORDER — ROSUVASTATIN CALCIUM 20 MG/1
20 TABLET, COATED ORAL
Status: DISCONTINUED | OUTPATIENT
Start: 2020-01-01 | End: 2020-01-01 | Stop reason: HOSPADM

## 2020-01-01 RX ORDER — ARIPIPRAZOLE 10 MG/1
10 TABLET ORAL DAILY
Status: DISCONTINUED | OUTPATIENT
Start: 2020-01-01 | End: 2020-01-01 | Stop reason: HOSPADM

## 2020-01-01 RX ORDER — PROPOFOL 10 MG/ML
0-50 VIAL (ML) INTRAVENOUS
Status: DISCONTINUED | OUTPATIENT
Start: 2020-01-01 | End: 2020-01-01

## 2020-01-01 RX ORDER — BUSPIRONE HYDROCHLORIDE 15 MG/1
15 TABLET ORAL 2 TIMES DAILY
Qty: 60 TAB | Refills: 0 | Status: SHIPPED | OUTPATIENT
Start: 2020-01-01 | End: 2020-01-01 | Stop reason: SDUPTHER

## 2020-01-01 RX ORDER — METHOCARBAMOL 500 MG/1
TABLET, FILM COATED ORAL
COMMUNITY
Start: 2020-01-01 | End: 2020-01-01

## 2020-01-01 RX ORDER — AMLODIPINE BESYLATE 5 MG/1
5 TABLET ORAL DAILY
Qty: 30 TAB | Refills: 0 | Status: SHIPPED | OUTPATIENT
Start: 2020-01-01 | End: 2020-01-01 | Stop reason: SDUPTHER

## 2020-01-01 RX ORDER — DIVALPROEX SODIUM 250 MG/1
250 TABLET, DELAYED RELEASE ORAL
Status: DISCONTINUED | OUTPATIENT
Start: 2020-01-01 | End: 2020-01-01 | Stop reason: HOSPADM

## 2020-01-01 RX ORDER — SODIUM CHLORIDE 0.9 % (FLUSH) 0.9 %
5-10 SYRINGE (ML) INJECTION AS NEEDED
Status: DISCONTINUED | OUTPATIENT
Start: 2020-01-01 | End: 2020-01-01 | Stop reason: HOSPADM

## 2020-01-01 RX ORDER — ARIPIPRAZOLE 5 MG/1
20 TABLET ORAL DAILY
COMMUNITY

## 2020-01-01 RX ORDER — AMIODARONE HYDROCHLORIDE 200 MG/1
200 TABLET ORAL DAILY
Qty: 60 TAB | Refills: 0 | Status: SHIPPED | OUTPATIENT
Start: 2020-01-01 | End: 2020-01-01 | Stop reason: SDUPTHER

## 2020-01-01 RX ORDER — METRONIDAZOLE 500 MG/100ML
500 INJECTION, SOLUTION INTRAVENOUS
Status: COMPLETED | OUTPATIENT
Start: 2020-01-01 | End: 2020-01-01

## 2020-01-01 RX ORDER — MAGNESIUM SULFATE 100 %
4 CRYSTALS MISCELLANEOUS AS NEEDED
Status: DISCONTINUED | OUTPATIENT
Start: 2020-01-01 | End: 2020-01-01 | Stop reason: HOSPADM

## 2020-01-01 RX ORDER — INSULIN GLARGINE 100 [IU]/ML
16 INJECTION, SOLUTION SUBCUTANEOUS DAILY
Status: DISCONTINUED | OUTPATIENT
Start: 2020-01-01 | End: 2020-01-01

## 2020-01-01 RX ORDER — METRONIDAZOLE 500 MG/100ML
500 INJECTION, SOLUTION INTRAVENOUS EVERY 8 HOURS
Status: DISCONTINUED | OUTPATIENT
Start: 2020-01-01 | End: 2020-01-01

## 2020-01-01 RX ORDER — KETAMINE HYDROCHLORIDE 50 MG/ML
INJECTION, SOLUTION INTRAMUSCULAR; INTRAVENOUS
Status: COMPLETED
Start: 2020-01-01 | End: 2020-01-01

## 2020-01-01 RX ORDER — GUAIFENESIN 100 MG/5ML
81 LIQUID (ML) ORAL DAILY
Status: DISCONTINUED | OUTPATIENT
Start: 2020-01-01 | End: 2020-01-01

## 2020-01-01 RX ORDER — METRONIDAZOLE 500 MG/100ML
500 INJECTION, SOLUTION INTRAVENOUS EVERY 12 HOURS
Status: DISCONTINUED | OUTPATIENT
Start: 2020-01-01 | End: 2020-01-01

## 2020-01-01 RX ORDER — GUAIFENESIN 100 MG/5ML
81 LIQUID (ML) ORAL
Status: DISCONTINUED | OUTPATIENT
Start: 2020-01-01 | End: 2020-01-01 | Stop reason: HOSPADM

## 2020-01-01 RX ORDER — BUDESONIDE 0.25 MG/2ML
500 INHALANT ORAL
Status: DISCONTINUED | OUTPATIENT
Start: 2020-01-01 | End: 2020-01-01

## 2020-01-01 RX ORDER — ACETAMINOPHEN 325 MG/1
325 TABLET ORAL
Status: DISCONTINUED | OUTPATIENT
Start: 2020-01-01 | End: 2020-01-01 | Stop reason: HOSPADM

## 2020-01-01 RX ORDER — NYSTATIN 100000 [USP'U]/ML
500000 SUSPENSION ORAL 4 TIMES DAILY
Status: DISCONTINUED | OUTPATIENT
Start: 2020-01-01 | End: 2020-01-01 | Stop reason: HOSPADM

## 2020-01-01 RX ORDER — PREDNISONE 20 MG/1
20 TABLET ORAL ONCE
Status: COMPLETED | OUTPATIENT
Start: 2020-01-01 | End: 2020-01-01

## 2020-01-01 RX ORDER — INSULIN GLARGINE 100 [IU]/ML
0.2 INJECTION, SOLUTION SUBCUTANEOUS
Status: DISCONTINUED | OUTPATIENT
Start: 2020-01-01 | End: 2020-01-01 | Stop reason: HOSPADM

## 2020-01-01 RX ORDER — BUSPIRONE HYDROCHLORIDE 5 MG/1
5 TABLET ORAL 3 TIMES DAILY
Status: DISCONTINUED | OUTPATIENT
Start: 2020-01-01 | End: 2020-01-01 | Stop reason: HOSPADM

## 2020-01-01 RX ORDER — NITROFURANTOIN MACROCRYSTALS 50 MG/1
100 CAPSULE ORAL EVERY 6 HOURS
Status: DISCONTINUED | OUTPATIENT
Start: 2020-01-01 | End: 2020-01-01

## 2020-01-01 RX ORDER — INSULIN LISPRO 100 [IU]/ML
INJECTION, SOLUTION INTRAVENOUS; SUBCUTANEOUS
Status: DISCONTINUED | OUTPATIENT
Start: 2020-01-01 | End: 2020-01-01 | Stop reason: HOSPADM

## 2020-01-01 RX ORDER — NOREPINEPHRINE BITARTRATE/D5W 8 MG/250ML
.5-16 PLASTIC BAG, INJECTION (ML) INTRAVENOUS
Status: DISCONTINUED | OUTPATIENT
Start: 2020-01-01 | End: 2020-01-01

## 2020-01-01 RX ORDER — ALBUMIN HUMAN 250 G/1000ML
12.5 SOLUTION INTRAVENOUS ONCE
Status: COMPLETED | OUTPATIENT
Start: 2020-01-01 | End: 2020-01-01

## 2020-01-01 RX ORDER — INSULIN GLARGINE 100 [IU]/ML
10 INJECTION, SOLUTION SUBCUTANEOUS DAILY
Status: DISCONTINUED | OUTPATIENT
Start: 2020-01-01 | End: 2020-01-01

## 2020-01-01 RX ORDER — CARVEDILOL 6.25 MG/1
6.25 TABLET ORAL EVERY 12 HOURS
Status: DISCONTINUED | OUTPATIENT
Start: 2020-01-01 | End: 2020-01-01 | Stop reason: HOSPADM

## 2020-01-01 RX ORDER — NALOXONE HYDROCHLORIDE 0.4 MG/ML
0.4 INJECTION, SOLUTION INTRAMUSCULAR; INTRAVENOUS; SUBCUTANEOUS AS NEEDED
Status: DISCONTINUED | OUTPATIENT
Start: 2020-01-01 | End: 2020-01-01 | Stop reason: HOSPADM

## 2020-01-01 RX ORDER — AMIODARONE HYDROCHLORIDE 200 MG/1
200 TABLET ORAL 2 TIMES DAILY
Status: DISCONTINUED | OUTPATIENT
Start: 2020-01-01 | End: 2020-01-01 | Stop reason: HOSPADM

## 2020-01-01 RX ORDER — LEVOTHYROXINE SODIUM 100 UG/1
100 TABLET ORAL
Status: DISCONTINUED | OUTPATIENT
Start: 2020-01-01 | End: 2020-01-01

## 2020-01-01 RX ORDER — HEPARIN SODIUM 1000 [USP'U]/ML
3000 INJECTION, SOLUTION INTRAVENOUS; SUBCUTANEOUS ONCE
Status: COMPLETED | OUTPATIENT
Start: 2020-01-01 | End: 2020-01-01

## 2020-01-01 RX ORDER — FAMOTIDINE 20 MG/1
20 TABLET, FILM COATED ORAL
COMMUNITY
End: 2020-01-01

## 2020-01-01 RX ORDER — MAGNESIUM SULFATE HEPTAHYDRATE 500 MG/ML
2 INJECTION, SOLUTION INTRAMUSCULAR; INTRAVENOUS
Status: DISCONTINUED | OUTPATIENT
Start: 2020-01-01 | End: 2020-01-01 | Stop reason: CLARIF

## 2020-01-01 RX ORDER — POTASSIUM CHLORIDE 7.45 MG/ML
INJECTION INTRAVENOUS
Status: DISCONTINUED
Start: 2020-01-01 | End: 2020-01-01

## 2020-01-01 RX ORDER — SERTRALINE HYDROCHLORIDE 50 MG/1
50 TABLET, FILM COATED ORAL DAILY
Status: DISCONTINUED | OUTPATIENT
Start: 2020-01-01 | End: 2020-01-01 | Stop reason: HOSPADM

## 2020-01-01 RX ORDER — IPRATROPIUM BROMIDE AND ALBUTEROL SULFATE 2.5; .5 MG/3ML; MG/3ML
3 SOLUTION RESPIRATORY (INHALATION)
Status: DISCONTINUED | OUTPATIENT
Start: 2020-01-01 | End: 2020-01-01

## 2020-01-01 RX ORDER — THERA TABS 400 MCG
1 TAB ORAL DAILY
Status: DISCONTINUED | OUTPATIENT
Start: 2020-01-01 | End: 2020-01-01 | Stop reason: HOSPADM

## 2020-01-01 RX ORDER — IPRATROPIUM BROMIDE AND ALBUTEROL SULFATE 2.5; .5 MG/3ML; MG/3ML
3 SOLUTION RESPIRATORY (INHALATION) ONCE
Status: COMPLETED | OUTPATIENT
Start: 2020-01-01 | End: 2020-01-01

## 2020-01-01 RX ORDER — ALBUTEROL SULFATE 0.83 MG/ML
2.5 SOLUTION RESPIRATORY (INHALATION)
Qty: 75 NEBULE | Refills: 0 | COMMUNITY
Start: 2020-01-01

## 2020-01-01 RX ORDER — FUROSEMIDE 20 MG/1
20 TABLET ORAL DAILY
Status: DISCONTINUED | OUTPATIENT
Start: 2020-01-01 | End: 2020-01-01 | Stop reason: HOSPADM

## 2020-01-01 RX ORDER — HEPARIN SODIUM 1000 [USP'U]/ML
INJECTION, SOLUTION INTRAVENOUS; SUBCUTANEOUS
Status: COMPLETED
Start: 2020-01-01 | End: 2020-01-01

## 2020-01-01 RX ORDER — ALBUMIN HUMAN 250 G/1000ML
SOLUTION INTRAVENOUS AS NEEDED
Status: DISCONTINUED | OUTPATIENT
Start: 2020-01-01 | End: 2020-01-01 | Stop reason: HOSPADM

## 2020-01-01 RX ORDER — ALBUMIN HUMAN 250 G/1000ML
SOLUTION INTRAVENOUS
Status: COMPLETED
Start: 2020-01-01 | End: 2020-01-01

## 2020-01-01 RX ORDER — LEVOTHYROXINE SODIUM 100 UG/1
100 TABLET ORAL
Qty: 30 TAB | Refills: 1 | Status: SHIPPED | OUTPATIENT
Start: 2020-01-01 | End: 2020-01-01 | Stop reason: SDUPTHER

## 2020-01-01 RX ORDER — LEVOTHYROXINE SODIUM 100 UG/1
100 TABLET ORAL
Status: DISCONTINUED | OUTPATIENT
Start: 2020-01-01 | End: 2020-01-01 | Stop reason: HOSPADM

## 2020-01-01 RX ORDER — DIPHENHYDRAMINE HYDROCHLORIDE 50 MG/ML
25 INJECTION, SOLUTION INTRAMUSCULAR; INTRAVENOUS
Status: DISCONTINUED | OUTPATIENT
Start: 2020-01-01 | End: 2020-01-01 | Stop reason: HOSPADM

## 2020-01-01 RX ORDER — POTASSIUM CHLORIDE 14.9 MG/ML
20 INJECTION INTRAVENOUS ONCE
Status: COMPLETED | OUTPATIENT
Start: 2020-01-01 | End: 2020-01-01

## 2020-01-01 RX ORDER — FUROSEMIDE 20 MG/1
20 TABLET ORAL DAILY
Qty: 30 TAB | Refills: 0 | Status: SHIPPED | OUTPATIENT
Start: 2020-01-01 | End: 2020-01-01 | Stop reason: SDUPTHER

## 2020-01-01 RX ORDER — SODIUM CHLORIDE 0.9 % (FLUSH) 0.9 %
5-10 SYRINGE (ML) INJECTION AS NEEDED
Status: DISCONTINUED | OUTPATIENT
Start: 2020-01-01 | End: 2020-01-01 | Stop reason: SDUPTHER

## 2020-01-01 RX ORDER — MIDAZOLAM HYDROCHLORIDE 1 MG/ML
4 INJECTION, SOLUTION INTRAMUSCULAR; INTRAVENOUS ONCE
Status: COMPLETED | OUTPATIENT
Start: 2020-01-01 | End: 2020-01-01

## 2020-01-01 RX ORDER — METHADONE HYDROCHLORIDE 10 MG/ML
10 CONCENTRATE ORAL DAILY
Status: DISCONTINUED | OUTPATIENT
Start: 2020-01-01 | End: 2020-01-01 | Stop reason: HOSPADM

## 2020-01-01 RX ORDER — DIPHENHYDRAMINE HYDROCHLORIDE 50 MG/ML
25 INJECTION, SOLUTION INTRAMUSCULAR; INTRAVENOUS ONCE
Status: ACTIVE | OUTPATIENT
Start: 2020-01-01 | End: 2020-01-01

## 2020-01-01 RX ORDER — PREDNISONE 20 MG/1
20 TABLET ORAL
Qty: 30 TAB | Refills: 0 | Status: SHIPPED | OUTPATIENT
Start: 2020-01-01 | End: 2020-01-01

## 2020-01-01 RX ORDER — ROSUVASTATIN CALCIUM 10 MG/1
5 TABLET, COATED ORAL
Status: DISCONTINUED | OUTPATIENT
Start: 2020-01-01 | End: 2020-01-01 | Stop reason: HOSPADM

## 2020-01-01 RX ORDER — ALBUMIN HUMAN 50 G/1000ML
SOLUTION INTRAVENOUS AS NEEDED
Status: DISCONTINUED | OUTPATIENT
Start: 2020-01-01 | End: 2020-01-01 | Stop reason: HOSPADM

## 2020-01-01 RX ORDER — POTASSIUM CHLORIDE 29.8 MG/ML
20 INJECTION INTRAVENOUS
Status: COMPLETED | OUTPATIENT
Start: 2020-01-01 | End: 2020-01-01

## 2020-01-01 RX ORDER — LANOLIN ALCOHOL/MO/W.PET/CERES
325 CREAM (GRAM) TOPICAL
Status: DISCONTINUED | OUTPATIENT
Start: 2020-01-01 | End: 2020-01-01 | Stop reason: HOSPADM

## 2020-01-01 RX ORDER — ALBUMIN HUMAN 50 G/1000ML
SOLUTION INTRAVENOUS
Status: COMPLETED
Start: 2020-01-01 | End: 2020-01-01

## 2020-01-01 RX ORDER — FACIAL-BODY WIPES
10 EACH TOPICAL DAILY PRN
Status: DISCONTINUED | OUTPATIENT
Start: 2020-01-01 | End: 2020-01-01 | Stop reason: HOSPADM

## 2020-01-01 RX ORDER — FAMOTIDINE 20 MG/1
20 TABLET, FILM COATED ORAL 2 TIMES DAILY
Status: DISCONTINUED | OUTPATIENT
Start: 2020-01-01 | End: 2020-01-01 | Stop reason: HOSPADM

## 2020-01-01 RX ORDER — DOCUSATE SODIUM 100 MG/1
100 CAPSULE, LIQUID FILLED ORAL
Status: ON HOLD | COMMUNITY
End: 2020-01-01 | Stop reason: SDUPTHER

## 2020-01-01 RX ORDER — PANTOPRAZOLE SODIUM 40 MG/1
40 TABLET, DELAYED RELEASE ORAL
Qty: 60 TAB | Refills: 0 | Status: SHIPPED | OUTPATIENT
Start: 2020-01-01 | End: 2020-01-01 | Stop reason: SDUPTHER

## 2020-01-01 RX ORDER — METOCLOPRAMIDE 5 MG/1
10 TABLET ORAL
Status: DISCONTINUED | OUTPATIENT
Start: 2020-01-01 | End: 2020-01-01

## 2020-01-01 RX ORDER — LANOLIN ALCOHOL/MO/W.PET/CERES
400 CREAM (GRAM) TOPICAL DAILY
Status: DISCONTINUED | OUTPATIENT
Start: 2020-01-01 | End: 2020-01-01 | Stop reason: HOSPADM

## 2020-01-01 RX ORDER — SODIUM CHLORIDE 0.9 % (FLUSH) 0.9 %
5-40 SYRINGE (ML) INJECTION AS NEEDED
Status: DISCONTINUED | OUTPATIENT
Start: 2020-01-01 | End: 2020-01-01 | Stop reason: HOSPADM

## 2020-01-01 RX ORDER — POTASSIUM CHLORIDE 14.9 MG/ML
10 INJECTION INTRAVENOUS
Status: CANCELLED | OUTPATIENT
Start: 2020-01-01 | End: 2020-01-01

## 2020-01-01 RX ORDER — HYDROCORTISONE SODIUM SUCCINATE 100 MG/2ML
50 INJECTION, POWDER, FOR SOLUTION INTRAMUSCULAR; INTRAVENOUS EVERY 12 HOURS
Status: DISCONTINUED | OUTPATIENT
Start: 2020-01-01 | End: 2020-01-01

## 2020-01-01 RX ORDER — IPRATROPIUM BROMIDE AND ALBUTEROL SULFATE 2.5; .5 MG/3ML; MG/3ML
3 SOLUTION RESPIRATORY (INHALATION)
Qty: 30 NEBULE | Refills: 1 | Status: SHIPPED | OUTPATIENT
Start: 2020-01-01 | End: 2020-01-01 | Stop reason: SDUPTHER

## 2020-01-01 RX ORDER — POTASSIUM CHLORIDE 20 MEQ/1
40 TABLET, EXTENDED RELEASE ORAL 3 TIMES DAILY
Status: COMPLETED | OUTPATIENT
Start: 2020-01-01 | End: 2020-01-01

## 2020-01-01 RX ORDER — NALOXONE HYDROCHLORIDE 1 MG/ML
INJECTION INTRAMUSCULAR; INTRAVENOUS; SUBCUTANEOUS
Status: COMPLETED
Start: 2020-01-01 | End: 2020-01-01

## 2020-01-01 RX ORDER — METHOCARBAMOL 500 MG/1
500 TABLET, FILM COATED ORAL EVERY 8 HOURS
Qty: 21 TAB | Refills: 0 | Status: SHIPPED | OUTPATIENT
Start: 2020-01-01 | End: 2020-01-01

## 2020-01-01 RX ORDER — BUDESONIDE 0.5 MG/2ML
500 INHALANT ORAL
Status: DISCONTINUED | OUTPATIENT
Start: 2020-01-01 | End: 2020-01-01 | Stop reason: HOSPADM

## 2020-01-01 RX ORDER — ACETAZOLAMIDE 250 MG/1
250 TABLET ORAL DAILY
Status: DISCONTINUED | OUTPATIENT
Start: 2020-01-01 | End: 2020-01-01

## 2020-01-01 RX ORDER — PHENYLEPHRINE HCL IN 0.9% NACL 1 MG/10 ML
SYRINGE (ML) INTRAVENOUS AS NEEDED
Status: DISCONTINUED | OUTPATIENT
Start: 2020-01-01 | End: 2020-01-01 | Stop reason: HOSPADM

## 2020-01-01 RX ORDER — PREDNISONE 10 MG/1
30 TABLET ORAL
Qty: 30 TAB | Refills: 0 | Status: SHIPPED | OUTPATIENT
Start: 2020-01-01 | End: 2020-01-01 | Stop reason: SDUPTHER

## 2020-01-01 RX ORDER — ASCORBIC ACID 250 MG
250 TABLET ORAL
Qty: 30 TAB | Refills: 0 | Status: SHIPPED | OUTPATIENT
Start: 2020-01-01

## 2020-01-01 RX ORDER — FENTANYL CITRATE 50 UG/ML
100 INJECTION, SOLUTION INTRAMUSCULAR; INTRAVENOUS
Status: COMPLETED | OUTPATIENT
Start: 2020-01-01 | End: 2020-01-01

## 2020-01-01 RX ORDER — NALOXONE HYDROCHLORIDE 0.4 MG/ML
2 INJECTION, SOLUTION INTRAMUSCULAR; INTRAVENOUS; SUBCUTANEOUS
Status: ACTIVE | OUTPATIENT
Start: 2020-01-01 | End: 2020-01-01

## 2020-01-01 RX ORDER — FENTANYL CITRATE 50 UG/ML
50-100 INJECTION, SOLUTION INTRAMUSCULAR; INTRAVENOUS
Status: DISCONTINUED | OUTPATIENT
Start: 2020-01-01 | End: 2020-01-01

## 2020-01-01 RX ORDER — DEXTROSE 50 % IN WATER (D50W) INTRAVENOUS SYRINGE
25-50 AS NEEDED
Status: DISCONTINUED | OUTPATIENT
Start: 2020-01-01 | End: 2020-01-01 | Stop reason: CLARIF

## 2020-01-01 RX ORDER — FENTANYL CITRATE 50 UG/ML
50 INJECTION, SOLUTION INTRAMUSCULAR; INTRAVENOUS
Status: ACTIVE | OUTPATIENT
Start: 2020-01-01 | End: 2020-01-01

## 2020-01-01 RX ORDER — PREDNISONE 10 MG/1
TABLET ORAL
Qty: 60 TAB | Refills: 0 | Status: SHIPPED | OUTPATIENT
Start: 2020-01-01 | End: 2020-01-01

## 2020-01-01 RX ORDER — MIDAZOLAM HYDROCHLORIDE 1 MG/ML
2 INJECTION, SOLUTION INTRAMUSCULAR; INTRAVENOUS
Status: COMPLETED | OUTPATIENT
Start: 2020-01-01 | End: 2020-01-01

## 2020-01-01 RX ORDER — IPRATROPIUM BROMIDE AND ALBUTEROL SULFATE 2.5; .5 MG/3ML; MG/3ML
3 SOLUTION RESPIRATORY (INHALATION)
Status: DISCONTINUED | OUTPATIENT
Start: 2020-01-01 | End: 2020-01-01 | Stop reason: HOSPADM

## 2020-01-01 RX ORDER — SODIUM BICARBONATE 1 MEQ/ML
100 SYRINGE (ML) INTRAVENOUS ONCE
Status: COMPLETED | OUTPATIENT
Start: 2020-01-01 | End: 2020-01-01

## 2020-01-01 RX ORDER — GABAPENTIN 300 MG/1
300 CAPSULE ORAL DAILY
Status: ON HOLD | COMMUNITY
End: 2020-01-01

## 2020-01-01 RX ORDER — AMLODIPINE BESYLATE 5 MG/1
5 TABLET ORAL DAILY
Status: DISCONTINUED | OUTPATIENT
Start: 2020-01-01 | End: 2020-01-01 | Stop reason: HOSPADM

## 2020-01-01 RX ORDER — AMIODARONE HYDROCHLORIDE 200 MG/1
200 TABLET ORAL DAILY
Status: DISCONTINUED | OUTPATIENT
Start: 2020-01-01 | End: 2020-01-01 | Stop reason: HOSPADM

## 2020-01-01 RX ORDER — METHADONE HYDROCHLORIDE 10 MG/ML
20 CONCENTRATE ORAL DAILY
Status: DISCONTINUED | OUTPATIENT
Start: 2020-01-01 | End: 2020-01-01

## 2020-01-01 RX ORDER — HEPARIN SODIUM 10000 [USP'U]/100ML
12-25 INJECTION, SOLUTION INTRAVENOUS
Status: DISCONTINUED | OUTPATIENT
Start: 2020-01-01 | End: 2020-01-01

## 2020-01-01 RX ORDER — GUAIFENESIN 100 MG/5ML
81 LIQUID (ML) ORAL
Qty: 30 TAB | Refills: 0 | Status: SHIPPED | OUTPATIENT
Start: 2020-01-01

## 2020-01-01 RX ORDER — ROCURONIUM BROMIDE 10 MG/ML
0.6 INJECTION, SOLUTION INTRAVENOUS
Status: COMPLETED | OUTPATIENT
Start: 2020-01-01 | End: 2020-01-01

## 2020-01-01 RX ORDER — POTASSIUM CHLORIDE 29.8 MG/ML
20 INJECTION INTRAVENOUS ONCE
Status: COMPLETED | OUTPATIENT
Start: 2020-01-01 | End: 2020-01-01

## 2020-01-01 RX ORDER — SODIUM CHLORIDE FOR INHALATION 3 %
4 VIAL, NEBULIZER (ML) INHALATION
Status: DISCONTINUED | OUTPATIENT
Start: 2020-01-01 | End: 2020-01-01

## 2020-01-01 RX ORDER — AMIODARONE HYDROCHLORIDE 200 MG/1
200 TABLET ORAL DAILY
Qty: 60 TAB | Refills: 1 | Status: SHIPPED | OUTPATIENT
Start: 2020-01-01 | End: 2020-01-01 | Stop reason: SDUPTHER

## 2020-01-01 RX ORDER — FAMOTIDINE 20 MG/1
20 TABLET, FILM COATED ORAL
Status: DISCONTINUED | OUTPATIENT
Start: 2020-01-01 | End: 2020-01-01

## 2020-01-01 RX ORDER — ALBUTEROL SULFATE 90 UG/1
1 AEROSOL, METERED RESPIRATORY (INHALATION)
Status: DISCONTINUED | OUTPATIENT
Start: 2020-01-01 | End: 2020-01-01

## 2020-01-01 RX ORDER — METHADONE HYDROCHLORIDE 5 MG/5ML
10 SOLUTION ORAL DAILY
Status: DISCONTINUED | OUTPATIENT
Start: 2020-01-01 | End: 2020-01-01 | Stop reason: SDUPTHER

## 2020-01-01 RX ORDER — FUROSEMIDE 20 MG/1
20 TABLET ORAL DAILY
Qty: 90 TAB | Refills: 1 | Status: SHIPPED | OUTPATIENT
Start: 2020-01-01

## 2020-01-01 RX ORDER — HYDRALAZINE HYDROCHLORIDE 20 MG/ML
10 INJECTION INTRAMUSCULAR; INTRAVENOUS
Status: DISCONTINUED | OUTPATIENT
Start: 2020-01-01 | End: 2020-01-01 | Stop reason: HOSPADM

## 2020-01-01 RX ORDER — ACETAMINOPHEN 325 MG/1
650 TABLET ORAL
Qty: 60 TAB | Refills: 0 | Status: SHIPPED | OUTPATIENT
Start: 2020-01-01

## 2020-01-01 RX ORDER — PREDNISONE 20 MG/1
40 TABLET ORAL
Status: DISCONTINUED | OUTPATIENT
Start: 2020-01-01 | End: 2020-01-01

## 2020-01-01 RX ORDER — CALCIUM CARBONATE 200(500)MG
200 TABLET,CHEWABLE ORAL
Status: DISCONTINUED | OUTPATIENT
Start: 2020-01-01 | End: 2020-01-01 | Stop reason: HOSPADM

## 2020-01-01 RX ORDER — IPRATROPIUM BROMIDE 0.5 MG/2.5ML
SOLUTION RESPIRATORY (INHALATION)
COMMUNITY
Start: 2020-01-01 | End: 2020-01-01 | Stop reason: SDUPTHER

## 2020-01-01 RX ORDER — DEXTROSE MONOHYDRATE 100 MG/ML
125-250 INJECTION, SOLUTION INTRAVENOUS AS NEEDED
Status: DISCONTINUED | OUTPATIENT
Start: 2020-01-01 | End: 2020-01-01 | Stop reason: HOSPADM

## 2020-01-01 RX ORDER — CLINDAMYCIN HYDROCHLORIDE 150 MG/1
CAPSULE ORAL
COMMUNITY
End: 2020-01-01

## 2020-01-01 RX ORDER — IBUPROFEN 200 MG
1 TABLET ORAL DAILY
Status: DISCONTINUED | OUTPATIENT
Start: 2020-01-01 | End: 2020-01-01 | Stop reason: HOSPADM

## 2020-01-01 RX ORDER — ALBUTEROL SULFATE 90 UG/1
2 AEROSOL, METERED RESPIRATORY (INHALATION)
Status: DISCONTINUED | OUTPATIENT
Start: 2020-01-01 | End: 2020-01-01

## 2020-01-01 RX ORDER — ARIPIPRAZOLE 10 MG/1
1 TABLET ORAL
COMMUNITY
End: 2020-01-01

## 2020-01-01 RX ORDER — UMECLIDINIUM BROMIDE AND VILANTEROL TRIFENATATE 62.5; 25 UG/1; UG/1
POWDER RESPIRATORY (INHALATION)
Qty: 1 INHALER | Refills: 5 | Status: SHIPPED | OUTPATIENT
Start: 2020-01-01

## 2020-01-01 RX ORDER — MELATONIN
1000 DAILY
COMMUNITY

## 2020-01-01 RX ORDER — LANOLIN ALCOHOL/MO/W.PET/CERES
400 CREAM (GRAM) TOPICAL DAILY
Qty: 30 TAB | Refills: 1 | Status: SHIPPED | OUTPATIENT
Start: 2020-01-01

## 2020-01-01 RX ORDER — PANTOPRAZOLE SODIUM 40 MG/1
40 TABLET, DELAYED RELEASE ORAL
Status: DISCONTINUED | OUTPATIENT
Start: 2020-01-01 | End: 2020-01-01 | Stop reason: HOSPADM

## 2020-01-01 RX ORDER — ENOXAPARIN SODIUM 100 MG/ML
70 INJECTION SUBCUTANEOUS EVERY 12 HOURS
Status: DISCONTINUED | OUTPATIENT
Start: 2020-01-01 | End: 2020-01-01 | Stop reason: HOSPADM

## 2020-01-01 RX ORDER — LEVOFLOXACIN 5 MG/ML
750 INJECTION, SOLUTION INTRAVENOUS EVERY 24 HOURS
Status: DISCONTINUED | OUTPATIENT
Start: 2020-01-01 | End: 2020-01-01

## 2020-01-01 RX ORDER — FUROSEMIDE 20 MG/1
20 TABLET ORAL DAILY
Qty: 30 TAB | Refills: 1 | Status: SHIPPED | OUTPATIENT
Start: 2020-01-01 | End: 2020-01-01 | Stop reason: SDUPTHER

## 2020-01-01 RX ORDER — AMLODIPINE BESYLATE 2.5 MG/1
2.5 TABLET ORAL
Status: COMPLETED | OUTPATIENT
Start: 2020-01-01 | End: 2020-01-01

## 2020-01-01 RX ORDER — DILTIAZEM HYDROCHLORIDE 30 MG/1
30 TABLET, FILM COATED ORAL
Status: DISCONTINUED | OUTPATIENT
Start: 2020-01-01 | End: 2020-01-01

## 2020-01-01 RX ORDER — BUSPIRONE HYDROCHLORIDE 10 MG/1
10 TABLET ORAL 3 TIMES DAILY
COMMUNITY
End: 2020-01-01

## 2020-01-01 RX ORDER — PANTOPRAZOLE SODIUM 40 MG/1
40 TABLET, DELAYED RELEASE ORAL
Qty: 60 TAB | Refills: 0 | Status: SHIPPED | OUTPATIENT
Start: 2020-01-01

## 2020-01-01 RX ORDER — ASCORBIC ACID 250 MG
250 TABLET ORAL
Status: DISCONTINUED | OUTPATIENT
Start: 2020-01-01 | End: 2020-01-01 | Stop reason: HOSPADM

## 2020-01-01 RX ORDER — FUROSEMIDE 10 MG/ML
20 INJECTION INTRAMUSCULAR; INTRAVENOUS 2 TIMES DAILY
Status: COMPLETED | OUTPATIENT
Start: 2020-01-01 | End: 2020-01-01

## 2020-01-01 RX ORDER — FUROSEMIDE 10 MG/ML
20 INJECTION INTRAMUSCULAR; INTRAVENOUS ONCE
Status: COMPLETED | OUTPATIENT
Start: 2020-01-01 | End: 2020-01-01

## 2020-01-01 RX ORDER — NOREPINEPHRINE BITARTRATE/D5W 8 MG/250ML
.5-3 PLASTIC BAG, INJECTION (ML) INTRAVENOUS
Status: DISCONTINUED | OUTPATIENT
Start: 2020-01-01 | End: 2020-01-01

## 2020-01-01 RX ORDER — VANCOMYCIN 1.75 GRAM/500 ML IN 0.9 % SODIUM CHLORIDE INTRAVENOUS
1750 ONCE
Status: DISCONTINUED | OUTPATIENT
Start: 2020-01-01 | End: 2020-01-01

## 2020-01-01 RX ORDER — ARIPIPRAZOLE 10 MG/1
10 TABLET ORAL DAILY
Qty: 30 TAB | Refills: 0 | Status: SHIPPED | OUTPATIENT
Start: 2020-01-01 | End: 2020-01-01 | Stop reason: SDUPTHER

## 2020-01-01 RX ORDER — FUROSEMIDE 10 MG/ML
20 INJECTION INTRAMUSCULAR; INTRAVENOUS 2 TIMES DAILY
Status: DISCONTINUED | OUTPATIENT
Start: 2020-01-01 | End: 2020-01-01

## 2020-01-01 RX ORDER — METHADONE HYDROCHLORIDE 10 MG/ML
20 CONCENTRATE ORAL
Status: COMPLETED | OUTPATIENT
Start: 2020-01-01 | End: 2020-01-01

## 2020-01-01 RX ORDER — PREDNISONE 10 MG/1
30 TABLET ORAL
Qty: 30 TAB | Refills: 1 | Status: SHIPPED | OUTPATIENT
Start: 2020-01-01 | End: 2020-01-01 | Stop reason: SDUPTHER

## 2020-01-01 RX ORDER — THERA TABS 400 MCG
1 TAB ORAL DAILY
Status: DISCONTINUED | OUTPATIENT
Start: 2020-01-01 | End: 2020-01-01

## 2020-01-01 RX ORDER — METHADONE HYDROCHLORIDE 10 MG/ML
25 CONCENTRATE ORAL DAILY
COMMUNITY
End: 2020-01-01

## 2020-01-01 RX ORDER — LIDOCAINE HYDROCHLORIDE 10 MG/ML
INJECTION, SOLUTION EPIDURAL; INFILTRATION; INTRACAUDAL; PERINEURAL
Status: DISCONTINUED
Start: 2020-01-01 | End: 2020-01-01 | Stop reason: HOSPADM

## 2020-01-01 RX ORDER — SPIRONOLACTONE 25 MG/1
25 TABLET ORAL DAILY
Qty: 30 TAB | Refills: 3 | Status: SHIPPED | OUTPATIENT
Start: 2020-01-01

## 2020-01-01 RX ORDER — MAGNESIUM SULFATE HEPTAHYDRATE 40 MG/ML
2 INJECTION, SOLUTION INTRAVENOUS
Status: COMPLETED | OUTPATIENT
Start: 2020-01-01 | End: 2020-01-01

## 2020-01-01 RX ORDER — NALOXONE HYDROCHLORIDE 0.4 MG/ML
0.4 INJECTION, SOLUTION INTRAMUSCULAR; INTRAVENOUS; SUBCUTANEOUS
Status: DISCONTINUED | OUTPATIENT
Start: 2020-01-01 | End: 2020-01-01 | Stop reason: HOSPADM

## 2020-01-01 RX ORDER — ALBUTEROL SULFATE 0.83 MG/ML
5 SOLUTION RESPIRATORY (INHALATION)
Status: COMPLETED | OUTPATIENT
Start: 2020-01-01 | End: 2020-01-01

## 2020-01-01 RX ORDER — EPINEPHRINE 1 MG/ML
INJECTION, SOLUTION, CONCENTRATE INTRAVENOUS AS NEEDED
Status: DISCONTINUED | OUTPATIENT
Start: 2020-01-01 | End: 2020-01-01 | Stop reason: HOSPADM

## 2020-01-01 RX ORDER — INSULIN GLARGINE 100 [IU]/ML
20 INJECTION, SOLUTION SUBCUTANEOUS
COMMUNITY
End: 2020-01-01

## 2020-01-01 RX ORDER — POTASSIUM CHLORIDE 7.45 MG/ML
10 INJECTION INTRAVENOUS
Status: DISCONTINUED | OUTPATIENT
Start: 2020-01-01 | End: 2020-01-01

## 2020-01-01 RX ORDER — OXYCODONE AND ACETAMINOPHEN 5; 325 MG/1; MG/1
1 TABLET ORAL
Status: DISCONTINUED | OUTPATIENT
Start: 2020-01-01 | End: 2020-01-01 | Stop reason: HOSPADM

## 2020-01-01 RX ORDER — THERA TABS 400 MCG
1 TAB ORAL DAILY
Qty: 30 TAB | Refills: 1 | Status: SHIPPED | OUTPATIENT
Start: 2020-01-01 | End: 2020-01-01

## 2020-01-01 RX ORDER — SODIUM,POTASSIUM PHOSPHATES 280-250MG
1 POWDER IN PACKET (EA) ORAL 4 TIMES DAILY
Status: DISCONTINUED | OUTPATIENT
Start: 2020-01-01 | End: 2020-01-01

## 2020-01-01 RX ORDER — CARVEDILOL 3.12 MG/1
3.12 TABLET ORAL 2 TIMES DAILY WITH MEALS
Qty: 60 TAB | Refills: 2 | Status: SHIPPED | OUTPATIENT
Start: 2020-01-01

## 2020-01-01 RX ORDER — ACETAMINOPHEN 10 MG/ML
650 INJECTION, SOLUTION INTRAVENOUS ONCE
Status: COMPLETED | OUTPATIENT
Start: 2020-01-01 | End: 2020-01-01

## 2020-01-01 RX ORDER — INSULIN GLARGINE 100 [IU]/ML
10 INJECTION, SOLUTION SUBCUTANEOUS DAILY
Status: DISCONTINUED | OUTPATIENT
Start: 2020-01-01 | End: 2020-01-01 | Stop reason: HOSPADM

## 2020-01-01 RX ORDER — ALBUTEROL SULFATE 1.25 MG/3ML
1.25 SOLUTION RESPIRATORY (INHALATION)
Status: DISCONTINUED | OUTPATIENT
Start: 2020-01-01 | End: 2020-01-01 | Stop reason: HOSPADM

## 2020-01-01 RX ORDER — CHOLESTYRAMINE 4 G/4.8G
4 POWDER, FOR SUSPENSION ORAL
Status: COMPLETED | OUTPATIENT
Start: 2020-01-01 | End: 2020-01-01

## 2020-01-01 RX ORDER — METRONIDAZOLE 500 MG/100ML
500 INJECTION, SOLUTION INTRAVENOUS EVERY 6 HOURS
Status: DISCONTINUED | OUTPATIENT
Start: 2020-01-01 | End: 2020-01-01

## 2020-01-01 RX ORDER — METOCLOPRAMIDE HYDROCHLORIDE 5 MG/ML
5 INJECTION INTRAMUSCULAR; INTRAVENOUS EVERY 6 HOURS
Status: DISCONTINUED | OUTPATIENT
Start: 2020-01-01 | End: 2020-01-01

## 2020-01-01 RX ORDER — INSULIN LISPRO 100 [IU]/ML
INJECTION, SOLUTION INTRAVENOUS; SUBCUTANEOUS EVERY 6 HOURS
Status: DISCONTINUED | OUTPATIENT
Start: 2020-01-01 | End: 2020-01-01

## 2020-01-01 RX ORDER — GABAPENTIN 300 MG/1
1 CAPSULE ORAL
COMMUNITY
End: 2020-01-01

## 2020-01-01 RX ORDER — HEPARIN SODIUM 1000 [USP'U]/ML
3000 INJECTION, SOLUTION INTRAVENOUS; SUBCUTANEOUS ONCE
Status: ACTIVE | OUTPATIENT
Start: 2020-01-01 | End: 2020-01-01

## 2020-01-01 RX ORDER — CHLORHEXIDINE GLUCONATE 1.2 MG/ML
10 RINSE ORAL EVERY 12 HOURS
Status: DISCONTINUED | OUTPATIENT
Start: 2020-01-01 | End: 2020-01-01

## 2020-01-01 RX ORDER — FACIAL-BODY WIPES
10 EACH TOPICAL DAILY PRN
Status: DISCONTINUED | OUTPATIENT
Start: 2020-01-01 | End: 2020-01-01

## 2020-01-01 RX ORDER — DIVALPROEX SODIUM 250 MG/1
1 TABLET, DELAYED RELEASE ORAL
COMMUNITY
End: 2020-01-01

## 2020-01-01 RX ORDER — VANCOMYCIN HYDROCHLORIDE
1250 EVERY 12 HOURS
Status: DISCONTINUED | OUTPATIENT
Start: 2020-01-01 | End: 2020-01-01 | Stop reason: ALTCHOICE

## 2020-01-01 RX ORDER — GUAIFENESIN/DEXTROMETHORPHAN 100-10MG/5
10 SYRUP ORAL
Qty: 100 ML | Refills: 0 | Status: SHIPPED | OUTPATIENT
Start: 2020-01-01 | End: 2020-01-01

## 2020-01-01 RX ORDER — GUAIFENESIN/DEXTROMETHORPHAN 100-10MG/5
10 SYRUP ORAL
Status: DISCONTINUED | OUTPATIENT
Start: 2020-01-01 | End: 2020-01-01 | Stop reason: HOSPADM

## 2020-01-01 RX ORDER — AMIODARONE HYDROCHLORIDE 200 MG/1
200 TABLET ORAL 2 TIMES DAILY
Qty: 4 TAB | Refills: 0 | Status: SHIPPED | OUTPATIENT
Start: 2020-01-01 | End: 2020-01-01 | Stop reason: SDUPTHER

## 2020-01-01 RX ORDER — SODIUM CHLORIDE 9 MG/ML
250 INJECTION, SOLUTION INTRAVENOUS ONCE
Status: COMPLETED | OUTPATIENT
Start: 2020-01-01 | End: 2020-01-01

## 2020-01-01 RX ORDER — ALBUTEROL SULFATE 90 UG/1
1 AEROSOL, METERED RESPIRATORY (INHALATION)
Status: DISCONTINUED | OUTPATIENT
Start: 2020-01-01 | End: 2020-01-01 | Stop reason: HOSPADM

## 2020-01-01 RX ORDER — ONDANSETRON 2 MG/ML
4 INJECTION INTRAMUSCULAR; INTRAVENOUS
Status: DISCONTINUED | OUTPATIENT
Start: 2020-01-01 | End: 2020-01-01 | Stop reason: HOSPADM

## 2020-01-01 RX ORDER — AMLODIPINE BESYLATE 5 MG/1
5 TABLET ORAL DAILY
Qty: 30 TAB | Refills: 3 | Status: SHIPPED | OUTPATIENT
Start: 2020-01-01 | End: 2020-01-01

## 2020-01-01 RX ORDER — METHOCARBAMOL 500 MG/1
500 TABLET, FILM COATED ORAL ONCE
Status: COMPLETED | OUTPATIENT
Start: 2020-01-01 | End: 2020-01-01

## 2020-01-01 RX ORDER — AZITHROMYCIN 250 MG/1
TABLET, FILM COATED ORAL
COMMUNITY
End: 2020-01-01

## 2020-01-01 RX ORDER — OXYCODONE AND ACETAMINOPHEN 5; 325 MG/1; MG/1
1 TABLET ORAL
COMMUNITY
End: 2020-01-01

## 2020-01-01 RX ORDER — SERTRALINE HYDROCHLORIDE 100 MG/1
1 TABLET, FILM COATED ORAL DAILY
COMMUNITY
Start: 2020-01-01

## 2020-01-01 RX ORDER — VANCOMYCIN HYDROCHLORIDE
1250
Status: DISCONTINUED | OUTPATIENT
Start: 2020-01-01 | End: 2020-01-01

## 2020-01-01 RX ORDER — ACETAMINOPHEN 10 MG/ML
1000 INJECTION, SOLUTION INTRAVENOUS ONCE
Status: COMPLETED | OUTPATIENT
Start: 2020-01-01 | End: 2020-01-01

## 2020-01-01 RX ORDER — AMIODARONE HYDROCHLORIDE 200 MG/1
200 TABLET ORAL DAILY
Qty: 60 TAB | Refills: 3 | Status: SHIPPED | OUTPATIENT
Start: 2020-01-01

## 2020-01-01 RX ORDER — DOXYCYCLINE 100 MG/1
CAPSULE ORAL
COMMUNITY
Start: 2019-01-01 | End: 2020-01-01

## 2020-01-01 RX ORDER — PREDNISONE 10 MG/1
30 TABLET ORAL
Qty: 90 TAB | Refills: 1 | Status: SHIPPED | OUTPATIENT
Start: 2020-01-01

## 2020-01-01 RX ORDER — METOLAZONE 2.5 MG/1
TABLET ORAL
COMMUNITY
End: 2020-01-01

## 2020-01-01 RX ORDER — AMLODIPINE BESYLATE 5 MG/1
5 TABLET ORAL DAILY
Qty: 30 TAB | Refills: 1 | Status: SHIPPED | OUTPATIENT
Start: 2020-01-01 | End: 2020-01-01 | Stop reason: SDUPTHER

## 2020-01-01 RX ORDER — MIDODRINE HYDROCHLORIDE 5 MG/1
5 TABLET ORAL
Status: DISCONTINUED | OUTPATIENT
Start: 2020-01-01 | End: 2020-01-01

## 2020-01-01 RX ORDER — ALBUTEROL SULFATE 0.83 MG/ML
2.5 SOLUTION RESPIRATORY (INHALATION)
Status: DISCONTINUED | OUTPATIENT
Start: 2020-01-01 | End: 2020-01-01 | Stop reason: HOSPADM

## 2020-01-01 RX ORDER — DIPHENHYDRAMINE HYDROCHLORIDE 50 MG/ML
12.5 INJECTION, SOLUTION INTRAMUSCULAR; INTRAVENOUS
Status: DISCONTINUED | OUTPATIENT
Start: 2020-01-01 | End: 2020-01-01 | Stop reason: HOSPADM

## 2020-01-01 RX ORDER — IBUPROFEN 200 MG
200 CAPSULE ORAL 2 TIMES DAILY
Qty: 60 TAB | Refills: 0 | Status: SHIPPED | OUTPATIENT
Start: 2020-01-01

## 2020-01-01 RX ORDER — CEFPODOXIME PROXETIL 100 MG/1
200 TABLET, FILM COATED ORAL EVERY 12 HOURS
Status: DISCONTINUED | OUTPATIENT
Start: 2020-01-01 | End: 2020-01-01 | Stop reason: HOSPADM

## 2020-01-01 RX ORDER — HEPARIN SODIUM 5000 [USP'U]/ML
5000 INJECTION, SOLUTION INTRAVENOUS; SUBCUTANEOUS EVERY 8 HOURS
Status: DISCONTINUED | OUTPATIENT
Start: 2020-01-01 | End: 2020-01-01

## 2020-01-01 RX ORDER — DOCUSATE SODIUM 100 MG/1
100 CAPSULE, LIQUID FILLED ORAL
Qty: 30 CAP | Refills: 0 | Status: SHIPPED | OUTPATIENT
Start: 2020-01-01 | End: 2020-01-01

## 2020-01-01 RX ORDER — GUAIFENESIN 600 MG/1
600 TABLET, EXTENDED RELEASE ORAL EVERY 12 HOURS
Qty: 14 TAB | Refills: 0 | Status: SHIPPED | OUTPATIENT
Start: 2020-01-01 | End: 2020-01-01

## 2020-01-01 RX ORDER — DIGOXIN 0.25 MG/ML
250 INJECTION INTRAMUSCULAR; INTRAVENOUS
Status: COMPLETED | OUTPATIENT
Start: 2020-01-01 | End: 2020-01-01

## 2020-01-01 RX ORDER — PHENYLEPHRINE HCL IN 0.9% NACL 1 MG/10 ML
SYRINGE (ML) INTRAVENOUS
Status: COMPLETED
Start: 2020-01-01 | End: 2020-01-01

## 2020-01-01 RX ORDER — AMLODIPINE BESYLATE 2.5 MG/1
2.5 TABLET ORAL DAILY
Status: DISCONTINUED | OUTPATIENT
Start: 2020-01-01 | End: 2020-01-01

## 2020-01-01 RX ORDER — OXYCODONE AND ACETAMINOPHEN 5; 325 MG/1; MG/1
1 TABLET ORAL
Qty: 20 TAB | Refills: 0 | Status: ON HOLD | OUTPATIENT
Start: 2020-01-01 | End: 2020-01-01

## 2020-01-01 RX ORDER — METHADONE HYDROCHLORIDE 10 MG/1
20 TABLET ORAL DAILY
Status: DISCONTINUED | OUTPATIENT
Start: 2020-01-01 | End: 2020-01-01 | Stop reason: HOSPADM

## 2020-01-01 RX ORDER — ALLOPURINOL 100 MG/1
1 TABLET ORAL DAILY
COMMUNITY
Start: 2020-01-01

## 2020-01-01 RX ORDER — AMIODARONE HYDROCHLORIDE 200 MG/1
200 TABLET ORAL 2 TIMES DAILY
Qty: 4 TAB | Refills: 0 | Status: SHIPPED | OUTPATIENT
Start: 2020-01-01 | End: 2020-01-01

## 2020-01-01 RX ORDER — FENTANYL CITRATE 50 UG/ML
INJECTION, SOLUTION INTRAMUSCULAR; INTRAVENOUS
Status: COMPLETED
Start: 2020-01-01 | End: 2020-01-01

## 2020-01-01 RX ORDER — FUROSEMIDE 20 MG/1
20 TABLET ORAL ONCE
Status: COMPLETED | OUTPATIENT
Start: 2020-01-01 | End: 2020-01-01

## 2020-01-01 RX ORDER — FENTANYL CITRATE 50 UG/ML
INJECTION, SOLUTION INTRAMUSCULAR; INTRAVENOUS AS NEEDED
Status: DISCONTINUED | OUTPATIENT
Start: 2020-01-01 | End: 2020-01-01 | Stop reason: HOSPADM

## 2020-01-01 RX ORDER — LEVOTHYROXINE SODIUM 100 UG/1
100 TABLET ORAL
COMMUNITY
End: 2020-01-01

## 2020-01-01 RX ORDER — ROSUVASTATIN CALCIUM 5 MG/1
5 TABLET, COATED ORAL
Status: DISCONTINUED | OUTPATIENT
Start: 2020-01-01 | End: 2020-01-01 | Stop reason: HOSPADM

## 2020-01-01 RX ORDER — POTASSIUM CHLORIDE 14.9 MG/ML
10 INJECTION INTRAVENOUS
Status: COMPLETED | OUTPATIENT
Start: 2020-01-01 | End: 2020-01-01

## 2020-01-01 RX ORDER — PHENYLEPHRINE HYDROCHLORIDE 10 MG/ML
200 INJECTION INTRAVENOUS ONCE
Status: DISPENSED | OUTPATIENT
Start: 2020-01-01 | End: 2020-01-01

## 2020-01-01 RX ORDER — DIVALPROEX SODIUM 250 MG/1
250 TABLET, DELAYED RELEASE ORAL DAILY
Status: DISCONTINUED | OUTPATIENT
Start: 2020-01-01 | End: 2020-01-01

## 2020-01-01 RX ORDER — VANCOMYCIN/0.9 % SOD CHLORIDE 750 MG/250
750 PLASTIC BAG, INJECTION (ML) INTRAVENOUS EVERY 12 HOURS
Status: DISCONTINUED | OUTPATIENT
Start: 2020-01-01 | End: 2020-01-01

## 2020-01-01 RX ORDER — ROCURONIUM BROMIDE 10 MG/ML
INJECTION, SOLUTION INTRAVENOUS AS NEEDED
Status: DISCONTINUED | OUTPATIENT
Start: 2020-01-01 | End: 2020-01-01 | Stop reason: HOSPADM

## 2020-01-01 RX ORDER — DOXYCYCLINE 100 MG/1
100 CAPSULE ORAL EVERY 12 HOURS
Qty: 10 CAP | Refills: 0 | Status: SHIPPED | OUTPATIENT
Start: 2020-01-01 | End: 2020-01-01

## 2020-01-01 RX ORDER — GUAIFENESIN 100 MG/5ML
324 LIQUID (ML) ORAL
Status: COMPLETED | OUTPATIENT
Start: 2020-01-01 | End: 2020-01-01

## 2020-01-01 RX ORDER — POLYETHYLENE GLYCOL 3350 17 G/17G
17 POWDER, FOR SOLUTION ORAL DAILY
Status: DISCONTINUED | OUTPATIENT
Start: 2020-01-01 | End: 2020-01-01

## 2020-01-01 RX ORDER — METHYLPREDNISOLONE 4 MG/1
TABLET ORAL
COMMUNITY
Start: 2019-01-01 | End: 2020-01-01

## 2020-01-01 RX ORDER — OXYCODONE HYDROCHLORIDE 5 MG/1
5 TABLET ORAL
Status: DISCONTINUED | OUTPATIENT
Start: 2020-01-01 | End: 2020-01-01 | Stop reason: HOSPADM

## 2020-01-01 RX ORDER — LORAZEPAM 2 MG/ML
2 INJECTION INTRAMUSCULAR
Status: DISCONTINUED | OUTPATIENT
Start: 2020-01-01 | End: 2020-01-01

## 2020-01-01 RX ORDER — METHADONE HYDROCHLORIDE 10 MG/ML
20 CONCENTRATE ORAL ONCE
Status: COMPLETED | OUTPATIENT
Start: 2020-01-01 | End: 2020-01-01

## 2020-01-01 RX ORDER — SODIUM CHLORIDE FOR INHALATION 3 %
4 VIAL, NEBULIZER (ML) INHALATION EVERY 12 HOURS
Status: DISCONTINUED | OUTPATIENT
Start: 2020-01-01 | End: 2020-01-01

## 2020-01-01 RX ORDER — LANOLIN ALCOHOL/MO/W.PET/CERES
325 CREAM (GRAM) TOPICAL
Qty: 30 TAB | Refills: 0 | Status: SHIPPED | OUTPATIENT
Start: 2020-01-01

## 2020-01-01 RX ORDER — MIDAZOLAM HYDROCHLORIDE 1 MG/ML
INJECTION, SOLUTION INTRAMUSCULAR; INTRAVENOUS
Status: COMPLETED
Start: 2020-01-01 | End: 2020-01-01

## 2020-01-01 RX ORDER — PHENYLEPHRINE HYDROCHLORIDE 10 MG/ML
INJECTION INTRAVENOUS
Status: DISPENSED
Start: 2020-01-01 | End: 2020-01-01

## 2020-01-01 RX ORDER — HYDROMORPHONE HYDROCHLORIDE 1 MG/ML
1 INJECTION, SOLUTION INTRAMUSCULAR; INTRAVENOUS; SUBCUTANEOUS ONCE
Status: COMPLETED | OUTPATIENT
Start: 2020-01-01 | End: 2020-01-01

## 2020-01-01 RX ORDER — ACETAMINOPHEN 325 MG/1
650 TABLET ORAL
Status: COMPLETED | OUTPATIENT
Start: 2020-01-01 | End: 2020-01-01

## 2020-01-01 RX ORDER — MIDAZOLAM HYDROCHLORIDE 1 MG/ML
1-2 INJECTION, SOLUTION INTRAMUSCULAR; INTRAVENOUS
Status: DISCONTINUED | OUTPATIENT
Start: 2020-01-01 | End: 2020-01-01

## 2020-01-01 RX ORDER — INSULIN GLARGINE 100 [IU]/ML
10 INJECTION, SOLUTION SUBCUTANEOUS DAILY
Qty: 1 VIAL | Refills: 2 | Status: SHIPPED | OUTPATIENT
Start: 2020-01-01

## 2020-01-01 RX ORDER — INSULIN LISPRO 100 [IU]/ML
6 INJECTION, SOLUTION INTRAVENOUS; SUBCUTANEOUS
Status: DISCONTINUED | OUTPATIENT
Start: 2020-01-01 | End: 2020-01-01

## 2020-01-01 RX ORDER — ALBUTEROL SULFATE 90 UG/1
AEROSOL, METERED RESPIRATORY (INHALATION)
Qty: 8.5 INHALER | Refills: 4 | Status: SHIPPED | OUTPATIENT
Start: 2020-01-01

## 2020-01-01 RX ORDER — FLUCONAZOLE 2 MG/ML
400 INJECTION, SOLUTION INTRAVENOUS EVERY 24 HOURS
Status: DISCONTINUED | OUTPATIENT
Start: 2020-01-01 | End: 2020-01-01

## 2020-01-01 RX ADMIN — Medication 100 MCG/HR: at 22:41

## 2020-01-01 RX ADMIN — AMIODARONE HYDROCHLORIDE 200 MG: 200 TABLET ORAL at 18:58

## 2020-01-01 RX ADMIN — Medication: at 08:38

## 2020-01-01 RX ADMIN — REGADENOSON 0.4 MG: 0.08 INJECTION, SOLUTION INTRAVENOUS at 09:10

## 2020-01-01 RX ADMIN — INSULIN LISPRO 3 UNITS: 100 INJECTION, SOLUTION INTRAVENOUS; SUBCUTANEOUS at 16:18

## 2020-01-01 RX ADMIN — ACETAMINOPHEN ORAL SOLUTION 650 MG: 650 SOLUTION ORAL at 14:44

## 2020-01-01 RX ADMIN — BUSPIRONE HYDROCHLORIDE 5 MG: 5 TABLET ORAL at 21:34

## 2020-01-01 RX ADMIN — FENTANYL CITRATE 100 MCG: 50 INJECTION INTRAMUSCULAR; INTRAVENOUS at 06:31

## 2020-01-01 RX ADMIN — INSULIN LISPRO 12 UNITS: 100 INJECTION, SOLUTION INTRAVENOUS; SUBCUTANEOUS at 12:15

## 2020-01-01 RX ADMIN — PIPERACILLIN SODIUM AND TAZOBACTAM SODIUM 4.5 G: 4; .5 INJECTION, POWDER, LYOPHILIZED, FOR SOLUTION INTRAVENOUS at 05:47

## 2020-01-01 RX ADMIN — ALBUTEROL SULFATE 1 PUFF: 90 AEROSOL, METERED RESPIRATORY (INHALATION) at 16:00

## 2020-01-01 RX ADMIN — IPRATROPIUM BROMIDE AND ALBUTEROL SULFATE 3 ML: .5; 3 SOLUTION RESPIRATORY (INHALATION) at 20:13

## 2020-01-01 RX ADMIN — BARIUM SULFATE 700 MG: 700 TABLET ORAL at 12:50

## 2020-01-01 RX ADMIN — OXYCODONE HYDROCHLORIDE AND ACETAMINOPHEN 1 TABLET: 10; 325 TABLET ORAL at 05:52

## 2020-01-01 RX ADMIN — ALBUTEROL SULFATE 1 PUFF: 90 AEROSOL, METERED RESPIRATORY (INHALATION) at 08:00

## 2020-01-01 RX ADMIN — BUSPIRONE HYDROCHLORIDE 5 MG: 5 TABLET ORAL at 08:47

## 2020-01-01 RX ADMIN — PREDNISONE 30 MG: 5 TABLET ORAL at 08:12

## 2020-01-01 RX ADMIN — METRONIDAZOLE 500 MG: 500 INJECTION, SOLUTION INTRAVENOUS at 18:34

## 2020-01-01 RX ADMIN — GLYCOPYRROLATE AND FORMOTEROL FUMARATE 2 PUFF: 9; 4.8 AEROSOL, METERED RESPIRATORY (INHALATION) at 09:00

## 2020-01-01 RX ADMIN — Medication 10 ML: at 16:03

## 2020-01-01 RX ADMIN — PROPOFOL 30 MCG/KG/MIN: 10 INJECTION, EMULSION INTRAVENOUS at 00:34

## 2020-01-01 RX ADMIN — CEFEPIME 2 G: 2 INJECTION, POWDER, FOR SOLUTION INTRAVENOUS at 08:38

## 2020-01-01 RX ADMIN — ALBUTEROL SULFATE 1 PUFF: 90 AEROSOL, METERED RESPIRATORY (INHALATION) at 04:00

## 2020-01-01 RX ADMIN — PREDNISONE 40 MG: 20 TABLET ORAL at 10:40

## 2020-01-01 RX ADMIN — PIPERACILLIN SODIUM AND TAZOBACTAM SODIUM 4.5 G: 4; .5 INJECTION, POWDER, LYOPHILIZED, FOR SOLUTION INTRAVENOUS at 00:25

## 2020-01-01 RX ADMIN — INSULIN GLARGINE 15 UNITS: 100 INJECTION, SOLUTION SUBCUTANEOUS at 20:19

## 2020-01-01 RX ADMIN — PANTOPRAZOLE SODIUM 40 MG: 40 TABLET, DELAYED RELEASE ORAL at 16:02

## 2020-01-01 RX ADMIN — INSULIN GLARGINE 10 UNITS: 100 INJECTION, SOLUTION SUBCUTANEOUS at 09:03

## 2020-01-01 RX ADMIN — ALBUMIN HUMAN 25 G: 50 SOLUTION INTRAVENOUS at 19:22

## 2020-01-01 RX ADMIN — IPRATROPIUM BROMIDE AND ALBUTEROL SULFATE 3 ML: .5; 3 SOLUTION RESPIRATORY (INHALATION) at 03:44

## 2020-01-01 RX ADMIN — PANTOPRAZOLE SODIUM 40 MG: 40 INJECTION, POWDER, FOR SOLUTION INTRAVENOUS at 21:13

## 2020-01-01 RX ADMIN — IPRATROPIUM BROMIDE AND ALBUTEROL SULFATE 3 ML: .5; 3 SOLUTION RESPIRATORY (INHALATION) at 08:03

## 2020-01-01 RX ADMIN — IPRATROPIUM BROMIDE AND ALBUTEROL SULFATE 3 ML: .5; 3 SOLUTION RESPIRATORY (INHALATION) at 11:48

## 2020-01-01 RX ADMIN — SODIUM CHLORIDE 1000 MG: 900 INJECTION, SOLUTION INTRAVENOUS at 00:01

## 2020-01-01 RX ADMIN — AMIODARONE HYDROCHLORIDE 200 MG: 200 TABLET ORAL at 08:18

## 2020-01-01 RX ADMIN — CALCIUM GLUCONATE 2 G: 98 INJECTION, SOLUTION INTRAVENOUS at 07:35

## 2020-01-01 RX ADMIN — HEPARIN SODIUM 3000 UNITS: 1000 INJECTION, SOLUTION INTRAVENOUS; SUBCUTANEOUS at 05:56

## 2020-01-01 RX ADMIN — Medication 5 TABLET: at 08:25

## 2020-01-01 RX ADMIN — FERROUS SULFATE TAB 325 MG (65 MG ELEMENTAL FE) 325 MG: 325 (65 FE) TAB at 08:26

## 2020-01-01 RX ADMIN — LEVOTHYROXINE SODIUM 100 MCG: 100 TABLET ORAL at 06:14

## 2020-01-01 RX ADMIN — INSULIN LISPRO 6 UNITS: 100 INJECTION, SOLUTION INTRAVENOUS; SUBCUTANEOUS at 17:00

## 2020-01-01 RX ADMIN — HYDROCORTISONE SODIUM SUCCINATE 50 MG: 100 INJECTION, POWDER, FOR SOLUTION INTRAMUSCULAR; INTRAVENOUS at 23:03

## 2020-01-01 RX ADMIN — DIVALPROEX SODIUM 500 MG: 250 TABLET, DELAYED RELEASE ORAL at 21:03

## 2020-01-01 RX ADMIN — HYDROCORTISONE SODIUM SUCCINATE 50 MG: 100 INJECTION, POWDER, FOR SOLUTION INTRAMUSCULAR; INTRAVENOUS at 17:15

## 2020-01-01 RX ADMIN — ROSUVASTATIN CALCIUM 5 MG: 10 TABLET, FILM COATED ORAL at 21:26

## 2020-01-01 RX ADMIN — FUROSEMIDE 20 MG: 20 TABLET ORAL at 09:00

## 2020-01-01 RX ADMIN — POTASSIUM BICARBONATE 40 MEQ: 782 TABLET, EFFERVESCENT ORAL at 08:59

## 2020-01-01 RX ADMIN — SODIUM CHLORIDE SOLN NEBU 3% 4 ML: 3 NEBU SOLN at 20:14

## 2020-01-01 RX ADMIN — INSULIN LISPRO 4 UNITS: 100 INJECTION, SOLUTION INTRAVENOUS; SUBCUTANEOUS at 13:26

## 2020-01-01 RX ADMIN — SODIUM CHLORIDE 100 MG: 9 INJECTION, SOLUTION INTRAVENOUS at 18:03

## 2020-01-01 RX ADMIN — IPRATROPIUM BROMIDE AND ALBUTEROL SULFATE 3 ML: .5; 3 SOLUTION RESPIRATORY (INHALATION) at 16:52

## 2020-01-01 RX ADMIN — GUAIFENESIN 600 MG: 600 TABLET, EXTENDED RELEASE ORAL at 22:03

## 2020-01-01 RX ADMIN — CHOLESTYRAMINE 4 G: 4 POWDER, FOR SUSPENSION ORAL at 10:01

## 2020-01-01 RX ADMIN — APIXABAN 5 MG: 5 TABLET, FILM COATED ORAL at 09:23

## 2020-01-01 RX ADMIN — PANTOPRAZOLE SODIUM 40 MG: 40 TABLET, DELAYED RELEASE ORAL at 06:55

## 2020-01-01 RX ADMIN — METHOCARBAMOL 500 MG: 500 TABLET, FILM COATED ORAL at 15:25

## 2020-01-01 RX ADMIN — IPRATROPIUM BROMIDE AND ALBUTEROL SULFATE 3 ML: .5; 3 SOLUTION RESPIRATORY (INHALATION) at 01:24

## 2020-01-01 RX ADMIN — DIVALPROEX SODIUM 250 MG: 250 TABLET, DELAYED RELEASE ORAL at 09:33

## 2020-01-01 RX ADMIN — IPRATROPIUM BROMIDE AND ALBUTEROL SULFATE 3 ML: .5; 3 SOLUTION RESPIRATORY (INHALATION) at 23:14

## 2020-01-01 RX ADMIN — GUAIFENESIN 600 MG: 600 TABLET, EXTENDED RELEASE ORAL at 21:26

## 2020-01-01 RX ADMIN — PANTOPRAZOLE SODIUM 40 MG: 40 INJECTION, POWDER, FOR SOLUTION INTRAVENOUS at 20:40

## 2020-01-01 RX ADMIN — LEVOTHYROXINE SODIUM 100 MCG: 100 TABLET ORAL at 12:16

## 2020-01-01 RX ADMIN — APIXABAN 5 MG: 5 TABLET, FILM COATED ORAL at 08:47

## 2020-01-01 RX ADMIN — Medication 5 ML: at 06:00

## 2020-01-01 RX ADMIN — INSULIN LISPRO 6 UNITS: 100 INJECTION, SOLUTION INTRAVENOUS; SUBCUTANEOUS at 12:00

## 2020-01-01 RX ADMIN — METHOCARBAMOL 500 MG: 500 TABLET, FILM COATED ORAL at 20:50

## 2020-01-01 RX ADMIN — FENTANYL CITRATE 100 MCG: 50 INJECTION INTRAMUSCULAR; INTRAVENOUS at 21:28

## 2020-01-01 RX ADMIN — PREDNISONE 30 MG: 10 TABLET ORAL at 09:02

## 2020-01-01 RX ADMIN — OXYCODONE HYDROCHLORIDE 5 MG: 5 TABLET ORAL at 01:40

## 2020-01-01 RX ADMIN — POTASSIUM CHLORIDE: 2 INJECTION, SOLUTION, CONCENTRATE INTRAVENOUS at 19:39

## 2020-01-01 RX ADMIN — INSULIN LISPRO 2 UNITS: 100 INJECTION, SOLUTION INTRAVENOUS; SUBCUTANEOUS at 00:05

## 2020-01-01 RX ADMIN — PIPERACILLIN SODIUM AND TAZOBACTAM SODIUM 4.5 G: 4; .5 INJECTION, POWDER, LYOPHILIZED, FOR SOLUTION INTRAVENOUS at 00:43

## 2020-01-01 RX ADMIN — INSULIN GLARGINE 40 UNITS: 100 INJECTION, SOLUTION SUBCUTANEOUS at 10:33

## 2020-01-01 RX ADMIN — POTASSIUM CHLORIDE 20 MEQ: 29.8 INJECTION, SOLUTION INTRAVENOUS at 11:00

## 2020-01-01 RX ADMIN — PIPERACILLIN SODIUM AND TAZOBACTAM SODIUM 4.5 G: 4; .5 INJECTION, POWDER, LYOPHILIZED, FOR SOLUTION INTRAVENOUS at 07:15

## 2020-01-01 RX ADMIN — AZTREONAM 2 G: 2 INJECTION, POWDER, LYOPHILIZED, FOR SOLUTION INTRAMUSCULAR; INTRAVENOUS at 17:18

## 2020-01-01 RX ADMIN — INSULIN LISPRO 4 UNITS: 100 INJECTION, SOLUTION INTRAVENOUS; SUBCUTANEOUS at 09:44

## 2020-01-01 RX ADMIN — METHOCARBAMOL 500 MG: 500 TABLET, FILM COATED ORAL at 13:08

## 2020-01-01 RX ADMIN — INSULIN GLARGINE 10 UNITS: 100 INJECTION, SOLUTION SUBCUTANEOUS at 09:40

## 2020-01-01 RX ADMIN — LEVOTHYROXINE SODIUM 75 MCG: 25 TABLET ORAL at 06:00

## 2020-01-01 RX ADMIN — OXYBUTYNIN CHLORIDE 5 MG: 5 TABLET ORAL at 17:30

## 2020-01-01 RX ADMIN — IPRATROPIUM BROMIDE AND ALBUTEROL SULFATE 3 ML: .5; 3 SOLUTION RESPIRATORY (INHALATION) at 12:18

## 2020-01-01 RX ADMIN — BUDESONIDE 500 MCG: 0.5 INHALANT RESPIRATORY (INHALATION) at 07:07

## 2020-01-01 RX ADMIN — Medication 1 CAPSULE: at 09:34

## 2020-01-01 RX ADMIN — AMIODARONE HYDROCHLORIDE 0.5 MG/MIN: 1.8 INJECTION, SOLUTION INTRAVENOUS at 00:32

## 2020-01-01 RX ADMIN — CEFEPIME 2 G: 2 INJECTION, POWDER, FOR SOLUTION INTRAVENOUS at 22:06

## 2020-01-01 RX ADMIN — PIPERACILLIN SODIUM AND TAZOBACTAM SODIUM 4.5 G: 4; .5 INJECTION, POWDER, LYOPHILIZED, FOR SOLUTION INTRAVENOUS at 00:15

## 2020-01-01 RX ADMIN — APIXABAN 5 MG: 5 TABLET, FILM COATED ORAL at 09:58

## 2020-01-01 RX ADMIN — POTASSIUM CHLORIDE 10 MEQ: 10 INJECTION, SOLUTION INTRAVENOUS at 21:55

## 2020-01-01 RX ADMIN — Medication 30 ML: at 08:33

## 2020-01-01 RX ADMIN — OXYBUTYNIN CHLORIDE 5 MG: 5 TABLET ORAL at 09:43

## 2020-01-01 RX ADMIN — CHOLESTYRAMINE 4 G: 4 POWDER, FOR SUSPENSION ORAL at 08:35

## 2020-01-01 RX ADMIN — Medication 100 MCG: at 13:44

## 2020-01-01 RX ADMIN — CEFEPIME 2 G: 2 INJECTION, POWDER, FOR SOLUTION INTRAVENOUS at 23:36

## 2020-01-01 RX ADMIN — ALBUMIN (HUMAN) 25 G: 12.5 SOLUTION INTRAVENOUS at 20:45

## 2020-01-01 RX ADMIN — FERROUS SULFATE TAB 325 MG (65 MG ELEMENTAL FE) 325 MG: 325 (65 FE) TAB at 08:39

## 2020-01-01 RX ADMIN — NOREPINEPHRINE BITARTRATE 4 MCG/MIN: 1 INJECTION INTRAVENOUS at 01:58

## 2020-01-01 RX ADMIN — INSULIN LISPRO 4 UNITS: 100 INJECTION, SOLUTION INTRAVENOUS; SUBCUTANEOUS at 08:30

## 2020-01-01 RX ADMIN — SERTRALINE HYDROCHLORIDE 50 MG: 50 TABLET ORAL at 09:25

## 2020-01-01 RX ADMIN — POTASSIUM CHLORIDE 40 MEQ: 1500 TABLET, EXTENDED RELEASE ORAL at 18:27

## 2020-01-01 RX ADMIN — LEVOFLOXACIN 750 MG: 5 INJECTION, SOLUTION INTRAVENOUS at 11:27

## 2020-01-01 RX ADMIN — LUBIPROSTONE 24 MCG: 24 CAPSULE, GELATIN COATED ORAL at 08:53

## 2020-01-01 RX ADMIN — ALBUTEROL SULFATE 1 PUFF: 90 AEROSOL, METERED RESPIRATORY (INHALATION) at 12:00

## 2020-01-01 RX ADMIN — POTASSIUM & SODIUM PHOSPHATES POWDER PACK 280-160-250 MG 1 PACKET: 280-160-250 PACK at 09:58

## 2020-01-01 RX ADMIN — OXYCODONE AND ACETAMINOPHEN 1 TABLET: 5; 325 TABLET ORAL at 08:29

## 2020-01-01 RX ADMIN — INSULIN LISPRO 4 UNITS: 100 INJECTION, SOLUTION INTRAVENOUS; SUBCUTANEOUS at 17:23

## 2020-01-01 RX ADMIN — INSULIN LISPRO 6 UNITS: 100 INJECTION, SOLUTION INTRAVENOUS; SUBCUTANEOUS at 12:44

## 2020-01-01 RX ADMIN — AMIODARONE HYDROCHLORIDE 200 MG: 200 TABLET ORAL at 09:24

## 2020-01-01 RX ADMIN — POTASSIUM CHLORIDE 10 MEQ: 10 INJECTION, SOLUTION INTRAVENOUS at 08:30

## 2020-01-01 RX ADMIN — ALBUTEROL SULFATE 1 PUFF: 90 AEROSOL, METERED RESPIRATORY (INHALATION) at 00:10

## 2020-01-01 RX ADMIN — OXYBUTYNIN CHLORIDE 5 MG: 5 TABLET ORAL at 17:24

## 2020-01-01 RX ADMIN — FLUCONAZOLE 400 MG: 400 INJECTION, SOLUTION INTRAVENOUS at 16:32

## 2020-01-01 RX ADMIN — ASPIRIN 81 MG 81 MG: 81 TABLET ORAL at 08:39

## 2020-01-01 RX ADMIN — PIPERACILLIN AND TAZOBACTAM 3.38 G: 3; .375 INJECTION, POWDER, LYOPHILIZED, FOR SOLUTION INTRAVENOUS at 05:33

## 2020-01-01 RX ADMIN — FERROUS SULFATE TAB 325 MG (65 MG ELEMENTAL FE) 325 MG: 325 (65 FE) TAB at 09:31

## 2020-01-01 RX ADMIN — ASPIRIN 81 MG CHEWABLE TABLET 81 MG: 81 TABLET CHEWABLE at 08:35

## 2020-01-01 RX ADMIN — INSULIN LISPRO 4 UNITS: 100 INJECTION, SOLUTION INTRAVENOUS; SUBCUTANEOUS at 20:48

## 2020-01-01 RX ADMIN — CALCIUM CITRATE 200 MG (950 MG) TABLET 200 MG: at 17:29

## 2020-01-01 RX ADMIN — ASPIRIN 81 MG CHEWABLE TABLET 81 MG: 81 TABLET CHEWABLE at 09:21

## 2020-01-01 RX ADMIN — APIXABAN 5 MG: 5 TABLET, FILM COATED ORAL at 17:07

## 2020-01-01 RX ADMIN — BUSPIRONE HYDROCHLORIDE 5 MG: 5 TABLET ORAL at 08:15

## 2020-01-01 RX ADMIN — INSULIN LISPRO 4 UNITS: 100 INJECTION, SOLUTION INTRAVENOUS; SUBCUTANEOUS at 17:28

## 2020-01-01 RX ADMIN — IPRATROPIUM BROMIDE AND ALBUTEROL SULFATE 3 ML: .5; 3 SOLUTION RESPIRATORY (INHALATION) at 23:42

## 2020-01-01 RX ADMIN — PIPERACILLIN SODIUM AND TAZOBACTAM SODIUM 4.5 G: 4; .5 INJECTION, POWDER, LYOPHILIZED, FOR SOLUTION INTRAVENOUS at 05:46

## 2020-01-01 RX ADMIN — AMIODARONE HYDROCHLORIDE 200 MG: 200 TABLET ORAL at 19:04

## 2020-01-01 RX ADMIN — INSULIN LISPRO 2 UNITS: 100 INJECTION, SOLUTION INTRAVENOUS; SUBCUTANEOUS at 13:44

## 2020-01-01 RX ADMIN — INSULIN LISPRO 2 UNITS: 100 INJECTION, SOLUTION INTRAVENOUS; SUBCUTANEOUS at 17:21

## 2020-01-01 RX ADMIN — Medication 1 CAPSULE: at 08:16

## 2020-01-01 RX ADMIN — BUSPIRONE HYDROCHLORIDE 5 MG: 5 TABLET ORAL at 16:19

## 2020-01-01 RX ADMIN — METHADONE HYDROCHLORIDE 20 MG: 10 TABLET ORAL at 08:41

## 2020-01-01 RX ADMIN — PANTOPRAZOLE SODIUM 40 MG: 40 TABLET, DELAYED RELEASE ORAL at 16:57

## 2020-01-01 RX ADMIN — IPRATROPIUM BROMIDE AND ALBUTEROL SULFATE 3 ML: .5; 3 SOLUTION RESPIRATORY (INHALATION) at 23:51

## 2020-01-01 RX ADMIN — Medication 30 MCG/MIN: at 16:30

## 2020-01-01 RX ADMIN — METHOCARBAMOL 500 MG: 500 TABLET, FILM COATED ORAL at 21:50

## 2020-01-01 RX ADMIN — OXYCODONE HYDROCHLORIDE AND ACETAMINOPHEN 1 TABLET: 10; 325 TABLET ORAL at 01:03

## 2020-01-01 RX ADMIN — APIXABAN 5 MG: 5 TABLET, FILM COATED ORAL at 09:41

## 2020-01-01 RX ADMIN — METRONIDAZOLE 500 MG: 500 INJECTION, SOLUTION INTRAVENOUS at 09:10

## 2020-01-01 RX ADMIN — PANTOPRAZOLE SODIUM 40 MG: 40 TABLET, DELAYED RELEASE ORAL at 06:37

## 2020-01-01 RX ADMIN — BUDESONIDE 500 MCG: 0.5 INHALANT RESPIRATORY (INHALATION) at 08:03

## 2020-01-01 RX ADMIN — PREDNISONE 30 MG: 5 TABLET ORAL at 09:24

## 2020-01-01 RX ADMIN — PANTOPRAZOLE SODIUM 40 MG: 40 INJECTION, POWDER, FOR SOLUTION INTRAVENOUS at 09:26

## 2020-01-01 RX ADMIN — PIPERACILLIN SODIUM AND TAZOBACTAM SODIUM 4.5 G: 4; .5 INJECTION, POWDER, LYOPHILIZED, FOR SOLUTION INTRAVENOUS at 06:24

## 2020-01-01 RX ADMIN — PANTOPRAZOLE SODIUM 40 MG: 40 INJECTION, POWDER, FOR SOLUTION INTRAVENOUS at 08:18

## 2020-01-01 RX ADMIN — MIDODRINE HYDROCHLORIDE 5 MG: 5 TABLET ORAL at 12:00

## 2020-01-01 RX ADMIN — GUAIFENESIN 600 MG: 600 TABLET, EXTENDED RELEASE ORAL at 08:45

## 2020-01-01 RX ADMIN — OXYCODONE AND ACETAMINOPHEN 1 TABLET: 5; 325 TABLET ORAL at 18:51

## 2020-01-01 RX ADMIN — HEPARIN SODIUM 5000 UNITS: 5000 INJECTION INTRAVENOUS; SUBCUTANEOUS at 12:54

## 2020-01-01 RX ADMIN — DIPHENHYDRAMINE HYDROCHLORIDE 25 MG: 50 INJECTION, SOLUTION INTRAMUSCULAR; INTRAVENOUS at 22:34

## 2020-01-01 RX ADMIN — PROPOFOL 50 MCG/KG/MIN: 10 INJECTION, EMULSION INTRAVENOUS at 22:59

## 2020-01-01 RX ADMIN — Medication 5 TABLET: at 08:37

## 2020-01-01 RX ADMIN — SENNOSIDES AND DOCUSATE SODIUM 2 TABLET: 8.6; 5 TABLET ORAL at 17:44

## 2020-01-01 RX ADMIN — IPRATROPIUM BROMIDE AND ALBUTEROL SULFATE 3 ML: .5; 3 SOLUTION RESPIRATORY (INHALATION) at 07:07

## 2020-01-01 RX ADMIN — POTASSIUM CHLORIDE 10 MEQ: 10 INJECTION, SOLUTION INTRAVENOUS at 01:41

## 2020-01-01 RX ADMIN — GLYCOPYRROLATE AND FORMOTEROL FUMARATE 2 PUFF: 9; 4.8 AEROSOL, METERED RESPIRATORY (INHALATION) at 07:40

## 2020-01-01 RX ADMIN — CHLORHEXIDINE GLUCONATE 0.12% ORAL RINSE 10 ML: 1.2 LIQUID ORAL at 20:58

## 2020-01-01 RX ADMIN — GLYCOPYRROLATE AND FORMOTEROL FUMARATE 2 PUFF: 9; 4.8 AEROSOL, METERED RESPIRATORY (INHALATION) at 15:35

## 2020-01-01 RX ADMIN — ROFLUMILAST 250 MCG: 500 TABLET ORAL at 08:40

## 2020-01-01 RX ADMIN — Medication 1 CAPSULE: at 12:15

## 2020-01-01 RX ADMIN — POTASSIUM CHLORIDE 40 MEQ: 1500 TABLET, EXTENDED RELEASE ORAL at 09:07

## 2020-01-01 RX ADMIN — Medication 5 TABLET: at 08:31

## 2020-01-01 RX ADMIN — PANTOPRAZOLE SODIUM 40 MG: 40 INJECTION, POWDER, FOR SOLUTION INTRAVENOUS at 08:31

## 2020-01-01 RX ADMIN — FAMOTIDINE 20 MG: 20 TABLET, FILM COATED ORAL at 23:50

## 2020-01-01 RX ADMIN — AMIODARONE HYDROCHLORIDE 200 MG: 200 TABLET ORAL at 17:07

## 2020-01-01 RX ADMIN — CHLORHEXIDINE GLUCONATE 0.12% ORAL RINSE 10 ML: 1.2 LIQUID ORAL at 08:30

## 2020-01-01 RX ADMIN — ROFLUMILAST 250 MCG: 500 TABLET ORAL at 08:27

## 2020-01-01 RX ADMIN — Medication 250 MG: at 09:27

## 2020-01-01 RX ADMIN — FERROUS SULFATE TAB 325 MG (65 MG ELEMENTAL FE) 325 MG: 325 (65 FE) TAB at 08:42

## 2020-01-01 RX ADMIN — ROSUVASTATIN CALCIUM 20 MG: 20 TABLET, COATED ORAL at 22:27

## 2020-01-01 RX ADMIN — CHLORHEXIDINE GLUCONATE 0.12% ORAL RINSE 10 ML: 1.2 LIQUID ORAL at 09:00

## 2020-01-01 RX ADMIN — Medication 10 ML: at 12:15

## 2020-01-01 RX ADMIN — PANTOPRAZOLE SODIUM 40 MG: 40 TABLET, DELAYED RELEASE ORAL at 16:23

## 2020-01-01 RX ADMIN — SODIUM CHLORIDE 1000 MG: 900 INJECTION, SOLUTION INTRAVENOUS at 22:24

## 2020-01-01 RX ADMIN — GLYCOPYRROLATE AND FORMOTEROL FUMARATE 2 PUFF: 9; 4.8 AEROSOL, METERED RESPIRATORY (INHALATION) at 09:23

## 2020-01-01 RX ADMIN — PIPERACILLIN SODIUM AND TAZOBACTAM SODIUM 4.5 G: 4; .5 INJECTION, POWDER, LYOPHILIZED, FOR SOLUTION INTRAVENOUS at 12:00

## 2020-01-01 RX ADMIN — CHOLESTYRAMINE 4 G: 4 POWDER, FOR SUSPENSION ORAL at 08:46

## 2020-01-01 RX ADMIN — ASPIRIN 81 MG CHEWABLE TABLET 81 MG: 81 TABLET CHEWABLE at 08:18

## 2020-01-01 RX ADMIN — HYDROCORTISONE SODIUM SUCCINATE 50 MG: 100 INJECTION, POWDER, FOR SOLUTION INTRAMUSCULAR; INTRAVENOUS at 05:27

## 2020-01-01 RX ADMIN — ASPIRIN 81 MG 81 MG: 81 TABLET ORAL at 09:33

## 2020-01-01 RX ADMIN — HYDROCORTISONE SODIUM SUCCINATE 50 MG: 100 INJECTION, POWDER, FOR SOLUTION INTRAMUSCULAR; INTRAVENOUS at 12:00

## 2020-01-01 RX ADMIN — GUAIFENESIN 600 MG: 600 TABLET, EXTENDED RELEASE ORAL at 21:25

## 2020-01-01 RX ADMIN — INSULIN GLARGINE 10 UNITS: 100 INJECTION, SOLUTION SUBCUTANEOUS at 09:45

## 2020-01-01 RX ADMIN — PANTOPRAZOLE SODIUM 40 MG: 40 INJECTION, POWDER, FOR SOLUTION INTRAVENOUS at 08:38

## 2020-01-01 RX ADMIN — BUDESONIDE 500 MCG: 0.5 INHALANT RESPIRATORY (INHALATION) at 21:11

## 2020-01-01 RX ADMIN — ALBUTEROL SULFATE 1 PUFF: 90 AEROSOL, METERED RESPIRATORY (INHALATION) at 20:18

## 2020-01-01 RX ADMIN — DIVALPROEX SODIUM 250 MG: 250 TABLET, DELAYED RELEASE ORAL at 21:38

## 2020-01-01 RX ADMIN — IPRATROPIUM BROMIDE AND ALBUTEROL SULFATE 3 ML: .5; 3 SOLUTION RESPIRATORY (INHALATION) at 08:54

## 2020-01-01 RX ADMIN — AMIODARONE HYDROCHLORIDE 200 MG: 200 TABLET ORAL at 09:44

## 2020-01-01 RX ADMIN — CALCIUM CITRATE 200 MG (950 MG) TABLET 200 MG: at 08:42

## 2020-01-01 RX ADMIN — SERTRALINE HYDROCHLORIDE 50 MG: 50 TABLET ORAL at 08:24

## 2020-01-01 RX ADMIN — VASOPRESSIN 0.04 UNITS/MIN: 20 INJECTION INTRAVENOUS at 09:45

## 2020-01-01 RX ADMIN — ASPIRIN 81 MG 81 MG: 81 TABLET ORAL at 09:43

## 2020-01-01 RX ADMIN — SENNOSIDES AND DOCUSATE SODIUM 2 TABLET: 8.6; 5 TABLET ORAL at 17:29

## 2020-01-01 RX ADMIN — MIDAZOLAM HYDROCHLORIDE 4 MG: 1 INJECTION, SOLUTION INTRAMUSCULAR; INTRAVENOUS at 12:32

## 2020-01-01 RX ADMIN — POTASSIUM CHLORIDE: 2 INJECTION, SOLUTION, CONCENTRATE INTRAVENOUS at 20:45

## 2020-01-01 RX ADMIN — INSULIN LISPRO 8 UNITS: 100 INJECTION, SOLUTION INTRAVENOUS; SUBCUTANEOUS at 17:32

## 2020-01-01 RX ADMIN — METHADONE HYDROCHLORIDE 10 MG: 10 CONCENTRATE ORAL at 08:37

## 2020-01-01 RX ADMIN — OXYCODONE HYDROCHLORIDE AND ACETAMINOPHEN 1 TABLET: 10; 325 TABLET ORAL at 03:18

## 2020-01-01 RX ADMIN — IPRATROPIUM BROMIDE AND ALBUTEROL SULFATE 3 ML: .5; 3 SOLUTION RESPIRATORY (INHALATION) at 20:12

## 2020-01-01 RX ADMIN — KETAMINE HYDROCHLORIDE 163 MG: 50 INJECTION, SOLUTION INTRAMUSCULAR; INTRAVENOUS at 09:53

## 2020-01-01 RX ADMIN — POTASSIUM BICARBONATE 40 MEQ: 782 TABLET, EFFERVESCENT ORAL at 13:15

## 2020-01-01 RX ADMIN — OXYCODONE AND ACETAMINOPHEN 1 TABLET: 5; 325 TABLET ORAL at 18:00

## 2020-01-01 RX ADMIN — SODIUM CHLORIDE, SODIUM ACETATE ANHYDROUS, SODIUM GLUCONATE, POTASSIUM CHLORIDE, AND MAGNESIUM CHLORIDE 75 ML: 526; 222; 502; 37; 30 INJECTION, SOLUTION INTRAVENOUS at 00:00

## 2020-01-01 RX ADMIN — INSULIN LISPRO 2 UNITS: 100 INJECTION, SOLUTION INTRAVENOUS; SUBCUTANEOUS at 00:00

## 2020-01-01 RX ADMIN — ALLOPURINOL 100 MG: 100 TABLET ORAL at 08:44

## 2020-01-01 RX ADMIN — IPRATROPIUM BROMIDE AND ALBUTEROL SULFATE 3 ML: .5; 3 SOLUTION RESPIRATORY (INHALATION) at 21:51

## 2020-01-01 RX ADMIN — POTASSIUM CHLORIDE: 2 INJECTION, SOLUTION, CONCENTRATE INTRAVENOUS at 21:01

## 2020-01-01 RX ADMIN — Medication 10 ML: at 09:00

## 2020-01-01 RX ADMIN — Medication 30 ML: at 08:45

## 2020-01-01 RX ADMIN — INSULIN GLARGINE 10 UNITS: 100 INJECTION, SOLUTION SUBCUTANEOUS at 09:22

## 2020-01-01 RX ADMIN — METHOCARBAMOL 500 MG: 500 TABLET, FILM COATED ORAL at 13:51

## 2020-01-01 RX ADMIN — IPRATROPIUM BROMIDE AND ALBUTEROL SULFATE 3 ML: .5; 3 SOLUTION RESPIRATORY (INHALATION) at 03:15

## 2020-01-01 RX ADMIN — HEPARIN SODIUM 5000 UNITS: 5000 INJECTION INTRAVENOUS; SUBCUTANEOUS at 17:14

## 2020-01-01 RX ADMIN — BUSPIRONE HYDROCHLORIDE 5 MG: 5 TABLET ORAL at 09:24

## 2020-01-01 RX ADMIN — MIDODRINE HYDROCHLORIDE 5 MG: 5 TABLET ORAL at 12:42

## 2020-01-01 RX ADMIN — PANTOPRAZOLE SODIUM 40 MG: 40 TABLET, DELAYED RELEASE ORAL at 15:56

## 2020-01-01 RX ADMIN — OXYCODONE HYDROCHLORIDE AND ACETAMINOPHEN 1 TABLET: 10; 325 TABLET ORAL at 14:53

## 2020-01-01 RX ADMIN — DIVALPROEX SODIUM 500 MG: 250 TABLET, DELAYED RELEASE ORAL at 22:10

## 2020-01-01 RX ADMIN — CHOLESTYRAMINE 4 G: 4 POWDER, FOR SUSPENSION ORAL at 17:24

## 2020-01-01 RX ADMIN — POTASSIUM CHLORIDE 10 MEQ: 10 INJECTION, SOLUTION INTRAVENOUS at 08:42

## 2020-01-01 RX ADMIN — PREDNISONE 30 MG: 5 TABLET ORAL at 08:28

## 2020-01-01 RX ADMIN — POTASSIUM CHLORIDE: 2 INJECTION, SOLUTION, CONCENTRATE INTRAVENOUS at 00:15

## 2020-01-01 RX ADMIN — FENTANYL CITRATE 50 MCG: 50 INJECTION INTRAMUSCULAR; INTRAVENOUS at 03:24

## 2020-01-01 RX ADMIN — PIPERACILLIN SODIUM AND TAZOBACTAM SODIUM 4.5 G: 4; .5 INJECTION, POWDER, LYOPHILIZED, FOR SOLUTION INTRAVENOUS at 18:26

## 2020-01-01 RX ADMIN — INSULIN LISPRO 4 UNITS: 100 INJECTION, SOLUTION INTRAVENOUS; SUBCUTANEOUS at 23:39

## 2020-01-01 RX ADMIN — DIVALPROEX SODIUM 500 MG: 250 TABLET, DELAYED RELEASE ORAL at 21:49

## 2020-01-01 RX ADMIN — METHOCARBAMOL 500 MG: 500 TABLET, FILM COATED ORAL at 21:49

## 2020-01-01 RX ADMIN — METHOCARBAMOL 500 MG: 500 TABLET, FILM COATED ORAL at 13:58

## 2020-01-01 RX ADMIN — CEFEPIME 2 G: 2 INJECTION, POWDER, FOR SOLUTION INTRAVENOUS at 10:41

## 2020-01-01 RX ADMIN — INSULIN LISPRO 6 UNITS: 100 INJECTION, SOLUTION INTRAVENOUS; SUBCUTANEOUS at 21:43

## 2020-01-01 RX ADMIN — ROFLUMILAST 250 MCG: 500 TABLET ORAL at 09:30

## 2020-01-01 RX ADMIN — OXYCODONE HYDROCHLORIDE AND ACETAMINOPHEN 1 TABLET: 10; 325 TABLET ORAL at 18:09

## 2020-01-01 RX ADMIN — MAGNESIUM SULFATE HEPTAHYDRATE 2 G: 40 INJECTION, SOLUTION INTRAVENOUS at 00:08

## 2020-01-01 RX ADMIN — CHLORHEXIDINE GLUCONATE 0.12% ORAL RINSE 10 ML: 1.2 LIQUID ORAL at 08:38

## 2020-01-01 RX ADMIN — METHOCARBAMOL 500 MG: 500 TABLET, FILM COATED ORAL at 21:26

## 2020-01-01 RX ADMIN — BUDESONIDE 500 MCG: 0.5 INHALANT RESPIRATORY (INHALATION) at 19:42

## 2020-01-01 RX ADMIN — BUDESONIDE 500 MCG: 0.5 INHALANT RESPIRATORY (INHALATION) at 08:00

## 2020-01-01 RX ADMIN — PANTOPRAZOLE SODIUM 40 MG: 40 TABLET, DELAYED RELEASE ORAL at 18:27

## 2020-01-01 RX ADMIN — PIPERACILLIN SODIUM AND TAZOBACTAM SODIUM 4.5 G: 4; .5 INJECTION, POWDER, LYOPHILIZED, FOR SOLUTION INTRAVENOUS at 06:03

## 2020-01-01 RX ADMIN — IPRATROPIUM BROMIDE AND ALBUTEROL SULFATE 3 ML: .5; 3 SOLUTION RESPIRATORY (INHALATION) at 19:43

## 2020-01-01 RX ADMIN — METOCLOPRAMIDE HYDROCHLORIDE 10 MG: 5 TABLET ORAL at 08:32

## 2020-01-01 RX ADMIN — INSULIN LISPRO 4 UNITS: 100 INJECTION, SOLUTION INTRAVENOUS; SUBCUTANEOUS at 17:32

## 2020-01-01 RX ADMIN — Medication 5 TABLET: at 08:52

## 2020-01-01 RX ADMIN — OXYCODONE AND ACETAMINOPHEN 1 TABLET: 5; 325 TABLET ORAL at 01:46

## 2020-01-01 RX ADMIN — ACETAMINOPHEN 325 MG: 325 TABLET ORAL at 08:57

## 2020-01-01 RX ADMIN — INSULIN GLARGINE 16 UNITS: 100 INJECTION, SOLUTION SUBCUTANEOUS at 08:35

## 2020-01-01 RX ADMIN — BUSPIRONE HYDROCHLORIDE 5 MG: 5 TABLET ORAL at 21:26

## 2020-01-01 RX ADMIN — FUROSEMIDE 20 MG: 10 INJECTION, SOLUTION INTRAMUSCULAR; INTRAVENOUS at 13:50

## 2020-01-01 RX ADMIN — OXYCODONE HYDROCHLORIDE AND ACETAMINOPHEN 1 TABLET: 10; 325 TABLET ORAL at 10:15

## 2020-01-01 RX ADMIN — ALBUTEROL SULFATE 1 PUFF: 90 AEROSOL, METERED RESPIRATORY (INHALATION) at 19:42

## 2020-01-01 RX ADMIN — CEFEPIME 2 G: 2 INJECTION, POWDER, FOR SOLUTION INTRAVENOUS at 23:50

## 2020-01-01 RX ADMIN — SPIRONOLACTONE 25 MG: 25 TABLET ORAL at 08:31

## 2020-01-01 RX ADMIN — LEVOTHYROXINE SODIUM 75 MCG: 25 TABLET ORAL at 05:52

## 2020-01-01 RX ADMIN — Medication 1 CAPSULE: at 08:40

## 2020-01-01 RX ADMIN — DOCUSATE SODIUM 100 MG: 100 CAPSULE, LIQUID FILLED ORAL at 09:24

## 2020-01-01 RX ADMIN — CALCIUM CITRATE 200 MG (950 MG) TABLET 200 MG: at 17:32

## 2020-01-01 RX ADMIN — IPRATROPIUM BROMIDE AND ALBUTEROL SULFATE 3 ML: .5; 3 SOLUTION RESPIRATORY (INHALATION) at 20:04

## 2020-01-01 RX ADMIN — DOCUSATE SODIUM 100 MG: 100 CAPSULE, LIQUID FILLED ORAL at 08:25

## 2020-01-01 RX ADMIN — FENTANYL CITRATE 50 MCG: 50 INJECTION INTRAMUSCULAR; INTRAVENOUS at 00:27

## 2020-01-01 RX ADMIN — HYDROCORTISONE SODIUM SUCCINATE 100 MG: 100 INJECTION, POWDER, FOR SOLUTION INTRAMUSCULAR; INTRAVENOUS at 12:09

## 2020-01-01 RX ADMIN — PANTOPRAZOLE SODIUM 40 MG: 40 TABLET, DELAYED RELEASE ORAL at 06:33

## 2020-01-01 RX ADMIN — PIPERACILLIN SODIUM AND TAZOBACTAM SODIUM 4.5 G: 4; .5 INJECTION, POWDER, LYOPHILIZED, FOR SOLUTION INTRAVENOUS at 05:45

## 2020-01-01 RX ADMIN — OXYBUTYNIN CHLORIDE 5 MG: 5 TABLET ORAL at 17:19

## 2020-01-01 RX ADMIN — AMIODARONE HYDROCHLORIDE 200 MG: 200 TABLET ORAL at 09:26

## 2020-01-01 RX ADMIN — Medication 10 ML: at 21:28

## 2020-01-01 RX ADMIN — AMIODARONE HYDROCHLORIDE 200 MG: 200 TABLET ORAL at 08:49

## 2020-01-01 RX ADMIN — POTASSIUM & SODIUM PHOSPHATES POWDER PACK 280-160-250 MG 1 PACKET: 280-160-250 PACK at 21:42

## 2020-01-01 RX ADMIN — Medication 10 ML: at 08:41

## 2020-01-01 RX ADMIN — ACETAMINOPHEN ORAL SOLUTION 650 MG: 650 SOLUTION ORAL at 16:35

## 2020-01-01 RX ADMIN — PANTOPRAZOLE SODIUM 40 MG: 40 TABLET, DELAYED RELEASE ORAL at 06:51

## 2020-01-01 RX ADMIN — AMIODARONE HYDROCHLORIDE 0.5 MG/MIN: 1.8 INJECTION, SOLUTION INTRAVENOUS at 00:04

## 2020-01-01 RX ADMIN — OXYCODONE HYDROCHLORIDE AND ACETAMINOPHEN 1 TABLET: 10; 325 TABLET ORAL at 18:42

## 2020-01-01 RX ADMIN — BUSPIRONE HYDROCHLORIDE 5 MG: 5 TABLET ORAL at 21:14

## 2020-01-01 RX ADMIN — ACETAMINOPHEN 325 MG: 325 TABLET ORAL at 12:38

## 2020-01-01 RX ADMIN — ALBUTEROL SULFATE 2.5 MG: 2.5 SOLUTION RESPIRATORY (INHALATION) at 11:27

## 2020-01-01 RX ADMIN — INSULIN LISPRO 4 UNITS: 100 INJECTION, SOLUTION INTRAVENOUS; SUBCUTANEOUS at 08:59

## 2020-01-01 RX ADMIN — METHADONE HYDROCHLORIDE 20 MG: 10 TABLET ORAL at 09:24

## 2020-01-01 RX ADMIN — CALCIUM CITRATE 200 MG (950 MG) TABLET 200 MG: at 18:30

## 2020-01-01 RX ADMIN — ALBUTEROL SULFATE 1 PUFF: 90 AEROSOL, METERED RESPIRATORY (INHALATION) at 04:45

## 2020-01-01 RX ADMIN — LEVOTHYROXINE SODIUM 100 MCG: 0.1 TABLET ORAL at 06:26

## 2020-01-01 RX ADMIN — BUDESONIDE 500 MCG: 0.5 INHALANT RESPIRATORY (INHALATION) at 08:04

## 2020-01-01 RX ADMIN — HEPARIN SODIUM 5000 UNITS: 5000 INJECTION INTRAVENOUS; SUBCUTANEOUS at 23:51

## 2020-01-01 RX ADMIN — OXYCODONE AND ACETAMINOPHEN 1 TABLET: 5; 325 TABLET ORAL at 00:34

## 2020-01-01 RX ADMIN — HEPARIN SODIUM 690 UNITS/HR: 10000 INJECTION, SOLUTION INTRAVENOUS at 14:23

## 2020-01-01 RX ADMIN — Medication 250 MG: at 08:36

## 2020-01-01 RX ADMIN — PANTOPRAZOLE SODIUM 40 MG: 40 TABLET, DELAYED RELEASE ORAL at 05:44

## 2020-01-01 RX ADMIN — SODIUM CHLORIDE, SODIUM ACETATE ANHYDROUS, SODIUM GLUCONATE, POTASSIUM CHLORIDE, AND MAGNESIUM CHLORIDE 2000 ML: 526; 222; 502; 37; 30 INJECTION, SOLUTION INTRAVENOUS at 16:00

## 2020-01-01 RX ADMIN — SERTRALINE HYDROCHLORIDE 50 MG: 50 TABLET ORAL at 08:37

## 2020-01-01 RX ADMIN — INSULIN LISPRO 4 UNITS: 100 INJECTION, SOLUTION INTRAVENOUS; SUBCUTANEOUS at 04:00

## 2020-01-01 RX ADMIN — PANTOPRAZOLE SODIUM 40 MG: 40 TABLET, DELAYED RELEASE ORAL at 08:37

## 2020-01-01 RX ADMIN — OXYCODONE AND ACETAMINOPHEN 1 TABLET: 5; 325 TABLET ORAL at 01:55

## 2020-01-01 RX ADMIN — AZTREONAM 1 G: 1 INJECTION, POWDER, LYOPHILIZED, FOR SOLUTION INTRAMUSCULAR; INTRAVENOUS at 00:50

## 2020-01-01 RX ADMIN — METHOCARBAMOL 500 MG: 500 TABLET, FILM COATED ORAL at 05:00

## 2020-01-01 RX ADMIN — AMIODARONE HYDROCHLORIDE 200 MG: 200 TABLET ORAL at 08:29

## 2020-01-01 RX ADMIN — CHLORHEXIDINE GLUCONATE 0.12% ORAL RINSE 10 ML: 1.2 LIQUID ORAL at 22:01

## 2020-01-01 RX ADMIN — ARIPIPRAZOLE 10 MG: 10 TABLET ORAL at 08:29

## 2020-01-01 RX ADMIN — OXYCODONE AND ACETAMINOPHEN 1 TABLET: 5; 325 TABLET ORAL at 13:56

## 2020-01-01 RX ADMIN — NYSTATIN 500000 UNITS: 500000 SUSPENSION ORAL at 15:44

## 2020-01-01 RX ADMIN — INSULIN LISPRO 2 UNITS: 100 INJECTION, SOLUTION INTRAVENOUS; SUBCUTANEOUS at 17:29

## 2020-01-01 RX ADMIN — ALLOPURINOL 100 MG: 100 TABLET ORAL at 08:32

## 2020-01-01 RX ADMIN — POTASSIUM PHOSPHATE, MONOBASIC AND POTASSIUM PHOSPHATE, DIBASIC: 224; 236 INJECTION, SOLUTION INTRAVENOUS at 02:56

## 2020-01-01 RX ADMIN — HEPARIN SODIUM 3000 UNITS: 1000 INJECTION, SOLUTION INTRAVENOUS; SUBCUTANEOUS at 18:22

## 2020-01-01 RX ADMIN — AMIODARONE HYDROCHLORIDE 200 MG: 200 TABLET ORAL at 13:49

## 2020-01-01 RX ADMIN — Medication 5 TABLET: at 08:42

## 2020-01-01 RX ADMIN — METHYLPREDNISOLONE SODIUM SUCCINATE 125 MG: 125 INJECTION, POWDER, FOR SOLUTION INTRAMUSCULAR; INTRAVENOUS at 11:13

## 2020-01-01 RX ADMIN — AMIODARONE HYDROCHLORIDE 200 MG: 200 TABLET ORAL at 18:55

## 2020-01-01 RX ADMIN — BUDESONIDE 500 MCG: 0.5 INHALANT RESPIRATORY (INHALATION) at 07:33

## 2020-01-01 RX ADMIN — CARVEDILOL 6.25 MG: 6.25 TABLET, FILM COATED ORAL at 12:16

## 2020-01-01 RX ADMIN — POTASSIUM CHLORIDE 20 MEQ: 14.9 INJECTION, SOLUTION INTRAVENOUS at 06:00

## 2020-01-01 RX ADMIN — METHADONE HYDROCHLORIDE 20 MG: 10 CONCENTRATE ORAL at 12:20

## 2020-01-01 RX ADMIN — PANTOPRAZOLE SODIUM 40 MG: 40 TABLET, DELAYED RELEASE ORAL at 06:21

## 2020-01-01 RX ADMIN — INSULIN GLARGINE 15 UNITS: 100 INJECTION, SOLUTION SUBCUTANEOUS at 20:47

## 2020-01-01 RX ADMIN — PROPOFOL 40 MCG/KG/MIN: 10 INJECTION, EMULSION INTRAVENOUS at 12:11

## 2020-01-01 RX ADMIN — FUROSEMIDE 20 MG: 20 TABLET ORAL at 09:26

## 2020-01-01 RX ADMIN — PIPERACILLIN AND TAZOBACTAM 3.38 G: 3; .375 INJECTION, POWDER, LYOPHILIZED, FOR SOLUTION INTRAVENOUS at 00:26

## 2020-01-01 RX ADMIN — OXYCODONE HYDROCHLORIDE AND ACETAMINOPHEN 1 TABLET: 10; 325 TABLET ORAL at 10:58

## 2020-01-01 RX ADMIN — BUDESONIDE 500 MCG: 0.5 INHALANT RESPIRATORY (INHALATION) at 19:17

## 2020-01-01 RX ADMIN — NOREPINEPHRINE BITARTRATE 12 MCG/MIN: 1 INJECTION INTRAVENOUS at 01:21

## 2020-01-01 RX ADMIN — AMIODARONE HYDROCHLORIDE 0.5 MG/MIN: 1.8 INJECTION, SOLUTION INTRAVENOUS at 00:31

## 2020-01-01 RX ADMIN — SERTRALINE HYDROCHLORIDE 50 MG: 50 TABLET ORAL at 09:42

## 2020-01-01 RX ADMIN — INSULIN GLARGINE 16 UNITS: 100 INJECTION, SOLUTION SUBCUTANEOUS at 09:00

## 2020-01-01 RX ADMIN — CEFEPIME HYDROCHLORIDE 2 G: 2 INJECTION, POWDER, FOR SOLUTION INTRAVENOUS at 20:15

## 2020-01-01 RX ADMIN — ALBUTEROL SULFATE 1 PUFF: 90 AEROSOL, METERED RESPIRATORY (INHALATION) at 00:00

## 2020-01-01 RX ADMIN — METHADONE HYDROCHLORIDE 20 MG: 10 TABLET ORAL at 08:38

## 2020-01-01 RX ADMIN — PREDNISONE 60 MG: 20 TABLET ORAL at 04:29

## 2020-01-01 RX ADMIN — METHOCARBAMOL 500 MG: 500 TABLET, FILM COATED ORAL at 15:02

## 2020-01-01 RX ADMIN — SODIUM CHLORIDE 448 ML: 900 INJECTION, SOLUTION INTRAVENOUS at 11:29

## 2020-01-01 RX ADMIN — DOCUSATE SODIUM 100 MG: 100 CAPSULE, LIQUID FILLED ORAL at 08:43

## 2020-01-01 RX ADMIN — PIPERACILLIN SODIUM AND TAZOBACTAM SODIUM 4.5 G: 4; .5 INJECTION, POWDER, LYOPHILIZED, FOR SOLUTION INTRAVENOUS at 00:07

## 2020-01-01 RX ADMIN — HYDROCORTISONE SODIUM SUCCINATE 50 MG: 100 INJECTION, POWDER, FOR SOLUTION INTRAMUSCULAR; INTRAVENOUS at 08:47

## 2020-01-01 RX ADMIN — SERTRALINE HYDROCHLORIDE 50 MG: 50 TABLET ORAL at 08:52

## 2020-01-01 RX ADMIN — CEFEPIME 2 G: 2 INJECTION, POWDER, FOR SOLUTION INTRAVENOUS at 15:31

## 2020-01-01 RX ADMIN — OXYBUTYNIN CHLORIDE 5 MG: 5 TABLET ORAL at 17:26

## 2020-01-01 RX ADMIN — CEFPODOXIME PROXETIL 200 MG: 100 TABLET, FILM COATED ORAL at 21:57

## 2020-01-01 RX ADMIN — SODIUM CHLORIDE 100 MG: 9 INJECTION, SOLUTION INTRAVENOUS at 18:08

## 2020-01-01 RX ADMIN — PIPERACILLIN SODIUM AND TAZOBACTAM SODIUM 4.5 G: 4; .5 INJECTION, POWDER, LYOPHILIZED, FOR SOLUTION INTRAVENOUS at 12:55

## 2020-01-01 RX ADMIN — PIPERACILLIN SODIUM AND TAZOBACTAM SODIUM 4.5 G: 4; .5 INJECTION, POWDER, LYOPHILIZED, FOR SOLUTION INTRAVENOUS at 18:12

## 2020-01-01 RX ADMIN — POTASSIUM CHLORIDE: 2 INJECTION, SOLUTION, CONCENTRATE INTRAVENOUS at 21:55

## 2020-01-01 RX ADMIN — METHOCARBAMOL 500 MG: 500 TABLET, FILM COATED ORAL at 06:21

## 2020-01-01 RX ADMIN — APIXABAN 5 MG: 5 TABLET, FILM COATED ORAL at 09:02

## 2020-01-01 RX ADMIN — PHENYLEPHRINE HYDROCHLORIDE 100 MCG/MIN: 10 INJECTION INTRAVENOUS at 17:35

## 2020-01-01 RX ADMIN — PIPERACILLIN SODIUM AND TAZOBACTAM SODIUM 4.5 G: 4; .5 INJECTION, POWDER, LYOPHILIZED, FOR SOLUTION INTRAVENOUS at 18:53

## 2020-01-01 RX ADMIN — BUSPIRONE HYDROCHLORIDE 5 MG: 5 TABLET ORAL at 17:13

## 2020-01-01 RX ADMIN — BUSPIRONE HYDROCHLORIDE 5 MG: 5 TABLET ORAL at 21:29

## 2020-01-01 RX ADMIN — PIPERACILLIN AND TAZOBACTAM 3.38 G: 3; .375 INJECTION, POWDER, LYOPHILIZED, FOR SOLUTION INTRAVENOUS at 06:14

## 2020-01-01 RX ADMIN — Medication 10 ML: at 14:20

## 2020-01-01 RX ADMIN — FLUTICASONE FUROATE AND VILANTEROL TRIFENATATE 1 PUFF: 100; 25 POWDER RESPIRATORY (INHALATION) at 08:11

## 2020-01-01 RX ADMIN — LEVOTHYROXINE SODIUM 100 MCG: 100 TABLET ORAL at 08:40

## 2020-01-01 RX ADMIN — NYSTATIN 500000 UNITS: 500000 SUSPENSION ORAL at 12:55

## 2020-01-01 RX ADMIN — DEXTROSE MONOHYDRATE 18 MCG/MIN: 5 INJECTION, SOLUTION INTRAVENOUS at 13:33

## 2020-01-01 RX ADMIN — POTASSIUM CHLORIDE 10 MEQ: 10 INJECTION, SOLUTION INTRAVENOUS at 10:19

## 2020-01-01 RX ADMIN — LUBIPROSTONE 24 MCG: 24 CAPSULE, GELATIN COATED ORAL at 10:04

## 2020-01-01 RX ADMIN — ROSUVASTATIN CALCIUM: 10 TABLET, COATED ORAL at 22:13

## 2020-01-01 RX ADMIN — ROSUVASTATIN CALCIUM 5 MG: 5 TABLET, COATED ORAL at 20:53

## 2020-01-01 RX ADMIN — INSULIN GLARGINE 25 UNITS: 100 INJECTION, SOLUTION SUBCUTANEOUS at 08:20

## 2020-01-01 RX ADMIN — APIXABAN 5 MG: 5 TABLET, FILM COATED ORAL at 22:09

## 2020-01-01 RX ADMIN — METHOCARBAMOL 500 MG: 500 TABLET, FILM COATED ORAL at 21:25

## 2020-01-01 RX ADMIN — BARIUM SULFATE 75 G: 960 POWDER, FOR SUSPENSION ORAL at 12:50

## 2020-01-01 RX ADMIN — ROCURONIUM BROMIDE 50 MG: 10 INJECTION, SOLUTION INTRAVENOUS at 16:46

## 2020-01-01 RX ADMIN — APIXABAN 5 MG: 5 TABLET, FILM COATED ORAL at 17:26

## 2020-01-01 RX ADMIN — METHOCARBAMOL 500 MG: 500 TABLET, FILM COATED ORAL at 14:33

## 2020-01-01 RX ADMIN — APIXABAN 5 MG: 5 TABLET, FILM COATED ORAL at 00:30

## 2020-01-01 RX ADMIN — ALBUTEROL SULFATE 1 PUFF: 90 AEROSOL, METERED RESPIRATORY (INHALATION) at 12:39

## 2020-01-01 RX ADMIN — IPRATROPIUM BROMIDE AND ALBUTEROL SULFATE 3 ML: .5; 3 SOLUTION RESPIRATORY (INHALATION) at 15:55

## 2020-01-01 RX ADMIN — HEPARIN SODIUM 5000 UNITS: 5000 INJECTION INTRAVENOUS; SUBCUTANEOUS at 14:06

## 2020-01-01 RX ADMIN — FERROUS SULFATE TAB 325 MG (65 MG ELEMENTAL FE) 325 MG: 325 (65 FE) TAB at 08:53

## 2020-01-01 RX ADMIN — LUBIPROSTONE 24 MCG: 24 CAPSULE, GELATIN COATED ORAL at 08:32

## 2020-01-01 RX ADMIN — ASPIRIN 81 MG CHEWABLE TABLET 81 MG: 81 TABLET CHEWABLE at 08:36

## 2020-01-01 RX ADMIN — CHLORHEXIDINE GLUCONATE 0.12% ORAL RINSE 10 ML: 1.2 LIQUID ORAL at 22:42

## 2020-01-01 RX ADMIN — HYDROCORTISONE SODIUM SUCCINATE 50 MG: 100 INJECTION, POWDER, FOR SOLUTION INTRAMUSCULAR; INTRAVENOUS at 08:45

## 2020-01-01 RX ADMIN — BUSPIRONE HYDROCHLORIDE 5 MG: 5 TABLET ORAL at 17:19

## 2020-01-01 RX ADMIN — SODIUM CHLORIDE, SODIUM ACETATE ANHYDROUS, SODIUM GLUCONATE, POTASSIUM CHLORIDE, AND MAGNESIUM CHLORIDE 1000 ML: 526; 222; 502; 37; 30 INJECTION, SOLUTION INTRAVENOUS at 09:10

## 2020-01-01 RX ADMIN — SODIUM CHLORIDE 100 MG: 9 INJECTION, SOLUTION INTRAVENOUS at 19:02

## 2020-01-01 RX ADMIN — INSULIN LISPRO 3 UNITS: 100 INJECTION, SOLUTION INTRAVENOUS; SUBCUTANEOUS at 12:24

## 2020-01-01 RX ADMIN — FENTANYL CITRATE 100 MCG: 50 INJECTION INTRAMUSCULAR; INTRAVENOUS at 08:45

## 2020-01-01 RX ADMIN — METHADONE HYDROCHLORIDE 20 MG: 10 TABLET ORAL at 09:31

## 2020-01-01 RX ADMIN — GUAIFENESIN 600 MG: 600 TABLET, EXTENDED RELEASE ORAL at 21:37

## 2020-01-01 RX ADMIN — FUROSEMIDE 20 MG: 20 TABLET ORAL at 08:18

## 2020-01-01 RX ADMIN — FUROSEMIDE 20 MG: 20 TABLET ORAL at 09:44

## 2020-01-01 RX ADMIN — POTASSIUM BICARBONATE 20 MEQ: 782 TABLET, EFFERVESCENT ORAL at 08:38

## 2020-01-01 RX ADMIN — GUAIFENESIN 600 MG: 600 TABLET, EXTENDED RELEASE ORAL at 08:29

## 2020-01-01 RX ADMIN — Medication 75 MCG/HR: at 22:33

## 2020-01-01 RX ADMIN — APIXABAN 5 MG: 5 TABLET, FILM COATED ORAL at 08:29

## 2020-01-01 RX ADMIN — HYDROCORTISONE SODIUM SUCCINATE 50 MG: 100 INJECTION, POWDER, FOR SOLUTION INTRAMUSCULAR; INTRAVENOUS at 17:51

## 2020-01-01 RX ADMIN — INSULIN LISPRO 9 UNITS: 100 INJECTION, SOLUTION INTRAVENOUS; SUBCUTANEOUS at 16:30

## 2020-01-01 RX ADMIN — ROSUVASTATIN CALCIUM 5 MG: 5 TABLET, COATED ORAL at 23:36

## 2020-01-01 RX ADMIN — GLYCOPYRROLATE AND FORMOTEROL FUMARATE 2 PUFF: 9; 4.8 AEROSOL, METERED RESPIRATORY (INHALATION) at 07:35

## 2020-01-01 RX ADMIN — PANTOPRAZOLE SODIUM 40 MG: 40 TABLET, DELAYED RELEASE ORAL at 08:49

## 2020-01-01 RX ADMIN — OXYCODONE HYDROCHLORIDE AND ACETAMINOPHEN 1 TABLET: 10; 325 TABLET ORAL at 21:51

## 2020-01-01 RX ADMIN — SODIUM CHLORIDE, SODIUM ACETATE ANHYDROUS, SODIUM GLUCONATE, POTASSIUM CHLORIDE, AND MAGNESIUM CHLORIDE: 526; 222; 502; 37; 30 INJECTION, SOLUTION INTRAVENOUS at 16:00

## 2020-01-01 RX ADMIN — SODIUM CHLORIDE 1000 MG: 900 INJECTION, SOLUTION INTRAVENOUS at 13:15

## 2020-01-01 RX ADMIN — FUROSEMIDE 20 MG: 20 TABLET ORAL at 08:49

## 2020-01-01 RX ADMIN — BUDESONIDE 500 MCG: 0.5 INHALANT RESPIRATORY (INHALATION) at 21:51

## 2020-01-01 RX ADMIN — APIXABAN 5 MG: 5 TABLET, FILM COATED ORAL at 08:31

## 2020-01-01 RX ADMIN — ROSUVASTATIN CALCIUM 5 MG: 10 TABLET, FILM COATED ORAL at 22:10

## 2020-01-01 RX ADMIN — OXYBUTYNIN CHLORIDE 5 MG: 5 TABLET ORAL at 17:29

## 2020-01-01 RX ADMIN — AMLODIPINE BESYLATE 5 MG: 5 TABLET ORAL at 09:44

## 2020-01-01 RX ADMIN — Medication 250 MG: at 08:39

## 2020-01-01 RX ADMIN — Medication 250 MG: at 08:42

## 2020-01-01 RX ADMIN — POTASSIUM BICARBONATE 20 MEQ: 782 TABLET, EFFERVESCENT ORAL at 16:00

## 2020-01-01 RX ADMIN — Medication 250 MG: at 10:05

## 2020-01-01 RX ADMIN — FLUTICASONE FUROATE AND VILANTEROL TRIFENATATE 1 PUFF: 100; 25 POWDER RESPIRATORY (INHALATION) at 08:27

## 2020-01-01 RX ADMIN — Medication 10 ML: at 16:04

## 2020-01-01 RX ADMIN — BUSPIRONE HYDROCHLORIDE 5 MG: 5 TABLET ORAL at 21:37

## 2020-01-01 RX ADMIN — APIXABAN 5 MG: 5 TABLET, FILM COATED ORAL at 22:11

## 2020-01-01 RX ADMIN — METHADONE HYDROCHLORIDE 10 MG: 10 CONCENTRATE ORAL at 15:03

## 2020-01-01 RX ADMIN — OXYCODONE AND ACETAMINOPHEN 1 TABLET: 5; 325 TABLET ORAL at 19:37

## 2020-01-01 RX ADMIN — OXYCODONE HYDROCHLORIDE AND ACETAMINOPHEN 1 TABLET: 10; 325 TABLET ORAL at 22:03

## 2020-01-01 RX ADMIN — LEVOTHYROXINE SODIUM 75 MCG: 25 TABLET ORAL at 06:51

## 2020-01-01 RX ADMIN — ALBUTEROL SULFATE 1 PUFF: 90 AEROSOL, METERED RESPIRATORY (INHALATION) at 12:57

## 2020-01-01 RX ADMIN — AMIODARONE HYDROCHLORIDE 200 MG: 200 TABLET ORAL at 09:58

## 2020-01-01 RX ADMIN — LEVOTHYROXINE SODIUM 100 MCG: 100 TABLET ORAL at 09:24

## 2020-01-01 RX ADMIN — Medication 200 MCG/HR: at 14:19

## 2020-01-01 RX ADMIN — OXYCODONE HYDROCHLORIDE AND ACETAMINOPHEN 1 TABLET: 10; 325 TABLET ORAL at 11:48

## 2020-01-01 RX ADMIN — Medication 10 ML: at 08:59

## 2020-01-01 RX ADMIN — ENOXAPARIN SODIUM 70 MG: 80 INJECTION SUBCUTANEOUS at 22:26

## 2020-01-01 RX ADMIN — ALBUTEROL SULFATE 1 PUFF: 90 AEROSOL, METERED RESPIRATORY (INHALATION) at 00:34

## 2020-01-01 RX ADMIN — DOCUSATE SODIUM 100 MG: 100 CAPSULE, LIQUID FILLED ORAL at 08:59

## 2020-01-01 RX ADMIN — ACETAMINOPHEN 650 MG: 10 INJECTION, SOLUTION INTRAVENOUS at 06:00

## 2020-01-01 RX ADMIN — DIVALPROEX SODIUM 500 MG: 250 TABLET, DELAYED RELEASE ORAL at 21:25

## 2020-01-01 RX ADMIN — AMIODARONE HYDROCHLORIDE 1 MG/MIN: 1.8 INJECTION, SOLUTION INTRAVENOUS at 19:01

## 2020-01-01 RX ADMIN — SODIUM CHLORIDE 1000 MG: 900 INJECTION, SOLUTION INTRAVENOUS at 12:00

## 2020-01-01 RX ADMIN — FENTANYL CITRATE 100 MCG: 50 INJECTION INTRAMUSCULAR; INTRAVENOUS at 06:35

## 2020-01-01 RX ADMIN — INSULIN LISPRO 9 UNITS: 100 INJECTION, SOLUTION INTRAVENOUS; SUBCUTANEOUS at 06:02

## 2020-01-01 RX ADMIN — HEPARIN SODIUM 3000 UNITS: 1000 INJECTION, SOLUTION INTRAVENOUS; SUBCUTANEOUS at 07:04

## 2020-01-01 RX ADMIN — FLUTICASONE FUROATE AND VILANTEROL TRIFENATATE 1 PUFF: 100; 25 POWDER RESPIRATORY (INHALATION) at 09:25

## 2020-01-01 RX ADMIN — ACETAMINOPHEN ORAL SOLUTION 650 MG: 650 SOLUTION ORAL at 17:30

## 2020-01-01 RX ADMIN — METHADONE HYDROCHLORIDE 20 MG: 10 TABLET ORAL at 09:10

## 2020-01-01 RX ADMIN — PANTOPRAZOLE SODIUM 40 MG: 40 TABLET, DELAYED RELEASE ORAL at 09:00

## 2020-01-01 RX ADMIN — AMIODARONE HYDROCHLORIDE 0.5 MG/MIN: 1.8 INJECTION, SOLUTION INTRAVENOUS at 03:36

## 2020-01-01 RX ADMIN — POTASSIUM BICARBONATE 20 MEQ: 782 TABLET, EFFERVESCENT ORAL at 09:29

## 2020-01-01 RX ADMIN — BUSPIRONE HYDROCHLORIDE 15 MG: 10 TABLET ORAL at 20:10

## 2020-01-01 RX ADMIN — ALBUMIN (HUMAN) 100 ML: 25 SOLUTION INTRAVENOUS at 13:57

## 2020-01-01 RX ADMIN — OXYBUTYNIN CHLORIDE 5 MG: 5 TABLET ORAL at 10:06

## 2020-01-01 RX ADMIN — INSULIN LISPRO 4 UNITS: 100 INJECTION, SOLUTION INTRAVENOUS; SUBCUTANEOUS at 11:12

## 2020-01-01 RX ADMIN — AMIODARONE HYDROCHLORIDE 200 MG: 200 TABLET ORAL at 08:37

## 2020-01-01 RX ADMIN — BUSPIRONE HYDROCHLORIDE 15 MG: 10 TABLET ORAL at 21:20

## 2020-01-01 RX ADMIN — OXYCODONE HYDROCHLORIDE AND ACETAMINOPHEN 1 TABLET: 10; 325 TABLET ORAL at 12:57

## 2020-01-01 RX ADMIN — VANCOMYCIN HYDROCHLORIDE 1500 MG: 10 INJECTION, POWDER, LYOPHILIZED, FOR SOLUTION INTRAVENOUS at 00:43

## 2020-01-01 RX ADMIN — APIXABAN 5 MG: 5 TABLET, FILM COATED ORAL at 18:54

## 2020-01-01 RX ADMIN — POTASSIUM BICARBONATE 40 MEQ: 782 TABLET, EFFERVESCENT ORAL at 09:18

## 2020-01-01 RX ADMIN — Medication 30 ML: at 12:00

## 2020-01-01 RX ADMIN — GUAIFENESIN 600 MG: 600 TABLET, EXTENDED RELEASE ORAL at 08:24

## 2020-01-01 RX ADMIN — BUDESONIDE 500 MCG: 0.5 INHALANT RESPIRATORY (INHALATION) at 20:28

## 2020-01-01 RX ADMIN — IPRATROPIUM BROMIDE AND ALBUTEROL SULFATE 3 ML: .5; 3 SOLUTION RESPIRATORY (INHALATION) at 23:13

## 2020-01-01 RX ADMIN — CEFEPIME 2 G: 2 INJECTION, POWDER, FOR SOLUTION INTRAVENOUS at 08:37

## 2020-01-01 RX ADMIN — GLYCOPYRROLATE AND FORMOTEROL FUMARATE 2 PUFF: 9; 4.8 AEROSOL, METERED RESPIRATORY (INHALATION) at 20:15

## 2020-01-01 RX ADMIN — HEPARIN SODIUM 5000 UNITS: 5000 INJECTION INTRAVENOUS; SUBCUTANEOUS at 22:22

## 2020-01-01 RX ADMIN — INSULIN LISPRO 2 UNITS: 100 INJECTION, SOLUTION INTRAVENOUS; SUBCUTANEOUS at 08:30

## 2020-01-01 RX ADMIN — MAGNESIUM SULFATE HEPTAHYDRATE 3 G: 500 INJECTION, SOLUTION INTRAMUSCULAR; INTRAVENOUS at 16:59

## 2020-01-01 RX ADMIN — OXYCODONE AND ACETAMINOPHEN 1 TABLET: 5; 325 TABLET ORAL at 21:20

## 2020-01-01 RX ADMIN — METHOCARBAMOL 500 MG: 500 TABLET, FILM COATED ORAL at 21:14

## 2020-01-01 RX ADMIN — PANTOPRAZOLE SODIUM 40 MG: 40 TABLET, DELAYED RELEASE ORAL at 16:43

## 2020-01-01 RX ADMIN — PANTOPRAZOLE SODIUM 40 MG: 40 TABLET, DELAYED RELEASE ORAL at 05:00

## 2020-01-01 RX ADMIN — NALOXONE HYDROCHLORIDE 1 MG: 1 INJECTION PARENTERAL at 18:38

## 2020-01-01 RX ADMIN — FUROSEMIDE 20 MG: 10 INJECTION, SOLUTION INTRAMUSCULAR; INTRAVENOUS at 11:00

## 2020-01-01 RX ADMIN — PANTOPRAZOLE SODIUM 40 MG: 40 TABLET, DELAYED RELEASE ORAL at 15:55

## 2020-01-01 RX ADMIN — GUAIFENESIN 600 MG: 600 TABLET, EXTENDED RELEASE ORAL at 09:24

## 2020-01-01 RX ADMIN — FENTANYL CITRATE 100 MCG: 50 INJECTION INTRAMUSCULAR; INTRAVENOUS at 23:15

## 2020-01-01 RX ADMIN — SERTRALINE HYDROCHLORIDE 50 MG: 50 TABLET ORAL at 08:43

## 2020-01-01 RX ADMIN — INSULIN LISPRO 2 UNITS: 100 INJECTION, SOLUTION INTRAVENOUS; SUBCUTANEOUS at 08:33

## 2020-01-01 RX ADMIN — FENTANYL CITRATE 100 MCG: 50 INJECTION, SOLUTION INTRAMUSCULAR; INTRAVENOUS at 11:01

## 2020-01-01 RX ADMIN — PIPERACILLIN SODIUM AND TAZOBACTAM SODIUM 4.5 G: 4; .5 INJECTION, POWDER, LYOPHILIZED, FOR SOLUTION INTRAVENOUS at 12:25

## 2020-01-01 RX ADMIN — PREDNISONE 30 MG: 5 TABLET ORAL at 08:32

## 2020-01-01 RX ADMIN — FAMOTIDINE 20 MG: 20 TABLET, FILM COATED ORAL at 18:08

## 2020-01-01 RX ADMIN — HYDROCORTISONE SODIUM SUCCINATE 50 MG: 100 INJECTION, POWDER, FOR SOLUTION INTRAMUSCULAR; INTRAVENOUS at 21:04

## 2020-01-01 RX ADMIN — AMIODARONE HYDROCHLORIDE 200 MG: 200 TABLET ORAL at 08:39

## 2020-01-01 RX ADMIN — SERTRALINE HYDROCHLORIDE 50 MG: 50 TABLET ORAL at 08:45

## 2020-01-01 RX ADMIN — HYDROCORTISONE SODIUM SUCCINATE 50 MG: 100 INJECTION, POWDER, FOR SOLUTION INTRAMUSCULAR; INTRAVENOUS at 11:14

## 2020-01-01 RX ADMIN — SERTRALINE HYDROCHLORIDE 50 MG: 50 TABLET ORAL at 08:14

## 2020-01-01 RX ADMIN — Medication 30 ML: at 09:58

## 2020-01-01 RX ADMIN — OXYBUTYNIN CHLORIDE 5 MG: 5 TABLET ORAL at 17:44

## 2020-01-01 RX ADMIN — INSULIN LISPRO 8 UNITS: 100 INJECTION, SOLUTION INTRAVENOUS; SUBCUTANEOUS at 21:24

## 2020-01-01 RX ADMIN — PANTOPRAZOLE SODIUM 40 MG: 40 INJECTION, POWDER, FOR SOLUTION INTRAVENOUS at 22:35

## 2020-01-01 RX ADMIN — MAGNESIUM SULFATE HEPTAHYDRATE 1 G: 1 INJECTION, SOLUTION INTRAVENOUS at 10:00

## 2020-01-01 RX ADMIN — CARVEDILOL 6.25 MG: 6.25 TABLET, FILM COATED ORAL at 09:26

## 2020-01-01 RX ADMIN — BUDESONIDE 500 MCG: 0.5 INHALANT RESPIRATORY (INHALATION) at 20:04

## 2020-01-01 RX ADMIN — BUDESONIDE 500 MCG: 0.5 INHALANT RESPIRATORY (INHALATION) at 19:13

## 2020-01-01 RX ADMIN — OXYCODONE AND ACETAMINOPHEN 1 TABLET: 5; 325 TABLET ORAL at 21:11

## 2020-01-01 RX ADMIN — IPRATROPIUM BROMIDE AND ALBUTEROL SULFATE 3 ML: .5; 3 SOLUTION RESPIRATORY (INHALATION) at 10:49

## 2020-01-01 RX ADMIN — ROCURONIUM BROMIDE 49 MG: 50 INJECTION, SOLUTION INTRAVENOUS at 09:54

## 2020-01-01 RX ADMIN — PIPERACILLIN SODIUM AND TAZOBACTAM SODIUM 4.5 G: 4; .5 INJECTION, POWDER, LYOPHILIZED, FOR SOLUTION INTRAVENOUS at 05:41

## 2020-01-01 RX ADMIN — INSULIN GLARGINE 6 UNITS: 100 INJECTION, SOLUTION SUBCUTANEOUS at 10:21

## 2020-01-01 RX ADMIN — IPRATROPIUM BROMIDE AND ALBUTEROL SULFATE 3 ML: .5; 3 SOLUTION RESPIRATORY (INHALATION) at 09:05

## 2020-01-01 RX ADMIN — INSULIN LISPRO 4 UNITS: 100 INJECTION, SOLUTION INTRAVENOUS; SUBCUTANEOUS at 16:01

## 2020-01-01 RX ADMIN — INSULIN LISPRO 4 UNITS: 100 INJECTION, SOLUTION INTRAVENOUS; SUBCUTANEOUS at 12:36

## 2020-01-01 RX ADMIN — SODIUM CHLORIDE 1000 MG: 900 INJECTION, SOLUTION INTRAVENOUS at 03:32

## 2020-01-01 RX ADMIN — CEFEPIME 1 G: 1 INJECTION, POWDER, FOR SOLUTION INTRAMUSCULAR; INTRAVENOUS at 11:07

## 2020-01-01 RX ADMIN — ASPIRIN 81 MG CHEWABLE TABLET 81 MG: 81 TABLET CHEWABLE at 09:30

## 2020-01-01 RX ADMIN — PIPERACILLIN SODIUM AND TAZOBACTAM SODIUM 4.5 G: 4; .5 INJECTION, POWDER, LYOPHILIZED, FOR SOLUTION INTRAVENOUS at 17:49

## 2020-01-01 RX ADMIN — PIPERACILLIN SODIUM AND TAZOBACTAM SODIUM 4.5 G: 4; .5 INJECTION, POWDER, LYOPHILIZED, FOR SOLUTION INTRAVENOUS at 12:29

## 2020-01-01 RX ADMIN — BARIUM SULFATE 30 ML: 400 PASTE ORAL at 12:50

## 2020-01-01 RX ADMIN — LEVOTHYROXINE SODIUM 100 MCG: 100 TABLET ORAL at 06:42

## 2020-01-01 RX ADMIN — OXYCODONE AND ACETAMINOPHEN 1 TABLET: 5; 325 TABLET ORAL at 12:03

## 2020-01-01 RX ADMIN — POTASSIUM CHLORIDE 10 MEQ: 10 INJECTION, SOLUTION INTRAVENOUS at 00:49

## 2020-01-01 RX ADMIN — BUDESONIDE 500 MCG: 0.5 INHALANT RESPIRATORY (INHALATION) at 22:53

## 2020-01-01 RX ADMIN — BUSPIRONE HYDROCHLORIDE 5 MG: 5 TABLET ORAL at 08:42

## 2020-01-01 RX ADMIN — SERTRALINE HYDROCHLORIDE 50 MG: 50 TABLET ORAL at 08:28

## 2020-01-01 RX ADMIN — Medication 10 ML: at 22:00

## 2020-01-01 RX ADMIN — CALCIUM GLUCONATE 2 G: 98 INJECTION, SOLUTION INTRAVENOUS at 14:37

## 2020-01-01 RX ADMIN — FLUCONAZOLE 400 MG: 400 INJECTION, SOLUTION INTRAVENOUS at 17:00

## 2020-01-01 RX ADMIN — ROFLUMILAST 250 MCG: 500 TABLET ORAL at 09:00

## 2020-01-01 RX ADMIN — CALCIUM CITRATE 200 MG (950 MG) TABLET 200 MG: at 08:13

## 2020-01-01 RX ADMIN — INSULIN LISPRO 3 UNITS: 100 INJECTION, SOLUTION INTRAVENOUS; SUBCUTANEOUS at 12:04

## 2020-01-01 RX ADMIN — CALCIUM CITRATE 200 MG (950 MG) TABLET 200 MG: at 08:29

## 2020-01-01 RX ADMIN — ACETAMINOPHEN 325 MG: 325 TABLET ORAL at 13:11

## 2020-01-01 RX ADMIN — CEFEPIME 2 G: 2 INJECTION, POWDER, FOR SOLUTION INTRAVENOUS at 07:04

## 2020-01-01 RX ADMIN — LUBIPROSTONE 24 MCG: 24 CAPSULE, GELATIN COATED ORAL at 09:43

## 2020-01-01 RX ADMIN — OXYBUTYNIN CHLORIDE 5 MG: 5 TABLET ORAL at 17:32

## 2020-01-01 RX ADMIN — INSULIN LISPRO 4 UNITS: 100 INJECTION, SOLUTION INTRAVENOUS; SUBCUTANEOUS at 12:45

## 2020-01-01 RX ADMIN — Medication 10 ML: at 20:53

## 2020-01-01 RX ADMIN — INSULIN GLARGINE 25 UNITS: 100 INJECTION, SOLUTION SUBCUTANEOUS at 09:25

## 2020-01-01 RX ADMIN — DOCUSATE SODIUM 100 MG: 100 CAPSULE, LIQUID FILLED ORAL at 08:52

## 2020-01-01 RX ADMIN — INSULIN LISPRO 8 UNITS: 100 INJECTION, SOLUTION INTRAVENOUS; SUBCUTANEOUS at 22:34

## 2020-01-01 RX ADMIN — PANTOPRAZOLE SODIUM 40 MG: 40 TABLET, DELAYED RELEASE ORAL at 05:52

## 2020-01-01 RX ADMIN — CALCIUM CARBONATE (ANTACID) CHEW TAB 500 MG 200 MG: 500 CHEW TAB at 05:33

## 2020-01-01 RX ADMIN — PROPOFOL 50 MCG/KG/MIN: 10 INJECTION, EMULSION INTRAVENOUS at 14:41

## 2020-01-01 RX ADMIN — FERROUS SULFATE TAB 325 MG (65 MG ELEMENTAL FE) 325 MG: 325 (65 FE) TAB at 08:15

## 2020-01-01 RX ADMIN — INSULIN LISPRO 2 UNITS: 100 INJECTION, SOLUTION INTRAVENOUS; SUBCUTANEOUS at 12:45

## 2020-01-01 RX ADMIN — HYDROCORTISONE SODIUM SUCCINATE 50 MG: 100 INJECTION, POWDER, FOR SOLUTION INTRAMUSCULAR; INTRAVENOUS at 11:06

## 2020-01-01 RX ADMIN — LEVOTHYROXINE SODIUM 100 MCG: 100 TABLET ORAL at 09:21

## 2020-01-01 RX ADMIN — PIPERACILLIN SODIUM AND TAZOBACTAM SODIUM 4.5 G: 4; .5 INJECTION, POWDER, LYOPHILIZED, FOR SOLUTION INTRAVENOUS at 23:39

## 2020-01-01 RX ADMIN — Medication 5 TABLET: at 08:44

## 2020-01-01 RX ADMIN — METHADONE HYDROCHLORIDE 20 MG: 10 CONCENTRATE ORAL at 14:15

## 2020-01-01 RX ADMIN — BUSPIRONE HYDROCHLORIDE 5 MG: 5 TABLET ORAL at 08:27

## 2020-01-01 RX ADMIN — SODIUM CHLORIDE 295 ML: 900 INJECTION, SOLUTION INTRAVENOUS at 22:38

## 2020-01-01 RX ADMIN — ALBUTEROL SULFATE 2.5 MG: 2.5 SOLUTION RESPIRATORY (INHALATION) at 13:39

## 2020-01-01 RX ADMIN — BUSPIRONE HYDROCHLORIDE 5 MG: 5 TABLET ORAL at 08:24

## 2020-01-01 RX ADMIN — PREDNISONE 30 MG: 10 TABLET ORAL at 09:26

## 2020-01-01 RX ADMIN — AMIODARONE HYDROCHLORIDE 200 MG: 200 TABLET ORAL at 09:30

## 2020-01-01 RX ADMIN — POTASSIUM CHLORIDE: 2 INJECTION, SOLUTION, CONCENTRATE INTRAVENOUS at 21:19

## 2020-01-01 RX ADMIN — PIPERACILLIN AND TAZOBACTAM 3.38 G: 3; .375 INJECTION, POWDER, LYOPHILIZED, FOR SOLUTION INTRAVENOUS at 19:02

## 2020-01-01 RX ADMIN — AMLODIPINE BESYLATE 5 MG: 5 TABLET ORAL at 08:29

## 2020-01-01 RX ADMIN — GUAIFENESIN 600 MG: 600 TABLET, EXTENDED RELEASE ORAL at 09:43

## 2020-01-01 RX ADMIN — APIXABAN 5 MG: 5 TABLET, FILM COATED ORAL at 19:04

## 2020-01-01 RX ADMIN — OXYCODONE AND ACETAMINOPHEN 1 TABLET: 5; 325 TABLET ORAL at 18:07

## 2020-01-01 RX ADMIN — CALCIUM CITRATE 200 MG (950 MG) TABLET 200 MG: at 17:04

## 2020-01-01 RX ADMIN — FERROUS SULFATE TAB 325 MG (65 MG ELEMENTAL FE) 325 MG: 325 (65 FE) TAB at 08:59

## 2020-01-01 RX ADMIN — POTASSIUM CHLORIDE 40 MEQ: 1500 TABLET, EXTENDED RELEASE ORAL at 12:52

## 2020-01-01 RX ADMIN — OXYBUTYNIN CHLORIDE 5 MG: 5 TABLET ORAL at 08:42

## 2020-01-01 RX ADMIN — SERTRALINE HYDROCHLORIDE 50 MG: 50 TABLET ORAL at 12:15

## 2020-01-01 RX ADMIN — CHLORHEXIDINE GLUCONATE 0.12% ORAL RINSE 10 ML: 1.2 LIQUID ORAL at 22:22

## 2020-01-01 RX ADMIN — Medication 250 MG: at 08:29

## 2020-01-01 RX ADMIN — SODIUM CHLORIDE 1000 MG: 900 INJECTION, SOLUTION INTRAVENOUS at 15:32

## 2020-01-01 RX ADMIN — ALLOPURINOL 100 MG: 100 TABLET ORAL at 08:59

## 2020-01-01 RX ADMIN — CEFEPIME 1 G: 1 INJECTION, POWDER, FOR SOLUTION INTRAMUSCULAR; INTRAVENOUS at 12:25

## 2020-01-01 RX ADMIN — OXYBUTYNIN CHLORIDE 5 MG: 5 TABLET ORAL at 17:04

## 2020-01-01 RX ADMIN — HYDROCORTISONE SODIUM SUCCINATE 50 MG: 100 INJECTION, POWDER, FOR SOLUTION INTRAMUSCULAR; INTRAVENOUS at 18:00

## 2020-01-01 RX ADMIN — LEVOTHYROXINE SODIUM 100 MCG: 0.1 TABLET ORAL at 05:46

## 2020-01-01 RX ADMIN — POTASSIUM BICARBONATE 40 MEQ: 782 TABLET, EFFERVESCENT ORAL at 07:00

## 2020-01-01 RX ADMIN — SPIRONOLACTONE 25 MG: 25 TABLET ORAL at 09:02

## 2020-01-01 RX ADMIN — INSULIN GLARGINE 25 UNITS: 100 INJECTION, SOLUTION SUBCUTANEOUS at 08:51

## 2020-01-01 RX ADMIN — INSULIN LISPRO 3 UNITS: 100 INJECTION, SOLUTION INTRAVENOUS; SUBCUTANEOUS at 18:42

## 2020-01-01 RX ADMIN — OXYBUTYNIN CHLORIDE 5 MG: 5 TABLET ORAL at 19:27

## 2020-01-01 RX ADMIN — POTASSIUM CHLORIDE: 2 INJECTION, SOLUTION, CONCENTRATE INTRAVENOUS at 13:19

## 2020-01-01 RX ADMIN — HEPARIN SODIUM 890 UNITS/HR: 10000 INJECTION, SOLUTION INTRAVENOUS at 15:53

## 2020-01-01 RX ADMIN — AMIODARONE HYDROCHLORIDE 200 MG: 200 TABLET ORAL at 17:26

## 2020-01-01 RX ADMIN — LEVOTHYROXINE SODIUM 100 MCG: 0.1 TABLET ORAL at 06:48

## 2020-01-01 RX ADMIN — INSULIN GLARGINE 10 UNITS: 100 INJECTION, SOLUTION SUBCUTANEOUS at 09:23

## 2020-01-01 RX ADMIN — IPRATROPIUM BROMIDE AND ALBUTEROL SULFATE 3 ML: .5; 3 SOLUTION RESPIRATORY (INHALATION) at 12:50

## 2020-01-01 RX ADMIN — LEVOTHYROXINE SODIUM 100 MCG: 0.1 TABLET ORAL at 05:23

## 2020-01-01 RX ADMIN — CALCIUM CARBONATE (ANTACID) CHEW TAB 500 MG 200 MG: 500 CHEW TAB at 10:40

## 2020-01-01 RX ADMIN — ASPIRIN 81 MG 81 MG: 81 TABLET ORAL at 08:47

## 2020-01-01 RX ADMIN — INSULIN LISPRO 8 UNITS: 100 INJECTION, SOLUTION INTRAVENOUS; SUBCUTANEOUS at 17:52

## 2020-01-01 RX ADMIN — MAGNESIUM SULFATE HEPTAHYDRATE 1 G: 1 INJECTION, SOLUTION INTRAVENOUS at 18:13

## 2020-01-01 RX ADMIN — AZTREONAM 2 G: 2 INJECTION, POWDER, LYOPHILIZED, FOR SOLUTION INTRAMUSCULAR; INTRAVENOUS at 09:30

## 2020-01-01 RX ADMIN — HEPARIN SODIUM 5000 UNITS: 5000 INJECTION INTRAVENOUS; SUBCUTANEOUS at 06:15

## 2020-01-01 RX ADMIN — ROSUVASTATIN CALCIUM 5 MG: 5 TABLET, COATED ORAL at 22:25

## 2020-01-01 RX ADMIN — SODIUM CHLORIDE, SODIUM ACETATE ANHYDROUS, SODIUM GLUCONATE, POTASSIUM CHLORIDE, AND MAGNESIUM CHLORIDE: 526; 222; 502; 37; 30 INJECTION, SOLUTION INTRAVENOUS at 15:10

## 2020-01-01 RX ADMIN — PIPERACILLIN SODIUM AND TAZOBACTAM SODIUM 4.5 G: 4; .5 INJECTION, POWDER, LYOPHILIZED, FOR SOLUTION INTRAVENOUS at 18:00

## 2020-01-01 RX ADMIN — ARIPIPRAZOLE 10 MG: 10 TABLET ORAL at 18:00

## 2020-01-01 RX ADMIN — BUDESONIDE 500 MCG: 0.5 INHALANT RESPIRATORY (INHALATION) at 19:51

## 2020-01-01 RX ADMIN — OXYCODONE HYDROCHLORIDE AND ACETAMINOPHEN 1 TABLET: 10; 325 TABLET ORAL at 03:05

## 2020-01-01 RX ADMIN — INSULIN LISPRO 4 UNITS: 100 INJECTION, SOLUTION INTRAVENOUS; SUBCUTANEOUS at 12:07

## 2020-01-01 RX ADMIN — LEVOTHYROXINE SODIUM 75 MCG: 25 TABLET ORAL at 06:04

## 2020-01-01 RX ADMIN — METRONIDAZOLE 500 MG: 500 INJECTION, SOLUTION INTRAVENOUS at 15:44

## 2020-01-01 RX ADMIN — Medication 200 MCG/HR: at 22:59

## 2020-01-01 RX ADMIN — METHOCARBAMOL 500 MG: 500 TABLET, FILM COATED ORAL at 21:29

## 2020-01-01 RX ADMIN — OXYCODONE HYDROCHLORIDE AND ACETAMINOPHEN 1 TABLET: 10; 325 TABLET ORAL at 22:23

## 2020-01-01 RX ADMIN — METHADONE HYDROCHLORIDE 20 MG: 10 TABLET ORAL at 10:07

## 2020-01-01 RX ADMIN — SODIUM CHLORIDE 1000 MG: 900 INJECTION, SOLUTION INTRAVENOUS at 08:07

## 2020-01-01 RX ADMIN — FERROUS SULFATE TAB 325 MG (65 MG ELEMENTAL FE) 325 MG: 325 (65 FE) TAB at 08:45

## 2020-01-01 RX ADMIN — METHADONE HYDROCHLORIDE 20 MG: 10 TABLET ORAL at 08:47

## 2020-01-01 RX ADMIN — INSULIN GLARGINE 15 UNITS: 100 INJECTION, SOLUTION SUBCUTANEOUS at 22:33

## 2020-01-01 RX ADMIN — INSULIN LISPRO 6 UNITS: 100 INJECTION, SOLUTION INTRAVENOUS; SUBCUTANEOUS at 13:14

## 2020-01-01 RX ADMIN — OXYCODONE HYDROCHLORIDE 5 MG: 5 TABLET ORAL at 13:44

## 2020-01-01 RX ADMIN — Medication 250 MG: at 08:00

## 2020-01-01 RX ADMIN — OXYCODONE HYDROCHLORIDE AND ACETAMINOPHEN 1 TABLET: 10; 325 TABLET ORAL at 02:34

## 2020-01-01 RX ADMIN — BUSPIRONE HYDROCHLORIDE 5 MG: 5 TABLET ORAL at 21:51

## 2020-01-01 RX ADMIN — IPRATROPIUM BROMIDE AND ALBUTEROL SULFATE 3 ML: .5; 3 SOLUTION RESPIRATORY (INHALATION) at 17:01

## 2020-01-01 RX ADMIN — BUDESONIDE 500 MCG: 0.5 INHALANT RESPIRATORY (INHALATION) at 07:44

## 2020-01-01 RX ADMIN — POTASSIUM CHLORIDE 10 MEQ: 10 INJECTION, SOLUTION INTRAVENOUS at 01:35

## 2020-01-01 RX ADMIN — PANTOPRAZOLE SODIUM 40 MG: 40 TABLET, DELAYED RELEASE ORAL at 06:52

## 2020-01-01 RX ADMIN — SENNOSIDES AND DOCUSATE SODIUM 2 TABLET: 8.6; 5 TABLET ORAL at 17:19

## 2020-01-01 RX ADMIN — GLYCOPYRROLATE AND FORMOTEROL FUMARATE 2 PUFF: 9; 4.8 AEROSOL, METERED RESPIRATORY (INHALATION) at 18:00

## 2020-01-01 RX ADMIN — BUDESONIDE 500 MCG: 0.5 INHALANT RESPIRATORY (INHALATION) at 07:28

## 2020-01-01 RX ADMIN — INSULIN LISPRO 6 UNITS: 100 INJECTION, SOLUTION INTRAVENOUS; SUBCUTANEOUS at 17:46

## 2020-01-01 RX ADMIN — BUDESONIDE 500 MCG: 0.5 INHALANT RESPIRATORY (INHALATION) at 20:46

## 2020-01-01 RX ADMIN — CEFEPIME 2 G: 2 INJECTION, POWDER, FOR SOLUTION INTRAVENOUS at 21:42

## 2020-01-01 RX ADMIN — AMIODARONE HYDROCHLORIDE 200 MG: 200 TABLET ORAL at 18:12

## 2020-01-01 RX ADMIN — OXYBUTYNIN CHLORIDE 5 MG: 5 TABLET ORAL at 08:53

## 2020-01-01 RX ADMIN — Medication 250 MG: at 08:30

## 2020-01-01 RX ADMIN — DOCUSATE SODIUM 100 MG: 100 CAPSULE, LIQUID FILLED ORAL at 10:07

## 2020-01-01 RX ADMIN — FERROUS SULFATE TAB 325 MG (65 MG ELEMENTAL FE) 325 MG: 325 (65 FE) TAB at 08:30

## 2020-01-01 RX ADMIN — FUROSEMIDE 20 MG: 10 INJECTION, SOLUTION INTRAMUSCULAR; INTRAVENOUS at 09:31

## 2020-01-01 RX ADMIN — Medication 250 MG: at 08:38

## 2020-01-01 RX ADMIN — ARIPIPRAZOLE 10 MG: 10 TABLET ORAL at 09:02

## 2020-01-01 RX ADMIN — FERROUS SULFATE TAB 325 MG (65 MG ELEMENTAL FE) 325 MG: 325 (65 FE) TAB at 09:25

## 2020-01-01 RX ADMIN — INSULIN LISPRO 4 UNITS: 100 INJECTION, SOLUTION INTRAVENOUS; SUBCUTANEOUS at 17:21

## 2020-01-01 RX ADMIN — METHOCARBAMOL 500 MG: 500 TABLET, FILM COATED ORAL at 06:05

## 2020-01-01 RX ADMIN — BUSPIRONE HYDROCHLORIDE 5 MG: 5 TABLET ORAL at 21:49

## 2020-01-01 RX ADMIN — INSULIN GLARGINE 25 UNITS: 100 INJECTION, SOLUTION SUBCUTANEOUS at 08:29

## 2020-01-01 RX ADMIN — POTASSIUM BICARBONATE 40 MEQ: 782 TABLET, EFFERVESCENT ORAL at 08:19

## 2020-01-01 RX ADMIN — POTASSIUM & SODIUM PHOSPHATES POWDER PACK 280-160-250 MG 1 PACKET: 280-160-250 PACK at 18:55

## 2020-01-01 RX ADMIN — IPRATROPIUM BROMIDE AND ALBUTEROL SULFATE 3 ML: .5; 3 SOLUTION RESPIRATORY (INHALATION) at 10:08

## 2020-01-01 RX ADMIN — PANTOPRAZOLE SODIUM 40 MG: 40 TABLET, DELAYED RELEASE ORAL at 09:42

## 2020-01-01 RX ADMIN — Medication 250 MG: at 12:13

## 2020-01-01 RX ADMIN — CHLORHEXIDINE GLUCONATE 0.12% ORAL RINSE 10 ML: 1.2 LIQUID ORAL at 09:07

## 2020-01-01 RX ADMIN — FAMOTIDINE 20 MG: 20 TABLET, FILM COATED ORAL at 09:10

## 2020-01-01 RX ADMIN — INSULIN LISPRO 4 UNITS: 100 INJECTION, SOLUTION INTRAVENOUS; SUBCUTANEOUS at 08:22

## 2020-01-01 RX ADMIN — INSULIN GLARGINE 15 UNITS: 100 INJECTION, SOLUTION SUBCUTANEOUS at 21:58

## 2020-01-01 RX ADMIN — LEVOTHYROXINE SODIUM ANHYDROUS 75 MCG: 100 INJECTION, POWDER, LYOPHILIZED, FOR SOLUTION INTRAVENOUS at 08:47

## 2020-01-01 RX ADMIN — Medication 250 MG: at 08:25

## 2020-01-01 RX ADMIN — INSULIN LISPRO 8 UNITS: 100 INJECTION, SOLUTION INTRAVENOUS; SUBCUTANEOUS at 17:30

## 2020-01-01 RX ADMIN — HYDROCORTISONE SODIUM SUCCINATE 50 MG: 100 INJECTION, POWDER, FOR SOLUTION INTRAMUSCULAR; INTRAVENOUS at 03:59

## 2020-01-01 RX ADMIN — FLUTICASONE FUROATE AND VILANTEROL TRIFENATATE 1 PUFF: 100; 25 POWDER RESPIRATORY (INHALATION) at 10:10

## 2020-01-01 RX ADMIN — IPRATROPIUM BROMIDE AND ALBUTEROL SULFATE 3 ML: .5; 3 SOLUTION RESPIRATORY (INHALATION) at 12:27

## 2020-01-01 RX ADMIN — IPRATROPIUM BROMIDE AND ALBUTEROL SULFATE 3 ML: .5; 3 SOLUTION RESPIRATORY (INHALATION) at 21:54

## 2020-01-01 RX ADMIN — ALLOPURINOL 100 MG: 100 TABLET ORAL at 08:47

## 2020-01-01 RX ADMIN — INSULIN GLARGINE 10 UNITS: 100 INJECTION, SOLUTION SUBCUTANEOUS at 08:47

## 2020-01-01 RX ADMIN — DIVALPROEX SODIUM 500 MG: 250 TABLET, DELAYED RELEASE ORAL at 21:14

## 2020-01-01 RX ADMIN — BUSPIRONE HYDROCHLORIDE 15 MG: 10 TABLET ORAL at 22:39

## 2020-01-01 RX ADMIN — INSULIN LISPRO 6 UNITS: 100 INJECTION, SOLUTION INTRAVENOUS; SUBCUTANEOUS at 12:27

## 2020-01-01 RX ADMIN — Medication 5 TABLET: at 10:03

## 2020-01-01 RX ADMIN — PANTOPRAZOLE SODIUM 40 MG: 40 TABLET, DELAYED RELEASE ORAL at 17:07

## 2020-01-01 RX ADMIN — GUAIFENESIN 600 MG: 600 TABLET, EXTENDED RELEASE ORAL at 21:50

## 2020-01-01 RX ADMIN — DOXYCYCLINE HYCLATE 100 MG: 100 CAPSULE ORAL at 22:09

## 2020-01-01 RX ADMIN — ROSUVASTATIN CALCIUM 20 MG: 20 TABLET, COATED ORAL at 22:57

## 2020-01-01 RX ADMIN — INFLUENZA VIRUS VACCINE 0.5 ML: 15; 15; 15; 15 SUSPENSION INTRAMUSCULAR at 12:00

## 2020-01-01 RX ADMIN — BUSPIRONE HYDROCHLORIDE 15 MG: 10 TABLET ORAL at 09:44

## 2020-01-01 RX ADMIN — GLYCOPYRROLATE AND FORMOTEROL FUMARATE 2 PUFF: 9; 4.8 AEROSOL, METERED RESPIRATORY (INHALATION) at 22:25

## 2020-01-01 RX ADMIN — SERTRALINE HYDROCHLORIDE 50 MG: 50 TABLET ORAL at 08:09

## 2020-01-01 RX ADMIN — LORAZEPAM 0.5 MG: 2 INJECTION INTRAMUSCULAR; INTRAVENOUS at 12:45

## 2020-01-01 RX ADMIN — HYDROCORTISONE SODIUM SUCCINATE 100 MG: 100 INJECTION, POWDER, FOR SOLUTION INTRAMUSCULAR; INTRAVENOUS at 20:45

## 2020-01-01 RX ADMIN — IPRATROPIUM BROMIDE AND ALBUTEROL SULFATE 3 ML: .5; 3 SOLUTION RESPIRATORY (INHALATION) at 07:36

## 2020-01-01 RX ADMIN — PIPERACILLIN SODIUM AND TAZOBACTAM SODIUM 4.5 G: 4; .5 INJECTION, POWDER, LYOPHILIZED, FOR SOLUTION INTRAVENOUS at 05:14

## 2020-01-01 RX ADMIN — Medication 75 MCG/HR: at 23:27

## 2020-01-01 RX ADMIN — SODIUM CHLORIDE 1000 MG: 900 INJECTION, SOLUTION INTRAVENOUS at 00:00

## 2020-01-01 RX ADMIN — POTASSIUM CHLORIDE 10 MEQ: 10 INJECTION, SOLUTION INTRAVENOUS at 11:12

## 2020-01-01 RX ADMIN — METHOCARBAMOL 500 MG: 500 TABLET, FILM COATED ORAL at 05:33

## 2020-01-01 RX ADMIN — Medication 10 ML: at 16:30

## 2020-01-01 RX ADMIN — PROPOFOL 30 MCG/KG/MIN: 10 INJECTION, EMULSION INTRAVENOUS at 13:06

## 2020-01-01 RX ADMIN — PANTOPRAZOLE SODIUM 40 MG: 40 INJECTION, POWDER, FOR SOLUTION INTRAVENOUS at 23:10

## 2020-01-01 RX ADMIN — ACETAMINOPHEN 1000 MG: 500 TABLET ORAL at 11:13

## 2020-01-01 RX ADMIN — ROSUVASTATIN CALCIUM 5 MG: 10 TABLET, FILM COATED ORAL at 21:14

## 2020-01-01 RX ADMIN — OXYCODONE HYDROCHLORIDE AND ACETAMINOPHEN 1 TABLET: 10; 325 TABLET ORAL at 06:21

## 2020-01-01 RX ADMIN — FERROUS SULFATE TAB 325 MG (65 MG ELEMENTAL FE) 325 MG: 325 (65 FE) TAB at 09:10

## 2020-01-01 RX ADMIN — SODIUM CHLORIDE 1000 MG: 900 INJECTION, SOLUTION INTRAVENOUS at 04:28

## 2020-01-01 RX ADMIN — APIXABAN 5 MG: 5 TABLET, FILM COATED ORAL at 09:21

## 2020-01-01 RX ADMIN — Medication 10 ML: at 22:28

## 2020-01-01 RX ADMIN — INSULIN LISPRO 6 UNITS: 100 INJECTION, SOLUTION INTRAVENOUS; SUBCUTANEOUS at 12:38

## 2020-01-01 RX ADMIN — PIPERACILLIN SODIUM AND TAZOBACTAM SODIUM 4.5 G: 4; .5 INJECTION, POWDER, LYOPHILIZED, FOR SOLUTION INTRAVENOUS at 06:22

## 2020-01-01 RX ADMIN — IPRATROPIUM BROMIDE AND ALBUTEROL SULFATE 3 ML: .5; 3 SOLUTION RESPIRATORY (INHALATION) at 10:22

## 2020-01-01 RX ADMIN — METHADONE HYDROCHLORIDE 20 MG: 10 TABLET ORAL at 08:14

## 2020-01-01 RX ADMIN — Medication 150 MCG/HR: at 09:48

## 2020-01-01 RX ADMIN — NYSTATIN 500000 UNITS: 500000 SUSPENSION ORAL at 22:00

## 2020-01-01 RX ADMIN — SPIRONOLACTONE 25 MG: 25 TABLET ORAL at 09:41

## 2020-01-01 RX ADMIN — ALBUMIN (HUMAN) 12.5 G: 0.25 INJECTION, SOLUTION INTRAVENOUS at 09:59

## 2020-01-01 RX ADMIN — IPRATROPIUM BROMIDE AND ALBUTEROL SULFATE 3 ML: .5; 3 SOLUTION RESPIRATORY (INHALATION) at 16:18

## 2020-01-01 RX ADMIN — AMIODARONE HYDROCHLORIDE 1 MG/MIN: 1.8 INJECTION, SOLUTION INTRAVENOUS at 09:22

## 2020-01-01 RX ADMIN — ALBUTEROL SULFATE 2.5 MG: 2.5 SOLUTION RESPIRATORY (INHALATION) at 11:13

## 2020-01-01 RX ADMIN — PIPERACILLIN SODIUM AND TAZOBACTAM SODIUM 4.5 G: 4; .5 INJECTION, POWDER, LYOPHILIZED, FOR SOLUTION INTRAVENOUS at 17:02

## 2020-01-01 RX ADMIN — CEFEPIME 2 G: 2 INJECTION, POWDER, FOR SOLUTION INTRAVENOUS at 08:16

## 2020-01-01 RX ADMIN — PANTOPRAZOLE SODIUM 40 MG: 40 TABLET, DELAYED RELEASE ORAL at 09:25

## 2020-01-01 RX ADMIN — POTASSIUM BICARBONATE 20 MEQ: 782 TABLET, EFFERVESCENT ORAL at 12:14

## 2020-01-01 RX ADMIN — PANTOPRAZOLE SODIUM 40 MG: 40 TABLET, DELAYED RELEASE ORAL at 08:40

## 2020-01-01 RX ADMIN — FUROSEMIDE 20 MG: 10 INJECTION, SOLUTION INTRAMUSCULAR; INTRAVENOUS at 20:36

## 2020-01-01 RX ADMIN — VANCOMYCIN HYDROCHLORIDE 1750 MG: 10 INJECTION, POWDER, LYOPHILIZED, FOR SOLUTION INTRAVENOUS at 09:11

## 2020-01-01 RX ADMIN — Medication 10 ML: at 20:20

## 2020-01-01 RX ADMIN — ROSUVASTATIN CALCIUM 5 MG: 10 TABLET, FILM COATED ORAL at 21:50

## 2020-01-01 RX ADMIN — OXYCODONE AND ACETAMINOPHEN 1 TABLET: 5; 325 TABLET ORAL at 14:38

## 2020-01-01 RX ADMIN — LEVOTHYROXINE SODIUM 100 MCG: 0.1 TABLET ORAL at 05:11

## 2020-01-01 RX ADMIN — DIPHENHYDRAMINE HYDROCHLORIDE 25 MG: 50 INJECTION, SOLUTION INTRAMUSCULAR; INTRAVENOUS at 14:13

## 2020-01-01 RX ADMIN — IPRATROPIUM BROMIDE AND ALBUTEROL SULFATE 3 ML: .5; 3 SOLUTION RESPIRATORY (INHALATION) at 20:05

## 2020-01-01 RX ADMIN — ALBUTEROL SULFATE 1 PUFF: 90 AEROSOL, METERED RESPIRATORY (INHALATION) at 21:30

## 2020-01-01 RX ADMIN — PREDNISONE 30 MG: 10 TABLET ORAL at 09:44

## 2020-01-01 RX ADMIN — FUROSEMIDE 20 MG: 20 TABLET ORAL at 08:30

## 2020-01-01 RX ADMIN — IPRATROPIUM BROMIDE AND ALBUTEROL SULFATE 3 ML: .5; 3 SOLUTION RESPIRATORY (INHALATION) at 04:00

## 2020-01-01 RX ADMIN — CALCIUM CITRATE 200 MG (950 MG) TABLET 200 MG: at 08:45

## 2020-01-01 RX ADMIN — PROPOFOL 50 MCG/KG/MIN: 10 INJECTION, EMULSION INTRAVENOUS at 12:27

## 2020-01-01 RX ADMIN — FLUTICASONE FUROATE AND VILANTEROL TRIFENATATE 1 PUFF: 100; 25 POWDER RESPIRATORY (INHALATION) at 08:59

## 2020-01-01 RX ADMIN — BUSPIRONE HYDROCHLORIDE 5 MG: 5 TABLET ORAL at 22:03

## 2020-01-01 RX ADMIN — CALCIUM CITRATE 200 MG (950 MG) TABLET 200 MG: at 17:20

## 2020-01-01 RX ADMIN — AMIODARONE HYDROCHLORIDE 0.5 MG/MIN: 1.8 INJECTION, SOLUTION INTRAVENOUS at 14:14

## 2020-01-01 RX ADMIN — METHOCARBAMOL 500 MG: 500 TABLET, FILM COATED ORAL at 05:04

## 2020-01-01 RX ADMIN — INSULIN LISPRO 6 UNITS: 100 INJECTION, SOLUTION INTRAVENOUS; SUBCUTANEOUS at 17:27

## 2020-01-01 RX ADMIN — FENTANYL CITRATE 100 MCG: 50 INJECTION INTRAMUSCULAR; INTRAVENOUS at 15:13

## 2020-01-01 RX ADMIN — BUDESONIDE 500 MCG: 0.5 INHALANT RESPIRATORY (INHALATION) at 08:09

## 2020-01-01 RX ADMIN — GUAIFENESIN 600 MG: 600 TABLET, EXTENDED RELEASE ORAL at 21:29

## 2020-01-01 RX ADMIN — BUSPIRONE HYDROCHLORIDE 5 MG: 5 TABLET ORAL at 17:44

## 2020-01-01 RX ADMIN — ENOXAPARIN SODIUM 70 MG: 80 INJECTION SUBCUTANEOUS at 09:30

## 2020-01-01 RX ADMIN — PIPERACILLIN SODIUM AND TAZOBACTAM SODIUM 4.5 G: 4; .5 INJECTION, POWDER, LYOPHILIZED, FOR SOLUTION INTRAVENOUS at 00:35

## 2020-01-01 RX ADMIN — BUDESONIDE 500 MCG: 0.5 INHALANT RESPIRATORY (INHALATION) at 11:10

## 2020-01-01 RX ADMIN — ASPIRIN 81 MG 81 MG: 81 TABLET ORAL at 09:05

## 2020-01-01 RX ADMIN — VANCOMYCIN HYDROCHLORIDE 1500 MG: 10 INJECTION, POWDER, LYOPHILIZED, FOR SOLUTION INTRAVENOUS at 11:27

## 2020-01-01 RX ADMIN — INSULIN LISPRO 4 UNITS: 100 INJECTION, SOLUTION INTRAVENOUS; SUBCUTANEOUS at 21:38

## 2020-01-01 RX ADMIN — INSULIN LISPRO 4 UNITS: 100 INJECTION, SOLUTION INTRAVENOUS; SUBCUTANEOUS at 12:24

## 2020-01-01 RX ADMIN — INSULIN LISPRO 4 UNITS: 100 INJECTION, SOLUTION INTRAVENOUS; SUBCUTANEOUS at 18:10

## 2020-01-01 RX ADMIN — BUSPIRONE HYDROCHLORIDE 5 MG: 5 TABLET ORAL at 10:05

## 2020-01-01 RX ADMIN — Medication 10 ML: at 23:51

## 2020-01-01 RX ADMIN — NITROFURANTOIN MACROCRYSTALS 100 MG: 50 CAPSULE ORAL at 12:52

## 2020-01-01 RX ADMIN — IOPAMIDOL 100 ML: 612 INJECTION, SOLUTION INTRAVENOUS at 18:00

## 2020-01-01 RX ADMIN — INSULIN LISPRO 12 UNITS: 100 INJECTION, SOLUTION INTRAVENOUS; SUBCUTANEOUS at 22:42

## 2020-01-01 RX ADMIN — LUBIPROSTONE 24 MCG: 24 CAPSULE, GELATIN COATED ORAL at 09:24

## 2020-01-01 RX ADMIN — Medication 5 ML: at 22:26

## 2020-01-01 RX ADMIN — DOCUSATE SODIUM 100 MG: 100 CAPSULE, LIQUID FILLED ORAL at 08:45

## 2020-01-01 RX ADMIN — BUSPIRONE HYDROCHLORIDE 15 MG: 10 TABLET ORAL at 09:42

## 2020-01-01 RX ADMIN — AMIODARONE HYDROCHLORIDE 200 MG: 200 TABLET ORAL at 09:42

## 2020-01-01 RX ADMIN — BUSPIRONE HYDROCHLORIDE 5 MG: 5 TABLET ORAL at 22:10

## 2020-01-01 RX ADMIN — ENOXAPARIN SODIUM 70 MG: 80 INJECTION SUBCUTANEOUS at 13:23

## 2020-01-01 RX ADMIN — Medication 200 MCG/HR: at 03:00

## 2020-01-01 RX ADMIN — INSULIN LISPRO 12 UNITS: 100 INJECTION, SOLUTION INTRAVENOUS; SUBCUTANEOUS at 21:50

## 2020-01-01 RX ADMIN — PIPERACILLIN SODIUM AND TAZOBACTAM SODIUM 4.5 G: 4; .5 INJECTION, POWDER, LYOPHILIZED, FOR SOLUTION INTRAVENOUS at 11:14

## 2020-01-01 RX ADMIN — Medication 5 ML: at 23:52

## 2020-01-01 RX ADMIN — GUAIFENESIN 600 MG: 600 TABLET, EXTENDED RELEASE ORAL at 08:33

## 2020-01-01 RX ADMIN — VASOPRESSIN 0.03 UNITS/MIN: 20 INJECTION INTRAVENOUS at 03:34

## 2020-01-01 RX ADMIN — LEVOTHYROXINE SODIUM 75 MCG: 25 TABLET ORAL at 06:25

## 2020-01-01 RX ADMIN — INSULIN LISPRO 6 UNITS: 100 INJECTION, SOLUTION INTRAVENOUS; SUBCUTANEOUS at 11:51

## 2020-01-01 RX ADMIN — PIPERACILLIN SODIUM AND TAZOBACTAM SODIUM 4.5 G: 4; .5 INJECTION, POWDER, LYOPHILIZED, FOR SOLUTION INTRAVENOUS at 06:05

## 2020-01-01 RX ADMIN — PROPOFOL 50 MCG/KG/MIN: 10 INJECTION, EMULSION INTRAVENOUS at 18:59

## 2020-01-01 RX ADMIN — PIPERACILLIN SODIUM AND TAZOBACTAM SODIUM 4.5 G: 4; .5 INJECTION, POWDER, LYOPHILIZED, FOR SOLUTION INTRAVENOUS at 00:30

## 2020-01-01 RX ADMIN — OXYCODONE HYDROCHLORIDE 5 MG: 5 TABLET ORAL at 13:08

## 2020-01-01 RX ADMIN — CALCIUM GLUCONATE 2 G: 98 INJECTION, SOLUTION INTRAVENOUS at 17:14

## 2020-01-01 RX ADMIN — AZTREONAM 1 G: 1 INJECTION, POWDER, LYOPHILIZED, FOR SOLUTION INTRAMUSCULAR; INTRAVENOUS at 05:53

## 2020-01-01 RX ADMIN — PREDNISONE 30 MG: 20 TABLET ORAL at 08:36

## 2020-01-01 RX ADMIN — METHOCARBAMOL 500 MG: 500 TABLET, FILM COATED ORAL at 06:00

## 2020-01-01 RX ADMIN — POTASSIUM CHLORIDE 20 MEQ: 14.9 INJECTION, SOLUTION INTRAVENOUS at 00:34

## 2020-01-01 RX ADMIN — MAGNESIUM SULFATE HEPTAHYDRATE 1 G: 1 INJECTION, SOLUTION INTRAVENOUS at 20:45

## 2020-01-01 RX ADMIN — SODIUM CHLORIDE 1000 ML: 900 INJECTION, SOLUTION INTRAVENOUS at 10:04

## 2020-01-01 RX ADMIN — BUSPIRONE HYDROCHLORIDE 5 MG: 5 TABLET ORAL at 21:03

## 2020-01-01 RX ADMIN — PANTOPRAZOLE SODIUM 40 MG: 40 INJECTION, POWDER, FOR SOLUTION INTRAVENOUS at 09:09

## 2020-01-01 RX ADMIN — INSULIN LISPRO 6 UNITS: 100 INJECTION, SOLUTION INTRAVENOUS; SUBCUTANEOUS at 21:45

## 2020-01-01 RX ADMIN — METHADONE HYDROCHLORIDE 20 MG: 10 TABLET ORAL at 08:58

## 2020-01-01 RX ADMIN — PREDNISONE 30 MG: 10 TABLET ORAL at 09:41

## 2020-01-01 RX ADMIN — POTASSIUM CHLORIDE: 2 INJECTION, SOLUTION, CONCENTRATE INTRAVENOUS at 23:05

## 2020-01-01 RX ADMIN — ROSUVASTATIN CALCIUM 20 MG: 20 TABLET, COATED ORAL at 21:42

## 2020-01-01 RX ADMIN — Medication 75 MCG/HR: at 08:15

## 2020-01-01 RX ADMIN — HYDROCORTISONE SODIUM SUCCINATE 50 MG: 100 INJECTION, POWDER, FOR SOLUTION INTRAMUSCULAR; INTRAVENOUS at 07:14

## 2020-01-01 RX ADMIN — SODIUM CHLORIDE SOLN NEBU 3% 4 ML: 3 NEBU SOLN at 10:49

## 2020-01-01 RX ADMIN — FENTANYL CITRATE 100 MCG: 50 INJECTION INTRAMUSCULAR; INTRAVENOUS at 01:38

## 2020-01-01 RX ADMIN — INSULIN LISPRO 3 UNITS: 100 INJECTION, SOLUTION INTRAVENOUS; SUBCUTANEOUS at 07:23

## 2020-01-01 RX ADMIN — MAGNESIUM SULFATE HEPTAHYDRATE 2 G: 40 INJECTION, SOLUTION INTRAVENOUS at 08:00

## 2020-01-01 RX ADMIN — ALLOPURINOL 100 MG: 100 TABLET ORAL at 08:40

## 2020-01-01 RX ADMIN — INSULIN GLARGINE 25 UNITS: 100 INJECTION, SOLUTION SUBCUTANEOUS at 08:32

## 2020-01-01 RX ADMIN — ROFLUMILAST 250 MCG: 500 TABLET ORAL at 12:16

## 2020-01-01 RX ADMIN — ASPIRIN 81 MG 81 MG: 81 TABLET ORAL at 09:10

## 2020-01-01 RX ADMIN — AMLODIPINE BESYLATE 2.5 MG: 2.5 TABLET ORAL at 12:19

## 2020-01-01 RX ADMIN — GLYCOPYRROLATE AND FORMOTEROL FUMARATE 2 PUFF: 9; 4.8 AEROSOL, METERED RESPIRATORY (INHALATION) at 20:00

## 2020-01-01 RX ADMIN — OXYBUTYNIN CHLORIDE 5 MG: 5 TABLET ORAL at 08:27

## 2020-01-01 RX ADMIN — BUSPIRONE HYDROCHLORIDE 5 MG: 5 TABLET ORAL at 08:37

## 2020-01-01 RX ADMIN — SODIUM CHLORIDE 1000 MG: 900 INJECTION, SOLUTION INTRAVENOUS at 20:19

## 2020-01-01 RX ADMIN — APIXABAN 5 MG: 5 TABLET, FILM COATED ORAL at 18:13

## 2020-01-01 RX ADMIN — INSULIN GLARGINE 25 UNITS: 100 INJECTION, SOLUTION SUBCUTANEOUS at 09:44

## 2020-01-01 RX ADMIN — INSULIN LISPRO 4 UNITS: 100 INJECTION, SOLUTION INTRAVENOUS; SUBCUTANEOUS at 17:29

## 2020-01-01 RX ADMIN — PROPOFOL 50 MCG/KG/MIN: 10 INJECTION, EMULSION INTRAVENOUS at 03:36

## 2020-01-01 RX ADMIN — CHOLESTYRAMINE 4 G: 4 POWDER, FOR SUSPENSION ORAL at 12:22

## 2020-01-01 RX ADMIN — PANTOPRAZOLE SODIUM 40 MG: 40 TABLET, DELAYED RELEASE ORAL at 16:21

## 2020-01-01 RX ADMIN — OXYCODONE AND ACETAMINOPHEN 1 TABLET: 5; 325 TABLET ORAL at 20:23

## 2020-01-01 RX ADMIN — SODIUM CHLORIDE SOLN NEBU 3% 4 ML: 3 NEBU SOLN at 20:56

## 2020-01-01 RX ADMIN — METOCLOPRAMIDE HYDROCHLORIDE 10 MG: 5 TABLET ORAL at 11:00

## 2020-01-01 RX ADMIN — FAMOTIDINE 20 MG: 20 TABLET, FILM COATED ORAL at 17:28

## 2020-01-01 RX ADMIN — IPRATROPIUM BROMIDE AND ALBUTEROL SULFATE 3 ML: .5; 3 SOLUTION RESPIRATORY (INHALATION) at 12:10

## 2020-01-01 RX ADMIN — SODIUM CHLORIDE 1000 MG: 900 INJECTION, SOLUTION INTRAVENOUS at 00:30

## 2020-01-01 RX ADMIN — SERTRALINE HYDROCHLORIDE 50 MG: 50 TABLET ORAL at 08:47

## 2020-01-01 RX ADMIN — INSULIN LISPRO 15 UNITS: 100 INJECTION, SOLUTION INTRAVENOUS; SUBCUTANEOUS at 22:23

## 2020-01-01 RX ADMIN — SERTRALINE HYDROCHLORIDE 50 MG: 50 TABLET ORAL at 13:49

## 2020-01-01 RX ADMIN — IPRATROPIUM BROMIDE AND ALBUTEROL SULFATE 3 ML: .5; 3 SOLUTION RESPIRATORY (INHALATION) at 05:04

## 2020-01-01 RX ADMIN — OXYCODONE HYDROCHLORIDE AND ACETAMINOPHEN 1 TABLET: 10; 325 TABLET ORAL at 05:45

## 2020-01-01 RX ADMIN — ACETAMINOPHEN 325 MG: 325 TABLET ORAL at 22:33

## 2020-01-01 RX ADMIN — LUBIPROSTONE 24 MCG: 24 CAPSULE, GELATIN COATED ORAL at 08:27

## 2020-01-01 RX ADMIN — SPIRONOLACTONE 25 MG: 25 TABLET ORAL at 08:49

## 2020-01-01 RX ADMIN — OXYCODONE AND ACETAMINOPHEN 1 TABLET: 5; 325 TABLET ORAL at 06:41

## 2020-01-01 RX ADMIN — SODIUM CHLORIDE 1000 MG: 900 INJECTION, SOLUTION INTRAVENOUS at 23:35

## 2020-01-01 RX ADMIN — FENTANYL CITRATE 100 MCG: 50 INJECTION INTRAMUSCULAR; INTRAVENOUS at 01:30

## 2020-01-01 RX ADMIN — INSULIN LISPRO 4 UNITS: 100 INJECTION, SOLUTION INTRAVENOUS; SUBCUTANEOUS at 12:28

## 2020-01-01 RX ADMIN — POTASSIUM CHLORIDE: 2 INJECTION, SOLUTION, CONCENTRATE INTRAVENOUS at 22:04

## 2020-01-01 RX ADMIN — PANTOPRAZOLE SODIUM 40 MG: 40 TABLET, DELAYED RELEASE ORAL at 15:44

## 2020-01-01 RX ADMIN — Medication 50 MCG/HR: at 15:30

## 2020-01-01 RX ADMIN — ASPIRIN 81 MG 81 MG: 81 TABLET ORAL at 08:30

## 2020-01-01 RX ADMIN — INSULIN GLARGINE 16 UNITS: 100 INJECTION, SOLUTION SUBCUTANEOUS at 08:19

## 2020-01-01 RX ADMIN — GUAIFENESIN 600 MG: 600 TABLET, EXTENDED RELEASE ORAL at 21:14

## 2020-01-01 RX ADMIN — OXYCODONE HYDROCHLORIDE AND ACETAMINOPHEN 1 TABLET: 10; 325 TABLET ORAL at 21:49

## 2020-01-01 RX ADMIN — POTASSIUM CHLORIDE 20 MEQ: 14.9 INJECTION, SOLUTION INTRAVENOUS at 22:29

## 2020-01-01 RX ADMIN — PANTOPRAZOLE SODIUM 40 MG: 40 INJECTION, POWDER, FOR SOLUTION INTRAVENOUS at 09:05

## 2020-01-01 RX ADMIN — Medication 10 ML: at 22:02

## 2020-01-01 RX ADMIN — INSULIN LISPRO 3 UNITS: 100 INJECTION, SOLUTION INTRAVENOUS; SUBCUTANEOUS at 16:08

## 2020-01-01 RX ADMIN — FERROUS SULFATE TAB 325 MG (65 MG ELEMENTAL FE) 325 MG: 325 (65 FE) TAB at 09:43

## 2020-01-01 RX ADMIN — GUAIFENESIN 600 MG: 600 TABLET, EXTENDED RELEASE ORAL at 08:42

## 2020-01-01 RX ADMIN — ALBUTEROL SULFATE 1 PUFF: 90 AEROSOL, METERED RESPIRATORY (INHALATION) at 04:23

## 2020-01-01 RX ADMIN — SENNOSIDES AND DOCUSATE SODIUM 2 TABLET: 8.6; 5 TABLET ORAL at 19:27

## 2020-01-01 RX ADMIN — SENNOSIDES AND DOCUSATE SODIUM 2 TABLET: 8.6; 5 TABLET ORAL at 18:30

## 2020-01-01 RX ADMIN — BUDESONIDE 500 MCG: 0.5 INHALANT RESPIRATORY (INHALATION) at 20:55

## 2020-01-01 RX ADMIN — IPRATROPIUM BROMIDE AND ALBUTEROL SULFATE 3 ML: .5; 3 SOLUTION RESPIRATORY (INHALATION) at 01:42

## 2020-01-01 RX ADMIN — GLYCOPYRROLATE AND FORMOTEROL FUMARATE 2 PUFF: 9; 4.8 AEROSOL, METERED RESPIRATORY (INHALATION) at 08:00

## 2020-01-01 RX ADMIN — Medication 10 ML: at 15:00

## 2020-01-01 RX ADMIN — SENNOSIDES AND DOCUSATE SODIUM 2 TABLET: 8.6; 5 TABLET ORAL at 17:30

## 2020-01-01 RX ADMIN — SODIUM CHLORIDE 100 MG: 9 INJECTION, SOLUTION INTRAVENOUS at 17:39

## 2020-01-01 RX ADMIN — POTASSIUM CHLORIDE 20 MEQ: 29.8 INJECTION, SOLUTION INTRAVENOUS at 09:18

## 2020-01-01 RX ADMIN — APIXABAN 5 MG: 5 TABLET, FILM COATED ORAL at 09:27

## 2020-01-01 RX ADMIN — POTASSIUM CHLORIDE: 2 INJECTION, SOLUTION, CONCENTRATE INTRAVENOUS at 16:00

## 2020-01-01 RX ADMIN — INSULIN LISPRO 4 UNITS: 100 INJECTION, SOLUTION INTRAVENOUS; SUBCUTANEOUS at 08:46

## 2020-01-01 RX ADMIN — POTASSIUM BICARBONATE 40 MEQ: 782 TABLET, EFFERVESCENT ORAL at 11:00

## 2020-01-01 RX ADMIN — INSULIN LISPRO 2 UNITS: 100 INJECTION, SOLUTION INTRAVENOUS; SUBCUTANEOUS at 12:10

## 2020-01-01 RX ADMIN — SERTRALINE HYDROCHLORIDE 50 MG: 50 TABLET ORAL at 08:30

## 2020-01-01 RX ADMIN — ROFLUMILAST 250 MCG: 500 TABLET ORAL at 13:49

## 2020-01-01 RX ADMIN — OXYCODONE HYDROCHLORIDE AND ACETAMINOPHEN 1 TABLET: 10; 325 TABLET ORAL at 08:46

## 2020-01-01 RX ADMIN — LEVOTHYROXINE SODIUM 75 MCG: 25 TABLET ORAL at 06:02

## 2020-01-01 RX ADMIN — BUDESONIDE 500 MCG: 0.5 INHALANT RESPIRATORY (INHALATION) at 20:20

## 2020-01-01 RX ADMIN — POTASSIUM CHLORIDE: 2 INJECTION, SOLUTION, CONCENTRATE INTRAVENOUS at 15:00

## 2020-01-01 RX ADMIN — FENTANYL CITRATE 100 MCG: 50 INJECTION INTRAMUSCULAR; INTRAVENOUS at 16:55

## 2020-01-01 RX ADMIN — OXYCODONE HYDROCHLORIDE 5 MG: 5 TABLET ORAL at 00:07

## 2020-01-01 RX ADMIN — SPIRONOLACTONE 25 MG: 25 TABLET ORAL at 09:44

## 2020-01-01 RX ADMIN — GLYCOPYRROLATE AND FORMOTEROL FUMARATE 2 PUFF: 9; 4.8 AEROSOL, METERED RESPIRATORY (INHALATION) at 21:30

## 2020-01-01 RX ADMIN — OXYBUTYNIN CHLORIDE 5 MG: 5 TABLET ORAL at 08:14

## 2020-01-01 RX ADMIN — BUDESONIDE 500 MCG: 0.5 INHALANT RESPIRATORY (INHALATION) at 10:49

## 2020-01-01 RX ADMIN — MIDAZOLAM HYDROCHLORIDE 2 MG: 2 INJECTION, SOLUTION INTRAMUSCULAR; INTRAVENOUS at 09:09

## 2020-01-01 RX ADMIN — OXYCODONE AND ACETAMINOPHEN 1 TABLET: 5; 325 TABLET ORAL at 06:27

## 2020-01-01 RX ADMIN — ALBUTEROL SULFATE 1 PUFF: 90 AEROSOL, METERED RESPIRATORY (INHALATION) at 00:42

## 2020-01-01 RX ADMIN — FENTANYL CITRATE 100 MCG: 50 INJECTION INTRAMUSCULAR; INTRAVENOUS at 03:45

## 2020-01-01 RX ADMIN — ARIPIPRAZOLE 10 MG: 10 TABLET ORAL at 09:41

## 2020-01-01 RX ADMIN — NALOXONE HYDROCHLORIDE 1 MG: 1 INJECTION PARENTERAL at 18:43

## 2020-01-01 RX ADMIN — INSULIN LISPRO 4 UNITS: 100 INJECTION, SOLUTION INTRAVENOUS; SUBCUTANEOUS at 12:20

## 2020-01-01 RX ADMIN — IPRATROPIUM BROMIDE AND ALBUTEROL SULFATE 3 ML: .5; 3 SOLUTION RESPIRATORY (INHALATION) at 23:28

## 2020-01-01 RX ADMIN — SERTRALINE HYDROCHLORIDE 50 MG: 50 TABLET ORAL at 08:39

## 2020-01-01 RX ADMIN — DIVALPROEX SODIUM 500 MG: 250 TABLET, DELAYED RELEASE ORAL at 21:29

## 2020-01-01 RX ADMIN — GUAIFENESIN 600 MG: 600 TABLET, EXTENDED RELEASE ORAL at 08:47

## 2020-01-01 RX ADMIN — Medication 200 MCG/HR: at 17:45

## 2020-01-01 RX ADMIN — PHENYLEPHRINE HYDROCHLORIDE 300 MCG/MIN: 10 INJECTION INTRAVENOUS at 20:00

## 2020-01-01 RX ADMIN — CALCIUM CITRATE 200 MG (950 MG) TABLET 200 MG: at 08:32

## 2020-01-01 RX ADMIN — MAGNESIUM SULFATE IN WATER 2 G: 40 INJECTION, SOLUTION INTRAVENOUS at 15:52

## 2020-01-01 RX ADMIN — APIXABAN 5 MG: 5 TABLET, FILM COATED ORAL at 08:48

## 2020-01-01 RX ADMIN — PANTOPRAZOLE SODIUM 40 MG: 40 INJECTION, POWDER, FOR SOLUTION INTRAVENOUS at 09:07

## 2020-01-01 RX ADMIN — HYDROCORTISONE SODIUM SUCCINATE 50 MG: 100 INJECTION, POWDER, FOR SOLUTION INTRAMUSCULAR; INTRAVENOUS at 17:11

## 2020-01-01 RX ADMIN — BUSPIRONE HYDROCHLORIDE 5 MG: 5 TABLET ORAL at 08:45

## 2020-01-01 RX ADMIN — INSULIN GLARGINE 25 UNITS: 100 INJECTION, SOLUTION SUBCUTANEOUS at 08:15

## 2020-01-01 RX ADMIN — DIVALPROEX SODIUM 500 MG: 250 TABLET, DELAYED RELEASE ORAL at 21:51

## 2020-01-01 RX ADMIN — INSULIN LISPRO 6 UNITS: 100 INJECTION, SOLUTION INTRAVENOUS; SUBCUTANEOUS at 12:29

## 2020-01-01 RX ADMIN — ROSUVASTATIN CALCIUM 5 MG: 10 TABLET, FILM COATED ORAL at 21:35

## 2020-01-01 RX ADMIN — INSULIN GLARGINE 10 UNITS: 100 INJECTION, SOLUTION SUBCUTANEOUS at 08:43

## 2020-01-01 RX ADMIN — HYDROCORTISONE SODIUM SUCCINATE 50 MG: 100 INJECTION, POWDER, FOR SOLUTION INTRAMUSCULAR; INTRAVENOUS at 11:17

## 2020-01-01 RX ADMIN — INSULIN GLARGINE 16 UNITS: 100 INJECTION, SOLUTION SUBCUTANEOUS at 08:58

## 2020-01-01 RX ADMIN — AMLODIPINE BESYLATE 5 MG: 5 TABLET ORAL at 09:42

## 2020-01-01 RX ADMIN — FUROSEMIDE 20 MG: 20 TABLET ORAL at 09:23

## 2020-01-01 RX ADMIN — HEPARIN SODIUM 5000 UNITS: 5000 INJECTION INTRAVENOUS; SUBCUTANEOUS at 09:08

## 2020-01-01 RX ADMIN — ALBUMIN HUMAN 25 G: 50 SOLUTION INTRAVENOUS at 13:00

## 2020-01-01 RX ADMIN — CEFEPIME HYDROCHLORIDE 1 G: 1 INJECTION, POWDER, FOR SOLUTION INTRAMUSCULAR; INTRAVENOUS at 10:49

## 2020-01-01 RX ADMIN — AMIODARONE HYDROCHLORIDE 200 MG: 200 TABLET ORAL at 08:28

## 2020-01-01 RX ADMIN — CALCIUM CITRATE 200 MG (950 MG) TABLET 200 MG: at 09:00

## 2020-01-01 RX ADMIN — FENTANYL CITRATE 50 MCG: 50 INJECTION INTRAMUSCULAR; INTRAVENOUS at 13:14

## 2020-01-01 RX ADMIN — ASPIRIN 81 MG 81 MG: 81 TABLET ORAL at 08:29

## 2020-01-01 RX ADMIN — BUSPIRONE HYDROCHLORIDE 5 MG: 5 TABLET ORAL at 08:53

## 2020-01-01 RX ADMIN — SODIUM CHLORIDE SOLN NEBU 3% 4 ML: 3 NEBU SOLN at 20:28

## 2020-01-01 RX ADMIN — SODIUM CHLORIDE 1000 MG: 900 INJECTION, SOLUTION INTRAVENOUS at 04:24

## 2020-01-01 RX ADMIN — OXYBUTYNIN CHLORIDE 5 MG: 5 TABLET ORAL at 09:24

## 2020-01-01 RX ADMIN — FENTANYL CITRATE 50 MCG: 50 INJECTION INTRAMUSCULAR; INTRAVENOUS at 18:00

## 2020-01-01 RX ADMIN — SODIUM CHLORIDE 1000 MG: 900 INJECTION, SOLUTION INTRAVENOUS at 16:00

## 2020-01-01 RX ADMIN — Medication 400 MG: at 09:42

## 2020-01-01 RX ADMIN — MIDAZOLAM HYDROCHLORIDE 2 MG: 2 INJECTION, SOLUTION INTRAMUSCULAR; INTRAVENOUS at 13:51

## 2020-01-01 RX ADMIN — IPRATROPIUM BROMIDE AND ALBUTEROL SULFATE 3 ML: .5; 3 SOLUTION RESPIRATORY (INHALATION) at 16:31

## 2020-01-01 RX ADMIN — INSULIN GLARGINE 10 UNITS: 100 INJECTION, SOLUTION SUBCUTANEOUS at 08:50

## 2020-01-01 RX ADMIN — FUROSEMIDE 20 MG: 20 TABLET ORAL at 08:39

## 2020-01-01 RX ADMIN — NYSTATIN 500000 UNITS: 500000 SUSPENSION ORAL at 21:41

## 2020-01-01 RX ADMIN — ACETAMINOPHEN 650 MG: 325 TABLET ORAL at 20:16

## 2020-01-01 RX ADMIN — INSULIN GLARGINE 25 UNITS: 100 INJECTION, SOLUTION SUBCUTANEOUS at 08:07

## 2020-01-01 RX ADMIN — PREDNISONE 30 MG: 5 TABLET ORAL at 08:36

## 2020-01-01 RX ADMIN — AMIODARONE HYDROCHLORIDE 200 MG: 200 TABLET ORAL at 18:51

## 2020-01-01 RX ADMIN — PREDNISONE 30 MG: 5 TABLET ORAL at 10:04

## 2020-01-01 RX ADMIN — ALBUTEROL SULFATE 1 PUFF: 90 AEROSOL, METERED RESPIRATORY (INHALATION) at 16:31

## 2020-01-01 RX ADMIN — FLUTICASONE FUROATE AND VILANTEROL TRIFENATATE 1 PUFF: 100; 25 POWDER RESPIRATORY (INHALATION) at 08:46

## 2020-01-01 RX ADMIN — OXYCODONE HYDROCHLORIDE AND ACETAMINOPHEN 1 TABLET: 10; 325 TABLET ORAL at 09:42

## 2020-01-01 RX ADMIN — SODIUM CHLORIDE 1000 MG: 900 INJECTION, SOLUTION INTRAVENOUS at 16:02

## 2020-01-01 RX ADMIN — Medication 1 CAPSULE: at 13:49

## 2020-01-01 RX ADMIN — BUDESONIDE 500 MCG: 0.5 INHALANT RESPIRATORY (INHALATION) at 19:44

## 2020-01-01 RX ADMIN — OXYBUTYNIN CHLORIDE 5 MG: 5 TABLET ORAL at 08:45

## 2020-01-01 RX ADMIN — Medication 10 ML: at 15:45

## 2020-01-01 RX ADMIN — POTASSIUM CHLORIDE 10 MEQ: 10 INJECTION, SOLUTION INTRAVENOUS at 05:40

## 2020-01-01 RX ADMIN — INSULIN LISPRO 4 UNITS: 100 INJECTION, SOLUTION INTRAVENOUS; SUBCUTANEOUS at 08:55

## 2020-01-01 RX ADMIN — IPRATROPIUM BROMIDE AND ALBUTEROL SULFATE 3 ML: .5; 3 SOLUTION RESPIRATORY (INHALATION) at 03:04

## 2020-01-01 RX ADMIN — LEVOTHYROXINE SODIUM 75 MCG: 25 TABLET ORAL at 05:00

## 2020-01-01 RX ADMIN — METOCLOPRAMIDE HYDROCHLORIDE 10 MG: 5 TABLET ORAL at 11:12

## 2020-01-01 RX ADMIN — INSULIN LISPRO 4 UNITS: 100 INJECTION, SOLUTION INTRAVENOUS; SUBCUTANEOUS at 17:27

## 2020-01-01 RX ADMIN — ALLOPURINOL 100 MG: 100 TABLET ORAL at 08:42

## 2020-01-01 RX ADMIN — Medication 200 MCG/HR: at 13:06

## 2020-01-01 RX ADMIN — GUAIFENESIN 600 MG: 600 TABLET, EXTENDED RELEASE ORAL at 08:52

## 2020-01-01 RX ADMIN — HYDROCORTISONE SODIUM SUCCINATE 50 MG: 100 INJECTION, POWDER, FOR SOLUTION INTRAMUSCULAR; INTRAVENOUS at 12:21

## 2020-01-01 RX ADMIN — FAMOTIDINE 20 MG: 20 TABLET, FILM COATED ORAL at 09:31

## 2020-01-01 RX ADMIN — METHOCARBAMOL 500 MG: 500 TABLET, FILM COATED ORAL at 05:44

## 2020-01-01 RX ADMIN — POTASSIUM PHOSPHATE, MONOBASIC AND POTASSIUM PHOSPHATE, DIBASIC: 224; 236 INJECTION, SOLUTION INTRAVENOUS at 16:18

## 2020-01-01 RX ADMIN — MIDAZOLAM 2 MG: 1 INJECTION INTRAMUSCULAR; INTRAVENOUS at 11:14

## 2020-01-01 RX ADMIN — Medication 1 CAPSULE: at 09:00

## 2020-01-01 RX ADMIN — SODIUM CHLORIDE 40 MG: 9 INJECTION, SOLUTION INTRAMUSCULAR; INTRAVENOUS; SUBCUTANEOUS at 09:31

## 2020-01-01 RX ADMIN — INSULIN GLARGINE 10 UNITS: 100 INJECTION, SOLUTION SUBCUTANEOUS at 11:00

## 2020-01-01 RX ADMIN — GLYCOPYRROLATE AND FORMOTEROL FUMARATE 2 PUFF: 9; 4.8 AEROSOL, METERED RESPIRATORY (INHALATION) at 12:58

## 2020-01-01 RX ADMIN — PREDNISONE 30 MG: 10 TABLET ORAL at 08:49

## 2020-01-01 RX ADMIN — OXYCODONE AND ACETAMINOPHEN 1 TABLET: 5; 325 TABLET ORAL at 05:23

## 2020-01-01 RX ADMIN — ACETAMINOPHEN 325 MG: 325 TABLET ORAL at 20:19

## 2020-01-01 RX ADMIN — CEFPODOXIME PROXETIL 200 MG: 100 TABLET, FILM COATED ORAL at 22:26

## 2020-01-01 RX ADMIN — BUSPIRONE HYDROCHLORIDE 5 MG: 5 TABLET ORAL at 17:25

## 2020-01-01 RX ADMIN — CHOLESTYRAMINE 4 G: 4 POWDER, FOR SUSPENSION ORAL at 13:09

## 2020-01-01 RX ADMIN — ACETAZOLAMIDE 250 MG: 250 TABLET ORAL at 17:16

## 2020-01-01 RX ADMIN — METRONIDAZOLE 500 MG: 500 INJECTION, SOLUTION INTRAVENOUS at 15:00

## 2020-01-01 RX ADMIN — SERTRALINE HYDROCHLORIDE 50 MG: 50 TABLET ORAL at 08:29

## 2020-01-01 RX ADMIN — INSULIN LISPRO 2 UNITS: 100 INJECTION, SOLUTION INTRAVENOUS; SUBCUTANEOUS at 17:18

## 2020-01-01 RX ADMIN — Medication 200 MCG/HR: at 08:30

## 2020-01-01 RX ADMIN — DIGOXIN 250 MCG: 250 INJECTION, SOLUTION INTRAMUSCULAR; INTRAVENOUS; PARENTERAL at 13:27

## 2020-01-01 RX ADMIN — DOXYCYCLINE HYCLATE 100 MG: 100 CAPSULE ORAL at 08:29

## 2020-01-01 RX ADMIN — HEPARIN SODIUM 890 UNITS/HR: 10000 INJECTION, SOLUTION INTRAVENOUS at 20:22

## 2020-01-01 RX ADMIN — POTASSIUM CHLORIDE: 2 INJECTION, SOLUTION, CONCENTRATE INTRAVENOUS at 23:13

## 2020-01-01 RX ADMIN — SODIUM CHLORIDE 1000 MG: 900 INJECTION, SOLUTION INTRAVENOUS at 21:20

## 2020-01-01 RX ADMIN — CEFEPIME HYDROCHLORIDE 2 G: 2 INJECTION, POWDER, FOR SOLUTION INTRAVENOUS at 04:04

## 2020-01-01 RX ADMIN — METHOCARBAMOL 500 MG: 500 TABLET, FILM COATED ORAL at 13:47

## 2020-01-01 RX ADMIN — APIXABAN 5 MG: 5 TABLET, FILM COATED ORAL at 17:24

## 2020-01-01 RX ADMIN — OXYCODONE AND ACETAMINOPHEN 1 TABLET: 5; 325 TABLET ORAL at 12:29

## 2020-01-01 RX ADMIN — METHOCARBAMOL 500 MG: 500 TABLET, FILM COATED ORAL at 13:42

## 2020-01-01 RX ADMIN — OXYBUTYNIN CHLORIDE 5 MG: 5 TABLET ORAL at 17:49

## 2020-01-01 RX ADMIN — INSULIN LISPRO 6 UNITS: 100 INJECTION, SOLUTION INTRAVENOUS; SUBCUTANEOUS at 13:03

## 2020-01-01 RX ADMIN — METHADONE HYDROCHLORIDE 20 MG: 10 TABLET ORAL at 08:45

## 2020-01-01 RX ADMIN — IPRATROPIUM BROMIDE AND ALBUTEROL SULFATE 3 ML: .5; 3 SOLUTION RESPIRATORY (INHALATION) at 20:28

## 2020-01-01 RX ADMIN — APIXABAN 5 MG: 5 TABLET, FILM COATED ORAL at 08:49

## 2020-01-01 RX ADMIN — GUAIFENESIN 600 MG: 600 TABLET, EXTENDED RELEASE ORAL at 08:58

## 2020-01-01 RX ADMIN — GLYCOPYRROLATE AND FORMOTEROL FUMARATE 2 PUFF: 9; 4.8 AEROSOL, METERED RESPIRATORY (INHALATION) at 07:45

## 2020-01-01 RX ADMIN — FUROSEMIDE 20 MG: 20 TABLET ORAL at 12:15

## 2020-01-01 RX ADMIN — SODIUM CHLORIDE 1000 ML: 900 INJECTION, SOLUTION INTRAVENOUS at 21:20

## 2020-01-01 RX ADMIN — OXYCODONE AND ACETAMINOPHEN 1 TABLET: 5; 325 TABLET ORAL at 15:16

## 2020-01-01 RX ADMIN — LEVOTHYROXINE SODIUM 100 MCG: 100 TABLET ORAL at 13:49

## 2020-01-01 RX ADMIN — CALCIUM CITRATE 200 MG (950 MG) TABLET 200 MG: at 18:00

## 2020-01-01 RX ADMIN — PANTOPRAZOLE SODIUM 40 MG: 40 TABLET, DELAYED RELEASE ORAL at 16:18

## 2020-01-01 RX ADMIN — OXYBUTYNIN CHLORIDE 5 MG: 5 TABLET ORAL at 08:32

## 2020-01-01 RX ADMIN — ACETAZOLAMIDE 250 MG: 250 TABLET ORAL at 09:07

## 2020-01-01 RX ADMIN — POTASSIUM BICARBONATE 40 MEQ: 782 TABLET, EFFERVESCENT ORAL at 18:54

## 2020-01-01 RX ADMIN — AMLODIPINE BESYLATE 5 MG: 5 TABLET ORAL at 09:23

## 2020-01-01 RX ADMIN — PANTOPRAZOLE SODIUM 40 MG: 40 TABLET, DELAYED RELEASE ORAL at 09:45

## 2020-01-01 RX ADMIN — INSULIN LISPRO 9 UNITS: 100 INJECTION, SOLUTION INTRAVENOUS; SUBCUTANEOUS at 17:10

## 2020-01-01 RX ADMIN — PROPOFOL 15 MCG/KG/MIN: 10 INJECTION, EMULSION INTRAVENOUS at 11:13

## 2020-01-01 RX ADMIN — POTASSIUM BICARBONATE 40 MEQ: 782 TABLET, EFFERVESCENT ORAL at 16:19

## 2020-01-01 RX ADMIN — METHOCARBAMOL 500 MG: 500 TABLET, FILM COATED ORAL at 05:35

## 2020-01-01 RX ADMIN — INSULIN GLARGINE 10 UNITS: 100 INJECTION, SOLUTION SUBCUTANEOUS at 09:29

## 2020-01-01 RX ADMIN — PANTOPRAZOLE SODIUM 40 MG: 40 TABLET, DELAYED RELEASE ORAL at 08:29

## 2020-01-01 RX ADMIN — INSULIN LISPRO 6 UNITS: 100 INJECTION, SOLUTION INTRAVENOUS; SUBCUTANEOUS at 16:24

## 2020-01-01 RX ADMIN — INSULIN LISPRO 6 UNITS: 100 INJECTION, SOLUTION INTRAVENOUS; SUBCUTANEOUS at 06:16

## 2020-01-01 RX ADMIN — OXYCODONE AND ACETAMINOPHEN 1 TABLET: 5; 325 TABLET ORAL at 03:17

## 2020-01-01 RX ADMIN — METHOCARBAMOL 500 MG: 500 TABLET, FILM COATED ORAL at 05:52

## 2020-01-01 RX ADMIN — LEVOTHYROXINE SODIUM 100 MCG: 0.1 TABLET ORAL at 06:04

## 2020-01-01 RX ADMIN — BUSPIRONE HYDROCHLORIDE 15 MG: 10 TABLET ORAL at 22:11

## 2020-01-01 RX ADMIN — INSULIN LISPRO 3 UNITS: 100 INJECTION, SOLUTION INTRAVENOUS; SUBCUTANEOUS at 00:37

## 2020-01-01 RX ADMIN — PHENYLEPHRINE HYDROCHLORIDE 100 MCG/MIN: 10 INJECTION INTRAVENOUS at 13:56

## 2020-01-01 RX ADMIN — ROSUVASTATIN CALCIUM 20 MG: 20 TABLET, COATED ORAL at 23:51

## 2020-01-01 RX ADMIN — SODIUM CHLORIDE 1000 MG: 900 INJECTION, SOLUTION INTRAVENOUS at 00:44

## 2020-01-01 RX ADMIN — POTASSIUM BICARBONATE 40 MEQ: 782 TABLET, EFFERVESCENT ORAL at 15:00

## 2020-01-01 RX ADMIN — POTASSIUM CHLORIDE 40 MEQ: 1500 TABLET, EXTENDED RELEASE ORAL at 21:42

## 2020-01-01 RX ADMIN — CHLORHEXIDINE GLUCONATE 0.12% ORAL RINSE 10 ML: 1.2 LIQUID ORAL at 21:08

## 2020-01-01 RX ADMIN — METHOCARBAMOL 500 MG: 500 TABLET, FILM COATED ORAL at 21:34

## 2020-01-01 RX ADMIN — ASPIRIN 81 MG 81 MG: 81 TABLET ORAL at 08:59

## 2020-01-01 RX ADMIN — IPRATROPIUM BROMIDE AND ALBUTEROL SULFATE 3 ML: .5; 3 SOLUTION RESPIRATORY (INHALATION) at 20:20

## 2020-01-01 RX ADMIN — INSULIN LISPRO 3 UNITS: 100 INJECTION, SOLUTION INTRAVENOUS; SUBCUTANEOUS at 11:55

## 2020-01-01 RX ADMIN — BUDESONIDE 500 MCG: 0.5 INHALANT RESPIRATORY (INHALATION) at 20:05

## 2020-01-01 RX ADMIN — LEVOTHYROXINE SODIUM 75 MCG: 25 TABLET ORAL at 06:33

## 2020-01-01 RX ADMIN — POTASSIUM BICARBONATE 20 MEQ: 782 TABLET, EFFERVESCENT ORAL at 08:16

## 2020-01-01 RX ADMIN — POTASSIUM CHLORIDE 10 MEQ: 200 INJECTION, SOLUTION INTRAVENOUS at 14:15

## 2020-01-01 RX ADMIN — PREDNISONE 40 MG: 20 TABLET ORAL at 09:31

## 2020-01-01 RX ADMIN — FLUTICASONE FUROATE AND VILANTEROL TRIFENATATE 1 PUFF: 100; 25 POWDER RESPIRATORY (INHALATION) at 08:30

## 2020-01-01 RX ADMIN — FUROSEMIDE 20 MG: 20 TABLET ORAL at 09:42

## 2020-01-01 RX ADMIN — AMIODARONE HYDROCHLORIDE 1 MG/MIN: 1.8 INJECTION, SOLUTION INTRAVENOUS at 15:11

## 2020-01-01 RX ADMIN — ASPIRIN 81 MG CHEWABLE TABLET 81 MG: 81 TABLET CHEWABLE at 08:29

## 2020-01-01 RX ADMIN — OXYCODONE HYDROCHLORIDE AND ACETAMINOPHEN 1 TABLET: 10; 325 TABLET ORAL at 17:24

## 2020-01-01 RX ADMIN — OXYCODONE AND ACETAMINOPHEN 1 TABLET: 5; 325 TABLET ORAL at 12:04

## 2020-01-01 RX ADMIN — ALLOPURINOL 100 MG: 100 TABLET ORAL at 08:25

## 2020-01-01 RX ADMIN — IPRATROPIUM BROMIDE AND ALBUTEROL SULFATE 3 ML: .5; 3 SOLUTION RESPIRATORY (INHALATION) at 19:44

## 2020-01-01 RX ADMIN — ALLOPURINOL 100 MG: 100 TABLET ORAL at 09:24

## 2020-01-01 RX ADMIN — AMIODARONE HYDROCHLORIDE 200 MG: 200 TABLET ORAL at 09:21

## 2020-01-01 RX ADMIN — Medication 1 CAPSULE: at 08:36

## 2020-01-01 RX ADMIN — HYDROCORTISONE SODIUM SUCCINATE 50 MG: 100 INJECTION, POWDER, FOR SOLUTION INTRAMUSCULAR; INTRAVENOUS at 23:38

## 2020-01-01 RX ADMIN — INSULIN LISPRO 4 UNITS: 100 INJECTION, SOLUTION INTRAVENOUS; SUBCUTANEOUS at 12:56

## 2020-01-01 RX ADMIN — Medication: at 01:18

## 2020-01-01 RX ADMIN — FERROUS SULFATE TAB 325 MG (65 MG ELEMENTAL FE) 325 MG: 325 (65 FE) TAB at 09:34

## 2020-01-01 RX ADMIN — AMIODARONE HYDROCHLORIDE 200 MG: 200 TABLET ORAL at 08:30

## 2020-01-01 RX ADMIN — CEFPODOXIME PROXETIL 200 MG: 200 TABLET, FILM COATED ORAL at 13:44

## 2020-01-01 RX ADMIN — NOREPINEPHRINE BITARTRATE 30 MCG/MIN: 1 INJECTION INTRAVENOUS at 21:15

## 2020-01-01 RX ADMIN — PIPERACILLIN SODIUM AND TAZOBACTAM SODIUM 4.5 G: 4; .5 INJECTION, POWDER, LYOPHILIZED, FOR SOLUTION INTRAVENOUS at 05:27

## 2020-01-01 RX ADMIN — SODIUM CHLORIDE 40 MG: 9 INJECTION, SOLUTION INTRAMUSCULAR; INTRAVENOUS; SUBCUTANEOUS at 20:36

## 2020-01-01 RX ADMIN — Medication 16 MCG/MIN: at 09:39

## 2020-01-01 RX ADMIN — OXYCODONE HYDROCHLORIDE AND ACETAMINOPHEN 1 TABLET: 10; 325 TABLET ORAL at 19:27

## 2020-01-01 RX ADMIN — VANCOMYCIN HYDROCHLORIDE 1250 MG: 10 INJECTION, POWDER, LYOPHILIZED, FOR SOLUTION INTRAVENOUS at 00:02

## 2020-01-01 RX ADMIN — LEVOTHYROXINE SODIUM 75 MCG: 25 TABLET ORAL at 05:04

## 2020-01-01 RX ADMIN — INSULIN LISPRO 4 UNITS: 100 INJECTION, SOLUTION INTRAVENOUS; SUBCUTANEOUS at 11:40

## 2020-01-01 RX ADMIN — IOPAMIDOL 70 ML: 612 INJECTION, SOLUTION INTRAVENOUS at 10:06

## 2020-01-01 RX ADMIN — CEFEPIME 2 G: 2 INJECTION, POWDER, FOR SOLUTION INTRAVENOUS at 22:27

## 2020-01-01 RX ADMIN — MIDAZOLAM HYDROCHLORIDE 2 MG: 2 INJECTION, SOLUTION INTRAMUSCULAR; INTRAVENOUS at 11:00

## 2020-01-01 RX ADMIN — POTASSIUM CHLORIDE 10 MEQ: 10 INJECTION, SOLUTION INTRAVENOUS at 00:08

## 2020-01-01 RX ADMIN — IPRATROPIUM BROMIDE AND ALBUTEROL SULFATE 3 ML: .5; 3 SOLUTION RESPIRATORY (INHALATION) at 07:44

## 2020-01-01 RX ADMIN — POTASSIUM CHLORIDE: 2 INJECTION, SOLUTION, CONCENTRATE INTRAVENOUS at 21:15

## 2020-01-01 RX ADMIN — PIPERACILLIN SODIUM AND TAZOBACTAM SODIUM 4.5 G: 4; .5 INJECTION, POWDER, LYOPHILIZED, FOR SOLUTION INTRAVENOUS at 17:15

## 2020-01-01 RX ADMIN — PHENYLEPHRINE HYDROCHLORIDE 300 MCG/MIN: 10 INJECTION INTRAVENOUS at 09:41

## 2020-01-01 RX ADMIN — INSULIN LISPRO 4 UNITS: 100 INJECTION, SOLUTION INTRAVENOUS; SUBCUTANEOUS at 17:30

## 2020-01-01 RX ADMIN — OXYCODONE HYDROCHLORIDE AND ACETAMINOPHEN 1 TABLET: 10; 325 TABLET ORAL at 12:01

## 2020-01-01 RX ADMIN — DOCUSATE SODIUM 100 MG: 100 CAPSULE, LIQUID FILLED ORAL at 08:26

## 2020-01-01 RX ADMIN — NOREPINEPHRINE BITARTRATE 30 MCG/MIN: 1 INJECTION INTRAVENOUS at 01:30

## 2020-01-01 RX ADMIN — FENTANYL CITRATE 100 MCG: 50 INJECTION INTRAMUSCULAR; INTRAVENOUS at 12:02

## 2020-01-01 RX ADMIN — POTASSIUM CHLORIDE 20 MEQ: 29.8 INJECTION, SOLUTION INTRAVENOUS at 13:12

## 2020-01-01 RX ADMIN — SODIUM CHLORIDE, SODIUM ACETATE ANHYDROUS, SODIUM GLUCONATE, POTASSIUM CHLORIDE, AND MAGNESIUM CHLORIDE: 526; 222; 502; 37; 30 INJECTION, SOLUTION INTRAVENOUS at 19:00

## 2020-01-01 RX ADMIN — BUSPIRONE HYDROCHLORIDE 15 MG: 10 TABLET ORAL at 09:23

## 2020-01-01 RX ADMIN — BUDESONIDE 500 MCG: 0.5 INHALANT RESPIRATORY (INHALATION) at 17:01

## 2020-01-01 RX ADMIN — HYDROCORTISONE SODIUM SUCCINATE 50 MG: 100 INJECTION, POWDER, FOR SOLUTION INTRAMUSCULAR; INTRAVENOUS at 05:01

## 2020-01-01 RX ADMIN — SODIUM CHLORIDE 1000 MG: 900 INJECTION, SOLUTION INTRAVENOUS at 00:41

## 2020-01-01 RX ADMIN — SODIUM CHLORIDE 1000 MG: 900 INJECTION, SOLUTION INTRAVENOUS at 08:34

## 2020-01-01 RX ADMIN — IPRATROPIUM BROMIDE AND ALBUTEROL SULFATE 3 ML: .5; 3 SOLUTION RESPIRATORY (INHALATION) at 08:09

## 2020-01-01 RX ADMIN — INSULIN LISPRO 2 UNITS: 100 INJECTION, SOLUTION INTRAVENOUS; SUBCUTANEOUS at 17:00

## 2020-01-01 RX ADMIN — ASPIRIN 81 MG 81 MG: 81 TABLET ORAL at 09:25

## 2020-01-01 RX ADMIN — IPRATROPIUM BROMIDE AND ALBUTEROL SULFATE 3 ML: .5; 3 SOLUTION RESPIRATORY (INHALATION) at 23:12

## 2020-01-01 RX ADMIN — MIDAZOLAM HYDROCHLORIDE 2 MG: 2 INJECTION, SOLUTION INTRAMUSCULAR; INTRAVENOUS at 14:06

## 2020-01-01 RX ADMIN — INSULIN LISPRO 4 UNITS: 100 INJECTION, SOLUTION INTRAVENOUS; SUBCUTANEOUS at 17:15

## 2020-01-01 RX ADMIN — MIDODRINE HYDROCHLORIDE 5 MG: 5 TABLET ORAL at 08:00

## 2020-01-01 RX ADMIN — Medication 200 MCG/HR: at 02:37

## 2020-01-01 RX ADMIN — BUSPIRONE HYDROCHLORIDE 5 MG: 5 TABLET ORAL at 17:29

## 2020-01-01 RX ADMIN — HYDROCORTISONE SODIUM SUCCINATE 50 MG: 100 INJECTION, POWDER, FOR SOLUTION INTRAMUSCULAR; INTRAVENOUS at 06:45

## 2020-01-01 RX ADMIN — EPINEPHRINE 0.05 MG: 1 INJECTION, SOLUTION, CONCENTRATE INTRAVENOUS at 14:01

## 2020-01-01 RX ADMIN — METHADONE HYDROCHLORIDE 20 MG: 10 TABLET ORAL at 08:23

## 2020-01-01 RX ADMIN — POTASSIUM BICARBONATE 40 MEQ: 782 TABLET, EFFERVESCENT ORAL at 02:51

## 2020-01-01 RX ADMIN — BUDESONIDE 500 MCG: 0.5 INHALANT RESPIRATORY (INHALATION) at 10:08

## 2020-01-01 RX ADMIN — INSULIN LISPRO 2 UNITS: 100 INJECTION, SOLUTION INTRAVENOUS; SUBCUTANEOUS at 08:23

## 2020-01-01 RX ADMIN — PANTOPRAZOLE SODIUM 40 MG: 40 TABLET, DELAYED RELEASE ORAL at 06:48

## 2020-01-01 RX ADMIN — SODIUM CHLORIDE, SODIUM ACETATE ANHYDROUS, SODIUM GLUCONATE, POTASSIUM CHLORIDE, AND MAGNESIUM CHLORIDE 75 ML: 526; 222; 502; 37; 30 INJECTION, SOLUTION INTRAVENOUS at 07:34

## 2020-01-01 RX ADMIN — AMIODARONE HYDROCHLORIDE 200 MG: 200 TABLET ORAL at 09:23

## 2020-01-01 RX ADMIN — FERROUS SULFATE TAB 325 MG (65 MG ELEMENTAL FE) 325 MG: 325 (65 FE) TAB at 08:27

## 2020-01-01 RX ADMIN — AMIODARONE HYDROCHLORIDE 200 MG: 200 TABLET ORAL at 08:09

## 2020-01-01 RX ADMIN — BUDESONIDE 500 MCG: 0.5 INHALANT RESPIRATORY (INHALATION) at 23:51

## 2020-01-01 RX ADMIN — Medication 250 MG: at 08:32

## 2020-01-01 RX ADMIN — Medication 200 MCG/HR: at 18:09

## 2020-01-01 RX ADMIN — LEVOTHYROXINE SODIUM 75 MCG: 25 TABLET ORAL at 05:32

## 2020-01-01 RX ADMIN — PREDNISONE 30 MG: 10 TABLET ORAL at 09:23

## 2020-01-01 RX ADMIN — OXYCODONE AND ACETAMINOPHEN 1 TABLET: 5; 325 TABLET ORAL at 11:51

## 2020-01-01 RX ADMIN — SODIUM CHLORIDE 1000 ML: 900 INJECTION, SOLUTION INTRAVENOUS at 11:22

## 2020-01-01 RX ADMIN — THERA TABS 1 TABLET: TAB at 09:02

## 2020-01-01 RX ADMIN — FUROSEMIDE 20 MG: 10 INJECTION, SOLUTION INTRAMUSCULAR; INTRAVENOUS at 11:15

## 2020-01-01 RX ADMIN — IPRATROPIUM BROMIDE AND ALBUTEROL SULFATE 3 ML: .5; 3 SOLUTION RESPIRATORY (INHALATION) at 03:18

## 2020-01-01 RX ADMIN — PANTOPRAZOLE SODIUM 40 MG: 40 TABLET, DELAYED RELEASE ORAL at 06:42

## 2020-01-01 RX ADMIN — SODIUM CHLORIDE SOLN NEBU 3% 4 ML: 3 NEBU SOLN at 23:51

## 2020-01-01 RX ADMIN — HYDRALAZINE HYDROCHLORIDE 10 MG: 20 INJECTION INTRAMUSCULAR; INTRAVENOUS at 03:33

## 2020-01-01 RX ADMIN — INSULIN GLARGINE 15 UNITS: 100 INJECTION, SOLUTION SUBCUTANEOUS at 21:25

## 2020-01-01 RX ADMIN — LEVOTHYROXINE SODIUM 100 MCG: 100 TABLET ORAL at 08:36

## 2020-01-01 RX ADMIN — POTASSIUM CHLORIDE 20 MEQ: 29.8 INJECTION, SOLUTION INTRAVENOUS at 09:15

## 2020-01-01 RX ADMIN — CHOLESTYRAMINE 4 G: 4 POWDER, FOR SUSPENSION ORAL at 17:18

## 2020-01-01 RX ADMIN — POTASSIUM CHLORIDE 10 MEQ: 10 INJECTION, SOLUTION INTRAVENOUS at 23:18

## 2020-01-01 RX ADMIN — LIDOCAINE HYDROCHLORIDE: 10 INJECTION, SOLUTION EPIDURAL; INFILTRATION; INTRACAUDAL; PERINEURAL at 09:18

## 2020-01-01 RX ADMIN — INSULIN LISPRO 3 UNITS: 100 INJECTION, SOLUTION INTRAVENOUS; SUBCUTANEOUS at 12:34

## 2020-01-01 RX ADMIN — SODIUM CHLORIDE, SODIUM LACTATE, POTASSIUM CHLORIDE, AND CALCIUM CHLORIDE 1000 ML: 600; 310; 30; 20 INJECTION, SOLUTION INTRAVENOUS at 22:50

## 2020-01-01 RX ADMIN — MIDAZOLAM HYDROCHLORIDE 2 MG: 2 INJECTION, SOLUTION INTRAMUSCULAR; INTRAVENOUS at 04:02

## 2020-01-01 RX ADMIN — INSULIN LISPRO 2 UNITS: 100 INJECTION, SOLUTION INTRAVENOUS; SUBCUTANEOUS at 09:10

## 2020-01-01 RX ADMIN — APIXABAN 5 MG: 5 TABLET, FILM COATED ORAL at 09:44

## 2020-01-01 RX ADMIN — ARIPIPRAZOLE 10 MG: 10 TABLET ORAL at 09:23

## 2020-01-01 RX ADMIN — CEFEPIME 2 G: 2 INJECTION, POWDER, FOR SOLUTION INTRAVENOUS at 00:42

## 2020-01-01 RX ADMIN — IPRATROPIUM BROMIDE AND ALBUTEROL SULFATE 3 ML: .5; 3 SOLUTION RESPIRATORY (INHALATION) at 00:38

## 2020-01-01 RX ADMIN — ASPIRIN 81 MG 81 MG: 81 TABLET ORAL at 08:26

## 2020-01-01 RX ADMIN — BARIUM SULFATE 30 ML: 400 SUSPENSION ORAL at 12:50

## 2020-01-01 RX ADMIN — AMIODARONE HYDROCHLORIDE 200 MG: 200 TABLET ORAL at 12:14

## 2020-01-01 RX ADMIN — ARIPIPRAZOLE 10 MG: 10 TABLET ORAL at 09:44

## 2020-01-01 RX ADMIN — AMIODARONE HYDROCHLORIDE 150 MG: 1.5 INJECTION, SOLUTION INTRAVENOUS at 15:00

## 2020-01-01 RX ADMIN — LEVOTHYROXINE SODIUM 100 MCG: 100 TABLET ORAL at 06:47

## 2020-01-01 RX ADMIN — ROSUVASTATIN CALCIUM 5 MG: 10 TABLET, FILM COATED ORAL at 21:37

## 2020-01-01 RX ADMIN — MAGNESIUM SULFATE HEPTAHYDRATE 2 G: 40 INJECTION, SOLUTION INTRAVENOUS at 17:00

## 2020-01-01 RX ADMIN — CEFEPIME 2 G: 2 INJECTION, POWDER, FOR SOLUTION INTRAVENOUS at 14:20

## 2020-01-01 RX ADMIN — ALBUTEROL SULFATE 1 PUFF: 90 AEROSOL, METERED RESPIRATORY (INHALATION) at 15:13

## 2020-01-01 RX ADMIN — PANTOPRAZOLE SODIUM 40 MG: 40 TABLET, DELAYED RELEASE ORAL at 08:16

## 2020-01-01 RX ADMIN — Medication 10 ML: at 17:25

## 2020-01-01 RX ADMIN — PANTOPRAZOLE SODIUM 40 MG: 40 TABLET, DELAYED RELEASE ORAL at 16:30

## 2020-01-01 RX ADMIN — CEFEPIME 2 G: 2 INJECTION, POWDER, FOR SOLUTION INTRAVENOUS at 22:21

## 2020-01-01 RX ADMIN — PANTOPRAZOLE SODIUM 40 MG: 40 INJECTION, POWDER, FOR SOLUTION INTRAVENOUS at 22:28

## 2020-01-01 RX ADMIN — AMIODARONE HYDROCHLORIDE 200 MG: 200 TABLET ORAL at 09:02

## 2020-01-01 RX ADMIN — LEVOTHYROXINE SODIUM 75 MCG: 25 TABLET ORAL at 06:21

## 2020-01-01 RX ADMIN — LEVOTHYROXINE SODIUM ANHYDROUS 75 MCG: 100 INJECTION, POWDER, LYOPHILIZED, FOR SOLUTION INTRAVENOUS at 10:04

## 2020-01-01 RX ADMIN — METHOCARBAMOL 500 MG: 500 TABLET, FILM COATED ORAL at 21:37

## 2020-01-01 RX ADMIN — PANTOPRAZOLE SODIUM 40 MG: 40 TABLET, DELAYED RELEASE ORAL at 09:44

## 2020-01-01 RX ADMIN — CALCIUM CARBONATE (ANTACID) CHEW TAB 500 MG 200 MG: 500 CHEW TAB at 05:48

## 2020-01-01 RX ADMIN — ROFLUMILAST 250 MCG: 500 TABLET ORAL at 09:27

## 2020-01-01 RX ADMIN — OXYBUTYNIN CHLORIDE 5 MG: 5 TABLET ORAL at 08:29

## 2020-01-01 RX ADMIN — Medication 10 ML: at 16:25

## 2020-01-01 RX ADMIN — PANTOPRAZOLE SODIUM 40 MG: 40 INJECTION, POWDER, FOR SOLUTION INTRAVENOUS at 22:22

## 2020-01-01 RX ADMIN — OXYCODONE AND ACETAMINOPHEN 1 TABLET: 5; 325 TABLET ORAL at 05:46

## 2020-01-01 RX ADMIN — IPRATROPIUM BROMIDE AND ALBUTEROL SULFATE 3 ML: .5; 3 SOLUTION RESPIRATORY (INHALATION) at 08:56

## 2020-01-01 RX ADMIN — HEPARIN SODIUM 886.8 UNITS/HR: 10000 INJECTION, SOLUTION INTRAVENOUS at 04:36

## 2020-01-01 RX ADMIN — AMIODARONE HYDROCHLORIDE 200 MG: 200 TABLET ORAL at 18:00

## 2020-01-01 RX ADMIN — SENNOSIDES AND DOCUSATE SODIUM 2 TABLET: 8.6; 5 TABLET ORAL at 18:09

## 2020-01-01 RX ADMIN — ALLOPURINOL 100 MG: 100 TABLET ORAL at 08:12

## 2020-01-01 RX ADMIN — PREDNISONE 30 MG: 10 TABLET ORAL at 08:31

## 2020-01-01 RX ADMIN — GLYCOPYRROLATE AND FORMOTEROL FUMARATE 2 PUFF: 9; 4.8 AEROSOL, METERED RESPIRATORY (INHALATION) at 20:33

## 2020-01-01 RX ADMIN — PANTOPRAZOLE SODIUM 40 MG: 40 TABLET, DELAYED RELEASE ORAL at 15:52

## 2020-01-01 RX ADMIN — PHENYLEPHRINE HYDROCHLORIDE 150 MCG/MIN: 10 INJECTION INTRAVENOUS at 15:06

## 2020-01-01 RX ADMIN — Medication 200 MCG/HR: at 15:00

## 2020-01-01 RX ADMIN — PANTOPRAZOLE SODIUM 40 MG: 40 TABLET, DELAYED RELEASE ORAL at 17:21

## 2020-01-01 RX ADMIN — ALBUTEROL SULFATE 1 PUFF: 90 AEROSOL, METERED RESPIRATORY (INHALATION) at 12:58

## 2020-01-01 RX ADMIN — ROFLUMILAST 500 MCG: 500 TABLET ORAL at 08:35

## 2020-01-01 RX ADMIN — Medication 250 MG: at 08:51

## 2020-01-01 RX ADMIN — OXYCODONE HYDROCHLORIDE AND ACETAMINOPHEN 1 TABLET: 10; 325 TABLET ORAL at 02:16

## 2020-01-01 RX ADMIN — BUDESONIDE 500 MCG: 0.5 INHALANT RESPIRATORY (INHALATION) at 08:53

## 2020-01-01 RX ADMIN — PREDNISONE 30 MG: 10 TABLET ORAL at 08:48

## 2020-01-01 RX ADMIN — CEFEPIME 1 G: 1 INJECTION, POWDER, FOR SOLUTION INTRAMUSCULAR; INTRAVENOUS at 05:34

## 2020-01-01 RX ADMIN — IPRATROPIUM BROMIDE AND ALBUTEROL SULFATE 3 ML: .5; 3 SOLUTION RESPIRATORY (INHALATION) at 19:51

## 2020-01-01 RX ADMIN — PANTOPRAZOLE SODIUM 40 MG: 40 INJECTION, POWDER, FOR SOLUTION INTRAVENOUS at 08:57

## 2020-01-01 RX ADMIN — OXYCODONE HYDROCHLORIDE AND ACETAMINOPHEN 1 TABLET: 10; 325 TABLET ORAL at 11:35

## 2020-01-01 RX ADMIN — INSULIN GLARGINE 40 UNITS: 100 INJECTION, SOLUTION SUBCUTANEOUS at 09:10

## 2020-01-01 RX ADMIN — FENTANYL CITRATE 50 MCG: 50 INJECTION INTRAMUSCULAR; INTRAVENOUS at 16:43

## 2020-01-01 RX ADMIN — BUSPIRONE HYDROCHLORIDE 5 MG: 5 TABLET ORAL at 16:29

## 2020-01-01 RX ADMIN — INSULIN LISPRO 6 UNITS: 100 INJECTION, SOLUTION INTRAVENOUS; SUBCUTANEOUS at 08:29

## 2020-01-01 RX ADMIN — PROPOFOL 50 MCG/KG/MIN: 10 INJECTION, EMULSION INTRAVENOUS at 06:55

## 2020-01-01 RX ADMIN — METHOCARBAMOL 500 MG: 500 TABLET, FILM COATED ORAL at 06:51

## 2020-01-01 RX ADMIN — APIXABAN 5 MG: 5 TABLET, FILM COATED ORAL at 18:58

## 2020-01-01 RX ADMIN — SPIRONOLACTONE 25 MG: 25 TABLET ORAL at 17:08

## 2020-01-01 RX ADMIN — OXYBUTYNIN CHLORIDE 5 MG: 5 TABLET ORAL at 08:58

## 2020-01-01 RX ADMIN — VASOPRESSIN 0.03 UNITS/MIN: 20 INJECTION INTRAVENOUS at 19:00

## 2020-01-01 RX ADMIN — INSULIN GLARGINE 10 UNITS: 100 INJECTION, SOLUTION SUBCUTANEOUS at 08:30

## 2020-01-01 RX ADMIN — PANTOPRAZOLE SODIUM 40 MG: 40 TABLET, DELAYED RELEASE ORAL at 17:25

## 2020-01-01 RX ADMIN — ALBUTEROL SULFATE 5 MG: 2.5 SOLUTION RESPIRATORY (INHALATION) at 13:08

## 2020-01-01 RX ADMIN — CEFEPIME 2 G: 2 INJECTION, POWDER, FOR SOLUTION INTRAVENOUS at 09:48

## 2020-01-01 RX ADMIN — METHOCARBAMOL 500 MG: 500 TABLET, FILM COATED ORAL at 13:38

## 2020-01-01 RX ADMIN — ALBUTEROL SULFATE 1 PUFF: 90 AEROSOL, METERED RESPIRATORY (INHALATION) at 07:44

## 2020-01-01 RX ADMIN — ROSUVASTATIN CALCIUM 5 MG: 10 TABLET, FILM COATED ORAL at 21:49

## 2020-01-01 RX ADMIN — IPRATROPIUM BROMIDE AND ALBUTEROL SULFATE 3 ML: .5; 3 SOLUTION RESPIRATORY (INHALATION) at 12:09

## 2020-01-01 RX ADMIN — INSULIN GLARGINE 25 UNITS: 100 INJECTION, SOLUTION SUBCUTANEOUS at 09:00

## 2020-01-01 RX ADMIN — Medication 10 ML: at 08:19

## 2020-01-01 RX ADMIN — Medication 30 ML: at 08:20

## 2020-01-01 RX ADMIN — FAMOTIDINE 20 MG: 20 TABLET, FILM COATED ORAL at 17:20

## 2020-01-01 RX ADMIN — CALCIUM CITRATE 200 MG (950 MG) TABLET 200 MG: at 08:47

## 2020-01-01 RX ADMIN — HYDROCORTISONE SODIUM SUCCINATE 50 MG: 100 INJECTION, POWDER, FOR SOLUTION INTRAMUSCULAR; INTRAVENOUS at 09:05

## 2020-01-01 RX ADMIN — THERA TABS 1 TABLET: TAB at 09:23

## 2020-01-01 RX ADMIN — NOREPINEPHRINE BITARTRATE 3 MCG/MIN: 1 INJECTION INTRAVENOUS at 00:01

## 2020-01-01 RX ADMIN — OXYCODONE AND ACETAMINOPHEN 1 TABLET: 5; 325 TABLET ORAL at 14:13

## 2020-01-01 RX ADMIN — FUROSEMIDE 20 MG: 20 TABLET ORAL at 08:35

## 2020-01-01 RX ADMIN — PREDNISONE 30 MG: 20 TABLET ORAL at 09:10

## 2020-01-01 RX ADMIN — PIPERACILLIN AND TAZOBACTAM 3.38 G: 3; .375 INJECTION, POWDER, LYOPHILIZED, FOR SOLUTION INTRAVENOUS at 23:02

## 2020-01-01 RX ADMIN — PANTOPRAZOLE SODIUM 40 MG: 40 TABLET, DELAYED RELEASE ORAL at 17:18

## 2020-01-01 RX ADMIN — Medication 5 TABLET: at 08:46

## 2020-01-01 RX ADMIN — BUSPIRONE HYDROCHLORIDE 5 MG: 5 TABLET ORAL at 08:59

## 2020-01-01 RX ADMIN — INSULIN GLARGINE 40 UNITS: 100 INJECTION, SOLUTION SUBCUTANEOUS at 09:32

## 2020-01-01 RX ADMIN — FERROUS SULFATE TAB 325 MG (65 MG ELEMENTAL FE) 325 MG: 325 (65 FE) TAB at 08:46

## 2020-01-01 RX ADMIN — Medication 5 TABLET: at 08:13

## 2020-01-01 RX ADMIN — HYDROCORTISONE SODIUM SUCCINATE 50 MG: 100 INJECTION, POWDER, FOR SOLUTION INTRAMUSCULAR; INTRAVENOUS at 08:57

## 2020-01-01 RX ADMIN — PREDNISONE 30 MG: 10 TABLET ORAL at 08:29

## 2020-01-01 RX ADMIN — SERTRALINE HYDROCHLORIDE 50 MG: 50 TABLET ORAL at 08:33

## 2020-01-01 RX ADMIN — PIPERACILLIN SODIUM AND TAZOBACTAM SODIUM 4.5 G: 4; .5 INJECTION, POWDER, LYOPHILIZED, FOR SOLUTION INTRAVENOUS at 17:42

## 2020-01-01 RX ADMIN — LEVOTHYROXINE SODIUM 100 MCG: 0.1 TABLET ORAL at 05:08

## 2020-01-01 RX ADMIN — LEVOTHYROXINE SODIUM 75 MCG: 25 TABLET ORAL at 05:34

## 2020-01-01 RX ADMIN — INSULIN GLARGINE 10 UNITS: 100 INJECTION, SOLUTION SUBCUTANEOUS at 09:00

## 2020-01-01 RX ADMIN — BUDESONIDE 500 MCG: 0.5 INHALANT RESPIRATORY (INHALATION) at 23:13

## 2020-01-01 RX ADMIN — AMIODARONE HYDROCHLORIDE 200 MG: 200 TABLET ORAL at 18:28

## 2020-01-01 RX ADMIN — OXYCODONE HYDROCHLORIDE 5 MG: 5 TABLET ORAL at 17:52

## 2020-01-01 RX ADMIN — SERTRALINE HYDROCHLORIDE 50 MG: 50 TABLET ORAL at 09:31

## 2020-01-01 RX ADMIN — INSULIN LISPRO 9 UNITS: 100 INJECTION, SOLUTION INTRAVENOUS; SUBCUTANEOUS at 17:07

## 2020-01-01 RX ADMIN — HYDROCORTISONE SODIUM SUCCINATE 50 MG: 100 INJECTION, POWDER, FOR SOLUTION INTRAMUSCULAR; INTRAVENOUS at 08:20

## 2020-01-01 RX ADMIN — ALBUMIN (HUMAN) 25 G: 12.5 INJECTION, SOLUTION INTRAVENOUS at 09:10

## 2020-01-01 RX ADMIN — LEVOTHYROXINE SODIUM 100 MCG: 100 TABLET ORAL at 09:30

## 2020-01-01 RX ADMIN — HEPARIN SODIUM 5000 UNITS: 5000 INJECTION INTRAVENOUS; SUBCUTANEOUS at 12:43

## 2020-01-01 RX ADMIN — Medication 10 ML: at 01:39

## 2020-01-01 RX ADMIN — ROSUVASTATIN CALCIUM 5 MG: 10 TABLET, FILM COATED ORAL at 21:03

## 2020-01-01 RX ADMIN — SENNOSIDES AND DOCUSATE SODIUM 2 TABLET: 8.6; 5 TABLET ORAL at 17:49

## 2020-01-01 RX ADMIN — OXYCODONE AND ACETAMINOPHEN 1 TABLET: 5; 325 TABLET ORAL at 21:55

## 2020-01-01 RX ADMIN — FLUTICASONE FUROATE AND VILANTEROL TRIFENATATE 1 PUFF: 100; 25 POWDER RESPIRATORY (INHALATION) at 08:47

## 2020-01-01 RX ADMIN — CHLORHEXIDINE GLUCONATE 0.12% ORAL RINSE 10 ML: 1.2 LIQUID ORAL at 08:20

## 2020-01-01 RX ADMIN — POTASSIUM CHLORIDE: 2 INJECTION, SOLUTION, CONCENTRATE INTRAVENOUS at 19:00

## 2020-01-01 RX ADMIN — POTASSIUM CHLORIDE 10 MEQ: 200 INJECTION, SOLUTION INTRAVENOUS at 12:00

## 2020-01-01 RX ADMIN — ASPIRIN 81 MG 81 MG: 81 TABLET ORAL at 08:45

## 2020-01-01 RX ADMIN — LEVOTHYROXINE SODIUM 100 MCG: 0.1 TABLET ORAL at 06:09

## 2020-01-01 RX ADMIN — IPRATROPIUM BROMIDE AND ALBUTEROL SULFATE 3 ML: .5; 3 SOLUTION RESPIRATORY (INHALATION) at 19:42

## 2020-01-01 RX ADMIN — SODIUM CHLORIDE 1000 MG: 900 INJECTION, SOLUTION INTRAVENOUS at 08:38

## 2020-01-01 RX ADMIN — SODIUM CHLORIDE 1000 MG: 900 INJECTION, SOLUTION INTRAVENOUS at 11:08

## 2020-01-01 RX ADMIN — PIPERACILLIN AND TAZOBACTAM 3.38 G: 3; .375 INJECTION, POWDER, LYOPHILIZED, FOR SOLUTION INTRAVENOUS at 18:09

## 2020-01-01 RX ADMIN — FAMOTIDINE 20 MG: 20 TABLET, FILM COATED ORAL at 10:39

## 2020-01-01 RX ADMIN — INSULIN LISPRO 3 UNITS: 100 INJECTION, SOLUTION INTRAVENOUS; SUBCUTANEOUS at 17:59

## 2020-01-01 RX ADMIN — CHOLESTYRAMINE 4 G: 4 POWDER, FOR SUSPENSION ORAL at 18:09

## 2020-01-01 RX ADMIN — IPRATROPIUM BROMIDE AND ALBUTEROL SULFATE 3 ML: .5; 3 SOLUTION RESPIRATORY (INHALATION) at 07:52

## 2020-01-01 RX ADMIN — POLYETHYLENE GLYCOL (3350) 17 G: 17 POWDER, FOR SOLUTION ORAL at 09:05

## 2020-01-01 RX ADMIN — INSULIN GLARGINE 10 UNITS: 100 INJECTION, SOLUTION SUBCUTANEOUS at 09:43

## 2020-01-01 RX ADMIN — MIDODRINE HYDROCHLORIDE 5 MG: 5 TABLET ORAL at 17:04

## 2020-01-01 RX ADMIN — INSULIN GLARGINE 15 UNITS: 100 INJECTION, SOLUTION SUBCUTANEOUS at 22:26

## 2020-01-01 RX ADMIN — POLYETHYLENE GLYCOL (3350) 17 G: 17 POWDER, FOR SOLUTION ORAL at 08:59

## 2020-01-01 RX ADMIN — PIPERACILLIN SODIUM AND TAZOBACTAM SODIUM 4.5 G: 4; .5 INJECTION, POWDER, LYOPHILIZED, FOR SOLUTION INTRAVENOUS at 19:14

## 2020-01-01 RX ADMIN — FENTANYL CITRATE 100 MCG: 50 INJECTION INTRAMUSCULAR; INTRAVENOUS at 05:42

## 2020-01-01 RX ADMIN — POTASSIUM CHLORIDE: 2 INJECTION, SOLUTION, CONCENTRATE INTRAVENOUS at 20:02

## 2020-01-01 RX ADMIN — Medication 5 TABLET: at 08:32

## 2020-01-01 RX ADMIN — INSULIN GLARGINE 16 UNITS: 100 INJECTION, SOLUTION SUBCUTANEOUS at 08:52

## 2020-01-01 RX ADMIN — MAGNESIUM SULFATE HEPTAHYDRATE 2 G: 40 INJECTION, SOLUTION INTRAVENOUS at 15:29

## 2020-01-01 RX ADMIN — POTASSIUM BICARBONATE 40 MEQ: 782 TABLET, EFFERVESCENT ORAL at 12:00

## 2020-01-01 RX ADMIN — METHOCARBAMOL 500 MG: 500 TABLET, FILM COATED ORAL at 22:03

## 2020-01-01 RX ADMIN — BUDESONIDE 500 MCG: 0.5 INHALANT RESPIRATORY (INHALATION) at 09:05

## 2020-01-01 RX ADMIN — ASPIRIN 81 MG CHEWABLE TABLET 81 MG: 81 TABLET CHEWABLE at 09:27

## 2020-01-01 RX ADMIN — FUROSEMIDE 20 MG: 20 TABLET ORAL at 08:37

## 2020-01-01 RX ADMIN — FERROUS SULFATE TAB 325 MG (65 MG ELEMENTAL FE) 325 MG: 325 (65 FE) TAB at 10:04

## 2020-01-01 RX ADMIN — Medication 30 ML: at 08:59

## 2020-01-01 RX ADMIN — IPRATROPIUM BROMIDE AND ALBUTEROL SULFATE 3 ML: .5; 3 SOLUTION RESPIRATORY (INHALATION) at 12:23

## 2020-01-01 RX ADMIN — Medication 1 CAPSULE: at 08:37

## 2020-01-01 RX ADMIN — ACETAMINOPHEN 325 MG: 325 TABLET ORAL at 09:53

## 2020-01-01 RX ADMIN — PANTOPRAZOLE SODIUM 40 MG: 40 INJECTION, POWDER, FOR SOLUTION INTRAVENOUS at 21:09

## 2020-01-01 RX ADMIN — HYDROCORTISONE SODIUM SUCCINATE 50 MG: 100 INJECTION, POWDER, FOR SOLUTION INTRAMUSCULAR; INTRAVENOUS at 11:34

## 2020-01-01 RX ADMIN — SERTRALINE HYDROCHLORIDE 50 MG: 50 TABLET ORAL at 10:07

## 2020-01-01 RX ADMIN — OXYCODONE HYDROCHLORIDE AND ACETAMINOPHEN 1 TABLET: 10; 325 TABLET ORAL at 18:30

## 2020-01-01 RX ADMIN — GUAIFENESIN 600 MG: 600 TABLET, EXTENDED RELEASE ORAL at 08:40

## 2020-01-01 RX ADMIN — SODIUM CHLORIDE 1000 MG: 900 INJECTION, SOLUTION INTRAVENOUS at 12:27

## 2020-01-01 RX ADMIN — Medication 75 MCG/HR: at 21:55

## 2020-01-01 RX ADMIN — SENNOSIDES AND DOCUSATE SODIUM 2 TABLET: 8.6; 5 TABLET ORAL at 17:24

## 2020-01-01 RX ADMIN — PANTOPRAZOLE SODIUM 40 MG: 40 TABLET, DELAYED RELEASE ORAL at 06:10

## 2020-01-01 RX ADMIN — LEVOTHYROXINE SODIUM 100 MCG: 100 TABLET ORAL at 06:55

## 2020-01-01 RX ADMIN — VANCOMYCIN HYDROCHLORIDE 1250 MG: 10 INJECTION, POWDER, LYOPHILIZED, FOR SOLUTION INTRAVENOUS at 04:13

## 2020-01-01 RX ADMIN — PANTOPRAZOLE SODIUM 40 MG: 40 INJECTION, POWDER, FOR SOLUTION INTRAVENOUS at 22:11

## 2020-01-01 RX ADMIN — INSULIN LISPRO 6 UNITS: 100 INJECTION, SOLUTION INTRAVENOUS; SUBCUTANEOUS at 08:22

## 2020-01-01 RX ADMIN — SODIUM CHLORIDE, SODIUM ACETATE ANHYDROUS, SODIUM GLUCONATE, POTASSIUM CHLORIDE, AND MAGNESIUM CHLORIDE: 526; 222; 502; 37; 30 INJECTION, SOLUTION INTRAVENOUS at 01:20

## 2020-01-01 RX ADMIN — Medication 200 MCG/HR: at 12:00

## 2020-01-01 RX ADMIN — NALOXONE HYDROCHLORIDE 2 MG: 1 INJECTION PARENTERAL at 11:41

## 2020-01-01 RX ADMIN — DIVALPROEX SODIUM 500 MG: 250 TABLET, DELAYED RELEASE ORAL at 22:03

## 2020-01-01 RX ADMIN — ROSUVASTATIN CALCIUM 5 MG: 10 TABLET, FILM COATED ORAL at 22:03

## 2020-01-01 RX ADMIN — POTASSIUM CHLORIDE 10 MEQ: 10 INJECTION, SOLUTION INTRAVENOUS at 07:00

## 2020-01-01 RX ADMIN — PIPERACILLIN SODIUM AND TAZOBACTAM SODIUM 4.5 G: 4; .5 INJECTION, POWDER, LYOPHILIZED, FOR SOLUTION INTRAVENOUS at 11:21

## 2020-01-01 RX ADMIN — ALLOPURINOL 100 MG: 100 TABLET ORAL at 09:42

## 2020-01-01 RX ADMIN — POTASSIUM BICARBONATE 20 MEQ: 782 TABLET, EFFERVESCENT ORAL at 09:00

## 2020-01-01 RX ADMIN — DIVALPROEX SODIUM 500 MG: 250 TABLET, DELAYED RELEASE ORAL at 21:26

## 2020-01-01 RX ADMIN — ASPIRIN 81 MG 81 MG: 81 TABLET ORAL at 08:53

## 2020-01-01 RX ADMIN — FERROUS SULFATE TAB 325 MG (65 MG ELEMENTAL FE) 325 MG: 325 (65 FE) TAB at 10:40

## 2020-01-01 RX ADMIN — BUSPIRONE HYDROCHLORIDE 5 MG: 5 TABLET ORAL at 08:30

## 2020-01-01 RX ADMIN — INSULIN LISPRO 3 UNITS: 100 INJECTION, SOLUTION INTRAVENOUS; SUBCUTANEOUS at 17:21

## 2020-01-01 RX ADMIN — SODIUM CHLORIDE 100 MG: 9 INJECTION, SOLUTION INTRAVENOUS at 17:14

## 2020-01-01 RX ADMIN — OXYCODONE HYDROCHLORIDE AND ACETAMINOPHEN 1 TABLET: 10; 325 TABLET ORAL at 21:29

## 2020-01-01 RX ADMIN — GUAIFENESIN 600 MG: 600 TABLET, EXTENDED RELEASE ORAL at 22:10

## 2020-01-01 RX ADMIN — CALCIUM CITRATE 200 MG (950 MG) TABLET 200 MG: at 08:51

## 2020-01-01 RX ADMIN — SODIUM CHLORIDE, SODIUM LACTATE, POTASSIUM CHLORIDE, AND CALCIUM CHLORIDE: 600; 310; 30; 20 INJECTION, SOLUTION INTRAVENOUS at 13:33

## 2020-01-01 RX ADMIN — Medication 250 MG: at 08:18

## 2020-01-01 RX ADMIN — PANTOPRAZOLE SODIUM 40 MG: 40 TABLET, DELAYED RELEASE ORAL at 12:28

## 2020-01-01 RX ADMIN — OXYCODONE HYDROCHLORIDE AND ACETAMINOPHEN 1 TABLET: 10; 325 TABLET ORAL at 05:00

## 2020-01-01 RX ADMIN — INSULIN LISPRO 9 UNITS: 100 INJECTION, SOLUTION INTRAVENOUS; SUBCUTANEOUS at 12:01

## 2020-01-01 RX ADMIN — BUDESONIDE 500 MCG: 0.5 INHALANT RESPIRATORY (INHALATION) at 07:36

## 2020-01-01 RX ADMIN — OXYBUTYNIN CHLORIDE 5 MG: 5 TABLET ORAL at 08:37

## 2020-01-01 RX ADMIN — CEFPODOXIME PROXETIL 200 MG: 100 TABLET, FILM COATED ORAL at 12:14

## 2020-01-01 RX ADMIN — OXYCODONE HYDROCHLORIDE AND ACETAMINOPHEN 1 TABLET: 10; 325 TABLET ORAL at 06:33

## 2020-01-01 RX ADMIN — HEPARIN SODIUM 5000 UNITS: 5000 INJECTION INTRAVENOUS; SUBCUTANEOUS at 23:00

## 2020-01-01 RX ADMIN — Medication 10 ML: at 21:43

## 2020-01-01 RX ADMIN — DOCUSATE SODIUM 100 MG: 100 CAPSULE, LIQUID FILLED ORAL at 09:42

## 2020-01-01 RX ADMIN — IPRATROPIUM BROMIDE AND ALBUTEROL SULFATE 3 ML: .5; 3 SOLUTION RESPIRATORY (INHALATION) at 19:47

## 2020-01-01 RX ADMIN — CALCIUM GLUCONATE 2 G: 98 INJECTION, SOLUTION INTRAVENOUS at 18:58

## 2020-01-01 RX ADMIN — SODIUM CHLORIDE 1000 MG: 900 INJECTION, SOLUTION INTRAVENOUS at 03:37

## 2020-01-01 RX ADMIN — INSULIN LISPRO 2 UNITS: 100 INJECTION, SOLUTION INTRAVENOUS; SUBCUTANEOUS at 06:22

## 2020-01-01 RX ADMIN — POTASSIUM BICARBONATE 20 MEQ: 782 TABLET, EFFERVESCENT ORAL at 08:37

## 2020-01-01 RX ADMIN — METHOCARBAMOL 500 MG: 500 TABLET, FILM COATED ORAL at 13:37

## 2020-01-01 RX ADMIN — CALCIUM CITRATE 200 MG (950 MG) TABLET 200 MG: at 08:39

## 2020-01-01 RX ADMIN — HEPARIN SODIUM 5000 UNITS: 5000 INJECTION INTRAVENOUS; SUBCUTANEOUS at 14:49

## 2020-01-01 RX ADMIN — INSULIN LISPRO 2 UNITS: 100 INJECTION, SOLUTION INTRAVENOUS; SUBCUTANEOUS at 16:01

## 2020-01-01 RX ADMIN — PIPERACILLIN SODIUM AND TAZOBACTAM SODIUM 4.5 G: 4; .5 INJECTION, POWDER, LYOPHILIZED, FOR SOLUTION INTRAVENOUS at 05:08

## 2020-01-01 RX ADMIN — PIPERACILLIN SODIUM AND TAZOBACTAM SODIUM 4.5 G: 4; .5 INJECTION, POWDER, LYOPHILIZED, FOR SOLUTION INTRAVENOUS at 13:17

## 2020-01-01 RX ADMIN — SERTRALINE HYDROCHLORIDE 50 MG: 50 TABLET ORAL at 08:59

## 2020-01-01 RX ADMIN — Medication 10 ML: at 13:11

## 2020-01-01 RX ADMIN — OXYCODONE HYDROCHLORIDE AND ACETAMINOPHEN 1 TABLET: 10; 325 TABLET ORAL at 10:16

## 2020-01-01 RX ADMIN — MAGNESIUM SULFATE HEPTAHYDRATE 2 G: 40 INJECTION, SOLUTION INTRAVENOUS at 08:20

## 2020-01-01 RX ADMIN — PREDNISONE 30 MG: 10 TABLET ORAL at 09:58

## 2020-01-01 RX ADMIN — PANTOPRAZOLE SODIUM 40 MG: 40 TABLET, DELAYED RELEASE ORAL at 09:58

## 2020-01-01 RX ADMIN — INSULIN LISPRO 3 UNITS: 100 INJECTION, SOLUTION INTRAVENOUS; SUBCUTANEOUS at 00:30

## 2020-01-01 RX ADMIN — VANCOMYCIN HYDROCHLORIDE 1750 MG: 10 INJECTION, POWDER, LYOPHILIZED, FOR SOLUTION INTRAVENOUS at 20:17

## 2020-01-01 RX ADMIN — IOPAMIDOL 80 ML: 755 INJECTION, SOLUTION INTRAVENOUS at 16:03

## 2020-01-01 RX ADMIN — PANTOPRAZOLE SODIUM 40 MG: 40 TABLET, DELAYED RELEASE ORAL at 07:04

## 2020-01-01 RX ADMIN — SPIRONOLACTONE 25 MG: 25 TABLET ORAL at 09:23

## 2020-01-01 RX ADMIN — FUROSEMIDE 20 MG: 10 INJECTION, SOLUTION INTRAMUSCULAR; INTRAVENOUS at 21:20

## 2020-01-01 RX ADMIN — ASPIRIN 81 MG CHEWABLE TABLET 324 MG: 81 TABLET CHEWABLE at 04:27

## 2020-01-01 RX ADMIN — GUAIFENESIN 600 MG: 600 TABLET, EXTENDED RELEASE ORAL at 21:35

## 2020-01-01 RX ADMIN — APIXABAN 5 MG: 5 TABLET, FILM COATED ORAL at 18:24

## 2020-01-01 RX ADMIN — Medication 250 MG: at 08:44

## 2020-01-01 RX ADMIN — FLUTICASONE FUROATE AND VILANTEROL TRIFENATATE 1 PUFF: 100; 25 POWDER RESPIRATORY (INHALATION) at 10:41

## 2020-01-01 RX ADMIN — Medication 250 MG: at 08:09

## 2020-01-01 RX ADMIN — INSULIN LISPRO 6 UNITS: 100 INJECTION, SOLUTION INTRAVENOUS; SUBCUTANEOUS at 16:14

## 2020-01-01 RX ADMIN — Medication 5 TABLET: at 11:39

## 2020-01-01 RX ADMIN — LEVOTHYROXINE SODIUM ANHYDROUS 75 MCG: 100 INJECTION, POWDER, LYOPHILIZED, FOR SOLUTION INTRAVENOUS at 11:00

## 2020-01-01 RX ADMIN — ALBUTEROL SULFATE 1 PUFF: 90 AEROSOL, METERED RESPIRATORY (INHALATION) at 01:40

## 2020-01-01 RX ADMIN — APIXABAN 5 MG: 5 TABLET, FILM COATED ORAL at 18:51

## 2020-01-01 RX ADMIN — OXYCODONE AND ACETAMINOPHEN 1 TABLET: 5; 325 TABLET ORAL at 20:18

## 2020-01-01 RX ADMIN — METHOCARBAMOL 500 MG: 500 TABLET, FILM COATED ORAL at 06:04

## 2020-01-01 RX ADMIN — SENNOSIDES AND DOCUSATE SODIUM 2 TABLET: 8.6; 5 TABLET ORAL at 17:14

## 2020-01-01 RX ADMIN — PIPERACILLIN AND TAZOBACTAM 3.38 G: 3; .375 INJECTION, POWDER, LYOPHILIZED, FOR SOLUTION INTRAVENOUS at 12:18

## 2020-01-01 RX ADMIN — LEVOTHYROXINE SODIUM 100 MCG: 100 TABLET ORAL at 06:56

## 2020-01-01 RX ADMIN — CEFPODOXIME PROXETIL 200 MG: 100 TABLET, FILM COATED ORAL at 09:25

## 2020-01-01 RX ADMIN — BUSPIRONE HYDROCHLORIDE 5 MG: 5 TABLET ORAL at 09:43

## 2020-01-01 RX ADMIN — SODIUM CHLORIDE 1000 MG: 900 INJECTION, SOLUTION INTRAVENOUS at 20:37

## 2020-01-01 RX ADMIN — Medication 10 ML: at 21:21

## 2020-01-01 RX ADMIN — METHOCARBAMOL 500 MG: 500 TABLET, FILM COATED ORAL at 14:24

## 2020-01-01 RX ADMIN — Medication 250 MG: at 09:43

## 2020-01-01 RX ADMIN — Medication 75 MCG/HR: at 11:30

## 2020-01-01 RX ADMIN — METHOCARBAMOL 500 MG: 500 TABLET, FILM COATED ORAL at 15:00

## 2020-01-01 RX ADMIN — METHADONE HYDROCHLORIDE 10 MG: 10 CONCENTRATE ORAL at 12:12

## 2020-01-01 RX ADMIN — SODIUM CHLORIDE 1000 ML: 900 INJECTION, SOLUTION INTRAVENOUS at 09:20

## 2020-01-01 RX ADMIN — PANTOPRAZOLE SODIUM 40 MG: 40 INJECTION, POWDER, FOR SOLUTION INTRAVENOUS at 08:46

## 2020-01-01 RX ADMIN — IPRATROPIUM BROMIDE AND ALBUTEROL SULFATE 3 ML: .5; 3 SOLUTION RESPIRATORY (INHALATION) at 11:53

## 2020-01-01 RX ADMIN — PIPERACILLIN SODIUM AND TAZOBACTAM SODIUM 4.5 G: 4; .5 INJECTION, POWDER, LYOPHILIZED, FOR SOLUTION INTRAVENOUS at 17:52

## 2020-01-01 RX ADMIN — SODIUM CHLORIDE, SODIUM ACETATE ANHYDROUS, SODIUM GLUCONATE, POTASSIUM CHLORIDE, AND MAGNESIUM CHLORIDE 1000 ML: 526; 222; 502; 37; 30 INJECTION, SOLUTION INTRAVENOUS at 17:00

## 2020-01-01 RX ADMIN — SODIUM CHLORIDE 1000 MG: 900 INJECTION, SOLUTION INTRAVENOUS at 08:16

## 2020-01-01 RX ADMIN — METHADONE HYDROCHLORIDE 10 MG: 10 CONCENTRATE ORAL at 09:00

## 2020-01-01 RX ADMIN — INSULIN LISPRO 6 UNITS: 100 INJECTION, SOLUTION INTRAVENOUS; SUBCUTANEOUS at 18:00

## 2020-01-01 RX ADMIN — IPRATROPIUM BROMIDE AND ALBUTEROL SULFATE 3 ML: .5; 3 SOLUTION RESPIRATORY (INHALATION) at 08:04

## 2020-01-01 RX ADMIN — POTASSIUM CHLORIDE 10 MEQ: 200 INJECTION, SOLUTION INTRAVENOUS at 15:15

## 2020-01-01 RX ADMIN — PANTOPRAZOLE SODIUM 40 MG: 40 TABLET, DELAYED RELEASE ORAL at 17:08

## 2020-01-01 RX ADMIN — LIDOCAINE HYDROCHLORIDE: 10 INJECTION, SOLUTION EPIDURAL; INFILTRATION; INTRACAUDAL; PERINEURAL at 10:00

## 2020-01-01 RX ADMIN — VASOPRESSIN 0.03 UNITS/MIN: 20 INJECTION INTRAVENOUS at 19:02

## 2020-01-01 RX ADMIN — Medication 400 MG: at 08:29

## 2020-01-01 RX ADMIN — INSULIN LISPRO 4 UNITS: 100 INJECTION, SOLUTION INTRAVENOUS; SUBCUTANEOUS at 08:33

## 2020-01-01 RX ADMIN — PREDNISONE 30 MG: 5 TABLET ORAL at 08:41

## 2020-01-01 RX ADMIN — LEVOTHYROXINE SODIUM ANHYDROUS 75 MCG: 100 INJECTION, POWDER, LYOPHILIZED, FOR SOLUTION INTRAVENOUS at 09:18

## 2020-01-01 RX ADMIN — LEVOTHYROXINE SODIUM 100 MCG: 100 TABLET ORAL at 08:37

## 2020-01-01 RX ADMIN — BUSPIRONE HYDROCHLORIDE 5 MG: 5 TABLET ORAL at 16:30

## 2020-01-01 RX ADMIN — OXYCODONE AND ACETAMINOPHEN 1 TABLET: 5; 325 TABLET ORAL at 12:19

## 2020-01-01 RX ADMIN — GUAIFENESIN 600 MG: 600 TABLET, EXTENDED RELEASE ORAL at 21:04

## 2020-01-01 RX ADMIN — THERA TABS 1 TABLET: TAB at 08:47

## 2020-01-01 RX ADMIN — APIXABAN 5 MG: 5 TABLET, FILM COATED ORAL at 08:09

## 2020-01-01 RX ADMIN — DOCUSATE SODIUM 100 MG: 100 CAPSULE, LIQUID FILLED ORAL at 08:36

## 2020-01-01 RX ADMIN — GUAIFENESIN 600 MG: 600 TABLET, EXTENDED RELEASE ORAL at 08:14

## 2020-01-01 RX ADMIN — BUSPIRONE HYDROCHLORIDE 5 MG: 5 TABLET ORAL at 18:09

## 2020-01-01 RX ADMIN — INSULIN LISPRO 3 UNITS: 100 INJECTION, SOLUTION INTRAVENOUS; SUBCUTANEOUS at 22:00

## 2020-01-01 RX ADMIN — IPRATROPIUM BROMIDE AND ALBUTEROL SULFATE 3 ML: .5; 3 SOLUTION RESPIRATORY (INHALATION) at 15:14

## 2020-01-01 RX ADMIN — FENTANYL CITRATE 100 MCG: 50 INJECTION INTRAMUSCULAR; INTRAVENOUS at 06:45

## 2020-01-01 RX ADMIN — SODIUM CHLORIDE SOLN NEBU 3% 4 ML: 3 NEBU SOLN at 09:44

## 2020-01-01 RX ADMIN — AMIODARONE HYDROCHLORIDE 0.5 MG/MIN: 1.8 INJECTION, SOLUTION INTRAVENOUS at 11:05

## 2020-01-01 RX ADMIN — SODIUM CHLORIDE 1000 MG: 900 INJECTION, SOLUTION INTRAVENOUS at 09:30

## 2020-01-01 RX ADMIN — METHOCARBAMOL 500 MG: 500 TABLET, FILM COATED ORAL at 06:25

## 2020-01-01 RX ADMIN — INSULIN LISPRO 6 UNITS: 100 INJECTION, SOLUTION INTRAVENOUS; SUBCUTANEOUS at 16:45

## 2020-01-01 RX ADMIN — BUDESONIDE 500 MCG: 0.5 INHALANT RESPIRATORY (INHALATION) at 08:25

## 2020-01-01 RX ADMIN — BUDESONIDE 500 MCG: 0.5 INHALANT RESPIRATORY (INHALATION) at 07:18

## 2020-01-01 RX ADMIN — CEFEPIME HYDROCHLORIDE 2 G: 2 INJECTION, POWDER, FOR SOLUTION INTRAVENOUS at 16:30

## 2020-01-01 RX ADMIN — PIPERACILLIN SODIUM AND TAZOBACTAM SODIUM 4.5 G: 4; .5 INJECTION, POWDER, LYOPHILIZED, FOR SOLUTION INTRAVENOUS at 11:11

## 2020-01-01 RX ADMIN — POTASSIUM CHLORIDE: 2 INJECTION, SOLUTION, CONCENTRATE INTRAVENOUS at 18:00

## 2020-01-01 RX ADMIN — POTASSIUM CHLORIDE 10 MEQ: 10 INJECTION, SOLUTION INTRAVENOUS at 00:24

## 2020-01-01 RX ADMIN — OXYCODONE HYDROCHLORIDE 5 MG: 5 TABLET ORAL at 02:23

## 2020-01-01 RX ADMIN — INSULIN LISPRO 6 UNITS: 100 INJECTION, SOLUTION INTRAVENOUS; SUBCUTANEOUS at 16:56

## 2020-01-01 RX ADMIN — HYDROCORTISONE SODIUM SUCCINATE 50 MG: 100 INJECTION, POWDER, FOR SOLUTION INTRAMUSCULAR; INTRAVENOUS at 05:34

## 2020-01-01 RX ADMIN — IPRATROPIUM BROMIDE AND ALBUTEROL SULFATE 3 ML: .5; 3 SOLUTION RESPIRATORY (INHALATION) at 19:13

## 2020-01-01 RX ADMIN — IPRATROPIUM BROMIDE AND ALBUTEROL SULFATE 3 ML: .5; 3 SOLUTION RESPIRATORY (INHALATION) at 23:00

## 2020-01-01 RX ADMIN — BUDESONIDE 500 MCG: 0.5 INHALANT RESPIRATORY (INHALATION) at 19:43

## 2020-01-01 RX ADMIN — INSULIN LISPRO 9 UNITS: 100 INJECTION, SOLUTION INTRAVENOUS; SUBCUTANEOUS at 07:30

## 2020-01-01 RX ADMIN — HEPARIN SODIUM 5000 UNITS: 5000 INJECTION INTRAVENOUS; SUBCUTANEOUS at 06:52

## 2020-01-01 RX ADMIN — LEVOTHYROXINE SODIUM 100 MCG: 0.1 TABLET ORAL at 06:21

## 2020-01-01 RX ADMIN — METHOCARBAMOL 500 MG: 500 TABLET, FILM COATED ORAL at 22:10

## 2020-01-01 RX ADMIN — PIPERACILLIN SODIUM AND TAZOBACTAM SODIUM 4.5 G: 4; .5 INJECTION, POWDER, LYOPHILIZED, FOR SOLUTION INTRAVENOUS at 23:12

## 2020-01-01 RX ADMIN — OXYCODONE AND ACETAMINOPHEN 1 TABLET: 5; 325 TABLET ORAL at 10:45

## 2020-01-01 RX ADMIN — OXYCODONE AND ACETAMINOPHEN 1 TABLET: 5; 325 TABLET ORAL at 06:52

## 2020-01-01 RX ADMIN — HEPARIN SODIUM 4000 UNITS: 1000 INJECTION, SOLUTION INTRAVENOUS; SUBCUTANEOUS at 04:35

## 2020-01-01 RX ADMIN — ACETAZOLAMIDE 250 MG: 250 TABLET ORAL at 09:23

## 2020-01-01 RX ADMIN — INSULIN LISPRO 9 UNITS: 100 INJECTION, SOLUTION INTRAVENOUS; SUBCUTANEOUS at 18:00

## 2020-01-01 RX ADMIN — OXYCODONE AND ACETAMINOPHEN 1 TABLET: 5; 325 TABLET ORAL at 09:31

## 2020-01-01 RX ADMIN — METHADONE HYDROCHLORIDE 20 MG: 10 TABLET ORAL at 08:29

## 2020-01-01 RX ADMIN — METHADONE HYDROCHLORIDE 20 MG: 10 TABLET ORAL at 11:39

## 2020-01-01 RX ADMIN — Medication 75 MCG/HR: at 12:00

## 2020-01-01 RX ADMIN — PREDNISONE 30 MG: 20 TABLET ORAL at 08:16

## 2020-01-01 RX ADMIN — FLUTICASONE FUROATE AND VILANTEROL TRIFENATATE 1 PUFF: 100; 25 POWDER RESPIRATORY (INHALATION) at 08:29

## 2020-01-01 RX ADMIN — INSULIN LISPRO 2 UNITS: 100 INJECTION, SOLUTION INTRAVENOUS; SUBCUTANEOUS at 20:39

## 2020-01-01 RX ADMIN — ALBUTEROL SULFATE 1 PUFF: 90 AEROSOL, METERED RESPIRATORY (INHALATION) at 20:15

## 2020-01-01 RX ADMIN — Medication 75 MCG/HR: at 14:00

## 2020-01-01 RX ADMIN — PREDNISONE 20 MG: 20 TABLET ORAL at 09:33

## 2020-01-01 RX ADMIN — Medication 10 ML: at 06:14

## 2020-01-01 RX ADMIN — LUBIPROSTONE 24 MCG: 24 CAPSULE, GELATIN COATED ORAL at 08:45

## 2020-01-01 RX ADMIN — INSULIN LISPRO 2 UNITS: 100 INJECTION, SOLUTION INTRAVENOUS; SUBCUTANEOUS at 21:58

## 2020-01-01 RX ADMIN — Medication 250 MG: at 09:24

## 2020-01-01 RX ADMIN — PHENYLEPHRINE HYDROCHLORIDE 300 MCG/MIN: 10 INJECTION INTRAVENOUS at 03:34

## 2020-01-01 RX ADMIN — ASPIRIN 81 MG CHEWABLE TABLET 81 MG: 81 TABLET CHEWABLE at 08:39

## 2020-01-01 RX ADMIN — BUDESONIDE 500 MCG: 0.5 INHALANT RESPIRATORY (INHALATION) at 20:12

## 2020-01-01 RX ADMIN — AMIODARONE HYDROCHLORIDE 200 MG: 200 TABLET ORAL at 18:24

## 2020-01-01 RX ADMIN — PIPERACILLIN AND TAZOBACTAM 3.38 G: 3; .375 INJECTION, POWDER, LYOPHILIZED, FOR SOLUTION INTRAVENOUS at 11:17

## 2020-01-01 RX ADMIN — IPRATROPIUM BROMIDE AND ALBUTEROL SULFATE 3 ML: .5; 3 SOLUTION RESPIRATORY (INHALATION) at 20:56

## 2020-01-01 RX ADMIN — Medication 250 MG: at 08:14

## 2020-01-01 RX ADMIN — METHOCARBAMOL 500 MG: 500 TABLET, FILM COATED ORAL at 06:33

## 2020-01-01 RX ADMIN — INSULIN LISPRO 8 UNITS: 100 INJECTION, SOLUTION INTRAVENOUS; SUBCUTANEOUS at 15:51

## 2020-01-01 RX ADMIN — IPRATROPIUM BROMIDE AND ALBUTEROL SULFATE 3 ML: .5; 3 SOLUTION RESPIRATORY (INHALATION) at 13:22

## 2020-01-01 RX ADMIN — FENTANYL CITRATE 100 MCG: 50 INJECTION INTRAMUSCULAR; INTRAVENOUS at 17:52

## 2020-01-01 RX ADMIN — BUDESONIDE 500 MCG: 0.5 INHALANT RESPIRATORY (INHALATION) at 22:03

## 2020-01-01 RX ADMIN — PREDNISONE 30 MG: 10 TABLET ORAL at 10:25

## 2020-01-01 RX ADMIN — Medication 200 MCG: at 13:36

## 2020-01-01 RX ADMIN — Medication 30 ML: at 08:00

## 2020-01-01 RX ADMIN — ALBUTEROL SULFATE 1 PUFF: 90 AEROSOL, METERED RESPIRATORY (INHALATION) at 20:45

## 2020-01-01 RX ADMIN — BUSPIRONE HYDROCHLORIDE 5 MG: 5 TABLET ORAL at 21:25

## 2020-01-01 RX ADMIN — METHOCARBAMOL 500 MG: 500 TABLET, FILM COATED ORAL at 06:02

## 2020-01-01 RX ADMIN — PREDNISONE 20 MG: 20 TABLET ORAL at 16:01

## 2020-01-01 RX ADMIN — Medication 5 TABLET: at 08:58

## 2020-01-01 RX ADMIN — POTASSIUM CHLORIDE 20 MEQ: 29.8 INJECTION, SOLUTION INTRAVENOUS at 05:15

## 2020-01-01 RX ADMIN — DIVALPROEX SODIUM 500 MG: 250 TABLET, DELAYED RELEASE ORAL at 21:34

## 2020-01-01 RX ADMIN — INSULIN LISPRO 4 UNITS: 100 INJECTION, SOLUTION INTRAVENOUS; SUBCUTANEOUS at 09:25

## 2020-01-01 RX ADMIN — IPRATROPIUM BROMIDE AND ALBUTEROL SULFATE 3 ML: .5; 3 SOLUTION RESPIRATORY (INHALATION) at 14:45

## 2020-01-01 RX ADMIN — HEPARIN SODIUM 5000 UNITS: 5000 INJECTION INTRAVENOUS; SUBCUTANEOUS at 11:48

## 2020-01-01 RX ADMIN — LEVOTHYROXINE SODIUM 100 MCG: 0.1 TABLET ORAL at 06:41

## 2020-01-01 RX ADMIN — ASPIRIN 81 MG 81 MG: 81 TABLET ORAL at 08:32

## 2020-01-01 RX ADMIN — POTASSIUM CHLORIDE 10 MEQ: 10 INJECTION, SOLUTION INTRAVENOUS at 21:02

## 2020-01-01 RX ADMIN — AMIODARONE HYDROCHLORIDE 0.5 MG/MIN: 1.8 INJECTION, SOLUTION INTRAVENOUS at 11:00

## 2020-01-01 RX ADMIN — Medication 75 MCG/HR: at 18:59

## 2020-01-01 RX ADMIN — CEFEPIME 2 G: 2 INJECTION, POWDER, FOR SOLUTION INTRAVENOUS at 16:01

## 2020-01-01 RX ADMIN — ALLOPURINOL 100 MG: 100 TABLET ORAL at 08:52

## 2020-01-01 RX ADMIN — PANTOPRAZOLE SODIUM 40 MG: 40 TABLET, DELAYED RELEASE ORAL at 06:01

## 2020-01-01 RX ADMIN — IPRATROPIUM BROMIDE AND ALBUTEROL SULFATE 3 ML: .5; 3 SOLUTION RESPIRATORY (INHALATION) at 03:43

## 2020-01-01 RX ADMIN — INSULIN LISPRO 3 UNITS: 100 INJECTION, SOLUTION INTRAVENOUS; SUBCUTANEOUS at 09:43

## 2020-01-01 RX ADMIN — Medication 40 ML: at 21:02

## 2020-01-01 RX ADMIN — ALBUTEROL SULFATE 2.5 MG: 2.5 SOLUTION RESPIRATORY (INHALATION) at 00:13

## 2020-01-01 RX ADMIN — OXYCODONE HYDROCHLORIDE AND ACETAMINOPHEN 1 TABLET: 10; 325 TABLET ORAL at 21:14

## 2020-01-01 RX ADMIN — ROSUVASTATIN CALCIUM 5 MG: 5 TABLET, COATED ORAL at 20:19

## 2020-01-01 RX ADMIN — ROSUVASTATIN CALCIUM 5 MG: 5 TABLET, COATED ORAL at 22:31

## 2020-01-01 RX ADMIN — APIXABAN 5 MG: 5 TABLET, FILM COATED ORAL at 17:17

## 2020-01-01 RX ADMIN — ALLOPURINOL 100 MG: 100 TABLET ORAL at 08:29

## 2020-01-01 RX ADMIN — ALBUTEROL SULFATE 1 PUFF: 90 AEROSOL, METERED RESPIRATORY (INHALATION) at 04:35

## 2020-01-01 RX ADMIN — IPRATROPIUM BROMIDE AND ALBUTEROL SULFATE 3 ML: .5; 3 SOLUTION RESPIRATORY (INHALATION) at 15:54

## 2020-01-01 RX ADMIN — IPRATROPIUM BROMIDE AND ALBUTEROL SULFATE 3 ML: .5; 3 SOLUTION RESPIRATORY (INHALATION) at 03:07

## 2020-01-01 RX ADMIN — SERTRALINE HYDROCHLORIDE 50 MG: 50 TABLET ORAL at 08:36

## 2020-01-01 RX ADMIN — Medication 200 MCG: at 13:55

## 2020-01-01 RX ADMIN — ALBUTEROL SULFATE: 90 AEROSOL, METERED RESPIRATORY (INHALATION) at 12:58

## 2020-01-01 RX ADMIN — SPIRONOLACTONE 25 MG: 25 TABLET ORAL at 08:29

## 2020-01-01 RX ADMIN — VANCOMYCIN HYDROCHLORIDE 1750 MG: 10 INJECTION, POWDER, LYOPHILIZED, FOR SOLUTION INTRAVENOUS at 12:33

## 2020-01-01 RX ADMIN — LEVOTHYROXINE SODIUM 100 MCG: 100 TABLET ORAL at 08:16

## 2020-01-01 RX ADMIN — FLUTICASONE FUROATE AND VILANTEROL TRIFENATATE 1 PUFF: 100; 25 POWDER RESPIRATORY (INHALATION) at 08:43

## 2020-01-01 RX ADMIN — INSULIN LISPRO 4 UNITS: 100 INJECTION, SOLUTION INTRAVENOUS; SUBCUTANEOUS at 22:49

## 2020-01-01 RX ADMIN — SODIUM CHLORIDE, SODIUM ACETATE ANHYDROUS, SODIUM GLUCONATE, POTASSIUM CHLORIDE, AND MAGNESIUM CHLORIDE 1000 ML: 526; 222; 502; 37; 30 INJECTION, SOLUTION INTRAVENOUS at 15:00

## 2020-01-01 RX ADMIN — INSULIN LISPRO 4 UNITS: 100 INJECTION, SOLUTION INTRAVENOUS; SUBCUTANEOUS at 07:15

## 2020-01-01 RX ADMIN — INSULIN LISPRO 6 UNITS: 100 INJECTION, SOLUTION INTRAVENOUS; SUBCUTANEOUS at 18:46

## 2020-01-01 RX ADMIN — FUROSEMIDE 20 MG: 10 INJECTION, SOLUTION INTRAMUSCULAR; INTRAVENOUS at 23:50

## 2020-01-01 RX ADMIN — SODIUM CHLORIDE 1000 MG: 900 INJECTION, SOLUTION INTRAVENOUS at 00:12

## 2020-01-01 RX ADMIN — FAMOTIDINE 20 MG: 20 TABLET, FILM COATED ORAL at 17:52

## 2020-01-01 RX ADMIN — PANTOPRAZOLE SODIUM 40 MG: 40 INJECTION, POWDER, FOR SOLUTION INTRAVENOUS at 22:42

## 2020-01-01 RX ADMIN — SERTRALINE HYDROCHLORIDE 50 MG: 50 TABLET ORAL at 09:21

## 2020-01-01 RX ADMIN — LUBIPROSTONE 24 MCG: 24 CAPSULE, GELATIN COATED ORAL at 08:42

## 2020-01-01 RX ADMIN — AMIODARONE HYDROCHLORIDE 200 MG: 200 TABLET ORAL at 08:35

## 2020-01-01 RX ADMIN — OXYBUTYNIN CHLORIDE 5 MG: 5 TABLET ORAL at 17:20

## 2020-01-01 RX ADMIN — MIDAZOLAM HYDROCHLORIDE 4 MG: 2 INJECTION, SOLUTION INTRAMUSCULAR; INTRAVENOUS at 12:32

## 2020-01-01 RX ADMIN — INSULIN LISPRO 4 UNITS: 100 INJECTION, SOLUTION INTRAVENOUS; SUBCUTANEOUS at 09:57

## 2020-01-01 RX ADMIN — MAGNESIUM SULFATE HEPTAHYDRATE 2 G: 40 INJECTION, SOLUTION INTRAVENOUS at 15:00

## 2020-01-01 RX ADMIN — AMIODARONE HYDROCHLORIDE 0.5 MG/MIN: 1.8 INJECTION, SOLUTION INTRAVENOUS at 01:25

## 2020-01-01 RX ADMIN — ROFLUMILAST 250 MCG: 500 TABLET ORAL at 09:21

## 2020-01-01 RX ADMIN — IPRATROPIUM BROMIDE AND ALBUTEROL SULFATE 3 ML: .5; 3 SOLUTION RESPIRATORY (INHALATION) at 07:28

## 2020-01-01 RX ADMIN — MAGNESIUM SULFATE HEPTAHYDRATE 1 G: 1 INJECTION, SOLUTION INTRAVENOUS at 17:00

## 2020-01-01 RX ADMIN — Medication 200 MCG/HR: at 03:33

## 2020-01-01 RX ADMIN — Medication 100 MCG: at 17:36

## 2020-01-01 RX ADMIN — METHADONE HYDROCHLORIDE 20 MG: 10 TABLET ORAL at 10:40

## 2020-01-01 RX ADMIN — AMIODARONE HYDROCHLORIDE 200 MG: 200 TABLET ORAL at 08:48

## 2020-01-01 RX ADMIN — PREDNISONE 30 MG: 5 TABLET ORAL at 08:59

## 2020-01-01 RX ADMIN — HYDROCORTISONE SODIUM SUCCINATE 50 MG: 100 INJECTION, POWDER, FOR SOLUTION INTRAMUSCULAR; INTRAVENOUS at 05:33

## 2020-01-01 RX ADMIN — Medication 10 ML: at 06:48

## 2020-01-01 RX ADMIN — SODIUM BICARBONATE 100 MEQ: 84 INJECTION, SOLUTION INTRAVENOUS at 10:37

## 2020-01-01 RX ADMIN — Medication 10 ML: at 22:33

## 2020-01-01 RX ADMIN — ASPIRIN 81 MG CHEWABLE TABLET 81 MG: 81 TABLET CHEWABLE at 08:09

## 2020-01-01 RX ADMIN — POTASSIUM CHLORIDE 40 MEQ: 1500 TABLET, EXTENDED RELEASE ORAL at 18:34

## 2020-01-01 RX ADMIN — PANTOPRAZOLE SODIUM 40 MG: 40 TABLET, DELAYED RELEASE ORAL at 15:38

## 2020-01-01 RX ADMIN — OXYBUTYNIN CHLORIDE 5 MG: 5 TABLET ORAL at 18:30

## 2020-01-01 RX ADMIN — LEVOTHYROXINE SODIUM 75 MCG: 25 TABLET ORAL at 05:44

## 2020-01-01 RX ADMIN — PANTOPRAZOLE SODIUM 40 MG: 40 INJECTION, POWDER, FOR SOLUTION INTRAVENOUS at 08:44

## 2020-01-01 RX ADMIN — PROPOFOL 30 MCG/KG/MIN: 10 INJECTION, EMULSION INTRAVENOUS at 17:48

## 2020-01-01 RX ADMIN — SODIUM CHLORIDE, SODIUM ACETATE ANHYDROUS, SODIUM GLUCONATE, POTASSIUM CHLORIDE, AND MAGNESIUM CHLORIDE: 526; 222; 502; 37; 30 INJECTION, SOLUTION INTRAVENOUS at 18:10

## 2020-01-01 RX ADMIN — METHADONE HYDROCHLORIDE 10 MG: 10 CONCENTRATE ORAL at 09:31

## 2020-01-01 RX ADMIN — BUDESONIDE 500 MCG: 0.5 INHALANT RESPIRATORY (INHALATION) at 07:29

## 2020-01-01 RX ADMIN — NOREPINEPHRINE BITARTRATE 30 MCG/MIN: 1 INJECTION INTRAVENOUS at 09:41

## 2020-01-01 RX ADMIN — FLUCONAZOLE 400 MG: 400 INJECTION, SOLUTION INTRAVENOUS at 16:18

## 2020-01-01 RX ADMIN — CALCIUM CITRATE 200 MG (950 MG) TABLET 200 MG: at 17:44

## 2020-01-01 RX ADMIN — AMIODARONE HYDROCHLORIDE 200 MG: 200 TABLET ORAL at 08:59

## 2020-01-01 RX ADMIN — PIPERACILLIN SODIUM AND TAZOBACTAM SODIUM 4.5 G: 4; .5 INJECTION, POWDER, LYOPHILIZED, FOR SOLUTION INTRAVENOUS at 11:59

## 2020-01-01 RX ADMIN — CALCIUM GLUCONATE 2 G: 98 INJECTION, SOLUTION INTRAVENOUS at 08:00

## 2020-01-01 RX ADMIN — Medication 250 MG: at 08:58

## 2020-01-01 RX ADMIN — AZTREONAM 2 G: 2 INJECTION, POWDER, LYOPHILIZED, FOR SOLUTION INTRAMUSCULAR; INTRAVENOUS at 20:34

## 2020-01-01 RX ADMIN — INSULIN GLARGINE 25 UNITS: 100 INJECTION, SOLUTION SUBCUTANEOUS at 08:46

## 2020-01-01 RX ADMIN — VANCOMYCIN HYDROCHLORIDE 1250 MG: 10 INJECTION, POWDER, LYOPHILIZED, FOR SOLUTION INTRAVENOUS at 14:15

## 2020-01-01 RX ADMIN — INSULIN LISPRO 2 UNITS: 100 INJECTION, SOLUTION INTRAVENOUS; SUBCUTANEOUS at 11:39

## 2020-01-01 RX ADMIN — FLUTICASONE FUROATE AND VILANTEROL TRIFENATATE 1 PUFF: 100; 25 POWDER RESPIRATORY (INHALATION) at 09:06

## 2020-01-01 RX ADMIN — BUDESONIDE 500 MCG: 0.5 INHALANT RESPIRATORY (INHALATION) at 20:13

## 2020-01-01 RX ADMIN — PANTOPRAZOLE SODIUM 40 MG: 40 TABLET, DELAYED RELEASE ORAL at 09:21

## 2020-01-01 RX ADMIN — AMLODIPINE BESYLATE 2.5 MG: 2.5 TABLET ORAL at 09:02

## 2020-01-01 RX ADMIN — IPRATROPIUM BROMIDE AND ALBUTEROL SULFATE 3 ML: .5; 3 SOLUTION RESPIRATORY (INHALATION) at 20:46

## 2020-01-01 RX ADMIN — PREDNISONE 30 MG: 5 TABLET ORAL at 08:52

## 2020-01-01 RX ADMIN — Medication 250 MG: at 08:47

## 2020-01-01 RX ADMIN — IPRATROPIUM BROMIDE AND ALBUTEROL SULFATE 3 ML: .5; 3 SOLUTION RESPIRATORY (INHALATION) at 08:25

## 2020-01-01 RX ADMIN — DOCUSATE SODIUM 100 MG: 100 CAPSULE, LIQUID FILLED ORAL at 08:47

## 2020-01-01 RX ADMIN — OXYCODONE HYDROCHLORIDE 5 MG: 5 TABLET ORAL at 12:43

## 2020-01-01 RX ADMIN — BARIUM SULFATE 75 ML: 400 SUSPENSION ORAL at 12:50

## 2020-01-01 RX ADMIN — PIPERACILLIN SODIUM AND TAZOBACTAM SODIUM 4.5 G: 4; .5 INJECTION, POWDER, LYOPHILIZED, FOR SOLUTION INTRAVENOUS at 17:25

## 2020-01-01 RX ADMIN — SODIUM CHLORIDE 1000 MG: 900 INJECTION, SOLUTION INTRAVENOUS at 08:37

## 2020-01-01 RX ADMIN — IPRATROPIUM BROMIDE AND ALBUTEROL SULFATE 3 ML: .5; 3 SOLUTION RESPIRATORY (INHALATION) at 17:20

## 2020-01-01 RX ADMIN — FUROSEMIDE 20 MG: 20 TABLET ORAL at 08:28

## 2020-01-01 RX ADMIN — BUSPIRONE HYDROCHLORIDE 15 MG: 10 TABLET ORAL at 08:28

## 2020-01-01 RX ADMIN — INSULIN LISPRO 4 UNITS: 100 INJECTION, SOLUTION INTRAVENOUS; SUBCUTANEOUS at 12:33

## 2020-01-01 RX ADMIN — INSULIN LISPRO 12 UNITS: 100 INJECTION, SOLUTION INTRAVENOUS; SUBCUTANEOUS at 12:03

## 2020-01-01 RX ADMIN — Medication 10 ML: at 21:14

## 2020-01-01 RX ADMIN — METHADONE HYDROCHLORIDE 20 MG: 10 TABLET ORAL at 08:27

## 2020-01-01 RX ADMIN — ASPIRIN 81 MG CHEWABLE TABLET 81 MG: 81 TABLET CHEWABLE at 12:14

## 2020-01-01 RX ADMIN — POTASSIUM CHLORIDE 20 MEQ: 29.8 INJECTION, SOLUTION INTRAVENOUS at 03:00

## 2020-01-01 RX ADMIN — INSULIN LISPRO 3 UNITS: 100 INJECTION, SOLUTION INTRAVENOUS; SUBCUTANEOUS at 17:27

## 2020-01-01 RX ADMIN — ALBUMIN (HUMAN) 250 ML: 12.5 INJECTION, SOLUTION INTRAVENOUS at 14:13

## 2020-01-01 RX ADMIN — SODIUM CHLORIDE 250 ML: 900 INJECTION, SOLUTION INTRAVENOUS at 09:10

## 2020-01-01 RX ADMIN — FAMOTIDINE 20 MG: 20 TABLET ORAL at 08:32

## 2020-01-01 RX ADMIN — BUSPIRONE HYDROCHLORIDE 5 MG: 5 TABLET ORAL at 18:30

## 2020-01-01 RX ADMIN — PANTOPRAZOLE SODIUM 40 MG: 40 INJECTION, POWDER, FOR SOLUTION INTRAVENOUS at 20:59

## 2020-01-01 RX ADMIN — BUDESONIDE 500 MCG: 0.5 INHALANT RESPIRATORY (INHALATION) at 08:56

## 2020-01-01 RX ADMIN — CALCIUM CITRATE 200 MG (950 MG) TABLET 200 MG: at 17:25

## 2020-01-01 RX ADMIN — Medication 150 MCG/HR: at 05:00

## 2020-01-01 RX ADMIN — CHOLESTYRAMINE 4 G: 4 POWDER, FOR SUSPENSION ORAL at 18:30

## 2020-01-01 RX ADMIN — ALBUMIN (HUMAN) 25 G: 12.5 INJECTION, SOLUTION INTRAVENOUS at 19:22

## 2020-01-01 RX ADMIN — ACETAZOLAMIDE SODIUM 250 MG: 500 INJECTION, POWDER, LYOPHILIZED, FOR SOLUTION INTRAVENOUS at 12:30

## 2020-01-01 RX ADMIN — PANTOPRAZOLE SODIUM 40 MG: 40 TABLET, DELAYED RELEASE ORAL at 18:51

## 2020-01-01 RX ADMIN — Medication 5 TABLET: at 09:43

## 2020-01-01 RX ADMIN — INSULIN LISPRO 3 UNITS: 100 INJECTION, SOLUTION INTRAVENOUS; SUBCUTANEOUS at 17:18

## 2020-01-01 RX ADMIN — SODIUM CHLORIDE SOLN NEBU 3% 4 ML: 3 NEBU SOLN at 19:51

## 2020-01-01 RX ADMIN — PROPOFOL 30 MCG/KG/MIN: 10 INJECTION, EMULSION INTRAVENOUS at 07:36

## 2020-01-01 RX ADMIN — Medication 1 CAPSULE: at 09:31

## 2020-01-01 RX ADMIN — ACETAMINOPHEN ORAL SOLUTION 650 MG: 650 SOLUTION ORAL at 13:52

## 2020-01-01 RX ADMIN — HYDROCORTISONE SODIUM SUCCINATE 50 MG: 100 INJECTION, POWDER, FOR SOLUTION INTRAMUSCULAR; INTRAVENOUS at 06:15

## 2020-01-01 RX ADMIN — OXYBUTYNIN CHLORIDE 5 MG: 5 TABLET ORAL at 18:09

## 2020-01-01 RX ADMIN — METOCLOPRAMIDE HYDROCHLORIDE 10 MG: 5 TABLET ORAL at 08:19

## 2020-01-01 RX ADMIN — OXYCODONE AND ACETAMINOPHEN 1 TABLET: 5; 325 TABLET ORAL at 06:08

## 2020-01-01 RX ADMIN — INSULIN GLARGINE 40 UNITS: 100 INJECTION, SOLUTION SUBCUTANEOUS at 09:34

## 2020-01-01 RX ADMIN — PHENYLEPHRINE HYDROCHLORIDE 300 MCG/MIN: 10 INJECTION INTRAVENOUS at 22:37

## 2020-01-01 RX ADMIN — HYDROCORTISONE SODIUM SUCCINATE 50 MG: 100 INJECTION, POWDER, FOR SOLUTION INTRAMUSCULAR; INTRAVENOUS at 00:30

## 2020-01-01 RX ADMIN — ASPIRIN 81 MG 81 MG: 81 TABLET ORAL at 10:05

## 2020-01-01 RX ADMIN — CALCIUM GLUCONATE 2 G: 98 INJECTION, SOLUTION INTRAVENOUS at 18:08

## 2020-01-01 RX ADMIN — IPRATROPIUM BROMIDE AND ALBUTEROL SULFATE 3 ML: .5; 3 SOLUTION RESPIRATORY (INHALATION) at 19:17

## 2020-01-01 RX ADMIN — Medication 200 MCG/HR: at 22:02

## 2020-01-01 RX ADMIN — Medication 5 ML: at 21:00

## 2020-01-01 RX ADMIN — CHLORHEXIDINE GLUCONATE 0.12% ORAL RINSE 10 ML: 1.2 LIQUID ORAL at 09:09

## 2020-01-01 RX ADMIN — SODIUM CHLORIDE, SODIUM LACTATE, POTASSIUM CHLORIDE, AND CALCIUM CHLORIDE 1000 ML: 600; 310; 30; 20 INJECTION, SOLUTION INTRAVENOUS at 00:10

## 2020-01-01 RX ADMIN — OXYCODONE HYDROCHLORIDE AND ACETAMINOPHEN 1 TABLET: 10; 325 TABLET ORAL at 05:35

## 2020-01-01 RX ADMIN — INSULIN LISPRO 6 UNITS: 100 INJECTION, SOLUTION INTRAVENOUS; SUBCUTANEOUS at 08:17

## 2020-01-01 RX ADMIN — INSULIN LISPRO 6 UNITS: 100 INJECTION, SOLUTION INTRAVENOUS; SUBCUTANEOUS at 17:15

## 2020-01-01 RX ADMIN — LEVOTHYROXINE SODIUM 100 MCG: 0.1 TABLET ORAL at 05:20

## 2020-01-01 RX ADMIN — PANTOPRAZOLE SODIUM 40 MG: 40 TABLET, DELAYED RELEASE ORAL at 09:02

## 2020-01-01 RX ADMIN — POTASSIUM CHLORIDE 20 MEQ: 14.9 INJECTION, SOLUTION INTRAVENOUS at 20:15

## 2020-01-01 RX ADMIN — THERA TABS 1 TABLET: TAB at 09:44

## 2020-01-01 RX ADMIN — BUSPIRONE HYDROCHLORIDE 5 MG: 5 TABLET ORAL at 15:25

## 2020-01-01 RX ADMIN — PANTOPRAZOLE SODIUM 40 MG: 40 INJECTION, POWDER, FOR SOLUTION INTRAVENOUS at 22:02

## 2020-01-01 RX ADMIN — ENOXAPARIN SODIUM 70 MG: 80 INJECTION SUBCUTANEOUS at 21:59

## 2020-01-01 RX ADMIN — HYDROCORTISONE SODIUM SUCCINATE 50 MG: 100 INJECTION, POWDER, FOR SOLUTION INTRAMUSCULAR; INTRAVENOUS at 23:28

## 2020-01-01 RX ADMIN — AMLODIPINE BESYLATE 2.5 MG: 2.5 TABLET ORAL at 12:03

## 2020-01-01 RX ADMIN — SODIUM CHLORIDE 10 ML: 9 INJECTION, SOLUTION INTRAMUSCULAR; INTRAVENOUS; SUBCUTANEOUS at 10:41

## 2020-01-01 RX ADMIN — HEPARIN SODIUM 1090 UNITS/HR: 10000 INJECTION, SOLUTION INTRAVENOUS at 05:50

## 2020-01-01 RX ADMIN — BUDESONIDE 500 MCG: 0.5 INHALANT RESPIRATORY (INHALATION) at 10:22

## 2020-01-01 RX ADMIN — PIPERACILLIN SODIUM AND TAZOBACTAM SODIUM 4.5 G: 4; .5 INJECTION, POWDER, LYOPHILIZED, FOR SOLUTION INTRAVENOUS at 00:00

## 2020-01-01 RX ADMIN — METHADONE HYDROCHLORIDE 20 MG: 10 TABLET ORAL at 08:51

## 2020-01-01 RX ADMIN — METHOCARBAMOL 500 MG: 500 TABLET, FILM COATED ORAL at 14:54

## 2020-01-01 RX ADMIN — METHADONE HYDROCHLORIDE 20 MG: 10 TABLET ORAL at 08:32

## 2020-01-01 RX ADMIN — BUDESONIDE 500 MCG: 0.5 INHALANT RESPIRATORY (INHALATION) at 19:47

## 2020-01-01 RX ADMIN — DOCUSATE SODIUM 100 MG: 100 CAPSULE, LIQUID FILLED ORAL at 08:30

## 2020-01-01 RX ADMIN — MAGNESIUM SULFATE HEPTAHYDRATE 2 G: 500 INJECTION, SOLUTION INTRAMUSCULAR; INTRAVENOUS at 11:13

## 2020-01-01 RX ADMIN — BUSPIRONE HYDROCHLORIDE 15 MG: 10 TABLET ORAL at 09:02

## 2020-01-01 RX ADMIN — POTASSIUM CHLORIDE: 2 INJECTION, SOLUTION, CONCENTRATE INTRAVENOUS at 20:00

## 2020-01-01 RX ADMIN — PREDNISONE 30 MG: 5 TABLET ORAL at 09:43

## 2020-01-01 RX ADMIN — CALCIUM CITRATE 200 MG (950 MG) TABLET 200 MG: at 22:11

## 2020-01-01 RX ADMIN — INSULIN LISPRO 6 UNITS: 100 INJECTION, SOLUTION INTRAVENOUS; SUBCUTANEOUS at 17:24

## 2020-01-01 RX ADMIN — FUROSEMIDE 20 MG: 20 TABLET ORAL at 09:28

## 2020-01-01 RX ADMIN — INSULIN GLARGINE 15 UNITS: 100 INJECTION, SOLUTION SUBCUTANEOUS at 23:37

## 2020-01-01 RX ADMIN — HEPARIN SODIUM 5000 UNITS: 5000 INJECTION INTRAVENOUS; SUBCUTANEOUS at 08:30

## 2020-01-01 RX ADMIN — ASPIRIN 81 MG 81 MG: 81 TABLET ORAL at 08:42

## 2020-01-01 RX ADMIN — Medication 5 TABLET: at 09:24

## 2020-01-01 RX ADMIN — HEPARIN SODIUM 5000 UNITS: 5000 INJECTION INTRAVENOUS; SUBCUTANEOUS at 22:27

## 2020-01-01 RX ADMIN — METHADONE HYDROCHLORIDE 20 MG: 10 TABLET ORAL at 09:43

## 2020-01-01 RX ADMIN — POTASSIUM CHLORIDE: 2 INJECTION, SOLUTION, CONCENTRATE INTRAVENOUS at 14:13

## 2020-01-01 RX ADMIN — ALBUTEROL SULFATE 1 PUFF: 90 AEROSOL, METERED RESPIRATORY (INHALATION) at 20:00

## 2020-01-01 RX ADMIN — PANTOPRAZOLE SODIUM 40 MG: 40 INJECTION, POWDER, FOR SOLUTION INTRAVENOUS at 08:33

## 2020-01-01 RX ADMIN — INSULIN LISPRO 4 UNITS: 100 INJECTION, SOLUTION INTRAVENOUS; SUBCUTANEOUS at 16:30

## 2020-01-01 RX ADMIN — IPRATROPIUM BROMIDE AND ALBUTEROL SULFATE 3 ML: .5; 3 SOLUTION RESPIRATORY (INHALATION) at 03:59

## 2020-01-01 RX ADMIN — IPRATROPIUM BROMIDE AND ALBUTEROL SULFATE 3 ML: .5; 3 SOLUTION RESPIRATORY (INHALATION) at 02:03

## 2020-01-01 RX ADMIN — INSULIN LISPRO 2 UNITS: 100 INJECTION, SOLUTION INTRAVENOUS; SUBCUTANEOUS at 06:05

## 2020-01-01 RX ADMIN — PREDNISONE 30 MG: 5 TABLET ORAL at 08:27

## 2020-01-01 RX ADMIN — IPRATROPIUM BROMIDE AND ALBUTEROL SULFATE 3 ML: .5; 3 SOLUTION RESPIRATORY (INHALATION) at 11:13

## 2020-01-01 RX ADMIN — Medication 250 MG: at 09:31

## 2020-01-01 RX ADMIN — HYDROCORTISONE SODIUM SUCCINATE 50 MG: 100 INJECTION, POWDER, FOR SOLUTION INTRAMUSCULAR; INTRAVENOUS at 00:26

## 2020-01-01 RX ADMIN — PREDNISONE 20 MG: 20 TABLET ORAL at 12:52

## 2020-01-01 RX ADMIN — FENTANYL CITRATE 50 MCG: 50 INJECTION INTRAMUSCULAR; INTRAVENOUS at 09:18

## 2020-01-01 RX ADMIN — CHOLESTYRAMINE 4 G: 4 POWDER, FOR SUSPENSION ORAL at 12:26

## 2020-01-01 RX ADMIN — SODIUM CHLORIDE 1000 ML: 900 INJECTION, SOLUTION INTRAVENOUS at 20:19

## 2020-01-01 RX ADMIN — FUROSEMIDE 20 MG: 20 TABLET ORAL at 17:00

## 2020-01-01 RX ADMIN — LEVOTHYROXINE SODIUM 75 MCG: 25 TABLET ORAL at 06:03

## 2020-01-01 RX ADMIN — APIXABAN 5 MG: 5 TABLET, FILM COATED ORAL at 18:27

## 2020-01-01 RX ADMIN — INSULIN LISPRO 6 UNITS: 100 INJECTION, SOLUTION INTRAVENOUS; SUBCUTANEOUS at 12:33

## 2020-01-01 RX ADMIN — FENTANYL CITRATE 100 MCG: 50 INJECTION INTRAMUSCULAR; INTRAVENOUS at 11:01

## 2020-01-01 RX ADMIN — DOCUSATE SODIUM 100 MG: 100 CAPSULE, LIQUID FILLED ORAL at 08:33

## 2020-01-01 RX ADMIN — ALBUTEROL SULFATE 1 PUFF: 90 AEROSOL, METERED RESPIRATORY (INHALATION) at 07:33

## 2020-01-01 RX ADMIN — CALCIUM CITRATE 200 MG (950 MG) TABLET 200 MG: at 17:19

## 2020-01-01 RX ADMIN — IPRATROPIUM BROMIDE AND ALBUTEROL SULFATE 3 ML: .5; 3 SOLUTION RESPIRATORY (INHALATION) at 07:18

## 2020-01-01 RX ADMIN — ALLOPURINOL 100 MG: 100 TABLET ORAL at 10:08

## 2020-01-01 RX ADMIN — IPRATROPIUM BROMIDE AND ALBUTEROL SULFATE 3 ML: .5; 3 SOLUTION RESPIRATORY (INHALATION) at 21:11

## 2020-01-01 RX ADMIN — CALCIUM CITRATE 200 MG (950 MG) TABLET 200 MG: at 10:06

## 2020-01-01 RX ADMIN — IPRATROPIUM BROMIDE AND ALBUTEROL SULFATE 3 ML: .5; 3 SOLUTION RESPIRATORY (INHALATION) at 07:29

## 2020-01-01 RX ADMIN — INSULIN GLARGINE 40 UNITS: 100 INJECTION, SOLUTION SUBCUTANEOUS at 08:47

## 2020-01-01 RX ADMIN — HYDROCORTISONE SODIUM SUCCINATE 50 MG: 100 INJECTION, POWDER, FOR SOLUTION INTRAMUSCULAR; INTRAVENOUS at 21:08

## 2020-01-01 RX ADMIN — INSULIN GLARGINE 25 UNITS: 100 INJECTION, SOLUTION SUBCUTANEOUS at 08:30

## 2020-01-01 RX ADMIN — BUSPIRONE HYDROCHLORIDE 5 MG: 5 TABLET ORAL at 17:30

## 2020-01-01 RX ADMIN — DIPHENHYDRAMINE HYDROCHLORIDE 25 MG: 50 INJECTION INTRAMUSCULAR; INTRAVENOUS at 12:42

## 2020-01-01 RX ADMIN — PANTOPRAZOLE SODIUM 40 MG: 40 TABLET, DELAYED RELEASE ORAL at 09:26

## 2020-01-01 RX ADMIN — FERROUS SULFATE TAB 325 MG (65 MG ELEMENTAL FE) 325 MG: 325 (65 FE) TAB at 08:33

## 2020-01-01 RX ADMIN — INSULIN LISPRO 4 UNITS: 100 INJECTION, SOLUTION INTRAVENOUS; SUBCUTANEOUS at 13:50

## 2020-01-01 RX ADMIN — OXYCODONE HYDROCHLORIDE AND ACETAMINOPHEN 1 TABLET: 10; 325 TABLET ORAL at 13:08

## 2020-01-01 RX ADMIN — PREDNISONE 30 MG: 5 TABLET ORAL at 08:24

## 2020-01-01 RX ADMIN — GUAIFENESIN 600 MG: 600 TABLET, EXTENDED RELEASE ORAL at 21:49

## 2020-01-01 RX ADMIN — POTASSIUM & SODIUM PHOSPHATES POWDER PACK 280-160-250 MG 1 PACKET: 280-160-250 PACK at 08:45

## 2020-01-01 RX ADMIN — ROSUVASTATIN CALCIUM 5 MG: 10 TABLET, FILM COATED ORAL at 21:29

## 2020-01-01 RX ADMIN — AMIODARONE HYDROCHLORIDE 200 MG: 200 TABLET ORAL at 17:17

## 2020-01-01 RX ADMIN — INSULIN LISPRO 6 UNITS: 100 INJECTION, SOLUTION INTRAVENOUS; SUBCUTANEOUS at 21:40

## 2020-01-01 RX ADMIN — ROSUVASTATIN CALCIUM 5 MG: 5 TABLET, COATED ORAL at 21:57

## 2020-01-01 RX ADMIN — CHLORHEXIDINE GLUCONATE 0.12% ORAL RINSE 10 ML: 1.2 LIQUID ORAL at 22:28

## 2020-01-01 RX ADMIN — ALBUMIN HUMAN 12.5 G: 250 SOLUTION INTRAVENOUS at 09:59

## 2020-01-01 RX ADMIN — LEVOTHYROXINE SODIUM 100 MCG: 100 TABLET ORAL at 09:33

## 2020-01-01 RX ADMIN — PROPOFOL 30 MCG/KG/MIN: 10 INJECTION, EMULSION INTRAVENOUS at 18:45

## 2020-01-01 RX ADMIN — LEVOTHYROXINE SODIUM ANHYDROUS 75 MCG: 100 INJECTION, POWDER, LYOPHILIZED, FOR SOLUTION INTRAVENOUS at 09:00

## 2020-01-01 RX ADMIN — METOCLOPRAMIDE HYDROCHLORIDE 10 MG: 5 TABLET ORAL at 17:04

## 2020-01-01 RX ADMIN — AZITHROMYCIN MONOHYDRATE 500 MG: 500 INJECTION, POWDER, LYOPHILIZED, FOR SOLUTION INTRAVENOUS at 17:14

## 2020-01-01 RX ADMIN — CHLORHEXIDINE GLUCONATE 0.12% ORAL RINSE 10 ML: 1.2 LIQUID ORAL at 22:34

## 2020-01-01 RX ADMIN — IPRATROPIUM BROMIDE AND ALBUTEROL SULFATE 3 ML: .5; 3 SOLUTION RESPIRATORY (INHALATION) at 13:01

## 2020-01-01 RX ADMIN — PREDNISONE 30 MG: 20 TABLET ORAL at 09:30

## 2020-01-01 RX ADMIN — SODIUM CHLORIDE, SODIUM LACTATE, POTASSIUM CHLORIDE, AND CALCIUM CHLORIDE 217 ML: 600; 310; 30; 20 INJECTION, SOLUTION INTRAVENOUS at 00:30

## 2020-01-01 RX ADMIN — PIPERACILLIN AND TAZOBACTAM 3.38 G: 3; .375 INJECTION, POWDER, LYOPHILIZED, FOR SOLUTION INTRAVENOUS at 11:06

## 2020-01-01 RX ADMIN — FENTANYL CITRATE 100 MCG: 50 INJECTION INTRAMUSCULAR; INTRAVENOUS at 09:05

## 2020-01-01 RX ADMIN — IPRATROPIUM BROMIDE AND ALBUTEROL SULFATE 3 ML: .5; 3 SOLUTION RESPIRATORY (INHALATION) at 11:11

## 2020-01-01 RX ADMIN — SENNOSIDES AND DOCUSATE SODIUM 2 TABLET: 8.6; 5 TABLET ORAL at 17:32

## 2020-01-01 RX ADMIN — FLUTICASONE FUROATE AND VILANTEROL TRIFENATATE 1 PUFF: 100; 25 POWDER RESPIRATORY (INHALATION) at 09:43

## 2020-01-01 RX ADMIN — POTASSIUM CHLORIDE 10 MEQ: 200 INJECTION, SOLUTION INTRAVENOUS at 11:00

## 2020-01-01 RX ADMIN — ROSUVASTATIN CALCIUM 5 MG: 10 TABLET, COATED ORAL at 22:09

## 2020-01-01 RX ADMIN — DIPHENHYDRAMINE HYDROCHLORIDE 25 MG: 50 INJECTION, SOLUTION INTRAMUSCULAR; INTRAVENOUS at 12:18

## 2020-01-01 RX ADMIN — SPIRONOLACTONE 25 MG: 25 TABLET ORAL at 09:27

## 2020-01-01 RX ADMIN — AMIODARONE HYDROCHLORIDE 200 MG: 200 TABLET ORAL at 08:47

## 2020-01-01 RX ADMIN — HEPARIN SODIUM 5000 UNITS: 5000 INJECTION INTRAVENOUS; SUBCUTANEOUS at 22:28

## 2020-01-01 RX ADMIN — FLUCONAZOLE 400 MG: 400 INJECTION, SOLUTION INTRAVENOUS at 17:03

## 2020-01-01 RX ADMIN — HEPARIN SODIUM 5000 UNITS: 5000 INJECTION INTRAVENOUS; SUBCUTANEOUS at 07:00

## 2020-01-01 RX ADMIN — ROPIVACAINE HYDROCHLORIDE 40 MG: 2 INJECTION, SOLUTION EPIDURAL; INFILTRATION at 18:14

## 2020-01-01 RX ADMIN — METHOCARBAMOL 500 MG: 500 TABLET, FILM COATED ORAL at 21:03

## 2020-01-01 RX ADMIN — PREDNISONE 30 MG: 5 TABLET ORAL at 08:45

## 2020-01-01 RX ADMIN — HYDROCORTISONE SODIUM SUCCINATE 50 MG: 100 INJECTION, POWDER, FOR SOLUTION INTRAMUSCULAR; INTRAVENOUS at 00:00

## 2020-01-01 RX ADMIN — ACETAMINOPHEN 1000 MG: 10 INJECTION, SOLUTION INTRAVENOUS at 10:25

## 2020-01-01 RX ADMIN — PREDNISONE 30 MG: 5 TABLET ORAL at 08:47

## 2020-01-01 RX ADMIN — HEPARIN SODIUM 10000 UNITS: 1000 INJECTION, SOLUTION INTRAVENOUS; SUBCUTANEOUS at 16:13

## 2020-01-01 RX ADMIN — CEFEPIME 2 G: 2 INJECTION, POWDER, FOR SOLUTION INTRAVENOUS at 09:31

## 2020-01-01 RX ADMIN — METHOCARBAMOL 500 MG: 500 TABLET, FILM COATED ORAL at 13:39

## 2020-01-01 RX ADMIN — SERTRALINE HYDROCHLORIDE 50 MG: 50 TABLET ORAL at 08:19

## 2020-01-01 RX ADMIN — LUBIPROSTONE 24 MCG: 24 CAPSULE, GELATIN COATED ORAL at 08:59

## 2020-01-01 RX ADMIN — BUSPIRONE HYDROCHLORIDE 15 MG: 10 TABLET ORAL at 21:41

## 2020-01-01 RX ADMIN — PROPOFOL 30 MCG/KG/MIN: 10 INJECTION, EMULSION INTRAVENOUS at 02:49

## 2020-01-01 RX ADMIN — DILTIAZEM HYDROCHLORIDE 30 MG: 30 TABLET, FILM COATED ORAL at 16:29

## 2020-01-01 RX ADMIN — BUDESONIDE 500 MCG: 0.5 INHALANT RESPIRATORY (INHALATION) at 07:52

## 2020-01-01 RX ADMIN — FLUTICASONE FUROATE AND VILANTEROL TRIFENATATE 1 PUFF: 100; 25 POWDER RESPIRATORY (INHALATION) at 08:51

## 2020-01-01 RX ADMIN — ROSUVASTATIN CALCIUM 5 MG: 5 TABLET, COATED ORAL at 21:19

## 2020-01-01 RX ADMIN — CEFEPIME 2 G: 2 INJECTION, POWDER, FOR SOLUTION INTRAVENOUS at 15:52

## 2020-01-01 RX ADMIN — INSULIN LISPRO 2 UNITS: 100 INJECTION, SOLUTION INTRAVENOUS; SUBCUTANEOUS at 12:57

## 2020-01-01 RX ADMIN — FENTANYL CITRATE 100 MCG: 50 INJECTION INTRAMUSCULAR; INTRAVENOUS at 20:40

## 2020-01-01 RX ADMIN — Medication 200 MCG/HR: at 22:25

## 2020-01-01 RX ADMIN — CALCIUM CITRATE 200 MG (950 MG) TABLET 200 MG: at 17:23

## 2020-01-01 RX ADMIN — ASPIRIN 81 MG 81 MG: 81 TABLET ORAL at 10:40

## 2020-01-01 RX ADMIN — METOCLOPRAMIDE HYDROCHLORIDE 10 MG: 5 TABLET ORAL at 15:48

## 2020-01-01 RX ADMIN — POTASSIUM CHLORIDE 10 MEQ: 10 INJECTION, SOLUTION INTRAVENOUS at 02:14

## 2020-01-01 RX ADMIN — HYDROCORTISONE SODIUM SUCCINATE 50 MG: 100 INJECTION, POWDER, FOR SOLUTION INTRAMUSCULAR; INTRAVENOUS at 18:08

## 2020-01-01 RX ADMIN — HEPARIN SODIUM 5000 UNITS: 5000 INJECTION INTRAVENOUS; SUBCUTANEOUS at 22:57

## 2020-01-01 RX ADMIN — GLYCOPYRROLATE AND FORMOTEROL FUMARATE 2 PUFF: 9; 4.8 AEROSOL, METERED RESPIRATORY (INHALATION) at 20:19

## 2020-01-01 RX ADMIN — ASPIRIN 81 MG 81 MG: 81 TABLET ORAL at 08:15

## 2020-01-01 RX ADMIN — HYDROMORPHONE HYDROCHLORIDE 1 MG: 1 INJECTION, SOLUTION INTRAMUSCULAR; INTRAVENOUS; SUBCUTANEOUS at 10:19

## 2020-01-01 RX ADMIN — INSULIN LISPRO 2 UNITS: 100 INJECTION, SOLUTION INTRAVENOUS; SUBCUTANEOUS at 08:40

## 2020-01-01 RX ADMIN — MIDAZOLAM HYDROCHLORIDE 2 MG: 2 INJECTION, SOLUTION INTRAMUSCULAR; INTRAVENOUS at 14:25

## 2020-01-01 RX ADMIN — INSULIN LISPRO 6 UNITS: 100 INJECTION, SOLUTION INTRAVENOUS; SUBCUTANEOUS at 11:13

## 2020-01-01 RX ADMIN — PIPERACILLIN SODIUM AND TAZOBACTAM SODIUM 4.5 G: 4; .5 INJECTION, POWDER, LYOPHILIZED, FOR SOLUTION INTRAVENOUS at 00:01

## 2020-01-01 RX ADMIN — POTASSIUM CHLORIDE 10 MEQ: 10 INJECTION, SOLUTION INTRAVENOUS at 00:05

## 2020-01-01 RX ADMIN — ALLOPURINOL 100 MG: 100 TABLET ORAL at 08:27

## 2020-01-01 RX ADMIN — DOCUSATE SODIUM 100 MG: 100 CAPSULE, LIQUID FILLED ORAL at 08:14

## 2020-01-01 RX ADMIN — PANTOPRAZOLE SODIUM 40 MG: 40 TABLET, DELAYED RELEASE ORAL at 08:30

## 2020-01-01 RX ADMIN — IPRATROPIUM BROMIDE AND ALBUTEROL SULFATE 3 ML: .5; 3 SOLUTION RESPIRATORY (INHALATION) at 03:03

## 2020-01-01 RX ADMIN — POTASSIUM & SODIUM PHOSPHATES POWDER PACK 280-160-250 MG 1 PACKET: 280-160-250 PACK at 13:00

## 2020-01-01 RX ADMIN — AMIODARONE HYDROCHLORIDE 200 MG: 200 TABLET ORAL at 17:24

## 2020-01-01 RX ADMIN — INSULIN GLARGINE 25 UNITS: 100 INJECTION, SOLUTION SUBCUTANEOUS at 08:43

## 2020-01-01 RX ADMIN — GUAIFENESIN 600 MG: 600 TABLET, EXTENDED RELEASE ORAL at 10:07

## 2020-01-01 RX ADMIN — MIDAZOLAM HYDROCHLORIDE 2 MG: 2 INJECTION, SOLUTION INTRAMUSCULAR; INTRAVENOUS at 19:08

## 2020-01-01 RX ADMIN — PIPERACILLIN SODIUM AND TAZOBACTAM SODIUM 4.5 G: 4; .5 INJECTION, POWDER, LYOPHILIZED, FOR SOLUTION INTRAVENOUS at 13:15

## 2020-01-01 RX ADMIN — Medication 1 CAPSULE: at 08:28

## 2020-01-01 RX ADMIN — BUSPIRONE HYDROCHLORIDE 5 MG: 5 TABLET ORAL at 15:00

## 2020-01-01 RX ADMIN — CEFEPIME 1 G: 1 INJECTION, POWDER, FOR SOLUTION INTRAMUSCULAR; INTRAVENOUS at 22:11

## 2020-01-01 RX ADMIN — ROSUVASTATIN CALCIUM 5 MG: 10 TABLET, FILM COATED ORAL at 21:25

## 2020-01-01 RX ADMIN — SENNOSIDES AND DOCUSATE SODIUM 2 TABLET: 8.6; 5 TABLET ORAL at 17:26

## 2020-01-01 NOTE — DISCHARGE SUMMARY
Discharge Summary     Patient ID:  Leeroy Messer  [id number]  62 y.o.  1955    PCP on record: Indigo Albarran MD    Admit date: 2017  Discharge date and time: 2017    Discharge Diagnoses:                                           Left hand abscess s/p I&D   COPD Acute   DM2  Morbid obesity   Hypertension   IVDA use   Chronic hypoxic respiratory failure - on Home o2 @ 2L via NC       Consults: ID and [address] Course by problems:    Admitted with abscess and cellulitis - Left hand , Left dorsal foot -   Procedure performed   - INCISION AND DRAINAGE LEFT HAND, SHARP EXCISIONAL DEBRIDEMENT  - INCISION AND DRAINAGE LEFT FOOT DORSAL WITH SHARP EXCISIONAL DEBRIDEMENT  - now getting wound care :1 Left wrist wound: continue Aqua cell AG/Smellie dressings  2. Left foot wound: Aqua cell AG/Mepilex dressings    - ID consulted - patient is being discharged on PO Levaquin for 7 days     - FOR DM - she will go back to her original diabetic management and follow up with her PCP     - weight reduction and abstinence from  Drug use emphasized  - For COPD - patient was on low dose steroids - her respi status is controlled with BD , will DC prednisone     - patient is on home O2 ( 2 L ) - continue it          Patient seen and examined by me on discharge day. Pertinent Findings:      Procedure   Diane Mckeon MD Physician Signed Orthopedic Surgery Op Notes Date of Service: 17         []Hide copied text  []Sania for attribution information  [address]     Name: Doe Castillo  MR#: [id number]  : 1955  Account #: [de-identified]  Date of Adm: 2017  Date of Surgery: 2017        PREOPERATIVE DIAGNOSES:  1. Abscess, dorsal part of the left hand. 2. Abscess dorsal part of left foot.     POSTOPERATIVE DIAGNOSES:  1. Abscess, dorsal part of the left hand.   2. Abscess dorsal part of left foot.     PROCEDURES PERFORMED:  1. Incision and drainage of the abscess dorsal part of left hand. 2. Incision and drainage left foot dorsal aspect. 3. Sharp excisional debridement, dorsal aspect, left hand. 4. Sharp excisional debridement, dorsal aspect of the left foot.     SURGEON: Young Angela MD.     ASSISTANT: Flora Weinberg ().     FLUIDS: 500 mL.     ANESTHESIA: General.     No tourniquet.     ESTIMATED BLOOD LOSS: 20 mL.     SPECIMENS REMOVED: Yes.     1. Cultures, dorsal aspect, left hand. 2. Cultures dorsal aspect of left foot.     FINDINGS: Focal abscess dorsal hand and dorsal foot.     COMPLICATIONS: None.     IMPLANTS: Iodoform dressing.     CLINICAL HISTORY: The patient is a 64-year-old female who admits  to IV drug abuse and self-injected herself to the dorsal part of her left  hand, dorsal part of her left foot. She was admitted to the hospital last  week, was followed closely, and with time areas of cellulitis, became  focal abscess. Her initial MRI done 2-3 days ago, did not show an  abscess, but clinically, she gradually formed a focal swelling, as such,  she formed abscess dorsal part of her left hand. She has some  drainage from the dorsal part of her foot through an eschar.     She was consented for irrigation and debridement of her left hand,  irrigation and debridement of her left foot. The risks of surgery  included, but were not limited to bleeding, infection, DVT, PE, death,  RSD, tyrell-incisional numbness, subcutaneous hematoma, post-  surgical scar, possible delayed healing, nonhealing, possible wound  complications. She voiced understanding of the risks of surgery, and  informed decision was made to proceed.     DESCRIPTION OF PROCEDURE: As such she was taken to  operating room today, on 04/17/2017. General anesthesia was  conducted. She was carefully positioned supine on the operating table  prior to the general anesthesia.  The entire left hand and dorsal part of  left foot was cleansed with chlorhexidine gluconate scrub and  chlorhexidine gluconate yellow prep stick. A formal time-out was  conducted identifying correct limb, correct location for surgery,  confirmation that the patient did receive antibiotics, all members of  surgical team agreed to side timeout. As such, I elected to proceed.     I first made a 2 cm incision in the dorsal part of her left hand. Incision  was made through skin and subcutaneous tissues and carefully down  to this fullness. The patient had pus, that came out quite easily. I obtained cultures of this. I thoroughly irrigated this with sharp  excisional debridement of subcutaneous tissue, and abscess was  performed using a #15 blade, and small Littler scissors. After thorough  irrigation, I packed the wound with iodoform. I did use an  electrocauterization for selective hemostasis.     I then went down, left foot. The patient had an eschar on the dorsal  part of her left foot. This eschar was removed, in removing the eschar,  then pus was retrieved. I obtained cultures of this. Made an  incision proximal and distal to this eschar. After I removed the eschar  sharp excisional debridement of subcutaneous tissues, abscess, and  granulation tissue was performed. I thoroughly irrigated this wound of  the dorsal part of her left foot. I then packed this with iodoform. Sterile  dressings were each placed to the left hand was 4 x 4's, sterile Kerlix  and an Ace wrap. The sterile dressing was placed to the left foot  consisting of 4 x 4's, 4-inch Kerlix and Ace wrap. The patient was  extubated and transferred to the recovery room in stable condition. There were no complications.           Rodolfo Ortiz MD [facility] / [facility]  D: 04/17/2017 20:39  T: 04/18/2017 06:18  Job #: [id number]              Pertinent Lab Data:  No results for input(s): WBC, HGB, HCT, PLT, HGBEXT, HCTEXT, PLTEXT, HGBEXT, HCTEXT, PLTEXT in the last 72 hours.   Recent Labs 04/19/17   0323  04/18/17   0300   NA  138  140   K  4.5  4.0   CL  101  102   CO2  33*  33*   GLU  188*  164*   BUN  12  10   CREA  0.95  1.34*   CA  9.1  8.6   MG   --   1.6       DISCHARGE MEDICATIONS:   @  Current Discharge Medication List      START taking these medications    Details   levoFLOXacin (LEVAQUIN) 750 mg tablet Take 1 Tab by mouth daily for 7 days. Qty: 7 Tab, Refills: 0    Comments: Patient has taken Levaquin before so no serious side effects noted      oxyCODONE-acetaminophen (PERCOCET) 5-325 mg per tablet Take 1 Tab by mouth every eight (8) hours as needed for Pain. Max Daily Amount: 3 Tabs. Qty: 15 Tab, Refills: 0         CONTINUE these medications which have NOT CHANGED    Details   umeclidinium-vilanterol (ANORO ELLIPTA) 62.5-25 mcg/actuation inhaler Take 1 Puff by inhalation daily. Qty: 1 Inhaler, Refills: 5      VENTOLIN HFA 90 mcg/actuation inhaler INHALE 2 PUFFS EVERY 4 HOURS AS NEEDED  Qty: 1 Inhaler, Refills: 4      divalproex DR (DEPAKOTE) 250 mg tablet Take 250 mg by mouth nightly. furosemide (LASIX) 20 mg tablet Take 1 Tab by mouth daily. Qty: 30 Tab, Refills: 0      metOLazone (ZAROXOLYN) 5 mg tablet Take 0.5 Tabs by mouth Every Mon, Wed & Sun.  Qty: 30 Tab, Refills: 6      potassium chloride (KLOR-CON) 20 mEq packet Take 1 Packet by mouth daily. Qty: 30 Each, Refills: 0      clopidogrel (PLAVIX) 75 mg tablet Take 1 Tab by mouth daily. Qty: 30 Tab, Refills: 0      insulin glargine (LANTUS) 100 unit/mL injection 60 units subcutaneously every night  Qty: 1 Vial, Refills: 0      insulin syringe,safetyneedle 1 mL 30 gauge x 5/16\" syrg As directed  Qty: 50 Each, Refills: 0      ARIPiprazole (ABILIFY) 5 mg tablet Take 10 mg by mouth daily.     Comments: take 2 tabs each morning      albuterol (PROVENTIL VENTOLIN) 2.5 mg /3 mL (0.083 %) nebulizer solution USE 1 NEB EVERY 4 HOURS FOR WHEEZING AND SHORTNESS OF BREATH  Indications: CHRONIC OBSTRUCTIVE PULMONARY DISEASE  Qty: 2 Package, Refills: 3    Associated Diagnoses: Chronic obstructive pulmonary disease, unspecified COPD type ([facility]      eplerenone (INSPRA) 25 mg tablet Take  by mouth daily. gabapentin (NEURONTIN) 300 mg capsule Take 300 mg by mouth three (3) times daily. acetaminophen (TYLENOL) 325 mg tablet Take 325 mg by mouth every six (6) hours as needed for Pain. traZODone (DESYREL) 100 mg tablet Take 100 mg by mouth nightly. rosuvastatin (CRESTOR) 5 mg tablet Take 1 Tab by mouth nightly. Qty: 30 Tab, Refills: 6      l-methylfolate-vit b12-vit b6 (METANX) 2.8-2-25 mg Tab Take  by mouth. aspirin 81 mg tablet Take 81 mg by mouth daily. Nebulizer Accessories Kit Use with nebulizer machine  Qty: 1 Kit, Refills: prn    Associated Diagnoses: COPD (chronic obstructive pulmonary disease) (HCC)      insulin CONCENTRATED regular (U-500 CONCENTRATED) 500 unit/mL Soln 20 Units by SubCUTAneous route. With breakfast, lunch, and dinner      levothyroxine (SYNTHROID) 100 mcg tablet Take  by mouth Daily (before breakfast). sertraline (ZOLOFT) 100 mg tablet Take 50 mg by mouth daily. Oxygen-Air Delivery Systems by Nasal route continuous. 2 LNC      ergocalciferol (VITAMIN D) 50,000 unit capsule Take 50,000 Units by mouth daily. STOP taking these medications       predniSONE (DELTASONE) 5 mg tablet Comments:   Reason for Stopping:                 My Recommended Diet, Activity, Wound Care, and follow-up labs are listed in the patient's Discharge Insturctions which I have personally completed and reviewed. Disposition:     [] Home with family     [] [facility] PT/RN   [] SNF/NH   [] Inpatient Rehab/[facility]  Condition at Discharge:  Stable    Follow up with:   PCP : Jose Maria Cox MD      Please follow-up tests/labs that are still pendin.  None  2.    >30 minutes spent coordinating this discharge (review instructions/follow-up, prescriptions, preparing report for sign off)    Signed:  Sulma Andrews Gabino Delgado MD  4/20/2017  1:17 PM house

## 2020-01-01 NOTE — PROGRESS NOTES
SHIFT CHANGE NOTE FOR [facility]    Bedside and Verbal shift change report given to Araceli James LPN (oncoming nurse) by Denny Toro RN (offgoing nurse). Report included the following information SBAR, Kardex, MAR and Recent Results.     Situation:   Code Status: Full Code   Reason for Admission: Spinal Thoracic laminectomy T 6-T [address] Day: 16   Problem List:   Hospital Problems  Date Reviewed: 12/29/2019          Codes Class Noted POA    Chronic respiratory failure with hypoxia (HCC) (Chronic) ICD-10-CM: J96.11  ICD-9-CM: 518.83, 799.02  Unknown Yes    Overview Signed 12/16/2019 10:11 PM by Helder Aguirre MD     On home oxygen inhalation at 3 LPM via NC             Opioid dependence ([facility] (Chronic) ICD-10-CM: F11.20  ICD-9-CM: 304.00  Unknown Yes    Overview Signed 12/16/2019 10:54 PM by Helder Aguirre MD     On Methadone             Acute blood loss as cause of postoperative anemia ICD-10-CM: D62  ICD-9-CM: 285.1  12/12/2019 Yes        Impaired mobility and ADLs ICD-10-CM: Z74.09  ICD-9-CM: 799.89  12/11/2019 Yes        Spinal cord compression ([facility] ICD-10-CM: G95.20  ICD-9-CM: 336.9  12/11/2019 Yes        Compression fracture of body of thoracic vertebra (HCC) ICD-10-CM: S22.000A  ICD-9-CM: 805.2  12/11/2019 Yes        * (Principal) Status post laminectomy with spinal fusion ICD-10-CM: Z98.1  ICD-9-CM: V45.4  12/11/2019 Yes    Overview Signed 12/16/2019 10:05 PM by Helder Aguirre MD     S/P T10, T9, T8 laminectomy; T7, T8, T9, T10, T11, T12 posterior thoracic fusion; segmental spinal instrumentation; K2M Davis type, T7-T8, T11-T12 (12/11/2019 - Dr. Radha Coburn)             History of fracture due to fall ICD-10-CM: Z87.81  ICD-9-CM: V15.51  12/11/2019 Yes        Hypoxemia requiring supplemental oxygen (Chronic) ICD-10-CM: R09.02, Z99.81  ICD-9-CM: 799.02  12/29/2014 Yes        Chronic obstructive pulmonary disease (COPD) (HCC) (Chronic) ICD-10-CM: J44.9  ICD-9-CM: 496  Unknown Yes    Overview Signed 4/23/2013 10:01 AM by Michael Hahn     SOB, on paula O2  Recent admission with psychosis             Hypertensive heart disease without heart failure (Chronic) ICD-10-CM: I11.9  ICD-9-CM: 402.90  Unknown Yes    Overview Signed 4/23/2013 10:02 AM by Claudette CALLES     Better controlled             Type 2 diabetes mellitus with diabetic neuropathy, with long-term current use of insulin ([facility] (Chronic) ICD-10-CM: E11.40, Z79.4  ICD-9-CM: 250.60, 357.2, V58.67  Unknown Yes    Overview Signed 12/18/2019  2:13 PM by Fco Gómez MD     HbA1c (12/11/2019) = 7.1                   Background:   Past Medical History:   Past Medical History:   Diagnosis Date    Abnormally low high density lipoprotein (HDL) cholesterol with hypertriglyceridemia     Lipid profile (11/6/2016) showed , , HDL 38, LDL 37    Acute blood loss as cause of postoperative anemia 12/12/2019    Anxiety     Bipolar affective disorder ([facility] 12/5/2012    Cellulitis of right forearm 05/04/2017    Chronic back pain     Chronic obstructive pulmonary disease (COPD) (HCC)     SOB, on paula O2 Recent admission with psychosis     Chronic respiratory failure with hypoxia (HCC)     On home oxygen inhalation at 3 LPM via NC    Contusion of left elbow 5/4/2017    Depression     Diabetic neuropathy associated with type 2 diabetes mellitus (HCC)     Diastolic dysfunction without heart failure     Stable on diuretics     Falls frequently     Gastroesophageal reflux disease with hiatal hernia     Generalized osteoarthritis of multiple sites     Gout     On Allopurinol    History of acute renal failure 5/31/2013    History of back injury     jumped out of second story window     History of fracture due to fall 12/11/2019    History of hepatitis C     treated    History of penicillin allergy     History of vitamin D deficiency 5/10/2017    Hypertensive heart disease without heart failure     Better controlled     Hyperuricemia 5/26/2017    Hypothyroidism     Hypoxemia requiring supplemental oxygen 12/29/2014    Intravenous drug user 5/2/2017    Long term current use of aspirin     Memory difficulty     Menopause     Mixed connective tissue disease ([facility] 5/10/2017    Obesity, Class I, BMI 30-34.9     Olecranon bursitis of right elbow 5/4/2017    Opioid dependence ([facility]     On Methadone    Recurrent genital herpes 5/31/2013    Right Achilles tendinitis 5/2/2017    Sarcoidosis     Sickle cell trait ([facility]     Tobacco use disorder     Type 2 diabetes mellitus with diabetic neuropathy, with long-term current use of insulin ([facility])     HbA1c (12/11/2019) = 7.1    Urge urinary incontinence 5/10/2017    Venous insufficiency     Wears glasses       Patient taking anticoagulants no    Patient has a defibrillator: no     Assessment:   Changes in Assessment throughout shift: None     Patient has central line: no Reasons if yes: Removed 12/16/19  Insertion date:n/a Last dressing date:n/a   Patient has Renae Cath: no Reasons if yes: n/a   Insertion date:n/a     Last Vitals:     Vitals:    12/31/19 1626 12/31/19 2236 01/01/20 0757 01/01/20 1544   BP: 107/62 131/72 118/60 122/71   Pulse: 81 73 77 90   Resp: 18 18 18 18   Temp: 97.6 °F (36.4 °C) 98 °F (36.7 °C) 98.8 °F (37.1 °C) 98.3 °F (36.8 °C)   SpO2: 93% 100% 99% 93%   Weight:       LMP: 11/25/2012        PAIN    Pain Assessment    Pain Intensity 1: 6 (01/01/20 1245) Pain Intensity 1: 2 (12/29/14 1105)    Pain Location 1: Rectal Pain Location 1: Abdomen    Pain Intervention(s) 1: Medication (see MAR) Pain Intervention(s) 1: Medication (see MAR)  Patient Stated Pain Goal: 0 Patient Stated Pain Goal: 0  o Intervention effective: yes    o Other actions taken for pain: no c/o     Skin Assessment  Skin color Skin Color: Appropriate for ethnicity  Condition/Temperature Skin Condition/Temp: Warm  Integrity Skin Integrity: Incision (comment)  Turgor Turgor: Non-tenting  Weekly Pressure Ulcer Documentation  Pressure  Injury Documentation: No Pressure Injury Noted-Pressure Ulcer Prevention Initiated  Wound Prevention & Protection Methods  Orientation of wound Orientation of Wound Prevention: Posterior  Location of Prevention Location of Wound Prevention: Buttocks, Sacrum/Coccyx  Dressing Present Dressing Present : No  Dressing Status Dressing Status: Intact  Wound Offloading Wound Offloading (Prevention Methods): Bed, pressure redistribution/air, Bed, pressure reduction mattress, Wheelchair     INTAKE/OUPUT  Date 12/31/19 0700 - 01/01/20 0659 01/01/20 0700 - 01/02/20 0659   Shift 0700-1859 1900-0659 24 Hour Total 0700-1859 1900-0659 24 Hour Total   INTAKE   Shift Total(mL/kg)         OUTPUT   Urine(mL/kg/hr)           Urine Occurrence(s) 1 x 2 x 3 x 2 x  2 x   Stool           Stool Occurrence(s)  0 x 0 x 1 x  1 x   Shift Total(mL/kg)         NET         Weight (kg) 42.9 42.9 42.9 42.9 42.9 42.9       Recommendations:  1. Patient needs and requests: met    2. Diet: Cardiac DM Reg /thin liq    3. Pending tests/procedures: none     4. Functional Level/Equipment: wheelchair    5. Estimated Discharge Date: TBD Posted on Whiteboard in Patients Room: yes     HEALS Safety Check    A safety check occurred in the patient's room between off going nurse and oncoming nurse listed above.     The safety check included the below items  Area Items   H  High Alert Medications - Verify all high alert medication drips (heparin, PCA, etc.)   E  Equipment - Suction is set up for ALL patients (with yanker)  - Red plugs utilized for all equipment (IV pumps, etc.)  - WOWs wiped down at end of shift.  - Room stocked with oxygen, suction, and other unit-specific supplies   A  Alarms - Bed alarm is set for fall risk patients  - Ensure chair alarm is in place and activated if patient is up in a chair   L  Lines - Check IV for any infiltration  - Renae bag is empty if patient has a Renae   - Tubing and IV bags are labeled S  Safety   - Room is clean, patient is clean, and equipment is clean. - Hallways are clear from equipment besides carts. - Fall bracelet on for fall risk patients  - Ensure room is clear and free of clutter  - Suction is set up for ALL patients (with lashay)  - Hallways are clear from equipment besides carts.    - Isolation precautions followed, supplies available outside room, sign posted

## 2020-01-01 NOTE — PROGRESS NOTES
SHIFT CHANGE NOTE FOR [facility]    Bedside and Verbal shift change report given to [address] [place] [address] (oncoming nurse) by Arely Canchola (offgoing nurse). Report included the following information SBAR, Kardex, MAR and Recent Results.     Situation:   Code Status: Full Code   Reason for Admission: Spinal Thoracic laminectomy T 6-T [address] [facility] [employer] Day: 16   Problem List:   Hospital Problems  Date Reviewed: 12/29/2019          Codes Class Noted POA    Chronic respiratory failure with hypoxia (HCC) (Chronic) ICD-10-CM: J96.11  ICD-9-CM: 518.83, 799.02  Unknown Yes    Overview Signed 12/16/2019 10:11 PM by Redd Marx MD     On home oxygen inhalation at 3 LPM via NC             Opioid dependence ([facility] (Chronic) ICD-10-CM: F11.20  ICD-9-CM: 304.00  Unknown Yes    Overview Signed 12/16/2019 10:54 PM by Redd Marx MD     On Methadone             Acute blood loss as cause of postoperative anemia ICD-10-CM: D62  ICD-9-CM: 285.1  12/12/2019 Yes        Impaired mobility and ADLs ICD-10-CM: Z74.09  ICD-9-CM: 799.89  12/11/2019 Yes        Spinal cord compression ([facility] ICD-10-CM: G95.20  ICD-9-CM: 336.9  12/11/2019 Yes        Compression fracture of body of thoracic vertebra (HCC) ICD-10-CM: S22.000A  ICD-9-CM: 805.2  12/11/2019 Yes        * (Principal) Status post laminectomy with spinal fusion ICD-10-CM: Z98.1  ICD-9-CM: V45.4  12/11/2019 Yes    Overview Signed 12/16/2019 10:05 PM by Redd Marx MD     S/P T10, T9, T8 laminectomy; T7, T8, T9, T10, T11, T12 posterior thoracic fusion; segmental spinal instrumentation; K2M Davis type, T7-T8, T11-T12 (12/11/2019 - Dr. Sultana Mendez)             History of fracture due to fall ICD-10-CM: Z87.81  ICD-9-CM: V15.51  12/11/2019 Yes        Hypoxemia requiring supplemental oxygen (Chronic) ICD-10-CM: R09.02, Z99.81  ICD-9-CM: 799.02  12/29/2014 Yes        Chronic obstructive pulmonary disease (COPD) (HCC) (Chronic) ICD-10-CM: J44.9  ICD-9-CM: 496  Unknown Yes    Overview Signed 4/23/2013 10:01 AM by Arlyn Chambers     SOB, on paula O2  Recent admission with psychosis             Hypertensive heart disease without heart failure (Chronic) ICD-10-CM: I11.9  ICD-9-CM: 402.90  Unknown Yes    Overview Signed 4/23/2013 10:02 AM by Gretchen CALLES     Better controlled             Type 2 diabetes mellitus with diabetic neuropathy, with long-term current use of insulin ([facility] (Chronic) ICD-10-CM: E11.40, Z79.4  ICD-9-CM: 250.60, 357.2, V58.67  Unknown Yes    Overview Signed 12/18/2019  2:13 PM by Elby Rinne, MD     HbA1c (12/11/2019) = 7.1                   Background:   Past Medical History:   Past Medical History:   Diagnosis Date    Abnormally low high density lipoprotein (HDL) cholesterol with hypertriglyceridemia     Lipid profile (11/6/2016) showed , , HDL 38, LDL 37    Acute blood loss as cause of postoperative anemia 12/12/2019    Anxiety     Bipolar affective disorder ([facility] 12/5/2012    Cellulitis of right forearm 05/04/2017    Chronic back pain     Chronic obstructive pulmonary disease (COPD) (HCC)     SOB, on paula O2 Recent admission with psychosis     Chronic respiratory failure with hypoxia (HCC)     On home oxygen inhalation at 3 LPM via NC    Contusion of left elbow 5/4/2017    Depression     Diabetic neuropathy associated with type 2 diabetes mellitus (HCC)     Diastolic dysfunction without heart failure     Stable on diuretics     Falls frequently     Gastroesophageal reflux disease with hiatal hernia     Generalized osteoarthritis of multiple sites     Gout     On Allopurinol    History of acute renal failure 5/31/2013    History of back injury     jumped out of second story window     History of fracture due to fall 12/11/2019    History of hepatitis C     treated    History of penicillin allergy     History of vitamin D deficiency 5/10/2017    Hypertensive heart disease without heart failure     Better controlled     Hyperuricemia 5/26/2017    Hypothyroidism     Hypoxemia requiring supplemental oxygen 12/29/2014    Intravenous drug user 5/2/2017    Long term current use of aspirin     Memory difficulty     Menopause     Mixed connective tissue disease ([facility] 5/10/2017    Obesity, Class I, BMI 30-34.9     Olecranon bursitis of right elbow 5/4/2017    [facility]     On Methadone    Recurrent genital herpes 5/31/2013    Right Achilles tendinitis 5/2/2017    Sarcoidosis     Sickle cell trait ([facility]     Tobacco use disorder     Type 2 diabetes mellitus with diabetic neuropathy, with long-term current use of insulin ([facility])     HbA1c (12/11/2019) = 7.1    Urge urinary incontinence 5/10/2017    Venous insufficiency     Wears glasses       Patient taking anticoagulants no    Patient has a defibrillator: no     Assessment:   Changes in Assessment throughout shift: None     Patient has central line: no Reasons if yes: Removed 12/16/19  Insertion date:n/a Last dressing date:n/a   Patient has Renae Cath: no Reasons if yes: n/a   Insertion date:n/a     Last Vitals:     Vitals:    12/30/19 2208 12/31/19 0809 12/31/19 1626 12/31/19 2236   BP: 119/76 119/79 107/62 131/72   Pulse: 76 90 81 73   Resp: 18 18 18 18   Temp: 98 °F (36.7 °C) 98.4 °F (36.9 °C) 97.6 °F (36.4 °C) 98 °F (36.7 °C)   SpO2: 100% 95% 93% 100%   Weight:       LMP: 11/25/2012        PAIN    Pain Assessment    Pain Intensity 1: 0 (01/01/20 0355) Pain Intensity 1: 2 (12/29/14 1105)    Pain Location 1: Back Pain Location 1: Abdomen    Pain Intervention(s) 1: Medication (see MAR) Pain Intervention(s) 1: Medication (see MAR)  Patient Stated Pain Goal: 0 Patient Stated Pain Goal: 0  o Intervention effective: yes    o Other actions taken for pain: no c/o     Skin Assessment  Skin color Skin Color: Appropriate for ethnicity  Condition/Temperature Skin Condition/Temp: Warm  Integrity Skin Integrity: Incision (comment)  Turgor Turgor: Non-tenting  Weekly Pressure Ulcer Documentation  Pressure  Injury Documentation: No Pressure Injury Noted-Pressure Ulcer Prevention Initiated  Wound Prevention & Protection Methods  Orientation of wound Orientation of Wound Prevention: Posterior  Location of Prevention Location of Wound Prevention: Sacrum/Coccyx  Dressing Present Dressing Present : No  Dressing Status Dressing Status: Intact  Wound Offloading Wound Offloading (Prevention Methods): Bed, pressure redistribution/air, Bed, pressure reduction mattress     INTAKE/OUPUT  Date 12/31/19 0700 - 01/01/20 0659 01/01/20 0700 - 01/02/20 0659   Shift 0700-1859 1900-0659 24 Hour Total 0700-1859 1900-0659 24 Hour Total   INTAKE   Shift Total(mL/kg)         OUTPUT   Urine(mL/kg/hr)           Urine Occurrence(s)  2 x 2 x      Stool           Stool Occurrence(s)  0 x 0 x      Shift Total(mL/kg)         NET         Weight (kg) 42.9 42.9 42.9 42.9 42.9 42.9       Recommendations:  1. Patient needs and requests: met    2. Diet: Cardiac DM Reg /thin liq    3. Pending tests/procedures: none     4. Functional Level/Equipment: wheelchair    5. Estimated Discharge Date: TBD Posted on Whiteboard in Patients Room: yes     HEALS Safety Check    A safety check occurred in the patient's room between off going nurse and oncoming nurse listed above.     The safety check included the below items  Area Items   H  High Alert Medications - Verify all high alert medication drips (heparin, PCA, etc.)   E  Equipment - Suction is set up for ALL patients (with yanker)  - Red plugs utilized for all equipment (IV pumps, etc.)  - WOWs wiped down at end of shift.  - Room stocked with oxygen, suction, and other unit-specific supplies   A  Alarms - Bed alarm is set for fall risk patients  - Ensure chair alarm is in place and activated if patient is up in a chair   L  Lines - Check IV for any infiltration  - Renae bag is empty if patient has a Renae   - Tubing and IV bags are labeled   S  Safety   - Room is clean, patient is clean, and equipment is clean. - Hallways are clear from equipment besides carts. - Fall bracelet on for fall risk patients  - Ensure room is clear and free of clutter  - Suction is set up for ALL patients (with lashay)  - Hallways are clear from equipment besides carts.    - Isolation precautions followed, supplies available outside room, sign posted

## 2020-01-01 NOTE — PROGRESS NOTES
Progress Note    Patient: Lina Sanchez MRN: [id number]  CSN: [id number]    YOB: 1955  Age: 62 y.o. Sex: female    DOA: 12/16/2019 LOS:  LOS: 16 days                    Subjective:     Primary Rehab Diagnosis: Impaired Mobility and ADLs secondary to:  1. S/P T10, T9, T8 laminectomy; T7, T8, T9, T10, T11, T12 posterior thoracic fusion; segmental spinal instrumentation; K2M Davis type, T7-T8, T11-T12 (12/11/2019 - Dr. Nuris Romero)  2. History of acute compression fractures of body of thoracic vertebrae (T9 and T10) due to fall    Pt seen and examined  C/o that her glucose level dropped to 57 review with her nurse record of hypoglycemia . Pt feeling anxious and c/o reflux sx. Pt sitting up in her wheelchair eating her lunch   Review of systems  General: No fevers or chills. on o2 By NC pt sitting on chair at PT  Cardiovascular: No chest pain or pressure. No palpitations. Pulmonary: No shortness of breath, cough or wheeze h/o sarcoidosis . Gastrointestinal: No abdominal pain, nausea, vomiting or diarrhea. Positive reflux sx   Genitourinary: Nodysuria. Musculoskeletal: No joint or muscle pain, no back pain, no recent trauma. Neurologic: No headache, no seizure    Objective:     Physical Exam:  Visit Vitals  /60 (BP 1 Location: Left arm, BP Patient Position: Sitting)   Pulse 77   Temp 98.8 °F (37.1 °C)   Resp 18   Wt 42.9 kg (94 lb 8 oz)   SpO2 99%   Breastfeeding No   BMI 15.73 kg/m²        General:         Alert, cooperative, no acute distress    HEENT: NC, Atraumatic. Lungs: CTA Bilaterally. No Wheezing/Rhonchi/Rales. Heart:  Regular  rhythm,   Abdomen: Soft, Non distended, Non tender.    Extremities: No lower limb edema   Psych:   . Not agitated. feeling anxious   Neurologic:  CN 2-12 grossly intact, Alert and oriented X 3.   At her base line Cranial nerves II-XII are grossly intact.  No gross sensory deficit.  Motor strength is 4-/5 on BUE, 2+/5 on the RLE (except for 1/5 on the right ankle), 2-/5 on the LLE (except for 2+/5 on the left knee). Intake and Output:  Current Shift:  No intake/output data recorded. Last three shifts:  No intake/output data recorded. Labs: Results:       Chemistry Recent Labs     12/30/19 0618   *      K 4.3      CO2 30   BUN 20*   CREA 0.85   CA 8.5   AGAP 7   BUCR 24*      CBC w/Diff Recent Labs     12/30/19 0618   HGB 9.8*   HCT 31.4*      Cardiac Enzymes No results for input(s): CPK, CKND1, SANTOS in the last 72 hours. No lab exists for component: CKRMB, TROIP   Coagulation No results for input(s): PTP, INR, APTT, INREXT, INREXT in the last 72 hours. Lipid Panel Lab Results   Component Value Date/Time    Cholesterol, total 141 09/30/2019 12:00 AM    HDL Cholesterol 39 (L) 09/30/2019 12:00 AM    LDL, calculated 61 09/30/2019 12:00 AM    VLDL, calculated 41 (H) 09/30/2019 12:00 AM    Triglyceride 204 (H) 09/30/2019 12:00 AM    CHOL/HDL Ratio 3.5 11/06/2016 04:18 AM      BNP No results for input(s): BNPP in the last 72 hours. Liver Enzymes No results for input(s): TP, ALB, TBIL, AP, SGOT, GPT in the last 72 hours.     No lab exists for component: DBIL   Thyroid Studies Lab Results   Component Value Date/Time    TSH 0.74 12/11/2019 05:37 PM          Procedures/imaging: see electronic medical records for all procedures/Xrays and details which were not copied into this note but were reviewed prior to creation of Plan    Medications:   Current Facility-Administered Medications   Medication Dose Route Frequency    insulin glargine (LANTUS) injection 25 Units  25 Units SubCUTAneous ACB    insulin lispro (HUMALOG) injection 4 Units  4 Units SubCUTAneous TIDAC    lubiPROStone (AMITIZA) capsule 24 mcg  24 mcg Oral DAILY WITH BREAKFAST    guaiFENesin ER (MUCINEX) tablet 600 mg  600 mg Oral Q12H    acetaminophen (TYLENOL) tablet 650 mg  650 mg Oral Q4H PRN    bisacodyl (DULCOLAX) tablet 10 mg  10 mg Oral Q48H PRN    ferrous sulfate tablet 325 mg  1 Tab Oral DAILY WITH BREAKFAST    ascorbic acid (vitamin C) (VITAMIN C) tablet 250 mg  250 mg Oral DAILY WITH BREAKFAST    insulin lispro (HUMALOG) injection   SubCUTAneous TIDAC    senna-docusate (PERICOLACE) 8.6-50 mg per tablet 2 Tab  2 Tab Oral PCD    methadone (DOLOPHINE) tablet 20 mg  20 mg Oral DAILY    oxyCODONE-acetaminophen (PERCOCET 10)  mg per tablet 1 Tab  1 Tab Oral Q4H PRN    predniSONE (DELTASONE) tablet 30 mg  30 mg Oral DAILY WITH BREAKFAST    famotidine (PEPCID) tablet 20 mg  20 mg Oral BID    rosuvastatin (CRESTOR) tablet 5 mg  5 mg Oral QHS    albuterol (PROVENTIL VENTOLIN) nebulizer solution 2.5 mg  2.5 mg Nebulization Q4H PRN    levothyroxine (SYNTHROID) tablet 75 mcg  75 mcg Oral 6am    cholecalciferol (VITAMIN D3) (400 Units /10 mcg) tablet 5 Tab  2,000 Units Oral DAILY    calcium citrate tablet 200 mg [elemental]  200 mg Oral BID    divalproex DR (DEPAKOTE) tablet 500 mg  500 mg Oral QHS    fluticasone-vilanterol (BREO ELLIPTA) 100mcg-25mcg/puff  1 Puff Inhalation DAILY    oxybutynin (DITROPAN) tablet 5 mg  5 mg Oral BID    allopurinol (ZYLOPRIM) tablet 100 mg  100 mg Oral DAILY    sertraline (ZOLOFT) tablet 50 mg  50 mg Oral DAILY    methocarbamol (ROBAXIN) tablet 500 mg  500 mg Oral Q8H    aspirin chewable tablet 81 mg  81 mg Oral DAILY WITH BREAKFAST    docusate sodium (COLACE) capsule 100 mg  100 mg Oral DAILY AFTER BREAKFAST       Assessment/Plan     Principal Problem:    Status post laminectomy with spinal fusion (12/11/2019)      Overview: S/P T10, T9, T8 laminectomy; T7, T8, T9, T10, T11, T12 posterior thoracic       fusion; segmental spinal instrumentation; K2M [place] type, T7-T8, T11-T12       (12/11/2019 - Dr. Susan Samson)    Active Problems:    Chronic obstructive pulmonary disease (COPD) ([facility] ()      Overview: SOB, on paula O2      Recent admission with psychosis      Hypertensive heart disease without heart failure ()      Overview: Better controlled      Type 2 diabetes mellitus with diabetic neuropathy, with long-term current use of insulin (HCC) ()      Overview: HbA1c (12/11/2019) = 7.1      Hypoxemia requiring supplemental oxygen (12/29/2014)      Impaired mobility and ADLs (12/11/2019)      Spinal cord compression (HCC) (12/11/2019)      Compression fracture of body of thoracic vertebra (HCC) (12/11/2019)      Acute blood loss as cause of postoperative anemia (12/12/2019)      History of fracture due to fall (12/11/2019)      Chronic respiratory failure with hypoxia (HCC) ()      Overview: On home oxygen inhalation at 3 LPM via NC      Opioid dependence ([facility] ()      Overview: On Methadone      Plan  S/P T10, T9, T8 laminectomy; T7, T8, T9, T10, T11, T12 posterior thoracic fusion; segmental spinal instrumentation; K2M [place] type, T7-T8, T11-T12 (12/11/2019 - Dr. Jamel Yi);  History of acute compression fractures of body of thoracic vertebrae (T9 and T10) due to fall  Thoracic spinal precautions  Calcium citrate 200 mg PO BID   Cholecalciferol 2,000 units PO once daily    Acute Postoperative Blood Loss Anemia   FeSO4 325 mg PO once daily with breakfast   Ascorbic Acid 250 mg PO once daily with breakfast     Anxiety  Will try Buspar 5 mg TID    GERD   Change Pepcid to Protonix 40 mg daily  Tums TID prn    Benign hypertensive heart and kidney disease with stage 3 chronic kidney disease without congestive heart failure  Metolazone was not given during the patient's hospital stay at [facility]     Chronic obstructive pulmonary disease; Chronic respiratory failure with hypoxemia requiring supplemental oxygen (3 LPM)  Fluticasone-Vilanterol 100-25 1 puff inhaled once daily   O2 inhalation at 3 LPM via nasal cannula continuous    Opioid dependence  Continue Methadone 20 mg PO once daily     Type 2 diabetes mellitus with stage 3 chronic kidney disease  Insulin glargine 25 units PO once daily before breakfast   Insulin lispro 4 units SC TID AC    Insulin lispro sliding scale SC TID AC only     Cough  Continue Guaifenesin  mg PO q 12 hr  Cough improved today continue to monitor sx    Claudean Shields, MD  1/1/2020 1:45 PM

## 2020-01-02 NOTE — PROGRESS NOTES
Problem: Diabetes Self-Management  Goal: *Disease process and treatment process  Description  Define diabetes and identify own type of diabetes; list 3 options for treating diabetes. Outcome: Progressing Towards Goal  Goal: *Incorporating nutritional management into lifestyle  Description  Describe effect of type, amount and timing of food on blood glucose; list 3 methods for planning meals. Outcome: Progressing Towards Goal  Goal: *Incorporating physical activity into lifestyle  Description  State effect of exercise on blood glucose levels. Outcome: Progressing Towards Goal  Goal: *Developing strategies to promote health/change behavior  Description  Define the ABC's of diabetes; identify appropriate screenings, schedule and personal plan for screenings. Outcome: Progressing Towards Goal  Goal: *Using medications safely  Description  State effect of diabetes medications on diabetes; name diabetes medication taking, action and side effects. Outcome: Progressing Towards Goal  Goal: *Monitoring blood glucose, interpreting and using results  Description  Identify recommended blood glucose targets  and personal targets. Outcome: Progressing Towards Goal  Goal: *Prevention, detection, treatment of acute complications  Description  List symptoms of hyper- and hypoglycemia; describe how to treat low blood sugar and actions for lowering  high blood glucose level. Outcome: Progressing Towards Goal  Goal: *Prevention, detection and treatment of chronic complications  Description  Define the natural course of diabetes and describe the relationship of blood glucose levels to long term complications of diabetes.   Outcome: Progressing Towards Goal  Goal: *Developing strategies to address psychosocial issues  Description  Describe feelings about living with diabetes; identify support needed and support network  Outcome: Progressing Towards Goal  Goal: *Insulin pump training  Outcome: Progressing Towards Goal  Goal: *Sick day guidelines  Outcome: Progressing Towards Goal  Goal: *Patient Specific Goal (EDIT GOAL, INSERT TEXT)  Outcome: Progressing Towards Goal     Problem: Patient Education: Go to Patient Education Activity  Goal: Patient/Family Education  Outcome: Progressing Towards Goal     Problem: Falls - Risk of  Goal: *Absence of Falls  Description  Document Dee Stewart Fall Risk and appropriate interventions in the flowsheet. Outcome: Progressing Towards Goal  Note: Fall Risk Interventions:  Mobility Interventions: Bed/chair exit alarm, Patient to call before getting OOB    Mentation Interventions: Adequate sleep, hydration, pain control    Medication Interventions: Bed/chair exit alarm    Elimination Interventions: Bed/chair exit alarm    History of Falls Interventions: Bed/chair exit alarm         Problem: Patient Education: Go to Patient Education Activity  Goal: Patient/Family Education  Outcome: Progressing Towards Goal     Problem: Pressure Injury - Risk of  Goal: *Prevention of pressure injury  Description  Document Florian Scale and appropriate interventions in the flowsheet.   Outcome: Progressing Towards Goal  Note: Pressure Injury Interventions:  Sensory Interventions: Assess changes in LOC    Moisture Interventions: Absorbent underpads    Activity Interventions: Increase time out of bed, Pressure redistribution bed/mattress(bed type)    Mobility Interventions: HOB 30 degrees or less    Nutrition Interventions: Document food/fluid/supplement intake    Friction and Shear Interventions: HOB 30 degrees or less                Problem: Patient Education: Go to Patient Education Activity  Goal: Patient/Family Education  Outcome: Progressing Towards Goal     Problem: Infection - Risk of, Surgical Site Infection  Goal: *Absence of surgical site infection signs and symptoms  Outcome: Progressing Towards Goal     Problem: Patient Education: Go to Patient Education Activity  Goal: Patient/Family Education  Outcome: Progressing Towards Goal     Problem: Pain  Goal: *Control of Pain  Outcome: Progressing Towards Goal  Goal: *PALLIATIVE CARE:  Alleviation of Pain  Outcome: Progressing Towards Goal     Problem: Patient Education: Go to Patient Education Activity  Goal: Patient/Family Education  Outcome: Progressing Towards Goal     Problem: Injury - Risk of, Adverse Drug Event  Goal: *Absence of adverse drug events  Outcome: Progressing Towards Goal  Goal: *Absence of medication errors  Outcome: Progressing Towards Goal  Goal: *Knowledge of prescribed medications  Outcome: Progressing Towards Goal     Problem: Patient Education: Go to Patient Education Activity  Goal: Patient/Family Education  Outcome: Progressing Towards Goal     Problem: Inpatient Rehab (Adult)  Goal: *LTG: Avoids injury/falls 100% of time related to deficits  Outcome: Progressing Towards Goal  Goal: *LTG: Avoids infection 100% of time related to deficits  Outcome: Progressing Towards Goal  Goal: *LTG: Verbalize understanding of diagnosis and risk factors for recurring stroke  Outcome: Progressing Towards Goal  Goal: *LTG: Absence of DVT during hospitalization  Outcome: Progressing Towards Goal  Goal: *LTG: Maintains Skin Integrity With No Evidence of Pressure Injury 100% of Time  Outcome: Progressing Towards Goal  Goal: Interventions  Outcome: Progressing Towards Goal     Problem: Patient Education: Go to Patient Education Activity  Goal: Patient/Family Education  Outcome: Progressing Towards Goal

## 2020-01-02 NOTE — PROGRESS NOTES
[facility] PHYSICAL REHABILITATION  [address] [place] [address], [address]     INPATIENT REHABILITATION  DAILY PROGRESS NOTE     Date: 1/2/2020    Name: Airam Valadez Age / Sex: 62 y.o. / female   CSN: [id number] MRN: [id number]   [facility] Date: 12/16/2019 Length of Stay: 17 days     Primary Rehab Diagnosis: Impaired Mobility and ADLs secondary to:  1. S/P T10, T9, T8 laminectomy; T7, T8, T9, T10, T11, T12 posterior thoracic fusion; segmental spinal instrumentation; K2M [place] type, T7-T8, T11-T12 (12/11/2019 - Dr. Ish Joshi)  2.  History of acute compression fractures of body of thoracic vertebrae (T9 and T10) due to fall      Subjective:     I personally saw and evaluated this patient on 1/2/2020  Patient sitting in a chair in NAD, awake, alert, denies sob    Objective:     Vital Signs:  Patient Vitals for the past 24 hrs:   BP Temp Pulse Resp SpO2   01/02/20 1553 135/81 97.2 °F (36.2 °C) 84 18 99 %   01/02/20 0746 136/80 98.5 °F (36.9 °C) 78 18 100 %   01/01/20 2137 123/70 98 °F (36.7 °C) 69 18 99 %        Physical Examination:  General:  Awake, alert  Cardiovascular:  S1S2+, RRR  Pulmonary:  Coarse bs b/l  GI:  Soft, BS+, NT, ND  Extremities:  No edema        Current Medications:  Current Facility-Administered Medications   Medication Dose Route Frequency    busPIRone (BUSPAR) tablet 5 mg  5 mg Oral TID    pantoprazole (PROTONIX) tablet 40 mg  40 mg Oral ACB    calcium carbonate (TUMS) chewable tablet 200 mg [elemental]  200 mg Oral TID PRN    insulin glargine (LANTUS) injection 25 Units  25 Units SubCUTAneous ACB    insulin lispro (HUMALOG) injection 4 Units  4 Units SubCUTAneous TIDAC    lubiPROStone (AMITIZA) capsule 24 mcg  24 mcg Oral DAILY WITH BREAKFAST    guaiFENesin ER (MUCINEX) tablet 600 mg  600 mg Oral Q12H    acetaminophen (TYLENOL) tablet 650 mg  650 mg Oral Q4H PRN    bisacodyl (DULCOLAX) tablet 10 mg  10 mg Oral Q48H PRN    ferrous sulfate tablet 325 mg  1 Tab Oral DAILY WITH BREAKFAST    ascorbic acid (vitamin C) (VITAMIN C) tablet 250 mg  250 mg Oral DAILY WITH BREAKFAST    insulin lispro (HUMALOG) injection   SubCUTAneous TIDAC    senna-docusate (PERICOLACE) 8.6-50 mg per tablet 2 Tab  2 Tab Oral PCD    methadone (DOLOPHINE) tablet 20 mg  20 mg Oral DAILY    oxyCODONE-acetaminophen (PERCOCET 10)  mg per tablet 1 Tab  1 Tab Oral Q4H PRN    predniSONE (DELTASONE) tablet 30 mg  30 mg Oral DAILY WITH BREAKFAST    rosuvastatin (CRESTOR) tablet 5 mg  5 mg Oral QHS    albuterol (PROVENTIL VENTOLIN) nebulizer solution 2.5 mg  2.5 mg Nebulization Q4H PRN    levothyroxine (SYNTHROID) tablet 75 mcg  75 mcg Oral 6am    cholecalciferol (VITAMIN D3) (400 Units /10 mcg) tablet 5 Tab  2,000 Units Oral DAILY    calcium citrate tablet 200 mg [elemental]  200 mg Oral BID    divalproex DR (DEPAKOTE) tablet 500 mg  500 mg Oral QHS    fluticasone-vilanterol (BREO ELLIPTA) 100mcg-25mcg/puff  1 Puff Inhalation DAILY    oxybutynin (DITROPAN) tablet 5 mg  5 mg Oral BID    allopurinol (ZYLOPRIM) tablet 100 mg  100 mg Oral DAILY    sertraline (ZOLOFT) tablet 50 mg  50 mg Oral DAILY    methocarbamol (ROBAXIN) tablet 500 mg  500 mg Oral Q8H    aspirin chewable tablet 81 mg  81 mg Oral DAILY WITH BREAKFAST    docusate sodium (COLACE) capsule 100 mg  100 mg Oral DAILY AFTER BREAKFAST       Allergies:   Allergies   Allergen Reactions    Moxifloxacin Rash    Pcn [Penicillins] Swelling    Sulfa (Sulfonamide Antibiotics) Swelling       Functional Progress:    PHYSICAL THERAPY    ON ADMISSION MOST RECENT   Wheelchair Mobility/Management  Able to Propel (ft): 140 feet(max A for steering)  Functional Level: 2  Curbs/Ramps Assist Required (FIM Score): 0 (Not tested)  Wheelchair Setup Assist Required : 2 (Maximal assistance)  Wheelchair Management: Manages left brake(needing assist with brakes) Wheelchair Mobility/Management  Able to Propel (ft): (50 ft)  Functional Level: (5)  Curbs/Ramps Assist Required (FIM Score): 0 (Not tested)  Wheelchair Setup Assist Required : 2 (Maximal assistance)  Wheelchair Management: Manages left brake, Manages right brake     Gait  Amount of Assistance: 0 (Not tested)  Distance (ft): 0 Feet (ft)  Assistive Device: (N/A) Gait  Amount of Assistance: 0 (Not tested)  Distance (ft): 0 Feet (ft)  Assistive Device: (N/A)     Balance-Sitting/Standing  Sitting - Static: Fair (occasional)  Sitting - Dynamic: Fair (occasional), Occassional, Poor (constant support)  Standing - Static: Poor, Constant support  Standing - Dynamic : (not tested) Balance-Sitting/Standing  Sitting - Static: Fair (occasional)  Sitting - Dynamic: Fair (occasional)  Standing - Static: Poor  Standing - Dynamic : Impaired     Bed/Mat Mobility  Rolling Right : 2 (Maximal assistance)  Rolling Left : 2 (Maximal assistance)  Supine to Sit : 2 (Maximal assistance)  Sit to Supine : 2 (Maximal assistance) Bed/Mat Mobility  Rolling Right : 4 (Minimal assistance)  Rolling Left : 4 (Minimal assistance)  Supine to Sit : 3 (Moderate assistance)  Sit to Supine : 3 (Moderate assistance)     Transfers  Transfer Type: Lateral with transfer board  Transfer Assistance : 1 (Total assistance)(Max A x 2 for safety, sequencing, appropriate ant trunk )  Sit to Stand Assistance:  Total assistance  Car Transfers: Not tested  Car Type: N/A Transfers  Transfer Type: Lateral with transfer board  Transfer Assistance : 3 (Moderate assistance )  Sit to Stand Assistance: Maximum assistance  Car Transfers: Not tested  Car Type: N/A     Steps or Stairs  Steps/Stairs Ambulated (#): 0  Level of Assist : 0 (Not tested)  Rail Use: (N/A) Steps or Stairs  Steps/Stairs Ambulated (#): (0)  Level of Assist : 0 (Not tested)  Rail Use: (N/A)         Lab/Data Review:  Recent Results (from the past 24 hour(s))   GLUCOSE, POC    Collection Time: 01/01/20  9:32 PM   Result Value Ref Range    Glucose (POC) 185 (H) 70 - 110 mg/dL   GLUCOSE, POC    Collection Time: 01/02/20  7:48 AM   Result Value Ref Range    Glucose (POC) 123 (H) 70 - 110 mg/dL   GLUCOSE, POC    Collection Time: 01/02/20 11:42 AM   Result Value Ref Range    Glucose (POC) 145 (H) 70 - 110 mg/dL   GLUCOSE, POC    Collection Time: 01/02/20  4:56 PM   Result Value Ref Range    Glucose (POC) 319 (H) 70 - 110 mg/dL           Assessment:     Primary Rehabilitation Diagnosis  1. Impaired Mobility and ADLs  2. S/P T10, T9, T8 laminectomy; T7, T8, T9, T10, T11, T12 posterior thoracic fusion; segmental spinal instrumentation; K2M [place] type, T7-T8, T11-T12 (12/11/2019 - Dr. Shruti Blue)  3. History of acute compression fractures of body of thoracic vertebrae (T9 and T10) due to fall     Comorbidities   Diabetic neuropathy associated with type 2 diabetes mellitus (HCC) E11.40    Venous insufficiency I87.2    Obesity, Class I, BMI 30-34.9 E66.9    Chronic back pain M54.9, G89.29    Generalized osteoarthritis of multiple sites M15.9    Bipolar affective disorder  F31.9    Abnormally low high density lipoprotein (HDL) cholesterol with hypertriglyceridemia E78.6, A34.5    Diastolic dysfunction without heart failure I51.89    Chronic obstructive pulmonary disease (COPD)  J44.9    Hypertensive heart disease without heart failure I11.9    Depression F32.9    History of back injury Z87.828    Type 2 diabetes mellitus with diabetic neuropathy, with long-term current use of insulin  E11.40, Z79.4    Anxiety F41.9    Wears glasses Z97.3    History of hepatitis C Z86.19    Sickle cell trait D57.3    History of acute renal failure Z87.448    Hypothyroidism E03.9    Recurrent genital herpes A60.00    Sarcoidosis D86.9    Abscess of right arm L02.413    Hypoxemia requiring supplemental oxygen R09.02, Z99.81    Abnormal nuclear stress test R94.39    Cellulitis and abscess of hand L03.119, L02.519    Cellulitis and abscess of foot L03.119, L02.619    Cellulitis of arm, right L03. [address] bursitis of right elbow M70.21    Contusion of left elbow S50. 02XA    Intravenous drug user F19.90    Right Achilles tendinitis M76.61    History of penicillin allergy Z88.0    Falls frequently R29.6    Gastroesophageal reflux disease with hiatal hernia K21.9, K44.9    Memory difficulty R41.3    Mixed connective tissue disease  M35.1    Hyperuricemia E79.0    History of vitamin D deficiency Z86.39    Urge urinary incontinence N39.41    Acute blood loss as cause of postoperative anemia D62    History of fracture due to fall Z87.81    Chronic respiratory failure with hypoxia  J96.11    Cellulitis of right forearm L03. 113    Gout M10.9    Menopause Z78.0    Long term current use of aspirin Z79.82    Opioid dependence  F11.20    Tobacco use disorder F17.200        Plan:     1. Justification for continued stay: Good progression towards established rehabilitation goals. 2. Medical Issues being followed closely:    [x]  Fall and safety precautions     []  Wound Care     [x]  Bowel and Bladder Function     [x]  Fluid Electrolyte and Nutrition Balance     []  Pain Control      3. Issues that 24 hour rehabilitation nursing is following:    [x]  Fall and safety precautions     []  Wound Care     [x]  Bowel and Bladder Function     [x]  Fluid Electrolyte and Nutrition Balance     []  Pain Control      [x]  Assistance with and education on in-room safety with transfers to and from the bed, wheelchair, toilet and shower. 4. Acute rehabilitation plan of care:    [x]  Continue current care and rehab. [x]  Physical Therapy           [x]  Occupational Therapy           []  Speech Therapy     []  Hold Rehab until further notice     5. Medications:    [x]  MAR Reviewed     [x]  Continue Present Medications     6. DVT Prophylaxis:      []  Lovenox     []  Unfractionated Heparin     []  Coumadin     []  NOAC     [x]  ROBERT Stockings     []  Sequential Compression Device     []  None     7.  Orders:   > S/P T10, T9, T8 laminectomy; T7, T8, T9, T10, T11, T12 posterior thoracic fusion; segmental spinal instrumentation; K2M [place] type, T7-T8, T11-T12 (12/11/2019 - Dr. Nuris Bradshaw); History of acute compression fractures of body of thoracic vertebrae (T9 and T10) due to fall   Spinal precautions    > Acute Postoperative Blood Loss Anemia   > iron, vit C    > Benign hypertensive heart and kidney disease with stage 3 chronic kidney disease without congestive heart failure   >monitor    > Chronic obstructive pulmonary disease; Chronic respiratory failure with hypoxemia requiring supplemental oxygen (3 LPM)   > O2, breoellipta    > Opioid dependence   > on methadone    > Type 2 diabetes mellitus with stage 3 chronic kidney disease   Lantus, ssi      > Constipation   >on bowel regimen    > Analgesia   > Continue:    > Acetaminophen 650 mg PO q 4 hr PRN for pain level less than 5/10    > Methocarbamol 500 mg PO q 8 hr    > Percocet 10/325 1 tab PO q 4 hr PRN for pain level greater than 4/10     > Diet:   > Specifications: Cardiac, diabetic consistent carb 1800 kcal, no concentrated sweets, low purine   > Solids (consistency): Regular    > Liquids (consistency):  Thin       D/w patient, d/w Nutritionist    Signed:        January 2, 2020

## 2020-01-02 NOTE — PROGRESS NOTES
SHIFT CHANGE NOTE FOR [facility]    Bedside and Verbal shift change report given to Snow Solis LPN (oncoming nurse) by Wes Bennett LPN (offgoing nurse). Report included the following information SBAR, Kardex, MAR and Recent Results.     Situation:   Code Status: Full Code   Reason for Admission: Spinal Thoracic laminectomy T 6-T 6510 [employer] Day: 17   Problem List:   Hospital Problems  Date Reviewed: 12/29/2019          Codes Class Noted POA    Chronic respiratory failure with hypoxia (HCC) (Chronic) ICD-10-CM: J96.11  ICD-9-CM: 518.83, 799.02  Unknown Yes    Overview Signed 12/16/2019 10:11 PM by Milo Khan MD     On home oxygen inhalation at 3 LPM via NC             Opioid dependence ([facility] [address] (Chronic) ICD-10-CM: F11.20  ICD-9-CM: 304.00  Unknown Yes    Overview Signed 12/16/2019 10:54 PM by Milo Khan MD     On Methadone             Acute blood loss as cause of postoperative anemia ICD-10-CM: D62  ICD-9-CM: 285.1  12/12/2019 Yes        Impaired mobility and ADLs ICD-10-CM: Z74.09  ICD-9-CM: 799.89  12/11/2019 Yes        Spinal cord compression ([facility] [address] ICD-10-CM: G95.20  ICD-9-CM: 336.9  12/11/2019 Yes        Compression fracture of body of thoracic vertebra (HCC) ICD-10-CM: S22.000A  ICD-9-CM: 805.2  12/11/2019 Yes        * (Principal) Status post laminectomy with spinal fusion ICD-10-CM: Z98.1  ICD-9-CM: V45.4  12/11/2019 Yes    Overview Signed 12/16/2019 10:05 PM by Milo Khan MD     S/P T10, T9, T8 laminectomy; T7, T8, T9, T10, T11, T12 posterior thoracic fusion; segmental spinal instrumentation; K2M [place] type, T7-T8, T11-T12 (12/11/2019 - Dr. Harsha Amin)             History of fracture due to fall ICD-10-CM: Z87.81  ICD-9-CM: V15.51  12/11/2019 Yes        Hypoxemia requiring supplemental oxygen (Chronic) ICD-10-CM: R09.02, Z99.81  ICD-9-CM: 799.02  12/29/2014 Yes        Chronic obstructive pulmonary disease (COPD) (HCC) (Chronic) ICD-10-CM: J44.9  ICD-9-CM: 496  Unknown Yes    Overview Signed 4/23/2013 10:01 AM by Drew Payne     SOB, on paula O2  Recent admission with psychosis             Hypertensive heart disease without heart failure (Chronic) ICD-10-CM: I11.9  ICD-9-CM: 402.90  Unknown Yes    Overview Signed 4/23/2013 10:02 AM by Elvie CALLES     Better controlled             Type 2 diabetes mellitus with diabetic neuropathy, with long-term current use of insulin ([facility] [address] (Chronic) ICD-10-CM: E11.40, Z79.4  ICD-9-CM: 250.60, 357.2, V58.67  Unknown Yes    Overview Signed 12/18/2019  2:13 PM by Sebas Rosario MD     HbA1c (12/11/2019) = 7.1                   Background:   Past Medical History:   Past Medical History:   Diagnosis Date    Abnormally low high density lipoprotein (HDL) cholesterol with hypertriglyceridemia     Lipid profile (11/6/2016) showed , , HDL 38, LDL 37    Acute blood loss as cause of postoperative anemia 12/12/2019    Anxiety     Bipolar affective disorder ([facility] [address] 12/5/2012    Cellulitis of right forearm 05/04/2017    Chronic back pain     Chronic obstructive pulmonary disease (COPD) (HCC)     SOB, on paula O2 Recent admission with psychosis     Chronic respiratory failure with hypoxia (HCC)     On home oxygen inhalation at 3 LPM via NC    Contusion of left elbow 5/4/2017    Depression     Diabetic neuropathy associated with type 2 diabetes mellitus (HCC)     Diastolic dysfunction without heart failure     Stable on diuretics     Falls frequently     Gastroesophageal reflux disease with hiatal hernia     Generalized osteoarthritis of multiple sites     Gout     On Allopurinol    History of acute renal failure 5/31/2013    History of back injury     jumped out of second story window     History of fracture due to fall 12/11/2019    History of hepatitis C     treated    History of penicillin allergy     History of vitamin D deficiency 5/10/2017    Hypertensive heart disease without heart failure     Better controlled     Hyperuricemia 5/26/2017    Hypothyroidism     Hypoxemia requiring supplemental oxygen 12/29/2014    Intravenous drug user 5/2/2017    Long term current use of aspirin     Memory difficulty     Menopause     Mixed connective tissue disease ([facility] [address] 5/10/2017    Obesity, Class I, BMI 30-34.9     Olecranon bursitis of right elbow 5/4/2017    Opioid dependence ([facility]     On Methadone    Recurrent genital herpes 5/31/2013    Right Achilles tendinitis 5/2/2017    Sarcoidosis     Sickle cell trait ([facility]     Tobacco use disorder     Type 2 diabetes mellitus with diabetic neuropathy, with long-term current use of insulin ([facility])     HbA1c (12/11/2019) = 7.1    Urge urinary incontinence 5/10/2017    Venous insufficiency     Wears glasses       Patient taking anticoagulants no    Patient has a defibrillator: no     Assessment:   Changes in Assessment throughout shift: None     Patient has central line: no Reasons if yes: Removed 12/16/19  Insertion date:n/a Last dressing date:n/a   Patient has Renae Cath: no Reasons if yes: n/a   Insertion date:n/a     Last Vitals:     Vitals:    12/31/19 2236 01/01/20 0757 01/01/20 1544 01/01/20 2137   BP: 131/72 118/60 122/71 123/70   Pulse: 73 77 90 69   Resp: 18 18 18 18   Temp: 98 °F (36.7 °C) 98.8 °F (37.1 °C) 98.3 °F (36.8 °C) 98 °F (36.7 °C)   SpO2: 100% 99% 93% 99%   Weight:       LMP: 11/25/2012        PAIN    Pain Assessment    Pain Intensity 1: 0 (01/02/20 0400) Pain Intensity 1: 2 (12/29/14 1105)    Pain Location 1: Back Pain Location 1: Abdomen    Pain Intervention(s) 1: Medication (see MAR) Pain Intervention(s) 1: Medication (see MAR)  Patient Stated Pain Goal: 0 Patient Stated Pain Goal: 0  o Intervention effective: yes    o Other actions taken for pain: no c/o     Skin Assessment  Skin color Skin Color: Appropriate for ethnicity  Condition/Temperature Skin Condition/Temp: Warm  Integrity Skin Integrity: Lesion (comment)  Turgor Turgor: Non-tenting  Weekly Pressure Ulcer Documentation  Pressure  Injury Documentation: No Pressure Injury Noted-Pressure Ulcer Prevention Initiated  Wound Prevention & Protection Methods  Orientation of wound Orientation of Wound Prevention: Posterior  Location of Prevention Location of Wound Prevention: Buttocks, Sacrum/Coccyx  Dressing Present Dressing Present : No  Dressing Status Dressing Status: Intact  Wound Offloading Wound Offloading (Prevention Methods): Bed, pressure redistribution/air, Bed, pressure reduction mattress, Wheelchair     INTAKE/OUPUT  Date 01/01/20 0700 - 01/02/20 0659 01/02/20 0700 - 01/03/20 0659   Shift 0700-1859 1900-0659 24 Hour Total 0700-1859 1900-0659 24 Hour Total   INTAKE   Shift Total(mL/kg)         OUTPUT   Urine(mL/kg/hr)           Urine Occurrence(s) 2 x 3 x 5 x      Stool           Stool Occurrence(s) 1 x 2 x 3 x      Shift Total(mL/kg)         NET         Weight (kg) 42.9 42.9 42.9 42.9 42.9 42.9       Recommendations:  1. Patient needs and requests: met    2. Diet: Cardiac DM Reg /thin liq    3. Pending tests/procedures: none     4. Functional Level/Equipment: wheelchair    5. Estimated Discharge Date: TBD Posted on Whiteboard in Patients Room: yes     HEALS Safety Check    A safety check occurred in the patient's room between off going nurse and oncoming nurse listed above.     The safety check included the below items  Area Items   H  High Alert Medications - Verify all high alert medication drips (heparin, PCA, etc.)   E  Equipment - Suction is set up for ALL patients (with yanker)  - Red plugs utilized for all equipment (IV pumps, etc.)  - WOWs wiped down at end of shift.  - Room stocked with oxygen, suction, and other unit-specific supplies   A  Alarms - Bed alarm is set for fall risk patients  - Ensure chair alarm is in place and activated if patient is up in a chair   L  Lines - Check IV for any infiltration  - Renae bag is empty if patient has a Renae   - Tubing and IV bags are labeled   S  Safety - Room is clean, patient is clean, and equipment is clean. - Hallways are clear from equipment besides carts. - Fall bracelet on for fall risk patients  - Ensure room is clear and free of clutter  - Suction is set up for ALL patients (with lashay)  - Hallways are clear from equipment besides carts.    - Isolation precautions followed, supplies available outside room, sign posted

## 2020-01-02 NOTE — PROGRESS NOTES
Problem: Self Care Deficits Care Plan (Adult)  Goal: *Therapy Goal (Edit Goal, Insert Text)  Description  Occupational Therapy Goals   Long Term Goals  Initiated 19 and to be accomplished within 4 week(s)  to facilitate increased self care independence and strength for return to PLOF and decreased care giver burden:   2. Pt will perform grooming with Mod I.   3. Pt will perform UB bathing with SBA. 4. Pt will perform LB bathing with Tyree. 5. Pt will perform tub/shower transfer <> TTB with Min A.   6. Pt will perform UB dressing with Set-up. 7. Pt will perform LB dressing with Min A.  8. Pt will perform toileting task with Min A.  9. Pt will perform toilet transfer with Min A/CGA. Short Term Goals   Initiated 19 and to be accomplished within 7 day(s) (2020) to facilitate increased self care independence and strength for return to PLOF and decreased care giver burden:   1. Pt will perform self-feeding with Mod I.   2. Pt will perform grooming with set-up. ()  3. Pt will perform UB bathing with Mod A. ()  4. Pt will perform LB bathing with Mod A in LRE. ()  5. Pt will perform tub/shower transfer <> TTB with MaxA x 1.-   6. Pt will perform UB dressing with SBA. -   7. Pt will perform LB dressing with Mod A using AE as needed. ()  8. Pt will perform toileting task with Max A x 1. ()  9. Pt will perform toilet transfer with MaxA x 1. Outcome Measures:  (19) Dynamometer  strength: Right= 27.33# (norm=55. 1#) Left= 34.67# (norm=45.7#)  (19) 9-hole peg test: Right=45.46 seconds ; Left=41.13 seconds        Outcome: Progressing Towards Goal   OCCUPATIONAL THERAPY TREATMENT    Patient: Candance Ralph Roots   62 y.o.     Patient identified with name and : Yes     Date: 2020    First Tx Session  Time In: 0930  Time Out[de-identified] 1450    Second Tx Session  Time In: 1330   Time Out[de-identified] 1400    Diagnosis: Status post laminectomy with spinal fusion [Z98.1]   Precautions: Chart, occupational therapy assessment, plan of care, and goals were reviewed. Pain:  Pt reports 8/10 pain or discomfort prior to treatment; pt reporting back spasms  Pt reports 8/10 pain or discomfort post treatment. Intervention Provided: RN provided pain medication towards end of session    Second session pain:   Pt did not report pain this session    SUBJECTIVE:   Patient stated Penny Randall me out, baby; please.   \"Hold on, my friend is bringing me a sandwhich\" (pt educated on importance of starting session on time)   OBJECTIVE DATA SUMMARY:     THERAPEUTIC ACTIVITY Daily Assessment   FM task to increase grasp/functional use in prep for dressing  Pt performed overedge lacing technique using lacing board to improve FM coordination; min cues for technique and tending to task. Added 1# weights to B wrists to assist with tremors. Increased time for task. Pt then unbuttoned/buttoned using practice board (med size buttons) with mild difficulty. Increased difficulty with smaller buttons on shirt, however pt able to accomplish given increased time. IADL Daily Assessment   Laundry task  Seated in w/c, pt was handed laundry items individually. She shook them out and placed them in dryer. Once laundry was dry, therapist handed to pt and she folded shirts individually. Pt requested assistance to turn shirts right-side-out, however pt is capable of completing this task. Assisted pt with placing items on  and in closet. LOWER BODY DRESSING Daily Assessment   Second session Lower Body Dressing   Dressing Assistance : 2 (Maximal assistance)  Leg Crossed Method Used: No  Position Performed: Seated in chair  Adaptive Equipment Used: Long handled shoe horn  Comments: Assisted pt with changing laces for elastic to aid in independence with doffing/donning shoes. Pt able to remove laces from one shoe.  Mod A to doff shoes with regular laces using shoe horn; Max A to shante with elastic shoe laces ASSESSMENT:  Pt remains somewhat self-limiting with learned helplessness as she frequently requests assistance for tasks she is capable of. Improved use of B hands with increased coordination given 1# wrist weights. Progression toward goals:  []          Improving appropriately and progressing toward goals  [x]          Improving slowly and progressing toward goals  []          Not making progress toward goals and plan of care will be adjusted     PLAN:  Patient continues to benefit from skilled intervention to address the above impairments. Continue treatment per established plan of care. Discharge Recommendations:  Home Health with 24/7 Assistance   Further Equipment Recommendations for Discharge:  3:1 commode, tub transfer bench      Activity Tolerance:  Fair; min rest breaks     Estimated LOS: 1/14/2020    Please refer to the flowsheet for vital signs taken during this treatment. After treatment:   [x]  Patient left in no apparent distress sitting up in chair   []  Patient left in no apparent distress in bed  []  Call bell left within reach  []  Nursing notified  []  Caregiver present  []  Bed alarm activated    COMMUNICATION/EDUCATION:   [x] Home safety education was provided and the patient/caregiver indicated understanding. [x] Patient/family have participated as able in goal setting and plan of care. [x] Patient/family agree to work toward stated goals and plan of care. [] Patient understands intent and goals of therapy, but is neutral about his/her participation. [] Patient is unable to participate in goal setting and plan of care.       SABINE Canales/JANNET

## 2020-01-02 NOTE — PROGRESS NOTES
Problem: Mobility Impaired (Adult and Pediatric)  Goal: *Therapy Goal (Edit Goal, Insert Text)  Description  Physical Therapy Goals: STG  Initiated 12/17/2019, re-assessed 12/30/19 and to be accomplished within 7 day(s) on 01/06/20:  1. Patient will move from supine to sit and sit to supine , scoot up and down and roll side to side in bed with moderate assistance . (mod A rolling; max A sup<> sit)   Update: pt will roll side to side in bed with min A, and move from supine to sit and sit to supine in bed with modA  2. Patient will transfer from bed to chair and chair to bed with maximal assistance with one person assist using the least restrictive device. (MET, mod A w/ slide board)   Update: pt will transfer bed <> w/c with min A and lateral transfer board  3. Patient will perform sit to stand with maximal assistance with one person assist using appropriate AD. (unable at this time, total assist for attempting at p-bar)  4. Patient will perform static sitting balance without UE support for at least 2 minutes, demonstrating appropriate upright and midline posture at supervision level for ease of preparation for transfers. (MET, up to 5 mins with SBA/close Sup)   Update: Pt will demonstrate dynamic sitting balance for up to 15 mins with supervision to assist with ADL's and transfers  5. Patient will perform wheelchair mobility moderate assist for 150 ft distance over even surfaces. (MET, SBA/Sup for up to 200 ft)    Physical Therapy Goals: LTG  Initiated 12/17/2019, revised 12/30/19 and to be accomplished within 28 day(s) on 01/14/2020:   1. Patient will move from supine to sit and sit to supine with min A, and roll side to side in bed with contact guard assist.   2.  Patient will transfer from bed to chair and chair to bed with contact guard assist using lateral transfer board. 3.  Patient will perform sit to stand with moderate assistance and least restrictive device.   4.  Patient will participate in gait assessment if and when appropriate. 5.  Patient will perform wheelchair mobility over even surfaces at supervision level for at least 150 ft, including negotiation of doorways. 6.  Patient will perform wheelchair mobility up/down ramp, uneven sidewalk min A level. Outcome: Progressing Towards Goal  PHYSICAL THERAPY TREATMENT    Patient: Yumiko Sinha (65 y.o. female)  Date: 2020  Diagnosis: Status post laminectomy with spinal fusion [Z98.1] Status post laminectomy with spinal fusion  Precautions:    Chart, physical therapy assessment, plan of care and goals were reviewed. Time In: 1100  Time Out: 1200  Patient Seen For: AM;Balance activities; Therapeutic exercise;Transfer training  Time In: 2:45   Time out: 3:15  PM-patient seen for transfer, strength, safety and balance training  Pain:  Pt pain was reported as  7 pre-treatment. (Pain in rectum with standing and seated weight shifting)  Pt pain was reported as 8 post-treatment. Intervention: Patient has been medicated per her reports    Patient identified with name and : yes    SUBJECTIVE:     AM-I have been going to the bathroom since eating my black eyed peas with smoked turkey necks. PM-I am moving my legs more today.  (Patient was educated on the need to actively use bilateral lower extremities throughout her sessions for improved overall strength and to improve weight bearing for standing attempts.)    OBJECTIVE DATA SUMMARY:   Objective:     GROSS ASSESSMENT Daily Assessment    AROM: Generally decreased, functional  Strength: Generally decreased, functional  Coordination: Grossly decreased, non-functional  Sensation: Impaired       BED/MAT MOBILITY Daily Assessment    Rolling Right : 4 (Minimal assistance)  Rolling Left : 4 (Minimal assistance)  Supine to Sit : 3 (Moderate assistance)  Sit to Supine : 3 (Moderate assistance)       TRANSFERS Daily Assessment    Transfer Type: Lateral with transfer board  Transfer Assistance : 3 (Moderate assistance )  Sit to Stand Assistance: Maximum assistance  Car Transfers: Not tested  Car Type: N/A       GAIT Daily Assessment    Amount of Assistance: 0 (Not tested)       STEPS or STAIRS Daily Assessment    Steps/Stairs Ambulated (#): (0)       BALANCE Daily Assessment    Sitting - Static: Fair (occasional)  Sitting - Dynamic: Fair (occasional)  Standing - Static: Poor       WHEELCHAIR MOBILITY Daily Assessment    Able to Propel (ft): (50 ft)  Functional Level: (5)  Curbs/Ramps Assist Required (FIM Score): 0 (Not tested)  Wheelchair Setup Assist Required : 2 (Maximal assistance)  Wheelchair Management: Manages left brake;Manages right brake           Neuro Re-Education:  PM-Sit to stand transfers from the edge of the mat with maximum assistance x 2 trials and with maximum verbal cues to stand upright, however patient had difficulty with upright standing for more than a few seconds before complaining that her rectum is hurting and she feels like she has to use the restroom. Continue to address static standing to improve transfers and to ease the use of the transfer board once patient is safely able to stand step with least restrictive AD. ASSESSMENT:  Patient is seen for am session to address deficits in strength, transfers , mobility, safety and balance. Patient is agreeable to session to address transfers using the transfer board from higher surfaces to challenge her mobility and recall. Patient required moderate assistance with set up of transfer board as she had the added complaint today of her rectum paining her. Transfers with transfer board from the wheelchair <> mat  with moderate assistance, verbal cues for lower extremity and hand placement as well as moving hips and feet appropriately for energy conservation and safety. Scoots from left to right and right to left with moderate assistance with foot and hand placement and with hip shifting for effective scooting to both the left and right.  Patient again continues to complain of having pain in her rectum with this task. PM session-patient transfers from the wheelchair to the high/low mat table at equal height with moderate assistance and verbal cues for hand placement on the board, foot placement during transfer and with bringing her shoulders forward for improved leverage to lift and slide to and from each surface. Sit to stand transfers x 2 with maximum assistance and patient unable to stand for more than a few seconds. Continue to address current deficts for improved overall functional independence. Progression toward goals:  []      Improving appropriately and progressing toward goals  [x]      Improving slowly and progressing toward goals  []      Not making progress toward goals and plan of care will be adjusted     PLAN:  Patient continues to benefit from skilled intervention to address the above impairments. Continue treatment per established plan of care. Discharge Recommendations:  Home health vs SNF  Further Equipment Recommendations for Discharge:  WC-18 inch, WC ramp, transfer board     Estimated LOS:  1/14/20    Activity Tolerance:   AM-Tolerates session fair  PM-tolerates session well, however more fatigued and c/o sore rectum with standing and weight bearing activities  Please refer to the flowsheet for vital signs taken during this treatment. After treatment:   Patient left with caregiver and she is going to enjoy TV in the dining area.       Jonn Mohr  1/2/2020

## 2020-01-02 NOTE — PROGRESS NOTES
SHIFT CHANGE NOTE FOR [facility]    Bedside and Verbal shift change report given to Dominic Pichardo (oncoming nurse) by Rafael Raman LPN (offgoing nurse). Report included the following information SBAR, Kardex, MAR and Recent Results.     Situation:   Code Status: Full Code   Reason for Admission: Spinal Thoracic laminectomy T 6-T [address] Day: 17   Problem List:   Hospital Problems  Date Reviewed: 12/29/2019          Codes Class Noted POA    Chronic respiratory failure with hypoxia (HCC) (Chronic) ICD-10-CM: J96.11  ICD-9-CM: 518.83, 799.02  Unknown Yes    Overview Signed 12/16/2019 10:11 PM by Laurel Willoughby MD     On home oxygen inhalation at 3 LPM via NC             Opioid dependence ([facility] (Chronic) ICD-10-CM: F11.20  ICD-9-CM: 304.00  Unknown Yes    Overview Signed 12/16/2019 10:54 PM by Laurel Willoughby MD     On Methadone             Acute blood loss as cause of postoperative anemia ICD-10-CM: D62  ICD-9-CM: 285.1  12/12/2019 Yes        Impaired mobility and ADLs ICD-10-CM: Z74.09  ICD-9-CM: 799.89  12/11/2019 Yes        Spinal cord compression ([facility] ICD-10-CM: G95.20  ICD-9-CM: 336.9  12/11/2019 Yes        Compression fracture of body of thoracic vertebra (HCC) ICD-10-CM: S22.000A  ICD-9-CM: 805.2  12/11/2019 Yes        * (Principal) Status post laminectomy with spinal fusion ICD-10-CM: Z98.1  ICD-9-CM: V45.4  12/11/2019 Yes    Overview Signed 12/16/2019 10:05 PM by Laurel Willoughby MD     S/P T10, T9, T8 laminectomy; T7, T8, T9, T10, T11, T12 posterior thoracic fusion; segmental spinal instrumentation; K2M [place] type, T7-T8, T11-T12 (12/11/2019 - Dr. Sarah Shaw)             History of fracture due to fall ICD-10-CM: Z87.81  ICD-9-CM: V15.51  12/11/2019 Yes        Hypoxemia requiring supplemental oxygen (Chronic) ICD-10-CM: R09.02, Z99.81  ICD-9-CM: 799.02  12/29/2014 Yes        Chronic obstructive pulmonary disease (COPD) (HCC) (Chronic) ICD-10-CM: J44.9  ICD-9-CM: 496  Unknown Yes    Overview Signed 4/23/2013 10:01 AM by Drew Payne     SOB, on paula O2  Recent admission with psychosis             Hypertensive heart disease without heart failure (Chronic) ICD-10-CM: I11.9  ICD-9-CM: 402.90  Unknown Yes    Overview Signed 4/23/2013 10:02 AM by Elvie CALLES     Better controlled             Type 2 diabetes mellitus with diabetic neuropathy, with long-term current use of insulin ([facility] [address] (Chronic) ICD-10-CM: E11.40, Z79.4  ICD-9-CM: 250.60, 357.2, V58.67  Unknown Yes    Overview Signed 12/18/2019  2:13 PM by Sebas Rosario MD     HbA1c (12/11/2019) = 7.1                   Background:   Past Medical History:   Past Medical History:   Diagnosis Date    Abnormally low high density lipoprotein (HDL) cholesterol with hypertriglyceridemia     Lipid profile (11/6/2016) showed , , HDL 38, LDL 37    Acute blood loss as cause of postoperative anemia 12/12/2019    Anxiety     Bipolar affective disorder ([facility] 12/5/2012    Cellulitis of right forearm 05/04/2017    Chronic back pain     Chronic obstructive pulmonary disease (COPD) (HCC)     SOB, on paula O2 Recent admission with psychosis     Chronic respiratory failure with hypoxia (HCC)     On home oxygen inhalation at 3 LPM via NC    Contusion of left elbow 5/4/2017    Depression     Diabetic neuropathy associated with type 2 diabetes mellitus (HCC)     Diastolic dysfunction without heart failure     Stable on diuretics     Falls frequently     Gastroesophageal reflux disease with hiatal hernia     Generalized osteoarthritis of multiple sites     Gout     On Allopurinol    History of acute renal failure 5/31/2013    History of back injury     jumped out of second story window     History of fracture due to fall 12/11/2019    History of hepatitis C     treated    History of penicillin allergy     History of vitamin D deficiency 5/10/2017    Hypertensive heart disease without heart failure     Better controlled     Hyperuricemia 5/26/2017    Hypothyroidism     Hypoxemia requiring supplemental oxygen 12/29/2014    Intravenous drug user 5/2/2017    Long term current use of aspirin     Memory difficulty     Menopause     Mixed connective tissue disease ([facility] 5/10/2017    Obesity, Class I, BMI 30-34.9     Olecranon bursitis of right elbow 5/4/2017    Opioid dependence ([facility]     On Methadone    Recurrent genital herpes 5/31/2013    Right Achilles tendinitis 5/2/2017    Sarcoidosis     Sickle cell trait ([facility]     Tobacco use disorder     Type 2 diabetes mellitus with diabetic neuropathy, with long-term current use of insulin ([facility])     HbA1c (12/11/2019) = 7.1    Urge urinary incontinence 5/10/2017    Venous insufficiency     Wears glasses       Patient taking anticoagulants no    Patient has a defibrillator: no     Assessment:   Changes in Assessment throughout shift: None     Patient has central line: no Reasons if yes: Removed 12/16/19  Insertion date:n/a Last dressing date:n/a   Patient has Renae Cath: no Reasons if yes: n/a   Insertion date:n/a     Last Vitals:     Vitals:    01/01/20 1544 01/01/20 2137 01/02/20 0746 01/02/20 1553   BP: 122/71 123/70 136/80 135/81   Pulse: 90 69 78 84   Resp: 18 18 18 18   Temp: 98.3 °F (36.8 °C) 98 °F (36.7 °C) 98.5 °F (36.9 °C) 97.2 °F (36.2 °C)   SpO2: 93% 99% 100% 99%   Weight:       LMP: 11/25/2012        PAIN    Pain Assessment    Pain Intensity 1: 5 (01/02/20 1600) Pain Intensity 1: 2 (12/29/14 1105)    Pain Location 1: Back Pain Location 1: Abdomen    Pain Intervention(s) 1: Medication (see MAR) Pain Intervention(s) 1: Medication (see MAR)  Patient Stated Pain Goal: 0 Patient Stated Pain Goal: 0  o Intervention effective: yes    o Other actions taken for pain: no c/o     Skin Assessment  Skin color Skin Color: Appropriate for ethnicity  Condition/Temperature Skin Condition/Temp: Warm  Integrity Skin Integrity: Lesion (comment)  Turgor Turgor: Non-tenting  Weekly Pressure Ulcer Documentation  Pressure  Injury Documentation: No Pressure Injury Noted-Pressure Ulcer Prevention Initiated  Wound Prevention & Protection Methods  Orientation of wound Orientation of Wound Prevention: Posterior  Location of Prevention Location of Wound Prevention: Buttocks, Sacrum/Coccyx  Dressing Present Dressing Present : No  Dressing Status Dressing Status: Intact  Wound Offloading Wound Offloading (Prevention Methods): Bed, pressure redistribution/air     INTAKE/OUPUT  Date 01/01/20 0700 - 01/02/20 0659 01/02/20 0700 - 01/03/20 0659   Shift 0700-1859 1900-0659 24 Hour Total 0700-1859 1900-0659 24 Hour Total   INTAKE   Shift Total(mL/kg)         OUTPUT   Urine(mL/kg/hr)           Urine Occurrence(s) 2 x 3 x 5 x 0 x  0 x   Stool           Stool Occurrence(s) 1 x 2 x 3 x 0 x  0 x   Shift Total(mL/kg)         NET         Weight (kg) 42.9 42.9 42.9 42.9 42.9 42.9       Recommendations:  1. Patient needs and requests: met    2. Diet: Cardiac DM Reg /thin liq    3. Pending tests/procedures: none     4. Functional Level/Equipment: wheelchair    5. Estimated Discharge Date: TBD Posted on Whiteboard in Patients Room: yes     HEALS Safety Check    A safety check occurred in the patient's room between off going nurse and oncoming nurse listed above.     The safety check included the below items  Area Items   H  High Alert Medications - Verify all high alert medication drips (heparin, PCA, etc.)   E  Equipment - Suction is set up for ALL patients (with yanker)  - Red plugs utilized for all equipment (IV pumps, etc.)  - WOWs wiped down at end of shift.  - Room stocked with oxygen, suction, and other unit-specific supplies   A  Alarms - Bed alarm is set for fall risk patients  - Ensure chair alarm is in place and activated if patient is up in a chair   L  Lines - Check IV for any infiltration  - Renae bag is empty if patient has a Renae   - Tubing and IV bags are labeled   S  Safety   - Room is clean, patient is clean, and equipment is clean. - Hallways are clear from equipment besides carts. - Fall bracelet on for fall risk patients  - Ensure room is clear and free of clutter  - Suction is set up for ALL patients (with lashay)  - Hallways are clear from equipment besides carts.    - Isolation precautions followed, supplies available outside room, sign posted

## 2020-01-02 NOTE — PROGRESS NOTES
NUTRITION    Nutrition Consult: General Nutrition Management & Supplements     RECOMMENDATIONS / PLAN:     - Add double portion of vegetables BID  - Monitor weight trends  - Continue RD inpatient monitoring and evaluation. NUTRITION INTERVENTIONS & DIAGNOSIS:     - Meals/snacks: modified composition  - Collaboration and referral of nutrition care: pt and weight trends discussed with nursing    Nutrition Diagnosis: No nutrition diagnosis at this time. ASSESSMENT:     1/2: Pt reported good appetite. Was consuming Glucerna Shake supplement because she is still hungry after meals sometimes; unsure of how pt obtained it. Explained to pt that current diet meets estimated energy needs. Pt still asking for additional portions; pt agreeable to receiving additional portion of vegetables BID. Noted significant change in weight since admission; questions accuracy of wt trends    1/1: Good meal intake and appetite. Per nursing multiple supplements remain in refrigerator for pt and pt agreeable to discontinue supplement order. Tolerating diet. Noted episode of hypoglycemia 12/31 afternoon, pt reports good meal intake yesterday. 12/26: Pt unavailable at time of visit. Has good/excellent meal intake per chart. Tolerating diet. Consuming 100% of ensure pudding per nursing documentation  12/18: Pt reported good appetite, fair meal intake due to poor dentition, missing teeth. Food preferences discussed. Agreed with plan to downgrade diet consistency. Not consuming Glucerna Shake; discussed other supplement options. 12/17: S/p laminectomy with spinal fusion on 12/11 and transferred to ARU. Reports fair intake, consuming around 50% of meals, during recent hospitalization. Noted significant weight loss however pt reports this was intentional. States she lost the weight \"to help control her diabetes. \" Fair intake since admission & tolerating diet. BG levels in good control.     Nutritional intake adequate to meet patients estimated nutritional needs:  Yes    Diet: DIET CARDIAC Soft Solids; Consistent Carb 1800kcal; No Conc. Sweets, Sharkey      Food Allergies: NKFA  Current Appetite: Good  Appetite/meal intake prior to admission: Fair 50% of meals provided  Feeding Limitations:  [] Swallowing difficulty    [] Chewing difficulty    [] Other:  Current Meal Intake: No data found. BM: 1/2  Skin Integrity: wound & incision to lower back  Edema:   [] No     [x] Yes   Pertinent Medications: Reviewed: vitamin C, bowel regimen, tums, calcium citrate, vitamin D3, lantus, SSI, protonix    No results for input(s): NA, K, CL, CO2, GLU, BUN, CREA, CA, MG, PHOS, ALB, PREALB, TBIL, SGOT, ALT in the last 72 hours. No intake or output data in the 24 hours ending 01/02/20 1629    Anthropometrics:  Ht Readings from Last 1 Encounters:   12/12/19 5' 5\" (1.651 m)     Last 3 Recorded Weights in this Encounter    12/21/19 1536   Weight: 42.9 kg (94 lb 8 oz)     Body mass index is 15.73 kg/m². BMI 29.68 kg/(m^2) using previous wt from 12/14/19    Weight History: Pt reports intended weight loss with a previous wt of 230# x a few years ago. Weight Metrics 12/21/2019 12/16/2019 12/14/2019 12/10/2019 12/7/2019 11/29/2019 11/12/2019   Weight 94 lb 8 oz - 178 lb 9.6 oz - 180 lb 180 lb 180 lb 6.4 oz   BMI - 15.73 kg/m2 - 29.72 kg/m2 29.95 kg/m2 29.95 kg/m2 30.02 kg/m2   Some encounter information is confidential and restricted. Go to Review Flowsheets activity to see all data.         Admitting Diagnosis: Status post laminectomy with spinal fusion [Z98.1]  Pertinent PMHx: COPD, CKD stage 3, DM with neuropathy, GERD, hepatitis C, IV drug user, sarcoidosis    Education Needs:        [x] None identified  [] Identified - Not appropriate at this time  []  Identified and addressed - refer to education log  Learning Limitations:   [x] None identified  [] Identified    Cultural, Nondenominational & ethnic food preferences:  [x] None identified    [] Identified and addressed     ESTIMATED NUTRITION NEEDS:     Calories: 9475-6579 kcal (MSJx1.2-1.3) based on  [x] Actual BW: 81 kg      [] IBW   Protein: 65-81 gm (0.8-1 gm/kg) based on  [x] Actual BW      [] IBW   Fluid: 1 mL/kcal     MONITORING & EVALUATION:     Nutrition Goal(s):   - PO nutrition intake will continue to meet >75% of patients estimated nutritional needs over the next 7 days. Outcome: Met/Continue    Monitoring:   [x] Food and nutrient intake   [x] Food and nutrient administration  [x] Comparative standards   [x] Nutrition-focused physical findings   [x] Anthropometric Measurements   [x] Treatment/therapy   [x] Biochemical data, medical tests, and procedures        Previous Recommendations (for follow-up assessments only): Implemented      Discharge Planning: Nutritional discharge needs unknown at this time. Participated in care planning, discharge planning, & interdisciplinary rounds as appropriate.       Jc Arce, RD  Pager: [phone]

## 2020-01-03 NOTE — PROGRESS NOTES
Pt gave consent to call Lottie Woodruff ([phone]) to schedule family education. Sw spoke with Lottie Woodruff who states that she has just started a new care case and is not able to leave work as freely at the moment. Lottie Woodruff states that she will talk to the pt and the pt's am aide to schedule a time next week for family education.

## 2020-01-03 NOTE — PROGRESS NOTES
SHIFT CHANGE NOTE FOR [facility]    Bedside and Verbal shift change report given to Dominic Pichardo (oncoming nurse) by Otilio Meade LPN (offgoing nurse). Report included the following information SBAR, Kardex, MAR and Recent Results.     Situation:   Code Status: Full Code   Reason for Admission: Spinal Thoracic laminectomy T 6-T [address] Day: 18   Problem List:   Hospital Problems  Date Reviewed: 12/29/2019          Codes Class Noted POA    Chronic respiratory failure with hypoxia (HCC) (Chronic) ICD-10-CM: J96.11  ICD-9-CM: 518.83, 799.02  Unknown Yes    Overview Signed 12/16/2019 10:11 PM by Venu Hummel MD     On home oxygen inhalation at 3 LPM via NC             Opioid dependence ([facility] [address] (Chronic) ICD-10-CM: F11.20  ICD-9-CM: 304.00  Unknown Yes    Overview Signed 12/16/2019 10:54 PM by Venu Hummel MD     On Methadone             Acute blood loss as cause of postoperative anemia ICD-10-CM: D62  ICD-9-CM: 285.1  12/12/2019 Yes        Impaired mobility and ADLs ICD-10-CM: Z74.09  ICD-9-CM: 799.89  12/11/2019 Yes        Spinal cord compression ([facility] ICD-10-CM: G95.20  ICD-9-CM: 336.9  12/11/2019 Yes        Compression fracture of body of thoracic vertebra ([facility] ICD-10-CM: S22.000A  ICD-9-CM: 805.2  12/11/2019 Yes        * (Principal) Status post laminectomy with spinal fusion ICD-10-CM: Z98.1  ICD-9-CM: V45.4  12/11/2019 Yes    Overview Signed 12/16/2019 10:05 PM by Venu Hummel MD     S/P T10, T9, T8 laminectomy; T7, T8, T9, T10, T11, T12 posterior thoracic fusion; segmental spinal instrumentation; K2M Davis type, T7-T8, T11-T12 (12/11/2019 - Dr. Lorena López)             History of fracture due to fall ICD-10-CM: Z87.81  ICD-9-CM: V15.51  12/11/2019 Yes        Hypoxemia requiring supplemental oxygen (Chronic) ICD-10-CM: R09.02, Z99.81  ICD-9-CM: 799.02  12/29/2014 Yes        Chronic obstructive pulmonary disease (COPD) (HCC) (Chronic) ICD-10-CM: J44.9  ICD-9-CM: 496  Unknown Yes    Overview Signed 4/23/2013 10:01 AM by Tala Escobar     SOB, on paula O2  Recent admission with psychosis             Hypertensive heart disease without heart failure (Chronic) ICD-10-CM: I11.9  ICD-9-CM: 402.90  Unknown Yes    Overview Signed 4/23/2013 10:02 AM by Sindi CALLES     Better controlled             Type 2 diabetes mellitus with diabetic neuropathy, with long-term current use of insulin ([facility] [address] (Chronic) ICD-10-CM: E11.40, Z79.4  ICD-9-CM: 250.60, 357.2, V58.67  Unknown Yes    Overview Signed 12/18/2019  2:13 PM by Yuval Lawrence MD     HbA1c (12/11/2019) = 7.1                   Background:   Past Medical History:   Past Medical History:   Diagnosis Date    Abnormally low high density lipoprotein (HDL) cholesterol with hypertriglyceridemia     Lipid profile (11/6/2016) showed , , HDL 38, LDL 37    Acute blood loss as cause of postoperative anemia 12/12/2019    Anxiety     Bipolar affective disorder ([facility] [address] 12/5/2012    Cellulitis of right forearm 05/04/2017    Chronic back pain     Chronic obstructive pulmonary disease (COPD) (HCC)     SOB, on paula O2 Recent admission with psychosis     Chronic respiratory failure with hypoxia (HCC)     On home oxygen inhalation at 3 LPM via NC    Contusion of left elbow 5/4/2017    Depression     Diabetic neuropathy associated with type 2 diabetes mellitus (HCC)     Diastolic dysfunction without heart failure     Stable on diuretics     Falls frequently     Gastroesophageal reflux disease with hiatal hernia     Generalized osteoarthritis of multiple sites     Gout     On Allopurinol    History of acute renal failure 5/31/2013    History of back injury     jumped out of second story window     History of fracture due to fall 12/11/2019    History of hepatitis C     treated    History of penicillin allergy     History of vitamin D deficiency 5/10/2017    Hypertensive heart disease without heart failure     Better controlled     Hyperuricemia 5/26/2017    Hypothyroidism     Hypoxemia requiring supplemental oxygen 12/29/2014    Intravenous drug user 5/2/2017    Long term current use of aspirin     Memory difficulty     Menopause     Mixed connective tissue disease ([facility] [address] 5/10/2017    Obesity, Class I, BMI 30-34.9     Olecranon bursitis of right elbow 5/4/2017    Opioid dependence ([facility]     On Methadone    Recurrent genital herpes 5/31/2013    Right Achilles tendinitis 5/2/2017    Sarcoidosis     Sickle cell trait ([facility]     Tobacco use disorder     Type 2 diabetes mellitus with diabetic neuropathy, with long-term current use of insulin ([facility])     HbA1c (12/11/2019) = 7.1    Urge urinary incontinence 5/10/2017    Venous insufficiency     Wears glasses       Patient taking anticoagulants no    Patient has a defibrillator: no     Assessment:   Changes in Assessment throughout shift: None     Patient has central line: no Reasons if yes: Removed 12/16/19  Insertion date:n/a Last dressing date:n/a   Patient has Renae Cath: no Reasons if yes: n/a   Insertion date:n/a     Last Vitals:     Vitals:    01/02/20 0746 01/02/20 1553 01/02/20 2107 01/03/20 0823   BP: 136/80 135/81 119/71 116/69   Pulse: 78 84 73 66   Resp: 18 18 18 18   Temp: 98.5 °F (36.9 °C) 97.2 °F (36.2 °C) 97.6 °F (36.4 °C) 98.4 °F (36.9 °C)   SpO2: 100% 99% 96% 95%   Weight:   78.2 kg (172 lb 6.4 oz)    LMP: 11/25/2012        PAIN    Pain Assessment    Pain Intensity 1: 6 (01/03/20 1600) Pain Intensity 1: 2 (12/29/14 1105)    Pain Location 1: Rectal Pain Location 1: Abdomen    Pain Intervention(s) 1: Medication (see MAR) Pain Intervention(s) 1: Medication (see MAR)  Patient Stated Pain Goal: 0 Patient Stated Pain Goal: 0  o Intervention effective: yes    o Other actions taken for pain: no c/o     Skin Assessment  Skin color Skin Color: Appropriate for ethnicity  Condition/Temperature Skin Condition/Temp: Warm  Integrity Skin Integrity: Incision (comment)  Turgor Turgor: Non-tenting  Weekly Pressure Ulcer Documentation  Pressure  Injury Documentation: No Pressure Injury Noted-Pressure Ulcer Prevention Initiated  Wound Prevention & Protection Methods  Orientation of wound Orientation of Wound Prevention: Posterior  Location of Prevention Location of Wound Prevention: Buttocks, Sacrum/Coccyx  Dressing Present Dressing Present : No  Dressing Status Dressing Status: Intact  Wound Offloading Wound Offloading (Prevention Methods): Bed, pressure redistribution/air, Chair cushion     INTAKE/OUPUT  Date 01/02/20 0700 - 01/03/20 0659 01/03/20 0700 - 01/04/20 0659   Shift 0700-1859 1900-0659 24 Hour Total 0700-1859 1900-0659 24 Hour Total   INTAKE   Shift Total(mL/kg)         OUTPUT   Urine(mL/kg/hr)           Urine Occurrence(s) 1 x 3 x 4 x 1 x  1 x   Stool           Stool Occurrence(s) 1 x 3 x 4 x 1 x  1 x   Shift Total(mL/kg)         NET         Weight (kg) 42.9 78.2 78.2 78.2 78.2 78.2       Recommendations:  1. Patient needs and requests: met    2. Diet: Cardiac DM Reg /thin liq    3. Pending tests/procedures: none     4. Functional Level/Equipment: wheelchair    5. Estimated Discharge Date: TBD Posted on Whiteboard in Patients Room: yes     HEALS Safety Check    A safety check occurred in the patient's room between off going nurse and oncoming nurse listed above.     The safety check included the below items  Area Items   H  High Alert Medications - Verify all high alert medication drips (heparin, PCA, etc.)   E  Equipment - Suction is set up for ALL patients (with yanker)  - Red plugs utilized for all equipment (IV pumps, etc.)  - WOWs wiped down at end of shift.  - Room stocked with oxygen, suction, and other unit-specific supplies   A  Alarms - Bed alarm is set for fall risk patients  - Ensure chair alarm is in place and activated if patient is up in a chair   L  Lines - Check IV for any infiltration  - Renae bag is empty if patient has a Renae   - Tubing and IV bags are labeled S  Safety   - Room is clean, patient is clean, and equipment is clean. - Hallways are clear from equipment besides carts. - Fall bracelet on for fall risk patients  - Ensure room is clear and free of clutter  - Suction is set up for ALL patients (with lashay)  - Hallways are clear from equipment besides carts.    - Isolation precautions followed, supplies available outside room, sign posted

## 2020-01-03 NOTE — PROGRESS NOTES
[facility] PHYSICAL REHABILITATION  [address] [place] [address], [address]     INPATIENT REHABILITATION  DAILY PROGRESS NOTE     Date: 1/3/2020    Name: Modesto Maldonado Age / Sex: 62 y.o. / female   CSN: [id number] MRN: [id number]   [facility] Date: 12/16/2019 Length of Stay: 18 days     Primary Rehab Diagnosis: Impaired Mobility and ADLs secondary to:  1. S/P T10, T9, T8 laminectomy; T7, T8, T9, T10, T11, T12 posterior thoracic fusion; segmental spinal instrumentation; K2M [place] type, T7-T8, T11-T12 (12/11/2019 - Dr. Sharath Gilbert)  2.  History of acute compression fractures of body of thoracic vertebrae (T9 and T10) due to fall      Subjective:     Patient sitting in a chair in NAD, awake, alert    Objective:     Vital Signs:  Patient Vitals for the past 24 hrs:   BP Temp Pulse Resp SpO2 Weight   01/03/20 0823 116/69 98.4 °F (36.9 °C) 66 18 95 %    01/02/20 2107 119/71 97.6 °F (36.4 °C) 73 18 96 % 78.2 kg (172 lb 6.4 oz)        Physical Examination:    General:  Awake, alert  Cardiovascular:  S1S2+, RRR  Pulmonary:  Coarse bs b/l  GI:  Soft, BS+, NT, ND  Extremities:  No edema          Current Medications:  Current Facility-Administered Medications   Medication Dose Route Frequency    busPIRone (BUSPAR) tablet 5 mg  5 mg Oral TID    pantoprazole (PROTONIX) tablet 40 mg  40 mg Oral ACB    calcium carbonate (TUMS) chewable tablet 200 mg [elemental]  200 mg Oral TID PRN    insulin glargine (LANTUS) injection 25 Units  25 Units SubCUTAneous ACB    insulin lispro (HUMALOG) injection 4 Units  4 Units SubCUTAneous TIDAC    lubiPROStone (AMITIZA) capsule 24 mcg  24 mcg Oral DAILY WITH BREAKFAST    guaiFENesin ER (MUCINEX) tablet 600 mg  600 mg Oral Q12H    acetaminophen (TYLENOL) tablet 650 mg  650 mg Oral Q4H PRN    bisacodyl (DULCOLAX) tablet 10 mg  10 mg Oral Q48H PRN    ferrous sulfate tablet 325 mg  1 Tab Oral DAILY WITH BREAKFAST    ascorbic acid (vitamin C) (VITAMIN C) tablet 250 mg  250 mg Oral DAILY WITH BREAKFAST    insulin lispro (HUMALOG) injection   SubCUTAneous TIDAC    senna-docusate (PERICOLACE) 8.6-50 mg per tablet 2 Tab  2 Tab Oral PCD    methadone (DOLOPHINE) tablet 20 mg  20 mg Oral DAILY    oxyCODONE-acetaminophen (PERCOCET 10)  mg per tablet 1 Tab  1 Tab Oral Q4H PRN    predniSONE (DELTASONE) tablet 30 mg  30 mg Oral DAILY WITH BREAKFAST    rosuvastatin (CRESTOR) tablet 5 mg  5 mg Oral QHS    albuterol (PROVENTIL VENTOLIN) nebulizer solution 2.5 mg  2.5 mg Nebulization Q4H PRN    levothyroxine (SYNTHROID) tablet 75 mcg  75 mcg Oral 6am    cholecalciferol (VITAMIN D3) (400 Units /10 mcg) tablet 5 Tab  2,000 Units Oral DAILY    calcium citrate tablet 200 mg [elemental]  200 mg Oral BID    divalproex DR (DEPAKOTE) tablet 500 mg  500 mg Oral QHS    fluticasone-vilanterol (BREO ELLIPTA) 100mcg-25mcg/puff  1 Puff Inhalation DAILY    oxybutynin (DITROPAN) tablet 5 mg  5 mg Oral BID    allopurinol (ZYLOPRIM) tablet 100 mg  100 mg Oral DAILY    sertraline (ZOLOFT) tablet 50 mg  50 mg Oral DAILY    methocarbamol (ROBAXIN) tablet 500 mg  500 mg Oral Q8H    aspirin chewable tablet 81 mg  81 mg Oral DAILY WITH BREAKFAST    docusate sodium (COLACE) capsule 100 mg  100 mg Oral DAILY AFTER BREAKFAST       Allergies:   Allergies   Allergen Reactions    Moxifloxacin Rash    Pcn [Penicillins] Swelling    Sulfa (Sulfonamide Antibiotics) Swelling       Functional Progress:    PHYSICAL THERAPY    ON ADMISSION MOST RECENT   Wheelchair Mobility/Management  Able to Propel (ft): 140 feet(max A for steering)  Functional Level: 2  Curbs/Ramps Assist Required (FIM Score): 0 (Not tested)  Wheelchair Setup Assist Required : 2 (Maximal assistance)  Wheelchair Management: Manages left brake(needing assist with brakes) Wheelchair Mobility/Management  Able to Propel (ft): 150 feet  Functional Level: 5(using B UE's)  Curbs/Ramps Assist Required (FIM Score): 0 (Not tested)  Wheelchair Setup Assist Required : 3 (Moderate assistance)  Wheelchair Management: Manages left brake, Manages right brake, Manages left armrest, Manages right armrest(needs assist for footrests)     Gait  Amount of Assistance: 0 (Not tested)  Distance (ft): 0 Feet (ft)  Assistive Device: (N/A) Gait  Amount of Assistance: 0 (Not tested)  Distance (ft): 0 Feet (ft)  Assistive Device: (N/A)     Balance-Sitting/Standing  Sitting - Static: Fair (occasional)  Sitting - Dynamic: Fair (occasional), Occassional, Poor (constant support)  Standing - Static: Poor, Constant support  Standing - Dynamic : (not tested) Balance-Sitting/Standing  Sitting - Static: Fair (occasional)  Sitting - Dynamic: Fair (occasional)  Standing - Static: Poor  Standing - Dynamic : Impaired     Bed/Mat Mobility  Rolling Right : 2 (Maximal assistance)  Rolling Left : 2 (Maximal assistance)  Supine to Sit : 2 (Maximal assistance)  Sit to Supine : 2 (Maximal assistance) Bed/Mat Mobility  Rolling Right : 4 (Minimal assistance)  Rolling Left : 4 (Minimal assistance)  Supine to Sit : 3 (Moderate assistance)  Sit to Supine : 3 (Moderate assistance)     Transfers  Transfer Type: Lateral with transfer board  Transfer Assistance : 1 (Total assistance)(Max A x 2 for safety, sequencing, appropriate ant trunk )  Sit to Stand Assistance:  Total assistance  Car Transfers: Not tested  Car Type: N/A Transfers  Transfer Type: Lateral with transfer board  Transfer Assistance : 3 (Moderate assistance )  Sit to Stand Assistance: Maximum assistance  Car Transfers: Not tested  Car Type: N/A     Steps or Stairs  Steps/Stairs Ambulated (#): 0  Level of Assist : 0 (Not tested)  Rail Use: (N/A) Steps or Stairs  Steps/Stairs Ambulated (#): (0)  Level of Assist : 0 (Not tested)  Rail Use: (N/A)         Lab/Data Review:  Recent Results (from the past 24 hour(s))   GLUCOSE, POC    Collection Time: 01/02/20  4:56 PM   Result Value Ref Range    Glucose (POC) 319 (H) 70 - 110 mg/dL   GLUCOSE, POC Collection Time: 01/02/20  9:17 PM   Result Value Ref Range    Glucose (POC) 205 (H) 70 - 110 mg/dL   GLUCOSE, POC    Collection Time: 01/03/20  8:41 AM   Result Value Ref Range    Glucose (POC) 93 70 - 110 mg/dL   GLUCOSE, POC    Collection Time: 01/03/20 12:35 PM   Result Value Ref Range    Glucose (POC) 150 (H) 70 - 110 mg/dL           Assessment:     Primary Rehabilitation Diagnosis  1. Impaired Mobility and ADLs  2. S/P T10, T9, T8 laminectomy; T7, T8, T9, T10, T11, T12 posterior thoracic fusion; segmental spinal instrumentation; K2M [place] type, T7-T8, T11-T12 (12/11/2019 - Dr. Ish Joshi)  3. History of acute compression fractures of body of thoracic vertebrae (T9 and T10) due to fall     Comorbidities   Diabetic neuropathy associated with type 2 diabetes mellitus (HCC) E11.40    Venous insufficiency I87.2    Obesity, Class I, BMI 30-34.9 E66.9    Chronic back pain M54.9, G89.29    Generalized osteoarthritis of multiple sites M15.9    Bipolar affective disorder  F31.9    Abnormally low high density lipoprotein (HDL) cholesterol with hypertriglyceridemia E78.6, R25.1    Diastolic dysfunction without heart failure I51.89    Chronic obstructive pulmonary disease (COPD)  J44.9    Hypertensive heart disease without heart failure I11.9    Depression F32.9    History of back injury Z87.828    Type 2 diabetes mellitus with diabetic neuropathy, with long-term current use of insulin  E11.40, Z79.4    Anxiety F41.9    Wears glasses Z97.3    History of hepatitis C Z86.19    Sickle cell trait D57.3    History of acute renal failure Z87.448    Hypothyroidism E03.9    Recurrent genital herpes A60.00    Sarcoidosis D86.9    Abscess of right arm L02.413    Hypoxemia requiring supplemental oxygen R09.02, Z99.81    Abnormal nuclear stress test R94.39    Cellulitis and abscess of hand L03.119, L02.519    Cellulitis and abscess of foot L03.119, L02.619    Cellulitis of arm, right L03. [address] bursitis of right elbow M70.21    Contusion of left elbow S50. 02XA    Intravenous drug user F19.90    Right Achilles tendinitis M76.61    History of penicillin allergy Z88.0    Falls frequently R29.6    Gastroesophageal reflux disease with hiatal hernia K21.9, K44.9    Memory difficulty R41.3    Mixed connective tissue disease  M35.1    Hyperuricemia E79.0    History of vitamin D deficiency Z86.39    Urge urinary incontinence N39.41    Acute blood loss as cause of postoperative anemia D62    History of fracture due to fall Z87.81    Chronic respiratory failure with hypoxia  J96.11    Cellulitis of right forearm L03. 113    Gout M10.9    Menopause Z78.0    Long term current use of aspirin Z79.82    Opioid dependence  F11.20    Tobacco use disorder F17.200        Plan:     1. Justification for continued stay: Good progression towards established rehabilitation goals. 2. Medical Issues being followed closely:    [x]  Fall and safety precautions     []  Wound Care     [x]  Bowel and Bladder Function     [x]  Fluid Electrolyte and Nutrition Balance     []  Pain Control      3. Issues that 24 hour rehabilitation nursing is following:    [x]  Fall and safety precautions     []  Wound Care     [x]  Bowel and Bladder Function     [x]  Fluid Electrolyte and Nutrition Balance     []  Pain Control      [x]  Assistance with and education on in-room safety with transfers to and from the bed, wheelchair, toilet and shower. 4. Acute rehabilitation plan of care:    [x]  Continue current care and rehab. [x]  Physical Therapy           [x]  Occupational Therapy           []  Speech Therapy     []  Hold Rehab until further notice     5. Medications:    [x]  MAR Reviewed     [x]  Continue Present Medications     6. DVT Prophylaxis:      []  Lovenox     []  Unfractionated Heparin     []  Coumadin     []  NOAC     [x]  ROBERT Stockings     []  Sequential Compression Device     []  None     7.  Orders:   > S/P T10, T9, T8 laminectomy; T7, T8, T9, T10, T11, T12 posterior thoracic fusion; segmental spinal instrumentation; K2M [place] type, T7-T8, T11-T12 (12/11/2019 - Dr. Aquiles Arvizu); History of acute compression fractures of body of thoracic vertebrae (T9 and T10) due to fall   Spinal precautions    > Acute Postoperative Blood Loss Anemia   > iron, vit C    > Benign hypertensive heart and kidney disease with stage 3 chronic kidney disease without congestive heart failure   >monitor BP    > Chronic obstructive pulmonary disease; Chronic respiratory failure with hypoxemia requiring supplemental oxygen (3 LPM)   O2, breoellipta    > Opioid dependence   > on methadone    > Type 2 diabetes mellitus with stage 3 chronic kidney disease   Ssi, lantus      > Constipation   >on bowel regimen    > Analgesia   > Continue:    > Acetaminophen 650 mg PO q 4 hr PRN for pain level less than 5/10    > Methocarbamol 500 mg PO q 8 hr    > Percocet 10/325 1 tab PO q 4 hr PRN for pain level greater than 4/10     > Diet:   > Specifications: Cardiac, diabetic consistent carb 1800 kcal, no concentrated sweets, low purine   > Solids (consistency): Regular    > Liquids (consistency):  Thin       D/w Patient  Check Labs in AM    Signed:        January 3, 2020

## 2020-01-03 NOTE — PROGRESS NOTES
Problem: Mobility Impaired (Adult and Pediatric)  Goal: *Therapy Goal (Edit Goal, Insert Text)  Description  Physical Therapy Goals: STG  Initiated 12/17/2019, re-assessed 12/30/19 and to be accomplished within 7 day(s) on 01/06/20:  1. Patient will move from supine to sit and sit to supine , scoot up and down and roll side to side in bed with moderate assistance . (mod A rolling; max A sup<> sit)   Update: pt will roll side to side in bed with min A, and move from supine to sit and sit to supine in bed with modA  2. Patient will transfer from bed to chair and chair to bed with maximal assistance with one person assist using the least restrictive device. (MET, mod A w/ slide board)   Update: pt will transfer bed <> w/c with min A and lateral transfer board  3. Patient will perform sit to stand with maximal assistance with one person assist using appropriate AD. (unable at this time, total assist for attempting at p-bar)  4. Patient will perform static sitting balance without UE support for at least 2 minutes, demonstrating appropriate upright and midline posture at supervision level for ease of preparation for transfers. (MET, up to 5 mins with SBA/close Sup)   Update: Pt will demonstrate dynamic sitting balance for up to 15 mins with supervision to assist with ADL's and transfers  5. Patient will perform wheelchair mobility moderate assist for 150 ft distance over even surfaces. (MET, SBA/Sup for up to 200 ft)    Physical Therapy Goals: LTG  Initiated 12/17/2019, revised 12/30/19 and to be accomplished within 28 day(s) on 01/14/2020:   1. Patient will move from supine to sit and sit to supine with min A, and roll side to side in bed with contact guard assist.   2.  Patient will transfer from bed to chair and chair to bed with contact guard assist using lateral transfer board. 3.  Patient will perform sit to stand with moderate assistance and least restrictive device.   4.  Patient will participate in gait assessment if and when appropriate. 5.  Patient will perform wheelchair mobility over even surfaces at supervision level for at least 150 ft, including negotiation of doorways. 6.  Patient will perform wheelchair mobility up/down ramp, uneven sidewalk min A level. Outcome: Progressing Towards Goal   PHYSICAL THERAPY TREATMENT    Patient: Ariel Sinha (65 y.o. female)  Date: 1/3/2020  Diagnosis: Status post laminectomy with spinal fusion [Z98.1] Status post laminectomy with spinal fusion  Precautions:    Chart, physical therapy assessment, plan of care and goals were reviewed. Time In: 1030  Time Out: 1200  Patient Seen For: Patient education; Therapeutic exercise;Transfer training; Wheelchair mobility  Pain:  Pt pain not reported pre-treatment. Pt pain not reported  post-treatment. Intervention: none indicated, pt reports she had pain meds prior to session, as scheuduled    Patient identified with name and : yes    SUBJECTIVE:     Pt c/o pain in her \"rectum\" again today, especially when standing in standing frame. Pt reported being \"wet, I need to change my clothes. \" at start of session. OBJECTIVE DATA SUMMARY:   Objective: pt sitting up in w/c in dining room upon arrival of PT for start of session. Pt participate in w/c mobility to propel to bedroom. Pt then participated in slideboard transfer training to get back in bed and bed mobility training for clothing management and tyrell-care/hygiene. Pt utilized slide board again to transfer back to w/c. Pt then participated in standing frame to promote wt bearing on LE's and upright posture while participating in an activity with B UE's and wt shifting L and R in standing frame.  Pt ended session with seated ANNETTE Townsend.     BED/MAT MOBILITY Daily Assessment    Rolling Right : 4 (Minimal assistance)  Rolling Left : 4 (Minimal assistance)  Supine to Sit : 3 (Moderate assistance)  Sit to Supine : 3 (Moderate assistance)       TRANSFERS Daily Assessment    Transfer Type: Lateral with transfer board  Transfer Assistance : 3 (Moderate assistance )         BALANCE Daily Assessment    Sitting - Static: Fair (occasional)  Sitting - Dynamic: Fair (occasional)  Standing - Static: Poor  Standing - Dynamic : Impaired       WHEELCHAIR MOBILITY Daily Assessment    Able to Propel (ft): 150 feet  Functional Level: 5(using B UE's)  Curbs/Ramps Assist Required (FIM Score): 0 (Not tested)  Wheelchair Setup Assist Required : 3 (Moderate assistance)  Wheelchair Management: Manages left brake;Manages right brake;Manages left armrest;Manages right armrest(needs assist for footrests)    Attempted using B LE's to help propel since pt now has shoes for her feet, however, B LE's tend to get caught up and drag under the chair and doesn't have enough strength in knee flexion to pull w/c along. THERAPEUTIC EXERCISES Daily Assessment    Extremity: Both  Exercise Type #1: Seated lower extremity strengthening(marches, LAQ's, ankle pumps)  Sets Performed: 2  Reps Performed: (5-10 reps)  Level of Assist: Maximum assistance(mod/max A depending on exercise)       Neuro Re-Education:  Pt participated in standing frame activity for 15 mins to promote good wt bearing on B LE's, upright posture, and wt shifting L <> R to help encourage activation of LE muscles to help with transfers and progress with sit to standing transfers. O2 sats >97%with 3 L of O2 during standing frame and HR in the low 100 range. ASSESSMENT:  Pt tolerated standing frame for 15 mins today to promote wt bearing, upright posture, and encourage LE muscle activation. Pt now has shoes to help decrease slipping of her feet during transfers. Pt continues to need mod A for scooting during lateral slideboard transfers. Pt able to line w/c up by bed, and flip arm rest of w/c out of the way, and improving wt shift to allow board to be positioned under her.   Pt will benefit from continued PT to progress with standing frame and eventually sit to stand transfers to help with toileting needs. Pt will benefit from training on w/c mobility outside also and ramps/slopes. Progression toward goals:  []      Improving appropriately and progressing toward goals  [x]      Improving slowly and progressing toward goals  []      Not making progress toward goals and plan of care will be adjusted     PLAN:  Patient continues to benefit from skilled intervention to address the above impairments. Continue treatment per established plan of care. Discharge Recommendations:  Home Health, with 24 hr assistance; vs SNF  Further Equipment Recommendations for Discharge:  Wheelchair- 18 inch with removable arms and removable elevating leg rests, w/c cushion, lateral transfer board, gait belt, w/c ramp     Estimated LOS: 01/14/2020    Activity Tolerance:   Fair +; pt without c/o SOB, nausea, or dizziness. Pt does have c/o pain in her back & rectal area, as well as fatigues and needs rest breaks. Please refer to the flowsheet for vital signs taken during this treatment. After treatment:   Patient left sitting up in w/c in her room with call light and bedside table in reach.        Letitia Briscoe, PT  1/3/2020

## 2020-01-03 NOTE — PROGRESS NOTES
SHIFT CHANGE NOTE FOR [facility]    Bedside and Verbal shift change report given to Leah Melissa LPN (oncoming nurse) by Yu Toscano RN (offgoing nurse). Report included the following information SBAR, Kardex, MAR and Recent Results.     Situation:   Code Status: Full Code   Reason for Admission: Spinal Thoracic laminectomy T 6-T 6510 flck.me Day: 18   Problem List:   Hospital Problems  Date Reviewed: 12/29/2019          Codes Class Noted POA    Chronic respiratory failure with hypoxia (HCC) (Chronic) ICD-10-CM: J96.11  ICD-9-CM: 518.83, 799.02  Unknown Yes    Overview Signed 12/16/2019 10:11 PM by David Carson MD     On home oxygen inhalation at 3 LPM via NC             Opioid dependence ([facility] [address] (Chronic) ICD-10-CM: F11.20  ICD-9-CM: 304.00  Unknown Yes    Overview Signed 12/16/2019 10:54 PM by David Carson MD     On Methadone             Acute blood loss as cause of postoperative anemia ICD-10-CM: D62  ICD-9-CM: 285.1  12/12/2019 Yes        Impaired mobility and ADLs ICD-10-CM: Z74.09  ICD-9-CM: 799.89  12/11/2019 Yes        Spinal cord compression ([facility] [address] ICD-10-CM: G95.20  ICD-9-CM: 336.9  12/11/2019 Yes        Compression fracture of body of thoracic vertebra ([facility] [address] ICD-10-CM: S22.000A  ICD-9-CM: 805.2  12/11/2019 Yes        * (Principal) Status post laminectomy with spinal fusion ICD-10-CM: Z98.1  ICD-9-CM: V45.4  12/11/2019 Yes    Overview Signed 12/16/2019 10:05 PM by David Carson MD     S/P T10, T9, T8 laminectomy; T7, T8, T9, T10, T11, T12 posterior thoracic fusion; segmental spinal instrumentation; K2M Burke type, T7-T8, T11-T12 (12/11/2019 - Dr. Steven Saba)             History of fracture due to fall ICD-10-CM: Z87.81  ICD-9-CM: V15.51  12/11/2019 Yes        Hypoxemia requiring supplemental oxygen (Chronic) ICD-10-CM: R09.02, Z99.81  ICD-9-CM: 799.02  12/29/2014 Yes        Chronic obstructive pulmonary disease (COPD) (HCC) (Chronic) ICD-10-CM: J44.9  ICD-9-CM: 496  Unknown Yes    Overview Signed 4/23/2013 10:01 AM by Janay Beckett     SOB, on paula O2  Recent admission with psychosis             Hypertensive heart disease without heart failure (Chronic) ICD-10-CM: I11.9  ICD-9-CM: 402.90  Unknown Yes    Overview Signed 4/23/2013 10:02 AM by Christa CALLES     Better controlled             Type 2 diabetes mellitus with diabetic neuropathy, with long-term current use of insulin ([facility] [address] (Chronic) ICD-10-CM: E11.40, Z79.4  ICD-9-CM: 250.60, 357.2, V58.67  Unknown Yes    Overview Signed 12/18/2019  2:13 PM by Griselda Spar, MD     HbA1c (12/11/2019) = 7.1                   Background:   Past Medical History:   Past Medical History:   Diagnosis Date    Abnormally low high density lipoprotein (HDL) cholesterol with hypertriglyceridemia     Lipid profile (11/6/2016) showed , , HDL 38, LDL 37    Acute blood loss as cause of postoperative anemia 12/12/2019    Anxiety     Bipolar affective disorder ([facility] [address] 12/5/2012    Cellulitis of right forearm 05/04/2017    Chronic back pain     Chronic obstructive pulmonary disease (COPD) (HCC)     SOB, on paula O2 Recent admission with psychosis     Chronic respiratory failure with hypoxia (HCC)     On home oxygen inhalation at 3 LPM via NC    Contusion of left elbow 5/4/2017    Depression     Diabetic neuropathy associated with type 2 diabetes mellitus (HCC)     Diastolic dysfunction without heart failure     Stable on diuretics     Falls frequently     Gastroesophageal reflux disease with hiatal hernia     Generalized osteoarthritis of multiple sites     Gout     On Allopurinol    History of acute renal failure 5/31/2013    History of back injury     jumped out of second story window     History of fracture due to fall 12/11/2019    History of hepatitis C     treated    History of penicillin allergy     History of vitamin D deficiency 5/10/2017    Hypertensive heart disease without heart failure     Better controlled     Hyperuricemia 5/26/2017    Hypothyroidism     Hypoxemia requiring supplemental oxygen 12/29/2014    Intravenous drug user 5/2/2017    Long term current use of aspirin     Memory difficulty     Menopause     Mixed connective tissue disease ([facility] [address] 5/10/2017    Obesity, Class I, BMI 30-34.9     Olecranon bursitis of right elbow 5/4/2017    Opioid dependence ([facility]     On Methadone    Recurrent genital herpes 5/31/2013    Right Achilles tendinitis 5/2/2017    Sarcoidosis     Sickle cell trait ([facility]     Tobacco use disorder     Type 2 diabetes mellitus with diabetic neuropathy, with long-term current use of insulin ([facility])     HbA1c (12/11/2019) = 7.1    Urge urinary incontinence 5/10/2017    Venous insufficiency     Wears glasses       Patient taking anticoagulants no    Patient has a defibrillator: no     Assessment:   Changes in Assessment throughout shift: None     Patient has central line: no Reasons if yes: Removed 12/16/19  Insertion date:n/a Last dressing date:n/a   Patient has Renae Cath: no Reasons if yes: n/a   Insertion date:n/a     Last Vitals:     Vitals:    01/01/20 2137 01/02/20 0746 01/02/20 1553 01/02/20 2107   BP: 123/70 136/80 135/81 119/71   Pulse: 69 78 84 73   Resp: 18 18 18 18   Temp: 98 °F (36.7 °C) 98.5 °F (36.9 °C) 97.2 °F (36.2 °C) 97.6 °F (36.4 °C)   SpO2: 99% 100% 99% 96%   Weight:    78.2 kg (172 lb 6.4 oz)   LMP: 11/25/2012        PAIN    Pain Assessment    Pain Intensity 1: 7 (01/03/20 0702) Pain Intensity 1: 2 (12/29/14 1105)    Pain Location 1: Rectal Pain Location 1: Abdomen    Pain Intervention(s) 1: Medication (see MAR) Pain Intervention(s) 1: Medication (see MAR)  Patient Stated Pain Goal: 0 Patient Stated Pain Goal: 0  o Intervention effective: yes    o Other actions taken for pain: no c/o     Skin Assessment  Skin color Skin Color: Appropriate for ethnicity  Condition/Temperature Skin Condition/Temp: Warm  Integrity Skin Integrity: Incision (comment)  Turgor Turgor: Non-tenting  Weekly Pressure Ulcer Documentation  Pressure  Injury Documentation: No Pressure Injury Noted-Pressure Ulcer Prevention Initiated  Wound Prevention & Protection Methods  Orientation of wound Orientation of Wound Prevention: Posterior  Location of Prevention Location of Wound Prevention: Buttocks, Sacrum/Coccyx  Dressing Present Dressing Present : No  Dressing Status Dressing Status: Intact  Wound Offloading Wound Offloading (Prevention Methods): Bed, pressure redistribution/air, Bed, pressure reduction mattress, Chair cushion, Elevate heels, Heel boots, Wheelchair     INTAKE/OUPUT  Date 01/02/20 0700 - 01/03/20 0659 01/03/20 0700 - 01/04/20 0659   Shift 0593-5765 5545-7268 24 Hour Total 0700-1859 1900-0659 24 Hour Total   INTAKE   Shift Total(mL/kg)         OUTPUT   Urine(mL/kg/hr)           Urine Occurrence(s) 1 x 3 x 4 x      Stool           Stool Occurrence(s) 1 x 3 x 4 x      Shift Total(mL/kg)         NET         Weight (kg) 42.9 78.2 78.2 78.2 78.2 78.2       Recommendations:  1. Patient needs and requests: met    2. Diet: Cardiac DM Reg /thin liq    3. Pending tests/procedures: none     4. Functional Level/Equipment: wheelchair    5. Estimated Discharge Date: TBD Posted on Whiteboard in Patients Room: yes     HEALS Safety Check    A safety check occurred in the patient's room between off going nurse and oncoming nurse listed above.     The safety check included the below items  Area Items   H  High Alert Medications - Verify all high alert medication drips (heparin, PCA, etc.)   E  Equipment - Suction is set up for ALL patients (with yanker)  - Red plugs utilized for all equipment (IV pumps, etc.)  - WOWs wiped down at end of shift.  - Room stocked with oxygen, suction, and other unit-specific supplies   A  Alarms - Bed alarm is set for fall risk patients  - Ensure chair alarm is in place and activated if patient is up in a chair   L  Lines - Check IV for any infiltration  - Renae bag is empty if patient has a Renae   - Tubing and IV bags are labeled   S  Safety   - Room is clean, patient is clean, and equipment is clean. - Hallways are clear from equipment besides carts. - Fall bracelet on for fall risk patients  - Ensure room is clear and free of clutter  - Suction is set up for ALL patients (with yanker)  - Hallways are clear from equipment besides carts.    - Isolation precautions followed, supplies available outside room, sign posted

## 2020-01-03 NOTE — PROGRESS NOTES
Problem: Self Care Deficits Care Plan (Adult)  Goal: *Therapy Goal (Edit Goal, Insert Text)  Description  Occupational Therapy Goals   Long Term Goals  Initiated 19 and to be accomplished within 4 week(s)  to facilitate increased self care independence and strength for return to PLOF and decreased care giver burden:   2. Pt will perform grooming with Mod I.   3. Pt will perform UB bathing with SBA. 4. Pt will perform LB bathing with Tyree. 5. Pt will perform tub/shower transfer <> TTB with Min A.   6. Pt will perform UB dressing with Set-up. 7. Pt will perform LB dressing with Min A.  8. Pt will perform toileting task with Min A.  9. Pt will perform toilet transfer with Min A/CGA. Short Term Goals   Initiated 19 and to be accomplished within 7 day(s) (2020) to facilitate increased self care independence and strength for return to PLOF and decreased care giver burden:   1. Pt will perform self-feeding with Mod I.   2. Pt will perform grooming with set-up. ()  3. Pt will perform UB bathing with Mod A. ()  4. Pt will perform LB bathing with Mod A in LRE. ()  5. Pt will perform tub/shower transfer <> TTB with MaxA x 1.-   6. Pt will perform UB dressing with SBA. -   7. Pt will perform LB dressing with Mod A using AE as needed. ()  8. Pt will perform toileting task with Max A x 1. ()  9. Pt will perform toilet transfer with MaxA x 1. Outcome Measures:  (19) Dynamometer  strength: Right= 27.33# (norm=55. 1#) Left= 34.67# (norm=45.7#)  (19) 9-hole peg test: Right=45.46 seconds ; Left=41.13 seconds        Outcome: Progressing Towards Goal   OCCUPATIONAL THERAPY TREATMENT    Patient: Jef Sinha   62 y.o.     Patient identified with name and : Yes     Date: 1/3/2020    First Tx Session  Time In: 1330  Time Out[de-identified] 1500    Diagnosis: Status post laminectomy with spinal fusion [Z98.1]   Precautions:    Chart, occupational therapy assessment, plan of care, and goals were reviewed. Pain:  Pt reports discomfort prior to treatment to back. Pt reports 0/10 pain or discomfort post treatment. Intervention Provided: RN provided muscle relaxer at start of tx       SUBJECTIVE:   Patient stated I'll meet you in [employer].   (start of session, pt with friend in room requesting to get herself together)  OBJECTIVE DATA SUMMARY:     Pt sitting up in dining room upon approach for session: pt requesting assistance to her room to drop off items/glucerna. Pt's friend present and requesting to spend time with pt in gym; education on importance of focus on therapy and allowing other pts to have privacy during therapy sessions. Pt taken to room to place items on tray table, then informing therapist she would meet in the [employer]" meaning gym. Pt's friend assisted her from room>gym. THERAPEUTIC ACTIVITY Daily Assessment   Endurance retraining with B UE functional reaching in prep for ADLs  Pt participated in [employer] table top game, tolerating unsupported sitting approx 15 mins prior to requesting to lean back. Pt able to scoot herself to edge of chair and back without assistance. Assist to manage B LEs forward/backward and off of leg rests. Pt able to shuffle cards/deal/and play holding cards in B hands with 1# wrist weights to assist with tremors. During game play, pt very verbose and telling other pt how to play despite education on importance of allowing other pt to focus on her game for cognitive retraining. THERAPEUTIC EXERCISE Daily Assessment   B UE therex to improve functional strength/ROM  Pt participated in B UE arm bike to increase B UE strength x20 mins. Pt took self-initiated rest breaks, often requiring cues to continue task. Pt actively participated in therex approx 15 mins.       MOBILITY/TRANSFERS Daily Assessment      Functional w/c mobility minimum distance into room using B UEs Supervision at beginning of session    Functional w/c mobility gym>room using B UEs with Supervision at end of session        ASSESSMENT:  Pt remains self-limiting with focus on other patients and therapist vs therapy session often requiring redirection. She requires cues to tend to task and perform completely due to self-limiting behaviors. Progression toward goals:  []          Improving appropriately and progressing toward goals  [x]          Improving slowly and progressing toward goals  []          Not making progress toward goals and plan of care will be adjusted     PLAN:  Patient continues to benefit from skilled intervention to address the above impairments. Continue treatment per established plan of care. Discharge Recommendations:  Home Health with 24/7 Assistance   Further Equipment Recommendations for Discharge:  3:1 drop arm commode     Activity Tolerance:  Fair; min rest breaks     Estimated LOS: 1/14/2020    Please refer to the flowsheet for vital signs taken during this treatment. After treatment:   [x]  Patient left in no apparent distress sitting up in chair   []  Patient left in no apparent distress in bed  [x]  Call bell left within reach  []  Nursing notified  []  Caregiver present  []  Bed alarm activated    COMMUNICATION/EDUCATION:   [x] Home safety education was provided and the patient/caregiver indicated understanding. [x] Patient/family have participated as able in goal setting and plan of care. [x] Patient/family agree to work toward stated goals and plan of care. [] Patient understands intent and goals of therapy, but is neutral about his/her participation. [] Patient is unable to participate in goal setting and plan of care.       SABINE Hunt/JANNET

## 2020-01-04 NOTE — ROUTINE PROCESS
Occupational Therapy TREATMENT    Patient: Silvio Sinha   62 y.o. Patient identified with name and : yes    Date: 2020    First Tx Session  Time In: 0  Time Out[de-identified] 900      Diagnosis: Status post laminectomy with spinal fusion [Z98.1]   Precautions:    Chart, occupational therapy assessment, plan of care, and goals were reviewed. Pain:  Pt reports 7/10 pain or discomfort prior to treatment. Pt reports 7/10 pain or discomfort post treatment. Intervention Provided: rest breaks      SUBJECTIVE:   Patient stated Chloe Reynolds you just put my shoes on for me-OT encouraged pt to use long shoehorn    OBJECTIVE DATA SUMMARY:       THERAPEUTIC EXERCISE Daily Assessment   UBE forward 5'  Reaching placing resistive pinch pins on clothespin tree (min A with AYDIN, supervision with PAWEL)  15' 5     IADL Daily Assessment   Pt washed clothes w/c level, reached and placed laundry into washer independently 7       LOWER BODY DRESSING Daily Assessment   Seated edge of bed: OT educated pt on use of long handled shoehorn and elastic shoelaces, pt returned demonstration with min assist and max encouragement donning L shoe  4     MOBILITY/TRANSFERS Daily Assessment   Min assist slideboard transfer bed-chair  4       ASSESSMENT: Pt was encouraged by her ability to don L shoe today with adaptive equipment with much needed encouragement. Progression toward goals:  []          Improving appropriately and progressing toward goals  []          Improving slowly and progressing toward goals  []          Not making progress toward goals and plan of care will be adjusted     PLAN:  Patient continues to benefit from skilled intervention to address the above impairments. Continue treatment per established plan of care.   Discharge Recommendations:  Home Health  Further Equipment Recommendations for Discharge:  N/A     Activity Tolerance:  fair    Estimated LOS:2 weeks    Please refer to the flowsheet for vital signs taken during this treatment. After treatment:   [x]  Patient left in no apparent distress sitting up in chair   []  Patient left in no apparent distress in bed  []  Call bell left within reach  []  Nursing notified  []  Caregiver present  []  Bed alarm activated    COMMUNICATION/EDUCATION:   [] Home safety education was provided and the patient/caregiver indicated understanding. [x] Patient/family have participated as able in goal setting and plan of care. [] Patient/family agree to work toward stated goals and plan of care. [] Patient understands intent and goals of therapy, but is neutral about his/her participation. [] Patient is unable to participate in goal setting and plan of care.       Pola Cabrera, OT

## 2020-01-04 NOTE — PROGRESS NOTES
SHIFT CHANGE NOTE FOR [facility]    Bedside and Verbal shift change report given to Brayden Dietrich LPN (oncoming nurse) by Emmett Nelson RN (offgoing nurse). Report included the following information SBAR, Kardex, MAR and Recent Results.     Situation:   Code Status: Full Code   Reason for Admission: Spinal Thoracic laminectomy T 6-T [address] Day: 19   Problem List:   Hospital Problems  Date Reviewed: 12/29/2019          Codes Class Noted POA    Chronic respiratory failure with hypoxia (HCC) (Chronic) ICD-10-CM: J96.11  ICD-9-CM: 518.83, 799.02  Unknown Yes    Overview Signed 12/16/2019 10:11 PM by Alfredo Pantoja MD     On home oxygen inhalation at 3 LPM via NC             Opioid dependence ([facility] (Chronic) ICD-10-CM: F11.20  ICD-9-CM: 304.00  Unknown Yes    Overview Signed 12/16/2019 10:54 PM by Alfredo Pantoja MD     On Methadone             Acute blood loss as cause of postoperative anemia ICD-10-CM: D62  ICD-9-CM: 285.1  12/12/2019 Yes        Impaired mobility and ADLs ICD-10-CM: Z74.09  ICD-9-CM: 799.89  12/11/2019 Yes        Spinal cord compression ([facility] ICD-10-CM: G95.20  ICD-9-CM: 336.9  12/11/2019 Yes        Compression fracture of body of thoracic vertebra (HCC) ICD-10-CM: S22.000A  ICD-9-CM: 805.2  12/11/2019 Yes        * (Principal) Status post laminectomy with spinal fusion ICD-10-CM: Z98.1  ICD-9-CM: V45.4  12/11/2019 Yes    Overview Signed 12/16/2019 10:05 PM by Alfredo Pantoja MD     S/P T10, T9, T8 laminectomy; T7, T8, T9, T10, T11, T12 posterior thoracic fusion; segmental spinal instrumentation; K2M Davis type, T7-T8, T11-T12 (12/11/2019 - Dr. Anna Jaquez)             History of fracture due to fall ICD-10-CM: Z87.81  ICD-9-CM: V15.51  12/11/2019 Yes        Hypoxemia requiring supplemental oxygen (Chronic) ICD-10-CM: R09.02, Z99.81  ICD-9-CM: 799.02  12/29/2014 Yes        Chronic obstructive pulmonary disease (COPD) (HCC) (Chronic) ICD-10-CM: J44.9  ICD-9-CM: 496  Unknown Yes    Overview Signed 4/23/2013 10:01 AM by [facility]     SOB, on paula O2  Recent admission with psychosis             Hypertensive heart disease without heart failure (Chronic) ICD-10-CM: I11.9  ICD-9-CM: 402.90  Unknown Yes    Overview Signed 4/23/2013 10:02 AM by Marjorie CALLES     Better controlled             Type 2 diabetes mellitus with diabetic neuropathy, with long-term current use of insulin ([facility] [address] (Chronic) ICD-10-CM: E11.40, Z79.4  ICD-9-CM: 250.60, 357.2, V58.67  Unknown Yes    Overview Signed 12/18/2019  2:13 PM by Redd Marx MD     HbA1c (12/11/2019) = 7.1                   Background:   Past Medical History:   Past Medical History:   Diagnosis Date    Abnormally low high density lipoprotein (HDL) cholesterol with hypertriglyceridemia     Lipid profile (11/6/2016) showed , , HDL 38, LDL 37    Acute blood loss as cause of postoperative anemia 12/12/2019    Anxiety     Bipolar affective disorder ([facility] [address] 12/5/2012    Cellulitis of right forearm 05/04/2017    Chronic back pain     Chronic obstructive pulmonary disease (COPD) (HCC)     SOB, on paula O2 Recent admission with psychosis     Chronic respiratory failure with hypoxia (HCC)     On home oxygen inhalation at 3 LPM via NC    Contusion of left elbow 5/4/2017    Depression     Diabetic neuropathy associated with type 2 diabetes mellitus (HCC)     Diastolic dysfunction without heart failure     Stable on diuretics     Falls frequently     Gastroesophageal reflux disease with hiatal hernia     Generalized osteoarthritis of multiple sites     Gout     On Allopurinol    History of acute renal failure 5/31/2013    History of back injury     jumped out of second story window     History of fracture due to fall 12/11/2019    History of hepatitis C     treated    History of penicillin allergy     History of vitamin D deficiency 5/10/2017    Hypertensive heart disease without heart failure     Better controlled     Hyperuricemia 5/26/2017    Hypothyroidism     Hypoxemia requiring supplemental oxygen 12/29/2014    Intravenous drug user 5/2/2017    Long term current use of aspirin     Memory difficulty     Menopause     Mixed connective tissue disease ([facility] [address] 5/10/2017    Obesity, Class I, BMI 30-34.9     Olecranon bursitis of right elbow 5/4/2017    Opioid dependence ([facility]     On Methadone    Recurrent genital herpes 5/31/2013    Right Achilles tendinitis 5/2/2017    Sarcoidosis     Sickle cell trait ([facility]     Tobacco use disorder     Type 2 diabetes mellitus with diabetic neuropathy, with long-term current use of insulin ([facility])     HbA1c (12/11/2019) = 7.1    Urge urinary incontinence 5/10/2017    Venous insufficiency     Wears glasses       Patient taking anticoagulants no    Patient has a defibrillator: no     Assessment:   Changes in Assessment throughout shift: None     Patient has central line: no Reasons if yes: Removed 12/16/19  Insertion date:n/a Last dressing date:n/a   Patient has Renae Cath: no Reasons if yes: n/a   Insertion date:n/a     Last Vitals:     Vitals:    01/02/20 2107 01/03/20 0823 01/03/20 1652 01/03/20 2102   BP: 119/71 116/69 114/69 128/71   Pulse: 73 66 93 84   Resp: 18 18 18 18   Temp: 97.6 °F (36.4 °C) 98.4 °F (36.9 °C) 98.4 °F (36.9 °C) 98 °F (36.7 °C)   SpO2: 96% 95% 95% 100%   Weight: 78.2 kg (172 lb 6.4 oz)      LMP: 11/25/2012        PAIN    Pain Assessment    Pain Intensity 1: 7 (01/04/20 0700) Pain Intensity 1: 2 (12/29/14 1105)    Pain Location 1: Buttocks Pain Location 1: Abdomen    Pain Intervention(s) 1: Medication (see MAR) Pain Intervention(s) 1: Medication (see MAR)  Patient Stated Pain Goal: 0 Patient Stated Pain Goal: 0  o Intervention effective: yes    o Other actions taken for pain: no c/o     Skin Assessment  Skin color Skin Color: Appropriate for ethnicity  Condition/Temperature Skin Condition/Temp: Dry, Warm  Integrity Skin Integrity: Incision (comment)  Turgor Turgor: Non-tenting  Weekly Pressure Ulcer Documentation  Pressure  Injury Documentation: No Pressure Injury Noted-Pressure Ulcer Prevention Initiated  Wound Prevention & Protection Methods  Orientation of wound Orientation of Wound Prevention: Posterior  Location of Prevention Location of Wound Prevention: Sacrum/Coccyx  Dressing Present Dressing Present : No  Dressing Status Dressing Status: Intact  Wound Offloading Wound Offloading (Prevention Methods): Bed, pressure redistribution/air, Repositioning     INTAKE/OUPUT  Date 01/03/20 0700 - 01/04/20 0659 01/04/20 0700 - 01/05/20 0659   Shift 0700-1859 1900-0659 24 Hour Total 0700-1859 1900-0659 24 Hour Total   INTAKE   Shift Total(mL/kg)         OUTPUT   Urine(mL/kg/hr)           Urine Occurrence(s) 2 x 4 x 6 x      Stool           Stool Occurrence(s) 1 x 2 x 3 x      Shift Total(mL/kg)         NET         Weight (kg) 78.2 78.2 78.2 78.2 78.2 78.2       Recommendations:  1. Patient needs and requests: met    2. Diet: Cardiac DM Reg /thin liq    3. Pending tests/procedures: none     4. Functional Level/Equipment: wheelchair    5. Estimated Discharge Date: TBD Posted on Whiteboard in Patients Room: yes     HEALS Safety Check    A safety check occurred in the patient's room between off going nurse and oncoming nurse listed above.     The safety check included the below items  Area Items   H  High Alert Medications - Verify all high alert medication drips (heparin, PCA, etc.)   E  Equipment - Suction is set up for ALL patients (with yanker)  - Red plugs utilized for all equipment (IV pumps, etc.)  - WOWs wiped down at end of shift.  - Room stocked with oxygen, suction, and other unit-specific supplies   A  Alarms - Bed alarm is set for fall risk patients  - Ensure chair alarm is in place and activated if patient is up in a chair   L  Lines - Check IV for any infiltration  - Renae bag is empty if patient has a Renae   - Tubing and IV bags are labeled   S  Safety   - Room is clean, patient is clean, and equipment is clean. - Hallways are clear from equipment besides carts. - Fall bracelet on for fall risk patients  - Ensure room is clear and free of clutter  - Suction is set up for ALL patients (with lashay)  - Hallways are clear from equipment besides carts.    - Isolation precautions followed, supplies available outside room, sign posted

## 2020-01-04 NOTE — PROGRESS NOTES
SHIFT CHANGE NOTE FOR [facility]    Bedside and Verbal shift change report given to Yobani Winslow RN (oncoming nurse) by Wes Bennett LPN (offgoing nurse). Report included the following information SBAR, Kardex, MAR and Recent Results.     Situation:   Code Status: Full Code   Reason for Admission: Spinal Thoracic laminectomy T 6-T [address] Day: 19   Problem List:   Hospital Problems  Date Reviewed: 12/29/2019          Codes Class Noted POA    Chronic respiratory failure with hypoxia (HCC) (Chronic) ICD-10-CM: J96.11  ICD-9-CM: 518.83, 799.02  Unknown Yes    Overview Signed 12/16/2019 10:11 PM by Milo Khan MD     On home oxygen inhalation at 3 LPM via NC             Opioid dependence ([facility] [address] (Chronic) ICD-10-CM: F11.20  ICD-9-CM: 304.00  Unknown Yes    Overview Signed 12/16/2019 10:54 PM by Milo Khan MD     On Methadone             Acute blood loss as cause of postoperative anemia ICD-10-CM: D62  ICD-9-CM: 285.1  12/12/2019 Yes        Impaired mobility and ADLs ICD-10-CM: Z74.09  ICD-9-CM: 799.89  12/11/2019 Yes        Spinal cord compression ([facility] [address] ICD-10-CM: G95.20  ICD-9-CM: 336.9  12/11/2019 Yes        Compression fracture of body of thoracic vertebra (HCC) ICD-10-CM: S22.000A  ICD-9-CM: 805.2  12/11/2019 Yes        * (Principal) Status post laminectomy with spinal fusion ICD-10-CM: Z98.1  ICD-9-CM: V45.4  12/11/2019 Yes    Overview Signed 12/16/2019 10:05 PM by Milo Khan MD     S/P T10, T9, T8 laminectomy; T7, T8, T9, T10, T11, T12 posterior thoracic fusion; segmental spinal instrumentation; K2M Davis type, T7-T8, T11-T12 (12/11/2019 - Dr. Harsha Amin)             History of fracture due to fall ICD-10-CM: Z87.81  ICD-9-CM: V15.51  12/11/2019 Yes        Hypoxemia requiring supplemental oxygen (Chronic) ICD-10-CM: R09.02, Z99.81  ICD-9-CM: 799.02  12/29/2014 Yes        Chronic obstructive pulmonary disease (COPD) (HCC) (Chronic) ICD-10-CM: J44.9  ICD-9-CM: 496  Unknown Yes    Overview Signed 4/23/2013 10:01 AM by Marissa Veras     SOB, on paula O2  Recent admission with psychosis             Hypertensive heart disease without heart failure (Chronic) ICD-10-CM: I11.9  ICD-9-CM: 402.90  Unknown Yes    Overview Signed 4/23/2013 10:02 AM by Tony CALLES     Better controlled             Type 2 diabetes mellitus with diabetic neuropathy, with long-term current use of insulin ([facility] [address] (Chronic) ICD-10-CM: E11.40, Z79.4  ICD-9-CM: 250.60, 357.2, V58.67  Unknown Yes    Overview Signed 12/18/2019  2:13 PM by Milo Khan MD     HbA1c (12/11/2019) = 7.1                   Background:   Past Medical History:   Past Medical History:   Diagnosis Date    Abnormally low high density lipoprotein (HDL) cholesterol with hypertriglyceridemia     Lipid profile (11/6/2016) showed , , HDL 38, LDL 37    Acute blood loss as cause of postoperative anemia 12/12/2019    Anxiety     Bipolar affective disorder ([facility] [address] 12/5/2012    Cellulitis of right forearm 05/04/2017    Chronic back pain     Chronic obstructive pulmonary disease (COPD) (HCC)     SOB, on paula O2 Recent admission with psychosis     Chronic respiratory failure with hypoxia (HCC)     On home oxygen inhalation at 3 LPM via NC    Contusion of left elbow 5/4/2017    Depression     Diabetic neuropathy associated with type 2 diabetes mellitus (HCC)     Diastolic dysfunction without heart failure     Stable on diuretics     Falls frequently     Gastroesophageal reflux disease with hiatal hernia     Generalized osteoarthritis of multiple sites     Gout     On Allopurinol    History of acute renal failure 5/31/2013    History of back injury     jumped out of second story window     History of fracture due to fall 12/11/2019    History of hepatitis C     treated    History of penicillin allergy     History of vitamin D deficiency 5/10/2017    Hypertensive heart disease without heart failure     Better controlled     Hyperuricemia 5/26/2017    Hypothyroidism     Hypoxemia requiring supplemental oxygen 12/29/2014    Intravenous drug user 5/2/2017    Long term current use of aspirin     Memory difficulty     Menopause     Mixed connective tissue disease ([facility] [address] 5/10/2017    Obesity, Class I, BMI 30-34.9     Olecranon bursitis of right elbow 5/4/2017    [facility]io[facility] dependence ([facility]     On Methadone    Recurrent genital herpes 5/31/2013    Right Achilles tendinitis 5/2/2017    Sarcoidosis     Sickle cell trait ([facility]     Tobacco use disorder     Type 2 diabetes mellitus with diabetic neuropathy, with long-term current use of insulin ([facility])     HbA1c (12/11/2019) = 7.1    Urge urinary incontinence 5/10/2017    Venous insufficiency     Wears glasses       Patient taking anticoagulants no    Patient has a defibrillator: no     Assessment:   Changes in Assessment throughout shift: None     Patient has central line: no Reasons if yes: Removed 12/16/19  Insertion date:n/a Last dressing date:n/a   Patient has Renae Cath: no Reasons if yes: n/a   Insertion date:n/a     Last Vitals:     Vitals:    01/03/20 1652 01/03/20 2102 01/04/20 0700 01/04/20 1656   BP: 114/69 128/71 107/64 122/66   Pulse: 93 84 76 79   Resp: 18 18 18 18   Temp: 98.4 °F (36.9 °C) 98 °F (36.7 °C) 98 °F (36.7 °C) 98.7 °F (37.1 °C)   SpO2: 95% 100% 99% 98%   Weight:       LMP: 11/25/2012        PAIN    Pain Assessment    Pain Intensity 1: 7 (01/04/20 0700) Pain Intensity 1: 2 (12/29/14 1105)    Pain Location 1: Buttocks Pain Location 1: Abdomen    Pain Intervention(s) 1: Medication (see MAR) Pain Intervention(s) 1: Medication (see MAR)  Patient Stated Pain Goal: 0 Patient Stated Pain Goal: 0  o Intervention effective: yes    o Other actions taken for pain: no c/o     Skin Assessment  Skin color Skin Color: Appropriate for ethnicity  Condition/Temperature Skin Condition/Temp: Dry, Warm  Integrity Skin Integrity: Incision (comment)  Turgor Turgor: Non-tenting  Weekly Pressure Ulcer Documentation  Pressure  Injury Documentation: No Pressure Injury Noted-Pressure Ulcer Prevention Initiated  Wound Prevention & Protection Methods  Orientation of wound Orientation of Wound Prevention: Posterior  Location of Prevention Location of Wound Prevention: Sacrum/Coccyx  Dressing Present Dressing Present : No  Dressing Status Dressing Status: Intact  Wound Offloading Wound Offloading (Prevention Methods): Bed, pressure redistribution/air, Repositioning     INTAKE/OUPUT  Date 01/03/20 0700 - 01/04/20 0659 01/04/20 0700 - 01/05/20 0659   Shift 0700-1859 1900-0659 24 Hour Total 0700-1859 1900-0659 24 Hour Total   INTAKE   Shift Total(mL/kg)         OUTPUT   Urine(mL/kg/hr)           Urine Occurrence(s) 2 x 4 x 6 x 1 x  1 x   Stool           Stool Occurrence(s) 1 x 2 x 3 x 1 x  1 x   Shift Total(mL/kg)         NET         Weight (kg) 78.2 78.2 78.2 78.2 78.2 78.2       Recommendations:  1. Patient needs and requests: met    2. Diet: Cardiac DM Reg /thin liq    3. Pending tests/procedures: none     4. Functional Level/Equipment: wheelchair    5. Estimated Discharge Date: TBD Posted on Whiteboard in Patients Room: yes     HEALS Safety Check    A safety check occurred in the patient's room between off going nurse and oncoming nurse listed above.     The safety check included the below items  Area Items   H  High Alert Medications - Verify all high alert medication drips (heparin, PCA, etc.)   E  Equipment - Suction is set up for ALL patients (with yanker)  - Red plugs utilized for all equipment (IV pumps, etc.)  - WOWs wiped down at end of shift.  - Room stocked with oxygen, suction, and other unit-specific supplies   A  Alarms - Bed alarm is set for fall risk patients  - Ensure chair alarm is in place and activated if patient is up in a chair   L  Lines - Check IV for any infiltration  - Renae bag is empty if patient has a Renae   - Tubing and IV bags are labeled   S  Safety   - Room is clean, patient is clean, and equipment is clean. - Hallways are clear from equipment besides carts. - Fall bracelet on for fall risk patients  - Ensure room is clear and free of clutter  - Suction is set up for ALL patients (with lashay)  - Hallways are clear from equipment besides carts.    - Isolation precautions followed, supplies available outside room, sign posted

## 2020-01-04 NOTE — PROGRESS NOTES
Progress Note    Patient: Airam Valadez MRN: [id number]  CSN: [id number]    YOB: 1955  Age: 62 y.o. Sex: female    DOA: 12/16/2019 LOS:  LOS: 19 days                    Subjective:     Primary Rehab Diagnosis: Impaired Mobility and ADLs secondary to:  1. S/P T10, T9, T8 laminectomy; T7, T8, T9, T10, T11, T12 posterior thoracic fusion; segmental spinal instrumentation; K2M Davis type, T7-T8, T11-T12 (12/11/2019 - Dr. Ish Joshi)  2. History of acute compression fractures of body of thoracic vertebrae (T9 and T10) due to fall    Pt seen and examined  Anxiety sx improved   Review of systems  General: No fevers or chills. on o2 By NC pt sitting on chair  Eating her dinner   Cardiovascular: No chest pain or pressure. No palpitations. Pulmonary: No shortness of breath, cough or wheeze h/o sarcoidosis . Gastrointestinal: No abdominal pain, nausea, vomiting or diarrhea. reflux sx improved  Genitourinary: No dysuria. Musculoskeletal: No joint or muscle pain, no back pain, no recent trauma. Neurologic: No headache, no seizure    Objective:     Physical Exam:  Visit Vitals  /66   Pulse 79   Temp 98.7 °F (37.1 °C)   Resp 18   Wt 78.2 kg (172 lb 6.4 oz)   SpO2 98%   Breastfeeding No   BMI 28.69 kg/m²        General:         Alert, cooperative, no acute distress    HEENT: NC, Atraumatic. Lungs: CTA Bilaterally. No Wheezing/Rhonchi/Rales. Heart:  Regular  rhythm,   Abdomen: Soft, Non distended, Non tender.    Extremities: No lower limb edema   Psych:   . Not agitated. feeling anxious   Neurologic:  CN 2-12 grossly intact, Alert and oriented X 3. At her base line Cranial nerves II-XII are grossly intact.  No gross sensory deficit.  Motor strength is 4-/5 on BUE, 2+/5 on the RLE (except for 1/5 on the right ankle), 2-/5 on the LLE (except for 2+/5 on the left knee). Intake and Output:  Current Shift:  No intake/output data recorded.   Last three shifts:  No intake/output data recorded. Labs: Results:       Chemistry Recent Labs     01/04/20  0650   *      K 3.9      CO2 33*   BUN 21*   CREA 0.91   CA 8.6   AGAP 5   BUCR 23*      CBC w/Diff Recent Labs     01/04/20  0650   WBC 11.3   RBC 3.92*   HGB 10.9*   HCT 35.5   *   GRANS 50   LYMPH 43   EOS 1      Cardiac Enzymes No results for input(s): CPK, CKND1, SANTOS in the last 72 hours. No lab exists for component: CKRMB, TROIP   Coagulation No results for input(s): PTP, INR, APTT, INREXT, INREXT in the last 72 hours. Lipid Panel Lab Results   Component Value Date/Time    Cholesterol, total 141 09/30/2019 12:00 AM    HDL Cholesterol 39 (L) 09/30/2019 12:00 AM    LDL, calculated 61 09/30/2019 12:00 AM    VLDL, calculated 41 (H) 09/30/2019 12:00 AM    Triglyceride 204 (H) 09/30/2019 12:00 AM    CHOL/HDL Ratio 3.5 11/06/2016 04:18 AM      BNP No results for input(s): BNPP in the last 72 hours. Liver Enzymes No results for input(s): TP, ALB, TBIL, AP, SGOT, GPT in the last 72 hours.     No lab exists for component: DBIL   Thyroid Studies Lab Results   Component Value Date/Time    TSH 0.74 12/11/2019 05:37 PM          Procedures/imaging: see electronic medical records for all procedures/Xrays and details which were not copied into this note but were reviewed prior to creation of Plan    Medications:   Current Facility-Administered Medications   Medication Dose Route Frequency    busPIRone (BUSPAR) tablet 5 mg  5 mg Oral TID    pantoprazole (PROTONIX) tablet 40 mg  40 mg Oral ACB    calcium carbonate (TUMS) chewable tablet 200 mg [elemental]  200 mg Oral TID PRN    insulin glargine (LANTUS) injection 25 Units  25 Units SubCUTAneous ACB    insulin lispro (HUMALOG) injection 4 Units  4 Units SubCUTAneous TIDAC    lubiPROStone (AMITIZA) capsule 24 mcg  24 mcg Oral DAILY WITH BREAKFAST    guaiFENesin ER (MUCINEX) tablet 600 mg  600 mg Oral Q12H    acetaminophen (TYLENOL) tablet 650 mg  650 mg Oral Q4H PRN    bisacodyl (DULCOLAX) tablet 10 mg  10 mg Oral Q48H PRN    ferrous sulfate tablet 325 mg  1 Tab Oral DAILY WITH BREAKFAST    ascorbic acid (vitamin C) (VITAMIN C) tablet 250 mg  250 mg Oral DAILY WITH BREAKFAST    insulin lispro (HUMALOG) injection   SubCUTAneous TIDAC    senna-docusate (PERICOLACE) 8.6-50 mg per tablet 2 Tab  2 Tab Oral PCD    methadone (DOLOPHINE) tablet 20 mg  20 mg Oral DAILY    oxyCODONE-acetaminophen (PERCOCET 10)  mg per tablet 1 Tab  1 Tab Oral Q4H PRN    predniSONE (DELTASONE) tablet 30 mg  30 mg Oral DAILY WITH BREAKFAST    rosuvastatin (CRESTOR) tablet 5 mg  5 mg Oral QHS    albuterol (PROVENTIL VENTOLIN) nebulizer solution 2.5 mg  2.5 mg Nebulization Q4H PRN    levothyroxine (SYNTHROID) tablet 75 mcg  75 mcg Oral 6am    cholecalciferol (VITAMIN D3) (400 Units /10 mcg) tablet 5 Tab  2,000 Units Oral DAILY    calcium citrate tablet 200 mg [elemental]  200 mg Oral BID    divalproex DR (DEPAKOTE) tablet 500 mg  500 mg Oral QHS    fluticasone-vilanterol (BREO ELLIPTA) 100mcg-25mcg/puff  1 Puff Inhalation DAILY    oxybutynin (DITROPAN) tablet 5 mg  5 mg Oral BID    allopurinol (ZYLOPRIM) tablet 100 mg  100 mg Oral DAILY    sertraline (ZOLOFT) tablet 50 mg  50 mg Oral DAILY    methocarbamol (ROBAXIN) tablet 500 mg  500 mg Oral Q8H    aspirin chewable tablet 81 mg  81 mg Oral DAILY WITH BREAKFAST    docusate sodium (COLACE) capsule 100 mg  100 mg Oral DAILY AFTER BREAKFAST       Assessment/Plan     Principal Problem:    Status post laminectomy with spinal fusion (12/11/2019)      Overview: S/P T10, T9, T8 laminectomy; T7, T8, T9, T10, T11, T12 posterior thoracic       fusion; segmental spinal instrumentation; K2M Davis type, T7-T8, T11-T12       (12/11/2019 - Dr. Hyacinth Medina)    Active Problems:    Chronic obstructive pulmonary disease (COPD) ([facility] [address] [facility] ()      Overview: SOB, on pa[place] O2      Recent admission with psychosis      Hypertensive heart disease without heart failure ()      Overview: Better controlled      Type 2 diabetes mellitus with diabetic neuropathy, with long-term current use of insulin (HCC) ()      Overview: HbA1c (12/11/2019) = 7.1      Hypoxemia requiring supplemental oxygen (12/29/2014)      Impaired mobility and ADLs (12/11/2019)      Spinal cord compression (HCC) (12/11/2019)      Compression fracture of body of thoracic vertebra (HCC) (12/11/2019)      Acute blood loss as cause of postoperative anemia (12/12/2019)      History of fracture due to fall (12/11/2019)      Chronic respiratory failure with hypoxia (HCC) ()      Overview: On home oxygen inhalation at 3 LPM via NC      Opioid dependence ([facility] [address] ()      Overview: On Methadone      Plan  S/P T10, T9, T8 laminectomy; T7, T8, T9, T10, T11, T12 posterior thoracic fusion; segmental spinal instrumentation; K2M Davis type, T7-T8, T11-T12 (12/11/2019 - Dr. Susan Samson);  History of acute compression fractures of body of thoracic vertebrae (T9 and T10) due to fall  Thoracic spinal precautions  Calcium citrate 200 mg PO BID   Cholecalciferol 2,000 units PO once daily    Acute Postoperative Blood Loss Anemia   FeSO4 325 mg PO once daily with breakfast   Ascorbic Acid 250 mg PO once daily with breakfast     Anxiety  Buspar 5 mg TID    GERD   Change Pepcid to Protonix 40 mg daily  Tums TID prn    Benign hypertensive heart and kidney disease with stage 3 chronic kidney disease without congestive heart failure  Metolazone was not given during the patient's hospital stay at [address] [place] [address]     Chronic obstructive pulmonary disease; Chronic respiratory failure with hypoxemia requiring supplemental oxygen (3 LPM)  Fluticasone-Vilanterol 100-25 1 puff inhaled once daily   O2 inhalation at 3 LPM via nasal cannula continuous    Opioid dependence  Continue Methadone 20 mg PO once daily     Type 2 diabetes mellitus with stage 3 chronic kidney disease  Insulin glargine 25 units PO once daily before breakfast   Insulin lispro 4 units SC TID AC    Insulin lispro sliding scale SC TID AC only       Alda Isaac MD  1/4/2020 6:08  PM

## 2020-01-05 NOTE — PROGRESS NOTES
Progress Note    Patient: Rosemary Lozano MRN: [id number]  CSN: [id number]    YOB: 1955  Age: 62 y.o. Sex: female    DOA: 12/16/2019 LOS:  LOS: 20 days                    Subjective:     Primary Rehab Diagnosis: Impaired Mobility and ADLs secondary to:  1. S/P T10, T9, T8 laminectomy; T7, T8, T9, T10, T11, T12 posterior thoracic fusion; segmental spinal instrumentation; K2M Davis type, T7-T8, T11-T12 (12/11/2019 - Dr. Aquiles Arvizu)  2. History of acute compression fractures of body of thoracic vertebrae (T9 and T10) due to fall    Pt seen and examined  A  Review of systems  General: No fevers or chills. on o2 By NC pt sitting on chair  Eating her dinner   Cardiovascular: No chest pain or pressure. No palpitations. Pulmonary: No shortness of breath, cough or wheeze h/o sarcoidosis . Gastrointestinal: No abdominal pain, nausea, vomiting or diarrhea. reflux sx improved  Genitourinary: No dysuria. Musculoskeletal: N no recent trauma. Neurologic: No headache, no seizure    Objective:     Physical Exam:  Visit Vitals  /73 (BP 1 Location: Left arm, BP Patient Position: Supine)   Pulse 73   Temp 98.3 °F (36.8 °C)   Resp 18   Wt 78.2 kg (172 lb 6.4 oz)   SpO2 94%   Breastfeeding No   BMI 28.69 kg/m²        General:         Alert,  no acute distress    HEENT: NC, Atraumatic. Lungs: CTA Bilaterally. No Wheezing/Rhonchi/Rales. Heart:  Regular  rhythm,   Abdomen: Soft, Non distended, Non tender.    Extremities: No lower limb edema   Psych:   . Not agitated. feeling anxious   Neurologic:  CN 2-12 grossly intact, Alert and oriented X 3. At her base line Cranial nerves II-XII are grossly intact.  No gross sensory deficit.  Motor strength is 4-/5 on BUE, 2+/5 on the RLE (except for 1/5 on the right ankle), 2-/5 on the LLE (except for 2+/5 on the left knee). Intake and Output:  Current Shift:  No intake/output data recorded. Last three shifts:  No intake/output data recorded.     Labs: Results:       Chemistry Recent Labs     01/04/20  0650   *      K 3.9      CO2 33*   BUN 21*   CREA 0.91   CA 8.6   AGAP 5   BUCR 23*      CBC w/Diff Recent Labs     01/04/20  0650   WBC 11.3   RBC 3.92*   HGB 10.9*   HCT 35.5   *   GRANS 50   LYMPH 43   EOS 1      Cardiac Enzymes No results for input(s): CPK, CKND1, SANTOS in the last 72 hours. No lab exists for component: CKRMB, TROIP   Coagulation No results for input(s): PTP, INR, APTT, INREXT, INREXT in the last 72 hours. Lipid Panel Lab Results   Component Value Date/Time    Cholesterol, total 141 09/30/2019 12:00 AM    HDL Cholesterol 39 (L) 09/30/2019 12:00 AM    LDL, calculated 61 09/30/2019 12:00 AM    VLDL, calculated 41 (H) 09/30/2019 12:00 AM    Triglyceride 204 (H) 09/30/2019 12:00 AM    CHOL/HDL Ratio 3.5 11/06/2016 04:18 AM      BNP No results for input(s): BNPP in the last 72 hours. Liver Enzymes No results for input(s): TP, ALB, TBIL, AP, SGOT, GPT in the last 72 hours.     No lab exists for component: DBIL   Thyroid Studies Lab Results   Component Value Date/Time    TSH 0.74 12/11/2019 05:37 PM          Procedures/imaging: see electronic medical records for all procedures/Xrays and details which were not copied into this note but were reviewed prior to creation of Plan    Medications:   Current Facility-Administered Medications   Medication Dose Route Frequency    busPIRone (BUSPAR) tablet 5 mg  5 mg Oral TID    pantoprazole (PROTONIX) tablet 40 mg  40 mg Oral ACB    calcium carbonate (TUMS) chewable tablet 200 mg [elemental]  200 mg Oral TID PRN    insulin glargine (LANTUS) injection 25 Units  25 Units SubCUTAneous ACB    insulin lispro (HUMALOG) injection 4 Units  4 Units SubCUTAneous TIDAC    lubiPROStone (AMITIZA) capsule 24 mcg  24 mcg Oral DAILY WITH BREAKFAST    guaiFENesin ER (MUCINEX) tablet 600 mg  600 mg Oral Q12H    acetaminophen (TYLENOL) tablet 650 mg  650 mg Oral Q4H PRN    bisacodyl (DULCOLAX) tablet 10 mg  10 mg Oral Q48H PRN    ferrous sulfate tablet 325 mg  1 Tab Oral DAILY WITH BREAKFAST    ascorbic acid (vitamin C) (VITAMIN C) tablet 250 mg  250 mg Oral DAILY WITH BREAKFAST    insulin lispro (HUMALOG) injection   SubCUTAneous TIDAC    senna-docusate (PERICOLACE) 8.6-50 mg per tablet 2 Tab  2 Tab Oral PCD    methadone (DOLOPHINE) tablet 20 mg  20 mg Oral DAILY    oxyCODONE-acetaminophen (PERCOCET 10)  mg per tablet 1 Tab  1 Tab Oral Q4H PRN    predniSONE (DELTASONE) tablet 30 mg  30 mg Oral DAILY WITH BREAKFAST    rosuvastatin (CRESTOR) tablet 5 mg  5 mg Oral QHS    albuterol (PROVENTIL VENTOLIN) nebulizer solution 2.5 mg  2.5 mg Nebulization Q4H PRN    levothyroxine (SYNTHROID) tablet 75 mcg  75 mcg Oral 6am    cholecalciferol (VITAMIN D3) (400 Units /10 mcg) tablet 5 Tab  2,000 Units Oral DAILY    calcium citrate tablet 200 mg [elemental]  200 mg Oral BID    divalproex DR (DEPAKOTE) tablet 500 mg  500 mg Oral QHS    fluticasone-vilanterol (BREO ELLIPTA) 100mcg-25mcg/puff  1 Puff Inhalation DAILY    oxybutynin (DITROPAN) tablet 5 mg  5 mg Oral BID    allopurinol (ZYLOPRIM) tablet 100 mg  100 mg Oral DAILY    sertraline (ZOLOFT) tablet 50 mg  50 mg Oral DAILY    methocarbamol (ROBAXIN) tablet 500 mg  500 mg Oral Q8H    aspirin chewable tablet 81 mg  81 mg Oral DAILY WITH BREAKFAST    docusate sodium (COLACE) capsule 100 mg  100 mg Oral DAILY AFTER BREAKFAST       Assessment/Plan     Principal Problem:    Status post laminectomy with spinal fusion (12/11/2019)      Overview: S/P T10, T9, T8 laminectomy; T7, T8, T9, T10, T11, T12 posterior thoracic       fusion; segmental spinal instrumentation; K2M Morton type, T7-T8, T11-T12       (12/11/2019 - Dr. Michelle Torres)    Active Problems:    Ch[facility] ()      Overview: SOB, on [place] O2      Recent admission with psychosis      Hypertensive heart disease without heart failure () Overview: Better controlled      Type 2 diabetes mellitus with diabetic neuropathy, with long-term current use of insulin (HCC) ()      Overview: HbA1c (12/11/2019) = 7.1      Hypoxemia requiring supplemental oxygen (12/29/2014)      Impaired mobility and ADLs (12/11/2019)      Spinal cord compression (HCC) (12/11/2019)      Compression fracture of body of thoracic vertebra (HCC) (12/11/2019)      Acute blood loss as cause of postoperative anemia (12/12/2019)      History of fracture due to fall (12/11/2019)      Chronic respiratory failure with hypoxia (HCC) ()      Overview: On home oxygen inhalation at 3 LPM via NC      Opioid dependence ([facility] [address] ()      Overview: On Methadone      Plan  S/P T10, T9, T8 laminectomy; T7, T8, T9, T10, T11, T12 posterior thoracic fusion; segmental spinal instrumentation; K2M Davis type, T7-T8, T11-T12 (12/11/2019 - Dr. Jazmin Arias);  History of acute compression fractures of body of thoracic vertebrae (T9 and T10) due to fall  Thoracic spinal precautions  Calcium citrate 200 mg PO BID   Cholecalciferol 2,000 units PO once daily    Acute Postoperative Blood Loss Anemia   FeSO4 325 mg PO once daily with breakfast   Ascorbic Acid 250 mg PO once daily with breakfast     Thrombocytopenia  Recheck cbc in AM    Anxiety  Buspar 5 mg TID    GERD   Change Pepcid to Protonix 40 mg daily  Tums TID prn    Benign hypertensive heart and kidney disease with stage 3 chronic kidney disease without congestive heart failure  Metolazone was not given during the patient's hospital stay at [facility]     Chronic obstructive pulmonary disease; Chronic respiratory failure with hypoxemia requiring supplemental oxygen (3 LPM)  Fluticasone-Vilanterol 100-25 1 puff inhaled once daily   O2 inhalation at 3 LPM via nasal cannula continuous    Opioid dependence  Continue Methadone 20 mg PO once daily     Type 2 diabetes mellitus with stage 3 chronic kidney disease  Insulin glargine 25 units PO once daily before breakfast   Insulin lispro 4 units SC TID AC    Insulin lispro sliding scale SC TID AC only       Ton Ortega MD  1/5/2020 5:06  PM

## 2020-01-05 NOTE — PROGRESS NOTES
SHIFT CHANGE NOTE FOR [facility]    Bedside and Verbal shift change report given to Torsten Gracia, RN (oncoming nurse) by Haylie Jenkins RN (offgoing nurse). Report included the following information SBAR, Kardex, MAR and Recent Results.     Situation:   Code Status: Full Code   Reason for Admission: Spinal Thoracic laminectomy T 6-T [address] Day: 20   Problem List:   Hospital Problems  Date Reviewed: 12/29/2019          Codes Class Noted POA    Chronic respiratory failure with hypoxia (HCC) (Chronic) ICD-10-CM: J96.11  ICD-9-CM: 518.83, 799.02  Unknown Yes    Overview Signed 12/16/2019 10:11 PM by David Carson MD     On home oxygen inhalation at 3 LPM via NC             Opioid dependence ([facility] [address] (Chronic) ICD-10-CM: F11.20  ICD-9-CM: 304.00  Unknown Yes    Overview Signed 12/16/2019 10:54 PM by David Carson MD     On Methadone             Acute blood loss as cause of postoperative anemia ICD-10-CM: D62  ICD-9-CM: 285.1  12/12/2019 Yes        Impaired mobility and ADLs ICD-10-CM: Z74.09  ICD-9-CM: 799.89  12/11/2019 Yes        Spinal cord compression ([facility] [address] ICD-10-CM: G95.20  ICD-9-CM: 336.9  12/11/2019 Yes        Compression fracture of body of thoracic vertebra (HCC) ICD-10-CM: S22.000A  ICD-9-CM: 805.2  12/11/2019 Yes        * (Principal) Status post laminectomy with spinal fusion ICD-10-CM: Z98.1  ICD-9-CM: V45.4  12/11/2019 Yes    Overview Signed 12/16/2019 10:05 PM by David Carson MD     S/P T10, T9, T8 laminectomy; T7, T8, T9, T10, T11, T12 posterior thoracic fusion; segmental spinal instrumentation; K2M Davis type, T7-T8, T11-T12 (12/11/2019 - Dr. Steven Saba)             History of fracture due to fall ICD-10-CM: Z87.81  ICD-9-CM: V15.51  12/11/2019 Yes        Hypoxemia requiring supplemental oxygen (Chronic) ICD-10-CM: R09.02, Z99.81  ICD-9-CM: 799.02  12/29/2014 Yes        Chronic obstructive pulmonary disease (COPD) (HCC) (Chronic) ICD-10-CM: J44.9  ICD-9-CM: 496  Unknown Yes    Overview Signed 4/23/2013 10:01 AM by [facility]     SOB, on paula O2  Recent admission with psychosis             Hypertensive heart disease without heart failure (Chronic) ICD-10-CM: I11.9  ICD-9-CM: 402.90  Unknown Yes    Overview Signed 4/23/2013 10:02 AM by Marjorie CALLES     Better controlled             Type 2 diabetes mellitus with diabetic neuropathy, with long-term current use of insulin ([facility] [address] (Chronic) ICD-10-CM: E11.40, Z79.4  ICD-9-CM: 250.60, 357.2, V58.67  Unknown Yes    Overview Signed 12/18/2019  2:13 PM by Redd Marx MD     HbA1c (12/11/2019) = 7.1                   Background:   Past Medical History:   Past Medical History:   Diagnosis Date    Abnormally low high density lipoprotein (HDL) cholesterol with hypertriglyceridemia     Lipid profile (11/6/2016) showed , , HDL 38, LDL 37    Acute blood loss as cause of postoperative anemia 12/12/2019    Anxiety     Bipolar affective disorder ([facility] [address] 12/5/2012    Cellulitis of right forearm 05/04/2017    Chronic back pain     Chronic obstructive pulmonary disease (COPD) (HCC)     SOB, on paula O2 Recent admission with psychosis     Chronic respiratory failure with hypoxia (HCC)     On home oxygen inhalation at 3 LPM via NC    Contusion of left elbow 5/4/2017    Depression     Diabetic neuropathy associated with type 2 diabetes mellitus (HCC)     Diastolic dysfunction without heart failure     Stable on diuretics     Falls frequently     Gastroesophageal reflux disease with hiatal hernia     Generalized osteoarthritis of multiple sites     Gout     On Allopurinol    History of acute renal failure 5/31/2013    History of back injury     jumped out of second story window     History of fracture due to fall 12/11/2019    History of hepatitis C     treated    History of penicillin allergy     History of vitamin D deficiency 5/10/2017    Hypertensive heart disease without heart failure     Better controlled     Hyperuricemia 5/26/2017    Hypothyroidism     Hypoxemia requiring supplemental oxygen 12/29/2014    Intravenous drug user 5/2/2017    Long term current use of aspirin     Memory difficulty     Menopause     Mixed connective tissue disease ([facility] [address] 5/10/2017    Obesity, Class I, BMI 30-34.9     Olecranon bursitis of right elbow 5/4/2017    Opioid dependence ([address]     On Methadone    Recurrent genital herpes 5/31/2013    Right Achilles tendinitis 5/2/2017    Sarcoidosis     Sickle cell trait ([facility]     Tobacco use disorder     Type 2 diabetes mellitus with diabetic neuropathy, with long-term current use of insulin ([facility])     HbA1c (12/11/2019) = 7.1    Urge urinary incontinence 5/10/2017    Venous insufficiency     Wears glasses       Patient taking anticoagulants no    Patient has a defibrillator: no     Assessment:   Changes in Assessment throughout shift: None     Patient has central line: no Reasons if yes: Removed 12/16/19  Insertion date:n/a Last dressing date:n/a   Patient has Renae Cath: no Reasons if yes: n/a   Insertion date:n/a     Last Vitals:     Vitals:    01/03/20 2102 01/04/20 0700 01/04/20 1656 01/04/20 2203   BP: 128/71 107/64 122/66 117/72   Pulse: 84 76 79 79   Resp: 18 18 18 18   Temp: 98 °F (36.7 °C) 98 °F (36.7 °C) 98.7 °F (37.1 °C) 98 °F (36.7 °C)   SpO2: 100% 99% 98% 96%   Weight:       LMP: 11/25/2012        PAIN    Pain Assessment    Pain Intensity 1: 0 (01/05/20 0409) Pain Intensity 1: 2 (12/29/14 1105)    Pain Location 1: Buttocks Pain Location 1: Abdomen    Pain Intervention(s) 1: Medication (see MAR) Pain Intervention(s) 1: Medication (see MAR)  Patient Stated Pain Goal: 0 Patient Stated Pain Goal: 0  o Intervention effective: yes    o Other actions taken for pain: no c/o     Skin Assessment  Skin color Skin Color: Appropriate for ethnicity  Condition/Temperature Skin Condition/Temp: Dry, Warm  Integrity Skin Integrity: Incision (comment)  Turgor Turgor: Non-tenting  Weekly Pressure Ulcer Documentation  Pressure  Injury Documentation: No Pressure Injury Noted-Pressure Ulcer Prevention Initiated  Wound Prevention & Protection Methods  Orientation of wound Orientation of Wound Prevention: Posterior  Location of Prevention Location of Wound Prevention: Sacrum/Coccyx  Dressing Present Dressing Present : No  Dressing Status Dressing Status: Intact  Wound Offloading Wound Offloading (Prevention Methods): Bed, pressure redistribution/air     INTAKE/OUPUT  Date 01/04/20 0700 - 01/05/20 0659 01/05/20 0700 - 01/06/20 0659   Shift 0700-1859 1900-0659 24 Hour Total 0700-1859 1900-0659 24 Hour Total   INTAKE   Shift Total(mL/kg)         OUTPUT   Urine(mL/kg/hr)           Urine Occurrence(s) 1 x 2 x 3 x      Stool           Stool Occurrence(s) 1 x 0 x 1 x      Shift Total(mL/kg)         NET         Weight (kg) 78.2 78.2 78.2 78.2 78.2 78.2       Recommendations:  1. Patient needs and requests: met    2. Diet: Cardiac DM Reg /thin liq    3. Pending tests/procedures: none     4. Functional Level/Equipment: wheelchair    5. Estimated Discharge Date: TBD Posted on Whiteboard in Patients Room: yes     HEALS Safety Check    A safety check occurred in the patient's room between off going nurse and oncoming nurse listed above.     The safety check included the below items  Area Items   H  High Alert Medications - Verify all high alert medication drips (heparin, PCA, etc.)   E  Equipment - Suction is set up for ALL patients (with yanker)  - Red plugs utilized for all equipment (IV pumps, etc.)  - WOWs wiped down at end of shift.  - Room stocked with oxygen, suction, and other unit-specific supplies   A  Alarms - Bed alarm is set for fall risk patients  - Ensure chair alarm is in place and activated if patient is up in a chair   L  Lines - Check IV for any infiltration  - Renae bag is empty if patient has a Renae   - Tubing and IV bags are labeled   S  Safety   - Room is clean, patient is clean, and equipment is clean. - Hallways are clear from equipment besides carts. - Fall bracelet on for fall risk patients  - Ensure room is clear and free of clutter  - Suction is set up for ALL patients (with lashay)  - Hallways are clear from equipment besides carts.    - Isolation precautions followed, supplies available outside room, sign posted

## 2020-01-06 NOTE — PROGRESS NOTES
Problem: Mobility Impaired (Adult and Pediatric)  Goal: *Therapy Goal (Edit Goal, Insert Text)  Description  Physical Therapy Goals: STG  Initiated 12/17/2019, re-assessed 01/06/2020 and to be accomplished within 7 day(s) on 01/13/20:  1. Patient will move from supine to sit and sit to supine , scoot up and down and roll side to side in bed with moderate assistance . (mod A rolling; max A sup<> sit)   Update: pt will roll side to side in bed with min A, and move from supine to sit and sit to supine in bed with modA (goal met)  2. Patient will transfer from bed to chair and chair to bed with maximal assistance with one person assist using the least restrictive device. (MET, mod A w/ slide board)   Update: pt will transfer bed <> w/c with min A and lateral transfer board (goal met with equal heights, mod A with incline with 24\" height mat table)  3. Patient will perform sit to stand with maximal assistance with one person assist using appropriate AD. (met with p-bar and elevated mat table, only able to stand 30 sec)  4. Patient will perform static sitting balance without UE support for at least 2 minutes, demonstrating appropriate upright and midline posture at supervision level for ease of preparation for transfers. (MET, up to 5 mins with SBA/close Sup)   Update: Pt will demonstrate dynamic sitting balance for up to 15 mins with supervision to assist with ADL's and transfers (goal met)  5. Patient will perform wheelchair mobility moderate assist for 150 ft distance over even surfaces. (MET, SBA/Sup for up to 200 ft)    Physical Therapy Goals: LTG  Initiated 12/17/2019, revised 01/06/2020 and to be accomplished within 28 day(s) on 01/14/2020:   1.   Patient will move from supine to sit and sit to supine with min A, and roll side to side in bed with contact guard assist.   2.  Patient will transfer from bed to chair and chair to bed with contact guard assist using lateral transfer board, equal heights; and min A with incline to higher surface (24\" height bed)  3. Patient will perform sit to stand with moderate assistance and least restrictive device. 4.  Patient will perform car transfer with lateral transfer board and min A.   5.  Patient will perform wheelchair mobility over even surfaces at mod independent level for at least 150 ft, including negotiation of doorways. 6.  Patient will perform wheelchair mobility up/down ramp, uneven sidewalk min A level. Outcome: Progressing Towards Goal   PHYSICAL THERAPY WEEKLY PROGRESS NOTE    Patient: Pastora Sinha (65 y.o. female)  Date: 2020  Diagnosis: Status post laminectomy with spinal fusion [Z98.1]   Status post laminectomy with spinal fusion  Precautions:  falls, thoracic spinal precautions, O2 use  Chart, physical therapy assessment, plan of care and goals were reviewed. Time In: 1030  Time Out: 1200  Patient Seen For: Patient education;Transfer training; Wheelchair mobility(bed mobility training)    Pain:  Pt pain was reported as  7/10 in low back pre-treatment. Pt pain was reported as 8/10 in low back post-treatment. Intervention: pt received pain meds as scheduled prior to PT session, repositioning, rest    Patient identified with name and : yes    SUBJECTIVE:     Pt reported \"I have a hemorrhoid and it's causing my rectum to hurt. \" ;  \"I can't put a ramp on my steps at my apartment, my landlord can't do that. I have people that will pull me up the steps in the wheelchair. \"     OBJECTIVE DATA SUMMARY:   AROM: grossly decreased,non-functional B LE's due to ongoing weakness; PROM is WFL's B LE's. FIM SCORES Initial Assessment Weekly Progress Assessment 2020   Bed/Chair/Wheelchair Transfers 1 3  (with lateral transfer board)   Wheelchair Mobility 2 5   Walking [place] 0 1   Steps/Stairs 0 1   Please see [facility] Interdisciplinary Eval: Coordination/Balance Section for details regarding FIM score description.     BED/CHAIR/WHEELCHAIR TRANSFERS Initial Assessment Weekly Progress Assessment 1/6/2020   Rolling Right 2 (Maximal assistance) 4 (Minimal assistance)   Rolling Left 2 (Maximal assistance) 4 (Minimal assistance)   Supine to Sit 2 (Maximal assistance) 3 (Moderate assistance)(for LE management)   Sit to Stand Total assistance Maximum assistance(to parallel bar, with B feet blocked, from 24\" height mat )   Sit to Supine 2 (Maximal assistance) 3 (Moderate assistance)(for LE management)   Transfer Type Lateral with transfer board Lateral with transfer board   Comments using transfer board needing max A x 2 for safety, cues for sequencing and appropriate scooting technique  Pt is able to perform slide board transfers to/from equal height surfaces with min A; but needs mod A with incline to higher level surface height (pt reports her lower bed at home is still about 24\" off the floor. Pt is to have a family member measure it to be sure we practice correct height. Pt is able to line w/c up with bed, lock brakes, and move arm rest out of the way; but needs help to set-up board under her hip. Car Transfer Not tested Not tested   Car Type N/A n/a     GROSS ASSESSMENT Weekly Progress Assessment 1/6/2020   AROM Grossly decreased, non-functional(B LE's)   Strength Grossly decreased, non-functional(B LE's)   Coordination Grossly decreased, non-functional(B LE's)   Tone Normal   Sensation     PROM Within functional limits(B LE's)       WHEELCHAIR MOBILITY/MANAGEMENT Initial Assessment Weekly Progress Assessment 1/6/2020   Able to Propel 140 feet(max A for steering) 200 feet (level surfaces)  50 ft (outside on uneven surfaces)   Curbs/ramps assistance required 0 (Not tested) 3 (Moderate assistance)  For safety/control on inclines/slopes.  Curb NT   Wheelchair set up assistance required 2 (Maximal assistance)  4 (minimal assistance)   Wheelchair management Manages left brake(needing assist with brakes) Manages left brake;Manages right brake;Manages left armrest;Manages right armrest(needs assist for footrests)     WALKING INDEPENDENCE Initial Assessment Weekly Progress Assessment 1/6/2020   Assistive device (N/A)     Ambulation assistance - level surface    1 (Total assist)   Distance 0 Feet (ft)     Comments not tested at this time due to safety concern Unable to stand/ambulate at this time due to ongoing weakness in B LE's, spinal precautions, and anxiety issues. STEPS/STAIRS Initial Assessment Weekly Progress Assessment 1/6/2020   Steps/Stairs ambulated 0  0   Rail Use (N/A)     Comments unable to assess at this time due to safety/weakness  Unable at this time due to ongoing B LE weakness, thoracic spinal precautions, and pain issues. Curbs/Ramps (not yet able to assess)  NT           ASSESSMENT:  Pt progressing slowly, but steadily toward goals, from a w/c level. Pt met all short term goals and long term goals have been updated accordingly. Pt currently still functioning from a w/c level, as standing is still difficult due to ongoing weakness in B LE's, thoracic spinal precautions, and ongoing c/o pain in rectum with standing attempts. Pt has participated in standing frame for 10-15 mins x 2-3 trials over the past week to encourage wt bearing and promote activation of LE muscles. Pt participated in w/c mobility outside on uneven surfaces and slight slopes today during PT session. Pt reports she thinks her \"lower bed at home\" is approx 24\" high off floor. Practiced this height today for slideboard transfers and asked pt to have a family member measure it to make sure. Pt also participated in bed <> drop arm bedside commode transfer today with lateral transfer board during session. Pt will benefit from continued PT to work on slideboard transfers to a 24\" height bed with incline between bed and w/c, car transfer with slideboard, and discharge planning/family training.   Progression toward goals:  []      Improving appropriately and progressing toward goals  [x]      Improving slowly and progressing toward goals  []      Not making progress toward goals and plan of care will be adjusted     PLAN:  Patient continues to benefit from skilled intervention to address the above impairments. Continue treatment per established plan of care. Discharge Recommendations:  Home Health with 24 hr care, vs SNF  Further Equipment Recommendations for Discharge:  gait belt, lateral transfer board, and wheelchair 18 inch with removable arm rests, elevating leg rests, and w/c cushion; w/c ramp     Estimated LOS: 01/14/2020    Activity Tolerance:   Fair, pt with c/o increased pain and needs frequent rest breaks. No c/o dizziness, SOB, or nausea  Please refer to the flowsheet for vital signs taken during this treatment.   After treatment:   [] Patient left in no apparent distress in bed  [x] Patient left in no apparent distress sitting up in chair  [] Patient left in no apparent distress in w/c mobilizing under own power  [] Patient left in no apparent distress dining area  [] Patient left in no apparent distress mobilizing under own power  [x] Call bell left within reach  [] Nursing notified  [x] Visitor present in room  [] Bed alarm activated       César Kingsley, PT  1/6/2020

## 2020-01-06 NOTE — PROGRESS NOTES
SHIFT CHANGE NOTE FOR [facility]    Bedside and Verbal shift change report given to Keith Deshpande RN (oncoming nurse) by Sasha Vega RN (offgoing nurse). Report included the following information SBAR, Kardex, MAR and Recent Results.     Situation:   Code Status: Full Code   Reason for Admission: Spinal Thoracic laminectomy T 6-T [address] Day: 21   Problem List:   Hospital Problems  Date Reviewed: 1/6/2020          Codes Class Noted POA    Chronic respiratory failure with hypoxia (HCC) (Chronic) ICD-10-CM: J96.11  ICD-9-CM: 518.83, 799.02  Unknown Yes    Overview Signed 12/16/2019 10:11 PM by Sasha Nunez MD     On home oxygen inhalation at 3 LPM via NC             Opioid dependence ([facility] [address] (Chronic) ICD-10-CM: F11.20  ICD-9-CM: 304.00  Unknown Yes    Overview Signed 12/16/2019 10:54 PM by Sasha Nunez MD     On Methadone             Acute blood loss as cause of postoperative anemia ICD-10-CM: D62  ICD-9-CM: 285.1  12/12/2019 Yes        Impaired mobility and ADLs ICD-10-CM: Z74.09  ICD-9-CM: 799.89  12/11/2019 Yes        Spinal cord compression ([facility] [address] ICD-10-CM: G95.20  ICD-9-CM: 336.9  12/11/2019 Yes        Compression fracture of body of thoracic vertebra (HCC) ICD-10-CM: S22.000A  ICD-9-CM: 805.2  12/11/2019 Yes        * (Principal) Status post laminectomy with spinal fusion ICD-10-CM: Z98.1  ICD-9-CM: V45.4  12/11/2019 Yes    Overview Signed 12/16/2019 10:05 PM by Sasha Nunez MD     S/P T10, T9, T8 laminectomy; T7, T8, T9, T10, T11, T12 posterior thoracic fusion; segmental spinal instrumentation; K2M [place] type, T7-T8, T11-T12 (12/11/2019 - Dr. Kassandra Banerjee)             History of fracture due to fall ICD-10-CM: Z87.81  ICD-9-CM: V15.51  12/11/2019 Yes        Hypoxemia requiring supplemental oxygen (Chronic) ICD-10-CM: R09.02, Z99.81  ICD-9-CM: 799.02  12/29/2014 Yes        Chronic obstructive pulmonary disease (COPD) (HCC) (Chronic) ICD-10-CM: J44.9  ICD-9-CM: 496  Unknown Yes    Overview Signed 4/23/2013 10:01 AM by [facility]     SOB, on paula O2  Recent admission with psychosis             Hypertensive heart disease without heart failure (Chronic) ICD-10-CM: I11.9  ICD-9-CM: 402.90  Unknown Yes    Overview Signed 4/23/2013 10:02 AM by Marjorie CALLES     Better controlled             Type 2 diabetes mellitus with diabetic neuropathy, with long-term current use of insulin ([facility] [address] (Chronic) ICD-10-CM: E11.40, Z79.4  ICD-9-CM: 250.60, 357.2, V58.67  Unknown Yes    Overview Signed 12/18/2019  2:13 PM by Redd Marx MD     HbA1c (12/11/2019) = 7.1                   Background:   Past Medical History:   Past Medical History:   Diagnosis Date    Abnormally low high density lipoprotein (HDL) cholesterol with hypertriglyceridemia     Lipid profile (11/6/2016) showed , , HDL 38, LDL 37    Acute blood loss as cause of postoperative anemia 12/12/2019    Anxiety     Bipolar affective disorder ([facility] [address] 12/5/2012    Cellulitis of right forearm 05/04/2017    Chronic back pain     Chronic obstructive pulmonary disease (COPD) (HCC)     SOB, on paula O2 Recent admission with psychosis     Chronic respiratory failure with hypoxia (HCC)     On home oxygen inhalation at 3 LPM via NC    Contusion of left elbow 5/4/2017    Depression     Diabetic neuropathy associated with type 2 diabetes mellitus (HCC)     Diastolic dysfunction without heart failure     Stable on diuretics     Falls frequently     Gastroesophageal reflux disease with hiatal hernia     Generalized osteoarthritis of multiple sites     Gout     On Allopurinol    History of acute renal failure 5/31/2013    History of back injury     jumped out of second story window     History of fracture due to fall 12/11/2019    History of hepatitis C     treated    History of penicillin allergy     History of vitamin D deficiency 5/10/2017    Hypertensive heart disease without heart failure     Better controlled     Hyperuricemia 5/26/2017    Hypothyroidism     Hypoxemia requiring supplemental oxygen 12/29/2014    Intravenous drug user 5/2/2017    Long term current use of aspirin     Memory difficulty     Menopause     Mixed connective tissue disease ([facility] [address] [address] 5/10/2017    Obesity, Class I, BMI 30-34.9     Olecranon bursitis of right elbow 5/4/2017    Opioid dependence ([facility]     On Methadone    Recurrent genital herpes 5/31/2013    Right Achilles tendinitis 5/2/2017    Sarcoidosis     Sickle cell trait ([facility]     Tobacco use disorder     Type 2 diabetes mellitus with diabetic neuropathy, with long-term current use of insulin ([facility])     HbA1c (12/11/2019) = 7.1    Urge urinary incontinence 5/10/2017    Venous insufficiency     Wears glasses       Patient taking anticoagulants no    Patient has a defibrillator: no     Assessment:   Changes in Assessment throughout shift: None     Patient has central line: no Reasons if yes: Removed 12/16/19  Insertion date:n/a Last dressing date:n/a   Patient has Renae Cath: no Reasons if yes: n/a   Insertion date:n/a     Last Vitals:     Vitals:    01/05/20 1626 01/05/20 2146 01/06/20 0740 01/06/20 1550   BP: 126/73 147/78 115/78 116/66   Pulse: 73 70 67 74   Resp: 18 18 18 16   Temp: 98.3 °F (36.8 °C) 97 °F (36.1 °C) 98.9 °F (37.2 °C) 97.6 °F (36.4 °C)   SpO2: 94% 99% 100% 99%   Weight:       LMP: 11/25/2012        PAIN    Pain Assessment    Pain Intensity 1: 7 (01/06/20 1525) Pain Intensity 1: 2 (12/29/14 1105)    Pain Location 1: Back Pain Location 1: Abdomen    Pain Intervention(s) 1: Medication (see MAR) Pain Intervention(s) 1: Medication (see MAR)  Patient Stated Pain Goal: 0 Patient Stated Pain Goal: 0  o Intervention effective: yes    o Other actions taken for pain: no c/o     Skin Assessment  Skin color Skin Color: Appropriate for ethnicity  Condition/Temperature Skin Condition/Temp: Warm, Dry  Integrity Skin Integrity: Incision (comment)  Turgor Turgor: Non-tenting  Weekly Pressure Ulcer Documentation  Pressure  Injury Documentation: No Pressure Injury Noted-Pressure Ulcer Prevention Initiated  Wound Prevention & Protection Methods  Orientation of wound Orientation of Wound Prevention: Posterior  Location of Prevention Location of Wound Prevention: Buttocks, Sacrum/Coccyx  Dressing Present Dressing Present : No  Dressing Status Dressing Status: Intact  Wound Offloading Wound Offloading (Prevention Methods): Adaptive equipment, Bed, pressure reduction mattress, Chair cushion, Pillows, Repositioning, Turning, Wheelchair     INTAKE/OUPUT  Date 01/05/20 0700 - 01/06/20 0659 01/06/20 0700 - 01/07/20 0659   Shift 0700-1859 1900-0659 24 Hour Total 0700-1859 1900-0659 24 Hour Total   INTAKE   Shift Total(mL/kg)         OUTPUT   Urine(mL/kg/hr)           Urine Occurrence(s) 2 x 4 x 6 x 1 x  1 x   Stool           Stool Occurrence(s) 0 x 0 x 0 x 0 x  0 x   Shift Total(mL/kg)         NET         Weight (kg) 78.2 78.2 78.2 78.2 78.2 78.2       Recommendations:  1. Patient needs and requests: met    2. Diet: Cardiac DM Reg /thin liq    3. Pending tests/procedures: none     4. Functional Level/Equipment: wheelchair    5. Estimated Discharge Date: TBD Posted on Whiteboard in Patients Room: yes     HEALS Safety Check    A safety check occurred in the patient's room between off going nurse and oncoming nurse listed above.     The safety check included the below items  Area Items   H  High Alert Medications - Verify all high alert medication drips (heparin, PCA, etc.)   E  Equipment - Suction is set up for ALL patients (with yanker)  - Red plugs utilized for all equipment (IV pumps, etc.)  - WOWs wiped down at end of shift.  - Room stocked with oxygen, suction, and other unit-specific supplies   A  Alarms - Bed alarm is set for fall risk patients  - Ensure chair alarm is in place and activated if patient is up in a chair   L  Lines - Check IV for any infiltration  - Renae bag is empty if patient has a Renae   - Tubing and IV bags are labeled   S  Safety   - Room is clean, patient is clean, and equipment is clean. - Hallways are clear from equipment besides carts. - Fall bracelet on for fall risk patients  - Ensure room is clear and free of clutter  - Suction is set up for ALL patients (with chantellker)  - Hallways are clear from equipment besides carts.    - Isolation precautions followed, supplies available outside room, sign posted

## 2020-01-06 NOTE — PROGRESS NOTES
[facility] PHYSICAL REHABILITATION  [address] [place] [address], [address]     INPATIENT REHABILITATION  DAILY PROGRESS NOTE     Date: 1/6/2020    Name: Nirmal Trevino Age / Sex: 62 y.o. / female   CSN: [id number] MRN: [id number]   [facility] Date: 12/16/2019 Length of Stay: 21 days     Primary Rehab Diagnosis: Impaired Mobility and ADLs secondary to:  1. S/P T10, T9, T8 laminectomy; T7, T8, T9, T10, T11, T12 posterior thoracic fusion; segmental spinal instrumentation; K2M [place] type, T7-T8, T11-T12 (12/11/2019 - Dr. Harsha Amin)  2. History of acute compression fractures of body of thoracic vertebrae (T9 and T10) due to fall      Subjective:     Patient seen and examined. Blood pressure controlled. Blood glucose controlled. Patient's Complaint:   No significant medical complaints    Pain Control: stable, mild-to-moderate joint symptoms intermittently, reasonably well controlled by current meds      Objective:     Vital Signs:  Patient Vitals for the past 24 hrs:   BP Temp Pulse Resp SpO2   01/06/20 0740 115/78 98.9 °F (37.2 °C) 67 18 100 %   01/05/20 2146 147/78 97 °F (36.1 °C) 70 18 99 %   01/05/20 1626 126/73 98.3 °F (36.8 °C) 73 18 94 %        Physical Examination:  GENERAL SURVEY: Patient is awake, alert, oriented x 3, sitting comfortably on the chair, not in acute respiratory distress. HEENT: pale palpebral conjunctivae, anicteric sclerae, no nasoaural discharge, moist oral mucosa  NECK: supple, no jugular venous distention, no palpable lymph nodes  CHEST/LUNGS: symmetrical chest expansion, good air entry, clear breath sounds  HEART: adynamic precordium, good S1 S2, no S3, regular rhythm, no murmurs  ABDOMEN: obese, bowel sounds appreciated, soft, non-tender  EXTREMITIES: pale nailbeds, no edema, full and equal pulses, no calf tenderness   NEUROLOGICAL EXAM: The patient is awake, alert and oriented x3, able to answer questions fairly appropriately, able to follow 1 and 2 step commands.   Able to tell time from the wall clock. Cranial nerves II-XII are grossly intact. No gross sensory deficit. Motor strength is 4-/5 on BUE, 2+/5 on the RLE (except for 1/5 on the right ankle), 2-/5 on the LLE (except for 2+/5 on the left knee).      Incision(s): healing well, clean, dry, and intact and serosanguineous       Current Medications:  Current Facility-Administered Medications   Medication Dose Route Frequency    busPIRone (BUSPAR) tablet 5 mg  5 mg Oral TID    pantoprazole (PROTONIX) tablet 40 mg  40 mg Oral ACB    calcium carbonate (TUMS) chewable tablet 200 mg [elemental]  200 mg Oral TID PRN    insulin glargine (LANTUS) injection 25 Units  25 Units SubCUTAneous ACB    insulin lispro (HUMALOG) injection 4 Units  4 Units SubCUTAneous TIDAC    lubiPROStone (AMITIZA) capsule 24 mcg  24 mcg Oral DAILY WITH BREAKFAST    guaiFENesin ER (MUCINEX) tablet 600 mg  600 mg Oral Q12H    acetaminophen (TYLENOL) tablet 650 mg  650 mg Oral Q4H PRN    bisacodyl (DULCOLAX) tablet 10 mg  10 mg Oral Q48H PRN    ferrous sulfate tablet 325 mg  1 Tab Oral DAILY WITH BREAKFAST    ascorbic acid (vitamin C) (VITAMIN C) tablet 250 mg  250 mg Oral DAILY WITH BREAKFAST    insulin lispro (HUMALOG) injection   SubCUTAneous TIDAC    senna-docusate (PERICOLACE) 8.6-50 mg per tablet 2 Tab  2 Tab Oral PCD    methadone (DOLOPHINE) tablet 20 mg  20 mg Oral DAILY    oxyCODONE-acetaminophen (PERCOCET 10)  mg per tablet 1 Tab  1 Tab Oral Q4H PRN    predniSONE (DELTASONE) tablet 30 mg  30 mg Oral DAILY WITH BREAKFAST    rosuvastatin (CRESTOR) tablet 5 mg  5 mg Oral QHS    albuterol (PROVENTIL VENTOLIN) nebulizer solution 2.5 mg  2.5 mg Nebulization Q4H PRN    levothyroxine (SYNTHROID) tablet 75 mcg  75 mcg Oral 6am    cholecalciferol (VITAMIN D3) (400 Units /10 mcg) tablet 5 Tab  2,000 Units Oral DAILY    calcium citrate tablet 200 mg [elemental]  200 mg Oral BID    divalproex DR (DEPAKOTE) tablet 500 mg  500 mg Oral QHS  fluticasone-vilanterol (BREO ELLIPTA) 100mcg-25mcg/puff  1 Puff Inhalation DAILY    oxybutynin (DITROPAN) tablet 5 mg  5 mg Oral BID    allopurinol (ZYLOPRIM) tablet 100 mg  100 mg Oral DAILY    sertraline (ZOLOFT) tablet 50 mg  50 mg Oral DAILY    methocarbamol (ROBAXIN) tablet 500 mg  500 mg Oral Q8H    aspirin chewable tablet 81 mg  81 mg Oral DAILY WITH BREAKFAST    docusate sodium (COLACE) capsule 100 mg  100 mg Oral DAILY AFTER BREAKFAST       Allergies: Allergies   Allergen Reactions    Moxifloxacin Rash    Pcn [Penicillins] Swelling    Sulfa (Sulfonamide Antibiotics) Swelling       Lab/Data Review:  Recent Results (from the past 24 hour(s))   GLUCOSE, POC    Collection Time: 01/05/20  4:29 PM   Result Value Ref Range    Glucose (POC) 243 (H) 70 - 110 mg/dL   GLUCOSE, POC    Collection Time: 01/05/20  8:36 PM   Result Value Ref Range    Glucose (POC) 148 (H) 70 - 675 mg/dL   METABOLIC PANEL, BASIC    Collection Time: 01/06/20  6:13 AM   Result Value Ref Range    Sodium 140 136 - 145 mmol/L    Potassium 4.1 3.5 - 5.5 mmol/L    Chloride 103 100 - 111 mmol/L    CO2 33 (H) 21 - 32 mmol/L    Anion gap 4 3.0 - 18 mmol/L    Glucose 102 (H) 74 - 99 mg/dL    BUN 22 (H) 7.0 - 18 MG/DL    Creatinine 0.84 0.6 - 1.3 MG/DL    BUN/Creatinine ratio 26 (H) 12 - 20      GFR est AA >60 >60 ml/min/1.73m2    GFR est non-AA >60 >60 ml/min/1.73m2    Calcium 8.9 8.5 - 10.1 MG/DL   CBC WITH AUTOMATED DIFF    Collection Time: 01/06/20  6:13 AM   Result Value Ref Range    WBC 10.3 4.6 - 13.2 K/uL    RBC 3.79 (L) 4.20 - 5.30 M/uL    HGB 10.7 (L) 12.0 - 16.0 g/dL    HCT 34.1 (L) 35.0 - 45.0 %    MCV 90.0 74.0 - 97.0 FL    MCH 28.2 24.0 - 34.0 PG    MCHC 31.4 31.0 - 37.0 g/dL    RDW 18.6 (H) 11.6 - 14.5 %    PLATELET 358 (L) 296 - 420 K/uL    MPV 10.1 9.2 - 11.8 FL    NEUTROPHILS 55 40 - 73 %    LYMPHOCYTES 37 21 - 52 %    MONOCYTES 8 3 - 10 %    EOSINOPHILS 0 0 - 5 %    BASOPHILS 0 0 - 2 %    ABS.  NEUTROPHILS 5.6 1.8 - 8.0 K/UL    ABS. LYMPHOCYTES 3.8 (H) 0.9 - 3.6 K/UL    ABS. MONOCYTES 0.8 0.05 - 1.2 K/UL    ABS. EOSINOPHILS 0.0 0.0 - 0.4 K/UL    ABS. BASOPHILS 0.0 0.0 - 0.1 K/UL    DF AUTOMATED     GLUCOSE, POC    Collection Time: 01/06/20  7:29 AM   Result Value Ref Range    Glucose (POC) 109 70 - 110 mg/dL   GLUCOSE, POC    Collection Time: 01/06/20 11:44 AM   Result Value Ref Range    Glucose (POC) 145 (H) 70 - 110 mg/dL       Estimated Glomerular Filtration Rate:  On admission, estimated GFR based on a Creatinine of 0.68 mg/dl:              Using CKD-EPI = 107.9 mL/min/1.73m2              Using MDRD = 112.4 mL/min/1.73m2  Most recent estimated GFR, based on a Creatinine of 0.84 mg/dl on 1/6/2020:   Using CKD-EPI = 85.7 mL/min/1.73m2   Using MDRD = 88.8 mL/min/1.73m2       Assessment:     Primary Rehabilitation Diagnosis  1. Impaired Mobility and ADLs  2. S/P T10, T9, T8 laminectomy; T7, T8, T9, T10, T11, T12 posterior thoracic fusion; segmental spinal instrumentation; K2M [place] type, T7-T8, T11-T12 (12/11/2019 - Dr. Shruti Blue)  3.  History of acute compression fractures of body of thoracic vertebrae (T9 and T10) due to fall     Comorbidities   Diabetic neuropathy associated with type 2 diabetes mellitus (HCC) E11.40    Venous insufficiency I87.2    Obesity, Class I, BMI 30-34.9 E66.9    Chronic back pain M54.9, G89.29    Generalized osteoarthritis of multiple sites M15.9    Bipolar affective disorder  F31.9    Abnormally low high density lipoprotein (HDL) cholesterol with hypertriglyceridemia E78.6, K62.6    Diastolic dysfunction without heart failure I51.89    Chronic obstructive pulmonary disease (COPD)  J44.9    Hypertensive heart disease without heart failure I11.9    Depression F32.9    History of back injury Z87.828    Type 2 diabetes mellitus with diabetic neuropathy, with long-term current use of insulin  E11.40, Z79.4    Anxiety F41.9    Wears glasses Z97.3    History of hepatitis C Z86.19    Sickle cell trait D57.3    History of acute renal failure Z87.448    Hypothyroidism E03.9    Recurrent genital herpes A60.00    Sarcoidosis D86.9    Abscess of right arm L02.413    Hypoxemia requiring supplemental oxygen R09.02, Z99.81    Abnormal nuclear stress test R94.39    Cellulitis and abscess of hand L03.119, L02.519    Cellulitis and abscess of foot L03.119, L02.619    Cellulitis of arm, right L03. 113    Olecranon bursitis of right elbow M70.21    Contusion of left elbow S50. 02XA    Intravenous drug user F19.90    Right Achilles tendinitis M76.61    History of penicillin allergy Z88.0    Falls frequently R29.6    Gastroesophageal reflux disease with hiatal hernia K21.9, K44.9    Memory difficulty R41.3    Mixed connective tissue disease  M35.1    Hyperuricemia E79.0    History of vitamin D deficiency Z86.39    Urge urinary incontinence N39.41    Acute blood loss as cause of postoperative anemia D62    History of fracture due to fall Z87.81    Chronic respiratory failure with hypoxia  J96.11    Cellulitis of right forearm L03. 113    Gout M10.9    Menopause Z78.0    Long term current use of aspirin Z79.82    Opioid dependence  F11.20    Tobacco use disorder F17.200        Plan:     1. Justification for continued stay: Good progression towards established rehabilitation goals. 2. Medical Issues being followed closely:    [x]  Fall and safety precautions     []  Wound Care     [x]  Bowel and Bladder Function     [x]  Fluid Electrolyte and Nutrition Balance     []  Pain Control      3. Issues that 24 hour rehabilitation nursing is following:    [x]  Fall and safety precautions     []  Wound Care     [x]  Bowel and Bladder Function     [x]  Fluid Electrolyte and Nutrition Balance     []  Pain Control      [x]  Assistance with and education on in-room safety with transfers to and from the bed, wheelchair, toilet and shower.       4. Acute rehabilitation plan of care:    [x]  Continue current care and rehab. [x]  Physical Therapy           [x]  Occupational Therapy           []  Speech Therapy     []  Hold Rehab until further notice     5. Medications:    [x]  MAR Reviewed     [x]  Continue Present Medications     6. DVT Prophylaxis:      []  Lovenox     []  Unfractionated Heparin     []  Coumadin     []  NOAC     [x]  ROBERT Stockings     []  Sequential Compression Device     []  None     7. Orders:   > S/P T10, T9, T8 laminectomy; T7, T8, T9, T10, T11, T12 posterior thoracic fusion; segmental spinal instrumentation; K2M [place] type, T7-T8, T11-T12 (12/11/2019 - Dr. Hyacinth Medina);  History of acute compression fractures of body of thoracic vertebrae (T9 and T10) due to fall   > Thoracic spinal precautions   > Continue:    > Calcium citrate 200 mg PO BID    > Cholecalciferol 2,000 units PO once daily    > Acute Postoperative Blood Loss Anemia   > Anemia work-up (12/13/2019) showed serum iron 11, TIBC 215, iron % saturation 5, ferritin 146   > Hgb/Hct (12/17/2019, on admission) = 7.4/23.1   > Reticulocyte count (12/17/2019) = 2.5   > Hgb/Hct (12/23/2019) = 8.5/26.7    > Continue:    > FeSO4 325 mg PO once daily with breakfast     > Ascorbic Acid 250 mg PO once daily with breakfast (to enhance the absorption of the FeSO4)     > Benign hypertensive heart and kidney disease with stage 3 chronic kidney disease without congestive heart failure   > Prior to admission, the patient stated she was on Metolazone 2.5 mg PO q Mon-Wed-Fri   > Metolazone was not given during the patient's hospital stay at [facility]    > Upon admission to the ARU, held Metolazone     > Chronic obstructive pulmonary disease; Chronic respiratory failure with hypoxemia requiring supplemental oxygen (3 LPM)   > Continue:    > Fluticasone-Vilanterol 100-25 1 puff inhaled once daily    > O2 inhalation at 3 LPM via nasal cannula continuous    > Opioid dependence   > Continue Methadone 20 mg PO once daily    > Type 2 diabetes mellitus with stage 3 chronic kidney disease   > HbA1c (12/11/2019) = 7.1   > On 12/22/2019:    > Increased Insulin glargine from 20 units to 22 units PO once daily before breakfast    > Started Insulin lispro 3 units SC TID AC    > On 12/24/2019, increased Insulin lispro from 3 units to 4 units SC TID AC    > On 12/25/2019, increased Insulin glargine from 22 units to 25 units PO once daily before breakfast   > Continue:    > Insulin glargine 25 units PO once daily before breakfast    > Insulin lispro 4 units SC TID AC     > Insulin lispro sliding scale SC TID AC only    > Cough   > On 12/18/2019, started Guaifenesin  mg PO q 12 hr   > Continue Guaifenesin  mg PO q 12 hr    > Constipation   > On 12/17/2019, started:    > Docusate sodium 100 mg PO once daily after breakfast    > PeriColace 2 tabs PO once daily after dinner   > On 12/22/2019, started Lubiprostone 24 mcg PO once daily with breakfast   > Continue:    > Docusate sodium 100 mg PO once daily after breakfast    > PeriColace 2 tabs PO once daily after dinner    > Lubiprostone 24 mcg PO once daily with breakfast    > Gastroesophageal reflux disease   > On 1/2/2019, Famotidine 20 mg PO BID was changed to Pantoprazole 40 mg PO once daily before breakfast   > Continue Pantoprazole 40 mg PO once daily before breakfast    > Anxiety   > On 1/1/2020, started Buspirone 5 mg PO TID   > Continue Buspirone 5 mg PO TID    > Analgesia   > Continue:    > Acetaminophen 650 mg PO q 4 hr PRN for pain level less than 5/10    > Methocarbamol 500 mg PO q 8 hr    > Percocet 10/325 1 tab PO q 4 hr PRN for pain level greater than 4/10     > Diet:   > Specifications: Cardiac, diabetic consistent carb 1800 kcal, no concentrated sweets, low purine   > Solids (consistency): Regular    > Liquids (consistency): Thin       8. Patient's progress in rehabilitation and medical issues discussed with the patient.  All questions answered to the best of my ability. Care plan discussed with patient and nurse.       Signed:    Tonya Hedrick MD    January 6, 2020

## 2020-01-06 NOTE — PROGRESS NOTES
conducted a Follow up consultation and Spiritual Assessment for Ilda Tipton, who is a 62 y.o.,female. The  provided the following Interventions:  Continued the relationship of care and support. Listened empathically. Patient requested prayers for her son whom she loves so much but has \"bitterness\" about the past. Family support comes from her oldest daughter who lives local. Patient shared her jassi being a first grandma-to-be from her youngest daughter who just announced being pregnant. Offered prayer and assurance of continued prayer on patient's behalf. Everyday Prayer booklet and [facility] Prayer and reflection booklet were provided. Chart reviewed. The following outcomes were achieved:  Patient expressed gratitude for 's visit. Assessment:  There are no further spiritual or Confucianism issues which require Spiritual Care Services interventions at this time. Plan:  Chaplains will continue to follow and will provide pastoral care on an as needed/requested basis.  recommends bedside caregivers page  on duty if patient shows signs of acute spiritual or emotional distress.      [facility], [address]   [phone]

## 2020-01-06 NOTE — INTERDISCIPLINARY ROUNDS
[address]  [address] [place] [address], [address]     INPATIENT REHABILITATION  DISCHARGE RECOMMENDATION SHEET    Date: 1/6/2020     Name: Rosanna Jackson Age / Sex: 62 y.o. / female   CSN: [id number] MRN: [id number]   [facility] Date: 12/16/2019 Discharge Date: 1/14/2020        [facility] WALKING AIDS FOLLOW-UP SERVICES      Height  []  Straight cane  [] DMV Handicap Placard    []  #14 ½ karan height  []  Forearm crutches OUTPATIENT    []  Karan height  []  Axillary crutches  []  PT    [x]  Standard height  []  LBQC  []  OT    []  Reclining high back  []  SBQC  []  ST       Weight  HOME HEALTH    [x]  Standard weight WALKERS  [x]  PT    []  Lightweight  []  Standard height  [x]  OT    []  High-strength lightweight  []  Small adult  []  ST    []  Heavy Duty   []  Tall walker  []  Nursing    []  Patient is unable to self-propel a standard weight wheelchair  []  Rolling walker with 5 single fixed wheels  []  Wound care      []  Anticoagulation management       Width  []  Bariatric walker  [x]  Diabetes management    []  Narrow (16)   [x]  Insulin management    [x]  Standard (18) ACCESSORIES  []  No insulin management    []  Wide (20)  []  Rear sure glide brakes  [x]  BP management    []  Other  []  10 rear wheel brake  []  CHF Telehealth       Arms  []  Tall leg extensions  []  Post-CABG care/monitoring    []  Standard  []  Other:  []  Colostomy care    [x]  Desk   []  Tube feeding    [x]  Removable BATHROOM EQUIPMENT  []  PICC line care      Foot/Leg Rests  []  Bedside commode  []  Renae catheter care    [x]  Removable  []  Extra wide bedside commode  []  Other:     [x]  Elevating  [x]  3:1 commode  []  Medical Social Worker      Other  []  3:1 commode WITH drop arms  [x]  Aide    []  Brake extensions  []  Tub transfer bench     []  Padded gloves  []  Tub chair     []  Antitippers  []  Other:          CUSHIONS OTHER     []  Foam cushion  []  Seat belt     [x]  Gel cushion  [x] Gait belt     []  Alvevane Buys II  [x]  Transfer board - Size: 32 inches     []  Roho  []  Oxygen     []  Other  []  CPM      []  [address]  [x]  Ramp     []  Hospital bed  [x]  Hip kit Issued     []  Special mattress  []  Reacher     []  Trapeze bar       Preferred Local Pharmacy (not mail[facility]): [facility]    Physical Therapy STEVE Santoro   Occupational Therapy Paresh Burdick   Speech-Language Therapy    Social Work Chichi Jimenez MSW   Nursing Natalya Badillo, RN BSN

## 2020-01-06 NOTE — PROGRESS NOTES
Problem: Self Care Deficits Care Plan (Adult)  Goal: *Therapy Goal (Edit Goal, Insert Text)  Description  Occupational Therapy Goals   Long Term Goals  Initiated 12/17/19 and to be accomplished within 4 week(s)  to facilitate increased self care independence and strength for return to PLOF and decreased care giver burden:   2. Pt will perform grooming with Mod I.   3. Pt will perform UB bathing with SBA. 4. Pt will perform LB bathing with Tyree. 5. Pt will perform tub/shower transfer <> TTB with Min A.   6. Pt will perform UB dressing with Set-up. 7. Pt will perform LB dressing with Min A.  8. Pt will perform toileting task with Min A.  9. Pt will perform toilet transfer with Min A/CGA. Short Term Goals   Initiated 12/17/19 and to be accomplished within 7 day(s) (1/7/2020) to facilitate increased self care independence and strength for return to PLOF and decreased care giver burden:   1. Pt will perform self-feeding with Mod I.   2. Pt will perform grooming with set-up. ()  3. Pt will perform UB bathing with Mod A. ()  4. Pt will perform LB bathing with Mod A in LRE. ()  5. Pt will perform tub/shower transfer <> TTB with MaxA x 1.- 12/31  6. Pt will perform UB dressing with SBA. - 12/31  7. Pt will perform LB dressing with Mod A using AE as needed. ()  8. Pt will perform toileting task with Max A x 1. ()  9. Pt will perform toilet transfer with MaxA x 1. Outcome Measures:  (12/17/19) Dynamometer  strength: Right= 27.33# (norm=55. 1#) Left= 34.67# (norm=45.7#)  (12/24/19) 9-hole peg test: Right=45.46 seconds ; Left=41.13 seconds        Outcome: Progressing Towards Goal  Goal: *Therapy Goal (Edit Goal, Insert Text)  Outcome: Progressing Towards Goal  Goal: Interventions  Outcome: Progressing Towards Goal  OT WEEKLY PROGRESS NOTE  Patient Name:Stephen Sinha   Time Spent With Patient  Time In: 0800   Time Out: 900  Time In: 130  Time Out: 210    Medical Diagnosis:  Status post laminectomy with spinal fusion [Z98.1] Status post laminectomy with spinal fusion     Pain at start of tx: 8/10 pain or discomfort in back 7 1/2 in rectum. Pain at stop of tx: 8/10 pain or discomfort in back    Patient identified with name and :*yes  Subjective: 'Pt reported pain rectum and back. They wash up my bottom n P'          Objective: Pt seen for sponge bath at sink washing upper body 2/2 lower body  was washed by nursing following a BM earlier per pt . Pt was seated in wheel chair and place in front of sink for bathing than propelled self half way to the dinning room using mostly UE's, cues pt to incorporate feet with propelling. PM session: Toileting tasks see below      Outcome Measures: Pt to d/c home with Home Health.      AROM: UE's [facility]      COGNITION/PERCEPTION Initial Assessment Weekly Progress Assessment 2020   Premorbid Reading Status Literate   Literate   Premorbid Writing Status [facility]   Arousal/Alertness Generalized responses   Generalized responses   Orientation Level Oriented X4   Oriented X4   Visual Fields ([facility] reading pt's board )  [facility]   Praxis Intact  Intact   Body Scheme Appears intact  Intact   COMPREHENSION MODE Initial Assessment Weekly Progress Assessment 2020   Primary Mode of Comprehension Auditory   Auditory   Hearing Aide None  None   Corrective Lenses Glasses  Glasses   Score 6  6     EXPRESSION Initial Assessment Weekly Progress Assessment 2020   Primary Mode of Expression Verbal Verbal   Score 6 6   Comments         SOCIAL INTERACTION/ PRAGMATICS Initial Assessment Weekly Progress Assessment 2020   Score 5 5   Comments re-direction to task at times re-direction to task at times     PROBLEM SOLVING Initial Assessment Weekly Progress Assessment 2020   Score 4 4   Comments min cues for following spinal precautions min cues for following spinal precautions     MEMORY Initial Assessment Weekly Progress Assessment 2020   Score 5 5   Comments          EATING Initial Assessment Weekly Progress Assessment 1/6/2020   Functional Level 5  5   Comments Pt requiring encouragement to demo container mgmt I'ly, requiring set-up over all. Pt able to demo utensil use I'ly   Pt set-up, than self feed with increase time. GROOMING Initial Assessment Weekly Progress Assessment 1/6/2020   Functional Level 4 6   Tasks completed by patient Washed face     Comments Pt washing face seated EOB with Elida for sitting balance when pt letting go of bed railing to perform grooming task. Grooming  Grooming Assistance : 6 (Modified independent)  Comments: Pt retrieved, clean and returned dentures to mouth       BATHING Initial Assessment Weekly Progress Assessment 1/6/2020   Functional Level 2     4   Body parts patient bathed Abdomen, Chest, Thigh, left, Thigh, right     Comments UB: Pt requiring modA, pt demo'ing washing adbomen/chest with CGA for sitting balance. Pt requiring (A) for thoroughness for UEs and back. LB: Pt requiring maxA for LB bathing seated EOB/supine/sidelying. Pt assisting with washing thighs seated EOB, requiring therapist (A) for remainder. Pt requiring maxA to roll side:side following log roll in bed to perform perineal bathing. Upper Body Bathing  Bathing Assistance, Upper: 5 (Supervision)  Position Performed: Seated in chair  Comments: Pt wash up at sink with min set up . Pt reaching forward turning off faucet than washing body      . Lower Body Bathing  Bathing Assistance, Lower : 4 (Minimal assistance)  Adaptive Equipment: Long handled sponge  Position Performed: Seated in chair  Comments: Pt washer LE's with LH sponge, min assist with rinsing.      TUB/SHOWER TRANSFER INDEPENDENCE Initial Assessment Weekly Progress Assessment 1/6/2020   Score 0  0 NT   Comments NT at this time due to safety and medical status        UPPER BODY DRESSING/UNDRESSING Initial Assessment Weekly Progress Assessment 1/6/2020   Functional Level 3 5   Items applied/Steps completed Pullover (4 steps)     Comments Pt requiring modA to [facility] hospital gown and modA to shante t-shirt. Pt unable to pull down shirt anterior/posterior. Upper Body Dressing   Dressing Assistance : 5 (Supervision)  Comments: Pt set up with under shirt and T-shirt, donning under shirt and T-shirt with supervision. LOWER BODY DRESSING/UNDRESSING Initial Assessment Weekly Progress Assessment 1/6/2020   Functional Level 1 2   Items applied/Steps completed Sock, left (1 step), Sock, right (1 step), Elastic waist pants (3 steps), Underpants (3 steps) Lower Body Dressing   Dressing Assistance : 2 (Maximal assistance)  Leg Crossed Method Used: No  Position Performed: Seated in chair  Adaptive Equipment Used: Long handled shoe horn  Comments:     Comments Pt requiring total asst for compression stockings/sock mgmt with pt seated EOB. Pt rolling side:side with maxA with rehab tech present to asst with brief mgmt and donning pants. Pt attempting to bridge hips to shante pants over hips with pt unable to fully clear sacrum. Assisted pt with changing laces for elastic to aid in independence with doffing/donning shoes. Pt able to remove laces from one shoe. Mod A to doff shoes with regular laces using shoe horn; Max A to shante with elastic shoe laces  Note from JOSSY Mcnally 1/4     TOILETING Initial Assessment Weekly Progress Assessment 1/6/2020   Functional Level 1  3   Comments Pt incontinent of bladder/bowel, requiring total asst for hygiene and brief mgmt from bed level. Toileting  Toileting Assistance (FIM Score): 3 (Moderate assistance )  Cues: Physical assistance for pants down;Physical assistance for pants up;Verbal cues provided to assist with CM and hygiene pt providing min assist while following lumbar precaution with verbal cuing.  Cheyanne Settler Equipment: Other (comment)([facility])     TOILET TRANSFER INDEPENDENCE Initial Assessment Weekly Progress Assessment 1/6/2020   Transfer score 0 4-Grossly   Comments NT at this time Functional Transfers  Toilet Transfer : Lateral with transfer board   Amount of Assistance Required: 4 (Minimal assistance)(Grossly)              ASSESSMENT: Pt  con't to progress with UE self care however, req'd cuing to challenge self. Pt propelled self ~3 ft using feet with cues for heals strike. Pt provided min assist with  toileting tasks and improve with transfer on sliding board. Progression toward goals: Pt toileting transfer STG's  []          Improving appropriately and progressing toward goals  [x]          Improving slowly and progressing toward goals  []          Not making progress toward goals and plan of care will be adjusted     PLAN:  Patient continues to benefit from skilled intervention to address the above impairments. Continue treatment per established plan of care. Discharge Recommendations: Home Health  Further Equipment Recommendations for Discharge: 3;1 commode     Please refer to the flow sheet for vital signs taken during this treatment. After treatment:   [x]  Patient left in no apparent distress sitting up in chair  []  Patient left in no apparent distress in bed  []  Call bell left within reach  []  Nursing notified  []  Caregiver present  [x] Pt hand off to dinning room staff     COMMUNICATION/EDUCATION:   [] Home safety education was provided and the patient/caregiver indicated understanding. [] Patient/family have participated as able in goal setting and plan of care. [] Patient/family agree to work toward stated goals and plan of care. [x] Patient understands intent and goals of therapy, but is neutral about his/her participation. [] Patient is unable to participate in goal setting and plan of care. Plan of Care: Please see Care Plan for updated STG/LTGs.    Family Training: TBD  Estimated LOS: 1/14/20*    Asa Chick REGALADO/L  1/6/2020

## 2020-01-06 NOTE — PROGRESS NOTES
Sw spoke with pt's daughter to set family education with pt's newly assigned personal care aide for Weds and Fri at [address] [place] [address]. Daughter requests that she also attend a family education session Sat at 6. Sw will continue to follow.

## 2020-01-06 NOTE — PROGRESS NOTES
SHIFT CHANGE NOTE FOR [facility]    Bedside and Verbal shift change report given to Martha Carrasquillo (oncoming nurse) by Haylie Jenkins, RN (offgoing nurse). Report included the following information SBAR, Kardex, MAR and Recent Results.     Situation:   Code Status: Full Code   Reason for Admission: Spinal Thoracic laminectomy T 6-T 6510 MyUnfold Day: 21   Problem List:   Hospital Problems  Date Reviewed: 12/29/2019          Codes Class Noted POA    Chronic respiratory failure with hypoxia (HCC) (Chronic) ICD-10-CM: J96.11  ICD-9-CM: 518.83, 799.02  Unknown Yes    Overview Signed 12/16/2019 10:11 PM by David Carson MD     On home oxygen inhalation at 3 LPM via NC             Opioid dependence ([facility] (Chronic) ICD-10-CM: F11.20  ICD-9-CM: 304.00  Unknown Yes    Overview Signed 12/16/2019 10:54 PM by David Carson MD     On Methadone             Acute blood loss as cause of postoperative anemia ICD-10-CM: D62  ICD-9-CM: 285.1  12/12/2019 Yes        Impaired mobility and ADLs ICD-10-CM: Z74.09  ICD-9-CM: 799.89  12/11/2019 Yes        Spinal cord compression ([facility] ICD-10-CM: G95.20  ICD-9-CM: 336.9  12/11/2019 Yes        Compression fracture of body of thoracic vertebra ([facility] ICD-10-CM: S22.000A  ICD-9-CM: 805.2  12/11/2019 Yes        * (Principal) Status post laminectomy with spinal fusion ICD-10-CM: Z98.1  ICD-9-CM: V45.4  12/11/2019 Yes    Overview Signed 12/16/2019 10:05 PM by David Carson MD     S/P T10, T9, T8 laminectomy; T7, T8, T9, T10, T11, T12 posterior thoracic fusion; segmental spinal instrumentation; K2M Davis type, T7-T8, T11-T12 (12/11/2019 - Dr. Steven Saba)             History of fracture due to fall ICD-10-CM: Z87.81  ICD-9-CM: V15.51  12/11/2019 Yes        Hypoxemia requiring supplemental oxygen (Chronic) ICD-10-CM: R09.02, Z99.81  ICD-9-CM: 799.02  12/29/2014 Yes        Chronic obstructive pulmonary disease (COPD) (HCC) (Chronic) ICD-10-CM: J44.9  ICD-9-CM: 496  Unknown Yes    Overview Signed 4/23/2013 10:01 AM by Piper Houston     SOB, on paula O2  Recent admission with psychosis             Hypertensive heart disease without heart failure (Chronic) ICD-10-CM: I11.9  ICD-9-CM: 402.90  Unknown Yes    Overview Signed 4/23/2013 10:02 AM by Paul CALLES     Better controlled             Type 2 diabetes mellitus with diabetic neuropathy, with long-term current use of insulin ([facility] (Chronic) ICD-10-CM: E11.40, Z79.4  ICD-9-CM: 250.60, 357.2, V58.67  Unknown Yes    Overview Signed 12/18/2019  2:13 PM by Mingo Woods MD     HbA1c (12/11/2019) = 7.1                   Background:   Past Medical History:   Past Medical History:   Diagnosis Date    Abnormally low high density lipoprotein (HDL) cholesterol with hypertriglyceridemia     Lipid profile (11/6/2016) showed , , HDL 38, LDL 37    Acute blood loss as cause of postoperative anemia 12/12/2019    Anxiety     Bipolar affective disorder ([facility] 12/5/2012    Cellulitis of right forearm 05/04/2017    Chronic back pain     Chronic obstructive pulmonary disease (COPD) (HCC)     SOB, on paula O2 Recent admission with psychosis     Chronic respiratory failure with hypoxia (HCC)     On home oxygen inhalation at 3 LPM via NC    Contusion of left elbow 5/4/2017    Depression     Diabetic neuropathy associated with type 2 diabetes mellitus (HCC)     Diastolic dysfunction without heart failure     Stable on diuretics     Falls frequently     Gastroesophageal reflux disease with hiatal hernia     Generalized osteoarthritis of multiple sites     Gout     On Allopurinol    History of acute renal failure 5/31/2013    History of back injury     jumped out of second story window     History of fracture due to fall 12/11/2019    History of hepatitis C     treated    History of penicillin allergy     History of vitamin D deficiency 5/10/2017    Hypertensive heart disease without heart failure     Better controlled     Hyperuricemia 5/26/2017    Hypothyroidism     Hypoxemia requiring supplemental oxygen 12/29/2014    Intravenous drug user 5/2/2017    Long term current use of aspirin     Memory difficulty     Menopause     Mixed connective tissue disease ([facility] 5/10/2017    Obesity, Class I, BMI 30-34.9     Olecranon bursitis of right elbow 5/4/2017    [facility]io[facility] dependence ([facility]     On Methadone    Recurrent genital herpes 5/31/2013    Right Achilles tendinitis 5/2/2017    Sarcoidosis     Sickle cell trait ([facility]     Tobacco use disorder     Type 2 diabetes mellitus with diabetic neuropathy, with long-term current use of insulin ([facility])     HbA1c (12/11/2019) = 7.1    Urge urinary incontinence 5/10/2017    Venous insufficiency     Wears glasses       Patient taking anticoagulants no    Patient has a defibrillator: no     Assessment:   Changes in Assessment throughout shift: None     Patient has central line: no Reasons if yes: Removed 12/16/19  Insertion date:n/a Last dressing date:n/a   Patient has Renae Cath: no Reasons if yes: n/a   Insertion date:n/a     Last Vitals:     Vitals:    01/04/20 2203 01/05/20 0742 01/05/20 1626 01/05/20 2146   BP: 117/72 132/77 126/73 147/78   Pulse: 79 68 73 70   Resp: 18 18 18 18   Temp: 98 °F (36.7 °C) 98.1 °F (36.7 °C) 98.3 °F (36.8 °C) 97 °F (36.1 °C)   SpO2: 96% 98% 94% 99%   Weight:       LMP: 11/25/2012        PAIN    Pain Assessment    Pain Intensity 1: 0 (01/06/20 0410) Pain Intensity 1: 2 (12/29/14 1105)    Pain Location 1: Back Pain Location 1: Abdomen    Pain Intervention(s) 1: Medication (see MAR) Pain Intervention(s) 1: Medication (see MAR)  Patient Stated Pain Goal: 0 Patient Stated Pain Goal: 0  o Intervention effective: yes    o Other actions taken for pain: no c/o     Skin Assessment  Skin color Skin Color: Appropriate for ethnicity  Condition/Temperature Skin Condition/Temp: Dry, Warm  Integrity Skin Integrity: Incision (comment)  Turgor Turgor: Non-tenting  Weekly Pressure Ulcer Documentation  Pressure  Injury Documentation: No Pressure Injury Noted-Pressure Ulcer Prevention Initiated  Wound Prevention & Protection Methods  Orientation of wound Orientation of Wound Prevention: Posterior  Location of Prevention Location of Wound Prevention: Buttocks, Sacrum/Coccyx  Dressing Present Dressing Present : No  Dressing Status Dressing Status: Intact  Wound Offloading Wound Offloading (Prevention Methods): Bed, pressure redistribution/air     INTAKE/OUPUT  Date 01/05/20 0700 - 01/06/20 0659 01/06/20 0700 - 01/07/20 0659   Shift 0700-1859 1900-0659 24 Hour Total 0700-1859 1900-0659 24 Hour Total   INTAKE   Shift Total(mL/kg)         OUTPUT   Urine(mL/kg/hr)           Urine Occurrence(s) 2 x 3 x 5 x      Stool           Stool Occurrence(s) 0 x 0 x 0 x      Shift Total(mL/kg)         NET         Weight (kg) 78.2 78.2 78.2 78.2 78.2 78.2       Recommendations:  1. Patient needs and requests: met    2. Diet: Cardiac DM Reg /thin liq    3. Pending tests/procedures: none     4. Functional Level/Equipment: wheelchair    5. Estimated Discharge Date: TBD Posted on Whiteboard in Patients Room: yes     HEALS Safety Check    A safety check occurred in the patient's room between off going nurse and oncoming nurse listed above.     The safety check included the below items  Area Items   H  High Alert Medications - Verify all high alert medication drips (heparin, PCA, etc.)   E  Equipment - Suction is set up for ALL patients (with yanker)  - Red plugs utilized for all equipment (IV pumps, etc.)  - WOWs wiped down at end of shift.  - Room stocked with oxygen, suction, and other unit-specific supplies   A  Alarms - Bed alarm is set for fall risk patients  - Ensure chair alarm is in place and activated if patient is up in a chair   L  Lines - Check IV for any infiltration  - Renae bag is empty if patient has a Renae   - Tubing and IV bags are labeled   S  Safety   - Room is clean, patient is clean, and equipment is clean. - Hallways are clear from equipment besides carts. - Fall bracelet on for fall risk patients  - Ensure room is clear and free of clutter  - Suction is set up for ALL patients (with lashay)  - Hallways are clear from equipment besides carts.    - Isolation precautions followed, supplies available outside room, sign posted

## 2020-01-06 NOTE — PROGRESS NOTES
SHIFT CHANGE NOTE FOR [facility]    Bedside and Verbal shift change report given to Markus Gamez, JENNY (oncoming nurse) by Torsten Gracia RN (offgoing nurse). Report included the following information SBAR, Kardex, MAR and Recent Results.     Situation:   Code Status: Full Code   Reason for Admission: Spinal Thoracic laminectomy T 6-T [address] [employer] Day: 20   Problem List:   Hospital Problems  Date Reviewed: 12/29/2019          Codes Class Noted POA    Chronic respiratory failure with hypoxia (HCC) (Chronic) ICD-10-CM: J96.11  ICD-9-CM: 518.83, 799.02  Unknown Yes    Overview Signed 12/16/2019 10:11 PM by David Carson MD     On home oxygen inhalation at 3 LPM via NC             Opioid dependence ([facility] (Chronic) ICD-10-CM: F11.20  ICD-9-CM: 304.00  Unknown Yes    Overview Signed 12/16/2019 10:54 PM by David Carson MD     On Methadone             Acute blood loss as cause of postoperative anemia ICD-10-CM: D62  ICD-9-CM: 285.1  12/12/2019 Yes        Impaired mobility and ADLs ICD-10-CM: Z74.09  ICD-9-CM: 799.89  12/11/2019 Yes        Spinal cord compression ([facility] ICD-10-CM: G95.20  ICD-9-CM: 336.9  12/11/2019 Yes        Compression fracture of body of thoracic vertebra (HCC) ICD-10-CM: S22.000A  ICD-9-CM: 805.2  12/11/2019 Yes        * (Principal) Status post laminectomy with spinal fusion ICD-10-CM: Z98.1  ICD-9-CM: V45.4  12/11/2019 Yes    Overview Signed 12/16/2019 10:05 PM by David Carson MD     S/P T10, T9, T8 laminectomy; T7, T8, T9, T10, T11, T12 posterior thoracic fusion; segmental spinal instrumentation; K2M Davis type, T7-T8, T11-T12 (12/11/2019 - Dr. Steven Saba)             History of fracture due to fall ICD-10-CM: Z87.81  ICD-9-CM: V15.51  12/11/2019 Yes        Hypoxemia requiring supplemental oxygen (Chronic) ICD-10-CM: R09.02, Z99.81  ICD-9-CM: 799.02  12/29/2014 Yes        Chronic obstructive pulmonary disease (COPD) (HCC) (Chronic) ICD-10-CM: J44.9  ICD-9-CM: 496  Unknown Yes    Overview Signed 4/23/2013 10:01 AM by Jax Florez     SOB, on paula O2  Recent admission with psychosis             Hypertensive heart disease without heart failure (Chronic) ICD-10-CM: I11.9  ICD-9-CM: 402.90  Unknown Yes    Overview Signed 4/23/2013 10:02 AM by Chika CLALES     Better controlled             Type 2 diabetes mellitus with diabetic neuropathy, with long-term current use of insulin ([facility] (Chronic) ICD-10-CM: E11.40, Z79.4  ICD-9-CM: 250.60, 357.2, V58.67  Unknown Yes    Overview Signed 12/18/2019  2:13 PM by Bernard Sandoval MD     HbA1c (12/11/2019) = 7.1                   Background:   Past Medical History:   Past Medical History:   Diagnosis Date    Abnormally low high density lipoprotein (HDL) cholesterol with hypertriglyceridemia     Lipid profile (11/6/2016) showed , , HDL 38, LDL 37    Acute blood loss as cause of postoperative anemia 12/12/2019    Anxiety     Bipolar affective disorder ([facility] 12/5/2012    Cellulitis of right forearm 05/04/2017    Chronic back pain     Chronic obstructive pulmonary disease (COPD) (HCC)     SOB, on paula O2 Recent admission with psychosis     Chronic respiratory failure with hypoxia (HCC)     On home oxygen inhalation at 3 LPM via NC    Contusion of left elbow 5/4/2017    Depression     Diabetic neuropathy associated with type 2 diabetes mellitus (HCC)     Diastolic dysfunction without heart failure     Stable on diuretics     Falls frequently     Gastroesophageal reflux disease with hiatal hernia     Generalized osteoarthritis of multiple sites     Gout     On Allopurinol    History of acute renal failure 5/31/2013    History of back injury     jumped out of second story window     History of fracture due to fall 12/11/2019    History of hepatitis C     treated    History of penicillin allergy     History of vitamin D deficiency 5/10/2017    Hypertensive heart disease without heart failure     Better controlled     Hyperuricemia 5/26/2017    Hypothyroidism     Hypoxemia requiring supplemental oxygen 12/29/2014    Intravenous drug user 5/2/2017    Long term current use of aspirin     Memory difficulty     Menopause     Mixed connective tissue disease ([facility] 5/10/2017    Obesity, Class I, BMI 30-34.9     Olecranon bursitis of right elbow 5/4/2017    Opio[facility] dependence ([facility]     On Methadone    Recurrent genital herpes 5/31/2013    Right Achilles tendinitis 5/2/2017    Sarcoidosis     Sickle cell trait ([facility]     Tobacco use disorder     Type 2 diabetes mellitus with diabetic neuropathy, with long-term current use of insulin ([facility])     HbA1c (12/11/2019) = 7.1    Urge urinary incontinence 5/10/2017    Venous insufficiency     Wears glasses       Patient taking anticoagulants no    Patient has a defibrillator: no     Assessment:   Changes in Assessment throughout shift: None     Patient has central line: no Reasons if yes: Removed 12/16/19  Insertion date:n/a Last dressing date:n/a   Patient has Renae Cath: no Reasons if yes: n/a   Insertion date:n/a     Last Vitals:     Vitals:    01/04/20 1656 01/04/20 2203 01/05/20 0742 01/05/20 1626   BP: 122/66 117/72 132/77 126/73   Pulse: 79 79 68 73   Resp: 18 18 18 18   Temp: 98.7 °F (37.1 °C) 98 °F (36.7 °C) 98.1 °F (36.7 °C) 98.3 °F (36.8 °C)   SpO2: 98% 96% 98% 94%   Weight:       LMP: 11/25/2012        PAIN    Pain Assessment    Pain Intensity 1: 6 (01/05/20 1806) Pain Intensity 1: 2 (12/29/14 1105)    Pain Location 1: Back Pain Location 1: Abdomen    Pain Intervention(s) 1: Medication (see MAR) Pain Intervention(s) 1: Medication (see MAR)  Patient Stated Pain Goal: 0 Patient Stated Pain Goal: 0  o Intervention effective: yes    o Other actions taken for pain: no c/o     Skin Assessment  Skin color Skin Color: Appropriate for ethnicity  Condition/Temperature Skin Condition/Temp: Dry, Warm  Integrity Skin Integrity: Incision (comment)  Turgor Turgor: Non-tenting  Weekly Pressure Ulcer Documentation  Pressure  Injury Documentation: No Pressure Injury Noted-Pressure Ulcer Prevention Initiated  Wound Prevention & Protection Methods  Orientation of wound Orientation of Wound Prevention: Posterior  Location of Prevention Location of Wound Prevention: Buttocks, Sacrum/Coccyx  Dressing Present Dressing Present : No  Dressing Status Dressing Status: Intact  Wound Offloading Wound Offloading (Prevention Methods): Bed, pressure redistribution/air, Bed, pressure reduction mattress, Chair cushion, Wheelchair     INTAKE/OUPUT  Date 01/04/20 1900 - 01/05/20 0659 01/05/20 0700 - 01/06/20 0659   Shift 8477-3067 24 Hour Total 8613-2538 8185-2117 24 Hour Total   INTAKE   Shift Total(mL/kg)        OUTPUT   Urine(mL/kg/hr)          Urine Occurrence(s) 3 x 4 x 2 x  2 x   Stool          Stool Occurrence(s) 0 x 1 x 0 x  0 x   Shift Total(mL/kg)        NET        Weight (kg) 78.2 78.2 78.2 78.2 78.2       Recommendations:  1. Patient needs and requests: met    2. Diet: Cardiac DM Reg /thin liq    3. Pending tests/procedures: none     4. Functional Level/Equipment: wheelchair    5. Estimated Discharge Date: TBD Posted on Whiteboard in Patients Room: yes     HEALS Safety Check    A safety check occurred in the patient's room between off going nurse and oncoming nurse listed above.     The safety check included the below items  Area Items   H  High Alert Medications - Verify all high alert medication drips (heparin, PCA, etc.)   E  Equipment - Suction is set up for ALL patients (with yanker)  - Red plugs utilized for all equipment (IV pumps, etc.)  - WOWs wiped down at end of shift.  - Room stocked with oxygen, suction, and other unit-specific supplies   A  Alarms - Bed alarm is set for fall risk patients  - Ensure chair alarm is in place and activated if patient is up in a chair   L  Lines - Check IV for any infiltration  - Renae bag is empty if patient has a Renae   - Tubing and IV bags are labeled   S  Safety - Room is clean, patient is clean, and equipment is clean. - Hallways are clear from equipment besides carts. - Fall bracelet on for fall risk patients  - Ensure room is clear and free of clutter  - Suction is set up for ALL patients (with lashay)  - Hallways are clear from equipment besides carts.    - Isolation precautions followed, supplies available outside room, sign posted

## 2020-01-06 NOTE — INTERDISCIPLINARY ROUNDS
[id number]  [address] [place] [address], [address]     INPATIENT REHABILITATION  DISCHARGE RECOMMENDATION SHEET    Date: 1/6/2020     Name: Quentin Sherwood Age / Sex: 62 y.o. / female   CSN: [id number] MRN: [id number]   [facility] Date: 12/16/2019 Discharge Date: 1/14/2020        [facility] WALKING AIDS FOLLOW-UP SERVICES      Height  []  Straight cane  [] DMV Handicap Placard    []  #14 ½ karan height  []  Forearm crutches OUTPATIENT    []  Karan height  []  Axillary crutches  []  PT    [x]  Standard height  []  LBQC  []  OT    []  Reclining high back  []  SBQC  []  ST       Weight  HOME HEALTH    [x]  Standard weight WALKERS  [x]  PT    []  Lightweight  []  Standard height  []  OT    []  High-strength lightweight  []  Small adult  []  ST    []  Heavy Duty   []  Tall walker  []  Nursing    []  Patient is unable to self-propel a standard weight wheelchair  []  Rolling walker with 5 single fixed wheels  []  Wound care      []  Anticoagulation management       Width  []  Bariatric walker  []  Diabetes management    []  Narrow (16)   []  Insulin management    [x]  Standard (18) ACCESSORIES  []  No insulin management    []  Wide (20)  []  Rear sure glide brakes  []  BP management    []  Other  []  10 rear wheel brake  []  CHF Telehealth       Arms  []  Tall leg extensions  []  Post-CABG care/monitoring    []  Standard  []  Other:  []  Colostomy care    [x]  Desk   []  Tube feeding    [x]  Removable BATHROOM EQUIPMENT  []  PICC line care      Foot/Leg Rests  []  Bedside commode  []  Renae catheter care    [x]  Removable  []  Extra wide bedside commode  []  Other:     [x]  Elevating  []  3:1 commode  []  Medical Social Worker      Other  []  3:1 commode WITH drop arms  []  Aide    []  Brake extensions  []  Tub transfer bench     []  Padded gloves  []  Tub chair     []  Antitippers  []  Other:          CUSHIONS OTHER     []  Foam cushion  []  Seat belt     [x]  Gel cushion  [x]  Gait belt []  Ta Steel  [x]  Transfer board - Size: 32 inches     []  Roho  []  Oxygen     []  Other  []  CPM      []  [address]  [x]  Ramp     []  Hospital bed  []  Hip kit     []  Special mattress  []  Reacher     []  Trapeze bar       Preferred Local Pharmacy (not mail-order): [facility]    Physical Therapy Callie Chaudhry, MPT   Occupational Therapy    Speech-Language Therapy    Social Work Elza Boss MSW   Nursing

## 2020-01-07 NOTE — PROGRESS NOTES
Problem: Diabetes Self-Management  Goal: *Disease process and treatment process  Description  Define diabetes and identify own type of diabetes; list 3 options for treating diabetes. Outcome: Progressing Towards Goal  Goal: *Incorporating nutritional management into lifestyle  Description  Describe effect of type, amount and timing of food on blood glucose; list 3 methods for planning meals. Outcome: Progressing Towards Goal  Goal: *Incorporating physical activity into lifestyle  Description  State effect of exercise on blood glucose levels. Outcome: Progressing Towards Goal  Goal: *Developing strategies to promote health/change behavior  Description  Define the ABC's of diabetes; identify appropriate screenings, schedule and personal plan for screenings. Outcome: Progressing Towards Goal  Goal: *Using medications safely  Description  State effect of diabetes medications on diabetes; name diabetes medication taking, action and side effects. Outcome: Progressing Towards Goal  Goal: *Monitoring blood glucose, interpreting and using results  Description  Identify recommended blood glucose targets  and personal targets. Outcome: Progressing Towards Goal  Goal: *Prevention, detection, treatment of acute complications  Description  List symptoms of hyper- and hypoglycemia; describe how to treat low blood sugar and actions for lowering  high blood glucose level. Outcome: Progressing Towards Goal  Goal: *Prevention, detection and treatment of chronic complications  Description  Define the natural course of diabetes and describe the relationship of blood glucose levels to long term complications of diabetes.   Outcome: Progressing Towards Goal  Goal: *Developing strategies to address psychosocial issues  Description  Describe feelings about living with diabetes; identify support needed and support network  Outcome: Progressing Towards Goal  Goal: *Insulin pump training  Outcome: Progressing Towards Goal  Goal: *Sick day guidelines  Outcome: Progressing Towards Goal  Goal: *Patient Specific Goal (EDIT GOAL, INSERT TEXT)  Outcome: Progressing Towards Goal     Problem: Patient Education: Go to Patient Education Activity  Goal: Patient/Family Education  Outcome: Progressing Towards Goal     Problem: Falls - Risk of  Goal: *Absence of Falls  Description  Document Zac Anderson Fall Risk and appropriate interventions in the flowsheet. Outcome: Progressing Towards Goal  Note: Fall Risk Interventions:  Mobility Interventions: Assess mobility with egress test, Bed/chair exit alarm, Patient to call before getting OOB, Utilize walker, cane, or other assistive device    Mentation Interventions: Adequate sleep, hydration, pain control, Bed/chair exit alarm, Door open when patient unattended, Increase mobility, Room close to nurse's station    Medication Interventions: Bed/chair exit alarm, Patient to call before getting OOB, Teach patient to arise slowly    Elimination Interventions: Bed/chair exit alarm, Call light in reach, Patient to call for help with toileting needs, Toileting schedule/hourly rounds    History of Falls Interventions: Bed/chair exit alarm, Door open when patient unattended, Room close to nurse's station         Problem: Patient Education: Go to Patient Education Activity  Goal: Patient/Family Education  Outcome: Progressing Towards Goal     Problem: Pressure Injury - Risk of  Goal: *Prevention of pressure injury  Description  Document Florian Scale and appropriate interventions in the flowsheet.   Outcome: Progressing Towards Goal  Note: Pressure Injury Interventions:  Sensory Interventions: Assess changes in LOC, Check visual cues for pain, Pressure redistribution bed/mattress (bed type)    Moisture Interventions: Absorbent underpads, Check for incontinence Q2 hours and as needed, Offer toileting Q_hr, Moisture barrier    Activity Interventions: Increase time out of bed, Pressure redistribution bed/mattress(bed type), PT/OT evaluation    Mobility Interventions: HOB 30 degrees or less, Pressure redistribution bed/mattress (bed type), PT/OT evaluation    Nutrition Interventions: Document food/fluid/supplement intake    Friction and Shear Interventions: Apply protective barrier, creams and emollients, HOB 30 degrees or less                Problem: Patient Education: Go to Patient Education Activity  Goal: Patient/Family Education  Outcome: Progressing Towards Goal     Problem: Infection - Risk of, Surgical Site Infection  Goal: *Absence of surgical site infection signs and symptoms  Outcome: Progressing Towards Goal     Problem: Patient Education: Go to Patient Education Activity  Goal: Patient/Family Education  Outcome: Progressing Towards Goal     Problem: Pain  Goal: *Control of Pain  Outcome: Progressing Towards Goal  Goal: *PALLIATIVE CARE:  Alleviation of Pain  Outcome: Progressing Towards Goal     Problem: Patient Education: Go to Patient Education Activity  Goal: Patient/Family Education  Outcome: Progressing Towards Goal     Problem: Injury - Risk of, Adverse Drug Event  Goal: *Absence of adverse drug events  Outcome: Progressing Towards Goal  Goal: *Absence of medication errors  Outcome: Progressing Towards Goal  Goal: *Knowledge of prescribed medications  Outcome: Progressing Towards Goal     Problem: Patient Education: Go to Patient Education Activity  Goal: Patient/Family Education  Outcome: Progressing Towards Goal     Problem: Inpatient Rehab (Adult)  Goal: *LTG: Avoids injury/falls 100% of time related to deficits  Outcome: Progressing Towards Goal  Goal: *LTG: Avoids infection 100% of time related to deficits  Outcome: Progressing Towards Goal  Goal: *LTG: Verbalize understanding of diagnosis and risk factors for recurring stroke  Outcome: Progressing Towards Goal  Goal: *LTG: Absence of DVT during hospitalization  Outcome: Progressing Towards Goal  Goal: *LTG: Maintains Skin Integrity With No Evidence of Pressure Injury 100% of Time  Outcome: Progressing Towards Goal  Goal: Interventions  Outcome: Progressing Towards Goal     Problem: Patient Education: Go to Patient Education Activity  Goal: Patient/Family Education  Outcome: Progressing Towards Goal

## 2020-01-07 NOTE — PROGRESS NOTES
Problem: Self Care Deficits Care Plan (Adult)  Goal: *Therapy Goal (Edit Goal, Insert Text)  Description  Occupational Therapy Goals   Long Term Goals  Initiated 19 and to be accomplished within 4 week(s)  to facilitate increased self care independence and strength for return to PLOF and decreased care giver burden:   2. Pt will perform grooming with Mod I.   3. Pt will perform UB bathing with SBA. 4. Pt will perform LB bathing with Tyree. 5. Pt will perform tub/shower transfer <> TTB with Min A.   6. Pt will perform UB dressing with Set-up. 7. Pt will perform LB dressing with Min A.  8. Pt will perform toileting task with Min A.  9. Pt will perform toilet transfer with Min A/CGA. Short Term Goals   Initiated 19 and to be accomplished within 7 day(s) (2020) to facilitate increased self care independence and strength for return to PLOF and decreased care giver burden:   1. Pt will perform self-feeding with Mod I.   2. Pt will perform grooming with set-up. ()  3. Pt will perform UB bathing with Mod A. ()  4. Pt will perform LB bathing with Mod A in LRE. ()  5. Pt will perform tub/shower transfer <> TTB with MaxA x 1.-   6. Pt will perform UB dressing with SBA. -   7. Pt will perform LB dressing with Mod A using AE as needed. ()  8. Pt will perform toileting task with Max A x 1. ()  9. Pt will perform toilet transfer with MaxA x 1.- 20 UG Mod assist         Outcome Measures:  (19) Dynamometer  strength: Right= 27.33# (norm=55. 1#) Left= 34.67# (norm=45.7#)  (19) 9-hole peg test: Right=45.46 seconds ; Left=41.13 seconds         Outcome: Progressing Towards Goal  Goal: *Therapy Goal (Edit Goal, Insert Text)  Outcome: Progressing Towards Goal  Goal: Interventions  Outcome: Progressing Towards Goal  OCCUPATIONAL THERAPY TREATMENT    Patient: Candance Ralph Roots   62 y.o.     Patient identified with name and : yes    Date: 2020    First Tx Session  Time In: 130  Time Out[de-identified] 300      Diagnosis: Status post laminectomy with spinal fusion [Z98.1]   Precautions: Fall, Spinal(thoracic)  Chart, occupational therapy assessment, plan of care, and goals were reviewed. Pain:  Pt reports 10/10 pain or discomfort prior to treatment. Pt reports 10/10 pain or discomfort post treatment. Intervention Provided:Pt received pain med's prior to session      SUBJECTIVE:   Patient stated  It feel like something is stuck up my rectum. Can you help me I can't reach (for hygiene).     OBJECTIVE DATA SUMMARY:   Pt seen for toileting tasks,IADL's and UE strengthening. Pt bending sideways retrieving laundry bag off, than later c/o pain in back      THERAPEUTIC EXERCISE Daily Assessment    With unsupportive sitting pt performs chest press  3x10 reps with rest verbal cues to maintain upright posture. IADL Daily Assessment   ADL's ; Pt propelled self to sink washing hand after toileting tasks, than combing hair. Pt participated with laundry activity by  retrieving clothing from bag sitting in lap. Pt dropping / tossing clothing into washing machine, req'd assist with adding detergent and setting machine 2/2 decrease standing ability. TOILETING Daily Assessment    Toileting  Toileting Assistance (FIM Score): 2 (Maximal assistance)  Cues: Physical assistance for pants and brief down and up ;Verbal cues provided to assist with hygiene and CM, proving min assist.   Adaptive Equipment: Other (comment)     UPPER BODY DRESSING Daily Assessment     Pt req'd min assist doffing /donning jacket before and after toileting tasks. MOBILITY/TRANSFERS Daily Assessment    Functional Transfers  Toilet Transfer : Lateral with transfer board Other (comment)  Amount of Assistance Required: 3 (Moderate assistance) Pt inability to performs lateral transfer on board without pushing backward. Pt needed mod cuing to bring hips forward with lateral transfer.        ASSESSMENT: Pt demonstrates decrease independency with toileting tasks and poor motivation to be independent. Pt inability to challenge self, preferred asking for assist. Pt inability to follow lumbar precaution with therapeutic activity. Progression toward goals:  []          Improving appropriately and progressing toward goals  [x]          Improving slowly and progressing toward goals  []          Not making progress toward goals and plan of care will be adjusted     PLAN:  Patient continues to benefit from skilled intervention to address the above impairments. Continue treatment per established plan of care. Discharge Recommendations: Home Health   Further Equipment Recommendations for Discharge: 3:1 commode     Activity Tolerance:  Fair      Estimated LOS:1/14/20*    Please refer to the flow sheet for vital signs taken during this treatment. After treatment:   [x]  Patient left in no apparent distress sitting up in chair   []  Patient left in no apparent distress in bed  [x]  Call bell left within reach  []  Nursing notified  []  Caregiver present  []  Bed alarm activated    COMMUNICATION/EDUCATION:   [] Home safety education was provided and the patient/caregiver indicated understanding. [] Patient/family have participated as able in goal setting and plan of care. [x] Patient/family agree to work toward stated goals and plan of care. [] Patient understands intent and goals of therapy, but is neutral about his/her participation. [] Patient is unable to participate in goal setting and plan of care.       Jane FORREST  1/7/2020

## 2020-01-07 NOTE — PROGRESS NOTES
SHIFT CHANGE NOTE FOR [facility]    Bedside and Verbal shift change report given to [facility] (oncoming nurse) by Ana Fenton LPN (offgoing nurse). Report included the following information SBAR, Kardex, MAR and Recent Results.     Situation:   Code Status: Full Code   Reason for Admission: Spinal Thoracic laminectomy T 6-T [address] Day: 22   Problem List:   Hospital Problems  Date Reviewed: 1/6/2020          Codes Class Noted POA    Chronic respiratory failure with hypoxia (HCC) (Chronic) ICD-10-CM: J96.11  ICD-9-CM: 518.83, 799.02  Unknown Yes    Overview Signed 12/16/2019 10:11 PM by Meaghan Farley MD     On home oxygen inhalation at 3 LPM via NC             Opioid dependence ([facility] (Chronic) ICD-10-CM: F11.20  ICD-9-CM: 304.00  Unknown Yes    Overview Signed 12/16/2019 10:54 PM by Meaghan aFrley MD     On Methadone             Acute blood loss as cause of postoperative anemia ICD-10-CM: D62  ICD-9-CM: 285.1  12/12/2019 Yes        Impaired mobility and ADLs ICD-10-CM: Z74.09  ICD-9-CM: 799.89  12/11/2019 Yes        Spinal cord compression ([facility] ICD-10-CM: G95.20  ICD-9-CM: 336.9  12/11/2019 Yes        Compression fracture of body of thoracic vertebra (HCC) ICD-10-CM: S22.000A  ICD-9-CM: 805.2  12/11/2019 Yes        * (Principal) Status post laminectomy with spinal fusion ICD-10-CM: Z98.1  ICD-9-CM: V45.4  12/11/2019 Yes    Overview Signed 12/16/2019 10:05 PM by Meaghan Farley MD     S/P T10, T9, T8 laminectomy; T7, T8, T9, T10, T11, T12 posterior thoracic fusion; segmental spinal instrumentation; K2M Davis type, T7-T8, T11-T12 (12/11/2019 - Dr. Wilver Quinteros)             History of fracture due to fall ICD-10-CM: Z87.81  ICD-9-CM: V15.51  12/11/2019 Yes        Hypoxemia requiring supplemental oxygen (Chronic) ICD-10-CM: R09.02, Z99.81  ICD-9-CM: 799.02  12/29/2014 Yes        Chronic obstructive pulmonary disease (COPD) (HCC) (Chronic) ICD-10-CM: J44.9  ICD-9-CM: 496  Unknown Yes    Overview Signed 4/23/2013 10:01 AM by Abhijeet Carter     SOB, on paula O2  Recent admission with psychosis             Hypertensive heart disease without heart failure (Chronic) ICD-10-CM: I11.9  ICD-9-CM: 402.90  Unknown Yes    Overview Signed 4/23/2013 10:02 AM by Nicole CALLES     Better controlled             Type 2 diabetes mellitus with diabetic neuropathy, with long-term current use of insulin ([facility] [address] (Chronic) ICD-10-CM: E11.40, Z79.4  ICD-9-CM: 250.60, 357.2, V58.67  Unknown Yes    Overview Signed 12/18/2019  2:13 PM by Beth Nunez MD     HbA1c (12/11/2019) = 7.1                   Background:   Past Medical History:   Past Medical History:   Diagnosis Date    Abnormally low high density lipoprotein (HDL) cholesterol with hypertriglyceridemia     Lipid profile (11/6/2016) showed , , HDL 38, LDL 37    Acute blood loss as cause of postoperative anemia 12/12/2019    Anxiety     Bipolar affective disorder ([facility] 12/5/2012    Cellulitis of right forearm 05/04/2017    Chronic back pain     Chronic obstructive pulmonary disease (COPD) (HCC)     SOB, on paula O2 Recent admission with psychosis     Chronic respiratory failure with hypoxia (HCC)     On home oxygen inhalation at 3 LPM via NC    Contusion of left elbow 5/4/2017    Depression     Diabetic neuropathy associated with type 2 diabetes mellitus (HCC)     Diastolic dysfunction without heart failure     Stable on diuretics     Falls frequently     Gastroesophageal reflux disease with hiatal hernia     Generalized osteoarthritis of multiple sites     Gout     On Allopurinol    History of acute renal failure 5/31/2013    History of back injury     jumped out of second story window     History of fracture due to fall 12/11/2019    History of hepatitis C     treated    History of penicillin allergy     History of vitamin D deficiency 5/10/2017    Hypertensive heart disease without heart failure     Better controlled     Hyperuricemia 5/26/2017    Hypothyroidism     Hypoxemia requiring supplemental oxygen 12/29/2014    Intravenous drug user 5/2/2017    Long term current use of aspirin     Memory difficulty     Menopause     Mixed connective tissue disease ([facility] [address] 5/10/2017    Obesity, Class I, BMI 30-34.9     Olecranon bursitis of right elbow 5/4/2017    [facility]     On Methadone    Recurrent genital herpes 5/31/2013    Right Achilles tendinitis 5/2/2017    Sarcoidosis     Sickle cell trait ([facility]     Tobacco use disorder     Type 2 diabetes mellitus with diabetic neuropathy, with long-term current use of insulin ([facility])     HbA1c (12/11/2019) = 7.1    Urge urinary incontinence 5/10/2017    Venous insufficiency     Wears glasses       Patient taking anticoagulants no    Patient has a defibrillator: no     Assessment:   Changes in Assessment throughout shift: None     Patient has central line: no Reasons if yes: Removed 12/16/19  Insertion date:n/a Last dressing date:n/a   Patient has Renae Cath: no Reasons if yes: n/a   Insertion date:n/a     Last Vitals:     Vitals:    01/06/20 0740 01/06/20 1550 01/06/20 2100 01/07/20 0803   BP: 115/78 116/66 132/71 111/65   Pulse: 67 74 89 73   Resp: 18 16 20 18   Temp: 98.9 °F (37.2 °C) 97.6 °F (36.4 °C) 98.2 °F (36.8 °C) 97.9 °F (36.6 °C)   SpO2: 100% 99% 96% 96%   Weight:       LMP: 11/25/2012        PAIN    Pain Assessment    Pain Intensity 1: 3 (01/07/20 0810) Pain Intensity 1: 2 (12/29/14 1105)    Pain Location 1: Back Pain Location 1: Abdomen    Pain Intervention(s) 1: Medication (see MAR) Pain Intervention(s) 1: Medication (see MAR)  Patient Stated Pain Goal: 0 Patient Stated Pain Goal: 0  o Intervention effective: yes    o Other actions taken for pain: no c/o     Skin Assessment  Skin color Skin Color: Appropriate for ethnicity  Condition/Temperature Skin Condition/Temp: Warm  Integrity Skin Integrity: Incision (comment)  Turgor Turgor: Non-tenting  Weekly Pressure Ulcer Documentation  Pressure  Injury Documentation: No Pressure Injury Noted-Pressure Ulcer Prevention Initiated  Wound Prevention & Protection Methods  Orientation of wound Orientation of Wound Prevention: Posterior  Location of Prevention Location of Wound Prevention: Sacrum/Coccyx  Dressing Present Dressing Present : No  Dressing Status Dressing Status: Intact  Wound Offloading Wound Offloading (Prevention Methods): Bed, pressure redistribution/air, Bed, pressure reduction mattress     INTAKE/OUPUT  Date 01/06/20 0700 - 01/07/20 0659 01/07/20 0700 - 01/08/20 0659   Shift 0700-1859 1900-0659 24 Hour Total 0700-1859 1900-0659 24 Hour Total   INTAKE   Shift Total(mL/kg)         OUTPUT   Urine(mL/kg/hr)           Urine Occurrence(s) 1 x 3 x 4 x 0 x  0 x   Stool           Stool Occurrence(s) 0 x 0 x 0 x 0 x  0 x   Shift Total(mL/kg)         NET         Weight (kg) 78.2 78.2 78.2 78.2 78.2 78.2       Recommendations:  1. Patient needs and requests: met    2. Diet: Cardiac DM Reg /thin liq    3. Pending tests/procedures: none     4. Functional Level/Equipment: wheelchair    5. Estimated Discharge Date: TBD Posted on Whiteboard in Patients Room: yes     HEALS Safety Check    A safety check occurred in the patient's room between off going nurse and oncoming nurse listed above.     The safety check included the below items  Area Items   H  High Alert Medications - Verify all high alert medication drips (heparin, PCA, etc.)   E  Equipment - Suction is set up for ALL patients (with yanker)  - Red plugs utilized for all equipment (IV pumps, etc.)  - WOWs wiped down at end of shift.  - Room stocked with oxygen, suction, and other unit-specific supplies   A  Alarms - Bed alarm is set for fall risk patients  - Ensure chair alarm is in place and activated if patient is up in a chair   L  Lines - Check IV for any infiltration  - Renae bag is empty if patient has a Renae   - Tubing and IV bags are labeled   S  Safety   - Room is clean, patient is clean, and equipment is clean. - Hallways are clear from equipment besides carts. - Fall bracelet on for fall risk patients  - Ensure room is clear and free of clutter  - Suction is set up for ALL patients (with lashay)  - Hallways are clear from equipment besides carts.    - Isolation precautions followed, supplies available outside room, sign posted

## 2020-01-07 NOTE — PROGRESS NOTES
SHIFT CHANGE NOTE FOR [facility]    Bedside and Verbal shift change report given to Jovana Corrigan LPN (oncoming nurse) by Rhea Chowdary (offgoing nurse). Report included the following information SBAR, Kardex, MAR and Recent Results.     Situation:   Code Status: Full Code   Reason for Admission: Spinal Thoracic laminectomy T 6-T [address] Day: 22   Problem List:   Hospital Problems  Date Reviewed: 1/6/2020          Codes Class Noted POA    Chronic respiratory failure with hypoxia (HCC) (Chronic) ICD-10-CM: J96.11  ICD-9-CM: 518.83, 799.02  Unknown Yes    Overview Signed 12/16/2019 10:11 PM by Mingo Woods MD     On home oxygen inhalation at 3 LPM via NC             Opioid dependence ([facility] [address] (Chronic) ICD-10-CM: F11.20  ICD-9-CM: 304.00  Unknown Yes    Overview Signed 12/16/2019 10:54 PM by Mingo Woods MD     On Methadone             Acute blood loss as cause of postoperative anemia ICD-10-CM: D62  ICD-9-CM: 285.1  12/12/2019 Yes        Impaired mobility and ADLs ICD-10-CM: Z74.09  ICD-9-CM: 799.89  12/11/2019 Yes        Spinal cord compression ([facility] [address] ICD-10-CM: G95.20  ICD-9-CM: 336.9  12/11/2019 Yes        Compression fracture of body of thoracic vertebra (HCC) ICD-10-CM: S22.000A  ICD-9-CM: 805.2  12/11/2019 Yes        * (Principal) Status post laminectomy with spinal fusion ICD-10-CM: Z98.1  ICD-9-CM: V45.4  12/11/2019 Yes    Overview Signed 12/16/2019 10:05 PM by Mingo Woods MD     S/P T10, T9, T8 laminectomy; T7, T8, T9, T10, T11, T12 posterior thoracic fusion; segmental spinal instrumentation; K2M Davis type, T7-T8, T11-T12 (12/11/2019 - Dr. Shruti Blue)             History of fracture due to fall ICD-10-CM: Z87.81  ICD-9-CM: V15.51  12/11/2019 Yes        Hypoxemia requiring supplemental oxygen (Chronic) ICD-10-CM: R09.02, Z99.81  ICD-9-CM: 799.02  12/29/2014 Yes        Chronic obstructive pulmonary disease (COPD) (HCC) (Chronic) ICD-10-CM: J44.9  ICD-9-CM: 496  Unknown Yes    Overview Signed 4/23/2013 10:01 AM by Екатерина Orellana     SOB, on paula O2  Recent admission with psychosis             Hypertensive heart disease without heart failure (Chronic) ICD-10-CM: I11.9  ICD-9-CM: 402.90  Unknown Yes    Overview Signed 4/23/2013 10:02 AM by Clara CALLES     Better controlled             Type 2 diabetes mellitus with diabetic neuropathy, with long-term current use of insulin ([facility] [address] (Chronic) ICD-10-CM: E11.40, Z79.4  ICD-9-CM: 250.60, 357.2, V58.67  Unknown Yes    Overview Signed 12/18/2019  2:13 PM by Ana María Lowe MD     HbA1c (12/11/2019) = 7.1                   Background:   Past Medical History:   Past Medical History:   Diagnosis Date    Abnormally low high density lipoprotein (HDL) cholesterol with hypertriglyceridemia     Lipid profile (11/6/2016) showed , , HDL 38, LDL 37    Acute blood loss as cause of postoperative anemia 12/12/2019    Anxiety     Bipolar affective disorder ([facility] [address] 12/5/2012    Cellulitis of right forearm 05/04/2017    Chronic back pain     Chronic obstructive pulmonary disease (COPD) (HCC)     SOB, on paula O2 Recent admission with psychosis     Chronic respiratory failure with hypoxia (HCC)     On home oxygen inhalation at 3 LPM via NC    Contusion of left elbow 5/4/2017    Depression     Diabetic neuropathy associated with type 2 diabetes mellitus (HCC)     Diastolic dysfunction without heart failure     Stable on diuretics     Falls frequently     Gastroesophageal reflux disease with hiatal hernia     Generalized osteoarthritis of multiple sites     Gout     On Allopurinol    History of acute renal failure 5/31/2013    History of back injury     jumped out of second story window     History of fracture due to fall 12/11/2019    History of hepatitis C     treated    History of penicillin allergy     History of vitamin D deficiency 5/10/2017    Hypertensive heart disease without heart failure     Better controlled     Hyperuricemia 5/26/2017    Hypothyroidism     Hypoxemia requiring supplemental oxygen 12/29/2014    Intravenous drug user 5/2/2017    Long term current use of aspirin     Memory difficulty     Menopause     Mixed connective tissue disease ([facility] [address] 5/10/2017    Obesity, Class I, BMI 30-34.9     Olecranon bursitis of right elbow 5/4/2017    [facility]     On Methadone    Recurrent genital herpes 5/31/2013    Right Achilles tendinitis 5/2/2017    Sarcoidosis     Sickle cell trait ([facility]     Tobacco use disorder     Type 2 diabetes mellitus with diabetic neuropathy, with long-term current use of insulin ([facility])     HbA1c (12/11/2019) = 7.1    Urge urinary incontinence 5/10/2017    Venous insufficiency     Wears glasses       Patient taking anticoagulants no    Patient has a defibrillator: no     Assessment:   Changes in Assessment throughout shift: None     Patient has central line: no Reasons if yes: Removed 12/16/19  Insertion date:n/a Last dressing date:n/a   Patient has Renae Cath: no Reasons if yes: n/a   Insertion date:n/a     Last Vitals:     Vitals:    01/05/20 2146 01/06/20 0740 01/06/20 1550 01/06/20 2100   BP: 147/78 115/78 116/66 132/71   Pulse: 70 67 74 89   Resp: 18 18 16 20   Temp: 97 °F (36.1 °C) 98.9 °F (37.2 °C) 97.6 °F (36.4 °C) 98.2 °F (36.8 °C)   SpO2: 99% 100% 99% 96%   Weight:       LMP: 11/25/2012        PAIN    Pain Assessment    Pain Intensity 1: 0 (01/07/20 0412) Pain Intensity 1: 2 (12/29/14 1105)    Pain Location 1: Back Pain Location 1: Abdomen    Pain Intervention(s) 1: Medication (see MAR) Pain Intervention(s) 1: Medication (see MAR)  Patient Stated Pain Goal: 0 Patient Stated Pain Goal: 0  o Intervention effective: yes    o Other actions taken for pain: no c/o     Skin Assessment  Skin color Skin Color: Appropriate for ethnicity  Condition/Temperature Skin Condition/Temp: Warm  Integrity Skin Integrity: Incision (comment)  Turgor Turgor: Non-tenting  Weekly Pressure Ulcer Documentation  Pressure  Injury Documentation: No Pressure Injury Noted-Pressure Ulcer Prevention Initiated  Wound Prevention & Protection Methods  Orientation of wound Orientation of Wound Prevention: Posterior  Location of Prevention Location of Wound Prevention: Sacrum/Coccyx  Dressing Present Dressing Present : No  Dressing Status Dressing Status: Intact  Wound Offloading Wound Offloading (Prevention Methods): Bed, pressure redistribution/air, Bed, pressure reduction mattress     INTAKE/OUPUT  Date 01/06/20 0700 - 01/07/20 0659 01/07/20 0700 - 01/08/20 0659   Shift 0700-1859 1900-0659 24 Hour Total 0700-1859 1900-0659 24 Hour Total   INTAKE   Shift Total(mL/kg)         OUTPUT   Urine(mL/kg/hr)           Urine Occurrence(s) 1 x 2 x 3 x      Stool           Stool Occurrence(s) 0 x 0 x 0 x      Shift Total(mL/kg)         NET         Weight (kg) 78.2 78.2 78.2 78.2 78.2 78.2       Recommendations:  1. Patient needs and requests: met    2. Diet: Cardiac DM Reg /thin liq    3. Pending tests/procedures: none     4. Functional Level/Equipment: wheelchair    5. Estimated Discharge Date: TBD Posted on Whiteboard in Patients Room: yes     HEALS Safety Check    A safety check occurred in the patient's room between off going nurse and oncoming nurse listed above.     The safety check included the below items  Area Items   H  High Alert Medications - Verify all high alert medication drips (heparin, PCA, etc.)   E  Equipment - Suction is set up for ALL patients (with yanker)  - Red plugs utilized for all equipment (IV pumps, etc.)  - WOWs wiped down at end of shift.  - Room stocked with oxygen, suction, and other unit-specific supplies   A  Alarms - Bed alarm is set for fall risk patients  - Ensure chair alarm is in place and activated if patient is up in a chair   L  Lines - Check IV for any infiltration  - Renae bag is empty if patient has a Renae   - Tubing and IV bags are labeled   S  Safety   - Room is clean, patient is clean, and equipment is clean. - Hallways are clear from equipment besides carts. - Fall bracelet on for fall risk patients  - Ensure room is clear and free of clutter  - Suction is set up for ALL patients (with lashay)  - Hallways are clear from equipment besides carts.    - Isolation precautions followed, supplies available outside room, sign posted

## 2020-01-07 NOTE — PROGRESS NOTES
[facility] PHYSICAL REHABILITATION  [address] [place] [address], [address]     INPATIENT REHABILITATION  DAILY PROGRESS NOTE     Date: 1/7/2020    Name: Hasmukh Molina Age / Sex: 62 y.o. / female   CSN: [id number] MRN: [id number]   [facility] Date: 12/16/2019 Length of Stay: 22 days     Primary Rehab Diagnosis: Impaired Mobility and ADLs secondary to:  1. S/P T10, T9, T8 laminectomy; T7, T8, T9, T10, T11, T12 posterior thoracic fusion; segmental spinal instrumentation; K2M [place] type, T7-T8, T11-T12 (12/11/2019 - Dr. Savi Novak)  2. History of acute compression fractures of body of thoracic vertebrae (T9 and T10) due to fall      Subjective:     Patient seen and examined. Blood pressure controlled. Blood glucose controlled. Patient's Complaint:   No significant medical complaints    Pain Control: stable, mild-to-moderate joint symptoms intermittently, reasonably well controlled by current meds      Objective:     Vital Signs:  Patient Vitals for the past 24 hrs:   BP Temp Pulse Resp SpO2   01/07/20 0803 111/65 97.9 °F (36.6 °C) 73 18 96 %   01/06/20 2100 132/71 98.2 °F (36.8 °C) 89 20 96 %   01/06/20 1550 116/66 97.6 °F (36.4 °C) 74 16 99 %        Physical Examination:  GENERAL SURVEY: Patient is awake, alert, oriented x 3, sitting comfortably on the chair, not in acute respiratory distress. HEENT: pale palpebral conjunctivae, anicteric sclerae, no nasoaural discharge, moist oral mucosa  NECK: supple, no jugular venous distention, no palpable lymph nodes  CHEST/LUNGS: symmetrical chest expansion, good air entry, clear breath sounds  HEART: adynamic precordium, good S1 S2, no S3, regular rhythm, no murmurs  ABDOMEN: obese, bowel sounds appreciated, soft, non-tender  EXTREMITIES: pale nailbeds, no edema, full and equal pulses, no calf tenderness   NEUROLOGICAL EXAM: The patient is awake, alert and oriented x3, able to answer questions fairly appropriately, able to follow 1 and 2 step commands.   Able to tell time from the wall clock. Cranial nerves II-XII are grossly intact. No gross sensory deficit. Motor strength is 4-/5 on BUE, 2+/5 on the RLE (except for 1/5 on the right ankle), 2-/5 on the LLE (except for 2+/5 on the left knee).      Incision(s): healing well, clean, dry, and intact and serosanguineous       Current Medications:  Current Facility-Administered Medications   Medication Dose Route Frequency    busPIRone (BUSPAR) tablet 5 mg  5 mg Oral TID    pantoprazole (PROTONIX) tablet 40 mg  40 mg Oral ACB    calcium carbonate (TUMS) chewable tablet 200 mg [elemental]  200 mg Oral TID PRN    insulin glargine (LANTUS) injection 25 Units  25 Units SubCUTAneous ACB    insulin lispro (HUMALOG) injection 4 Units  4 Units SubCUTAneous TIDAC    lubiPROStone (AMITIZA) capsule 24 mcg  24 mcg Oral DAILY WITH BREAKFAST    guaiFENesin ER (MUCINEX) tablet 600 mg  600 mg Oral Q12H    acetaminophen (TYLENOL) tablet 650 mg  650 mg Oral Q4H PRN    bisacodyl (DULCOLAX) tablet 10 mg  10 mg Oral Q48H PRN    ferrous sulfate tablet 325 mg  1 Tab Oral DAILY WITH BREAKFAST    ascorbic acid (vitamin C) (VITAMIN C) tablet 250 mg  250 mg Oral DAILY WITH BREAKFAST    insulin lispro (HUMALOG) injection   SubCUTAneous TIDAC    senna-docusate (PERICOLACE) 8.6-50 mg per tablet 2 Tab  2 Tab Oral PCD    methadone (DOLOPHINE) tablet 20 mg  20 mg Oral DAILY    oxyCODONE-acetaminophen (PERCOCET 10)  mg per tablet 1 Tab  1 Tab Oral Q4H PRN    predniSONE (DELTASONE) tablet 30 mg  30 mg Oral DAILY WITH BREAKFAST    rosuvastatin (CRESTOR) tablet 5 mg  5 mg Oral QHS    albuterol (PROVENTIL VENTOLIN) nebulizer solution 2.5 mg  2.5 mg Nebulization Q4H PRN    levothyroxine (SYNTHROID) tablet 75 mcg  75 mcg Oral 6am    cholecalciferol (VITAMIN D3) (400 Units /10 mcg) tablet 5 Tab  2,000 Units Oral DAILY    calcium citrate tablet 200 mg [elemental]  200 mg Oral BID    divalproex DR (DEPAKOTE) tablet 500 mg  500 mg Oral QHS  fluticasone-vilanterol (BREO ELLIPTA) 100mcg-25mcg/puff  1 Puff Inhalation DAILY    oxybutynin (DITROPAN) tablet 5 mg  5 mg Oral BID    allopurinol (ZYLOPRIM) tablet 100 mg  100 mg Oral DAILY    sertraline (ZOLOFT) tablet 50 mg  50 mg Oral DAILY    methocarbamol (ROBAXIN) tablet 500 mg  500 mg Oral Q8H    aspirin chewable tablet 81 mg  81 mg Oral DAILY WITH BREAKFAST    docusate sodium (COLACE) capsule 100 mg  100 mg Oral DAILY AFTER BREAKFAST       Allergies:   Allergies   Allergen Reactions    Moxifloxacin Rash    Pcn [Penicillins] Swelling    Sulfa (Sulfonamide Antibiotics) Swelling       Functional Progress:    OCCUPATIONAL THERAPY    ON ADMISSION MOST RECENT   Eating  Functional Level: 5   Eating  Functional Level: 5     Grooming  Functional Level: 4   Grooming  Functional Level: 4     Bathing  Functional Level: 2   Bathing  Functional Level: 2     Upper Body Dressing  Functional Level: 3   Upper Body Dressing  Functional Level: 3     Lower Body Dressing  Functional Level: 1   Lower Body Dressing  Functional Level: 1     Toileting  Functional Level: 1   Toileting  Functional Level: 1     Toilet Transfers  Toilet Transfer Score: 0   Toilet Transfers  Toilet Transfer Score: 1     Tub /Shower Transfers  Tub/Shower Transfer Score: 0   Tub/Shower Transfers  Tub/Shower Transfer Score: 0       Legend:   7 - Independent   6 - Modified Independent   5 - Standby Assistance / Supervision / Set-up   4 - Minimum Assistance / Contact Guard Assistance   3 - Moderate Assistance   2 - Maximum Assistance   1 - Total Assistance / Dependent       Lab/Data Review:  Recent Results (from the past 24 hour(s))   GLUCOSE, POC    Collection Time: 01/06/20  4:42 PM   Result Value Ref Range    Glucose (POC) 287 (H) 70 - 110 mg/dL   GLUCOSE, POC    Collection Time: 01/06/20  8:46 PM   Result Value Ref Range    Glucose (POC) 162 (H) 70 - 110 mg/dL   GLUCOSE, POC    Collection Time: 01/07/20  7:32 AM   Result Value Ref Range Glucose (POC) 139 (H) 70 - 110 mg/dL   GLUCOSE, POC    Collection Time: 01/07/20 11:23 AM   Result Value Ref Range    Glucose (POC) 89 70 - 110 mg/dL       Estimated Glomerular Filtration Rate:  On admission, estimated GFR based on a Creatinine of 0.68 mg/dl:              Using CKD-EPI = 107.9 mL/min/1.73m2              Using MDRD = 112.4 mL/min/1.73m2  Most recent estimated GFR, based on a Creatinine of 0.84 mg/dl on 1/6/2020:   Using CKD-EPI = 85.7 mL/min/1.73m2   Using MDRD = 88.8 mL/min/1.73m2       Assessment:     Primary Rehabilitation Diagnosis  1. Impaired Mobility and ADLs  2. S/P T10, T9, T8 laminectomy; T7, T8, T9, T10, T11, T12 posterior thoracic fusion; segmental spinal instrumentation; K2M [place] type, T7-T8, T11-T12 (12/11/2019 - Dr. Nguyen Farley)  3.  History of acute compression fractures of body of thoracic vertebrae (T9 and T10) due to fall     Comorbidities   Diabetic neuropathy associated with type 2 diabetes mellitus (HCC) E11.40    Venous insufficiency I87.2    Obesity, Class I, BMI 30-34.9 E66.9    Chronic back pain M54.9, G89.29    Generalized osteoarthritis of multiple sites M15.9    Bipolar affective disorder  F31.9    Abnormally low high density lipoprotein (HDL) cholesterol with hypertriglyceridemia E78.6, X10.3    Diastolic dysfunction without heart failure I51.89    Chronic obstructive pulmonary disease (COPD)  J44.9    Hypertensive heart disease without heart failure I11.9    Depression F32.9    History of back injury Z87.828    Type 2 diabetes mellitus with diabetic neuropathy, with long-term current use of insulin  E11.40, Z79.4    Anxiety F41.9    Wears glasses Z97.3    History of hepatitis C Z86.19    Sickle cell trait D57.3    History of acute renal failure Z87.448    Hypothyroidism E03.9    Recurrent genital herpes A60.00    Sarcoidosis D86.9    Abscess of right arm L02.413    Hypoxemia requiring supplemental oxygen R09.02, Z99.81    Abnormal nuclear stress test R94.39    Cellulitis and abscess of hand L03.119, L02.519    Cellulitis and abscess of foot L03.119, L02.619    Cellulitis of arm, right L03. 113    Olecranon bursitis of right elbow M70.21    Contusion of left elbow S50. 02XA    Intravenous drug user F19.90    Right Achilles tendinitis M76.61    History of penicillin allergy Z88.0    Falls frequently R29.6    Gastroesophageal reflux disease with hiatal hernia K21.9, K44.9    Memory difficulty R41.3    Mixed connective tissue disease  M35.1    Hyperuricemia E79.0    History of vitamin D deficiency Z86.39    Urge urinary incontinence N39.41    Acute blood loss as cause of postoperative anemia D62    History of fracture due to fall Z87.81    Chronic respiratory failure with hypoxia  J96.11    Cellulitis of right forearm L03. 113    Gout M10.9    Menopause Z78.0    Long term current use of aspirin Z79.82    Opioid dependence  F11.20    Tobacco use disorder F17.200        Plan:     1. Justification for continued stay: Good progression towards established rehabilitation goals. 2. Medical Issues being followed closely:    [x]  Fall and safety precautions     []  Wound Care     [x]  Bowel and Bladder Function     [x]  Fluid Electrolyte and Nutrition Balance     []  Pain Control      3. Issues that 24 hour rehabilitation nursing is following:    [x]  Fall and safety precautions     []  Wound Care     [x]  Bowel and Bladder Function     [x]  Fluid Electrolyte and Nutrition Balance     []  Pain Control      [x]  Assistance with and education on in-room safety with transfers to and from the bed, wheelchair, toilet and shower. 4. Acute rehabilitation plan of care:    [x]  Continue current care and rehab. [x]  Physical Therapy           [x]  Occupational Therapy           []  Speech Therapy     []  Hold Rehab until further notice     5. Medications:    [x]  MAR Reviewed     [x]  Continue Present Medications     6.  DVT Prophylaxis:      []  Lovenox     []  Unfractionated Heparin     []  Coumadin     []  NOAC     [x]  ROBERT Stockings     []  Sequential Compression Device     []  None     7. Orders:   > S/P T10, T9, T8 laminectomy; T7, T8, T9, T10, T11, T12 posterior thoracic fusion; segmental spinal instrumentation; K2M [place] type, T7-T8, T11-T12 (12/11/2019 - Dr. Rigoberto Morales);  History of acute compression fractures of body of thoracic vertebrae (T9 and T10) due to fall   > Thoracic spinal precautions   > Continue:    > Calcium citrate 200 mg PO BID    > Cholecalciferol 2,000 units PO once daily    > Acute Postoperative Blood Loss Anemia   > Anemia work-up (12/13/2019) showed serum iron 11, TIBC 215, iron % saturation 5, ferritin 146   > Hgb/Hct (12/17/2019, on admission) = 7.4/23.1   > Reticulocyte count (12/17/2019) = 2.5   > Hgb/Hct (12/23/2019) = 8.5/26.7    > Continue:    > FeSO4 325 mg PO once daily with breakfast     > Ascorbic Acid 250 mg PO once daily with breakfast (to enhance the absorption of the FeSO4)     > Benign hypertensive heart and kidney disease with stage 3 chronic kidney disease without congestive heart failure   > Prior to admission, the patient stated she was on Metolazone 2.5 mg PO q Mon-Wed-Fri   > Metolazone was not given during the patient's hospital stay at [facility]    > Upon admission to the ARU, held Metolazone     > Chronic obstructive pulmonary disease; Chronic respiratory failure with hypoxemia requiring supplemental oxygen (3 LPM)   > Continue:    > Fluticasone-Vilanterol 100-25 1 puff inhaled once daily    > O2 inhalation at 3 LPM via nasal cannula continuous    > Opioid dependence   > Continue Methadone 20 mg PO once daily    > Type 2 diabetes mellitus with stage 3 chronic kidney disease   > HbA1c (12/11/2019) = 7.1   > On 12/22/2019:    > Increased Insulin glargine from 20 units to 22 units PO once daily before breakfast    > Started Insulin lispro 3 units SC TID AC    > On 12/24/2019, increased Insulin lispro from 3 units to 4 units SC TID AC    > On 12/25/2019, increased Insulin glargine from 22 units to 25 units PO once daily before breakfast   > Continue:    > Insulin glargine 25 units PO once daily before breakfast    > Insulin lispro 4 units SC TID AC     > Insulin lispro sliding scale SC TID AC only    > Cough   > On 12/18/2019, started Guaifenesin  mg PO q 12 hr   > Continue Guaifenesin  mg PO q 12 hr    > Constipation   > On 12/17/2019, started:    > Docusate sodium 100 mg PO once daily after breakfast    > PeriColace 2 tabs PO once daily after dinner   > On 12/22/2019, started Lubiprostone 24 mcg PO once daily with breakfast   > Continue:    > Docusate sodium 100 mg PO once daily after breakfast    > PeriColace 2 tabs PO once daily after dinner    > Lubiprostone 24 mcg PO once daily with breakfast    > Gastroesophageal reflux disease   > On 1/2/2019, Famotidine 20 mg PO BID was changed to Pantoprazole 40 mg PO once daily before breakfast   > Continue Pantoprazole 40 mg PO once daily before breakfast    > Anxiety   > On 1/1/2020, started Buspirone 5 mg PO TID   > Continue Buspirone 5 mg PO TID    > Analgesia   > Continue:    > Acetaminophen 650 mg PO q 4 hr PRN for pain level less than 5/10    > Methocarbamol 500 mg PO q 8 hr    > Percocet 10/325 1 tab PO q 4 hr PRN for pain level greater than 4/10     > Diet:   > Specifications: Cardiac, diabetic consistent carb 1800 kcal, no concentrated sweets, low purine   > Solids (consistency): Regular    > Liquids (consistency): Thin       8. Patient's progress in rehabilitation and medical issues discussed with the patient. All questions answered to the best of my ability. Care plan discussed with patient and nurse.       Signed:    Paulo Abernathy MD    January 7, 2020

## 2020-01-07 NOTE — PROGRESS NOTES
Problem: Mobility Impaired (Adult and Pediatric)  Goal: *Therapy Goal (Edit Goal, Insert Text)  Description  Physical Therapy Goals:  STG= LTG  Initiated 2019, revised 2020 and to be accomplished within 28 day(s) on 2020:   1. Patient will move from supine to sit and sit to supine with min A, and roll side to side in bed with contact guard assist.   2.  Patient will transfer from bed to chair and chair to bed with contact guard assist using lateral transfer board, equal heights; and min A with incline to higher surface (24\" height bed)  3. Patient will perform sit to stand with moderate assistance and least restrictive device. 4.  Patient will perform car transfer with lateral transfer board and min A.   5.  Patient will perform wheelchair mobility over even surfaces at mod independent level for at least 150 ft, including negotiation of doorways. 6.  Patient will perform wheelchair mobility up/down ramp, uneven sidewalk min A level. Outcome: Progressing Towards Goal   PHYSICAL THERAPY TREATMENT    Patient: Azam Sinha (65 y.o. female)  Date: 2020  Diagnosis: Status post laminectomy with spinal fusion [Z98.1] Status post laminectomy with spinal fusion  Precautions: Fall, Spinal(thoracic)  Chart, physical therapy assessment, plan of care and goals were reviewed. Time In: 0930  Time Out: 1100  Patient Seen For: Patient education;Transfer training; Therapeutic exercise; Wheelchair mobility  Pain:  Pt pain was reported as  10/10 in her back pre-treatment. Pt pain was reported as 10/10 in her back post-treatment. Intervention: pt had received scheduled pain meds prior to PT session    Patient identified with name and : yes    SUBJECTIVE:     Pt stated \"my feet are just burning in these new shoes I got\"  Pt's shoes changed and she noted improvement in the discomfort in her feet. Pt stated \"I need to use the restroom to move my bowels\" at the start of session.   Pt reports she is still trying to get someone to measure her bed at home to confirm height of bed. OBJECTIVE DATA SUMMARY:   Objective: first 45 mins of session, spent working on lateral slideboard transfer to/from bsc and w/c, wt shifting and raising bottom through w/c push up for clothing management and tyrell-care, and set-up of w/c to perform transfer to bsc. Pt required mod A to go to bsc from w/c, but min A to return to w/c from bsc. Pt then participated in w/c management training to be able to tell someone how to remove footrests from w/c to help with transfers. Followed by car transfer training with lateral transfer board, but car seat and w/c seat equal heights. Pt ended session with seated B LANCE WEEKS/PROM therex. TRANSFERS Daily Assessment    Transfer Type: Lateral with transfer board  Other: w/c <> bsc w/lateral transfer board  Transfer Assistance : 3 (Moderate assistance )(min A w/equal heights; mod A with incline)  Car Transfers: Moderate assistance(with lateral transfer board; seat equal height to w/c)  Car Type: car simulator in rehab    During bsc transfer, pt required mod A to go to bsc, but then min A to return to w/c using a lateral transfer board. Due to limited space because of car door, pt was assisted posteriorly by PT, with gait belt during car transfer training and then pt required max A to lift LE's into car. Pt continues to have issues with her feet sliding out from under her at times when attempting to push through her feet to scoot across board and leans posteriorly at times due to ongoing fears of falling.         BALANCE Daily Assessment    Sitting - Static: Fair (occasional)(+)  Sitting - Dynamic: Fair (occasional)  Standing - Static: Poor  Standing - Dynamic : Impaired       WHEELCHAIR MOBILITY Daily Assessment    Able to Propel (ft): 200 feet  Functional Level: 5(using B UE's)  Wheelchair Setup Assist Required : 4 (Minimal assistance)  Wheelchair Management: Manages left brake;Manages right brake;Manages left armrest;Manages right armrest(needs assist to manage footrests)    Pt education on management of w/c footrests, so pt can tell caregivers how to work footrests at home to assist during set-up for transfers. THERAPEUTIC EXERCISES Daily Assessment    Extremity: Both  Exercise Type #1: Seated lower extremity strengthening(AAROM/PROM, B LE's)  Sets Performed: 1  Reps Performed: 10  Level of Assist: Maximum assistance    Pt continues to require mod to max A for exercises. Pt noted to be demonstrating slight increase in contraction at hip, knee, and ankle (L getting more return than R)       ASSESSMENT:  Pt initiated car transfer training today with lateral transfer board and car simulator in rehab dept. Pt continues to be limited in her progress with PT due to issues with being easily distracted, talkative, and ongoing issues with need to toilet frequently. Spoke with pt regarding this and encouraged her to take more initiative to toilet with nursing prior to scheduled PT times, as well as to increase focus on therapy session and not be so easily distracted. Pt indicates she will call family member to get height of bed at home measured to practice bed <> w/c slide board transfers at proper height. Progression toward goals:  []      Improving appropriately and progressing toward goals  [x]      Improving slowly and progressing toward goals  []      Not making progress toward goals and plan of care will be adjusted     PLAN:  Patient continues to benefit from skilled intervention to address the above impairments. Continue treatment per established plan of care.   Discharge Recommendations:  Home Health with 24 hr assistance vs SNF  Further Equipment Recommendations for Discharge:  lateral transfer board (32 inch), gait belt, 18 inch w/c with removable arm rests, elevating/removable leg rests, and gel cushion, w/c ramp     Estimated LOS: 01/14/2020    Activity Tolerance:   Fair +, pt without c/o nausea, dizziness, or SOB; pt continues with ongoing c/o pain in back/incisional area. Please refer to the flowsheet for vital signs taken during this treatment.     After treatment:   Patient left sitting up in w/c with call light, bedside table, and phone in reach; O2 donned via nasal cannula      Ethan Ann, PT  1/7/2020

## 2020-01-07 NOTE — PROGRESS NOTES
conducted a Follow up consultation and Spiritual Assessment for Alicia Laughlin, who is a 62 y.o.,female. The  provided the following Interventions:  Continued the relationship of care and support. Listened empathically. Offered prayer and assurance of continued prayer on patients behalf. Chart reviewed. The following outcomes were achieved:  Patient expressed gratitude for 's visit. Assessment:  There are no further spiritual or Orthodox issues which require Spiritual Care Services interventions at this time. Plan:  Chaplains will continue to follow and will provide pastoral care on an as needed/requested basis.  recommends bedside caregivers page  on duty if patient shows signs of acute spiritual or emotional distress. Chaplain Thomas RICHARDS  [address] [facility]   [phone]

## 2020-01-08 NOTE — PROGRESS NOTES
conducted a Follow up consultation and Spiritual Assessment for Margret Frazier, who is a 62 y.o.,female. The  provided the following Interventions:  Continued the relationship of care and support. Listened empathically. Offered assurance of continued prayer on patients behalf. Chart reviewed. The following outcomes were achieved:  Patient expressed gratitude for 's visit. Assessment:  There are no further spiritual or Anabaptism issues which require Spiritual Care Services interventions at this time. Plan:  Chaplains will continue to follow and will provide pastoral care on an as needed/requested basis.  recommends bedside caregivers page  on duty if patient shows signs of acute spiritual or emotional distress. Chaplain Thomas RICHARDS  [address] [facility]   [phone]

## 2020-01-08 NOTE — PROGRESS NOTES
[address]  [address] [place] [address], [address]     INPATIENT REHABILITATION  DAILY PROGRESS NOTE     Date: 1/8/2020    Name: Sandi Amos Age / Sex: 62 y.o. / female   CSN: [id number] MRN: [id number]   [facility] Date: 12/16/2019 Length of Stay: 23 days     Primary Rehab Diagnosis: Impaired Mobility and ADLs secondary to:  1. S/P T10, T9, T8 laminectomy; T7, T8, T9, T10, T11, T12 posterior thoracic fusion; segmental spinal instrumentation; K2M Davis type, T7-T8, T11-T12 (12/11/2019 - Dr. Sultana Mendez)  2. History of acute compression fractures of body of thoracic vertebrae (T9 and T10) due to fall      Subjective:     Patient seen and examined. Blood pressure controlled. Blood glucose controlled. Patient's Complaint:   No significant medical complaints    Pain Control: stable, mild-to-moderate joint symptoms intermittently, reasonably well controlled by current meds      Objective:     Vital Signs:  Patient Vitals for the past 24 hrs:   BP Temp Pulse Resp SpO2   01/08/20 0725 120/70 98 °F (36.7 °C) 68 18 96 %   01/07/20 2122 117/69 98 °F (36.7 °C) 73 18 93 %   01/07/20 1552 105/65 97.7 °F (36.5 °C) 80 18 96 %        Physical Examination:  GENERAL SURVEY: Patient is awake, alert, oriented x 3, sitting comfortably on the chair, not in acute respiratory distress. HEENT: pale palpebral conjunctivae, anicteric sclerae, no nasoaural discharge, moist oral mucosa  NECK: supple, no jugular venous distention, no palpable lymph nodes  CHEST/LUNGS: symmetrical chest expansion, good air entry, clear breath sounds  HEART: adynamic precordium, good S1 S2, no S3, regular rhythm, no murmurs  ABDOMEN: obese, bowel sounds appreciated, soft, non-tender  EXTREMITIES: pale nailbeds, no edema, full and equal pulses, no calf tenderness   NEUROLOGICAL EXAM: The patient is awake, alert and oriented x3, able to answer questions fairly appropriately, able to follow 1 and 2 step commands.   Able to tell time from the wall clock. Cranial nerves II-XII are grossly intact. No gross sensory deficit. Motor strength is 4-/5 on BUE, 2+/5 on the RLE (except for 1/5 on the right ankle), 2-/5 on the LLE (except for 2+/5 on the left knee).      Incision(s): healing well, clean, dry, and intact and serosanguineous       Current Medications:  Current Facility-Administered Medications   Medication Dose Route Frequency    cholestyramine-aspartame (QUESTRAN LIGHT) packet 4 g  4 g Oral TID WITH MEALS    busPIRone (BUSPAR) tablet 5 mg  5 mg Oral TID    pantoprazole (PROTONIX) tablet 40 mg  40 mg Oral ACB    calcium carbonate (TUMS) chewable tablet 200 mg [elemental]  200 mg Oral TID PRN    insulin glargine (LANTUS) injection 25 Units  25 Units SubCUTAneous ACB    insulin lispro (HUMALOG) injection 4 Units  4 Units SubCUTAneous TIDAC    lubiPROStone (AMITIZA) capsule 24 mcg  24 mcg Oral DAILY WITH BREAKFAST    guaiFENesin ER (MUCINEX) tablet 600 mg  600 mg Oral Q12H    acetaminophen (TYLENOL) tablet 650 mg  650 mg Oral Q4H PRN    bisacodyl (DULCOLAX) tablet 10 mg  10 mg Oral Q48H PRN    ferrous sulfate tablet 325 mg  1 Tab Oral DAILY WITH BREAKFAST    ascorbic acid (vitamin C) (VITAMIN C) tablet 250 mg  250 mg Oral DAILY WITH BREAKFAST    insulin lispro (HUMALOG) injection   SubCUTAneous TIDAC    senna-docusate (PERICOLACE) 8.6-50 mg per tablet 2 Tab  2 Tab Oral PCD    methadone (DOLOPHINE) tablet 20 mg  20 mg Oral DAILY    oxyCODONE-acetaminophen (PERCOCET 10)  mg per tablet 1 Tab  1 Tab Oral Q4H PRN    predniSONE (DELTASONE) tablet 30 mg  30 mg Oral DAILY WITH BREAKFAST    rosuvastatin (CRESTOR) tablet 5 mg  5 mg Oral QHS    albuterol (PROVENTIL VENTOLIN) nebulizer solution 2.5 mg  2.5 mg Nebulization Q4H PRN    levothyroxine (SYNTHROID) tablet 75 mcg  75 mcg Oral 6am    cholecalciferol (VITAMIN D3) (400 Units /10 mcg) tablet 5 Tab  2,000 Units Oral DAILY    calcium citrate tablet 200 mg [elemental]  200 mg Oral BID    divalproex DR (DEPAKOTE) tablet 500 mg  500 mg Oral QHS    fluticasone-vilanterol (BREO ELLIPTA) 100mcg-25mcg/puff  1 Puff Inhalation DAILY    oxybutynin (DITROPAN) tablet 5 mg  5 mg Oral BID    allopurinol (ZYLOPRIM) tablet 100 mg  100 mg Oral DAILY    sertraline (ZOLOFT) tablet 50 mg  50 mg Oral DAILY    methocarbamol (ROBAXIN) tablet 500 mg  500 mg Oral Q8H    aspirin chewable tablet 81 mg  81 mg Oral DAILY WITH BREAKFAST    docusate sodium (COLACE) capsule 100 mg  100 mg Oral DAILY AFTER BREAKFAST       Allergies:   Allergies   Allergen Reactions    Moxifloxacin Rash    Pcn [Penicillins] Swelling    Sulfa (Sulfonamide Antibiotics) Swelling       Functional Progress:    PHYSICAL THERAPY    ON ADMISSION MOST RECENT   Wheelchair Mobility/Management  Able to Propel (ft): 140 feet(max A for steering)  Functional Level: 2  Curbs/Ramps Assist Required (FIM Score): 0 (Not tested)  Wheelchair Setup Assist Required : 2 (Maximal assistance)  Wheelchair Management: Manages left brake(needing assist with brakes) Wheelchair Mobility/Management  Able to Propel (ft): 150 feet  Functional Level: 5  Curbs/Ramps Assist Required (FIM Score): 3 (Moderate assistance)  Wheelchair Setup Assist Required : 3 (Moderate assistance)  Wheelchair Management: Manages left brake, Manages right brake     Gait  Amount of Assistance: 0 (Not tested)  Distance (ft): 0 Feet (ft)  Assistive Device: (N/A) Gait  Amount of Assistance: 0 (Not tested)  Distance (ft): 0 Feet (ft)  Assistive Device: (N/A)     Balance-Sitting/Standing  Sitting - Static: Fair (occasional)  Sitting - Dynamic: Fair (occasional), Occassional, Poor (constant support)  Standing - Static: Poor, Constant support  Standing - Dynamic : (not tested) Balance-Sitting/Standing  Sitting - Static: Fair (occasional)  Sitting - Dynamic: Fair (occasional)  Standing - Static: Poor  Standing - Dynamic : Impaired     Bed/Mat Mobility  Rolling Right : 2 (Maximal assistance)  Rolling Left : 2 (Maximal assistance)  Supine to Sit : 2 (Maximal assistance)  Sit to Supine : 2 (Maximal assistance) Bed/Mat Mobility  Rolling Right : 3 (Moderate assistance )  Rolling Left : 4 (Minimal assistance)  Supine to Sit : 3 (Moderate assistance)  Sit to Supine : 2 (Maximal assistance)     Transfers  Transfer Type: Lateral with transfer board  Other: also lateral w/transfer board to/from drop arm bsc, w/ min A  Transfer Assistance : 1 (Total assistance)(Max A x 2 for safety, sequencing, appropriate ant trunk )  Sit to Stand Assistance: Total assistance  Car Transfers: Not tested  Car Type: N/A Transfers  Transfer Type: Lateral with transfer board  Other: w/c <> bsc w/lateral transfer board  Transfer Assistance : 3 (Moderate assistance )  Sit to Stand Assistance: Maximum assistance(to parallel bar, with B feet blocked, from 24\" height mat )  Car Transfers:  Moderate assistance(with lateral transfer board; seat equal height to w/c)  Car Type: car simulator in rehab     Steps or Stairs  Steps/Stairs Ambulated (#): 0  Level of Assist : 0 (Not tested)  Rail Use: (N/A) Steps or Stairs  Steps/Stairs Ambulated (#): (0)  Level of Assist : 0 (Not tested)  Rail Use: (N/A)         Lab/Data Review:  Recent Results (from the past 24 hour(s))   GLUCOSE, POC    Collection Time: 01/07/20  4:30 PM   Result Value Ref Range    Glucose (POC) 282 (H) 70 - 110 mg/dL   GLUCOSE, POC    Collection Time: 01/07/20  9:26 PM   Result Value Ref Range    Glucose (POC) 151 (H) 70 - 110 mg/dL   GLUCOSE, POC    Collection Time: 01/08/20  8:03 AM   Result Value Ref Range    Glucose (POC) 85 70 - 110 mg/dL   GLUCOSE, POC    Collection Time: 01/08/20 12:10 PM   Result Value Ref Range    Glucose (POC) 157 (H) 70 - 110 mg/dL       Estimated Glomerular Filtration Rate:  On admission, estimated GFR based on a Creatinine of 0.68 mg/dl:              Using CKD-EPI = 107.9 mL/min/1.73m2              Using MDRD = 112.4 mL/min/1.73m2  Most recent estimated GFR, based on a Creatinine of 0.84 mg/dl on 1/6/2020:   Using CKD-EPI = 85.7 mL/min/1.73m2   Using MDRD = 88.8 mL/min/1.73m2       Assessment:     Primary Rehabilitation Diagnosis  1. Impaired Mobility and ADLs  2. S/P T10, T9, T8 laminectomy; T7, T8, T9, T10, T11, T12 posterior thoracic fusion; segmental spinal instrumentation; K2M [place] type, T7-T8, T11-T12 (12/11/2019 - Dr. Amparo Dumont)  3. History of acute compression fractures of body of thoracic vertebrae (T9 and T10) due to fall     Comorbidities   Diabetic neuropathy associated with type 2 diabetes mellitus (HCC) E11.40    Venous insufficiency I87.2    Obesity, Class I, BMI 30-34.9 E66.9    Chronic back pain M54.9, G89.29    Generalized osteoarthritis of multiple sites M15.9    Bipolar affective disorder  F31.9    Abnormally low high density lipoprotein (HDL) cholesterol with hypertriglyceridemia E78.6, Y24.8    Diastolic dysfunction without heart failure I51.89    Chronic obstructive pulmonary disease (COPD)  J44.9    Hypertensive heart disease without heart failure I11.9    Depression F32.9    History of back injury Z87.828    Type 2 diabetes mellitus with diabetic neuropathy, with long-term current use of insulin  E11.40, Z79.4    Anxiety F41.9    Wears glasses Z97.3    History of hepatitis C Z86.19    Sickle cell trait D57.3    History of acute renal failure Z87.448    Hypothyroidism E03.9    Recurrent genital herpes A60.00    Sarcoidosis D86.9    Abscess of right arm L02.413    Hypoxemia requiring supplemental oxygen R09.02, Z99.81    Abnormal nuclear stress test R94.39    Cellulitis and abscess of hand L03.119, L02.519    Cellulitis and abscess of foot L03.119, L02.619    Cellulitis of arm, right L03. 113    Olecranon bursitis of right elbow M70.21    Contusion of left elbow S50. 02XA    Intravenous drug user F19.90    Right Achilles tendinitis M76.61    History of penicillin allergy Z88.0    Falls frequently R29.6    Gastroesophageal reflux disease with hiatal hernia K21.9, K44.9    Memory difficulty R41.3    Mixed connective tissue disease  M35.1    Hyperuricemia E79.0    History of vitamin D deficiency Z86.39    Urge urinary incontinence N39.41    Acute blood loss as cause of postoperative anemia D62    History of fracture due to fall Z87.81    Chronic respiratory failure with hypoxia  J96.11    Cellulitis of right forearm L03. 113    Gout M10.9    Menopause Z78.0    Long term current use of aspirin Z79.82    Opioid dependence  F11.20    Tobacco use disorder F17.200        Plan:     1. Justification for continued stay: Good progression towards established rehabilitation goals. 2. Medical Issues being followed closely:    [x]  Fall and safety precautions     [x]  Wound Care     [x]  Bowel and Bladder Function     [x]  Fluid Electrolyte and Nutrition Balance     [x]  Pain Control      3. Issues that 24 hour rehabilitation nursing is following:    [x]  Fall and safety precautions     [x]  Wound Care     [x]  Bowel and Bladder Function     [x]  Fluid Electrolyte and Nutrition Balance     [x]  Pain Control      [x]  Assistance with and education on in-room safety with transfers to and from the bed, wheelchair, toilet and shower. 4. Acute rehabilitation plan of care:    [x]  Continue current care and rehab. [x]  Physical Therapy           [x]  Occupational Therapy           []  Speech Therapy     []  Hold Rehab until further notice     5. Medications:    [x]  MAR Reviewed     [x]  Continue Present Medications     6. DVT Prophylaxis:      []  Lovenox     []  Unfractionated Heparin     []  Coumadin     []  NOAC     [x]  ROBERT Stockings     []  Sequential Compression Device     []  None     7.  Orders:   > S/P T10, T9, T8 laminectomy; T7, T8, T9, T10, T11, T12 posterior thoracic fusion; segmental spinal instrumentation; K2M [place] type, T7-T8, T11-T12 (12/11/2019 -  Amparo Dumont);  History of acute compression fractures of body of thoracic vertebrae (T9 and T10) due to fall   > Thoracic spinal precautions   > Continue:    > Calcium citrate 200 mg PO BID    > Cholecalciferol 2,000 units PO once daily    > Acute Postoperative Blood Loss Anemia   > Anemia work-up (12/13/2019) showed serum iron 11, TIBC 215, iron % saturation 5, ferritin 146   > Hgb/Hct (12/17/2019, on admission) = 7.4/23.1   > Reticulocyte count (12/17/2019) = 2.5   > Hgb/Hct (12/23/2019) = 8.5/26.7    > Continue:    > FeSO4 325 mg PO once daily with breakfast     > Ascorbic Acid 250 mg PO once daily with breakfast (to enhance the absorption of the FeSO4)     > Benign hypertensive heart and kidney disease with stage 3 chronic kidney disease without congestive heart failure   > Prior to admission, the patient stated she was on Metolazone 2.5 mg PO q Mon-Wed-Fri   > Metolazone was not given during the patient's hospital stay at [address] [place] [address]    > Upon admission to the ARU, held Metolazone     > Chronic obstructive pulmonary disease; Chronic respiratory failure with hypoxemia requiring supplemental oxygen (3 LPM)   > Continue:    > Fluticasone-Vilanterol 100-25 1 puff inhaled once daily    > O2 inhalation at 3 LPM via nasal cannula continuous    > Opioid dependence   > Continue Methadone 20 mg PO once daily    > Type 2 diabetes mellitus with stage 3 chronic kidney disease   > HbA1c (12/11/2019) = 7.1   > On 12/22/2019:    > Increased Insulin glargine from 20 units to 22 units PO once daily before breakfast    > Started Insulin lispro 3 units SC TID AC    > On 12/24/2019, increased Insulin lispro from 3 units to 4 units SC TID AC    > On 12/25/2019, increased Insulin glargine from 22 units to 25 units PO once daily before breakfast   > Continue:    > Insulin glargine 25 units PO once daily before breakfast    > Insulin lispro 4 units SC TID AC     > Insulin lispro sliding scale SC TID AC only    > Cough   > On 12/18/2019, started Guaifenesin  mg PO q 12 hr   > Continue Guaifenesin  mg PO q 12 hr    > Constipation   > On 12/17/2019, started:    > Docusate sodium 100 mg PO once daily after breakfast    > PeriColace 2 tabs PO once daily after dinner   > On 12/22/2019, started Lubiprostone 24 mcg PO once daily with breakfast   > Continue:    > Docusate sodium 100 mg PO once daily after breakfast    > PeriColace 2 tabs PO once daily after dinner    > Lubiprostone 24 mcg PO once daily with breakfast    > Gastroesophageal reflux disease   > On 1/2/2019, Famotidine 20 mg PO BID was changed to Pantoprazole 40 mg PO once daily before breakfast   > Continue Pantoprazole 40 mg PO once daily before breakfast    > Anxiety   > On 1/1/2020, started Buspirone 5 mg PO TID   > Continue Buspirone 5 mg PO TID    > Analgesia   > Continue:    > Acetaminophen 650 mg PO q 4 hr PRN for pain level less than 5/10    > Methocarbamol 500 mg PO q 8 hr    > Percocet 10/325 1 tab PO q 4 hr PRN for pain level greater than 4/10     > Diet:   > Specifications: Cardiac, diabetic consistent carb 1800 kcal, no concentrated sweets, low purine   > Solids (consistency): Regular    > Liquids (consistency): Thin       8. Patient's progress in rehabilitation and medical issues discussed with the patient. All questions answered to the best of my ability. Care plan discussed with patient and nurse.       Signed:    Sean Figueredo MD    January 8, 2020

## 2020-01-08 NOTE — PROGRESS NOTES
Problem: Self Care Deficits Care Plan (Adult)  Goal: *Therapy Goal (Edit Goal, Insert Text)  Description  Occupational Therapy Goals   Long Term Goals  Initiated 19 and to be accomplished within 4 week(s)  to facilitate increased self care independence and strength for return to PLOF and decreased care giver burden:   2. Pt will perform grooming with Mod I.   3. Pt will perform UB bathing with SBA. 4. Pt will perform LB bathing with Tyree. 5. Pt will perform tub/shower transfer <> TTB with Min A.   6. Pt will perform UB dressing with Set-up. 7. Pt will perform LB dressing with Min A.  8. Pt will perform toileting task with Min A.  9. Pt will perform toilet transfer with Min A/CGA. Short Term Goals   Initiated 19 and to be accomplished within 7 day(s) (2020) to facilitate increased self care independence and strength for return to PLOF and decreased care giver burden:   1. Pt will perform self-feeding with Mod I.   2. Pt will perform grooming with set-up. ()  3. Pt will perform UB bathing with Mod A. ()  4. Pt will perform LB bathing with Mod A in LRE. ()  5. Pt will perform tub/shower transfer <> TTB with MaxA x 1.-   6. Pt will perform UB dressing with SBA. -   7. Pt will perform LB dressing with Mod A using AE as needed. ()  8. Pt will perform toileting task with Max A x 1. ()  9. Pt will perform toilet transfer with MaxA x 1.- 20 UG Mod assist         Outcome Measures:  (19) Dynamometer  strength: Right= 27.33# (norm=55. 1#) Left= 34.67# (norm=45.7#)  (19) 9-hole peg test: Right=45.46 seconds ; Left=41.13 seconds         Outcome: Not Progressing Towards Goal  Goal: *Therapy Goal (Edit Goal, Insert Text)  Outcome: Not Progressing Towards Goal  Goal: Interventions  Outcome: Not Progressing Towards Goal  OCCUPATIONAL THERAPY TREATMENT    Patient: Chayito Arteaga Roots   62 y.o.     Patient identified with name and : YES    Date: 2020    First Tx Session  Time In: 903  Time Out[de-identified] 468    Second Tx Session  Time In: 1000  Time Out[de-identified] 1100    Diagnosis: Status post laminectomy with spinal fusion [Z98.1]   Precautions: Fall, Spinal(thoracic)  Chart, occupational therapy assessment, plan of care, and goals were reviewed. Pain:  Pt reports 10/10 pain or discomfort prior to treatment. Pt reports 10/10 pain or discomfort post treatment. Intervention Provided: Pt received pain med's      SUBJECTIVE:   Patient stated My bottom won't move, what else I am supposed to do'    OBJECTIVE DATA SUMMARY:   Pt seen in dinning room finishing up breakfast. Pt 's Aid ~ 7-10  minutes late (arriving ~ 9:08) for family education with MsJose Armando Sinha than leaves ~ 30 minutes later (9:45) states she need to take her daughter to work because her car is in the shop and they area sharing one car. Aid states she is available on Friday morning at the same time to con't education OT suggesting coming in earlier if possible. Pt's Aid will most likely getting Ms. Sinha up in the AM unless her daughter does it before work therefore, educate Aid on using sliding board with a drop arm commode. Started with educational transfer to EOB, Aid states she is  knowledgeable on removing wheel chair leg rest but unable to do so. Educate Aid on removing wheel chair leg rest than proceed with transfer to EOB with Aid inserting board 2/2 Aid states she has experience with using sliding board. Pt inability to performed lateral transfer on board 2/2 pt pushing back having board under thigh vs buttocks. Treatment session ended 2/2 it was PT time for education with Aid. Second session: pt seen seated on mat following PT session. Pt simulated donning pants with T-band weight shifting  to carryover to toileting tasks with weight shifting .  Pt donned T-band from ankle to knee with reacher than upon thigh to toward waist. Pt needed increase time and re-directing to tasks with mod cuing 2/2 distraction form environment and constant talking. Pt states I need  to used the commode than changed her mind upon arrival in room. Pt than performs IADL's tasks with supervision. IADL Daily Assessment    Pt used reacher retrieving laundry off bed for folding. MOBILITY/TRANSFERS Daily Assessment     Pt needed mod cuing for hand placement and  maintaining buttocks on board. ASSESSMENT:Pt inability to attend to and stay on task, poor attention span and poor recalling. Pt and Aid would benefits from additional education for safety in home upon d/c. Progression toward goals:  []          Improving appropriately and progressing toward goals  [x]          Improving slowly and progressing toward goals  []          Not making progress toward goals and plan of care will be adjusted     PLAN:  Patient continues to benefit from skilled intervention to address the above impairments. Continue treatment per established plan of care. Discharge Recommendations:  Home Health   Further Equipment Recommendations for Discharge: Drop arm commode     Activity Tolerance:  Fair      Estimated LOS:1/14    Please refer to the flow sheet for vital signs taken during this treatment. After treatment:   [x]  Patient left in no apparent distress sitting up in chair   []  Patient left in no apparent distress in bed  [x]  Call bell left within reach  []  Nursing notified  []  Caregiver present  []  Bed alarm activated    COMMUNICATION/EDUCATION:   [] Home safety education was provided and the patient/caregiver indicated understanding. [] Patient/family have participated as able in goal setting and plan of care. [x] Patient/family agree to work toward stated goals and plan of care. [] Patient understands intent and goals of therapy, but is neutral about his/her participation. [] Patient is unable to participate in goal setting and plan of care.       Noah FORREST  1/8/2020

## 2020-01-08 NOTE — INTERDISCIPLINARY ROUNDS
[facility] PHYSICAL REHABILITATION  [address] [place] [address], [address]    INPATIENT REHABILITATION  PRE-TEAM CONFERENCE SUMMARY     Date of Conference: 1/9/2020    Patient Information:        Name: Lina Sanchez Age / Sex: 62 y.o. / female   CSN: [id number] MRN: [id number]   [facility] [place] [facility] Date: 12/16/2019 Length of Stay: 23 days     Primary Rehabilitation Diagnosis  1. Impaired Mobility and ADLs  2. S/P T10, T9, T8 laminectomy; T7, T8, T9, T10, T11, T12 posterior thoracic fusion; segmental spinal instrumentation; K2M Davis type, T7-T8, T11-T12 (12/11/2019 - Dr. Nuris Romero)  3. History of acute compression fractures of body of thoracic vertebrae (T9 and T10) due to fall     Comorbidities   Diabetic neuropathy associated with type 2 diabetes mellitus (HCC) E11.40    Venous insufficiency I87.2    Obesity, Class I, BMI 30-34.9 E66.9    Chronic back pain M54.9, G89.29    Generalized osteoarthritis of multiple sites M15.9    Bipolar affective disorder  F31.9    Abnormally low high density lipoprotein (HDL) cholesterol with hypertriglyceridemia E78.6, E17.2    Diastolic dysfunction without heart failure I51.89    Chronic obstructive pulmonary disease (COPD)  J44.9    Hypertensive heart disease without heart failure I11.9    Depression F32.9    History of back injury Z87.828    Type 2 diabetes mellitus with diabetic neuropathy, with long-term current use of insulin  E11.40, Z79.4    Anxiety F41.9    Wears glasses Z97.3    History of hepatitis C Z86.19    Sickle cell trait D57.3    History of acute renal failure Z87.448    Hypothyroidism E03.9    Recurrent genital herpes A60.00    Sarcoidosis D86.9    Abscess of right arm L02.413    Hypoxemia requiring supplemental oxygen R09.02, Z99.81    Abnormal nuclear stress test R94.39    Cellulitis and abscess of hand L03.119, L02.519    Cellulitis and abscess of foot L03.119, L02.619    Cellulitis of arm, right L03. 113    Olecranon bursitis of right elbow M70.21    Contusion of left elbow S50. 02XA    Intravenous drug user F19.90    Right Achilles tendinitis M76.61    History of penicillin allergy Z88.0    Falls frequently R29.6    Gastroesophageal reflux disease with hiatal hernia K21.9, K44.9    Memory difficulty R41.3    Mixed connective tissue disease  M35.1    Hyperuricemia E79.0    History of vitamin D deficiency Z86.39    Urge urinary incontinence N39.41    Acute blood loss as cause of postoperative anemia D62    History of fracture due to fall Z87.81    Chronic respiratory failure with hypoxia  J96.11    Cellulitis of right forearm L03. 113    Gout M10.9    Menopause Z78.0    Long term current use of aspirin Z79.82    Opioid dependence  F11.20    Tobacco use disorder F17.200          Therapy:     FIM SCORES Initial Assessment Weekly Progress Assessment 1/8/2020   Eating Functional Level: 5  Comments: Pt requiring encouragement to demo container mgmt I'ly, requiring set-up over all. Pt able to demo utensil use I'ly  5-supervision   Swallowing     Grooming 4  6-mod independent   Bathing 2        Upper Body Dressing Functional Level: 3  Items Applied/Steps Completed: Pullover (4 steps)  Comments: Pt requiring modA to [facility] gown and modA to shante t-shirt. Pt unable to pull down shirt anterior/posterior. 5- set up   Lower Body Dressing Functional Level: 1  Items Applied/Steps Completed: Sock, left (1 step), Sock, right (1 step), Elastic waist pants (3 steps), Underpants (3 steps)  Comments: Pt requiring total asst for compression stockings/sock mgmt with pt seated EOB. Pt rolling side:side with maxA with rehab tech present to asst with brief mgmt and donning pants. Pt attempting to bridge hips to shante pants over hips with pt unable to fully clear sacrum. 3-mod assist   Toileting Functional Level: 1  Comments: Pt incontinent of bladder/bowel, requiring total asst for hygiene and brief mgmt from bed level.    2 max assist Bladder  level of assist 1     Bladder  accident frequency score 1     Bowel  level of assist 1     Bowel  accident frequency score 1     Toilet Transfer [place] Toilet Transfer Score: 0  Comments: NT at this time    3 (Mod A)   Tub/Shower Transfer [place] Tub or Shower Type: Tub/Shower combination  Tub/Shower Transfer Score: 0  Comments: NT at this time due to safety and medical status      0   Comprehension Primary Mode of Comprehension: Auditory  Score: 6  Comments: occasional repetition needed        Expression Primary Mode of Expression: Verbal  Score: 6        Social Interaction Score: 5  Comments: re-direction to task at times     Problem Solving Score: 4  Comments: min cues for following spinal precautions     Memory Score: 5       FIM SCORES Initial Assessment Weekly Progress Assessment 1/8/2020   Bed/Chair/Wheelchair Transfers Transfer Type: Lateral with transfer board  Other: also lateral w/transfer board to/from drop arm bsc, w/ min A  Transfer Assistance : 1 (Total assistance)(Max A x 2 for safety, sequencing, appropriate ant trunk )  Sit to Stand Assistance:  Total assistance  Car Transfers: Not tested  Car Type: N/A Transfer Type: Lateral with transfer board  Transfer Assistance : 3 (Moderate assistance )   Bed Mobility Rolling Right 2 (Maximal assistance)   Rolling Left 2 (Maximal assistance)   Supine to Sit 2 (Maximal assistance)   Sit to Stand Total assistance   Sit to Supine 2 (Maximal assistance)    Rolling Right   3 (Moderate assistance )   Rolling Left       Supine to Sit   3 (Moderate assistance)   Sit to Stand       Sit to Supine   2 (Maximal assistance)      Locomotion (W/C) Able to Propel (ft): 140 feet(max A for steering)  Functional Level: 2  Curbs/Ramps Assist Required (FIM Score): 0 (Not tested)  Wheelchair Setup Assist Required : 2 (Maximal assistance)  Wheelchair Management: Manages left brake(needing assist with brakes) Function 5  Setup Assistance  3 (Moderate assistance)      Locomotion (W/C distance) 140 Feet(max A for steering appropriately) 150 feet   Locomotion (Walk) 0 (Not tested)  NA      Locomotion (Walk dist.) 0 Feet (ft)     Steps/Stairs Steps/Stairs Ambulated (#): 0  Level of Assist : 0 (Not tested)  Rail Use: (N/A)  NA         Nursing:     Neuro:   AAA&O x 4           Respiratory:   [] WNL   [x] O2 LPM:   Other:  Peripheral Vascular:   [x] TEDS present   [x] Edema present _2___ Grade   Cardiac:   [x] WNL   [] Other  Genitourinary:   [] continent   [x] incontinent   [] rousseau  Abdominal _1/08______ LBM  GI: _Cardiac soft solids______ Diet _Thin_____ Liquids _____ tube feeds  Musculoskeletal: ____ ROM Transfers __slide board and wheelchair___ Assistive Device Used  __max__ Level of Assistance  Skin Integumentary:   [] Intact   [] Not Intact   __________Preventative Measures  Details___Upper mid back incision  healing ___________________________________________________________  Pain: [x] Controlled   [] Not Controlled   Pain Meds:   [] Scheduled   [x] PRN        Registered Dietitian / Nutrition:   No data found. Pt unavailable at time of visit. Had reported good appetite and meal intake during previous RD visit. Will continue to monitor     Supplements:          [] Yes   [x] No      Amount of supplement consumed: not applicable      No intake or output data in the 24 hours ending 01/08/20 1437                            Last bowel movement: 1/8      Interdisciplinary Team Goals:     1. Discipline  Physical Therapy    Goal  Pt will perform bed mobility with mod assist during family training to maintain spinal precautions. Barrier  BLE weakness, decreased core stability, pain, decreased compliance with spinal precautions. Intervention  slide board txfr training, bed mobility, balance activities, w/c mobility, therapeutic exercises    Goal written by:   PURNIMA Pearson, PT, DPT     2.  Discipline  Occupational Therapy    Goal  pt to re-call and carryover functional activities with toileting  tasks while following lumbar precaution     Barrier  Decrease independency with self care/ toileting tasks for example, hygiene and CM with weight shifting     Intervention  Strengthening and education    Goal written by:  SABINE Hernandez/JANNET     3. Discipline  Speech Therapy    Goal      Barrier      Intervention      Goal written by:       4. Discipline  Nursing    Goal  By discharge pt will sense the urge to void / bowel movement and notify nurse before becoming incontient    Barrier  Patient waits to call, weak bladder    Intervention  Toilet every 2 hours,      Goal written by:  Anna Marie Bello LPN     5. Discipline  Clinical Psychology    Goal  Maintain focus to task assignment with maximum effort toward independent function, as appropriate    Barrier  Off task and sometimes distracted/preoccupied    Intervention  Behavioral cues and prompts and redirection    Goal written by:  Emerita Hurt, PhD     6. Discipline  Nutrition / Dietetics    Goal  - PO nutrition intake will continue to meet >75% of patients estimated nutritional needs over the next 7 days. Outcome: Progressing towards goal      Barrier  none known     Intervention  continue po diet. Monitor meal intake    Goal written by:  Nolan Erazo RD       Disposition / Discharge Planning:      Follow-up services:  [x] Physical Therapy             [x] Occupational Therapy       [] Speech Therapy           [] Skilled Nursing      [] Medical Social Worker   [] Aide        [] Outpatient      [] vs   [x] Home Health  [] vs       [] to progress to outpatient       [] with 24-hour supervision       [x] with 24-hour assistance   [] Jonathan RAMSAY recommendations: Drop arm commode, 18\" w/c, slide board, w/c ramp   Estimated discharge date:  1/14/2020   Discharge Location:  [x] Home  [] versus    [] Jonathan Caldwell    [] [address]   [] Other: Electronic Signatures:      Signature Date Signed   Physical Therapist    PURNIMA Villalta, PT, DPT  1/8/2020   Occupational Therapist    Scarlet Echeverria, OTR/L  1/8/2020 1/9/20   Speech Therapist         Recreational Therapist   Lexx Fournier, CTRS 1/8/2020   Nursing    Kedar Styles LPFERNANDEZ/ Shakir Valdez MSN RN AGCNS  1/08/20   Dietitian    Myrl Lesches, [address]  1/8/2020   Clinical Psychologist    Nirmal Foss, PhD  1/8/2020    Physician    Sybil Stock MD   1/8/2020        Rosa Maria Bolaños, MSW  1/8/2020         The above information has been reviewed with the patient in a language that they can understand. Opportunity for comments and questions has been provided and a signed attestation has been scanned into the \"media tab\" of the EMR.       Patient Signature: ______________________________________________________    Date Signed: __________________________________________________________

## 2020-01-08 NOTE — PROGRESS NOTES
SHIFT CHANGE NOTE FOR [facility]    Bedside and Verbal shift change report given to Hossein Reynolds (oncoming nurse) by Isidro Badillo LPN (offgoing nurse). Report included the following information SBAR, Kardex, MAR and Recent Results.     Situation:   Code Status: Full Code   Reason for Admission: Spinal Thoracic laminectomy T 6-T [address] Day: 23   Problem List:   Hospital Problems  Date Reviewed: 1/7/2020          Codes Class Noted POA    Chronic respiratory failure with hypoxia (HCC) (Chronic) ICD-10-CM: J96.11  ICD-9-CM: 518.83, 799.02  Unknown Yes    Overview Signed 12/16/2019 10:11 PM by Colin Gaitan MD     On home oxygen inhalation at 3 LPM via NC             Opioid dependence ([facility] [address] (Chronic) ICD-10-CM: F11.20  ICD-9-CM: 304.00  Unknown Yes    Overview Signed 12/16/2019 10:54 PM by Colin Gaitan MD     On Methadone             Acute blood loss as cause of postoperative anemia ICD-10-CM: D62  ICD-9-CM: 285.1  12/12/2019 Yes        Impaired mobility and ADLs ICD-10-CM: Z74.09  ICD-9-CM: 799.89  12/11/2019 Yes        Spinal cord compression ([facility] [address] ICD-10-CM: G95.20  ICD-9-CM: 336.9  12/11/2019 Yes        Compression fracture of body of thoracic vertebra (HCC) ICD-10-CM: S22.000A  ICD-9-CM: 805.2  12/11/2019 Yes        * (Principal) Status post laminectomy with spinal fusion ICD-10-CM: Z98.1  ICD-9-CM: V45.4  12/11/2019 Yes    Overview Signed 12/16/2019 10:05 PM by Colin Gaitan MD     S/P T10, T9, T8 laminectomy; T7, T8, T9, T10, T11, T12 posterior thoracic fusion; segmental spinal instrumentation; K2M Davis type, T7-T8, T11-T12 (12/11/2019 - Dr. Ish Joshi)             History of fracture due to fall ICD-10-CM: Z87.81  ICD-9-CM: V15.51  12/11/2019 Yes        Hypoxemia requiring supplemental oxygen (Chronic) ICD-10-CM: R09.02, Z99.81  ICD-9-CM: 799.02  12/29/2014 Yes        Chronic obstructive pulmonary disease (COPD) (HCC) (Chronic) ICD-10-CM: J44.9  ICD-9-CM: 496  Unknown Yes    Overview Signed 4/23/2013 10:01 AM by Jax Florez     SOB, on paula O2  Recent admission with psychosis             Hypertensive heart disease without heart failure (Chronic) ICD-10-CM: I11.9  ICD-9-CM: 402.90  Unknown Yes    Overview Signed 4/23/2013 10:02 AM by Chika CALLES     Better controlled             Type 2 diabetes mellitus with diabetic neuropathy, with long-term current use of insulin ([facility] [address] (Chronic) ICD-10-CM: E11.40, Z79.4  ICD-9-CM: 250.60, 357.2, V58.67  Unknown Yes    Overview Signed 12/18/2019  2:13 PM by Bernard Sandoval MD     HbA1c (12/11/2019) = 7.1                   Background:   Past Medical History:   Past Medical History:   Diagnosis Date    Abnormally low high density lipoprotein (HDL) cholesterol with hypertriglyceridemia     Lipid profile (11/6/2016) showed , , HDL 38, LDL 37    Acute blood loss as cause of postoperative anemia 12/12/2019    Anxiety     Bipolar affective disorder ([facility] [address] 12/5/2012    Cellulitis of right forearm 05/04/2017    Chronic back pain     Chronic obstructive pulmonary disease (COPD) (HCC)     SOB, on paula O2 Recent admission with psychosis     Chronic respiratory failure with hypoxia (HCC)     On home oxygen inhalation at 3 LPM via NC    Contusion of left elbow 5/4/2017    Depression     Diabetic neuropathy associated with type 2 diabetes mellitus (HCC)     Diastolic dysfunction without heart failure     Stable on diuretics     Falls frequently     Gastroesophageal reflux disease with hiatal hernia     Generalized osteoarthritis of multiple sites     Gout     On Allopurinol    History of acute renal failure 5/31/2013    History of back injury     jumped out of second story window     History of fracture due to fall 12/11/2019    History of hepatitis C     treated    History of penicillin allergy     History of vitamin D deficiency 5/10/2017    Hypertensive heart disease without heart failure     Better controlled     Hyperuricemia 5/26/2017    Hypothyroidism     Hypoxemia requiring supplemental oxygen 12/29/2014    Intravenous drug user 5/2/2017    Long term current use of aspirin     Memory difficulty     Menopause     Mixed connective tissue disease ([facility] [address] 5/10/2017    Obesity, Class I, BMI 30-34.9     Olecranon bursitis of right elbow 5/4/2017    Opioid dependence ([facility]     On Methadone    Recurrent genital herpes 5/31/2013    Right Achilles tendinitis 5/2/2017    Sarcoidosis     Sickle cell trait ([facility]     Tobacco use disorder     Type 2 diabetes mellitus with diabetic neuropathy, with long-term current use of insulin ([facility])     HbA1c (12/11/2019) = 7.1    Urge urinary incontinence 5/10/2017    Venous insufficiency     Wears glasses       Patient taking anticoagulants no    Patient has a defibrillator: no     Assessment:   Changes in Assessment throughout shift: None     Patient has central line: no Reasons if yes: Removed 12/16/19  Insertion date:n/a Last dressing date:n/a   Patient has Renae Cath: no Reasons if yes: n/a   Insertion date:n/a     Last Vitals:     Vitals:    01/07/20 0803 01/07/20 1552 01/07/20 2122 01/08/20 0725   BP: 111/65 105/65 117/69 120/70   Pulse: 73 80 73 68   Resp: 18 18 18 18   Temp: 97.9 °F (36.6 °C) 97.7 °F (36.5 °C) 98 °F (36.7 °C) 98 °F (36.7 °C)   SpO2: 96% 96% 93% 96%   Weight:       LMP: 11/25/2012        PAIN    Pain Assessment    Pain Intensity 1: 7 (01/08/20 0600) Pain Intensity 1: 2 (12/29/14 1105)    Pain Location 1: Rectal Pain Location 1: Abdomen    Pain Intervention(s) 1: Medication (see MAR) Pain Intervention(s) 1: Medication (see MAR)  Patient Stated Pain Goal: 0 Patient Stated Pain Goal: 0  o Intervention effective: yes    o Other actions taken for pain: no c/o     Skin Assessment  Skin color Skin Color: Appropriate for ethnicity  Condition/Temperature Skin Condition/Temp: Dry, Warm  Integrity Skin Integrity: Incision (comment)  Turgor Turgor: Non-tenting  Weekly Pressure Ulcer Documentation  Pressure  Injury Documentation: No Pressure Injury Noted-Pressure Ulcer Prevention Initiated  Wound Prevention & Protection Methods  Orientation of wound Orientation of Wound Prevention: Posterior  Location of Prevention Location of Wound Prevention: Sacrum/Coccyx  Dressing Present Dressing Present : No  Dressing Status Dressing Status: Intact  Wound Offloading Wound Offloading (Prevention Methods): Bed, pressure redistribution/air, Repositioning, Specialty boot/shoe     INTAKE/OUPUT  Date 01/07/20 0700 - 01/08/20 0659 01/08/20 0700 - 01/09/20 0659   Shift 0700-1859 1900-0659 24 Hour Total 0700-1859 1900-0659 24 Hour Total   INTAKE   Shift Total(mL/kg)         OUTPUT   Urine(mL/kg/hr)           Urine Occurrence(s) 1 x 4 x 5 x      Stool           Stool Occurrence(s) 1 x 2 x 3 x      Shift Total(mL/kg)         NET         Weight (kg) 78.2 78.2 78.2 78.2 78.2 78.2       Recommendations:  1. Patient needs and requests: met    2. Diet: Cardiac DM Reg /thin liq    3. Pending tests/procedures: none     4. Functional Level/Equipment: wheelchair    5. Estimated Discharge Date: TBD Posted on Whiteboard in Patients Room: yes     HEALS Safety Check    A safety check occurred in the patient's room between off going nurse and oncoming nurse listed above.     The safety check included the below items  Area Items   H  High Alert Medications - Verify all high alert medication drips (heparin, PCA, etc.)   E  Equipment - Suction is set up for ALL patients (with yanker)  - Red plugs utilized for all equipment (IV pumps, etc.)  - WOWs wiped down at end of shift.  - Room stocked with oxygen, suction, and other unit-specific supplies   A  Alarms - Bed alarm is set for fall risk patients  - Ensure chair alarm is in place and activated if patient is up in a chair   L  Lines - Check IV for any infiltration  - Renae bag is empty if patient has a Renae   - Tubing and IV bags are labeled   S  Safety   - Room is clean, patient is clean, and equipment is clean. - Hallways are clear from equipment besides carts. - Fall bracelet on for fall risk patients  - Ensure room is clear and free of clutter  - Suction is set up for ALL patients (with lashay)  - Hallways are clear from equipment besides carts.    - Isolation precautions followed, supplies available outside room, sign posted

## 2020-01-08 NOTE — PROGRESS NOTES
SHIFT CHANGE NOTE FOR [facility]    Bedside and Verbal shift change report given to Sissy De La Cruz LPN (oncoming nurse) by Kevan Abreu RN (offgoing nurse). Report included the following information SBAR, Kardex, MAR and Recent Results.     Situation:   Code Status: Full Code   Reason for Admission: Spinal Thoracic laminectomy T 6-T [address] Day: 23   Problem List:   Hospital Problems  Date Reviewed: 1/7/2020          Codes Class Noted POA    Chronic respiratory failure with hypoxia (HCC) (Chronic) ICD-10-CM: J96.11  ICD-9-CM: 518.83, 799.02  Unknown Yes    Overview Signed 12/16/2019 10:11 PM by Fco Gómez MD     On home oxygen inhalation at 3 LPM via NC             Opioid dependence ([facility] (Chronic) ICD-10-CM: F11.20  ICD-9-CM: 304.00  Unknown Yes    Overview Signed 12/16/2019 10:54 PM by Fco Gómez MD     On Methadone             Acute blood loss as cause of postoperative anemia ICD-10-CM: D62  ICD-9-CM: 285.1  12/12/2019 Yes        Impaired mobility and ADLs ICD-10-CM: Z74.09  ICD-9-CM: 799.89  12/11/2019 Yes        Spinal cord compression ([facility] ICD-10-CM: G95.20  ICD-9-CM: 336.9  12/11/2019 Yes        Compression fracture of body of thoracic vertebra (HCC) ICD-10-CM: S22.000A  ICD-9-CM: 805.2  12/11/2019 Yes        * (Principal) Status post laminectomy with spinal fusion ICD-10-CM: Z98.1  ICD-9-CM: V45.4  12/11/2019 Yes    Overview Signed 12/16/2019 10:05 PM by Fco Gómez MD     S/P T10, T9, T8 laminectomy; T7, T8, T9, T10, T11, T12 posterior thoracic fusion; segmental spinal instrumentation; K2M Davis type, T7-T8, T11-T12 (12/11/2019 - Dr. Ros Pathak)             History of fracture due to fall ICD-10-CM: Z87.81  ICD-9-CM: V15.51  12/11/2019 Yes        Hypoxemia requiring supplemental oxygen (Chronic) ICD-10-CM: R09.02, Z99.81  ICD-9-CM: 799.02  12/29/2014 Yes        Chronic obstructive pulmonary disease (COPD) (HCC) (Chronic) ICD-10-CM: J44.9  ICD-9-CM: 496  Unknown Yes    Overview Signed 4/23/2013 10:01 AM by Tala Escobar     SOB, on paula O2  Recent admission with psychosis             Hypertensive heart disease without heart failure (Chronic) ICD-10-CM: I11.9  ICD-9-CM: 402.90  Unknown Yes    Overview Signed 4/23/2013 10:02 AM by Sindi CALLES     Better controlled             Type 2 diabetes mellitus with diabetic neuropathy, with long-term current use of insulin ([facility] [address] (Chronic) ICD-10-CM: E11.40, Z79.4  ICD-9-CM: 250.60, 357.2, V58.67  Unknown Yes    Overview Signed 12/18/2019  2:13 PM by Yuval Lawrence MD     HbA1c (12/11/2019) = 7.1                   Background:   Past Medical History:   Past Medical History:   Diagnosis Date    Abnormally low high density lipoprotein (HDL) cholesterol with hypertriglyceridemia     Lipid profile (11/6/2016) showed , , HDL 38, LDL 37    Acute blood loss as cause of postoperative anemia 12/12/2019    Anxiety     Bipolar affective disorder ([facility] [address] 12/5/2012    Cellulitis of right forearm 05/04/2017    Chronic back pain     Chronic obstructive pulmonary disease (COPD) (HCC)     SOB, on paula O2 Recent admission with psychosis     Chronic respiratory failure with hypoxia (HCC)     On home oxygen inhalation at 3 LPM via NC    Contusion of left elbow 5/4/2017    Depression     Diabetic neuropathy associated with type 2 diabetes mellitus (HCC)     Diastolic dysfunction without heart failure     Stable on diuretics     Falls frequently     Gastroesophageal reflux disease with hiatal hernia     Generalized osteoarthritis of multiple sites     Gout     On Allopurinol    History of acute renal failure 5/31/2013    History of back injury     jumped out of second story window     History of fracture due to fall 12/11/2019    History of hepatitis C     treated    History of penicillin allergy     History of vitamin D deficiency 5/10/2017    Hypertensive heart disease without heart failure     Better controlled     Hyperuricemia 5/26/2017    Hypothyroidism     Hypoxemia requiring supplemental oxygen 12/29/2014    Intravenous drug user 5/2/2017    Long term current use of aspirin     Memory difficulty     Menopause     Mixed connective tissue disease ([facility] 5/10/2017    Obesity, Class I, BMI 30-34.9     Olecranon bursitis of right elbow 5/4/2017    Opioid dependence ([facility]     On Methadone    Recurrent genital herpes 5/31/2013    Right Achilles tendinitis 5/2/2017    Sarcoidosis     Sickle cell trait ([facility]     Tobacco use disorder     Type 2 diabetes mellitus with diabetic neuropathy, with long-term current use of insulin ([facility])     HbA1c (12/11/2019) = 7.1    Urge urinary incontinence 5/10/2017    Venous insufficiency     Wears glasses       Patient taking anticoagulants no    Patient has a defibrillator: no     Assessment:   Changes in Assessment throughout shift: None     Patient has central line: no Reasons if yes: Removed 12/16/19  Insertion date:n/a Last dressing date:n/a   Patient has Renae Cath: no Reasons if yes: n/a   Insertion date:n/a     Last Vitals:     Vitals:    01/06/20 2100 01/07/20 0803 01/07/20 1552 01/07/20 2122   BP: 132/71 111/65 105/65 117/69   Pulse: 89 73 80 73   Resp: 20 18 18 18   Temp: 98.2 °F (36.8 °C) 97.9 °F (36.6 °C) 97.7 °F (36.5 °C) 98 °F (36.7 °C)   SpO2: 96% 96% 96% 93%   Weight:       LMP: 11/25/2012        PAIN    Pain Assessment    Pain Intensity 1: 7 (01/08/20 0600) Pain Intensity 1: 2 (12/29/14 1105)    Pain Location 1: Rectal Pain Location 1: Abdomen    Pain Intervention(s) 1: Medication (see MAR) Pain Intervention(s) 1: Medication (see MAR)  Patient Stated Pain Goal: 0 Patient Stated Pain Goal: 0  o Intervention effective: yes    o Other actions taken for pain: no c/o     Skin Assessment  Skin color Skin Color: Appropriate for ethnicity  Condition/Temperature Skin Condition/Temp: Dry, Warm  Integrity Skin Integrity: Incision (comment)  Turgor Turgor: Non-tenting  Weekly Pressure Ulcer Documentation  Pressure  Injury Documentation: No Pressure Injury Noted-Pressure Ulcer Prevention Initiated  Wound Prevention & Protection Methods  Orientation of wound Orientation of Wound Prevention: Posterior  Location of Prevention Location of Wound Prevention: Sacrum/Coccyx  Dressing Present Dressing Present : No  Dressing Status Dressing Status: Intact  Wound Offloading Wound Offloading (Prevention Methods): Bed, pressure redistribution/air, Repositioning, Specialty boot/shoe     INTAKE/OUPUT  Date 01/07/20 0700 - 01/08/20 0659 01/08/20 0700 - 01/09/20 0659   Shift 0700-1859 1900-0659 24 Hour Total 0700-1859 1900-0659 24 Hour Total   INTAKE   Shift Total(mL/kg)         OUTPUT   Urine(mL/kg/hr)           Urine Occurrence(s) 1 x 4 x 5 x      Stool           Stool Occurrence(s) 1 x 2 x 3 x      Shift Total(mL/kg)         NET         Weight (kg) 78.2 78.2 78.2 78.2 78.2 78.2       Recommendations:  1. Patient needs and requests: met    2. Diet: Cardiac DM Reg /thin liq    3. Pending tests/procedures: none     4. Functional Level/Equipment: wheelchair    5. Estimated Discharge Date: TBD Posted on Whiteboard in Patients Room: yes     HEALS Safety Check    A safety check occurred in the patient's room between off going nurse and oncoming nurse listed above.     The safety check included the below items  Area Items   H  High Alert Medications - Verify all high alert medication drips (heparin, PCA, etc.)   E  Equipment - Suction is set up for ALL patients (with yanker)  - Red plugs utilized for all equipment (IV pumps, etc.)  - WOWs wiped down at end of shift.  - Room stocked with oxygen, suction, and other unit-specific supplies   A  Alarms - Bed alarm is set for fall risk patients  - Ensure chair alarm is in place and activated if patient is up in a chair   L  Lines - Check IV for any infiltration  - Renae bag is empty if patient has a Renae   - Tubing and IV bags are labeled   S  Safety   - Room is clean, patient is clean, and equipment is clean. - Hallways are clear from equipment besides carts. - Fall bracelet on for fall risk patients  - Ensure room is clear and free of clutter  - Suction is set up for ALL patients (with lashay)  - Hallways are clear from equipment besides carts.    - Isolation precautions followed, supplies available outside room, sign posted

## 2020-01-08 NOTE — PROGRESS NOTES
Samuel faxed clinical updates on 1/6 for auth extension request.     Samuel called and left a message for pt's  requesting a decision or update. Samuel will follow.

## 2020-01-08 NOTE — PROGRESS NOTES
Problem: Mobility Impaired (Adult and Pediatric)  Goal: *Therapy Goal (Edit Goal, Insert Text)  Description  Physical Therapy Goals:  STG= LTG  Initiated 2019, revised 2020 and to be accomplished within 28 day(s) on 2020:   1. Patient will move from supine to sit and sit to supine with min A, and roll side to side in bed with contact guard assist.   2.  Patient will transfer from bed to chair and chair to bed with contact guard assist using lateral transfer board, equal heights; and min A with incline to higher surface (24\" height bed)  3. Patient will perform sit to stand with moderate assistance and least restrictive device. 4.  Patient will perform car transfer with lateral transfer board and min A.   5.  Patient will perform wheelchair mobility over even surfaces at mod independent level for at least 150 ft, including negotiation of doorways. 6.  Patient will perform wheelchair mobility up/down ramp, uneven sidewalk min A level. Outcome: Progressing Towards Goal   PHYSICAL THERAPY TREATMENT    Patient: Lisa Sinha (65 y.o. female)  Date: 2020  Diagnosis: Status post laminectomy with spinal fusion [Z98.1] Status post laminectomy with spinal fusion  Precautions: Fall, Spinal(thoracic)  Chart, physical therapy assessment, plan of care and goals were reviewed. Time in: 930  Time out: 1000  Pt seen for: txfr training, family training, w/c mobility  Time In: 1130  Time Out: 1230  Patient Seen For: Transfer training; Therapeutic exercise;Balance activities(bed mobility)  Pain:  Pt pain was reported as  8/10 pre-treatment. Pt pain was reported as 8/10 post-treatment. Intervention: NA     Patient identified with name and : yes     SUBJECTIVE:     Pt's caregiver was present for first 10 minutes of session and educated on slide board txfr. Pt reports 8/10 pain left side of back.      OBJECTIVE DATA SUMMARY:   Objective: Pt on room air for both sessions and maintained o2 sats 93-96%, except when pt supine o2 sats 82%. Pt required 2 minutes of pursed lip breathing technique to return to 92% once sitting. BED/MAT MOBILITY Daily Assessment    Rolling Right : 3 (Moderate assistance )  Supine to Sit : 3 (Moderate assistance)  Sit to Supine : 2 (Maximal assistance)  Pt performed bed mobility on right side of mat table. Pt required max assist, mod assist with BLE, for sit to right sidelying and to supine. Pt required mod assist with BLE rolling supine to right sidelying and mod assist, min assist with BLE and at trunk, for right sidelying to sitting. TRANSFERS Daily Assessment    Transfer Type: Lateral with transfer board  Transfer Assistance : 3 (Moderate assistance )  Pt performed slide board txfr w/c to bed to right side with mod assist. Pt's caregiver educated on txfr technique and assisting with BLE positioning. Caregiver performed return demo slide board txfr bed to w/c to left side with good safety and technique, following cues. Caregiver unable to stay and was not trained on any further mobility at this time. Pt performed slide board txfrs w/c<->23\" mat table with mod assist for anterior wt shift and mod/max positioning of BLE for safety. BALANCE Daily Assessment    Sitting - Static: Fair (occasional)  Sitting - Dynamic: Fair (occasional)       WHEELCHAIR MOBILITY Daily Assessment    Able to Propel (ft): 150 feet  Functional Level: 5  Wheelchair Setup Assist Required : 3 (Moderate assistance)  Wheelchair Management: Manages left brake;Manages right brake  Pt propelled w/c from room to back side of gym with BUE and S.        THERAPEUTIC EXERCISES Daily Assessment     Supine bridging 2x10 and hooklying hip abd/add x10       Neuro Re-Education:  Lateral reaching side to side x3 each side with mirror for visual feedback, with CGA/min assist and v/c to return to midline posture.      ASSESSMENT:  Pt continues to demo decreased dynamic sitting balance and midline orientation. Pt continues to require increased assistance for bed mobility to maintain spinal precautions. Pt's o2 sats maintained in normal limits on room air, even with activity, except during supine. Progression toward goals:  []      Improving appropriately and progressing toward goals  [x]      Improving slowly and progressing toward goals  []      Not making progress toward goals and plan of care will be adjusted     PLAN:  Patient continues to benefit from skilled intervention to address the above impairments. Continue treatment per established plan of care. Discharge Recommendations:  Home Health  Further Equipment Recommendations for Discharge:  wheelchair 18 inch and slide board and w/c ramp     Estimated LOS: 1/14/2020    Activity Tolerance:   fair  Please refer to the flowsheet for vital signs taken during this treatment. After treatment:   Patient left sitting in w/c in dining room for lunch.        Shaunna Hidalgo, PTA  1/8/2020

## 2020-01-09 NOTE — PROGRESS NOTES
Problem: Mobility Impaired (Adult and Pediatric)  Goal: *Therapy Goal (Edit Goal, Insert Text)  Description  Physical Therapy Goals:  STG= LTG  Initiated 2019, revised 2020 and to be accomplished within 28 day(s) on 2020:   1. Patient will move from supine to sit and sit to supine with min A, and roll side to side in bed with contact guard assist.   2.  Patient will transfer from bed to chair and chair to bed with contact guard assist using lateral transfer board, equal heights; and min A with incline to higher surface (24\" height bed)  3. Patient will perform sit to stand with moderate assistance and least restrictive device. 4.  Patient will perform car transfer with lateral transfer board and min A.   5.  Patient will perform wheelchair mobility over even surfaces at mod independent level for at least 150 ft, including negotiation of doorways. 6.  Patient will perform wheelchair mobility up/down ramp, uneven sidewalk min A level. Outcome: Progressing Towards Goal   PHYSICAL THERAPY TREATMENT    Patient: Martinez Sinha (65 y.o. female)  Date: 2020  Diagnosis: Status post laminectomy with spinal fusion [Z98.1] Status post laminectomy with spinal fusion  Precautions: Fall, Spinal(thoracic)  Chart, physical therapy assessment, plan of care and goals were reviewed. Time in: 1016  Time out: 1033  Time in: 1445  Time out: 1505  Time In: 1610  Time Out: 1640  Patient Seen For: Transfer training  Pain:  Pt pain was reported as  10/10 pre-treatment. Pt pain was reported as 10/10 post-treatment. Intervention: reported to nurse    Patient identified with name and : yes     SUBJECTIVE:     Pt perseverates during all 3 sessions on \"you know I've got a hemorrhoid? \" \"my rectum hurts\" and \"my back is 10/10 cause they twisted my back putting in bed last night\" but then told nurse her pain was in her rectum.      OBJECTIVE DATA SUMMARY:   Objective: pt -3 units of treatment for first session due to ADL took 30 minutes extra and pt had not eaten breakfast, requiring 45 minutes to complete meal. Pt -1 unit during 1430 session due to pt was working with OT to change soiled pants. PTA rescheduled pt for 4pm and requested pt stay on task and return to gym at 4pm in order to have pt responsible for recovering her PT time. Pt was meeting with rene when therapist was in search of pt at 12. TRANSFERS Daily Assessment    Transfer Type: Lateral with transfer board  Transfer Assistance : 3 (Moderate assistance )  Sit to Stand Assistance: Total assistance  Pt performed slide board txfr 22\" mat table<->w/c and 23\" mat table<->w/c with mod assist for anterior wt shift and mod/max assist for BLE repositioning and to block knees for safety. Pt required mod v/c for proper hand placement and for anterior wt shift with each scoot. Pt's feet continue to slide fwd when pt does not anteriorly wt shift properly. During second session, pt performed sit to stand from w/c to elevated bed, holding onto bed rail, with total assist, mod/max assist x2, and B knees blocked. During PM session, pt performed slide board txfr w/c<->BSC with mod assist. Pt required mod/max assist for LB clothing and brief management with pt performing side to side wt shifting. Pt required total assist for hygiene with BM with pt anteriorly wt shifting. Pt performed partial stand from [facility] with pt pushing up from B arm rests and therapist providing mod/max assist for anterior and up motion and blocking B knees. BALANCE Daily Assessment    Sitting - Static: Fair (occasional)  Sitting - Dynamic: Fair (occasional)  Standing - Static: Poor       ASSESSMENT:  Pt does not prioritize therapy and thus continued to miss PT time today, with multiple opportunities. Pt demo's lack of awareness of wasting scheduled therapy time with taking phone calls and stalling.  A large part of pt's therapy time is spent toileting pt or changing soiled clothes due to pt's poor insight to toileting needs until it is emergent. Progression toward goals:  []      Improving appropriately and progressing toward goals  [x]      Improving slowly and progressing toward goals  []      Not making progress toward goals and plan of care will be adjusted     PLAN:  Patient continues to benefit from skilled intervention to address the above impairments. Continue treatment per established plan of care. Discharge Recommendations:  Home Health  Further Equipment Recommendations for Discharge:  wheelchair 18 inch and slide board      Estimated LOS: 1/14/2020    Activity Tolerance:   fair  Please refer to the flowsheet for vital signs taken during this treatment. After treatment:   Patient left sitting in w/c in room with nurse present.        Emigdio Vale, PTA  1/9/2020

## 2020-01-09 NOTE — PROGRESS NOTES
[facility] PHYSICAL REHABILITATION  [address] [place] [address], [address]     INPATIENT REHABILITATION  DAILY PROGRESS NOTE     Date: 1/9/2020    Name: Fatuma Patel Age / Sex: 62 y.o. / female   CSN: [id number] MRN: [id number]   [facility] Date: 12/16/2019 Length of Stay: 24 days     Primary Rehab Diagnosis: Impaired Mobility and ADLs secondary to:  1. S/P T10, T9, T8 laminectomy; T7, T8, T9, T10, T11, T12 posterior thoracic fusion; segmental spinal instrumentation; K2M [place] type, T7-T8, T11-T12 (12/11/2019 - Dr. Lorena López)  2. History of acute compression fractures of body of thoracic vertebrae (T9 and T10) due to fall      Subjective:     Patient seen and examined. Blood pressure controlled. Blood glucose controlled. Team conference was held at bedside this PM.     Patient's Complaint:   No significant medical complaints    Pain Control: stable, mild-to-moderate joint symptoms intermittently, reasonably well controlled by current meds      Objective:     Vital Signs:  Patient Vitals for the past 24 hrs:   BP Temp Pulse Resp SpO2   01/09/20 0804 131/79 97.8 °F (36.6 °C) 64 18 97 %   01/08/20 2111 128/74 97 °F (36.1 °C) 68 18 96 %        Physical Examination:  GENERAL SURVEY: Patient is awake, alert, oriented x 3, sitting comfortably on the chair, not in acute respiratory distress. HEENT: pale palpebral conjunctivae, anicteric sclerae, no nasoaural discharge, moist oral mucosa  NECK: supple, no jugular venous distention, no palpable lymph nodes  CHEST/LUNGS: symmetrical chest expansion, good air entry, clear breath sounds  HEART: adynamic precordium, good S1 S2, no S3, regular rhythm, no murmurs  ABDOMEN: obese, bowel sounds appreciated, soft, non-tender  EXTREMITIES: pale nailbeds, no edema, full and equal pulses, no calf tenderness   NEUROLOGICAL EXAM: The patient is awake, alert and oriented x3, able to answer questions fairly appropriately, able to follow 1 and 2 step commands.   Able to tell time from the wall clock. Cranial nerves II-XII are grossly intact. No gross sensory deficit. Motor strength is 4-/5 on BUE, 2+/5 on the RLE (except for 1/5 on the right ankle), 2-/5 on the LLE (except for 2+/5 on the left knee).      Incision(s): healing well, clean, dry, and intact and serosanguineous       Current Medications:  Current Facility-Administered Medications   Medication Dose Route Frequency    cholestyramine-aspartame (QUESTRAN LIGHT) packet 4 g  4 g Oral TID WITH MEALS    busPIRone (BUSPAR) tablet 5 mg  5 mg Oral TID    pantoprazole (PROTONIX) tablet 40 mg  40 mg Oral ACB    calcium carbonate (TUMS) chewable tablet 200 mg [elemental]  200 mg Oral TID PRN    insulin glargine (LANTUS) injection 25 Units  25 Units SubCUTAneous ACB    insulin lispro (HUMALOG) injection 4 Units  4 Units SubCUTAneous TIDAC    lubiPROStone (AMITIZA) capsule 24 mcg  24 mcg Oral DAILY WITH BREAKFAST    guaiFENesin ER (MUCINEX) tablet 600 mg  600 mg Oral Q12H    acetaminophen (TYLENOL) tablet 650 mg  650 mg Oral Q4H PRN    bisacodyl (DULCOLAX) tablet 10 mg  10 mg Oral Q48H PRN    ferrous sulfate tablet 325 mg  1 Tab Oral DAILY WITH BREAKFAST    ascorbic acid (vitamin C) (VITAMIN C) tablet 250 mg  250 mg Oral DAILY WITH BREAKFAST    insulin lispro (HUMALOG) injection   SubCUTAneous TIDAC    senna-docusate (PERICOLACE) 8.6-50 mg per tablet 2 Tab  2 Tab Oral PCD    methadone (DOLOPHINE) tablet 20 mg  20 mg Oral DAILY    oxyCODONE-acetaminophen (PERCOCET 10)  mg per tablet 1 Tab  1 Tab Oral Q4H PRN    predniSONE (DELTASONE) tablet 30 mg  30 mg Oral DAILY WITH BREAKFAST    rosuvastatin (CRESTOR) tablet 5 mg  5 mg Oral QHS    albuterol (PROVENTIL VENTOLIN) nebulizer solution 2.5 mg  2.5 mg Nebulization Q4H PRN    levothyroxine (SYNTHROID) tablet 75 mcg  75 mcg Oral 6am    cholecalciferol (VITAMIN D3) (400 Units /10 mcg) tablet 5 Tab  2,000 Units Oral DAILY    calcium citrate tablet 200 mg [elemental] 200 mg Oral BID    divalproex DR (DEPAKOTE) tablet 500 mg  500 mg Oral QHS    fluticasone-vilanterol (BREO ELLIPTA) 100mcg-25mcg/puff  1 Puff Inhalation DAILY    oxybutynin (DITROPAN) tablet 5 mg  5 mg Oral BID    allopurinol (ZYLOPRIM) tablet 100 mg  100 mg Oral DAILY    sertraline (ZOLOFT) tablet 50 mg  50 mg Oral DAILY    methocarbamol (ROBAXIN) tablet 500 mg  500 mg Oral Q8H    aspirin chewable tablet 81 mg  81 mg Oral DAILY WITH BREAKFAST    docusate sodium (COLACE) capsule 100 mg  100 mg Oral DAILY AFTER BREAKFAST       Allergies:   Allergies   Allergen Reactions    Moxifloxacin Rash    Pcn [Penicillins] Swelling    Sulfa (Sulfonamide Antibiotics) Swelling       Functional Progress:    OCCUPATIONAL THERAPY    ON ADMISSION MOST RECENT   Eating  Functional Level: 5   Eating  Functional Level: 5     Grooming  Functional Level: 4   Grooming  Functional Level: 4     Bathing  Functional Level: 2   Bathing  Functional Level: 2     Upper Body Dressing  Functional Level: 3   Upper Body Dressing  Functional Level: 3     Lower Body Dressing  Functional Level: 1   Lower Body Dressing  Functional Level: 1     Toileting  Functional Level: 1   Toileting  Functional Level: 1     Toilet Transfers  Toilet Transfer Score: 0   Toilet Transfers  Toilet Transfer Score: 1     Tub /Shower Transfers  Tub/Shower Transfer Score: 0   Tub/Shower Transfers  Tub/Shower Transfer Score: 0       Legend:   7 - Independent   6 - Modified Independent   5 - Standby Assistance / Supervision / Set-up   4 - Minimum Assistance / Contact Guard Assistance   3 - Moderate Assistance   2 - Maximum Assistance   1 - Total Assistance / Dependent       Lab/Data Review:  Recent Results (from the past 24 hour(s))   GLUCOSE, POC    Collection Time: 01/08/20  4:54 PM   Result Value Ref Range    Glucose (POC) 170 (H) 70 - 110 mg/dL   GLUCOSE, POC    Collection Time: 01/08/20  8:17 PM   Result Value Ref Range    Glucose (POC) 120 (H) 70 - 110 mg/dL GLUCOSE, POC    Collection Time: 01/09/20  8:06 AM   Result Value Ref Range    Glucose (POC) 112 (H) 70 - 110 mg/dL   GLUCOSE, POC    Collection Time: 01/09/20 11:35 AM   Result Value Ref Range    Glucose (POC) 128 (H) 70 - 110 mg/dL       Estimated Glomerular Filtration Rate:  On admission, estimated GFR based on a Creatinine of 0.68 mg/dl:              Using CKD-EPI = 107.9 mL/min/1.73m2              Using MDRD = 112.4 mL/min/1.73m2  Most recent estimated GFR, based on a Creatinine of 0.84 mg/dl on 1/6/2020:   Using CKD-EPI = 85.7 mL/min/1.73m2   Using MDRD = 88.8 mL/min/1.73m2       Assessment:     Primary Rehabilitation Diagnosis  1. Impaired Mobility and ADLs  2. S/P T10, T9, T8 laminectomy; T7, T8, T9, T10, T11, T12 posterior thoracic fusion; segmental spinal instrumentation; K2M [place] type, T7-T8, T11-T12 (12/11/2019 - Dr. Jennifer Arora)  3.  History of acute compression fractures of body of thoracic vertebrae (T9 and T10) due to fall     Comorbidities   Diabetic neuropathy associated with type 2 diabetes mellitus (HCC) E11.40    Venous insufficiency I87.2    Obesity, Class I, BMI 30-34.9 E66.9    Chronic back pain M54.9, G89.29    Generalized osteoarthritis of multiple sites M15.9    Bipolar affective disorder  F31.9    Abnormally low high density lipoprotein (HDL) cholesterol with hypertriglyceridemia E78.6, P63.5    Diastolic dysfunction without heart failure I51.89    Chronic obstructive pulmonary disease (COPD)  J44.9    Hypertensive heart disease without heart failure I11.9    Depression F32.9    History of back injury Z87.828    Type 2 diabetes mellitus with diabetic neuropathy, with long-term current use of insulin  E11.40, Z79.4    Anxiety F41.9    Wears glasses Z97.3    History of hepatitis C Z86.19    Sickle cell trait D57.3    History of acute renal failure Z87.448    Hypothyroidism E03.9    Recurrent genital herpes A60.00    Sarcoidosis D86.9    Abscess of right arm L02.413  Hypoxemia requiring supplemental oxygen R09.02, Z99.81    Abnormal nuclear stress test R94.39    Cellulitis and abscess of hand L03.119, L02.519    Cellulitis and abscess of foot L03.119, L02.619    Cellulitis of arm, right L03. 113    Olecranon bursitis of right elbow M70.21    Contusion of left elbow S50. 02XA    Intravenous drug user F19.90    Right Achilles tendinitis M76.61    History of penicillin allergy Z88.0    Falls frequently R29.6    Gastroesophageal reflux disease with hiatal hernia K21.9, K44.9    Memory difficulty R41.3    Mixed connective tissue disease  M35.1    Hyperuricemia E79.0    History of vitamin D deficiency Z86.39    Urge urinary incontinence N39.41    Acute blood loss as cause of postoperative anemia D62    History of fracture due to fall Z87.81    Chronic respiratory failure with hypoxia  J96.11    Cellulitis of right forearm L03. 113    Gout M10.9    Menopause Z78.0    Long term current use of aspirin Z79.82    Opioid dependence  F11.20    Tobacco use disorder F17.200        Plan:     1. Justification for continued stay: Good progression towards established rehabilitation goals. 2. Medical Issues being followed closely:    [x]  Fall and safety precautions     [x]  Wound Care     [x]  Bowel and Bladder Function     [x]  Fluid Electrolyte and Nutrition Balance     [x]  Pain Control      3. Issues that 24 hour rehabilitation nursing is following:    [x]  Fall and safety precautions     [x]  Wound Care     [x]  Bowel and Bladder Function     [x]  Fluid Electrolyte and Nutrition Balance     [x]  Pain Control      [x]  Assistance with and education on in-room safety with transfers to and from the bed, wheelchair, toilet and shower. 4. Acute rehabilitation plan of care:    [x]  Continue current care and rehab. [x]  Physical Therapy           [x]  Occupational Therapy           []  Speech Therapy     []  Hold Rehab until further notice     5. Medications:    [x]  MAR Reviewed     [x]  Continue Present Medications     6. DVT Prophylaxis:      []  Lovenox     []  Unfractionated Heparin     []  Coumadin     []  NOAC     [x]  ROBERT Stockings     []  Sequential Compression Device     []  None     7. Orders:   > S/P T10, T9, T8 laminectomy; T7, T8, T9, T10, T11, T12 posterior thoracic fusion; segmental spinal instrumentation; K2M [place] type, T7-T8, T11-T12 (12/11/2019 - Dr. Ish Joshi);  History of acute compression fractures of body of thoracic vertebrae (T9 and T10) due to fall   > Thoracic spinal precautions   > Continue:    > Calcium citrate 200 mg PO BID    > Cholecalciferol 2,000 units PO once daily    > Acute Postoperative Blood Loss Anemia   > Anemia work-up (12/13/2019) showed serum iron 11, TIBC 215, iron % saturation 5, ferritin 146   > Hgb/Hct (12/17/2019, on admission) = 7.4/23.1   > Reticulocyte count (12/17/2019) = 2.5   > Hgb/Hct (12/23/2019) = 8.5/26.7    > Hgb/Hct (12/30/2019) = 9.8/31.4    > Hgb/Hct (1/4/2020) = 10.9/35.5   > Hgb/Hct (1/6/2020) = 10.7/34.1    > Continue:    > FeSO4 325 mg PO once daily with breakfast     > Ascorbic Acid 250 mg PO once daily with breakfast (to enhance the absorption of the FeSO4)     > Benign hypertensive heart and kidney disease with stage 3 chronic kidney disease without congestive heart failure   > Prior to admission, the patient stated she was on Metolazone 2.5 mg PO q Mon-Wed-Fri   > Metolazone was not given during the patient's hospital stay at [facility]    > Upon admission to the ARU, held Metolazone     > Chronic obstructive pulmonary disease; Chronic respiratory failure with hypoxemia requiring supplemental oxygen (3 LPM)   > Continue:    > Fluticasone-Vilanterol 100-25 1 puff inhaled once daily    > O2 inhalation at 3 LPM via nasal cannula continuous    > Opioid dependence   > Continue Methadone 20 mg PO once daily    > Type 2 diabetes mellitus with stage 3 chronic kidney disease   > HbA1c (12/11/2019) = 7.1   > On 12/22/2019:    > Increased Insulin glargine from 20 units to 22 units PO once daily before breakfast    > Started Insulin lispro 3 units SC TID AC    > On 12/24/2019, increased Insulin lispro from 3 units to 4 units SC TID AC    > On 12/25/2019, increased Insulin glargine from 22 units to 25 units PO once daily before breakfast   > Continue:    > Insulin glargine 25 units PO once daily before breakfast    > Insulin lispro 4 units SC TID AC     > Insulin lispro sliding scale SC TID AC only    > Cough   > On 12/18/2019, started Guaifenesin  mg PO q 12 hr   > Continue Guaifenesin  mg PO q 12 hr    > Constipation   > On 12/17/2019, started:    > Docusate sodium 100 mg PO once daily after breakfast    > PeriColace 2 tabs PO once daily after dinner   > On 12/22/2019, started Lubiprostone 24 mcg PO once daily with breakfast   > Discontinue Lubiprostone 24 mcg PO once daily with breakfast   > Continue:    > Docusate sodium 100 mg PO once daily after breakfast    > PeriColace 2 tabs PO once daily after dinner    > Gastroesophageal reflux disease   > On 1/2/2019, Famotidine 20 mg PO BID was changed to Pantoprazole 40 mg PO once daily before breakfast   > Continue Pantoprazole 40 mg PO once daily before breakfast    > Anxiety   > On 1/1/2020, started Buspirone 5 mg PO TID   > Continue Buspirone 5 mg PO TID    > Analgesia   > Continue:    > Acetaminophen 650 mg PO q 4 hr PRN for pain level less than 5/10    > Methocarbamol 500 mg PO q 8 hr    > Percocet 10/325 1 tab PO q 4 hr PRN for pain level greater than 4/10     > Diet:   > Specifications: Cardiac, diabetic consistent carb 1800 kcal, no concentrated sweets, low purine   > Solids (consistency): Regular    > Liquids (consistency): Thin       8. Patient's progress in rehabilitation and medical issues discussed with the patient. All questions answered to the best of my ability.  Care plan discussed with patient and nurse.       Signed:    Eulice Dance, MD    January 9, 2020

## 2020-01-09 NOTE — PROGRESS NOTES
Problem: Self Care Deficits Care Plan (Adult)  Goal: *Therapy Goal (Edit Goal, Insert Text)  Description  Occupational Therapy Goals   Long Term Goals  Initiated 19 and to be accomplished within 4 week(s)  to facilitate increased self care independence and strength for return to PLOF and decreased care giver burden:   2. Pt will perform grooming with Mod I.   3. Pt will perform UB bathing with SBA. 4. Pt will perform LB bathing with Tyree. 5. Pt will perform tub/shower transfer <> TTB with Min A.   6. Pt will perform UB dressing with Set-up. 7. Pt will perform LB dressing with Min A.  8. Pt will perform toileting task with Min A.  9. Pt will perform toilet transfer with Min A/CGA. Short Term Goals   Initiated 19 and to be accomplished within 7 day(s) (2020) to facilitate increased self care independence and strength for return to PLOF and decreased care giver burden:   1. Pt will perform self-feeding with Mod I.   2. Pt will perform grooming with set-up. ()  3. Pt will perform UB bathing with Mod A. ()  4. Pt will perform LB bathing with Mod A in LRE. ()  5. Pt will perform tub/shower transfer <> TTB with MaxA x 1.-   6. Pt will perform UB dressing with SBA. -   7. Pt will perform LB dressing with Mod A using AE as needed. ()  8. Pt will perform toileting task with Max A x 1. ()  9. Pt will perform toilet transfer with MaxA x 1.- 20 UG Mod assist         Outcome Measures:  (19) Dynamometer  strength: Right= 27.33# (norm=55. 1#) Left= 34.67# (norm=45.7#)  (19) 9-hole peg test: Right=45.46 seconds ; Left=41.13 seconds         Outcome: Not Progressing Towards Goal  Goal: *Therapy Goal (Edit Goal, Insert Text)  Outcome: Not Progressing Towards Goal  Goal: Interventions  Outcome: Not Progressing Towards Goal  OCCUPATIONAL THERAPY TREATMENT    Patient: Abhijeet Puente Roots   62 y.o.     Patient identified with name and : yes    Date: 2020    First Tx Session  Time In: 0  Time Out[de-identified] 750    Second Tx Session  Time In: 215  Time Out[de-identified] 245    Diagnosis: Status post laminectomy with spinal fusion [Z98.1]   Precautions: Fall, Spinal(thoracic)  Chart, occupational therapy assessment, plan of care, and goals were reviewed. Pain:  Pt reports 10/10 pain or discomfort prior to treatment. Pt reports 10/10 pain or discomfort post treatment. Intervention Provided: None      SUBJECTIVE:   Patient stated  of having pain in her back and buttocks.     OBJECTIVE DATA SUMMARY:   Pt seen for AM bath. Pt assist with LB bathing with LH sponge in long sitting and UB bathing EOB. See below for ADL's functional levels. PM: OT spoke with pt's daughter regarding con't family education. Per daughter Milan Louise) pt's Aid will be present on the Unit Friday morning  1/10/20 @ 8 am for family education. GROOMING Daily Assessment    Grooming  Grooming Assistance : 6 (Modified independent)  Comments: Pt performed grooming tasks from wheel chair level  at sink. UPPER BODY BATHING Daily Assessment    Upper Body Bathing  Bathing Assistance, Upper: 5 (Supervision)  Position Performed: Seated edge of bed with good sitting balance  Comments: Pt set-up EOB performing sponge bath and applying deodorant . LOWER BODY BATHING Daily Assessment    Lower Body Bathing  Bathing Assistance, Lower : 3 (Moderate assistance )  Adaptive Equipment: Long handled sponge  Position Performed: Long sitting on bed  Comments: Pt set-up in long sitting washing LE's with LH sponge and tyrell area with washcloth. Pt than rolled to have buttocks washed dependent. UPPER BODY DRESSING Daily Assessment    Upper Body Dressing   Dressing Assistance : 5 (Supervision)  Comments: Pt doff/shante under shirt and shirt EOB with good sitting balance.      LOWER BODY DRESSING Daily Assessment    Lower Body Dressing   Dressing Assistance : 3 (Moderate assistance)  Leg Crossed Method Used: No  Position Performed: Long sitting on bed;Seated edge of bed  Adaptive Equipment Used: Long handled shoe horn;Reacher  Don/Doff Anti-Embolic Stockings: 1 (Total assistance)  Comments: Pt needed assist threading LE's into pants leg 2/2 small pants legs. pt donning pants from knee to waist over stomach with supervision. Than rolled side to side while pants donned over buttocks dependently. PM Session: 3 (Moderate assistance Position Performed: Seated in chair;Standing) Min assist doff/shante pants off/on feet 2/2 shoe on. Pt provided grossly min assist doffing/donning pants off/on hips and partially off buttocks. In standing with PT/OT mod assist blocking both knees and pt using bedside raise support as pt's pants donned over buttocks dependent     MOBILITY/TRANSFERS Daily Assessment     Pt performed bed mobility to EOB mod assist and cues for log roll. Grossly min assist with verbal cues for  and hand placement. ASSESSMENT: Pt increases participation with self care with increase time, challenges and cuing to attend to and stay on tasks. Pt consistently c/o of spasm and back pain however, con't to work through the pain. Progression toward goals:  [x]          Improving appropriately and progressing toward goals  []          Improving slowly and progressing toward goals  []          Not making progress toward goals and plan of care will be adjusted     PLAN:  Patient continues to benefit from skilled intervention to address the above impairments. Continue treatment per established plan of care. Discharge Recommendations: Home Health   Further Equipment Recommendations for Discharge: Drop arm commode     Activity Tolerance:  Fair       Estimated LOS:1/16/2020  Please refer to the flow sheet for vital signs taken during this treatment.   After treatment:   [x]  Patient left in no apparent distress sitting up in chair   []  Patient left in no apparent distress in bed  [x]  Call bell left within reach  []  Nursing notified  []  Caregiver present  [x] Hand off pt to PTin room    COMMUNICATION/EDUCATION:   [] Home safety education was provided and the patient/caregiver indicated understanding. [] Patient/family have participated as able in goal setting and plan of care. [x] Patient/family agree to work toward stated goals and plan of care. [] Patient understands intent and goals of therapy, but is neutral about his/her participation. [] Patient is unable to participate in goal setting and plan of care.       Jane FORREST  1/9/2020

## 2020-01-09 NOTE — INTERDISCIPLINARY ROUNDS
[facility] PHYSICAL REHABILITATION  [address] [place] [address] SUMMARY     Date of Conference: 1/9/2020    Patient Information:        Name: Darcy Hernandez Age / Sex: 62 y.o. / female   CSN: [id number] MRN: [id number]   [facility] Date: 12/16/2019 Length of Stay: 24 days     Primary Rehabilitation Diagnosis  1. Impaired Mobility and ADLs  2. S/P T10, T9, T8 laminectomy; T7, T8, T9, T10, T11, T12 posterior thoracic fusion; segmental spinal instrumentation; K2M [place] type, T7-T8, T11-T12 (12/11/2019 - Dr. Rigoberto Morales)  3. History of acute compression fractures of body of thoracic vertebrae (T9 and T10) due to fall     Comorbidities   Diabetic neuropathy associated with type 2 diabetes mellitus (HCC) E11.40    Venous insufficiency I87.2    Obesity, Class I, BMI 30-34.9 E66.9    Chronic back pain M54.9, G89.29    Generalized osteoarthritis of multiple sites M15.9    Bipolar affective disorder  F31.9    Abnormally low high density lipoprotein (HDL) cholesterol with hypertriglyceridemia E78.6, T79.4    Diastolic dysfunction without heart failure I51.89    Chronic obstructive pulmonary disease (COPD)  J44.9    Hypertensive heart disease without heart failure I11.9    Depression F32.9    History of back injury Z87.828    Type 2 diabetes mellitus with diabetic neuropathy, with long-term current use of insulin  E11.40, Z79.4    Anxiety F41.9    Wears glasses Z97.3    History of hepatitis C Z86.19    Sickle cell trait D57.3    History of acute renal failure Z87.448    Hypothyroidism E03.9    Recurrent genital herpes A60.00    Sarcoidosis D86.9    Abscess of right arm L02.413    Hypoxemia requiring supplemental oxygen R09.02, Z99.81    Abnormal nuclear stress test R94.39    Cellulitis and abscess of hand L03.119, L02.519    Cellulitis and abscess of foot L03.119, L02.619    Cellulitis of arm, right L03. 113    Olecranon bursitis of right elbow M70.21    Contusion of left elbow S50. 02XA    Intravenous drug user F19.90    Right Achilles tendinitis M76.61    History of penicillin allergy Z88.0    Falls frequently R29.6    Gastroesophageal reflux disease with hiatal hernia K21.9, K44.9    Memory difficulty R41.3    Mixed connective tissue disease  M35.1    Hyperuricemia E79.0    History of vitamin D deficiency Z86.39    Urge urinary incontinence N39.41    Acute blood loss as cause of postoperative anemia D62    History of fracture due to fall Z87.81    Chronic respiratory failure with hypoxia  J96.11    Cellulitis of right forearm L03. 113    Gout M10.9    Menopause Z78.0    Long term current use of aspirin Z79.82    Opioid dependence  F11.20    Tobacco use disorder F17.200          Therapy:     FIM SCORES Initial Assessment Weekly Progress Assessment 1/9/2020   Eating Functional Level: 5  Comments: Pt requiring encouragement to demo container mgmt I'ly, requiring set-up over all. Pt able to demo utensil use I'ly  5-supervision   Swallowing     Grooming 4 Grooming Assistance : 6 (Modified independent)  Comments: Pt performed grooming tasks from wheel chair level  at sink. 6-mod independent   Bathing 2 Bathing Assistance, Upper: 5 (Supervision)  Position Performed: Seated edge of bed  Comments: Pt set-up EOB performing sponge bath and applying deodorant . Bathing Assistance, Lower : 3 (Moderate assistance )  Adaptive Equipment: Long handled sponge  Position Performed: Long sitting on bed  Comments: Pt set-up in long sitting washing LE's with LH sponge and tyrell area with wash cloth. Pt than rolled to have buttocks washed dependent. Upper Body Dressing Functional Level: 3  Items Applied/Steps Completed: Pullover (4 steps)  Comments: Pt requiring modA to [facility] gown and modA to shante t-shirt. Pt unable to pull down shirt anterior/posterior.   Dressing Assistance : 5 (Supervision)  Comments: Pt doff/shante under shirt and shirt EOB with good sitting balance. 5- set up   Lower Body Dressing Functional Level: 1  Items Applied/Steps Completed: Sock, left (1 step), Sock, right (1 step), Elastic waist pants (3 steps), Underpants (3 steps)  Comments: Pt requiring total asst for compression stockings/sock mgmt with pt seated EOB. Pt rolling side:side with maxA with rehab tech present to asst with brief mgmt and donning pants. Pt attempting to bridge hips to shante pants over hips with pt unable to fully clear sacrum. 3-mod assist   Toileting Functional Level: 1  Comments: Pt incontinent of bladder/bowel, requiring total asst for hygiene and brief mgmt from bed level. 2 max assist   Bladder  level of assist 1     Bladder  accident frequency score 1     Bowel  level of assist 1     Bowel  accident frequency score 1     Toilet Transfer [place] Toilet Transfer Score: 0  Comments: NT at this time    3 (Mod A)   Tub/Shower Transfer [place] Tub or Shower Type: Tub/Shower combination  Tub/Shower Transfer Score: 0  Comments: NT at this time due to safety and medical status      0   Comprehension Primary Mode of Comprehension: Auditory  Score: 6  Comments: occasional repetition needed        Expression Primary Mode of Expression: Verbal  Score: 6        Social Interaction Score: 5  Comments: re-direction to task at times     Problem Solving Score: 4  Comments: min cues for following spinal precautions     Memory Score: 5       FIM SCORES Initial Assessment Weekly Progress Assessment 1/9/2020   Bed/Chair/Wheelchair Transfers Transfer Type: Lateral with transfer board  Other: also lateral w/transfer board to/from drop arm bsc, w/ min A  Transfer Assistance : 1 (Total assistance)(Max A x 2 for safety, sequencing, appropriate ant trunk )  Sit to Stand Assistance:  Total assistance  Car Transfers: Not tested  Car Type: N/A Transfer Type: Lateral with transfer board  Transfer Assistance : 3 (Moderate assistance )   Bed Mobility Rolling Right 2 (Maximal assistance)   Rolling Left 2 (Maximal assistance)   Supine to Sit 2 (Maximal assistance)   Sit to Stand Total assistance   Sit to Supine 2 (Maximal assistance)    Rolling Right       Rolling Left       Supine to Sit       Sit to Stand       Sit to Supine          Locomotion (W/C) Able to Propel (ft): 140 feet(max A for steering)  Functional Level: 2  Curbs/Ramps Assist Required (FIM Score): 0 (Not tested)  Wheelchair Setup Assist Required : 2 (Maximal assistance)  Wheelchair Management: Manages left brake(needing assist with brakes) Function    Setup Assistance         Locomotion (W/C distance) 140 Feet(max A for steering appropriately)     Locomotion (Walk) 0 (Not tested)  NA      Locomotion (Walk dist.) 0 Feet (ft)     Steps/Stairs Steps/Stairs Ambulated (#): 0  Level of Assist : 0 (Not tested)  Rail Use: (N/A)  NA         Nursing:     Neuro:   AAA&O x 4           Respiratory:   [] WNL   [x] O2 LPM:   Other:  Peripheral Vascular:   [x] TEDS present   [x] Edema present _2___ Grade   Cardiac:   [x] WNL   [] Other  Genitourinary:   [] continent   [x] incontinent   [] rousseau  Abdominal _1/08______ LBM  GI: _Cardiac soft solids______ Diet _Thin_____ Liquids _____ tube feeds  Musculoskeletal: ____ ROM Transfers __slide board and wheelchair___ Assistive Device Used  __max__ Level of Assistance  Skin Integumentary:   [] Intact   [] Not Intact   __________Preventative Measures  Details___Upper mid back incision  healing ___________________________________________________________  Pain: [x] Controlled   [] Not Controlled   Pain Meds:   [] Scheduled   [x] PRN        Registered Dietitian / Nutrition:   No data found. Pt unavailable at time of visit. Had reported good appetite and meal intake during previous RD visit.  Will continue to monitor     Supplements:          [] Yes   [x] No      Amount of supplement consumed: not applicable      No intake or output data in the 24 hours ending 01/09/20 5035 Last bowel movement: 1/8      Interdisciplinary Team Goals:     1. Discipline  Physical Therapy    Goal  Pt will perform bed mobility with mod assist during family training to maintain spinal precautions. Barrier  BLE weakness, decreased core stability, pain, decreased compliance with spinal precautions. Intervention  slide board txfr training, bed mobility, balance activities, w/c mobility, therapeutic exercises    Goal written by:   Cheryll Fishman, LPTA [place], PT, DPT     2. Discipline  Occupational Therapy    Goal  pt to re-call and carryover functional activities with toileting  tasks while following lumbar precaution     Barrier  Decrease independency with self care/ toileting tasks for example, hygiene and CM with weight shifting     Intervention  Strengthening and education    Goal written by:  SABINE Marley/L     3. Discipline  Speech Therapy    Goal      Barrier      Intervention      Goal written by:       4. Discipline  Nursing    Goal  By discharge pt will sense the urge to void / bowel movement and notify nurse before becoming incontient    Barrier  Patient waits to call, weak bladder    Intervention  Toilet every 2 hours,      Goal written by:  Mikel Anne LPN     5. Discipline  Clinical Psychology    Goal  Maintain focus to task assignment with maximum effort toward independent function, as appropriate    Barrier  Off task and sometimes distracted/preoccupied    Intervention  Behavioral cues and prompts and redirection    Goal written by:  Carlos Smith, PhD     6. Discipline  Nutrition / Dietetics    Goal  - PO nutrition intake will continue to meet >75% of patients estimated nutritional needs over the next 7 days. Outcome: Progressing towards goal      Barrier  none known     Intervention  continue po diet. Monitor meal intake    Goal written by:  Zonia Reddy RD       Disposition / Discharge Planning:      Follow-up services:  [x] Physical Therapy [x] Occupational Therapy       [] Speech Therapy           [] Skilled Nursing      [] Medical Social Worker   [] Aide        [] Outpatient      [] vs   [x] Home Health  [] vs       [] to progress to outpatient       [] with 24-hour supervision       [x] with 24-hour assistance   [] [place]   [facility] recommendations: Drop arm commode, 18\" w/c, slide board, w/c ramp   Estimated discharge date:  1/14/2020   Discharge Location:  [x] Home  [] versus    [] [facility] Caldwell    [] [address]   [] Other:           Interdisciplinary team rounds were held this PM with the following team members:       Name   Physical Therapist    Estuardo Meade, PT, DPT   Occupational Therapist    Miller Loyola, OTR/L   Recreational Therapist    Thiago Cedillo, [address] [facility] MSN RN AGCNS   Physician    Brandon Wade MD        Bola Alfaro MSW       Signed:  Brandon Wade MD    January 9, 2020

## 2020-01-09 NOTE — PROGRESS NOTES
SHIFT CHANGE NOTE FOR [facility]    Bedside and Verbal shift change report given to Chin Mancia RN (oncoming nurse) by Jeanelle Cheadle (offgoing nurse). Report included the following information SBAR, Kardex, MAR and Recent Results.     Situation:   Code Status: Full Code   Reason for Admission: Spinal Thoracic laminectomy T 6-T [address] Day: 24   Problem List:   Hospital Problems  Date Reviewed: 1/8/2020          Codes Class Noted POA    Chronic respiratory failure with hypoxia (HCC) (Chronic) ICD-10-CM: J96.11  ICD-9-CM: 518.83, 799.02  Unknown Yes    Overview Signed 12/16/2019 10:11 PM by Catina Wick MD     On home oxygen inhalation at 3 LPM via NC             Opioid dependence ([facility] (Chronic) ICD-10-CM: F11.20  ICD-9-CM: 304.00  Unknown Yes    Overview Signed 12/16/2019 10:54 PM by Catina Wick MD     On Methadone             Acute blood loss as cause of postoperative anemia ICD-10-CM: D62  ICD-9-CM: 285.1  12/12/2019 Yes        Impaired mobility and ADLs ICD-10-CM: Z74.09  ICD-9-CM: 799.89  12/11/2019 Yes        Spinal cord compression ([facility] ICD-10-CM: G95.20  ICD-9-CM: 336.9  12/11/2019 Yes        Compression fracture of body of thoracic vertebra (HCC) ICD-10-CM: S22.000A  ICD-9-CM: 805.2  12/11/2019 Yes        * (Principal) Status post laminectomy with spinal fusion ICD-10-CM: Z98.1  ICD-9-CM: V45.4  12/11/2019 Yes    Overview Signed 12/16/2019 10:05 PM by Catina Wick MD     S/P T10, T9, T8 laminectomy; T7, T8, T9, T10, T11, T12 posterior thoracic fusion; segmental spinal instrumentation; K2M [place] type, T7-T8, T11-T12 (12/11/2019 - Dr. Jamel Yi)             History of fracture due to fall ICD-10-CM: Z87.81  ICD-9-CM: V15.51  12/11/2019 Yes        Hypoxemia requiring supplemental oxygen (Chronic) ICD-10-CM: R09.02, Z99.81  ICD-9-CM: 799.02  12/29/2014 Yes        Chronic obstructive pulmonary disease (COPD) (HCC) (Chronic) ICD-10-CM: J44.9  ICD-9-CM: 496  Unknown Yes    Overview Signed 4/23/2013 10:01 AM by Santy Nelson     SOB, on paula O2  Recent admission with psychosis             Hypertensive heart disease without heart failure (Chronic) ICD-10-CM: I11.9  ICD-9-CM: 402.90  Unknown Yes    Overview Signed 4/23/2013 10:02 AM by Rojelio CALLES     Better controlled             Type 2 diabetes mellitus with diabetic neuropathy, with long-term current use of insulin ([facility] (Chronic) ICD-10-CM: E11.40, Z79.4  ICD-9-CM: 250.60, 357.2, V58.67  Unknown Yes    Overview Signed 12/18/2019  2:13 PM by Mg Herring MD     HbA1c (12/11/2019) = 7.1                   Background:   Past Medical History:   Past Medical History:   Diagnosis Date    Abnormally low high density lipoprotein (HDL) cholesterol with hypertriglyceridemia     Lipid profile (11/6/2016) showed , , HDL 38, LDL 37    Acute blood loss as cause of postoperative anemia 12/12/2019    Anxiety     Bipolar affective disorder ([facility] 12/5/2012    Cellulitis of right forearm 05/04/2017    Chronic back pain     Chronic obstructive pulmonary disease (COPD) (HCC)     SOB, on [place] O2 Recent admission with psychosis     Chronic respiratory failure with hypoxia (HCC)     On home oxygen inhalation at 3 LPM via NC    Contusion of left elbow 5/4/2017    Depression     Diabetic neuropathy associated with type 2 diabetes mellitus (HCC)     Diastolic dysfunction without heart failure     Stable on diuretics     Falls frequently     Gastroesophageal reflux disease with hiatal hernia     Generalized osteoarthritis of multiple sites     Gout     On Allopurinol    History of acute renal failure 5/31/2013    History of back injury     jumped out of second story window     History of fracture due to fall 12/11/2019    History of hepatitis C     treated    History of penicillin allergy     History of vitamin D deficiency 5/10/2017    Hypertensive heart disease without heart failure     Better controlled     Hyperuricemia 5/26/2017    Hypothyroidism     Hypoxemia requiring supplemental oxygen 12/29/2014    Intravenous drug user 5/2/2017    Long term current use of aspirin     Memory difficulty     Menopause     Mixed connective tissue disease ([facility] 5/10/2017    Obesity, Class I, BMI 30-34.9     Olecranon bursitis of right elbow 5/4/2017    Opioid dependence ([facility]     On Methadone    Recurrent genital herpes 5/31/2013    Right Achilles tendinitis 5/2/2017    Sarcoidosis     Sickle cell trait ([facility]     Tobacco use disorder     Type 2 diabetes mellitus with diabetic neuropathy, with long-term current use of insulin ([facility])     HbA1c (12/11/2019) = 7.1    Urge urinary incontinence 5/10/2017    Venous insufficiency     Wears glasses       Patient taking anticoagulants no    Patient has a defibrillator: no     Assessment:   Changes in Assessment throughout shift: None     Patient has central line: no Reasons if yes: Removed 12/16/19  Insertion date:n/a Last dressing date:n/a   Patient has Renae Cath: no Reasons if yes: n/a   Insertion date:n/a     Last Vitals:     Vitals:    01/07/20 1552 01/07/20 2122 01/08/20 0725 01/08/20 2111   BP: 105/65 117/69 120/70 128/74   Pulse: 80 73 68 68   Resp: 18 18 18 18   Temp: 97.7 °F (36.5 °C) 98 °F (36.7 °C) 98 °F (36.7 °C) 97 °F (36.1 °C)   SpO2: 96% 93% 96% 96%   Weight:       LMP: 11/25/2012        PAIN    Pain Assessment    Pain Intensity 1: 8 (01/09/20 0535) Pain Intensity 1: 2 (12/29/14 1105)    Pain Location 1: Back Pain Location 1: Abdomen    Pain Intervention(s) 1: Medication (see MAR) Pain Intervention(s) 1: Medication (see MAR)  Patient Stated Pain Goal: 0 Patient Stated Pain Goal: 0  o Intervention effective: yes    o Other actions taken for pain: no c/o     Skin Assessment  Skin color Skin Color: Appropriate for ethnicity  Condition/Temperature Skin Condition/Temp: Warm  Integrity Skin Integrity: Incision (comment)  Turgor Turgor: Non-tenting  Weekly Pressure Ulcer Documentation  Pressure  Injury Documentation: No Pressure Injury Noted-Pressure Ulcer Prevention Initiated  Wound Prevention & Protection Methods  Orientation of wound Orientation of Wound Prevention: Posterior  Location of Prevention Location of Wound Prevention: Sacrum/Coccyx  Dressing Present Dressing Present : No  Dressing Status Dressing Status: Intact  Wound Offloading Wound Offloading (Prevention Methods): Bed, pressure redistribution/air, Bed, pressure reduction mattress     INTAKE/OUPUT  Date 01/08/20 0700 - 01/09/20 0659 01/09/20 0700 - 01/10/20 0659   Shift 0700-1859 1900-0659 24 Hour Total 0700-1859 1900-0659 24 Hour Total   INTAKE   Shift Total(mL/kg)         OUTPUT   Urine(mL/kg/hr)           Urine Occurrence(s) 4 x 4 x 8 x      Stool           Stool Occurrence(s) 0 x 1 x 1 x      Shift Total(mL/kg)         NET         Weight (kg) 78.2 78.2 78.2 78.2 78.2 78.2       Recommendations:  1. Patient needs and requests: met    2. Diet: Cardiac DM Reg /thin liq    3. Pending tests/procedures: none     4. Functional Level/Equipment: wheelchair    5. Estimated Discharge Date: TBD Posted on Whiteboard in Patients Room: yes     HEALS Safety Check    A safety check occurred in the patient's room between off going nurse and oncoming nurse listed above.     The safety check included the below items  Area Items   H  High Alert Medications - Verify all high alert medication drips (heparin, PCA, etc.)   E  Equipment - Suction is set up for ALL patients (with yanker)  - Red plugs utilized for all equipment (IV pumps, etc.)  - WOWs wiped down at end of shift.  - Room stocked with oxygen, suction, and other unit-specific supplies   A  Alarms - Bed alarm is set for fall risk patients  - Ensure chair alarm is in place and activated if patient is up in a chair   L  Lines - Check IV for any infiltration  - Renae bag is empty if patient has a Rneae   - Tubing and IV bags are labeled   S  Safety   - Room is clean, patient is clean, and equipment is clean. - Hallways are clear from equipment besides carts. - Fall bracelet on for fall risk patients  - Ensure room is clear and free of clutter  - Suction is set up for ALL patients (with lashay)  - Hallways are clear from equipment besides carts.    - Isolation precautions followed, supplies available outside room, sign posted

## 2020-01-09 NOTE — PROGRESS NOTES
attended the interdisciplinary rounds for Juli Guerra, who is a 62 y.o.,female. Patient's Primary Language is: [place]. According to the patient's EMR Sabianist Affiliation is: [place].      The reason the Patient came to the hospital is:   Patient Active Problem List    Diagnosis Date Noted    Bipolar affective disorder ([address] 12/05/2012     Priority: 1 - One    Chronic respiratory failure with hypoxia ([address]     Gout     Menopause     Long term current use of aspirin     Opioid dependence ([address]     Tobacco use disorder     Acute blood loss as cause of postoperative anemia 12/12/2019    Impaired mobility and ADLs 12/11/2019    Spinal cord compression ([address] 12/11/2019    Compression fracture of body of thoracic vertebra (HCC) 12/11/2019    Status post laminectomy with spinal fusion 12/11/2019    History of fracture due to fall 12/11/2019    Hyperuricemia 05/26/2017    Mixed connective tissue disease ([address] 05/10/2017    History of vitamin D deficiency 05/10/2017    Urge urinary incontinence 05/10/2017    History of penicillin allergy     Falls frequently     Gastroesophageal reflux disease with hiatal hernia     Memory difficulty     Cellulitis of arm, right 05/04/2017    Olecranon bursitis of right elbow 05/04/2017    Contusion of left elbow 05/04/2017    Cellulitis of right forearm 05/04/2017    Intravenous drug user 05/02/2017    Right Achilles tendinitis 05/02/2017    Cellulitis and abscess of hand 04/13/2017    Cellulitis and abscess of foot 04/13/2017    Abnormal nuclear stress test 11/17/2016    Hypoxemia requiring supplemental oxygen 12/29/2014    Abscess of right arm 06/03/2013    History of acute renal failure 05/31/2013    Recurrent genital herpes 05/31/2013    Hypothyroidism     Sarcoidosis     Diastolic dysfunction without heart failure     Chronic obstructive pulmonary disease (COPD) ([address]     Hypertensive heart disease without heart failure     Depression     History of back injury     Type 2 diabetes mellitus with diabetic neuropathy, with long-term current use of insulin (HCC)     Anxiety     Wears glasses     History of hepatitis C     Sickle cell trait (HCC)     Abnormally low high density lipoprotein (HDL) cholesterol with hypertriglyceridemia     Generalized osteoarthritis of multiple sites     Diabetic neuropathy associated with type 2 diabetes mellitus (HCC)     Venous insufficiency     Obesity, Class I, BMI 30-34.9     Chronic back pain           Plan:  Chaplains will continue to follow and will provide pastoral care on an as needed/requested basis.  recommends bedside caregivers page  on duty if patient shows signs of acute spiritual or emotional distress.     [facility]   [phone]

## 2020-01-09 NOTE — PROGRESS NOTES
Problem: Diabetes Self-Management  Goal: *Disease process and treatment process  Description  Define diabetes and identify own type of diabetes; list 3 options for treating diabetes. Outcome: Progressing Towards Goal  Goal: *Incorporating nutritional management into lifestyle  Description  Describe effect of type, amount and timing of food on blood glucose; list 3 methods for planning meals. Outcome: Progressing Towards Goal  Goal: *Incorporating physical activity into lifestyle  Description  State effect of exercise on blood glucose levels. Outcome: Progressing Towards Goal  Goal: *Developing strategies to promote health/change behavior  Description  Define the ABC's of diabetes; identify appropriate screenings, schedule and personal plan for screenings. Outcome: Progressing Towards Goal  Goal: *Using medications safely  Description  State effect of diabetes medications on diabetes; name diabetes medication taking, action and side effects. Outcome: Progressing Towards Goal  Goal: *Monitoring blood glucose, interpreting and using results  Description  Identify recommended blood glucose targets  and personal targets. Outcome: Progressing Towards Goal  Goal: *Prevention, detection, treatment of acute complications  Description  List symptoms of hyper- and hypoglycemia; describe how to treat low blood sugar and actions for lowering  high blood glucose level. Outcome: Progressing Towards Goal  Goal: *Prevention, detection and treatment of chronic complications  Description  Define the natural course of diabetes and describe the relationship of blood glucose levels to long term complications of diabetes.   Outcome: Progressing Towards Goal  Goal: *Developing strategies to address psychosocial issues  Description  Describe feelings about living with diabetes; identify support needed and support network  Outcome: Progressing Towards Goal  Goal: *Insulin pump training  Outcome: Progressing Towards Goal  Goal: *Sick day guidelines  Outcome: Progressing Towards Goal  Goal: *Patient Specific Goal (EDIT GOAL, INSERT TEXT)  Outcome: Progressing Towards Goal     Problem: Patient Education: Go to Patient Education Activity  Goal: Patient/Family Education  Outcome: Progressing Towards Goal     Problem: Falls - Risk of  Goal: *Absence of Falls  Description  Document Tia Manus Fall Risk and appropriate interventions in the flowsheet. Outcome: Progressing Towards Goal  Note: Fall Risk Interventions:  Mobility Interventions: Assess mobility with egress test    Mentation Interventions: Adequate sleep, hydration, pain control, Bed/chair exit alarm    Medication Interventions: Bed/chair exit alarm, Patient to call before getting OOB    Elimination Interventions: Call light in reach, Bed/chair exit alarm, Patient to call for help with toileting needs    History of Falls Interventions: Bed/chair exit alarm         Problem: Patient Education: Go to Patient Education Activity  Goal: Patient/Family Education  Outcome: Progressing Towards Goal     Problem: Pressure Injury - Risk of  Goal: *Prevention of pressure injury  Description  Document Florian Scale and appropriate interventions in the flowsheet.   Outcome: Progressing Towards Goal  Note: Pressure Injury Interventions:  Sensory Interventions: Assess changes in LOC    Moisture Interventions: Absorbent underpads    Activity Interventions: Increase time out of bed, Pressure redistribution bed/mattress(bed type)    Mobility Interventions: HOB 30 degrees or less    Nutrition Interventions: Document food/fluid/supplement intake    Friction and Shear Interventions: HOB 30 degrees or less                Problem: Patient Education: Go to Patient Education Activity  Goal: Patient/Family Education  Outcome: Progressing Towards Goal     Problem: Infection - Risk of, Surgical Site Infection  Goal: *Absence of surgical site infection signs and symptoms  Outcome: Progressing Towards Goal     Problem: Patient Education: Go to Patient Education Activity  Goal: Patient/Family Education  Outcome: Progressing Towards Goal     Problem: Pain  Goal: *Control of Pain  Outcome: Progressing Towards Goal  Goal: *PALLIATIVE CARE:  Alleviation of Pain  Outcome: Progressing Towards Goal     Problem: Patient Education: Go to Patient Education Activity  Goal: Patient/Family Education  Outcome: Progressing Towards Goal     Problem: Injury - Risk of, Adverse Drug Event  Goal: *Absence of adverse drug events  Outcome: Progressing Towards Goal  Goal: *Absence of medication errors  Outcome: Progressing Towards Goal  Goal: *Knowledge of prescribed medications  Outcome: Progressing Towards Goal     Problem: Patient Education: Go to Patient Education Activity  Goal: Patient/Family Education  Outcome: Progressing Towards Goal     Problem: Inpatient Rehab (Adult)  Goal: *LTG: Avoids injury/falls 100% of time related to deficits  Outcome: Progressing Towards Goal  Goal: *LTG: Avoids infection 100% of time related to deficits  Outcome: Progressing Towards Goal  Goal: *LTG: Verbalize understanding of diagnosis and risk factors for recurring stroke  Outcome: Progressing Towards Goal  Goal: *LTG: Absence of DVT during hospitalization  Outcome: Progressing Towards Goal  Goal: *LTG: Maintains Skin Integrity With No Evidence of Pressure Injury 100% of Time  Outcome: Progressing Towards Goal  Goal: Interventions  Outcome: Progressing Towards Goal     Problem: Patient Education: Go to Patient Education Activity  Goal: Patient/Family Education  Outcome: Progressing Towards Goal

## 2020-01-10 NOTE — PROGRESS NOTES
SHIFT CHANGE NOTE FOR [facility]    Bedside and Verbal shift change report given to Nixon Ttae LPN (oncoming nurse) by Bhumi Rodriguez RN (offgoing nurse). Report included the following information SBAR, Kardex, MAR and Recent Results.     Situation:   Code Status: Full Code   Reason for Admission: Spinal Thoracic laminectomy T 6-T [address] Day: 24   Problem List:   Hospital Problems  Date Reviewed: 1/9/2020          Codes Class Noted POA    Chronic respiratory failure with hypoxia (HCC) (Chronic) ICD-10-CM: J96.11  ICD-9-CM: 518.83, 799.02  Unknown Yes    Overview Signed 12/16/2019 10:11 PM by Mingo Woods MD     On home oxygen inhalation at 3 LPM via NC             Opioid dependence ([facility] [address] (Chronic) ICD-10-CM: F11.20  ICD-9-CM: 304.00  Unknown Yes    Overview Signed 12/16/2019 10:54 PM by Mingo Woods MD     On Methadone             Acute blood loss as cause of postoperative anemia ICD-10-CM: D62  ICD-9-CM: 285.1  12/12/2019 Yes        Impaired mobility and ADLs ICD-10-CM: Z74.09  ICD-9-CM: 799.89  12/11/2019 Yes        Spinal cord compression ([facility] [address] ICD-10-CM: G95.20  ICD-9-CM: 336.9  12/11/2019 Yes        Compression fracture of body of thoracic vertebra (HCC) ICD-10-CM: S22.000A  ICD-9-CM: 805.2  12/11/2019 Yes        * (Principal) Status post laminectomy with spinal fusion ICD-10-CM: Z98.1  ICD-9-CM: V45.4  12/11/2019 Yes    Overview Signed 12/16/2019 10:05 PM by Mingo Woods MD     S/P T10, T9, T8 laminectomy; T7, T8, T9, T10, T11, T12 posterior thoracic fusion; segmental spinal instrumentation; K2M [place] type, T7-T8, T11-T12 (12/11/2019 - Dr. Shruti Blue)             History of fracture due to fall ICD-10-CM: Z87.81  ICD-9-CM: V15.51  12/11/2019 Yes        Hypoxemia requiring supplemental oxygen (Chronic) ICD-10-CM: R09.02, Z99.81  ICD-9-CM: 799.02  12/29/2014 Yes        Chronic obstructive pulmonary disease (COPD) (HCC) (Chronic) ICD-10-CM: J44.9  ICD-9-CM: 496  Unknown Yes    Overview Signed 4/23/2013 10:01 AM by Ambar Quezada     SOB, on paula O2  Recent admission with psychosis             Hypertensive heart disease without heart failure (Chronic) ICD-10-CM: I11.9  ICD-9-CM: 402.90  Unknown Yes    Overview Signed 4/23/2013 10:02 AM by Marlys CALLES     Better controlled             Type 2 diabetes mellitus with diabetic neuropathy, with long-term current use of insulin ([facility] [address] (Chronic) ICD-10-CM: E11.40, Z79.4  ICD-9-CM: 250.60, 357.2, V58.67  Unknown Yes    Overview Signed 12/18/2019  2:13 PM by Meaghan Farley MD     HbA1c (12/11/2019) = 7.1                   Background:   Past Medical History:   Past Medical History:   Diagnosis Date    Abnormally low high density lipoprotein (HDL) cholesterol with hypertriglyceridemia     Lipid profile (11/6/2016) showed , , HDL 38, LDL 37    Acute blood loss as cause of postoperative anemia 12/12/2019    Anxiety     Bipolar affective disorder ([facility] [address] 12/5/2012    Cellulitis of right forearm 05/04/2017    Chronic back pain     Chronic obstructive pulmonary disease (COPD) (HCC)     SOB, on paula O2 Recent admission with psychosis     Chronic respiratory failure with hypoxia (HCC)     On home oxygen inhalation at 3 LPM via NC    Contusion of left elbow 5/4/2017    Depression     Diabetic neuropathy associated with type 2 diabetes mellitus (HCC)     Diastolic dysfunction without heart failure     Stable on diuretics     Falls frequently     Gastroesophageal reflux disease with hiatal hernia     Generalized osteoarthritis of multiple sites     Gout     On Allopurinol    History of acute renal failure 5/31/2013    History of back injury     jumped out of second story window     History of fracture due to fall 12/11/2019    History of hepatitis C     treated    History of penicillin allergy     History of vitamin D deficiency 5/10/2017    Hypertensive heart disease without heart failure     Better controlled     Hyperuricemia 5/26/2017    Hypothyroidism     Hypoxemia requiring supplemental oxygen 12/29/2014    Intravenous drug user 5/2/2017    Long term current use of aspirin     Memory difficulty     Menopause     Mixed connective tissue disease ([facility] [address] 5/10/2017    Obesity, Class I, BMI 30-34.9     Olecranon bursitis of right elbow 5/4/2017    Opioid dependence ([facility]     On Methadone    Recurrent genital herpes 5/31/2013    Right Achilles tendinitis 5/2/2017    Sarcoidosis     Sickle cell trait ([facility]     Tobacco use disorder     Type 2 diabetes mellitus with diabetic neuropathy, with long-term current use of insulin ([facility])     HbA1c (12/11/2019) = 7.1    Urge urinary incontinence 5/10/2017    Venous insufficiency     Wears glasses       Patient taking anticoagulants no    Patient has a defibrillator: no     Assessment:   Changes in Assessment throughout shift: None     Patient has central line: no Reasons if yes: Removed 12/16/19  Insertion date:n/a Last dressing date:n/a   Patient has Renae Cath: no Reasons if yes: n/a   Insertion date:n/a     Last Vitals:     Vitals:    01/08/20 0725 01/08/20 2111 01/09/20 0804 01/09/20 1541   BP: 120/70 128/74 131/79 121/71   Pulse: 68 68 64 85   Resp: 18 18 18 18   Temp: 98 °F (36.7 °C) 97 °F (36.1 °C) 97.8 °F (36.6 °C) 97.8 °F (36.6 °C)   SpO2: 96% 96% 97% 96%   Weight:       LMP: 11/25/2012        PAIN    Pain Assessment    Pain Intensity 1: 7 (01/09/20 1542) Pain Intensity 1: 2 (12/29/14 1105)    Pain Location 1: Rectal Pain Location 1: Abdomen    Pain Intervention(s) 1: Medication (see MAR) Pain Intervention(s) 1: Medication (see MAR)  Patient Stated Pain Goal: 0 Patient Stated Pain Goal: 0  o Intervention effective: yes    o Other actions taken for pain: no c/o     Skin Assessment  Skin color Skin Color: Appropriate for ethnicity  Condition/Temperature Skin Condition/Temp: Warm  Integrity Skin Integrity: Incision (comment)  Turgor Turgor: Non-tenting  Weekly Pressure Ulcer Documentation  Pressure  Injury Documentation: No Pressure Injury Noted-Pressure Ulcer Prevention Initiated  Wound Prevention & Protection Methods  Orientation of wound Orientation of Wound Prevention: Posterior  Location of Prevention Location of Wound Prevention: Buttocks, Sacrum/Coccyx  Dressing Present Dressing Present : No  Dressing Status Dressing Status: Intact  Wound Offloading Wound Offloading (Prevention Methods): Adaptive equipment, Bed, pressure reduction mattress, Chair cushion, Pillows, Repositioning, Turning, Wheelchair     INTAKE/OUPUT  Date 01/08/20 1900 - 01/09/20 0659 01/09/20 0700 - 01/10/20 0659   Shift 0432-5591 24 Hour Total 4807-5868 8872-3803 24 Hour Total   INTAKE   Shift Total(mL/kg)        OUTPUT   Urine(mL/kg/hr)          Urine Occurrence(s) 4 x 8 x 2 x  2 x   Stool          Stool Occurrence(s) 1 x 1 x 2 x  2 x   Shift Total(mL/kg)        NET        Weight (kg) 78.2 78.2 78.2 78.2 78.2       Recommendations:  1. Patient needs and requests: met    2. Diet: Cardiac DM Reg /thin liq    3. Pending tests/procedures: none     4. Functional Level/Equipment: wheelchair    5. Estimated Discharge Date: TBD Posted on Whiteboard in Patients Room: yes     HEALS Safety Check    A safety check occurred in the patient's room between off going nurse and oncoming nurse listed above.     The safety check included the below items  Area Items   H  High Alert Medications - Verify all high alert medication drips (heparin, PCA, etc.)   E  Equipment - Suction is set up for ALL patients (with yanker)  - Red plugs utilized for all equipment (IV pumps, etc.)  - WOWs wiped down at end of shift.  - Room stocked with oxygen, suction, and other unit-specific supplies   A  Alarms - Bed alarm is set for fall risk patients  - Ensure chair alarm is in place and activated if patient is up in a chair   L  Lines - Check IV for any infiltration  - Renae bag is empty if patient has a Renae   - Tubing and IV bags are labeled   S  Safety   - Room is clean, patient is clean, and equipment is clean. - Hallways are clear from equipment besides carts. - Fall bracelet on for fall risk patients  - Ensure room is clear and free of clutter  - Suction is set up for ALL patients (with lashay)  - Hallways are clear from equipment besides carts.    - Isolation precautions followed, supplies available outside room, sign posted

## 2020-01-10 NOTE — PROGRESS NOTES
Problem: Mobility Impaired (Adult and Pediatric)  Goal: *Therapy Goal (Edit Goal, Insert Text)  Description  Physical Therapy Goals:  STG= LTG  Initiated 2019, revised 2020 and to be accomplished within 28 day(s) on 2020:   1. Patient will move from supine to sit and sit to supine with min A, and roll side to side in bed with contact guard assist.   2.  Patient will transfer from bed to chair and chair to bed with contact guard assist using lateral transfer board, equal heights; and min A with incline to higher surface (24\" height bed)  3. Patient will perform sit to stand with moderate assistance and least restrictive device. 4.  Patient will perform car transfer with lateral transfer board and min A.   5.  Patient will perform wheelchair mobility over even surfaces at mod independent level for at least 150 ft, including negotiation of doorways. 6.  Patient will perform wheelchair mobility up/down ramp, uneven sidewalk min A level. Outcome: Progressing Towards Goal   PHYSICAL THERAPY TREATMENT    Patient: Daniel Sinha (65 y.o. female)  Date: 1/10/2020  Diagnosis: Status post laminectomy with spinal fusion [Z98.1] Status post laminectomy with spinal fusion  Precautions: Fall, Spinal(thoracic)  Chart, physical therapy assessment, plan of care and goals were reviewed. Time in: 910  Time out: 12  Pt seen for: family training, txfr training, bed mobility  Time In: 1106  Time Out: 1203  Patient Seen For: Wheelchair mobility;Transfer training  Pain:  Pt pain was reported as  7.5/10 pre-treatment. Pt pain was reported as 7.5/10 post-treatment. Intervention:  already had pain meds    Patient identified with name and : yes     SUBJECTIVE:     Pt perseverated on rectum hurting and needing to \"defecate. \"    OBJECTIVE DATA SUMMARY:   Objective: Pt's caregiver present for family training.      BED/MAT MOBILITY Daily Assessment    Rolling Right : 3 (Moderate assistance )  Rolling Left : 3 (Moderate assistance )  Supine to Sit : 2 (Maximal assistance)  Sit to Supine : 2 (Maximal assistance)  Pt performed bed mobility in hospital bed but without bed rails. Pt's caregiver educated and performed return demo with mod assist for rolling side to side with logroll technique and maintaining spinal precautions. Pt required max assist with BLE for sit to left sidelying and left sidelying to sitting. Caregiver provided assistance with left side to sitting. TRANSFERS Daily Assessment    Transfer Type: Lateral with transfer board  Transfer Assistance : 3 (Moderate assistance )   Pt presents on [facility] with nursing when therapist arrived. Pt performed partial stand with max assist for second staff to provide total assist for management of brief and pants. Pt's caregiver performed slide board txfr [facility] to w/c to left side with S and v/c from therapist but demo'd proper technique that was reviewed and performed on Wednesday. Pt performed slide board txfr w/c to bed with mod assist by therapist to left side. Pt requires max v/c for anterior wt shift and to scoot laterally and not posterior push. During second session, pt performed slide board txfr w/c to 18\" mat table to right side with min assist to block BLE and max v/c for anterior wt shift and proper hand placement. Pt required mod assist to txfr back to w/c to left side. Pt then performed slide board txfr w/c<->BSC with mod assist. Pt required max assist for partial standing with pt pushing up with BUE for second person to manage brief and pants.         BALANCE Daily Assessment    Sitting - Static: Fair (occasional)  Sitting - Dynamic: Fair (occasional)       WHEELCHAIR MOBILITY Daily Assessment    Able to Propel (ft): 72 feet(with dist S and increased time)  Wheelchair Setup Assist Required : 3 (Moderate assistance)  Wheelchair Management: Manages left brake;Manages right brake   Pt propelled w/c from room to gym with dist S and increased time and multiple cues to stay on task. ASSESSMENT:  Pt's caregiver demo'd appropriate and safe slide board txfr and bed mobility. Progression toward goals:  []      Improving appropriately and progressing toward goals  [x]      Improving slowly and progressing toward goals  []      Not making progress toward goals and plan of care will be adjusted     PLAN:   Patient continues to benefit from skilled intervention to address the above impairments. Continue treatment per established plan of care. Discharge Recommendations:  Home Health  Further Equipment Recommendations for Discharge:  wheelchair 18 inch and slide board     Estimated LOS: 1/14/2020    Activity Tolerance:   fair  Please refer to the flowsheet for vital signs taken during this treatment. After treatment:   Patient left in w/c in room.        Debora Solares PTA  1/10/2020

## 2020-01-10 NOTE — PROGRESS NOTES
[address]  [address] [place] [address], [address]     INPATIENT REHABILITATION  DAILY PROGRESS NOTE     Date: 1/10/2020    Name: Judit Duggan Age / Sex: 62 y.o. / female   CSN: [id number] MRN: [id number]   [facility] Date: 12/16/2019 Length of Stay: 25 days     Primary Rehab Diagnosis: Impaired Mobility and ADLs secondary to:  1. S/P T10, T9, T8 laminectomy; T7, T8, T9, T10, T11, T12 posterior thoracic fusion; segmental spinal instrumentation; K2M [place] type, T7-T8, T11-T12 (12/11/2019 - Dr. Ryan Valenzuela)  2. History of acute compression fractures of body of thoracic vertebrae (T9 and T10) due to fall      Subjective:     Patient seen and examined. Blood pressure controlled. Blood glucose controlled. Patient's Complaint:   No significant medical complaints    Pain Control: stable, mild-to-moderate joint symptoms intermittently, reasonably well controlled by current meds      Objective:     Vital Signs:  Patient Vitals for the past 24 hrs:   BP Temp Pulse Resp SpO2   01/10/20 0721 124/70 97.9 °F (36.6 °C) 68 17 93 %   01/09/20 2203 129/75 99 °F (37.2 °C) 61 16 95 %   01/09/20 1541 121/71 97.8 °F (36.6 °C) 85 18 96 %        Physical Examination:  GENERAL SURVEY: Patient is awake, alert, oriented x 3, sitting comfortably on the chair, not in acute respiratory distress. HEENT: pale palpebral conjunctivae, anicteric sclerae, no nasoaural discharge, moist oral mucosa  NECK: supple, no jugular venous distention, no palpable lymph nodes  CHEST/LUNGS: symmetrical chest expansion, good air entry, clear breath sounds  HEART: adynamic precordium, good S1 S2, no S3, regular rhythm, no murmurs  ABDOMEN: obese, bowel sounds appreciated, soft, non-tender  EXTREMITIES: pale nailbeds, no edema, full and equal pulses, no calf tenderness   NEUROLOGICAL EXAM: The patient is awake, alert and oriented x3, able to answer questions fairly appropriately, able to follow 1 and 2 step commands.   Able to tell time from the wall clock. Cranial nerves II-XII are grossly intact. No gross sensory deficit. Motor strength is 4-/5 on BUE, 2+/5 on the RLE (except for 1/5 on the right ankle), 2-/5 on the LLE (except for 2+/5 on the left knee).      Incision(s): healing well, clean, dry, and intact and serosanguineous       Current Medications:  Current Facility-Administered Medications   Medication Dose Route Frequency    cholestyramine-aspartame (QUESTRAN LIGHT) packet 4 g  4 g Oral TID WITH MEALS    busPIRone (BUSPAR) tablet 5 mg  5 mg Oral TID    pantoprazole (PROTONIX) tablet 40 mg  40 mg Oral ACB    calcium carbonate (TUMS) chewable tablet 200 mg [elemental]  200 mg Oral TID PRN    insulin glargine (LANTUS) injection 25 Units  25 Units SubCUTAneous ACB    insulin lispro (HUMALOG) injection 4 Units  4 Units SubCUTAneous TIDAC    guaiFENesin ER (MUCINEX) tablet 600 mg  600 mg Oral Q12H    acetaminophen (TYLENOL) tablet 650 mg  650 mg Oral Q4H PRN    bisacodyl (DULCOLAX) tablet 10 mg  10 mg Oral Q48H PRN    ferrous sulfate tablet 325 mg  1 Tab Oral DAILY WITH BREAKFAST    ascorbic acid (vitamin C) (VITAMIN C) tablet 250 mg  250 mg Oral DAILY WITH BREAKFAST    insulin lispro (HUMALOG) injection   SubCUTAneous TIDAC    senna-docusate (PERICOLACE) 8.6-50 mg per tablet 2 Tab  2 Tab Oral PCD    methadone (DOLOPHINE) tablet 20 mg  20 mg Oral DAILY    oxyCODONE-acetaminophen (PERCOCET 10)  mg per tablet 1 Tab  1 Tab Oral Q4H PRN    predniSONE (DELTASONE) tablet 30 mg  30 mg Oral DAILY WITH BREAKFAST    rosuvastatin (CRESTOR) tablet 5 mg  5 mg Oral QHS    albuterol (PROVENTIL VENTOLIN) nebulizer solution 2.5 mg  2.5 mg Nebulization Q4H PRN    levothyroxine (SYNTHROID) tablet 75 mcg  75 mcg Oral 6am    cholecalciferol (VITAMIN D3) (400 Units /10 mcg) tablet 5 Tab  2,000 Units Oral DAILY    calcium citrate tablet 200 mg [elemental]  200 mg Oral BID    divalproex DR (DEPAKOTE) tablet 500 mg  500 mg Oral QHS    fluticasone-vilanterol (BREO ELLIPTA) 100mcg-25mcg/puff  1 Puff Inhalation DAILY    oxybutynin (DITROPAN) tablet 5 mg  5 mg Oral BID    allopurinol (ZYLOPRIM) tablet 100 mg  100 mg Oral DAILY    sertraline (ZOLOFT) tablet 50 mg  50 mg Oral DAILY    methocarbamol (ROBAXIN) tablet 500 mg  500 mg Oral Q8H    aspirin chewable tablet 81 mg  81 mg Oral DAILY WITH BREAKFAST    docusate sodium (COLACE) capsule 100 mg  100 mg Oral DAILY AFTER BREAKFAST       Allergies: Allergies   Allergen Reactions    Moxifloxacin Rash    Pcn [Penicillins] Swelling    Sulfa (Sulfonamide Antibiotics) Swelling       Lab/Data Review:  Recent Results (from the past 24 hour(s))   GLUCOSE, POC    Collection Time: 01/09/20  4:52 PM   Result Value Ref Range    Glucose (POC) 177 (H) 70 - 110 mg/dL   GLUCOSE, POC    Collection Time: 01/09/20  8:05 PM   Result Value Ref Range    Glucose (POC) 147 (H) 70 - 110 mg/dL   GLUCOSE, POC    Collection Time: 01/10/20  7:20 AM   Result Value Ref Range    Glucose (POC) 169 (H) 70 - 110 mg/dL   GLUCOSE, POC    Collection Time: 01/10/20 12:22 PM   Result Value Ref Range    Glucose (POC) 130 (H) 70 - 110 mg/dL       Estimated Glomerular Filtration Rate:  On admission, estimated GFR based on a Creatinine of 0.68 mg/dl:              Using CKD-EPI = 107.9 mL/min/1.73m2              Using MDRD = 112.4 mL/min/1.73m2  Most recent estimated GFR, based on a Creatinine of 0.84 mg/dl on 1/6/2020:   Using CKD-EPI = 85.7 mL/min/1.73m2   Using MDRD = 88.8 mL/min/1.73m2       Assessment:     Primary Rehabilitation Diagnosis  1. Impaired Mobility and ADLs  2. S/P T10, T9, T8 laminectomy; T7, T8, T9, T10, T11, T12 posterior thoracic fusion; segmental spinal instrumentation; K2M [place] type, T7-T8, T11-T12 (12/11/2019 - Dr. Lorena López)  3.  History of acute compression fractures of body of thoracic vertebrae (T9 and T10) due to fall     Comorbidities   Diabetic neuropathy associated with type 2 diabetes mellitus ([facility] [address] E11.40    Venous insufficiency I87.2    Obesity, Class I, BMI 30-34.9 E66.9    Chronic back pain M54.9, G89.29    Generalized osteoarthritis of multiple sites M15.9    Bipolar affective disorder  F31.9    Abnormally low high density lipoprotein (HDL) cholesterol with hypertriglyceridemia E78.6, S81.6    Diastolic dysfunction without heart failure I51.89    Chronic obstructive pulmonary disease (COPD)  J44.9    Hypertensive heart disease without heart failure I11.9    Depression F32.9    History of back injury Z87.828    Type 2 diabetes mellitus with diabetic neuropathy, with long-term current use of insulin  E11.40, Z79.4    Anxiety F41.9    Wears glasses Z97.3    History of hepatitis C Z86.19    Sickle cell trait D57.3    History of acute renal failure Z87.448    Hypothyroidism E03.9    Recurrent genital herpes A60.00    Sarcoidosis D86.9    Abscess of right arm L02.413    Hypoxemia requiring supplemental oxygen R09.02, Z99.81    Abnormal nuclear stress test R94.39    Cellulitis and abscess of hand L03.119, L02.519    Cellulitis and abscess of foot L03.119, L02.619    Cellulitis of arm, right L03. 113    Olecranon bursitis of right elbow M70.21    Contusion of left elbow S50. 02XA    Intravenous drug user F19.90    Right Achilles tendinitis M76.61    History of penicillin allergy Z88.0    Falls frequently R29.6    Gastroesophageal reflux disease with hiatal hernia K21.9, K44.9    Memory difficulty R41.3    Mixed connective tissue disease  M35.1    Hyperuricemia E79.0    History of vitamin D deficiency Z86.39    Urge urinary incontinence N39.41    Acute blood loss as cause of postoperative anemia D62    History of fracture due to fall Z87.81    Chronic respiratory failure with hypoxia  J96.11    Cellulitis of right forearm L03. 113    Gout M10.9    Menopause Z78.0    Long term current use of aspirin Z79.82    Opioid dependence  F11.20    Tobacco use disorder F17.200        Plan:     1. Justification for continued stay: Good progression towards established rehabilitation goals. 2. Medical Issues being followed closely:    [x]  Fall and safety precautions     [x]  Wound Care     [x]  Bowel and Bladder Function     [x]  Fluid Electrolyte and Nutrition Balance     [x]  Pain Control      3. Issues that 24 hour rehabilitation nursing is following:    [x]  Fall and safety precautions     [x]  Wound Care     [x]  Bowel and Bladder Function     [x]  Fluid Electrolyte and Nutrition Balance     [x]  Pain Control      [x]  Assistance with and education on in-room safety with transfers to and from the bed, wheelchair, toilet and shower. 4. Acute rehabilitation plan of care:    [x]  Continue current care and rehab. [x]  Physical Therapy           [x]  Occupational Therapy           []  Speech Therapy     []  Hold Rehab until further notice     5. Medications:    [x]  MAR Reviewed     [x]  Continue Present Medications     6. DVT Prophylaxis:      []  Lovenox     []  Unfractionated Heparin     []  Coumadin     []  NOAC     [x]  ROBERT Stockings     []  Sequential Compression Device     []  None     7. Orders:   > S/P T10, T9, T8 laminectomy; T7, T8, T9, T10, T11, T12 posterior thoracic fusion; segmental spinal instrumentation; K2M [place] type, T7-T8, T11-T12 (12/11/2019 - Dr. Hyacinth Medina);  History of acute compression fractures of body of thoracic vertebrae (T9 and T10) due to fall   > Thoracic spinal precautions   > Continue:    > Calcium citrate 200 mg PO BID    > Cholecalciferol 2,000 units PO once daily    > Acute Postoperative Blood Loss Anemia   > Anemia work-up (12/13/2019) showed serum iron 11, TIBC 215, iron % saturation 5, ferritin 146   > Hgb/Hct (12/17/2019, on admission) = 7.4/23.1   > Reticulocyte count (12/17/2019) = 2.5   > Hgb/Hct (12/23/2019) = 8.5/26.7    > Hgb/Hct (12/30/2019) = 9.8/31.4    > Hgb/Hct (1/4/2020) = 10.9/35.5   > Hgb/Hct (1/6/2020) = 10. 7/34.1    > Continue:    > FeSO4 325 mg PO once daily with breakfast     > Ascorbic Acid 250 mg PO once daily with breakfast (to enhance the absorption of the FeSO4)     > Benign hypertensive heart and kidney disease with stage 3 chronic kidney disease without congestive heart failure   > Prior to admission, the patient stated she was on Metolazone 2.5 mg PO q Mon-Wed-Fri   > Metolazone was not given during the patient's hospital stay at [facility]    > Upon admission to the ARU, held Metolazone     > Chronic obstructive pulmonary disease; Chronic respiratory failure with hypoxemia requiring supplemental oxygen (3 LPM)   > Continue:    > Fluticasone-Vilanterol 100-25 1 puff inhaled once daily    > O2 inhalation at 3 LPM via nasal cannula continuous    > Opioid dependence   > Continue Methadone 20 mg PO once daily    > Type 2 diabetes mellitus with stage 3 chronic kidney disease   > HbA1c (12/11/2019) = 7.1   > On 12/22/2019:    > Increased Insulin glargine from 20 units to 22 units PO once daily before breakfast    > Started Insulin lispro 3 units SC TID AC    > On 12/24/2019, increased Insulin lispro from 3 units to 4 units SC TID AC    > On 12/25/2019, increased Insulin glargine from 22 units to 25 units PO once daily before breakfast   > Continue:    > Insulin glargine 25 units PO once daily before breakfast    > Insulin lispro 4 units SC TID AC     > Insulin lispro sliding scale SC TID AC only    > Cough   > On 12/18/2019, started Guaifenesin  mg PO q 12 hr   > Continue Guaifenesin  mg PO q 12 hr    > Constipation   > On 12/17/2019, started:    > Docusate sodium 100 mg PO once daily after breakfast    > PeriColace 2 tabs PO once daily after dinner   > On 12/22/2019, started Lubiprostone 24 mcg PO once daily with breakfast   > On 1/9/2020, discontinued Lubiprostone 24 mcg PO once daily with breakfast   > Continue:    > Docusate sodium 100 mg PO once daily after breakfast    > PeriColace 2 tabs PO once daily after dinner    > Gastroesophageal reflux disease   > On 1/2/2019, Famotidine 20 mg PO BID was changed to Pantoprazole 40 mg PO once daily before breakfast   > Continue Pantoprazole 40 mg PO once daily before breakfast    > Anxiety   > On 1/1/2020, started Buspirone 5 mg PO TID   > Continue Buspirone 5 mg PO TID    > Analgesia   > Continue:    > Acetaminophen 650 mg PO q 4 hr PRN for pain level less than 5/10    > Methocarbamol 500 mg PO q 8 hr    > Percocet 10/325 1 tab PO q 4 hr PRN for pain level greater than 4/10     > Diet:   > Specifications: Cardiac, diabetic consistent carb 1800 kcal, no concentrated sweets, low purine   > Solids (consistency): Regular    > Liquids (consistency): Thin       8. Patient's progress in rehabilitation and medical issues discussed with the patient. All questions answered to the best of my ability. Care plan discussed with patient and nurse.       Signed:    Coco Navas MD    January 10, 2020

## 2020-01-10 NOTE — PROGRESS NOTES
SHIFT CHANGE NOTE FOR [facility]    Bedside and Verbal shift change report given to Roland Wolff RN (oncoming nurse) by Alda Holbrook LPN (offgoing nurse). Report included the following information SBAR, Kardex, MAR and Recent Results.     Situation:   Code Status: Full Code   Reason for Admission: Spinal Thoracic laminectomy T 6-T [address] Day: 24   Problem List:   Hospital Problems  Date Reviewed: 1/9/2020          Codes Class Noted POA    Chronic respiratory failure with hypoxia (HCC) (Chronic) ICD-10-CM: J96.11  ICD-9-CM: 518.83, 799.02  Unknown Yes    Overview Signed 12/16/2019 10:11 PM by Yuval Lawrence MD     On home oxygen inhalation at 3 LPM via NC             Opioid dependence ([facility] [address] (Chronic) ICD-10-CM: F11.20  ICD-9-CM: 304.00  Unknown Yes    Overview Signed 12/16/2019 10:54 PM by Yuval Lawrence MD     On Methadone             Acute blood loss as cause of postoperative anemia ICD-10-CM: D62  ICD-9-CM: 285.1  12/12/2019 Yes        Impaired mobility and ADLs ICD-10-CM: Z74.09  ICD-9-CM: 799.89  12/11/2019 Yes        Spinal cord compression ([facility] [address] ICD-10-CM: G95.20  ICD-9-CM: 336.9  12/11/2019 Yes        Compression fracture of body of thoracic vertebra (HCC) ICD-10-CM: S22.000A  ICD-9-CM: 805.2  12/11/2019 Yes        * (Principal) Status post laminectomy with spinal fusion ICD-10-CM: Z98.1  ICD-9-CM: V45.4  12/11/2019 Yes    Overview Signed 12/16/2019 10:05 PM by Yuval Lawrence MD     S/P T10, T9, T8 laminectomy; T7, T8, T9, T10, T11, T12 posterior thoracic fusion; segmental spinal instrumentation; K2M [place] type, T7-T8, T11-T12 (12/11/2019 - Dr. Frandy Das)             History of fracture due to fall ICD-10-CM: Z87.81  ICD-9-CM: V15.51  12/11/2019 Yes        Hypoxemia requiring supplemental oxygen (Chronic) ICD-10-CM: R09.02, Z99.81  ICD-9-CM: 799.02  12/29/2014 Yes        Chronic obstructive pulmonary disease (COPD) (HCC) (Chronic) ICD-10-CM: J44.9  ICD-9-CM: 496  Unknown Yes    Overview Signed 4/23/2013 10:01 AM by Tristin Rojo     SOB, on paula O2  Recent admission with psychosis             Hypertensive heart disease without heart failure (Chronic) ICD-10-CM: I11.9  ICD-9-CM: 402.90  Unknown Yes    Overview Signed 4/23/2013 10:02 AM by Criss CALLES     Better controlled             Type 2 diabetes mellitus with diabetic neuropathy, with long-term current use of insulin ([facility] [address] (Chronic) ICD-10-CM: E11.40, Z79.4  ICD-9-CM: 250.60, 357.2, V58.67  Unknown Yes    Overview Signed 12/18/2019  2:13 PM by Mary Lipscomb MD     HbA1c (12/11/2019) = 7.1                   Background:   Past Medical History:   Past Medical History:   Diagnosis Date    Abnormally low high density lipoprotein (HDL) cholesterol with hypertriglyceridemia     Lipid profile (11/6/2016) showed , , HDL 38, LDL 37    Acute blood loss as cause of postoperative anemia 12/12/2019    Anxiety     Bipolar affective disorder ([facility] [address] 12/5/2012    Cellulitis of right forearm 05/04/2017    Chronic back pain     Chronic obstructive pulmonary disease (COPD) (HCC)     SOB, on paula O2 Recent admission with psychosis     Chronic respiratory failure with hypoxia (HCC)     On home oxygen inhalation at 3 LPM via NC    Contusion of left elbow 5/4/2017    Depression     Diabetic neuropathy associated with type 2 diabetes mellitus (HCC)     Diastolic dysfunction without heart failure     Stable on diuretics     Falls frequently     Gastroesophageal reflux disease with hiatal hernia     Generalized osteoarthritis of multiple sites     Gout     On Allopurinol    History of acute renal failure 5/31/2013    History of back injury     jumped out of second story window     History of fracture due to fall 12/11/2019    History of hepatitis C     treated    History of penicillin allergy     History of vitamin D deficiency 5/10/2017    Hypertensive heart disease without heart failure     Better controlled     Hyperuricemia 5/26/2017    Hypothyroidism     Hypoxemia requiring supplemental oxygen 12/29/2014    Intravenous drug user 5/2/2017    Long term current use of aspirin     Memory difficulty     Menopause     Mixed connective tissue disease ([facility] [address] 5/10/2017    Obesity, Class I, BMI 30-34.9     Olecranon bursitis of right elbow 5/4/2017    Opioid dependence ([facility]     On Methadone    Recurrent genital herpes 5/31/2013    Right Achilles tendinitis 5/2/2017    Sarcoidosis     Sickle cell trait ([facility]     Tobacco use disorder     Type 2 diabetes mellitus with diabetic neuropathy, with long-term current use of insulin ([facility])     HbA1c (12/11/2019) = 7.1    Urge urinary incontinence 5/10/2017    Venous insufficiency     Wears glasses       Patient taking anticoagulants no    Patient has a defibrillator: no     Assessment:   Changes in Assessment throughout shift: None     Patient has central line: no Reasons if yes: Removed 12/16/19  Insertion date:n/a Last dressing date:n/a   Patient has Renae Cath: no Reasons if yes: n/a   Insertion date:n/a     Last Vitals:     Vitals:    01/08/20 2111 01/09/20 0804 01/09/20 1541 01/09/20 2203   BP: 128/74 131/79 121/71 129/75   Pulse: 68 64 85 61   Resp: 18 18 18 16   Temp: 97 °F (36.1 °C) 97.8 °F (36.6 °C) 97.8 °F (36.6 °C) 99 °F (37.2 °C)   SpO2: 96% 97% 96% 95%   Weight:       LMP: 11/25/2012        PAIN    Pain Assessment    Pain Intensity 1: 7 (01/09/20 1542) Pain Intensity 1: 2 (12/29/14 1105)    Pain Location 1: Rectal Pain Location 1: Abdomen    Pain Intervention(s) 1: Medication (see MAR) Pain Intervention(s) 1: Medication (see MAR)  Patient Stated Pain Goal: 0 Patient Stated Pain Goal: 0  o Intervention effective: yes    o Other actions taken for pain: no c/o     Skin Assessment  Skin color Skin Color: Appropriate for ethnicity  Condition/Temperature Skin Condition/Temp: Warm  Integrity Skin Integrity: Incision (comment)  Turgor Turgor: Non-tenting  Weekly Pressure Ulcer Documentation  Pressure  Injury Documentation: No Pressure Injury Noted-Pressure Ulcer Prevention Initiated  Wound Prevention & Protection Methods  Orientation of wound Orientation of Wound Prevention: Posterior  Location of Prevention Location of Wound Prevention: Buttocks, Sacrum/Coccyx  Dressing Present Dressing Present : No  Dressing Status Dressing Status: Intact  Wound Offloading Wound Offloading (Prevention Methods): Adaptive equipment, Bed, pressure reduction mattress, Chair cushion, Pillows, Repositioning, Turning, Wheelchair     INTAKE/OUPUT  Date 01/08/20 1900 - 01/09/20 0659 01/09/20 0700 - 01/10/20 0659   Shift 6126-3456 24 Hour Total 8319-3519 0715-6225 24 Hour Total   INTAKE   Shift Total(mL/kg)        OUTPUT   Urine(mL/kg/hr)          Urine Occurrence(s) 4 x 8 x 2 x  2 x   Stool          Stool Occurrence(s) 1 x 1 x 2 x  2 x   Shift Total(mL/kg)        NET        Weight (kg) 78.2 78.2 78.2 78.2 78.2       Recommendations:  1. Patient needs and requests: met    2. Diet: Cardiac DM Reg /thin liq    3. Pending tests/procedures: none     4. Functional Level/Equipment: wheelchair    5. Estimated Discharge Date: TBD Posted on Whiteboard in Patients Room: yes     HEALS Safety Check    A safety check occurred in the patient's room between off going nurse and oncoming nurse listed above.     The safety check included the below items  Area Items   H  High Alert Medications - Verify all high alert medication drips (heparin, PCA, etc.)   E  Equipment - Suction is set up for ALL patients (with yanker)  - Red plugs utilized for all equipment (IV pumps, etc.)  - WOWs wiped down at end of shift.  - Room stocked with oxygen, suction, and other unit-specific supplies   A  Alarms - Bed alarm is set for fall risk patients  - Ensure chair alarm is in place and activated if patient is up in a chair   L  Lines - Check IV for any infiltration  - Renae bag is empty if patient has a Renae   - Tubing and IV bags are labeled   S  Safety   - Room is clean, patient is clean, and equipment is clean. - Hallways are clear from equipment besides carts. - Fall bracelet on for fall risk patients  - Ensure room is clear and free of clutter  - Suction is set up for ALL patients (with lashay)  - Hallways are clear from equipment besides carts.    - Isolation precautions followed, supplies available outside room, sign posted

## 2020-01-10 NOTE — PROGRESS NOTES
Problem: Self Care Deficits Care Plan (Adult)  Goal: *Therapy Goal (Edit Goal, Insert Text)  Description  Occupational Therapy Goals   Long Term Goals  Initiated 19 and to be accomplished within 4 week(s)  to facilitate increased self care independence and strength for return to PLOF and decreased care giver burden:   2. Pt will perform grooming with Mod I.   3. Pt will perform UB bathing with SBA. 4. Pt will perform LB bathing with Tyree. 5. Pt will perform tub/shower transfer <> TTB with Min A.   6. Pt will perform UB dressing with Set-up. 7. Pt will perform LB dressing with Min A.  8. Pt will perform toileting task with Min A.  9. Pt will perform toilet transfer with Min A/CGA. Short Term Goals   Initiated 19 and to be accomplished within 7 day(s) (2020) to facilitate increased self care independence and strength for return to PLOF and decreased care giver burden:   1. Pt will perform self-feeding with Mod I.   2. Pt will perform grooming with set-up. ()  3. Pt will perform UB bathing with Mod A. ()  4. Pt will perform LB bathing with Mod A in LRE. ()  5. Pt will perform tub/shower transfer <> TTB with MaxA x 1.-   6. Pt will perform UB dressing with SBA. -   7. Pt will perform LB dressing with Mod A using AE as needed. ()  8. Pt will perform toileting task with Max A x 1. ()  9. Pt will perform toilet transfer with MaxA x 1.- 20 UG Mod assist         Outcome Measures:  (19) Dynamometer  strength: Right= 27.33# (norm=55. 1#) Left= 34.67# (norm=45.7#)  (19) 9-hole peg test: Right=45.46 seconds ; Left=41.13 seconds         Outcome: Progressing Towards Goal   OCCUPATIONAL THERAPY TREATMENT    Patient: Abhijeet Puente Roots   62 y.o.     Patient identified with name and : Yes    Date: 1/10/2020    First Tx Session  Time In: 0930  Time Out[de-identified] 1000    Diagnosis: Status post laminectomy with spinal fusion [Z98.1]   Precautions: Fall, Spinal(thoracic)  Chart, occupational therapy assessment, plan of care, and goals were reviewed. Pain:  Pt reports 7/10 pain or discomfort prior to treatment. Pt reports 7/10 pain or discomfort post treatment. Intervention Provided: medication reportedly provided prior to session; pt reports this pain is to her rectum       SUBJECTIVE:   Patient stated Ever Courtney did it for me in the bed.  (re; bathing this AM)     OBJECTIVE DATA SUMMARY:     Pt with aid in room and PT finishing education/functional transfers. Provided verbal education on safe bathing/dressing techniques to maintain spinal precautions. Pt's aid insisting she has helped people with this and has practice; seemingly cutting therapist off during education. Instruction provided on use of reacher and LH shoe horn for pt participation in LB dressing tasks. Aid present during transfer from EOB>w/c and providing increased verbal cues for pt, however this appeared to be distracting as pt mildly tended to therapist's cues during this transfer. Aid left session prior to end, approx 20 mins into session. MOBILITY/TRANSFERS Daily Assessment   During transfer EOB>w/c, pt's aid present for education. Pt's aid provided verbal feedback to pt, however this appeared to be distracting for pt   Functional transfer training:   EOB>w/c lateral transfer Moderate Assistance with maximum cues for anterior weight shift and assist for B foot placement     Pt then performed same transfer w/c>edge of mat lateral transfer Moderate assist with max cues for anterior weight shift and assist for B foot placement        ASSESSMENT:  Pt remains easily distracted and bossy throughout session, requesting various items that are unrelated to therapy. Pt also attempts to make phone calls mid-session despite education on purpose of therapy and focus required. She has slowly progressed with transfers, able to complete lift off occasionally during scooting in prep for transfer. Progression toward goals:  []          Improving appropriately and progressing toward goals  [x]          Improving slowly and progressing toward goals  []          Not making progress toward goals and plan of care will be adjusted     PLAN:  Patient continues to benefit from skilled intervention to address the above impairments. Continue treatment per established plan of care. Discharge Recommendations:  Home Health with 24/7 Assistance   Further Equipment Recommendations for Discharge:  3:1 drop arm commode     Activity Tolerance:  Fair; moderate rest breaks     Estimated LOS: 1/14/2020    Please refer to the flowsheet for vital signs taken during this treatment. After treatment:   [x]  Patient left in no apparent distress sitting up edge of mat for OTR session   []  Patient left in no apparent distress in bed  []  Call bell left within reach  []  Nursing notified  []  Caregiver present  []  Bed alarm activated    COMMUNICATION/EDUCATION:   [x] Home safety education was provided and the patient/caregiver indicated understanding. [x] Patient/family have participated as able in goal setting and plan of care. [x] Patient/family agree to work toward stated goals and plan of care. [] Patient understands intent and goals of therapy, but is neutral about his/her participation. [] Patient is unable to participate in goal setting and plan of care.       SABINE Reyes/JANNET

## 2020-01-10 NOTE — PROGRESS NOTES
Problem: Self Care Deficits Care Plan (Adult)  Goal: *Therapy Goal (Edit Goal, Insert Text)  Description  Occupational Therapy Goals   Long Term Goals  Initiated 19 and to be accomplished within 4 week(s)  to facilitate increased self care independence and strength for return to PLOF and decreased care giver burden:   2. Pt will perform grooming with Mod I.   3. Pt will perform UB bathing with SBA. 4. Pt will perform LB bathing with Tyree. 5. Pt will perform tub/shower transfer <> TTB with Min A.   6. Pt will perform UB dressing with Set-up. 7. Pt will perform LB dressing with Min A.  8. Pt will perform toileting task with Min A.  9. Pt will perform toilet transfer with Min A/CGA. Short Term Goals   Initiated 19 and to be accomplished within 7 day(s) (2020) to facilitate increased self care independence and strength for return to PLOF and decreased care giver burden:   1. Pt will perform self-feeding with Mod I.   2. Pt will perform grooming with set-up. ()  3. Pt will perform UB bathing with Mod A. ()  4. Pt will perform LB bathing with Mod A in LRE. ()  5. Pt will perform tub/shower transfer <> TTB with MaxA x 1.-   6. Pt will perform UB dressing with SBA. -   7. Pt will perform LB dressing with Mod A using AE as needed. ()  8. Pt will perform toileting task with Max A x 1. ()  9. Pt will perform toilet transfer with MaxA x 1.- 20 UG Mod assist         Outcome Measures:  (19) Dynamometer  strength: Right= 27.33# (norm=55. 1#) Left= 34.67# (norm=45.7#)  (19) 9-hole peg test: Right=45.46 seconds ; Left=41.13 seconds         Outcome: Progressing Towards Goal  Goal: *Therapy Goal (Edit Goal, Insert Text)  Outcome: Progressing Towards Goal  Goal: Interventions  Outcome: Progressing Towards Goal  Second session:  Not Progressing Towards Goal   OCCUPATIONAL THERAPY TREATMENT    Patient: Chapin Chang Roots   62 y.o.     Patient identified with name and : Yes    Date: 1/10/2020    First Tx Session  Time In: 1000  Time Out[de-identified] 2547    Second Tx Session  Time In: 1338  Time Out[de-identified] 1061    Diagnosis: Status post laminectomy with spinal fusion [Z98.1]   Precautions: Fall, Spinal(thoracic)  Chart, occupational therapy assessment, plan of care, and goals were reviewed. Pain:  Pt reports 0/10 pain or discomfort prior to treatment. Pt reports pain or discomfort with exercises for sh flex with 1# wrist weights. Intervention Provided: Modified activity      SUBJECTIVE:   Patient stated First session: Deannie Box we play Miguelito Cinnamon.   Second session:  \"I had an accident. \"    OBJECTIVE DATA SUMMARY:   Patient's friend visiting during session today. Patient transitioned from dining room to her room d/t patient having accident (urinary incontinence). Patient friend accompanied patient and therapist in room. Patient requesting friend remain in room and assist with ADL. Therapist assisted patient and verbalized to friend that therapist was able to assist and patient would benefit from participating with therapist.  Patient requested new compression stockings due to the ones she was wearing being \"wet. \"  Therapist left room to get new compression socks while patient was positioned in supine on bed and friend remained in room. When therapist returned, friend was washing patient and reported to therapist, \"I should not have to be doing this. \"  Therapist replied that therapist was happy to complete activity and had just left room to get socks for patient. Therapist took over assisting patient while friend critiqued therapist skills. Once patient was cleaned and LB clothing was on, therapist assisted patient from supine to sitting and patient was noted to require MOD A for compliance with spinal precautions during activity. Therapist moved to retrieve sliding board and gait belt for transfer. Friend stepped over to patient and stated, \"we don't need all that, I can do it. \"  Therapist stated that therapist would assist patient using appropriate equipment and friend began transfer without equipment and completed transfer from bed to w/c. THERAPEUTIC ACTIVITY Daily Assessment   Sitting balance w/o support and UB strengthening (Mohsen game) While seated on mat w/o support, patient participated in card game Lorra Allegra) with 1# wghts on B wrists. Patient demonstrated adequate balance throughout activity and good coordination for shuffling and dealing cards today. THERAPEUTIC EXERCISE Daily Assessment   UB strengthening Participated in functional reaching exercises forward with BUEs and above head w/ 1# wghts at wrists intermittently throughout activity. Required cuing for technique and reaching above discontinued d/t c/o pain and pressure in back with activity. LOWER BODY BATHING Daily Assessment     Dependent from bed level following episode of urinary incontinence f/b bowel incontinence in bed. TOILETING Daily Assessment     Dependent today     UPPER BODY DRESSING Daily Assessment     MIN-MOD A for doffing pull over shirt. MIN A for donning. LOWER BODY DRESSING Daily Assessment     Dependent this date from bed level. MOBILITY/TRANSFERS Daily Assessment     Sliding board with MIN-MOD A from mat table to w/c today. MIN A from w/c to bed with sliding board. See above for bed to w/c.       ASSESSMENT:  Patient was eating sandwich cookies throughout session today. Patient demonstrated learned helplessness and decreased motivation throughout second session today. Patient visitor demonstrated safety issues related to patient care and this should be monitored during visits to reduce risks of falls or injuries to patient or visitor.   Progression toward goals:  []          Improving appropriately and progressing toward goals  [x]          Improving slowly and progressing toward goals  []          Not making progress toward goals and plan of care will be adjusted     PLAN:  Patient continues to benefit from skilled intervention to address the above impairments. Continue treatment per established plan of care. Discharge Recommendations:  Home Health with 24 hour assistance  Further Equipment Recommendations for Discharge:  3:1 drop arm commode      Activity Tolerance:  Decreased throughout both sessions    Estimated LOS: 1/14/20    Please refer to the flowsheet for vital signs taken during this treatment. After treatment:   [x]  Patient left in no apparent distress sitting up in chair   []  Patient left in no apparent distress in bed  []  Call bell left within reach  []  Nursing notified  []  Caregiver present  [x]  Bed alarm activated    COMMUNICATION/EDUCATION:   [x] Home safety education was provided and the patient/caregiver indicated understanding. [x] Patient/family have participated as able in goal setting and plan of care. [] Patient/family agree to work toward stated goals and plan of care. [] Patient understands intent and goals of therapy, but is neutral about his/her participation. [] Patient is unable to participate in goal setting and plan of care.       Alejandra Richards OTR/L

## 2020-01-10 NOTE — PROGRESS NOTES
Problem: Diabetes Self-Management  Goal: *Disease process and treatment process  Description  Define diabetes and identify own type of diabetes; list 3 options for treating diabetes. Outcome: Progressing Towards Goal  Goal: *Incorporating nutritional management into lifestyle  Description  Describe effect of type, amount and timing of food on blood glucose; list 3 methods for planning meals. Outcome: Progressing Towards Goal  Goal: *Incorporating physical activity into lifestyle  Description  State effect of exercise on blood glucose levels. Outcome: Progressing Towards Goal  Goal: *Developing strategies to promote health/change behavior  Description  Define the ABC's of diabetes; identify appropriate screenings, schedule and personal plan for screenings. Outcome: Progressing Towards Goal  Goal: *Using medications safely  Description  State effect of diabetes medications on diabetes; name diabetes medication taking, action and side effects. Outcome: Progressing Towards Goal  Goal: *Monitoring blood glucose, interpreting and using results  Description  Identify recommended blood glucose targets  and personal targets. Outcome: Progressing Towards Goal  Goal: *Prevention, detection, treatment of acute complications  Description  List symptoms of hyper- and hypoglycemia; describe how to treat low blood sugar and actions for lowering  high blood glucose level. Outcome: Progressing Towards Goal  Goal: *Prevention, detection and treatment of chronic complications  Description  Define the natural course of diabetes and describe the relationship of blood glucose levels to long term complications of diabetes.   Outcome: Progressing Towards Goal  Goal: *Developing strategies to address psychosocial issues  Description  Describe feelings about living with diabetes; identify support needed and support network  Outcome: Progressing Towards Goal  Goal: *Insulin pump training  Outcome: Progressing Towards Goal  Goal: *Sick day guidelines  Outcome: Progressing Towards Goal  Goal: *Patient Specific Goal (EDIT GOAL, INSERT TEXT)  Outcome: Progressing Towards Goal     Problem: Patient Education: Go to Patient Education Activity  Goal: Patient/Family Education  Outcome: Progressing Towards Goal     Problem: Falls - Risk of  Goal: *Absence of Falls  Description  Document Durcynthia Nicholas Fall Risk and appropriate interventions in the flowsheet. Outcome: Progressing Towards Goal  Note: Fall Risk Interventions:  Mobility Interventions: Bed/chair exit alarm, Patient to call before getting OOB    Mentation Interventions: Adequate sleep, hydration, pain control, Bed/chair exit alarm    Medication Interventions: Patient to call before getting OOB    Elimination Interventions: Bed/chair exit alarm, Call light in reach, Patient to call for help with toileting needs    History of Falls Interventions: Bed/chair exit alarm         Problem: Patient Education: Go to Patient Education Activity  Goal: Patient/Family Education  Outcome: Progressing Towards Goal     Problem: Pressure Injury - Risk of  Goal: *Prevention of pressure injury  Description  Document Florian Scale and appropriate interventions in the flowsheet.   Outcome: Progressing Towards Goal  Note: Pressure Injury Interventions:  Sensory Interventions: Assess changes in LOC    Moisture Interventions: Absorbent underpads    Activity Interventions: Increase time out of bed, Pressure redistribution bed/mattress(bed type)    Mobility Interventions: Pressure redistribution bed/mattress (bed type)    Nutrition Interventions: Document food/fluid/supplement intake    Friction and Shear Interventions: HOB 30 degrees or less                Problem: Patient Education: Go to Patient Education Activity  Goal: Patient/Family Education  Outcome: Progressing Towards Goal     Problem: Infection - Risk of, Surgical Site Infection  Goal: *Absence of surgical site infection signs and symptoms  Outcome: Progressing Towards Goal     Problem: Patient Education: Go to Patient Education Activity  Goal: Patient/Family Education  Outcome: Progressing Towards Goal     Problem: Pain  Goal: *Control of Pain  Outcome: Progressing Towards Goal  Goal: *PALLIATIVE CARE:  Alleviation of Pain  Outcome: Progressing Towards Goal     Problem: Patient Education: Go to Patient Education Activity  Goal: Patient/Family Education  Outcome: Progressing Towards Goal     Problem: Injury - Risk of, Adverse Drug Event  Goal: *Absence of adverse drug events  Outcome: Progressing Towards Goal  Goal: *Absence of medication errors  Outcome: Progressing Towards Goal  Goal: *Knowledge of prescribed medications  Outcome: Progressing Towards Goal     Problem: Patient Education: Go to Patient Education Activity  Goal: Patient/Family Education  Outcome: Progressing Towards Goal     Problem: Inpatient Rehab (Adult)  Goal: *LTG: Avoids injury/falls 100% of time related to deficits  Outcome: Progressing Towards Goal  Goal: *LTG: Avoids infection 100% of time related to deficits  Outcome: Progressing Towards Goal  Goal: *LTG: Verbalize understanding of diagnosis and risk factors for recurring stroke  Outcome: Progressing Towards Goal  Goal: *LTG: Absence of DVT during hospitalization  Outcome: Progressing Towards Goal  Goal: *LTG: Maintains Skin Integrity With No Evidence of Pressure Injury 100% of Time  Outcome: Progressing Towards Goal  Goal: Interventions  Outcome: Progressing Towards Goal     Problem: Patient Education: Go to Patient Education Activity  Goal: Patient/Family Education  Outcome: Progressing Towards Goal

## 2020-01-11 NOTE — PROGRESS NOTES
SHIFT CHANGE NOTE FOR [facility]    Bedside and Verbal shift change report given to Reggie Toribio RN (oncoming nurse) by Misbah Sutherland RN (offgoing nurse). Report included the following information SBAR, Kardex, MAR and Recent Results.     Situation:   Code Status: Full Code   Reason for Admission: Spinal Thoracic laminectomy T 6-T [address] Day: 26   Problem List:   Hospital Problems  Date Reviewed: 1/10/2020          Codes Class Noted POA    Chronic respiratory failure with hypoxia (HCC) (Chronic) ICD-10-CM: J96.11  ICD-9-CM: 518.83, 799.02  Unknown Yes    Overview Signed 12/16/2019 10:11 PM by Dee Pang MD     On home oxygen inhalation at 3 LPM via NC             Opioid dependence ([address] (Chronic) ICD-10-CM: F11.20  ICD-9-CM: 304.00  Unknown Yes    Overview Signed 12/16/2019 10:54 PM by Dee Pang MD     On Methadone             Acute blood loss as cause of postoperative anemia ICD-10-CM: D62  ICD-9-CM: 285.1  12/12/2019 Yes        Impaired mobility and ADLs ICD-10-CM: Z74.09  ICD-9-CM: 799.89  12/11/2019 Yes        Spinal cord compression ([facility] [address] ICD-10-CM: G95.20  ICD-9-CM: 336.9  12/11/2019 Yes        Compression fracture of body of thoracic vertebra (HCC) ICD-10-CM: S22.000A  ICD-9-CM: 805.2  12/11/2019 Yes        * (Principal) Status post laminectomy with spinal fusion ICD-10-CM: Z98.1  ICD-9-CM: V45.4  12/11/2019 Yes    Overview Signed 12/16/2019 10:05 PM by Dee Pang MD     S/P T10, T9, T8 laminectomy; T7, T8, T9, T10, T11, T12 posterior thoracic fusion; segmental spinal instrumentation; K2M [place] type, T7-T8, T11-T12 (12/11/2019 - Dr. Rigoberto Morales)             History of fracture due to fall ICD-10-CM: Z87.81  ICD-9-CM: V15.51  12/11/2019 Yes        Hypoxemia requiring supplemental oxygen (Chronic) ICD-10-CM: R09.02, Z99.81  ICD-9-CM: 799.02  12/29/2014 Yes        Chronic obstructive pulmonary disease (COPD) (HCC) (Chronic) ICD-10-CM: J44.9  ICD-9-CM: 496  Unknown Yes Overview Signed 4/23/2013 10:01 AM by Ashlee Bertin     SOB, on [place]ula O2  Recent admission with psychosis             Hypertensive heart disease without heart failure (Chronic) ICD-10-CM: I11.9  ICD-9-CM: 402.90  Unknown Yes    Overview Signed 4/23/2013 10:02 AM by Marjorie CALLES     Better controlled             Type 2 diabetes mellitus with diabetic neuropathy, with long-term current use of insulin ([facility] (Chronic) ICD-10-CM: E11.40, Z79.4  ICD-9-CM: 250.60, 357.2, V58.67  Unknown Yes    Overview Signed 12/18/2019  2:13 PM by Redd Marx MD     HbA1c (12/11/2019) = 7.1                   Background:   Past Medical History:   Past Medical History:   Diagnosis Date    Abnormally low high density lipoprotein (HDL) cholesterol with hypertriglyceridemia     Lipid profile (11/6/2016) showed , , HDL 38, LDL 37    Acute blood loss as cause of postoperative anemia 12/12/2019    Anxiety     Bipolar affective disorder ([facility] 12/5/2012    Cellulitis of right forearm 05/04/2017    Chronic back pain     Chronic obstructive pulmonary disease (COPD) (HCC)     SOB, on [place] O2 Recent admission with psychosis     Chronic respiratory failure with hypoxia (HCC)     On home oxygen inhalation at 3 LPM via NC    Contusion of left elbow 5/4/2017    Depression     Diabetic neuropathy associated with type 2 diabetes mellitus (HCC)     Diastolic dysfunction without heart failure     Stable on diuretics     Falls frequently     Gastroesophageal reflux disease with hiatal hernia     Generalized osteoarthritis of multiple sites     Gout     On Allopurinol    History of acute renal failure 5/31/2013    History of back injury     jumped out of second story window     History of fracture due to fall 12/11/2019    History of hepatitis C     treated    History of penicillin allergy     History of vitamin D deficiency 5/10/2017    Hypertensive heart disease without heart failure     Better controlled     Hyperuricemia 5/26/2017    Hypothyroidism     Hypoxemia requiring supplemental oxygen 12/29/2014    Intravenous drug user 5/2/2017    Long term current use of aspirin     Memory difficulty     Menopause     Mixed connective tissue disease ([facility] 5/10/2017    Obesity, Class I, BMI 30-34.9     Olecranon bursitis of right elbow 5/4/2017    Opioid dependence ([facility]     On Methadone    Recurrent genital herpes 5/31/2013    Right Achilles tendinitis 5/2/2017    Sarcoidosis     Sickle cell trait ([facility]     Tobacco use disorder     Type 2 diabetes mellitus with diabetic neuropathy, with long-term current use of insulin ([facility])     HbA1c (12/11/2019) = 7.1    Urge urinary incontinence 5/10/2017    Venous insufficiency     Wears glasses       Patient taking anticoagulants no    Patient has a defibrillator: no     Assessment:   Changes in Assessment throughout shift: None     Patient has central line: no Reasons if yes: Removed 12/16/19  Insertion date:n/a Last dressing date:n/a   Patient has Renae Cath: no Reasons if yes: n/a   Insertion date:n/a     Last Vitals:     Vitals:    01/09/20 2203 01/10/20 0721 01/10/20 1553 01/10/20 2206   BP: 129/75 124/70 113/64 113/62   Pulse: 61 68 68 94   Resp: 16 17 18 18   Temp: 99 °F (37.2 °C) 97.9 °F (36.6 °C) 97.5 °F (36.4 °C) 98.6 °F (37 °C)   SpO2: 95% 93% 94% 94%   Weight:       LMP: 11/25/2012        PAIN    Pain Assessment    Pain Intensity 1: 6 (01/11/20 0313) Pain Intensity 1: 2 (12/29/14 1105)    Pain Location 1: Rectal Pain Location 1: Abdomen    Pain Intervention(s) 1: Medication (see MAR) Pain Intervention(s) 1: Medication (see MAR)  Patient Stated Pain Goal: 0 Patient Stated Pain Goal: 0  o Intervention effective: yes    o Other actions taken for pain: no c/o     Skin Assessment  Skin color Skin Color: Appropriate for ethnicity  Condition/Temperature Skin Condition/Temp: Dry, Warm  Integrity Skin Integrity: Incision (comment)  Turgor Turgor: Non-tenting  Weekly Pressure Ulcer Documentation  Pressure  Injury Documentation: No Pressure Injury Noted-Pressure Ulcer Prevention Initiated  Wound Prevention & Protection Methods  Orientation of wound Orientation of Wound Prevention: Posterior  Location of Prevention Location of Wound Prevention: Buttocks, Sacrum/Coccyx  Dressing Present Dressing Present : No  Dressing Status Dressing Status: Intact  Wound Offloading Wound Offloading (Prevention Methods): Adaptive equipment     INTAKE/OUPUT  Date 01/10/20 0700 - 01/11/20 0659 01/11/20 0700 - 01/12/20 0659   Shift 0700-1859 1900-0659 24 Hour Total 0700-1859 1900-0659 24 Hour Total   INTAKE   P.O. 360  360        P. O. 360  360      Shift Total(mL/kg) 360(4.6)  360(4.6)      OUTPUT   Urine(mL/kg/hr)           Urine Occurrence(s) 1 x 1 x 2 x      Stool           Stool Occurrence(s) 1 x 0 x 1 x      Shift Total(mL/kg)           360      Weight (kg) 78.2 78.2 78.2 78.2 78.2 78.2       Recommendations:  1. Patient needs and requests: met    2. Diet: Cardiac DM Reg /thin liq    3. Pending tests/procedures: none     4. Functional Level/Equipment: wheelchair    5. Estimated Discharge Date: TBD Posted on Whiteboard in Patients Room: yes     HEALS Safety Check    A safety check occurred in the patient's room between off going nurse and oncoming nurse listed above.     The safety check included the below items  Area Items   H  High Alert Medications - Verify all high alert medication drips (heparin, PCA, etc.)   E  Equipment - Suction is set up for ALL patients (with yanker)  - Red plugs utilized for all equipment (IV pumps, etc.)  - WOWs wiped down at end of shift.  - Room stocked with oxygen, suction, and other unit-specific supplies   A  Alarms - Bed alarm is set for fall risk patients  - Ensure chair alarm is in place and activated if patient is up in a chair   L  Lines - Check IV for any infiltration  - Renae bag is empty if patient has a Renae   - Tubing and IV bags are labeled   S  Safety   - Room is clean, patient is clean, and equipment is clean. - Hallways are clear from equipment besides carts. - Fall bracelet on for fall risk patients  - Ensure room is clear and free of clutter  - Suction is set up for ALL patients (with lashay)  - Hallways are clear from equipment besides carts.    - Isolation precautions followed, supplies available outside room, sign posted

## 2020-01-11 NOTE — PROGRESS NOTES
SHIFT CHANGE NOTE FOR [facility]    Bedside and Verbal shift change report given to Raciel Arora RN (oncoming nurse) by Albert Salgado RN (offgoing nurse). Report included the following information SBAR, Kardex, MAR and Recent Results.     Situation:   Code Status: Full Code   Reason for Admission: Spinal Thoracic laminectomy T 6-T [address] Day: 25   Problem List:   Hospital Problems  Date Reviewed: 1/10/2020          Codes Class Noted POA    Chronic respiratory failure with hypoxia (HCC) (Chronic) ICD-10-CM: J96.11  ICD-9-CM: 518.83, 799.02  Unknown Yes    Overview Signed 12/16/2019 10:11 PM by Jamey Griffin MD     On home oxygen inhalation at 3 LPM via NC             Opioid dependence ([facility] (Chronic) ICD-10-CM: F11.20  ICD-9-CM: 304.00  Unknown Yes    Overview Signed 12/16/2019 10:54 PM by Jamey Griffin MD     On Methadone             Acute blood loss as cause of postoperative anemia ICD-10-CM: D62  ICD-9-CM: 285.1  12/12/2019 Yes        Impaired mobility and ADLs ICD-10-CM: Z74.09  ICD-9-CM: 799.89  12/11/2019 Yes        Spinal cord compression ([facility] [address] ICD-10-CM: G95.20  ICD-9-CM: 336.9  12/11/2019 Yes        Compression fracture of body of thoracic vertebra (HCC) ICD-10-CM: S22.000A  ICD-9-CM: 805.2  12/11/2019 Yes        * (Principal) Status post laminectomy with spinal fusion ICD-10-CM: Z98.1  ICD-9-CM: V45.4  12/11/2019 Yes    Overview Signed 12/16/2019 10:05 PM by Jamey Griffin MD     S/P T10, T9, T8 laminectomy; T7, T8, T9, T10, T11, T12 posterior thoracic fusion; segmental spinal instrumentation; K2M [place] type, T7-T8, T11-T12 (12/11/2019 - Dr. Lashaun Horan)             History of fracture due to fall ICD-10-CM: Z87.81  ICD-9-CM: V15.51  12/11/2019 Yes        Hypoxemia requiring supplemental oxygen (Chronic) ICD-10-CM: R09.02, Z99.81  ICD-9-CM: 799.02  12/29/2014 Yes        Chronic obstructive pulmonary disease (COPD) (HCC) (Chronic) ICD-10-CM: J44.9  ICD-9-CM: 496  Unknown Yes Overview Signed 4/23/2013 10:01 AM by Tala Escobar     SOB, on [place] O2  Recent admission with psychosis             Hypertensive heart disease without heart failure (Chronic) ICD-10-CM: I11.9  ICD-9-CM: 402.90  Unknown Yes    Overview Signed 4/23/2013 10:02 AM by Sindi CALLES     Better controlled             Type 2 diabetes mellitus with diabetic neuropathy, with long-term current use of insulin ([facility] (Chronic) ICD-10-CM: E11.40, Z79.4  ICD-9-CM: 250.60, 357.2, V58.67  Unknown Yes    Overview Signed 12/18/2019  2:13 PM by Yuval Lawrence MD     HbA1c (12/11/2019) = 7.1                   Background:   Past Medical History:   Past Medical History:   Diagnosis Date    Abnormally low high density lipoprotein (HDL) cholesterol with hypertriglyceridemia     Lipid profile (11/6/2016) showed , , HDL 38, LDL 37    Acute blood loss as cause of postoperative anemia 12/12/2019    Anxiety     Bipolar affective disorder ([facility] 12/5/2012    Cellulitis of right forearm 05/04/2017    Chronic back pain     Chronic obstructive pulmonary disease (COPD) (HCC)     SOB, on [place] O[place] Recent admission with psychosis     Chronic respiratory failure with hypoxia (HCC)     On home oxygen inhalation at 3 LPM via [place]    Contusion of left elbow 5/4/2017    Depression     Diabetic neuropathy associated with type 2 diabetes mellitus (HCC)     Diastolic dysfunction without heart failure     Stable on diuretics     Falls frequently     Gastroesophageal reflux disease with hiatal hernia     Generalized osteoarthritis of multiple sites     Gout     On Allopurinol    History of acute renal failure 5/31/2013    History of back injury     jumped out of second story window     History of fracture due to fall 12/11/2019    History of hepatitis C     treated    History of penicillin allergy     History of vitamin D deficiency 5/10/2017    Hypertensive heart disease without heart failure     Better controlled     Hyperuricemia 5/26/2017    Hypothyroidism     Hypoxemia requiring supplemental oxygen 12/29/2014    Intravenous drug user 5/2/2017    Long term current use of aspirin     Memory difficulty     Menopause     Mixed connective tissue disease ([facility] 5/10/2017    Obesity, Class I, BMI 30-34.9     Olecranon bursitis of right elbow 5/4/2017    Opioid dependence ([facility]     On Methadone    Recurrent genital herpes 5/31/2013    Right Achilles tendinitis 5/2/2017    Sarcoidosis     Sickle cell trait ([facility]     Tobacco use disorder     Type 2 diabetes mellitus with diabetic neuropathy, with long-term current use of insulin ([facility])     HbA1c (12/11/2019) = 7.1    Urge urinary incontinence 5/10/2017    Venous insufficiency     Wears glasses       Patient taking anticoagulants no    Patient has a defibrillator: no     Assessment:   Changes in Assessment throughout shift: None     Patient has central line: no Reasons if yes: Removed 12/16/19  Insertion date:n/a Last dressing date:n/a   Patient has Renae Cath: no Reasons if yes: n/a   Insertion date:n/a     Last Vitals:     Vitals:    01/09/20 1541 01/09/20 2203 01/10/20 0721 01/10/20 1553   BP: 121/71 129/75 124/70 113/64   Pulse: 85 61 68 68   Resp: 18 16 17 18   Temp: 97.8 °F (36.6 °C) 99 °F (37.2 °C) 97.9 °F (36.6 °C) 97.5 °F (36.4 °C)   SpO2: 96% 95% 93% 94%   Weight:       LMP: 11/25/2012        PAIN    Pain Assessment    Pain Intensity 1: 8 (01/10/20 1539) Pain Intensity 1: 2 (12/29/14 1105)    Pain Location 1: Rectal Pain Location 1: Abdomen    Pain Intervention(s) 1: Medication (see MAR) Pain Intervention(s) 1: Medication (see MAR)  Patient Stated Pain Goal: 0 Patient Stated Pain Goal: 0  o Intervention effective: yes    o Other actions taken for pain: no c/o     Skin Assessment  Skin color Skin Color: Appropriate for ethnicity  Condition/Temperature Skin Condition/Temp: Warm, Dry  Integrity Skin Integrity: Incision (comment)  Turgor Turgor: Non-tenting  Weekly Pressure Ulcer Documentation  Pressure  Injury Documentation: No Pressure Injury Noted-Pressure Ulcer Prevention Initiated  Wound Prevention & Protection Methods  Orientation of wound Orientation of Wound Prevention: Posterior  Location of Prevention Location of Wound Prevention: Buttocks, Sacrum/Coccyx  Dressing Present Dressing Present : No  Dressing Status Dressing Status: Intact  Wound Offloading Wound Offloading (Prevention Methods): Adaptive equipment, Bed, pressure reduction mattress, Chair cushion, Pillows, Repositioning, Turning, Wheelchair     INTAKE/OUPUT  Date 01/09/20 1900 - 01/10/20 0659 01/10/20 0700 - 01/11/20 0659   Shift 6600-9732 24 Hour Total 7381-8772 2203-7223 24 Hour Total   INTAKE   P.O.   360  360     P. O.   360  360   Shift Total(mL/kg)   360(4.6)  360(4.6)   OUTPUT   Urine(mL/kg/hr)          Urine Occurrence(s) 2 x 4 x 1 x  1 x   Stool          Stool Occurrence(s) 0 x 2 x 1 x  1 x   Shift Total(mL/kg)        NET   360  360   Weight (kg) 78.2 78.2 78.2 78.2 78.2       Recommendations:  1. Patient needs and requests: met    2. Diet: Cardiac DM Reg /thin liq    3. Pending tests/procedures: none     4. Functional Level/Equipment: wheelchair    5. Estimated Discharge Date: TBD Posted on Whiteboard in Patients Room: yes     HEALS Safety Check    A safety check occurred in the patient's room between off going nurse and oncoming nurse listed above.     The safety check included the below items  Area Items   H  High Alert Medications - Verify all high alert medication drips (heparin, PCA, etc.)   E  Equipment - Suction is set up for ALL patients (with yanker)  - Red plugs utilized for all equipment (IV pumps, etc.)  - WOWs wiped down at end of shift.  - Room stocked with oxygen, suction, and other unit-specific supplies   A  Alarms - Bed alarm is set for fall risk patients  - Ensure chair alarm is in place and activated if patient is up in a chair   L  Lines - Check IV for any infiltration  - Renae bag is empty if patient has a Renae   - Tubing and IV bags are labeled   S  Safety   - Room is clean, patient is clean, and equipment is clean. - Hallways are clear from equipment besides carts. - Fall bracelet on for fall risk patients  - Ensure room is clear and free of clutter  - Suction is set up for ALL patients (with chantellker)  - Hallways are clear from equipment besides carts.    - Isolation precautions followed, supplies available outside room, sign posted

## 2020-01-11 NOTE — PROGRESS NOTES
OCCUPATIONAL THERAPY TREATMENT    Patient: Elza Arauz Roots   62 y.o. Patient identified with name and : Yes    Date: 2020    First Tx Session  Time In: 56  Time Out[de-identified] 1010    Diagnosis: Status post laminectomy with spinal fusion [Z98.1]   Precautions: Fall, Spinal(thoracic)  Chart, occupational therapy assessment, plan of care, and goals were reviewed. Pain:  Pt reports 0/10 pain or discomfort prior to treatment. Pt reports 0/10 pain or discomfort post treatment. Intervention Provided: N/A      SUBJECTIVE:   Patient stated I have to use the bathroom.     OBJECTIVE DATA SUMMARY:     TOILETING Daily Assessment   Max A for toileting hygiene and clothing management. MOBILITY/TRANSFERS Daily Assessment   Patient's daughter present for family education/training for accuracy and technique with BSC transfer using sliding board. Patient's daughter does transfer and provided verbal cues to both for hand placement and adhering to spinal precautions. Patient requires max A for toileting hygiene, however it is noted that she has CNAs at home (her daughter is the evening one) and this is her baseline. ASSESSMENT:  Patient's daughter verbalizes good understanding of BSC transfer with sliding board and both return good demo with minimal cues from this therapist for positioning and adhering to spinal precautions. Progression toward goals:  [x]          Improving appropriately and progressing toward goals  []          Improving slowly and progressing toward goals  []          Not making progress toward goals and plan of care will be adjusted     PLAN:  Patient continues to benefit from skilled intervention to address the above impairments. Continue treatment per established plan of care.   Discharge Recommendations:  Home Health  Further Equipment Recommendations for Discharge:  drop down BSC     Activity Tolerance:  Good    Estimated LOS:per OT POC    Please refer to the flowsheet for vital signs taken during this treatment. After treatment:   [x]  Patient left in no apparent distress sitting up in chair   []  Patient left in no apparent distress in bed  [x]  Call bell left within reach  []  Nursing notified  [x]  Caregiver present  []  Bed alarm activated    COMMUNICATION/EDUCATION:   [] Home safety education was provided and the patient/caregiver indicated understanding. [] Patient/family have participated as able in goal setting and plan of care. [x] Patient/family agree to work toward stated goals and plan of care. [] Patient understands intent and goals of therapy, but is neutral about his/her participation. [] Patient is unable to participate in goal setting and plan of care. Jill Enriquez, OTR/L

## 2020-01-11 NOTE — PROGRESS NOTES
Problem: Mobility Impaired (Adult and Pediatric)  Goal: *Therapy Goal (Edit Goal, Insert Text)  Description  Physical Therapy Goals:  STG= LTG  Initiated 2019, revised 2020 and to be accomplished within 28 day(s) on 2020:   1. Patient will move from supine to sit and sit to supine with min A, and roll side to side in bed with contact guard assist.   2.  Patient will transfer from bed to chair and chair to bed with contact guard assist using lateral transfer board, equal heights; and min A with incline to higher surface (24\" height bed)  3. Patient will perform sit to stand with moderate assistance and least restrictive device. 4.  Patient will perform car transfer with lateral transfer board and min A.   5.  Patient will perform wheelchair mobility over even surfaces at mod independent level for at least 150 ft, including negotiation of doorways. 6.  Patient will perform wheelchair mobility up/down ramp, uneven sidewalk min A level. Outcome: Progressing Towards Goal   PHYSICAL THERAPY TREATMENT    Patient: Jef Sinha (65 y.o. female)  Date: 2020  Diagnosis: Status post laminectomy with spinal fusion [Z98.1] Status post laminectomy with spinal fusion  Precautions: Fall, Spinal(thoracic)  Chart, physical therapy assessment, plan of care and goals were reviewed. Time In: 915  Time Out: 930  Patient Seen For: Family training;Patient education;Transfer training     Treatment session delayed as patient still eating breakfast and patient's daughter arriving late due to traffic. Pain:  Patient did not indicate increased pain during treatment session. Patient identified with name and : yes    SUBJECTIVE:     Patient's daughter present during session for family education today. Patient needing re-direction to task throughout session, patient's daughter providing most of re-direction.      OBJECTIVE DATA SUMMARY:   Objective:     BED/MAT MOBILITY Daily Assessment Rolling Right : 3 (Moderate assistance )  Rolling Left : 3 (Moderate assistance )  Supine to Sit : 3 (Moderate assistance)  Sit to Supine : 3 (Moderate assistance)     Emphasis on patient/family education regarding logrolling technique and maintaining spinal precautions, appropriate assist needed to provide patient at bilat LE to stabilize during logrolling and also assist at trunk to come into sitting at edge of bed. Patient able to appropriately assist with scooting herself forward, cues for keeping trunk forward. TRANSFERS Daily Assessment    Transfer Type: Lateral with transfer board  Transfer Assistance : 3 (Moderate assistance )     Patient's daughter educated regarding body mechanics, guarding technique including positioning of patient's feet, blocking of knees appropriately and allowing patient to lean adequately forward to assist with scooting along board. ASSESSMENT:  Patient's daughter able to demonstrate appropriate guarding/assist techniques with cues from therapist and additional communication with patient. Patient able to verbalize her needs to her daughter as well. Progression toward goals:  []      Improving appropriately and progressing toward goals  [x]      Improving slowly and progressing toward goals  []      Not making progress toward goals and plan of care will be adjusted     PLAN:  Patient continues to benefit from skilled intervention to address the above impairments. Continue treatment per established plan of care. Discharge Recommendations:  [facility] PT and caregiver assist 24/7  Further Equipment Recommendations for Discharge:  Wheelchair, w/c ramp, transfer board     Estimated LOS: D/C planned for 1/14/2020    Activity Tolerance:   Fair. After treatment:   Patient left seated edge of bed with daughter present and hand off to OT for continued treatment.        Rohini Reynoso, PT  1/11/2020

## 2020-01-11 NOTE — PROGRESS NOTES
Progress Note    Patient: Ju Gonazlez MRN: [id number]  CSN: [id number]    YOB: 1955  Age: 62 y.o. Sex: female    DOA: 12/16/2019 LOS:  LOS: 26 days                    Subjective:     No acute concerns today. No pain. Primary Rehab Diagnosis: Impaired Mobility and ADLs secondary to:  1. S/P T10, T9, T8 laminectomy; T7, T8, T9, T10, T11, T12 posterior thoracic fusion; segmental spinal instrumentation; K2M [place] type, T7-T8, T11-T12 (12/11/2019 - Dr. Steven Saba)  2. History of acute compression fractures of body of thoracic vertebrae (T9 and T10) due to fall      Review of systems  General: No fevers or chills. Cardiovascular: No chest pain or pressure. No palpitations. Pulmonary: No shortness of breath, cough or wheeze h/o sarcoidosis . Gastrointestinal: No abdominal pain, nausea, vomiting or diarrhea. Genitourinary: No dysuria. Musculoskeletal: N no recent trauma. Neurologic: No headache, no seizure    Objective:     Physical Exam:  Visit Vitals  /72 (BP 1 Location: Left arm, BP Patient Position: Sitting)   Pulse 71   Temp 98.1 °F (36.7 °C)   Resp 18   Wt 78.2 kg (172 lb 6.4 oz)   SpO2 94%   Breastfeeding No   BMI 28.69 kg/m²        General:         Alert,  no acute distress    HEENT: NC, Atraumatic. Lungs: CTA Bilaterally. No Wheezing/Rhonchi/Rales. Heart:  Regular  rhythm,   Abdomen: Soft, Non distended, Non tender.    Extremities: No lower limb edema   Psych:   . Not agitated. feeling anxious   Neurologic:  CN 2-12 grossly intact, Alert and oriented X 3. At her base line Cranial nerves II-XII are grossly intact.  No gross sensory deficit.  Motor strength is 4-/5 on BUE, 2+/5 on the RLE (except for 1/5 on the right ankle), 2-/5 on the LLE (except for 2+/5 on the left knee). Intake and Output:  Current Shift:  No intake/output data recorded.   Last three shifts:  01/09 1901 - 01/11 0700  In: 360 [P.O.:360]  Out: -     Labs: Results:       Chemistry No results for input(s): GLU, NA, K, CL, CO2, BUN, CREA, CA, AGAP, BUCR, TBIL, GPT, AP, TP, ALB, GLOB, AGRAT in the last 72 hours. CBC w/Diff No results for input(s): WBC, RBC, HGB, HCT, PLT, GRANS, LYMPH, EOS, HGBEXT, HCTEXT, PLTEXT, HGBEXT, HCTEXT, PLTEXT in the last 72 hours. Cardiac Enzymes No results for input(s): CPK, CKND1, SANTOS in the last 72 hours. No lab exists for component: CKRMB, TROIP   Coagulation No results for input(s): PTP, INR, APTT, INREXT, INREXT in the last 72 hours. Lipid Panel Lab Results   Component Value Date/Time    Cholesterol, total 141 09/30/2019 12:00 AM    HDL Cholesterol 39 (L) 09/30/2019 12:00 AM    LDL, calculated 61 09/30/2019 12:00 AM    VLDL, calculated 41 (H) 09/30/2019 12:00 AM    Triglyceride 204 (H) 09/30/2019 12:00 AM    CHOL/HDL Ratio 3.5 11/06/2016 04:18 AM      BNP No results for input(s): BNPP in the last 72 hours. Liver Enzymes No results for input(s): TP, ALB, TBIL, AP, SGOT, GPT in the last 72 hours.     No lab exists for component: DBIL   Thyroid Studies Lab Results   Component Value Date/Time    TSH 0.74 12/11/2019 05:37 PM          Procedures/imaging: see electronic medical records for all procedures/Xrays and details which were not copied into this note but were reviewed prior to creation of Plan    Medications:   Current Facility-Administered Medications   Medication Dose Route Frequency    busPIRone (BUSPAR) tablet 5 mg  5 mg Oral TID    pantoprazole (PROTONIX) tablet 40 mg  40 mg Oral ACB    calcium carbonate (TUMS) chewable tablet 200 mg [elemental]  200 mg Oral TID PRN    insulin glargine (LANTUS) injection 25 Units  25 Units SubCUTAneous ACB    insulin lispro (HUMALOG) injection 4 Units  4 Units SubCUTAneous TIDAC    guaiFENesin ER (MUCINEX) tablet 600 mg  600 mg Oral Q12H    acetaminophen (TYLENOL) tablet 650 mg  650 mg Oral Q4H PRN    bisacodyl (DULCOLAX) tablet 10 mg  10 mg Oral Q48H PRN    ferrous sulfate tablet 325 mg  1 Tab Oral DAILY WITH BREAKFAST    ascorbic acid (vitamin C) (VITAMIN C) tablet 250 mg  250 mg Oral DAILY WITH BREAKFAST    insulin lispro (HUMALOG) injection   SubCUTAneous TIDAC    senna-docusate (PERICOLACE) 8.6-50 mg per tablet 2 Tab  2 Tab Oral PCD    methadone (DOLOPHINE) tablet 20 mg  20 mg Oral DAILY    oxyCODONE-acetaminophen (PERCOCET 10)  mg per tablet 1 Tab  1 Tab Oral Q4H PRN    predniSONE (DELTASONE) tablet 30 mg  30 mg Oral DAILY WITH BREAKFAST    rosuvastatin (CRESTOR) tablet 5 mg  5 mg Oral QHS    albuterol (PROVENTIL VENTOLIN) nebulizer solution 2.5 mg  2.5 mg Nebulization Q4H PRN    levothyroxine (SYNTHROID) tablet 75 mcg  75 mcg Oral 6am    cholecalciferol (VITAMIN D3) (400 Units /10 mcg) tablet 5 Tab  2,000 Units Oral DAILY    calcium citrate tablet 200 mg [elemental]  200 mg Oral BID    divalproex DR (DEPAKOTE) tablet 500 mg  500 mg Oral QHS    fluticasone-vilanterol (BREO ELLIPTA) 100mcg-25mcg/puff  1 Puff Inhalation DAILY    oxybutynin (DITROPAN) tablet 5 mg  5 mg Oral BID    allopurinol (ZYLOPRIM) tablet 100 mg  100 mg Oral DAILY    sertraline (ZOLOFT) tablet 50 mg  50 mg Oral DAILY    methocarbamol (ROBAXIN) tablet 500 mg  500 mg Oral Q8H    aspirin chewable tablet 81 mg  81 mg Oral DAILY WITH BREAKFAST    docusate sodium (COLACE) capsule 100 mg  100 mg Oral DAILY AFTER BREAKFAST       Assessment/Plan     Principal Problem:    Status post laminectomy with spinal fusion (12/11/2019)      Overview: S/P T10, T9, T8 laminectomy; T7, T8, T9, T10, T11, T12 posterior thoracic       fusion; segmental spinal instrumentation; K2M [place] type, T7-T8, T11-T12       (12/11/2019 - Dr. Wilver Quinteros)    Active Problems:    Chronic obstructive pulmonary disease (COPD) ([facility] ()      Overview: SOB, on [place]ula O2      Recent admission with psychosis      Hypertensive heart disease without heart failure ()      Overview: Better controlled      Type 2 diabetes mellitus with diabetic neuropathy, with long-term current use of insulin ([address] ()      Overview: HbA1c (12/11/2019) = 7.1      Hypoxemia requiring supplemental oxygen (12/29/2014)      Impaired mobility and ADLs (12/11/2019)      Spinal cord compression ([address] (12/11/2019)      Compression fracture of body of thoracic vertebra ([address] (12/11/2019)      Acute blood loss as cause of postoperative anemia (12/12/2019)      History of fracture due to fall (12/11/2019)      Chronic respiratory failure with hypoxia (HCC) ()      Overview: On home oxygen inhalation at 3 LPM via NC      Opioid dependence ([address] ()      Overview: On Methadone      Plan  S/P T10, T9, T8 laminectomy; T7, T8, T9, T10, T11, T12 posterior thoracic fusion; segmental spinal instrumentation; K2M [place] type, T7-T8, T11-T12 (12/11/2019 - Dr. Savi Novak);  History of acute compression fractures of body of thoracic vertebrae (T9 and T10) due to fall  Thoracic spinal precautions  Calcium citrate 200 mg PO BID   Cholecalciferol 2,000 units PO once daily    Acute Postoperative Blood Loss Anemia   FeSO4 325 mg PO once daily with breakfast   Ascorbic Acid 250 mg PO once daily with breakfast     Benign hypertensive heart and kidney disease with stage 3 chronic kidney disease without congestive heart failure  BP controlled  Metolazone was not given during the patient's hospital stay at [address] [place] [address] 2050, hold metolazone    Chronic obstructive pulmonary disease; Chronic respiratory failure with hypoxemia requiring supplemental oxygen (3 LPM)  Fluticasone-Vilanterol 100-25 1 puff inhaled once daily   O2 inhalation at 3 LPM via nasal cannula continuous    Opioid dependence  Continue Methadone 20 mg PO once daily     Type 2 diabetes mellitus with stage 3 chronic kidney disease  Continued Hyperglycemia  Insulin glargine 25 units PO once daily before breakfast  Increase Insulin lispro 4 units -> 6 units SC TID AC   Insulin lispro sliding scale SC TID AC only       Cough  Continue Guaifenesin  mg PO q 12 hr     Constipation  Continue colace 100mg daily, pericolace 2 tabs daily after dinner       Gastroesophageal reflux disease  Continue Pantoprazole 40 mg PO once daily before breakfast     Anxiety  Continue Buspirone 5 mg PO TID    Adam Huffman MD  1/11/2020 15:56

## 2020-01-11 NOTE — PROGRESS NOTES
SHIFT CHANGE NOTE FOR [facility]    Bedside and Verbal shift change report given to Jacki George RN (oncoming nurse) by Jamie Mcdaniels (offgoing nurse). Report included the following information SBAR, Kardex, MAR and Recent Results.     Situation:   Code Status: Full Code   Reason for Admission: Spinal Thoracic laminectomy T 6-T [address] Day: 26   Problem List:   Hospital Problems  Date Reviewed: 1/10/2020          Codes Class Noted POA    Chronic respiratory failure with hypoxia (HCC) (Chronic) ICD-10-CM: J96.11  ICD-9-CM: 518.83, 799.02  Unknown Yes    Overview Signed 12/16/2019 10:11 PM by Bernard Sandoval MD     On home oxygen inhalation at 3 LPM via NC             Opioid dependence ([facility] [address] (Chronic) ICD-10-CM: F11.20  ICD-9-CM: 304.00  Unknown Yes    Overview Signed 12/16/2019 10:54 PM by Bernard Sandoval MD     On Methadone             Acute blood loss as cause of postoperative anemia ICD-10-CM: D62  ICD-9-CM: 285.1  12/12/2019 Yes        Impaired mobility and ADLs ICD-10-CM: Z74.09  ICD-9-CM: 799.89  12/11/2019 Yes        Spinal cord compression ([facility] ICD-10-CM: G95.20  ICD-9-CM: 336.9  12/11/2019 Yes        Compression fracture of body of thoracic vertebra (HCC) ICD-10-CM: S22.000A  ICD-9-CM: 805.2  12/11/2019 Yes        * (Principal) Status post laminectomy with spinal fusion ICD-10-CM: Z98.1  ICD-9-CM: V45.4  12/11/2019 Yes    Overview Signed 12/16/2019 10:05 PM by Bernard Sandoval MD     S/P T10, T9, T8 laminectomy; T7, T8, T9, T10, T11, T12 posterior thoracic fusion; segmental spinal instrumentation; K2M [place] type, T7-T8, T11-T12 (12/11/2019 - Dr. Nuris Bradshaw)             History of fracture due to fall ICD-10-CM: Z87.81  ICD-9-CM: V15.51  12/11/2019 Yes        Hypoxemia requiring supplemental oxygen (Chronic) ICD-10-CM: R09.02, Z99.81  ICD-9-CM: 799.02  12/29/2014 Yes        Chronic obstructive pulmonary disease (COPD) (HCC) (Chronic) ICD-10-CM: J44.9  ICD-9-CM: 496  Unknown Yes    Overview Signed 4/23/2013 10:01 AM by Paul Frias     SOB, on pa[place] O2  Recent admission with psychosis             Hypertensive heart disease without heart failure (Chronic) ICD-10-CM: I11.9  ICD-9-CM: 402.90  Unknown Yes    Overview Signed 4/23/2013 10:02 AM by Diana CALLES     Better controlled             Type 2 diabetes mellitus with diabetic neuropathy, with long-term current use of insulin ([facility] (Chronic) ICD-10-CM: E11.40, Z79.4  ICD-9-CM: 250.60, 357.2, V58.67  Unknown Yes    Overview Signed 12/18/2019  2:13 PM by Elder Rucker MD     HbA1c (12/11/2019) = 7.1                   Background:   Past Medical History:   Past Medical History:   Diagnosis Date    Abnormally low high density lipoprotein (HDL) cholesterol with hypertriglyceridemia     Lipid profile (11/6/2016) showed , , HDL 38, LDL 37    Acute blood loss as cause of postoperative anemia 12/12/2019    Anxiety     Bipolar affective disorder ([facility] 12/5/2012    Cellulitis of right forearm 05/04/2017    Chronic back pain     Chronic obstructive pulmonary disease (COPD) (HCC)     SOB, on paula O2 Recent admission with psychosis     Chronic respiratory failure with hypoxia (HCC)     On home oxygen inhalation at 3 LPM via NC    Contusion of left elbow 5/4/2017    Depression     Diabetic neuropathy associated with type 2 diabetes mellitus (HCC)     Diastolic dysfunction without heart failure     Stable on diuretics     Falls frequently     Gastroesophageal reflux disease with hiatal hernia     Generalized osteoarthritis of multiple sites     Gout     On Allopurinol    History of acute renal failure 5/31/2013    History of back injury     jumped out of second story window     History of fracture due to fall 12/11/2019    History of hepatitis C     treated    History of penicillin allergy     History of vitamin D deficiency 5/10/2017    Hypertensive heart disease without heart failure     Better controlled     Hyperuricemia 5/26/2017    Hypothyroidism     Hypoxemia requiring supplemental oxygen 12/29/2014    Intravenous drug user 5/2/2017    Long term current use of aspirin     Memory difficulty     Menopause     Mixed connective tissue disease ([facility] 5/10/2017    Obesity, Class I, BMI 30-34.9     Olecranon bursitis of right elbow 5/4/2017    Opio[facility] dependence ([facility]     On Methadone    Recurrent genital herpes 5/31/2013    Right Achilles tendinitis 5/2/2017    Sarcoidosis     Sickle cell trait ([facility]     Tobacco use disorder     Type 2 diabetes mellitus with diabetic neuropathy, with long-term current use of insulin ([facility])     HbA1c (12/11/2019) = 7.1    Urge urinary incontinence 5/10/2017    Venous insufficiency     Wears glasses       Patient taking anticoagulants no    Patient has a defibrillator: no     Assessment:   Changes in Assessment throughout shift: None     Patient has central line: no Reasons if yes: Removed 12/16/19  Insertion date:n/a Last dressing date:n/a   Patient has Renae Cath: no Reasons if yes: n/a   Insertion date:n/a     Last Vitals:     Vitals:    01/10/20 1553 01/10/20 2206 01/11/20 0818 01/11/20 1554   BP: 113/64 113/62 119/72 112/66   Pulse: 68 94 71 70   Resp: 18 18 18 18   Temp: 97.5 °F (36.4 °C) 98.6 °F (37 °C) 98.1 °F (36.7 °C) 97.6 °F (36.4 °C)   SpO2: 94% 94% 94% 95%   Weight:       LMP: 11/25/2012        PAIN    Pain Assessment    Pain Intensity 1: 0 (01/11/20 1608) Pain Intensity 1: 2 (12/29/14 1105)    Pain Location 1: Back Pain Location 1: Abdomen    Pain Intervention(s) 1: Medication (see MAR) Pain Intervention(s) 1: Medication (see MAR)  Patient Stated Pain Goal: 0 Patient Stated Pain Goal: 0  o Intervention effective: yes    o Other actions taken for pain: no c/o     Skin Assessment  Skin color Skin Color: Appropriate for ethnicity  Condition/Temperature Skin Condition/Temp: Warm  Integrity Skin Integrity: Incision (comment)  Turgor Turgor: Non-tenting  Weekly Pressure Ulcer Documentation  Pressure  Injury Documentation: No Pressure Injury Noted-Pressure Ulcer Prevention Initiated  Wound Prevention & Protection Methods  Orientation of wound Orientation of Wound Prevention: Posterior  Location of Prevention Location of Wound Prevention: Sacrum/Coccyx  Dressing Present Dressing Present : No  Dressing Status Dressing Status: Intact  Wound Offloading Wound Offloading (Prevention Methods): Bed, pressure redistribution/air, Bed, pressure reduction mattress, Adaptive equipment     INTAKE/OUPUT  Date 01/10/20 0700 - 01/11/20 0659 01/11/20 0700 - 01/12/20 0659   Shift 0700-1859 1900-0659 24 Hour Total 0700-1859 1900-0659 24 Hour Total   INTAKE   P.O. 360  360        P. O. 360  360      Shift Total(mL/kg) 360(4.6)  360(4.6)      OUTPUT   Urine(mL/kg/hr)           Urine Occurrence(s) 1 x 2 x 3 x 2 x  2 x   Stool           Stool Occurrence(s) 1 x 0 x 1 x 1 x  1 x   Shift Total(mL/kg)           360      Weight (kg) 78.2 78.2 78.2 78.2 78.2 78.2       Recommendations:  1. Patient needs and requests: met    2. Diet: Cardiac DM Reg /thin liq    3. Pending tests/procedures: none     4. Functional Level/Equipment: wheelchair    5. Estimated Discharge Date: TBD Posted on Whiteboard in Patients Room: yes     HEALS Safety Check    A safety check occurred in the patient's room between off going nurse and oncoming nurse listed above.     The safety check included the below items  Area Items   H  High Alert Medications - Verify all high alert medication drips (heparin, PCA, etc.)   E  Equipment - Suction is set up for ALL patients (with yanker)  - Red plugs utilized for all equipment (IV pumps, etc.)  - WOWs wiped down at end of shift.  - Room stocked with oxygen, suction, and other unit-specific supplies   A  Alarms - Bed alarm is set for fall risk patients  - Ensure chair alarm is in place and activated if patient is up in a chair   L  Lines - Check IV for any infiltration  - Renae bag is empty if patient has a Renae   - Tubing and IV bags are labeled   S  Safety   - Room is clean, patient is clean, and equipment is clean. - Hallways are clear from equipment besides carts. - Fall bracelet on for fall risk patients  - Ensure room is clear and free of clutter  - Suction is set up for ALL patients (with yanker)  - Hallways are clear from equipment besides carts.    - Isolation precautions followed, supplies available outside room, sign posted

## 2020-01-12 NOTE — PROGRESS NOTES
SHIFT CHANGE NOTE FOR [facility]    Bedside and Verbal shift change report given to Kale Kirby RN (oncoming nurse) by Alicia Dior RN (offgoing nurse). Report included the following information SBAR, Kardex, MAR and Recent Results.     Situation:   Code Status: Full Code   Reason for Admission: Spinal Thoracic laminectomy T 6-T [email or web address] Day: 27   Problem List:   Hospital Problems  Date Reviewed: 1/10/2020          Codes Class Noted POA    Chronic respiratory failure with hypoxia (HCC) (Chronic) ICD-10-CM: J96.11  ICD-9-CM: 518.83, 799.02  Unknown Yes    Overview Signed 12/16/2019 10:11 PM by Venu Hummel MD     On home oxygen inhalation at 3 LPM via NC             Opioid dependence ([facility] [address] (Chronic) ICD-10-CM: F11.20  ICD-9-CM: 304.00  Unknown Yes    Overview Signed 12/16/2019 10:54 PM by Venu Hummel MD     On Methadone             Acute blood loss as cause of postoperative anemia ICD-10-CM: D62  ICD-9-CM: 285.1  12/12/2019 Yes        Impaired mobility and ADLs ICD-10-CM: Z74.09  ICD-9-CM: 799.89  12/11/2019 Yes        Spinal cord compression ([facility] [address] ICD-10-CM: G95.20  ICD-9-CM: 336.9  12/11/2019 Yes        Compression fracture of body of thoracic vertebra (HCC) ICD-10-CM: S22.000A  ICD-9-CM: 805.2  12/11/2019 Yes        * (Principal) Status post laminectomy with spinal fusion ICD-10-CM: Z98.1  ICD-9-CM: V45.4  12/11/2019 Yes    Overview Signed 12/16/2019 10:05 PM by Venu Hummel MD     S/P T10, T9, T8 laminectomy; T7, T8, T9, T10, T11, T12 posterior thoracic fusion; segmental spinal instrumentation; K2M [place] type, T7-T8, T11-T12 (12/11/2019 - Dr. Lorena López)             History of fracture due to fall ICD-10-CM: Z87.81  ICD-9-CM: V15.51  12/11/2019 Yes        Hypoxemia requiring supplemental oxygen (Chronic) ICD-10-CM: R09.02, Z99.81  ICD-9-CM: 799.02  12/29/2014 Yes        Chronic obstructive pulmonary disease (COPD) (HCC) (Chronic) ICD-10-CM: J44.9  ICD-9-CM: 496  Unknown Yes    Overview Signed 4/23/2013 10:01 AM by Екатерина Orellana     SOB, on paula O2  Recent admission with psychosis             Hypertensive heart disease without heart failure (Chronic) ICD-10-CM: I11.9  ICD-9-CM: 402.90  Unknown Yes    Overview Signed 4/23/2013 10:02 AM by Clara CALLES     Better controlled             Type 2 diabetes mellitus with diabetic neuropathy, with long-term current use of insulin ([facility] [address] (Chronic) ICD-10-CM: E11.40, Z79.4  ICD-9-CM: 250.60, 357.2, V58.67  Unknown Yes    Overview Signed 12/18/2019  2:13 PM by Ana María Lowe MD     HbA1c (12/11/2019) = 7.1                   Background:   Past Medical History:   Past Medical History:   Diagnosis Date    Abnormally low high density lipoprotein (HDL) cholesterol with hypertriglyceridemia     Lipid profile (11/6/2016) showed , , HDL 38, LDL 37    Acute blood loss as cause of postoperative anemia 12/12/2019    Anxiety     Bipolar affective disorder ([facility] [address] 12/5/2012    Cellulitis of right forearm 05/04/2017    Chronic back pain     Chronic obstructive pulmonary disease (COPD) (HCC)     SOB, on paula O2 Recent admission with psychosis     Chronic respiratory failure with hypoxia (HCC)     On home oxygen inhalation at 3 LPM via NC    Contusion of left elbow 5/4/2017    Depression     Diabetic neuropathy associated with type 2 diabetes mellitus (HCC)     Diastolic dysfunction without heart failure     Stable on diuretics     Falls frequently     Gastroesophageal reflux disease with hiatal hernia     Generalized osteoarthritis of multiple sites     Gout     On Allopurinol    History of acute renal failure 5/31/2013    History of back injury     jumped out of second story window     History of fracture due to fall 12/11/2019    History of hepatitis C     treated    History of penicillin allergy     History of vitamin D deficiency 5/10/2017    Hypertensive heart disease without heart failure     Better controlled     Hyperuricemia 5/26/2017    Hypothyroidism     Hypoxemia requiring supplemental oxygen 12/29/2014    Intravenous drug user 5/2/2017    Long term current use of aspirin     Memory difficulty     Menopause     Mixed connective tissue disease ([facility] [address] 5/10/2017    Obesity, Class I, BMI 30-34.9     Olecranon bursitis of right elbow 5/4/2017    Opioid dependence ([facility]     On Methadone    Recurrent genital herpes 5/31/2013    Right Achilles tendinitis 5/2/2017    Sarcoidosis     Sickle cell trait ([facility]     Tobacco use disorder     Type 2 diabetes mellitus with diabetic neuropathy, with long-term current use of insulin ([facility])     HbA1c (12/11/2019) = 7.1    Urge urinary incontinence 5/10/2017    Venous insufficiency     Wears glasses       Patient taking anticoagulants no    Patient has a defibrillator: no     Assessment:   Changes in Assessment throughout shift: None     Patient has central line: no Reasons if yes: Removed 12/16/19  Insertion date:n/a Last dressing date:n/a   Patient has Renae Cath: no Reasons if yes: n/a   Insertion date:n/a     Last Vitals:     Vitals:    01/10/20 2206 01/11/20 0818 01/11/20 1554 01/11/20 2148   BP: 113/62 119/72 112/66 121/68   Pulse: 94 71 70 73   Resp: 18 18 18 16   Temp: 98.6 °F (37 °C) 98.1 °F (36.7 °C) 97.6 °F (36.4 °C) 98.2 °F (36.8 °C)   SpO2: 94% 94% 95% 97%   Weight:       LMP: 11/25/2012        PAIN    Pain Assessment    Pain Intensity 1: 8 (01/12/20 0600) Pain Intensity 1: 2 (12/29/14 1105)    Pain Location 1: Perineum Pain Location 1: Abdomen    Pain Intervention(s) 1: Medication (see MAR) Pain Intervention(s) 1: Medication (see MAR)  Patient Stated Pain Goal: 0 Patient Stated Pain Goal: 0  o Intervention effective: yes    o Other actions taken for pain: no c/o     Skin Assessment  Skin color Skin Color: Appropriate for ethnicity  Condition/Temperature Skin Condition/Temp: Dry, Warm  Integrity Skin Integrity: Incision (comment)  Turgor Turgor: Non-tenting  Weekly Pressure Ulcer Documentation  Pressure  Injury Documentation: No Pressure Injury Noted-Pressure Ulcer Prevention Initiated  Wound Prevention & Protection Methods  Orientation of wound Orientation of Wound Prevention: Posterior  Location of Prevention Location of Wound Prevention: Sacrum/Coccyx  Dressing Present Dressing Present : No  Dressing Status Dressing Status: Intact  Wound Offloading Wound Offloading (Prevention Methods): Bed, pressure redistribution/air, Repositioning     INTAKE/OUPUT  Date 01/11/20 0700 - 01/12/20 0659 01/12/20 0700 - 01/13/20 0659   Shift 0700-1859 1900-0659 24 Hour Total 0700-1859 1900-0659 24 Hour Total   INTAKE   Shift Total(mL/kg)         OUTPUT   Urine(mL/kg/hr)           Urine Occurrence(s) 2 x 4 x 6 x 0 x  0 x   Stool           Stool Occurrence(s) 1 x 1 x 2 x 0 x  0 x   Shift Total(mL/kg)         NET         Weight (kg) 78.2 78.2 78.2 78.2 78.2 78.2       Recommendations:  1. Patient needs and requests: met    2. Diet: Cardiac DM Reg /thin liq    3. Pending tests/procedures: none     4. Functional Level/Equipment: wheelchair    5. Estimated Discharge Date: TBD Posted on Whiteboard in Patients Room: yes     HEALS Safety Check    A safety check occurred in the patient's room between off going nurse and oncoming nurse listed above.     The safety check included the below items  Area Items   H  High Alert Medications - Verify all high alert medication drips (heparin, PCA, etc.)   E  Equipment - Suction is set up for ALL patients (with yanker)  - Red plugs utilized for all equipment (IV pumps, etc.)  - WOWs wiped down at end of shift.  - Room stocked with oxygen, suction, and other unit-specific supplies   A  Alarms - Bed alarm is set for fall risk patients  - Ensure chair alarm is in place and activated if patient is up in a chair   L  Lines - Check IV for any infiltration  - Renae bag is empty if patient has a Renae   - Tubing and IV bags are labeled   S  Safety   - Room is clean, patient is clean, and equipment is clean. - Hallways are clear from equipment besides carts. - Fall bracelet on for fall risk patients  - Ensure room is clear and free of clutter  - Suction is set up for ALL patients (with lashay)  - Hallways are clear from equipment besides carts.    - Isolation precautions followed, supplies available outside room, sign posted

## 2020-01-12 NOTE — PROGRESS NOTES
Progress Note    Patient: Ju Gonzalez MRN: [id number]  CSN: [id number]    YOB: 1955  Age: 62 y.o. Sex: female    DOA: 12/16/2019 LOS:  LOS: 27 days                    Subjective:     No acute concerns today. No pain. Primary Rehab Diagnosis: Impaired Mobility and ADLs secondary to:  1. S/P T10, T9, T8 laminectomy; T7, T8, T9, T10, T11, T12 posterior thoracic fusion; segmental spinal instrumentation; K2M [place] type, T7-T8, T11-T12 (12/11/2019 - Dr. Steven Saba)  2. History of acute compression fractures of body of thoracic vertebrae (T9 and T10) due to fall      Review of systems  General: No fevers or chills. Cardiovascular: No chest pain or pressure. No palpitations. Pulmonary: No shortness of breath, cough or wheeze h/o sarcoidosis . Gastrointestinal: No abdominal pain, nausea, vomiting or diarrhea. Genitourinary: No dysuria. Musculoskeletal: N no recent trauma. Neurologic: No headache, no seizure    Objective:     Physical Exam:  Visit Vitals  /73 (BP 1 Location: Left arm, BP Patient Position: Sitting)   Pulse 73   Temp 97.9 °F (36.6 °C)   Resp 18   Wt 78.2 kg (172 lb 6.4 oz)   SpO2 97%   Breastfeeding No   BMI 28.69 kg/m²        General:         Alert,  no acute distress    HEENT: NC, Atraumatic. Lungs: End expiratory wheezes bilaterally  Heart:  Regular  rhythm,   Abdomen: Soft, Non distended, Non tender.    Extremities: No lower limb edema   Psych:   . Not agitated. feeling anxious   Neurologic:  CN 2-12 grossly intact, Alert and oriented X 3. At her base line Cranial nerves II-XII are grossly intact.  No gross sensory deficit.  Motor strength is 4-/5 on BUE, 2+/5 on the RLE (except for 1/5 on the right ankle), 2-/5 on the LLE (except for 2+/5 on the left knee). Intake and Output:  Current Shift:  No intake/output data recorded. Last three shifts:  No intake/output data recorded.     Labs: Results:       Chemistry No results for input(s): GLU, NA, K, CL, CO2, BUN, CREA, CA, AGAP, BUCR, TBIL, GPT, AP, TP, ALB, GLOB, AGRAT in the last 72 hours. CBC w/Diff No results for input(s): WBC, RBC, HGB, HCT, PLT, GRANS, LYMPH, EOS, HGBEXT, HCTEXT, PLTEXT, HGBEXT, HCTEXT, PLTEXT in the last 72 hours. Cardiac Enzymes No results for input(s): CPK, CKND1, SANTOS in the last 72 hours. No lab exists for component: CKRMB, TROIP   Coagulation No results for input(s): PTP, INR, APTT, INREXT, INREXT in the last 72 hours. Lipid Panel Lab Results   Component Value Date/Time    Cholesterol, total 141 09/30/2019 12:00 AM    HDL Cholesterol 39 (L) 09/30/2019 12:00 AM    LDL, calculated 61 09/30/2019 12:00 AM    VLDL, calculated 41 (H) 09/30/2019 12:00 AM    Triglyceride 204 (H) 09/30/2019 12:00 AM    CHOL/HDL Ratio 3.5 11/06/2016 04:18 AM      BNP No results for input(s): BNPP in the last 72 hours. Liver Enzymes No results for input(s): TP, ALB, TBIL, AP, SGOT, GPT in the last 72 hours.     No lab exists for component: DBIL   Thyroid Studies Lab Results   Component Value Date/Time    TSH 0.74 12/11/2019 05:37 PM          Procedures/imaging: see electronic medical records for all procedures/Xrays and details which were not copied into this note but were reviewed prior to creation of Plan    Medications:   Current Facility-Administered Medications   Medication Dose Route Frequency    insulin lispro (HUMALOG) injection 6 Units  6 Units SubCUTAneous TIDAC    busPIRone (BUSPAR) tablet 5 mg  5 mg Oral TID    pantoprazole (PROTONIX) tablet 40 mg  40 mg Oral ACB    calcium carbonate (TUMS) chewable tablet 200 mg [elemental]  200 mg Oral TID PRN    insulin glargine (LANTUS) injection 25 Units  25 Units SubCUTAneous ACB    guaiFENesin ER (MUCINEX) tablet 600 mg  600 mg Oral Q12H    acetaminophen (TYLENOL) tablet 650 mg  650 mg Oral Q4H PRN    bisacodyl (DULCOLAX) tablet 10 mg  10 mg Oral Q48H PRN    ferrous sulfate tablet 325 mg  1 Tab Oral DAILY WITH BREAKFAST    ascorbic acid (vitamin C) (VITAMIN C) tablet 250 mg  250 mg Oral DAILY WITH BREAKFAST    insulin lispro (HUMALOG) injection   SubCUTAneous TIDAC    senna-docusate (PERICOLACE) 8.6-50 mg per tablet 2 Tab  2 Tab Oral PCD    methadone (DOLOPHINE) tablet 20 mg  20 mg Oral DAILY    oxyCODONE-acetaminophen (PERCOCET 10)  mg per tablet 1 Tab  1 Tab Oral Q4H PRN    predniSONE (DELTASONE) tablet 30 mg  30 mg Oral DAILY WITH BREAKFAST    rosuvastatin (CRESTOR) tablet 5 mg  5 mg Oral QHS    albuterol (PROVENTIL VENTOLIN) nebulizer solution 2.5 mg  2.5 mg Nebulization Q4H PRN    levothyroxine (SYNTHROID) tablet 75 mcg  75 mcg Oral 6am    cholecalciferol (VITAMIN D3) (400 Units /10 mcg) tablet 5 Tab  2,000 Units Oral DAILY    calcium citrate tablet 200 mg [elemental]  200 mg Oral BID    divalproex DR (DEPAKOTE) tablet 500 mg  500 mg Oral QHS    fluticasone-vilanterol (BREO ELLIPTA) 100mcg-25mcg/puff  1 Puff Inhalation DAILY    oxybutynin (DITROPAN) tablet 5 mg  5 mg Oral BID    allopurinol (ZYLOPRIM) tablet 100 mg  100 mg Oral DAILY    sertraline (ZOLOFT) tablet 50 mg  50 mg Oral DAILY    methocarbamol (ROBAXIN) tablet 500 mg  500 mg Oral Q8H    aspirin chewable tablet 81 mg  81 mg Oral DAILY WITH BREAKFAST    docusate sodium (COLACE) capsule 100 mg  100 mg Oral DAILY AFTER BREAKFAST       Assessment/Plan     Principal Problem:    Status post laminectomy with spinal fusion (12/11/2019)      Overview: S/P T10, T9, T8 laminectomy; T7, T8, T9, T10, T11, T12 posterior thoracic       fusion; segmental spinal instrumentation; K2M [place] type, T7-T8, T11-T12       (12/11/2019 - Dr. Ish Joshi)    Active Problems:    Chronic obstructive pulmonary disease (COPD) ([facility] [address] ()      Overview: SOB, on paula O2      Recent admission with psychosis      Hypertensive heart disease without heart failure ()      Overview: Better controlled      Type 2 diabetes mellitus with diabetic neuropathy, with long-term current use of insulin (HCC) ()      Overview: HbA1c (12/11/2019) = 7.1      Hypoxemia requiring supplemental oxygen (12/29/2014)      Impaired mobility and ADLs (12/11/2019)      Spinal cord compression ([address] (12/11/2019)      Compression fracture of body of thoracic vertebra ([address] (12/11/2019)      Acute blood loss as cause of postoperative anemia (12/12/2019)      History of fracture due to fall (12/11/2019)      Chronic respiratory failure with hypoxia (HCC) ()      Overview: On home oxygen inhalation at 3 LPM via NC      Opioid dependence ([facility] [address] ()      Overview: On Methadone      Plan  S/P T10, T9, T8 laminectomy; T7, T8, T9, T10, T11, T12 posterior thoracic fusion; segmental spinal instrumentation; K2M [place] type, T7-T8, T11-T12 (12/11/2019 - Dr. Demarco Key);  History of acute compression fractures of body of thoracic vertebrae (T9 and T10) due to fall  Thoracic spinal precautions  Calcium citrate 200 mg PO BID   Cholecalciferol 2,000 units PO once daily    Acute Postoperative Blood Loss Anemia   FeSO4 325 mg PO once daily with breakfast   Ascorbic Acid 250 mg PO once daily with breakfast     Benign hypertensive heart and kidney disease with stage 3 chronic kidney disease without congestive heart failure  BP controlled  Metolazone was not given during the patient's hospital stay at [facility], hold metolazone    Chronic obstructive pulmonary disease; Chronic respiratory failure with hypoxemia requiring supplemental oxygen (3 LPM)  Fluticasone-Vilanterol 100-25 1 puff inhaled once daily   O2 inhalation at 3 LPM via nasal cannula continuous  Wheezing on exam today, discussed with nurse will give her prn albuterol nebulizer today, monitor    Opioid dependence  Continue Methadone 20 mg PO once daily     Type 2 diabetes mellitus with stage 3 chronic kidney disease  Continued Hyperglycemia, improved from yesterday  Insulin glargine 25 units PO once daily before breakfast  Continue Insulin lispro 6 units SC TID AC   Insulin lispro sliding scale SC TID AC only       Cough  Continue Guaifenesin  mg PO q 12 hr     Constipation  Continue colace 100mg daily, pericolace 2 tabs daily after dinner       Gastroesophageal reflux disease  Continue Pantoprazole 40 mg PO once daily before breakfast     Anxiety  Continue Buspirone 5 mg PO TID    Satnam Kee MD  1/12/2020 13:36

## 2020-01-12 NOTE — PROGRESS NOTES
Problem: Mobility Impaired (Adult and Pediatric)  Goal: *Therapy Goal (Edit Goal, Insert Text)  Description  Physical Therapy Goals:  STG= LTG  Initiated 2019, revised 2020 and to be accomplished within 28 day(s) on 2020:   1. Patient will move from supine to sit and sit to supine with min A, and roll side to side in bed with contact guard assist.   2.  Patient will transfer from bed to chair and chair to bed with contact guard assist using lateral transfer board, equal heights; and min A with incline to higher surface (24\" height bed)  3. Patient will perform sit to stand with moderate assistance and least restrictive device. 4.  Patient will perform car transfer with lateral transfer board and min A.   5.  Patient will perform wheelchair mobility over even surfaces at mod independent level for at least 150 ft, including negotiation of doorways. 6.  Patient will perform wheelchair mobility up/down ramp, uneven sidewalk min A level. Outcome: Progressing Towards Goal   PHYSICAL THERAPY TREATMENT    Patient: Leonel Sinha (65 y.o. female)  Date: 2020  Diagnosis: Status post laminectomy with spinal fusion [Z98.1] Status post laminectomy with spinal fusion  Precautions: Fall, Spinal(thoracic)  Chart, physical therapy assessment, plan of care and goals were reviewed. Time In: 1040  Time Out: 1140  Patient Seen For: Wheelchair mobility;Transfer training; Therapeutic exercise(bed mobility)  Pain:  Pt pain was reported as  No c/o pre-treatment. Pt pain was reported as  No c/o post-treatment. Intervention: NA     Patient identified with name and : yes     SUBJECTIVE:     Pt drowsy throughout session and required cues to stay awake multiple times.      OBJECTIVE DATA SUMMARY:   Objective:     BED/MAT MOBILITY Daily Assessment    Rolling Right : 3 (Moderate assistance )  Rolling Left : 3 (Moderate assistance )  Supine to Sit : 3 (Moderate assistance)  Sit to Supine : 3 (Moderate assistance)   Pt performed bed mobility due to reports of having had a urine incontinence episode but once in process of changing pt's brief, pt was not wet. Pt performed rolling side to side with mod assist for BLE positioning. Pt performed sit to supine from left sidelying with mod assist with BLE. Pt required mod assist at trunk and with BLE for left sidelying to sitting. Pt required multiple v/c to prevent twisting thoracic trunk with reaching with UEs. TRANSFERS Daily Assessment    Transfer Type: Lateral with transfer board  Transfer Assistance : 3 (Moderate assistance )   Pt performed slide board txfr w/c<->bed with mod assist for anterior wt shift and to position BLE. BALANCE Daily Assessment    Sitting - Static: Fair (occasional)  Sitting - Dynamic: Fair (occasional)       WHEELCHAIR MOBILITY Daily Assessment    Able to Propel (ft): 220 feet  Functional Level: 5  Curbs/Ramps Assist Required (FIM Score): 0 (Not tested)  Wheelchair Setup Assist Required : 3 (Moderate assistance)  Wheelchair Management: Manages left brake;Manages right brake   Pt propelled w/c from dining room to gym with BLE, mod assist to manage leg rests. THERAPEUTIC EXERCISES Daily Assessment     Seated BLE heel slides x10 each with pillow case under feet. Pt required max cues to stay on task due to falling asleep. ASSESSMENT:  Pt continues to require mod assist for all mobility. Progression toward goals:  []      Improving appropriately and progressing toward goals  [x]      Improving slowly and progressing toward goals  []      Not making progress toward goals and plan of care will be adjusted     PLAN:  Patient continues to benefit from skilled intervention to address the above impairments. Continue treatment per established plan of care.   Discharge Recommendations:  Home Health  Further Equipment Recommendations for Discharge:  wheelchair 18 inch and slide board     Estimated LOS: 1/14/2020    Activity Tolerance:   fair  Please refer to the flowsheet for vital signs taken during this treatment. After treatment:   Patient left self propelling w/c back to room.        Tricia Delarosa, PTA  1/12/2020

## 2020-01-13 NOTE — PROGRESS NOTES
Problem: Self Care Deficits Care Plan (Adult)  Goal: *Therapy Goal (Edit Goal, Insert Text)  Description  Occupational Therapy Goals   Long Term Goals  Initiated 12/17/19 and to be accomplished within 4 week(s)  to facilitate increased self care independence and strength for return to PLOF and decreased care giver burden:   2. Pt will perform grooming with Mod I. - 1/13/20  3. Pt will perform UB bathing with SBA. 4. Pt will perform LB bathing with Tyree. 5. Pt will perform tub/shower transfer <> TTB with Min A.  6. Pt will perform UB dressing with Set-up. - 1/13/20  7. Pt will perform LB dressing with Min A.  8. Pt will perform toileting task with Min A. - 1/13/20  9. Pt will perform toilet transfer with Min A/CGA. Short Term Goals   Initiated 12/17/19 and to be accomplished within 7 day(s) (1/13/2020) to facilitate increased self care independence and strength for return to PLOF and decreased care giver burden:   1. Pt will perform self-feeding with Mod I.   2. Pt will perform grooming with set-up. ()  3. Pt will perform UB bathing with Mod A. ()  4. Pt will perform LB bathing with Mod A in LRE. ()  5. Pt will perform tub/shower transfer <> TTB with MaxA x 1.- 12/31  6. Pt will perform UB dressing with SBA. - 12/31  7. Pt will perform LB dressing with Mod A using AE as needed. ()  8. Pt will perform toileting task with Max A x 1. ()  9. Pt will perform toilet transfer with MaxA x 1.- 1/6/20 UG Mod assist         Outcome Measures:  (12/17/19) Dynamometer  strength: Right= 27.33# (norm=55. 1#) Left= 34.67# (norm=45.7#)  (12/24/19) 9-hole peg test: Right=45.46 seconds ; Left=41.13 seconds    D/C 1/14/20 Dynamometer  strength: Right= 40# (norm=55. 1#) Left= 39.5# (norm=45.7#)  D/C 1/14/20 9-hole peg test: Right=42.40 seconds ; Left= 1 minutes ,35 second         Outcome: Progressing Towards Goal  Goal: *Therapy Goal (Edit Goal, Insert Text)  Outcome: Progressing Towards Goal  Goal: Interventions  Outcome: Progressing Towards Goal  OCCUPATIONAL THERAPY DISCHARGE    Patient: Jef Sinha (65 y.o. female)  Date: 2020    First Tx Session  Time In: 1400  Time Out[de-identified] 887      Primary Diagnosis: Status post laminectomy with spinal fusion [Z98.1]   Status post laminectomy with spinal fusion    Precautions:   Fall, Spinal(thoracic)    Barriers to Learning/Limitations: {barriers to learning/limitations:  Compensate with: visual, verbal, tactile, kinesthetic cues/model     Patient identified with name and :yes    SUBJECTIVE:   Patient stated *My back hurt, I can't, my arms is weak    OBJECTIVE DATA SUMMARY:     Past Medical History:   Diagnosis Date    Abnormally low high density lipoprotein (HDL) cholesterol with hypertriglyceridemia     Lipid profile (2016) showed , , HDL 38, LDL 37    Acute blood loss as cause of postoperative anemia 2019    Anxiety     Bipolar affective disorder ([facility] [address] 2012    Cellulitis of right forearm 2017    Chronic back pain     Chronic obstructive pulmonary disease (COPD) (HCC)     SOB, on paula O2 Recent admission with psychosis     Chronic respiratory failure with hypoxia (HCC)     On home oxygen inhalation at 3 LPM via NC    Contusion of left elbow 2017    Depression     Diabetic neuropathy associated with type 2 diabetes mellitus (HCC)     Diastolic dysfunction without heart failure     Stable on diuretics     Falls frequently     Gastroesophageal reflux disease with hiatal hernia     Generalized osteoarthritis of multiple sites     Gout     On Allopurinol    History of acute renal failure 2013    History of back injury     jumped out of second story window     History of fracture due to fall 2019    History of hepatitis C     treated    History of penicillin allergy     History of vitamin D deficiency 5/10/2017    Hypertensive heart disease without heart failure     Better controlled     Hyperuricemia 2017    Hypothyroidism     Hypoxemia requiring supplemental oxygen 2014    Intravenous drug user 2017    Long term current use of aspirin     Memory difficulty     Menopause     Mixed connective tissue disease ([facility] [address] 5/10/2017    Obesity, Class I, BMI 30-34.9     Olecranon bursitis of right elbow 2017    Opioid dependence ([facility] [address]     On Methadone    Recurrent genital herpes 2013    Right Achilles tendinitis 2017    Sarcoidosis     Sickle cell trait ([facility]     Tobacco use disorder     Type 2 diabetes mellitus with diabetic neuropathy, with long-term current use of insulin ([facility])     HbA1c (2019) = 7.1    Urge urinary incontinence 5/10/2017    Venous insufficiency     Wears glasses      Past Surgical History:   Procedure Laterality Date    HX BACK SURGERY      HX  SECTION      HX CYST REMOVAL Left     Left foot    HX OTHER SURGICAL Left 2017    S/P incision and drainage of the abscess of the left hand and the left foot (2017 - Dr. Desean Paige. Dhaval Silva)    HX THORACIC LAMINECTOMY  2019    S/P T10, T9, T8 laminectomy; T7, T8, T9, T10, T11, T12 posterior thoracic fusion; segmental spinal instrumentation; K2M Davis type, T7-T8, T11-T12 (2019 - Dr. Jennifer Arora)    HX TUBAL LIGATION       Prior Level of Function/Home Situation: min assist  Home Situation  Home Environment: Private residence(condo)  # Steps to Enter: 5  Rails to Enter: Yes  Hand Rails : Bilateral  Wheelchair Ramp: No  One/Two Story Residence: One story  Living Alone: Yes  Support Systems: Family member(s), Friends \ neighbors(has a friend who stays the night; nursing aides)  Patient Expects to be Discharged to[de-identified] Private residence  Current DME Used/Available at Home: Dietra Hives, straight, Shower chair  Tub or Shower Type: Shower  [x]     Right hand dominant   []     Left hand dominant      Pain:  Pt reports 10/10 pain or discomfort prior to treatment.     Pt reports 10/10 pain or discomfort post treatment. Problem List:    Decreased strength B UE  [x]  B UE's    Decreased strength trunk/core  []     Decreased AROM   [x]  B UE's   Decreased PROM  []     Decreased balance sitting  []     Decreased balance standing  [x]     Decreased endurance  [x]     Pain  [x]       Functional Limitations:   Decreased independence with ADL  [x]     Decreased independence with functional transfers  [x]     Decreased independence with ambulation  [x]     Decreased independence with IADL  [x]       Outcome Measures: Pt to d/c home with Home Health     MMT Initial Assessment   Right Upper Extremity  Left Upper Extremity    UE AROM Decreased AROM (to 90 degrees flexion with pain in thoracic incision with AROM noted). MMT not formally assessed due to limited ROM and pain. Pt able to wash abdomen and reach laterally to retrieve items during ADL. Decreased AROM (to 90 degrees flexion with pain in thoracic incision with AROM noted). MMT not formally assessed due to limited ROM and pain. Pt able to wash abdomen and reach laterally to retrieve items during ADL.      Shoulder flexion       Shoulder extension       Shoulder ABDuction       Shoulder ADDUction       Elbow Flexion       Elbow Extension       Wrist Extension/Flexion        Decreased  noted, 27.33# Decreased  noted, 34.67#             MMT Discharge Assessment   Right Upper Extremity  Left Upper Extremity    UE AROM Decrease UE flexion  Decrease UE flexion    Shoulder flexion     Shoulder extension 3-/5 3-/5   Shoulder ABDuction - -   Shoulder ADDUction - -   Elbow Flexion - -   Elbow Extension - -   Wrist Extension/Flexion - -    3+/5 3-/5       0/5 No palpable muscle contraction  1/5 Palpable muscle contraction, no joint movement  2-/5 Less than full range of motion in gravity eliminated position  2/5 Able to complete full range of motion in gravity eliminated position  2+/5 Able to initiate movement against gravity  3-/5 More than half but not full range of motion against gravity  3/5 Able to complete full range of motion against gravity  3+/5 Completes full range of motion against gravity with minimal resistance  4-/5 Completes full range of motion against gravity with minimal resistance  4/5 Completes full range of motion against gravity with moderate resistance  5/5 Completes full range of motion against gravity with maximum resistance    Coordination: UE's Intact  Sensation: UE's Intact    FIM SCORES Initial Assessment Discharge Assessment   Eating 5   5 -set-up:  Pt set-up, than self feed with increase time. Grooming 4 Grooming  Grooming Assistance : 6 (Modified independent)  Comments: Grooming from wheel chair level    Oral Hygiene FIM: 6 mod independent    Bathing 2 Upper Body Bathing  Bathing Assistance, Upper: 5 (Supervision)  Position Performed: Long sitting on bed  Comments: Pt performed UB bathing with set-up      Lower Body Bathing  Bathing Assistance, Lower : 3 (Moderate assistance )  Position Performed: Long sitting on bed  Comments: Pt washed tyrell area in long sitting with set -up. Rolled to have buttocks washed dependent     Upper Body Dressing 3 Upper Body Dressing   Dressing Assistance : 4 (Minimal assistance)  Comments: Pt shante/doff blouse in long siting. Lower Body Dressing 1 Lower Body Dressing   Dressing Assistance : 2 (Maximal assistance)  Leg Crossed Method Used: No Position Performed: Long sitting on bed  Adaptive Equipment Used: Long handled shoe horn;Reacher  Don/Doff Anti-Embolic Stockings: 1 (Total assistance)  Comments: Donned pants over pt's feet 2/2 snug pants legs. Pt provided min assist with bridging donning pants over hips, than rolled to have pants donned over buttocks dependent. Mod assist donning shoe with LH shoe horn.  And dependent donning  manuela stocking     Sock and/or Shoe Management FIM: 3 mod assist   Toileting 1 Toileting  Toileting Assistance (FIM Score): 1 (Total assistance)  Cues: Physical assistance for pants down;Physical assistance for pants up and total assist with hygiene 2/2 pt c/o of back hurting unable to perform tasks. Adaptive Equipment: (Drop arm BSC)   Tub/Shower Transfer 0   Functional Transfers  Tub or Shower Type: Shower  Amount of Assistance Required: 3 (Moderate assistance advancing LE's and cuing for proper hand placement)  Adaptive Equipment: Sliding board; Tub transfer bench   Toilet Transfer 0 Functional Transfers  Toilet Transfer : Lateral with transfer board   Amount of Assistance Required: 4 (Minimal assistance with verbal cues for foot placement and    Comprehension 6 6   Expression 6 6   Social Interaction 5 7   Problem Solving 4 4   Memory 5 5   Please see C Interdisciplinary Eval: Coordination/Balance Section for details regarding FIM score description. ASSESSMENT: Pt made progress with self care however, pt has the potential to improve with self care with hard work and focus on tasks. Progression toward goals:meeting 3/9 LTG's  [x]      Improving appropriately and progressing toward goals  []      Improving slowly and progressing toward goals  []      Not making progress toward goals and plan of care will be adjusted     PLAN:  Pt would benefit from continued skilled occupational therapy in order to improve ADL function and IADL with use of least restrictive device. Interventions may include range of motion (AROM, PROM B UE/trunk), motor function (B UE/trunk strengthening/coordination), activity tolerance (vitals, oxygen saturation levels), ADL, balance activities, IADL, and functional transfer training. Discharge Recommendations: Home Health  Further Equipment Recommendations for Discharge: 3:1 Drop arm commode       Please refer to the flow sheet for vital signs taken during this treatment.   After treatment:   [x]  Patient left in no apparent distress sitting up in chair  []  Patient left in no apparent distress in bed  [x]  Call bell left within reach  []  Nursing notified  []  Caregiver present  []  Bed alarm activated    COMMUNICATION/EDUCATION:   Communication/Collaboration:  [x]      Home safety education was provided and the patient/caregiver indicated understanding. []      Patient/family have participated as able and agree with findings and recommendations. []      Patient is unable to participate in plan of care at this time.     Anay FORREST  1/13/2020

## 2020-01-13 NOTE — PROGRESS NOTES
NUTRITION    Nutrition Consult: General Nutrition Management & Supplements     RECOMMENDATIONS / PLAN:     - Continue current nutrition interventions  - Continue RD inpatient monitoring and evaluation. NUTRITION INTERVENTIONS & DIAGNOSIS:     - Meals/snacks: modified composition     Nutrition Diagnosis: No nutrition diagnosis at this time. ASSESSMENT:     1/13: Pt with therapy at time of visit. Per nursing, has excellent appetite and meal intake. Tolerating diet. BG levels not controlled     1/8: Pt unavailable at time of visit. Had reported good appetite and meal intake during previous RD visit. Will continue to monitor   1/2: Pt reported good appetite. Was consuming Glucerna Shake supplement because she is still hungry after meals sometimes; unsure of how pt obtained it. Explained to pt that current diet meets estimated energy needs. Pt still asking for additional portions; pt agreeable to receiving additional portion of vegetables BID. Noted significant change in weight since admission; questions accuracy of wt trends  1/1: Good meal intake and appetite. Per nursing multiple supplements remain in refrigerator for pt and pt agreeable to discontinue supplement order. Tolerating diet. Noted episode of hypoglycemia 12/31 afternoon, pt reports good meal intake yesterday. 12/26: Pt unavailable at time of visit. Has good/excellent meal intake per chart. Tolerating diet. Consuming 100% of ensure pudding per nursing documentation  12/18: Pt reported good appetite, fair meal intake due to poor dentition, missing teeth. Food preferences discussed. Agreed with plan to downgrade diet consistency. Not consuming Glucerna Shake; discussed other supplement options. 12/17: S/p laminectomy with spinal fusion on 12/11 and transferred to ARU. Reports fair intake, consuming around 50% of meals, during recent hospitalization.  Noted significant weight loss however pt reports this was intentional. States she lost the weight \"to help control her diabetes. \" Fair intake since admission & tolerating diet. BG levels in good control. Nutritional intake adequate to meet patients estimated nutritional needs:  Yes    Diet: DIET CARDIAC Regular; Consistent Carb 1800kcal; No Conc. Sweets, [place]      Food Allergies: NKFA  Current Appetite: Good  Appetite/meal intake prior to admission: Fair 50% of meals provided  Feeding Limitations:  [] Swallowing difficulty    [] Chewing difficulty    [] Other:  Current Meal Intake:   Patient Vitals for the past 100 hrs:   % Diet Eaten   01/10/20 0910 100 %       BM: 1/12  Skin Integrity: wound & incision to lower back  Edema:   [] No     [x] Yes   Pertinent Medications: Reviewed: vitamin C, bowel regimen, tums, calcium citrate, vitamin D3, lantus, SSI, protonix    Recent Labs     01/13/20  0615      K 4.1      CO2 28   *   BUN 22*   CREA 1.20   CA 8.6     No intake or output data in the 24 hours ending 01/13/20 1528    Anthropometrics:  Ht Readings from Last 1 Encounters:   01/13/20 5' 5\" (1.651 m)     Last 3 Recorded Weights in this Encounter    12/21/19 1536 01/02/20 2107   Weight: 42.9 kg (94 lb 8 oz) 78.2 kg (172 lb 6.4 oz)     Body mass index is 28.69 kg/m². BMI 29.68 kg/(m^2) using previous wt from 12/14/19    Weight History: Pt reports intended weight loss with a previous wt of 230# x a few years ago. Weight Metrics 1/2/2020 12/16/2019 12/14/2019 12/10/2019 12/7/2019 11/29/2019 11/12/2019   Weight 172 lb 6.4 oz - 178 lb 9.6 oz - 180 lb 180 lb 180 lb 6.4 oz   BMI - 28.69 kg/m2 - 29.72 kg/m2 29.95 kg/m2 29.95 kg/m2 30.02 kg/m2   Some encounter information is confidential and restricted. Go to Review Flowsheets activity to see all data.         Admitting Diagnosis: Status post laminectomy with spinal fusion [Z98.1]  Pertinent PMHx: COPD, CKD stage 3, DM with neuropathy, GERD, hepatitis C, IV drug user, sarcoidosis    Education Needs:        [x] None identified  [] Identified - Not appropriate at this time  []  Identified and addressed - refer to education log  Learning Limitations:   [x] None identified  [] Identified    Cultural, Christianity & ethnic food preferences:  [x] None identified    [] Identified and addressed     ESTIMATED NUTRITION NEEDS:     Calories: 2007-6070 kcal (MSJx1.2-1.3) based on  [x] Actual BW: 78 kg      [] IBW   Protein: 62-78 gm (0.8-1 gm/kg) based on  [x] Actual BW      [] IBW   Fluid: 1 mL/kcal     MONITORING & EVALUATION:     Nutrition Goal(s):   - PO nutrition intake will continue to meet >75% of patients estimated nutritional needs over the next 7 days. Outcome: Met/Continue    Monitoring:   [x] Food and nutrient intake   [x] Food and nutrient administration  [x] Comparative standards   [x] Nutrition-focused physical findings   [x] Anthropometric Measurements   [x] Treatment/therapy   [x] Biochemical data, medical tests, and procedures        Previous Recommendations (for follow-up assessments only):  No Recommendation Made      Discharge Planning: Nutritional discharge needs unknown at this time. Participated in care planning, discharge planning, & interdisciplinary rounds as appropriate.       Jc Arce RD  Pager: [phone]

## 2020-01-13 NOTE — PROGRESS NOTES
Pt BS this afternoon 267. CNA found pt with regular root beer and cookies while at the table prior to obtaining BS. Will discuss with patient again importance of diet compliance for diabetic diet.

## 2020-01-13 NOTE — PROGRESS NOTES
SHIFT CHANGE NOTE FOR [facility]    Bedside and Verbal shift change report given to Susan Kinney RN (oncoming nurse) by Carole Ugarte (offgoing nurse). Report included the following information SBAR, Kardex, MAR and Recent Results.     Situation:   Code Status: Full Code   Reason for Admission: Spinal Thoracic laminectomy T 6-T [address] Day: 27   Problem List:   Hospital Problems  Date Reviewed: 1/10/2020          Codes Class Noted POA    Chronic respiratory failure with hypoxia (HCC) (Chronic) ICD-10-CM: J96.11  ICD-9-CM: 518.83, 799.02  Unknown Yes    Overview Signed 12/16/2019 10:11 PM by Dee Pang MD     On home oxygen inhalation at 3 LPM via NC             Opioid dependence ([facility] (Chronic) ICD-10-CM: F11.20  ICD-9-CM: 304.00  Unknown Yes    Overview Signed 12/16/2019 10:54 PM by Dee Pang MD     On Methadone             Acute blood loss as cause of postoperative anemia ICD-10-CM: D62  ICD-9-CM: 285.1  12/12/2019 Yes        Impaired mobility and ADLs ICD-10-CM: Z74.09  ICD-9-CM: 799.89  12/11/2019 Yes        Spinal cord compression ([facility] ICD-10-CM: G95.20  ICD-9-CM: 336.9  12/11/2019 Yes        Compression fracture of body of thoracic vertebra (HCC) ICD-10-CM: S22.000A  ICD-9-CM: 805.2  12/11/2019 Yes        * (Principal) Status post laminectomy with spinal fusion ICD-10-CM: Z98.1  ICD-9-CM: V45.4  12/11/2019 Yes    Overview Signed 12/16/2019 10:05 PM by Dee aPng MD     S/P T10, T9, T8 laminectomy; T7, T8, T9, T10, T11, T12 posterior thoracic fusion; segmental spinal instrumentation; K2M [place] type, T7-T8, T11-T12 (12/11/2019 - Dr. Rigoberto Morales)             History of fracture due to fall ICD-10-CM: Z87.81  ICD-9-CM: V15.51  12/11/2019 Yes        Hypoxemia requiring supplemental oxygen (Chronic) ICD-10-CM: R09.02, Z99.81  ICD-9-CM: 799.02  12/29/2014 Yes        Chronic obstructive pulmonary disease (COPD) (HCC) (Chronic) ICD-10-CM: J44.9  ICD-9-CM: 496  Unknown Yes    Overview Signed 4/23/2013 10:01 AM by Arlyn Chambers     SOB, on paula O2  Recent admission with psychosis             Hypertensive heart disease without heart failure (Chronic) ICD-10-CM: I11.9  ICD-9-CM: 402.90  Unknown Yes    Overview Signed 4/23/2013 10:02 AM by Gretchen CALLES     Better controlled             Type 2 diabetes mellitus with diabetic neuropathy, with long-term current use of insulin ([facility] (Chronic) ICD-10-CM: E11.40, Z79.4  ICD-9-CM: 250.60, 357.2, V58.67  Unknown Yes    Overview Signed 12/18/2019  2:13 PM by Elby Rinne, MD     HbA1c (12/11/2019) = 7.1                   Background:   Past Medical History:   Past Medical History:   Diagnosis Date    Abnormally low high density lipoprotein (HDL) cholesterol with hypertriglyceridemia     Lipid profile (11/6/2016) showed , , HDL 38, LDL 37    Acute blood loss as cause of postoperative anemia 12/12/2019    Anxiety     Bipolar affective disorder ([facility] 12/5/2012    Cellulitis of right forearm 05/04/2017    Chronic back pain     Chronic obstructive pulmonary disease (COPD) (HCC)     SOB, on paula O2 Recent admission with psychosis     Chronic respiratory failure with hypoxia (HCC)     On home oxygen inhalation at 3 LPM via NC    Contusion of left elbow 5/4/2017    Depression     Diabetic neuropathy associated with type 2 diabetes mellitus (HCC)     Diastolic dysfunction without heart failure     Stable on diuretics     Falls frequently     Gastroesophageal reflux disease with hiatal hernia     Generalized osteoarthritis of multiple sites     Gout     On Allopurinol    History of acute renal failure 5/31/2013    History of back injury     jumped out of second story window     History of fracture due to fall 12/11/2019    History of hepatitis C     treated    History of penicillin allergy     History of vitamin D deficiency 5/10/2017    Hypertensive heart disease without heart failure     Better controlled     Hyperuricemia 5/26/2017    Hypothyroidism     Hypoxemia requiring supplemental oxygen 12/29/2014    Intravenous drug user 5/2/2017    Long term current use of aspirin     Memory difficulty     Menopause     Mixed connective tissue disease ([facility] 5/10/2017    Obesity, Class I, BMI 30-34.9     Olecranon bursitis of right elbow 5/4/2017    Opio[facility] dependence ([facility]     On Methadone    Recurrent genital herpes 5/31/2013    Right Achilles tendinitis 5/2/2017    Sarcoidosis     Sickle cell trait ([facility]     Tobacco use disorder     Type 2 diabetes mellitus with diabetic neuropathy, with long-term current use of insulin ([facility])     HbA1c (12/11/2019) = 7.1    Urge urinary incontinence 5/10/2017    Venous insufficiency     Wears glasses       Patient taking anticoagulants no    Patient has a defibrillator: no     Assessment:   Changes in Assessment throughout shift: None     Patient has central line: no Reasons if yes: Removed 12/16/19  Insertion date:n/a Last dressing date:n/a   Patient has Renae Cath: no Reasons if yes: n/a   Insertion date:n/a     Last Vitals:     Vitals:    01/11/20 1554 01/11/20 2148 01/12/20 0734 01/12/20 1629   BP: 112/66 121/68 132/73 130/72   Pulse: 70 73 73 83   Resp: 18 16 18 20   Temp: 97.6 °F (36.4 °C) 98.2 °F (36.8 °C) 97.9 °F (36.6 °C) 98.5 °F (36.9 °C)   SpO2: 95% 97% 97% 95%   Weight:       LMP: 11/25/2012        PAIN    Pain Assessment    Pain Intensity 1: 0 (01/12/20 1632) Pain Intensity 1: 2 (12/29/14 1105)    Pain Location 1: Perineum Pain Location 1: Abdomen    Pain Intervention(s) 1: Medication (see MAR) Pain Intervention(s) 1: Medication (see MAR)  Patient Stated Pain Goal: 0 Patient Stated Pain Goal: 0  o Intervention effective: yes    o Other actions taken for pain: no c/o     Skin Assessment  Skin color Skin Color: Appropriate for ethnicity  Condition/Temperature Skin Condition/Temp: Warm  Integrity Skin Integrity: Incision (comment)  Turgor Turgor: Non-tenting  Weekly Pressure Ulcer Documentation  Pressure  Injury Documentation: No Pressure Injury Noted-Pressure Ulcer Prevention Initiated  Wound Prevention & Protection Methods  Orientation of wound Orientation of Wound Prevention: Posterior  Location of Prevention Location of Wound Prevention: Sacrum/Coccyx  Dressing Present Dressing Present : No  Dressing Status Dressing Status: Intact  Wound Offloading Wound Offloading (Prevention Methods): Bed, pressure redistribution/air, Bed, pressure reduction mattress     INTAKE/OUPUT  Date 01/11/20 1900 - 01/12/20 0659 01/12/20 0700 - 01/13/20 0659   Shift 8666-2106 24 Hour Total 4888-2682 4019-7544 24 Hour Total   INTAKE   Shift Total(mL/kg)        OUTPUT   Urine(mL/kg/hr)          Urine Occurrence(s) 4 x 6 x 2 x  2 x   Stool          Stool Occurrence(s) 1 x 2 x 0 x  0 x   Shift Total(mL/kg)        NET        Weight (kg) 78.2 78.2 78.2 78.2 78.2       Recommendations:  1. Patient needs and requests: met    2. Diet: Cardiac DM Reg /thin liq    3. Pending tests/procedures: none     4. Functional Level/Equipment: wheelchair    5. Estimated Discharge Date: TBD Posted on Whiteboard in Patients Room: yes     HEALS Safety Check    A safety check occurred in the patient's room between off going nurse and oncoming nurse listed above.     The safety check included the below items  Area Items   H  High Alert Medications - Verify all high alert medication drips (heparin, PCA, etc.)   E  Equipment - Suction is set up for ALL patients (with yanker)  - Red plugs utilized for all equipment (IV pumps, etc.)  - WOWs wiped down at end of shift.  - Room stocked with oxygen, suction, and other unit-specific supplies   A  Alarms - Bed alarm is set for fall risk patients  - Ensure chair alarm is in place and activated if patient is up in a chair   L  Lines - Check IV for any infiltration  - Renae bag is empty if patient has a Renae   - Tubing and IV bags are labeled   S  Safety   - Room is clean, patient is clean, and equipment is clean. - Hallways are clear from equipment besides carts. - Fall bracelet on for fall risk patients  - Ensure room is clear and free of clutter  - Suction is set up for ALL patients (with lashay)  - Hallways are clear from equipment besides carts.    - Isolation precautions followed, supplies available outside room, sign posted

## 2020-01-13 NOTE — PROGRESS NOTES
[address]  [address] [place] [address], [address]     INPATIENT REHABILITATION  DAILY PROGRESS NOTE     Date: 1/13/2020    Name: Beatriz Hdz Age / Sex: 62 y.o. / female   CSN: [id number] MRN: [id number]   [facility] Date: 12/16/2019 Length of Stay: 28 days     Primary Rehab Diagnosis: Impaired Mobility and ADLs secondary to:  1. S/P T10, T9, T8 laminectomy; T7, T8, T9, T10, T11, T12 posterior thoracic fusion; segmental spinal instrumentation; K2M [place] type, T7-T8, T11-T12 (12/11/2019 - Dr. Ros Pathak)  2. History of acute compression fractures of body of thoracic vertebrae (T9 and T10) due to fall      Subjective:     Patient seen and examined. Blood pressure controlled. Blood glucose controlled. Patient's Complaint:   No significant medical complaints    Pain Control: stable, mild-to-moderate joint symptoms intermittently, reasonably well controlled by current meds      Objective:     Vital Signs:  Patient Vitals for the past 24 hrs:   BP Temp Pulse Resp SpO2 Height   01/13/20 1216      5' 5\" (1.651 m)   01/13/20 0716 119/69 97 °F (36.1 °C) 76 16 97 %    01/12/20 2151 125/70 98.4 °F (36.9 °C) 71 17 96 %    01/12/20 1629 130/72 98.5 °F (36.9 °C) 83 20 95 %         Physical Examination:  GENERAL SURVEY: Patient is awake, alert, oriented x 3, sitting comfortably on the chair, not in acute respiratory distress.   HEENT: pale palpebral conjunctivae, anicteric sclerae, no nasoaural discharge, moist oral mucosa  NECK: supple, no jugular venous distention, no palpable lymph nodes  CHEST/LUNGS: symmetrical chest expansion, good air entry, clear breath sounds  HEART: adynamic precordium, good S1 S2, no S3, regular rhythm, no murmurs  ABDOMEN: obese, bowel sounds appreciated, soft, non-tender  EXTREMITIES: pale nailbeds, no edema, full and equal pulses, no calf tenderness   NEUROLOGICAL EXAM: The patient is awake, alert and oriented x3, able to answer questions fairly appropriately, able to follow 1 and 2 step commands. Able to tell time from the wall clock. Cranial nerves II-XII are grossly intact. No gross sensory deficit. Motor strength is 4-/5 on BUE, 2+/5 on the RLE (except for 1/5 on the right ankle), 2-/5 on the LLE (except for 2+/5 on the left knee).      Incision(s): healing well, clean, dry, and intact      Current Medications:  Current Facility-Administered Medications   Medication Dose Route Frequency    insulin lispro (HUMALOG) injection 6 Units  6 Units SubCUTAneous TIDAC    busPIRone (BUSPAR) tablet 5 mg  5 mg Oral TID    pantoprazole (PROTONIX) tablet 40 mg  40 mg Oral ACB    calcium carbonate (TUMS) chewable tablet 200 mg [elemental]  200 mg Oral TID PRN    insulin glargine (LANTUS) injection 25 Units  25 Units SubCUTAneous ACB    guaiFENesin ER (MUCINEX) tablet 600 mg  600 mg Oral Q12H    acetaminophen (TYLENOL) tablet 650 mg  650 mg Oral Q4H PRN    bisacodyl (DULCOLAX) tablet 10 mg  10 mg Oral Q48H PRN    ferrous sulfate tablet 325 mg  1 Tab Oral DAILY WITH BREAKFAST    ascorbic acid (vitamin C) (VITAMIN C) tablet 250 mg  250 mg Oral DAILY WITH BREAKFAST    insulin lispro (HUMALOG) injection   SubCUTAneous TIDAC    senna-docusate (PERICOLACE) 8.6-50 mg per tablet 2 Tab  2 Tab Oral PCD    methadone (DOLOPHINE) tablet 20 mg  20 mg Oral DAILY    oxyCODONE-acetaminophen (PERCOCET 10)  mg per tablet 1 Tab  1 Tab Oral Q4H PRN    predniSONE (DELTASONE) tablet 30 mg  30 mg Oral DAILY WITH BREAKFAST    rosuvastatin (CRESTOR) tablet 5 mg  5 mg Oral QHS    albuterol (PROVENTIL VENTOLIN) nebulizer solution 2.5 mg  2.5 mg Nebulization Q4H PRN    levothyroxine (SYNTHROID) tablet 75 mcg  75 mcg Oral 6am    cholecalciferol (VITAMIN D3) (400 Units /10 mcg) tablet 5 Tab  2,000 Units Oral DAILY    calcium citrate tablet 200 mg [elemental]  200 mg Oral BID    divalproex DR (DEPAKOTE) tablet 500 mg  500 mg Oral QHS    fluticasone-vilanterol (BREO ELLIPTA) 100mcg-25mcg/puff  1 Puff Inhalation DAILY    oxybutynin (DITROPAN) tablet 5 mg  5 mg Oral BID    allopurinol (ZYLOPRIM) tablet 100 mg  100 mg Oral DAILY    sertraline (ZOLOFT) tablet 50 mg  50 mg Oral DAILY    methocarbamol (ROBAXIN) tablet 500 mg  500 mg Oral Q8H    aspirin chewable tablet 81 mg  81 mg Oral DAILY WITH BREAKFAST    docusate sodium (COLACE) capsule 100 mg  100 mg Oral DAILY AFTER BREAKFAST       Allergies:   Allergies   Allergen Reactions    Moxifloxacin Rash    Pcn [Penicillins] Swelling    Sulfa (Sulfonamide Antibiotics) Swelling       Lab/Data Review:  Recent Results (from the past 24 hour(s))   GLUCOSE, POC    Collection Time: 01/12/20  4:43 PM   Result Value Ref Range    Glucose (POC) 199 (H) 70 - 110 mg/dL   GLUCOSE, POC    Collection Time: 01/12/20  9:51 PM   Result Value Ref Range    Glucose (POC) 97 70 - 892 mg/dL   METABOLIC PANEL, BASIC    Collection Time: 01/13/20  6:15 AM   Result Value Ref Range    Sodium 141 136 - 145 mmol/L    Potassium 4.1 3.5 - 5.5 mmol/L    Chloride 105 100 - 111 mmol/L    CO2 28 21 - 32 mmol/L    Anion gap 8 3.0 - 18 mmol/L    Glucose 141 (H) 74 - 99 mg/dL    BUN 22 (H) 7.0 - 18 MG/DL    Creatinine 1.20 0.6 - 1.3 MG/DL    BUN/Creatinine ratio 18 12 - 20      GFR est AA 55 (L) >60 ml/min/1.73m2    GFR est non-AA 45 (L) >60 ml/min/1.73m2    Calcium 8.6 8.5 - 10.1 MG/DL   HGB & HCT    Collection Time: 01/13/20  6:15 AM   Result Value Ref Range    HGB 11.3 (L) 12.0 - 16.0 g/dL    HCT 35.7 35.0 - 45.0 %   GLUCOSE, POC    Collection Time: 01/13/20  7:09 AM   Result Value Ref Range    Glucose (POC) 163 (H) 70 - 110 mg/dL   GLUCOSE, POC    Collection Time: 01/13/20 11:25 AM   Result Value Ref Range    Glucose (POC) 102 70 - 110 mg/dL       Estimated Glomerular Filtration Rate:  On admission, estimated GFR based on a Creatinine of 0.68 mg/dl:              Using CKD-EPI = 107.9 mL/min/1.73m2              Using MDRD = 112.4 mL/min/1.73m2  Most recent estimated GFR, based on a Creatinine of 1.20 mg/dl on 1/13/2020:   Using CKD-EPI = 55.7 mL/min/1.73m2   Using MDRD = 58.4 mL/min/1.73m2       Assessment:     Primary Rehabilitation Diagnosis  1. Impaired Mobility and ADLs  2. S/P T10, T9, T8 laminectomy; T7, T8, T9, T10, T11, T12 posterior thoracic fusion; segmental spinal instrumentation; K2M [place] type, T7-T8, T11-T12 (12/11/2019 - Dr. Sharath Gilbert)  3. History of acute compression fractures of body of thoracic vertebrae (T9 and T10) due to fall     Comorbidities   Diabetic neuropathy associated with type 2 diabetes mellitus (HCC) E11.40    Venous insufficiency I87.2    Obesity, Class I, BMI 30-34.9 E66.9    Chronic back pain M54.9, G89.29    Generalized osteoarthritis of multiple sites M15.9    Bipolar affective disorder  F31.9    Abnormally low high density lipoprotein (HDL) cholesterol with hypertriglyceridemia E78.6, C38.1    Diastolic dysfunction without heart failure I51.89    Chronic obstructive pulmonary disease (COPD)  J44.9    Hypertensive heart disease without heart failure I11.9    Depression F32.9    History of back injury Z87.828    Type 2 diabetes mellitus with diabetic neuropathy, with long-term current use of insulin  E11.40, Z79.4    Anxiety F41.9    Wears glasses Z97.3    History of hepatitis C Z86.19    Sickle cell trait D57.3    History of acute renal failure Z87.448    Hypothyroidism E03.9    Recurrent genital herpes A60.00    Sarcoidosis D86.9    Abscess of right arm L02.413    Hypoxemia requiring supplemental oxygen R09.02, Z99.81    Abnormal nuclear stress test R94.39    Cellulitis and abscess of hand L03.119, L02.519    Cellulitis and abscess of foot L03.119, L02.619    Cellulitis of arm, right L03. 113    Olecranon bursitis of right elbow M70.21    Contusion of left elbow S50. 02XA    Intravenous drug user F19.90    Right Achilles tendinitis M76.61    History of penicillin allergy Z88.0    Falls frequently R29.6    Gastroesophageal reflux disease with hiatal hernia K21.9, K44.9    Memory difficulty R41.3    Mixed connective tissue disease  M35.1    Hyperuricemia E79.0    History of vitamin D deficiency Z86.39    Urge urinary incontinence N39.41    Acute blood loss as cause of postoperative anemia D62    History of fracture due to fall Z87.81    Chronic respiratory failure with hypoxia  J96.11    Cellulitis of right forearm L03. 113    Gout M10.9    Menopause Z78.0    Long term current use of aspirin Z79.82    Opioid dependence  F11.20    Tobacco use disorder F17.200        Plan:     1. Justification for continued stay: Good progression towards established rehabilitation goals. 2. Medical Issues being followed closely:    [x]  Fall and safety precautions     [x]  Wound Care     [x]  Bowel and Bladder Function     [x]  Fluid Electrolyte and Nutrition Balance     [x]  Pain Control      3. Issues that 24 hour rehabilitation nursing is following:    [x]  Fall and safety precautions     [x]  Wound Care     [x]  Bowel and Bladder Function     [x]  Fluid Electrolyte and Nutrition Balance     [x]  Pain Control      [x]  Assistance with and education on in-room safety with transfers to and from the bed, wheelchair, toilet and shower. 4. Acute rehabilitation plan of care:    [x]  Continue current care and rehab. [x]  Physical Therapy           [x]  Occupational Therapy           []  Speech Therapy     []  Hold Rehab until further notice     5. Medications:    [x]  MAR Reviewed     [x]  Continue Present Medications     6. DVT Prophylaxis:      []  Lovenox     []  Unfractionated Heparin     []  Coumadin     []  NOAC     [x]  ROBERT Stockings     []  Sequential Compression Device     []  None     7. Orders:   > S/P T10, T9, T8 laminectomy; T7, T8, T9, T10, T11, T12 posterior thoracic fusion; segmental spinal instrumentation; K2M [place] type, T7-T8, T11-T12 (12/11/2019 - Dr. Nuris Romero);  History of acute compression fractures of body of thoracic vertebrae (T9 and T10) due to fall   > Thoracic spinal precautions   > Continue:    > Calcium citrate 200 mg PO BID    > Cholecalciferol 2,000 units PO once daily    > Acute Postoperative Blood Loss Anemia   > Anemia work-up (12/13/2019) showed serum iron 11, TIBC 215, iron % saturation 5, ferritin 146   > Hgb/Hct (12/17/2019, on admission) = 7.4/23.1   > Reticulocyte count (12/17/2019) = 2.5   > Hgb/Hct (12/23/2019) = 8.5/26.7    > Hgb/Hct (12/30/2019) = 9.8/31.4    > Hgb/Hct (1/4/2020) = 10.9/35.5   > Hgb/Hct (1/6/2020) = 10.7/34.1    > Hgb/Hct (1/13/2020) = 11.335.7    > Continue:    > FeSO4 325 mg PO once daily with breakfast     > Ascorbic Acid 250 mg PO once daily with breakfast (to enhance the absorption of the FeSO4)     > Benign hypertensive heart and kidney disease with stage 3 chronic kidney disease without congestive heart failure   > Prior to admission, the patient stated she was on Metolazone 2.5 mg PO q Mon-Wed-Fri   > Metolazone was not given during the patient's hospital stay at [facility]    > Upon admission to the ARU, held Metolazone     > Chronic obstructive pulmonary disease; Chronic respiratory failure with hypoxemia requiring supplemental oxygen (3 LPM)   > Continue:    > Fluticasone-Vilanterol 100-25 1 puff inhaled once daily    > O2 inhalation at 3 LPM via nasal cannula continuous    > Opioid dependence   > Continue Methadone 20 mg PO once daily    > Type 2 diabetes mellitus with stage 3 chronic kidney disease   > HbA1c (12/11/2019) = 7.1   > On 12/22/2019:    > Increased Insulin glargine from 20 units to 22 units PO once daily before breakfast    > Started Insulin lispro 3 units SC TID AC    > On 12/24/2019, increased Insulin lispro from 3 units to 4 units SC TID AC    > On 12/25/2019, increased Insulin glargine from 22 units to 25 units PO once daily before breakfast   > Continue:    > Insulin glargine 25 units PO once daily before breakfast    > Insulin lispro 4 units SC TID AC     > Insulin lispro sliding scale SC TID AC only    > Cough   > On 12/18/2019, started Guaifenesin  mg PO q 12 hr   > Continue Guaifenesin  mg PO q 12 hr    > Constipation   > On 12/17/2019, started:    > Docusate sodium 100 mg PO once daily after breakfast    > PeriColace 2 tabs PO once daily after dinner   > On 12/22/2019, started Lubiprostone 24 mcg PO once daily with breakfast   > On 1/9/2020, discontinued Lubiprostone 24 mcg PO once daily with breakfast   > Continue:    > Docusate sodium 100 mg PO once daily after breakfast    > PeriColace 2 tabs PO once daily after dinner    > Gastroesophageal reflux disease   > On 1/2/2019, Famotidine 20 mg PO BID was changed to Pantoprazole 40 mg PO once daily before breakfast   > Continue Pantoprazole 40 mg PO once daily before breakfast    > Anxiety   > On 1/1/2020, started Buspirone 5 mg PO TID   > Continue Buspirone 5 mg PO TID    > Analgesia   > Continue:    > Acetaminophen 650 mg PO q 4 hr PRN for pain level less than 5/10    > Methocarbamol 500 mg PO q 8 hr    > Percocet 10/325 1 tab PO q 4 hr PRN for pain level greater than 4/10     > Diet:   > Specifications: Cardiac, diabetic consistent carb 1800 kcal, no concentrated sweets, low purine   > Solids (consistency): Regular    > Liquids (consistency): Thin       8. Patient's progress in rehabilitation and medical issues discussed with the patient. All questions answered to the best of my ability. Care plan discussed with patient and nurse.     9. Discharge Planning:  Discharge date  1/14/2020 (Tuesday)   Discharge location  [x] Home     [] [address]    [] Other:    Follow-up services  [] Outpatient      [x] Home Health       [x] Physical Therapy              [x] Occupational Therapy       [] Speech Therapy                [] Medical Social Worker    [] Aide   [x] Skilled Nursing           [x] Medication reconciliation        [x] Disease education        [] PT/INR monitoring        [] Post-CABG care/monitoring        [] Colostomy/Ileostomy care        [] Routine PICC line care        [] IV antibiotic administration            Antibiotic:             Stop date:        [] Tube feeding        [] Indwelling rousseau catheter care        [] Wound care            Instructions:___________________________   Follow-up appointments  1. PCP Narendra Clarke NP / Peg Fisher MD)   2. Spine Surgery (Dr. Amparo Dumont)   3.  Pulmonary Medicine (Dr. Addi Marrero)        Signed:    Coco Navas MD    January 13, 2020

## 2020-01-13 NOTE — PROGRESS NOTES
conducted a Follow up consultation and Spiritual Assessment for Miesha Bush, who is a 62 y.o.,female. The  provided the following Interventions:  Continued the relationship of care and support. Listened empathically. Offered assurance of continued prayer on patients behalf. Chart reviewed. The following outcomes were achieved:  Patient expressed gratitude for 's visit. Assessment:  There are no further spiritual or Christian issues which require Spiritual Care Services interventions at this time. Plan:  Chaplains will continue to follow and will provide pastoral care on an as needed/requested basis.  recommends bedside caregivers page  on duty if patient shows signs of acute spiritual or emotional distress. Chaplain Thomas RICHARDS  [address] [facility]   [phone]

## 2020-01-13 NOTE — PROGRESS NOTES
SHIFT CHANGE NOTE FOR [facility]    Bedside and Verbal shift change report given to Rosibel Allison RN (oncoming nurse) by Jeanette Mohr RN (offgoing nurse). Report included the following information SBAR, Kardex, MAR and Recent Results.     Situation:   Code Status: Full Code   Reason for Admission: Spinal Thoracic laminectomy T 6-T [address] Day: 28   Problem List:   Hospital Problems  Date Reviewed: 1/13/2020          Codes Class Noted POA    Chronic respiratory failure with hypoxia (HCC) (Chronic) ICD-10-CM: J96.11  ICD-9-CM: 518.83, 799.02  Unknown Yes    Overview Signed 12/16/2019 10:11 PM by Elby Rinne, MD     On home oxygen inhalation at 3 LPM via NC             Opioid dependence ([facility] (Chronic) ICD-10-CM: F11.20  ICD-9-CM: 304.00  Unknown Yes    Overview Signed 12/16/2019 10:54 PM by Elby Rinne, MD     On Methadone             Acute blood loss as cause of postoperative anemia ICD-10-CM: D62  ICD-9-CM: 285.1  12/12/2019 Yes        Impaired mobility and ADLs ICD-10-CM: Z74.09  ICD-9-CM: 799.89  12/11/2019 Yes        Spinal cord compression ([facility] ICD-10-CM: G95.20  ICD-9-CM: 336.9  12/11/2019 Yes        Compression fracture of body of thoracic vertebra (HCC) ICD-10-CM: S22.000A  ICD-9-CM: 805.2  12/11/2019 Yes        * (Principal) Status post laminectomy with spinal fusion ICD-10-CM: Z98.1  ICD-9-CM: V45.4  12/11/2019 Yes    Overview Signed 12/16/2019 10:05 PM by Elby Rinne, MD     S/P T10, T9, T8 laminectomy; T7, T8, T9, T10, T11, T12 posterior thoracic fusion; segmental spinal instrumentation; K2M [place] type, T7-T8, T11-T12 (12/11/2019 - Dr. Dacia Maguire)             History of fracture due to fall ICD-10-CM: Z87.81  ICD-9-CM: V15.51  12/11/2019 Yes        Hypoxemia requiring supplemental oxygen (Chronic) ICD-10-CM: R09.02, Z99.81  ICD-9-CM: 799.02  12/29/2014 Yes        Chronic obstructive pulmonary disease (COPD) (HCC) (Chronic) ICD-10-CM: J44.9  ICD-9-CM: 496  Unknown Yes    Overview Signed 4/23/2013 10:01 AM by Ruel Green     SOB, on paula O2  Recent admission with psychosis             Hypertensive heart disease without heart failure (Chronic) ICD-10-CM: I11.9  ICD-9-CM: 402.90  Unknown Yes    Overview Signed 4/23/2013 10:02 AM by Hector CALLES     Better controlled             Type 2 diabetes mellitus with diabetic neuropathy, with long-term current use of insulin ([facility] (Chronic) ICD-10-CM: E11.40, Z79.4  ICD-9-CM: 250.60, 357.2, V58.67  Unknown Yes    Overview Signed 12/18/2019  2:13 PM by Sonja Whitaker MD     HbA1c (12/11/2019) = 7.1                   Background:   Past Medical History:   Past Medical History:   Diagnosis Date    Abnormally low high density lipoprotein (HDL) cholesterol with hypertriglyceridemia     Lipid profile (11/6/2016) showed , , HDL 38, LDL 37    Acute blood loss as cause of postoperative anemia 12/12/2019    Anxiety     Bipolar affective disorder ([facility] 12/5/2012    Cellulitis of right forearm 05/04/2017    Chronic back pain     Chronic obstructive pulmonary disease (COPD) (HCC)     SOB, on [place] O2 Recent admission with psychosis     Chronic respiratory failure with hypoxia (HCC)     On home oxygen inhalation at 3 LPM via NC    Contusion of left elbow 5/4/2017    Depression     Diabetic neuropathy associated with type 2 diabetes mellitus (HCC)     Diastolic dysfunction without heart failure     Stable on diuretics     Falls frequently     Gastroesophageal reflux disease with hiatal hernia     Generalized osteoarthritis of multiple sites     Gout     On Allopurinol    History of acute renal failure 5/31/2013    History of back injury     jumped out of second story window     History of fracture due to fall 12/11/2019    History of hepatitis C     treated    History of penicillin allergy     History of vitamin D deficiency 5/10/2017    Hypertensive heart disease without heart failure     Better controlled     Hyperuricemia 5/26/2017    Hypothyroidism     Hypoxemia requiring supplemental oxygen 12/29/2014    Intravenous drug user 5/2/2017    Long term current use of aspirin     Memory difficulty     Menopause     Mixed connective tissue disease ([facility] 5/10/2017    Obesity, Class I, BMI 30-34.9     Olecranon bursitis of right elbow 5/4/2017    [facility]     On Methadone    Recurrent genital herpes 5/31/2013    Right Achilles tendinitis 5/2/2017    Sarcoidosis     Sickle cell trait ([facility]     Tobacco use disorder     Type 2 diabetes mellitus with diabetic neuropathy, with long-term current use of insulin ([facility])     HbA1c (12/11/2019) = 7.1    Urge urinary incontinence 5/10/2017    Venous insufficiency     Wears glasses       Patient taking anticoagulants no    Patient has a defibrillator: no     Assessment:   Changes in Assessment throughout shift: None     Patient has central line: no Reasons if yes: Removed 12/16/19  Insertion date:n/a Last dressing date:n/a   Patient has Renae Cath: no Reasons if yes: n/a   Insertion date:n/a     Last Vitals:     Vitals:    01/12/20 2151 01/13/20 0716 01/13/20 1216 01/13/20 1555   BP: 125/70 119/69  139/75   Pulse: 71 76  82   Resp: 17 16  17   Temp: 98.4 °F (36.9 °C) 97 °F (36.1 °C)  98.3 °F (36.8 °C)   SpO2: 96% 97%  95%   Weight:       Height:   5' 5\" (1.651 m)    LMP: 11/25/2012        PAIN    Pain Assessment    Pain Intensity 1: 10 (01/13/20 1547) Pain Intensity 1: 2 (12/29/14 1105)    Pain Location 1: Perineum Pain Location 1: Abdomen    Pain Intervention(s) 1: Medication (see MAR) Pain Intervention(s) 1: Medication (see MAR)  Patient Stated Pain Goal: 0 Patient Stated Pain Goal: 0  o Intervention effective: yes    o Other actions taken for pain: no c/o     Skin Assessment  Skin color Skin Color: Appropriate for ethnicity  Condition/Temperature Skin Condition/Temp: Warm, Dry  Integrity Skin Integrity: Incision (comment)(Healed/intact)  Turgor Turgor: Non-tenting  Weekly Pressure Ulcer Documentation  Pressure  Injury Documentation: No Pressure Injury Noted-Pressure Ulcer Prevention Initiated  Wound Prevention & Protection Methods  Orientation of wound Orientation of Wound Prevention: Posterior  Location of Prevention Location of Wound Prevention: Sacrum/Coccyx  Dressing Present Dressing Present : No  Dressing Status Dressing Status: Intact  Wound Offloading Wound Offloading (Prevention Methods): Adaptive equipment, Bed, pressure reduction mattress, Chair cushion, Pillows, Repositioning, Turning, Wheelchair     INTAKE/OUPUT  Date 01/12/20 0700 - 01/13/20 0659 01/13/20 0700 - 01/14/20 0659   Shift [id number] 1900-0659 24 Hour Total 5503-3601 7273-2926 24 Hour Total   INTAKE   Shift Total(mL/kg)         OUTPUT   Urine(mL/kg/hr)           Urine Occurrence(s) 2 x 3 x 5 x 3 x  3 x   Stool           Stool Occurrence(s) 0 x 2 x 2 x 1 x  1 x   Shift Total(mL/kg)         NET         Weight (kg) 78.2 78.2 78.2 78.2 78.2 78.2       Recommendations:  1. Patient needs and requests: met    2. Diet: Cardiac DM Reg /thin liq    3. Pending tests/procedures: none     4. Functional Level/Equipment: wheelchair    5. Estimated Discharge Date: TBD Posted on Whiteboard in Patients Room: yes     HEALS Safety Check    A safety check occurred in the patient's room between off going nurse and oncoming nurse listed above.     The safety check included the below items  Area Items   H  High Alert Medications - Verify all high alert medication drips (heparin, PCA, etc.)   E  Equipment - Suction is set up for ALL patients (with yanker)  - Red plugs utilized for all equipment (IV pumps, etc.)  - WOWs wiped down at end of shift.  - Room stocked with oxygen, suction, and other unit-specific supplies   A  Alarms - Bed alarm is set for fall risk patients  - Ensure chair alarm is in place and activated if patient is up in a chair   L  Lines - Check IV for any infiltration  - Renae bag is empty if patient has a Renae   - Tubing and IV bags are labeled   S  Safety   - Room is clean, patient is clean, and equipment is clean. - Hallways are clear from equipment besides carts. - Fall bracelet on for fall risk patients  - Ensure room is clear and free of clutter  - Suction is set up for ALL patients (with yanker)  - Hallways are clear from equipment besides carts.    - Isolation precautions followed, supplies available outside room, sign posted

## 2020-01-13 NOTE — PROGRESS NOTES
SHIFT CHANGE NOTE FOR [facility]    Bedside and Verbal shift change report given to Jeanette Medina, RN (oncoming nurse) by Yu Toscano RN (offgoing nurse). Report included the following information SBAR, Kardex, MAR and Recent Results.     Situation:   Code Status: Full Code   Reason for Admission: Spinal Thoracic laminectomy T 6-T [address] Day: 28   Problem List:   Hospital Problems  Date Reviewed: 1/10/2020          Codes Class Noted POA    Chronic respiratory failure with hypoxia (HCC) (Chronic) ICD-10-CM: J96.11  ICD-9-CM: 518.83, 799.02  Unknown Yes    Overview Signed 12/16/2019 10:11 PM by David Carson MD     On home oxygen inhalation at 3 LPM via NC             Opioid dependence ([facility] (Chronic) ICD-10-CM: F11.20  ICD-9-CM: 304.00  Unknown Yes    Overview Signed 12/16/2019 10:54 PM by David Carson MD     On Methadone             Acute blood loss as cause of postoperative anemia ICD-10-CM: D62  ICD-9-CM: 285.1  12/12/2019 Yes        Impaired mobility and ADLs ICD-10-CM: Z74.09  ICD-9-CM: 799.89  12/11/2019 Yes        Spinal cord compression ([facility] [address] ICD-10-CM: G95.20  ICD-9-CM: 336.9  12/11/2019 Yes        Compression fracture of body of thoracic vertebra (HCC) ICD-10-CM: S22.000A  ICD-9-CM: 805.2  12/11/2019 Yes        * (Principal) Status post laminectomy with spinal fusion ICD-10-CM: Z98.1  ICD-9-CM: V45.4  12/11/2019 Yes    Overview Signed 12/16/2019 10:05 PM by David Carson MD     S/P T10, T9, T8 laminectomy; T7, T8, T9, T10, T11, T12 posterior thoracic fusion; segmental spinal instrumentation; K2M [place] type, T7-T8, T11-T12 (12/11/2019 - Dr. Steven Saba)             History of fracture due to fall ICD-10-CM: Z87.81  ICD-9-CM: V15.51  12/11/2019 Yes        Hypoxemia requiring supplemental oxygen (Chronic) ICD-10-CM: R09.02, Z99.81  ICD-9-CM: 799.02  12/29/2014 Yes        Chronic obstructive pulmonary disease (COPD) (HCC) (Chronic) ICD-10-CM: J44.9  ICD-9-CM: 496  Unknown Yes    Overview Signed 4/23/2013 10:01 AM by Anay Todd     SOB, on paula O2  Recent admission with psychosis             Hypertensive heart disease without heart failure (Chronic) ICD-10-CM: I11.9  ICD-9-CM: 402.90  Unknown Yes    Overview Signed 4/23/2013 10:02 AM by Cecilia CALLES     Better controlled             Type 2 diabetes mellitus with diabetic neuropathy, with long-term current use of insulin ([facility] [address] (Chronic) ICD-10-CM: E11.40, Z79.4  ICD-9-CM: 250.60, 357.2, V58.67  Unknown Yes    Overview Signed 12/18/2019  2:13 PM by Ck Samuel MD     HbA1c (12/11/2019) = 7.1                   Background:   Past Medical History:   Past Medical History:   Diagnosis Date    Abnormally low high density lipoprotein (HDL) cholesterol with hypertriglyceridemia     Lipid profile (11/6/2016) showed , , HDL 38, LDL 37    Acute blood loss as cause of postoperative anemia 12/12/2019    Anxiety     Bipolar affective disorder ([facility] 12/5/2012    Cellulitis of right forearm 05/04/2017    Chronic back pain     Chronic obstructive pulmonary disease (COPD) (HCC)     SOB, on pa[place] O2 Recent admission with psychosis     Chronic respiratory failure with hypoxia (HCC)     On home oxygen inhalation at 3 LPM via NC    Contusion of left elbow 5/4/2017    Depression     Diabetic neuropathy associated with type 2 diabetes mellitus (HCC)     Diastolic dysfunction without heart failure     Stable on diuretics     Falls frequently     Gastroesophageal reflux disease with hiatal hernia     Generalized osteoarthritis of multiple sites     Gout     On Allopurinol    History of acute renal failure 5/31/2013    History of back injury     jumped out of second story window     History of fracture due to fall 12/11/2019    History of hepatitis C     treated    History of penicillin allergy     History of vitamin D deficiency 5/10/2017    Hypertensive heart disease without heart failure     Better controlled     Hyperuricemia 5/26/2017    Hypothyroidism     Hypoxemia requiring supplemental oxygen 12/29/2014    Intravenous drug user 5/2/2017    Long term current use of aspirin     Memory difficulty     Menopause     Mixed connective tissue disease ([facility] 5/10/2017    Obesity, Class I, BMI 30-34.9     Olecranon bursitis of right elbow 5/4/2017    [facility]     On Methadone    Recurrent genital herpes 5/31/2013    Right Achilles tendinitis 5/2/2017    Sarcoidosis     Sickle cell trait ([facility]     Tobacco use disorder     Type 2 diabetes mellitus with diabetic neuropathy, with long-term current use of insulin ([facility])     HbA1c (12/11/2019) = 7.1    Urge urinary incontinence 5/10/2017    Venous insufficiency     Wears glasses       Patient taking anticoagulants no    Patient has a defibrillator: no     Assessment:   Changes in Assessment throughout shift: None     Patient has central line: no Reasons if yes: Removed 12/16/19  Insertion date:n/a Last dressing date:n/a   Patient has Renae Cath: no Reasons if yes: n/a   Insertion date:n/a     Last Vitals:     Vitals:    01/12/20 0734 01/12/20 1629 01/12/20 2151 01/13/20 0716   BP: 132/73 130/72 125/70 119/69   Pulse: 73 83 71 76   Resp: 18 20 17 16   Temp: 97.9 °F (36.6 °C) 98.5 °F (36.9 °C) 98.4 °F (36.9 °C) 97 °F (36.1 °C)   SpO2: 97% 95% 96% 97%   Weight:       LMP: 11/25/2012        PAIN    Pain Assessment    Pain Intensity 1: 7 (01/13/20 0701) Pain Intensity 1: 2 (12/29/14 1105)    Pain Location 1: Perineum Pain Location 1: Abdomen    Pain Intervention(s) 1: Medication (see MAR) Pain Intervention(s) 1: Medication (see MAR)  Patient Stated Pain Goal: 0 Patient Stated Pain Goal: 0  o Intervention effective: yes    o Other actions taken for pain: no c/o     Skin Assessment  Skin color Skin Color: Appropriate for ethnicity  Condition/Temperature Skin Condition/Temp: Dry, Warm  Integrity Skin Integrity: Incision (comment)  Turgor Turgor: Non-tenting  Weekly Pressure Ulcer Documentation  Pressure  Injury Documentation: No Pressure Injury Noted-Pressure Ulcer Prevention Initiated  Wound Prevention & Protection Methods  Orientation of wound Orientation of Wound Prevention: Posterior  Location of Prevention Location of Wound Prevention: Sacrum/Coccyx  Dressing Present Dressing Present : No  Dressing Status Dressing Status: Intact  Wound Offloading Wound Offloading (Prevention Methods): Bed, pressure redistribution/air, Repositioning     INTAKE/OUPUT  Date 01/12/20 0700 - 01/13/20 0659 01/13/20 0700 - 01/14/20 0659   Shift 0700-1859 1900-0659 24 Hour Total 0700-1859 1900-0659 24 Hour Total   INTAKE   Shift Total(mL/kg)         OUTPUT   Urine(mL/kg/hr)           Urine Occurrence(s) 2 x 3 x 5 x      Stool           Stool Occurrence(s) 0 x 2 x 2 x      Shift Total(mL/kg)         NET         Weight (kg) 78.2 78.2 78.2 78.2 78.2 78.2       Recommendations:  1. Patient needs and requests: met    2. Diet: Cardiac DM Reg /thin liq    3. Pending tests/procedures: none     4. Functional Level/Equipment: wheelchair    5. Estimated Discharge Date: TBD Posted on Whiteboard in Patients Room: yes     HEALS Safety Check    A safety check occurred in the patient's room between off going nurse and oncoming nurse listed above.     The safety check included the below items  Area Items   H  High Alert Medications - Verify all high alert medication drips (heparin, PCA, etc.)   E  Equipment - Suction is set up for ALL patients (with yanker)  - Red plugs utilized for all equipment (IV pumps, etc.)  - WOWs wiped down at end of shift.  - Room stocked with oxygen, suction, and other unit-specific supplies   A  Alarms - Bed alarm is set for fall risk patients  - Ensure chair alarm is in place and activated if patient is up in a chair   L  Lines - Check IV for any infiltration  - Renae bag is empty if patient has a Renae   - Tubing and IV bags are labeled   S  Safety   - Room is clean, patient is clean, and equipment is clean. - Hallways are clear from equipment besides carts. - Fall bracelet on for fall risk patients  - Ensure room is clear and free of clutter  - Suction is set up for ALL patients (with lashay)  - Hallways are clear from equipment besides carts.    - Isolation precautions followed, supplies available outside room, sign posted

## 2020-01-13 NOTE — PROGRESS NOTES
Problem: Mobility Impaired (Adult and Pediatric)  Goal: *Therapy Goal (Edit Goal, Insert Text)  Description  Physical Therapy Goals:  STG= LTG  Initiated 2019, re-assessed 2020 for d/c 2020:   1. Patient will move from supine to sit and sit to supine with min A, and roll side to side in bed with contact guard assist. (modA)  2. Patient will transfer from bed to chair and chair to bed with contact guard assist using lateral transfer board, equal heights; and min A with incline to higher surface (24\" height bed) (modA)  3. Patient will perform sit to stand with moderate assistance and least restrictive device. 4.  Patient will perform car transfer with lateral transfer board and min A. (modA)  5. Patient will perform wheelchair mobility over even surfaces at mod independent level for at least 150 ft, including negotiation of doorways. (Elida)  6. Patient will perform wheelchair mobility up/down ramp, uneven sidewalk min A level.  (MET)           Outcome: Progressing Towards Goal   PHYSICAL THERAPY DISCHARGE NOTE    Patient: Racquel Rea (65 y.o. female)  Date: 2020  Diagnosis: Status post laminectomy with spinal fusion [Z98.1] Status post laminectomy with spinal fusion  Precautions: Fall, Spinal(thoracic)  Chart, physical therapy assessment, plan of care and goals were reviewed. Pain:  Pt pain was reported as  No c/o pre-treatment. Pt pain was reported as  No c/o post-treatment.   Intervention: NA    Time in:1103  Time out:1200    Pt seen for:w/c mobility, txfr training, bed mobility    Patient identified with name and : yes     SUBJECTIVE:     Patient stated: \"I think my brother's car is too low to get into but maybe that's what is best.\"    OBJECTIVE DATA SUMMARY:   AROM: grossly decreased, non-functional    FIM SCORES Initial Assessment Discharge Assessment   Bed/Chair/Wheelchair Transfers 1 3   Wheelchair Mobility 2 5   Walking [place] 0 1   Steps/Stairs 0 1   PRIMARY MODE OF LOCOMOTION: w/c  Please see C Interdisciplinary Eval: Coordination/Balance Section for details regarding FIM score description.     BED/CHAIR/WHEELCHAIR TRANSFERS Initial Assessment Discharge Assessment   Rolling Right 2 (Maximal assistance) 3 (Moderate assistance )   Rolling Left 2 (Maximal assistance) 3 (Moderate assistance )   Supine to Sit 2 (Maximal assistance) 3 (Moderate assistance)   Sit to Stand Total assistance  total assist(based on last attempt)   Sit to Supine 2 (Maximal assistance) 3 (Moderate assistance)   Transfer Assist Score 1 3   Transfer Type Lateral with transfer board Lateral with transfer board   Comments using transfer board needing max A x 2 for safety, cues for sequencing and appropriate scooting technique  Pt performed slide board txfr w/c<->24\" mat table with mod assist.   Car Transfer Not tested Moderate assistance(min assist for slide board txfr but mod assist with BLE to ingress/egress car   Car Type N/A car txfr simulator       WHEELCHAIR MOBILITY/MANAGEMENT Initial Assessment Discharge Assessment   Able to Propel 140 feet(max A for steering) 220 feet   Functional Level 2 5   Curbs/ramps assistance required 0 (Not tested) 4(min assist)   Wheelchair set up assistance required 2 (Maximal assistance) 3 (Moderate assistance)   Wheelchair management Manages left brake(needing assist with brakes) Manages left brake;Manages right brake       WALKING INDEPENDENCE Initial Assessment Discharge Assessment   Assistive device (N/A)  NA   Ambulation assistance - level surface       Distance 0 Feet (ft) 0 Feet (ft)   Functional Level 0 1   Comments not tested at this time due to safety concern  not safe to test at this time due to pt unable to stand   Ambulation assistance - unlevel surface    NA       STEPS/STAIRS Initial Assessment Discharge Assessment   Steps/Stairs ambulated 0 0   Rail Use (N/A)  NA   Functional Level 0 1   Comments unable to assess at this time due to safety/weakness Pt unable to stand to test   Curbs/Ramps    NA     LTGs: Pt met 1/6 LTGs. ASSESSMENT:  Pt continues to require mod assist for all bed mobility and slide board txfrs. Pt is not safe to stand with AD, thus unable to progress to ambulation. PLAN:  Pt would benefit from continued skilled physical therapy in order to improve independent functional mobility at home health level. Interventions may include range of motion (AROM, PROM B LE/trunk), motor function (B LE/trunk strengthening/coordination), activity tolerance (vitals, oxygen saturation levels), bed mobility training, balance activities, gait training (progressive ambulation program), and functional transfer training. Discharge Recommendations:  Home Health with 24 hour care  Further Equipment Recommendations for Discharge:  wheelchair 18 inch and slide board       Activity Tolerance:   fair  Please refer to the flowsheet for vital signs taken during this treatment.   After treatment:   [x] Patient left in no apparent distress sitting up in chair  [] Patient left in no apparent distress in bed  [] Call bell left within reach  [] Nursing notified  [] Caregiver present  [] Bed alarm activated      Soraida Weaver PTA  1/13/2020

## 2020-01-14 NOTE — DISCHARGE INSTRUCTIONS
DISCHARGE SUMMARY from Nurse    PATIENT INSTRUCTIONS:    After general anesthesia or intravenous sedation, for 24 hours or while taking prescription Narcotics:  · Limit your activities  · Do not drive and operate hazardous machinery  · Do not make important personal or business decisions  · Do  not drink alcoholic beverages  · If you have not urinated within 8 hours after discharge, please contact your surgeon on call. Report the following to your surgeon:  · Excessive pain, swelling, redness or odor of or around the surgical area  · Temperature over 100.5  · Nausea and vomiting lasting longer than 4 hours or if unable to take medications  · Any signs of decreased circulation or nerve impairment to extremity: change in color, persistent  numbness, tingling, coldness or increase pain  · Any questions    What to do at Home:    I*  Please give a list of your current medications to your Primary Care Provider. *  Please update this list whenever your medications are discontinued, doses are      changed, or new medications (including over-the-counter products) are added. *  Please carry medication information at all times in case of emergency situations. These are general instructions for a healthy lifestyle:    No smoking/ No tobacco products/ Avoid exposure to second hand smoke  Surgeon General's Warning:  Quitting smoking now greatly reduces serious risk to your health. Obesity, smoking, and sedentary lifestyle greatly increases your risk for illness    A healthy diet, regular physical exercise & weight monitoring are important for maintaining a healthy lifestyle    You may be retaining fluid if you have a history of heart failure or if you experience any of the following symptoms:  Weight gain of 3 pounds or more overnight or 5 pounds in a week, increased swelling in our hands or feet or shortness of breath while lying flat in bed.   Please call your doctor as soon as you notice any of these symptoms; do not wait until your next office visit. The discharge information has been reviewed with the patient. The patient verbalized understanding. Discharge medications reviewed with the patient and appropriate educational materials and side effects teaching were provided. ___________________________________________________________________________________________________________________________________    ------------------------------------------------------------------------------------------------------------  Patient armband removed and shredded  DISCHARGE INSTRUCTIONS  MyChart Activation    Thank you for requesting access to [employer]. Please follow the instructions below to securely access and download your online medical record. [employer] allows you to send messages to your doctor, view your test results, renew your prescriptions, schedule appointments, and more. How Do I Sign Up? 1. In your internet browser, go to [email or web address]  2. Click on the First Time User? Click Here link in the Sign In box. You will be redirect to the New Member Sign Up page. 3. Enter your [employer] Access Code exactly as it appears below. You will not need to use this code after youve completed the sign-up process. If you do not sign up before the expiration date, you must request a new code. Guardly Access Code: [email or web address]  Expires: 2020 11:57 PM (This is the date your [employer] access code will )    4. Enter the last four digits of your Social Security Number (xxxx) and Date of Birth (mm/dd/yyyy) as indicated and click Submit. You will be taken to the next sign-up page. 5. Create a Guardly ID. This will be your [employer] login ID and cannot be changed, so think of one that is secure and easy to remember. 6. Create a Guard[employer] password. You can change your password at any time. 7. Enter your Password Reset Question and Answer. This can be used at a later time if you forget your password.    8. Enter your e-mail address. You will receive e-mail notification when new information is available in [address] 9. Click Sign Up. You can now view and download portions of your medical record. 10. Click the Download Summary menu link to download a portable copy of your medical information. Additional Information    If you have questions, please visit the Frequently Asked Questions section of the [employer] website at [email or web address] Remember, [employer] is NOT to be used for urgent needs. For medical emergencies, dial 911.      1. Make sure that when you request refills at the pharmacy that the refill requests are sent to your PCP (NOT to the prescriber at the [facility]) to avoid any delays in getting your medication refills. The physician at the [facility] will not able to order medications or refills after discharge -- Please understand that though we would like to help, it is simply not safe for our physician to order you a medication that we cannot monitor. 2. If any of the prescribed medications require a PRIOR AUTHORIZATION, contact your Primary Care Physician or specialist to EITHER complete prior authorizations and paperwork on your behalf OR prescribe an alternative medication. -- Please understand that though we would like to help, it is simply not safe for our physician to order you a medication that we cannot monitor. 3. If any of your prescriptions medications PRIOR TO ADMISSION TO [facility] [place] [address] needs a refill, kindly contact your Primary Care Physician for refills.     -------------------------------------------------------------------------------------------------------------------

## 2020-01-14 NOTE — PROGRESS NOTES
SHIFT CHANGE NOTE FOR [facility]    Bedside and Verbal shift change report given to Tomeka Theodore LPN (oncoming nurse) by Barb Mendes (offgoing nurse). Report included the following information SBAR, Kardex, MAR and Recent Results.     Situation:   Code Status: Full Code   Reason for Admission: Spinal Thoracic laminectomy T 6-T [address] Day: 29   Problem List:   Hospital Problems  Date Reviewed: 1/13/2020          Codes Class Noted POA    Chronic respiratory failure with hypoxia (HCC) (Chronic) ICD-10-CM: J96.11  ICD-9-CM: 518.83, 799.02  Unknown Yes    Overview Signed 12/16/2019 10:11 PM by Venu Hummel MD     On home oxygen inhalation at 3 LPM via NC             Opioid dependence ([facility] (Chronic) ICD-10-CM: F11.20  ICD-9-CM: 304.00  Unknown Yes    Overview Signed 12/16/2019 10:54 PM by Venu Hummel MD     On Methadone             Acute blood loss as cause of postoperative anemia ICD-10-CM: D62  ICD-9-CM: 285.1  12/12/2019 Yes        Impaired mobility and ADLs ICD-10-CM: Z74.09  ICD-9-CM: 799.89  12/11/2019 Yes        Spinal cord compression ([facility] ICD-10-CM: G95.20  ICD-9-CM: 336.9  12/11/2019 Yes        Compression fracture of body of thoracic vertebra (HCC) ICD-10-CM: S22.000A  ICD-9-CM: 805.2  12/11/2019 Yes        * (Principal) Status post laminectomy with spinal fusion ICD-10-CM: Z98.1  ICD-9-CM: V45.4  12/11/2019 Yes    Overview Signed 12/16/2019 10:05 PM by Venu Hummel MD     S/P T10, T9, T8 laminectomy; T7, T8, T9, T10, T11, T12 posterior thoracic fusion; segmental spinal instrumentation; K2M Davis type, T7-T8, T11-T12 (12/11/2019 - Dr. Lorena López)             History of fracture due to fall ICD-10-CM: Z87.81  ICD-9-CM: V15.51  12/11/2019 Yes        Hypoxemia requiring supplemental oxygen (Chronic) ICD-10-CM: R09.02, Z99.81  ICD-9-CM: 799.02  12/29/2014 Yes        Chronic obstructive pulmonary disease (COPD) (HCC) (Chronic) ICD-10-CM: J44.9  ICD-9-CM: 496  Unknown Yes    Overview Signed 4/23/2013 10:01 AM by Francine Henson     SOB, on paula O2  Recent admission with psychosis             Hypertensive heart disease without heart failure (Chronic) ICD-10-CM: I11.9  ICD-9-CM: 402.90  Unknown Yes    Overview Signed 4/23/2013 10:02 AM by Gloria CALLES     Better controlled             Type 2 diabetes mellitus with diabetic neuropathy, with long-term current use of insulin ([facility] (Chronic) ICD-10-CM: E11.40, Z79.4  ICD-9-CM: 250.60, 357.2, V58.67  Unknown Yes    Overview Signed 12/18/2019  2:13 PM by Sterling Almanza MD     HbA1c (12/11/2019) = 7.1                   Background:   Past Medical History:   Past Medical History:   Diagnosis Date    Abnormally low high density lipoprotein (HDL) cholesterol with hypertriglyceridemia     Lipid profile (11/6/2016) showed , , HDL 38, LDL 37    Acute blood loss as cause of postoperative anemia 12/12/2019    Anxiety     Bipolar affective disorder ([facility] 12/5/2012    Cellulitis of right forearm 05/04/2017    Chronic back pain     Chronic obstructive pulmonary disease (COPD) (HCC)     SOB, on paula O2 Recent admission with psychosis     Chronic respiratory failure with hypoxia (HCC)     On home oxygen inhalation at 3 LPM via NC    Contusion of left elbow 5/4/2017    Depression     Diabetic neuropathy associated with type 2 diabetes mellitus (HCC)     Diastolic dysfunction without heart failure     Stable on diuretics     Falls frequently     Gastroesophageal reflux disease with hiatal hernia     Generalized osteoarthritis of multiple sites     Gout     On Allopurinol    History of acute renal failure 5/31/2013    History of back injury     jumped out of second story window     History of fracture due to fall 12/11/2019    History of hepatitis C     treated    History of penicillin allergy     History of vitamin D deficiency 5/10/2017    Hypertensive heart disease without heart failure     Better controlled     Hyperuricemia 5/26/2017    Hypothyroidism     Hypoxemia requiring supplemental oxygen 12/29/2014    Intravenous drug user 5/2/2017    Long term current use of aspirin     Memory difficulty     Menopause     Mixed connective tissue disease ([facility] 5/10/2017    Obesity, Class I, BMI 30-34.9     Olecranon bursitis of right elbow 5/4/2017    [facility]     On Methadone    Recurrent genital herpes 5/31/2013    Right Achilles tendinitis 5/2/2017    Sarcoidosis     Sickle cell trait ([facility]     Tobacco use disorder     Type 2 diabetes mellitus with diabetic neuropathy, with long-term current use of insulin ([facility])     HbA1c (12/11/2019) = 7.1    Urge urinary incontinence 5/10/2017    Venous insufficiency     Wears glasses       Patient taking anticoagulants no    Patient has a defibrillator: no     Assessment:   Changes in Assessment throughout shift: None     Patient has central line: no Reasons if yes: Removed 12/16/19  Insertion date:n/a Last dressing date:n/a   Patient has Renae Cath: no Reasons if yes: n/a   Insertion date:n/a     Last Vitals:     Vitals:    01/13/20 0716 01/13/20 1216 01/13/20 1555 01/13/20 2205   BP: 119/69  139/75 120/64   Pulse: 76  82 72   Resp: 16  17 16   Temp: 97 °F (36.1 °C)  98.3 °F (36.8 °C) 97 °F (36.1 °C)   SpO2: 97%  95% 95%   Weight:       Height:  5' 5\" (1.651 m)     LMP: 11/25/2012        PAIN    Pain Assessment    Pain Intensity 1: 0 (01/14/20 0403) Pain Intensity 1: 2 (12/29/14 1105)    Pain Location 1: Back Pain Location 1: Abdomen    Pain Intervention(s) 1: Medication (see MAR) Pain Intervention(s) 1: Medication (see MAR)  Patient Stated Pain Goal: 0 Patient Stated Pain Goal: 0  o Intervention effective: yes    o Other actions taken for pain: no c/o     Skin Assessment  Skin color Skin Color: Appropriate for ethnicity  Condition/Temperature Skin Condition/Temp: Warm  Integrity Skin Integrity: Incision (comment)  Turgor Turgor: Non-tenting  Weekly Pressure Ulcer Documentation  Pressure  Injury Documentation: No Pressure Injury Noted-Pressure Ulcer Prevention Initiated  Wound Prevention & Protection Methods  Orientation of wound Orientation of Wound Prevention: Posterior  Location of Prevention Location of Wound Prevention: Sacrum/Coccyx  Dressing Present Dressing Present : No  Dressing Status Dressing Status: Intact  Wound Offloading Wound Offloading (Prevention Methods): Bed, pressure redistribution/air, Bed, pressure reduction mattress, Adaptive equipment     INTAKE/OUPUT  Date 01/13/20 0700 - 01/14/20 0659 01/14/20 0700 - 01/15/20 0659   Shift 0700-1859 1900-0659 24 Hour Total 0700-1859 1900-0659 24 Hour Total   INTAKE   Shift Total(mL/kg)         OUTPUT   Urine(mL/kg/hr)           Urine Occurrence(s) 3 x 2 x 5 x      Stool           Stool Occurrence(s) 1 x 0 x 1 x      Shift Total(mL/kg)         NET         Weight (kg) 78.2 78.2 78.2 78.2 78.2 78.2       Recommendations:  1. Patient needs and requests: met    2. Diet: Cardiac DM Reg /thin liq    3. Pending tests/procedures: none     4. Functional Level/Equipment: wheelchair    5. Estimated Discharge Date: TBD Posted on Whiteboard in Patients Room: yes     HEALS Safety Check    A safety check occurred in the patient's room between off going nurse and oncoming nurse listed above.     The safety check included the below items  Area Items   H  High Alert Medications - Verify all high alert medication drips (heparin, PCA, etc.)   E  Equipment - Suction is set up for ALL patients (with yanker)  - Red plugs utilized for all equipment (IV pumps, etc.)  - WOWs wiped down at end of shift.  - Room stocked with oxygen, suction, and other unit-specific supplies   A  Alarms - Bed alarm is set for fall risk patients  - Ensure chair alarm is in place and activated if patient is up in a chair   L  Lines - Check IV for any infiltration  - Renae bag is empty if patient has a Renae   - Tubing and IV bags are labeled   S  Safety   - Room is clean, patient is clean, and equipment is clean. - Hallways are clear from equipment besides carts. - Fall bracelet on for fall risk patients  - Ensure room is clear and free of clutter  - Suction is set up for ALL patients (with lashay)  - Hallways are clear from equipment besides carts.    - Isolation precautions followed, supplies available outside room, sign posted

## 2020-01-14 NOTE — PROGRESS NOTES
Samuel spoke with pt and her daughter this morning. Pt and daughter agreeable to dc to home today. Daughter to  bedside commode and transfer board at [facility] today. Daughter has borrowed wheelchair until [employer] approves wheelchair from [facility]. Once approved [facility] will deliver the wheelchair to the home. [address] has been set up for home therapy. Samuel spoke with pt's  from her insurance -[employer]achle Moore ([phone]). Ms Yusef Luong agrees to follow up with pt and daughter. Transport arranged through [employer] for  by [employer]. Samuel spoke with dispatch at [employer] who confirms 530  and states that no PCS is needed. No further needs are noted at this time.

## 2020-01-14 NOTE — DISCHARGE SUMMARY
[address] [facility], [place] 
[address] [place] [address], [address] INPATIENT REHABILITATION 
DISCHARGE SUMMARY Name: Rosanna Jackson MRN: [id number] Age / Sex: 62 y.o. / female CSN: [id number] YOB: 1955 Length of Stay: 29 days Admit Date: 12/16/2019 Discharge Date: 1/14/2020 PRIMARY CARE PHYSICIAN: Susan Miller NP 
 
 
DISCHARGE DIAGNOSES: 
 
Primary Rehabilitation Diagnosis 1. Impaired Mobility and ADLs 2. S/P T10, T9, T8 laminectomy; T7, T8, T9, T10, T11, T12 posterior thoracic fusion; segmental spinal instrumentation; K2M Walton type, T7-T8, T11-T12 (12/11/2019 - Dr. Jennifer Arora) 3. History of acute compression fractures of body of thoracic vertebrae (T9 and T10) due to fall 
  
Comorbidities  Diabetic neuropathy associated with type 2 diabetes mellitus (HCC) E11.40  Venous insufficiency I87.2  Obesity, Class I, BMI 30-34.9 E66.9  Chronic back pain M54.9, G89.29  
 Generalized osteoarthritis of multiple sites M15.9  Bipolar affective disorder  F31.9  Abnormally low high density lipoprotein (HDL) cholesterol with hypertriglyceridemia E78.6, E78.1  Diastolic dysfunction without heart failure I51.89  Chronic obstructive pulmonary disease (COPD)  J44.9  Hypertensive heart disease without heart failure I11.9  Depression F32.9  History of back injury Z87.828  Type 2 diabetes mellitus with diabetic neuropathy, with long-term current use of insulin  E11.40, Z79.4  Anxiety F41.9  Wears glasses Z97.3  History of hepatitis C Z86.19  
 Sickle cell trait D57.3  History of acute renal failure Z87.448  Hypothyroidism E03.9  Recurrent genital herpes A60.00  Sarcoidosis D86.9  Abscess of right arm L02.413  Hypoxemia requiring supplemental oxygen R09.02, Z99.81  
 Abnormal nuclear stress test R94.39  
 Cellulitis and abscess of hand L03.119, L02.519  
 Cellulitis and abscess of foot L03.119, L02.619  
  Cellulitis of arm, right L03. 113  
 Olecranon bursitis of right elbow M70.21  
 Contusion of left elbow S50. 02XA  Intravenous drug user F19.90  Right Achilles tendinitis M76.61  
 History of penicillin allergy Z88.0  Falls frequently R29.6  Gastroesophageal reflux disease with hiatal hernia K21.9, K44.9  Memory difficulty R41.3  Mixed connective tissue disease  M35.1  Hyperuricemia E79.0  History of vitamin D deficiency Z86.39  
 Urge urinary incontinence N39.41  
 Acute blood loss as cause of postoperative anemia D62  
 History of fracture due to fall Z87.81  Chronic respiratory failure with hypoxia  J96.11  
 Cellulitis of right forearm L03. 113  
 Gout M10.9  Menopause Z78.0  Long term current use of aspirin Z79.82  
 Opioid dependence  F11.20  Tobacco use disorder F17.200  
  
 
CONSULTS CALLED: None PROCEDURES DONE: None BRIEF HISTORY: The patient is a 62year-old Black female with multiple medical comorbidities who was admitted to [facility] on 12/11/2019 due to weakness of both lower extremities. The patient was apparently well until ~4 days prior to admission, the patient had 4 falls as she stated she lost her balance. Since then, the patient noticed her legs became weaker and weaker to the point that she cannot walk anymore. The patient was brought to the [facility] Emergency Department for further evaluation. The patient was seen and examined by Emergency Medicine RetiANDRE Burnham). 
  
Excerpt (Additional History) from the ED Provider Note ANDRE Erickson: 
\"Stephen Sinha is a 62 y. o. female with PMHX of hypertension, sarcoidosis, COPD on chronic O2 by nasal cannula, chronic pain on methadone, gout, diabetes, CKD who presents to the emergency department C/O progressively worsening bilateral lower extremity 3 days.  Patient states she was seen at [facility] ED 3 days ago for increasing cough and congestion with sputum production x1 month.  She also complained of low back pain which is chronic for her but seem to be worsening and attributed to the cough.  She was recently prescribed steroid tapers for gout and COPD exacerbation. Briseida Dutton is also given Skelaxin 3 days ago which she has been taking without relief.  3 days ago she developed worsening bilateral lower extremity symmetric weakness, so discontinued the Skelaxin without improvement.  Today she has not been able to ambulate or bear weight at all. Pt denies lower extremity sensation changes, saddle anesthesia, bowel or bladder incontinence, abdominal pain, new injury, trauma or fall, and any other sxs or complaints. \" 
  
X-ray of the chest (12/10/2019) showed prominent pulmonary interstitium likely at least partly due to chronic interstitial lung disease; component of interstitial infiltrate/edema difficult to completely exclude; no confluent consolidation. 
  
X-ray of the lumbar spine (12/10/2019) showed no clearly acute findings. 
  
CT scan of the head (12/10/2019) showed no acute intracranial hemorrhage or territorial infarct; no mass effect; unremarkable for age. 
  
MRI of the lumbar spine (12/10/2019) showed degenerative changes most notable at L3-S1 characterized by mild disc herniations and mild to moderate facet arthropathy; L4-L5 moderate spinal canal stenosis; no significant spinal canal stenosis elsewhere; L5-S1 moderate foraminal stenoses with possible abutment of the right L5 nerve; mild L4-L5 foraminal stenoses; L5-S1 annular fissure noted a potential source of pain; no clearly acute findings; incidental findings including suspected splenic cyst or hemangioma, probable renal cysts, and distended bladder; additional subtle finding of left paraspinous muscle edema at T10 level, noted at field-of-view edge, incompletely evaluated. 
  
MRI of the thoracic spine (12/11/2019) showed findings that suggest acute compression fractures at T9 and T10, with severe vertebral body height loss at T9 and associated retropulsion of about 4 mm at the level of T9; this results in severe spinal canal stenosis at this level, with mild cord compression and surrounding cord edema; severe bilateral foraminal stenosis is also present at T9-T10; moderate to severe bilateral foraminal stenosis at T10-T11; moderate to severe right 
foraminal stenosis at T8-T9; chronic T5 compression fracture without retropulsion; there is moderate bilateral foraminal stenosis at T5-T6; redemonstrated T2 bright mass in the spleen, likely representing splenic hemangioma; additional T2 bright lesions or favor represent splenic cysts. 
  
MRI of the cervical spine (12/11/2019) showed mild to moderate degenerative changes, most pronounced at C6-C7 where there is moderate spinal canal stenosis and severe left foraminal stenosis; additional mild-to-moderate degenerative changes. 
  
Spine Surgery consult (Thom Dumont) was called for evaluation and comanagement. 
  
[facility] TeleNeurology (Dr. Jade Castro called for clearance for possible central pontine myelinolysis since serum sodium had gone from 120 at 5PM to 128 at 4AM. As per communication between the ED physician and Dr. Shawanda Black, the patient had normal cranial nerve function and upper body strength and this is not compatible with CPM. 
  
The patient was admitted under the service of the [facility] (Morro Ruff).   
Excerpt (History of the Present Illness) from the H&P by Suong Nigel Bloch, [address]: 
\"Ms. Sinha is a 61 yo [place] American female with a past medical history of Type 2 DM, COPD, sarcoidosis on chronic steroids/on 3L NC, gout, hyperlipidemia and urinary incontinence who presented to [facility] ED for falls at home, low back pain and paraparesis.  Patient states she has fallen at home 4 times since Saturday Dec 7, 2019. She \"lost her balance\". She lives at home with a friend who had to assist her up from the floor. She denied head trauma and LOC. Ever since the falls, patient states she can not move her legs/ambulate. At baseline she is able to ambulate independently without assistance. Patient endorses \"cold for the past 1 month\" with productive cough yellow sputum. She endorses low back pain and the inability to move LEs. She denies fever, chills, chest pain, palpitations, SOB, abd pain, nausea/vomiting, bowel incontinence. \" 
  
The patient underwent a T10, T9, T8 laminectomy; T7, T8, T9, T10, T11, T12 posterior thoracic fusion; segmental spinal instrumentation; K2M Lamy type, T7-T8, T11-T12 on 12/11/2019 done by Dr. Hutton Mouse patient tolerated the procedure well with no intraoperative complications. The patient developed acute postoperative blood loss anemia but no blood transfusion was given. Patient was given 2 doses of intravenous iron. 
  
Pulmonary Medicine consult (Dr. Candice Merchant) was called for evaluation and comanagement. 
  
MRI of the thoracic spine (12/16/2019) showed postsurgical change from T8 through T10 posterior decompression with hardware fusion T7-T12 for treatment of canal stenosis due to pathologic T9 compression fracture; preserved spinal alignment; similar mildly increased upper thoracic curvature due to chronic T5 compression deformity; similar mild inferior endplate compression deformity T10; patchy peripheral airspace disease dependent right lung suspected to reflect acute inflammatory process. 
  
Pain was controlled with Percocet 5/325. 
  
SCDs were used for DVT prophylaxis. 
  
The patient had remained hemodynamically stable but due to the abovementioned neurologic deficit, the patient was noted to have impaired mobility and ADLs. Patient was felt to be a good candidate for acute inpatient rehabilitation.  Upon evaluation by Physical Therapy and Occupational Therapy, the patient was recommended for acute inpatient rehabilitation. The patient was discharged and was subsequently admitted to the [facility] for Physical Rehabilitation for intensive rehabilitation to help recover strength, function and mobility. COURSE IN THE HOSPITAL: Upon admission to the [facility], the patient underwent physical therapy, occupational therapy and speech therapy. The patient was able to actively participate in the rehabilitation activities and progressed well. On discharge, the patient was able to perform the following activities: 
 
1. Occupational Therapy ON ADMISSION ON DISCHARGE Eating Functional Level: 5 Eating Functional Level: 5 Grooming Functional Level: 4 Grooming Functional Level: 4 Bathing Functional Level: 2 Bathing Functional Level: 2 Upper Body Dressing Functional Level: 3 Upper Body Dressing Functional Level: 3 Lower Body Dressing Functional Level: 1 Lower Body Dressing Functional Level: 1 Toileting Functional Level: 1 Toileting Functional Level: 1 Toilet Transfers Toilet Transfer Score: 0 Toilet Transfers Toilet Transfer Score: 1 Tub Yessica Lush Transfers Tub/Shower Transfer Score: 0 Tub/Shower Transfers Tub/Shower Transfer Score: 0 
  
 
2. Physical Therapy ON ADMISSION ON DISCHARGE Wheelchair Mobility/Management Able to Propel (ft): 140 feet(max A for steering) Functional Level: 2 Curbs/Ramps Assist Required (FIM Score): 0 (Not tested) Wheelchair Setup Assist Required : 2 (Maximal assistance) Wheelchair Management: Manages left brake(needing assist with brakes) Wheelchair Mobility/Management Able to Propel (ft): 220 feet Functional Level: 5 Curbs/Ramps Assist Required (FIM Score): 0 (Not tested) Wheelchair Setup Assist Required : 3 (Moderate assistance) Wheelchair Management: Manages left brake, Manages right brake Gait Amount of Assistance: 0 (Not tested) Distance (ft): 0 Feet (ft) Assistive Device: (N/A) Gait Amount of Assistance: 0 (Not tested) Distance (ft): 0 Feet (ft) Assistive Device: (N/A) Balance-Sitting/Standing Sitting - Static: Fair (occasional) Sitting - Dynamic: Fair (occasional), Occassional, Poor (constant support) Standing - Static: Poor, Constant support Standing - Dynamic : (not tested) Balance-Sitting/Standing Sitting - Static: Fair (occasional) Sitting - Dynamic: Fair (occasional) Standing - Static: Poor Standing - Dynamic : Impaired Bed/Mat Mobility Rolling Right : 2 (Maximal assistance) Rolling Left : 2 (Maximal assistance) Supine to Sit : 2 (Maximal assistance) Sit to Supine : 2 (Maximal assistance) Bed/Mat Mobility Rolling Right : 3 (Moderate assistance ) Rolling Left : 3 (Moderate assistance ) Supine to Sit : 3 (Moderate assistance) Sit to Supine : 3 (Moderate assistance) Transfers Transfer Type: Lateral with transfer board Other: also lateral w/transfer board to/from drop arm bsc, w/ min A Transfer Assistance : 1 (Total assistance)(Max A x 2 for safety, sequencing, appropriate ant trunk ) Sit to Stand Assistance: Total assistance Car Transfers: Not tested Car Type: N/A Transfers Transfer Type: Lateral with transfer board Other: w/c <> bsc w/lateral transfer board Transfer Assistance : 3 (Moderate assistance ) Sit to Stand Assistance: Total assistance Car Transfers: Moderate assistance Car Type: car txfr simulator Steps or Stairs Steps/Stairs Ambulated (#): 0 Level of Assist : 0 (Not tested) Rail Use: (N/A) Steps or Stairs Steps/Stairs Ambulated (#): 0 Level of Assist : 0 (Not tested) Rail Use: (N/A) 3. Speech and Language Pathology ON ADMISSION ON DISCHARGE Comprehension (Native Language) Primary Mode of Comprehension: Auditory Score: 6 Comments: occasional repetition needed Comprehension (Native Language) Primary Mode of Comprehension: Auditory Score: 5 Comments: occasional repetition needed Expression (Native Language) Primary Mode of Expression: Verbal 
Score: 6 Expression (Native Language) Primary Mode of Expression: Verbal 
Score: 6 Social Interaction/Pragmatics Score: 5 Comments: re-direction to task at times Social Interaction/Pragmatics Score: 5 Comments: re-direction to task at times Problem Solving Score: 4 Comments: min cues for following spinal precautions Problem Solving Score: 1 Comments: min cues for following spinal precautions Memory Score: 5 Memory Score: 6 Legend: 
 7 - Independent 6 - Modified Independent 5 - Standby Assistance / Supervision / Brenda Rodriguez 4 - Minimum Assistance / [address] 3 - Moderate Assistance 2 - Maximum Assistance 1 - Total Assistance / Dependent ACUTE MEDICAL ISSUES ADDRESSED IN INPATIENT REHABILITATION FACILITY:  
 
> S/P T10, T9, T8 laminectomy; T7, T8, T9, T10, T11, T12 posterior thoracic fusion; segmental spinal instrumentation; K2M Davis type, T7-T8, T11-T12 (12/11/2019 - Dr. Jennifer Arora); History of acute compression fractures of body of thoracic vertebrae (T9 and T10) due to fall 
 > Thoracic spinal precautions 
 > Continue: 
  > Calcium citrate 200 mg PO BID 
  > Cholecalciferol 2,000 units PO once daily 
 
> Acute Postoperative Blood Loss Anemia 
 > Anemia work-up (12/13/2019) showed serum iron 11, TIBC 215, iron % saturation 5, ferritin 146 
 > Hgb/Hct (12/17/2019, on admission) = 7.4/23.1 
 > Reticulocyte count (12/17/2019) = 2.5 
 > Hgb/Hct (12/23/2019) = 8.5/26.7  
 > Hgb/Hct (12/30/2019) = 9.8/31.4  
 > Hgb/Hct (1/4/2020) = 10.9/35.5 
 > Hgb/Hct (1/6/2020) = 10.7/34.1  
 > Hgb/Hct (1/13/2020) = 11.335.7  
 > Continue: 
  > FeSO4 325 mg PO once daily with breakfast  
  > Ascorbic Acid 250 mg PO once daily with breakfast (to enhance the absorption of the FeSO4) > Benign hypertensive heart and kidney disease with stage 3 chronic kidney disease without congestive heart failure 
 > Prior to admission, the patient stated she was on Metolazone 2.5 mg PO q Mon-Wed-Fri 
 > Metolazone was not given during the patient's hospital stay at [facility]  
 > Upon admission to the ARU, held Metolazone  
 
> Chronic obstructive pulmonary disease; Chronic respiratory failure with hypoxemia requiring supplemental oxygen (3 LPM) > Continue: 
  > Fluticasone-Vilanterol 100-25 1 puff inhaled once daily 
  > O2 inhalation at 3 LPM via nasal cannula continuous 
 
> Opioid dependence 
 > Continue Methadone 20 mg PO once daily 
 
> Type 2 diabetes mellitus with stage 3 chronic kidney disease 
 > HbA1c (12/11/2019) = 7.1 
 > On 12/22/2019: 
  > Increased Insulin glargine from 20 units to 22 units PO once daily before breakfast 
  > Started Insulin lispro 3 units SC TID AC  
 > On 12/24/2019, increased Insulin lispro from 3 units to 4 units SC TID AC  
 > On 12/25/2019, increased Insulin glargine from 22 units to 25 units PO once daily before breakfast 
 > On 1/11/2020, Increase Insulin lispro from 4 units to 6 units SC TID AC 
            > During the patient's stay at the ARU, the patient was given:   
  > Insulin glargine 25 units PO once daily before breakfast 
  > Insulin lispro 6 units SC TID AC  
  > Insulin lispro sliding scale SC TID AC only 
 > On discharge, patient's regimen was changed back to her home dosages: 
  > Insulin glargine 40 units PO once daily before breakfast 
  > Insulin lispro sliding scale SC TID AC only 
 
> Cough 
 > On 12/18/2019, started Guaifenesin  mg PO q 12 hr 
 > Continue Guaifenesin  mg PO q 12 hr 
 
> Constipation 
 > On 12/17/2019, started: 
  > Docusate sodium 100 mg PO once daily after breakfast 
  > PeriColace 2 tabs PO once daily after dinner > On 12/22/2019, started Lubiprostone 24 mcg PO once daily with breakfast 
 > On 1/9/2020, discontinued Lubiprostone 24 mcg PO once daily with breakfast 
            > During the patient's stay at the ARU, the patient was given:  
  > Docusate sodium 100 mg PO once daily after breakfast 
  > PeriColace 2 tabs PO once daily after dinner 
 
> Gastroesophageal reflux disease 
 > On 1/2/2019, Famotidine 20 mg PO BID was changed to Pantoprazole 40 mg PO once daily before breakfast 
            > During the patient's stay at the ARU, the patient was given Pantoprazole 40 mg PO once daily before breakfast 
 
> Anxiety 
 > On 1/1/2020, started Buspirone 5 mg PO TID 
            > During the patient's stay at the ARU, the patient was given Buspirone 5 mg PO TID 
 
> Analgesia 
 > On 1/14/2020, changed Percocet 10/325 1 tab PO q 4 hr PRN for pain level greater than 4/10 to Percocet 5/325 1 tab PO q 6 hr PRN for pain level greater than 4/10 
 > Continue: 
  > Acetaminophen 650 mg PO q 4 hr PRN for pain level less than 5/10 
  > Methocarbamol 500 mg PO q 8 hr 
  > Percocet 5/325 1 tab PO q 6 hr PRN for pain level greater than 4/10 MEDICATIONS ON DISCHARGE: 
 
Current Discharge Medication List  
  
START taking these medications Details  
oxyCODONE-acetaminophen (PERCOCET) 5-325 mg per tablet Take 1 Tab by mouth every six (6) hours as needed (for pain levle greater than 4/10) for up to 5 days. Max Daily Amount: 4 Tabs. Indications: pain 
Qty: 20 Tab, Refills: 0 Associated Diagnoses: Status post laminectomy with spinal fusion  
  
predniSONE (DELTASONE) 20 mg tablet Take 20 mg by mouth daily (with breakfast). Indications: sarcoidosis Qty: 30 Tab, Refills: 0 Associated Diagnoses: Sarcoidosis  
  
ascorbic acid, vitamin C, (VITAMIN C) 250 mg tablet Take 1 Tab by mouth daily (with breakfast). Qty: 30 Tab, Refills: 0 Associated Diagnoses: Acute blood loss as cause of postoperative anemia calcium citrate 200 mg (950 mg) tablet Take 1 Tab by mouth two (2) times a day. Indications: post-menopausal osteoporosis prevention 
Qty: 60 Tab, Refills: 0 Associated Diagnoses: Status post laminectomy with spinal fusion  
  
ferrous sulfate 325 mg (65 mg iron) tablet Take 1 Tab by mouth daily (with breakfast). Qty: 30 Tab, Refills: 0 Associated Diagnoses: Acute blood loss as cause of postoperative anemia  
  
guaiFENesin ER (MUCINEX) 600 mg ER tablet Take 1 Tab by mouth every twelve (12) hours for 7 days. Indications: cough Qty: 14 Tab, Refills: 0  
  
methocarbamol (ROBAXIN) 500 mg tablet Take 1 Tab by mouth every eight (8) hours for 7 days. Indications: muscle spasm 
Qty: 21 Tab, Refills: 0 Associated Diagnoses: Status post laminectomy with spinal fusion  
  
brief disposable (ADULT) misc by Does Not Apply route. Qty: 1 Package, Refills: 0 Associated Diagnoses: Urge urinary incontinence CONTINUE these medications which have CHANGED Details  
aspirin 81 mg chewable tablet Take 1 Tab by mouth daily (with breakfast). Indications: stroke prevention 
Qty: 30 Tab, Refills: 0  
  
docusate sodium (COLACE) 100 mg capsule Take 1 Cap by mouth daily (after breakfast). Indications: constipation 
Qty: 30 Cap, Refills: 0  
  
acetaminophen (TYLENOL) 325 mg tablet Take 2 Tabs by mouth every four (4) hours as needed for Pain. Indications: pain 
Qty: 60 Tab, Refills: 0 Associated Diagnoses: Status post laminectomy with spinal fusion CONTINUE these medications which have NOT CHANGED Details  
gabapentin (NEURONTIN) 300 mg capsule Take 300 mg by mouth daily. cholecalciferol (VITAMIN D3) (1000 Units /25 mcg) tablet Take 1,000 Units by mouth two (2) times a day. levothyroxine (SYNTHROID) 100 mcg tablet Take 100 mcg by mouth Daily (before breakfast). insulin glargine (LANTUS U-100 INSULIN) 100 unit/mL injection 40 Units by SubCUTAneous route Daily (before breakfast). famotidine (PEPCID) 20 mg tablet Take 20 mg by mouth nightly. Oxygen-Air Delivery Systems by Nasal route continuous. 2 LPM via NC  Indications: Hypoxemia requiring supplementary oxygen  
  
methadone (DOLOPHINE) 5 mg tablet Take 4 Tabs by mouth daily. Max Daily Amount: 20 mg. 
Qty: 10 Tab, Refills: 0 Associated Diagnoses: History of back injury  
  
polyethylene glycol (MIRALAX) 17 gram/dose powder Take 17 g by mouth daily. 1 tablespoon with 8 oz of water daily 
Qty: 289 g, Refills: 0  
  
umeclidinium-vilanterol (ANORO ELLIPTA) 62.5-25 mcg/actuation inhaler Take 1 Puff by inhalation daily. Qty: 1 Inhaler, Refills: 0 BD INSULIN SYRINGE ULTRA-FINE 1 mL 31 gauge x 5/16 syrg   
  
rosuvastatin (CRESTOR) 5 mg tablet TAKE ONE TABLET NIGHTLY Qty: 90 Tab, Refills: 3  
  
albuterol (PROAIR HFA) 90 mcg/actuation inhaler INHALE 2 PUFFS EVERY 4 HOURS AS NEEDED FOR WHEEZING Qty: 1 Inhaler, Refills: 5  
  
oxybutynin (DITROPAN) 5 mg tablet Take 5 mg by mouth two (2) times a day. allopurinol (ZYLOPRIM) 100 mg tablet Take 100 mg by mouth daily. insulin lispro (HUMALOG) 100 unit/mL injection To be given 15 min before meals: < 150 - None, 151-200 - 2 u, 201-250 - 4 u, 251-300 - 6 u, 301-350 - 8 u, 351-400 - 10 u, >400 - 12 u Qty: 1 Vial, Refills: 0 Associated Diagnoses: Type 2 diabetes mellitus with stage 3 chronic kidney disease, with long-term current use of insulin ([facility] [address] divalproex DR (DEPAKOTE) 250 mg tablet Take 250 mg by mouth nightly. Indications: BIPOLAR DISORDER IN REMISSION  
  
insulin syringe,safetyneedle 1 mL 30 gauge x 5/16\" syrg As directed Qty: 50 Each, Refills: 0  
  
traZODone (DESYREL) 100 mg tablet Take 100 mg by mouth nightly. Indications: major depressive disorder Nebulizer Accessories Kit Use with nebulizer machine Qty: 1 Kit, Refills: prn  
 Associated Diagnoses: COPD (chronic obstructive pulmonary disease) ([facility] sertraline (ZOLOFT) 50 mg tablet Take 50 mg by mouth daily. Indications: Bipolar Depression STOP taking these medications  
  
 metOLazone (ZAROXOLYN) 2.5 mg tablet Comments:  
Reason for Stopping: ARIPiprazole (ABILIFY) 5 mg tablet Comments:  
Reason for Stopping:   
   
  
Estimated Glomerular Filtration Rate: On admission, estimated GFR based on a Creatinine of 0.68 mg/dl: 
            Using CKD-EPI = 107.9 mL/min/1.73m2 
            Using MDRD = 112.4 mL/min/1.73m2 Most recent estimated GFR, based on a Creatinine of 1.20 mg/dl on 1/13/2020: 
            Using CKD-EPI = 55.7 mL/min/1.73m2 Using MDRD = 58.4 mL/min/1.73m2 DISCHARGE VITAL SIGNS: 
Visit Vitals /81 (BP 1 Location: Right arm, BP Patient Position: Sitting) Pulse 71 Temp 97.9 °F (36.6 °C) Resp 17 Ht 5' 5\" (1.651 m) Wt 78.2 kg (172 lb 6.4 oz) SpO2 98% Breastfeeding No  
BMI 28.69 kg/m² DISCHARGE PHYSICAL EXAMINATION: 
GENERAL SURVEY: Patient is awake, alert, oriented x 3, sitting comfortably on the chair, not in acute respiratory distress. HEENT: pale palpebral conjunctivae, anicteric sclerae, no nasoaural discharge, moist oral mucosa NECK: supple, no jugular venous distention, no palpable lymph nodes CHEST/LUNGS: symmetrical chest expansion, good air entry, clear breath sounds HEART: adynamic precordium, good S1 S2, no S3, regular rhythm, no murmurs ABDOMEN: obese, bowel sounds appreciated, soft, non-tender EXTREMITIES: pale nailbeds, no edema, full and equal pulses, no calf tenderness NEUROLOGICAL EXAM: The patient is awake, alert and oriented x3, able to answer questions fairly appropriately, able to follow 1 and 2 step commands.  Able to tell time from the wall clock.  Cranial nerves II-XII are grossly intact.  No gross sensory deficit.  Motor strength is 4-/5 on BUE, 2+/5 on the RLE (except for 1/5 on the right ankle), 2-/5 on the LLE (except for 2+/5 on the left knee).  
  
 Incision(s): healing well, clean, dry, and intact CONDITION ON DISCHARGE: Stable. DISPOSITION: Patient clinically improved and was discharged to home with home health physical therapy, occupational therapy and skilled nursing. The patient is temporarily homebound secondary to functional deficits due to acute compression fractures of body of thoracic vertebrae (T9 and T10) due to fall; S/P T10, T9, T8 laminectomy; T7, T8, T9, T10, T11, T12 posterior thoracic fusion; segmental spinal instrumentation; K2M Davis type, T7-T8, T11-T12 (12/11/2019 - Dr. Jennifer Arora). The patient is unable to ambulate (see above). The patient would benefit from continued skilled physical therapy in order to improve independent functional mobility within the home with use of least restrictive device. The patient would also benefit from continued skilled occupational therapy in order to improve self care and functional mobility within the home with use of least restrictive device. Short-term skilled nursing is needed for medication reconciliation and disease education. FOLLOW-UP RECOMMENDATIONS:  
Follow-up Information Follow up With Specialties Details Why Contact Info Susan Miller NP Nurse Practitioner On 1/22/2020 Patient has an appointment sheduled with NP, Juaquin Siegel on January 22, 2020 @ [address] [address] 
[phone] Case Escobar MD Orthopedic Surgery On 1/24/2020 Patient has an appointment scheduled with Dr. Sylwia Leahy on January 24, 2020 @ 10:20am. [address] [facility] [address] 
[phone] Florentin Maurice  On 2/28/2020 Patient has an appointment scheduled with Pulmonary Dr. Evelia Sharma on February 28, 2020 @ 9:15am. [address] [place], [place]. [address] 
[address] [facility] [id number] [phone] OTHER INSTRUCTIONS: 
 1. Diet.  
 > Specifications: Cardiac, diabetic consistent carb 1800 kcal, no concentrated sweets, low purine 
 > Solids (consistency): Regular  
 > Liquids (consistency): Thin 2. Activity. As tolerated. 3. Safety / fall precautions. 4. Weightbearing status. 5. Aspiration precautions. TIME SPENT ON DISCHARGE ACTIVITIES: More than 30 minutes. Signed:  Beena Cain MD 
  1/14/2020

## 2020-01-14 NOTE — PROGRESS NOTES
conducted a Follow up consultation and Spiritual Assessment for Hasmukh Molina, who is a 62 y.o.,female. The  provided the following Interventions:  Continued the relationship of care and support. Listened empathically. Offered assurance of continued prayer on patients behalf. Chart reviewed. The following outcomes were achieved:  Patient expressed gratitude for 's visit. Assessment:  There are no further spiritual or Gnosticist issues which require Spiritual Care Services interventions at this time. Plan:  Chaplains will continue to follow and will provide pastoral care on an as needed/requested basis.  recommends bedside caregivers page  on duty if patient shows signs of acute spiritual or emotional distress. Chaplain Thomas RICHARDS  [address] [facility]   [phone]

## 2020-01-14 NOTE — PROGRESS NOTES
17:35 Patient discharged home in stable condition with medical transport. Patient received all information concerning medications, appointments,and follow ups. Patient stated she understood all instructions without any difficulty.

## 2020-01-14 NOTE — PROGRESS NOTES
PHYSICAL THERAPY TREATMENT Patient: Michelet Barnett (65 y.o. female) Date: 2020 Diagnosis: Status post laminectomy with spinal fusion [Z98.1] Status post laminectomy with spinal fusion Precautions: Fall, Spinal(thoracic) Chart, physical therapy assessment, plan of care and goals were reviewed. Time In: 1020 Time Out: 1100 Patient Seen For: Wheelchair mobility;Transfer training(bed mobility) Pain: 
Pt pain was reported as  8/10 pre-treatment. Pt pain was reported as 8/10 post-treatment. Intervention: already had pain meds per nurse leslee Patient identified with name and : yes SUBJECTIVE:  
 
Pt had urine incontinence in w/c and required full UB and LB clothing change. OBJECTIVE DATA SUMMARY:  
Objective: BED/MAT MOBILITY Daily Assessment Rolling Right : 3 (Moderate assistance ) Rolling Left : 3 (Moderate assistance ) Sit to Supine : 3 (Moderate assistance) Pt performed bed mobility on left side of bed. Pt performed sit to left sidelying with mod assist for BLE. Pt required mod assist with positioning BLE and performing rolling side to side to maintain spinal precautions. TRANSFERS Daily Assessment Transfer Type: Lateral with transfer board Transfer Assistance : 3 (Moderate assistance ) Pt performed slide board txfr w/c to bed to left side with mod assist for anterior wt shift and blocking BLE and repositioning BLE for proper support. BALANCE Daily Assessment Sitting - Static: Fair (occasional) Sitting - Dynamic: Fair (occasional) WHEELCHAIR MOBILITY Daily Assessment Able to Propel (ft): 200 feet Functional Level: 5 Curbs/Ramps Assist Required (FIM Score): 0 (Not tested) Wheelchair Setup Assist Required : 3 (Moderate assistance) Wheelchair Management: Manages left brake;Manages right brake Pt propelled w/c from dining room to room using BUE and holding BLE up off floor without A.  Pt required increased time, several rest breaks and frequent v/c and encouragement to complete distance. ASSESSMENT: 
Pt continues to require mod assist for txfrs and bed mobility, along with mod assist for clothing management. Progression toward goals: 
[]      Improving appropriately and progressing toward goals [x]      Improving slowly and progressing toward goals 
[]      Not making progress toward goals and plan of care will be adjusted PLAN: 
Patient continues to benefit from skilled intervention to address the above impairments. Continue treatment per established plan of care. Discharge Recommendations:  Home Health Further Equipment Recommendations for Discharge:  wheelchair 18 inch and slide board Estimated LOS: 1/14/2020 Activity Tolerance:  
Fair Please refer to the flowsheet for vital signs taken during this treatment. After treatment:  
Patient left supine in bed with call bell in hand so w/c could be sanitized and dry. Gali Rhodes, [place] 
1/14/2020

## 2020-01-14 NOTE — HOME CARE
Received HH referral, [facility] will follow for SN,PT,OT; DME: W/C,cushion,BSC,transfer board) has been ordered by [facility]   from [employer] authorization , patient is for pending d/c tomorrow, [facility] will follow. OLIVIER SAWYER.

## 2020-01-14 NOTE — FACE TO FACE
[address]  [address] [facility], [address]    [address] TO [place] [address]    Name: Oumar Sinha Age / Sex: 62 y.o. / female   CSN: [id number] MRN: [id number]   Admit Date: 12/16/2019 Discharge Date: 1/14/2020     Primary Care Provider: Edis Coto NP      Primary Rehabilitation Diagnosis  1. Impaired Mobility and ADLs  2. S/P T10, T9, T8 laminectomy; T7, T8, T9, T10, T11, T12 posterior thoracic fusion; segmental spinal instrumentation; K2M [place] type, T7-T8, T11-T12 (12/11/2019 - Dr. Amparo Dumont)  3. History of acute compression fractures of body of thoracic vertebrae (T9 and T10) due to fall     Comorbidities   Diabetic neuropathy associated with type 2 diabetes mellitus (HCC) E11.40    Venous insufficiency I87.2    Obesity, Class I, BMI 30-34.9 E66.9    Chronic back pain M54.9, G89.29    Generalized osteoarthritis of multiple sites M15.9    Bipolar affective disorder  F31.9    Abnormally low high density lipoprotein (HDL) cholesterol with hypertriglyceridemia E78.6, P16.1    Diastolic dysfunction without heart failure I51.89    Chronic obstructive pulmonary disease (COPD)  J44.9    Hypertensive heart disease without heart failure I11.9    Depression F32.9    History of back injury Z87.828    Type 2 diabetes mellitus with diabetic neuropathy, with long-term current use of insulin  E11.40, Z79.4    Anxiety F41.9    Wears glasses Z97.3    History of hepatitis C Z86.19    Sickle cell trait D57.3    History of acute renal failure Z87.448    Hypothyroidism E03.9    Recurrent genital herpes A60.00    Sarcoidosis D86.9    Abscess of right arm L02.413    Hypoxemia requiring supplemental oxygen R09.02, Z99.81    Abnormal nuclear stress test R94.39    Cellulitis and abscess of hand L03.119, L02.519    Cellulitis and abscess of foot L03.119, L02.619    Cellulitis of arm, right L03. 113    Olecranon bursitis of right elbow M70.21    Contusion of left elbow S50. 02XA    Intravenous drug user F19.90    Right Achilles tendinitis M76.61    History of penicillin allergy Z88.0    Falls frequently R29.6    Gastroesophageal reflux disease with hiatal hernia K21.9, K44.9    Memory difficulty R41.3    Mixed connective tissue disease  M35.1    Hyperuricemia E79.0    History of vitamin D deficiency Z86.39    Urge urinary incontinence N39.41    Acute blood loss as cause of postoperative anemia D62    History of fracture due to fall Z87.81    Chronic respiratory failure with hypoxia  J96.11    Cellulitis of right forearm L03. 113    Gout M10.9    Menopause Z78.0    Long term current use of aspirin Z79.82    Opioid dependence  F11.20    Tobacco use disorder F17.200        History of the Present Illness: The patient is a 72-year-old [facility] female with multiple medical comorbidities who was admitted to [facility] on 12/11/2019 due to weakness of both lower extremities. The patient was apparently well until ~4 days prior to admission, the patient had 4 falls as she stated she lost her balance. Since then, the patient noticed her legs became weaker and weaker to the point that she cannot walk anymore. The patient was brought to the [facility] Emergency Department for further evaluation. The patient was seen and examined by Emergency Medicine ANDRE Torres).     Excerpt (Additional History) from the ED Provider Note by ANDRE Torres:  \"Stephen Sinha is a 62 y. o. female with PMHX of hypertension, sarcoidosis, COPD on chronic O2 by nasal cannula, chronic pain on methadone, gout, diabetes, CKD who presents to the emergency department C/O progressively worsening bilateral lower extremity 3 days.  Patient states she was seen at [facility] ED 3 days ago for increasing cough and congestion with sputum production x1 month.  She also complained of low back pain which is chronic for her but seem to be worsening and attributed to the cough.  She was recently prescribed steroid tapers for gout and COPD exacerbation. Pranay Degroot is also given Skelaxin 3 days ago which she has been taking without relief.  3 days ago she developed worsening bilateral lower extremity symmetric weakness, so discontinued the Skelaxin without improvement.  Today she has not been able to ambulate or bear weight at all. Pt denies lower extremity sensation changes, saddle anesthesia, bowel or bladder incontinence, abdominal pain, new injury, trauma or fall, and any other sxs or complaints. \"     X-ray of the chest (12/10/2019) showed prominent pulmonary interstitium likely at least partly due to chronic interstitial lung disease; component of interstitial infiltrate/edema difficult to completely exclude; no confluent consolidation.     X-ray of the lumbar spine (12/10/2019) showed no clearly acute findings.     CT scan of the head (12/10/2019) showed no acute intracranial hemorrhage or territorial infarct; no mass effect; unremarkable for age.     MRI of the lumbar spine (12/10/2019) showed degenerative changes most notable at L3-S1 characterized by mild disc herniations and mild to moderate facet arthropathy; L4-L5 moderate spinal canal stenosis; no significant spinal canal stenosis elsewhere; L5-S1 moderate foraminal stenoses with possible abutment of the right L5 nerve; mild L4-L5 foraminal stenoses;  L5-S1 annular fissure noted a potential source of pain; no clearly acute findings; incidental findings including suspected splenic cyst or hemangioma, probable renal cysts, and distended bladder; additional subtle finding of left paraspinous muscle edema at T10 level, noted at field-of-view edge, incompletely evaluated.     MRI of the thoracic spine (12/11/2019) showed findings that suggest acute compression fractures at T9 and T10, with severe vertebral body height loss at T9 and associated retropulsion of about 4 mm at the level of T9; this results in severe spinal canal stenosis at this level, with mild cord compression and surrounding cord edema; severe bilateral foraminal stenosis is also present at T9-T10; moderate to severe bilateral foraminal stenosis at T10-T11; moderate to severe right  foraminal stenosis at T8-T9; chronic T5 compression fracture without retropulsion; there is moderate bilateral foraminal stenosis at T5-T6; redemonstrated T2 bright mass in the spleen, likely representing splenic hemangioma; additional T2 bright lesions or favor represent splenic cysts.     MRI of the cervical spine (12/11/2019) showed mild to moderate degenerative changes, most pronounced at C6-C7 where there is moderate spinal canal stenosis and severe left foraminal stenosis; additional mild-to-moderate degenerative changes.     Spine Surgery consult (Dr. Rigoberto Morales) was called for evaluation and comanagement.     [facility] (Dr. Alex Barreto) was called for clearance for possible central pontine myelinolysis since serum sodium had gone from 120 at 5PM to 128 at 4AM. As per communication between the ED physician and Dr. Nishant Cleary, the patient had normal cranial nerve function and upper body strength and this is not compatible with CPM.     The patient was admitted under the service of the [facility] (Dr. Sandra Elliott).    Excerpt (History of the Present Illness) from the H&P by ANDRE Quintana:  \"Ms. Sinha is a 63 yo [facility] female with a past medical history of Type 2 DM, COPD, sarcoidosis on chronic steroids/on 3L NC, gout, hyperlipidemia and urinary incontinence who presented to [facility] ED for falls at home, low back pain and paraparesis. Patient states she has fallen at home 4 times since Saturday Dec 7, 2019. She \"lost her balance\". She lives at home with a friend who had to assist her up from the floor. She denied head trauma and LOC.  Ever since the falls, patient states she can not move her legs/ambulate. At baseline she is able to ambulate independently without assistance. Patient endorses \"cold for the past 1 month\" with productive cough yellow sputum. She endorses low back pain and the inability to move LEs. She denies fever, chills, chest pain, palpitations, SOB, abd pain, nausea/vomiting, bowel incontinence. \"     The patient underwent a T10, T9, T8 laminectomy; T7, T8, T9, T10, T11, T12 posterior thoracic fusion; segmental spinal instrumentation; K2M [place] type, T7-T8, T11-T12 on 12/11/2019 done by Dr. Kassandra Banerjee. The patient tolerated the procedure well with no intraoperative complications. The patient developed acute postoperative blood loss anemia but no blood transfusion was given. Patient was given 2 doses of intravenous iron.     Pulmonary Medicine consult (Dr. Conchis Cronin) was called for evaluation and comanagement.     MRI of the thoracic spine (12/16/2019) showed postsurgical change from T8 through T10 posterior decompression with hardware fusion T7-T12 for treatment of canal stenosis due to pathologic T9 compression fracture; preserved spinal alignment; similar mildly increased upper thoracic curvature due to chronic T5 compression deformity; similar mild inferior endplate compression deformity T10; patchy peripheral airspace disease dependent right lung suspected to reflect acute inflammatory process.     Pain was controlled with Percocet 5/325.     SCDs were used for DVT prophylaxis.     The patient had remained hemodynamically stable but due to the abovementioned neurologic deficit, the patient was noted to have impaired mobility and ADLs. Patient was felt to be a good candidate for acute inpatient rehabilitation. Upon evaluation by Physical Therapy and Occupational Therapy, the patient was recommended for acute inpatient rehabilitation.  The patient was discharged and was subsequently admitted to the [facility] for intensive rehabilitation to help recover strength, function and mobility.       Past Medical History:  Past Medical History:   Diagnosis Date    Abnormally low high density lipoprotein (HDL) cholesterol with hypertriglyceridemia     Lipid profile (11/6/2016) showed , , HDL 38, LDL 37    Acute blood loss as cause of postoperative anemia 12/12/2019    Anxiety     Bipolar affective disorder ([address] 12/5/2012    Cellulitis of right forearm 05/04/2017    Chronic back pain     Chronic obstructive pulmonary disease (COPD) (HCC)     SOB, on [place] O2 Recent admission with psychosis     Chronic respiratory failure with hypoxia (HCC)     On home oxygen inhalation at 3 LPM via NC    Contusion of left elbow 5/4/2017    Depression     Diabetic neuropathy associated with type 2 diabetes mellitus (HCC)     Diastolic dysfunction without heart failure     Stable on diuretics     Falls frequently     Gastroesophageal reflux disease with hiatal hernia     Generalized osteoarthritis of multiple sites     Gout     On Allopurinol    History of acute renal failure 5/31/2013    History of back injury     jumped out of second story window     History of fracture due to fall 12/11/2019    History of hepatitis C     treated    History of penicillin allergy     History of vitamin D deficiency 5/10/2017    Hypertensive heart disease without heart failure     Better controlled     Hyperuricemia 5/26/2017    Hypothyroidism     Hypoxemia requiring supplemental oxygen 12/29/2014    Intravenous drug user 5/2/2017    Long term current use of aspirin     Memory difficulty     Menopause     Mixed connective tissue disease ([address] 5/10/2017    Obesity, Class I, BMI 30-34.9     Olecranon bursitis of right elbow 5/4/2017    Opioid dependence ([address]     On Methadone    Recurrent genital herpes 5/31/2013    Right Achilles tendinitis 5/2/2017    Sarcoidosis     Sickle cell trait ([address]     Tobacco use disorder     Type 2 diabetes mellitus with diabetic neuropathy, with long-term current use of insulin ([facility])     HbA1c (2019) = 7.1    Urge urinary incontinence 5/10/2017    Venous insufficiency     Wears glasses        Past Surgical History:  Past Surgical History:   Procedure Laterality Date    HX BACK SURGERY  1986    HX  SECTION      HX CYST REMOVAL Left     Left foot    HX OTHER SURGICAL Left 2017    S/P incision and drainage of the abscess of the left hand and the left foot (2017 - Dr. Yosef [facility])    HX THORACIC LAMINECTOMY  2019    S/P T10, T9, T8 laminectomy; T7, T8, T9, T10, T11, T12 posterior thoracic fusion; segmental spinal instrumentation; K2M [place] type, T7-T8, T11-T12 (2019 - Dr. Tiarra Gray)    HX TUBAL LIGATION         Medications on Discharge:    Current Discharge Medication List      START taking these medications    Details   oxyCODONE-acetaminophen (PERCOCET) 5-325 mg per tablet Take 1 Tab by mouth every six (6) hours as needed (for pain levle greater than 4/10) for up to 5 days. Max Daily Amount: 4 Tabs. Indications: pain  Qty: 20 Tab, Refills: 0    Associated Diagnoses: Status post laminectomy with spinal fusion      predniSONE (DELTASONE) 20 mg tablet Take 20 mg by mouth daily (with breakfast). Indications: sarcoidosis  Qty: 30 Tab, Refills: 0    Associated Diagnoses: Sarcoidosis      ascorbic acid, vitamin C, (VITAMIN C) 250 mg tablet Take 1 Tab by mouth daily (with breakfast). Qty: 30 Tab, Refills: 0    Associated Diagnoses: Acute blood loss as cause of postoperative anemia      calcium citrate 200 mg (950 mg) tablet Take 1 Tab by mouth two (2) times a day. Indications: post-menopausal osteoporosis prevention  Qty: 60 Tab, Refills: 0    Associated Diagnoses: Status post laminectomy with spinal fusion      ferrous sulfate 325 mg (65 mg iron) tablet Take 1 Tab by mouth daily (with breakfast).   Qty: 30 Tab, Refills: 0    Associated Diagnoses: Acute blood loss as cause of postoperative anemia      guaiFENesin ER (MUCINEX) 600 mg ER tablet Take 1 Tab by mouth every twelve (12) hours for 7 days. Indications: cough  Qty: 14 Tab, Refills: 0      methocarbamol (ROBAXIN) 500 mg tablet Take 1 Tab by mouth every eight (8) hours for 7 days. Indications: muscle spasm  Qty: 21 Tab, Refills: 0    Associated Diagnoses: Status post laminectomy with spinal fusion      brief disposable (ADULT) misc by Does Not Apply route. Qty: 1 Package, Refills: 0    Associated Diagnoses: Urge urinary incontinence         CONTINUE these medications which have CHANGED    Details   aspirin 81 mg chewable tablet Take 1 Tab by mouth daily (with breakfast). Indications: stroke prevention  Qty: 30 Tab, Refills: 0      docusate sodium (COLACE) 100 mg capsule Take 1 Cap by mouth daily (after breakfast). Indications: constipation  Qty: 30 Cap, Refills: 0      acetaminophen (TYLENOL) 325 mg tablet Take 2 Tabs by mouth every four (4) hours as needed for Pain. Indications: pain  Qty: 60 Tab, Refills: 0    Associated Diagnoses: Status post laminectomy with spinal fusion         CONTINUE these medications which have NOT CHANGED    Details   gabapentin (NEURONTIN) 300 mg capsule Take 300 mg by mouth daily. cholecalciferol (VITAMIN D3) (1000 Units /25 mcg) tablet Take 1,000 Units by mouth two (2) times a day. levothyroxine (SYNTHROID) 100 mcg tablet Take 100 mcg by mouth Daily (before breakfast). insulin glargine (LANTUS U-100 INSULIN) 100 unit/mL injection 40 Units by SubCUTAneous route Daily (before breakfast). famotidine (PEPCID) 20 mg tablet Take 20 mg by mouth nightly. Oxygen-Air Delivery Systems by Nasal route continuous. 2 LPM via NC  Indications: Hypoxemia requiring supplementary oxygen      methadone (DOLOPHINE) 5 mg tablet Take 4 Tabs by mouth daily.  Max Daily Amount: 20 mg.  Qty: 10 Tab, Refills: 0    Associated Diagnoses: History of back injury      polyethylene glycol (MIRALAX) 17 gram/dose powder Take 17 g by mouth daily. 1 tablespoon with 8 oz of water daily  Qty: 289 g, Refills: 0      umeclidinium-vilanterol (ANORO ELLIPTA) 62.5-25 mcg/actuation inhaler Take 1 Puff by inhalation daily. Qty: 1 Inhaler, Refills: 0      BD INSULIN SYRINGE ULTRA-FINE 1 mL 31 gauge x 5/16 syrg       rosuvastatin (CRESTOR) 5 mg tablet TAKE ONE TABLET NIGHTLY  Qty: 90 Tab, Refills: 3      albuterol (PROAIR HFA) 90 mcg/actuation inhaler INHALE 2 PUFFS EVERY 4 HOURS AS NEEDED FOR WHEEZING  Qty: 1 Inhaler, Refills: 5      oxybutynin (DITROPAN) 5 mg tablet Take 5 mg by mouth two (2) times a day. allopurinol (ZYLOPRIM) 100 mg tablet Take 100 mg by mouth daily. insulin lispro (HUMALOG) 100 unit/mL injection To be given 15 min before meals: < 150 - None, 151-200 - 2 u, 201-250 - 4 u, 251-300 - 6 u, 301-350 - 8 u, 351-400 - 10 u, >400 - 12 u  Qty: 1 Vial, Refills: 0    Associated Diagnoses: Type 2 diabetes mellitus with stage 3 chronic kidney disease, with long-term current use of insulin ([facility])      divalproex DR (DEPAKOTE) 250 mg tablet Take 250 mg by mouth nightly. Indications: BIPOLAR DISORDER IN REMISSION      insulin syringe,safetyneedle 1 mL 30 gauge x 5/16\" syrg As directed  Qty: 50 Each, Refills: 0      traZODone (DESYREL) 100 mg tablet Take 100 mg by mouth nightly. Indications: major depressive disorder      Nebulizer Accessories Kit Use with nebulizer machine  Qty: 1 Kit, Refills: prn    Associated Diagnoses: COPD (chronic obstructive pulmonary disease) ([facility])      sertraline (ZOLOFT) 50 mg tablet Take 50 mg by mouth daily. Indications: Bipolar Depression         STOP taking these medications       metOLazone (ZAROXOLYN) 2.5 mg tablet Comments:   Reason for Stopping:         ARIPiprazole (ABILIFY) 5 mg tablet Comments:   Reason for Stopping:               Condition on Discharge: Stable.     Ambulation Gait  Amount of Assistance: 0 (Not tested)  Distance (ft): 0 Feet (ft)  Assistive Device: (N/A)     Wheelchair Mobility Wheelchair Mobility/Management  Able to Propel (ft): 220 feet  Functional Level: 5  Curbs/Ramps Assist Required (FIM Score): 0 (Not tested)  Wheelchair Setup Assist Required : 3 (Moderate assistance)  Wheelchair Management: Manages left brake, Manages right brake         Disposition: Patient clinically improved and was discharged to home with home health physical therapy, occupational therapy and skilled nursing. The patient is temporarily homebound secondary to functional deficits due to acute compression fractures of body of thoracic vertebrae (T9 and T10) due to fall; S/P T10, T9, T8 laminectomy; T7, T8, T9, T10, T11, T12 posterior thoracic fusion; segmental spinal instrumentation; K2M [place] type, T7-T8, T11-T12 (12/11/2019 - Dr. Americo Page). The patient is unable to ambulate (see above). The patient would benefit from continued skilled physical therapy in order to improve independent functional mobility within the home with use of least restrictive device. The patient would also benefit from continued skilled occupational therapy in order to improve self care and functional mobility within the home with use of least restrictive device. Short-term skilled nursing is needed for medication reconciliation and disease education. Due to the abovementioned data, I certify that the patient needs intermittent Skilled Nursing, Physical Therapy and Occupational Therapy. I will NOT be following this patient in the Community and Gallito Donohue NP / Jesica Jordan MD will be responsible for signing the [facility] 133 of Care. In compliance with the Affordable Care Act, I certify that this patient was managed by me during this hospitalization and that I had a Face-to-Face Encounter that meets the physician Face-to-Face Encounter requirements.       Signed:    Gilda Garcia MD    January 13, 2020

## 2020-01-14 NOTE — PROGRESS NOTES
SHIFT CHANGE NOTE FOR [place] Bedside and Verbal shift change report given to [facility] RN(oncoming nurse) by Matti Zaldivar LPN (offgoing nurse). Report included the following information SBAR, Kardex, MAR and Recent Results. Situation: 
 Code Status: Full Code Reason for Admission: Spinal Thoracic laminectomy T 6-T 11 Hospital Day: 34 Problem List:  
Hospital Problems  Date Reviewed: 1/14/2020 Codes Class Noted POA Chronic respiratory failure with hypoxia (HCC) (Chronic) ICD-10-CM: J96.11 
ICD-9-CM: 518.83, 799.02  Unknown Yes Overview Signed 12/16/2019 10:11 PM by Blas Zee MD  
  On home oxygen inhalation at 3 LPM via NC Opioid dependence (HCC) (Chronic) ICD-10-CM: Z43.76 ICD-9-CM: 304.00  Unknown Yes Overview Signed 12/16/2019 10:54 PM by Blas Zee MD  
  On Methadone Acute blood loss as cause of postoperative anemia ICD-10-CM: D62 
ICD-9-CM: 285.1  12/12/2019 Yes Impaired mobility and ADLs ICD-10-CM: Z74.09 
ICD-9-CM: 799.89  12/11/2019 Yes Spinal cord compression (HCC) ICD-10-CM: G95.20 ICD-9-CM: 336.9  12/11/2019 Yes Compression fracture of body of thoracic vertebra (HCC) ICD-10-CM: S22.000A ICD-9-CM: 805.2  12/11/2019 Yes * (Principal) Status post laminectomy with spinal fusion ICD-10-CM: Z98.1 ICD-9-CM: V45.4  12/11/2019 Yes Overview Signed 12/16/2019 10:05 PM by Blas Zee MD  
  S/P T10, T9, T8 laminectomy; T7, T8, T9, T10, T11, T12 posterior thoracic fusion; segmental spinal instrumentation; K2M [place] type, T7-T8, T11-T12 (12/11/2019 - Dr. Vinny Penn) History of fracture due to fall ICD-10-CM: Z87.81 ICD-9-CM: V15.51  12/11/2019 Yes Hypoxemia requiring supplemental oxygen (Chronic) ICD-10-CM: R09.02, Z99.81 ICD-9-CM: 799.02  12/29/2014 Yes Chronic obstructive pulmonary disease (COPD) (HCC) (Chronic) ICD-10-CM: J44.9 ICD-9-CM: 164  Unknown Yes Overview Signed 4/23/2013 10:01 AM by Sindi CALLES  
  SOB, on [place] O2 Recent admission with psychosis Hypertensive heart disease without heart failure (Chronic) ICD-10-CM: I11.9 ICD-9-CM: 402.90  Unknown Yes Overview Signed 4/23/2013 10:02 AM by Tala Escobar Better controlled Type 2 diabetes mellitus with diabetic neuropathy, with long-term current use of insulin (HCC) (Chronic) ICD-10-CM: E11.40, Z79.4 ICD-9-CM: 250.60, 357.2, V58.67  Unknown Yes Overview Signed 12/18/2019  2:13 PM by Yuval Lawrence MD  
  HbA1c (12/11/2019) = 7.1 Background: 
 Past Medical History:  
Past Medical History:  
Diagnosis Date  Abnormally low high density lipoprotein (HDL) cholesterol with hypertriglyceridemia Lipid profile (11/6/2016) showed , , HDL 38, LDL 37  Acute blood loss as cause of postoperative anemia 12/12/2019  Anxiety  Bipolar affective disorder ([address] 12/5/2012  Cellulitis of right forearm 05/04/2017  Chronic back pain  Chronic obstructive pulmonary disease (COPD) ([address] SOB, on [place] O2 Recent admission with psychosis  Chronic respiratory failure with hypoxia ([address] On home oxygen inhalation at 3 LPM via NC  
 Contusion of left elbow 5/4/2017  Depression  Diabetic neuropathy associated with type 2 diabetes mellitus ([address]  Diastolic dysfunction without heart failure Stable on diuretics  Falls frequently  Gastroesophageal reflux disease with hiatal hernia  Generalized osteoarthritis of multiple sites  Gout On Allopurinol  History of acute renal failure 5/31/2013  History of back injury   
 jumped out of second story window  History of fracture due to fall 12/11/2019  
 History of hepatitis C   
 treated  History of penicillin allergy  History of vitamin D deficiency 5/10/2017  Hypertensive heart disease without heart failure Better controlled  Hyperuricemia 5/26/2017  Hypothyroidism  Hypoxemia requiring supplemental oxygen 12/29/2014  Intravenous drug user 5/2/2017  Long term current use of aspirin  Memory difficulty  Menopause  Mixed connective tissue disease ([facility] 5/10/2017  Obesity, Class I, BMI 30-34.9  Olecranon bursitis of right elbow 5/4/2017  Opioid dependence ([facility] On Methadone  Recurrent genital herpes 5/31/2013  Right Achilles tendinitis 5/2/2017  Sarcoidosis  Sickle cell trait ([facility] [facility]  Tobacco use disorder  Type 2 diabetes mellitus with diabetic neuropathy, with long-term current use of insulin ([facility] [facility] HbA1c (12/11/2019) = 7.1  Urge urinary incontinence 5/10/2017  Venous insufficiency  Wears glasses Patient taking anticoagulants no Patient has a defibrillator: no  
 
Assessment: 
? Changes in Assessment throughout shift: None ? Patient has central line: no Reasons if yes: Removed 12/16/19 Insertion date:n/a Last dressing date:n/a 
? Patient has Renae Cath: no Reasons if yes: n/a Insertion date:n/a 
 
? Last Vitals: 
  
Vitals:  
 01/13/20 1555 01/13/20 2205 01/14/20 0818 01/14/20 1553 BP: 139/75 120/64 159/81 143/83 Pulse: 82 72 71 72 Resp: 17 16 17 18 Temp: 98.3 °F (36.8 °C) 97 °F (36.1 °C) 97.9 °F (36.6 °C) 97.9 °F (36.6 °C) SpO2: 95% 95% 98% 94% Weight:      
Height:      
LMP: 11/25/2012  
 
 
? PAIN Pain Assessment Pain Intensity 1: 0 (01/14/20 1600) Pain Intensity 1: 2 (12/29/14 1105) Pain Location 1: Back Pain Location 1: Abdomen Pain Intervention(s) 1: Medication (see MAR) Pain Intervention(s) 1: Medication (see MAR) Patient Stated Pain Goal: 0 Patient Stated Pain Goal: 0 
o Intervention effective: yes   
o Other actions taken for pain: no c/o 
 
? Skin Assessment Skin color Skin Color: Appropriate for ethnicity Condition/Temperature Skin Condition/Temp: Warm Integrity Skin Integrity: Incision (comment), Intact Turgor Turgor: Non-tenting Weekly Pressure Ulcer Documentation  Pressure  Injury Documentation: No Pressure Injury Noted-Pressure Ulcer Prevention Initiated Wound Prevention & Protection Methods Orientation of wound Orientation of Wound Prevention: Posterior Location of Prevention Location of Wound Prevention: Buttocks, Sacrum/Coccyx Dressing Present Dressing Present : No 
Dressing Status Dressing Status: Intact Wound Offloading Wound Offloading (Prevention Methods): Bed, pressure redistribution/air, Chair cushion, Pillows, Repositioning ? INTAKE/OUPUT Date 01/13/20 0700 - 01/14/20 3778 01/14/20 0700 - 01/15/20 7526 Shift 0700-1859 1900-0659 24 Hour Total 7924-1665 4072-4449 24 Hour Total  
INTAKE Shift Total(mL/kg) OUTPUT Urine(mL/kg/hr) Urine Occurrence(s) 3 x 3 x 6 x 4 x  4 x Stool Stool Occurrence(s) 1 x 0 x 1 x 0 x  0 x Shift Total(mL/kg) NET Weight (kg) 78.2 78.2 78.2 78.2 78.2 78.2 Recommendations: 1. Patient needs and requests: met 2. Diet: Cardiac DM Reg [place] Melissa liq 3. Pending tests/procedures: none 4. Functional Level/Equipment: wheelchair 5. Estimated Discharge Date: TBD Posted on Whiteboard in Patients Room: yes HEALS Safety Check A safety check occurred in the patient's room between off going nurse and oncoming nurse listed above. The safety check included the below items Area Items H High Alert Medications ? Verify all high alert medication drips (heparin, PCA, etc.) E Equipment ? Suction is set up for ALL patients (with yanker) ? Red plugs utilized for all equipment (IV pumps, etc.) ? WOWs wiped down at end of shift. ? Room stocked with oxygen, suction, and other unit-specific supplies A Alarms ? Bed alarm is set for fall risk patients ? Ensure chair alarm is in place and activated if patient is up in a chair L Lines ? Check IV for any infiltration ? Renae bag is empty if patient has a Renae ? Tubing and IV bags are labeled Opal Duemichael Safety ? Room is clean, patient is clean, and equipment is clean. ? Hallways are clear from equipment besides carts. ? Fall bracelet on for fall risk patients ? Ensure room is clear and free of clutter ? Suction is set up for ALL patients (with lashay) ? Hallways are clear from equipment besides carts. ? Isolation precautions followed, supplies available outside room, sign posted

## 2020-01-15 NOTE — HOME CARE
Patient discharged yesterday evening 1/14/20  , [facility] referral processed this morning, [facility] will follow for SN,PT,OT;  per ARU social workers note \" Daughter to  bedside commode and transfer board at [facility] today. Daughter has borrowed wheelchair until [employer] approves wheelchair from [facility]. Once approved [facility] will deliver the wheelchair to the home. \", [facility] will follow, [facility] referral processed to [facility] central intake. OLIVIER SAWYER.

## 2020-01-17 PROBLEM — A41.9 SEPSIS (HCC): Status: ACTIVE | Noted: 2020-01-01

## 2020-01-17 NOTE — ED TRIAGE NOTES
Pt arrives to ER via EMS for altered mental status. Pt is 1 month s/p laminectomy with spinal fusion. Pt appears drowsy and lethargic.

## 2020-01-18 NOTE — PROGRESS NOTES
Pt seen & evaluated in ER - admitted this morning for SOB , possible PNA. Recently had laminectomy done by Dr Lulú Sherwood - WBC 16.1 --> 12.1 and discharged to [facility]. Plan -  
CAP - Started on IV Abx Thrombocytopenia -was present on her previous admission also H/o sarcoidosis - continue prednisone H/o COPD -continue bronchodilators History of type 2 diabetes - continue SSi & lantus History of chronic respiratory failure on 3 L nasal cannula at home History of hypothyroidism - continue synthroid History of hyperlipidemia - continue crestor History of past heroin and cocaine abuse Further per H&P, currently pending. We will follow

## 2020-01-18 NOTE — CONSULTS
Consult Patient: Sandi Amos MRN: [id number]  SSN: [id number] YOB: 1955  Age: 62 y.o. Sex: female SUBJECTIVE Sandi Amos is a 62 y.o. female who is being seen for : follow up assessment of her back. She has returned to back here to [address] and presented with confusion x 1 day. She was an inpatient at [address] from 12/16/19 to recent DC date of 1/14/2020. She has been home  since her DC and she lives alone. I saw her this AM (6:45AM 1/18/2020). She is alert, follows commands, know her location as [facility]. She is in NAD. Back incision is healed: not redness/tenderness or fluctuance. Her ED diagnostic w/u: 1. CXR: 1/17/2020: Emphysema with scarring. Probable pulmonary vascular congestion. 2. Blood Cultures: 21:02 of 1/17/2020: Negative to date 3. NL UA of 1/17/2020   20:49 sample 4.  1/17/2020  9:30 PM - Jose, Lab In [facility]  
 
Component Value Ref Range & Units Status Influenza A Antigen NEGATIVE   NEG   Final  
 
A negative result does not exclude influenza virus infection, seasonal or H1N1 due to suboptimal sensitivity. If influenza is circulating in your community, a diagnosis of influenza should be considered based on a patients clinical presentation and empiric antiviral treatment should be considered, if indicated. Influenza B Antigen NEGATIVE   NEG   Final  
 
5. Lactic Acid: 19:38 of 1/17/202:  NL at  1.29 
 
6. WBC: 16.1 with 76 PMNL  19:35   Of 1/17/2020 7. ESR:  8   on (1/17/2020) at 9:29PM 
 
8. CRP: elevated at 6.1  On (1/17/2020) at 9:29PM 
 
 
She admits to h/o drug use in the past, but to no recent drug use however. Her last Methadone tablets consumption was 2 days ago she tells me. Her ED presentation from yesterday is as below: 
 
61 y/o F with hx of COPD, laminectomy in Dec 2019, DM2 presenting to ED with confusion x1 day.  Patient lives at home with family who called EMS today due to patient having confusion since this AM. Patient reports generalized weakness and fatigue today, cough that developed today. She denies any other new sxs. She was just recently discharged from a rehab facility where she was recovering from her spinal surgery. She complains of back pain but this is unchanged from previous chronic back pain. Her DC Dx from  Rehab on 1/14/2020 were: 1. Impaired Mobility and ADLs 2. S/P T10, T9, T8 laminectomy; T7, T8, T9, T10, T11, T12 posterior thoracic fusion; segmental spinal instrumentation; K2M [place] type, T7-T8, T11-T12 (12/11/2019 - Dr. Frandy Das) 3. History of acute compression fractures of body of thoracic vertebrae (T9 and T10) due to fall 
  
 
Past Medical History:  
Diagnosis Date  Abnormally low high density lipoprotein (HDL) cholesterol with hypertriglyceridemia Lipid profile (11/6/2016) showed , , HDL 38, LDL 37  Acute blood loss as cause of postoperative anemia 12/12/2019  Anxiety  Bipolar affective disorder ([address] 12/5/2012  Cellulitis of right forearm 05/04/2017  Chronic back pain  Chronic obstructive pulmonary disease (COPD) ([address] SOB, on [place] O2 Recent admission with psychosis  Chronic respiratory failure with hypoxia ([address] On home oxygen inhalation at 3 LPM via NC  
 Contusion of left elbow 5/4/2017  Depression  Diabetic neuropathy associated with type 2 diabetes mellitus ([address]  Diastolic dysfunction without heart failure Stable on diuretics  Falls frequently  Gastroesophageal reflux disease with hiatal hernia  Generalized osteoarthritis of multiple sites  Gout On Allopurinol  History of acute renal failure 5/31/2013  History of back injury   
 jumped out of second story window  History of fracture due to fall 12/11/2019  
 History of hepatitis C   
 treated  History of penicillin allergy  History of vitamin D deficiency 5/10/2017  Hypertensive heart disease without heart failure Better controlled  Hyperuricemia 2017  Hypothyroidism  Hypoxemia requiring supplemental oxygen 2014  Intravenous drug user 2017  Long term current use of aspirin  Memory difficulty  Menopause  Mixed connective tissue disease ([facility] 5/10/2017  Obesity, Class I, BMI 30-34.9  Olecranon bursitis of right elbow 2017  Opioid dependence ([facility] On Methadone  Recurrent genital herpes 2013  Right Achilles tendinitis 2017  Sarcoidosis  Sickle cell trait ([facility] [facility]  Tobacco use disorder  Type 2 diabetes mellitus with diabetic neuropathy, with long-term current use of insulin ([facility] [address] [facility] HbA1c (2019) = 7.1  Urge urinary incontinence 5/10/2017  Venous insufficiency  Wears glasses Past Surgical History:  
Procedure [facility]  HX  SECTION    
 HX CYST REMOVAL Left 1979 Left foot  HX OTHER SURGICAL Left 2017 S/P incision and drainage of the abscess of the left hand and the left foot (2017 - Dr. Jose Daniel Acevedo. Brandon Rapp)  HX THORACIC LAMINECTOMY  2019 S/P T10, T9, T8 laminectomy; T7, T8, T9, T10, T11, T12 posterior thoracic fusion; segmental spinal instrumentation; K2M [place] type, T7-T8, T11-T12 (2019 - Dr. Lakhwinder Valdes)  HX TUBAL LIGATION Family History Problem Relation Age of Onset  Seizures Other  Diabetes Mother  Hypertension Mother  Heart Disease Mother  Cancer Mother  Diabetes Father  Stroke Father  Heart Disease Father  Headache Sister  Depression Neg Hx  Suicide Neg Hx  Psychotic Disorder Neg Hx  Substance Abuse Neg Hx  Dementia Neg Hx Social History Tobacco Use  Smoking status: Current Every Day Smoker Packs/day: 1.00 Years: 30.00 Pack years: 30.00 Types: Cigarettes Start date: 1969  Smokeless tobacco: Never Used Substance Use Topics  Alcohol use: No  
  
Current Facility-Administered Medications Medication Dose Route Frequency Provider Last Rate Last Dose  sodium chloride (NS) flush 5-10 mL  5-10 mL IntraVENous PRN Lizzette Fatima,       
 sterile water (preservative free) injection  vancomycin (VANCOCIN) 1,000 mg in 0.9% sodium chloride (MBP/ADV) 250 mL adv  1,000 mg IntraVENous Q12H Sindy Madsen, DO      
 
Current Outpatient Medications Medication Sig Dispense Refill  oxyCODONE-acetaminophen (PERCOCET) 5-325 mg per tablet Take 1 Tab by mouth every six (6) hours as needed (for pain levle greater than 4/10) for up to 5 days. Max Daily Amount: 4 Tabs. Indications: pain 20 Tab 0  
 aspirin 81 mg chewable tablet Take 1 Tab by mouth daily (with breakfast). Indications: stroke prevention 30 Tab 0  
 docusate sodium (COLACE) 100 mg capsule Take 1 Cap by mouth daily (after breakfast). Indications: constipation 30 Cap 0  predniSONE (DELTASONE) 20 mg tablet Take 20 mg by mouth daily (with breakfast). Indications: sarcoidosis 30 Tab 0  
 ascorbic acid, vitamin C, (VITAMIN C) 250 mg tablet Take 1 Tab by mouth daily (with breakfast). 30 Tab 0  
 calcium citrate 200 mg (950 mg) tablet Take 1 Tab by mouth two (2) times a day. Indications: post-menopausal osteoporosis prevention 60 Tab 0  
 ferrous sulfate 325 mg (65 mg iron) tablet Take 1 Tab by mouth daily (with breakfast). 30 Tab 0  
 guaiFENesin ER (MUCINEX) 600 mg ER tablet Take 1 Tab by mouth every twelve (12) hours for 7 days. Indications: cough 14 Tab 0  
 methocarbamol (ROBAXIN) 500 mg tablet Take 1 Tab by mouth every eight (8) hours for 7 days. Indications: muscle spasm 21 Tab 0  
 acetaminophen (TYLENOL) 325 mg tablet Take 2 Tabs by mouth every four (4) hours as needed for Pain. Indications: pain 60 Tab 0  
 brief disposable (ADULT) misc by Does Not Apply route.  1 Package 0  
  methadone (DOLOPHINE) 5 mg tablet Take 4 Tabs by mouth daily. Max Daily Amount: 20 mg. 10 Tab 0  
 polyethylene glycol (MIRALAX) 17 gram/dose powder Take 17 g by mouth daily. 1 tablespoon with 8 oz of water daily 289 g 0  
 rosuvastatin (CRESTOR) 5 mg tablet TAKE ONE TABLET NIGHTLY 90 Tab 3  
 albuterol (PROAIR HFA) 90 mcg/actuation inhaler INHALE 2 PUFFS EVERY 4 HOURS AS NEEDED FOR WHEEZING 1 Inhaler 5  
 insulin lispro (HUMALOG) 100 unit/mL injection To be given 15 min before meals: < 150 - None, 151-200 - 2 u, 201-250 - 4 u, 251-300 - 6 u, 301-350 - 8 u, 351-400 - 10 u, >400 - 12 u 1 Vial 0  
 insulin syringe,safetyneedle 1 mL 30 gauge x 5/16\" syrg As directed 50 Each 0  
 Nebulizer Accessories Kit Use with nebulizer machine 1 Kit prn  allopurinoL (ZYLOPRIM) 100 mg tablet Take 100 mg by mouth daily.  gabapentin (NEURONTIN) 300 mg capsule Take 300 mg by mouth daily.  cholecalciferol (VITAMIN D3) (1000 Units /25 mcg) tablet Take 1,000 Units by mouth two (2) times a day.  levothyroxine (SYNTHROID) 100 mcg tablet Take 100 mcg by mouth Daily (before breakfast).  insulin glargine (LANTUS U-100 INSULIN) 100 unit/mL injection 40 Units by SubCUTAneous route Daily (before breakfast).  famotidine (PEPCID) 20 mg tablet Take 20 mg by mouth nightly.  umeclidinium-vilanterol (ANORO ELLIPTA) 62.5-25 mcg/actuation inhaler Take 1 Puff by inhalation daily. 1 Inhaler 0  
 BD INSULIN SYRINGE ULTRA-FINE 1 mL 31 gauge x 5/16 syrg  oxybutynin (DITROPAN) 5 mg tablet Take 5 mg by mouth two (2) times a day.  allopurinol (ZYLOPRIM) 100 mg tablet Take 100 mg by mouth daily.  divalproex DR (DEPAKOTE) 250 mg tablet Take 250 mg by mouth nightly. Indications: BIPOLAR DISORDER IN REMISSION  traZODone (DESYREL) 100 mg tablet Take 100 mg by mouth nightly. Indications: major depressive disorder  sertraline (ZOLOFT) 50 mg tablet Take 50 mg by mouth daily.  Indications: Bipolar Depression  Oxygen-Air Delivery Systems by Nasal route continuous. 2 LPM via NC  Indications: Hypoxemia requiring supplementary oxygen Allergies Allergen Reactions  Moxifloxacin Rash  Pcn [Penicillins] Swelling  Sulfa (Sulfonamide Antibiotics) Swelling Review of Systems: 
  
 
I am not able to complete the review of systems because: 
 
  The patient is intubated and sedated  
  The patient has altered mental status due to his acute medical problems  
  The patient has baseline aphasia from prior stroke(s)  
  The patient has baseline dementia and is not reliable historian  
  The patient is in acute medical distress and unable to provide information  
++++ Stephen JENNIFER Roots 
 is alert, flows commands, but is arousable, but falls asleep readily.   
  
 
 
OBJECTIVE DATA Vitals:  
 01/18/20 0315 01/18/20 0400 01/18/20 0500 01/18/20 0600 BP:  110/63 124/65 131/59 Pulse: 83 84 96 (!) 104 Resp: 23 19 15 23 Temp:      
SpO2:  100% 100% 98% Weight: On examination 1/18/2020 , the patient is alert, oriented and follows commands. she is in no acute distress. She falls asleep readily dusting the examination, but is appropriate when I asked her questions. Visit Vitals /59 Pulse (!) 104 Temp 99.7 °F (37.6 °C) Resp 23 Wt 168 lb 11.2 oz (76.5 kg) SpO2 98% BMI 28.07 kg/m² NEURO EXAM :  
 Weak ankle DF/PF No abnormal hand/wrist, foot/ankle, or facial/neck tremors. Sensation intact to legs to light touch: SILT MOTOR: EXTREMITIES 
 weakness noted: strength is 1/5  To all and each (knee flexion, knee extension, plantar flexion and plantar dorsiflexion bilateral 
  SENSORY:  SILT to legs SPINE:  Healed incision. No fluctuance or drainage LABORATORY DATA  
 
  
CMP:  
Lab Results Component Value Date/Time   01/18/2020 05:56 AM  
 K 3.9 01/18/2020 05:56 AM  
  01/18/2020 05:56 AM  
 CO2 30 01/18/2020 05:56 AM  
 AGAP 5 01/18/2020 05:56 AM  
 GLU 91 01/18/2020 05:56 AM  
 BUN 13 01/18/2020 05:56 AM  
 CREA 0.83 01/18/2020 05:56 AM  
 GFRAA >60 01/18/2020 05:56 AM  
 GFRNA >60 01/18/2020 05:56 AM  
 CA 8.8 01/18/2020 05:56 AM  
 ALB 3.6 01/17/2020 07:35 PM  
 TP 8.2 01/17/2020 07:35 PM  
 GLOB 4.6 (H) 01/17/2020 07:35 PM  
 AGRAT 0.8 01/17/2020 07:35 PM  
 SGOT 15 01/17/2020 07:35 PM  
 ALT 26 01/17/2020 07:35 PM  
 
CBC:  
Lab Results Component Value Date/Time WBC 16.1 (H) 01/17/2020 07:35 PM  
 HGB 14.8 01/17/2020 07:35 PM  
 HCT 43.8 01/17/2020 07:35 PM  
 PLT 99 (L) 01/17/2020 07:35 PM  
 
COAGS: No results found for: APTT, PTP, INR, INREXT, INREXT Lab Results Component Value Date/Time Culture result: NO GROWTH 4 DAYS 12/11/2019 12:36 PM  
 Culture result: NO GROWTH 4 DAYS 12/11/2019 12:35 PM  
 Culture result: NO GROWTH 6 DAYS 12/11/2019 07:20 AM  
 Culture result: NO GROWTH 6 DAYS 12/11/2019 07:05 AM  
 Culture result: NO GROWTH 6 DAYS 05/13/2019 06:02 PM  
 
 
Cultures, Micro:  pending IMAGING  
 
 
IMPRESSION/ASSESSMENT Hospital Problems  Date Reviewed: 1/14/2020 Codes Class Noted POA Sepsis ([facility] ICD-10-CM: A41.9 ICD-9-CM: 038.9, 995.91  1/17/2020 Unknown Sourav Zhong is her at [facility] with AMS from her baseline: sepsis/u in progress PLAN 1. Admit to medicine : follow up blood work pending/drug screen pending 2. Dr Avani Ward to see Sourav Zhong later Alyson Perry MD 
1/18/2020 
7:20 AM

## 2020-01-18 NOTE — ED PROVIDER NOTES
EMERGENCY DEPARTMENT HISTORY AND PHYSICAL EXAM 
 
7:02 PM 
 
 
Date: 1/17/2020 Patient Name: Jamison Lawson History of Presenting Illness Chief Complaint Patient presents with  Altered mental status History Provided By: Patient and EMS Location/Duration/Severity/Modifying factors HPI 
63 y/o F with hx of COPD, laminectomy in Dec2019, DM2 presenting to ED with confusion x1 day. Patient lives at home with family who called EMS today due to patient having confusion since this AM. Patient reports generalized weakness and fatigue today, cough that developed today. She denies any other new sxs. She was just recently discharged from a rehab facility where she was recovering from her spinal surgery. She complains of back pain but this is unchanged from previous chronic back pain. PCP: Rosetta Ellison NP Current Facility-Administered Medications Medication Dose Route Frequency Provider Last Rate Last Dose  sodium chloride (NS) flush 5-10 mL  5-10 mL IntraVENous PRN Fredy Brunner,       
 sterile water (preservative free) injection  [START ON 1/18/2020] vancomycin (VANCOCIN) 1,000 mg in 0.9% sodium chloride (MBP/ADV) 250 mL adv  1,000 mg IntraVENous Q12H Alcus Saucer, DO      
 
Current Outpatient Medications Medication Sig Dispense Refill  oxyCODONE-acetaminophen (PERCOCET) 5-325 mg per tablet Take 1 Tab by mouth every six (6) hours as needed (for pain levle greater than 4/10) for up to 5 days. Max Daily Amount: 4 Tabs. Indications: pain 20 Tab 0  
 aspirin 81 mg chewable tablet Take 1 Tab by mouth daily (with breakfast). Indications: stroke prevention 30 Tab 0  
 docusate sodium (COLACE) 100 mg capsule Take 1 Cap by mouth daily (after breakfast). Indications: constipation 30 Cap 0  predniSONE (DELTASONE) 20 mg tablet Take 20 mg by mouth daily (with breakfast).  Indications: sarcoidosis 30 Tab 0  
  ascorbic acid, vitamin C, (VITAMIN C) 250 mg tablet Take 1 Tab by mouth daily (with breakfast). 30 Tab 0  
 calcium citrate 200 mg (950 mg) tablet Take 1 Tab by mouth two (2) times a day. Indications: post-menopausal osteoporosis prevention 60 Tab 0  
 ferrous sulfate 325 mg (65 mg iron) tablet Take 1 Tab by mouth daily (with breakfast). 30 Tab 0  
 guaiFENesin ER (MUCINEX) 600 mg ER tablet Take 1 Tab by mouth every twelve (12) hours for 7 days. Indications: cough 14 Tab 0  
 methocarbamol (ROBAXIN) 500 mg tablet Take 1 Tab by mouth every eight (8) hours for 7 days. Indications: muscle spasm 21 Tab 0  
 acetaminophen (TYLENOL) 325 mg tablet Take 2 Tabs by mouth every four (4) hours as needed for Pain. Indications: pain 60 Tab 0  
 brief disposable (ADULT) misc by Does Not Apply route. 1 Package 0  
 methadone (DOLOPHINE) 5 mg tablet Take 4 Tabs by mouth daily. Max Daily Amount: 20 mg. 10 Tab 0  
 polyethylene glycol (MIRALAX) 17 gram/dose powder Take 17 g by mouth daily. 1 tablespoon with 8 oz of water daily 289 g 0  
 rosuvastatin (CRESTOR) 5 mg tablet TAKE ONE TABLET NIGHTLY 90 Tab 3  
 albuterol (PROAIR HFA) 90 mcg/actuation inhaler INHALE 2 PUFFS EVERY 4 HOURS AS NEEDED FOR WHEEZING 1 Inhaler 5  
 insulin lispro (HUMALOG) 100 unit/mL injection To be given 15 min before meals: < 150 - None, 151-200 - 2 u, 201-250 - 4 u, 251-300 - 6 u, 301-350 - 8 u, 351-400 - 10 u, >400 - 12 u 1 Vial 0  
 insulin syringe,safetyneedle 1 mL 30 gauge x 5/16\" syrg As directed 50 Each 0  
 Nebulizer Accessories Kit Use with nebulizer machine 1 Kit prn  allopurinoL (ZYLOPRIM) 100 mg tablet Take 100 mg by mouth daily.  gabapentin (NEURONTIN) 300 mg capsule Take 300 mg by mouth daily.  cholecalciferol (VITAMIN D3) (1000 Units /25 mcg) tablet Take 1,000 Units by mouth two (2) times a day.  levothyroxine (SYNTHROID) 100 mcg tablet Take 100 mcg by mouth Daily (before breakfast).  insulin glargine (LANTUS U-100 INSULIN) 100 unit/mL injection 40 Units by SubCUTAneous route Daily (before breakfast).  famotidine (PEPCID) 20 mg tablet Take 20 mg by mouth nightly.  umeclidinium-vilanterol (ANORO ELLIPTA) 62.5-25 mcg/actuation inhaler Take 1 Puff by inhalation daily. 1 Inhaler 0  
 BD INSULIN SYRINGE ULTRA-FINE 1 mL 31 gauge x 5/16 syrg  oxybutynin (DITROPAN) 5 mg tablet Take 5 mg by mouth two (2) times a day.  allopurinol (ZYLOPRIM) 100 mg tablet Take 100 mg by mouth daily.  divalproex DR (DEPAKOTE) 250 mg tablet Take 250 mg by mouth nightly. Indications: BIPOLAR DISORDER IN REMISSION  traZODone (DESYREL) 100 mg tablet Take 100 mg by mouth nightly. Indications: major depressive disorder  sertraline (ZOLOFT) 50 mg tablet Take 50 mg by mouth daily. Indications: Bipolar Depression  Oxygen-Air Delivery Systems by Nasal route continuous. 2 LPM via NC  Indications: Hypoxemia requiring supplementary oxygen Past History Past Medical History: 
Past Medical History:  
Diagnosis Date  Abnormally low high density lipoprotein (HDL) cholesterol with hypertriglyceridemia Lipid profile (11/6/2016) showed , , HDL 38, LDL 37  Acute blood loss as cause of postoperative anemia 12/12/2019  Anxiety  Bipolar affective disorder ([address] 12/5/2012  Cellulitis of right forearm 05/04/2017  Chronic back pain  Chronic obstructive pulmonary disease (COPD) ([address] SOB, on [place] O2 Recent admission with psychosis  Chronic respiratory failure with hypoxia ([address] On home oxygen inhalation at 3 LPM via NC  
 Contusion of left elbow 5/4/2017  Depression  Diabetic neuropathy associated with type 2 diabetes mellitus ([address]  Diastolic dysfunction without heart failure Stable on diuretics  Falls frequently  Gastroesophageal reflux disease with hiatal hernia  Generalized osteoarthritis of multiple sites  Gout On Allopurinol  History of acute renal failure 2013  History of back injury   
 jumped out of second story window  History of fracture due to fall 2019  
 History of hepatitis C   
 treated  History of penicillin allergy  History of vitamin D deficiency 5/10/2017  Hypertensive heart disease without heart failure Better controlled  Hyperuricemia 2017  Hypothyroidism  Hypoxemia requiring supplemental oxygen 2014  Intravenous drug user 2017  Long term current use of aspirin  Memory difficulty  Menopause  Mixed connective tissue disease ([address] 5/10/2017  Obesity, Class I, BMI 30-34.9  Olecranon bursitis of right elbow 2017  Opioid dependence ([address] On Methadone  Recurrent genital herpes 2013  Right Achilles tendinitis 2017  Sarcoidosis  Sickle cell trait ([address]  Tobacco use disorder  Type 2 diabetes mellitus with diabetic neuropathy, with long-term current use of insulin ([address] HbA1c (2019) = 7.1  Urge urinary incontinence 5/10/2017  Venous insufficiency  Wears glasses Past Surgical History: 
Past Surgical History:  
Procedure Laterality Date [facility]  HX  SECTION    
 HX CYST REMOVAL Left  Left foot  HX OTHER SURGICAL Left 2017 S/P incision and drainage of the abscess of the left hand and the left foot (2017 - Dr. Rendall Curling. Amanda Barnett)  HX THORACIC LAMINECTOMY  2019 S/P T10, T9, T8 laminectomy; T7, T8, T9, T10, T11, T12 posterior thoracic fusion; segmental spinal instrumentation; K2M [place] type, T7-T8, T11-T12 (2019 - Dr. Jamel Yi)  HX TUBAL LIGATION Family History: 
Family History Problem Relation Age of Onset  Seizures Other  Diabetes Mother  Hypertension Mother  Heart Disease Mother  Cancer Mother  Diabetes Father  Stroke Father  Heart Disease Father  Headache Sister  Depression Neg Hx  Suicide Neg Hx  Psychotic Disorder Neg Hx  Substance Abuse Neg Hx  Dementia Neg Hx Social History: 
Social History Tobacco Use  Smoking status: Current Every Day Smoker Packs/day: 1.00 Years: 30.00 Pack years: 30.00 Types: Cigarettes Start date: 12/2/1969  Smokeless tobacco: Never Used Substance Use Topics  Alcohol use: No  
 Drug use: No  
  Types: Cocaine, Heroin Comment: +Cocaine Screen 2013 Allergies: Allergies Allergen Reactions  Moxifloxacin Rash  Pcn [Penicillins] Swelling  Sulfa (Sulfonamide Antibiotics) Swelling Review of Systems Review of Systems Constitutional: Positive for activity change and chills. HENT: Negative for congestion. Respiratory: Positive for cough. Negative for shortness of breath. Cardiovascular: Negative for chest pain. Gastrointestinal: Negative for abdominal pain, diarrhea, nausea and vomiting. Genitourinary: Negative for difficulty urinating. Musculoskeletal: Positive for back pain. Skin: Negative for rash. Neurological: Negative for numbness and headaches. Psychiatric/Behavioral: Positive for confusion. Physical Exam  
 
Visit Vitals /69 (BP 1 Location: Left arm) Pulse 92 Temp 99.7 °F (37.6 °C) Resp 23 Wt 76.5 kg (168 lb 11.2 oz) LMP 11/25/2012 (Exact Date) SpO2 100% BMI 28.07 kg/m² Physical Exam 
Constitutional:   
   General: She is not in acute distress. HENT:  
   Head: Normocephalic and atraumatic. Mouth/Throat:  
   Mouth: Mucous membranes are moist.  
   Pharynx: Oropharynx is clear. Eyes:  
   Extraocular Movements: Extraocular movements intact. Pupils: Pupils are equal, round, and reactive to light. Neck: Musculoskeletal: Neck supple. No neck rigidity. Comments: No meningeal signs Cardiovascular: Rate and Rhythm: Regular rhythm. Tachycardia present. Pulmonary:  
   Effort: Pulmonary effort is normal. No respiratory distress. Breath sounds: Normal breath sounds. Abdominal:  
   General: Bowel sounds are normal. There is no distension. Palpations: Abdomen is soft. Tenderness: There is no tenderness. Musculoskeletal:     
   General: No swelling or tenderness. Skin: 
   General: Skin is warm and dry. Comments: Surgical incisions on back well-healed without evidence of infection Neurological:  
   Mental Status: She is alert. GCS: GCS eye subscore is 4. GCS verbal subscore is 5. GCS motor subscore is 6. Cranial Nerves: No cranial nerve deficit, dysarthria or facial asymmetry. Sensory: No sensory deficit. Motor: No weakness. Comments: Oriented x2. Answers questions appropriately but occasionally is confused during interview. Psychiatric:     
   Mood and Affect: Mood normal.     
   Behavior: Behavior normal.  
 
 
 
 
Diagnostic Study Results Labs - Recent Results (from the past 12 hour(s)) EKG, 12 LEAD, INITIAL Collection Time: 01/17/20  7:09 PM  
Result Value Ref Range Ventricular Rate 114 BPM  
 Atrial Rate 114 BPM  
 P-R Interval 114 ms QRS Duration 70 ms Q-T Interval 348 ms QTC Calculation (Bezet) 479 ms Calculated P Axis 60 degrees Calculated R Axis 48 degrees Calculated T Axis 63 degrees Diagnosis Sinus tachycardia Possible Left atrial enlargement Nonspecific ST abnormality Abnormal ECG When compared with ECG of 10-DEC-2019 16:34, 
Vent. rate has increased BY  43 BPM 
Criteria for Septal infarct are no longer present ST no longer elevated in Anterior leads METABOLIC PANEL, COMPREHENSIVE Collection Time: 01/17/20  7:35 PM  
Result Value Ref Range Sodium 134 (L) 136 - 145 mmol/L Potassium 3.6 3.5 - 5.5 mmol/L  Chloride 96 (L) 100 - 111 mmol/L  
 CO2 32 21 - 32 mmol/L  
 Anion gap 6 3.0 - 18 mmol/L Glucose 118 (H) 74 - 99 mg/dL BUN 15 7.0 - 18 MG/DL Creatinine 0.96 0.6 - 1.3 MG/DL  
 BUN/Creatinine ratio 16 12 - 20 GFR est AA >60 >60 ml/min/1.73m2 GFR est non-AA 59 (L) >60 ml/min/1.73m2 Calcium 9.8 8.5 - 10.1 MG/DL Bilirubin, total 1.0 0.2 - 1.0 MG/DL  
 ALT (SGPT) 26 13 - 56 U/L  
 AST (SGOT) 15 10 - 38 U/L Alk. phosphatase 114 45 - 117 U/L Protein, total 8.2 6.4 - 8.2 g/dL Albumin 3.6 3.4 - 5.0 g/dL Globulin 4.6 (H) 2.0 - 4.0 g/dL A-G Ratio 0.8 0.8 - 1.7    
CBC WITH AUTOMATED DIFF Collection Time: 01/17/20  7:35 PM  
Result Value Ref Range WBC 16.1 (H) 4.6 - 13.2 K/uL  
 RBC 4.97 4.20 - 5.30 M/uL  
 HGB 14.8 12.0 - 16.0 g/dL HCT 43.8 35.0 - 45.0 % MCV 88.1 74.0 - 97.0 FL  
 MCH 29.8 24.0 - 34.0 PG  
 MCHC 33.8 31.0 - 37.0 g/dL  
 RDW 17.7 (H) 11.6 - 14.5 % PLATELET 99 (L) 580 - 420 K/uL MPV 10.6 9.2 - 11.8 FL  
 NEUTROPHILS 76 (H) 40 - 73 % LYMPHOCYTES 17 (L) 21 - 52 % MONOCYTES 7 3 - 10 % EOSINOPHILS 0 0 - 5 % BASOPHILS 0 0 - 2 %  
 ABS. NEUTROPHILS 12.2 (H) 1.8 - 8.0 K/UL  
 ABS. LYMPHOCYTES 2.8 0.9 - 3.6 K/UL  
 ABS. MONOCYTES 1.1 0.05 - 1.2 K/UL  
 ABS. EOSINOPHILS 0.1 0.0 - 0.4 K/UL  
 ABS. BASOPHILS 0.0 0.0 - 0.1 K/UL  
 DF AUTOMATED    
SED RATE (ESR) Collection Time: 01/17/20  7:35 PM  
Result Value Ref Range Sed rate, automated 8 0 - 30 mm/hr C REACTIVE PROTEIN, QT Collection Time: 01/17/20  7:35 PM  
Result Value Ref Range C-Reactive protein 6.1 (H) 0 - 0.3 mg/dL POC LACTIC ACID Collection Time: 01/17/20  7:38 PM  
Result Value Ref Range Lactic Acid (POC) 1.29 0.40 - 2.00 mmol/L  
URINALYSIS W/ RFLX MICROSCOPIC Collection Time: 01/17/20  8:49 PM  
Result Value Ref Range Color YELLOW Appearance CLEAR Specific gravity 1.005 1.005 - 1.030    
 pH (UA) 6.5 5.0 - 8.0 Protein NEGATIVE  NEG mg/dL Glucose NEGATIVE  NEG mg/dL Ketone NEGATIVE  NEG mg/dL Bilirubin NEGATIVE  NEG Blood NEGATIVE  NEG Urobilinogen 0.2 0.2 - 1.0 EU/dL Nitrites NEGATIVE  NEG Leukocyte Esterase NEGATIVE  NEG    
INFLUENZA A & B AG (RAPID TEST) Collection Time: 01/17/20  8:49 PM  
Result Value Ref Range Influenza A Antigen NEGATIVE  NEG Influenza B Antigen NEGATIVE  NEG Radiologic Studies -  
XR CHEST PORT Final Result IMPRESSION:  
  
Emphysema with scarring. Probable pulmonary vascular congestion. Report provided to the emergency department at 2241 hrs. Medical Decision Making I am the first provider for this patient. I reviewed the vital signs, available nursing notes, past medical history, past surgical history, family history and social history. Vital Signs-Reviewed the patient's vital signs. EKG: sinus tachycardia without signs of ischemia Records Reviewed: Nursing Notes and Old Medical Records (Time of Review: 7:02 PM) 
 
ED Course: Progress Notes, Reevaluation, and Consults: 
 
Patient initially tachycardic and febrile, improved with tylenol and IV fluids. Abx started empirically for sepsis, concern was for respiratory source of infection but cxr largely unimpressive. I spoke with Dr Jessy Jackson who performed patient's laminectomy on 11Dec. He requested to be on treatment team and will see patient in AM. He provided that spinal epidural abscess is unlikely given the surgery type and length since operation and did not rec'd MRI at this time, but did state that MRI should be considered at discretion of primary team if no other source of infection apparent. I also spoke with hospitalist Dr Trudy Barajas who will admit for sepsis. She states that she will wait until AM when Dr Jessy Jackson sees patient before ordering MRI T&L-spine. Provider Notes (Medical Decision Making): MDM 
63 y/o F with hx COPD, DM2 and laminectomy on 11Dec presenting with fever and tachycardia, likely sepsis but source unclear at this time.  CXR unimpressive, UA without infection, influenza neg, bcx pending, wbc 16 but has been on steroids recently, no murmur appreciated, skin over surgery site intact and no focal neuro deficit, not obtunded to suggest encephalopathy and no significant metabolic derangements. Will admit for sepsis, broad spectrum IV abx and fluids. CRP elevated but ESR normal, this in addition to hx, exam and discussed with Dr Tyrone Rojo as noted above this makes spinal epidural abscess less likely but this diagnosis remains on ddx at this time. Patient is stable and appropriate for admission to telemetry. Patient allergic to penicillins (rash) and fluoroquinolones so vanc and cefepime used without zosyn or levaquin. Procedures Critical Care Time: 36 mins Diagnosis Clinical Impression: 1. Sepsis, due to unspecified organism, unspecified whether acute organ dysfunction present ([facility] [address] Disposition: admitted Follow-up Information None Patient's Medications Start Taking No medications on file Continue Taking ACETAMINOPHEN (TYLENOL) 325 MG TABLET    Take 2 Tabs by mouth every four (4) hours as needed for Pain. Indications: pain ALBUTEROL (PROAIR HFA) 90 MCG/ACTUATION INHALER    INHALE 2 PUFFS EVERY 4 HOURS AS NEEDED FOR WHEEZING  
 ALLOPURINOL (ZYLOPRIM) 100 MG TABLET    Take 100 mg by mouth daily. ALLOPURINOL (ZYLOPRIM) 100 MG TABLET    Take 100 mg by mouth daily. ASCORBIC ACID, VITAMIN C, (VITAMIN C) 250 MG TABLET    Take 1 Tab by mouth daily (with breakfast). ASPIRIN 81 MG CHEWABLE TABLET    Take 1 Tab by mouth daily (with breakfast). Indications: stroke prevention BD INSULIN SYRINGE ULTRA-FINE 1 ML 31 GAUGE X 5/16 SYRG      
 BRIEF DISPOSABLE (ADULT) MISC    by Does Not Apply route. CALCIUM CITRATE 200 MG (950 MG) TABLET    Take 1 Tab by mouth two (2) times a day. Indications: post-menopausal osteoporosis prevention CHOLECALCIFEROL (VITAMIN D3) (1000 UNITS /25 MCG) TABLET    Take 1,000 Units by mouth two (2) times a day. DIVALPROEX DR (DEPAKOTE) 250 MG TABLET    Take 250 mg by mouth nightly. Indications: BIPOLAR DISORDER IN REMISSION  
 DOCUSATE SODIUM (COLACE) 100 MG CAPSULE    Take 1 Cap by mouth daily (after breakfast). Indications: constipation FAMOTIDINE (PEPCID) 20 MG TABLET    Take 20 mg by mouth nightly. FERROUS SULFATE 325 MG (65 MG IRON) TABLET    Take 1 Tab by mouth daily (with breakfast). GABAPENTIN (NEURONTIN) 300 MG CAPSULE    Take 300 mg by mouth daily. GUAIFENESIN ER (MUCINEX) 600 MG ER TABLET    Take 1 Tab by mouth every twelve (12) hours for 7 days. Indications: cough INSULIN GLARGINE (LANTUS U-100 INSULIN) 100 UNIT/ML INJECTION    40 Units by SubCUTAneous route Daily (before breakfast). INSULIN LISPRO (HUMALOG) 100 UNIT/ML INJECTION    To be given 15 min before meals: < 150 - None, 151-200 - 2 u, 201-250 - 4 u, 251-300 - 6 u, 301-350 - 8 u, 351-400 - 10 u, >400 - 12 u INSULIN SYRINGE,SAFETYNEEDLE 1 ML 30 GAUGE X 5/16\" SYRG    As directed LEVOTHYROXINE (SYNTHROID) 100 MCG TABLET    Take 100 mcg by mouth Daily (before breakfast). METHADONE (DOLOPHINE) 5 MG TABLET    Take 4 Tabs by mouth daily. Max Daily Amount: 20 mg. METHOCARBAMOL (ROBAXIN) 500 MG TABLET    Take 1 Tab by mouth every eight (8) hours for 7 days. Indications: muscle spasm NEBULIZER ACCESSORIES KIT    Use with nebulizer machine OXYBUTYNIN (DITROPAN) 5 MG TABLET    Take 5 mg by mouth two (2) times a day. OXYCODONE-ACETAMINOPHEN (PERCOCET) 5-325 MG PER TABLET    Take 1 Tab by mouth every six (6) hours as needed (for pain levle greater than 4/10) for up to 5 days. Max Daily Amount: 4 Tabs. Indications: pain OXYGEN-AIR DELIVERY SYSTEMS    by Nasal route continuous. 2 LPM via NC  Indications: Hypoxemia requiring supplementary oxygen  POLYETHYLENE GLYCOL (MIRALAX) 17 GRAM/DOSE POWDER    Take 17 g by mouth daily. 1 tablespoon with 8 oz of water daily PREDNISONE (DELTASONE) 20 MG TABLET    Take 20 mg by mouth daily (with breakfast). Indications: sarcoidosis ROSUVASTATIN (CRESTOR) 5 MG TABLET    TAKE ONE TABLET NIGHTLY SERTRALINE (ZOLOFT) 50 MG TABLET    Take 50 mg by mouth daily. Indications: Bipolar Depression TRAZODONE (DESYREL) 100 MG TABLET    Take 100 mg by mouth nightly. Indications: major depressive disorder UMECLIDINIUM-VILANTEROL (ANORO ELLIPTA) 62.5-25 MCG/ACTUATION INHALER    Take 1 Puff by inhalation daily. These Medications have changed No medications on file Stop Taking No medications on file Disclaimer: Sections of this note are dictated using utilizing voice recognition software. Minor typographical errors may be present. If questions arise, please do not hesitate to contact me or call our department.

## 2020-01-18 NOTE — ED NOTES
Pt awake and alert. Pt states she needs changed. Changed pt's diaper. Adjusted [place]. Will continue to monitor.

## 2020-01-18 NOTE — ED NOTES
Pt A&O2. Pt non ambulatory. [place] catheter placed. Pt incontinent. Pt changed and cleaned. Urinalysis via straight catheter obtained. Pt placed on O2 via nasal cannula at 3 L. Will continue to monitor.

## 2020-01-18 NOTE — ED NOTES
I evaluated patient along with resident. Patient with fever, she is tachycardic, tachypneic with cough and back pain. Status post laminectomy with Dr. Ana M Torres. Lungs are equal lung sounds, with some scattered rhonchi Concern for infectious process Possible pneumonia WBC 16 Lactic acid normal 
CRP high at 6.1, sed rate however negative Patient getting IV fluids and antibiotics ? MRI lumbar spine. We will consult Dr. Ana M Torres. patient will need admission. Sepsis reevaluation and exam:   
  
Reevaluation: 
 
Vital Signs:  
Patient Vitals for the past 12 hrs: 
 Temp Pulse Resp BP SpO2  
01/17/20 2147 99.7 °F (37.6 °C) 92 23 102/69 100 % 01/17/20 1854 (!) 101.2 °F (38.4 °C) (!) 107 27 112/65 97 % Cardiopulmonary assessment: 
RESP: Chest with few scattered rhonchi, equal breath sounds. CV: S1 and S2 WNL; No murmurs, gallops or rubs. Capillary refill: Normal 
Peripheral pulse:   Normal 
 
Skin exam: 
Skin color: Oval Bach Skin Turgor: Good Sepsis reevaluation and exam performed at 9:43 PM. Critical care time:  
I have spent 53 minutes of critical care time involved in lab review, consultations with specialist, family decision making, and documentation. During this entire length of time I was immediately available to the patient. Critical care: The reason for providing this level of medical care for this critically ill patient was due to a critical illness that impaired one or more vital organ systems such that there was a high probability of imminent or life threatening deterioration in the patients condition. This care involved high complexity decision making to assess, manipulate and support vital system functions, to treat this degree vital organ system failure and to prevent further life threatening deterioration of the patient's condition.

## 2020-01-18 NOTE — ED NOTES
Assumed care of this pt @ 2985. Pt alert/oriented x 2 at this time. Will continue to monitor until bed assignment received.

## 2020-01-18 NOTE — PROGRESS NOTES
Patient has been seen and examined in the emergency room and preliminary orders have been entered full H&P to follow

## 2020-01-18 NOTE — H&P
History & Physical 
 
Patient: Airam Valadez MRN: [id number]  CSN: [id number] YOB: 1955  Age: 62 y.o. Sex: female DOA: 1/17/2020 HPI:  
 
Airam Valadez is a 62 y.o. female with a history of sarcoidosis on chronic prednisone, COPD, hypertension, chronic diastolic CHF, hypothyroid, diabetes mellitus, GERD, hypothyroidism, opioid dependence on methadone daily and sickle cell trait. She was recently hospitalized for T8-T10 laminectomy, T7-12 thoracic fusion by Dr. Anmol Todd and discharged to rehab. She was discharged from rehab  
3 days ago and has developed a cough with increased shortness of breath and states her last episode was 1/16/2020. She had a change in mental status on 1/17/2020 and arrived to the ED at [facility] by EMS. In the ER she was found to be febrile 101.2, tachycardic 107, /65 and tachypneic with a cough, generalized weakness and back pain. WBC 16, CRP elevated at 6.1. Influenza was negative. Chest x-ray with mild vascular congestion. Sepsis bundle was initiated in the ED-source is unclear-recent surgery, recently discharged from inpatient rehab 3 days ago (HAP?)  UA is unremarkable. Blood and respiratory cultures were sent. Patient is admitted to telemetry Past Medical History:  
Diagnosis Date  Abnormally low high density lipoprotein (HDL) cholesterol with hypertriglyceridemia Lipid profile (11/6/2016) showed , , HDL 38, LDL 37  Acute blood loss as cause of postoperative anemia 12/12/2019  Anxiety  Bipolar affective disorder ([address] 12/5/2012  Cellulitis of right forearm 05/04/2017  Chronic back pain  Chronic obstructive pulmonary disease (COPD) ([address] SOB, on [place] O2 Recent admission with psychosis  Chronic respiratory failure with hypoxia ([address] On home oxygen inhalation at 3 LPM via NC  
 Contusion of left elbow 5/4/2017  Depression  Diabetic neuropathy associated with type 2 diabetes mellitus ([address]  Diastolic dysfunction without heart failure Stable on diuretics  Falls frequently  Gastroesophageal reflux disease with hiatal hernia  Generalized osteoarthritis of multiple sites  Gout On Allopurinol  History of acute renal failure 2013  History of back injury   
 jumped out of second story window  History of fracture due to fall 2019  
 History of hepatitis C   
 treated  History of penicillin allergy  History of vitamin D deficiency 5/10/2017  Hypertensive heart disease without heart failure Better controlled  Hyperuricemia 2017  Hypothyroidism  Hypoxemia requiring supplemental oxygen 2014  Intravenous drug user 2017  Long term current use of aspirin  Memory difficulty  Menopause  Mixed connective tissue disease ([address] 5/10/2017  Obesity, Class I, BMI 30-34.9  Olecranon bursitis of right elbow 2017  Opioid dependence ([address] On Methadone  Recurrent genital herpes 2013  Right Achilles tendinitis 2017  Sarcoidosis  Sickle cell trait ([address]  Tobacco use disorder  Type 2 diabetes mellitus with diabetic neuropathy, with long-term current use of insulin ([address] HbA1c (2019) = 7.1  Urge urinary incontinence 5/10/2017  Venous insufficiency  Wears glasses Past Surgical History:  
Procedure Laterality Date [facility]  HX  SECTION    
 HX CYST REMOVAL Left  Left foot  HX OTHER SURGICAL Left 2017 S/P incision and drainage of the abscess of the left hand and the left foot (2017 - Dr. Jarred Rosas. Cristine Gloria)  HX THORACIC LAMINECTOMY  2019 S/P T10, T9, T8 laminectomy; T7, T8, T9, T10, T11, T12 posterior thoracic fusion; segmental spinal instrumentation; K2M Davis type, T7-T8, T11-T12 (2019 - Dr. Shruti Blue)  HX TUBAL LIGATION Family History Problem Relation Age of Onset  Seizures Other  Diabetes Mother  Hypertension Mother  Heart Disease Mother  Cancer Mother  Diabetes Father  Stroke Father  Heart Disease Father  Headache Sister  Depression Neg Hx  Suicide Neg Hx  Psychotic Disorder Neg Hx  Substance Abuse Neg Hx  Dementia Neg Hx Social History Socioeconomic History  Marital status: SINGLE Spouse name: Not on file  Number of children: Not on file  Years of education: Not on file  Highest education level: Not on file Occupational History  Occupation: Not employed Employer: NOT EMPLOYED Tobacco Use  Smoking status: Current Every Day Smoker Packs/day: 1.00 Years: 30.00 Pack years: 30.00 Types: Cigarettes Start date: 12/2/1969  Smokeless tobacco: Never Used Substance and Sexual Activity  Alcohol use: No  
 Drug use: No  
  Types: Cocaine, Heroin Comment: +Cocaine Screen 2013 Social History Narrative Lives with 15year old son. Prior to Admission medications Medication Sig Start Date End Date Taking? Authorizing Provider  
oxyCODONE-acetaminophen (PERCOCET) 5-325 mg per tablet Take 1 Tab by mouth every six (6) hours as needed (for pain levle greater than 4/10) for up to 5 days. Max Daily Amount: 4 Tabs. Indications: pain 1/14/20 1/19/20 Yes Conception Bloch, MD  
aspirin 81 mg chewable tablet Take 1 Tab by mouth daily (with breakfast). Indications: stroke prevention 1/13/20  Yes Conception Bloch, MD  
docusate sodium (COLACE) 100 mg capsule Take 1 Cap by mouth daily (after breakfast). Indications: constipation 1/13/20  Yes Conception Bloch, MD  
predniSONE (DELTASONE) 20 mg tablet Take 20 mg by mouth daily (with breakfast).  Indications: sarcoidosis 1/14/20  Yes Conception Bloch, MD  
ascorbic acid, vitamin C, (VITAMIN C) 250 mg tablet Take 1 Tab by mouth daily (with breakfast). 1/14/20  Yes Milo Khan MD  
calcium citrate 200 mg (950 mg) tablet Take 1 Tab by mouth two (2) times a day. Indications: post-menopausal osteoporosis prevention 1/14/20  Yes Milo Khan MD  
ferrous sulfate 325 mg (65 mg iron) tablet Take 1 Tab by mouth daily (with breakfast). 1/14/20  Yes Milo Khan MD  
guaiFENesin ER (MUCINEX) 600 mg ER tablet Take 1 Tab by mouth every twelve (12) hours for 7 days. Indications: cough 1/14/20 1/21/20 Yes Milo Khan MD  
methocarbamol (ROBAXIN) 500 mg tablet Take 1 Tab by mouth every eight (8) hours for 7 days. Indications: muscle spasm 1/14/20 1/21/20 Yes Milo Khan MD  
acetaminophen (TYLENOL) 325 mg tablet Take 2 Tabs by mouth every four (4) hours as needed for Pain. Indications: pain 1/13/20  Yes Milo Khan MD  
brief disposable (ADULT) misc by Does Not Apply route. 1/9/20  Yes Milo Khan MD  
methadone (DOLOPHINE) 5 mg tablet Take 4 Tabs by mouth daily. Max Daily Amount: 20 mg. 12/17/19  Yes Rodolfo Sanchez MD  
polyethylene glycol (MIRALAX) 17 gram/dose powder Take 17 g by mouth daily. 1 tablespoon with 8 oz of water daily 11/29/19  Yes Radha Theodore PA-C  
rosuvastatin (CRESTOR) 5 mg tablet TAKE ONE TABLET NIGHTLY 2/21/19  Yes Fredy Gomez MD  
albuterol (PROAIR HFA) 90 mcg/actuation inhaler INHALE 2 PUFFS EVERY 4 HOURS AS NEEDED FOR WHEEZING 1/31/19  Yes Venkat Schmitt MD  
insulin lispro (HUMALOG) 100 unit/mL injection To be given 15 min before meals: < 150 - None, 151-200 - 2 u, 201-250 - 4 u, 251-300 - 6 u, 301-350 - 8 u, 351-400 - 10 u, >400 - 12 u 5/31/17  Yes Milo Khan MD  
insulin syringe,safetyneedle 1 mL 30 gauge x 5/16\" syrg As directed 11/8/16  Yes Rodolfo Sanchez MD  
Nebulizer Accessories Kit Use with nebulizer machine 10/31/12  Yes Venkat Schmitt MD  
allopurinoL (ZYLOPRIM) 100 mg tablet Take 100 mg by mouth daily.     Provider, Historical  
 gabapentin (NEURONTIN) 300 mg capsule Take 300 mg by mouth daily. Provider, Historical  
cholecalciferol (VITAMIN D3) (1000 Units /25 mcg) tablet Take 1,000 Units by mouth two (2) times a day. Provider, Historical  
levothyroxine (SYNTHROID) 100 mcg tablet Take 100 mcg by mouth Daily (before breakfast). Provider, Historical  
insulin glargine (LANTUS U-100 INSULIN) 100 unit/mL injection 40 Units by SubCUTAneous route Daily (before breakfast). Provider, Historical  
famotidine (PEPCID) 20 mg tablet Take 20 mg by mouth nightly. Provider, Historical  
umeclidinium-vilanterol (ANORO ELLIPTA) 62.5-25 mcg/actuation inhaler Take 1 Puff by inhalation daily. 11/20/19   Ovidio Crnae MD  
BD INSULIN SYRINGE ULTRA-FINE 1 mL 31 gauge x 5/16 syrg  11/5/19   Provider, Historical  
oxybutynin (DITROPAN) 5 mg tablet Take 5 mg by mouth two (2) times a day. Provider, Historical  
allopurinol (ZYLOPRIM) 100 mg tablet Take 100 mg by mouth daily. Provider, Historical  
divalproex DR (DEPAKOTE) 250 mg tablet Take 250 mg by mouth nightly. Indications: BIPOLAR DISORDER IN REMISSION    Provider, Historical  
traZODone (DESYREL) 100 mg tablet Take 100 mg by mouth nightly. Indications: major depressive disorder    Other, MD Karsten  
sertraline (ZOLOFT) 50 mg tablet Take 50 mg by mouth daily. Indications: Bipolar Depression    Provider, Historical  
Oxygen-Air Delivery Systems by Nasal route continuous. 2 LPM via NC  Indications: Hypoxemia requiring supplementary oxygen    Provider, Historical  
 
 
Allergies Allergen Reactions  Moxifloxacin Rash  Pcn [Penicillins] Swelling  Sulfa (Sulfonamide Antibiotics) Swelling Review of systems 12 point review of systems was performed with pertinent positives as stated in HPI Physical Exam:  
  
Visit Vitals /71 Pulse 80 Temp 99.7 °F (37.6 °C) Resp 20 Wt 76.5 kg (168 lb 11.2 oz) SpO2 100% BMI 28.07 kg/m² Physical Exam: GENERAL: fatigued, cooperative HEENT atraumatic, conjunctiva clear, anicteric, mucous membranes moist, pharynx clear, neck is supple Chest heart rate 80 regular, scattered rhonchi clear with cough Abdmen soft, bowel sounds positive, nontender Surgical wounds over back are well-healed without erythema or swelling Extremities generalized weakness, moving all 4 extremities, calves are soft, positive pulses Lab/Data Review: 
Labs: Results:  
   
Chemistry Recent Labs  
  01/18/20 
0801 01/18/20 
0556 01/17/20 1935 GLU  --  91 118* NA  --  140 134* K  --  3.9 3.6 CL  --  105 96* CO2  --  30 32 BUN  --  13 15 CREA  --  0.83 0.96  
CA  --  8.8 9.8 AGAP  --  5 6 BUCR  --  16 16 AP 82  --  114  
TP 6.6  --  8.2 ALB 2.6*  --  3.6 GLOB 4.0  --  4.6* AGRAT 0.7*  --  0.8 CBC w/Diff Recent Labs  
  01/18/20 
0801 01/17/20 1935 WBC 12.1 16.1*  
RBC 4.38 4.97  
HGB 12.7 14.8 HCT 38.8 43.8 PLT 77* 99* GRANS 74* 76* LYMPH 17* 17* EOS 0 0 Coagulation No results for input(s): PTP, INR, APTT, INREXT in the last 72 hours. Iron/Ferritin No results for input(s): IRON in the last 72 hours. No lab exists for component: TIBCCALC BNP No results for input(s): BNPP in the last 72 hours. Cardiac Enzymes No results for input(s): CPK, CKND1, SANTOS in the last 72 hours. No lab exists for component: Marlyse Maker Liver Enzymes Recent Labs  
  01/18/20 
0801 TP 6.6 ALB 2.6* AP 82 SGOT 16 Thyroid Studies Lab Results Component Value Date/Time TSH 0.74 12/11/2019 05:37 PM  
    
 
All Micro Results Procedure Component Value Units Date/Time CULTURE, BLOOD [[id number]] Collected:  01/17/20 2102 Order Status:  Completed Specimen:  Blood Updated:  01/18/20 6859 Special Requests: --     
  NO SPECIAL REQUESTS 
LEFT 
FOREARM Culture result: NO GROWTH AFTER 10 HOURS     
 CULTURE, BLOOD [[id number]] Collected:  01/17/20 1935 Order Status:  Completed Specimen:  Blood Updated:  01/18/20 4255 Special Requests: NO SPECIAL REQUESTS Culture result: NO GROWTH AFTER 11 HOURS INFLUENZA A & B AG (RAPID TEST) [[id number]] Collected:  01/17/20 2049 Order Status:  Completed Specimen:  Nasopharyngeal from Nasal washing Updated:  01/17/20 2130 Influenza A Antigen NEGATIVE Comment: A negative result does not exclude influenza virus infection, seasonal or H1N1 due to suboptimal sensitivity. If influenza is circulating in your community, a diagnosis of influenza should be considered based on a patients clinical presentation and empiric antiviral treatment should be considered, if indicated. Influenza B Antigen NEGATIVE Imaging Reviewed: 
1/17/2020 Chest Xray EXAM: PORTABLE CHEST 1933 hours 
  
CLINICAL HISTORY/INDICATION: Sepsis , altered mental status, lethargic, one 
month status post laminectomy and spinal fusion COMPARISON: Chest x-ray December 11, 2019, December 10, 2019. 
  
TECHNIQUE: Single AP view 
  
FINDINGS:  
  
Bilateral emphysema. Mild hypoinflation. Prominent interstitial markings. Costophrenic angles are sharp. Linear scarring in the right upper lung. Thoracic 
spine fusion hardware. . 
  
IMPRESSION IMPRESSION: 
  
Emphysema with scarring. Probable pulmonary vascular congestion. 
  
 
Assessment:  
Principal Problem: 
  Sepsis ([address] (1/17/2020) Active Problems: 
  Diabetic neuropathy associated with type 2 diabetes mellitus ([address] () Type 2 diabetes mellitus with diabetic neuropathy, with long-term current use of insulin ([facility]) () Overview: HbA1c (12/11/2019) = 7.1 Sarcoidosis () Impaired mobility and ADLs (12/11/2019) Plan:  
Sepsis bundle was initiated in the ED Orthopedics/Spine was consulted from the ED Continue empiric antibiotics Continue home meds POC glucose with coverage Full code Vlad Edward DO 
1/18/2020, 12:06 PM

## 2020-01-18 NOTE — ED NOTES
Pt A&Ox2. Pt still has altered mental status. Dr. Ritu Erickson called about pt status. Will continue to monitor.

## 2020-01-19 NOTE — ROUTINE PROCESS
Verbal bedside shift report given to Jesica Darden RN on coming nurse by Wong Berg RN off going nurse including SBAR, KARDEX and [facility]

## 2020-01-19 NOTE — ROUTINE PROCESS
Report received from [facility]. Patient arrived from the ED. Assisted patient to bed, O2 in place via NC. No distress noted. Call bell in reach. 0015  Nebulizer treatment given for wheezing, no distress noted. 0800  Bedside, Verbal and Written shift change report given to [id number] Germain Echevarria (oncoming nurse) by Irina Mora (offgoing nurse). Report included the following information SBAR, Kardex, MAR and [employer].

## 2020-01-19 NOTE — PROGRESS NOTES
Problem: Self Care Deficits Care Plan (Adult) Goal: *Acute Goals and Plan of Care (Insert Text) Outcome: Resolved/Not Met OCCUPATIONAL THERAPY EVALUATION/DISCHARGE Patient: Fatuma Patel (65 y.o. female) Date: 1/19/2020 Primary Diagnosis: Sepsis ([facility] [A41.9] Precautions: fall PLOF: she was in rehab and was home with home health for 2 to 3 days prior to her readmission. During that time, she had a home health aid who assisted her with her bathing and dressing and PT and OT completed their evaluations. She was able to complete her self care routine with minimal assistance prior to her returning home. ASSESSMENT AND RECOMMENDATIONS: 
Based on the objective data described below, the patient presents at her baseline functional level she was at when she left the acute rehab. Feel she has further skilled OT needs at her home situation (in order to apply her learned techniques to her home environment) Skilled acute care occupational therapy is not indicated at this time. Discharge Recommendations: Home Health Further Equipment Recommendations for Discharge: N/A   
 
SUBJECTIVE:  
Patient stated I wasn't home long enough to know how independent is was able to manage while at home.  OBJECTIVE DATA SUMMARY:  
 
Past Medical History:  
Diagnosis Date Abnormally low high density lipoprotein (HDL) cholesterol with hypertriglyceridemia Lipid profile (11/6/2016) showed , , HDL 38, LDL 37 Acute blood loss as cause of postoperative anemia 12/12/2019 Anxiety Bipolar affective disorder ([facility] 12/5/2012 Cellulitis of right forearm 05/04/2017 Chronic back pain Chronic obstructive pulmonary disease (COPD) ([facility] SOB, on [place] O2 Recent admission with psychosis Chronic respiratory failure with hypoxia (HCC) On home oxygen inhalation at 3 LPM via NC Contusion of left elbow 5/4/2017 Depression Diabetic neuropathy associated with type 2 diabetes mellitus ([address] Diastolic dysfunction without heart failure Stable on diuretics Falls frequently Gastroesophageal reflux disease with hiatal hernia Generalized osteoarthritis of multiple sites Gout On Allopurinol History of acute renal failure 2013 History of back injury   
 jumped out of second story window History of fracture due to fall 2019 History of hepatitis C   
 treated History of penicillin allergy History of vitamin D deficiency 5/10/2017 Hypertensive heart disease without heart failure Better controlled Hyperuricemia 2017 Hypothyroidism Hypoxemia requiring supplemental oxygen 2014 Intravenous drug user 2017 Long term current use of aspirin Memory difficulty Menopause Mixed connective tissue disease ([address] 5/10/2017 Obesity, Class I, BMI 30-34.9 Olecranon bursitis of right elbow 2017 Opioid dependence ([address] On Methadone Recurrent genital herpes 2013 Right Achilles tendinitis 2017 Sarcoidosis Sickle cell trait ([address] Tobacco use disorder Type 2 diabetes mellitus with diabetic neuropathy, with long-term current use of insulin ([address] HbA1c (2019) = 7.1 Urge urinary incontinence 5/10/2017 Venous insufficiency Wears glasses Past Surgical History:  
Procedure Laterality Date [address] HX  SECTION    
 HX CYST REMOVAL Left  Left foot HX OTHER SURGICAL Left 2017 S/P incision and drainage of the abscess of the left hand and the left foot (2017 - Dr. Josh Christianson. Dariusz Lee) HX THORACIC LAMINECTOMY  2019 S/P T10, T9, T8 laminectomy; T7, T8, T9, T10, T11, T12 posterior thoracic fusion; segmental spinal instrumentation; K2M Davis type, T7-T8, T11-T12 (2019 - Dr. Dacia Maguire)  HX TUBAL LIGATION    
 
 Barriers to Learning/Limitations: None Home Situation:  
Home Situation Home Environment: Private residence One/Two Story Residence: Other (Comment)(Condo) Living Alone: No 
Support Systems: Voodoo / agueda community, Family member(s) Patient Expects to be Discharged to[de-identified] Private residence Current DME Used/Available at Home: Glucometer, Nebulizer, Oxygen, portable(3 L per N/C) [x]     Right hand dominant   []     Left hand dominant Cognitive/Behavioral Status: 
 Alert, oriented X 4 Skin: no skin integrity issues noted during OT evaluation Edema: no UE edema noted Vision/Perceptual:   
  Tracking is [facility] Coordination: [facility] Strength: BUE 
4 to 4+/5 Tone & Sensation: BUE 
[facility] Range of Motion: BUE 
AROM [facility] Functional Mobility and Transfers for ADLs: 
Bed Mobility: 
 Modified independent rolling Transfers: 
 She uses a sliding board transfer at home (which she learned in rehab) ADL Assessment: 
 Self feeding: Independent Grooming: independent simulated at bed level UB bathing/dressing: independent LB bathing/dressing: mod assist without her equipment (she has a reacher and a sock aid) Pain: 
Pain level pre-treatment: 0/10 Pain level post-treatment: 0/10 Activity Tolerance:  
No SOB noted; she is reporting generalized fatigue Please refer to the flowsheet for vital signs taken during this treatment. After treatment:  
[]  Patient left in no apparent distress sitting up in chair 
[x]  Patient left in no apparent distress in bed 
[]  Call bell left within reach 
[]  Nursing notified 
[]  Caregiver present 
[]  Bed alarm activated COMMUNICATION/EDUCATION:  
[]      Role of Occupational Therapy in the acute care setting 
[x]      Home safety education was provided and the patient/caregiver indicated understanding. []      Patient/family have participated as able and agree with findings and recommendations. []      Patient is unable to participate in plan of care at this time. Thank you for this referral. 
Andres Porter, OTR/L Time Calculation: 12 mins Eval Complexity: History: LOW Complexity : Brief history review ; Examination: LOW Complexity : 1-3 performance deficits relating to physical, cognitive , or psychosocial skils that result in activity limitations and / or participation restrictions ; Decision Making:LOW Complexity : No comorbidities that affect functional and no verbal or physical assistance needed to complete eval tasks

## 2020-01-19 NOTE — PROGRESS NOTES
PROGRESS NOTE 
 
 
 
 LOS: 2 days ASSESSMENT:  
 
Nirmal Trevino is a 62 y.o. female who is being seen for: follow up assessment of her back. Patient is s/p laminectomy in Dec 2019 who presented to  ED on 1/17/19 with confusion x1 day. She was just recently discharged from a rehab facility on 1/14/19 where she was recovering from her spinal surgery. She complains of back pain but this is unchanged from previous chronic back pain. PLAN:  
 
 
1)  Pain management - per pain protocol 2)  Antibiotics:  None 3)  Incentive Spirometry 4)  PWB with walker/assistance 5)  DVT prophylaxis:  Per protocol 6)  Internal Medicine following for medical management 7)  Dr. Solitario Branch will see patient SUBJECTIVE:  
 
 
Patient seen and evaluated. Doing well. No new complaints. Patient complains of back pain. She is resting comfortably, NAD. She is requesting jello for breakfast 
Denies: CP, SOB, ABD PAIN, Calf pain OBJECTIVE:  
 
Visit Vitals /70 (BP 1 Location: Left arm, BP Patient Position: At rest) Pulse 99 Temp 98.2 °F (36.8 °C) Resp 20 Wt 165 lb 4.8 oz (75 kg) LMP 11/25/2012 (Exact Date) SpO2 96% Breastfeeding No  
BMI 27.51 kg/m²  
 
 
speech normal in context and clarity 
memory intact grossly Alert, Oriented x 3 in bed CHEST/ABDOMEN: Observation reveals: No audible wheezing from mouth. No accessory use of chest muscles during breathing. Non tender abdomen Examination of BLE Extremities:        No significant edema or embolic phenomena to the foot/toes or hands cap refill < 2 seconds. Sensory grossly intact Motor intact with weakness noted NV intact Lower extremities are warm and appear well perfused Neg calf tenderness nor evidence DVT Spine: Healed incision. No drainage Labs: CBC Lab Results Component Value Date/Time WBC 12.1 01/18/2020 08:01 AM  
 RBC 4.38 01/18/2020 08:01 AM  
 HCT 38.8 01/18/2020 08:01 AM  
 MCV 88.6 01/18/2020 08:01 AM  
 MCH 29.0 01/18/2020 08:01 AM  
 MCHC 32.7 01/18/2020 08:01 AM  
 RDW 17.8 (H) 01/18/2020 08:01 AM  
 
 
 
Coagulation Lab Results Component Value Date INR 1.2 10/31/2016 APTT 86.3 (H) 11/02/2016 Basic Metabolic Profile Lab Results Component Value Date  01/19/2020 CO2 29 01/19/2020 BUN 14 01/19/2020 Recent Results (from the past 24 hour(s)) GLUCOSE, POC Collection Time: 01/19/20 12:02 AM  
Result Value Ref Range Glucose (POC) 210 (H) 70 - 110 mg/dL METABOLIC PANEL, BASIC Collection Time: 01/19/20  2:42 AM  
Result Value Ref Range Sodium 136 136 - 145 mmol/L Potassium 3.7 3.5 - 5.5 mmol/L Chloride 101 100 - 111 mmol/L  
 CO2 29 21 - 32 mmol/L Anion gap 6 3.0 - 18 mmol/L Glucose 130 (H) 74 - 99 mg/dL BUN 14 7.0 - 18 MG/DL Creatinine 0.92 0.6 - 1.3 MG/DL  
 BUN/Creatinine ratio 15 12 - 20 GFR est AA >60 >60 ml/min/1.73m2 GFR est non-AA >60 >60 ml/min/1.73m2 Calcium 8.7 8.5 - 10.1 MG/DL Temp Readings from Last 3 Encounters:  
01/19/20 98.2 °F (36.8 °C)  
01/17/20 98.2 °F (36.8 °C)  
01/17/20 97.3 °F (36.3 °C) All Micro Results Procedure Component Value Units Date/Time CULTURE, BLOOD [[id number]] Collected:  01/17/20 1935 Order Status:  Completed Specimen:  Blood Updated:  01/19/20 3292 Special Requests: NO SPECIAL REQUESTS Culture result: NO GROWTH 2 DAYS     
 CULTURE, BLOOD [[id number]] Collected:  01/17/20 2102 Order Status:  Completed Specimen:  Blood Updated:  01/19/20 6412 Special Requests: --     
  NO SPECIAL REQUESTS 
LEFT 
FOREARM Culture result: NO GROWTH 2 DAYS INFLUENZA A & B AG (RAPID TEST) [[id number]] Collected:  01/17/20 2049  Order Status:  Completed Specimen:  Nasopharyngeal from Nasal washing Updated:  01/17/20 2130 Influenza A Antigen NEGATIVE Comment: A negative result does not exclude influenza virus infection, seasonal or H1N1 due to suboptimal sensitivity. If influenza is circulating in your community, a diagnosis of influenza should be considered based on a patients clinical presentation and empiric antiviral treatment should be considered, if indicated. Influenza B Antigen NEGATIVE Daria So PA-C

## 2020-01-20 NOTE — PROGRESS NOTES
PHYSICAL THERAPY EVALUATION AND DISCHARGE Patient: Quentin Sherwood (65 y.o. female) Date: 1/20/2020 Primary Diagnosis: Sepsis ([facility] [address] [A41.9] Precautions: falls PLOF: She has aid who assists with sliding board transfers/ADL's. Pt was receiving home health as she was recently discharged from inpatient rehab. ASSESSMENT : Patient received in bed and agreeable to PT evaluation/treatment. She is able to correctly recall spinal precautions. Pt needs mod A for bed mobility to manage BLE's, secondary to weakness. She has good sitting balance, c/o moderate back pain. Pt performs sliding board transfers with mod A, to block BLE's and reposition LE's. Pt reports fatigue and needs 2-4 minutes rest break sitting in wheelchair. She is able to use slinging board and return to bed with mod A. Patient repositioned in bed with max A x2. She seems to be at her baseline level, therefore will be discharged from PT caseload. Pt has daily caregivers/aids that assist with self-care and ADL's. PLAN : 
Recommendations and Planned Interventions:  
No formal PT needs identified at this time. Discharge Recommendations: Home Health with 24/7 care Further Equipment Recommendations for Discharge: N/A; pt has WC, sliding board SUBJECTIVE:  
Patient stated My feet hurt and my toes will curl up .  OBJECTIVE DATA SUMMARY:  
 
Past Medical History:  
Diagnosis Date Abnormally low high density lipoprotein (HDL) cholesterol with hypertriglyceridemia Lipid profile (11/6/2016) showed , , HDL 38, LDL 37 Acute blood loss as cause of postoperative anemia 12/12/2019 Anxiety Bipolar affective disorder ([facility] [address] 12/5/2012 Cellulitis of right forearm 05/04/2017 Chronic back pain Chronic obstructive pulmonary disease (COPD) ([facility] [address] SOB, on paula O2 Recent admission with psychosis Chronic respiratory failure with hypoxia (HCC)  On home oxygen inhalation at 3 LPM via NC  
 Contusion of left elbow 2017 Depression Diabetic neuropathy associated with type 2 diabetes mellitus ([address] Diastolic dysfunction without heart failure Stable on diuretics Falls frequently Gastroesophageal reflux disease with hiatal hernia Generalized osteoarthritis of multiple sites Gout On Allopurinol History of acute renal failure 2013 History of back injury   
 jumped out of second story window History of fracture due to fall 2019 History of hepatitis C   
 treated History of penicillin allergy History of vitamin D deficiency 5/10/2017 Hypertensive heart disease without heart failure Better controlled Hyperuricemia 2017 Hypothyroidism Hypoxemia requiring supplemental oxygen 2014 Intravenous drug user 2017 Long term current use of aspirin Memory difficulty Menopause Mixed connective tissue disease ([address] 5/10/2017 Obesity, Class I, BMI 30-34.9 Olecranon bursitis of right elbow 2017 Opioid dependence ([address] On Methadone Recurrent genital herpes 2013 Right Achilles tendinitis 2017 Sarcoidosis Sickle cell trait ([address] Tobacco use disorder Type 2 diabetes mellitus with diabetic neuropathy, with long-term current use of insulin ([address] HbA1c (2019) = 7.1 Urge urinary incontinence 5/10/2017 Venous insufficiency Wears glasses Past Surgical History:  
Procedure Laterality Date [address] HX  SECTION    
 HX CYST REMOVAL Left  Left foot HX OTHER SURGICAL Left 2017 S/P incision and drainage of the abscess of the left hand and the left foot (2017 - Dr. Josh Christianson. Dariusz Lee) HX THORACIC LAMINECTOMY  2019 S/P T10, T9, T8 laminectomy; T7, T8, T9, T10, T11, T12 posterior thoracic fusion; segmental spinal instrumentation; K2M Davis type, T7-T8, T11-T12 (2019 - Dr. Dacia Maguire) HX TUBAL LIGATION Barriers to Learning/Limitations: None Compensate with: N/A Home Situation:  
Home Situation Home Environment: Private residence One/Two Story Residence: Other (Comment)(Condo) Living Alone: No 
Support Systems: Yazidi / agueda community, Family member(s) Patient Expects to be Discharged to[de-identified] Private residence Current DME Used/Available at Home: Glucometer, Nebulizer, Oxygen, portable(3 L per N/C) Critical Behavior: 
Neurologic State: Alert Orientation Level: Oriented X4 Cognition: Follows commands Psychosocial 
Patient Behaviors: Demanding Purposeful Interaction: Yes 
Caring Interventions: Reassure Strength:   
Strength: Generally decreased, functional(BLE) Tone & Sensation:  
Tone: Normal 
Sensation: Intact Range Of Motion: 
AROM: Generally decreased, functional(BLE) Functional Mobility: 
Bed Mobility: 
Supine to Sit: Moderate assistance Sit to Supine: Moderate assistance Transfers: 
Sliding Board : Moderate assistance Balance:  
Sitting: Intact Pain: 
Pt reports she has back pain and foot pain, but RN said she is not due for pain medication. Activity Tolerance:  
Good Please refer to the flowsheet for vital signs taken during this treatment. After treatment:  
[]         Patient left in no apparent distress sitting up in chair 
[x]         Patient left in no apparent distress in bed 
[x]         Call bell left within reach [x]         Nursing notified 
[]         Caregiver present 
[]         Bed alarm activated 
[]         SCDs applied COMMUNICATION/EDUCATION:  
[x]         Role of Physical Therapy in the acute care setting. [x]         Fall prevention education was provided and the patient/caregiver indicated understanding. [x]         Patient/family have participated as able in goal setting and plan of care. [x]         Patient/family agree to work toward stated goals and plan of care. []         Patient understands intent and goals of therapy, but is neutral about his/her participation. []         Patient is unable to participate in goal setting/plan of care: ongoing with therapy staff. 
[]         Other: Thank you for this referral. 
Gaviota Morris, PT Time Calculation: 45 mins Eval Complexity: History: MEDIUM  Complexity : 1-2 comorbidities / personal factors will impact the outcome/ POC Exam:MEDIUM Complexity : 3 Standardized tests and measures addressing body structure, function, activity limitation and / or participation in recreation  Presentation: MEDIUM Complexity : Evolving with changing characteristics  Clinical Decision Making:Medium Complexity    Overall Complexity:MEDIUM

## 2020-01-20 NOTE — PROGRESS NOTES
Sw left a message for pt's  to advise of pt's dc to home on 1/14. No further needs are noted at this time.

## 2020-01-20 NOTE — ROUTINE PROCESS
Verbal bedside shift report given to Kerry Myers, RN on coming nurse by Mae Lopez, RN off going nurse including SBAR, KARDEX and [facility]

## 2020-01-20 NOTE — PROGRESS NOTES
Hospitalist Progress Note Patient: Hasmukh Molina Age: 62 y.o. : 1955 MR#: [id number] SSN: [id number] Date/Time: 2020 10:20 AM 
 
DOA: 2020 PCP: Isaias Wren NP Subjective:  
 
Feels better, has more cough and sputum today. No fever. Still wheezing. No confusion event. CT chest pending No chest pain ON iv antibiotics. ROS: no fever/chills, no headache, no dizziness, no facial pain, no sinus congestion, No swallowing pain, No chest pain, no palpitation, + shortness of breath, no abd pain, No diarrhea, no urinary complaint, no leg pain or swelling Assessment/Plan: 1. Acute metabolic encephalopathy, likely related to pneumonia, sepsis, RESOLVED 2. Sepsis POA (fever 101.2F, leukocytosis, tachycardia, tachypnea, pna), RESOLVED 3. Acute on chronic hypoxic respiratory failure, IMPROVED 4. Acute COPD exacerbation, resolving 5. Suspected pneumonia, vs viral bronchitis, CT chest follow up 6. Sarcoidosis, on chronic steroid 7. GERD, stable 8. Chronic diastolic heart failure, not decompensated. 9.  Hypertension 10. DM2, stable 11. Hypothyroidism 12. Sickle cell trait with normocytic anemia 13. Obesity 14. Opioid dependence on methadone 15. Leukocytosis, resolved 16. Thrombocytosis, stable 17. Chronic back pain with recent S/P T10, T9, T8 laminectomy; T7, T8, T9, T10, T11, T12 posterior thoracic fusion; segmental spinal instrumentation; K2M Davis type, T7-T8, T11-T12 (2019 - Dr. Savi Novak) 18. Depression, h/o bipolar that she has been discontinue Depakote Cont antibiotic, ICS, follow up on blood Cx. CT chest follow up Elevated proCalcitonin Cont with bronchodilator, budesonide, oral prednisone Normal proBNP, cont checking CBC, BMP tomorrow ISS and resume lantus dosing Reconciled med, she does not take depakote and not on trazodone, but still on zoloft Spine surgery sign off, f/u outpatient PT/OT  
PPI  
 
 Full code Additional Notes:   
Time spent >30 minutes Case discussed with:  [x]Patient  [x]Family  [x]Nursing  [x]Case Management DVT Prophylaxis:  []Lovenox  [x]Hep SQ  []SCDs  []Coumadin   []On Heparin gtt Signed By: Scott Dunbar MD   
 2020 10:20 AM  
  
 
 
 
 
Objective:  
VS:  
Visit Vitals /74 (BP 1 Location: Right arm, BP Patient Position: At rest) Pulse 70 Temp 97.6 °F (36.4 °C) Resp 20 Wt 75 kg (165 lb 4.8 oz) LMP 2012 (Exact Date) SpO2 100% Breastfeeding No  
BMI 27.51 kg/m² Tmax/24hrs: Temp (24hrs), Av.9 °F (36.6 °C), Min:97 °F (36.1 °C), Max:99.1 °F (37.3 °C) Intake/Output Summary (Last 24 hours) at 2020 1020 Last data filed at 2020 2171 Gross per 24 hour Intake 240 ml Output 700 ml Net -460 ml Tele:  
General:  Cooperative, Not in acute distress, speaks in short sentence while in bed HEENT: PERRL, EOMI, supple neck, no JVD, dry oral mucosa Cardiovascular: S1S2 regular, no rub/gallop Pulmonary: air entry bilaterally, +++ wheezing, no crackle GI:  Soft, non tender, non distended, +bs, no guarding Extremities:  No pedal edema, +distal pulses appreciated Neuro: AOx3, moving all extremities, no gross deficit. Additional:  
 
 
Current Facility-Administered Medications Medication Dose Route Frequency  VANCOMYCIN TROUGH DUE   Other Daily  cefepime (MAXIPIME) 2 g in sterile water (preservative free) 10 mL IV syringe  2 g IntraVENous Q12H  levothyroxine (SYNTHROID) tablet 100 mcg  100 mcg Oral 6am  
 methadone (DOLOPHINE) tablet 20 mg  20 mg Oral DAILY  budesonide (PULMICORT) 500 mcg/2 ml nebulizer suspension  500 mcg Nebulization BID RT  
 predniSONE (DELTASONE) tablet 40 mg  40 mg Oral DAILY WITH BREAKFAST  famotidine (PEPCID) tablet 20 mg  20 mg Oral BID  albuterol-ipratropium (DUO-NEB) 2.5 MG-0.5 MG/3 ML  3 mL Nebulization Q6H RT  
  oxyCODONE IR (ROXICODONE) tablet 5 mg  5 mg Oral Q4H PRN  
 naloxone (NARCAN) injection 0.4 mg  0.4 mg IntraVENous EVERY 2 MINUTES AS NEEDED  
 sodium chloride (NS) flush 5-40 mL  5-40 mL IntraVENous Q8H  
 sodium chloride (NS) flush 5-40 mL  5-40 mL IntraVENous PRN  
 diphenhydrAMINE (BENADRYL) injection 25 mg  25 mg IntraVENous Q4H PRN  
 heparin (porcine) injection 5,000 Units  5,000 Units SubCUTAneous Q8H  
 insulin lispro (HUMALOG) injection   SubCUTAneous AC&HS  
 glucose chewable tablet 16 g  4 Tab Oral PRN  
 glucagon (GLUCAGEN) injection 1 mg  1 mg IntraMUSCular PRN  
 albuterol (PROVENTIL VENTOLIN) nebulizer solution 2.5 mg  2.5 mg Nebulization Q4H PRN  
 aspirin chewable tablet 81 mg  81 mg Oral DAILY WITH BREAKFAST  ferrous sulfate tablet 325 mg  325 mg Oral DAILY WITH BREAKFAST  insulin glargine (LANTUS) injection 40 Units  40 Units SubCUTAneous ACB  rosuvastatin (CRESTOR) tablet 20 mg  20 mg Oral QHS  dextrose 10% infusion 125-250 mL  125-250 mL IntraVENous PRN  
 sodium chloride (NS) flush 5-10 mL  5-10 mL IntraVENous PRN  
 vancomycin (VANCOCIN) 1,000 mg in 0.9% sodium chloride (MBP/ADV) 250 mL adv  1,000 mg IntraVENous Q12H Lab/Data Review: 
Labs: Results:  
   
Chemistry Recent Labs  
  01/20/20 
0540 01/19/20 
0242 01/18/20 
0801 01/18/20 
1730 * 130*  --  91  136  --  140  
K 3.9 3.7  --  3.9  101  --  105 CO2 26 29  --  30 BUN 14 14  --  13  
CREA 0.84 0.92  --  0.83 BUCR 17 15  --  16 AGAP 8 6  --  5  
CA 8.8 8.7  --  8.8 PHOS  --   --  4.0  --   
 
Recent Labs  
  01/18/20 
0801 01/17/20 
1935 ALT 16 26 SGOT 16 15  
TP 6.6 8.2 ALB 2.6* 3.6 GLOB 4.0 4.6* AGRAT 0.7* 0.8 CBC w/Diff Recent Labs  
  01/20/20 
0540 01/18/20 
0801 01/17/20 
1935 WBC 8.9 12.1 16.1*  
RBC 3.66* 4.38 4.97  
HGB 10.3* 12.7 14.8 HCT 32.0* 38.8 43.8 MCV 87.4 88.6 88.1 MCH 28.1 29.0 29.8 MCHC 32.2 32.7 33.8 RDW 16.7* 17.8* 17.7* PLT 83* 77* 99* GRANS  --  74* 76* LYMPH  --  17* 17* EOS  --  0 0 Coagulation No results for input(s): PTP, INR, APTT, INREXT, INREXT in the last 72 hours. Iron/Ferritin Lab Results Component Value Date/Time Iron 11 (L) 12/13/2019 05:45 AM  
 TIBC 215 (L) 12/13/2019 05:45 AM  
 Iron % saturation 5 12/13/2019 05:45 AM  
 Ferritin 146 12/13/2019 05:45 AM  
   
BNP Cardiac Enzymes Lab Results Component Value Date/Time  12/10/2019 05:17 PM  
 CK - MB 2.4 02/17/2019 01:30 PM  
 CK-MB Index 1.4 02/17/2019 01:30 PM  
 Troponin-I, QT <0.02 10/17/2019 06:26 PM  
 
  
Lactic Acid Thyroid Studies All Micro Results Procedure Component Value Units Date/Time CULTURE, BLOOD [[id number]] Collected:  01/17/20 1935 Order Status:  Completed Specimen:  Blood Updated:  01/20/20 5695 Special Requests: NO SPECIAL REQUESTS Culture result: NO GROWTH 3 DAYS     
 CULTURE, BLOOD [[id number]] Collected:  01/17/20 2102 Order Status:  Completed Specimen:  Blood Updated:  01/20/20 7026 Special Requests: --     
  NO SPECIAL REQUESTS 
LEFT 
FOREARM Culture result: NO GROWTH 3 DAYS     
 CULTURE, RESPIRATORY/SPUTUM/BRONCH Alfredo Congo STAIN [[id number]] Order Status:  Sent Specimen:  Sputum LEGIONELLA PNEUMOPHILA AG, URINE [[id number]] Collected:  01/18/20 0801 Order Status:  Completed Specimen:  Urine, random Updated:  01/19/20 1704 Legionella Ag, urine NEGATIVE STREP PNEUMO AG, URINE [[id number]] Collected:  01/18/20 0801 Order Status:  Completed Specimen:  Urine, random Updated:  01/19/20 1704 Strep pneumo Ag, urine NEGATIVE      
 INFLUENZA A & B AG (RAPID TEST) [[id number]] Collected:  01/17/20 2049 Order Status:  Completed Specimen:  Nasopharyngeal from Nasal washing Updated:  01/17/20 2130 Influenza A Antigen NEGATIVE   Comment: A negative result does not exclude influenza virus infection, seasonal or H1N1 due to suboptimal sensitivity. If influenza is circulating in your community, a diagnosis of influenza should be considered based on a patients clinical presentation and empiric antiviral treatment should be considered, if indicated. Influenza B Antigen NEGATIVE Images: 
 
CT (Most Recent). XRAY (Most Recent) XR Results (most recent): 
Results from Hospital Encounter encounter on 01/17/20 XR CHEST PORT Narrative AP CHEST, PORTABLE 
 
CPT CODE: 55426 INDICATION: Above. Pneumonia. COMPARISON: 1/17/2020. TECHNIQUE: Portable  70 degree upright AP chest radiograph is reviewed. FINDINGS: 
 
Advanced pulmonary emphysematous disease with fibrotic changes, as better 
demonstrated on prior chest CT of 2/6/2018. Diffuse prominence of the 
interstitial markings without significant interval change compared to the 
preceding radiograph. Findings are similar to minimally slightly more prominent 
compared to earlier chest radiographs, for example reference radiograph of 
12/7/2019, questionable for possible pulmonary vascular congestion with mild 
predominantly interstitial pulmonary edema superimposed upon predominantly 
chronic lung disease, though the findings are favored to be largely chronic. No evidence of focal pulmonary consolidation. No evidence of pneumothorax. The 
costophrenic sulci appear sharp. The cardiac silhouette is within normal limits in size  for technique. EKG leads/wires overlie the patient. Bilateral thoracic spinal fusion hardware 
again noted. Impression IMPRESSION:  
 
No significant interval change compared to the preceding radiograph as 
described. EKG No results found for this or any previous visit. 2D ECHO 12/10/19 ECHO ADULT FOLLOW-UP OR LIMITED 12/12/2019 12/12/2019 Narrative · Left Ventricle: Small left ventricle. Mild concentric hypertrophy. Estimated left ventricular ejection fraction is 66 - 70%. No regional wall  
motion abnormality noted. · Pulmonary Artery: Mild pulmonary hypertension. Pulmonary arterial  
systolic pressure is 39 mmHg.  
   
  Signed by: Roland Luz MD

## 2020-01-20 NOTE — ROUTINE PROCESS
Bedside shift change report received from [facility]. Report included SBAR, Kardex, MAR, Recent Results, and Plan of Care. Pt in bed with call light in reach.

## 2020-01-20 NOTE — DIABETES MGMT
GLYCEMIC CONTROL PLAN OF CARE Assessment/Recommendations: 
Pt is a 62year old with a past medical history significant for COPD, type 2 diabetes, diabetic neuropathy, and hypertensive heart disease. Blood glucose fluctuating above targets, pt has order for basal and correctional insulin coverage. Pt receives prednisone 40 mg daily with breakfast. Monitor need to advance to the very insulin resistant correctional Lispro scale. Will continue inpatient monitoring and intervention. Most recent blood glucose values: 
1/19/2020 15:53 1/19/2020 20:34 1/20/2020 08:18 1/20/2020 11:04  
207 (H) 222 (H) 130 (H) 150 (H) Current A1C of 7.1% is equivalent to average blood glucose of 157 mg/dl over the past 2-3 months. Current hospital diabetes medications:  
Lantus insulin 40 units daily before breakfast  
Correctional Lispro insulin 4 times daily ACHS Previous day's insulin requirements:  
40 units of Lantus insulin 10 units of Lispro insulin Home diabetes medications: 
Correctional Lispro insulin per sliding scale Lantus insulin 40 units daily Diet:  Diabetic consistent carbohydrate, FR 2000 ML Education:  __x__Refer to Diabetes Education Record (12/12/19)  
          ____Education not indicated at this time Sugey Mcpherson RD, CDE

## 2020-01-20 NOTE — PROGRESS NOTES
Reason for Admission:   Sepsis ([facility] [address] [A41.9] RRAT Score:     31 
          
Resources/supports as identified by patient/family:    
 
Top Challenges facing patient (as identified by patient/family and CM): Finances/Medication cost?     No needs identified Transportation      family Support system or lack thereof?   family Living arrangements? Daughter stays with patient Self-care/ADLs/Cognition? Has personal care dailu Current Advanced Directive/Advance Care Plan:   no 
                       
Plan for utilizing home health:    yes Likelihood of readmission:   HIGH Transition of Care Plan:               
 
 
Initial assessment completed with patient. Cognitive status of patient: oriented to time, place, person and situation. Face sheet information confirmed:  yes. The patient designates Daughter Chau Shea [phone] to participate in her discharge plan and to receive any needed information. This patient lives in a single family home with patient and daughter. Patient is able to navigate steps as needed. Prior to hospitalization, patient was considered to be independent with ADLs/IADLS : no . If not independent,  patient needs assist with : bathing, food preparation, cooking and toileting. PATIENT HAS PERSONAL CARE 9-3 DAILY FROM \"[facility]\" Patient has a current ACP document on file: no The patient and daughter will be available to transport patient home upon discharge. The patient already has W/C, BSC, and Oxygen tanks and concentrator from [facility] medical equipment available in the home. Patient is currently active with home health. If active, agency name is [facility]. Patient has stayed in a skilled nursing facility or rehab. Was  stay within last 60 days : yes. PATIENT WAS AT ARU This patient is on dialysis :no 
 
List of available Home Health agencies were provided and reviewed with the patient prior to discharge. Freedom of choice signed: yes, for [facility]. Currently, the discharge plan is Home with  [facility]. The patient states that she can obtain her medications from the pharmacy, and take her medications as directed. Patient's current insurance is [facility] Medicaid. Care Management Interventions PCP Verified by CM: Yes Mode of Transport at Discharge: Self Physical Therapy Consult: Yes Occupational Therapy Consult: Yes Current Support Network: Lives with Caregiver Confirm Follow Up Transport: Family The Plan for Transition of Care is Related to the Following Treatment Goals : home health The Patient and/or Patient Representative was Provided with a Choice of Provider and Agrees with the Discharge Plan?: Yes Freedom of Choice List was Provided with Basic Dialogue that Supports the Patient's Individualized Plan of Care/Goals, Treatment Preferences and Shares the Quality Data Associated with the Providers?: Yes Discharge Location Discharge Placement: Home with home health Bandar Diaz RN BSN Care Manager [phone]

## 2020-01-20 NOTE — CONSULTS
Consult Patient: Rosanna Jackson MRN: [id number]  SSN: [id number] YOB: 1955  Age: 62 y.o. Sex: female Subjective:  
  
Rosanna Jackson is a 62 y.o. female who is being seen for fever, change in mental status. Patient a COPD, IDDM, Sarcoid patient, on steroids who a month ago suffered a compression fracture with paraparesis. Underwent a thoracic lami fusion with return of motor and sensation, though after a month of rehab is still not ambulatory. Was DC' from the ARU last week and 4 days later represented with what appears to have been a exacerbation of COPD. Had elevated WBC, subtle changes in CXR. Patient improved rapidly with abx. She has chronic back pain. The wound has been healed and dry. Patient denies any acute change in back symptoms. Motor in LE 3/5. Sensation intact. Past Medical History:  
Diagnosis Date  Abnormally low high density lipoprotein (HDL) cholesterol with hypertriglyceridemia Lipid profile (11/6/2016) showed , , HDL 38, LDL 37  Acute blood loss as cause of postoperative anemia 12/12/2019  Anxiety  Bipolar affective disorder ([address] 12/5/2012  Cellulitis of right forearm 05/04/2017  Chronic back pain  Chronic obstructive pulmonary disease (COPD) ([address] SOB, on [place] O2 Recent admission with psychosis  Chronic respiratory failure with hypoxia ([address] On home oxygen inhalation at 3 LPM via NC  
 Contusion of left elbow 5/4/2017  Depression  Diabetic neuropathy associated with type 2 diabetes mellitus ([address]  Diastolic dysfunction without heart failure Stable on diuretics  Falls frequently  Gastroesophageal reflux disease with hiatal hernia  Generalized osteoarthritis of multiple sites  Gout On Allopurinol  History of acute renal failure 5/31/2013  History of back injury   
 jumped out of second story window  History of fracture due to fall 12/11/2019  History of hepatitis C   
 treated  History of penicillin allergy  History of vitamin D deficiency 5/10/2017  Hypertensive heart disease without heart failure Better controlled  Hyperuricemia 2017  Hypothyroidism  Hypoxemia requiring supplemental oxygen 2014  Intravenous drug user 2017  Long term current use of aspirin  Memory difficulty  Menopause  Mixed connective tissue disease ([facility] 5/10/2017  Obesity, Class I, BMI 30-34.9  Olecranon bursitis of right elbow 2017  Opioid dependence ([facility] On Methadone  Recurrent genital herpes 2013  Right Achilles tendinitis 2017  Sarcoidosis  Sickle cell trait ([facility]  Tobacco use disorder  Type 2 diabetes mellitus with diabetic neuropathy, with long-term current use of insulin ([facility] HbA1c (2019) = 7.1  Urge urinary incontinence 5/10/2017  Venous insufficiency  Wears glasses Past Surgical History:  
Procedure Laterality Date [facility]  HX  SECTION    
 HX CYST REMOVAL Left  Left foot  HX OTHER SURGICAL Left 2017 S/P incision and drainage of the abscess of the left hand and the left foot (2017 - Dr. Renetta Erazo)  HX THORACIC LAMINECTOMY  2019 S/P T10, T9, T8 laminectomy; T7, T8, T9, T10, T11, T12 posterior thoracic fusion; segmental spinal instrumentation; K2M [place] type, T7-T8, T11-T12 (2019 - Dr. Steven Saba)  HX TUBAL LIGATION Family History Problem Relation Age of Onset  Seizures Other  Diabetes Mother  Hypertension Mother  Heart Disease Mother  Cancer Mother  Diabetes Father  Stroke Father  Heart Disease Father  Headache Sister  Depression Neg Hx  Suicide Neg Hx  Psychotic Disorder Neg Hx  Substance Abuse Neg Hx  Dementia Neg Hx Social History Tobacco Use  
  Smoking status: Current Every Day Smoker Packs/day: 1.00 Years: 30.00 Pack years: 30.00 Types: Cigarettes Start date: 12/2/1969  Smokeless tobacco: Never Used Substance Use Topics  Alcohol use: No  
  
Current Facility-Administered Medications Medication Dose Route Frequency Provider Last Rate Last Dose  VANCOMYCIN TROUGH DUE   Other Daily Gretchen Ronnie M, DO      
 cefepime (MAXIPIME) 2 g in sterile water (preservative free) 10 mL IV syringe  2 g IntraVENous Q12H Demetris Greer MD   2 g at 01/19/20 2142  levothyroxine (SYNTHROID) tablet 100 mcg  100 mcg Oral Kat Brook Miller MD   100 mcg at 01/20/20 7874  methadone (DOLOPHINE) tablet 20 mg  20 mg Oral DAILY Brook Butler MD   20 mg at 01/19/20 1139  
 budesonide (PULMICORT) 500 mcg/2 ml nebulizer suspension  500 mcg Nebulization BID RT Demetris Greer MD   500 mcg at 01/19/20 2004  predniSONE (DELTASONE) tablet 40 mg  40 mg Oral DAILY WITH Brook Interiano MD      
 famotidine (PEPCID) tablet 20 mg  20 mg Oral BID Demetris Greer MD   20 mg at 01/19/20 3881  albuterol-ipratropium (DUO-NEB) 2.5 MG-0.5 MG/3 ML  3 mL Nebulization Q6H RT Demetris Greer MD   3 mL at 01/19/20 2004  oxyCODONE IR (ROXICODONE) tablet 5 mg  5 mg Oral Q4H PRN Simon Creeks A, DO   5 mg at 01/20/20 0002  
 naloxone [facility]) injection 0.4 mg  0.4 mg IntraVENous EVERY 2 MINUTES AS NEEDED Simon Creeks A, DO      
 sodium chloride (NS) flush 5-40 mL  5-40 mL IntraVENous Q8H Simon Creeks A, DO   10 mL at 01/19/20 2143  sodium chloride (NS) flush 5-40 mL  5-40 mL IntraVENous PRN Simon Creeks A, DO      
 diphenhydrAMINE (BENADRYL) injection 25 mg  25 mg IntraVENous Q4H PRN Simon Creeks A, DO   25 mg at 01/18/20 1242  heparin (porcine) injection 5,000 Units  5,000 Units SubCUTAneous Q8H Simon RODRIGUEZ DO   Stopped at 01/19/20 2023  insulin lispro (HUMALOG) injection   SubCUTAneous AC&HS Simon Zapata A, DO   4 Units at 01/19/20 2138  
 glucose chewable tablet 16 g  4 Tab Oral PRN Authur Platts, DO      
 glucagon (GLUCAGEN) injection 1 mg  1 mg IntraMUSCular PRN Authur Platts, DO      
 albuterol (PROVENTIL VENTOLIN) nebulizer solution 2.5 mg  2.5 mg Nebulization Q4H PRN Cloteal Senate A, DO   2.5 mg at 01/19/20 0013  aspirin chewable tablet 81 mg  81 mg Oral DAILY WITH BREAKFAST Cloteal Senate A, DO   81 mg at 01/19/20 1315  ferrous sulfate tablet 325 mg  325 mg Oral DAILY WITH BREAKFAST Cloteal Senate A, DO   325 mg at 01/19/20 5647  insulin glargine (LANTUS) injection 40 Units  40 Units SubCUTAneous ACB Cloteal Senate A, DO   40 Units at 01/19/20 1523  rosuvastatin (CRESTOR) tablet 20 mg  20 mg Oral QHS Cloteal Senate A, DO   20 mg at 01/19/20 2142  dextrose 10% infusion 125-250 mL  125-250 mL IntraVENous PRN Alisa Escamilla MD      
 sodium chloride (NS) flush 5-10 mL  5-10 mL IntraVENous PRN Authur Platts, DO      
 vancomycin (VANCOCIN) 1,000 mg in 0.9% sodium chloride (MBP/ADV) 250 mL adv  1,000 mg IntraVENous Q12H Cloteal Senate A,  mL/hr at 01/20/20 0424 1,000 mg at 01/20/20 0424 Allergies Allergen Reactions  Moxifloxacin Rash  Pcn [Penicillins] Swelling  Sulfa (Sulfonamide Antibiotics) Swelling Review of Systems: 
Pertinent items are noted in the History of Present Illness. Objective:  
 
Vitals:  
 01/19/20 2006 01/19/20 2324 01/20/20 2687 01/20/20 0283 BP:  107/73 113/67 Pulse:  86 74 Resp:  20 20 Temp:  98 °F (36.7 °C) 97.4 °F (36.3 °C) SpO2: 97% 94% 95% Weight:    165 lb 4.8 oz (75 kg) Physical Exam: 
General:  Alert, cooperative, no distress, appears stated age. Eyes:  Conjunctivae/corneas clear. PERRL, EOMs intact. Fundi benign Ears:  Normal TMs and external ear canals both ears. Nose: Nares normal. Septum midline. Mucosa normal. No drainage or sinus tenderness. Mouth/Throat: Lips, mucosa, and tongue normal. Teeth and gums normal.  
Neck: Supple, symmetrical, trachea midline, no adenopathy, thyroid: no enlargment/tenderness/nodules, no carotid bruit and no JVD. Back:   Symmetric, no curvature. ROM normal. No CVA tenderness. Lungs:   Clear to auscultation bilaterally. Heart:  Regular rate and rhythm, S1, S2 normal, no murmur, click, rub or gallop. Abdomen:   Soft, non-tender. Bowel sounds normal. No masses,  No organomegaly. Extremities: Extremities normal, atraumatic, no cyanosis or edema. Pulses: 2+ and symmetric all extremities. Skin: Skin color, texture, turgor normal. No rashes or lesions Lymph nodes: Cervical, supraclavicular, and axillary nodes normal.  
Neurologic: CNII-XII intact. 3/5  Strength LE, sensation and reflexes throughout. Assessment:  
 
 
Patient with respiratory decompensation secondary to COPD. No evidence of spine issue. Patient however is at high risk given immunosuppressed status with IDDM, Steroid dependence for sarcoid. Plan:  
 
PT OT 
CT of chest being done, will also provide baseline for T spine Disposition per care management Fu in office in 2 weeks Will sign off Signed By: Cynthia Xiao MD   
 January 20, 2020

## 2020-01-20 NOTE — PROGRESS NOTES
conducted an initial consultation and Spiritual Assessment for Stephen Sinha, who is a 62 y.o.,female. Patients Primary Language is: [place]. According to the patients EMR Lutheran Affiliation is: [place]. The reason the Patient came to the hospital is:  
Patient Active Problem List  
 Diagnosis Date Noted  Bipolar affective disorder ([address] 12/05/2012 Priority: 1 - One  Sepsis ([address] 01/17/2020  Chronic respiratory failure with hypoxia ([address]  Gout  Menopause  Long term current use of aspirin  Opioid dependence ([address]  Tobacco use disorder  Acute blood loss as cause of postoperative anemia 12/12/2019  Impaired mobility and ADLs 12/11/2019  Spinal cord compression ([address] 12/11/2019  Compression fracture of body of thoracic vertebra (HCC) 12/11/2019  Status post laminectomy with spinal fusion 12/11/2019  
 History of fracture due to fall 12/11/2019  Hyperuricemia 05/26/2017  Mixed connective tissue disease ([address] 05/10/2017  History of vitamin D deficiency 05/10/2017  Urge urinary incontinence 05/10/2017  History of penicillin allergy  Falls frequently  Gastroesophageal reflux disease with hiatal hernia  Memory difficulty  Cellulitis of arm, right 05/04/2017  Olecranon bursitis of right elbow 05/04/2017  Contusion of left elbow 05/04/2017  Cellulitis of right forearm 05/04/2017  Intravenous drug user 05/02/2017  Right Achilles tendinitis 05/02/2017  Cellulitis and abscess of hand 04/13/2017  Cellulitis and abscess of foot 04/13/2017  Abnormal nuclear stress test 11/17/2016  Hypoxemia requiring supplemental oxygen 12/29/2014  Abscess of right arm 06/03/2013  History of acute renal failure 05/31/2013  Recurrent genital herpes 05/31/2013  Hypothyroidism  Sarcoidosis  Diastolic dysfunction without heart failure  Chronic obstructive pulmonary disease (COPD) ([address]  Hypertensive heart disease without heart failure  Depression  History of back injury  Type 2 diabetes mellitus with diabetic neuropathy, with long-term current use of insulin ([address]  Anxiety  Wears glasses  History of hepatitis C   
 Sickle cell trait ([address]  Abnormally low high density lipoprotein (HDL) cholesterol with hypertriglyceridemia  Generalized osteoarthritis of multiple sites  Diabetic neuropathy associated with type 2 diabetes mellitus ([address]  Venous insufficiency  Obesity, Class I, BMI 30-34.9  Chronic back pain The  provided the following Interventions: 
Initiated a relationship of care and support. Explored issues of agueda, belief, spirituality and Jainism/ritual needs while hospitalized. Listened empathically. Provided information about Spiritual Care Services. Offered prayer and assurance of continued prayers on patient's behalf. Chart reviewed. The following outcomes where achieved: 
Patient shared limited information about both their medical narrative and spiritual journey/beliefs.  confirmed Patient's Denominational Affiliation. Patient processed feeling about current hospitalization. Patient expressed gratitude for 's visit. Assessment: 
Patient's agueda in God is very important for her to cope. Patient does not have any Jainism/cultural needs that will affect patients preferences in health care. There are no spiritual or Jainism issues which require intervention at this time. Plan: 
Chaplains will continue to follow and will provide pastoral care on an as needed/requested basis.  recommends bedside caregivers page  on duty if patient shows signs of acute spiritual or emotional distress. [phone] [facility] Spiritual Care 
([phone])

## 2020-01-20 NOTE — ROUTINE PROCESS
Bedside shift change report given to JENNY Steinberg. Report included SBAR, Kardex, MAR, Recent Results, and Plan of Care. Pt in bed with call light in reach.

## 2020-01-21 NOTE — HOME CARE
Active to [facility] for SN, PT, and OT. Resumption of care orders needed prior to discharge. [facility] will continue to follow. Michael Bailey LPN  
[address] [facility] [phone]

## 2020-01-21 NOTE — PROGRESS NOTES
Pt off unit for testing. Report received from Erika Vitale RN. Per previous RN Erika Slot, patient was unable to get testing done due to infiltrated IV. CT tech sent patient back to unit.

## 2020-01-21 NOTE — PROGRESS NOTES
Hospitalist Progress Note Patient: Racquel Rea Age: 62 y.o. : 1955 MR#: [id number] SSN: [id number] Date/Time: 2020 9:12 AM 
 
DOA: 2020 PCP: Sabrina Edwards NP Subjective:  
 
CT chest has not been done. IV line unable last night. No chest pain. Cough persistent but better. Still wheezing. On IV antibiotics Back to baseline oxygen needs ROS: no fever/chills, no headache, no dizziness, no facial pain, no sinus congestion, No swallowing pain, No chest pain, no palpitation, + shortness of breath, no abd pain, No diarrhea, no urinary complaint, no leg pain or swelling Assessment/Plan: 1. Acute metabolic encephalopathy, likely related to pneumonia, sepsis, RESOLVED 2. Sepsis POA (fever 101.2F, leukocytosis, tachycardia, tachypnea, pna), RESOLVED 3. Acute on chronic hypoxic respiratory failure, back to baseline oxygen 4. Acute COPD exacerbation, resolving 5. Suspected pneumonia, vs viral bronchitis, CT chest follow up 6. Sarcoidosis, on chronic steroid 7. GERD, stable 8. Chronic diastolic heart failure, not decompensated. 9.  Hypertension 10. DM2, stable 11. Hypothyroidism 12. Sickle cell trait with normocytic anemia 13. Obesity 14. Opioid dependence on methadone 15. Leukocytosis, resolved 16. Thrombocytosis, stable 17. Chronic back pain with recent S/P T10, T9, T8 laminectomy; T7, T8, T9, T10, T11, T12 posterior thoracic fusion; segmental spinal instrumentation; K2M [place] type, T7-T8, T11-T12 (2019 - Dr. Nguyen Farley) 18. Depression, h/o bipolar that she has been discontinue Depakote No growth on blood, feels better today, CT chest pending. Continue IV antibiotic until CT chest, can wean to oral thereafter. Elevated proCalcitonin Cont with bronchodilator, budesonide, oral prednisone Normal proBNP, cont checking CBC, BMP tomorrow ISS and resume lantus dosing Reconciled med, she does not take depakote and not on trazodone, but still on zoloft Spine surgery sign off, f/u outpatient PT/OT  
PPI Full code Additional Notes:   
Time spent >30 minutes Case discussed with:  [x]Patient  [x]Family  [x]Nursing  [x]Case Management DVT Prophylaxis:  []Lovenox  [x]Hep SQ  []SCDs  []Coumadin   []On Heparin gtt Signed By: Lisbeth Mobley MD   
 2020 9:12 AM  
  
 
 
 
 
Objective:  
VS:  
Visit Vitals /72 (BP 1 Location: Right arm, BP Patient Position: At rest) Pulse 75 Temp 97.7 °F (36.5 °C) Resp 19 Wt 77.6 kg (171 lb) LMP 2012 (Exact Date) SpO2 97% Breastfeeding No  
BMI 28.46 kg/m² Tmax/24hrs: Temp (24hrs), Av.9 °F (36.6 °C), Min:97.2 °F (36.2 °C), Max:98.5 °F (36.9 °C) Intake/Output Summary (Last 24 hours) at 2020 3430 Last data filed at 2020 8080 Gross per 24 hour Intake 1200 ml Output 875 ml Net 325 ml Tele:  
General:  Cooperative, Not in acute distress, speaks in short sentence while in bed HEENT: PERRL, EOMI, supple neck, no JVD, dry oral mucosa Cardiovascular: S1S2 regular, no rub/gallop Pulmonary: air entry bilaterally, +wheezing, no crackle GI:  Soft, non tender, non distended, +bs, no guarding Extremities:  No pedal edema, +distal pulses appreciated Neuro: AOx3, moving all extremities, no gross deficit. Additional:  
 
 
Current Facility-Administered Medications Medication Dose Route Frequency  cefepime (MAXIPIME) 2 g in sterile water (preservative free) 10 mL IV syringe  2 g IntraVENous Q12H  levothyroxine (SYNTHROID) tablet 100 mcg  100 mcg Oral 6am  
 methadone (DOLOPHINE) tablet 20 mg  20 mg Oral DAILY  budesonide (PULMICORT) 500 mcg/2 ml nebulizer suspension  500 mcg Nebulization BID RT  
 predniSONE (DELTASONE) tablet 40 mg  40 mg Oral DAILY WITH BREAKFAST  famotidine (PEPCID) tablet 20 mg  20 mg Oral BID  
  albuterol-ipratropium (DUO-NEB) 2.5 MG-0.5 MG/3 ML  3 mL Nebulization Q6H RT  
 oxyCODONE IR (ROXICODONE) tablet 5 mg  5 mg Oral Q4H PRN  
 naloxone (NARCAN) injection 0.4 mg  0.4 mg IntraVENous EVERY 2 MINUTES AS NEEDED  
 sodium chloride (NS) flush 5-40 mL  5-40 mL IntraVENous Q8H  
 sodium chloride (NS) flush 5-40 mL  5-40 mL IntraVENous PRN  
 diphenhydrAMINE (BENADRYL) injection 25 mg  25 mg IntraVENous Q4H PRN  
 heparin (porcine) injection 5,000 Units  5,000 Units SubCUTAneous Q8H  
 insulin lispro (HUMALOG) injection   SubCUTAneous AC&HS  
 glucose chewable tablet 16 g  4 Tab Oral PRN  
 glucagon (GLUCAGEN) injection 1 mg  1 mg IntraMUSCular PRN  
 albuterol (PROVENTIL VENTOLIN) nebulizer solution 2.5 mg  2.5 mg Nebulization Q4H PRN  
 aspirin chewable tablet 81 mg  81 mg Oral DAILY WITH BREAKFAST  ferrous sulfate tablet 325 mg  325 mg Oral DAILY WITH BREAKFAST  insulin glargine (LANTUS) injection 40 Units  40 Units SubCUTAneous ACB  rosuvastatin (CRESTOR) tablet 20 mg  20 mg Oral QHS  dextrose 10% infusion 125-250 mL  125-250 mL IntraVENous PRN  
 sodium chloride (NS) flush 5-10 mL  5-10 mL IntraVENous PRN  
 vancomycin (VANCOCIN) 1,000 mg in 0.9% sodium chloride (MBP/ADV) 250 mL adv  1,000 mg IntraVENous Q12H Lab/Data Review: 
Labs: Results:  
   
Chemistry Recent Labs  
  01/21/20 
0130 01/20/20 
0540 01/19/20 
0242 * 205* 130*  138 136  
K 4.2 3.9 3.7  104 101 CO2 27 26 29 BUN 21* 14 14 CREA 0.85 0.84 0.92  
BUCR 25* 17 15 AGAP 5 8 6 CA 9.0 8.8 8.7 No results for input(s): TBIL, ALT, SGOT, ALKP, TP, ALB, GLOB, AGRAT in the last 72 hours. CBC w/Diff Recent Labs  
  01/21/20 
0130 01/20/20 
0540 WBC 8.5 8.9  
RBC 3.80* 3.66* HGB 10.9* 10.3* HCT 33.9* 32.0* MCV 89.2 87.4 MCH 28.7 28.1 MCHC 32.2 32.2 RDW 16.6* 16.7*  
* 83* Coagulation No results for input(s): PTP, INR, APTT, INREXT, INREXT in the last 72 hours. Iron/Ferritin Lab Results Component Value Date/Time Iron 11 (L) 12/13/2019 05:45 AM  
 TIBC 215 (L) 12/13/2019 05:45 AM  
 Iron % saturation 5 12/13/2019 05:45 AM  
 Ferritin 146 12/13/2019 05:45 AM  
   
BNP Cardiac Enzymes Lab Results Component Value Date/Time  12/10/2019 05:17 PM  
 CK - MB 2.4 02/17/2019 01:30 PM  
 CK-MB Index 1.4 02/17/2019 01:30 PM  
 Troponin-I, QT <0.02 10/17/2019 06:26 PM  
 
  
Lactic Acid Thyroid Studies All Micro Results Procedure Component Value Units Date/Time CULTURE, BLOOD [[id number]] Collected:  01/17/20 1935 Order Status:  Completed Specimen:  Blood Updated:  01/21/20 2032 Special Requests: NO SPECIAL REQUESTS Culture result: NO GROWTH 4 DAYS     
 CULTURE, BLOOD [[id number]] Collected:  01/17/20 2102 Order Status:  Completed Specimen:  Blood Updated:  01/21/20 3260 Special Requests: --     
  NO SPECIAL REQUESTS 
LEFT 
FOREARM Culture result: NO GROWTH 4 DAYS     
 CULTURE, RESPIRATORY/SPUTUM/BRONCH [place] STAIN [[id number]] Order Status:  Sent Specimen:  Sputum LEGIONELLA PNEUMOPHILA AG, URINE [[id number]] Collected:  01/18/20 0801 Order Status:  Completed Specimen:  Urine, random Updated:  01/19/20 1704 Legionella Ag, urine NEGATIVE STREP PNEUMO AG, URINE [[id number]] Collected:  01/18/20 0801 Order Status:  Completed Specimen:  Urine, random Updated:  01/19/20 1704 Strep pneumo Ag, urine NEGATIVE      
 INFLUENZA A & B AG (RAPID TEST) [[id number]] Collected:  01/17/20 2049 Order Status:  Completed Specimen:  Nasopharyngeal from Nasal washing Updated:  01/17/20 2130 Influenza A Antigen NEGATIVE Comment: A negative result does not exclude influenza virus infection, seasonal or H1N1 due to suboptimal sensitivity.  If influenza is circulating in your community, a diagnosis of influenza should be considered based on a patients clinical presentation and empiric antiviral treatment should be considered, if indicated. Influenza B Antigen NEGATIVE Images: 
 
CT (Most Recent). XRAY (Most Recent) XR Results (most recent): 
Results from Hospital Encounter encounter on 01/17/20 XR CHEST PORT Narrative AP CHEST, PORTABLE 
 
CPT CODE: 07483 INDICATION: Above. Pneumonia. COMPARISON: 1/17/2020. TECHNIQUE: Portable  70 degree upright AP chest radiograph is reviewed. FINDINGS: 
 
Advanced pulmonary emphysematous disease with fibrotic changes, as better 
demonstrated on prior chest CT of 2/6/2018. Diffuse prominence of the 
interstitial markings without significant interval change compared to the 
preceding radiograph. Findings are similar to minimally slightly more prominent 
compared to earlier chest radiographs, for example reference radiograph of 
12/7/2019, questionable for possible pulmonary vascular congestion with mild 
predominantly interstitial pulmonary edema superimposed upon predominantly 
chronic lung disease, though the findings are favored to be largely chronic. No evidence of focal pulmonary consolidation. No evidence of pneumothorax. The 
costophrenic sulci appear sharp. The cardiac silhouette is within normal limits in size  for technique. EKG leads/wires overlie the patient. Bilateral thoracic spinal fusion hardware 
again noted. Impression IMPRESSION:  
 
No significant interval change compared to the preceding radiograph as 
described. EKG No results found for this or any previous visit. 2D ECHO 12/10/19 ECHO ADULT FOLLOW-UP OR LIMITED 12/12/2019 12/12/2019 Narrative · Left Ventricle: Small left ventricle. Mild concentric hypertrophy. Estimated left ventricular ejection fraction is 66 - 70%. No regional wall  
motion abnormality noted. · Pulmonary Artery: Mild pulmonary hypertension.  Pulmonary arterial  
 systolic pressure is 39 mmHg.  
   
  Signed by: Patricio Clark MD

## 2020-01-21 NOTE — ROUTINE PROCESS
Bedside and Verbal shift change report given to Maria C Figueroa RN (oncoming nurse) by Estella Thibodeaux RN 
 (offgoing nurse). Report included the following information SBAR, Kardex, MAR, Recent Results, Med Rec Status and Cardiac Rhythm NSR. Pain management

## 2020-01-22 NOTE — DISCHARGE INSTRUCTIONS
Discharge Instructions    Patient: Rosalia Nogueira MRN: [id number]  [facility]: [id number]    YOB: 1955  Age: 62 y.o. Sex: female    DOA: 1/17/2020 LOS:  LOS: 5 days   Discharge Date:      ACUTE DIAGNOSES:  1. Acute metabolic encephalopathy, RESOLVED  2. Sepsis, RESOLVED  3. Acute on chronic hypoxic respiratory failure, Resolved, back to 3 liters oxygen  4. Acute COPD exacerbation, resolving  5. Suspected pneumonia, vs viral bronchitis, finishing antibiotics  6. Sarcoidosis, on chronic 20mg daily prednisone   7. GERD, stable  8. Chronic diastolic heart failure, not decompensated. 9.  Hyperglycemia with DM2, stable  10. Opioid dependence on methadone   11. Leukocytosis, resolved   12. Thrombocytosis, stable  13. Chronic back pain with recent S/P T10, T9, T8 laminectomy; T7, T8, T9, T10, T11, T12 posterior thoracic fusion; segmental spinal instrumentation; K2M Davis type, T7-T8, T11-T12 (12/11/2019 - Dr. Anna Jaquez)  15. Depression, h/o bipolar that she has discontinued Depakote         DISCHARGE MEDICATIONS:         · It is important that you take the medication exactly as they are prescribed. · Keep your medication in the bottles provided by the pharmacist and keep a list of the medication names, dosages, and times to be taken in your wallet. · Do not take other medications without consulting your doctor. DIET:  Cardiac Diet, Diabetic Diet and Low fat, Low cholesterol    ACTIVITY: Activity as tolerated,   Continue to use oxygen at 3 liters. ADDITIONAL INFORMATION: If you experience any of the following symptoms then please call your primary care physician or return to the emergency room if you cannot get hold of your doctor: Fever, chills, nausea, vomiting, diarrhea, change in mentation, falling, bleeding, shortness of breath. FOLLOW UP CARE:  Dr. Julio C Taveras NP  you are to call and set up an appointment to see them in 7-10 days.     Follow-up with Dr. Han Gordon, your pulmonary MD within 2-4 weeks  Follow up with Dr. Geo Merida, your nephrologist for further care. Information obtained by :  I understand that if any problems occur once I am at home I am to contact my physician. I understand and acknowledge receipt of the instructions indicated above.                                                                                                                                            Physician's or R.N.'s Signature                                                                  Date/Time                                                                                                                                              Patient or Representative Signature                                                          Date/Time    Aaron Gimenez MD  1/22/2020  1:23 PM

## 2020-01-22 NOTE — PROGRESS NOTES
Patient report given to transport. Patient being transported home in stable condition. Has AVS and prescriptions.

## 2020-01-22 NOTE — HOME CARE
Pt is active to [facility] - d/c noted for today [facility] will resume care for SN/PT/OT - D Wei ALVARADO

## 2020-01-22 NOTE — PROGRESS NOTES
Discharge order noted for today. Pt has been accepted to [facility] agency. Met with patient  and are agreeable to the transition plan today. Transport has been arranged through [facility]. Patient's discharge summary and home health  orders have been forwarded to [facility] home health  agency via [facility] Updated bedside RN,  to the transition plan. Discharge information has been documented on the AVS. Nicole Black RN BSN Care Manager [phone]

## 2020-01-22 NOTE — DISCHARGE SUMMARY
Discharge Summary Patient: Abhijeet Sinha               Sex: female          DOA: 1/17/2020 YOB: 1955      Age:  62 y.o.        LOS:  LOS: 5 days Admit Date: 1/17/2020 Discharge Date: 1/22/2020 Primary care physician: Sabrina Edwards NP Discharge Diagnoses:   
1. Acute metabolic encephalopathy, RESOLVED 2. Sepsis, RESOLVED 3. Acute on chronic hypoxic respiratory failure, Resolved, back to 3 liters oxygen 4. Acute COPD exacerbation, resolving 5. Suspected pneumonia, vs viral bronchitis, finishing antibiotics 6. Sarcoidosis, on chronic 20mg daily prednisone 7. GERD, stable 8. Chronic diastolic heart failure, not decompensated. 9.  Hyperglycemia with DM2, stable 10. Opioid dependence on methadone 11. Leukocytosis, resolved 12. Thrombocytosis, stable 13. Chronic back pain with recent S/P T10, T9, T8 laminectomy; T7, T8, T9, T10, T11, T12 posterior thoracic fusion; segmental spinal instrumentation; K2M Davis type, T7-T8, T11-T12 (12/11/2019 - Dr. Nguyen Farley) 14. Depression, h/o bipolar that she has discontinued Depakote 15. Solid left renal lesion seen on CT chest, f/u outpatient workup with PCP Discharge Condition: Good Disposition: home with home health Code Status:full code Follow up for Primary Care Physician: 
1) she is on prednisone taper back to her baseline prednisone, please monitor clinical status 2) she needs follow up with her Pulmonologist in 2-4 weeks for further care. 3) She needs to follow up with Dr. Blayne Villalba, who recent treated her surgically 4) She needs Further WORKUP for her kidney finding on CT chest. Non-emergent imaging 5) she needs follow up with her methadone clinic Hospital Course:  
62 y.o female with Morbid obesity, COPD, Sarcoidosis on chronic prednisone, HTN, chronic diastolic CHF, hypothyroidism, GERD, DM2, opoid dependence on methadone, sickle cell trait, recently hospitalization for T8-T10 laminectomy, T7-12 thoracic fusion by Dr. Felicity Jordan, presented from home after 2 days discharge from Rehab with complaint of cough, worsened shortness of breath and confusion. In the ER, she was found to have sepsis with suspected source of pneumonia/bronchitis vs her recent surgical site, hence she was started on vanco/cefepime. Ortho was consulted but did not have further intervention. Dr. Felicity Jordan ok CT chest follow up as it will give new baseline information. As for her respiratory pathology, she continue to wheeze, hence she was treated with COPD exacerbation. Her oxygen needs improved back to her baseline. Her mentation back to baseline. Her leukocytosis resolved. No growth on culture. Her CT chest did not show clear evidence of pneumonia, however, she was discharged with oral antibiotic to complete her course of treatment. She also has prednisone tapering back to her baseline daily dosing. Last 24 Hours: no overnight event, her oxygen needs remains 3 liters at her baseline for the last 2 days. Wheezing resolved. Cough improved. No shortness of breath while in bed. No fever. CT chest noted below. Concern for new renal lesion that needs outpatient workup. She has clear mentation and agreed for discharge home. She asked [facility] to coordination transportation. ROS  no fever/chills, no headache, no dizziness, no facial pain, no sinus congestion, +cough No swallowing pain, No chest pain, no palpitation, no shortness of breath, no abd pain, No diarrhea, no urinary complaint, no leg pain or swelling VS:  
Visit Vitals /85 (BP 1 Location: Right arm, BP Patient Position: At rest) Pulse 86 Temp 98.6 °F (37 °C) Resp 20 Wt 77.9 kg (171 lb 11.2 oz) LMP 2012 (Exact Date) SpO2 100% Breastfeeding No  
BMI 28.57 kg/m² Tmax/24hrs: Temp (24hrs), Av °F (36.7 °C), Min:97.4 °F (36.3 °C), Max:98.6 °F (37 °C) Intake/Output Summary (Last 24 hours) at 2020 1328 Last data filed at 1/22/2020 1054 Gross per 24 hour Intake 1520 ml Output 2000 ml Net -480 ml General:  Cooperative, Not in acute distress, speaks in short sentence while in bed HEENT: PERRL, EOMI, supple neck, no JVD, dry oral mucosa Cardiovascular: S1S2 regular, no rub/gallop Pulmonary: Clear air entry bilaterally, +resolved wheezing, +fine crackle GI:  Soft, non tender, non distended, +bs, no guarding Extremities:  No pedal edema, +distal pulses appreciated Neuro: AOx3, moving all extremities Consults: * Ortho: Dr Melita Nyhan Significant Diagnostic Studies: XR Results (most recent): 
Results from Hospital Encounter encounter on 01/17/20 XR CHEST PORT Narrative AP CHEST, PORTABLE 
 
CPT CODE: 55534 INDICATION: Above. Pneumonia. COMPARISON: 1/17/2020. TECHNIQUE: Portable  70 degree upright AP chest radiograph is reviewed. FINDINGS: 
 
Advanced pulmonary emphysematous disease with fibrotic changes, as better 
demonstrated on prior chest CT of 2/6/2018. Diffuse prominence of the 
interstitial markings without significant interval change compared to the 
preceding radiograph. Findings are similar to minimally slightly more prominent 
compared to earlier chest radiographs, for example reference radiograph of 
12/7/2019, questionable for possible pulmonary vascular congestion with mild 
predominantly interstitial pulmonary edema superimposed upon predominantly 
chronic lung disease, though the findings are favored to be largely chronic. No evidence of focal pulmonary consolidation. No evidence of pneumothorax. The 
costophrenic sulci appear sharp. The cardiac silhouette is within normal limits in size  for technique. EKG leads/wires overlie the patient. Bilateral thoracic spinal fusion hardware 
again noted. Impression IMPRESSION:  
 
No significant interval change compared to the preceding radiograph as 
described.    
 
 
CT Results (most recent): 
 Results from [facility] Encounter encounter on 01/17/20 CT CHEST W CONT Narrative EXAM:  CT Chest with Contrast. 
          
CLINICAL INDICATION:  Worsening hypoxia. COMPARISON:  02/06/18. TECHNIQUE:   
 
Helically scanned volumetric axial sections of the chest are obtained following IV contrast administration (100 mL [id number]-300). Coronal and sagittal 
multiplanar reconstruction images are generated for improved anatomic 
delineation. Radiation dose optimization techniques are utilized as appropriate to the exam, 
with combination of automated exposure control, adjustment of the mA and/or kV 
according to patient's size (Including appropriate matching for site-specific 
examinations), or use of iterative reconstruction technique. FINDINGS:  
 
Lungs: 
 
- Stable faint right lower lobe lateral aspect 0.3 cm nodule (axial #32). - Stable left upper lobe parenchymal scarring foci. Possible nodule in the left 
upper lobe posterior aspect measuring about 0.7 x 0.5 cm, unchanged compared to 
02/06/18. - Emphysematous changes bilaterally. - No definite developing airspace opacities. Pleura:  No significant pleural effusion is detected bilaterally. Mediastinum-[place]:  No adenopathy. Base of Neck, Axillae:  No adenopathy. Esophagus:  Small hiatal hernia. Abdomen: - There are at least 4 separate foci of splenic hypodense lesions. Of these, 
the superior medial aspect hypodense focus is the most prominent and measures 
about 2.9 x 2.5 cm (axial #45). This focus appears relatively stable compared 
to 02/06/18 with previous measurements of 2.6 x 2.4 cm. Inferiorly located 
hypodensity currently measures about 2.3 x 2.2 cm (axial #56). This previously 
measured about 1.5 x 1.4 cm. A subtle subdiaphragmatic region hypodensity is 
noted measuring 1 x 1.6 x 1.3 cm (axial #42). This was not readily apparent on 
the previous study.   A tiny hypodensity is suggested along the posterior aspect 
of the spleen measuring about 0.9 x 0.6 cm (axial #49). This was not apparent 
on the previous study. - The left kidney reveals an ovoid potentially solid lesion along the medial 
aspect of the kidney measuring about 1.6 x 1.4 cm (axial #60). This region of 
the kidney was not included on the previous scan. Bones: 
 
- Status post posterior thoracic interbody fusion spanning W3-D4-T44-T12, 
apparently because of the severe compression deformity at T9 and inferior 
endplate compression deformity at T10. In addition to the T9 and T10 
abnormalities, fairly pronounced anterior wedging deformity is demonstrated at T5. Notably, the T5 compression deformity was present on the 02/06/18. However 
the T9 and T10 abnormalities were not apparent on the 02/06/18 study. 
- Related to the surgical intervention, there is a posterior midline fluid 
collection measuring about 12.5 cm in clinical length acute and up to about 4.2 
x 2.7 cm in maximal cross-sectional diameter. Impression IMPRESSION:  
          
1.  Overall stable appearance of the lungs with chronic emphysematous changes 
and with stable subtle nodules. No new infiltrate or consolidation. 2.  Solid left renal lesion suspected. Potentially concerning for a developing 
renal mass. Recommend correlation with nonemergent MR or multiphasic CT. 3.  Multiple splenic hypodensities as described. Relatively stable appearance 
for the superior medial aspect hyperdensity. Distinct enlargement for the 
inferior aspect hypodensity. Possibly representing splenic cysts. 4.  Compression deformities as described. 5. Surgical site midline hyperdense fluid collection. Hemorrhagic fluid/seroma. If the patient is febrile or demonstrates leukocytosis, infected fluid 
collection cannot be entirely excluded. Lab/Data Review: 
Labs: Results:  
   
Chemistry Recent Labs  
  01/22/20 
0522 01/21/20 
0130 01/20/20 
0540 * 227* 205*  141 138  
K 4.1 4.2 3.9  109 104 CO2 26 27 26 BUN 17 21* 14  
CREA 0.87 0.85 0.84 CA 8.9 9.0 8.8 AGAP 5 5 8 BUCR 20 25* 17  
  
CBC w/Diff Recent Labs  
  01/22/20 
0522 01/21/20 
0130 01/20/20 
0540 WBC 10.8 8.5 8.9  
RBC 3.61* 3.80* 3.66* HGB 10.3* 10.9* 10.3* HCT 31.8* 33.9* 32.0*  
* 102* 83* Coagulation No results for input(s): PTP, INR, APTT, INREXT in the last 72 hours. Iron/Ferritin No results for input(s): IRON in the last 72 hours. No lab exists for component: TIBCCALC BNP No results for input(s): BNPP in the last 72 hours. Cardiac Enzymes No results for input(s): CPK, CKND1, SANTOS in the last 72 hours. No lab exists for component: Horris Clan Liver Enzymes No results for input(s): TP, ALB, TBIL, AP, SGOT, GPT in the last 72 hours. No lab exists for component: DBIL Thyroid Studies No results for input(s): T4, T3U, TSH, TSHEXT in the last 72 hours. No lab exists for component: T3RU All Micro Results Procedure Component Value Units Date/Time CULTURE, RESPIRATORY/SPUTUM/BRONCH [place] [[id number]] Collected:  01/22/20 3696 Order Status:  Completed Specimen:  Sputum Updated:  01/22/20 1224 CULTURE, BLOOD [[id number]] Collected:  01/17/20 1935 Order Status:  Completed Specimen:  Blood Updated:  01/22/20 0744 Special Requests: NO SPECIAL REQUESTS Culture result: NO GROWTH 5 DAYS     
 CULTURE, BLOOD [[id number]] Collected:  01/17/20 2102 Order Status:  Completed Specimen:  Blood Updated:  01/22/20 0744 Special Requests: --     
  NO SPECIAL REQUESTS 
LEFT 
FOREARM Culture result: NO GROWTH 5 DAYS     
 LEGIONELLA PNEUMOPHILA AG, URINE [[id number]] Collected:  01/18/20 0801 Order Status:  Completed Specimen:  Urine, random Updated:  01/19/20 1704 Legionella Ag, urine NEGATIVE STREP PNEUMO AG, URINE [[id number]] Collected:  01/18/20 0801 Order Status:  Completed Specimen:  Urine, random Updated:  01/19/20 1704 Strep pneumo Ag, urine NEGATIVE      
 INFLUENZA A & B AG (RAPID TEST) [[id number]] Collected:  01/17/20 2049 Order Status:  Completed Specimen:  Nasopharyngeal from Nasal washing Updated:  01/17/20 2130 Influenza A Antigen NEGATIVE Comment: A negative result does not exclude influenza virus infection, seasonal or H1N1 due to suboptimal sensitivity. If influenza is circulating in your community, a diagnosis of influenza should be considered based on a patients clinical presentation and empiric antiviral treatment should be considered, if indicated. Influenza B Antigen NEGATIVE Medications at discharge  including reasons for change and indications for new ones:  
Current Discharge Medication List  
  
START taking these medications Details  
cefpodoxime (VANTIN) 200 mg tablet Take 1 Tab by mouth every twelve (12) hours for 3 days. Qty: 6 Tab, Refills: 0  
  
albuterol-ipratropium (DUO-NEB) 2.5 mg-0.5 mg/3 ml nebu 3 mL by Nebulization route every six (6) hours as needed for Wheezing. Qty: 30 Nebule, Refills: 1 CONTINUE these medications which have NOT CHANGED Details  
busPIRone (BUSPAR) 10 mg tablet Take 10 mg by mouth three (3) times daily. Indications: repeated episodes of anxiety  
  
aspirin 81 mg chewable tablet Take 1 Tab by mouth daily (with breakfast). Indications: stroke prevention 
Qty: 30 Tab, Refills: 0  
  
docusate sodium (COLACE) 100 mg capsule Take 1 Cap by mouth daily (after breakfast). Indications: constipation 
Qty: 30 Cap, Refills: 0  
  
predniSONE (DELTASONE) 20 mg tablet Take 20 mg by mouth daily (with breakfast). Indications: sarcoidosis Qty: 30 Tab, Refills: 0 Associated Diagnoses: Sarcoidosis  
  
ascorbic acid, vitamin C, (VITAMIN C) 250 mg tablet Take 1 Tab by mouth daily (with breakfast). Qty: 30 Tab, Refills: 0 Associated Diagnoses: Acute blood loss as cause of postoperative anemia  
  
calcium citrate 200 mg (950 mg) tablet Take 1 Tab by mouth two (2) times a day. Indications: post-menopausal osteoporosis prevention 
Qty: 60 Tab, Refills: 0 Associated Diagnoses: Status post laminectomy with spinal fusion  
  
ferrous sulfate 325 mg (65 mg iron) tablet Take 1 Tab by mouth daily (with breakfast). Qty: 30 Tab, Refills: 0 Associated Diagnoses: Acute blood loss as cause of postoperative anemia  
  
acetaminophen (TYLENOL) 325 mg tablet Take 2 Tabs by mouth every four (4) hours as needed for Pain. Indications: pain 
Qty: 60 Tab, Refills: 0 Associated Diagnoses: Status post laminectomy with spinal fusion  
  
brief disposable (ADULT) misc by Does Not Apply route. Qty: 1 Package, Refills: 0 Associated Diagnoses: Urge urinary incontinence  
  
methadone (DOLOPHINE) 5 mg tablet Take 4 Tabs by mouth daily. Max Daily Amount: 20 mg. 
Qty: 10 Tab, Refills: 0 Associated Diagnoses: History of back injury  
  
polyethylene glycol (MIRALAX) 17 gram/dose powder Take 17 g by mouth daily. 1 tablespoon with 8 oz of water daily 
Qty: 289 g, Refills: 0  
  
rosuvastatin (CRESTOR) 5 mg tablet TAKE ONE TABLET NIGHTLY Qty: 90 Tab, Refills: 3  
  
albuterol (PROAIR HFA) 90 mcg/actuation inhaler INHALE 2 PUFFS EVERY 4 HOURS AS NEEDED FOR WHEEZING Qty: 1 Inhaler, Refills: 5  
  
insulin lispro (HUMALOG) 100 unit/mL injection To be given 15 min before meals: < 150 - None, 151-200 - 2 u, 201-250 - 4 u, 251-300 - 6 u, 301-350 - 8 u, 351-400 - 10 u, >400 - 12 u Qty: 1 Vial, Refills: 0 Associated Diagnoses: Type 2 diabetes mellitus with stage 3 chronic kidney disease, with long-term current use of insulin (HCC)  
  
insulin syringe,safetyneedle 1 mL 30 gauge x 5/16\" syrg As directed Qty: 50 Each, Refills: 0 Nebulizer Accessories Kit Use with nebulizer machine Qty: 1 Kit, Refills: prn  
 Associated Diagnoses: COPD (chronic obstructive pulmonary disease) ([facility])  
  
cholecalciferol (VITAMIN D3) (1000 Units /25 mcg) tablet Take 1,000 Units by mouth two (2) times a day. levothyroxine (SYNTHROID) 100 mcg tablet Take 100 mcg by mouth Daily (before breakfast). insulin glargine (LANTUS U-100 INSULIN) 100 unit/mL injection 40 Units by SubCUTAneous route Daily (before breakfast). famotidine (PEPCID) 20 mg tablet Take 20 mg by mouth nightly. umeclidinium-vilanterol (ANORO ELLIPTA) 62.5-25 mcg/actuation inhaler Take 1 Puff by inhalation daily. Qty: 1 Inhaler, Refills: 0 BD INSULIN SYRINGE ULTRA-FINE 1 mL 31 gauge x 5/16 syrg   
  
oxybutynin (DITROPAN) 5 mg tablet Take 5 mg by mouth two (2) times a day. allopurinol (ZYLOPRIM) 100 mg tablet Take 100 mg by mouth daily. sertraline (ZOLOFT) 50 mg tablet Take 50 mg by mouth daily. Indications: Bipolar Depression Oxygen-Air Delivery Systems by Nasal route continuous. 2 LPM via NC  Indications: Hypoxemia requiring supplementary oxygen STOP taking these medications  
  
 guaiFENesin ER (MUCINEX) 600 mg ER tablet Comments:  
Reason for Stopping:   
   
 methocarbamol (ROBAXIN) 500 mg tablet Comments:  
Reason for Stopping:   
   
  
 
       
 
Pending laboratory work and tests: final blood culture Activity: Activity as tolerated, fall precaution,  
 
Diet: Cardiac Diet, Diabetic Diet and Low fat, Low cholesterol Wound Care: None needed Kyra Desai MD 
1/22/2020 
1:28 PM

## 2020-01-22 NOTE — DIABETES MGMT
Diabetes Patient/Family Education Record Factors That  May Influence Patients Ability  to Learn or  Comply with Recommendations 
 []   Language barrier    []   Cultural needs   []   Motivation  
 []   Cognitive limitation    []   Physical   []   Education  
 []   Physiological factors   []   Hearing/vision/speaking impairment   []   Muslim beliefs []   Financial factors   []  Other:   [x]  No factors identified at this time. Person Instructed: 
 [x]   Patient   []   Family   []  Other Preference for Learning: 
 [x]   Verbal   [x]   Written   []  Demonstration Level of Comprehension & Competence:   
[]  Good                                      [x] Fair                                     []  Poor                             []  Needs Reinforcement  
[x]  Teachback completed Education Component:  
[x]  Medication management, including how to administer insulin (if appropriate) and potential medication interactions Pt reports taking Lantus insulin 40 units daily and humalog insulin per sliding scale [x]  Nutritional management -obtain usual meal pattern Reports liking candies, puddings and cookies, discussed moderation and portion control.   
[]  Exercise [x]  Signs, symptoms, and treatment of hyperglycemia and hypoglycemia [x] Prevention, recognition and treatment of hyperglycemia and hypoglycemia [x]  Importance of blood glucose monitoring and how to obtain a blood glucose meter Has glucometer, no difficulty reported   
[]  Instruction on use of the blood glucose meter [x]  Discuss the importance of HbA1C monitoring 7.1%, pt reports A1c has been as high as 9% previously [x]  Sick day guidelines  
[]  Proper use and disposal of lancets, needles, syringes or insulin pens (if appropriate) [x]  Potential long-term complications (retinopathy, kidney disease, neuropathy, foot care)  
[] Information about whom to contact in case of emergency or for more information   
[x]  Goal:  Patient/family will demonstrate understanding of Diabetes Self Management Skills by: 1/29/20 Plan for post-discharge education or self-management support: 
  [x] Outpatient class schedule provided            [] Patient Declined 
  [] Scheduled for outpatient classes (date) _______ Verify: 
Does patient understand how diabetes medications work? yes Does patient know what their most recent A1c is? yes Does patient monitor glucose at home? yes Does patient have difficulty obtaining diabetes medications or testing supplies? no 
  
 
Ashlee Sanchez RD, CDE

## 2020-01-22 NOTE — PROGRESS NOTES
Patient discharged home in stable condition. AVS and prescriptions reviewed and provided. Signed page of AVS placed in chart. Hospital armband removed. And shredded.

## 2020-01-22 NOTE — ROUTINE PROCESS
Assumed patient care. Bedside shift report received from Warren. Patient in bed, asleep in no distress with HOB elevated to 30 degrees. Call light placed in reach. Bed in low position. 8:24 AM - Bedside shift change report given to [facility] (oncoming nurse) by Xuan Jackson RN (offgoing nurse). Report given with SBAR, Kardex, Intake/Output, MAR and Recent Results.

## 2020-01-22 NOTE — PROGRESS NOTES
Called [place] transportation [phone] and spoke with Flakita Rush. Arranged stretcher transportation with [facility] from [facility] to [address]. Confirmation number [id number]. Requested [facility] transport. Medicaid has a 3 hours window to arrange trip and will call nurses station with details. Via [employer] to check on transport. Patient has been scheduled for 1730. Nurse Jeannie Whitley Notified. Bandar Diaz RN BSN Care Manager [phone]

## 2020-01-22 NOTE — PROGRESS NOTES
Problem: Diabetes Self-Management Goal: *Disease process and treatment process Description Define diabetes and identify own type of diabetes; list 3 options for treating diabetes. Outcome: Progressing Towards Goal 
  
Problem: Sepsis: Day 5 Goal: Off Pathway (Use only if patient is Off Pathway) Outcome: Progressing Towards Goal 
  
Problem: Pain Goal: *Control of Pain Outcome: Progressing Towards Goal 
  
Problem: Pneumonia: Day 3 Goal: Discharge Planning Outcome: Resolved/Met

## 2020-01-23 NOTE — TELEPHONE ENCOUNTER
Patient had to r/s tomorrow's post op appt for 1/31 as she is just being released from hospital and needs at least 5 days for transportation. She's also asking for an order for a hospital bed in her home. Please prepare order for patient.

## 2020-01-27 NOTE — TELEPHONE ENCOUNTER
Verbal order per NP Onofre message below: DME generic supply for Hospital Bed. Informed patient order would be ready for her at appointment on 1/31/20. Patient stated understanding, no further actions needed at this time.

## 2020-01-28 NOTE — TELEPHONE ENCOUNTER
Alexandria Rodrigues with [facility] called stating she is with the patient right now trying to her in home physical therapy but was just informed by the patient that she fell out of her bed last night. The patient just had surgery on 1/17/2020 and Mirta Gunn was wanting to speak to a nurse or [facility] regarding the patient.  Please advise Mitra Gunn at [phone]

## 2020-01-28 NOTE — TELEPHONE ENCOUNTER
I called and spoke with Nikole Solano. Pt rolled out of bed onto there knees and just has a scratch on her knee. She appears unhurt. She has an appt with Dr. Mejia Holding on 1/31/2020.   Her surgery was actually in Dec.

## 2020-01-29 NOTE — PROGRESS NOTES
[address] 
[address] [place] [address], [address] INPATIENT REHABILITATION 
DAILY PROGRESS NOTE Date: 12/24/2019 Name: Rufino Abdi Age / Sex: 62 y.o. / female CSN: [id number] MRN: [id number] Admit Date: 12/16/2019 Length of Stay: 8 days Primary Rehab Diagnosis: Impaired Mobility and ADLs secondary to: 
1. S/P T10, T9, T8 laminectomy; T7, T8, T9, T10, T11, T12 posterior thoracic fusion; segmental spinal instrumentation; K2M [place] type, T7-T8, T11-T12 (12/11/2019 - Dr. Alesha Cardenas) 2. History of acute compression fractures of body of thoracic vertebrae (T9 and T10) due to fall Subjective:  
 
Patient seen and examined. Blood pressure controlled. Blood glucose fairly controlled. Patient's Complaint:  
No significant medical complaints Pain Control: stable, mild-to-moderate joint symptoms intermittently, reasonably well controlled by current meds Objective:  
 
Vital Signs: 
Patient Vitals for the past 24 hrs: 
 BP Temp Pulse Resp SpO2  
12/24/19 0720 148/89 98 °F (36.7 °C) 80 16 98 % 12/23/19 2227 108/66 97 °F (36.1 °C) 72 18 99 % 12/23/19 1524 111/66 99.2 °F (37.3 °C) 68 18 98 % Physical Examination: 
GENERAL SURVEY: Patient is awake, alert, oriented x 3, sitting comfortably on the chair, not in acute respiratory distress. HEENT: pale palpebral conjunctivae, anicteric sclerae, no nasoaural discharge, moist oral mucosa NECK: supple, no jugular venous distention, no palpable lymph nodes CHEST/LUNGS: symmetrical chest expansion, good air entry, clear breath sounds HEART: adynamic precordium, good S1 S2, no S3, regular rhythm, no murmurs ABDOMEN: obese, bowel sounds appreciated, soft, non-tender EXTREMITIES: pale nailbeds, no edema, full and equal pulses, no calf tenderness NEUROLOGICAL EXAM: The patient is awake, alert and oriented x3, able to answer questions fairly appropriately, able to follow 1 and 2 step commands. Able to tell time from the wall clock. Cranial nerves II-XII are grossly intact. No gross sensory deficit. Motor strength is 4-/5 on BUE, 2+/5 on the RLE (except for 1/5 on the right ankle), 2-/5 on the LLE (except for 2+/5 on the left knee).   
Incision(s): healing well, clean, dry, and intact and serosanguineous Current Medications: 
Current Facility-Administered Medications Medication Dose Route Frequency  lubiPROStone (AMITIZA) capsule 24 mcg  24 mcg Oral DAILY WITH BREAKFAST  insulin lispro (HUMALOG) injection 3 Units  3 Units SubCUTAneous TIDAC  insulin glargine (LANTUS) injection 22 Units  22 Units SubCUTAneous ACB  guaiFENesin ER (MUCINEX) tablet 600 mg  600 mg Oral Q12H  
 acetaminophen (TYLENOL) tablet 650 mg  650 mg Oral Q4H PRN  
 bisacodyl (DULCOLAX) tablet 10 mg  10 mg Oral Q48H PRN  
 ferrous sulfate tablet 325 mg  1 Tab Oral DAILY WITH BREAKFAST  ascorbic acid (vitamin C) (VITAMIN C) tablet 250 mg  250 mg Oral DAILY WITH BREAKFAST  insulin lispro (HUMALOG) injection   SubCUTAneous TIDAC  senna-docusate (PERICOLACE) 8.6-50 mg per tablet 2 Tab  2 Tab Oral PCD  methadone (DOLOPHINE) tablet 20 mg  20 mg Oral DAILY  oxyCODONE-acetaminophen (PERCOCET 10)  mg per tablet 1 Tab  1 Tab Oral Q4H PRN  predniSONE (DELTASONE) tablet 30 mg  30 mg Oral DAILY WITH BREAKFAST  famotidine (PEPCID) tablet 20 mg  20 mg Oral BID  rosuvastatin (CRESTOR) tablet 5 mg  5 mg Oral QHS  albuterol (PROVENTIL VENTOLIN) nebulizer solution 2.5 mg  2.5 mg Nebulization Q4H PRN  
 levothyroxine (SYNTHROID) tablet 75 mcg  75 mcg Oral 6am  
 cholecalciferol (VITAMIN D3) (400 Units /10 mcg) tablet 5 Tab  2,000 Units Oral DAILY  calcium citrate tablet 200 mg [elemental]  200 mg Oral BID  divalproex DR (DEPAKOTE) tablet 500 mg  500 mg Oral QHS  fluticasone-vilanterol (BREO ELLIPTA) 100mcg-25mcg/puff  1 Puff Inhalation DAILY  oxybutynin (DITROPAN) tablet 5 mg  5 mg Oral BID  allopurinol (ZYLOPRIM) tablet 100 mg  100 mg Oral DAILY  sertraline (ZOLOFT) tablet 50 mg  50 mg Oral DAILY  methocarbamol (ROBAXIN) tablet 500 mg  500 mg Oral Q8H  
 aspirin chewable tablet 81 mg  81 mg Oral DAILY WITH BREAKFAST  docusate sodium (COLACE) capsule 100 mg  100 mg Oral DAILY AFTER BREAKFAST Allergies: Allergies Allergen Reactions  Moxifloxacin Rash  Pcn [Penicillins] Swelling  Sulfa (Sulfonamide Antibiotics) Swelling Functional Progress: OCCUPATIONAL THERAPY 
 
ON ADMISSION MOST RECENT Eating Functional Level: 5 Eating Functional Level: 5 Grooming Functional Level: 4 Grooming Functional Level: 4 Bathing Functional Level: 2 Bathing Functional Level: 2 Upper Body Dressing Functional Level: 3 Upper Body Dressing Functional Level: 3 Lower Body Dressing Functional Level: 1 Lower Body Dressing Functional Level: 1 Toileting Functional Level: 1 Toileting Functional Level: 1 Toilet Transfers Toilet Transfer Score: 0 Toilet Transfers Toilet Transfer Score: 1(bedside commode) Tub Fletcher Donnie Transfers Tub/Shower Transfer Score: 0 Tub/Shower Transfers Tub/Shower Transfer Score: 0 Legend: 
 7 - Independent 6 - Modified Independent 5 - Standby Assistance / Supervision / Shaniqua Vazquez 4 - Minimum Assistance / [address] 3 - Moderate Assistance 2 - Maximum Assistance 1 - Total Assistance / Dependent Lab/Data Review: 
Recent Results (from the past 24 hour(s)) GLUCOSE, POC Collection Time: 12/23/19  4:42 PM  
Result Value Ref Range Glucose (POC) 300 (H) 70 - 110 mg/dL GLUCOSE, POC Collection Time: 12/23/19  7:37 PM  
Result Value Ref Range Glucose (POC) 286 (H) 70 - 110 mg/dL GLUCOSE, POC Collection Time: 12/24/19  8:14 AM  
Result Value Ref Range Glucose (POC) 165 (H) 70 - 110 mg/dL GLUCOSE, POC  
 Collection Time: 12/24/19 10:47 AM  
Result Value Ref Range Glucose (POC) 215 (H) 70 - 110 mg/dL Estimated Glomerular Filtration Rate: On admission, estimated GFR based on a Creatinine of 0.68 mg/dl: 
            Using CKD-EPI = 107.9 mL/min/1.73m2 Using MDRD = 112.4 mL/min/1.73m2 Most recent estimated GFR, based on a Creatinine of 0.79 mg/dl on 12/23/2019: 
 Using CKD-EPI = 92.3 mL/min/1.73m2 Using MDRD = 94.5 mL/min/1.73m2 Assessment:  
 
Primary Rehabilitation Diagnosis 1. Impaired Mobility and ADLs 2. S/P T10, T9, T8 laminectomy; T7, T8, T9, T10, T11, T12 posterior thoracic fusion; segmental spinal instrumentation; K2M [place] type, T7-T8, T11-T12 (12/11/2019 - Dr. Jazmin Arias) 3. History of acute compression fractures of body of thoracic vertebrae (T9 and T10) due to fall 
  
Comorbidities  Diabetic neuropathy associated with type 2 diabetes mellitus (HCC) E11.40  Venous insufficiency I87.2  Obesity, Class I, BMI 30-34.9 E66.9  Chronic back pain M54.9, G89.29  
 Generalized osteoarthritis of multiple sites M15.9  Bipolar affective disorder  F31.9  Abnormally low high density lipoprotein (HDL) cholesterol with hypertriglyceridemia E78.6, E78.1  Diastolic dysfunction without heart failure I51.89  Chronic obstructive pulmonary disease (COPD)  J44.9  Hypertensive heart disease without heart failure I11.9  Depression F32.9  History of back injury Z87.828  Type 2 diabetes mellitus with diabetic neuropathy, with long-term current use of insulin  E11.40, Z79.4  Anxiety F41.9  Wears glasses Z97.3  History of hepatitis C Z86.19  
 Sickle cell trait D57.3  History of acute renal failure Z87.448  Hypothyroidism E03.9  Recurrent genital herpes A60.00  Sarcoidosis D86.9  Abscess of right arm L02.413  Hypoxemia requiring supplemental oxygen R09.02, Z99.81  
 Abnormal nuclear stress test R94.39  
  Cellulitis and abscess of hand L03.119, L02.519  
 Cellulitis and abscess of foot L03.119, L02.619  
 Cellulitis of arm, right L03. 113  
 Olecranon bursitis of right elbow M70.21  
 Contusion of left elbow S50. 02XA  Intravenous drug user F19.90  Right Achilles tendinitis M76.61  
 History of penicillin allergy Z88.0  Falls frequently R29.6  Gastroesophageal reflux disease with hiatal hernia K21.9, K44.9  Memory difficulty R41.3  Mixed connective tissue disease  M35.1  Hyperuricemia E79.0  History of vitamin D deficiency Z86.39  
 Urge urinary incontinence N39.41  
 Acute blood loss as cause of postoperative anemia D62  
 History of fracture due to fall Z87.81  Chronic respiratory failure with hypoxia  J96.11  
 Cellulitis of right forearm L03. 113  
 Gout M10.9  Menopause Z78.0  Long term current use of aspirin Z79.82  
 Opioid dependence  F11.20  Tobacco use disorder F17.200 Plan: 1. Justification for continued stay: Good progression towards established rehabilitation goals. 2. Medical Issues being followed closely: 
  [x]  Fall and safety precautions  
  []  Wound Care  
  [x]  Bowel and Bladder Function  
  [x]  Fluid Electrolyte and Nutrition Balance  
  []  Pain Control 3. Issues that 24 hour rehabilitation nursing is following: 
  [x]  Fall and safety precautions  
  []  Wound Care  
  [x]  Bowel and Bladder Function  
  [x]  Fluid Electrolyte and Nutrition Balance  
  []  Pain Control   
  [x]  Assistance with and education on in-room safety with transfers to and from the bed, wheelchair, toilet and shower. 4. Acute rehabilitation plan of care: 
  [x]  Continue current care and rehab. [x]  Physical Therapy [x]  Occupational Therapy  
        []  Speech Therapy  
  []  Hold Rehab until further notice 5. Medications: 
  [x]  MAR Reviewed [x]  Continue Present Medications 6. DVT Prophylaxis:   
  []  Lovenox []  Unfractionated Heparin  
  []  Coumadin  
  []  NOAC [x]  ROBERT Stockings  
  []  Sequential Compression Device []  None 7. Orders:  
> S/P T10, T9, T8 laminectomy; T7, T8, T9, T10, T11, T12 posterior thoracic fusion; segmental spinal instrumentation; K2M [place] type, T7-T8, T11-T12 (12/11/2019 - Dr. Julia Munoz); History of acute compression fractures of body of thoracic vertebrae (T9 and T10) due to fall 
 > Thoracic spinal precautions 
 > Continue: 
  > Calcium citrate 200 mg PO BID 
  > Cholecalciferol 2,000 units PO once daily 
 
> Acute Postoperative Blood Loss Anemia 
 > Anemia work-up (12/13/2019) showed serum iron 11, TIBC 215, iron % saturation 5, ferritin 146 
 > Hgb/Hct (12/17/2019, on admission) = 7.4/23.1 
 > Reticulocyte count (12/17/2019) = 2.5 
 > Hgb/Hct (12/23/2019) = 8.5/26.7  
 > Continue: 
  > FeSO4 325 mg PO once daily with breakfast  
  > Ascorbic Acid 250 mg PO once daily with breakfast (to enhance the absorption of the FeSO4)  
 
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 
 > Prior to admission, the patient stated she was on Metolazone 2.5 mg PO q Mon-Wed-Fri 
 > Metolazone was not given during the patient's hospital stay at [facility]  
 > Upon admission to the ARU, held Metolazone  
 
> Chronic obstructive pulmonary disease; Chronic respiratory failure with hypoxemia requiring supplemental oxygen (3 LPM) > Continue: 
  > Fluticasone-Vilanterol 100-25 1 puff inhaled once daily 
  > O2 inhalation at 3 LPM via nasal cannula continuous 
 
> Opioid dependence 
 > Continue Methadone 20 mg PO once daily 
 
> Type 2 diabetes mellitus with stage 3 chronic kidney disease 
 > HbA1c (12/11/2019) = 7.1 
 > On 12/22/2019: 
  > Increased Insulin glargine from 20 units to 22 units PO once daily before breakfast 
  > Started Insulin lispro 3 units SC TID AC  
 > Continue: > Increase Insulin glargine from 22 units to 25 units PO once daily before breakfast 
  > Increase Insulin lispro from 3 units to 4 units SC TID AC  
  > Insulin lispro sliding scale SC TID AC only 
 
> Cough 
 > On 12/18/2019, started Guaifenesin  mg PO q 12 hr 
 > Continue Guaifenesin  mg PO q 12 hr 
 
> Constipation 
 > On 12/17/2019, started: 
  > Docusate sodium 100 mg PO once daily after breakfast 
  > PeriColace 2 tabs PO once daily after dinner 
 > On 12/22/2019, started Lubiprostone 24 mcg PO once daily with breakfast 
 > Continue: 
  > Docusate sodium 100 mg PO once daily after breakfast 
  > PeriColace 2 tabs PO once daily after dinner 
  > Lubiprostone 24 mcg PO once daily with breakfast 
  
> Analgesia 
 > Continue: 
  > Acetaminophen 650 mg PO q 4 hr PRN for pain level less than 5/10 
  > Methocarbamol 500 mg PO q 8 hr 
  > Percocet 10/325 1 tab PO q 4 hr PRN for pain level greater than 4/10  
 
> Diet: 
 > Specifications: Cardiac, diabetic consistent carb 1800 kcal, no concentrated sweets, low purine 
 > Solids (consistency): Regular  
 > Liquids (consistency): Thin 8. Patient's progress in rehabilitation and medical issues discussed with the patient. All questions answered to the best of my ability. Care plan discussed with patient and nurse. Signed:    Robert Love MD 
  December 24, 2019 room air

## 2020-02-28 NOTE — PROGRESS NOTES
HISTORY OF PRESENT ILLNESS Rosemary Lozano is a 62 y.o. female following up for COPD exacerbation. History of Sarcoidosis and COPD, last FEV 1 in 2012 was 51%, pt has been unable to perform FVC maneuvers since. Pt has been maintained on Anoro and has been off Prednisone but now complains of increased SOB and a cough productive of yellowish phlegm for at least 2 weeks. She denies fever but has noted increased wheezing. Pt with persistent cough with yellowish to greenish phlegm. Pt with history of CKD and is followed by Dr. Marilynn Miller. Interval history includes hospital admission for COPD exacerbation complicated by compression fracture of the T spine. CT chest done then also showed stable lung nodules but a possible new renal mass. Pt denies hematuria or pain, no recent UTI. Pt continues to smoke 1 pack of cigarettes daily despite O2 use but no further use of recreational drugs. COPD The history is provided by the patient. This is a chronic problem. The problem occurs daily. Progression since onset: waxing and waning. Associated symptoms include shortness of breath. Pertinent negatives include no chest pain, no abdominal pain and no headaches. The symptoms are aggravated by exertion. The symptoms are relieved by medications. Treatments tried: see list. Improvement on treatment: see above. Review of Systems Constitutional: Positive for malaise/fatigue. Negative for chills, diaphoresis, fever and weight loss. HENT: Positive for congestion. Negative for ear discharge, ear pain, hearing loss, nosebleeds, sinus pain, sore throat and tinnitus. Eyes: Negative for blurred vision, double vision, photophobia, pain, discharge and redness. Respiratory: Positive for sputum production, shortness of breath and wheezing. Negative for cough, hemoptysis and stridor. Cardiovascular: Positive for leg swelling. Negative for chest pain, palpitations, orthopnea, claudication and PND. Gastrointestinal: Negative for abdominal pain, blood in stool, constipation, diarrhea, heartburn, melena, nausea and vomiting. Genitourinary: Negative for dysuria, flank pain, frequency, hematuria and urgency. Musculoskeletal: Positive for back pain. Negative for falls, joint pain, myalgias and neck pain. Skin: Negative for itching and rash. Neurological: Negative for dizziness, tingling, tremors, sensory change, speech change, focal weakness, seizures, loss of consciousness, weakness and headaches. Endo/Heme/Allergies: Negative for environmental allergies and polydipsia. Does not bruise/bleed easily. Psychiatric/Behavioral: Positive for depression. Negative for hallucinations, memory loss, substance abuse and suicidal ideas. The patient is not nervous/anxious and does not have insomnia. Past Medical History:  
Diagnosis Date  Abnormally low high density lipoprotein (HDL) cholesterol with hypertriglyceridemia Lipid profile (11/6/2016) showed , , HDL 38, LDL 37  Acute blood loss as cause of postoperative anemia 12/12/2019  Anxiety  Bipolar affective disorder ([address] 12/5/2012  Cellulitis of right forearm 05/04/2017  Chronic back pain  Chronic obstructive pulmonary disease (COPD) ([address] SOB, on [place] O2 Recent admission with psychosis  Chronic respiratory failure with hypoxia ([address] On home oxygen inhalation at 3 LPM via NC  
 Contusion of left elbow 5/4/2017  Depression  Diabetic neuropathy associated with type 2 diabetes mellitus ([address]  Diastolic dysfunction without heart failure Stable on diuretics  Falls frequently  Gastroesophageal reflux disease with hiatal hernia  Generalized osteoarthritis of multiple sites  Gout On Allopurinol  History of acute renal failure 5/31/2013  History of back injury   
 jumped out of second story window  History of fracture due to fall 12/11/2019  
 History of hepatitis C   
 treated  History of penicillin allergy  History of vitamin D deficiency 5/10/2017  Hypertensive heart disease without heart failure Better controlled  Hyperuricemia 5/26/2017  Hypothyroidism  Hypoxemia requiring supplemental oxygen 12/29/2014  Intravenous drug user 5/2/2017  Long term current use of aspirin  Memory difficulty  Menopause  Mixed connective tissue disease ([facility] 5/10/2017  Obesity, Class I, BMI 30-34.9  Olecranon bursitis of right elbow 5/4/2017  Opioid dependence ([facility] On Methadone  Recurrent genital herpes 5/31/2013  Right Achilles tendinitis 5/2/2017  Sarcoidosis  Sickle cell trait ([facility]  Tobacco use disorder  Type 2 diabetes mellitus with diabetic neuropathy, with long-term current use of insulin ([facility] HbA1c (12/11/2019) = 7.1  Urge urinary incontinence 5/10/2017  Venous insufficiency  Wears glasses Current Outpatient Medications on File Prior to Visit Medication Sig Dispense Refill  ARIPiprazole (ABILIFY) 10 mg tablet Take 1 Tab by mouth.  aspirin-calcium carbonate 81 mg-300 mg calcium(777 mg) tab Take 1 Tab by mouth.  divalproex DR (DEPAKOTE) 250 mg tablet Take 1 Tab by mouth.  gabapentin (NEURONTIN) 300 mg capsule Take 1 Cap by mouth.  ipratropium (ATROVENT) 0.02 % soln  tolterodine (DETROL) 1 mg tablet Take 1 Tab by mouth.  busPIRone (BUSPAR) 15 mg tablet Take 15 mg by mouth two (2) times a day. Indications: repeated episodes of anxiety 60 Tab 2  
 OXYGEN-AIR DELIVERY SYSTEMS 3 L/min by Nasal route continuous. [employer] O2    
 albuterol-ipratropium (DUO-NEB) 2.5 mg-0.5 mg/3 ml nebu 3 mL by Nebulization route every six (6) hours as needed for Wheezing. 30 Nebule 1  
 busPIRone (BUSPAR) 10 mg tablet Take 10 mg by mouth three (3) times daily. Indications: repeated episodes of anxiety  cholecalciferol (VITAMIN D3) (1000 Units /25 mcg) tablet Take 1,000 Units by mouth two (2) times a day.  levothyroxine (SYNTHROID) 100 mcg tablet Take 100 mcg by mouth Daily (before breakfast).  insulin glargine (LANTUS U-100 INSULIN) 100 unit/mL injection 20 Units by SubCUTAneous route Daily (before breakfast).  famotidine (PEPCID) 20 mg tablet Take 20 mg by mouth nightly.  aspirin 81 mg chewable tablet Take 1 Tab by mouth daily (with breakfast). Indications: stroke prevention 30 Tab 0  
 docusate sodium (COLACE) 100 mg capsule Take 1 Cap by mouth daily (after breakfast). Indications: constipation 30 Cap 0  
 ascorbic acid, vitamin C, (VITAMIN C) 250 mg tablet Take 1 Tab by mouth daily (with breakfast). 30 Tab 0  
 calcium citrate 200 mg (950 mg) tablet Take 1 Tab by mouth two (2) times a day. Indications: post-menopausal osteoporosis prevention 60 Tab 0  
 ferrous sulfate 325 mg (65 mg iron) tablet Take 1 Tab by mouth daily (with breakfast). 30 Tab 0  
 acetaminophen (TYLENOL) 325 mg tablet Take 2 Tabs by mouth every four (4) hours as needed for Pain. Indications: pain 60 Tab 0  
 brief disposable (ADULT) misc by Does Not Apply route. 1 Package 0  
 methadone (DOLOPHINE) 5 mg tablet Take 4 Tabs by mouth daily. Max Daily Amount: 20 mg. 10 Tab 0  
 polyethylene glycol (MIRALAX) 17 gram/dose powder Take 17 g by mouth daily. 1 tablespoon with 8 oz of water daily 289 g 0  
 umeclidinium-vilanterol (ANORO ELLIPTA) 62.5-25 mcg/actuation inhaler Take 1 Puff by inhalation daily. 1 Inhaler 0  
 BD INSULIN SYRINGE ULTRA-FINE 1 mL 31 gauge x 5/16 syrg  rosuvastatin (CRESTOR) 5 mg tablet TAKE ONE TABLET NIGHTLY (Patient taking differently: Take 5 mg by mouth nightly. TAKE ONE TABLET NIGHTLY) 90 Tab 3  
 albuterol (PROAIR HFA) 90 mcg/actuation inhaler INHALE 2 PUFFS EVERY 4 HOURS AS NEEDED FOR WHEEZING 1 Inhaler 5  
 oxybutynin (DITROPAN) 5 mg tablet Take 5 mg by mouth two (2) times a day.  allopurinol (ZYLOPRIM) 100 mg tablet Take 100 mg by mouth daily.  insulin lispro (HUMALOG) 100 unit/mL injection To be given 15 min before meals: < 150 - None, 151-200 - 2 u, 201-250 - 4 u, 251-300 - 6 u, 301-350 - 8 u, 351-400 - 10 u, >400 - 12 u (Patient taking differently: by SubCUTAneous route once. To be given 15 min before meals: < 150 - None, 151-200 - 2 u, 201-250 - 4 u, 251-300 - 6 u, 301-350 - 8 u, 351-400 - 10 u, >400 - 12 u) 1 Vial 0  
 insulin syringe,safetyneedle 1 mL 30 gauge x 5/16\" syrg As directed 50 Each 0  
 Nebulizer Accessories Kit Use with nebulizer machine 1 Kit prn  sertraline (ZOLOFT) 50 mg tablet Take 50 mg by mouth daily. Indications: Bipolar Depression  doxycycline (MONODOX) 100 mg capsule  methylPREDNISolone (MEDROL DOSEPACK) 4 mg tablet  oxyCODONE-acetaminophen (PERCOCET) 5-325 mg per tablet Take 1 Tab by mouth.  traMADol (ULTRAM) 50 mg tablet Take 1 Tab by mouth.  traZODone (DESYREL) 100 mg tablet Take 1 Tab by mouth.  predniSONE (DELTASONE) 20 mg tablet Take 20 mg by mouth daily (with breakfast). Indications: sarcoidosis (Patient taking differently: Take 30 mg by mouth daily (with breakfast). Indications: sarcoidosis) 30 Tab 0 No current facility-administered medications on file prior to visit. Allergies Allergen Reactions  Moxifloxacin Rash  Pcn [Penicillins] Swelling  Sulfa (Sulfonamide Antibiotics) Swelling Social History Socioeconomic History  Marital status: SINGLE Spouse name: Not on file  Number of children: Not on file  Years of education: Not on file  Highest education level: Not on file Occupational History  Occupation: Not employed Employer: NOT EMPLOYED Social Needs  Financial resource strain: Not on file  Food insecurity:  
  Worry: Not on file Inability: Not on file  Transportation needs:  
  Medical: Not on file Non-medical: Not on file Tobacco Use  
  Smoking status: Current Every Day Smoker Packs/day: 1.00 Years: 30.00 Pack years: 30.00 Types: Cigarettes Start date: 12/2/1969  Smokeless tobacco: Never Used Substance and Sexual Activity  Alcohol use: No  
 Drug use: No  
  Types: Cocaine, Heroin Comment: +Cocaine Screen 2013  Sexual activity: Not on file Comment: 2014 Lifestyle  Physical activity:  
  Days per week: Not on file Minutes per session: Not on file  Stress: Not on file Relationships  Social connections:  
  Talks on phone: Not on file Gets together: Not on file Attends Anabaptism service: Not on file Active member of club or organization: Not on file Attends meetings of clubs or organizations: Not on file Relationship status: Not on file  Intimate partner violence:  
  Fear of current or ex partner: Not on file Emotionally abused: Not on file Physically abused: Not on file Forced sexual activity: Not on file Other Topics Concern  Not on file Social History Narrative Lives with 15year old son. Blood pressure 131/83, pulse 98, temperature 97.3 °F (36.3 °C), resp. rate 20, height 5' 5\" (1.651 m), last menstrual period 11/25/2012, SpO2 98 %. Physical Exam  
Constitutional: She is oriented to person, place, and time. She appears well-developed and well-nourished. No distress. Wheelchair bound HENT:  
Head: Normocephalic. Nose: Nose normal.  
Mouth/Throat: Oropharynx is clear and moist. No oropharyngeal exudate. Eyes: Pupils are equal, round, and reactive to light. Conjunctivae are normal. Right eye exhibits no discharge. Left eye exhibits no discharge. No scleral icterus. Neck: No JVD present. No tracheal deviation present. No thyromegaly present. Cardiovascular: Normal rate, regular rhythm and intact distal pulses. Exam reveals no gallop. No murmur heard. Pulmonary/Chest: Effort normal. No stridor. No respiratory distress.  She has no wheezes. She has no rales. She exhibits no tenderness. Distant breath sounds Abdominal: Soft. She exhibits no mass. There is no abdominal tenderness. There is no rebound. Musculoskeletal:     
   General: Edema (trace) present. No tenderness or deformity. Lymphadenopathy:  
  She has no cervical adenopathy. Neurological: She is alert and oriented to person, place, and time. Coordination normal.  
Skin: Skin is warm and dry. No rash noted. She is not diaphoretic. No erythema. No pallor. Psychiatric: She has a normal mood and affect. Her behavior is normal. Judgment and thought content normal.  
 
 
CT Results (most recent): 
Results from [facility] Encounter encounter on 01/17/20 CT CHEST W CONT Narrative EXAM:  CT Chest with Contrast. 
          
CLINICAL INDICATION:  Worsening hypoxia. COMPARISON:  02/06/18. TECHNIQUE:   
 
Helically scanned volumetric axial sections of the chest are obtained following IV contrast administration (100 mL [id number]-300). Coronal and sagittal 
multiplanar reconstruction images are generated for improved anatomic 
delineation. Radiation dose optimization techniques are utilized as appropriate to the exam, 
with combination of automated exposure control, adjustment of the mA and/or kV 
according to patient's size (Including appropriate matching for site-specific 
examinations), or use of iterative reconstruction technique. FINDINGS:  
 
Lungs: 
 
- Stable faint right lower lobe lateral aspect 0.3 cm nodule (axial #32). - Stable left upper lobe parenchymal scarring foci. Possible nodule in the left 
upper lobe posterior aspect measuring about 0.7 x 0.5 cm, unchanged compared to 
02/06/18. - Emphysematous changes bilaterally. - No definite developing airspace opacities. Pleura:  No significant pleural effusion is detected bilaterally. Mediastinum-[place]:  No adenopathy. Base of Neck, Axillae:  No adenopathy. Esophagus:  Small hiatal hernia. Abdomen: - There are at least 4 separate foci of splenic hypodense lesions. Of these, 
the superior medial aspect hypodense focus is the most prominent and measures 
about 2.9 x 2.5 cm (axial #45). This focus appears relatively stable compared 
to 02/06/18 with previous measurements of 2.6 x 2.4 cm. Inferiorly located 
hypodensity currently measures about 2.3 x 2.2 cm (axial #56). This previously 
measured about 1.5 x 1.4 cm. A subtle subdiaphragmatic region hypodensity is 
noted measuring 1 x 1.6 x 1.3 cm (axial #42). This was not readily apparent on 
the previous study. A tiny hypodensity is suggested along the posterior aspect 
of the spleen measuring about 0.9 x 0.6 cm (axial #49). This was not apparent 
on the previous study. - The left kidney reveals an ovoid potentially solid lesion along the medial 
aspect of the kidney measuring about 1.6 x 1.4 cm (axial #60). This region of 
the kidney was not included on the previous scan. Bones: 
 
- Status post posterior thoracic interbody fusion spanning [id number]-T12, 
apparently because of the severe compression deformity at T9 and inferior 
endplate compression deformity at T10. In addition to the T9 and T10 
abnormalities, fairly pronounced anterior wedging deformity is demonstrated at T5. Notably, the T5 compression deformity was present on the 02/06/18. However 
the T9 and T10 abnormalities were not apparent on the 02/06/18 study. 
- Related to the surgical intervention, there is a posterior midline fluid 
collection measuring about 12.5 cm in clinical length acute and up to about 4.2 
x 2.7 cm in maximal cross-sectional diameter. Impression IMPRESSION:  
          
1.  Overall stable appearance of the lungs with chronic emphysematous changes 
and with stable subtle nodules. No new infiltrate or consolidation. 2.  Solid left renal lesion suspected. Potentially concerning for a developing renal mass. Recommend correlation with nonemergent MR or multiphasic CT. 3.  Multiple splenic hypodensities as described. Relatively stable appearance 
for the superior medial aspect hyperdensity. Distinct enlargement for the 
inferior aspect hypodensity. Possibly representing splenic cysts. 4.  Compression deformities as described. 5. Surgical site midline hyperdense fluid collection. Hemorrhagic fluid/seroma. If the patient is febrile or demonstrates leukocytosis, infected fluid 
collection cannot be entirely excluded. . 
10/30/19 ECHO ADULT COMPLETE 10/30/2019 10/30/2019 Narrative · Left Ventricle: Normal cavity size. Upper normal wall thickness. Hyperdynamic systolic dysfunction. Estimated left ventricular ejection  
fraction is 56 - 60%. Biplane method used to measure ejection fraction. No  
regional wall motion abnormality noted. · Mitral Valve: Mitral valve non-specific thickening. Mild mitral annular  
calcification. Trace mitral valve regurgitation is present. · Pulmonary Artery: There is no evidence of pulmonary hypertension. · There is no evidence of pulmonary hypertension. No significant tricuspid  
regurgitation to assess pulmonary pressure. Signed by: Erin Dong MD  
 
 
ASSESSMENT and PLAN Encounter Diagnoses Name Primary?  COPD exacerbation ([facility] Yes  Sarcoidosis  Lung nodules  Renal mass  Hypoxemia requiring supplemental oxygen  Compression fracture of body of thoracic vertebra (HCC)  Tobacco dependence Recent hospital admission for COPD exacerbation, see above. Continue current medications Pt will continue to benefit from supplemental O2. Start  Daliresp low dose and increase in 1 month if tolerated, Rx sent. zithromax not Rxed for concerns with QT prolongation with Methadone.  
Reviewed CT results with pt and discussed findings with her nephrologist Dr. Fareed Chew. Will refer pt to [facility] for possible renal mass. Strongly counseled on smoking cessation. RTC 4 weeks

## 2020-02-28 NOTE — PROGRESS NOTES
The pt. C/o chest congestion with prod. Of beige sputum w/streaks of pink. Symptom x 2 weeks. The pink streaks began 3 days ago. The pt. Is wheelchair bound secondary to recent compression fracture of thoracic spine and resulting surgery. She is concerned that she is out of Prednisone at present.

## 2020-02-29 PROBLEM — A41.9 SEPTIC SHOCK (HCC): Status: ACTIVE | Noted: 2020-01-01

## 2020-02-29 PROBLEM — R65.21 SEPTIC SHOCK (HCC): Status: ACTIVE | Noted: 2020-01-01

## 2020-02-29 PROBLEM — R19.8 PERFORATED VISCUS: Status: ACTIVE | Noted: 2020-01-01

## 2020-02-29 NOTE — ANESTHESIA POSTPROCEDURE EVALUATION
Procedure(s): LAPAROTOMY EXPLORATORY. general 
 
Anesthesia Post Evaluation Multimodal analgesia: multimodal analgesia used between 6 hours prior to anesthesia start to PACU discharge Patient location during evaluation: ICU Pain score: 0 Anesthetic complications: no 
Cardiovascular status: acceptable Respiratory status: acceptable Hydration status: acceptable Post anesthesia nausea and vomiting:  none Vitals Value Taken Time BP 99/62 2/29/2020  5:45 PM  
Temp 37.1 °C (98.8 °F) 2/29/2020  5:54 PM  
Pulse 113 2/29/2020  5:54 PM  
Resp 23 2/29/2020  5:54 PM  
SpO2 100 % 2/29/2020  5:54 PM  
Vitals shown include unvalidated device data.

## 2020-02-29 NOTE — H&P
[facility] Pulmonary, Critical Care, and Sleep Medicine Name: Dang Lebron MRN: [id number] : 1955 Hospital: [address] [place] [address] Date: 2020 Critical Care History and Physical 
 
 
IMPRESSION:  
· Acute on Chronic Respiratory Failure - Emergently intubated in ED for airway protection, 2/2 below. Now post-op and remains on mechanical ventilation. Chronically on 3L NC at home · Acute Sigmoid Colon Perforation - s/p Emergent Ex-Lap with drainage of intraabdominal stool and collections; sigmoid colectomy with Dr. Geovani Hernandez on 20. Pt left with open closure with MOHINDER drain to suction. Plan to go back to OR in the AM (20) for washout and abdominal closure. · Septic Shock with MSOF - 2/2 above. Possible secondary infection with dirty UA. Currently on Levophed at 25mcg/min s/p OR. Required  Wil-Synephrine intraoperatively as well. · Peritonitis - 2/2 above - Sigmoid Colon Perforation with large intraabdominal stool burden. · Bandemia - Initially with 22% bands. Anticipate L shift and leukocytosis within next 24-48 hrs. · Lactic Acidosis - Initially 6.7 in ED. 2/2 above. · Metabolic Acidosis - 2/2 above · ELIF - Likely pre-renal, 2/2 above. · UTI - 2+ bacteria in UA with WBCs and RBCs. (-) Nitrates. · Lytes - Hypocalcemia, hypokalemia, hypomagnesemia post-op · T2DM - Lantus 20u qhs at home. · Paraparesis- in wheelchair: Recent Hx of Spine Thoracic Laminectomy T6-T11 with Fusion (20) with Dr. Zhanna Painter 2/2 Acute compression fx of T9-T10 and spinal cord compression at T9 and T10 and spinal cord edema with subsequent paraparesis. · Hx of Sarcoidosis - Chronic steroid use · Hx of COPD - Follows with Dr. Mery Asher · Hx of Cocaine and Heroin Abuse - Noted to be Methadone at home. Patient Active Problem List  
Diagnosis Code  Diabetic neuropathy associated with type 2 diabetes mellitus (HCC) E11.40  Venous insufficiency I87.2  Obesity, Class I, BMI 30-34.9 E66.9  Chronic back pain M54.9, G89.29  
 Generalized osteoarthritis of multiple sites M15.9  Bipolar affective disorder ([facility] F31.9  Abnormally low high density lipoprotein (HDL) cholesterol with hypertriglyceridemia E78.6, E78.1  Diastolic dysfunction without heart failure I51.89  Chronic obstructive pulmonary disease (COPD) ([facility] J44.9  Hypertensive heart disease without heart failure I11.9  Depression F32.9  History of back injury Z87.828  Type 2 diabetes mellitus with diabetic neuropathy, with long-term current use of insulin ([facility]) E11.40, Z79.4  Anxiety F41.9  Wears glasses Z97.3  History of hepatitis C Z86.19  
 Sickle cell trait ([facility]) D57.3  History of acute renal failure Z87.448  Hypothyroidism E03.9  Recurrent genital herpes A60.00  Sarcoidosis D86.9  Abscess of right arm L02.413  Hypoxemia requiring supplemental oxygen R09.02, Z99.81  
 Abnormal nuclear stress test R94.39  
 Cellulitis and abscess of hand L03.119, L02.519  
 Cellulitis and abscess of foot L03.119, L02.619  
 Cellulitis of arm, right L03. 113  
 Olecranon bursitis of right elbow M70.21  
 Contusion of left elbow S50. 02XA  Intravenous drug user F19.90  Right Achilles tendinitis M76.61  
 History of penicillin allergy Z88.0  Falls frequently R29.6  Gastroesophageal reflux disease with hiatal hernia K21.9, K44.9  Memory difficulty R41.3  Mixed connective tissue disease ([facility] M35.1  Impaired mobility and ADLs Z74.09  
 Hyperuricemia E79.0  History of vitamin D deficiency Z86.39  
 Urge urinary incontinence N39.41  Spinal cord compression ([facility]) G95.20  Compression fracture of body of thoracic vertebra ([facility]) S22.000A  Status post laminectomy with spinal fusion Z98.1  Acute blood loss as cause of postoperative anemia D62  
 History of fracture due to fall Z87.81  
  Chronic respiratory failure with hypoxia ([facility]) J96.11  
 Cellulitis of right forearm L03. 113  
 Gout M10.9  Menopause Z78.0  Long term current use of aspirin Z79.82  
 Opioid dependence ([facility]) F11.20  Tobacco use disorder F17.200  Sepsis ([facility] A41.9  Perforated viscus R19.8  Septic shock ([facility]) A41.9, R65.21  
 
  
RECOMMENDATIONS:  
· Resp: Titrate FiO2 O2 for SpO2 >94%; optimize bronchial hygiene. Duo-nebs q4 and Albuterol nebs q6 PRN. Pulmicort nebs BID. Daily CXR and ABG while intubated. Avoid high PEEP in pt with open abdomen. · I/D: Sepsis bundle per hospital protocol, f/u BxCx/UC, Trend LA q4hrs until normal. Con't Flagyl, Cefepime and Vanc. Add Eraxis for antifungal prophylaxis as pt is at high risk for fungemia with bowel perforation. Deescalate ABX once Cx's finalize. Consider ID consult pending clinical course. · Hem/Onc: Daily CBC. Closely monitor for s/s of active bleeding. Check CBC and PT/INR post-op. · CVS: HD currently stable while on Levophed 22mcg/min. Continue volume resuscitation with Normosol boluses PRN. Give 1L bolus now and monitor. Anticipate pt will third space overnight. Utilize Albumin PRN secondary option. May need to start shock dose Vasopressin, but will attempt to avoid given risk of bowel ischemia in large abdominal surgery. Would add Wil-Synephrine first if needed. Monitor CVP, Actively titrate vasopressors aim MAP >65mmHg, Trend CE's. · Metabolic: Daily CMP, Mag and Phos; monitor e-lytes; replace PRN. Check post-op CMP, Mag, Phos and ionized Ca++. · Renal: Trend Renal indices; Batool. · Endocrine: POC Glucose q6; SSI. Anticipate pt may require Insulin gtt while on stress dose steroids as she is on Lantus 20u at home. Will start with SSI and adjust from there. · GI: SUP, Trend LFTs, Zofran PRN for N/V. NPO. General Surgery following. Plan to return to OR in AM for abdominal washout and closure. Monitor output from MOHINDER drain. · Musc/Skin: No acute issues. · Neuro: Fentanyl and Versed gtt and PRN for sedation. Anticipate pt will have high opoid tolerance - noted to be on Methadone (unsure of current dose) at home and hx of IVDA. May require Dilaudid PCA. RASS 0 to -1. Bilateral soft wrist restraints for pt safety while intubated. · Fluids: Normosol at 75cc/hr. · Code Status: Full Best Practices/Safety Bundles: 
· Sepsis Bundle per Hospital Protocol · Glycemic control; avoid Hypoglycemia · IHI ICU Bundles: 
·  Central Line Bundle Followed , Rousseau Bundle Followed and Vent Bundle Followed, Vent Day 1   
· Mech Vent patients/ Pulmonary pts:  
· VAP bundle, Aim to keep peak plateau pressure 38-99XG H2O 
· Aspiration Precautions - HOB >30' · Daily sedation holiday as indicated · SBT as tolerated/appropriate · Stress ulcer prophylaxis: Protonix · DVT prophylaxis: SCD's - Avoid chemical prophylaxis as pt will be returning to OR in AM. · Need for Lines, rousseau assessed. · Restraints need. Subjective/History: This patient has been seen and evaluated at the request of Dr. Serenity Shepherd for Septic Shock 2/2 Perforated Sigmoid Colon with large Stool burden spillage and subsequently Peritonitis. 02/29/20 Patient is a 62 y.o. female with PMH of COPD (on 3LPM NC home O2), Sarcoidosis, HTN, T2DM,  Hep C, recent Spine Thoracic Laminectomy T6-T11 with Fusion (12/11/20) 2/2 compression fx of T9-T10 and spinal cord compression at T9 and T10 and spinal cord edema with subsequent paraparesis, who presented to [facility] ED on 02/29/20 c/o acute abdominal pain, SOB with associated constipation x4-5 days PTA, likely 2/2 aforementioned spinal surgery on 12/11/19. Per chart, pt's last BM was 2 weeks ago (~02/15/20). Pt was initially placed on BiPAP in ED for respiratory distress. CT Chest/Abd/Pelvis showed intraperitoneal free air consistent with perforated viscus. ED w/o otherwise significant for lactic acidosis. pt was ultimately intubated in ED for worsening overall status and respiratory failure. Sepsis alert triggered and protocol initiated. Patient received Cefepime in the ED, along with 2 L NS bolus. BxCx sent. CVL placed in ED - R IJ. Pt was started on stress dose steroids. General Surgery was immediately consulted and pt was taken to OR for emergent Ex lap. Patient presented to ICU on mechanical ventilation, s/p ex lap where patient was found to have sigmoid colon perforation, 2/2 constipation with large amount of stool burden spilling into the peritoneum per Dr. Mendy Ruiz. Pt received Albumin 25% bolus and 5% bolus, Wil-Synephrine and Levophed intraoperatively. Patient arrived to ICU on Levophed at 20mcg/min. Majestic placed in OR. Pt currently stable on Levophed and will wake to command. Pt was left with an open abdomen with dressing closure with MOHINDER drain set to suction. Dr. Mendy Ruiz, plan is to take patient back to OR in a.m. for abdominal washout and closure. The patient is critically ill and can not provide additional history due to Ventilated. Past Medical History:  
Diagnosis Date  Abnormally low high density lipoprotein (HDL) cholesterol with hypertriglyceridemia Lipid profile (11/6/2016) showed , , HDL 38, LDL 37  Acute blood loss as cause of postoperative anemia 12/12/2019  Anxiety  Bipolar affective disorder ([address] 12/5/2012  Cellulitis of right forearm 05/04/2017  Chronic back pain  Chronic obstructive pulmonary disease (COPD) ([address] SOB, on [place] O2 Recent admission with psychosis  Chronic respiratory failure with hypoxia ([address] On home oxygen inhalation at 3 LPM via NC  
 Contusion of left elbow 5/4/2017  Depression  Diabetic neuropathy associated with type 2 diabetes mellitus ([address]  Diastolic dysfunction without heart failure Stable on diuretics  Falls frequently  Gastroesophageal reflux disease with hiatal hernia  Generalized osteoarthritis of multiple sites  Gout On Allopurinol  History of acute renal failure 2013  History of back injury   
 jumped out of second story window  History of fracture due to fall 2019  
 History of hepatitis C   
 treated  History of penicillin allergy  History of vitamin D deficiency 5/10/2017  Hypertensive heart disease without heart failure Better controlled  Hyperuricemia 2017  Hypothyroidism  Hypoxemia requiring supplemental oxygen 2014  Intravenous drug user 2017  Long term current use of aspirin  Memory difficulty  Menopause  Mixed connective tissue disease ([address] 5/10/2017  Obesity, Class I, BMI 30-34.9  Olecranon bursitis of right elbow 2017  Opioid dependence ([facility] On Methadone  Recurrent genital herpes 2013  Right Achilles tendinitis 2017  Sarcoidosis  Sickle cell trait ([facility]  Tobacco use disorder  Type 2 diabetes mellitus with diabetic neuropathy, with long-term current use of insulin ([facility] HbA1c (2019) = 7.1  Urge urinary incontinence 5/10/2017  Venous insufficiency  Wears glasses Past Surgical History:  
Procedure Laterality Date [facility]  HX  SECTION    
 HX CYST REMOVAL Left  Left foot  HX OTHER SURGICAL Left 2017 S/P incision and drainage of the abscess of the left hand and the left foot (2017 - Dr. Carter Salinas. Karmen Keita)  HX THORACIC LAMINECTOMY  2019 S/P T10, T9, T8 laminectomy; T7, T8, T9, T10, T11, T12 posterior thoracic fusion; segmental spinal instrumentation; K2M [place] type, T7-T8, T11-T12 (2019 - Dr. Savi Novak)  HX TUBAL LIGATION Prior to Admission medications Medication Sig Start Date End Date Taking? Authorizing Provider  
roflumilast (DALIRESP) 250 mcg tab Take 250 mcg by mouth daily.  20   Heather Gerardo MD  
 ARIPiprazole (ABILIFY) 10 mg tablet Take 1 Tab by mouth. Provider, Historical  
aspirin-calcium carbonate 81 mg-300 mg calcium(777 mg) tab Take 1 Tab by mouth. Provider, Historical  
divalproex DR (DEPAKOTE) 250 mg tablet Take 1 Tab by mouth. Provider, Historical  
doxycycline (MONODOX) 100 mg capsule  11/20/19   Provider, Historical  
gabapentin (NEURONTIN) 300 mg capsule Take 1 Cap by mouth. Provider, Historical  
ipratropium (ATROVENT) 0.02 % soln  1/22/20   Provider, Historical  
methylPREDNISolone (MEDROL DOSEPACK) 4 mg tablet  12/5/19   Provider, Historical  
oxyCODONE-acetaminophen (PERCOCET) 5-325 mg per tablet Take 1 Tab by mouth. Provider, Historical  
tolterodine (DETROL) 1 mg tablet Take 1 Tab by mouth. Provider, Historical  
traMADol (ULTRAM) 50 mg tablet Take 1 Tab by mouth. Provider, Historical  
traZODone (DESYREL) 100 mg tablet Take 1 Tab by mouth. Provider, Historical  
busPIRone (BUSPAR) 15 mg tablet Take 15 mg by mouth two (2) times a day. Indications: repeated episodes of anxiety 2/13/20   Jerrica Castillo NP  
OXYGEN-AIR DELIVERY SYSTEMS 3 L/min by Nasal route continuous. [employer] O2    Provider, Historical  
albuterol-ipratropium (DUO-NEB) 2.5 mg-0.5 mg/3 ml nebu 3 mL by Nebulization route every six (6) hours as needed for Wheezing. 1/22/20   Shimon Jernigan MD  
busPIRone (BUSPAR) 10 mg tablet Take 10 mg by mouth three (3) times daily. Indications: repeated episodes of anxiety    Provider, Historical  
cholecalciferol (VITAMIN D3) (1000 Units /25 mcg) tablet Take 1,000 Units by mouth two (2) times a day. Provider, Historical  
levothyroxine (SYNTHROID) 100 mcg tablet Take 100 mcg by mouth Daily (before breakfast). Provider, Historical  
insulin glargine (LANTUS U-100 INSULIN) 100 unit/mL injection 20 Units by SubCUTAneous route Daily (before breakfast). Provider, Historical  
famotidine (PEPCID) 20 mg tablet Take 20 mg by mouth nightly.     Provider, Historical  
aspirin 81 mg chewable tablet Take 1 Tab by mouth daily (with breakfast). Indications: stroke prevention 1/13/20   Joel Bill MD  
docusate sodium (COLACE) 100 mg capsule Take 1 Cap by mouth daily (after breakfast). Indications: constipation 1/13/20   Joel Bill MD  
predniSONE (DELTASONE) 20 mg tablet Take 20 mg by mouth daily (with breakfast). Indications: sarcoidosis Patient taking differently: Take 30 mg by mouth daily (with breakfast). Indications: sarcoidosis 1/14/20   Joel Bill MD  
ascorbic acid, vitamin C, (VITAMIN C) 250 mg tablet Take 1 Tab by mouth daily (with breakfast). 1/14/20   Joel Bill MD  
calcium citrate 200 mg (950 mg) tablet Take 1 Tab by mouth two (2) times a day. Indications: post-menopausal osteoporosis prevention 1/14/20   Joel Bill MD  
ferrous sulfate 325 mg (65 mg iron) tablet Take 1 Tab by mouth daily (with breakfast). 1/14/20   Joel Bill MD  
acetaminophen (TYLENOL) 325 mg tablet Take 2 Tabs by mouth every four (4) hours as needed for Pain. Indications: pain 1/13/20   Joel Bill MD  
brief disposable (ADULT) misc by Does Not Apply route. 1/9/20   Joel Bill MD  
methadone (DOLOPHINE) 5 mg tablet Take 4 Tabs by mouth daily. Max Daily Amount: 20 mg. 12/17/19   Gopi Araya MD  
polyethylene glycol (MIRALAX) 17 gram/dose powder Take 17 g by mouth daily. 1 tablespoon with 8 oz of water daily 11/29/19   Radha Theodore, BLANCA  
umeclidinium-vilanterol [facility] ELLIPTA) 62.5-25 mcg/actuation inhaler Take 1 Puff by inhalation daily. 11/20/19   Bharti Calles MD  
BD INSULIN SYRINGE ULTRA-FINE 1 mL 31 gauge x 5/16 syrg  11/5/19   Provider, Historical  
rosuvastatin (CRESTOR) 5 mg tablet TAKE ONE TABLET NIGHTLY Patient taking differently: Take 5 mg by mouth nightly.  TAKE ONE TABLET NIGHTLY 2/21/19   Barbara Keys MD  
albuterol (PROAIR HFA) 90 mcg/actuation inhaler INHALE 2 PUFFS EVERY 4 HOURS AS NEEDED FOR WHEEZING 1/31/19   Bibi Salas MD  
oxybutynin (DITROPAN) 5 mg tablet Take 5 mg by mouth two (2) times a day. Provider, Historical  
allopurinol (ZYLOPRIM) 100 mg tablet Take 100 mg by mouth daily. Provider, Historical  
insulin lispro (HUMALOG) 100 unit/mL injection To be given 15 min before meals: < 150 - None, 151-200 - 2 u, 201-250 - 4 u, 251-300 - 6 u, 301-350 - 8 u, 351-400 - 10 u, >400 - 12 u Patient taking differently: by SubCUTAneous route once. To be given 15 min before meals: < 150 - None, 151-200 - 2 u, 201-250 - 4 u, 251-300 - 6 u, 301-350 - 8 u, 351-400 - 10 u, >400 - 12 u 5/31/17   Sebas Rosario MD  
insulin syringe,safetyneedle 1 mL 30 gauge x 5/16\" syrg As directed 11/8/16   Dave Rubio MD  
Nebulizer Accessories Kit Use with nebulizer machine 10/31/12   Bibi Salas MD  
sertraline (ZOLOFT) 50 mg tablet Take 50 mg by mouth daily. Indications: Bipolar Depression    Provider, Historical  
 
 
Current Facility-Administered Medications Medication Dose Route Frequency  vancomycin (VANCOCIN) 1750 mg in  ml infusion  1,750 mg IntraVENous ONCE  
 NOREPINephrine (LEVOPHED) 8 mg in 5% dextrose 250mL (32 mcg/mL) infusion  0.5-16 mcg/min IntraVENous TITRATE  fentaNYL citrate (PF) injection 50 mcg  50 mcg IntraVENous NOW  PHENYLephrine (OLU-SYNEPHRINE) injection 0.2 mg  200 mcg IntraVENous ONCE  
 metroNIDAZOLE (FLAGYL) IVPB premix 500 mg  500 mg IntraVENous NOW  chlorhexidine (PERIDEX) 0.12 % mouthwash 10 mL  10 mL Oral Q12H  electrolyte-r (NORMOSOL R) infusion   IntraVENous CONTINUOUS  
 electrolyte-r (NORMOSOL R) bolus infusion 1,000 mL  1,000 mL IntraVENous ONCE  
 electrolyte-r (NORMOSOL R) infusion  sodium chloride (NS) flush 5-40 mL  5-40 mL IntraVENous Q8H  
 pantoprazole (PROTONIX) 40 mg in 0.9% sodium chloride 10 mL injection  40 mg IntraVENous Q12H  
 fentaNYL (PF) 900 mcg/30 ml infusion soln  0-200 mcg/hr IntraVENous TITRATE  midazolam in normal saline (VERSED) 2 mg/mL infusion  1-10 mg/hr IntraVENous TITRATE  cefepime (MAXIPIME) 2 g in sterile water (preservative free) 10 mL IV syringe  2 g IntraVENous Q12H  hydrocortisone Sod Succ (PF) (SOLU-CORTEF) injection 50 mg  50 mg IntraVENous Q6H  
 albuterol-ipratropium (DUO-NEB) 2.5 MG-0.5 MG/3 ML  3 mL Nebulization Q4H RT  
 budesonide (PULMICORT) 500 mcg/2 ml nebulizer suspension  500 mcg Nebulization BID RT  
 insulin lispro (HUMALOG) injection   SubCUTAneous Q6H  
 VANCOMYCIN INFORMATION NOTE   Other Rx Dosing/Monitoring Allergies Allergen Reactions  Moxifloxacin Rash  Pcn [Penicillins] Swelling  Sulfa (Sulfonamide Antibiotics) Swelling Social History Tobacco Use  Smoking status: Current Every Day Smoker Packs/day: 1.00 Years: 30.00 Pack years: 30.00 Types: Cigarettes Start date: 1969  Smokeless tobacco: Never Used Substance Use Topics  Alcohol use: No  
  
 
Family History Problem Relation Age of Onset  Seizures Other  Diabetes Mother  Hypertension Mother  Heart Disease Mother  Cancer Mother  Diabetes Father  Stroke Father  Heart Disease Father  Headache Sister  Depression Neg Hx  Suicide Neg Hx  Psychotic Disorder Neg Hx  Substance Abuse Neg Hx  Dementia Neg Hx Review of Systems: 
Review of systems not obtained due to patient factors. Objective:  
Vital Signs:   
Visit Vitals BP 96/65 Pulse (!) 109 Temp 98 °F (36.7 °C) Resp 20 Ht 5' 4\" (1.626 m) Wt 81.6 kg (180 lb) LMP 2012 (Exact Date) SpO2 100% BMI 30.90 kg/m² O2 Device: Other (comment) Temp (24hrs), Av °F (36.7 °C), Min:98 °F (36.7 °C), Max:98 °F (36.7 °C) Intake/Output:  
Last shift:       07 -  1900 In: 8508 [I.V.:0283] Out: - Last 3 shifts: No intake/output data recorded. Intake/Output Summary (Last 24 hours) at 2/29/2020 1539 Last data filed at 2/29/2020 1230 Gross per 24 hour Intake 3453 ml Output  Net 3453 ml Ventilator Settings: 
Mode Rate Tidal Volume Pressure FiO2 PEEP Assist control, VC+   450 ml    80 %(per post op abg) 5 cm H20 Peak airway pressure: 23 cm H2O Minute ventilation: 12 l/min ARDS network Guidelines:  
Lung protective strategy and Plateau  Pressure goal < 30 cm H2O goals Oxygenation Goals PaO2 55-80 mm Hg or SaO2 88-95% PH goal 7.30-7.45 VAP bundle: 
Reviewed. Felicity tube to suction at 20-30 cm Hg. Maintain [place] tube with 5-10ml air every 4 hours Routine oral care every 4 hours Elevation of head > 45 degree Daily sedation holiday and SBT evaluation starting at 6.00am. 
 
Physical Exam:  
 
General:  Intubated/Sedated, NAD Head:  Normocephalic, without obvious abnormality, atraumatic. Eyes:  Conjunctivae/corneas clear. PERRL, Nose: Nares normal. Septum midline. Mucosa normal. No drainage or sinus tenderness. Throat: Lips, mucosa, and tongue normal. Teeth and gums normal.  
Neck: Supple, symmetrical, trachea midline, no adenopathy, no carotid bruit and no JVD. Lungs:   Symmetrical chest rise; good AE bilat; course throughout; no wheezes/rales noted. Heart:  RRR, S1, S2 normal, no m/r/g Abdomen:   Soft, appropriately tender. Distended. Non-rigid. +midline surgical incision, dressing c/d/i. MOHINDER drain to wall suction with mild sanguinous output. Absent bowel sounds. No masses,  No organomegaly. Extremities: Extremities normal, atraumatic, no cyanosis or edema. Pulses: 2+ and symmetric all extremities. Skin: Skin color, texture, turgor normal. No rashes or lesions Neurologic: Sedated however pt will wake and follow commands. Devices: ETT, NGT, Renae, CVL - R IJ, Perry- L radial. 
  
 
Data:  
 
Recent Results (from the past 24 hour(s)) EKG, 12 LEAD, INITIAL  Collection Time: 02/29/20  8:57 AM  
 Result Value Ref Range Ventricular Rate 167 BPM  
 Atrial Rate 167 BPM  
 P-R Interval 92 ms QRS Duration 66 ms  
 Q-T Interval 256 ms  
 QTC Calculation (Bezet) 427 ms Calculated P Axis 86 degrees Calculated R Axis 85 degrees Calculated T Axis 77 degrees Diagnosis Sinus tachycardia with short OH with occasional premature ventricular  
complexes Nonspecific ST abnormality Abnormal ECG When compared with ECG of 17-JAN-2020 19:09, 
premature ventricular complexes are now present Confirmed by Edward Rothman MD, Reese Tovar ([id number]) on 2/29/2020 9:41:21 AM 
  
LACTIC ACID Collection Time: 02/29/20  9:16 AM  
Result Value Ref Range Lactic acid 6.7 (HH) 0.4 - 2.0 MMOL/L  
METABOLIC PANEL, COMPREHENSIVE Collection Time: 02/29/20  9:16 AM  
Result Value Ref Range Sodium 137 136 - 145 mmol/L Potassium 3.2 (L) 3.5 - 5.5 mmol/L Chloride 99 (L) 100 - 111 mmol/L  
 CO2 27 21 - 32 mmol/L Anion gap 11 3.0 - 18 mmol/L Glucose 111 (H) 74 - 99 mg/dL BUN 17 7.0 - 18 MG/DL Creatinine 1.36 (H) 0.6 - 1.3 MG/DL  
 BUN/Creatinine ratio 13 12 - 20 GFR est AA 48 (L) >60 ml/min/1.73m2 GFR est non-AA 39 (L) >60 ml/min/1.73m2 Calcium 9.5 8.5 - 10.1 MG/DL Bilirubin, total 0.9 0.2 - 1.0 MG/DL  
 ALT (SGPT) 20 13 - 56 U/L  
 AST (SGOT) 25 10 - 38 U/L Alk. phosphatase 160 (H) 45 - 117 U/L Protein, total 8.9 (H) 6.4 - 8.2 g/dL Albumin 3.0 (L) 3.4 - 5.0 g/dL Globulin 5.9 (H) 2.0 - 4.0 g/dL A-G Ratio 0.5 (L) 0.8 - 1.7    
CBC WITH AUTOMATED DIFF Collection Time: 02/29/20  9:16 AM  
Result Value Ref Range WBC 7.3 4.6 - 13.2 K/uL  
 RBC 5.90 (H) 4.20 - 5.30 M/uL  
 HGB 16.7 (H) 12.0 - 16.0 g/dL HCT 50.8 (H) 35.0 - 45.0 % MCV 86.1 74.0 - 97.0 FL  
 MCH 28.3 24.0 - 34.0 PG  
 MCHC 32.9 31.0 - 37.0 g/dL  
 RDW 15.7 (H) 11.6 - 14.5 % PLATELET 333 551 - 697 K/uL MPV 9.7 9.2 - 11.8 FL  
 NEUTROPHILS 50 42 - 75 % BAND NEUTROPHILS 22 (H) 0 - 5 % LYMPHOCYTES 21 20 - 51 % MONOCYTES 0 (L) 2 - 9 % EOSINOPHILS 0 0 - 5 % BASOPHILS 0 0 - 3 % METAMYELOCYTES 7 (H) 0 %  
 ABS. NEUTROPHILS 5.3 1.8 - 8.0 K/UL  
 ABS. LYMPHOCYTES 1.5 0.8 - 3.5 K/UL  
 ABS. MONOCYTES 0.0 0 - 1.0 K/UL  
 ABS. EOSINOPHILS 0.0 0.0 - 0.4 K/UL  
 ABS. BASOPHILS 0.0 0.0 - 0.06 K/UL  
 DF MANUAL PLATELET COMMENTS ADEQUATE PLATELETS    
 RBC COMMENTS ANISOCYTOSIS 
1+ NT-PRO BNP Collection Time: 02/29/20  9:16 AM  
Result Value Ref Range NT pro- 0 - 900 PG/ML  
CARDIAC PANEL,(CK, CKMB & TROPONIN) Collection Time: 02/29/20  9:16 AM  
Result Value Ref Range CK 58 26 - 192 U/L  
 CK - MB 2.0 <3.6 ng/ml CK-MB Index 3.4 0.0 - 4.0 % Troponin-I, QT 0.04 0.0 - 0.045 NG/ML  
POC G3 Collection Time: 02/29/20 10:51 AM  
Result Value Ref Range Device: VENT    
 FIO2 (POC) 100 % pH (POC) 7.169 (LL) 7.35 - 7.45    
 pCO2 (POC) 71.2 (H) 35.0 - 45.0 MMHG  
 pO2 (POC) 231 (H) 80 - 100 MMHG  
 HCO3 (POC) 25.9 22 - 26 MMOL/L  
 sO2 (POC) 100 (H) 92 - 97 % Base deficit (POC) 4 mmol/L Mode ASSIST CONTROL Tidal volume 420 ml Set Rate 14 bpm  
 PEEP/CPAP (POC) 8 cmH2O Allens test (POC) YES Inspiratory Time 1 sec Total resp. rate 14 Site LEFT RADIAL Specimen type (POC) ARTERIAL Performed by Rosezetta Head Volume control plus YES    
URINALYSIS W/ RFLX MICROSCOPIC Collection Time: 02/29/20 12:08 PM  
Result Value Ref Range Color DARK YELLOW Appearance TURBID Specific gravity 1.018 1.005 - 1.030    
 pH (UA) 5.0 5.0 - 8.0 Protein 100 (A) NEG mg/dL Glucose NEGATIVE  NEG mg/dL Ketone TRACE (A) NEG mg/dL Bilirubin NEGATIVE  NEG Blood SMALL (A) NEG Urobilinogen 1.0 0.2 - 1.0 EU/dL Nitrites NEGATIVE  NEG Leukocyte Esterase LARGE (A) NEG URINE MICROSCOPIC ONLY Collection Time: 02/29/20 12:08 PM  
Result Value Ref Range  WBC >100 (H) 0 - 4 /hpf  
 RBC 4 to 10 0 - 5 /hpf  
 Epithelial cells 1+ 0 - 5 /lpf Bacteria 2+ (A) NEG /hpf Mucus 1+ (A) NEG /lpf POC G3 Collection Time: 02/29/20  3:00 PM  
Result Value Ref Range Device: VENT    
 FIO2 (POC) 0.80 % pH (POC) 7.238 (LL) 7.35 - 7.45    
 pCO2 (POC) 47.1 (H) 35.0 - 45.0 MMHG  
 pO2 (POC) 86 80 - 100 MMHG  
 HCO3 (POC) 20.1 (L) 22 - 26 MMOL/L  
 sO2 (POC) 95 92 - 97 % Base deficit (POC) 7 mmol/L Mode ASSIST CONTROL Tidal volume 450 ml Set Rate 20 bpm  
 PEEP/CPAP (POC) 5 cmH2O  
 PIP (POC) 21 Allens test (POC) N/A Inspiratory Time 1.0 sec Total resp. rate 25 Site DRAWN FROM ARTERIAL LINE Patient temp. 98.0 Specimen type (POC) ARTERIAL Performed by Alexis Love Volume control plus YES Recent Labs  
  02/29/20 
1500 02/29/20 
1051 FIO2I 0.80 100 HCO3I 20.1* 25.9 PCO2I 47.1* 71.2* PHI 7.238* 7.169* PO2I 86 231* Telemetry:normal sinus rhythm Imaging: 
I have personally reviewed the patients radiographs and have reviewed the reports: 
 
CXR [02/29/20]: FINDINGS: 
  
Right IJ central venous catheter is now present with tip projected over the lower SVC. Endotracheal tube tip is suboptimally visualized due to overlying spinal fusion hardware but appears to be in satisfactory position approximately 3.3 cm cephalad to the alyssa. Esophagogastric tube projects extending below the left hemidiaphragm before it extends beyond the field-of-view. Bilateral thoracic spine fusion hardware again seen. EKG leads/wires and other catheter tubing overlie the patient. 
  
Bilateral lower lung opacities consistent with airspace disease and/or atelectasis, developing compared to the preceding radiograph, further assessed on chest CT performed earlier today. Pronounced pulmonary emphysematous disease with fibrotic changes again noted.  No evidence of pneumothorax.    
  
The cardiac silhouette is within normal limits in size  for technique.  
  
 The known intraperitoneal free air which was visualized underlying the diaphragm on the preceding radiograph and further assessed on CT scan performed earlier 
today is poorly visualized on the current radiograph likely reflecting differences in positioning, previously upright. IMPRESSION:  
Bilateral lower lung opacities consistent with airspace disease and/or atelectasis, developing compared to the preceding radiograph. 
  
CT CHEST/ABD/PELVIS W CONT [02/29/20]: FINDINGS: 
  
CT chest:  
  
Endotracheal tube tip is in place cephalad to the level of the alyssa. Esophagogastric tube is noted, tip in the mid stomach. 
  
Severe pulmonary emphysematous disease again seen with extensive bullous changes most conspicuous in the upper lungs. Scattered scarring. There has been interval development of consolidative appearing lung opacities in the lower lobes bilaterally consistent with airspace disease likely in addition to atelectasis. 
  
The small stable subpleural pulmonary nodule described in the right lower lobe on the recent chest CT is again seen, slightly better visualized on the current study measuring approximately 4 mm, unchanged compared to the CT of 2/6/2018 (axial 34).  Multifocal chronic nodular pleuro-parenchymal scarring in the left upper lobe without significant interval change compared to the preceding chest CT or study of 2/6/2018. 
  
No definite suspicious new pulmonary nodule/mass identified compared to the preceding CT. 
  
No evidence of pathologic lymph node enlargement is seen.   
  
No evidence of pleural or significant pericardial effusion. 
  
CT abdomen/pelvis:  
 
Small ascites, best seen around the liver and in the anterior pelvis adjacent to the distal descending and sigmoid colon. 
  
The spleen is heterogeneous in attenuation and demonstrates several rounded hypodensities as detailed on the recent chest CT of 1/21/2020.  
  
 Indeterminate attenuation masses in the kidneys bilaterally, 2.7 cm right kidney upper to midpole laterally, 2.1 cm left kidney interpolar region posteriorly. Further evaluation recommended, beginning with ultrasound might be helpful when clinically feasible if the corresponding lesions can be identified sonographically. Additional smaller subcentimeter renal hypodensities bilaterally, possibly cysts, too small to be specifically characterized. 
  
The liver, gallbladder, pancreas, and adrenal glands appear unremarkable. 
  
Scattered calcifications in the abdominal aorta and its major branches. The abdominal aorta enhances normally without abdominal aortic aneurysm. 
  
A few scattered small subcentimeter short axis lymph nodes bilaterally in the pelvis or retroperitoneum. No evidence of pathologic lymph node enlargement is 
seen. 
  
Moderate quantity of intraperitoneal free air in the nondependent anterior abdomen, consistent with perforated viscus. Additional scattered foci of free 
air elsewhere in the peritoneum and mesentery, including in the right upper quadrant near the gallbladder/duodenum, and scattered bilaterally in the mesenteric/peritoneal fat of the upper and lower abdomen. Grouped small foci of intraperitoneal free air are noted close to the colonic wall along the 
left/anterior side of the sigmoid colon (reference axial 112-119). The left hemicolon is diffusely mildly distended to the rectum containing gas, stool, and 
hyperdense material. Clinical correlation might be helpful regarding recent ingestion of hyperdense material. The rectum measures approximately 8.5 cm in caliber. The sigmoid measures approximately 7.3 cm in caliber on axial image 115. The site of the or free viscus is uncertain. Common sites could include duodenal or gastric perforation such as may be seen due to perforated ulcer.  However, there is small focal hypoattenuation along the wall of the sigmoid colon on series 2 axial images 111-113 which could potentially reflect a small mural defect such as could be seen related to constipation, stercoral colitis, diverticular disease. 
  
The appendix is seen within the ascites in the right lower quadrant, assessment for stranding in the periappendiceal fat limited by the ascites, without gross abnormal thickening of the appendix seen. No gas within the appendix. The right hemicolon appears grossly unremarkable. 
  
There is mild diffuse mural thickening of small bowel loops and mild prominence of enhancement, nonspecific. No definite associated pneumatosis. No portal venous gas is seen. The SMA/SMV appear to maintain usual orientation. Broadly speaking differential considerations could include inflammatory, infectious, 
ischemic. Clinical correlation is recommended including with lactate measurement. Conceivably sequela of peritonitis in the setting of bowel perforation? 
  
Uterus is present. Small gas is noted within the uterus and vagina, nonspecific, can be seen as a physiologic finding. 
  
  
On review of bone windows, there is a superior endplate compression fracture with mildly to moderately decreased vertebral body height at L2. Bilateral spinal fusion rods spanning from V4-C3-H95-T12. Severe compression fracture deformity again seen at T9. Compression fracture with rather pronounced decreased vertebral body height again seen at T5. Mild endplate indentations at several other levels including T4, T6, T10. The posterior midline fluid collection described on the preceding study was better visualized on the preceding study, extensive metallic hardware artifacts noted. 
  
IMPRESSION:  
  
Moderate quantity of intraperitoneal free air. Findings are consistent with perforated viscus.  The site of perforation is uncertain, as discussed above, 
possibly the sigmoid colon where there is apparent small subtle focal hypoattenuation along the wall as detailed above (reference axial images 111-113) close to region where several foci of free air are grouped. The left hemicolon is mildly distended and filled with hyperdense appearing stool. 
  
Mild diffuse mural thickening and enhancement of the small bowel as described. 
  
Small volume of ascites best seen around the liver and in the pelvis adjacent to the sigmoid. 
  
Bibasilar consolidations, right greater than left, suspicious for airspace disease/pneumonia. 
  
Indeterminate attenuation bilateral renal masses, follow-up evaluation recommended. 
  
Splenic hypodensities again seen as described on recent chest CT. 
  
Intubated. Severe pulmonary emphysematous disease and fibrotic changes. 
  
Multiple otherwise nonacute findings as detailed above. 
  
I have discussed these results directly with Dr. Serenity Shepherd in the ED at 12:20 p.m. in addition to the patient's consulted surgeon at 12:25 PM. Total of 60 min critical care time spent at bedside during the course of care providing evaluation,management and care decisions and ordering appropriate treatment related to critical care problems exclusive of procedures. The reason for providing this level of medical care for this critically ill patient was due a critical illness that impaired one or more vital organ systems such that there was a high probability of imminent or life threatening deterioration in the patients condition. This care involved high complexity decision making to assess, manipulate, and support vital system functions, to treat this degree vital organ system failure and to prevent further life threatening deterioration of the patients condition. Fredrick Da Silva PA-C 
02/29/20 Pulmonary Critical Care Medicine [facility] Staff Addendum I have independently evaluated the patient and reviewed the patient's chart. I have discussed the findings and care plan with ICU Care Team.   
 
I agree with the above evaluation, assessment and recommendations along with the following comments and observations. Patient evaluated just prior to going to OR in ED. Patient intubated and sedated. Abdominal distention noted. On IV pressor- Norepinephrine. Post intubation CXR notable for free air under the diaphragm. Patient then evaluated post op in the ICU. Case discussed with general surgeon. Abdomen remains open. Pending clinical course- plan to go back to OR tomorrow. Renae and right IJ-TLC placed in ED, left radial arterial line place in OR. Go blood return from CVL and artline. All lines functioning properly. Post op CXR with proper ETT and line placement. Bilateral breath sounds. Patient sedated on intubated. Abdomen -obese and distended with  midline incision with surgical dressing. MOHINDER drains in place on wall suction with serosanguinous drainage Patient requiring high dose of nor-epinephrine for septic shock from abdominal perforation with large stool burden. Due to recent paraparesis there could also be a secondary neurogenic component. Plan to add phenylephrine as second pressor. Will try to avoid vasopressin at this time. Stress dose steroids added due to steroid history for treatment of sarcoid. Will add antifungal therapy in addition to antimicrobial therapy. Follow up on pending cultures. Prior E. Coli isolated in 2017 from patient with resistance but sensitive to current antibiotics. Patient also with recent thoracic spine surgery with rods/ hardware in place. Will consult ID to assist with antimicrobial management. Continuing with supportive care at this time. Further recommendations pending clinical course Patient remains critically ill. Prognosis uncertain at this time- guarded at best. 
 
 
Critical Care Time: The services I provided to this patient were to treat and/or prevent clinically significant deterioration that could result in the failure of one or more body systems and/or organ systems Services included the following: 
-reviewing nursing notes and old charts 
-vital sign assessments- reassessed BP at bedside- moved cuff to left upper arm.  
-direct patient care 
-medication orders and management 
-interpreting and reviewing diagnostic studies/labs 
-re-evaluations 
-documentation time Aggregate critical care time was 60   minutes, which includes only time during which I was engaged in work directly related to the patient's care as described above. During this entire length of time I was immediately available to the patient. The reason for providing this level of medical care for this critically ill patient was due a critical illness that impaired one or more vital organ systems such that there was a high probability of imminent or life threatening deterioration in the patients condition. This care involved high complexity decision making to assess, manipulate, and support vital system functions, to treat this degreee vital organ system failure and to prevent further life threatening deterioration of the patients condition Complex decision making was made in the evaluation and management plans during this consultation. More than 50% of time was spent in counseling and coordination of care including review of data and discussion with other team members. Josefina Mcintosh DO, [facility], Sleep and Critical Care Medicine 9:57 PM

## 2020-02-29 NOTE — ED NOTES
Dr Unique Buenrostro at bedside to take pt to OR. RT called for transport.  Pt moving arms, verbal order from Dr Unique Buenrostro to give 2mg versed IV

## 2020-02-29 NOTE — ED PROVIDER NOTES
EMERGENCY DEPARTMENT HISTORY AND PHYSICAL EXAM 
This was created with voice recognition software and transcription errors may be present. 9:23 AM 
Date: 2/29/2020 Patient Name: Rosalia Nogueira History of Presenting Illness Chief Complaint: 
 
History Provided By:  
 
HPI: Rosalia Nogueira is a 62 y.o. female past medical history of anxiety bipolar chronic cellulitis chronic back pain COPD depression reflux hep C IV drug use recently admitted for sepsis in late January actually followed up with her pulmonologist yesterday presents with severe abdominal pain onset this morning associated with worsening shortness of breath. Patient is on home oxygen EMS found her off home oxygen respiratory distress and severe abdominal pain. She was seen immediately upon arrival.  She had a heart rate of 160 blood pressure approximately 100 and oxygen sats of 90% on nonrebreather. Patient is critically ill history is limited at this time PCP: Julio C Taveras NP Past History Past Medical History: 
Past Medical History:  
Diagnosis Date  Abnormally low high density lipoprotein (HDL) cholesterol with hypertriglyceridemia Lipid profile (11/6/2016) showed , , HDL 38, LDL 37  Acute blood loss as cause of postoperative anemia 12/12/2019  Anxiety  Bipolar affective disorder ([address] 12/5/2012  Cellulitis of right forearm 05/04/2017  Chronic back pain  Chronic obstructive pulmonary disease (COPD) ([address] SOB, on [place] O2 Recent admission with psychosis  Chronic respiratory failure with hypoxia ([address] On home oxygen inhalation at 3 LPM via NC  
 Contusion of left elbow 5/4/2017  Depression  Diabetic neuropathy associated with type 2 diabetes mellitus ([address]  Diastolic dysfunction without heart failure Stable on diuretics  Falls frequently  Gastroesophageal reflux disease with hiatal hernia  Generalized osteoarthritis of multiple sites  Gout On Allopurinol  History of acute renal failure 2013  History of back injury   
 jumped out of second story window  History of fracture due to fall 2019  
 History of hepatitis C   
 treated  History of penicillin allergy  History of vitamin D deficiency 5/10/2017  Hypertensive heart disease without heart failure Better controlled  Hyperuricemia 2017  Hypothyroidism  Hypoxemia requiring supplemental oxygen 2014  Intravenous drug user 2017  Long term current use of aspirin  Memory difficulty  Menopause  Mixed connective tissue disease ([address] 5/10/2017  Obesity, Class I, BMI 30-34.9  Olecranon bursitis of right elbow 2017  Opioid dependence ([address] On Methadone  Recurrent genital herpes 2013  Right Achilles tendinitis 2017  Sarcoidosis  Sickle cell trait [facility]  Tobacco use disorder  Type 2 diabetes mellitus with diabetic neuropathy, with long-term current use of insulin ([facility] HbA1c (2019) = 7.1  Urge urinary incontinence 5/10/2017  Venous insufficiency  Wears glasses Past Surgical History: 
Past Surgical History:  
Procedure Laterality Date [facility]  HX  SECTION    
 HX CYST REMOVAL Left 1979 Left foot  HX OTHER SURGICAL Left 2017 S/P incision and drainage of the abscess of the left hand and the left foot (2017 - Dr. Amy Turcios. Sherrod Sacks)  HX THORACIC LAMINECTOMY  2019 S/P T10, T9, T8 laminectomy; T7, T8, T9, T10, T11, T12 posterior thoracic fusion; segmental spinal instrumentation; K2M Davis type, T7-T8, T11-T12 (2019 - Dr. Ros Pathak)  HX TUBAL LIGATION Family History: 
Family History Problem Relation Age of Onset  Seizures Other  Diabetes Mother  Hypertension Mother  Heart Disease Mother  Cancer Mother  Diabetes Father  Stroke Father  Heart Disease Father  Headache Sister  Depression Neg Hx  Suicide Neg Hx  Psychotic Disorder Neg Hx  Substance Abuse Neg Hx  Dementia Neg Hx Social History: 
Social History Tobacco Use  Smoking status: Current Every Day Smoker Packs/day: 1.00 Years: 30.00 Pack years: 30.00 Types: Cigarettes Start date: 12/2/1969  Smokeless tobacco: Never Used Substance Use Topics  Alcohol use: No  
 Drug use: No  
  Types: Cocaine, Heroin Comment: +Cocaine Screen 2013 Allergies: Allergies Allergen Reactions  Moxifloxacin Rash  Pcn [Penicillins] Swelling  Sulfa (Sulfonamide Antibiotics) Swelling Review of Systems Review of Systems Unable to perform ROS: Acuity of condition 10 point review of systems otherwise negative unless noted in HPI. Physical Exam  
 
 
Physical Exam 
Vitals signs reviewed. Constitutional:   
   Comments: Patient is in respiratory and painful distress she is anxious her breath sounds are audible and coarse from across the room. Her abdomen is distended and tympanitic HENT:  
   Head: Normocephalic. Eyes:  
   Pupils: Pupils are equal, round, and reactive to light. Neck: Musculoskeletal: Normal range of motion. Cardiovascular:  
   Comments: Tachycardic and regular Pulmonary:  
   Comments: Audible coarse breath sounds bilaterally Abdominal:  
   Comments: Distended with tympany Musculoskeletal: Normal range of motion. Skin: 
   General: Skin is warm. Capillary Refill: Capillary refill takes less than 2 seconds. Neurological:  
   General: No focal deficit present. Diagnostic Study Results Vital Signs EKG: 
Labs:  
Imaging:  
 
Medical Decision Making ED Course: Progress Notes, Reevaluation, and Consults: 
 
 
Provider Notes (Medical Decision Making):  
 
Critically ill seen on arrival EKG shows sinus tachycardia at 160.   Oxygen sats initially 90 now on CPAP as a temporizing measure with improvement to 100%. Blood pressure improved to about 389 systolic. Basically this is a temporizing measure to optimize and then intubate the patient. Will have neuro be ready and on standby intubate and then obtain stat CT. 
   
9:33 AM bedside x-ray reviewed. May be a slight effusion but nothing significant. Patient states she is actually feeling better she is requesting pain medication will start with some pain medication Anesthesia was bedside patient was optimized on BiPAP and pretreated with ketamine for intubation. She maintained a little too much tone so we used rocuronium. I was able to debate her on my first attempt using video laryngoscopy without difficulty. Anesthesia Dr. Lily Gupta gave her a small bolus of nor epi prior to intubation. He requested albumin to be given post intubation. She is currently going over to CAT scan for further evaluation. CT reviewed. Appears to be free air. ET and NG tube appear to be in place. Discussed case with Dr. Radha Urena requests official read. Radiology is looking at that now. D/w Dr Torsten Nunez; enroute as xray shows free air. Dw/ dr Samira Blanca; ICU Bedside for Right IJ placed by Dr. Nicole Deras Sepsis reevaluation done approximately 11:00 AM.  Patient remains in septic shock continue pressors intubation and 2 OR for repair perforated viscus. Critical Care Time: The services I provided to this patient were to treat and/or prevent clinically significant deterioration that could result in the failure of one or more body systems and/or organ systems due to septic shock min Services included the following: 
-reviewing nursing notes and old charts 
-vital sign assessments 
-direct patient care 
-medication orders and management 
-interpreting and reviewing diagnostic studies/labs 
-re-evaluations 
-documentation time Aggregate critical care time was  minutes, which includes only time during which I was engaged in work directly related to the patient's care as described above, whether I was at bedside or elsewhere in the Emergency Department. It did not include time spent performing other reported procedures or the services of residents, students, nurses, or advance practice providers. No att. providers found 1:52 PM 
 
 
 
Diagnosis Clinical Impression: No diagnosis found. Disposition: 
 
Patient's Medications Start Taking No medications on file Continue Taking ACETAMINOPHEN (TYLENOL) 325 MG TABLET    Take 2 Tabs by mouth every four (4) hours as needed for Pain. Indications: pain ALBUTEROL (PROAIR HFA) 90 MCG/ACTUATION INHALER    INHALE 2 PUFFS EVERY 4 HOURS AS NEEDED FOR WHEEZING  
 ALBUTEROL-IPRATROPIUM (DUO-NEB) 2.5 MG-0.5 MG/3 ML NEBU    3 mL by Nebulization route every six (6) hours as needed for Wheezing. ALLOPURINOL (ZYLOPRIM) 100 MG TABLET    Take 100 mg by mouth daily. ARIPIPRAZOLE (ABILIFY) 10 MG TABLET    Take 1 Tab by mouth. ASCORBIC ACID, VITAMIN C, (VITAMIN C) 250 MG TABLET    Take 1 Tab by mouth daily (with breakfast). ASPIRIN 81 MG CHEWABLE TABLET    Take 1 Tab by mouth daily (with breakfast). Indications: stroke prevention ASPIRIN-CALCIUM CARBONATE 81 MG-300 MG CALCIUM(777 MG) TAB    Take 1 Tab by mouth. BD INSULIN SYRINGE ULTRA-FINE 1 ML 31 GAUGE X 5/16 SYRG      
 BRIEF DISPOSABLE (ADULT) MISC    by Does Not Apply route. BUSPIRONE (BUSPAR) 10 MG TABLET    Take 10 mg by mouth three (3) times daily. Indications: repeated episodes of anxiety BUSPIRONE (BUSPAR) 15 MG TABLET    Take 15 mg by mouth two (2) times a day. Indications: repeated episodes of anxiety CALCIUM CITRATE 200 MG (950 MG) TABLET    Take 1 Tab by mouth two (2) times a day. Indications: post-menopausal osteoporosis prevention CHOLECALCIFEROL (VITAMIN D3) (1000 UNITS /25 MCG) TABLET    Take 1,000 Units by mouth two (2) times a day. DIVALPROEX DR (DEPAKOTE) 250 MG TABLET    Take 1 Tab by mouth. DOCUSATE SODIUM (COLACE) 100 MG CAPSULE    Take 1 Cap by mouth daily (after breakfast). Indications: constipation DOXYCYCLINE (MONODOX) 100 MG CAPSULE      
 FAMOTIDINE (PEPCID) 20 MG TABLET    Take 20 mg by mouth nightly. FERROUS SULFATE 325 MG (65 MG IRON) TABLET    Take 1 Tab by mouth daily (with breakfast). GABAPENTIN (NEURONTIN) 300 MG CAPSULE    Take 1 Cap by mouth. INSULIN GLARGINE (LANTUS U-100 INSULIN) 100 UNIT/ML INJECTION    20 Units by SubCUTAneous route Daily (before breakfast). INSULIN LISPRO (HUMALOG) 100 UNIT/ML INJECTION    To be given 15 min before meals: < 150 - None, 151-200 - 2 u, 201-250 - 4 u, 251-300 - 6 u, 301-350 - 8 u, 351-400 - 10 u, >400 - 12 u INSULIN SYRINGE,SAFETYNEEDLE 1 ML 30 GAUGE X 5/16\" SYRG    As directed IPRATROPIUM (ATROVENT) 0.02 % SOLN      
 LEVOTHYROXINE (SYNTHROID) 100 MCG TABLET    Take 100 mcg by mouth Daily (before breakfast). METHADONE (DOLOPHINE) 5 MG TABLET    Take 4 Tabs by mouth daily. Max Daily Amount: 20 mg. METHYLPREDNISOLONE (MEDROL DOSEPACK) 4 MG TABLET NEBULIZER ACCESSORIES KIT    Use with nebulizer machine OXYBUTYNIN (DITROPAN) 5 MG TABLET    Take 5 mg by mouth two (2) times a day. OXYCODONE-ACETAMINOPHEN (PERCOCET) 5-325 MG PER TABLET    Take 1 Tab by mouth. OXYGEN-AIR DELIVERY SYSTEMS    3 L/min by Nasal route continuous. [employer] O2 POLYETHYLENE GLYCOL (MIRALAX) 17 GRAM/DOSE POWDER    Take 17 g by mouth daily. 1 tablespoon with 8 oz of water daily PREDNISONE (DELTASONE) 20 MG TABLET    Take 20 mg by mouth daily (with breakfast). Indications: sarcoidosis ROFLUMILAST (DALIRESP) 250 MCG TAB    Take 250 mcg by mouth daily. ROSUVASTATIN (CRESTOR) 5 MG TABLET    TAKE ONE TABLET NIGHTLY SERTRALINE (ZOLOFT) 50 MG TABLET    Take 50 mg by mouth daily. Indications: Bipolar Depression TOLTERODINE (DETROL) 1 MG TABLET    Take 1 Tab by mouth. TRAMADOL (ULTRAM) 50 MG TABLET    Take 1 Tab by mouth. TRAZODONE (DESYREL) 100 MG TABLET    Take 1 Tab by mouth. UMECLIDINIUM-VILANTEROL (ANORO ELLIPTA) 62.5-25 MCG/ACTUATION INHALER    Take 1 Puff by inhalation daily. These Medications have changed No medications on file Stop Taking No medications on file

## 2020-02-29 NOTE — ED NOTES
Summary of Events: 
46- Dr Chan Has with anesthesia arrived tot he bedside. 0953-Ketmaine given IV by Dr hCan Has 
7154- Rocuronium given by Dr Chan Has IV 
1255- intubated by DR Danielle Keane. 20cm at the lip. Size 7.5mm tube 
0955- bagged through ET tube by RT.  
1007-NG tube placed by Dr Danielle Keane 1027- CT in progress.

## 2020-02-29 NOTE — CONSULTS
General Surgery Consult Stephen Sinha  Admit date: 2020 MRN: [id number]     : 1955     Age: 62 y.o. Attending Physician: Kavitha Shepard MD [facility] History of Present Illness:  
  
Varsha Durbin is a 62 y.o. female who I was called because of picture of septic shock, intubated in the ER and free air on a KUB and CT scan pending. When I came to examine the patient she was already intubated, and I took the history from the chart and ER team. I also got a consent and because there was no family, I asked the ER physician to cosign the consent with me. The patient has multiple significant medical problems, and later I knew from her daughter that she has been complaining of chest and abdominal pain for few days duration as well as constipation. She was suppose to go see her pulmonologist yesterday. In the ER they thought it is pulmonary issues, but her KUB showed free air. Before intubation the ER team stated that she has peritoneal signs. For me she was intubated and sedated so it was hard to ases for peritoneal signs but she was very distended. Patient Active Problem List  
 Diagnosis Date Noted  Bipolar affective disorder ([address] 2012 Priority: 1 - One  Perforated viscus 2020  Septic shock ([address] 2020  Sepsis ([address] 2020  Chronic respiratory failure with hypoxia ([address]  Gout  Menopause  Long term current use of aspirin  Opioid dependence ([address]  Tobacco use disorder  Acute blood loss as cause of postoperative anemia 2019  Impaired mobility and ADLs 2019  Spinal cord compression ([address] 2019  Compression fracture of body of thoracic vertebra (HCC) 2019  Status post laminectomy with spinal fusion 2019  
 History of fracture due to fall 2019  Hyperuricemia 2017  Mixed connective tissue disease ([address] 05/10/2017  History of vitamin D deficiency 05/10/2017  Urge urinary incontinence 05/10/2017  History of penicillin allergy  Falls frequently  Gastroesophageal reflux disease with hiatal hernia  Memory difficulty  Cellulitis of arm, right 05/04/2017  Olecranon bursitis of right elbow 05/04/2017  Contusion of left elbow 05/04/2017  Cellulitis of right forearm 05/04/2017  Intravenous drug user 05/02/2017  Right Achilles tendinitis 05/02/2017  Cellulitis and abscess of hand 04/13/2017  Cellulitis and abscess of foot 04/13/2017  Abnormal nuclear stress test 11/17/2016  Hypoxemia requiring supplemental oxygen 12/29/2014  Abscess of right arm 06/03/2013  History of acute renal failure 05/31/2013  Recurrent genital herpes 05/31/2013  Hypothyroidism  Sarcoidosis  Diastolic dysfunction without heart failure  Chronic obstructive pulmonary disease (COPD) ([address]  Hypertensive heart disease without heart failure  Depression  History of back injury  Type 2 diabetes mellitus with diabetic neuropathy, with long-term current use of insulin ([address]  Anxiety  Wears glasses  History of hepatitis C   
 Sickle cell trait ([address]  Abnormally low high density lipoprotein (HDL) cholesterol with hypertriglyceridemia  Generalized osteoarthritis of multiple sites  Diabetic neuropathy associated with type 2 diabetes mellitus ([address]  Venous insufficiency  Obesity, Class I, BMI 30-34.9  Chronic back pain Past Medical History:  
Diagnosis Date  Abnormally low high density lipoprotein (HDL) cholesterol with hypertriglyceridemia Lipid profile (11/6/2016) showed , , HDL 38, LDL 37  Acute blood loss as cause of postoperative anemia 12/12/2019  Anxiety  Bipolar affective disorder ([address] 12/5/2012  Cellulitis of right forearm 05/04/2017  Chronic back pain  Chronic obstructive pulmonary disease (COPD) ([address] SOB, on [place] O2 Recent admission with psychosis  Chronic respiratory failure with hypoxia ([address] On home oxygen inhalation at 3 LPM via NC  
 Contusion of left elbow 2017  Depression  Diabetic neuropathy associated with type 2 diabetes mellitus ([address]  Diastolic dysfunction without heart failure Stable on diuretics  Falls frequently  Gastroesophageal reflux disease with hiatal hernia  Generalized osteoarthritis of multiple sites  Gout On Allopurinol  History of acute renal failure 2013  History of back injury   
 jumped out of second story window  History of fracture due to fall 2019  
 History of hepatitis C   
 treated  History of penicillin allergy  History of vitamin D deficiency 5/10/2017  Hypertensive heart disease without heart failure Better controlled  Hyperuricemia 2017  Hypothyroidism  Hypoxemia requiring supplemental oxygen 2014  Intravenous drug user 2017  Long term current use of aspirin  Memory difficulty  Menopause  Mixed connective tissue disease ([address] 5/10/2017  Obesity, Class I, BMI 30-34.9  Olecranon bursitis of right elbow 2017  Opioid dependence ([address] On Methadone  Recurrent genital herpes 2013  Right Achilles tendinitis 2017  Sarcoidosis  Sickle cell trait ([address]  Tobacco use disorder  Type 2 diabetes mellitus with diabetic neuropathy, with long-term current use of insulin ([address] HbA1c (2019) = 7.1  Urge urinary incontinence 5/10/2017  Venous insufficiency  Wears glasses Past Surgical History:  
[facility]X  SECTION    
 HX CYST REMOVAL Left  Left foot  HX OTHER SURGICAL Left 2017 S/P incision and drainage of the abscess of the left hand and the left foot (2017 - Dr. Yosef Rm. Rolando Nava)  HX THORACIC LAMINECTOMY  12/11/2019 S/P T10, T9, T8 laminectomy; T7, T8, T9, T10, T11, T12 posterior thoracic fusion; segmental spinal instrumentation; K2M Davis type, T7-T8, T11-T12 (12/11/2019 - Dr. Sharath Gilbert)  HX TUBAL LIGATION Social History Tobacco Use  Smoking status: Current Every Day Smoker Packs/day: 1.00 Years: 30.00 Pack years: 30.00 Types: Cigarettes Start date: 12/2/1969  Smokeless tobacco: Never Used Substance Use Topics  Alcohol use: No  
  
Social History Tobacco Use Smoking Status Current Every Day Smoker  Packs/day: 1.00  Years: 30.00  Pack years: 30.00  Types: Cigarettes  Start date: 12/2/1969 Smokeless Tobacco Never Used Family History Problem Relation Age of Onset  Seizures Other  Diabetes Mother  Hypertension Mother  Heart Disease Mother  Cancer Mother  Diabetes Father  Stroke Father  Heart Disease Father  Headache Sister  Depression Neg Hx  Suicide Neg Hx  Psychotic Disorder Neg Hx  Substance Abuse Neg Hx  Dementia Neg Hx Current Facility-Administered Medications Medication Dose Route Frequency  sodium chloride (NS) flush 5-10 mL  5-10 mL IntraVENous PRN  
 vancomycin (VANCOCIN) 1750 mg in  ml infusion  1,750 mg IntraVENous ONCE  
 NOREPINephrine (LEVOPHED) 8 mg in 5% dextrose 250mL (32 mcg/mL) infusion  0.5-16 mcg/min IntraVENous TITRATE  fentaNYL citrate (PF) injection 50 mcg  50 mcg IntraVENous NOW  PHENYLephrine (OLU-SYNEPHRINE) injection 0.2 mg  200 mcg IntraVENous ONCE  
 metroNIDAZOLE (FLAGYL) IVPB premix 500 mg  500 mg IntraVENous NOW  hydrocortisone Sod Succ (PF) (SOLU-CORTEF) injection 100 mg  100 mg IntraVENous ONCE Current Outpatient Medications Medication Sig  
 roflumilast (DALIRESP) 250 mcg tab Take 250 mcg by mouth daily.  ARIPiprazole (ABILIFY) 10 mg tablet Take 1 Tab by mouth.  aspirin-calcium carbonate 81 mg-300 mg calcium(777 mg) tab Take 1 Tab by mouth.  divalproex DR (DEPAKOTE) 250 mg tablet Take 1 Tab by mouth.  doxycycline (MONODOX) 100 mg capsule  gabapentin (NEURONTIN) 300 mg capsule Take 1 Cap by mouth.  ipratropium (ATROVENT) 0.02 % soln  methylPREDNISolone (MEDROL DOSEPACK) 4 mg tablet  oxyCODONE-acetaminophen (PERCOCET) 5-325 mg per tablet Take 1 Tab by mouth.  tolterodine (DETROL) 1 mg tablet Take 1 Tab by mouth.  traMADol (ULTRAM) 50 mg tablet Take 1 Tab by mouth.  traZODone (DESYREL) 100 mg tablet Take 1 Tab by mouth.  busPIRone (BUSPAR) 15 mg tablet Take 15 mg by mouth two (2) times a day. Indications: repeated episodes of anxiety  OXYGEN-AIR DELIVERY SYSTEMS 3 L/min by Nasal route continuous. [employer] O2  
 albuterol-ipratropium (DUO-NEB) 2.5 mg-0.5 mg/3 ml nebu 3 mL by Nebulization route every six (6) hours as needed for Wheezing.  busPIRone (BUSPAR) 10 mg tablet Take 10 mg by mouth three (3) times daily. Indications: repeated episodes of anxiety  cholecalciferol (VITAMIN D3) (1000 Units /25 mcg) tablet Take 1,000 Units by mouth two (2) times a day.  levothyroxine (SYNTHROID) 100 mcg tablet Take 100 mcg by mouth Daily (before breakfast).  insulin glargine (LANTUS U-100 INSULIN) 100 unit/mL injection 20 Units by SubCUTAneous route Daily (before breakfast).  famotidine (PEPCID) 20 mg tablet Take 20 mg by mouth nightly.  aspirin 81 mg chewable tablet Take 1 Tab by mouth daily (with breakfast). Indications: stroke prevention  docusate sodium (COLACE) 100 mg capsule Take 1 Cap by mouth daily (after breakfast). Indications: constipation  predniSONE (DELTASONE) 20 mg tablet Take 20 mg by mouth daily (with breakfast). Indications: sarcoidosis (Patient taking differently: Take 30 mg by mouth daily (with breakfast). Indications: sarcoidosis)  ascorbic acid, vitamin C, (VITAMIN C) 250 mg tablet Take 1 Tab by mouth daily (with breakfast).  calcium citrate 200 mg (950 mg) tablet Take 1 Tab by mouth two (2) times a day. Indications: post-menopausal osteoporosis prevention  ferrous sulfate 325 mg (65 mg iron) tablet Take 1 Tab by mouth daily (with breakfast).  acetaminophen (TYLENOL) 325 mg tablet Take 2 Tabs by mouth every four (4) hours as needed for Pain. Indications: pain  brief disposable (ADULT) misc by Does Not Apply route.  methadone (DOLOPHINE) 5 mg tablet Take 4 Tabs by mouth daily. Max Daily Amount: 20 mg.  polyethylene glycol (MIRALAX) 17 gram/dose powder Take 17 g by mouth daily. 1 tablespoon with 8 oz of water daily  umeclidinium-vilanterol (ANORO ELLIPTA) 62.5-25 mcg/actuation inhaler Take 1 Puff by inhalation daily.  BD INSULIN SYRINGE ULTRA-FINE 1 mL 31 gauge x 5/16 syrg  rosuvastatin (CRESTOR) 5 mg tablet TAKE ONE TABLET NIGHTLY (Patient taking differently: Take 5 mg by mouth nightly. TAKE ONE TABLET NIGHTLY)  albuterol (PROAIR HFA) 90 mcg/actuation inhaler INHALE 2 PUFFS EVERY 4 HOURS AS NEEDED FOR WHEEZING  
 oxybutynin (DITROPAN) 5 mg tablet Take 5 mg by mouth two (2) times a day.  allopurinol (ZYLOPRIM) 100 mg tablet Take 100 mg by mouth daily.  insulin lispro (HUMALOG) 100 unit/mL injection To be given 15 min before meals: < 150 - None, 151-200 - 2 u, 201-250 - 4 u, 251-300 - 6 u, 301-350 - 8 u, 351-400 - 10 u, >400 - 12 u (Patient taking differently: by SubCUTAneous route once. To be given 15 min before meals: < 150 - None, 151-200 - 2 u, 201-250 - 4 u, 251-300 - 6 u, 301-350 - 8 u, 351-400 - 10 u, >400 - 12 u)  insulin syringe,safetyneedle 1 mL 30 gauge x 5/16\" syrg As directed  Nebulizer Accessories Kit Use with nebulizer machine  sertraline (ZOLOFT) 50 mg tablet Take 50 mg by mouth daily. Indications: Bipolar Depression Allergies Allergen Reactions  Moxifloxacin Rash  Pcn [Penicillins] Swelling  Sulfa (Sulfonamide Antibiotics) Swelling Review of Systems: 
Review of systems not obtained due to patient factors. Objective:  
 
Visit Vitals BP 96/64 Pulse (!) 139 Resp 21 Ht 5' 4\" (1.626 m) Wt 81.6 kg (180 lb) LMP 11/25/2012 (Exact Date) SpO2 91% BMI 30.90 kg/m² Physical Exam:   
 
General:  Uncooperative because she is intubated. ER team placing a central line. Eyes:  conjunctivae and sclerae normal, pupils equal, round, reactive to light Throat & Neck: no erythema or exudates noted and neck supple and symmetrical; no palpable masses Lungs:   clear to auscultation bilaterally Heart:  Regular rate and rhythm Abdomen:   rounded, obese, protuberant and distended, hard to assess for peritoneal signs because she is sedated and intubated. Hard to asses for masses or organomegaly. No clear abdominal all hernias. Extremities: extremities normal, atraumatic, no cyanosis or edema Skin: Normal.  
   
Imaging and Lab Review: CBC:  
Lab Results Component Value Date/Time WBC 7.3 02/29/2020 09:16 AM  
 RBC 5.90 (H) 02/29/2020 09:16 AM  
 HGB 16.7 (H) 02/29/2020 09:16 AM  
 HCT 50.8 (H) 02/29/2020 09:16 AM  
 PLATELET 006 62/04/5153 09:16 AM  
 
BMP:  
Lab Results Component Value Date/Time Glucose 111 (H) 02/29/2020 09:16 AM  
 Sodium 137 02/29/2020 09:16 AM  
 Potassium 3.2 (L) 02/29/2020 09:16 AM  
 Chloride 99 (L) 02/29/2020 09:16 AM  
 CO2 27 02/29/2020 09:16 AM  
 BUN 17 02/29/2020 09:16 AM  
 Creatinine 1.36 (H) 02/29/2020 09:16 AM  
 Calcium 9.5 02/29/2020 09:16 AM  
 
CMP: 
Lab Results Component Value Date/Time  Glucose 111 (H) 02/29/2020 09:16 AM  
 Sodium 137 02/29/2020 09:16 AM  
 Potassium 3.2 (L) 02/29/2020 09:16 AM  
 Chloride 99 (L) 02/29/2020 09:16 AM  
 CO2 27 02/29/2020 09:16 AM  
 BUN 17 02/29/2020 09:16 AM  
 Creatinine 1.36 (H) 02/29/2020 09:16 AM  
 Calcium 9.5 02/29/2020 09:16 AM  
 Anion gap 11 02/29/2020 09:16 AM  
 BUN/Creatinine ratio 13 02/29/2020 09:16 AM  
 Alk. phosphatase 160 (H) 02/29/2020 09:16 AM  
 Protein, total 8.9 (H) 02/29/2020 09:16 AM  
 Albumin 3.0 (L) 02/29/2020 09:16 AM  
 Globulin 5.9 (H) 02/29/2020 09:16 AM  
 A-G Ratio 0.5 (L) 02/29/2020 09:16 AM  
 
 
Recent Results (from the past 24 hour(s)) EKG, 12 LEAD, INITIAL Collection Time: 02/29/20  8:57 AM  
Result Value Ref Range Ventricular Rate 167 BPM  
 Atrial Rate 167 BPM  
 P-R Interval 92 ms QRS Duration 66 ms  
 Q-T Interval 256 ms  
 QTC Calculation (Bezet) 427 ms Calculated P Axis 86 degrees Calculated R Axis 85 degrees Calculated T Axis 77 degrees Diagnosis Sinus tachycardia with short NH with occasional premature ventricular  
complexes Nonspecific ST abnormality Abnormal ECG When compared with ECG of 17-JAN-2020 19:09, 
premature ventricular complexes are now present Confirmed by Christine Stoddard MD, eVlvet Watts ([id number]) on 2/29/2020 9:41:21 AM 
  
LACTIC ACID Collection Time: 02/29/20  9:16 AM  
Result Value Ref Range Lactic acid 6.7 (HH) 0.4 - 2.0 MMOL/L  
METABOLIC PANEL, COMPREHENSIVE Collection Time: 02/29/20  9:16 AM  
Result Value Ref Range Sodium 137 136 - 145 mmol/L Potassium 3.2 (L) 3.5 - 5.5 mmol/L Chloride 99 (L) 100 - 111 mmol/L  
 CO2 27 21 - 32 mmol/L Anion gap 11 3.0 - 18 mmol/L Glucose 111 (H) 74 - 99 mg/dL BUN 17 7.0 - 18 MG/DL Creatinine 1.36 (H) 0.6 - 1.3 MG/DL  
 BUN/Creatinine ratio 13 12 - 20 GFR est AA 48 (L) >60 ml/min/1.73m2 GFR est non-AA 39 (L) >60 ml/min/1.73m2 Calcium 9.5 8.5 - 10.1 MG/DL Bilirubin, total 0.9 0.2 - 1.0 MG/DL  
 ALT (SGPT) 20 13 - 56 U/L  
 AST (SGOT) 25 10 - 38 U/L Alk. phosphatase 160 (H) 45 - 117 U/L Protein, total 8.9 (H) 6.4 - 8.2 g/dL Albumin 3.0 (L) 3.4 - 5.0 g/dL Globulin 5.9 (H) 2.0 - 4.0 g/dL A-G Ratio 0.5 (L) 0.8 - 1.7    
CBC WITH AUTOMATED DIFF Collection Time: 02/29/20  9:16 AM  
Result Value Ref Range WBC 7.3 4.6 - 13.2 K/uL RBC 5.90 (H) 4.20 - 5.30 M/uL  
 HGB 16.7 (H) 12.0 - 16.0 g/dL HCT 50.8 (H) 35.0 - 45.0 % MCV 86.1 74.0 - 97.0 FL  
 MCH 28.3 24.0 - 34.0 PG  
 MCHC 32.9 31.0 - 37.0 g/dL  
 RDW 15.7 (H) 11.6 - 14.5 % PLATELET 535 821 - 436 K/uL MPV 9.7 9.2 - 11.8 FL  
 NEUTROPHILS 50 42 - 75 % BAND NEUTROPHILS 22 (H) 0 - 5 % LYMPHOCYTES 21 20 - 51 % MONOCYTES 0 (L) 2 - 9 % EOSINOPHILS 0 0 - 5 % BASOPHILS 0 0 - 3 % METAMYELOCYTES 7 (H) 0 %  
 ABS. NEUTROPHILS 5.3 1.8 - 8.0 K/UL  
 ABS. LYMPHOCYTES 1.5 0.8 - 3.5 K/UL  
 ABS. MONOCYTES 0.0 0 - 1.0 K/UL  
 ABS. EOSINOPHILS 0.0 0.0 - 0.4 K/UL  
 ABS. BASOPHILS 0.0 0.0 - 0.06 K/UL  
 DF MANUAL PLATELET COMMENTS ADEQUATE PLATELETS    
 RBC COMMENTS ANISOCYTOSIS 
1+ NT-PRO BNP Collection Time: 02/29/20  9:16 AM  
Result Value Ref Range NT pro- 0 - 900 PG/ML  
CARDIAC PANEL,(CK, CKMB & TROPONIN) Collection Time: 02/29/20  9:16 AM  
Result Value Ref Range CK 58 26 - 192 U/L  
 CK - MB 2.0 <3.6 ng/ml CK-MB Index 3.4 0.0 - 4.0 % Troponin-I, QT 0.04 0.0 - 0.045 NG/ML  
POC G3 Collection Time: 02/29/20 10:51 AM  
Result Value Ref Range Device: VENT    
 FIO2 (POC) 100 % pH (POC) 7.169 (LL) 7.35 - 7.45    
 pCO2 (POC) 71.2 (H) 35.0 - 45.0 MMHG  
 pO2 (POC) 231 (H) 80 - 100 MMHG  
 HCO3 (POC) 25.9 22 - 26 MMOL/L  
 sO2 (POC) 100 (H) 92 - 97 % Base deficit (POC) 4 mmol/L Mode ASSIST CONTROL Tidal volume 420 ml Set Rate 14 bpm  
 PEEP/CPAP (POC) 8 cmH2O Devins test (POC) YES Inspiratory Time 1 sec Total resp. rate 14 Site LEFT RADIAL Specimen type (POC) ARTERIAL Performed by Clementina Fernandez Volume control plus YES    
 
 
images and reports reviewed Assessment:  
Kaushal Daniels is a 62 y.o. female is presenting with abdominal pain and a picture of perforated viscus on KUB (CT scan still pending) and septic shock.  Of course there is a possibility that the free air is from her COPD, but it is extremely unlikely and given the fact that she went into shock that quickly,and the ER team exam of peritoneal signs, and her significant abdominal distention, she needs to go to the OR STAT. Plan:  
 
Admitted already to medicine/ICU team. Appreciate that given her multiple medical conditions OR for laparotomy, possible bowel resection, possible ostomy IVF Antibiotics Pressors Will keep checking her CT scan results, but given the facts I have, she needs to go to surgery. Please call me if you have any questions (cell phone: [phone]) Signed By: Baron Damon MD   
 February 29, 2020

## 2020-02-29 NOTE — PROGRESS NOTES
Spoke to radiologist. Stated that there is pneumoperitoneum on the CT scan. Will proceed with laparotomy as planned.

## 2020-02-29 NOTE — PROGRESS NOTES
I looked at the CT scan and I could not see free air. I asked the ER to make sure that the CT scan is read since I may have missed it. Please see my note from before.

## 2020-02-29 NOTE — ED TRIAGE NOTES
Arrived from home by EMS for c/o SOB and abd pain 10/10. Had back surgery 4 weeks ago. Last BM 2 weeks ago. abd significantly distended. EMS gave 324 ASA en route.  Neb treatment started by EMS PTA

## 2020-02-29 NOTE — ANESTHESIA PREPROCEDURE EVALUATION
Relevant Problems No relevant active problems Anesthetic History No history of anesthetic complications Review of Systems / Medical History Patient summary reviewed, nursing notes reviewed and pertinent labs reviewed Pulmonary Within defined limits COPD: moderate Comments: sarcoidosis Neuro/Psych Within defined limits Psychiatric history Cardiovascular Within defined limits Exercise tolerance: >4 METS 
  
GI/Hepatic/Renal 
Within defined limits Liver disease Endo/Other Within defined limits Diabetes: type 1 Hypothyroidism Morbid obesity and arthritis Other Findings Comments: SARCOIDOSIS 
AIR IN THE ABDOMEN 
EMERGENCY EXPL. LAPAROTOMY PROBLEM LIST: 
Past Medical History: 
Diagnosis Date  Abnormally low high density lipoprotein (HDL) cholesterol with hypertriglyceridemia   
  Lipid profile (11/6/2016) showed , , HDL 38, LDL 37  Acute blood loss as cause of postoperative anemia 12/12/2019  Anxiety   
 Bipolar affective disorder ([facility] 12/5/2012  Cellulitis of right forearm 05/04/2017  Chronic back pain   
 Chronic obstructive pulmonary disease (COPD) (HCC)   
  SOB, on [place] O2 Recent admission with psychosis  Chronic respiratory failure with hypoxia (HCC)   
  On home oxygen inhalation at 3 LPM via NC 
 Contusion of left elbow 5/4/2017  Depression   
 Diabetic neuropathy associated with type 2 diabetes mellitus (HCC)   
 Diastolic dysfunction without heart failure   
  Stable on diuretics  Falls frequently   
 Gastroesophageal reflux disease with hiatal hernia   
 Generalized osteoarthritis of multiple sites   
 Gout   
  On Allopurinol  History of acute renal failure 5/31/2013  History of back injury   
  jumped out of second story window  History of fracture due to fall 12/11/2019 
 History of hepatitis C   
  treated  History of penicillin allergy   
  History of vitamin D deficiency 5/10/2017  Hypertensive heart disease without heart failure   
  Better controlled  Hyperuricemia 5/26/2017  Hypothyroidism   
 Hypoxemia requiring supplemental oxygen 12/29/2014  Intravenous drug user 5/2/2017  Long term current use of aspirin   
 Memory difficulty   
 Menopause   
 Mixed connective tissue disease ([facility] [address] [address] 5/10/2017  Obesity, Class I, BMI 30-34. 9   
 Olecranon bursitis of right elbow 5/4/2017  Opioid dependence ([facility]   
  On Methadone  Recurrent genital herpes 5/31/2013  Right Achilles tendinitis 5/2/2017  Sarcoidosis   
 Sickle cell trait ([facility]   
 Tobacco use disorder   
 Type 2 diabetes mellitus with diabetic neuropathy, with long-term current use of insulin ([facility])   
  HbA1c (12/11/2019) = 7.1  Urge urinary incontinence 5/10/2017  Venous insufficiency Physical Exam 
 
Airway Mallampati: II 
TM Distance: 4 - 6 cm Neck ROM: normal range of motion Mouth opening: Normal 
 
 Cardiovascular Regular rate and rhythm,  S1 and S2 normal,  no murmur, click, rub, or gallop Rhythm: regular Rate: normal 
 
 
 
 Dental 
 
Dentition: Full upper dentures Comments: 2 native lower teeth Pulmonary Breath sounds clear to auscultation Abdominal 
GI exam deferred Other Findings Anesthetic Plan ASA: 4, emergent Anesthesia type: general 
 
Monitoring Plan: CVP Post procedure ventilation Induction: Intravenous Anesthetic plan and risks discussed with: Patient

## 2020-02-29 NOTE — ED PROVIDER NOTES
Central Line 
Date/Time: 2/29/2020 12:03 PM 
Performed by: Ean Cain MD 
Authorized by: Dina Porter MD  
 
Consent:  
  Consent obtained:  Emergent situation Universal protocol:  
  Test results available and properly labeled: yes Imaging studies available: yes Site/side marked: yes Immediately prior to procedure, a time out was called: yes Patient identity confirmed:  Arm band Pre-procedure details:  
  Hand hygiene: Hand hygiene performed prior to insertion Sterile barrier technique: All elements of maximal sterile technique followed Skin preparation:  ChloraPrep Skin preparation agent: Skin preparation agent completely dried prior to procedure Sedation:  
  Sedation type: Moderate (conscious) sedation Anesthesia (see MAR for exact dosages): Anesthesia method:  None Procedure details: Location:  R internal jugular Patient position:  Trendelenburg Procedural supplies:  Triple lumen Catheter size:  12 Fr Landmarks identified: yes Ultrasound guidance: yes Sterile ultrasound techniques: Sterile gel and sterile probe covers were used Number of attempts:  1 Successful placement: yes Post-procedure details: Post-procedure:  Dressing applied and line sutured Assessment:  Blood return through all ports, no pneumothorax on x-ray, placement verified by x-ray and free fluid flow Patient tolerance of procedure: Tolerated well, no immediate complications Jg Sandy MD

## 2020-02-29 NOTE — ANESTHESIA PROCEDURE NOTES
Arterial Line Placement Start time: 2/29/2020 2:05 PM 
End time: 2/29/2020 2:40 PM 
Performed by: Shravan Mckenzie CRNA Authorized by: Shravan Mckenzie CRNA Pre-Procedure Indications:  Arterial pressure monitoring and blood sampling Preanesthetic Checklist: patient identified, site marked, patient being monitored, timeout performed and patient being monitored Timeout Time: 14:05 Preanesthetic Checklist comment:  Emergent case, intraop Procedure:  
Prep:  Chlorhexidine Seldinger Technique?: Yes Orientation:  Left Location:  Radial artery Catheter size:  20 G Number of attempts:  2 Assessment:  
Post-procedure:  Line secured and sterile dressing applied

## 2020-02-29 NOTE — BRIEF OP NOTE
BRIEF OPERATIVE NOTE Date of Procedure: 2/29/2020 Preoperative Diagnosis: dx 
Postoperative Diagnosis: * No post-op diagnosis entered * Procedure(s): LAPAROTOMY EXPLORATORY. Drainage of intraabdominal stool and collections. Sigmoid colectomy. Open abdomen. Surgeon(s) and Role: Maral Aguirre MD - Primary Surgical Staff: 
Circ-1: Lafe Bloch, RN Scrub Tech-1: Yen Morrow Surg Asst-1: Wei Stands Event Time In Time Out Incision Start [id number] Incision Close Anesthesia: General  
Estimated Blood Loss: Miniaml. Specimens:  
ID Type Source Tests Collected by Time Destination 1 : SIGMOID COLON  Preservative Colon  José Miguel Carcamo MD 2/29/2020 1404 Pathology Findings: Perforated sigmoid colectomy (Probably for days) with stool all over the abdomen. Complications: None. Implants: * No implants in log *

## 2020-02-29 NOTE — ED NOTES
Xray at bedside. Family at bedside. Family aware of plan for surgery. Consent signed and scanned into chart. Renae placed. Urine specimen obtained with placement

## 2020-02-29 NOTE — ROUTINE PROCESS
TRANSFER - OUT REPORT: 
 
Verbal report given to Diane ALVARADO on Heather Sifuentes  being transferred to ICU rm [id number] for routine progression of care Report consisted of patients Situation, Background, Assessment and  
Recommendations(SBAR). Information from the following report(s) SBAR, Kardex, OR Summary, Intake/Output, Recent Results and Procedure Verification was reviewed with the receiving nurse. Lines:  
Triple Lumen IJ Central Line 02/29/20 Right Internal jugular (Active) Site Assessment Clean, dry, & intact 2/29/2020 12:14 PM  
Infiltration Assessment 0 2/29/2020 12:14 PM  
Dressing Status Clean, dry, & intact 2/29/2020 12:14 PM  
Dressing Type Transparent 2/29/2020 12:14 PM  
   
Peripheral IV 02/29/20 Right;Upper Arm (Active) Site Assessment Clean, dry, & intact 2/29/2020  9:14 AM  
Phlebitis Assessment 0 2/29/2020  9:14 AM  
Infiltration Assessment 0 2/29/2020  9:14 AM  
Dressing Status Clean, dry, & intact 2/29/2020  9:14 AM  
Dressing Type Transparent 2/29/2020  9:14 AM  
   
Arterial Line 02/29/20 Left Radial artery (Active) Opportunity for questions and clarification was provided. Patient transported with: 
 Monitor O2 @ 10 liters Registered Nurse

## 2020-03-01 NOTE — ROUTINE PROCESS
Bedside and Verbal shift change report given to [address] [place] [address] (oncoming nurse) by Nataly Schwarz RN 
 (offgoing nurse). Report given with SBAR, Kardex, Intake/Output, MAR, Accordion and Recent Results.

## 2020-03-01 NOTE — PROGRESS NOTES
Spoke with primary RN David Hansen regarding Q4 Lactic draw. RN verbalized understanding and to draw. Will continue to monitor. Thank you, MAURICE Moran Fish Sepsis [facility] Call Back # [phone]

## 2020-03-01 NOTE — PROGRESS NOTES
TRANSFER - IN REPORT: 
 
Verbal report received from Gilda Herman RN(name) on Stephen RODRIGUEZ Roots  being received from ICU(unit) for ordered procedure Report consisted of patients Situation, Background, Assessment and  
Recommendations(SBAR). Information from the following report(s) SBAR was reviewed with the receiving nurse. Opportunity for questions and clarification was provided. Assessment completed upon patients arrival to unit and care assumed.

## 2020-03-01 NOTE — OP NOTES
[address] [place] [address]  
OPERATIVE REPORT Name:  Clovia Bamberger 
MR#:   [id number] :  1955 ACCOUNT #:  [de-identified] DATE OF SERVICE:  2020 PREOPERATIVE DIAGNOSIS:  Perforated viscus with septic shock. POSTOPERATIVE DIAGNOSIS: Perforated sigmoid colon with gross contamination in the abdomen with stool causing septic shock PROCEDURES PERFORMED: 
1. Exploratory laparotomy. 2.  Drainage of multiple collection of stool and abscess collections. 3.  Sigmoid colectomy. 4.  Open abdomen. SURGEON:  Isaac Lamas MD 
 
ASSISTANT:  Ramona Hdz ANESTHESIA:  General. 
 
COMPLICATIONS:  None. SPECIMENS REMOVED:  Sigmoid colon. IMPLANTS:  None. ESTIMATED BLOOD LOSS:  Minimal. 
 
INDICATION FOR THE SURGERY:  The patient is a 60-year-old female patient who came to the emergency room with, it seems, history of abdominal pain and chest pain of a few days' duration and she developed septic shock and she was intubated in the emergency room and a KUB was performed that showed free air as well as a CT scan was performed, but was not read at the time they called me. I was called because the patient had a free air for which I called the operating room and scheduled her for a laparotomy. When I saw the patient in the emergency room, she was already intubated, so I could not take a history from her and she did not have a family at the beginning; however, later on the daughter came and she stated that the patient has been having constipation for days as well as abdominal pain and chest pain and the CT scan read came back as a free air, so the patient was taken to Surgery. The patient was clearly in septic shock. She was on pressors with a very low blood pressure despite being on pressors. She got already the antibiotic in the emergency room and she was taken for surgery and she was already admitted to the ICU service. DETAILS OF PROCEDURE:  The patient was brought to the operating room. Anesthesia was induced. The patient was very hypotensive. The gave her a lot of fluids and pressors. They tried multiple times to place an A-line before the procedure and during the procedure and by the end of the procedure, they were not able to place an A-line because probably of hypotension and vasoconstriction. So at this point scrubbing and draping of the abdomen was done, a time-out was performed. A midline laparotomy was performed. As soon as we entered the abdomen, there was air as well as stool in the abdomen. The small bowel and colon were tainted with stool probably secondary to the perforation that has happened for days. At this point, I was able to identify the perforation in the sigmoid colon with the sigmoid colon extremely distended with large amount of stool impacted in it. I am not sure if this is the reason for the perforation, but most likely it is. At this point, the patient was still very hypotensive, put on pressors and clearly there was gross contamination in the abdomen. We irrigated and cleaned as much as possible of the contamination from the sigmoid colon perforation. The sigmoid colon and the descending colon was extremely distended and elongated secondary to the stool impaction. I did do a minimal mobilization of the sigmoid and descending colon and then fired a ERIC blue load on the proximal end. at this point the perforation was localized in the lower sigmoid upper rectal area but there was significant stool impaction in the colon but I was not able to place a ERIC to fire it. So at this point I opened the staple line of the sigmoid colon where I fired the proximal part and I drained a large amount of stool just to decompress the sigmoid colon slightly so I will be able to place a ERIC below the perforation at the rectosigmoid area .   At that point I was able to place a ERIC and I was able to fire just below the perforation. At that point the specimen which is the sigmoid colon with the area of perforation,  was sent for permanent pathology. At that point I evaluated the proximal and distal end of my staple lines were I fired and I looked at the rectum as well as the descending colon and they both looked the same as the remaining part of the colon with no evidence of ischemia . However as stated before the whole colon and small bowel were painted with a stool-like color because of the perforation. The anesthesia team are struggling to keep up with the patient blood pressure and at that point she was on multiple pressors and they try to put an arterial line but were not able to because of her severe hypotension. Giving these facts I decided to keep the patient's abdomen open and to come back in 24 to 48 hours for reevaluation because she is a severe septic shock currently and I am not sure I will be able to do an ostomy. I am not even sure that the bowel is continued to be viable later on and I would like to have another look for further evaluation . At this point, after multiple irrigation and taking all the gross contamination as much as possible, I placed the omentum on top of the bowel and then I put a 10 x 10 nonadhesive drape inside the abdomen after making a couple of holes in it and then I put a Kerlix and then a flat MOHINDER drain and then an [place] and then we connected this to the suction to act like a wound VAC. At this point, the patient was still intubated and the anesthesia team were still trying to insert an arterial line before taking the patient to the Intensive Care Unit. Jean Paul Cross MD 
 
 
[id number] 
D:  02/29/2020 14:21 T:  02/29/2020 21:14 
JOB #:  [id number]

## 2020-03-01 NOTE — PROGRESS NOTES
Problem: Ventilator Management Goal: *Adequate oxygenation and ventilation Outcome: Progressing Towards Goal 
Goal: *Patient maintains clear airway/free of aspiration Outcome: Progressing Towards Goal 
Goal: *Absence of infection signs and symptoms Outcome: Progressing Towards Goal 
Goal: *Normal spontaneous ventilation Outcome: Progressing Towards Goal 
  
Problem: Patient Education: Go to Patient Education Activity Goal: Patient/Family Education Outcome: Progressing Towards Goal 
  
Problem: Non-Violent Restraints Goal: *Removal from restraints as soon as assessed to be safe Outcome: Progressing Towards Goal 
Goal: *No harm/injury to patient while restraints in use Outcome: Progressing Towards Goal 
Goal: *Patient's dignity will be maintained Outcome: Progressing Towards Goal 
Goal: *Patient Specific Goal (EDIT GOAL, INSERT TEXT) Outcome: Progressing Towards Goal 
Goal: Non-violent Restaints:Standard Interventions Outcome: Progressing Towards Goal 
Goal: Non-violent Restraints:Patient Interventions Outcome: Progressing Towards Goal 
Goal: Patient/Family Education Outcome: Progressing Towards Goal 
  
Problem: Diabetes Self-Management Goal: *Disease process and treatment process Description Define diabetes and identify own type of diabetes; list 3 options for treating diabetes. Outcome: Progressing Towards Goal 
Goal: *Incorporating nutritional management into lifestyle Description Describe effect of type, amount and timing of food on blood glucose; list 3 methods for planning meals. Outcome: Progressing Towards Goal 
Goal: *Incorporating physical activity into lifestyle Description State effect of exercise on blood glucose levels. Outcome: Progressing Towards Goal 
Goal: *Developing strategies to promote health/change behavior Description Define the ABC's of diabetes; identify appropriate screenings, schedule and personal plan for screenings.  
Outcome: Progressing Towards Goal 
 Goal: *Using medications safely Description State effect of diabetes medications on diabetes; name diabetes medication taking, action and side effects. Outcome: Progressing Towards Goal 
Goal: *Monitoring blood glucose, interpreting and using results Description Identify recommended blood glucose targets  and personal targets. Outcome: Progressing Towards Goal 
Goal: *Prevention, detection, treatment of acute complications Description List symptoms of hyper- and hypoglycemia; describe how to treat low blood sugar and actions for lowering  high blood glucose level. Outcome: Progressing Towards Goal 
Goal: *Prevention, detection and treatment of chronic complications Description Define the natural course of diabetes and describe the relationship of blood glucose levels to long term complications of diabetes. Outcome: Progressing Towards Goal 
Goal: *Developing strategies to address psychosocial issues Description Describe feelings about living with diabetes; identify support needed and support network Outcome: Progressing Towards Goal 
Goal: *Insulin pump training Outcome: Progressing Towards Goal 
Goal: *Sick day guidelines Outcome: Progressing Towards Goal 
Goal: *Patient Specific Goal (EDIT GOAL, INSERT TEXT) Outcome: Progressing Towards Goal 
  
Problem: Patient Education: Go to Patient Education Activity Goal: Patient/Family Education Outcome: Progressing Towards Goal 
  
Problem: Falls - Risk of 
Goal: *Absence of Falls Description Document Nancy Barron Fall Risk and appropriate interventions in the flowsheet. Outcome: Progressing Towards Goal 
Note: Fall Risk Interventions: 
Mobility Interventions: Communicate number of staff needed for ambulation/transfer, Strengthening exercises (ROM-active/passive) Mentation Interventions: Adequate sleep, hydration, pain control, Bed/chair exit alarm, Door open when patient unattended, Room close to nurse's station, Toileting rounds, Update white board Medication Interventions: Evaluate medications/consider consulting pharmacy Elimination Interventions: Toileting schedule/hourly rounds Problem: Patient Education: Go to Patient Education Activity Goal: Patient/Family Education Outcome: Progressing Towards Goal 
  
Problem: Pressure Injury - Risk of 
Goal: *Prevention of pressure injury Description Document Florian Scale and appropriate interventions in the flowsheet. Outcome: Progressing Towards Goal 
Note: Pressure Injury Interventions: 
Sensory Interventions: Assess changes in LOC, Avoid rigorous massage over bony prominences, Check visual cues for pain, Float heels, Keep linens dry and wrinkle-free, Minimize linen layers, Monitor skin under medical devices, Turn and reposition approx. every two hours (pillows and wedges if needed) Activity Interventions: Pressure redistribution bed/mattress(bed type) Mobility Interventions: Pressure redistribution bed/mattress (bed type), Turn and reposition approx. every two hours(pillow and wedges) Nutrition Interventions: Discuss nutritional consult with provider Friction and Shear Interventions: Apply protective barrier, creams and emollients, HOB 30 degrees or less, Minimize layers Problem: Patient Education: Go to Patient Education Activity Goal: Patient/Family Education Outcome: Progressing Towards Goal

## 2020-03-01 NOTE — PROGRESS NOTES
Patient seen and examined she is intubated in critical condition. With a picture of septic shock on 3 pressors and still hypotensive. She has high-grade fever and tachycardia. She has leukocytosis. She has multiorgan failure. Surprisingly she is making very good urine output and her creatinine has actually slightly improved. She has an open abdomen Impression: 
Septic shock with multiorgan failure secondary to a perforated sigmoid colon most likely secondary to stool impaction. However cannot rule out malignancy or diverticulitis. The patient condition is grim and the prognosis is poor. I explained this to the daughter yesterday in details. Plan:  
Appreciate ICU admission and management Continue supportive therapy IV antibiotics IV fluids I may take the patient back to surgery today for reexploration and attempt to do an ostomy and attempt to close the abdomen.

## 2020-03-01 NOTE — ANESTHESIA PROCEDURE NOTES
Peripheral Block Start time: 3/1/2020 6:10 PM 
End time: 3/1/2020 6:15 PM 
Performed by: Micaela Cardoza CRNA Authorized by: Kassie Qureshi MD  
 
 
Pre-procedure: Indications: at surgeon's request and post-op pain management Preanesthetic Checklist: patient identified, risks and benefits discussed, site marked, timeout performed, anesthesia consent given and patient being monitored Timeout Time: 18:10 Block Type:  
Block Type:  TAP Laterality:  Left and right Monitoring:  Standard ASA monitoring, responsive to questions, oxygen, continuous pulse ox, frequent vital sign checks and heart rate Injection Technique:  Single shot Procedures: ultrasound guided Patient Position: supine Prep: DuraPrep Location:  Abdominal 
Needle Type:  Ultraplex Needle Gauge:  20 G Needle Localization:  Anatomical landmarks, infiltration and ultrasound guidance Assessment: 
Number of attempts:  1 Injection Assessment:  Incremental injection every 5 mL, ultrasound image on chart, local visualized surrounding nerve on ultrasound, negative aspiration for blood and no intravascular symptoms Patient tolerance:  Patient tolerated the procedure well with no immediate complications

## 2020-03-01 NOTE — PROGRESS NOTES
Patient is not available to be assessed at this time. [address] [place] [address] Care  
[phone]

## 2020-03-01 NOTE — H&P
[facility] Pulmonary, Critical Care, and Sleep Medicine Name: Judit Duggan MRN: [id number] : 1955 Hospital: [facility] Date: 3/1/2020 Critical Care History and Physical 
 
 
IMPRESSION:  
· Acute on Chronic Respiratory Failure - Emergently intubated in ED for airway protection, 2/2 below. Now post-op and remains on mechanical ventilation. Chronically on 3L NC at home · Acute Sigmoid Colon Perforation - s/p Emergent Ex-Lap with drainage of intraabdominal stool and collections; sigmoid colectomy with Dr. Manuel Mcnally on 20. Pt left with open closure with MOHINDER drain to suction. Plan to go back to OR in the AM (20) for washout and abdominal closure. · Septic Shock with MSOF - 2/2 above. Possible secondary infection with dirty UA. Currently on Levophed at 25mcg/min s/p OR. Required  Wil-Synephrine intraoperatively as well. · Peritonitis - 2/2 above - Sigmoid Colon Perforation with large intraabdominal stool burden. · Bandemia - Initially with 22% bands. Anticipate L shift and leukocytosis within next 24-48 hrs. · Lactic Acidosis - Initially 6.7 in ED. 2/2 above. · Metabolic Acidosis - 2/2 above · ELIF - Likely pre-renal, 2/2 above. · UTI - 2+ bacteria in UA with WBCs and RBCs. (-) Nitrates. · Lytes - Hypocalcemia, hypokalemia, hypomagnesemia post-op · T2DM - Lantus 20u qhs at home. · Paraparesis- in wheelchair: Recent Hx of Spine Thoracic Laminectomy T6-T11 with Fusion (20) with Dr. Ivan Garner 2/2 Acute compression fx of T9-T10 and spinal cord compression at T9 and T10 and spinal cord edema with subsequent paraparesis. · Hx of Sarcoidosis - Chronic steroid use · Hx of COPD - Follows with Dr. Miguel Haji · Hx of Cocaine and Heroin Abuse - Noted to be Methadone at home. Patient Active Problem List  
Diagnosis Code  Diabetic neuropathy associated with type 2 diabetes mellitus (HCC) E11.40  Venous insufficiency I87.2  Obesity, Class I, BMI 30-34.9 E66.9  Chronic back pain M54.9, G89.29  
 Generalized osteoarthritis of multiple sites M15.9  Bipolar affective disorder ([facility] F31.9  Abnormally low high density lipoprotein (HDL) cholesterol with hypertriglyceridemia E78.6, E78.1  Diastolic dysfunction without heart failure I51.89  Chronic obstructive pulmonary disease (COPD) ([facility] J44.9  Hypertensive heart disease without heart failure I11.9  Depression F32.9  History of back injury Z87.828  Type 2 diabetes mellitus with diabetic neuropathy, with long-term current use of insulin ([facility]) E11.40, Z79.4  Anxiety F41.9  Wears glasses Z97.3  History of hepatitis C Z86.19  
 Sickle cell trait ([facility]) D57.3  History of acute renal failure Z87.448  Hypothyroidism E03.9  Recurrent genital herpes A60.00  Sarcoidosis D86.9  Abscess of right arm L02.413  Hypoxemia requiring supplemental oxygen R09.02, Z99.81  
 Abnormal nuclear stress test R94.39  
 Cellulitis and abscess of hand L03.119, L02.519  
 Cellulitis and abscess of foot L03.119, L02.619  
 Cellulitis of arm, right L03. 113  
 Olecranon bursitis of right elbow M70.21  
 Contusion of left elbow S50. 02XA  Intravenous drug user F19.90  Right Achilles tendinitis M76.61  
 History of penicillin allergy Z88.0  Falls frequently R29.6  Gastroesophageal reflux disease with hiatal hernia K21.9, K44.9  Memory difficulty R41.3  Mixed connective tissue disease ([facility] M35.1  Impaired mobility and ADLs Z74.09  
 Hyperuricemia E79.0  History of vitamin D deficiency Z86.39  
 Urge urinary incontinence N39.41  Spinal cord compression ([facility]) G95.20  Compression fracture of body of thoracic vertebra ([facility]) S22.000A  Status post laminectomy with spinal fusion Z98.1  Acute blood loss as cause of postoperative anemia D62  
 History of fracture due to fall Z87.81  
  Chronic respiratory failure with hypoxia ([facility]) J96.11  
 Cellulitis of right forearm L03. 113  
 Gout M10.9  Menopause Z78.0  Long term current use of aspirin Z79.82  
 Opioid dependence ([facility]) F11.20  Tobacco use disorder F17.200  Sepsis ([facility] A41.9  Perforated viscus R19.8  Septic shock ([facility]) A41.9, R65.21  
 
  
RECOMMENDATIONS:  
· Resp: Titrate FiO2 O2 for SpO2 >94%; optimize bronchial hygiene. Duo-nebs q4 and Albuterol nebs q6 PRN. Pulmicort nebs BID. Daily CXR and ABG while intubated. Avoid high PEEP in pt with open abdomen. · I/D: Sepsis bundle per hospital protocol, f/u BxCx/UC, Trend LA q4hrs until normal. Con't Flagyl, Cefepime and Vanc. Add Eraxis for antifungal prophylaxis as pt is at high risk for fungemia with bowel perforation. Deescalate ABX once Cx's finalize. Consider ID consult pending clinical course. · Hem/Onc: Daily CBC. Closely monitor for s/s of active bleeding. Check CBC and PT/INR post-op. · CVS: HD currently stable while on Levophed 22mcg/min. Continue volume resuscitation with Normosol boluses PRN. Give 1L bolus now and monitor. Anticipate pt will third space overnight. Utilize Albumin PRN secondary option. May need to start shock dose Vasopressin, but will attempt to avoid given risk of bowel ischemia in large abdominal surgery. Would add Wil-Synephrine first if needed. Monitor CVP, Actively titrate vasopressors aim MAP >65mmHg, Trend CE's. · Metabolic: Daily CMP, Mag and Phos; monitor e-lytes; replace PRN. Check post-op CMP, Mag, Phos and ionized Ca++. · Renal: Trend Renal indices; Batool. · Endocrine: POC Glucose q6; SSI. Anticipate pt may require Insulin gtt while on stress dose steroids as she is on Lantus 20u at home. Will start with SSI and adjust from there. · GI: SUP, Trend LFTs, Zofran PRN for N/V. NPO. General Surgery following. Plan to return to OR in AM for abdominal washout and closure. Monitor output from MOHINDER drain. · Musc/Skin: No acute issues. · Neuro: Fentanyl and Versed gtt and PRN for sedation. Anticipate pt will have high opoid tolerance - noted to be on Methadone (unsure of current dose) at home and hx of IVDA. May require Dilaudid PCA. RASS 0 to -1. Bilateral soft wrist restraints for pt safety while intubated. · Fluids: Normosol at 75cc/hr. · Code Status: Full Best Practices/Safety Bundles: 
· Sepsis Bundle per Hospital Protocol · Glycemic control; avoid Hypoglycemia · IHI ICU Bundles: 
·  Central Line Bundle Followed , Rousseau Bundle Followed and Vent Bundle Followed, Vent Day 1   
· Mech Vent patients/ Pulmonary pts:  
· VAP bundle, Aim to keep peak plateau pressure 17-57MY H2O 
· Aspiration Precautions - HOB >30' · Daily sedation holiday as indicated · SBT as tolerated/appropriate · Stress ulcer prophylaxis: Protonix · DVT prophylaxis: SCD's - Avoid chemical prophylaxis as pt will be returning to OR in AM. · Need for Lines, rousseau assessed. · Restraints need. Subjective/History: This patient has been seen and evaluated at the request of Dr. Chris Hagan for Septic Shock 2/2 Perforated Sigmoid Colon with large Stool burden spillage and subsequently Peritonitis. 03/01/20 Patient is a 62 y.o. female with PMH of COPD (on 3LPM NC home O2), Sarcoidosis, HTN, T2DM,  Hep C, recent Spine Thoracic Laminectomy T6-T11 with Fusion (12/11/20) 2/2 compression fx of T9-T10 and spinal cord compression at T9 and T10 and spinal cord edema with subsequent paraparesis, who presented to [facility] ED on 02/29/20 c/o acute abdominal pain, SOB with associated constipation x4-5 days PTA, likely 2/2 aforementioned spinal surgery on 12/11/19. Per chart, pt's last BM was 2 weeks ago (~02/15/20). Pt was initially placed on BiPAP in ED for respiratory distress. CT Chest/Abd/Pelvis showed intraperitoneal free air consistent with perforated viscus. ED w/o otherwise significant for lactic acidosis. pt was ultimately intubated in ED for worsening overall status and respiratory failure. Sepsis alert triggered and protocol initiated. Patient received Cefepime in the ED, along with 2 L NS bolus. BxCx sent. CVL placed in ED - R IJ. Pt was started on stress dose steroids. General Surgery was immediately consulted and pt was taken to OR for emergent Ex lap. Patient presented to ICU on mechanical ventilation, s/p ex lap where patient was found to have sigmoid colon perforation, 2/2 constipation with large amount of stool burden spilling into the peritoneum per Dr. Flores Choi. Pt received Albumin 25% bolus and 5% bolus, Wil-Synephrine and Levophed intraoperatively. Patient arrived to ICU on Levophed at 20mcg/min. Vida placed in OR. Pt currently stable on Levophed and will wake to command. Pt was left with an open abdomen with dressing closure with MOHINDER drain set to suction. Dr. Flores Choi, plan is to take patient back to OR in a.m. for abdominal washout and closure. The patient is critically ill and can not provide additional history due to Ventilated. 03/01/20 · Patient on 3 IV pressors this morning: Nor-Epi, Vasopressin and Phenylephrine this afternoon, off Vasopressin · Urine growing GNRs · Blood Cultures x 2 with GNRs at 15 hours of growth in both anaerobic bottles · Serous output from MOHINDER drains · Good UOP · Remains intubated · Discussed case with ID staff- Cefepime and Flagyl changed to Zosyn (Patient with report of Hives many years ago). Patient given dose of Zosyn and appears to be tolerating well at this time. Continuing with Eraxis and Vanco 
· I spoke with Gen Surg: Dr. Flores Choi today- [id number] to start DVT prophylaxis. He does plan to take patient back to OR later today for washout and possible Colostomy- pending what he sees intraoperative. · Patient with persistent fever to 103. NPO.   Concerned that [place] will not absorb well in this clinical setting- given one dose of IV tylenol with improvement in fever and heart rate · Currently on cooling blanket · Patient's daughter also at bedside and medical surrogate for patient. Past Medical History:  
Diagnosis Date  Abnormally low high density lipoprotein (HDL) cholesterol with hypertriglyceridemia Lipid profile (11/6/2016) showed , , HDL 38, LDL 37  Acute blood loss as cause of postoperative anemia 12/12/2019  Anxiety  Bipolar affective disorder ([address] 12/5/2012  Cellulitis of right forearm 05/04/2017  Chronic back pain  Chronic obstructive pulmonary disease (COPD) ([address] SOB, on [place] O2 Recent admission with psychosis  Chronic respiratory failure with hypoxia ([address] On home oxygen inhalation at 3 LPM via NC  
 Contusion of left elbow 5/4/2017  Depression  Diabetic neuropathy associated with type 2 diabetes mellitus ([address]  Diastolic dysfunction without heart failure Stable on diuretics  Falls frequently  Gastroesophageal reflux disease with hiatal hernia  Generalized osteoarthritis of multiple sites  Gout On Allopurinol  History of acute renal failure 5/31/2013  History of back injury   
 jumped out of second story window  History of fracture due to fall 12/11/2019  
 History of hepatitis C   
 treated  History of penicillin allergy  History of vitamin D deficiency 5/10/2017  Hypertensive heart disease without heart failure Better controlled  Hyperuricemia 5/26/2017  Hypothyroidism  Hypoxemia requiring supplemental oxygen 12/29/2014  Intravenous drug user 5/2/2017  Long term current use of aspirin  Memory difficulty  Menopause  Mixed connective tissue disease ([address] 5/10/2017  Obesity, Class I, BMI 30-34.9  Olecranon bursitis of right elbow 5/4/2017  Opioid dependence ([address] On Methadone  Recurrent genital herpes 5/31/2013  Right Achilles tendinitis 5/2/2017  Sarcoidosis  Sickle cell trait ([facility]  Tobacco use disorder  Type 2 diabetes mellitus with diabetic neuropathy, with long-term current use of insulin ([facility] HbA1c (2019) = 7.1  Urge urinary incontinence 5/10/2017  Venous insufficiency  Wears glasses Past Surgical History:  
Procedure Laterality Date Toño  HX  SECTION    
 HX CYST REMOVAL Left 1979 Left foot  HX OTHER SURGICAL Left 2017 S/P incision and drainage of the abscess of the left hand and the left foot (2017 - Dr. Rafi Hayes. Candi Bowser)  HX THORACIC LAMINECTOMY  2019 S/P T10, T9, T8 laminectomy; T7, T8, T9, T10, T11, T12 posterior thoracic fusion; segmental spinal instrumentation; K2M [place] type, T7-T8, T11-T12 (2019 - Dr. Sultana Mendez)  HX TUBAL LIGATION Prior to Admission medications Medication Sig Start Date End Date Taking? Authorizing Provider  
roflumilast (DALIRESP) 250 mcg tab Take 250 mcg by mouth daily. 20   Sonia Aguilera MD  
ARIPiprazole (ABILIFY) 10 mg tablet Take 1 Tab by mouth. Provider, Historical  
aspirin-calcium carbonate 81 mg-300 mg calcium(777 mg) tab Take 1 Tab by mouth. Provider, Historical  
divalproex DR (DEPAKOTE) 250 mg tablet Take 1 Tab by mouth. Provider, Historical  
doxycycline (MONODOX) 100 mg capsule  19   Provider, Historical  
gabapentin (NEURONTIN) 300 mg capsule Take 1 Cap by mouth. Provider, Historical  
ipratropium (ATROVENT) 0.02 % soln  20   Provider, Historical  
methylPREDNISolone (MEDROL DOSEPACK) 4 mg tablet  19   Provider, Historical  
oxyCODONE-acetaminophen (PERCOCET) 5-325 mg per tablet Take 1 Tab by mouth. Provider, Historical  
tolterodine (DETROL) 1 mg tablet Take 1 Tab by mouth. Provider, Historical  
traMADol (ULTRAM) 50 mg tablet Take 1 Tab by mouth. Provider, Historical  
traZODone (DESYREL) 100 mg tablet Take 1 Tab by mouth.     Provider, Historical  
 busPIRone (BUSPAR) 15 mg tablet Take 15 mg by mouth two (2) times a day. Indications: repeated episodes of anxiety 2/13/20   Shankar Lundy NP  
OXYGEN-AIR DELIVERY SYSTEMS 3 L/min by Nasal route continuous. [employer] O2    Provider, Historical  
albuterol-ipratropium (DUO-NEB) 2.5 mg-0.5 mg/3 ml nebu 3 mL by Nebulization route every six (6) hours as needed for Wheezing. 1/22/20   Nataly Crockett MD  
busPIRone (BUSPAR) 10 mg tablet Take 10 mg by mouth three (3) times daily. Indications: repeated episodes of anxiety    Provider, Historical  
cholecalciferol (VITAMIN D3) (1000 Units /25 mcg) tablet Take 1,000 Units by mouth two (2) times a day. Provider, Historical  
levothyroxine (SYNTHROID) 100 mcg tablet Take 100 mcg by mouth Daily (before breakfast). Provider, Historical  
insulin glargine (LANTUS U-100 INSULIN) 100 unit/mL injection 20 Units by SubCUTAneous route Daily (before breakfast). Provider, Historical  
famotidine (PEPCID) 20 mg tablet Take 20 mg by mouth nightly. Provider, Historical  
aspirin 81 mg chewable tablet Take 1 Tab by mouth daily (with breakfast). Indications: stroke prevention 1/13/20   Bernard Sandoval MD  
docusate sodium (COLACE) 100 mg capsule Take 1 Cap by mouth daily (after breakfast). Indications: constipation 1/13/20   Bernard Sandoval MD  
predniSONE (DELTASONE) 20 mg tablet Take 20 mg by mouth daily (with breakfast). Indications: sarcoidosis Patient taking differently: Take 30 mg by mouth daily (with breakfast). Indications: sarcoidosis 1/14/20   Bernard Sandoval MD  
ascorbic acid, vitamin C, (VITAMIN C) 250 mg tablet Take 1 Tab by mouth daily (with breakfast). 1/14/20   Bernard Sandoval MD  
calcium citrate 200 mg (950 mg) tablet Take 1 Tab by mouth two (2) times a day.  Indications: post-menopausal osteoporosis prevention 1/14/20   Bernard Sandoval MD  
ferrous sulfate 325 mg (65 mg iron) tablet Take 1 Tab by mouth daily (with breakfast). 1/14/20   David Carson MD  
acetaminophen (TYLENOL) 325 mg tablet Take 2 Tabs by mouth every four (4) hours as needed for Pain. Indications: pain 1/13/20   David Carson MD  
brief disposable (ADULT) misc by Does Not Apply route. 1/9/20   David Carson MD  
methadone (DOLOPHINE) 5 mg tablet Take 4 Tabs by mouth daily. Max Daily Amount: 20 mg. 12/17/19   Darcella Curling, MD  
polyethylene glycol (MIRALAX) 17 gram/dose powder Take 17 g by mouth daily. 1 tablespoon with 8 oz of water daily 11/29/19   Radha Theodore, BLANCA  
umeclidinium-vilanterol [facility] ELLIP) 62.5-25 mcg/actuation inhaler Take 1 Puff by inhalation daily. 11/20/19   Estela Ingram MD  
BD INSULIN SYRINGE ULTRA-FINE 1 mL 31 gauge x 5/16 syrg  11/5/19   Provider, Historical  
rosuvastatin (CRESTOR) 5 mg tablet TAKE ONE TABLET NIGHTLY Patient taking differently: Take 5 mg by mouth nightly. TAKE ONE TABLET NIGHTLY 2/21/19   Equilla Aschoff, MD  
albuterol (PROAIR HFA) 90 mcg/actuation inhaler INHALE 2 PUFFS EVERY 4 HOURS AS NEEDED FOR WHEEZING 1/31/19   Estela Ingram MD  
oxybutynin (DITROPAN) 5 mg tablet Take 5 mg by mouth two (2) times a day. Provider, Historical  
allopurinol (ZYLOPRIM) 100 mg tablet Take 100 mg by mouth daily. Provider, Historical  
insulin lispro (HUMALOG) 100 unit/mL injection To be given 15 min before meals: < 150 - None, 151-200 - 2 u, 201-250 - 4 u, 251-300 - 6 u, 301-350 - 8 u, 351-400 - 10 u, >400 - 12 u Patient taking differently: by SubCUTAneous route once. To be given 15 min before meals: < 150 - None, 151-200 - 2 u, 201-250 - 4 u, 251-300 - 6 u, 301-350 - 8 u, 351-400 - 10 u, >400 - 12 u 5/31/17   David Carson MD  
insulin syringe,safetyneedle 1 mL 30 gauge x 5/16\" syrg As directed 11/8/16   Darcella Curling, MD  
Nebulizer Accessories Kit Use with nebulizer machine 10/31/12   Estela Ingram MD  
sertraline (ZOLOFT) 50 mg tablet Take 50 mg by mouth daily.  Indications: Bipolar Depression    Provider, Historical  
 
 
Current Facility-Administered Medications Medication Dose Route Frequency  [START ON 3/2/2020] vancomycin (VANCOCIN) 1250 mg in  ml infusion  1,250 mg IntraVENous Q18H  piperacillin-tazobactam (ZOSYN) 3.375 g in 0.9% sodium chloride (MBP/ADV) 100 mL MBP  3.375 g IntraVENous Q6H  
 chlorhexidine (PERIDEX) 0.12 % mouthwash 10 mL  10 mL Oral Q12H  electrolyte-r (NORMOSOL R) infusion   IntraVENous CONTINUOUS  
 pantoprazole (PROTONIX) 40 mg in 0.9% sodium chloride 10 mL injection  40 mg IntraVENous Q12H  
 fentaNYL (PF) 900 mcg/30 ml infusion soln  0-200 mcg/hr IntraVENous TITRATE  midazolam in normal saline (VERSED) 2 mg/mL infusion  1-10 mg/hr IntraVENous TITRATE  hydrocortisone Sod Succ (PF) (SOLU-CORTEF) injection 50 mg  50 mg IntraVENous Q6H  
 albuterol-ipratropium (DUO-NEB) 2.5 MG-0.5 MG/3 ML  3 mL Nebulization Q4H RT  
 budesonide (PULMICORT) 500 mcg/2 ml nebulizer suspension  500 mcg Nebulization BID RT  
 insulin lispro (HUMALOG) injection   SubCUTAneous Q6H  
 anidulafungin (ERAXIS) 100 mg in 0.9% sodium chloride 130 mL IVPB  100 mg IntraVENous Q24H  
 NOREPINephrine (LEVOPHED) 32 mg in 5% dextrose 250 mL infusion  0.5-30 mcg/min IntraVENous TITRATE  PHENYLephrine (OLU-SYNEPHRINE) 90 mg in 0.9% sodium chloride 250 mL infusion   mcg/min IntraVENous TITRATE  vasopressin (VASOSTRICT) 20 Units in 0.9% sodium chloride 100 mL infusion  0.04 Units/min IntraVENous CONTINUOUS Allergies Allergen Reactions  Moxifloxacin Rash  Pcn [Penicillins] Swelling  Sulfa (Sulfonamide Antibiotics) Swelling Social History Tobacco Use  Smoking status: Current Every Day Smoker Packs/day: 1.00 Years: 30.00 Pack years: 30.00 Types: Cigarettes Start date: 12/2/1969  Smokeless tobacco: Never Used Substance Use Topics  Alcohol use: No  
  
 
Family History Problem Relation Age of Onset  Seizures Other  Diabetes Mother  Hypertension Mother  Heart Disease Mother  Cancer Mother  Diabetes Father  Stroke Father  Heart Disease Father  Headache Sister  Depression Neg Hx  Suicide Neg Hx  Psychotic Disorder Neg Hx  Substance Abuse Neg Hx  Dementia Neg Hx Review of Systems: 
Review of systems not obtained due to patient factors. Objective:  
Vital Signs:   
Visit Vitals /65 Pulse (!) 119 Temp 99.8 °F (37.7 °C) Resp 23 Ht 5' 4\" (1.626 m) Wt 88 kg (194 lb 0.1 oz) LMP 2012 (Exact Date) SpO2 98% BMI 33.30 kg/m² O2 Device: Ventilator Temp (24hrs), Av.2 °F (38.4 °C), Min:97.8 °F (36.6 °C), Max:103.4 °F (39.7 °C) Intake/Output:  
Last shift:       07 -  190 In: -  
Out: 300 Last 3 shifts: 1901 -  0700 In: 3851 [I.V.:4079] Out: 2600 [Urine:1500; Drains:900] Intake/Output Summary (Last 24 hours) at 3/1/2020 1508 Last data filed at 3/1/2020 0800 Gross per 24 hour Intake 625.96 ml Output 2900 ml Net -2274.04 ml Ventilator Settings: 
Mode Rate Tidal Volume Pressure FiO2 PEEP Assist control, VC+   450 ml    60 % 5 cm H20 Peak airway pressure: 22 cm H2O Minute ventilation: 10.9 l/min ARDS network Guidelines:  
Lung protective strategy and Plateau  Pressure goal < 30 cm H2O goals Oxygenation Goals PaO2 55-80 mm Hg or SaO2 88-95% PH goal 7.30-7.45 VAP bundle: 
Reviewed. Felicity tube to suction at 20-30 cm Hg. Maintain Felicity tube with 5-10ml air every 4 hours Routine oral care every 4 hours Elevation of head > 45 degree Daily sedation holiday and SBT evaluation starting at 6.00am. 
 
Physical Exam:  
 
General:  Intubated/Sedated, NAD Head:  Normocephalic, without obvious abnormality, atraumatic. Eyes:  Conjunctivae/corneas clear. PERRL, Nose: Nares normal. Septum midline. Mucosa normal. No drainage or sinus tenderness. Throat: Lips, mucosa, and tongue normal.  No exudates Neck: Supple, symmetrical, trachea midline, no adenopathy, no carotid bruit and no JVD. Lungs:   Symmetrical chest rise; good AE bilat; course throughout; no wheezes/rales noted. Heart:  RRR, S1, S2 normal, no m/r/g Abdomen:   Soft,  Distended. Non-rigid. +midline surgical incision, dressing c/d/i. MOHINDER drain to wall suction with mild sanguinous output. Absent bowel sounds. Extremities: Extremities normal, atraumatic, no cyanosis + edema in extremities Pulses: 2+ and symmetric all extremities. Skin: Skin color, texture, turgor normal. No rashes or lesions Neurologic: Sedated however pt will wake and follow commands. Devices: ETT, NGT, Renae, CVL - R IJ, Deloit- L radial. 
  
 
Data:  
 
Recent Results (from the past 24 hour(s)) GLUCOSE, POC Collection Time: 02/29/20  4:43 PM  
Result Value Ref Range Glucose (POC) 132 (H) 70 - 110 mg/dL GLUCOSE, POC Collection Time: 03/01/20 12:04 AM  
Result Value Ref Range Glucose (POC) 171 (H) 70 - 110 mg/dL CARDIAC PANEL,(CK, CKMB & TROPONIN) Collection Time: 03/01/20 12:23 AM  
Result Value Ref Range  (H) 26 - 192 U/L  
 CK - MB 5.5 (H) <3.6 ng/ml CK-MB Index 2.6 0.0 - 4.0 % Troponin-I, QT 0.51 (H) 0.0 - 0.045 NG/ML  
LACTIC ACID Collection Time: 03/01/20 12:23 AM  
Result Value Ref Range Lactic acid 5.6 (HH) 0.4 - 2.0 MMOL/L  
LACTIC ACID Collection Time: 03/01/20  4:20 AM  
Result Value Ref Range Lactic acid 4.1 (HH) 0.4 - 2.0 MMOL/L  
CARDIAC PANEL,(CK, CKMB & TROPONIN) Collection Time: 03/01/20  4:20 AM  
Result Value Ref Range  (H) 26 - 192 U/L  
 CK - MB 5.8 (H) <3.6 ng/ml CK-MB Index 1.4 0.0 - 4.0 % Troponin-I, QT 0.49 (H) 0.0 - 0.045 NG/ML  
CBC WITH AUTOMATED DIFF Collection Time: 03/01/20  4:20 AM  
Result Value Ref Range WBC 17.5 (H) 4.6 - 13.2 K/uL  
 RBC 4.42 4.20 - 5.30 M/uL  
 HGB 12.2 12.0 - 16.0 g/dL HCT 37.3 35.0 - 45.0 % MCV 84.4 74.0 - 97.0 FL  
 MCH 27.6 24.0 - 34.0 PG  
 MCHC 32.7 31.0 - 37.0 g/dL  
 RDW 15.8 (H) 11.6 - 14.5 % PLATELET 037 315 - 800 K/uL MPV 9.5 9.2 - 11.8 FL  
 NEUTROPHILS 17 (L) 42 - 75 % BAND NEUTROPHILS 81 (H) 0 - 5 % LYMPHOCYTES 1 (L) 20 - 51 % MONOCYTES 1 (L) 2 - 9 % EOSINOPHILS 0 0 - 5 % BASOPHILS 0 0 - 3 %  
 ABS. NEUTROPHILS 17.1 (H) 1.8 - 8.0 K/UL  
 ABS. LYMPHOCYTES 0.2 (L) 0.8 - 3.5 K/UL  
 ABS. MONOCYTES 0.2 0 - 1.0 K/UL  
 ABS. EOSINOPHILS 0.0 0.0 - 0.4 K/UL  
 ABS. BASOPHILS 0.0 0.0 - 0.06 K/UL  
 DF MANUAL PLATELET COMMENTS ADEQUATE PLATELETS    
 RBC COMMENTS ANISOCYTOSIS 1+ 
    
 RBC COMMENTS MICROCYTOSIS 2+ 
    
 RBC COMMENTS POLYCHROMASIA 1+ 
    
 RBC COMMENTS HYPOCHROMIA 1+ METABOLIC PANEL, COMPREHENSIVE Collection Time: 03/01/20  4:20 AM  
Result Value Ref Range Sodium 140 136 - 145 mmol/L Potassium 4.0 3.5 - 5.5 mmol/L Chloride 110 100 - 111 mmol/L  
 CO2 20 (L) 21 - 32 mmol/L Anion gap 10 3.0 - 18 mmol/L Glucose 137 (H) 74 - 99 mg/dL BUN 18 7.0 - 18 MG/DL Creatinine 1.19 0.6 - 1.3 MG/DL  
 BUN/Creatinine ratio 15 12 - 20 GFR est AA 56 (L) >60 ml/min/1.73m2 GFR est non-AA 46 (L) >60 ml/min/1.73m2 Calcium 7.1 (L) 8.5 - 10.1 MG/DL Bilirubin, total 0.6 0.2 - 1.0 MG/DL  
 ALT (SGPT) 12 (L) 13 - 56 U/L  
 AST (SGOT) 30 10 - 38 U/L Alk. phosphatase 66 45 - 117 U/L Protein, total 5.2 (L) 6.4 - 8.2 g/dL Albumin 2.3 (L) 3.4 - 5.0 g/dL Globulin 2.9 2.0 - 4.0 g/dL A-G Ratio 0.8 0.8 - 1.7 MAGNESIUM Collection Time: 03/01/20  4:20 AM  
Result Value Ref Range Magnesium 2.0 1.6 - 2.6 mg/dL PHOSPHORUS Collection Time: 03/01/20  4:20 AM  
Result Value Ref Range Phosphorus 3.3 2.5 - 4.9 MG/DL  
CALCIUM, IONIZED Collection Time: 03/01/20  4:20 AM  
Result Value Ref Range Ionized Calcium 0.98 (L) 1.12 - 1.32 MMOL/L  
POC G3  Collection Time: 03/01/20  4:56 AM  
 Result Value Ref Range Device: VENT    
 FIO2 (POC) 60 % pH (POC) 7.285 (L) 7.35 - 7.45    
 pCO2 (POC) 45.1 (H) 35.0 - 45.0 MMHG  
 pO2 (POC) 125 (H) 80 - 100 MMHG  
 HCO3 (POC) 20.9 (L) 22 - 26 MMOL/L  
 sO2 (POC) 98 (H) 92 - 97 % Base deficit (POC) 5 mmol/L Mode ASSIST CONTROL Tidal volume 450 ml Set Rate 20 bpm  
 PEEP/CPAP (POC) 5 cmH2O Allens test (POC) N/A Inspiratory Time 1 sec Total resp. rate 20 Site DRAWN FROM ARTERIAL LINE Patient temp. 102.6 Specimen type (POC) ARTERIAL Performed by Donald Edwards Volume control plus YES    
GLUCOSE, POC Collection Time: 03/01/20  5:04 AM  
Result Value Ref Range Glucose (POC) 142 (H) 70 - 110 mg/dL LACTIC ACID Collection Time: 03/01/20 11:00 AM  
Result Value Ref Range Lactic acid 4.6 (HH) 0.4 - 2.0 MMOL/L  
GLUCOSE, POC Collection Time: 03/01/20 12:02 PM  
Result Value Ref Range Glucose (POC) 99 70 - 110 mg/dL Recent Labs 03/01/20 
0456 02/29/20 
1500 02/29/20 
1051 FIO2I 60 0.80 100 HCO3I 20.9* 20.1* 25.9 PCO2I 45.1* 47.1* 71.2* PHI 7.285* 7.238* 7.169* PO2I 125* 86 231* Telemetry:normal sinus rhythm Imaging: 
I have personally reviewed the patients radiographs and have reviewed the reports: 
 
CXR [02/29/20]: FINDINGS: 
  
Right IJ central venous catheter is now present with tip projected over the lower SVC. Endotracheal tube tip is suboptimally visualized due to overlying spinal fusion hardware but appears to be in satisfactory position approximately 3.3 cm cephalad to the alyssa. Esophagogastric tube projects extending below the left hemidiaphragm before it extends beyond the field-of-view. Bilateral thoracic spine fusion hardware again seen.  EKG leads/wires and other catheter tubing overlie the patient. 
  
Bilateral lower lung opacities consistent with airspace disease and/or atelectasis, developing compared to the preceding radiograph, further assessed on chest CT performed earlier today. Pronounced pulmonary emphysematous disease with fibrotic changes again noted. No evidence of pneumothorax.    
  
The cardiac silhouette is within normal limits in size  for technique.  
  
The known intraperitoneal free air which was visualized underlying the diaphragm on the preceding radiograph and further assessed on CT scan performed earlier 
today is poorly visualized on the current radiograph likely reflecting differences in positioning, previously upright. IMPRESSION:  
Bilateral lower lung opacities consistent with airspace disease and/or atelectasis, developing compared to the preceding radiograph. 
  
CT CHEST/ABD/PELVIS W CONT [02/29/20]: FINDINGS: 
  
CT chest:  
  
Endotracheal tube tip is in place cephalad to the level of the alyssa. Esophagogastric tube is noted, tip in the mid stomach. 
  
Severe pulmonary emphysematous disease again seen with extensive bullous changes most conspicuous in the upper lungs. Scattered scarring. There has been interval development of consolidative appearing lung opacities in the lower lobes bilaterally consistent with airspace disease likely in addition to atelectasis. 
  
The small stable subpleural pulmonary nodule described in the right lower lobe on the recent chest CT is again seen, slightly better visualized on the current study measuring approximately 4 mm, unchanged compared to the CT of 2/6/2018 (axial 34).  Multifocal chronic nodular pleuro-parenchymal scarring in the left upper lobe without significant interval change compared to the preceding chest CT or study of 2/6/2018. 
  
No definite suspicious new pulmonary nodule/mass identified compared to the preceding CT. 
  
No evidence of pathologic lymph node enlargement is seen.   
  
No evidence of pleural or significant pericardial effusion. 
  
CT abdomen/pelvis:  
 
Small ascites, best seen around the liver and in the anterior pelvis adjacent to the distal descending and sigmoid colon. 
  
The spleen is heterogeneous in attenuation and demonstrates several rounded hypodensities as detailed on the recent chest CT of 1/21/2020.  
  
Indeterminate attenuation masses in the kidneys bilaterally, 2.7 cm right kidney upper to midpole laterally, 2.1 cm left kidney interpolar region posteriorly. Further evaluation recommended, beginning with ultrasound might be helpful when clinically feasible if the corresponding lesions can be identified sonographically. Additional smaller subcentimeter renal hypodensities bilaterally, possibly cysts, too small to be specifically characterized. 
  
The liver, gallbladder, pancreas, and adrenal glands appear unremarkable. 
  
Scattered calcifications in the abdominal aorta and its major branches. The abdominal aorta enhances normally without abdominal aortic aneurysm. 
  
A few scattered small subcentimeter short axis lymph nodes bilaterally in the pelvis or retroperitoneum. No evidence of pathologic lymph node enlargement is 
seen. 
  
Moderate quantity of intraperitoneal free air in the nondependent anterior abdomen, consistent with perforated viscus. Additional scattered foci of free 
air elsewhere in the peritoneum and mesentery, including in the right upper quadrant near the gallbladder/duodenum, and scattered bilaterally in the mesenteric/peritoneal fat of the upper and lower abdomen. Grouped small foci of intraperitoneal free air are noted close to the colonic wall along the 
left/anterior side of the sigmoid colon (reference axial 112-119). The left hemicolon is diffusely mildly distended to the rectum containing gas, stool, and 
hyperdense material. Clinical correlation might be helpful regarding recent ingestion of hyperdense material. The rectum measures approximately 8.5 cm in caliber.  The sigmoid measures approximately 7.3 cm in caliber on axial image 115. The site of the or free viscus is uncertain. Common sites could include duodenal or gastric perforation such as may be seen due to perforated ulcer. However, there is small focal hypoattenuation along the wall of the sigmoid colon on series 2 axial images 111-113 which could potentially reflect a small mural defect such as could be seen related to constipation, stercoral colitis, diverticular disease. 
  
The appendix is seen within the ascites in the right lower quadrant, assessment for stranding in the periappendiceal fat limited by the ascites, without gross abnormal thickening of the appendix seen. No gas within the appendix. The right hemicolon appears grossly unremarkable. 
  
There is mild diffuse mural thickening of small bowel loops and mild prominence of enhancement, nonspecific. No definite associated pneumatosis. No portal venous gas is seen. The SMA/SMV appear to maintain usual orientation. Broadly speaking differential considerations could include inflammatory, infectious, 
ischemic. Clinical correlation is recommended including with lactate measurement. Conceivably sequela of peritonitis in the setting of bowel perforation? 
  
Uterus is present. Small gas is noted within the uterus and vagina, nonspecific, can be seen as a physiologic finding. 
  
  
On review of bone windows, there is a superior endplate compression fracture with mildly to moderately decreased vertebral body height at L2. Bilateral spinal fusion rods spanning from [id number]-T12. Severe compression fracture deformity again seen at T9. Compression fracture with rather pronounced decreased vertebral body height again seen at T5. Mild endplate indentations at several other levels including T4, T6, T10.  The posterior midline fluid collection described on the preceding study was better visualized on the preceding study, extensive metallic hardware artifacts noted. 
  
IMPRESSION:  
  
 Moderate quantity of intraperitoneal free air. Findings are consistent with perforated viscus. The site of perforation is uncertain, as discussed above, 
possibly the sigmoid colon where there is apparent small subtle focal hypoattenuation along the wall as detailed above (reference axial images 111-113) close to region where several foci of free air are grouped. The left hemicolon is mildly distended and filled with hyperdense appearing stool. 
  
Mild diffuse mural thickening and enhancement of the small bowel as described. 
  
Small volume of ascites best seen around the liver and in the pelvis adjacent to the sigmoid. 
  
Bibasilar consolidations, right greater than left, suspicious for airspace disease/pneumonia. 
  
Indeterminate attenuation bilateral renal masses, follow-up evaluation recommended. 
  
Splenic hypodensities again seen as described on recent chest CT. 
  
Intubated. Severe pulmonary emphysematous disease and fibrotic changes. 
  
Multiple otherwise nonacute findings as detailed above. 
  
I have discussed these results directly with Dr. Serenity Shepherd in the ED at 12:20 p.m. in addition to the patient's consulted surgeon at 12:25 PM. Critical Care Time: The services I provided to this patient were to treat and/or prevent clinically significant deterioration that could result in the failure of one or more body systems and/or organ systems Services included the following: 
-reviewing nursing notes and old charts 
-direct patient care 
-medication orders and management 
-interpreting and reviewing diagnostic studies/labs 
-re-evaluations 
-documentation time Aggregate critical care time was 55  minutes, which includes only time during which I was engaged in work directly related to the patient's care as described above. During this entire length of time I was immediately available to the patient.  The reason for providing this level of medical care for this critically ill patient was due a critical illness that impaired one or more vital organ systems such that there was a high probability of imminent or life threatening deterioration in the patients condition. This care involved high complexity decision making to assess, manipulate, and support vital system functions, to treat this degreee vital organ system failure and to prevent further life threatening deterioration of the patients condition Complex decision making was made in the evaluation and management plans during this consultation. More than 50% of time was spent in counseling and coordination of care including review of data and discussion with other team members. Josefina Mcintosh DO, [facility] Pulmonary, Critical Care, and Sleep Medicine

## 2020-03-01 NOTE — ANESTHESIA POSTPROCEDURE EVALUATION
Procedure(s): LAPAROTOMY EXPLORATORY, DESCENDING COLECTOMY, CREATION OF COLOSTOMY. general 
 
Anesthesia Post Evaluation Multimodal analgesia: multimodal analgesia used between 6 hours prior to anesthesia start to PACU discharge Patient location during evaluation: ICU Patient participation: complete - patient cannot participate Pain score: 0 Pain management: adequate Airway patency: patent Anesthetic complications: no 
Cardiovascular status: acceptable Respiratory status: acceptable and ETT Hydration status: acceptable Comments: Patient to icu via ambu   Report rn 
Post anesthesia nausea and vomiting:  none Vitals Value Taken Time /58 3/1/2020  6:45 PM  
Temp Pulse 116 3/1/2020  6:48 PM  
Resp 18 3/1/2020  6:48 PM  
SpO2 95 % 3/1/2020  6:48 PM  
Vitals shown include unvalidated device data.

## 2020-03-01 NOTE — PROGRESS NOTES
03/01/20 1497 Weaning Parameters Spontaneous Breathing Trial Complete No (Comments) 
(pt hemodynamically unstable)

## 2020-03-01 NOTE — BRIEF OP NOTE
BRIEF OPERATIVE NOTE Date of Procedure: 3/1/2020 Preoperative Diagnosis: open abdominal surgical incision post ex lap/bowel resection Postoperative Diagnosis: open abdominal surgical incision post ex lap/bowel resection Procedure(s): LAPAROTOMY EXPLORATORY, DESCENDING COLECTOMY, CREATION OF COLOSTOMY Surgeon(s) and Role: Estelita Carrillo MD - Primary Surgical Staff: 
Circ-1: Sahra Rose RN Scrub Tech-1: Dagmar Holliday Surg Asst-1: Mick Abraham Event Time In Time Out Incision Start 03/01/2020 1655 Incision Close 03/01/2020 1800 Anesthesia: General  
Estimated Blood Loss: 200 cc Specimens:  
ID Type Source Tests Collected by Time Destination 1 : descending colon Preservative Colon, Descending  Yared Sneed MD 3/1/2020 1834 Pathology 2 : RECTAL STUMP Preservative Rectal  Yared Sneed MD 3/1/2020 1723 Pathology Findings: Distended and ischemic descending colon. Ischemic stump of rectum. Stool in abdomen Complications: None. Implants: * No implants in log *

## 2020-03-01 NOTE — ANESTHESIA PREPROCEDURE EVALUATION
Relevant Problems No relevant active problems Anesthetic History No history of anesthetic complications Review of Systems / Medical History Patient summary reviewed, nursing notes reviewed and pertinent labs reviewed Pulmonary Within defined limits COPD: moderate Comments: sarcoidosis Neuro/Psych Within defined limits Psychiatric history Cardiovascular Within defined limits Exercise tolerance: >4 METS 
  
GI/Hepatic/Renal 
Within defined limits Liver disease Endo/Other Within defined limits Diabetes: type 1 Hypothyroidism Morbid obesity and arthritis Other Findings Comments: SARCOIDOSIS 
AIR IN THE ABDOMEN 
EMERGENCY EXPL. LAPAROTOMY PROBLEM LIST: 
Past Medical History: 
Diagnosis Date  Abnormally low high density lipoprotein (HDL) cholesterol with hypertriglyceridemia   
  Lipid profile (11/6/2016) showed , , HDL 38, LDL 37  Acute blood loss as cause of postoperative anemia 12/12/2019  Anxiety   
 Bipolar affective disorder ([facility] [address] 12/5/2012  Cellulitis of right forearm 05/04/2017  Chronic back pain   
 Chronic obstructive pulmonary disease (COPD) (HCC)   
  SOB, on [place] O2 Recent admission with psychosis  Chronic respiratory failure with hypoxia (HCC)   
  On home oxygen inhalation at 3 LPM via NC 
 Contusion of left elbow 5/4/2017  Depression   
 Diabetic neuropathy associated with type 2 diabetes mellitus (HCC)   
 Diastolic dysfunction without heart failure   
  Stable on diuretics  Falls frequently   
 Gastroesophageal reflux disease with hiatal hernia   
 Generalized osteoarthritis of multiple sites   
 Gout   
  On Allopurinol  History of acute renal failure 5/31/2013  History of back injury   
  jumped out of second story window  History of fracture due to fall 12/11/2019 
 History of hepatitis C   
  treated  History of penicillin allergy   
  History of vitamin D deficiency 5/10/2017  Hypertensive heart disease without heart failure   
  Better controlled  Hyperuricemia 5/26/2017  Hypothyroidism   
 Hypoxemia requiring supplemental oxygen 12/29/2014  Intravenous drug user 5/2/2017  Long term current use of aspirin   
 Memory difficulty   
 Menopause   
 Mixed connective tissue disease ([facility] [address] 5/10/2017  Obesity, Class I, BMI 30-34. 9   
 Olecranon bursitis of right elbow 5/4/2017  [facility]   
  On Methadone  Recurrent genital herpes 5/31/2013  Right Achilles tendinitis 5/2/2017  Sarcoidosis   
 [facility] cell trait [facility]   
 Tobacco use disorder   
 Type 2 diabetes mellitus with diabetic neuropathy, with long-term current use of insulin ([facility])   
  HbA1c (12/11/2019) = 7.1  Urge urinary incontinence 5/10/2017  Venous insufficiency Physical Exam 
 
Airway Mallampati: II 
TM Distance: 4 - 6 cm Neck ROM: normal range of motion Mouth opening: Normal 
Intubated Cardiovascular Regular rate and rhythm,  S1 and S2 normal,  no murmur, click, rub, or gallop Rhythm: regular Rate: normal 
 
 
 
 Dental 
 
Dentition: Full upper dentures Comments: 2 native lower teeth Pulmonary Breath sounds clear to auscultation Abdominal 
GI exam deferred Other Findings Anesthetic Plan ASA: 4, emergent Anesthesia type: general 
 
Monitoring Plan: CVP Post procedure ventilation Induction: Intravenous Anesthetic plan and risks discussed with: Patient

## 2020-03-02 NOTE — PROGRESS NOTES
[facility]. Pulmonary, Critical Care, and Sleep Medicine Name: Abby Pleitez MRN: [id number] : 1955 Hospital: [address] [place] [address] Dr Date: 3/2/2020  Admission Date: 2020 Chart and notes reviewed. Data reviewed. I have evaluated all findings. [x]I have reviewed the flowsheet and previous days notes. [x]The patient is unable to give any meaningful history or review of systems because the patient is: 
[x]Intubated [x]Sedated  
[]Unresponsive [x]The patient is critically ill on     
[x]Mechanical ventilation [x]Pressors []BiPAP [] Interval HPI: 
Patient is a 62 y.o. female with PMH of COPD (on 3LPM NC home O2), Sarcoidosis, HTN, T2DM,  Hep C, recent Spine Thoracic Laminectomy T6-T11 with Fusion (20) 2/2 compression fx of T9-T10 and spinal cord compression at T9 and T10 and spinal cord edema with subsequent paraparesis, who presented to [facility] ED on 20 c/o acute abdominal pain, SOB with associated constipation x4-5 days PTA, likely 2/2 aforementioned spinal surgery on 19. Pt initially placed on BiPAP in ED for respiratory distress. CT Chest/Abd/Pelvis showed intraperitoneal free air consistent with perforated viscus. Pt was ultimately intubated in ED for worsening overall status and respiratory failure. Sepsis alert triggered and protocol initiated. General Surgery took pt for emergent ex lap. Pt presented to ICU on mechanical ventilation, s/p ex lap where patient was found to have sigmoid colon perforation, 2/2 constipation w/ large stool burden spilling into the peritoneum per Dr. Aissatou Mcginnis. Pt received Albumin 25% bolus and 5% bolus, Wil-Synephrine and Levophed intraoperatively. Pt arrived to ICU on Levophed at 20mcg/min. Pt was left with an open abdomen with dressing closure with MOHINDER drain set to suction. Dr. Aissatou Mcginnis took pt back to OR on 3/1 for abdominal washout and closure. Today (3/2), pt with about 8 episodes of Sinus tach to SVT getting as high as the 200s this morning, each episode lasting about 30 seconds. EKGs showed sinus tach then atrial flutter. Pt was awake, uncomfortable-appearing and reporting abdominal pain, so fentanyl was increased. Sedation was switched to Propofol with a RASS goal of -3 to -4. Continued with the tachy episodes while on increased sedation. Dose of Digoxin was given initially. Later required Amiodarone bolus then gtt. Once on the drip, pt became hypotensive and required escalation of pressors. Subjective 03/02/20 Hospital Day: 3 Vent Day: 2 Overnight events: Wil-synephrine weaned. Good UOP, no further fluid resuscitation required. Mentation/Activity: Awake, tracking, nodding appropriately to questions, follows basic commands. Respiratory/ Secretions: Intubated on mechanical ventilation. Hemodynamics: Vasopressors were being weaned, however, pt became hypotensive on Amiodarone drip requiring escalation of pressors. Urine output, bowel: small volume raphael blood in ostomy bag with small amount of stool Diet: NPO Need for procedures: none ROS:Review of systems not obtained due to patient factors. Events and notes from last 24 hours reviewed. Care plan discussed on multidisciplinary rounds. Patient Active Problem List  
Diagnosis Code  Diabetic neuropathy associated with type 2 diabetes mellitus (HCC) E11.40  Venous insufficiency I87.2  Obesity, Class I, BMI 30-34.9 E66.9  Chronic back pain M54.9, G89.29  
 Generalized osteoarthritis of multiple sites M15.9  Bipolar affective disorder ([facility] [address] F31.9  Abnormally low high density lipoprotein (HDL) cholesterol with hypertriglyceridemia E78.6, E78.1  Diastolic dysfunction without heart failure I51.89  Chronic obstructive pulmonary disease (COPD) ([facility] [address] J44.9  Hypertensive heart disease without heart failure I11.9  Depression F32.9  History of back injury Z87.828  Type 2 diabetes mellitus with diabetic neuropathy, with long-term current use of insulin ([facility]) E11.40, Z79.4  Anxiety F41.9  Wears glasses Z97.3  History of hepatitis C Z86.19  
 Sickle cell trait ([facility]) D57.3  History of acute renal failure Z87.448  Hypothyroidism E03.9  Recurrent genital herpes A60.00  Sarcoidosis D86.9  Abscess of right arm L02.413  Hypoxemia requiring supplemental oxygen R09.02, Z99.81  
 Abnormal nuclear stress test R94.39  
 Cellulitis and abscess of hand L03.119, L02.519  
 Cellulitis and abscess of foot L03.119, L02.619  
 Cellulitis of arm, right L03. 113  
 Olecranon bursitis of right elbow M70.21  
 Contusion of left elbow S50. 02XA  Intravenous drug user F19.90  Right Achilles tendinitis M76.61  
 History of penicillin allergy Z88.0  Falls frequently R29.6  Gastroesophageal reflux disease with hiatal hernia K21.9, K44.9  Memory difficulty R41.3  Mixed connective tissue disease ([facility] [address] M35.1  Impaired mobility and ADLs Z74.09  
 Hyperuricemia E79.0  History of vitamin D deficiency Z86.39  
 Urge urinary incontinence N39.41  Spinal cord compression ([facility]) G95.20  Compression fracture of body of thoracic vertebra ([facility]) S22.000A  Status post laminectomy with spinal fusion Z98.1  Acute blood loss as cause of postoperative anemia D62  
 History of fracture due to fall Z87.81  Chronic respiratory failure with hypoxia ([facility]) J96.11  
 Cellulitis of right forearm L03. 113  
 Gout M10.9  Menopause Z78.0  Long term current use of aspirin Z79.82  
 Opioid dependence ([facility]) F11.20  Tobacco use disorder F17.200  Sepsis ([facility] [address] A41.9  Perforated viscus R19.8  Septic shock ([facility]) A41.9, R65.21 Vital Signs: 
Visit Vitals /60 Pulse 88 Temp 100.2 °F (37.9 °C) Resp 20 Ht 5' 4\" (1.626 m) Wt 85.6 kg (188 lb 11.4 oz) LMP 11/25/2012 (Exact Date) SpO2 97% BMI 32.39 kg/m² O2 Device: Ventilator Temp (24hrs), Av.3 °F (37.4 °C), Min:96.8 °F (36 °C), Max:100.2 °F (37.9 °C) Intake/Output:  
Last shift:      701 - 1900 In: 491.7 [I.V.:491.7] Out: 700 [Urine:700] Last 3 shifts: 1901 -  0700 In: 4962.2 [I.V.:4852.2] Out: 9712 [Urine:4700; Drains:400] Intake/Output Summary (Last 24 hours) at 3/2/2020 1708 Last data filed at 3/2/2020 1614 Gross per 24 hour Intake 4397.94 ml Output 2870 ml Net 1527.94 ml Ventilator Settings: 
Ventilator Mode: Assist control, VC+ Respiratory Rate Resp Rate Observed: 27 Back-Up Rate: 16 Insp Time (sec): 1 sec I:E Ratio: 1;2.6 Ventilator Volumes Vt Set (ml): 450 ml Vt Exhaled (Machine Breath) (ml): 493 ml Vt Spont (ml): 507 ml Ve Observed (l/min): 10.6 l/min Ventilator Pressures Pressure Support (cm H2O): 7 cm H2O 
PIP Observed (cm H2O): 19 cm H2O Plateau Pressure (cm H2O): 16 cm H2O 
MAP (cm H2O): 9.9 PEEP/VENT (cm H2O): 5 cm H20 Auto PEEP Observed (cm H2O): 0.1 cm H2O Current Facility-Administered Medications Medication Dose Route Frequency  heparin (porcine) injection 5,000 Units  5,000 Units SubCUTAneous Q8H  
 dexmedeTOMidine (PRECEDEX) 400 mcg in 0.9% sodium chloride 100 mL infusion  0.2-0.7 mcg/kg/hr IntraVENous TITRATE  propofol (DIPRIVAN) 10 mg/mL infusion  0-50 mcg/kg/min IntraVENous TITRATE  amiodarone (NEXTERONE) 360 mg in dextrose 200 mL (1.8 mg/mL) infusion  0.5-1 mg/min IntraVENous TITRATE  calcium gluconate 2 g in 0.9% sodium chloride 100 mL IVPB  2 g IntraVENous ONCE  piperacillin-tazobactam (ZOSYN) 3.375 g in 0.9% sodium chloride (MBP/ADV) 100 mL MBP  3.375 g IntraVENous Q6H  
 chlorhexidine (PERIDEX) 0.12 % mouthwash 10 mL  10 mL Oral Q12H  electrolyte-r (NORMOSOL R) infusion   IntraVENous CONTINUOUS  
 pantoprazole (PROTONIX) 40 mg in 0.9% sodium chloride 10 mL injection  40 mg IntraVENous Q12H  fentaNYL (PF) 900 mcg/30 ml infusion soln  0-200 mcg/hr IntraVENous TITRATE  midazolam in normal saline (VERSED) 2 mg/mL infusion  1-10 mg/hr IntraVENous TITRATE  hydrocortisone Sod Succ (PF) (SOLU-CORTEF) injection 50 mg  50 mg IntraVENous Q6H  
 albuterol-ipratropium (DUO-NEB) 2.5 MG-0.5 MG/3 ML  3 mL Nebulization Q4H RT  
 budesonide (PULMICORT) 500 mcg/2 ml nebulizer suspension  500 mcg Nebulization BID RT  
 insulin lispro (HUMALOG) injection   SubCUTAneous Q6H  
 anidulafungin (ERAXIS) 100 mg in 0.9% sodium chloride 130 mL IVPB  100 mg IntraVENous Q24H  
 NOREPINephrine (LEVOPHED) 32 mg in 5% dextrose 250 mL infusion  0.5-30 mcg/min IntraVENous TITRATE  PHENYLephrine (OLU-SYNEPHRINE) 90 mg in 0.9% sodium chloride 250 mL infusion   mcg/min IntraVENous TITRATE  vasopressin (VASOSTRICT) 20 Units in 0.9% sodium chloride 100 mL infusion  0.04 Units/min IntraVENous CONTINUOUS Telemetry: [x]Sinus [x]A-flutter []Paced []A-fib []Multiple PVCs Physical Exam:  
  
General: Intubated, sedated, mildly uncomfortable appearing, but NAD HEENT:  PERRL, mucosa moist 
Resp:  Symmetrical chest expansion, no accessory muscle use;  no rales/ wheezes noted CV:  S1, S2 present; tachycardic GI:  Abdomen distended but non-rigid; absent bowel sounds. Ostomy bag noted with small volume bloody output. Extremities: +2 pulses on all extremities; moderate pitting edema bilateral lower extremities. Bilateral edema in the hands. Skin: Warm; no rashes/ lesions noted, normal turgor/cap refill Neurologic:  Sedated, but able to nod to questions and follow basic commands Devices:  ETT, NGT, Central line R IJ, L Radial Arterial Line, Renae DATA: 
MAR reviewed and pertinent medications noted or modified as needed Labs: 
Recent Labs 03/02/20 
0410 03/01/20 
0420 02/29/20 
1500 WBC 20.8* 17.5* 10.5 HGB 10.7* 12.2 11.4* HCT 32.7* 37.3 36.0  182 153 Recent Labs 03/02/20 
1420 03/02/20 
0410 03/01/20 
0420 02/29/20 
1500  142 140 138  
K 4.0 4.3 4.0 3.1*  
* 113* 110 109 CO2 22 21 20* 21 * 95 137* 138* BUN 18 17 18 19* CREA 0.97 1.02 1.19 1.40* CA 7.3* 7.1* 7.1* 7.0*  
MG 2.2 2.0 2.0 1.5* PHOS 3.7 4.3 3.3 4.7 ALB 2.0* 2.2* 2.3* 2.5* SGOT 44* 44* 30 18 ALT 16 13 12* 11* INR  --   --   --  1.4* No results for input(s): PH, PCO2, PO2, HCO3, FIO2 in the last 72 hours. Recent Labs 03/02/20 
0411 03/01/20 
0456 02/29/20 
1500 FIO2I 60 60 0.80 HCO3I 21.1* 20.9* 20.1* PCO2I 33.9* 45.1* 47.1* PHI 7.406 7.285* 7.238* PO2I 87 125* 86 Imaging: 
[x]   I have personally reviewed the patients radiographs and reports XR Results (most recent): CXR Results  (Last 48 hours) 03/02/20 0557  XR CHEST PORT Final result Impression:  IMPRESSION:  
   
1.  ET tube, NG/OG tube and right IJ central line in place as described. 2.  Mid to lower lung zone opacities bilaterally, stable to slightly increased  
in extent compared to 03/01/20. Narrative:  EXAM:  Chest Portable. INDICATION:  Respiratory failure. Check ET tube placement. COMPARISON:  03/01/20. TECHNIQUE:  Portable AP chest study FINDINGS:   
   
- ET tube:  ET tube distal tip at 3.6 cm above the alyssa.  
- Enteric tube:  NG/OG tube placement. Distal tip courses below the inferior  
margin of the field of view well beyond the level of the diaphragmatic hiatus. - Central access catheter:  Right IJ central line placement, with the distal tip  
in the superior vena cava at or just below the level of the alyssa.  
- Aeration pattern:  Fairly dense streaky and hazy opacities are noted in the  
mid to lower lung zones bilaterally obscuring the diaphragmatic outlines stable  
to mildly increased compared to 03/01/20.  
- Cardiomediastinal silhouette:  No significant cardiac silhouette enlargement. - Pleura:  No pneumothorax. 03/01/20 0310  XR CHEST PORT Final result Impression:  IMPRESSION:   
   
Interval diffuse increase in interstitial prominence and bilateral hazy lung  
opacity suspicious for mild pulmonary edema superimposed upon underlying chronic  
advanced pulmonary emphysematous disease and fibrotic changes. Bibasilar lung opacities consistent with airspace disease/consolidation better  
visualized on recent CT, similar to, if anything, minimally decreased compared  
to the preceding radiograph. Tip of esophageal probe projects right of midline over the lower neck likely at  
the level of the right hypopharynx, clinical correlation, please. Narrative:  AP CHEST, PORTABLE  
   
CPT CODE: 67090 INDICATION: Above. Intubated. COMPARISON: 02/29/2020. TECHNIQUE: Portable  semi-upright AP chest radiograph is reviewed. FINDINGS:  
   
Endotracheal tube tip projects in satisfactory position approximately 3.5 cm  
cephalad to the alyssa. Right IJ central venous catheter tip projects over the  
SVC. Esophageal probe demonstrates tip projected over the right lower neck,  
possibly at the level of the hypopharynx. Esophagogastric tube projects  
extending to the level of the distal esophagus before it extends beyond the  
field-of-view. Bilateral spinal fusion hardware again seen. Multiple EKG  
leads/wires and other catheter tubing overlie the patient. Interval diffuse increased interstitial prominence and hazy lung opacity  
compared to prior, suspect for a component of mild pulmonary edema superimposed  
upon pronounced chronic pulmonary emphysematous disease and fibrotic changes  
noted on CT. Bibasilar lung opacities, right greater than left, consistent with  
airspace disease better visualized on recent chest CT, similar to, if anything,  
minimally decreased compared to the preceding radiograph. No evidence of  
pneumothorax. The patient is somewhat rotated and tilted. The cardiac mediastinal silhouette  
is without significant interval change allowing for differences in  
positioning/technique. CT Results (most recent): 
Results from [facility] Encounter encounter on 02/29/20 CT CHEST ABD PELV W CONT Narrative CT SCAN OF THE CHEST, ABDOMEN AND PELVIS, WITH CONTRAST INDICATION: Above. Arrived with shortness of breath and abdominal pain. Septic 
shock. Hypoxia. History of sarcoidosis/COPD. Altered. Abdominal distention and 
tympani. Intraperitoneal free air on chest radiograph. Perforated viscus. COMPARISON: Chest CT 1/21/2010. No prior abdominopelvic CT in PACS. Report is 
noted in the electronic medical record (care everywhere) of previous noncontrast 
enhanced abdominopelvic CT performed elsewhere 7/21/2014. TECHNIQUE: With IV administration of 70 mL Isovue-300, without administration of 
oral contrast, helically acquired serial axial CT images through the chest, 
abdomen and pelvis were obtained. Additional coronal and sagittal reformation 
images were also performed. All CT scans at this facility are performed using dose optimization technique as 
appropriate to the performed exam, to include automated exposure control, 
adjustment of the mA and/or kV according to patient's size (Including 
appropriate matching for site-specific examinations), or use of iterative 
reconstruction technique. FINDINGS: 
 
CT chest:  
 
Endotracheal tube tip is in place cephalad to the level of the alyssa. Esophagogastric tube is noted, tip in the mid stomach. Severe pulmonary emphysematous disease again seen with extensive bullous changes 
most conspicuous in the upper lungs. Scattered scarring. There has been interval 
development of consolidative appearing lung opacities in the lower lobes 
bilaterally consistent with airspace disease likely in addition to atelectasis. The small stable subpleural pulmonary nodule described in the right lower lobe 
on the recent chest CT is again seen, slightly better visualized on the current 
study measuring approximately 4 mm, unchanged compared to the CT of 2/6/2018 
(axial 34). Multifocal chronic nodular pleuro-parenchymal scarring in the left 
upper lobe without significant interval change compared to the preceding chest 
CT or study of 2/6/2018. No definite suspicious new pulmonary nodule/mass identified compared to the 
preceding CT. No evidence of pathologic lymph node enlargement is seen. No evidence of pleural or significant pericardial effusion. CT abdomen/pelvis:  
 
Small ascites, best seen around the liver and in the anterior pelvis adjacent to 
the distal descending and sigmoid colon. The spleen is heterogeneous in attenuation and demonstrates several rounded 
hypodensities as detailed on the recent chest CT of 1/21/2020. Indeterminate attenuation masses in the kidneys bilaterally, 2.7 cm right kidney 
upper to midpole laterally, 2.1 cm left kidney interpolar region posteriorly. Further evaluation recommended, beginning with ultrasound might be helpful when 
clinically feasible if the corresponding lesions can be identified 
sonographically. Additional smaller subcentimeter renal hypodensities 
bilaterally, possibly cysts, too small to be specifically characterized. The liver, gallbladder, pancreas, and adrenal glands appear unremarkable. Scattered calcifications in the abdominal aorta and its major branches. The 
abdominal aorta enhances normally without abdominal aortic aneurysm. A few scattered small subcentimeter short axis lymph nodes bilaterally in the 
pelvis or retroperitoneum. No evidence of pathologic lymph node enlargement is 
seen. Moderate quantity of intraperitoneal free air in the nondependent anterior 
abdomen, consistent with perforated viscus. Additional scattered foci of free air elsewhere in the peritoneum and mesentery, including in the right upper 
quadrant near the gallbladder/duodenum, and scattered bilaterally in the 
mesenteric/peritoneal fat of the upper and lower abdomen. Grouped small foci of 
intraperitoneal free air are noted close to the colonic wall along the 
left/anterior side of the sigmoid colon (reference axial 112-119). The left 
hemicolon is diffusely mildly distended to the rectum containing gas, stool, and 
hyperdense material. Clinical correlation might be helpful regarding recent 
ingestion of hyperdense material. The rectum measures approximately 8.5 cm in 
caliber. The sigmoid measures approximately 7.3 cm in caliber on axial image 115. The site of the or free viscus is uncertain. Common sites could include 
duodenal or gastric perforation such as may be seen due to perforated ulcer. However, there is small focal hypoattenuation along the wall of the sigmoid 
colon on series 2 axial images 111-113 which could potentially reflect a small 
mural defect such as could be seen related to constipation, stercoral colitis, 
diverticular disease. The appendix is seen within the ascites in the right lower quadrant, assessment 
for stranding in the periappendiceal fat limited by the ascites, without gross 
abnormal thickening of the appendix seen. No gas within the appendix. The right 
hemicolon appears grossly unremarkable. There is mild diffuse mural thickening of small bowel loops and mild prominence 
of enhancement, nonspecific. No definite associated pneumatosis. No portal 
venous gas is seen. The SMA/SMV appear to maintain usual orientation. Broadly 
speaking differential considerations could include inflammatory, infectious, 
ischemic. Clinical correlation is recommended including with lactate 
measurement. Conceivably sequela of peritonitis in the setting of bowel 
perforation? Uterus is present.  Small gas is noted within the uterus and vagina, nonspecific, 
can be seen as a physiologic finding. On review of bone windows, there is a superior endplate compression fracture 
with mildly to moderately decreased vertebral body height at L2. Bilateral 
spinal fusion rods spanning from P9-Z4-R48-T12. Severe compression fracture 
deformity again seen at T9. Compression fracture with rather pronounced 
decreased vertebral body height again seen at T5. Mild endplate indentations at 
several other levels including T4, T6, T10. The posterior midline fluid 
collection described on the preceding study was better visualized on the 
preceding study, extensive metallic hardware artifacts noted. Impression Impression: Moderate quantity of intraperitoneal free air. Findings are consistent with 
perforated viscus. The site of perforation is uncertain, as discussed above, 
possibly the sigmoid colon where there is apparent small subtle focal 
hypoattenuation along the wall as detailed above (reference axial images 111-113) close to region where several foci of free air are grouped. The left 
hemicolon is mildly distended and filled with hyperdense appearing stool. Mild diffuse mural thickening and enhancement of the small bowel as described. Small volume of ascites best seen around the liver and in the pelvis adjacent to 
the sigmoid. Bibasilar consolidations, right greater than left, suspicious for airspace 
disease/pneumonia. Indeterminate attenuation bilateral renal masses, follow-up evaluation 
recommended. Splenic hypodensities again seen as described on recent chest CT. Intubated. Severe pulmonary emphysematous disease and fibrotic changes. Multiple otherwise nonacute findings as detailed above. I have discussed these results directly with Dr. Harrison Hebert in the ED at 12:20 p.m. 
in addition to the patient's consulted surgeon at 12:25 PM. IMPRESSION:  
· Acute on Chronic Respiratory Failure - Emergently intubated in ED for airway protection, 2/2 below. Now post-op and remains on mechanical ventilation. Chronically on 3L NC at home. · Acute Sigmoid Colon Perforation - s/p Emergent Ex-Lap w/ drainage of intraabdominal stool and collections; sigmoid colectomy with Dr. Natalie Alexander on 02/29/20. Subsequent washout and abdominal closure on 03/01/20. · Tachyarrhythmia - sinus tachycardia going into atrial flutter; digoxin unsuccessful, bolused with Amio and gtt started; pt became hypotensive w/ Amio, requiring escalation of pressors. · Septic Shock with MSOF - 2/2 above. Improving. Currently on Levophed,  Wil-Synephrine. · Peritonitis - 2/2 above - Sigmoid Colon Perforation with large intraabdominal stool burden. · Bandemia - Initially with 22% bands. Now shifting to leukocytosis. · Lactic Acidosis - Initially 6.7 in ED. 2/2 above. · Metabolic Acidosis - 2/2 above · ELIF - Likely pre-renal, 2/2 above. · UTI - GN rods · Lytes - Hypocalcemia, hypokalemia, hypomagnesemia post-op, correcting per protocol · T2DM - Lantus 20u qhs at home. · Paraparesis, in wheelchair: Recent Hx of Spine Thoracic Laminectomy T6-T11 with Fusion (12/11/20) with Dr. Tyrone Rojo 2/2 Acute compression fx of T9-T10 and spinal cord compression at T9 and T10 and spinal cord edema with subsequent paraparesis. · Hx of Sarcoidosis - Chronic steroid use · Hx of COPD - Follows with Dr. Mateo Mcnally · Hx of Cocaine and Heroin Abuse - Noted to be Methadone at home. Patient Active Problem List  
Diagnosis Code  Diabetic neuropathy associated with type 2 diabetes mellitus (HCC) E11.40  Venous insufficiency I87.2  Obesity, Class I, BMI 30-34.9 E66.9  Chronic back pain M54.9, G89.29  
 Generalized osteoarthritis of multiple sites M15.9  Bipolar affective disorder ([facility] [address] F31.9  Abnormally low high density lipoprotein (HDL) cholesterol with hypertriglyceridemia E78.6, E78.1  Diastolic dysfunction without heart failure I51.89  
  Chronic obstructive pulmonary disease (COPD) ([facility] [address] J44.9  Hypertensive heart disease without heart failure I11.9  Depression F32.9  History of back injury Z87.828  Type 2 diabetes mellitus with diabetic neuropathy, with long-term current use of insulin ([facility]) E11.40, Z79.4  Anxiety F41.9  Wears glasses Z97.3  History of hepatitis C Z86.19  
 Sickle cell trait ([facility]) D57.3  History of acute renal failure Z87.448  Hypothyroidism E03.9  Recurrent genital herpes A60.00  Sarcoidosis D86.9  Abscess of right arm L02.413  Hypoxemia requiring supplemental oxygen R09.02, Z99.81  
 Abnormal nuclear stress test R94.39  
 Cellulitis and abscess of hand L03.119, L02.519  
 Cellulitis and abscess of foot L03.119, L02.619  
 Cellulitis of arm, right L03. 113  
 Olecranon bursitis of right elbow M70.21  
 Contusion of left elbow S50. 02XA  Intravenous drug user F19.90  Right Achilles tendinitis M76.61  
 History of penicillin allergy Z88.0  Falls frequently R29.6  Gastroesophageal reflux disease with hiatal hernia K21.9, K44.9  Memory difficulty R41.3  Mixed connective tissue disease ([facility] [address] M35.1  Impaired mobility and ADLs Z74.09  
 Hyperuricemia E79.0  History of vitamin D deficiency Z86.39  
 Urge urinary incontinence N39.41  Spinal cord compression ([facility]) G95.20  Compression fracture of body of thoracic vertebra ([facility]) S22.000A  Status post laminectomy with spinal fusion Z98.1  Acute blood loss as cause of postoperative anemia D62  
 History of fracture due to fall Z87.81  Chronic respiratory failure with hypoxia ([facility]) J96.11  
 Cellulitis of right forearm L03. 113  
 Gout M10.9  Menopause Z78.0  Long term current use of aspirin Z79.82  
 Opioid dependence ([facility]) F11.20  Tobacco use disorder F17.200  Sepsis ([address] A41.9  Perforated viscus R19.8  Septic shock ([facility] [address] A41.9, R65.21  
 
  
RECOMMENDATIONS:  
 Neuro: Fentanyl and Propofol gtt for post-op pain control and sedation. Anticipate pt will have high opoid tolerance - noted to be on Methadone (25mg qd) at home. Titrate sedation for RASS goal -3 to -4 due to post-op status. Daily sedation holiday. Pulm: Aspiration precautions, HOB>30'. VAP Bundle Titrate FiO2 O2 for SpO2 >88-92%; optimize bronchial hygiene. Duo-nebs q4 and Albuterol nebs q6 PRN. Pulmicort nebs BID. Daily CXR and ABG while intubated CVS: Monitor HD, MAP goal >65. Requiring support with Levophed and Wil-synephrine currently. GI: NPO. Not appropriate for enteral nutrition at this time per surgery. Renal: Trend Cr, UOP. Rousseau (+). Continue coverage with Vanc and Zosyn. Hem/Onc: Trend H/H, monitor for s/o active bleeding. Check CBC and PT/INR post-op. I/D: Trend WBCs and temperature curve. Pt with increasing leukocytosis. Sepsis bundle per hospital protocol, BxCx and UC currently growing GNR. Con't Zosyn and Vanc. Trend LA q4hrs until normal. Continue Eraxis for antifungal ppx, pt is at high risk for fungemia with bowel perforation. Metabolic: Daily BMP, mag, phos. Trend lytes and replace per protocol. Endocrine: Q6 glucoses. SSI. Avoid hypoglycemia. Musc/Skin: General surgery managing surgical incision care. Full Code Discussed in interdisciplinary rounds Best Practices/Safety Bundles: 
· Sepsis Bundle per Hospital Protocol · Glycemic control; avoid Hypoglycemia · IHI ICU Bundles: 
·      Central Line Bundle Followed , Rousseau Bundle Followed and Vent Bundle Followed, Vent Day 2   
· [facility] Vent patients/ Pulmonary pts:  
· VAP bundle, Aim to keep peak plateau pressure 03-95CX H2O 
· Aspiration Precautions - HOB >30' · Daily sedation holiday as indicated · SBT as tolerated/appropriate · Stress ulcer prophylaxis: Protonix · DVT prophylaxis: SCDs, heparin SQ 
· Need for Lines, rousseau assessed. · Restraints need. High complexity decision making was performed during this consultation and evaluation. [x]       Pt is at high risk for further organ failure and dysfunction. Farhana Braxton PA-C 
03/02/20 Pulmonary, Critical Care Medicine [facility]

## 2020-03-02 NOTE — WOUND CARE
Physical Exam  
Room 306: pt has LUQ abdomen transverse colostomy, 2 3/4\" flange, appliance intact at this time. Pt is currently vented.   
Miguel Ángel Henderson BSN, RN, [facility], [address]

## 2020-03-02 NOTE — PROGRESS NOTES
Pharmacy Services - PTA Methadone Dose Confirmed methadone dose with Maximiano Daily at [address] [place] [address]. [phone]. Most recent dose was 25 mg daily, last dose 2/29/2020. Thank you, Ervin Keller, PharmD

## 2020-03-02 NOTE — PROGRESS NOTES
NUTRITION Nutrition Screen RECOMMENDATIONS / PLAN:  
 
- Monitor ability to tolerate enteral nutrition versus need for parenteral nutrition.  
- Continue RD inpatient monitoring and evaluation. NUTRITION INTERVENTIONS & DIAGNOSIS:  
 
- Enteral nutrition: not appropriate at this time. - Collaboration and referral of nutrition care: interdisciplinary rounds Nutrition Diagnosis: Inadequate oral intake related to respiratory status, altered GI function as evidenced by pt NPO. ASSESSMENT:  
 
S/p ex lap, descending colectomy and creation of colostomy on 3/1 then re-ex lap, drainage of intra-abdominal stool collection, descending colectomy with mobilization of the splenic flexure and transverse colostomy. Remains intubated on the vent postop. Nutritional intake adequate to meet patients estimated nutritional needs:  No 
 
Diet: DIET NPO Food Allergies: NKFA Current Appetite: Not Applicable - NPO Appetite/meal intake prior to admission: Unable to determine at this time Feeding Limitations:  [] Swallowing difficulty    [] Chewing difficulty    [x] Other: respiratory status Current Meal Intake: No data found. BM: PTA; colostomy Skin Integrity: abdominal surgical incision; MOHINDER drain Edema:   [x] No     [] Yes Pertinent Medications: Reviewed: steroid, SSI, pantoprazole, phenylephrine, propofol at 50 mcg/kg/min (678 kcal per day), vasopressin Recent Labs 03/02/20 
0410 03/01/20 
0420 02/29/20 
1500  140 138  
K 4.3 4.0 3.1*  
* 110 109 CO2 21 20* 21  
GLU 95 137* 138* BUN 17 18 19* CREA 1.02 1.19 1.40* CA 7.1* 7.1* 7.0*  
MG 2.0 2.0 1.5* PHOS 4.3 3.3 4.7 ALB 2.2* 2.3* 2.5* SGOT 44* 30 18 ALT 13 12* 11* Intake/Output Summary (Last 24 hours) at 3/2/2020 1223 Last data filed at 3/2/2020 1115 Gross per 24 hour Intake 4222.5 ml Output 4520 ml Net -297.5 ml Anthropometrics: 
Ht Readings from Last 1 Encounters: 02/29/20 5' 4\" (1.626 m) Last 3 Recorded Weights in this Encounter 02/29/20 0902 02/29/20 1502 03/02/20 0500 Weight: 81.6 kg (180 lb) 88 kg (194 lb 0.1 oz) 85.6 kg (188 lb 11.4 oz) Body mass index is 32.39 kg/m². Obese, Class I Weight History: previously reported intentional weight loss and previous usual weight of 230 lb Weight Metrics 3/2/2020 2/28/2020 1/22/2020 1/17/2020 1/2/2020 12/16/2019 12/14/2019 Weight 188 lb 11.4 oz - 171 lb 11.2 oz - 172 lb 6.4 oz - 178 lb 9.6 oz BMI 32.39 kg/m2 28.57 kg/m2 - 28.57 kg/m2 - 28.69 kg/m2 - Some encounter information is confidential and restricted. Go to Review Flowsheets activity to see all data. Admitting Diagnosis: Septic shock ([facility] [address] [A41.9, R65.21] Perforated viscus [R19.8] Pertinent PMHx: COPD, HTN, DM, hepatitis C, sarcoidosis, methadone use Education Needs:        [x] None identified  [] Identified - Not appropriate at this time  []  Identified and addressed - refer to education log Learning Limitations:   [] None identified  [x] Identified: altered mentation Cultural, Jew & ethnic food preferences:  [x] None identified    [] Identified and addressed ESTIMATED NUTRITION NEEDS:  
 
Calories: 946-1204 kcal (11-14 kcal/kg) based on  [x] Actual BW 86 kg     [] IBW Protein: 110-138 gm (2-2.5 gm/kg) based on  [] Actual BW      [x] IBW 55 kg Fluid: 1 mL/kcal 
  
MONITORING & EVALUATION:  
 
Nutrition Goal(s):  
- Nutritional needs will be met through adequate oral intake or nutrition support within the next 7 days. Outcome: New/Initial goal  
 
Monitoring:   [x] Food and nutrient intake   [x] Food and nutrient administration  [x] Comparative standards   [x] Nutrition-focused physical findings   [x] Anthropometric Measurements   [x] Treatment/therapy   [x] Biochemical data, medical tests, and procedures Previous Recommendations (for follow-up assessments only):  Not Applicable Discharge Planning: Nutritional discharge needs unknown at this time. Participated in care planning, discharge planning, & interdisciplinary rounds as appropriate. Estephanie Kern RD, [facility] Pager: [phone]

## 2020-03-02 NOTE — OP NOTES
[address] [place] [address]  
OPERATIVE REPORT Name:  Luis Daniel Lama 
MR#:   [id number] :  1955 ACCOUNT #:  [de-identified] DATE OF SERVICE:  2020 PREOPERATIVE DIAGNOSIS:  Septic shock with perforated sigmoid colon. POSTOPERATIVE DIAGNOSIS:  Septic shock with perforated sigmoid colon and distention of the descending colon with ischemia. PROCEDURES PERFORMED:  Re-exploratory laparotomy, drainage of intra-abdominal stool collection, descending colectomy with mobilization of the splenic flexure, and transverse colostomy. SURGEON:  Franklin Stearns MD 
 
ASSISTANT: Kaylee Black . ANESTHESIA:  General. 
 
COMPLICATIONS:  None. SPECIMENS REMOVED: 
1. Descending colon. 2.  Stump of the rectum. IMPLANTS:  None. ESTIMATED BLOOD LOSS:  200 mL. INDICATION FOR SURGERY:  The patient is a 18-year-old patient with a critical condition of septic shock secondary to perforated sigmoid colon that I took yesterday to Surgery for exploratory laparotomy after discovering she had pneumoperitoneum on a CT scan, and she was already intubated in the ICU. 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   So at this point, after discussing it with the daughter and the ICU attending, we assumed that possibly her colon is not functioning very well, and I wanted to make sure that if I do a colostomy, the colon is completely viable, otherwise she would have difficulty in functioning of the colon. Also, the patient was still in septic shock, and I spoke with the daughter in detail about the high risk of the surgery as well as the poor prognosis given her multiorgan failure. A consent was taken from the daughter, and she agreed on the re-laparotomy and the colostomy formation. DETAILS OF PROCEDURE:  The patient was brought to operating room. She was already intubated. She was placed on the table, and then, a time-out was performed. Dressing was removed, the Ioban and the MOHINDER drain were taken out. The Kerlix was taken out as well as the 10 x 10 drape. The small bowel was visualized with the omentum. At this point, we left the omentum, and we mobilized the small bowel gently, and there was a pocket of stool, not large but small amount in the pelvis, and it turned out it is coming mainly from the rectal stump from around the area of necrosis of the rectal stump, and also, there was some duskiness and possible necrosis of the descending colon as well as significant dilatation, and it was filled with stool. At this point, close attention was made to the rectal stump, and I took all the stool out, and I tried to see if I can fire a ERIC on normal-looking rectum. However, it was so distended with stool, so I had to open the rectal stump and drain the stool, and at this point after obtaining a large amount of stool, I was able to fire 3 ERIC to divide the rectal stump, and this was sent for permanent pathology. Then, attention was made to the left colon, descending colon. At this point, it was mobilized. There was a lot of edema and significant distention of the colon.   I went all the way to the splenic flexure, which I mobilized as well using a LigaSure Impact, and at this point, I entered the lesser sac, and I made sure to not injure the stomach or the mesentery of the transverse colon, and I took part of the omentum, and after mobilizing the splenic flexure, I was able to identify that the colon was extremely distended and dusky up until the mid transverse colon somewhere where it looked to be completely smaller in size compared to the other side. So I decided to divide the colon at this area. First, the mesentery of the colon was taken again with the LigaSure Impact. I stayed as close as possible to the colon because I do not want to injure first the ureter and I do not want to have a large blood vessel bleeding. So at this point when I reached the transverse colon, I divided with a ERIC, and the whole segment of the descending colon and the splenic flexure was sent for the permanent pathology. At this point, the patient was still stable as before, still she was of course in septic shock on 2 pressors, but she did not get worse, so I decided that it is time to do a colostomy and to close the fascia. So at this point, first I marked an area where the colostomy would be. I made sure that there was no tension, and there was no ischemia to the stump of the colon. So at this point, I marked an area in the left upper quadrant, and I made an elliptical skin incision deep into the skin and subcutaneous tissue. The fascia was identified. It was divided in a cruciate fashion, and then, the muscle was divided and split, and then the posterior fascia was also opened using electrocautery, and then, a Larwence Judit was used to bring the colon up into the colostomy site. It was placed in good position, and there was no evidence of any tension from inside the abdomen, and there was no evidence of any ischemia.   So at this point, I did not fashion near the colostomy, but I went back into the abdomen, and I did multiple irrigations with more than 3 L of saline, and I irrigated all quadrants of the abdomen as well as the pelvis multiple times until we had only clear saline coming back. At this point, I placed the small bowel back into the abdomen, and the omentum was placed on top of the small bowel, the remaining part of the omentum, and at this point, the fascia was closed with a running #1 PDS from up and from below and every one inch or so, I took an interrupted #1-0 Vicryl to hold also the fascia in place, and at this point, because of the significant contamination in the abdomen, I knew that the patient is a very high risk of infection, so I decided to close very loosely the skin with skin stapler, but I also packed between the skin stapler with a vaginal pack, multiple areas with Betadine packing, and then after packing the area between the stapler, I put a 4 x 4 dressing and the Tegaderm. Then, at this point, attention was paid to the colostomy site. The staple line was opened, and the colostomy was fashioned with interrupted 3-0 Vicryl sutures. Again, the colostomy looked viable, and there was even some stool, and at this point, a colostomy bag was placed. The patient was still in critical condition, but she tolerated the procedure well, and she was transferred to the intensive care unit in a similar condition. Keila Huffman MD 
 
 
[id number] 
D:  03/01/2020 18:18 
T:  03/02/2020 3:52 JOB #:  [id number]

## 2020-03-02 NOTE — CONSULTS
Infectious Disease Consultation Note Reason: sepsis, gram negative bacteremia,  
 
Current abx Prior abx Pip/tazo since 3/1 Vancomycin since 2/29 Cefepime, metronidazole 2/29-3/1 Lines:  
 
 
Assessment : 
 
62 y.o. 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 Clinical presentation c/w septic shock (POA) due to gram negative bloodstream infection (positive blood culture 2/29), sigmoid perforation, secondary peritonitis s/p emergent Ex lap on 2/29/20. Intra operatively found to have sigmoid colon perforation, 2/2 constipation with large amount of stool burden spilling into the peritoneum per Dr. Manuel Mcnally. S/p Re-exploratory laparotomy, drainage of intra-abdominal stool collection, descending colectomy with mobilization of the splenic flexure, and transverse colostomy on 3/1. Significant pyuria on UA 2/29. Urine culture >100,000 gram negative rods. Unable to exclude cystitis with full certainty. Constipation would predispose to cystitis. Hence, would treat. Acute on chronic respiratory failure - likely due to sepsis. Sputum culture 3/1- gram positive cocci - likely colonizer. Will monitor. abx management complicated due to h/o pcn allergy. Currently tolerating pip/tazo without difficulties. Recommendations: 1. Recommend pip/tazo & monitor for allergic reaction. D/c vancomycin 2. Continue anidulafungin for now 3. Titrate pressors as tolerated 4. Monitor clinically. 5. Patient is at high risk for intra abdominal abscess. Thank you for consultation request. Above plan was discussed in details with dr. Aroldo Fuentes. Please call me if any further questions or concerns. Will continue to participate in the care of this patient. HPI: 
 
62 y.o. 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 Per chart, pt didn't have BM for 2 weeks PTA. Pt was initially placed on BiPAP in ED for respiratory distress. CT Chest/Abd/Pelvis showed intraperitoneal free air consistent with perforated viscus. ED w/o otherwise significant for lactic acidosis. pt was ultimately intubated in ED for worsening overall status and respiratory failure. Sepsis alert triggered and protocol initiated. Patient received Cefepime in the ED, along with 2 L NS bolus. BxCx sent. CVL placed in ED - R IJ. Pt was started on stress dose steroids. General Surgery was immediately consulted and pt was taken to OR for emergent Ex lap on 2/29/20. Patient presented to ICU on mechanical ventilation, s/p ex lap where patient was found to have sigmoid colon perforation, 2/2 constipation with large amount of stool burden spilling into the peritoneum per Dr. Karthik Kaba. S/p Re-exploratory laparotomy, drainage of intra-abdominal stool collection, descending colectomy with mobilization of the splenic flexure, and transverse colostomy on 3/1. Blood cultures 2/29- gram negative rods. Urine culture >100,000 gram negative rods. I have been consulted for further recommendations. I switched patient's abx to pip/tazo yesterday. No rash/allergic reaction noted. Past Medical History:  
Diagnosis Date  Abnormally low high density lipoprotein (HDL) cholesterol with hypertriglyceridemia Lipid profile (11/6/2016) showed , , HDL 38, LDL 37  Acute blood loss as cause of postoperative anemia 12/12/2019  Anxiety  Bipolar affective disorder ([address] 2012  Cellulitis of right forearm 2017  Chronic back pain  Chronic obstructive pulmonary disease (COPD) ([address] SOB, on [place] O2 Recent admission with psychosis  Chronic respiratory failure with hypoxia ([address] On home oxygen inhalation at 3 LPM via NC  
 Contusion of left elbow 2017  Depression  Diabetic neuropathy associated with type 2 diabetes mellitus ([address]  Diastolic dysfunction without heart failure Stable on diuretics  Falls frequently  Gastroesophageal reflux disease with hiatal hernia  Generalized osteoarthritis of multiple sites  Gout On Allopurinol  History of acute renal failure 2013  History of back injury   
 jumped out of second story window  History of fracture due to fall 2019  
 History of hepatitis C   
 treated  History of penicillin allergy  History of vitamin D deficiency 5/10/2017  Hypertensive heart disease without heart failure Better controlled  Hyperuricemia 2017  Hypothyroidism  Hypoxemia requiring supplemental oxygen 2014  Intravenous drug user 2017  Long term current use of aspirin  Memory difficulty  Menopause  Mixed connective tissue disease ([address] 5/10/2017  Obesity, Class I, BMI 30-34.9  Olecranon bursitis of right elbow 2017  Opioid dependence ([address] On Methadone  Recurrent genital herpes 2013  Right Achilles tendinitis 2017  Sarcoidosis  Sickle cell trait ([address]  Tobacco use disorder  Type 2 diabetes mellitus with diabetic neuropathy, with long-term current use of insulin ([address] HbA1c (2019) = 7.1  Urge urinary incontinence 5/10/2017  Venous insufficiency  Wears glasses Past Surgical History:  
Procedure Laterality Date Toño  HX  SECTION    
 HX CYST REMOVAL Left  Left foot  HX OTHER SURGICAL Left 04/17/2017 S/P incision and drainage of the abscess of the left hand and the left foot (4/17/2017 - Dr. Carisa Aggarwal. Gregor Ricketts)  HX THORACIC LAMINECTOMY  12/11/2019 S/P T10, T9, T8 laminectomy; T7, T8, T9, T10, T11, T12 posterior thoracic fusion; segmental spinal instrumentation; K2M Davis type, T7-T8, T11-T12 (12/11/2019 - Dr. Frandy Das)  HX TUBAL LIGATION    
 
 
home Medication List  
 Details  
roflumilast (DALIRESP) 250 mcg tab Take 250 mcg by mouth daily. Qty: 30 Tab, Refills: 0  
  
ARIPiprazole (ABILIFY) 10 mg tablet Take 1 Tab by mouth. aspirin-calcium carbonate 81 mg-300 mg calcium(777 mg) tab Take 1 Tab by mouth. divalproex DR (DEPAKOTE) 250 mg tablet Take 1 Tab by mouth. doxycycline (MONODOX) 100 mg capsule   
  
gabapentin (NEURONTIN) 300 mg capsule Take 1 Cap by mouth. ipratropium (ATROVENT) 0.02 % soln   
  
methylPREDNISolone (MEDROL DOSEPACK) 4 mg tablet   
  
oxyCODONE-acetaminophen (PERCOCET) 5-325 mg per tablet Take 1 Tab by mouth. tolterodine (DETROL) 1 mg tablet Take 1 Tab by mouth. traMADol (ULTRAM) 50 mg tablet Take 1 Tab by mouth. traZODone (DESYREL) 100 mg tablet Take 1 Tab by mouth. !! busPIRone (BUSPAR) 15 mg tablet Take 15 mg by mouth two (2) times a day. Indications: repeated episodes of anxiety Qty: 60 Tab, Refills: 2 OXYGEN-AIR DELIVERY SYSTEMS 3 L/min by Nasal route continuous. [employer] O2  
  
albuterol-ipratropium (DUO-NEB) 2.5 mg-0.5 mg/3 ml nebu 3 mL by Nebulization route every six (6) hours as needed for Wheezing. Qty: 30 Nebule, Refills: 1  
  
!! busPIRone (BUSPAR) 10 mg tablet Take 10 mg by mouth three (3) times daily. Indications: repeated episodes of anxiety  
  
cholecalciferol (VITAMIN D3) (1000 Units /25 mcg) tablet Take 1,000 Units by mouth two (2) times a day. levothyroxine (SYNTHROID) 100 mcg tablet Take 100 mcg by mouth Daily (before breakfast). insulin glargine (LANTUS U-100 INSULIN) 100 unit/mL injection 20 Units by SubCUTAneous route Daily (before breakfast). famotidine (PEPCID) 20 mg tablet Take 20 mg by mouth nightly. aspirin 81 mg chewable tablet Take 1 Tab by mouth daily (with breakfast). Indications: stroke prevention 
Qty: 30 Tab, Refills: 0  
  
docusate sodium (COLACE) 100 mg capsule Take 1 Cap by mouth daily (after breakfast). Indications: constipation 
Qty: 30 Cap, Refills: 0  
  
predniSONE (DELTASONE) 20 mg tablet Take 20 mg by mouth daily (with breakfast). Indications: sarcoidosis Qty: 30 Tab, Refills: 0 Associated Diagnoses: Sarcoidosis  
  
ascorbic acid, vitamin C, (VITAMIN C) 250 mg tablet Take 1 Tab by mouth daily (with breakfast). Qty: 30 Tab, Refills: 0 Associated Diagnoses: Acute blood loss as cause of postoperative anemia  
  
calcium citrate 200 mg (950 mg) tablet Take 1 Tab by mouth two (2) times a day. Indications: post-menopausal osteoporosis prevention 
Qty: 60 Tab, Refills: 0 Associated Diagnoses: Status post laminectomy with spinal fusion  
  
ferrous sulfate 325 mg (65 mg iron) tablet Take 1 Tab by mouth daily (with breakfast). Qty: 30 Tab, Refills: 0 Associated Diagnoses: Acute blood loss as cause of postoperative anemia  
  
acetaminophen (TYLENOL) 325 mg tablet Take 2 Tabs by mouth every four (4) hours as needed for Pain. Indications: pain 
Qty: 60 Tab, Refills: 0 Associated Diagnoses: Status post laminectomy with spinal fusion  
  
brief disposable (ADULT) misc by Does Not Apply route. Qty: 1 Package, Refills: 0 Associated Diagnoses: Urge urinary incontinence  
  
methadone (DOLOPHINE) 5 mg tablet Take 4 Tabs by mouth daily. Max Daily Amount: 20 mg. 
Qty: 10 Tab, Refills: 0 Associated Diagnoses: History of back injury  
  
polyethylene glycol (MIRALAX) 17 gram/dose powder Take 17 g by mouth daily. 1 tablespoon with 8 oz of water daily 
Qty: 289 g, Refills: 0 umeclidinium-vilanterol (ANORO ELLIPTA) 62.5-25 mcg/actuation inhaler Take 1 Puff by inhalation daily. Qty: 1 Inhaler, Refills: 0 BD INSULIN SYRINGE ULTRA-FINE 1 mL 31 gauge x 5/16 syrg   
  
rosuvastatin (CRESTOR) 5 mg tablet TAKE ONE TABLET NIGHTLY Qty: 90 Tab, Refills: 3  
  
albuterol (PROAIR HFA) 90 mcg/actuation inhaler INHALE 2 PUFFS EVERY 4 HOURS AS NEEDED FOR WHEEZING Qty: 1 Inhaler, Refills: 5  
  
oxybutynin (DITROPAN) 5 mg tablet Take 5 mg by mouth two (2) times a day. allopurinol (ZYLOPRIM) 100 mg tablet Take 100 mg by mouth daily. insulin lispro (HUMALOG) 100 unit/mL injection To be given 15 min before meals: < 150 - None, 151-200 - 2 u, 201-250 - 4 u, 251-300 - 6 u, 301-350 - 8 u, 351-400 - 10 u, >400 - 12 u Qty: 1 Vial, Refills: 0 Associated Diagnoses: Type 2 diabetes mellitus with stage 3 chronic kidney disease, with long-term current use of insulin ([facility])  
  
insulin syringe,safetyneedle 1 mL 30 gauge x 5/16\" syrg As directed Qty: 50 Each, Refills: 0 Nebulizer Accessories Kit Use with nebulizer machine Qty: 1 Kit, Refills: prn  
 Associated Diagnoses: COPD (chronic obstructive pulmonary disease) ([facility])  
  
sertraline (ZOLOFT) 50 mg tablet Take 50 mg by mouth daily. Indications: Bipolar Depression Current Facility-Administered Medications Medication Dose Route Frequency  calcium gluconate 2 g in 0.9% sodium chloride 100 mL IVPB  2 g IntraVENous ONCE  
 heparin (porcine) injection 5,000 Units  5,000 Units SubCUTAneous Q8H  
 vancomycin (VANCOCIN) 1250 mg in  ml infusion  1,250 mg IntraVENous Q18H  piperacillin-tazobactam (ZOSYN) 3.375 g in 0.9% sodium chloride (MBP/ADV) 100 mL MBP  3.375 g IntraVENous Q6H  
 diphenhydrAMINE (BENADRYL) injection 25 mg  25 mg IntraVENous Q4H PRN  
 sodium chloride (NS) flush 5-10 mL  5-10 mL IntraVENous PRN  chlorhexidine (PERIDEX) 0.12 % mouthwash 10 mL  10 mL Oral Q12H  electrolyte-r (NORMOSOL R) infusion   IntraVENous CONTINUOUS  
 sodium chloride (NS) flush 5-40 mL  5-40 mL IntraVENous PRN  pantoprazole (PROTONIX) 40 mg in 0.9% sodium chloride 10 mL injection  40 mg IntraVENous Q12H  
 fentaNYL (PF) 900 mcg/30 ml infusion soln  0-200 mcg/hr IntraVENous TITRATE  midazolam in normal saline (VERSED) 2 mg/mL infusion  1-10 mg/hr IntraVENous TITRATE  midazolam (VERSED) injection 1-2 mg  1-2 mg IntraVENous Q30MIN PRN  
 fentaNYL citrate (PF) injection  mcg   mcg IntraVENous Q30MIN PRN  
 hydrocortisone Sod Succ (PF) (SOLU-CORTEF) injection 50 mg  50 mg IntraVENous Q6H  
 albuterol-ipratropium (DUO-NEB) 2.5 MG-0.5 MG/3 ML  3 mL Nebulization Q4H RT  
 albuterol (ACCUNEB) nebulizer solution 1.25 mg  1.25 mg Nebulization Q6H PRN  
 budesonide (PULMICORT) 500 mcg/2 ml nebulizer suspension  500 mcg Nebulization BID RT  
 insulin lispro (HUMALOG) injection   SubCUTAneous Q6H  
 glucose chewable tablet 16 g  4 Tab Oral PRN  
 glucagon (GLUCAGEN) injection 1 mg  1 mg IntraMUSCular PRN  
 dextrose (D50W) injection syrg 12.5-25 g  25-50 mL IntraVENous PRN  
 anidulafungin (ERAXIS) 100 mg in 0.9% sodium chloride 130 mL IVPB  100 mg IntraVENous Q24H  
 NOREPINephrine (LEVOPHED) 32 mg in 5% dextrose 250 mL infusion  0.5-30 mcg/min IntraVENous TITRATE  PHENYLephrine (OLU-SYNEPHRINE) 90 mg in 0.9% sodium chloride 250 mL infusion   mcg/min IntraVENous TITRATE  vasopressin (VASOSTRICT) 20 Units in 0.9% sodium chloride 100 mL infusion  0.04 Units/min IntraVENous CONTINUOUS Allergies: Moxifloxacin; Pcn [penicillins]; and Sulfa (sulfonamide antibiotics) Family History Problem Relation Age of Onset  Seizures Other  Diabetes Mother  Hypertension Mother  Heart Disease Mother  Cancer Mother  Diabetes Father  Stroke Father  Heart Disease Father  Headache Sister  Depression Neg Hx  Suicide Neg Hx  Psychotic Disorder Neg Hx  Substance Abuse Neg Hx  Dementia Neg Hx Social History Socioeconomic History  Marital status: SINGLE Spouse name: Not on file  Number of children: Not on file  Years of education: Not on file  Highest education level: Not on file Occupational History  Occupation: Not employed Employer: NOT EMPLOYED Social Needs  Financial resource strain: Not on file  Food insecurity:  
  Worry: Not on file Inability: Not on file  Transportation needs:  
  Medical: Not on file Non-medical: Not on file Tobacco Use  Smoking status: Current Every Day Smoker Packs/day: 1.00 Years: 30.00 Pack years: 30.00 Types: Cigarettes Start date: 12/2/1969  Smokeless tobacco: Never Used Substance and Sexual Activity  Alcohol use: No  
 Drug use: No  
  Types: Cocaine, Heroin Comment: +Cocaine Screen 2013  Sexual activity: Not on file Comment: 2014 Lifestyle  Physical activity:  
  Days per week: Not on file Minutes per session: Not on file  Stress: Not on file Relationships  Social connections:  
  Talks on phone: Not on file Gets together: Not on file Attends Gnosticism service: Not on file Active member of club or organization: Not on file Attends meetings of clubs or organizations: Not on file Relationship status: Not on file  Intimate partner violence:  
  Fear of current or ex partner: Not on file Emotionally abused: Not on file Physically abused: Not on file Forced sexual activity: Not on file Other Topics Concern  Not on file Social History Narrative Lives with 15year old son. Social History Tobacco Use Smoking Status Current Every Day Smoker  Packs/day: 1.00  Years: 30.00  Pack years: 30.00  Types: Cigarettes  Start date: 12/2/1969 Smokeless Tobacco Never Used Temp (24hrs), Av.8 °F (37.7 °C), Min:96.8 °F (36 °C), Max:103.4 °F (39.7 °C) Visit Vitals /71 Pulse (!) 112 Temp 99.7 °F (37.6 °C) Resp 25 Ht 5' 4\" (1.626 m) Wt 85.6 kg (188 lb 11.4 oz) LMP 2012 (Exact Date) SpO2 96% BMI 32.39 kg/m² ROS: unable to obtain Physical Exam: 
 
General:  Intubated/Sedated, NAD Head:  Normocephalic, without obvious abnormality, atraumatic. Eyes:  Conjunctivae/corneas clear. PERRL, Nose: Nares normal. Septum midline. Mucosa normal. No drainage or sinus tenderness. Throat: Lips, mucosa, and tongue normal. Teeth and gums normal.  
Neck: Supple, symmetrical, trachea midline, no adenopathy, no carotid bruit and no JVD. Lungs:   Symmetrical chest rise; good AE bilat; course throughout; no wheezes/rales noted. Heart:  RRR, S1, S2 normal, no m/r/g Abdomen:   Soft, appropriately tender. Distended. Non-rigid. +midline surgical incision, dressing c/d/i. MOHINDER drain to wall suction with mild sanguinous output. Absent bowel sounds. No masses,  No organomegaly. Extremities: Extremities normal, atraumatic, no cyanosis or edema. Pulses: 2+ and symmetric all extremities. Skin: Skin color, texture, turgor normal. No rashes or lesions Neurologic: Sedated Labs: Results:  
Chemistry Recent Labs 20 
0410 20 
0420 20 
1500 GLU 95 137* 138*  140 138  
K 4.3 4.0 3.1*  
* 110 109 CO2 21 20* 21 BUN 17 18 19* CREA 1.02 1.19 1.40* CA 7.1* 7.1* 7.0* AGAP 8 10 8 BUCR 17 15 14 AP 72 66 63  
TP 4.9* 5.2* 5.1* ALB 2.2* 2.3* 2.5*  
GLOB 2.7 2.9 2.6 AGRAT 0.8 0.8 1.0  
  
CBC w/Diff Recent Labs 20 
0410 20 
0420 20 
1500 WBC 20.8* 17.5* 10.5 RBC 3.93* 4.42 4.18* HGB 10.7* 12.2 11.4* HCT 32.7* 37.3 36.0  182 153 GRANS 73 17* 39* LYMPH 2* 1* 10* EOS 0 0 0 Microbiology Recent Labs 20 
6867 20 
1208 20 
2622 20 
0250 CULT PENDING >100,000 COLONIES/mL GRAM NEGATIVE RODS* ANAEROBIC BOTTLE ANAEROBIC GRAM NEGATIVE RODS* ANAEROBIC BOTTLE ANAEROBIC GRAM NEGATIVE RODS* RADIOLOGY: 
 
All available imaging studies/reports in [facility] for this admission were reviewed Dr. Italo Abreu, Infectious Disease Specialist 
[phone] March 2, 2020 
8:38 AM

## 2020-03-02 NOTE — ROUTINE PROCESS
Bedside and Verbal shift change report given to Carmella Enciso (oncoming nurse) by Raúl Case (offgoing nurse). Report included the following information SBAR, MAR and Recent Results.

## 2020-03-02 NOTE — ROUTINE PROCESS
0730 Bedside and Verbal shift change report given to Nadya Cannon (oncoming nurse) by LILI Haji RN (offgoing nurse). Report included the following information SBAR, Kardex, MAR and Recent Results.

## 2020-03-02 NOTE — PROGRESS NOTES
Spoke with Nolan Angeles RN re: Q 4 hr LA due at 2400- RN stated LA has been drawn. Will monitor for results

## 2020-03-02 NOTE — PROGRESS NOTES
Reason for Admission:   Septic shock ([facility] [A41.9, R65.21] Perforated viscus [R19.8] RRAT Score:     61% Resources/supports as identified by patient/family:   Patient has family supports and she has personal care aides ([address]). Top Challenges facing patient (as identified by patient/family and CM):     NONE Finances/Medication cost?   NO Transportation      Family vs. Medicaid transport. Support system or lack thereof? Family and personal care services. Living arrangements? Lives alone; however, she has personal care services and family close by. Self-care/ADLs/Cognition? Currently intubated. Current Advanced Directive/Advance Care Plan:   no 
                       
Plan for utilizing home health:   Patient is active with [facility]. Patient will need resumption of home health orders. She is also active with personal care services ([address] [facility]). Likelihood of readmission:   HIGH Transition of Care Plan:   Patient will return home with help from her family and restart her home health and personal care services. Family vs. Medicaid transport, to transport home. Initial assessment completed with patient's daughter Thierry Esparza). Cognitive status of patient: Patient is currently on the vent. Face sheet information confirmed:  yes. Patient's daughter Thierry Esparza) and patient's brother Ratna Gramajo) will participate in her discharge plan and to receive any needed information. This patient lives in a house, alone. Patient is not able to navigate steps as needed. Prior to hospitalization, patient was considered to be independent with ADLs/IADLS : no . If not independent,  patient needs assist with : ADLs and IADLs.  
 
Patient has a current ACP document on file: no  
 
Patient's family will be available to transport patient home upon discharg, if she can safely transport by car. If not, she will need Medicaid transport. The patient already has a wheelchair and a bedside commode, available in the home. Patient is currently active with home health. If active, agency name is [address] Patient has stayed in a skilled nursing facility or rehab. She went to acute rehab. This patient is on dialysis :no 
 
Freedom of choice signed: yes, for [address] Currently, the discharge plan is to return home with help from her family and restart her home health services and her personal care aide services. Her transport is family vs. Medicaid transport. Patient's daughter is (Janet Patton, #[phone]). Patient's brother is (Maikel Aguilar, FB#[phone]). Patient's PCP is Pelon Medina NP. She is insured through [employer]. The patient states that she can obtain her medications from the pharmacy, and take her medications as directed. This writer will continue to monitor for discharge planning to ensure a safe discharge home from [facility]. Care Management Interventions PCP Verified by CM: Kami Dawson NP) Palliative Care Criteria Met (RRAT>21 & CHF Dx)?: No 
Mode of Transport at Discharge: Other (see comment)(Family willing to transport if patient can safely transport via car. If not, [facility] transport.) Transition of Care Consult (CM Consult): Discharge Planning, Home Health [facility] - INPATIENT: Yes Current Support Network: Has Personal Caregivers, Lives Alone, Family Lives [place] Confirm Follow Up Transport: Family The Plan for Transition of Care is Related to the Following Treatment Goals : Home with home health ([facility]) and personal care services ([address] [place] [address]) The Patient and/or Patient Representative was Provided with a Choice of Provider and Agrees with the Discharge Plan?: Yes 
 Freedom of Choice List was Provided with Basic Dialogue that Supports the Patient's Individualized Plan of Care/Goals, Treatment Preferences and Shares the Quality Data Associated with the Providers?: Yes Discharge Location Discharge Placement: Home with home health Agnes Mohamud. MSW Care Manager Pager#: [phone]

## 2020-03-02 NOTE — DIABETES MGMT
GLYCEMIC CONTROL PLAN OF CARE Assessment/Recommendations: 
Lab glucose this am 95 mg/dl Noted pt receiving steroids Corrective insulin coverage ordered as needed Will continue inpatient monitoring. Most recent blood glucose values: 
Results for Emiliano Gamble (MRN [id number]) as of 3/2/2020 10:36 Ref. Range 3/1/2020 12:02 3/1/2020 18:46 3/2/2020 00:21 3/2/2020 05:15 GLUCOSE,FAST - POC Latest Ref Range: 70 - 110 mg/dL 99 93 100 103 Current A1C of 8.2 % is equivalent to average blood glucose of 189_mg/dl over the past 2-3 months. Current hospital diabetes medications:  
Lispro corrective insulin coverage every 6 hours as needed Previous day's insulin requirements:  
Lispro 2 units corrective insulin Home diabetes medications:  Per pta med list 
Lantus 20 units daily before breakfast 
Humalog AC based on her blood sugar reading Diet:   
NPO Education:  ____Refer to Diabetes Education Record  
          _x__Education not indicated at this time 
intubated Red Miranda RN CDE Ext [phone]

## 2020-03-02 NOTE — ROUTINE PROCESS
1000 Patient has about 8 episodes of Stach to SVT that last about 30 seconds each. EKG done twice confirmed sinus tachycardia and the second one was  aflutter. Made aware to Dr Samantha Moss and Lala POWELL. Patient is restless and seems to be in pain. Fentanyl drip increased and Propofol gtt started. Held precedex. 1330 Digoxin was given and few minutes later HR went to 170's. Labs are being drawn. No other orders received.

## 2020-03-02 NOTE — PROGRESS NOTES
*ATTENTION:  This note has been created by a medical student for educational purposes only. Please do not refer to the content of this note for clinical decision-making, billing, or other purposes. Please see attending physicians note to obtain clinical information on this patient. * [facility]. Pulmonary, Critical Care, and Sleep Medicine Name: Miesha Bush MRN: [id number] : 1955 Hospital: [facility] Date: 3/2/2020  Admission Date: 2020 Chart and notes reviewed. Data reviewed. I have evaluated all findings. [x]I have reviewed the flowsheet and previous days notes. [x]The patient is unable to give any meaningful history or review of systems because the patient is: 
[x]Intubated [x]Sedated  
[]Unresponsive [x]The patient is critically ill on     
[x]Mechanical ventilation [x]Pressors []BiPAP [] Interval HPI: 
No acute events overnight. Pt with repeated (approx 8) runs of SVT this morning between 9-12. EKG showed atrial flutter with variable AV block. Give digoxin 250 mcg for control. Pt was awake and nodded that she was ok but having pain in her belly. Appeared anxious/ uncomfortable. Pt also with rising WBC (10.5>17.5>20.8) over past 3 days consistent with previously noted bandemia. Subjective 20 Hospital Day: 3 Vent Day: 2 Overnight events: TRANG Mentation/Activity: awake- tracking with eyes and nodding appropriately to questions prior to sedation and pain control Respiratory/ Secretions: mechanical ventilation Hemodynamics: stable on pressors Urine output, bowel: small volume raphael blood in ostomy bag Diet: NPO Need for procedures: none ROS:Review of systems not obtained due to patient factors. Events and notes from last 24 hours reviewed. Care plan discussed on multidisciplinary rounds. Patient Active Problem List  
Diagnosis Code  Diabetic neuropathy associated with type 2 diabetes mellitus ([facility]) E11.40  Venous insufficiency I87.2  Obesity, Class I, BMI 30-34.9 E66.9  Chronic back pain M54.9, G89.29  
 Generalized osteoarthritis of multiple sites M15.9  Bipolar affective disorder ([facility] F31.9  Abnormally low high density lipoprotein (HDL) cholesterol with hypertriglyceridemia E78.6, E78.1  Diastolic dysfunction without heart failure I51.89  Chronic obstructive pulmonary disease (COPD) ([facility] J44.9  Hypertensive heart disease without heart failure I11.9  Depression F32.9  History of back injury Z87.828  Type 2 diabetes mellitus with diabetic neuropathy, with long-term current use of insulin ([facility]) E11.40, Z79.4  Anxiety F41.9  Wears glasses Z97.3  History of hepatitis C Z86.19  
 Sickle cell trait ([facility]) D57.3  History of acute renal failure Z87.448  Hypothyroidism E03.9  Recurrent genital herpes A60.00  Sarcoidosis D86.9  Abscess of right arm L02.413  Hypoxemia requiring supplemental oxygen R09.02, Z99.81  
 Abnormal nuclear stress test R94.39  
 Cellulitis and abscess of hand L03.119, L02.519  
 Cellulitis and abscess of foot L03.119, L02.619  
 Cellulitis of arm, right L03. 113  
 Olecranon bursitis of right elbow M70.21  
 Contusion of left elbow S50. 02XA  Intravenous drug user F19.90  Right Achilles tendinitis M76.61  
 History of penicillin allergy Z88.0  Falls frequently R29.6  Gastroesophageal reflux disease with hiatal hernia K21.9, K44.9  Memory difficulty R41.3  Mixed connective tissue disease ([facility] M35.1  Impaired mobility and ADLs Z74.09  
 Hyperuricemia E79.0  History of vitamin D deficiency Z86.39  
 Urge urinary incontinence N39.41  Spinal cord compression ([facility]) G95.20  Compression fracture of body of thoracic vertebra ([facility]) S22.000A  Status post laminectomy with spinal fusion Z98.1  Acute blood loss as cause of postoperative anemia D62  
 History of fracture due to fall Z87.81  Chronic respiratory failure with hypoxia ([facility]) J96.11  
 Cellulitis of right forearm L03. 113  
 Gout M10.9  Menopause Z78.0  Long term current use of aspirin Z79.82  
 Opioid dependence ([facility]) F11.20  Tobacco use disorder F17.200  Sepsis ([address] A41.9  Perforated viscus R19.8  Septic shock ([facility]) A41.9, R65.21 Vital Signs: 
Visit Vitals /67 Pulse (!) 113 Temp 100 °F (37.8 °C) Resp 23 Ht 5' 4\" (1.626 m) Wt 85.6 kg (188 lb 11.4 oz) LMP 2012 (Exact Date) SpO2 96% BMI 32.39 kg/m² O2 Device: Ventilator Temp (24hrs), Av.2 °F (37.3 °C), Min:96.8 °F (36 °C), Max:100 °F (37.8 °C) Intake/Output:  
Last shift:       07 -  1900 In: 120 [I.V.:120] Out: 700 [Urine:700] Last 3 shifts: 1901 -  0700 In: 4962.2 [I.V.:4852.2] Out: 1331 [Urine:4700; Drains:400] Intake/Output Summary (Last 24 hours) at 3/2/2020 1401 Last data filed at 3/2/2020 1345 Gross per 24 hour Intake 4127.7 ml Output 4770 ml Net -642.3 ml Ventilator Settings: 
Ventilator Mode: Pressure support Respiratory Rate Resp Rate Observed: 27 Back-Up Rate: 16 Insp Time (sec): 1 sec I:E Ratio: 1;2.6 Ventilator Volumes Vt Set (ml): 450 ml Vt Exhaled (Machine Breath) (ml): 480 ml Vt Spont (ml): 507 ml Ve Observed (l/min): 12.5 l/min Ventilator Pressures Pressure Support (cm H2O): 7 cm H2O 
PIP Observed (cm H2O): 14 cm H2O Plateau Pressure (cm H2O): 16 cm H2O 
MAP (cm H2O): 8 PEEP/VENT (cm H2O): 5 cm H20 Auto PEEP Observed (cm H2O): 0.1 cm H2O Current Facility-Administered Medications Medication Dose Route Frequency  heparin (porcine) injection 5,000 Units  5,000 Units SubCUTAneous Q8H  
 dexmedeTOMidine (PRECEDEX) 400 mcg in 0.9% sodium chloride 100 mL infusion  0.2-0.7 mcg/kg/hr IntraVENous TITRATE  propofol (DIPRIVAN) 10 mg/mL infusion  0-50 mcg/kg/min IntraVENous TITRATE  [START ON 3/3/2020] Vancomycin trough 3/3 @1530  1 Each Other ONCE  
 vancomycin (VANCOCIN) 1250 mg in  ml infusion  1,250 mg IntraVENous Q18H  piperacillin-tazobactam (ZOSYN) 3.375 g in 0.9% sodium chloride (MBP/ADV) 100 mL MBP  3.375 g IntraVENous Q6H  
 chlorhexidine (PERIDEX) 0.12 % mouthwash 10 mL  10 mL Oral Q12H  electrolyte-r (NORMOSOL R) infusion   IntraVENous CONTINUOUS  
 pantoprazole (PROTONIX) 40 mg in 0.9% sodium chloride 10 mL injection  40 mg IntraVENous Q12H  
 fentaNYL (PF) 900 mcg/30 ml infusion soln  0-200 mcg/hr IntraVENous TITRATE  midazolam in normal saline (VERSED) 2 mg/mL infusion  1-10 mg/hr IntraVENous TITRATE  hydrocortisone Sod Succ (PF) (SOLU-CORTEF) injection 50 mg  50 mg IntraVENous Q6H  
 albuterol-ipratropium (DUO-NEB) 2.5 MG-0.5 MG/3 ML  3 mL Nebulization Q4H RT  
 budesonide (PULMICORT) 500 mcg/2 ml nebulizer suspension  500 mcg Nebulization BID RT  
 insulin lispro (HUMALOG) injection   SubCUTAneous Q6H  
 anidulafungin (ERAXIS) 100 mg in 0.9% sodium chloride 130 mL IVPB  100 mg IntraVENous Q24H  
 NOREPINephrine (LEVOPHED) 32 mg in 5% dextrose 250 mL infusion  0.5-30 mcg/min IntraVENous TITRATE  PHENYLephrine (OLU-SYNEPHRINE) 90 mg in 0.9% sodium chloride 250 mL infusion   mcg/min IntraVENous TITRATE  vasopressin (VASOSTRICT) 20 Units in 0.9% sodium chloride 100 mL infusion  0.04 Units/min IntraVENous CONTINUOUS Telemetry: []Sinus []A-flutter []Paced []A-fib []Multiple PVCs Physical Exam:  
  
General: Intubated/sedated; HEENT:  mucosa moist 
Resp:  Symmetrical chest expansion, no accessory muscle use;  no rales/ wheezing/ rhonchi noted CV:  S1, S2 present; regular rhythm, tachycardic GI:  Abdomen soft & distended, absent bowel sounds. Ostomy bag noted with small volume bloody output. Extremities:  +2 pulses on all extremities; mod pitting edema bilateral LL up to hip. Severe bilateral edema in the hands. b/l wrist restraints- not too tight, able to get 1-2 fingers under restraint. Skin:  Warm; no rashes/ lesions noted, normal turgor/cap refill Neurologic:  sedated Devices:  NGT/OGT, Central line/ PICC 
 
 
DATA: 
MAR reviewed and pertinent medications noted or modified as needed Labs: 
Recent Labs 03/02/20 0410 03/01/20 
0420 02/29/20 
1500 WBC 20.8* 17.5* 10.5 HGB 10.7* 12.2 11.4* HCT 32.7* 37.3 36.0  182 153 Recent Labs 03/02/20 0410 03/01/20 
0420 02/29/20 
1500  140 138  
K 4.3 4.0 3.1*  
* 110 109 CO2 21 20* 21  
GLU 95 137* 138* BUN 17 18 19* CREA 1.02 1.19 1.40* CA 7.1* 7.1* 7.0*  
MG 2.0 2.0 1.5* PHOS 4.3 3.3 4.7 ALB 2.2* 2.3* 2.5* SGOT 44* 30 18 ALT 13 12* 11* INR  --   --  1.4* No results for input(s): PH, PCO2, PO2, HCO3, FIO2 in the last 72 hours. Recent Labs 03/02/20 
0411 03/01/20 
0456 02/29/20 
1500 FIO2I 60 60 0.80 HCO3I 21.1* 20.9* 20.1* PCO2I 33.9* 45.1* 47.1* PHI 7.406 7.285* 7.238* PO2I 87 125* 86 Imaging: 
[x]   I have personally reviewed the patients radiographs and reports XR Results (most recent): 
Results from [facility] [place] encounter on 02/29/20 XR CHEST PORT Narrative EXAM:  Chest Portable. INDICATION:  Respiratory failure. Check ET tube placement. COMPARISON:  03/01/20. TECHNIQUE:  Portable AP chest study FINDINGS:  
  
- ET tube:  ET tube distal tip at 3.6 cm above the alyssa. 
- Enteric tube:  NG/OG tube placement. Distal tip courses below the inferior 
margin of the field of view well beyond the level of the diaphragmatic hiatus. - Central access catheter:  Right IJ central line placement, with the distal tip 
in the superior vena cava at or just below the level of the alyssa. - Aeration pattern:  Fairly dense streaky and hazy opacities are noted in the 
mid to lower lung zones bilaterally obscuring the diaphragmatic outlines stable 
to mildly increased compared to 03/01/20. 
- Cardiomediastinal silhouette:  No significant cardiac silhouette enlargement. - Pleura:  No pneumothorax. Impression IMPRESSION: 
 
1.  ET tube, NG/OG tube and right IJ central line in place as described. 2.  Mid to lower lung zone opacities bilaterally, stable to slightly increased 
in extent compared to 03/01/20. CT Results (most recent): 
Results from [place] Encounter encounter on 02/29/20 CT CHEST ABD PELV W CONT Narrative CT SCAN OF THE CHEST, ABDOMEN AND PELVIS, WITH CONTRAST INDICATION: Above. Arrived with shortness of breath and abdominal pain. Septic 
shock. Hypoxia. History of sarcoidosis/COPD. Altered. Abdominal distention and 
tympani. Intraperitoneal free air on chest radiograph. Perforated viscus. COMPARISON: Chest CT 1/21/2010. No prior abdominopelvic CT in PACS. Report is 
noted in the electronic medical record (care everywhere) of previous noncontrast 
enhanced abdominopelvic CT performed elsewhere 7/21/2014. TECHNIQUE: With IV administration of 70 mL Isovue-300, without administration of 
oral contrast, helically acquired serial axial CT images through the chest, 
abdomen and pelvis were obtained. Additional coronal and sagittal reformation 
images were also performed. All CT scans at this facility are performed using dose optimization technique as 
appropriate to the performed exam, to include automated exposure control, 
adjustment of the mA and/or kV according to patient's size (Including 
appropriate matching for site-specific examinations), or use of iterative 
reconstruction technique. FINDINGS: 
 
CT chest:  
 
Endotracheal tube tip is in place cephalad to the level of the alyssa. Esophagogastric tube is noted, tip in the mid stomach. Severe pulmonary emphysematous disease again seen with extensive bullous changes 
most conspicuous in the upper lungs. Scattered scarring. There has been interval 
development of consolidative appearing lung opacities in the lower lobes 
bilaterally consistent with airspace disease likely in addition to atelectasis. The small stable subpleural pulmonary nodule described in the right lower lobe 
on the recent chest CT is again seen, slightly better visualized on the current 
study measuring approximately 4 mm, unchanged compared to the CT of 2/6/2018 
(axial 34). Multifocal chronic nodular pleuro-parenchymal scarring in the left 
upper lobe without significant interval change compared to the preceding chest 
CT or study of 2/6/2018. No definite suspicious new pulmonary nodule/mass identified compared to the 
preceding CT. No evidence of pathologic lymph node enlargement is seen. No evidence of pleural or significant pericardial effusion. CT abdomen/pelvis:  
 
Small ascites, best seen around the liver and in the anterior pelvis adjacent to 
the distal descending and sigmoid colon. The spleen is heterogeneous in attenuation and demonstrates several rounded 
hypodensities as detailed on the recent chest CT of 1/21/2020. Indeterminate attenuation masses in the kidneys bilaterally, 2.7 cm right kidney 
upper to midpole laterally, 2.1 cm left kidney interpolar region posteriorly. Further evaluation recommended, beginning with ultrasound might be helpful when 
clinically feasible if the corresponding lesions can be identified 
sonographically. Additional smaller subcentimeter renal hypodensities 
bilaterally, possibly cysts, too small to be specifically characterized. The liver, gallbladder, pancreas, and adrenal glands appear unremarkable. Scattered calcifications in the abdominal aorta and its major branches. The 
abdominal aorta enhances normally without abdominal aortic aneurysm. A few scattered small subcentimeter short axis lymph nodes bilaterally in the 
pelvis or retroperitoneum. No evidence of pathologic lymph node enlargement is 
seen. Moderate quantity of intraperitoneal free air in the nondependent anterior 
abdomen, consistent with perforated viscus. Additional scattered foci of free 
air elsewhere in the peritoneum and mesentery, including in the right upper 
quadrant near the gallbladder/duodenum, and scattered bilaterally in the 
mesenteric/peritoneal fat of the upper and lower abdomen. Grouped small foci of 
intraperitoneal free air are noted close to the colonic wall along the 
left/anterior side of the sigmoid colon (reference axial 112-119). The left 
hemicolon is diffusely mildly distended to the rectum containing gas, stool, and 
hyperdense material. Clinical correlation might be helpful regarding recent 
ingestion of hyperdense material. The rectum measures approximately 8.5 cm in 
caliber. The sigmoid measures approximately 7.3 cm in caliber on axial image 115. The site of the or free viscus is uncertain. Common sites could include 
duodenal or gastric perforation such as may be seen due to perforated ulcer. However, there is small focal hypoattenuation along the wall of the sigmoid 
colon on series 2 axial images 111-113 which could potentially reflect a small 
mural defect such as could be seen related to constipation, stercoral colitis, 
diverticular disease. The appendix is seen within the ascites in the right lower quadrant, assessment 
for stranding in the periappendiceal fat limited by the ascites, without gross 
abnormal thickening of the appendix seen. No gas within the appendix. The right 
hemicolon appears grossly unremarkable. There is mild diffuse mural thickening of small bowel loops and mild prominence 
of enhancement, nonspecific. No definite associated pneumatosis. No portal 
venous gas is seen. The SMA/SMV appear to maintain usual orientation. Broadly 
speaking differential considerations could include inflammatory, infectious, 
ischemic. Clinical correlation is recommended including with lactate 
measurement. Conceivably sequela of peritonitis in the setting of bowel 
perforation? Uterus is present. Small gas is noted within the uterus and vagina, nonspecific, 
can be seen as a physiologic finding. On review of bone windows, there is a superior endplate compression fracture 
with mildly to moderately decreased vertebral body height at L2. Bilateral 
spinal fusion rods spanning from J9-C1-I62-T12. Severe compression fracture 
deformity again seen at T9. Compression fracture with rather pronounced 
decreased vertebral body height again seen at T5. Mild endplate indentations at 
several other levels including T4, T6, T10. The posterior midline fluid 
collection described on the preceding study was better visualized on the 
preceding study, extensive metallic hardware artifacts noted. Impression Impression: Moderate quantity of intraperitoneal free air. Findings are consistent with 
perforated viscus. The site of perforation is uncertain, as discussed above, 
possibly the sigmoid colon where there is apparent small subtle focal 
hypoattenuation along the wall as detailed above (reference axial images 111-113) close to region where several foci of free air are grouped. The left 
hemicolon is mildly distended and filled with hyperdense appearing stool. Mild diffuse mural thickening and enhancement of the small bowel as described. Small volume of ascites best seen around the liver and in the pelvis adjacent to 
the sigmoid. Bibasilar consolidations, right greater than left, suspicious for airspace 
disease/pneumonia. Indeterminate attenuation bilateral renal masses, follow-up evaluation 
recommended. Splenic hypodensities again seen as described on recent chest CT. Intubated. Severe pulmonary emphysematous disease and fibrotic changes. Multiple otherwise nonacute findings as detailed above. I have discussed these results directly with Dr. Philly Harris in the ED at 12:20 p.m. 
in addition to the patient's consulted surgeon at 12:25 PM. IMPRESSION:  
· Patient Active Problem List  
Diagnosis Code  Diabetic neuropathy associated with type 2 diabetes mellitus ([facility]) E11.40  Venous insufficiency I87.2  Obesity, Class I, BMI 30-34.9 E66.9  Chronic back pain M54.9, G89.29  
 Generalized osteoarthritis of multiple sites M15.9  Bipolar affective disorder ([facility] F31.9  Abnormally low high density lipoprotein (HDL) cholesterol with hypertriglyceridemia E78.6, E78.1  Diastolic dysfunction without heart failure I51.89  Chronic obstructive pulmonary disease (COPD) ([facility] J44.9  Hypertensive heart disease without heart failure I11.9  Depression F32.9  History of back injury Z87.828  Type 2 diabetes mellitus with diabetic neuropathy, with long-term current use of insulin ([facility]) E11.40, Z79.4  Anxiety F41.9  Wears glasses Z97.3  History of hepatitis C Z86.19  
 Sickle cell trait ([facility]) D57.3  History of acute renal failure Z87.448  Hypothyroidism E03.9  Recurrent genital herpes A60.00  Sarcoidosis D86.9  Abscess of right arm L02.413  Hypoxemia requiring supplemental oxygen R09.02, Z99.81  
 Abnormal nuclear stress test R94.39  
 Cellulitis and abscess of hand L03.119, L02.519  
 Cellulitis and abscess of foot L03.119, L02.619  
 Cellulitis of arm, right L03. 113  
 Olecranon bursitis of right elbow M70.21  
 Contusion of left elbow S50. 02XA  Intravenous drug user F19.90  Right Achilles tendinitis M76.61  
 History of penicillin allergy Z88.0  Falls frequently R29.6  Gastroesophageal reflux disease with hiatal hernia K21.9, K44.9  Memory difficulty R41.3  Mixed connective tissue disease ([facility] M35.1  Impaired mobility and ADLs Z74.09  
 Hyperuricemia E79.0  History of vitamin D deficiency Z86.39  
 Urge urinary incontinence N39.41  Spinal cord compression ([facility]) G95.20  Compression fracture of body of thoracic vertebra ([facility]) S22.000A  Status post laminectomy with spinal fusion Z98.1  Acute blood loss as cause of postoperative anemia D62  
 History of fracture due to fall Z87.81  Chronic respiratory failure with hypoxia ([facility]) J96.11  
 Cellulitis of right forearm L03. 113  
 Gout M10.9  Menopause Z78.0  Long term current use of aspirin Z79.82  
 Opioid dependence ([facility]) F11.20  Tobacco use disorder F17.200  Sepsis ([facility] A41.9  Perforated viscus R19.8  Septic shock ([facility]) A41.9, R65.21  
 
  
RECOMMENDATIONS:  
Neuro: Fentanyl and Versed gtt and PRN for sedation. Anticipate pt will have high opoid tolerance - noted to be on Methadone (home dose of 25mg/day) at home and hx of IVDA. Titrate sedation for RASS goal 0 to -1, daily sedation holiday. Bilateral soft wrist restraints for pt safety while intubated Pulm: Aspiration precautions, HOB>30'. VAP Bundle Titrate FiO2 O2 for SpO2 >94%; optimize bronchial hygiene. Duo-nebs q4 and Albuterol nebs q6 PRN. Pulmicort nebs BID. Daily CXR and ABG while intubated CVS:Monitor HD, MAP goal >65. GI: NPO. Not appropriate for enteral nutrition at this time per surgery. Monitor output from MOHINDER drain. Renal: Trend Cr, UOP. Renae. Continue UTI coverage with Vanc and Zosyn Hem/Onc: Trend H/H, monitor for s/o active bleeding. Daily CBC. Check CBC and PT/INR post-op. I/D:Trend WBCs and temperature curve. Sepsis bundle per hospital protocol, f/u BxCx/UC- Currently growin  GNR, Trend LA q4hrs until normal. Con't Zosyn and Vanc. Add Eraxis for antifungal prophylaxis as pt is at high risk for fungemia with bowel perforation. Metabolic: Daily BMP, mag, phos. Trend lytes and replace per protocol. Endocrine:Q6 glucoses. SSI. Avoid hypoglycemia. Musc/Skin:  wound care, PT/OT/SLP Full Code Discussed in interdisciplinary rounds Best Practices/Safety Bundles: 
· Sepsis Bundle per Hospital Protocol · Glycemic control; avoid Hypoglycemia · IHI ICU Bundles: 
·      Central Line Bundle Followed , Rousseau Bundle Followed and Vent Bundle Followed, Vent Day 1   
· [facility] Vent patients/ Pulmonary pts:  
· VAP bundle, Aim to keep peak plateau pressure 18-22BM H2O 
· Aspiration Precautions - HOB >30' · Daily sedation holiday as indicated · SBT as tolerated/appropriate · Stress ulcer prophylaxis: Protonix · DVT prophylaxis: SCD's - Avoid chemical prophylaxis as pt will be returning to OR in AM. · Need for Lines, rousseau assessed. · Restraints need. High complexity decision making was performed during this consultation and evaluation. [x]       Pt is at high risk for further organ failure and dysfunction. Critical care time spent:    minutes with patient exclusive of procedures. Satira Saint, PA-C 
03/02/20 Pulmonary, Critical Care Medicine [facility]

## 2020-03-02 NOTE — PROGRESS NOTES
attended the interdisciplinary rounds for Beatriz Hdz, who is a 62 y.o.,female. Patients Primary Language is: [place]. According to the patients EMR Ep[employer]opalian Affiliation is: Hu Arellano. The reason the Patient came to the hospital is:  
Patient Active Problem List  
 Diagnosis Date Noted  Bipolar affective disorder ([address] 12/05/2012 Priority: 1 - One  Perforated viscus 02/29/2020  Septic shock ([address] 02/29/2020  Sepsis ([address] 01/17/2020  Chronic respiratory failure with hypoxia ([address]  Gout  Menopause  Long term current use of aspirin  Opioid dependence ([address]  Tobacco use disorder  Acute blood loss as cause of postoperative anemia 12/12/2019  Impaired mobility and ADLs 12/11/2019  Spinal cord compression ([address] 12/11/2019  Compression fracture of body of thoracic vertebra (HCC) 12/11/2019  Status post laminectomy with spinal fusion 12/11/2019  
 History of fracture due to fall 12/11/2019  Hyperuricemia 05/26/2017  Mixed connective tissue disease ([address] 05/10/2017  History of vitamin D deficiency 05/10/2017  Urge urinary incontinence 05/10/2017  History of penicillin allergy  Falls frequently  Gastroesophageal reflux disease with hiatal hernia  Memory difficulty  Cellulitis of arm, right 05/04/2017  Olecranon bursitis of right elbow 05/04/2017  Contusion of left elbow 05/04/2017  Cellulitis of right forearm 05/04/2017  Intravenous drug user 05/02/2017  Right Achilles tendinitis 05/02/2017  Cellulitis and abscess of hand 04/13/2017  Cellulitis and abscess of foot 04/13/2017  Abnormal nuclear stress test 11/17/2016  Hypoxemia requiring supplemental oxygen 12/29/2014  Abscess of right arm 06/03/2013  History of acute renal failure 05/31/2013  Recurrent genital herpes 05/31/2013  Hypothyroidism  Sarcoidosis  Diastolic dysfunction without heart failure  Chronic obstructive pulmonary disease (COPD) ([address]  Hypertensive heart disease without heart failure  Depression  History of back injury  Type 2 diabetes mellitus with diabetic neuropathy, with long-term current use of insulin ([address]  Anxiety  Wears glasses  History of hepatitis C   
 Sickle cell trait ([address]  Abnormally low high density lipoprotein (HDL) cholesterol with hypertriglyceridemia  Generalized osteoarthritis of multiple sites  Diabetic neuropathy associated with type 2 diabetes mellitus ([address]  Venous insufficiency  Obesity, Class I, BMI 30-34.9  Chronic back pain Plan: 
Chaplains will continue to follow and will provide pastoral care on an as needed/requested basis.  recommends bedside caregivers page  on duty if patient shows signs of acute spiritual or emotional distress. [address] [facility] [place] [facility]  
[phone]

## 2020-03-02 NOTE — PROGRESS NOTES
Physical Therapy Screening: 
Services are not indicated at this time. Pt currently intubated and not appropriate for PT but once pt is medically stable may benefit from PT (order when medically appropriate). An InBasket screening referral was triggered for physical therapy based on results obtained during the nursing admission assessment. The patients chart was reviewed and the patient is not appropriate for a skilled therapy evaluation at this time. Please consult physical therapy if any therapy needs arise. Thank you.  
 
Joselyn Radford, PT

## 2020-03-02 NOTE — PROGRESS NOTES
Inpatient Daily Progress Note Subjective:  
Vanzola A Roots  Pain is well controlled. has not had nausea has not vomiting has not passed flatus has not had a bowel movement Objective:  
 
 
Physical Exam: 
Visit Vitals /60 Pulse 87 Temp 100.2 °F (37.9 °C) Resp 20 Ht 5' 4\" (1.626 m) Wt 85.6 kg (188 lb 11.4 oz) LMP 11/25/2012 (Exact Date) SpO2 97% BMI 32.39 kg/m² Recent Results (from the past 12 hour(s)) LACTIC ACID Collection Time: 03/02/20  9:30 AM  
Result Value Ref Range Lactic acid 2.5 (HH) 0.4 - 2.0 MMOL/L  
EKG, 12 LEAD, SUBSEQUENT Collection Time: 03/02/20  9:57 AM  
Result Value Ref Range Ventricular Rate 128 BPM  
 Atrial Rate 128 BPM  
 P-R Interval 126 ms  
 QRS Duration 72 ms Q-T Interval 314 ms QTC Calculation (Bezet) 458 ms Calculated P Axis 55 degrees Calculated R Axis 17 degrees Calculated T Axis 53 degrees Diagnosis Sinus tachycardia with premature atrial complexes Nonspecific T wave abnormality Abnormal ECG When compared with ECG of 29-FEB-2020 08:57, 
Significant changes have occurred Confirmed by Reyes Lev MD, --- ([id number]) on 3/2/2020 2:16:00 PM 
  
GLUCOSE, POC Collection Time: 03/02/20 11:48 AM  
Result Value Ref Range Glucose (POC) 102 70 - 110 mg/dL EKG, 12 LEAD, SUBSEQUENT Collection Time: 03/02/20 12:14 PM  
Result Value Ref Range Ventricular Rate 174 BPM  
 Atrial Rate 330 BPM  
 QRS Duration 66 ms  
 Q-T Interval 256 ms  
 QTC Calculation (Bezet) 435 ms Calculated R Axis 8 degrees Calculated T Axis -98 degrees Diagnosis Atrial flutter with variable AV block vs afib Low voltage QRS Nonspecific ST and T wave abnormality Abnormal ECG When compared with ECG of 02-MAR-2020 09:57, Atrial flutter has replaced Sinus rhythm Non-specific change in ST segment in Inferior leads ST now depressed in Anterior leads Confirmed by Reyes Lev MD, --- ([id number]) on 3/2/2020 2:24:11 PM 
  
 CARDIAC PANEL,(CK, CKMB & TROPONIN) Collection Time: 03/02/20  2:20 PM  
Result Value Ref Range  (H) 26 - 192 U/L  
 CK - MB 2.2 <3.6 ng/ml CK-MB Index 0.8 0.0 - 4.0 % Troponin-I, QT 0.19 (H) 0.0 - 0.045 NG/ML  
LACTIC ACID Collection Time: 03/02/20  2:20 PM  
Result Value Ref Range Lactic acid 2.1 (HH) 0.4 - 2.0 MMOL/L  
METABOLIC PANEL, COMPREHENSIVE Collection Time: 03/02/20  2:20 PM  
Result Value Ref Range Sodium 145 136 - 145 mmol/L Potassium 4.0 3.5 - 5.5 mmol/L Chloride 114 (H) 100 - 111 mmol/L  
 CO2 22 21 - 32 mmol/L Anion gap 9 3.0 - 18 mmol/L Glucose 101 (H) 74 - 99 mg/dL BUN 18 7.0 - 18 MG/DL Creatinine 0.97 0.6 - 1.3 MG/DL  
 BUN/Creatinine ratio 19 12 - 20 GFR est AA >60 >60 ml/min/1.73m2 GFR est non-AA 58 (L) >60 ml/min/1.73m2 Calcium 7.3 (L) 8.5 - 10.1 MG/DL Bilirubin, total 1.0 0.2 - 1.0 MG/DL  
 ALT (SGPT) 16 13 - 56 U/L  
 AST (SGOT) 44 (H) 10 - 38 U/L Alk. phosphatase 73 45 - 117 U/L Protein, total 4.3 (L) 6.4 - 8.2 g/dL Albumin 2.0 (L) 3.4 - 5.0 g/dL Globulin 2.3 2.0 - 4.0 g/dL A-G Ratio 0.9 0.8 - 1.7 MAGNESIUM Collection Time: 03/02/20  2:20 PM  
Result Value Ref Range Magnesium 2.2 1.6 - 2.6 mg/dL PHOSPHORUS Collection Time: 03/02/20  2:20 PM  
Result Value Ref Range Phosphorus 3.7 2.5 - 4.9 MG/DL  
CALCIUM, IONIZED Collection Time: 03/02/20  2:20 PM  
Result Value Ref Range Ionized Calcium 1.03 (L) 1.12 - 1.32 MMOL/L  
GLUCOSE, POC Collection Time: 03/02/20  4:37 PM  
Result Value Ref Range Glucose (POC) 138 (H) 70 - 110 mg/dL Assessment:  
 
Ilda Tipton is a 62 y.o. female  S/P sigmoid resection for perforation. Physical Exam:   
 
General:  in no apparent distress. He is sedated on ventilator Eyes:  negative, conjunctivae and sclerae normal, pupils equal, round, reactive to light Throat & Neck: no erythema or exudates noted and neck supple and symmetrical; no palpable masses Lungs:   clear to auscultation bilaterally,  On ventilator Heart:  Regular rate and rhythm or Abdomen:   rounded, soft, nontender, nondistended, no masses or organomegaly 
 ,  Wound dressing changed. Wound oK. Ostomy with some serosanguinous drainage. Looks ok Extremities: extremities normal, atraumatic, no cyanosis or edema Skin: Normal.  
   
Plan:  
 
-Continue current medications Change dressing daily. Continue to pack wound. Moisten wound. Continue intensive care.

## 2020-03-03 NOTE — ROUTINE PROCESS
Bedside and Verbal shift change report given to [address] (oncoming nurse) by Mohinder Curiel RN 
 (offgoing nurse). Report given with SBAR, Kardex, Intake/Output, MAR, Accordion and Recent Results.

## 2020-03-03 NOTE — PROGRESS NOTES
VENTILATOR CARE PLAN Problem: Ventilator Management Goal: *Adequate oxygenation/ ventilation/ and extubation Patient: 
   
 
Chapin Sinha     62 y.o.   female     3/3/2020  2:06 PM 
Patient Active Problem List  
Diagnosis Code  Diabetic neuropathy associated with type 2 diabetes mellitus ([facility]) E11.40  Venous insufficiency I87.2  Obesity, Class I, BMI 30-34.9 E66.9  Chronic back pain M54.9, G89.29  
 Generalized osteoarthritis of multiple sites M15.9  Bipolar affective disorder ([facility] F31.9  Abnormally low high density lipoprotein (HDL) cholesterol with hypertriglyceridemia E78.6, E78.1  Diastolic dysfunction without heart failure I51.89  Chronic obstructive pulmonary disease (COPD) ([facility] J44.9  Hypertensive heart disease without heart failure I11.9  Depression F32.9  History of back injury Z87.828  Type 2 diabetes mellitus with diabetic neuropathy, with long-term current use of insulin ([facility]) E11.40, Z79.4  Anxiety F41.9  Wears glasses Z97.3  History of hepatitis C Z86.19  
 Sickle cell trait ([facility]) D57.3  History of acute renal failure Z87.448  Hypothyroidism E03.9  Recurrent genital herpes A60.00  Sarcoidosis D86.9  Abscess of right arm L02.413  Hypoxemia requiring supplemental oxygen R09.02, Z99.81  
 Abnormal nuclear stress test R94.39  
 Cellulitis and abscess of hand L03.119, L02.519  
 Cellulitis and abscess of foot L03.119, L02.619  
 Cellulitis of arm, right L03. 113  
 Olecranon bursitis of right elbow M70.21  
 Contusion of left elbow S50. 02XA  Intravenous drug user F19.90  Right Achilles tendinitis M76.61  
 History of penicillin allergy Z88.0  Falls frequently R29.6  Gastroesophageal reflux disease with hiatal hernia K21.9, K44.9  Memory difficulty R41.3  Mixed connective tissue disease ([facility] M35.1  Impaired mobility and ADLs Z74.09  
 Hyperuricemia E79.0  History of vitamin D deficiency Z86.39  
  Urge urinary incontinence N39.41  Spinal cord compression ([facility]) G95.20  Compression fracture of body of thoracic vertebra ([facility]) S22.000A  Status post laminectomy with spinal fusion Z98.1  Acute blood loss as cause of postoperative anemia D62  
 History of fracture due to fall Z87.81  Chronic respiratory failure with hypoxia ([facility]) J96.11  
 Cellulitis of right forearm L03. 113  
 Gout M10.9  Menopause Z78.0  Long term current use of aspirin Z79.82  
 Opioid dependence ([facility]) F11.20  Tobacco use disorder F17.200  Sepsis ([address] A41.9  Perforated viscus R19.8  Septic shock (HCC) A41.9, R65.21 Septic shock ([address] [A41.9, R65.21] Perforated viscus [R19.8] Reason patient intubated: septic shock, perforated viscus Ventilator day: 4 Ventilator settings: 450/16/8/55 ETT Size/Placement: 7.5 @ 23 @ lip Wean Screen Pass (Yes or No): no sbt done. Patient too unstable. Wean Screen Reason for Failure: 
Duration of Weaning Trial: 
Additional Comments: PLAN OF CARE: continue with sbt when patient is more stable. GOAL: 
 
 
OUTCOME:  
 
ABG: 
Date:3/3/2020 Lab Results Component Value Date/Time PHI 7.367 03/03/2020 10:18 AM  
 PCO2I 45.4 (H) 03/03/2020 10:18 AM  
 PO2I 96 03/03/2020 10:18 AM  
 HCO3I 25.8 03/03/2020 10:18 AM  
 FIO2I 60 03/03/2020 10:18 AM  
 
 
Chest X-ray: 
Date:3/3/2020 Results from [facility] Encounter encounter on 02/29/20 XR CHEST PORT Narrative CHEST PORTABLE 0453 hours CPT CODE: [id number] COMPARISON: March 2. INDICATIONS: Intubated FINDINGS:  
 
Portable single view chest demonstrates: 
 
Lungs: Diffuse pulmonary parenchymal opacities are again noted. Bullous changes 
are evident in the right upper lung. Cardiac Silhouette And Mediastinal Contours: Moderately enlarged for the 
severity of chronic pulmonary disease. Pleural Spaces: No pneumothorax or definite pleural effusion evident. Bones And Soft Tissues: Unremarkable for age. Endotracheal tube is present with the tip 4 cm above the alyssa. Right IJ CVL 
and nasogastric tube are in satisfactory position Impression IMPRESSION: 
 
Radiographically stable and unchanged cardiopulmonary appearance. Sheryl Cortes Lab Test: 
LQAM:2/0/3150 WBC:  
Lab Results Component Value Date/Time WBC 26.1 (H) 03/03/2020 04:40 AM  
HGB:  
Lab Results Component Value Date/Time HGB 9.5 (L) 03/03/2020 04:40 AM  
 PLTS:  
Lab Results Component Value Date/Time PLATELET 236 57/41/8337 04:40 AM  
 
 
 
Vital Signs:    
Patient Vitals for the past 8 hrs: 
 Temp Pulse Resp BP SpO2  
03/03/20 1331     99 % 03/03/20 1324  80 16  97 % 03/03/20 1300 (!) 100.7 °F (38.2 °C) 81 16 118/60 97 % 03/03/20 1230 100.4 °F (38 °C) 84 16 116/59 97 % 03/03/20 1200 100.1 °F (37.8 °C) 88 16 97/53 96 % 03/03/20 1130 (!) 100.6 °F (38.1 °C) 81 16 146/58 96 % 03/03/20 1100 100.4 °F (38 °C)  14  97 % 03/03/20 1030 100.4 °F (38 °C)  14  97 % 03/03/20 1000 100.3 °F (37.9 °C)  16  97 % 03/03/20 0930 100 °F (37.8 °C)  15  97 % 03/03/20 0908  88 18  97 % 03/03/20 0900 99.9 °F (37.7 °C) 88 17 118/60 97 % 03/03/20 0830 100.2 °F (37.9 °C) 82 15 131/65 96 % 03/03/20 0800 100.2 °F (37.9 °C) 83 14 138/67 96 % 03/03/20 0730 100 °F (37.8 °C) 85 15 149/64 96 % 03/03/20 0700 99.8 °F (37.7 °C) 84 15 147/72 96 % 03/03/20 0630 99.7 °F (37.6 °C) 81 15 126/68 97 %

## 2020-03-03 NOTE — PROGRESS NOTES
Inpatient Daily Progress Note Subjective:  
Vanzola A Roots  Pain is well controlled. has not had nausea has not vomiting has not passed flatus has not had a bowel movement Objective:  
 
 
Physical Exam: 
Visit Vitals /65 Pulse 88 Temp 100.2 °F (37.9 °C) Resp 18 Ht 5' 4\" (1.626 m) Wt 87.2 kg (192 lb 3.9 oz) LMP 11/25/2012 (Exact Date) SpO2 97% BMI 33.00 kg/m² Recent Results (from the past 12 hour(s)) GLUCOSE, POC Collection Time: 03/02/20 11:29 PM  
Result Value Ref Range Glucose (POC) 196 (H) 70 - 110 mg/dL POC G3 Collection Time: 03/03/20  4:27 AM  
Result Value Ref Range Device: VENT    
 FIO2 (POC) 60 % pH (POC) 7.331 (L) 7.35 - 7.45    
 pCO2 (POC) 42.6 35.0 - 45.0 MMHG  
 pO2 (POC) 73 (L) 80 - 100 MMHG  
 HCO3 (POC) 22.4 22 - 26 MMOL/L  
 sO2 (POC) 93 92 - 97 % Base deficit (POC) 3 mmol/L Mode ASSIST CONTROL Tidal volume 450 ml Set Rate 16 bpm  
 PEEP/CPAP (POC) 5 cmH2O Allens test (POC) N/A Inspiratory Time 1 sec Total resp. rate 16 Site DRAWN FROM ARTERIAL LINE Patient temp. 99.1 Specimen type (POC) ARTERIAL Performed by Pamela Bey Volume control plus YES    
MAGNESIUM Collection Time: 03/03/20  4:40 AM  
Result Value Ref Range Magnesium 2.1 1.6 - 2.6 mg/dL PHOSPHORUS Collection Time: 03/03/20  4:40 AM  
Result Value Ref Range Phosphorus 3.5 2.5 - 4.9 MG/DL  
CBC WITH AUTOMATED DIFF Collection Time: 03/03/20  4:40 AM  
Result Value Ref Range WBC 26.1 (H) 4.6 - 13.2 K/uL  
 RBC 3.39 (L) 4.20 - 5.30 M/uL HGB 9.5 (L) 12.0 - 16.0 g/dL HCT 28.6 (L) 35.0 - 45.0 % MCV 84.4 74.0 - 97.0 FL  
 MCH 28.0 24.0 - 34.0 PG  
 MCHC 33.2 31.0 - 37.0 g/dL  
 RDW 16.6 (H) 11.6 - 14.5 % PLATELET 320 404 - 062 K/uL MPV 10.8 9.2 - 11.8 FL  
 NEUTROPHILS 70 42 - 75 % BAND NEUTROPHILS 26 (H) 0 - 5 % LYMPHOCYTES 3 (L) 20 - 51 % MONOCYTES 1 (L) 2 - 9 % EOSINOPHILS 0 0 - 5 % BASOPHILS 0 0 - 3 % NRBC 1.0 (H) 0  WBC  
 ABS. NEUTROPHILS 25.0 (H) 1.8 - 8.0 K/UL  
 ABS. LYMPHOCYTES 0.8 0.8 - 3.5 K/UL  
 ABS. MONOCYTES 0.3 0 - 1.0 K/UL  
 ABS. EOSINOPHILS 0.0 0.0 - 0.4 K/UL  
 ABS. BASOPHILS 0.0 0.0 - 0.06 K/UL  
 DF MANUAL PLATELET COMMENTS DECREASED PLATELETS    
 RBC COMMENTS ANISOCYTOSIS 1+ 
    
 RBC COMMENTS POLYCHROMASIA 1+ 
    
 RBC COMMENTS HYPOCHROMIA 1+ 
    
 RBC COMMENTS POIKILOCYTOSIS 
2+ METABOLIC PANEL, COMPREHENSIVE Collection Time: 03/03/20  4:40 AM  
Result Value Ref Range Sodium 143 136 - 145 mmol/L Potassium 3.9 3.5 - 5.5 mmol/L Chloride 112 (H) 100 - 111 mmol/L  
 CO2 24 21 - 32 mmol/L Anion gap 7 3.0 - 18 mmol/L Glucose 168 (H) 74 - 99 mg/dL BUN 18 7.0 - 18 MG/DL Creatinine 0.97 0.6 - 1.3 MG/DL  
 BUN/Creatinine ratio 19 12 - 20 GFR est AA >60 >60 ml/min/1.73m2 GFR est non-AA 58 (L) >60 ml/min/1.73m2 Calcium 7.4 (L) 8.5 - 10.1 MG/DL Bilirubin, total 1.2 (H) 0.2 - 1.0 MG/DL  
 ALT (SGPT) 21 13 - 56 U/L  
 AST (SGOT) 55 (H) 10 - 38 U/L Alk. phosphatase 125 (H) 45 - 117 U/L Protein, total 5.1 (L) 6.4 - 8.2 g/dL Albumin 1.7 (L) 3.4 - 5.0 g/dL Globulin 3.4 2.0 - 4.0 g/dL A-G Ratio 0.5 (L) 0.8 - 1.7 GLUCOSE, POC Collection Time: 03/03/20  6:21 AM  
Result Value Ref Range Glucose (POC) 199 (H) 70 - 110 mg/dL POC G3 Collection Time: 03/03/20 10:18 AM  
Result Value Ref Range Device: VENT    
 FIO2 (POC) 60 % pH (POC) 7.367 7.35 - 7.45    
 pCO2 (POC) 45.4 (H) 35.0 - 45.0 MMHG  
 pO2 (POC) 96 80 - 100 MMHG  
 HCO3 (POC) 25.8 22 - 26 MMOL/L  
 sO2 (POC) 97 92 - 97 % Base excess (POC) 1 mmol/L Mode ASSIST CONTROL Tidal volume 450 ml Set Rate 16 bpm  
 PEEP/CPAP (POC) 8 cmH2O  
 PIP (POC) 16 Allens test (POC) N/A Inspiratory Time 1 sec Total resp. rate 20 Site DRAWN FROM ARTERIAL LINE Patient temp. 100.4  Specimen type (POC) ARTERIAL    
 Performed by Rosy Peerra Volume control plus YES    
GLUCOSE, POC Collection Time: 03/03/20 10:49 AM  
Result Value Ref Range Glucose (POC) 200 (H) 70 - 110 mg/dL Assessment:  
 
Kaylene Clark is a 62 y.o. female presented with perforated viscous requiring sigmoid resection, and colostomy Physical Exam:   
 
General:  in no apparent distress  ,  But patient heavily sedated Eyes:  Negative,pt sedated with eyes closed Throat & Neck: no erythema or exudates noted and neck supple and symmetrical; no palpable masses Lungs:   Decreased BS. On vent. Required elevation of PEEP. Sat worse this morning,  Infiltrate in lung ? Pneumonia or ARDS. Filling pressure steady at the moment. O2 sat ok now Heart:  Regular rate and rhythm or but on 2 pressors, and required amiodarione for tachy arrhythmia Abdomen:   {Exam; abdomen inspection,  Non distended. No BS. Wound clean no sign of inifection. Ostomy viable,  No out put yet Extremities: extremities normal, atraumatic, no cyanosis or edema, no edema Skin: Normal.  
   
Plan:  
 
-Continue current medications WBC continues to increase. If the trend continues and we don't have another reason may need a CT. Lungs with infiltrate, increase PEEP,  ? ARDS or pneumonia Heart on pressors and arrhythmia treatment. Sepsis Abdom. Seems ok but may be source of ongoing sepsis DVT prophylaxes on heparin, could go to lovenox. Probably still too earaly to start TPN but nutrition will be an issue soon. She is really sick with out making a turn yet. The process and progress is ominous.   
Kenyatta Huitron MD

## 2020-03-03 NOTE — PROGRESS NOTES
attended the interdisciplinary rounds for Apryl Medrano, who is a 62 y.o.,female. Patients Primary Language is: [place]. According to the patients EMR Christian Affiliation is: [place]. The reason the Patient came to the hospital is:  
Patient Active Problem List  
 Diagnosis Date Noted  Bipolar affective disorder ([address] 12/05/2012 Priority: 1 - One  Perforated viscus 02/29/2020  Septic shock ([address] 02/29/2020  Sepsis ([address] 01/17/2020  Chronic respiratory failure with hypoxia ([address]  Gout  Menopause  Long term current use of aspirin  Opioid dependence ([address]  Tobacco use disorder  Acute blood loss as cause of postoperative anemia 12/12/2019  Impaired mobility and ADLs 12/11/2019  Spinal cord compression ([address] 12/11/2019  Compression fracture of body of thoracic vertebra (HCC) 12/11/2019  Status post laminectomy with spinal fusion 12/11/2019  
 History of fracture due to fall 12/11/2019  Hyperuricemia 05/26/2017  Mixed connective tissue disease ([address] 05/10/2017  History of vitamin D deficiency 05/10/2017  Urge urinary incontinence 05/10/2017  History of penicillin allergy  Falls frequently  Gastroesophageal reflux disease with hiatal hernia  Memory difficulty  Cellulitis of arm, right 05/04/2017  Olecranon bursitis of right elbow 05/04/2017  Contusion of left elbow 05/04/2017  Cellulitis of right forearm 05/04/2017  Intravenous drug user 05/02/2017  Right Achilles tendinitis 05/02/2017  Cellulitis and abscess of hand 04/13/2017  Cellulitis and abscess of foot 04/13/2017  Abnormal nuclear stress test 11/17/2016  Hypoxemia requiring supplemental oxygen 12/29/2014  Abscess of right arm 06/03/2013  History of acute renal failure 05/31/2013  Recurrent genital herpes 05/31/2013  Hypothyroidism  Sarcoidosis  Diastolic dysfunction without heart failure  Chronic obstructive pulmonary disease (COPD) ([address]  Hypertensive heart disease without heart failure  Depression  History of back injury  Type 2 diabetes mellitus with diabetic neuropathy, with long-term current use of insulin ([address]  Anxiety  Wears glasses  History of hepatitis C   
 Sickle cell trait ([address]  Abnormally low high density lipoprotein (HDL) cholesterol with hypertriglyceridemia  Generalized osteoarthritis of multiple sites  Diabetic neuropathy associated with type 2 diabetes mellitus ([address]  Venous insufficiency  Obesity, Class I, BMI 30-34.9  Chronic back pain Plan: 
Chaplains will continue to follow and will provide pastoral care on an as needed/requested basis.  recommends bedside caregivers page  on duty if patient shows signs of acute spiritual or emotional distress. [address] Certified [facility]  
[phone]

## 2020-03-03 NOTE — PROGRESS NOTES
[facility]. Pulmonary, Critical Care, and Sleep Medicine Name: Sandi Amos MRN: [id number] : 1955 Hospital: [facility] Date: 3/3/2020  Admission Date: 2020 Chart and notes reviewed. Data reviewed. I have evaluated all findings. [x]I have reviewed the flowsheet and previous days notes. [x]The patient is unable to give any meaningful history or review of systems because the patient is: 
[x]Intubated [x]Sedated  
[]Unresponsive [x]The patient is critically ill on      
[x]Mechanical ventilation [x]Pressors []BiPAP [] Interval HPI: 
Patient is a 62 y.o. female with PMH of COPD (on 3LPM NC home O2), Sarcoidosis, HTN, T2DM,  Hep C, recent Spine Thoracic Laminectomy T6-T11 with Fusion (20) 2/2 compression fx of T9-T10 and spinal cord compression at T9 and T10 and spinal cord edema with subsequent paraparesis, who presented to [facility] ED on 20 c/o acute abdominal pain, SOB with associated constipation x4-5 days PTA, likely 2/2 aforementioned spinal surgery on 19. CT Chest/Abd/Pelvis showed intraperitoneal free air consistent with perforated viscus. Pt was initially on BiPAP but ultimately intubated in ED for worsening overall status and respiratory failure. Sepsis protocol initiated. General Surgery took pt for emergent ex lap. Pt presented to ICU on mechanical ventilation, s/p ex lap where patient was found to have sigmoid colon perforation, 2/2 constipation w/ large stool burden spilling into the peritoneum per Dr. Kulwinder Ledbetter. Pt received Albumin and pressors intraoperatively. Pt arrived to ICU on Levophed at 20mcg/min. Pt was left w/ an open abdomen, and Dr. Kulwinder Ledbetter took pt back to OR on 3/1 for abdominal washout and closure. Yesterday pt with several ~30-second episodes of Sinus tach to SVT with rates as high as the 200s. EKGs showed sinus tach then Aflutter.  Pt awake and uncomfortable-appearing, pain control was increased and her RASS goal was escalated to -3 to -4. Continued with the tachy episodes while on increased sedation. Digoxin unsuccessful, subsequently required Amiodarone bolus to gtt. Once on the drip, pt became hypotensive and required escalation of pressors. Pt has tolerated weaning of pressors thus far today. Patient noted to have an area of ecchymosis around her right upper arm PIV that required US placement. Area noted to be under the BP cuff. Line was saline flushed and drawback was successful. Suspect ecchymosis d/t BP cuff overlying IV site, do not suspect site is infiltrated. Subjective 03/03/20 Hospital Day: 4 Vent Day: 3 Overnight events: No tachyarrhythmic episodes overnight. Mentation/Activity: Sedated, responsive to pain stimulus. Respiratory/ Secretions: Intubated on mechanical ventilation. RN reported yellow-white trach secretions. Hemodynamics: Vasopressors increased yesterday d/t hypotension after Amio gtt was initiated. No changes overnight. Urine output, bowel: Small volume raphael blood in ostomy bag. Diet: NPO 
           
ROS:Review of systems not obtained due to patient factors. Events and notes from last 24 hours reviewed. Care plan discussed on multidisciplinary rounds. Patient Active Problem List  
Diagnosis Code  Diabetic neuropathy associated with type 2 diabetes mellitus (HCC) E11.40  Venous insufficiency I87.2  Obesity, Class I, BMI 30-34.9 E66.9  Chronic back pain M54.9, G89.29  
 Generalized osteoarthritis of multiple sites M15.9  Bipolar affective disorder ([facility] [address] F31.9  Abnormally low high density lipoprotein (HDL) cholesterol with hypertriglyceridemia E78.6, E78.1  Diastolic dysfunction without heart failure I51.89  Chronic obstructive pulmonary disease (COPD) ([address] J44.9  Hypertensive heart disease without heart failure I11.9  Depression F32.9  History of back injury Z87.828  
  Type 2 diabetes mellitus with diabetic neuropathy, with long-term current use of insulin ([facility]) E11.40, Z79.4  Anxiety F41.9  Wears glasses Z97.3  History of hepatitis C Z86.19  
 Sickle cell trait ([facility]) D57.3  History of acute renal failure Z87.448  Hypothyroidism E03.9  Recurrent genital herpes A60.00  Sarcoidosis D86.9  Abscess of right arm L02.413  Hypoxemia requiring supplemental oxygen R09.02, Z99.81  
 Abnormal nuclear stress test R94.39  
 Cellulitis and abscess of hand L03.119, L02.519  
 Cellulitis and abscess of foot L03.119, L02.619  
 Cellulitis of arm, right L03. 113  
 Olecranon bursitis of right elbow M70.21  
 Contusion of left elbow S50. 02XA  Intravenous drug user F19.90  Right Achilles tendinitis M76.61  
 History of penicillin allergy Z88.0  Falls frequently R29.6  Gastroesophageal reflux disease with hiatal hernia K21.9, K44.9  Memory difficulty R41.3  Mixed connective tissue disease ([facility] [address] M35.1  Impaired mobility and ADLs Z74.09  
 Hyperuricemia E79.0  History of vitamin D deficiency Z86.39  
 Urge urinary incontinence N39.41  Spinal cord compression ([facility]) G95.20  Compression fracture of body of thoracic vertebra ([facility]) S22.000A  Status post laminectomy with spinal fusion Z98.1  Acute blood loss as cause of postoperative anemia D62  
 History of fracture due to fall Z87.81  Chronic respiratory failure with hypoxia ([facility]) J96.11  
 Cellulitis of right forearm L03. 113  
 Gout M10.9  Menopause Z78.0  Long term current use of aspirin Z79.82  
 Opioid dependence ([facility]) F11.20  Tobacco use disorder F17.200  Sepsis ([facility] [address] A41.9  Perforated viscus R19.8  Septic shock ([facility]) A41.9, R65.21 Vital Signs: 
Visit Vitals /58 Pulse 85 Temp (!) 100.9 °F (38.3 °C) Resp 16 Ht 5' 4\" (1.626 m) Wt 87.2 kg (192 lb 3.9 oz) LMP 11/25/2012 (Exact Date) SpO2 98% BMI 33.00 kg/m² O2 Device: Ventilator, Endotracheal tube Temp (24hrs), Av °F (37.8 °C), Min:99.2 °F (37.3 °C), Max:100.9 °F (38.3 °C) Intake/Output:  
Last shift:      No intake/output data recorded. Last 3 shifts:  1901 -  0700 In: 6413.5 [I.V.:6363.5] Out: 4500 [FTVAK:0996] Intake/Output Summary (Last 24 hours) at 3/3/2020 1437 Last data filed at 3/3/2020 0700 Gross per 24 hour Intake 4215.27 ml Output 1930 ml Net 2285.27 ml Ventilator Settings: 
Ventilator Mode: Assist control, VC+ Respiratory Rate Resp Rate Observed: 27 Back-Up Rate: 16 Insp Time (sec): 1.15 sec I:E Ratio: 1:2.2 Ventilator Volumes Vt Set (ml): 450 ml Vt Exhaled (Machine Breath) (ml): 518 ml Vt Spont (ml): 507 ml Ve Observed (l/min): 8.38 l/min Ventilator Pressures Pressure Support (cm H2O): 7 cm H2O 
PIP Observed (cm H2O): 17 cm H2O Plateau Pressure (cm H2O): 17 cm H2O 
MAP (cm H2O): 11 PEEP/VENT (cm H2O): 8 cm H20 Auto PEEP Observed (cm H2O): 0 cm H2O Current Facility-Administered Medications Medication Dose Route Frequency  piperacillin-tazobactam (ZOSYN) 4.5 g in 0.9% sodium chloride (MBP/ADV) 100 mL MBP  4.5 g IntraVENous Q6H  
 heparin (porcine) injection 5,000 Units  5,000 Units SubCUTAneous Q8H  
 dexmedeTOMidine (PRECEDEX) 400 mcg in 0.9% sodium chloride 100 mL infusion  0.2-0.7 mcg/kg/hr IntraVENous TITRATE  propofol (DIPRIVAN) 10 mg/mL infusion  0-50 mcg/kg/min IntraVENous TITRATE  amiodarone (NEXTERONE) 360 mg in dextrose 200 mL (1.8 mg/mL) infusion  0.5-1 mg/min IntraVENous TITRATE  chlorhexidine (PERIDEX) 0.12 % mouthwash 10 mL  10 mL Oral Q12H  electrolyte-r (NORMOSOL R) infusion   IntraVENous CONTINUOUS  
 pantoprazole (PROTONIX) 40 mg in 0.9% sodium chloride 10 mL injection  40 mg IntraVENous Q12H  
 fentaNYL (PF) 900 mcg/30 ml infusion soln  0-200 mcg/hr IntraVENous TITRATE  midazolam in normal saline (VERSED) 2 mg/mL infusion  1-10 mg/hr IntraVENous TITRATE  hydrocortisone Sod Succ (PF) (SOLU-CORTEF) injection 50 mg  50 mg IntraVENous Q6H  
 albuterol-ipratropium (DUO-NEB) 2.5 MG-0.5 MG/3 ML  3 mL Nebulization Q4H RT  
 budesonide (PULMICORT) 500 mcg/2 ml nebulizer suspension  500 mcg Nebulization BID RT  
 insulin lispro (HUMALOG) injection   SubCUTAneous Q6H  
 anidulafungin (ERAXIS) 100 mg in 0.9% sodium chloride 130 mL IVPB  100 mg IntraVENous Q24H  
 NOREPINephrine (LEVOPHED) 32 mg in 5% dextrose 250 mL infusion  0.5-30 mcg/min IntraVENous TITRATE  PHENYLephrine (OLU-SYNEPHRINE) 90 mg in 0.9% sodium chloride 250 mL infusion   mcg/min IntraVENous TITRATE  vasopressin (VASOSTRICT) 20 Units in 0.9% sodium chloride 100 mL infusion  0.04 Units/min IntraVENous CONTINUOUS Telemetry: [x]Sinus [x]A-flutter []Paced []A-fib []Multiple PVCs Physical Exam:  
  
General: Intubated, sedated, mildly uncomfortable appearing, but NAD HEENT:  PERRL, mucosa moist 
Resp:  Symmetrical chest expansion, no accessory muscle use;  no rales/ wheezes noted CV:  S1, S2 present; regular rate GI:  Midline laparotomy incision, covered by dry dressing. Abdomen distended but non-rigid; absent bowel sounds. Ostomy bag noted to have minimal bloody output. Extremities: +2 pulses on all extremities; trace edema bilateral lower extremities. Bilateral upper extremity edema. Skin: Warm; normal turgor/cap refill; pt with ecchymotic area to the right upper arm surrounding her PIV site, suspect ecchymosis d/t BP cuff; PIV is functional 
Neurologic:  Sedated, responsive to painful stimuli Devices:  ETT, NGT, Central line R IJ, L Radial Arterial Line, Reane DATA: 
MAR reviewed and pertinent medications noted or modified as needed Labs: 
Recent Labs 03/03/20 
5185 03/02/20 
0410 03/01/20 
3087 WBC 26.1* 20.8* 17.5* HGB 9.5* 10.7* 12.2 HCT 28.6* 32.7* 37.3  156 182 Recent Labs 03/03/20 
0440 03/02/20 1420 03/02/20 
0410  02/29/20 
1500  145 142   < > 138  
K 3.9 4.0 4.3   < > 3.1*  
* 114* 113*   < > 109 CO2 24 22 21   < > 21 * 101* 95   < > 138* BUN 18 18 17   < > 19* CREA 0.97 0.97 1.02   < > 1.40* CA 7.4* 7.3* 7.1*   < > 7.0*  
MG 2.1 2.2 2.0   < > 1.5* PHOS 3.5 3.7 4.3   < > 4.7 ALB 1.7* 2.0* 2.2*   < > 2.5* SGOT 55* 44* 44*   < > 18 ALT 21 16 13   < > 11* INR  --   --   --   --  1.4*  
 < > = values in this interval not displayed. No results for input(s): PH, PCO2, PO2, HCO3, FIO2 in the last 72 hours. Recent Labs 03/03/20 
1018 03/03/20 
0427 03/02/20 
0411 FIO2I 60 60 60 HCO3I 25.8 22.4 21.1*  
PCO2I 45.4* 42.6 33.9* PHI 7.367 7.331* 7.406 PO2I 96 73* 87 Imaging: 
[x]   I have personally reviewed the patients radiographs and reports XR Results (most recent): CXR Results  (Last 48 hours) 03/03/20 0623  XR CHEST PORT Final result Impression:  IMPRESSION:  
   
Radiographically stable and unchanged cardiopulmonary appearance. Bryce Velásquez Narrative:  CHEST PORTABLE 0453 hours CPT CODE: [id number] COMPARISON: March 2. INDICATIONS: Intubated FINDINGS:   
   
Portable single view chest demonstrates:  
   
Lungs: Diffuse pulmonary parenchymal opacities are again noted. Bullous changes  
are evident in the right upper lung. Cardiac Silhouette And Mediastinal Contours: Moderately enlarged for the  
severity of chronic pulmonary disease. Pleural Spaces: No pneumothorax or definite pleural effusion evident. Bones And Soft Tissues: Unremarkable for age. Endotracheal tube is present with the tip 4 cm above the alyssa. Right IJ CVL  
and nasogastric tube are in satisfactory position 03/02/20 0557  XR CHEST PORT Final result Impression:  IMPRESSION:  
   
1.  ET tube, NG/OG tube and right IJ central line in place as described. 2.  Mid to lower lung zone opacities bilaterally, stable to slightly increased  
in extent compared to 03/01/20. Narrative:  EXAM:  Chest Portable. INDICATION:  Respiratory failure. Check ET tube placement. COMPARISON:  03/01/20. TECHNIQUE:  Portable AP chest study FINDINGS:   
   
- ET tube:  ET tube distal tip at 3.6 cm above the alyssa.  
- Enteric tube:  NG/OG tube placement. Distal tip courses below the inferior  
margin of the field of view well beyond the level of the diaphragmatic hiatus. - Central access catheter:  Right IJ central line placement, with the distal tip  
in the superior vena cava at or just below the level of the alyssa.  
- Aeration pattern:  Fairly dense streaky and hazy opacities are noted in the  
mid to lower lung zones bilaterally obscuring the diaphragmatic outlines stable  
to mildly increased compared to 03/01/20.  
- Cardiomediastinal silhouette:  No significant cardiac silhouette enlargement. - Pleura:  No pneumothorax. CT Results (most recent): 
Results from [facility] [place] on 02/29/20 CT CHEST ABD PELV W CONT Narrative CT SCAN OF THE CHEST, ABDOMEN AND PELVIS, WITH CONTRAST INDICATION: Above. Arrived with shortness of breath and abdominal pain. Septic 
shock. Hypoxia. History of sarcoidosis/COPD. Altered. Abdominal distention and 
tympani. Intraperitoneal free air on chest radiograph. Perforated viscus. COMPARISON: Chest CT 1/21/2010. No prior abdominopelvic CT in PACS. Report is 
noted in the electronic medical record (care everywhere) of previous noncontrast 
enhanced abdominopelvic CT performed elsewhere 7/21/2014. TECHNIQUE: With IV administration of 70 mL Isovue-300, without administration of 
oral contrast, helically acquired serial axial CT images through the chest, 
abdomen and pelvis were obtained. Additional coronal and sagittal reformation 
images were also performed. All CT scans at this facility are performed using dose optimization technique as 
appropriate to the performed exam, to include automated exposure control, 
adjustment of the mA and/or kV according to patient's size (Including 
appropriate matching for site-specific examinations), or use of iterative 
reconstruction technique. FINDINGS: 
 
CT chest:  
 
Endotracheal tube tip is in place cephalad to the level of the alyssa. Esophagogastric tube is noted, tip in the mid stomach. Severe pulmonary emphysematous disease again seen with extensive bullous changes 
most conspicuous in the upper lungs. Scattered scarring. There has been interval 
development of consolidative appearing lung opacities in the lower lobes 
bilaterally consistent with airspace disease likely in addition to atelectasis. The small stable subpleural pulmonary nodule described in the right lower lobe 
on the recent chest CT is again seen, slightly better visualized on the current 
study measuring approximately 4 mm, unchanged compared to the CT of 2/6/2018 
(axial 34). Multifocal chronic nodular pleuro-parenchymal scarring in the left 
upper lobe without significant interval change compared to the preceding chest 
CT or study of 2/6/2018. No definite suspicious new pulmonary nodule/mass identified compared to the 
preceding CT. No evidence of pathologic lymph node enlargement is seen. No evidence of pleural or significant pericardial effusion. CT abdomen/pelvis:  
 
Small ascites, best seen around the liver and in the anterior pelvis adjacent to 
the distal descending and sigmoid colon. The spleen is heterogeneous in attenuation and demonstrates several rounded 
hypodensities as detailed on the recent chest CT of 1/21/2020. Indeterminate attenuation masses in the kidneys bilaterally, 2.7 cm right kidney 
upper to midpole laterally, 2.1 cm left kidney interpolar region posteriorly. Further evaluation recommended, beginning with ultrasound might be helpful when 
clinically feasible if the corresponding lesions can be identified 
sonographically. Additional smaller subcentimeter renal hypodensities 
bilaterally, possibly cysts, too small to be specifically characterized. The liver, gallbladder, pancreas, and adrenal glands appear unremarkable. Scattered calcifications in the abdominal aorta and its major branches. The 
abdominal aorta enhances normally without abdominal aortic aneurysm. A few scattered small subcentimeter short axis lymph nodes bilaterally in the 
pelvis or retroperitoneum. No evidence of pathologic lymph node enlargement is 
seen. Moderate quantity of intraperitoneal free air in the nondependent anterior 
abdomen, consistent with perforated viscus. Additional scattered foci of free 
air elsewhere in the peritoneum and mesentery, including in the right upper 
quadrant near the gallbladder/duodenum, and scattered bilaterally in the 
mesenteric/peritoneal fat of the upper and lower abdomen. Grouped small foci of 
intraperitoneal free air are noted close to the colonic wall along the 
left/anterior side of the sigmoid colon (reference axial 112-119). The left 
hemicolon is diffusely mildly distended to the rectum containing gas, stool, and 
hyperdense material. Clinical correlation might be helpful regarding recent 
ingestion of hyperdense material. The rectum measures approximately 8.5 cm in 
caliber. The sigmoid measures approximately 7.3 cm in caliber on axial image 115. The site of the or free viscus is uncertain. Common sites could include 
duodenal or gastric perforation such as may be seen due to perforated ulcer.  
However, there is small focal hypoattenuation along the wall of the sigmoid 
colon on series 2 axial images 111-113 which could potentially reflect a small 
mural defect such as could be seen related to constipation, stercoral colitis, 
 diverticular disease. The appendix is seen within the ascites in the right lower quadrant, assessment 
for stranding in the periappendiceal fat limited by the ascites, without gross 
abnormal thickening of the appendix seen. No gas within the appendix. The right 
hemicolon appears grossly unremarkable. There is mild diffuse mural thickening of small bowel loops and mild prominence 
of enhancement, nonspecific. No definite associated pneumatosis. No portal 
venous gas is seen. The SMA/SMV appear to maintain usual orientation. Broadly 
speaking differential considerations could include inflammatory, infectious, 
ischemic. Clinical correlation is recommended including with lactate 
measurement. Conceivably sequela of peritonitis in the setting of bowel 
perforation? Uterus is present. Small gas is noted within the uterus and vagina, nonspecific, 
can be seen as a physiologic finding. On review of bone windows, there is a superior endplate compression fracture 
with mildly to moderately decreased vertebral body height at L2. Bilateral 
spinal fusion rods spanning from K5-V4-X37-T12. Severe compression fracture 
deformity again seen at T9. Compression fracture with rather pronounced 
decreased vertebral body height again seen at T5. Mild endplate indentations at 
several other levels including T4, T6, T10. The posterior midline fluid 
collection described on the preceding study was better visualized on the 
preceding study, extensive metallic hardware artifacts noted. Impression Impression: Moderate quantity of intraperitoneal free air. Findings are consistent with 
perforated viscus. The site of perforation is uncertain, as discussed above, 
possibly the sigmoid colon where there is apparent small subtle focal 
hypoattenuation along the wall as detailed above (reference axial images 111-113) close to region where several foci of free air are grouped. The left hemicolon is mildly distended and filled with hyperdense appearing stool. Mild diffuse mural thickening and enhancement of the small bowel as described. Small volume of ascites best seen around the liver and in the pelvis adjacent to 
the sigmoid. Bibasilar consolidations, right greater than left, suspicious for airspace 
disease/pneumonia. Indeterminate attenuation bilateral renal masses, follow-up evaluation 
recommended. Splenic hypodensities again seen as described on recent chest CT. Intubated. Severe pulmonary emphysematous disease and fibrotic changes. Multiple otherwise nonacute findings as detailed above. I have discussed these results directly with Dr. [facility] in the ED at 12:20 p.m. 
in addition to the patient's consulted surgeon at 12:25 PM. IMPRESSION:  
· Acute on Chronic Respiratory Failure - Emergently intubated in ED for airway protection, 2/2 below. Now post-op and remains on mechanical ventilation. Chronically on 3L NC at home. · Acute Sigmoid Colon Perforation - s/p Emergent Ex-Lap w/ drainage of intraabdominal stool and collections; sigmoid colectomy with Dr. Viviana Johnson on 02/29/20. Subsequent washout and abdominal closure on 03/01/20. · Pyrexia - Fever as high as 100.9 F today. · Tachyarrhythmia - sinus tachycardia going into atrial flutter; digoxin unsuccessful, bolused with Amio and gtt started; pt became hypotensive w/ Amio, requiring escalation of pressors. · Septic Shock with MSOF - 2/2 above. Improving. Currently on Levophed,  Wil-Synephrine. · Peritonitis - 2/2 above - Sigmoid Colon Perforation with large intraabdominal stool burden. · Bandemia - Initially with 22% bands. Was shifting to leukocytosis, but today (3/3) spiked back up. ID is following. · Lactic Acidosis - 2/2 above. Now resolved. · Metabolic Acidosis - 2/2 above · ELIF - Likely pre-renal, 2/2 above · UTI - UC grew E.coli · Lytes - Currently resolved, pt on electrolyte replacement protocol · T2DM - Pt on Lantus 20u qhs at home. · Paraparesis, in wheelchair: Recent Hx of Spine Thoracic Laminectomy T6-T11 with Fusion (12/11/20) with Dr. Vilma Wyman 2/2 Acute compression fx of T9-T10 and spinal cord compression at T9 and T10 and spinal cord edema with subsequent paraparesis. · Hx of Sarcoidosis - Chronic steroid use. · Hx of COPD - Follows with Dr. Hai Saucedo in clinic. · Hx of Cocaine and Heroin Abuse - Noted to be on Methadone therapy at home. Patient Active Problem List  
Diagnosis Code  Diabetic neuropathy associated with type 2 diabetes mellitus (HCC) E11.40  Venous insufficiency I87.2  Obesity, Class I, BMI 30-34.9 E66.9  Chronic back pain M54.9, G89.29  
 Generalized osteoarthritis of multiple sites M15.9  Bipolar affective disorder ([facility] [address] F31.9  Abnormally low high density lipoprotein (HDL) cholesterol with hypertriglyceridemia E78.6, E78.1  Diastolic dysfunction without heart failure I51.89  Chronic obstructive pulmonary disease (COPD) ([facility] [address] J44.9  Hypertensive heart disease without heart failure I11.9  Depression F32.9  History of back injury Z87.828  Type 2 diabetes mellitus with diabetic neuropathy, with long-term current use of insulin ([facility]) E11.40, Z79.4  Anxiety F41.9  Wears glasses Z97.3  History of hepatitis C Z86.19  
 Sickle cell trait ([facility]) D57.3  History of acute renal failure Z87.448  Hypothyroidism E03.9  Recurrent genital herpes A60.00  Sarcoidosis D86.9  Abscess of right arm L02.413  Hypoxemia requiring supplemental oxygen R09.02, Z99.81  
 Abnormal nuclear stress test R94.39  
 Cellulitis and abscess of hand L03.119, L02.519  
 Cellulitis and abscess of foot L03.119, L02.619  
 Cellulitis of arm, right L03. 113  
 Olecranon bursitis of right elbow M70.21  
 Contusion of left elbow S50. 02XA  Intravenous drug user F19.90  
  Right Achilles tendinitis M76.61  
 History of penicillin allergy Z88.0  Falls frequently R29.6  Gastroesophageal reflux disease with hiatal hernia K21.9, K44.9  Memory difficulty R41.3  Mixed connective tissue disease ([facility] [address] M35.1  Impaired mobility and ADLs Z74.09  
 Hyperuricemia E79.0  History of vitamin D deficiency Z86.39  
 Urge urinary incontinence N39.41  Spinal cord compression ([facility]) G95.20  Compression fracture of body of thoracic vertebra ([facility]) S22.000A  Status post laminectomy with spinal fusion Z98.1  Acute blood loss as cause of postoperative anemia D62  
 History of fracture due to fall Z87.81  Chronic respiratory failure with hypoxia ([facility]) J96.11  
 Cellulitis of right forearm L03. 113  
 Gout M10.9  Menopause Z78.0  Long term current use of aspirin Z79.82  
 Opioid dependence ([facility] [facility] F11.20  Tobacco use disorder F17.200  Sepsis ([facility] [address] A41.9  Perforated viscus R19.8  Septic shock ([facility]) A41.9, R65.21  
 
  
RECOMMENDATIONS:  
Neuro: Fentanyl and Propofol gtt for pain control and sedation. Titrate sedation for RASS goal -3 to -4 due to post-op status. RN reports pt became agitated and tachycardic during wean trial. 
Pulm: Aspiration precautions, HOB>30'. VAP Bundle Titrate FiO2 O2 for SpO2 >88-92%; optimize bronchial hygiene. Duo-nebs q4 and Albuterol nebs q6 PRN. Pulmicort nebs BID. Daily CXR and ABG while intubated. Wean FiO2 as tolerated. CVS: Monitor HD, MAP goal >65. Continue Amiodarone 0.5mg/min. Wean pressors. Repeat Echo. GI: NPO. Not appropriate for enteral nutrition at this time per surgery. Renal: Trend Cr, UOP. Renae (+). UC grew E.coli. Pt is on Zosyn. Hem/Onc: Trend H/H, monitor for s/o active bleeding. Check CBC and PT/INR post-op. I/D: Trend WBCs and temperature curve. Tylenol prn. Lactic acid normalized. Pt with leukocytosis and increased bandemia.  Sepsis bundle per hospital protocol, BxCx currently growing GNR, UC came back with E.coli. Infectious disease is following the patient and increased Zosyn dosage today. Continue Eraxis for antifungal ppx per ID, pt is at high risk for fungemia with bowel perforation. Surgery and ID following. Metabolic: Daily BMP, mag, phos. Trend lytes and replace per protocol. Endocrine: Q6 glucoses. SSI. Avoid hypoglycemia. Musc/Skin: General surgery managing surgical incision care. Full Code Discussed in interdisciplinary rounds Best Practices/Safety Bundles: 
· Sepsis Bundle per Hospital Protocol · Glycemic control; avoid Hypoglycemia · IHI ICU Bundles: 
·      Central Line Bundle Followed , Rousseau Bundle Followed and Vent Bundle Followed, Vent Day 3 
· Mech Vent patients/ Pulmonary pts:  
· VAP bundle, Aim to keep peak plateau pressure 90-45DP H2O 
· Aspiration Precautions - HOB >30' · Daily sedation holiday as indicated · SBT as tolerated/appropriate · Stress ulcer prophylaxis: Protonix · DVT prophylaxis: SCDs, heparin SQ 
· Need for Lines, rousseau assessed. · Restraints need. High complexity decision making was performed during this consultation and evaluation. [x]       Pt is at high risk for further organ failure and dysfunction. Cameron Hdz PA-C 
03/03/20 Pulmonary, Critical Care Medicine [facility]

## 2020-03-03 NOTE — PROGRESS NOTES
NUTRITION Nutrition Screen RECOMMENDATIONS / PLAN:  
 
- Monitor ability to tolerate enteral nutrition versus need for parenteral nutrition.  
- Continue RD inpatient monitoring and evaluation. NUTRITION INTERVENTIONS & DIAGNOSIS:  
 
- Enteral nutrition: not appropriate at this time. - Collaboration and referral of nutrition care: interdisciplinary rounds Nutrition Diagnosis: Inadequate oral intake related to respiratory status, altered GI function as evidenced by pt NPO. ASSESSMENT:  
 
3/3: Remains intubated, no nausea/vomiting, no ostomy function. No output documented from NGT,\.  
3/2: S/p ex lap, descending colectomy and creation of colostomy on 3/1 then re-ex lap, drainage of intra-abdominal stool collection, descending colectomy with mobilization of the splenic flexure and transverse colostomy. Remains intubated on the vent postop. Nutritional intake adequate to meet patients estimated nutritional needs:  No 
 
Diet: DIET NPO Food Allergies: NKFA Current Appetite: Not Applicable - NPO Appetite/meal intake prior to admission: Unable to determine at this time Feeding Limitations:  [] Swallowing difficulty    [] Chewing difficulty    [x] Other: respiratory status Current Meal Intake: No data found. BM: PTA; colostomy Skin Integrity: abdominal surgical incision; MOHINDER drain Edema:   [x] No     [] Yes Pertinent Medications: Reviewed: steroid, SSI, pantoprazole, phenylephrine, propofol at 50 mcg/kg/min (678 kcal per day), vasopressin Recent Labs 03/03/20 
0440 03/02/20 
1420 03/02/20 
0410  145 142  
K 3.9 4.0 4.3 * 114* 113* CO2 24 22 21 * 101* 95 BUN 18 18 17 CREA 0.97 0.97 1.02  
CA 7.4* 7.3* 7.1*  
MG 2.1 2.2 2.0 PHOS 3.5 3.7 4.3 ALB 1.7* 2.0* 2.2*  
SGOT 55* 44* 44* ALT 21 16 13 Intake/Output Summary (Last 24 hours) at 3/3/2020 1308 Last data filed at 3/3/2020 0700 Gross per 24 hour Intake 4215.27 ml  
 Output 2230 ml Net 1985.27 ml Anthropometrics: 
Ht Readings from Last 1 Encounters:  
02/29/20 5' 4\" (1.626 m) Last 3 Recorded Weights in this Encounter 02/29/20 1502 03/02/20 0500 03/03/20 0700 Weight: 88 kg (194 lb 0.1 oz) 85.6 kg (188 lb 11.4 oz) 87.2 kg (192 lb 3.9 oz) Body mass index is 33 kg/m². Obese, Class I Weight History: previously reported intentional weight loss and previous usual weight of 230 lb Weight Metrics 3/3/2020 2/28/2020 1/22/2020 1/17/2020 1/2/2020 12/16/2019 12/14/2019 Weight 192 lb 3.9 oz - 171 lb 11.2 oz - 172 lb 6.4 oz - 178 lb 9.6 oz BMI 33 kg/m2 28.57 kg/m2 - 28.57 kg/m2 - 28.69 kg/m2 - Some encounter information is confidential and restricted. Go to Review Flowsheets activity to see all data. Admitting Diagnosis: Septic shock ([facility] [address] [A41.9, R65.21] Perforated viscus [R19.8] Pertinent PMHx: COPD, HTN, DM, hepatitis C, sarcoidosis, methadone use Education Needs:        [x] None identified  [] Identified - Not appropriate at this time  []  Identified and addressed - refer to education log Learning Limitations:   [] None identified  [x] Identified: altered mentation Cultural, Gnosticism & ethnic food preferences:  [x] None identified    [] Identified and addressed ESTIMATED NUTRITION NEEDS:  
 
Calories: 946-1204 kcal (11-14 kcal/kg) based on  [x] Actual BW 86 kg     [] IBW Protein: 110-138 gm (2-2.5 gm/kg) based on  [] Actual BW      [x] IBW 55 kg Fluid: 1 mL/kcal 
  
MONITORING & EVALUATION:  
 
Nutrition Goal(s):  
- Nutritional needs will be met through adequate oral intake or nutrition support within the next 7 days. Outcome: Not progressing towards goal 
 
Monitoring:   [x] Food and nutrient intake   [x] Food and nutrient administration  [x] Comparative standards   [x] Nutrition-focused physical findings   [x] Anthropometric Measurements   [x] Treatment/therapy   [x] Biochemical data, medical tests, and procedures Previous Recommendations (for follow-up assessments only):  Not Implemented (RD to address) Discharge Planning: Nutritional discharge needs unknown at this time. Participated in care planning, discharge planning, & interdisciplinary rounds as appropriate. Xochilt Palm RD, [facility] Pager: [phone]

## 2020-03-03 NOTE — DIABETES MGMT
GLYCEMIC CONTROL PLAN OF CARE Assessment/Recommendations: 
Lab glucose this am 168 mg/dl Noted pt receiving steroids Corrective insulin coverage ordered as needed Will continue inpatient monitoring. Most recent blood glucose values: 
Results for Jordi Humphrey (MRN [id number]) as of 3/3/2020 10:19 Ref. Range 3/2/2020 11:48 3/2/2020 16:37 3/2/2020 23:29 3/3/2020 06:21 GLUCOSE,FAST - POC Latest Ref Range: 70 - 110 mg/dL 102 138 (H) 196 (H) 199 (H) Current A1C of 8.2 % is equivalent to average blood glucose of 189_mg/dl over the past 2-3 months. Current hospital diabetes medications:  
Lispro corrective insulin coverage every 6 hours as needed Previous day's insulin requirements:  
Lispro 0 units corrective insulin Home diabetes medications:  Per pta med list 
Lantus 20 units daily before breakfast 
Humalog AC based on her blood sugar reading Diet:   
NPO Education:  ____Refer to Diabetes Education Record  
          _x__Education not indicated at this time 
olibamanuela Bhakta RN CDE Ext [phone]

## 2020-03-03 NOTE — ROUTINE PROCESS
Bedside and Verbal shift change report given to [facility] [employer] (oncoming nurse) by Annelise Laughlin RN 
 (offgoing nurse). Report included the following information SBAR, MAR and Cardiac Rhythm . Myra Urbina

## 2020-03-03 NOTE — PROGRESS NOTES
Per pulmonary PA peep increased to 8 to improve PaO2. Will recheck ABG. 03/03/20 8075 Vent Settings PEEP/VENT (cm H2O) 8 cm H20

## 2020-03-03 NOTE — PROGRESS NOTES
Infectious Disease progress Note Reason: sepsis, gram negative bacteremia,  
 
Current abx Prior abx Pip/tazo since 3/1 Cefepime, metronidazole 2/29-3/1 Vancomycin 2/29-3/2/20 Lines:  
 
 
Assessment : 
 
62 y.o. 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 Clinical presentation c/w septic shock (POA) due to gram negative bloodstream infection (positive blood culture 2/29), sigmoid perforation, secondary peritonitis s/p emergent Ex lap on 2/29/20. Intra operatively found to have sigmoid colon perforation, 2/2 constipation with large amount of stool burden spilling into the peritoneum per Dr. Natalie Alexander. S/p Re-exploratory laparotomy, drainage of intra-abdominal stool collection, descending colectomy with mobilization of the splenic flexure, and transverse colostomy on 3/1. Significant pyuria on UA 2/29. Urine culture >100,000 e.coli suggestive of probable cystitis. Acute on chronic respiratory failure - likely due to sepsis. Sputum culture 3/1- moraxella (beta lactamase positive). Diffuse pulmonary opacities concerning for aspiration pneumonia. Persistent high pressor requirement - ?due to severe gram negative sepsis. R/o sepsis induced cardiomyopathy. abx management complicated due to h/o pcn allergy. Currently tolerating pip/tazo without difficulties. Recommendations: 1. continue pip/tazo - increase dose. 2. Continue anidulafungin for now 3. Titrate pressors as tolerated 4. Send serum beta d glucan 5. F/u echo 6. F/u id of gnr in blood culture 2/29 7. Repeat blood cultures today 8. patient is at high risk for intra abdominal abscess. Above plan was discussed in details with dr. Aminta Belle. Please call me if any further questions or concerns. Will continue to participate in the care of this patient. HPI: 
 detailed ros not feasible since patient intubated. home Medication List  
 Details  
roflumilast (DALIRESP) 250 mcg tab Take 250 mcg by mouth daily. Qty: 30 Tab, Refills: 0  
  
ARIPiprazole (ABILIFY) 10 mg tablet Take 1 Tab by mouth. aspirin-calcium carbonate 81 mg-300 mg calcium(777 mg) tab Take 1 Tab by mouth. divalproex DR (DEPAKOTE) 250 mg tablet Take 1 Tab by mouth. doxycycline (MONODOX) 100 mg capsule   
  
gabapentin (NEURONTIN) 300 mg capsule Take 1 Cap by mouth. ipratropium (ATROVENT) 0.02 % soln   
  
methylPREDNISolone (MEDROL DOSEPACK) 4 mg tablet   
  
oxyCODONE-acetaminophen (PERCOCET) 5-325 mg per tablet Take 1 Tab by mouth. tolterodine (DETROL) 1 mg tablet Take 1 Tab by mouth. traMADol (ULTRAM) 50 mg tablet Take 1 Tab by mouth. traZODone (DESYREL) 100 mg tablet Take 1 Tab by mouth. !! busPIRone (BUSPAR) 15 mg tablet Take 15 mg by mouth two (2) times a day. Indications: repeated episodes of anxiety Qty: 60 Tab, Refills: 2 OXYGEN-AIR DELIVERY SYSTEMS 3 L/min by Nasal route continuous. First Choice O2  
  
albuterol-ipratropium (DUO-NEB) 2.5 mg-0.5 mg/3 ml nebu 3 mL by Nebulization route every six (6) hours as needed for Wheezing. Qty: 30 Nebule, Refills: 1  
  
!! busPIRone (BUSPAR) 10 mg tablet Take 10 mg by mouth three (3) times daily. Indications: repeated episodes of anxiety  
  
cholecalciferol (VITAMIN D3) (1000 Units /25 mcg) tablet Take 1,000 Units by mouth two (2) times a day. levothyroxine (SYNTHROID) 100 mcg tablet Take 100 mcg by mouth Daily (before breakfast). insulin glargine (LANTUS U-100 INSULIN) 100 unit/mL injection 20 Units by SubCUTAneous route Daily (before breakfast). famotidine (PEPCID) 20 mg tablet Take 20 mg by mouth nightly. aspirin 81 mg chewable tablet Take 1 Tab by mouth daily (with breakfast). Indications: stroke prevention 
Qty: 30 Tab, Refills: 0  
  
docusate sodium (COLACE) 100 mg capsule Take 1 Cap by mouth daily (after breakfast). Indications: constipation 
Qty: 30 Cap, Refills: 0  
  
predniSONE (DELTASONE) 20 mg tablet Take 20 mg by mouth daily (with breakfast). Indications: sarcoidosis Qty: 30 Tab, Refills: 0 Associated Diagnoses: Sarcoidosis  
  
ascorbic acid, vitamin C, (VITAMIN C) 250 mg tablet Take 1 Tab by mouth daily (with breakfast). Qty: 30 Tab, Refills: 0 Associated Diagnoses: Acute blood loss as cause of postoperative anemia  
  
calcium citrate 200 mg (950 mg) tablet Take 1 Tab by mouth two (2) times a day. Indications: post-menopausal osteoporosis prevention 
Qty: 60 Tab, Refills: 0 Associated Diagnoses: Status post laminectomy with spinal fusion  
  
ferrous sulfate 325 mg (65 mg iron) tablet Take 1 Tab by mouth daily (with breakfast). Qty: 30 Tab, Refills: 0 Associated Diagnoses: Acute blood loss as cause of postoperative anemia  
  
acetaminophen (TYLENOL) 325 mg tablet Take 2 Tabs by mouth every four (4) hours as needed for Pain. Indications: pain 
Qty: 60 Tab, Refills: 0 Associated Diagnoses: Status post laminectomy with spinal fusion  
  
brief disposable (ADULT) misc by Does Not Apply route. Qty: 1 Package, Refills: 0 Associated Diagnoses: Urge urinary incontinence  
  
methadone (DOLOPHINE) 5 mg tablet Take 4 Tabs by mouth daily. Max Daily Amount: 20 mg. 
Qty: 10 Tab, Refills: 0 Associated Diagnoses: History of back injury  
  
polyethylene glycol (MIRALAX) 17 gram/dose powder Take 17 g by mouth daily. 1 tablespoon with 8 oz of water daily 
Qty: 289 g, Refills: 0  
  
umeclidinium-vilanterol (ANORO ELLIPTA) 62.5-25 mcg/actuation inhaler Take 1 Puff by inhalation daily. Qty: 1 Inhaler, Refills: 0 BD INSULIN SYRINGE ULTRA-FINE 1 mL 31 gauge x 5/16 syrg rosuvastatin (CRESTOR) 5 mg tablet TAKE ONE TABLET NIGHTLY Qty: 90 Tab, Refills: 3  
  
albuterol (PROAIR HFA) 90 mcg/actuation inhaler INHALE 2 PUFFS EVERY 4 HOURS AS NEEDED FOR WHEEZING Qty: 1 Inhaler, Refills: 5  
  
oxybutynin (DITROPAN) 5 mg tablet Take 5 mg by mouth two (2) times a day. allopurinol (ZYLOPRIM) 100 mg tablet Take 100 mg by mouth daily. insulin lispro (HUMALOG) 100 unit/mL injection To be given 15 min before meals: < 150 - None, 151-200 - 2 u, 201-250 - 4 u, 251-300 - 6 u, 301-350 - 8 u, 351-400 - 10 u, >400 - 12 u Qty: 1 Vial, Refills: 0 Associated Diagnoses: Type 2 diabetes mellitus with stage 3 chronic kidney disease, with long-term current use of insulin ([facility])  
  
insulin syringe,safetyneedle 1 mL 30 gauge x 5/16\" syrg As directed Qty: 50 Each, Refills: 0 Nebulizer Accessories Kit Use with nebulizer machine Qty: 1 Kit, Refills: prn  
 Associated Diagnoses: COPD (chronic obstructive pulmonary disease) ([facility])  
  
sertraline (ZOLOFT) 50 mg tablet Take 50 mg by mouth daily. Indications: Bipolar Depression Current Facility-Administered Medications Medication Dose Route Frequency  heparin (porcine) injection 5,000 Units  5,000 Units SubCUTAneous Q8H  
 dexmedeTOMidine (PRECEDEX) 400 mcg in 0.9% sodium chloride 100 mL infusion  0.2-0.7 mcg/kg/hr IntraVENous TITRATE  propofol (DIPRIVAN) 10 mg/mL infusion  0-50 mcg/kg/min IntraVENous TITRATE  amiodarone (NEXTERONE) 360 mg in dextrose 200 mL (1.8 mg/mL) infusion  0.5-1 mg/min IntraVENous TITRATE  piperacillin-tazobactam (ZOSYN) 3.375 g in 0.9% sodium chloride (MBP/ADV) 100 mL MBP  3.375 g IntraVENous Q6H  
 diphenhydrAMINE (BENADRYL) injection 25 mg  25 mg IntraVENous Q4H PRN  
 sodium chloride (NS) flush 5-10 mL  5-10 mL IntraVENous PRN  chlorhexidine (PERIDEX) 0.12 % mouthwash 10 mL  10 mL Oral Q12H  electrolyte-r (NORMOSOL R) infusion   IntraVENous CONTINUOUS  
  sodium chloride (NS) flush 5-40 mL  5-40 mL IntraVENous PRN  pantoprazole (PROTONIX) 40 mg in 0.9% sodium chloride 10 mL injection  40 mg IntraVENous Q12H  
 fentaNYL (PF) 900 mcg/30 ml infusion soln  0-200 mcg/hr IntraVENous TITRATE  midazolam in normal saline (VERSED) 2 mg/mL infusion  1-10 mg/hr IntraVENous TITRATE  midazolam (VERSED) injection 1-2 mg  1-2 mg IntraVENous Q30MIN PRN  
 fentaNYL citrate (PF) injection  mcg   mcg IntraVENous Q30MIN PRN  
 hydrocortisone Sod Succ (PF) (SOLU-CORTEF) injection 50 mg  50 mg IntraVENous Q6H  
 albuterol-ipratropium (DUO-NEB) 2.5 MG-0.5 MG/3 ML  3 mL Nebulization Q4H RT  
 albuterol (ACCUNEB) nebulizer solution 1.25 mg  1.25 mg Nebulization Q6H PRN  
 budesonide (PULMICORT) 500 mcg/2 ml nebulizer suspension  500 mcg Nebulization BID RT  
 insulin lispro (HUMALOG) injection   SubCUTAneous Q6H  
 glucose chewable tablet 16 g  4 Tab Oral PRN  
 glucagon (GLUCAGEN) injection 1 mg  1 mg IntraMUSCular PRN  
 dextrose (D50W) injection syrg 12.5-25 g  25-50 mL IntraVENous PRN  
 anidulafungin (ERAXIS) 100 mg in 0.9% sodium chloride 130 mL IVPB  100 mg IntraVENous Q24H  
 NOREPINephrine (LEVOPHED) 32 mg in 5% dextrose 250 mL infusion  0.5-30 mcg/min IntraVENous TITRATE  PHENYLephrine (OLU-SYNEPHRINE) 90 mg in 0.9% sodium chloride 250 mL infusion   mcg/min IntraVENous TITRATE  vasopressin (VASOSTRICT) 20 Units in 0.9% sodium chloride 100 mL infusion  0.04 Units/min IntraVENous CONTINUOUS Allergies: Moxifloxacin; Pcn [penicillins]; and Sulfa (sulfonamide antibiotics) Temp (24hrs), Av.9 °F (37.7 °C), Min:99.2 °F (37.3 °C), Max:100.2 °F (37.9 °C) Visit Vitals /65 Pulse 88 Temp 100.2 °F (37.9 °C) Resp 18 Ht 5' 4\" (1.626 m) Wt 87.2 kg (192 lb 3.9 oz) LMP 2012 (Exact Date) SpO2 97% BMI 33.00 kg/m² ROS: unable to obtain Physical Exam: 
 
General:  Intubated/Sedated, NAD  
 Head:  Normocephalic, without obvious abnormality, atraumatic. Eyes:  Conjunctivae/corneas clear. PERRL, Nose: Nares normal. Septum midline. Mucosa normal. No drainage or sinus tenderness. Throat: Lips, mucosa, and tongue normal. Teeth and gums normal.  
Neck: Supple, symmetrical, trachea midline, no adenopathy, no carotid bruit and no JVD. Lungs:   Symmetrical chest rise; good AE bilat; course throughout; no wheezes/rales noted. Heart:  RRR, S1, S2 normal, no m/r/g Abdomen:   Soft, appropriately tender. Distended. Non-rigid. +midline surgical incision, dressing c/d/i. MOHINDER drain to wall suction with mild sanguinous output. Absent bowel sounds. No masses,  No organomegaly. Extremities: Extremities normal, atraumatic, no cyanosis or edema. Pulses: 2+ and symmetric all extremities. Skin: Skin color, texture, turgor normal. No rashes or lesions Neurologic: Sedated Labs: Results:  
Chemistry Recent Labs 03/03/20 
0440 03/02/20 
1420 03/02/20 
0410 * 101* 95  145 142  
K 3.9 4.0 4.3 * 114* 113* CO2 24 22 21 BUN 18 18 17 CREA 0.97 0.97 1.02  
CA 7.4* 7.3* 7.1* AGAP 7 9 8 BUCR 19 19 17 * 73 72  
TP 5.1* 4.3* 4.9* ALB 1.7* 2.0* 2.2*  
GLOB 3.4 2.3 2.7 AGRAT 0.5* 0.9 0.8 CBC w/Diff Recent Labs 03/03/20 
9074 03/02/20 
0410 03/01/20 
3008 WBC 26.1* 20.8* 17.5*  
RBC 3.39* 3.93* 4.42  
HGB 9.5* 10.7* 12.2 HCT 28.6* 32.7* 37.3  156 182 GRANS 70 73 17* LYMPH 3* 2* 1*  
EOS 0 0 0 Microbiology Recent Labs 03/01/20 
0545 02/29/20 
1208 CULT HEAVY MORAXELLA CATARRHALIS BETA LACTAMASE POSITIVE* >100,000 COLONIES/mL ESCHERICHIA COLI* RADIOLOGY: 
 
All available imaging studies/reports in [facility] for this admission were reviewed Dr. Brady Rao, Infectious Disease Specialist 
[phone] March 3, 2020 
8:38 AM

## 2020-03-03 NOTE — CDMP QUERY
Patient admitted with Sepsis, noted to have elevated CK and troponin. If possible, please document in progress notes and d/c summary if you are evaluating and/or treating any of the following:  
 
=> Type 2 MI  
=> Demand Ischemia  
=> Other reason for elevated troponin and CK  
=> Elevation of troponin and CK without clinical significance  
=> Other Explanation of clinical findings  
=> Clinically Undetermined (no explanation for clinical findings) The medical record reflects the following:  
   Risk Factors: 61 yo with Sepsis Clinical Indicators:  
Initial ECG2/29 :  Sinus tachycardia with short ND with occasional premature ventricular  
complexes ECG 3/2: Atrial flutter has replaced Sinus rhythm Non-specific change in ST segment in Inferior leads ST now depressed in Anterior leads Per 3/2 PN: Tachyarrhythmia - sinus tachycardia going into atrial flutter; digoxin unsuccessful, bolused with Amio and gtt started; pt became hypotensive w/ Amio, requiring escalation of pressors.  Troponin trended:  0.04, 0.57, 0.51, 0.49, 0.19 Treatment: Tele monitoring, support with Levophed and Wil-synephrine Thank you, Sukhdev Agg[facility]walSelect [facility], [address]

## 2020-03-04 NOTE — ROUTINE PROCESS
Bedside and Verbal shift change report given to [facility] (oncoming nurse) by Dina Lopez RN 
 (offgoing nurse). Report given with SBAR, Kardex, Intake/Output, MAR, Accordion and Recent Results.

## 2020-03-04 NOTE — PROGRESS NOTES
VENTILATOR CARE PLAN Problem: Ventilator Management Goal: *Adequate oxygenation/ ventilation/ and extubation Patient: 
   
 
Treasa Boxer Roots     62 y.o.   female     3/4/2020  2:39 PM 
Patient Active Problem List  
Diagnosis Code  Diabetic neuropathy associated with type 2 diabetes mellitus ([facility]) E11.40  Venous insufficiency I87.2  Obesity, Class I, BMI 30-34.9 E66.9  Chronic back pain M54.9, G89.29  
 Generalized osteoarthritis of multiple sites M15.9  Bipolar affective disorder ([facility] F31.9  Abnormally low high density lipoprotein (HDL) cholesterol with hypertriglyceridemia E78.6, E78.1  Diastolic dysfunction without heart failure I51.89  Chronic obstructive pulmonary disease (COPD) ([facility] J44.9  Hypertensive heart disease without heart failure I11.9  Depression F32.9  History of back injury Z87.828  Type 2 diabetes mellitus with diabetic neuropathy, with long-term current use of insulin ([facility]) E11.40, Z79.4  Anxiety F41.9  Wears glasses Z97.3  History of hepatitis C Z86.19  
 Sickle cell trait ([facility]) D57.3  History of acute renal failure Z87.448  Hypothyroidism E03.9  Recurrent genital herpes A60.00  Sarcoidosis D86.9  Abscess of right arm L02.413  Hypoxemia requiring supplemental oxygen R09.02, Z99.81  
 Abnormal nuclear stress test R94.39  
 Cellulitis and abscess of hand L03.119, L02.519  
 Cellulitis and abscess of foot L03.119, L02.619  
 Cellulitis of arm, right L03. 113  
 Olecranon bursitis of right elbow M70.21  
 Contusion of left elbow S50. 02XA  Intravenous drug user F19.90  Right Achilles tendinitis M76.61  
 History of penicillin allergy Z88.0  Falls frequently R29.6  Gastroesophageal reflux disease with hiatal hernia K21.9, K44.9  Memory difficulty R41.3  Mixed connective tissue disease ([facility] M35.1  Impaired mobility and ADLs Z74.09  
 Hyperuricemia E79.0  History of vitamin D deficiency Z86.39  
  Urge urinary incontinence N39.41  Spinal cord compression ([facility]) G95.20  Compression fracture of body of thoracic vertebra ([facility]) S22.000A  Status post laminectomy with spinal fusion Z98.1  Acute blood loss as cause of postoperative anemia D62  
 History of fracture due to fall Z87.81  Chronic respiratory failure with hypoxia ([facility]) J96.11  
 Cellulitis of right forearm L03. 113  
 Gout M10.9  Menopause Z78.0  Long term current use of aspirin Z79.82  
 Opioid dependence ([facility]) F11.20  Tobacco use disorder F17.200  Sepsis ([address] A41.9  Perforated viscus R19.8  Septic shock ([facility]) A41.9, R65.21 Septic shock ([address] [A41.9, R65.21] Perforated viscus [R19.8] Reason patient intubated: Post op bowel resection Ventilator day:  5 Ventilator settings:  Ac/vc ETT Size/Placement:  7.5 23 lip Wean Screen Pass (Yes or No): NO Wean Screen Reason for Failure: sedation and hemodynamics Duration of Weaning Trial: 
Additional Comments: PLAN OF CARE:   Continue to assess daily for weaning. Wean when stable GOAL: 
 
 
OUTCOME:  
 
ABG: 
Date:3/4/2020 Lab Results Component Value Date/Time PHI 7.426 03/04/2020 04:07 AM  
 PCO2I 46.8 (H) 03/04/2020 04:07 AM  
 PO2I 111 (H) 03/04/2020 04:07 AM  
 HCO3I 30.8 (H) 03/04/2020 04:07 AM  
 FIO2I 55 03/04/2020 04:07 AM  
 
 
Chest X-ray: 
Date:3/4/2020 Results from [facility] Encounter encounter on 02/29/20 XR CHEST PORT Narrative PORTABLE CHEST X-RAY 
 
CPT CODE: 59554 INDICATION: Endotracheal tube placement. COMPARISON: Plain film 3/3/2020. FINDINGS:  
Right jugular central line remains. Enteric tube remains, tip not included in 
the field-of-view. Endotracheal tube in place, tip obscured by superimposed thoracic spinal 
hardware on this obliquity. Suspect tip terminates approximately 2.6 cm proximal 
to the alyssa. No pneumothorax. Similar retrocardiac density on the left. Similar patchy airspace infiltrate in the lateral left lower lobe. Residual 
patchy dependent airspace opacities at the right base, subjectively minimally 
improved. No definite new process. Impression IMPRESSION: 
1. Slightly improved right basilar airspace infiltrates. Persistent 
retrocardiac density and left lung parenchymal opacities. 2. Support lines and tubes in place as above. Lab Test: 
HIQW:8/4/9932 WBC:  
Lab Results Component Value Date/Time WBC 23.9 (H) 03/04/2020 04:10 AM  
HGB:  
Lab Results Component Value Date/Time HGB 9.0 (L) 03/04/2020 04:10 AM  
 PLTS:  
Lab Results Component Value Date/Time PLATELET 98 (L) 99/12/3609 04:10 AM  
 
 
 
Vital Signs:    
Patient Vitals for the past 8 hrs: 
 Temp Pulse Resp BP SpO2  
03/04/20 1300 98.8 °F (37.1 °C) 73  147/60 98 % 03/04/20 1223  76 16  98 % 03/04/20 1200 98.4 °F (36.9 °C) 67  131/69 98 % 03/04/20 1130 98.4 °F (36.9 °C) 71 20 122/72 98 % 03/04/20 1115  74  117/66   
03/04/20 1100 98.3 °F (36.8 °C) 73 16 117/66 98 % 03/04/20 1030 98.2 °F (36.8 °C) 78 16 108/63 98 % 03/04/20 1000 98.2 °F (36.8 °C) 67 16 157/68 98 % 03/04/20 0930 97.9 °F (36.6 °C) 84 16  98 % 03/04/20 0901  67 16  98 % 03/04/20 0900 97.8 °F (36.6 °C) 69 19 155/78 98 % 03/04/20 0856     95 % 03/04/20 0830 97.4 °F (36.3 °C) (!) 58 21 150/72 99 % 03/04/20 0800 96.9 °F (36.1 °C) (!) 57 16 129/71 99 % 03/04/20 0730 96.9 °F (36.1 °C) (!) 59  130/71 99 % 03/04/20 0700 96.7 °F (35.9 °C) (!) 57 16 133/69 98 %

## 2020-03-04 NOTE — PROGRESS NOTES
03/04/20 0901 Weaning Parameters Spontaneous Breathing Trial Complete No (Comments) 
(positive fluid balance)

## 2020-03-04 NOTE — PROGRESS NOTES
attended the interdisciplinary rounds for Kaushal Daniels, who is a 62 y.o.,female. Patients Primary Language is: [place]. According to the patients EMR Islam Affiliation is: [place]. The reason the Patient came to the hospital is:  
Patient Active Problem List  
 Diagnosis Date Noted  Bipolar affective disorder ([address] 12/05/2012 Priority: 1 - One  Perforated viscus 02/29/2020  Septic shock ([address] 02/29/2020  Sepsis ([address] 01/17/2020  Chronic respiratory failure with hypoxia ([address]  Gout  Menopause  Long term current use of aspirin  Opioid dependence ([address]  Tobacco use disorder  Acute blood loss as cause of postoperative anemia 12/12/2019  Impaired mobility and ADLs 12/11/2019  Spinal cord compression ([address] 12/11/2019  Compression fracture of body of thoracic vertebra ([facility]) 12/11/2019  Status post laminectomy with spinal fusion 12/11/2019  
 History of fracture due to fall 12/11/2019  Hyperuricemia 05/26/2017  Mixed connective tissue disease ([address] 05/10/2017  History of vitamin D deficiency 05/10/2017  Urge urinary incontinence 05/10/2017  History of penicillin allergy  Falls frequently  Gastroesophageal reflux disease with hiatal hernia  Memory difficulty  Cellulitis of arm, right 05/04/2017  Olecranon bursitis of right elbow 05/04/2017  Contusion of left elbow 05/04/2017  Cellulitis of right forearm 05/04/2017  Intravenous drug user 05/02/2017  Right Achilles tendinitis 05/02/2017  Cellulitis and abscess of hand 04/13/2017  Cellulitis and abscess of foot 04/13/2017  Abnormal nuclear stress test 11/17/2016  Hypoxemia requiring supplemental oxygen 12/29/2014  Abscess of right arm 06/03/2013  History of acute renal failure 05/31/2013  Recurrent genital herpes 05/31/2013  Hypothyroidism  Sarcoidosis  Diastolic dysfunction without heart failure  Chronic obstructive pulmonary disease (COPD) ([address]  Hypertensive heart disease without heart failure  Depression  History of back injury  Type 2 diabetes mellitus with diabetic neuropathy, with long-term current use of insulin ([address]  Anxiety  Wears glasses  History of hepatitis C   
 Sickle cell trait ([address]  Abnormally low high density lipoprotein (HDL) cholesterol with hypertriglyceridemia  Generalized osteoarthritis of multiple sites  Diabetic neuropathy associated with type 2 diabetes mellitus ([address]  Venous insufficiency  Obesity, Class I, BMI 30-34.9  Chronic back pain Plan: 
Chaplains will continue to follow and will provide pastoral care on an as needed/requested basis.  recommends bedside caregivers page  on duty if patient shows signs of acute spiritual or emotional distress. [address] [facility] Certified [facility]  
[phone]

## 2020-03-04 NOTE — PROGRESS NOTES
NUTRITION Nutrition Screen RECOMMENDATIONS / PLAN:  
 
- Start trickle tube feeding of Vital AF 1.2 at 10 mL/hr with 30 mL q 4 hour water flushes. Monitor residual volume every hour for the first 4 hours and hold for over 200 mL. Transition to checking every 4 hours if residuals remain under 200 mL. - Monitor tolerance and ability to advance to goal rate of 40 mL/hr with Prosource TID.  
- Continue RD inpatient monitoring and evaluation. Starting Enteral Regimen: Vital AF at 10 mL/hr + 30 mL q 4 hour water flushes to provide: 288 kcal, 18 gm protein, 13 gm fat, 27 gm CHO, 1 gm fiber, 195 mL free water, 375 mL total free water, 20% RDIs NUTRITION INTERVENTIONS & DIAGNOSIS:  
 
- Enteral nutrition: initiate trickle feeds - Collaboration and referral of nutrition care: interdisciplinary rounds, discussed trickle feeds with Surgery Nutrition Diagnosis: Inadequate oral intake related to respiratory status, altered GI function as evidenced by pt NPO. ASSESSMENT:  
 
3/4: Minimal out from colostomy with 1100 mL out from NGT- will start trial of trickle feeding and monitor. 3/3: Remains intubated, no nausea/vomiting, no ostomy function. No output documented from NGT.  
3/2: S/p ex lap, descending colectomy and creation of colostomy on 3/1 then re-ex lap, drainage of intra-abdominal stool collection, descending colectomy with mobilization of the splenic flexure and transverse colostomy. Remains intubated on the vent postop. Nutritional intake adequate to meet patients estimated nutritional needs:  No 
 
Diet: DIET TUBE FEEDING Please check residuals every hour for first 4 hours after starting feeds- please hold for residual over 200 mL and contact MD. If residuals less than 200 mL after 4 hours, decrease to checking residuals every 4 hours. Food Allergies: NKFA Current Appetite: Not Applicable - NPO Appetite/meal intake prior to admission: Unable to determine at this time Feeding Limitations:  [] Swallowing difficulty    [] Chewing difficulty    [x] Other: respiratory status Current Meal Intake: No data found. BM: PTA; colostomy (30 mL serosanguinous output) Skin Integrity: abdominal surgical incision; MOHINDER drain Edema:   [x] No     [] Yes Pertinent Medications: Reviewed: steroid, SSI, pantoprazole, propofol at 30 mcg/kg/min (407 kcal per day), 80 mEq KCl, levophed, furosemide Recent Labs 03/04/20 
0410 03/03/20 
0440 03/02/20 
1420 * 143 145  
K 2.5* 3.9 4.0  
 112* 114* CO2 26 24 22 * 168* 101* BUN 19* 18 18 CREA 0.84 0.97 0.97  
CA 7.7* 7.4* 7.3*  
MG 2.0 2.1 2.2 PHOS 3.4 3.5 3.7 ALB 1.7* 1.7* 2.0*  
SGOT 22 55* 44* ALT 14 21 16 Intake/Output Summary (Last 24 hours) at 3/4/2020 1227 Last data filed at 3/4/2020 1205 Gross per 24 hour Intake 2501 ml Output 4530 ml Net -2029 ml Anthropometrics: 
Ht Readings from Last 1 Encounters:  
03/03/20 5' 4\" (1.626 m) Last 3 Recorded Weights in this Encounter 03/03/20 0700 03/03/20 1457 03/04/20 0530 Weight: 87.2 kg (192 lb 3.9 oz) 87.1 kg (192 lb) 88.1 kg (194 lb 3.6 oz) Body mass index is 33.34 kg/m². Obese, Class I Weight History: previously reported intentional weight loss and previous usual weight of 230 lb Weight Metrics 3/4/2020 2/28/2020 1/22/2020 1/17/2020 1/2/2020 12/16/2019 12/14/2019 Weight 194 lb 3.6 oz - 171 lb 11.2 oz - 172 lb 6.4 oz - 178 lb 9.6 oz BMI 33.34 kg/m2 28.57 kg/m2 - 28.57 kg/m2 - 28.69 kg/m2 - Some encounter information is confidential and restricted. Go to Review Flowsheets activity to see all data. Admitting Diagnosis: Septic shock ([facility] [A41.9, R65.21] Perforated viscus [R19.8] Pertinent PMHx: COPD, HTN, DM, hepatitis C, sarcoidosis, methadone use Education Needs:        [x] None identified  [] Identified - Not appropriate at this time  []  Identified and addressed - refer to education log Learning Limitations:   [] None identified  [x] Identified: altered mentation Cultural, Voodoo & ethnic food preferences:  [x] None identified    [] Identified and addressed ESTIMATED NUTRITION NEEDS:  
 
Calories: 946-1204 kcal (11-14 kcal/kg) based on  [x] Actual BW 86 kg     [] IBW Protein: 110-138 gm (2-2.5 gm/kg) based on  [] Actual BW      [x] IBW 55 kg Fluid: 1 mL/kcal 
  
MONITORING & EVALUATION:  
 
Nutrition Goal(s):  
- Nutritional needs will be met through adequate oral intake or nutrition support within the next 7 days. Outcome: Progressing towards goal  
 
Monitoring:   [x] Food and nutrient intake   [x] Food and nutrient administration  [x] Comparative standards   [x] Nutrition-focused physical findings   [x] Anthropometric Measurements   [x] Treatment/therapy   [x] Biochemical data, medical tests, and procedures Previous Recommendations (for follow-up assessments only):  Not Implemented (RD to address) Discharge Planning: Nutritional discharge needs unknown at this time. Participated in care planning, discharge planning, & interdisciplinary rounds as appropriate. Ortega Mcdaniel RD, [facility] Pager: [phone]

## 2020-03-04 NOTE — PROGRESS NOTES
Bedside and Verbal shift change report given to Pradip Huynh RN (oncoming nurse) by Sahra Barron 
 (offgoing nurse). Report included the following information SBAR, Kardex, ED Summary, OR Summary, Procedure Summary, Intake/Output, MAR and Recent Results. Visit Vitals /64 Pulse 85 Temp (!) 100.8 °F (38.2 °C) Resp 16 Ht 5' 4\" (1.626 m) Wt 87.1 kg (192 lb) SpO2 97% BMI 32.96 kg/m²

## 2020-03-04 NOTE — PROGRESS NOTES
*ATTENTION:  This note has been created by a medical student for educational purposes only. Please do not refer to the content of this note for clinical decision-making, billing, or other purposes. Please see attending physicians note to obtain clinical information on this patient. * 3 [facility]. Pulmonary, Critical Care, and Sleep Medicine Name: Apryl Medrano MRN: [id number] : 1955 Hospital: [address] [place] [address] Dr Date: 3/4/2020  Admission Date: 2020 Chart and notes reviewed. Data reviewed. I have evaluated all findings. [x]I have reviewed the flowsheet and previous days notes. [x]The patient is unable to give any meaningful history or review of systems because the patient is: 
[x]Intubated [x]Sedated [x]Unresponsive [x]The patient is critically ill on     
[x]Mechanical ventilation [x]Pressors []BiPAP [] Interval HPI: 
No acute events overnight. Lasix 20 given with 2.8 L out overnight. Subjective 20 Hospital Day:4 Vent Day:4 Overnight events: none Mentation/Activity: sedated Respiratory/ Secretions: on vent Hemodynamics: stable Urine output, bowel: serosanguinous ostomy output Diet: NPO Need for procedures: None ROS:Review of systems not obtained due to patient factors. Events and notes from last 24 hours reviewed. Care plan discussed on multidisciplinary rounds. Patient Active Problem List  
Diagnosis Code  Diabetic neuropathy associated with type 2 diabetes mellitus (HCC) E11.40  Venous insufficiency I87.2  Obesity, Class I, BMI 30-34.9 E66.9  Chronic back pain M54.9, G89.29  
 Generalized osteoarthritis of multiple sites M15.9  Bipolar affective disorder ([facility] F31.9  Abnormally low high density lipoprotein (HDL) cholesterol with hypertriglyceridemia E78.6, E78.1  Diastolic dysfunction without heart failure I51.89  
  Chronic obstructive pulmonary disease (COPD) ([facility] J44.9  Hypertensive heart disease without heart failure I11.9  Depression F32.9  History of back injury Z87.828  Type 2 diabetes mellitus with diabetic neuropathy, with long-term current use of insulin ([facility]) E11.40, Z79.4  Anxiety F41.9  Wears glasses Z97.3  History of hepatitis C Z86.19  
 Sickle cell trait ([facility]) D57.3  History of acute renal failure Z87.448  Hypothyroidism E03.9  Recurrent genital herpes A60.00  Sarcoidosis D86.9  Abscess of right arm L02.413  Hypoxemia requiring supplemental oxygen R09.02, Z99.81  
 Abnormal nuclear stress test R94.39  
 Cellulitis and abscess of hand L03.119, L02.519  
 Cellulitis and abscess of foot L03.119, L02.619  
 Cellulitis of arm, right L03. 113  
 Olecranon bursitis of right elbow M70.21  
 Contusion of left elbow S50. 02XA  Intravenous drug user F19.90  Right Achilles tendinitis M76.61  
 History of penicillin allergy Z88.0  Falls frequently R29.6  Gastroesophageal reflux disease with hiatal hernia K21.9, K44.9  Memory difficulty R41.3  Mixed connective tissue disease ([facility] M35.1  Impaired mobility and ADLs Z74.09  
 Hyperuricemia E79.0  History of vitamin D deficiency Z86.39  
 Urge urinary incontinence N39.41  Spinal cord compression ([facility]) G95.20  Compression fracture of body of thoracic vertebra ([facility]) S22.000A  Status post laminectomy with spinal fusion Z98.1  Acute blood loss as cause of postoperative anemia D62  
 History of fracture due to fall Z87.81  Chronic respiratory failure with hypoxia ([facility]) J96.11  
 Cellulitis of right forearm L03. 113  
 Gout M10.9  Menopause Z78.0  Long term current use of aspirin Z79.82  
 Opioid dependence ([facility]) F11.20  Tobacco use disorder F17.200  Sepsis ([facility] A41.9  Perforated viscus R19.8  Septic shock ([facility]) A41.9, R65.21 Vital Signs: 
Visit Vitals /71 Pulse (!) 57 Temp 97 °F (36.1 °C) Resp 16 Ht 5' 4\" (1.626 m) Wt 88.1 kg (194 lb 3.6 oz) LMP 2012 (Exact Date) SpO2 99% BMI 33.34 kg/m² O2 Device: Ventilator, Endotracheal tube Temp (24hrs), Av.4 °F (37.4 °C), Min:96.2 °F (35.7 °C), Max:101.6 °F (38.7 °C) Intake/Output:  
Last shift:      701 - 1900 In: -  
Out: 863 [DIVYQ:989] Last 3 shifts: 1901 -  07 In: 4075.9 [I.V.:3965.9] Out: 5560 [XQKGD:6380] Intake/Output Summary (Last 24 hours) at 3/4/2020 6215 Last data filed at 3/4/2020 0248 Gross per 24 hour Intake 1763.17 ml Output 3980 ml Net -2216.83 ml Ventilator Settings: 
Ventilator Mode: Assist control, VC+ Respiratory Rate Resp Rate Observed: 27 Back-Up Rate: 16 Insp Time (sec): 1.15 sec Insp Flow (l/min): 1.15 l/min I:E Ratio: 1;2.3 Ventilator Volumes Vt Set (ml): 450 ml Vt Exhaled (Machine Breath) (ml): 491 ml Vt Spont (ml): 507 ml Ve Observed (l/min): 7.85 l/min Ventilator Pressures Pressure Support (cm H2O): 7 cm H2O 
PIP Observed (cm H2O): 24 cm H2O Plateau Pressure (cm H2O): 21 cm H2O 
MAP (cm H2O): 13 PEEP/VENT (cm H2O): 8 cm H20 Auto PEEP Observed (cm H2O): 0 cm H2O Current Facility-Administered Medications Medication Dose Route Frequency  potassium chloride 10 mEq in 100 ml IVPB  10 mEq IntraVENous Q1H  
 piperacillin-tazobactam (ZOSYN) 4.5 g in 0.9% sodium chloride (MBP/ADV) 100 mL MBP  4.5 g IntraVENous Q6H  
 heparin (porcine) injection 5,000 Units  5,000 Units SubCUTAneous Q8H  propofol (DIPRIVAN) 10 mg/mL infusion  0-50 mcg/kg/min IntraVENous TITRATE  amiodarone (NEXTERONE) 360 mg in dextrose 200 mL (1.8 mg/mL) infusion  0.5-1 mg/min IntraVENous TITRATE  chlorhexidine (PERIDEX) 0.12 % mouthwash 10 mL  10 mL Oral Q12H  pantoprazole (PROTONIX) 40 mg in 0.9% sodium chloride 10 mL injection  40 mg IntraVENous Q12H  fentaNYL (PF) 900 mcg/30 ml infusion soln  0-200 mcg/hr IntraVENous TITRATE  hydrocortisone Sod Succ (PF) (SOLU-CORTEF) injection 50 mg  50 mg IntraVENous Q6H  
 albuterol-ipratropium (DUO-NEB) 2.5 MG-0.5 MG/3 ML  3 mL Nebulization Q4H RT  
 budesonide (PULMICORT) 500 mcg/2 ml nebulizer suspension  500 mcg Nebulization BID RT  
 insulin lispro (HUMALOG) injection   SubCUTAneous Q6H  
 anidulafungin (ERAXIS) 100 mg in 0.9% sodium chloride 130 mL IVPB  100 mg IntraVENous Q24H  
 NOREPINephrine (LEVOPHED) 32 mg in 5% dextrose 250 mL infusion  0.5-30 mcg/min IntraVENous TITRATE Telemetry: []Sinus []A-flutter []Paced []A-fib []Multiple PVCs Physical Exam:  
  
General: Intubated/sedated; HEENT:  Anicteric sclerae; pink palpebral conjunctivae; mucosa moist 
Resp:  Symmetrical chest expansion, no accessory muscle use; good airway entry; no rales/ wheezing/ rhonchi noted CV:  S1, S2 present; regular rate and rhythm GI:  Abdomen soft, non-tender; unchanged mod distention. No bowel sounds. Dressing clean and dry- recently changed, wound not visualized Extremities:  +2 pulses on all extremities; significant edema bilateral LL and bilateral hands. no cyanosis/ clubbing noted. b/l wrist restraints Skin:  Warm; no rashes/ lesions noted, normal turgor/cap refill Neurologic:  Non-focal 
Devices:  No NGT/OGT, Central line/ PICC, ETT/tracheostomy, chest tube DATA: 
MAR reviewed and pertinent medications noted or modified as needed Labs: 
Recent Labs 03/04/20 
0410 03/03/20 
0440 03/02/20 
0410 WBC 23.9* 26.1* 20.8* HGB 9.0* 9.5* 10.7* HCT 28.1* 28.6* 32.7*  
PLT 98* 168 156 Recent Labs 03/04/20 
0410 03/03/20 
0440 03/02/20 
1420 * 143 145  
K 2.5* 3.9 4.0  
 112* 114* CO2 26 24 22 * 168* 101* BUN 19* 18 18 CREA 0.84 0.97 0.97  
CA 7.7* 7.4* 7.3*  
MG 2.0 2.1 2.2 PHOS 3.4 3.5 3.7 ALB 1.7* 1.7* 2.0*  
SGOT 22 55* 44* ALT 14 21 16 No results for input(s): PH, PCO2, PO2, HCO3, FIO2 in the last 72 hours. Recent Labs 03/04/20 
0407 03/03/20 
1018 03/03/20 
0427 FIO2I 55 60 60 HCO3I 30.8* 25.8 22.4 PCO2I 46.8* 45.4* 42.6 PHI 7.426 7.367 7.331* PO2I 111* 96 73* Imaging: 
[x]   I have personally reviewed the patients radiographs and reports XR Results (most recent): 
Results from [facility] Encounter encounter on 02/29/20 XR CHEST PORT Narrative CHEST PORTABLE 0453 hours CPT CODE: 31208 COMPARISON: March 2. INDICATIONS: Intubated FINDINGS:  
 
Portable single view chest demonstrates: 
 
Lungs: Diffuse pulmonary parenchymal opacities are again noted. Bullous changes 
are evident in the right upper lung. Cardiac Silhouette And Mediastinal Contours: Moderately enlarged for the 
severity of chronic pulmonary disease. Pleural Spaces: No pneumothorax or definite pleural effusion evident. Bones And Soft Tissues: Unremarkable for age. Endotracheal tube is present with the tip 4 cm above the alyssa. Right IJ CVL 
and nasogastric tube are in satisfactory position Impression IMPRESSION: 
 
Radiographically stable and unchanged cardiopulmonary appearance. Candance Huger CT Results (most recent): 
Results from [facility] Encounter encounter on 02/29/20 CT CHEST ABD PELV W CONT Narrative CT SCAN OF THE CHEST, ABDOMEN AND PELVIS, WITH CONTRAST INDICATION: Above. Arrived with shortness of breath and abdominal pain. Septic 
shock. Hypoxia. History of sarcoidosis/COPD. Altered. Abdominal distention and 
tympani. Intraperitoneal free air on chest radiograph. Perforated viscus. COMPARISON: Chest CT 1/21/2010. No prior abdominopelvic CT in PACS. Report is 
noted in the electronic medical record (care everywhere) of previous noncontrast 
enhanced abdominopelvic CT performed elsewhere 7/21/2014.  
 
TECHNIQUE: With IV administration of 70 mL Isovue-300, without administration of 
 oral contrast, helically acquired serial axial CT images through the chest, 
abdomen and pelvis were obtained. Additional coronal and sagittal reformation 
images were also performed. All CT scans at this facility are performed using dose optimization technique as 
appropriate to the performed exam, to include automated exposure control, 
adjustment of the mA and/or kV according to patient's size (Including 
appropriate matching for site-specific examinations), or use of iterative 
reconstruction technique. FINDINGS: 
 
CT chest:  
 
Endotracheal tube tip is in place cephalad to the level of the alyssa. Esophagogastric tube is noted, tip in the mid stomach. Severe pulmonary emphysematous disease again seen with extensive bullous changes 
most conspicuous in the upper lungs. Scattered scarring. There has been interval 
development of consolidative appearing lung opacities in the lower lobes 
bilaterally consistent with airspace disease likely in addition to atelectasis. The small stable subpleural pulmonary nodule described in the right lower lobe 
on the recent chest CT is again seen, slightly better visualized on the current 
study measuring approximately 4 mm, unchanged compared to the CT of 2/6/2018 
(axial 34). Multifocal chronic nodular pleuro-parenchymal scarring in the left 
upper lobe without significant interval change compared to the preceding chest 
CT or study of 2/6/2018. No definite suspicious new pulmonary nodule/mass identified compared to the 
preceding CT. No evidence of pathologic lymph node enlargement is seen. No evidence of pleural or significant pericardial effusion. CT abdomen/pelvis:  
 
Small ascites, best seen around the liver and in the anterior pelvis adjacent to 
the distal descending and sigmoid colon. The spleen is heterogeneous in attenuation and demonstrates several rounded 
hypodensities as detailed on the recent chest CT of 1/21/2020. Indeterminate attenuation masses in the kidneys bilaterally, 2.7 cm right kidney 
upper to midpole laterally, 2.1 cm left kidney interpolar region posteriorly. Further evaluation recommended, beginning with ultrasound might be helpful when 
clinically feasible if the corresponding lesions can be identified 
sonographically. Additional smaller subcentimeter renal hypodensities 
bilaterally, possibly cysts, too small to be specifically characterized. The liver, gallbladder, pancreas, and adrenal glands appear unremarkable. Scattered calcifications in the abdominal aorta and its major branches. The 
abdominal aorta enhances normally without abdominal aortic aneurysm. A few scattered small subcentimeter short axis lymph nodes bilaterally in the 
pelvis or retroperitoneum. No evidence of pathologic lymph node enlargement is 
seen. Moderate quantity of intraperitoneal free air in the nondependent anterior 
abdomen, consistent with perforated viscus. Additional scattered foci of free 
air elsewhere in the peritoneum and mesentery, including in the right upper 
quadrant near the gallbladder/duodenum, and scattered bilaterally in the 
mesenteric/peritoneal fat of the upper and lower abdomen. Grouped small foci of 
intraperitoneal free air are noted close to the colonic wall along the 
left/anterior side of the sigmoid colon (reference axial 112-119). The left 
hemicolon is diffusely mildly distended to the rectum containing gas, stool, and 
hyperdense material. Clinical correlation might be helpful regarding recent 
ingestion of hyperdense material. The rectum measures approximately 8.5 cm in 
caliber. The sigmoid measures approximately 7.3 cm in caliber on axial image 115. The site of the or free viscus is uncertain. Common sites could include 
duodenal or gastric perforation such as may be seen due to perforated ulcer. However, there is small focal hypoattenuation along the wall of the sigmoid colon on series 2 axial images 111-113 which could potentially reflect a small 
mural defect such as could be seen related to constipation, stercoral colitis, 
diverticular disease. The appendix is seen within the ascites in the right lower quadrant, assessment 
for stranding in the periappendiceal fat limited by the ascites, without gross 
abnormal thickening of the appendix seen. No gas within the appendix. The right 
hemicolon appears grossly unremarkable. There is mild diffuse mural thickening of small bowel loops and mild prominence 
of enhancement, nonspecific. No definite associated pneumatosis. No portal 
venous gas is seen. The SMA/SMV appear to maintain usual orientation. Broadly 
speaking differential considerations could include inflammatory, infectious, 
ischemic. Clinical correlation is recommended including with lactate 
measurement. Conceivably sequela of peritonitis in the setting of bowel 
perforation? Uterus is present. Small gas is noted within the uterus and vagina, nonspecific, 
can be seen as a physiologic finding. On review of bone windows, there is a superior endplate compression fracture 
with mildly to moderately decreased vertebral body height at L2. Bilateral 
spinal fusion rods spanning from J0-P2-B03-T12. Severe compression fracture 
deformity again seen at T9. Compression fracture with rather pronounced 
decreased vertebral body height again seen at T5. Mild endplate indentations at 
several other levels including T4, T6, T10. The posterior midline fluid 
collection described on the preceding study was better visualized on the 
preceding study, extensive metallic hardware artifacts noted. Impression Impression: Moderate quantity of intraperitoneal free air. Findings are consistent with 
perforated viscus.  The site of perforation is uncertain, as discussed above, 
possibly the sigmoid colon where there is apparent small subtle focal 
 hypoattenuation along the wall as detailed above (reference axial images 111-113) close to region where several foci of free air are grouped. The left 
hemicolon is mildly distended and filled with hyperdense appearing stool. Mild diffuse mural thickening and enhancement of the small bowel as described. Small volume of ascites best seen around the liver and in the pelvis adjacent to 
the sigmoid. Bibasilar consolidations, right greater than left, suspicious for airspace 
disease/pneumonia. Indeterminate attenuation bilateral renal masses, follow-up evaluation 
recommended. Splenic hypodensities again seen as described on recent chest CT. Intubated. Severe pulmonary emphysematous disease and fibrotic changes. Multiple otherwise nonacute findings as detailed above. I have discussed these results directly with Dr. Serenity Shepherd in the ED at 12:20 p.m. 
in addition to the patient's consulted surgeon at 12:25 PM. IMPRESSION:  
· Patient Active Problem List  
Diagnosis Code  Diabetic neuropathy associated with type 2 diabetes mellitus (HCC) E11.40  Venous insufficiency I87.2  Obesity, Class I, BMI 30-34.9 E66.9  Chronic back pain M54.9, G89.29  
 Generalized osteoarthritis of multiple sites M15.9  Bipolar affective disorder ([facility] F31.9  Abnormally low high density lipoprotein (HDL) cholesterol with hypertriglyceridemia E78.6, E78.1  Diastolic dysfunction without heart failure I51.89  Chronic obstructive pulmonary disease (COPD) ([facility] J44.9  Hypertensive heart disease without heart failure I11.9  Depression F32.9  History of back injury Z87.828  Type 2 diabetes mellitus with diabetic neuropathy, with long-term current use of insulin (HCC) E11.40, Z79.4  Anxiety F41.9  Wears glasses Z97.3  History of hepatitis C Z86.19  
 Sickle cell trait (HCC) D57.3  History of acute renal failure Z87.448  Hypothyroidism E03.9  Recurrent genital herpes A60.00  Sarcoidosis D86.9  Abscess of right arm L02.413  Hypoxemia requiring supplemental oxygen R09.02, Z99.81  
 Abnormal nuclear stress test R94.39  
 Cellulitis and abscess of hand L03.119, L02.519  
 Cellulitis and abscess of foot L03.119, L02.619  
 Cellulitis of arm, right L03. 113  
 Olecranon bursitis of right elbow M70.21  
 Contusion of left elbow S50. 02XA  Intravenous drug user F19.90  Right Achilles tendinitis M76.61  
 History of penicillin allergy Z88.0  Falls frequently R29.6  Gastroesophageal reflux disease with hiatal hernia K21.9, K44.9  Memory difficulty R41.3  Mixed connective tissue disease ([facility] M35.1  Impaired mobility and ADLs Z74.09  
 Hyperuricemia E79.0  History of vitamin D deficiency Z86.39  
 Urge urinary incontinence N39.41  Spinal cord compression ([facility]) G95.20  Compression fracture of body of thoracic vertebra ([facility]) S22.000A  Status post laminectomy with spinal fusion Z98.1  Acute blood loss as cause of postoperative anemia D62  
 History of fracture due to fall Z87.81  Chronic respiratory failure with hypoxia ([facility]) J96.11  
 Cellulitis of right forearm L03. 113  
 Gout M10.9  Menopause Z78.0  Long term current use of aspirin Z79.82  
 Opioid dependence ([facility]) F11.20  Tobacco use disorder F17.200  Sepsis ([facility] A41.9  Perforated viscus R19.8  Septic shock ([facility]) A41.9, R65.21 IMPRESSION: · Acute on Chronic Respiratory Failure - Emergently intubated in ED for airway protection, 2/2 below. Now post-op and remains on mechanical ventilation. Chronically on 3L NC at home. · Acute Sigmoid Colon Perforation - s/p Emergent Ex-Lap w/ drainage of intraabdominal stool and collections; sigmoid colectomy with Dr. Tamara Escamilla on 02/29/20. Subsequent washout and abdominal closure on 03/01/20. · Pyrexia - Fever as high as 100.9 F today. · Tachyarrhythmia - sinus tachycardia going into atrial flutter; digoxin unsuccessful, bolused with Amio and gtt started; pt became hypotensive w/ Amio, requiring escalation of pressors. · Septic Shock with MSOF - 2/2 above. Improving. Currently on Levophed,  Wil-Synephrine. · Peritonitis - 2/2 above - Sigmoid Colon Perforation with large intraabdominal stool burden. · Bandemia - Initially with 22% bands. Was shifting to leukocytosis, but today (3/3) spiked back up. ID is following. · Lactic Acidosis - 2/2 above. Now resolved. · Metabolic Acidosis - 2/2 above · ELIF - Likely pre-renal, 2/2 above · UTI - UC grew E.coli · Lytes - Currently resolved, pt on electrolyte replacement protocol · T2DM - Pt on Lantus 20u qhs at home. · Paraparesis, in wheelchair: Recent Hx of Spine Thoracic Laminectomy T6-T11 with Fusion (12/11/20) with Dr. Niels Allen 2/2 Acute compression fx of T9-T10 and spinal cord compression at T9 and T10 and spinal cord edema with subsequent paraparesis. · Hx of Sarcoidosis - Chronic steroid use. · Hx of COPD - Follows with Dr. Chris Collins in clinic. · Hx of Cocaine and Heroin Abuse - Noted to be on Methadone therapy at home. RECOMMENDATIONS:  
Neuro: Fentanyl and Propofol gtt for pain control and sedation. Titrate sedation for RASS goal -3 to -4 due to post-op status. RN reports pt became agitated and tachycardic during wean trial. 
Pulm: Aspiration precautions, HOB>30'. VAP Bundle Titrate FiO2 O2 for SpO2 >88-92%; optimize bronchial hygiene. Duo-nebs q4 and Albuterol nebs q6 PRN. Pulmicort nebs BID. Daily CXR and ABG while intubated. Wean FiO2 as tolerated. CVS: Monitor HD, MAP goal >65. Continue Amiodarone 0.5mg/min. Wean pressors. Repeat Echo. GI: NPO. Not appropriate for enteral nutrition at this time per surgery. Renal: Trend Cr, UOP. Renae (+). UC grew E.coli. Pt is on Zosyn. Hem/Onc: Trend H/H, monitor for s/o active bleeding. Check CBC and PT/INR post-op. I/D: Trend WBCs and temperature curve. Tylenol prn. Lactic acid normalized. Pt with leukocytosis, bandemia resolved. Sepsis bundle per hospital protocol, BxCx currently growing GNR, UC came back with E.coli. Infectious disease is following the patient and increased Zosyn dosage today. Continue Eraxis for antifungal ppx per ID, pt is at high risk for fungemia with bowel perforation. Surgery and ID following. Metabolic: Daily BMP, mag, phos. Trend lytes and replace per protocol. Endocrine: Q6 glucoses. SSI. Avoid hypoglycemia. Musc/Skin: General surgery managing surgical incision care. Full Code Discussed in interdisciplinary rounds  
  Best Practices/Safety Bundles: 
· Sepsis Bundle per Hospital Protocol · Glycemic control; avoid Hypoglycemia · IHI ICU Bundles: ·      Central Line Bundle Followed , Rousseau Bundle Followed and Vent Bundle Followed, Vent Day 3 
· [facility] Vent patients/ Pulmonary pts:  
· VAP bundle, Aim to keep peak plateau pressure 87-21VW H2O 
· Aspiration Precautions - HOB >30' · Daily sedation holiday as indicated · SBT as tolerated/appropriate · Stress ulcer prophylaxis: Protonix · DVT prophylaxis: SCDs, heparin SQ 
· Need for Lines, rousseau assessed. · Restraints need. High complexity decision making was performed during this consultation and evaluation. [x]       Pt is at high risk for further organ failure and dysfunction. Critical care time spent:    minutes with patient exclusive of procedures. Jennifer Cardenas 
03/04/20 Pulmonary, Critical Care Medicine [facility]

## 2020-03-04 NOTE — PROGRESS NOTES
Infectious Disease progress Note Reason: sepsis, gram negative bacteremia,  
 
Current abx Prior abx Pip/tazo since 3/1 Cefepime, metronidazole 2/29-3/1 Vancomycin 2/29-3/2/20 Lines:  
 
 
Assessment : 
 
62 y.o. 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 Clinical presentation c/w septic shock (POA) due to gram negative bloodstream infection (positive blood culture 2/29), sigmoid perforation, secondary peritonitis s/p emergent Ex lap on 2/29/20. Intra operatively found to have sigmoid colon perforation, 2/2 constipation with large amount of stool burden spilling into the peritoneum per Dr. Heather Jennings. S/p Re-exploratory laparotomy, drainage of intra-abdominal stool collection, descending colectomy with mobilization of the splenic flexure, and transverse colostomy on 3/1. Significant pyuria on UA 2/29. Urine culture >100,000 e.coli suggestive of probable cystitis. Acute on chronic respiratory failure - likely due to sepsis. Sputum culture 3/1- moraxella (beta lactamase positive). Diffuse pulmonary opacities concerning for aspiration pneumonia. Persistent high pressor requirement - ?due to severe gram negative sepsis. Evidence of right heart failure noted on echo. Decreased pressor requirement overnight. abx management complicated due to h/o pcn allergy. Currently tolerating pip/tazo without difficulties. Recommendations: 1. continue pip/tazo 2. discontinue anidulafungin - start fluconazole 3. Titrate pressors as tolerated 4. Send serum beta d glucan 5. F/u echo 6.  F/u id of gnr in blood culture 2/29, f.u blood culture 3/3 
 7. patient is at high risk for intra abdominal abscess. Above plan was discussed in details with dr. Richard Parrish. Please call me if any further questions or concerns. Will continue to participate in the care of this patient. HPI: 
 detailed ros not feasible since patient intubated. home Medication List  
 Details  
roflumilast (DALIRESP) 250 mcg tab Take 250 mcg by mouth daily. Qty: 30 Tab, Refills: 0  
  
ARIPiprazole (ABILIFY) 10 mg tablet Take 1 Tab by mouth. aspirin-calcium carbonate 81 mg-300 mg calcium(777 mg) tab Take 1 Tab by mouth. divalproex DR (DEPAKOTE) 250 mg tablet Take 1 Tab by mouth. doxycycline (MONODOX) 100 mg capsule   
  
gabapentin (NEURONTIN) 300 mg capsule Take 1 Cap by mouth. ipratropium (ATROVENT) 0.02 % soln   
  
methylPREDNISolone (MEDROL DOSEPACK) 4 mg tablet   
  
oxyCODONE-acetaminophen (PERCOCET) 5-325 mg per tablet Take 1 Tab by mouth. tolterodine (DETROL) 1 mg tablet Take 1 Tab by mouth. traMADol (ULTRAM) 50 mg tablet Take 1 Tab by mouth. traZODone (DESYREL) 100 mg tablet Take 1 Tab by mouth. !! busPIRone (BUSPAR) 15 mg tablet Take 15 mg by mouth two (2) times a day. Indications: repeated episodes of anxiety Qty: 60 Tab, Refills: 2 OXYGEN-AIR DELIVERY SYSTEMS 3 L/min by Nasal route continuous. First Choice O2  
  
albuterol-ipratropium (DUO-NEB) 2.5 mg-0.5 mg/3 ml nebu 3 mL by Nebulization route every six (6) hours as needed for Wheezing. Qty: 30 Nebule, Refills: 1  
  
!! busPIRone (BUSPAR) 10 mg tablet Take 10 mg by mouth three (3) times daily. Indications: repeated episodes of anxiety  
  
cholecalciferol (VITAMIN D3) (1000 Units /25 mcg) tablet Take 1,000 Units by mouth two (2) times a day. levothyroxine (SYNTHROID) 100 mcg tablet Take 100 mcg by mouth Daily (before breakfast). insulin glargine (LANTUS U-100 INSULIN) 100 unit/mL injection 20 Units by SubCUTAneous route Daily (before breakfast). famotidine (PEPCID) 20 mg tablet Take 20 mg by mouth nightly. aspirin 81 mg chewable tablet Take 1 Tab by mouth daily (with breakfast). Indications: stroke prevention 
Qty: 30 Tab, Refills: 0  
  
docusate sodium (COLACE) 100 mg capsule Take 1 Cap by mouth daily (after breakfast). Indications: constipation 
Qty: 30 Cap, Refills: 0  
  
predniSONE (DELTASONE) 20 mg tablet Take 20 mg by mouth daily (with breakfast). Indications: sarcoidosis Qty: 30 Tab, Refills: 0 Associated Diagnoses: Sarcoidosis  
  
ascorbic acid, vitamin C, (VITAMIN C) 250 mg tablet Take 1 Tab by mouth daily (with breakfast). Qty: 30 Tab, Refills: 0 Associated Diagnoses: Acute blood loss as cause of postoperative anemia  
  
calcium citrate 200 mg (950 mg) tablet Take 1 Tab by mouth two (2) times a day. Indications: post-menopausal osteoporosis prevention 
Qty: 60 Tab, Refills: 0 Associated Diagnoses: Status post laminectomy with spinal fusion  
  
ferrous sulfate 325 mg (65 mg iron) tablet Take 1 Tab by mouth daily (with breakfast). Qty: 30 Tab, Refills: 0 Associated Diagnoses: Acute blood loss as cause of postoperative anemia  
  
acetaminophen (TYLENOL) 325 mg tablet Take 2 Tabs by mouth every four (4) hours as needed for Pain. Indications: pain 
Qty: 60 Tab, Refills: 0 Associated Diagnoses: Status post laminectomy with spinal fusion  
  
brief disposable (ADULT) misc by Does Not Apply route. Qty: 1 Package, Refills: 0 Associated Diagnoses: Urge urinary incontinence  
  
methadone (DOLOPHINE) 5 mg tablet Take 4 Tabs by mouth daily. Max Daily Amount: 20 mg. 
Qty: 10 Tab, Refills: 0 Associated Diagnoses: History of back injury  
  
polyethylene glycol (MIRALAX) 17 gram/dose powder Take 17 g by mouth daily. 1 tablespoon with 8 oz of water daily 
Qty: 289 g, Refills: 0  
  
umeclidinium-vilanterol (ANORO ELLIPTA) 62.5-25 mcg/actuation inhaler Take 1 Puff by inhalation daily. Qty: 1 Inhaler, Refills: 0 BD INSULIN SYRINGE ULTRA-FINE 1 mL 31 gauge x 5/16 syrg   
  
rosuvastatin (CRESTOR) 5 mg tablet TAKE ONE TABLET NIGHTLY Qty: 90 Tab, Refills: 3  
  
albuterol (PROAIR HFA) 90 mcg/actuation inhaler INHALE 2 PUFFS EVERY 4 HOURS AS NEEDED FOR WHEEZING Qty: 1 Inhaler, Refills: 5  
  
oxybutynin (DITROPAN) 5 mg tablet Take 5 mg by mouth two (2) times a day. allopurinol (ZYLOPRIM) 100 mg tablet Take 100 mg by mouth daily. insulin lispro (HUMALOG) 100 unit/mL injection To be given 15 min before meals: < 150 - None, 151-200 - 2 u, 201-250 - 4 u, 251-300 - 6 u, 301-350 - 8 u, 351-400 - 10 u, >400 - 12 u Qty: 1 Vial, Refills: 0 Associated Diagnoses: Type 2 diabetes mellitus with stage 3 chronic kidney disease, with long-term current use of insulin ([facility])  
  
insulin syringe,safetyneedle 1 mL 30 gauge x 5/16\" syrg As directed Qty: 50 Each, Refills: 0 Nebulizer Accessories Kit Use with nebulizer machine Qty: 1 Kit, Refills: prn  
 Associated Diagnoses: COPD (chronic obstructive pulmonary disease) ([facility])  
  
sertraline (ZOLOFT) 50 mg tablet Take 50 mg by mouth daily. Indications: Bipolar Depression Current Facility-Administered Medications Medication Dose Route Frequency  potassium chloride 10 mEq in 100 ml IVPB  10 mEq IntraVENous Q1H  
 furosemide (LASIX) injection 20 mg  20 mg IntraVENous ONCE  potassium chloride 20 mEq in 50 ml IVPB  20 mEq IntraVENous ONCE  piperacillin-tazobactam (ZOSYN) 4.5 g in 0.9% sodium chloride (MBP/ADV) 100 mL MBP  4.5 g IntraVENous Q6H  
 acetaminophen (TYLENOL) solution 650 mg  650 mg Per NG tube Q4H PRN  
 heparin (porcine) injection 5,000 Units  5,000 Units SubCUTAneous Q8H  propofol (DIPRIVAN) 10 mg/mL infusion  0-50 mcg/kg/min IntraVENous TITRATE  amiodarone (NEXTERONE) 360 mg in dextrose 200 mL (1.8 mg/mL) infusion  0.5-1 mg/min IntraVENous TITRATE  diphenhydrAMINE (BENADRYL) injection 25 mg  25 mg IntraVENous Q4H PRN  
  sodium chloride (NS) flush 5-10 mL  5-10 mL IntraVENous PRN  chlorhexidine (PERIDEX) 0.12 % mouthwash 10 mL  10 mL Oral Q12H  
 sodium chloride (NS) flush 5-40 mL  5-40 mL IntraVENous PRN  pantoprazole (PROTONIX) 40 mg in 0.9% sodium chloride 10 mL injection  40 mg IntraVENous Q12H  
 fentaNYL (PF) 900 mcg/30 ml infusion soln  0-200 mcg/hr IntraVENous TITRATE  midazolam (VERSED) injection 1-2 mg  1-2 mg IntraVENous Q30MIN PRN  
 fentaNYL citrate (PF) injection  mcg   mcg IntraVENous Q30MIN PRN  
 hydrocortisone Sod Succ (PF) (SOLU-CORTEF) injection 50 mg  50 mg IntraVENous Q6H  
 albuterol-ipratropium (DUO-NEB) 2.5 MG-0.5 MG/3 ML  3 mL Nebulization Q4H RT  
 albuterol (ACCUNEB) nebulizer solution 1.25 mg  1.25 mg Nebulization Q6H PRN  
 budesonide (PULMICORT) 500 mcg/2 ml nebulizer suspension  500 mcg Nebulization BID RT  
 insulin lispro (HUMALOG) injection   SubCUTAneous Q6H  
 glucose chewable tablet 16 g  4 Tab Oral PRN  
 glucagon (GLUCAGEN) injection 1 mg  1 mg IntraMUSCular PRN  
 dextrose (D50W) injection syrg 12.5-25 g  25-50 mL IntraVENous PRN  
 anidulafungin (ERAXIS) 100 mg in 0.9% sodium chloride 130 mL IVPB  100 mg IntraVENous Q24H  
 NOREPINephrine (LEVOPHED) 32 mg in 5% dextrose 250 mL infusion  0.5-30 mcg/min IntraVENous TITRATE Allergies: Moxifloxacin; Pcn [penicillins]; and Sulfa (sulfonamide antibiotics) Temp (24hrs), Av °F (37.2 °C), Min:96.2 °F (35.7 °C), Max:101.6 °F (38.7 °C) Visit Vitals /63 Pulse 78 Temp 98.2 °F (36.8 °C) Resp 16 Ht 5' 4\" (1.626 m) Wt 88.1 kg (194 lb 3.6 oz) LMP 2012 (Exact Date) SpO2 98% BMI 33.34 kg/m² ROS: unable to obtain Physical Exam: 
 
General:  Intubated/Sedated, NAD Head:  Normocephalic, without obvious abnormality, atraumatic. Eyes:  Conjunctivae/corneas clear. PERRL, Nose: Nares normal. Septum midline. Mucosa normal. No drainage or sinus tenderness. Throat: Lips, mucosa, and tongue normal. Teeth and gums normal.  
Neck: Supple, symmetrical, trachea midline, no adenopathy, no carotid bruit and no JVD. Lungs:   Symmetrical chest rise; good AE bilat; course throughout; no wheezes/rales noted. Heart:  RRR, S1, S2 normal, no m/r/g Abdomen:   Soft, appropriately tender. Distended. Non-rigid. +midline surgical incision, dressing c/d/i. MOHINDER drain to wall suction with mild sanguinous output. Absent bowel sounds. No masses,  No organomegaly. Extremities: Extremities normal, atraumatic, no cyanosis or edema. Pulses: 2+ and symmetric all extremities. Skin: Skin color, texture, turgor normal. No rashes or lesions Neurologic: Sedated Labs: Results:  
Chemistry Recent Labs 03/04/20 
0410 03/03/20 
0440 03/02/20 
1420 * 168* 101* * 143 145  
K 2.5* 3.9 4.0  
 112* 114* CO2 26 24 22 BUN 19* 18 18 CREA 0.84 0.97 0.97  
CA 7.7* 7.4* 7.3* AGAP 13 7 9 BUCR 23* 19 19  125* 73  
TP 5.6* 5.1* 4.3* ALB 1.7* 1.7* 2.0*  
GLOB 3.9 3.4 2.3 AGRAT 0.4* 0.5* 0.9  
  
CBC w/Diff Recent Labs 03/04/20 
0410 03/03/20 
0440 03/02/20 
0410 WBC 23.9* 26.1* 20.8*  
RBC 3.34* 3.39* 3.93* HGB 9.0* 9.5* 10.7* HCT 28.1* 28.6* 32.7*  
PLT 98* 168 156 GRANS 89* 70 73 LYMPH 5* 3* 2* EOS 0 0 0 Microbiology Recent Labs 03/03/20 
1505 03/03/20 
1504 CULT NO GROWTH AFTER 18 HOURS NO GROWTH AFTER 18 HOURS  
  
 
 
RADIOLOGY: 
 
All available imaging studies/reports in [facility] for this admission were reviewed Dr. Tyler Galindo, Infectious Disease Specialist 
[phone] March 4, 2020 
8:38 AM

## 2020-03-04 NOTE — PROGRESS NOTES
Inpatient Daily Progress Note Subjective:  
Stephen Sinha  Pain is well controlled. has not had nausea has not vomiting has not passed flatus has not had a bowel movement Ban Santos She is sedated and doesn't talk . Objective:  
 
 
Physical Exam: 
Visit Vitals /66 Pulse 74 Temp 98.2 °F (36.8 °C) Resp 16 Ht 5' 4\" (1.626 m) Wt 88.1 kg (194 lb 3.6 oz) LMP 11/25/2012 (Exact Date) SpO2 98% BMI 33.34 kg/m² Gen: Well developed, well nourished 62 y.o. female in no acute distress Resp: clear to auscultation bilaterally, no wheezes rhonchi or rales, excursions normal and symmetrical 
Cardio: Regular rate and rhythm, no murmurs, clicks, gallops or rubs, no edema Abdomen: soft, nontender, nondistended, normoactive bowel sounds, no hernias Psych: alert and oriented to person, place and time Recent Results (from the past 12 hour(s)) GLUCOSE, POC Collection Time: 03/03/20 11:57 PM  
Result Value Ref Range Glucose (POC) 132 (H) 70 - 110 mg/dL POC G3 Collection Time: 03/04/20  4:07 AM  
Result Value Ref Range Device: VENT    
 FIO2 (POC) 55 % pH (POC) 7.426 7.35 - 7.45    
 pCO2 (POC) 46.8 (H) 35.0 - 45.0 MMHG  
 pO2 (POC) 111 (H) 80 - 100 MMHG  
 HCO3 (POC) 30.8 (H) 22 - 26 MMOL/L  
 sO2 (POC) 98 (H) 92 - 97 % Base excess (POC) 6 mmol/L Mode ASSIST CONTROL Tidal volume 450 ml Set Rate 16 bpm  
 PEEP/CPAP (POC) 8 cmH2O Allens test (POC) N/A Total resp. rate 16 Site DRAWN FROM ARTERIAL LINE Specimen type (POC) ARTERIAL Performed by Canda High Volume control plus YES    
MAGNESIUM Collection Time: 03/04/20  4:10 AM  
Result Value Ref Range Magnesium 2.0 1.6 - 2.6 mg/dL PHOSPHORUS Collection Time: 03/04/20  4:10 AM  
Result Value Ref Range Phosphorus 3.4 2.5 - 4.9 MG/DL  
CBC WITH AUTOMATED DIFF Collection Time: 03/04/20  4:10 AM  
Result Value Ref Range WBC 23.9 (H) 4.6 - 13.2 K/uL RBC 3.34 (L) 4.20 - 5.30 M/uL HGB 9.0 (L) 12.0 - 16.0 g/dL HCT 28.1 (L) 35.0 - 45.0 % MCV 84.1 74.0 - 97.0 FL  
 MCH 26.9 24.0 - 34.0 PG  
 MCHC 32.0 31.0 - 37.0 g/dL  
 RDW 16.1 (H) 11.6 - 14.5 % PLATELET 98 (L) 526 - 420 K/uL MPV 10.9 9.2 - 11.8 FL  
 NEUTROPHILS 89 (H) 42 - 75 % BAND NEUTROPHILS 3 0 - 5 % LYMPHOCYTES 5 (L) 20 - 51 % MONOCYTES 3 2 - 9 % EOSINOPHILS 0 0 - 5 % BASOPHILS 0 0 - 3 % NRBC 1.0 (H) 0  WBC  
 ABS. NEUTROPHILS 22.0 (H) 1.8 - 8.0 K/UL  
 ABS. LYMPHOCYTES 1.2 0.8 - 3.5 K/UL  
 ABS. MONOCYTES 0.7 0 - 1.0 K/UL  
 ABS. EOSINOPHILS 0.0 0.0 - 0.4 K/UL  
 ABS. BASOPHILS 0.0 0.0 - 0.06 K/UL  
 DF AUTOMATED PLATELET COMMENTS Platelet Estimate, Decreased RBC COMMENTS ANISOCYTOSIS 
1+ METABOLIC PANEL, COMPREHENSIVE Collection Time: 03/04/20  4:10 AM  
Result Value Ref Range Sodium 149 (H) 136 - 145 mmol/L Potassium 2.5 (LL) 3.5 - 5.5 mmol/L Chloride 110 100 - 111 mmol/L  
 CO2 26 21 - 32 mmol/L Anion gap 13 3.0 - 18 mmol/L Glucose 244 (H) 74 - 99 mg/dL BUN 19 (H) 7.0 - 18 MG/DL Creatinine 0.84 0.6 - 1.3 MG/DL  
 BUN/Creatinine ratio 23 (H) 12 - 20 GFR est AA >60 >60 ml/min/1.73m2 GFR est non-AA >60 >60 ml/min/1.73m2 Calcium 7.7 (L) 8.5 - 10.1 MG/DL Bilirubin, total 0.8 0.2 - 1.0 MG/DL  
 ALT (SGPT) 14 13 - 56 U/L  
 AST (SGOT) 22 10 - 38 U/L Alk. phosphatase 103 45 - 117 U/L Protein, total 5.6 (L) 6.4 - 8.2 g/dL Albumin 1.7 (L) 3.4 - 5.0 g/dL Globulin 3.9 2.0 - 4.0 g/dL A-G Ratio 0.4 (L) 0.8 - 1.7 GLUCOSE, POC Collection Time: 03/04/20  5:49 AM  
Result Value Ref Range Glucose (POC) 167 (H) 70 - 110 mg/dL Assessment:  
 
Beatriz Hdz is a 62 y.o. female with colostomy, now intubated. Physical Exam:   
 
General:  in no apparent distress Eyes:  Closed and sedated Throat & Neck: no erythema or exudates noted and neck supple and symmetrical; no palpable masses Lungs:   clear to auscultation bilaterally Heart:  Regular rate and rhythm Abdomen:   Wound ok. Dressing twice a day. Ostomy viable but no output. NG output appears low. Will start TF but check residual in stomach Extremities: extremities normal, atraumatic, no cyanosis or edema Skin: Normal.  
   
Plan:  
 
-Continue current medications Start tube feed Wet dressing.   
 
 
Harini Eldridge MD

## 2020-03-04 NOTE — DIABETES MGMT
GLYCEMIC CONTROL PLAN OF CARE Assessment/Recommendations: 
Blood glucose this am 167 mg/dl Noted pt receiving steroids Corrective insulin coverage ordered as needed Will continue inpatient monitoring for need to advance to very insulin resistant dosing. Most recent blood glucose values: 
Results for Adrien Crabtree (MRN [id number]) as of 3/4/2020 09:41 Ref. Range 3/3/2020 06:21 3/3/2020 10:49 3/3/2020 16:46 3/3/2020 23:57 3/4/2020 05:49 GLUCOSE,FAST - POC Latest Ref Range: 70 - 110 mg/dL 199 (H) 200 (H) 193 (H) 132 (H) 167 (H) Current A1C of 8.2 % is equivalent to average blood glucose of 189_mg/dl over the past 2-3 months. Current hospital diabetes medications:  
Lispro corrective insulin coverage every 6 hours as needed Previous day's insulin requirements:  
Lispro 11 units corrective insulin Home diabetes medications:  Per pta med list 
Lantus 20 units daily before breakfast 
Humalog AC based on her blood sugar reading Diet:   
NPO Education:  ____Refer to Diabetes Education Record  
          _x__Education not indicated at this time 
intubated Thomas Suárez RN CDE Ext [phone]

## 2020-03-05 NOTE — WOUND CARE
Physical Exam 
Abdominal:  
 
 
 
Room 306: wound assess & wound vac application Vacuum Assisted Closure Anterior;Mid Abdominal (Active) Vac Status Suction, continuous; Patent;Draining 3/5/2020  8:57 AM  
Suction (mmHg) 125 3/5/2020  8:57 AM  
Site Assessment Clean, dry, & intact 3/5/2020  8:57 AM  
Dressing Status New 3/5/2020  8:57 AM  
Intake (ml) 0 ml 3/5/2020  8:57 AM  
Drainage Chamber Level (ml) 0 ml 3/5/2020  8:57 AM  
[place] Changed Yes 3/5/2020  8:57 AM  
Output (ml) 0 ml 3/5/2020  8:57 AM  
Machine Type KCI wound vac ulta machine 3/5/2020  8:57 AM  
Number of days: 0 Wound Abdomen Mid midline open surgical incision (Active) Dressing Status Intact;Breakthrough drainage;Removed; Saturated;Moist;Changed per order 3/5/2020  8:51 AM  
Dressing Type Gauze wrap (satnam);ABD pad 3/5/2020  8:51 AM  
Incision Site Well Approximated No 3/5/2020  8:51 AM  
Non-staged Wound Description Full thickness 3/5/2020  8:51 AM  
Shape linear 3/5/2020  8:51 AM  
Wound Length (cm) 15 cm 3/5/2020  8:51 AM  
Wound Width (cm) 2.5 cm 3/5/2020  8:51 AM  
Wound Depth (cm) 2.5 cm 3/5/2020  8:51 AM  
Wound Surface Area (cm^2) 37.5 cm^2 3/5/2020  8:51 AM  
Wound Volume (cm^3) 93.75 cm^3 3/5/2020  8:51 AM  
Condition of Base Adipose exposed;Pink 3/5/2020  8:51 AM  
Condition of Edges Closed 3/5/2020  8:51 AM  
Epithelialization (%) 0 3/5/2020  8:51 AM  
Assessment Drainage;[place] 3/5/2020  8:51 AM  
Tissue Type Percent Pink 20 3/5/2020  8:51 AM  
Tissue Type Percent Yellow 80 3/5/2020  8:51 AM  
Drainage Amount Large 3/5/2020  8:51 AM  
Drainage Color Serous; Serosanguinous 3/5/2020  8:51 AM  
Wound Odor None 3/5/2020  8:51 AM  
Génesis-wound Assessment Hyperpigmented; Intact 3/5/2020  8:51 AM  
Margins Attached edges; Defined edges 3/5/2020  8:51 AM  
Dressing Changed Changed/New 3/5/2020  8:51 AM  
Dressing Type Applied Negative pressure wound therapy 3/5/2020  8:51 AM  
Procedure Tolerated Well 3/5/2020  8:51 AM  
Number of days: 5 Applied wound vac to mid abdomen wound, noted 3 staples, one at top & 2 middle of wound requiring wound vac foam to be fed underneath the staples. Utilized black granufoam & placed one piece in wound under staples & one piece for button technique. Standard settings initiated. Discussed with primary nurse Hemanth John RN. Will turn over care to nursing staff at this time.  
Tashia WARRENN, RN, [facility], [address]

## 2020-03-05 NOTE — PROGRESS NOTES
NUTRITION Nutrition Screen RECOMMENDATIONS / PLAN:  
 
- Advance tube feeding of Vital AF 1.2 as tolerated by 10 mL q 12 hours to 30 mL/hr with Prosource TID, daily multivitamin and 100 mL q 4 hour water flushes; increase to goal rate of 40 mL/hr once propofol is stopped. - Continue RD inpatient monitoring and evaluation. Goal Enteral Regimen: Vital AF at 40 mL/hr + Prosource TID + 100 mL q 4 hour water flushes to provide: 1332 kcal, 117 gm protein, 52 gm fat, 106 gm CHO, 5 gm fiber, 1779 mL free water, 1379 mL total free water, <100% RDIs NUTRITION INTERVENTIONS & DIAGNOSIS:  
 
- Enteral nutrition: advance, verbal order from MD 
- Collaboration and referral of nutrition care: interdisciplinary rounds Nutrition Diagnosis: Inadequate oral intake related to respiratory status, altered GI function as evidenced by pt NPO. ASSESSMENT:  
 
3/5: Tolerating trickle feeds, hypernatremia noted. 3/4: Minimal out from colostomy with 1100 mL out from NGT- will start trial of trickle feeding and monitor. 3/3: Remains intubated, no nausea/vomiting, no ostomy function. No output documented from NGT.  
3/2: S/p ex lap, descending colectomy and creation of colostomy on 3/1 then re-ex lap, drainage of intra-abdominal stool collection, descending colectomy with mobilization of the splenic flexure and transverse colostomy. Remains intubated on the vent postop. Nutritional intake adequate to meet patients estimated nutritional needs:  No  
 
Tube Feeding: Vital AF 1.2 at 10 mL/hr via NGT Water Flushes: 30 mL q 4 hours Residuals: 60 mL Diet: DIET TUBE FEEDING Please check residuals every hour for first 4 hours after starting feeds- please hold for residual over 200 mL and contact MD. If residuals less than 200 mL after 4 hours, decrease to checking residuals every 4 hours. Food Allergies: NKFA Current Appetite: Not Applicable - NPO 
 Appetite/meal intake prior to admission: Unable to determine at this time Feeding Limitations:  [] Swallowing difficulty    [] Chewing difficulty    [x] Other: respiratory status Current Meal Intake: No data found. BM: PTA; colostomy (20 mL serosanguinous output) Skin Integrity: abdominal surgical incision; MOHINDER drain Edema:   [] No     [x] Yes Pertinent Medications: Reviewed: steroid, SSI, pantoprazole, propofol at 30 mcg/kg/min (407 kcal per day), 80 mEq KCl, levophed- off Recent Labs 03/05/20 
2143 03/05/20 
0030 03/04/20 
1240 03/04/20 
0410 * 150* 146* 149*  
K 3.3* 2.9* 3.6 2.5*  
* 111 110 110 CO2 31 31 28 26 * 225* 234* 244* BUN 25* 22* 20* 19* CREA 1.04 0.94 0.86 0.84 CA 7.9* 7.7* 7.8* 7.7* MG 2.0  --  1.9 2.0 PHOS 2.1*  --  3.5 3.4 ALB 1.5* 1.6* 1.5* 1.7* SGOT 20 21 37 22 ALT 14 14 15 14 Intake/Output Summary (Last 24 hours) at 3/5/2020 4190 Last data filed at 3/5/2020 4868 Gross per 24 hour Intake 1305.67 ml Output 2645 ml Net -1339.33 ml Anthropometrics: 
Ht Readings from Last 1 Encounters:  
03/03/20 5' 4\" (1.626 m) Last 3 Recorded Weights in this Encounter 03/03/20 0700 03/03/20 1457 03/04/20 0530 Weight: 87.2 kg (192 lb 3.9 oz) 87.1 kg (192 lb) 88.1 kg (194 lb 3.6 oz) Body mass index is 33.34 kg/m². Obese, Class I Weight History: previously reported intentional weight loss and previous usual weight of 230 lb Weight Metrics 3/4/2020 2/28/2020 1/22/2020 1/17/2020 1/2/2020 12/16/2019 12/14/2019 Weight 194 lb 3.6 oz - 171 lb 11.2 oz - 172 lb 6.4 oz - 178 lb 9.6 oz BMI 33.34 kg/m2 28.57 kg/m2 - 28.57 kg/m2 - 28.69 kg/m2 - Some encounter information is confidential and restricted. Go to Review Flowsheets activity to see all data. Admitting Diagnosis: Septic shock ([facility] [address] [A41.9, R65.21] Perforated viscus [R19.8] Pertinent PMHx: COPD, HTN, DM, hepatitis C, sarcoidosis, methadone use Education Needs:        [x] None identified  [] Identified - Not appropriate at this time  []  Identified and addressed - refer to education log Learning Limitations:   [] None identified  [x] Identified: altered mentation Cultural, Moravian & ethnic food preferences:  [x] None identified    [] Identified and addressed ESTIMATED NUTRITION NEEDS:  
 
Calories: 946-1204 kcal (11-14 kcal/kg) based on  [x] Actual BW 86 kg     [] IBW Protein: 110-138 gm (2-2.5 gm/kg) based on  [] Actual BW      [x] IBW 55 kg Fluid: 1 mL/kcal 
  
MONITORING & EVALUATION:  
 
Nutrition Goal(s):  
- Nutritional needs will be met through adequate oral intake or nutrition support within the next 7 days. Outcome: Progressing towards goal  
 
Monitoring:   [x] Food and nutrient intake   [x] Food and nutrient administration  [x] Comparative standards   [x] Nutrition-focused physical findings   [x] Anthropometric Measurements   [x] Treatment/therapy   [x] Biochemical data, medical tests, and procedures Previous Recommendations (for follow-up assessments only): Implemented Discharge Planning: Nutritional discharge needs unknown at this time. Participated in care planning, discharge planning, & interdisciplinary rounds as appropriate. Blaise Gowers, RD, [facility] Pager: [phone]

## 2020-03-05 NOTE — PROGRESS NOTES
Infectious Disease progress Note Reason: sepsis, gram negative bacteremia,  
 
Current abx Prior abx Pip/tazo since 3/1 Cefepime, metronidazole 2/29-3/1 Vancomycin 2/29-3/2/20 Lines:  
 
 
Assessment : 
 
62 y.o. female with PMH of COPD (on 3LPM NC home O2), Sarcoidosis, HTN, T2DM (last hgbA1C 8.2 on 2/29),  Hep C, recent Spine Thoracic Laminectomy T6-T11 with Fusion (12/11/20) 2/2 compression fx of T9-T10 and spinal cord compression at T9 and T10 and spinal cord edema with subsequent paraparesis  who presented to [facility] ED on 02/29/20 c/o acute abdominal pain, SOB. Clinical presentation c/w septic shock (POA) due to gram negative bloodstream infection (positive blood culture 2/29), sigmoid perforation, secondary peritonitis s/p emergent Ex lap on 2/29/20. Intra operatively found to have sigmoid colon perforation, 2/2 constipation with large amount of stool burden spilling into the peritoneum per Dr. Ebonie Sosa. S/p Re-exploratory laparotomy, drainage of intra-abdominal stool collection, descending colectomy with mobilization of the splenic flexure, and transverse colostomy on 3/1. Significant pyuria on UA 2/29. Urine culture >100,000 e.coli suggestive of probable cystitis. Acute on chronic respiratory failure - likely due to sepsis. Sputum culture 3/1- moraxella (beta lactamase positive). Diffuse pulmonary opacities concerning for aspiration pneumonia. Clinically better. Off pressors. Single positive blood culture for staph species on 3/1- d/w micro lab- likely coagulase negative staph - likely contaminant. abx management complicated due to h/o pcn allergy. Currently tolerating pip/tazo without difficulties. Recommendations: 1. continue pip/tazo 2. cont fluconazole 3. F/u serum beta d glucan 4. F/u id of gnr in blood culture 2/29, f.u id of staph in blood culture 3/3 5. Monitor for intra abdominal abscess. 6. Remove rousseau 7. Remove right IJ CVC if patient remains off pressors Above plan was discussed in details with  RN. Please call me if any further questions or concerns. Will continue to participate in the care of this patient. HPI: 
 detailed ros not feasible since patient intubated. home Medication List  
 Details  
roflumilast (DALIRESP) 250 mcg tab Take 250 mcg by mouth daily. Qty: 30 Tab, Refills: 0  
  
ARIPiprazole (ABILIFY) 10 mg tablet Take 1 Tab by mouth. aspirin-calcium carbonate 81 mg-300 mg calcium(777 mg) tab Take 1 Tab by mouth. divalproex DR (DEPAKOTE) 250 mg tablet Take 1 Tab by mouth. doxycycline (MONODOX) 100 mg capsule   
  
gabapentin (NEURONTIN) 300 mg capsule Take 1 Cap by mouth. ipratropium (ATROVENT) 0.02 % soln   
  
methylPREDNISolone (MEDROL DOSEPACK) 4 mg tablet   
  
oxyCODONE-acetaminophen (PERCOCET) 5-325 mg per tablet Take 1 Tab by mouth. tolterodine (DETROL) 1 mg tablet Take 1 Tab by mouth. traMADol (ULTRAM) 50 mg tablet Take 1 Tab by mouth. traZODone (DESYREL) 100 mg tablet Take 1 Tab by mouth. !! busPIRone (BUSPAR) 15 mg tablet Take 15 mg by mouth two (2) times a day. Indications: repeated episodes of anxiety Qty: 60 Tab, Refills: 2 OXYGEN-AIR DELIVERY SYSTEMS 3 L/min by Nasal route continuous. [employer] O2  
  
albuterol-ipratropium (DUO-NEB) 2.5 mg-0.5 mg/3 ml nebu 3 mL by Nebulization route every six (6) hours as needed for Wheezing. Qty: 30 Nebule, Refills: 1  
  
!! busPIRone (BUSPAR) 10 mg tablet Take 10 mg by mouth three (3) times daily. Indications: repeated episodes of anxiety  
  
cholecalciferol (VITAMIN D3) (1000 Units /25 mcg) tablet Take 1,000 Units by mouth two (2) times a day. levothyroxine (SYNTHROID) 100 mcg tablet Take 100 mcg by mouth Daily (before breakfast). insulin glargine (LANTUS U-100 INSULIN) 100 unit/mL injection 20 Units by SubCUTAneous route Daily (before breakfast). famotidine (PEPCID) 20 mg tablet Take 20 mg by mouth nightly. aspirin 81 mg chewable tablet Take 1 Tab by mouth daily (with breakfast). Indications: stroke prevention 
Qty: 30 Tab, Refills: 0  
  
docusate sodium (COLACE) 100 mg capsule Take 1 Cap by mouth daily (after breakfast). Indications: constipation 
Qty: 30 Cap, Refills: 0  
  
predniSONE (DELTASONE) 20 mg tablet Take 20 mg by mouth daily (with breakfast). Indications: sarcoidosis Qty: 30 Tab, Refills: 0 Associated Diagnoses: Sarcoidosis  
  
ascorbic acid, vitamin C, (VITAMIN C) 250 mg tablet Take 1 Tab by mouth daily (with breakfast). Qty: 30 Tab, Refills: 0 Associated Diagnoses: Acute blood loss as cause of postoperative anemia  
  
calcium citrate 200 mg (950 mg) tablet Take 1 Tab by mouth two (2) times a day. Indications: post-menopausal osteoporosis prevention 
Qty: 60 Tab, Refills: 0 Associated Diagnoses: Status post laminectomy with spinal fusion  
  
ferrous sulfate 325 mg (65 mg iron) tablet Take 1 Tab by mouth daily (with breakfast). Qty: 30 Tab, Refills: 0 Associated Diagnoses: Acute blood loss as cause of postoperative anemia  
  
acetaminophen (TYLENOL) 325 mg tablet Take 2 Tabs by mouth every four (4) hours as needed for Pain. Indications: pain 
Qty: 60 Tab, Refills: 0 Associated Diagnoses: Status post laminectomy with spinal fusion  
  
brief disposable (ADULT) misc by Does Not Apply route. Qty: 1 Package, Refills: 0 Associated Diagnoses: Urge urinary incontinence  
  
methadone (DOLOPHINE) 5 mg tablet Take 4 Tabs by mouth daily. Max Daily Amount: 20 mg. 
Qty: 10 Tab, Refills: 0 Associated Diagnoses: History of back injury  
  
polyethylene glycol (MIRALAX) 17 gram/dose powder Take 17 g by mouth daily. 1 tablespoon with 8 oz of water daily 
Qty: 289 g, Refills: 0  
  
umeclidinium-vilanterol (ANORO ELLIPTA) 62.5-25 mcg/actuation inhaler Take 1 Puff by inhalation daily. Qty: 1 Inhaler, Refills: 0 BD INSULIN SYRINGE ULTRA-FINE 1 mL 31 gauge x 5/16 syrg   
  
rosuvastatin (CRESTOR) 5 mg tablet TAKE ONE TABLET NIGHTLY Qty: 90 Tab, Refills: 3  
  
albuterol (PROAIR HFA) 90 mcg/actuation inhaler INHALE 2 PUFFS EVERY 4 HOURS AS NEEDED FOR WHEEZING Qty: 1 Inhaler, Refills: 5  
  
oxybutynin (DITROPAN) 5 mg tablet Take 5 mg by mouth two (2) times a day. allopurinol (ZYLOPRIM) 100 mg tablet Take 100 mg by mouth daily. insulin lispro (HUMALOG) 100 unit/mL injection To be given 15 min before meals: < 150 - None, 151-200 - 2 u, 201-250 - 4 u, 251-300 - 6 u, 301-350 - 8 u, 351-400 - 10 u, >400 - 12 u Qty: 1 Vial, Refills: 0 Associated Diagnoses: Type 2 diabetes mellitus with stage 3 chronic kidney disease, with long-term current use of insulin (Formerly [facility])  
  
insulin syringe,safetyneedle 1 mL 30 gauge x 5/16\" syrg As directed Qty: 50 Each, Refills: 0 Nebulizer Accessories Kit Use with nebulizer machine Qty: 1 Kit, Refills: prn  
 Associated Diagnoses: COPD (chronic obstructive pulmonary disease) (Former[facility])  
  
sertraline (ZOLOFT) 50 mg tablet Take 50 mg by mouth daily. Indications: Bipolar Depression Current Facility-Administered Medications Medication Dose Route Frequency  fluconazole (DIFLUCAN) 400mg/200 mL IVPB (premix)  400 mg IntraVENous Q24H  piperacillin-tazobactam (ZOSYN) 4.5 g in 0.9% sodium chloride (MBP/ADV) 100 mL MBP  4.5 g IntraVENous Q6H  
 acetaminophen (TYLENOL) solution 650 mg  650 mg Per NG tube Q4H PRN  
 heparin (porcine) injection 5,000 Units  5,000 Units SubCUTAneous Q8H  propofol (DIPRIVAN) 10 mg/mL infusion  0-50 mcg/kg/min IntraVENous TITRATE  amiodarone (NEXTERONE) 360 mg in dextrose 200 mL (1.8 mg/mL) infusion  0.5-1 mg/min IntraVENous TITRATE  diphenhydrAMINE (BENADRYL) injection 25 mg  25 mg IntraVENous Q4H PRN  
 sodium chloride (NS) flush 5-10 mL  5-10 mL IntraVENous PRN  chlorhexidine (PERIDEX) 0.12 % mouthwash 10 mL  10 mL Oral Q12H  sodium chloride (NS) flush 5-40 mL  5-40 mL IntraVENous PRN  pantoprazole (PROTONIX) 40 mg in 0.9% sodium chloride 10 mL injection  40 mg IntraVENous Q12H  
 fentaNYL (PF) 900 mcg/30 ml infusion soln  0-200 mcg/hr IntraVENous TITRATE  midazolam (VERSED) injection 1-2 mg  1-2 mg IntraVENous Q30MIN PRN  
 fentaNYL citrate (PF) injection  mcg   mcg IntraVENous Q30MIN PRN  
 hydrocortisone Sod Succ (PF) (SOLU-CORTEF) injection 50 mg  50 mg IntraVENous Q6H  
 albuterol-ipratropium (DUO-NEB) 2.5 MG-0.5 MG/3 ML  3 mL Nebulization Q4H RT  
 albuterol (ACCUNEB) nebulizer solution 1.25 mg  1.25 mg Nebulization Q6H PRN  
 budesonide (PULMICORT) 500 mcg/2 ml nebulizer suspension  500 mcg Nebulization BID RT  
 insulin lispro (HUMALOG) injection   SubCUTAneous Q6H  
 glucose chewable tablet 16 g  4 Tab Oral PRN  
 glucagon (GLUCAGEN) injection 1 mg  1 mg IntraMUSCular PRN  
 dextrose (D50W) injection syrg 12.5-25 g  25-50 mL IntraVENous PRN  
 NOREPINephrine (LEVOPHED) 32 mg in 5% dextrose 250 mL infusion  0.5-30 mcg/min IntraVENous TITRATE Allergies: Moxifloxacin; Pcn [penicillins]; and Sulfa (sulfonamide antibiotics) Temp (24hrs), Av.3 °F (37.4 °C), Min:97.8 °F (36.6 °C), Max:100.7 °F (38.2 °C) Visit Vitals BP 96/53 Pulse 94 Temp 100.2 °F (37.9 °C) Resp 16 Ht 5' 4\" (1.626 m) Wt 88.1 kg (194 lb 3.6 oz) LMP 2012 (Exact Date) SpO2 96% BMI 33.34 kg/m² ROS: unable to obtain Physical Exam: 
 
General:  Intubated/Sedated, NAD Head:  Normocephalic, without obvious abnormality, atraumatic. Eyes:  Conjunctivae/corneas clear. PERRL, Nose: Nares normal. Septum midline. Mucosa normal. No drainage or sinus tenderness. Throat: Lips, mucosa, and tongue normal. Teeth and gums normal.  
Neck: Supple, symmetrical, trachea midline, no adenopathy, no carotid bruit and no JVD.   
Lungs:   Symmetrical chest rise; good AE bilat; course throughout; no wheezes/rales noted. Heart:  RRR, S1, S2 normal, no m/r/g Abdomen:   Soft, appropriately tender. Distended. Non-rigid. +midline surgical incision, dressing c/d/i. MOHINDER drain to wall suction with mild sanguinous output. Absent bowel sounds. No masses,  No organomegaly. Extremities: Extremities normal, atraumatic, no cyanosis or edema. Pulses: 2+ and symmetric all extremities. Skin: Skin color, texture, turgor normal. No rashes or lesions Neurologic: Sedated Labs: Results:  
Chemistry Recent Labs 03/05/20 
0030 03/04/20 
1240 03/04/20 
0410 * 234* 244* * 146* 149*  
K 2.9* 3.6 2.5*  
 110 110 CO2 31 28 26 BUN 22* 20* 19* CREA 0.94 0.86 0.84 CA 7.7* 7.8* 7.7* AGAP 8 8 13 BUCR 23* 23* 23* AP 75 91 103  
TP 4.8* 5.5* 5.6* ALB 1.6* 1.5* 1.7*  
GLOB 3.2 4.0 3.9 AGRAT 0.5* 0.4* 0.4* CBC w/Diff Recent Labs 03/05/20 
0500 03/04/20 
0410 03/03/20 
0440 WBC 13.4* 23.9* 26.1*  
RBC 3.04* 3.34* 3.39* HGB 8.4* 9.0* 9.5* HCT 25.5* 28.1* 28.6* PLT 77* 98* 168 GRANS PENDING 89* 70  
LYMPH PENDING 5* 3*  
EOS PENDING 0 0 Microbiology Recent Labs 03/03/20 
1505 03/03/20 
1504 CULT AEROBIC BOTTLE STAPHYLOCOCCUS SPECIES* NO GROWTH 2 DAYS  
  
 
 
RADIOLOGY: 
 
All available imaging studies/reports in [facility] care for this admission were reviewed Dr. Adri Mena, Infectious Disease Specialist 
[phone] March 5, 2020 
8:38 AM

## 2020-03-05 NOTE — CONSULTS
[facility] - Consult Note Date of  Admission: 2/29/2020  8:59 AM 
  
Primary Care Physician:  Karla Gallardo NP Plan: 1. Paroxymal Atrial flutter/atrial fibrillation  with RVR- has had intermittent runs of afib/flutter early this am. currently NSR - on amiodarone drip. Not an ideal candidate for anticoagulation due to recent abdominal surgery and thrombocytopenia- will discuss with general surgery is ok to start asa. Recent  echo showed preserved EF% . Follow TSH, T4 
2. Acute Sigmoid Colon Perforation - s/p Emergent Ex-Lap with drainage of intraabdominal stool and collections; sigmoid colectomy with Dr. Viviana Johnson on 02/29/20.sigmoid perforation, secondary peritonitis  S/p Re-exploratory laparotomy, drainage of intra-abdominal stool collection, descending colectomy with mobilization of the splenic flexure, and transverse colostomy on 3/1. 
3. Septic Shock- requiring vasopressor support- better 4. Hypotension- in the setting of #3 - resolved off vasopressor support 5. Acute on chronic diastolic heart failure-appears  compensated 6. Hx sarcoidosis 7. Hx of abnormal Dobutamine  stress test- 11/2016 patient was on Plavix at home 8. Hx of Tobacco abuse counseling 9. Pulmonary hypertension with PAP 80 mmHg on recent echo Patient seen for PAF with intermittent bradycardia-- currently on amiodarone and cardizem. Rhythm well controlled on current meds Hold anticoagulation due to thrombocytopenia. If needed, will decrease cardizem dose. 11/2016 2. Dobutamine stress test was done, reported EF of 44% with a small 
reversible inferior wall defect.-medically reated 02/29/20 ECHO ADULT FOLLOW-UP OR LIMITED 03/03/2020 3/3/2020 Narrative · Dilated right ventricle. Reduced systolic function. Abnormal right  
ventricular septal motion. Systolic flattening of interventricular septum  
consistent with right ventricle pressure overload. · Moderate tricuspid valve regurgitation is present. · Severely elevated central venous pressure (15+ mmHg); IVC diameter is  
larger than 21 mm and collapses less than 50% with respiration. · Severe pulmonary hypertension. · Pulmonary arterial systolic pressure is 99.7 mmHg · Normal cavity size, wall thickness and systolic function (ejection  
fraction normal). Estimated left ventricular ejection fraction is 55 -  
60%. · Dilated right atrium. Signed by: Christian Bravo MD  
 
 
  
  
 
XR Results (most recent): 
[facility] [place] [facility] Encounter encounter on 02/29/20 XR CHEST PORT Narrative PORTABLE CHEST RADIOGRAPH  
 
CPT CODE: 56596 INDICATION: Intubated. COMPARISON: 3/4/2020. FINDINGS: 
 
Frontal view of the chest obtained at 0458 hours. Incompletely assessed thoracic 
fusion. Tip of ET tube is not able to be visualized with overlying fusion 
hardware obscuring visualization. Right IJ central line, tip is also obscured by 
the hardware. Cardiomediastinal silhouette is unchanged. Lung base 
consolidations are not significantly changed, though possibly increased on the 
left. Perihilar haziness is unchanged. No pneumothorax. Impression IMPRESSION: 
 
1. Unable to visualize the tip of the ETT and right IJ central line secondary 
to overlying thoracic fusion hardware. 2.  No pneumothorax. 3.  Similar lung base consolidations, though possibly increased on the left. Suspect there are underlying pleural effusions. Consolidations may reflect 
atelectasis, pneumonia, and/or pulmonary edema. 4.  At least interstitial and possible edema. Assessment:  
 
Hospital Problems  Date Reviewed: 2/28/2020 Codes Class Noted POA Perforated viscus ICD-10-CM: R19.8 ICD-9-CM: 799.89  2/29/2020 Unknown Septic shock (HCC) ICD-10-CM: A41.9, R65.21 ICD-9-CM: 038.9, 785.52, 995.92  2/29/2020 Unknown History of Present Illness: This is a 62 y.o. female admitted for Septic shock ([address] [A41.9, R65.21]Perforated viscus [R19.8]. Patient with pmHx of hypertension, CHF, sarcoidosis, copD, Tobacco abuse. admited in [facility] ED on 02/29/20 c/o acute abdominal pain, SOB with associated constipation x4-5 days  Pt was initially placed on BiPAP in ED for respiratory distress. CT Chest/Abd/Pelvis showed intraperitoneal free air consistent with perforated viscus. ED w/o otherwise significant for lactic acidosis. pt was ultimately intubated in ED for worsening overall status and respiratory failure. Sepsis alert triggered and protocol initiated. . General Surgery was consulted and pt was taken to OR for emergent Ex lap. Patient underwent  s/p ex lap where patient was found to have sigmoid colon perforation, 2/2 constipation with large amount of stool burden spilling into the peritoneum per Dr. Geovani Hernandez. Patient was transfer to the ICU in critical condition requiring ventilatory and vasopressor support. Yesterday patient was noted to have frequent episodes of possible Atrial flutter and SVT with 's to 200's . She is intubated and sedated and in critical condition and unable to provide detail history. Past Medical History:  
 
Past Medical History:  
Diagnosis Date  Abnormally low high density lipoprotein (HDL) cholesterol with hypertriglyceridemia Lipid profile (11/6/2016) showed , , HDL 38, LDL 37  Acute blood loss as cause of postoperative anemia 12/12/2019  Anxiety  Bipolar affective disorder ([address] 12/5/2012  Cellulitis of right forearm 05/04/2017  Chronic back pain  Chronic obstructive pulmonary disease (COPD) ([address] SOB, on [place] O2 Recent admission with psychosis  Chronic respiratory failure with hypoxia ([address] On home oxygen inhalation at 3 LPM via NC  
 Contusion of left elbow 5/4/2017  Depression  Diabetic neuropathy associated with type 2 diabetes mellitus ([address]  Diastolic dysfunction without heart failure Stable on diuretics  Falls frequently  Gastroesophageal reflux disease with hiatal hernia  Generalized osteoarthritis of multiple sites  Gout On Allopurinol  History of acute renal failure 5/31/2013  History of back injury   
 jumped out of second story window  History of fracture due to fall 12/11/2019  
 History of hepatitis C   
 treated  History of penicillin allergy  History of vitamin D deficiency 5/10/2017  Hypertensive heart disease without heart failure Better controlled  Hyperuricemia 5/26/2017  Hypothyroidism  Hypoxemia requiring supplemental oxygen 12/29/2014  Intravenous drug user 5/2/2017  Long term current use of aspirin  Memory difficulty  Menopause  Mixed connective tissue disease ([facility] 5/10/2017  Obesity, Class I, BMI 30-34.9  Olecranon bursitis of right elbow 5/4/2017  Opioid dependence ([facility] On Methadone  Recurrent genital herpes 5/31/2013  Right Achilles tendinitis 5/2/2017  Sarcoidosis  Sickle cell trait ([facility]  Tobacco use disorder  Type 2 diabetes mellitus with diabetic neuropathy, with long-term current use of insulin ([facility] HbA1c (12/11/2019) = 7.1  Urge urinary incontinence 5/10/2017  Venous insufficiency  Wears glasses Social History:  
 
Social History Socioeconomic History  Marital status: SINGLE Spouse name: Not on file  Number of children: Not on file  Years of education: Not on file  Highest education level: Not on file Occupational History  Occupation: Not employed Employer: NOT EMPLOYED Tobacco Use  Smoking status: Current Every Day Smoker Packs/day: 1.00 Years: 30.00 Pack years: 30.00 Types: Cigarettes Start date: 12/2/1969  Smokeless tobacco: Never Used Substance and Sexual Activity  Alcohol use: No  
 Drug use:  No  
 Types: Cocaine, Heroin Comment: +Cocaine Screen 2013 Social History Narrative Lives with 15year old son. Family History:  
 
Family History Problem Relation Age of Onset  Seizures Other  Diabetes Mother  Hypertension Mother  Heart Disease Mother  Cancer Mother  Diabetes Father  Stroke Father  Heart Disease Father  Headache Sister  Depression Neg Hx  Suicide Neg Hx  Psychotic Disorder Neg Hx  Substance Abuse Neg Hx  Dementia Neg Hx Medications: Allergies Allergen Reactions  Moxifloxacin Rash  Pcn [Penicillins] Swelling  Sulfa (Sulfonamide Antibiotics) Swelling Current Facility-Administered Medications Medication Dose Route Frequency  hydrocortisone Sod Succ (PF) (SOLU-CORTEF) injection 50 mg  50 mg IntraVENous Q12H  
 insulin glargine (LANTUS) injection 10 Units  10 Units SubCUTAneous DAILY  multivit-folic acid-herbal 092 (WELLESSE PLUS) oral liquid 30 mL  30 mL Per NG tube DAILY  potassium chloride 10 mEq, lidocaine (PF) (XYLOCAINE) 10 mg/mL (1 %) 1 mL in 0.9% sodium chloride 100 mL IVPB   IntraVENous Q1H  
 fluconazole (DIFLUCAN) 400mg/200 mL IVPB (premix)  400 mg IntraVENous Q24H  piperacillin-tazobactam (ZOSYN) 4.5 g in 0.9% sodium chloride (MBP/ADV) 100 mL MBP  4.5 g IntraVENous Q6H  
 acetaminophen (TYLENOL) solution 650 mg  650 mg Per NG tube Q4H PRN  propofol (DIPRIVAN) 10 mg/mL infusion  0-50 mcg/kg/min IntraVENous TITRATE  amiodarone (NEXTERONE) 360 mg in dextrose 200 mL (1.8 mg/mL) infusion  0.5-1 mg/min IntraVENous TITRATE  diphenhydrAMINE (BENADRYL) injection 25 mg  25 mg IntraVENous Q4H PRN  
 sodium chloride (NS) flush 5-10 mL  5-10 mL IntraVENous PRN  chlorhexidine (PERIDEX) 0.12 % mouthwash 10 mL  10 mL Oral Q12H  
 sodium chloride (NS) flush 5-40 mL  5-40 mL IntraVENous PRN  pantoprazole (PROTONIX) 40 mg in 0.9% sodium chloride 10 mL injection 40 mg IntraVENous Q12H  
 fentaNYL (PF) 900 mcg/30 ml infusion soln  0-200 mcg/hr IntraVENous TITRATE  midazolam (VERSED) injection 1-2 mg  1-2 mg IntraVENous Q30MIN PRN  
 fentaNYL citrate (PF) injection  mcg   mcg IntraVENous Q30MIN PRN  
 albuterol-ipratropium (DUO-NEB) 2.5 MG-0.5 MG/3 ML  3 mL Nebulization Q4H RT  
 albuterol (ACCUNEB) nebulizer solution 1.25 mg  1.25 mg Nebulization Q6H PRN  
 budesonide (PULMICORT) 500 mcg/2 ml nebulizer suspension  500 mcg Nebulization BID RT  
 insulin lispro (HUMALOG) injection   SubCUTAneous Q6H  
 glucose chewable tablet 16 g  4 Tab Oral PRN  
 glucagon (GLUCAGEN) injection 1 mg  1 mg IntraMUSCular PRN  
 dextrose (D50W) injection syrg 12.5-25 g  25-50 mL IntraVENous PRN  
 NOREPINephrine (LEVOPHED) 32 mg in 5% dextrose 250 mL infusion  0.5-30 mcg/min IntraVENous TITRATE Review Of Systems:  
 
Unable to obtain ROS Physical Exam:  
 
Visit Vitals /64 Pulse 95 Temp (!) 101.2 °F (38.4 °C) Resp 20 Ht 5' 4\" (1.626 m) Wt 88.1 kg (194 lb 3.6 oz) SpO2 97% BMI 33.34 kg/m² BP Readings from Last 3 Encounters:  
03/05/20 121/64  
02/28/20 131/83  
02/20/20 128/82 Pulse Readings from Last 3 Encounters:  
03/05/20 95  
02/28/20 98  
02/20/20 91 Wt Readings from Last 3 Encounters:  
03/04/20 88.1 kg (194 lb 3.6 oz) 01/22/20 77.9 kg (171 lb 11.2 oz) 01/02/20 78.2 kg (172 lb 6.4 oz) General:  Intubated and sedated Skin: Warm and dry, acyanotic, normal color. Head: Normocephalic, atraumatic. Eyes: Sclerae anicteric, conjunctivae without injection. Neck:  nontender, no nuchal rigidity, no masses, no stridor, no carotid bruit, no JVD Lungs:   diminished breath sounds b/l. Heart:  regular rate and rhythm, S1, S2 normal, no murmur, click, rub or gallop Abdomen:  abdomen is soft without significant tenderness, masses, organomegaly or guarding Extremities:  extremities normal, atraumatic, no cyanosis or edema. Peripheral pulses Neurological: unable to obtain due to patient's condition Psychiatric Affect: unable to obtain due to patient's condition Data Review:  
 
Recent Results (from the past 48 hour(s)) ECHO ADULT FOLLOW-UP OR LIMITED Collection Time: 03/03/20  2:57 PM  
Result Value Ref Range LVIDd 4.25 3.9 - 5.3 cm LVIDs 2.75 cm Triscuspid Valve Regurgitation Peak Gradient 65.6 mmHg  
 TR Max Velocity 404.87 cm/s PASP 80.0 mmHg Left Ventricular Fractional Shortening by 2D [id number] % CULTURE, BLOOD Collection Time: 03/03/20  3:04 PM  
Result Value Ref Range Special Requests: NO SPECIAL REQUESTS Culture result: NO GROWTH 2 DAYS    
CULTURE, BLOOD Collection Time: 03/03/20  3:05 PM  
Result Value Ref Range Special Requests: RIGHT 
FOREARM 
    
 GRAM STAIN AEROBIC BOTTLE GRAM POSITIVE COCCI IN GROUPS    
 GRAM STAIN     
  SMEAR CALLED TO AND CORRECTLY REPEATED BY: JENNY Espinoza [facility] CCU AT [place] [id number] BY KDA 3/4/20 Culture result: AEROBIC BOTTLE STAPHYLOCOCCUS SPECIES (A) GLUCOSE, POC Collection Time: 03/03/20  4:46 PM  
Result Value Ref Range Glucose (POC) 193 (H) 70 - 110 mg/dL GLUCOSE, POC Collection Time: 03/03/20 11:57 PM  
Result Value Ref Range Glucose (POC) 132 (H) 70 - 110 mg/dL POC G3 Collection Time: 03/04/20  4:07 AM  
Result Value Ref Range Device: VENT    
 FIO2 (POC) 55 % pH (POC) 7.426 7.35 - 7.45    
 pCO2 (POC) 46.8 (H) 35.0 - 45.0 MMHG  
 pO2 (POC) 111 (H) 80 - 100 MMHG  
 HCO3 (POC) 30.8 (H) 22 - 26 MMOL/L  
 sO2 (POC) 98 (H) 92 - 97 % Base excess (POC) 6 mmol/L Mode ASSIST CONTROL Tidal volume 450 ml Set Rate 16 bpm  
 PEEP/CPAP (POC) 8 cmH2O Allens test (POC) N/A Total resp. rate 16 Site DRAWN FROM ARTERIAL LINE Specimen type (POC) ARTERIAL Performed by Shaun Cutter  Volume control plus YES    
MAGNESIUM  
 Collection Time: 03/04/20  4:10 AM  
Result Value Ref Range Magnesium 2.0 1.6 - 2.6 mg/dL PHOSPHORUS Collection Time: 03/04/20  4:10 AM  
Result Value Ref Range Phosphorus 3.4 2.5 - 4.9 MG/DL  
CBC WITH AUTOMATED DIFF Collection Time: 03/04/20  4:10 AM  
Result Value Ref Range WBC 23.9 (H) 4.6 - 13.2 K/uL  
 RBC 3.34 (L) 4.20 - 5.30 M/uL HGB 9.0 (L) 12.0 - 16.0 g/dL HCT 28.1 (L) 35.0 - 45.0 % MCV 84.1 74.0 - 97.0 FL  
 MCH 26.9 24.0 - 34.0 PG  
 MCHC 32.0 31.0 - 37.0 g/dL  
 RDW 16.1 (H) 11.6 - 14.5 % PLATELET 98 (L) 594 - 420 K/uL MPV 10.9 9.2 - 11.8 FL  
 NEUTROPHILS 89 (H) 42 - 75 % BAND NEUTROPHILS 3 0 - 5 % LYMPHOCYTES 5 (L) 20 - 51 % MONOCYTES 3 2 - 9 % EOSINOPHILS 0 0 - 5 % BASOPHILS 0 0 - 3 % NRBC 1.0 (H) 0  WBC  
 ABS. NEUTROPHILS 22.0 (H) 1.8 - 8.0 K/UL  
 ABS. LYMPHOCYTES 1.2 0.8 - 3.5 K/UL  
 ABS. MONOCYTES 0.7 0 - 1.0 K/UL  
 ABS. EOSINOPHILS 0.0 0.0 - 0.4 K/UL  
 ABS. BASOPHILS 0.0 0.0 - 0.06 K/UL  
 DF AUTOMATED PLATELET COMMENTS Platelet Estimate, Decreased RBC COMMENTS ANISOCYTOSIS 
1+ METABOLIC PANEL, COMPREHENSIVE Collection Time: 03/04/20  4:10 AM  
Result Value Ref Range Sodium 149 (H) 136 - 145 mmol/L Potassium 2.5 (LL) 3.5 - 5.5 mmol/L Chloride 110 100 - 111 mmol/L  
 CO2 26 21 - 32 mmol/L Anion gap 13 3.0 - 18 mmol/L Glucose 244 (H) 74 - 99 mg/dL BUN 19 (H) 7.0 - 18 MG/DL Creatinine 0.84 0.6 - 1.3 MG/DL  
 BUN/Creatinine ratio 23 (H) 12 - 20 GFR est AA >60 >60 ml/min/1.73m2 GFR est non-AA >60 >60 ml/min/1.73m2 Calcium 7.7 (L) 8.5 - 10.1 MG/DL Bilirubin, total 0.8 0.2 - 1.0 MG/DL  
 ALT (SGPT) 14 13 - 56 U/L  
 AST (SGOT) 22 10 - 38 U/L Alk. phosphatase 103 45 - 117 U/L Protein, total 5.6 (L) 6.4 - 8.2 g/dL Albumin 1.7 (L) 3.4 - 5.0 g/dL Globulin 3.9 2.0 - 4.0 g/dL A-G Ratio 0.4 (L) 0.8 - 1.7 GLUCOSE, POC  Collection Time: 03/04/20  5:49 AM  
 Result Value Ref Range Glucose (POC) 167 (H) 70 - 110 mg/dL GLUCOSE, POC Collection Time: 03/04/20 11:13 AM  
Result Value Ref Range Glucose (POC) 279 (H) 70 - 110 mg/dL CALCIUM, IONIZED Collection Time: 03/04/20 12:40 PM  
Result Value Ref Range Ionized Calcium 1.03 (L) 1.12 - 1.75 MMOL/L  
METABOLIC PANEL, BASIC Collection Time: 03/04/20 12:40 PM  
Result Value Ref Range Sodium 146 (H) 136 - 145 mmol/L Potassium 3.6 3.5 - 5.5 mmol/L Chloride 110 100 - 111 mmol/L  
 CO2 28 21 - 32 mmol/L Anion gap 8 3.0 - 18 mmol/L Glucose 234 (H) 74 - 99 mg/dL BUN 20 (H) 7.0 - 18 MG/DL Creatinine 0.86 0.6 - 1.3 MG/DL  
 BUN/Creatinine ratio 23 (H) 12 - 20 GFR est AA >60 >60 ml/min/1.73m2 GFR est non-AA >60 >60 ml/min/1.73m2 Calcium 7.8 (L) 8.5 - 10.1 MG/DL MAGNESIUM Collection Time: 03/04/20 12:40 PM  
Result Value Ref Range Magnesium 1.9 1.6 - 2.6 mg/dL PHOSPHORUS Collection Time: 03/04/20 12:40 PM  
Result Value Ref Range Phosphorus 3.5 2.5 - 4.9 MG/DL  
HEPATIC FUNCTION PANEL Collection Time: 03/04/20 12:40 PM  
Result Value Ref Range Protein, total 5.5 (L) 6.4 - 8.2 g/dL Albumin 1.5 (L) 3.4 - 5.0 g/dL Globulin 4.0 2.0 - 4.0 g/dL A-G Ratio 0.4 (L) 0.8 - 1.7 Bilirubin, total 0.7 0.2 - 1.0 MG/DL Bilirubin, direct <0.1 0.0 - 0.2 MG/DL Alk. phosphatase 91 45 - 117 U/L  
 AST (SGOT) 37 10 - 38 U/L  
 ALT (SGPT) 15 13 - 56 U/L  
GLUCOSE, POC Collection Time: 03/04/20  4:32 PM  
Result Value Ref Range Glucose (POC) 243 (H) 70 - 110 mg/dL METABOLIC PANEL, COMPREHENSIVE Collection Time: 03/05/20 12:30 AM  
Result Value Ref Range Sodium 150 (H) 136 - 145 mmol/L Potassium 2.9 (LL) 3.5 - 5.5 mmol/L Chloride 111 100 - 111 mmol/L  
 CO2 31 21 - 32 mmol/L Anion gap 8 3.0 - 18 mmol/L Glucose 225 (H) 74 - 99 mg/dL BUN 22 (H) 7.0 - 18 MG/DL  Creatinine 0.94 0.6 - 1.3 MG/DL  
 BUN/Creatinine ratio 23 (H) 12 - 20    
 GFR est AA >60 >60 ml/min/1.73m2 GFR est non-AA >60 >60 ml/min/1.73m2 Calcium 7.7 (L) 8.5 - 10.1 MG/DL Bilirubin, total 0.5 0.2 - 1.0 MG/DL  
 ALT (SGPT) 14 13 - 56 U/L  
 AST (SGOT) 21 10 - 38 U/L Alk. phosphatase 75 45 - 117 U/L Protein, total 4.8 (L) 6.4 - 8.2 g/dL Albumin 1.6 (L) 3.4 - 5.0 g/dL Globulin 3.2 2.0 - 4.0 g/dL A-G Ratio 0.5 (L) 0.8 - 1.7 POC G3 Collection Time: 03/05/20  4:52 AM  
Result Value Ref Range Device: VENT    
 FIO2 (POC) 45 % pH (POC) 7.461 (H) 7.35 - 7.45    
 pCO2 (POC) 44.6 35.0 - 45.0 MMHG  
 pO2 (POC) 95 80 - 100 MMHG  
 HCO3 (POC) 31.8 (H) 22 - 26 MMOL/L  
 sO2 (POC) 98 (H) 92 - 97 % Base excess (POC) 8 mmol/L Mode ASSIST CONTROL Tidal volume 450 ml Set Rate 16 bpm  
 PEEP/CPAP (POC) 8 cmH2O Allens test (POC) YES Inspiratory Time 1.15 sec Total resp. rate 16 Site RIGHT RADIAL Patient temp. 98.6 Specimen type (POC) ARTERIAL Performed by Simon Prim Volume control plus YES    
CBC WITH AUTOMATED DIFF Collection Time: 03/05/20  5:00 AM  
Result Value Ref Range WBC 13.4 (H) 4.6 - 13.2 K/uL  
 RBC 3.04 (L) 4.20 - 5.30 M/uL HGB 8.4 (L) 12.0 - 16.0 g/dL HCT 25.5 (L) 35.0 - 45.0 % MCV 83.9 74.0 - 97.0 FL  
 MCH 27.6 24.0 - 34.0 PG  
 MCHC 32.9 31.0 - 37.0 g/dL  
 RDW 16.4 (H) 11.6 - 14.5 % PLATELET 77 (L) 452 - 420 K/uL MPV 11.3 9.2 - 11.8 FL  
 NEUTROPHILS 83 (H) 42 - 75 % BAND NEUTROPHILS 1 0 - 5 % LYMPHOCYTES 10 (L) 20 - 51 % MONOCYTES 6 2 - 9 % EOSINOPHILS 0 0 - 5 % BASOPHILS 0 0 - 3 %  
 ABS. NEUTROPHILS 11.3 (H) 1.8 - 8.0 K/UL  
 ABS. LYMPHOCYTES 1.3 0.8 - 3.5 K/UL  
 ABS. MONOCYTES 0.8 0 - 1.0 K/UL  
 ABS. EOSINOPHILS 0.0 0.0 - 0.4 K/UL  
 ABS. BASOPHILS 0.0 0.0 - 0.06 K/UL  
 DF MANUAL PLATELET COMMENTS DECREASED PLATELETS    
 RBC COMMENTS ANISOCYTOSIS 1+ GLUCOSE, POC Collection Time: 03/05/20  6:06 AM  
Result Value Ref Range Glucose (POC) 282 (H) 70 - 110 mg/dL EKG, 12 LEAD, SUBSEQUENT Collection Time: 03/05/20  7:25 AM  
Result Value Ref Range Ventricular Rate 101 BPM  
 Atrial Rate 101 BPM  
 P-R Interval 112 ms QRS Duration 86 ms  
 Q-T Interval 348 ms QTC Calculation (Bezet) 451 ms Calculated P Axis 61 degrees Calculated R Axis 10 degrees Calculated T Axis 91 degrees Diagnosis Sinus tachycardia Nonspecific T wave abnormality Abnormal ECG When compared with ECG of 02-MAR-2020 12:14, Sinus rhythm has replaced Atrial flutter Vent. rate has decreased BY  73 BPM 
ST no longer depressed in Anterior leads Nonspecific T wave abnormality no longer evident in Anterior leads METABOLIC PANEL, COMPREHENSIVE Collection Time: 03/05/20  8:54 AM  
Result Value Ref Range Sodium 151 (H) 136 - 145 mmol/L Potassium 3.3 (L) 3.5 - 5.5 mmol/L Chloride 112 (H) 100 - 111 mmol/L  
 CO2 31 21 - 32 mmol/L Anion gap 8 3.0 - 18 mmol/L Glucose 231 (H) 74 - 99 mg/dL BUN 25 (H) 7.0 - 18 MG/DL Creatinine 1.04 0.6 - 1.3 MG/DL  
 BUN/Creatinine ratio 24 (H) 12 - 20 GFR est AA >60 >60 ml/min/1.73m2 GFR est non-AA 54 (L) >60 ml/min/1.73m2 Calcium 7.9 (L) 8.5 - 10.1 MG/DL Bilirubin, total 0.4 0.2 - 1.0 MG/DL  
 ALT (SGPT) 14 13 - 56 U/L  
 AST (SGOT) 20 10 - 38 U/L Alk. phosphatase 72 45 - 117 U/L Protein, total 5.4 (L) 6.4 - 8.2 g/dL Albumin 1.5 (L) 3.4 - 5.0 g/dL Globulin 3.9 2.0 - 4.0 g/dL A-G Ratio 0.4 (L) 0.8 - 1.7 MAGNESIUM Collection Time: 03/05/20  8:54 AM  
Result Value Ref Range Magnesium 2.0 1.6 - 2.6 mg/dL PHOSPHORUS Collection Time: 03/05/20  8:54 AM  
Result Value Ref Range Phosphorus 2.1 (L) 2.5 - 4.9 MG/DL  
GLUCOSE, POC Collection Time: 03/05/20  9:02 AM  
Result Value Ref Range Glucose (POC) 247 (H) 70 - 110 mg/dL Intake/Output Summary (Last 24 hours) at 3/5/2020 1219 Last data filed at 3/5/2020 0508 Gross per 24 hour Intake 1305.67 ml Output 2095 ml Net -789.33 ml Cardiographics:  
 
 
EKG Results Procedure 720 Value Units Date/Time EKG, 12 LEAD, SUBSEQUENT [[id number]] Collected:  03/05/20 0725 Order Status:  Completed Updated:  03/05/20 5529 Ventricular Rate 101 BPM   
  Atrial Rate 101 BPM   
  P-R Interval 112 ms QRS Duration 86 ms   
  Q-T Interval 348 ms QTC Calculation (Bezet) 451 ms Calculated P Axis 61 degrees Calculated R Axis 10 degrees Calculated T Axis 91 degrees Diagnosis --  
  Sinus tachycardia Nonspecific T wave abnormality Abnormal ECG When compared with ECG of 02-MAR-2020 12:14, Sinus rhythm has replaced Atrial flutter Vent. rate has decreased BY  73 BPM 
ST no longer depressed in Anterior leads Nonspecific T wave abnormality no longer evident in Anterior leads EKG, 12 LEAD, SUBSEQUENT [[id number]] Collected:  03/02/20 1214 Order Status:  Completed Updated:  03/02/20 1424 Ventricular Rate 174 BPM   
  Atrial Rate 330 BPM   
  QRS Duration 66 ms   
  Q-T Interval 256 ms   
  QTC Calculation (Bezet) 435 ms Calculated R Axis 8 degrees Calculated T Axis -98 degrees Diagnosis -- Atrial flutter with variable AV block vs afib Low voltage QRS Nonspecific ST and T wave abnormality Abnormal ECG When compared with ECG of 02-MAR-2020 09:57, Atrial flutter has replaced Sinus rhythm Non-specific change in ST segment in Inferior leads ST now depressed in Anterior leads Confirmed by Bogdan Asher MD, --- ([id number]) on 3/2/2020 2:24:11 PM 
  
 EKG, 12 LEAD, SUBSEQUENT [[id number]] Collected:  03/02/20 0957 Order Status:  Completed Updated:  03/02/20 1416 Ventricular Rate 128 BPM   
  Atrial Rate 128 BPM   
  P-R Interval 126 ms   
  QRS Duration 72 ms Q-T Interval 314 ms QTC Calculation (Bezet) 458 ms Calculated P Axis 55 degrees Calculated R Axis 17 degrees Calculated T Axis 53 degrees   Diagnosis --  
 Sinus tachycardia with premature atrial complexes Nonspecific T wave abnormality Abnormal ECG When compared with ECG of 29-FEB-2020 08:57, 
Significant changes have occurred Confirmed by Cheyenne Clement MD, --- ([id number]) on 3/2/2020 2:16:00 PM 
  
 EKG, 12 LEAD, INITIAL [[id number]] Collected:  02/29/20 5589 Order Status:  Completed Updated:  02/29/20 1637 Ventricular Rate 167 BPM   
  Atrial Rate 167 BPM   
  P-R Interval 92 ms QRS Duration 66 ms   
  Q-T Interval 256 ms   
  QTC Calculation (Bezet) 427 ms Calculated P Axis 86 degrees Calculated R Axis 85 degrees Calculated T Axis 77 degrees Diagnosis --  
  Sinus tachycardia with short CA with occasional premature ventricular  
complexes Nonspecific ST abnormality Abnormal ECG When compared with ECG of 17-JAN-2020 19:09, 
premature ventricular complexes are now present Confirmed by Yanni Santos MD, Reggy Box ([id number]) on 2/29/2020 9:41:21 AM 
  
 EKG, 12 LEAD, INITIAL [[id number]] Order Status:  Canceled 02/29/20 ECHO ADULT FOLLOW-UP OR LIMITED 03/03/2020 3/3/2020 Narrative · Dilated right ventricle. Reduced systolic function. Abnormal right  
ventricular septal motion. Systolic flattening of interventricular septum  
consistent with right ventricle pressure overload. · Moderate tricuspid valve regurgitation is present. · Severely elevated central venous pressure (15+ mmHg); IVC diameter is  
larger than 21 mm and collapses less than 50% with respiration. · Severe pulmonary hypertension. · Pulmonary arterial systolic pressure is 38.8 mmHg · Normal cavity size, wall thickness and systolic function (ejection  
fraction normal). Estimated left ventricular ejection fraction is 55 -  
60%. · Dilated right atrium. Signed by: Andrew Nunez MD  
 
 
 
Signed By: Abrahan REYES Phone [phone] supervised March 5, 2020 I have independently evaluated and examined the patient. All relevant labs and testing data's are reviewed. Care plan discussed and updated after review.  
 
Tiffany Staley MD

## 2020-03-05 NOTE — DIABETES MGMT
GLYCEMIC CONTROL PLAN OF CARE Assessment/Recommendations: 
Blood glucose this am 282 mg/dl Noted pt receiving steroids Lantus 10 units added to start today. Corrective insulin coverage ordered as needed Advanced to very insulin resistant dosing. Will continue inpatient monitoring. Most recent blood glucose values: 
Results for Felix Granados (MRN [id number]) as of 3/5/2020 09:51 Ref. Range 3/4/2020 11:13 3/4/2020 16:32 3/5/2020 06:06 3/5/2020 09:02 GLUCOSE,FAST - POC Latest Ref Range: 70 - 110 mg/dL 279 (H) 243 (H) 282 (H) 247 (H) Current A1C of 8.2 % is equivalent to average blood glucose of 189_mg/dl over the past 2-3 months. Current hospital diabetes medications:  
Lispro corrective insulin coverage every 6 hours as needed Previous day's insulin requirements:  
Lispro 12 units corrective insulin Home diabetes medications:  Per pta med list 
Lantus 20 units daily before breakfast 
Humalog AC based on her blood sugar reading Diet:   
NPO. Trickle feeds at 10 cc currently. Education:  ____Refer to Diabetes Education Record  
          _x__Education not indicated at this time 
intubated Nirav Lion RN CDE Ext [id number]

## 2020-03-05 NOTE — PROGRESS NOTES
Patient seen and examined. Still in critical condition but there are some improvements. She is off pressors for the last few hours and her BP is stable. Had fever but on the low side. It resolved now. WBC is trending down. Today it is 13 from 21. Making good urine with normal creatinine. Abdomen si soft and non-distended. Wound is partially open and packed with serous drainage. Colostomy is viable and not functioning yet. Plan: 
 
Appreciate ICU and medicine teams management Wound VAC. Ostomy care. Continue tube feed and advance as tolerated Await colostomy functioning Close observation for development of intra-abdominal collection/abscess Will follow closely.

## 2020-03-05 NOTE — PROGRESS NOTES
attended the interdisciplinary rounds for Rufino Abdi, who is a 62 y.o.,female. Patients Primary Language is: [place]. According to the patients EMR Holiness Affiliation is: [place]. The reason the Patient came to the hospital is:  
Patient Active Problem List  
 Diagnosis Date Noted  Bipolar affective disorder ([address] 12/05/2012 Priority: 1 - One  Perforated viscus 02/29/2020  Septic shock ([address] 02/29/2020  Sepsis ([address] 01/17/2020  Chronic respiratory failure with hypoxia ([address]  Gout  Menopause  Long term current use of aspirin  Opioid dependence ([address]  Tobacco use disorder  Acute blood loss as cause of postoperative anemia 12/12/2019  Impaired mobility and ADLs 12/11/2019  Spinal cord compression ([address] 12/11/2019  Compression fracture of body of thoracic vertebra (HCC) 12/11/2019  Status post laminectomy with spinal fusion 12/11/2019  
 History of fracture due to fall 12/11/2019  Hyperuricemia 05/26/2017  Mixed connective tissue disease ([address] 05/10/2017  History of vitamin D deficiency 05/10/2017  Urge urinary incontinence 05/10/2017  History of penicillin allergy  Falls frequently  Gastroesophageal reflux disease with hiatal hernia  Memory difficulty  Cellulitis of arm, right 05/04/2017  Olecranon bursitis of right elbow 05/04/2017  Contusion of left elbow 05/04/2017  Cellulitis of right forearm 05/04/2017  Intravenous drug user 05/02/2017  Right Achilles tendinitis 05/02/2017  Cellulitis and abscess of hand 04/13/2017  Cellulitis and abscess of foot 04/13/2017  Abnormal nuclear stress test 11/17/2016  Hypoxemia requiring supplemental oxygen 12/29/2014  Abscess of right arm 06/03/2013  History of acute renal failure 05/31/2013  Recurrent genital herpes 05/31/2013  Hypothyroidism  Sarcoidosis  Diastolic dysfunction without heart failure  Chronic obstructive pulmonary disease (COPD) ([address]  Hypertensive heart disease without heart failure  Depression  History of back injury  Type 2 diabetes mellitus with diabetic neuropathy, with long-term current use of insulin ([address]  Anxiety  Wears glasses  History of hepatitis C   
 Sickle cell trait ([address]  Abnormally low high density lipoprotein (HDL) cholesterol with hypertriglyceridemia  Generalized osteoarthritis of multiple sites  Diabetic neuropathy associated with type 2 diabetes mellitus ([address]  Venous insufficiency  Obesity, Class I, BMI 30-34.9  Chronic back pain Plan: 
Chaplains will continue to follow and will provide pastoral care on an as needed/requested basis.  recommends bedside caregivers page  on duty if patient shows signs of acute spiritual or emotional distress. [address] [facility] Certified [employer] [facility]  
[phone]

## 2020-03-05 NOTE — PROGRESS NOTES
Bedside and Verbal shift change report given to Kevan Cruz 
 (oncoming nurse) by Ren Henry RN 
 (offgoing nurse). Report included the following information SBAR, MAR and Cardiac Rhythm . Ever Juarez

## 2020-03-06 NOTE — PROGRESS NOTES
[facility]. Pulmonary, Critical Care, and Sleep Medicine Name: Alicia Laughlin MRN: [id number] : 1955 Hospital: [address] [place] [address] Dr Date: 3/6/2020  Admission Date: 2020 Chart and notes reviewed. Data reviewed. I have evaluated all findings. [x]I have reviewed the flowsheet and previous days notes. [x]The patient is unable to give any meaningful history or review of systems because the patient is: 
[x]Intubated [x]Sedated  
[]Unresponsive [x]The patient is critically ill on     
[x]Mechanical ventilation [x]Pressors []BiPAP [] Interval HPI: Patient is a 62 y. o. female with PMH of COPD (on 3LPM NC home O2), Sarcoidosis, HTN, T2DM,  Hep C, recent Spine Thoracic Laminectomy T6-T11 with Fusion (20) 2/2 compression fx of T9-T10 and spinal cord compression at T9 and T10 and spinal cord edema with subsequent paraparesis, who presented to [facility] ED on 20 c/o acute abdominal pain, SOB with associated constipation x4-5 days PTA, likely 2/2 aforementioned spinal surgery on 19. CT Chest/Abd/Pelvis showed intraperitoneal free air consistent with perforated viscus. Required intubation, Underwent emergent ex lap , was found to have sigmoid colon perforation, 2/2 constipation w/ large stool burden spilling into the peritoneum per Dr. Debra Covarrubias. Dr. Debra Covarrubias took pt back to OR on 3/1 for abdominal washout and closure. ICU stay complicated by persistent vasopressor requirement and afibb. Subjective 20 Hospital Day: 6 Vent Day:6 
POD ex lap: 5 POD washout, closure: 4 Overnight events: none Mentation/Activity: sedated, does follow commands Respiratory/ Secretions: stable Hemodynamics: cont' levophed Urine output, bowel: adequate Diet: Trickle feeds Need for procedures: n/a 
           
ROS:Review of systems not obtained due to patient factors. Events and notes from last 24 hours reviewed.  Care plan discussed on multidisciplinary rounds. Patient Active Problem List  
Diagnosis Code  Diabetic neuropathy associated with type 2 diabetes mellitus ([facility]) E11.40  Venous insufficiency I87.2  Obesity, Class I, BMI 30-34.9 E66.9  Chronic back pain M54.9, G89.29  
 Generalized osteoarthritis of multiple sites M15.9  Bipolar affective disorder ([email or web address] F31.9  Abnormally low high density lipoprotein (HDL) cholesterol with hypertriglyceridemia E78.6, E78.1  Diastolic dysfunction without heart failure I51.89  Chronic obstructive pulmonary disease (COPD) ([email or web address] 75.) J44.9  Hypertensive heart disease without heart failure I11.9  Depression F32.9  History of back injury Z87.828  Type 2 diabetes mellitus with diabetic neuropathy, with long-term current use of insulin ([facility]) E11.40, Z79.4  Anxiety F41.9  Wears glasses Z97.3  History of hepatitis C Z86.19  
 Sickle cell trait ([facility]) D57.3  History of acute renal failure Z87.448  Hypothyroidism E03.9  Recurrent genital herpes A60.00  Sarcoidosis D86.9  Abscess of right arm L02.413  Hypoxemia requiring supplemental oxygen R09.02, Z99.81  
 Abnormal nuclear stress test R94.39  
 Cellulitis and abscess of hand L03.119, L02.519  
 Cellulitis and abscess of foot L03.119, L02.619  
 Cellulitis of arm, right L03. 113  
 Olecranon bursitis of right elbow M70.21  
 Contusion of left elbow S50. 02XA  Intravenous drug user F19.90  Right Achilles tendinitis M76.61  
 History of penicillin allergy Z88.0  Falls frequently R29.6  Gastroesophageal reflux disease with hiatal hernia K21.9, K44.9  Memory difficulty R41.3  Mixed connective tissue disease ([email or web address] 75.) M35.1  Impaired mobility and ADLs Z74.09  
 Hyperuricemia E79.0  History of vitamin D deficiency Z86.39  
 Urge urinary incontinence N39.41  Spinal cord compression ([facility]) G95.20  Compression fracture of body of thoracic vertebra ([facility]) S22.000A  Status post laminectomy with spinal fusion Z98.1  Acute blood loss as cause of postoperative anemia D62  
 History of fracture due to fall Z87.81  Chronic respiratory failure with hypoxia ([facility]) J96.11  
 Cellulitis of right forearm L03. 113  
 Gout M10.9  Menopause Z78.0  Long term current use of aspirin Z79.82  
 Opioid dependence (HCC) F11.20  Tobacco use disorder F17.200  Sepsis ([address] A41.9  Perforated viscus R19.8  Septic shock ([facility]) A41.9, R65.21 Vital Signs: 
Visit Vitals /70 Pulse 81 Temp 97.3 °F (36.3 °C) Resp 20 Ht 5' 4\" (1.626 m) Wt 88.1 kg (194 lb 3.6 oz) LMP 2012 (Exact Date) SpO2 100% BMI 33.34 kg/m² O2 Device: Endotracheal tube, Ventilator Temp (24hrs), Av.2 °F (36.8 °C), Min:93.8 °F (34.3 °C), Max:101.2 °F (38.4 °C) Intake/Output:  
Last shift:       190 -  0700 In: 1569.2 [I.V.:1044.2] Out: 1276 [Urine:656; Drains:500] Last 3 shifts:  0701 -  1900 In: 2685.4 [I.V.:2025.4] Out: 5570 [HCYEQ:5895] Intake/Output Summary (Last 24 hours) at 3/6/2020 0609 Last data filed at 3/6/2020 0500 Gross per 24 hour Intake 3158.9 ml Output 3851 ml Net -692.1 ml  
  
 
Ventilator Settings: 
Ventilator Mode: Assist control, VC+ Respiratory Rate Resp Rate Observed: 27 Back-Up Rate: 16 Insp Time (sec): 1 sec Insp Flow (l/min): 1.15 l/min I:E Ratio: 1;2 
Ventilator Volumes Vt Set (ml): 450 ml Vt Exhaled (Machine Breath) (ml): 450 ml Vt Spont (ml): 507 ml Ve Observed (l/min): 8 l/min Ventilator Pressures Pressure Support (cm H2O): 7 cm H2O 
PIP Observed (cm H2O): 20 cm H2O Plateau Pressure (cm H2O): 17 cm H2O 
MAP (cm H2O): 11 PEEP/VENT (cm H2O): 7 cm H20 Auto PEEP Observed (cm H2O): 0 cm H2O Current Facility-Administered Medications Medication Dose Route Frequency  potassium phosphate 9 mmol in 0.9% sodium chloride 250 mL infusion   IntraVENous ONCE  
  hydrocortisone Sod Succ (PF) (SOLU-CORTEF) injection 50 mg  50 mg IntraVENous Q12H  
 insulin glargine (LANTUS) injection 10 Units  10 Units SubCUTAneous DAILY  multivit-folic acid-herbal 905 (WELLESSE PLUS) oral liquid 30 mL  30 mL Per NG tube DAILY  dilTIAZem (CARDIZEM) IR tablet 30 mg  30 mg Oral TIDAC  fluconazole (DIFLUCAN) 400mg/200 mL IVPB (premix)  400 mg IntraVENous Q24H  piperacillin-tazobactam (ZOSYN) 4.5 g in 0.9% sodium chloride (MBP/ADV) 100 mL MBP  4.5 g IntraVENous Q6H  
 propofol (DIPRIVAN) 10 mg/mL infusion  0-50 mcg/kg/min IntraVENous TITRATE  amiodarone (NEXTERONE) 360 mg in dextrose 200 mL (1.8 mg/mL) infusion  0.5-1 mg/min IntraVENous TITRATE  chlorhexidine (PERIDEX) 0.12 % mouthwash 10 mL  10 mL Oral Q12H  pantoprazole (PROTONIX) 40 mg in 0.9% sodium chloride 10 mL injection  40 mg IntraVENous Q12H  
 fentaNYL (PF) 900 mcg/30 ml infusion soln  0-200 mcg/hr IntraVENous TITRATE  albuterol-ipratropium (DUO-NEB) 2.5 MG-0.5 MG/3 ML  3 mL Nebulization Q4H RT  
 budesonide (PULMICORT) 500 mcg/2 ml nebulizer suspension  500 mcg Nebulization BID RT  
 insulin lispro (HUMALOG) injection   SubCUTAneous Q6H  
 NOREPINephrine (LEVOPHED) 32 mg in 5% dextrose 250 mL infusion  0.5-30 mcg/min IntraVENous TITRATE Telemetry: []Sinus []A-flutter []Paced [x]A-fib []Multiple PVCs Physical Exam:  
  
General: Intubated/sedated; HEENT:  Anicteric sclerae; pink palpebral conjunctivae; mucosa moist 
Resp:  Symmetrical chest expansion, no accessory muscle use; good airway entry; no rales/ wheezing/ rhonchi noted CV:  S1, S2 present; regular rate and rhythm GI:  Abdomen soft, non-tender; (+) active bowel sounds, colostomy present Extremities:  +2 pulses on all extremities; no edema/ cyanosis/ clubbing noted b/l wrist restraints Skin:  Warm; no rashes/ lesions noted, normal turgor/cap refill Neurologic:  Non-focal 
Devices:  Colostomy, ogt, ett, CVL 
 
 DATA: 
MAR reviewed and pertinent medications noted or modified as needed Labs: 
Recent Labs 03/05/20 
0500 03/04/20 
0410 WBC 13.4* 23.9* HGB 8.4* 9.0*  
HCT 25.5* 28.1*  
PLT 77* 98* Recent Labs 03/05/20 
1825 03/05/20 
0854 03/05/20 
0030 03/04/20 
1240 * 151* 150* 146*  
K 3.4* 3.3* 2.9* 3.6 * 112* 111 110 CO2 32 31 31 28 * 231* 225* 234* BUN 24* 25* 22* 20* CREA 0.82 1.04 0.94 0.86 CA 8.1* 7.9* 7.7* 7.8*  
MG 2.0 2.0  --  1.9 PHOS 1.9* 2.1*  --  3.5 ALB 1.5* 1.5* 1.6* 1.5* SGOT 60* 20 21 37 ALT 17 14 14 15 No results for input(s): PH, PCO2, PO2, HCO3, FIO2 in the last 72 hours. Recent Labs 03/06/20 
8195 03/05/20 
1915 03/04/20 
0407 FIO2I 45 45 55 HCO3I 32.1* 31.8* 30.8* PCO2I 44.6 44.6 46.8* PHI 7.465* 7.461* 7.426 PO2I 123* 95 111* Imaging: 
[x]   I have personally reviewed the patients radiographs and reports XR Results (most recent): 
Results from [facility] Encounter encounter on 02/29/20 XR CHEST PORT Narrative PORTABLE CHEST RADIOGRAPH  
 
CPT CODE: 42317 INDICATION: Intubated. COMPARISON: 3/4/2020. FINDINGS: 
 
Frontal view of the chest obtained at 0458 hours. Incompletely assessed thoracic 
fusion. Tip of ET tube is not able to be visualized with overlying fusion 
hardware obscuring visualization. Right IJ central line, tip is also obscured by 
the hardware. Cardiomediastinal silhouette is unchanged. Lung base 
consolidations are not significantly changed, though possibly increased on the 
left. Perihilar haziness is unchanged. No pneumothorax. Impression IMPRESSION: 
 
1. Unable to visualize the tip of the ETT and right IJ central line secondary 
to overlying thoracic fusion hardware. 2.  No pneumothorax. 3.  Similar lung base consolidations, though possibly increased on the left. Suspect there are underlying pleural effusions. Consolidations may reflect atelectasis, pneumonia, and/or pulmonary edema. 4.  At least interstitial and possible edema. CT Results (most recent): 
[facility] Encounter encounter on 02/29/20 CT CHEST ABD PELV W CONT Narrative CT SCAN OF THE CHEST, ABDOMEN AND PELVIS, WITH CONTRAST INDICATION: Above. Arrived with shortness of breath and abdominal pain. Septic 
shock. Hypoxia. History of sarcoidosis/COPD. Altered. Abdominal distention and 
tympani. Intraperitoneal free air on chest radiograph. Perforated viscus. COMPARISON: Chest CT 1/21/2010. No prior abdominopelvic CT in PACS. Report is 
noted in the electronic medical record (care everywhere) of previous noncontrast 
enhanced abdominopelvic CT performed elsewhere 7/21/2014. TECHNIQUE: With IV administration of 70 mL Isovue-300, without administration of 
oral contrast, helically acquired serial axial CT images through the chest, 
abdomen and pelvis were obtained. Additional coronal and sagittal reformation 
images were also performed. All CT scans at this facility are performed using dose optimization technique as 
appropriate to the performed exam, to include automated exposure control, 
adjustment of the mA and/or kV according to patient's size (Including 
appropriate matching for site-specific examinations), or use of iterative 
reconstruction technique. FINDINGS: 
 
CT chest:  
 
Endotracheal tube tip is in place cephalad to the level of the alyssa. Esophagogastric tube is noted, tip in the mid stomach. Severe pulmonary emphysematous disease again seen with extensive bullous changes 
most conspicuous in the upper lungs. Scattered scarring. There has been interval 
development of consolidative appearing lung opacities in the lower lobes 
bilaterally consistent with airspace disease likely in addition to atelectasis. The small stable subpleural pulmonary nodule described in the right lower lobe on the recent chest CT is again seen, slightly better visualized on the current 
study measuring approximately 4 mm, unchanged compared to the CT of 2/6/2018 
(axial 34). Multifocal chronic nodular pleuro-parenchymal scarring in the left 
upper lobe without significant interval change compared to the preceding chest 
CT or study of 2/6/2018. No definite suspicious new pulmonary nodule/mass identified compared to the 
preceding CT. No evidence of pathologic lymph node enlargement is seen. No evidence of pleural or significant pericardial effusion. CT abdomen/pelvis:  
 
Small ascites, best seen around the liver and in the anterior pelvis adjacent to 
the distal descending and sigmoid colon. The spleen is heterogeneous in attenuation and demonstrates several rounded 
hypodensities as detailed on the recent chest CT of 1/21/2020. Indeterminate attenuation masses in the kidneys bilaterally, 2.7 cm right kidney 
upper to midpole laterally, 2.1 cm left kidney interpolar region posteriorly. Further evaluation recommended, beginning with ultrasound might be helpful when 
clinically feasible if the corresponding lesions can be identified 
sonographically. Additional smaller subcentimeter renal hypodensities 
bilaterally, possibly cysts, too small to be specifically characterized. The liver, gallbladder, pancreas, and adrenal glands appear unremarkable. Scattered calcifications in the abdominal aorta and its major branches. The 
abdominal aorta enhances normally without abdominal aortic aneurysm. A few scattered small subcentimeter short axis lymph nodes bilaterally in the 
pelvis or retroperitoneum. No evidence of pathologic lymph node enlargement is 
seen. Moderate quantity of intraperitoneal free air in the nondependent anterior 
abdomen, consistent with perforated viscus. Additional scattered foci of free 
air elsewhere in the peritoneum and mesentery, including in the right upper quadrant near the gallbladder/duodenum, and scattered bilaterally in the 
mesenteric/peritoneal fat of the upper and lower abdomen. Grouped small foci of 
intraperitoneal free air are noted close to the colonic wall along the 
left/anterior side of the sigmoid colon (reference axial 112-119). The left 
hemicolon is diffusely mildly distended to the rectum containing gas, stool, and 
hyperdense material. Clinical correlation might be helpful regarding recent 
ingestion of hyperdense material. The rectum measures approximately 8.5 cm in 
caliber. The sigmoid measures approximately 7.3 cm in caliber on axial image 115. The site of the or free viscus is uncertain. Common sites could include 
duodenal or gastric perforation such as may be seen due to perforated ulcer. However, there is small focal hypoattenuation along the wall of the sigmoid 
colon on series 2 axial images 111-113 which could potentially reflect a small 
mural defect such as could be seen related to constipation, stercoral colitis, 
diverticular disease. The appendix is seen within the ascites in the right lower quadrant, assessment 
for stranding in the periappendiceal fat limited by the ascites, without gross 
abnormal thickening of the appendix seen. No gas within the appendix. The right 
hemicolon appears grossly unremarkable. There is mild diffuse mural thickening of small bowel loops and mild prominence 
of enhancement, nonspecific. No definite associated pneumatosis. No portal 
venous gas is seen. The SMA/SMV appear to maintain usual orientation. Broadly 
speaking differential considerations could include inflammatory, infectious, 
ischemic. Clinical correlation is recommended including with lactate 
measurement. Conceivably sequela of peritonitis in the setting of bowel 
perforation? Uterus is present. Small gas is noted within the uterus and vagina, nonspecific, 
can be seen as a physiologic finding. On review of bone windows, there is a superior endplate compression fracture 
with mildly to moderately decreased vertebral body height at L2. Bilateral 
spinal fusion rods spanning from O8-B6-P66-T12. Severe compression fracture 
deformity again seen at T9. Compression fracture with rather pronounced 
decreased vertebral body height again seen at T5. Mild endplate indentations at 
several other levels including T4, T6, T10. The posterior midline fluid 
collection described on the preceding study was better visualized on the 
preceding study, extensive metallic hardware artifacts noted. Impression Impression: Moderate quantity of intraperitoneal free air. Findings are consistent with 
perforated viscus. The site of perforation is uncertain, as discussed above, 
possibly the sigmoid colon where there is apparent small subtle focal 
hypoattenuation along the wall as detailed above (reference axial images 111-113) close to region where several foci of free air are grouped. The left 
hemicolon is mildly distended and filled with hyperdense appearing stool. Mild diffuse mural thickening and enhancement of the small bowel as described. Small volume of ascites best seen around the liver and in the pelvis adjacent to 
the sigmoid. Bibasilar consolidations, right greater than left, suspicious for airspace 
disease/pneumonia. Indeterminate attenuation bilateral renal masses, follow-up evaluation 
recommended. Splenic hypodensities again seen as described on recent chest CT. Intubated. Severe pulmonary emphysematous disease and fibrotic changes. Multiple otherwise nonacute findings as detailed above. I have discussed these results directly with Dr. Susannah Wong in the ED at 12:20 p.m. 
in addition to the patient's consulted surgeon at 12:25 PM. IMPRESSION:  
· Acute on Chronic Respiratory Failure - Emergently intubated in ED for airway protection, 2/2 below. Now post-op and remains on mechanical ventilation. Chronically on 3L NC at home. · Acute Sigmoid Colon Perforation w/ peritonitis - s/p emergent ex-lap w/ drainage of intraabdominal stool and collections; sigmoid colectomy with Dr. Ebonie Sosa on 02/29/20. Subsequent washout and abdominal closure on 03/01/20. · Staph bacteremia - Grew on BCx collected 3/3. GNR bacteremia on 2/29, no regrowth · Hypokalemia - Likely 2/2 diuresis. · Hypophosphatemia- ?refeeding syndrome · Intermittent Tachyarrhythmia - afibb/flutter · Septic Shock due to peritonitis, UTI · Bilateral PNA - Sputum Cx collected 3/1 grew Moraxella catarrhalis. · Troponemia - demand ischemia, not an MI.  
· Hypothyroidism · ELIF - Likely pre-renal, 2/2 above. Initial ELIF resolved. Pt w/ mild increase in BUN/Cr w/ diuresis. · UTI - UCx grew E.coli. · T2DM  
· Paraparesis, in wheelchair: Recent Hx of Spine Thoracic Laminectomy T6-T11 with Fusion (12/11/20) with Dr. Vilma Wyman 2/2 Acute compression fx of T9-T10 and spinal cord compression at T9 and T10 and spinal cord edema with subsequent paraparesis. · Hx of Sarcoidosis - Chronic steroid use. · Hx of COPD, on chronic 3L NC, FEV1 51% in 2012, unable to perform repeat studies since  - Follows with Dr. Hai Saucedo in clinic. · Hx of Cocaine and Heroin Abuse - on methadone. Patient Active Problem List  
Diagnosis Code  Diabetic neuropathy associated with type 2 diabetes mellitus (HCC) E11.40  Venous insufficiency I87.2  Obesity, Class I, BMI 30-34.9 E66.9  Chronic back pain M54.9, G89.29  
 Generalized osteoarthritis of multiple sites M15.9  Bipolar affective disorder ([facility] F31.9  Abnormally low high density lipoprotein (HDL) cholesterol with hypertriglyceridemia E78.6, E78.1  Diastolic dysfunction without heart failure I51.89  Chronic obstructive pulmonary disease (COPD) ([facility] J44.9  Hypertensive heart disease without heart failure I11.9  Depression F32.9  History of back injury Z87.828  Type 2 diabetes mellitus with diabetic neuropathy, with long-term current use of insulin ([facility]) E11.40, Z79.4  Anxiety F41.9  Wears glasses Z97.3  History of hepatitis C Z86.19  
 Sickle cell trait ([facility]) D57.3  History of acute renal failure Z87.448  Hypothyroidism E03.9  Recurrent genital herpes A60.00  Sarcoidosis D86.9  Abscess of right arm L02.413  Hypoxemia requiring supplemental oxygen R09.02, Z99.81  
 Abnormal nuclear stress test R94.39  
 Cellulitis and abscess of hand L03.119, L02.519  
 Cellulitis and abscess of foot L03.119, L02.619  
 Cellulitis of arm, right L03. 113  
 Olecranon bursitis of right elbow M70.21  
 Contusion of left elbow S50. 02XA  Intravenous drug user F19.90  Right Achilles tendinitis M76.61  
 History of penicillin allergy Z88.0  Falls frequently R29.6  Gastroesophageal reflux disease with hiatal hernia K21.9, K44.9  Memory difficulty R41.3  Mixed connective tissue disease ([facility] M35.1  Impaired mobility and ADLs Z74.09  
 Hyperuricemia E79.0  History of vitamin D deficiency Z86.39  
 Urge urinary incontinence N39.41  Spinal cord compression ([facility]) G95.20  Compression fracture of body of thoracic vertebra ([facility]) S22.000A  Status post laminectomy with spinal fusion Z98.1  Acute blood loss as cause of postoperative anemia D62  
 History of fracture due to fall Z87.81  Chronic respiratory failure with hypoxia ([facility]) J96.11  
 Cellulitis of right forearm L03. 113  
 Gout M10.9  Menopause Z78.0  Long term current use of aspirin Z79.82  
 Opioid dependence ([facility]) F11.20  Tobacco use disorder F17.200  Sepsis ([facility] A41.9  Perforated viscus R19.8  Septic shock ([facility]) A41.9, R65.21  
 
  
RECOMMENDATIONS:  
Neuro:Titrate sedation for RASS goal 0 to -1, daily sedation holiday. Bilateral wrist restraints Pulm: Aspiration precautions, HOB>30'. VAP Bundle. Cont' scheduled nebs. CVS:Monitor HD, titrate levophed for MAP goal >65. Cont' amio gtt, cont' cardizem. Cards following. GI: NPO. Tube feed- advance per surgery. Monitor for refeeding. Renal: Trend Cr, UOP. Rousseau present. Hem/Onc: Trend H/H, monitor for s/o active bleeding. Daily CBC. Cont' solu-cortef I/D: Blood cultures. Antibiotics include zosyn, diflucan. ID following, afebrile overnight. Trend WBCs and temperature curve. Metabolic: D30 BMP, mag, phos. Trend lytes and replace per protocol. Endocrine:Q6 glucoses. SSI, lantus. Avoid hypoglycemia. Discuss with pharmacy and nutrition dosing of synthroid with TF vs IV Musc/Skin: Wound VAC per surgery Full Code Discussed in interdisciplinary rounds Best practice : 
 
Glycemic control IHI ICU bundles: 
 Central Line Bundle Followed , Rousseau Bundle Followed and Vent Bundle Followed, Vent Day 6 [facility] Vent patients- VAP bundle, aim to keep peak plateau pressure 63-77JS H2O Sress ulcer prophylaxis. DVT prophylaxis. Need for Lines, rousseau assessed. Restraints need. Palliative care evaluation. High complexity decision making was performed during this consultation and evaluation. [x]       Pt is at high risk for further organ failure and dysfunction. Abilio Schwartz PA-C 
03/06/20 Pulmonary, Critical Care Medicine [address] [place] [address] [facility]

## 2020-03-06 NOTE — PROGRESS NOTES
Discharge planning: 
 
Patient remains on the vent. This writer will continue to monitor for discharge planning to ensure a safe discharge from [facility]. Agnes Mohamud. [facility] Care Manager Pager#: [phone]

## 2020-03-06 NOTE — PROGRESS NOTES
[facility] 
 
 
CARDIOLOGY PROGRESS NOTE 
RECS: 
 
 
 
1. Paroxymal Atrial flutter/atrial fibrillation  with RVR- has had intermittent runs of afib/flutter early this am. currently NSR - continue  amiodarone drip. Not an ideal candidate for anticoagulation due to recent abdominal surgery and thrombocytopenia- will discuss with general surgery is ok to start asa. Recent  echo showed preserved EF% . 2. Acute Sigmoid Colon Perforation - s/p Emergent Ex-Lap with drainage of intraabdominal stool and collections; sigmoid colectomy with Dr. Emigdio Redman on 02/29/20.sigmoid perforation, secondary peritonitis  S/p Re-exploratory laparotomy, drainage of intra-abdominal stool collection, descending colectomy with mobilization of the splenic flexure, and transverse colostomy on 3/1. 
3. Septic Shock- on antibiotics managed by [facility] and ID 4. Hypotension- in the setting of #3 - hypotension overnight back on vasopressor support. 5. Acute on chronic diastolic heart failure-appears  compensated  
6. Hx sarcoidosis 7. Hx of abnormal Dobutamine  stress test- 11/2016  
8. Hx of Tobacco abuse 9. Pulmonary hypertension with PAP 80 mmHg on recent echo 10. Abnormal TSH- medications per [facility] care 
  
  
Patient with proximal atrial fibrillation on amiodarone. Continue to hold oral anticoagulation due to thrombocytopenia and recent surgery. Resume when okay with general surgery. Continue supportive care. 
 
  
11/2016 2. Dobutamine stress test was done, reported EF of 44% with a small 
reversible inferior wall defect.-medically reated 
  
 
    
02/29/20 ECHO ADULT FOLLOW-UP OR LIMITED 03/03/2020 3/3/2020  
  Narrative · Dilated right ventricle. Reduced systolic function. Abnormal right  
ventricular septal motion. Systolic flattening of interventricular septum  
consistent with right ventricle pressure overload. · Moderate tricuspid valve regurgitation is present. · Severely elevated central venous pressure (15+ mmHg); IVC diameter is  
larger than 21 mm and collapses less than 50% with respiration. · Severe pulmonary hypertension. · Pulmonary arterial systolic pressure is 57.4 mmHg · Normal cavity size, wall thickness and systolic function (ejection  
fraction normal). Estimated left ventricular ejection fraction is 55 -  
60%. · Dilated right atrium. 
   
    Signed by: Daniella Mc MD  
  
 
 
 
ASSESSMENT: 
Hospital Problems  Date Reviewed: 2/28/2020 Codes Class Noted POA Perforated viscus ICD-10-CM: R19.8 ICD-9-CM: 799.89  2/29/2020 Unknown Septic shock (HCC) ICD-10-CM: A41.9, R65.21 ICD-9-CM: 038.9, 785.52, 995.92  2/29/2020 Unknown SUBJECTIVE: Intubated OBJECTIVE: 
 
VS:  
Visit Vitals /57 Pulse 93 Temp 99.5 °F (37.5 °C) Resp 19 Ht 5' 4\" (1.626 m) Wt 90.1 kg (198 lb 10.2 oz) SpO2 96% BMI 34.10 kg/m² Intake/Output Summary (Last 24 hours) at 3/6/2020 1205 Last data filed at 3/6/2020 3060 Gross per 24 hour Intake 2365.42 ml Output 1565 ml Net 800.42 ml TELE: normal sinus rhythm General: well developed and chronically ill HENT: Normocephalic, atraumatic. Normal external eye. Neck :  JVD difficult to assess due to obesity Cardiac:  regular rate and rhythm, S1, S2 normal, no murmur, click, rub or gallop Lungs: diminished breath sounds b/l. Abdomen: Soft, nontender, no masses Extremities: +2 pedal edema  Also noted edema BUE, peripheral pulses present Labs: Results:  
   
Chemistry Recent Labs 03/06/20 
7987 03/05/20 
1825 03/05/20 
5804 * 194* 231* * 150* 151*  
K 3.5 3.4* 3.3*  
 113* 112* CO2 30 32 31 BUN 22* 24* 25* CREA 0.69 0.82 1.04  
CA 7.8* 8.1* 7.9* AGAP 6 5 8 BUCR 32* 29* 24* AP 66 68 72  
TP 5.4* 5.4* 5.4* ALB 1.5* 1.5* 1.5*  
GLOB 3.9 3.9 3.9 AGRAT 0.4* 0.4* 0.4* CBC w/Diff Recent Labs 03/06/20 
0549 03/05/20 0500 03/04/20 
0410 WBC 9.9 13.4* 23.9*  
RBC 3.10* 3.04* 3.34* HGB 8.4* 8.4* 9.0*  
HCT 26.3* 25.5* 28.1*  
PLT 80* 77* 98* GRANS 79* 83* 89* LYMPH 6* 10* 5* EOS 0 0 0 Cardiac Enzymes No results for input(s): CPK, CKND1, SANTOS in the last 72 hours. No lab exists for component: Darus Pilsner Coagulation No results for input(s): PTP, INR, APTT, INREXT in the last 72 hours. Lipid Panel Lab Results Component Value Date/Time Cholesterol, total 141 09/30/2019 12:00 AM  
 HDL Cholesterol 39 (L) 09/30/2019 12:00 AM  
 LDL, calculated 61 09/30/2019 12:00 AM  
 VLDL, calculated 41 (H) 09/30/2019 12:00 AM  
 Triglyceride 204 (H) 09/30/2019 12:00 AM  
 CHOL/HDL Ratio 3.5 11/06/2016 04:18 AM  
  
BNP No results for input(s): BNPP in the last 72 hours. Liver Enzymes Recent Labs 03/06/20 
0549  
TP 5.4* ALB 1.5* AP 66 SGOT 41* Thyroid Studies Lab Results Component Value Date/Time TSH 5.11 (H) 03/05/2020 06:25 PM  
    
 
 
 
Suma REYES [phone] supervised I have independently evaluated and examined the patient. All relevant labs and testing data's are reviewed. Care plan discussed and updated after review.  
 
Adrián Aranda MD

## 2020-03-06 NOTE — PROGRESS NOTES
Problem: Ventilator Management Goal: *Adequate oxygenation and ventilation Outcome: Progressing Towards Goal 
  
Problem: Ventilator Management Goal: *Patient maintains clear airway/free of aspiration Outcome: Progressing Towards Goal 
  
Problem: Ventilator Management Goal: *Absence of infection signs and symptoms Outcome: Progressing Towards Goal 
  
Problem: Non-Violent Restraints Goal: *No harm/injury to patient while restraints in use Outcome: Progressing Towards Goal 
  
Problem: Diabetes Self-Management Goal: *Disease process and treatment process Description Define diabetes and identify own type of diabetes; list 3 options for treating diabetes. Outcome: Progressing Towards Goal 
  
Problem: Diabetes Self-Management Goal: *Disease process and treatment process Description Define diabetes and identify own type of diabetes; list 3 options for treating diabetes. Outcome: Progressing Towards Goal 
  
Problem: Diabetes Self-Management Goal: *Monitoring blood glucose, interpreting and using results Description Identify recommended blood glucose targets  and personal targets. Outcome: Progressing Towards Goal 
  
Problem: Falls - Risk of 
Goal: *Absence of Falls Description Document Fredo Gutierrez Fall Risk and appropriate interventions in the flowsheet. Outcome: Progressing Towards Goal 
Note: Fall Risk Interventions: 
Mobility Interventions: Bed/chair exit alarm, Communicate number of staff needed for ambulation/transfer, Strengthening exercises (ROM-active/passive) Mentation Interventions: Adequate sleep, hydration, pain control, Bed/chair exit alarm, Door open when patient unattended, Update white board, Toileting rounds, Reorient patient, More frequent rounding, Familiar objects from home, Eyeglasses and hearing aids Medication Interventions: Evaluate medications/consider consulting pharmacy, Bed/chair exit alarm, Patient to call before getting OOB, Teach patient to arise slowly Elimination Interventions: Bed/chair exit alarm, Call light in reach, Elevated toilet seat, Patient to call for help with toileting needs, Stay With Me (per policy), Toilet paper/wipes in reach, Toileting schedule/hourly rounds History of Falls Interventions: Bed/chair exit alarm, Door open when patient unattended, Room close to nurse's station Problem: Pressure Injury - Risk of 
Goal: *Prevention of pressure injury Description Document Florian Scale and appropriate interventions in the flowsheet. Outcome: Progressing Towards Goal 
Note: Pressure Injury Interventions: 
Sensory Interventions: Assess changes in LOC, Assess need for specialty bed, Float heels, Keep linens dry and wrinkle-free, Maintain/enhance activity level, Minimize linen layers, Turn and reposition approx. every two hours (pillows and wedges if needed), Use 30-degree side-lying position, Pressure redistribution bed/mattress (bed type) Moisture Interventions: Absorbent underpads, Apply protective barrier, creams and emollients, Assess need for specialty bed, Check for incontinence Q2 hours and as needed, Internal/External urinary devices, Minimize layers Activity Interventions: Pressure redistribution bed/mattress(bed type), Assess need for specialty bed Mobility Interventions: Assess need for specialty bed, Float heels, HOB 30 degrees or less, Pressure redistribution bed/mattress (bed type), Turn and reposition approx. every two hours(pillow and wedges) Nutrition Interventions: Discuss nutritional consult with provider, Document food/fluid/supplement intake Friction and Shear Interventions: Foam dressings/transparent film/skin sealants, Apply protective barrier, creams and emollients, Minimize layers, HOB 30 degrees or less, Transferring/repositioning devices Problem: Patient Education: Go to Patient Education Activity Goal: Patient/Family Education Outcome: Not Progressing Towards Goal

## 2020-03-06 NOTE — PROGRESS NOTES
Infectious Disease progress Note Reason: sepsis, gram negative bacteremia,  
 
Current abx Prior abx Pip/tazo since 3/1 Cefepime, metronidazole 2/29-3/1 Vancomycin 2/29-3/2/20 Lines:  
 
 
Assessment : 
 
62 y.o. female with PMH of COPD (on 3LPM NC home O2), Sarcoidosis, HTN, T2DM (last hgbA1C 8.2 on 2/29),  Hep C, recent Spine Thoracic Laminectomy T6-T11 with Fusion (12/11/20) 2/2 compression fx of T9-T10 and spinal cord compression at T9 and T10 and spinal cord edema with subsequent paraparesis  who presented to [address] [place] [address] ED on 02/29/20 c/o acute abdominal pain, SOB. Clinical presentation c/w septic shock (POA) due to prevotella bloodstream infection (positive blood culture 2/29), sigmoid perforation, secondary peritonitis s/p emergent Ex lap on 2/29/20. Intra operatively found to have sigmoid colon perforation, 2/2 constipation with large amount of stool burden spilling into the peritoneum per Dr. Mendy Ruiz. S/p Re-exploratory laparotomy, drainage of intra-abdominal stool collection, descending colectomy with mobilization of the splenic flexure, and transverse colostomy on 3/1. Significant pyuria on UA 2/29. Urine culture >100,000 e.coli suggestive of probable cystitis. Acute on chronic respiratory failure - likely due to sepsis. Sputum culture 3/1- moraxella (beta lactamase positive). Diffuse pulmonary opacities concerning for aspiration pneumonia. Recurrent hypotension requiring pressors overnight. Single positive blood culture for coagulase negative staph on 3/3- likely contaminant. Blood culture 3/5: negative 
 
abx management complicated due to h/o pcn allergy. Currently tolerating pip/tazo without difficulties. Recommendations: 1. continue pip/tazo 2. cont fluconazole 3. F/u serum beta d glucan 4. F/u  blood culture 3/5 5. Monitor for intra abdominal abscess. 6. F/u surgery recommendations 7.titrate pressors as tolerated. Above plan was discussed in details with  RN. Please call me if any further questions or concerns. Will continue to participate in the care of this patient. HPI: 
 detailed ros not feasible since patient intubated. home Medication List  
 Details  
roflumilast (DALIRESP) 250 mcg tab Take 250 mcg by mouth daily. Qty: 30 Tab, Refills: 0  
  
ARIPiprazole (ABILIFY) 10 mg tablet Take 1 Tab by mouth. aspirin-calcium carbonate 81 mg-300 mg calcium(777 mg) tab Take 1 Tab by mouth. divalproex DR (DEPAKOTE) 250 mg tablet Take 1 Tab by mouth. doxycycline (MONODOX) 100 mg capsule   
  
gabapentin (NEURONTIN) 300 mg capsule Take 1 Cap by mouth. ipratropium (ATROVENT) 0.02 % soln   
  
methylPREDNISolone (MEDROL DOSEPACK) 4 mg tablet   
  
oxyCODONE-acetaminophen (PERCOCET) 5-325 mg per tablet Take 1 Tab by mouth. tolterodine (DETROL) 1 mg tablet Take 1 Tab by mouth. traMADol (ULTRAM) 50 mg tablet Take 1 Tab by mouth. traZODone (DESYREL) 100 mg tablet Take 1 Tab by mouth. !! busPIRone (BUSPAR) 15 mg tablet Take 15 mg by mouth two (2) times a day. Indications: repeated episodes of anxiety Qty: 60 Tab, Refills: 2 OXYGEN-AIR DELIVERY SYSTEMS 3 L/min by Nasal route continuous. First Choice O2  
  
albuterol-ipratropium (DUO-NEB) 2.5 mg-0.5 mg/3 ml nebu 3 mL by Nebulization route every six (6) hours as needed for Wheezing. Qty: 30 Nebule, Refills: 1  
  
!! busPIRone (BUSPAR) 10 mg tablet Take 10 mg by mouth three (3) times daily. Indications: repeated episodes of anxiety  
  
cholecalciferol (VITAMIN D3) (1000 Units /25 mcg) tablet Take 1,000 Units by mouth two (2) times a day. levothyroxine (SYNTHROID) 100 mcg tablet Take 100 mcg by mouth Daily (before breakfast). insulin glargine (LANTUS U-100 INSULIN) 100 unit/mL injection 20 Units by SubCUTAneous route Daily (before breakfast). famotidine (PEPCID) 20 mg tablet Take 20 mg by mouth nightly. aspirin 81 mg chewable tablet Take 1 Tab by mouth daily (with breakfast). Indications: stroke prevention 
Qty: 30 Tab, Refills: 0  
  
docusate sodium (COLACE) 100 mg capsule Take 1 Cap by mouth daily (after breakfast). Indications: constipation 
Qty: 30 Cap, Refills: 0  
  
predniSONE (DELTASONE) 20 mg tablet Take 20 mg by mouth daily (with breakfast). Indications: sarcoidosis Qty: 30 Tab, Refills: 0 Associated Diagnoses: Sarcoidosis  
  
ascorbic acid, vitamin C, (VITAMIN C) 250 mg tablet Take 1 Tab by mouth daily (with breakfast). Qty: 30 Tab, Refills: 0 Associated Diagnoses: Acute blood loss as cause of postoperative anemia  
  
calcium citrate 200 mg (950 mg) tablet Take 1 Tab by mouth two (2) times a day. Indications: post-menopausal osteoporosis prevention 
Qty: 60 Tab, Refills: 0 Associated Diagnoses: Status post laminectomy with spinal fusion  
  
ferrous sulfate 325 mg (65 mg iron) tablet Take 1 Tab by mouth daily (with breakfast). Qty: 30 Tab, Refills: 0 Associated Diagnoses: Acute blood loss as cause of postoperative anemia  
  
acetaminophen (TYLENOL) 325 mg tablet Take 2 Tabs by mouth every four (4) hours as needed for Pain. Indications: pain 
Qty: 60 Tab, Refills: 0 Associated Diagnoses: Status post laminectomy with spinal fusion  
  
brief disposable (ADULT) misc by Does Not Apply route. Qty: 1 Package, Refills: 0 Associated Diagnoses: Urge urinary incontinence  
  
methadone (DOLOPHINE) 5 mg tablet Take 4 Tabs by mouth daily. Max Daily Amount: 20 mg. 
Qty: 10 Tab, Refills: 0 Associated Diagnoses: History of back injury  
  
polyethylene glycol (MIRALAX) 17 gram/dose powder Take 17 g by mouth daily. 1 tablespoon with 8 oz of water daily 
Qty: 289 g, Refills: 0  
  
umeclidinium-vilanterol (ANORO ELLIPTA) 62.5-25 mcg/actuation inhaler Take 1 Puff by inhalation daily. Qty: 1 Inhaler, Refills: 0 BD INSULIN SYRINGE ULTRA-FINE 1 mL 31 gauge x 5/16 syrg rosuvastatin (CRESTOR) 5 mg tablet TAKE ONE TABLET NIGHTLY Qty: 90 Tab, Refills: 3  
  
albuterol (PROAIR HFA) 90 mcg/actuation inhaler INHALE 2 PUFFS EVERY 4 HOURS AS NEEDED FOR WHEEZING Qty: 1 Inhaler, Refills: 5  
  
oxybutynin (DITROPAN) 5 mg tablet Take 5 mg by mouth two (2) times a day. allopurinol (ZYLOPRIM) 100 mg tablet Take 100 mg by mouth daily. insulin lispro (HUMALOG) 100 unit/mL injection To be given 15 min before meals: < 150 - None, 151-200 - 2 u, 201-250 - 4 u, 251-300 - 6 u, 301-350 - 8 u, 351-400 - 10 u, >400 - 12 u Qty: 1 Vial, Refills: 0 Associated Diagnoses: Type 2 diabetes mellitus with stage 3 chronic kidney disease, with long-term current use of insulin ([facility])  
  
insulin syringe,safetyneedle 1 mL 30 gauge x 5/16\" syrg As directed Qty: 50 Each, Refills: 0 Nebulizer Accessories Kit Use with nebulizer machine Qty: 1 Kit, Refills: prn  
 Associated Diagnoses: COPD (chronic obstructive pulmonary disease) ([facility])  
  
sertraline (ZOLOFT) 50 mg tablet Take 50 mg by mouth daily. Indications: Bipolar Depression Current Facility-Administered Medications Medication Dose Route Frequency  levothyroxine (SYNTHROID) injection 75 mcg  75 mcg IntraVENous DAILY  midodrine (PROAMITINE) tablet 5 mg  5 mg Oral TID WITH MEALS  metoclopramide HCl (REGLAN) tablet 10 mg  10 mg Oral TIDAC  
 [START ON 3/7/2020] insulin glargine (LANTUS) injection 16 Units  16 Units SubCUTAneous DAILY  hydrocortisone Sod Succ (PF) (SOLU-CORTEF) injection 50 mg  50 mg IntraVENous V01W  
 multivit-folic acid-herbal 593 (WELLESSE PLUS) oral liquid 30 mL  30 mL Per NG tube DAILY  dilTIAZem (CARDIZEM) IR tablet 30 mg  30 mg Oral TIDAC  fluconazole (DIFLUCAN) 400mg/200 mL IVPB (premix)  400 mg IntraVENous Q24H  piperacillin-tazobactam (ZOSYN) 4.5 g in 0.9% sodium chloride (MBP/ADV) 100 mL MBP  4.5 g IntraVENous Q6H  
  acetaminophen (TYLENOL) solution 650 mg  650 mg Per NG tube Q4H PRN  propofol (DIPRIVAN) 10 mg/mL infusion  0-50 mcg/kg/min IntraVENous TITRATE  amiodarone (NEXTERONE) 360 mg in dextrose 200 mL (1.8 mg/mL) infusion  0.5-1 mg/min IntraVENous TITRATE  diphenhydrAMINE (BENADRYL) injection 25 mg  25 mg IntraVENous Q4H PRN  
 sodium chloride (NS) flush 5-10 mL  5-10 mL IntraVENous PRN  chlorhexidine (PERIDEX) 0.12 % mouthwash 10 mL  10 mL Oral Q12H  
 sodium chloride (NS) flush 5-40 mL  5-40 mL IntraVENous PRN  pantoprazole (PROTONIX) 40 mg in 0.9% sodium chloride 10 mL injection  40 mg IntraVENous Q12H  
 fentaNYL (PF) 900 mcg/30 ml infusion soln  0-200 mcg/hr IntraVENous TITRATE  midazolam (VERSED) injection 1-2 mg  1-2 mg IntraVENous Q30MIN PRN  
 fentaNYL citrate (PF) injection  mcg   mcg IntraVENous Q30MIN PRN  
 albuterol-ipratropium (DUO-NEB) 2.5 MG-0.5 MG/3 ML  3 mL Nebulization Q4H RT  
 albuterol (ACCUNEB) nebulizer solution 1.25 mg  1.25 mg Nebulization Q6H PRN  
 budesonide (PULMICORT) 500 mcg/2 ml nebulizer suspension  500 mcg Nebulization BID RT  
 insulin lispro (HUMALOG) injection   SubCUTAneous Q6H  
 glucose chewable tablet 16 g  4 Tab Oral PRN  
 glucagon (GLUCAGEN) injection 1 mg  1 mg IntraMUSCular PRN  
 dextrose (D50W) injection syrg 12.5-25 g  25-50 mL IntraVENous PRN  
 NOREPINephrine (LEVOPHED) 32 mg in 5% dextrose 250 mL infusion  0.5-30 mcg/min IntraVENous TITRATE Allergies: Moxifloxacin; Pcn [penicillins]; and Sulfa (sulfonamide antibiotics) Temp (24hrs), Av.9 °F (36.6 °C), Min:93.8 °F (34.3 °C), Max:100.6 °F (38.1 °C) Visit Vitals /57 Pulse 93 Temp 99.5 °F (37.5 °C) Resp 19 Ht 5' 4\" (1.626 m) Wt 90.1 kg (198 lb 10.2 oz) LMP 2012 (Exact Date) SpO2 96% BMI 34.10 kg/m² ROS: unable to obtain Physical Exam: 
 
General:  Intubated/Sedated, NAD  
 Head:  Normocephalic, without obvious abnormality, atraumatic. Eyes:  Conjunctivae/corneas clear. PERRL, Nose: Nares normal. Septum midline. Mucosa normal. No drainage or sinus tenderness. Throat: Lips, mucosa, and tongue normal. Teeth and gums normal.  
Neck: Supple, symmetrical, trachea midline, no adenopathy, no carotid bruit and no JVD. Lungs:   Symmetrical chest rise; good AE bilat; course throughout; no wheezes/rales noted. Heart:  RRR, S1, S2 normal, no m/r/g Abdomen:   Soft, appropriately tender. Distended. Non-rigid. +midline surgical incision, dressing c/d/i. MOHINDER drain to wall suction with mild sanguinous output. Absent bowel sounds. No masses,  No organomegaly. Extremities: Extremities normal, atraumatic, no cyanosis or edema. Pulses: 2+ and symmetric all extremities. Skin: Skin color, texture, turgor normal. No rashes or lesions Neurologic: Sedated Labs: Results:  
Chemistry Recent Labs 03/06/20 
0435 03/05/20 
1825 03/05/20 
3582 * 194* 231* * 150* 151*  
K 3.5 3.4* 3.3*  
 113* 112* CO2 30 32 31 BUN 22* 24* 25* CREA 0.69 0.82 1.04  
CA 7.8* 8.1* 7.9* AGAP 6 5 8 BUCR 32* 29* 24* AP 66 68 72  
TP 5.4* 5.4* 5.4* ALB 1.5* 1.5* 1.5*  
GLOB 3.9 3.9 3.9 AGRAT 0.4* 0.4* 0.4* CBC w/Diff Recent Labs 03/06/20 
0549 03/05/20 
0500 03/04/20 
0410 WBC 9.9 13.4* 23.9*  
RBC 3.10* 3.04* 3.34* HGB 8.4* 8.4* 9.0*  
HCT 26.3* 25.5* 28.1*  
PLT 80* 77* 98* GRANS 79* 83* 89* LYMPH 6* 10* 5* EOS 0 0 0 Microbiology Recent Labs 03/05/20 2025 03/03/20 
1505 03/03/20 
1504 CULT NO GROWTH AFTER 11 HOURS AEROBIC BOTTLE STAPHYLOCOCCUS EPIDERMIDIS* NO GROWTH 3 DAYS  
  
 
 
RADIOLOGY: 
 
All available imaging studies/reports in [facility] for this admission were reviewed Dr. Adri Mena, Infectious Disease Specialist 
[phone] March 6, 2020 
8:38 AM

## 2020-03-06 NOTE — PROGRESS NOTES
attended the interdisciplinary rounds for Quentin Sherwood, who is a 62 y.o.,female. Patients Primary Language is: [place]. According to the patients EMR [employer] Affiliation is: [place]. The reason the Patient came to the hospital is:  
Patient Active Problem List  
 Diagnosis Date Noted  Bipolar affective disorder ([address] 12/05/2012 Priority: 1 - One  Perforated viscus 02/29/2020  Septic shock ([address] 02/29/2020  Sepsis ([address] 01/17/2020  Chronic respiratory failure with hypoxia ([address]  Gout  Menopause  Long term current use of aspirin  Opioid dependence ([address]  Tobacco use disorder  Acute blood loss as cause of postoperative anemia 12/12/2019  Impaired mobility and ADLs 12/11/2019  Spinal cord compression ([address] 12/11/2019  Compression fracture of body of thoracic vertebra (HCC) 12/11/2019  Status post laminectomy with spinal fusion 12/11/2019  
 History of fracture due to fall 12/11/2019  Hyperuricemia 05/26/2017  Mixed connective tissue disease ([address] 05/10/2017  History of vitamin D deficiency 05/10/2017  Urge urinary incontinence 05/10/2017  History of penicillin allergy  Falls frequently  Gastroesophageal reflux disease with hiatal hernia  Memory difficulty  Cellulitis of arm, right 05/04/2017  Olecranon bursitis of right elbow 05/04/2017  Contusion of left elbow 05/04/2017  Cellulitis of right forearm 05/04/2017  Intravenous drug user 05/02/2017  Right Achilles tendinitis 05/02/2017  Cellulitis and abscess of hand 04/13/2017  Cellulitis and abscess of foot 04/13/2017  Abnormal nuclear stress test 11/17/2016  Hypoxemia requiring supplemental oxygen 12/29/2014  Abscess of right arm 06/03/2013  History of acute renal failure 05/31/2013  Recurrent genital herpes 05/31/2013  Hypothyroidism  Sarcoidosis  Diastolic dysfunction without heart failure  Chronic obstructive pulmonary disease (COPD) ([address]  Hypertensive heart disease without heart failure  Depression  History of back injury  Type 2 diabetes mellitus with diabetic neuropathy, with long-term current use of insulin ([address]  Anxiety  Wears glasses  History of hepatitis C   
 Sickle cell trait ([address]  Abnormally low high density lipoprotein (HDL) cholesterol with hypertriglyceridemia  Generalized osteoarthritis of multiple sites  Diabetic neuropathy associated with type 2 diabetes mellitus ([address]  Venous insufficiency  Obesity, Class I, BMI 30-34.9  Chronic back pain Plan: 
Chaplains will continue to follow and will provide pastoral care on an as needed/requested basis.  recommends bedside caregivers page  on duty if patient shows signs of acute spiritual or emotional distress. [address] [facility] Certified [employer] [facility]  
[phone]

## 2020-03-06 NOTE — DIABETES MGMT
GLYCEMIC CONTROL PLAN OF CARE Assessment/Recommendations: 
Blood glucose this am 214 mg/dl Noted pt receiving steroids Recommend increasing Lantus dose to 16 units daily. Corrective insulin coverage ordered as needed Advanced to very insulin resistant dosing. Will continue inpatient monitoring. Most recent blood glucose values: 
 
Results for Gregg Sparks (MRN [id number]) as of 3/6/2020 09:55 Ref. Range 3/5/2020 12:15 3/5/2020 17:17 3/6/2020 00:22 3/6/2020 05:53 GLUCOSE,FAST - POC Latest Ref Range: 70 - 110 mg/dL 220 (H) 201 (H) 187 (H) 250 (H) Current A1C of 8.2 % is equivalent to average blood glucose of 189_mg/dl over the past 2-3 months. Current hospital diabetes medications:  
Lispro corrective insulin coverage every 6 hours as needed Previous day's insulin requirements:  
Lantus 10 units Lispro 25 units corrective insulin Home diabetes medications:  Per pta med list 
Lantus 20 units daily before breakfast 
Humalog AC based on her blood sugar reading Diet:   
NPO. Trickle feeds at 10 cc currently. Education:  ____Refer to Diabetes Education Record  
          _x__Education not indicated at this time 
intubated Adrian Fernando RN CDE Ext [phone]

## 2020-03-06 NOTE — ROUTINE PROCESS
1930 Bedside shift change report given to Meron Navarro rn (oncoming nurse) by mckenna rn (offgoing nurse). Report included the following information SBAR and Kardex. 2000 assessment completed. Oral and rousseau care completed. 0000 reassessment completed. Oral and rousseau care completed. 0210 brandon reza notified uop and current blood pressure and levophed restarted. , will assess. 0400 reassessment completed. Oral and rousseau completed. 0730 Bedside shift change report given to jazz alvarado (oncoming nurse) by mckenna rn (offgoing nurse). Report included the following information SBAR and Kardex. Side rails up , call light within reach. Hob elevated 30 degrees. Dual drip verification completed.

## 2020-03-06 NOTE — PROGRESS NOTES
[facility]. Pulmonary, Critical Care, and Sleep Medicine Name: Abby Pleitez MRN: [id number] : 1955 Hospital: [address] [place] [address] Dr Date: 3/5/2020  Admission Date: 2020 Chart and notes reviewed. Data reviewed. I have evaluated all findings. [x]I have reviewed the flowsheet and previous days notes. []The patient is unable to give any meaningful history or review of systems because the patient is: 
[]Intubated []Sedated  
[]Unresponsive []The patient is critically ill on     
[]Mechanical ventilation []Pressors []BiPAP [] Interval HPI: 
62year old female, who was admitted to the ICU for respiratory failure secondary to bowel perforation. Patient was on pressors but currently off. Patient is currently on amiodarone for episodes of tachyarrhythmias, of which she has had none of during the night. Able to follow commands including squeezing fingers and tracking voices and movement. Cardiology following. Cardiology will not anticoagulate her due to recent abdominal surgery and thrombocytopenia. Started back on levophed during the night with pressure in the 13'Z systolic. Surgery date and post op date Subjective 20 Hospital Day: 7 Vent Day: 6 Overnight events: none Mentation/Activity: Sedation, responsive to pain stimulus Respiratory/ Secretions: Mechanically ventilation with little to no secretions Hemodynamics: Currently off vasopressures Urine output, bowel: urine output is 3025 ml within the last 24 hours Diet: Tube feed ROS:Review of systems not obtained due to patient factors. Events and notes from last 24 hours reviewed. Care plan discussed on multidisciplinary rounds. Patient Active Problem List  
Diagnosis Code  Diabetic neuropathy associated with type 2 diabetes mellitus (HCC) E11.40  Venous insufficiency I87.2  Obesity, Class I, BMI 30-34.9 E66.9  Chronic back pain M54.9, G89.29  
 Generalized osteoarthritis of multiple sites M15.9  Bipolar affective disorder ([facility] F31.9  Abnormally low high density lipoprotein (HDL) cholesterol with hypertriglyceridemia E78.6, E78.1  Diastolic dysfunction without heart failure I51.89  Chronic obstructive pulmonary disease (COPD) ([facility] J44.9  Hypertensive heart disease without heart failure I11.9  Depression F32.9  History of back injury Z87.828  Type 2 diabetes mellitus with diabetic neuropathy, with long-term current use of insulin ([facility]) E11.40, Z79.4  Anxiety F41.9  Wears glasses Z97.3  History of hepatitis C Z86.19  
 Sickle cell trait ([facility]) D57.3  History of acute renal failure Z87.448  Hypothyroidism E03.9  Recurrent genital herpes A60.00  Sarcoidosis D86.9  Abscess of right arm L02.413  Hypoxemia requiring supplemental oxygen R09.02, Z99.81  
 Abnormal nuclear stress test R94.39  
 Cellulitis and abscess of hand L03.119, L02.519  
 Cellulitis and abscess of foot L03.119, L02.619  
 Cellulitis of arm, right L03. 113  
 Olecranon bursitis of right elbow M70.21  
 Contusion of left elbow S50. 02XA  Intravenous drug user F19.90  Right Achilles tendinitis M76.61  
 History of penicillin allergy Z88.0  Falls frequently R29.6  Gastroesophageal reflux disease with hiatal hernia K21.9, K44.9  Memory difficulty R41.3  Mixed connective tissue disease ([facility] [address] M35.1  Impaired mobility and ADLs Z74.09  
 Hyperuricemia E79.0  History of vitamin D deficiency Z86.39  
 Urge urinary incontinence N39.41  Spinal cord compression ([facility]) G95.20  Compression fracture of body of thoracic vertebra ([facility]) S22.000A  Status post laminectomy with spinal fusion Z98.1  Acute blood loss as cause of postoperative anemia D62  
 History of fracture due to fall Z87.81  Chronic respiratory failure with hypoxia ([facility] [address] J96.11  
  Cellulitis of right forearm L03. 113  
 Gout M10.9  Menopause Z78.0  Long term current use of aspirin Z79.82  
 Opioid dependence (HCC) F11.20  Tobacco use disorder F17.200  Sepsis ([address] A41.9  Perforated viscus R19.8  Septic shock (HCC) A41.9, R65.21 Vital Signs: 
Visit Vitals /83 (BP 1 Location: Right arm, BP Patient Position: At rest) Pulse 75 Temp 97.5 °F (36.4 °C) Resp 18 Ht 5' 4\" (1.626 m) Wt 88.1 kg (194 lb 3.6 oz) LMP 2012 (Exact Date) SpO2 100% BMI 33.34 kg/m² O2 Device: Endotracheal tube, Ventilator Temp (24hrs), Av.5 °F (37.5 °C), Min:93.8 °F (34.3 °C), Max:101.2 °F (38.4 °C) Intake/Output:  
Last shift:      1901 -  0700 In: 149.2 [I.V.:19.2] Out: 200 [Urine:200] Last 3 shifts:  07 -  1900 In: 2685.4 [I.V.:2025.4] Out: 5570 [CGUDE:9174] Intake/Output Summary (Last 24 hours) at 3/5/2020 2147 Last data filed at 3/5/2020 2027 Gross per 24 hour Intake 1738.92 ml Output 3025 ml Net -1286.08 ml Ventilator Settings: 
Ventilator Mode: VC+ Respiratory Rate Resp Rate Observed: 27 Back-Up Rate: 16 Insp Time (sec): 1 sec Insp Flow (l/min): 1.15 l/min I:E Ratio: 1;2.3 Ventilator Volumes Vt Set (ml): 450 ml Vt Exhaled (Machine Breath) (ml): 478 ml Vt Spont (ml): 507 ml Ve Observed (l/min): 7.6 l/min Ventilator Pressures Pressure Support (cm H2O): 7 cm H2O 
PIP Observed (cm H2O): 20 cm H2O Plateau Pressure (cm H2O): 17 cm H2O 
MAP (cm H2O): 10 PEEP/VENT (cm H2O): 7 cm H20(Per Spo2) Auto PEEP Observed (cm H2O): 0 cm H2O Current Facility-Administered Medications Medication Dose Route Frequency  hydrocortisone Sod Succ (PF) (SOLU-CORTEF) injection 50 mg  50 mg IntraVENous Q12H  
 insulin glargine (LANTUS) injection 10 Units  10 Units SubCUTAneous DAILY  multivit-folic acid-herbal 869 (WELLESSE PLUS) oral liquid 30 mL  30 mL Per NG tube DAILY  dilTIAZem (CARDIZEM) IR tablet 30 mg  30 mg Oral TIDAC  sodium phosphate 6 mmol in 0.9% sodium chloride 250 mL infusion  6 mmol IntraVENous ONCE  potassium chloride 10 mEq in 100 ml IVPB  10 mEq IntraVENous Q1H  
 fluconazole (DIFLUCAN) 400mg/200 mL IVPB (premix)  400 mg IntraVENous Q24H  piperacillin-tazobactam (ZOSYN) 4.5 g in 0.9% sodium chloride (MBP/ADV) 100 mL MBP  4.5 g IntraVENous Q6H  
 propofol (DIPRIVAN) 10 mg/mL infusion  0-50 mcg/kg/min IntraVENous TITRATE  amiodarone (NEXTERONE) 360 mg in dextrose 200 mL (1.8 mg/mL) infusion  0.5-1 mg/min IntraVENous TITRATE  chlorhexidine (PERIDEX) 0.12 % mouthwash 10 mL  10 mL Oral Q12H  pantoprazole (PROTONIX) 40 mg in 0.9% sodium chloride 10 mL injection  40 mg IntraVENous Q12H  
 fentaNYL (PF) 900 mcg/30 ml infusion soln  0-200 mcg/hr IntraVENous TITRATE  albuterol-ipratropium (DUO-NEB) 2.5 MG-0.5 MG/3 ML  3 mL Nebulization Q4H RT  
 budesonide (PULMICORT) 500 mcg/2 ml nebulizer suspension  500 mcg Nebulization BID RT  
 insulin lispro (HUMALOG) injection   SubCUTAneous Q6H  
 NOREPINephrine (LEVOPHED) 32 mg in 5% dextrose 250 mL infusion  0.5-30 mcg/min IntraVENous TITRATE Telemetry: []Sinus []A-flutter []Paced []A-fib []Multiple PVCs Physical Exam:  
  
General: Intubated/sedated; HEENT:  Anicteric sclerae; pink palpebral conjunctivae; mucosa moist 
Resp:  Symmetrical chest expansion, no accessory muscle use; good airway entry; no rales/ wheezing/ rhonchi/stridor noted CV:  S1, S2 present; regular rate and rhythm GI:  Abdomen soft, non-tender; (+) active bowel sounds. Colostomy noted - tissue is pink. Wound VAC. Extremities:  +2 pulses on all extremities; +2 pitting edema on upper extremities. No cyanosis/ clubbing noted. Bilateral wrist restraints Skin:  Warm; no rashes/ lesions noted, normal turgor/cap refill Neurologic:  Sedated, responsive to commands. Devices:  NGT, Central line, A line, ETT present. DATA: 
MAR reviewed and pertinent medications noted or modified as needed Labs: 
Recent Labs 03/05/20 
0500 03/04/20 
0410 03/03/20 
0440 WBC 13.4* 23.9* 26.1* HGB 8.4* 9.0* 9.5* HCT 25.5* 28.1* 28.6* PLT 77* 98* 168 Recent Labs 03/05/20 
1825 03/05/20 
0854 03/05/20 
0030 03/04/20 
1240 * 151* 150* 146*  
K 3.4* 3.3* 2.9* 3.6 * 112* 111 110 CO2 32 31 31 28 * 231* 225* 234* BUN 24* 25* 22* 20* CREA 0.82 1.04 0.94 0.86 CA 8.1* 7.9* 7.7* 7.8*  
MG 2.0 2.0  --  1.9 PHOS 1.9* 2.1*  --  3.5 ALB 1.5* 1.5* 1.6* 1.5* SGOT 60* 20 21 37 ALT 17 14 14 15 No results for input(s): PH, PCO2, PO2, HCO3, FIO2 in the last 72 hours. Recent Labs 03/05/20 
9396 03/04/20 
0407 03/03/20 
1018 FIO2I 45 55 60 HCO3I 31.8* 30.8* 25.8 PCO2I 44.6 46.8* 45.4* PHI 7.461* 7.426 7.367 PO2I 95 111* 96 Imaging: 
[x]   I have personally reviewed the patients radiographs and reports XR Results (most recent): 
Results from [facility] [place] on [place]/29/20 XR CHEST PORT Narrative PORTABLE CHEST RADIOGRAPH  
 
CPT CODE: 93175 INDICATION: Intubated. COMPARISON: 3/4/2020. FINDINGS: 
 
Frontal view of the chest obtained at 0458 hours. Incompletely assessed thoracic 
fusion. Tip of ET tube is not able to be visualized with overlying fusion 
hardware obscuring visualization. Right IJ central line, tip is also obscured by 
the hardware. Cardiomediastinal silhouette is unchanged. Lung base 
consolidations are not significantly changed, though possibly increased on the 
left. Perihilar haziness is unchanged. No pneumothorax. Impression IMPRESSION: 
 
1. Unable to visualize the tip of the ETT and right IJ central line secondary 
to overlying thoracic fusion hardware. 2.  No pneumothorax. 3.  Similar lung base consolidations, though possibly increased on the left. Suspect there are underlying pleural effusions. Consolidations may reflect 
atelectasis, pneumonia, and/or pulmonary edema. 4.  At least interstitial and possible edema. CT Results (most recent): 
[place] [facility] [place] on 02/29/20 CT CHEST ABD PELV W CONT Narrative CT SCAN OF THE CHEST, ABDOMEN AND PELVIS, WITH CONTRAST INDICATION: Above. Arrived with shortness of breath and abdominal pain. Septic 
shock. Hypoxia. History of sarcoidosis/COPD. Altered. Abdominal distention and 
tympani. Intraperitoneal free air on chest radiograph. Perforated viscus. COMPARISON: Chest CT 1/21/2010. No prior abdominopelvic CT in PACS. Report is 
noted in the electronic medical record (care everywhere) of previous noncontrast 
enhanced abdominopelvic CT performed elsewhere 7/21/2014. TECHNIQUE: With IV administration of 70 mL Isovue-300, without administration of 
oral contrast, helically acquired serial axial CT images through the chest, 
abdomen and pelvis were obtained. Additional coronal and sagittal reformation 
images were also performed. All CT scans at this facility are performed using dose optimization technique as 
appropriate to the performed exam, to include automated exposure control, 
adjustment of the mA and/or kV according to patient's size (Including 
appropriate matching for site-specific examinations), or use of iterative 
reconstruction technique. FINDINGS: 
 
CT chest:  
 
Endotracheal tube tip is in place cephalad to the level of the alyssa. Esophagogastric tube is noted, tip in the mid stomach. Severe pulmonary emphysematous disease again seen with extensive bullous changes 
most conspicuous in the upper lungs. Scattered scarring. There has been interval 
development of consolidative appearing lung opacities in the lower lobes 
bilaterally consistent with airspace disease likely in addition to atelectasis. The small stable subpleural pulmonary nodule described in the right lower lobe 
on the recent chest CT is again seen, slightly better visualized on the current 
study measuring approximately 4 mm, unchanged compared to the CT of 2/6/2018 
(axial 34). Multifocal chronic nodular pleuro-parenchymal scarring in the left 
upper lobe without significant interval change compared to the preceding chest 
CT or study of 2/6/2018. No definite suspicious new pulmonary nodule/mass identified compared to the 
preceding CT. No evidence of pathologic lymph node enlargement is seen. No evidence of pleural or significant pericardial effusion. CT abdomen/pelvis:  
 
Small ascites, best seen around the liver and in the anterior pelvis adjacent to 
the distal descending and sigmoid colon. The spleen is heterogeneous in attenuation and demonstrates several rounded 
hypodensities as detailed on the recent chest CT of 1/21/2020. Indeterminate attenuation masses in the kidneys bilaterally, 2.7 cm right kidney 
upper to midpole laterally, 2.1 cm left kidney interpolar region posteriorly. Further evaluation recommended, beginning with ultrasound might be helpful when 
clinically feasible if the corresponding lesions can be identified 
sonographically. Additional smaller subcentimeter renal hypodensities 
bilaterally, possibly cysts, too small to be specifically characterized. The liver, gallbladder, pancreas, and adrenal glands appear unremarkable. Scattered calcifications in the abdominal aorta and its major branches. The 
abdominal aorta enhances normally without abdominal aortic aneurysm. A few scattered small subcentimeter short axis lymph nodes bilaterally in the 
pelvis or retroperitoneum. No evidence of pathologic lymph node enlargement is 
seen. Moderate quantity of intraperitoneal free air in the nondependent anterior 
abdomen, consistent with perforated viscus. Additional scattered foci of free air elsewhere in the peritoneum and mesentery, including in the right upper 
quadrant near the gallbladder/duodenum, and scattered bilaterally in the 
mesenteric/peritoneal fat of the upper and lower abdomen. Grouped small foci of 
intraperitoneal free air are noted close to the colonic wall along the 
left/anterior side of the sigmoid colon (reference axial 112-119). The left 
hemicolon is diffusely mildly distended to the rectum containing gas, stool, and 
hyperdense material. Clinical correlation might be helpful regarding recent 
ingestion of hyperdense material. The rectum measures approximately 8.5 cm in 
caliber. The sigmoid measures approximately 7.3 cm in caliber on axial image 115. The site of the or free viscus is uncertain. Common sites could include 
duodenal or gastric perforation such as may be seen due to perforated ulcer. However, there is small focal hypoattenuation along the wall of the sigmoid 
colon on series 2 axial images 111-113 which could potentially reflect a small 
mural defect such as could be seen related to constipation, stercoral colitis, 
diverticular disease. The appendix is seen within the ascites in the right lower quadrant, assessment 
for stranding in the periappendiceal fat limited by the ascites, without gross 
abnormal thickening of the appendix seen. No gas within the appendix. The right 
hemicolon appears grossly unremarkable. There is mild diffuse mural thickening of small bowel loops and mild prominence 
of enhancement, nonspecific. No definite associated pneumatosis. No portal 
venous gas is seen. The SMA/SMV appear to maintain usual orientation. Broadly 
speaking differential considerations could include inflammatory, infectious, 
ischemic. Clinical correlation is recommended including with lactate 
measurement. Conceivably sequela of peritonitis in the setting of bowel 
perforation? Uterus is present.  Small gas is noted within the uterus and vagina, nonspecific, 
can be seen as a physiologic finding. On review of bone windows, there is a superior endplate compression fracture 
with mildly to moderately decreased vertebral body height at L2. Bilateral 
spinal fusion rods spanning from I3-O5-I15-T12. Severe compression fracture 
deformity again seen at T9. Compression fracture with rather pronounced 
decreased vertebral body height again seen at T5. Mild endplate indentations at 
several other levels including T4, T6, T10. The posterior midline fluid 
collection described on the preceding study was better visualized on the 
preceding study, extensive metallic hardware artifacts noted. Impression Impression: Moderate quantity of intraperitoneal free air. Findings are consistent with 
perforated viscus. The site of perforation is uncertain, as discussed above, 
possibly the sigmoid colon where there is apparent small subtle focal 
hypoattenuation along the wall as detailed above (reference axial images 111-113) close to region where several foci of free air are grouped. The left 
hemicolon is mildly distended and filled with hyperdense appearing stool. Mild diffuse mural thickening and enhancement of the small bowel as described. Small volume of ascites best seen around the liver and in the pelvis adjacent to 
the sigmoid. Bibasilar consolidations, right greater than left, suspicious for airspace 
disease/pneumonia. Indeterminate attenuation bilateral renal masses, follow-up evaluation 
recommended. Splenic hypodensities again seen as described on recent chest CT. Intubated. Severe pulmonary emphysematous disease and fibrotic changes. Multiple otherwise nonacute findings as detailed above. I have discussed these results directly with Dr. Johnathan Foley in the ED at 12:20 p.m. 
in addition to the patient's consulted surgeon at 12:25 PM. IMPRESSION:  
· Bowel viscus perforation resulting in septic shock - post op day 7 from an abdominal closure and colonostomy surgery · Resolving septic shock - improving leukocytosis, on/off vasopressures, hyper/hypotensive, normal HR, afebrile, worsening prerenal acute kidney injury · Acute respiratory failure secondary to above · Episodes of tachyarrhythmia - cardiology following, currently off amiodarone drip · Thrombocytopenia · Severe pulmonary hypertension · COPD - on daily oxygen. Fi02 of 91%, · Daily smoker · Staph Gram positive and Gram negative bacteremia - ID following · Bilateral pneumonia - heavy Morexella respiratory culture · Hypothyroidism - levothyroxine · UTI -  E. coli · ELIF - resolving · Lactic acidosis - resolving · Hx of sarcoidosis Patient Active Problem List  
Diagnosis Code  Diabetic neuropathy associated with type 2 diabetes mellitus (HCC) E11.40  Venous insufficiency I87.2  Obesity, Class I, BMI 30-34.9 E66.9  Chronic back pain M54.9, G89.29  
 Generalized osteoarthritis of multiple sites M15.9  Bipolar affective disorder ([facility] [address] F31.9  Abnormally low high density lipoprotein (HDL) cholesterol with hypertriglyceridemia E78.6, E78.1  Diastolic dysfunction without heart failure I51.89  Chronic obstructive pulmonary disease (COPD) ([facility] [address] J44.9  Hypertensive heart disease without heart failure I11.9  Depression F32.9  History of back injury Z87.828  Type 2 diabetes mellitus with diabetic neuropathy, with long-term current use of insulin ([facility]) E11.40, Z79.4  Anxiety F41.9  Wears glasses Z97.3  History of hepatitis C Z86.19  
 Sickle cell trait ([facility]) D57.3  History of acute renal failure Z87.448  Hypothyroidism E03.9  Recurrent genital herpes A60.00  Sarcoidosis D86.9  Abscess of right arm L02.413  Hypoxemia requiring supplemental oxygen R09.02, Z99.81  
 Abnormal nuclear stress test R94.39  
 Cellulitis and abscess of hand L03.119, L02.519  
 Cellulitis and abscess of foot L03.119, L02.619  
  Cellulitis of arm, right L03. 113  
 Olecranon bursitis of right elbow M70.21  
 Contusion of left elbow S50. 02XA  Intravenous drug user F19.90  Right Achilles tendinitis M76.61  
 History of penicillin allergy Z88.0  Falls frequently R29.6  Gastroesophageal reflux disease with hiatal hernia K21.9, K44.9  Memory difficulty R41.3  Mixed connective tissue disease ([facility] [address] M35.1  Impaired mobility and ADLs Z74.09  
 Hyperuricemia E79.0  History of vitamin D deficiency Z86.39  
 Urge urinary incontinence N39.41  Spinal cord compression ([facility]) G95.20  Compression fracture of body of thoracic vertebra ([facility]) S22.000A  Status post laminectomy with spinal fusion Z98.1  Acute blood loss as cause of postoperative anemia D62  
 History of fracture due to fall Z87.81  Chronic respiratory failure with hypoxia ([facility]) J96.11  
 Cellulitis of right forearm L03. 113  
 Gout M10.9  Menopause Z78.0  Long term current use of aspirin Z79.82  
 Opioid dependence ([facility]) F11.20  Tobacco use disorder F17.200  Sepsis [facility] A41.9  Perforated viscus R19.8  Septic shock ([facility]) A41.9, R65.21  
 
  
RECOMMENDATIONS:  
Neuro:Titrate sedation for RASS goal 0 to -1, daily sedation holiday. Bilateral wrist restraints Pulm: Aspiration precautions, HOB>30'. VAP Bundle CVS:Monitor HD, MAP goal >65. Vasopressure. Amiodarone drip GI: NPO. Tube feed Renal: Trend Cr, UOP. Renae present. Hem/Onc: Trend H/H, monitor for s/o active bleeding. Daily CBC. I/D: Blood cultures. Antibiotics include zosyn. Trend WBCs and temperature curve. Metabolic: Daily BMP, mag, phos. Trend lytes and replace per protocol. Endocrine:Q6 glucoses. SSI. Avoid hypoglycemia. Musc/Skin: Wound VAC per surgery Full Code Discussed in interdisciplinary rounds Best practice : 
 
Glycemic control IHI ICU bundles: 
 Central Line Bundle Followed [facility] Vent patients- VAP bundle, aim to keep peak plateau pressure 93-25HI H2O Sress ulcer prophylaxis. DVT prophylaxis. Need for Lines, rousseau assessed. Restraints need. Palliative care evaluation. High complexity decision making was performed during this consultation and evaluation. [x]       Pt is at high risk for further organ failure and dysfunction. Critical care time spent:    minutes with patient exclusive of procedures. Brandon POWELL student 03/05/20 Pulmonary, Critical Care Medicine [facility]

## 2020-03-06 NOTE — PROGRESS NOTES
Patient seen and examined She is actually doing much better Still on no pressors. No fever or tachycardia. WBC is normal 
Creatinine is normal. Good urine output Wound VAC in place Abdomen is soft and non-tender Colostomy is viable but not functioning yet On tube feed. Plan: 
Appreciate medicine and ICU teams management I except a prolobnged ileus given her history of narcotics use, back surgery and partial paralysis and of course the infection/perforation she had. Continue with tube feed as tolerated Wean to extubate Antibiotics per ID Ostomy care Wound VAC Will follow.

## 2020-03-06 NOTE — PROGRESS NOTES
NUTRITION Nutrition Screen RECOMMENDATIONS / PLAN:  
 
- Recommend starting promotility agent and bowel regimen to promote feeding tolerance. - Advance tube feeding of Vital AF 1.2 as tolerated by 10 mL q 12 hours to goal rate of 40 mL/hr with Prosource TID, daily multivitamin and 100 mL q 4 hour water flushes. Monitor residuals every 4 hours and hold feedings for volume over 500 mL. - Monitor feeding tolerance and need for supplemental parenteral nutrition.   
- Continue RD inpatient monitoring and evaluation. Goal Enteral Regimen: Vital AF at 40 mL/hr + Prosource TID + 100 mL q 4 hour water flushes to provide: 1332 kcal, 117 gm protein, 52 gm fat, 106 gm CHO, 5 gm fiber, 779 mL free water, 1379 mL total free water, 81% RDIs NUTRITION INTERVENTIONS & DIAGNOSIS:  
 
- Enteral nutrition: advance, verbal order from MD  
- Vitamin and mineral supplement therapy: multivitamin - Nutrition related medication management: metoclopramide, miralax added; consider movantik - Collaboration and referral of nutrition care: interdisciplinary rounds, feeding regimen discussed with Dr Nancy Troy Nutrition Diagnosis: Inadequate oral intake related to respiratory status, altered GI function as evidenced by pt NPO. Altered GI function related to chronic constipation, narcotic use and critically ill as evidenced by sigmoid colon perforation s/p sigmoid colectomy with postop impaired gut motility. ASSESSMENT:  
 
3/6: Feeds advanced to 20 mL/hr then rate dropped back down to 10 mL/hr after 200 mL residual. Pt with chronic constipation, hx of narcotic use, partial paralysis and prolonged ileus anticipated per MD. Lantus increased for hyperglycemia, hypernatremia improving. 3/5: Tolerating trickle feeds, hypernatremia noted. 3/4: Minimal out from colostomy with 1100 mL out from NGT- will start trial of trickle feeding and monitor. 3/3: Remains intubated, no nausea/vomiting, no ostomy function.  No output documented from NGT.  
3/2: S/p ex lap, descending colectomy and creation of colostomy on 3/1 then re-ex lap, drainage of intra-abdominal stool collection, descending colectomy with mobilization of the splenic flexure and transverse colostomy. Remains intubated on the vent postop. Nutritional intake adequate to meet patients estimated nutritional needs:  No  
 
Tube Feeding: Vital AF 1.2 at 10 mL/hr via NGT Water Flushes: 100 mL q 4 hours Residuals:  mL this morning per RN Diet: DIET TUBE FEEDING Please monitor residual volume every 4 hours; hold for over 200 mL and contact MD. Please increase to goal rate of 40 mL/hr once propofol is stopped. DIET NUTRITIONAL SUPPLEMENTS Breakfast, Lunch, Dinner; [employer] Food Allergies: NKFA Current Appetite: Not Applicable - NPO Appetite/meal intake prior to admission: Unable to determine at this time Feeding Limitations:  [] Swallowing difficulty    [] Chewing difficulty    [x] Other: respiratory status Current Meal Intake: No data found. BM: 3/6; colostomy (120 mL serosanguinous output) Skin Integrity: abdominal surgical incision; MOHINDER drain Edema:   [] No     [x] Yes Pertinent Medications: Reviewed: SSI, steroid, 16 units lantus, pantoprazole, levothyroxine IV, norepinephrine, propofol- off, 2 gm Ca, 2 gm Mg, 40 mEq K Bicarb (50 mEq KCl, 9 mmol K Phos given overnight) Recent Labs 03/06/20 
9798 03/05/20 
1825 03/05/20 
2695 * 150* 151*  
K 3.5 3.4* 3.3*  
 113* 112* CO2 30 32 31 * 194* 231* BUN 22* 24* 25* CREA 0.69 0.82 1.04  
CA 7.8* 8.1* 7.9*  
MG 1.8 2.0 2.0 PHOS 2.8 1.9* 2.1* ALB 1.5* 1.5* 1.5* SGOT 41* 60* 20 ALT 19 17 14 Intake/Output Summary (Last 24 hours) at 3/6/2020 2171 Last data filed at 3/6/2020 7023 Gross per 24 hour Intake 2603.21 ml Output 1965 ml Net 638.21 ml Anthropometrics: 
Ht Readings from Last 1 Encounters:  
03/03/20 5' 4\" (1.626 m) Last 3 Recorded Weights in this Encounter 03/03/20 1457 03/04/20 0530 03/06/20 8068 Weight: 87.1 kg (192 lb) 88.1 kg (194 lb 3.6 oz) 90.1 kg (198 lb 10.2 oz) Body mass index is 34.1 kg/m². Obese, Class I Weight History: previously reported intentional weight loss and previous usual weight of 230 lb Weight Metrics 3/6/2020 2/28/2020 1/22/2020 1/17/2020 1/2/2020 12/16/2019 12/14/2019 Weight 198 lb 10.2 oz - 171 lb 11.2 oz - 172 lb 6.4 oz - 178 lb 9.6 oz BMI 34.1 kg/m2 28.57 kg/m2 - 28.57 kg/m2 - 28.69 kg/m2 - Some encounter information is confidential and restricted. Go to Review Flowsheets activity to see all data. Admitting Diagnosis: Septic shock ([facility] [address] [A41.9, R65.21] Perforated viscus [R19.8] Pertinent PMHx: COPD, HTN, DM, hepatitis C, sarcoidosis, methadone use, GERD with hiatal hernia, hypothyroidism Procedures: Spine Thoracic Laminectomy T6-T11 with Fusion (12/11/20) 2/2 compression fx of T9-T10 and spinal cord compression at T9 and T10 and spinal cord edema with subsequent paraparesis Education Needs:        [x] None identified  [] Identified - Not appropriate at this time  []  Identified and addressed - refer to education log Learning Limitations:   [] None identified  [x] Identified: intubated Cultural, Congregation & ethnic food preferences:  [x] None identified    [] Identified and addressed ESTIMATED NUTRITION NEEDS:  
 
Calories: 946-1204 kcal (11-14 kcal/kg) based on  [x] Actual BW 86 kg     [] IBW Protein: 110-138 gm (2-2.5 gm/kg) based on  [] Actual BW      [x] IBW 55 kg Fluid: 1 mL/kcal 
  
MONITORING & EVALUATION:  
 
Nutrition Goal(s):  
- Nutritional needs will be met through adequate oral intake or nutrition support within the next 7 days.   Outcome: Progressing towards goal  
 
Monitoring:   [x] Food and nutrient intake   [x] Food and nutrient administration  [x] Comparative standards   [x] Nutrition-focused physical findings   [x] Anthropometric Measurements   [x] Treatment/therapy   [x] Biochemical data, medical tests, and procedures Previous Recommendations (for follow-up assessments only): Implemented Discharge Planning: Nutritional discharge needs unknown at this time. Participated in care planning, discharge planning, & interdisciplinary rounds as appropriate. Ortega Mcdaniel RD, [facility] Pager: [phone]

## 2020-03-06 NOTE — PROGRESS NOTES
SBT initiated per Dr. Christine Arroyo, PS 7, peep 5, FiO2 40% 03/06/20 1430 Weaning Parameters Spontaneous Breathing Trial Complete Yes Resp Rate Observed 24 Ve 8.1  RSBI 63 Patient tolerating, will continue to monitor.

## 2020-03-07 NOTE — PROGRESS NOTES
Interim events noted. Temp of 100.9 overnight. Blood culture 3/5 one set positive for GPCCL. Will start empiric vancomycin while awaiting ID and susceptibility of gpc. Recommend removal of right IJ CVC if patient can be weaned off pressors or alternative IV access feasible.

## 2020-03-07 NOTE — ROUTINE PROCESS
Bedside and Verbal shift change report given to rey fuller rn (oncoming nurse) by June Montoya RN (offgoing nurse). Report included the following information SBAR, Kardex, MAR and Recent Results. SITUATION:  
? Code Status: Full Code 
? Reason for Admission: Septic shock ([facility] [address] [A41.9, R65.21] ? Perforated viscus [R19.8] ? Hospital day: 7 
? Problem List:  
   
Hospital Problems  Date Reviewed: 2/28/2020 Codes Class Noted POA Perforated viscus ICD-10-CM: R19.8 ICD-9-CM: 799.89  2/29/2020 Unknown Septic shock (HCC) ICD-10-CM: A41.9, R65.21 ICD-9-CM: 038.9, 785.52, 995.92  2/29/2020 Unknown BACKGROUND:  
 Past Medical History:  
Past Medical History:  
Diagnosis Date  Abnormally low high density lipoprotein (HDL) cholesterol with hypertriglyceridemia Lipid profile (11/6/2016) showed , , HDL 38, LDL 37  Acute blood loss as cause of postoperative anemia 12/12/2019  Anxiety  Bipolar affective disorder ([address] 12/5/2012  Cellulitis of right forearm 05/04/2017  Chronic back pain  Chronic obstructive pulmonary disease (COPD) ([address] SOB, on [place] O2 Recent admission with psychosis  Chronic respiratory failure with hypoxia ([address] On home oxygen inhalation at 3 LPM via NC  
 Contusion of left elbow 5/4/2017  Depression  Diabetic neuropathy associated with type 2 diabetes mellitus ([address]  Diastolic dysfunction without heart failure Stable on diuretics  Falls frequently  Gastroesophageal reflux disease with hiatal hernia  Generalized osteoarthritis of multiple sites  Gout On Allopurinol  History of acute renal failure 5/31/2013  History of back injury   
 jumped out of second story window  History of fracture due to fall 12/11/2019  
 History of hepatitis C   
 treated  History of penicillin allergy  History of vitamin D deficiency 5/10/2017  Hypertensive heart disease without heart failure Better controlled  Hyperuricemia 5/26/2017  Hypothyroidism  Hypoxemia requiring supplemental oxygen 12/29/2014  Intravenous drug user 5/2/2017  Long term current use of aspirin  Memory difficulty  Menopause  Mixed connective tissue disease ([facility] [address] 5/10/2017  Obesity, Class I, BMI 30-34.9  Olecranon bursitis of right elbow 5/4/2017  Opioid dependence ([facility] [address] On Methadone  Recurrent genital herpes 5/31/2013  Right Achilles tendinitis 5/2/2017  Sarcoidosis  Sickle cell trait ([facility] [address]  Tobacco use disorder  Type 2 diabetes mellitus with diabetic neuropathy, with long-term current use of insulin ([facility] [address] HbA1c (12/11/2019) = 7.1  Urge urinary incontinence 5/10/2017  Venous insufficiency  Wears glasses Patient taking anticoagulants no ASSESSMENT:  
? Changes in Assessment Throughout Shift: remains as in 2000 assessment ? Patient has Central Line: yes Reasons if yes: access ? Patient has Renae Cath: yes Reasons if yes: I&O  
 
? Last Vitals: 
  
Vitals:  
 03/07/20 0600 03/07/20 0608 03/07/20 0630 03/07/20 0700 BP: 120/59  127/61 125/61 Pulse: 74  78 74 Resp: 17  20 18 Temp:      
SpO2: 97%  98% 97% Weight:  90.4 kg (199 lb 4.7 oz) Height:      
LMP: 11/25/2012  
 
 
? IV and DRAINS (will only show if present) Vacuum Assisted Closure Anterior;Mid Abdominal-Site Assessment: Clean, dry, & intact Peripheral IV 02/29/20 Right;Upper Arm-Site Assessment: Clean, dry, & intact Nasogastric Tube 02/29/20-Site Assessment: Clean, dry, & intact Airway - Continuous Aspiration of Subglottic Secretions (TOLU) Tube 02/29/20 Oral-Site Assessment: Clean, dry, & intact Triple Lumen IJ Central Line 02/29/20 Right Internal jugular-Site Assessment: Clean, dry, & intact [REMOVED] Omer-Mcgee Drain 02/29/20 Mid Abdomen-Site Assessment: Clean, dry, & intact [REMOVED] Arterial Line 02/29/20 Left Radial artery-Site Assessment: Clean, dry, & intact ? WOUND (if present) Wound Type:  none,    Abdomen -OR Dressing present Dressing Present : Yes Wound Concerns/Notes:  Continue to rotate ? PAIN Pain Assessment Pain Intensity 1: 0 (03/06/20 1600) Pain Location 1: Abdomen Patient Stated Pain Goal: Unable to verbalize/indicate pain 
o Interventions for Pain:  none 
o Intervention effective: . o Time of last intervention: .  
o Reassessment Completed: . ? Last 3 Weights: 
Last 3 Recorded Weights in this Encounter 03/04/20 0530 03/06/20 0604 03/07/20 0219 Weight: 88.1 kg (194 lb 3.6 oz) 90.1 kg (198 lb 10.2 oz) 90.4 kg (199 lb 4.7 oz) Weight change: 0.3 kg (10.6 oz) ? INTAKE/OUPUT Current Shift: No intake/output data recorded. Last three shifts: 03/05 1901 - 03/07 0700 In: 3833.5 [I.V.:2448.5] Out: 8268 [Urine:2345; Drains:850] ? LAB RESULTS Recent Labs 03/07/20 
0500 03/06/20 
0549 03/05/20 
0500 WBC 9.2 9.9 13.4* HGB 7.5* 8.4* 8.4* HCT 23.4* 26.3* 25.5* PLT 83* 80* 77* Recent Labs 03/07/20 
0500 03/06/20 
0549 03/05/20 
1825 * 147* 150*  
K 2.9* 3.5 3.4*  
* 214* 194* BUN 16 22* 24* CREA 0.66 0.69 0.82 CA 8.0* 7.8* 8.1*  
MG 2.1 1.8 2.0  
 
 
RECOMMENDATIONS AND DISCHARGE PLANNING 1. Pending tests/procedures/ Plan of Care or Other Needs: successful removal from vent 2. Discharge plan for patient and Needs/Barriers: home 3. Estimated Discharge Date: pending Posted on Whiteboard in [address] [place] [address] Room: . 4. The patient's care plan was reviewed with the oncoming nurse. \"HEALS\" SAFETY CHECK Fall Risk Total Score: 3 Safety Measures: Safety Measures: Bed/Chair-Wheels locked, Bed in low position(unable to use call system) A safety check occurred in the patient's room between off going nurse and oncoming nurse listed above. The safety check included the below items Area Items H High Alert Medications ? Verify all high alert medication drips (heparin, PCA, etc.) E Equipment ? Suction is set up for ALL patients (with lashay) ? Red plugs utilized for all equipment (IV pumps, etc.) ? WOWs wiped down at end of shift. ? Room stocked with oxygen, suction, and other unit-specific supplies A Alarms ? Bed alarm is set for fall risk patients ? Ensure chair alarm is in place and activated if patient is up in a chair L Lines ? Check IV for any infiltration ? Renae bag is empty if patient has a Renae ? Tubing and IV bags are labeled [facility] Ramon Safety ? Room is clean, patient is clean, and equipment is clean. ? Hallways are clear from equipment besides carts. ? Fall bracelet on for fall risk patients ? Ensure room is clear and free of clutter ? Suction is set up for ALL patients (with lashay) ? Hallways are clear from equipment besides carts. ? Isolation precautions followed, supplies available outside room, sign posted Blane Flores RN

## 2020-03-07 NOTE — PROGRESS NOTES
NUTRITION Nutrition Screen RECOMMENDATIONS / PLAN:  
 
- Continue to advance tube feeding of Vital AF 1.2 as tolerated by 10 mL q 12 hours to goal rate of 40 mL/hr with Prosource TID, daily multivitamin and 100 mL q 4 hour water flushes. Monitor residuals every 4 hours and hold feedings for volume over 500 mL. - Monitor feeding tolerance. - Continue RD inpatient monitoring and evaluation. Goal Enteral Regimen: Vital AF at 40 mL/hr + Prosource TID + 100 mL q 4 hour water flushes to provide: 1332 kcal, 117 gm protein, 52 gm fat, 106 gm CHO, 5 gm fiber, 779 mL free water, 1379 mL total free water, 81% RDIs NUTRITION INTERVENTIONS & DIAGNOSIS:  
 
- Enteral nutrition: continue to advance - Vitamin and mineral supplement therapy: multivitamin - Nutrition related medication management: metoclopramide, miralax (held) Nutrition Diagnosis: Inadequate oral intake related to respiratory status, altered GI function as evidenced by pt NPO. Altered GI function related to chronic constipation, narcotic use and critically ill as evidenced by sigmoid colon perforation s/p sigmoid colectomy with postop impaired gut motility. ASSESSMENT:  
 
3/7: Tolerating feeds at 30 mL/hr, residual  mL. Receiving metoclopramide & miralax held this morning after large loose stool filled colostomy bag. 
 
3/6: Feeds advanced to 20 mL/hr then rate dropped back down to 10 mL/hr after 200 mL residual. Pt with chronic constipation, hx of narcotic use, partial paralysis and prolonged ileus anticipated per MD. Lantus increased for hyperglycemia, hypernatremia improving. 3/5: Tolerating trickle feeds, hypernatremia noted. 3/4: Minimal out from colostomy with 1100 mL out from NGT- will start trial of trickle feeding and monitor. 3/3: Remains intubated, no nausea/vomiting, no ostomy function.  No output documented from NGT.  
3/2: S/p ex lap, descending colectomy and creation of colostomy on 3/1 then re-ex lap, drainage of intra-abdominal stool collection, descending colectomy with mobilization of the splenic flexure and transverse colostomy. Remains intubated on the vent postop. Nutritional intake adequate to meet patients estimated nutritional needs:  No  
 
Tube Feeding: Vital AF 1.2 at 30 mL/hr via NGT Water Flushes: 100 mL q 4 hours Residuals:  mL Diet: DIET NUTRITIONAL SUPPLEMENTS Breakfast, Lunch, Dinner; [employer] 
DIET TUBE FEEDING Please monitor gastric residual every 4 hours and hold for volume over 500 mL. Food Allergies: NKFA Current Appetite: Not Applicable - NPO Appetite/meal intake prior to admission: Unable to determine at this time Feeding Limitations:  [] Swallowing difficulty    [] Chewing difficulty    [x] Other: respiratory status Current Meal Intake: No data found. BM: 3/7; colostomy (1100 output x 24 hours) Skin Integrity: abdominal surgical incision with wound VAC; MOHINDER drain Edema:   [] No     [x] Yes Pertinent Medications: Reviewed: SSI, steroid, 16 units lantus, pantoprazole, levothyroxine IV, norepinephrine, propofol- off, miralax, 2 gm Mg, 40 mEq K Bicarb, 40 mEq KCL Recent Labs 03/07/20 
0500 03/06/20 
0549 03/05/20 
1825 * 147* 150*  
K 2.9* 3.5 3.4*  
* 111 113* CO2 31 30 32 * 214* 194* BUN 16 22* 24* CREA 0.66 0.69 0.82 CA 8.0* 7.8* 8.1*  
MG 2.1 1.8 2.0 PHOS 3.1 2.8 1.9* ALB 1.7* 1.5* 1.5* SGOT 33 41* 60* ALT 17 19 17 Intake/Output Summary (Last 24 hours) at 3/7/2020 1140 Last data filed at 3/7/2020 1000 Gross per 24 hour Intake 1912.01 ml Output 2575 ml Net -662.99 ml Anthropometrics: 
Ht Readings from Last 1 Encounters:  
03/03/20 5' 4\" (1.626 m) Last 3 Recorded Weights in this Encounter 03/04/20 0530 03/06/20 0604 03/07/20 5070 Weight: 88.1 kg (194 lb 3.6 oz) 90.1 kg (198 lb 10.2 oz) 90.4 kg (199 lb 4.7 oz) Body mass index is 34.21 kg/m².   Obese, Class I  
 
 Weight History: previously reported intentional weight loss and previous usual weight of 230 lb Weight Metrics 3/7/2020 2/28/2020 1/22/2020 1/17/2020 1/2/2020 12/16/2019 12/14/2019 Weight 199 lb 4.7 oz - 171 lb 11.2 oz - 172 lb 6.4 oz - 178 lb 9.6 oz BMI 34.21 kg/m2 28.57 kg/m2 - 28.57 kg/m2 - 28.69 kg/m2 - Some encounter information is confidential and restricted. Go to Review Flowsheets activity to see all data. Admitting Diagnosis: Septic shock ([facility] [address] [A41.9, R65.21] Perforated viscus [R19.8] Pertinent PMHx: COPD, HTN, DM, hepatitis C, sarcoidosis, methadone use, GERD with hiatal hernia, hypothyroidism Procedures: Spine Thoracic Laminectomy T6-T11 with Fusion (12/11/20) 2/2 compression fx of T9-T10 and spinal cord compression at T9 and T10 and spinal cord edema with subsequent paraparesis Education Needs:        [x] None identified  [] Identified - Not appropriate at this time  []  Identified and addressed - refer to education log Learning Limitations:   [] None identified  [x] Identified: intubated Cultural, Orthodox & ethnic food preferences:  [x] None identified    [] Identified and addressed ESTIMATED NUTRITION NEEDS:  
 
Calories: 946-1204 kcal (11-14 kcal/kg) based on  [x] Actual BW 86 kg     [] IBW Protein: 110-138 gm (2-2.5 gm/kg) based on  [] Actual BW      [x] IBW 55 kg Fluid: 1 mL/kcal 
  
MONITORING & EVALUATION:  
  
Nutrition Goal(s):  
- Nutritional needs will be met through adequate oral intake or nutrition support within the next 7 days. Outcome: Progressing towards goal  
 
Monitoring:   [x] Food and nutrient intake   [x] Food and nutrient administration  [x] Comparative standards   [x] Nutrition-focused physical findings   [x] Anthropometric Measurements   [x] Treatment/therapy   [x] Biochemical data, medical tests, and procedures Previous Recommendations (for follow-up assessments only): Implemented Discharge Planning: Nutritional discharge needs unknown at this time. Participated in care planning, discharge planning, & interdisciplinary rounds as appropriate. Christian Bravo RD Pager: [phone]

## 2020-03-07 NOTE — PROGRESS NOTES
[facility]. Pulmonary, Critical Care, and Sleep Medicine Name: Zohra Miguelzkowskiej 17 MRN: [id number] : 1955 Hospital: [address] [place] [address] Dr Date: 3/7/2020  Admission Date: 2020 Chart and notes reviewed. Data reviewed. I have evaluated all findings. [x]I have reviewed the flowsheet and previous days notes. [x]The patient is unable to give any meaningful history or review of systems because the patient is: 
[x]Intubated [x]Sedated  
[]Unresponsive [x]The patient is critically ill on     
[x]Mechanical ventilation [x]Pressors []BiPAP [] Interval HPI: Patient is a 62 y. o. female with PMH of COPD (on 3LPM NC home O2), Sarcoidosis, HTN, T2DM,  Hep C, recent Spine Thoracic Laminectomy T6-T11 with Fusion (20) 2/2 compression fx of T9-T10 and spinal cord compression at T9 and T10 and spinal cord edema with subsequent paraparesis, who presented to [facility] ED on 20 c/o acute abdominal pain, SOB with associated constipation x4-5 days PTA, likely 2/2 aforementioned spinal surgery on 19. CT Chest/Abd/Pelvis showed intraperitoneal free air consistent with perforated viscus. Required intubation, Underwent emergent ex lap , was found to have sigmoid colon perforation, 2/2 constipation w/ large stool burden spilling into the peritoneum per Dr. Yemi Green. Dr. Yemi Green took pt back to OR on 3/1 for abdominal washout and closure. ICU stay complicated by persistent vasopressor requirement and afibb. Subjective 20 Hospital Day: 7 Vent Day:7 
POD ex lap: 6 POD washout, closure: 5 Overnight events: none Mentation/Activity: sedated Respiratory/ Secretions: stable Hemodynamics: off vasopressors Urine output, bowel: adequate Diet: Trickle feeds Need for procedures: n/a 
           
ROS:Review of systems not obtained due to patient factors. Events and notes from last 24 hours reviewed. Care plan discussed on multidisciplinary rounds. Patient Active Problem List  
Diagnosis Code  Diabetic neuropathy associated with type 2 diabetes mellitus ([facility]) E11.40  Venous insufficiency I87.2  Obesity, Class I, BMI 30-34.9 E66.9  Chronic back pain M54.9, G89.29  
 Generalized osteoarthritis of multiple sites M15.9  Bipolar affective disorder ([facility] F31.9  Abnormally low high density lipoprotein (HDL) cholesterol with hypertriglyceridemia E78.6, E78.1  Diastolic dysfunction without heart failure I51.89  Chronic obstructive pulmonary disease (COPD) ([facility] J44.9  Hypertensive heart disease without heart failure I11.9  Depression F32.9  History of back injury Z87.828  Type 2 diabetes mellitus with diabetic neuropathy, with long-term current use of insulin ([facility]) E11.40, Z79.4  Anxiety F41.9  Wears glasses Z97.3  History of hepatitis C Z86.19  
 Sickle cell trait ([facility]) D57.3  History of acute renal failure Z87.448  Hypothyroidism E03.9  Recurrent genital herpes A60.00  Sarcoidosis D86.9  Abscess of right arm L02.413  Hypoxemia requiring supplemental oxygen R09.02, Z99.81  
 Abnormal nuclear stress test R94.39  
 Cellulitis and abscess of hand L03.119, L02.519  
 Cellulitis and abscess of foot L03.119, L02.619  
 Cellulitis of arm, right L03. 113  
 Olecranon bursitis of right elbow M70.21  
 Contusion of left elbow S50. 02XA  Intravenous drug user F19.90  Right Achilles tendinitis M76.61  
 History of penicillin allergy Z88.0  Falls frequently R29.6  Gastroesophageal reflux disease with hiatal hernia K21.9, K44.9  Memory difficulty R41.3  Mixed connective tissue disease ([facility] M35.1  Impaired mobility and ADLs Z74.09  
 Hyperuricemia E79.0  History of vitamin D deficiency Z86.39  
 Urge urinary incontinence N39.41  Spinal cord compression ([facility]) G95.20  Compression fracture of body of thoracic vertebra ([facility]) S22.000A  Status post laminectomy with spinal fusion Z98.1  Acute blood loss as cause of postoperative anemia D62  
 History of fracture due to fall Z87.81  Chronic respiratory failure with hypoxia (HCC) J96.11  
 Cellulitis of right forearm L03. 113  
 Gout M10.9  Menopause Z78.0  Long term current use of aspirin Z79.82  
 Opioid dependence (HCC) F11.20  Tobacco use disorder F17.200  Sepsis ([address] A41.9  Perforated viscus R19.8  Septic shock ([facility]) A41.9, R65.21 Vital Signs: 
Visit Vitals /63 Pulse 86 Temp 99.5 °F (37.5 °C) Resp 20 Ht 5' 4\" (1.626 m) Wt 90.4 kg (199 lb 4.7 oz) LMP 2012 (Exact Date) SpO2 96% BMI 34.21 kg/m² O2 Device: Ventilator Temp (24hrs), Av.7 °F (37.6 °C), Min:98.9 °F (37.2 °C), Max:100.9 °F (38.3 °C) Intake/Output:  
Last shift:      No intake/output data recorded. Last 3 shifts:  1901 -  0700 In: 3833.5 [I.V.:2448.5] Out: 9103 [Urine:2345; Drains:850] Intake/Output Summary (Last 24 hours) at 3/7/2020 0703 Last data filed at 3/7/2020 1311 Gross per 24 hour Intake 1829.93 ml Output 3025 ml Net -1195.07 ml Ventilator Settings: 
Ventilator Mode: VC+, Assist control Respiratory Rate Resp Rate Observed: 24 Back-Up Rate: 16 Insp Time (sec): 1 sec Insp Flow (l/min): 49 l/min I:E Ratio: 1;2 
Ventilator Volumes Vt Set (ml): 450 ml Vt Exhaled (Machine Breath) (ml): 471 ml Vt Spont (ml): 438 ml Ve Observed (l/min): 9 l/min Ventilator Pressures Pressure Support (cm H2O): 7 cm H2O 
PIP Observed (cm H2O): 17 cm H2O Plateau Pressure (cm H2O): 15 cm H2O 
MAP (cm H2O): 9 PEEP/VENT (cm H2O): 5 cm H20 Auto PEEP Observed (cm H2O): 0 cm H2O Current Facility-Administered Medications Medication Dose Route Frequency  levothyroxine (SYNTHROID) injection 75 mcg  75 mcg IntraVENous DAILY  midodrine (PROAMITINE) tablet 5 mg  5 mg Oral TID WITH MEALS  
  metoclopramide HCl (REGLAN) tablet 10 mg  10 mg Oral TIDAC  insulin glargine (LANTUS) injection 16 Units  16 Units SubCUTAneous DAILY  polyethylene glycol (MIRALAX) packet 17 g  17 g Oral DAILY  hydrocortisone Sod Succ (PF) (SOLU-CORTEF) injection 50 mg  50 mg IntraVENous H35M  
 multivit-folic acid-herbal 411 (WELLESSE PLUS) oral liquid 30 mL  30 mL Per NG tube DAILY  fluconazole (DIFLUCAN) 400mg/200 mL IVPB (premix)  400 mg IntraVENous Q24H  piperacillin-tazobactam (ZOSYN) 4.5 g in 0.9% sodium chloride (MBP/ADV) 100 mL MBP  4.5 g IntraVENous Q6H  
 propofol (DIPRIVAN) 10 mg/mL infusion  0-50 mcg/kg/min IntraVENous TITRATE  amiodarone (NEXTERONE) 360 mg in dextrose 200 mL (1.8 mg/mL) infusion  0.5-1 mg/min IntraVENous TITRATE  chlorhexidine (PERIDEX) 0.12 % mouthwash 10 mL  10 mL Oral Q12H  pantoprazole (PROTONIX) 40 mg in 0.9% sodium chloride 10 mL injection  40 mg IntraVENous Q12H  
 fentaNYL (PF) 900 mcg/30 ml infusion soln  0-200 mcg/hr IntraVENous TITRATE  albuterol-ipratropium (DUO-NEB) 2.5 MG-0.5 MG/3 ML  3 mL Nebulization Q4H RT  
 budesonide (PULMICORT) 500 mcg/2 ml nebulizer suspension  500 mcg Nebulization BID RT  
 insulin lispro (HUMALOG) injection   SubCUTAneous Q6H  
 NOREPINephrine (LEVOPHED) 32 mg in 5% dextrose 250 mL infusion  0.5-30 mcg/min IntraVENous TITRATE Telemetry: []Sinus []A-flutter []Paced [x]A-fib []Multiple PVCs Physical Exam:  
  
General: Intubated/sedated; HEENT:  Anicteric sclerae; pink palpebral conjunctivae; mucosa moist 
Resp:  Symmetrical chest expansion, no accessory muscle use; good airway entry; no rales/ wheezing/ rhonchi noted CV:  S1, S2 present; regular rate and rhythm GI:  Abdomen soft, non-tender; (+) active bowel sounds, colostomy present, wound vac Extremities:  +2 pulses on all extremities; anasarca, b/l soft wrist restraints Skin:  Warm; no rashes/ lesions noted, normal turgor/cap refill Neurologic:  Non-focal 
Devices:  Colostomy, ogt, ett, CVL 
 
 
DATA: 
MAR reviewed and pertinent medications noted or modified as needed Labs: 
Recent Labs 03/07/20 
0500 03/06/20 
0549 03/05/20 
0500 WBC 9.2 9.9 13.4* HGB 7.5* 8.4* 8.4* HCT 23.4* 26.3* 25.5* PLT 83* 80* 77* Recent Labs 03/06/20 
5903 03/05/20 
1825 03/05/20 
9221 * 150* 151*  
K 3.5 3.4* 3.3*  
 113* 112* CO2 30 32 31 * 194* 231* BUN 22* 24* 25* CREA 0.69 0.82 1.04  
CA 7.8* 8.1* 7.9*  
MG 1.8 2.0 2.0 PHOS 2.8 1.9* 2.1* ALB 1.5* 1.5* 1.5* SGOT 41* 60* 20 ALT 19 17 14 No results for input(s): PH, PCO2, PO2, HCO3, FIO2 in the last 72 hours. Recent Labs 03/07/20 
0327 03/06/20 
7138 03/05/20 
5032 FIO2I 40 45 45 HCO3I 31.1* 32.1* 31.8* PCO2I 36.7 44.6 44.6 PHI 7.537* 7.465* 7.461* PO2I 68* 123* 95 Imaging: 
[x]   I have personally reviewed the patients radiographs and reports XR Results (most recent): 
[place] [facility] [place] on [place]29/20 XR CHEST PORT Narrative PORTABLE CHEST RADIOGRAPH  
 
CPT CODE: 28318 INDICATION: Intubated. Respiratory distress and oxygen desaturations with 
altered mental status COMPARISON: 3/5/2020. FINDINGS: 
 
Frontal view of the chest obtained at 0453 hours. Rotated exam. ET tube tip 2.7 
cm above alyssa. Feeding tube present, limited assessment. The cardiomediastinal 
silhouette is stable. Little change in appearance of bilateral mid and lower 
lung opacities and/or pleural fluid. No gross evidence of pneumothorax. Thoracic spinal fusion rods. Impression IMPRESSION: 
Support devices as above. No pneumothorax. Limited by patient rotation. Grossly stable bilateral lung process (edema versus 
infection), likely with layering pleural effusions. CT Results (most recent): 
Results from [facility] [place] encounter on 02/29/20 CT CHEST ABD PELV W CONT Narrative CT SCAN OF THE CHEST, ABDOMEN AND PELVIS, WITH CONTRAST INDICATION: Above. Arrived with shortness of breath and abdominal pain. Septic 
shock. Hypoxia. History of sarcoidosis/COPD. Altered. Abdominal distention and 
tympani. Intraperitoneal free air on chest radiograph. Perforated viscus. COMPARISON: Chest CT 1/21/2010. No prior abdominopelvic CT in PACS. Report is 
noted in the electronic medical record (care everywhere) of previous noncontrast 
enhanced abdominopelvic CT performed elsewhere 7/21/2014. TECHNIQUE: With IV administration of 70 mL Isovue-300, without administration of 
oral contrast, helically acquired serial axial CT images through the chest, 
abdomen and pelvis were obtained. Additional coronal and sagittal reformation 
images were also performed. All CT scans at this facility are performed using dose optimization technique as 
appropriate to the performed exam, to include automated exposure control, 
adjustment of the mA and/or kV according to patient's size (Including 
appropriate matching for site-specific examinations), or use of iterative 
reconstruction technique. FINDINGS: 
 
CT chest:  
 
Endotracheal tube tip is in place cephalad to the level of the alyssa. Esophagogastric tube is noted, tip in the mid stomach. Severe pulmonary emphysematous disease again seen with extensive bullous changes 
most conspicuous in the upper lungs. Scattered scarring. There has been interval 
development of consolidative appearing lung opacities in the lower lobes 
bilaterally consistent with airspace disease likely in addition to atelectasis. The small stable subpleural pulmonary nodule described in the right lower lobe 
on the recent chest CT is again seen, slightly better visualized on the current 
study measuring approximately 4 mm, unchanged compared to the CT of 2/6/2018 
(axial 34). Multifocal chronic nodular pleuro-parenchymal scarring in the left upper lobe without significant interval change compared to the preceding chest 
CT or study of 2/6/2018. No definite suspicious new pulmonary nodule/mass identified compared to the 
preceding CT. No evidence of pathologic lymph node enlargement is seen. No evidence of pleural or significant pericardial effusion. CT abdomen/pelvis:  
 
Small ascites, best seen around the liver and in the anterior pelvis adjacent to 
the distal descending and sigmoid colon. The spleen is heterogeneous in attenuation and demonstrates several rounded 
hypodensities as detailed on the recent chest CT of 1/21/2020. Indeterminate attenuation masses in the kidneys bilaterally, 2.7 cm right kidney 
upper to midpole laterally, 2.1 cm left kidney interpolar region posteriorly. Further evaluation recommended, beginning with ultrasound might be helpful when 
clinically feasible if the corresponding lesions can be identified 
sonographically. Additional smaller subcentimeter renal hypodensities 
bilaterally, possibly cysts, too small to be specifically characterized. The liver, gallbladder, pancreas, and adrenal glands appear unremarkable. Scattered calcifications in the abdominal aorta and its major branches. The 
abdominal aorta enhances normally without abdominal aortic aneurysm. A few scattered small subcentimeter short axis lymph nodes bilaterally in the 
pelvis or retroperitoneum. No evidence of pathologic lymph node enlargement is 
seen. Moderate quantity of intraperitoneal free air in the nondependent anterior 
abdomen, consistent with perforated viscus. Additional scattered foci of free 
air elsewhere in the peritoneum and mesentery, including in the right upper 
quadrant near the gallbladder/duodenum, and scattered bilaterally in the 
mesenteric/peritoneal fat of the upper and lower abdomen.  Grouped small foci of 
intraperitoneal free air are noted close to the colonic wall along the 
 left/anterior side of the sigmoid colon (reference axial 112-119). The left 
hemicolon is diffusely mildly distended to the rectum containing gas, stool, and 
hyperdense material. Clinical correlation might be helpful regarding recent 
ingestion of hyperdense material. The rectum measures approximately 8.5 cm in 
caliber. The sigmoid measures approximately 7.3 cm in caliber on axial image 115. The site of the or free viscus is uncertain. Common sites could include 
duodenal or gastric perforation such as may be seen due to perforated ulcer. However, there is small focal hypoattenuation along the wall of the sigmoid 
colon on series 2 axial images 111-113 which could potentially reflect a small 
mural defect such as could be seen related to constipation, stercoral colitis, 
diverticular disease. The appendix is seen within the ascites in the right lower quadrant, assessment 
for stranding in the periappendiceal fat limited by the ascites, without gross 
abnormal thickening of the appendix seen. No gas within the appendix. The right 
hemicolon appears grossly unremarkable. There is mild diffuse mural thickening of small bowel loops and mild prominence 
of enhancement, nonspecific. No definite associated pneumatosis. No portal 
venous gas is seen. The SMA/SMV appear to maintain usual orientation. Broadly 
speaking differential considerations could include inflammatory, infectious, 
ischemic. Clinical correlation is recommended including with lactate 
measurement. Conceivably sequela of peritonitis in the setting of bowel 
perforation? Uterus is present. Small gas is noted within the uterus and vagina, nonspecific, 
can be seen as a physiologic finding. On review of bone windows, there is a superior endplate compression fracture 
with mildly to moderately decreased vertebral body height at L2. Bilateral 
spinal fusion rods spanning from Q3-Q2-W48-T12. Severe compression fracture deformity again seen at T9. Compression fracture with rather pronounced 
decreased vertebral body height again seen at T5. Mild endplate indentations at 
several other levels including T4, T6, T10. The posterior midline fluid 
collection described on the preceding study was better visualized on the 
preceding study, extensive metallic hardware artifacts noted. Impression Impression: Moderate quantity of intraperitoneal free air. Findings are consistent with 
perforated viscus. The site of perforation is uncertain, as discussed above, 
possibly the sigmoid colon where there is apparent small subtle focal 
hypoattenuation along the wall as detailed above (reference axial images 111-113) close to region where several foci of free air are grouped. The left 
hemicolon is mildly distended and filled with hyperdense appearing stool. Mild diffuse mural thickening and enhancement of the small bowel as described. Small volume of ascites best seen around the liver and in the pelvis adjacent to 
the sigmoid. Bibasilar consolidations, right greater than left, suspicious for airspace 
disease/pneumonia. Indeterminate attenuation bilateral renal masses, follow-up evaluation 
recommended. Splenic hypodensities again seen as described on recent chest CT. Intubated. Severe pulmonary emphysematous disease and fibrotic changes. Multiple otherwise nonacute findings as detailed above. I have discussed these results directly with Dr. Nupur Oreilly in the ED at 12:20 p.m. 
in addition to the patient's consulted surgeon at 12:25 PM. IMPRESSION:  
· Acute on Chronic Respiratory Failure - Emergently intubated in ED for airway protection, 2/2 below. Now post-op and remains on mechanical ventilation. Chronically on 3L NC at home.   
· Acute Sigmoid Colon Perforation w/ peritonitis - s/p emergent ex-lap w/ drainage of intraabdominal stool and collections; sigmoid colectomy with Dr. Heather Jennings on 02/29/20. Subsequent washout and abdominal closure on 03/01/20. · Staph bacteremia - Grew on BCx collected 3/3. GNR bacteremia on 2/29, no regrowth · Hypokalemia - Likely 2/2 diuresis. · Hypophosphatemia- ?refeeding syndrome- resolved · Metabolic alkalosis ?diuresis / GI loss · Intermittent Tachyarrhythmia - afibb/flutter - improved · Septic Shock due to peritonitis, UTI · Bilateral PNA - Sputum Cx collected 3/1 grew Moraxella catarrhalis. · Troponemia - demand ischemia, not an MI.  
· Hypothyroidism · ELIF - Likely pre-renal, 2/2 above. Initial ELIF resolved. Pt w/ mild increase in BUN/Cr w/ diuresis. · UTI - UCx grew E.coli. · T2DM  
· Paraparesis, in wheelchair: Recent Hx of Spine Thoracic Laminectomy T6-T11 with Fusion (12/11/20) with Dr. Geraldine Dixon 2/2 Acute compression fx of T9-T10 and spinal cord compression at T9 and T10 and spinal cord edema with subsequent paraparesis. · Hx of Sarcoidosis - Chronic steroid use. · Hx of COPD, on chronic 3L NC, FEV1 51% in 2012, unable to perform repeat studies since  - Follows with Dr. Manolo Spain in clinic. · Hx of Cocaine and Heroin Abuse - on methadone. Patient Active Problem List  
Diagnosis Code  Diabetic neuropathy associated with type 2 diabetes mellitus ([facility]) E11.40  Venous insufficiency I87.2  Obesity, Class I, BMI 30-34.9 E66.9  Chronic back pain M54.9, G89.29  
 Generalized osteoarthritis of multiple sites M15.9  Bipolar affective disorder ([facility] F31.9  Abnormally low high density lipoprotein (HDL) cholesterol with hypertriglyceridemia E78.6, E78.1  Diastolic dysfunction without heart failure I51.89  Chronic obstructive pulmonary disease (COPD) ([facility] J44.9  Hypertensive heart disease without heart failure I11.9  Depression F32.9  History of back injury Z87.828  Type 2 diabetes mellitus with diabetic neuropathy, with long-term current use of insulin ([facility]) E11.40, Z79.4  Anxiety F41.9  Wears glasses Z97.3  History of hepatitis C Z86.19  
 Sickle cell trait (HCC) D57.3  History of acute renal failure Z87.448  Hypothyroidism E03.9  Recurrent genital herpes A60.00  Sarcoidosis D86.9  Abscess of right arm L02.413  Hypoxemia requiring supplemental oxygen R09.02, Z99.81  
 Abnormal nuclear stress test R94.39  
 Cellulitis and abscess of hand L03.119, L02.519  
 Cellulitis and abscess of foot L03.119, L02.619  
 Cellulitis of arm, right L03. 113  
 Olecranon bursitis of right elbow M70.21  
 Contusion of left elbow S50. 02XA  Intravenous drug user F19.90  Right Achilles tendinitis M76.61  
 History of penicillin allergy Z88.0  Falls frequently R29.6  Gastroesophageal reflux disease with hiatal hernia K21.9, K44.9  Memory difficulty R41.3  Mixed connective tissue disease ([facility] M35.1  Impaired mobility and ADLs Z74.09  
 Hyperuricemia E79.0  History of vitamin D deficiency Z86.39  
 Urge urinary incontinence N39.41  Spinal cord compression ([facility]) G95.20  Compression fracture of body of thoracic vertebra ([facility]) S22.000A  Status post laminectomy with spinal fusion Z98.1  Acute blood loss as cause of postoperative anemia D62  
 History of fracture due to fall Z87.81  Chronic respiratory failure with hypoxia ([facility]) J96.11  
 Cellulitis of right forearm L03. 113  
 Gout M10.9  Menopause Z78.0  Long term current use of aspirin Z79.82  
 Opioid dependence ([facility]) F11.20  Tobacco use disorder F17.200  Sepsis ([facility] A41.9  Perforated viscus R19.8  Septic shock ([facility]) A41.9, R65.21  
 
  
RECOMMENDATIONS:  
Neuro:Titrate sedation for RASS goal 0 to -1, daily sedation holiday. Bilateral wrist restraints Pulm: Aspiration precautions, HOB>30'. VAP Bundle. Cont' scheduled nebs. CVS:Monitor HD, titrate levophed for MAP goal >65. Cont' amio gtt, cont' cardizem. Cards following. GI: NPO. Tube feed- advance per surgery. Monitor for refeeding. Renal: Trend Cr, UOP. Rousseau present. Possibly diamox today for alkalosis Hem/Onc: Trend H/H, monitor for s/o active bleeding. Daily CBC. Cont' solu-cortef I/D: Blood cultures. Antibiotics include zosyn, diflucan. ID following, afebrile overnight. Trend WBCs and temperature curve. Metabolic: V61 BMP, mag, phos. Trend lytes and replace per protocol. Endocrine:Q6 glucoses. SSI, lantus. Avoid hypoglycemia. IV synthroid Musc/Skin: Wound VAC per surgery Full Code Discussed in interdisciplinary rounds Best practice : 
 
Glycemic control IHI ICU bundles: 
 Central Line Bundle Followed , Rousseau Bundle Followed and Vent Bundle Followed, Vent Day 7 [facility] Vent patients- VAP bundle, aim to keep peak plateau pressure 62-38KR H2O Sress ulcer prophylaxis. DVT prophylaxis. Need for Lines, rousseau assessed. Restraints need. Palliative care evaluation. High complexity decision making was performed during this consultation and evaluation. [x]       Pt is at high risk for further organ failure and dysfunction. Fay Mullins PA-C 
03/07/20 Pulmonary, Critical Care Medicine [facility]

## 2020-03-07 NOTE — PROGRESS NOTES
Bedside and Verbal shift change report given to Jose (oncoming nurse) by Silke Manjarrez (offgoing nurse). Report included the following information SBAR, Kardex and MAR.

## 2020-03-07 NOTE — PROGRESS NOTES
Inpatient Daily Progress Note Subjective:  
Vanzola A Roots  Pain is well controlled. has not had nausea has not vomiting has passed flatus has had a bowel movement Objective:  
 
 
Physical Exam: 
Visit Vitals /61 Pulse 74 Temp 99.6 °F (37.6 °C) Resp 18 Ht 5' 4\" (1.626 m) Wt 90.4 kg (199 lb 4.7 oz) LMP 11/25/2012 (Exact Date) SpO2 96% BMI 34.21 kg/m² Gen: Well developed, well nourished 62 y.o. female in no acute distress Resp: clear to auscultation bilaterally, no wheezes rhonchi or rales, excursions normal and symmetrical 
Cardio: Regular rate and rhythm, no murmurs, clicks, gallops or rubs, no edema Abdomen: soft, nontender, nondistended, normoactive bowel sounds, no hernias Psych: alert and oriented to person, place and time Recent Results (from the past 12 hour(s)) POC G3 Collection Time: 03/07/20  3:27 AM  
Result Value Ref Range Device: VENT    
 FIO2 (POC) 40 % pH (POC) 7.537 (H) 7.35 - 7.45    
 pCO2 (POC) 36.7 35.0 - 45.0 MMHG  
 pO2 (POC) 68 (L) 80 - 100 MMHG  
 HCO3 (POC) 31.1 (H) 22 - 26 MMOL/L  
 sO2 (POC) 95 92 - 97 % Base excess (POC) 8 mmol/L Mode ASSIST CONTROL Tidal volume 450 ml Set Rate 16 bpm  
 PEEP/CPAP (POC) 5 cmH2O  
 PIP (POC) 17 Allens test (POC) YES Inspiratory Time 1 sec Total resp. rate 19 Site RIGHT RADIAL Specimen type (POC) ARTERIAL Performed by Gavin Preston Volume control plus YES    
MAGNESIUM Collection Time: 03/07/20  5:00 AM  
Result Value Ref Range Magnesium 2.1 1.6 - 2.6 mg/dL METABOLIC PANEL, COMPREHENSIVE Collection Time: 03/07/20  5:00 AM  
Result Value Ref Range Sodium 148 (H) 136 - 145 mmol/L Potassium 2.9 (LL) 3.5 - 5.5 mmol/L Chloride 112 (H) 100 - 111 mmol/L  
 CO2 31 21 - 32 mmol/L Anion gap 5 3.0 - 18 mmol/L Glucose 164 (H) 74 - 99 mg/dL BUN 16 7.0 - 18 MG/DL  Creatinine 0.66 0.6 - 1.3 MG/DL  
 BUN/Creatinine ratio 24 (H) 12 - 20    
 GFR est AA >60 >60 ml/min/1.73m2 GFR est non-AA >60 >60 ml/min/1.73m2 Calcium 8.0 (L) 8.5 - 10.1 MG/DL Bilirubin, total 0.5 0.2 - 1.0 MG/DL  
 ALT (SGPT) 17 13 - 56 U/L  
 AST (SGOT) 33 10 - 38 U/L Alk. phosphatase 61 45 - 117 U/L Protein, total 5.2 (L) 6.4 - 8.2 g/dL Albumin 1.7 (L) 3.4 - 5.0 g/dL Globulin 3.5 2.0 - 4.0 g/dL A-G Ratio 0.5 (L) 0.8 - 1.7 PHOSPHORUS Collection Time: 03/07/20  5:00 AM  
Result Value Ref Range Phosphorus 3.1 2.5 - 4.9 MG/DL  
CBC WITH AUTOMATED DIFF Collection Time: 03/07/20  5:00 AM  
Result Value Ref Range WBC 9.2 4.6 - 13.2 K/uL  
 RBC 2.78 (L) 4.20 - 5.30 M/uL HGB 7.5 (L) 12.0 - 16.0 g/dL HCT 23.4 (L) 35.0 - 45.0 % MCV 84.2 74.0 - 97.0 FL  
 MCH 27.0 24.0 - 34.0 PG  
 MCHC 32.1 31.0 - 37.0 g/dL  
 RDW 16.7 (H) 11.6 - 14.5 % PLATELET 83 (L) 519 - 420 K/uL MPV 11.7 9.2 - 11.8 FL  
 NEUTROPHILS 80 (H) 42 - 75 % BAND NEUTROPHILS 11 (H) 0 - 5 % LYMPHOCYTES 5 (L) 20 - 51 % MONOCYTES 3 2 - 9 % EOSINOPHILS 0 0 - 5 % BASOPHILS 0 0 - 3 % METAMYELOCYTES 1 (H) 0 %  
 ABS. NEUTROPHILS 8.4 (H) 1.8 - 8.0 K/UL  
 ABS. LYMPHOCYTES 0.5 (L) 0.8 - 3.5 K/UL  
 ABS. MONOCYTES 0.3 0 - 1.0 K/UL  
 ABS. EOSINOPHILS 0.0 0.0 - 0.4 K/UL  
 ABS. BASOPHILS 0.0 0.0 - 0.06 K/UL  
 DF MANUAL PLATELET COMMENTS DECREASED PLATELETS    
 RBC COMMENTS ANISOCYTOSIS 1+ 
    
 RBC COMMENTS POLYCHROMASIA 1+ 
    
 RBC COMMENTS TARGET CELLS 1+ GLUCOSE, POC Collection Time: 03/07/20  6:11 AM  
Result Value Ref Range Glucose (POC) 218 (H) 70 - 110 mg/dL GLUCOSE, POC Collection Time: 03/07/20 12:22 PM  
Result Value Ref Range Glucose (POC) 236 (H) 70 - 110 mg/dL Assessment:  
 
Carrie Nolan is a 62 y.o. female SP formation of colostomy with post op ileus Physical Exam:   
 
General:  in no apparent distress Eyes:  conjunctivae and sclerae normal, pupils equal, round, reactive to light Throat & Neck: no erythema or exudates noted and neck supple and symmetrical; no palpable masses Lungs:   clear to auscultation bilaterally Heart:  Regular rate and rhythm Abdomen:   Non distended. BS few,  A little drainage in ostomoy. Ostomy looks good ng output coming down Extremities: extremities normal, atraumatic, no cyanosis or edema Skin: Normal.  
   
Plan:  
 
-Continue current medications Pleased with extubation. Gi ileus persist but ng output coming down. Overall looks good.   
 
Rosa Isela Manning MD

## 2020-03-07 NOTE — PROGRESS NOTES
[facility]. Pulmonary, Critical Care, and Sleep Medicine Name: Alma Pedroza MRN: [id number] : 1955 Hospital: [address] [place] [address] Dr Date: 3/6/2020  Admission Date: 2020 Chart and notes reviewed. Data reviewed. I have evaluated all findings. [x]I have reviewed the flowsheet and previous days notes. []The patient is unable to give any meaningful history or review of systems because the patient is: 
[]Intubated []Sedated  
[]Unresponsive []The patient is critically ill on     
[]Mechanical ventilation []Pressors []BiPAP [] Interval HPI: 
 62year old female who under when bowel closure and colostomy surgery on  due to a perforated colon. Patient was admitted to the ICU for respiratory failure secondary to bowel perforation. Patient was on pressors but was taken off do to normotensive. Patient is currently on amiodarone for episodes of tachyarrhythmias, of which she has had none of during the night. Able to follow commands including squeezing fingers and tracking voices and movement. Cardiology following. Cardiology will not anticoagulate her due to recent abdominal surgery and thrombocytopenia. Started back on pressures on 3/5 evening with pressure do to 86'H systolic. Low TSH, started on levothyroxine. Subjective 20 Hospital Day: 7 Vent Day: 7 Post op day: 6 Overnight events: none Mentation/Activity: sedated but followed commands Respiratory/ Secretions:  
Hemodynamics: on pressures Urine output, bowel:  
Diet: tube feed Need for procedures: 
           
ROS:Review of systems not obtained due to patient factors. Events and notes from last 24 hours reviewed. Care plan discussed on multidisciplinary rounds. Patient Active Problem List  
Diagnosis Code  Diabetic neuropathy associated with type 2 diabetes mellitus (HCC) E11.40  Venous insufficiency I87.2  Obesity, Class I, BMI 30-34.9 E66.9  Chronic back pain M54.9, G89.29  
 Generalized osteoarthritis of multiple sites M15.9  Bipolar affective disorder ([facility] [address] F31.9  Abnormally low high density lipoprotein (HDL) cholesterol with hypertriglyceridemia E78.6, E78.1  Diastolic dysfunction without heart failure I51.89  Chronic obstructive pulmonary disease (COPD) ([facility] [address] J44.9  Hypertensive heart disease without heart failure I11.9  Depression F32.9  History of back injury Z87.828  Type 2 diabetes mellitus with diabetic neuropathy, with long-term current use of insulin ([facility]) E11.40, Z79.4  Anxiety F41.9  Wears glasses Z97.3  History of hepatitis C Z86.19  
 Sickle cell trait ([facility]) D57.3  History of acute renal failure Z87.448  Hypothyroidism E03.9  Recurrent genital herpes A60.00  Sarcoidosis D86.9  Abscess of right arm L02.413  Hypoxemia requiring supplemental oxygen R09.02, Z99.81  
 Abnormal nuclear stress test R94.39  
 Cellulitis and abscess of hand L03.119, L02.519  
 Cellulitis and abscess of foot L03.119, L02.619  
 Cellulitis of arm, right L03. 113  
 Olecranon bursitis of right elbow M70.21  
 Contusion of left elbow S50. 02XA  Intravenous drug user F19.90  Right Achilles tendinitis M76.61  
 History of penicillin allergy Z88.0  Falls frequently R29.6  Gastroesophageal reflux disease with hiatal hernia K21.9, K44.9  Memory difficulty R41.3  Mixed connective tissue disease ([facility] [address] M35.1  Impaired mobility and ADLs Z74.09  
 Hyperuricemia E79.0  History of vitamin D deficiency Z86.39  
 Urge urinary incontinence N39.41  Spinal cord compression ([facility]) G95.20  Compression fracture of body of thoracic vertebra ([facility]) S22.000A  Status post laminectomy with spinal fusion Z98.1  Acute blood loss as cause of postoperative anemia D62  
 History of fracture due to fall Z87.81  Chronic respiratory failure with hypoxia ([facility] [address] J96.11  
  Cellulitis of right forearm L03. 113  
 Gout M10.9  Menopause Z78.0  Long term current use of aspirin Z79.82  
 Opioid dependence (HCC) F11.20  Tobacco use disorder F17.200  Sepsis ([address] A41.9  Perforated viscus R19.8  Septic shock (HCC) A41.9, R65.21 Vital Signs: 
Visit Vitals /58 Pulse 77 Temp (!) 100.9 °F (38.3 °C) Comment: RN Berto notified Resp 28 Ht 5' 4\" (1.626 m) Wt 90.1 kg (198 lb 10.2 oz) LMP 2012 (Exact Date) SpO2 96% BMI 34.10 kg/m² O2 Device: Ventilator Temp (24hrs), Av.2 °F (37.3 °C), Min:97.3 °F (36.3 °C), Max:100.9 °F (38.3 °C) Intake/Output:  
Last shift:      No intake/output data recorded. Last 3 shifts:  07 -  1900 In: 4600 [I.V.:3005] Out: 5861 [VXUBL:8883; SCDVQT:871] Intake/Output Summary (Last 24 hours) at 3/6/2020 2254 Last data filed at 3/6/2020 1900 Gross per 24 hour Intake 2542.71 ml Output 1740 ml Net 802.71 ml Ventilator Settings: 
Ventilator Mode: VC+, Assist control Respiratory Rate Resp Rate Observed: 24 Back-Up Rate: 16 Insp Time (sec): 1 sec Insp Flow (l/min): 49 l/min I:E Ratio: 1;2 
Ventilator Volumes Vt Set (ml): 450 ml Vt Exhaled (Machine Breath) (ml): 481 ml Vt Spont (ml): 438 ml Ve Observed (l/min): 8.33 l/min Ventilator Pressures Pressure Support (cm H2O): 7 cm H2O 
PIP Observed (cm H2O): 13 cm H2O Plateau Pressure (cm H2O): 16 cm H2O 
MAP (cm H2O): 7.4 PEEP/VENT (cm H2O): 5 cm H20 Auto PEEP Observed (cm H2O): 0 cm H2O Current Facility-Administered Medications Medication Dose Route Frequency  levothyroxine (SYNTHROID) injection 75 mcg  75 mcg IntraVENous DAILY  midodrine (PROAMITINE) tablet 5 mg  5 mg Oral TID WITH MEALS  metoclopramide HCl (REGLAN) tablet 10 mg  10 mg Oral TIDAC  
 [START ON 3/7/2020] insulin glargine (LANTUS) injection 16 Units  16 Units SubCUTAneous DAILY  [START ON 3/7/2020] polyethylene glycol (MIRALAX) packet 17 g  17 g Oral DAILY  hydrocortisone Sod Succ (PF) (SOLU-CORTEF) injection 50 mg  50 mg IntraVENous S78Z  
 multivit-folic acid-herbal 589 (WELLESSE PLUS) oral liquid 30 mL  30 mL Per NG tube DAILY  fluconazole (DIFLUCAN) 400mg/200 mL IVPB (premix)  400 mg IntraVENous Q24H  piperacillin-tazobactam (ZOSYN) 4.5 g in 0.9% sodium chloride (MBP/ADV) 100 mL MBP  4.5 g IntraVENous Q6H  
 propofol (DIPRIVAN) 10 mg/mL infusion  0-50 mcg/kg/min IntraVENous TITRATE  amiodarone (NEXTERONE) 360 mg in dextrose 200 mL (1.8 mg/mL) infusion  0.5-1 mg/min IntraVENous TITRATE  chlorhexidine (PERIDEX) 0.12 % mouthwash 10 mL  10 mL Oral Q12H  pantoprazole (PROTONIX) 40 mg in 0.9% sodium chloride 10 mL injection  40 mg IntraVENous Q12H  
 fentaNYL (PF) 900 mcg/30 ml infusion soln  0-200 mcg/hr IntraVENous TITRATE  albuterol-ipratropium (DUO-NEB) 2.5 MG-0.5 MG/3 ML  3 mL Nebulization Q4H RT  
 budesonide (PULMICORT) 500 mcg/2 ml nebulizer suspension  500 mcg Nebulization BID RT  
 insulin lispro (HUMALOG) injection   SubCUTAneous Q6H  
 NOREPINephrine (LEVOPHED) 32 mg in 5% dextrose 250 mL infusion  0.5-30 mcg/min IntraVENous TITRATE Telemetry: []Sinus []A-flutter []Paced []A-fib []Multiple PVCs Physical Exam:  
  
General: Intubated/sedated; HEENT:  Anicteric sclerae; pink palpebral conjunctivae; mucosa moist 
Resp:  Symmetrical chest expansion, no accessory muscle use; good airway entry; no rales/ wheezing/ rhonchi noted CV:  S1, S2 present; regular rate and rhythm GI:  Abdomen soft, non-tender; (+) active bowel sounds Extremities:  +2 pulses on all extremities; no edema/ cyanosis/ clubbing noted. B/l wrist restraints Skin:  Warm; no rashes/ lesions noted, normal turgor/cap refill Neurologic:  Non-focal 
Devices:  No NGT/OGT, Central line/ PICC, ETT/tracheostomy, chest tube DATA: 
 MAR reviewed and pertinent medications noted or modified as needed Labs: 
Recent Labs 03/06/20 
0549 03/05/20 
0500 03/04/20 
0410 WBC 9.9 13.4* 23.9* HGB 8.4* 8.4* 9.0*  
HCT 26.3* 25.5* 28.1*  
PLT 80* 77* 98* Recent Labs 03/06/20 
3931 03/05/20 
1825 03/05/20 
1591 * 150* 151*  
K 3.5 3.4* 3.3*  
 113* 112* CO2 30 32 31 * 194* 231* BUN 22* 24* 25* CREA 0.69 0.82 1.04  
CA 7.8* 8.1* 7.9*  
MG 1.8 2.0 2.0 PHOS 2.8 1.9* 2.1* ALB 1.5* 1.5* 1.5* SGOT 41* 60* 20 ALT 19 17 14 No results for input(s): PH, PCO2, PO2, HCO3, FIO2 in the last 72 hours. Recent Labs 03/06/20 
7297 03/05/20 
5424 03/04/20 
0407 FIO2I 45 45 55 HCO3I 32.1* 31.8* 30.8* PCO2I 44.6 44.6 46.8* PHI 7.465* 7.461* 7.426 PO2I 123* 95 111* Imaging: 
[x]   I have personally reviewed the patients radiographs and reports XR Results (most recent): 
Results from [facility] [place] [facility] Encounter encounter on 02/29/20 XR CHEST PORT Narrative PORTABLE CHEST RADIOGRAPH  
 
CPT CODE: 91133 INDICATION: Intubated. Respiratory distress and oxygen desaturations with 
altered mental status COMPARISON: 3/5/2020. FINDINGS: 
 
Frontal view of the chest obtained at 0453 hours. Rotated exam. ET tube tip 2.7 
cm above alyssa. Feeding tube present, limited assessment. The cardiomediastinal 
silhouette is stable. Little change in appearance of bilateral mid and lower 
lung opacities and/or pleural fluid. No gross evidence of pneumothorax. Thoracic spinal fusion rods. Impression IMPRESSION: 
Support devices as above. No pneumothorax. Limited by patient rotation. Grossly stable bilateral lung process (edema versus 
infection), likely with layering pleural effusions. CT Results (most recent): 
Results from [facility] Encounter encounter on 02/29/20 CT CHEST ABD PELV W CONT Narrative CT SCAN OF THE CHEST, ABDOMEN AND PELVIS, WITH CONTRAST INDICATION: Above. Arrived with shortness of breath and abdominal pain. Septic 
shock. Hypoxia. History of sarcoidosis/COPD. Altered. Abdominal distention and 
tympani. Intraperitoneal free air on chest radiograph. Perforated viscus. COMPARISON: Chest CT 1/21/2010. No prior abdominopelvic CT in PACS. Report is 
noted in the electronic medical record (care everywhere) of previous noncontrast 
enhanced abdominopelvic CT performed elsewhere 7/21/2014. TECHNIQUE: With IV administration of 70 mL Isovue-300, without administration of 
oral contrast, helically acquired serial axial CT images through the chest, 
abdomen and pelvis were obtained. Additional coronal and sagittal reformation 
images were also performed. All CT scans at this facility are performed using dose optimization technique as 
appropriate to the performed exam, to include automated exposure control, 
adjustment of the mA and/or kV according to patient's size (Including 
appropriate matching for site-specific examinations), or use of iterative 
reconstruction technique. FINDINGS: 
 
CT chest:  
 
Endotracheal tube tip is in place cephalad to the level of the alyssa. Esophagogastric tube is noted, tip in the mid stomach. Severe pulmonary emphysematous disease again seen with extensive bullous changes 
most conspicuous in the upper lungs. Scattered scarring. There has been interval 
development of consolidative appearing lung opacities in the lower lobes 
bilaterally consistent with airspace disease likely in addition to atelectasis. The small stable subpleural pulmonary nodule described in the right lower lobe 
on the recent chest CT is again seen, slightly better visualized on the current 
study measuring approximately 4 mm, unchanged compared to the CT of 2/6/2018 
(axial 34).  Multifocal chronic nodular pleuro-parenchymal scarring in the left 
upper lobe without significant interval change compared to the preceding chest 
 CT or study of 2/6/2018. No definite suspicious new pulmonary nodule/mass identified compared to the 
preceding CT. No evidence of pathologic lymph node enlargement is seen. No evidence of pleural or significant pericardial effusion. CT abdomen/pelvis:  
 
Small ascites, best seen around the liver and in the anterior pelvis adjacent to 
the distal descending and sigmoid colon. The spleen is heterogeneous in attenuation and demonstrates several rounded 
hypodensities as detailed on the recent chest CT of 1/21/2020. Indeterminate attenuation masses in the kidneys bilaterally, 2.7 cm right kidney 
upper to midpole laterally, 2.1 cm left kidney interpolar region posteriorly. Further evaluation recommended, beginning with ultrasound might be helpful when 
clinically feasible if the corresponding lesions can be identified 
sonographically. Additional smaller subcentimeter renal hypodensities 
bilaterally, possibly cysts, too small to be specifically characterized. The liver, gallbladder, pancreas, and adrenal glands appear unremarkable. Scattered calcifications in the abdominal aorta and its major branches. The 
abdominal aorta enhances normally without abdominal aortic aneurysm. A few scattered small subcentimeter short axis lymph nodes bilaterally in the 
pelvis or retroperitoneum. No evidence of pathologic lymph node enlargement is 
seen. Moderate quantity of intraperitoneal free air in the nondependent anterior 
abdomen, consistent with perforated viscus. Additional scattered foci of free 
air elsewhere in the peritoneum and mesentery, including in the right upper 
quadrant near the gallbladder/duodenum, and scattered bilaterally in the 
mesenteric/peritoneal fat of the upper and lower abdomen. Grouped small foci of 
intraperitoneal free air are noted close to the colonic wall along the 
left/anterior side of the sigmoid colon (reference axial 112-119). The left hemicolon is diffusely mildly distended to the rectum containing gas, stool, and 
hyperdense material. Clinical correlation might be helpful regarding recent 
ingestion of hyperdense material. The rectum measures approximately 8.5 cm in 
caliber. The sigmoid measures approximately 7.3 cm in caliber on axial image 115. The site of the or free viscus is uncertain. Common sites could include 
duodenal or gastric perforation such as may be seen due to perforated ulcer. However, there is small focal hypoattenuation along the wall of the sigmoid 
colon on series 2 axial images 111-113 which could potentially reflect a small 
mural defect such as could be seen related to constipation, stercoral colitis, 
diverticular disease. The appendix is seen within the ascites in the right lower quadrant, assessment 
for stranding in the periappendiceal fat limited by the ascites, without gross 
abnormal thickening of the appendix seen. No gas within the appendix. The right 
hemicolon appears grossly unremarkable. There is mild diffuse mural thickening of small bowel loops and mild prominence 
of enhancement, nonspecific. No definite associated pneumatosis. No portal 
venous gas is seen. The SMA/SMV appear to maintain usual orientation. Broadly 
speaking differential considerations could include inflammatory, infectious, 
ischemic. Clinical correlation is recommended including with lactate 
measurement. Conceivably sequela of peritonitis in the setting of bowel 
perforation? Uterus is present. Small gas is noted within the uterus and vagina, nonspecific, 
can be seen as a physiologic finding. On review of bone windows, there is a superior endplate compression fracture 
with mildly to moderately decreased vertebral body height at L2. Bilateral 
spinal fusion rods spanning from H1-O6-B97-T12. Severe compression fracture 
deformity again seen at T9. Compression fracture with rather pronounced decreased vertebral body height again seen at T5. Mild endplate indentations at 
several other levels including T4, T6, T10. The posterior midline fluid 
collection described on the preceding study was better visualized on the 
preceding study, extensive metallic hardware artifacts noted. Impression Impression: Moderate quantity of intraperitoneal free air. Findings are consistent with 
perforated viscus. The site of perforation is uncertain, as discussed above, 
possibly the sigmoid colon where there is apparent small subtle focal 
hypoattenuation along the wall as detailed above (reference axial images 111-113) close to region where several foci of free air are grouped. The left 
hemicolon is mildly distended and filled with hyperdense appearing stool. Mild diffuse mural thickening and enhancement of the small bowel as described. Small volume of ascites best seen around the liver and in the pelvis adjacent to 
the sigmoid. Bibasilar consolidations, right greater than left, suspicious for airspace 
disease/pneumonia. Indeterminate attenuation bilateral renal masses, follow-up evaluation 
recommended. Splenic hypodensities again seen as described on recent chest CT. Intubated. Severe pulmonary emphysematous disease and fibrotic changes. Multiple otherwise nonacute findings as detailed above. I have discussed these results directly with Dr. Miky Caruso in the ED at 12:20 p.m. 
in addition to the patient's consulted surgeon at 12:25 PM. IMPRESSION:  
· Bowel viscus perforation resulting in septic shock - post op day 8 from an abdominal closure and colonostomy surgery · Resolving septic shock - improving leukocytosis, on/off vasopressures, hyper/hypotensive, normal HR, afebrile, worsening prerenal acute kidney injury · Acute respiratory failure secondary to above · Metabolic alkalosis · Episodes of tachyarrhythmia - cardiology following, currently off amiodarone drip · Thrombocytopenia · Severe pulmonary hypertension · COPD - on daily oxygen. Fi02 of 91%, · Daily smoker · Staph Gram positive and Gram negative bacteremia - ID following · Bilateral pneumonia - heavy Morexella respiratory culture · Hypothyroidism - levothyroxine · UTI -  E. coli · ELIF - resolving · Hx of sarcoidosis · Hypothyroidism - TSH at 5.11 Patient Active Problem List  
Diagnosis Code  Diabetic neuropathy associated with type 2 diabetes mellitus ([facility]) E11.40  Venous insufficiency I87.2  Obesity, Class I, BMI 30-34.9 E66.9  Chronic back pain M54.9, G89.29  
 Generalized osteoarthritis of multiple sites M15.9  Bipolar affective disorder ([facility] [address] F31.9  Abnormally low high density lipoprotein (HDL) cholesterol with hypertriglyceridemia E78.6, E78.1  Diastolic dysfunction without heart failure I51.89  Chronic obstructive pulmonary disease (COPD) ([facility] J44.9  Hypertensive heart disease without heart failure I11.9  Depression F32.9  History of back injury Z87.828  Type 2 diabetes mellitus with diabetic neuropathy, with long-term current use of insulin ([facility]) E11.40, Z79.4  Anxiety F41.9  Wears glasses Z97.3  History of hepatitis C Z86.19  
 Sickle cell trait ([facility]) D57.3  History of acute renal failure Z87.448  Hypothyroidism E03.9  Recurrent genital herpes A60.00  Sarcoidosis D86.9  Abscess of right arm L02.413  Hypoxemia requiring supplemental oxygen R09.02, Z99.81  
 Abnormal nuclear stress test R94.39  
 Cellulitis and abscess of hand L03.119, L02.519  
 Cellulitis and abscess of foot L03.119, L02.619  
 Cellulitis of arm, right L03. 113  
 Olecranon bursitis of right elbow M70.21  
 Contusion of left elbow S50. 02XA  Intravenous drug user F19.90  Right Achilles tendinitis M76.61  
 History of penicillin allergy Z88.0  Falls frequently R29.6  Gastroesophageal reflux disease with hiatal hernia K21.9, K44.9  Memory difficulty R41.3  Mixed connective tissue disease ([facility] [place] [address] M35.1  Impaired mobility and ADLs Z74.09  
 Hyperuricemia E79.0  History of vitamin D deficiency Z86.39  
 Urge urinary incontinence N39.41  Spinal cord compression ([facility]) G95.20  Compression fracture of body of thoracic vertebra ([facility]) S22.000A  Status post laminectomy with spinal fusion Z98.1  Acute blood loss as cause of postoperative anemia D62  
 History of fracture due to fall Z87.81  Chronic respiratory failure with hypoxia ([facility]) J96.11  
 Cellulitis of right forearm L03. 113  
 Gout M10.9  Menopause Z78.0  Long term current use of aspirin Z79.82  
 Opioid dependence ([facility]) F11.20  Tobacco use disorder F17.200  Sepsis ([facility] [place] [address] A41.9  Perforated viscus R19.8  Septic shock ([facility]) A41.9, R65.21  
 
  
RECOMMENDATIONS:  
Neuro:Titrate sedation for RASS goal 0 to -1, daily sedation holiday. Bilateral wrist restraints Pulm: Aspiration precautions, HOB>30'. VAP Bundle. CVS:Monitor HD, MAP goal >65. Vasopressures as needed. Currently off amiodarone drip. GI: NPO. Tube feed Renal: Trend Cr, UOP. Rousseau present Hem/Onc: Trend H/H, monitor for s/o active bleeding. Daily CBC. I/D:Trend WBCs and temperature curve. Antibiotic: zosyn Metabolic: Daily BMP, mag, phos. Trend lytes and replace per protocol. Endocrine:Q6 glucoses. SSI. Avoid hypoglycemia. Musc/Skin:  Wound VAC per surgery Full Code Discussed in interdisciplinary rounds Best practice : 
 
Glycemic control IHI ICU bundles: 
 Central Line Bundle Followed [facility] Vent patients- VAP bundle, aim to keep peak plateau pressure 06-85RT H2O Sress ulcer prophylaxis. DVT prophylaxis. Need for Lines, rousseau assessed. Restraints need. Palliative care evaluation. High complexity decision making was performed during this consultation and evaluation. [x]       Pt is at high risk for further organ failure and dysfunction. Critical care time spent:    minutes with patient exclusive of procedures. Madno POWELL student 03/06/20 Pulmonary, Critical Care Medicine 3 [facility]

## 2020-03-07 NOTE — PROGRESS NOTES
Patient was extubated at  on to NC 4l Cuff Leak Test (Positive) No Aduible Stridor HR 86 Spo2 96 RR 29

## 2020-03-07 NOTE — ROUTINE PROCESS
1930 Bedside shift change report given to mckenna alvarado (oncoming nurse) by Blake Tobar (offgoing nurse). Report included the following information SBAR, Kardex and Recent Results. side rails up , hob elevated 30 degrees. 2000 assessment completed. Oral and rousseau care completed. Soft wrist restraints off and pervious nurse said they have been dc'd/ patient  
 talking with soft voice. Friend at bedside. 2200 daughter at bedside, PRINCESS POWELL in to assess patient. 2300 IS. Given patient only able to use in 125-250 range. FRED POWELL aware. 0000 reassessment completed. Oral and rousseau care completed. 0400 reassessment completed. Oral and rousseau care completed. 425 cc out of colostomy. Medium firm stool with liquid brown. 0630 incontinent of stool firm piece with large amount liquid brown stool . Cleansed. \ 
0730 Bedside shift change report given to Blake Tobar (oncoming nurse) by mckenna alvarado (offgoing nurse). Report included the following information SBAR, Kardex and Recent Results. Side rails up, hob elevated 30 . Dual drip verification completed.

## 2020-03-07 NOTE — PROGRESS NOTES
Bedside and Verbal shift change report given to Millie Delcid RN (oncoming nurse) by Bradford Lira 
 (offgoing nurse). Report included the following information SBAR, Kardex, OR Summary, Procedure Summary, Intake/Output, MAR and Recent Results. Visit Vitals /58 Pulse 77 Temp (!) 100.9 °F (38.3 °C) Resp 28 Ht 5' 4\" (1.626 m) Wt 90.1 kg (198 lb 10.2 oz) SpO2 96% BMI 34.10 kg/m²

## 2020-03-07 NOTE — PROGRESS NOTES
[facility], [address] [facility],[address] [facility], [place] [address] Phone:[phone] Fax: [phone] CARDIOLOGY PROGRESS NOTE 
RECS: 
  
  
1. Paroxymal Atrial flutter/atrial fibrillation  with RVR- SR- Continue  amiodarone drip. Not an ideal candidate for anticoagulation due to recent abdominal surgery and thrombocytopenia- Consider ASA LA if OK with General Surgery. Ok per Dr. Valarie Kim 
2. Acute Sigmoid Colon Perforation - s/p Emergent Ex-Lap with drainage of intraabdominal stool and collections; sigmoid colectomy with Dr. Mendy Ruiz on 02/29/20.sigmoid perforation, secondary peritonitis  S/p Re-exploratory laparotomy, drainage of intra-abdominal stool collection, descending colectomy with mobilization of the splenic flexure, and transverse colostomy on 3/1. 
3. Septic Shock- on antibiotics managed by [facility] and ID 4. Hypotension- in the setting of #3 - hypotension overnight back on vasopressor support. 5. Acute on chronic diastolic heart failure-appears  compensated  
6. Hx sarcoidosis  
7. Hx of abnormal Dobutamine  stress test- 11/2016  
8. Hx of Tobacco abuse 9. Pulmonary hypertension with PAP 80 mmHg on recent echo  
10. Abnormal TSH- medications per [facility] care 
  
  
 
  
  
11/2016  
2. Dobutamine stress test was done, reported EF of 44% with a small 
reversible inferior wall defect.-medically reated 
  
  
       
02/29/20 ECHO ADULT FOLLOW-UP OR LIMITED 03/03/2020 3/3/2020  
  Narrative · Dilated right ventricle. Reduced systolic function. Abnormal right  
ventricular septal motion. Systolic flattening of interventricular septum  
consistent with right ventricle pressure overload. · Moderate tricuspid valve regurgitation is present. · Severely elevated central venous pressure (15+ mmHg); IVC diameter is  
larger than 21 mm and collapses less than 50% with respiration. · Severe pulmonary hypertension. · Pulmonary arterial systolic pressure is 09.6 mmHg · Normal cavity size, wall thickness and systolic function (ejection  
fraction normal). Estimated left ventricular ejection fraction is 55 -  
60%. · Dilated right atrium. 
   
    Signed by: Daniella Mc MD  
 
 
 
ASSESSMENT: 
Hospital Problems  Date Reviewed: 2/28/2020 Codes Class Noted POA Perforated viscus ICD-10-CM: R19.8 ICD-9-CM: 799.89  2/29/2020 Unknown Septic shock (HCC) ICD-10-CM: A41.9, R65.21 ICD-9-CM: 038.9, 785.52, 995.92  2/29/2020 Unknown SUBJECTIVE: 
No c/o dyspnea. Amiodarone 0.5 mg / min. OBJECTIVE: 
 
VS:  
Visit Vitals /61 Pulse 74 Temp 99.6 °F (37.6 °C) Resp 18 Ht 5' 4\" (1.626 m) Wt 90.4 kg (199 lb 4.7 oz) SpO2 96% BMI 34.21 kg/m² Intake/Output Summary (Last 24 hours) at 3/7/2020 1404 Last data filed at 3/7/2020 1000 Gross per 24 hour Intake 1638.71 ml Output 2225 ml Net -586.29 ml  
 
TELE: normal sinus rhythm General: No acute distress HENT: Normocephalic, atraumatic. Neck :  Supple Cardiac:  Normal S1/S2 Chest/Lungs:Course anterior Abdomen: Soft Extremities:  No edema Labs: Results:  
   
Chemistry Recent Labs 03/07/20 
0500 03/06/20 
0549 03/05/20 
1825 * 214* 194* * 147* 150*  
K 2.9* 3.5 3.4*  
* 111 113* CO2 31 30 32 BUN 16 22* 24* CREA 0.66 0.69 0.82 CA 8.0* 7.8* 8.1*  
MG 2.1 1.8 2.0 PHOS 3.1 2.8 1.9* AGAP 5 6 5 BUCR 24* 32* 29* AP 61 66 68  
TP 5.2* 5.4* 5.4* ALB 1.7* 1.5* 1.5*  
GLOB 3.5 3.9 3.9 AGRAT 0.5* 0.4* 0.4* CBC w/Diff Recent Labs 03/07/20 
0500 03/06/20 
0549 03/05/20 
0500 WBC 9.2 9.9 13.4*  
RBC 2.78* 3.10* 3.04* HGB 7.5* 8.4* 8.4* HCT 23.4* 26.3* 25.5* PLT 83* 80* 77* GRANS 80* 79* 83* LYMPH 5* 6* 10* EOS 0 0 0 Cardiac Enzymes No results for input(s): CPK, CKND1, SANTOS in the last 72 hours.  
 
No lab exists for component: Aster Chiang  
 Coagulation No results for input(s): PTP, INR, APTT, INREXT in the last 72 hours. Lipid Panel Lab Results Component Value Date/Time Cholesterol, total 141 09/30/2019 12:00 AM  
 HDL Cholesterol 39 (L) 09/30/2019 12:00 AM  
 LDL, calculated 61 09/30/2019 12:00 AM  
 VLDL, calculated 41 (H) 09/30/2019 12:00 AM  
 Triglyceride 204 (H) 09/30/2019 12:00 AM  
 CHOL/HDL Ratio 3.5 11/06/2016 04:18 AM  
  
BNP No results for input(s): BNPP in the last 72 hours. Liver Enzymes Recent Labs 03/07/20 
0500 TP 5.2* ALB 1.7* AP 61 SGOT 33 Digoxin Thyroid Studies Lab Results Component Value Date/Time TSH 5.11 (H) 03/05/2020 06:25 PM  
    
 
 
 
Karolyn Saint, MD   Pager # [phone]

## 2020-03-08 NOTE — PROGRESS NOTES
[facility], [address] [facility] [address] [facility] [address] [facility],[address] [place] Phone:[phone] Fax: [phone] CARDIOLOGY PROGRESS NOTE 
RECS: 
1. Paroxymal Atrial flutter/atrial fibrillation  with RVR- SR- Continue  amiodarone drip. Consider PO amiodarone in am. Not ideal candidate for anticoagulation due to recent abdominal surgery and thrombocytopenia-ASA HI. Ok per Dr. Cheyanne Johnson 
2. Acute Sigmoid Colon Perforation - s/p Emergent Ex-Lap with drainage of intraabdominal stool and collections; sigmoid colectomy with Dr. Trini Avalos on 02/29/20.sigmoid perforation, secondary peritonitis  S/p Re-exploratory laparotomy, drainage of intra-abdominal stool collection, descending colectomy with mobilization of the splenic flexure, and transverse colostomy on 3/1. 
3. Septic Shock- on antibiotics managed by [facility] and ID  
4. Hypotension- in the setting of #3 - hypotension overnight back on vasopressor support.  
5. Acute on chronic diastolic heart failure-appears  compensated  
6. Hx sarcoidosis  
7. Hx of abnormal Dobutamine  stress test- 11/2016  
8. Hx of Tobacco abuse 9. Pulmonary hypertension with PAP 80 mmHg on recent echo  
10. Abnormal TSH- medications per [facility] care 
  
  
  
  
  
11/2016  
2. Dobutamine stress test was done, reported EF of 44% with a small reversible inferior wall defect.-medically reated 
  
  
       
02/29/20 ECHO ADULT FOLLOW-UP OR LIMITED 03/03/2020 3/3/2020  
  Narrative · Dilated right ventricle. Reduced systolic function. Abnormal right  
ventricular septal motion. Systolic flattening of interventricular septum  
consistent with right ventricle pressure overload. · Moderate tricuspid valve regurgitation is present. · Severely elevated central venous pressure (15+ mmHg); IVC diameter is  
larger than 21 mm and collapses less than 50% with respiration. · Severe pulmonary hypertension. · Pulmonary arterial systolic pressure is 56.1 mmHg · Normal cavity size, wall thickness and systolic function (ejection  
fraction normal). Estimated left ventricular ejection fraction is 55 -  
60%. · Dilated right atrium. 
   
    Signed by: Haven Landaverde MD  
 
 
 
ASSESSMENT: 
Hospital Problems  Date Reviewed: 2/28/2020 Codes Class Noted POA Perforated viscus ICD-10-CM: R19.8 ICD-9-CM: 799.89  2/29/2020 Unknown Septic shock (HCC) ICD-10-CM: A41.9, R65.21 ICD-9-CM: 038.9, 785.52, 995.92  2/29/2020 Unknown SUBJECTIVE: 
Patient alert. No c/o dyspnea. Amiodarone 0.5 mg/min. OBJECTIVE: 
 
VS:  
Visit Vitals /78 Pulse 86 Temp 100 °F (37.8 °C) Resp 22 Ht 5' 4\" (1.626 m) Wt 91.1 kg (200 lb 13.4 oz) SpO2 100% BMI 34.47 kg/m² Intake/Output Summary (Last 24 hours) at 3/8/2020 1044 Last data filed at 3/8/2020 1015 Gross per 24 hour Intake 3169.62 ml Output 3115 ml Net 54.62 ml TELE: normal sinus rhythm General: No acute distress HENT: Normocephalic, atraumatic. Neck :  Supple Cardiac:  Normal S1/S2 Chest/Lungs:Course anterior. Abdomen: Soft Extremities:  2+ LE edema Labs: Results:  
   
Chemistry Recent Labs 03/08/20 
0420 03/07/20 
1800 03/07/20 
0500 * 148* 164* * 148* 148* K 3.0* 2.8* 2.9*  
* 113* 112* CO2 31 32 31 BUN 13 15 16 CREA 0.67 0.62 0.66  
CA 7.8* 7.6* 8.0*  
MG 2.0 2.3 2.1 PHOS 2.4* 2.8 3.1 AGAP 4 3 5 BUCR 19 24* 24* AP 58 61 61  
TP 5.2* 4.9* 5.2* ALB 1.6* 1.7* 1.7*  
GLOB 3.6 3.2 3.5 AGRAT 0.4* 0.5* 0.5* CBC w/Diff Recent Labs 03/08/20 
5607 03/07/20 
0500 03/06/20 
0235 WBC 10.9 9.2 9.9  
RBC 3.46* 2.78* 3.10* HGB 9.2* 7.5* 8.4* HCT 29.7* 23.4* 26.3*  
 83* 80* GRANS 80* 80* 79* LYMPH 12* 5* 6*  
EOS 0 0 0 Cardiac Enzymes No results for input(s): CPK, CKND1, SANTOS in the last 72 hours.  
 
No lab exists for component: Breanne Metzger  
 Coagulation No results for input(s): PTP, INR, APTT, INREXT in the last 72 hours. Lipid Panel Lab Results Component Value Date/Time Cholesterol, total 141 09/30/2019 12:00 AM  
 HDL Cholesterol 39 (L) 09/30/2019 12:00 AM  
 LDL, calculated 61 09/30/2019 12:00 AM  
 VLDL, calculated 41 (H) 09/30/2019 12:00 AM  
 Triglyceride 204 (H) 09/30/2019 12:00 AM  
 CHOL/HDL Ratio 3.5 11/06/2016 04:18 AM  
  
BNP No results for input(s): BNPP in the last 72 hours. Liver Enzymes Recent Labs 03/08/20 
9207 TP 5.2* ALB 1.6* AP 58 SGOT 32 Digoxin Thyroid Studies Lab Results Component Value Date/Time TSH 5.11 (H) 03/05/2020 06:25 PM  
    
 
 
 
Taylor Caban MD   Pager # [phone]

## 2020-03-08 NOTE — PROGRESS NOTES
[facility] I have reviewed the flowsheet and previous days notes. Events, vitals, medications and notes from last 24 hours reviewed. Care plan discussed with staff and on multidisciplinary rounds. Subjective: 
3/8/2020 Pt doing well s/p extubation. On NC, in no distress. BPs and HRs improved. Patient Active Problem List  
Diagnosis Code  Diabetic neuropathy associated with type 2 diabetes mellitus ([facility]) E11.40  Venous insufficiency I87.2  Obesity, Class I, BMI 30-34.9 E66.9  Chronic back pain M54.9, G89.29  
 Generalized osteoarthritis of multiple sites M15.9  Bipolar affective disorder [facility] F31.9  Abnormally low high density lipoprotein (HDL) cholesterol with hypertriglyceridemia E78.6, E78.1  Diastolic dysfunction without heart failure I51.89  Chronic obstructive pulmonary disease (COPD) ([facility] J44.9  Hypertensive heart disease without heart failure I11.9  Depression F32.9  History of back injury Z87.828  Type 2 diabetes mellitus with diabetic neuropathy, with long-term current use of insulin ([facility]) E11.40, Z79.4  Anxiety F41.9  Wears glasses Z97.3  History of hepatitis C Z86.19  
 Sickle cell trait ([facility]) D57.3  History of acute renal failure Z87.448  Hypothyroidism E03.9  Recurrent genital herpes A60.00  Sarcoidosis D86.9  Abscess of right arm L02.413  Hypoxemia requiring supplemental oxygen R09.02, Z99.81  
 Abnormal nuclear stress test R94.39  
 Cellulitis and abscess of hand L03.119, L02.519  
 Cellulitis and abscess of foot L03.119, L02.619  
 Cellulitis of arm, right L03. 113  
 Olecranon bursitis of right elbow M70.21  
 Contusion of left elbow S50. 02XA  Intravenous drug user F19.90  Right Achilles tendinitis M76.61  
 History of penicillin allergy Z88.0  Falls frequently R29.6  Gastroesophageal reflux disease with hiatal hernia K21.9, K44.9  Memory difficulty R41.3  Mixed connective tissue disease ([facility] M35.1  Impaired mobility and ADLs Z74.09  
 Hyperuricemia E79.0  History of vitamin D deficiency Z86.39  
 Urge urinary incontinence N39.41  Spinal cord compression ([facility]) G95.20  Compression fracture of body of thoracic vertebra ([facility]) S22.000A  Status post laminectomy with spinal fusion Z98.1  Acute blood loss as cause of postoperative anemia D62  
 History of fracture due to fall Z87.81  Chronic respiratory failure with hypoxia ([facility]) J96.11  
 Cellulitis of right forearm L03. 113  
 Gout M10.9  Menopause Z78.0  Long term current use of aspirin Z79.82  
 Opioid dependence ([facility]) F11.20  Tobacco use disorder F17.200  Seps[facility] A41.9  Perforated viscus R19.8  Septic shock ([facility]) A41.9, R65.21 Assessment: 
Acute Respiratory Failure 
- kept intubated after surgery for severe shock state - Extubated 3/7, currently on NC Colonic Perforation - s/p emergent exlap on 2/29 
- 2/2 severe constipation and stool burden - abdominal closure and ostomy 3/1 Intermittent SVT/Afib 
- currently on amiodarone gtt - No AC Septic Shock - 2/2 above, on levophed and neosynephrine 
- on ABX Troponemia 
- demand ischemia, not an MI 
B/L Pneumonia 
- sputum Cx with Moraxella Intermittent SVT 
- likely stress induced, appears to be aflutter Lactic Acidosis 
- improving ELIF 
- improving Hx of Sarcoidosis 
- follows with Dr. Lyla Calderón Hx of Illicit drug use 
- on methadone 
  
Impression/Plan: - Monitor hemodynamics and respiratory status in the ICU today - Poor expectoration- will start hypertonic nebs- no metanebs due to open abdominal wound 
- no lasix today 
- amio per cards - ABX per ID, eraxis, zosyn- repeat Cx pending 
- wean steroids, can likely be d/c'd tomorrow - Attempt PIV to get rid of CVL 
- Continue feeds, monitor for residuals, reglan 
- STart PT/OT, speech evaluation 
- heparin ppx Full code, possible transfer to step down tomorrow OTHER: 
Glycemic Control- glucose stabilizer per ICU protocol when on insulin drip. Maintain blood glucose 140-180. Replace electrolytes per ICU electrolyte replacement protocol Aspiration precautions. Sepsis bundle and protocol followed. Deescalate antibiotic when appropriate. Vasopressor when appropriate with MAP goal >65 mmHg. Central Line bundle followed, remove when not needed. Large bore IV line or CVP when appropriate. Rousseau bundle followed, remove rousseau catheter when not critically ill. PT/OT eval and treat. OOB/IS when appropriate. Quality Care: Stress ulcer prophylaxis, DVT prophylaxis, HOB elevated, Infection control all reviewed and addressed. Events and notes from last 24 hours reviewed. Care plan discussed with nursing. D/w patient and family above medical problems and answered all questions to their satisfaction. CC TIME: >45  min Medication Reviewed: Allergies Allergen Reactions  Moxifloxacin Rash  Pcn [Penicillins] Swelling  Sulfa (Sulfonamide Antibiotics) Swelling Past Medical History:  
Diagnosis Date  Abnormally low high density lipoprotein (HDL) cholesterol with hypertriglyceridemia Lipid profile (11/6/2016) showed , , HDL 38, LDL 37  Acute blood loss as cause of postoperative anemia 12/12/2019  Anxiety  Bipolar affective disorder ([address] 12/5/2012  Cellulitis of right forearm 05/04/2017  Chronic back pain  Chronic obstructive pulmonary disease (COPD) ([address] SOB, on [place] O2 Recent admission with psychosis  Chronic respiratory failure with hypoxia ([address] On home oxygen inhalation at 3 LPM via NC  
 Contusion of left elbow 5/4/2017  Depression  Diabetic neuropathy associated with type 2 diabetes mellitus ([address]  Diastolic dysfunction without heart failure Stable on diuretics  Falls frequently  Gastroesophageal reflux disease with hiatal hernia  Generalized osteoarthritis of multiple sites  Gout On Allopurinol  History of acute renal failure 2013  History of back injury   
 jumped out of second story window  History of fracture due to fall 2019  
 History of hepatitis C   
 treated  History of penicillin allergy  History of vitamin D deficiency 5/10/2017  Hypertensive heart disease without heart failure Better controlled  Hyperuricemia 2017  Hypothyroidism  Hypoxemia requiring supplemental oxygen 2014  Intravenous drug user 2017  Long term current use of aspirin  Memory difficulty  Menopause  Mixed connective tissue disease ([facility] 5/10/2017  Obesity, Class I, BMI 30-34.9  Olecranon bursitis of right elbow 2017  Opioid dependence ([facility] On Methadone  Recurrent genital herpes 2013  Right Achilles tendinitis 2017  Sarcoidosis  Sickle cell trait ([facility]  Tobacco use disorder  Type 2 diabetes mellitus with diabetic neuropathy, with long-term current use of insulin ([facility] HbA1c (2019) = 7.1  Urge urinary incontinence 5/10/2017  Venous insufficiency  Wears glasses Past Surgical History:  
[facility]  HX  SECTION    
 HX CYST REMOVAL Left 1979 Left foot  HX OTHER SURGICAL Left 2017 S/P incision and drainage of the abscess of the left hand and the left foot (2017 - Dr. Javid Villalta. Kassy Edwards)  HX THORACIC LAMINECTOMY  2019 S/P T10, T9, T8 laminectomy; T7, T8, T9, T10, T11, T12 posterior thoracic fusion; segmental spinal instrumentation; K2M [place] type, T7-T8, T11-T12 (2019 - Dr. Wilver Quinteros)  HX TUBAL LIGATION Social History Tobacco Use  Smoking status: Current Every Day Smoker Packs/day: 1.00 Years: 30.00 Pack years: 30.00 Types: Cigarettes Start date: 12/2/1969  Smokeless tobacco: Never Used Substance Use Topics  Alcohol use: No  
  
Family History Problem Relation Age of Onset  Seizures Other  Diabetes Mother  Hypertension Mother  Heart Disease Mother  Cancer Mother  Diabetes Father  Stroke Father  Heart Disease Father  Headache Sister  Depression Neg Hx  Suicide Neg Hx  Psychotic Disorder Neg Hx  Substance Abuse Neg Hx  Dementia Neg Hx Prior to Admission medications Medication Sig Start Date End Date Taking? Authorizing Provider  
methadone (DOLOPHINE) 10 mg/mL solution Take 25 mg by mouth daily. Yes Provider, Historical  
roflumilast (DALIRESP) 250 mcg tab Take 250 mcg by mouth daily. 2/28/20   Estela Ingram MD  
ARIPiprazole (ABILIFY) 10 mg tablet Take 1 Tab by mouth. Provider, Historical  
aspirin-calcium carbonate 81 mg-300 mg calcium(777 mg) tab Take 1 Tab by mouth. Provider, Historical  
divalproex DR (DEPAKOTE) 250 mg tablet Take 1 Tab by mouth. Provider, Historical  
doxycycline (MONODOX) 100 mg capsule  11/20/19   Provider, Historical  
gabapentin (NEURONTIN) 300 mg capsule Take 1 Cap by mouth. Provider, Historical  
ipratropium (ATROVENT) 0.02 % soln  1/22/20   Provider, Historical  
methylPREDNISolone (MEDROL DOSEPACK) 4 mg tablet  12/5/19   Provider, Historical  
oxyCODONE-acetaminophen (PERCOCET) 5-325 mg per tablet Take 1 Tab by mouth. Provider, Historical  
tolterodine (DETROL) 1 mg tablet Take 1 Tab by mouth. Provider, Historical  
traMADol (ULTRAM) 50 mg tablet Take 1 Tab by mouth. Provider, Historical  
traZODone (DESYREL) 100 mg tablet Take 1 Tab by mouth. Provider, Historical  
busPIRone (BUSPAR) 15 mg tablet Take 15 mg by mouth two (2) times a day. Indications: repeated episodes of anxiety 2/13/20   Jennifer Riley NP  
OXYGEN-AIR DELIVERY SYSTEMS 3 L/min by Nasal route continuous.  [employer] O2    Provider, Historical  
albuterol-ipratropium (DUO-NEB) 2.5 mg-0.5 mg/3 ml nebu 3 mL by Nebulization route every six (6) hours as needed for Wheezing. 1/22/20   Franklin Muir MD  
busPIRone (BUSPAR) 10 mg tablet Take 10 mg by mouth three (3) times daily. Indications: repeated episodes of anxiety    Provider, Historical  
cholecalciferol (VITAMIN D3) (1000 Units /25 mcg) tablet Take 1,000 Units by mouth two (2) times a day. Provider, Historical  
levothyroxine (SYNTHROID) 100 mcg tablet Take 100 mcg by mouth Daily (before breakfast). Provider, Historical  
insulin glargine (LANTUS U-100 INSULIN) 100 unit/mL injection 20 Units by SubCUTAneous route Daily (before breakfast). Provider, Historical  
famotidine (PEPCID) 20 mg tablet Take 20 mg by mouth nightly. Provider, Historical  
aspirin 81 mg chewable tablet Take 1 Tab by mouth daily (with breakfast). Indications: stroke prevention 1/13/20   Mg Herring MD  
docusate sodium (COLACE) 100 mg capsule Take 1 Cap by mouth daily (after breakfast). Indications: constipation 1/13/20   Mg Herring MD  
predniSONE (DELTASONE) 20 mg tablet Take 20 mg by mouth daily (with breakfast). Indications: sarcoidosis Patient taking differently: Take 30 mg by mouth daily (with breakfast). Indications: sarcoidosis 1/14/20   Mg Herring MD  
ascorbic acid, vitamin C, (VITAMIN C) 250 mg tablet Take 1 Tab by mouth daily (with breakfast). 1/14/20   Mg Herring MD  
calcium citrate 200 mg (950 mg) tablet Take 1 Tab by mouth two (2) times a day. Indications: post-menopausal osteoporosis prevention 1/14/20   Mg Herring MD  
ferrous sulfate 325 mg (65 mg iron) tablet Take 1 Tab by mouth daily (with breakfast). 1/14/20   Mg Herring MD  
acetaminophen (TYLENOL) 325 mg tablet Take 2 Tabs by mouth every four (4) hours as needed for Pain.  Indications: pain 1/13/20   Mg Herring MD  
 brief disposable (ADULT) misc by Does Not Apply route. 1/9/20   Chasity Bailey MD  
polyethylene glycol [facility]) 17 gram/dose powder Take 17 g by mouth daily. 1 tablespoon with 8 oz of water daily 11/29/19   Radha Theodore, PA-C  
umeclidinium-vilanterol [facility] ELLIPTA) 62.5-25 mcg/actuation inhaler Take 1 Puff by inhalation daily. 11/20/19   Chicho Elaine MD  
BD INSULIN SYRINGE ULTRA-FINE 1 mL 31 gauge x 5/16 syrg  11/5/19   Provider, Historical  
rosuvastatin (CRESTOR) 5 mg tablet TAKE ONE TABLET NIGHTLY Patient taking differently: Take 5 mg by mouth nightly. TAKE ONE TABLET NIGHTLY 2/21/19   Franklyn Hope MD  
albuterol (PROAIR HFA) 90 mcg/actuation inhaler INHALE 2 PUFFS EVERY 4 HOURS AS NEEDED FOR WHEEZING 1/31/19   Chicho Elaine MD  
oxybutynin (DITROPAN) 5 mg tablet Take 5 mg by mouth two (2) times a day. Provider, Historical  
allopurinol (ZYLOPRIM) 100 mg tablet Take 100 mg by mouth daily. Provider, Historical  
insulin lispro (HUMALOG) 100 unit/mL injection To be given 15 min before meals: < 150 - None, 151-200 - 2 u, 201-250 - 4 u, 251-300 - 6 u, 301-350 - 8 u, 351-400 - 10 u, >400 - 12 u Patient taking differently: by SubCUTAneous route once. To be given 15 min before meals: < 150 - None, 151-200 - 2 u, 201-250 - 4 u, 251-300 - 6 u, 301-350 - 8 u, 351-400 - 10 u, >400 - 12 u 5/31/17   Chasity Bailey MD  
insulin syringe,safetyneedle 1 mL 30 gauge x 5/16\" syrg As directed 11/8/16   Tray Collado MD  
Nebulizer Accessories Kit Use with nebulizer machine 10/31/12   Chicho Elaine MD  
sertraline (ZOLOFT) 50 mg tablet Take 50 mg by mouth daily. Indications: Bipolar Depression    Provider, Historical  
 
Current Facility-Administered Medications Medication Dose Route Frequency  Vancomycin TROUGH DUE @ 6581 03/08/2020  1 Each Other Once per day on Sun  
 sodium chloride 3% hypertonic nebulizer soln  4 mL Nebulization Q12H  vancomycin (VANCOCIN) 1,000 mg in 0.9% sodium chloride (MBP/ADV) 250 mL adv  1,000 mg IntraVENous Q12H  hydrocortisone Sod Succ (PF) (SOLU-CORTEF) injection 50 mg  50 mg IntraVENous DAILY  aspirin chewable tablet 81 mg  81 mg Oral DAILY  levothyroxine (SYNTHROID) injection 75 mcg  75 mcg IntraVENous DAILY  insulin glargine (LANTUS) injection 16 Units  16 Units SubCUTAneous DAILY  polyethylene glycol (MIRALAX) packet 17 g  17 g Oral DAILY  multivit-folic acid-herbal 267 (WELLESSE PLUS) oral liquid 30 mL  30 mL Per NG tube DAILY  fluconazole (DIFLUCAN) 400mg/200 mL IVPB (premix)  400 mg IntraVENous Q24H  piperacillin-tazobactam (ZOSYN) 4.5 g in 0.9% sodium chloride (MBP/ADV) 100 mL MBP  4.5 g IntraVENous Q6H  
 amiodarone (NEXTERONE) 360 mg in dextrose 200 mL (1.8 mg/mL) infusion  0.5-1 mg/min IntraVENous TITRATE  pantoprazole (PROTONIX) 40 mg in 0.9% sodium chloride 10 mL injection  40 mg IntraVENous Q12H  
 albuterol-ipratropium (DUO-NEB) 2.5 MG-0.5 MG/3 ML  3 mL Nebulization Q4H RT  
 budesonide (PULMICORT) 500 mcg/2 ml nebulizer suspension  500 mcg Nebulization BID RT  
 insulin lispro (HUMALOG) injection   SubCUTAneous Q6H Lines: All central lines examined by me. No signs of erythema, induration, discharge. Central Venous Catheter: 
Triple Lumen IJ Central Line 02/29/20 Right Internal jugular (Active) Central Line Being Utilized Yes 3/8/2020  8:59 AM  
Criteria for Appropriate Use Limited/no vessel suitable for conventional peripheral access 3/8/2020  8:59 AM  
Site Assessment Clean, dry, & intact 3/8/2020  8:59 AM  
Infiltration Assessment 0 3/8/2020  8:59 AM  
Affected Extremity/Extremities Color distal to insertion site pink (or appropriate for race) 3/8/2020  8:59 AM  
Date of Last Dressing Change 03/06/20 3/8/2020  8:59 AM  
Dressing Status Clean, dry, & intact 3/8/2020  8:59 AM  
Dressing Type Disk with Chlorhexadine gluconate (CHG); Transparent 3/8/2020  8:59 AM  
 Action Taken Open ports on tubing capped; Tubing changed 3/8/2020  4:00 AM  
Proximal Hub Color/Line Status White; Infusing 3/8/2020  8:59 AM  
Positive Blood Return (Medial Site) Yes 3/8/2020  8:59 AM  
Medial Hub Color/Line Status Capped 3/8/2020  8:59 AM  
Positive Blood Return (Lateral Site) No 3/8/2020  8:59 AM  
Distal Hub Color/Line Status Brown; Infusing 3/8/2020  8:59 AM  
Positive Blood Return (Site #3) Yes 3/8/2020  8:59 AM  
Alcohol Cap Used Yes 3/8/2020  4:00 AM  
 
Peripheral Intravenous Line: 
Peripheral IV 02/29/20 Right;Upper Arm (Active) Site Assessment Clean, dry, & intact 3/8/2020  4:00 AM  
Phlebitis Assessment 0 3/8/2020  4:00 AM  
Infiltration Assessment 0 3/8/2020  4:00 AM  
Dressing Status Clean, dry, & intact 3/8/2020  4:00 AM  
Dressing Type Other (comments); Tape 3/8/2020  4:00 AM  
Hub Color/Line Status Pink;Capped 3/8/2020  4:00 AM  
Action Taken Open ports on tubing capped 3/8/2020  4:00 AM  
Alcohol Cap Used Yes 3/8/2020  4:00 AM  
 
Drain(s): 
Nasogastric Tube 02/29/20 (Active) Site Assessment Clean, dry, & intact 3/8/2020  8:59 AM  
Securement Device Adhesive-based melton 3/8/2020  8:59 AM  
G Port Status Infusing 3/8/2020  8:59 AM  
External Insertion Lit (cms) 64 cms 3/8/2020  8:59 AM  
Action Taken Placement verified (comment) 3/8/2020  8:59 AM  
Drainage Description Tan 3/8/2020  8:59 AM  
Gastric Residual (mL) 30 ml 3/8/2020 12:00 PM  
Tube Feeding/Verify Rate (mL/hr) 30 3/8/2020 12:00 AM  
Water Flush Volume (mL) 100 mL 3/8/2020 12:00 PM  
Intake (ml) 40 ml 3/8/2020  1:00 PM  
Medication Volume 30 ml 3/7/2020  2:00 AM  
Drainage Chamber Level (ml) 1100 ml 3/4/2020  7:30 AM  
Output (ml) 0 ml 3/4/2020  7:30 AM  
 
 
Objective: 
Vital Signs:   
Visit Vitals /69 Pulse 82 Temp 98.9 °F (37.2 °C) Resp 26 Ht 5' 4\" (1.626 m) Wt 91.1 kg (200 lb 13.4 oz) Comment: with wound vac on bed  
LMP 11/25/2012 (Exact Date) SpO2 97% BMI 34.47 kg/m² O2 Device: Nasal cannula O2 Flow Rate (L/min): 5 l/min Temp (24hrs), Av.4 °F (37.4 °C), Min:98.9 °F (37.2 °C), Max:100 °F (37.8 °C) Intake/Output:  
Last shift:      701 - 1900 In: 540 [I.V.:100] Out: - Last 3 shifts: 1901 - 700 In: 4102.8 [I.V.:2512.8] Out: 9864 [Urine:3115; ZFEBZE:739] Intake/Output Summary (Last 24 hours) at 3/8/2020 1528 Last data filed at 3/8/2020 1300 Gross per 24 hour Intake 2649.62 ml Output 2615 ml Net 34.62 ml Last 3 Recorded Weights in this Encounter 20 1638 20 0300 202 Weight: 90.4 kg (199 lb 4.7 oz) 86.9 kg (191 lb 9.3 oz) 91.1 kg (200 lb 13.4 oz) Ventilator Settings: 
Mode Rate Tidal Volume Pressure FiO2 PEEP Spontaneous   450 ml  8 cm H2O 40 % 5 cm H20 Peak airway pressure: 17 cm H2O Plateau pressure:   
Tidal volume:  
Minute ventilation: 9 l/min SPO2  
 
ARDS network Guidelines: Lung protective strategy and Plateau pressure goals less than or equal to 30 Physical Exam:  
Gen: awake and alert, interactive HEENT: pupils reactive, neck supple, on NC Resp: Decreased at the bases, no wheezing or rales Card: RRR, no murmurs Abd: No BS, ostomy in place, small amount of stool, laparotomy incision Ext: good distal pulses, minimal LE edema Neuro: intact, non focal 
  
 
Data: 
   
Recent Results (from the past 24 hour(s)) GLUCOSE, POC Collection Time: 20  5:23 PM  
Result Value Ref Range Glucose (POC) 180 (H) 70 - 110 mg/dL MAGNESIUM Collection Time: 20  6:00 PM  
Result Value Ref Range Magnesium 2.3 1.6 - 2.6 mg/dL METABOLIC PANEL, COMPREHENSIVE Collection Time: 20  6:00 PM  
Result Value Ref Range Sodium 148 (H) 136 - 145 mmol/L Potassium 2.8 (LL) 3.5 - 5.5 mmol/L Chloride 113 (H) 100 - 111 mmol/L  
 CO2 32 21 - 32 mmol/L Anion gap 3 3.0 - 18 mmol/L Glucose 148 (H) 74 - 99 mg/dL  BUN 15 7.0 - 18 MG/DL  
 Creatinine 0.62 0.6 - 1.3 MG/DL  
 BUN/Creatinine ratio 24 (H) 12 - 20 GFR est AA >60 >60 ml/min/1.73m2 GFR est non-AA >60 >60 ml/min/1.73m2 Calcium 7.6 (L) 8.5 - 10.1 MG/DL Bilirubin, total 0.4 0.2 - 1.0 MG/DL  
 ALT (SGPT) 18 13 - 56 U/L  
 AST (SGOT) 30 10 - 38 U/L Alk. phosphatase 61 45 - 117 U/L Protein, total 4.9 (L) 6.4 - 8.2 g/dL Albumin 1.7 (L) 3.4 - 5.0 g/dL Globulin 3.2 2.0 - 4.0 g/dL A-G Ratio 0.5 (L) 0.8 - 1.7 PHOSPHORUS Collection Time: 03/07/20  6:00 PM  
Result Value Ref Range Phosphorus 2.8 2.5 - 4.9 MG/DL  
GLUCOSE, POC Collection Time: 03/08/20 12:32 AM  
Result Value Ref Range Glucose (POC) 146 (H) 70 - 110 mg/dL MAGNESIUM Collection Time: 03/08/20  4:20 AM  
Result Value Ref Range Magnesium 2.0 1.6 - 2.6 mg/dL METABOLIC PANEL, COMPREHENSIVE Collection Time: 03/08/20  4:20 AM  
Result Value Ref Range Sodium 148 (H) 136 - 145 mmol/L Potassium 3.0 (L) 3.5 - 5.5 mmol/L Chloride 113 (H) 100 - 111 mmol/L  
 CO2 31 21 - 32 mmol/L Anion gap 4 3.0 - 18 mmol/L Glucose 147 (H) 74 - 99 mg/dL BUN 13 7.0 - 18 MG/DL Creatinine 0.67 0.6 - 1.3 MG/DL  
 BUN/Creatinine ratio 19 12 - 20 GFR est AA >60 >60 ml/min/1.73m2 GFR est non-AA >60 >60 ml/min/1.73m2 Calcium 7.8 (L) 8.5 - 10.1 MG/DL Bilirubin, total 0.5 0.2 - 1.0 MG/DL  
 ALT (SGPT) 19 13 - 56 U/L  
 AST (SGOT) 32 10 - 38 U/L Alk. phosphatase 58 45 - 117 U/L Protein, total 5.2 (L) 6.4 - 8.2 g/dL Albumin 1.6 (L) 3.4 - 5.0 g/dL Globulin 3.6 2.0 - 4.0 g/dL A-G Ratio 0.4 (L) 0.8 - 1.7 PHOSPHORUS Collection Time: 03/08/20  4:20 AM  
Result Value Ref Range Phosphorus 2.4 (L) 2.5 - 4.9 MG/DL  
CBC WITH AUTOMATED DIFF Collection Time: 03/08/20  4:20 AM  
Result Value Ref Range WBC 10.9 4.6 - 13.2 K/uL  
 RBC 3.46 (L) 4.20 - 5.30 M/uL HGB 9.2 (L) 12.0 - 16.0 g/dL HCT 29.7 (L) 35.0 - 45.0 %  MCV 85.8 74.0 - 97.0 FL  
 MCH 26.6 24.0 - 34.0 PG  
 MCHC 31.0 31.0 - 37.0 g/dL  
 RDW 16.7 (H) 11.6 - 14.5 % PLATELET 852 978 - 225 K/uL MPV 11.8 9.2 - 11.8 FL  
 NEUTROPHILS 80 (H) 42 - 75 % BAND NEUTROPHILS 7 (H) 0 - 5 % LYMPHOCYTES 12 (L) 20 - 51 % MONOCYTES 1 (L) 2 - 9 % EOSINOPHILS 0 0 - 5 % BASOPHILS 0 0 - 3 %  
 ABS. NEUTROPHILS 9.5 (H) 1.8 - 8.0 K/UL  
 ABS. LYMPHOCYTES 1.3 0.8 - 3.5 K/UL  
 ABS. MONOCYTES 0.1 0 - 1.0 K/UL  
 ABS. EOSINOPHILS 0.0 0.0 - 0.4 K/UL  
 ABS. BASOPHILS 0.0 0.0 - 0.06 K/UL  
 DF MANUAL PLATELET COMMENTS ADEQUATE PLATELETS    
 RBC COMMENTS TARGET CELLS 1+ RBC COMMENTS POLYCHROMASIA 1+ CALCIUM, IONIZED Collection Time: 03/08/20  4:20 AM  
Result Value Ref Range Ionized Calcium 1.04 (L) 1.12 - 1.32 MMOL/L  
GLUCOSE, POC Collection Time: 03/08/20  5:43 AM  
Result Value Ref Range Glucose (POC) 161 (H) 70 - 110 mg/dL GLUCOSE, POC Collection Time: 03/08/20 11:53 AM  
Result Value Ref Range Glucose (POC) 154 (H) 70 - 110 mg/dL Chemistry Recent Labs 03/08/20 
0420 03/07/20 
1800 03/07/20 
0500 * 148* 164* * 148* 148* K 3.0* 2.8* 2.9*  
* 113* 112* CO2 31 32 31 BUN 13 15 16 CREA 0.67 0.62 0.66  
CA 7.8* 7.6* 8.0*  
MG 2.0 2.3 2.1 PHOS 2.4* 2.8 3.1 AGAP 4 3 5 BUCR 19 24* 24* AP 58 61 61  
TP 5.2* 4.9* 5.2* ALB 1.6* 1.7* 1.7*  
GLOB 3.6 3.2 3.5 AGRAT 0.4* 0.5* 0.5* CBC w/Diff Recent Labs 03/08/20 
3395 03/07/20 
0500 03/06/20 
9456 WBC 10.9 9.2 9.9  
RBC 3.46* 2.78* 3.10* HGB 9.2* 7.5* 8.4* HCT 29.7* 23.4* 26.3*  
 83* 80* GRANS 80* 80* 79* LYMPH 12* 5* 6*  
EOS 0 0 0 ABG Recent Labs 03/07/20 
0327 03/06/20 
0377 PHI 7.537* 7.465* PCO2I 36.7 44.6 PO2I 68* 123* HCO3I 31.1* 32.1*  
FIO2I 40 45 Micro Recent Labs 03/05/20 2125 CULT AEROBIC BOTTLE STAPHYLOCOCCUS SPECIES, COAGULASE NEGATIVE* Recent Labs 03/05/20 2125 CULT AEROBIC BOTTLE STAPHYLOCOCCUS SPECIES, COAGULASE NEGATIVE* CT (Most Recent) Results from [facility] Encounter encounter on 02/29/20 CT CHEST ABD PELV W CONT Narrative CT SCAN OF THE CHEST, ABDOMEN AND PELVIS, WITH CONTRAST INDICATION: Above. Arrived with shortness of breath and abdominal pain. Septic 
shock. Hypoxia. History of sarcoidosis/COPD. Altered. Abdominal distention and 
tympani. Intraperitoneal free air on chest radiograph. Perforated viscus. COMPARISON: Chest CT 1/21/2010. No prior abdominopelvic CT in PACS. Report is 
noted in the electronic medical record (care everywhere) of previous noncontrast 
enhanced abdominopelvic CT performed elsewhere 7/21/2014. TECHNIQUE: With IV administration of 70 mL Isovue-300, without administration of 
oral contrast, helically acquired serial axial CT images through the chest, 
abdomen and pelvis were obtained. Additional coronal and sagittal reformation 
images were also performed. All CT scans at this facility are performed using dose optimization technique as 
appropriate to the performed exam, to include automated exposure control, 
adjustment of the mA and/or kV according to patient's size (Including 
appropriate matching for site-specific examinations), or use of iterative 
reconstruction technique. FINDINGS: 
 
CT chest:  
 
Endotracheal tube tip is in place cephalad to the level of the alyssa. Esophagogastric tube is noted, tip in the mid stomach. Severe pulmonary emphysematous disease again seen with extensive bullous changes 
most conspicuous in the upper lungs. Scattered scarring. There has been interval 
development of consolidative appearing lung opacities in the lower lobes 
bilaterally consistent with airspace disease likely in addition to atelectasis. The small stable subpleural pulmonary nodule described in the right lower lobe 
on the recent chest CT is again seen, slightly better visualized on the current study measuring approximately 4 mm, unchanged compared to the CT of 2/6/2018 
(axial 34). Multifocal chronic nodular pleuro-parenchymal scarring in the left 
upper lobe without significant interval change compared to the preceding chest 
CT or study of 2/6/2018. No definite suspicious new pulmonary nodule/mass identified compared to the 
preceding CT. No evidence of pathologic lymph node enlargement is seen. No evidence of pleural or significant pericardial effusion. CT abdomen/pelvis:  
 
Small ascites, best seen around the liver and in the anterior pelvis adjacent to 
the distal descending and sigmoid colon. The spleen is heterogeneous in attenuation and demonstrates several rounded 
hypodensities as detailed on the recent chest CT of 1/21/2020. Indeterminate attenuation masses in the kidneys bilaterally, 2.7 cm right kidney 
upper to midpole laterally, 2.1 cm left kidney interpolar region posteriorly. Further evaluation recommended, beginning with ultrasound might be helpful when 
clinically feasible if the corresponding lesions can be identified 
sonographically. Additional smaller subcentimeter renal hypodensities 
bilaterally, possibly cysts, too small to be specifically characterized. The liver, gallbladder, pancreas, and adrenal glands appear unremarkable. Scattered calcifications in the abdominal aorta and its major branches. The 
abdominal aorta enhances normally without abdominal aortic aneurysm. A few scattered small subcentimeter short axis lymph nodes bilaterally in the 
pelvis or retroperitoneum. No evidence of pathologic lymph node enlargement is 
seen. Moderate quantity of intraperitoneal free air in the nondependent anterior 
abdomen, consistent with perforated viscus.  Additional scattered foci of free 
air elsewhere in the peritoneum and mesentery, including in the right upper 
quadrant near the gallbladder/duodenum, and scattered bilaterally in the 
 mesenteric/peritoneal fat of the upper and lower abdomen. Grouped small foci of 
intraperitoneal free air are noted close to the colonic wall along the 
left/anterior side of the sigmoid colon (reference axial 112-119). The left 
hemicolon is diffusely mildly distended to the rectum containing gas, stool, and 
hyperdense material. Clinical correlation might be helpful regarding recent 
ingestion of hyperdense material. The rectum measures approximately 8.5 cm in 
caliber. The sigmoid measures approximately 7.3 cm in caliber on axial image 115. The site of the or free viscus is uncertain. Common sites could include 
duodenal or gastric perforation such as may be seen due to perforated ulcer. However, there is small focal hypoattenuation along the wall of the sigmoid 
colon on series 2 axial images 111-113 which could potentially reflect a small 
mural defect such as could be seen related to constipation, stercoral colitis, 
diverticular disease. The appendix is seen within the ascites in the right lower quadrant, assessment 
for stranding in the periappendiceal fat limited by the ascites, without gross 
abnormal thickening of the appendix seen. No gas within the appendix. The right 
hemicolon appears grossly unremarkable. There is mild diffuse mural thickening of small bowel loops and mild prominence 
of enhancement, nonspecific. No definite associated pneumatosis. No portal 
venous gas is seen. The SMA/SMV appear to maintain usual orientation. Broadly 
speaking differential considerations could include inflammatory, infectious, 
ischemic. Clinical correlation is recommended including with lactate 
measurement. Conceivably sequela of peritonitis in the setting of bowel 
perforation? Uterus is present. Small gas is noted within the uterus and vagina, nonspecific, 
can be seen as a physiologic finding. On review of bone windows, there is a superior endplate compression fracture with mildly to moderately decreased vertebral body height at L2. Bilateral 
spinal fusion rods spanning from K9-F6-P24-T12. Severe compression fracture 
deformity again seen at T9. Compression fracture with rather pronounced 
decreased vertebral body height again seen at T5. Mild endplate indentations at 
several other levels including T4, T6, T10. The posterior midline fluid 
collection described on the preceding study was better visualized on the 
preceding study, extensive metallic hardware artifacts noted. Impression Impression: Moderate quantity of intraperitoneal free air. Findings are consistent with 
perforated viscus. The site of perforation is uncertain, as discussed above, 
possibly the sigmoid colon where there is apparent small subtle focal 
hypoattenuation along the wall as detailed above (reference axial images 111-113) close to region where several foci of free air are grouped. The left 
hemicolon is mildly distended and filled with hyperdense appearing stool. Mild diffuse mural thickening and enhancement of the small bowel as described. Small volume of ascites best seen around the liver and in the pelvis adjacent to 
the sigmoid. Bibasilar consolidations, right greater than left, suspicious for airspace 
disease/pneumonia. Indeterminate attenuation bilateral renal masses, follow-up evaluation 
recommended. Splenic hypodensities again seen as described on recent chest CT. Intubated. Severe pulmonary emphysematous disease and fibrotic changes. Multiple otherwise nonacute findings as detailed above. I have discussed these results directly with Dr. Sadie Santos in the ED at 12:20 p.m. 
in addition to the patient's consulted surgeon at 12:25 PM. 
 
  
 
 
XR (Most Recent). CXR reviewed by me and compared with previous CXR Results from [facility] [place]counter encounter on 02/29/20 XR CHEST PORT Narrative EXAM: CHEST  CPT CODE: 09061 CLINICAL INDICATION/HISTORY: Intubated. COMPARISON: 7 March 2020. TECHNIQUE: Single AP portable view of chest at 0416. FINDINGS: There is underpenetrated technique in the apices are partially 
obscured by the patient's head. There is a nasogastric tube with the tip below 
the diaphragm and off the bottom of the image at the previously noted 
endotracheal tube is not definitely seen. There is a right internal jugular 
central venous catheter with tip in the distal superior vena cava. Hazy 
increasing opacity is seen in both lung bases with obscuration hemidiaphragms as 
well as increased hilar interstitial prominence, most consistent with 
interstitial infiltrate/edema and basilar effusions and atelectasis. The 
cardiomediastinal silhouette is normal.  The bones and soft tissues are 
unremarkable except for posterior fusion hardware at the mid thoracic spine, 
unchanged. Impression IMPRESSION: An endotracheal tube is not definitely seen but is somewhat underpenetrated 
study. Persistent bilateral basilar infiltrates edema and probable 
effusion/atelectasis. High complexity decision making was performed during the evaluation of this patient at high risk for decompensation with multiple organ involvement Above mentioned total time spent on reviewing the case/medical record/data/notes/EMR/patient examination/documentation/coordinating care with nurse/consultants, exclusive of procedures with complex decision making performed and > 50% time spent in face to face evaluation. Pratik Adkins MD 
Critical Care Medicine

## 2020-03-08 NOTE — PROGRESS NOTES
Problem: Diabetes Self-Management Goal: *Disease process and treatment process Description Define diabetes and identify own type of diabetes; list 3 options for treating diabetes. Outcome: Progressing Towards Goal 
  
Problem: Patient Education: Go to Patient Education Activity Goal: Patient/Family Education Outcome: Progressing Towards Goal 
  
Problem: Falls - Risk of 
Goal: *Absence of Falls Description Document Rosanna Camp Fall Risk and appropriate interventions in the flowsheet. Outcome: Progressing Towards Goal 
Note: Fall Risk Interventions: 
Mobility Interventions: Communicate number of staff needed for ambulation/transfer, Bed/chair exit alarm, Strengthening exercises (ROM-active/passive) Mentation Interventions: Adequate sleep, hydration, pain control, Bed/chair exit alarm, Door open when patient unattended, Update white board, Toileting rounds, Reorient patient, More frequent rounding Medication Interventions: Evaluate medications/consider consulting pharmacy, Bed/chair exit alarm, Patient to call before getting OOB, Teach patient to arise slowly Elimination Interventions: Bed/chair exit alarm, Call light in reach, Elevated toilet seat, Patient to call for help with toileting needs, Stay With Me (per policy), Toilet paper/wipes in reach, Toileting schedule/hourly rounds History of Falls Interventions: Bed/chair exit alarm, Consult care management for discharge planning, Door open when patient unattended, Evaluate medications/consider consulting pharmacy, Investigate reason for fall, Room close to nurse's station, Utilize gait belt for transfer/ambulation, Assess for delayed presentation/identification of injury for 48 hrs (comment for end date) Problem: Pressure Injury - Risk of 
Goal: *Prevention of pressure injury Description Document Florian Scale and appropriate interventions in the flowsheet. Outcome: Progressing Towards Goal 
Note: Pressure Injury Interventions: Sensory Interventions: Assess changes in LOC, Assess need for specialty bed, Float heels, Keep linens dry and wrinkle-free, Minimize linen layers, Monitor skin under medical devices, Pressure redistribution bed/mattress (bed type), Turn and reposition approx. every two hours (pillows and wedges if needed), Use 30-degree side-lying position Moisture Interventions: Absorbent underpads, Apply protective barrier, creams and emollients, Assess need for specialty bed, Check for incontinence Q2 hours and as needed, Contain wound drainage, Internal/External urinary devices, Maintain skin hydration (lotion/cream), Minimize layers Activity Interventions: Pressure redistribution bed/mattress(bed type), Assess need for specialty bed Mobility Interventions: Assess need for specialty bed, Float heels, HOB 30 degrees or less, Pressure redistribution bed/mattress (bed type), Turn and reposition approx. every two hours(pillow and wedges) Nutrition Interventions: Discuss nutritional consult with provider, Document food/fluid/supplement intake Friction and Shear Interventions: Feet elevated on foot rest, Transferring/repositioning devices, Minimize layers, HOB 30 degrees or less, Foam dressings/transparent film/skin sealants Problem: Patient Education: Go to Patient Education Activity Goal: Patient/Family Education Outcome: Progressing Towards Goal 
  
Problem: Surgical Wound Care Goal: *Non-infected Wound: Absence of infection signs and symptoms Description Infection control procedures (eg: clean dressings, clean gloves, hand washing, precautions to isolate wound from contamination, sterile instruments used for wound debridement) should be implemented. Outcome: Progressing Towards Goal 
  
Problem: Surgical Wound Care Goal: *Improvement of existing wound and maintenance of skin integrity Outcome: Progressing Towards Goal 
  
Problem: Injury - Risk of, Adverse Drug Event Goal: *Absence of adverse drug events Outcome: Progressing Towards Goal 
  
Problem: Ventilator Management Goal: *Adequate oxygenation and ventilation Outcome: Resolved/Met Problem: Ventilator Management Goal: *Patient maintains clear airway/free of aspiration Outcome: Resolved/Met Problem: Ventilator Management Goal: *Absence of infection signs and symptoms Outcome: Resolved/Met

## 2020-03-08 NOTE — ROUTINE PROCESS
Taking ice chips without difficulty (note PTA pt has a slight droop to right side of mouth per family this is old) also pt does not have a lot of teeth but smile is normal symmetrical speech is clear

## 2020-03-08 NOTE — PROGRESS NOTES
Bedside and Verbal shift change report given to [id number] Christie Gallegos (oncoming nurse) by Silke Manjarrez (offgoing nurse). Report included the following information SBAR, Kardex and MAR.

## 2020-03-08 NOTE — PROGRESS NOTES
Inpatient Daily Progress Note Subjective:  
V[place]ola A Roots  Pain is well controlled. has not had nausea has not vomiting has passed flatus has not had a bowel movement Objective:  
 
 
Physical Exam: 
Visit Vitals /78 Pulse 86 Temp 100 °F (37.8 °C) Resp 22 Ht 5' 4\" (1.626 m) Wt 91.1 kg (200 lb 13.4 oz) Comment: with wound vac on bed  
LMP 11/25/2012 (Exact Date) SpO2 100% BMI 34.47 kg/m² Gen: Well developed, well nourished 62 y.o. female in no acute distress Resp: clear to auscultation bilaterally, no wheezes rhonchi or rales, excursions normal and symmetrical 
Cardio: Regular rate and rhythm, no murmurs, clicks, gallops or rubs, no edema Abdomen: soft, nontender, nondistended, normoactive bowel sounds, no hernias Psych: alert and oriented to person, place and time Recent Results (from the past 12 hour(s)) GLUCOSE, POC Collection Time: 03/08/20 12:32 AM  
Result Value Ref Range Glucose (POC) 146 (H) 70 - 110 mg/dL MAGNESIUM Collection Time: 03/08/20  4:20 AM  
Result Value Ref Range Magnesium 2.0 1.6 - 2.6 mg/dL METABOLIC PANEL, COMPREHENSIVE Collection Time: 03/08/20  4:20 AM  
Result Value Ref Range Sodium 148 (H) 136 - 145 mmol/L Potassium 3.0 (L) 3.5 - 5.5 mmol/L Chloride 113 (H) 100 - 111 mmol/L  
 CO2 31 21 - 32 mmol/L Anion gap 4 3.0 - 18 mmol/L Glucose 147 (H) 74 - 99 mg/dL BUN 13 7.0 - 18 MG/DL Creatinine 0.67 0.6 - 1.3 MG/DL  
 BUN/Creatinine ratio 19 12 - 20 GFR est AA >60 >60 ml/min/1.73m2 GFR est non-AA >60 >60 ml/min/1.73m2 Calcium 7.8 (L) 8.5 - 10.1 MG/DL Bilirubin, total 0.5 0.2 - 1.0 MG/DL  
 ALT (SGPT) 19 13 - 56 U/L  
 AST (SGOT) 32 10 - 38 U/L Alk. phosphatase 58 45 - 117 U/L Protein, total 5.2 (L) 6.4 - 8.2 g/dL Albumin 1.6 (L) 3.4 - 5.0 g/dL Globulin 3.6 2.0 - 4.0 g/dL A-G Ratio 0.4 (L) 0.8 - 1.7 PHOSPHORUS Collection Time: 03/08/20  4:20 AM  
Result Value Ref Range Phosphorus 2.4 (L) 2.5 - 4.9 MG/DL  
CBC WITH AUTOMATED DIFF Collection Time: 03/08/20  4:20 AM  
Result Value Ref Range WBC 10.9 4.6 - 13.2 K/uL  
 RBC 3.46 (L) 4.20 - 5.30 M/uL HGB 9.2 (L) 12.0 - 16.0 g/dL HCT 29.7 (L) 35.0 - 45.0 % MCV 85.8 74.0 - 97.0 FL  
 MCH 26.6 24.0 - 34.0 PG  
 MCHC 31.0 31.0 - 37.0 g/dL  
 RDW 16.7 (H) 11.6 - 14.5 % PLATELET 116 969 - 794 K/uL MPV 11.8 9.2 - 11.8 FL  
 NEUTROPHILS 80 (H) 42 - 75 % BAND NEUTROPHILS 7 (H) 0 - 5 % LYMPHOCYTES 12 (L) 20 - 51 % MONOCYTES 1 (L) 2 - 9 % EOSINOPHILS 0 0 - 5 % BASOPHILS 0 0 - 3 %  
 ABS. NEUTROPHILS 9.5 (H) 1.8 - 8.0 K/UL  
 ABS. LYMPHOCYTES 1.3 0.8 - 3.5 K/UL  
 ABS. MONOCYTES 0.1 0 - 1.0 K/UL  
 ABS. EOSINOPHILS 0.0 0.0 - 0.4 K/UL  
 ABS. BASOPHILS 0.0 0.0 - 0.06 K/UL  
 DF MANUAL PLATELET COMMENTS ADEQUATE PLATELETS    
 RBC COMMENTS TARGET CELLS 1+ RBC COMMENTS POLYCHROMASIA 1+ CALCIUM, IONIZED Collection Time: 03/08/20  4:20 AM  
Result Value Ref Range Ionized Calcium 1.04 (L) 1.12 - 1.32 MMOL/L  
GLUCOSE, POC Collection Time: 03/08/20  5:43 AM  
Result Value Ref Range Glucose (POC) 161 (H) 70 - 110 mg/dL Assessment:  
 
Jamison Lawson is a 62 y.o. 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   
 
General:  in no apparent distress Eyes:  conjunctivae and sclerae normal, pupils equal, round, reactive to light Throat & Neck: no erythema or exudates noted and neck supple and symmetrical; no palpable masses Lungs:   clear to auscultation bilaterally Heart:  Regular rate and rhythm Abdomen:   Non distended. BS still quiet. Ostomy viable. No issues Extremities: extremities normal, atraumatic, no cyanosis or edema Skin: Normal.  
   
Plan:  
 
-Continue current medications Seems to be making some progress. Await gi function return Perhaps consider reglan and a dulcolax suppository Simran Lopez MD

## 2020-03-09 NOTE — HOME CARE
Patient active to [facility] for SN, PT, and OT. Resumption of care orders needed prior to discharge. [facility] will continue to follow. Erin Abreu LPN  
[facility] Liaison [phone]

## 2020-03-09 NOTE — PROGRESS NOTES
Respiratory Care Assessment for Bronchial hygiene or Lung Expansion Therapy Patient  Martinez Sinha     62 y.o.   female     3/9/2020  4:59 PM 
Patient Active Problem List  
Diagnosis Code  Diabetic neuropathy associated with type 2 diabetes mellitus (HCC) E11.40  Venous insufficiency I87.2  Obesity, Class I, BMI 30-34.9 E66.9  Chronic back pain M54.9, G89.29  
 Generalized osteoarthritis of multiple sites M15.9  Bipolar affective disorder ([facility] F31.9  Abnormally low high density lipoprotein (HDL) cholesterol with hypertriglyceridemia E78.6, E78.1  Diastolic dysfunction without heart failure I51.89  Chronic obstructive pulmonary disease (COPD) ([facility] J44.9  Hypertensive heart disease without heart failure I11.9  Depression F32.9  History of back injury Z87.828  Type 2 diabetes mellitus with diabetic neuropathy, with long-term current use of insulin ([facility]) E11.40, Z79.4  Anxiety F41.9  Wears glasses Z97.3  History of hepatitis C Z86.19  
 Sickle cell trait ([facility]) D57.3  History of acute renal failure Z87.448  Hypothyroidism E03.9  Recurrent genital herpes A60.00  Sarcoidosis D86.9  Abscess of right arm L02.413  Hypoxemia requiring supplemental oxygen R09.02, Z99.81  
 Abnormal nuclear stress test R94.39  
 Cellulitis and abscess of hand L03.119, L02.519  
 Cellulitis and abscess of foot L03.119, L02.619  
 Cellulitis of arm, right L03. 113  
 Olecranon bursitis of right elbow M70.21  
 Contusion of left elbow S50. 02XA  Intravenous drug user F19.90  Right Achilles tendinitis M76.61  
 History of penicillin allergy Z88.0  Falls frequently R29.6  Gastroesophageal reflux disease with hiatal hernia K21.9, K44.9  Memory difficulty R41.3  Mixed connective tissue disease ([facility] M35.1  Impaired mobility and ADLs Z74.09  
 Hyperuricemia E79.0  History of vitamin D deficiency Z86.39  
 Urge urinary incontinence N39.41  
  Spinal cord compression ([facility]) G95.20  Compression fracture of body of thoracic vertebra ([facility]) S22.000A  Status post laminectomy with spinal fusion Z98.1  Acute blood loss as cause of postoperative anemia D62  
 History of fracture due to fall Z87.81  Chronic respiratory failure with hypoxia ([facility]) J96.11  
 Cellulitis of right forearm L03. 113  
 Gout M10.9  Menopause Z78.0  Long term current use of aspirin Z79.82  
 Opioid dependence ([facility]) F11.20  Tobacco use disorder F17.200  Sepsis ([facility] A41.9  Perforated viscus R19.8  Septic shock ([facility]) A41.9, R65.21 ABG: 
Date:3/9/2020 No results found for: PH, PHI, PCO2, PCO2I, PO2, PO2I, HCO3, HCO3I, FIO2, FIO2I Chest X-ray: 
Date:3/9/2020 Results from [facility] [place] Encounter encounter on 02/29/20 XR CHEST PORT Narrative EXAM: CHEST  CPT CODE: 70520 CLINICAL INDICATION/HISTORY: Intubated. COMPARISON: 7 March 2020. TECHNIQUE: Single AP portable view of chest at 0416. FINDINGS: There is underpenetrated technique in the apices are partially 
obscured by the patient's head. There is a nasogastric tube with the tip below 
the diaphragm and off the bottom of the image at the previously noted 
endotracheal tube is not definitely seen. There is a right internal jugular 
central venous catheter with tip in the distal superior vena cava. Hazy 
increasing opacity is seen in both lung bases with obscuration hemidiaphragms as 
well as increased hilar interstitial prominence, most consistent with 
interstitial infiltrate/edema and basilar effusions and atelectasis. The 
cardiomediastinal silhouette is normal.  The bones and soft tissues are 
unremarkable except for posterior fusion hardware at the mid thoracic spine, 
unchanged. Impression IMPRESSION: An endotracheal tube is not definitely seen but is somewhat underpenetrated 
study. Persistent bilateral basilar infiltrates edema and probable 
effusion/atelectasis. Lab Test: 
JSSV:7/1/4250 WBC:  
Lab Results Component Value Date/Time WBC 9.2 03/09/2020 05:30 AM  
HGB:  
Lab Results Component Value Date/Time HGB 8.6 (L) 03/09/2020 05:30 AM  
 PLTS:  
Lab Results Component Value Date/Time PLATELET 197 45/28/2996 05:30 AM  
 
 
SaO2%/flow: @LAQDAIY5(3)@ Vital Signs:    
Patient Vitals for the past 8 hrs: 
 Temp Pulse Resp BP SpO2  
03/09/20 1530  92  147/70 97 % 03/09/20 1430  92 23 133/68 99 % 03/09/20 1400  96 21 158/76 96 % 03/09/20 1330  98 21 146/81 98 % 03/09/20 1300  96 24 125/75 98 % 03/09/20 1230  92 (!) 32 126/71 97 % 03/09/20 1200 (!) 100.8 °F (38.2 °C) 96 22 161/69 97 % 03/09/20 1130  95 20 156/73 98 % 03/09/20 1050     100 % 03/09/20 1030  89 26 143/70 99 % 03/09/20 1000  96 22 152/77 100 % 03/09/20 0900  91 22 152/77 100 % RA/O2 flow/device: NC First Inital Assesment:    
[x]Yes []No  
Reevaluation/Reassessment:   
[]Yes [x]No  
 
CHART REVIEW Points 0 X 1 X 2 X 3 X 4 X Points Pulmonary History Smoking History (1) none  Recent Smoking History <1 PPD  Recent Smoking History >1 PPD  Pulmonary Disease or Impairment  Severe Pulmonary Disease  3 Surgical History No Surgery  General Surgery  Lower Abdominal  Thoracic or Upper Abdominal  Thoracic & Pulmonary Disease  3 CXR Clear or not indicated  Chronic changes or CXR Pending  Infiltrate, atelectasis or pleural effusions  Infiltrates in more than 1lobe  Infiltrates +atelectasis +/or pleural effusions  2 PATIENT ASSESSMENT  
 0 X 1 X 2 X 3 X 4 X Points Respiratory Pattern Regular pattern RR 12-20  Increased RR 21-27   Mild Dyspnea at rest, irregular pattern RR 28-32  Moderate Dyspnea at rest, Use of accessory muscles, RR 33-36  Severe Dyspnea, Use of accessory muscles RR >36  2 Mental Status Alert Oriented cooperative  Confused, Follows commands  Lethargic, Does not follow commands  Obtunded  Unresponsive  1 Breath Sounds Clear  Decreased Unilaterally  Decreased Bilaterally  Mild Scattered wheezing or Crackles in bases  Severe Wheezing or rhonchi  2 Cough Strong dry NPC  Strong Productive  Weak NPC  Weak productive or weak with rhonchi  No cough or may require suctioning  2 Level of Activity Ambulatory  Ambulatory with assistance  Temporarily Non-ambulatory  Non-Ambulatory, able to position self  Unable to position self, confined to bed  2 Total Points/Score:   17 Specific Intervention Chart(EZPAP with nebs) Bronchial Hygiene/Secretion Clearance:   
[x]EZPAP []Rotation bed with vibration []CPT with percussor []CPT via vest  
[]Oscillastiang positive pressure expiratory device Lung Expansion:   
[]Incentive Spirometer w/RT visits []Incentive Spirometer w/nursing   
[x]EZPAP *Suctioning:   
[]Nasal Tracheal []Tracheal   
 *suctioning will be ordered and done PRN with an associated frequency such as QID/PRN based on score Other:   
Care Plan Level # Score Modality Frequency Comment Level 1 >17 EZPAP Q4WA With nebs. VEST is CONTRAINDICATED due to recent abdominal surgery Level 2 14-17 - - Level 3 10-13 - - Level 4 1-9 - -   
 
BRONCHIAL HYGIENE SCORING AND FREQUENCY GUIDELINES Frequency Indications/Findings Level # Q4 ATC Copious secretions, SOB, unable to sleep 1 QID & PRN at night Moderate amounts of secretions 2  
TID or Q6 wa Small amounts of secretions and poor cough: recent history of secretions 3 BID or Q8 wa Unable to deep breathe and cough effectively 4 Comments:  CPT with VEST is CONTRAINDICATED due to recent abdominal surgery Respiratory Therapist: Wilfredo Hodgkin, RT

## 2020-03-09 NOTE — PROGRESS NOTES
[facility] Progress Note Patient: Miesha Bush Age: 62 y.o. : 1955 MR#: [id number] SSN: [id number] Date: 3/9/2020 ICU transfer. Admitted on . Please refer to H&P . Emergently intubated in ED for airway protection secondary to acute sigmoid colon perforation s/p emergent ex-lap with drainage of intraabdominal stool and collection . 3/01 washout and abdominal closure. Extubated on 3/07 Subjective:  
Minimal verbal communication. Tracking with eyes. NAD. PT in the room for evaluation and treatment. NGT in place. Assessment/Plan: 1. Acute on chronic respiratory failure/COPD. Home oxygen 3LNC 2. Acute sigmoid colon perforation with peritonitis s/p emergent ex-lap with drainage of intraabdominal stool and collections; sigmoid colectomy . Subsequent washout and abdominal closure with wound vac, colostomy 3/01 3. Septic shock d/t peritonitis and UTI. Urine culture E.coli 4. Bilateral PNA. Sputum cx 3/01 Moraxella catarrhalis 5. Staph bacteremia blood cx 3/03. GNR bacteremia  6. Hypokalemia 7. Hypernatremia 8. Paroxymal aflutter/afib with RVR. Currently NSR.  
9. Elevated troponin. MI ruled out demand ischemia 10. ELIF. Likely pre-renal. Resolved. 11. History of sarcoidosis with chronic steroid use 12. Pulmonary HTN 
13. History of cocaine and heroin abuse. On methadone. 14. Paraparesis. Wheelchair. History of spine thoracic laminectomy T6-T11 with fusion secondary to acute compression of T9-T11 and spinal cord compression at T9 and T10 and spinal cord edema with subsequent paraparesis. 2019 
15. History of tobacco abuse 16. Hypothyroid Plan 1. General surgery sign off with recommendations nutrition evaluation and treatment. SLP consult. DC NGT as soon as possible and continue with PO diet. Wound care for wound vac and ostomy care.   
2. Cardiology recommendations continue PO amiodarone, not ideal condidate for [address] [place] [address] d/t recent abdominal surgery and thrombocytopenia. Ok to continue ASA per Dr. Joseph Shay, General Surgery. 3. ID recommendations continue zosyn and vanco. Fluconazole discontinued. Monitor intraabdominal abscess. Obtain US guided midline and remove IJ CVC. Repeat blood cultures x2. Monitor renal function while on abx. 4. Diabetes management consult. POC, SSI, Lantus 5. PT/OT, nutrition, SLP. Wound care consult. CM consult for discharge planning. 6. ASA, protonix, miralax 7. Elevate bilateral arms Additional Notes:   
 
Case discussed with:  [x]Patient  []Family  [x]Nursing  [x]Case Management DVT Prophylaxis:  []Lovenox  []Hep SQ  [x]SCDs  []Coumadin   []On Heparin gtt Objective:  
VS:  
Visit Vitals /75 Pulse 96 Temp (!) 100.8 °F (38.2 °C) Resp 24 Ht 5' 4\" (1.626 m) Wt 88 kg (194 lb 0.1 oz) LMP 2012 (Exact Date) SpO2 98% BMI 33.30 kg/m² Tmax/24hrs: Temp (24hrs), Av.4 °F (37.4 °C), Min:98.9 °F (37.2 °C), Max:100.8 °F (38.2 °C) Intake/Output Summary (Last 24 hours) at 3/9/2020 1332 Last data filed at 3/9/2020 0700 Gross per 24 hour Intake 2020.9 ml Output 1840 ml Net 180.9 ml  
 
 
General:  Alert, NAD Cardiovascular:  RRR Pulmonary:  LSC throughout; respiratory effort WNL 
GI:  +BS in all four quadrants, soft, non-tender Extremities:  BUE pitting edema Neuro: alert, no verbal communication Ostomy Abdominal wound vac Labs:   
Recent Results (from the past 24 hour(s)) POTASSIUM Collection Time: 20  4:30 PM  
Result Value Ref Range Potassium 3.5 3.5 - 5.5 mmol/L  
GLUCOSE, POC Collection Time: 20  5:27 PM  
Result Value Ref Range Glucose (POC) 119 (H) 70 - 110 mg/dL Miguel Angel Rear Collection Time: 20 12:05 AM  
Result Value Ref Range Vancomycin,trough 15.2 10.0 - 20.0 ug/mL Reported dose date:  Reported dose time: 1200  Reported dose: 1000 MG UNITS  
GLUCOSE, POC  
 Collection Time: 03/09/20 12:37 AM  
Result Value Ref Range Glucose (POC) 103 70 - 110 mg/dL GLUCOSE, POC Collection Time: 03/09/20  5:29 AM  
Result Value Ref Range Glucose (POC) 122 (H) 70 - 110 mg/dL CBC WITH AUTOMATED DIFF Collection Time: 03/09/20  5:30 AM  
Result Value Ref Range WBC 9.2 4.6 - 13.2 K/uL  
 RBC 3.23 (L) 4.20 - 5.30 M/uL HGB 8.6 (L) 12.0 - 16.0 g/dL HCT 27.8 (L) 35.0 - 45.0 % MCV 86.1 74.0 - 97.0 FL  
 MCH 26.6 24.0 - 34.0 PG  
 MCHC 30.9 (L) 31.0 - 37.0 g/dL  
 RDW 17.0 (H) 11.6 - 14.5 % PLATELET 017 417 - 881 K/uL MPV 10.7 9.2 - 11.8 FL  
 NEUTROPHILS 79 (H) 42 - 75 % BAND NEUTROPHILS 8 (H) 0 - 5 % LYMPHOCYTES 9 (L) 20 - 51 % MONOCYTES 4 2 - 9 % EOSINOPHILS 0 0 - 5 % BASOPHILS 0 0 - 3 %  
 ABS. NEUTROPHILS 8.0 1.8 - 8.0 K/UL  
 ABS. LYMPHOCYTES 0.8 0.8 - 3.5 K/UL  
 ABS. MONOCYTES 0.4 0 - 1.0 K/UL  
 ABS. EOSINOPHILS 0.0 0.0 - 0.4 K/UL  
 ABS. BASOPHILS 0.0 0.0 - 0.06 K/UL  
 DF MANUAL PLATELET COMMENTS ADEQUATE PLATELETS    
 RBC COMMENTS ANISOCYTOSIS 1+ 
    
 RBC COMMENTS POLYCHROMASIA 1+ MAGNESIUM Collection Time: 03/09/20  5:30 AM  
Result Value Ref Range Magnesium 2.0 1.6 - 2.6 mg/dL METABOLIC PANEL, COMPREHENSIVE Collection Time: 03/09/20  5:30 AM  
Result Value Ref Range Sodium 149 (H) 136 - 145 mmol/L Potassium 2.9 (LL) 3.5 - 5.5 mmol/L Chloride 113 (H) 100 - 111 mmol/L  
 CO2 30 21 - 32 mmol/L Anion gap 6 3.0 - 18 mmol/L Glucose 106 (H) 74 - 99 mg/dL BUN 10 7.0 - 18 MG/DL Creatinine 0.59 (L) 0.6 - 1.3 MG/DL  
 BUN/Creatinine ratio 17 12 - 20 GFR est AA >60 >60 ml/min/1.73m2 GFR est non-AA >60 >60 ml/min/1.73m2 Calcium 8.0 (L) 8.5 - 10.1 MG/DL Bilirubin, total 0.5 0.2 - 1.0 MG/DL  
 ALT (SGPT) 18 13 - 56 U/L  
 AST (SGOT) 27 10 - 38 U/L Alk. phosphatase 54 45 - 117 U/L Protein, total 5.4 (L) 6.4 - 8.2 g/dL Albumin 1.5 (L) 3.4 - 5.0 g/dL Globulin 3.9 2.0 - 4.0 g/dL A-G Ratio 0.4 (L) 0.8 - 1.7 PHOSPHORUS Collection Time: 03/09/20  5:30 AM  
Result Value Ref Range Phosphorus 3.2 2.5 - 4.9 MG/DL  
GLUCOSE, POC Collection Time: 03/09/20  8:55 AM  
Result Value Ref Range Glucose (POC) 166 (H) 70 - 110 mg/dL GLUCOSE, POC Collection Time: 03/09/20 11:54 AM  
Result Value Ref Range Glucose (POC) 275 (H) 70 - 110 mg/dL Signed By: Margret Gómez NP March 9, 2020

## 2020-03-09 NOTE — PROGRESS NOTES
Patient seen and examined. She is doing much better. I reviewed all the events that happened over the weekend\ She has been extubated and she is on nasal cannula. She is on tube feeds as well as some p.o. around the NG tube Her colostomy is finally functioning Plan: 
Appreciate ICU and medicine teams management Appreciate nutrition evaluation and treatment When she passed SLP I would recommend to DC the NG tube as soon as possible and continue with the p.o. diet Wound care with the wound VAC and colostomy care No need for any general surgery issues currently I will sign off for now but please call me for any questions.

## 2020-03-09 NOTE — PROGRESS NOTES
Infectious Disease progress Note Reason: sepsis, gram negative bacteremia, staph epidermidis bacteremia Current abx Prior abx Pip/tazo since 3/1 Vancomycin since 3/6/20 Cefepime, metronidazole 2/29-3/1 Vancomycin 2/29-3/2/20 Lines:  
 
 
Assessment : 
 
62 y.o. 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 Clinical presentation c/w septic shock (POA) due to prevotella bloodstream infection (positive blood culture 2/29), sigmoid perforation, secondary peritonitis s/p emergent Ex lap on 2/29/20. Intra operatively found to have sigmoid colon perforation, 2/2 constipation with large amount of stool burden spilling into the peritoneum per Dr. Carolina Khan. S/p Re-exploratory laparotomy, drainage of intra-abdominal stool collection, descending colectomy with mobilization of the splenic flexure, and transverse colostomy on 3/1. Significant pyuria on UA 2/29. Urine culture >100,000 e.coli suggestive of probable cystitis. Acute on chronic respiratory failure - likely due to sepsis. Sputum culture 3/1- moraxella (beta lactamase positive). Diffuse pulmonary opacities concerning for aspiration pneumonia. Single positive blood culture for epidermidis on 3/3 seemed likely contaminant since patient was improving off vancomycin. However, Blood culture 3/5: Positive for Staphylococcus epidermidis. At this time it is difficult to determine the significance of staph epidermidis bacteremia. Management complicated by inability to remove central line since patient has difficult IV access and unable to obtain peripheral IV line. Hence, started vancomycin and repeat blood cultures. abx management complicated due to h/o pcn allergy. Currently tolerating pip/tazo without difficulties. Clinically better. Off pressors. Negative fungitell argues against fungemia. Recommendations: 1. continue pip/tazo, vancomycin 2. D/c fluconazole 3. Monitor for intra abdominal abscess. 4. F/u surgery recommendations 5. Obtain US guided midline and remove right IJ CVC 6. Repeat blood cultures x 2.  
7. Will monitor renal function since pip/tazo, vancomycin combination carries high risk of nephrotoxicity Above plan was discussed in details with  RN, dr. Adilia Reynolds. Please call me if any further questions or concerns. Will continue to participate in the care of this patient. HPI: 
 
Denies significant abdominal pain. No nausea or vomiting. 
 
 
home Medication List  
 Details  
roflumilast (DALIRESP) 250 mcg tab Take 250 mcg by mouth daily. Qty: 30 Tab, Refills: 0  
  
ARIPiprazole (ABILIFY) 10 mg tablet Take 1 Tab by mouth. aspirin-calcium carbonate 81 mg-300 mg calcium(777 mg) tab Take 1 Tab by mouth. divalproex DR (DEPAKOTE) 250 mg tablet Take 1 Tab by mouth. doxycycline (MONODOX) 100 mg capsule   
  
gabapentin (NEURONTIN) 300 mg capsule Take 1 Cap by mouth. ipratropium (ATROVENT) 0.02 % soln   
  
methylPREDNISolone (MEDROL DOSEPACK) 4 mg tablet   
  
oxyCODONE-acetaminophen (PERCOCET) 5-325 mg per tablet Take 1 Tab by mouth. tolterodine (DETROL) 1 mg tablet Take 1 Tab by mouth. traMADol (ULTRAM) 50 mg tablet Take 1 Tab by mouth. traZODone (DESYREL) 100 mg tablet Take 1 Tab by mouth. !! busPIRone (BUSPAR) 15 mg tablet Take 15 mg by mouth two (2) times a day. Indications: repeated episodes of anxiety Qty: 60 Tab, Refills: 2 OXYGEN-AIR DELIVERY SYSTEMS 3 L/min by Nasal route continuous. [employer] Choice O2  
  
albuterol-ipratropium (DUO-NEB) 2.5 mg-0.5 mg/3 ml nebu 3 mL by Nebulization route every six (6) hours as needed for Wheezing. Qty: 30 Nebule, Refills: 1  
  
!! busPIRone (BUSPAR) 10 mg tablet Take 10 mg by mouth three (3) times daily. Indications: repeated episodes of anxiety  
  
cholecalciferol (VITAMIN D3) (1000 Units /25 mcg) tablet Take 1,000 Units by mouth two (2) times a day. levothyroxine (SYNTHROID) 100 mcg tablet Take 100 mcg by mouth Daily (before breakfast). insulin glargine (LANTUS U-100 INSULIN) 100 unit/mL injection 20 Units by SubCUTAneous route Daily (before breakfast). famotidine (PEPCID) 20 mg tablet Take 20 mg by mouth nightly. aspirin 81 mg chewable tablet Take 1 Tab by mouth daily (with breakfast). Indications: stroke prevention 
Qty: 30 Tab, Refills: 0  
  
docusate sodium (COLACE) 100 mg capsule Take 1 Cap by mouth daily (after breakfast). Indications: constipation 
Qty: 30 Cap, Refills: 0  
  
predniSONE (DELTASONE) 20 mg tablet Take 20 mg by mouth daily (with breakfast). Indications: sarcoidosis Qty: 30 Tab, Refills: 0 Associated Diagnoses: Sarcoidosis  
  
ascorbic acid, vitamin C, (VITAMIN C) 250 mg tablet Take 1 Tab by mouth daily (with breakfast). Qty: 30 Tab, Refills: 0 Associated Diagnoses: Acute blood loss as cause of postoperative anemia  
  
calcium citrate 200 mg (950 mg) tablet Take 1 Tab by mouth two (2) times a day. Indications: post-menopausal osteoporosis prevention 
Qty: 60 Tab, Refills: 0 Associated Diagnoses: Status post laminectomy with spinal fusion  
  
ferrous sulfate 325 mg (65 mg iron) tablet Take 1 Tab by mouth daily (with breakfast). Qty: 30 Tab, Refills: 0 Associated Diagnoses: Acute blood loss as cause of postoperative anemia  
  
acetaminophen (TYLENOL) 325 mg tablet Take 2 Tabs by mouth every four (4) hours as needed for Pain. Indications: pain 
Qty: 60 Tab, Refills: 0 Associated Diagnoses: Status post laminectomy with spinal fusion  
  
brief disposable (ADULT) misc by Does Not Apply route. Qty: 1 Package, Refills: 0 Associated Diagnoses: Urge urinary incontinence  
  
methadone (DOLOPHINE) 5 mg tablet Take 4 Tabs by mouth daily. Max Daily Amount: 20 mg. 
Qty: 10 Tab, Refills: 0 Associated Diagnoses: History of back injury  
  
polyethylene glycol (MIRALAX) 17 gram/dose powder Take 17 g by mouth daily. 1 tablespoon with 8 oz of water daily 
Qty: 289 g, Refills: 0  
  
umeclidinium-vilanterol (ANORO ELLIPTA) 62.5-25 mcg/actuation inhaler Take 1 Puff by inhalation daily. Qty: 1 Inhaler, Refills: 0 BD INSULIN SYRINGE ULTRA-FINE 1 mL 31 gauge x 5/16 syrg   
  
rosuvastatin (CRESTOR) 5 mg tablet TAKE ONE TABLET NIGHTLY Qty: 90 Tab, Refills: 3  
  
albuterol (PROAIR HFA) 90 mcg/actuation inhaler INHALE 2 PUFFS EVERY 4 HOURS AS NEEDED FOR WHEEZING Qty: 1 Inhaler, Refills: 5  
  
oxybutynin (DITROPAN) 5 mg tablet Take 5 mg by mouth two (2) times a day. allopurinol (ZYLOPRIM) 100 mg tablet Take 100 mg by mouth daily. insulin lispro (HUMALOG) 100 unit/mL injection To be given 15 min before meals: < 150 - None, 151-200 - 2 u, 201-250 - 4 u, 251-300 - 6 u, 301-350 - 8 u, 351-400 - 10 u, >400 - 12 u Qty: 1 Vial, Refills: 0 Associated Diagnoses: Type 2 diabetes mellitus with stage 3 chronic kidney disease, with long-term current use of insulin ([facility])  
  
insulin syringe,safetyneedle 1 mL 30 gauge x 5/16\" syrg As directed Qty: 50 Each, Refills: 0 Nebulizer Accessories Kit Use with nebulizer machine Qty: 1 Kit, Refills: prn  
 Associated Diagnoses: COPD (chronic obstructive pulmonary disease) ([facility])  
  
sertraline (ZOLOFT) 50 mg tablet Take 50 mg by mouth daily. Indications: Bipolar Depression Current Facility-Administered Medications Medication Dose Route Frequency  sodium chloride 3% hypertonic nebulizer soln  4 mL Nebulization BID RT  
 ELECTROLYTE REPLACEMENT PROTOCOL - Potassium Standard Dosing   1 Each Other PRN  
 vancomycin (VANCOCIN) 1,000 mg in 0.9% sodium chloride (MBP/ADV) 250 mL adv  1,000 mg IntraVENous Q12H  hydrocortisone Sod Succ (PF) (SOLU-CORTEF) injection 50 mg  50 mg IntraVENous DAILY  fentaNYL citrate (PF) injection  mcg   mcg IntraVENous Q2H PRN  
 aspirin chewable tablet 81 mg  81 mg Oral DAILY  levothyroxine (SYNTHROID) injection 75 mcg  75 mcg IntraVENous DAILY  insulin glargine (LANTUS) injection 16 Units  16 Units SubCUTAneous DAILY  polyethylene glycol (MIRALAX) packet 17 g  17 g Oral DAILY  multivit-folic acid-herbal 028 (WELLESSE PLUS) oral liquid 30 mL  30 mL Per NG tube DAILY  fluconazole (DIFLUCAN) 400mg/200 mL IVPB (premix)  400 mg IntraVENous Q24H  piperacillin-tazobactam (ZOSYN) 4.5 g in 0.9% sodium chloride (MBP/ADV) 100 mL MBP  4.5 g IntraVENous Q6H  
 acetaminophen (TYLENOL) solution 650 mg  650 mg Per NG tube Q4H PRN  
 amiodarone (NEXTERONE) 360 mg in dextrose 200 mL (1.8 mg/mL) infusion  0.5-1 mg/min IntraVENous TITRATE  diphenhydrAMINE (BENADRYL) injection 25 mg  25 mg IntraVENous Q4H PRN  
 sodium chloride (NS) flush 5-10 mL  5-10 mL IntraVENous PRN  
 sodium chloride (NS) flush 5-40 mL  5-40 mL IntraVENous PRN  pantoprazole (PROTONIX) 40 mg in 0.9% sodium chloride 10 mL injection  40 mg IntraVENous Q12H  
 albuterol-ipratropium (DUO-NEB) 2.5 MG-0.5 MG/3 ML  3 mL Nebulization Q4H RT  
 albuterol (ACCUNEB) nebulizer solution 1.25 mg  1.25 mg Nebulization Q6H PRN  
 budesonide (PULMICORT) 500 mcg/2 ml nebulizer suspension  500 mcg Nebulization BID RT  
 insulin lispro (HUMALOG) injection   SubCUTAneous Q6H  
 glucose chewable tablet 16 g  4 Tab Oral PRN  
 glucagon (GLUCAGEN) injection 1 mg  1 mg IntraMUSCular PRN  
 dextrose (D50W) injection syrg 12.5-25 g  25-50 mL IntraVENous PRN Allergies: Moxifloxacin; Pcn [penicillins]; and Sulfa (sulfonamide antibiotics) Temp (24hrs), Av.1 °F (37.3 °C), Min:98.9 °F (37.2 °C), Max:100.2 °F (37.9 °C) Visit Vitals /77 Pulse 91  
 Temp 100.2 °F (37.9 °C) Resp 22 Ht 5' 4\" (1.626 m) Wt 88 kg (194 lb 0.1 oz) LMP 11/25/2012 (Exact Date) SpO2 100% BMI 33.30 kg/m² ROS: 12 point review of systems obtained in details pertinent positive as mentioned in history of present illness, otherwise negative Physical Exam: 
 
General:   Laying on the bed, alert awake oriented x3, in no apparent distress. Head:  Normocephalic, without obvious abnormality, atraumatic. Eyes:  Conjunctivae/corneas clear. PERRL, Nose: Nares normal. Septum midline. Mucosa normal. No drainage or sinus tenderness. Throat: Lips, mucosa, and tongue normal. Teeth and gums normal.  
Neck: Supple, symmetrical, trachea midline, no adenopathy, no carotid bruit and no JVD. Lungs:   Symmetrical chest rise; good AE bilat; course throughout; no wheezes/rales noted. Heart:  RRR, S1, S2 normal, no m/r/g Abdomen:   Soft,  no significant tenderness. Non-distended. Non-rigid. +midline surgical incision, dressing c/d/i. Present bowel sounds. No masses,  No organomegaly. Extremities: Extremities normal, atraumatic, no cyanosis or edema. Pulses: 2+ and symmetric all extremities. Skin: Skin color, texture, turgor normal. No rashes or lesions Neurologic:  Alert awake oriented x3. No gross motor or sensory deficits noted Labs: Results:  
Chemistry Recent Labs 03/09/20 
0530 03/08/20 
1630 03/08/20 
0420 03/07/20 
1800 *  --  147* 148* *  --  148* 148*  
K 2.9* 3.5 3.0* 2.8*  
*  --  113* 113* CO2 30  --  31 32 BUN 10  --  13 15 CREA 0.59*  --  0.67 0.62  
CA 8.0*  --  7.8* 7.6* AGAP 6  --  4 3 BUCR 17  --  19 24* AP 54  --  58 61  
TP 5.4*  --  5.2* 4.9* ALB 1.5*  --  1.6* 1.7*  
GLOB 3.9  --  3.6 3.2 AGRAT 0.4*  --  0.4* 0.5* CBC w/Diff Recent Labs 03/09/20 
0530 03/08/20 
0420 03/07/20 
0500 WBC 9.2 10.9 9.2  
RBC 3.23* 3.46* 2.78* HGB 8.6* 9.2* 7.5* HCT 27.8* 29.7* 23.4*  
 150 83* GRANS 79* 80* 80* LYMPH 9* 12* 5* EOS 0 0 0 Microbiology No results for input(s): CULT in the last 72 hours. RADIOLOGY: 
 
All available imaging studies/reports in [facility] for this admission were reviewed Dr. Luh Arnold, Infectious Disease Specialist 
[phone] March 9, 2020 
8:38 AM

## 2020-03-09 NOTE — PROGRESS NOTES
Cardiology progress note CARDIOLOGY PROGRESS NOTE 
RECS: 
 
 
 
1. Paroxymal Atrial flutter/atrial fibrillation  with RVR- maintaining NSR-  amiodarone drip changed to po. Not ideal candidate for anticoagulation due to recent abdominal surgery and thrombocytopenia- continue asa Ok per Dr. Serenity Estes 
2. Acute respiratory Failure- intubated. Extubated 3/7/20 - on O2 by Nasal canula 3. Acute Sigmoid Colon Perforation - s/p Emergent Ex-Lap with drainage of intraabdominal stool and collections; sigmoid colectomy with Dr. Rafa Veliz on 02/29/20.sigmoid perforation, secondary peritonitis  S/p Re-exploratory laparotomy, drainage of intra-abdominal stool collection, descending colectomy with mobilization of the splenic flexure, and transverse colostomy on 3/1. 
4. Septic Shock-improved  on antibiotics managed by [facility] and ID  
5. Hypotension- in the setting of #3 resolved 6. Acute on chronic diastolic heart failure-s/p lasix 20 iv  x1 good diuresis 7. Hx sarcoidosis  
8. Hx of abnormal Dobutamine  stress test- 11/2016  
9. Hx of Tobacco abuse 10. Pulmonary hypertension with PAP 80 mmHg on recent echo  
11. Abnormal TSH- medications per [facility] care 12. Hypokalemia- replete by [facility] 
  
Maintaining NSR - continue amiodarone. Start oral anticoagulation when possible. Continue supportive care 
  
  
11/2016  
2. Dobutamine stress test was done, reported EF of 44% with a small reversible inferior wall defect.-medically reated 
  
  
       
02/29/20 ECHO ADULT FOLLOW-UP OR LIMITED 03/03/2020 3/3/2020  
  Narrative · Dilated right ventricle. Reduced systolic function. Abnormal right  
ventricular septal motion. Systolic flattening of interventricular septum  
consistent with right ventricle pressure overload. · Moderate tricuspid valve regurgitation is present. · Severely elevated central venous pressure (15+ mmHg); IVC diameter is  
larger than 21 mm and collapses less than 50% with respiration. · Severe pulmonary hypertension. · Pulmonary arterial systolic pressure is 18.4 mmHg · Normal cavity size, wall thickness and systolic function (ejection  
fraction normal). Estimated left ventricular ejection fraction is 55 -  
60%. · Dilated right atrium. 
   
    Signed by: Andrew Nunez MD  
 
 
 
ASSESSMENT: 
Hospital Problems  Date Reviewed: 2/28/2020 Codes Class Noted POA Perforated viscus ICD-10-CM: R19.8 ICD-9-CM: 799.89  2/29/2020 Unknown Septic shock (HCC) ICD-10-CM: A41.9, R65.21 ICD-9-CM: 038.9, 785.52, 995.92  2/29/2020 Unknown SUBJECTIVE: 
Patient alert. No c/o dyspnea. Weak. No distress noted OBJECTIVE: 
 
VS:  
Visit Vitals /70 Pulse 89 Temp 100.2 °F (37.9 °C) Resp 26 Ht 5' 4\" (1.626 m) Wt 88 kg (194 lb 0.1 oz) SpO2 100% BMI 33.30 kg/m² Intake/Output Summary (Last 24 hours) at 3/9/2020 1155 Last data filed at 3/9/2020 0700 Gross per 24 hour Intake 2200.9 ml Output 1840 ml Net 360.9 ml  
 
TELE: normal sinus rhythm General: No acute distress HENT: Normocephalic, atraumatic. Neck :  Supple Cardiac:  Normal S1/S2 Chest/Lungs: harsh breath sound left lower lung. RL lobe diminished Abdomen: Soft Extremities: pedal edema is present Labs: Results:  
   
Chemistry Recent Labs 03/09/20 
0530 03/08/20 
1630 03/08/20 
0420 03/07/20 
1800 *  --  147* 148* *  --  148* 148*  
K 2.9* 3.5 3.0* 2.8*  
*  --  113* 113* CO2 30  --  31 32 BUN 10  --  13 15 CREA 0.59*  --  0.67 0.62  
CA 8.0*  --  7.8* 7.6*  
MG 2.0  --  2.0 2.3 PHOS 3.2  --  2.4* 2.8 AGAP 6  --  4 3 BUCR 17  --  19 24* AP 54  --  58 61  
TP 5.4*  --  5.2* 4.9* ALB 1.5*  --  1.6* 1.7*  
GLOB 3.9  --  3.6 3.2 AGRAT 0.4*  --  0.4* 0.5* CBC w/Diff Recent Labs 03/09/20 
0530 03/08/20 
0420 03/07/20 
0500 WBC 9.2 10.9 9.2  
RBC 3.23* 3.46* 2.78* HGB 8.6* 9.2* 7.5* HCT 27.8* 29.7* 23.4*  
  150 83* GRANS 79* 80* 80* LYMPH 9* 12* 5* EOS 0 0 0 Cardiac Enzymes No results for input(s): CPK, CKND1, SANTOS in the last 72 hours. No lab exists for component: Clarissa Carrillo Coagulation No results for input(s): PTP, INR, APTT, INREXT, INREXT in the last 72 hours. Lipid Panel Lab Results Component Value Date/Time Cholesterol, total 141 09/30/2019 12:00 AM  
 HDL Cholesterol 39 (L) 09/30/2019 12:00 AM  
 LDL, calculated 61 09/30/2019 12:00 AM  
 VLDL, calculated 41 (H) 09/30/2019 12:00 AM  
 Triglyceride 204 (H) 09/30/2019 12:00 AM  
 CHOL/HDL Ratio 3.5 11/06/2016 04:18 AM  
  
BNP No results for input(s): BNPP in the last 72 hours. Liver Enzymes Recent Labs 03/09/20 
0530 TP 5.4* ALB 1.5* AP 54 SGOT 27  
  
Digoxin Thyroid Studies Lab Results Component Value Date/Time TSH 5.11 (H) 03/05/2020 06:25 PM  
    
 
 
 
SOREN SimmonsC supervised I have independently evaluated and examined the patient. All relevant labs and testing data's are reviewed. Care plan discussed and updated after review.  
 
Jeannie Holland MD

## 2020-03-09 NOTE — PROGRESS NOTES
PT orders received, chart reviewed. Pt unable to participate with PT due to pt on continuous bedrest orders. Please remove to participate in therapy. Will f/u later as patient's schedule allows.  Thank you for this referral. 
Jose G Dave, PT, DPT

## 2020-03-09 NOTE — DIABETES MGMT
GLYCEMIC CONTROL PLAN OF CARE Assessment/Recommendations: 
Blood glucose this am 106 mg/dl Noted pt receiving steroids Continue basal and corrective insulin coverage as ordered Will continue inpatient monitoring. Most recent blood glucose values: 
Results for Emiliano Gamble (MRN [id number]) as of 3/9/2020 09:59 Ref. Range 3/8/2020 11:53 3/8/2020 17:27 3/9/2020 00:37 3/9/2020 05:29 3/9/2020 08:55 GLUCOSE,FAST - POC Latest Ref Range: 70 - 110 mg/dL 154 (H) 119 (H) 103 122 (H) 166 (H) Current A1C of 8.2 % is equivalent to average blood glucose of 189_mg/dl over the past 2-3 months. Current hospital diabetes medications:  
Lantus 16 units daily Lispro corrective insulin coverage every 6 hours as needed Previous day's insulin requirements:  
Lantus 16 units Lispro 6 units corrective insulin Home diabetes medications:  Per pta med list 
Lantus 20 units daily before breakfast 
Humalog AC based on her blood sugar reading Diet:   
NPO. Trickle feeds at 10 cc currently. Education:  ____Refer to Diabetes Education Record  
          _x__Education not indicated at this time 
intubated Red Miranda RN CDE Ext [phone]

## 2020-03-09 NOTE — PROGRESS NOTES
Problem: Mobility Impaired (Adult and Pediatric) Goal: *Acute Goals and Plan of Care (Insert Text) Description Physical Therapy Goals Initiated 3/9/2020 and to be accomplished within 7 day(s) 1. Patient will participate in functional maintenance program for PROM/AAROM B LE 3-5 times a week with rehab technician. Prior Level of Function:  
Patient was moderate assistance  for all mobility including bed mobility and sliding board transfers per last PT notes in 1/2020. Pt is nonambulatory. Patient lives alone with 24/7 care with nursing/aides in a single story home 4 steps to enter B handrail assist. Pt is dependent with stairs. She states she has a manual WC that she propels. She also states she has a bedside commode. Outcome: Progressing Towards Goal 
 PHYSICAL THERAPY EVALUATION Patient: Apryl Medrano (65 y.o. female) Date: 3/9/2020 Primary Diagnosis: Septic shock ([facility] [address] [A41.9, R65.21] Perforated viscus [R19.8] Procedure(s) (LRB): 
LAPAROTOMY EXPLORATORY, DESCENDING COLECTOMY, CREATION OF COLOSTOMY (N/A) 8 Days Post-Op Precautions:   Fall(spinal surgery 12/2019) ASSESSMENT : 
PT orders received and patient cleared by nursing to participate with therapy. Patient is a 62 y.o. female admitted to the hospital due to abdominal pain and SOB with emergent ex lap 2/29/2020. Pt was intubated and transferred to the ICU and was extubated 3/7/2020. History of spinal surgery 12/2019 with ARU after hospital and moderate assistance for bed mobility and sliding board transfers. Patient/family/nursing consents to PT evaluation and treatment. Pt has decreased command following. She was drowsy throughout session and unable to follow commands at end of session with nursing present. Pt has decreased PROM of B LE. Pt has no AROM or strength in B LE noted during evaluation. She is total assistance x2 for scooting and rolling. Positioned pt to the right with nursing assistance (pt is on turn team). Pt fatigues very quickly. Pt ended therapy supine in bed with all needs met. Rehab technician educated on functional maintenance program for pt. Patient will benefit from intervention to address the above impairments. Patient's rehabilitation potential is considered to be Guarded Factors which may influence rehabilitation potential include:    
[]         None noted 
[x]         Mental ability/status [x]         Medical condition 
[]         Home/family situation and support systems 
[]         Safety awareness 
[]         Pain tolerance/management 
[]         Other: PLAN : 
Recommendations and Planned Interventions:   
[]           Bed Mobility Training             []    Neuromuscular Re-Education []           Transfer Training                   []    Orthotic/Prosthetic Training 
[]           Gait Training                          []    Modalities [x]           Therapeutic Exercises           []    Edema Management/Control 
[]           Therapeutic Activities            [x]    Family Training/Education 
[x]           Patient Education 
[]           Other (comment): Frequency/Duration: Patient will be followed by physical therapy rehab technician 3-5 times a week Monday through Friday to address goals. Discharge Recommendations: SNF pending progress otherwise LTC or home health with 24/7 assistance Further Equipment Recommendations for Discharge: hospital bed and mechanical lift SUBJECTIVE:  
Patient is nonverbal 
 
OBJECTIVE DATA SUMMARY:  
 
Past Medical History:  
Diagnosis Date  Abnormally low high density lipoprotein (HDL) cholesterol with hypertriglyceridemia Lipid profile (11/6/2016) showed , , HDL 38, LDL 37  Acute blood loss as cause of postoperative anemia 12/12/2019  Anxiety  Bipolar affective disorder ([facility] [address] 12/5/2012  Cellulitis of right forearm 05/04/2017  Chronic back pain  Chronic obstructive pulmonary disease (COPD) ([facility] [address] SOB, on paula O2 Recent admission with psychosis  Chronic respiratory failure with hypoxia ([address] On home oxygen inhalation at 3 LPM via NC  
 Contusion of left elbow 2017  Depression  Diabetic neuropathy associated with type 2 diabetes mellitus ([address]  Diastolic dysfunction without heart failure Stable on diuretics  Falls frequently  Gastroesophageal reflux disease with hiatal hernia  Generalized osteoarthritis of multiple sites  Gout On Allopurinol  History of acute renal failure 2013  History of back injury   
 jumped out of second story window  History of fracture due to fall 2019  
 History of hepatitis C   
 treated  History of penicillin allergy  History of vitamin D deficiency 5/10/2017  Hypertensive heart disease without heart failure Better controlled  Hyperuricemia 2017  Hypothyroidism  Hypoxemia requiring supplemental oxygen 2014  Intravenous drug user 2017  Long term current use of aspirin  Memory difficulty  Menopause  Mixed connective tissue disease ([address] 5/10/2017  Obesity, Class I, BMI 30-34.9  Olecranon bursitis of right elbow 2017  Opioid dependence ([address] On Methadone  Recurrent genital herpes 2013  Right Achilles tendinitis 2017  Sarcoidosis  Sickle cell trait ([address]  Tobacco use disorder  Type 2 diabetes mellitus with diabetic neuropathy, with long-term current use of insulin ([address] HbA1c (2019) = 7.1  Urge urinary incontinence 5/10/2017  Venous insufficiency  Wears glasses Past Surgical History:  
[facility]  SECTION    
 HX CYST REMOVAL Left  Left foot  HX OTHER SURGICAL Left 2017 S/P incision and drainage of the abscess of the left hand and the left foot (2017 - Dr. Evan Oliver. Sae Navarro)  HX THORACIC LAMINECTOMY  12/11/2019 S/P T10, T9, T8 laminectomy; T7, T8, T9, T10, T11, T12 posterior thoracic fusion; segmental spinal instrumentation; K2M Davis type, T7-T8, T11-T12 (12/11/2019 - Dr. Dacia Maguire)  HX TUBAL LIGATION Barriers to Learning/Limitations: yes;  altered mental status (i.e.Sedation, Confusion) Compensate with: Visual Cues, Verbal Cues, and Tactile Cues Home Situation: 
Home Situation Home Environment: Private residence # Steps to Enter: 4(pt was dependent with stairs) Rails to Enter: Yes Hand Rails : Bilateral 
One/Two Story Residence: One story Living Alone: (has 24/7 nursing/aide care) Support Systems: (nursing/aide care 24/7) Patient Expects to be Discharged to[de-identified] Private residence Current DME Used/Available at Home: Wheelchair, Commode, bedside Critical Behavior: 
Neurologic State: Alert;Drowsy Orientation Level: Oriented to person Cognition: Decreased command following;Decreased attention/concentration Safety/Judgement: Fall prevention Psychosocial 
Patient Behaviors: Calm(drowsy) B LE Strength:   
Strength: (B LE 0/5) B LE Tone & Sensation:  
Tone: (decreased tone B LE) Sensation: (pt reports intact to light touch) B LE Range Of Motion: 
AROM: (no active B LE ROM) PROM: Generally decreased, functional(B LE) Posture: 
Posture (WDL): Exceptions to [facility] Posture Assessment: Forward head;Rounded shoulders Functional Mobility: 
Bed Mobility: 
Rolling: Total assistance;Assist x2 Scooting: Total assistance;Assist x2 Transfers: At this time pt is dependent; not performed due to pt's safety Wheelchair Mobility: 
 Not assessed today but pt reports independence for PLOF Ambulation/Gait Training: 
 Nonambulatory Stairs: 
 Dependent at baseline Therapeutic Exercises:  
Performed B LE PROM in all planes 5-10 reps.   
Therex provided throughout session as functional mobility to increase strength and endurance. Pain: 
Pain level pre-treatment: 9/10 buttocks Pain level post-treatment: same Pain Intervention(s) : Medication (see MAR); Rest, Repositioning Response to intervention: Nurse notified, See doc flow Activity Tolerance:  
poor Please refer to the flowsheet for vital signs taken during this treatment. After treatment:  
[]         Patient left in no apparent distress sitting up in chair 
[x]         Patient left in no apparent distress in bed 
[x]         Call bell left within reach [x]   Personal items in reach [x]         Nursing notified Ronit Irizarry 
[]         Caregiver present 
[]         Bed/chair alarm activated 
[x]         SCDs applied COMMUNICATION/EDUCATION:  
[x]         Role of Physical Therapy in the acute care setting. [x]         Fall prevention education was provided. []         Patient/family have participated as able in goal setting and plan of care. []         Patient/family agree to work toward stated goals and plan of care. []         Patient understands intent and goals of therapy, but is neutral about his/her participation. [x]         Patient is unable to participate in goal setting/plan of care: ongoing with therapy staff. 
[]         Out of bed with nursing assistance 3-5 times a day. []         Other: Thank you for this referral. 
Renae Hdz, PT, DPT Time Calculation: 23 mins Eval Complexity: History: HIGH Complexity :3+ comorbidities / personal factors will impact the outcome/ POC Exam:HIGH Complexity : 4+ Standardized tests and measures addressing body structure, function, activity limitation and / or participation in recreation  Presentation: HIGH Complexity : Unstable and unpredictable characteristics  Clinical Decision Making:High Complexity    Overall Complexity:HIGH

## 2020-03-09 NOTE — PROGRESS NOTES
NUTRITION Nutrition Consult: General Nutrition Management & Supplements RECOMMENDATIONS / PLAN:  
 
- Change to bolus regimen of Glucerna 1.5, 300 mL given via pump over 1 hour 4 times daily and provide 200 mL free water flush every 4 hours (after each bolus plus additional 2 flushes). - Provide Prosource BID via NGT. - Monitor diet tolerance and meal intake. Will adjust enteral regimen as needed to meet estimated nutrition needs, accounting for meal intake. - Continue RD inpatient monitoring and evaluation. Goal Enteral Regimen: Glucerna 1.5, 300 mL 4 times daily + 200 mL water flush every 4 hours + Prosource BID to provide:  1800 kcal, 99 gm protein, 90 gm fat, 160 gm CHO, 19 gm fiber, 910 mL free water, 2110 mL total free water, 100% RDIs NUTRITION INTERVENTIONS & DIAGNOSIS:  
 
- Enteral nutrition: modify regimen - Meals/snacks: modified composition, monitor po intake - Vitamin and mineral supplement therapy: multivitamin - Nutrition related medication management: miralax (held) - Collaboration and referral of nutrition care: interdisciplinary rounds Nutrition Diagnosis: Inadequate oral intake related to mentation, recent extubation, altered GI function as evidenced by pt with NGT for feeding while intubated, started on dysphagia diet. Altered GI function related to chronic constipation, narcotic use and critically ill as evidenced by sigmoid colon perforation s/p sigmoid colectomy with postop impaired gut motility. ASSESSMENT:  
 
3/9: Extubated 3/7, NGT keep in place post extubation and tube feeding at goal. Diet started after swallow evaluation this morning, pt lethargic with anticipated poor meal intake- will continue feeds, change to bolus regimen and monitor adequacy of meal intake per MD.  
 
3/7: Tolerating feeds at 30 mL/hr, residual  mL. Receiving metoclopramide & miralax held this morning after large loose stool filled colostomy bag. 3/6: Feeds advanced to 20 mL/hr then rate dropped back down to 10 mL/hr after 200 mL residual. Pt with chronic constipation, hx of narcotic use, partial paralysis and prolonged ileus anticipated per MD. Lantus increased for hyperglycemia, hypernatremia improving. 3/5: Tolerating trickle feeds, hypernatremia noted. 3/4: Minimal out from colostomy with 1100 mL out from NGT- will start trial of trickle feeding and monitor. 3/3: Remains intubated, no nausea/vomiting, no ostomy function. No output documented from NGT.  
3/2: S/p ex lap, descending colectomy and creation of colostomy on 3/1 then re-ex lap, drainage of intra-abdominal stool collection, descending colectomy with mobilization of the splenic flexure and transverse colostomy. Remains intubated on the vent postop. Nutritional intake adequate to meet patients estimated nutritional needs:  Yes Tube Feeding: Vital AF 1.2 at 40 mL/hr via NGT Water Flushes: 100 mL q 4 hours Residuals: 30 mL Diet: DIET NUTRITIONAL SUPPLEMENTS Breakfast, Lunch, Dinner; PROSOURCE 
DIET TUBE FEEDING Please monitor gastric residual every 4 hours and hold for volume over 500 mL. DIET DYSPHAGIA PUREED (NDD1)  3 Honey/3 Moderately Thick Food Allergies: NKFA Current Appetite: Poor Appetite/meal intake prior to admission: Fair, consuming soft foods Feeding Limitations:  [x] Swallowing difficulty: SLP following    [] Chewing difficulty    [x] Other: weakness, respiratory status- recent extubation Current Meal Intake: No data found. BM: 3/8; colostomy Skin Integrity: abdominal surgical incision with wound VAC; MOHINDER drain removed Edema:   [] No     [x] Yes Pertinent Medications: Reviewed: SSI, steroid, 16 units lantus, pantoprazole, levothyroxine IV, fentanyl; reglan stopped 3/8 Recent Labs 03/09/20 
0530 03/08/20 
1630 03/08/20 
0420 03/07/20 
1800 *  --  148* 148*  
K 2.9* 3.5 3.0* 2.8*  
*  --  113* 113* CO2 30  --  31 32 *  --  147* 148* BUN 10  --  13 15 CREA 0.59*  --  0.67 0.62  
CA 8.0*  --  7.8* 7.6*  
MG 2.0  --  2.0 2.3 PHOS 3.2  --  2.4* 2.8 ALB 1.5*  --  1.6* 1.7* SGOT 27  --  32 30 ALT 18  --  19 18 Intake/Output Summary (Last 24 hours) at 3/9/2020 1005 Last data filed at 3/9/2020 0700 Gross per 24 hour Intake 800 ml Output 1440 ml Net -640 ml Anthropometrics: 
Ht Readings from Last 1 Encounters:  
03/03/20 5' 4\" (1.626 m) Last 3 Recorded Weights in this Encounter 03/08/20 0300 03/08/20 0306 03/09/20 7314 Weight: 86.9 kg (191 lb 9.3 oz) 91.1 kg (200 lb 13.4 oz) 88 kg (194 lb 0.1 oz) Body mass index is 33.3 kg/m². Obese, Class I Weight History: previously reported intentional weight loss and previous usual weight of 230 lb; patient reports recent usual weight of 175 lb prior to admission Weight Metrics 3/9/2020 2/28/2020 1/22/2020 1/17/2020 1/2/2020 12/16/2019 12/14/2019 Weight 194 lb 0.1 oz - 171 lb 11.2 oz - 172 lb 6.4 oz - 178 lb 9.6 oz BMI 33.3 kg/m2 28.57 kg/m2 - 28.57 kg/m2 - 28.69 kg/m2 - Some encounter information is confidential and restricted. Go to Review Flowsheets activity to see all data. Admitting Diagnosis: Septic shock ([facility] [address] [A41.9, R65.21] Perforated viscus [R19.8] Pertinent PMHx: COPD, HTN, DM, hepatitis C, sarcoidosis, methadone use, GERD with hiatal hernia, hypothyroidism Procedures: Spine Thoracic Laminectomy T6-T11 with Fusion (12/11/20) 2/2 compression fx of T9-T10 and spinal cord compression at T9 and T10 and spinal cord edema with subsequent paraparesis Education Needs:        [x] None identified  [] Identified - Not appropriate at this time  []  Identified and addressed - refer to education log Learning Limitations:   [x] None identified  [] Identified:  
Cultural, Denominational & ethnic food preferences:  [x] None identified    [] Identified and addressed ESTIMATED NUTRITION NEEDS:  
 
 Calories: [address] kcal (MSJx1.2-1.5) based on  [x] Actual BW 88 kg     [] IBW Protein:  gm (1-1.5 gm/kg) based on  [x] Actual BW      [] IBW Fluid: 1 mL/kcal 
  
MONITORING & EVALUATION:  
  
Nutrition Goal(s):  
- Nutritional needs will be met through adequate oral intake or nutrition support within the next 7 days. Outcome: Progressing towards goal  
 
Monitoring:   [x] Food and nutrient intake   [x] Food and nutrient administration  [x] Comparative standards   [x] Nutrition-focused physical findings   [x] Anthropometric Measurements   [x] Treatment/therapy   [x] Biochemical data, medical tests, and procedures Previous Recommendations (for follow-up assessments only): Implemented Discharge Planning: Nutritional discharge needs unknown at this time. Participated in care planning, discharge planning, & interdisciplinary rounds as appropriate. Blaise Gowers, RD, [facility] Pager: [phone]

## 2020-03-09 NOTE — PROGRESS NOTES
Problem: Dysphagia (Adult) Goal: *Acute Goals and Plan of Care (Insert Text) Description Patient will: 1. Tolerate PO trials with 0 s/s overt distress in 4/5 trials 2. Utilize compensatory swallow strategies/maneuvers (decrease bite/sip, size/rate, alt. liq/sol) with min cues in 4/5 trials 3. Perform oral-motor/laryngeal exercises to increase oropharyngeal swallow function with min cues 4. Complete an objective swallow study (i.e., MBSS) to assess swallow integrity, r/o aspiration, and determine of safest LRD, min A as indicated/ordered by MD  
 
Recommend:  
Puree, honey thick liquids Meds in puree Feeding assistance Aspiration precautions HOB >45 degrees during all intake and for at least 30 min after po Small bites/sips, slow rate of intake, alternating bites/sips Oral care three times daily Outcome: Progressing Towards Goal 
 
SPEECH LANGUAGE PATHOLOGY BEDSIDE SWALLOW EVALUATION/TREATMENT Patient: Modesto Maldonado (65 y.o. female) Date: 3/9/2020 Primary Diagnosis: Septic shock ([facility] [address] [A41.9, R65.21] Perforated viscus [R19.8] Procedure(s) (LRB): 
LAPAROTOMY EXPLORATORY, DESCENDING COLECTOMY, CREATION OF COLOSTOMY (N/A) 8 Days Post-Op Precautions: Aspiration PLOF: Pt reports \"soft foods\" ASSESSMENT : 
Based on the objective data described below, the patient presents with mod oral and suspected mod pharyngeal dysphagia s/p extubation 3/8/20. Pt alert and oriented to person and place, following commands. Voice hoarse/breathy; O2 via nasal cannula in place. Oral mech exam revealed limited/poor natural dentition, sticking tongue out repeatedly during evaluation. Pt with delayed a-p transit, swallow delay, multiple swallows per bolus, weak cough and eye tearing with trials of thin liquid and nectar thick liquids. Pt demo improved tolerance of HTL and puree as no overt s/sx aspiration noted.   Pt with ~25% lingual spread post trials of mech soft, not cleared with multiple liquid washes (removed via toothette). Recommend initiate puree diet with honey thick liquids with strict use of the above mentioned compensatory strategies/aspiration precautions. May benefit from [facility] as appropriate. Will follow for further dysphagia management. D/w RN. TREATMENT : 
Skilled therapy initiated; Educated pt on aspiration precautions and importance of compensatory swallow techniques to decrease aspiration risk (decrease rate of intake & sip/bite size, importance of honey thick liquids, upright @HOB for all po intake and ~30 minutes after po); verbalized comprehension; however, question carryover/insight into deficits. Patient will benefit from skilled intervention to address the above impairments. Patient's rehabilitation potential is considered to be Fair Factors which may influence rehabilitation potential include:  
[]            None noted [x]            Mental ability/status [x]            Medical condition []            Home/family situation and support systems 
[]            Safety awareness 
[]            Pain tolerance/management []            Other: PLAN : 
Recommendations and Planned Interventions: As above Frequency/Duration: Patient will be followed by speech-language pathology 1-2 times per day/4-7 days per week to address goals. Discharge Recommendations: To Be Determined SUBJECTIVE:  
Patient stated I want that water. OBJECTIVE:  
 
Past Medical History:  
Diagnosis Date Abnormally low high density lipoprotein (HDL) cholesterol with hypertriglyceridemia Lipid profile (11/6/2016) showed , , HDL 38, LDL 37 Acute blood loss as cause of postoperative anemia 12/12/2019 Anxiety Bipolar affective disorder ([facility] [address] 12/5/2012 Cellulitis of right forearm 05/04/2017 Chronic back pain Chronic obstructive pulmonary disease (COPD) ([facility] [address] SOB, on [place] O2 Recent admission with psychosis Chronic respiratory failure with hypoxia (HCC) On home oxygen inhalation at 3 LPM via NC Contusion of left elbow 2017 Depression Diabetic neuropathy associated with type 2 diabetes mellitus ([address] Diastolic dysfunction without heart failure Stable on diuretics Falls frequently Gastroesophageal reflux disease with hiatal hernia Generalized osteoarthritis of multiple sites Gout On Allopurinol History of acute renal failure 2013 History of back injury   
 jumped out of second story window History of fracture due to fall 2019 History of hepatitis C   
 treated History of penicillin allergy History of vitamin D deficiency 5/10/2017 Hypertensive heart disease without heart failure Better controlled Hyperuricemia 2017 Hypothyroidism Hypoxemia requiring supplemental oxygen 2014 Intravenous drug user 2017 Long term current use of aspirin Memory difficulty Menopause Mixed connective tissue disease ([address] 5/10/2017 Obesity, Class I, BMI 30-34.9 Olecranon bursitis of right elbow 2017 Opioid dependence ([address] On Methadone Recurrent genital herpes 2013 Right Achilles tendinitis 2017 Sarcoidosis Sickle cell trait ([address] Tobacco use disorder Type 2 diabetes mellitus with diabetic neuropathy, with long-term current use of insulin ([address] HbA1c (2019) = 7.1 Urge urinary incontinence 5/10/2017 Venous insufficiency Wears glasses Past Surgical History:  
Procedure Laterality Date [address] HX  SECTION    
 HX CYST REMOVAL Left  Left foot HX OTHER SURGICAL Left 2017 S/P incision and drainage of the abscess of the left hand and the left foot (2017 - Dr. Kerry Schrader. Fredo Medley) HX THORACIC LAMINECTOMY  2019  S/P T10, T9, T8 laminectomy; T7, T8, T9, T10, T11, T12 posterior thoracic fusion; segmental spinal instrumentation; K2M Davis type, T7-T8, T11-T12 (12/11/2019 - Dr. Rigoberto Morales) HX TUBAL LIGATION Prior Level of Function/Home Situation: 
Home Situation Home Environment: Private residence One/Two Story Residence: One story Living Alone: No 
Support Systems: Child(chio) Patient Expects to be Discharged to[de-identified] Private residence Current DME Used/Available at Home: None Diet prior to admission: pt reports \"soft foods\" Current Diet:  NPO; recommend puree/HTL Cognitive and Communication Status: 
Neurologic State: Alert Orientation Level: Oriented to place, Oriented to situation Cognition: Follows commands Oral Assessment: 
Oral Assessment Labial: No impairment Dentition: Natural;Limited Oral Hygiene: Poor Lingual: Decreased strength;Decreased rate Velum: Unable to visualize Mandible: No impairment P.O. Trials: 
Patient Position: 46 at [facility] Vocal quality prior to P.O.: Low volume;Breathy;Hoarse Consistency Presented: Thin liquid;Honey thick liquid; Nectar thick liquid;Puree;Mechanical soft How Presented: SLP-fed/presented;Cup/sip;Spoon;Straw Bolus Acceptance: No impairment Bolus Formation/Control: Impaired Type of Impairment: Delayed; Posterior;Mastication;Poor Propulsion: Delayed (# of seconds); Discoordination Oral Residue: 10-50% of bolus; Lingual(With mech soft) Initiation of Swallow: Delayed (# of seconds) Laryngeal Elevation: Decreased;Weak Aspiration Signs/Symptoms: Weak cough;Clear throat; Change vocal quality; Watery eyes(With thin and nectar thick liquids ) Pharyngeal Phase Characteristics: Poor endurance; Double swallow;Easily fatigued ; Altered vocal quality Effective Modifications: Small sips and bites; Alternate liquids/solids Cues for Modifications: Moderate Oral Phase Severity: Moderate Pharyngeal Phase Severity : Moderate PAIN: 
Start of Eval: 0 End of Eval: 0 After treatment: []            Patient left in no apparent distress sitting up in chair 
[x]            Patient left in no apparent distress in bed 
[x]            Call bell left within reach [x]            Nursing notified []            Family present 
[]            Caregiver present 
[]            Bed alarm activated COMMUNICATION/EDUCATION:  
[x]            Aspiration precautions; swallow safety; compensatory techniques. []            Patient/family have participated as able in goal setting and plan of care. [x]            Patient/family agree to work toward stated goals and plan of care. []            Patient understands intent and goals of therapy; neutral about participation. []            Patient unable to participate in goal setting/plan of care; educ ongoing with interdisciplinary staff 
[]         Posted safety precautions in patient's room. Thank you for this referral, Araceli Nava M.S., CCC-SLP Speech-Language Pathologist

## 2020-03-09 NOTE — PROGRESS NOTES
Speech Pathology:  
 
Orders received and acknowledged. Pt evaluated this AM with recs of puree and honey-thick liquids, meds in puree, and assistance with PO. Will follow up per SLP care plan. Thank you for this referral. 
 
Sachi Hamlin M.S. CCC-SLP/L Speech-Language Pathologist

## 2020-03-09 NOTE — PROGRESS NOTES
[employer] [facility]. Pulmonary, Critical Care, and Sleep Medicine Name: Jamison Lawson MRN: [id number] : 1955 Hospital: [facility] Date: 3/9/2020  Admission Date: 2020 Chart and notes reviewed. Data reviewed. I have evaluated all findings. [x]I have reviewed the flowsheet and previous days notes. []The patient is unable to give any meaningful history or review of systems because the patient is: 
[]Intubated []Sedated  
[]Unresponsive []The patient is critically ill on     
[]Mechanical ventilation []Pressors []BiPAP [] Interval HPI:Patient is a 62 y. o. female with PMH of COPD (on 3LPM NC home O2), Sarcoidosis, HTN, T2DM,  Hep C, recent Spine Thoracic Laminectomy T6-T11 with Fusion (20) 2/2 compression fx of T9-T10 and spinal cord compression at T9 and T10 and spinal cord edema with subsequent paraparesis, who presented to [facility] ED on 20 c/o acute abdominal pain, SOB with associated constipation x4-5 days PTA, likely 2/2 aforementioned spinal surgery on 19. CT Chest/Abd/Pelvis showed intraperitoneal free air consistent with perforated viscus. Required intubation, Underwent emergent ex lap , was found to have sigmoid colon perforation, 2/2 constipation w/ large stool burden spilling into the peritoneum per Dr. Shai James. Dr. Shai James took pt back to OR on 3/1 for abdominal washout and closure. ICU stay complicated by persistent vasopressor requirement and afibb. Subjective 20 Hospital Day: 9 Overnight events: Pt was extubated yesterday, tolerating salter NC 4L well. Mentation/Activity: AxO x3, following commands Respiratory/ Secretions: non-tenacious, non-bloody Hemodynamics: H/H stable, off pressors Urine output, bowel:  ml over night Diet: Tube feeds Pt's potassium noted to be 2.9 - 40 mEQs x2 given. CXR shows persistent bilateral basilar infiltrates edema and probable effusion/atelectasis. Lasix 20mg x1 given - uop noted to be 1.8L. ROS:Review of systems not obtained due to patient factors. Events and notes from last 24 hours reviewed. Care plan discussed on multidisciplinary rounds. Patient Active Problem List  
Diagnosis Code  Diabetic neuropathy associated with type 2 diabetes mellitus ([facility]) E11.40  Venous insufficiency I87.2  Obesity, Class I, BMI 30-34.9 E66.9  Chronic back pain M54.9, G89.29  
 Generalized osteoarthritis of multiple sites M15.9  Bipolar affective disorder ([facility] [facility] F31.9  Abnormally low high density lipoprotein (HDL) cholesterol with hypertriglyceridemia E78.6, E78.1  Diastolic dysfunction without heart failure I51.89  Chronic obstructive pulmonary disease (COPD) ([facility] [facility] J44.9  Hypertensive heart disease without heart failure I11.9  Depression F32.9  History of back injury Z87.828  Type 2 diabetes mellitus with diabetic neuropathy, with long-term current use of insulin ([facility]) E11.40, Z79.4  Anxiety F41.9  Wears glasses Z97.3  History of hepatitis C Z86.19  
 Sickle cell trait ([facility]) D57.3  History of acute renal failure Z87.448  Hypothyroidism E03.9  Recurrent genital herpes A60.00  Sarcoidosis D86.9  Abscess of right arm L02.413  Hypoxemia requiring supplemental oxygen R09.02, Z99.81  
 Abnormal nuclear stress test R94.39  
 Cellulitis and abscess of hand L03.119, L02.519  
 Cellulitis and abscess of foot L03.119, L02.619  
 Cellulitis of arm, right L03. 113  
 Olecranon bursitis of right elbow M70.21  
 Contusion of left elbow S50. 02XA  Intravenous drug user F19.90  Right Achilles tendinitis M76.61  
 History of penicillin allergy Z88.0  Falls frequently R29.6  Gastroesophageal reflux disease with hiatal hernia K21.9, K44.9  Memory difficulty R41.3  Mixed connective tissue disease ([facility] [address] [facility] M35.1  Impaired mobility and ADLs Z74.09  
 Hyperuricemia E79.0  History of vitamin D deficiency Z86.39  
 Urge urinary incontinence N39.41  Spinal cord compression ([facility]) G95.20  Compression fracture of body of thoracic vertebra ([facility]) S22.000A  Status post laminectomy with spinal fusion Z98.1  Acute blood loss as cause of postoperative anemia D62  
 History of fracture due to fall Z87.81  Chronic respiratory failure with hypoxia ([facility]) J96.11  
 Cellulitis of right forearm L03. 113  
 Gout M10.9  Menopause Z78.0  Long term current use of aspirin Z79.82  
 Opioid dependence ([facility]) F11.20  Tobacco use disorder F17.200  Sepsis ([facility] A41.9  Perforated viscus R19.8  Septic shock ([facility]) A41.9, R65.21 Vital Signs: 
Visit Vitals /73 Pulse 95 Temp 100.2 °F (37.9 °C) Resp 20 Ht 5' 4\" (1.626 m) Wt 88 kg (194 lb 0.1 oz) LMP 2012 (Exact Date) SpO2 98% BMI 33.30 kg/m² O2 Device: Nasal cannula O2 Flow Rate (L/min): 6 l/min(Found on 6 LPM NC, decreased to 4 LPM NC) Temp (24hrs), Av.2 °F (37.3 °C), Min:98.9 °F (37.2 °C), Max:100.2 °F (37.9 °C) Intake/Output:  
Last shift:      No intake/output data recorded. Last 3 shifts:  1901 -  0700 In: 3884.5 [I.V.:1984.5] Out: 0494 [Urine:2650; Drains:250] Intake/Output Summary (Last 24 hours) at 3/9/2020 1303 Last data filed at 3/9/2020 0700 Gross per 24 hour Intake 2020.9 ml Output 1840 ml Net 180.9 ml  
  
 
Ventilator Settings: 
Ventilator Mode: Spontaneous Respiratory Rate Resp Rate Observed: 21 Back-Up Rate: 16 Insp Time (sec): 1 sec Insp Flow (l/min): 49 l/min I:E Ratio: 1;2 
Ventilator Volumes Vt Set (ml): 450 ml Vt Exhaled (Machine Breath) (ml): 471 ml Vt Spont (ml): 438 ml Ve Observed (l/min): 9 l/min Ventilator Pressures Pressure Support (cm H2O): 8 cm H2O 
PIP Observed (cm H2O): 17 cm H2O Plateau Pressure (cm H2O): 15 cm H2O 
MAP (cm H2O): 9 PEEP/VENT (cm H2O): 5 cm H20 Auto PEEP Observed (cm H2O): 0 cm H2O 
 
 Current Facility-Administered Medications Medication Dose Route Frequency  potassium bicarb-citric acid (EFFER-K) tablet 40 mEq  40 mEq Oral Q4H  
 amiodarone (CORDARONE) tablet 200 mg  200 mg Oral BID  sodium chloride 3% hypertonic nebulizer soln  4 mL Nebulization BID RT  
 vancomycin (VANCOCIN) 1,000 mg in 0.9% sodium chloride (MBP/ADV) 250 mL adv  1,000 mg IntraVENous Q12H  hydrocortisone Sod Succ (PF) (SOLU-CORTEF) injection 50 mg  50 mg IntraVENous DAILY  aspirin chewable tablet 81 mg  81 mg Oral DAILY  levothyroxine (SYNTHROID) injection 75 mcg  75 mcg IntraVENous DAILY  insulin glargine (LANTUS) injection 16 Units  16 Units SubCUTAneous DAILY  polyethylene glycol (MIRALAX) packet 17 g  17 g Oral DAILY  multivit-folic acid-herbal 703 (WELLESSE PLUS) oral liquid 30 mL  30 mL Per NG tube DAILY  piperacillin-tazobactam (ZOSYN) 4.5 g in 0.9% sodium chloride (MBP/ADV) 100 mL MBP  4.5 g IntraVENous Q6H  
 pantoprazole (PROTONIX) 40 mg in 0.9% sodium chloride 10 mL injection  40 mg IntraVENous Q12H  
 albuterol-ipratropium (DUO-NEB) 2.5 MG-0.5 MG/3 ML  3 mL Nebulization Q4H RT  
 budesonide (PULMICORT) 500 mcg/2 ml nebulizer suspension  500 mcg Nebulization BID RT  
 insulin lispro (HUMALOG) injection   SubCUTAneous Q6H Telemetry: [x]Sinus []A-flutter []Paced []A-fib []Multiple PVCs Physical Exam:  
  
General: Awake and alert, follows commands HEENT:  Anicteric sclerae; pink palpebral conjunctivae; mucosa moist 
Resp:  Symmetrical chest expansion, no accessory muscle use; good airway entry; no rales/ wheezing/ rhonchi noted CV:  S1, S2 present; regular rate and rhythm GI:  Abdomen soft, non-tender; (+) active bowel sounds Extremities:  +2 pulses on all extremities; no edema/ cyanosis/ clubbing noted Skin:  Warm; no rashes/ lesions noted, normal turgor/cap refill Neurologic:  Non-focal, follows commands Devices:  NGT, R IJ CVL, Renae DATA: 
MAR reviewed and pertinent medications noted or modified as needed Labs: 
Recent Labs 03/09/20 
0530 03/08/20 
0420 03/07/20 
0500 WBC 9.2 10.9 9.2 HGB 8.6* 9.2* 7.5* HCT 27.8* 29.7* 23.4*  
 150 83* Recent Labs 03/09/20 
0530 03/08/20 
1630 03/08/20 
0420 03/07/20 
1800 *  --  148* 148*  
K 2.9* 3.5 3.0* 2.8*  
*  --  113* 113* CO2 30  --  31 32 *  --  147* 148* BUN 10  --  13 15 CREA 0.59*  --  0.67 0.62  
CA 8.0*  --  7.8* 7.6*  
MG 2.0  --  2.0 2.3 PHOS 3.2  --  2.4* 2.8 ALB 1.5*  --  1.6* 1.7* SGOT 27  --  32 30 ALT 18  --  19 18 No results for input(s): PH, PCO2, PO2, HCO3, FIO2 in the last 72 hours. Recent Labs 03/07/20 
0327 FIO2I 40 HCO3I 31.1*  
PCO2I 36.7 PHI 7.537* PO2I 68* Imaging: 
[x]   I have personally reviewed the patients radiographs and reports XR Results (most recent): 
Results from [facility] [place] Encounter encounter on 02/29/20 XR CHEST PORT Narrative EXAM: CHEST  CPT CODE: 31859 CLINICAL INDICATION/HISTORY: Intubated. COMPARISON: 7 March 2020. TECHNIQUE: Single AP portable view of chest at 0416. FINDINGS: There is underpenetrated technique in the apices are partially 
obscured by the patient's head. There is a nasogastric tube with the tip below 
the diaphragm and off the bottom of the image at the previously noted 
endotracheal tube is not definitely seen. There is a right internal jugular 
central venous catheter with tip in the distal superior vena cava. Hazy 
increasing opacity is seen in both lung bases with obscuration hemidiaphragms as 
well as increased hilar interstitial prominence, most consistent with 
interstitial infiltrate/edema and basilar effusions and atelectasis. The 
cardiomediastinal silhouette is normal.  The bones and soft tissues are 
unremarkable except for posterior fusion hardware at the mid thoracic spine, 
unchanged.  
  
 Impression IMPRESSION: 
 
 An endotracheal tube is not definitely seen but is somewhat underpenetrated 
study. Persistent bilateral basilar infiltrates edema and probable 
effusion/atelectasis. CT Results (most recent): 
[place] [facility] [place]20 CT CHEST ABD PELV W CONT Narrative CT SCAN OF THE CHEST, ABDOMEN AND PELVIS, WITH CONTRAST INDICATION: Above. Arrived with shortness of breath and abdominal pain. Septic 
shock. Hypoxia. History of sarcoidosis/COPD. Altered. Abdominal distention and 
tympani. Intraperitoneal free air on chest radiograph. Perforated viscus. COMPARISON: Chest CT 1/21/2010. No prior abdominopelvic CT in PACS. Report is 
noted in the electronic medical record (care everywhere) of previous noncontrast 
enhanced abdominopelvic CT performed elsewhere 7/21/2014. TECHNIQUE: With IV administration of 70 mL Isovue-300, without administration of 
oral contrast, helically acquired serial axial CT images through the chest, 
abdomen and pelvis were obtained. Additional coronal and sagittal reformation 
images were also performed. All CT scans at this facility are performed using dose optimization technique as 
appropriate to the performed exam, to include automated exposure control, 
adjustment of the mA and/or kV according to patient's size (Including 
appropriate matching for site-specific examinations), or use of iterative 
reconstruction technique. FINDINGS: 
 
CT chest:  
 
Endotracheal tube tip is in place cephalad to the level of the alyssa. Esophagogastric tube is noted, tip in the mid stomach. Severe pulmonary emphysematous disease again seen with extensive bullous changes 
most conspicuous in the upper lungs. Scattered scarring. There has been interval 
development of consolidative appearing lung opacities in the lower lobes 
bilaterally consistent with airspace disease likely in addition to atelectasis. The small stable subpleural pulmonary nodule described in the right lower lobe 
on the recent chest CT is again seen, slightly better visualized on the current 
study measuring approximately 4 mm, unchanged compared to the CT of 2/6/2018 
(axial 34). Multifocal chronic nodular pleuro-parenchymal scarring in the left 
upper lobe without significant interval change compared to the preceding chest 
CT or study of 2/6/2018. No definite suspicious new pulmonary nodule/mass identified compared to the 
preceding CT. No evidence of pathologic lymph node enlargement is seen. No evidence of pleural or significant pericardial effusion. CT abdomen/pelvis:  
 
Small ascites, best seen around the liver and in the anterior pelvis adjacent to 
the distal descending and sigmoid colon. The spleen is heterogeneous in attenuation and demonstrates several rounded 
hypodensities as detailed on the recent chest CT of 1/21/2020. Indeterminate attenuation masses in the kidneys bilaterally, 2.7 cm right kidney 
upper to midpole laterally, 2.1 cm left kidney interpolar region posteriorly. Further evaluation recommended, beginning with ultrasound might be helpful when 
clinically feasible if the corresponding lesions can be identified 
sonographically. Additional smaller subcentimeter renal hypodensities 
bilaterally, possibly cysts, too small to be specifically characterized. The liver, gallbladder, pancreas, and adrenal glands appear unremarkable. Scattered calcifications in the abdominal aorta and its major branches. The 
abdominal aorta enhances normally without abdominal aortic aneurysm. A few scattered small subcentimeter short axis lymph nodes bilaterally in the 
pelvis or retroperitoneum. No evidence of pathologic lymph node enlargement is 
seen. Moderate quantity of intraperitoneal free air in the nondependent anterior 
abdomen, consistent with perforated viscus. Additional scattered foci of free air elsewhere in the peritoneum and mesentery, including in the right upper 
quadrant near the gallbladder/duodenum, and scattered bilaterally in the 
mesenteric/peritoneal fat of the upper and lower abdomen. Grouped small foci of 
intraperitoneal free air are noted close to the colonic wall along the 
left/anterior side of the sigmoid colon (reference axial 112-119). The left 
hemicolon is diffusely mildly distended to the rectum containing gas, stool, and 
hyperdense material. Clinical correlation might be helpful regarding recent 
ingestion of hyperdense material. The rectum measures approximately 8.5 cm in 
caliber. The sigmoid measures approximately 7.3 cm in caliber on axial image 115. The site of the or free viscus is uncertain. Common sites could include 
duodenal or gastric perforation such as may be seen due to perforated ulcer. However, there is small focal hypoattenuation along the wall of the sigmoid 
colon on series 2 axial images 111-113 which could potentially reflect a small 
mural defect such as could be seen related to constipation, stercoral colitis, 
diverticular disease. The appendix is seen within the ascites in the right lower quadrant, assessment 
for stranding in the periappendiceal fat limited by the ascites, without gross 
abnormal thickening of the appendix seen. No gas within the appendix. The right 
hemicolon appears grossly unremarkable. There is mild diffuse mural thickening of small bowel loops and mild prominence 
of enhancement, nonspecific. No definite associated pneumatosis. No portal 
venous gas is seen. The SMA/SMV appear to maintain usual orientation. Broadly 
speaking differential considerations could include inflammatory, infectious, 
ischemic. Clinical correlation is recommended including with lactate 
measurement. Conceivably sequela of peritonitis in the setting of bowel 
perforation? Uterus is present.  Small gas is noted within the uterus and vagina, nonspecific, 
can be seen as a physiologic finding. On review of bone windows, there is a superior endplate compression fracture 
with mildly to moderately decreased vertebral body height at L2. Bilateral 
spinal fusion rods spanning from Y9-C1-C75-T12. Severe compression fracture 
deformity again seen at T9. Compression fracture with rather pronounced 
decreased vertebral body height again seen at T5. Mild endplate indentations at 
several other levels including T4, T6, T10. The posterior midline fluid 
collection described on the preceding study was better visualized on the 
preceding study, extensive metallic hardware artifacts noted. Impression Impression: Moderate quantity of intraperitoneal free air. Findings are consistent with 
perforated viscus. The site of perforation is uncertain, as discussed above, 
possibly the sigmoid colon where there is apparent small subtle focal 
hypoattenuation along the wall as detailed above (reference axial images 111-113) close to region where several foci of free air are grouped. The left 
hemicolon is mildly distended and filled with hyperdense appearing stool. Mild diffuse mural thickening and enhancement of the small bowel as described. Small volume of ascites best seen around the liver and in the pelvis adjacent to 
the sigmoid. Bibasilar consolidations, right greater than left, suspicious for airspace 
disease/pneumonia. Indeterminate attenuation bilateral renal masses, follow-up evaluation 
recommended. Splenic hypodensities again seen as described on recent chest CT. Intubated. Severe pulmonary emphysematous disease and fibrotic changes. Multiple otherwise nonacute findings as detailed above. I have discussed these results directly with Dr. Td Guerra in the ED at 12:20 p.m. 
in addition to the patient's consulted surgeon at 12:25 PM. IMPRESSION:  
· Acute on Chronic Respiratory Failure - Emergently intubated in ED for airway protection, 2/2 below. Now post-op and remains on mechanical ventilation. Chronically on 3L NC at home. · Acute Sigmoid Colon Perforation w/ peritonitis - s/p emergent ex-lap w/ drainage of intraabdominal stool and collections; sigmoid colectomy with Dr. Pedro Brown on 02/29/20. Subsequent washout and abdominal closure on 03/01/20.  · Staph bacteremia - Grew on BCx collected 3/3. GNR bacteremia on 2/29, no regrowth · Hypokalemia - Likely 2/2 diuresis. · Hypophosphatemia- ?refeeding syndrome- resolved · Metabolic alkalosis ?diuresis / GI loss · Intermittent Tachyarrhythmia - afibb/flutter - improved · Septic Shock due to peritonitis, UTI · Bilateral PNA - Sputum Cx collected 3/1 grew Moraxella catarrhalis. · Troponemia - demand ischemia, not an MI.  
· Hypothyroidism · ELIF - Likely pre-renal, 2/2 above. Initial ELIF resolved. Pt w/ mild increase in BUN/Cr w/ diuresis. · UTI - UCx grew E.coli.  
· T2DM  
· Paraparesis, in wheelchair: Recent Hx of Spine Thoracic Laminectomy T6-T11 with Fusion (12/11/20) with Dr. Mazin Briones 2/2 Acute compression fx of T9-T10 and spinal cord compression at T9 and T10 and spinal cord edema with subsequent paraparesis. · Hx of Sarcoidosis - Chronic steroid use. · Hx of COPD, on chronic 3L NC, FEV1 51% in 2012, unable to perform repeat studies since  - Follows with Dr. Rocio Mckee in clinic. · Hx of Cocaine and Heroin Abuse - on methadone. Patient Active Problem List  
Diagnosis Code  Diabetic neuropathy associated with type 2 diabetes mellitus ([facility]) E11.40  Venous insufficiency I87.2  Obesity, Class I, BMI 30-34.9 E66.9  Chronic back pain M54.9, G89.29  
 Generalized osteoarthritis of multiple sites M15.9  Bipolar affective disorder ([facility] F31.9  Abnormally low high density lipoprotein (HDL) cholesterol with hypertriglyceridemia E78.6, E78.1  Diastolic dysfunction without heart failure I51.89  
  Chronic obstructive pulmonary disease (COPD) ([facility] J44.9  Hypertensive heart disease without heart failure I11.9  Depression F32.9  History of back injury Z87.828  Type 2 diabetes mellitus with diabetic neuropathy, with long-term current use of insulin ([facility]) E11.40, Z79.4  Anxiety F41.9  Wears glasses Z97.3  History of hepatitis C Z86.19  
 Sickle cell trait ([facility]) D57.3  History of acute renal failure Z87.448  Hypothyroidism E03.9  Recurrent genital herpes A60.00  Sarcoidosis D86.9  Abscess of right arm L02.413  Hypoxemia requiring supplemental oxygen R09.02, Z99.81  
 Abnormal nuclear stress test R94.39  
 Cellulitis and abscess of hand L03.119, L02.519  
 Cellulitis and abscess of foot L03.119, L02.619  
 Cellulitis of arm, right L03. 113  
 Olecranon bursitis of right elbow M70.21  
 Contusion of left elbow S50. 02XA  Intravenous drug user F19.90  Right Achilles tendinitis M76.61  
 History of penicillin allergy Z88.0  Falls frequently R29.6  Gastroesophageal reflux disease with hiatal hernia K21.9, K44.9  Memory difficulty R41.3  Mixed connective tissue disease ([facility] M35.1  Impaired mobility and ADLs Z74.09  
 Hyperuricemia E79.0  History of vitamin D deficiency Z86.39  
 Urge urinary incontinence N39.41  Spinal cord compression ([facility]) G95.20  Compression fracture of body of thoracic vertebra ([facility]) S22.000A  Status post laminectomy with spinal fusion Z98.1  Acute blood loss as cause of postoperative anemia D62  
 History of fracture due to fall Z87.81  Chronic respiratory failure with hypoxia ([facility]) J96.11  
 Cellulitis of right forearm L03. 113  
 Gout M10.9  Menopause Z78.0  Long term current use of aspirin Z79.82  
 Opioid dependence ([facility]) F11.20  Tobacco use disorder F17.200  Sepsis ([address] A41.9  Perforated viscus R19.8  Septic shock ([facility] A41.9, R65.21  
 
  
RECOMMENDATIONS:  
 Neuro: Monitor neuro status. Pulm: Aspiration precautions, HOB>30'. On NC 4L. Titrate goal SPO2 >90% CVS:Monitor HD, MAP goal >65. GI: NPO. Diet Renal: Trend Cr, UOP. Rousseau (+) Hem/Onc: Trend H/H, monitor for s/o active bleeding. Daily CBC. I/D:Trend WBCs and temperature curve. Metabolic: Daily BMP, mag, phos. Trend lytes and replace per protocol. Potassium replaced. Home dose of prednisone is 20mg, will continue solucortef until pt can be weaned to her home dose Endocrine:Q6 glucoses. SSI. Avoid hypoglycemia. Musc/Skin: wound vac per surgery +/- restraints PT/OT/SLP Full Code Discussed in interdisciplinary rounds. Plan to transfer pt to stepdown today. Spoke to Dr. Roseann Spann - hospitalist.  
 
Best practice : 
 
Glycemic control IHI ICU bundles: 
 Central Line Bundle Followed  and Rousseau Bundle Followed [facility] Vent patients- VAP bundle, aim to keep peak plateau pressure 96-74MP H2O Sress ulcer prophylaxis. DVT prophylaxis. Need for Lines, rousseau assessed. Restraints need. Palliative care evaluation. High complexity decision making was performed during this consultation and evaluation. [x]       Pt is at high risk for further organ failure and dysfunction. Mack Pinto PA-C 
03/09/20 Pulmonary, Critical Care Medicine [facility] I saw and evaluated the patient, performing the key elements of the service. I discussed the findings, assessment and plan with the PA and agree with the PA's findings and plan as documented in the PA's note. All edits and changes made above or are mentioned in my addendum. Total of 37 min critical care time spent at bedside during the course of care providing evaluation,management and care decisions and ordering appropriate treatment related to critical care problems exclusive of procedures.  
The reason for providing this level of medical care for this critically ill patient was due a critical illness that impaired one or more vital organ systems such that there was a high probability of imminent or life threatening deterioration in the patients condition. This care involved high complexity decision making to assess, manipulate, and support vital system functions, to treat this degree vital organ system failure and to prevent further life threatening deterioration of the patients condition. 59-year-old female with a past medical history of COPD,, chronic hypoxic respiratory failure on 3 L home oxygen, sarcoidosis on home prednisone 20 mg (patient reported, record shows 30 mg), recent spine thoracic laminectomy due to compression fracture, spinal cord edema and paraparesis, presented to [facility] with complaints of acute abdominal pain and shortness of breath, with constipation for a week prior to arrival.  Work-up during the hospitalization included CT chest abdomen pelvis which showed significant intraperitoneal free air consistent with perforated viscus, patient underwent emergent exploratory laparotomy and intubation, as well as large stool burden spilling into the peritoneum, status post I&D. Patient underwent back to the OR for repeat washout and closure on 3/1. Patient's ICU course was complicated by persistent vasopressor requirement due to septic shock as well as A. fib. Patient was extubated yesterday, weaned off of vasopressors overnight into this morning. Patient still requiring multiple electrolyte replacements, with low potassium of 2.9. Patient started on feeds by general surgery. Due to bilateral pleural effusions, patient diuresed, additional potassium replacement given. Given history of sarcoidosis, continued patient on hydrocortisone. Patient remains weak, likely due to critical illness myopathy/polyneuropathy, as well as significant deconditioning. PT/OT, out of bed to chair, bronchial hygiene protocol.   Cardiology also following due to paroxysmal A. fib/flutter with RVR, patient on amiodarone drip, switch to p.o. amiodarone. Due to abdominal surgery and, patient cannot be given anticoagulation, okay for aspirin. Speech and swallow consulted, patient okay with puréed diet with honey thick liquids, along with aspiration precautions. Due to poor p.o. intake, will leave NG tube in place. With regards to septic shock, ID was consulted when the patient was on broad-spectrum antibiotics, advised to continue on vancomycin and Zosyn, okay to discontinue fluconazole in the setting of perforated viscus. Due to poor IV access, midline placed today, will remove IJ central line. General surgery following, however signed off today --unsure why. Will defer timing of repeat CT to evaluate for abscesses per ID. Will defer to wound care nursing regarding management of abdominal dressings and abdominal wound VAC. Prognosis is guarded Karmen Duncan MD/MPH Pulmonary, Critical Care Medicine [facility]

## 2020-03-10 NOTE — DIABETES MGMT
GLYCEMIC CONTROL PLAN OF CARE Assessment/Recommendations: 
Lab glucose this am 98 mg/dl Noted pt receiving steroids Continue basal and corrective insulin coverage as ordered Will continue inpatient monitoring. Most recent blood glucose values: 
Results for Diego Vyas (MRN [id number]) as of 3/10/2020 09:45 Ref. Range 3/9/2020 08:55 3/9/2020 11:54 3/9/2020 17:07 3/10/2020 01:45 3/10/2020 06:23 GLUCOSE,FAST - POC Latest Ref Range: 70 - 110 mg/dL 166 (H) 275 (H) 256 (H) 132 (H) 124 (H) Current A1C of 8.2 % is equivalent to average blood glucose of 189_mg/dl over the past 2-3 months. Current hospital diabetes medications:  
Lantus 16 units daily Lispro corrective insulin coverage every 6 hours as needed Previous day's insulin requirements:  
Lantus 16 units Lispro 18 units corrective insulin Home diabetes medications:  Per pta med list 
Lantus 20 units daily before breakfast 
Humalog AC based on her blood sugar reading Diet:   
NPO. TF. 
Education:  ____Refer to Diabetes Education Record  
          _x__Education not indicated at this time Deisi Aguilar RN CDE Ext [phone]

## 2020-03-10 NOTE — WOUND CARE
Patient seen by Wound Care on 3/5/2020. No change in treatment plan at this time. Bedside nursing to continue NPWT dressing changes per original post surgical order. Stoma and ostomy appliance care to continue as needed by bedside nursing. No intervention from Wound Care required at this time.

## 2020-03-10 NOTE — PROGRESS NOTES
[facility] Hospitalist Group Progress Note Patient: Miesha Bush Age: 62 y.o. : 1955 MR#: [id number] SSN: [id number] Date/Time: 3/10/2020 Subjective:  
 
Review of systems No CP  
NO NVD No SOB  NO Cough No distress Assessment/Plan:  
Patient with sigmoid colonic perforation, secondary to constipation with large amount of stool burden spilling into peritoneal, peritonitis, also present Prevotella bloodstream infection now status post ex lap with resection Also E. coli cystitis, acute on chronic respiratory failure. 1.  Sepsis-shock 2 acute hypoxic respiratory failure secondary to sepsis 3 cystitis E. coli present on admission 4 status post exploratory laparotomy-recovering well surgery signed off 
5 bacteremia-followed by ID  
6 anemia due to chronic illness 7 hypokalemia-on replacement 8 hypoalbuminemia-patient was on TPN with supplements now able to eat p.o. discussed with nutrition 9 Bilateral PNA - Sputum Cx collected 3/1 grew Moraxella catarrhalis 10 Plan: 
 
-Complex patient with complex infective etiology, added problem will be penicillin allergic, patient is seen by ID will continue to monitor with ID 
 
-Follow electrolytes and correct 
 
-Follow with subspecialty Case discussed with:  []Patient  [] Family ( In room with patient )    []Nursing  []Case Management DVT Prophylaxis:  []Lovenox  []Hep SQ  []SCDs  []Coumadin   []On Heparin gtt Objective:  
VS:  
Visit Vitals /65 Pulse 83 Temp 99.6 °F (37.6 °C) Resp 30 Ht 5' 4\" (1.626 m) Wt 88 kg (194 lb 0.1 oz) LMP 2012 (Exact Date) SpO2 100% BMI 33.30 kg/m² Tmax/24hrs: Temp (24hrs), Av.8 °F (37.1 °C), Min:97.3 °F (36.3 °C), Max:100.8 °F (38.2 °C) IOBRIEF Intake/Output Summary (Last 24 hours) at 3/10/2020 1042 Last data filed at 3/10/2020 0900 Gross per 24 hour Intake 2856.7 ml Output 4052 ml Net -1195.3 ml  
 
 
 General: No distress noted Cardiovascular: S1S2 - regular , No Murmur Pulmonary: Equal expansion , No Use of accessory muscles , No Rales No Rhonchi GI:  +BS in all four quadrants, soft, non-tender Extremities:  No edema; 2+ dorsalis pedis pulses bilaterally Neuro: No focal weakness Medications:  
Current Facility-Administered Medications Medication Dose Route Frequency  potassium bicarb-citric acid (EFFER-K) tablet 40 mEq  40 mEq Per NG tube Q4H  
 amiodarone (CORDARONE) tablet 200 mg  200 mg Oral BID  ELECTROLYTE REPLACEMENT PROTOCOL - Potassium Standard Dosing   1 Each Other PRN  
 ELECTROLYTE REPLACEMENT PROTOCOL - Potassium Standard  Dosing Details for Fluid Restricted Patients  1 Each Other PRN  
 sodium chloride 3% hypertonic nebulizer soln  4 mL Nebulization BID RT  
 ELECTROLYTE REPLACEMENT PROTOCOL - Potassium Standard Dosing   1 Each Other PRN  
 vancomycin (VANCOCIN) 1,000 mg in 0.9% sodium chloride (MBP/ADV) 250 mL adv  1,000 mg IntraVENous Q12H  hydrocortisone Sod Succ (PF) (SOLU-CORTEF) injection 50 mg  50 mg IntraVENous DAILY  fentaNYL citrate (PF) injection  mcg   mcg IntraVENous Q2H PRN  
 aspirin chewable tablet 81 mg  81 mg Oral DAILY  levothyroxine (SYNTHROID) injection 75 mcg  75 mcg IntraVENous DAILY  insulin glargine (LANTUS) injection 16 Units  16 Units SubCUTAneous DAILY  polyethylene glycol (MIRALAX) packet 17 g  17 g Oral DAILY  multivit-folic acid-herbal 855 (WELLESSE PLUS) oral liquid 30 mL  30 mL Per NG tube DAILY  piperacillin-tazobactam (ZOSYN) 4.5 g in 0.9% sodium chloride (MBP/ADV) 100 mL MBP  4.5 g IntraVENous Q6H  
 acetaminophen (TYLENOL) solution 650 mg  650 mg Per NG tube Q4H PRN  
 diphenhydrAMINE (BENADRYL) injection 25 mg  25 mg IntraVENous Q4H PRN  
 sodium chloride (NS) flush 5-10 mL  5-10 mL IntraVENous PRN  
 sodium chloride (NS) flush 5-40 mL  5-40 mL IntraVENous PRN  
  pantoprazole (PROTONIX) 40 mg in 0.9% sodium chloride 10 mL injection  40 mg IntraVENous Q12H  
 albuterol-ipratropium (DUO-NEB) 2.5 MG-0.5 MG/3 ML  3 mL Nebulization Q4H RT  
 albuterol (ACCUNEB) nebulizer solution 1.25 mg  1.25 mg Nebulization Q6H PRN  
 budesonide (PULMICORT) 500 mcg/2 ml nebulizer suspension  500 mcg Nebulization BID RT  
 insulin lispro (HUMALOG) injection   SubCUTAneous Q6H  
 glucose chewable tablet 16 g  4 Tab Oral PRN  
 glucagon (GLUCAGEN) injection 1 mg  1 mg IntraMUSCular PRN  
 dextrose (D50W) injection syrg 12.5-25 g  25-50 mL IntraVENous PRN Labs:   
Recent Labs  
  03/10/20 
1236 03/09/20 
0530 03/08/20 
0420 WBC 6.4 9.2 10.9 HGB 8.3* 8.6* 9.2* HCT 26.7* 27.8* 29.7*  
 167 150 Recent Labs  
  03/10/20 
7959 03/09/20 
0530 03/08/20 
1630 03/08/20 
0420 * 149*  --  148*  
K 2.5* 2.9* 3.5 3.0*  
 113*  --  113* CO2 34* 30  --  31  
GLU 98 106*  --  147* BUN 8 10  --  13  
CREA 0.56* 0.59*  --  0.67 CA 8.1* 8.0*  --  7.8*  
MG 1.6 2.0  --  2.0 PHOS 2.7 3.2  --  2.4* ALB 1.5* 1.5*  --  1.6* SGOT 21 27  --  32 ALT 17 18  --  19 Signed By: Cesar Marie MD   
 March 10, 2020

## 2020-03-10 NOTE — PROGRESS NOTES
Cardiology progress note CARDIOLOGY PROGRESS NOTE 
RECS: 
 
 
 
1. Paroxymal Atrial flutter/atrial fibrillation  with RVR- maintaining NSR- continue  amiodarone po. Follow ekg today. Discussed with Dr Trini Avalos. Ok to start anticoagulation added Eliquis 5 mg bid. Will monitor H&H closely 2. Acute respiratory Failure- intubated. Extubated 3/7/20 - on O2 by Nasal canula 3. Acute Sigmoid Colon Perforation - s/p Emergent Ex-Lap with drainage of intraabdominal stool and collections; sigmoid colectomy with Dr. Trini Avalos on 02/29/20.sigmoid perforation, secondary peritonitis  S/p Re-exploratory laparotomy, drainage of intra-abdominal stool collection, descending colectomy with mobilization of the splenic flexure, and transverse colostomy on 3/1. 
4. Septic Shock-improved  on antibiotics managed by [facility] and ID  
5. Hypotension- in the setting of #3 resolved 6. Acute on chronic diastolic heart failure-s/p lasix 20 iv x1 3/9/20- good diuresis 7. Hx sarcoidosis  
8. Hx of abnormal Dobutamine  stress test- 11/2016  
9. Hx of Tobacco abuse 10. Pulmonary hypertension with PAP 80 mmHg on recent echo  
11. Abnormal TSH- medications per [facility] care 12. Hypokalemia- persistent replete by [facility]. 
  
Multiple comorbidities but fortunately maintaining sinus rhythm on amiodarone which will be continued. Anticoagulation is being started as okayed by surgery. Continue supportive care.  
  
  
11/2016  
2. Dobutamine stress test was done, reported EF of 44% with a small reversible inferior wall defect.-medically reated 
  
  
       
02/29/20 ECHO ADULT FOLLOW-UP OR LIMITED 03/03/2020 3/3/2020  
  Narrative · Dilated right ventricle. Reduced systolic function. Abnormal right  
ventricular septal motion. Systolic flattening of interventricular septum  
consistent with right ventricle pressure overload. · Moderate tricuspid valve regurgitation is present. · Severely elevated central venous pressure (15+ mmHg); IVC diameter is  
larger than 21 mm and collapses less than 50% with respiration. · Severe pulmonary hypertension. · Pulmonary arterial systolic pressure is 01.4 mmHg · Normal cavity size, wall thickness and systolic function (ejection  
fraction normal). Estimated left ventricular ejection fraction is 55 -  
60%. · Dilated right atrium. 
   
    Signed by: Sally Morocho MD  
 
 
 
ASSESSMENT: 
Hospital Problems  Date Reviewed: 2/28/2020 Codes Class Noted POA Perforated viscus ICD-10-CM: R19.8 ICD-9-CM: 799.89  2/29/2020 Unknown Septic shock (HCC) ICD-10-CM: A41.9, R65.21 ICD-9-CM: 038.9, 785.52, 995.92  2/29/2020 Unknown SUBJECTIVE: 
Patient alert. No c/o dyspnea. Weak. No distress noted OBJECTIVE: 
 
VS:  
Visit Vitals /67 Pulse (!) 102 Temp 99.6 °F (37.6 °C) Resp 27 Ht 5' 4\" (1.626 m) Wt 88 kg (194 lb 0.1 oz) SpO2 100% BMI 33.30 kg/m² Intake/Output Summary (Last 24 hours) at 3/10/2020 1205 Last data filed at 3/10/2020 0900 Gross per 24 hour Intake 2210 ml Output 2852 ml Net -642 ml TELE: normal sinus rhythm General: No acute distress HENT: Normocephalic, atraumatic. Neck :  Supple Cardiac:  Normal S1/S2 Chest/Lungs: harsh breath sound left lower lung. RL lobe diminished Abdomen: Soft Extremities: no edema noted Labs: Results:  
   
Chemistry Recent Labs  
  03/10/20 
6065 03/09/20 
0530 03/08/20 
1630 03/08/20 
0420 GLU 98 106*  --  147* * 149*  --  148*  
K 2.5* 2.9* 3.5 3.0*  
 113*  --  113* CO2 34* 30  --  31  
BUN 8 10  --  13  
CREA 0.56* 0.59*  --  0.67 CA 8.1* 8.0*  --  7.8*  
MG 1.6 2.0  --  2.0 PHOS 2.7 3.2  --  2.4* AGAP 5 6  --  4  
BUCR 14 17  --  19  
AP 51 54  --  58  
TP 5.5* 5.4*  --  5.2* ALB 1.5* 1.5*  --  1.6*  
GLOB 4.0 3.9  --  3.6 AGRAT 0.4* 0.4*  --  0.4* CBC w/Diff Recent Labs  
  03/10/20 4098 03/09/20 
0530 03/08/20 
9426 WBC 6.4 9.2 10.9 RBC 3.12* 3.23* 3.46* HGB 8.3* 8.6* 9.2* HCT 26.7* 27.8* 29.7*  
 167 150 GRANS 54 79* 80* LYMPH 27 9* 12* EOS 0 0 0 Cardiac Enzymes No results for input(s): CPK, CKND1, SANTOS in the last 72 hours. No lab exists for component: [place] Coagulation No results for input(s): PTP, INR, APTT, INREXT, INREXT in the last 72 hours. Lipid Panel Lab Results Component Value Date/Time Cholesterol, total 141 09/30/2019 12:00 AM  
 HDL Cholesterol 39 (L) 09/30/2019 12:00 AM  
 LDL, calculated 61 09/30/2019 12:00 AM  
 VLDL, calculated 41 (H) 09/30/2019 12:00 AM  
 Triglyceride 204 (H) 09/30/2019 12:00 AM  
 CHOL/HDL Ratio 3.5 11/06/2016 04:18 AM  
  
BNP No results for input(s): BNPP in the last 72 hours. Liver Enzymes Recent Labs  
  03/10/20 
8663 TP 5.5* ALB 1.5* AP 51 SGOT 21  
  
Digoxin Thyroid Studies Lab Results Component Value Date/Time TSH 5.11 (H) 03/05/2020 06:25 PM  
    
 
 
 
Suma E Halecki, NP -C I have independently evaluated and examined the patient. All relevant labs and testing data are reviewed. Care plan discussed and updated after review.  
Mina Covington MD

## 2020-03-10 NOTE — PROGRESS NOTES
Problem: Self Care Deficits Care Plan (Adult) Goal: *Acute Goals and Plan of Care (Insert Text) Description Occupational Therapy Goals Initiated 03/10/2020 within 7 day(s). 1.  Patient will participate in functional maintenance program (FMP) at this time to maintain/increase BUE ROM and strength for self-care/ADL tasks. Prior Level of Function: Per patient she was independent with upper body dressing, grooming, and self-feeding and required assistance for bathing/dressing. She used a wheelchair for functional mobility and was able to self propel. Patient was admitted in December and an ARU candidate where she was moderate assistance for transfers, supervision for UB bathing/dressing, and modified independent for feeding in January. Patient was able to sit EOB, with F+ balance. Outcome: Progressing Towards Goal 
  
 
OCCUPATIONAL THERAPY EVALUATION Patient: Airam Valadez (65 y.o. female) Date: 3/10/2020 Primary Diagnosis: Septic shock ([facility] [address] [A41.9, R65.21] Perforated viscus [R19.8] Procedure(s) (LRB): 
LAPAROTOMY EXPLORATORY, DESCENDING COLECTOMY, CREATION OF COLOSTOMY (N/A) 9 Days Post-Op Precautions: Fall(Spinal sx (12/2019)) ASSESSMENT : 
Patient cleared to participate in OT evaluation by RN. Upon entering the room, the pt was semi-reclined in bed , alert, and agreeable to participate in OT evaluation. Patient oriented to person and place and demos intermittent confusion this session, telling this OT that I was her daughter's best friend? Patient educated on the role of OT and the evaluation process. Patient verbalized understanding. OT assessed and performed BUE ROM. AROM generally decreased, PROM WFL. Pt had 2+/5 strength in BUE and AROM increased following PROM. Patient educated on the importance of performing exercises, even if movements are minimal to increase ROM and decrease edema.  Patient requesting water this session, RN aware and notified. Recommending functional maintenance program (FMP) at this time to maintain/increase BUE ROM and strength for self-care/ADL tasks. Educated rehab technician in plan of care and exercises. Patient will benefit from skilled intervention to address the above impairments. Patient's rehabilitation potential is considered to be Good Factors which may influence rehabilitation potential include:  
[]             None noted [x]             Mental ability/status [x]             Medical condition [x]             Home/family situation and support systems [x]             Safety awareness [x]             Pain tolerance/management 
[]             Other: PLAN : 
Recommendations and Planned Interventions:  
[]               Self Care Training                  []      Therapeutic Activities []               Functional Mobility Training   []      Cognitive Retraining 
[x]               Therapeutic Exercises           []      Endurance Activities []               Balance Training                    []      Neuromuscular Re-Education []               Visual/Perceptual Training     []      Home Safety Training 
[]               Patient Education                   []      Family Training/Education []               Other (comment): Frequency/Duration: Patient will be followed by rehab technician 3 times a week to address goals. Discharge Recommendations: [facility] vs [facility] Further Equipment Recommendations for Discharge: hospital bed and mechanical lift SUBJECTIVE:  
Patient stated \"I can't move it much OBJECTIVE DATA SUMMARY:  
 
Past Medical History:  
Diagnosis Date  Abnormally low high density lipoprotein (HDL) cholesterol with hypertriglyceridemia Lipid profile (11/6/2016) showed , , HDL 38, LDL 37  Acute blood loss as cause of postoperative anemia 12/12/2019  Anxiety  Bipolar affective disorder ([facility] [address] 12/5/2012  Cellulitis of right forearm 2017  Chronic back pain  Chronic obstructive pulmonary disease (COPD) ([address] SOB, on [place] O2 Recent admission with psychosis  Chronic respiratory failure with hypoxia ([address] On home oxygen inhalation at 3 LPM via NC  
 Contusion of left elbow 2017  Depression  Diabetic neuropathy associated with type 2 diabetes mellitus ([address]  Diastolic dysfunction without heart failure Stable on diuretics  Falls frequently  Gastroesophageal reflux disease with hiatal hernia  Generalized osteoarthritis of multiple sites  Gout On Allopurinol  History of acute renal failure 2013  History of back injury   
 jumped out of second story window  History of fracture due to fall 2019  
 History of hepatitis C   
 treated  History of penicillin allergy  History of vitamin D deficiency 5/10/2017  Hypertensive heart disease without heart failure Better controlled  Hyperuricemia 2017  Hypothyroidism  Hypoxemia requiring supplemental oxygen 2014  Intravenous drug user 2017  Long term current use of aspirin  Memory difficulty  Menopause  Mixed connective tissue disease ([address] 5/10/2017  Obesity, Class I, BMI 30-34.9  Olecranon bursitis of right elbow 2017  Opioid dependence ([address] On Methadone  Recurrent genital herpes 2013  Right Achilles tendinitis 2017  Sarcoidosis  Sickle cell trait ([address]  Tobacco use disorder  Type 2 diabetes mellitus with diabetic neuropathy, with long-term current use of insulin ([address] HbA1c (2019) = 7.1  Urge urinary incontinence 5/10/2017  Venous insufficiency  Wears glasses Past Surgical History:  
Procedure Laterality Date Toño  HX  SECTION    
 HX CYST REMOVAL Left  Left foot  HX OTHER SURGICAL Left 2017 S/P incision and drainage of the abscess of the left hand and the left foot (4/17/2017 - Dr. Eliazar Haney. Beatrice Smart)  HX THORACIC LAMINECTOMY  12/11/2019 S/P T10, T9, T8 laminectomy; T7, T8, T9, T10, T11, T12 posterior thoracic fusion; segmental spinal instrumentation; K2M [place] type, T7-T8, T11-T12 (12/11/2019 - Dr. Ish Joshi)  HX TUBAL LIGATION Barriers to Learning/Limitations: yes;  altered mental status (i.e.Sedation, Confusion) Compensate with: visual, verbal, tactile, kinesthetic cues/model Home Situation:  
Home Situation Home Environment: Private residence # Steps to Enter: 4(pt was dependent with stairs) Rails to Enter: Yes Hand Rails : Bilateral 
One/Two Story Residence: One story Living Alone: (has 24/7 nursing/aide care) Support Systems: (nursing/aide care 24/7) Patient Expects to be Discharged to[de-identified] Private residence Current DME Used/Available at Home: Wheelchair, Commode, bedside 
[x]  Right hand dominant   []  Left hand dominant Cognitive/Behavioral Status: 
Neurologic State: Alert;Confused Orientation Level: Oriented to person;Oriented to place Cognition: Follows commands;Decreased attention/concentration Safety/Judgement: Fall prevention Skin: Visible Skin Intact Edema: BUE Vision/Perceptual:   
Acuity: Within Defined Limits Coordination: BUE Fine Motor Skills-Upper: Left Intact; Right Intact Gross Motor Skills-Upper: Left Impaired;Right Impaired Strength: BUE Strength: (BUE 2+/5) Tone & Sensation: BUE Tone: Normal 
Sensation: Intact Range of Motion: BUE 
AROM: Generally decreased, functional(elbow/wrist, shoulder grossly decreased) PROM: Within functional limits Functional Mobility and Transfers for ADLs: 
Bed Mobility: 
Rolling: (would require total A) ADL Assessment:  
Feeding: Maximum assistance Oral Facial Hygiene/Grooming: Maximum assistance Bathing: Maximum assistance Upper Body Dressing: Maximum assistance Lower Body Dressing: Total assistance Toileting: Total assistance ADL Intervention: 
Grooming Grooming Assistance: Maximum assistance(hand over hand assist) Washing Face: Maximum assistance Cognitive Retraining Safety/Judgement: Fall prevention Therapeutic Exercise: 
Patient educated on the importance of elbow flex/ext, wrist flex/ext and ulnar and radial deviation, as well as active finger and hand movement to prevent edema and maintain function. Patient participated in PROM of BUE to perform exercises. Pain: 
Pain level pre-treatment: 5/10, mouth Pain Intervention(s): Medication (see MAR); Response to intervention: Nurse notified, See doc flow Activity Tolerance:  
Good Please refer to the flowsheet for vital signs taken during this treatment. After treatment:  
[] Patient left in no apparent distress sitting up in chair 
[x] Patient left in no apparent distress in bed 
[x] Call bell left within reach [x] Nursing notified 
[] Caregiver present 
[] Bed alarm activated COMMUNICATION/EDUCATION:  
[x] Role of Occupational Therapy in the acute care setting 
[x] Home safety education was provided and the patient/caregiver indicated understanding. [x] Patient/family have participated as able in goal setting and plan of care. [x] Patient/family agree to work toward stated goals and plan of care. [] Patient understands intent and goals of therapy, but is neutral about his/her participation. [] Patient is unable to participate in goal setting and plan of care. Thank you for this referral. 
FELISHA Vigil/L Time Calculation: 12 mins Eval Complexity: History: HIGH Complexity : Extensive review of history including physical, cognitive and psychosocial history ;   
Examination: HIGH Complexity : 5 or more performance deficits relating to physical, cognitive , or psychosocial skils that result in activity limitations and / or participation restrictions;  
 Decision Making:HIGH Complexity : Patient presents with comorbidities that affect occupational performance. Signifigant modification of tasks or assistance (eg, physical or verbal) with assessment (s) is necessary to enable patient to complete evaluation

## 2020-03-10 NOTE — PROGRESS NOTES
Infectious Disease progress Note Reason: sepsis, gram negative bacteremia, staph epidermidis bacteremia Current abx Prior abx Pip/tazo since 3/1 Vancomycin since 3/6/20 Cefepime, metronidazole 2/29-3/1 Vancomycin 2/29-3/2/20 Lines:  
 
 
Assessment : 
 
62 y.o. 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 Clinical presentation c/w septic shock (POA) due to prevotella bloodstream infection (positive blood culture 2/29), sigmoid perforation, secondary peritonitis s/p emergent Ex lap on 2/29/20. Intra operatively found to have sigmoid colon perforation, 2/2 constipation with large amount of stool burden spilling into the peritoneum per Dr. Trini Avalos. S/p Re-exploratory laparotomy, drainage of intra-abdominal stool collection, descending colectomy with mobilization of the splenic flexure, and transverse colostomy on 3/1. Significant pyuria on UA 2/29. Urine culture >100,000 e.coli suggestive of probable cystitis. Acute on chronic respiratory failure - likely due to sepsis. Sputum culture 3/1- moraxella (beta lactamase positive). Diffuse pulmonary opacities concerning for aspiration pneumonia. Single positive blood culture for epidermidis on 3/3 seemed likely contaminant since patient was improving off vancomycin. However, Blood culture 3/5: Positive for Staphylococcus epidermidis. At this time it is difficult to determine the significance of staph epidermidis bacteremia. Management complicated by inability to remove central line since patient has difficult IV access and unable to obtain peripheral IV line. Hence, started vancomycin and repeat blood cultures. abx management complicated due to h/o pcn allergy. Currently tolerating pip/tazo without difficulties. Clinically better. Off pressors. Negative fungitell argues against fungemia. Recommendations: 1. continue pip/tazo, vancomycin 2. D/c fluconazole 3. Monitor for intra abdominal abscess. 4. F/u surgery recommendations 5. Obtain US guided midline and remove right IJ CVC -discussed with interventional radiology. Midline ordered yesterday. They will attempt to place it today depending on the schedule. 6. F/u Repeat blood cultures 3/9/2020 
7. Will monitor renal function since pip/tazo, vancomycin combination carries high risk of nephrotoxicity 8. Remove Renae. Above plan was discussed in details with primary team. Please call me if any further questions or concerns. Will continue to participate in the care of this patient. HPI: 
 
Denies significant abdominal pain. No nausea or vomiting. No subjective fever/chills. home Medication List  
 Details  
roflumilast (DALIRESP) 250 mcg tab Take 250 mcg by mouth daily. Qty: 30 Tab, Refills: 0  
  
ARIPiprazole (ABILIFY) 10 mg tablet Take 1 Tab by mouth. aspirin-calcium carbonate 81 mg-300 mg calcium(777 mg) tab Take 1 Tab by mouth. divalproex DR (DEPAKOTE) 250 mg tablet Take 1 Tab by mouth. doxycycline (MONODOX) 100 mg capsule   
  
gabapentin (NEURONTIN) 300 mg capsule Take 1 Cap by mouth. ipratropium (ATROVENT) 0.02 % soln   
  
methylPREDNISolone (MEDROL DOSEPACK) 4 mg tablet   
  
oxyCODONE-acetaminophen (PERCOCET) 5-325 mg per tablet Take 1 Tab by mouth. tolterodine (DETROL) 1 mg tablet Take 1 Tab by mouth. traMADol (ULTRAM) 50 mg tablet Take 1 Tab by mouth. traZODone (DESYREL) 100 mg tablet Take 1 Tab by mouth. !! busPIRone (BUSPAR) 15 mg tablet Take 15 mg by mouth two (2) times a day. Indications: repeated episodes of anxiety Qty: 60 Tab, Refills: 2 OXYGEN-AIR DELIVERY SYSTEMS 3 L/min by Nasal route continuous. [employer] Choice O2  
  
albuterol-ipratropium (DUO-NEB) 2.5 mg-0.5 mg/3 ml nebu 3 mL by Nebulization route every six (6) hours as needed for Wheezing. Qty: 30 Nebule, Refills: 1  
  
!! busPIRone (BUSPAR) 10 mg tablet Take 10 mg by mouth three (3) times daily. Indications: repeated episodes of anxiety  
  
cholecalciferol (VITAMIN D3) (1000 Units /25 mcg) tablet Take 1,000 Units by mouth two (2) times a day. levothyroxine (SYNTHROID) 100 mcg tablet Take 100 mcg by mouth Daily (before breakfast). insulin glargine (LANTUS U-100 INSULIN) 100 unit/mL injection 20 Units by SubCUTAneous route Daily (before breakfast). famotidine (PEPCID) 20 mg tablet Take 20 mg by mouth nightly. aspirin 81 mg chewable tablet Take 1 Tab by mouth daily (with breakfast). Indications: stroke prevention 
Qty: 30 Tab, Refills: 0  
  
docusate sodium (COLACE) 100 mg capsule Take 1 Cap by mouth daily (after breakfast). Indications: constipation 
Qty: 30 Cap, Refills: 0  
  
predniSONE (DELTASONE) 20 mg tablet Take 20 mg by mouth daily (with breakfast). Indications: sarcoidosis Qty: 30 Tab, Refills: 0 Associated Diagnoses: Sarcoidosis  
  
ascorbic acid, vitamin C, (VITAMIN C) 250 mg tablet Take 1 Tab by mouth daily (with breakfast). Qty: 30 Tab, Refills: 0 Associated Diagnoses: Acute blood loss as cause of postoperative anemia  
  
calcium citrate 200 mg (950 mg) tablet Take 1 Tab by mouth two (2) times a day. Indications: post-menopausal osteoporosis prevention 
Qty: 60 Tab, Refills: 0 Associated Diagnoses: Status post laminectomy with spinal fusion  
  
ferrous sulfate 325 mg (65 mg iron) tablet Take 1 Tab by mouth daily (with breakfast). Qty: 30 Tab, Refills: 0  Associated Diagnoses: Acute blood loss as cause of postoperative anemia  
  
acetaminophen (TYLENOL) 325 mg tablet Take 2 Tabs by mouth every four (4) hours as needed for Pain. Indications: pain 
Qty: 60 Tab, Refills: 0 Associated Diagnoses: Status post laminectomy with spinal fusion  
  
brief disposable (ADULT) misc by Does Not Apply route. Qty: 1 Package, Refills: 0 Associated Diagnoses: Urge urinary incontinence  
  
methadone (DOLOPHINE) 5 mg tablet Take 4 Tabs by mouth daily. Max Daily Amount: 20 mg. 
Qty: 10 Tab, Refills: 0 Associated Diagnoses: History of back injury  
  
polyethylene glycol (MIRALAX) 17 gram/dose powder Take 17 g by mouth daily. 1 tablespoon with 8 oz of water daily 
Qty: 289 g, Refills: 0  
  
umeclidinium-vilanterol (ANORO ELLIPTA) 62.5-25 mcg/actuation inhaler Take 1 Puff by inhalation daily. Qty: 1 Inhaler, Refills: 0 BD INSULIN SYRINGE ULTRA-FINE 1 mL 31 gauge x 5/16 syrg   
  
rosuvastatin (CRESTOR) 5 mg tablet TAKE ONE TABLET NIGHTLY Qty: 90 Tab, Refills: 3  
  
albuterol (PROAIR HFA) 90 mcg/actuation inhaler INHALE 2 PUFFS EVERY 4 HOURS AS NEEDED FOR WHEEZING Qty: 1 Inhaler, Refills: 5  
  
oxybutynin (DITROPAN) 5 mg tablet Take 5 mg by mouth two (2) times a day. allopurinol (ZYLOPRIM) 100 mg tablet Take 100 mg by mouth daily. insulin lispro (HUMALOG) 100 unit/mL injection To be given 15 min before meals: < 150 - None, 151-200 - 2 u, 201-250 - 4 u, 251-300 - 6 u, 301-350 - 8 u, 351-400 - 10 u, >400 - 12 u Qty: 1 Vial, Refills: 0 Associated Diagnoses: Type 2 diabetes mellitus with stage 3 chronic kidney disease, with long-term current use of insulin ([facility])  
  
insulin syringe,safetyneedle 1 mL 30 gauge x 5/16\" syrg As directed Qty: 50 Each, Refills: 0 Nebulizer Accessories Kit Use with nebulizer machine Qty: 1 Kit, Refills: prn  
 Associated Diagnoses: COPD (chronic obstructive pulmonary disease) ([facility])  
  
sertraline (ZOLOFT) 50 mg tablet Take 50 mg by mouth daily. Indications: Bipolar Depression Current Facility-Administered Medications Medication Dose Route Frequency  potassium bicarb-citric acid (EFFER-K) tablet 40 mEq  40 mEq Per NG tube Q4H  
 [START ON 3/11/2020] levothyroxine (SYNTHROID) tablet 100 mcg  100 mcg Oral 6am  
 [START ON 3/11/2020] vancomycin trough due 03/11 at 1130 (draw before dose)  1 Each Other ONCE  
 amiodarone (CORDARONE) tablet 200 mg  200 mg Oral BID  ELECTROLYTE REPLACEMENT PROTOCOL - Potassium Standard Dosing   1 Each Other PRN  
 ELECTROLYTE REPLACEMENT PROTOCOL - Potassium Standard  Dosing Details for Fluid Restricted Patients  1 Each Other PRN  
 sodium chloride 3% hypertonic nebulizer soln  4 mL Nebulization BID RT  
 ELECTROLYTE REPLACEMENT PROTOCOL - Potassium Standard Dosing   1 Each Other PRN  
 vancomycin (VANCOCIN) 1,000 mg in 0.9% sodium chloride (MBP/ADV) 250 mL adv  1,000 mg IntraVENous Q12H  hydrocortisone Sod Succ (PF) (SOLU-CORTEF) injection 50 mg  50 mg IntraVENous DAILY  fentaNYL citrate (PF) injection  mcg   mcg IntraVENous Q2H PRN  
 aspirin chewable tablet 81 mg  81 mg Oral DAILY  insulin glargine (LANTUS) injection 16 Units  16 Units SubCUTAneous DAILY  polyethylene glycol (MIRALAX) packet 17 g  17 g Oral DAILY  multivit-folic acid-herbal 182 (WELLESSE PLUS) oral liquid 30 mL  30 mL Per NG tube DAILY  piperacillin-tazobactam (ZOSYN) 4.5 g in 0.9% sodium chloride (MBP/ADV) 100 mL MBP  4.5 g IntraVENous Q6H  
 acetaminophen (TYLENOL) solution 650 mg  650 mg Per NG tube Q4H PRN  
 diphenhydrAMINE (BENADRYL) injection 25 mg  25 mg IntraVENous Q4H PRN  
 sodium chloride (NS) flush 5-10 mL  5-10 mL IntraVENous PRN  
 sodium chloride (NS) flush 5-40 mL  5-40 mL IntraVENous PRN  pantoprazole (PROTONIX) 40 mg in 0.9% sodium chloride 10 mL injection  40 mg IntraVENous Q12H  
 albuterol-ipratropium (DUO-NEB) 2.5 MG-0.5 MG/3 ML  3 mL Nebulization Q4H RT  
 albuterol (ACCUNEB) nebulizer solution 1.25 mg  1.25 mg Nebulization Q6H PRN  
  budesonide (PULMICORT) 500 mcg/2 ml nebulizer suspension  500 mcg Nebulization BID RT  
 insulin lispro (HUMALOG) injection   SubCUTAneous Q6H  
 glucose chewable tablet 16 g  4 Tab Oral PRN  
 glucagon (GLUCAGEN) injection 1 mg  1 mg IntraMUSCular PRN  
 dextrose (D50W) injection syrg 12.5-25 g  25-50 mL IntraVENous PRN Allergies: Moxifloxacin; Pcn [penicillins]; and Sulfa (sulfonamide antibiotics) Temp (24hrs), Av.4 °F (36.9 °C), Min:97.3 °F (36.3 °C), Max:100 °F (37.8 °C) Visit Vitals /67 Pulse (!) 102 Temp 99.6 °F (37.6 °C) Resp 27 Ht 5' 4\" (1.626 m) Wt 88 kg (194 lb 0.1 oz) LMP 2012 (Exact Date) SpO2 100% BMI 33.30 kg/m² ROS: 12 point review of systems obtained in details pertinent positive as mentioned in history of present illness, otherwise negative Physical Exam: 
 
General:   Laying on the bed, alert awake oriented x3, appears comfortable Head:  Normocephalic, without obvious abnormality, atraumatic. Eyes:  Conjunctivae/corneas clear. PERRL, Nose: Nares normal. Septum midline. Mucosa normal. .  
Throat: Lips, mucosa, and tongue normal. Teeth and gums normal.  
Neck: Supple, symmetrical, trachea midline, no adenopathy, no carotid bruit and no JVD. Lungs:    Clear to auscultation bilaterally; no wheezes/rales noted. Heart:  RRR, S1, S2 normal, no m/r/g Abdomen:   Soft,  no significant tenderness. Stool leaking from around the colostomy bag. +midline surgical incision, dressing c/d/i. Present bowel sounds. No masses,  No organomegaly. Extremities: Extremities normal, atraumatic, no cyanosis or edema. Pulses: 2+ and symmetric all extremities. Skin: Skin color, texture, turgor normal. No rashes or lesions Neurologic:  Alert awake oriented x3. No gross motor or sensory deficits noted Labs: Results:  
Chemistry Recent Labs  
  03/10/20 
7282 20 
0530 20 
1630 20 
0420 GLU 98 106*  --  147* * 149*  --  148*  
K 2.5* 2.9* 3.5 3.0*  
 113*  --  113* CO2 34* 30  --  31  
BUN 8 10  --  13  
CREA 0.56* 0.59*  --  0.67 CA 8.1* 8.0*  --  7.8* AGAP 5 6  --  4  
BUCR 14 17  --  19  
AP 51 54  --  58  
TP 5.5* 5.4*  --  5.2* ALB 1.5* 1.5*  --  1.6*  
GLOB 4.0 3.9  --  3.6 AGRAT 0.4* 0.4*  --  0.4* CBC w/Diff Recent Labs  
  03/10/20 
0609 03/09/20 
0530 03/08/20 
0420 WBC 6.4 9.2 10.9 RBC 3.12* 3.23* 3.46* HGB 8.3* 8.6* 9.2* HCT 26.7* 27.8* 29.7*  
 167 150 GRANS 54 79* 80* LYMPH 27 9* 12* EOS 0 0 0 Microbiology Recent Labs 03/09/20 
1159 03/09/20 
1154 CULT NO GROWTH AFTER 18 HOURS NO GROWTH AFTER 18 HOURS  
  
 
 
RADIOLOGY: 
 
All available imaging studies/reports in [facility] for this admission were reviewed Dr. Arcadio Gutierrez, Infectious Disease Specialist 
[phone] March 10, 2020 
8:38 AM

## 2020-03-10 NOTE — ROUTINE PROCESS
Received pt in bed watching tV. Voiced no complaints. Call bell within reach. Bed low and locked. Will continue to monitor. Bedside and Verbal shift change report given to Juana Skaggs (oncoming nurse) by Lea Kearns RN 
 (offgoing nurse). Report included the following information SBAR, Kardex, Intake/Output and MAR.

## 2020-03-10 NOTE — PROGRESS NOTES
[facility]. Pulmonary, Critical Care, and Sleep Medicine Name: Nirmal Trevino MRN: [id number] : 1955 Hospital: [address] [place] [address] Dr Date: 3/10/2020  Admission Date: 2020 Chart and notes reviewed. Data reviewed. I have evaluated all findings. [x]I have reviewed the flowsheet and previous days notes. Interval HPI:Patient is a 62 y. o. female with PMH of COPD (on 3LPM NC home O2), Sarcoidosis, HTN, T2DM,  Hep C, recent Spine Thoracic Laminectomy T6-T11 with Fusion (20) 2/2 compression fx of T9-T10 and spinal cord compression at T9 and T10 and spinal cord edema with subsequent paraparesis, who presented to [facility] ED on 20 c/o acute abdominal pain, SOB with associated constipation x4-5 days PTA, likely 2/2 aforementioned spinal surgery on 19. CT Chest/Abd/Pelvis showed intraperitoneal free air consistent with perforated viscus. Required intubation, Underwent emergent ex lap , was found to have sigmoid colon perforation, 2/2 constipation w/ large stool burden spilling into the peritoneum per Dr. Yesi Guido. Dr. Yesi Guido took pt back to OR on 3/1 for abdominal washout and closure. ICU stay complicated by persistent vasopressor requirement and afibb. Subjective 03/10/20 Overnight events: tolerating salter NC 4L well. Mentation/Activity: AxO x3, following commands Respiratory/ Secretions: non-tenacious, non-bloody Hemodynamics: H/H stable, off pressors Diet: Tube feeds and p.o. Potassium 2.5 today ROS:Review of systems not obtained due to patient factors. Events and notes from last 24 hours reviewed. Patient Active Problem List  
Diagnosis Code  Diabetic neuropathy associated with type 2 diabetes mellitus (HCC) E11.40  Venous insufficiency I87.2  Obesity, Class I, BMI 30-34.9 E66.9  Chronic back pain M54.9, G89.29  
 Generalized osteoarthritis of multiple sites M15.9  Bipolar affective disorder ([facility] [address] F31.9  Abnormally low high density lipoprotein (HDL) cholesterol with hypertriglyceridemia E78.6, E78.1  Diastolic dysfunction without heart failure I51.89  Chronic obstructive pulmonary disease (COPD) ([facility] [address] J44.9  Hypertensive heart disease without heart failure I11.9  Depression F32.9  History of back injury Z87.828  Type 2 diabetes mellitus with diabetic neuropathy, with long-term current use of insulin ([facility]) E11.40, Z79.4  Anxiety F41.9  Wears glasses Z97.3  History of hepatitis C Z86.19  
 Sickle cell trait ([facility]) D57.3  History of acute renal failure Z87.448  Hypothyroidism E03.9  Recurrent genital herpes A60.00  Sarcoidosis D86.9  Abscess of right arm L02.413  Hypoxemia requiring supplemental oxygen R09.02, Z99.81  
 Abnormal nuclear stress test R94.39  
 Cellulitis and abscess of hand L03.119, L02.519  
 Cellulitis and abscess of foot L03.119, L02.619  
 Cellulitis of arm, right L03. 113  
 Olecranon bursitis of right elbow M70.21  
 Contusion of left elbow S50. 02XA  Intravenous drug user F19.90  Right Achilles tendinitis M76.61  
 History of penicillin allergy Z88.0  Falls frequently R29.6  Gastroesophageal reflux disease with hiatal hernia K21.9, K44.9  Memory difficulty R41.3  Mixed [facility] disease [facility] M35.1  Impaired mobility and ADLs Z74.09  
 Hyperuricemia E79.0  History of vitamin D deficiency Z86.39  
 Urge urinary incontinence N39.41  Spinal cord compression ([facility]) G95.20  Compression fracture of body of thoracic vertebra ([facility]) S22.000A  Status post laminectomy with spinal fusion Z98.1  Acute blood loss as cause of postoperative anemia D62  
 History of fracture due to fall Z87.81  Chronic respiratory failure with hypoxia ([facility]) J96.11  
 Cellulitis of right forearm L03. 113  
 Gout M10.9  Menopause Z78.0  Long term current use of aspirin Z79.82  
 Opioid dependence ([facility]) F11.20  Tobacco use disorder F17.200  Sepsis ([address] A41.9  Perforated viscus R19.8  Septic shock (HCC) A41.9, R65.21 Vital Signs: 
Visit Vitals /67 Pulse (!) 102 Temp 99.6 °F (37.6 °C) Resp 27 Ht 5' 4\" (1.626 m) Wt 88 kg (194 lb 0.1 oz) LMP 2012 (Exact Date) SpO2 100% BMI 33.30 kg/m² O2 Device: Nasal cannula O2 Flow Rate (L/min): 6 l/min Temp (24hrs), Av.8 °F (37.1 °C), Min:97.3 °F (36.3 °C), Max:100.8 °F (38.2 °C) Intake/Output:  
Last shift:      03/10 07 - 03/10 1900 In: 5 [P.O.:120] Out: 300 Last 3 shifts: 1901 - 03/10 0700 In: 4037.2 [I.V.:1517.2] Out: 9353 [Urine:4290; LTBZEP:401] Current Facility-Administered Medications Medication Dose Route Frequency  potassium bicarb-citric acid (EFFER-K) tablet 40 mEq  40 mEq Per NG tube Q4H  
 amiodarone (CORDARONE) tablet 200 mg  200 mg Oral BID  sodium chloride 3% hypertonic nebulizer soln  4 mL Nebulization BID RT  
 vancomycin (VANCOCIN) 1,000 mg in 0.9% sodium chloride (MBP/ADV) 250 mL adv  1,000 mg IntraVENous Q12H  hydrocortisone Sod Succ (PF) (SOLU-CORTEF) injection 50 mg  50 mg IntraVENous DAILY  aspirin chewable tablet 81 mg  81 mg Oral DAILY  levothyroxine (SYNTHROID) injection 75 mcg  75 mcg IntraVENous DAILY  insulin glargine (LANTUS) injection 16 Units  16 Units SubCUTAneous DAILY  polyethylene glycol (MIRALAX) packet 17 g  17 g Oral DAILY  multivit-folic acid-herbal 067 (WELLESSE PLUS) oral liquid 30 mL  30 mL Per NG tube DAILY  piperacillin-tazobactam (ZOSYN) 4.5 g in 0.9% sodium chloride (MBP/ADV) 100 mL MBP  4.5 g IntraVENous Q6H  
 pantoprazole (PROTONIX) 40 mg in 0.9% sodium chloride 10 mL injection  40 mg IntraVENous Q12H  
 albuterol-ipratropium (DUO-NEB) 2.5 MG-0.5 MG/3 ML  3 mL Nebulization Q4H RT  
 budesonide (PULMICORT) 500 mcg/2 ml nebulizer suspension  500 mcg Nebulization BID RT  
  insulin lispro (HUMALOG) injection   SubCUTAneous Q6H Telemetry: [x]Sinus []A-flutter []Paced []A-fib []Multiple PVCs Physical Exam:  
  
General: Awake and alert, follows commands HEENT:  Anicteric sclerae; pink palpebral conjunctivae; mucosa moist 
Resp:  Symmetrical chest expansion, no accessory muscle use; good airway entry; no rales/ wheezing/ rhonchi noted CV:  S1, S2 present; regular rate and rhythm GI:  Abdomen soft, non-tender; (+) active bowel sounds Extremities:  +2 pulses on all extremities; no edema/ cyanosis/ clubbing noted Skin:  Warm; no rashes/ lesions noted, normal turgor/cap refill Neurologic:  Non-focal, follows commands Devices:  NGT, R IJ CVL, Renae DATA: 
MAR reviewed and pertinent medications noted or modified as needed Labs: 
Recent Labs  
  03/10/20 
6442 03/09/20 
0530 03/08/20 
0420 WBC 6.4 9.2 10.9 HGB 8.3* 8.6* 9.2* HCT 26.7* 27.8* 29.7*  
 167 150 Recent Labs  
  03/10/20 
2932 03/09/20 
0530 03/08/20 
1630 03/08/20 
0420 * 149*  --  148*  
K 2.5* 2.9* 3.5 3.0*  
 113*  --  113* CO2 34* 30  --  31  
GLU 98 106*  --  147* BUN 8 10  --  13  
CREA 0.56* 0.59*  --  0.67 CA 8.1* 8.0*  --  7.8*  
MG 1.6 2.0  --  2.0 PHOS 2.7 3.2  --  2.4* ALB 1.5* 1.5*  --  1.6* SGOT 21 27  --  32 ALT 17 18  --  19 No results for input(s): PH, PCO2, PO2, HCO3, FIO2 in the last 72 hours. No results for input(s): FIO2I, IFO2, HCO3I, IHCO3, HCOPOC, PCO2I, PCOPOC, IPHI, PHI, PHPOC, PO2I, PO2POC in the last 72 hours. No lab exists for component: IPOC2 Imaging: 
[x]   I have personally reviewed the patients radiographs and reports XR Results (most recent): 
[place] [facility] [place]20 XR CHEST PORT Narrative EXAM: CHEST  CPT CODE: 87359 CLINICAL INDICATION/HISTORY: Intubated. COMPARISON: 7 March 2020. TECHNIQUE: Single AP portable view of chest at 0416. FINDINGS: There is underpenetrated technique in the apices are partially 
obscured by the patient's head. There is a nasogastric tube with the tip below 
the diaphragm and off the bottom of the image at the previously noted 
endotracheal tube is not definitely seen. There is a right internal jugular 
central venous catheter with tip in the distal superior vena cava. Hazy 
increasing opacity is seen in both lung bases with obscuration hemidiaphragms as 
well as increased hilar interstitial prominence, most consistent with 
interstitial infiltrate/edema and basilar effusions and atelectasis. The 
cardiomediastinal silhouette is normal.  The bones and soft tissues are 
unremarkable except for posterior fusion hardware at the mid thoracic spine, 
unchanged. Impression IMPRESSION: An endotracheal tube is not definitely seen but is somewhat underpenetrated 
study. Persistent bilateral basilar infiltrates edema and probable 
effusion/atelectasis. CT Results (most recent): 
Results from [facility] [place]counter encounter on 02/29/20 CT CHEST ABD PELV W CONT Narrative CT SCAN OF THE CHEST, ABDOMEN AND PELVIS, WITH CONTRAST INDICATION: Above. Arrived with shortness of breath and abdominal pain. Septic 
shock. Hypoxia. History of sarcoidosis/COPD. Altered. Abdominal distention and 
tympani. Intraperitoneal free air on chest radiograph. Perforated viscus. COMPARISON: Chest CT 1/21/2010. No prior abdominopelvic CT in PACS. Report is 
noted in the electronic medical record (care everywhere) of previous noncontrast 
enhanced abdominopelvic CT performed elsewhere 7/21/2014. TECHNIQUE: With IV administration of 70 mL Isovue-300, without administration of 
oral contrast, helically acquired serial axial CT images through the chest, 
abdomen and pelvis were obtained. Additional coronal and sagittal reformation 
images were also performed. All CT scans at this facility are performed using dose optimization technique as 
appropriate to the performed exam, to include automated exposure control, 
adjustment of the mA and/or kV according to patient's size (Including 
appropriate matching for site-specific examinations), or use of iterative 
reconstruction technique. FINDINGS: 
 
CT chest:  
 
Endotracheal tube tip is in place cephalad to the level of the alyssa. Esophagogastric tube is noted, tip in the mid stomach. Severe pulmonary emphysematous disease again seen with extensive bullous changes 
most conspicuous in the upper lungs. Scattered scarring. There has been interval 
development of consolidative appearing lung opacities in the lower lobes 
bilaterally consistent with airspace disease likely in addition to atelectasis. The small stable subpleural pulmonary nodule described in the right lower lobe 
on the recent chest CT is again seen, slightly better visualized on the current 
study measuring approximately 4 mm, unchanged compared to the CT of 2/6/2018 
(axial 34). Multifocal chronic nodular pleuro-parenchymal scarring in the left 
upper lobe without significant interval change compared to the preceding chest 
CT or study of 2/6/2018. No definite suspicious new pulmonary nodule/mass identified compared to the 
preceding CT. No evidence of pathologic lymph node enlargement is seen. No evidence of pleural or significant pericardial effusion. CT abdomen/pelvis:  
 
Small ascites, best seen around the liver and in the anterior pelvis adjacent to 
the distal descending and sigmoid colon. The spleen is heterogeneous in attenuation and demonstrates several rounded 
hypodensities as detailed on the recent chest CT of 1/21/2020. Indeterminate attenuation masses in the kidneys bilaterally, 2.7 cm right kidney 
upper to midpole laterally, 2.1 cm left kidney interpolar region posteriorly. Further evaluation recommended, beginning with ultrasound might be helpful when 
clinically feasible if the corresponding lesions can be identified 
sonographically. Additional smaller subcentimeter renal hypodensities 
bilaterally, possibly cysts, too small to be specifically characterized. The liver, gallbladder, pancreas, and adrenal glands appear unremarkable. Scattered calcifications in the abdominal aorta and its major branches. The 
abdominal aorta enhances normally without abdominal aortic aneurysm. A few scattered small subcentimeter short axis lymph nodes bilaterally in the 
pelvis or retroperitoneum. No evidence of pathologic lymph node enlargement is 
seen. Moderate quantity of intraperitoneal free air in the nondependent anterior 
abdomen, consistent with perforated viscus. Additional scattered foci of free 
air elsewhere in the peritoneum and mesentery, including in the right upper 
quadrant near the gallbladder/duodenum, and scattered bilaterally in the 
mesenteric/peritoneal fat of the upper and lower abdomen. Grouped small foci of 
intraperitoneal free air are noted close to the colonic wall along the 
left/anterior side of the sigmoid colon (reference axial 112-119). The left 
hemicolon is diffusely mildly distended to the rectum containing gas, stool, and 
hyperdense material. Clinical correlation might be helpful regarding recent 
ingestion of hyperdense material. The rectum measures approximately 8.5 cm in 
caliber. The sigmoid measures approximately 7.3 cm in caliber on axial image 115. The site of the or free viscus is uncertain. Common sites could include 
duodenal or gastric perforation such as may be seen due to perforated ulcer.  
However, there is small focal hypoattenuation along the wall of the sigmoid 
colon on series 2 axial images 111-113 which could potentially reflect a small 
mural defect such as could be seen related to constipation, stercoral colitis, 
 diverticular disease. The appendix is seen within the ascites in the right lower quadrant, assessment 
for stranding in the periappendiceal fat limited by the ascites, without gross 
abnormal thickening of the appendix seen. No gas within the appendix. The right 
hemicolon appears grossly unremarkable. There is mild diffuse mural thickening of small bowel loops and mild prominence 
of enhancement, nonspecific. No definite associated pneumatosis. No portal 
venous gas is seen. The SMA/SMV appear to maintain usual orientation. Broadly 
speaking differential considerations could include inflammatory, infectious, 
ischemic. Clinical correlation is recommended including with lactate 
measurement. Conceivably sequela of peritonitis in the setting of bowel 
perforation? Uterus is present. Small gas is noted within the uterus and vagina, nonspecific, 
can be seen as a physiologic finding. On review of bone windows, there is a superior endplate compression fracture 
with mildly to moderately decreased vertebral body height at L2. Bilateral 
spinal fusion rods spanning from I6-J0-G17-T12. Severe compression fracture 
deformity again seen at T9. Compression fracture with rather pronounced 
decreased vertebral body height again seen at T5. Mild endplate indentations at 
several other levels including T4, T6, T10. The posterior midline fluid 
collection described on the preceding study was better visualized on the 
preceding study, extensive metallic hardware artifacts noted. Impression Impression: Moderate quantity of intraperitoneal free air. Findings are consistent with 
perforated viscus. The site of perforation is uncertain, as discussed above, 
possibly the sigmoid colon where there is apparent small subtle focal 
hypoattenuation along the wall as detailed above (reference axial images 111-113) close to region where several foci of free air are grouped. The left hemicolon is mildly distended and filled with hyperdense appearing stool. Mild diffuse mural thickening and enhancement of the small bowel as described. Small volume of ascites best seen around the liver and in the pelvis adjacent to 
the sigmoid. Bibasilar consolidations, right greater than left, suspicious for airspace 
disease/pneumonia. Indeterminate attenuation bilateral renal masses, follow-up evaluation 
recommended. Splenic hypodensities again seen as described on recent chest CT. Intubated. Severe pulmonary emphysematous disease and fibrotic changes. Multiple otherwise nonacute findings as detailed above. I have discussed these results directly with Dr. Marjorie Tang in the ED at 12:20 p.m. 
in addition to the patient's consulted surgeon at 12:25 PM. IMPRESSION:  
· Acute on Chronic Respiratory Failure - Emergently intubated in ED for airway protection-liberated from ventilator and extubated 3/8/2020 . chronically on 3L NC at home. · Acute Sigmoid Colon Perforation w/ peritonitis - s/p emergent ex-lap w/ drainage of intraabdominal stool and collections; sigmoid colectomy with Dr. Kevin Valderrama on 02/29/20. Subsequent washout and abdominal closure on 03/01/20.  · Staph bacteremia - Grew on BCx collected 3/3. GNR bacteremia on 2/29, no regrowth · Hypokalemia - Likely 2/2 diuresis. · Hypophosphatemia- ?refeeding syndrome- resolved · Metabolic alkalosis ?diuresis / GI loss · Intermittent Tachyarrhythmia - afibb/flutter - improved · Septic Shock due to peritonitis, UTI · Bilateral PNA - Sputum Cx collected 3/1 grew Moraxella catarrhalis. · Troponemia - demand ischemia, not an MI.  
· Hypothyroidism · ELIF - Likely pre-renal, 2/2 above. Initial ELIF resolved. Pt w/ mild increase in BUN/Cr w/ diuresis.  
· UTI - UCx grew E.coli.  
· T2DM  
· Paraparesis, in wheelchair: Recent Hx of Spine Thoracic Laminectomy T6-T11 with Fusion (12/11/20) with Dr. Chino Mode 2/2 Acute compression fx of T9-T10 and spinal cord compression at T9 and T10 and spinal cord edema with subsequent paraparesis. · Hx of Sarcoidosis - Chronic steroid use. · Hx of COPD, on chronic 3L NC, FEV1 51% in 2012, unable to perform repeat studies since  - Follows with Dr. Hany Torres in clinic. · Hx of Cocaine and Heroin Abuse - on methadone. Patient Active Problem List  
Diagnosis Code  Diabetic neuropathy associated with type 2 diabetes mellitus ([facility]) E11.40  Venous insufficiency I87.2  Obesity, Class I, BMI 30-34.9 E66.9  Chronic back pain M54.9, G89.29  
 Generalized osteoarthritis of multiple sites M15.9  Bipolar affective disorder ([facility] [address] F31.9  Abnormally low high density lipoprotein (HDL) cholesterol with hypertriglyceridemia E78.6, E78.1  Diastolic dysfunction without heart failure I51.89  Chronic obstructive pulmonary disease (COPD) ([facility] [address] J44.9  Hypertensive heart disease without heart failure I11.9  Depression F32.9  History of back injury Z87.828  Type 2 diabetes mellitus with diabetic neuropathy, with long-term current use of insulin ([facility]) E11.40, Z79.4  Anxiety F41.9  Wears glasses Z97.3  History of hepatitis C Z86.19  
 Sickle cell trait ([facility]) D57.3  History of acute renal failure Z87.448  Hypothyroidism E03.9  Recurrent genital herpes A60.00  Sarcoidosis D86.9  Abscess of right arm L02.413  Hypoxemia requiring supplemental oxygen R09.02, Z99.81  
 Abnormal nuclear stress test R94.39  
 Cellulitis and abscess of hand L03.119, L02.519  
 Cellulitis and abscess of foot L03.119, L02.619  
 Cellulitis of arm, right L03. 113  
 Olecranon bursitis of right elbow M70.21  
 Contusion of left elbow S50. 02XA  Intravenous drug user F19.90  Right Achilles tendinitis M76.61  
 History of penicillin allergy Z88.0  Falls frequently R29.6  Gastroesophageal reflux disease with hiatal hernia K21.9, K44.9  Memory difficulty R41.3  Mixed connective tissue disease ([facility] [address] M35.1  Impaired mobility and ADLs Z74.09  
 Hyperuricemia E79.0  History of vitamin D deficiency Z86.39  
 Urge urinary incontinence N39.41  Spinal cord compression ([facility]) G95.20  Compression fracture of body of thoracic vertebra ([facility]) S22.000A  Status post laminectomy with spinal fusion Z98.1  Acute blood loss as cause of postoperative anemia D62  
 History of fracture due to fall Z87.81  Chronic respiratory failure with hypoxia ([facility]) J96.11  
 Cellulitis of right forearm L03. 113  
 Gout M10.9  Menopause Z78.0  Long term current use of aspirin Z79.82  
 Opioid dependence ([facility]) F11.20  Tobacco use disorder F17.200  Sepsis [facility] A41.9  Perforated viscus R19.8  Septic shock ([facility]) A41.9, R65.21  
 
  
RECOMMENDATIONS:  
 
Pulm: Aspiration precautions, HOB>30'. On NC 4L. Titrate goal SPO2 >90%, on home O2 at 3 L. Continue duo nebs, Solu-Cortef 50 mg daily-we will start maintenance prednisone dose once able to completely take p.o. meds ID-continue Vanco and Zosyn for peritonitis Surgical-continue postop wound care, ostomy care, wound VAC Nutrition-advance as tolerated. Dietary service following Electrolytes-monitor and replace Will need OT PT, speech therapy care management for next level of care Stress ulcer and DVT prophylaxis High complexity decision making was performed during this consultation and evaluation. [x]       Pt is at high risk for further organ failure and dysfunction. Satnam Rosa MD 
03/10/20 Pulmonary, Critical Care Medicine [facility]

## 2020-03-10 NOTE — PROGRESS NOTES
NUTRITION Nutrition Consult: General Nutrition Management & Supplements RECOMMENDATIONS / PLAN:  
 
- Discontinue tube feeding and remove NGT per MD. 
- Discontinue scheduled miralax. - Provide 2 gm IV magnesium replacement. - Change from liquids to tablet multivitamin. 
- Add oral nutritional supplements: Magic Cup TID. - Monitor po intake and diet tolerance. - Continue RD inpatient monitoring and evaluation. NUTRITION INTERVENTIONS & DIAGNOSIS:  
 
- Enteral nutrition: discontinue - Meals/snacks: modified composition, monitor po intake - Medical food supplement therapy: initiate - Vitamin and mineral supplement therapy: multivitamin- modify, replace magnesium    
- Nutrition related medication management: miralax- discontinue - Collaboration and referral of nutrition care: patient discussed with nursing, obtain verbal order from Dr Zane Alcantara for the nutrition interventions above Nutrition Diagnosis: Inadequate oral intake related to decreased appetite, weakness, altered GI function as evidenced by pt consuming 50% or less of recent meals and requires assistance eating. Altered GI function related to altered GI function and structure, bowel regimen and laxative administration as evidenced by loose stool per rectum requiring FMS placement, also with loose output from colostomy. ASSESSMENT:  
 
3/10: Tolerating bolus feeds, hypernatremia improving and replacement given for K of 2.5 today. Requires assistance eating, consuming 30-50% of recent meals and Surgery recommending NGT removal as soon as possible- will remove and discontinue supplemental tube feeding despite inadequate meal intake per discussion with MD. Loose stool per rectum and FMS placed overnight in addition to colostomy output and 3.4 L UOP total yesterday after diuresis- contributing to hypokalemia with low normal magnesium- to be replaced to assist with K repletion. 3/9: Extubated 3/7, NGT keep in place post extubation and tube feeding at goal. Diet started after swallow evaluation this morning, pt lethargic with anticipated poor meal intake- will continue feeds, change to bolus regimen and monitor adequacy of meal intake per MD.  
3/7: Tolerating feeds at 30 mL/hr, residual  mL. Receiving metoclopramide & miralax held this morning after large loose stool filled colostomy bag. 
3/6: Feeds advanced to 20 mL/hr then rate dropped back down to 10 mL/hr after 200 mL residual. Pt with chronic constipation, hx of narcotic use, partial paralysis and prolonged ileus anticipated per MD. Lantus increased for hyperglycemia, hypernatremia improving. 3/5: Tolerating trickle feeds, hypernatremia noted. 3/4: Minimal out from colostomy with 1100 mL out from NGT- will start trial of trickle feeding and monitor. 3/3: Remains intubated, no nausea/vomiting, no ostomy function. No output documented from NGT.  
3/2: S/p ex lap, descending colectomy and creation of colostomy on 3/1 then re-ex lap, drainage of intra-abdominal stool collection, descending colectomy with mobilization of the splenic flexure and transverse colostomy. Remains intubated on the vent postop. Nutritional intake adequate to meet patients estimated nutritional needs:  Yes Tube Feeding: Glucerna 1.5, 300 mL 4 times daily via NGT Water Flushes: 200 mL q 4 hours Residuals: 0 mL Diet: DIET DYSPHAGIA PUREED (NDD1)  3 Honey/3 Moderately Thick DIET TUBE FEEDING Please provide 300 mL bolus feed (given via pump over 1 hour) at 9:00, 13:00, 17:00 and 21:00. Provide 200 mL water flush after each bolus plus 2 additional free water flushes of 200 mL at 01:00 and 05:00. DIET NUTRITIONAL SUPPLEMENTS Breakfast, Dinner; [employer] Food Allergies: NKFA Current Appetite: Poor Appetite/meal intake prior to admission: Fair, consuming soft foods Feeding Limitations:  [x] Swallowing difficulty: SLP following    [] Chewing difficulty    [x] Other: weakness, respiratory status- recent extubation Current Meal Intake:  
Patient Vitals for the past 100 hrs: 
 % Diet Eaten 03/10/20 0900 50 % BM: 3/10, colostomy (350 mL output x past 24 hours); FMS placed overnight Skin Integrity: abdominal surgical incision with wound VAC Edema:   [] No     [x] Yes Pertinent Medications: Reviewed: SSI, steroid, 16 units lantus, pantoprazole, levothyroxine po, fentanyl, 20 mEq KCl IV, 40 mEq K bicarb po Recent Labs  
  03/10/20 
9317 03/09/20 
0530 03/08/20 
1630 03/08/20 
0420 * 149*  --  148*  
K 2.5* 2.9* 3.5 3.0*  
 113*  --  113* CO2 34* 30  --  31  
GLU 98 106*  --  147* BUN 8 10  --  13  
CREA 0.56* 0.59*  --  0.67 CA 8.1* 8.0*  --  7.8*  
MG 1.6 2.0  --  2.0 PHOS 2.7 3.2  --  2.4* ALB 1.5* 1.5*  --  1.6* SGOT 21 27  --  32 ALT 17 18  --  19 Intake/Output Summary (Last 24 hours) at 3/10/2020 1258 Last data filed at 3/10/2020 0900 Gross per 24 hour Intake 2210 ml Output 2852 ml Net -642 ml Anthropometrics: 
Ht Readings from Last 1 Encounters:  
03/03/20 5' 4\" (1.626 m) Last 3 Recorded Weights in this Encounter 03/08/20 0300 03/08/20 0306 03/09/20 3463 Weight: 86.9 kg (191 lb 9.3 oz) 91.1 kg (200 lb 13.4 oz) 88 kg (194 lb 0.1 oz) Body mass index is 33.3 kg/m². Obese, Class I Weight History: previously reported intentional weight loss and previous usual weight of 230 lb; patient reports recent usual weight of 175 lb prior to admission Weight Metrics 3/9/2020 2/28/2020 1/22/2020 1/17/2020 1/2/2020 12/16/2019 12/14/2019 Weight 194 lb 0.1 oz - 171 lb 11.2 oz - 172 lb 6.4 oz - 178 lb 9.6 oz BMI 33.3 kg/m2 28.57 kg/m2 - 28.57 kg/m2 - 28.69 kg/m2 - Some encounter information is confidential and restricted. Go to Review Flowsheets activity to see all data. Admitting Diagnosis: Septic shock ([facility] [address] [A41.9, R65.21] Perforated viscus [R19.8] Pertinent PMHx: COPD, HTN, DM, hepatitis C, sarcoidosis, methadone use, GERD with hiatal hernia, hypothyroidism Procedures: Spine Thoracic Laminectomy T6-T11 with Fusion (12/11/20) 2/2 compression fx of T9-T10 and spinal cord compression at T9 and T10 and spinal cord edema with subsequent paraparesis Education Needs:        [x] None identified  [] Identified - Not appropriate at this time  []  Identified and addressed - refer to education log Learning Limitations:   [x] None identified  [] Identified:  
Cultural, Temple & ethnic food preferences:  [x] None identified    [] Identified and addressed ESTIMATED NUTRITION NEEDS:  
 
Calories: [address] kcal (MSJx1.2-1.5) based on  [x] Actual BW 88 kg     [] IBW Protein:  gm (1-1.5 gm/kg) based on  [x] Actual BW      [] IBW Fluid: 1 mL/kcal 
  
MONITORING & EVALUATION:  
  
Nutrition Goal(s):  
- PO nutrition intake will meet >75% of patient estimated nutritional needs within the next 7 days. Outcome: New/Initial goal   
- Improvement in transit time, patient will have 200-600 mL/d colostomy output within the next 7 days. Outcome: New/Initial goal    
 
Monitoring:   [x] Food and nutrient intake   [x] Food and nutrient administration  [x] Comparative standards   [x] Nutrition-focused physical findings   [x] Anthropometric Measurements   [x] Treatment/therapy   [x] Biochemical data, medical tests, and procedures Previous Recommendations (for follow-up assessments only): Implemented Discharge Planning: Nutritional discharge needs unknown at this time. Participated in care planning, discharge planning, & interdisciplinary rounds as appropriate. Tamara Thomson RD, [facility] Pager: [phone]

## 2020-03-10 NOTE — PROGRESS NOTES
0800:  Report received, care assumed. Complete assessment performed. AAOx3. Repositioned for breakfast. HOB remains elevated with full fall and aspiration precautions in effect. CNA fed patient breakfast tray, pt tolerated well. 4994:  Pain medication given as requested by patient, see mar and flowsheets. Monitor. 1045:  Patient sister at bedside, pt gave this  RN permission to update sister. [id number]: After turning and repositioning with [facility] turn team, noted diarrhea leaking around FMS. Skin cleansed, linen changed. Rousseau discontinued per ID provider orders. With turning, colostomy wafer became loose and leaked. Colostomy care performed including removal current wafer, application new wafer and bag. FMS leaked again; with cleaning skin and repositioning FMS, noted scant solid stool. FMS discontinued as appears scant solid stool blocking watery diarrhea flow thru FMS. Colostomy bag leaked again with turning. Colostomy care delivered. Skin cleansed. Linen changed. Pure wick for urine collection placed. Repositioned with HOB remains elevated. Patient tolerated care well. Supine at this time. Daughter in to visit, updated to status and plan of care, she verbalizes understanding all info; supportive and encouraging to patient. 1800: Tolerating tube feeds without residuals. Oral intake diminished throughout the day as \"mouth is sore\". Took 10% Magic Cup supplement as fed to her by daughter, states mouth is sore and refuses additional PO intake after dinner intake, see flowsheets for intake details. Colostomy draining appropriately, remains clean/dry/intact. Voided 200cc urine via pure wick post rousseau removal. No further diarrhea as of this time. Repositioned for comfort. Pain medication given per pt request. Call bell at side. Family leaving bedside. Monitor. 1900: Bedside and Verbal shift change report given to JENNY Chandler (oncoming nurse) by Kalen Ashraf RN (offgoing nurse).  Report included the following information SBAR, Kardex, Intake/Output, MAR, Accordion, Recent Results, Cardiac Rhythm NSR. and Alarm Parameters .

## 2020-03-11 NOTE — PROGRESS NOTES
Patient completed ROM as follows. Bilateral Upper Extremity Exercise: 
 
 
EXERCISE Sets Reps Active Active Assist  
Passive Self- assisted ROM Comments Shoulder horizontal abduction  1 10  [] [] [x] [] Shoulder flexion     [] [] [] [] Shoulder extension     [] [] [] [] Elbow flex/ext  1 10  [] [] [x] [] Wrist flexion/extension  1 10  [] [] [x] [] Hand flex/ext  1  10 [] [] [x] []    
     [] [] [] [] Chair pushups   [] [] [] []   
 
 
Pain:  
Pain Level Before FMP: Pt stated that she was not in any pain. Pain Level After FMP: Same 
 
[x]  Alerted nurse. [x]  Call bell and phone within patient reach. [x]   Patient resting in bed with no apparent distress . NOTE:  Pt was extremely drowsy at the beginning of the session, then she woke up towards the end of the session. She did not state that she was in any pain during the session. Nursing was present throughout session. Thank you, 
Ortega Morataya, Rehab Technician

## 2020-03-11 NOTE — PROGRESS NOTES
[address] received from Areli Martinez RN using SBAR MAR I&O and kardex. Whiteboard updated and call bell within reach. Pt is resting with eyes closed. 1300 PT at bedside with pt. 
 
1315 Pt continues to rest with eyes closed. 80 Pt is awake and alert. Requests water. Vanc level drawn. Noontime meds given with no issues. 1645 Pt  To angio for midline placement 1750 Pt back to room from angio. [address] given to Mauri Ford, JENNY using SBAR MAR I&O and kardex.

## 2020-03-11 NOTE — PROGRESS NOTES
NUTRITION Nutrition Consult: General Nutrition Management & Supplements RECOMMENDATIONS / PLAN:  
 
- Decrease lantus to 10 units daily. - Continue oral nutritional supplements: Magic Cup TID and add Ensure Pudding TID. - Monitor adequacy of meal/supplement intake and need to resume supplemental enteral feedings. - Monitor and replace electrolytes as needed, recommend neutraphos. - Continue RD inpatient monitoring and evaluation. NUTRITION INTERVENTIONS & DIAGNOSIS:  
 
- Meals/snacks: modified composition, monitor po intake - Medical food supplement therapy: Magic Cup TID, add Ensure Pudding TID 
- Vitamin and mineral supplement therapy: multivitamin, 60 mEq K, 2 gm Mg, MD to kitty pichardo - Nutrition related medication management: decreased lantus dose - Collaboration and referral of nutrition care: patient discussed with nursing, obtained verbal order from Dr Janna Buckley to decrease lantus to 10 units daily Nutrition Diagnosis: Inadequate oral intake related to decreased appetite, weakness, altered GI function as evidenced by pt consuming 50% or less of recent meals and requires assistance eating. Altered GI function related to altered GI function and structure, bowel regimen and laxative administration as evidenced by loose stool per rectum requiring FMS placement, also with loose output from colostomy. Altered nutrition related laboratory values related to tube feeding stopped with poor meal intake, dose of steroid decreased on long acting insulin as evidenced by hypoglycemia, recent blood glucose of 72 mg/dL. ASSESSMENT:  
 
3/11: Patient with poor appetite and meal intake, c/o mouth pain from sores on tongue causing pain when eating and drinking but improved some today after treatment with lubrication and started on nystatin. No output from FMS but now increased stool from colostomy with continued hypokalemia.  Likes supplements, able to consume 1/3 of magic cup at dinner yesterday per family at bedside. Hypoglycemia with tube feeding stopped and steroids decreased and lantus dose to decrease per discussion with MD.  
 
3/10: Tolerating bolus feeds, hypernatremia improving and replacement given for K of 2.5 today. Requires assistance eating, consuming 30-50% of recent meals and Surgery recommending NGT removal as soon as possible- will remove and discontinue supplemental tube feeding despite inadequate meal intake per discussion with MD. Loose stool per rectum and FMS placed overnight in addition to colostomy output and 3.4 L UOP total yesterday after diuresis- contributing to hypokalemia with low normal magnesium- to be replaced to assist with K repletion. 3/9: Extubated 3/7, NGT keep in place post extubation and tube feeding at goal. Diet started after swallow evaluation this morning, pt lethargic with anticipated poor meal intake- will continue feeds, change to bolus regimen and monitor adequacy of meal intake per MD.  
3/7: Tolerating feeds at 30 mL/hr, residual  mL. Receiving metoclopramide & miralax held this morning after large loose stool filled colostomy bag. 
3/6: Feeds advanced to 20 mL/hr then rate dropped back down to 10 mL/hr after 200 mL residual. Pt with chronic constipation, hx of narcotic use, partial paralysis and prolonged ileus anticipated per MD. Lantus increased for hyperglycemia, hypernatremia improving. 3/5: Tolerating trickle feeds, hypernatremia noted. 3/4: Minimal out from colostomy with 1100 mL out from NGT- will start trial of trickle feeding and monitor. 3/3: Remains intubated, no nausea/vomiting, no ostomy function. No output documented from NGT.  
3/2: S/p ex lap, descending colectomy and creation of colostomy on 3/1 then re-ex lap, drainage of intra-abdominal stool collection, descending colectomy with mobilization of the splenic flexure and transverse colostomy. Remains intubated on the vent postop. Nutritional intake adequate to meet patients estimated nutritional needs:  No  
 
Diet: DIET DYSPHAGIA PUREED (NDD1)  3 Honey/3 Moderately Thick DIET NUTRITIONAL SUPPLEMENTS Breakfast, Lunch, Dinner; [address] Food Allergies: NKFA Current Appetite: Poor Appetite/meal intake prior to admission: Fair, consuming soft foods; good meal intake PTA per family report Feeding Limitations:  [x] Swallowing difficulty: SLP following    [] Chewing difficulty    [x] Other: weakness, mouth pain Current Meal Intake:  
Patient Vitals for the past 100 hrs: 
 % Diet Eaten 03/11/20 0852 20 % 03/10/20 1725 10 % 03/10/20 1300 10 % 03/10/20 0900 50 % BM: 3/11, colostomy (1100 mL output x past 24 hours); FMS- no output today Skin Integrity: abdominal surgical incision with wound VAC Edema:   [] No     [x] Yes Pertinent Medications: Reviewed: SSI, steroid, 16 units lantus, pantoprazole, levothyroxine po, fentanyl Recent Labs  
  03/11/20 
0400 03/10/20 
0609 03/09/20 
0530 * 147* 149*  
K 2.6* 2.5* 2.9*  
 108 113* CO2 32 34* 30  
GLU 72* 98 106* BUN 9 8 10 CREA 0.55* 0.56* 0.59* CA 7.8* 8.1* 8.0*  
MG 2.2 1.6 2.0 PHOS 2.1* 2.7 3.2 ALB 1.5* 1.5* 1.5* SGOT 18 21 27 ALT 17 17 18 Intake/Output Summary (Last 24 hours) at 3/11/2020 1357 Last data filed at 3/11/2020 5361 Gross per 24 hour Intake 2280 ml Output 2320 ml Net -40 ml Anthropometrics: 
Ht Readings from Last 1 Encounters:  
03/03/20 5' 4\" (1.626 m) Last 3 Recorded Weights in this Encounter 03/08/20 2246 03/09/20 0636 03/11/20 1305 Weight: 91.1 kg (200 lb 13.4 oz) 88 kg (194 lb 0.1 oz) 93.2 kg (205 lb 7.5 oz) Body mass index is 35.27 kg/m². Obese, Class I Weight History: previously reported intentional weight loss and previous usual weight of 230 lb; patient reports recent usual weight of 175 lb prior to admission Weight Metrics 3/11/2020 2/28/2020 1/22/2020 1/17/2020 1/2/2020 12/16/2019 12/14/2019 Weight 205 lb 7.5 oz - 171 lb 11.2 oz - 172 lb 6.4 oz - 178 lb 9.6 oz BMI 35.27 kg/m2 28.57 kg/m2 - 28.57 kg/m2 - 28.69 kg/m2 - Some encounter information is confidential and restricted. Go to Review Flowsheets activity to see all data. Admitting Diagnosis: Septic shock ([facility] [address] [A41.9, R65.21] Perforated viscus [R19.8] Pertinent PMHx: COPD, HTN, DM, hepatitis C, sarcoidosis, methadone use, GERD with hiatal hernia, hypothyroidism Procedures: Spine Thoracic Laminectomy T6-T11 with Fusion (12/11/20) 2/2 compression fx of T9-T10 and spinal cord compression at T9 and T10 and spinal cord edema with subsequent paraparesis Education Needs:        [x] None identified  [] Identified - Not appropriate at this time  []  Identified and addressed - refer to education log Learning Limitations:   [x] None identified  [] Identified:  
Cultural, [employer] & ethnic food preferences:  [x] None identified    [] Identified and addressed ESTIMATED NUTRITION NEEDS:  
 
Calories: [address] kcal (MSJx1.2-1.5) based on  [x] Actual BW 88 kg     [] IBW Protein:  gm (1-1.5 gm/kg) based on  [x] Actual BW      [] IBW Fluid: 1 mL/kcal 
  
MONITORING & EVALUATION:  
  
Nutrition Goal(s):  
- PO nutrition intake will meet >75% of patient estimated nutritional needs within the next 7 days. Outcome: Progressing towards goal   
- Improvement in transit time, patient will have 200-600 mL/d colostomy output within the next 7 days. Outcome: Progressing towards goal   
- Patient blood glucose levels will maintain WNL of  mg/dL within the next 5-7 days.   Outcome: New/Initial goal      
 
Monitoring:   [x] Food and nutrient intake   [x] Food and nutrient administration  [x] Comparative standards   [x] Nutrition-focused physical findings   [x] Anthropometric Measurements   [x] Treatment/therapy   [x] Biochemical data, medical tests, and procedures Previous Recommendations (for follow-up assessments only): Implemented Discharge Planning: Nutritional discharge needs unknown at this time. Participated in care planning, discharge planning, & interdisciplinary rounds as appropriate. Minerva Moffett RD, [facility] Pager: [phone]

## 2020-03-11 NOTE — PROGRESS NOTES
[facility]. Pulmonary, Critical Care, and Sleep Medicine Name: Rosanna Jackson MRN: [id number] : 1955 Hospital: [address] [place] [address] Dr Date: 3/11/2020  Admission Date: 2020 Chart and notes reviewed. Data reviewed. I have evaluated all findings. [x]I have reviewed the flowsheet and previous days notes. Interval HPI:Patient is a 62 y. o. female with PMH of COPD (on 3LPM NC home O2), Sarcoidosis, HTN, T2DM,  Hep C, recent Spine Thoracic Laminectomy T6-T11 with Fusion (20) 2/2 compression fx of T9-T10 and spinal cord compression at T9 and T10 and spinal cord edema with subsequent paraparesis, who presented to [facility] ED on 20 c/o acute abdominal pain, SOB with associated constipation x4-5 days PTA, likely 2/2 aforementioned spinal surgery on 19. CT Chest/Abd/Pelvis showed intraperitoneal free air consistent with perforated viscus. Required intubation, Underwent emergent ex lap , was found to have sigmoid colon perforation, 2/2 constipation w/ large stool burden spilling into the peritoneum per Dr. Ebonie Sosa. Dr. Ebonie Sosa took pt back to OR on 3/1 for abdominal washout and closure. ICU stay complicated by persistent vasopressor requirement and afib. Subjective 
 
 20 Overnight events: tolerating salter NC 4L well. Mentation/Activity: AxO x3, following commands Respiratory- dry cough Hemodynamics: H/H stable, off pressors Diet: Tube feeds and p.o. Potassium 2.6 today ROS:Review of systems not obtained due to patient factors. Events and notes from last 24 hours reviewed. Patient Active Problem List  
Diagnosis Code  Diabetic neuropathy associated with type 2 diabetes mellitus (HCC) E11.40  Venous insufficiency I87.2  Obesity, Class I, BMI 30-34.9 E66.9  Chronic back pain M54.9, G89.29  
 Generalized osteoarthritis of multiple sites M15.9  Bipolar affective disorder ([facility] F31.9  Abnormally low high density lipoprotein (HDL) cholesterol with hypertriglyceridemia E78.6, E78.1  Diastolic dysfunction without heart failure I51.89  Chronic obstructive pulmonary disease (COPD) ([facility] J44.9  Hypertensive heart disease without heart failure I11.9  Depression F32.9  History of back injury Z87.828  Type 2 diabetes mellitus with diabetic neuropathy, with long-term current use of insulin ([facility]) E11.40, Z79.4  Anxiety F41.9  Wears glasses Z97.3  History of hepatitis C Z86.19  
 Sickle cell trait ([facility]) D57.3  History of acute renal failure Z87.448  Hypothyroidism E03.9  Recurrent genital herpes A60.00  Sarcoidosis D86.9  Abscess of right arm L02.413  Hypoxemia requiring supplemental oxygen R09.02, Z99.81  
 Abnormal nuclear stress test R94.39  
 Cellulitis and abscess of hand L03.119, L02.519  
 Cellulitis and abscess of foot L03.119, L02.619  
 Cellulitis of arm, right L03. 113  
 Olecranon bursitis of right elbow M70.21  
 Contusion of left elbow S50. 02XA  Intravenous drug user F19.90  Right Achilles tendinitis M76.61  
 History of penicillin allergy Z88.0  Falls frequently R29.6  Gastroesophageal reflux disease with hiatal hernia K21.9, K44.9  Memory difficulty R41.3  Mixed connective tissue disease ([facility] M35.1  Impaired mobility and ADLs Z74.09  
 Hyperuricemia E79.0  History of vitamin D deficiency Z86.39  
 Urge urinary incontinence N39.41  Spinal cord compression ([facility]) G95.20  Compression fracture of body of thoracic vertebra ([facility]) S22.000A  Status post laminectomy with spinal fusion Z98.1  Acute blood loss as cause of postoperative anemia D62  
 History of fracture due to fall Z87.81  Chronic respiratory failure with hypoxia ([facility]) J96.11  
 Cellulitis of right forearm L03. 113  
 Gout M10.9  Menopause Z78.0  Long term current use of aspirin Z79.82  
 Opioid dependence ([facility]) F11.20  Tobacco use disorder F17.200  Sepsis ([address] A41.9  Perforated viscus R19.8  Septic shock (HCC) A41.9, R65.21 Vital Signs: 
Visit Vitals /82 Pulse 94 Temp 97.8 °F (36.6 °C) Resp 25 Ht 5' 4\" (1.626 m) Wt 88 kg (194 lb 0.1 oz) LMP 2012 (Exact Date) SpO2 100% BMI 33.30 kg/m² O2 Device: Nasal cannula O2 Flow Rate (L/min): 3 l/min Temp (24hrs), Av.3 °F (36.8 °C), Min:97.5 °F (36.4 °C), Max:99.8 °F (37.7 °C) Intake/Output:  
Last shift:      No intake/output data recorded. Last 3 shifts:  1901 -  0700 In: 4120 [P.O.:790; I.V.:1530] Out: 8537 [Urine:2650; Drains:650] Current Facility-Administered Medications Medication Dose Route Frequency  [START ON 3799] multivit-folic acid-herbal 385 (WELLESSE PLUS) oral liquid 30 mL  30 mL Oral DAILY  predniSONE (DELTASONE) tablet 30 mg  30 mg Oral DAILY WITH BREAKFAST  potassium chloride 10 mEq in 50 ml IVPB  10 mEq IntraVENous Q1H  
 levothyroxine (SYNTHROID) tablet 100 mcg  100 mcg Oral 6am  
 vancomycin trough due  at 1130 (draw before dose)  1 Each Other ONCE  
 apixaban (ELIQUIS) tablet 5 mg  5 mg Oral BID  
 amiodarone (CORDARONE) tablet 200 mg  200 mg Oral BID  sodium chloride 3% hypertonic nebulizer soln  4 mL Nebulization BID RT  
 vancomycin (VANCOCIN) 1,000 mg in 0.9% sodium chloride (MBP/ADV) 250 mL adv  1,000 mg IntraVENous Q12H  
 insulin glargine (LANTUS) injection 16 Units  16 Units SubCUTAneous DAILY  piperacillin-tazobactam (ZOSYN) 4.5 g in 0.9% sodium chloride (MBP/ADV) 100 mL MBP  4.5 g IntraVENous Q6H  
 pantoprazole (PROTONIX) 40 mg in 0.9% sodium chloride 10 mL injection  40 mg IntraVENous Q12H  
 albuterol-ipratropium (DUO-NEB) 2.5 MG-0.5 MG/3 ML  3 mL Nebulization Q4H RT  
 budesonide (PULMICORT) 500 mcg/2 ml nebulizer suspension  500 mcg Nebulization BID RT  
 insulin lispro (HUMALOG) injection   SubCUTAneous Q6H  
 
 
 
 Telemetry: [x]Sinus []A-flutter []Paced []A-fib []Multiple PVCs Physical Exam:  
  
General: Awake and alert, follows commands HEENT:  Anicteric sclerae; pink palpebral conjunctivae; mucosa moist 
Resp:  Symmetrical chest expansion, no accessory muscle use; good airway entry; no rales/ wheezing/ rhonchi noted CV:  S1, S2 present; regular rate and rhythm GI:  Abdomen soft, non-tender; (+) active bowel sounds Extremities:  +2 pulses on all extremities; no edema/ cyanosis/ clubbing noted Skin:  Warm; no rashes/ lesions noted, normal turgor/cap refill Neurologic:  Non-focal, follows commands Devices:  NGT, R IJ CVL, Renae DATA: 
MAR reviewed and pertinent medications noted or modified as needed Labs: 
Recent Labs  
  03/11/20 
0400 03/10/20 
0609 03/09/20 
0530 WBC 7.9 6.4 9.2 HGB 8.5* 8.3* 8.6* HCT 27.1* 26.7* 27.8*  
 252 167 Recent Labs  
  03/11/20 
0400 03/10/20 
0609 03/09/20 
0530 * 147* 149*  
K 2.6* 2.5* 2.9*  
 108 113* CO2 32 34* 30  
GLU 72* 98 106* BUN 9 8 10 CREA 0.55* 0.56* 0.59* CA 7.8* 8.1* 8.0*  
MG 2.2 1.6 2.0 PHOS 2.1* 2.7 3.2 ALB 1.5* 1.5* 1.5* SGOT 18 21 27 ALT 17 17 18 No results for input(s): PH, PCO2, PO2, HCO3, FIO2 in the last 72 hours. No results for input(s): FIO2I, IFO2, HCO3I, IHCO3, HCOPOC, PCO2I, PCOPOC, IPHI, PHI, PHPOC, PO2I, PO2POC in the last 72 hours. No lab exists for component: IPOC2 Imaging: 
[x]   I have personally reviewed the patients radiographs and reports XR Results (most recent): 
Results from [facility] Encounter encounter on 02/29/20 XR CHEST PORT Narrative EXAM: CHEST  CPT CODE: 17996 CLINICAL INDICATION/HISTORY: Intubated. COMPARISON: 7 March 2020. TECHNIQUE: Single AP portable view of chest at 0416. FINDINGS: There is underpenetrated technique in the apices are partially 
obscured by the patient's head. There is a nasogastric tube with the tip below the diaphragm and off the bottom of the image at the previously noted 
endotracheal tube is not definitely seen. There is a right internal jugular 
central venous catheter with tip in the distal superior vena cava. Hazy 
increasing opacity is seen in both lung bases with obscuration hemidiaphragms as 
well as increased hilar interstitial prominence, most consistent with 
interstitial infiltrate/edema and basilar effusions and atelectasis. The 
cardiomediastinal silhouette is normal.  The bones and soft tissues are 
unremarkable except for posterior fusion hardware at the mid thoracic spine, 
unchanged. Impression IMPRESSION: An endotracheal tube is not definitely seen but is somewhat underpenetrated 
study. Persistent bilateral basilar infiltrates edema and probable 
effusion/atelectasis. CT Results (most recent): 
Results from [facility] [place] Encounter encounter on 02/29/20 CT CHEST ABD PELV W CONT Narrative CT SCAN OF THE CHEST, ABDOMEN AND PELVIS, WITH CONTRAST INDICATION: Above. Arrived with shortness of breath and abdominal pain. Septic 
shock. Hypoxia. History of sarcoidosis/COPD. Altered. Abdominal distention and 
tympani. Intraperitoneal free air on chest radiograph. Perforated viscus. COMPARISON: Chest CT 1/21/2010. No prior abdominopelvic CT in PACS. Report is 
noted in the electronic medical record (care everywhere) of previous noncontrast 
enhanced abdominopelvic CT performed elsewhere 7/21/2014. TECHNIQUE: With IV administration of 70 mL Isovue-300, without administration of 
oral contrast, helically acquired serial axial CT images through the chest, 
abdomen and pelvis were obtained. Additional coronal and sagittal reformation 
images were also performed.  
 
All CT scans at this facility are performed using dose optimization technique as 
appropriate to the performed exam, to include automated exposure control, 
 adjustment of the mA and/or kV according to patient's size (Including 
appropriate matching for site-specific examinations), or use of iterative 
reconstruction technique. FINDINGS: 
 
CT chest:  
 
Endotracheal tube tip is in place cephalad to the level of the alyssa. Esophagogastric tube is noted, tip in the mid stomach. Severe pulmonary emphysematous disease again seen with extensive bullous changes 
most conspicuous in the upper lungs. Scattered scarring. There has been interval 
development of consolidative appearing lung opacities in the lower lobes 
bilaterally consistent with airspace disease likely in addition to atelectasis. The small stable subpleural pulmonary nodule described in the right lower lobe 
on the recent chest CT is again seen, slightly better visualized on the current 
study measuring approximately 4 mm, unchanged compared to the CT of 2/6/2018 
(axial 34). Multifocal chronic nodular pleuro-parenchymal scarring in the left 
upper lobe without significant interval change compared to the preceding chest 
CT or study of 2/6/2018. No definite suspicious new pulmonary nodule/mass identified compared to the 
preceding CT. No evidence of pathologic lymph node enlargement is seen. No evidence of pleural or significant pericardial effusion. CT abdomen/pelvis:  
 
Small ascites, best seen around the liver and in the anterior pelvis adjacent to 
the distal descending and sigmoid colon. The spleen is heterogeneous in attenuation and demonstrates several rounded 
hypodensities as detailed on the recent chest CT of 1/21/2020. Indeterminate attenuation masses in the kidneys bilaterally, 2.7 cm right kidney 
upper to midpole laterally, 2.1 cm left kidney interpolar region posteriorly. Further evaluation recommended, beginning with ultrasound might be helpful when 
clinically feasible if the corresponding lesions can be identified sonographically. Additional smaller subcentimeter renal hypodensities 
bilaterally, possibly cysts, too small to be specifically characterized. The liver, gallbladder, pancreas, and adrenal glands appear unremarkable. Scattered calcifications in the abdominal aorta and its major branches. The 
abdominal aorta enhances normally without abdominal aortic aneurysm. A few scattered small subcentimeter short axis lymph nodes bilaterally in the 
pelvis or retroperitoneum. No evidence of pathologic lymph node enlargement is 
seen. Moderate quantity of intraperitoneal free air in the nondependent anterior 
abdomen, consistent with perforated viscus. Additional scattered foci of free 
air elsewhere in the peritoneum and mesentery, including in the right upper 
quadrant near the gallbladder/duodenum, and scattered bilaterally in the 
mesenteric/peritoneal fat of the upper and lower abdomen. Grouped small foci of 
intraperitoneal free air are noted close to the colonic wall along the 
left/anterior side of the sigmoid colon (reference axial 112-119). The left 
hemicolon is diffusely mildly distended to the rectum containing gas, stool, and 
hyperdense material. Clinical correlation might be helpful regarding recent 
ingestion of hyperdense material. The rectum measures approximately 8.5 cm in 
caliber. The sigmoid measures approximately 7.3 cm in caliber on axial image 115. The site of the or free viscus is uncertain. Common sites could include 
duodenal or gastric perforation such as may be seen due to perforated ulcer. However, there is small focal hypoattenuation along the wall of the sigmoid 
colon on series 2 axial images 111-113 which could potentially reflect a small 
mural defect such as could be seen related to constipation, stercoral colitis, 
diverticular disease. The appendix is seen within the ascites in the right lower quadrant, assessment for stranding in the periappendiceal fat limited by the ascites, without gross 
abnormal thickening of the appendix seen. No gas within the appendix. The right 
hemicolon appears grossly unremarkable. There is mild diffuse mural thickening of small bowel loops and mild prominence 
of enhancement, nonspecific. No definite associated pneumatosis. No portal 
venous gas is seen. The SMA/SMV appear to maintain usual orientation. Broadly 
speaking differential considerations could include inflammatory, infectious, 
ischemic. Clinical correlation is recommended including with lactate 
measurement. Conceivably sequela of peritonitis in the setting of bowel 
perforation? Uterus is present. Small gas is noted within the uterus and vagina, nonspecific, 
can be seen as a physiologic finding. On review of bone windows, there is a superior endplate compression fracture 
with mildly to moderately decreased vertebral body height at L2. Bilateral 
spinal fusion rods spanning from L2-V9-U52-T12. Severe compression fracture 
deformity again seen at T9. Compression fracture with rather pronounced 
decreased vertebral body height again seen at T5. Mild endplate indentations at 
several other levels including T4, T6, T10. The posterior midline fluid 
collection described on the preceding study was better visualized on the 
preceding study, extensive metallic hardware artifacts noted. Impression Impression: Moderate quantity of intraperitoneal free air. Findings are consistent with 
perforated viscus. The site of perforation is uncertain, as discussed above, 
possibly the sigmoid colon where there is apparent small subtle focal 
hypoattenuation along the wall as detailed above (reference axial images 111-113) close to region where several foci of free air are grouped. The left 
hemicolon is mildly distended and filled with hyperdense appearing stool. Mild diffuse mural thickening and enhancement of the small bowel as described. Small volume of ascites best seen around the liver and in the pelvis adjacent to 
the sigmoid. Bibasilar consolidations, right greater than left, suspicious for airspace 
disease/pneumonia. Indeterminate attenuation bilateral renal masses, follow-up evaluation 
recommended. Splenic hypodensities again seen as described on recent chest CT. Intubated. Severe pulmonary emphysematous disease and fibrotic changes. Multiple otherwise nonacute findings as detailed above. I have discussed these results directly with Dr. Sybil Crump in the ED at 12:20 p.m. 
in addition to the patient's consulted surgeon at 12:25 PM. IMPRESSION:  
· Acute on Chronic Respiratory Failure - Emergently intubated in ED for airway protection-liberated from ventilator and extubated 3/8/2020 . chronically on 3L NC at home. · Acute Sigmoid Colon Perforation w/ peritonitis - s/p emergent ex-lap w/ drainage of intraabdominal stool and collections; sigmoid colectomy with Dr. Flores Choi on 02/29/20. Subsequent washout and abdominal closure on 03/01/20.  · Staph bacteremia - Grew on BCx collected 3/3. GNR bacteremia on 2/29, no regrowth · Hypokalemia - Likely 2/2 diuresis. · Hypophosphatemia- ?refeeding syndrome- resolved · Metabolic alkalosis ?diuresis / GI loss · Intermittent Tachyarrhythmia - afibb/flutter - improved · Septic Shock due to peritonitis, UTI · Bilateral PNA - Sputum Cx collected 3/1 grew Moraxella catarrhalis. · Troponemia - demand ischemia, not an MI.  
· Hypothyroidism · ELIF - Likely pre-renal, 2/2 above. Initial ELIF resolved. Pt w/ mild increase in BUN/Cr w/ diuresis.  
· UTI - UCx grew E.coli.  
· T2DM  
· Paraparesis, in wheelchair: Recent Hx of Spine Thoracic Laminectomy T6-T11 with Fusion (12/11/20) with Dr. Eduardo Dee 2/2 Acute compression fx of T9-T10 and spinal cord compression at T9 and T10 and spinal cord edema with subsequent paraparesis. · Hx of Sarcoidosis - Chronic steroid use. · Hx of COPD, on chronic 3L NC, FEV1 51% in 2012, unable to perform repeat studies since  - Follows with Dr. Claudean Fire in clinic. · Hx of Cocaine and Heroin Abuse - on methadone. Patient Active Problem List  
Diagnosis Code  Diabetic neuropathy associated with type 2 diabetes mellitus ([facility]) E11.40  Venous insufficiency I87.2  Obesity, Class I, BMI 30-34.9 E66.9  Chronic back pain M54.9, G89.29  
 Generalized osteoarthritis of multiple sites M15.9  Bipolar affective disorder ([facility] F31.9  Abnormally low high density lipoprotein (HDL) cholesterol with hypertriglyceridemia E78.6, E78.1  Diastolic dysfunction without heart failure I51.89  Chronic obstructive pulmonary disease (COPD) ([facility] J44.9  Hypertensive heart disease without heart failure I11.9  Depression F32.9  History of back injury Z87.828  Type 2 diabetes mellitus with diabetic neuropathy, with long-term current use of insulin ([facility]) E11.40, Z79.4  Anxiety F41.9  Wears glasses Z97.3  History of hepatitis C Z86.19  
 Sickle cell trait ([facility]) D57.3  History of acute renal failure Z87.448  Hypothyroidism E03.9  Recurrent genital herpes A60.00  Sarcoidosis D86.9  Abscess of right arm L02.413  Hypoxemia requiring supplemental oxygen R09.02, Z99.81  
 Abnormal nuclear stress test R94.39  
 Cellulitis and abscess of hand L03.119, L02.519  
 Cellulitis and abscess of foot L03.119, L02.619  
 Cellulitis of arm, right L03. 113  
 Olecranon bursitis of right elbow M70.21  
 Contusion of left elbow S50. 02XA  Intravenous drug user F19.90  Right Achilles tendinitis M76.61  
 History of penicillin allergy Z88.0  Falls frequently R29.6  Gastroesophageal reflux disease with hiatal hernia K21.9, K44.9  Memory difficulty R41.3  Mixed connective tissue disease ([facility] M35.1  Impaired mobility and ADLs Z74.09  
  Hyperuricemia E79.0  History of vitamin D deficiency Z86.39  
 Urge urinary incontinence N39.41  Spinal cord compression (HCC) G95.20  Compression fracture of body of thoracic vertebra ([facility]) S22.000A  Status post laminectomy with spinal fusion Z98.1  Acute blood loss as cause of postoperative anemia D62  
 History of fracture due to fall Z87.81  Chronic respiratory failure with hypoxia ([facility]) J96.11  
 Cellulitis of right forearm L03. 113  
 Gout M10.9  Menopause Z78.0  Long term current use of aspirin Z79.82  
 Opioid dependence ([facility]) F11.20  Tobacco use disorder F17.200  Sepsis ([facility] [address] A41.9  Perforated viscus R19.8  Septic shock ([facility]) A41.9, R65.21  
 
  
RECOMMENDATIONS:  
 
Pulm: Aspiration precautions, HOB>30'. On NC 4L. Titrate goal SPO2 >90%, on home O2 at 3 L. Continue duo nebs, We will start maintenance prednisone dose 30 mg daily ID-continue Vanco and Zosyn for peritonitis Surgical-continue postop wound care, ostomy care, wound VAC Nutrition-advance as tolerated. Dietary service following Electrolytes-monitor and replace Will need OT PT, speech therapy care management for next level of care Stress ulcer and DVT prophylaxis High complexity decision making was performed during this consultation and evaluation. [x]       Pt is at high risk for further organ failure and dysfunction. Michelle Philip MD 
03/11/20 Pulmonary, Critical Care Medicine [facility]

## 2020-03-11 NOTE — PROGRESS NOTES
Cardiology progress note CARDIOLOGY PROGRESS NOTE 
RECS: 
 
 
 
1. Paroxymal Atrial flutter/atrial fibrillation  with RVR- maintaining NSR- continue  amiodarone po. On Eliquis 5 mg bid with stable H&H. Will monitor for bleeding. 2. Acute respiratory Failure- intubated. Extubated 3/7/20 - on O2 by Nasal canula 3. Acute Sigmoid Colon Perforation - s/p Emergent Ex-Lap with drainage of intraabdominal stool and collections; sigmoid colectomy with Dr. Yesi Guido on 02/29/20.sigmoid perforation, secondary peritonitis  S/p Re-exploratory laparotomy, drainage of intra-abdominal stool collection, descending colectomy with mobilization of the splenic flexure, and transverse colostomy on 3/1. 
4. Septic Shock-improved  on antibiotics managed by [facility] and ID  
5. Hypotension- in the setting of #3 resolved 6. Acute on chronic diastolic heart failure-on lasix prn per [facility] 7. Hx sarcoidosis  
8. Hx of abnormal Dobutamine  stress test- 11/2016  
9. Hx of Tobacco abuse 10. Pulmonary hypertension with PAP 80 mmHg on recent echo  
11. Abnormal TSH- medications per [facility] care 12. Hypokalemia- persistent replete by [facility]. 
  
Remaining in sinus rhythm on amiodarone. Blood pressure is remaining stable. As patient improves and if BP remains higher, will start diuretics gradually. And once she improves completely, work-up for pulmonary hypertension will be considered. 
  
       
02/29/20 ECHO ADULT FOLLOW-UP OR LIMITED 03/03/2020 3/3/2020  
  Narrative · Dilated right ventricle. Reduced systolic function. Abnormal right  
ventricular septal motion. Systolic flattening of interventricular septum  
consistent with right ventricle pressure overload. · Moderate tricuspid valve regurgitation is present. · Severely elevated central venous pressure (15+ mmHg); IVC diameter is  
larger than 21 mm and collapses less than 50% with respiration. · Severe pulmonary hypertension. · Pulmonary arterial systolic pressure is 51.5 mmHg · Normal cavity size, wall thickness and systolic function (ejection  
fraction normal). Estimated left ventricular ejection fraction is 55 -  
60%. · Dilated right atrium. 
   
    Signed by: Jody Davis MD  
 
 
 
ASSESSMENT: 
Hospital Problems  Date Reviewed: 2/28/2020 Codes Class Noted POA Perforated viscus ICD-10-CM: R19.8 ICD-9-CM: 799.89  2/29/2020 Unknown Septic shock (HCC) ICD-10-CM: A41.9, R65.21 ICD-9-CM: 038.9, 785.52, 995.92  2/29/2020 Unknown SUBJECTIVE: 
Patient alert. No c/o dyspnea. Weak. No distress noted OBJECTIVE: 
 
VS:  
Visit Vitals /82 Pulse 94 Temp 97.8 °F (36.6 °C) Resp 25 Ht 5' 4\" (1.626 m) Wt 88 kg (194 lb 0.1 oz) SpO2 100% BMI 33.30 kg/m² Intake/Output Summary (Last 24 hours) at 3/11/2020 1121 Last data filed at 3/11/2020 0801 Gross per 24 hour Intake 2950 ml Output 2520 ml Net 430 ml  
 
TELE: normal sinus rhythm General: No acute distress HENT: Normocephalic, atraumatic. Neck :  Supple Cardiac:  Normal S1/S2 Chest/Lungs: harsh breath sound left lower lung. RL lobe diminished Abdomen: Soft, distended non tender Extremities: no edema noted Labs: Results:  
   
Chemistry Recent Labs  
  03/11/20 
0400 03/10/20 
0609 03/09/20 
0530 GLU 72* 98 106* * 147* 149*  
K 2.6* 2.5* 2.9*  
 108 113* CO2 32 34* 30  
BUN 9 8 10 CREA 0.55* 0.56* 0.59* CA 7.8* 8.1* 8.0*  
MG 2.2 1.6 2.0 PHOS 2.1* 2.7 3.2 AGAP 5 5 6 BUCR 16 14 17 AP 50 51 54  
TP 5.9* 5.5* 5.4* ALB 1.5* 1.5* 1.5*  
GLOB 4.4* 4.0 3.9 AGRAT 0.3* 0.4* 0.4* CBC w/Diff Recent Labs  
  03/11/20 
0400 03/10/20 
0609 03/09/20 
0530 WBC 7.9 6.4 9.2  
RBC 3.19* 3.12* 3.23* HGB 8.5* 8.3* 8.6* HCT 27.1* 26.7* 27.8*  
 252 167 GRANS 51 54 79* LYMPH 23 27 9* EOS 3 0 0 Cardiac Enzymes No results for input(s): CPK, CKND1, SANTOS in the last 72 hours. 
 
No lab exists for component: [place] Coagulation No results for input(s): PTP, INR, APTT, INREXT, INREXT in the last 72 hours. Lipid Panel Lab Results Component Value Date/Time Cholesterol, total 141 09/30/2019 12:00 AM  
 HDL Cholesterol 39 (L) 09/30/2019 12:00 AM  
 LDL, calculated 61 09/30/2019 12:00 AM  
 VLDL, calculated 41 (H) 09/30/2019 12:00 AM  
 Triglyceride 204 (H) 09/30/2019 12:00 AM  
 CHOL/HDL Ratio 3.5 11/06/2016 04:18 AM  
  
BNP No results for input(s): BNPP in the last 72 hours. Liver Enzymes Recent Labs  
  03/11/20 
0400 TP 5.9* ALB 1.5* AP 50 SGOT 18 Digoxin Thyroid Studies Lab Results Component Value Date/Time TSH 5.11 (H) 03/05/2020 06:25 PM  
    
 
 
 
Suma E Halecki, NP -C I have independently evaluated and examined the patient. All relevant labs and testing data are reviewed. Care plan discussed and updated after review.  
Mona Bowie MD

## 2020-03-11 NOTE — PROGRESS NOTES
LTSS screening: This writer completed patient's LTSS screening for long term care. The LTSS was submitted for processing, in the Medicaid portal. She will likely need an LTACH placement. This writer will continue to monitor for discharge planning and recommendations. Agnes Portillo. MSW Care Manager Pager#: [phone]

## 2020-03-11 NOTE — PROGRESS NOTES
[facility] Hospitalist Group Progress Note Patient: Jamison Lawson Age: 62 y.o. : 1955 MR#: [id number] SSN: [id number] Date/Time: 3/11/2020 Subjective:  
 
Review of systems Complains of burning sensation in mouth with every food she eats Oral cavity is very dirty with dirty secretions very difficult to determine whether there is any white patches because there are patches which are likely mucocutaneous fungal infection oral pharyngeal 
 
No CP  
NO NVD No SOB  NO Cough No distress Assessment/Plan:  
Patient with sigmoid colonic perforation, secondary to constipation with large amount of stool burden spilling into peritoneal, peritonitis, also present Prevotella bloodstream infection now status post ex lap with resection Also E. coli cystitis, acute on chronic respiratory failure. 1.  Sepsis-shock 2 acute hypoxic respiratory failure secondary to sepsis 3 cystitis E. coli present on admission 4 status post exploratory laparotomy-recovering well surgery signed off 
5 bacteremia-followed by ID  
6 anemia due to chronic illness 7 hypokalemia-on replacement 8 hypoalbuminemia-patient was on TPN with supplements now able to eat p.o. discussed with nutrition 9 Bilateral PNA - Sputum Cx collected 3/1 grew Moraxella catarrhalis 10 Oropharyngeal candida - on Nystatin Plan: 
-Persistent hypokalemia-on replacement 
- Continue Po diet , poor po intake from pain in mouth - hope fully with treatment of candida it will  improve  
 
-Complex patient with complex infective etiology, added problem will be penicillin allergic, patient is seen by ID will continue to monitor with ID 
 
-Follow electrolytes and correct 
 
-Follow with subspecialty Case discussed with:  [x]Patient  [] Family ( In room with patient )    []Nursing  []Case Management DVT Prophylaxis:  []Lovenox  []Hep SQ  []SCDs  []Coumadin   []On Heparin gtt Objective:  
VS:  
Visit Vitals /82 Pulse 94 Temp 97.8 °F (36.6 °C) Resp 25 Ht 5' 4\" (1.626 m) Wt 93.2 kg (205 lb 7.5 oz) LMP 2012 (Exact Date) SpO2 100% BMI 35.27 kg/m² Tmax/24hrs: Temp (24hrs), Av.7 °F (36.5 °C), Min:97.5 °F (36.4 °C), Max:98 °F (36.7 °C) IOBRIEF Intake/Output Summary (Last 24 hours) at 3/11/2020 1359 Last data filed at 3/11/2020 0093 Gross per 24 hour Intake 2280 ml Output 2320 ml Net -40 ml General: No distress noted Oropharyngeal mucosa wet and dirty -likely fungal Candida infection Cardiovascular: S1S2 - regular , No Murmur Pulmonary: Equal expansion , No Use of accessory muscles , No Rales No Rhonchi GI:  +BS in all four quadrants, soft, non-tender Extremities:  No edema; 2+ dorsalis pedis pulses bilaterally Neuro: No focal weakness Medications:  
Current Facility-Administered Medications Medication Dose Route Frequency  [START ON ] multivit-folic acid-herbal 414 (WELLESSE PLUS) oral liquid 30 mL  30 mL Oral DAILY  predniSONE (DELTASONE) tablet 30 mg  30 mg Oral DAILY WITH BREAKFAST  potassium chloride 10 mEq in 50 ml IVPB  10 mEq IntraVENous Q1H  
 insulin lispro (HUMALOG) injection   SubCUTAneous AC&HS  pantoprazole (PROTONIX) tablet 40 mg  40 mg Oral ACB&D  
 nystatin (MYCOSTATIN) 100,000 unit/mL oral suspension 500,000 Units  500,000 Units Oral QID  levothyroxine (SYNTHROID) tablet 100 mcg  100 mcg Oral 6am  
 vancomycin trough due  at 1130 (draw before dose)  1 Each Other ONCE  
 apixaban (ELIQUIS) tablet 5 mg  5 mg Oral BID  
 amiodarone (CORDARONE) tablet 200 mg  200 mg Oral BID  sodium chloride 3% hypertonic nebulizer soln  4 mL Nebulization BID RT  
 vancomycin (VANCOCIN) 1,000 mg in 0.9% sodium chloride (MBP/ADV) 250 mL adv  1,000 mg IntraVENous Q12H  
 fentaNYL citrate (PF) injection  mcg   mcg IntraVENous Q2H PRN  
 insulin glargine (LANTUS) injection 16 Units  16 Units SubCUTAneous DAILY  piperacillin-tazobactam (ZOSYN) 4.5 g in 0.9% sodium chloride (MBP/ADV) 100 mL MBP  4.5 g IntraVENous Q6H  
 acetaminophen (TYLENOL) solution 650 mg  650 mg Per NG tube Q4H PRN  
 diphenhydrAMINE (BENADRYL) injection 25 mg  25 mg IntraVENous Q4H PRN  
 sodium chloride (NS) flush 5-10 mL  5-10 mL IntraVENous PRN  
 sodium chloride (NS) flush 5-40 mL  5-40 mL IntraVENous PRN  
 albuterol-ipratropium (DUO-NEB) 2.5 MG-0.5 MG/3 ML  3 mL Nebulization Q4H RT  
 albuterol (ACCUNEB) nebulizer solution 1.25 mg  1.25 mg Nebulization Q6H PRN  
 budesonide (PULMICORT) 500 mcg/2 ml nebulizer suspension  500 mcg Nebulization BID RT  
 glucose chewable tablet 16 g  4 Tab Oral PRN  
 glucagon (GLUCAGEN) injection 1 mg  1 mg IntraMUSCular PRN  
 dextrose (D50W) injection syrg 12.5-25 g  25-50 mL IntraVENous PRN Labs:   
Recent Labs  
  03/11/20 
0400 03/10/20 
0609 03/09/20 
0530 WBC 7.9 6.4 9.2 HGB 8.5* 8.3* 8.6* HCT 27.1* 26.7* 27.8*  
 252 167 Recent Labs  
  03/11/20 
0400 03/10/20 
0609 03/09/20 
0530 * 147* 149*  
K 2.6* 2.5* 2.9*  
 108 113* CO2 32 34* 30  
GLU 72* 98 106* BUN 9 8 10 CREA 0.55* 0.56* 0.59* CA 7.8* 8.1* 8.0*  
MG 2.2 1.6 2.0 PHOS 2.1* 2.7 3.2 ALB 1.5* 1.5* 1.5* SGOT 18 21 27 ALT 17 17 18 Signed By: Coby Torres MD   
 March 11, 2020

## 2020-03-11 NOTE — PROGRESS NOTES
Bedside and Verbal shift change report given to [address] [place] [address],Second Floor (oncoming nurse) by Yumiko Trammell (offgoing nurse). Report included the following information SBAR, Kardex and MAR.

## 2020-03-11 NOTE — PROGRESS NOTES
SLP Note: 
 
9252:  Second attempt at dysphagia tx; however, pt sleeping. Requesting hold at this time. Will follow up next date. 946:Attempted follow up dysphagia tx; however, pt involved with direct nursing care. Will continue to follow per POC. Navneet Mukherjee M.S., CCC-SLP Speech-Language Pathologist

## 2020-03-11 NOTE — DIABETES MGMT
GLYCEMIC CONTROL PLAN OF CARE Assessment/Recommendations: 
Lab glucose this am 72 mg/dl Noted steroids decreased Recommend decreasing lantus dose to 14 units daily Continue corrective insulin coverage as needed. Will continue inpatient monitoring. Most recent blood glucose values: 
Results for Tsering Ortega (MRN [id number]) as of 3/11/2020 09:29 Ref. Range 3/10/2020 06:23 3/10/2020 11:35 3/10/2020 17:23 3/10/2020 22:27 3/11/2020 06:09  
GLUCOSE,FAST - POC Latest Ref Range: 70 - 110 mg/dL 124 (H) 237 (H) 245 (H) 156 (H) 76 Current A1C of 8.2 % is equivalent to average blood glucose of 189_mg/dl over the past 2-3 months. Current hospital diabetes medications:  
Lantus 16 units daily Lispro corrective insulin coverage every 6 hours as needed Previous day's insulin requirements:  
Lantus 16 units Lispro 12 units corrective insulin Home diabetes medications:  Per pta med list 
Lantus 20 units daily before breakfast 
Humalog AC based on her blood sugar reading Diet:   
NPO. TF. 
Education:  ____Refer to Diabetes Education Record  
          _x__Education not indicated at this time Jose Castillo RN CDE Ext [phone]

## 2020-03-11 NOTE — PROGRESS NOTES
Infectious Disease progress Note Reason: sepsis, gram negative bacteremia, staph epidermidis bacteremia Current abx Prior abx Pip/tazo since 3/1 Vancomycin since 3/6/20 Cefepime, metronidazole 2/29-3/1 Vancomycin 2/29-3/2/20 Lines:  
 
 
Assessment : 
 
62 y.o. 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 Clinical presentation c/w septic shock (POA) due to prevotella bloodstream infection (positive blood culture 2/29), sigmoid perforation, secondary peritonitis s/p emergent Ex lap on 2/29/20. Intra operatively found to have sigmoid colon perforation, 2/2 constipation with large amount of stool burden spilling into the peritoneum per Dr. Ana Belle. S/p Re-exploratory laparotomy, drainage of intra-abdominal stool collection, descending colectomy with mobilization of the splenic flexure, and transverse colostomy on 3/1. Significant pyuria on UA 2/29. Urine culture >100,000 e.coli suggestive of probable cystitis. Acute on chronic respiratory failure - likely due to sepsis. Sputum culture 3/1- moraxella (beta lactamase positive). Diffuse pulmonary opacities concerning for aspiration pneumonia. Single positive blood culture for epidermidis on 3/3 seemed likely contaminant since patient was improving off vancomycin. However, Blood culture 3/5: Positive for Staphylococcus epidermidis. Negative blood cultures 3/9/2020. At this time it is difficult to determine the significance of staph epidermidis bacteremia. Management complicated by inability to remove central line since patient has difficult IV access and unable to obtain peripheral IV line.   Orders for midline placed on 3/9/2020. abx management complicated due to h/o pcn allergy. Currently tolerating pip/tazo without difficulties. Clinically better. Off pressors. Negative fungitell argues against fungemia. Recommendations: 1. continue pip/tazo, vancomycin 2. Monitor for intra abdominal abscess. 3. F/u surgery recommendations 4. Obtain US guided midline and remove right IJ CVC -discussed with Brenda Hawkins in interventional radiology. They will attempt to place it today depending on the schedule. 5. Will monitor renal function since pip/tazo, vancomycin combination carries high risk of nephrotoxicity 6. Monitor CBC, clinically to determine the duration of antibiotic. Above plan was discussed in details with primary team. Please call me if any further questions or concerns. Will continue to participate in the care of this patient. HPI: 
 
Denies significant abdominal pain. No nausea or vomiting. No subjective fever/chills. home Medication List  
 Details  
roflumilast (DALIRESP) 250 mcg tab Take 250 mcg by mouth daily. Qty: 30 Tab, Refills: 0  
  
ARIPiprazole (ABILIFY) 10 mg tablet Take 1 Tab by mouth. aspirin-calcium carbonate 81 mg-300 mg calcium(777 mg) tab Take 1 Tab by mouth. divalproex DR (DEPAKOTE) 250 mg tablet Take 1 Tab by mouth. doxycycline (MONODOX) 100 mg capsule   
  
gabapentin (NEURONTIN) 300 mg capsule Take 1 Cap by mouth. ipratropium (ATROVENT) 0.02 % soln   
  
methylPREDNISolone (MEDROL DOSEPACK) 4 mg tablet   
  
oxyCODONE-acetaminophen (PERCOCET) 5-325 mg per tablet Take 1 Tab by mouth. tolterodine (DETROL) 1 mg tablet Take 1 Tab by mouth. traMADol (ULTRAM) 50 mg tablet Take 1 Tab by mouth. traZODone (DESYREL) 100 mg tablet Take 1 Tab by mouth. !! busPIRone (BUSPAR) 15 mg tablet Take 15 mg by mouth two (2) times a day. Indications: repeated episodes of anxiety Qty: 60 Tab, Refills: 2 OXYGEN-AIR DELIVERY SYSTEMS 3 L/min by Nasal route continuous. First Choice O2  
  
albuterol-ipratropium (DUO-NEB) 2.5 mg-0.5 mg/3 ml nebu 3 mL by Nebulization route every six (6) hours as needed for Wheezing. Qty: 30 Nebule, Refills: 1  
  
!! busPIRone (BUSPAR) 10 mg tablet Take 10 mg by mouth three (3) times daily. Indications: repeated episodes of anxiety  
  
cholecalciferol (VITAMIN D3) (1000 Units /25 mcg) tablet Take 1,000 Units by mouth two (2) times a day. levothyroxine (SYNTHROID) 100 mcg tablet Take 100 mcg by mouth Daily (before breakfast). insulin glargine (LANTUS U-100 INSULIN) 100 unit/mL injection 20 Units by SubCUTAneous route Daily (before breakfast). famotidine (PEPCID) 20 mg tablet Take 20 mg by mouth nightly. aspirin 81 mg chewable tablet Take 1 Tab by mouth daily (with breakfast). Indications: stroke prevention 
Qty: 30 Tab, Refills: 0  
  
docusate sodium (COLACE) 100 mg capsule Take 1 Cap by mouth daily (after breakfast). Indications: constipation 
Qty: 30 Cap, Refills: 0  
  
predniSONE (DELTASONE) 20 mg tablet Take 20 mg by mouth daily (with breakfast). Indications: sarcoidosis Qty: 30 Tab, Refills: 0 Associated Diagnoses: Sarcoidosis  
  
ascorbic acid, vitamin C, (VITAMIN C) 250 mg tablet Take 1 Tab by mouth daily (with breakfast). Qty: 30 Tab, Refills: 0 Associated Diagnoses: Acute blood loss as cause of postoperative anemia  
  
calcium citrate 200 mg (950 mg) tablet Take 1 Tab by mouth two (2) times a day. Indications: post-menopausal osteoporosis prevention 
Qty: 60 Tab, Refills: 0 Associated Diagnoses: Status post laminectomy with spinal fusion  
  
ferrous sulfate 325 mg (65 mg iron) tablet Take 1 Tab by mouth daily (with breakfast). Qty: 30 Tab, Refills: 0  Associated Diagnoses: Acute blood loss as cause of postoperative anemia  
  
acetaminophen (TYLENOL) 325 mg tablet Take 2 Tabs by mouth every four (4) hours as needed for Pain. Indications: pain 
Qty: 60 Tab, Refills: 0 Associated Diagnoses: Status post laminectomy with spinal fusion  
  
brief disposable (ADULT) misc by Does Not Apply route. Qty: 1 Package, Refills: 0 Associated Diagnoses: Urge urinary incontinence  
  
methadone (DOLOPHINE) 5 mg tablet Take 4 Tabs by mouth daily. Max Daily Amount: 20 mg. 
Qty: 10 Tab, Refills: 0 Associated Diagnoses: History of back injury  
  
polyethylene glycol (MIRALAX) 17 gram/dose powder Take 17 g by mouth daily. 1 tablespoon with 8 oz of water daily 
Qty: 289 g, Refills: 0  
  
umeclidinium-vilanterol (ANORO ELLIPTA) 62.5-25 mcg/actuation inhaler Take 1 Puff by inhalation daily. Qty: 1 Inhaler, Refills: 0 BD INSULIN SYRINGE ULTRA-FINE 1 mL 31 gauge x 5/16 syrg   
  
rosuvastatin (CRESTOR) 5 mg tablet TAKE ONE TABLET NIGHTLY Qty: 90 Tab, Refills: 3  
  
albuterol (PROAIR HFA) 90 mcg/actuation inhaler INHALE 2 PUFFS EVERY 4 HOURS AS NEEDED FOR WHEEZING Qty: 1 Inhaler, Refills: 5  
  
oxybutynin (DITROPAN) 5 mg tablet Take 5 mg by mouth two (2) times a day. allopurinol (ZYLOPRIM) 100 mg tablet Take 100 mg by mouth daily. insulin lispro (HUMALOG) 100 unit/mL injection To be given 15 min before meals: < 150 - None, 151-200 - 2 u, 201-250 - 4 u, 251-300 - 6 u, 301-350 - 8 u, 351-400 - 10 u, >400 - 12 u Qty: 1 Vial, Refills: 0 Associated Diagnoses: Type 2 diabetes mellitus with stage 3 chronic kidney disease, with long-term current use of insulin ([facility])  
  
insulin syringe,safetyneedle 1 mL 30 gauge x 5/16\" syrg As directed Qty: 50 Each, Refills: 0 Nebulizer Accessories Kit Use with nebulizer machine Qty: 1 Kit, Refills: prn  
 Associated Diagnoses: COPD (chronic obstructive pulmonary disease) ([facility])  
  
sertraline (ZOLOFT) 50 mg tablet Take 50 mg by mouth daily. Indications: Bipolar Depression Current Facility-Administered Medications Medication Dose Route Frequency  [START ON 9/48/6405] multivit-folic acid-herbal 504 (WELLESSE PLUS) oral liquid 30 mL  30 mL Oral DAILY  predniSONE (DELTASONE) tablet 30 mg  30 mg Oral DAILY WITH BREAKFAST  potassium chloride 10 mEq in 50 ml IVPB  10 mEq IntraVENous Q1H  
 levothyroxine (SYNTHROID) tablet 100 mcg  100 mcg Oral 6am  
 vancomycin trough due 03/11 at 1130 (draw before dose)  1 Each Other ONCE  
 apixaban (ELIQUIS) tablet 5 mg  5 mg Oral BID  
 amiodarone (CORDARONE) tablet 200 mg  200 mg Oral BID  sodium chloride 3% hypertonic nebulizer soln  4 mL Nebulization BID RT  
 vancomycin (VANCOCIN) 1,000 mg in 0.9% sodium chloride (MBP/ADV) 250 mL adv  1,000 mg IntraVENous Q12H  
 fentaNYL citrate (PF) injection  mcg   mcg IntraVENous Q2H PRN  
 insulin glargine (LANTUS) injection 16 Units  16 Units SubCUTAneous DAILY  piperacillin-tazobactam (ZOSYN) 4.5 g in 0.9% sodium chloride (MBP/ADV) 100 mL MBP  4.5 g IntraVENous Q6H  
 acetaminophen (TYLENOL) solution 650 mg  650 mg Per NG tube Q4H PRN  
 diphenhydrAMINE (BENADRYL) injection 25 mg  25 mg IntraVENous Q4H PRN  
 sodium chloride (NS) flush 5-10 mL  5-10 mL IntraVENous PRN  
 sodium chloride (NS) flush 5-40 mL  5-40 mL IntraVENous PRN  pantoprazole (PROTONIX) 40 mg in 0.9% sodium chloride 10 mL injection  40 mg IntraVENous Q12H  
 albuterol-ipratropium (DUO-NEB) 2.5 MG-0.5 MG/3 ML  3 mL Nebulization Q4H RT  
 albuterol (ACCUNEB) nebulizer solution 1.25 mg  1.25 mg Nebulization Q6H PRN  
 budesonide (PULMICORT) 500 mcg/2 ml nebulizer suspension  500 mcg Nebulization BID RT  
 insulin lispro (HUMALOG) injection   SubCUTAneous Q6H  
 glucose chewable tablet 16 g  4 Tab Oral PRN  
 glucagon (GLUCAGEN) injection 1 mg  1 mg IntraMUSCular PRN  
 dextrose (D50W) injection syrg 12.5-25 g  25-50 mL IntraVENous PRN Allergies: Moxifloxacin; Pcn [penicillins]; and Sulfa (sulfonamide antibiotics) Temp (24hrs), Av.3 °F (36.8 °C), Min:97.5 °F (36.4 °C), Max:99.8 °F (37.7 °C) Visit Vitals /82 Pulse 94 Temp 97.8 °F (36.6 °C) Resp 25 Ht 5' 4\" (1.626 m) Wt 88 kg (194 lb 0.1 oz) LMP 2012 (Exact Date) SpO2 100% BMI 33.30 kg/m² ROS: 12 point review of systems obtained in details pertinent positive as mentioned in history of present illness, otherwise negative Physical Exam: 
 
General:   Laying on the bed, alert awake oriented x3, appears comfortable Head:  Normocephalic, without obvious abnormality, atraumatic. Eyes:  Conjunctivae/corneas clear. PERRL, Nose: Nares normal. Septum midline. Mucosa normal. .  
Throat: Lips, mucosa, and tongue normal. Teeth and gums normal.  
Neck: Supple, symmetrical, trachea midline, no adenopathy, no carotid bruit and no JVD. Right IJ CVC in place. Lungs:    Clear to auscultation bilaterally; no wheezes/rales noted. Heart:  RRR, S1, S2 normal, no m/r/g Abdomen:   Soft,  no significant tenderness. +midline surgical incision, dressing c/d/i. Present bowel sounds. No masses,  No organomegaly. Extremities: Extremities normal, atraumatic, no cyanosis or edema. Pulses: 2+ and symmetric all extremities. Skin: Skin color, texture, turgor normal. No rashes or lesions Neurologic:  Alert awake oriented x3. No gross motor or sensory deficits noted Labs: Results:  
Chemistry Recent Labs  
  20 
0400 03/10/20 
0609 20 
0530 GLU 72* 98 106* * 147* 149*  
K 2.6* 2.5* 2.9*  
 108 113* CO2 32 34* 30  
BUN 9 8 10 CREA 0.55* 0.56* 0.59* CA 7.8* 8.1* 8.0* AGAP 5 5 6 BUCR 16 14 17 AP 50 51 54  
TP 5.9* 5.5* 5.4* ALB 1.5* 1.5* 1.5*  
GLOB 4.4* 4.0 3.9 AGRAT 0.3* 0.4* 0.4* CBC w/Diff Recent Labs  
  20 
0400 03/10/20 
0609 20 
0530 WBC 7.9 6.4 9.2  
RBC 3.19* 3.12* 3.23* HGB 8.5* 8.3* 8.6* HCT 27.1* 26.7* 27.8*  
 252 167 GRANS 51 54 79* LYMPH 23 27 9*  
 EOS 3 0 0 Microbiology Recent Labs 03/09/20 
1159 03/09/20 
1154 CULT NO GROWTH 2 DAYS NO GROWTH 2 DAYS  
  
 
 
RADIOLOGY: 
 
All available imaging studies/reports in [facility] for this admission were reviewed Dr. Baron Chaudhry, Infectious Disease Specialist 
[phone] March 11, 2020 
8:38 AM

## 2020-03-11 NOTE — PROGRESS NOTES
Patient completed ROM as follows. Bilateral Lower Extremity Exercise: 
 
 
EXERCISE Sets Reps Active Active Assist  
Passive Self ROM Comments Ankle Pumps 1 10  [] [] [x] [] Heel Slides 1 10  [] [] [x] [] Hip Abd/adduction 1 10 [] [] [x] [] Quad Sets   [] [] [] [] Hold for 5 secs Glut Sets   [] [] [] [] Hold for 5 secs Short Arc Quads   [] [] [] [] Straight Leg Raises   [] [] [] [] Seated Long Arc Quads   [] [] [] [] Seated Marching   [] [] [] [] Seated HS curl   [] [] [] []   
Standing Marching   [] [] [] []   
 
 
Pain:  
Pain Level Before FMP: Pt showed no signs of pain. Pain Level After FMP: Same 
 
[x]  Alerted nurse. [x]  Call bell and phone within patient reach. [x]   Patient resting in bed with no apparent distress . NOTE: Pt was extremely drowsy at the beginning of the session, then she woke up towards the end of the session. She did not state that she was in any pain during the session. Nursing was present throughout session. Thank you, 
Sadi Pastor, Rehab Technician

## 2020-03-11 NOTE — ADVANCED PRACTICE NURSE
2000 
Bedside SBAR report taken from [address] Patient alert and oriented, continuously asks for water (in which she gets it thickened). Her NGT will be pulled per order. Has a wound vac that is working well, also has a pure wick. No family present. [address] NGtube pulled. [address] [place] [address] Rd Provided patient with fentanyl prior to bathing patient. Bathed patient. She had very loose stools that continued to flow. Unable to place Mepilex at this time. Notices 3 areas on buttocks with top layer removed on each ?friction. FMS placed due to wounds and very loose stool that is continuous. Changed pure wick [address] [facility] Provided patient with Fentanyl for pain in her abdomen 0680 Fentanyl given prior to bathing patient again Changed pure wick, cleaned patient up from incontinent urine. Changed gown. Jonathan George Bedside SBAR report given to incoming Nurse Jarred Lazar.

## 2020-03-12 NOTE — ROUTINE PROCESS
TRANSFER - IN REPORT: 
 
Verbal report received from Heather Huynh RN (name) on [address]  being received from ICU (unit) for routine progression of care Report consisted of patients Situation, Background, Assessment and  
Recommendations(SBAR). Information from the following report(s) SBAR, Kardex, Intake/Output, MAR, Recent Results and Cardiac Rhythm NSR on monitor was reviewed with the receiving nurse. Opportunity for questions and clarification was provided. Assessment completed upon patients arrival to unit and care assumed.

## 2020-03-12 NOTE — DIABETES MGMT
GLYCEMIC CONTROL PLAN OF CARE Assessment/Recommendations: 
Lab glucose this am 85 mg/dl Noted Lantus dose decreased to 10 units daily Continue corrective insulin coverage as needed. Will continue inpatient monitoring. Most recent blood glucose values: 
Results for Bobbi Abraham (MRN [id number]) as of 3/12/2020 09:30 Ref. Range 3/11/2020 06:09 3/11/2020 10:23 3/11/2020 10:24 3/11/2020 21:30 3/12/2020 07:22 GLUCOSE,FAST - POC Latest Ref Range: 70 - 110 mg/dL 76 94 95 122 (H) 88 Current A1C of 8.2 % is equivalent to average blood glucose of 189_mg/dl over the past 2-3 months. Current hospital diabetes medications:  
Lantus 10 units daily Lispro corrective insulin coverage every 6 hours as needed Previous day's insulin requirements:  
Lantus 16 units Lispro 0 units corrective insulin Home diabetes medications:  Per pta med list 
Lantus 20 units daily before breakfast 
Humalog AC based on her blood sugar reading Diet:   
NPO. Dysphagia pureed. supplements Education:  ____Refer to Diabetes Education Record  
          _x__Education not indicated at this time Miley Mai, RN CDE Ext [phone]

## 2020-03-12 NOTE — PROGRESS NOTES
[facility] Hospitalist Group Progress Note Patient: Carrie Nolan Age: 62 y.o. : 1955 MR#: [id number] SSN: [id number] Date/Time: 3/12/2020 Subjective:  
 
Review of systems Patient says history is feeling tired Her oral cavity feels better but still has some pain while eating No chest pain no nausea vomiting diarrhea No cough Assessment/Plan:  
Patient with sigmoid colonic perforation, secondary to constipation with large amount of stool burden spilling into peritoneal, peritonitis, also present Prevotella bloodstream infection now status post ex lap with resection Also E. coli cystitis, acute on chronic respiratory failure. 1.  Sepsis-shock 2 acute hypoxic respiratory failure secondary to sepsis 3 cystitis E. coli present on admission 4 status post exploratory laparotomy-recovering well surgery signed off 
5 bacteremia-followed by ID  
6 anemia due to chronic illness 7 hypokalemia-on replacement 8 hypoalbuminemia-patient was on TPN with supplements now able to eat p.o. discussed with nutrition 9 Bilateral PNA - Sputum Cx collected 3/1 grew Moraxella catarrhalis 10 Oropharyngeal candida - on Nystatin Plan: 
 
-Oral cavity is clean and improved 
-Persistent hypokalemia-on r again replacement ordered - Continue Po diet , poor po intake from pain in mouth - hope fully with treatment of candida it will  improve  
 
-Complex patient with complex infective etiology, added problem will be penicillin allergic, patient is seen by ID will continue to monitor with ID 
 
-Follow electrolytes and correct 
 
-Follow with subspecialty Case discussed with:  [x]Patient  [] Family ( In room with patient )    []Nursing  []Case Management DVT Prophylaxis:  []Lovenox  []Hep SQ  []SCDs  []Coumadin   []On Heparin gtt Objective:  
VS:  
Visit Vitals /70 (BP 1 Location: Left arm, BP Patient Position: At rest) Pulse 86 Temp 98.7 °F (37.1 °C) Resp 18 Ht 5' 4\" (1.626 m) Wt 93.8 kg (206 lb 11.2 oz) LMP 2012 (Exact Date) SpO2 100% BMI 35.48 kg/m² Tmax/24hrs: Temp (24hrs), Av.1 °F (36.7 °C), Min:97.4 °F (36.3 °C), Max:98.9 °F (37.2 °C) IOBRIEF Intake/Output Summary (Last 24 hours) at 3/12/2020 1327 Last data filed at 3/12/2020 2570 Gross per 24 hour Intake 1470 ml Output 1570 ml Net -100 ml General: No distress noted Oropharyngeal mucosa wet and dirty -likely fungal Candida infection Cardiovascular: S1S2 - regular , No Murmur Pulmonary: Equal expansion , No Use of accessory muscles , No Rales No Rhonchi GI:  +BS in all four quadrants, soft, non-tender Extremities:  No edema; 2+ dorsalis pedis pulses bilaterally Neuro: No focal weakness Medications:  
Current Facility-Administered Medications Medication Dose Route Frequency  oxyCODONE-acetaminophen (PERCOCET) 5-325 mg per tablet 1 Tab  1 Tab Oral Q6H PRN  
 acetaminophen (TYLENOL) tablet 325 mg  325 mg Oral Q6H PRN  
 multivit-folic acid-herbal 095 (WELLESSE PLUS) oral liquid 30 mL  30 mL Oral DAILY  predniSONE (DELTASONE) tablet 30 mg  30 mg Oral DAILY WITH BREAKFAST  insulin lispro (HUMALOG) injection   SubCUTAneous AC&HS  pantoprazole (PROTONIX) tablet 40 mg  40 mg Oral ACB&D  
 nystatin (MYCOSTATIN) 100,000 unit/mL oral suspension 500,000 Units  500,000 Units Oral QID  potassium, sodium phosphates (NEUTRA-PHOS) packet 1 Packet  1 Packet Oral QID  insulin glargine (LANTUS) injection 10 Units  10 Units SubCUTAneous DAILY  vancomycin (VANCOCIN) 1250 mg in  ml infusion  1,250 mg IntraVENous Q12H  levothyroxine (SYNTHROID) tablet 100 mcg  100 mcg Oral 6am  
 apixaban (ELIQUIS) tablet 5 mg  5 mg Oral BID  
 amiodarone (CORDARONE) tablet 200 mg  200 mg Oral BID  sodium chloride 3% hypertonic nebulizer soln  4 mL Nebulization BID RT  
 piperacillin-tazobactam (ZOSYN) 4.5 g in 0.9% sodium chloride (MBP/ADV) 100 mL MBP  4.5 g IntraVENous Q6H  
 acetaminophen (TYLENOL) solution 650 mg  650 mg Per NG tube Q4H PRN  
 diphenhydrAMINE (BENADRYL) injection 25 mg  25 mg IntraVENous Q4H PRN  
 sodium chloride (NS) flush 5-10 mL  5-10 mL IntraVENous PRN  
 sodium chloride (NS) flush 5-40 mL  5-40 mL IntraVENous PRN  
 albuterol-ipratropium (DUO-NEB) 2.5 MG-0.5 MG/3 ML  3 mL Nebulization Q4H RT  
 albuterol (ACCUNEB) nebulizer solution 1.25 mg  1.25 mg Nebulization Q6H PRN  
 budesonide (PULMICORT) 500 mcg/2 ml nebulizer suspension  500 mcg Nebulization BID RT  
 glucose chewable tablet 16 g  4 Tab Oral PRN  
 glucagon (GLUCAGEN) injection 1 mg  1 mg IntraMUSCular PRN  
 dextrose (D50W) injection syrg 12.5-25 g  25-50 mL IntraVENous PRN Labs:   
Recent Labs  
  03/12/20 
0510 03/11/20 
0400 03/10/20 
4156 WBC 7.5 7.9 6.4 HGB 7.5* 8.5* 8.3* HCT 23.2* 27.1* 26.7*  
 316 252 Recent Labs  
  03/12/20 
0510 03/11/20 
2015 03/11/20 
0400 03/10/20 
7931  143 147* 147* K 3.0* 3.3* 2.6* 2.5*  
 108 110 108 CO2 29 30 32 34* GLU 85 104* 72* 98 BUN 8 8 9 8 CREA 0.46* 0.40* 0.55* 0.56* CA 7.6* 7.2* 7.8* 8.1*  
MG 1.7  --  2.2 1.6 PHOS 2.8  --  2.1* 2.7 ALB 1.4*  --  1.5* 1.5* SGOT 19  --  18 21 ALT 15  --  17 17 Signed By: Rock Watkins MD   
 March 12, 2020

## 2020-03-12 NOTE — PROGRESS NOTES
Cardiology progress note CARDIOLOGY PROGRESS NOTE 
RECS: 
 
 
 
1. Paroxymal Atrial flutter/atrial fibrillation  with RVR- maintaining NSR- continue  amiodarone po. On Eliquis 5 mg bid. 2. Anemia- H&H slightly dropping Will monitor. 3. Acute respiratory Failure- intubated. Extubated 3/7/20 - on O2 by Nasal canula 4. Acute Sigmoid Colon Perforation - s/p Emergent Ex-Lap with drainage of intraabdominal stool and collections; sigmoid colectomy with Dr. Rafa Veliz on 02/29/20.sigmoid perforation, secondary peritonitis  S/p Re-exploratory laparotomy, drainage of intra-abdominal stool collection, descending colectomy with mobilization of the splenic flexure, and transverse colostomy on 3/1. 
5. Septic Shock-improved  on antibiotics managed by [facility] and ID  
6. Hypotension- in the setting of #3 resolved 7. Acute on chronic diastolic heart failure-  Will monitor for s/s fluid overload 8. Hx sarcoidosis  
9. Pulmonary hypertension with PAP 80 mmHg on recent echo  
10. Abnormal TSH- medications per [facility] care 11. Hypokalemia- persistent- on neutra phos and K+ iv per medical team  
12. Persistent diarrhea has fecal management system Blood pressure is remaining stable and is mildly elevated. Will start low-dose Lasix and hopefully diurese gently watching the blood pressure. Remaining in sinus rhythm on amiodarone. completely, work-up for pulmonary hypertension will be considered when stable  
  
       
02/29/20 ECHO ADULT FOLLOW-UP OR LIMITED 03/03/2020 3/3/2020  
  Narrative · Dilated right ventricle. Reduced systolic function. Abnormal right  
ventricular septal motion. Systolic flattening of interventricular septum  
consistent with right ventricle pressure overload. · Moderate tricuspid valve regurgitation is present. · Severely elevated central venous pressure (15+ mmHg); IVC diameter is  
larger than 21 mm and collapses less than 50% with respiration. · Severe pulmonary hypertension. · Pulmonary arterial systolic pressure is 51.1 mmHg · Normal cavity size, wall thickness and systolic function (ejection  
fraction normal). Estimated left ventricular ejection fraction is 55 -  
60%. · Dilated right atrium. 
   
    Signed by: Quiana Kline MD  
 
 
 
ASSESSMENT: 
Hospital Problems  Date Reviewed: 2/28/2020 Codes Class Noted POA Perforated viscus ICD-10-CM: R19.8 ICD-9-CM: 799.89  2/29/2020 Unknown Septic shock (HCC) ICD-10-CM: A41.9, R65.21 ICD-9-CM: 038.9, 785.52, 995.92  2/29/2020 Unknown SUBJECTIVE: 
Patient alert. No c/o dyspnea. Weak. No distress noted Persistent diarrhea has fecal management system OBJECTIVE: 
 
VS:  
Visit Vitals /70 (BP 1 Location: Left arm, BP Patient Position: At rest) Pulse 88 Temp 98.9 °F (37.2 °C) Resp 18 Ht 5' 4\" (1.626 m) Wt 93.8 kg (206 lb 11.2 oz) SpO2 96% BMI 35.48 kg/m² Intake/Output Summary (Last 24 hours) at 3/12/2020 5118 Last data filed at 3/12/2020 2726 Gross per 24 hour Intake 1710 ml Output 1970 ml Net -260 ml  
 
TELE: normal sinus rhythm General: No acute distress HENT: Normocephalic, atraumatic. Neck :  Supple Cardiac:  Normal S1/S2 Chest/Lungs: harsh breath sound left lower lung. RL lobe diminished. Wheezes lower lung Abdomen: Soft, distended non tender Extremities: pedal edema noted. Labs: Results:  
   
Chemistry Recent Labs  
  03/12/20 
0510 03/11/20 2015 03/11/20 
0400 03/10/20 
7655 GLU 85 104* 72* 98  143 147* 147* K 3.0* 3.3* 2.6* 2.5*  
 108 110 108 CO2 29 30 32 34* BUN 8 8 9 8 CREA 0.46* 0.40* 0.55* 0.56* CA 7.6* 7.2* 7.8* 8.1*  
MG 1.7  --  2.2 1.6 PHOS 2.8  --  2.1* 2.7 AGAP 7 5 5 5 BUCR 17 20 16 14 AP 46  --  50 51  
TP 5.4*  --  5.9* 5.5* ALB 1.4*  --  1.5* 1.5*  
GLOB 4.0  --  4.4* 4.0 AGRAT 0.4*  --  0.3* 0.4* CBC w/Diff Recent Labs  
  03/12/20 
0510 03/11/20 
0400 03/10/20 
5328 WBC 7.5 7.9 6.4  
RBC 2.77* 3.19* 3.12* HGB 7.5* 8.5* 8.3* HCT 23.2* 27.1* 26.7*  
 316 252 GRANS 66 51 54 LYMPH 17* 23 27 EOS 0 3 0 Cardiac Enzymes No results for input(s): CPK, CKND1, SANTOS in the last 72 hours. No lab exists for component: Bulah Ratel Coagulation No results for input(s): PTP, INR, APTT, INREXT, INREXT in the last 72 hours. Lipid Panel Lab Results Component Value Date/Time Cholesterol, total 141 09/30/2019 12:00 AM  
 HDL Cholesterol 39 (L) 09/30/2019 12:00 AM  
 LDL, calculated 61 09/30/2019 12:00 AM  
 VLDL, calculated 41 (H) 09/30/2019 12:00 AM  
 Triglyceride 204 (H) 09/30/2019 12:00 AM  
 CHOL/HDL Ratio 3.5 11/06/2016 04:18 AM  
  
BNP No results for input(s): BNPP in the last 72 hours. Liver Enzymes Recent Labs  
  03/12/20 
0510 TP 5.4* ALB 1.4* AP 46 SGOT 19  
  
Digoxin Thyroid Studies Lab Results Component Value Date/Time TSH 5.11 (H) 03/05/2020 06:25 PM  
    
 
 
 
Suma E Halecki, NP -C I have independently evaluated and examined the patient. All relevant labs and testing data are reviewed. Care plan discussed and updated after review.  
Antony Castro MD

## 2020-03-12 NOTE — PROGRESS NOTES
Patient completed ROM as follows. Bilateral Upper Extremity Exercise: 
 
 
EXERCISE Sets Reps Active Active Assist  
Passive Self- assisted ROM Comments Shoulder horizontal abduction 1 10  [] [] [x] [] Unable to perform on right side due to position in bed Shoulder flexion     [] [] [] [] Shoulder extension     [] [] [] [] Elbow flex/ext  1  10 [] [] [x] [] Wrist flexion/extension  1  10 [] [] [x] [] Hand flex/ext  1  10 [] [x] [] []   
     [] [] [] [] Chair pushups   [] [] [] []   
 
 
Pain:  
Pain Level Before FMP: Pt stated that she did not have pain in BUE. Pain Level After FMP: Same 
 
[x]  Alerted nurse. [x]  Call bell and phone within patient reach. [x]   Patient resting in bed with no apparent distress . NOTE:  Pt was unable to do some exercises because of position in bed. Pt stated that she was having muscle spasms in BLE. She also stated the that pain was only in BLE. Thank you, 
Trupti Hitchcock, Rehab Technician

## 2020-03-12 NOTE — PROGRESS NOTES
Problem: Diabetes Self-Management Goal: *Disease process and treatment process Description: Define diabetes and identify own type of diabetes; list 3 options for treating diabetes. Outcome: Progressing Towards Goal 
Goal: *Incorporating nutritional management into lifestyle Description: Describe effect of type, amount and timing of food on blood glucose; list 3 methods for planning meals. Outcome: Progressing Towards Goal 
Goal: *Incorporating physical activity into lifestyle Description: State effect of exercise on blood glucose levels. Outcome: Progressing Towards Goal 
Goal: *Developing strategies to promote health/change behavior Description: Define the ABC's of diabetes; identify appropriate screenings, schedule and personal plan for screenings. Outcome: Progressing Towards Goal 
Goal: *Using medications safely Description: State effect of diabetes medications on diabetes; name diabetes medication taking, action and side effects. Outcome: Progressing Towards Goal 
Goal: *Monitoring blood glucose, interpreting and using results Description: Identify recommended blood glucose targets  and personal targets. Outcome: Progressing Towards Goal 
Goal: *Prevention, detection, treatment of acute complications Description: List symptoms of hyper- and hypoglycemia; describe how to treat low blood sugar and actions for lowering  high blood glucose level. Outcome: Progressing Towards Goal 
Goal: *Prevention, detection and treatment of chronic complications Description: Define the natural course of diabetes and describe the relationship of blood glucose levels to long term complications of diabetes. Outcome: Progressing Towards Goal 
Goal: *Developing strategies to address psychosocial issues Description: Describe feelings about living with diabetes; identify support needed and support network Outcome: Progressing Towards Goal 
Goal: *Insulin pump training Outcome: Progressing Towards Goal 
 Goal: *Sick day guidelines Outcome: Progressing Towards Goal 
Goal: *Patient Specific Goal (EDIT GOAL, INSERT TEXT) Outcome: Progressing Towards Goal 
  
Problem: Patient Education: Go to Patient Education Activity Goal: Patient/Family Education Outcome: Progressing Towards Goal 
  
Problem: Falls - Risk of 
Goal: *Absence of Falls Description: Document Tai Reas Fall Risk and appropriate interventions in the flowsheet. Outcome: Progressing Towards Goal 
Note: Fall Risk Interventions: 
Mobility Interventions: Bed/chair exit alarm Mentation Interventions: Bed/chair exit alarm, Evaluate medications/consider consulting pharmacy Medication Interventions: Bed/chair exit alarm, Evaluate medications/consider consulting pharmacy Elimination Interventions: Bed/chair exit alarm History of Falls Interventions: Bed/chair exit alarm, Evaluate medications/consider consulting pharmacy Problem: Patient Education: Go to Patient Education Activity Goal: Patient/Family Education Outcome: Progressing Towards Goal 
  
Problem: Pressure Injury - Risk of 
Goal: *Prevention of pressure injury Description: Document Florian Scale and appropriate interventions in the flowsheet. Outcome: Progressing Towards Goal 
Note: Pressure Injury Interventions: 
Sensory Interventions: Assess changes in LOC, Keep linens dry and wrinkle-free, Maintain/enhance activity level Moisture Interventions: Absorbent underpads, Internal/External fecal devices, Internal/External urinary devices, Check for incontinence Q2 hours and as needed Activity Interventions: Increase time out of bed, Pressure redistribution bed/mattress(bed type) Mobility Interventions: HOB 30 degrees or less, Pressure redistribution bed/mattress (bed type) Nutrition Interventions: Document food/fluid/supplement intake Friction and Shear Interventions: HOB 30 degrees or less Problem: Patient Education: Go to Patient Education Activity Goal: Patient/Family Education Outcome: Progressing Towards Goal 
  
Problem: Diabetes Maintenance:Admission Goal: *Blood glucose 80 to 180 mg/dl Outcome: Progressing Towards Goal 
  
Problem: Nutrition Deficit Goal: *Optimize nutritional status Outcome: Progressing Towards Goal 
  
Problem: Surgical Wound Care Goal: *Non-infected Wound: Absence of infection signs and symptoms Description: Infection control procedures (eg: clean dressings, clean gloves, hand washing, precautions to isolate wound from contamination, sterile instruments used for wound debridement) should be implemented. Outcome: Progressing Towards Goal 
Goal: *Infected Wound: Prevention of further infection and promotion of healing Description: Consider the use of systemic antibiotics in patients with cellulitis, osteomyelitis, bacteremia, or sepsis if there are no contraindications. Outcome: Progressing Towards Goal 
Goal: *Improvement of existing wound and maintenance of skin integrity Outcome: Progressing Towards Goal 
  
Problem: Patient Education: Go to Patient Education Activity Goal: Patient/Family Education Outcome: Progressing Towards Goal 
  
Problem: Injury - Risk of, Adverse Drug Event Goal: *Absence of adverse drug events Outcome: Progressing Towards Goal 
Goal: *Absence of medication errors Outcome: Progressing Towards Goal 
Goal: *Knowledge of prescribed medications Outcome: Progressing Towards Goal 
  
Problem: Patient Education: Go to Patient Education Activity Goal: Patient/Family Education Outcome: Progressing Towards Goal 
  
Problem: Patient Education: Go to Patient Education Activity Goal: Patient/Family Education Outcome: Progressing Towards Goal 
  
Problem: Patient Education: Go to Patient Education Activity Goal: Patient/Family Education Outcome: Progressing Towards Goal 
  
Problem: Patient Education: Go to Patient Education Activity Goal: Patient/Family Education Outcome: Progressing Towards Goal

## 2020-03-12 NOTE — PROGRESS NOTES
Patient completed ROM as follows. Bilateral Lower Extremity Exercise: 
 
 
EXERCISE Sets Reps Active Active Assist  
Passive Self ROM Comments Ankle Pumps 1 10  [] [] [x] [] Heel Slides 1 10  [] [] [x] [] Did knee flexion and extension only on right side Hip Abd/adduction 1 10 [] [] [x] [] Unable to do on right side Quad Sets   [] [] [] [] Hold for 5 secs Glut Sets   [] [] [] [] Hold for 5 secs Short Arc Quads   [] [] [] [] Straight Leg Raises   [] [] [] [] Seated Long Arc Quads   [] [] [] [] Seated Marching   [] [] [] [] Seated HS curl   [] [] [] []   
Standing Marching   [] [] [] []   
 
 
Pain:  
Pain Level Before FMP: 8 Pain Level After FMP: 9 [x]  Alerted nurse. [x]  Call bell and phone within patient reach. [x]   Patient resting in bed with no apparent distress . NOTE: Pt was unable to do some exercises because of position in bed. Pt stated that she was having muscle spasms in BLE. She also stated the that pain was only in BLE. Thank you, 
Sadi Pastor, Rehab Technician

## 2020-03-12 NOTE — ROUTINE PROCESS
Pt is alert and oriented x4. Pt denies pain. Pt tolerated thick liquids. Call bell in reach. Bed in low position/bed locked. 2130 Pt is awake and alert. Pt denies pain. 0115 TRANSFER - OUT REPORT: 
 
Verbal report given to Ana Nolan RN(name) on Stephen Sinha  being transferred to [facility](unit) for routine progression of care Report consisted of patients Situation, Background, Assessment and  
Recommendations(SBAR). Information from the following report(s) SBAR, Kardex, Intake/Output, MAR, Accordion, Recent Results and Cardiac Rhythm NSR was reviewed with the receiving nurse. Lines:  
Triple Lumen IJ Central Line 02/29/20 Right Internal jugular (Active) Central Line Being Utilized Yes 3/11/2020  8:52 AM  
Criteria for Appropriate Use Limited/no vessel suitable for conventional peripheral access 3/11/2020  8:52 AM  
Site Assessment Clean, dry, & intact 3/11/2020  8:52 AM  
Infiltration Assessment 0 3/11/2020  8:52 AM  
Affected Extremity/Extremities Color distal to insertion site pink (or appropriate for race) 3/11/2020  8:52 AM  
Date of Last Dressing Change 03/06/20 3/11/2020  8:52 AM  
Dressing Status Clean, dry, & intact 3/11/2020  8:52 AM  
Dressing Type Disk with Chlorhexadine gluconate (CHG); Transparent 3/11/2020  8:52 AM  
Action Taken Open ports on tubing capped 3/10/2020  8:00 PM  
Proximal Hub Color/Line Status White;Capped 3/11/2020  8:52 AM  
Positive Blood Return (Medial Site) Yes 3/11/2020  8:52 AM  
Medial Hub Color/Line Status Blue;Capped 3/11/2020  8:52 AM  
Positive Blood Return (Lateral Site) No 3/11/2020  8:52 AM  
Distal Hub Color/Line Status Brown;Capped 3/11/2020  8:52 AM  
Positive Blood Return (Site #3) Yes 3/11/2020  8:52 AM  
Alcohol Cap Used Yes 3/10/2020  8:00 PM  
  
 
Opportunity for questions and clarification was provided. Patient transported with: 
 O2 @ 3 liters Registered Nurse Tech Triple lumen cath to be dc'd after 2nd dose of KCl IV in AM per Dr Thomas Dec (Vanco IV infusing thru single lumen PICC line)- Vickey Dave RN made aware. 2787 Notified Leigh Smith (Daughter) pt transferred out to  [facility].

## 2020-03-12 NOTE — PROGRESS NOTES
Infectious Disease progress Note Reason: sepsis, gram negative bacteremia, staph epidermidis bacteremia Current abx Prior abx Pip/tazo since 3/1 Vancomycin since 3/6/20 Cefepime, metronidazole 2/29-3/1 Vancomycin 2/29-3/2/20 Lines:  
 
 
Assessment : 
 
62 y.o. 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 Clinical presentation c/w septic shock (POA) due to prevotella bloodstream infection (positive blood culture 2/29), sigmoid perforation, secondary peritonitis s/p emergent Ex lap on 2/29/20. Intra operatively found to have sigmoid colon perforation, 2/2 constipation with large amount of stool burden spilling into the peritoneum per Dr. Quintin Cardenas. S/p Re-exploratory laparotomy, drainage of intra-abdominal stool collection, descending colectomy with mobilization of the splenic flexure, and transverse colostomy on 3/1. Significant pyuria on UA 2/29. Urine culture >100,000 e.coli suggestive of probable cystitis. Acute on chronic respiratory failure - likely due to sepsis. Sputum culture 3/1- moraxella (beta lactamase positive). Diffuse pulmonary opacities concerning for aspiration pneumonia. Single positive blood culture for epidermidis on 3/3 seemed likely contaminant since patient was improving off vancomycin. However, Blood culture 3/5: Positive for Staphylococcus epidermidis. Negative blood cultures 3/9/2020. At this time it is difficult to determine the significance of staph epidermidis bacteremia. Management complicated by inability to remove central line since patient has difficult IV access and unable to obtain peripheral IV line.   Orders for midline placed on 3/9/2020. abx management complicated due to h/o pcn allergy. Currently tolerating pip/tazo without difficulties. Clinically better. Off pressors. Negative fungitell argues against fungemia. Recommendations: 1. continue pip/tazo, d/c vancomycin 2. Monitor for intra abdominal abscess. 3. F/u surgery recommendations 4. Monitor CBC, clinically to determine the duration of antibiotic. Above plan was discussed in details with primary team. Please call me if any further questions or concerns. Will continue to participate in the care of this patient. HPI: 
 
Denies significant abdominal pain. No nausea or vomiting. No subjective fever/chills. home Medication List  
 Details  
roflumilast (DALIRESP) 250 mcg tab Take 250 mcg by mouth daily. Qty: 30 Tab, Refills: 0  
  
ARIPiprazole (ABILIFY) 10 mg tablet Take 1 Tab by mouth. aspirin-calcium carbonate 81 mg-300 mg calcium(777 mg) tab Take 1 Tab by mouth. divalproex DR (DEPAKOTE) 250 mg tablet Take 1 Tab by mouth. doxycycline (MONODOX) 100 mg capsule   
  
gabapentin (NEURONTIN) 300 mg capsule Take 1 Cap by mouth. ipratropium (ATROVENT) 0.02 % soln   
  
methylPREDNISolone (MEDROL DOSEPACK) 4 mg tablet   
  
oxyCODONE-acetaminophen (PERCOCET) 5-325 mg per tablet Take 1 Tab by mouth. tolterodine (DETROL) 1 mg tablet Take 1 Tab by mouth. traMADol (ULTRAM) 50 mg tablet Take 1 Tab by mouth. traZODone (DESYREL) 100 mg tablet Take 1 Tab by mouth. !! busPIRone (BUSPAR) 15 mg tablet Take 15 mg by mouth two (2) times a day. Indications: repeated episodes of anxiety Qty: 60 Tab, Refills: 2 OXYGEN-AIR DELIVERY SYSTEMS 3 L/min by Nasal route continuous. [employer] O2  
  
albuterol-ipratropium (DUO-NEB) 2.5 mg-0.5 mg/3 ml nebu 3 mL by Nebulization route every six (6) hours as needed for Wheezing.  
Qty: 30 Nebule, Refills: 1  
  
!! busPIRone (BUSPAR) 10 mg tablet Take 10 mg by mouth three (3) times daily. Indications: repeated episodes of anxiety  
  
cholecalciferol (VITAMIN D3) (1000 Units /25 mcg) tablet Take 1,000 Units by mouth two (2) times a day. levothyroxine (SYNTHROID) 100 mcg tablet Take 100 mcg by mouth Daily (before breakfast). insulin glargine (LANTUS U-100 INSULIN) 100 unit/mL injection 20 Units by SubCUTAneous route Daily (before breakfast). famotidine (PEPCID) 20 mg tablet Take 20 mg by mouth nightly. aspirin 81 mg chewable tablet Take 1 Tab by mouth daily (with breakfast). Indications: stroke prevention 
Qty: 30 Tab, Refills: 0  
  
docusate sodium (COLACE) 100 mg capsule Take 1 Cap by mouth daily (after breakfast). Indications: constipation 
Qty: 30 Cap, Refills: 0  
  
predniSONE (DELTASONE) 20 mg tablet Take 20 mg by mouth daily (with breakfast). Indications: sarcoidosis Qty: 30 Tab, Refills: 0 Associated Diagnoses: Sarcoidosis  
  
ascorbic acid, vitamin C, (VITAMIN C) 250 mg tablet Take 1 Tab by mouth daily (with breakfast). Qty: 30 Tab, Refills: 0 Associated Diagnoses: Acute blood loss as cause of postoperative anemia  
  
calcium citrate 200 mg (950 mg) tablet Take 1 Tab by mouth two (2) times a day. Indications: post-menopausal osteoporosis prevention 
Qty: 60 Tab, Refills: 0 Associated Diagnoses: Status post laminectomy with spinal fusion  
  
ferrous sulfate 325 mg (65 mg iron) tablet Take 1 Tab by mouth daily (with breakfast). Qty: 30 Tab, Refills: 0 Associated Diagnoses: Acute blood loss as cause of postoperative anemia  
  
acetaminophen (TYLENOL) 325 mg tablet Take 2 Tabs by mouth every four (4) hours as needed for Pain. Indications: pain 
Qty: 60 Tab, Refills: 0 Associated Diagnoses: Status post laminectomy with spinal fusion  
  
brief disposable (ADULT) misc by Does Not Apply route. Qty: 1 Package, Refills: 0 Associated Diagnoses: Urge urinary incontinence  
  
methadone (DOLOPHINE) 5 mg tablet Take 4 Tabs by mouth daily.  Max Daily Amount: 20 mg. 
Qty: 10 Tab, Refills: 0 Associated Diagnoses: History of back injury  
  
polyethylene glycol (MIRALAX) 17 gram/dose powder Take 17 g by mouth daily. 1 tablespoon with 8 oz of water daily 
Qty: 289 g, Refills: 0  
  
umeclidinium-vilanterol (ANORO ELLIPTA) 62.5-25 mcg/actuation inhaler Take 1 Puff by inhalation daily. Qty: 1 Inhaler, Refills: 0 BD INSULIN SYRINGE ULTRA-FINE 1 mL 31 gauge x 5/16 syrg   
  
rosuvastatin (CRESTOR) 5 mg tablet TAKE ONE TABLET NIGHTLY Qty: 90 Tab, Refills: 3  
  
albuterol (PROAIR HFA) 90 mcg/actuation inhaler INHALE 2 PUFFS EVERY 4 HOURS AS NEEDED FOR WHEEZING Qty: 1 Inhaler, Refills: 5  
  
oxybutynin (DITROPAN) 5 mg tablet Take 5 mg by mouth two (2) times a day. allopurinol (ZYLOPRIM) 100 mg tablet Take 100 mg by mouth daily. insulin lispro (HUMALOG) 100 unit/mL injection To be given 15 min before meals: < 150 - None, 151-200 - 2 u, 201-250 - 4 u, 251-300 - 6 u, 301-350 - 8 u, 351-400 - 10 u, >400 - 12 u Qty: 1 Vial, Refills: 0 Associated Diagnoses: Type 2 diabetes mellitus with stage 3 chronic kidney disease, with long-term current use of insulin ([facility])  
  
insulin syringe,safetyneedle 1 mL 30 gauge x 5/16\" syrg As directed Qty: 50 Each, Refills: 0 Nebulizer Accessories Kit Use with nebulizer machine Qty: 1 Kit, Refills: prn  
 Associated Diagnoses: COPD (chronic obstructive pulmonary disease) ([facility])  
  
sertraline (ZOLOFT) 50 mg tablet Take 50 mg by mouth daily. Indications: Bipolar Depression Current Facility-Administered Medications Medication Dose Route Frequency  multivit-folic acid-herbal 068 (WELLESSE PLUS) oral liquid 30 mL  30 mL Oral DAILY  predniSONE (DELTASONE) tablet 30 mg  30 mg Oral DAILY WITH BREAKFAST  insulin lispro (HUMALOG) injection   SubCUTAneous AC&HS  pantoprazole (PROTONIX) tablet 40 mg  40 mg Oral ACB&D  
 nystatin (MYCOSTATIN) 100,000 unit/mL oral suspension 500,000 Units 500,000 Units Oral QID  potassium, sodium phosphates (NEUTRA-PHOS) packet 1 Packet  1 Packet Oral QID  insulin glargine (LANTUS) injection 10 Units  10 Units SubCUTAneous DAILY  vancomycin (VANCOCIN) 1250 mg in  ml infusion  1,250 mg IntraVENous Q12H  levothyroxine (SYNTHROID) tablet 100 mcg  100 mcg Oral 6am  
 apixaban (ELIQUIS) tablet 5 mg  5 mg Oral BID  
 amiodarone (CORDARONE) tablet 200 mg  200 mg Oral BID  sodium chloride 3% hypertonic nebulizer soln  4 mL Nebulization BID RT  
 fentaNYL citrate (PF) injection  mcg   mcg IntraVENous Q2H PRN  piperacillin-tazobactam (ZOSYN) 4.5 g in 0.9% sodium chloride (MBP/ADV) 100 mL MBP  4.5 g IntraVENous Q6H  
 acetaminophen (TYLENOL) solution 650 mg  650 mg Per NG tube Q4H PRN  
 diphenhydrAMINE (BENADRYL) injection 25 mg  25 mg IntraVENous Q4H PRN  
 sodium chloride (NS) flush 5-10 mL  5-10 mL IntraVENous PRN  
 sodium chloride (NS) flush 5-40 mL  5-40 mL IntraVENous PRN  
 albuterol-ipratropium (DUO-NEB) 2.5 MG-0.5 MG/3 ML  3 mL Nebulization Q4H RT  
 albuterol (ACCUNEB) nebulizer solution 1.25 mg  1.25 mg Nebulization Q6H PRN  
 budesonide (PULMICORT) 500 mcg/2 ml nebulizer suspension  500 mcg Nebulization BID RT  
 glucose chewable tablet 16 g  4 Tab Oral PRN  
 glucagon (GLUCAGEN) injection 1 mg  1 mg IntraMUSCular PRN  
 dextrose (D50W) injection syrg 12.5-25 g  25-50 mL IntraVENous PRN Allergies: Moxifloxacin; Pcn [penicillins]; and Sulfa (sulfonamide antibiotics) Temp (24hrs), Av.1 °F (36.7 °C), Min:97.4 °F (36.3 °C), Max:98.9 °F (37.2 °C) Visit Vitals /70 (BP 1 Location: Left arm, BP Patient Position: At rest) Pulse 86 Temp 98.7 °F (37.1 °C) Resp 18 Ht 5' 4\" (1.626 m) Wt 93.8 kg (206 lb 11.2 oz) LMP 2012 (Exact Date) SpO2 100% BMI 35.48 kg/m² ROS: 12 point review of systems obtained in details pertinent positive as mentioned in history of present illness, otherwise negative Physical Exam: 
 
General:   Laying on the bed, alert awake oriented x3, appears comfortable Head:  Normocephalic, without obvious abnormality, atraumatic. Eyes:  Conjunctivae/corneas clear. PERRL, Nose: Nares normal. Septum midline. Mucosa normal. .  
Throat: Lips, mucosa, and tongue normal. Teeth and gums normal.  
Neck: Supple, symmetrical, trachea midline, no adenopathy, no carotid bruit and no JVD. Right IJ CVC in place. Lungs:    Clear to auscultation bilaterally; no wheezes/rales noted. Heart:  RRR, S1, S2 normal, no m/r/g Abdomen:   Soft,  no significant tenderness. +midline surgical incision, dressing c/d/i. Present bowel sounds. No masses,  No organomegaly. Extremities: Extremities normal, atraumatic, no cyanosis or edema. Pulses: 2+ and symmetric all extremities. Skin: Skin color, texture, turgor normal. No rashes or lesions Neurologic:  Alert awake oriented x3. No gross motor or sensory deficits noted Labs: Results:  
Chemistry Recent Labs  
  03/12/20 
0510 03/11/20 
2015 03/11/20 
0400 03/10/20 
0329 GLU 85 104* 72* 98  143 147* 147* K 3.0* 3.3* 2.6* 2.5*  
 108 110 108 CO2 29 30 32 34* BUN 8 8 9 8 CREA 0.46* 0.40* 0.55* 0.56* CA 7.6* 7.2* 7.8* 8.1* AGAP 7 5 5 5 BUCR 17 20 16 14 AP 46  --  50 51  
TP 5.4*  --  5.9* 5.5* ALB 1.4*  --  1.5* 1.5*  
GLOB 4.0  --  4.4* 4.0 AGRAT 0.4*  --  0.3* 0.4* CBC w/Diff Recent Labs  
  03/12/20 
0510 03/11/20 
0400 03/10/20 
0665 WBC 7.5 7.9 6.4  
RBC 2.77* 3.19* 3.12* HGB 7.5* 8.5* 8.3* HCT 23.2* 27.1* 26.7*  
 316 252 GRANS 66 51 54 LYMPH 17* 23 27 EOS 0 3 0 Microbiology No results for input(s): CULT in the last 72 hours. RADIOLOGY: 
 
All available imaging studies/reports in [facility] for this admission were reviewed Dr. Joao Manriquez, Infectious Disease Specialist 
[phone] March 12, 2020 8:38 AM

## 2020-03-12 NOTE — PROGRESS NOTES
[facility]. Pulmonary, Critical Care, and Sleep Medicine Name: Varsha Durbin MRN: [id number] : 1955 Hospital: [facility] Date: 3/12/2020  Admission Date: 2020 Chart and notes reviewed. Data reviewed. I have evaluated all findings. [x]I have reviewed the flowsheet and previous days notes. Interval HPI:Patient is a 62 y. o. female with PMH of COPD (on 3LPM NC home O2), Sarcoidosis, HTN, T2DM,  Hep C, recent Spine Thoracic Laminectomy T6-T11 with Fusion (20) 2/2 compression fx of T9-T10 and spinal cord compression at T9 and T10 and spinal cord edema with subsequent paraparesis, who presented to [facility] ED on 20 c/o acute abdominal pain, SOB with associated constipation x4-5 days PTA, likely 2/2 aforementioned spinal surgery on 19. CT Chest/Abd/Pelvis showed intraperitoneal free air consistent with perforated viscus. Required intubation, Underwent emergent ex lap , was found to have sigmoid colon perforation, 2/2 constipation w/ large stool burden spilling into the peritoneum per Dr. Agnes Stein. Dr. Agnes Stein took pt back to OR on 3/1 for abdominal washout and closure. ICU stay complicated by persistent vasopressor requirement and afib. Subjective 
 
 20 Complains of being too hot. Breathing is stable Overnight events: tolerating salter NC 4L well. Mentation/Activity: AxO x3, following commands Respiratory- dry cough Hemodynamics: H/H stable, off pressors Diet: Tube feeds and p.o. 
 
    
ROS:Review of systems not obtained due to patient factors. Events and notes from last 24 hours reviewed. Patient Active Problem List  
Diagnosis Code  Diabetic neuropathy associated with type 2 diabetes mellitus (HCC) E11.40  Venous insufficiency I87.2  Obesity, Class I, BMI 30-34.9 E66.9  Chronic back pain M54.9, G89.29  
 Generalized osteoarthritis of multiple sites M15.9  Bipolar affective disorder ([facility] F31.9  Abnormally low high density lipoprotein (HDL) cholesterol with hypertriglyceridemia E78.6, E78.1  Diastolic dysfunction without heart failure I51.89  Chronic obstructive pulmonary disease (COPD) ([facility] J44.9  Hypertensive heart disease without heart failure I11.9  Depression F32.9  History of back injury Z87.828  Type 2 diabetes mellitus with diabetic neuropathy, with long-term current use of insulin ([facility]) E11.40, Z79.4  Anxiety F41.9  Wears glasses Z97.3  History of hepatitis C Z86.19  
 Sickle cell trait ([facility]) D57.3  History of acute renal failure Z87.448  Hypothyroidism E03.9  Recurrent genital herpes A60.00  Sarcoidosis D86.9  Abscess of right arm L02.413  Hypoxemia requiring supplemental oxygen R09.02, Z99.81  
 Abnormal nuclear stress test R94.39  
 Cellulitis and abscess of hand L03.119, L02.519  
 Cellulitis and abscess of foot L03.119, L02.619  
 Cellulitis of arm, right L03. 113  
 Olecranon bursitis of right elbow M70.21  
 Contusion of left elbow S50. 02XA  Intravenous drug user F19.90  Right Achilles tendinitis M76.61  
 History of penicillin allergy Z88.0  Falls frequently R29.6  Gastroesophageal reflux disease with hiatal hernia K21.9, K44.9  Memory difficulty R41.3  Mixed connective tissue disease ([facility] M35.1  Impaired mobility and ADLs Z74.09  
 Hyperuricemia E79.0  History of vitamin D deficiency Z86.39  
 Urge urinary incontinence N39.41  Spinal cord compression ([facility]) G95.20  Compression fracture of body of thoracic vertebra ([facility]) S22.000A  Status post laminectomy with spinal fusion Z98.1  Acute blood loss as cause of postoperative anemia D62  
 History of fracture due to fall Z87.81  Chronic respiratory failure with hypoxia ([facility]) J96.11  
 Cellulitis of right forearm L03. 113  
 Gout M10.9  Menopause Z78.0  Long term current use of aspirin Z79.82  
  Opioid dependence ([facility] F11.20  Tobacco use disorder F17.200  Sepsis ([facility] A41.9  Perforated viscus R19.8  Septic shock (HCC) A41.9, R65.21 Vital Signs: 
Visit Vitals /70 (BP 1 Location: Left arm, BP Patient Position: At rest) Pulse 86 Temp 98.7 °F (37.1 °C) Resp 18 Ht 5' 4\" (1.626 m) Wt 93.8 kg (206 lb 11.2 oz) LMP 2012 (Exact Date) SpO2 100% BMI 35.48 kg/m² O2 Device: Nasal cannula O2 Flow Rate (L/min): 3 l/min Temp (24hrs), Av.1 °F (36.7 °C), Min:97.4 °F (36.3 °C), Max:98.9 °F (37.2 °C) Intake/Output:  
Last shift:      No intake/output data recorded. Last 3 shifts: 03/10 1901 -  0700 In: 3400 [P.O.:2070; I.V.:1330] Out: 0155 [Urine:1800; Drains:1350] Current Facility-Administered Medications Medication Dose Route Frequency  multivit-folic acid-herbal 734 (WELLESSE PLUS) oral liquid 30 mL  30 mL Oral DAILY  predniSONE (DELTASONE) tablet 30 mg  30 mg Oral DAILY WITH BREAKFAST  insulin lispro (HUMALOG) injection   SubCUTAneous AC&HS  pantoprazole (PROTONIX) tablet 40 mg  40 mg Oral ACB&D  
 nystatin (MYCOSTATIN) 100,000 unit/mL oral suspension 500,000 Units  500,000 Units Oral QID  potassium, sodium phosphates (NEUTRA-PHOS) packet 1 Packet  1 Packet Oral QID  insulin glargine (LANTUS) injection 10 Units  10 Units SubCUTAneous DAILY  vancomycin (VANCOCIN) 1250 mg in  ml infusion  1,250 mg IntraVENous Q12H  levothyroxine (SYNTHROID) tablet 100 mcg  100 mcg Oral 6am  
 apixaban (ELIQUIS) tablet 5 mg  5 mg Oral BID  
 amiodarone (CORDARONE) tablet 200 mg  200 mg Oral BID  sodium chloride 3% hypertonic nebulizer soln  4 mL Nebulization BID RT  
 piperacillin-tazobactam (ZOSYN) 4.5 g in 0.9% sodium chloride (MBP/ADV) 100 mL MBP  4.5 g IntraVENous Q6H  
 albuterol-ipratropium (DUO-NEB) 2.5 MG-0.5 MG/3 ML  3 mL Nebulization Q4H RT  
 budesonide (PULMICORT) 500 mcg/2 ml nebulizer suspension  500 mcg Nebulization BID RT Telemetry: [x]Sinus []A-flutter []Paced []A-fib []Multiple PVCs Physical Exam:  
  
General: Awake and alert, follows commands HEENT:  Anicteric sclerae; pink palpebral conjunctivae; mucosa moist 
Resp:  Symmetrical chest expansion, no accessory muscle use; good airway entry; no rales/ wheezing/ rhonchi noted CV:  S1, S2 present; regular rate and rhythm GI:  Abdomen soft, ostomy healthy, rectal tube, wound vac Extremities:  +2 pulses on all extremities; no edema/ cyanosis/ clubbing noted Skin:  Warm; no rashes/ lesions noted, normal turgor/cap refill Neurologic:  Non-focal, follows commands DATA: 
MAR reviewed and pertinent medications noted or modified as needed Labs: 
Recent Labs  
  03/12/20 
0510 03/11/20 
0400 03/10/20 
4074 WBC 7.5 7.9 6.4 HGB 7.5* 8.5* 8.3* HCT 23.2* 27.1* 26.7*  
 316 252 Recent Labs  
  03/12/20 
0510 03/11/20 
2015 03/11/20 
0400 03/10/20 
5855  143 147* 147* K 3.0* 3.3* 2.6* 2.5*  
 108 110 108 CO2 29 30 32 34* GLU 85 104* 72* 98 BUN 8 8 9 8 CREA 0.46* 0.40* 0.55* 0.56* CA 7.6* 7.2* 7.8* 8.1*  
MG 1.7  --  2.2 1.6 PHOS 2.8  --  2.1* 2.7 ALB 1.4*  --  1.5* 1.5* SGOT 19  --  18 21 ALT 15  --  17 17 No results for input(s): PH, PCO2, PO2, HCO3, FIO2 in the last 72 hours. No results for input(s): FIO2I, IFO2, HCO3I, IHCO3, HCOPOC, PCO2I, PCOPOC, IPHI, PHI, PHPOC, PO2I, PO2POC in the last 72 hours. No lab exists for component: IPOC2 Imaging: 
[x]   I have personally reviewed the patients radiographs and reports XR Results (most recent): 
Results from [facility] Encounter encounter on 02/29/20 XR CHEST SNGL V  
 Narrative EXAM:  XR CHEST SNGL V 
 
INDICATION:   f/u respiratory failure COMPARISON: 3/8/2020 FINDINGS:  
 
NG tube removed. No new parenchymal opacities in either lung. Stable heart size. No pneumothorax or other acute changes. Impression IMPRESSION: 
 
Stable chest.  
 
 
CT Results (most recent): 
Results from Hospital Encounter encounter on 02/29/20 CT CHEST ABD PELV W CONT Narrative CT SCAN OF THE CHEST, ABDOMEN AND PELVIS, WITH CONTRAST INDICATION: Above. Arrived with shortness of breath and abdominal pain. Septic 
shock. Hypoxia. History of sarcoidosis/COPD. Altered. Abdominal distention and 
tympani. Intraperitoneal free air on chest radiograph. Perforated viscus. COMPARISON: Chest CT 1/21/2010. No prior abdominopelvic CT in PACS. Report is 
noted in the electronic medical record (care everywhere) of previous noncontrast 
enhanced abdominopelvic CT performed elsewhere 7/21/2014. TECHNIQUE: With IV administration of 70 mL Isovue-300, without administration of 
oral contrast, helically acquired serial axial CT images through the chest, 
abdomen and pelvis were obtained. Additional coronal and sagittal reformation 
images were also performed. All CT scans at this facility are performed using dose optimization technique as 
appropriate to the performed exam, to include automated exposure control, 
adjustment of the mA and/or kV according to patient's size (Including 
appropriate matching for site-specific examinations), or use of iterative 
reconstruction technique. FINDINGS: 
 
CT chest:  
 
Endotracheal tube tip is in place cephalad to the level of the alyssa. Esophagogastric tube is noted, tip in the mid stomach. Severe pulmonary emphysematous disease again seen with extensive bullous changes 
most conspicuous in the upper lungs. Scattered scarring. There has been interval 
development of consolidative appearing lung opacities in the lower lobes 
bilaterally consistent with airspace disease likely in addition to atelectasis. The small stable subpleural pulmonary nodule described in the right lower lobe 
on the recent chest CT is again seen, slightly better visualized on the current study measuring approximately 4 mm, unchanged compared to the CT of 2/6/2018 
(axial 34). Multifocal chronic nodular pleuro-parenchymal scarring in the left 
upper lobe without significant interval change compared to the preceding chest 
CT or study of 2/6/2018. No definite suspicious new pulmonary nodule/mass identified compared to the 
preceding CT. No evidence of pathologic lymph node enlargement is seen. No evidence of pleural or significant pericardial effusion. CT abdomen/pelvis:  
 
Small ascites, best seen around the liver and in the anterior pelvis adjacent to 
the distal descending and sigmoid colon. The spleen is heterogeneous in attenuation and demonstrates several rounded 
hypodensities as detailed on the recent chest CT of 1/21/2020. Indeterminate attenuation masses in the kidneys bilaterally, 2.7 cm right kidney 
upper to midpole laterally, 2.1 cm left kidney interpolar region posteriorly. Further evaluation recommended, beginning with ultrasound might be helpful when 
clinically feasible if the corresponding lesions can be identified 
sonographically. Additional smaller subcentimeter renal hypodensities 
bilaterally, possibly cysts, too small to be specifically characterized. The liver, gallbladder, pancreas, and adrenal glands appear unremarkable. Scattered calcifications in the abdominal aorta and its major branches. The 
abdominal aorta enhances normally without abdominal aortic aneurysm. A few scattered small subcentimeter short axis lymph nodes bilaterally in the 
pelvis or retroperitoneum. No evidence of pathologic lymph node enlargement is 
seen. Moderate quantity of intraperitoneal free air in the nondependent anterior 
abdomen, consistent with perforated viscus.  Additional scattered foci of free 
air elsewhere in the peritoneum and mesentery, including in the right upper 
quadrant near the gallbladder/duodenum, and scattered bilaterally in the 
 mesenteric/peritoneal fat of the upper and lower abdomen. Grouped small foci of 
intraperitoneal free air are noted close to the colonic wall along the 
left/anterior side of the sigmoid colon (reference axial 112-119). The left 
hemicolon is diffusely mildly distended to the rectum containing gas, stool, and 
hyperdense material. Clinical correlation might be helpful regarding recent 
ingestion of hyperdense material. The rectum measures approximately 8.5 cm in 
caliber. The sigmoid measures approximately 7.3 cm in caliber on axial image 115. The site of the or free viscus is uncertain. Common sites could include 
duodenal or gastric perforation such as may be seen due to perforated ulcer. However, there is small focal hypoattenuation along the wall of the sigmoid 
colon on series 2 axial images 111-113 which could potentially reflect a small 
mural defect such as could be seen related to constipation, stercoral colitis, 
diverticular disease. The appendix is seen within the ascites in the right lower quadrant, assessment 
for stranding in the periappendiceal fat limited by the ascites, without gross 
abnormal thickening of the appendix seen. No gas within the appendix. The right 
hemicolon appears grossly unremarkable. There is mild diffuse mural thickening of small bowel loops and mild prominence 
of enhancement, nonspecific. No definite associated pneumatosis. No portal 
venous gas is seen. The SMA/SMV appear to maintain usual orientation. Broadly 
speaking differential considerations could include inflammatory, infectious, 
ischemic. Clinical correlation is recommended including with lactate 
measurement. Conceivably sequela of peritonitis in the setting of bowel 
perforation? Uterus is present. Small gas is noted within the uterus and vagina, nonspecific, 
can be seen as a physiologic finding. On review of bone windows, there is a superior endplate compression fracture with mildly to moderately decreased vertebral body height at L2. Bilateral 
spinal fusion rods spanning from R2-V7-G19-T12. Severe compression fracture 
deformity again seen at T9. Compression fracture with rather pronounced 
decreased vertebral body height again seen at T5. Mild endplate indentations at 
several other levels including T4, T6, T10. The posterior midline fluid 
collection described on the preceding study was better visualized on the 
preceding study, extensive metallic hardware artifacts noted. Impression Impression: Moderate quantity of intraperitoneal free air. Findings are consistent with 
perforated viscus. The site of perforation is uncertain, as discussed above, 
possibly the sigmoid colon where there is apparent small subtle focal 
hypoattenuation along the wall as detailed above (reference axial images 111-113) close to region where several foci of free air are grouped. The left 
hemicolon is mildly distended and filled with hyperdense appearing stool. Mild diffuse mural thickening and enhancement of the small bowel as described. Small volume of ascites best seen around the liver and in the pelvis adjacent to 
the sigmoid. Bibasilar consolidations, right greater than left, suspicious for airspace 
disease/pneumonia. Indeterminate attenuation bilateral renal masses, follow-up evaluation 
recommended. Splenic hypodensities again seen as described on recent chest CT. Intubated. Severe pulmonary emphysematous disease and fibrotic changes. Multiple otherwise nonacute findings as detailed above. I have discussed these results directly with Dr. Philly Harris in the ED at 12:20 p.m. 
in addition to the patient's consulted surgeon at 12:25 PM. IMPRESSION:  
· Acute on Chronic Respiratory Failure - Emergently intubated in ED for airway protection-liberated from ventilator and extubated 3/8/2020 . chronically on 3L NC at home. · Acute Sigmoid Colon Perforation w/ peritonitis - s/p emergent ex-lap w/ drainage of intraabdominal stool and collections; sigmoid colectomy with Dr. Heather Jennings on 02/29/20. Subsequent washout and abdominal closure on 03/01/20.  · Staph bacteremia - Grew on BCx collected 3/3. GNR bacteremia on 2/29, no regrowth · Hypokalemia - Likely 2/2 diuresis. · Hypophosphatemia- ?refeeding syndrome- resolved · Metabolic alkalosis ?diuresis / GI loss · Intermittent Tachyarrhythmia - afibb/flutter - improved · Septic Shock due to peritonitis, UTI · Bilateral PNA - Sputum Cx collected 3/1 grew Moraxella catarrhalis. · Troponemia - demand ischemia, not an MI.  
· Hypothyroidism · ELIF - Likely pre-renal, 2/2 above. Initial ELIF resolved. Pt w/ mild increase in BUN/Cr w/ diuresis. · UTI - UCx grew E.coli.  
· T2DM  
· Paraparesis, in wheelchair: Recent Hx of Spine Thoracic Laminectomy T6-T11 with Fusion (12/11/20) with Dr. Geraldine Dixon 2/2 Acute compression fx of T9-T10 and spinal cord compression at T9 and T10 and spinal cord edema with subsequent paraparesis. · Hx of Sarcoidosis - Chronic steroid use. · Hx of COPD, on chronic 3L NC, FEV1 51% in 2012, unable to perform repeat studies since  - Follows with Dr. Manolo Spain in clinic. · Hx of Cocaine and Heroin Abuse - on methadone. Patient Active Problem List  
Diagnosis Code  Diabetic neuropathy associated with type 2 diabetes mellitus (HCC) E11.40  Venous insufficiency I87.2  Obesity, Class I, BMI 30-34.9 E66.9  Chronic back pain M54.9, G89.29  
 Generalized osteoarthritis of multiple sites M15.9  Bipolar affective disorder ([address] F31.9  Abnormally low high density lipoprotein (HDL) cholesterol with hypertriglyceridemia E78.6, E78.1  Diastolic dysfunction without heart failure I51.89  Chronic obstructive pulmonary disease (COPD) ([address] J44.9  Hypertensive heart disease without heart failure I11.9  Depression F32.9  History of back injury Z87.828  Type 2 diabetes mellitus with diabetic neuropathy, with long-term current use of insulin ([facility]) E11.40, Z79.4  Anxiety F41.9  Wears glasses Z97.3  History of hepatitis C Z86.19  
 Sickle cell trait ([facility]) D57.3  History of acute renal failure Z87.448  Hypothyroidism E03.9  Recurrent genital herpes A60.00  Sarcoidosis D86.9  Abscess of right arm L02.413  Hypoxemia requiring supplemental oxygen R09.02, Z99.81  
 Abnormal nuclear stress test R94.39  
 Cellulitis and abscess of hand L03.119, L02.519  
 Cellulitis and abscess of foot L03.119, L02.619  
 Cellulitis of arm, right L03. 113  
 Olecranon bursitis of right elbow M70.21  
 Contusion of left elbow S50. 02XA  Intravenous drug user F19.90  Right Achilles tendinitis M76.61  
 History of penicillin allergy Z88.0  Falls frequently R29.6  Gastroesophageal reflux disease with hiatal hernia K21.9, K44.9  Memory difficulty R41.3  Mixed connective tissue disease ([address] M35.1  Impaired mobility and ADLs Z74.09  
 Hyperuricemia E79.0  History of vitamin D deficiency Z86.39  
 Urge urinary incontinence N39.41  Spinal cord compression ([facility]) G95.20  Compression fracture of body of thoracic vertebra ([facility]) S22.000A  Status post laminectomy with spinal fusion Z98.1  Acute blood loss as cause of postoperative anemia D62  
 History of fracture due to fall Z87.81  Chronic respiratory failure with hypoxia ([facility]) J96.11  
 Cellulitis of right forearm L03. 113  
 Gout M10.9  Menopause Z78.0  Long term current use of aspirin Z79.82  
 Opioid dependence ([facility]) F11.20  Tobacco use disorder F17.200  Sepsis ([address] A41.9  Perforated viscus R19.8  Septic shock ([address] A41.9, R65.21  
 
  
RECOMMENDATIONS:  
 
Pulm: continue O2 support. Ordered CXR, appears unchanged. Continue duo nebs, maintenance prednisone dose 30 mg daily ID-continue Vanco and Zosyn for peritonitis, per ID Surgical-continue postop wound care, ostomy care, wound VAC Nutrition-advance as tolerated. Dietary service following Electrolytes-monitor and replace Will need OT PT, speech therapy care management for next level of care Stress ulcer and DVT prophylaxis High complexity decision making was performed during this consultation and evaluation. [x]       Pt is at high risk for further organ failure and dysfunction. Ken Mullen MD 
03/12/20 Pulmonary, Critical Care Medicine [facility]

## 2020-03-12 NOTE — PROGRESS NOTES
NUTRITION Nutrition Consult: General Nutrition Management & Supplements RECOMMENDATIONS / PLAN:  
 
- Recommend NGT placement to provide supplemental tube feeding.  
- Monitor and replace electrolytes as needed, recommend replacing calcium, magnesium and potassium.  
- Continue oral diet as tolerated per SLP with nutritional supplements: Magic Cup TID, discontinue Ensure Pudding TID.  
- Continue RD inpatient monitoring and evaluation. NUTRITION INTERVENTIONS & DIAGNOSIS:  
 
- Meals/snacks: modified composition, monitor po intake - Medical food supplement therapy: Magic Cup TID, discontinue Ensure Pudding TID 
- Vitamin and mineral supplement therapy: multivitamin, neutra-phos 4 packets daily; replacement recommended - Collaboration and referral of nutrition care: patient discussed with nursing Nutrition Diagnosis: Inadequate oral intake related to decreased appetite, weakness, altered GI function as evidenced by pt consuming 50% or less of recent meals and requires assistance eating. Altered GI function related to altered GI function and structure, bowel regimen and laxative administration as evidenced by loose stool per rectum requiring FMS placement, also with loose output from colostomy. ASSESSMENT:  
 
3/12: Appetite and meal intake remain poor, c/o mouth and stomach pain, denies nausea/vomiting and ate only jello today. Does not like the Ensure pudding. Downgraded to full liquid diet by SLP this morning. Increased output from FMS- replaced overnight, also with increased output from wound VAC and minimal stool out colostomy. Recent BG 85, 88, 94 mg/dL and lantus held by nursing this morning.  
 
3/11: Patient with poor appetite and meal intake, c/o mouth pain from sores on tongue causing pain when eating and drinking but improved some today after treatment with lubrication and started on nystatin.  No output from FMS but now increased stool from colostomy with continued hypokalemia. Likes supplements, able to consume 1/3 of magic cup at dinner yesterday per family at bedside. Hypoglycemia with tube feeding stopped and steroids decreased and lantus dose to decrease per discussion with MD.  
3/10: Tolerating bolus feeds, hypernatremia improving and replacement given for K of 2.5 today. Requires assistance eating, consuming 30-50% of recent meals and Surgery recommending NGT removal as soon as possible- will remove and discontinue supplemental tube feeding despite inadequate meal intake per discussion with MD. Loose stool per rectum and FMS placed overnight in addition to colostomy output and 3.4 L UOP total yesterday after diuresis- contributing to hypokalemia with low normal magnesium- to be replaced to assist with K repletion. 3/9: Extubated 3/7, NGT keep in place post extubation and tube feeding at goal. Diet started after swallow evaluation this morning, pt lethargic with anticipated poor meal intake- will continue feeds, change to bolus regimen and monitor adequacy of meal intake per MD.  
3/7: Tolerating feeds at 30 mL/hr, residual  mL. Receiving mout etoclopramide & miralax held this morning after large loose stool filled colostomy bag. 
3/6: Feeds advanced to 20 mL/hr then rate dropped back down to 10 mL/hr after 200 mL residual. Pt with chronic constipation, hx of narcotic use, partial paralysis and prolonged ileus anticipated per MD. Lantus increased for hyperglycemia, hypernatremia improving. 3/5: Tolerating trickle feeds, hypernatremia noted. 3/4: Minimal out from colostomy with 1100 mL out from NGT- will start trial of trickle feeding and monitor. 3/3: Remains intubated, no nausea/vomiting, no ostomy function.  No output documented from NGT.  
3/2: S/p ex lap, descending colectomy and creation of colostomy on 3/1 then re-ex lap, drainage of intra-abdominal stool collection, descending colectomy with mobilization of the splenic flexure and transverse colostomy. Remains intubated on the vent postop. Nutritional intake adequate to meet patients estimated nutritional needs:  No  
 
Diet: DIET NUTRITIONAL SUPPLEMENTS Breakfast, Lunch, Dinner; [employer] CUPS 
DIET NUTRITIONAL SUPPLEMENTS Breakfast, Lunch, Dinner; ENSURE PUDDING 
DIET FULL LIQUID Food Allergies: NKFA Current Appetite: Poor Appetite/meal intake prior to admission: Fair, consuming soft foods; good meal intake PTA per family report Feeding Limitations:  [x] Swallowing difficulty: SLP following    [] Chewing difficulty    [x] Other: weakness, mouth pain Current Meal Intake:  
Patient Vitals for the past 100 hrs: 
 % Diet Eaten 03/11/20 1855 0 % 03/11/20 1300 10 % 03/11/20 0852 20 % 03/10/20 1725 10 % 03/10/20 1300 10 % 03/10/20 0900 50 % BM: 3/12, colostomy (40 mL output x past 24 hours and 750 mL output from FMS) Skin Integrity: abdominal surgical incision with wound VAC (600 mL output) Edema:   [] No     [x] Yes Pertinent Medications: Reviewed: SSI, steroid, 10 units lantus (held 3/12), pantoprazole, levothyroxine po, fentanyl Recent Labs  
  03/12/20 
0510 03/11/20 2015 03/11/20 
0400 03/10/20 
7208  143 147* 147* K 3.0* 3.3* 2.6* 2.5*  
 108 110 108 CO2 29 30 32 34* GLU 85 104* 72* 98 BUN 8 8 9 8 CREA 0.46* 0.40* 0.55* 0.56* CA 7.6* 7.2* 7.8* 8.1*  
MG 1.7  --  2.2 1.6 PHOS 2.8  --  2.1* 2.7 ALB 1.4*  --  1.5* 1.5* SGOT 19  --  18 21 ALT 15  --  17 17 Intake/Output Summary (Last 24 hours) at 3/12/2020 1356 Last data filed at 3/12/2020 8610 Gross per 24 hour Intake 1470 ml Output 1570 ml Net -100 ml Anthropometrics: 
Ht Readings from Last 1 Encounters:  
03/03/20 5' 4\" (1.626 m) Last 3 Recorded Weights in this Encounter 03/09/20 0636 03/11/20 1305 03/12/20 0135 Weight: 88 kg (194 lb 0.1 oz) 93.2 kg (205 lb 7.5 oz) 93.8 kg (206 lb 11.2 oz) Body mass index is 35.48 kg/m².   Obese, Class II  
 
 Weight History: previously reported intentional weight loss and previous usual weight of 230 lb; patient reports recent usual weight of 175 lb prior to admission Weight Metrics 3/12/2020 2/28/2020 1/22/2020 1/17/2020 1/2/2020 12/16/2019 12/14/2019 Weight 206 lb 11.2 oz - 171 lb 11.2 oz - 172 lb 6.4 oz - 178 lb 9.6 oz BMI 35.48 kg/m2 28.57 kg/m2 - 28.57 kg/m2 - 28.69 kg/m2 - Some encounter information is confidential and restricted. Go to Review Flowsheets activity to see all data. Admitting Diagnosis: Septic shock ([facility] [A41.9, R65.21] Perforated viscus [R19.8] Pertinent PMHx: COPD, HTN, DM, hepatitis C, sarcoidosis, methadone use, GERD with hiatal hernia, hypothyroidism Procedures: Spine Thoracic Laminectomy T6-T11 with Fusion (12/11/20) 2/2 compression fx of T9-T10 and spinal cord compression at T9 and T10 and spinal cord edema with subsequent paraparesis Education Needs:        [x] None identified  [] Identified - Not appropriate at this time  []  Identified and addressed - refer to education log Learning Limitations:   [x] None identified  [] Identified:  
Cultural, Mandaen & ethnic food preferences:  [x] None identified    [] Identified and addressed ESTIMATED NUTRITION NEEDS:  
 
Calories: 8860-0796 kcal (MSJx1.2-1.5) based on  [x] Actual BW 88 kg     [] IBW Protein:  gm (1-1.5 gm/kg) based on  [x] Actual BW      [] IBW Fluid: 1 mL/kcal 
  
MONITORING & EVALUATION:  
  
Nutrition Goal(s):  
- PO nutrition intake will meet >75% of patient estimated nutritional needs within the next 7 days. Outcome: Progressing towards goal   
- Improvement in transit time, patient will have 200-600 mL/d colostomy output within the next 7 days. Outcome: Progressing towards goal   
- Patient blood glucose levels will maintain WNL of  mg/dL within the next 5-7 days.   Outcome: New/Initial goal      
 
Monitoring:   [x] Food and nutrient intake   [x] Food and nutrient administration  [x] Comparative standards   [x] Nutrition-focused physical findings   [x] Anthropometric Measurements   [x] Treatment/therapy   [x] Biochemical data, medical tests, and procedures Previous Recommendations (for follow-up assessments only): Implemented Discharge Planning: Nutritional discharge needs unknown at this time. Participated in care planning, discharge planning, & interdisciplinary rounds as appropriate. Karol Zaman RD, [facility] Pager: [phone]

## 2020-03-12 NOTE — ROUTINE PROCESS
110 Assumed care of patient from [facility] (ICU RN). Patient resting in bed with no distress. Bed in lowest position, side rails up x3, call bell and personal belongings within reach. Will continue to monitor. 4735 Bedside shift change report given to Sony Portillo (oncoming nurse) by Gurpreet Mills RN (offgoing nurse). Report included the following information SBAR, Kardex, Intake/Output, MAR, Recent Results and Cardiac Rhythm NSR on tele #7.

## 2020-03-12 NOTE — PROGRESS NOTES
Problem: Dysphagia (Adult) Goal: *Acute Goals and Plan of Care (Insert Text) Description: Patient will: 1. Tolerate PO trials with 0 s/s overt distress in 4/5 trials 2. Utilize compensatory swallow strategies/maneuvers (decrease bite/sip, size/rate, alt. liq/sol) with min cues in 4/5 trials 3. Perform oral-motor/laryngeal exercises to increase oropharyngeal swallow function with min cues 4. Complete an objective swallow study (i.e., MBSS) to assess swallow integrity, r/o aspiration, and determine of safest LRD, min A as indicated/ordered by MD  
 
Recommend:  
Full liquid diet Meds per pt preference Aspiration precautions HOB >45 degrees during all intake and for at least 30 min after po Small bites/sips, slow rate of intake, alternating bites/sips Oral care three times daily Outcome: Progressing Towards Goal 
 
SPEECH LANGUAGE PATHOLOGY DYSPHAGIA TREATMENT Patient: Margret Frazier (65 y.o. female) Date: 3/12/2020 Diagnosis: Septic shock ([facility] [address] [A41.9, R65.21] Perforated viscus [R19.8] <principal problem not specified> Procedure(s) (LRB): 
LAPAROTOMY EXPLORATORY, DESCENDING COLECTOMY, CREATION OF COLOSTOMY (N/A) 11 Days Post-Op Precautions: Aspiration, Fall(Spinal sx (12/2019)) PLOF: Regular diet with thin liquids ASSESSMENT: 
Pt seen for follow up dysphagia tx with son and RN present during most of session. Pt reporting \"burning in mouth\" with all intake. Prescribed Nystatin for thrush but per pt/RN report, has been refusing due to burning. SLP, son and RN educated pt regarding importance of taking medication to reduce burning sensation and promote increased intake. Pt demo improved tolerance today of thin liquids + straw via single sips and consecutive swallows with no overt s/sx aspiration noted across multiple trials.   Pt demo chomping motion with mech soft and puree with no a-p transit and >50% of bolus remaining on anterior portion of tongue requiring mod cues for x4-5 liquid washes to clear. Demo improved tolerance of pudding and jello. Recommend change diet to full liquids with use of the above mentioned compensatory strategies/aspiration precautions. Educated pt and son regarding diet recs, comp strategies, aspiration risk/precautions and positioning with understanding verbalized. Will continue to follow for further dysphagia management. Progression toward goals: 
[x]         Improving appropriately and progressing toward goals 
[]         Improving slowly and progressing toward goals 
[]         Not making progress toward goals and plan of care will be adjusted PLAN: 
Recommendations and Planned Interventions: 
Patient continues to benefit from skilled intervention to address the above impairments. Continue treatment per established plan of care. Discharge Recommendations: To Be Determined SUBJECTIVE:  
Patient stated It burns. I can't do it. OBJECTIVE:  
Cognitive and Communication Status: 
Neurologic State: Alert Orientation Level: Oriented X4 Cognition: Follows commands Perception: Appears intact Perseveration: No perseveration noted Safety/Judgement: Fall prevention Dysphagia Treatment: 
Oral Assessment: 
Oral Assessment Labial: No impairment Dentition: Natural, Limited Oral Hygiene: Poor Lingual: Decreased strength, Decreased rate Velum: Unable to visualize Mandible: No impairment P.O. Trials: 
 Patient Position: 45 at [address] [facility],[address] Vocal quality prior to P.O.: No impairment Consistency Presented: Thin liquid, Mixed consistency, Puree, Pill/Tablet, Pudding How Presented: SLP-fed/presented, Self-fed/presented, Cup/sip, Spoon, Straw, Successive swallows Bolus Acceptance: No impairment Bolus Formation/Control: Impaired Type of Impairment: Delayed, Incomplete, Poor, Posterior, Mastication Propulsion: Delayed (# of seconds), Discoordination, Absent(Absent with [facility] and puree ) Oral Residue: Greater than 50% of bolus(With mech soft and puree ) Initiation of Swallow: No impairment Laryngeal Elevation: Functional 
 Aspiration Signs/Symptoms: None Pharyngeal Phase Characteristics: Poor endurance, Easily fatigued Effective Modifications: None Cues for Modifications: Moderate Oral Phase Severity: Moderate Pharyngeal Phase Severity : Mild PAIN: 
Start of Tx: not reported End of Tx: not reported After treatment:  
[]              Patient left in no apparent distress sitting up in chair 
[x]              Patient left in no apparent distress in bed 
[x]              Call bell left within reach [x]              Nursing notified 
[]              Family present 
[]              Caregiver present 
[]              Bed alarm activated COMMUNICATION/EDUCATION:  
[x] Aspiration precautions; swallow safety; compensatory techniques [x]        Patient/family able to participate in training and education  
[]  Patient unable to participate in training and education, education ongoing with staff  
[] Patient understands goals and intent of therapy; neutral about participation Bunny Gold M.S., CCC-SLP Speech-Language Pathologist

## 2020-03-13 NOTE — PROGRESS NOTES
RESPIRATORY MEDICATION PROTOCOL ASSESSMENT Patient  Helena Sinha     62 y.o.   female     3/13/2020  7:52 PM 
 
Breath Sounds Pre Procedure:  Breath Sounds Bilateral: Diminished Breath Sounds Post Procedure: Breath Sounds Bilateral: Coarse Breathing pattern: Pre procedure  Breathing Pattern: Regular Post procedure  Breathing Pattern: Regular Cough: Pre procedure  Cough: Non-productive Post procedure Cough: Non-productive Heart Rate: Pre procedure Pulse: 76 Post procedure Pulse: 79 Resp Rate: Pre procedure  Respirations: 16 Post procedure Nebulizer Therapy: Current medications Aerosolized Medications: Pulmicort Problem List:  
Patient Active Problem List  
Diagnosis Code  Diabetic neuropathy associated with type 2 diabetes mellitus (HCC) E11.40  Venous insufficiency I87.2  Obesity, Class I, BMI 30-34.9 E66.9  Chronic back pain M54.9, G89.29  
 Generalized osteoarthritis of multiple sites M15.9  [facility] F31.9  Abnormally low high density lipoprotein (HDL) cholesterol with hypertriglyceridemia E78.6, E78.1  Diastolic dysfunction without heart failure I51.89  Chronic obstructive pulmonary disease (COPD) ([facility] J44.9  Hypertensive heart disease without heart failure I11.9  Depression F32.9  History of back injury Z87.828  Type 2 diabetes mellitus with diabetic neuropathy, with long-term current use of insulin ([facility]) E11.40, Z79.4  Anxiety F41.9  Wears glasses Z97.3  History of hepatitis C Z86.19  
 Sickle cell trait (HCC) D57.3  History of acute renal failure Z87.448  Hypothyroidism E03.9  Recurrent genital herpes A60.00  Sarcoidosis D86.9  Abscess of right arm L02.413  Hypoxemia requiring supplemental oxygen R09.02, Z99.81  
 Abnormal nuclear stress test R94.39  
  Cellulitis and abscess of hand L03.119, L02.519  
 Cellulitis and abscess of foot L03.119, L02.619  
 Cellulitis of arm, right L03. 113  
 Olecranon bursitis of right elbow M70.21  
 Contusion of left elbow S50. 02XA  Intravenous drug user F19.90  Right Achilles tendinitis M76.61  
 History of penicillin allergy Z88.0  Falls frequently R29.6  Gastroesophageal reflux disease with hiatal hernia K21.9, K44.9  Memory difficulty R41.3  Mixed connective tissue disease ([facility] [facility] M35.1  Impaired mobility and ADLs Z74.09  
 Hyperuricemia E79.0  History of vitamin D deficiency Z86.39  
 Urge urinary incontinence N39.41  Spinal cord compression ([facility]) G95.20  Compression fracture of body of thoracic vertebra ([facility]) S22.000A  Status post laminectomy with spinal fusion Z98.1  Acute blood loss as cause of postoperative anemia D62  
 History of fracture due to fall Z87.81  Chronic respiratory failure with hypoxia ([facility]) J96.11  
 Cellulitis of right forearm L03. 113  
 Gout M10.9  Menopause Z78.0  Long term current use of aspirin Z79.82  
 Opioid dependence ([facility]) F11.20  Tobacco use disorder F17.200  Sepsis [facility] A41.9  Perforated viscus R19.8  Septic shock ([facility]) A41.9, R65.21 Patient alert and cooperative to use MDI: Yes 
 
Home Respiratory Therapy Regimen/Frequency:  YES Medication Duoneb Pulmicort Device HHN Frequency BID SEVERITY INDEX: 
 
ITEM 0 1 2 3 4 Score Respiratory Pattern and or Rate Regular 10-19 Regular 20-24  
24-30   
30-34 Severe SOB or  
Greater than 35 0 Breath Sounds Clear Occasional Wheeze Mild Wheezing Moderate Wheezing  wheezing/Absent breath sounds 0 Shortness of Breath None Dyspnea on Exertion Dyspnea at Rest Moderate Shortness of Breath at Rest Severe Shortness of Breath - Limited Speech 1 Total Score:  1 
 
* Scoring Guidelines 0-4 pts:  PRN-BID  
5-7 pts:  BID, TID, QID 
8-9 pts:  TID, QID, Q6 
 10-12 pts:  Q4-Q6 * - Guidelines used with clinical judgement. PRN Treatments can be ordered to supplement scheduled treatments. Regardless of score, frequency should not be less than normal home regimen. Recommended Order/Frequency:  BID Comments:   
 
 
 
Respiratory Therapist: Earley Epley, RT

## 2020-03-13 NOTE — PROGRESS NOTES
Cardiology progress note CARDIOLOGY PROGRESS NOTE 
RECS: 
 
 
 
1. Paroxymal Atrial flutter/atrial fibrillation  with RVR- maintaining NSR- continue  amiodarone po. On Eliquis 5 mg bid. 2. Anemia- H&H slightly dropping Will monitor. 3. Acute respiratory Failure- intubated. Extubated 3/7/20 - on O2 by Nasal canula 4. Acute Sigmoid Colon Perforation - s/p Emergent Ex-Lap with drainage of intraabdominal stool and collections; sigmoid colectomy with Dr. Aissatou Mgcinnis on 02/29/20.sigmoid perforation, secondary peritonitis  S/p Re-exploratory laparotomy, drainage of intra-abdominal stool collection, descending colectomy with mobilization of the splenic flexure, and transverse colostomy on 3/1. 
5. Septic Shock-improved  on antibiotics managed by [facility] and ID  
6. Hypotension- in the setting of #3 resolved 7. Acute on chronic diastolic heart failure-  Mildly decompensated. Added lasix po.  
8. Hx sarcoidosis  
9. Pulmonary hypertension with PAP 80 mmHg on recent echo  
10. Abnormal TSH- medications per [facility] care 11. Hypokalemia- persistent- on neutra phos and K+ iv per medical team added aldactone  25 mg po daily. Follow BMP Edema is improving. Aldactone has been started. Diarrhea is improving well. Follow-up electrolytes. If blood pressure remains good, may use Lasix as tolerated as potassium increases. 
 
       
02/29/20 ECHO ADULT FOLLOW-UP OR LIMITED 03/03/2020 3/3/2020  
  Narrative · Dilated right ventricle. Reduced systolic function. Abnormal right  
ventricular septal motion. Systolic flattening of interventricular septum  
consistent with right ventricle pressure overload. · Moderate tricuspid valve regurgitation is present. · Severely elevated central venous pressure (15+ mmHg); IVC diameter is  
larger than 21 mm and collapses less than 50% with respiration. · Severe pulmonary hypertension. · Pulmonary arterial systolic pressure is 85.5 mmHg · Normal cavity size, wall thickness and systolic function (ejection  
fraction normal). Estimated left ventricular ejection fraction is 55 -  
60%. · Dilated right atrium. 
   
    Signed by: Georges Moyer MD  
 
 
 
ASSESSMENT: 
Hospital Problems  Date Reviewed: 2/28/2020 Codes Class Noted POA Perforated viscus ICD-10-CM: R19.8 ICD-9-CM: 799.89  2/29/2020 Unknown Septic shock (HCC) ICD-10-CM: A41.9, R65.21 ICD-9-CM: 038.9, 785.52, 995.92  2/29/2020 Unknown SUBJECTIVE: 
 
Patient alert. No c/o dyspnea. Feeling hungry and wants regular food diarrhea is improving well. Diarrhea has improved OBJECTIVE: 
 
VS:  
Visit Vitals /76 (BP 1 Location: Left arm, BP Patient Position: At rest) Pulse 66 Temp 98.8 °F (37.1 °C) Resp 18 Ht 5' 4\" (1.626 m) Wt 91.6 kg (201 lb 14.4 oz) SpO2 97% BMI 34.66 kg/m² Intake/Output Summary (Last 24 hours) at 3/13/2020 1100 Last data filed at 3/13/2020 7625 Gross per 24 hour Intake 750 ml Output 1300 ml Net -550 ml  
 
TELE: normal sinus rhythm General: No acute distress. More alert today HENT: Normocephalic, atraumatic. Neck :  Supple Cardiac:  Normal S1/S2 Chest/Lungs: harsh breath sound left lower lung. RL lobe diminished. Wheezes lower lung Abdomen: Soft, distended non tender Extremities: Bilateral UP edema pedal edema noted. Labs: Results:  
   
Chemistry Recent Labs  
  03/12/20 
0510 03/11/20 2015 03/11/20 
0400 GLU 85 104* 72*  143 147* K 3.0* 3.3* 2.6*  
 108 110 CO2 29 30 32 BUN 8 8 9 CREA 0.46* 0.40* 0.55* CA 7.6* 7.2* 7.8*  
MG 1.7  --  2.2 PHOS 2.8  --  2.1* AGAP 7 5 5 BUCR 17 20 16 AP 46  --  50  
TP 5.4*  --  5.9* ALB 1.4*  --  1.5*  
GLOB 4.0  --  4.4* AGRAT 0.4*  --  0.3* CBC w/Diff Recent Labs  
  03/12/20 
0510 03/11/20 
0400 WBC 7.5 7.9  
RBC 2.77* 3.19* HGB 7.5* 8.5* HCT 23.2* 27.1*  
 316 GRANS 66 51 LYMPH 17* 23 EOS 0 3 Cardiac Enzymes No results for input(s): CPK, CKND1, SANTOS in the last 72 hours. No lab exists for component: Stevie Corrigan Coagulation No results for input(s): PTP, INR, APTT, INREXT, INREXT in the last 72 hours. Lipid Panel Lab Results Component Value Date/Time Cholesterol, total 141 09/30/2019 12:00 AM  
 HDL Cholesterol 39 (L) 09/30/2019 12:00 AM  
 LDL, calculated 61 09/30/2019 12:00 AM  
 VLDL, calculated 41 (H) 09/30/2019 12:00 AM  
 Triglyceride 204 (H) 09/30/2019 12:00 AM  
 CHOL/HDL Ratio 3.5 11/06/2016 04:18 AM  
  
BNP No results for input(s): BNPP in the last 72 hours. Liver Enzymes Recent Labs  
  03/12/20 
0510 TP 5.4* ALB 1.4* AP 46 SGOT 19  
  
Digoxin Thyroid Studies Lab Results Component Value Date/Time TSH 5.11 (H) 03/05/2020 06:25 PM  
    
 
 
 
Suma E Halecki, NP -C I have independently evaluated and examined the patient. All relevant labs and testing data are reviewed. Care plan discussed and updated after review.  
Rose Lal MD

## 2020-03-13 NOTE — PROGRESS NOTES
[facility] Hospitalist Group Progress Note Patient: Alicia Laughlin Age: 62 y.o. : 1955 MR#: [id number] SSN: [id number] Date/Time: 3/13/2020 Subjective:  
 
Patient is complaining of some improvement in her oral cavity pain but still with burning No chest pain no shortness of breath no nausea vomiting diarrhea Assessment/Plan:  
Patient with sigmoid colonic perforation, secondary to constipation with large amount of stool burden spilling into peritoneal, peritonitis, also present Prevotella bloodstream infection now status post ex lap with resection Also E. coli cystitis, acute on chronic respiratory failure. 1.  Sepsis-shock 2 acute hypoxic respiratory failure secondary to sepsis 3 cystitis E. coli present on admission 4 status post exploratory laparotomy-recovering well surgery signed off 
5 bacteremia-followed by ID  
6 anemia due to chronic illness 7 hypokalemia-on replacement 8 hypoalbuminemia-patient was on TPN with supplements now able to eat p.o. discussed with nutrition 9 Bilateral PNA - Sputum Cx collected 3/1 grew Moraxella catarrhalis 10 Oropharyngeal candida - on Nystatin 11  Bilateral paraparesis - H/o spinal cord compression Plan: 
 
-Continue oral hygiene, add Diflucan p.o., follow-up oral thrush treatment 
-Improving sepsis 
-Continue Zosyn per ID 
-Monitor for any development of intra-abdominal abscess due to contamination due to stool seepage into the peritoneum 
-Drop in H&H noted continue to monitor H&H 
-Hypokalemia continue p.o. replacement- 
Case discussed with:  [x]Patient  [] Family ( In room with patient )    []Nursing  []Case Management DVT Prophylaxis:  []Lovenox  []Hep SQ  []SCDs  []Coumadin   []On Heparin gtt Objective:  
VS:  
Visit Vitals /68 (BP 1 Location: Left arm, BP Patient Position: At rest) Pulse 68 Temp 98.5 °F (36.9 °C) Resp 18 Ht 5' 4\" (1.626 m) Wt 91.6 kg (201 lb 14.4 oz) LMP 2012 (Exact Date) SpO2 99% BMI 34.66 kg/m² Tmax/24hrs: Temp (24hrs), Av.3 °F (36.8 °C), Min:97.6 °F (36.4 °C), Max:98.8 °F (37.1 °C) IOBRIEF Intake/Output Summary (Last 24 hours) at 3/13/2020 1209 Last data filed at 3/13/2020 6937 Gross per 24 hour Intake 750 ml Output 2000 ml Net -1250 ml General: No distress noted Oropharyngeal mucosa wet and dirty -likely fungal Candida infection Cardiovascular: S1S2 - regular , No Murmur Pulmonary: Equal expansion , No Use of accessory muscles , No Rales No Rhonchi GI:  +BS in all four quadrants, soft, non-tender Extremities:  No edema; 2+ dorsalis pedis pulses bilaterally Neuro: not examined Medications:  
Current Facility-Administered Medications Medication Dose Route Frequency  albuterol-ipratropium (DUO-NEB) 2.5 MG-0.5 MG/3 ML  3 mL Nebulization QID RT  
 potassium chloride (K-DUR, KLOR-CON) SR tablet 40 mEq  40 mEq Oral TID  oxyCODONE-acetaminophen (PERCOCET) 5-325 mg per tablet 1 Tab  1 Tab Oral Q6H PRN  
 acetaminophen (TYLENOL) tablet 325 mg  325 mg Oral Q6H PRN  
 spironolactone (ALDACTONE) tablet 25 mg  25 mg Oral DAILY  furosemide (LASIX) tablet 20 mg  20 mg Oral DAILY  multivit-folic acid-herbal 303 (WELLESSE PLUS) oral liquid 30 mL  30 mL Oral DAILY  predniSONE (DELTASONE) tablet 30 mg  30 mg Oral DAILY WITH BREAKFAST  insulin lispro (HUMALOG) injection   SubCUTAneous AC&HS  pantoprazole (PROTONIX) tablet 40 mg  40 mg Oral ACB&D  
 nystatin (MYCOSTATIN) 100,000 unit/mL oral suspension 500,000 Units  500,000 Units Oral QID  potassium, sodium phosphates (NEUTRA-PHOS) packet 1 Packet  1 Packet Oral QID  insulin glargine (LANTUS) injection 10 Units  10 Units SubCUTAneous DAILY  levothyroxine (SYNTHROID) tablet 100 mcg  100 mcg Oral 6am  
 apixaban (ELIQUIS) tablet 5 mg  5 mg Oral BID  
 amiodarone (CORDARONE) tablet 200 mg  200 mg Oral BID  
  piperacillin-tazobactam (ZOSYN) 4.5 g in 0.9% sodium chloride (MBP/ADV) 100 mL MBP  4.5 g IntraVENous Q6H  
 acetaminophen (TYLENOL) solution 650 mg  650 mg Per NG tube Q4H PRN  
 diphenhydrAMINE (BENADRYL) injection 25 mg  25 mg IntraVENous Q4H PRN  
 sodium chloride (NS) flush 5-10 mL  5-10 mL IntraVENous PRN  
 sodium chloride (NS) flush 5-40 mL  5-40 mL IntraVENous PRN  
 albuterol (ACCUNEB) nebulizer solution 1.25 mg  1.25 mg Nebulization Q6H PRN  
 budesonide (PULMICORT) 500 mcg/2 ml nebulizer suspension  500 mcg Nebulization BID RT  
 glucose chewable tablet 16 g  4 Tab Oral PRN  
 glucagon (GLUCAGEN) injection 1 mg  1 mg IntraMUSCular PRN  
 dextrose (D50W) injection syrg 12.5-25 g  25-50 mL IntraVENous PRN Labs:   
Recent Labs  
  03/12/20 
0510 03/11/20 
0400 WBC 7.5 7.9 HGB 7.5* 8.5* HCT 23.2* 27.1*  
 316 Recent Labs  
  03/12/20 
0510 03/11/20 2015 03/11/20 
0400  143 147* K 3.0* 3.3* 2.6*  
 108 110 CO2 29 30 32 GLU 85 104* 72*  
BUN 8 8 9 CREA 0.46* 0.40* 0.55* CA 7.6* 7.2* 7.8*  
MG 1.7  --  2.2 PHOS 2.8  --  2.1* ALB 1.4*  --  1.5* SGOT 19  --  18 ALT 15  --  17 Signed By: Gris Serrano MD   
 March 13, 2020

## 2020-03-13 NOTE — PROGRESS NOTES
NUTRITION Nutrition Consult: General Nutrition Management & Supplements RECOMMENDATIONS / PLAN:  
 
- Change supplements to Ensure Enlive TID. - Monitor po intake and diet tolerance. - Monitor and replace electrolytes as needed- check BMP, Mg, Phos and ionized calcium. 
- Continue RD inpatient monitoring and evaluation. NUTRITION INTERVENTIONS & DIAGNOSIS:  
 
- Meals/snacks: modified composition - Medical food supplement therapy: Magic Cup TID- modify - Vitamin and mineral supplement therapy: multivitamin, neutra-phos 4 packets daily, 40 mEq KCl po x 3 (30 mEq KCl given overnight) - Collaboration and referral of nutrition care: discussed electrolytes, ordered replacement with no labs drawn today with Dr Anamika Mcmullen MD to address Nutrition Diagnosis: Inadequate oral intake related to decreased appetite, weakness, altered GI function as evidenced by pt consuming 50% or less of recent meals and requires assistance eating. Altered GI function related to altered GI function and structure, bowel regimen and laxative administration as evidenced by loose stool per rectum requiring FMS placement, also with loose output from colostomy. ASSESSMENT:  
 
3/13: Patient reports appetite/meal intake improved some today, ate 50% of breakfast and mouth pain has improved, c/o burning with magic cup supplements but agreeable to trying different supplements and encouraged po intake. C/o continued abdominal pain, denies nausea/vomiting. Pt states she does not want NGT placed. 3/12: Appetite and meal intake remain poor, c/o mouth and stomach pain, denies nausea/vomiting and ate only jello today. Does not like the Ensure pudding. Downgraded to full liquid diet by SLP this morning. Increased output from FMS- replaced overnight, also with increased output from wound VAC and minimal stool out colostomy. Recent BG 85, 88, 94 mg/dL and lantus held by nursing this morning. 3/11: Patient with poor appetite and meal intake, c/o mouth pain from sores on tongue causing pain when eating and drinking but improved some today after treatment with lubrication and started on nystatin. No output from FMS but now increased stool from colostomy with continued hypokalemia. Likes supplements, able to consume 1/3 of magic cup at dinner yesterday per family at bedside. Hypoglycemia with tube feeding stopped and steroids decreased and lantus dose to decrease per discussion with MD.  
3/10: Tolerating bolus feeds, hypernatremia improving and replacement given for K of 2.5 today. Requires assistance eating, consuming 30-50% of recent meals and Surgery recommending NGT removal as soon as possible- will remove and discontinue supplemental tube feeding despite inadequate meal intake per discussion with MD. Loose stool per rectum and FMS placed overnight in addition to colostomy output and 3.4 L UOP total yesterday after diuresis- contributing to hypokalemia with low normal magnesium- to be replaced to assist with K repletion. 3/9: Extubated 3/7, NGT keep in place post extubation and tube feeding at goal. Diet started after swallow evaluation this morning, pt lethargic with anticipated poor meal intake- will continue feeds, change to bolus regimen and monitor adequacy of meal intake per MD.  
3/7: Tolerating feeds at 30 mL/hr, residual  mL. Receiving mout etoclopramide & miralax held this morning after large loose stool filled colostomy bag. 
3/6: Feeds advanced to 20 mL/hr then rate dropped back down to 10 mL/hr after 200 mL residual. Pt with chronic constipation, hx of narcotic use, partial paralysis and prolonged ileus anticipated per MD. Lantus increased for hyperglycemia, hypernatremia improving. 3/5: Tolerating trickle feeds, hypernatremia noted. 3/4: Minimal out from colostomy with 1100 mL out from NGT- will start trial of trickle feeding and monitor. 3/3: Remains intubated, no nausea/vomiting, no ostomy function. No output documented from NGT.  
3/2: S/p ex lap, descending colectomy and creation of colostomy on 3/1 then re-ex lap, drainage of intra-abdominal stool collection, descending colectomy with mobilization of the splenic flexure and transverse colostomy. Remains intubated on the vent postop. Nutritional intake adequate to meet patients estimated nutritional needs:  No  
 
Diet: DIET NUTRITIONAL SUPPLEMENTS Breakfast, Lunch, Dinner; MAGIC CUPS 
DIET FULL LIQUID Food Allergies: NKFA Current Appetite: Fair Appetite/meal intake prior to admission: Fair, consuming soft foods; good meal intake PTA per family report Feeding Limitations:  [x] Swallowing difficulty: SLP following    [] Chewing difficulty    [x] Other: weakness, mouth pain Current Meal Intake:  
Patient Vitals for the past 100 hrs: 
 % Diet Eaten 03/11/20 1855 0 % 03/11/20 1300 10 % 03/11/20 0852 20 % 03/10/20 1725 10 % 03/10/20 1300 10 % 03/10/20 0900 50 % BM: 3/12, colostomy (150 mL output x past 24 hours and 100 mL output from FMS) Skin Integrity: abdominal surgical incision with wound VAC Edema:   [] No     [x] Yes Pertinent Medications: Reviewed: SSI, steroid, 10 units lantus, pantoprazole, levothyroxine po, furosemide, spironolactone, nystatin Recent Labs  
  03/12/20 
0510 03/11/20 2015 03/11/20 
0400  143 147* K 3.0* 3.3* 2.6*  
 108 110 CO2 29 30 32 GLU 85 104* 72*  
BUN 8 8 9 CREA 0.46* 0.40* 0.55* CA 7.6* 7.2* 7.8*  
MG 1.7  --  2.2 PHOS 2.8  --  2.1* ALB 1.4*  --  1.5* SGOT 19  --  18 ALT 15  --  17 Intake/Output Summary (Last 24 hours) at 3/13/2020 1540 Last data filed at 3/13/2020 6071 Gross per 24 hour Intake 400 ml Output 1750 ml Net -1350 ml Anthropometrics: 
Ht Readings from Last 1 Encounters:  
03/03/20 5' 4\" (1.626 m) Last 3 Recorded Weights in this Encounter 03/11/20 1305 03/12/20 0135 03/13/20 4993 Weight: 93.2 kg (205 lb 7.5 oz) 93.8 kg (206 lb 11.2 oz) 91.6 kg (201 lb 14.4 oz) Body mass index is 34.66 kg/m². Obese, Class II Weight History: previously reported intentional weight loss and previous usual weight of 230 lb; patient reports recent usual weight of 175 lb prior to admission Weight Metrics 3/13/2020 2/28/2020 1/22/2020 1/17/2020 1/2/2020 12/16/2019 12/14/2019 Weight 201 lb 14.4 oz - 171 lb 11.2 oz - 172 lb 6.4 oz - 178 lb 9.6 oz BMI 34.66 kg/m2 28.57 kg/m2 - 28.57 kg/m2 - 28.69 kg/m2 - Some encounter information is confidential and restricted. Go to Review Flowsheets activity to see all data. Admitting Diagnosis: Septic shock ([facility] [address] [A41.9, R65.21] Perforated viscus [R19.8] Pertinent PMHx: COPD, HTN, DM, hepatitis C, sarcoidosis, methadone use, GERD with hiatal hernia, hypothyroidism Procedures: Spine Thoracic Laminectomy T6-T11 with Fusion (12/11/20) 2/2 compression fx of T9-T10 and spinal cord compression at T9 and T10 and spinal cord edema with subsequent paraparesis Education Needs:        [x] None identified  [] Identified - Not appropriate at this time  []  Identified and addressed - refer to education log Learning Limitations:   [x] None identified  [] Identified:  
Cultural, Caodaism & ethnic food preferences:  [x] None identified    [] Identified and addressed ESTIMATED NUTRITION NEEDS:  
 
Calories: 4798-3857 kcal (MSJx1.2-1.5) based on  [x] Actual BW 88 kg     [] IBW Protein:  gm (1-1.5 gm/kg) based on  [x] Actual BW      [] IBW Fluid: 1 mL/kcal 
  
MONITORING & EVALUATION:  
  
Nutrition Goal(s):  
- PO nutrition intake will meet >75% of patient estimated nutritional needs within the next 7 days. Outcome: Progressing towards goal   
- Improvement in transit time, patient will have 200-600 mL/d colostomy output within the next 7 days.   Outcome: Progressing towards goal   
 - Patient blood glucose levels will maintain WNL of  mg/dL within the next 5-7 days. Outcome: Progressing towards goal      
 
Monitoring:   [x] Food and nutrient intake   [x] Food and nutrient administration  [x] Comparative standards   [x] Nutrition-focused physical findings   [x] Anthropometric Measurements   [x] Treatment/therapy   [x] Biochemical data, medical tests, and procedures Previous Recommendations (for follow-up assessments only): Implemented Discharge Planning: Nutritional discharge needs unknown at this time. Participated in care planning, discharge planning, & interdisciplinary rounds as appropriate. Tamara Thomson RD, [facility] Pager: [phone]

## 2020-03-13 NOTE — PROGRESS NOTES
Infectious Disease progress Note Reason: sepsis, gram negative bacteremia, staph epidermidis bacteremia Current abx Prior abx Pip/tazo since 3/1 Cefepime, metronidazole 2/29-3/1 Vancomycin 2/29-3/2/20 Vancomycin 3/6/20-3/12/20 Lines:  
 
 
Assessment : 
 
62 y.o. female with PMH of COPD (on 3LPM NC home O2), Sarcoidosis, HTN, T2DM (last hgbA1C 8.2 on 2/29),  Hep C, recent Spine Thoracic Laminectomy T6-T11 with Fusion (12/11/20) 2/2 compression fx of T9-T10 and spinal cord compression at T9 and T10 and spinal cord edema with subsequent paraparesis  who presented to [address] [place] [address] ED on 02/29/20 c/o acute abdominal pain, SOB. Clinical presentation c/w septic shock (POA) due to prevotella bloodstream infection (positive blood culture 2/29), sigmoid perforation, secondary peritonitis s/p emergent Ex lap on 2/29/20. Intra operatively found to have sigmoid colon perforation, 2/2 constipation with large amount of stool burden spilling into the peritoneum per Dr. Mendy Ruiz. S/p Re-exploratory laparotomy, drainage of intra-abdominal stool collection, descending colectomy with mobilization of the splenic flexure, and transverse colostomy on 3/1. Significant pyuria on UA 2/29. Urine culture >100,000 e.coli suggestive of probable cystitis. Acute on chronic respiratory failure - likely due to sepsis. Sputum culture 3/1- moraxella (beta lactamase positive). Diffuse pulmonary opacities concerning for aspiration pneumonia. Single positive blood culture for epidermidis on 3/3 seemed likely contaminant since patient was improving off vancomycin. However, Blood culture 3/5: Positive for Staphylococcus epidermidis. Negative blood cultures 3/9/2020. At this time it is difficult to determine the significance of staph epidermidis bacteremia. Management complicated by inability to remove central line since patient has difficult IV access and unable to obtain peripheral IV line.   Orders for midline placed on 3/9/2020. abx management complicated due to h/o pcn allergy. Currently tolerating pip/tazo without difficulties. Clinically better. Resolved abdominal tenderness. Recommendations: 1. continue pip/tazo for total 14 days 2. F/u surgery recommendations 3. Monitor CBC, clinically. Above plan was discussed in details with primary team. Please call me if any further questions or concerns. Will continue to participate in the care of this patient. HPI: 
 
Denies significant abdominal pain. No nausea or vomiting. No subjective fever/chills. home Medication List  
 Details  
roflumilast (DALIRESP) 250 mcg tab Take 250 mcg by mouth daily. Qty: 30 Tab, Refills: 0  
  
ARIPiprazole (ABILIFY) 10 mg tablet Take 1 Tab by mouth. aspirin-calcium carbonate 81 mg-300 mg calcium(777 mg) tab Take 1 Tab by mouth. divalproex DR (DEPAKOTE) 250 mg tablet Take 1 Tab by mouth. doxycycline (MONODOX) 100 mg capsule   
  
gabapentin (NEURONTIN) 300 mg capsule Take 1 Cap by mouth. ipratropium (ATROVENT) 0.02 % soln   
  
methylPREDNISolone (MEDROL DOSEPACK) 4 mg tablet   
  
oxyCODONE-acetaminophen (PERCOCET) 5-325 mg per tablet Take 1 Tab by mouth. tolterodine (DETROL) 1 mg tablet Take 1 Tab by mouth. traMADol (ULTRAM) 50 mg tablet Take 1 Tab by mouth. traZODone (DESYREL) 100 mg tablet Take 1 Tab by mouth. !! busPIRone (BUSPAR) 15 mg tablet Take 15 mg by mouth two (2) times a day. Indications: repeated episodes of anxiety Qty: 60 Tab, Refills: 2 OXYGEN-AIR DELIVERY SYSTEMS 3 L/min by Nasal route continuous. [facility] [employer] O2  
  
albuterol-ipratropium (DUO-NEB) 2.5 mg-0.5 mg/3 ml nebu 3 mL by Nebulization route every six (6) hours as needed for Wheezing. Qty: 30 Nebule, Refills: 1  
  
!! busPIRone (BUSPAR) 10 mg tablet Take 10 mg by mouth three (3) times daily. Indications: repeated episodes of anxiety cholecalciferol (VITAMIN D3) (1000 Units /25 mcg) tablet Take 1,000 Units by mouth two (2) times a day. levothyroxine (SYNTHROID) 100 mcg tablet Take 100 mcg by mouth Daily (before breakfast). insulin glargine (LANTUS U-100 INSULIN) 100 unit/mL injection 20 Units by SubCUTAneous route Daily (before breakfast). famotidine (PEPCID) 20 mg tablet Take 20 mg by mouth nightly. aspirin 81 mg chewable tablet Take 1 Tab by mouth daily (with breakfast). Indications: stroke prevention 
Qty: 30 Tab, Refills: 0  
  
docusate sodium (COLACE) 100 mg capsule Take 1 Cap by mouth daily (after breakfast). Indications: constipation 
Qty: 30 Cap, Refills: 0  
  
predniSONE (DELTASONE) 20 mg tablet Take 20 mg by mouth daily (with breakfast). Indications: sarcoidosis Qty: 30 Tab, Refills: 0 Associated Diagnoses: Sarcoidosis  
  
ascorbic acid, vitamin C, (VITAMIN C) 250 mg tablet Take 1 Tab by mouth daily (with breakfast). Qty: 30 Tab, Refills: 0 Associated Diagnoses: Acute blood loss as cause of postoperative anemia  
  
calcium citrate 200 mg (950 mg) tablet Take 1 Tab by mouth two (2) times a day. Indications: post-menopausal osteoporosis prevention 
Qty: 60 Tab, Refills: 0 Associated Diagnoses: Status post laminectomy with spinal fusion  
  
ferrous sulfate 325 mg (65 mg iron) tablet Take 1 Tab by mouth daily (with breakfast). Qty: 30 Tab, Refills: 0 Associated Diagnoses: Acute blood loss as cause of postoperative anemia  
  
acetaminophen (TYLENOL) 325 mg tablet Take 2 Tabs by mouth every four (4) hours as needed for Pain. Indications: pain 
Qty: 60 Tab, Refills: 0 Associated Diagnoses: Status post laminectomy with spinal fusion  
  
brief disposable (ADULT) misc by Does Not Apply route. Qty: 1 Package, Refills: 0 Associated Diagnoses: Urge urinary incontinence  
  
methadone (DOLOPHINE) 5 mg tablet Take 4 Tabs by mouth daily. Max Daily Amount: 20 mg. 
Qty: 10 Tab, Refills: 0 Associated Diagnoses: History of back injury  
  
polyethylene glycol (MIRALAX) 17 gram/dose powder Take 17 g by mouth daily. 1 tablespoon with 8 oz of water daily 
Qty: 289 g, Refills: 0  
  
umeclidinium-vilanterol (ANORO ELLIPTA) 62.5-25 mcg/actuation inhaler Take 1 Puff by inhalation daily. Qty: 1 Inhaler, Refills: 0 BD INSULIN SYRINGE ULTRA-FINE 1 mL 31 gauge x 5/16 syrg   
  
rosuvastatin (CRESTOR) 5 mg tablet TAKE ONE TABLET NIGHTLY Qty: 90 Tab, Refills: 3  
  
albuterol (PROAIR HFA) 90 mcg/actuation inhaler INHALE 2 PUFFS EVERY 4 HOURS AS NEEDED FOR WHEEZING Qty: 1 Inhaler, Refills: 5  
  
oxybutynin (DITROPAN) 5 mg tablet Take 5 mg by mouth two (2) times a day. allopurinol (ZYLOPRIM) 100 mg tablet Take 100 mg by mouth daily. insulin lispro (HUMALOG) 100 unit/mL injection To be given 15 min before meals: < 150 - None, 151-200 - 2 u, 201-250 - 4 u, 251-300 - 6 u, 301-350 - 8 u, 351-400 - 10 u, >400 - 12 u Qty: 1 Vial, Refills: 0 Associated Diagnoses: Type 2 diabetes mellitus with stage 3 chronic kidney disease, with long-term current use of insulin ([facility])  
  
insulin syringe,safetyneedle 1 mL 30 gauge x 5/16\" syrg As directed Qty: 50 Each, Refills: 0 Nebulizer Accessories Kit Use with nebulizer machine Qty: 1 Kit, Refills: prn  
 Associated Diagnoses: COPD (chronic obstructive pulmonary disease) ([facility])  
  
sertraline (ZOLOFT) 50 mg tablet Take 50 mg by mouth daily. Indications: Bipolar Depression Current Facility-Administered Medications Medication Dose Route Frequency  albuterol-ipratropium (DUO-NEB) 2.5 MG-0.5 MG/3 ML  3 mL Nebulization QID RT  
 oxyCODONE-acetaminophen (PERCOCET) 5-325 mg per tablet 1 Tab  1 Tab Oral Q6H PRN  
 acetaminophen (TYLENOL) tablet 325 mg  325 mg Oral Q6H PRN  
 spironolactone (ALDACTONE) tablet 25 mg  25 mg Oral DAILY  furosemide (LASIX) tablet 20 mg  20 mg Oral DAILY  multivit-folic acid-herbal 252 (WELLESSE PLUS) oral liquid 30 mL  30 mL Oral DAILY  predniSONE (DELTASONE) tablet 30 mg  30 mg Oral DAILY WITH BREAKFAST  insulin lispro (HUMALOG) injection   SubCUTAneous AC&HS  pantoprazole (PROTONIX) tablet 40 mg  40 mg Oral ACB&D  
 nystatin (MYCOSTATIN) 100,000 unit/mL oral suspension 500,000 Units  500,000 Units Oral QID  potassium, sodium phosphates (NEUTRA-PHOS) packet 1 Packet  1 Packet Oral QID  insulin glargine (LANTUS) injection 10 Units  10 Units SubCUTAneous DAILY  levothyroxine (SYNTHROID) tablet 100 mcg  100 mcg Oral 6am  
 apixaban (ELIQUIS) tablet 5 mg  5 mg Oral BID  
 amiodarone (CORDARONE) tablet 200 mg  200 mg Oral BID  sodium chloride 3% hypertonic nebulizer soln  4 mL Nebulization BID RT  
 piperacillin-tazobactam (ZOSYN) 4.5 g in 0.9% sodium chloride (MBP/ADV) 100 mL MBP  4.5 g IntraVENous Q6H  
 acetaminophen (TYLENOL) solution 650 mg  650 mg Per NG tube Q4H PRN  
 diphenhydrAMINE (BENADRYL) injection 25 mg  25 mg IntraVENous Q4H PRN  
 sodium chloride (NS) flush 5-10 mL  5-10 mL IntraVENous PRN  
 sodium chloride (NS) flush 5-40 mL  5-40 mL IntraVENous PRN  
 albuterol (ACCUNEB) nebulizer solution 1.25 mg  1.25 mg Nebulization Q6H PRN  
 budesonide (PULMICORT) 500 mcg/2 ml nebulizer suspension  500 mcg Nebulization BID RT  
 glucose chewable tablet 16 g  4 Tab Oral PRN  
 glucagon (GLUCAGEN) injection 1 mg  1 mg IntraMUSCular PRN  
 dextrose (D50W) injection syrg 12.5-25 g  25-50 mL IntraVENous PRN Allergies: Moxifloxacin; Pcn [penicillins]; and Sulfa (sulfonamide antibiotics) Temp (24hrs), Av.3 °F (36.8 °C), Min:97.6 °F (36.4 °C), Max:98.8 °F (37.1 °C) Visit Vitals /76 (BP 1 Location: Left arm, BP Patient Position: At rest) Pulse 66 Temp 98.8 °F (37.1 °C) Resp 18 Ht 5' 4\" (1.626 m) Wt 91.6 kg (201 lb 14.4 oz) LMP 2012 (Exact Date) SpO2 97% BMI 34.66 kg/m² ROS: 12 point review of systems obtained in details pertinent positive as mentioned in history of present illness, otherwise negative Physical Exam: 
 
General:   Laying on the bed, alert awake oriented x3, appears comfortable Head:  Normocephalic, without obvious abnormality, atraumatic. Eyes:  Conjunctivae/corneas clear. PERRL, Nose: Nares normal. Septum midline. Mucosa normal. .  
Throat: Lips, mucosa, and tongue normal. Teeth and gums normal.  
Neck: Supple, symmetrical, trachea midline, no adenopathy, no carotid bruit and no JVD. Right IJ CVC in place. Lungs:    Clear to auscultation bilaterally; no wheezes/rales noted. Heart:  RRR, S1, S2 normal, no m/r/g Abdomen:   Soft,  no significant tenderness. +midline surgical incision, dressing c/d/i. Present bowel sounds. No masses,  No organomegaly. Extremities: Extremities normal, atraumatic, no cyanosis or edema. Pulses: 2+ and symmetric all extremities. Skin: Skin color, texture, turgor normal. No rashes or lesions Neurologic:  Alert awake oriented x3. No gross motor or sensory deficits noted Labs: Results:  
Chemistry Recent Labs  
  03/12/20 
0510 03/11/20 2015 03/11/20 
0400 GLU 85 104* 72*  143 147* K 3.0* 3.3* 2.6*  
 108 110 CO2 29 30 32 BUN 8 8 9 CREA 0.46* 0.40* 0.55* CA 7.6* 7.2* 7.8* AGAP 7 5 5 BUCR 17 20 16 AP 46  --  50  
TP 5.4*  --  5.9* ALB 1.4*  --  1.5*  
GLOB 4.0  --  4.4* AGRAT 0.4*  --  0.3* CBC w/Diff Recent Labs  
  03/12/20 
0510 03/11/20 
0400 WBC 7.5 7.9  
RBC 2.77* 3.19* HGB 7.5* 8.5* HCT 23.2* 27.1*  
 316 GRANS 66 51 LYMPH 17* 23 EOS 0 3 Microbiology No results for input(s): CULT in the last 72 hours. RADIOLOGY: 
 
All available imaging studies/reports in [facility] care for this admission were reviewed Dr. Maria De Jesus Kwon, Infectious Disease Specialist 
[phone] March 13, 2020 
8:38 AM

## 2020-03-13 NOTE — PROGRESS NOTES
conducted a Follow up consultation and Spiritual Assessment for Rosalia Nogueira, who is a 62 y.o.,female. The  provided the following Interventions: 
Continued the relationship of care and support. Listened empathically. Offered prayer and assurance of continued prayer on patients behalf. Chart reviewed. The following outcomes were achieved: 
Patient expressed gratitude for 's visit. Assessment: 
There are no further spiritual or Jainism issues which require Spiritual Care Services interventions at this time. Plan: 
Chaplains will continue to follow and will provide pastoral care on an as needed/requested basis.  recommends bedside caregivers page  on duty if patient shows signs of acute spiritual or emotional distress. [address] [facility] Spiritual Care  
[phone]

## 2020-03-13 NOTE — PROGRESS NOTES
Patient completed ROM as follows. Bilateral Lower Extremity Exercise: 
 
 
EXERCISE Sets Reps Active Active Assist  
Passive Self ROM Comments Ankle Pumps 1 10  [] [] [x] [] Heel Slides 1 10  [] [] [x] [] Hip Abd/adduction   [] [] [] [] Quad Sets   [] [] [] [] Hold for 5 secs Glut Sets   [] [] [] [] Hold for 5 secs Short Arc Quads   [] [] [] [] Straight Leg Raises   [] [] [] [] Seated Long Arc Quads   [] [] [] [] Seated Marching   [] [] [] [] Seated HS curl   [] [] [] []   
Standing Marching   [] [] [] []   
 
 
Pain:  
Pain Level Before FMP: Pt stated that her abdomen was hurting and the pain was progressing to her LE. Pain Level After FMP: Same 
 
[x]  Alerted nurse. [x]  Call bell and phone within patient reach. [x]   Patient resting in bed with no apparent distress . NOTE: Pt stated that she had pain and a burning sensation from her abdomen to her BLE so I did partial heel slides that involved only flex/ext of the knee. Thank you, 
Dl Ball, Rehab Technician

## 2020-03-13 NOTE — ROUTINE PROCESS
ADULT PROTOCOL: JET AEROSOL ASSESSMENT Patient  Mello Sinha     62 y.o.   female     3/12/2020  9:11 PM 
 
Breath Sounds Pre Procedure:  Breath Sounds Bilateral: Coarse Breath Sounds Post Procedure: Breath Sounds Bilateral: Coarse Breathing pattern: Pre procedure  Breathing Pattern: Regular Post procedure  Breathing Pattern: Regular Cough: Pre procedure  Cough: Non-productive Post procedure Cough: Non-productive Heart Rate: Pre procedure Pulse: 75 Post procedure Pulse: 79 Resp Rate: Pre procedure  Respirations: 16 Post procedure Nebulizer Therapy: Current medications Aerosolized Medications: MARICARMEN Stallings Problem List:  
Patient Active Problem List  
Diagnosis Code  Diabetic neuropathy associated with type 2 diabetes mellitus (HCC) E11.40  Venous insufficiency I87.2  Obesity, Class I, BMI 30-34.9 E66.9  Chronic back pain M54.9, G89.29  
 Generalized osteoarthritis of multiple sites M15.9  B[facility] F31.9  Abnormally low high density lipoprotein (HDL) cholesterol with hypertriglyceridemia E78.6, E78.1  Diastolic dysfunction without heart failure I51.89  Chronic obstructive pulmonary disease (COPD) ([facility] J44.9  Hypertensive heart disease without heart failure I11.9  Depression F32.9  History of back injury Z87.828  Type 2 diabetes mellitus with diabetic neuropathy, with long-term current use of insulin ([facility]) E11.40, Z79.4  Anxiety F41.9  Wears glasses Z97.3  History of hepatitis C Z86.19  
 Sickle cell trait (HCC) D57.3  History of acute renal failure Z87.448  Hypothyroidism E03.9  Recurrent genital herpes A60.00  Sarcoidosis D86.9  Abscess of right arm L02.413  Hypoxemia requiring supplemental oxygen R09.02, Z99.81  
 Abnormal nuclear stress test R94.39  
  Cellulitis and abscess of hand L03.119, L02.519  
 Cellulitis and abscess of foot L03.119, L02.619  
 Cellulitis of arm, right L03. 113  
 Olecranon bursitis of right elbow M70.21  
 Contusion of left elbow S50. 02XA  Intravenous drug user F19.90  Right Achilles tendinitis M76.61  
 History of penicillin allergy Z88.0  Falls frequently R29.6  Gastroesophageal reflux disease with hiatal hernia K21.9, K44.9  Memory difficulty R41.3  Mixed connective tissue disease ([facility] M35.1  Impaired mobility and ADLs Z74.09  
 Hyperuricemia E79.0  History of vitamin D deficiency Z86.39  
 Urge urinary incontinence N39.41  Spinal cord compression ([facility]) G95.20  Compression fracture of body of thoracic vertebra ([facility]) S22.000A  Status post laminectomy with spinal fusion Z98.1  Acute blood loss as cause of postoperative anemia D62  
 History of fracture due to fall Z87.81  Chronic respiratory failure with hypoxia ([facility]) J96.11  
 Cellulitis of right forearm L03. 113  
 Gout M10.9  Menopause Z78.0  Long term current use of aspirin Z79.82  
 Opioid dependence ([facility]) F11.20  Tobacco use disorder F17.200  Sepsis ([facility] A41.9  Perforated viscus R19.8  Septic shock ([facility]) A41.9, R65.21 Patient alert and cooperative to use MDI: Yes 
 
Home Respiratory Therapy Regimen/Frequency:  YES Medication Duoneb/ProAir Device HHN/Inhaler Frequency PRN SEVERITY INDEX: 
 
ITEM 0 1 2 3 4 Score Respiratory Pattern and or Rate Regular 10-19 Regular 20-24  
24-30   
30-34 Severe SOB or  
Greater than 35 1 Breath Sounds Clear Occasional Wheeze Mild Wheezing Moderate Wheezing  wheezing/Absent breath sounds 1 Shortness of Breath None Dyspnea on Exertion Dyspnea at Rest Moderate Shortness of Breath at Rest Severe Shortness of Breath - Limited Speech 1 Total Score:  3 * Scoring Guidelines 0-4 pts:  PRN-BID  
5-7 pts:  BID, TID, QID 
8-9 pts:  TID, QID, Q6 
 10-12 pts:  Q4-Q6 * - Guidelines used with clinical judgement. PRN Treatments can be ordered to supplement scheduled treatments. Regardless of score, frequency should not be less than normal home regimen. Recommended Order/Frequency:  Change to QID Comments:   
 
 
 
Respiratory Therapist: Willy Harley RT

## 2020-03-13 NOTE — PROGRESS NOTES
Problem: Dysphagia (Adult) Goal: *Acute Goals and Plan of Care (Insert Text) Description: Patient will: 1. Tolerate PO trials with 0 s/s overt distress in 4/5 trials 2. Utilize compensatory swallow strategies/maneuvers (decrease bite/sip, size/rate, alt. liq/sol) with min cues in 4/5 trials 3. Perform oral-motor/laryngeal exercises to increase oropharyngeal swallow function with min cues 4. Complete an objective swallow study (i.e., MBSS) to assess swallow integrity, r/o aspiration, and determine of safest LRD, min A as indicated/ordered by MD  
 
Recommend:  
Full liquid diet Strong encouragement for intake Meds per pt preference Aspiration precautions HOB >45 degrees during all intake and for at least 30 min after po Small bites/sips, slow rate of intake, alternating bites/sips Oral care three times daily Outcome: Progressing Towards Goal 
  
SPEECH LANGUAGE PATHOLOGY DYSPHAGIA TREATMENT Patient: Kaushal Daniels (65 y.o. female) Date: 3/13/2020 Diagnosis: Septic shock ([facility] [A41.9, R65.21] Perforated viscus [R19.8] <principal problem not specified> Procedure(s) (LRB): 
LAPAROTOMY EXPLORATORY, DESCENDING COLECTOMY, CREATION OF COLOSTOMY (N/A) 12 Days Post-Op Precautions: Aspiration, Fall(Spinal sx (12/2019)) PLOF: As per H&P   
 
ASSESSMENT: 
Pt seen for follow up dysphagia tx, continuing to report burning in mouth and refusing Nystatin per RN and pt report. Pt reporting good intake of full liquid breakfast meal.  Demo incomplete mastication with over 50% of bolus remaining on lingual surface despite multiple liquid washes. Able to clear pudding and tolerate thin liquids + straw with no overt s/sx aspiration. Recommend continue current diet with use of the above mentioned compensatory strategies/aspiration precautions with strong encouragement for intake. Encouraged pt to increase po intake and to be compliant with meds. Results/recommendations discussed with pt and RN. Pt verbalized understanding; however, suspect limited carryover. Progression toward goals: 
[]         Improving appropriately and progressing toward goals 
[]         Improving slowly and progressing toward goals [x]         Not making progress toward goals and plan of care will be adjusted PLAN: 
Recommendations and Planned Interventions: 
Patient continues to benefit from skilled intervention to address the above impairments. Continue treatment per established plan of care. Discharge Recommendations: To Be Determined SUBJECTIVE:  
Patient stated I am eating, Piedad Few. OBJECTIVE:  
Cognitive and Communication Status: 
Neurologic State: Alert Orientation Level: Oriented X4 Cognition: Follows commands Perception: Appears intact Perseveration: No perseveration noted Safety/Judgement: Fall prevention Dysphagia Treatment: 
Oral Assessment: 
Oral Assessment Labial: No impairment Dentition: Natural, Limited Oral Hygiene: Poor Lingual: Decreased strength, Decreased rate Velum: Unable to visualize Mandible: No impairment P.O. Trials: 
 Patient Position: 45 at [facility] Vocal quality prior to P.O.: No impairment Consistency Presented: Thin, Mech soft, pudding How Presented: SLP-fed/presented, Self-fed/presented, Cup/sip, Spoon, Straw, Successive swallows Bolus Acceptance: No impairment Bolus Formation/Control: Impaired Type of Impairment: Delayed, Incomplete, Poor, Posterior, Mastication Propulsion:  absent with mech soft; over 50% remained on lingual surface post swallow Initiation of Swallow: No impairment Laryngeal Elevation: Functional 
 Aspiration Signs/Symptoms: None Pharyngeal Phase Characteristics: Poor endurance, Easily fatigued Effective Modifications: None Cues for Modifications: Moderate Oral Phase Severity: Moderate Pharyngeal Phase Severity:  No impairment PAIN: 
Start of Tx: 0 End of Tx: 0 After treatment: []              Patient left in no apparent distress sitting up in chair 
[x]              Patient left in no apparent distress in bed 
[x]              Call bell left within reach [x]              Nursing notified 
[]              Family present 
[]              Caregiver present 
[]              Bed alarm activated COMMUNICATION/EDUCATION:  
[x] Aspiration precautions; swallow safety; compensatory techniques []        Patient/family able to participate in training and education  
[]  Patient unable to participate in training and education, education ongoing with staff [x] Patient understands goals and intent of therapy; neutral about participation Sintia Martínez M.S., CCC-SLP Speech-Language Pathologist

## 2020-03-13 NOTE — ROUTINE PROCESS
Received bedside report from [facility], patient was resting in bed, HOB elevated, bed in lowest position and call button within reach of patient. Night  was unable to draw labs due to edema, technician will ask day shift to attempt lab draw. Gave bedside report to [facility], using SBAR, MAR, and Kardex.

## 2020-03-13 NOTE — PROGRESS NOTES
[employer] [facility]. Pulmonary, Critical Care, and Sleep Medicine Name: Lee Ann Epstein MRN: [id number] : 1955 Hospital: [address] [place] [address] Dr Date: 3/13/2020  Admission Date: 2020 Chart and notes reviewed. Data reviewed. I have evaluated all findings. [x]I have reviewed the flowsheet and previous days notes. Interval HPI:Patient is a 62 y. o. female with PMH of COPD (on 3LPM NC home O2), Sarcoidosis, HTN, T2DM,  Hep C, recent Spine Thoracic Laminectomy T6-T11 with Fusion (20) 2/2 compression fx of T9-T10 and spinal cord compression at T9 and T10 and spinal cord edema with subsequent paraparesis, who presented to [facility] ED on 20 c/o acute abdominal pain, SOB with associated constipation x4-5 days PTA, likely 2/2 aforementioned spinal surgery on 19. CT Chest/Abd/Pelvis showed intraperitoneal free air consistent with perforated viscus. Required intubation, Underwent emergent ex lap , was found to have sigmoid colon perforation, 2/2 constipation w/ large stool burden spilling into the peritoneum per Dr. Sujatha Esposito. Dr. Sujatha Esposito took pt back to OR on 3/1 for abdominal washout and closure. ICU stay complicated by persistent vasopressor requirement and afib. Subjective 
 
 20 Pt stable overnight. No fevers. Breathing stable on home O2 requirements. Overnight events: tolerating salter NC 4L well. Mentation/Activity: AxO x3, following commands Respiratory- dry cough ROS:Review of systems not obtained due to patient factors. Events and notes from last 24 hours reviewed. Patient Active Problem List  
Diagnosis Code  Diabetic neuropathy associated with type 2 diabetes mellitus (HCC) E11.40  Venous insufficiency I87.2  Obesity, Class I, BMI 30-34.9 E66.9  Chronic back pain M54.9, G89.29  
 Generalized osteoarthritis of multiple sites M15.9  Bipolar affective disorder ([facility] F31.9  Abnormally low high density lipoprotein (HDL) cholesterol with hypertriglyceridemia E78.6, E78.1  Diastolic dysfunction without heart failure I51.89  Chronic obstructive pulmonary disease (COPD) ([facility] J44.9  Hypertensive heart disease without heart failure I11.9  Depression F32.9  History of back injury Z87.828  Type 2 diabetes mellitus with diabetic neuropathy, with long-term current use of insulin ([facility]) E11.40, Z79.4  Anxiety F41.9  Wears glasses Z97.3  History of hepatitis C Z86.19  
 Sickle cell trait ([facility]) D57.3  History of acute renal failure Z87.448  Hypothyroidism E03.9  Recurrent genital herpes A60.00  Sarcoidosis D86.9  Abscess of right arm L02.413  Hypoxemia requiring supplemental oxygen R09.02, Z99.81  
 Abnormal nuclear stress test R94.39  
 Cellulitis and abscess of hand L03.119, L02.519  
 Cellulitis and abscess of foot L03.119, L02.619  
 Cellulitis of arm, right L03. 113  
 Olecranon bursitis of right elbow M70.21  
 Contusion of left elbow S50. 02XA  Intravenous drug user F19.90  Right Achilles tendinitis M76.61  
 History of penicillin allergy Z88.0  Falls frequently R29.6  Gastroesophageal reflux disease with hiatal hernia K21.9, K44.9  Memory difficulty R41.3  Mixed connective tissue disease ([facility] M35.1  Impaired mobility and ADLs Z74.09  
 Hyperuricemia E79.0  History of vitamin D deficiency Z86.39  
 Urge urinary incontinence N39.41  Spinal cord compression ([facility]) G95.20  Compression fracture of body of thoracic vertebra ([facility]) S22.000A  Status post laminectomy with spinal fusion Z98.1  Acute blood loss as cause of postoperative anemia D62  
 History of fracture due to fall Z87.81  Chronic respiratory failure with hypoxia ([facility]) J96.11  
 Cellulitis of right forearm L03. 113  
 Gout M10.9  Menopause Z78.0  Long term current use of aspirin Z79.82  
 Opioid dependence ([facility]) F11.20  Tobacco use disorder F17.200  Sepsis ([address] A41.9  Perforated viscus R19.8  Septic shock (HCC) A41.9, R65.21 Vital Signs: 
Visit Vitals /76 (BP 1 Location: Left arm, BP Patient Position: At rest) Pulse 66 Temp 98.8 °F (37.1 °C) Resp 18 Ht 5' 4\" (1.626 m) Wt 91.6 kg (201 lb 14.4 oz) LMP 2012 (Exact Date) SpO2 97% BMI 34.66 kg/m² O2 Device: Nasal cannula O2 Flow Rate (L/min): 3 l/min Temp (24hrs), Av.3 °F (36.8 °C), Min:97.6 °F (36.4 °C), Max:98.8 °F (37.1 °C) Intake/Output:  
Last shift:       0701 -  1900 In: 400 [P.O.:200; I.V.:200] Out: - Last 3 shifts:  190 -  0700 In: 2633 [P.O.:240; I.V.:1100] Out: 2120 [Urine:1400; Drains:550] Current Facility-Administered Medications Medication Dose Route Frequency  albuterol-ipratropium (DUO-NEB) 2.5 MG-0.5 MG/3 ML  3 mL Nebulization QID RT  
 spironolactone (ALDACTONE) tablet 25 mg  25 mg Oral DAILY  furosemide (LASIX) tablet 20 mg  20 mg Oral DAILY  multivit-folic acid-herbal 928 (WELLESSE PLUS) oral liquid 30 mL  30 mL Oral DAILY  predniSONE (DELTASONE) tablet 30 mg  30 mg Oral DAILY WITH BREAKFAST  insulin lispro (HUMALOG) injection   SubCUTAneous AC&HS  pantoprazole (PROTONIX) tablet 40 mg  40 mg Oral ACB&D  
 nystatin (MYCOSTATIN) 100,000 unit/mL oral suspension 500,000 Units  500,000 Units Oral QID  potassium, sodium phosphates (NEUTRA-PHOS) packet 1 Packet  1 Packet Oral QID  insulin glargine (LANTUS) injection 10 Units  10 Units SubCUTAneous DAILY  levothyroxine (SYNTHROID) tablet 100 mcg  100 mcg Oral 6am  
 apixaban (ELIQUIS) tablet 5 mg  5 mg Oral BID  
 amiodarone (CORDARONE) tablet 200 mg  200 mg Oral BID  sodium chloride 3% hypertonic nebulizer soln  4 mL Nebulization BID RT  
 piperacillin-tazobactam (ZOSYN) 4.5 g in 0.9% sodium chloride (MBP/ADV) 100 mL MBP  4.5 g IntraVENous Q6H  
  budesonide (PULMICORT) 500 mcg/2 ml nebulizer suspension  500 mcg Nebulization BID RT Telemetry: [x]Sinus []A-flutter []Paced []A-fib []Multiple PVCs Physical Exam:  
  
General: Awake and alert, follows commands HEENT:  Anicteric sclerae; pink palpebral conjunctivae; mucosa moist 
Resp:  Symmetrical chest expansion, no accessory muscle use; good airway entry; no rales/ wheezing/ rhonchi noted CV:  S1, S2 present; regular rate and rhythm GI:  Abdomen soft, ostomy healthy, rectal tube, wound vac Extremities:  +2 pulses on all extremities; no edema/ cyanosis/ clubbing noted Skin:  Warm; no rashes/ lesions noted, normal turgor/cap refill Neurologic:  Non-focal, follows commands DATA: 
MAR reviewed and pertinent medications noted or modified as needed Labs: 
Recent Labs  
  03/12/20 0510 03/11/20 
0400 WBC 7.5 7.9 HGB 7.5* 8.5* HCT 23.2* 27.1*  
 316 Recent Labs  
  03/12/20 0510 03/11/20 2015 03/11/20 
0400  143 147* K 3.0* 3.3* 2.6*  
 108 110 CO2 29 30 32 GLU 85 104* 72*  
BUN 8 8 9 CREA 0.46* 0.40* 0.55* CA 7.6* 7.2* 7.8*  
MG 1.7  --  2.2 PHOS 2.8  --  2.1* ALB 1.4*  --  1.5* SGOT 19  --  18 ALT 15  --  17 No results for input(s): PH, PCO2, PO2, HCO3, FIO2 in the last 72 hours. No results for input(s): FIO2I, IFO2, HCO3I, IHCO3, HCOPOC, PCO2I, PCOPOC, IPHI, PHI, PHPOC, PO2I, PO2POC in the last 72 hours. No lab exists for component: IPOC2 Imaging: 
[x]   I have personally reviewed the patients radiographs and reports XR Results (most recent): 
Results from [facility] Encounter encounter on 02/29/20 XR CHEST SNGL V  
 Narrative EXAM:  XR CHEST SNGL V 
 
INDICATION:   f/u respiratory failure COMPARISON: 3/8/2020 FINDINGS:  
 
NG tube removed. No new parenchymal opacities in either lung. Stable heart size. No pneumothorax or other acute changes.  
  
 Impression IMPRESSION: 
 
Stable chest.  
 
 
 CT Results (most recent): 
Results from [facility] Encounter encounter on 02/29/20 CT CHEST ABD PELV W CONT Narrative CT SCAN OF THE CHEST, ABDOMEN AND PELVIS, WITH CONTRAST INDICATION: Above. Arrived with shortness of breath and abdominal pain. Septic 
shock. Hypoxia. History of sarcoidosis/COPD. Altered. Abdominal distention and 
tympani. Intraperitoneal free air on chest radiograph. Perforated viscus. COMPARISON: Chest CT 1/21/2010. No prior abdominopelvic CT in PACS. Report is 
noted in the electronic medical record (care everywhere) of previous noncontrast 
enhanced abdominopelvic CT performed elsewhere 7/21/2014. TECHNIQUE: With IV administration of 70 mL Isovue-300, without administration of 
oral contrast, helically acquired serial axial CT images through the chest, 
abdomen and pelvis were obtained. Additional coronal and sagittal reformation 
images were also performed. All CT scans at this facility are performed using dose optimization technique as 
appropriate to the performed exam, to include automated exposure control, 
adjustment of the mA and/or kV according to patient's size (Including 
appropriate matching for site-specific examinations), or use of iterative 
reconstruction technique. FINDINGS: 
 
CT chest:  
 
Endotracheal tube tip is in place cephalad to the level of the alyssa. Esophagogastric tube is noted, tip in the mid stomach. Severe pulmonary emphysematous disease again seen with extensive bullous changes 
most conspicuous in the upper lungs. Scattered scarring. There has been interval 
development of consolidative appearing lung opacities in the lower lobes 
bilaterally consistent with airspace disease likely in addition to atelectasis.  
 
The small stable subpleural pulmonary nodule described in the right lower lobe 
on the recent chest CT is again seen, slightly better visualized on the current 
study measuring approximately 4 mm, unchanged compared to the CT of 2/6/2018 
(axial 34). Multifocal chronic nodular pleuro-parenchymal scarring in the left 
upper lobe without significant interval change compared to the preceding chest 
CT or study of 2/6/2018. No definite suspicious new pulmonary nodule/mass identified compared to the 
preceding CT. No evidence of pathologic lymph node enlargement is seen. No evidence of pleural or significant pericardial effusion. CT abdomen/pelvis:  
 
Small ascites, best seen around the liver and in the anterior pelvis adjacent to 
the distal descending and sigmoid colon. The spleen is heterogeneous in attenuation and demonstrates several rounded 
hypodensities as detailed on the recent chest CT of 1/21/2020. Indeterminate attenuation masses in the kidneys bilaterally, 2.7 cm right kidney 
upper to midpole laterally, 2.1 cm left kidney interpolar region posteriorly. Further evaluation recommended, beginning with ultrasound might be helpful when 
clinically feasible if the corresponding lesions can be identified 
sonographically. Additional smaller subcentimeter renal hypodensities 
bilaterally, possibly cysts, too small to be specifically characterized. The liver, gallbladder, pancreas, and adrenal glands appear unremarkable. Scattered calcifications in the abdominal aorta and its major branches. The 
abdominal aorta enhances normally without abdominal aortic aneurysm. A few scattered small subcentimeter short axis lymph nodes bilaterally in the 
pelvis or retroperitoneum. No evidence of pathologic lymph node enlargement is 
seen. Moderate quantity of intraperitoneal free air in the nondependent anterior 
abdomen, consistent with perforated viscus. Additional scattered foci of free 
air elsewhere in the peritoneum and mesentery, including in the right upper 
quadrant near the gallbladder/duodenum, and scattered bilaterally in the 
mesenteric/peritoneal fat of the upper and lower abdomen.  Grouped small foci of 
 intraperitoneal free air are noted close to the colonic wall along the 
left/anterior side of the sigmoid colon (reference axial 112-119). The left 
hemicolon is diffusely mildly distended to the rectum containing gas, stool, and 
hyperdense material. Clinical correlation might be helpful regarding recent 
ingestion of hyperdense material. The rectum measures approximately 8.5 cm in 
caliber. The sigmoid measures approximately 7.3 cm in caliber on axial image 115. The site of the or free viscus is uncertain. Common sites could include 
duodenal or gastric perforation such as may be seen due to perforated ulcer. However, there is small focal hypoattenuation along the wall of the sigmoid 
colon on series 2 axial images 111-113 which could potentially reflect a small 
mural defect such as could be seen related to constipation, stercoral colitis, 
diverticular disease. The appendix is seen within the ascites in the right lower quadrant, assessment 
for stranding in the periappendiceal fat limited by the ascites, without gross 
abnormal thickening of the appendix seen. No gas within the appendix. The right 
hemicolon appears grossly unremarkable. There is mild diffuse mural thickening of small bowel loops and mild prominence 
of enhancement, nonspecific. No definite associated pneumatosis. No portal 
venous gas is seen. The SMA/SMV appear to maintain usual orientation. Broadly 
speaking differential considerations could include inflammatory, infectious, 
ischemic. Clinical correlation is recommended including with lactate 
measurement. Conceivably sequela of peritonitis in the setting of bowel 
perforation? Uterus is present. Small gas is noted within the uterus and vagina, nonspecific, 
can be seen as a physiologic finding. On review of bone windows, there is a superior endplate compression fracture 
with mildly to moderately decreased vertebral body height at L2.  Bilateral 
 spinal fusion rods spanning from F1-P1-W50-T12. Severe compression fracture 
deformity again seen at T9. Compression fracture with rather pronounced 
decreased vertebral body height again seen at T5. Mild endplate indentations at 
several other levels including T4, T6, T10. The posterior midline fluid 
collection described on the preceding study was better visualized on the 
preceding study, extensive metallic hardware artifacts noted. Impression Impression: Moderate quantity of intraperitoneal free air. Findings are consistent with 
perforated viscus. The site of perforation is uncertain, as discussed above, 
possibly the sigmoid colon where there is apparent small subtle focal 
hypoattenuation along the wall as detailed above (reference axial images 111-113) close to region where several foci of free air are grouped. The left 
hemicolon is mildly distended and filled with hyperdense appearing stool. Mild diffuse mural thickening and enhancement of the small bowel as described. Small volume of ascites best seen around the liver and in the pelvis adjacent to 
the sigmoid. Bibasilar consolidations, right greater than left, suspicious for airspace 
disease/pneumonia. Indeterminate attenuation bilateral renal masses, follow-up evaluation 
recommended. Splenic hypodensities again seen as described on recent chest CT. Intubated. Severe pulmonary emphysematous disease and fibrotic changes. Multiple otherwise nonacute findings as detailed above. I have discussed these results directly with Dr. Justen Wang in the ED at 12:20 p.m. 
in addition to the patient's consulted surgeon at 12:25 PM. IMPRESSION:  
· Acute on Chronic Respiratory Failure - Emergently intubated in ED for airway protection-liberated from ventilator and extubated 3/8/2020 . chronically on 3L NC at home.   
· Acute Sigmoid Colon Perforation w/ peritonitis - s/p emergent ex-lap w/ drainage of intraabdominal stool and collections; sigmoid colectomy with Dr. William Moreira on 02/29/20. Subsequent washout and abdominal closure on 03/01/20.  · Staph bacteremia - Grew on BCx collected 3/3. GNR bacteremia on 2/29, no regrowth · Hypokalemia - Likely 2/2 diuresis. · Hypophosphatemia- ?refeeding syndrome- resolved · Metabolic alkalosis ?diuresis / GI loss · Intermittent Tachyarrhythmia - afibb/flutter - improved · Septic Shock due to peritonitis, UTI · Bilateral PNA - Sputum Cx collected 3/1 grew Moraxella catarrhalis. · Troponemia - demand ischemia, not an MI.  
· Hypothyroidism · ELIF - Likely pre-renal, 2/2 above. Initial ELIF resolved. Pt w/ mild increase in BUN/Cr w/ diuresis. · UTI - UCx grew E.coli.  
· T2DM  
· Paraparesis, in wheelchair: Recent Hx of Spine Thoracic Laminectomy T6-T11 with Fusion (12/11/20) with Dr. Halie Thompson 2/2 Acute compression fx of T9-T10 and spinal cord compression at T9 and T10 and spinal cord edema with subsequent paraparesis. · Hx of Sarcoidosis - Chronic steroid use. · Hx of COPD, on chronic 3L NC, FEV1 51% in 2012, unable to perform repeat studies since  - Follows with Dr. Ligia Garcias in clinic. · Hx of Cocaine and Heroin Abuse - on methadone. Patient Active Problem List  
Diagnosis Code  Diabetic neuropathy associated with type 2 diabetes mellitus (HCC) E11.40  Venous insufficiency I87.2  Obesity, Class I, BMI 30-34.9 E66.9  Chronic back pain M54.9, G89.29  
 Generalized osteoarthritis of multiple sites M15.9  Bipolar affective disorder ([address] F31.9  Abnormally low high density lipoprotein (HDL) cholesterol with hypertriglyceridemia E78.6, E78.1  Diastolic dysfunction without heart failure I51.89  Chronic obstructive pulmonary disease (COPD) ([facility] J44.9  Hypertensive heart disease without heart failure I11.9  Depression F32.9  History of back injury Z87.828  Type 2 diabetes mellitus with diabetic neuropathy, with long-term current use of insulin ([facility]) E11.40, Z79.4  Anxiety F41.9  Wears glasses Z97.3  History of hepatitis C Z86.19  
 Sickle cell trait ([facility]) D57.3  History of acute renal failure Z87.448  Hypothyroidism E03.9  Recurrent genital herpes A60.00  Sarcoidosis D86.9  Abscess of right arm L02.413  Hypoxemia requiring supplemental oxygen R09.02, Z99.81  
 Abnormal nuclear stress test R94.39  
 Cellulitis and abscess of hand L03.119, L02.519  
 Cellulitis and abscess of foot L03.119, L02.619  
 Cellulitis of arm, right L03. 113  
 Olecranon bursitis of right elbow M70.21  
 Contusion of left elbow S50. 02XA  Intravenous drug user F19.90  Right Achilles tendinitis M76.61  
 History of penicillin allergy Z88.0  Falls frequently R29.6  Gastroesophageal reflux disease with hiatal hernia K21.9, K44.9  Memory difficulty R41.3  Mixed connective tissue disease ([address] M35.1  Impaired mobility and ADLs Z74.09  
 Hyperuricemia E79.0  History of vitamin D deficiency Z86.39  
 Urge urinary incontinence N39.41  Spinal cord compression ([facility]) G95.20  Compression fracture of body of thoracic vertebra ([facility]) S22.000A  Status post laminectomy with spinal fusion Z98.1  Acute blood loss as cause of postoperative anemia D62  
 History of fracture due to fall Z87.81  Chronic respiratory failure with hypoxia ([facility]) J96.11  
 Cellulitis of right forearm L03. 113  
 Gout M10.9  Menopause Z78.0  Long term current use of aspirin Z79.82  
 Opioid dependence ([facility]) F11.20  Tobacco use disorder F17.200  Sepsis ([address] A41.9  Perforated viscus R19.8  Septic shock ([address] A41.9, R65.21  
 
  
RECOMMENDATIONS:  
 
Pulm: continue O2 support. Continue duo nebs, maintenance prednisone dose 30 mg daily ID-continue Vanco and Zosyn for peritonitis, per ID Surgical-continue postop wound care, ostomy care, wound VAC.  Recommend D/c of rectal tube as the patient is diverted and it is hindering her participation in rehab. Nutrition-advance as tolerated. Dietary service following Electrolytes-monitor and replace Aggressive PT/OT Rosealee Homans, MD 
03/13/20 Pulmonary, Critical Care Medicine [address] [place] [address] [facility]

## 2020-03-13 NOTE — PROGRESS NOTES
Discharge planning: 
 
Patient continues to progress slowly towards discharge. She is active with [facility] and has personal care services, in the home. This writer will continue to monitor for discharge recommendations. Agnes Mares. MSW Care Manager Pager#: [phone]

## 2020-03-13 NOTE — PROGRESS NOTES
Patient completed ROM as follows. Bilateral Upper Extremity Exercise: 
 
 
EXERCISE Sets Reps Active Active Assist  
Passive Self- assisted ROM Comments Shoulder horizontal abduction  1 10  [] [] [x] [] Shoulder flexion  1 10 [] [] [x] [] Shoulder extension     [] [] [] [] Elbow flex/ext  1  10 [] [] [x] [] Wrist flexion/extension  1  10 [] [x] [] []    
Hand flex/ext  1 10  [x] [] [] []    
     [] [] [] [] Chair pushups   [] [] [] []   
 
 
Pain:  
Pain Level Before FMP: Pt stated that her abdomen was hurting and the pain was progressing to her LE. Pain Level After FMP: Same 
 
[x]  Alerted nurse. [x]  Call bell and phone within patient reach. [x]   Patient resting in bed with no apparent distress . NOTE: Today the patient was able to flex/ext her hands without assistance and was able to slightly raise her RUE. Thank you, 
Sadi Pastor, Rehab Technician

## 2020-03-13 NOTE — ROUTINE PROCESS
Respiratory Care Assessment for Bronchial hygiene or Lung Expansion Therapy Patient  Daniel Sinha     62 y.o.   female     3/12/2020  9:13 PM 
Patient Active Problem List  
Diagnosis Code  Diabetic neuropathy associated with type 2 diabetes mellitus ([facility]) E11.40  Venous insufficiency I87.2  Obesity, Class I, BMI 30-34.9 E66.9  Chronic back pain M54.9, G89.29  
 Generalized osteoarthritis of multiple sites M15.9  Bipolar affective disorder ([facility] [address] F31.9  Abnormally low high density lipoprotein (HDL) cholesterol with hypertriglyceridemia E78.6, E78.1  Diastolic dysfunction without heart failure I51.89  Chronic obstructive pulmonary disease (COPD) ([facility] [address] J44.9  Hypertensive heart disease without heart failure I11.9  Depression F32.9  History of back injury Z87.828  Type 2 diabetes mellitus with diabetic neuropathy, with long-term current use of insulin ([facility]) E11.40, Z79.4  Anxiety F41.9  Wears glasses Z97.3  History of hepatitis C Z86.19  
 Sickle cell trait ([facility]) D57.3  History of acute renal failure Z87.448  Hypothyroidism E03.9  Recurrent genital herpes A60.00  Sarcoidosis D86.9  Abscess of right arm L02.413  Hypoxemia requiring supplemental oxygen R09.02, Z99.81  
 Abnormal nuclear stress test R94.39  
 Cellulitis and abscess of hand L03.119, L02.519  
 Cellulitis and abscess of foot L03.119, L02.619  
 Cellulitis of arm, right L03. 113  
 Olecranon bursitis of right elbow M70.21  
 Contusion of left elbow S50. 02XA  Intravenous drug user F19.90  Right Achilles tendinitis M76.61  
 History of penicillin allergy Z88.0  Falls frequently R29.6  Gastroesophageal reflux disease with hiatal hernia K21.9, K44.9  Memory difficulty R41.3  Mixed connective tissue disease ([facility] [address] M35.1  Impaired mobility and ADLs Z74.09  
 Hyperuricemia E79.0  History of vitamin D deficiency Z86.39  
 Urge urinary incontinence N39.41  
  Spinal cord compression ([facility]) G95.20  Compression fracture of body of thoracic vertebra ([facility]) S22.000A  Status post laminectomy with spinal fusion Z98.1  Acute blood loss as cause of postoperative anemia D62  
 History of fracture due to fall Z87.81  Chronic respiratory failure with hypoxia ([facility]) J96.11  
 Cellulitis of right forearm L03. 113  
 Gout M10.9  Menopause Z78.0  Long term current use of aspirin Z79.82  
 Opioid dependence ([facility]) F11.20  Tobacco use disorder F17.200  Sepsis ([address] A41.9  Perforated viscus R19.8  Septic shock ([facility]) A41.9, R65.21 ABG: 
Date:3/12/2020 No results found for: PH, PHI, PCO2, PCO2I, PO2, PO2I, HCO3, HCO3I, FIO2, FIO2I Chest X-ray: 
Date:3/12/2020 [place] [facility] [place] 02/29/20 XR CHEST SNGL V  
 Narrative EXAM:  XR CHEST SNGL V 
 
INDICATION:   f/u respiratory failure COMPARISON: 3/8/2020 FINDINGS:  
 
NG tube removed. No new parenchymal opacities in either lung. Stable heart size. No pneumothorax or other acute changes. Impression IMPRESSION: 
 
Stable chest.  
 
 
Lab Test: 
Date:3/12/2020 WBC:  
Lab Results Component Value Date/Time WBC 7.5 03/12/2020 05:10 AM  
HGB:  
Lab Results Component Value Date/Time HGB 7.5 (L) 03/12/2020 05:10 AM  
 PLTS:  
Lab Results Component Value Date/Time PLATELET 096 70/04/9069 05:10 AM  
 
 
SaO2%/flow: @URHLYUQ9(2)@ Vital Signs:    
Patient Vitals for the past 8 hrs: 
 Temp Pulse Resp BP SpO2  
03/12/20 2056     94 % 03/12/20 2034 97.9 °F (36.6 °C) 65 18 124/76 100 % 03/12/20 1546 97.6 °F (36.4 °C) 79 18 133/69 99 % RA/O2 flow/device:3L NV First Inital Assesment:    
[x]Yes []No  
Reevaluation/Reassessment:   
[]Yes [x]No  
 
CHART REVIEW Points 0 X 1 X 2 X 3 X 4 X Points Pulmonary History Smoking History (1) none  Recent Smoking History <1 PPD x Recent Smoking History >1 PPD  Pulmonary Disease or Impairment  Severe Pulmonary Disease  1 Surgical History No Surgery  General Surgery  Lower Abdominal x Thoracic or Upper Abdominal  Thoracic & Pulmonary Disease  2 CXR Clear or not indicated  Chronic changes or CXR Pending x Infiltrate, atelectasis or pleural effusions  Infiltrates in more than 1lobe  Infiltrates +atelectasis +/or pleural effusions  1 PATIENT ASSESSMENT  
 0 X 1 X 2 X 3 X 4 X Points Respiratory Pattern Regular pattern RR 12-20 x Increased RR 21-27   Mild Dyspnea at rest, irregular pattern RR 28-32  Moderate Dyspnea at rest, Use of accessory muscles, RR 33-36  Severe Dyspnea, Use of accessory muscles RR >36  0 Mental Status Alert Oriented cooperative x Confused, Follows commands  Lethargic, Does not follow commands  Obtunded  Unresponsive  0 Breath Sounds Clear  Decreased Unilaterally  Decreased Bilaterally  Mild Scattered wheezing or Crackles in bases x Severe Wheezing or rhonchi  3 Cough Strong dry NPC  Strong Productive  Weak NPC x Weak productive or weak with rhonchi  No cough or may require suctioning  2 Level of Activity Ambulatory  Ambulatory with assistance  Temporarily Non-ambulatory x Non-Ambulatory, able to position self  Unable to position self, confined to bed  2 Total Points/Score:   11 Specific Intervention Chart(bronchial hygiene) Bronchial Hygiene/Secretion Clearance:   
[]EZPAP []Rotation bed with vibration []CPT with percussor []CPT via vest  
[x]Oscillastiang positive pressure expiratory device Lung Expansion:   
[]Incentive Spirometer w/RT visits []Incentive Spirometer w/nursing []EZPAP *Suctioning:   
[]Nasal Tracheal []Tracheal   
 *suctioning will be ordered and done PRN with an associated frequency such as QID/PRN based on score Other:   
Care Plan Level # Score Modality Frequency Comment Level 1 >17 0 0 Level 2 14-17 0 0 Level 3 10-13 PEP QID Level 4 1-9 0 0 BRONCHIAL HYGIENE SCORING AND FREQUENCY GUIDELINES  
 Frequency Indications/Findings Level # Q4 ATC Copious secretions, SOB, unable to sleep 1 QID & PRN at night Moderate amounts of secretions 2  
TID or Q6 wa Small amounts of secretions and poor cough: recent history of secretions 3 BID or Q8 wa Unable to deep breathe and cough effectively 4 Comments:  PEP Respiratory Therapist: Shani Phillips, RT

## 2020-03-14 NOTE — PROGRESS NOTES
1000: lab unable to draw ordered labs on patient due to edema in upper extremities.  notified, received instruction to elevate patient bilateral extremities and then retry lab draw. 1530: Patient's bilateral upper extremity edema decreasing. Lab called to retry lab draw. Lab draw successful. 1716: Received labs results to Metabolic Panel, Potassium level 3.1. Dr. Anamika Duffy notified. Patient took Potassium 40 meq orally. Labs to be redrawn in AM. Patient asymptomatic.

## 2020-03-14 NOTE — PROGRESS NOTES
Problem: Diabetes Self-Management Goal: *Disease process and treatment process Description: Define diabetes and identify own type of diabetes; list 3 options for treating diabetes. Outcome: Progressing Towards Goal 
Goal: *Incorporating nutritional management into lifestyle Description: Describe effect of type, amount and timing of food on blood glucose; list 3 methods for planning meals. Outcome: Progressing Towards Goal 
Goal: *Incorporating physical activity into lifestyle Description: State effect of exercise on blood glucose levels. Outcome: Progressing Towards Goal 
Goal: *Developing strategies to promote health/change behavior Description: Define the ABC's of diabetes; identify appropriate screenings, schedule and personal plan for screenings. Outcome: Progressing Towards Goal 
Goal: *Using medications safely Description: State effect of diabetes medications on diabetes; name diabetes medication taking, action and side effects. Outcome: Progressing Towards Goal 
Goal: *Monitoring blood glucose, interpreting and using results Description: Identify recommended blood glucose targets  and personal targets. Outcome: Progressing Towards Goal 
Goal: *Prevention, detection, treatment of acute complications Description: List symptoms of hyper- and hypoglycemia; describe how to treat low blood sugar and actions for lowering  high blood glucose level. Outcome: Progressing Towards Goal 
Goal: *Prevention, detection and treatment of chronic complications Description: Define the natural course of diabetes and describe the relationship of blood glucose levels to long term complications of diabetes. Outcome: Progressing Towards Goal 
Goal: *Developing strategies to address psychosocial issues Description: Describe feelings about living with diabetes; identify support needed and support network Outcome: Progressing Towards Goal 
Goal: *Insulin pump training Outcome: Progressing Towards Goal 
 Goal: *Sick day guidelines Outcome: Progressing Towards Goal 
Goal: *Patient Specific Goal (EDIT GOAL, INSERT TEXT) Outcome: Progressing Towards Goal 
  
Problem: Patient Education: Go to Patient Education Activity Goal: Patient/Family Education Outcome: Progressing Towards Goal 
  
Problem: Falls - Risk of 
Goal: *Absence of Falls Description: Document Elida Etienne Fall Risk and appropriate interventions in the flowsheet. Outcome: Progressing Towards Goal 
Note: Fall Risk Interventions: 
Mobility Interventions: Bed/chair exit alarm Mentation Interventions: Adequate sleep, hydration, pain control Medication Interventions: Bed/chair exit alarm Elimination Interventions: Bed/chair exit alarm History of Falls Interventions: Bed/chair exit alarm Problem: Patient Education: Go to Patient Education Activity Goal: Patient/Family Education Outcome: Progressing Towards Goal 
  
Problem: Pressure Injury - Risk of 
Goal: *Prevention of pressure injury Description: Document Florian Scale and appropriate interventions in the flowsheet. Outcome: Progressing Towards Goal 
Note: Pressure Injury Interventions: 
Sensory Interventions: Assess changes in LOC Moisture Interventions: Absorbent underpads Activity Interventions: Pressure redistribution bed/mattress(bed type) Mobility Interventions: Assess need for specialty bed Nutrition Interventions: Document food/fluid/supplement intake Friction and Shear Interventions: Apply protective barrier, creams and emollients Problem: Patient Education: Go to Patient Education Activity Goal: Patient/Family Education Outcome: Progressing Towards Goal 
  
Problem: Nutrition Deficit Goal: *Optimize nutritional status Outcome: Progressing Towards Goal 
  
Problem: Surgical Wound Care Goal: *Non-infected Wound: Absence of infection signs and symptoms Description: Infection control procedures (eg: clean dressings, clean gloves, hand washing, precautions to isolate wound from contamination, sterile instruments used for wound debridement) should be implemented. Outcome: Progressing Towards Goal 
Goal: *Infected Wound: Prevention of further infection and promotion of healing Description: Consider the use of systemic antibiotics in patients with cellulitis, osteomyelitis, bacteremia, or sepsis if there are no contraindications. Outcome: Progressing Towards Goal 
Goal: *Improvement of existing wound and maintenance of skin integrity Outcome: Progressing Towards Goal 
  
Problem: Injury - Risk of, Adverse Drug Event Goal: *Absence of adverse drug events Outcome: Progressing Towards Goal 
Goal: *Absence of medication errors Outcome: Progressing Towards Goal 
Goal: *Knowledge of prescribed medications Outcome: Progressing Towards Goal 
  
Problem: Patient Education: Go to Patient Education Activity Goal: Patient/Family Education Outcome: Progressing Towards Goal 
  
Problem: Patient Education: Go to Patient Education Activity Goal: Patient/Family Education Outcome: Progressing Towards Goal 
  
Problem: Patient Education: Go to Patient Education Activity Goal: Patient/Family Education Outcome: Progressing Towards Goal 
  
Problem: Patient Education: Go to Patient Education Activity Goal: Patient/Family Education Outcome: Progressing Towards Goal 
  
Problem: Patient Education: Go to Patient Education Activity Goal: Patient/Family Education Outcome: Progressing Towards Goal 
  
Problem: Diabetes Maintenance:Admission Goal: *Blood glucose 80 to 180 mg/dl Outcome: Progressing Towards Goal

## 2020-03-14 NOTE — ROUTINE PROCESS
Received bedside report from [facility], patient was resting in bed, HOB elevated, bed in lowest position and oriented to call button. Gave bedside report to Lili Izquierdo RN, using SBAR, MAR, and Kardex.

## 2020-03-14 NOTE — PROGRESS NOTES
Nutrition: 
 
Pt has not consumed Ensure yet, but plans to, unable to assess tolerance. Reviewed labs, phos remains low, but is receiving oral replacement. K now wnl. Ionized Ca low, consider calcium supplementation as appropriate per MD.  Will continue to follow. Recommendation: Consider calcium supplementation.  
 
Nathan Grayson, RD

## 2020-03-14 NOTE — ROUTINE PROCESS
Bedside and Verbal shift change report given to Jeaneth (oncoming nurse) by Claudette Siren (offgoing nurse). Report included the following information SBAR, Kardex, Intake/Output, MAR, Recent Results and Alarm Parameters .

## 2020-03-14 NOTE — PROGRESS NOTES
[facility] Hospitalist Group Progress Note Patient: Rosemary Lozano Age: 62 y.o. : 1955 MR#: [id number] SSN: [id number] Date/Time: 3/14/2020 12:14 PM 
 
Subjective/24-hour events:  
 
Refusing oral phos supplementation and nystatin swish. Denies SOB, no chest pain. Afebrile overnight. Assessment:  
Sigmoid perforation with resulting peritonitis s/p emergent ex-lap 20 with re-exploration laparotomy and drainage of intra-abdominal stool collection, descending colectomy and transverse colostomy 3/1/20 Prevotella Sepsis with septic shock secondary to above - POA 
E. Coli UTI/cystitis Acute on chronic respiratory failure Acute on chronic diastolic CHF Paroxysmal atrial fibrillation Pulmonary HTN 
COPD with acute exacerbation Sarcoidosis on chronic prednisone therapy Electrolyte abnormalities:  Hypokalemia, hypophosphatemia DM 2 Recent T9-T10 compression fracture with spinal cord compression s/p T-spine laminectomy with fusion Paraparesis, secondary to above - patient is wheelchair bound ELIF resolved Polysubstance abuse hx Plan: 
Continue antibiotics per ID. D/C PO phos, give IV supplementation. Monitor potassium and replace as needed. Trend magnesium. Follow H/H - stable, no need for transfusion. PT/OT/SLP as tolerated. Disposition TBD. Anticipate that patient will need placement. Case discussed with:  [x]Patient  []Family  []Nursing  []Case Management DVT Prophylaxis:  [x]Eliquis  []Hep SQ  []SCDs  []Coumadin   []On Heparin gtt Objective:  
VS:  
Visit Vitals /77 (BP 1 Location: Left arm, BP Patient Position: At rest) Pulse 81 Temp 98.9 °F (37.2 °C) Resp 18 Ht 5' 4\" (1.626 m) Wt 91.6 kg (201 lb 14.4 oz) LMP 2012 (Exact Date) SpO2 100% BMI 34.66 kg/m² Tmax/24hrs: Temp (24hrs), Av.1 °F (36.7 °C), Min:97.4 °F (36.3 °C), Max:98.9 °F (37.2 °C) Intake/Output Summary (Last 24 hours) at 3/14/2020 1214 Last data filed at 3/14/2020 1115 Gross per 24 hour Intake 580 ml Output 750 ml Net -170 ml General:  In NAD. Nontoxic-appearing. Cardiovascular:  RRR. Pulmonary:  No wheezes. Effort nonlabored. GI:  Abdomen soft, NTTP. Extremities:  Warm, no ischemia. Neuro:  Awake/alert. Labs:   
Recent Results (from the past 24 hour(s)) GLUCOSE, POC Collection Time: 03/13/20  4:05 PM  
Result Value Ref Range Glucose (POC) 140 (H) 70 - 110 mg/dL METABOLIC PANEL, BASIC Collection Time: 03/13/20  5:16 PM  
Result Value Ref Range Sodium 136 136 - 145 mmol/L Potassium 3.1 (L) 3.5 - 5.5 mmol/L Chloride 102 100 - 111 mmol/L  
 CO2 28 21 - 32 mmol/L Anion gap 6 3.0 - 18 mmol/L Glucose 125 (H) 74 - 99 mg/dL BUN 5 (L) 7.0 - 18 MG/DL Creatinine 0.42 (L) 0.6 - 1.3 MG/DL  
 BUN/Creatinine ratio 12 12 - 20 GFR est AA >60 >60 ml/min/1.73m2 GFR est non-AA >60 >60 ml/min/1.73m2 Calcium 7.5 (L) 8.5 - 10.1 MG/DL MAGNESIUM Collection Time: 03/13/20  5:16 PM  
Result Value Ref Range Magnesium 1.5 (L) 1.6 - 2.6 mg/dL PHOSPHORUS Collection Time: 03/13/20  5:16 PM  
Result Value Ref Range Phosphorus 2.9 2.5 - 4.9 MG/DL  
CALCIUM, IONIZED Collection Time: 03/13/20  5:16 PM  
Result Value Ref Range Ionized Calcium 1.06 (L) 1.12 - 1.32 MMOL/L  
GLUCOSE, POC Collection Time: 03/13/20  9:40 PM  
Result Value Ref Range Glucose (POC) 209 (H) 70 - 110 mg/dL MAGNESIUM Collection Time: 03/14/20  3:24 AM  
Result Value Ref Range Magnesium 1.5 (L) 1.6 - 2.6 mg/dL PHOSPHORUS Collection Time: 03/14/20  3:24 AM  
Result Value Ref Range Phosphorus 2.4 (L) 2.5 - 4.9 MG/DL  
METABOLIC PANEL, COMPREHENSIVE Collection Time: 03/14/20  3:24 AM  
Result Value Ref Range Sodium 140 136 - 145 mmol/L Potassium 3.5 3.5 - 5.5 mmol/L Chloride 105 100 - 111 mmol/L  
 CO2 29 21 - 32 mmol/L Anion gap 6 3.0 - 18 mmol/L Glucose 112 (H) 74 - 99 mg/dL BUN 6 (L) 7.0 - 18 MG/DL Creatinine 0.47 (L) 0.6 - 1.3 MG/DL  
 BUN/Creatinine ratio 13 12 - 20 GFR est AA >60 >60 ml/min/1.73m2 GFR est non-AA >60 >60 ml/min/1.73m2 Calcium 7.8 (L) 8.5 - 10.1 MG/DL Bilirubin, total 0.5 0.2 - 1.0 MG/DL  
 ALT (SGPT) 15 13 - 56 U/L  
 AST (SGOT) 13 10 - 38 U/L Alk. phosphatase 48 45 - 117 U/L Protein, total 5.8 (L) 6.4 - 8.2 g/dL Albumin 1.4 (L) 3.4 - 5.0 g/dL Globulin 4.4 (H) 2.0 - 4.0 g/dL A-G Ratio 0.3 (L) 0.8 - 1.7    
CBC WITH AUTOMATED DIFF Collection Time: 03/14/20  3:24 AM  
Result Value Ref Range WBC 9.2 4.6 - 13.2 K/uL  
 RBC 3.01 (L) 4.20 - 5.30 M/uL HGB 8.1 (L) 12.0 - 16.0 g/dL HCT 25.2 (L) 35.0 - 45.0 % MCV 83.7 74.0 - 97.0 FL  
 MCH 26.9 24.0 - 34.0 PG  
 MCHC 32.1 31.0 - 37.0 g/dL  
 RDW 16.6 (H) 11.6 - 14.5 % PLATELET 123 (H) 332 - 420 K/uL MPV 10.1 9.2 - 11.8 FL  
 NEUTROPHILS 80 (H) 42 - 75 % LYMPHOCYTES 16 (L) 20 - 51 % MONOCYTES 4 2 - 9 % EOSINOPHILS 0 0 - 5 % BASOPHILS 0 0 - 3 %  
 ABS. NEUTROPHILS 7.3 1.8 - 8.0 K/UL  
 ABS. LYMPHOCYTES 1.5 0.8 - 3.5 K/UL  
 ABS. MONOCYTES 0.4 0 - 1.0 K/UL  
 ABS. EOSINOPHILS 0.0 0.0 - 0.4 K/UL  
 ABS. BASOPHILS 0.0 0.0 - 0.06 K/UL  
 DF MANUAL PLATELET COMMENTS Increased Platelets RBC COMMENTS ANISOCYTOSIS 1+ 
    
 RBC COMMENTS HYPOCHROMIA 1+ 
    
 RBC COMMENTS TARGET CELLS 1+ GLUCOSE, POC Collection Time: 03/14/20  7:24 AM  
Result Value Ref Range Glucose (POC) 98 70 - 110 mg/dL GLUCOSE, POC Collection Time: 03/14/20 11:07 AM  
Result Value Ref Range Glucose (POC) 127 (H) 70 - 110 mg/dL Signed By: Sheron Louise MD   
 March 14, 2020 12:14 PM

## 2020-03-15 NOTE — PROGRESS NOTES
Problem: Diabetes Self-Management Goal: *Disease process and treatment process Description: Define diabetes and identify own type of diabetes; list 3 options for treating diabetes. Outcome: Progressing Towards Goal 
Goal: *Incorporating nutritional management into lifestyle Description: Describe effect of type, amount and timing of food on blood glucose; list 3 methods for planning meals. Outcome: Progressing Towards Goal 
Goal: *Incorporating physical activity into lifestyle Description: State effect of exercise on blood glucose levels. Outcome: Progressing Towards Goal 
Goal: *Developing strategies to promote health/change behavior Description: Define the ABC's of diabetes; identify appropriate screenings, schedule and personal plan for screenings. Outcome: Progressing Towards Goal 
Goal: *Using medications safely Description: State effect of diabetes medications on diabetes; name diabetes medication taking, action and side effects. Outcome: Progressing Towards Goal 
Goal: *Monitoring blood glucose, interpreting and using results Description: Identify recommended blood glucose targets  and personal targets. Outcome: Progressing Towards Goal 
Goal: *Prevention, detection, treatment of acute complications Description: List symptoms of hyper- and hypoglycemia; describe how to treat low blood sugar and actions for lowering  high blood glucose level. Outcome: Progressing Towards Goal 
Goal: *Prevention, detection and treatment of chronic complications Description: Define the natural course of diabetes and describe the relationship of blood glucose levels to long term complications of diabetes. Outcome: Progressing Towards Goal 
Goal: *Developing strategies to address psychosocial issues Description: Describe feelings about living with diabetes; identify support needed and support network Outcome: Progressing Towards Goal 
Goal: *Insulin pump training Outcome: Progressing Towards Goal 
 Goal: *Sick day guidelines Outcome: Progressing Towards Goal 
Goal: *Patient Specific Goal (EDIT GOAL, INSERT TEXT) Outcome: Progressing Towards Goal 
  
Problem: Patient Education: Go to Patient Education Activity Goal: Patient/Family Education Outcome: Progressing Towards Goal 
  
Problem: Falls - Risk of 
Goal: *Absence of Falls Description: Document Kedar Bartlett Fall Risk and appropriate interventions in the flowsheet. Outcome: Progressing Towards Goal 
Note: Fall Risk Interventions: 
Mobility Interventions: Assess mobility with egress test, Bed/chair exit alarm, Communicate number of staff needed for ambulation/transfer, OT consult for ADLs, Patient to call before getting OOB, PT Consult for mobility concerns, PT Consult for assist device competence Mentation Interventions: Adequate sleep, hydration, pain control, Bed/chair exit alarm, Door open when patient unattended, Evaluate medications/consider consulting pharmacy, Eyeglasses and hearing aids, More frequent rounding, Reorient patient, Room close to nurse's station, Toileting rounds, Update white board Medication Interventions: Bed/chair exit alarm, Evaluate medications/consider consulting pharmacy, Patient to call before getting OOB, Teach patient to arise slowly Elimination Interventions: Bed/chair exit alarm, Call light in reach, Patient to call for help with toileting needs, Stay With Me (per policy), Toilet paper/wipes in reach, Toileting schedule/hourly rounds History of Falls Interventions: Bed/chair exit alarm, Consult care management for discharge planning, Door open when patient unattended, Evaluate medications/consider consulting pharmacy, Room close to nurse's station, Assess for delayed presentation/identification of injury for 48 hrs (comment for end date) Problem: Patient Education: Go to Patient Education Activity Goal: Patient/Family Education Outcome: Progressing Towards Goal 
  
Problem: Pressure Injury - Risk of 
 Goal: *Prevention of pressure injury Description: Document Florian Scale and appropriate interventions in the flowsheet. Outcome: Progressing Towards Goal 
Note: Pressure Injury Interventions: 
Sensory Interventions: Assess changes in LOC, Assess need for specialty bed, Avoid rigorous massage over bony prominences, Chair cushion, Check visual cues for pain, Discuss PT/OT consult with provider, Float heels, Keep linens dry and wrinkle-free, Maintain/enhance activity level, Minimize linen layers, Monitor skin under medical devices, Pressure redistribution bed/mattress (bed type), Turn and reposition approx. every two hours (pillows and wedges if needed), Suspension boots Moisture Interventions: Absorbent underpads, Apply protective barrier, creams and emollients, Assess need for specialty bed, Check for incontinence Q2 hours and as needed, Internal/External urinary devices, Limit adult briefs, Internal/External fecal devices, Contain wound drainage, Moisture barrier, Minimize layers, Maintain skin hydration (lotion/cream) Activity Interventions: Assess need for specialty bed, Pressure redistribution bed/mattress(bed type), PT/OT evaluation Mobility Interventions: Assess need for specialty bed, Float heels, HOB 30 degrees or less, Pressure redistribution bed/mattress (bed type), PT/OT evaluation, Turn and reposition approx. every two hours(pillow and wedges) Nutrition Interventions: Document food/fluid/supplement intake, Discuss nutritional consult with provider, Offer support with meals,snacks and hydration Friction and Shear Interventions: Apply protective barrier, creams and emollients, Foam dressings/transparent film/skin sealants, HOB 30 degrees or less, Lift team/patient mobility team, Minimize layers, Transferring/repositioning devices Problem: Patient Education: Go to Patient Education Activity Goal: Patient/Family Education Outcome: Progressing Towards Goal 
  
 Problem: Nutrition Deficit Goal: *Optimize nutritional status Outcome: Progressing Towards Goal 
  
Problem: Surgical Wound Care Goal: *Non-infected Wound: Absence of infection signs and symptoms Description: Infection control procedures (eg: clean dressings, clean gloves, hand washing, precautions to isolate wound from contamination, sterile instruments used for wound debridement) should be implemented. Outcome: Progressing Towards Goal 
Goal: *Infected Wound: Prevention of further infection and promotion of healing Description: Consider the use of systemic antibiotics in patients with cellulitis, osteomyelitis, bacteremia, or sepsis if there are no contraindications. Outcome: Progressing Towards Goal 
Goal: *Improvement of existing wound and maintenance of skin integrity Outcome: Progressing Towards Goal 
  
Problem: Patient Education: Go to Patient Education Activity Goal: Patient/Family Education Outcome: Progressing Towards Goal 
  
Problem: Injury - Risk of, Adverse Drug Event Goal: *Absence of adverse drug events Outcome: Progressing Towards Goal 
Goal: *Absence of medication errors Outcome: Progressing Towards Goal 
Goal: *Knowledge of prescribed medications Outcome: Progressing Towards Goal 
  
Problem: Patient Education: Go to Patient Education Activity Goal: Patient/Family Education Outcome: Progressing Towards Goal 
  
Problem: Patient Education: Go to Patient Education Activity Goal: Patient/Family Education Outcome: Progressing Towards Goal 
  
Problem: Patient Education: Go to Patient Education Activity Goal: Patient/Family Education Outcome: Progressing Towards Goal 
  
Problem: Patient Education: Go to Patient Education Activity Goal: Patient/Family Education Outcome: Progressing Towards Goal 
  
Problem: Diabetes Maintenance:Admission Goal: *Blood glucose 80 to 180 mg/dl Outcome: Progressing Towards Goal

## 2020-03-15 NOTE — PROGRESS NOTES
Problem: Diabetes Self-Management Goal: *Disease process and treatment process Description: Define diabetes and identify own type of diabetes; list 3 options for treating diabetes. Outcome: Progressing Towards Goal 
Goal: *Incorporating nutritional management into lifestyle Description: Describe effect of type, amount and timing of food on blood glucose; list 3 methods for planning meals. Outcome: Progressing Towards Goal 
Goal: *Incorporating physical activity into lifestyle Description: State effect of exercise on blood glucose levels. Outcome: Progressing Towards Goal 
Goal: *Developing strategies to promote health/change behavior Description: Define the ABC's of diabetes; identify appropriate screenings, schedule and personal plan for screenings. Outcome: Progressing Towards Goal 
Goal: *Using medications safely Description: State effect of diabetes medications on diabetes; name diabetes medication taking, action and side effects. Outcome: Progressing Towards Goal 
Goal: *Monitoring blood glucose, interpreting and using results Description: Identify recommended blood glucose targets  and personal targets. Outcome: Progressing Towards Goal 
Goal: *Prevention, detection, treatment of acute complications Description: List symptoms of hyper- and hypoglycemia; describe how to treat low blood sugar and actions for lowering  high blood glucose level. Outcome: Progressing Towards Goal 
Goal: *Prevention, detection and treatment of chronic complications Description: Define the natural course of diabetes and describe the relationship of blood glucose levels to long term complications of diabetes. Outcome: Progressing Towards Goal 
Goal: *Developing strategies to address psychosocial issues Description: Describe feelings about living with diabetes; identify support needed and support network Outcome: Progressing Towards Goal 
  
Problem: Falls - Risk of 
Goal: *Absence of Falls Description: Document Nancy Barron Fall Risk and appropriate interventions in the flowsheet. Outcome: Progressing Towards Goal 
Note: Fall Risk Interventions: 
Mobility Interventions: Bed/chair exit alarm, Patient to call before getting OOB Mentation Interventions: Adequate sleep, hydration, pain control, Evaluate medications/consider consulting pharmacy, More frequent rounding Medication Interventions: Bed/chair exit alarm Elimination Interventions: Bed/chair exit alarm History of Falls Interventions: Bed/chair exit alarm Problem: Pressure Injury - Risk of 
Goal: *Prevention of pressure injury Description: Document Florian Scale and appropriate interventions in the flowsheet. Outcome: Progressing Towards Goal 
Note: Pressure Injury Interventions: 
Sensory Interventions: Assess changes in LOC, Assess need for specialty bed, Avoid rigorous massage over bony prominences Moisture Interventions: Apply protective barrier, creams and emollients, Absorbent underpads Activity Interventions: Pressure redistribution bed/mattress(bed type) Mobility Interventions: Assess need for specialty bed, Pressure redistribution bed/mattress (bed type) Nutrition Interventions: Document food/fluid/supplement intake Friction and Shear Interventions: Apply protective barrier, creams and emollients, Minimize layers Problem: Nutrition Deficit Goal: *Optimize nutritional status Outcome: Progressing Towards Goal

## 2020-03-15 NOTE — ROUTINE PROCESS
Bedside and Verbal shift change report given to Rodriguez (oncoming nurse) by Beverly Vizcarra (offgoing nurse). Report included the following information SBAR, Kardex, Intake/Output, MAR, Recent Results and Alarm Parameters .

## 2020-03-15 NOTE — ROUTINE PROCESS
Bedside and Verbal shift change report given to Rodriguez (oncoming nurse) by Nguyen Isaac (offgoing nurse). Report included the following information SBAR, Kardex, Intake/Output, MAR, Recent Results and Alarm Parameters .

## 2020-03-15 NOTE — PROGRESS NOTES
[facility] Hospitalist Group Progress Note Patient: Airam Valadez Age: 62 y.o. : 1955 MR#: [id number] SSN: [id number] Date: 3/15/2020 Subjective/24-hour events:  
 
Mouth irritation a little better today. Feels more hungry. Assessment:  
Sigmoid perforation with resulting peritonitis s/p emergent ex-lap 20 with re-exploration laparotomy and drainage of intra-abdominal stool collection, descending colectomy and transverse colostomy 3/1/20 Prevotella Sepsis with septic shock secondary to above - POA 
E. Coli UTI/cystitis Acute on chronic respiratory failure Acute on chronic diastolic CHF Paroxysmal atrial fibrillation Pulmonary HTN 
COPD with acute exacerbation Sarcoidosis on chronic prednisone therapy Electrolyte abnormalities:  Hypokalemia, hypophosphatemia DM 2 Recent T9-T10 compression fracture with spinal cord compression s/p T-spine laminectomy with fusion Paraparesis, secondary to above - patient is wheelchair bound ELIF resolved Polysubstance abuse hx Plan: Antibiotics as ordered. ID f/u again tomorrow. Replace magnesium and potassium IV today and continue to trend. PT/OT/SLP. Follow. Case discussed with:  [x]Patient  []Family  []Nursing  []Case Management DVT Prophylaxis:  [x]Eliquis  []Hep SQ  []SCDs  []Coumadin   []On Heparin gtt Objective:  
VS:  
Visit Vitals /75 (BP 1 Location: Left arm, BP Patient Position: At rest) Pulse 71 Temp 99.1 °F (37.3 °C) Resp 18 Ht 5' 4\" (1.626 m) Wt 84.3 kg (185 lb 13.6 oz) LMP 2012 (Exact Date) SpO2 99% BMI 31.90 kg/m² Tmax/24hrs: Temp (24hrs), Av.4 °F (36.9 °C), Min:97.5 °F (36.4 °C), Max:99.1 °F (37.3 °C) Intake/Output Summary (Last 24 hours) at 3/15/2020 1325 Last data filed at 3/15/2020 1113 Gross per 24 hour Intake 1160 ml Output 2900 ml Net -1740 ml General:  In NAD. Nontoxic-appearing. Cardiovascular:  RRR. Pulmonary:  No wheezes. Effort nonlabored. GI:  Abdomen soft, NTTP. Extremities:  Warm, no ischemia. Neuro:  Awake/alert. Labs:   
Recent Results (from the past 24 hour(s)) GLUCOSE, POC Collection Time: 03/14/20  4:02 PM  
Result Value Ref Range Glucose (POC) 184 (H) 70 - 110 mg/dL GLUCOSE, POC Collection Time: 03/14/20  9:31 PM  
Result Value Ref Range Glucose (POC) 222 (H) 70 - 110 mg/dL MAGNESIUM Collection Time: 03/15/20  2:26 AM  
Result Value Ref Range Magnesium 1.2 (L) 1.6 - 2.6 mg/dL PHOSPHORUS Collection Time: 03/15/20  2:26 AM  
Result Value Ref Range Phosphorus 2.5 2.5 - 4.9 MG/DL  
METABOLIC PANEL, COMPREHENSIVE Collection Time: 03/15/20  2:26 AM  
Result Value Ref Range Sodium 138 136 - 145 mmol/L Potassium 3.1 (L) 3.5 - 5.5 mmol/L Chloride 100 100 - 111 mmol/L  
 CO2 32 21 - 32 mmol/L Anion gap 6 3.0 - 18 mmol/L Glucose 130 (H) 74 - 99 mg/dL BUN 6 (L) 7.0 - 18 MG/DL Creatinine 0.49 (L) 0.6 - 1.3 MG/DL  
 BUN/Creatinine ratio 12 12 - 20 GFR est AA >60 >60 ml/min/1.73m2 GFR est non-AA >60 >60 ml/min/1.73m2 Calcium 7.9 (L) 8.5 - 10.1 MG/DL Bilirubin, total 0.4 0.2 - 1.0 MG/DL  
 ALT (SGPT) 13 13 - 56 U/L  
 AST (SGOT) 12 10 - 38 U/L Alk. phosphatase 52 45 - 117 U/L Protein, total 5.9 (L) 6.4 - 8.2 g/dL Albumin 1.5 (L) 3.4 - 5.0 g/dL Globulin 4.4 (H) 2.0 - 4.0 g/dL A-G Ratio 0.3 (L) 0.8 - 1.7    
CBC WITH AUTOMATED DIFF Collection Time: 03/15/20  2:26 AM  
Result Value Ref Range WBC 9.9 4.6 - 13.2 K/uL  
 RBC 3.19 (L) 4.20 - 5.30 M/uL HGB 8.4 (L) 12.0 - 16.0 g/dL HCT 26.3 (L) 35.0 - 45.0 % MCV 82.4 74.0 - 97.0 FL  
 MCH 26.3 24.0 - 34.0 PG  
 MCHC 31.9 31.0 - 37.0 g/dL  
 RDW 16.4 (H) 11.6 - 14.5 % PLATELET 077 048 - 027 K/uL MPV 9.6 9.2 - 11.8 FL  
 NEUTROPHILS 74 42 - 75 % BAND NEUTROPHILS 8 (H) 0 - 5 % LYMPHOCYTES 7 (L) 20 - 51 % MONOCYTES 10 (H) 2 - 9 % EOSINOPHILS 0 0 - 5 % BASOPHILS 0 0 - 3 % MYELOCYTES 1 (H) 0 %  
 ABS. NEUTROPHILS 8.1 (H) 1.8 - 8.0 K/UL  
 ABS. LYMPHOCYTES 0.7 (L) 0.8 - 3.5 K/UL  
 ABS. MONOCYTES 1.0 0 - 1.0 K/UL  
 ABS. EOSINOPHILS 0.0 0.0 - 0.4 K/UL  
 ABS. BASOPHILS 0.0 0.0 - 0.06 K/UL  
 DF AUTOMATED PLATELET COMMENTS ADEQUATE PLATELETS    
 RBC COMMENTS ANISOCYTOSIS 
1+ CALCIUM, IONIZED Collection Time: 03/15/20  2:26 AM  
Result Value Ref Range Ionized Calcium 1.13 1.12 - 1.32 MMOL/L  
GLUCOSE, POC Collection Time: 03/15/20  7:17 AM  
Result Value Ref Range Glucose (POC) 115 (H) 70 - 110 mg/dL GLUCOSE, POC Collection Time: 03/15/20 11:09 AM  
Result Value Ref Range Glucose (POC) 133 (H) 70 - 110 mg/dL Signed By: Paola Zambrano MD   
 March 15, 2020

## 2020-03-15 NOTE — ROUTINE PROCESS
Bedside and Verbal shift change report given to Jeaneth (oncoming nurse) by Joye Turner (offgoing nurse). Report included the following information SBAR, Kardex, Intake/Output, MAR, Recent Results, Cardiac Rhythm , and Alarm Parameters .

## 2020-03-15 NOTE — ROUTINE PROCESS
1915 Assumed care of patient from off going nurse. Patient resting in bed. No distress noted. Call bell within reach, siderails up x 3, bed in lowest position, and patient instructed to use call bell for assistance. Will continue to monitor. 1143 Bedside and Verbal shift change report given to Pau Salgado RN (oncoming nurse) by Ricardo Guerra RN(offgoing nurse).  Report included the following information SBAR, Intake/Output, MAR, Recent Results and Cardiac Rhythm SR.

## 2020-03-16 NOTE — PROGRESS NOTES
[facility]. Pulmonary, Critical Care, and Sleep Medicine Name: Michelet Barnett MRN: [id number] : 1955 Hospital: [address] [place] [address] Dr Date: 3/16/2020  Admission Date: 2020 Chart and notes reviewed. Data reviewed. I have evaluated all findings. [x]I have reviewed the flowsheet and previous days notes. Interval HPI:Patient is a 62 y. o. female with PMH of COPD (on 3LPM NC home O2), Sarcoidosis, HTN, T2DM,  Hep C, recent Spine Thoracic Laminectomy T6-T11 with Fusion (20) 2/2 compression fx of T9-T10 and spinal cord compression at T9 and T10 and spinal cord edema with subsequent paraparesis, who presented to [facility] ED on 20 c/o acute abdominal pain, SOB with associated constipation x4-5 days PTA, likely 2/2 aforementioned spinal surgery on 19. CT Chest/Abd/Pelvis showed intraperitoneal free air consistent with perforated viscus. Required intubation, Underwent emergent ex lap , was found to have sigmoid colon perforation, 2/2 constipation w/ large stool burden spilling into the peritoneum per Dr. Viviana Johnson. Dr. Viviana Johnson took pt back to OR on 3/1 for abdominal washout and closure. ICU stay complicated by persistent vasopressor requirement and afib. Subjective 
 
 20 Overall doing well. On home O2 requirements. Cough improving ROS:Review of systems not obtained due to patient factors. Events and notes from last 24 hours reviewed. Patient Active Problem List  
Diagnosis Code  Diabetic neuropathy associated with type 2 diabetes mellitus (HCC) E11.40  Venous insufficiency I87.2  Obesity, Class I, BMI 30-34.9 E66.9  Chronic back pain M54.9, G89.29  
 Generalized osteoarthritis of multiple sites M15.9  Bipolar affective disorder ([facility] F31.9  Abnormally low high density lipoprotein (HDL) cholesterol with hypertriglyceridemia E78.6, E78.1  Diastolic dysfunction without heart failure I51.89  
  Chronic obstructive pulmonary disease (COPD) ([facility] [address] [facility] J44.9  Hypertensive heart disease without heart failure I11.9  Depression F32.9  History of back injury Z87.828  Type 2 diabetes mellitus with diabetic neuropathy, with long-term current use of insulin ([facility]) E11.40, Z79.4  Anxiety F41.9  Wears glasses Z97.3  History of hepatitis C Z86.19  
 Sickle cell trait ([facility]) D57.3  History of acute renal failure Z87.448  Hypothyroidism E03.9  Recurrent genital herpes A60.00  Sarcoidosis D86.9  Abscess of right arm L02.413  Hypoxemia requiring supplemental oxygen R09.02, Z99.81  
 Abnormal nuclear stress test R94.39  
 Cellulitis and abscess of hand L03.119, L02.519  
 Cellulitis and abscess of foot L03.119, L02.619  
 Cellulitis of arm, right L03. 113  
 Olecranon bursitis of right elbow M70.21  
 Contusion of left elbow S50. 02XA  Intravenous drug user F19.90  Right Achilles tendinitis M76.61  
 History of penicillin allergy Z88.0  Falls frequently R29.6  Gastroesophageal reflux disease with hiatal hernia K21.9, K44.9  Memory difficulty R41.3  [facility] M35.1  Impaired mobility and ADLs Z74.09  
 Hyperuricemia E79.0  History of vitamin D deficiency Z86.39  
 Urge urinary incontinence N39.41  Spinal cord compression ([facility]) G95.20  Compression fracture of body of thoracic vertebra ([facility]) S22.000A  Status post laminectomy with spinal fusion Z98.1  Acute blood loss as cause of postoperative anemia D62  
 History of fracture due to fall Z87.81  Chronic respiratory failure with hypoxia ([facility]) J96.11  
 Cellulitis of right forearm L03. 113  
 Gout M10.9  Menopause Z78.0  Long term current use of aspirin Z79.82  
 Opioid dependence ([facility]) F11.20  Tobacco use disorder F17.200  Sepsis ([facility] [facility] A41.9  Perforated viscus R19.8  Septic shock ([facility]) A41.9, R65.21 Vital Signs: 
Visit Vitals /77 (BP 1 Location: Left arm, BP Patient Position: At rest) Pulse 75 Temp 98 °F (36.7 °C) Resp 18 Ht 5' 4\" (1.626 m) Wt 82.4 kg (181 lb 9.6 oz) LMP 2012 (Exact Date) SpO2 97% BMI 31.17 kg/m² O2 Device: Nasal cannula O2 Flow Rate (L/min): 3 l/min Temp (24hrs), Av °F (36.7 °C), Min:97.4 °F (36.3 °C), Max:99.1 °F (37.3 °C) Intake/Output:  
Last shift:      No intake/output data recorded. Last 3 shifts:  1901 -  0700 In: 2040 [P.O.:1440; I.V.:600] Out: 9845 [Urine:4400; Drains:25] Current Facility-Administered Medications Medication Dose Route Frequency  albuterol-ipratropium (DUO-NEB) 2.5 MG-0.5 MG/3 ML  3 mL Nebulization BID RT  
 spironolactone (ALDACTONE) tablet 25 mg  25 mg Oral DAILY  furosemide (LASIX) tablet 20 mg  20 mg Oral DAILY  multivit-folic acid-herbal 195 (WELLESSE PLUS) oral liquid 30 mL  30 mL Oral DAILY  predniSONE (DELTASONE) tablet 30 mg  30 mg Oral DAILY WITH BREAKFAST  insulin lispro (HUMALOG) injection   SubCUTAneous AC&HS  pantoprazole (PROTONIX) tablet 40 mg  40 mg Oral ACB&D  
 nystatin (MYCOSTATIN) 100,000 unit/mL oral suspension 500,000 Units  500,000 Units Oral QID  insulin glargine (LANTUS) injection 10 Units  10 Units SubCUTAneous DAILY  levothyroxine (SYNTHROID) tablet 100 mcg  100 mcg Oral 6am  
 apixaban (ELIQUIS) tablet 5 mg  5 mg Oral BID  
 amiodarone (CORDARONE) tablet 200 mg  200 mg Oral BID  budesonide (PULMICORT) 500 mcg/2 ml nebulizer suspension  500 mcg Nebulization BID RT Telemetry: [x]Sinus []A-flutter []Paced []A-fib []Multiple PVCs Physical Exam:  
  
General: Awake and alert, follows commands HEENT:  Anicteric sclerae; pink palpebral conjunctivae; mucosa moist 
Resp:  Symmetrical chest expansion, no accessory muscle use; good airway entry; no rales/ wheezing/ rhonchi noted CV:  S1, S2 present; regular rate and rhythm GI:  Abdomen soft, ostomy healthy, rectal tube, wound vac Extremities:  +2 pulses on all extremities; no edema/ cyanosis/ clubbing noted Skin:  Warm; no rashes/ lesions noted, normal turgor/cap refill Neurologic:  Non-focal, follows commands DATA: 
MAR reviewed and pertinent medications noted or modified as needed Labs: 
Recent Labs  
  03/16/20 
0226 03/15/20 
0226 03/14/20 
0324 WBC 10.6 9.9 9.2 HGB 9.5* 8.4* 8.1* HCT 29.2* 26.3* 25.2*  
* 407 460* Recent Labs  
  03/16/20 
0226 03/15/20 
0226 03/14/20 
0324  138 140  
K 3.3* 3.1* 3.5  100 105 CO2 32 32 29 * 130* 112* BUN 5* 6* 6*  
CREA 0.51* 0.49* 0.47* CA 8.2* 7.9* 7.8*  
MG 1.9 1.2* 1.5* PHOS 2.5 2.5 2.4* ALB  --  1.5* 1.4* SGOT  --  12 13 ALT  --  13 15 No results for input(s): PH, PCO2, PO2, HCO3, FIO2 in the last 72 hours. No results for input(s): FIO2I, IFO2, HCO3I, IHCO3, HCOPOC, PCO2I, PCOPOC, IPHI, PHI, PHPOC, PO2I, PO2POC in the last 72 hours. No lab exists for component: IPOC2 Imaging: 
[x]   I have personally reviewed the patients radiographs and reports XR Results (most recent): 
Results from [facility] Encounter encounter on 02/29/20 XR CHEST SNGL V  
 Narrative EXAM:  XR CHEST SNGL V 
 
INDICATION:   f/u respiratory failure COMPARISON: 3/8/2020 FINDINGS:  
 
NG tube removed. No new parenchymal opacities in either lung. Stable heart size. No pneumothorax or other acute changes. Impression IMPRESSION: 
 
Stable chest.  
 
 
CT Results (most recent): 
Results from Hospital Encounter encounter on 02/29/20 CT CHEST ABD PELV W CONT Narrative CT SCAN OF THE CHEST, ABDOMEN AND PELVIS, WITH CONTRAST INDICATION: Above. Arrived with shortness of breath and abdominal pain. Septic 
shock. Hypoxia. History of sarcoidosis/COPD. Altered. Abdominal distention and 
tympani. Intraperitoneal free air on chest radiograph. Perforated viscus. COMPARISON: Chest CT 1/21/2010. No prior abdominopelvic CT in PACS. Report is 
noted in the electronic medical record (care everywhere) of previous noncontrast 
enhanced abdominopelvic CT performed elsewhere 7/21/2014. TECHNIQUE: With IV administration of 70 mL Isovue-300, without administration of 
oral contrast, helically acquired serial axial CT images through the chest, 
abdomen and pelvis were obtained. Additional coronal and sagittal reformation 
images were also performed. All CT scans at this facility are performed using dose optimization technique as 
appropriate to the performed exam, to include automated exposure control, 
adjustment of the mA and/or kV according to patient's size (Including 
appropriate matching for site-specific examinations), or use of iterative 
reconstruction technique. FINDINGS: 
 
CT chest:  
 
Endotracheal tube tip is in place cephalad to the level of the alyssa. Esophagogastric tube is noted, tip in the mid stomach. Severe pulmonary emphysematous disease again seen with extensive bullous changes 
most conspicuous in the upper lungs. Scattered scarring. There has been interval 
development of consolidative appearing lung opacities in the lower lobes 
bilaterally consistent with airspace disease likely in addition to atelectasis. The small stable subpleural pulmonary nodule described in the right lower lobe 
on the recent chest CT is again seen, slightly better visualized on the current 
study measuring approximately 4 mm, unchanged compared to the CT of 2/6/2018 
(axial 34). Multifocal chronic nodular pleuro-parenchymal scarring in the left 
upper lobe without significant interval change compared to the preceding chest 
CT or study of 2/6/2018. No definite suspicious new pulmonary nodule/mass identified compared to the 
preceding CT. No evidence of pathologic lymph node enlargement is seen. No evidence of pleural or significant pericardial effusion. CT abdomen/pelvis:  
 
Small ascites, best seen around the liver and in the anterior pelvis adjacent to 
the distal descending and sigmoid colon. The spleen is heterogeneous in attenuation and demonstrates several rounded 
hypodensities as detailed on the recent chest CT of 1/21/2020. Indeterminate attenuation masses in the kidneys bilaterally, 2.7 cm right kidney 
upper to midpole laterally, 2.1 cm left kidney interpolar region posteriorly. Further evaluation recommended, beginning with ultrasound might be helpful when 
clinically feasible if the corresponding lesions can be identified 
sonographically. Additional smaller subcentimeter renal hypodensities 
bilaterally, possibly cysts, too small to be specifically characterized. The liver, gallbladder, pancreas, and adrenal glands appear unremarkable. Scattered calcifications in the abdominal aorta and its major branches. The 
abdominal aorta enhances normally without abdominal aortic aneurysm. A few scattered small subcentimeter short axis lymph nodes bilaterally in the 
pelvis or retroperitoneum. No evidence of pathologic lymph node enlargement is 
seen. Moderate quantity of intraperitoneal free air in the nondependent anterior 
abdomen, consistent with perforated viscus. Additional scattered foci of free 
air elsewhere in the peritoneum and mesentery, including in the right upper 
quadrant near the gallbladder/duodenum, and scattered bilaterally in the 
mesenteric/peritoneal fat of the upper and lower abdomen. Grouped small foci of 
intraperitoneal free air are noted close to the colonic wall along the 
left/anterior side of the sigmoid colon (reference axial 112-119).  The left 
hemicolon is diffusely mildly distended to the rectum containing gas, stool, and 
hyperdense material. Clinical correlation might be helpful regarding recent 
ingestion of hyperdense material. The rectum measures approximately 8.5 cm in 
 caliber. The sigmoid measures approximately 7.3 cm in caliber on axial image 115. The site of the or free viscus is uncertain. Common sites could include 
duodenal or gastric perforation such as may be seen due to perforated ulcer. However, there is small focal hypoattenuation along the wall of the sigmoid 
colon on series 2 axial images 111-113 which could potentially reflect a small 
mural defect such as could be seen related to constipation, stercoral colitis, 
diverticular disease. The appendix is seen within the ascites in the right lower quadrant, assessment 
for stranding in the periappendiceal fat limited by the ascites, without gross 
abnormal thickening of the appendix seen. No gas within the appendix. The right 
hemicolon appears grossly unremarkable. There is mild diffuse mural thickening of small bowel loops and mild prominence 
of enhancement, nonspecific. No definite associated pneumatosis. No portal 
venous gas is seen. The SMA/SMV appear to maintain usual orientation. Broadly 
speaking differential considerations could include inflammatory, infectious, 
ischemic. Clinical correlation is recommended including with lactate 
measurement. Conceivably sequela of peritonitis in the setting of bowel 
perforation? Uterus is present. Small gas is noted within the uterus and vagina, nonspecific, 
can be seen as a physiologic finding. On review of bone windows, there is a superior endplate compression fracture 
with mildly to moderately decreased vertebral body height at L2. Bilateral 
spinal fusion rods spanning from N0-F7-K05-T12. Severe compression fracture 
deformity again seen at T9. Compression fracture with rather pronounced 
decreased vertebral body height again seen at T5. Mild endplate indentations at 
several other levels including T4, T6, T10.  The posterior midline fluid 
collection described on the preceding study was better visualized on the 
 preceding study, extensive metallic hardware artifacts noted. Impression Impression: Moderate quantity of intraperitoneal free air. Findings are consistent with 
perforated viscus. The site of perforation is uncertain, as discussed above, 
possibly the sigmoid colon where there is apparent small subtle focal 
hypoattenuation along the wall as detailed above (reference axial images 111-113) close to region where several foci of free air are grouped. The left 
hemicolon is mildly distended and filled with hyperdense appearing stool. Mild diffuse mural thickening and enhancement of the small bowel as described. Small volume of ascites best seen around the liver and in the pelvis adjacent to 
the sigmoid. Bibasilar consolidations, right greater than left, suspicious for airspace 
disease/pneumonia. Indeterminate attenuation bilateral renal masses, follow-up evaluation 
recommended. Splenic hypodensities again seen as described on recent chest CT. Intubated. Severe pulmonary emphysematous disease and fibrotic changes. Multiple otherwise nonacute findings as detailed above. I have discussed these results directly with Dr. Damian Hernandez in the ED at 12:20 p.m. 
in addition to the patient's consulted surgeon at 12:25 PM. IMPRESSION:  
· Acute on Chronic Respiratory Failure - Emergently intubated in ED for airway protection-liberated from ventilator and extubated 3/8/2020 . chronically on 3L NC at home. · Acute Sigmoid Colon Perforation w/ peritonitis - s/p emergent ex-lap w/ drainage of intraabdominal stool and collections; sigmoid colectomy with Dr. Heather Jennings on 02/29/20. Subsequent washout and abdominal closure on 03/01/20.  · Staph bacteremia - Grew on BCx collected 3/3. GNR bacteremia on 2/29, no regrowth · Hypokalemia - Likely 2/2 diuresis. · Hypophosphatemia- ?refeeding syndrome- resolved · Metabolic alkalosis ?diuresis / GI loss · Intermittent Tachyarrhythmia - afibb/flutter - improved · Septic Shock due to peritonitis, UTI · Bilateral PNA - Sputum Cx collected 3/1 grew Moraxella catarrhalis. · Troponemia - demand ischemia, not an MI.  
· Hypothyroidism · ELIF - Likely pre-renal, 2/2 above. Initial ELIF resolved. Pt w/ mild increase in BUN/Cr w/ diuresis. · UTI - UCx grew E.coli.  
· T2DM  
· Paraparesis, in wheelchair: Recent Hx of Spine Thoracic Laminectomy T6-T11 with Fusion (12/11/20) with Dr. Avani Ward 2/2 Acute compression fx of T9-T10 and spinal cord compression at T9 and T10 and spinal cord edema with subsequent paraparesis. · Hx of Sarcoidosis - Chronic steroid use. · Hx of COPD, on chronic 3L NC, FEV1 51% in 2012, unable to perform repeat studies since  - Follows with Dr. Edwina Pan in clinic. · Hx of Cocaine and Heroin Abuse - on methadone. Patient Active Problem List  
Diagnosis Code  Diabetic neuropathy associated with type 2 diabetes mellitus (HCC) E11.40  Venous insufficiency I87.2  Obesity, Class I, BMI 30-34.9 E66.9  Chronic back pain M54.9, G89.29  
 Generalized osteoarthritis of multiple sites M15.9  Bipolar affective disorder ([facility] [address] F31.9  Abnormally low high density lipoprotein (HDL) cholesterol with hypertriglyceridemia E78.6, E78.1  Diastolic dysfunction without heart failure I51.89  Chronic obstructive pulmonary disease (COPD) ([facility] [address] J44.9  Hypertensive heart disease without heart failure I11.9  Depression F32.9  History of back injury Z87.828  Type 2 diabetes mellitus with diabetic neuropathy, with long-term current use of insulin (HCC) E11.40, Z79.4  Anxiety F41.9  Wears glasses Z97.3  History of hepatitis C Z86.19  
 Sickle cell trait (HCC) D57.3  History of acute renal failure Z87.448  Hypothyroidism E03.9  Recurrent genital herpes A60.00  Sarcoidosis D86.9  Abscess of right arm L02.413  Hypoxemia requiring supplemental oxygen R09.02, Z99.81  
  Abnormal nuclear stress test R94.39  
 Cellulitis and abscess of hand L03.119, L02.519  
 Cellulitis and abscess of foot L03.119, L02.619  
 Cellulitis of arm, right L03. 113  
 Olecranon bursitis of right elbow M70.21  
 Contusion of left elbow S50. 02XA  Intravenous drug user F19.90  Right Achilles tendinitis M76.61  
 History of penicillin allergy Z88.0  Falls frequently R29.6  Gastroesophageal reflux disease with hiatal hernia K21.9, K44.9  Memory difficulty R41.3  Mixed connective tissue disease ([facility] M35.1  Impaired mobility and ADLs Z74.09  
 Hyperuricemia E79.0  History of vitamin D deficiency Z86.39  
 Urge urinary incontinence N39.41  Spinal cord compression ([facility]) G95.20  Compression fracture of body of thoracic vertebra ([facility]) S22.000A  Status post laminectomy with spinal fusion Z98.1  Acute blood loss as cause of postoperative anemia D62  
 History of fracture due to fall Z87.81  Chronic respiratory failure with hypoxia ([facility]) J96.11  
 Cellulitis of right forearm L03. 113  
 Gout M10.9  Menopause Z78.0  Long term current use of aspirin Z79.82  
 Opioid dependence ([facility]) F11.20  Tobacco use disorder F17.200  Sepsis ([facility] [facility] A41.9  Perforated viscus R19.8  Septic shock ([facility] A41.9, R65.21  
 
  
RECOMMENDATIONS:  
 
Pulm: continue O2 support. Continue duo nebs, maintenance prednisone dose 30 mg daily ID-continue Vanco and Zosyn for peritonitis, per ID Surgical-continue postop wound care, ostomy care, wound VAC. Recommend D/c of rectal tube as the patient is diverted and it is hindering her participation in rehab. Nutrition-advance as tolerated. Dietary service following Electrolytes-monitor and replace Aggressive PT/OT No further recommendations from a pulmonary standpoint, will follow peripherally. Elana Greenwood MD 
03/16/20 Pulmonary, Critical Care Medicine [facility]

## 2020-03-16 NOTE — PROGRESS NOTES
Problem: Mobility Impaired (Adult and Pediatric) Goal: *Acute Goals and Plan of Care (Insert Text) Description: Physical Therapy Goals Initiated 3/16/2020 and to be accomplished within 7 day(s) 1. Patient will move from supine to sit and sit to supine  and roll side to side in bed with moderate assistance . 2.  Patient will transfer from bed to chair and chair to bed with maximal assistance using the least restrictive device (SLIDING BOARD) 3. Patient will increase sitting balance to fair for increased safety with transfers. 4.  Patient will perform WC mobility 50 feet minimal assistance. Prior Level of Function:  
Patient was moderate assistance  for all mobility including bed mobility and sliding board transfers per last PT notes in 1/2020. Pt is nonambulatory. Patient lives alone with 24/7 care with nursing/aides in a single story home 4 steps to enter B handrail assist. Pt is dependent with stairs. She states she has a manual WC that she propels. She also states she has a bedside commode. Outcome: Progressing Towards Goal 
 PHYSICAL THERAPY RE-EVALUATION and TREATMENT Patient: Nirmal Trevino (65 y.o. female) Date: 3/16/2020 Primary Diagnosis: Septic shock ([facility] [address] [A41.9, R65.21] Perforated viscus [R19.8] Procedure(s) (LRB): 
LAPAROTOMY EXPLORATORY, DESCENDING COLECTOMY, CREATION OF COLOSTOMY (N/A) 15 Days Post-Op Precautions:   Fall(Spinal sx (12/2019)) ASSESSMENT : 
Patient is cleared by nursing for PT reevaluation, and patient consents to therapy. Pt has worked with rehab technician over the past week and started to increase participation with B LE. Today, pt is ready to start functional mobility with assistance. Pt requires maximum assistance with rolling. Performed partial supine to sit maximum assistance. Pt fearful to scoot closer to the edge of bed today. Sit to supine maximum assistance. Scooting up in bed with bed modified maximum assistance.  Pt ended therapy supine in bed turned to the left with all needs met. Patient will benefit from skilled intervention to address the above impairments. Patient's rehabilitation potential is considered to be Fair Factors which may influence rehabilitation potential include:   
[]         None noted 
[]         Mental ability/status [x]         Medical condition 
[]         Home/family situation and support systems 
[]         Safety awareness 
[]         Pain tolerance/management 
[]         Other: PLAN : 
Recommendations and Planned Interventions:   
[x]           Bed Mobility Training             [x]    Neuromuscular Re-Education 
[x]           Transfer Training                   []    Orthotic/Prosthetic Training 
[x]           Gait Training                          [x]    Modalities [x]           Therapeutic Exercises           [x]    Edema Management/Control 
[x]           Therapeutic Activities            [x]    Family Training/Education 
[x]           Patient Education 
[]           Other (comment): Frequency/Duration: Patient will be followed by physical therapy 1-2 times per day/4-7 days per week to address goals. Discharge Recommendations: Jonathan Caldwell Further Equipment Recommendations for Discharge: N/A  
 
SUBJECTIVE:  
Patient stated is the hospital still on lockdown?  OBJECTIVE DATA SUMMARY:  
Hospital course since last seen and reason for re-evaluation: Pt has worked with rehab technician over the past week and started to increase participation with B LANCE. Today, pt is ready to start functional mobility with assistance. Updated PT goals. Past Medical History:  
Diagnosis Date Abnormally low high density lipoprotein (HDL) cholesterol with hypertriglyceridemia Lipid profile (11/6/2016) showed , , HDL 38, LDL 37 Acute blood loss as cause of postoperative anemia 12/12/2019 Anxiety Bipolar affective disorder ([facility] [address] 12/5/2012 Cellulitis of right forearm 2017 Chronic back pain Chronic obstructive pulmonary disease (COPD) ([address] SOB, on [place] O2 Recent admission with psychosis Chronic respiratory failure with hypoxia (HCC) On home oxygen inhalation at 3 LPM via NC Contusion of left elbow 2017 Depression Diabetic neuropathy associated with type 2 diabetes mellitus ([address] Diastolic dysfunction without heart failure Stable on diuretics Falls frequently Gastroesophageal reflux disease with hiatal hernia Generalized osteoarthritis of multiple sites Gout On Allopurinol History of acute renal failure 2013 History of back injury   
 jumped out of second story window History of fracture due to fall 2019 History of hepatitis C   
 treated History of penicillin allergy History of vitamin D deficiency 5/10/2017 Hypertensive heart disease without heart failure Better controlled Hyperuricemia 2017 Hypothyroidism Hypoxemia requiring supplemental oxygen 2014 Intravenous drug user 2017 Long term current use of aspirin Memory difficulty Menopause Mixed connective tissue disease ([address] 5/10/2017 Obesity, Class I, BMI 30-34.9 Olecranon bursitis of right elbow 2017 Opioid dependence ([address] On Methadone Recurrent genital herpes 2013 Right Achilles tendinitis 2017 Sarcoidosis Sickle cell trait ([address] Tobacco use disorder Type 2 diabetes mellitus with diabetic neuropathy, with long-term current use of insulin ([address] HbA1c (2019) = 7.1 Urge urinary incontinence 5/10/2017 Venous insufficiency Wears glasses Past Surgical History:  
Procedure Laterality Date [address] HX  SECTION    
 HX CYST REMOVAL Left  Left foot HX OTHER SURGICAL Left 2017 S/P incision and drainage of the abscess of the left hand and the left foot (4/17/2017 - Dr. Amy Turcios. Sherrod Sacks) HX THORACIC LAMINECTOMY  12/11/2019 S/P T10, T9, T8 laminectomy; T7, T8, T9, T10, T11, T12 posterior thoracic fusion; segmental spinal instrumentation; K2M [place] type, T7-T8, T11-T12 (12/11/2019 - Dr. Ros Pathak) HX TUBAL LIGATION Barriers to Learning/Limitations: yes;  altered mental status (i.e.Sedation, Confusion) Compensate with: Visual Cues, Verbal Cues, and Tactile Cues Home Situation:  
Home Situation Home Environment: Private residence # Steps to Enter: 4(pt was dependent with stairs) Rails to Enter: Yes Hand Rails : Bilateral 
One/Two Story Residence: One story Living Alone: (has 24/7 nursing/aide care) Support Systems: (nursing/aide care 24/7) Patient Expects to be Discharged to[de-identified] Private residence Current DME Used/Available at Home: Wheelchair, Commode, bedside Critical Behavior: 
Neurologic State: Alert Orientation Level: Oriented to person;Oriented to place Cognition: Follows commands;Decreased attention/concentration Safety/Judgement: Fall prevention Psychosocial 
Patient Behaviors: Calm; Cooperative Purposeful Interaction: Yes Pt Identified Daily Priority: Clinical issues (comment) Caring Interventions: Reassure; Therapeutic modalities Reassure: Therapeutic listening; Informing; Acceptance; Support family Therapeutic Modalities: Humor; Intentional therapeutic touch B LE Strength:   
Strength: Grossly decreased, non-functional(B LE ankle 1/5, knee 2/5, hip 2/5) B LE Tone & Sensation:  
Tone: Normal         
Sensation: Intact B LE Range Of Motion: 
AROM: Generally decreased, functional       
PROM: Within functional limits Posture: 
  
Posture Assessment: Forward head Functional Mobility: 
Bed Mobility: 
Rolling: Maximum assistance Supine to Sit: Maximum assistance Sit to Supine: Maximum assistance Scooting: Maximum assistance Balance:  
Sitting: Impaired; With support Sitting - Static: Poor (constant support) Therapeutic Exercises:  
Reviewed and performed ankle pumps to increase blood flow and circulation. Performed AROM B LE including ankle pumps, quad sets, glut sets, hip abd/adduction, and heel slides x 10 reps. Pain: 
Pain level pre-treatment: 10/10 buttocks Pain level post-treatment: same Pain Intervention(s) : Medication (see MAR); Rest, Repositioning Response to intervention: Nurse notified, See doc flow Activity Tolerance:  
fair Please refer to the flowsheet for vital signs taken during this treatment. After treatment:  
[]         Patient left in no apparent distress sitting up in chair 
[x]         Patient left in no apparent distress in bed 
[x]         Call bell left within reach [x]   Personal items in reach [x]         Nursing notified Minerva Fields 
[]         Caregiver present 
[]         Bed/chair alarm activated 
[x]         SCDs applied COMMUNICATION/EDUCATION:  
[x]         Role of Physical Therapy in the acute care setting. [x]         Fall prevention education was provided and the patient/caregiver indicated understanding. [x]         Patient/family have participated as able in goal setting and plan of care. [x]         Patient/family agree to work toward stated goals and plan of care. []         Patient understands intent and goals of therapy, but is neutral about his/her participation. []         Patient is unable to participate in goal setting/plan of care: ongoing with therapy staff. 
[]         Out of bed at least 3-5 times a day with nursing assistance. []         Other: Thank you for this referral. 
Thom Saldivar, PT, DPT Time Calculation: 23 mins

## 2020-03-16 NOTE — PROGRESS NOTES
Problem: Dysphagia (Adult) Goal: *Acute Goals and Plan of Care (Insert Text) Description: Patient will: 1. Tolerate PO trials with 0 s/s overt distress in 4/5 trials 2. Utilize compensatory swallow strategies/maneuvers (decrease bite/sip, size/rate, alt. liq/sol) with min cues in 4/5 trials 3. Perform oral-motor/laryngeal exercises to increase oropharyngeal swallow function with min cues 4. Complete an objective swallow study (i.e., MBSS) to assess swallow integrity, r/o aspiration, and determine of safest LRD, min A as indicated/ordered by MD  
 
Recommend:  
Puree, thin liquids Meds as tolerated Aspiration precautions HOB >45 degrees during all intake and for at least 30 min after po Small bites/sips, slow rate of intake, alternating bites/sips Oral care three times daily Outcome: Progressing Towards Goal 
 
SPEECH LANGUAGE PATHOLOGY DYSPHAGIA TREATMENT Patient: Modesto Maldonado (65 y.o. female) Date: 3/16/2020 Diagnosis: Septic shock ([facility] [address] [A41.9, R65.21] Perforated viscus [R19.8] <principal problem not specified> Procedure(s) (LRB): 
LAPAROTOMY EXPLORATORY, DESCENDING COLECTOMY, CREATION OF COLOSTOMY (N/A) 15 Days Post-Op Precautions: Aspiration, Fall(Spinal sx (12/2019)) PLOF: Regular diet with thin liquids ASSESSMENT: 
Pt seen for follow up dysphagia tx with RN present during portion of session. Pt with minimal intake of breakfast meal, requesting oatmeal and stating \"I can't eat that cream of wheat stuff\". Able to tolerate puree trials with delayed but thorough bolus prep and no overt s/sx aspiration. Tolerating thin liquids + straw and multiple pills with liquid wash with no overt s/sx aspiration. Pt again refusing Nystatin despite max encouragement from RN and SLP and continues to report burning in mouth/throat. Recommend advance diet to puree, thin liquids with strict aspiration precautions/encouragement for intake.   Results/recommendations discussed with pt, RN and dietary. Will follow for further dysphagia management. Progression toward goals: 
[]         Improving appropriately and progressing toward goals [x]         Improving slowly and progressing toward goals 
[]         Not making progress toward goals and plan of care will be adjusted PLAN: 
Recommendations and Planned Interventions: 
Patient continues to benefit from skilled intervention to address the above impairments. Continue treatment per established plan of care. Discharge Recommendations: To Be Determined SUBJECTIVE:  
Patient stated I don't want it. Not here. OBJECTIVE:  
Cognitive and Communication Status: 
Neurologic State: Alert Orientation Level: Oriented to person, Oriented to place Cognition: Follows commands, Decreased attention/concentration Perception: Appears intact Perseveration: No perseveration noted Safety/Judgement: Fall prevention Dysphagia Treatment: 
Oral Assessment: 
Oral Assessment Labial: No impairment Dentition: Natural, Limited Oral Hygiene: Poor Lingual: Decreased strength, Decreased rate Velum: Unable to visualize Mandible: No impairment P.O. Trials: 
 Patient Position: 45 at [facility] Vocal quality prior to P.O.: No impairment Consistency Presented: Puree, Thin liquid, Pudding, Pill/Tablet How Presented: SLP-fed/presented, Self-fed/presented, Cup/sip, Spoon, Straw, Successive swallows Bolus Acceptance: No impairment Bolus Formation/Control: Impaired Type of Impairment: Delayed, Posterior, Poor Propulsion: Delayed (# of seconds) Oral Residue: Greater than 50% of bolus(With mech soft and puree ) Initiation of Swallow: No impairment Laryngeal Elevation: Functional 
 Aspiration Signs/Symptoms: None Pharyngeal Phase Characteristics: Poor endurance, Easily fatigued Effective Modifications: None Cues for Modifications: Moderate Oral Phase Severity: Moderate Pharyngeal Phase Severity : Mild PAIN: 
 Start of Tx: not reported End of Tx: not reported After treatment:  
[]              Patient left in no apparent distress sitting up in chair 
[x]              Patient left in no apparent distress in bed 
[x]              Call bell left within reach [x]              Nursing notified 
[]              Family present 
[]              Caregiver present 
[]              Bed alarm activated COMMUNICATION/EDUCATION:  
[x] Aspiration precautions; swallow safety; compensatory techniques []        Patient/family able to participate in training and education  
[]  Patient unable to participate in training and education, education ongoing with staff [x] Patient understands goals and intent of therapy; neutral about participation Aaron Dietz M.S., CCC-SLP Speech-Language Pathologist

## 2020-03-16 NOTE — PROGRESS NOTES
Infectious Disease progress Note Reason: sepsis, gram negative bacteremia, staph epidermidis bacteremia Current abx Prior abx Pip/tazo since 3/1-3/14/20 Cefepime, metronidazole 2/29-3/1 Vancomycin 2/29-3/2/20 Vancomycin 3/6/20-3/12/20 Lines:  
 
 
Assessment : 
 
62 y.o. 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 Clinical presentation c/w septic shock (POA) due to prevotella bloodstream infection (positive blood culture 2/29), sigmoid perforation, secondary peritonitis s/p emergent Ex lap on 2/29/20. Intra operatively found to have sigmoid colon perforation, 2/2 constipation with large amount of stool burden spilling into the peritoneum per Dr. Yemi Green. S/p Re-exploratory laparotomy, drainage of intra-abdominal stool collection, descending colectomy with mobilization of the splenic flexure, and transverse colostomy on 3/1. Significant pyuria on UA 2/29. Urine culture >100,000 e.coli suggestive of probable cystitis. Acute on chronic respiratory failure - likely due to sepsis. Sputum culture 3/1- moraxella (beta lactamase positive). Diffuse pulmonary opacities concerning for aspiration pneumonia. Single positive blood culture for epidermidis on 3/3 seemed likely contaminant since patient was improving off vancomycin. However, Blood culture 3/5: Positive for Staphylococcus epidermidis. Negative blood cultures 3/9/2020. Clinically fine off vancomycin. abx management complicated due to h/o pcn allergy. Currently tolerating pip/tazo without difficulties. Clinically better. Resolved abdominal tenderness. Recommendations: 
 
1. Hold further abx 2. F/u surgery recommendations Will sign off. Follow-up PRN. Above plan was discussed in details with primary team. Please call me if any further questions or concerns. Will continue to participate in the care of this patient. HPI: 
 
Denies significant abdominal pain. No nausea or vomiting. Improved appetite. Wants to eat tunafish. No subjective fever/chills. home Medication List  
 Details  
roflumilast (DALIRESP) 250 mcg tab Take 250 mcg by mouth daily. Qty: 30 Tab, Refills: 0  
  
ARIPiprazole (ABILIFY) 10 mg tablet Take 1 Tab by mouth. aspirin-calcium carbonate 81 mg-300 mg calcium(777 mg) tab Take 1 Tab by mouth. divalproex DR (DEPAKOTE) 250 mg tablet Take 1 Tab by mouth. doxycycline (MONODOX) 100 mg capsule   
  
gabapentin (NEURONTIN) 300 mg capsule Take 1 Cap by mouth. ipratropium (ATROVENT) 0.02 % soln   
  
methylPREDNISolone (MEDROL DOSEPACK) 4 mg tablet   
  
oxyCODONE-acetaminophen (PERCOCET) 5-325 mg per tablet Take 1 Tab by mouth. tolterodine (DETROL) 1 mg tablet Take 1 Tab by mouth. traMADol (ULTRAM) 50 mg tablet Take 1 Tab by mouth. traZODone (DESYREL) 100 mg tablet Take 1 Tab by mouth. !! busPIRone (BUSPAR) 15 mg tablet Take 15 mg by mouth two (2) times a day. Indications: repeated episodes of anxiety Qty: 60 Tab, Refills: 2 OXYGEN-AIR DELIVERY SYSTEMS 3 L/min by Nasal route continuous. [employer] Choice O2  
  
albuterol-ipratropium (DUO-NEB) 2.5 mg-0.5 mg/3 ml nebu 3 mL by Nebulization route every six (6) hours as needed for Wheezing. Qty: 30 Nebule, Refills: 1  
  
!! busPIRone (BUSPAR) 10 mg tablet Take 10 mg by mouth three (3) times daily. Indications: repeated episodes of anxiety  
  
cholecalciferol (VITAMIN D3) (1000 Units /25 mcg) tablet Take 1,000 Units by mouth two (2) times a day. levothyroxine (SYNTHROID) 100 mcg tablet Take 100 mcg by mouth Daily (before breakfast). insulin glargine (LANTUS U-100 INSULIN) 100 unit/mL injection 20 Units by SubCUTAneous route Daily (before breakfast). famotidine (PEPCID) 20 mg tablet Take 20 mg by mouth nightly. aspirin 81 mg chewable tablet Take 1 Tab by mouth daily (with breakfast). Indications: stroke prevention 
Qty: 30 Tab, Refills: 0  
  
docusate sodium (COLACE) 100 mg capsule Take 1 Cap by mouth daily (after breakfast). Indications: constipation 
Qty: 30 Cap, Refills: 0  
  
predniSONE (DELTASONE) 20 mg tablet Take 20 mg by mouth daily (with breakfast). Indications: sarcoidosis Qty: 30 Tab, Refills: 0 Associated Diagnoses: Sarcoidosis  
  
ascorbic acid, vitamin C, (VITAMIN C) 250 mg tablet Take 1 Tab by mouth daily (with breakfast). Qty: 30 Tab, Refills: 0 Associated Diagnoses: Acute blood loss as cause of postoperative anemia  
  
calcium citrate 200 mg (950 mg) tablet Take 1 Tab by mouth two (2) times a day. Indications: post-menopausal osteoporosis prevention 
Qty: 60 Tab, Refills: 0 Associated Diagnoses: Status post laminectomy with spinal fusion  
  
ferrous sulfate 325 mg (65 mg iron) tablet Take 1 Tab by mouth daily (with breakfast). Qty: 30 Tab, Refills: 0 Associated Diagnoses: Acute blood loss as cause of postoperative anemia  
  
acetaminophen (TYLENOL) 325 mg tablet Take 2 Tabs by mouth every four (4) hours as needed for Pain. Indications: pain 
Qty: 60 Tab, Refills: 0 Associated Diagnoses: Status post laminectomy with spinal fusion  
  
brief disposable (ADULT) misc by Does Not Apply route. Qty: 1 Package, Refills: 0 Associated Diagnoses: Urge urinary incontinence  
  
methadone (DOLOPHINE) 5 mg tablet Take 4 Tabs by mouth daily. Max Daily Amount: 20 mg. 
Qty: 10 Tab, Refills: 0 Associated Diagnoses: History of back injury  
  
polyethylene glycol (MIRALAX) 17 gram/dose powder Take 17 g by mouth daily. 1 tablespoon with 8 oz of water daily 
Qty: 289 g, Refills: 0 umeclidinium-vilanterol (ANORO ELLIPTA) 62.5-25 mcg/actuation inhaler Take 1 Puff by inhalation daily. Qty: 1 Inhaler, Refills: 0 BD INSULIN SYRINGE ULTRA-FINE 1 mL 31 gauge x 5/16 syrg   
  
rosuvastatin (CRESTOR) 5 mg tablet TAKE ONE TABLET NIGHTLY Qty: 90 Tab, Refills: 3  
  
albuterol (PROAIR HFA) 90 mcg/actuation inhaler INHALE 2 PUFFS EVERY 4 HOURS AS NEEDED FOR WHEEZING Qty: 1 Inhaler, Refills: 5  
  
oxybutynin (DITROPAN) 5 mg tablet Take 5 mg by mouth two (2) times a day. allopurinol (ZYLOPRIM) 100 mg tablet Take 100 mg by mouth daily. insulin lispro (HUMALOG) 100 unit/mL injection To be given 15 min before meals: < 150 - None, 151-200 - 2 u, 201-250 - 4 u, 251-300 - 6 u, 301-350 - 8 u, 351-400 - 10 u, >400 - 12 u Qty: 1 Vial, Refills: 0 Associated Diagnoses: Type 2 diabetes mellitus with stage 3 chronic kidney disease, with long-term current use of insulin ([facility])  
  
insulin syringe,safetyneedle 1 mL 30 gauge x 5/16\" syrg As directed Qty: 50 Each, Refills: 0 Nebulizer Accessories Kit Use with nebulizer machine Qty: 1 Kit, Refills: prn  
 Associated Diagnoses: COPD (chronic obstructive pulmonary disease) ([facility])  
  
sertraline (ZOLOFT) 50 mg tablet Take 50 mg by mouth daily. Indications: Bipolar Depression Current Facility-Administered Medications Medication Dose Route Frequency  albuterol-ipratropium (DUO-NEB) 2.5 MG-0.5 MG/3 ML  3 mL Nebulization BID RT  
 oxyCODONE-acetaminophen (PERCOCET) 5-325 mg per tablet 1 Tab  1 Tab Oral Q6H PRN  
 acetaminophen (TYLENOL) tablet 325 mg  325 mg Oral Q6H PRN  
 spironolactone (ALDACTONE) tablet 25 mg  25 mg Oral DAILY  furosemide (LASIX) tablet 20 mg  20 mg Oral DAILY  multivit-folic acid-herbal 156 (WELLESSE PLUS) oral liquid 30 mL  30 mL Oral DAILY  predniSONE (DELTASONE) tablet 30 mg  30 mg Oral DAILY WITH BREAKFAST  insulin lispro (HUMALOG) injection   SubCUTAneous AC&HS  
  pantoprazole (PROTONIX) tablet 40 mg  40 mg Oral ACB&D  
 nystatin (MYCOSTATIN) 100,000 unit/mL oral suspension 500,000 Units  500,000 Units Oral QID  insulin glargine (LANTUS) injection 10 Units  10 Units SubCUTAneous DAILY  levothyroxine (SYNTHROID) tablet 100 mcg  100 mcg Oral 6am  
 apixaban (ELIQUIS) tablet 5 mg  5 mg Oral BID  
 amiodarone (CORDARONE) tablet 200 mg  200 mg Oral BID  acetaminophen (TYLENOL) solution 650 mg  650 mg Per NG tube Q4H PRN  
 diphenhydrAMINE (BENADRYL) injection 25 mg  25 mg IntraVENous Q4H PRN  
 sodium chloride (NS) flush 5-10 mL  5-10 mL IntraVENous PRN  
 sodium chloride (NS) flush 5-40 mL  5-40 mL IntraVENous PRN  
 albuterol (ACCUNEB) nebulizer solution 1.25 mg  1.25 mg Nebulization Q6H PRN  
 budesonide (PULMICORT) 500 mcg/2 ml nebulizer suspension  500 mcg Nebulization BID RT  
 glucose chewable tablet 16 g  4 Tab Oral PRN  
 glucagon (GLUCAGEN) injection 1 mg  1 mg IntraMUSCular PRN  
 dextrose (D50W) injection syrg 12.5-25 g  25-50 mL IntraVENous PRN Allergies: Moxifloxacin; Pcn [penicillins]; and Sulfa (sulfonamide antibiotics) Temp (24hrs), Av.2 °F (36.8 °C), Min:97.4 °F (36.3 °C), Max:99.1 °F (37.3 °C) Visit Vitals /77 (BP 1 Location: Left arm, BP Patient Position: At rest) Pulse 75 Temp 98 °F (36.7 °C) Resp 18 Ht 5' 4\" (1.626 m) Wt 82.4 kg (181 lb 9.6 oz) LMP 2012 (Exact Date) SpO2 97% BMI 31.17 kg/m² ROS: 12 point review of systems obtained in details pertinent positive as mentioned in history of present illness, otherwise negative Physical Exam: 
 
General:   Laying on the bed, alert awake oriented x3, appears comfortable Head:  Normocephalic, without obvious abnormality, atraumatic. Eyes:  Conjunctivae/corneas clear. PERRL, Nose: Nares normal. Septum midline.  Mucosa normal. .  
Throat: Lips, mucosa, and tongue normal. Teeth and gums normal.  
 Neck: Supple, symmetrical, trachea midline, no adenopathy, no carotid bruit and no JVD. Right IJ CVC in place. Lungs:    Clear to auscultation bilaterally; no wheezes/rales noted. Heart:  RRR, S1, S2 normal, no m/r/g Abdomen:   Soft,  no significant tenderness. +midline surgical incision, dressing c/d/i. Present bowel sounds. No masses,  No organomegaly. Extremities: Extremities normal, atraumatic, no cyanosis or edema. Pulses: 2+ and symmetric all extremities. Skin: Skin color, texture, turgor normal. No rashes or lesions Neurologic:  Alert awake oriented x3. No gross motor or sensory deficits noted Labs: Results:  
Chemistry Recent Labs  
  03/16/20 
0226 03/15/20 
0226 03/14/20 
9971 * 130* 112*  138 140  
K 3.3* 3.1* 3.5  100 105 CO2 32 32 29 BUN 5* 6* 6*  
CREA 0.51* 0.49* 0.47* CA 8.2* 7.9* 7.8* AGAP 5 6 6 BUCR 10* 12 13 AP  --  52 48 TP  --  5.9* 5.8* ALB  --  1.5* 1.4*  
GLOB  --  4.4* 4.4* AGRAT  --  0.3* 0.3* CBC w/Diff Recent Labs  
  03/16/20 
0226 03/15/20 
0226 03/14/20 
0324 WBC 10.6 9.9 9.2  
RBC 3.52* 3.19* 3.01* HGB 9.5* 8.4* 8.1* HCT 29.2* 26.3* 25.2*  
* 407 460* GRANS 74 74 80* LYMPH 18* 7* 16* EOS 1 0 0 Microbiology No results for input(s): CULT in the last 72 hours. RADIOLOGY: 
 
All available imaging studies/reports in [facility] for this admission were reviewed Dr. Ciara Ochoa, Infectious Disease Specialist 
[phone] March 16, 2020 
8:38 AM

## 2020-03-16 NOTE — PROGRESS NOTES
NUTRITION Nutrition Consult: General Nutrition Management & Supplements RECOMMENDATIONS / PLAN:  
 
- Change multivitamin to tablet. - Monitor meal/supplement intake and diet tolerance. Assist with meals. - Electrolyte replacement per MD. Recommend replacing potassium.   
- Continue RD inpatient monitoring and evaluation. NUTRITION INTERVENTIONS & DIAGNOSIS:  
 
- Meals/snacks: modified composition - Medical food supplement therapy: Ensure Enlive TID 
- Vitamin and mineral supplement therapy: multivitamin- modify, MD to replace electrolytes - Collaboration and referral of nutrition care: discussed recommendation to replace potassium with Dr Olu Pabon Nutrition Diagnosis: Inadequate oral intake related to decreased appetite, weakness, altered GI function as evidenced by pt consuming 50% or less of recent meals and requires assistance eating. Altered nutrition related laboratory values related to increased losses via urine and stool, on diuretics with FMS and colostomy as evidenced by hypokalemia, potassium of 3.3 on 3/16. ASSESSMENT:  
 
3/16: Pt refusing nystatin and liquid oral multivitamin- states they cause burning sensation but able to tolerate oral tablet medications and swallow without pain or difficulty. Decreased output from FMS, making good urine, reports fair appetite but meal intake remains poor and pt takes long time to eat; denies nausea/vomiting and c/o abdominal tightness and states mouth pain continues to improve. Encouraged po intake. 3/14: Pt has not consumed Ensure yet, but plans to, unable to assess tolerance. Reviewed labs, phos remains low, but is receiving oral replacement. K now wnl.   Ionized Ca low, consider calcium supplementation as appropriate per MD. 
3/13: Patient reports appetite/meal intake improved some today, ate 50% of breakfast and mouth pain has improved, c/o burning with magic cup supplements but agreeable to trying different supplements and encouraged po intake. C/o continued abdominal pain, denies nausea/vomiting. Pt states she does not want NGT placed. 3/12: Appetite and meal intake remain poor, c/o mouth and stomach pain, denies nausea/vomiting and ate only jello today. Does not like the Ensure pudding. Downgraded to full liquid diet by SLP this morning. Increased output from FMS- replaced overnight, also with increased output from wound VAC and minimal stool out colostomy. Recent BG 85, 88, 94 mg/dL and lantus held by nursing this morning.  
3/11: Patient with poor appetite and meal intake, c/o mouth pain from sores on tongue causing pain when eating and drinking but improved some today after treatment with lubrication and started on nystatin. No output from FMS but now increased stool from colostomy with continued hypokalemia. Likes supplements, able to consume 1/3 of magic cup at dinner yesterday per family at bedside. Hypoglycemia with tube feeding stopped and steroids decreased and lantus dose to decrease per discussion with MD.  
3/10: Tolerating bolus feeds, hypernatremia improving and replacement given for K of 2.5 today. Requires assistance eating, consuming 30-50% of recent meals and Surgery recommending NGT removal as soon as possible- will remove and discontinue supplemental tube feeding despite inadequate meal intake per discussion with MD. Loose stool per rectum and FMS placed overnight in addition to colostomy output and 3.4 L UOP total yesterday after diuresis- contributing to hypokalemia with low normal magnesium- to be replaced to assist with K repletion.  
3/9: Extubated 3/7, NGT keep in place post extubation and tube feeding at goal. Diet started after swallow evaluation this morning, pt lethargic with anticipated poor meal intake- will continue feeds, change to bolus regimen and monitor adequacy of meal intake per MD.  
 3/7: Tolerating feeds at 30 mL/hr, residual  mL. Receiving mout etoclopramide & miralax held this morning after large loose stool filled colostomy bag. 
3/6: Feeds advanced to 20 mL/hr then rate dropped back down to 10 mL/hr after 200 mL residual. Pt with chronic constipation, hx of narcotic use, partial paralysis and prolonged ileus anticipated per MD. Lantus increased for hyperglycemia, hypernatremia improving. 3/5: Tolerating trickle feeds, hypernatremia noted. 3/4: Minimal out from colostomy with 1100 mL out from NGT- will start trial of trickle feeding and monitor. 3/3: Remains intubated, no nausea/vomiting, no ostomy function. No output documented from NGT.  
3/2: S/p ex lap, descending colectomy and creation of colostomy on 3/1 then re-ex lap, drainage of intra-abdominal stool collection, descending colectomy with mobilization of the splenic flexure and transverse colostomy. Remains intubated on the vent postop. Nutritional intake adequate to meet patients estimated nutritional needs:  No  
 
Diet: DIET NUTRITIONAL SUPPLEMENTS Breakfast, Lunch, Dinner; ENSURE ENLIVE 
DIET DYSPHAGIA PUREED (NDD1) Food Allergies: NKFA Current Appetite: Fair Appetite/meal intake prior to admission: Fair, consuming soft foods; good meal intake PTA per family report Feeding Limitations:  [x] Swallowing difficulty: SLP following    [] Chewing difficulty    [x] Other: weakness, mouth pain- improving Current Meal Intake:  
Patient Vitals for the past 100 hrs: 
 % Diet Eaten 03/13/20 1600 25 % 03/13/20 1200 25 % 03/13/20 0800 25 % BM: 3/14, colostomy (0 mL output x past 24 hours and 25 mL output from FMS) Skin Integrity: abdominal surgical incision with wound VAC Edema:   [] No     [x] Yes Pertinent Medications: Reviewed: SSI, steroid, 10 units lantus, pantoprazole, levothyroxine po, furosemide, spironolactone, nystatin- refusing Recent Labs  
  03/16/20 
0226 03/15/20 
0226 03/14/20 0324  
 138 140  
K 3.3* 3.1* 3.5  100 105 CO2 32 32 29 * 130* 112* BUN 5* 6* 6*  
CREA 0.51* 0.49* 0.47* CA 8.2* 7.9* 7.8*  
MG 1.9 1.2* 1.5* PHOS 2.5 2.5 2.4* ALB  --  1.5* 1.4* SGOT  --  12 13 ALT  --  13 15 Intake/Output Summary (Last 24 hours) at 3/16/2020 1342 Last data filed at 3/16/2020 0500 Gross per 24 hour Intake 880 ml Output 2325 ml Net -1445 ml Anthropometrics: 
Ht Readings from Last 1 Encounters:  
03/15/20 5' 4\" (1.626 m) Last 3 Recorded Weights in this Encounter 03/15/20 5216 03/15/20 0800 03/16/20 8445 Weight: 84.3 kg (185 lb 13.6 oz) 84.3 kg (185 lb 13.6 oz) 82.4 kg (181 lb 9.6 oz) Body mass index is 31.17 kg/m². Obese, Class II Weight History: previously reported intentional weight loss and previous usual weight of 230 lb; patient reports recent usual weight of 175 lb prior to admission Weight Metrics 3/16/2020 2/28/2020 1/22/2020 1/17/2020 1/2/2020 12/16/2019 12/14/2019 Weight 181 lb 9.6 oz - 171 lb 11.2 oz - 172 lb 6.4 oz - 178 lb 9.6 oz BMI 31.17 kg/m2 28.57 kg/m2 - 28.57 kg/m2 - 28.69 kg/m2 - Some encounter information is confidential and restricted. Go to Review Flowsheets activity to see all data. Admitting Diagnosis: Septic shock ([facility] [address] [A41.9, R65.21] Perforated viscus [R19.8] Pertinent PMHx: COPD, HTN, DM, hepatitis C, sarcoidosis, methadone use, GERD with hiatal hernia, hypothyroidism Procedures: Spine Thoracic Laminectomy T6-T11 with Fusion (12/11/20) 2/2 compression fx of T9-T10 and spinal cord compression at T9 and T10 and spinal cord edema with subsequent paraparesis Education Needs:        [x] None identified  [] Identified - Not appropriate at this time  []  Identified and addressed - refer to education log Learning Limitations:   [x] None identified  [] Identified:  
Cultural, Rastafari & ethnic food preferences:  [x] None identified    [] Identified and addressed ESTIMATED NUTRITION NEEDS:  
 
Calories: 4523-6014 kcal (MSJx1.2-1.5) based on  [x] Actual BW 88 kg     [] IBW Protein:  gm (1-1.5 gm/kg) based on  [x] Actual BW      [] IBW Fluid: 1 mL/kcal 
  
MONITORING & EVALUATION:  
  
Nutrition Goal(s):  
- PO nutrition intake will meet >75% of patient estimated nutritional needs within the next 7 days. Outcome: Progressing towards goal   
- Patient potassium levels will maintain WNL of 3.5-5.5 mmol/L within the next 5-7 days. Outcome: New/Initial goal      
 
Monitoring:   [x] Food and nutrient intake   [x] Food and nutrient administration  [x] Comparative standards   [x] Nutrition-focused physical findings   [x] Anthropometric Measurements   [x] Treatment/therapy   [x] Biochemical data, medical tests, and procedures Previous Recommendations (for follow-up assessments only): Implemented Discharge Planning: Nutritional discharge needs unknown at this time. Participated in care planning, discharge planning, & interdisciplinary rounds as appropriate. Kunal Sim RD, [facility] Pager: [phone]

## 2020-03-16 NOTE — DIABETES MGMT
GLYCEMIC CONTROL PLAN OF CARE Assessment/Recommendations: 
Lab glucose this am 109 mg/dl Continue basal and corrective insulin coverage as ordered. Will continue inpatient monitoring. Most recent blood glucose values: 
Results for Diego Vyas (MRN [id number]) as of 3/16/2020 10:23 Ref. Range 3/15/2020 07:17 3/15/2020 11:09 3/15/2020 16:08 3/15/2020 21:04 3/16/2020 07:41 GLUCOSE,FAST - POC Latest Ref Range: 70 - 110 mg/dL 115 (H) 133 (H) 213 (H) 108 133 (H) Current A1C of 8.2 % is equivalent to average blood glucose of 189_mg/dl over the past 2-3 months. Current hospital diabetes medications:  
Lantus 10 units daily Lispro corrective insulin coverage every 6 hours as needed Previous day's insulin requirements:  
Lantus 10 units Lispro 6 units corrective insulin Home diabetes medications:  Per pta med list 
Lantus 20 units daily before breakfast 
Humalog AC based on her blood sugar reading Diet:   
NPO. Dysphagia pureed. supplements Education:  ____Refer to Diabetes Education Record  
          _x__Education not indicated at this time Deisi Aguilar RN CDE Ext [phone]

## 2020-03-16 NOTE — PROGRESS NOTES
0710: Received report from oZhra Candelario using SBAR. 1930: Bedside shift change report given to Laurie Harvey RN (oncoming nurse) by Zach Gutierres RN (offgoing nurse). Report included the following information SBAR, Intake/Output, MAR, Recent Results and Med Rec Status.

## 2020-03-16 NOTE — PROGRESS NOTES
Problem: Falls - Risk of 
Goal: *Absence of Falls Description: Document Han Figueroa Fall Risk and appropriate interventions in the flowsheet. Outcome: Progressing Towards Goal 
Note: Fall Risk Interventions: 
Mobility Interventions: Bed/chair exit alarm, Communicate number of staff needed for ambulation/transfer, OT consult for ADLs, Patient to call before getting OOB, PT Consult for mobility concerns, PT Consult for assist device competence, Strengthening exercises (ROM-active/passive), Utilize walker, cane, or other assistive device Mentation Interventions: Adequate sleep, hydration, pain control, Bed/chair exit alarm, Door open when patient unattended, Evaluate medications/consider consulting pharmacy, More frequent rounding, Reorient patient, Room close to nurse's station, Self-releasing belt, Toileting rounds, Update white board Medication Interventions: Bed/chair exit alarm Elimination Interventions: Bed/chair exit alarm, Call light in reach, Patient to call for help with toileting needs, Toileting schedule/hourly rounds History of Falls Interventions: Bed/chair exit alarm, Consult care management for discharge planning, Door open when patient unattended, Investigate reason for fall, Room close to nurse's station Problem: Patient Education: Go to Patient Education Activity Goal: Patient/Family Education Outcome: Progressing Towards Goal 
  
Problem: Pressure Injury - Risk of 
Goal: *Prevention of pressure injury Description: Document Florian Scale and appropriate interventions in the flowsheet.  
Outcome: Progressing Towards Goal 
Note: Pressure Injury Interventions: 
Sensory Interventions: Assess changes in LOC, Assess need for specialty bed, Avoid rigorous massage over bony prominences, Discuss PT/OT consult with provider, Float heels, Keep linens dry and wrinkle-free, Maintain/enhance activity level, Minimize linen layers, Monitor skin under medical devices, Pad between skin to skin, Pressure redistribution bed/mattress (bed type), Turn and reposition approx. every two hours (pillows and wedges if needed) Moisture Interventions: Absorbent underpads, Apply protective barrier, creams and emollients, Assess need for specialty bed, Check for incontinence Q2 hours and as needed, Contain wound drainage, Internal/External urinary devices, Limit adult briefs, Minimize layers Activity Interventions: Assess need for specialty bed, Pressure redistribution bed/mattress(bed type), PT/OT evaluation Mobility Interventions: Assess need for specialty bed, Float heels, Pressure redistribution bed/mattress (bed type), PT/OT evaluation, Suspension boots, Turn and reposition approx. every two hours(pillow and wedges) Nutrition Interventions: Document food/fluid/supplement intake, Discuss nutritional consult with provider, Offer support with meals,snacks and hydration Friction and Shear Interventions: Apply protective barrier, creams and emollients, Feet elevated on foot rest, Foam dressings/transparent film/skin sealants, Lift sheet, Lift team/patient mobility team, Minimize layers, Transferring/repositioning devices Problem: Patient Education: Go to Patient Education Activity Goal: Patient/Family Education Outcome: Progressing Towards Goal 
  
Problem: Injury - Risk of, Adverse Drug Event Goal: *Absence of adverse drug events Outcome: Progressing Towards Goal 
Goal: *Absence of medication errors Outcome: Progressing Towards Goal 
Goal: *Knowledge of prescribed medications Outcome: Progressing Towards Goal 
  
Problem: Patient Education: Go to Patient Education Activity Goal: Patient/Family Education Outcome: Progressing Towards Goal

## 2020-03-16 NOTE — PROGRESS NOTES
Pt on caseload for functional maintenance program, chart reviewed. Pt unable to participate with rehab technician due to: 
 
Patient was with nurses getting cleaned up. Will f/u later as patient's schedule allows. Thank you for this referral. 
Parviz Jennings, Rehab Technician

## 2020-03-16 NOTE — PROGRESS NOTES
[facility] Hospitalist Group Progress Note Patient: Beatriz Hdz Age: 62 y.o. : 1955 MR#: [id number] SSN: [id number] Date: 3/16/2020 Subjective/24-hour events:  
 
Appetite improving, tolerating PO without N/V or abdominal pain. Worked with PT and SLP this AM. Remains afebrile. Assessment:  
Sigmoid perforation with resulting peritonitis s/p emergent ex-lap 20 with re-exploration laparotomy and drainage of intra-abdominal stool collection, descending colectomy and transverse colostomy 3/1/20 Prevotella Sepsis with septic shock secondary to above - POA 
E. Coli UTI/cystitis Acute on chronic respiratory failure Acute on chronic diastolic CHF Paroxysmal atrial fibrillation Pulmonary HTN 
COPD with acute exacerbation Sarcoidosis on chronic prednisone therapy Electrolyte abnormalities:  Hypokalemia, hypophosphatemia DM 2 Recent T9-T10 compression fracture with spinal cord compression s/p T-spine laminectomy with fusion Paraparesis, secondary to above - patient is wheelchair bound ELIF resolved Polysubstance abuse hx Plan: 
Amiodarone continued with good result. Cardiology continuing to follow. Flush FMS and remove today if no significant output. Discussed with nursing. H/H stable, no need for transfusion. Monitor. Replace potassium and trend. PT/OT as able/tolerated. Will need SNF at discharge. Follow. Case discussed with:  [x]Patient  []Family  [x]Nursing  []Case Management DVT Prophylaxis:  [x]Eliquis  []Hep SQ  []SCDs  []Coumadin   []On Heparin gtt Objective:  
VS:  
Visit Vitals /81 (BP 1 Location: Left arm, BP Patient Position: At rest) Pulse 82 Temp 98.1 °F (36.7 °C) Resp 18 Ht 5' 4\" (1.626 m) Wt 82.4 kg (181 lb 9.6 oz) LMP 2012 (Exact Date) SpO2 95% BMI 31.17 kg/m² Tmax/24hrs: Temp (24hrs), Av.9 °F (36.6 °C), Min:97.4 °F (36.3 °C), Max:98.1 °F (36.7 °C) Intake/Output Summary (Last 24 hours) at 3/16/2020 1356 Last data filed at 3/16/2020 0500 Gross per 24 hour Intake 880 ml Output 2325 ml Net -1445 ml General:  In NAD. Nontoxic-appearing. Cardiovascular:  RRR. Pulmonary:  No wheezes. Effort nonlabored. GI:  Abdomen soft, NTTP. Extremities:  Warm, no ischemia. Neuro:  Awake/alert. Labs:   
Recent Results (from the past 24 hour(s)) GLUCOSE, POC Collection Time: 03/15/20  4:08 PM  
Result Value Ref Range Glucose (POC) 213 (H) 70 - 110 mg/dL GLUCOSE, POC Collection Time: 03/15/20  9:04 PM  
Result Value Ref Range Glucose (POC) 108 70 - 110 mg/dL MAGNESIUM Collection Time: 03/16/20  2:26 AM  
Result Value Ref Range Magnesium 1.9 1.6 - 2.6 mg/dL PHOSPHORUS Collection Time: 03/16/20  2:26 AM  
Result Value Ref Range Phosphorus 2.5 2.5 - 4.9 MG/DL  
METABOLIC PANEL, BASIC Collection Time: 03/16/20  2:26 AM  
Result Value Ref Range Sodium 138 136 - 145 mmol/L Potassium 3.3 (L) 3.5 - 5.5 mmol/L Chloride 101 100 - 111 mmol/L  
 CO2 32 21 - 32 mmol/L Anion gap 5 3.0 - 18 mmol/L Glucose 109 (H) 74 - 99 mg/dL BUN 5 (L) 7.0 - 18 MG/DL Creatinine 0.51 (L) 0.6 - 1.3 MG/DL  
 BUN/Creatinine ratio 10 (L) 12 - 20 GFR est AA >60 >60 ml/min/1.73m2 GFR est non-AA >60 >60 ml/min/1.73m2 Calcium 8.2 (L) 8.5 - 10.1 MG/DL  
CBC WITH AUTOMATED DIFF Collection Time: 03/16/20  2:26 AM  
Result Value Ref Range WBC 10.6 4.6 - 13.2 K/uL  
 RBC 3.52 (L) 4.20 - 5.30 M/uL HGB 9.5 (L) 12.0 - 16.0 g/dL HCT 29.2 (L) 35.0 - 45.0 % MCV 83.0 74.0 - 97.0 FL  
 MCH 27.0 24.0 - 34.0 PG  
 MCHC 32.5 31.0 - 37.0 g/dL  
 RDW 16.5 (H) 11.6 - 14.5 % PLATELET 234 (H) 736 - 420 K/uL MPV 10.0 9.2 - 11.8 FL  
 NEUTROPHILS 74 42 - 75 % BAND NEUTROPHILS 1 0 - 5 % LYMPHOCYTES 18 (L) 20 - 51 % MONOCYTES 6 2 - 9 % EOSINOPHILS 1 0 - 5 % BASOPHILS 0 0 - 3 %  
 ABS. NEUTROPHILS 8.0 1.8 - 8.0 K/UL ABS. LYMPHOCYTES 1.9 0.8 - 3.5 K/UL  
 ABS. MONOCYTES 0.6 0 - 1.0 K/UL  
 ABS. EOSINOPHILS 0.1 0.0 - 0.4 K/UL  
 ABS. BASOPHILS 0.0 0.0 - 0.06 K/UL  
 DF MANUAL PLATELET COMMENTS ADEQUATE PLATELETS    
 RBC COMMENTS ANISOCYTOSIS 1+ 
    
 RBC COMMENTS HYPOCHROMIA 1+ 
    
 RBC COMMENTS POIKILOCYTOSIS 1+ 
    
 RBC COMMENTS TARGET CELLS 2+ GLUCOSE, POC Collection Time: 03/16/20  7:41 AM  
Result Value Ref Range Glucose (POC) 133 (H) 70 - 110 mg/dL GLUCOSE, POC Collection Time: 03/16/20 11:50 AM  
Result Value Ref Range Glucose (POC) 221 (H) 70 - 110 mg/dL Signed By: Charlotte Brice MD   
 March 16, 2020

## 2020-03-16 NOTE — PROGRESS NOTES
Cardiology progress note CARDIOLOGY PROGRESS NOTE 
RECS: 
 
 
 
1. Paroxymal Atrial flutter/atrial fibrillation  with RVR- maintaining NSR- continue  amiodarone po. On Eliquis 5 mg bid. 2. Anemia- H&H slightly dropping Will monitor. 3. Acute respiratory Failure- intubated. Extubated 3/7/20 - on O2 by Nasal canula 4. Acute Sigmoid Colon Perforation - s/p Emergent Ex-Lap with drainage of intraabdominal stool and collections; sigmoid colectomy with Dr. Prisca Amador on 02/29/20.sigmoid perforation, secondary peritonitis  S/p Re-exploratory laparotomy, drainage of intra-abdominal stool collection, descending colectomy with mobilization of the splenic flexure, and transverse colostomy on 3/1. 
5. Septic Shock-improved  on antibiotics managed by [facility] and ID  
6. Acute on chronic diastolic heart failure-  compensated now SOB and BLE improved. 7. Hx sarcoidosis  
8. Pulmonary hypertension with PAP 80 mmHg on recent echo  
9. Abnormal TSH- medications per [facility] care 10. Hypokalemia- persistent- replete as needed  
 
 
 
       
02/29/20 ECHO ADULT FOLLOW-UP OR LIMITED 03/03/2020 3/3/2020  
  Narrative · Dilated right ventricle. Reduced systolic function. Abnormal right  
ventricular septal motion. Systolic flattening of interventricular septum  
consistent with right ventricle pressure overload. · Moderate tricuspid valve regurgitation is present. · Severely elevated central venous pressure (15+ mmHg); IVC diameter is  
larger than 21 mm and collapses less than 50% with respiration. · Severe pulmonary hypertension. · Pulmonary arterial systolic pressure is 43.5 mmHg · Normal cavity size, wall thickness and systolic function (ejection  
fraction normal). Estimated left ventricular ejection fraction is 55 -  
60%. · Dilated right atrium. 
   
    Signed by: Andrey Alanis MD  
 
 
 
ASSESSMENT: 
Hospital Problems  Date Reviewed: 2/28/2020 Codes Class Noted POA Perforated viscus ICD-10-CM: R19.8 ICD-9-CM: 799.89  2/29/2020 Unknown Septic shock (HCC) ICD-10-CM: A41.9, R65.21 ICD-9-CM: 038.9, 785.52, 995.92  2/29/2020 Unknown SUBJECTIVE: 
 
Patient alert. No c/o dyspnea. No chest pain feels better OBJECTIVE: 
 
VS:  
Visit Vitals /81 (BP 1 Location: Left arm, BP Patient Position: At rest) Pulse 82 Temp 98.1 °F (36.7 °C) Resp 18 Ht 5' 4\" (1.626 m) Wt 82.4 kg (181 lb 9.6 oz) SpO2 95% BMI 31.17 kg/m² Intake/Output Summary (Last 24 hours) at 3/16/2020 1158 Last data filed at 3/16/2020 0500 Gross per 24 hour Intake 880 ml Output 2325 ml Net -1445 ml  
 
TELE: normal sinus rhythm General: No acute distress. More alert today HENT: Normocephalic, atraumatic. Neck :  Supple Cardiac:  Normal S1/S2 Chest/Lungs: harsh breath sound left lower lung. RL lobe diminished. Wheezes lower lung Abdomen: Soft, distended non tender Extremities: Bilateral UP edema pedal edema noted. Labs: Results:  
   
Chemistry Recent Labs  
  03/16/20 
0226 03/15/20 
0226 03/14/20 
7698 * 130* 112*  138 140  
K 3.3* 3.1* 3.5  100 105 CO2 32 32 29 BUN 5* 6* 6*  
CREA 0.51* 0.49* 0.47* CA 8.2* 7.9* 7.8*  
MG 1.9 1.2* 1.5* PHOS 2.5 2.5 2.4* AGAP 5 6 6 BUCR 10* 12 13 AP  --  52 48 TP  --  5.9* 5.8* ALB  --  1.5* 1.4*  
GLOB  --  4.4* 4.4* AGRAT  --  0.3* 0.3* CBC w/Diff Recent Labs  
  03/16/20 
0226 03/15/20 
0226 03/14/20 
0324 WBC 10.6 9.9 9.2  
RBC 3.52* 3.19* 3.01* HGB 9.5* 8.4* 8.1* HCT 29.2* 26.3* 25.2*  
* 407 460* GRANS 74 74 80* LYMPH 18* 7* 16* EOS 1 0 0 Cardiac Enzymes No results for input(s): CPK, CKND1, SANTOS in the last 72 hours. No lab exists for component: [place] Coagulation No results for input(s): PTP, INR, APTT, INREXT, INREXT in the last 72 hours. Lipid Panel Lab Results Component Value Date/Time Cholesterol, total 141 09/30/2019 12:00 AM  
 HDL Cholesterol 39 (L) 09/30/2019 12:00 AM  
 LDL, calculated 61 09/30/2019 12:00 AM  
 VLDL, calculated 41 (H) 09/30/2019 12:00 AM  
 Triglyceride 204 (H) 09/30/2019 12:00 AM  
 CHOL/HDL Ratio 3.5 11/06/2016 04:18 AM  
  
BNP No results for input(s): BNPP in the last 72 hours. Liver Enzymes Recent Labs  
  03/15/20 
0226 TP 5.9* ALB 1.5* AP 52 SGOT 12 Digoxin Thyroid Studies Lab Results Component Value Date/Time  TSH 5.11 (H) 03/05/2020 06:25 PM  
    
 
 
 
Suma Parsons, NP -C

## 2020-03-16 NOTE — PROGRESS NOTES
Problem: Diabetes Self-Management Goal: *Disease process and treatment process Description: Define diabetes and identify own type of diabetes; list 3 options for treating diabetes. Outcome: Progressing Towards Goal 
Goal: *Incorporating nutritional management into lifestyle Description: Describe effect of type, amount and timing of food on blood glucose; list 3 methods for planning meals. Outcome: Progressing Towards Goal 
Goal: *Incorporating physical activity into lifestyle Description: State effect of exercise on blood glucose levels. Outcome: Progressing Towards Goal 
Goal: *Developing strategies to promote health/change behavior Description: Define the ABC's of diabetes; identify appropriate screenings, schedule and personal plan for screenings. Outcome: Progressing Towards Goal 
Goal: *Using medications safely Description: State effect of diabetes medications on diabetes; name diabetes medication taking, action and side effects. Outcome: Progressing Towards Goal 
Goal: *Monitoring blood glucose, interpreting and using results Description: Identify recommended blood glucose targets  and personal targets. Outcome: Progressing Towards Goal 
Goal: *Prevention, detection, treatment of acute complications Description: List symptoms of hyper- and hypoglycemia; describe how to treat low blood sugar and actions for lowering  high blood glucose level. Outcome: Progressing Towards Goal 
Goal: *Prevention, detection and treatment of chronic complications Description: Define the natural course of diabetes and describe the relationship of blood glucose levels to long term complications of diabetes. Outcome: Progressing Towards Goal 
Goal: *Developing strategies to address psychosocial issues Description: Describe feelings about living with diabetes; identify support needed and support network Outcome: Progressing Towards Goal 
Goal: *Insulin pump training Outcome: Progressing Towards Goal 
  
 Problem: Falls - Risk of 
Goal: *Absence of Falls Description: Document Fredo Gutierrze Fall Risk and appropriate interventions in the flowsheet. Outcome: Progressing Towards Goal 
Note: Fall Risk Interventions: 
Mobility Interventions: Assess mobility with egress test, Bed/chair exit alarm, Communicate number of staff needed for ambulation/transfer, OT consult for ADLs, Patient to call before getting OOB, PT Consult for mobility concerns, PT Consult for assist device competence Mentation Interventions: Adequate sleep, hydration, pain control, Bed/chair exit alarm, Door open when patient unattended, Evaluate medications/consider consulting pharmacy, More frequent rounding, Reorient patient, Room close to nurse's station, Self-releasing belt, Toileting rounds, Update white board Medication Interventions: Bed/chair exit alarm, Evaluate medications/consider consulting pharmacy, Patient to call before getting OOB, Teach patient to arise slowly Elimination Interventions: Bed/chair exit alarm, Call light in reach, Stay With Me (per policy), Patient to call for help with toileting needs, Toilet paper/wipes in reach, Toileting schedule/hourly rounds History of Falls Interventions: Bed/chair exit alarm, Consult care management for discharge planning, Door open when patient unattended, Evaluate medications/consider consulting pharmacy, Room close to nurse's station, Utilize gait belt for transfer/ambulation, Assess for delayed presentation/identification of injury for 48 hrs (comment for end date) Problem: Pressure Injury - Risk of 
Goal: *Prevention of pressure injury Description: Document Florian Scale and appropriate interventions in the flowsheet. Outcome: Progressing Towards Goal 
Note: Pressure Injury Interventions: 
Sensory Interventions: Assess changes in LOC, Assess need for specialty bed, Avoid rigorous massage over bony prominences, Check visual cues for pain, Discuss PT/OT consult with provider Moisture Interventions: Absorbent underpads, Apply protective barrier, creams and emollients, Assess need for specialty bed, Check for incontinence Q2 hours and as needed, Limit adult briefs, Maintain skin hydration (lotion/cream), Minimize layers, Moisture barrier, Contain wound drainage, Internal/External fecal devices, Internal/External urinary devices Activity Interventions: Assess need for specialty bed, Pressure redistribution bed/mattress(bed type), PT/OT evaluation Mobility Interventions: Assess need for specialty bed, Float heels, HOB 30 degrees or less, Pressure redistribution bed/mattress (bed type), PT/OT evaluation, Turn and reposition approx. every two hours(pillow and wedges), Suspension boots Nutrition Interventions: Document food/fluid/supplement intake, Discuss nutritional consult with provider, Offer support with meals,snacks and hydration Friction and Shear Interventions: Apply protective barrier, creams and emollients, Feet elevated on foot rest, Foam dressings/transparent film/skin sealants, HOB 30 degrees or less, Lift team/patient mobility team, Minimize layers, Transferring/repositioning devices Problem: Nutrition Deficit Goal: *Optimize nutritional status Outcome: Progressing Towards Goal 
  
Problem: Surgical Wound Care Goal: *Non-infected Wound: Absence of infection signs and symptoms Description: Infection control procedures (eg: clean dressings, clean gloves, hand washing, precautions to isolate wound from contamination, sterile instruments used for wound debridement) should be implemented. Outcome: Progressing Towards Goal 
Goal: *Infected Wound: Prevention of further infection and promotion of healing Description: Consider the use of systemic antibiotics in patients with cellulitis, osteomyelitis, bacteremia, or sepsis if there are no contraindications.  
Outcome: Progressing Towards Goal 
 Goal: *Improvement of existing wound and maintenance of skin integrity Outcome: Progressing Towards Goal 
  
Problem: Patient Education: Go to Patient Education Activity Goal: Patient/Family Education Outcome: Progressing Towards Goal

## 2020-03-16 NOTE — ROUTINE PROCESS
1915 Assumed care of patient from off going nurse. Patient resting in bed. No distress noted. Call bell within reach, siderails up x 3, bed in lowest position, and patient instructed to use call bell for assistance. Will continue to monitor. 3946 Bedside and Verbal shift change report given to Jonathan Sheth (oncoming nurse) by Kurt Elizabeth RN(offgoing nurse).  Report included the following information SBAR, Intake/Output, MAR, Recent Results and Cardiac Rhythm SR.

## 2020-03-17 NOTE — ROUTINE PROCESS
Received bedside report from Tomy, patient was resting in bed, watching television, HOB elevated, bed in lowest position and oriented to call button.

## 2020-03-17 NOTE — PROGRESS NOTES
Discharge planning: 
 
Patient is being recommended for skilled nursing placement. This writer will discuss options with patient and her family, especially given the fact that patient is Medicaid only. This writer will need to determine if patient has SNF coverage to her Medicaid. Her LTSS was already complete, by this writer. This writer will continue to monitor for discharge planning to ensure a safe discharge from [facility]. Agnes Tovar. MS Care Manager Pager#: [phone] vital signs/nurses notes

## 2020-03-17 NOTE — PROGRESS NOTES
Pt arrived on unit, incontinence care provided. Skin assessment performed with Karlee Negro RN. Sacral wound noted, ostomy site with new bag in place, lips dry and bleeding. Moisturizer provided, Midline wound vac dressing in place. Patient c/o 10/10 pain. Medications administered. Spoke with patient's daughter.  Will place patient on turn team.

## 2020-03-17 NOTE — PROGRESS NOTES
TRANSFER - OUT REPORT: 
 
Verbal report given to Magdalena RN(name) on Stephen Sinha  being transferred to CVT SD(unit) for routine progression of care Report consisted of patients Situation, Background, Assessment and  
Recommendations(SBAR). Information from the following report(s) SBAR, Procedure Summary, Intake/Output, MAR, Recent Results, Med Rec Status and Cardiac Rhythm NSR was reviewed with the receiving nurse. Lines:    
 
Opportunity for questions and clarification was provided. Patient transported with: 
 O2 @ 4 liters

## 2020-03-17 NOTE — PROGRESS NOTES
[facility] Progress Note Patient: Dell Wick Age: 62 y.o. : 1955 MR#: [id number] SSN: [id number] Date: 3/17/2020 Subjective/24-hour events: FMS removed yesterday but copious amounts of liquid stool continued to drain - tube put back in as a result. Assessment:  
Sigmoid perforation with resulting peritonitis s/p emergent ex-lap 20 with re-exploration laparotomy and drainage of intra-abdominal stool collection, descending colectomy and transverse colostomy 3/1/20 Prevotella Sepsis with septic shock secondary to above - POA 
E. Coli UTI/cystitis Acute on chronic respiratory failure Acute on chronic diastolic CHF Paroxysmal atrial fibrillation Pulmonary HTN 
COPD with acute exacerbation Sarcoidosis on chronic prednisone therapy Electrolyte abnormalities:  Hypokalemia, hypophosphatemia DM 2 Recent T9-T10 compression fracture with spinal cord compression s/p T-spine laminectomy with fusion Paraparesis, secondary to above - patient is wheelchair bound ELIF resolved Polysubstance abuse hx Plan: 
Discussed with surgery (Dr. Davide Tovar) via phone - advised removing output and monitoring output as some additional rectal stooling is to be expected. Lytes stable today - no need for replacement. Monitor. Continue current medical management as ordered o/w and therapy as able. SNF disposition at discharge. Case discussed with:  [x]Patient  []Family  [x]Nursing  []Case Management DVT Prophylaxis:  [x]Eliquis  []Hep SQ  []SCDs  []Coumadin   []On Heparin gtt Objective:  
VS:  
Visit Vitals /74 (BP 1 Location: Left arm, BP Patient Position: At rest) Pulse 86 Temp 97.7 °F (36.5 °C) Resp 18 Ht 5' 4\" (1.626 m) Wt 82.6 kg (182 lb) LMP 2012 (Exact Date) SpO2 94% BMI 31.24 kg/m² Tmax/24hrs: Temp (24hrs), Av.9 °F (36.6 °C), Min:97.4 °F (36.3 °C), Max:98.1 °F (36.7 °C) Intake/Output Summary (Last 24 hours) at 3/17/2020 1336 Last data filed at 3/17/2020 7295 Gross per 24 hour Intake 480 ml Output 1675 ml Net -1195 ml General:  In NAD. Nontoxic-appearing. Cardiovascular:  RRR. Pulmonary:  No wheezes. Effort nonlabored. GI:  Abdomen soft, NTTP. Extremities:  Warm, no ischemia. Neuro:  Awake/alert. Labs:   
Recent Results (from the past 24 hour(s)) GLUCOSE, POC Collection Time: 03/16/20  3:37 PM  
Result Value Ref Range Glucose (POC) 168 (H) 70 - 110 mg/dL GLUCOSE, POC Collection Time: 03/16/20  9:11 PM  
Result Value Ref Range Glucose (POC) 111 (H) 70 - 110 mg/dL MAGNESIUM Collection Time: 03/17/20  1:45 AM  
Result Value Ref Range Magnesium 1.7 1.6 - 2.6 mg/dL PHOSPHORUS Collection Time: 03/17/20  1:45 AM  
Result Value Ref Range Phosphorus 2.6 2.5 - 4.9 MG/DL  
METABOLIC PANEL, BASIC Collection Time: 03/17/20  1:45 AM  
Result Value Ref Range Sodium 139 136 - 145 mmol/L Potassium 3.7 3.5 - 5.5 mmol/L Chloride 101 100 - 111 mmol/L  
 CO2 34 (H) 21 - 32 mmol/L Anion gap 4 3.0 - 18 mmol/L Glucose 123 (H) 74 - 99 mg/dL BUN 5 (L) 7.0 - 18 MG/DL Creatinine 0.49 (L) 0.6 - 1.3 MG/DL  
 BUN/Creatinine ratio 10 (L) 12 - 20 GFR est AA >60 >60 ml/min/1.73m2 GFR est non-AA >60 >60 ml/min/1.73m2 Calcium 8.3 (L) 8.5 - 10.1 MG/DL  
CBC WITH AUTOMATED DIFF Collection Time: 03/17/20  1:45 AM  
Result Value Ref Range WBC 7.9 4.6 - 13.2 K/uL  
 RBC 3.76 (L) 4.20 - 5.30 M/uL  
 HGB 10.0 (L) 12.0 - 16.0 g/dL HCT 31.6 (L) 35.0 - 45.0 % MCV 84.0 74.0 - 97.0 FL  
 MCH 26.6 24.0 - 34.0 PG  
 MCHC 31.6 31.0 - 37.0 g/dL  
 RDW 16.5 (H) 11.6 - 14.5 % PLATELET 994 520 - 141 K/uL MPV 9.1 (L) 9.2 - 11.8 FL  
 NEUTROPHILS 74 42 - 75 % BAND NEUTROPHILS 10 (H) 0 - 5 % LYMPHOCYTES 10 (L) 20 - 51 % MONOCYTES 6 2 - 9 % EOSINOPHILS 0 0 - 5 % BASOPHILS 0 0 - 3 % ABS. NEUTROPHILS 6.6 1.8 - 8.0 K/UL  
 ABS. LYMPHOCYTES 0.8 0.8 - 3.5 K/UL  
 ABS. MONOCYTES 0.5 0 - 1.0 K/UL  
 ABS. EOSINOPHILS 0.0 0.0 - 0.4 K/UL  
 ABS. BASOPHILS 0.0 0.0 - 0.06 K/UL  
 DF AUTOMATED PLATELET COMMENTS ADEQUATE PLATELETS    
 RBC COMMENTS ANISOCYTOSIS 1+ GLUCOSE, POC Collection Time: 03/17/20  7:40 AM  
Result Value Ref Range Glucose (POC) 167 (H) 70 - 110 mg/dL GLUCOSE, POC Collection Time: 03/17/20 11:20 AM  
Result Value Ref Range Glucose (POC) 338 (H) 70 - 110 mg/dL Signed By: Edwin Restrepo MD   
 March 17, 2020

## 2020-03-17 NOTE — PROGRESS NOTES
Cardiology progress note CARDIOLOGY PROGRESS NOTE 
RECS: 
 
 
 
1. Paroxymal Atrial flutter/atrial fibrillation  with RVR- maintaining NSR- continue  amiodarone po. On Eliquis 5 mg bid. 2. Anemia- H&H slightly dropping Will monitor. 3. Hypertension- elevated added low dose Norvasc. 4. Acute respiratory Failure- intubated. Extubated 3/7/20 - on O2 by Nasal canula 5. Acute Sigmoid Colon Perforation - s/p Emergent Ex-Lap with drainage of intraabdominal stool and collections; sigmoid colectomy with Dr. Heather Jennings on 02/29/20.sigmoid perforation, secondary peritonitis  S/p Re-exploratory laparotomy, drainage of intra-abdominal stool collection, descending colectomy with mobilization of the splenic flexure, and transverse colostomy on 3/1. 
6. Septic Shock-improved  on antibiotics managed by [facility] and ID  
7. Acute on chronic diastolic heart failure-  compensated now SOB and BLE improved. 8. Hx sarcoidosis  
9. Pulmonary hypertension with PAP 80 mmHg on recent echo  
10. Abnormal TSH- medications per [facility] care 11. Hypokalemia- resolved CHF has significantly improved. Patient has mild to moderate metabolic alkalosis. Will add few days of acetazolamide. Repeat a limited echo to follow-up on pulmonary pressure. 
 
       
02/29/20 ECHO ADULT FOLLOW-UP OR LIMITED 03/03/2020 3/3/2020  
  Narrative · Dilated right ventricle. Reduced systolic function. Abnormal right  
ventricular septal motion. Systolic flattening of interventricular septum  
consistent with right ventricle pressure overload. · Moderate tricuspid valve regurgitation is present. · Severely elevated central venous pressure (15+ mmHg); IVC diameter is  
larger than 21 mm and collapses less than 50% with respiration. · Severe pulmonary hypertension. · Pulmonary arterial systolic pressure is 37.5 mmHg · Normal cavity size, wall thickness and systolic function (ejection fraction normal). Estimated left ventricular ejection fraction is 55 -  
60%. · Dilated right atrium. 
   
    Signed by: Caro Pendleton MD  
 
 
 
ASSESSMENT: 
Hospital Problems  Date Reviewed: 2/28/2020 Codes Class Noted POA Perforated viscus ICD-10-CM: R19.8 ICD-9-CM: 799.89  2/29/2020 Unknown Septic shock (HCC) ICD-10-CM: A41.9, R65.21 ICD-9-CM: 038.9, 785.52, 995.92  2/29/2020 Unknown SUBJECTIVE: 
Patient alert. No c/o dyspnea. No chest pain feels better OBJECTIVE: 
 
VS:  
Visit Vitals /82 (BP 1 Location: Left arm, BP Patient Position: At rest) Pulse 82 Temp 97.4 °F (36.3 °C) Resp 18 Ht 5' 4\" (1.626 m) Wt 82.6 kg (182 lb) SpO2 92% BMI 31.24 kg/m² Intake/Output Summary (Last 24 hours) at 3/17/2020 1029 Last data filed at 3/17/2020 5589 Gross per 24 hour Intake 480 ml Output 1675 ml Net -1195 ml TELE: normal sinus rhythm General: No acute distress. HENT: Normocephalic, atraumatic. Neck :  Supple Cardiac:  Normal S1/S2 Chest/Lungs: harsh breath sound left lower lung. RL lobe diminished. Abdomen: Soft, distended non tender Extremities: BLE edema resolved. Labs: Results:  
   
Chemistry Recent Labs  
  03/17/20 0145 03/16/20 0226 03/15/20 
9825 * 109* 130*  138 138  
K 3.7 3.3* 3.1*  
 101 100 CO2 34* 32 32 BUN 5* 5* 6*  
CREA 0.49* 0.51* 0.49* CA 8.3* 8.2* 7.9*  
MG 1.7 1.9 1.2*  
PHOS 2.6 2.5 2.5 AGAP 4 5 6 BUCR 10* 10* 12  
AP  --   --  52  
TP  --   --  5.9* ALB  --   --  1.5*  
GLOB  --   --  4.4* AGRAT  --   --  0.3* CBC w/Diff Recent Labs  
  03/17/20 0145 03/16/20 
0226 03/15/20 
0226 WBC 7.9 10.6 9.9  
RBC 3.76* 3.52* 3.19* HGB 10.0* 9.5* 8.4* HCT 31.6* 29.2* 26.3*  
 441* 407 GRANS 74 74 74 LYMPH 10* 18* 7* EOS 0 1 0 Cardiac Enzymes No results for input(s): CPK, CKND1, SANTOS in the last 72 hours. No lab exists for component: Yelena Curlin Coagulation No results for input(s): PTP, INR, APTT, INREXT, INREXT in the last 72 hours. Lipid Panel Lab Results Component Value Date/Time Cholesterol, total 141 09/30/2019 12:00 AM  
 HDL Cholesterol 39 (L) 09/30/2019 12:00 AM  
 LDL, calculated 61 09/30/2019 12:00 AM  
 VLDL, calculated 41 (H) 09/30/2019 12:00 AM  
 Triglyceride 204 (H) 09/30/2019 12:00 AM  
 CHOL/HDL Ratio 3.5 11/06/2016 04:18 AM  
  
BNP No results for input(s): BNPP in the last 72 hours. Liver Enzymes Recent Labs  
  03/15/20 
0226 TP 5.9* ALB 1.5* AP 52 SGOT 12 Digoxin Thyroid Studies Lab Results Component Value Date/Time TSH 5.11 (H) 03/05/2020 06:25 PM  
    
 
 
 
Suma E Halecki, NP -C I have independently evaluated and examined the patient. All relevant labs and testing data are reviewed. Care plan discussed and updated after review.  
Eva Watkins MD

## 2020-03-17 NOTE — PROGRESS NOTES
Pt is being transferred to CVT stepdown for appropriate level of care. Pt's daughter, Foreign Cooper, called & informed. Questions answered. Understanding verbalized.

## 2020-03-17 NOTE — PROGRESS NOTES
Problem: Mobility Impaired (Adult and Pediatric) Goal: *Acute Goals and Plan of Care (Insert Text) Description: Physical Therapy Goals Initiated 3/16/2020 and to be accomplished within 7 day(s) 1. Patient will move from supine to sit and sit to supine  and roll side to side in bed with moderate assistance . 2.  Patient will transfer from bed to chair and chair to bed with maximal assistance using the least restrictive device (SLIDING BOARD) 3. Patient will increase sitting balance to fair for increased safety with transfers. 4.  Patient will perform WC mobility 50 feet minimal assistance. Prior Level of Function:  
Patient was moderate assistance  for all mobility including bed mobility and sliding board transfers per last PT notes in 1/2020. Pt is nonambulatory. Patient lives alone with 24/7 care with nursing/aides in a single story home 4 steps to enter B handrail assist. Pt is dependent with stairs. She states she has a manual WC that she propels. She also states she has a bedside commode. Outcome: Progressing Towards Goal 
 PHYSICAL THERAPY TREATMENT Patient: Santos Hdz (65 y.o. female) Date: 3/17/2020 Diagnosis: Septic shock ([facility] [address] [A41.9, R65.21] Perforated viscus [R19.8] <principal problem not specified> Procedure(s) (LRB): 
LAPAROTOMY EXPLORATORY, DESCENDING COLECTOMY, CREATION OF COLOSTOMY (N/A) 16 Days Post-Op Precautions: Fall(Spinal sx (12/2019)) Chart, physical therapy assessment, plan of care and goals were reviewed. OBJECTIVE/ ASSESSMENT: 
Patient found supine in bed willing to work with PT. Pt reports she has back pain and would like to move. Pt found to be incontinent of urine thus rolled side to side for tyrell-care. Pt's Purewick not working and nurse Kit Cross notified. Pt then long sat in bed with mod A x2 for about 30 seconds before needing to return to supine.  Pt noted to appear SOB and SpO2 found to be between 84-88% even after about 2 minutes of rest. Nurse Ashley Minor again notified and pt was left supine with needs in reach. Progression toward goals: 
[]      Improving appropriately and progressing toward goals [x]      Improving slowly and progressing toward goals 
[]      Not making progress toward goals and plan of care will be adjusted PLAN: 
Patient continues to benefit from skilled intervention to address the above impairments. Continue treatment per established plan of care. Discharge Recommendations:  Jonathan Caldwell Further Equipment Recommendations for Discharge:  rolling walker / TBD SUBJECTIVE:  
Patient stated I don't think we can do what you want to do.  OBJECTIVE DATA SUMMARY:  
Critical Behavior: 
Neurologic State: Alert, Eyes open spontaneously Orientation Level: Oriented X4 Cognition: Appropriate for age attention/concentration, Follows commands, Recognition of people/places Safety/Judgement: Fall prevention Functional Mobility Training: 
Bed Mobility: 
Rolling: Maximum assistance Scooting: Maximum assistance;Assist x2 Balance: 
Sitting: Impaired Sitting - Static: Poor (constant support)(long sit) Pain: 
Pain level pre-treatment: Not rated Pain level post-treatment: Not rated Activity Tolerance:  
Fair Please refer to the flowsheet for vital signs taken during this treatment. After treatment:  
[] Patient left in no apparent distress sitting up in chair 
[x] Patient left in no apparent distress in bed 
[x] Call bell left within reach [x] Nursing notified 
[] Caregiver present 
[] Bed alarm activated 
[] SCDs applied COMMUNICATION/EDUCATION:  
[x]         Role of Physical Therapy 
[]         Fall prevention 
[x]         Bed mobility []         Transfers 
[]         Ambulation / gait []         Assistive device management 
[]         Stairs 
[]         Body mechanics []         Position change  
[]         Therapeutic exercise []         Activity pacing / energy conservation 
[]         Other: 
   
Lula Gaona PTA Time Calculation: 26 mins

## 2020-03-17 NOTE — PROGRESS NOTES
Problem: Dysphagia (Adult) Goal: *Acute Goals and Plan of Care (Insert Text) Description: Patient will: 1. Tolerate PO trials with 0 s/s overt distress in 4/5 trials 2. Utilize compensatory swallow strategies/maneuvers (decrease bite/sip, size/rate, alt. liq/sol) with min cues in 4/5 trials 3. Perform oral-motor/laryngeal exercises to increase oropharyngeal swallow function with min cues 4. Complete an objective swallow study (i.e., MBSS) to assess swallow integrity, r/o aspiration, and determine of safest LRD, min A as indicated/ordered by MD  
 
Recommend:  
Mech soft, thin liquids Meds as tolerated Aspiration precautions HOB >45 degrees during all intake and for at least 30 min after po Small bites/sips, slow rate of intake, alternating bites/sips Oral care three times daily Outcome: Progressing Towards Goal 
 
SPEECH LANGUAGE PATHOLOGY DYSPHAGIA TREATMENT Patient: Chanelle Fields (65 y.o. female) Date: 3/17/2020 Diagnosis: Septic shock ([facility] [address] [A41.9, R65.21] Perforated viscus [R19.8] <principal problem not specified> Procedure(s) (LRB): 
LAPAROTOMY EXPLORATORY, DESCENDING COLECTOMY, CREATION OF COLOSTOMY (N/A) 16 Days Post-Op Precautions: Aspiration, Fall(Spinal sx (12/2019)) PLOF: As per H&P   
 
ASSESSMENT: 
Pt seen for follow up dysphagia tx with puree, thin liquid breakfast meal. Pt continuing to refuse most of meal trays and refusing Nystatin. Pt agreeable to mech soft trials, demo mildly increased oral prep phase with scant oral residue post trials. Pt continuing to tolerate thin liquids with no overt s/sx aspiration. Recommend diet advancement to mech soft, thin liquids with encouragement with all intake. Will continue to follow for further dysphagia management. D/w pt and nursing. Progression toward goals: 
[]         Improving appropriately and progressing toward goals [x]         Improving slowly and progressing toward goals []         Not making progress toward goals and plan of care will be adjusted PLAN: 
Recommendations and Planned Interventions: 
Patient continues to benefit from skilled intervention to address the above impairments. Continue treatment per established plan of care. Discharge Recommendations: To Be Determined SUBJECTIVE:  
Patient stated I believe in [address]. He will take care of me (when asked about lack of intake) OBJECTIVE:  
Cognitive and Communication Status: 
Neurologic State: Alert Orientation Level: Oriented to person, Oriented to place Cognition: Follows commands Perception: Appears intact Perseveration: No perseveration noted Safety/Judgement: Fall prevention Dysphagia Treatment: 
Oral Assessment: 
Oral Assessment Labial: No impairment Dentition: Natural, Limited Oral Hygiene: Poor Lingual: Decreased strength, Decreased rate Velum: Unable to visualize Mandible: No impairment P.O. Trials: 
 Patient Position: 45 at [facility] Vocal quality prior to P.O.: No impairment Consistency Presented: Mechanical soft, Thin liquid, Puree How Presented: Self-fed/presented, Cup/sip, Straw, Successive swallows Bolus Acceptance: No impairment Bolus Formation/Control: Impaired Type of Impairment: Mastication, Delayed, Poor, Posterior Propulsion: Delayed (# of seconds), Discoordination Oral Residue: Less than 10% of bolus, Lingual 
 Initiation of Swallow: No impairment Laryngeal Elevation: Functional 
 Aspiration Signs/Symptoms: None Pharyngeal Phase Characteristics: Poor endurance Effective Modifications: Small sips and bites, Alternate liquids/solids Cues for Modifications: Moderate Oral Phase Severity: Mild-moderate Pharyngeal Phase Severity : Mild PAIN: 
Start of Tx: 0 End of Tx: 0 After treatment:  
[]              Patient left in no apparent distress sitting up in chair 
[x]              Patient left in no apparent distress in bed [x]              Call bell left within reach [x]              Nursing notified 
[]              Family present 
[]              Caregiver present 
[]              Bed alarm activated COMMUNICATION/EDUCATION:  
[x] Aspiration precautions; swallow safety; compensatory techniques []        Patient/family able to participate in training and education  
[]  Patient unable to participate in training and education, education ongoing with staff [x] Patient understands goals and intent of therapy; neutral about participation Martha Ambriz M.S., CCC-SLP Speech-Language Pathologist

## 2020-03-17 NOTE — ROUTINE PROCESS
Received bedside report from Bharatnorman, patient was resting in bed, watching television, HOB elevated, bed in lowest position and oriented to call button. Gave bedside report to Luz Ambriz RN, using SBAR, MAR, and Kardex.

## 2020-03-17 NOTE — PROGRESS NOTES
Problem: Falls - Risk of 
Goal: *Absence of Falls Description: Document Roland Acevedo Fall Risk and appropriate interventions in the flowsheet. Outcome: Progressing Towards Goal 
Note: Fall Risk Interventions: 
Mobility Interventions: Bed/chair exit alarm Mentation Interventions: Adequate sleep, hydration, pain control Medication Interventions: Bed/chair exit alarm Elimination Interventions: Bed/chair exit alarm, Call light in reach, Patient to call for help with toileting needs, Toileting schedule/hourly rounds History of Falls Interventions: Bed/chair exit alarm, Consult care management for discharge planning, Door open when patient unattended, Investigate reason for fall, Room close to nurse's station Problem: Patient Education: Go to Patient Education Activity Goal: Patient/Family Education Outcome: Progressing Towards Goal 
  
Problem: Pressure Injury - Risk of 
Goal: *Prevention of pressure injury Description: Document Florian Scale and appropriate interventions in the flowsheet. Outcome: Progressing Towards Goal 
Note: Pressure Injury Interventions: 
Sensory Interventions: Assess changes in LOC, Assess need for specialty bed, Avoid rigorous massage over bony prominences, Check visual cues for pain, Discuss PT/OT consult with provider, Keep linens dry and wrinkle-free, Maintain/enhance activity level, Minimize linen layers, Monitor skin under medical devices, Pad between skin to skin, Suspension boots, Turn and reposition approx. every two hours (pillows and wedges if needed) Moisture Interventions: Absorbent underpads, Apply protective barrier, creams and emollients, Assess need for specialty bed, Check for incontinence Q2 hours and as needed, Contain wound drainage, Internal/External fecal devices, Internal/External urinary devices, Limit adult briefs, Maintain skin hydration (lotion/cream), Minimize layers Activity Interventions: Assess need for specialty bed, Pressure redistribution bed/mattress(bed type), PT/OT evaluation Mobility Interventions: Assess need for specialty bed, Float heels, Pressure redistribution bed/mattress (bed type), PT/OT evaluation, Suspension boots, Turn and reposition approx. every two hours(pillow and wedges) Nutrition Interventions: Document food/fluid/supplement intake, Discuss nutritional consult with provider, Offer support with meals,snacks and hydration Friction and Shear Interventions: Apply protective barrier, creams and emollients, Lift sheet, Lift team/patient mobility team, Minimize layers, Transferring/repositioning devices Problem: Nutrition Deficit Goal: *Optimize nutritional status Outcome: Progressing Towards Goal 
  
Problem: Surgical Wound Care Goal: *Non-infected Wound: Absence of infection signs and symptoms Description: Infection control procedures (eg: clean dressings, clean gloves, hand washing, precautions to isolate wound from contamination, sterile instruments used for wound debridement) should be implemented. Outcome: Progressing Towards Goal 
Goal: *Improvement of existing wound and maintenance of skin integrity Outcome: Progressing Towards Goal

## 2020-03-17 NOTE — DIABETES MGMT
GLYCEMIC CONTROL PLAN OF CARE Assessment/Recommendations: 
Lab glucose this am 123 mg/dl Continue basal and corrective insulin coverage as ordered. Will continue inpatient monitoring. Most recent blood glucose values: 
Results for Mely Baez (MRN [id number]) as of 3/17/2020 11:07 Ref. Range 3/16/2020 07:41 3/16/2020 11:50 3/16/2020 15:37 3/16/2020 21:11 3/17/2020 07:40 GLUCOSE,FAST - POC Latest Ref Range: 70 - 110 mg/dL 133 (H) 221 (H) 168 (H) 111 (H) 167 (H) Current A1C of 8.2 % is equivalent to average blood glucose of 189_mg/dl over the past 2-3 months. Current hospital diabetes medications:  
Lantus 10 units daily Lispro corrective insulin coverage every 6 hours as needed Previous day's insulin requirements:  
Lantus 10 units Lispro 9 units corrective insulin Home diabetes medications:  Per pta med list 
Lantus 20 units daily before breakfast 
Humalog AC based on her blood sugar reading Diet:   
NPO. Dysphagia pureed. supplements Education:  ____Refer to Diabetes Education Record  
          _x__Education not indicated at this time Fredrick Damon RN CDE Ext [phone]

## 2020-03-17 NOTE — PROGRESS NOTES
Problem: Self Care Deficits Care Plan (Adult) Goal: *Acute Goals and Plan of Care (Insert Text) Description: Occupational Therapy Goals Initiated 03/10/2020, re-evaluated on 3/17/2020 within 7 day(s). 1.  Patient will perform self-feeding with supervision/set-up. 2.  Patient will perform upper body dressing with minimal assistance/contact guard assist. 
3.  Patient will perform functional task for 3 minutes while seated on EOB with moderate assistance. 4.  Patient will perform grooming task with minimal assistance. 5.  Patient will participate in upper extremity therapeutic exercise/activities with minimal assistance/contact guard assist for 8 minutes to increase strength/endurance for ADLs. Prior Level of Function: Per patient she was independent with upper body dressing, grooming, and self-feeding and required assistance for bathing/dressing. She used a wheelchair for functional mobility and was able to self propel. Patient was admitted in December and an ARU candidate where she was moderate assistance for transfers, supervision for UB bathing/dressing, and modified independent for feeding in January. Patient was able to sit EOB, with F+ balance. Outcome: Progressing Towards Goal 
 OCCUPATIONAL THERAPY RE-EVALUATION Patient: Modesto Maldonado (65 y.o. female) Date: 3/17/2020 Primary Diagnosis: Septic shock ([facility] [address] [A41.9, R65.21] Perforated viscus [R19.8] Procedure(s) (LRB): 
LAPAROTOMY EXPLORATORY, DESCENDING COLECTOMY, CREATION OF COLOSTOMY (N/A) 16 Days Post-Op Precautions:   Fall, Skin ASSESSMENT : 
Based on the objective data described below, the patient continues to present with decreased ADLs, decreased functional mobility and muscle weakness. Min to mod assist needed for UB self care and total assist given for LB tasks. Patient alert and able to actively participate in session.   Recommend co-tx with PT to increase safety/patient comfort with sitting EOB. Hospitalist in room at end of session to discuss medical issues with patient. She became tearful but continues to be motivated to increase her functional independence. Patient will benefit from skilled intervention to address the above impairments. Patient's rehabilitation potential is considered to be Fair Factors which may influence rehabilitation potential include:  
[]             None noted []             Mental ability/status [x]             Medical condition []             Home/family situation and support systems []             Safety awareness []             Pain tolerance/management 
[]             Other: PLAN : 
Recommendations and Planned Interventions:  
[x]               Self Care Training                  [x]      Therapeutic Activities [x]               Functional Mobility Training   []      Cognitive Retraining 
[x]               Therapeutic Exercises           [x]      Endurance Activities [x]               Balance Training                    []      Neuromuscular Re-Education []               Visual/Perceptual Training     [x]      Home Safety Training 
[x]               Patient Education                   [x]      Family Training/Education []               Other (comment): Frequency/Duration: Patient will be followed by occupational therapy 1-2 times per day/4-7 days per week to address goals. Discharge Recommendations: Jonathan Caldwell Further Equipment Recommendations for Discharge: N/A  
 
SUBJECTIVE:  
Patient stated I ate Giovanni Fink today.  OBJECTIVE DATA SUMMARY:  
Hospital course since last seen and reason for reevaluation: Patient was on an FMP for UE ROM but is now able to actively participate in session and benefit from skilled therapy. Past Medical History:  
Diagnosis Date Abnormally low high density lipoprotein (HDL) cholesterol with hypertriglyceridemia  Lipid profile (11/6/2016) showed , , HDL 38, LDL 37  
 Acute blood loss as cause of postoperative anemia 2019 Anxiety Bipolar affective disorder ([address] 2012 Cellulitis of right forearm 2017 Chronic back pain Chronic obstructive pulmonary disease (COPD) ([address] SOB, on paula O2 Recent admission with psychosis Chronic respiratory failure with hypoxia (HCC) On home oxygen inhalation at 3 LPM via NC Contusion of left elbow 2017 Depression Diabetic neuropathy associated with type 2 diabetes mellitus ([address] Diastolic dysfunction without heart failure Stable on diuretics Falls frequently Gastroesophageal reflux disease with hiatal hernia Generalized osteoarthritis of multiple sites Gout On Allopurinol History of acute renal failure 2013 History of back injury   
 jumped out of second story window History of fracture due to fall 2019 History of hepatitis C   
 treated History of penicillin allergy History of vitamin D deficiency 5/10/2017 Hypertensive heart disease without heart failure Better controlled Hyperuricemia 2017 Hypothyroidism Hypoxemia requiring supplemental oxygen 2014 Intravenous drug user 2017 Long term current use of aspirin Memory difficulty Menopause Mixed connective tissue disease ([address] 5/10/2017 Obesity, Class I, BMI 30-34.9 Olecranon bursitis of right elbow 2017 Opioid dependence ([address] On Methadone Recurrent genital herpes 2013 Right Achilles tendinitis 2017 Sarcoidosis Sickle cell trait ([address] Tobacco use disorder Type 2 diabetes mellitus with diabetic neuropathy, with long-term current use of insulin ([address] HbA1c (2019) = 7.1 Urge urinary incontinence 5/10/2017 Venous insufficiency Wears glasses Past Surgical History:  
Procedure Laterality Date [address]  HX  SECTION    
 HX CYST REMOVAL Left 1979 Left foot HX OTHER SURGICAL Left 04/17/2017 S/P incision and drainage of the abscess of the left hand and the left foot (4/17/2017 - Dr. Rosaline Humphreys Aas) HX THORACIC LAMINECTOMY  12/11/2019 S/P T10, T9, T8 laminectomy; T7, T8, T9, T10, T11, T12 posterior thoracic fusion; segmental spinal instrumentation; K2M Davis type, T7-T8, T11-T12 (12/11/2019 - Dr. Harsha Amin) HX TUBAL LIGATION Barriers to Learning/Limitations: None Compensate with: visual, verbal, tactile, kinesthetic cues/model Home Situation:  
Home Situation Home Environment: Private residence # Steps to Enter: 4(pt was dependent with stairs) Rails to Enter: Yes Hand Rails : Bilateral 
One/Two Story Residence: One story Living Alone: (has 24/7 nursing/aide care) Support Systems: (nursing/aide care 24/7) Patient Expects to be Discharged to[de-identified] Private residence Current DME Used/Available at Home: Wheelchair, Commode, bedside 
[x]  Right hand dominant   []  Left hand dominant Cognitive/Behavioral Status: 
Neurologic State: Alert Orientation Level: Oriented to person;Oriented to place;Oriented to situation Cognition: Follows commands Safety/Judgement: Fall prevention Skin: Intact on UEs Edema: None noted in UEs Vision/Perceptual:   
Acuity: Within Defined Limits Coordination: BUE Fine Motor Skills-Upper: Left Intact; Right Intact Gross Motor Skills-Upper: Left Intact; Right Intact Balance: 
Sitting: Impaired Sitting - Static: Poor (constant support)(long sit) Strength: BUE Strength: Generally decreased, functional(2+/5 in shoulder otherwise 3+/5 throughout) Tone & Sensation: BUE Tone: Normal 
Sensation: Intact Range of Motion: BUE 
AROM: Generally decreased, functional(decreased shoulder flexion/abduction) PROM: Within functional limits Functional Mobility and Transfers for ADLs: 
Bed Mobility: 
Rolling: Maximum assistance Scooting: Maximum assistance;Assist x2 Transfers: Toilet Transfer : (NT) 
 
ADL Assessment:  
Feeding: Minimum assistance Oral Facial Hygiene/Grooming: Moderate assistance Bathing: Maximum assistance Upper Body Dressing: Moderate assistance Lower Body Dressing: Total assistance Toileting: Total assistance Therapeutic Exercise: Pt was able to perform: 
 
EXERCISE Sets Reps Active Active Assist  
Passive Self- assisted ROM Comments Shoulder horizontal abduction  1 10  []  [x]  []  [] Shoulder flexion  1  10 []  [x]  []  [] Shoulder extension  1 10  []  [x]  []  [] Forearm extension/flexion 1 10 [x]  []  []  [] Shoulder external rotation  1 10  []  [x]  []  [] Shoulder internal rotation  1 10  []  [x]  []  []     
Wrist flexion/extension  1 10  [x]  []  []  [] Chair pushups     []  []  []  [] To increase strength/endurance for ADLs. Pain: 
Pain level pre-treatment: 0/10 Pain level post-treatment: 0/10 Pain Intervention(s): NA 
Response to intervention: NA Activity Tolerance:  
Good Please refer to the flowsheet for vital signs taken during this treatment. After treatment:  
[] Patient left in no apparent distress sitting up in chair 
[x] Patient left in no apparent distress in bed 
[x] Call bell left within reach [x] Nursing notified 
[] Caregiver present 
[] Bed alarm activated COMMUNICATION/EDUCATION:  
[x] Role of Occupational Therapy in the acute care setting 
[x] Home safety education was provided and the patient/caregiver indicated understanding. [x] Patient/family have participated as able in goal setting and plan of care. [x] Patient/family agree to work toward stated goals and plan of care. [] Patient understands intent and goals of therapy, but is neutral about his/her participation. [] Patient is unable to participate in goal setting and plan of care.  
 
Thank you for this referral. 
Radha Melendez MS OTR/L  
 Time Calculation: 25 mins

## 2020-03-18 NOTE — PROGRESS NOTES
[facility] Progress Note Patient: Alma Pedroza Age: 62 y.o. : 1955 MR#: [id number] SSN: [id number] Date: 3/18/2020 Subjective/24-hour events:  
 
Patient feels fine, denies any abdominal pain, no nausea or vomiting. Per RN, loose stools output from rectum but no output from colostomy since this morning Patient tolerating diet without any problem. Assessment:  
Sigmoid perforation with resulting peritonitis s/p emergent ex-lap 20 with re-exploration laparotomy and drainage of intra-abdominal stool collection, descending colectomy and transverse colostomy 3/1/20 Prevotella Sepsis with septic shock secondary to above - POA 
E. Coli UTI/cystitis Acute on chronic respiratory failure Acute on chronic diastolic CHF Paroxysmal atrial fibrillation Pulmonary HTN 
COPD with acute exacerbation Sarcoidosis on chronic prednisone therapy Electrolyte abnormalities:  Hypokalemia, hypophosphatemia DM 2 Recent T9-T10 compression fracture with spinal cord compression s/p T-spine laminectomy with fusion Paraparesis, secondary to above - patient is wheelchair bound ELIF resolved Polysubstance abuse hx Plan: We will clinically monitor, currently no signs of abdominal pain or distention. Per recommendation by Dr. Marilee Calvillo, expected to have some rectal stool after rectal management is removed. If patient tolerates any symptoms will reconsult surgical team. 
BS high, will increase Lantus Continue prednisone 30 mg daily per pulmonary BP high, increase Norvasc Continue anticoagulation Continue amiodarone, Lasix and Eliquis per cardiology. Continue Diamox per cardiology, metabolic alkalosis improving If clinically remained stable, transferred to telemetry bed later today. Continue PT OT 
SNF disposition at discharge. Case discussed with:  [x]Patient  []Family  [x]Nursing  []Case Management DVT Prophylaxis:  [x]Eliquis  []Hep SQ  []SCDs  []Coumadin   []On Heparin gtt Objective:  
VS:  
Visit Vitals /86 Pulse 73 Temp 98.3 °F (36.8 °C) Resp 18 Ht 5' 4\" (1.626 m) Wt 82.6 kg (182 lb) LMP 2012 (Exact Date) SpO2 100% BMI 31.24 kg/m² Tmax/24hrs: Temp (24hrs), Av °F (36.7 °C), Min:97.3 °F (36.3 °C), Max:98.5 °F (36.9 °C) Intake/Output Summary (Last 24 hours) at 3/18/2020 1314 Last data filed at 3/18/2020 1306 Gross per 24 hour Intake 340 ml Output  Net 340 ml General:  In NAD. Nontoxic-appearing. Cardiovascular:  RRR. Pulmonary: Bilateral clear to auscultation, no wheezing or rails GI: BS+, soft nontender, colostomy bag empty, no distention. Extremities: No edema. Neuro: AAO x3 Labs:   
Recent Results (from the past 24 hour(s)) GLUCOSE, POC Collection Time: 20  4:26 PM  
Result Value Ref Range Glucose (POC) 172 (H) 70 - 110 mg/dL MAGNESIUM Collection Time: 20  5:40 AM  
Result Value Ref Range Magnesium 1.4 (L) 1.6 - 2.6 mg/dL PHOSPHORUS Collection Time: 20  5:40 AM  
Result Value Ref Range Phosphorus 3.4 2.5 - 4.9 MG/DL  
METABOLIC PANEL, BASIC Collection Time: 20  5:40 AM  
Result Value Ref Range Sodium 136 136 - 145 mmol/L Potassium 3.3 (L) 3.5 - 5.5 mmol/L Chloride 100 100 - 111 mmol/L  
 CO2 30 21 - 32 mmol/L Anion gap 6 3.0 - 18 mmol/L Glucose 139 (H) 74 - 99 mg/dL BUN 7 7.0 - 18 MG/DL Creatinine 0.58 (L) 0.6 - 1.3 MG/DL  
 BUN/Creatinine ratio  GFR est AA >60 >60 ml/min/1.73m2 GFR est non-AA >60 >60 ml/min/1.73m2 Calcium 8.2 (L) 8.5 - 10.1 MG/DL  
CBC WITH AUTOMATED DIFF Collection Time: 20  5:40 AM  
Result Value Ref Range WBC 6.6 4.6 - 13.2 K/uL  
 RBC 3.66 (L) 4.20 - 5.30 M/uL HGB 9.6 (L) 12.0 - 16.0 g/dL HCT 30.3 (L) 35.0 - 45.0 % MCV 82.8 74.0 - 97.0 FL  
 MCH 26.2 24.0 - 34.0 PG  
 MCHC 31.7 31.0 - 37.0 g/dL RDW 16.6 (H) 11.6 - 14.5 % PLATELET 443 200 - 175 K/uL MPV 9.7 9.2 - 11.8 FL  
 NEUTROPHILS 55 42 - 75 % BAND NEUTROPHILS 7 (H) 0 - 5 % LYMPHOCYTES 31 20 - 51 % MONOCYTES 7 2 - 9 % EOSINOPHILS 0 0 - 5 % BASOPHILS 0 0 - 3 %  
 ABS. NEUTROPHILS 4.1 1.8 - 8.0 K/UL  
 ABS. LYMPHOCYTES 2.0 0.8 - 3.5 K/UL  
 ABS. MONOCYTES 0.5 0 - 1.0 K/UL  
 ABS. EOSINOPHILS 0.0 0.0 - 0.4 K/UL  
 ABS. BASOPHILS 0.0 0.0 - 0.06 K/UL  
 DF MANUAL PLATELET COMMENTS ADEQUATE PLATELETS    
 RBC COMMENTS ANISOCYTOSIS 1+ 
    
 RBC COMMENTS POLYCHROMASIA 1+ 
    
 RBC COMMENTS HYPOCHROMIA 1+ 
    
 RBC COMMENTS TARGET CELLS 1+ GLUCOSE, POC Collection Time: 03/18/20  7:52 AM  
Result Value Ref Range Glucose (POC) 134 (H) 70 - 110 mg/dL GLUCOSE, POC Collection Time: 03/18/20 11:06 AM  
Result Value Ref Range Glucose (POC) 184 (H) 70 - 110 mg/dL Signed By: Alfredo Mclain MD   
 March 18, 2020

## 2020-03-18 NOTE — PROGRESS NOTES
CM called and spoke to pt's daughter Emigdio Maciel [phone] regarding SNF choices. Emigdio Maciel would like to review facilities and discuss with pt's home nurse and call CM back with her choices. Luisito Cifuentes RN BSN Care Manager [phone]

## 2020-03-18 NOTE — PROGRESS NOTES
80 - Génesis-care being performed by nursing staff and pt declined PT at this time. 1110 - Pt continued to decline PT despite encouragement. Will follow up at later date.

## 2020-03-18 NOTE — PROGRESS NOTES
conducted a Follow up consultation and Spiritual Assessment for Sourav Zhong, who is a 62 y.o.,female. The  provided the following Interventions: 
Continued the relationship of care and support. Listened empathically. Offered prayer and assurance of continued prayer on patient's behalf. Chart reviewed. The following outcomes were achieved: 
Patient expressed gratitude for 's visit. Assessment: 
Patient denies any concerns because she relies on God's good intervention. \"God  is so good . .. all the time. \" Allergies Allergen Reactions  Moxifloxacin Rash  Pcn [Penicillins] Swelling  Sulfa (Sulfonamide Antibiotics) Swelling There are no further spiritual or Mandaen issues which require Spiritual Care Services interventions at this time. Plan: 
Chaplains will continue to follow and will provide pastoral care on an as needed/requested basis.  recommends bedside caregivers page  on duty if patient shows signs of acute spiritual or emotional distress. [facility] [address]  
[phone]

## 2020-03-18 NOTE — PROGRESS NOTES
Problem: Self Care Deficits Care Plan (Adult) Goal: *Acute Goals and Plan of Care (Insert Text) Description: Occupational Therapy Goals Initiated 03/10/2020, re-evaluated on 3/17/2020 within 7 day(s). 1.  Patient will perform self-feeding with supervision/set-up. 2.  Patient will perform upper body dressing with minimal assistance/contact guard assist. 
3.  Patient will perform functional task for 3 minutes while seated on EOB with moderate assistance. 4.  Patient will perform grooming task with minimal assistance. 5.  Patient will participate in upper extremity therapeutic exercise/activities with minimal assistance/contact guard assist for 8 minutes to increase strength/endurance for ADLs. Prior Level of Function: Per patient she was independent with upper body dressing, grooming, and self-feeding and required assistance for bathing/dressing. She used a wheelchair for functional mobility and was able to self propel. Patient was admitted in December and an ARU candidate where she was moderate assistance for transfers, supervision for UB bathing/dressing, and modified independent for feeding in January. Patient was able to sit EOB, with F+ balance. Outcome: Progressing Towards Goal 
 OCCUPATIONAL THERAPY TREATMENT Patient: Chanelle Fields (65 y.o. female) Date: 3/18/2020 Diagnosis: Septic shock ([facility] [address] [A41.9, R65.21] Perforated viscus [R19.8] <principal problem not specified> Procedure(s) (LRB): 
LAPAROTOMY EXPLORATORY, DESCENDING COLECTOMY, CREATION OF COLOSTOMY (N/A) 17 Days Post-Op Precautions: Fall, Skin Chart, occupational therapy assessment, plan of care, and goals were reviewed. ASSESSMENT: 
Pt seen at bed level for UE TherEx and ADL grooming and self-feeding tasks. Pt's deconditioning requires assist w/UE TherEx and frequent rest breaks. Pt educated on importance of UE TherEx and encouraged to preform throughout the day.  Pt educated on energy conservation techniques w/ADL, performing simple ADL grooming tasks w/increase time and SBA. Decrease FM/ strength requires assist w/container mgt and self-feeding. Pt w/non-productive cough s/p UE TherEx and bed level activity. Progression toward goals: 
[]          Improving appropriately and progressing toward goals [x]          Improving slowly and progressing toward goals 
[]          Not making progress toward goals and plan of care will be adjusted PLAN: 
Patient continues to benefit from skilled intervention to address the above impairments. Continue treatment per established plan of care. Discharge Recommendations:  Inpatient Rehab Further Equipment Recommendations for Discharge:  N/A  
 
SUBJECTIVE:  
Patient stated I was on life support. My daughter told me.  OBJECTIVE DATA SUMMARY:  
Cognitive/Behavioral Status: 
Neurologic State: Alert Orientation Level: Oriented X4 Cognition: Follows commands Safety/Judgement: Fall prevention Functional Mobility and Transfers for ADLs: 
ADL Intervention: 
Feeding Feeding Assistance: Minimum assistance Container Management: Maximum assistance Food to Mouth: Stand-by assistance Drink to Mouth: Stand-by assistance Grooming Position Performed: Long sitting on bed Washing Face: Stand-by assistance Washing Hands: Stand-by assistance UE Therapeutic Exercises:  
AAROM BUE shoulder flexion, horizontal ab/adduction AROM BUE elbow flexion/extension Pain: 
Pain level pre-treatment: 5/10 Pain level post-treatment: 5/10 Pt c/o back pain. Activity Tolerance:   
Poor Please refer to the flowsheet for vital signs taken during this treatment. After treatment:  
[]  Patient left in no apparent distress sitting up in chair 
[x]  Patient left in no apparent distress in bed 
[x]  Call bell left within reach [x]  Nursing notified 
[]  Caregiver present 
[]  Bed alarm activated COMMUNICATION/EDUCATION:  
[] Role of Occupational Therapy in the acute care setting [] Home safety education was provided and the patient/caregiver indicated understanding. [] Patient/family have participated as able in working towards goals and plan of care. [x] Patient/family agree to work toward stated goals and plan of care. [] Patient understands intent and goals of therapy, but is neutral about his/her participation. [] Patient is unable to participate in goal setting and plan of care. Thank you for this referral. 
SABINE Grayson Time Calculation: 23 mins

## 2020-03-18 NOTE — PROGRESS NOTES
Problem: Dysphagia (Adult) Goal: *Acute Goals and Plan of Care (Insert Text) Description: Patient will: 1. Tolerate PO trials with 0 s/s overt distress in 4/5 trials 2. Utilize compensatory swallow strategies/maneuvers (decrease bite/sip, size/rate, alt. liq/sol) with min cues in 4/5 trials 3. Perform oral-motor/laryngeal exercises to increase oropharyngeal swallow function with min cues 4. Complete an objective swallow study (i.e., MBSS) to assess swallow integrity, r/o aspiration, and determine of safest LRD, min A as indicated/ordered by MD  
 
Recommend:  
Soft solid, thin liquids Meds as tolerated Aspiration precautions HOB >45 degrees during all intake and for at least 30 min after po Small bites/sips, slow rate of intake, alternating bites/sips Oral care three times daily Outcome: Progressing Towards Goal 
 
SPEECH LANGUAGE PATHOLOGY DYSPHAGIA TREATMENT Patient: Chanelle Fields (65 y.o. female) Date: 3/18/2020 Diagnosis: Septic shock ([facility] [address] [A41.9, R65.21] Perforated viscus [R19.8] <principal problem not specified> Procedure(s) (LRB): 
LAPAROTOMY EXPLORATORY, DESCENDING COLECTOMY, CREATION OF COLOSTOMY (N/A) 17 Days Post-Op Precautions: Aspiration, Fall, Skin PLOF: Pt reports regular diet with thin liquids ASSESSMENT: 
Pt seen for follow up dysphagia tx. Pt stated \"I gotta get to better food\". Demo improved mastication/clearance with regular solid trials. Continues to demo mildly prolonged mastication and benefits from liquid wash to clear mild residuals. Tolerating thin liquids + straw with timely swallow initiation and adequate laryngeal elevation to palpation. Agreeable to diet advancement to soft solids, thin liquids as she reports \"they were chopping my meat for me the last time I was in rehab\". Educated with regard to diet recs, aspiration precautions, aspiration risk, oral care and positioning. Pt able to verbalize understanding. Will continue to follow for further dysphagia management. D/w nursing. Progression toward goals: 
[x]         Improving appropriately and progressing toward goals 
[]         Improving slowly and progressing toward goals 
[]         Not making progress toward goals and plan of care will be adjusted PLAN: 
Recommendations and Planned Interventions: 
Patient continues to benefit from skilled intervention to address the above impairments. Continue treatment per established plan of care. Discharge Recommendations:  Rehab SUBJECTIVE:  
Patient stated You're pretty. OBJECTIVE:  
Cognitive and Communication Status: 
Neurologic State: Alert Orientation Level: Disoriented to time Cognition: Follows commands Perception: Appears intact Perseveration: No perseveration noted Safety/Judgement: Fall prevention Dysphagia Treatment: 
Oral Assessment: 
Oral Assessment Labial: No impairment Dentition: Natural, Limited Oral Hygiene: Poor Lingual: Decreased strength, Decreased rate Velum: Unable to visualize Mandible: No impairment P.O. Trials: 
 Patient Position: 45 at [facility] Vocal quality prior to P.O.: No impairment Consistency Presented:  Regular solid, thin liquid How Presented: Self-fed/presented, Cup/sip, Straw, Successive swallows Bolus Acceptance: No impairment Bolus Formation/Control: Impaired Type of Impairment:  Delayed, Mastication Propulsion: Delayed (# of seconds), Discoordination Oral Residue: Less than 10% of bolus, Lingual 
 Initiation of Swallow: No impairment Laryngeal Elevation: Functional 
 Aspiration Signs/Symptoms: None Pharyngeal Phase Characteristics: Poor endurance Effective Modifications: Small sips and bites, Alternate liquids/solids Cues for Modifications: Moderate Oral Phase Severity:  Mild Pharyngeal Phase Severity : Mild PAIN: 
Start of Tx: 0 End of Tx: 0 After treatment: []              Patient left in no apparent distress sitting up in chair 
[x]              Patient left in no apparent distress in bed 
[x]              Call bell left within reach [x]              Nursing notified 
[]              Family present 
[]              Caregiver present 
[]              Bed alarm activated COMMUNICATION/EDUCATION:  
[x] Aspiration precautions; swallow safety; compensatory techniques [x]        Patient/family able to participate in training and education  
[]  Patient unable to participate in training and education, education ongoing with staff  
[] Patient understands goals and intent of therapy; neutral about participation Juliann Phan M.S., CCC-SLP Speech-Language Pathologist

## 2020-03-18 NOTE — PROGRESS NOTES
Cardiology progress note CARDIOLOGY PROGRESS NOTE 
RECS: 
 
 
 
1. Paroxymal Atrial flutter/atrial fibrillation  with RVR- maintaining NSR- continue  amiodarone po. On Eliquis 5 mg bid. 2. Anemia- H&H slightly dropping Will monitor. 3. Hypertension- elevated added low dose Norvasc. 4. Acute respiratory Failure- intubated. Extubated 3/7/20 - on O2 by Nasal canula 5. Acute Sigmoid Colon Perforation - s/p Emergent Ex-Lap with drainage of intraabdominal stool and collections; sigmoid colectomy with Dr. Grecia Sagastume on 02/29/20.sigmoid perforation, secondary peritonitis  S/p Re-exploratory laparotomy, drainage of intra-abdominal stool collection, descending colectomy with mobilization of the splenic flexure, and transverse colostomy on 3/1. 
6. Septic Shock-improved  on antibiotics managed by  and ID  
7. Acute on chronic diastolic heart failure-  compensated now SOB and BLE improved. 8. Hx sarcoidosis  
9. Pulmonary hypertension with PAP 80 mmHg on recent echo. Improved to 57 mm on repeat echo on 3/17/2020. 10. Abnormal TSH- medications per  care 11. Hypokalemia-replace as needed. CHF has significantly improved. Metabolic alkalosis is improving as well. Pulmonary pressure by repeat echo has significantly improved to 57 mm now and should be followed up in future as she improves further from her surgery. Cannot rule out a pulmonary embolism at the time of previous echo. Continue anticoagulation. 
 
       
02/29/20 ECHO ADULT FOLLOW-UP OR LIMITED 03/03/2020 3/3/2020  
  Narrative · Dilated right ventricle. Reduced systolic function. Abnormal right  
ventricular septal motion. Systolic flattening of interventricular septum  
consistent with right ventricle pressure overload. · Moderate tricuspid valve regurgitation is present. · Severely elevated central venous pressure (15+ mmHg); IVC diameter is  
larger than 21 mm and collapses less than 50% with respiration. · Severe pulmonary hypertension. · Pulmonary arterial systolic pressure is 88.3 mmHg · Normal cavity size, wall thickness and systolic function (ejection  
fraction normal). Estimated left ventricular ejection fraction is 55 -  
60%. · Dilated right atrium. 
   
    Signed by: Andrew Nunez MD  
 
 
 
ASSESSMENT: 
Hospital Problems  Date Reviewed: 2/28/2020 Codes Class Noted POA Perforated viscus ICD-10-CM: R19.8 ICD-9-CM: 799.89  2/29/2020 Unknown Septic shock (HCC) ICD-10-CM: A41.9, R65.21 ICD-9-CM: 038.9, 785.52, 995.92  2/29/2020 Unknown SUBJECTIVE: 
Patient alert. No c/o dyspnea. No chest pain feels better OBJECTIVE: 
 
VS:  
Visit Vitals /86 Pulse 73 Temp 98.3 °F (36.8 °C) Resp 18 Ht 5' 4\" (1.626 m) Wt 82.6 kg (182 lb) SpO2 100% BMI 31.24 kg/m² Intake/Output Summary (Last 24 hours) at 3/18/2020 1351 Last data filed at 3/18/2020 1306 Gross per 24 hour Intake 340 ml Output  Net 340 ml  
 
TELE: normal sinus rhythm General: No acute distress. HENT: Normocephalic, atraumatic. Neck :  Supple Cardiac:  Normal S1/S2 Chest/Lungs: Reduced at bases but no significant rales or rhonchi audible Abdomen: Soft, distended non tender Extremities: BLE edema resolved. Labs: Results:  
   
Chemistry Recent Labs  
  03/18/20 
0540 03/17/20 
0145 03/16/20 
5462 * 123* 109*  139 138  
K 3.3* 3.7 3.3*  
 101 101 CO2 30 34* 32 BUN 7 5* 5*  
CREA 0.58* 0.49* 0.51* CA 8.2* 8.3* 8.2* MG 1.4* 1.7 1.9 PHOS 3.4 2.6 2.5 AGAP 6 4 5 BUCR 12 10* 10* CBC w/Diff Recent Labs  
  03/18/20 
0540 03/17/20 
0145 03/16/20 
0226 WBC 6.6 7.9 10.6 RBC 3.66* 3.76* 3.52* HGB 9.6* 10.0* 9.5* HCT 30.3* 31.6* 29.2*  
 403 441* GRANS 55 74 74 LYMPH 31 10* 18* EOS 0 0 1 Cardiac Enzymes No results for input(s): CPK, CKND1, SANTOS in the last 72 hours.  
 
No lab exists for component: Linda Spann  
 Coagulation No results for input(s): PTP, INR, APTT, INREXT, INREXT in the last 72 hours. Lipid Panel Lab Results Component Value Date/Time Cholesterol, total 141 09/30/2019 12:00 AM  
 HDL Cholesterol 39 (L) 09/30/2019 12:00 AM  
 LDL, calculated 61 09/30/2019 12:00 AM  
 VLDL, calculated 41 (H) 09/30/2019 12:00 AM  
 Triglyceride 204 (H) 09/30/2019 12:00 AM  
 CHOL/HDL Ratio 3.5 11/06/2016 04:18 AM  
  
BNP No results for input(s): BNPP in the last 72 hours. Liver Enzymes No results for input(s): TP, ALB, TBIL, AP, SGOT, GPT in the last 72 hours. No lab exists for component: DBIL Digoxin Thyroid Studies Lab Results Component Value Date/Time  TSH 5.11 (H) 03/05/2020 06:25 PM  
    
 
 
 
Ama Bah MD

## 2020-03-18 NOTE — DIABETES MGMT
GLYCEMIC CONTROL PLAN OF CARE  
 
T2DM with A1c of 8.2% (2/20/2020). Home diabetes medications: Patient reported on 03/18/2020: 
Lantus 20 units daily before breakfast 
Humalog sliding scale insulin TID AC. Seen patient this morning. Prednisone 30 mg daily. POC BG range on 03/17/2020: 137-338. One BG above target range. POC BG report on 03/18/2020 at time of review: 134. Current hospital diabetes medications:  
Basal lantus insulin 10 units daily Lispro corrective insulin coverage every 6 hours as needed. Normal sensitivity dose. Previous day's insulin requirements: 03/17/2020: 
Lantus: 10 units Lispro: 21 units TDD insulin: 31 units Recommendation(s):  
1.) consider increasing basal lantus insulin to 12 units daily. Assessment: 
Patient is 62year old with past medical history including type 2 diabetes mellitus with neuropathy, COPD on home oxygen, diastolic heart failure, hypothyroidism, tobacco disorder, opioid dependence on methadone, mixed connective tissue disease, treated hep C, acute renal failure, GERD, anxiety and bipolar disorder - was admitted on 2/29/2020 with report of severe abd pain associated with worsening shortness of breath. Noted: 
Sigmoid perforation resulting in peritonitis s/p emergent exploratory lap on 2/29/2020 with re expl laparotomy and drainage of intra-abd stool collection, descending colectomy and transverse colostomy on 3/01/2020. Sepsis with septic shock. E. Coli UTI. Acute resp failure. Acute on chronic diastolic CHF. COPD with acute exacerbation. H/O polysubstance abuse. Wheelchair bound, paraparesis with recent thoracic compression fracture with spinal cord compression s/p gibson with fusion. 
T2DM. Most recent blood glucose values: 
 
Results for Chauncey Lorenzo (MRN [id number]) as of 3/18/2020 10:50 Ref. Range 3/17/2020 07:40 3/17/2020 11:20 3/17/2020 16:26  
GLUCOSE,FAST - POC Latest Ref Range: 70 - 110 mg/dL 167 (H) 338 (H) 172 (H) Results for Chauncey Lorenzo (MRN [id number]) as of 3/18/2020 10:50 Ref. Range 3/18/2020 07:52 GLUCOSE,FAST - POC Latest Ref Range: 70 - 110 mg/dL 134 (H) Current A1C of 8.2 % (2/20/2020) which is equivalent to average blood glucose of 189mg/dl over the past 2-3 months. Diet:  Dysphagia pureed. Supplements Education:  ____Refer to Diabetes Education Record  
          _x__Education not indicated at this time Lacy Thomason RN [facility] Pager: [phone]

## 2020-03-18 NOTE — PROGRESS NOTES
1930 bedside turnover given to me by RN [employer]. Pt is on the cardiac telemetry monitor, in stable condition, denies shortness of breath and denies pain, she is watching tv with the call bell within reach on the bedside table. 2020 changed patient she was wet and her fecal management bag is leaking terribly. Vitals assessed and WNL. Bed is in the lowest position with the wheels locked and call bell on the bedside table. 2115

## 2020-03-19 NOTE — PROGRESS NOTES
Problem: Mobility Impaired (Adult and Pediatric) Goal: *Acute Goals and Plan of Care (Insert Text) Description: Physical Therapy Goals Initiated 3/16/2020 and to be accomplished within 7 day(s) 1. Patient will move from supine to sit and sit to supine  and roll side to side in bed with moderate assistance . 2.  Patient will transfer from bed to chair and chair to bed with maximal assistance using the least restrictive device (SLIDING BOARD) 3. Patient will increase sitting balance to fair for increased safety with transfers. 4.  Patient will perform WC mobility 50 feet minimal assistance. Prior Level of Function:  
Patient was moderate assistance  for all mobility including bed mobility and sliding board transfers per last PT notes in 1/2020. Pt is nonambulatory. Patient lives alone with 24/7 care with nursing/aides in a single story home 4 steps to enter B handrail assist. Pt is dependent with stairs. She states she has a manual WC that she propels. She also states she has a bedside commode. Outcome: Progressing Towards Goal 
 PHYSICAL THERAPY TREATMENT Patient: Santos Hdz (65 y.o. female) Date: 3/19/2020 Diagnosis: Septic shock ([facility] [address] [A41.9, R65.21] Perforated viscus [R19.8] <principal problem not specified> Procedure(s) (LRB): 
LAPAROTOMY EXPLORATORY, DESCENDING COLECTOMY, CREATION OF COLOSTOMY (N/A) 18 Days Post-Op Precautions: Fall, Skin Chart, physical therapy assessment, plan of care and goals were reviewed. OBJECTIVE/ ASSESSMENT: 
Patient found supine in bed willing to work with PT. Patient seen with OT to maximize safety of patient and staff. Pt continues to present with self limiting behavior and conflicting statements about her function. Pt reports she is paralyzed, but can move her legs. She also states she can not feel anything, but states yes when asked if she can feel touch from this LPTA in all parts of her legs.  Last tx session pt was able to perform ankle pumps, but today she states she is unable (noted to be activating hip flexors when attempting.) Pt sat at EOB for ~ 5 minutes continually asking to lie down, and eventually began to return herself to supine. Pt was scooted toward [facility] with 2 person draw sheet transfer and needs were left in reach. Progression toward goals: 
[]      Improving appropriately and progressing toward goals [x]      Improving slowly and progressing toward goals 
[]      Not making progress toward goals and plan of care will be adjusted PLAN: 
Patient continues to benefit from skilled intervention to address the above impairments. Continue treatment per established plan of care. Discharge Recommendations:  Jonathan Caldwell Further Equipment Recommendations for Discharge:  rolling walker / TBD SUBJECTIVE:  
Patient stated I need to lay down.  OBJECTIVE DATA SUMMARY:  
Critical Behavior: 
Neurologic State: Alert Orientation Level: Oriented X4 Cognition: Follows commands Safety/Judgement: Fall prevention Functional Mobility Training: 
Bed Mobility: 
Rolling: Maximum assistance Supine to Sit: Maximum assistance;Assist x2 Sit to Supine: Maximum assistance;Assist x2 Balance: 
Sitting: Impaired Sitting - Static: Fair (occasional) Sitting - Dynamic: Fair (occasional)(-) Therapeutic Exercises:  
 
 
EXERCISE Sets Reps Active Active Assist  
Passive Self- assited ROM Comments Ankle Pumps   [] [] [] [] Quad Sets   [] [] [] [] Glut Sets   [] [] [] [] Short Arc Quads   [] [] [] [] Heel Slides   [] [] [] [] Straight Leg Raises   [] [] [] [] Hip Abd   [] [] [] [] Long Arc Quads 1 4 [x] [] [] [] Bilaterally Seated Marching   [] [] [] [] Seated Knee Flexion   [] [] [] []   
Standing Marching   [] [] [] []   
 
Pain: 
Pain level pre-treatment: Not rated Pain level post-treatment: Not rated Activity Tolerance:  
Poor Please refer to the flowsheet for vital signs taken during this treatment. After treatment:  
[] Patient left in no apparent distress sitting up in chair 
[x] Patient left in no apparent distress in bed 
[x] Call bell left within reach 
[] Nursing notified 
[] Caregiver present 
[] Bed alarm activated 
[] SCDs applied COMMUNICATION/EDUCATION:  
[x]         Role of Physical Therapy 
[]         Fall prevention 
[x]         Bed mobility []         Transfers 
[]         Ambulation / gait []         Assistive device management 
[]         Stairs [x]         Body mechanics [x]         Position change  
[x]         Therapeutic exercise 
[]         Activity pacing / energy conservation 
[]         Other: 
   
Mayank Rodriguez PTA Time Calculation: 24 mins

## 2020-03-19 NOTE — PROGRESS NOTES
Called patients daughter Emre Leonardo [phone] about LTC vs SNF. Patient does NOT have a SNF benefit. Daughter refusing LTC and does not want to give up patients check. Patient has 63 hours per week of personal care from [facility] of choice obtained for [facility] care. Will continue to monitor for discharge needs. Carlos Lewis RN BSN Care Manager [phone]

## 2020-03-19 NOTE — PROGRESS NOTES
1216 
Patient c/o muscle cramps. Patient verbalizes have them at home and takes a muscle relaxer. Dr Jumana Avilez notified of patient c/o leg cramps. No new orders rec'd. 
0130 Bedside and Verbal shift change report given to Anand Rojo RN (oncoming nurse) by Bob Borges (offgoing nurse). Report included the following information SBAR, Kardex, MAR and Recent Results.

## 2020-03-19 NOTE — PROGRESS NOTES
Problem: Self Care Deficits Care Plan (Adult) Goal: *Acute Goals and Plan of Care (Insert Text) Description: Occupational Therapy Goals Initiated 03/10/2020, re-evaluated on 3/17/2020 within 7 day(s). 1.  Patient will perform self-feeding with supervision/set-up. 2.  Patient will perform upper body dressing with minimal assistance/contact guard assist. 
3.  Patient will perform functional task for 3 minutes while seated on EOB with moderate assistance. 4.  Patient will perform grooming task with minimal assistance. 5.  Patient will participate in upper extremity therapeutic exercise/activities with minimal assistance/contact guard assist for 8 minutes to increase strength/endurance for ADLs. Prior Level of Function: Per patient she was independent with upper body dressing, grooming, and self-feeding and required assistance for bathing/dressing. She used a wheelchair for functional mobility and was able to self propel. Patient was admitted in December and an ARU candidate where she was moderate assistance for transfers, supervision for UB bathing/dressing, and modified independent for feeding in January. Patient was able to sit EOB, with F+ balance. Outcome: Progressing Towards Goal 
 OCCUPATIONAL THERAPY TREATMENT Patient: Kaylene Clark (65 y.o. female) Date: 3/19/2020 Diagnosis: Septic shock ([facility] [address] [A41.9, R65.21] Perforated viscus [R19.8] <principal problem not specified> Procedure(s) (LRB): 
LAPAROTOMY EXPLORATORY, DESCENDING COLECTOMY, CREATION OF COLOSTOMY (N/A) 18 Days Post-Op Precautions: Fall, Skin Chart, occupational therapy assessment, plan of care, and goals were reviewed. ASSESSMENT: 
Pt co-treated w/PT to maximize safety w/EOB activity. Pt requires 2 person assist w/bed mobility to maneuver to EOB.  Pt tolerates ~ 5 minutes static sitting w/max encouragement and vc's for cervical extension and bilateral shoulder retraction. Pt c/o fatigue and returned to supine w/2 person assist. Pt performs UB ADL at bed level. (see functional levels below) Pt unable to maintain BUE shoulder flexion requiring assist w/axillary hygiene. Pt appears to be self-limiting requiring vc's for sequencing UB dressing, donning hospital gown. Educated pt on importance UE TherEx and encouraged to perform throughout the day. Progression toward goals: 
[]          Improving appropriately and progressing toward goals [x]          Improving slowly and progressing toward goals 
[]          Not making progress toward goals and plan of care will be adjusted PLAN: 
Patient continues to benefit from skilled intervention to address the above impairments. Continue treatment per established plan of care. Discharge Recommendations:  Jonathan Caldwell Further Equipment Recommendations for Discharge:  N/A  
 
SUBJECTIVE:  
Patient stated I would just use that board thing.  reference transfer to w/c OBJECTIVE DATA SUMMARY:  
Cognitive/Behavioral Status: 
Neurologic State: Alert Orientation Level: Oriented X4 Cognition: Follows commands Safety/Judgement: Fall prevention Functional Mobility and Transfers for ADLs: 
 Bed Mobility: 
Rolling: Maximum assistance Supine to Sit: Maximum assistance;Assist x2 Sit to Supine: Maximum assistance;Assist x2 Balance: 
Sitting: Impaired Sitting - Static: Fair (occasional) Sitting - Dynamic: Fair (occasional)(-) ADL Intervention: 
Feeding Feeding Assistance: Minimum assistance Container Management: Moderate assistance Drink to Mouth: Stand-by assistance Grooming Position Performed: Long sitting on bed Washing Face: Supervision Washing Hands: Supervision Upper Body Bathing Bathing Assistance: Maximum assistance Position Performed: Long sitting on bed Upper Body Dressing Assistance Hospital Gown: Maximum assistance Pain: 
Pain level pre-treatment: 0/10 Pain level post-treatment: 0/10 Activity Tolerance:   
Poor Please refer to the flowsheet for vital signs taken during this treatment. After treatment:  
[]  Patient left in no apparent distress sitting up in chair 
[x]  Patient left in no apparent distress in bed 
[x]  Call bell left within reach 
[]  Nursing notified 
[]  Caregiver present 
[]  Bed alarm activated COMMUNICATION/EDUCATION:  
[] Role of Occupational Therapy in the acute care setting 
[] Home safety education was provided and the patient/caregiver indicated understanding. [] Patient/family have participated as able in working towards goals and plan of care. [x] Patient/family agree to work toward stated goals and plan of care. [] Patient understands intent and goals of therapy, but is neutral about his/her participation. [] Patient is unable to participate in goal setting and plan of care. Thank you for this referral. 
SABINE Rinaldi Time Calculation: 24 mins

## 2020-03-19 NOTE — PROGRESS NOTES
[facility] Hospitalist Group Progress Note Patient: Lina Sanchez Age: 62 y.o. : 1955 MR#: [id number] SSN: [id number] Date/Time: 3/19/2020 Subjective:  
 
Pt c/o cramping in leg, has no other specific complaints. Assessment/Plan:  
62 y o female w/ multiple med conditions including COPD on 3L, sarcoidosis, hep C, spinal cord compression admitted to the ICU after presenting to the ED w/ cc of abd pain and after she was noted to have septic shock, sigmoid colon perf, peritonitis. 
 
-Sigmoid perforation, peritonitis s/p emergent ex-lap w/ drainage of intraabdominal stool and abscess, sigmoid colectomy on 20, and re ex lap, drainage of intra abdominal stool collection, descending colectomy w/ mobilization of splenic flexure and transverse colostomy on 3/1/20. pt left with open closure w/ MOHINDER drain. Surgery signed off on 3/9. PLAN: wound care w/ wound vac and colostomy care. Discussed w/ CM, surgeon. CM to assist w/ setting up wound vac in preparation for d/c, surgeon aware of plans for d/c 
 
-Septic shock POA thought to be due to Prevotella disiens in the setting of gut perforation, shock resolved. -S.epidermidis +ve blood culture likely contaminant 
 
-E.coli UTI 
 
-Acute on chronic respiratory failure emergently intubated in ED, extubated on 3/8/20 on chronic 3L NC 
 
-Acute on chronic diastolic heart failure 
 
-P-afib on eliquis Additional Notes:   
 
Case discussed with:  []Patient  []Family  []Nursing  []Case Management DVT Prophylaxis:  []Lovenox  []Hep SQ  []SCDs  []Coumadin   []On Heparin gtt Objective:  
VS:  
Visit Vitals /82 (BP 1 Location: Left arm, BP Patient Position: At rest) Pulse 87 Temp 99.1 °F (37.3 °C) Resp 16 Ht 5' 4\" (1.626 m) Wt 74.9 kg (165 lb 3.2 oz) LMP 2012 (Exact Date) SpO2 100% BMI 28.36 kg/m² Tmax/24hrs: Temp (24hrs), Av.3 °F (36.8 °C), Min:97.6 °F (36.4 °C), Max:99.2 °F (37.3 °C) Input/Output:  
 
Intake/Output Summary (Last 24 hours) at 3/19/2020 1314 Last data filed at 3/19/2020 1257 Gross per 24 hour Intake 720 ml Output 1500 ml Net -780 ml General: alert, awake, in NAD Cardiovascular:  Rrr, no murmurs Pulmonary: ctab GI: soft, nt, nd  
Extremities: no edema Additional:   
 
Labs:   
Recent Results (from the past 24 hour(s)) GLUCOSE, POC Collection Time: 03/18/20  4:02 PM  
Result Value Ref Range Glucose (POC) 283 (H) 70 - 110 mg/dL GLUCOSE, POC Collection Time: 03/18/20  9:23 PM  
Result Value Ref Range Glucose (POC) 302 (H) 70 - 110 mg/dL MAGNESIUM Collection Time: 03/19/20  2:42 AM  
Result Value Ref Range Magnesium 1.6 1.6 - 2.6 mg/dL PHOSPHORUS Collection Time: 03/19/20  2:42 AM  
Result Value Ref Range Phosphorus 2.7 2.5 - 4.9 MG/DL  
METABOLIC PANEL, BASIC Collection Time: 03/19/20  2:42 AM  
Result Value Ref Range Sodium 139 136 - 145 mmol/L Potassium 4.0 3.5 - 5.5 mmol/L Chloride 108 100 - 111 mmol/L  
 CO2 25 21 - 32 mmol/L Anion gap 6 3.0 - 18 mmol/L Glucose 73 (L) 74 - 99 mg/dL BUN 7 7.0 - 18 MG/DL Creatinine 0.54 (L) 0.6 - 1.3 MG/DL  
 BUN/Creatinine ratio 13 12 - 20 GFR est AA >60 >60 ml/min/1.73m2 GFR est non-AA >60 >60 ml/min/1.73m2 Calcium 8.3 (L) 8.5 - 10.1 MG/DL  
CBC WITH AUTOMATED DIFF Collection Time: 03/19/20  2:42 AM  
Result Value Ref Range WBC 7.0 4.6 - 13.2 K/uL  
 RBC 3.66 (L) 4.20 - 5.30 M/uL HGB 9.7 (L) 12.0 - 16.0 g/dL HCT 30.5 (L) 35.0 - 45.0 % MCV 83.3 74.0 - 97.0 FL  
 MCH 26.5 24.0 - 34.0 PG  
 MCHC 31.8 31.0 - 37.0 g/dL  
 RDW 16.6 (H) 11.6 - 14.5 % PLATELET 342 384 - 395 K/uL MPV 9.2 9.2 - 11.8 FL  
 NEUTROPHILS 53 42 - 75 % BAND NEUTROPHILS 1 0 - 5 % LYMPHOCYTES 40 20 - 51 % MONOCYTES 5 2 - 9 % EOSINOPHILS 1 0 - 5 % BASOPHILS 0 0 - 3 %  
 ABS. NEUTROPHILS 3.7 1.8 - 8.0 K/UL  
 ABS. LYMPHOCYTES 2.8 0.8 - 3.5 K/UL ABS. MONOCYTES 0.4 0 - 1.0 K/UL  
 ABS. EOSINOPHILS 0.1 0.0 - 0.4 K/UL  
 ABS. BASOPHILS 0.0 0.0 - 0.06 K/UL  
 DF MANUAL PLATELET COMMENTS ADEQUATE PLATELETS    
 RBC COMMENTS ANISOCYTOSIS 1+ GLUCOSE, POC Collection Time: 03/19/20  8:26 AM  
Result Value Ref Range Glucose (POC) 142 (H) 70 - 110 mg/dL GLUCOSE, POC Collection Time: 03/19/20 11:46 AM  
Result Value Ref Range Glucose (POC) 244 (H) 70 - 110 mg/dL Additional Data Reviewed:   
 
Signed By: Yady Gutierrez MD   
 March 19, 2020 1:14 PM

## 2020-03-19 NOTE — PROGRESS NOTES
Cardiology progress note CARDIOLOGY PROGRESS NOTE 
RECS: 
 
 
 
1. Paroxymal Atrial flutter/atrial fibrillation  with RVR- maintaining NSR- continue  amiodarone po. On Eliquis 5 mg bid for stroke prevention 2. Anemia- H&H low but stable 3. Hypertension- stable continue  Norvasc. 4. Acute respiratory Failure- intubated. Extubated 3/7/20 - on O2 by Nasal canula 5. Acute Sigmoid Colon Perforation - s/p Emergent Ex-Lap with drainage of intraabdominal stool and collections; sigmoid colectomy with Dr. Raymond Woodard on 02/29/20.sigmoid perforation, secondary peritonitis  S/p Re-exploratory laparotomy, drainage of intra-abdominal stool collection, descending colectomy with mobilization of the splenic flexure, and transverse colostomy on 3/1. 
6. Septic Shock-improved  on antibiotics managed by [facility] and ID  
7. Acute on chronic diastolic heart failure-  compensated now SOB and BLE improved. 8. Hx sarcoidosis  
9. Pulmonary hypertension with PAP 80 mmHg on recent echo. Improved to 57 mm on repeat echo on 3/17/2020. 10. Abnormal TSH- medications per [facility] care 11. Hypokalemia-replace as needed. Stable from cardiac stand  Point discharge plans per medical team. 
Will need PFT/TSH in the next 2 to 3 weeks as patient is on amiodarone. Continue salt restriction and weight reduction. Echo 3/17/20 Result status: Final result · Normal cavity size and systolic function (ejection fraction normal). Estimated left ventricular ejection fraction is 55 - 60%. Visually measured ejection fraction. No regional wall motion abnormality noted. · Mild tricuspid valve regurgitation is present. · Moderate pulmonary hypertension. Pulmonary arterial systolic pressure is 57 mmHg. · Right Ventricle: Normal cavity size and global systolic function. Normal wall motion.  
 
 
 
 
       
02/29/20 ECHO ADULT FOLLOW-UP OR LIMITED 03/03/2020 3/3/2020   Narrative · Dilated right ventricle. Reduced systolic function. Abnormal right  
ventricular septal motion. Systolic flattening of interventricular septum  
consistent with right ventricle pressure overload. · Moderate tricuspid valve regurgitation is present. · Severely elevated central venous pressure (15+ mmHg); IVC diameter is  
larger than 21 mm and collapses less than 50% with respiration. · Severe pulmonary hypertension. · Pulmonary arterial systolic pressure is 89.3 mmHg · Normal cavity size, wall thickness and systolic function (ejection  
fraction normal). Estimated left ventricular ejection fraction is 55 -  
60%. · Dilated right atrium. 
   
    Signed by: Stanley Cavazos MD  
 
 
 
ASSESSMENT: 
Hospital Problems  Date Reviewed: 2/28/2020 Codes Class Noted POA Perforated viscus ICD-10-CM: R19.8 ICD-9-CM: 799.89  2/29/2020 Unknown Septic shock (HCC) ICD-10-CM: A41.9, R65.21 ICD-9-CM: 038.9, 785.52, 995.92  2/29/2020 Unknown SUBJECTIVE: 
No c/o dyspnea. No chest pain feels better C/o leg cramping OBJECTIVE: 
 
VS:  
Visit Vitals /81 (BP 1 Location: Left arm, BP Patient Position: At rest) Pulse 84 Temp 98.2 °F (36.8 °C) Resp 16 Ht 5' 4\" (1.626 m) Wt 74.9 kg (165 lb 3.2 oz) SpO2 98% BMI 28.36 kg/m² Intake/Output Summary (Last 24 hours) at 3/19/2020 1939 Last data filed at 3/19/2020 8964 Gross per 24 hour Intake 820 ml Output 1500 ml Net -680 ml  
 
TELE: normal sinus rhythm General: No acute distress. HENT: Normocephalic, atraumatic. Neck :  Supple Cardiac:  Normal S1/S2 Chest/Lungs: Reduced at bases but no significant rales or rhonchi audible Abdomen: Soft, distended non tender Extremities: BLE edema resolved. Labs: Results:  
   
Chemistry Recent Labs  
  03/19/20 
0242 03/18/20 
0540 03/17/20 
0145 GLU 73* 139* 123*  136 139  
K 4.0 3.3* 3.7  100 101 CO2 25 30 34* BUN 7 7 5*  
 CREA 0.54* 0.58* 0.49* CA 8.3* 8.2* 8.3*  
MG 1.6 1.4* 1.7 PHOS 2.7 3.4 2.6 AGAP 6 6 4 BUCR 13 12 10* CBC w/Diff Recent Labs  
  03/19/20 
0242 03/18/20 
0540 03/17/20 
0145 WBC 7.0 6.6 7.9  
RBC 3.66* 3.66* 3.76* HGB 9.7* 9.6* 10.0* HCT 30.5* 30.3* 31.6*  
 345 403 GRANS 53 55 74 LYMPH 40 31 10* EOS 1 0 0 Cardiac Enzymes No results for input(s): CPK, CKND1, SANTOS in the last 72 hours. No lab exists for component: Fatou Mar Coagulation No results for input(s): PTP, INR, APTT, INREXT, INREXT in the last 72 hours. Lipid Panel Lab Results Component Value Date/Time Cholesterol, total 141 09/30/2019 12:00 AM  
 HDL Cholesterol 39 (L) 09/30/2019 12:00 AM  
 LDL, calculated 61 09/30/2019 12:00 AM  
 VLDL, calculated 41 (H) 09/30/2019 12:00 AM  
 Triglyceride 204 (H) 09/30/2019 12:00 AM  
 CHOL/HDL Ratio 3.5 11/06/2016 04:18 AM  
  
BNP No results for input(s): BNPP in the last 72 hours. Liver Enzymes No results for input(s): TP, ALB, TBIL, AP, SGOT, GPT in the last 72 hours. No lab exists for component: DBIL Digoxin Thyroid Studies Lab Results Component Value Date/Time TSH 5.11 (H) 03/05/2020 06:25 PM  
    
 
 
 
SOREN Simmons supervised I have independently evaluated and examined the patient. All relevant labs and testing data's are reviewed. Care plan discussed and updated after review.  
 
Dimas Thornton MD

## 2020-03-19 NOTE — ROUTINE PROCESS
Bedside and Verbal shift change report given to Lauren Jones (oncoming nurse) by Lizette Collet (offgoing nurse). Report included the following information SBAR, Kardex, Recent Results and Cardiac Rhythm NSR.

## 2020-03-19 NOTE — PROGRESS NOTES
[facility] Pulmonary, Critical Care, and Sleep Medicine Name: Abby Pleitez MRN: [id number] : 1955 Hospital: [address] [place] [address] Date: 3/19/2020 IMPRESSION:  
· Acute on Chronic Respiratory Failure - Emergently intubated in ED for airway protection-liberated from ventilator and extubated 3/8/2020 . chronically on 3L NC at home. · Acute Sigmoid Colon Perforation w/ peritonitis - s/p emergent ex-lap w/ drainage of intraabdominal stool and collections; sigmoid colectomy with Dr. Aissatou Mcginnis on 20. Subsequent washout and abdominal closure on 20.  · Staph bacteremia - Grew on BCx collected 3/3. GNR bacteremia on , no regrowth · Bilateral PNA - Sputum Cx collected 3/1 grew Moraxella catarrhalis. · Paraparesis, in wheelchair: Recent Hx of Spine Thoracic Laminectomy T6-T11 with Fusion (20) with Dr. Melita Nyhan 2/2 Acute compression fx of T9-T10 and spinal cord compression at T9 and T10 and spinal cord edema with subsequent paraparesis. · Hx of Sarcoidosis - Chronic steroid use. · Hx of COPD, on chronic 3L NC, FEV1 51% in , unable to perform repeat studies since  - Follows with Dr. Hany Torres in clinic. · Hx of Cocaine and Heroin Abuse - on methadone. Patient Active Problem List  
Diagnosis Code  Diabetic neuropathy associated with type 2 diabetes mellitus (HCC) E11.40  Venous insufficiency I87.2  Obesity, Class I, BMI 30-34.9 E66.9  Chronic back pain M54.9, G89.29  
 Generalized osteoarthritis of multiple sites M15.9  Bipolar affective disorder ([facility] F31.9  Abnormally low high density lipoprotein (HDL) cholesterol with hypertriglyceridemia E78.6, E78.1  Diastolic dysfunction without heart failure I51.89  Chronic obstructive pulmonary disease (COPD) ([facility] J44.9  Hypertensive heart disease without heart failure I11.9  Depression F32.9  History of back injury Z87.828  
  Type 2 diabetes mellitus with diabetic neuropathy, with long-term current use of insulin ([facility]) E11.40, Z79.4  Anxiety F41.9  Wears glasses Z97.3  History of hepatitis C Z86.19  
 Sickle cell trait ([facility]) D57.3  History of acute renal failure Z87.448  Hypothyroidism E03.9  Recurrent genital herpes A60.00  Sarcoidosis D86.9  Abscess of right arm L02.413  Hypoxemia requiring supplemental oxygen R09.02, Z99.81  
 Abnormal nuclear stress test R94.39  
 Cellulitis and abscess of hand L03.119, L02.519  
 Cellulitis and abscess of foot L03.119, L02.619  
 Cellulitis of arm, right L03. 113  
 Olecranon bursitis of right elbow M70.21  
 Contusion of left elbow S50. 02XA  Intravenous drug user F19.90  Right Achilles tendinitis M76.61  
 History of penicillin allergy Z88.0  Falls frequently R29.6  Gastroesophageal reflux disease with hiatal hernia K21.9, K44.9  Memory difficulty R41.3  Mixed connective tissue disease ([facility] [address] M35.1  Impaired mobility and ADLs Z74.09  
 Hyperuricemia E79.0  History of vitamin D deficiency Z86.39  
 Urge urinary incontinence N39.41  Spinal cord compression ([facility]) G95.20  Compression fracture of body of thoracic vertebra ([facility]) S22.000A  Status post laminectomy with spinal fusion Z98.1  Acute blood loss as cause of postoperative anemia D62  
 History of fracture due to fall Z87.81  Chronic respiratory failure with hypoxia ([facility]) J96.11  
 Cellulitis of right forearm L03. 113  
 Gout M10.9  Menopause Z78.0  Long term current use of aspirin Z79.82  
 Opioid dependence ([facility]) F11.20  Tobacco use disorder F17.200  Sepsis ([facility] [address] A41.9  Perforated viscus R19.8  Septic shock ([facility]) A41.9, R65.21 PLAN:  
Pulm: continue O2 support. Continue duo nebs, maintenance prednisone dose 30 mg daily ID-continue Vanco and Zosyn for peritonitis, per ID Surgical-continue postop wound care, ostomy care,  
 Nutrition-advance as tolerated. Dietary service following Electrolytes-monitor and replace Aggressive PT/OT Subjective/Interval History:  
 
20 Resting comfortably in bed on oxygen. Denies any new complaints related to her breathing Some cough-nonproductive Denies chest pain Eating better ROS:Pertinent items are noted in HPI. Objective:  
Vital Signs:   
Visit Vitals /82 (BP 1 Location: Left arm, BP Patient Position: At rest) Pulse 87 Temp 99.1 °F (37.3 °C) Resp 16 Ht 5' 4\" (1.626 m) Wt 74.9 kg (165 lb 3.2 oz) LMP 2012 (Exact Date) SpO2 100% BMI 28.36 kg/m² O2 Device: Nasal cannula O2 Flow Rate (L/min): 3 l/min Temp (24hrs), Av.3 °F (36.8 °C), Min:97.6 °F (36.4 °C), Max:99.2 °F (37.3 °C) Intake/Output:  
Last shift:       07 - 1900 In: 240 [P.O.:240] Out: 0 Last 3 shifts: 1901 -  0700 In: 36 [P.O.:720; I.V.:100] Out: 1500 [Urine:1500] Intake/Output Summary (Last 24 hours) at 3/19/2020 1221 Last data filed at 3/19/2020 5937 Gross per 24 hour Intake 1060 ml Output 1500 ml Net -440 ml Physical Exam:  
 General: in no apparent distress and oriented times 3 HEENT: Normal 
 Neck: No abnormally enlarged lymph nodes. Chest: Poor excursion Lungs: decreased air exchange bilaterally Heart: S1S2 present Abdomen: abdomen is soft with ostomy Extremity: negative Neuro: alert Skin: Skin color, texture, turgor normal. No rashes or lesions DATA: 
Labs: 
Recent Labs  
  20 
0242 20 
0540 20 
0145 WBC 7.0 6.6 7.9 HGB 9.7* 9.6* 10.0* HCT 30.5* 30.3* 31.6*  
 345 403 Recent Labs  
  20 
0242 20 
0540 20 
0145  136 139  
K 4.0 3.3* 3.7  100 101 CO2 25 30 34* GLU 73* 139* 123* BUN 7 7 5* CREA 0.54* 0.58* 0.49* CA 8.3* 8.2* 8.3*  
MG 1.6 1.4* 1.7 PHOS 2.7 3.4 2.6 No results for input(s): PH, PCO2, PO2, HCO3, FIO2 in the last 72 hours. Imaging: 
[x]I have personally reviewed the patients radiographs XR Results (most recent): 
Results from [facility] Encounter encounter on 02/29/20 XR CHEST SNGL V  
 Narrative EXAM:  XR CHEST SNGL V 
 
INDICATION:   f/u respiratory failure COMPARISON: 3/8/2020 FINDINGS:  
 
NG tube removed. No new parenchymal opacities in either lung. Stable heart size. No pneumothorax or other acute changes. Impression IMPRESSION: 
 
Stable chest.  
 
High complexity decision making was performed during the evaluation of this patient at high risk for decompensation with multiple organ involvement 
  
Above mentioned total time spent on reviewing the case/medical record/data/notes/EMR/patient examination/documentation/coordinating care with nurse/consultants, exclusive of procedures with complex decision making performed and > 50% time spent in face to face evaluation.  
 
Roslyn George MD

## 2020-03-19 NOTE — ROUTINE PROCESS
TRANSFER - IN REPORT: 
 
Verbal report received from Michelet Keith (name) on [address]  being received from CVT-step down(unit) for routine progression of care Report consisted of patients Situation, Background, Assessment and  
Recommendations(SBAR). Information from the following report(s) SBAR, Kardex, [facility] and Cardiac Rhythm SR was reviewed with the receiving nurse. Opportunity for questions and clarification was provided. 2041Assessment completed upon patients arrival to unit and care assumed. Patient oriented to unit, positioned in bed, VS recorded. Mid-line drgs/ wound vac intact, no stool noted in colostomy.

## 2020-03-19 NOTE — DIABETES MGMT
GLYCEMIC CONTROL PLAN OF CARE Assessment/Recommendations: 
Lab glucose this am 73 mg/dl Continue basal and corrective insulin coverage as ordered. Noted pt receiving steroids daily May benefit from mealtime lispro 3 units tid with meals. Will continue inpatient monitoring. Most recent blood glucose values: 
Results for Tsering Ortega (MRN [id number]) as of 3/19/2020 12:13 Ref. Range 3/18/2020 11:06 3/18/2020 16:02 3/18/2020 21:23 3/19/2020 08:26 3/19/2020 11:46 GLUCOSE,FAST - POC Latest Ref Range: 70 - 110 mg/dL 184 (H) 283 (H) 302 (H) 142 (H) 244 (H) Current A1C of 8.2 % is equivalent to average blood glucose of 189_mg/dl over the past 2-3 months. Current hospital diabetes medications:  
Lantus 10 units daily Lispro corrective insulin coverage every 6 hours as needed Previous day's insulin requirements:  
Lantus 10 units Lispro 21 units corrective insulin Home diabetes medications:  Per pta med list 
Lantus 20 units daily before breakfast 
Humalog AC based on her blood sugar reading Diet:   
NPO. Dysphagia pureed. supplements Education:  ____Refer to Diabetes Education Record  
          _x__Education not indicated at this time Jose Castillo RN CDE Ext [phone]

## 2020-03-19 NOTE — PROGRESS NOTES
NUTRITION Nutrition Consult: General Nutrition Management & Supplements RECOMMENDATIONS / PLAN:  
 
- Change supplements to Glucerna Shake, TID. - Monitor and encourage po intake as tolerated. - Continue RD inpatient monitoring and evaluation. NUTRITION INTERVENTIONS & DIAGNOSIS:  
 
- Meals/snacks: modified composition - Medical food supplement therapy: Ensure Enlive TID- modify - Vitamin and mineral supplement therapy: multivitamin Nutrition Diagnosis: Inadequate oral intake related to decreased appetite, weakness, altered GI function as evidenced by pt consuming 50% or less of recent meals and requires assistance eating. ASSESSMENT:  
 
3/19: Fair intake, states not consuming supplements due to the \"running her sugar up\" BG levels  mg/dL, will change to glucerna shake. Diet consistency advanced per SLP, pt anticipates better intake. FMS removed and per surgery, expected to have some rectal stool after FMS removed. Electrolytes WNL today. 3/16: Pt refusing nystatin and liquid oral multivitamin- states they cause burning sensation but able to tolerate oral tablet medications and swallow without pain or difficulty. Decreased output from FMS, making good urine, reports fair appetite but meal intake remains poor and pt takes long time to eat; denies nausea/vomiting and c/o abdominal tightness and states mouth pain continues to improve. Encouraged po intake. 3/14: Pt has not consumed Ensure yet, but plans to, unable to assess tolerance. Reviewed labs, phos remains low, but is receiving oral replacement. K now wnl. Ionized Ca low, consider calcium supplementation as appropriate per MD. 
3/13: Patient reports appetite/meal intake improved some today, ate 50% of breakfast and mouth pain has improved, c/o burning with magic cup supplements but agreeable to trying different supplements and encouraged po intake. C/o continued abdominal pain, denies nausea/vomiting.  Pt states she does not want NGT placed. 3/12: Appetite and meal intake remain poor, c/o mouth and stomach pain, denies nausea/vomiting and ate only jello today. Does not like the Ensure pudding. Downgraded to full liquid diet by SLP this morning. Increased output from FMS- replaced overnight, also with increased output from wound VAC and minimal stool out colostomy. Recent BG 85, 88, 94 mg/dL and lantus held by nursing this morning.  
3/11: Patient with poor appetite and meal intake, c/o mouth pain from sores on tongue causing pain when eating and drinking but improved some today after treatment with lubrication and started on nystatin. No output from FMS but now increased stool from colostomy with continued hypokalemia. Likes supplements, able to consume 1/3 of magic cup at dinner yesterday per family at bedside. Hypoglycemia with tube feeding stopped and steroids decreased and lantus dose to decrease per discussion with MD.  
3/10: Tolerating bolus feeds, hypernatremia improving and replacement given for K of 2.5 today. Requires assistance eating, consuming 30-50% of recent meals and Surgery recommending NGT removal as soon as possible- will remove and discontinue supplemental tube feeding despite inadequate meal intake per discussion with MD. Loose stool per rectum and FMS placed overnight in addition to colostomy output and 3.4 L UOP total yesterday after diuresis- contributing to hypokalemia with low normal magnesium- to be replaced to assist with K repletion. 3/9: Extubated 3/7, NGT keep in place post extubation and tube feeding at goal. Diet started after swallow evaluation this morning, pt lethargic with anticipated poor meal intake- will continue feeds, change to bolus regimen and monitor adequacy of meal intake per MD.  
3/7: Tolerating feeds at 30 mL/hr, residual  mL. Receiving mout etoclopramide & miralax held this morning after large loose stool filled colostomy bag. 3/6: Feeds advanced to 20 mL/hr then rate dropped back down to 10 mL/hr after 200 mL residual. Pt with chronic constipation, hx of narcotic use, partial paralysis and prolonged ileus anticipated per MD. Lantus increased for hyperglycemia, hypernatremia improving. 3/5: Tolerating trickle feeds, hypernatremia noted. 3/4: Minimal out from colostomy with 1100 mL out from NGT- will start trial of trickle feeding and monitor. 3/3: Remains intubated, no nausea/vomiting, no ostomy function. No output documented from NGT.  
3/2: S/p ex lap, descending colectomy and creation of colostomy on 3/1 then re-ex lap, drainage of intra-abdominal stool collection, descending colectomy with mobilization of the splenic flexure and transverse colostomy. Remains intubated on the vent postop. Nutritional intake adequate to meet patients estimated nutritional needs:  No  
 
Diet: DIET NUTRITIONAL SUPPLEMENTS Breakfast, Lunch, Dinner; ENSURE ENLIVE 
DIET DENTAL SOFT (SOFT SOLID) Food Allergies: NKFA Current Appetite: Fair Appetite/meal intake prior to admission: Fair, consuming soft foods; good meal intake PTA per family report Feeding Limitations:  [x] Swallowing difficulty: SLP following    [] Chewing difficulty    [x] Other: weakness, mouth pain- improving Current Meal Intake:  
Patient Vitals for the past 100 hrs: 
 % Diet Eaten 03/18/20 1830 15 % 03/18/20 1306 98 % BM: 3/19, watery-colostomy- FMS removed Skin Integrity: abdominal surgical incision with wound VAC, friction injury to sacral  
Edema:   [] No     [x] Yes Pertinent Medications: Reviewed: SSI, steroid, 10 units lantus, pantoprazole, levothyroxine po, furosemide, spironolactone, nystatin- refusing Recent Labs  
  03/19/20 
0242 03/18/20 
0540 03/17/20 
0145  136 139  
K 4.0 3.3* 3.7  100 101 CO2 25 30 34* GLU 73* 139* 123* BUN 7 7 5* CREA 0.54* 0.58* 0.49* CA 8.3* 8.2* 8.3*  
MG 1.6 1.4* 1.7 PHOS 2.7 3.4 2.6 Intake/Output Summary (Last 24 hours) at 3/19/2020 1237 Last data filed at 3/19/2020 4579 Gross per 24 hour Intake 1060 ml Output 1500 ml Net -440 ml Anthropometrics: 
Ht Readings from Last 1 Encounters:  
03/17/20 5' 4\" (1.626 m) Last 3 Recorded Weights in this Encounter 03/17/20 0600 03/17/20 1413 03/19/20 6119 Weight: 82.6 kg (182 lb) 82.6 kg (182 lb) 74.9 kg (165 lb 3.2 oz) Body mass index is 28.36 kg/m². Weight History: previously reported intentional weight loss and previous usual weight of 230 lb; patient reports recent usual weight of 175 lb prior to admission Weight Metrics 3/19/2020 2/28/2020 1/22/2020 1/17/2020 1/2/2020 12/16/2019 12/14/2019 Weight 165 lb 3.2 oz - 171 lb 11.2 oz - 172 lb 6.4 oz - 178 lb 9.6 oz  
BMI 28.36 kg/m2 28.57 kg/m2 - 28.57 kg/m2 - 28.69 kg/m2 - Some encounter information is confidential and restricted. Go to Review Flowsheets activity to see all data. Admitting Diagnosis: Septic shock ([facility] [address] [A41.9, R65.21] Perforated viscus [R19.8] Pertinent PMHx: COPD, HTN, DM, hepatitis C, sarcoidosis, methadone use, GERD with hiatal hernia, hypothyroidism Procedures: Spine Thoracic Laminectomy T6-T11 with Fusion (12/11/20) 2/2 compression fx of T9-T10 and spinal cord compression at T9 and T10 and spinal cord edema with subsequent paraparesis Education Needs:        [x] None identified  [] Identified - Not appropriate at this time  []  Identified and addressed - refer to education log Learning Limitations:   [x] None identified  [] Identified:  
Cultural, Denominational & ethnic food preferences:  [x] None identified    [] Identified and addressed ESTIMATED NUTRITION NEEDS:  
 
Calories: 6613-1459 kcal (MSJx1.2-1.5) based on  [x] Actual BW 88 kg     [] IBW Protein:  gm (1-1.5 gm/kg) based on  [x] Actual BW      [] IBW Fluid: 1 mL/kcal 
  
MONITORING & EVALUATION:  
  
Nutrition Goal(s):  
 - PO nutrition intake will meet >75% of patient estimated nutritional needs within the next 7 days. Outcome: Progressing towards goal   
- Patient potassium levels will maintain WNL of 3.5-5.5 mmol/L within the next 5-7 days. Outcome: Met/Continue Monitoring:   [x] Food and nutrient intake   [x] Food and nutrient administration  [x] Comparative standards   [x] Nutrition-focused physical findings   [x] Anthropometric Measurements   [x] Treatment/therapy   [x] Biochemical data, medical tests, and procedures Previous Recommendations (for follow-up assessments only): Implemented Discharge Planning: Nutritional discharge needs unknown at this time. Participated in care planning, discharge planning, & interdisciplinary rounds as appropriate. Quiana Kline RD Pager: [phone]

## 2020-03-19 NOTE — PROGRESS NOTES
Per Adrian Peng at [facility], [employer] does NOT have a SNF benefit. Discussed with  Aileen HENSLEY. DARIA Rollins, [place]- [phone]

## 2020-03-19 NOTE — ROUTINE PROCESS
Bedside verbal report given to JANNET Espinoza RN, reviewed SBAR, MAR, VS and summary. Patient quietly resting on rounds.

## 2020-03-20 NOTE — DIABETES MGMT
GLYCEMIC CONTROL PLAN OF CARE Assessment/Recommendations: 
Glucose not done this am related to pt having eaten breakfast prior to BG check Pre lunch glucose 309 mg/dl 
lantus and corrective dose given. Noted pt receiving steroids daily May benefit from mealtime lispro 3 units tid with meals. Will continue inpatient monitoring. Most recent blood glucose values: 
Results for Sukhdev Goyal (MRN [id number]) as of 3/20/2020 13:59 Ref. Range 3/19/2020 08:26 3/19/2020 11:46 3/19/2020 15:23 3/20/2020 11:37 GLUCOSE,FAST - POC Latest Ref Range: 70 - 110 mg/dL 142 (H) 244 (H) 203 (H) 309 (H) Current A1C of 8.2 % is equivalent to average blood glucose of 189_mg/dl over the past 2-3 months. Current hospital diabetes medications:  
Lantus 10 units daily Lispro corrective insulin coverage every 6 hours as needed Previous day's insulin requirements:  
Lantus 10 units Lispro 18 units corrective insulin Home diabetes medications:  Per pta med list 
Lantus 20 units daily before breakfast 
Humalog AC based on her blood sugar reading Diet:   
NPO. Dysphagia pureed. supplements Education:  ____Refer to Diabetes Education Record  
          _x__Education not indicated at this time Rosie Valentin RN CDE Ext [phone]

## 2020-03-20 NOTE — PROGRESS NOTES
Patient with wound vac in room. Logged in to [email or web address] to start authorization for a home would vac. Not in website list.  3208 [facility] wound nurse with concern. Whitney Hickman stated she would work on it now. Will continue to follow and start authorization ASAP. 1320: Wound vac ordered and submitted for Authoization with [employer]. 1400: Facesheet, H&P, Op note and wound care note, and wound vac therapy orders faxed to [facility]. Awaiting authorization. Nicole Carrillo RN BSN Care Manager [phone]

## 2020-03-20 NOTE — PROGRESS NOTES
Received report on pt.from off going RN. Resting quietly in bed on rounds. Denies c/o pain or SOB at this time. No acute distress noted. Call bell at side. Will cont to monitor for any changes in status. 1530 wound vac dressing changed. wound beefy red and healing well. Few staples still intact. Pt carlin well. 1730 sitting up in bed eating. Denies distress. 1930 Bedside and Verbal shift change report given to Gin Soto (oncoming nurse) by Carrol Umana RN (offgoing nurse). Report given with Melinda MEIER and MAR.

## 2020-03-20 NOTE — WOUND CARE
Physical Exam  
Room 209: [employer] completed. Notified Aileen WARRENN, RN, [facility], [address]

## 2020-03-20 NOTE — ROUTINE PROCESS
Bedside and Verbal shift change report given to Zenia Arvizu and lEiud Christianson RNs (oncoming nurse) by Tana Guido (offgoing nurse). Report included the following information SBAR, Kardex, MAR, Recent Results and Cardiac Rhythm SR. Patient awake and quietly resting, call light in reach.

## 2020-03-20 NOTE — PROGRESS NOTES
Problem: Mobility Impaired (Adult and Pediatric) Goal: *Acute Goals and Plan of Care (Insert Text) Description: Physical Therapy Goals Initiated 3/16/2020 and to be accomplished within 7 day(s) 1. Patient will move from supine to sit and sit to supine  and roll side to side in bed with moderate assistance . 2.  Patient will transfer from bed to chair and chair to bed with maximal assistance using the least restrictive device (SLIDING BOARD) 3. Patient will increase sitting balance to fair for increased safety with transfers. 4.  Patient will perform WC mobility 50 feet minimal assistance. Prior Level of Function:  
Patient was moderate assistance  for all mobility including bed mobility and sliding board transfers per last PT notes in 1/2020. Pt is nonambulatory. Patient lives alone with 24/7 care with nursing/aides in a single story home 4 steps to enter B handrail assist. Pt is dependent with stairs. She states she has a manual WC that she propels. She also states she has a bedside commode. Outcome: Progressing Towards Goal 
 PHYSICAL THERAPY TREATMENT Patient: Heather Sifuentes (65 y.o. female) Date: 3/20/2020 Diagnosis: Septic shock ([facility] [address] [A41.9, R65.21] Perforated viscus [R19.8] <principal problem not specified> Procedure(s) (LRB): 
LAPAROTOMY EXPLORATORY, DESCENDING COLECTOMY, CREATION OF COLOSTOMY (N/A) 19 Days Post-Op Precautions: Fall, Skin ASSESSMENT: 
Patient is cleared by nursing for PT, and patient consents to therapy. Rehab tech assisted with mobility for increased safety. Supine to sit maximum assistance x2 (second person mostly for safety for balance). Sitting balance activities with pt losing balance at times to the right and posterior with rehab tech staying posterior for increased safety. Attempted scooting laterally along bed in preparation for sliding board transfers with pt requiring maximum assistance x2 at this time.  Sit to supine maximum assistance x2. Rolling activities moderate/maximum assistance with pt needing cues for UE and LE placement. Pt ended therapy supine slightly turned to the left with all needs met. Progression toward goals:   
[x]      Improving appropriately and progressing toward goals 
[]      Improving slowly and progressing toward goals 
[]      Not making progress toward goals and plan of care will be adjusted PLAN: 
Patient continues to benefit from skilled intervention to address the above impairments. Continue treatment per established plan of care. Discharge Recommendations:  Jonathan Caldwell Further Equipment Recommendations for Discharge:  N/A  
 
SUBJECTIVE:  
Patient stated my first grandchild will be born in July (then on the phone with her daughter and due date is in August).  OBJECTIVE DATA SUMMARY:  
Critical Behavior: 
Neurologic State: Alert, Eyes open spontaneously Orientation Level: Oriented X4 Cognition: Appropriate decision making, Follows commands Safety/Judgement: Fall prevention Functional Mobility Training: 
Bed Mobility: 
Rolling: Moderate assistance;Maximum assistance(cues for motor planning and placement of extermities) Supine to Sit: Maximum assistance;Assist x2(2nd person more for safety) Sit to Supine: Maximum assistance;Assist x2 Scooting: Maximum assistance;Assist x2 Transfers: 
 Attempted scooting laterally in bed for preparation for sliding board transfers but requires maximum assistance x2 and unable to lift bottom Balance: 
Sitting: Impaired Sitting - Static: Fair (occasional) Sitting - Dynamic: Fair (occasional); Poor (constant support) Therapeutic Exercises:  
Reviewed and performed ankle pumps to increase blood flow and circulation. Pain: No pain noted before, during, or at end of session. Activity Tolerance:  
fair Please refer to the flowsheet for vital signs taken during this treatment. After treatment:  
[] Patient left in no apparent distress sitting up in chair 
[x] Patient left in no apparent distress in bed 
[x] Call bell left within reach [x] Nursing notified Lita 
[x] Personal items in reach 
[] Caregiver present 
[] Bed/chair alarm activated 
[] SCDs applied COMMUNICATION/EDUCATION:  
[x]         Role of Physical Therapy in the acute care setting. [x]         Fall prevention education was provided and the patient/caregiver indicated understanding. [x]         Patient/family have participated as able in working toward goals and plan of care. [x]         Patient/family agree to work toward stated goals and plan of care. []         Patient understands intent and goals of therapy, but is neutral about his/her participation. []         Patient is unable to participate in stated goals/plan of care: ongoing with therapy staff. 
[]         Out of bed at least 3-5 times a day with nursing assistance. []         Other: 
 
   
Anupam Brian, PT, DPT Time Calculation: 42 mins

## 2020-03-20 NOTE — PROGRESS NOTES
[facility] Hospitalist Group Progress Note Patient: Santos Hdz Age: 62 y.o. : 1955 MR#: [id number] SSN: [id number] Date/Time: 3/20/2020 Subjective:  
 
Pt has no complaints. Assessment/Plan:  
62 y o female w/ multiple med conditions including COPD on 3L, sarcoidosis, hep C, spinal cord compression admitted to the ICU after presenting to the ED w/ cc of abd pain and after she was noted to have septic shock, sigmoid colon perf, peritonitis. 
 
-Sigmoid perforation, peritonitis s/p emergent ex-lap w/ drainage of intraabdominal stool and abscess, sigmoid colectomy on 20, and re ex lap, drainage of intra abdominal stool collection, descending colectomy w/ mobilization of splenic flexure and transverse colostomy on 3/1/20. pt left with open closure w/ MOHINDER drain. Surgery signed off on 3/9. PLAN: wound care w/ wound vac and colostomy care 
 
-Septic shock POA-resolved 
 
-E.coli UTI 
 
-Acute on chronic respiratory failure 
 
-Acute on chronic diastolic heart failure 
 
-P-afib on eliquis Dispo: discussed w/ CM, reached out to daughter, daughter not picking phone unable to LVM. Additional Notes:   
 
Case discussed with:  [x]Patient  []Family  []Nursing  [x]Case Management DVT Prophylaxis:  []Lovenox  []Hep SQ  []SCDs  []Coumadin   []On Heparin gtt Objective:  
VS:  
Visit Vitals /77 (BP 1 Location: Left arm, BP Patient Position: At rest) Pulse 81 Temp 98.2 °F (36.8 °C) Resp 18 Ht 5' 4\" (1.626 m) Wt 72.6 kg (160 lb 0.9 oz) LMP 2012 (Exact Date) SpO2 100% BMI 27.47 kg/m² Tmax/24hrs: Temp (24hrs), Av.3 °F (36.8 °C), Min:97.8 °F (36.6 °C), Max:98.5 °F (36.9 °C) Input/Output:  
 
Intake/Output Summary (Last 24 hours) at 3/20/2020 1339 Last data filed at 3/20/2020 0475 Gross per 24 hour Intake 320 ml Output 2200 ml Net -1880 ml General: alert, awake, in NAD Cardiovascular:  Rrr, no murmurs Pulmonary: ctab GI: soft, nt, nd  
Extremities: no edema Additional:   
 
Labs:   
Recent Results (from the past 24 hour(s)) GLUCOSE, POC Collection Time: 03/19/20  3:23 PM  
Result Value Ref Range Glucose (POC) 203 (H) 70 - 110 mg/dL MAGNESIUM Collection Time: 03/20/20  5:20 AM  
Result Value Ref Range Magnesium 1.5 (L) 1.6 - 2.6 mg/dL PHOSPHORUS Collection Time: 03/20/20  5:20 AM  
Result Value Ref Range Phosphorus 3.0 2.5 - 4.9 MG/DL  
GLUCOSE, POC Collection Time: 03/20/20 11:37 AM  
Result Value Ref Range Glucose (POC) 309 (H) 70 - 110 mg/dL Additional Data Reviewed:   
 
Signed By: Jomar Manzo MD   
 March 20, 2020 1:14 PM

## 2020-03-20 NOTE — ROUTINE PROCESS
Bedside and Verbal shift change report given to Poli (oncoming nurse) by Diaz Obrien (offgoing nurse). Report included the following information SBAR, Kardex, MAR, Recent Results and Cardiac Rhythm SR. Patient awake and alert.

## 2020-03-21 NOTE — ROUTINE PROCESS
Assumed patient care. Bedside shfit report received from Sobeida Castrejon. Patient in bed, awake, alert and oriented x3. Denies pain or discomforts at this time. Call light placed within reach. Bed in low position. 7:47 AM - Bedside shift change report given to Sobeida Castrejon (oncoming nurse) by Indira Anaya RN (offgoing nurse). Report given with SBAR, Kardex, Intake/Output, MAR and Recent Results.

## 2020-03-21 NOTE — PROGRESS NOTES
1920 
Bedside and Verbal shift change report given to Riri ALVARADO (oncoming nurse) by Sandeep Santana (offgoing nurse). Report included the following information SBAR, Kardex, MAR and Recent Results.

## 2020-03-21 NOTE — ROUTINE PROCESS
Bedside and Verbal shift change report given to RN (oncoming nurse) by Sabrina Hamilton (offgoing nurse). Report included the following information SBAR, Kardex, MAR, Recent Results and Cardiac Rhythm SR. Patient awake and alert.

## 2020-03-21 NOTE — PROGRESS NOTES
Discharge planning Wound  Vac approved. Notified Dr. Kvng Bland. Writer called Yelena Nguyen, daughter per MD request. Isaac Stewart answered questions and concerning regarding wound vac and colostomy. Daughter verbalized understanding. Writer spoke with Socorro ALVARADO with [facility]. Per Socorro, [facility] will not be able to start care until Tuesday, 3/ 24/2020. Notified MD. BRIANA MustafaN, RN Pager # [phone] Care Manager

## 2020-03-21 NOTE — ROUTINE PROCESS
Dr Agrawal Liveeddie notified of accucheck 415, repeated 413 insulin coverage given, 15units. No orders given.

## 2020-03-21 NOTE — PROGRESS NOTES
Problem: Mobility Impaired (Adult and Pediatric) Goal: *Acute Goals and Plan of Care (Insert Text) Description: Physical Therapy Goals Initiated 3/16/2020 and to be accomplished within 7 day(s) 1. Patient will move from supine to sit and sit to supine  and roll side to side in bed with moderate assistance . 2.  Patient will transfer from bed to chair and chair to bed with maximal assistance using the least restrictive device (SLIDING BOARD) 3. Patient will increase sitting balance to fair for increased safety with transfers. 4.  Patient will perform WC mobility 50 feet minimal assistance. Prior Level of Function:  
Patient was moderate assistance  for all mobility including bed mobility and sliding board transfers per last PT notes in 1/2020. Pt is nonambulatory. Patient lives alone with 24/7 care with nursing/aides in a single story home 4 steps to enter B handrail assist. Pt is dependent with stairs. She states she has a manual WC that she propels. She also states she has a bedside commode. Outcome: Progressing Towards Goal 
 PHYSICAL THERAPY TREATMENT Patient: Sandi Amos (65 y.o. female) Date: 3/21/2020 Diagnosis: Septic shock ([facility] [address] [A41.9, R65.21] Perforated viscus [R19.8] <principal problem not specified> Procedure(s) (LRB): 
LAPAROTOMY EXPLORATORY, DESCENDING COLECTOMY, CREATION OF COLOSTOMY (N/A) 20 Days Post-Op Precautions: Fall, Skin Chart, physical therapy assessment, plan of care and goals were reviewed. OBJECTIVE/ ASSESSMENT: 
Patient found supine in bed willing to work with PT. Patient seen with OT to maximize safety of patient and staff. Pt sat at EOB this tx to attempt eating some food. Pt required constant support form REGALADO, until it was found that she had been incontinent of bowel. Pt returned to supine and rolled side to side for tyrell-care. Pt's stool was watery and eb colored. Pt scooted toward HOB and left supine in bed with head elevated and needs in reach. Pt has a sacral wound that is now open, sacral bandage re-applied at end of tx. Progression toward goals: 
[x]      Improving appropriately and progressing toward goals 
[]      Improving slowly and progressing toward goals 
[]      Not making progress toward goals and plan of care will be adjusted PLAN: 
Patient continues to benefit from skilled intervention to address the above impairments. Continue treatment per established plan of care. Discharge Recommendations: Rehab Further Equipment Recommendations for Discharge:  rolling walker SUBJECTIVE:  
Patient stated I'm going home today.  OBJECTIVE DATA SUMMARY:  
Critical Behavior: 
Neurologic State: Alert Orientation Level: Oriented X4 Cognition: Follows commands Safety/Judgement: Fall prevention Functional Mobility Training: 
Bed Mobility: 
Rolling: Moderate assistance;Assist x2 Supine to Sit: Maximum assistance(w/HOB raised and SR) Sit to Supine: Moderate assistance(assist w/BLE) Scooting: Maximum assistance;Assist x2 Balance: 
Sitting: Impaired Sitting - Static: Fair (occasional) Sitting - Dynamic: Fair (occasional)(fair minus) Pain: 
Pain level pre-treatment: Not rated Pain level post-treatment: Not rated Activity Tolerance:  
Fair Please refer to the flowsheet for vital signs taken during this treatment. After treatment:  
[] Patient left in no apparent distress sitting up in chair 
[x] Patient left in no apparent distress in bed 
[x] Call bell left within reach [x] Nursing notified ([facility] in room to assist at times during tx) [] Caregiver present 
[] Bed alarm activated 
[] SCDs applied COMMUNICATION/EDUCATION:  
[x]         Role of Physical Therapy 
[]         Fall prevention 
[x]         Bed mobility []         Transfers 
[]         Ambulation / gait []         Assistive device management 
[]         Stairs [x]         Body mechanics [x]         Position change  
[]         Therapeutic exercise 
[]         Activity pacing / energy conservation 
[]         Other: 
   
Will Fink PTA Time Calculation: 34 mins

## 2020-03-21 NOTE — PROGRESS NOTES
Discharge planning Faxed wound vac order to [facility] and notified [employer]. Sent HH orders to [employer] [facility] via [employer]. BRIANA McneilN, RN Pager # [phone] Care Manager

## 2020-03-21 NOTE — PROGRESS NOTES
[facility] Hospitalist Group Progress Note Patient: Rosemary Lozano Age: 62 y.o. : 1955 MR#: [id number] SSN: [id number] Date/Time: 3/21/2020 Subjective:  
 
Pt c/o some abd pain, states she had a \"big\" breakfast. No reports of n/v Assessment/Plan:  
62 y o female w/ multiple med conditions including COPD on 3L, sarcoidosis, hep C, spinal cord compression admitted to the ICU after presenting to the ED w/ cc of abd pain and after she was noted to have septic shock, sigmoid colon perf, peritonitis. 
 
-Sigmoid perforation, peritonitis s/p emergent ex-lap w/ drainage of intraabdominal stool and abscess, sigmoid colectomy on 20, and re ex lap, drainage of intra abdominal stool collection, descending colectomy w/ mobilization of splenic flexure and transverse colostomy on 3/1/20. pt left with open closure w/ MOHINDER drain. Surgery signed off on 3/9. PLAN: wound care w/ wound vac and colostomy care 
 
-Septic shock POA-resolved 
 
-E.coli UTI 
 
-Acute on chronic respiratory failure-now stable, on 3LPM NC home 02 
 
-Acute on chronic diastolic heart failure-now stable, no evidence of decompensation. 
 
-P-afib on eliquis Dispo: discussed w/ CM, reached out to daughter, daughter updated about possibility of pt coming home after wound vac set up cor cont'd outpt use. CM in process of getting home wound vac set up. Daughter expressed concern that she may not have the home health care she needs due to ongoing pandemic. Have reassured daughter that plan is to have all of the care she will need set up prior to d/c. Additional Notes:   
 
Case discussed with:  [x]Patient  [x]Family  []Nursing  [x]Case Management DVT Prophylaxis:  []Lovenox  []Hep SQ  []SCDs  []Coumadin   []On Heparin gtt ON [facility] Objective:  
VS:  
Visit Vitals /78 (BP 1 Location: Left arm, BP Patient Position: At rest) Pulse (!) 101 Temp 98.4 °F (36.9 °C) Resp 16 Ht 5' 4\" (1.626 m) Wt 75.1 kg (165 lb 9.1 oz) LMP 2012 (Exact Date) SpO2 96% BMI 28.42 kg/m² Tmax/24hrs: Temp (24hrs), Av.2 °F (36.8 °C), Min:97.1 °F (36.2 °C), Max:98.5 °F (36.9 °C) Input/Output:  
 
Intake/Output Summary (Last 24 hours) at 3/21/2020 1522 Last data filed at 3/21/2020 9964 Gross per 24 hour Intake 660 ml Output 2000 ml Net -1340 ml General: alert, awake, in NAD Cardiovascular:  Rrr, no murmurs Pulmonary: ctab GI: soft, nt, nd  
Extremities: no edema Additional:   
 
Labs:   
Recent Results (from the past 24 hour(s)) GLUCOSE, POC Collection Time: 20  3:37 PM  
Result Value Ref Range Glucose (POC) 275 (H) 70 - 110 mg/dL GLUCOSE, POC Collection Time: 20 10:21 PM  
Result Value Ref Range Glucose (POC) 415 (HH) 70 - 110 mg/dL GLUCOSE, POC Collection Time: 20 10:23 PM  
Result Value Ref Range Glucose (POC) 413 (HH) 70 - 110 mg/dL MAGNESIUM Collection Time: 20  3:23 AM  
Result Value Ref Range Magnesium 1.9 1.6 - 2.6 mg/dL GLUCOSE, POC Collection Time: 20 12:02 PM  
Result Value Ref Range Glucose (POC) 228 (H) 70 - 110 mg/dL GLUCOSE, POC Collection Time: 20  3:16 PM  
Result Value Ref Range Glucose (POC) 299 (H) 70 - 110 mg/dL Additional Data Reviewed:   
 
Signed By: Rohan Zaldivar MD   
 2020 1:14 PM

## 2020-03-21 NOTE — PROGRESS NOTES
Problem: Self Care Deficits Care Plan (Adult) Goal: *Acute Goals and Plan of Care (Insert Text) Description: Occupational Therapy Goals Initiated 03/10/2020, re-evaluated on 3/17/2020 within 7 day(s). 1.  Patient will perform self-feeding with supervision/set-up. 2.  Patient will perform upper body dressing with minimal assistance/contact guard assist. 
3.  Patient will perform functional task for 3 minutes while seated on EOB with moderate assistance. 4.  Patient will perform grooming task with minimal assistance. 5.  Patient will participate in upper extremity therapeutic exercise/activities with minimal assistance/contact guard assist for 8 minutes to increase strength/endurance for ADLs. Prior Level of Function: Per patient she was independent with upper body dressing, grooming, and self-feeding and required assistance for bathing/dressing. She used a wheelchair for functional mobility and was able to self propel. Patient was admitted in December and an ARU candidate where she was moderate assistance for transfers, supervision for UB bathing/dressing, and modified independent for feeding in January. Patient was able to sit EOB, with F+ balance. Outcome: Progressing Towards Goal 
 OCCUPATIONAL THERAPY TREATMENT Patient: Sandi Amos (65 y.o. female) Date: 3/21/2020 Diagnosis: Septic shock ([facility] [address] [A41.9, R65.21] Perforated viscus [R19.8] <principal problem not specified> Procedure(s) (LRB): 
LAPAROTOMY EXPLORATORY, DESCENDING COLECTOMY, CREATION OF COLOSTOMY (N/A) 20 Days Post-Op Precautions: Fall, Skin Chart, occupational therapy assessment, plan of care, and goals were reviewed. ASSESSMENT: 
Pt co-treated w/PT to maximize safety w/EOB activity. Poor trunk control requires constant support w/dynamic sitting balance. Pt tolerates ~ 10 minutes w/self-feeding activity. Pt w/loose BM, requiring return for supine for incontinent care.  Pt requires Max Assist w/toileting ADL at bed level. Pt requires 2 person assist w/bed mobility, rolling L/R for tyrell-care. Pt performs simple ADL grooming tasks at bed level w/set-up. Pt left w/all needs within reach. Encouraged UE TherEx and weight shifting for pressure ulcer prevention. Progression toward goals: 
[]          Improving appropriately and progressing toward goals [x]          Improving slowly and progressing toward goals 
[]          Not making progress toward goals and plan of care will be adjusted PLAN: 
Patient continues to benefit from skilled intervention to address the above impairments. Continue treatment per established plan of care. Discharge Recommendations:  Rehab vs Home Health w/24 hr care Further Equipment Recommendations for Discharge:  N/A, pt has DME SUBJECTIVE:  
Patient stated I might be going home today.  OBJECTIVE DATA SUMMARY:  
Cognitive/Behavioral Status: 
Neurologic State: Alert Orientation Level: Oriented X4 Cognition: Follows commands Safety/Judgement: Fall prevention Functional Mobility and Transfers for ADLs: 
 Bed Mobility: 
Rolling: Moderate assistance;Assist x2 Supine to Sit: Maximum assistance(w/HOB raised and SR) Sit to Supine: Moderate assistance(assist w/BLE) Scooting: Maximum assistance;Assist x2 Balance: 
Sitting: Impaired Sitting - Static: Fair (occasional) Sitting - Dynamic: Fair (occasional)(fair minus) ADL Intervention: 
Feeding Food to Mouth: Stand-by assistance Drink to Mouth: Stand-by assistance Grooming Washing Hands: Set-up Upper Body Dressing Assistance Hospital Gown: Minimum  assistance Toileting Toileting Assistance: Maximum assistance Bowel Hygiene: Maximum assistance Clothing Management: Maximum assistance Pain: 
Pain level pre-treatment: 0/10 Pain level post-treatment: 0/10 Activity Tolerance:   
Fair Please refer to the flowsheet for vital signs taken during this treatment. After treatment: []  Patient left in no apparent distress sitting up in chair 
[x]  Patient left in no apparent distress in bed 
[x]  Call bell left within reach [x]  Nursing notified 
[]  Caregiver present 
[]  Bed alarm activated COMMUNICATION/EDUCATION:  
[] Role of Occupational Therapy in the acute care setting 
[] Home safety education was provided and the patient/caregiver indicated understanding. [] Patient/family have participated as able in working towards goals and plan of care. [x] Patient/family agree to work toward stated goals and plan of care. [] Patient understands intent and goals of therapy, but is neutral about his/her participation. [] Patient is unable to participate in goal setting and plan of care. Thank you for this referral. 
SABINE Walters Time Calculation: 34 mins

## 2020-03-21 NOTE — ROUTINE PROCESS
Bedside verbal report received from [facility], reviewed SBAR, VS, MAR and summary of care. Patient awake on rounds, call light in reach and bed alarm on.

## 2020-03-22 NOTE — DISCHARGE INSTRUCTIONS
Patient Education        Ostomy Care: Care Instructions  Your Care Instructions    When a part of your intestine doesn't work as it should, a doctor can do surgery to make an opening in your belly and bring a part of your intestine to the surface of your skin. This opening is called an ostomy. There are two types. A colostomy is an ostomy of the colon. An ileostomy is an ostomy of the small intestine. With an ostomy, waste no longer leaves your body from your anus. It leaves your body through the part of your intestine at the ostomy opening. This part of the intestine is called the stoma. There's no muscle around the stoma. So you can't control when waste or gas leaves your body. Now your waste automatically goes from the stoma into a plastic bag (pouch) around the stoma. This pouch will block the smell of the waste. It can't be seen when you are wearing clothes. You can learn to take care of your ostomy. Good care can make living with a stoma easier. It can help keep a good seal between the skin and the pouch. This can prevent your skin from getting irritated. Follow-up care is a key part of your treatment and safety. Be sure to make and go to all appointments, and call your doctor if you are having problems. It's also a good idea to know your test results and keep a list of the medicines you take. How can you care for yourself at home? · If the skin under your pouch is red, irritated, or itchy, you need to treat your skin. Follow these steps:  ? Gently remove the pouch. ? Clean the skin under the pouch with water. ? Dry the skin. ? Sprinkle ostomy powder on the skin. Then gently wipe off the extra powder. ? Reattach or replace the pouch. · If you continue to have skin irritation, talk to your ostomy nurse. · Follow all instructions from your ostomy nurse. · Empty and replace your ostomy pouch as often as your nurse recommends. · Be safe with medicines. Take your medicines exactly as prescribed.  Call your doctor if you think you are having a problem with your medicine. You will get more details on the specific medicines your doctor prescribes. When should you call for help? Call your doctor now or seek immediate medical care if:    · You are vomiting.     · You have new or worse belly pain.     · You have a fever.     · You cannot pass stools or gas.    Watch closely for changes in your health, and be sure to contact your doctor if:    · Your stoma turns pale or changes color.     · Your stoma swells or bleeds.     · You have little or no waste going into your pouch. Where can you learn more? Go to http://rick-kathya.info/  Enter H949 in the search box to learn more about \"Ostomy Care: Care Instructions. \"  Current as of: August 11, 2019Content Version: 12.4  © 5668-6589 Healthwise, Incorporated. Care instructions adapted under license by [employer] (which disclaims liability or warranty for this information). If you have questions about a medical condition or this instruction, always ask your healthcare professional. [place] [address] any warranty or liability for your use of this information.        Patient Education     DISCHARGE SUMMARY from Nurse    PATIENT INSTRUCTIONS:    After general anesthesia or intravenous sedation, for 24 hours or while taking prescription Narcotics:  · Limit your activities  · Do not drive and operate hazardous machinery  · Do not make important personal or business decisions  · Do  not drink alcoholic beverages    Report the following to your surgeon:  · Excessive pain, swelling, redness or odor of or around the surgical area  · Temperature over 100.5  · Nausea and vomiting lasting longer than 4 hours or if unable to take medications  · Any questions    What to do at Home:  Recommended activity: Activity as tolerated,     If you experience any of the following symptoms chest pain, shortness of, fever greater than 100.5 , please follow up with PCP or go to nearest emergency room. *  Please give a list of your current medications to your Primary Care Provider. *  Please update this list whenever your medications are discontinued, doses are      changed, or new medications (including over-the-counter products) are added. *  Please carry medication information at all times in case of emergency situations. These are general instructions for a healthy lifestyle:    No smoking/ No tobacco products/ Avoid exposure to second hand smoke  Surgeon General's Warning:  Quitting smoking now greatly reduces serious risk to your health. Obesity, smoking, and sedentary lifestyle greatly increases your risk for illness    A healthy diet, regular physical exercise & weight monitoring are important for maintaining a healthy lifestyle    You may be retaining fluid if you have a history of heart failure or if you experience any of the following symptoms:  Weight gain of 3 pounds or more overnight or 5 pounds in a week, increased swelling in our hands or feet or shortness of breath while lying flat in bed. Please call your doctor as soon as you notice any of these symptoms; do not wait until your next office visit. The discharge information has been reviewed with the patient. The patient verbalized understanding. Discharge medications reviewed with the patient and appropriate educational materials and side effects teaching were provided. ___________________________________________________________________________________________________________________________________     Vacuum-Assisted Closure for Wound Healing: Care Instructions  Your Care Instructions  When you have a wound that is hard to close your doctor may treat it with vacuum-assisted closure (VAC). VAC uses negative pressure (suction) to help bring the edges of your wound together. It also removes fluid and dead tissue from the wound area.   In VAC:  · A special piece of foam or cotton gauze fits over your wound. This covers and protects the wound. A clear bandage (film dressing) goes several inches beyond the foam or gauze dressing to create a seal for the vacuum. · A tube connects the foam to a small machine called the therapy unit. The therapy unit creates the suction. · The [facility] system may be carried around (portable) or may stay in one place (stationary). VAC does not hurt. You may feel a mild pulling on the wound when treatment first starts. How long you need VAC depends on the size and type of wound you have and how well the [facility] works. You will be limited in what you can do while the wound heals. You will use VAC 24 hours a day. Follow-up care is a key part of your treatment and safety. Be sure to make and go to all appointments, and call your doctor if you are having problems. It's also a good idea to know your test results and keep a list of the medicines you take. How can you care for yourself at home? · A home health care worker will come to your home a few times a week to change the bandage and check the machine. You may need it changed more often if there is a lot of drainage. · Your doctor will give you information on what you can and can't do. This depends on where your wound is located. Your activities may be limited during the time you're using VAC. · You will be able to take sponge baths. Don't shower or take baths unless your doctor says it is okay. · Take pain medicines exactly as directed. ? If the doctor gave you a prescription medicine for pain, take it as prescribed. ? If you are not taking a prescription pain medicine, ask your doctor if you can take an over-the-counter medicine. · If your doctor prescribed antibiotics, take them as directed. Do not stop taking them just because you feel better. You need to take the full course of antibiotics. When should you call for help? Call 911 anytime you think you may need emergency care.  For example, call if:    · You have a lot of bleeding or see a sudden change in the color or texture of the drainage.     · The wound splits open and organs under the skin can be seen (evisceration).    Call your doctor now or seek immediate medical care if:    · The wound starts bleeding.     · The bandage comes off. Cover the area with a sterile bandage until you can see your doctor or your home health care worker comes by.     · You have signs of infection, such as:  ? Increased pain, swelling, warmth, or redness around the wound. ? Red streaks leading from the wound. ? Pus draining from the wound. ? A fever.    Watch closely for changes in your health, and be sure to contact your doctor if:    · The noise the machine makes changes or gets very loud. This may mean the seal is broken or the machine is not producing enough suction. Where can you learn more? Go to http://rick-kathya.info/  Enter W700 in the search box to learn more about \"Vacuum-Assisted Closure for Wound Healing: Care Instructions. \"  Current as of: July 14, 2019Content Version: 12.4  © 7758-2394 Healthwise, Incorporated. Care instructions adapted under license by [employer] (which disclaims liability or warranty for this information). If you have questions about a medical condition or this instruction, always ask your healthcare professional. [address] any warranty or liability for your use of this information.

## 2020-03-22 NOTE — PROGRESS NOTES
[facility] Pulmonary, Critical Care, and Sleep Medicine Name: Nirmal Trevino MRN: [id number] : 1955 Hospital: [address] [place] [address] Date: 3/22/2020 IMPRESSION:  
· Acute on Chronic Respiratory Failure - Emergently intubated in ED for airway protection-liberated from ventilator and extubated 3/8/2020 . chronically on 3L NC at home. · Acute Sigmoid Colon Perforation w/ peritonitis - s/p emergent ex-lap w/ drainage of intraabdominal stool and collections; sigmoid colectomy with Dr. Yesi Guido on 20. Subsequent washout and abdominal closure on 20.  · Staph bacteremia - Grew on BCx collected 3/3. GNR bacteremia on , no regrowth · Bilateral PNA - Sputum Cx collected 3/1 grew Moraxella catarrhalis. · Paraparesis, in wheelchair: Recent Hx of Spine Thoracic Laminectomy T6-T11 with Fusion (20) with Dr. Solitario Branch 2/2 Acute compression fx of T9-T10 and spinal cord compression at T9 and T10 and spinal cord edema with subsequent paraparesis. · Hx of Sarcoidosis - Chronic steroid use. · Hx of COPD, on chronic 3L NC, FEV1 51% in , unable to perform repeat studies since  - Follows with Dr. Zion Galo in clinic. · Hx of Cocaine and Heroin Abuse - on methadone. Patient Active Problem List  
Diagnosis Code  Diabetic neuropathy associated with type 2 diabetes mellitus (HCC) E11.40  Venous insufficiency I87.2  Obesity, Class I, BMI 30-34.9 E66.9  Chronic back pain M54.9, G89.29  
 Generalized osteoarthritis of multiple sites M15.9  Bipolar affective disorder ([facility] F31.9  Abnormally low high density lipoprotein (HDL) cholesterol with hypertriglyceridemia E78.6, E78.1  Diastolic dysfunction without heart failure I51.89  Chronic obstructive pulmonary disease (COPD) ([facility] J44.9  Hypertensive heart disease without heart failure I11.9  Depression F32.9  History of back injury Z87.828  
  Type 2 diabetes mellitus with diabetic neuropathy, with long-term current use of insulin ([facility]) E11.40, Z79.4  Anxiety F41.9  Wears glasses Z97.3  History of hepatitis C Z86.19  
 Sickle cell trait ([facility]) D57.3  History of acute renal failure Z87.448  Hypothyroidism E03.9  Recurrent genital herpes A60.00  Sarcoidosis D86.9  Abscess of right arm L02.413  Hypoxemia requiring supplemental oxygen R09.02, Z99.81  
 Abnormal nuclear stress test R94.39  
 Cellulitis and abscess of hand L03.119, L02.519  
 Cellulitis and abscess of foot L03.119, L02.619  
 Cellulitis of arm, right L03. 113  
 Olecranon bursitis of right elbow M70.21  
 Contusion of left elbow S50. 02XA  Intravenous drug user F19.90  Right Achilles tendinitis M76.61  
 History of penicillin allergy Z88.0  Falls frequently R29.6  Gastroesophageal reflux disease with hiatal hernia K21.9, K44.9  Memory difficulty R41.3  Mixed connective tissue disease ([facility] M35.1  Impaired mobility and ADLs Z74.09  
 Hyperuricemia E79.0  History of vitamin D deficiency Z86.39  
 Urge urinary incontinence N39.41  Spinal cord compression ([facility]) G95.20  Compression fracture of body of thoracic vertebra ([facility]) S22.000A  Status post laminectomy with spinal fusion Z98.1  Acute blood loss as cause of postoperative anemia D62  
 History of fracture due to fall Z87.81  Chronic respiratory failure with hypoxia ([facility]) J96.11  
 Cellulitis of right forearm L03. 113  
 Gout M10.9  Menopause Z78.0  Long term current use of aspirin Z79.82  
 Opioid dependence ([facility]) F11.20  Tobacco use disorder F17.200  Sepsis ([facility] A41.9  Perforated viscus R19.8  Septic shock ([facility]) A41.9, R65.21 PLAN:  
Pulm: continue O2 support. Continue duo nebs, maintenance prednisone dose 30 mg daily ID-completed Vanco and Zosyn for peritonitis, per ID Surgical-continue postop wound care, ostomy care,  
 Nutrition-advance as tolerated. Dietary service following Electrolytes-monitor and replace Aggressive PT/OT Subjective/Interval History:  
 
20 Resting comfortably in bed on oxygen. Denies any new complaints related to her breathing ROS:Pertinent items are noted in HPI. Objective:  
Vital Signs:   
Visit Vitals /78 (BP 1 Location: Left arm, BP Patient Position: At rest) Pulse 82 Temp 97.6 °F (36.4 °C) Resp 18 Ht 5' 4\" (1.626 m) Wt 74.8 kg (165 lb) LMP 2012 (Exact Date) SpO2 100% BMI 28.32 kg/m² O2 Device: Nasal cannula O2 Flow Rate (L/min): 2 l/min Temp (24hrs), Av.2 °F (36.8 °C), Min:97.6 °F (36.4 °C), Max:98.9 °F (37.2 °C) Intake/Output:  
Last shift:      No intake/output data recorded. Last 3 shifts:  1901 -  0700 In: 1200 [P.O.:1200] Out: 2350 [Urine:2350] Intake/Output Summary (Last 24 hours) at 3/22/2020 5533 Last data filed at 3/22/2020 2543 Gross per 24 hour Intake 960 ml Output 850 ml Net 110 ml Physical Exam:  
 General: in no apparent distress and oriented times 3 HEENT: Normal 
 Neck: No abnormally enlarged lymph nodes. Chest: Poor excursion Lungs: decreased air exchange bilaterally Heart: S1S2 present Abdomen: abdomen is soft with ostomy Extremity: negative Neuro: alert Skin: Skin color, texture, turgor normal. No rashes or lesions DATA: 
Labs: 
No results for input(s): WBC, HGB, HCT, PLT, HGBEXT, HCTEXT, PLTEXT, HGBEXT, HCTEXT, PLTEXT in the last 72 hours. Recent Labs  
  20 
0323 20 
0520 MG 1.9 1.5* PHOS  --  3.0 No results for input(s): PH, PCO2, PO2, HCO3, FIO2 in the last 72 hours. Imaging: 
[x]I have personally reviewed the patients radiographs XR Results (most recent): 
Results from [facility] Encounter encounter on 20 XR CHEST SNGL V  
 Narrative EXAM:  XR CHEST SNGL V 
 
INDICATION:   f/u respiratory failure COMPARISON: 3/8/2020 FINDINGS:  
 
NG tube removed. No new parenchymal opacities in either lung. Stable heart size. No pneumothorax or other acute changes. Impression IMPRESSION: 
 
Stable chest.  
 
High complexity decision making was performed during the evaluation of this patient at high risk for decompensation with multiple organ involvement 
  
Above mentioned total time spent on reviewing the case/medical record/data/notes/EMR/patient examination/documentation/coordinating care with nurse/consultants, exclusive of procedures with complex decision making performed and > 50% time spent in face to face evaluation.  
 
Tamara Phillips MD

## 2020-03-22 NOTE — PROGRESS NOTES
Discharge plann Discharge order noted for today. Pt has been accepted to [facility] agency. Met with patient and  agreeable to the transition plan today. Transport has been arranged through [place] [phone], confirmation # [id number]) Patient's discharge summary and home health  orders have been forwarded to [address] [place] [address] via [employer]. Updated bedside RN, Narayan Raman, to the transition plan. Discharge information has been documented on the AVS. Benigno Schaumann, BSN, RN Pager # [phone] Care Manager

## 2020-03-22 NOTE — PROGRESS NOTES
Problem: Diabetes Self-Management Goal: *Disease process and treatment process Description: Define diabetes and identify own type of diabetes; list 3 options for treating diabetes. Outcome: Progressing Towards Goal 
Goal: *Incorporating nutritional management into lifestyle Description: Describe effect of type, amount and timing of food on blood glucose; list 3 methods for planning meals. Outcome: Progressing Towards Goal 
Goal: *Incorporating physical activity into lifestyle Description: State effect of exercise on blood glucose levels. Outcome: Progressing Towards Goal 
Goal: *Developing strategies to promote health/change behavior Description: Define the ABC's of diabetes; identify appropriate screenings, schedule and personal plan for screenings. Outcome: Progressing Towards Goal 
Goal: *Using medications safely Description: State effect of diabetes medications on diabetes; name diabetes medication taking, action and side effects. Outcome: Progressing Towards Goal 
Goal: *Monitoring blood glucose, interpreting and using results Description: Identify recommended blood glucose targets  and personal targets. Outcome: Progressing Towards Goal 
Goal: *Prevention, detection, treatment of acute complications Description: List symptoms of hyper- and hypoglycemia; describe how to treat low blood sugar and actions for lowering  high blood glucose level. Outcome: Progressing Towards Goal 
Goal: *Prevention, detection and treatment of chronic complications Description: Define the natural course of diabetes and describe the relationship of blood glucose levels to long term complications of diabetes. Outcome: Progressing Towards Goal 
Goal: *Developing strategies to address psychosocial issues Description: Describe feelings about living with diabetes; identify support needed and support network Outcome: Progressing Towards Goal 
Goal: *Insulin pump training Outcome: Progressing Towards Goal 
 Goal: *Sick day guidelines Outcome: Progressing Towards Goal 
Goal: *Patient Specific Goal (EDIT GOAL, INSERT TEXT) Outcome: Progressing Towards Goal 
  
Problem: Patient Education: Go to Patient Education Activity Goal: Patient/Family Education Outcome: Progressing Towards Goal 
  
Problem: Falls - Risk of 
Goal: *Absence of Falls Description: Document Lima Ellis Fall Risk and appropriate interventions in the flowsheet. Outcome: Progressing Towards Goal 
Note: Fall Risk Interventions: 
Mobility Interventions: Assess mobility with egress test, Bed/chair exit alarm, Communicate number of staff needed for ambulation/transfer, OT consult for ADLs, Patient to call before getting OOB, PT Consult for mobility concerns, PT Consult for assist device competence, Strengthening exercises (ROM-active/passive) Mentation Interventions: Adequate sleep, hydration, pain control, Bed/chair exit alarm, Door open when patient unattended, Evaluate medications/consider consulting pharmacy, Toileting rounds Medication Interventions: Bed/chair exit alarm, Evaluate medications/consider consulting pharmacy Elimination Interventions: Bed/chair exit alarm, Call light in reach History of Falls Interventions: Bed/chair exit alarm, Door open when patient unattended, Evaluate medications/consider consulting pharmacy Problem: Patient Education: Go to Patient Education Activity Goal: Patient/Family Education Outcome: Progressing Towards Goal 
  
Problem: Pressure Injury - Risk of 
Goal: *Prevention of pressure injury Description: Document Florian Scale and appropriate interventions in the flowsheet.  
Outcome: Progressing Towards Goal 
Note: Pressure Injury Interventions: 
Sensory Interventions: Assess changes in LOC, Avoid rigorous massage over bony prominences, Discuss PT/OT consult with provider, Keep linens dry and wrinkle-free, Maintain/enhance activity level, Minimize linen layers, Monitor skin under medical devices, Pressure redistribution bed/mattress (bed type), Turn and reposition approx. every two hours (pillows and wedges if needed), Use 30-degree side-lying position, Sit a 90-degree angle/use footstool if needed Moisture Interventions: Absorbent underpads, Apply protective barrier, creams and emollients, Check for incontinence Q2 hours and as needed, Internal/External urinary devices, Maintain skin hydration (lotion/cream), Minimize layers, Moisture barrier Activity Interventions: Pressure redistribution bed/mattress(bed type), PT/OT evaluation Mobility Interventions: HOB 30 degrees or less, Pressure redistribution bed/mattress (bed type), Turn and reposition approx. every two hours(pillow and wedges), PT/OT evaluation Nutrition Interventions: Document food/fluid/supplement intake Friction and Shear Interventions: Apply protective barrier, creams and emollients, Foam dressings/transparent film/skin sealants, HOB 30 degrees or less, Lift team/patient mobility team, Minimize layers, Transferring/repositioning devices Problem: Patient Education: Go to Patient Education Activity Goal: Patient/Family Education Outcome: Progressing Towards Goal 
  
Problem: Diabetes Maintenance:Admission Goal: *Blood glucose 80 to 180 mg/dl Outcome: Progressing Towards Goal 
  
Problem: Nutrition Deficit Goal: *Optimize nutritional status Outcome: Progressing Towards Goal 
  
Problem: Surgical Wound Care Goal: *Non-infected Wound: Absence of infection signs and symptoms Description: Infection control procedures (eg: clean dressings, clean gloves, hand washing, precautions to isolate wound from contamination, sterile instruments used for wound debridement) should be implemented. Outcome: Progressing Towards Goal 
Goal: *Infected Wound: Prevention of further infection and promotion of healing Description: Consider the use of systemic antibiotics in patients with cellulitis, osteomyelitis, bacteremia, or sepsis if there are no contraindications. Outcome: Progressing Towards Goal 
Goal: *Improvement of existing wound and maintenance of skin integrity Outcome: Progressing Towards Goal 
  
Problem: Patient Education: Go to Patient Education Activity Goal: Patient/Family Education Outcome: Progressing Towards Goal 
  
Problem: Injury - Risk of, Adverse Drug Event Goal: *Absence of adverse drug events Outcome: Progressing Towards Goal 
Goal: *Absence of medication errors Outcome: Progressing Towards Goal 
Goal: *Knowledge of prescribed medications Outcome: Progressing Towards Goal 
  
Problem: Patient Education: Go to Patient Education Activity Goal: Patient/Family Education Outcome: Progressing Towards Goal 
  
Problem: Patient Education: Go to Patient Education Activity Goal: Patient/Family Education Outcome: Progressing Towards Goal 
  
Problem: Patient Education: Go to Patient Education Activity Goal: Patient/Family Education Outcome: Progressing Towards Goal 
  
Problem: Patient Education: Go to Patient Education Activity Goal: Patient/Family Education Outcome: Progressing Towards Goal

## 2020-03-22 NOTE — PROGRESS NOTES
Discharge planning Wound vac delivered to patient's room. Faxed([phone]) signed copy to [address] [facility] Notified [facility], RN. Patient was active with [facility]. Referral sent to [facility] and spoke with Maryan. Start of care will be on Tuesday, 3/24/2020. 
 
FRANK Moran, RN Pager # [phone] Care Manager

## 2020-03-22 NOTE — DISCHARGE SUMMARY
[facility] Patient: Rufino Abdi Age: 62 y.o. : 1955 MR#: [id number] SSN: [id number] PCP on record: Steffen Nino NP Admit date: 2020 Discharge date: 3/22/2020 Consults:  -JOAN Blankenship,-general surgery - JOSE Martinez,-ID 
-JOSSY Sesay,-cardiology Procedures:20 : 
PROCEDURES PERFORMED: 
1. Exploratory laparotomy. 2.  Drainage of multiple collection of stool and abscess collections. 3.  Sigmoid colectomy. 4.  Open abdomen. 
 
-3/1/20: 
PROCEDURES PERFORMED:  Re-exploratory laparotomy, drainage of intra-abdominal stool collection, descending colectomy with mobilization of the splenic flexure, and transverse colostomy. 3/11/20: IMPRESSION Impression: Successful placement of ultrasound-guided midline catheter. Significant Diagnostic Studies: -CT chest abd pelv 20: 
Impression:  
  
Moderate quantity of intraperitoneal free air. Findings are consistent with 
perforated viscus. The site of perforation is uncertain, as discussed above, 
possibly the sigmoid colon where there is apparent small subtle focal 
hypoattenuation along the wall as detailed above (reference axial images 111-113) close to region where several foci of free air are grouped. The left 
hemicolon is mildly distended and filled with hyperdense appearing stool. 
  
Mild diffuse mural thickening and enhancement of the small bowel as described. 
  
Small volume of ascites best seen around the liver and in the pelvis adjacent to 
the sigmoid. 
  
Bibasilar consolidations, right greater than left, suspicious for airspace 
disease/pneumonia. 
  
Indeterminate attenuation bilateral renal masses, follow-up evaluation 
recommended. 
  
Splenic hypodensities again seen as described on recent chest CT. 
  
Intubated. Severe pulmonary emphysematous disease and fibrotic changes. 
  
 Multiple otherwise nonacute findings as detailed above. -CXR 3/12/20: 
IMPRESSION IMPRESSION: 
  
Stable chest.   
 
-Cardiac echo 3/3/20: 
Left Ventricle Normal cavity size, wall thickness and systolic function (ejection fraction normal). The estimated ejection fraction is 55 - 60%. Wall Scoring The left ventricular wall motion is normal.  
  
  
  
Right Ventricle Dilated right ventricle. Reduced systolic function. Abnormal septal motion. Systolic flattening of interventricular septum consistent with right ventricle pressure overload. Right Atrium Dilated right atrium. 19 cm2 Tricuspid Valve Moderate regurgitation. Pulmonary arterial systolic pressure is 88.5 mmHg. Severe pulmonary hypertension. IVC/Hepatic Veins Dilated inferior vena cava. IVC diameter is 27 mm. Severely elevated central venous pressure (15+ mmHg); IVC diameter is larger than 21 mm and collapses less than 50% with respiration. Pericardium No evidence of pericardial effusion.  
 
-Repeat cardiac echo 3/17/20: 
Result status: Final result · Normal cavity size and systolic function (ejection fraction normal). Estimated left ventricular ejection fraction is 55 - 60%. Visually measured ejection fraction. No regional wall motion abnormality noted. · Mild tricuspid valve regurgitation is present. · Moderate pulmonary hypertension. Pulmonary arterial systolic pressure is 57 mmHg. · Right Ventricle: Normal cavity size and global systolic function. Normal wall motion. Discharge Diagnoses: -Sigmoid perforation 
-Septic shock POA 
-E.coli UTI 
-Acute on chronic respiratory failure 
-Acute on chronic diastolic heart failure Patient Active Problem List  
Diagnosis Code  Diabetic neuropathy associated with type 2 diabetes mellitus (HCC) E11.40  Venous insufficiency I87.2  Obesity, Class I, BMI 30-34.9 E66.9  Chronic back pain M54.9, G89.29  
 Generalized osteoarthritis of multiple sites M15.9  Bipolar affective disorder ([address] F31.9  Abnormally low high density lipoprotein (HDL) cholesterol with hypertriglyceridemia E78.6, E78.1  Diastolic dysfunction without heart failure I51.89  Chronic obstructive pulmonary disease (COPD) ([facility] J44.9  Hypertensive heart disease without heart failure I11.9  Depression F32.9  History of back injury Z87.828  Type 2 diabetes mellitus with diabetic neuropathy, with long-term current use of insulin ([facility]) E11.40, Z79.4  Anxiety F41.9  Wears glasses Z97.3  History of hepatitis C Z86.19  
 Sickle cell trait ([facility]) D57.3  History of acute renal failure Z87.448  Hypothyroidism E03.9  Recurrent genital herpes A60.00  Sarcoidosis D86.9  Abscess of right arm L02.413  Hypoxemia requiring supplemental oxygen R09.02, Z99.81  
 Abnormal nuclear stress test R94.39  
 Cellulitis and abscess of hand L03.119, L02.519  
 Cellulitis and abscess of foot L03.119, L02.619  
 Cellulitis of arm, right L03. 113  
 Olecranon bursitis of right elbow M70.21  
 Contusion of left elbow S50. 02XA  Intravenous drug user F19.90  Right Achilles tendinitis M76.61  
 History of penicillin allergy Z88.0  Falls frequently R29.6  Gastroesophageal reflux disease with hiatal hernia K21.9, K44.9  Memory difficulty R41.3  Mixed connective tissue disease [facility] M35.1  Impaired mobility and ADLs Z74.09  
 Hyperuricemia E79.0  History of vitamin D deficiency Z86.39  
 Urge urinary incontinence N39.41  Spinal cord compression ([facility]) G95.20  Compression fracture of body of thoracic vertebra ([facility]) S22.000A  Status post laminectomy with spinal fusion Z98.1  Acute blood loss as cause of postoperative anemia D62  
 History of fracture due to fall Z87.81  Chronic respiratory failure with hypoxia ([facility]) J96.11  
 Cellulitis of right forearm L03. 113  
 Gout M10.9  Menopause Z78.0  Long term current use of aspirin Z79.82  
 Opioid dependence ([facility]) F11.20  Tobacco use disorder F17.200  Sepsis ([address] A41.9  Perforated viscus R19.8  Septic shock (HCC) A41.9, R65.21 Hospital Course by Problem 62 y o female w/ multiple med conditions including COPD on 3L, sarcoidosis, hep C, spinal cord compression admitted to the ICU after presenting to the ED w/ cc of abd pain and after she was noted to have septic shock, sigmoid colon perf, peritonitis. 
  
-Sigmoid perforation, peritonitis s/p emergent ex-lap w/ drainage of intraabdominal stool and abscess, sigmoid colectomy on 2/29/20 and re ex lap, drainage of intra abdominal stool collection, descending colectomy w/ mobilization of splenic flexure and transverse colostomy on 3/1/20. pt left with open closure w/ wound vac. Surgery signed off on 3/9. Pt dc'd w/ wound vac and colostomy bag. Arrangement of home health for management of both arranged w/ the assistance of CM. Pt to f/u w/ Dr Wicho Lobo for cont'd outpt management of surgical wound. 
 
  
-Septic shock w/ MSOF POA- due to Prevotella bloodstream infection, above mentioned GI process. She was also noted to have S.epidermidis bacteremia. Per ID difficult to determine significance, was treated w/ a course of vanc for this. Has been managed with a course of abx including zosyn which she tolerated despite listed PCN allergy in her medical chart.  
  
-E.coli UTI s/p rx. 
  
-Acute on chronic respiratory failure-thought to be due to sepsis. Emergently intubated in the ED for airway protection, remained on vent post operatively, liberated from vent on 3/8/20 Stabilized back to 3LPM NC home 02 
  
-Acute on chronic diastolic heart failure-now stable, no evidence of decompensation. 
  
-P-afib discharged on Eliquis. HISTORY OF: 
Paraparesis, wheelchair bound Sarcoidosis COPD Cocaine and heroin abuse Today's examination of the patient revealed:  
 
Subjective:  
Pt had no complaints. Objective:  
VS:  
Visit Vitals /78 (BP 1 Location: Left arm, BP Patient Position: At rest) Pulse 82 Temp 97.6 °F (36.4 °C) Resp 18 Ht 5' 4\" (1.626 m) Wt 74.8 kg (165 lb) LMP 2012 (Exact Date) SpO2 100% BMI 28.32 kg/m² Tmax/24hrs: Temp (24hrs), Av.2 °F (36.8 °C), Min:97.6 °F (36.4 °C), Max:98.9 °F (37.2 °C) Input/Output:  
 
Intake/Output Summary (Last 24 hours) at 3/22/2020 1041 Last data filed at 3/22/2020 7731 Gross per 24 hour Intake 1320 ml Output 850 ml Net 470 ml General: alert, awake, in NAD Cardiovascular: rrr, no murmurs Pulmonary:  ctab GI:soft, nt, nd   
Extremities:  No edema Additional:   
 
Labs:   
Recent Results (from the past 24 hour(s)) GLUCOSE, POC Collection Time: 20 12:02 PM  
Result Value Ref Range Glucose (POC) 228 (H) 70 - 110 mg/dL GLUCOSE, POC Collection Time: 20  3:16 PM  
Result Value Ref Range Glucose (POC) 299 (H) 70 - 110 mg/dL GLUCOSE, POC Collection Time: 20  9:49 PM  
Result Value Ref Range Glucose (POC) 320 (H) 70 - 110 mg/dL GLUCOSE, POC Collection Time: 20  7:08 AM  
Result Value Ref Range Glucose (POC) 233 (H) 70 - 110 mg/dL Additional Data Reviewed: 
  
Condition on discharge: stable Disposition:  
 []Home   [x]Home with Home Health   []SNF/NH   []Rehab   []Home with family []Alternate Facility:____________________ Discharge Medications:  
 
Current Discharge Medication List  
  
START taking these medications Details  
!! amiodarone (CORDARONE) 200 mg tablet Take 1 Tab by mouth two (2) times a day for 2 days. Qty: 4 Tab, Refills: 0  
  
!! amiodarone (CORDARONE) 200 mg tablet Take 1 Tab by mouth daily. Qty: 60 Tab, Refills: 1  
  
amLODIPine (NORVASC) 5 mg tablet Take 1 Tab by mouth daily. Qty: 30 Tab, Refills: 1  
  
apixaban (ELIQUIS) 5 mg tablet Take 1 Tab by mouth two (2) times a day. Qty: 60 Tab, Refills: 1 furosemide (LASIX) 20 mg tablet Take 1 Tab by mouth daily. Qty: 30 Tab, Refills: 1  
  
pantoprazole (PROTONIX) 40 mg tablet Take 1 Tab by mouth Before breakfast and dinner. Qty: 60 Tab, Refills: 0  
  
spironolactone (ALDACTONE) 25 mg tablet Take 1 Tab by mouth daily. Qty: 30 Tab, Refills: 1 therapeutic multivitamin (THERAGRAN) tablet Take 1 Tab by mouth daily. Qty: 30 Tab, Refills: 1  
  
!! levothyroxine (SYNTHROID) 100 mcg tablet Take 1 Tab by mouth every morning. Qty: 30 Tab, Refills: 1  
  
magnesium oxide (MAG-OX) 400 mg tablet Take 1 Tab by mouth daily. Qty: 30 Tab, Refills: 1  
  
!! insulin lispro (HUMALOG) 100 unit/mL injection See insturctions Qty: 1 Vial, Refills: 0  
  
 !! - Potential duplicate medications found. Please discuss with provider. CONTINUE these medications which have CHANGED Details  
insulin glargine (LANTUS) 100 unit/mL injection 10 Units by SubCUTAneous route daily. Qty: 1 Vial, Refills: 2  
  
predniSONE (DELTASONE) 10 mg tablet Take 30 mg by mouth daily (with breakfast). Qty: 30 Tab, Refills: 1  
  
!! ARIPiprazole (ABILIFY) 10 mg tablet Take 1 Tab by mouth daily. Qty: 30 Tab, Refills: 0  
  
!! busPIRone (BUSPAR) 15 mg tablet Take 1 Tab by mouth two (2) times a day. Indications: repeated episodes of anxiety Qty: 60 Tab, Refills: 0  
  
 !! - Potential duplicate medications found. Please discuss with provider. CONTINUE these medications which have NOT CHANGED Details  
roflumilast (DALIRESP) 250 mcg tab Take 250 mcg by mouth daily. Qty: 30 Tab, Refills: 0  
  
!! ARIPiprazole (ABILIFY) 10 mg tablet Take 1 Tab by mouth. ipratropium (ATROVENT) 0.02 % soln   
  
tolterodine (DETROL) 1 mg tablet Take 1 Tab by mouth. traMADol (ULTRAM) 50 mg tablet Take 1 Tab by mouth. !! busPIRone (BUSPAR) 15 mg tablet Take 15 mg by mouth two (2) times a day. Indications: repeated episodes of anxiety Qty: 60 Tab, Refills: 2 OXYGEN-AIR DELIVERY SYSTEMS 3 L/min by Nasal route continuous. [employer] O2  
  
albuterol-ipratropium (DUO-NEB) 2.5 mg-0.5 mg/3 ml nebu 3 mL by Nebulization route every six (6) hours as needed for Wheezing. Qty: 30 Nebule, Refills: 1 cholecalciferol (VITAMIN D3) (1000 Units /25 mcg) tablet Take 1,000 Units by mouth two (2) times a day. !! levothyroxine (SYNTHROID) 100 mcg tablet Take 100 mcg by mouth Daily (before breakfast). aspirin 81 mg chewable tablet Take 1 Tab by mouth daily (with breakfast). Indications: stroke prevention 
Qty: 30 Tab, Refills: 0  
  
ascorbic acid, vitamin C, (VITAMIN C) 250 mg tablet Take 1 Tab by mouth daily (with breakfast). Qty: 30 Tab, Refills: 0 Associated Diagnoses: Acute blood loss as cause of postoperative anemia  
  
calcium citrate 200 mg (950 mg) tablet Take 1 Tab by mouth two (2) times a day. Indications: post-menopausal osteoporosis prevention 
Qty: 60 Tab, Refills: 0 Associated Diagnoses: Status post laminectomy with spinal fusion  
  
ferrous sulfate 325 mg (65 mg iron) tablet Take 1 Tab by mouth daily (with breakfast). Qty: 30 Tab, Refills: 0 Associated Diagnoses: Acute blood loss as cause of postoperative anemia  
  
acetaminophen (TYLENOL) 325 mg tablet Take 2 Tabs by mouth every four (4) hours as needed for Pain. Indications: pain 
Qty: 60 Tab, Refills: 0 Associated Diagnoses: Status post laminectomy with spinal fusion  
  
brief disposable (ADULT) misc by Does Not Apply route. Qty: 1 Package, Refills: 0 Associated Diagnoses: Urge urinary incontinence  
  
polyethylene glycol (MIRALAX) 17 gram/dose powder Take 17 g by mouth daily. 1 tablespoon with 8 oz of water daily 
Qty: 289 g, Refills: 0  
  
umeclidinium-vilanterol (ANORO ELLIPTA) 62.5-25 mcg/actuation inhaler Take 1 Puff by inhalation daily. Qty: 1 Inhaler, Refills: 0 BD INSULIN SYRINGE ULTRA-FINE 1 mL 31 gauge x 5/16 syrg rosuvastatin (CRESTOR) 5 mg tablet TAKE ONE TABLET NIGHTLY Qty: 90 Tab, Refills: 3  
  
albuterol (PROAIR HFA) 90 mcg/actuation inhaler INHALE 2 PUFFS EVERY 4 HOURS AS NEEDED FOR WHEEZING Qty: 1 Inhaler, Refills: 5  
  
!! insulin lispro (HUMALOG) 100 unit/mL injection To be given 15 min before meals: < 150 - None, 151-200 - 2 u, 201-250 - 4 u, 251-300 - 6 u, 301-350 - 8 u, 351-400 - 10 u, >400 - 12 u Qty: 1 Vial, Refills: 0 Associated Diagnoses: Type 2 diabetes mellitus with stage 3 chronic kidney disease, with long-term current use of insulin (HCC)  
  
sertraline (ZOLOFT) 50 mg tablet Take 50 mg by mouth daily. Indications: Bipolar Depression !! - Potential duplicate medications found. Please discuss with provider. STOP taking these medications  
  
 methadone (DOLOPHINE) 10 mg/mL solution Comments:  
Reason for Stopping:   
   
 azithromycin (ZITHROMAX) 250 mg tablet Comments:  
Reason for Stopping:   
   
 clindamycin (CLEOCIN) 150 mg capsule Comments:  
Reason for Stopping:   
   
 methocarbamoL (ROBAXIN) 500 mg tablet Comments:  
Reason for Stopping:   
   
 metOLazone (ZAROXOLYN) 2.5 mg tablet Comments:  
Reason for Stopping:   
   
 aspirin-calcium carbonate 81 mg-300 mg calcium(777 mg) tab Comments:  
Reason for Stopping:   
   
 divalproex DR (DEPAKOTE) 250 mg tablet Comments:  
Reason for Stopping:   
   
 doxycycline (MONODOX) 100 mg capsule Comments:  
Reason for Stopping:   
   
 gabapentin (NEURONTIN) 300 mg capsule Comments:  
Reason for Stopping:   
   
 methylPREDNISolone (MEDROL DOSEPACK) 4 mg tablet Comments:  
Reason for Stopping:   
   
 oxyCODONE-acetaminophen (PERCOCET) 5-325 mg per tablet Comments:  
Reason for Stopping:   
   
 traZODone (DESYREL) 100 mg tablet Comments:  
Reason for Stopping:   
   
 famotidine (PEPCID) 20 mg tablet Comments:  
Reason for Stopping:   
   
 docusate sodium (COLACE) 100 mg capsule Comments:  
Reason for Stopping: oxybutynin (DITROPAN) 5 mg tablet Comments:  
Reason for Stopping:   
   
 allopurinol (ZYLOPRIM) 100 mg tablet Comments:  
Reason for Stopping:   
   
 insulin syringe,safetyneedle 1 mL 30 gauge x 5/16\" syrg Comments:  
Reason for Stopping:   
   
 Nebulizer Accessories Kit Comments:  
Reason for Stopping: Follow-up Appointments:  
1. Your PCP: Susan Miller NP, within [id number] 2. General Surgery, Dr Ebonie Sosa in 2-3 wks >30 minutes spent coordinating this discharge (review instructions/follow-up, prescriptions, preparing report for sign off) Signed: 
Luanna Schilder, MD 
3/22/2020 
10:41 AM

## 2020-03-23 NOTE — PROGRESS NOTES
Daughter called. patient has knot on her right abdomen and colostomy has not produced anything since getting home. VAC is draining cloudy tan drainage and there is sill yellow to green liquid from rectum. Patient has been eating and drinking and is not complaining of pain or nausea. Provided patient's daughter with  surgical specialist number. Daughter called back saying there was no way to leave a message or request an oncall dr. Zara Samaniego called back and was given the number for the [facility] Surgical specialist office.   CTN advised daughter if there was no way to talk to a doctor and she was concerned with her mothers condition then she should follow up in the ED

## 2020-03-23 NOTE — PROGRESS NOTES
Patient contacted regarding recent discharge and COVID-19 risk. Patient admitted for perforated bowel Care Transition Nurse/ Ambulatory Care Manager contacted the patient by telephone to perform post discharge assessment. Verified name and  with patient as identifiers. Patient has following risk factors of: diabetes, COPD. CTN/ACM reviewed discharge instructions, medical action plan and red flags related to discharge diagnosis. Reviewed and educated them on any new and changed medications related to discharge diagnosis. Advised obtaining a 90-day supply of all daily and as-needed medications. Education provided regarding infection prevention, and signs and symptoms of COVID-19 and when to seek medical attention with patient who verbalized understanding. Discussed exposure protocols and quarantine from [address] you at higher risk for severe illness  and given an opportunity for questions and concerns. The patient agrees to contact the COVID-19 hotline [phone] or PCP office for questions related to their healthcare. CTN/ACM provided contact information for future reference. From CDC: Are you at higher risk for severe illness?  Wash your hands often.  Avoid close contact (6 feet, which is about two arm lengths) with people who are sick.  Put distance between yourself and other people if COVID-19 is spreading in your community.  Clean and disinfect frequently touched surfaces.  Avoid all cruise travel and non-essential air travel.  Call your healthcare professional if you have concerns about COVID-19 and your underlying condition or if you are sick. For more information on steps you can take to protect yourself, see CDC's How to Protect Yourself

## 2020-03-23 NOTE — HOME CARE
Discharge noted over the weekend. Referral processed by [facility] central office. [facility] to follow. Dolores rBuno, KIMBERLYN  
[address] [facility] Liaison [phone]

## 2020-03-23 NOTE — TELEPHONE ENCOUNTER
Received call from patient's daughter stating that patient is not having output from ostomy. Patient is having loose yellow stool per rectum. Daughter denies nausea and vomiting. Daughter not aware of temp (no thermometer). Encouraged patient to call to speak with Dr. Carolina Khan directly, number provided.

## 2020-03-23 NOTE — PROGRESS NOTES
received call from patient's daughter.   Reviewed information that I went over with the patient and also made her aware of upcoming appointments

## 2020-04-06 NOTE — PROGRESS NOTES
Patient resolved from Transition of Care episode on 4/6/2020   Patient/family has been provided the following resources and education related to COVID-19:                         Signs, symptoms and red flags related to COVID-19            CDC exposure and quarantine guidelines            [facility] exposure contact - [phone]            Contact for their local Department of Health                 Message left on VM making patient aware of the above and making myself available if the patient had any questions. No further outreach scheduled with this CTN/ACM. Episode of Care resolved. Patient has this CTN/ACM contact information if future needs arise.

## 2020-04-08 PROBLEM — I48.0 PAF (PAROXYSMAL ATRIAL FIBRILLATION) (HCC): Status: ACTIVE | Noted: 2020-01-01

## 2020-04-08 NOTE — PROGRESS NOTES
1. Have you been to the ER, urgent care clinic since your last visit? Hospitalized since your last visit? 2. Have you seen or consulted any other health care providers outside of the [address] [place] [address] since your last visit? Include any pap smears or colon screening.

## 2020-04-08 NOTE — PROGRESS NOTES
Consent: Valeria Vieyra, who was seen by synchronous (real-time) audio-video technology, and/or her healthcare decision maker, is aware that this patient-initiated, Telehealth encounter on 4/8/2020 is a billable service, with coverage as determined by her insurance carrier. She is aware that she may receive a bill and has provided verbal consent to proceed: Yes. Subjective:  
Valeria Vieyra is a 62 y.o. female who was seen for Hospital Follow Up (Patient was in [facility]) and Cholesterol Problem Patient seen today for virtual visit. She has diastolic heart failure, pulmonary hypertension, emphysema and hypertension. She also has abnormal nuclear stress test 
 
Patient was admitted 3/2020 with multiple problem including 1. Paroxymal Atrial flutter/atrial fibrillation  with RVR- maintaining NSR- continue  amiodarone po. On Eliquis 5 mg bid for stroke prevention 2. Anemia- H&H low but stable 3. Hypertension- stable continue  Norvasc. 4. Acute respiratory Failure- intubated. Extubated 3/7/20 - on O2 by Nasal canula 5. Acute Sigmoid Colon Perforation - s/p Emergent Ex-Lap with drainage of intraabdominal stool and collections; sigmoid colectomy with Dr. Ange Randall on 02/29/20.sigmoid perforation, secondary peritonitis  S/p Re-exploratory laparotomy, drainage of intra-abdominal stool collection, descending colectomy with mobilization of the splenic flexure, and transverse colostomy on 3/1. On follow-up she remains in wheelchair. She is not mobile. 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 She remains on home oxygen. She has no fever chills. Denies any chest pain Family History Problem Relation Age of Onset  Seizures Other  Diabetes Mother  Hypertension Mother  Heart Disease Mother  Cancer Mother  Diabetes Father  Stroke Father  Heart Disease Father  Headache Sister  Depression Neg Hx  Suicide Neg Hx  Psychotic Disorder Neg Hx  Substance Abuse Neg Hx  Dementia Neg Hx Past Surgical History:  
Procedure Laterality Date Toño  HX  SECTION    
 HX CYST REMOVAL Left  Left foot  HX OTHER SURGICAL Left 2017 S/P incision and drainage of the abscess of the left hand and the left foot (2017 - Dr. Da Moncada. Marylee Tamanna)  HX THORACIC LAMINECTOMY  2019 S/P T10, T9, T8 laminectomy; T7, T8, T9, T10, T11, T12 posterior thoracic fusion; segmental spinal instrumentation; K2M Davis type, T7-T8, T11-T12 (2019 - Dr. Avery Partida)  HX TUBAL LIGATION Allergies Allergen Reactions  Moxifloxacin Rash  Pcn [Penicillins] Swelling  Sulfa (Sulfonamide Antibiotics) Swelling Current Outpatient Medications:  
  amiodarone (CORDARONE) 200 mg tablet, Take 1 Tab by mouth daily. , Disp: 60 Tab, Rfl: 3 
  amLODIPine (NORVASC) 5 mg tablet, Take 1 Tab by mouth daily. , Disp: 30 Tab, Rfl: 3 
  apixaban (ELIQUIS) 5 mg tablet, Take 1 Tab by mouth two (2) times a day., Disp: 60 Tab, Rfl: 3 
  spironolactone (ALDACTONE) 25 mg tablet, Take 1 Tab by mouth daily. , Disp: 30 Tab, Rfl: 3 
  predniSONE (DELTASONE) 10 mg tablet, Take 30 mg by mouth daily (with breakfast). , Disp: 90 Tab, Rfl: 1 
  white petrolatum (Protective Ointment) topical ointment, Apply 1 Applicator to affected area two (2) times a week., Disp: , Rfl:  
  insulin glargine (LANTUS) 100 unit/mL injection, 10 Units by SubCUTAneous route daily. , Disp: 1 Vial, Rfl: 2 
  pantoprazole (PROTONIX) 40 mg tablet, Take 1 Tab by mouth Before breakfast and dinner., Disp: 60 Tab, Rfl: 0 
  therapeutic multivitamin (THERAGRAN) tablet, Take 1 Tab by mouth daily. , Disp: 30 Tab, Rfl: 1 
  magnesium oxide (MAG-OX) 400 mg tablet, Take 1 Tab by mouth daily. , Disp: 30 Tab, Rfl: 1   roflumilast (DALIRESP) 250 mcg tab, Take 250 mcg by mouth daily. , Disp: 30 Tab, Rfl: 0 
  ipratropium (ATROVENT) 0.02 % soln, , Disp: , Rfl:  
   tolterodine (DETROL) 1 mg tablet, Take 1 Tab by mouth., Disp: , Rfl:  
  traMADol (ULTRAM) 50 mg tablet, Take 1 Tab by mouth., Disp: , Rfl:  
  busPIRone (BUSPAR) 15 mg tablet, Take 15 mg by mouth two (2) times a day. Indications: repeated episodes of anxiety, Disp: 60 Tab, Rfl: 2 
  OXYGEN-AIR DELIVERY SYSTEMS, 3 L/min by Nasal route continuous. [employer] O2, Disp: , Rfl:  
  albuterol-ipratropium (DUO-NEB) 2.5 mg-0.5 mg/3 ml nebu, 3 mL by Nebulization route every six (6) hours as needed for Wheezing., Disp: 30 Nebule, Rfl: 1   cholecalciferol (VITAMIN D3) (1000 Units /25 mcg) tablet, Take 1,000 Units by mouth two (2) times a day., Disp: , Rfl:  
  levothyroxine (SYNTHROID) 100 mcg tablet, Take 100 mcg by mouth Daily (before breakfast). , Disp: , Rfl:  
  aspirin 81 mg chewable tablet, Take 1 Tab by mouth daily (with breakfast). Indications: stroke prevention, Disp: 30 Tab, Rfl: 0 
  ascorbic acid, vitamin C, (VITAMIN C) 250 mg tablet, Take 1 Tab by mouth daily (with breakfast). , Disp: 30 Tab, Rfl: 0 
  calcium citrate 200 mg (950 mg) tablet, Take 1 Tab by mouth two (2) times a day. Indications: post-menopausal osteoporosis prevention, Disp: 60 Tab, Rfl: 0 
  ferrous sulfate 325 mg (65 mg iron) tablet, Take 1 Tab by mouth daily (with breakfast). , Disp: 30 Tab, Rfl: 0 
  acetaminophen (TYLENOL) 325 mg tablet, Take 2 Tabs by mouth every four (4) hours as needed for Pain. Indications: pain, Disp: 60 Tab, Rfl: 0 
  brief disposable (ADULT) misc, by Does Not Apply route., Disp: 1 Package, Rfl: 0 
  polyethylene glycol (MIRALAX) 17 gram/dose powder, Take 17 g by mouth daily. 1 tablespoon with 8 oz of water daily, Disp: 289 g, Rfl: 0 
  BD INSULIN SYRINGE ULTRA-FINE 1 mL 31 gauge x 5/16 syrg, , Disp: , Rfl:  
  rosuvastatin (CRESTOR) 5 mg tablet, TAKE ONE TABLET NIGHTLY (Patient taking differently: Take 5 mg by mouth nightly. TAKE ONE TABLET NIGHTLY), Disp: 90 Tab, Rfl: 3   albuterol (PROAIR HFA) 90 mcg/actuation inhaler, INHALE 2 PUFFS EVERY 4 HOURS AS NEEDED FOR WHEEZING, Disp: 1 Inhaler, Rfl: 5 
  insulin lispro (HUMALOG) 100 unit/mL injection, To be given 15 min before meals: < 150 - None, 151-200 - 2 u, 201-250 - 4 u, 251-300 - 6 u, 301-350 - 8 u, 351-400 - 10 u, >400 - 12 u (Patient taking differently: by SubCUTAneous route three (3) times daily as needed for Other (subcutaneous three times daily as needed before meals per sliding scale. ). To be given 15 min before meals: < 150 - None, 151-200 - 2 u, 201-250 - 4 u, 251-300 - 6 u, 301-350 - 8 u, 351-400 - 10 u, >400 - 12 u), Disp: 1 Vial, Rfl: 0 
  sertraline (ZOLOFT) 50 mg tablet, Take 50 mg by mouth daily. Indications: Bipolar Depression, Disp: , Rfl:  
  furosemide (LASIX) 20 mg tablet, Take 1 Tab by mouth daily. , Disp: 30 Tab, Rfl: 1 Allergies Allergen Reactions  Moxifloxacin Rash  Pcn [Penicillins] Swelling  Sulfa (Sulfonamide Antibiotics) Swelling Review of Systems Review of Systems Constitutional: Negative for chills and fever. HENT: Negative for nosebleeds. Eyes: Negative for blurred vision and double vision. Respiratory: See HPI Cardiovascular: See HPI Gastrointestinal: Negative for abdominal pain, heartburn, nausea and vomiting. Musculoskeletal: Negative for myalgias. Skin: Negative for rash. Neurological: Negative for dizziness, weakness and headaches. Endo/Heme/Allergies: Does not bruise/bleed easily. Objective:  
 
Visit Vitals  5' 4\" (1.626 m) Wt 74.8 kg (165 lb) LMP 11/25/2012 (Exact Date) BMI 28.32 kg/m² General: alert, cooperative, no distress Mental  status: normal mood, behavior, speech, dress, motor activity, and thought processes, able to follow commands HENT: NCAT Neck: no visualized mass,No JVD Resp: no respiratory distress Neuro: no gross deficits Skin: no discoloration or Bruise on visible areas Psychiatric: normal affect, consistent with stated mood, no evidence of hallucinations Additional exam findings:  
 
Extremities:No edema Pertinent LAB and reports I have reviewed available  pertinent notes, labs and reports and included in current evaluation and management of this patient. Assessment & Plan:  
Diagnoses and all orders for this visit: 1. Diastolic CHF, chronic (HCC) Comments: 
Stable. Class III shortness of breath multifactorial continue treatment 2. Pulmonary HTN ([facility] [address] Comments: 
Mostly group 2 and 3. Continue home oxygen and treatment 3. Pulmonary emphysema, unspecified emphysema type ([address] Comments: On oxygen at home continue treatment short of breath with activity 4. Essential hypertension Comments: 
Stable monitor 5. Abnormal nuclear stress test 
Comments: 
Stable on medical management monitor 6. PAF (paroxysmal atrial fibrillation) ([facility] [address] Comments: 
Noted in hospital.  She was started on amiodarone and Eliquis continue current treatment stable Other orders 
-     amiodarone (CORDARONE) 200 mg tablet; Take 1 Tab by mouth daily. -     amLODIPine (NORVASC) 5 mg tablet; Take 1 Tab by mouth daily. -     apixaban (ELIQUIS) 5 mg tablet; Take 1 Tab by mouth two (2) times a day. -     spironolactone (ALDACTONE) 25 mg tablet; Take 1 Tab by mouth daily. 4/2020 Patient had recent discharge after admission for bowel perforation. She had paroxysmal atrial fibrillation in hospital started on amiodarone and Eliquis which is continued. CHF compensated normal ejection fraction pulmonary hypertension stable. Continue oxygen and other treatment. Medications refilled Follow-up and Dispositions · Return in about 3 months (around 7/8/2020). [address] We discussed the expected course, resolution and complications of the diagnosis(es) in detail.   Medication risks, benefits, costs, interactions, and alternatives were discussed as indicated. I advised her to contact the office if her condition worsens, changes or fails to improve as anticipated. She expressed understanding with the diagnosis(es) and plan. Liang Desir is a 62 y.o. female being evaluated by a video visit encounter for concerns as above. A caregiver was present when appropriate. Due to this being a TeleHealth encounter (During GYK-17 public health emergency), evaluation of the following organ systems was limited: Vitals/Constitutional/EENT/Resp/CV/GI//MS/Neuro/Skin/Heme-Lymph-Imm. Pursuant to the emergency declaration under the [facility], [facility]5 waiver authority and the [employer] and [employer] Act, this Virtual  Visit was conducted, with patient's (and/or legal guardian's) consent, to reduce the patient's risk of exposure to COVID-19 and provide necessary medical care. Services were provided through a video synchronous discussion virtually to substitute for in-person clinic visit. I was in the office while conducting this encounter.  
 
Charles Hodges MD

## 2020-05-01 NOTE — PROGRESS NOTES
See also email regarding plan of care. Despite abdominal digression, please continue therapy. Patient has follow up appointment for wound eval 5/4/2020.

## 2020-05-04 NOTE — TELEPHONE ENCOUNTER
Pt states she is coughing up brown sputum and needs something for that. Please call . Also needs refills on proair and anoro.

## 2020-05-04 NOTE — TELEPHONE ENCOUNTER
Both medications have already been sent for refills. Pt states she has a cough. Sputum clear and sometimes brown. Not taking any med for cough. Is using Musinex to loosen secretions which she states is helping. Will get Delsym for cough.  If sxs persist or worsen to call office

## 2020-05-07 NOTE — PROGRESS NOTES
Patient seen and examined. She is doing relatively well. She is tolerating regular diet and she denies any abdominal pain. Her colostomy is functioning. Her midline wound is healing well with a small opening in the middle of it with some minimal drainage but no sign of infection. Follow-up with me in 3 to 6 months.

## 2020-05-07 NOTE — PATIENT INSTRUCTIONS
If you have any questions or concerns about today's appointment, the verbal and/or written instructions you were given for follow up care, please call our office at [phone]. [address] [facility] - [address]    [phone] office  [phone]

## 2020-05-16 PROBLEM — J18.9 HCAP (HEALTHCARE-ASSOCIATED PNEUMONIA): Status: ACTIVE | Noted: 2020-01-01

## 2020-05-16 PROBLEM — E87.6 HYPOKALEMIA: Status: ACTIVE | Noted: 2020-01-01

## 2020-05-16 PROBLEM — R06.03 RESPIRATORY DISTRESS: Status: ACTIVE | Noted: 2020-01-01

## 2020-05-16 PROBLEM — E83.42 HYPOMAGNESEMIA: Status: ACTIVE | Noted: 2020-01-01

## 2020-05-16 PROBLEM — R09.02 HYPOXIA: Status: ACTIVE | Noted: 2020-01-01

## 2020-05-16 PROBLEM — I27.20 PULMONARY HYPERTENSION, MODERATE TO SEVERE (HCC): Status: ACTIVE | Noted: 2020-01-01

## 2020-05-16 PROBLEM — R17 ELEVATED BILIRUBIN: Status: ACTIVE | Noted: 2020-01-01

## 2020-05-16 PROBLEM — F19.11 HISTORY OF DRUG ABUSE (HCC): Status: ACTIVE | Noted: 2020-01-01

## 2020-05-16 PROBLEM — J44.1 COPD EXACERBATION (HCC): Status: ACTIVE | Noted: 2020-01-01

## 2020-05-16 PROBLEM — F19.10 POLYSUBSTANCE ABUSE (HCC): Status: ACTIVE | Noted: 2020-01-01

## 2020-05-16 PROBLEM — J96.21 ACUTE ON CHRONIC RESPIRATORY FAILURE WITH HYPOXEMIA (HCC): Status: ACTIVE | Noted: 2020-01-01

## 2020-05-16 NOTE — ED PROVIDER NOTES
EMERGENCY DEPARTMENT HISTORY AND PHYSICAL EXAM 
 
Date: 5/16/2020 Patient Name: Jojo Plaza History of Presenting Illness Chief Complaint Patient presents with  Respiratory Distress History Provided By: Patient Additional History (Context): Jojo Plaza is a 62 y.o. female with hypertension, COPD and drug abuse on methadone; CHF, recent perforated viscous w/colostomy; afib, bipolar who presents with shortness of breath this morning. Patient is on 3 L of oxygen at home. EMS was called by her daughter; was only 55% upon arrival.  He is on 15 L and is at 94%. Her pulmonologist is Dr. Serge Ragland. Her cardiologist is Dr. Kurt Arora. Patient has been x2 days on methadone 20 mg. Daughter informed me that her mother could have been on any kind of street drug. PCP: Lashanda Cedeño NP Current Facility-Administered Medications Medication Dose Route Frequency Provider Last Rate Last Dose  sodium chloride (NS) flush 5-10 mL  5-10 mL IntraVENous PRN Nhi Pittman PA      
 levoFLOXacin (LEVAQUIN) 750 mg in D5W IVPB  750 mg IntraVENous Q24H Nhi Pittman PA   Stopped at 05/16/20 1307  diphenhydrAMINE (BENADRYL) injection 25 mg  25 mg IntraVENous ONCE Nhi Pittman PA   Stopped at 05/16/20 1127  
 naloxone [facility]) injection 2 mg  2 mg IntraVENous NOW Nhi Pittman PA   Stopped at 05/16/20 1141  
 [START ON 5/17/2020] vancomycin (VANCOCIN) 1,000 mg in 0.9% sodium chloride (MBP/ADV) 250 mL adv  1,000 mg IntraVENous Q12H Estela Yarbrough MD      
 metroNIDAZOLE (FLAGYL) IVPB premix 500 mg  500 mg IntraVENous NOW Nhi Pittman  mL/hr at 05/16/20 1834 500 mg at 05/16/20 1834  
 magnesium sulfate 1 g/100 ml IVPB (premix or compounded)  1 g IntraVENous Q1H Nhi Pittman  mL/hr at 05/16/20 1813 1 g at 05/16/20 1813 Current Outpatient Medications Medication Sig Dispense Refill  umeclidinium-vilanteroL (Anoro Ellipta) 62.5-25 mcg/actuation inhaler INHALE 1 PUFF DAILY 1 Inhaler 5  
 albuterol (ProAir HFA) 90 mcg/actuation inhaler INHALE 2 PUFFS EVERY 4 HOURS AS NEEDED FOR WHEEZING 8.5 Inhaler 4  furosemide (LASIX) 20 mg tablet Take 1 Tab by mouth daily. 90 Tab 1  
 apixaban (ELIQUIS) 5 mg tablet Take 1 Tab by mouth two (2) times a day. 60 Tab 3  
 amiodarone (CORDARONE) 200 mg tablet Take 1 Tab by mouth daily. 60 Tab 3  
 amLODIPine (NORVASC) 5 mg tablet Take 1 Tab by mouth daily. 30 Tab 3  
 spironolactone (ALDACTONE) 25 mg tablet Take 1 Tab by mouth daily. 30 Tab 3  predniSONE (DELTASONE) 10 mg tablet Take 30 mg by mouth daily (with breakfast). 90 Tab 1  
 white petrolatum (Protective Ointment) topical ointment Apply 1 Applicator to affected area two (2) times a week.  insulin glargine (LANTUS) 100 unit/mL injection 10 Units by SubCUTAneous route daily. 1 Vial 2  pantoprazole (PROTONIX) 40 mg tablet Take 1 Tab by mouth Before breakfast and dinner. 60 Tab 0  
 therapeutic multivitamin (THERAGRAN) tablet Take 1 Tab by mouth daily. 30 Tab 1  
 magnesium oxide (MAG-OX) 400 mg tablet Take 1 Tab by mouth daily. 30 Tab 1  
 roflumilast (DALIRESP) 250 mcg tab Take 250 mcg by mouth daily. 30 Tab 0  
 tolterodine (DETROL) 1 mg tablet Take 1 Tab by mouth.  traMADol (ULTRAM) 50 mg tablet Take 1 Tab by mouth.  busPIRone (BUSPAR) 15 mg tablet Take 15 mg by mouth two (2) times a day. Indications: repeated episodes of anxiety 60 Tab 2  
 OXYGEN-AIR DELIVERY SYSTEMS 3 L/min by Nasal route continuous. [employer] O2    
 albuterol-ipratropium (DUO-NEB) 2.5 mg-0.5 mg/3 ml nebu 3 mL by Nebulization route every six (6) hours as needed for Wheezing. 30 Nebule 1  cholecalciferol (VITAMIN D3) (1000 Units /25 mcg) tablet Take 1,000 Units by mouth daily.  levothyroxine (SYNTHROID) 100 mcg tablet Take 100 mcg by mouth Daily (before breakfast).     
 aspirin 81 mg chewable tablet Take 1 Tab by mouth daily (with breakfast). Indications: stroke prevention 30 Tab 0  
 ascorbic acid, vitamin C, (VITAMIN C) 250 mg tablet Take 1 Tab by mouth daily (with breakfast). 30 Tab 0  
 calcium citrate 200 mg (950 mg) tablet Take 1 Tab by mouth two (2) times a day. Indications: post-menopausal osteoporosis prevention 60 Tab 0  
 ferrous sulfate 325 mg (65 mg iron) tablet Take 1 Tab by mouth daily (with breakfast). 30 Tab 0  
 acetaminophen (TYLENOL) 325 mg tablet Take 2 Tabs by mouth every four (4) hours as needed for Pain. Indications: pain 60 Tab 0  
 brief disposable (ADULT) misc by Does Not Apply route. 1 Package 0  
 polyethylene glycol (MIRALAX) 17 gram/dose powder Take 17 g by mouth daily. 1 tablespoon with 8 oz of water daily 289 g 0  
 BD INSULIN SYRINGE ULTRA-FINE 1 mL 31 gauge x 5/16 syrg  rosuvastatin (CRESTOR) 5 mg tablet TAKE ONE TABLET NIGHTLY (Patient taking differently: Take 5 mg by mouth nightly. TAKE ONE TABLET NIGHTLY) 90 Tab 3  
 insulin lispro (HUMALOG) 100 unit/mL injection To be given 15 min before meals: < 150 - None, 151-200 - 2 u, 201-250 - 4 u, 251-300 - 6 u, 301-350 - 8 u, 351-400 - 10 u, >400 - 12 u (Patient taking differently: by SubCUTAneous route three (3) times daily as needed for Other (subcutaneous three times daily as needed before meals per sliding scale. ). To be given 15 min before meals: < 150 - None, 151-200 - 2 u, 201-250 - 4 u, 251-300 - 6 u, 301-350 - 8 u, 351-400 - 10 u, >400 - 12 u) 1 Vial 0  
 sertraline (ZOLOFT) 50 mg tablet Take 50 mg by mouth daily. Indications: Bipolar Depression Past History Past Medical History: 
Past Medical History:  
Diagnosis Date  Abnormally low high density lipoprotein (HDL) cholesterol with hypertriglyceridemia Lipid profile (11/6/2016) showed , , HDL 38, LDL 37  Acute blood loss as cause of postoperative anemia 12/12/2019  Anxiety  B[facility] disorder [facility] 12/5/2012  Cellulitis of right forearm 2017  Chronic back pain  Chronic obstructive pulmonary disease (COPD) ([address] SOB, on [place] O2 Recent admission with psychosis  Chronic respiratory failure with hypoxia ([address] On home oxygen inhalation at 3 LPM via NC  
 Contusion of left elbow 2017  Depression  Diabetic neuropathy associated with type 2 diabetes mellitus ([address]  Diastolic dysfunction without heart failure Stable on diuretics  Falls frequently  Gastroesophageal reflux disease with hiatal hernia  Generalized osteoarthritis of multiple sites  Gout On Allopurinol  History of acute renal failure 2013  History of back injury   
 jumped out of second story window  History of fracture due to fall 2019  
 History of hepatitis C   
 treated  History of penicillin allergy  History of vitamin D deficiency 5/10/2017  Hypertensive heart disease without heart failure Better controlled  Hyperuricemia 2017  Hypothyroidism  Hypoxemia requiring supplemental oxygen 2014  Intravenous drug user 2017  Long term current use of aspirin  Memory difficulty  Menopause  Mixed connective tissue disease ([address] 5/10/2017  Obesity, Class I, BMI 30-34.9  Olecranon bursitis of right elbow 2017  Opioid dependence ([address] On Methadone  Recurrent genital herpes 2013  Right Achilles tendinitis 2017  Sarcoidosis  Sickle cell trait ([address]  Tobacco use disorder  Type 2 diabetes mellitus with diabetic neuropathy, with long-term current use of insulin ([address] HbA1c (2019) = 7.1  Urge urinary incontinence 5/10/2017  Venous insufficiency  Wears glasses Past Surgical History: 
Past Surgical History:  
Procedure Laterality Date Toño  HX  SECTION    
 HX CYST REMOVAL Left  Left foot  HX OTHER SURGICAL Left 2017 S/P incision and drainage of the abscess of the left hand and the left foot (4/17/2017 - Dr. Elena Tellez. Noah Keyon)  HX THORACIC LAMINECTOMY  12/11/2019 S/P T10, T9, T8 laminectomy; T7, T8, T9, T10, T11, T12 posterior thoracic fusion; segmental spinal instrumentation; K2M [place] type, T7-T8, T11-T12 (12/11/2019 - Dr. Faheem Vazquez)  HX TUBAL LIGATION Family History: 
Family History Problem Relation Age of Onset  Seizures Other  Diabetes Mother  Hypertension Mother  Heart Disease Mother  Cancer Mother  Diabetes Father  Stroke Father  Heart Disease Father  Headache Sister  Depression Neg Hx  Suicide Neg Hx  Psychotic Disorder Neg Hx  Substance Abuse Neg Hx  Dementia Neg Hx Social History: 
Social History Tobacco Use  Smoking status: Current Every Day Smoker Packs/day: 1.00 Years: 30.00 Pack years: 30.00 Types: Cigarettes Start date: 12/2/1969  Smokeless tobacco: Never Used Substance Use Topics  Alcohol use: No  
 Drug use: No  
  Types: Cocaine, Heroin Comment: +Cocaine Screen 2013 Allergies: Allergies Allergen Reactions  Moxifloxacin Rash  Pcn [Penicillins] Swelling  Sulfa (Sulfonamide Antibiotics) Swelling Review of Systems Review of Systems Unable to perform ROS: Severe respiratory distress Constitutional: Positive for fever. Respiratory: Positive for shortness of breath and wheezing. All Other Systems Negative Physical Exam  
 
Vitals:  
 05/16/20 1310 05/16/20 1611 05/16/20 1632 05/16/20 1805 BP: 98/67 Pulse: 94 62 64 61 Resp: (!) 52 20 Temp:      
SpO2:      
Weight:      
Height:      
 
Physical Exam 
Vitals signs and nursing note reviewed. Constitutional:   
   General: She is in acute distress. Appearance: She is well-developed. HENT:  
   Head: Normocephalic and atraumatic.   
   Right Ear: External ear normal.  
 Left Ear: External ear normal.  
   Nose: Nose normal.  
Eyes:  
   Conjunctiva/sclera: Conjunctivae normal.  
   Pupils: Pupils are equal, round, and reactive to light. Neck: Musculoskeletal: Normal range of motion and neck supple. Vascular: No JVD. Trachea: No tracheal deviation. Cardiovascular:  
   Rate and Rhythm: Regular rhythm. Bradycardia present. Heart sounds: Normal heart sounds. No murmur. No friction rub. No gallop. Pulmonary:  
   Effort: Pulmonary effort is normal. No respiratory distress. Breath sounds: Wheezing present. No rales. Abdominal:  
   General: Bowel sounds are normal. There is no distension. Palpations: Abdomen is soft. There is no mass. Tenderness: There is no abdominal tenderness. There is no guarding or rebound. Comments: Colostomy in place. Musculoskeletal: Normal range of motion. General: No tenderness. Skin: 
   General: Skin is warm and dry. Findings: No rash. Neurological:  
   Mental Status: She is alert and oriented to person, place, and time. Cranial Nerves: No cranial nerve deficit. Deep Tendon Reflexes: Reflexes are normal and symmetric. Psychiatric:     
   Behavior: Behavior normal.  
 
  
 
 
 
Diagnostic Study Results Labs - Recent Results (from the past 12 hour(s)) METABOLIC PANEL, COMPREHENSIVE Collection Time: 05/16/20 11:00 AM  
Result Value Ref Range Sodium 135 (L) 136 - 145 mmol/L Potassium 3.2 (L) 3.5 - 5.5 mmol/L Chloride 93 (L) 100 - 111 mmol/L  
 CO2 36 (H) 21 - 32 mmol/L Anion gap 6 3.0 - 18 mmol/L Glucose 171 (H) 74 - 99 mg/dL BUN 16 7.0 - 18 MG/DL Creatinine 1.00 0.6 - 1.3 MG/DL  
 BUN/Creatinine ratio 16 12 - 20 GFR est AA >60 >60 ml/min/1.73m2 GFR est non-AA 56 (L) >60 ml/min/1.73m2 Calcium 7.9 (L) 8.5 - 10.1 MG/DL  Bilirubin, total 1.2 (H) 0.2 - 1.0 MG/DL  
 ALT (SGPT) 29 13 - 56 U/L  
 AST (SGOT) 48 (H) 10 - 38 U/L  
 Alk. phosphatase 81 45 - 117 U/L Protein, total 7.8 6.4 - 8.2 g/dL Albumin 3.1 (L) 3.4 - 5.0 g/dL Globulin 4.7 (H) 2.0 - 4.0 g/dL A-G Ratio 0.7 (L) 0.8 - 1.7    
CBC WITH AUTOMATED DIFF Collection Time: 05/16/20 11:00 AM  
Result Value Ref Range WBC 18.6 (H) 4.6 - 13.2 K/uL  
 RBC 5.21 4.20 - 5.30 M/uL  
 HGB 13.4 12.0 - 16.0 g/dL HCT 43.7 35.0 - 45.0 % MCV 83.9 74.0 - 97.0 FL  
 MCH 25.7 24.0 - 34.0 PG  
 MCHC 30.7 (L) 31.0 - 37.0 g/dL  
 RDW 17.4 (H) 11.6 - 14.5 % PLATELET 983 209 - 387 K/uL MPV 9.2 9.2 - 11.8 FL  
 NEUTROPHILS 76 (H) 42 - 75 % LYMPHOCYTES 21 20 - 51 % MONOCYTES 3 2 - 9 % EOSINOPHILS 0 0 - 5 % BASOPHILS 0 0 - 3 %  
 ABS. NEUTROPHILS 14.1 (H) 1.8 - 8.0 K/UL  
 ABS. LYMPHOCYTES 3.9 (H) 0.8 - 3.5 K/UL  
 ABS. MONOCYTES 0.6 0 - 1.0 K/UL  
 ABS. EOSINOPHILS 0.0 0.0 - 0.4 K/UL  
 ABS. BASOPHILS 0.0 0.0 - 0.06 K/UL  
 DF MANUAL PLATELET COMMENTS ADEQUATE PLATELETS    
 RBC COMMENTS ANISOCYTOSIS 3+ 
    
 RBC COMMENTS POLYCHROMASIA 1+ TROPONIN I Collection Time: 05/16/20 11:00 AM  
Result Value Ref Range Troponin-I, QT 0.04 0.0 - 0.045 NG/ML  
NT-PRO BNP Collection Time: 05/16/20 11:00 AM  
Result Value Ref Range NT pro- 0 - 900 PG/ML  
PROTHROMBIN TIME + INR Collection Time: 05/16/20 11:00 AM  
Result Value Ref Range Prothrombin time 16.9 (H) 11.5 - 15.2 sec INR 1.4 (H) 0.8 - 1.2 ETHYL ALCOHOL Collection Time: 05/16/20 11:00 AM  
Result Value Ref Range ALCOHOL(ETHYL),SERUM <3 0 - 3 MG/DL MAGNESIUM Collection Time: 05/16/20 11:00 AM  
Result Value Ref Range Magnesium 1.6 1.6 - 2.6 mg/dL POC LACTIC ACID Collection Time: 05/16/20 11:08 AM  
Result Value Ref Range Lactic Acid (POC) 1.57 0.40 - 2.00 mmol/L  
EKG, 12 LEAD, INITIAL Collection Time: 05/16/20 11:38 AM  
Result Value Ref Range Ventricular Rate 97 BPM  
 Atrial Rate 97 BPM  
 P-R Interval 112 ms QRS Duration 84 ms Q-T Interval 352 ms QTC Calculation (Bezet) 447 ms Calculated P Axis 68 degrees Calculated R Axis -25 degrees Calculated T Axis 45 degrees Diagnosis Sinus rhythm with occasional atrial-paced complexes Possible Left atrial enlargement Nonspecific ST and T wave abnormality Abnormal ECG When compared with ECG of 10-MAR-2020 12:44, Electronic atrial pacemaker has replaced Sinus rhythm POC G3 Collection Time: 05/16/20 11:53 AM  
Result Value Ref Range Device: AEROSOL MASK    
 pH (POC) 7.448 7.35 - 7.45    
 pCO2 (POC) 56.0 (H) 35.0 - 45.0 MMHG  
 pO2 (POC) 56 (L) 80 - 100 MMHG  
 HCO3 (POC) 38.8 (H) 22 - 26 MMOL/L  
 sO2 (POC) 89 (L) 92 - 97 % Base excess (POC) 12 mmol/L Allens test (POC) YES Total resp. rate 24 Site LEFT RADIAL Specimen type (POC) ARTERIAL Performed by Cris Seymour   
 
 
Radiologic Studies -  
XR CHEST PORT Final Result IMPRESSION:  
  
Interval decrease in probable small pleural effusions vs. resolution. Interval  
resolution of the bibasal infiltrates or atypical presentation of pulmonary  
edema. Stable findings of pulmonary arterial hypertension with likely superimposed  
acute pulmonary venous engorgement. CTA CHEST W OR W WO CONT    (Results Pending) CT Results  (Last 48 hours) None CXR Results  (Last 48 hours) 05/16/20 1219  XR CHEST PORT Final result Impression:  IMPRESSION:  
   
Interval decrease in probable small pleural effusions vs. resolution. Interval  
resolution of the bibasal infiltrates or atypical presentation of pulmonary  
edema. Stable findings of pulmonary arterial hypertension with likely superimposed  
acute pulmonary venous engorgement. Narrative:  EXAM: PORTABLE CHEST 1157 hours CLINICAL HISTORY/INDICATION: meets SIRS criteria , systemic inflammatory  
response syndrome, history of diastolic heart failure, pulmonary hypertension,  
 emphysema, and hypertension, episodes of prior acute respiratory failure, in  
February 2020 this patient had acute sigmoid colon perforation COMPARISON: Previous chest x-ray from March 2020, PACS down. TECHNIQUE: Single AP view FINDINGS:   
   
The heart is top normal in size to mildly enlarged. The main pulmonary arteries  
are prominent. There is cephalization of pulmonary vascular flow. Mildly  
prominent interstitial markings are seen throughout the lungs with less  
involvement in the right upper lung similar to previous exam. There is no  
pneumothorax. Pastora Baxter Medical Decision Making I am the first provider for this patient. I reviewed the vital signs, available nursing notes, past medical history, past surgical history, family history and social history. Vital Signs-Reviewed the patient's vital signs. Records Reviewed: Nursing Notes, Old Medical Records, Previous Radiology Studies and Previous Laboratory Studies Procedures: 
Right external jugular IV placement Date/Time: 5/16/2020 6:33 PM 
Performed by: ANDRE Stockton Authorized by: ANDRE Stockton Consent:  
  Consent given by:  Patient Risks discussed:  Pain Alternatives discussed:  Alternative treatment Indications:  
  Indications:  No peripheral IV access or blood Pre-procedure details:  
  Skin preparation:  ChloraPrep Anesthesia (see MAR for exact dosages): Anesthesia method:  None Post-procedure details:  
  Patient tolerance of procedure: Tolerated well, no immediate complications Provider Notes (Medical Decision Making): Patient given Levaquin and vancomycin here; given dose of Benadryl with the Levaquin. She has a fever she has a white count. Her initial blood pressure was in the 891K but her systolic dropped down to the 70s. She was given some IV fluids and has responded well. Her current systolic blood pressure is 99.   She has no PE. The CTA was ordered because patient chest x-ray showed no definite infiltrate and onset of symptoms seems sudden per her history. CTA showed some emphysema with endobronchial debris but left greater than right consolidations versus pneumonia. She does also have some pulmonary arterial hypertension. She was given 2 L of IV fluid 1 when her blood pressure dropped and 1 upon arrival.  Her antibiotics were discussed with the pulmonologist Dr. Renetta Elizabeth and she wants to add on Flagyl for possible aspiration. Additionally she like to try the patient off of the nonrebreather and place her on the Vapotherm nasal cannula. Patient's respiration rate on the nonrebreather has improved significantly with an initial respiration rate in the 50s and now in the 20s. Have admitted to stepdown to the hospitalist service. MED RECONCILIATION: 
Current Facility-Administered Medications Medication Dose Route Frequency  sodium chloride (NS) flush 5-10 mL  5-10 mL IntraVENous PRN  
 levoFLOXacin (LEVAQUIN) 750 mg in D5W IVPB  750 mg IntraVENous Q24H  
 diphenhydrAMINE (BENADRYL) injection 25 mg  25 mg IntraVENous ONCE  
 naloxone (NARCAN) injection 2 mg  2 mg IntraVENous NOW  
 [START ON 5/17/2020] vancomycin (VANCOCIN) 1,000 mg in 0.9% sodium chloride (MBP/ADV) 250 mL adv  1,000 mg IntraVENous Q12H  
 metroNIDAZOLE (FLAGYL) IVPB premix 500 mg  500 mg IntraVENous NOW  magnesium sulfate 1 g/100 ml IVPB (premix or compounded)  1 g IntraVENous Q1H Current Outpatient Medications Medication Sig  
 umeclidinium-vilanteroL (Anoro Ellipta) 62.5-25 mcg/actuation inhaler INHALE 1 PUFF DAILY  albuterol (ProAir HFA) 90 mcg/actuation inhaler INHALE 2 PUFFS EVERY 4 HOURS AS NEEDED FOR WHEEZING  furosemide (LASIX) 20 mg tablet Take 1 Tab by mouth daily.  apixaban (ELIQUIS) 5 mg tablet Take 1 Tab by mouth two (2) times a day.  amiodarone (CORDARONE) 200 mg tablet Take 1 Tab by mouth daily.  amLODIPine (NORVASC) 5 mg tablet Take 1 Tab by mouth daily.  spironolactone (ALDACTONE) 25 mg tablet Take 1 Tab by mouth daily.  predniSONE (DELTASONE) 10 mg tablet Take 30 mg by mouth daily (with breakfast).  white petrolatum (Protective Ointment) topical ointment Apply 1 Applicator to affected area two (2) times a week.  insulin glargine (LANTUS) 100 unit/mL injection 10 Units by SubCUTAneous route daily.  pantoprazole (PROTONIX) 40 mg tablet Take 1 Tab by mouth Before breakfast and dinner.  therapeutic multivitamin (THERAGRAN) tablet Take 1 Tab by mouth daily.  magnesium oxide (MAG-OX) 400 mg tablet Take 1 Tab by mouth daily.  roflumilast (DALIRESP) 250 mcg tab Take 250 mcg by mouth daily.  tolterodine (DETROL) 1 mg tablet Take 1 Tab by mouth.  traMADol (ULTRAM) 50 mg tablet Take 1 Tab by mouth.  busPIRone (BUSPAR) 15 mg tablet Take 15 mg by mouth two (2) times a day. Indications: repeated episodes of anxiety  OXYGEN-AIR DELIVERY SYSTEMS 3 L/min by Nasal route continuous. [employer] O2  
 albuterol-ipratropium (DUO-NEB) 2.5 mg-0.5 mg/3 ml nebu 3 mL by Nebulization route every six (6) hours as needed for Wheezing.  cholecalciferol (VITAMIN D3) (1000 Units /25 mcg) tablet Take 1,000 Units by mouth daily.  levothyroxine (SYNTHROID) 100 mcg tablet Take 100 mcg by mouth Daily (before breakfast).  aspirin 81 mg chewable tablet Take 1 Tab by mouth daily (with breakfast). Indications: stroke prevention  ascorbic acid, vitamin C, (VITAMIN C) 250 mg tablet Take 1 Tab by mouth daily (with breakfast).  calcium citrate 200 mg (950 mg) tablet Take 1 Tab by mouth two (2) times a day. Indications: post-menopausal osteoporosis prevention  ferrous sulfate 325 mg (65 mg iron) tablet Take 1 Tab by mouth daily (with breakfast).  acetaminophen (TYLENOL) 325 mg tablet Take 2 Tabs by mouth every four (4) hours as needed for Pain. Indications: pain  brief disposable (ADULT) misc by Does Not Apply route.  polyethylene glycol (MIRALAX) 17 gram/dose powder Take 17 g by mouth daily. 1 tablespoon with 8 oz of water daily  BD INSULIN SYRINGE ULTRA-FINE 1 mL 31 gauge x 5/16 syrg  rosuvastatin (CRESTOR) 5 mg tablet TAKE ONE TABLET NIGHTLY (Patient taking differently: Take 5 mg by mouth nightly. TAKE ONE TABLET NIGHTLY)  insulin lispro (HUMALOG) 100 unit/mL injection To be given 15 min before meals: < 150 - None, 151-200 - 2 u, 201-250 - 4 u, 251-300 - 6 u, 301-350 - 8 u, 351-400 - 10 u, >400 - 12 u (Patient taking differently: by SubCUTAneous route three (3) times daily as needed for Other (subcutaneous three times daily as needed before meals per sliding scale. ). To be given 15 min before meals: < 150 - None, 151-200 - 2 u, 201-250 - 4 u, 251-300 - 6 u, 301-350 - 8 u, 351-400 - 10 u, >400 - 12 u)  sertraline (ZOLOFT) 50 mg tablet Take 50 mg by mouth daily. Indications: Bipolar Depression Disposition: 
admit Follow-up Information None Current Discharge Medication List  
  
 
 
 
Core Measures: 
 
Critical Care Time:  
Critical Care Time:  
I have spent 45 minutes of critical care time involved in lab review, consultations with specialist, family decision-making, and documentation. During this entire length of time I was immediately available to the patient. 
  
Critical Care: The reason for providing this level of medical care for this critically ill patient was due a critical illness that impaired one or more vital organ systems such that there was a high probability of imminent or life threatening deterioration in the patients condition. This care involved high complexity decision making to assess, manipulate, and support vital system functions, to treat this degreee vital organ system failure and to prevent further life threatening deterioration of the patients condition. Diagnosis Clinical Impression: 1. Respiratory distress 2. Hypoxia 3. HCAP (healthcare-associated pneumonia) 4. COPD exacerbation ([facility] 5. Hypokalemia 6. Hypomagnesemia

## 2020-05-16 NOTE — H&P
History & Physical 
 
Patient: Kwame Kim MRN: [id number]  CSN: [id number] YOB: 1955  Age: 62 y.o. Sex: female DOA: 5/16/2020 CC: hypoxic respiratory failure PCP: Harvey Juarez NP 
    
HPI:  
 
Kwame Kim is a 62 y.o. female w/ PMH of polysubstance abuse, spinal surgery, recent admission for septic shock from perforated viscous, COPD, sarcoidosis, chronic hypoxia 3L NC, and CHF presenting to the [facility] for c/o trouble breathing. Patient says that she is not short of breath and is just having \"trouble\". She is normally on 3L NC at home and saturations are around 96%. PTA they were in the 50's on 3L. She also was recently started on methadone for her drug abuse. Patient admits to \"snorting dope\" 2-3 days ago. She denies any associated symptoms of fevers, chills, cough, sore throat, abdominal pain, n/v/d, dysuria. Recent admission 2/29-3/22 for sigmoid colon perforation where she underwent ex lap for peritonitis with sigmoid colectomy and drainage of abscesses/stool. Required mechanical ventilation for hypoxic resp failure and slow to wean from vent postoperatively. Past Medical History:  
Diagnosis Date  Abnormally low high density lipoprotein (HDL) cholesterol with hypertriglyceridemia Lipid profile (11/6/2016) showed , , HDL 38, LDL 37  Acute blood loss as cause of postoperative anemia 12/12/2019  Anxiety  Bipolar affective disorder ([address] 12/5/2012  Cellulitis of right forearm 05/04/2017  Chronic back pain  Chronic obstructive pulmonary disease (COPD) ([address] SOB, on [place] O2 Recent admission with psychosis  Chronic respiratory failure with hypoxia ([address] On home oxygen inhalation at 3 LPM via NC  
 Contusion of left elbow 5/4/2017  Depression  Diabetic neuropathy associated with type 2 diabetes mellitus ([address]  Diastolic dysfunction without heart failure Stable on diuretics  Falls frequently  Gastroesophageal reflux disease with hiatal hernia  Generalized osteoarthritis of multiple sites  Gout On Allopurinol  History of acute renal failure 2013  History of back injury   
 jumped out of second story window  History of fracture due to fall 2019  
 History of hepatitis C   
 treated  History of penicillin allergy  History of vitamin D deficiency 5/10/2017  Hypertensive heart disease without heart failure Better controlled  Hyperuricemia 2017  Hypothyroidism  Hypoxemia requiring supplemental oxygen 2014  Intravenous drug user 2017  Long term current use of aspirin  Memory difficulty  Menopause  Mixed connective tissue disease ([facility] 5/10/2017  Obesity, Class I, BMI 30-34.9  Olecranon bursitis of right elbow 2017  Opioid dependence ([facility] On Methadone  Recurrent genital herpes 2013  Right Achilles tendinitis 2017  Sarcoidosis  Sickle cell trait ([facility]  Tobacco use disorder  Type 2 diabetes mellitus with diabetic neuropathy, with long-term current use of insulin ([facility] HbA1c (2019) = 7.1  Urge urinary incontinence 5/10/2017  Venous insufficiency  Wears glasses Past Surgical History:  
Procedure Laterality Date [facility]  HX  SECTION    
 HX CYST REMOVAL Left 1979 Left foot  HX OTHER SURGICAL Left 2017 S/P incision and drainage of the abscess of the left hand and the left foot (2017 - Dr. Graceann Dubin. Meet [facility]man)  HX THORACIC LAMINECTOMY  2019 S/P T10, T9, T8 laminectomy; T7, T8, T9, T10, T11, T12 posterior thoracic fusion; segmental spinal instrumentation; K2M Davis type, T7-T8, T11-T12 (2019 - Dr. Antonio Castillo)  HX TUBAL LIGATION Family History Problem Relation Age of Onset  Seizures Other  Diabetes Mother  Hypertension Mother  Heart Disease Mother  Cancer Mother  Diabetes Father  Stroke Father  Heart Disease Father  Headache Sister  Depression Neg Hx  Suicide Neg Hx  Psychotic Disorder Neg Hx  Substance Abuse Neg Hx  Dementia Neg Hx Social History Socioeconomic History  Marital status: SINGLE Spouse name: Not on file  Number of children: Not on file  Years of education: Not on file  Highest education level: Not on file Occupational History  Occupation: Not employed Employer: NOT EMPLOYED Tobacco Use  Smoking status: Current Every Day Smoker Packs/day: 1.00 Years: 30.00 Pack years: 30.00 Types: Cigarettes Start date: 12/2/1969  Smokeless tobacco: Never Used Substance and Sexual Activity  Alcohol use: No  
 Drug use: No  
  Types: Cocaine, Heroin Comment: +Cocaine Screen 2013 Social History Narrative Lives with 15year old son. Prior to Admission medications Medication Sig Start Date End Date Taking? Authorizing Provider  
umeclidinium-vilanteroL (Anoro Ellipta) 62.5-25 mcg/actuation inhaler INHALE 1 PUFF DAILY 5/3/20  Yes Juliano Dumont MD  
albuterol (ProAir HFA) 90 mcg/actuation inhaler INHALE 2 PUFFS EVERY 4 HOURS AS NEEDED FOR WHEEZING 4/27/20  Yes Juliano Dumont MD  
furosemide (LASIX) 20 mg tablet Take 1 Tab by mouth daily. 4/22/20  Yes Suma Parsons NP  
apixaban (ELIQUIS) 5 mg tablet Take 1 Tab by mouth two (2) times a day. 4/21/20  Yes Suma Parsons NP  
amLODIPine (NORVASC) 5 mg tablet Take 1 Tab by mouth daily. 4/8/20  Yes Bubba Glaser MD  
spironolactone (ALDACTONE) 25 mg tablet Take 1 Tab by mouth daily. 4/8/20  Yes Bubba Glaser MD  
predniSONE (DELTASONE) 10 mg tablet Take 30 mg by mouth daily (with breakfast). 4/3/20  Yes Juliano Dumont MD  
white petrolatum (Protective Ointment) topical ointment Apply 1 Applicator to affected area two (2) times a week.    Yes Provider, Historical  
 insulin glargine (LANTUS) 100 unit/mL injection 10 Units by SubCUTAneous route daily. 3/22/20  Yes Bambi Mason MD  
pantoprazole (PROTONIX) 40 mg tablet Take 1 Tab by mouth Before breakfast and dinner. 3/22/20  Yes Bambi Mason MD  
therapeutic multivitamin [facility]) tablet Take 1 Tab by mouth daily. 3/22/20  Yes Bmabi Mason MD  
magnesium oxide (MAG-OX) 400 mg tablet Take 1 Tab by mouth daily. 3/22/20  Yes Bambi Mason MD  
roflumilast (DALIRESP) 250 mcg tab Take 250 mcg by mouth daily. 2/28/20  Yes Gilma Shay MD  
tolterodine (DETROL) 1 mg tablet Take 1 Tab by mouth. Yes Provider, Historical  
traMADol (ULTRAM) 50 mg tablet Take 1 Tab by mouth. Yes Provider, Historical  
busPIRone (BUSPAR) 15 mg tablet Take 15 mg by mouth two (2) times a day. Indications: repeated episodes of anxiety 2/13/20  Yes Ca Dexter NP  
OXYGEN-AIR DELIVERY SYSTEMS 3 L/min by Nasal route continuous. [facility] [employer] O2   Yes Provider, Historical  
albuterol-ipratropium (DUO-NEB) 2.5 mg-0.5 mg/3 ml nebu 3 mL by Nebulization route every six (6) hours as needed for Wheezing. 1/22/20  Yes Jody Nava MD  
cholecalciferol (VITAMIN D3) (1000 Units /25 mcg) tablet Take 1,000 Units by mouth daily. Yes Provider, Historical  
levothyroxine (SYNTHROID) 100 mcg tablet Take 100 mcg by mouth Daily (before breakfast). Yes Provider, Historical  
aspirin 81 mg chewable tablet Take 1 Tab by mouth daily (with breakfast). Indications: stroke prevention 1/13/20  Yes Michelle Abbott MD  
ascorbic acid, vitamin C, (VITAMIN C) 250 mg tablet Take 1 Tab by mouth daily (with breakfast). 1/14/20  Yes Michelle Abbott MD  
calcium citrate 200 mg (950 mg) tablet Take 1 Tab by mouth two (2) times a day. Indications: post-menopausal osteoporosis prevention 1/14/20  Yes Michelle Abbott MD  
ferrous sulfate 325 mg (65 mg iron) tablet Take 1 Tab by mouth daily (with breakfast).  1/14/20  Yes Michelle Abbott MD  
 acetaminophen (TYLENOL) 325 mg tablet Take 2 Tabs by mouth every four (4) hours as needed for Pain. Indications: pain 1/13/20  Yes Arabella Oakley MD  
brief disposable (ADULT) misc by Does Not Apply route. 1/9/20  Yes Arabella Oakley MD  
polyethylene glycol [facility]) 17 gram/dose powder Take 17 g by mouth daily. 1 tablespoon with 8 oz of water daily 11/29/19  Yes Arben April J, [place]  
BD INSULIN SYRINGE ULTRA-FINE 1 mL 31 gauge x 5/16 syrg  11/5/19  Yes Provider, Historical  
rosuvastatin (CRESTOR) 5 mg tablet TAKE ONE TABLET NIGHTLY Patient taking differently: Take 5 mg by mouth nightly. TAKE ONE TABLET NIGHTLY 2/21/19  Yes Godwin Flores MD  
insulin lispro (HUMALOG) 100 unit/mL injection To be given 15 min before meals: < 150 - None, 151-200 - 2 u, 201-250 - 4 u, 251-300 - 6 u, 301-350 - 8 u, 351-400 - 10 u, >400 - 12 u Patient taking differently: by SubCUTAneous route three (3) times daily as needed for Other (subcutaneous three times daily as needed before meals per sliding scale. ). To be given 15 min before meals: < 150 - None, 151-200 - 2 u, 201-250 - 4 u, 251-300 - 6 u, 301-350 - 8 u, 351-400 - 10 u, >400 - 12 u 5/31/17  Yes Arabella Oakley MD  
sertraline (ZOLOFT) 50 mg tablet Take 50 mg by mouth daily. Indications: Bipolar Depression   Yes Provider, Historical  
amiodarone (CORDARONE) 200 mg tablet Take 1 Tab by mouth daily. 4/8/20   Godwin Flores MD  
 
 
Allergies Allergen Reactions  Moxifloxacin Rash  Pcn [Penicillins] Swelling  Sulfa (Sulfonamide Antibiotics) Swelling Physical Exam:  
  
Visit Vitals BP (!) 84/55 Pulse 64 Temp 100 °F (37.8 °C) Resp 20 Ht 5' 2\" (1.575 m) Wt 74.8 kg (165 lb) SpO2 93% BMI 30.18 kg/m² Physical Exam: 
Physical Exam 
Constitutional:   
   Appearance: She is ill-appearing. HENT:  
   Head: Normocephalic and atraumatic.   
   Right Ear: External ear normal.  
   Left Ear: External ear normal.  
 Nose: Nose normal.  
   Mouth/Throat:  
   Mouth: Mucous membranes are moist.  
Eyes:  
   Pupils: Pupils are equal, round, and reactive to light. Neck: Musculoskeletal: Neck supple. Cardiovascular:  
   Rate and Rhythm: Normal rate and regular rhythm. Heart sounds: Normal heart sounds. Pulmonary:  
   Effort: Pulmonary effort is normal. No respiratory distress. Breath sounds: Rhonchi present. Comments: Rhonchi diffusely. Decreased air movement in bases. Abdominal:  
   General: Abdomen is flat. Bowel sounds are normal.  
   Tenderness: There is no abdominal tenderness. Comments: Colostomy present in LLQ. Brown stool present. Midline incision  with dressing over top. Musculoskeletal:  
   Right lower leg: No edema. Left lower leg: No edema. Skin: 
   General: Skin is warm and dry. Neurological:  
   General: No focal deficit present. Mental Status: She is alert and oriented to person, place, and time. Psychiatric:     
   Behavior: Behavior normal.  
 
 
 
 
Lab/Data Review: 
Labs: Results:  
   
Chemistry Recent Labs 05/16/20 
1100 * * K 3.2*  
CL 93* CO2 36* BUN 16  
CREA 1.00  
CA 7.9* AGAP 6  
BUCR 16  
AP 81  
TP 7.8 ALB 3.1*  
GLOB 4.7* AGRAT 0.7* CBC w/Diff Recent Labs 05/16/20 
1100 WBC 18.6*  
RBC 5.21  
HGB 13.4 HCT 43.7  GRANS 76* LYMPH 21 EOS 0 Coagulation Recent Labs 05/16/20 
1100 PTP 16.9* INR 1.4* Iron/Ferritin No results for input(s): IRON in the last 72 hours. No lab exists for component: TIBCCALC BNP No results for input(s): BNPP in the last 72 hours. Cardiac Enzymes No results for input(s): CPK, CKND1, SANTOS in the last 72 hours. No lab exists for component: Daniel Blackman Liver Enzymes Recent Labs 05/16/20 
1100 TP 7.8 ALB 3.1* AP 81 SGOT 48* Thyroid Studies Lab Results Component Value Date/Time  TSH 5.11 (H) 03/05/2020 06:25 PM  
    
 All Micro Results Procedure Component Value Units Date/Time Oneta Section [[id number]] Collected:  05/16/20 1837 Order Status:  Completed Specimen:  Cath Urine Updated:  05/16/20 1930 RESPIRATORY PANEL,PCR,NASOPHARYNGEAL [[id number]] Order Status:  Sent Specimen:  NASOPHARYNGEAL SWAB CULTURE, BLOOD [[id number]] Collected:  05/16/20 1140 Order Status:  Completed Specimen:  Blood Updated:  05/16/20 1300 CULTURE, BLOOD [[id number]] Collected:  05/16/20 1100 Order Status:  Completed Specimen:  Blood Updated:  05/16/20 1116 Imaging Reviewed: CXR Results  (Last 48 hours) 05/16/20 1219  XR CHEST PORT Final result Impression:  IMPRESSION:  
   
Interval decrease in probable small pleural effusions vs. resolution. Interval  
resolution of the bibasal infiltrates or atypical presentation of pulmonary  
edema. Stable findings of pulmonary arterial hypertension with likely superimposed  
acute pulmonary venous engorgement. Narrative:  EXAM: PORTABLE CHEST 1157 hours CLINICAL HISTORY/INDICATION: meets SIRS criteria , systemic inflammatory  
response syndrome, history of diastolic heart failure, pulmonary hypertension,  
emphysema, and hypertension, episodes of prior acute respiratory failure, in  
February 2020 this patient had acute sigmoid colon perforation COMPARISON: Previous chest x-ray from March 2020, PACS down. TECHNIQUE: Single AP view FINDINGS:   
   
The heart is top normal in size to mildly enlarged. The main pulmonary arteries  
are prominent. There is cephalization of pulmonary vascular flow. Mildly  
prominent interstitial markings are seen throughout the lungs with less  
involvement in the right upper lung similar to previous exam. There is no  
pneumothorax. Yeimi Dye Assessment:  
Active Problems: 
  Sarcoidosis () Sepsis ([facility] (1/17/2020) PAF (paroxysmal atrial fibrillation) ([facility] [address] (4/8/2020) Overview: Noted in hospital.  She was started on amiodarone and Eliquis continue  
    current treatment stable Hypokalemia (5/16/2020) Hypomagnesemia (5/16/2020) Respiratory distress (5/16/2020) Acute on chronic respiratory failure with hypoxemia ([address] (5/16/2020) COPD exacerbation ([address] (5/16/2020) Polysubstance abuse ([address] (5/16/2020) Pulmonary hypertension, moderate to severe ([address] (5/16/2020) Elevated bilirubin (5/16/2020) Plan: 1. Acute on chronic hypoxic resp failure, on 3L PTA · vapotherm for flow [id number] 90-96% · Albuterol MDI 2. Sepsis, possibly septic shock · Levaquin, flagyl, vanc · Suspect possible UTI vs aspiration pneumonia · Solucortef stress dose · F/u CX's 3. COPD, sarcoidosis · Albuterol MDI · Home nebs reordered. 4. PAF 
· Cont' eliquis, amio 5. Hypokalemia, hypomag · Replacement ordered 6. Elevated bili, AST · May be due to hypovolemia, RUQ if does not resolve tomorrow 7. Polysubstance abuse -2ppd tobacco, opiates, ETOH, methadone · Monitor for s/o withdrawal  
· UDS 8. Best Practice ? AC- eliquis ? SUP-protonix home dose ? Home medications Full Code Devon Bernstein PA-C 
5/16/2020, 6:41 PM

## 2020-05-17 NOTE — CONSULTS
[employer] [facility] Pulmonary, Critical Care, and Sleep Medicine Name: Alek Harper MRN: [id number] : 1955 Hospital: [facility] Date: 2020 Pulmonary Medicine -Initial Patient Consult IMPRESSION:  
· Respiratory Failure- acute on chronic-hypoxic and hypercapnic- On 3LPM at home at baseline · Possible sepsis- Pneumonia: CAP, Aspiration, HAP, pneumonitis and/or COVID-19- CT chest with Bilateral Left> Right basilar consolidations. · COPD with possible component of acute exacerbation- No recent Fev1 · Transaminitis · Sarcoidosis- chronic · Hypotension: Upon admission - responded to IVF- etiology could be due to: dehydration, sepsis and/or adrenal insufficiency- On and off steroids · Pulmonary Hypertension- chronic · Heroine use- acute on chronic with signs of withdrawal today- endorses last snorting Heroin on evening of 5/15/2020. Intermittently on methadone · PAFIB- sinus on telemetry · Colostomy- left side. S/P Ex-Lap: 20- perforated bowel, sigmoid colectomy with drainage of multiple stool and abscess collections · Active tobacco smoker · DM 
· Hypothyroidism- On synthroid and Amiodarone- TSH normal 20 · HLD · Chronic pain- limited ROMS- ambulates via wheelchair, Thoracic level rods present, multiple compression fractures- most recently noted T6. 
· Anemia- Sickle Cell trait with iron deficiency- no signs of acute bleeding at this time · Splenic nodules- stable per recent CT 
· Obesity · H/O S. Epi bacteremia- early march- sensitive to Vanco 
· H/O E. Coli URI: - Levaquin Resistant · Poor Dentition RECOMMENDATIONS:  
· Keep HOB elevated; Maintain SpO2 90-94% · Pulmonary hygiene with IS at bedside · Monitor for signs of withdrawal 
· Continue Eliquis- high risk for VTE in the clinical setting and comorbidities · Nicotine Patch · Follow up on pending cultures and COVID R/O 
 · Agree with Kehinde Vidal- can d/c flagy and add Zithromax for Atypical coverage · Agree with stopping Levaquin · Trend LAEs · Monitor QTc: 427 ms yesterday · Trend COVID inflammatory markers · Continue Daliresp · Continue with Bevespi (LAMA-LABA) 2 puffs BID- monitor heart rate · Add Pulmicort Respirmat- Hold of systemic steroids for now · Add Spacer for MDIs · Albuterol 2 puffs Q 4 hours- monitor heart rate · Continue protonix · Continue stress dose steroids · Glycemic control and electrolyte management per primary team 
· Colostomy care per nursing · Will continue to follow. Due to complexity of chronic medical conditions- patient is at high risk for clinical decompensation. · Further recommendations and therapies pending clinical course and results of pending studies Subjective/History: This patient has been seen and evaluated at the request of Dr. Sayda Warren for Acute on Chronic Hypoxic Respiratory Failure. Patient is a 62 y.o. female who presented to [facility] ED for increasing respiratory distress, tachypnea  and hypotension. Patient has a complex medical history to include: COPD, pulmonary hypertension, hypoxic respiratory failure, tobacco smoker, substance abuse, chronic pain and most recently a hospitalization in Feb/march for perforated bowel requiring sigmoid colectomy and left sided colostomy. At time of my evaluation today, the patient was in ED-bed 20. She is mildly diaphoretic and is shaking her legs which she states is characteristic for her Opioid withdrawal symptoms. She reports snoring heroin last Friday night and she has also been taking methadone. She continues to smoker cigarettes. She reports that about 5 days ago she had 2 days of coughing up dark- brown sputum. Sputum per her report today is now clearer. She denies hemoptysis. No known bouts of aspiration or chocking. Her Colostomy is full of firm brown stool- no reports of blood from ostomy.  She reports proper care of her ostomy. She reports limited ROM and can stand with some assistance. She has a non- motorized wheelchair which she has been using for mobility Past Medical History:  
Diagnosis Date  Abnormally low high density lipoprotein (HDL) cholesterol with hypertriglyceridemia Lipid profile (11/6/2016) showed , , HDL 38, LDL 37  Acute blood loss as cause of postoperative anemia 12/12/2019  Anxiety  Bipolar affective disorder ([address] 12/5/2012  Cellulitis of right forearm 05/04/2017  Chronic back pain  Chronic obstructive pulmonary disease (COPD) ([address] SOB, on [place] O2 Recent admission with psychosis  Chronic respiratory failure with hypoxia ([address] On home oxygen inhalation at 3 LPM via NC  
 Contusion of left elbow 5/4/2017  Depression  Diabetic neuropathy associated with type 2 diabetes mellitus ([address]  Diastolic dysfunction without heart failure Stable on diuretics  Falls frequently  Gastroesophageal reflux disease with hiatal hernia  Generalized osteoarthritis of multiple sites  Gout On Allopurinol  History of acute renal failure 5/31/2013  History of back injury   
 jumped out of second story window  History of fracture due to fall 12/11/2019  
 History of hepatitis C   
 treated  History of penicillin allergy  History of vitamin D deficiency 5/10/2017  Hypertensive heart disease without heart failure Better controlled  Hyperuricemia 5/26/2017  Hypothyroidism  Hypoxemia requiring supplemental oxygen 12/29/2014  Intravenous drug user 5/2/2017  Long term current use of aspirin  Memory difficulty  Menopause  Mixed connective tissue disease ([address] 5/10/2017  Obesity, Class I, BMI 30-34.9  Olecranon bursitis of right elbow 5/4/2017  Opioid dependence ([address] On Methadone  Recurrent genital herpes 5/31/2013  Right Achilles tendinitis 5/2/2017  Sarcoidosis  Sickle cell trait ([facility]  Tobacco use disorder  Type 2 diabetes mellitus with diabetic neuropathy, with long-term current use of insulin ([facility] HbA1c (2019) = 7.1  Urge urinary incontinence 5/10/2017  Venous insufficiency  Wears glasses Past Surgical History:  
Procedure Laterality Date Toño  HX  SECTION    
 HX CYST REMOVAL Left 1979 Left foot  HX OTHER SURGICAL Left 2017 S/P incision and drainage of the abscess of the left hand and the left foot (2017 - Dr. Ronny Arshad. Ronal Ardon)  HX THORACIC LAMINECTOMY  2019 S/P T10, T9, T8 laminectomy; T7, T8, T9, T10, T11, T12 posterior thoracic fusion; segmental spinal instrumentation; K2M Ama type, T7-T8, T11-T12 (2019 - Dr. Stefan Thrasher)  HX TUBAL LIGATION Prior to Admission medications Medication Sig Start Date End Date Taking? Authorizing Provider  
umeclidinium-vilanteroL (Anoro Ellipta) 62.5-25 mcg/actuation inhaler INHALE 1 PUFF DAILY 5/3/20  Yes Deisy Syed MD  
albuterol (ProAir HFA) 90 mcg/actuation inhaler INHALE 2 PUFFS EVERY 4 HOURS AS NEEDED FOR WHEEZING 20  Yes Deisy Syed MD  
furosemide (LASIX) 20 mg tablet Take 1 Tab by mouth daily. 20  Yes Suma Parsons NP  
apixaban (ELIQUIS) 5 mg tablet Take 1 Tab by mouth two (2) times a day. 20  Yes Suma Parsons NP  
amLODIPine (NORVASC) 5 mg tablet Take 1 Tab by mouth daily. 20  Yes Mell Ortega MD  
spironolactone (ALDACTONE) 25 mg tablet Take 1 Tab by mouth daily. 20  Yes Mell Ortega MD  
predniSONE (DELTASONE) 10 mg tablet Take 30 mg by mouth daily (with breakfast). 4/3/20  Yes Deisy Syed MD  
white petrolatum (Protective Ointment) topical ointment Apply 1 Applicator to affected area two (2) times a week.    Yes Provider, Historical  
insulin glargine (LANTUS) 100 unit/mL injection 10 Units by SubCUTAneous route daily. 3/22/20  Yes Damari Tellez MD  
pantoprazole (PROTONIX) 40 mg tablet Take 1 Tab by mouth Before breakfast and dinner. 3/22/20  Yes Damari Tellez MD  
therapeutic multivitamin [facility]) tablet Take 1 Tab by mouth daily. 3/22/20  Yes Damari Tellez MD  
magnesium oxide (MAG-OX) 400 mg tablet Take 1 Tab by mouth daily. 3/22/20  Yes Damari Tellez MD  
roflumilast (DALIRESP) 250 mcg tab Take 250 mcg by mouth daily. 2/28/20  Yes Clara Álvarez MD  
tolterodine (DETROL) 1 mg tablet Take 1 Tab by mouth. Yes Provider, Historical  
traMADol (ULTRAM) 50 mg tablet Take 1 Tab by mouth. Yes Provider, Historical  
busPIRone (BUSPAR) 15 mg tablet Take 15 mg by mouth two (2) times a day. Indications: repeated episodes of anxiety 2/13/20  Yes Ca Dexter NP  
OXYGEN-AIR DELIVERY SYSTEMS 3 L/min by Nasal route continuous. [employer] O2   Yes Provider, Historical  
albuterol-ipratropium (DUO-NEB) 2.5 mg-0.5 mg/3 ml nebu 3 mL by Nebulization route every six (6) hours as needed for Wheezing. 1/22/20  Yes Ashley Howard MD  
cholecalciferol (VITAMIN D3) (1000 Units /25 mcg) tablet Take 1,000 Units by mouth daily. Yes Provider, Historical  
levothyroxine (SYNTHROID) 100 mcg tablet Take 100 mcg by mouth Daily (before breakfast). Yes Provider, Historical  
aspirin 81 mg chewable tablet Take 1 Tab by mouth daily (with breakfast). Indications: stroke prevention 1/13/20  Yes José Francis MD  
ascorbic acid, vitamin C, (VITAMIN C) 250 mg tablet Take 1 Tab by mouth daily (with breakfast). 1/14/20  Yes José Francis MD  
calcium citrate 200 mg (950 mg) tablet Take 1 Tab by mouth two (2) times a day. Indications: post-menopausal osteoporosis prevention 1/14/20  Yes José Francis MD  
ferrous sulfate 325 mg (65 mg iron) tablet Take 1 Tab by mouth daily (with breakfast).  1/14/20  Yes José Francis MD  
acetaminophen (TYLENOL) 325 mg tablet Take 2 Tabs by mouth every four (4) hours as needed for Pain. Indications: pain 1/13/20  Yes Cherylene Falter, MD  
brief disposable (ADULT) misc by Does Not Apply route. 1/9/20  Yes Cherylene Falter, MD  
polyethylene glycol [facility]) 17 gram/dose powder Take 17 g by mouth daily. 1 tablespoon with 8 oz of water daily 11/29/19  Yes Arben Vashon, [place]  
BD INSULIN SYRINGE ULTRA-FINE 1 mL 31 gauge x 5/16 syrg  11/5/19  Yes Provider, Historical  
rosuvastatin (CRESTOR) 5 mg tablet TAKE ONE TABLET NIGHTLY Patient taking differently: Take 5 mg by mouth nightly. TAKE ONE TABLET NIGHTLY 2/21/19  Yes Bereket Cartagena MD  
insulin lispro (HUMALOG) 100 unit/mL injection To be given 15 min before meals: < 150 - None, 151-200 - 2 u, 201-250 - 4 u, 251-300 - 6 u, 301-350 - 8 u, 351-400 - 10 u, >400 - 12 u Patient taking differently: by SubCUTAneous route three (3) times daily as needed for Other (subcutaneous three times daily as needed before meals per sliding scale. ). To be given 15 min before meals: < 150 - None, 151-200 - 2 u, 201-250 - 4 u, 251-300 - 6 u, 301-350 - 8 u, 351-400 - 10 u, >400 - 12 u 5/31/17  Yes Cherylene Falter, MD  
sertraline (ZOLOFT) 50 mg tablet Take 50 mg by mouth daily. Indications: Bipolar Depression   Yes Provider, Historical  
amiodarone (CORDARONE) 200 mg tablet Take 1 Tab by mouth daily. 4/8/20   Bereket Cartagena MD  
 
Current Facility-Administered Medications Medication Dose Route Frequency  levoFLOXacin (LEVAQUIN) 750 mg in D5W IVPB  750 mg IntraVENous Q24H  
 vancomycin (VANCOCIN) 1,000 mg in 0.9% sodium chloride (MBP/ADV) 250 mL adv  1,000 mg IntraVENous Q12H  hydrocortisone Sod Succ (PF) (SOLU-CORTEF) injection 50 mg  50 mg IntraVENous Q6H  
 amiodarone (CORDARONE) tablet 200 mg  200 mg Oral DAILY  apixaban (ELIQUIS) tablet 5 mg  5 mg Oral BID  ascorbic acid (vitamin C) (VITAMIN C) tablet 250 mg  250 mg Oral DAILY WITH BREAKFAST  aspirin chewable tablet 81 mg  81 mg Oral DAILY WITH BREAKFAST  insulin glargine (LANTUS) injection 10 Units  10 Units SubCUTAneous DAILY  levothyroxine (SYNTHROID) tablet 100 mcg  100 mcg Oral ACB  pantoprazole (PROTONIX) tablet 40 mg  40 mg Oral ACB&D  
 roflumilast (DALIRESP) tablet 250 mcg  250 mcg Oral DAILY  sertraline (ZOLOFT) tablet 50 mg  50 mg Oral DAILY  rosuvastatin (CRESTOR) tablet 5 mg  5 mg Oral QHS  glycopyrrolate-formoterol (BEVESPI AEROSPHERE) 9 mcg-4.8 mcg inhaler  2 Puff Inhalation BID  albuterol (PROVENTIL HFA, VENTOLIN HFA, PROAIR HFA) inhaler 1 Puff  1 Puff Inhalation Q4H RT Allergies Allergen Reactions  Moxifloxacin Rash  Pcn [Penicillins] Swelling  Sulfa (Sulfonamide Antibiotics) Swelling Social History Tobacco Use  Smoking status: Current Every Day Smoker Packs/day: 1.00 Years: 30.00 Pack years: 30.00 Types: Cigarettes Start date: 1969  Smokeless tobacco: Never Used Substance Use Topics  Alcohol use: No  
  
Family History Problem Relation Age of Onset  Seizures Other  Diabetes Mother  Hypertension Mother  Heart Disease Mother  Cancer Mother  Diabetes Father  Stroke Father  Heart Disease Father  Headache Sister  Depression Neg Hx  Suicide Neg Hx  Psychotic Disorder Neg Hx  Substance Abuse Neg Hx  Dementia Neg Hx Review of Systems: 
Pertinent items are noted in HPI. Objective:  
Vital Signs:   
Visit Vitals /59 Pulse 84 Temp 98.3 °F (36.8 °C) Resp 30 Ht 5' 2\" (1.575 m) Wt 74.8 kg (165 lb) LMP 2012 (Exact Date) SpO2 97% BMI 30.18 kg/m² O2 Device: Hi flow nasal cannula O2 Flow Rate (L/min): 25 l/min Temp (24hrs), Av.9 °F (37.2 °C), Min:98.3 °F (36.8 °C), Max:100 °F (37.8 °C) Intake/Output:  
Last shift:      No intake/output data recorded. Last 3 shifts: No intake/output data recorded. No intake or output data in the 24 hours ending 20 1053 Ventilator Settings: 
Mode Rate Tidal Volume Pressure FiO2 PEEP  
         45 % Physical Exam: 
 
General:  Alert, cooperative, mild distress, sitting up in bed in ED Head:  Normocephalic, without obvious abnormality, atraumatic. Eyes:  Conjunctivae/corneas clear. PERRL, EOMs intact. Nose: Nares normal. Septum midline. Mucosa normal. No drainage or sinus tenderness. Throat: Lips, mucosa, and tongue normal. - Poor dentition- no oral exudates Neck: Supple, symmetrical, trachea midline, no adenopathy, thyroid: no enlargment/tenderness/nodules, no JVD. Back:   + Kyphosis, limited ROM Lungs:   No Auscultation, with NC in place, No audible wheezing or stridor, mild accessory muscle use Chest wall:  No tenderness - some increase in A-P diameter and skeletal chest wall asymetry. Heart:  Pulse- rapid and regular- No auscultation Abdomen:   Soft, non-tender. No masses,  No organomegaly. Mild line incision- healed- left sided colostomy- full of form stool Extremities: Extremities normal, atraumatic, no cyanosis or edema. - limited ROM with some chronic changes Pulses: 2+ and symmetric all extremities. Skin: Skin color, texture, turgor normal. No rashes or lesions Lymph nodes: 
 
  Cervical, supraclavicular nodes are unremarkable Neurologic: Limited ROM, alert and oriented, deconditioning Data:  
 
 
Lab Results Component Value Date/Time C-Reactive protein 6.8 (H) 05/16/2020 08:45 PM  
 
Lab Results Component Value Date/Time D DIMER 0.40 05/16/2020 08:45 PM  
 
Lab Results Component Value Date/Time Ferritin 77 05/16/2020 08:45 PM  
 
  
 
Recent Results (from the past 24 hour(s)) CULTURE, BLOOD Collection Time: 05/16/20 11:00 AM  
Result Value Ref Range Special Requests: NO SPECIAL REQUESTS Culture result: NO GROWTH AFTER 19 HOURS METABOLIC PANEL, COMPREHENSIVE Collection Time: 05/16/20 11:00 AM  
Result Value Ref Range Sodium 135 (L) 136 - 145 mmol/L Potassium 3.2 (L) 3.5 - 5.5 mmol/L Chloride 93 (L) 100 - 111 mmol/L  
 CO2 36 (H) 21 - 32 mmol/L Anion gap 6 3.0 - 18 mmol/L Glucose 171 (H) 74 - 99 mg/dL BUN 16 7.0 - 18 MG/DL Creatinine 1.00 0.6 - 1.3 MG/DL  
 BUN/Creatinine ratio 16 12 - 20 GFR est AA >60 >60 ml/min/1.73m2 GFR est non-AA 56 (L) >60 ml/min/1.73m2 Calcium 7.9 (L) 8.5 - 10.1 MG/DL Bilirubin, total 1.2 (H) 0.2 - 1.0 MG/DL  
 ALT (SGPT) 29 13 - 56 U/L  
 AST (SGOT) 48 (H) 10 - 38 U/L Alk. phosphatase 81 45 - 117 U/L Protein, total 7.8 6.4 - 8.2 g/dL Albumin 3.1 (L) 3.4 - 5.0 g/dL Globulin 4.7 (H) 2.0 - 4.0 g/dL A-G Ratio 0.7 (L) 0.8 - 1.7    
CBC WITH AUTOMATED DIFF Collection Time: 05/16/20 11:00 AM  
Result Value Ref Range WBC 18.6 (H) 4.6 - 13.2 K/uL  
 RBC 5.21 4.20 - 5.30 M/uL  
 HGB 13.4 12.0 - 16.0 g/dL HCT 43.7 35.0 - 45.0 % MCV 83.9 74.0 - 97.0 FL  
 MCH 25.7 24.0 - 34.0 PG  
 MCHC 30.7 (L) 31.0 - 37.0 g/dL  
 RDW 17.4 (H) 11.6 - 14.5 % PLATELET 529 402 - 200 K/uL MPV 9.2 9.2 - 11.8 FL  
 NEUTROPHILS 76 (H) 42 - 75 % LYMPHOCYTES 21 20 - 51 % MONOCYTES 3 2 - 9 % EOSINOPHILS 0 0 - 5 % BASOPHILS 0 0 - 3 %  
 ABS. NEUTROPHILS 14.1 (H) 1.8 - 8.0 K/UL  
 ABS. LYMPHOCYTES 3.9 (H) 0.8 - 3.5 K/UL  
 ABS. MONOCYTES 0.6 0 - 1.0 K/UL  
 ABS. EOSINOPHILS 0.0 0.0 - 0.4 K/UL  
 ABS. BASOPHILS 0.0 0.0 - 0.06 K/UL  
 DF MANUAL PLATELET COMMENTS ADEQUATE PLATELETS    
 RBC COMMENTS ANISOCYTOSIS 3+ 
    
 RBC COMMENTS POLYCHROMASIA 1+ TROPONIN I Collection Time: 05/16/20 11:00 AM  
Result Value Ref Range Troponin-I, QT 0.04 0.0 - 0.045 NG/ML  
NT-PRO BNP Collection Time: 05/16/20 11:00 AM  
Result Value Ref Range NT pro- 0 - 900 PG/ML  
PROTHROMBIN TIME + INR Collection Time: 05/16/20 11:00 AM  
Result Value Ref Range Prothrombin time 16.9 (H) 11.5 - 15.2 sec INR 1.4 (H) 0.8 - 1.2 ETHYL ALCOHOL Collection Time: 05/16/20 11:00 AM  
Result Value Ref Range ALCOHOL(ETHYL),SERUM <3 0 - 3 MG/DL MAGNESIUM Collection Time: 05/16/20 11:00 AM  
Result Value Ref Range Magnesium 1.6 1.6 - 2.6 mg/dL PHOSPHORUS Collection Time: 05/16/20 11:00 AM  
Result Value Ref Range Phosphorus 5.6 (H) 2.5 - 4.9 MG/DL POC LACTIC ACID Collection Time: 05/16/20 11:08 AM  
Result Value Ref Range Lactic Acid (POC) 1.57 0.40 - 2.00 mmol/L  
EKG, 12 LEAD, INITIAL Collection Time: 05/16/20 11:38 AM  
Result Value Ref Range Ventricular Rate 97 BPM  
 Atrial Rate 97 BPM  
 P-R Interval 112 ms QRS Duration 84 ms Q-T Interval 352 ms QTC Calculation (Bezet) 447 ms Calculated P Axis 68 degrees Calculated R Axis -25 degrees Calculated T Axis 45 degrees Diagnosis Sinus rhythm with occasional atrial-paced complexes Possible Left atrial enlargement Nonspecific ST and T wave abnormality Abnormal ECG When compared with ECG of 10-MAR-2020 12:44, Electronic atrial pacemaker has replaced Sinus rhythm CULTURE, BLOOD Collection Time: 05/16/20 11:40 AM  
Result Value Ref Range Special Requests: NO SPECIAL REQUESTS Culture result: NO GROWTH AFTER 17 HOURS    
POC G3 Collection Time: 05/16/20 11:53 AM  
Result Value Ref Range Device: AEROSOL MASK    
 pH (POC) 7.448 7.35 - 7.45    
 pCO2 (POC) 56.0 (H) 35.0 - 45.0 MMHG  
 pO2 (POC) 56 (L) 80 - 100 MMHG  
 HCO3 (POC) 38.8 (H) 22 - 26 MMOL/L  
 sO2 (POC) 89 (L) 92 - 97 % Base excess (POC) 12 mmol/L Allens test (POC) YES Total resp. rate 24 Site LEFT RADIAL Specimen type (POC) ARTERIAL Performed by Crow Lockett   
URINALYSIS W/ RFLX MICROSCOPIC Collection Time: 05/16/20  6:37 PM  
Result Value Ref Range Color YELLOW Appearance CLEAR Specific gravity 1.027 1.005 - 1.030    
 pH (UA) 5.5 5.0 - 8.0 Protein Negative NEG mg/dL Glucose Negative NEG mg/dL Ketone Negative NEG mg/dL Bilirubin Negative NEG Blood MODERATE (A) NEG Urobilinogen 0.2 0.2 - 1.0 EU/dL Nitrites Negative NEG Leukocyte Esterase LARGE (A) NEG    
DRUG SCREEN, URINE Collection Time: 05/16/20  6:37 PM  
Result Value Ref Range BENZODIAZEPINES Negative NEG    
 BARBITURATES Negative NEG    
 THC (TH-CANNABINOL) Negative NEG    
 OPIATES Positive (A) NEG    
 PCP(PHENCYCLIDINE) Negative NEG    
 COCAINE Negative NEG    
 AMPHETAMINES Negative NEG METHADONE Positive (A) NEG HDSCOM (NOTE) URINE MICROSCOPIC ONLY Collection Time: 05/16/20  6:37 PM  
Result Value Ref Range WBC 11 to 20 0 - 4 /hpf  
 RBC 4 to 10 0 - 5 /hpf Epithelial cells 1+ 0 - 5 /lpf Bacteria 4+ (A) NEG /hpf  
LD Collection Time: 05/16/20  8:45 PM  
Result Value Ref Range  (H) 81 - 234 U/L  
D DIMER Collection Time: 05/16/20  8:45 PM  
Result Value Ref Range D DIMER 0.40 <0.46 ug/ml(FEU) FERRITIN Collection Time: 05/16/20  8:45 PM  
Result Value Ref Range Ferritin 77 8 - 388 NG/ML  
C REACTIVE PROTEIN, QT Collection Time: 05/16/20  8:45 PM  
Result Value Ref Range C-Reactive protein 6.8 (H) 0 - 0.3 mg/dL METABOLIC PANEL, COMPREHENSIVE Collection Time: 05/17/20  5:00 AM  
Result Value Ref Range Sodium 138 136 - 145 mmol/L Potassium 3.4 (L) 3.5 - 5.5 mmol/L Chloride 100 100 - 111 mmol/L  
 CO2 33 (H) 21 - 32 mmol/L Anion gap 5 3.0 - 18 mmol/L Glucose 189 (H) 74 - 99 mg/dL BUN 12 7.0 - 18 MG/DL Creatinine 0.77 0.6 - 1.3 MG/DL  
 BUN/Creatinine ratio 16 12 - 20 GFR est AA >60 >60 ml/min/1.73m2 GFR est non-AA >60 >60 ml/min/1.73m2 Calcium 7.5 (L) 8.5 - 10.1 MG/DL Bilirubin, total 0.8 0.2 - 1.0 MG/DL  
 ALT (SGPT) 19 13 - 56 U/L  
 AST (SGOT) 22 10 - 38 U/L Alk. phosphatase 51 45 - 117 U/L Protein, total 5.9 (L) 6.4 - 8.2 g/dL Albumin 2.6 (L) 3.4 - 5.0 g/dL Globulin 3.3 2.0 - 4.0 g/dL A-G Ratio 0.8 0.8 - 1.7 MAGNESIUM  
 Collection Time: 05/17/20  5:00 AM  
Result Value Ref Range Magnesium 2.5 1.6 - 2.6 mg/dL PHOSPHORUS Collection Time: 05/17/20  5:00 AM  
Result Value Ref Range Phosphorus 3.6 2.5 - 4.9 MG/DL  
CBC WITH AUTOMATED DIFF Collection Time: 05/17/20  5:00 AM  
Result Value Ref Range WBC 11.1 4.6 - 13.2 K/uL  
 RBC 3.59 (L) 4.20 - 5.30 M/uL HGB 9.1 (L) 12.0 - 16.0 g/dL HCT 29.4 (L) 35.0 - 45.0 % MCV 81.9 74.0 - 97.0 FL  
 MCH 25.3 24.0 - 34.0 PG  
 MCHC 31.0 31.0 - 37.0 g/dL  
 RDW 17.2 (H) 11.6 - 14.5 % PLATELET 883 (L) 040 - 420 K/uL MPV 8.9 (L) 9.2 - 11.8 FL  
 NEUTROPHILS 91 (H) 40 - 73 % LYMPHOCYTES 6 (L) 21 - 52 % MONOCYTES 3 3 - 10 % EOSINOPHILS 0 0 - 5 % BASOPHILS 0 0 - 2 %  
 ABS. NEUTROPHILS 10.0 (H) 1.8 - 8.0 K/UL  
 ABS. LYMPHOCYTES 0.7 (L) 0.9 - 3.6 K/UL  
 ABS. MONOCYTES 0.4 0.05 - 1.2 K/UL  
 ABS. EOSINOPHILS 0.0 0.0 - 0.4 K/UL  
 ABS. BASOPHILS 0.0 0.0 - 0.1 K/UL  
 DF AUTOMATED PROTHROMBIN TIME + INR Collection Time: 05/17/20  5:00 AM  
Result Value Ref Range Prothrombin time 17.2 (H) 11.5 - 15.2 sec INR 1.4 (H) 0.8 - 1.2 PTT Collection Time: 05/17/20  5:00 AM  
Result Value Ref Range aPTT 36.8 (H) 23.0 - 36.4 SEC  
LD Collection Time: 05/17/20  5:00 AM  
Result Value Ref Range  81 - 234 U/L  
HGB & HCT Collection Time: 05/17/20  5:33 AM  
Result Value Ref Range HGB 8.7 (L) 12.0 - 16.0 g/dL HCT 28.1 (L) 35.0 - 45.0 % GLUCOSE, POC Collection Time: 05/17/20  8:08 AM  
Result Value Ref Range Glucose (POC) 218 (H) 70 - 110 mg/dL GLUCOSE, POC Collection Time: 05/17/20 10:34 AM  
Result Value Ref Range Glucose (POC) 285 (H) 70 - 110 mg/dL Recent Labs 05/16/20 
1153 HCO3I 38.8* PCO2I 56.0*  
PHI 7.448 PO2I 56* Imaging: 
I have personally reviewed the patients radiographs and have reviewed the reports: CXR Results  (Last 48 hours) 05/16/20 1219  XR CHEST PORT Final result Impression:  IMPRESSION:  
   
Interval decrease in probable small pleural effusions vs. resolution. Interval  
resolution of the bibasal infiltrates or atypical presentation of pulmonary  
edema. Stable findings of pulmonary arterial hypertension with likely superimposed  
acute pulmonary venous engorgement. Narrative:  EXAM: PORTABLE CHEST 1157 hours CLINICAL HISTORY/INDICATION: meets SIRS criteria , systemic inflammatory  
response syndrome, history of diastolic heart failure, pulmonary hypertension,  
emphysema, and hypertension, episodes of prior acute respiratory failure, in  
February 2020 this patient had acute sigmoid colon perforation COMPARISON: Previous chest x-ray from March 2020, PACS down. TECHNIQUE: Single AP view FINDINGS:   
   
The heart is top normal in size to mildly enlarged. The main pulmonary arteries  
are prominent. There is cephalization of pulmonary vascular flow. Mildly  
prominent interstitial markings are seen throughout the lungs with less  
involvement in the right upper lung similar to previous exam. There is no  
pneumothorax. Abrahan Malloy CT Results  (Last 48 hours) 05/16/20 1605  CTA 1144 [facility] CONT Final result Impression:  IMPRESSION:  
   
1. No evidence of pulmonary embolism, aortic aneurysm, or dissection. Peripheral  
branches are motion limited. 2. Emphysema with endobronchial debris. Left greater than right basilar lung  
consolidations could represent regions of atelectasis versus pneumonia or other  
acute airspace process. 3. New T6 compression fracture. Multiple other similar chronic compression  
deformities. 4. Stable nodule or scarring at the left lung apex. This is stable since at  
least 2016 not requiring follow-up per current Fleischner guidelines. 5. Enlarged main pulmonary artery may indicate pulmonary arterial hypertension. 6. Chronic stable splenic nodules. Refer to prior reports. Narrative:  CTA CHEST PULMONARY EMBOLISM PROTOCOL INDICATION: Difficulty breathing. Respiratory distress. Question pulmonary  
embolism. TECHNIQUE: Thin collimation axial images obtained through the level of the  
pulmonary arteries with additional imaging through the chest following the  
uneventful administration of nonionic intravenous contrast.  Images  
reconstructed into MIP coronal and sagittal projections for complete evaluation  
of the tortuous and overlapping pulmonary vascular structures and to reduce  
patient radiation dose. All CT scans are performed using dose optimization  
techniques as appropriate to the performed exam including the following: Automated exposure control, adjustment of mA and/or kV according to patient  
size, and use of iterative reconstructive technique. COMPARISON: 2/29/2020 FINDINGS:  
   
No filling defects are appreciated within the main, left, right, lobar or  
visualized segmental pulmonary arteries to suggest embolism. Peripheral  
subsegmental pulmonary artery branches are motion limited. The main pulmonary  
artery is enlarged measuring 3.6 cm. The thoracic aorta is not aneurysmal.  No  
evidence for dissection. No pericardial effusion. No pleural effusion. No enlarged axillary, hilar, or  
mediastinal lymphadenopathy. Degenerative changes in the shoulders and spine. Thoracic fusion hardware. Chronic lower right rib fracture deformities. Chronic T5, T6, T9, L2 vertebral compression deformities. The T6 compression fracture  
site is new from the prior CT exam, but is otherwise age-indeterminate. Emphysema. Motion artifact. Stable 7 mm nodule in the high left lung apex and  
other apical chronic architectural distortion. Dependent consolidation in the  
basilar left greater than right lower lobe. There is endobronchial debris  
present in the lower lungs. Hypodense splenic nodules are not significantly changed. Complex decision making was made in the evaluation and management plans during this consultation. More than 50% of time was spent in counseling and coordination of care including review of data and discussion with other team members. Josefina Mcintosh DO, [facility] Pulmonary, Critical Care, and Sleep Medicine

## 2020-05-17 NOTE — ED NOTES
Spoke to dr Nila Pleitez and informed that pt was found diaphoretic, VSS and BS stable. MD to order methadone.

## 2020-05-17 NOTE — ED NOTES
Pt cleaned up, linen changed, skin care done. Pt colostomy replaced, occult blood sample sent to lab. Pt is now cleaned up, new gown, pt on monitor. Pt reports no additional needs at this time.

## 2020-05-17 NOTE — PROGRESS NOTES
Notified by RN that pt's Hgb has dropped from 13.4 to 9.1 since admission. Repeat Hgb 8.7 Hemoccult ordered but pt has colostomy, ED doesn't have supplies for colostomy Per ED RN, floor does have supplies for colostomy, hemoccult can be done once pt goes to floor Will get H&H q6hrs Will check LDH, haptoglobin and peripheral smear H. Ijeoma Gomez, DO May 17, 2020

## 2020-05-17 NOTE — PROGRESS NOTES
Hospitalist Progress Note Patient: Tarik Ahr Age: 62 y.o. : 1955 MR#: [id number] SSN: [id number] Date/Time: 2020 9:12 AM 
 
DOA: 2020 PCP: Elton Garcia NP Subjective:  
 
Patient remains on high flow oxygen and tolerates well. She feels shaky. Last Methadone dosing yesterday. Apparently, she was happy after starting on Methadone, hence she was celebrating with some stress drug. \"I don't know if it was bad. I think they might have that bad stuff in it. \" She was concerning if the drug she used might content fentanyl. Otherwise, feels her breathing is better. But still coughing. No chest pain. No stomach pain. Wound care has been managed by [address] [place] [address]. Interval Hospital Course: 
62 y.o female with polysubstance abuse, severe COPD with sarcoidosis on chronic prednisone, HTN, paroxysmal AFIB on Eliquis, recent hospitalization for perforated viscous, colostomy,  presented from home with worsened shortness of breath, cough. She normally needs 3 liters oxygen. Recently was enrolled in Methadone clinic but still using street drug. EMS found her to be respiratory failure required more oxygenation. In the ER, she was started on sepsis Rx. She was given Levafloxacin, Vancomycin, stress dose steroid was also givens since she is on chronic steroid at home. COVID-19 infection suspected. ROS: No current fever/chills, no headache, no dizziness, no facial pain, no sinus congestion, +cough No swallowing pain, No chest pain, no palpitation, ++ shortness of breath, no abd pain, No diarrhea, no urinary complaint, no leg pain or swelling Assessment/Plan: 1. Sepsis, poa, likely from pneumonia 2. Pneumonia, possible aspiration vs COVID-19 infection 3. Acute on chronic respiratory failure with hypoxia 4. Acute COPD exacerbation 5. Hypotension, possible associate with sepsis, hypovolemic, adrenal insufficiency 6. Pulmonary HTN, moderate to severe 7.  Sarcoidosis with chronic steroid 8. Polysubstance abuse with possible withdrawal symptom 9. Paroxysmal AFIB, in sinus 10. Hypokalemia, Hypomagnesemia 11. Hyponatremia 12. Elevated bili, AST 13. Active tobacco smoking 14. Hypothyroidism 15. Hyperglycemia with DM2 
16. Hyperlipidemia 17. Sickle cell trait with anemia, iron deficiency anemia 18. Leukocytosis 19. Present of colostomy 20. Midline abd wound, no infection Patient has opioid withdrawal symptom, will resume her methadone at 20mg daily. Close monitor. Will stop levofloxacin due to risk of prolong QTc. Continue Vancomycin, start Cefepime, add flagyl. Blood cultures follow up, sputum culture follow COVID-19 test follow up She remains on high flow. Can cont steroid, bronchodilator. Pulmonary to follow up. Cont Daliresp Continue Zoloft, Crestor, PPI, Synthroid, ASA, Amiodarone, Eliquis ISS, Lantus Hgb/Hct monitor. Ask nurse to sent stool from colostomy bag. She has taking her Eliquis this AM. Daily LDH, ferritin, d-dimer, CRP, check procalcitonin tomorrow US abd followup PPI Wound care consult Full Code Additional Notes:   
Time spent >30 minutes Case discussed with:  [x]Patient  []Family  [x]Nursing  [x]Case Management DVT Prophylaxis:  []Lovenox  []Hep SQ  [x]Eliquis  []Coumadin   []On Heparin gtt Signed By: Reshma Sandra MD   
 May 17, 2020 9:12 AM  
  
 
 
 
 
Objective:  
VS:  
Visit Vitals /59 (BP 1 Location: Right arm, BP Patient Position: At rest) Pulse 85 Temp 98.5 °F (36.9 °C) Resp 26 Ht 5' 2\" (1.575 m) Wt 74.8 kg (165 lb) LMP 2012 (Exact Date) SpO2 94% BMI 30.18 kg/m² Tmax/24hrs: Temp (24hrs), Av.3 °F (37.4 °C), Min:98.5 °F (36.9 °C), Max:100 °F (37.8 °C) No intake or output data in the 24 hours ending 20 0912 Tele: sinus General:  Cooperative, Not in acute distress, speaks in full sentence while in bed HEENT: PERRL, EOMI, supple neck, no JVD, dry oral mucosa Cardiovascular: S1S2 regular, no rub/gallop Pulmonary: air entry bilaterally, no wheezing, ++ crackle GI:  Soft, non tender, non distended, +bs, no guarding +midline wound no infection Extremities:  No pedal edema, +distal pulses appreciated Neuro: AOx3, moving all extremities, no gross deficit. Additional:  
 
 
Current Facility-Administered Medications Medication Dose Route Frequency  sodium chloride (NS) flush 5-10 mL  5-10 mL IntraVENous PRN  
 levoFLOXacin (LEVAQUIN) 750 mg in D5W IVPB  750 mg IntraVENous Q24H  
 vancomycin (VANCOCIN) 1,000 mg in 0.9% sodium chloride (MBP/ADV) 250 mL adv  1,000 mg IntraVENous Q12H  hydrocortisone Sod Succ (PF) (SOLU-CORTEF) injection 50 mg  50 mg IntraVENous Q6H  
 acetaminophen (TYLENOL) tablet 650 mg  650 mg Oral Q4H PRN  
 amiodarone (CORDARONE) tablet 200 mg  200 mg Oral DAILY  apixaban (ELIQUIS) tablet 5 mg  5 mg Oral BID  ascorbic acid (vitamin C) (VITAMIN C) tablet 250 mg  250 mg Oral DAILY WITH BREAKFAST  aspirin chewable tablet 81 mg  81 mg Oral DAILY WITH BREAKFAST  insulin glargine (LANTUS) injection 10 Units  10 Units SubCUTAneous DAILY  levothyroxine (SYNTHROID) tablet 100 mcg  100 mcg Oral ACB  pantoprazole (PROTONIX) tablet 40 mg  40 mg Oral ACB&D  
 roflumilast (DALIRESP) tablet 250 mcg  250 mcg Oral DAILY  sertraline (ZOLOFT) tablet 50 mg  50 mg Oral DAILY  rosuvastatin (CRESTOR) tablet 5 mg  5 mg Oral QHS  glycopyrrolate-formoterol (BEVESPI AEROSPHERE) 9 mcg-4.8 mcg inhaler  2 Puff Inhalation BID  albuterol (PROVENTIL HFA, VENTOLIN HFA, PROAIR HFA) inhaler 1 Puff  1 Puff Inhalation Q4H RT  
 
Current Outpatient Medications Medication Sig  
 umeclidinium-vilanteroL (Anoro Ellipta) 62.5-25 mcg/actuation inhaler INHALE 1 PUFF DAILY  albuterol (ProAir HFA) 90 mcg/actuation inhaler INHALE 2 PUFFS EVERY 4 HOURS AS NEEDED FOR WHEEZING  
  furosemide (LASIX) 20 mg tablet Take 1 Tab by mouth daily.  apixaban (ELIQUIS) 5 mg tablet Take 1 Tab by mouth two (2) times a day.  amLODIPine (NORVASC) 5 mg tablet Take 1 Tab by mouth daily.  spironolactone (ALDACTONE) 25 mg tablet Take 1 Tab by mouth daily.  predniSONE (DELTASONE) 10 mg tablet Take 30 mg by mouth daily (with breakfast).  white petrolatum (Protective Ointment) topical ointment Apply 1 Applicator to affected area two (2) times a week.  insulin glargine (LANTUS) 100 unit/mL injection 10 Units by SubCUTAneous route daily.  pantoprazole (PROTONIX) 40 mg tablet Take 1 Tab by mouth Before breakfast and dinner.  therapeutic multivitamin (THERAGRAN) tablet Take 1 Tab by mouth daily.  magnesium oxide (MAG-OX) 400 mg tablet Take 1 Tab by mouth daily.  roflumilast (DALIRESP) 250 mcg tab Take 250 mcg by mouth daily.  tolterodine (DETROL) 1 mg tablet Take 1 Tab by mouth.  traMADol (ULTRAM) 50 mg tablet Take 1 Tab by mouth.  busPIRone (BUSPAR) 15 mg tablet Take 15 mg by mouth two (2) times a day. Indications: repeated episodes of anxiety  OXYGEN-AIR DELIVERY SYSTEMS 3 L/min by Nasal route continuous. First Choice O2  
 albuterol-ipratropium (DUO-NEB) 2.5 mg-0.5 mg/3 ml nebu 3 mL by Nebulization route every six (6) hours as needed for Wheezing.  cholecalciferol (VITAMIN D3) (1000 Units /25 mcg) tablet Take 1,000 Units by mouth daily.  levothyroxine (SYNTHROID) 100 mcg tablet Take 100 mcg by mouth Daily (before breakfast).  aspirin 81 mg chewable tablet Take 1 Tab by mouth daily (with breakfast). Indications: stroke prevention  ascorbic acid, vitamin C, (VITAMIN C) 250 mg tablet Take 1 Tab by mouth daily (with breakfast).  calcium citrate 200 mg (950 mg) tablet Take 1 Tab by mouth two (2) times a day. Indications: post-menopausal osteoporosis prevention  ferrous sulfate 325 mg (65 mg iron) tablet Take 1 Tab by mouth daily (with breakfast).  acetaminophen (TYLENOL) 325 mg tablet Take 2 Tabs by mouth every four (4) hours as needed for Pain. Indications: pain  brief disposable (ADULT) misc by Does Not Apply route.  polyethylene glycol (MIRALAX) 17 gram/dose powder Take 17 g by mouth daily. 1 tablespoon with 8 oz of water daily  BD INSULIN SYRINGE ULTRA-FINE 1 mL 31 gauge x 5/16 syrg  rosuvastatin (CRESTOR) 5 mg tablet TAKE ONE TABLET NIGHTLY (Patient taking differently: Take 5 mg by mouth nightly. TAKE ONE TABLET NIGHTLY)  insulin lispro (HUMALOG) 100 unit/mL injection To be given 15 min before meals: < 150 - None, 151-200 - 2 u, 201-250 - 4 u, 251-300 - 6 u, 301-350 - 8 u, 351-400 - 10 u, >400 - 12 u (Patient taking differently: by SubCUTAneous route three (3) times daily as needed for Other (subcutaneous three times daily as needed before meals per sliding scale. ). To be given 15 min before meals: < 150 - None, 151-200 - 2 u, 201-250 - 4 u, 251-300 - 6 u, 301-350 - 8 u, 351-400 - 10 u, >400 - 12 u)  sertraline (ZOLOFT) 50 mg tablet Take 50 mg by mouth daily. Indications: Bipolar Depression  amiodarone (CORDARONE) 200 mg tablet Take 1 Tab by mouth daily. Lab/Data Review: 
Labs: Results:  
   
Chemistry Recent Labs 05/17/20 
0500 05/16/20 
1100 * 171*  135* K 3.4* 3.2*  
 93* CO2 33* 36* BUN 12 16 CREA 0.77 1.00 BUCR 16 16 AGAP 5 6 CA 7.5* 7.9*  
PHOS 3.6 5.6* Recent Labs 05/17/20 
0500 05/16/20 
1100 ALT 19 29 SGOT 22 48*  
TP 5.9* 7.8 ALB 2.6* 3.1*  
GLOB 3.3 4.7* AGRAT 0.8 0.7* CBC w/Diff Recent Labs 05/17/20 
0533 05/17/20 
0500 05/16/20 
1100 WBC  --  11.1 18.6*  
RBC  --  3.59* 5.21  
HGB 8.7* 9.1* 13.4 HCT 28.1* 29.4* 43.7 MCV  --  81.9 83.9 MCH  --  25.3 25.7 MCHC  --  31.0 30.7*  
RDW  --  17.2* 17.4* PLT  --  133* 163 GRANS  --  91* 76* LYMPH  --  6* 21 EOS  --  0 0 Coagulation Recent Labs 05/17/20 
0500 05/16/20 1100  
PTP 17.2* 16.9* INR 1.4* 1.4* APTT 36.8*  --   
   
Iron/Ferritin Lab Results Component Value Date/Time Iron 11 (L) 12/13/2019 05:45 AM  
 TIBC 215 (L) 12/13/2019 05:45 AM  
 Iron % saturation 5 12/13/2019 05:45 AM  
 Ferritin 77 05/16/2020 08:45 PM  
   
BNP Cardiac Enzymes Lab Results Component Value Date/Time  (H) 03/02/2020 02:20 PM  
 CK - MB 2.2 03/02/2020 02:20 PM  
 CK-MB Index 0.8 03/02/2020 02:20 PM  
 Troponin-I, QT 0.04 05/16/2020 11:00 AM  
 
  
Lactic Acid Thyroid Studies All Micro Results Procedure Component Value Units Date/Time CULTURE, BLOOD [[id number]] Collected:  05/16/20 1100 Order Status:  Completed Specimen:  Blood Updated:  05/17/20 3273 Special Requests: NO SPECIAL REQUESTS Culture result: NO GROWTH AFTER 19 HOURS     
 CULTURE, BLOOD [[id number]] Collected:  05/16/20 1140 Order Status:  Completed Specimen:  Blood Updated:  05/17/20 8326 Special Requests: NO SPECIAL REQUESTS Culture result: NO GROWTH AFTER 17 HOURS     
 RESPIRATORY PANEL,PCR,NASOPHARYNGEAL [[id number]] Collected:  05/16/20 2045 Order Status:  Completed Specimen:  NASOPHARYNGEAL SWAB Updated:  05/16/20 2103 Jose D Gallus [[id number]] Collected:  05/16/20 1837 Order Status:  Completed Specimen:  Cath Urine Updated:  05/16/20 1930 Images: 
 
CT (Most Recent). CT Results (most recent): 
Results from [facility] Encounter encounter on 05/16/20 CTA CHEST W OR W WO CONT Narrative CTA CHEST PULMONARY EMBOLISM PROTOCOL INDICATION: Difficulty breathing. Respiratory distress. Question pulmonary 
embolism. TECHNIQUE: Thin collimation axial images obtained through the level of the 
pulmonary arteries with additional imaging through the chest following the 
uneventful administration of nonionic intravenous contrast.  Images 
reconstructed into MIP coronal and sagittal projections for complete evaluation of the tortuous and overlapping pulmonary vascular structures and to reduce 
patient radiation dose. All CT scans are performed using dose optimization 
techniques as appropriate to the performed exam including the following: Automated exposure control, adjustment of mA and/or kV according to patient 
size, and use of iterative reconstructive technique. COMPARISON: 2/29/2020 FINDINGS: 
 
No filling defects are appreciated within the main, left, right, lobar or 
visualized segmental pulmonary arteries to suggest embolism. Peripheral 
subsegmental pulmonary artery branches are motion limited. The main pulmonary 
artery is enlarged measuring 3.6 cm. The thoracic aorta is not aneurysmal.  No 
evidence for dissection. No pericardial effusion. No pleural effusion. No enlarged axillary, hilar, or 
mediastinal lymphadenopathy. Degenerative changes in the shoulders and spine. Thoracic fusion hardware. Chronic lower right rib fracture deformities. Chronic T5, T6, T9, L2 vertebral compression deformities. The T6 compression fracture 
site is new from the prior CT exam, but is otherwise age-indeterminate. Emphysema. Motion artifact. Stable 7 mm nodule in the high left lung apex and 
other apical chronic architectural distortion. Dependent consolidation in the 
basilar left greater than right lower lobe. There is endobronchial debris 
present in the lower lungs. Hypodense splenic nodules are not significantly changed. Impression IMPRESSION: 
 
1. No evidence of pulmonary embolism, aortic aneurysm, or dissection. Peripheral 
branches are motion limited. 2. Emphysema with endobronchial debris. Left greater than right basilar lung 
consolidations could represent regions of atelectasis versus pneumonia or other 
acute airspace process. 3. New T6 compression fracture. Multiple other similar chronic compression 
deformities. 4. Stable nodule or scarring at the left lung apex. This is stable since at least 2016 not requiring follow-up per current Fleischner guidelines. 5. Enlarged main pulmonary artery may indicate pulmonary arterial hypertension. 6. Chronic stable splenic nodules. Refer to prior reports. XRAY (Most Recent) EKG No results found for this or any previous visit. 2D ECHO

## 2020-05-17 NOTE — PROGRESS NOTES
Hospitalist Update Went back to evaluate patient and her last three blood pressures the systolics were in the 22P. Albumin was hanging and patient just received her first dose of stress dose steroids. Patient is awake, alert, in NAD. Appears stable for step down at this time. Alda Perkins PA-C 
05/16/20

## 2020-05-17 NOTE — ROUTINE PROCESS
Verbal bedside shift report given to Reshma Hartman, RN on coming nurse by Dave Spencer RN off going nurse including SBAR, KARDEX and [facility]

## 2020-05-18 NOTE — PROGRESS NOTES
[facility] Pulmonary, Critical Care, and Sleep Medicine Name: Dee Greene MRN: [id number] : 1955 Hospital: [address] [place] [address] Date: 2020 Pulmonary Medicine -Progress note IMPRESSION:  
· Respiratory Failure- acute on chronic-hypoxic and hypercapnic- On 3LPM at home at baseline · Pneumonia: CAP, Aspiration, HAP. Bibasilar atelectasis with suspected mucous retention and inability to clear after her recent prolonged complicated abdominal surgery. CT scan with findings of Emphysema with endobronchial debris. Left greater than right basilar lung and consolidation at bases with airspace process further complicated by new T6 compression fracture as well as multiple other chronic compressions · COVID negative · COPD with possible component of acute exacerbation- No recent Fev1 · Transaminitis · Sarcoidosis- chronic · Hypotension: Upon admission - responded to IVF- etiology could be due to: dehydration, sepsis and/or adrenal insufficiency- On and off steroids · Pulmonary Hypertension- chronic · Heroine use- acute on chronic with signs of withdrawal today- endorses last snorting Heroin on evening of 5/15/2020. Intermittently on methadone · PAFIB- sinus on telemetry · Colostomy- left side. S/P Ex-Lap: 20- perforated bowel, sigmoid colectomy with drainage of multiple stool and abscess collections · Active tobacco smoker · DM 
· Hypothyroidism- On synthroid and Amiodarone- TSH normal 20 · HLD · Chronic pain- limited ROMS- ambulates via wheelchair, Thoracic level rods present, multiple compression fractures- most recently noted T6. 
· Anemia- Sickle Cell trait with iron deficiency- no signs of acute bleeding at this time · Splenic nodules- stable per recent CT 
· Obesity · H/O S. Epi bacteremia- early march- sensitive to Vanco 
· H/O E. Coli URI: - Levaquin Resistant · Poor Dentition RECOMMENDATIONS:  
 · Keep HOB elevated; Maintain SpO2 90-94% · Pulmonary hygiene with IS at bedside · Will add mucolytic's and intensify bronchial hygiene · Continue Eliquis- high risk for VTE in the clinical setting and comorbidities · Nicotine Patch · Antibiotics to be de-escalated-discussed with Dr. Jaguar Chapin · Continue Daliresp · Continue with Bevespi (LAMA-LABA) 2 puffs BID- monitor heart rate · Continue nebulized Pulmicort till discharge · Add Spacer for MDIs · Albuterol 2 puffs Q 4 hours- monitor heart rate · Continue protonix · Continue stress dose steroids · Colostomy care per nursing · Will continue to follow. Due to complexity of chronic medical conditions- patient is at high risk for clinical decompensation. · Further recommendations and therapies pending clinical course and results of pending studies Subjective/History: This patient has been seen and evaluated at the request of Dr. Leanna Arizmendi for Acute on Chronic Hypoxic Respiratory Failure. 05/18/20 States she feels significantly improved States she always gets pneumonia when the weather changes Still has a cough and has some difficulty expectorating the mucus-asking for cough syrup Asking to go home since she is breathing significantly better Denies fever chills Complains of back pain when sits upright. Asking to eat COVID testing resulted negative HPI Patient is a 62 y.o. female who presented to [facility] ED for increasing respiratory distress, tachypnea  and hypotension. Patient has a complex medical history to include: COPD, pulmonary hypertension, hypoxic respiratory failure, tobacco smoker, substance abuse, chronic pain and most recently a hospitalization in Feb/march for perforated bowel requiring sigmoid colectomy and left sided colostomy. At time of my evaluation today, the patient was in ED-bed 20.   She is mildly diaphoretic and is shaking her legs which she states is characteristic for her Opioid withdrawal symptoms. She reports snoring heroin last Friday night and she has also been taking methadone. She continues to smoker cigarettes. She reports that about 5 days ago she had 2 days of coughing up dark- brown sputum. Sputum per her report today is now clearer. She denies hemoptysis. No known bouts of aspiration or chocking. Her Colostomy is full of firm brown stool- no reports of blood from ostomy. She reports proper care of her ostomy. She reports limited ROM and can stand with some assistance. She has a non- motorized wheelchair which she has been using for mobility Past Medical History:  
Diagnosis Date  Abnormally low high density lipoprotein (HDL) cholesterol with hypertriglyceridemia Lipid profile (11/6/2016) showed , , HDL 38, LDL 37  Acute blood loss as cause of postoperative anemia 12/12/2019  Anxiety  Bipolar affective disorder ([address] 12/5/2012  Cellulitis of right forearm 05/04/2017  Chronic back pain  Chronic obstructive pulmonary disease (COPD) ([address] SOB, on [place] O2 Recent admission with psychosis  Chronic respiratory failure with hypoxia ([address] On home oxygen inhalation at 3 LPM via NC  
 Contusion of left elbow 5/4/2017  Depression  Diabetic neuropathy associated with type 2 diabetes mellitus ([address]  Diastolic dysfunction without heart failure Stable on diuretics  Falls frequently  Gastroesophageal reflux disease with hiatal hernia  Generalized osteoarthritis of multiple sites  Gout On Allopurinol  History of acute renal failure 5/31/2013  History of back injury   
 jumped out of second story window  History of fracture due to fall 12/11/2019  
 History of hepatitis C   
 treated  History of penicillin allergy  History of vitamin D deficiency 5/10/2017  Hypertensive heart disease without heart failure Better controlled  Hyperuricemia 5/26/2017  Hypothyroidism  Hypoxemia requiring supplemental oxygen 2014  Intravenous drug user 2017  Long term current use of aspirin  Memory difficulty  Menopause  Mixed connective tissue disease ([facility] 5/10/2017  Obesity, Class I, BMI 30-34.9  Olecranon bursitis of right elbow 2017  Opioid dependence ([facility] On Methadone  Recurrent genital herpes 2013  Right Achilles tendinitis 2017  Sarcoidosis  Sickle cell trait ([facility]  Tobacco use disorder  Type 2 diabetes mellitus with diabetic neuropathy, with long-term current use of insulin ([facility] HbA1c (2019) = 7.1  Urge urinary incontinence 5/10/2017  Venous insufficiency  Wears glasses Past Surgical History:  
[facility]  SECTION    
 HX CYST REMOVAL Left  Left foot  HX OTHER SURGICAL Left 2017 S/P incision and drainage of the abscess of the left hand and the left foot (2017 - Dr. Graceann Dubin. Meet Blackman)  HX THORACIC LAMINECTOMY  2019 S/P T10, T9, T8 laminectomy; T7, T8, T9, T10, T11, T12 posterior thoracic fusion; segmental spinal instrumentation; K2M Davis type, T7-T8, T11-T12 (2019 - Dr. Antonio Castillo)  HX TUBAL LIGATION Current Facility-Administered Medications Medication Dose Route Frequency  doxycycline (VIBRAMYCIN) capsule 100 mg  100 mg Oral Q12H  
 lactobacillus sp. 50 billion cpu (BIO-K PLUS) capsule 1 Cap  1 Cap Oral DAILY  nicotine (NICODERM CQ) 21 mg/24 hr patch 1 Patch  1 Patch TransDERmal DAILY  budesonide (PULMICORT) 500 mcg/2 ml nebulizer suspension  500 mcg Nebulization BID RT  
 hydrocortisone Sod Succ (PF) (SOLU-CORTEF) injection 50 mg  50 mg IntraVENous Q6H  
 amiodarone (CORDARONE) tablet 200 mg  200 mg Oral DAILY  apixaban (ELIQUIS) tablet 5 mg  5 mg Oral BID  ascorbic acid (vitamin C) (VITAMIN C) tablet 250 mg  250 mg Oral DAILY WITH BREAKFAST  aspirin chewable tablet 81 mg  81 mg Oral DAILY WITH BREAKFAST  insulin glargine (LANTUS) injection 10 Units  10 Units SubCUTAneous DAILY  levothyroxine (SYNTHROID) tablet 100 mcg  100 mcg Oral ACB  pantoprazole (PROTONIX) tablet 40 mg  40 mg Oral ACB&D  
 roflumilast (DALIRESP) tablet 250 mcg  250 mcg Oral DAILY  sertraline (ZOLOFT) tablet 50 mg  50 mg Oral DAILY  rosuvastatin (CRESTOR) tablet 5 mg  5 mg Oral QHS  glycopyrrolate-formoterol (BEVESPI AEROSPHERE) 9 mcg-4.8 mcg inhaler  2 Puff Inhalation BID  albuterol (PROVENTIL HFA, VENTOLIN HFA, PROAIR HFA) inhaler 1 Puff  1 Puff Inhalation Q4H RT Allergies Allergen Reactions  Moxifloxacin Rash  Pcn [Penicillins] Swelling  Sulfa (Sulfonamide Antibiotics) Swelling Review of Systems: 
Pertinent items are noted in HPI. Objective:  
Vital Signs:   
Visit Vitals /70 (BP 1 Location: Right arm, BP Patient Position: At rest) Pulse 66 Temp 98.4 °F (36.9 °C) Resp 20 Ht 5' 2\" (1.575 m) Wt 74.2 kg (163 lb 9.6 oz) LMP 2012 (Exact Date) SpO2 99% BMI 29.92 kg/m² O2 Device: Nasal cannula O2 Flow Rate (L/min): 5 l/min Temp (24hrs), Av.1 °F (36.7 °C), Min:97.1 °F (36.2 °C), Max:99.9 °F (37.7 °C) Intake/Output:  
Last shift:      No intake/output data recorded. Last 3 shifts:  1901 -  0700 In: 880 [I.V.:880] Out: 325 [Urine:325] Intake/Output Summary (Last 24 hours) at 2020 1551 Last data filed at 2020 6904 Gross per 24 hour Intake 630 ml Output 325 ml Net 305 ml Physical Exam: 
 
General:  Alert, cooperative, mild distress, sitting up in bed in ED Head:  Normocephalic, without obvious abnormality, atraumatic. Eyes:  Conjunctivae/corneas clear. PERRL, EOMs intact. Nose: Nares normal. Septum midline. Mucosa normal. No drainage or sinus tenderness. Throat: Lips, mucosa, and tongue normal. - Poor dentition- no oral exudates Neck: Supple, symmetrical, trachea midline, no adenopathy, thyroid: no enlargment/tenderness/nodules, no JVD. Back:   + Kyphosis, limited ROM Lungs:   No Auscultation, with NC in place, No audible wheezing or stridor, mild accessory muscle use Chest wall:  No tenderness - some increase in A-P diameter and skeletal chest wall asymetry. Heart:  Pulse- rapid and regular- No auscultation Abdomen:   Soft, non-tender. No masses,  No organomegaly. Mild line incision- healed- left sided colostomy- full of form stool Extremities: Extremities normal, atraumatic, no cyanosis or edema. - limited ROM with some chronic changes Pulses: 2+ and symmetric all extremities. Skin: Skin color, texture, turgor normal. No rashes or lesions Lymph nodes: 
 
  Cervical, supraclavicular nodes are unremarkable Neurologic: Limited ROM, alert and oriented, deconditioning Data:  
 
 
Lab Results Component Value Date/Time C-Reactive protein 4.0 (H) 05/18/2020 03:41 AM  
 
Lab Results Component Value Date/Time D DIMER 0.40 05/18/2020 03:41 AM  
 
Lab Results Component Value Date/Time Ferritin 88 05/18/2020 03:41 AM  
 
  
 
Recent Results (from the past 24 hour(s)) HGB & HCT Collection Time: 05/17/20  8:16 PM  
Result Value Ref Range HGB 9.1 (L) 12.0 - 16.0 g/dL HCT 30.2 (L) 35.0 - 45.0 % Oniel Wray Collection Time: 05/17/20 11:31 PM  
Result Value Ref Range Vancomycin,trough 16.4 10.0 - 20.0 ug/mL Reported dose date: [id number] Reported dose time: 1200 Reported dose: 1000 MG UNITS  
CBC WITH AUTOMATED DIFF Collection Time: 05/18/20  3:41 AM  
Result Value Ref Range WBC 9.4 4.6 - 13.2 K/uL  
 RBC 3.77 (L) 4.20 - 5.30 M/uL HGB 9.4 (L) 12.0 - 16.0 g/dL HCT 31.5 (L) 35.0 - 45.0 % MCV 83.6 74.0 - 97.0 FL  
 MCH 24.9 24.0 - 34.0 PG  
 MCHC 29.8 (L) 31.0 - 37.0 g/dL  
 RDW 17.6 (H) 11.6 - 14.5 % PLATELET 675 863 - 670 K/uL  MPV 9.2 9.2 - 11.8 FL  
 NEUTROPHILS 90 (H) 40 - 73 % LYMPHOCYTES 6 (L) 21 - 52 % MONOCYTES 4 3 - 10 % EOSINOPHILS 0 0 - 5 % BASOPHILS 0 0 - 2 %  
 ABS. NEUTROPHILS 8.5 (H) 1.8 - 8.0 K/UL  
 ABS. LYMPHOCYTES 0.5 (L) 0.9 - 3.6 K/UL  
 ABS. MONOCYTES 0.4 0.05 - 1.2 K/UL  
 ABS. EOSINOPHILS 0.0 0.0 - 0.4 K/UL  
 ABS. BASOPHILS 0.0 0.0 - 0.1 K/UL  
 DF AUTOMATED MAGNESIUM Collection Time: 05/18/20  3:41 AM  
Result Value Ref Range Magnesium 2.3 1.6 - 2.6 mg/dL METABOLIC PANEL, BASIC Collection Time: 05/18/20  3:41 AM  
Result Value Ref Range Sodium 139 136 - 145 mmol/L Potassium 3.5 3.5 - 5.5 mmol/L Chloride 102 100 - 111 mmol/L  
 CO2 34 (H) 21 - 32 mmol/L Anion gap 3 3.0 - 18 mmol/L Glucose 356 (H) 74 - 99 mg/dL BUN 9 7.0 - 18 MG/DL Creatinine 0.85 0.6 - 1.3 MG/DL  
 BUN/Creatinine ratio 11 (L) 12 - 20 GFR est AA >60 >60 ml/min/1.73m2 GFR est non-AA >60 >60 ml/min/1.73m2 Calcium 8.0 (L) 8.5 - 10.1 MG/DL  
PHOSPHORUS Collection Time: 05/18/20  3:41 AM  
Result Value Ref Range Phosphorus 2.0 (L) 2.5 - 4.9 MG/DL  
LD Collection Time: 05/18/20  3:41 AM  
Result Value Ref Range  (H) 81 - 234 U/L FERRITIN Collection Time: 05/18/20  3:41 AM  
Result Value Ref Range Ferritin 88 8 - 388 NG/ML  
D DIMER Collection Time: 05/18/20  3:41 AM  
Result Value Ref Range D DIMER 0.40 <0.46 ug/ml(FEU) C REACTIVE PROTEIN, QT Collection Time: 05/18/20  3:41 AM  
Result Value Ref Range C-Reactive protein 4.0 (H) 0 - 0.3 mg/dL CK Collection Time: 05/18/20  3:41 AM  
Result Value Ref Range  26 - 192 U/L  
HEPATIC FUNCTION PANEL Collection Time: 05/18/20  3:41 AM  
Result Value Ref Range Protein, total 6.2 (L) 6.4 - 8.2 g/dL Albumin 2.6 (L) 3.4 - 5.0 g/dL Globulin 3.6 2.0 - 4.0 g/dL A-G Ratio 0.7 (L) 0.8 - 1.7 Bilirubin, total 0.6 0.2 - 1.0 MG/DL Bilirubin, direct 0.3 (H) 0.0 - 0.2 MG/DL Alk.  phosphatase 64 45 - 117 U/L  
 AST (SGOT) 76 (H) 10 - 38 U/L  
 ALT (SGPT) 41 13 - 56 U/L  
VITAMIN B12 & FOLATE Collection Time: 05/18/20  3:41 AM  
Result Value Ref Range Vitamin B12 910 211 - 911 pg/mL Folate >20.0 (H) 3.10 - 17.50 ng/mL IRON PROFILE Collection Time: 05/18/20  3:41 AM  
Result Value Ref Range Iron 11 (L) 50 - 175 ug/dL TIBC 283 250 - 450 ug/dL Iron % saturation 4 (L) 20 - 50 % GLUCOSE, POC Collection Time: 05/18/20  8:00 AM  
Result Value Ref Range Glucose (POC) 298 (H) 70 - 110 mg/dL Recent Labs 05/16/20 
1153 HCO3I 38.8* PCO2I 56.0*  
PHI 7.448 PO2I 56* Imaging: 
I have personally reviewed the patients radiographs and have reviewed the reports: CXR Results  (Last 48 hours) None CT Results  (Last 48 hours) 05/16/20 1605  CTA 1144 [facility] CONT Final result Impression:  IMPRESSION:  
   
1. No evidence of pulmonary embolism, aortic aneurysm, or dissection. Peripheral  
branches are motion limited. 2. Emphysema with endobronchial debris. Left greater than right basilar lung  
consolidations could represent regions of atelectasis versus pneumonia or other  
acute airspace process. 3. New T6 compression fracture. Multiple other similar chronic compression  
deformities. 4. Stable nodule or scarring at the left lung apex. This is stable since at  
least 2016 not requiring follow-up per current Fleischner guidelines. 5. Enlarged main pulmonary artery may indicate pulmonary arterial hypertension. 6. Chronic stable splenic nodules. Refer to prior reports. Narrative:  CTA CHEST PULMONARY EMBOLISM PROTOCOL INDICATION: Difficulty breathing. Respiratory distress. Question pulmonary  
embolism.   
   
TECHNIQUE: Thin collimation axial images obtained through the level of the  
pulmonary arteries with additional imaging through the chest following the  
 uneventful administration of nonionic intravenous contrast.  Images  
reconstructed into MIP coronal and sagittal projections for complete evaluation  
of the tortuous and overlapping pulmonary vascular structures and to reduce  
patient radiation dose. All CT scans are performed using dose optimization  
techniques as appropriate to the performed exam including the following: Automated exposure control, adjustment of mA and/or kV according to patient  
size, and use of iterative reconstructive technique. COMPARISON: 2/29/2020 FINDINGS:  
   
No filling defects are appreciated within the main, left, right, lobar or  
visualized segmental pulmonary arteries to suggest embolism. Peripheral  
subsegmental pulmonary artery branches are motion limited. The main pulmonary  
artery is enlarged measuring 3.6 cm. The thoracic aorta is not aneurysmal.  No  
evidence for dissection. No pericardial effusion. No pleural effusion. No enlarged axillary, hilar, or  
mediastinal lymphadenopathy. Degenerative changes in the shoulders and spine. Thoracic fusion hardware. Chronic lower right rib fracture deformities. Chronic T5, T6, T9, L2 vertebral compression deformities. The T6 compression fracture  
site is new from the prior CT exam, but is otherwise age-indeterminate. Emphysema. Motion artifact. Stable 7 mm nodule in the high left lung apex and  
other apical chronic architectural distortion. Dependent consolidation in the  
basilar left greater than right lower lobe. There is endobronchial debris  
present in the lower lungs. Hypodense splenic nodules are not significantly changed. Complex decision making was made in the evaluation and management plans during this consultation. More than 50% of time was spent in counseling and coordination of care including review of data and discussion with other team members.  
 
 
Shantell Short MD,[facility] 
 
 [facility] Pulmonary, Critical Care, and Sleep Medicine

## 2020-05-18 NOTE — ROUTINE PROCESS
Bedside and Verbal shift change report given to Sharmaine Moreno RN (oncoming nurse) by Verlee Peabody, RN  (offgoing nurse). Report included the following information SBAR, Kardex, Intake/Output, MAR, Recent Results, Med Rec Status and Cardiac Rhythm NSR.

## 2020-05-18 NOTE — PROGRESS NOTES
Reason for Admission:   Respiratory distress [R06.03] Hypoxia [R09.02] COPD exacerbation ([facility] [J44.1] HCAP (healthcare-associated pneumonia) [J18.9] Hypomagnesemia [E83.42] Hypokalemia [E87.6] History of drug abuse ([facility] [F19.11] RRAT Score:     60% Resources/supports as identified by patient/family:    
 
Top Challenges facing patient (as identified by patient/family and CM):     Pt reports none Finances/Medication cost?     No issues Transportation      stretcher Support system or lack thereof? Brother, friend, and personal care Living arrangements? Lives with friend Self-care/ADLs/Cognition? Pt needs assistance with ADLS Current Advanced Directive/Advance Care Plan:   no 
                       
Plan for utilizing home health:    yes, [address]  For [facility] Likelihood of readmission:   HIGH Transition of Care Plan:               
 
 
Initial assessment completed with patient. Cognitive status of patient: oriented to time, place, person and situation. Face sheet information confirmed:  yes. The patient designates Kimberli Blackwell, daughter to participate in her discharge plan and to receive any needed information. This patient lives in a single family home with patient and friend. Patient is not able to navigate steps as needed. Prior to hospitalization, patient was considered to be independent with ADLs/IADLS : no . If not independent,  patient needs assist with : dressing, bathing, food preparation, cooking, toileting and grooming Patient has a current ACP document on file: no The pt will need medci will be available to transport patient home upon discharge. The patient already has Kirstie Fields W/HAKAN, hospital bed and oxygen through [facility], and  medical equipment available in the home. Patient is currently active with home health. If active, agency name is [facility]. Patient has not stayed in a skilled nursing facility or rehab. This patient is on dialysis :no 
 
List of available Home Health agencies were provided and reviewed with the patient prior to discharge. Freedom of choice signed: yes, for [facility]. Currently, the discharge plan is Home with  Pat[facility] J[facility]. Pt gets personal care from [facility]; 10 hours a day and her brother and friend assist during the other times The patient states that she can obtain her medications from the pharmacy, and take her medications as directed. Patient's current insurance is medicaid. Care Management Interventions PCP Verified by CM: Yes Mode of Transport at Discharge: BLS Transition of Care Consult (CM Consult): Discharge Planning Current Support Network: Lives with Caregiver Confirm Follow Up Transport: Other (see comment)(medical transport) The Plan for Transition of Care is Related to the Following Treatment Goals : home health The Patient and/or Patient Representative was Provided with a Choice of Provider and Agrees with the Discharge Plan?: Yes Freedom of Choice List was Provided with Basic Dialogue that Supports the Patient's Individualized Plan of Care/Goals, Treatment Preferences and Shares the Quality Data Associated with the Providers?: Yes Discharge Location Discharge Placement: Home with home health DARIA [place], [place]- [phone]

## 2020-05-18 NOTE — ROUTINE PROCESS
Bedside shift change report given to JENNY Knowles (oncoming nurse) by Ny Plaza RN (offgoing nurse).  Report included the following information SBAR, Kardex, Intake/Output, Recent Results and Cardiac Rhythm NSR. '

## 2020-05-18 NOTE — PROGRESS NOTES
Hospitalist Progress Note Patient: Yohan Mancia Age: 62 y.o. : 1955 MR#: [id number] SSN: [id number] Date/Time: 2020 9:12 AM 
 
DOA: 2020 PCP: Chary Day NP Subjective:  
Overall feeling better. Still some SOB and cough. Patient remains on high flow oxygen and tolerates well. Otherwise, feels her breathing is better. But still coughing. No chest pain. No stomach pain. Interval Hospital Course: 
62 y.o female with polysubstance abuse, severe COPD with sarcoidosis on chronic prednisone, HTN, paroxysmal AFIB on Eliquis, recent hospitalization for perforated viscous, colostomy,  presented from home with worsened shortness of breath, cough. She normally needs 3 liters oxygen. Recently was enrolled in Methadone clinic but still using street drug. EMS found her to be respiratory failure required more oxygenation. In the ER, she was started on sepsis Rx. She was given Levafloxacin, Vancomycin, stress dose steroid was also givens since she is on chronic steroid at home. COVID-19 infection suspected. ROS: No current fever/chills, no headache, no dizziness, no facial pain, no sinus congestion, +cough No swallowing pain, No chest pain, no palpitation, ++ shortness of breath, no abd pain, No diarrhea, no urinary complaint, no leg pain or swelling Assessment/Plan: 1. Sepsis, poa, likely from pneumonia 2. Pneumonia, possible aspiration vs COVID-19 infection -  COVID negative -  CTA: no PE, emphysema with endobronchial debris, L>R consolidations, n 
3. Acute on chronic respiratory failure with hypoxia 4. Acute COPD exacerbation 5. Hypotension, possible associate with sepsis, hypovolemic, adrenal insufficiency: remains stable 6. Pulmonary HTN, moderate to severe 7. Sarcoidosis with chronic steroid 8. Polysubstance abuse with possible withdrawal symptom 9. Paroxysmal AFIB, in sinus: on Eliquis 10. Hypokalemia, Hypomagnesemia: resolved 11.  Hyponatremia 12. Elevated bili, AST 
 - US abd without distended, possible hepatic steatosis. 13.  Active tobacco smoking 14. Hypothyroidism 15. Hyperglycemia with DM2 
16. Hyperlipidemia 17. Sickle cell trait with anemia, iron deficiency anemia : H&H stable 18. Leukocytosis 19. Present of colostomy 20. Midline abd wound, no infection: wound care following 21. T6 compression fracture: noted on  CTA Patient has opioid withdrawal symptom, will resume her methadone at 20mg daily. Close monitor. D/c Vancomycin, Cefepime, flagyl.  
 - will start Doxycycline (multiple drug allergies and drug-drug interactions limiting choices) Blood cultures follow up 
sputum culture follow She remains on high flow. Can cont steroid, bronchodilator. Pulmonary to follow up. Cont Daliresp Continue Zoloft, Crestor, PPI, Synthroid, ASA, Amiodarone, Eliquis ISS,accuchecks,  Lantus Hgb/Hct monitor. Daily LDH, ferritin, d-dimer, CRP 
D/c azithromycin, and vanc PPI Full Code Discussed with Dr. Radha mendoza to take off droplet precautions and transfer to tele. Potentially can go home tomorrow if remains stable. Additional Notes:   
Time spent >30 minutes Case discussed with:  [x]Patient  []Family  [x]Nursing  [x]Case Management DVT Prophylaxis:  []Lovenox  []Hep SQ  [x]Eliquis  []Coumadin   []On Heparin gtt Signed By: Apryl Spivey MD   
 May 18, 2020 9:12 AM  
  
 
 
 
 
Objective:  
VS:  
Visit Vitals /70 (BP 1 Location: Right arm, BP Patient Position: At rest) Pulse 66 Temp 98.4 °F (36.9 °C) Resp 20 Ht 5' 2\" (1.575 m) Wt 74.2 kg (163 lb 9.6 oz) LMP 2012 (Exact Date) SpO2 99% BMI 29.92 kg/m² Tmax/24hrs: Temp (24hrs), Av.1 °F (36.7 °C), Min:97.1 °F (36.2 °C), Max:99.9 °F (37.7 °C) Intake/Output Summary (Last 24 hours) at 2020 1527 Last data filed at 2020 7622 Gross per 24 hour Intake 630 ml Output 325 ml Net 305 ml Tele: sinus General:  Cooperative, Not in acute distress, speaks in full sentence while in bed HEENT: PERRL, EOMI, supple neck, no JVD, dry oral mucosa Cardiovascular: S1S2 regular, no rub/gallop Pulmonary: air entry bilaterally, no wheezing, ++ crackle GI:  Soft, non tender, non distended, +bs, no guarding +midline wound no infection Extremities:  No pedal edema, +distal pulses appreciated Neuro: AOx3, moving all extremities, no gross deficit. Additional:  
 
 
Current Facility-Administered Medications Medication Dose Route Frequency  zinc oxide-vitamin b5-vit e (BALMEX) cream   Topical DAILY PRN  
 cefepime (MAXIPIME) 1 g in sterile water (preservative free) 10 mL IV syringe  1 g IntraVENous Q8H  
 lactobacillus sp. 50 billion cpu (BIO-K PLUS) capsule 1 Cap  1 Cap Oral DAILY  ondansetron (ZOFRAN) injection 4 mg  4 mg IntraVENous Q8H PRN  
 azithromycin (ZITHROMAX) 500 mg in  mL  500 mg IntraVENous Q24H  
 nicotine (NICODERM CQ) 21 mg/24 hr patch 1 Patch  1 Patch TransDERmal DAILY  budesonide (PULMICORT) 500 mcg/2 ml nebulizer suspension  500 mcg Nebulization BID RT  
 sodium chloride (NS) flush 5-10 mL  5-10 mL IntraVENous PRN  
 vancomycin (VANCOCIN) 1,000 mg in 0.9% sodium chloride (MBP/ADV) 250 mL adv  1,000 mg IntraVENous Q12H  hydrocortisone Sod Succ (PF) (SOLU-CORTEF) injection 50 mg  50 mg IntraVENous Q6H  
 acetaminophen (TYLENOL) tablet 650 mg  650 mg Oral Q4H PRN  
 amiodarone (CORDARONE) tablet 200 mg  200 mg Oral DAILY  apixaban (ELIQUIS) tablet 5 mg  5 mg Oral BID  ascorbic acid (vitamin C) (VITAMIN C) tablet 250 mg  250 mg Oral DAILY WITH BREAKFAST  aspirin chewable tablet 81 mg  81 mg Oral DAILY WITH BREAKFAST  insulin glargine (LANTUS) injection 10 Units  10 Units SubCUTAneous DAILY  levothyroxine (SYNTHROID) tablet 100 mcg  100 mcg Oral ACB  pantoprazole (PROTONIX) tablet 40 mg  40 mg Oral ACB&D  
  roflumilast (DALIRESP) tablet 250 mcg  250 mcg Oral DAILY  sertraline (ZOLOFT) tablet 50 mg  50 mg Oral DAILY  rosuvastatin (CRESTOR) tablet 5 mg  5 mg Oral QHS  glycopyrrolate-formoterol (BEVESPI AEROSPHERE) 9 mcg-4.8 mcg inhaler  2 Puff Inhalation BID  albuterol (PROVENTIL HFA, VENTOLIN HFA, PROAIR HFA) inhaler 1 Puff  1 Puff Inhalation Q4H RT  
 
  
 
 
Lab/Data Review: 
Labs: Results:  
   
Chemistry Recent Labs 05/18/20 0341 05/17/20 
0500 05/16/20 
1100 * 189* 171*  138 135* K 3.5 3.4* 3.2*  
 100 93* CO2 34* 33* 36* BUN 9 12 16 CREA 0.85 0.77 1.00 BUCR 11* 16 16 AGAP 3 5 6 CA 8.0* 7.5* 7.9*  
PHOS 2.0* 3.6 5.6* Recent Labs 05/18/20 0341 05/17/20 
0500 05/16/20 
1100 ALT 41 19 29 SGOT 76* 22 48*  
TP 6.2* 5.9* 7.8 ALB 2.6* 2.6* 3.1*  
GLOB 3.6 3.3 4.7* AGRAT 0.7* 0.8 0.7* CBC w/Diff Recent Labs 05/18/20 0341 05/17/20 2016 05/17/20 
0533 05/17/20 
0500 05/16/20 
1100 WBC 9.4  --   --  11.1 18.6*  
RBC 3.77*  --   --  3.59* 5.21  
HGB 9.4* 9.1* 8.7* 9.1* 13.4 HCT 31.5* 30.2* 28.1* 29.4* 43.7 MCV 83.6  --   --  81.9 83.9 MCH 24.9  --   --  25.3 25.7 MCHC 29.8*  --   --  31.0 30.7* RDW 17.6*  --   --  17.2* 17.4*  
  --   --  133* 163 GRANS 90*  --   --  91* 76* LYMPH 6*  --   --  6* 21 EOS 0  --   --  0 0 Coagulation Recent Labs 05/17/20 
0500 05/16/20 
1100 PTP 17.2* 16.9* INR 1.4* 1.4* APTT 36.8*  --   
   
Iron/Ferritin Lab Results Component Value Date/Time Iron 11 (L) 05/18/2020 03:41 AM  
 TIBC 283 05/18/2020 03:41 AM  
 Iron % saturation 4 (L) 05/18/2020 03:41 AM  
 Ferritin 88 05/18/2020 03:41 AM  
   
BNP Cardiac Enzymes Lab Results Component Value Date/Time  05/18/2020 03:41 AM  
 CK - MB 2.2 03/02/2020 02:20 PM  
 CK-MB Index 0.8 03/02/2020 02:20 PM  
 Troponin-I, QT 0.04 05/16/2020 11:00 AM  
 
  
Lactic Acid Thyroid Studies All Micro Results Procedure Component Value Units Date/Time CULTURE, URINE [[id number]]  (Abnormal) Collected:  05/16/20 1837 Order Status:  Completed Specimen:  Cath Urine Updated:  05/18/20 6969 Special Requests: NO SPECIAL REQUESTS [place] Count --     
  >100,000 COLONIES/mL Culture result: GRAM NEGATIVE RODS     
 CULTURE, BLOOD [[id number]] Collected:  05/16/20 1100 Order Status:  Completed Specimen:  Blood Updated:  05/18/20 5179 Special Requests: NO SPECIAL REQUESTS Culture result: NO GROWTH 2 DAYS     
 CULTURE, BLOOD [[id number]] Collected:  05/16/20 1140 Order Status:  Completed Specimen:  Blood Updated:  05/18/20 2917 Special Requests: NO SPECIAL REQUESTS Culture result: NO GROWTH 2 DAYS     
 RESPIRATORY PANEL,PCR,NASOPHARYNGEAL [[id number]] Collected:  05/16/20 2045 Order Status:  Completed Specimen:  NASOPHARYNGEAL SWAB Updated:  05/16/20 2103 Images: 
 
CT (Most Recent). CT Results (most recent): 
Results from [facility] [place] Encounter encounter on 05/16/20 CTA CHEST W OR W WO CONT Narrative CTA CHEST PULMONARY EMBOLISM PROTOCOL INDICATION: Difficulty breathing. Respiratory distress. Question pulmonary 
embolism. TECHNIQUE: Thin collimation axial images obtained through the level of the 
pulmonary arteries with additional imaging through the chest following the 
uneventful administration of nonionic intravenous contrast.  Images 
reconstructed into MIP coronal and sagittal projections for complete evaluation 
of the tortuous and overlapping pulmonary vascular structures and to reduce 
patient radiation dose. All CT scans are performed using dose optimization 
techniques as appropriate to the performed exam including the following: Automated exposure control, adjustment of mA and/or kV according to patient 
size, and use of iterative reconstructive technique. COMPARISON: 2/29/2020 FINDINGS: 
 
 No filling defects are appreciated within the main, left, right, lobar or 
visualized segmental pulmonary arteries to suggest embolism. Peripheral 
subsegmental pulmonary artery branches are motion limited. The main pulmonary 
artery is enlarged measuring 3.6 cm. The thoracic aorta is not aneurysmal.  No 
evidence for dissection. No pericardial effusion. No pleural effusion. No enlarged axillary, hilar, or 
mediastinal lymphadenopathy. Degenerative changes in the shoulders and spine. Thoracic fusion hardware. Chronic lower right rib fracture deformities. Chronic T5, T6, T9, L2 vertebral compression deformities. The T6 compression fracture 
site is new from the prior CT exam, but is otherwise age-indeterminate. Emphysema. Motion artifact. Stable 7 mm nodule in the high left lung apex and 
other apical chronic architectural distortion. Dependent consolidation in the 
basilar left greater than right lower lobe. There is endobronchial debris 
present in the lower lungs. Hypodense splenic nodules are not significantly changed. Impression IMPRESSION: 
 
1. No evidence of pulmonary embolism, aortic aneurysm, or dissection. Peripheral 
branches are motion limited. 2. Emphysema with endobronchial debris. Left greater than right basilar lung 
consolidations could represent regions of atelectasis versus pneumonia or other 
acute airspace process. 3. New T6 compression fracture. Multiple other similar chronic compression 
deformities. 4. Stable nodule or scarring at the left lung apex. This is stable since at 
least 2016 not requiring follow-up per current Fleischner guidelines. 5. Enlarged main pulmonary artery may indicate pulmonary arterial hypertension. 6. Chronic stable splenic nodules. Refer to prior reports. XRAY (Most Recent) EKG No results found for this or any previous visit. 2D ECHO

## 2020-05-19 NOTE — PROGRESS NOTES
Pt states she takes methadone and would like to speak with the physician regarding this matter. Also, pt hyperglycemic this a.m., bs 298,  with no sliding scale coverage at this time. Lantus 10 units given per MAR.

## 2020-05-19 NOTE — PROGRESS NOTES
Discharge instructions given, signature obtained. Pt left unit via [facility] transport. Pt was in no distress with stable vital signs.

## 2020-05-19 NOTE — PROGRESS NOTES
[facility] Pulmonary, Critical Care, and Sleep Medicine Name: Haider Omalley MRN: [id number] : 1955 Hospital: [facility] Date: 2020 Pulmonary Medicine -Progress note IMPRESSION:  
· Respiratory Failure- acute on chronic-hypoxic and hypercapnic- On 3LPM at home at baseline · Pneumonia: CAP, Aspiration, HAP. Bibasilar atelectasis with suspected mucous retention and inability to clear after her recent prolonged complicated abdominal surgery. CT scan with findings of Emphysema with endobronchial debris. Left greater than right basilar lung and consolidation at bases with airspace process further complicated by new T6 compression fracture as well as multiple other chronic compressions · COVID negative · COPD with possible component of acute exacerbation- No recent Fev1 · Transaminitis · Sarcoidosis- chronic · Hypotension: Upon admission - responded to IVF- etiology could be due to: dehydration, sepsis and/or adrenal insufficiency- On and off steroids · Pulmonary Hypertension- chronic · Heroine use- acute on chronic with signs of withdrawal today- endorses last snorting Heroin on evening of 5/15/2020. Intermittently on methadone · PAFIB- sinus on telemetry · Colostomy- left side. S/P Ex-Lap: 20- perforated bowel, sigmoid colectomy with drainage of multiple stool and abscess collections · Active tobacco smoker · DM 
· Hypothyroidism- On synthroid and Amiodarone- TSH normal 20 · HLD · Chronic pain- limited ROMS- ambulates via wheelchair, Thoracic level rods present, multiple compression fractures- most recently noted T6. 
· Anemia- Sickle Cell trait with iron deficiency- no signs of acute bleeding at this time · Splenic nodules- stable per recent CT 
· Obesity · H/O S. Epi bacteremia- early march- sensitive to Vanco 
· H/O E. Coli URI: - Levaquin Resistant · Poor Dentition RECOMMENDATIONS:  
 · Keep HOB elevated; Maintain SpO2 90-94% · Pulmonary hygiene with IS at bedside · Continue mucolytic's and intensify bronchial hygiene · Continue Eliquis- high risk for VTE in the clinical setting and comorbidities · Nicotine Patch · Antibiotics Doxycycline-discussed with Dr. Chaidez Presume · Continue Daliresp · Continue with Bevespi (LAMA-LABA) 2 puffs BID- monitor heart rate · Continue nebulized Pulmicort till discharge · Add Spacer for MDIs · Albuterol 2 puffs Q 4 hours- monitor heart rate · Continue protonix · Will start prednisone 20 mg x 5 days · Colostomy care per nursing · Will continue to follow. Due to complexity of chronic medical conditions- patient is at high risk for clinical decompensation. · Further recommendations and therapies pending clinical course and results of pending studies Subjective/History: This patient has been seen and evaluated at the request of Dr. Andrei Baez for Acute on Chronic Hypoxic Respiratory Failure. 05/19/20 States she feels significantly improved Still has a cough and now able to expectorate without difficulty-clear mucus Asking to go home since she is breathing significantly better Denies fever chills Complains of back pain when sits upright. COVID testing resulted negative HPI Patient is a 62 y.o. female who presented to [facility] ED for increasing respiratory distress, tachypnea  and hypotension. Patient has a complex medical history to include: COPD, pulmonary hypertension, hypoxic respiratory failure, tobacco smoker, substance abuse, chronic pain and most recently a hospitalization in Feb/march for perforated bowel requiring sigmoid colectomy and left sided colostomy. At time of my evaluation today, the patient was in ED-bed 20. She is mildly diaphoretic and is shaking her legs which she states is characteristic for her Opioid withdrawal symptoms. She reports snoring heroin last Friday night and she has also been taking methadone. She continues to smoker cigarettes. She reports that about 5 days ago she had 2 days of coughing up dark- brown sputum. Sputum per her report today is now clearer. She denies hemoptysis. No known bouts of aspiration or chocking. Her Colostomy is full of firm brown stool- no reports of blood from ostomy. She reports proper care of her ostomy. She reports limited ROM and can stand with some assistance. She has a non- motorized wheelchair which she has been using for mobility Past Medical History:  
Diagnosis Date  Abnormally low high density lipoprotein (HDL) cholesterol with hypertriglyceridemia Lipid profile (11/6/2016) showed , , HDL 38, LDL 37  Acute blood loss as cause of postoperative anemia 12/12/2019  Anxiety  Bipolar affective disorder ([address] 12/5/2012  Cellulitis of right forearm 05/04/2017  Chronic back pain  Chronic obstructive pulmonary disease (COPD) ([address] SOB, on [place] O2 Recent admission with psychosis  Chronic respiratory failure with hypoxia ([address] On home oxygen inhalation at 3 LPM via NC  
 Contusion of left elbow 5/4/2017  Depression  Diabetic neuropathy associated with type 2 diabetes mellitus ([address]  Diastolic dysfunction without heart failure Stable on diuretics  Falls frequently  Gastroesophageal reflux disease with hiatal hernia  Generalized osteoarthritis of multiple sites  Gout On Allopurinol  History of acute renal failure 5/31/2013  History of back injury   
 jumped out of second story window  History of fracture due to fall 12/11/2019  
 History of hepatitis C   
 treated  History of penicillin allergy  History of vitamin D deficiency 5/10/2017  Hypertensive heart disease without heart failure Better controlled  Hyperuricemia 5/26/2017  Hypothyroidism  Hypoxemia requiring supplemental oxygen 12/29/2014  Intravenous drug user 5/2/2017  Long term current use of aspirin  Memory difficulty  Menopause  Mixed connective tissue disease ([facility] 5/10/2017  Obesity, Class I, BMI 30-34.9  Olecranon bursitis of right elbow 2017  Opioid dependence ([facility] On Methadone  Recurrent genital herpes 2013  Right Achilles tendinitis 2017  Sarcoidosis  Sickle cell trait ([facility]  Tobacco use disorder  Type 2 diabetes mellitus with diabetic neuropathy, with long-term current use of insulin ([facility] HbA1c (2019) = 7.1  Urge urinary incontinence 5/10/2017  Venous insufficiency  Wears glasses Past Surgical History:  
[facility]  SECTION    
 HX CYST REMOVAL Left  Left foot  HX OTHER SURGICAL Left 2017 S/P incision and drainage of the abscess of the left hand and the left foot (2017 - Dr. Da Moncada. Marylee Aliment)  HX THORACIC LAMINECTOMY  2019 S/P T10, T9, T8 laminectomy; T7, T8, T9, T10, T11, T12 posterior thoracic fusion; segmental spinal instrumentation; K2M Davis type, T7-T8, T11-T12 (2019 - Dr. Avery Partida)  HX TUBAL LIGATION Current Facility-Administered Medications Medication Dose Route Frequency  nitrofurantoin (MACRODANTIN) capsule 100 mg  100 mg Oral Q6H  
 predniSONE (DELTASONE) tablet 20 mg  20 mg Oral DAILY WITH BREAKFAST  doxycycline (VIBRAMYCIN) capsule 100 mg  100 mg Oral Q12H  
 lactobacillus sp. 50 billion cpu (BIO-K PLUS) capsule 1 Cap  1 Cap Oral DAILY  nicotine (NICODERM CQ) 21 mg/24 hr patch 1 Patch  1 Patch TransDERmal DAILY  budesonide (PULMICORT) 500 mcg/2 ml nebulizer suspension  500 mcg Nebulization BID RT  
 amiodarone (CORDARONE) tablet 200 mg  200 mg Oral DAILY  apixaban (ELIQUIS) tablet 5 mg  5 mg Oral BID  ascorbic acid (vitamin C) (VITAMIN C) tablet 250 mg  250 mg Oral DAILY WITH BREAKFAST  aspirin chewable tablet 81 mg  81 mg Oral DAILY WITH BREAKFAST  insulin glargine (LANTUS) injection 10 Units  10 Units SubCUTAneous DAILY  levothyroxine (SYNTHROID) tablet 100 mcg  100 mcg Oral ACB  pantoprazole (PROTONIX) tablet 40 mg  40 mg Oral ACB&D  
 roflumilast (DALIRESP) tablet 250 mcg  250 mcg Oral DAILY  sertraline (ZOLOFT) tablet 50 mg  50 mg Oral DAILY  rosuvastatin (CRESTOR) tablet 5 mg  5 mg Oral QHS  glycopyrrolate-formoterol (BEVESPI AEROSPHERE) 9 mcg-4.8 mcg inhaler  2 Puff Inhalation BID  albuterol (PROVENTIL HFA, VENTOLIN HFA, PROAIR HFA) inhaler 1 Puff  1 Puff Inhalation Q4H RT Allergies Allergen Reactions  Moxifloxacin Rash  Pcn [Penicillins] Swelling  Sulfa (Sulfonamide Antibiotics) Swelling Review of Systems: 
Pertinent items are noted in HPI. Objective:  
Vital Signs:   
Visit Vitals /71 (BP 1 Location: Right arm, BP Patient Position: At rest) Pulse 65 Temp 97.9 °F (36.6 °C) Resp 20 Ht 5' 2\" (1.575 m) Wt 74.2 kg (163 lb 9.6 oz) LMP 2012 (Exact Date) SpO2 98% BMI 29.92 kg/m² O2 Device: Nasal cannula O2 Flow Rate (L/min): 4 l/min Temp (24hrs), Av °F (36.7 °C), Min:97.5 °F (36.4 °C), Max:98.6 °F (37 °C) Intake/Output:  
Last shift:      No intake/output data recorded. Last 3 shifts:  1901 -  0700 In: 890 [I.V.:890] Out: 325 [Urine:325] Intake/Output Summary (Last 24 hours) at 2020 1109 Last data filed at 2020 1821 Gross per 24 hour Intake 260 ml Output  Net 260 ml Physical Exam: 
 
General:  Alert, cooperative, mild distress, sitting up in bed in ED Head:  Normocephalic, without obvious abnormality, atraumatic. Eyes:  Conjunctivae/corneas clear. PERRL, EOMs intact. Nose: Nares normal. Septum midline. Mucosa normal. No drainage or sinus tenderness. Throat: Lips, mucosa, and tongue normal. - Poor dentition- no oral exudates Neck: Supple, symmetrical, trachea midline, no adenopathy, thyroid: no enlargment/tenderness/nodules, no JVD. Back:   + Kyphosis, limited ROM Lungs:   No Auscultation, with NC in place, bibasilar coarse breath sounds with rhonchi no audible wheezing or stridor, mild accessory muscle use Chest wall:  No tenderness - some increase in A-P diameter and skeletal chest wall asymetry. Heart:  Pulse- rapid and regular- No auscultation Abdomen:   Soft, non-tender. No masses,  No organomegaly. Mild line incision- healed- left sided colostomy- full of form stool Extremities: Extremities normal, atraumatic, no cyanosis or edema. - limited ROM with some chronic changes Pulses: 2+ and symmetric all extremities. Skin: Skin color, texture, turgor normal. No rashes or lesions Lymph nodes: 
 
  Cervical, supraclavicular nodes are unremarkable Neurologic: Limited ROM, alert and oriented, deconditioning Data:  
 
 
Lab Results Component Value Date/Time C-Reactive protein 1.9 (H) 05/19/2020 05:21 AM  
 
Lab Results Component Value Date/Time D DIMER 0.96 (H) 05/19/2020 05:21 AM  
 
Lab Results Component Value Date/Time Ferritin 81 05/19/2020 05:21 AM  
 
  
 
Recent Results (from the past 24 hour(s)) GLUCOSE, POC Collection Time: 05/18/20  5:00 PM  
Result Value Ref Range Glucose (POC) 312 (H) 70 - 110 mg/dL GLUCOSE, POC Collection Time: 05/18/20  9:32 PM  
Result Value Ref Range Glucose (POC) 354 (H) 70 - 110 mg/dL CBC WITH AUTOMATED DIFF Collection Time: 05/19/20  5:21 AM  
Result Value Ref Range WBC 9.9 4.6 - 13.2 K/uL  
 RBC 3.98 (L) 4.20 - 5.30 M/uL  
 HGB 10.0 (L) 12.0 - 16.0 g/dL HCT 33.1 (L) 35.0 - 45.0 % MCV 83.2 74.0 - 97.0 FL  
 MCH 25.1 24.0 - 34.0 PG  
 MCHC 30.2 (L) 31.0 - 37.0 g/dL  
 RDW 17.3 (H) 11.6 - 14.5 % PLATELET 735 504 - 516 K/uL MPV 9.4 9.2 - 11.8 FL  
 NEUTROPHILS 85 (H) 40 - 73 % LYMPHOCYTES 8 (L) 21 - 52 % MONOCYTES 7 3 - 10 % EOSINOPHILS 0 0 - 5 % BASOPHILS 0 0 - 2 %  
 ABS. NEUTROPHILS 8.5 (H) 1.8 - 8.0 K/UL  
 ABS. LYMPHOCYTES 0.8 (L) 0.9 - 3.6 K/UL  
 ABS. MONOCYTES 0.7 0.05 - 1.2 K/UL  
 ABS. EOSINOPHILS 0.0 0.0 - 0.4 K/UL  
 ABS. BASOPHILS 0.0 0.0 - 0.1 K/UL  
 DF AUTOMATED MAGNESIUM Collection Time: 05/19/20  5:21 AM  
Result Value Ref Range Magnesium 2.1 1.6 - 2.6 mg/dL METABOLIC PANEL, BASIC Collection Time: 05/19/20  5:21 AM  
Result Value Ref Range Sodium 138 136 - 145 mmol/L Potassium 3.4 (L) 3.5 - 5.5 mmol/L Chloride 101 100 - 111 mmol/L  
 CO2 36 (H) 21 - 32 mmol/L Anion gap 1 (L) 3.0 - 18 mmol/L Glucose 311 (H) 74 - 99 mg/dL BUN 8 7.0 - 18 MG/DL Creatinine 0.75 0.6 - 1.3 MG/DL  
 BUN/Creatinine ratio 11 (L) 12 - 20 GFR est AA >60 >60 ml/min/1.73m2 GFR est non-AA >60 >60 ml/min/1.73m2 Calcium 8.6 8.5 - 10.1 MG/DL  
LD Collection Time: 05/19/20  5:21 AM  
Result Value Ref Range  (H) 81 - 234 U/L FERRITIN Collection Time: 05/19/20  5:21 AM  
Result Value Ref Range Ferritin 81 8 - 388 NG/ML  
D DIMER Collection Time: 05/19/20  5:21 AM  
Result Value Ref Range D DIMER 0.96 (H) <0.46 ug/ml(FEU) C REACTIVE PROTEIN, QT Collection Time: 05/19/20  5:21 AM  
Result Value Ref Range C-Reactive protein 1.9 (H) 0 - 0.3 mg/dL GLUCOSE, POC Collection Time: 05/19/20  8:05 AM  
Result Value Ref Range Glucose (POC) 296 (H) 70 - 110 mg/dL Recent Labs 05/16/20 
1153 HCO3I 38.8* PCO2I 56.0*  
PHI 7.448 PO2I 56* Imaging: 
I have personally reviewed the patients radiographs and have reviewed the reports: CXR Results  (Last 48 hours) None CT Results  (Last 48 hours) 05/16/20 1605  CTA 114[facility] Final result Impression:  IMPRESSION:  
   
1. No evidence of pulmonary embolism, aortic aneurysm, or dissection. Peripheral  
branches are motion limited. 2. Emphysema with endobronchial debris. Left greater than right basilar lung  
consolidations could represent regions of atelectasis versus pneumonia or other  
acute airspace process. 3. New T6 compression fracture. Multiple other similar chronic compression  
deformities. 4. Stable nodule or scarring at the left lung apex. This is stable since at  
least 2016 not requiring follow-up per current Fleischner guidelines. 5. Enlarged main pulmonary artery may indicate pulmonary arterial hypertension. 6. Chronic stable splenic nodules. Refer to prior reports. Narrative:  CTA CHEST PULMONARY EMBOLISM PROTOCOL INDICATION: Difficulty breathing. Respiratory distress. Question pulmonary  
embolism. TECHNIQUE: Thin collimation axial images obtained through the level of the  
pulmonary arteries with additional imaging through the chest following the  
uneventful administration of nonionic intravenous contrast.  Images  
reconstructed into MIP coronal and sagittal projections for complete evaluation  
of the tortuous and overlapping pulmonary vascular structures and to reduce  
patient radiation dose. All CT scans are performed using dose optimization  
techniques as appropriate to the performed exam including the following: Automated exposure control, adjustment of mA and/or kV according to patient  
size, and use of iterative reconstructive technique. COMPARISON: 2/29/2020 FINDINGS:  
   
No filling defects are appreciated within the main, left, right, lobar or  
visualized segmental pulmonary arteries to suggest embolism. Peripheral  
subsegmental pulmonary artery branches are motion limited. The main pulmonary  
artery is enlarged measuring 3.6 cm. The thoracic aorta is not aneurysmal.  No  
evidence for dissection. No pericardial effusion. No pleural effusion. No enlarged axillary, hilar, or  
mediastinal lymphadenopathy. Degenerative changes in the shoulders and spine. Thoracic fusion hardware. Chronic lower right rib fracture deformities. Chronic T5, T6, T9, L2 vertebral compression deformities. The T6 compression fracture  
site is new from the prior CT exam, but is otherwise age-indeterminate. Emphysema. Motion artifact. Stable 7 mm nodule in the high left lung apex and  
other apical chronic architectural distortion. Dependent consolidation in the  
basilar left greater than right lower lobe. There is endobronchial debris  
present in the lower lungs. Hypodense splenic nodules are not significantly changed. Complex decision making was made in the evaluation and management plans during this consultation. More than 50% of time was spent in counseling and coordination of care including review of data and discussion with other team members. Orest Lanes MD,[address] [facility] Pulmonary, Critical Care, and Sleep Medicine

## 2020-05-19 NOTE — PROGRESS NOTES
Called Masood Renteria (Medicaid Transport) @ [phone] and spoke to Roma. Gave authorization to contact [address] [place] [address] ID# [id number] Called [facility] - Scheduled stretcher transport to: 
Patient's home [address] [facility], [facility]  Pickup time: [address] [facility] Informed Cheryl of transportation arrangement.

## 2020-05-19 NOTE — HOME CARE
Patient is Active with [facility], Discharge orders noted for today, [facility] will resume care for SN,PT,OT , pt states she has a hosp bed,home O2,W/C and walker,she states she has PCA with [facility] 10hrs/day ,7days/week ; [facility] will follow , [facility] referral updated with new [facility] orders. OLIVIER SAWYER.

## 2020-05-19 NOTE — DISCHARGE INSTRUCTIONS
Discharge Instructions    Patient: Melida Ramos MRN: [id number]  CSN: [id number]    YOB: 1955  Age: 62 y.o. Sex: female    DOA: 5/16/2020 LOS:  LOS: 3 days   Discharge Date:      DIET:  Cardiac Diet and Diabetic Diet    ACTIVITY: Activity as tolerated  Home health care for Skilled care for COPD, DM, Hypertension and medication management    ·    PT/OT consult      ADDITIONAL INFORMATION: If you experience any of the following symptoms but not limited to Fever, chills, nausea, vomiting, diarrhea, change in mentation, falling, bleeding, shortness of breath, chest pain, please call your primary care physician or return to the emergency room if you cannot get hold of your doctor:     FOLLOW UP CARE:  Dr. Lena Winslow NP in 5-7 days. Please call and set up an appointment.   Dr. Rama Richard in 2 weeks      Amanda Fernando MD  5/19/2020 1:04 PM

## 2020-05-19 NOTE — PROGRESS NOTES
NUTRITION Nursing Referral: Pressure Injury RECOMMENDATIONS / PLAN:  
 
- Add DM diet restriction with no concentrated sweets. - Monitor and encourage po intake as tolerated. - Continue RD inpatient monitoring and evaluation. NUTRITION INTERVENTIONS & DIAGNOSIS:  
 
- Meals/snacks: modified composition Nutrition Diagnosis: Altered nutrition related laboratory values related to DM as evidenced by pt with hyperglycemia ASSESSMENT:  
 
Unable to reach pt via telephone for remote assessment. COVID-19 rule our pending this am, resulted negative and plan for discharge. Referral received for pressure injury. Meal intake unknown at this time, predicted good intake. Hyperglycemia noted on steroid therapy with hx of DM. Nutritional intake adequate to meet patients estimated nutritional needs:  Unable to determine at this time Diet: DIET CARDIAC Regular Food Allergies: NKFA Current Appetite: Unknown Appetite/meal intake prior to admission: Unknown Feeding Limitations:  [] Swallowing difficulty    [] Chewing difficulty    [] Other: 
Current Meal Intake: No data found. BM: 5/18- colostomy Skin Integrity: surgical incision to abdomen, stage 2 pressure injury to sacral 
Edema:   [x] No     [] Yes Pertinent Medications: Reviewed: ascorbic acid, lantus (10 units), lactobacillus, levothyroxine, prn ondansetron, pantoprazole, steroid Recent Labs 05/19/20 
0521 05/18/20 
0341 05/17/20 
0500  139 138  
K 3.4* 3.5 3.4*  
 102 100 CO2 36* 34* 33* * 356* 189* BUN 8 9 12 CREA 0.75 0.85 0.77 CA 8.6 8.0* 7.5* MG 2.1 2.3 2.5 PHOS  --  2.0* 3.6 ALB  --  2.6* 2.6* SGOT  --  76* 22 ALT  --  41 19 Intake/Output Summary (Last 24 hours) at 5/19/2020 1310 Last data filed at 5/18/2020 1821 Gross per 24 hour Intake 260 ml Output  Net 260 ml Anthropometrics: 
Ht Readings from Last 1 Encounters:  
05/16/20 5' 2\" (1.575 m) Last 3 Recorded Weights in this Encounter 05/16/20 1102 05/18/20 2023 Weight: 74.8 kg (165 lb) 74.2 kg (163 lb 9.6 oz) Body mass index is 29.92 kg/m². Weight History: Per previous RD noted pt reported UBW in March 2020 around 175#.   -17#, 9.4% x 6 months PTA per chart Weight Metrics 5/18/2020 5/7/2020 4/8/2020 3/22/2020 2/28/2020 1/22/2020 1/17/2020 Weight 163 lb 9.6 oz 165 lb 165 lb 165 lb - 171 lb 11.2 oz -  
BMI 29.92 kg/m2 28.32 kg/m2 28.32 kg/m2 28.32 kg/m2 28.57 kg/m2 - 28.57 kg/m2 Some encounter information is confidential and restricted. Go to Review Flowsheets activity to see all data. Admitting Diagnosis: Respiratory distress [R06.03] Hypoxia [R09.02] COPD exacerbation ([facility] [J44.1] HCAP (healthcare-associated pneumonia) [J18.9] Hypomagnesemia [E83.42] Hypokalemia [E87.6] History of drug abuse ([facility] [F19.11] Pertinent PMHx: COPD, HTN, DM, hepatitis C, sarcoidosis, methadone use, GERD with hiatal hernia, hypothyroidism Education Needs:        [x] None identified  [] Identified - Not appropriate at this time  []  Identified and addressed - refer to education log Learning Limitations:   [x] None identified  [] Identified Cultural, Mandaeism & ethnic food preferences:  [x] None identified    [] Identified and addressed ESTIMATED NUTRITION NEEDS:  
 
Calories: 8769-0809 kcal (MSJx1.2-1.3) based on  [x] Actual BW: 74 kg      [] IBW Protein:  gm (1.2-1.5 gm/kg) based on  [x] Actual BW      [] IBW 
CHO: 169-187 gm/day (45%-50%) Fluid: 1 mL/kcal 
  
MONITORING & EVALUATION:  
 
Nutrition Goal(s):  
- PO nutrition intake will meet >75% of patient estimated nutritional needs within the next 7 days. Outcome: New/Initial goal   
- Blood glucose will be within target range of  mg/dL within the next 5-7 days.   Outcome: New/Initial goal   
 
Monitoring:   [x] Food and nutrient intake   [x] Food and nutrient administration  [x] Comparative standards   [x] Nutrition-focused physical findings   [x] Anthropometric Measurements   [x] Treatment/therapy   [x] Biochemical data, medical tests, and procedures Previous Recommendations (for follow-up assessments only):  Not Applicable Discharge Planning: No nutritional discharge needs at this time. Participated in care planning, discharge planning, & interdisciplinary rounds as appropriate. Anastasiia Samuel RD Pager: [phone]

## 2020-05-19 NOTE — PROGRESS NOTES
Discharge noted for today. Have requested BLS transportation from [facility], [address] [place] [address] for 3pm.  Patient will return home, [facility]. Daughter, Jam Villareal, aware of d/c, patient will call daughter when transport arrives so daughter can let patient into home. Dow Rubinstein, MSW Case Management [phone]

## 2020-05-19 NOTE — CDMP QUERY
Patient admitted with sepsis, noted to have E. Coli in urine culture. If possible, please document in progress notes and d/c summary if you are evaluating and/or treating any of the following and identify the organism, if able to do so: 
 
? Clinical findings not significant 
? UTI, specify organism:   
? bacteriuria 
? Other, please specify ? Unable to Determine The medical record reflects the following: 
  Risk Factors: sepsis, pneumonia Clinical Indicators:  
>100,000 colonies/ml of E. Coli per urine culture results, 5/16/2020 
resistent to ciprofloxacin and levofloxacin Treatment:  
Doxycycline 100 mg po q12h start 5/18/2020 x14 doses Cefepime 1g IV q8h start 5/17/2020 @ 1106 stop 5/18/2020 @ 1543 Azithromycin 500mg IV q24h start 5/17/2020 1707 stop 5/18/2020 1543 Vancomycin 1,000mg IV q12h start 5/17/2020 0000 stop 5/18/2020 1543 Thank you, BREA Traylor RN, [email or web address] [email or web address] 
Cell # for Chloé Gibbons RN, CDS supervisor:JENNIFER  [phone]

## 2020-05-19 NOTE — DISCHARGE SUMMARY
Discharge Summary Patient: Drake Rodney MRN: [id number]  CSN: [id number] YOB: 1955  Age: 62 y.o. Sex: female DOA: 5/16/2020 LOS:  LOS: 3 days Disposition: Home with An Iyer Discharge Date: 5/19/2020 Admission Diagnoses: Respiratory distress [R06.03] Hypoxia [R09.02] COPD exacerbation ([facility] [address] [J44.1] HCAP (healthcare-associated pneumonia) [J18.9] Hypomagnesemia [E83.42] Hypokalemia [E87.6] History of drug abuse ([facility] [address] [F19.11] Discharge Diagnoses: 1. Sepsis, poa, likely from pneumonia, resolved 2. Pneumonia, COVID negative 3. UTI with E. coli 3. Acute on chronic respiratory failure with hypoxia 4. Acute COPD exacerbation 5. Hypotension, better 6. Pulmonary HTN, moderate to severe 7. Sarcoidosis with chronic steroid 8. Polysubstance abuse with possible withdrawal symptom 9. Paroxysmal AFIB, in sinus: on Eliquis 10. Elevated bili, AST 11. Active tobacco smoking 12. Hypothyroidism 13. Hyperglycemia with DM2 
14. Hyperlipidemia 15. Sickle cell trait with anemia, iron deficiency anemia : H&H stable 16. Midline abd wound, no infection: wound care following 17. T6 compression fracture: noted on 5/16 CTA Discharge Condition: Stable PHYSICAL EXAM 
Visit Vitals /75 (BP 1 Location: Right arm, BP Patient Position: At rest) Pulse 76 Temp 98.5 °F (36.9 °C) Resp 18 Ht 5' 2\" (1.575 m) Wt 74.2 kg (163 lb 9.6 oz) SpO2 98% BMI 29.92 kg/m² General: Alert, cooperative, no acute distress HEENT: PERRLA, EOMI. Anicteric sclerae. Lungs:  Bilateral coarse breath sounds present, No Wheezing/Rales. Heart:             Regular rate and Rhythm. Abdomen: Soft, Non distended, Non tender. + Bowel sounds. Extremities: No edema. Psych:   Good insight. Not anxious or agitated. Neurologic:  AA, oriented X 3. Moves all ext Hospital Course: Dave Orellana is a 62 y.o. female w/ PMH of polysubstance abuse, spinal surgery, recent admission for septic shock from perforated viscous, COPD, sarcoidosis, chronic hypoxia 3L NC, and CHF presenting to the [facility] for c/o trouble breathing. Patient in the emergency room was noted to have acute on chronic hypoxic respiratory failure, sepsis and sleep exacerbation. Patient was admitted to hospital was started on IV steroids, bronchodilators and pulmonary was consulted. Patient initially required high flow O2 but it was slowly weaned off. COVID 19 testing was done and was negative. Patient responded well with IV steroids and bronchodilators. Pulmonary saw the patient and recommended to slowly taper off IV steroids. Pancultures were obtained, blood cultures remain negative. Urine cultures growing E. coli and has been started on Macrobid. In view of her pneumonia, patient received empiric antibiotics and then later on switched to doxycycline given her multiple allergies. Pulmonary okay with antibiotic regimen. Discussed with pulmonary today Dr. Bebe Arredondo, recommended okay for discharge. Patient has been on p.o. steroids at home 30 mg per her primary pulmonologist Dr. Letty Dove. Patient will be continued on p.o. steroids. Patient is feeling a lot better, she is back to her baseline. She is hemodynamic medically stable for discharge. Discussed with the patient and also with her daughter over the phone about the discharge plan, follow-up appointments, test results and new medications with side effects. Patient and daughter completely understood and agree with plan. Also consulted questions and concerns appropriate. Procedures: None Consults:  
Dr. Bebe Arredondo pulmonary Imaging studies:  
CT Results (most recent): 
Results from Hospital Encounter encounter on 05/16/20 CTA CHEST W OR W WO CONT Narrative CTA CHEST PULMONARY EMBOLISM PROTOCOL INDICATION: Difficulty breathing. Respiratory distress. Question pulmonary 
embolism. TECHNIQUE: Thin collimation axial images obtained through the level of the 
pulmonary arteries with additional imaging through the chest following the 
uneventful administration of nonionic intravenous contrast.  Images 
reconstructed into MIP coronal and sagittal projections for complete evaluation 
of the tortuous and overlapping pulmonary vascular structures and to reduce 
patient radiation dose. All CT scans are performed using dose optimization 
techniques as appropriate to the performed exam including the following: Automated exposure control, adjustment of mA and/or kV according to patient 
size, and use of iterative reconstructive technique. COMPARISON: 2/29/2020 FINDINGS: 
 
No filling defects are appreciated within the main, left, right, lobar or 
visualized segmental pulmonary arteries to suggest embolism. Peripheral 
subsegmental pulmonary artery branches are motion limited. The main pulmonary 
artery is enlarged measuring 3.6 cm. The thoracic aorta is not aneurysmal.  No 
evidence for dissection. No pericardial effusion. No pleural effusion. No enlarged axillary, hilar, or 
mediastinal lymphadenopathy. Degenerative changes in the shoulders and spine. Thoracic fusion hardware. Chronic lower right rib fracture deformities. Chronic T5, T6, T9, L2 vertebral compression deformities. The T6 compression fracture 
site is new from the prior CT exam, but is otherwise age-indeterminate. Emphysema. Motion artifact. Stable 7 mm nodule in the high left lung apex and 
other apical chronic architectural distortion. Dependent consolidation in the 
basilar left greater than right lower lobe. There is endobronchial debris 
present in the lower lungs. Hypodense splenic nodules are not significantly changed. Impression IMPRESSION: 
 
1. No evidence of pulmonary embolism, aortic aneurysm, or dissection. Peripheral 
branches are motion limited. 2. Emphysema with endobronchial debris. Left greater than right basilar lung 
consolidations could represent regions of atelectasis versus pneumonia or other 
acute airspace process. 3. New T6 compression fracture. Multiple other similar chronic compression 
deformities. 4. Stable nodule or scarring at the left lung apex. This is stable since at 
least 2016 not requiring follow-up per current Fleischner guidelines. 5. Enlarged main pulmonary artery may indicate pulmonary arterial hypertension. 6. Chronic stable splenic nodules. Refer to prior reports. Discharge Medications:    
Current Discharge Medication List  
  
START taking these medications Details  
doxycycline (VIBRAMYCIN) 100 mg capsule Take 1 Cap by mouth every twelve (12) hours for 5 days. Qty: 10 Cap, Refills: 0  
  
guaiFENesin-dextromethorphan (ROBITUSSIN DM) 100-10 mg/5 mL syrup Take 10 mL by mouth every six (6) hours as needed for Cough or Congestion for up to 10 days. Qty: 100 mL, Refills: 0  
  
lactobacillus sp. 50 billion cpu (BIO-K PLUS) 50 billion cell -375 mg cap capsule Take 1 Cap by mouth daily for 10 days. Qty: 10 Cap, Refills: 0  
  
nitrofurantoin, macrocrystal-monohydrate, (Macrobid) 100 mg capsule Take 1 Cap by mouth two (2) times a day. Qty: 9 Cap, Refills: 0 CONTINUE these medications which have NOT CHANGED Details  
umeclidinium-vilanteroL (Anoro Ellipta) 62.5-25 mcg/actuation inhaler INHALE 1 PUFF DAILY Qty: 1 Inhaler, Refills: 5  
  
albuterol (ProAir HFA) 90 mcg/actuation inhaler INHALE 2 PUFFS EVERY 4 HOURS AS NEEDED FOR WHEEZING Qty: 8.5 Inhaler, Refills: 4  
  
furosemide (LASIX) 20 mg tablet Take 1 Tab by mouth daily. Qty: 90 Tab, Refills: 1  
  
apixaban (ELIQUIS) 5 mg tablet Take 1 Tab by mouth two (2) times a day. Qty: 60 Tab, Refills: 3  
  
spironolactone (ALDACTONE) 25 mg tablet Take 1 Tab by mouth daily. Qty: 30 Tab, Refills: 3 predniSONE (DELTASONE) 10 mg tablet Take 30 mg by mouth daily (with breakfast). Qty: 90 Tab, Refills: 1  
  
white petrolatum (Protective Ointment) topical ointment Apply 1 Applicator to affected area two (2) times a week. Comments: apply thin layer to left buttock with dressing change and as needed for soilage  
  
insulin glargine (LANTUS) 100 unit/mL injection 10 Units by SubCUTAneous route daily. Qty: 1 Vial, Refills: 2  
  
pantoprazole (PROTONIX) 40 mg tablet Take 1 Tab by mouth Before breakfast and dinner. Qty: 60 Tab, Refills: 0  
  
therapeutic multivitamin (THERAGRAN) tablet Take 1 Tab by mouth daily. Qty: 30 Tab, Refills: 1  
  
magnesium oxide (MAG-OX) 400 mg tablet Take 1 Tab by mouth daily. Qty: 30 Tab, Refills: 1  
  
roflumilast (DALIRESP) 250 mcg tab Take 250 mcg by mouth daily. Qty: 30 Tab, Refills: 0  
  
tolterodine (DETROL) 1 mg tablet Take 1 Tab by mouth. traMADol (ULTRAM) 50 mg tablet Take 1 Tab by mouth.  
  
busPIRone (BUSPAR) 15 mg tablet Take 15 mg by mouth two (2) times a day. Indications: repeated episodes of anxiety Qty: 60 Tab, Refills: 2 OXYGEN-AIR DELIVERY SYSTEMS 3 L/min by Nasal route continuous. [employer] O2  
  
albuterol-ipratropium (DUO-NEB) 2.5 mg-0.5 mg/3 ml nebu 3 mL by Nebulization route every six (6) hours as needed for Wheezing. Qty: 30 Nebule, Refills: 1 cholecalciferol (VITAMIN D3) (1000 Units /25 mcg) tablet Take 1,000 Units by mouth daily. levothyroxine (SYNTHROID) 100 mcg tablet Take 100 mcg by mouth Daily (before breakfast). aspirin 81 mg chewable tablet Take 1 Tab by mouth daily (with breakfast). Indications: stroke prevention 
Qty: 30 Tab, Refills: 0  
  
ascorbic acid, vitamin C, (VITAMIN C) 250 mg tablet Take 1 Tab by mouth daily (with breakfast). Qty: 30 Tab, Refills: 0  Associated Diagnoses: Acute blood loss as cause of postoperative anemia  
  
calcium citrate 200 mg (950 mg) tablet Take 1 Tab by mouth two (2) times a day. Indications: post-menopausal osteoporosis prevention 
Qty: 60 Tab, Refills: 0 Associated Diagnoses: Status post laminectomy with spinal fusion  
  
ferrous sulfate 325 mg (65 mg iron) tablet Take 1 Tab by mouth daily (with breakfast). Qty: 30 Tab, Refills: 0 Associated Diagnoses: Acute blood loss as cause of postoperative anemia  
  
acetaminophen (TYLENOL) 325 mg tablet Take 2 Tabs by mouth every four (4) hours as needed for Pain. Indications: pain 
Qty: 60 Tab, Refills: 0 Associated Diagnoses: Status post laminectomy with spinal fusion  
  
brief disposable (ADULT) misc by Does Not Apply route. Qty: 1 Package, Refills: 0 Associated Diagnoses: Urge urinary incontinence  
  
polyethylene glycol (MIRALAX) 17 gram/dose powder Take 17 g by mouth daily. 1 tablespoon with 8 oz of water daily 
Qty: 289 g, Refills: 0 BD INSULIN SYRINGE ULTRA-FINE 1 mL 31 gauge x 5/16 syrg   
  
rosuvastatin (CRESTOR) 5 mg tablet TAKE ONE TABLET NIGHTLY Qty: 90 Tab, Refills: 3  
  
insulin lispro (HUMALOG) 100 unit/mL injection To be given 15 min before meals: < 150 - None, 151-200 - 2 u, 201-250 - 4 u, 251-300 - 6 u, 301-350 - 8 u, 351-400 - 10 u, >400 - 12 u Qty: 1 Vial, Refills: 0 Associated Diagnoses: Type 2 diabetes mellitus with stage 3 chronic kidney disease, with long-term current use of insulin (HCC)  
  
sertraline (ZOLOFT) 50 mg tablet Take 50 mg by mouth daily. Indications: Bipolar Depression  
  
amiodarone (CORDARONE) 200 mg tablet Take 1 Tab by mouth daily. Qty: 60 Tab, Refills: 3 STOP taking these medications  
  
 amLODIPine (NORVASC) 5 mg tablet Comments:  
Reason for Stopping: · It is important that you take the medication exactly as they are prescribed. · Keep your medication in the bottles provided by the pharmacist and keep a list of the medication names, dosages, and times to be taken in your wallet. · Do not take other medications without consulting your doctor. DIET:  Cardiac Diet and Diabetic Diet ACTIVITY: Activity as tolerated Home health care for Skilled care for COPD, DM, Hypertension and medication management 
 
·    PT/OT consult ADDITIONAL INFORMATION: If you experience any of the following symptoms but not limited to Fever, chills, nausea, vomiting, diarrhea, change in mentation, falling, bleeding, shortness of breath, chest pain, please call your primary care physician or return to the emergency room if you cannot get hold of your doctor:  
 
FOLLOW UP CARE: 
Dr. Conner Darling NP in 5-7 days. Please call and set up an appointment. Dr. Wilber Roca in 2 weeks Minutes spent on discharge: 40 minutes spent coordinating this discharge (review instructions/follow-up, prescriptions, preparing report for sign off) Jean Paul Bonilla MD 
5/19/2020 1:06 PM 
 
Disclaimer: Sections of this note are dictated using utilizing voice recognition software. Minor typographical errors may be present. If questions arise, please do not hesitate to contact me or call our department.

## 2020-05-19 NOTE — PROGRESS NOTES
Patient is not available to be assessed at this time. No family at bedside. [facility] Board Certified [address] [place] [address]  
[phone]

## 2020-05-19 NOTE — PROGRESS NOTES
5/19/2020 RE: Rain Wang Ernestine To Whom it May Concern:   
 
Yohan Mancia was admitted to hospital. COVID 19 was tested and its negative. Please feel free to contact my office if you have any questions or concerns. Thank you for your assistance in this matter. Sincerely, Perla Shepherd MD

## 2020-05-20 NOTE — ED TRIAGE NOTES
Back pain, chest pain, and shortness of breath. Patient is a heroin addict, requested methadone. Given narcan upon arrival and was more alert. Recent admission to inpatient unit last week. Has dressing and colostomy noted to abdomen. Abdomen is distended.

## 2020-05-20 NOTE — PROGRESS NOTES
Patient contacted regarding recent discharge and COVID-19 risk     Care Transition Nurse/ Ambulatory Care Manager contacted the patient by telephone to perform post discharge assessment. Verified name and  with patient as identifiers. Patient states that she is doing well. Pt. Reported that she has all her medications. Pt. Also reported that she has personal care aide. CDC guidelines,  and Red flags on when to go back to ED (Chest pain, difficulty breathing, shortness of breath, high fevers and/or worsening of symptoms) were reviewed and discuss with Pt. Pt. Verbalized and repeated back understanding    Patient has following risk factors of: COPD and diabetes. CTN/ACM reviewed discharge instructions, medical action plan and red flags related to discharge diagnosis. Reviewed and educated them on any new and changed medications related to discharge diagnosis. Advised obtaining a 90-day supply of all daily and as-needed medications. Education provided regarding infection prevention, and signs and symptoms of COVID-19 and when to seek medical attention with patient who verbalized understanding. Discussed exposure protocols and [facility] at higher risk for severe illness  and given an opportunity for questions and concerns. The patient agrees to contact the COVID-19 hotline [phone] or PCP office for questions related to their healthcare. CTN/ACM provided contact information for future reference. From CDC: Are you at higher risk for severe illness?  Wash your hands often.  Avoid close contact (6 feet, which is about two arm lengths) with people who are sick.  Put distance between yourself and other people if COVID-19 is spreading in your community.  Clean and disinfect frequently touched surfaces.  Avoid all cruise travel and non-essential air travel.    Call your healthcare professional if you have concerns about COVID-19 and your underlying condition or if you are sick. For more information on steps you can take to protect yourself, see CDC's How to Protect Yourself      Patient/family/caregiver given information for [facility] [facility] and agrees to enroll no      Plan for follow-up call in 7-14 days based on severity of symptoms and risk factors.

## 2020-05-20 NOTE — ED NOTES
After multiple attempts at a peripheral stick blood work was finally sent; however, patient became diaphoretic and began to complain of mid shoulder back pain on left side. Patient also became short of breath and began to moan and proclaim \"OH MY GOD OH MY GOD\". Physician made aware an ultrasound assisted line was needed. Patient also became hypertensive.

## 2020-05-21 PROBLEM — Z20.822 SUSPECTED COVID-19 VIRUS INFECTION: Status: ACTIVE | Noted: 2020-01-01

## 2020-05-21 NOTE — H&P
History & Physical 
 
Patient: Alek Harper MRN: [id number]  CSN: [id number] YOB: 1955  Age: 62 y.o. Sex: female DOA: 5/20/2020 HPI:  
 
Alek Harper is a 62 y.o. female with a past medical history of sarcoidosis on chronic steroids, COPD, chronic respiratory failure on 3 L nasal cannula baseline, continued tobacco abuse, pulmonary hypertension, paroxysmal atrial fibrillation on Eliquis, pulmonary hypertension, diabetes mellitus on insulin with neuropathy, chronic diastolic heart failure, GERD, gout, T6 compression fracture noted on 5/16/2020 CTA, hepatitis C status post treatment and a history of drug abuse. Patient was [facility] February 2020 [facility] for sigmoid perforation and septic shock, acute on chronic respiratory failure, acute diastolic congestive heart failure and an E. coli UTI. She had a laparotomy with colectomy and transverse colostomy. She was recently hospitalized for healthcare associated pneumonia with negative COVID-19 during that stay from 5/16 to 5/19/2020 at [facility]. On  5/20/2020 the patient developed back pain and chest pain with increased shortness of breath and lethargy. EMS was called and the patient was found to have a pulse ox of 85% on her baseline oxygen which improved 100% on a 15 L nonrebreather. She reported taking her normal dose of methadone that morning and presented to [facility] emergency room. In the ED her lethargy improved after Narcan. She became alert, but hypotensive with persistent hypoxia. Further history was obtained from the patient's daughter who lives with the patient and stated she has increased cough. Temperature 99, /120, heart rate 123, respiratory rate 24.   Patient demonstrated wheezing throughout all lung fields and bilateral lower extremity edema; showed abdominal distention and tenderness on exam. 
 Chest x-ray demonstrated diffuse interstitial opacities concerning for interstitial infiltrate or edema superimposed on chronic interstitial lung disease. WBC 17.5, hemoglobin 11.4, hematocrit 37.8, platelets 328; fibrinogen 323, sodium 142, potassium 3.0, chloride 101, CO2 39, blood glucose 1/2/1950, BUN 7, creatinine 0.86, magnesium 1.6, GFR is greater than 60, troponin was 0.03 troponin #2 was 1.69. UA remains pending. EKG was normal sinus rhythm with left atrial enlargement heart rate 96 no acute ischemic changes. CT of the abdomen pelvis had no acute findings of the abdomen or pelvis, developing subtle compression fractures versus degenerative Schmorl's nodes at L3 and L4, new groundglass lung process in the right middle lobe with features representative of COVID-19 pneumonia. Patient started to develop a prolonged QTC on a cardiac monitor emergency room and was given 2 g of magnesium. She received a fluid bolus of 30 cc/kg for hypertension and empiric antibiotics were initiated. Patient received 60 mg of p.o. prednisone; patient was placed on droplet plus precautions and admitted to [facility] stepdown unit. Past Medical History:  
Diagnosis Date  Abnormally low high density lipoprotein (HDL) cholesterol with hypertriglyceridemia Lipid profile (11/6/2016) showed , , HDL 38, LDL 37  Acute blood loss as cause of postoperative anemia 12/12/2019  Anxiety  Bipolar affective disorder ([address] 12/5/2012  Cellulitis of right forearm 05/04/2017  Chronic back pain  Chronic obstructive pulmonary disease (COPD) ([address] SOB, on [place] O2 Recent admission with psychosis  Chronic respiratory failure with hypoxia ([address] On home oxygen inhalation at 3 LPM via NC  
 Contusion of left elbow 5/4/2017  Depression  Diabetic neuropathy associated with type 2 diabetes mellitus ([address]  Diastolic dysfunction without heart failure Stable on diuretics  Falls frequently  Gastroesophageal reflux disease with hiatal hernia  Generalized osteoarthritis of multiple sites  Gout On Allopurinol  History of acute renal failure 2013  History of back injury   
 jumped out of second story window  History of fracture due to fall 2019  
 History of hepatitis C   
 treated  History of penicillin allergy  History of vitamin D deficiency 5/10/2017  Hypertensive heart disease without heart failure Better controlled  Hyperuricemia 2017  Hypothyroidism  Hypoxemia requiring supplemental oxygen 2014  Intravenous drug user 2017  Long term current use of aspirin  Memory difficulty  Menopause  Mixed connective tissue disease ([address] 5/10/2017  Obesity, Class I, BMI 30-34.9  Olecranon bursitis of right elbow 2017  Opioid dependence ([address] On Methadone  Recurrent genital herpes 2013  Right Achilles tendinitis 2017  Sarcoidosis  Sickle cell trait [facility]  Tobacco use disorder  Type 2 diabetes mellitus with diabetic neuropathy, with long-term current use of insulin ([facility] HbA1c (2019) = 7.1  Urge urinary incontinence 5/10/2017  Venous insufficiency  Wears glasses Past Surgical History:  
Procedure Laterality Date [address]  HX  SECTION    
 HX CYST REMOVAL Left  Left foot  HX OTHER SURGICAL Left 2017 S/P incision and drainage of the abscess of the left hand and the left foot (2017 - Dr. Maribeth Wasserman. Esperanza Aparicio)  HX THORACIC LAMINECTOMY  2019 S/P T10, T9, T8 laminectomy; T7, T8, T9, T10, T11, T12 posterior thoracic fusion; segmental spinal instrumentation; K2M Davis type, T7-T8, T11-T12 (2019 - Dr. Gil Moise)  HX TUBAL LIGATION Family History Problem Relation Age of Onset  Seizures Other  Diabetes Mother  Hypertension Mother  Heart Disease Mother  Cancer Mother  Diabetes Father  Stroke Father  Heart Disease Father  Headache Sister  Depression Neg Hx  Suicide Neg Hx  Psychotic Disorder Neg Hx  Substance Abuse Neg Hx  Dementia Neg Hx Social History Socioeconomic History  Marital status: SINGLE Spouse name: Not on file  Number of children: Not on file  Years of education: Not on file  Highest education level: Not on file Occupational History  Occupation: Not employed Employer: NOT EMPLOYED Tobacco Use  Smoking status: Current Every Day Smoker Packs/day: 1.00 Years: 30.00 Pack years: 30.00 Types: Cigarettes Start date: 12/2/1969  Smokeless tobacco: Never Used Substance and Sexual Activity  Alcohol use: No  
 Drug use: No  
  Types: Cocaine, Heroin Comment: +Cocaine Screen 2013 Social History Narrative Lives with 15year old son. Prior to Admission medications Medication Sig Start Date End Date Taking? Authorizing Provider  
amLODIPine (NORVASC) 5 mg tablet Take 5 mg by mouth daily. Provider, Historical  
oxybutynin (DITROPAN) 5 mg tablet Take 5 mg by mouth two (2) times a day. Provider, Historical  
methocarbamoL (ROBAXIN) 500 mg tablet Take 500 mg by mouth two (2) times a day. Provider, Historical  
guaiFENesin-dextromethorphan (ROBITUSSIN DM) 100-10 mg/5 mL syrup Take 10 mL by mouth every six (6) hours as needed for Cough or Congestion for up to 10 days. 5/19/20 5/29/20  Brenda Ortega MD  
lactobacillus sp. 50 billion cpu (BIO-K PLUS) 50 billion cell -375 mg cap capsule Take 1 Cap by mouth daily for 10 days. 5/19/20 5/29/20  Evelin Trevino MD  
nitrofurantoin, macrocrystal-monohydrate, (Macrobid) 100 mg capsule Take 1 Cap by mouth two (2) times a day.  5/19/20   Brenda Ortega MD  
doxycycline (VIBRAMYCIN) 100 mg capsule Take 1 Cap by mouth every twelve (12) hours for 5 days. 5/19/20 5/24/20  Sagar Freeman MD  
umeclidinium-vilanteroL (Anoro Ellipta) 62.5-25 mcg/actuation inhaler INHALE 1 PUFF DAILY 5/3/20   Ann Castanon MD  
albuterol (ProAir HFA) 90 mcg/actuation inhaler INHALE 2 PUFFS EVERY 4 HOURS AS NEEDED FOR WHEEZING 4/27/20   Ann Castanon MD  
furosemide (LASIX) 20 mg tablet Take 1 Tab by mouth daily. 4/22/20   Suma Parsons, NP  
apixaban (ELIQUIS) 5 mg tablet Take 1 Tab by mouth two (2) times a day. 4/21/20   Suma Parsons NP  
amiodarone (CORDARONE) 200 mg tablet Take 1 Tab by mouth daily. 4/8/20   Cuco Andres MD  
spironolactone (ALDACTONE) 25 mg tablet Take 1 Tab by mouth daily. 4/8/20   Cuco Andres MD  
predniSONE (DELTASONE) 10 mg tablet Take 30 mg by mouth daily (with breakfast). 4/3/20   Ann Castanon MD  
white petrolatum (Protective Ointment) topical ointment Apply 1 Applicator to affected area two (2) times a week. Provider, Historical  
insulin glargine (LANTUS) 100 unit/mL injection 10 Units by SubCUTAneous route daily. 3/22/20   Erin Bass MD  
pantoprazole (PROTONIX) 40 mg tablet Take 1 Tab by mouth Before breakfast and dinner. 3/22/20   Erin Bass MD  
therapeutic multivitamin [facility]) tablet Take 1 Tab by mouth daily. 3/22/20   Erin Bass MD  
magnesium oxide (MAG-OX) 400 mg tablet Take 1 Tab by mouth daily. 3/22/20   Erin Bass MD  
roflumilast (DALIRESP) 250 mcg tab Take 250 mcg by mouth daily. 2/28/20   Ann Castanon MD  
tolterodine (DETROL) 1 mg tablet Take 1 Tab by mouth. Provider, Historical  
traMADol (ULTRAM) 50 mg tablet Take 1 Tab by mouth. Provider, Historical  
busPIRone (BUSPAR) 15 mg tablet Take 15 mg by mouth two (2) times a day. Indications: repeated episodes of anxiety 2/13/20   Rodrigo Miller NP  
OXYGEN-AIR DELIVERY SYSTEMS 3 L/min by Nasal route continuous.  [employer] O2    Provider, Historical  
 albuterol-ipratropium (DUO-NEB) 2.5 mg-0.5 mg/3 ml nebu 3 mL by Nebulization route every six (6) hours as needed for Wheezing. 1/22/20   Parminder Cervantes MD  
cholecalciferol (VITAMIN D3) (1000 Units /25 mcg) tablet Take 1,000 Units by mouth daily. Provider, Historical  
levothyroxine (SYNTHROID) 100 mcg tablet Take 100 mcg by mouth Daily (before breakfast). Provider, Historical  
aspirin 81 mg chewable tablet Take 1 Tab by mouth daily (with breakfast). Indications: stroke prevention 1/13/20   Cherylene Falter, MD  
ascorbic acid, vitamin C, (VITAMIN C) 250 mg tablet Take 1 Tab by mouth daily (with breakfast). 1/14/20   Cherylene Falter, MD  
calcium citrate 200 mg (950 mg) tablet Take 1 Tab by mouth two (2) times a day. Indications: post-menopausal osteoporosis prevention 1/14/20   Cherylene Falter, MD  
ferrous sulfate 325 mg (65 mg iron) tablet Take 1 Tab by mouth daily (with breakfast). 1/14/20   Cherylene Falter, MD  
acetaminophen (TYLENOL) 325 mg tablet Take 2 Tabs by mouth every four (4) hours as needed for Pain. Indications: pain 1/13/20   Cherylene Falter, MD  
brief disposable (ADULT) misc by Does Not Apply route. 1/9/20   Cherylene Falter, MD  
polyethylene glycol [facility]) 17 gram/dose powder Take 17 g by mouth daily. 1 tablespoon with 8 oz of water daily 11/29/19   Arben April [place], [place]  
BD INSULIN SYRINGE ULTRA-FINE 1 mL 31 gauge x 5/16 syrg  11/5/19   Provider, Historical  
rosuvastatin (CRESTOR) 5 mg tablet TAKE ONE TABLET NIGHTLY Patient taking differently: Take 5 mg by mouth nightly. TAKE ONE TABLET NIGHTLY 2/21/19   Bereket Cartagena MD  
insulin lispro (HUMALOG) 100 unit/mL injection To be given 15 min before meals: < 150 - None, 151-200 - 2 u, 201-250 - 4 u, 251-300 - 6 u, 301-350 - 8 u, 351-400 - 10 u, >400 - 12 u Patient taking differently: by SubCUTAneous route three (3) times daily as needed for Other (subcutaneous three times daily as needed before meals per sliding scale. ). To be given 15 min before meals: < 150 - None, 151-200 - 2 u, 201-250 - 4 u, 251-300 - 6 u, 301-350 - 8 u, 351-400 - 10 u, >400 - 12 u 5/31/17   Heather Ayala MD  
sertraline (ZOLOFT) 50 mg tablet Take 100 mg by mouth daily. Indications: bipolar depression    Provider, Historical  
 
 
Allergies Allergen Reactions  Moxifloxacin Rash  Pcn [Penicillins] Swelling  Sulfa (Sulfonamide Antibiotics) Swelling Review of systems A 9 point review of systems was performed with pertinent positives as per the HPI Physical Exam:  
  
Visit Vitals /66 Pulse (!) 59 Temp 99 °F (37.2 °C) Resp 29 Wt 73.9 kg (163 lb) SpO2 100% BMI 29.81 kg/m² Physical Exam: 
GENERAL: fatigued, cooperative, mild distress HEENT head is atraumatic, speech is clear and goal-directed, patient is alert and oriented x3, conjunctiva clear, anicteric, pharynx clear, neck is supple, no JVD Chest coarse breath sounds with inspiratory wheezing upper lobes decreased bases heart rate 59 regular Abdomen midline abdominal dressing dry and intact, colostomy with no output at this time, bowel sounds positive. Purewick draining clear urine Extremities generalized severe muscle weakness, calves are soft and nontender no acute edema positive pulses Lab/Data Review: 
Labs: Results:  
   
Chemistry Recent Labs  
  05/21/20 
9742 05/20/20 1921 05/19/20 
3719 * 251* 311*  142 138  
K 3.3* 3.0* 3.4*  
 101 101 CO2 37* 39* 36* BUN 7 7 8 CREA 0.63 0.86 0.75 CA 7.6* 8.6 8.6 AGAP 0* 2* 1*  
BUCR 11* 8* 11* CBC w/Diff Recent Labs  
  05/20/20 1921 05/19/20 
7186 WBC 17.5* 9.9  
RBC 4.48 3.98* HGB 11.4* 10.0* HCT 37.8 33.1*  
 146 GRANS 67 85* LYMPH 27 8* EOS 0 0 Coagulation Recent Labs  
  05/21/20 
9412 PTP 15.8* INR 1.3* APTT 31.8 Iron/Ferritin No results for input(s): IRON in the last 72 hours. No lab exists for component: TIBCCALC BNP No results for input(s): BNPP in the last 72 hours. Cardiac Enzymes Recent Labs  
  05/20/20 
1921 CPK 68 CKND1 3.1 Liver Enzymes No results for input(s): TP, ALB, TBIL, AP, SGOT, GPT in the last 72 hours. No lab exists for component: DBIL Thyroid Studies Lab Results Component Value Date/Time TSH 0.53 05/17/2020 05:00 AM  
    
 
All Micro Results Procedure Component Value Units Date/Time CULTURE, BLOOD [[id number]] Collected:  05/20/20 2250 Order Status:  Completed Specimen:  Blood Updated:  05/21/20 3241 Special Requests: NO SPECIAL REQUESTS Culture result: NO GROWTH AFTER 6 HOURS     
 CULTURE, BLOOD [[id number]] Collected:  05/20/20 2305 Order Status:  Completed Specimen:  Blood Updated:  05/21/20 0774 Special Requests: NO SPECIAL REQUESTS Culture result: NO GROWTH AFTER 6 HOURS Imaging Reviewed: XR Results (most recent): 
Results from [facility] Encounter encounter on 05/20/20 XR CHEST PORT Narrative AP CHEST, PORTABLE INDICATION: Chest pain, shortness of breath, and/or arrhythmia COMPARISON: Prior chest x-rays, most recent 5/16/2020 FINDINGS:  EKG leads overlie the patient. Cardiac silhouette is stable. Persistent diffuse interstitial opacities . Increased hazy opacity at the lung bases. Lungs are hyperinflated. Possible 
small pleural effusions. No evidence for pneumothorax. No acute osseous 
abnormalities are identified. Impression Impression: 1. Diffuse interstitial opacities, concerning for interstitial infiltrate or 
edema superimposed on chronic interstitial lung disease. 2. Increased hazy opacities at the lung bases, possibly small effusions with 
overlying atelectasis. Superimposed infiltrate/edema not excluded. CT Results (most recent): 
Results from [facility] [place] Encounter encounter on 05/20/20 CT ABD PELV WO CONT Narrative CT ABDOMEN AND PELVIS WITHOUT ENHANCEMENT INDICATION: Acute tachycardia and hypertension. Abdominal distention and 
tenderness on exam. 
 
TECHNIQUE: Axial images obtained of the abdomen and pelvis without intravenous 
contrast. Coronal and sagittal reformatted images were obtained. All CT scans 
are performed using dose optimization techniques as appropriate to the performed 
exam including the following: Automated exposure control, adjustment of mA 
and/or kV according to patient size, and use of iterative reconstructive 
technique. COMPARISON: Ultrasound abdomen limited 5/18/2020, 5/16/2020 CTA chest 2/29/2020 CT chest, abdomen and pelvis. FINDINGS:  
Lung Base: Improved aeration of the left lung base atelectasis or consolidation, 
now mild. Stable mild peribronchial consolidation or atelectasis in the right 
lung base appearing stable to the prior but improved from 2/2020. New small 
sites of groundglass opacity in the right middle lobe on image 4-7, one of which 
is peripheral. Trace pleural fluid bilaterally. Liver: Unremarkable. Biliary: Unremarkable. Spleen: Medial splenic dome hypodensity measures 3.4 cm. Inferior splenic 
hypodensity measures 2.4 cm. No change. Pancreas: Unremarkable. Adrenal Glands: Unremarkable. Kidneys: Unremarkable. Bowel: Rectosigmoid junction surgical anastomosis. Colostomy. No evidence of 
obstruction. Mild to moderate right colonic stool. Normal appendix. Bladder/ Pelvic Organs: Unremarkable. Peritoneum/ Retroperitoneum: Unremarkable. Lymph Nodes: Unremarkable. Vessels: Unremarkable for age. Bones/Soft tissues: Scattered edema in the subcutaneous tissues . Thoracic 
fusion hardware. Chronic bilateral rib fracture deformities. Stable L2 mild 
compression fracture deformity. Minimal superior endplate fracture deformities 
or developing in the superior endplates of L3 and L4 towards the left, versus new degenerative Schmorl's nodes. 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 
heads. Impression IMPRESSION: 
 
1. New mild groundglass lung process in the right middle lobe. This has features 
that could be representative of COVID-19 pneumonia. The lung is not fully 
assessed on this abdomen study. Many other processes can have a similar 
appearance. 2. Other lung processes are improving over time. 3. No acute findings within the abdomen or pelvis. 4. Stable splenic lesions. 5. Developing subtle compression fractures versus degenerative Schmorl's nodes 
at L3 and L4. Assessment:  
Principal Problem: HCAP (healthcare-associated pneumonia) (5/16/2020) Active Problems: 
  Chronic obstructive pulmonary disease (COPD) ([facility]) () Overview: SOB, on [place] O2 Recent admission with psychosis Type 2 diabetes mellitus with diabetic neuropathy, with long-term current use of insulin ([facility]) () Overview: HbA1c (12/11/2019) = 7.1 Recurrent genital herpes (5/31/2013) Sarcoidosis () Gastroesophageal reflux disease with hiatal hernia () Compression fracture of body of thoracic vertebra ([facility]) (12/11/2019) PAF (paroxysmal atrial fibrillation) ([address] (4/8/2020) Overview: Noted in hospital.  She was started on amiodarone and Eliquis continue  
    current treatment stable Pulmonary hypertension, moderate to severe ([address] (5/16/2020) Suspected COVID-19 virus infection (5/21/2020) Colostomy care [facility]) (5/23/2020) Acute on chronic diastolic (congestive) heart failure ([address] (5/21/2020) Elevated troponin (5/21/2020) Chronic prednisone use at home Hypokalemia Prolonged QT interval in emergency room Chronic methadone use Narcosis reversed with Narcan in the emergency room Plan:  
Admit to COVID pending stepdown unit Continue heparin infusion Continue empiric antibiotics Continue prednisone make the dose 30 mg daily PPI 
 Continue basal insulin and POC glucose with coverage Hold methadone for now due to prolonged QT in the emergency room Emergent COVID panel was sent from the ED Daily inflammatory markers Full code Iggy Duarte,  
5/21/2020, 8:16 AM

## 2020-05-21 NOTE — CONSULTS
[facility] - Consult Note Date of  Admission: 5/20/2020  6:32 PM 
  
Primary Care Physician:  Olivier Elizabeth NP Plan: 1. NSTEMI- with positive troponin now down trending- patient is chest pain free. Continue heparin drip, asa, statin. Monitor for ischemia symptoms. Follow up echo to reassess lvf, PAP. Cardiac w/u when COV-19 r/o and stable 2. Suspected COV-19 3. Possible pneumonia- managed by medical team  
4. Paroxymal Atrial flutter/atrial fibrillation  with RVR- maintaining NSR- continue  amiodarone po. Patient was on eliquis at home 5. Acute on Chronic Congestive heart failure- decompensated. NT pro BNP >4.000 no significant peripheral  Edema. Added gently diuresis lasix 20 mg bid. Per parameters 6. Uncontrolled Hypertension- on admission. She was initial placed on Cardene drip.  now with low Normal BP-hold BP medications will monitor. 7. Recent Acute Sigmoid Colon Perforation-S/p colectomy and transverse colostomy on 3/1/20  
8. Hx sarcoidosis- on chronic prednisone  
9. Pulmonary hypertension with PAP 57 mmHg  on echo on 2/20 
10. Hx of COPD, on chronic 3L NC at home. 11. Hx of Cocaine and Heroin Abuse - on methadone. Patient with acute on chronic diastolic CHF-- continue diuresis. Also has elevated cardiac enzyme-- heparin for now. Will consider cardiac w/u after COVID test result. 02/29/20 ECHO ADULT FOLLOW-UP OR LIMITED 03/17/2020 3/17/2020 Narrative · Normal cavity size and systolic function (ejection fraction normal). Estimated left ventricular ejection fraction is 55 - 60%. Visually  
measured ejection fraction. No regional wall motion abnormality noted. · Mild tricuspid valve regurgitation is present. · Moderate pulmonary hypertension. Pulmonary arterial systolic pressure is 57 mmHg. · Right Ventricle: Normal cavity size and global systolic function. Normal  
wall motion.  
   
  Signed by: Leyla Alvarado MD  
 
 
  
  
 
 XR Results (most recent): 
Results from [facility] [place] Encounter encounter on 05/20/20 XR CHEST PORT Narrative AP CHEST, PORTABLE INDICATION: Chest pain, shortness of breath, and/or arrhythmia COMPARISON: Prior chest x-rays, most recent 5/16/2020 FINDINGS:  EKG leads overlie the patient. Cardiac silhouette is stable. Persistent diffuse interstitial opacities . Increased hazy opacity at the lung bases. Lungs are hyperinflated. Possible 
small pleural effusions. No evidence for pneumothorax. No acute osseous 
abnormalities are identified. Impression Impression: 1. Diffuse interstitial opacities, concerning for interstitial infiltrate or 
edema superimposed on chronic interstitial lung disease. 2. Increased hazy opacities at the lung bases, possibly small effusions with 
overlying atelectasis. Superimposed infiltrate/edema not excluded. Assessment:  
 
Hospital Problems  Date Reviewed: 2/28/2020 Codes Class Noted POA Suspected COVID-19 virus infection ICD-10-CM: Z20.828 ICD-9-CM: V01.79  5/21/2020 Yes * (Principal) HCAP (healthcare-associated pneumonia) ICD-10-CM: J18.9 ICD-9-CM: 215  5/16/2020 Yes PAF (paroxysmal atrial fibrillation) (HCC) ICD-10-CM: I48.0 ICD-9-CM: 427.31  4/8/2020 Yes Overview Signed 4/8/2020  9:53 AM by Yoli Astudillo MD  
  Noted in hospital.  She was started on amiodarone and Eliquis continue current treatment stable Sarcoidosis (Chronic) ICD-10-CM: E84.3 ICD-9-CM: 135  Unknown Yes Chronic obstructive pulmonary disease (COPD) (HCC) (Chronic) ICD-10-CM: J44.9 ICD-9-CM: 582  Unknown Yes Overview Signed 4/23/2013 10:01 AM by Isak DE LA CRUZ, on [place] O2 Recent admission with psychosis Type 2 diabetes mellitus with diabetic neuropathy, with long-term current use of insulin (HCC) (Chronic) ICD-10-CM: E11.40, Z79.4 ICD-9-CM: 250.60, 357.2, V58.67  Unknown Yes Overview Signed 12/18/2019  2:13 PM by Merritt Elizabeth MD  
  HbA1c (12/11/2019) = 7.1 History of Present Illness: This is a 62 y.o. female admitted for HCAP (healthcare-associated pneumonia) [place]. 9]Suspected COVID-19 virus infection [Z20.828]. Patient with PMHx of  CHF,  hypertension, COPD and Polysubstance abuse on methadone who presents via EMS for shortness of breath. Per EMS patient was satting 85% which improved to 100% on 15 L nonrebreather. Patient was recently discharged from the hospital yesterday for community-acquired pneumonia. Patient became acutely short of breath while getting blood drawn at methadone clinic today. Patient reports taking her normal dose of methadone this morning. Spoke with patient's daughter who states that she did not see patient take any more methadone or any other drugs today and she was around her all day. Patient is resting in bed, no distress noted she is on Chronic O2 at home, c/o SOB  and cough, no fevers no chest pain no chest pressure. No dizziness no palpitations. she is non ambulatory. Patient is chair bound.  
  
 
 Past Medical History:  
 
Past Medical History:  
Diagnosis Date  Abnormally low high density lipoprotein (HDL) cholesterol with hypertriglyceridemia Lipid profile (11/6/2016) showed , , HDL 38, LDL 37  Acute blood loss as cause of postoperative anemia 12/12/2019  Anxiety  Bipolar affective disorder ([address] 12/5/2012  Cellulitis of right forearm 05/04/2017  Chronic back pain  Chronic obstructive pulmonary disease (COPD) ([address] SOB, on paula O2 Recent admission with psychosis  Chronic respiratory failure with hypoxia ([address] On home oxygen inhalation at 3 LPM via NC  
 Contusion of left elbow 5/4/2017  Depression  Diabetic neuropathy associated with type 2 diabetes mellitus ([address]  Diastolic dysfunction without heart failure Stable on diuretics  Falls frequently  Gastroesophageal reflux disease with hiatal hernia  Generalized osteoarthritis of multiple sites  Gout On Allopurinol  History of acute renal failure 5/31/2013  History of back injury   
 jumped out of second story window  History of fracture due to fall 12/11/2019  
 History of hepatitis C   
 treated  History of penicillin allergy  History of vitamin D deficiency 5/10/2017  Hypertensive heart disease without heart failure Better controlled  Hyperuricemia 5/26/2017  Hypothyroidism  Hypoxemia requiring supplemental oxygen 12/29/2014  Intravenous drug user 5/2/2017  Long term current use of aspirin  Memory difficulty  Menopause  Mixed connective tissue disease ([facility] 5/10/2017  Obesity, Class I, BMI 30-34.9  Olecranon bursitis of right elbow 5/4/2017  Opioid dependence ([facility] [facility] On Methadone  Recurrent genital herpes 5/31/2013  Right Achilles tendinitis 5/2/2017  Sarcoidosis  Sickle cell trait ([facility] [facility]  Tobacco use disorder  Type 2 diabetes mellitus with diabetic neuropathy, with long-term current use of insulin ([facility] [facility] HbA1c (12/11/2019) = 7.1  Urge urinary incontinence 5/10/2017  Venous insufficiency  Wears glasses Social History:  
 
Social History Socioeconomic History  Marital status: SINGLE Spouse name: Not on file  Number of children: Not on file  Years of education: Not on file  Highest education level: Not on file Occupational History  Occupation: Not employed Employer: NOT EMPLOYED Tobacco Use  Smoking status: Current Every Day Smoker Packs/day: 1.00 Years: 30.00 Pack years: 30.00 Types: Cigarettes Start date: 12/2/1969  Smokeless tobacco: Never Used Substance and Sexual Activity  Alcohol use: No  
 Drug use: No  
  Types: Cocaine, Heroin Comment: +Cocaine Screen 2013 Social History Narrative Lives with 15year old son. Family History:  
 
Family History Problem Relation Age of Onset  Seizures Other  Diabetes Mother  Hypertension Mother  Heart Disease Mother  Cancer Mother  Diabetes Father  Stroke Father  Heart Disease Father  Headache Sister  Depression Neg Hx  Suicide Neg Hx  Psychotic Disorder Neg Hx  Substance Abuse Neg Hx  Dementia Neg Hx Medications: Allergies Allergen Reactions  Moxifloxacin Rash  Pcn [Penicillins] Swelling  Sulfa (Sulfonamide Antibiotics) Swelling Current Facility-Administered Medications Medication Dose Route Frequency  heparin 25,000 units in D5W 250 ml infusion  12-25 Units/kg/hr IntraVENous TITRATE  aztreonam (AZACTAM) 1 g in 0.9% sodium chloride (MBP/ADV) 100 mL MBP  1 g IntraVENous Q6H  
 sodium chloride (NS) flush 5-10 mL  5-10 mL IntraVENous PRN  
 vancomycin (VANCOCIN) 750 mg in  mL infusion  750 mg IntraVENous Q12H Current Outpatient Medications Medication Sig  
 amLODIPine (NORVASC) 5 mg tablet Take 5 mg by mouth daily.  oxybutynin (DITROPAN) 5 mg tablet Take 5 mg by mouth two (2) times a day.  methocarbamoL (ROBAXIN) 500 mg tablet Take 500 mg by mouth two (2) times a day.  guaiFENesin-dextromethorphan (ROBITUSSIN DM) 100-10 mg/5 mL syrup Take 10 mL by mouth every six (6) hours as needed for Cough or Congestion for up to 10 days.  lactobacillus sp. 50 billion cpu (BIO-K PLUS) 50 billion cell -375 mg cap capsule Take 1 Cap by mouth daily for 10 days.  nitrofurantoin, macrocrystal-monohydrate, (Macrobid) 100 mg capsule Take 1 Cap by mouth two (2) times a day.  doxycycline (VIBRAMYCIN) 100 mg capsule Take 1 Cap by mouth every twelve (12) hours for 5 days.  umeclidinium-vilanteroL (Anoro Ellipta) 62.5-25 mcg/actuation inhaler INHALE 1 PUFF DAILY  albuterol (ProAir HFA) 90 mcg/actuation inhaler INHALE 2 PUFFS EVERY 4 HOURS AS NEEDED FOR WHEEZING  furosemide (LASIX) 20 mg tablet Take 1 Tab by mouth daily.  apixaban (ELIQUIS) 5 mg tablet Take 1 Tab by mouth two (2) times a day.  amiodarone (CORDARONE) 200 mg tablet Take 1 Tab by mouth daily.  spironolactone (ALDACTONE) 25 mg tablet Take 1 Tab by mouth daily.  predniSONE (DELTASONE) 10 mg tablet Take 30 mg by mouth daily (with breakfast).  white petrolatum (Protective Ointment) topical ointment Apply 1 Applicator to affected area two (2) times a week.  insulin glargine (LANTUS) 100 unit/mL injection 10 Units by SubCUTAneous route daily.  pantoprazole (PROTONIX) 40 mg tablet Take 1 Tab by mouth Before breakfast and dinner.  therapeutic multivitamin (THERAGRAN) tablet Take 1 Tab by mouth daily.  magnesium oxide (MAG-OX) 400 mg tablet Take 1 Tab by mouth daily.  roflumilast (DALIRESP) 250 mcg tab Take 250 mcg by mouth daily.  tolterodine (DETROL) 1 mg tablet Take 1 Tab by mouth.  traMADol (ULTRAM) 50 mg tablet Take 1 Tab by mouth.  busPIRone (BUSPAR) 15 mg tablet Take 15 mg by mouth two (2) times a day. Indications: repeated episodes of anxiety  OXYGEN-AIR DELIVERY SYSTEMS 3 L/min by Nasal route continuous. [facility] [employer] O2  
 albuterol-ipratropium (DUO-NEB) 2.5 mg-0.5 mg/3 ml nebu 3 mL by Nebulization route every six (6) hours as needed for Wheezing.  cholecalciferol (VITAMIN D3) (1000 Units /25 mcg) tablet Take 1,000 Units by mouth daily.  levothyroxine (SYNTHROID) 100 mcg tablet Take 100 mcg by mouth Daily (before breakfast).  aspirin 81 mg chewable tablet Take 1 Tab by mouth daily (with breakfast). Indications: stroke prevention  ascorbic acid, vitamin C, (VITAMIN C) 250 mg tablet Take 1 Tab by mouth daily (with breakfast).  calcium citrate 200 mg (950 mg) tablet Take 1 Tab by mouth two (2) times a day. Indications: post-menopausal osteoporosis prevention  ferrous sulfate 325 mg (65 mg iron) tablet Take 1 Tab by mouth daily (with breakfast).  acetaminophen (TYLENOL) 325 mg tablet Take 2 Tabs by mouth every four (4) hours as needed for Pain. Indications: pain  brief disposable (ADULT) misc by Does Not Apply route.  polyethylene glycol (MIRALAX) 17 gram/dose powder Take 17 g by mouth daily. 1 tablespoon with 8 oz of water daily  BD INSULIN SYRINGE ULTRA-FINE 1 mL 31 gauge x 5/16 syrg  rosuvastatin (CRESTOR) 5 mg tablet TAKE ONE TABLET NIGHTLY (Patient taking differently: Take 5 mg by mouth nightly. TAKE ONE TABLET NIGHTLY)  insulin lispro (HUMALOG) 100 unit/mL injection To be given 15 min before meals: < 150 - None, 151-200 - 2 u, 201-250 - 4 u, 251-300 - 6 u, 301-350 - 8 u, 351-400 - 10 u, >400 - 12 u (Patient taking differently: by SubCUTAneous route three (3) times daily as needed for Other (subcutaneous three times daily as needed before meals per sliding scale. ). To be given 15 min before meals: < 150 - None, 151-200 - 2 u, 201-250 - 4 u, 251-300 - 6 u, 301-350 - 8 u, 351-400 - 10 u, >400 - 12 u)  sertraline (ZOLOFT) 50 mg tablet Take 100 mg by mouth daily. Indications: bipolar depression Review Of Systems:  
 
 
 
 
Constitutional: No fever, no chills, no weight loss, no night sweats HEENT: No epistaxis, no nasal drainage, no difficulty in swallowing, no redness in eyes Respiratory: Respiratory: cough, sputum,  dyspnea on exertion Cardiovascular:dyspnea, chronic leg edema Gastrointestinal: no abd pain, no vomiting, no diarrhea, no bleeding symptoms Genitourinary: No urinary symptoms or hematuria Integument/breast: No ulcers or rashes Musculoskeletal: BLE weakness Neurological: No focal weakness, no seizures, no headaches Behvioral/Psych: No anxiety, no depression Physical Exam:  
 
Visit Vitals /67 Pulse 60 Temp 99 °F (37.2 °C) Resp 21 Wt 73.9 kg (163 lb) SpO2 100% BMI 29.81 kg/m² BP Readings from Last 3 Encounters:  
05/21/20 112/67  
05/20/20 120/82  
05/19/20 133/75 Pulse Readings from Last 3 Encounters:  
05/21/20 60  
05/20/20 87  
05/19/20 76 Wt Readings from Last 3 Encounters:  
05/20/20 73.9 kg (163 lb) 05/18/20 74.2 kg (163 lb 9.6 oz) 05/07/20 74.8 kg (165 lb) General:  alert, moderately obese, dyspneic on O2 by NC Skin: Warm and dry, acyanotic, normal color. Head: Normocephalic, atraumatic. Eyes: Sclerae anicteric, conjunctivae without injection. Neck:  nontender, no nuchal rigidity, no masses, no stridor, no carotid bruit, unable to assess  JVD due to obesity Lungs: Lewis crackles at the bases of  both the left and right lungs Heart:  regular rate and rhythm, S1, S2 normal, no S3 or S4, no click Abdomen:  abdomen is soft without significant tenderness, masses, organomegaly or guarding Extremities:  extremities normal, atraumatic, no cyanosis. Trace edema BLE. Peripheral pulses Neurological: BLE weakness Psychiatric Affect: The patient is awake, alert and oriented x3. Kashtodd Mcclure is interactive and appropriate. Data Review:  
 
Recent Results (from the past 48 hour(s)) GLUCOSE, POC Collection Time: 05/19/20 12:35 PM  
Result Value Ref Range Glucose (POC) 352 (H) 70 - 110 mg/dL EKG, 12 LEAD, INITIAL Collection Time: 05/20/20  6:42 PM  
Result Value Ref Range Ventricular Rate 96 BPM  
 Atrial Rate 96 BPM  
 P-R Interval 118 ms QRS Duration 80 ms  
 Q-T Interval 376 ms QTC Calculation (Bezet) 475 ms Calculated P Axis 55 degrees Calculated R Axis 36 degrees Calculated T Axis 59 degrees Diagnosis Normal sinus rhythm Left atrial enlargement Septal infarct , age undetermined Abnormal ECG When compared with ECG of 16-MAY-2020 11:38, 
Previous ECG has undetermined rhythm, needs review ST less depressed in Anterior leads CBC WITH AUTOMATED DIFF Collection Time: 05/20/20  7:21 PM  
Result Value Ref Range WBC 17.5 (H) 4.6 - 13.2 K/uL  
 RBC 4.48 4.20 - 5.30 M/uL  
 HGB 11.4 (L) 12.0 - 16.0 g/dL HCT 37.8 35.0 - 45.0 % MCV 84.4 74.0 - 97.0 FL  
 MCH 25.4 24.0 - 34.0 PG  
 MCHC 30.2 (L) 31.0 - 37.0 g/dL  
 RDW 17.1 (H) 11.6 - 14.5 % PLATELET 908 579 - 176 K/uL MPV 9.1 (L) 9.2 - 11.8 FL  
 NEUTROPHILS 67 40 - 73 % LYMPHOCYTES 27 21 - 52 % MONOCYTES 6 3 - 10 % EOSINOPHILS 0 0 - 5 % BASOPHILS 0 0 - 2 %  
 ABS. NEUTROPHILS 11.8 (H) 1.8 - 8.0 K/UL  
 ABS. LYMPHOCYTES 4.7 (H) 0.9 - 3.6 K/UL  
 ABS. MONOCYTES 1.0 0.05 - 1.2 K/UL  
 ABS. EOSINOPHILS 0.1 0.0 - 0.4 K/UL  
 ABS. BASOPHILS 0.0 0.0 - 0.1 K/UL  
 DF AUTOMATED METABOLIC PANEL, BASIC Collection Time: 05/20/20  7:21 PM  
Result Value Ref Range Sodium 142 136 - 145 mmol/L Potassium 3.0 (L) 3.5 - 5.5 mmol/L Chloride 101 100 - 111 mmol/L  
 CO2 39 (H) 21 - 32 mmol/L Anion gap 2 (L) 3.0 - 18 mmol/L Glucose 251 (H) 74 - 99 mg/dL BUN 7 7.0 - 18 MG/DL Creatinine 0.86 0.6 - 1.3 MG/DL  
 BUN/Creatinine ratio 8 (L) 12 - 20 GFR est AA >60 >60 ml/min/1.73m2 GFR est non-AA >60 >60 ml/min/1.73m2 Calcium 8.6 8.5 - 10.1 MG/DL MAGNESIUM Collection Time: 05/20/20  7:21 PM  
Result Value Ref Range Magnesium 1.6 1.6 - 2.6 mg/dL CARDIAC PANEL,(CK, CKMB & TROPONIN) Collection Time: 05/20/20  7:21 PM  
Result Value Ref Range CK - MB 2.1 <3.6 ng/ml CK-MB Index 3.1 0.0 - 4.0 % CK 68 26 - 192 U/L Troponin-I, QT 0.03 0.0 - 0.045 NG/ML  
GLUCOSE, POC Collection Time: 05/20/20  7:23 PM  
Result Value Ref Range Glucose (POC) 273 (H) 70 - 110 mg/dL POC LACTIC ACID Collection Time: 05/20/20  7:27 PM  
Result Value Ref Range Lactic Acid (POC) 1.89 0.40 - 2.00 mmol/L  
CULTURE, BLOOD Collection Time: 05/20/20 10:50 PM  
Result Value Ref Range Special Requests: NO SPECIAL REQUESTS Culture result: NO GROWTH AFTER 9 HOURS    
CULTURE, BLOOD  Collection Time: 05/20/20 11:05 PM  
 Result Value Ref Range Special Requests: NO SPECIAL REQUESTS Culture result: NO GROWTH AFTER 9 HOURS    
TROPONIN I Collection Time: 05/21/20  2:20 AM  
Result Value Ref Range Troponin-I, QT 1.69 (HH) 0.0 - 0.045 NG/ML  
PROCALCITONIN Collection Time: 05/21/20  3:33 AM  
Result Value Ref Range Procalcitonin 0.19 ng/mL C REACTIVE PROTEIN, QT Collection Time: 05/21/20  3:33 AM  
Result Value Ref Range C-Reactive protein 2.3 (H) 0 - 0.3 mg/dL LD Collection Time: 05/21/20  3:33 AM  
Result Value Ref Range  (H) 81 - 234 U/L FERRITIN Collection Time: 05/21/20  3:33 AM  
Result Value Ref Range Ferritin 65 8 - 388 NG/ML  
TROPONIN I Collection Time: 05/21/20  3:33 AM  
Result Value Ref Range Troponin-I, QT 1.60 (HH) 0.0 - 0.045 NG/ML  
FIBRINOGEN Collection Time: 05/21/20  3:33 AM  
Result Value Ref Range Fibrinogen 323 210 - 451 mg/dL PROTHROMBIN TIME + INR Collection Time: 05/21/20  3:33 AM  
Result Value Ref Range Prothrombin time 15.8 (H) 11.5 - 15.2 sec INR 1.3 (H) 0.8 - 1.2 PTT Collection Time: 05/21/20  3:33 AM  
Result Value Ref Range aPTT 31.8 23.0 - 36.4 SEC METABOLIC PANEL, BASIC Collection Time: 05/21/20  3:33 AM  
Result Value Ref Range Sodium 143 136 - 145 mmol/L Potassium 3.3 (L) 3.5 - 5.5 mmol/L Chloride 106 100 - 111 mmol/L  
 CO2 37 (H) 21 - 32 mmol/L Anion gap 0 (L) 3.0 - 18 mmol/L Glucose 165 (H) 74 - 99 mg/dL BUN 7 7.0 - 18 MG/DL Creatinine 0.63 0.6 - 1.3 MG/DL  
 BUN/Creatinine ratio 11 (L) 12 - 20 GFR est AA >60 >60 ml/min/1.73m2 GFR est non-AA >60 >60 ml/min/1.73m2 Calcium 7.6 (L) 8.5 - 10.1 MG/DL  
EKG, 12 LEAD, SUBSEQUENT Collection Time: 05/21/20  4:15 AM  
Result Value Ref Range Ventricular Rate 66 BPM  
 Atrial Rate 66 BPM  
 P-R Interval 128 ms QRS Duration 80 ms  
 Q-T Interval 482 ms QTC Calculation (Bezet) 505 ms Calculated P Axis 62 degrees Calculated R Axis 15 degrees Calculated T Axis 43 degrees Diagnosis Normal sinus rhythm Left atrial enlargement Septal infarct (cited on or before 20-MAY-2020) Abnormal ECG When compared with ECG of 20-MAY-2020 18:42, 
Questionable change in initial forces of Septal leads Nonspecific T wave abnormality now evident in Anterior leads Intake/Output Summary (Last 24 hours) at 5/21/2020 0945 Last data filed at 5/21/2020 1123 Gross per 24 hour Intake 100 ml Output  Net 100 ml Cardiographics:  
 
 
EKG Results Procedure 720 Value Units Date/Time EKG, 12 LEAD, REPEAT [[id number]] Collected:  05/21/20 0415 Order Status:  Completed Updated:  05/21/20 6782 Ventricular Rate 66 BPM   
  Atrial Rate 66 BPM   
  P-R Interval 128 ms QRS Duration 80 ms   
  Q-T Interval 482 ms QTC Calculation (Bezet) 505 ms Calculated P Axis 62 degrees Calculated R Axis 15 degrees Calculated T Axis 43 degrees Diagnosis --  
  Normal sinus rhythm Left atrial enlargement Septal infarct (cited on or before 20-MAY-2020) Abnormal ECG When compared with ECG of 20-MAY-2020 18:42, 
Questionable change in initial forces of Septal leads Nonspecific T wave abnormality now evident in Anterior leads EKG, 12 LEAD, INITIAL [[id number]] Order Status:  Sent EKG, 12 LEAD, INITIAL [[id number]] Collected:  05/20/20 1842 Order Status:  Completed Updated:  05/20/20 9905 Ventricular Rate 96 BPM   
  Atrial Rate 96 BPM   
  P-R Interval 118 ms QRS Duration 80 ms   
  Q-T Interval 376 ms QTC Calculation (Bezet) 475 ms Calculated P Axis 55 degrees Calculated R Axis 36 degrees Calculated T Axis 59 degrees Diagnosis --  
  Normal sinus rhythm Left atrial enlargement Septal infarct , age undetermined Abnormal ECG When compared with ECG of 16-MAY-2020 11:38, 
Previous ECG has undetermined rhythm, needs review ST less depressed in Anterior leads 02/29/20 ECHO ADULT FOLLOW-UP OR LIMITED 03/17/2020 3/17/2020 Narrative · Normal cavity size and systolic function (ejection fraction normal). Estimated left ventricular ejection fraction is 55 - 60%. Visually  
measured ejection fraction. No regional wall motion abnormality noted. · Mild tricuspid valve regurgitation is present. · Moderate pulmonary hypertension. Pulmonary arterial systolic pressure is 57 mmHg. · Right Ventricle: Normal cavity size and global systolic function. Normal  
wall motion. Signed by: Leyla Alvarado MD  
 
 
 
Signed By: Niru Blanco NP-C Phone [phone] supervised May 21, 2020 I have independently evaluated the patient. All relevant labs and testing data's are reviewed. Care plan discussed and updated after review.  
 
Silver Spears MD

## 2020-05-21 NOTE — ROUTINE PROCESS
Received patient in bed, awake, alert and oriented. heparin infusing. Oxygen on at 3 lpm via nasal cannula. Bruises noted. head with dressingNo complaints made, will continue to monitor. Report  Given by Asif Ash. 12:01 AM 
Due medications given, well tolerated. Patient call frequently if not assisted immediately, calls the family and family call the station. Colostomy care done. Incontinence care done. Verbal shift change report given to Cori Cotter RN (oncoming nurse) by Janet Lindsey (offgoing nurse). Report included the following information Kardex, Intake/Output, MAR and Cardiac Rhythm nsr.

## 2020-05-21 NOTE — ED PROVIDER NOTES
EMERGENCY DEPARTMENT HISTORY AND PHYSICAL EXAM 
 
 
Date: 5/20/2020 Patient Name: Dewayne House History of Presenting Illness Chief Complaint Patient presents with  Shortness of Breath History Provided By: Patient, Patient's Daughter and EMS History: Dewayne House is a 62 y.o. female with diabetes, hypertension, COPD and Polysubstance abuse who presents via EMS for shortness of breath. Per EMS patient was satting 85% which improved to 100% on 15 L nonrebreather. Patient was recently discharged from the hospital yesterday for community-acquired pneumonia. Patient became acutely short of breath while getting blood drawn at methadone clinic today. Patient reports taking her normal dose of methadone this morning. Spoke with patient's daughter who states that she did not see patient take any more methadone or any other drugs today and she was around her all day. Patient denies chest pain, fever, chills, weakness, lightheadedness, or myalgias. She states that she has been coughing. PCP: Aniket Silveira NP Current Facility-Administered Medications Medication Dose Route Frequency Provider Last Rate Last Dose  naloxone (NARCAN) injection 1 mg  1 mg IntraVENous NOW Toribio Marmolejo MD   Stopped at 05/20/20 9500  [START ON 5/21/2020] aztreonam (AZACTAM) 1 g in 0.9% sodium chloride (MBP/ADV) 100 mL MBP  1 g IntraVENous Q6H Mely Walden MD      
 potassium chloride 10 mEq in 100 ml IVPB  10 mEq IntraVENous Q1H Mely Walden MD      
 sodium chloride (NS) flush 5-10 mL  5-10 mL IntraVENous PRN Mely Walden MD      
 lactated ringers bolus infusion 1,000 mL  1,000 mL IntraVENous ONCE Mely Walden MD      
 Followed by  
Creston Sicard lactated ringers bolus infusion 217 mL  217 mL IntraVENous ONCE Mely Walden MD      
 vancomycin (VANCOCIN) 1500 mg in  ml infusion  1,500 mg IntraVENous ONCE Mely Walden MD      
 Followed by  
  [START ON 5/21/2020] vancomycin (VANCOCIN) 750 mg in  mL infusion  750 mg IntraVENous Q12H Mely Walden MD      
 magnesium sulfate 2 g/50 ml IVPB (premix or compounded)  2 g IntraVENous NOW Alexis Colunga MD      
 
Current Outpatient Medications Medication Sig Dispense Refill  amLODIPine (NORVASC) 5 mg tablet Take 5 mg by mouth daily.  oxybutynin (DITROPAN) 5 mg tablet Take 5 mg by mouth two (2) times a day.  methocarbamoL (ROBAXIN) 500 mg tablet Take 500 mg by mouth two (2) times a day.  guaiFENesin-dextromethorphan (ROBITUSSIN DM) 100-10 mg/5 mL syrup Take 10 mL by mouth every six (6) hours as needed for Cough or Congestion for up to 10 days. 100 mL 0  
 lactobacillus sp. 50 billion cpu (BIO-K PLUS) 50 billion cell -375 mg cap capsule Take 1 Cap by mouth daily for 10 days. 10 Cap 0  
 nitrofurantoin, macrocrystal-monohydrate, (Macrobid) 100 mg capsule Take 1 Cap by mouth two (2) times a day. 9 Cap 0  
 doxycycline (VIBRAMYCIN) 100 mg capsule Take 1 Cap by mouth every twelve (12) hours for 5 days. 10 Cap 0  
 umeclidinium-vilanteroL (Anoro Ellipta) 62.5-25 mcg/actuation inhaler INHALE 1 PUFF DAILY 1 Inhaler 5  
 albuterol (ProAir HFA) 90 mcg/actuation inhaler INHALE 2 PUFFS EVERY 4 HOURS AS NEEDED FOR WHEEZING 8.5 Inhaler 4  furosemide (LASIX) 20 mg tablet Take 1 Tab by mouth daily. 90 Tab 1  
 apixaban (ELIQUIS) 5 mg tablet Take 1 Tab by mouth two (2) times a day. 60 Tab 3  
 amiodarone (CORDARONE) 200 mg tablet Take 1 Tab by mouth daily. 60 Tab 3  
 spironolactone (ALDACTONE) 25 mg tablet Take 1 Tab by mouth daily. 30 Tab 3  predniSONE (DELTASONE) 10 mg tablet Take 30 mg by mouth daily (with breakfast). 90 Tab 1  
 white petrolatum (Protective Ointment) topical ointment Apply 1 Applicator to affected area two (2) times a week.  insulin glargine (LANTUS) 100 unit/mL injection 10 Units by SubCUTAneous route daily.  1 Vial 2  
  pantoprazole (PROTONIX) 40 mg tablet Take 1 Tab by mouth Before breakfast and dinner. 60 Tab 0  
 therapeutic multivitamin (THERAGRAN) tablet Take 1 Tab by mouth daily. 30 Tab 1  
 magnesium oxide (MAG-OX) 400 mg tablet Take 1 Tab by mouth daily. 30 Tab 1  
 roflumilast (DALIRESP) 250 mcg tab Take 250 mcg by mouth daily. 30 Tab 0  
 tolterodine (DETROL) 1 mg tablet Take 1 Tab by mouth.  traMADol (ULTRAM) 50 mg tablet Take 1 Tab by mouth.  busPIRone (BUSPAR) 15 mg tablet Take 15 mg by mouth two (2) times a day. Indications: repeated episodes of anxiety 60 Tab 2  
 OXYGEN-AIR DELIVERY SYSTEMS 3 L/min by Nasal route continuous. [employer] O2    
 albuterol-ipratropium (DUO-NEB) 2.5 mg-0.5 mg/3 ml nebu 3 mL by Nebulization route every six (6) hours as needed for Wheezing. 30 Nebule 1  cholecalciferol (VITAMIN D3) (1000 Units /25 mcg) tablet Take 1,000 Units by mouth daily.  levothyroxine (SYNTHROID) 100 mcg tablet Take 100 mcg by mouth Daily (before breakfast).  aspirin 81 mg chewable tablet Take 1 Tab by mouth daily (with breakfast). Indications: stroke prevention 30 Tab 0  
 ascorbic acid, vitamin C, (VITAMIN C) 250 mg tablet Take 1 Tab by mouth daily (with breakfast). 30 Tab 0  
 calcium citrate 200 mg (950 mg) tablet Take 1 Tab by mouth two (2) times a day. Indications: post-menopausal osteoporosis prevention 60 Tab 0  
 ferrous sulfate 325 mg (65 mg iron) tablet Take 1 Tab by mouth daily (with breakfast). 30 Tab 0  
 acetaminophen (TYLENOL) 325 mg tablet Take 2 Tabs by mouth every four (4) hours as needed for Pain. Indications: pain 60 Tab 0  
 brief disposable (ADULT) misc by Does Not Apply route. 1 Package 0  
 polyethylene glycol (MIRALAX) 17 gram/dose powder Take 17 g by mouth daily. 1 tablespoon with 8 oz of water daily 289 g 0  
 BD INSULIN SYRINGE ULTRA-FINE 1 mL 31 gauge x 5/16 syrg  rosuvastatin (CRESTOR) 5 mg tablet TAKE ONE TABLET NIGHTLY (Patient taking differently: Take 5 mg by mouth nightly. TAKE ONE TABLET NIGHTLY) 90 Tab 3  
 insulin lispro (HUMALOG) 100 unit/mL injection To be given 15 min before meals: < 150 - None, 151-200 - 2 u, 201-250 - 4 u, 251-300 - 6 u, 301-350 - 8 u, 351-400 - 10 u, >400 - 12 u (Patient taking differently: by SubCUTAneous route three (3) times daily as needed for Other (subcutaneous three times daily as needed before meals per sliding scale. ). To be given 15 min before meals: < 150 - None, 151-200 - 2 u, 201-250 - 4 u, 251-300 - 6 u, 301-350 - 8 u, 351-400 - 10 u, >400 - 12 u) 1 Vial 0  
 sertraline (ZOLOFT) 50 mg tablet Take 100 mg by mouth daily. Indications: bipolar depression Past History Past Medical History: 
Past Medical History:  
Diagnosis Date  Abnormally low high density lipoprotein (HDL) cholesterol with hypertriglyceridemia Lipid profile (11/6/2016) showed , , HDL 38, LDL 37  Acute blood loss as cause of postoperative anemia 12/12/2019  Anxiety  Bipolar affective disorder ([address] 12/5/2012  Cellulitis of right forearm 05/04/2017  Chronic back pain  Chronic obstructive pulmonary disease (COPD) ([address] SOB, on [place] O2 Recent admission with psychosis  Chronic respiratory failure with hypoxia ([address] On home oxygen inhalation at 3 LPM via NC  
 Contusion of left elbow 5/4/2017  Depression  Diabetic neuropathy associated with type 2 diabetes mellitus ([address]  Diastolic dysfunction without heart failure Stable on diuretics  Falls frequently  Gastroesophageal reflux disease with hiatal hernia  Generalized osteoarthritis of multiple sites  Gout On Allopurinol  History of acute renal failure 5/31/2013  History of back injury   
 jumped out of second story window  History of fracture due to fall 12/11/2019  
 History of hepatitis C   
 treated  History of penicillin allergy  History of vitamin D deficiency 5/10/2017  Hypertensive heart disease without heart failure Better controlled  Hyperuricemia 2017  Hypothyroidism  Hypoxemia requiring supplemental oxygen 2014  Intravenous drug user 2017  Long term current use of aspirin  Memory difficulty  Menopause  Mixed connective tissue disease ([facility] 5/10/2017  Obesity, Class I, BMI 30-34.9  Olecranon bursitis of right elbow 2017  Opioid dependence ([facility] On Methadone  Recurrent genital herpes 2013  Right Achilles tendinitis 2017  Sarcoidosis  Sickle cell trait ([facility]  Tobacco use disorder  Type 2 diabetes mellitus with diabetic neuropathy, with long-term current use of insulin ([facility] HbA1c (2019) = 7.1  Urge urinary incontinence 5/10/2017  Venous insufficiency  Wears glasses Past Surgical History: 
Past Surgical History:  
[facility]  SECTION    
 HX CYST REMOVAL Left 1979 Left foot  HX OTHER SURGICAL Left 2017 S/P incision and drainage of the abscess of the left hand and the left foot (2017 - Dr. House Graft. Andreina Cheek)  HX THORACIC LAMINECTOMY  2019 S/P T10, T9, T8 laminectomy; T7, T8, T9, T10, T11, T12 posterior thoracic fusion; segmental spinal instrumentation; K2M Davis type, T7-T8, T11-T12 (2019 - Dr. Alma Rosa Cazares)  HX TUBAL LIGATION Family History: 
Family History Problem Relation Age of Onset  Seizures Other  Diabetes Mother  Hypertension Mother  Heart Disease Mother  Cancer Mother  Diabetes Father  Stroke Father  Heart Disease Father  Headache Sister  Depression Neg Hx  Suicide Neg Hx  Psychotic Disorder Neg Hx  Substance Abuse Neg Hx  Dementia Neg Hx Social History: 
Social History Tobacco Use  Smoking status: Current Every Day Smoker Packs/day: 1.00 Years: 30.00 Pack years: 30.00 Types: Cigarettes Start date: 12/2/1969  Smokeless tobacco: Never Used Substance Use Topics  Alcohol use: No  
 Drug use: No  
  Types: Cocaine, Heroin Comment: +Cocaine Screen 2013 Allergies: Allergies Allergen Reactions  Moxifloxacin Rash  Pcn [Penicillins] Swelling  Sulfa (Sulfonamide Antibiotics) Swelling Review of Systems Review of Systems Constitutional: Negative for chills, fatigue and fever. HENT: Negative for congestion and sore throat. Eyes: Negative for visual disturbance. Respiratory: Positive for cough and shortness of breath. Cardiovascular: Negative for chest pain and palpitations. Gastrointestinal: Negative for abdominal pain, nausea and vomiting. Endocrine: Negative for polydipsia and polyuria. Genitourinary: Negative for dysuria and hematuria. Musculoskeletal: Negative for back pain and myalgias. Neurological: Negative for dizziness, weakness, light-headedness and headaches. Hematological: Does not bruise/bleed easily. Psychiatric/Behavioral: Negative for agitation and confusion. Physical Exam  
 
Vitals:  
 05/20/20 1915 05/20/20 1930 05/20/20 2115 05/20/20 2216 BP: (!) 180/120 (!) 173/115 91/66 Pulse: (!) 123 (!) 127 80 Resp:  24 20 Temp:      
SpO2: 99% 100% Weight:    73.9 kg (163 lb) Physical Exam 
Vitals signs and nursing note reviewed. Constitutional:   
   General: She is in acute distress. Appearance: She is obese. HENT:  
   Head: Normocephalic and atraumatic. Mouth/Throat:  
   Mouth: Mucous membranes are moist.  
   Pharynx: Oropharynx is clear. Eyes:  
   Extraocular Movements: Extraocular movements intact. Pupils: Pupils are equal, round, and reactive to light. Neck: Musculoskeletal: Normal range of motion and neck supple. Cardiovascular:  
   Rate and Rhythm: Regular rhythm. Tachycardia present. Pulmonary:  
   Effort: Respiratory distress present. Breath sounds: Examination of the right-upper field reveals wheezing. Examination of the right-middle field reveals wheezing. Examination of the right-lower field reveals wheezing. Examination of the left-lower field reveals wheezing. Wheezing present. Chest:  
   Chest wall: No tenderness. Abdominal:  
   General: Bowel sounds are normal.  
   Palpations: Abdomen is soft. Musculoskeletal: Normal range of motion. Right lower leg: Edema present. Left lower leg: Edema present. Skin: 
   Capillary Refill: Capillary refill takes less than 2 seconds. Neurological:  
   General: No focal deficit present. Mental Status: She is alert. Psychiatric:     
   Mood and Affect: Mood is anxious. Behavior: Behavior normal.  
 
 
 
 
Diagnostic Study Results Labs - Recent Results (from the past 12 hour(s)) EKG, 12 LEAD, INITIAL Collection Time: 05/20/20  6:42 PM  
Result Value Ref Range Ventricular Rate 96 BPM  
 Atrial Rate 96 BPM  
 P-R Interval 118 ms QRS Duration 80 ms  
 Q-T Interval 376 ms QTC Calculation (Bezet) 475 ms Calculated P Axis 55 degrees Calculated R Axis 36 degrees Calculated T Axis 59 degrees Diagnosis Normal sinus rhythm Left atrial enlargement Septal infarct , age undetermined Abnormal ECG When compared with ECG of 16-MAY-2020 11:38, 
Previous ECG has undetermined rhythm, needs review ST less depressed in Anterior leads CBC WITH AUTOMATED DIFF Collection Time: 05/20/20  7:21 PM  
Result Value Ref Range WBC 17.5 (H) 4.6 - 13.2 K/uL  
 RBC 4.48 4.20 - 5.30 M/uL  
 HGB 11.4 (L) 12.0 - 16.0 g/dL HCT 37.8 35.0 - 45.0 % MCV 84.4 74.0 - 97.0 FL  
 MCH 25.4 24.0 - 34.0 PG  
 MCHC 30.2 (L) 31.0 - 37.0 g/dL  
 RDW 17.1 (H) 11.6 - 14.5 % PLATELET 583 920 - 018 K/uL MPV 9.1 (L) 9.2 - 11.8 FL  
 NEUTROPHILS 67 40 - 73 % LYMPHOCYTES 27 21 - 52 % MONOCYTES 6 3 - 10 % EOSINOPHILS 0 0 - 5 % BASOPHILS 0 0 - 2 %  
 ABS. NEUTROPHILS 11.8 (H) 1.8 - 8.0 K/UL  
 ABS. LYMPHOCYTES 4.7 (H) 0.9 - 3.6 K/UL  
 ABS. MONOCYTES 1.0 0.05 - 1.2 K/UL  
 ABS. EOSINOPHILS 0.1 0.0 - 0.4 K/UL  
 ABS. BASOPHILS 0.0 0.0 - 0.1 K/UL  
 DF AUTOMATED METABOLIC PANEL, BASIC Collection Time: 05/20/20  7:21 PM  
Result Value Ref Range Sodium 142 136 - 145 mmol/L Potassium 3.0 (L) 3.5 - 5.5 mmol/L Chloride 101 100 - 111 mmol/L  
 CO2 39 (H) 21 - 32 mmol/L Anion gap 2 (L) 3.0 - 18 mmol/L Glucose 251 (H) 74 - 99 mg/dL BUN 7 7.0 - 18 MG/DL Creatinine 0.86 0.6 - 1.3 MG/DL  
 BUN/Creatinine ratio 8 (L) 12 - 20 GFR est AA >60 >60 ml/min/1.73m2 GFR est non-AA >60 >60 ml/min/1.73m2 Calcium 8.6 8.5 - 10.1 MG/DL MAGNESIUM Collection Time: 05/20/20  7:21 PM  
Result Value Ref Range Magnesium 1.6 1.6 - 2.6 mg/dL CARDIAC PANEL,(CK, CKMB & TROPONIN) Collection Time: 05/20/20  7:21 PM  
Result Value Ref Range CK - MB 2.1 <3.6 ng/ml CK-MB Index 3.1 0.0 - 4.0 % CK 68 26 - 192 U/L Troponin-I, QT 0.03 0.0 - 0.045 NG/ML  
GLUCOSE, POC Collection Time: 05/20/20  7:23 PM  
Result Value Ref Range Glucose (POC) 273 (H) 70 - 110 mg/dL POC LACTIC ACID Collection Time: 05/20/20  7:27 PM  
Result Value Ref Range Lactic Acid (POC) 1.89 0.40 - 2.00 mmol/L Radiologic Studies -  
CT ABD PELV WO CONT Final Result IMPRESSION:  
  
1. New mild groundglass lung process in the right middle lobe. This has features  
that could be representative of COVID-19 pneumonia. The lung is not fully  
assessed on this abdomen study. Many other processes can have a similar  
appearance. 2. Other lung processes are improving over time. 3. No acute findings within the abdomen or pelvis. 4. Stable splenic lesions. 5. Developing subtle compression fractures versus degenerative Schmorl's nodes  
at L3 and L4. XR CHEST PORT    (Results Pending) CT Results  (Last 48 hours) 05/20/20 2025  CT ABD PELV WO CONT Final result Impression:  IMPRESSION:  
   
1. New mild groundglass lung process in the right middle lobe. This has features  
that could be representative of COVID-19 pneumonia. The lung is not fully  
assessed on this abdomen study. Many other processes can have a similar  
appearance. 2. Other lung processes are improving over time. 3. No acute findings within the abdomen or pelvis. 4. Stable splenic lesions. 5. Developing subtle compression fractures versus degenerative Schmorl's nodes  
at L3 and L4. Narrative:  CT ABDOMEN AND PELVIS WITHOUT ENHANCEMENT INDICATION: Acute tachycardia and hypertension. Abdominal distention and  
tenderness on exam.  
   
TECHNIQUE: Axial images obtained of the abdomen and pelvis without intravenous  
contrast. Coronal and sagittal reformatted images were obtained. All CT scans  
are performed using dose optimization techniques as appropriate to the performed  
exam including the following: Automated exposure control, adjustment of mA  
and/or kV according to patient size, and use of iterative reconstructive  
technique. COMPARISON: Ultrasound abdomen limited 5/18/2020, 5/16/2020 CTA chest 2/29/2020 CT chest, abdomen and pelvis. FINDINGS:   
Lung Base: Improved aeration of the left lung base atelectasis or consolidation,  
now mild. Stable mild peribronchial consolidation or atelectasis in the right  
lung base appearing stable to the prior but improved from 2/2020. New small  
sites of groundglass opacity in the right middle lobe on image 4-7, one of which  
is peripheral. Trace pleural fluid bilaterally. Liver: Unremarkable. Biliary: Unremarkable. Spleen: Medial splenic dome hypodensity measures 3.4 cm. Inferior splenic  
hypodensity measures 2.4 cm. No change. Pancreas: Unremarkable. Adrenal Glands: Unremarkable. Kidneys: Unremarkable. Bowel: Rectosigmoid junction surgical anastomosis. Colostomy. No evidence of  
obstruction. Mild to moderate right colonic stool. Normal appendix. Bladder/ Pelvic Organs: Unremarkable. Peritoneum/ Retroperitoneum: Unremarkable. Lymph Nodes: Unremarkable. Vessels: Unremarkable for age. Bones/Soft tissues: Scattered edema in the subcutaneous tissues . Thoracic  
fusion hardware. Chronic bilateral rib fracture deformities. Stable L2 mild  
compression fracture deformity. Minimal superior endplate fracture deformities  
or developing in the superior endplates of L3 and L4 towards the left, versus  
new degenerative Schmorl's nodes. 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  
heads. CXR Results  (Last 48 hours) None Medical Decision Making I am the first provider for this patient. I reviewed the vital signs, available nursing notes, past medical history, past surgical history, family history and social history. EKG: Interpreted by the EP. Time Interpreted: 9269 Rate: 96 
 Rhythm: normal sinus Interpretation: normal sinus rhythm Comparison: 5/16/20 Records Reviewed: Nursing Notes, Old Medical Records, Previous electrocardiograms, Previous Radiology Studies and Previous Laboratory Studies ED Course:  
ED Course as of May 20 2346 Wed May 20, 2020  
2035 Leukocytosis noted. Patient was discharged with a white count of 9.9. Concern for sepsis as patient came in hypoxic and tachycardic now has elevated white count. Patient's abdomen noted to be significantly more distended from previous ER visit. Will scan patient's abdomen to evaluate for source of infection. WBC(!): 17.5 [SJ] 2226 Patient returned from CT and her vital signs improved. Patient now has blood pressure of 86/66, but Cardene drip was never started. [SJ] ED Course User Index [SJ] Breanne Pruitt MD  
 
 
 
 Disposition: 
Admit DISCHARGE NOTE:  
Pt has been reexamined. Patient has no new complaints, changes, or physical findings. Care plan outlined and precautions discussed. Results of labs and imaging were reviewed with the patient. Will admit patient for new right middle lobe pneumonia, sepsis, as well as possible COVID rule out. Follow-up Information None Current Discharge Medication List  
  
 
 
Provider Notes (Medical Decision Making):  
73-year-old female who presents with respiratory distress with hypoxia to 85% on room air. EMS the patient on 15 L nonrebreather improved her saturations to 100%. Upon arrival to the ER patient is in moderate distress. Patient was recently admitted and doing her initial work-up in the ER for that admission she was given Narcan with improvement. Because of this history patient was given 1 mg of Narcan in the emergency department. She then became tachycardic and hypertensive with diaphoresis. Patient is likely having withdrawals, but she met sirs criteria and had elevated white count of 17. Her abdomen was also distended compared to previous admission. Concern for possible acute infection the abdomen. CT scan of the abdomen was negative for acute process. Patient improved upon return from CT scan, but Ativan was never given. Difficult to say if her tachycardic and hypertensive episode was due to withdrawals. Patient became hypotensive after returning from CT. Her blood work reveals hypokalemia so her potassium was replaced. She also developed prolonged QTC on the monitor so she was given 2 g of magnesium. Patient given 30 cc/kg bolus for hypotension. Pending response to this bolus patient may need central line for resuscitation. Blood cultures were drawn and antibiotics were started. Admission pending hypotension resuscitation. Patient may need ICU admission if her blood pressure does not respond to fluid bolus. Diagnosis Clinical Impression: 1. Sepsis, due to unspecified organism, unspecified whether acute organ dysfunction present ([facility] 2. Pneumonia of right middle lobe due to infectious organism 3. Person under investigation for COVID-19

## 2020-05-21 NOTE — PROGRESS NOTES
Reason for Readmission:    HCAP (healthcare-associated pneumonia) [J18.9] Suspected COVID-19 virus infection [Z20.828] RRAT Score and Risk Level:      57% Level of Readmission:    2   (1-3) (1 - First Readmission, 2- Second Readmission within 30 days or multiple admissions over previous 90 days, 3- Greater than two readmissions within 30 days or multiple admissions over previous 90 days) Care Conference scheduled:   (consider for all patient's with readmission level of 2, should definitely be scheduled for all patients with readmission level of 3) Resources/supports as identified by patient/family:  No issues per daughter Top Challenges facing patient (as identified by patient/family and CM): Finances/Medication cost?     No issues [employer] Support system or lack thereof? Brother, friend, personal care, daughter Living arrangements? Lives with friend and has personal care Self-care/ADLs/Cognition? Needs assistance with ADLs Current Advanced Directive/Advance Care Plan:   
        
Plan for utilizing home health:   yes, [address] [facility]. Will need resumption of services order at discharge Likelihood of additional readmission:    
          
Initial assessment completed with patient. Cognitive status of patient: oriented to time, place, person and situation.  
  
Face sheet information confirmed:  yes. The patient designates Jam Villareal, daughter to participate in her discharge plan and to receive any needed information. This patient lives in a single family home with patient and friend. Patient is not able to navigate steps as needed. Prior to hospitalization, patient was considered to be independent with ADLs/IADLS : no .  If not independent,  patient needs assist with : dressing, bathing, food preparation, cooking, toileting and grooming 
  
Patient has a current ACP document on file: no 
 The pt will need medci will be available to transport patient home upon discharge. The patient already has Lisa [place], W/C, hospital bed and oxygen through [facility], and  medical equipment available in the home.  
  
Patient is currently active with home health. If active, agency name is [facility]. Patient has not stayed in a skilled nursing facility or rehab.     
  
This patient is on dialysis :no 
  
List of available Home Health agencies were provided and reviewed with the patient prior to discharge. Freedom of choice signed: yes, for [facility]. Currently, the discharge plan is Home with [address] [place] [address] Fredrick Jules. Pt gets personal care from [facility]; 10 hours a day and her brother and friend assist during the other times 
  The patient states that she can obtain her medications from the pharmacy, and take her medications as directed. 
  
Patient's current insurance is medicaid. 
  
 
Care Management Interventions PCP Verified by CM: Yes Mode of Transport at Discharge: [facility] Transition of Care Consult (CM Consult): Home Health [address] [place] [address] Road: Yes Discharge Durable Medical Equipment: No 
Physical Therapy Consult: Yes Occupational Therapy Consult: Yes Current Support Network: Lives with Caregiver, Family Lives [place] Confirm Follow Up Transport: Other (see comment)(medicaid transport) The Plan for Transition of Care is Related to the Following Treatment Goals : home health The Patient and/or Patient Representative was Provided with a Choice of Provider and Agrees with the Discharge Plan?: Yes Freedom of Choice List was Provided with Basic Dialogue that Supports the Patient's Individualized Plan of Care/Goals, Treatment Preferences and Shares the Quality Data Associated with the Providers?: Yes Discharge Location Discharge Placement: Home with home health DARIA Rid[place], [facility]- [phone]

## 2020-05-21 NOTE — PROGRESS NOTES
Patient is not available to be assessed at this time. Due to COVID-19.  [facility]  
[phone]

## 2020-05-21 NOTE — PROGRESS NOTES
Pt seen & evaluated in ER - Admitted this AM for SOB 2y to HCAP - started on broad spectrum IV Abx. H/o COPD - continue bronchodilators T2DM PAF - continue amiodarone & eliquis Suspected COVID 19 infn - droplet plus isolation, test sent , await results Elevated trop - NSTEMI - Cardiology consulted, continue Heparin gtt, ASA & statins Resume other home meds Will continue to follow

## 2020-05-21 NOTE — ACP (ADVANCE CARE PLANNING)
Pt referred to [employer] office to discuss ACP. Pt has 3 children and a brother. Juan Bautista, DARIA, [place]- [phone]

## 2020-05-21 NOTE — ROUTINE PROCESS
TRANSFER - OUT REPORT: 
 
Verbal report given to Keith Sutherland on Jose Astudillo  being transferred to [address] [place] [address] for routine progression of care Report consisted of patients Situation, Background, Assessment and  
Recommendations(SBAR). Information from the following report(s) SBAR, ED Summary and MAR was reviewed with the receiving nurse. Lines:  
Peripheral IV 05/21/20 Left;Upper (Active) Peripheral IV 05/21/20 Posterior;Right Hand (Active) Opportunity for questions and clarification was provided. Patient transported with: 
 O2 @ 3 liters Tech

## 2020-05-21 NOTE — ED NOTES
Do not care for the patient from Dr. Ching Manzano pending reevaluation. Sound like the patient was presenting initially as methadone overdose and hypoxia followed by reversal and opioid withdrawal.  However the patient became hypotensive. Looking at her labs she had an elevated white count on the CT showed concern for pneumonia versus COVID-19. Patient was recently hospitalized although her test was negative at that point I think she should be treated for H CAP as well as repeating her COVID-19 test.  After her 30 cc/kg bolus her blood pressure maintained stable in the ER. I think she stable to be admitted to the floor.

## 2020-05-21 NOTE — HOME CARE
Patient active to [facility]. Resumption of care order noted for SN, PT, OT, and HHA. [facility] will continue to follow. Nicolette Santamaria LPN  
[facility] Liaison [phone]

## 2020-05-22 NOTE — PROGRESS NOTES
Patient switched to a new pressure bed. While in bed her weight was 163#. Ostomy changed Stoma normal in color.

## 2020-05-22 NOTE — ROUTINE PROCESS
Care assumed from [place], oriented X 4. Nil obvious respiratory distress, utilizing 3 L O2 via nasal canula. Colostomy bag empty, midline dressing dry and intact. Purewick in place, clear yellow urine in cannister. Heparin drip infusing at 890 units per hour. Frequent uncontrolled movement noted. Dressing to sacrum dry and intact.

## 2020-05-22 NOTE — PROGRESS NOTES
[facility] Progress Note Patient: Elian Love Age: 62 y.o. : 1955 MR#: [id number] SSN: [id number] Date/Time: 2020 Subjective: Patient feeling a lot better today, denies any shortness of breath, no cough, no chest pain. Patient states she went to take her methadone and she smoked 3 to 4 cigarettes and then came back home did not feel well. Patient denies taking extra street drugs or extra methadone. Assessment/Plan: 1. Pneumonia, Ground glass patten: suspect COVID, recent COVID testing negative, will continue empiric antibiotics, monitor markers and follow-up testing. 2. NSTEMI: Continue IV heparin, cardiology recommends cardiac work-up once COVID testing back. 3. Acute on chronic diastolic HF: Patient diuresed well with Lasix, agree with changing Lasix to p.o. tomorrow. 4. Hypokalemia: We will replace 5. chronic respiratory failure with hypoxia: Continue home O2 3 L 
6. COPD, no exacerbation: Continue BD and O2 supplement 7. Pulmonary HTN, moderate to severe: Continue O2 supplement 8. Sarcoidosis with chronic steroid: Continue p.o. steroids 9. Polysubstance abuse: Advised cessation 10. Paroxysmal AFIB, in sinus: on Eliquis at home but currently held since patient on heparin drip. 11. Active tobacco smoking: Advised cessation 12. Hypothyroidism: Continue Synthroid 13. Hyperglycemia with DM2: Continue SSI 14. Sickle cell trait with anemia, iron deficiency anemia : H&H stable 15. Midline abd wound, no infection: wound care following 16. T6 compression fracture: noted on  CTA Case discussed with:  [x]Patient  []Family  [x]Nursing  []Case Management DVT Prophylaxis:  []Lovenox  [x]Hep IV[]SCDs  []Coumadin   []Eliquis/Xarelto Objective:  
VS:  
Visit Vitals BP 98/68 (BP 1 Location: Right arm, BP Patient Position: At rest) Pulse 72 Temp 99.2 °F (37.3 °C) Resp 18 Wt 73.9 kg (163 lb) LMP 2012 (Exact Date) SpO2 97% BMI 29.81 kg/m² Tmax/24hrs: Temp (24hrs), Av.3 °F (36.8 °C), Min:97 °F (36.1 °C), Max:99.3 °F (37.4 °C) IOBRIEF Intake/Output Summary (Last 24 hours) at 2020 1242 Last data filed at 2020 1689 Gross per 24 hour Intake 920 ml Output 1125 ml Net -205 ml General:  Alert, cooperative, no acute distress HEENT: PERRLA, anicteric sclerae. Pulmonary:  CTA Bilaterally. No Wheezing/Rales. Cardiovascular: Regular rate and Rhythm. GI:  Soft, Non distended, Non tender. + Bowel sounds. Extremities:  No edema. No calf tenderness. Psych: Good insight. Not anxious or agitated. Neurologic: Alert and oriented X 3. Moves all ext. Additional: 
 
Medications:  
Current Facility-Administered Medications Medication Dose Route Frequency  [START ON 2020] Vancomycin TROUGH level to be drawn  at 8:30 -- BEFORE giving the next dose due at 9:00. thanks! Other ONCE  potassium bicarb-citric acid (EFFER-K) tablet 20 mEq  20 mEq Oral DAILY  furosemide (LASIX) injection 20 mg  20 mg IntraVENous BID  [START ON 2020] furosemide (LASIX) tablet 20 mg  20 mg Oral DAILY  potassium phosphate 15 mmol in 0.9% sodium chloride 250 mL infusion   IntraVENous ONCE  
 methadone (DOLOPHINE) 5 mg/5 mL oral solution 10 mg  10 mg Oral DAILY  heparin 25,000 units in D5W 250 ml infusion  12-25 Units/kg/hr IntraVENous TITRATE  predniSONE (DELTASONE) tablet 30 mg  30 mg Oral DAILY WITH BREAKFAST  sodium chloride (NS) flush 5-40 mL  5-40 mL IntraVENous Q8H  
 sodium chloride (NS) flush 5-40 mL  5-40 mL IntraVENous PRN  
 acetaminophen (TYLENOL) tablet 650 mg  650 mg Oral Q4H PRN  
 naloxone (NARCAN) injection 0.4 mg  0.4 mg IntraVENous PRN  
 diphenhydrAMINE (BENADRYL) injection 12.5 mg  12.5 mg IntraVENous Q4H PRN  
 ondansetron (ZOFRAN) injection 4 mg  4 mg IntraVENous Q4H PRN  
  bisacodyL (DULCOLAX) suppository 10 mg  10 mg Rectal DAILY PRN  pantoprazole (PROTONIX) 40 mg in 0.9% sodium chloride 10 mL injection  40 mg IntraVENous Q12H  
 insulin glargine (LANTUS) injection 15 Units  0.2 Units/kg SubCUTAneous QHS  insulin lispro (HUMALOG) injection   SubCUTAneous AC&HS  
 glucose chewable tablet 16 g  4 Tab Oral PRN  
 glucagon (GLUCAGEN) injection 1 mg  1 mg IntraMUSCular PRN  
 inhalational spacing device 1 Each  1 Each Does Not Apply PRN  
 amiodarone (CORDARONE) tablet 200 mg  200 mg Oral DAILY  ascorbic acid (vitamin C) (VITAMIN C) tablet 250 mg  250 mg Oral DAILY WITH BREAKFAST  aspirin chewable tablet 81 mg  81 mg Oral DAILY WITH BREAKFAST  roflumilast (DALIRESP) tablet 250 mcg  250 mcg Oral DAILY  sertraline (ZOLOFT) tablet 50 mg  50 mg Oral DAILY  rosuvastatin (CRESTOR) tablet 5 mg  5 mg Oral QHS  glycopyrrolate-formoterol (BEVESPI AEROSPHERE) 9 mcg-4.8 mcg inhaler  2 Puff Inhalation BID RT  
 albuterol (PROVENTIL HFA, VENTOLIN HFA, PROAIR HFA) inhaler 1 Puff  1 Puff Inhalation Q4H RT  
 lactobacillus sp. 50 billion cpu (BIO-K PLUS) capsule 1 Cap  1 Cap Oral DAILY  zinc oxide-vitamin b5-vit e (BALMEX) cream   Topical DAILY PRN  
 guaiFENesin-dextromethorphan (ROBITUSSIN DM) 100-10 mg/5 mL syrup 10 mL  10 mL Oral Q6H PRN  
 levothyroxine (SYNTHROID) tablet 100 mcg  100 mcg Oral 6am  
 vancomycin (VANCOCIN) 1,000 mg in 0.9% sodium chloride (MBP/ADV) 250 mL adv  1,000 mg IntraVENous Q12H  
 aztreonam (AZACTAM) 2 g in 0.9% sodium chloride (MBP/ADV) 100 mL MBP  2 g IntraVENous TID  dextrose 10% infusion 125-250 mL  125-250 mL IntraVENous PRN Labs:   
Recent Results (from the past 24 hour(s)) GLUCOSE, POC Collection Time: 05/21/20  3:13 PM  
Result Value Ref Range Glucose (POC) 299 (H) 70 - 110 mg/dL PTT Collection Time: 05/21/20  3:43 PM  
Result Value Ref Range  aPTT 80.8 (H) 23.0 - 36.4 SEC  
TROPONIN I  
 Collection Time: 05/21/20  3:43 PM  
Result Value Ref Range Troponin-I, QT 0.49 (H) 0.0 - 0.045 NG/ML  
GLUCOSE, POC Collection Time: 05/21/20  8:46 PM  
Result Value Ref Range Glucose (POC) 240 (H) 70 - 110 mg/dL PTT Collection Time: 05/22/20  3:16 AM  
Result Value Ref Range aPTT 54.2 (H) 23.0 - 36.4 SEC FIBRINOGEN Collection Time: 05/22/20  3:16 AM  
Result Value Ref Range Fibrinogen 407 210 - 451 mg/dL PROTHROMBIN TIME + INR Collection Time: 05/22/20  3:16 AM  
Result Value Ref Range Prothrombin time 14.0 11.5 - 15.2 sec INR 1.1 0.8 - 1.2 METABOLIC PANEL, COMPREHENSIVE Collection Time: 05/22/20  3:16 AM  
Result Value Ref Range Sodium 139 136 - 145 mmol/L Potassium 2.9 (LL) 3.5 - 5.5 mmol/L Chloride 100 100 - 111 mmol/L  
 CO2 33 (H) 21 - 32 mmol/L Anion gap 6 3.0 - 18 mmol/L Glucose 132 (H) 74 - 99 mg/dL BUN 10 7.0 - 18 MG/DL Creatinine 0.74 0.6 - 1.3 MG/DL  
 BUN/Creatinine ratio 14 12 - 20 GFR est AA >60 >60 ml/min/1.73m2 GFR est non-AA >60 >60 ml/min/1.73m2 Calcium 8.0 (L) 8.5 - 10.1 MG/DL Bilirubin, total 0.6 0.2 - 1.0 MG/DL  
 ALT (SGPT) 31 13 - 56 U/L  
 AST (SGOT) 26 10 - 38 U/L Alk. phosphatase 56 45 - 117 U/L Protein, total 6.1 (L) 6.4 - 8.2 g/dL Albumin 2.5 (L) 3.4 - 5.0 g/dL Globulin 3.6 2.0 - 4.0 g/dL A-G Ratio 0.7 (L) 0.8 - 1.7    
CBC WITH AUTOMATED DIFF Collection Time: 05/22/20  3:16 AM  
Result Value Ref Range WBC 12.0 4.6 - 13.2 K/uL  
 RBC 3.77 (L) 4.20 - 5.30 M/uL HGB 9.5 (L) 12.0 - 16.0 g/dL HCT 31.5 (L) 35.0 - 45.0 % MCV 83.6 74.0 - 97.0 FL  
 MCH 25.2 24.0 - 34.0 PG  
 MCHC 30.2 (L) 31.0 - 37.0 g/dL  
 RDW 17.1 (H) 11.6 - 14.5 % PLATELET 686 913 - 206 K/uL MPV 9.6 9.2 - 11.8 FL  
 NEUTROPHILS 74 (H) 40 - 73 % LYMPHOCYTES 19 (L) 21 - 52 % MONOCYTES 6 3 - 10 % EOSINOPHILS 1 0 - 5 % BASOPHILS 0 0 - 2 %  
 ABS. NEUTROPHILS 8.9 (H) 1.8 - 8.0 K/UL ABS. LYMPHOCYTES 2.3 0.9 - 3.6 K/UL  
 ABS. MONOCYTES 0.7 0.05 - 1.2 K/UL  
 ABS. EOSINOPHILS 0.1 0.0 - 0.4 K/UL  
 ABS. BASOPHILS 0.0 0.0 - 0.1 K/UL  
 DF AUTOMATED    
LD Collection Time: 05/22/20  3:16 AM  
Result Value Ref Range  (H) 81 - 234 U/L  
D DIMER Collection Time: 05/22/20  3:16 AM  
Result Value Ref Range D DIMER 0.57 (H) <0.46 ug/ml(FEU) FERRITIN Collection Time: 05/22/20  3:16 AM  
Result Value Ref Range Ferritin 126 8 - 388 NG/ML  
C REACTIVE PROTEIN, QT Collection Time: 05/22/20  3:16 AM  
Result Value Ref Range C-Reactive protein 2.8 (H) 0 - 0.3 mg/dL PHOSPHORUS Collection Time: 05/22/20  3:16 AM  
Result Value Ref Range Phosphorus 2.1 (L) 2.5 - 4.9 MG/DL  
GLUCOSE, POC Collection Time: 05/22/20  8:01 AM  
Result Value Ref Range Glucose (POC) 113 (H) 70 - 110 mg/dL GLUCOSE, POC Collection Time: 05/22/20 11:31 AM  
Result Value Ref Range Glucose (POC) 169 (H) 70 - 110 mg/dL Signed By: Amanda Fernando MD   
 May 22, 2020 Disclaimer: Sections of this note are dictated using utilizing voice recognition software. Minor typographical errors may be present. If questions arise, please do not hesitate to contact me or call our department.

## 2020-05-22 NOTE — ROUTINE PROCESS
Bedside shift change report given to JENNY Menezes (oncoming nurse) by Sarah Malave RN (offgoing nurse). Report included the following information SBAR, Kardex, Intake/Output, Recent Results and Cardiac Rhythm NSR.

## 2020-05-22 NOTE — ROUTINE PROCESS
Assumed patient care. Bedside shift report received from Jem. Patient in bed, awake, alert and oriented x3. Denies pain or discomforts at this time. Heparin drip infusing well, rate and dose verified with off going RNCall light placed within reach. Bed in low position. 7:20 AM - Bedside shift change report given to Artemio Muir (oncoming nurse) by Gaby Green RN (offgoing nurse). Report given with SBAR, Kardex, Intake/Output, MAR and Recent Results.

## 2020-05-22 NOTE — PROGRESS NOTES
[facility] 
 
 
CARDIOLOGY PROGRESS NOTE 
RECS: 
 
 
1. NSTEMI- with positive troponin now down trending- patient is chest pain free. Continue heparin drip, asa, statin. Monitor for ischemia symptoms. Follow up echo to reassess lvf, PAP. Cardiac w/u when COV-19 r/o and stable 2. Suspected COV-19 3. Possible pneumonia- managed by medical team  
4. Paroxymal Atrial flutter/atrial fibrillation-  maintaining NSR- continue  amiodarone po. Patient was on eliquis at home 5. Acute on Chronic Congestive heart failure-improving well clinically NT pro BNP >4.000 no significant peripheral  Edema. lasix 20 mg bid. Change to po starting tomorrow. 6. Uncontrolled Hypertension- on admission. She was initial placed on Cardene drip.  now with low Normal BP-hold BP medications will monitor. 7. Recent Acute Sigmoid Colon Perforation-S/p colectomy and transverse colostomy on 3/1/20  
8. Hx sarcoidosis- on chronic prednisone  
9. Pulmonary hypertension with PAP 57 mmHg  on echo on 2/20 
10. Hx of COPD, on chronic 3L NC at home. 11. Hx of Cocaine and Heroin Abuse - on methadone. 12. Hypokalemia-replete follow mag level today   
  
Continue supportive care  Will consider cardiac w/u after COVID test result. No acute chest pain or sob. Agree with above management 
  
    
02/29/20 ECHO ADULT FOLLOW-UP OR LIMITED 03/17/2020 3/17/2020  
  Narrative · Normal cavity size and systolic function (ejection fraction normal). Estimated left ventricular ejection fraction is 55 - 60%. Visually  
measured ejection fraction. No regional wall motion abnormality noted. · Mild tricuspid valve regurgitation is present. · Moderate pulmonary hypertension. Pulmonary arterial systolic pressure is 57 mmHg. · Right Ventricle: Normal cavity size and global systolic function. Normal  
wall motion. 
   
    Signed by: Poli Mcgowan MD  
  
 
 
ASSESSMENT: 
Hospital Problems  Date Reviewed: 2/28/2020 Codes Class Noted POA Suspected COVID-19 virus infection ICD-10-CM: Z20.828 ICD-9-CM: V01.79  5/21/2020 Yes * (Principal) HCAP (healthcare-associated pneumonia) ICD-10-CM: J18.9 ICD-9-CM: 450  5/16/2020 Yes PAF (paroxysmal atrial fibrillation) (HCC) ICD-10-CM: I48.0 ICD-9-CM: 427.31  4/8/2020 Yes Overview Signed 4/8/2020  9:53 AM by Sona Yi MD  
  Noted in hospital.  She was started on amiodarone and Eliquis continue current treatment stable Sarcoidosis (Chronic) ICD-10-CM: C29.1 ICD-9-CM: 135  Unknown Yes Chronic obstructive pulmonary disease (COPD) (HCC) (Chronic) ICD-10-CM: J44.9 ICD-9-CM: 696  Unknown Yes Overview Signed 4/23/2013 10:01 AM by Azucena CALLES  
  SOB, on paula O2 Recent admission with psychosis Type 2 diabetes mellitus with diabetic neuropathy, with long-term current use of insulin (HCC) (Chronic) ICD-10-CM: E11.40, Z79.4 ICD-9-CM: 250.60, 357.2, V58.67  Unknown Yes Overview Signed 12/18/2019  2:13 PM by Socorro Ruiz MD  
  HbA1c (12/11/2019) = 7.1 SUBJECTIVE: 
No CP No SOB OBJECTIVE: 
 
VS:  
Visit Vitals BP 98/68 (BP 1 Location: Right arm, BP Patient Position: At rest) Pulse 72 Temp 99.2 °F (37.3 °C) Resp 18 Wt 73.9 kg (163 lb) SpO2 99% BMI 29.81 kg/m² Intake/Output Summary (Last 24 hours) at 5/22/2020 1147 Last data filed at 5/22/2020 0758 Gross per 24 hour Intake 920 ml Output 1125 ml Net -205 ml TELE: normal sinus rhythm General: alert, well developed, chronically ill, pleasant and in no apparent distress HENT: Normocephalic, atraumatic. Normal external eye. Neck :  JVD difficult to assess due to obesity Cardiac:  regular rate and rhythm, S1, S2 normal, no click, no rub Lungs: wheezes R base, L base, diminished breath sounds b/l. Abdomen: Soft, nontender, no masses Extremities:  No c/c/e, peripheral pulses present Labs: Results: Chemistry Recent Labs  
  05/22/20 
5269 05/21/20 
0333 05/20/20 1921 * 165* 251*  143 142  
K 2.9* 3.3* 3.0*  
 106 101 CO2 33* 37* 39* BUN 10 7 7 CREA 0.74 0.63 0.86 CA 8.0* 7.6* 8.6 AGAP 6 0* 2*  
BUCR 14 11* 8* AP 56  --   --   
TP 6.1*  --   --   
ALB 2.5*  --   --   
GLOB 3.6  --   --   
AGRAT 0.7*  --   --   
  
CBC w/Diff Recent Labs  
  05/22/20 0316 05/20/20 1921 WBC 12.0 17.5*  
RBC 3.77* 4.48 HGB 9.5* 11.4* HCT 31.5* 37.8  188 GRANS 74* 67  
LYMPH 19* 27 EOS 1 0 Cardiac Enzymes Recent Labs  
  05/20/20 1921 CPK 68 CKND1 3.1 Coagulation Recent Labs  
  05/22/20 0316 05/21/20 
1543  05/21/20 
9839 PTP 14.0  --   --  15.8* INR 1.1  --   --  1.3* APTT 54.2* 80.8*   < > 31.8  
 < > = values in this interval not displayed. Lipid Panel Lab Results Component Value Date/Time Cholesterol, total 141 09/30/2019 12:00 AM  
 HDL Cholesterol 39 (L) 09/30/2019 12:00 AM  
 LDL, calculated 61 09/30/2019 12:00 AM  
 VLDL, calculated 41 (H) 09/30/2019 12:00 AM  
 Triglyceride 204 (H) 09/30/2019 12:00 AM  
 CHOL/HDL Ratio 3.5 11/06/2016 04:18 AM  
  
BNP No results for input(s): BNPP in the last 72 hours. Liver Enzymes Recent Labs  
  05/22/20 0316 TP 6.1* ALB 2.5* AP 56 SGOT 26 Thyroid Studies Lab Results Component Value Date/Time TSH 0.53 05/17/2020 05:00 AM  
    
 
 
 
Suma REYES Liz:[phone] supervised I have independently evaluated and examined the patient. All relevant labs and testing data's are reviewed. Care plan discussed and updated after review.  
 
Justin Eckert MD

## 2020-05-22 NOTE — ROUTINE PROCESS
Verbal shift change report given to Jak Vidal (oncoming nurse) by Shonda Sood (offgoing nurse). Report included the following information SBAR, Kardex, ED Summary, Procedure Summary, Intake/Output, MAR, Recent Results and Cardiac Rhythm Sinus Rhythm.

## 2020-05-22 NOTE — PROGRESS NOTES
This patient meets the following P&T approved criteria and has been converted from IV to oral therapy for the following medication: Pantoprazole 1. INITIAL IV Therapy Patient has received an initial 48 hours of IV therapy for azithromycin and levetiracetam OR received an initial 24 hours of IV therapy for all other medications. 2. Clinically Stable HR?100 Pulse (Heart Rate): 72 SBP ?90 BP: 98/68 RR ? 24 Resp Rate: 18 Temp < 100.4° F Temp: 99.2 °F (37.3 °C)  
O2 ?90% O2 Sat (%): 97 % 3. Functional GI Tract Please note that a No response means the patient is not a candidate for IV to PO conversion No active NPO order (check order review activity) Yes Taking enteral meds (PO, NG, GT, etc) (check medication activity or order review for a tube) Yes Able to tolerate enteral medications without risk of aspiration  
(check progress notes) Yes Non-tubed patients can swallow without difficulty 
(check progress notes) Yes Eating or tolerating feeds at goal rate  
(check progress notes, order review activity, LDA flowsheet)  Diet =diabetic Yes NG access is available if patient is unconscious Yes NG tube, if present,  is not on continuous suction  
(check progress notes) Yes  
Continuous tube feedings can be interrupted for an extended period of time for the drug administration (e.g. Ciprofloxacin) (check progress notes, order review) Yes No severe or persistent nausea or vomiting      
(check progress notes, use of antiemetics) Yes No malabsorption  syndromes  
(e.g. gastrectomy, intestinal resection, bariatric surgery, uncontrolled diarrhea, short bowel syndrome, ileus, gastrointestinal obstruction) (check progress notes) Yes No active gastrointestinal bleeding    
(check progress notes) Yes 4. Infection Specific Criteria (ABX only) Please note that a No response means the patient is not a candidate for IV to PO conversion WBC normal or normalizing (check labs) Yes WBC = Lab Results Component Value Date/Time WBC 12.0 05/22/2020 03:16 AM  
  
Neutropenia (ANC < 1500 cells/microL) NOT present 
(check labs) Yes No infection with high treatment failure rate 
(e.g. meningitis, brain abscess,orbital cellulitis, other central nervous system infections, endophthalmitis, mediastinitis,  IV TMP/SMX treatment for PCP in AIDs, fungemia, ventriculitis, endocarditis, Pseudomonas pneumonia, intra-abdominal abscess, empyema, necrotizing soft tissue infections, osteomyelitis or post-obstructive pneumonia) (check indication for antibiotic, progress notes) Yes 5. Miscellaneous Criteria Please note that a No response means the patient is not a candidate for IV to PO conversion Not on high doses of vasopressor medications  
     (check medication activity) Yes Not actively seizing or risk of actively seizing  
     (check progress notes) Yes Pharmacist Notes: patient is taking other PO medications Pharmacy will continue to monitor for the duration of therapy to ensure the patient continues to meet protocol criteria and the conversion remains appropriate.  
 
Tanja Griffin, Pharmacist 
5/22/2020 1:21 PM

## 2020-05-22 NOTE — PROGRESS NOTES
Problem: Falls - Risk of 
Goal: *Absence of Falls Description: Document Erica Talbot Fall Risk and appropriate interventions in the flowsheet. Outcome: Progressing Towards Goal 
Note: Fall Risk Interventions: 
  
 
  
 
  
 
Elimination Interventions: Call light in reach, Patient to call for help with toileting needs, Toileting schedule/hourly rounds Problem: Patient Education: Go to Patient Education Activity Goal: Patient/Family Education Outcome: Progressing Towards Goal 
  
Problem: Pressure Injury - Risk of 
Goal: *Prevention of pressure injury Description: Document Florian Scale and appropriate interventions in the flowsheet. Outcome: Progressing Towards Goal 
Note: Pressure Injury Interventions: 
  
 
Moisture Interventions: Absorbent underpads, Apply protective barrier, creams and emollients, Check for incontinence Q2 hours and as needed, Moisture barrier Activity Interventions: PT/OT evaluation Mobility Interventions: HOB 30 degrees or less, Turn and reposition approx. every two hours(pillow and wedges) Nutrition Interventions: Document food/fluid/supplement intake Friction and Shear Interventions: HOB 30 degrees or less, Apply protective barrier, creams and emollients, Foam dressings/transparent film/skin sealants, Minimize layers Problem: Patient Education: Go to Patient Education Activity Goal: Patient/Family Education Outcome: Progressing Towards Goal

## 2020-05-22 NOTE — PROGRESS NOTES
NUTRITION Nursing Referral: Pressure Injury RECOMMENDATIONS / PLAN:  
 
- Add supplements: Marty Drink, BID 
- Update food preferences. - Continue RD inpatient monitoring and evaluation. NUTRITION INTERVENTIONS & DIAGNOSIS:  
 
- Meals/snacks: modified composition - Medical food supplement therapy: initiate Nutrition Diagnosis: Increased nutrient needs protein related to increased demand for wound healing as evidenced by pt with pressure injury ASSESSMENT:  
 
Consent obtained from pt for remote assessment via telephone. COVID-19 rule out pending. Pt reports excellent appetite and meal intake, tolerating diet. Food preferences obtained and pressure injury noted. NSTEMI, cardiology following, diuretic therapy. Electrolytes replaced. Denies changes in ostomy output. Nutritional intake adequate to meet patients estimated nutritional needs:  Yes Diet: DIET DIABETIC CONSISTENT CARB Regular Food Allergies: NKFA Current Appetite: Good Appetite/meal intake prior to admission: Good Feeding Limitations:  [] Swallowing difficulty    [] Chewing difficulty    [] Other: 
Current Meal Intake:  
Patient Vitals for the past 100 hrs: 
 % Diet Eaten 05/22/20 0912 100 % 05/22/20 0550 100 % BM: 5/21-colostomy Skin Integrity: stage 2 pressure injury to sacral 
Edema:   [x] No     [] Yes Pertinent Medications: Reviewed: ascorbic acid, prn dulcolax, lantus (15 units), SSI- normal insulin sensitivity, lactobacillus, levothyroxine, methadone, prn ondansetron, 15 mmol K Phos, steroid, furosemide, pantoprazole, 20 mEq K Bicarb Recent Labs  
  05/22/20 
0316 05/21/20 
0333 05/20/20 
1921  143 142  
K 2.9* 3.3* 3.0*  
 106 101 CO2 33* 37* 39* * 165* 251* BUN 10 7 7 CREA 0.74 0.63 0.86 CA 8.0* 7.6* 8.6 MG  --   --  1.6 PHOS 2.1*  --   --   
ALB 2.5*  --   --   
SGOT 26  --   --   
ALT 31  --   -- Intake/Output Summary (Last 24 hours) at 5/22/2020 1333 Last data filed at 5/22/2020 6042 Gross per 24 hour Intake 920 ml Output 1125 ml Net -205 ml Anthropometrics: 
Ht Readings from Last 1 Encounters:  
05/16/20 5' 2\" (1.575 m) Last 3 Recorded Weights in this Encounter 05/20/20 2216 Weight: 73.9 kg (163 lb) Body mass index is 29.81 kg/m². Weight History: Pt reports a recent few lb wt loss with a previous wt of 165#, consistent with chart review. Weight Metrics 5/20/2020 5/18/2020 5/7/2020 4/8/2020 3/22/2020 2/28/2020 1/22/2020 Weight 163 lb 163 lb 9.6 oz 165 lb 165 lb 165 lb - 171 lb 11.2 oz  
BMI 29.81 kg/m2 29.92 kg/m2 28.32 kg/m2 28.32 kg/m2 28.32 kg/m2 28.57 kg/m2 - Some encounter information is confidential and restricted. Go to Review Flowsheets activity to see all data. Admitting Diagnosis: HCAP (healthcare-associated pneumonia) [J18.9] Suspected COVID-19 virus infection [Z20.828] Pertinent PMHx: COPD, HTN, DM, hepatitis C, sarcoidosis, methadone use, GERD with hiatal hernia, hypothyroidism    
 
Education Needs:        [x] None identified  [] Identified - Not appropriate at this time  []  Identified and addressed - refer to education log Learning Limitations:   [x] None identified  [] Identified Cultural, Gnosticism & ethnic food preferences:  [x] None identified    [] Identified and addressed ESTIMATED NUTRITION NEEDS:  
 
Calories: 0592-4976 kcal (MSJx1.2-1.3) based on  [x]? Actual BW: 74 kg      []? IBW Protein:  gm (1.2-1.5 gm/kg) based on  [x]? Actual BW      []? IBW Fluid: 1 mL/kcal 
  
MONITORING & EVALUATION:  
 
Nutrition Goal(s):  
- PO nutrition intake will meet >75% of patient estimated nutritional needs within the next 7 days.    Outcome: New/Initial goal  
 
Monitoring:   [x] Food and nutrient intake   [x] Food and nutrient administration  [x] Comparative standards   [x] Nutrition-focused physical findings   [x] Anthropometric Measurements   [x] Treatment/therapy   [x] Biochemical data, medical tests, and procedures Previous Recommendations (for follow-up assessments only):  Not Applicable Discharge Planning: No nutritional discharge needs at this time. Participated in care planning, discharge planning, & interdisciplinary rounds as appropriate. Devin Cooper RD Pager: [phone]

## 2020-05-23 PROBLEM — L03.113 CELLULITIS OF ARM, RIGHT: Status: RESOLVED | Noted: 2017-05-04 | Resolved: 2020-01-01

## 2020-05-23 PROBLEM — Z99.3 WHEELCHAIR BOUND: Status: ACTIVE | Noted: 2020-01-01

## 2020-05-23 PROBLEM — I48.0 PAF (PAROXYSMAL ATRIAL FIBRILLATION) (HCC): Chronic | Status: ACTIVE | Noted: 2020-01-01

## 2020-05-23 PROBLEM — G82.20 PARAPLEGIA (HCC): Status: ACTIVE | Noted: 2020-01-01

## 2020-05-23 PROBLEM — L03.119 CELLULITIS AND ABSCESS OF HAND: Status: RESOLVED | Noted: 2017-04-13 | Resolved: 2020-01-01

## 2020-05-23 PROBLEM — I27.20 PULMONARY HYPERTENSION, MODERATE TO SEVERE (HCC): Chronic | Status: ACTIVE | Noted: 2020-01-01

## 2020-05-23 PROBLEM — R77.8 ELEVATED TROPONIN: Status: ACTIVE | Noted: 2020-01-01

## 2020-05-23 PROBLEM — I42.9 CARDIOMYOPATHY (HCC): Status: ACTIVE | Noted: 2020-01-01

## 2020-05-23 PROBLEM — L03.113 CELLULITIS OF RIGHT FOREARM: Status: RESOLVED | Noted: 2017-05-04 | Resolved: 2020-01-01

## 2020-05-23 PROBLEM — L02.519 CELLULITIS AND ABSCESS OF HAND: Status: RESOLVED | Noted: 2017-04-13 | Resolved: 2020-01-01

## 2020-05-23 PROBLEM — R77.8 ELEVATED TROPONIN: Status: RESOLVED | Noted: 2020-01-01 | Resolved: 2020-01-01

## 2020-05-23 PROBLEM — Z43.3 COLOSTOMY CARE (HCC): Status: ACTIVE | Noted: 2020-01-01

## 2020-05-23 PROBLEM — M70.21 OLECRANON BURSITIS OF RIGHT ELBOW: Status: RESOLVED | Noted: 2017-05-04 | Resolved: 2020-01-01

## 2020-05-23 PROBLEM — F60.9 PERSONALITY DISORDER (HCC): Status: ACTIVE | Noted: 2020-01-01

## 2020-05-23 PROBLEM — I50.33 ACUTE ON CHRONIC DIASTOLIC (CONGESTIVE) HEART FAILURE (HCC): Status: ACTIVE | Noted: 2020-01-01

## 2020-05-23 PROBLEM — S22.000A COMPRESSION FRACTURE OF BODY OF THORACIC VERTEBRA (HCC): Chronic | Status: ACTIVE | Noted: 2019-01-01

## 2020-05-23 PROBLEM — J43.2 CENTRILOBULAR EMPHYSEMA (HCC): Status: ACTIVE | Noted: 2020-01-01

## 2020-05-23 PROBLEM — I21.4 NSTEMI (NON-ST ELEVATED MYOCARDIAL INFARCTION) (HCC): Status: ACTIVE | Noted: 2020-01-01

## 2020-05-23 NOTE — PROGRESS NOTES
[facility] Hospitalist Group Progress Note Patient: Dave Orellana Age: 62 y.o. : 1955 MR#: [id number] SSN: [id number] Date/Time: 2020 Subjective:   
Patient is sitting in bed in no apparent distress, denies chest pain, states breathing has improved Assessment/Plan: 1. Pneumonia, continue antibiotics follow cultures 2. NSTEMI: IV heparin, cardiology is following 3. Acute on chronic diastolic HF: Lasix as per cardiology, monitor renal function 4. Hypokalemia: Monitor 5. chronic respiratory failure with hypoxia: Continue home O2 3 L 
6. COPD, without exacerbationcontinue home oxygen and bronchodilators 7. Pulmonary HTN, moderate to severe: Continue O2 supplement 8. Sarcoidosis with chronic steroid: Continue chronic steroids 9. Polysubstance abuse: Counseled cessation of illicit drug use 10. Paroxysmal AFIB, in sinus: Eliquis held on heparin drip 11. Active tobacco smoking: Smoking cessation education 12. Hypothyroidism: Continue Synthroid 13. Hyperglycemia with DM2: Sliding scale insulin 14. Sickle cell trait with anemia, iron deficiency anemia : H&H stable 15. Midline abd wound, no infection: wound care following 16. T6 compression fracture: noted on  CTA 17. COVID 19 Pui-results are pending Discussed with patient, full code Case discussed with:  [x]Patient  []Family  [x]Nursing  []Case Management DVT Prophylaxis:  []Lovenox  [x]Hep IV[]SCDs  []Coumadin   []Eliquis/Xarelto Objective:  
VS:  
Visit Vitals /64 (BP 1 Location: Right arm, BP Patient Position: At rest) Pulse 75 Temp 98.3 °F (36.8 °C) Resp 20 Wt 74.8 kg (165 lb) LMP 2012 (Exact Date) SpO2 92% Breastfeeding No  
BMI 30.18 kg/m² Tmax/24hrs: Temp (24hrs), Av.7 °F (37.1 °C), Min:98.3 °F (36.8 °C), Max:99.5 °F (37.5 °C) IOBRIEF Intake/Output Summary (Last 24 hours) at 2020 1027 Last data filed at 2020 3646 Gross per 24 hour Intake 1565.21 ml Output 1600 ml Net -34.79 ml General:  Awake, alert Cardiovascular:  S1S2+, RRR Pulmonary: Coarse breath sounds bilaterally GI:  Soft, BS+, NT, ND Extremities:  trace edema Medications:  
Current Facility-Administered Medications Medication Dose Route Frequency  Vancomycin Lab Information  1 Each Other Once per day on Sat  potassium bicarb-citric acid (EFFER-K) tablet 20 mEq  20 mEq Oral DAILY  furosemide (LASIX) tablet 20 mg  20 mg Oral DAILY  pantoprazole (PROTONIX) tablet 40 mg  40 mg Oral ACB&D  
 methadone (DOLOPHINE) 10 mg/mL concentrated solution 10 mg  10 mg Oral DAILY  cefepime (MAXIPIME) 2 g in sterile water (preservative free) 10 mL IV syringe  2 g IntraVENous Q8H  
 heparin 25,000 units in D5W 250 ml infusion  12-25 Units/kg/hr IntraVENous TITRATE  predniSONE (DELTASONE) tablet 30 mg  30 mg Oral DAILY WITH BREAKFAST  sodium chloride (NS) flush 5-40 mL  5-40 mL IntraVENous Q8H  
 sodium chloride (NS) flush 5-40 mL  5-40 mL IntraVENous PRN  
 acetaminophen (TYLENOL) tablet 650 mg  650 mg Oral Q4H PRN  
 naloxone (NARCAN) injection 0.4 mg  0.4 mg IntraVENous PRN  
 diphenhydrAMINE (BENADRYL) injection 12.5 mg  12.5 mg IntraVENous Q4H PRN  
 ondansetron (ZOFRAN) injection 4 mg  4 mg IntraVENous Q4H PRN  
 bisacodyL (DULCOLAX) suppository 10 mg  10 mg Rectal DAILY PRN  
 insulin glargine (LANTUS) injection 15 Units  0.2 Units/kg SubCUTAneous QHS  insulin lispro (HUMALOG) injection   SubCUTAneous AC&HS  
 glucose chewable tablet 16 g  4 Tab Oral PRN  
 glucagon (GLUCAGEN) injection 1 mg  1 mg IntraMUSCular PRN  
 inhalational spacing device 1 Each  1 Each Does Not Apply PRN  
 amiodarone (CORDARONE) tablet 200 mg  200 mg Oral DAILY  ascorbic acid (vitamin C) (VITAMIN C) tablet 250 mg  250 mg Oral DAILY WITH BREAKFAST  aspirin chewable tablet 81 mg  81 mg Oral DAILY WITH BREAKFAST  roflumilast (DALIRESP) tablet 250 mcg  250 mcg Oral DAILY  sertraline (ZOLOFT) tablet 50 mg  50 mg Oral DAILY  rosuvastatin (CRESTOR) tablet 5 mg  5 mg Oral QHS  glycopyrrolate-formoterol (BEVESPI AEROSPHERE) 9 mcg-4.8 mcg inhaler  2 Puff Inhalation BID RT  
 albuterol (PROVENTIL HFA, VENTOLIN HFA, PROAIR HFA) inhaler 1 Puff  1 Puff Inhalation Q4H RT  
 lactobacillus sp. 50 billion cpu (BIO-K PLUS) capsule 1 Cap  1 Cap Oral DAILY  zinc oxide-vitamin b5-vit e (BALMEX) cream   Topical DAILY PRN  
 guaiFENesin-dextromethorphan (ROBITUSSIN DM) 100-10 mg/5 mL syrup 10 mL  10 mL Oral Q6H PRN  
 levothyroxine (SYNTHROID) tablet 100 mcg  100 mcg Oral 6am  
 vancomycin (VANCOCIN) 1,000 mg in 0.9% sodium chloride (MBP/ADV) 250 mL adv  1,000 mg IntraVENous Q12H  
 dextrose 10% infusion 125-250 mL  125-250 mL IntraVENous PRN Labs:   
Recent Results (from the past 24 hour(s)) PTT Collection Time: 05/22/20  6:42 PM  
Result Value Ref Range aPTT >180.0 (HH) 23.0 - 36.4 SEC GLUCOSE, POC Collection Time: 05/22/20  9:23 PM  
Result Value Ref Range Glucose (POC) 347 (H) 70 - 110 mg/dL PTT Collection Time: 05/23/20  3:59 AM  
Result Value Ref Range aPTT 42.8 (H) 23.0 - 36.4 SEC FIBRINOGEN Collection Time: 05/23/20  3:59 AM  
Result Value Ref Range Fibrinogen 361 210 - 451 mg/dL PROTHROMBIN TIME + INR Collection Time: 05/23/20  3:59 AM  
Result Value Ref Range Prothrombin time 14.3 11.5 - 15.2 sec INR 1.1 0.8 - 1.2 LD Collection Time: 05/23/20  3:59 AM  
Result Value Ref Range  (H) 81 - 234 U/L  
D DIMER Collection Time: 05/23/20  3:59 AM  
Result Value Ref Range D DIMER 0.51 (H) <0.46 ug/ml(FEU) FERRITIN Collection Time: 05/23/20  3:59 AM  
Result Value Ref Range Ferritin 87 8 - 388 NG/ML  
C REACTIVE PROTEIN, QT Collection Time: 05/23/20  3:59 AM  
Result Value Ref Range C-Reactive protein 1.5 (H) 0 - 0.3 mg/dL MAGNESIUM  
 Collection Time: 05/23/20  3:59 AM  
Result Value Ref Range Magnesium 1.2 (L) 1.6 - 2.6 mg/dL PHOSPHORUS Collection Time: 05/23/20  3:59 AM  
Result Value Ref Range Phosphorus 2.5 2.5 - 4.9 MG/DL  
METABOLIC PANEL, BASIC Collection Time: 05/23/20  3:59 AM  
Result Value Ref Range Sodium 142 136 - 145 mmol/L Potassium 3.9 3.5 - 5.5 mmol/L Chloride 99 (L) 100 - 111 mmol/L  
 CO2 43 (HH) 21 - 32 mmol/L Anion gap 0 (L) 3.0 - 18 mmol/L Glucose 128 (H) 74 - 99 mg/dL BUN 9 7.0 - 18 MG/DL Creatinine 0.58 (L) 0.6 - 1.3 MG/DL  
 BUN/Creatinine ratio 16 12 - 20 GFR est AA >60 >60 ml/min/1.73m2 GFR est non-AA >60 >60 ml/min/1.73m2 Calcium 7.6 (L) 8.5 - 10.1 MG/DL  
CBC WITH AUTOMATED DIFF Collection Time: 05/23/20  3:59 AM  
Result Value Ref Range WBC 10.2 4.6 - 13.2 K/uL  
 RBC 3.48 (L) 4.20 - 5.30 M/uL HGB 8.6 (L) 12.0 - 16.0 g/dL HCT 28.6 (L) 35.0 - 45.0 % MCV 82.2 74.0 - 97.0 FL  
 MCH 24.7 24.0 - 34.0 PG  
 MCHC 30.1 (L) 31.0 - 37.0 g/dL  
 RDW 17.1 (H) 11.6 - 14.5 % PLATELET 625 079 - 262 K/uL MPV 9.7 9.2 - 11.8 FL  
 NEUTROPHILS 73 40 - 73 % LYMPHOCYTES 21 21 - 52 % MONOCYTES 6 3 - 10 % EOSINOPHILS 0 0 - 5 % BASOPHILS 0 0 - 2 %  
 ABS. NEUTROPHILS 7.4 1.8 - 8.0 K/UL  
 ABS. LYMPHOCYTES 2.1 0.9 - 3.6 K/UL  
 ABS. MONOCYTES 0.7 0.05 - 1.2 K/UL  
 ABS. EOSINOPHILS 0.0 0.0 - 0.4 K/UL  
 ABS. BASOPHILS 0.0 0.0 - 0.1 K/UL  
 DF AUTOMATED    
GLUCOSE, POC Collection Time: 05/23/20  7:35 AM  
Result Value Ref Range Glucose (POC) 85 70 - 110 mg/dL PTT Collection Time: 05/23/20  9:35 AM  
Result Value Ref Range aPTT 103.8 (H) 23.0 - 36.4 SEC Sonianda Speaker Collection Time: 05/23/20  9:35 AM  
Result Value Ref Range Vancomycin,trough 34.6 (HH) 10.0 - 20.0 ug/mL Reported dose date: [id number] Reported dose time: 2100 Reported dose: 1000 MG UNITS  
GLUCOSE, POC  Collection Time: 05/23/20 10:58 AM  
 Result Value Ref Range Glucose (POC) 203 (H) 70 - 110 mg/dL PTT Collection Time: 05/23/20  4:00 PM  
Result Value Ref Range aPTT 50.5 (H) 23.0 - 36.4 SEC GLUCOSE, POC Collection Time: 05/23/20  4:01 PM  
Result Value Ref Range Glucose (POC) 210 (H) 70 - 110 mg/dL Signed By: Jayleen Hernandez MD   
 May 23, 2020 Dragon medical dictation software was used for portions of this report. Unintended errors may occur.

## 2020-05-23 NOTE — ROUTINE PROCESS
Verbal bedside shift report given to Joey Begum RN on coming nurse by Juliann Watts RN off going nurse including Aretha Amos, [facility] and [facility]

## 2020-05-24 NOTE — ROUTINE PROCESS
Verbal bedside shift report given to Karl Solis, RN on coming nurse by Gerson Faulkner RN off going nurse including Aretha Amos, [facility] and [facility]

## 2020-05-24 NOTE — PROGRESS NOTES
[facility] Hospitalist Group Progress Note Patient: Jose Astudillo Age: 62 y.o. : 1955 MR#: [id number] SSN: [id number] Date/Time: 2020 Subjective:   
Patient is sitting in bed in no apparent distress, denies chest pain or shortness of breath Assessment/Plan: 1. Pneumonia,on antibiotics, follow cultures 2. NSTEMI: Continue IV heparin, plans per cardiology 3. Acute on chronic diastolic HF: Lasix as per cardiology, monitor renal function 4. Hypokalemia: Monitor 5. chronic respiratory failure with hypoxia: Continue home oxygen 6. COPD, without exacerbationcontinue home oxygen and bronchodilators 7. Pulmonary HTN, moderate to severe: Continue O2 supplement 8. Sarcoidosis with chronic steroid: On chronic steroids 9. Polysubstance abuse: Counseled cessation of illicit drug use 10. Paroxysmal AFIB, in sinus: Eliquis on hold, on heparin drip 11. Active tobacco smoking: Smoking cessation education 12. Hypothyroidism: Continue Synthroid 13. Hyperglycemia with DM2: Sliding scale insulin 14. Sickle cell trait with anemia, iron deficiency anemia : H&H stable 15. Midline abd wound, no infection: wound care following 16. T6 compression fracture: noted on  CTA 17. COVID-19 test results are pending Kyra with patient Full code PT OT Case discussed with:  [x]Patient  []Family  [x]Nursing  []Case Management DVT Prophylaxis:  []Lovenox  [x]Hep IV[]SCDs  []Coumadin   []Eliquis/Xarelto Objective:  
VS:  
Visit Vitals /68 (BP 1 Location: Right arm, BP Patient Position: At rest) Pulse 72 Temp 98.6 °F (37 °C) Resp 20 Ht 5' 2\" (1.575 m) Wt 73.9 kg (163 lb) LMP 2012 (Exact Date) SpO2 98% Breastfeeding No  
BMI 29.81 kg/m² Tmax/24hrs: Temp (24hrs), Av.3 °F (36.8 °C), Min:97.3 °F (36.3 °C), Max:98.6 °F (37 °C) IOBRIEF Intake/Output Summary (Last 24 hours) at 2020 1710 Last data filed at 2020 1534 Gross per 24 hour Intake 600 ml Output  Net 600 ml General:  Awake, alert Cardiovascular:  S1S2+, RRR Pulmonary: Coarse breath sounds bilaterally GI:  Soft, BS+, NT, ND Extremities:  No edema Medications:  
Current Facility-Administered Medications Medication Dose Route Frequency  potassium bicarb-citric acid (EFFER-K) tablet 20 mEq  20 mEq Oral DAILY  furosemide (LASIX) tablet 20 mg  20 mg Oral DAILY  pantoprazole (PROTONIX) tablet 40 mg  40 mg Oral ACB&D  
 methadone (DOLOPHINE) 10 mg/mL concentrated solution 10 mg  10 mg Oral DAILY  cefepime (MAXIPIME) 2 g in sterile water (preservative free) 10 mL IV syringe  2 g IntraVENous Q8H  
 heparin 25,000 units in D5W 250 ml infusion  12-25 Units/kg/hr IntraVENous TITRATE  sodium chloride (NS) flush 5-40 mL  5-40 mL IntraVENous Q8H  
 sodium chloride (NS) flush 5-40 mL  5-40 mL IntraVENous PRN  
 acetaminophen (TYLENOL) tablet 650 mg  650 mg Oral Q4H PRN  
 naloxone (NARCAN) injection 0.4 mg  0.4 mg IntraVENous PRN  
 diphenhydrAMINE (BENADRYL) injection 12.5 mg  12.5 mg IntraVENous Q4H PRN  
 ondansetron (ZOFRAN) injection 4 mg  4 mg IntraVENous Q4H PRN  
 bisacodyL (DULCOLAX) suppository 10 mg  10 mg Rectal DAILY PRN  
 insulin glargine (LANTUS) injection 15 Units  0.2 Units/kg SubCUTAneous QHS  insulin lispro (HUMALOG) injection   SubCUTAneous AC&HS  
 glucose chewable tablet 16 g  4 Tab Oral PRN  
 glucagon (GLUCAGEN) injection 1 mg  1 mg IntraMUSCular PRN  
 inhalational spacing device 1 Each  1 Each Does Not Apply PRN  
 amiodarone (CORDARONE) tablet 200 mg  200 mg Oral DAILY  ascorbic acid (vitamin C) (VITAMIN C) tablet 250 mg  250 mg Oral DAILY WITH BREAKFAST  aspirin chewable tablet 81 mg  81 mg Oral DAILY WITH BREAKFAST  roflumilast (DALIRESP) tablet 250 mcg  250 mcg Oral DAILY  sertraline (ZOLOFT) tablet 50 mg  50 mg Oral DAILY  rosuvastatin (CRESTOR) tablet 5 mg  5 mg Oral QHS  glycopyrrolate-formoterol (BEVESPI AEROSPHERE) 9 mcg-4.8 mcg inhaler  2 Puff Inhalation BID RT  
 albuterol (PROVENTIL HFA, VENTOLIN HFA, PROAIR HFA) inhaler 1 Puff  1 Puff Inhalation Q4H RT  
 lactobacillus sp. 50 billion cpu (BIO-K PLUS) capsule 1 Cap  1 Cap Oral DAILY  zinc oxide-vitamin b5-vit e (BALMEX) cream   Topical DAILY PRN  
 guaiFENesin-dextromethorphan (ROBITUSSIN DM) 100-10 mg/5 mL syrup 10 mL  10 mL Oral Q6H PRN  
 levothyroxine (SYNTHROID) tablet 100 mcg  100 mcg Oral 6am  
 vancomycin (VANCOCIN) 1,000 mg in 0.9% sodium chloride (MBP/ADV) 250 mL adv  1,000 mg IntraVENous Q12H  
 dextrose 10% infusion 125-250 mL  125-250 mL IntraVENous PRN Labs:   
Recent Results (from the past 24 hour(s)) GLUCOSE, POC Collection Time: 05/23/20  8:40 PM  
Result Value Ref Range Glucose (POC) 343 (H) 70 - 110 mg/dL Algie Cheese Collection Time: 05/23/20  9:05 PM  
Result Value Ref Range Vancomycin,trough 18.7 10.0 - 20.0 ug/mL Reported dose date: [id number] Reported dose time: 0900 Reported dose: 1000MG UNITS FIBRINOGEN Collection Time: 05/24/20 12:38 AM  
Result Value Ref Range Fibrinogen 379 210 - 451 mg/dL PROTHROMBIN TIME + INR Collection Time: 05/24/20 12:38 AM  
Result Value Ref Range Prothrombin time 14.4 11.5 - 15.2 sec INR 1.1 0.8 - 1.2 PTT Collection Time: 05/24/20 12:38 AM  
Result Value Ref Range aPTT 91.2 (H) 23.0 - 36.4 SEC  
LD Collection Time: 05/24/20 12:38 AM  
Result Value Ref Range  (H) 81 - 234 U/L  
D DIMER Collection Time: 05/24/20 12:38 AM  
Result Value Ref Range D DIMER 0.57 (H) <0.46 ug/ml(FEU) FERRITIN Collection Time: 05/24/20 12:38 AM  
Result Value Ref Range Ferritin 81 8 - 388 NG/ML  
C REACTIVE PROTEIN, QT Collection Time: 05/24/20 12:38 AM  
Result Value Ref Range C-Reactive protein 1.2 (H) 0 - 0.3 mg/dL PHOSPHORUS  Collection Time: 05/24/20 12:38 AM  
 Result Value Ref Range Phosphorus 3.1 2.5 - 4.9 MG/DL  
METABOLIC PANEL, BASIC Collection Time: 05/24/20 12:38 AM  
Result Value Ref Range Sodium 142 136 - 145 mmol/L Potassium 4.1 3.5 - 5.5 mmol/L Chloride 99 (L) 100 - 111 mmol/L  
 CO2 39 (H) 21 - 32 mmol/L Anion gap 4 3.0 - 18 mmol/L Glucose 279 (H) 74 - 99 mg/dL BUN 12 7.0 - 18 MG/DL Creatinine 0.72 0.6 - 1.3 MG/DL  
 BUN/Creatinine ratio 17 12 - 20 GFR est AA >60 >60 ml/min/1.73m2 GFR est non-AA >60 >60 ml/min/1.73m2 Calcium 7.1 (L) 8.5 - 10.1 MG/DL  
CBC WITH AUTOMATED DIFF Collection Time: 05/24/20 12:38 AM  
Result Value Ref Range WBC 8.8 4.6 - 13.2 K/uL  
 RBC 3.42 (L) 4.20 - 5.30 M/uL HGB 8.6 (L) 12.0 - 16.0 g/dL HCT 28.1 (L) 35.0 - 45.0 % MCV 82.2 74.0 - 97.0 FL  
 MCH 25.1 24.0 - 34.0 PG  
 MCHC 30.6 (L) 31.0 - 37.0 g/dL  
 RDW 17.3 (H) 11.6 - 14.5 % PLATELET 868 440 - 968 K/uL MPV 9.7 9.2 - 11.8 FL  
 NEUTROPHILS 76 (H) 40 - 73 % LYMPHOCYTES 18 (L) 21 - 52 % MONOCYTES 6 3 - 10 % EOSINOPHILS 0 0 - 5 % BASOPHILS 0 0 - 2 %  
 ABS. NEUTROPHILS 6.7 1.8 - 8.0 K/UL  
 ABS. LYMPHOCYTES 1.6 0.9 - 3.6 K/UL  
 ABS. MONOCYTES 0.5 0.05 - 1.2 K/UL  
 ABS. EOSINOPHILS 0.0 0.0 - 0.4 K/UL  
 ABS. BASOPHILS 0.0 0.0 - 0.1 K/UL  
 DF AUTOMATED MAGNESIUM Collection Time: 05/24/20 12:38 AM  
Result Value Ref Range Magnesium 1.4 (L) 1.6 - 2.6 mg/dL GLUCOSE, POC Collection Time: 05/24/20  7:34 AM  
Result Value Ref Range Glucose (POC) 91 70 - 110 mg/dL GLUCOSE, POC Collection Time: 05/24/20 10:58 AM  
Result Value Ref Range Glucose (POC) 150 (H) 70 - 110 mg/dL ECHO ADULT FOLLOW-UP OR LIMITED Collection Time: 05/24/20 12:49 PM  
Result Value Ref Range LA Volume 96.33 22 - 52 mL Ao Root D 2.85 cm LVIDd 4.11 3.9 - 5.3 cm  
 LVPWd 1.20 (A) 0.6 - 0.9 cm LVIDs 2.89 cm  IVSd 1.24 (A) 0.6 - 0.9 cm  
 LV ED Vol A2C 97.5 mL  
 LV ES Vol A4C 49.5 mL  
 LV ES Vol BP 41.2 19 - 49 mL  
 LVOT d 1.82 cm  
 BP EF 54.7 (A) 55 - 100 % LV Ejection Fraction MOD 4C 40 % LV Ejection Fraction MOD 2C 71 % LA Vol 4C 76.10 (A) 22 - 52 mL  
 LA Vol 2C 94.68 (A) 22 - 52 mL  
 LA Area 4C 23.1 cm2 LV Mass .0 (A) 67 - 162 g  
 LV Mass AL Index 118.1 (A) 43 - 95 g/m2 LV ES Vol A2C 28.0 mL  
 LVES Vol Index BP 23.5 mL/m2 LV ED Vol A4C 82.9 mL  
 LVED Vol Index BP 51.9 mL/m2 Left Atrium Major Axis 3.76 cm Triscuspid Valve Regurgitation Peak Gradient 45.1 mmHg LV ED Vol BP 90.9 56 - 104 ml  
 TR Max Velocity 335.93 cm/s  
 LA Vol Index 54.97 16 - 28 ml/m2 LA Vol Index 54.03 16 - 28 ml/m2 LA Vol Index 43.43 16 - 28 ml/m2 LVED Vol Index A4C 47.3 mL/m2 LVED Vol Index A2C 55.6 mL/m2 LVES Vol Index A4C 28.2 mL/m2 LVES Vol Index A2C 16.0 mL/m2 Left Ventricular Fractional Shortening by 2D 83.568666481 % Left Ventricular End Diastolic Volume by Teichholz Method 1.78557129207 mL Left Ventricular End Systolic Volume by Teichholz Method 5.95382817031 mL Left Ventricular Stroke Volume by 2-D Biplane-MOD 80.81621482526 mL Left Ventricular Stroke Volume by 2-D Single Plane- MOD 04.149760072 mL Left Ventricular Stroke Volume by Teichholz Method 98.079949770 mL Left Ventricular Stroke Volume by 2-D Single Plane- MOD 76.72306936 mL  
GLUCOSE, POC Collection Time: 05/24/20  2:48 PM  
Result Value Ref Range Glucose (POC) 341 (H) 70 - 110 mg/dL Signed By: Evin Pantoja MD   
 May 24, 2020 Dragon medical dictation software was used for portions of this report. Unintended errors may occur.

## 2020-05-25 NOTE — PROGRESS NOTES
Problem: Mobility Impaired (Adult and Pediatric) Goal: *Acute Goals and Plan of Care (Insert Text) Description: Physical Therapy Goals Initiated 5/25/2020 and to be accomplished within 4 day(s) 1. Patient will perform rolling in bed with moderate assistance. 2.  Patient will move from supine to sit and sit to supine  in bed with moderate assistance . 3. Patient will tolerate sitting EOB for 8 minutes with fair balance. 4.  Patient will transfer from bed to chair and chair to bed with moderate assistance  using the least restrictive device. 5.  Patient will perform sit to stand with moderate assistance. 6.  Patient will tolerate static supported standing for 1 minute with fair balance. PLOF: Patient reports she was receiving home PT working on supported standing for 1 minute. She has care aid and LPN who assists with self care and functional mobility. Outcome: Progressing Towards Goal 
  
PHYSICAL THERAPY EVALUATION Patient: Diony Nunez (65 y.o. female) Date: 5/25/2020 Primary Diagnosis: HCAP (healthcare-associated pneumonia) [J18.9] Suspected COVID-19 virus infection [Z20.828] Precautions:   Fall, Skin, Aspiration ASSESSMENT : 
Patient agreeable to skilled PT evaluation. Patient seen in conjunction with OT to maximize pt and staff safety with mobility. 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 Patient requires max A x2 to scoot up towards the [facility], she uses UE's to pull on rail. Patients lunch arrives and declines further participation in functional mobility. Based on the objective data described below, the patient presents with decreased LE strength, impaired balance, decreased endurance, and decreased independence with functional mobility. Patient will benefit from skilled PT to maximize mobility and increase independence with ADL's. Patient will benefit from skilled intervention to address the above impairments. Patient's rehabilitation potential is considered to be Fair to good Factors which may influence rehabilitation potential include:  
[]         None noted 
[]         Mental ability/status [x]         Medical condition 
[]         Home/family situation and support systems 
[]         Safety awareness 
[]         Pain tolerance/management 
[]         Other: PLAN : 
Recommendations and Planned Interventions:  
[x]           Bed Mobility Training             [x]    Neuromuscular Re-Education 
[x]           Transfer Training                   []    Orthotic/Prosthetic Training 
[x]           Gait Training                          []    Modalities [x]           Therapeutic Exercises           []    Edema Management/Control 
[x]           Therapeutic Activities            []    Family Training/Education 
[x]           Patient Education 
[]           Other (comment): Frequency/Duration: Patient will be followed by physical therapy 1-2 times per day/4-7 days per week to address goals. Discharge Recommendations: home health with 24h assistance Further Equipment Recommendations for Discharge: Patient has hospital bed, WC, bedside commode SUBJECTIVE:  
Patient stated I am paralyzed from the knees down.  OBJECTIVE DATA SUMMARY:  
 
Past Medical History:  
Diagnosis Date Abnormally low high density lipoprotein (HDL) cholesterol with hypertriglyceridemia Lipid profile (11/6/2016) showed , , HDL 38, LDL 37 Acute blood loss as cause of postoperative anemia 12/12/2019 Acute on chronic diastolic (congestive) heart failure ([facility] [address] 5/21/2020 Anxiety Bipolar affective disorder ([facility] [address] 12/5/2012 Cellulitis of right forearm 05/04/2017 Chronic back pain Chronic obstructive pulmonary disease (COPD) ([facility] [address] SOB, on [place] O2 Recent admission with psychosis Chronic respiratory failure with hypoxia (HCC) On home oxygen inhalation at 3 LPM via NC Contusion of left elbow 2017 Depression Diabetic neuropathy associated with type 2 diabetes mellitus ([address] Diastolic dysfunction without heart failure Stable on diuretics Falls frequently Gastroesophageal reflux disease with hiatal hernia Generalized osteoarthritis of multiple sites Gout On Allopurinol History of acute renal failure 2013 History of back injury   
 jumped out of second story window History of fracture due to fall 2019 History of hepatitis C   
 treated History of penicillin allergy History of vitamin D deficiency 5/10/2017 Hypertensive heart disease without heart failure Better controlled Hyperuricemia 2017 Hypothyroidism Hypoxemia requiring supplemental oxygen 2014 Intravenous drug user 2017 Long term current use of aspirin Memory difficulty Menopause Mixed connective tissue disease ([address] 5/10/2017 Obesity, Class I, BMI 30-34.9 Olecranon bursitis of right elbow 2017 Opioid dependence ([address] On Methadone Recurrent genital herpes 2013 Right Achilles tendinitis 2017 Sarcoidosis Sickle cell trait ([address] Tobacco use disorder Type 2 diabetes mellitus with diabetic neuropathy, with long-term current use of insulin ([address] HbA1c (2019) = 7.1 Urge urinary incontinence 5/10/2017 Venous insufficiency Wears glasses Past Surgical History:  
Procedure Laterality Date [address] HX  SECTION    
 HX COLOSTOMY  2020 Colectomy with transverse colostomy HX CYST REMOVAL Left 1979 Left foot HX OTHER SURGICAL Left 2017 S/P incision and drainage of the abscess of the left hand and the left foot (2017 - Dr. Missy Ruano [facility]) HX THORACIC LAMINECTOMY  2019 S/P T10, T9, T8 laminectomy; T7, T8, T9, T10, T11, T12 posterior thoracic fusion; segmental spinal instrumentation; K2M [place] type, T7-T8, T11-T12 (12/11/2019 - Dr. Vito Carcamo) HX TUBAL LIGATION Barriers to Learning/Limitations: None Compensate with: Visual Cues, Verbal Cues, and Tactile Cues Home Situation: 
Home Situation Home Environment: Private residence # Steps to Enter: 4 One/Two Story Residence: One story Living Alone: No 
Support Systems: Family member(s), Child(chio) Patient Expects to be Discharged to[de-identified] Private residence Current DME Used/Available at Home: Wheelchair Critical Behavior: 
Neurologic State: Alert Orientation Level: Oriented X4 Cognition: Follows commands Safety/Judgement: Fall prevention Psychosocial 
Patient Behaviors: Calm; Cooperative Purposeful Interaction: Yes 
Caring Interventions: Reassure Skin Condition/Temp: Dry;Warm 
  
Skin Integrity: Excoriation Skin Integumentary Skin Color: Appropriate for ethnicity Skin Condition/Temp: Dry;Warm 
Skin Integrity: Excoriation Turgor: Non-tenting Strength:   
Strength: Generally decreased, functional 
 
Tone & Sensation:  
Tone: Normal 
Sensation: Intact Range Of Motion: 
AROM: Generally decreased, functional 
 
Functional Mobility: 
Bed Mobility: 
Scooting: Maximum assistance;Assist x2 Pain: 
Pain level pre-treatment: 0/10 Pain level post-treatment: 0/10 Activity Tolerance:  
Fair Please refer to the flowsheet for vital signs taken during this treatment. After treatment:  
[]         Patient left in no apparent distress sitting up in chair 
[x]         Patient left in no apparent distress in bed 
[x]         Call bell left within reach [x]         Nursing notified 
[]         Caregiver present 
[]         Bed alarm activated 
[]         SCDs applied COMMUNICATION/EDUCATION:  
[x]         Role of Physical Therapy in the acute care setting. [x]         Fall prevention education was provided and the patient/caregiver indicated understanding. [x]         Patient/family have participated as able in goal setting and plan of care. [x]         Patient/family agree to work toward stated goals and plan of care. []         Patient understands intent and goals of therapy, but is neutral about his/her participation. []         Patient is unable to participate in goal setting/plan of care: ongoing with therapy staff. 
[]         Other: Thank you for this referral. 
Lloyd Caldwell, PT Time Calculation: 23 mins Eval Complexity: History: MEDIUM  Complexity : 1-2 comorbidities / personal factors will impact the outcome/ POC Exam:MEDIUM Complexity : 3 Standardized tests and measures addressing body structure, function, activity limitation and / or participation in recreation  Presentation: MEDIUM Complexity : Evolving with changing characteristics  Clinical Decision Making:Medium Complexity    Overall Complexity:MEDIUM

## 2020-05-25 NOTE — ROUTINE PROCESS
Patient is alert, IV heparin infusing as ordered. O2 via NC in place. Colostomy bag and wafer changed. Call bell in reach.

## 2020-05-25 NOTE — PROGRESS NOTES
Problem: Self Care Deficits Care Plan (Adult) Goal: *Acute Goals and Plan of Care (Insert Text) Description: Occupational Therapy Goals Initiated 5/25/2020 within 7 day(s). 1.  Patient will perform bed mobility with minimal assistance/contact guard assist in preparation for further self-care. 2.  Patient will perform upper body dressing with supervision/set-up. 3.  Patient will perform a functional activity sitting EOB with F sitting balance for 3-5 minutes. 4.  Patient will perform toilet transfers with minimal assistance/contact guard assist. 
5.  Patient will perform all aspects of toileting with moderate assistance . 6. Patient will participate in upper extremity therapeutic exercise/activities with minimal assistance/contact guard assist for 8 minutes. Prior Level of Function: Pt reports she was (I) with self-feeding, grooming, and UB ADLs, but required assist for LB ADLs PTA. Pt has a BSC but did not specify if or how much assistance she needed for functional transfers. Pt has a hospital bed. Pt has a PCA (8am-1pm, 5pm-10pm) and a friend who is an LPN. Pt was working on standing at sink level activities x1 min with home health PT. Pt did not report if she was receiving [facility] OT. Pt reports she has plans on moving to a handicap accessible home with her LPN friend. Outcome: Progressing Towards Goal 
 OCCUPATIONAL THERAPY EVALUATION Patient: Haider Omalley (65 y.o. female) Date: 5/25/2020 Primary Diagnosis: HCAP (healthcare-associated pneumonia) [J18.9] Suspected COVID-19 virus infection [Z20.828] Precautions: Falls; Aspiration ASSESSMENT : 
Pt cleared to participate in OT evaluation by RN. Upon entering room, pt received semi-reclined in bed, alert, and agreeable to OT eval. Pt seen in conjunction with PT to maximize safety of patient and staff members.  Based on the objective data described below, the patient presents with decreased strength and decreased independence in ADLs, increasing the need for assistance from others. Pt able to provide lengthy PLOF. BUE objective assessment was done at bed level with pt demo BUE AROM/strength generally decreased but functional. Pt's lunch came during session, with pt declining further ADL/functional mobility. Pt educated on repositioning in bed for optimal swallowing while performing self-feeding tasks as pt was initially seen low in bed. Pt required max assist x2 for scooting towards HOB to be in neutral alignment. Based on clinical judgement, pt requires min-mod assist for UB ADLs, and max assist for LB ADLs and toileting. Educated pt on the role of Ot, evaluation process, and goals for therapy with pt demo fair understanding. The patient requires  skilled OT services to assess safety and increase performance with basic self-care/ADLs, enhancing the patients quality of life by improving their ability to return to PLOF. At the end of the session, pt left resting comfortably in bed, call bell in reach, with all needs met. Patient will benefit from skilled intervention to address the above impairments. Patient's rehabilitation potential is considered to be Good Factors which may influence rehabilitation potential include:  
[]             None noted []             Mental ability/status [x]             Medical condition []             Home/family situation and support systems []             Safety awareness []             Pain tolerance/management 
[]             Other: PLAN : 
Recommendations and Planned Interventions:  
[x]               Self Care Training                  [x]      Therapeutic Activities [x]               Functional Mobility Training   []      Cognitive Retraining 
[x]               Therapeutic Exercises           [x]      Endurance Activities [x]               Balance Training                    [x]      Neuromuscular Re-Education []               Visual/Perceptual Training     [x]      Home Safety Training 
[x]               Patient Education                   [x]      Family Training/Education []               Other (comment): Frequency/Duration: Patient will be followed by occupational therapy 1-2 times per day/4-7 days per week to address goals. Discharge Recommendations: Home Health with 24/7 Supervision/Assist 
Further Equipment Recommendations for Discharge: N/A; Pt reports she has a BSC and w/c at home SUBJECTIVE:  
Patient stated i'm paralyzed from the knees down OBJECTIVE DATA SUMMARY:  
 
Past Medical History:  
Diagnosis Date  Abnormally low high density lipoprotein (HDL) cholesterol with hypertriglyceridemia Lipid profile (11/6/2016) showed , , HDL 38, LDL 37  Acute blood loss as cause of postoperative anemia 12/12/2019  Acute on chronic diastolic (congestive) heart failure ([address] 5/21/2020  Anxiety  Bipolar affective disorder ([address] 12/5/2012  Cellulitis of right forearm 05/04/2017  Chronic back pain  Chronic obstructive pulmonary disease (COPD) ([address] SOB, on [place] O2 Recent admission with psychosis  Chronic respiratory failure with hypoxia ([address] On home oxygen inhalation at 3 LPM via NC  
 Contusion of left elbow 5/4/2017  Depression  Diabetic neuropathy associated with type 2 diabetes mellitus ([address]  Diastolic dysfunction without heart failure Stable on diuretics  Falls frequently  Gastroesophageal reflux disease with hiatal hernia  Generalized osteoarthritis of multiple sites  Gout On Allopurinol  History of acute renal failure 5/31/2013  History of back injury   
 jumped out of second story window  History of fracture due to fall 12/11/2019  
 History of hepatitis C   
 treated  History of penicillin allergy  History of vitamin D deficiency 5/10/2017  Hypertensive heart disease without heart failure Better controlled  Hyperuricemia 2017  Hypothyroidism  Hypoxemia requiring supplemental oxygen 2014  Intravenous drug user 2017  Long term current use of aspirin  Memory difficulty  Menopause  Mixed connective tissue disease ([facility] 5/10/2017  Obesity, Class I, BMI 30-34.9  Olecranon bursitis of right elbow 2017  Opioid dependence ([facility] On Methadone  Recurrent genital herpes 2013  Right Achilles tendinitis 2017  Sarcoidosis  Sickle cell trait ([facility]  Tobacco use disorder  Type 2 diabetes mellitus with diabetic neuropathy, with long-term current use of insulin ([facility] HbA1c (2019) = 7.1  Urge urinary incontinence 5/10/2017  Venous insufficiency  Wears glasses Past Surgical History:  
Procedure Laterality Date Toño  HX  SECTION    
 HX COLOSTOMY  2020 Colectomy with transverse colostomy  HX CYST REMOVAL Left 1979 Left foot  HX OTHER SURGICAL Left 2017 S/P incision and drainage of the abscess of the left hand and the left foot (2017 -  [facility])  HX THORACIC LAMINECTOMY  2019 S/P T10, T9, T8 laminectomy; T7, T8, T9, T10, T11, T12 posterior thoracic fusion; segmental spinal instrumentation; K2M [place] type, T7-T8, T11-T12 (2019 - Dr. Radha Stone)  HX TUBAL LIGATION Barriers to Learning/Limitations: yes;  emotional and physical 
Compensate with: visual, verbal, tactile, kinesthetic cues/model Home Situation:  
Home Situation Home Environment: Private residence # Steps to Enter: 4 One/Two Story Residence: One story Living Alone: No 
Support Systems: Family member(s), Child(chio) Patient Expects to be Discharged to[de-identified] Private residence Current DME Used/Available at Home: Wheelchair []  Right hand dominant   []  Left hand dominant Cognitive/Behavioral Status: 
Neurologic State: Alert Orientation Level: Oriented X4 Cognition: Follows commands Safety/Judgement: Fall prevention Skin: Visible skin appeared intact. Edema: None noted Coordination: BUE Coordination: Generally decreased, functional 
Fine Motor Skills-Upper: Left Intact; Right Intact Gross Motor Skills-Upper: Left Intact; Right Intact Balance: 
NT Strength: BUE Strength: Generally decreased, functional 
 
Tone & Sensation: BUE Tone: Normal 
Sensation: Intact Range of Motion: BUE 
AROM: Generally decreased, functional 
 
 
Functional Mobility and Transfers for ADLs: 
Bed Mobility: 
Scooting: Maximum assistance;Assist x2 Transfers: 
Pt declined. ADL Assessment:  
Feeding: Modified independent Oral Facial Hygiene/Grooming: Modified Independent Bathing: Moderate assistance Upper Body Dressing: Minimum assistance Lower Body Dressing: Maximum assistance Toileting: Maximum assistance ADL Intervention: 
Feeding Feeding Assistance: Modified independent; Set-up Container Management: Modified independent; Set-up Food to Mouth: Modified independent; Set-up Cognitive Retraining Safety/Judgement: Fall prevention Pain: 
Pain level pre-treatment: 0/10 Pain level post-treatment: 0/10 Pain Intervention(s): Medication (see MAR); Rest, Ice, Repositioning Response to intervention: Nurse notified, See doc flow Activity Tolerance:  
Fair Please refer to the flowsheet for vital signs taken during this treatment. After treatment:  
[] Patient left in no apparent distress sitting up in chair 
[x] Patient left in no apparent distress in bed 
[x] Call bell left within reach [x] Nursing notified 
[] Caregiver present 
[] Bed alarm activated COMMUNICATION/EDUCATION:  
[x] Role of Occupational Therapy in the acute care setting 
[] Home safety education was provided and the patient/caregiver indicated understanding. [x] Patient/family have participated as able in goal setting and plan of care. [x] Patient/family agree to work toward stated goals and plan of care. [] Patient understands intent and goals of therapy, but is neutral about his/her participation. [] Patient is unable to participate in goal setting and plan of care. Thank you for this referral. 
Reynold Reyes MS, OTR/L Time Calculation: 23 mins Eval Complexity: History: MEDIUM Complexity : Expanded review of history including physical, cognitive and psychosocial  history ; Examination: MEDIUM Complexity : 3-5 performance deficits relating to physical, cognitive , or psychosocial skils that result in activity limitations and / or participation restrictions; Decision Making:MEDIUM Complexity : Patient may present with comorbidities that affect occupational performnce. Miniml to moderate modification of tasks or assistance (eg, physical or verbal ) with assesment(s) is necessary to enable patient to complete evaluation

## 2020-05-25 NOTE — ROUTINE PROCESS
Verbal bedside shift report given to Yassine Mir RN oncoming nurse by Parminder Sands RN off going nurse including KARDEX, SBAR and [facility]

## 2020-05-25 NOTE — PROGRESS NOTES
[facility] Hospitalist Group Progress Note Patient: Luisa Dandy Age: 62 y.o. : 1955 MR#: [id number] SSN: [id number] Date/Time: 2020 Subjective:   
Patient sitting in bed in no apparent distress, awake and alert, feels better, denies chest pain or shortness of breath Assessment/Plan: 1. Pneumonia, continue antibiotics 2. NSTEMI: On IV heparin, await cardiology plans 3. Acute on chronic diastolic HF: Lasix as per cardiology, monitor renal function 4. Hypokalemia: Monitor 5. chronic respiratory failure with hypoxia: Continue home oxygen 6. COPD, without exacerbationcontinue home oxygen and bronchodilators 7. Pulmonary HTN, moderate to severe: Continue O2 supplement 8. Sarcoidosis with chronic steroid: On chronic steroids 9. Polysubstance abuse: Counseled cessation of illicit drugs 10. Paroxysmal AFIB, in sinus: Eliquis on hold, on heparin drip, on amiodarone 11. Active tobacco smoking: Counseled smoking cessation 12. Hypothyroidism: On Synthroid 13. Hyperglycemia with DM2: Continue Lantus, sliding scale insulin 14. Sickle cell trait with anemia, iron deficiency anemia : H&H stable 15. Midline abd wound, no infection: wound care following monitor hemoglobin T6 compression fracture: noted on  CTA 16. COVID-19 test results are still pending Discussed with patient Full code Case discussed with:  [x]Patient  []Family  [x]Nursing  []Case Management DVT Prophylaxis:  []Lovenox  [x]Hep IV[]SCDs  []Coumadin   []Eliquis/Xarelto Objective:  
VS:  
Visit Vitals /63 (BP 1 Location: Right arm, BP Patient Position: At rest) Pulse 67 Temp 97.7 °F (36.5 °C) Resp 20 Ht 5' 2\" (1.575 m) Wt 73.9 kg (163 lb) LMP 2012 (Exact Date) SpO2 97% Breastfeeding No  
BMI 29.81 kg/m² Tmax/24hrs: Temp (24hrs), Av.4 °F (36.9 °C), Min:97.7 °F (36.5 °C), Max:99.1 °F (37.3 °C) IOBRIEF 
 
 Intake/Output Summary (Last 24 hours) at 5/25/2020 1820 Last data filed at 5/25/2020 1002 Gross per 24 hour Intake 720 ml Output 1000 ml Net -280 ml General:  Awake, alert Cardiovascular:  S1S2+, RRR Pulmonary: Coarse breath sounds bilaterally GI:  Soft, BS+, NT, ND Extremities:  No edema Medications:  
Current Facility-Administered Medications Medication Dose Route Frequency  potassium bicarb-citric acid (EFFER-K) tablet 20 mEq  20 mEq Oral DAILY  furosemide (LASIX) tablet 20 mg  20 mg Oral DAILY  pantoprazole (PROTONIX) tablet 40 mg  40 mg Oral ACB&D  
 methadone (DOLOPHINE) 10 mg/mL concentrated solution 10 mg  10 mg Oral DAILY  cefepime (MAXIPIME) 2 g in sterile water (preservative free) 10 mL IV syringe  2 g IntraVENous Q8H  
 heparin 25,000 units in D5W 250 ml infusion  12-25 Units/kg/hr IntraVENous TITRATE  sodium chloride (NS) flush 5-40 mL  5-40 mL IntraVENous Q8H  
 sodium chloride (NS) flush 5-40 mL  5-40 mL IntraVENous PRN  
 acetaminophen (TYLENOL) tablet 650 mg  650 mg Oral Q4H PRN  
 naloxone (NARCAN) injection 0.4 mg  0.4 mg IntraVENous PRN  
 diphenhydrAMINE (BENADRYL) injection 12.5 mg  12.5 mg IntraVENous Q4H PRN  
 ondansetron (ZOFRAN) injection 4 mg  4 mg IntraVENous Q4H PRN  
 bisacodyL (DULCOLAX) suppository 10 mg  10 mg Rectal DAILY PRN  
 insulin glargine (LANTUS) injection 15 Units  0.2 Units/kg SubCUTAneous QHS  insulin lispro (HUMALOG) injection   SubCUTAneous AC&HS  
 glucose chewable tablet 16 g  4 Tab Oral PRN  
 glucagon (GLUCAGEN) injection 1 mg  1 mg IntraMUSCular PRN  
 inhalational spacing device 1 Each  1 Each Does Not Apply PRN  
 amiodarone (CORDARONE) tablet 200 mg  200 mg Oral DAILY  ascorbic acid (vitamin C) (VITAMIN C) tablet 250 mg  250 mg Oral DAILY WITH BREAKFAST  aspirin chewable tablet 81 mg  81 mg Oral DAILY WITH BREAKFAST  roflumilast (DALIRESP) tablet 250 mcg  250 mcg Oral DAILY  sertraline (ZOLOFT) tablet 50 mg  50 mg Oral DAILY  rosuvastatin (CRESTOR) tablet 5 mg  5 mg Oral QHS  glycopyrrolate-formoterol (BEVESPI AEROSPHERE) 9 mcg-4.8 mcg inhaler  2 Puff Inhalation BID RT  
 albuterol (PROVENTIL HFA, VENTOLIN HFA, PROAIR HFA) inhaler 1 Puff  1 Puff Inhalation Q4H RT  
 lactobacillus sp. 50 billion cpu (BIO-K PLUS) capsule 1 Cap  1 Cap Oral DAILY  zinc oxide-vitamin b5-vit e (BALMEX) cream   Topical DAILY PRN  
 guaiFENesin-dextromethorphan (ROBITUSSIN DM) 100-10 mg/5 mL syrup 10 mL  10 mL Oral Q6H PRN  
 levothyroxine (SYNTHROID) tablet 100 mcg  100 mcg Oral 6am  
 vancomycin (VANCOCIN) 1,000 mg in 0.9% sodium chloride (MBP/ADV) 250 mL adv  1,000 mg IntraVENous Q12H  
 dextrose 10% infusion 125-250 mL  125-250 mL IntraVENous PRN Labs:   
Recent Results (from the past 24 hour(s)) GLUCOSE, POC Collection Time: 05/24/20 11:30 PM  
Result Value Ref Range Glucose (POC) 231 (H) 70 - 110 mg/dL FIBRINOGEN Collection Time: 05/25/20  5:09 AM  
Result Value Ref Range Fibrinogen 411 210 - 451 mg/dL PROTHROMBIN TIME + INR Collection Time: 05/25/20  5:09 AM  
Result Value Ref Range Prothrombin time 13.8 11.5 - 15.2 sec INR 1.1 0.8 - 1.2 PTT Collection Time: 05/25/20  5:09 AM  
Result Value Ref Range aPTT 81.1 (H) 23.0 - 36.4 SEC  
LD Collection Time: 05/25/20  5:09 AM  
Result Value Ref Range  (H) 81 - 234 U/L  
D DIMER Collection Time: 05/25/20  5:09 AM  
Result Value Ref Range D DIMER 0.40 <0.46 ug/ml(FEU) FERRITIN Collection Time: 05/25/20  5:09 AM  
Result Value Ref Range Ferritin 76 8 - 388 NG/ML  
C REACTIVE PROTEIN, QT Collection Time: 05/25/20  5:09 AM  
Result Value Ref Range C-Reactive protein 1.1 (H) 0 - 0.3 mg/dL PHOSPHORUS Collection Time: 05/25/20  5:09 AM  
Result Value Ref Range Phosphorus 3.3 2.5 - 4.9 MG/DL  
METABOLIC PANEL, BASIC  Collection Time: 05/25/20  5:09 AM  
 Result Value Ref Range Sodium 140 136 - 145 mmol/L Potassium 3.6 3.5 - 5.5 mmol/L Chloride 99 (L) 100 - 111 mmol/L  
 CO2 40 (H) 21 - 32 mmol/L Anion gap 1 (L) 3.0 - 18 mmol/L Glucose 97 74 - 99 mg/dL BUN 10 7.0 - 18 MG/DL Creatinine 0.61 0.6 - 1.3 MG/DL  
 BUN/Creatinine ratio 16 12 - 20 GFR est AA >60 >60 ml/min/1.73m2 GFR est non-AA >60 >60 ml/min/1.73m2 Calcium 8.2 (L) 8.5 - 10.1 MG/DL  
CBC WITH AUTOMATED DIFF Collection Time: 05/25/20  5:09 AM  
Result Value Ref Range WBC 10.8 4.6 - 13.2 K/uL  
 RBC 3.56 (L) 4.20 - 5.30 M/uL HGB 8.9 (L) 12.0 - 16.0 g/dL HCT 28.9 (L) 35.0 - 45.0 % MCV 81.2 74.0 - 97.0 FL  
 MCH 25.0 24.0 - 34.0 PG  
 MCHC 30.8 (L) 31.0 - 37.0 g/dL  
 RDW 17.4 (H) 11.6 - 14.5 % PLATELET 716 193 - 605 K/uL MPV 10.5 9.2 - 11.8 FL  
 NEUTROPHILS 66 42 - 75 % LYMPHOCYTES 24 20 - 51 % MONOCYTES 10 (H) 2 - 9 % EOSINOPHILS 0 0 - 5 % BASOPHILS 0 0 - 3 %  
 ABS. NEUTROPHILS 7.1 1.8 - 8.0 K/UL  
 ABS. LYMPHOCYTES 2.6 0.8 - 3.5 K/UL  
 ABS. MONOCYTES 1.1 (H) 0 - 1.0 K/UL  
 ABS. EOSINOPHILS 0.0 0.0 - 0.4 K/UL  
 ABS. BASOPHILS 0.0 0.0 - 0.06 K/UL  
 DF MANUAL PLATELET COMMENTS ADEQUATE PLATELETS    
 RBC COMMENTS ANISOCYTOSIS 1+ 
    
 RBC COMMENTS HYPOCHROMIA 1+ MAGNESIUM Collection Time: 05/25/20  5:09 AM  
Result Value Ref Range Magnesium 1.9 1.6 - 2.6 mg/dL GLUCOSE, POC Collection Time: 05/25/20  7:31 AM  
Result Value Ref Range Glucose (POC) 161 (H) 70 - 110 mg/dL GLUCOSE, POC Collection Time: 05/25/20 10:53 AM  
Result Value Ref Range Glucose (POC) 108 70 - 110 mg/dL GLUCOSE, POC Collection Time: 05/25/20  3:49 PM  
Result Value Ref Range Glucose (POC) 186 (H) 70 - 110 mg/dL Signed By: Phil Allison MD   
 May 25, 2020 Dragon medical dictation software was used for portions of this report. Unintended errors may occur.

## 2020-05-26 NOTE — ROUTINE PROCESS
Bedside and Verbal shift change report given to Laraine Prader, RN (oncoming nurse) by Gabriel Summers (offgoing nurse). Report included the following information SBAR, Kardex and Recent Results.

## 2020-05-26 NOTE — ROUTINE PROCESS
Bedside shift change report given to Patric Griffith RN. Report included SBAR, Kardex, MAR, Recent Results, and Plan of Care.

## 2020-05-26 NOTE — PROGRESS NOTES
SUBJECTIVE: 
 
Patient is feeling much better. She states she has been using 3 L home oxygen. No chest pain or abdominal pain. Shortness of breath is much better. Dyspnea exertion is at its baseline. No cough. No headaches or dizziness. No nausea or vomiting. States she has plenty of support at home. OBJECTIVE: 
 
/70 (BP 1 Location: Right arm, BP Patient Position: At rest)   Pulse 82   Temp 100.1 °F (37.8 °C)   Resp 20   Ht 5' 2\" (1.575 m)   Wt 74.3 kg (163 lb 14.4 oz)   LMP 11/25/2012 (Exact Date)   SpO2 96%   Breastfeeding No   BMI 29.98 kg/m² Intake/Output Summary (Last 24 hours) at 5/26/2020 1511 Last data filed at 5/26/2020 1255 Gross per 24 hour Intake 680 ml Output 1200 ml Net -520 ml General appearance - alert, well appearing, and in no distress Chest -clear air entry noted in bases, no wheezes Heart - S1 and S2 normal 
Abdomen - soft, nontender, nondistended, Bowel sounds present Neurological - alert, oriented, normal speech, no focal findings noted while in bed Extremities - no pedal edema noted ASSESSMENT: 
 
1. Shortness of breath, multifactorial 
2. Acute on chronic diastolic CHF, improving 3. Non-STEMI 4. Bronchitis with atelectasis, pneumonia ruled out after study 5. Chronic hypoxic respiratory failure on home oxygen 6. COPD without exacerbation 7. Pulmonary hypertension 8. Sarcoidosis 9. Polysubstance use disorder 10. Paroxysmal A. fib, rate controlled 11. Hypothyroidism 12. Diabetes mellitus with hyperglycemia 13. Sickle cell trait with anemia 14. Midline abdominal wound without infection PLAN: 
 
Continue current management Cardiology input noted about further cardiac work-up for tomorrow and continue heparin drip We will change antibiotic by mouth COVID-19 test is negative Patient will be moved to telemetry floor as there is very low clinical suspicion is patient's having false negative Disclaimer: Sections of this note are dictated using utilizing voice recognition software, which may have resulted in some phonetic based errors in grammar and contents. Even though attempts were made to correct all the mistakes, some may have been missed, and remained in the body of the document. If questions arise, please contact our department. Recent Results (from the past 24 hour(s)) GLUCOSE, POC Collection Time: 05/25/20  3:49 PM  
Result Value Ref Range Glucose (POC) 186 (H) 70 - 110 mg/dL GLUCOSE, POC Collection Time: 05/25/20  8:37 PM  
Result Value Ref Range Glucose (POC) 163 (H) 70 - 110 mg/dL FIBRINOGEN Collection Time: 05/26/20  5:08 AM  
Result Value Ref Range Fibrinogen 437 210 - 451 mg/dL PROTHROMBIN TIME + INR Collection Time: 05/26/20  5:08 AM  
Result Value Ref Range Prothrombin time 13.5 11.5 - 15.2 sec INR 1.1 0.8 - 1.2 PTT Collection Time: 05/26/20  5:08 AM  
Result Value Ref Range aPTT 69.8 (H) 23.0 - 36.4 SEC  
LD Collection Time: 05/26/20  5:08 AM  
Result Value Ref Range  81 - 234 U/L  
D DIMER Collection Time: 05/26/20  5:08 AM  
Result Value Ref Range D DIMER 0.56 (H) <0.46 ug/ml(FEU) FERRITIN Collection Time: 05/26/20  5:08 AM  
Result Value Ref Range Ferritin 79 8 - 388 NG/ML  
C REACTIVE PROTEIN, QT Collection Time: 05/26/20  5:08 AM  
Result Value Ref Range C-Reactive protein 1.2 (H) 0 - 0.3 mg/dL PHOSPHORUS Collection Time: 05/26/20  5:08 AM  
Result Value Ref Range Phosphorus 3.5 2.5 - 4.9 MG/DL  
CBC WITH AUTOMATED DIFF Collection Time: 05/26/20  5:08 AM  
Result Value Ref Range WBC 11.4 4.6 - 13.2 K/uL  
 RBC 3.57 (L) 4.20 - 5.30 M/uL HGB 8.8 (L) 12.0 - 16.0 g/dL HCT 29.3 (L) 35.0 - 45.0 % MCV 82.1 74.0 - 97.0 FL  
 MCH 24.6 24.0 - 34.0 PG  
 MCHC 30.0 (L) 31.0 - 37.0 g/dL  
 RDW 17.2 (H) 11.6 - 14.5 % PLATELET 858 129 - 544 K/uL  MPV 9.7 9.2 - 11.8 FL  
 NEUTROPHILS 65 40 - 73 % LYMPHOCYTES 28 21 - 52 % MONOCYTES 6 3 - 10 % EOSINOPHILS 1 0 - 5 % BASOPHILS 0 0 - 2 %  
 ABS. NEUTROPHILS 7.5 1.8 - 8.0 K/UL  
 ABS. LYMPHOCYTES 3.2 0.9 - 3.6 K/UL  
 ABS. MONOCYTES 0.7 0.05 - 1.2 K/UL  
 ABS. EOSINOPHILS 0.1 0.0 - 0.4 K/UL  
 ABS. BASOPHILS 0.0 0.0 - 0.1 K/UL  
 DF AUTOMATED METABOLIC PANEL, BASIC Collection Time: 05/26/20  5:08 AM  
Result Value Ref Range Sodium 139 136 - 145 mmol/L Potassium 3.5 3.5 - 5.5 mmol/L Chloride 99 (L) 100 - 111 mmol/L  
 CO2 37 (H) 21 - 32 mmol/L Anion gap 3 3.0 - 18 mmol/L Glucose 100 (H) 74 - 99 mg/dL BUN 10 7.0 - 18 MG/DL Creatinine 0.55 (L) 0.6 - 1.3 MG/DL  
 BUN/Creatinine ratio 18 12 - 20 GFR est AA >60 >60 ml/min/1.73m2 GFR est non-AA >60 >60 ml/min/1.73m2 Calcium 8.0 (L) 8.5 - 10.1 MG/DL MAGNESIUM Collection Time: 05/26/20  5:08 AM  
Result Value Ref Range Magnesium 1.6 1.6 - 2.6 mg/dL GLUCOSE, POC Collection Time: 05/26/20  8:21 AM  
Result Value Ref Range Glucose (POC) 93 70 - 110 mg/dL GLUCOSE, POC Collection Time: 05/26/20 11:14 AM  
Result Value Ref Range Glucose (POC) 121 (H) 70 - 110 mg/dL

## 2020-05-26 NOTE — ROUTINE PROCESS
Pt arrived from [address] [place] [address] to room 410, pt was made comfortable and given snacks per request. 02 at 3 liter via nasal cannuld on no distress noted.

## 2020-05-26 NOTE — PROGRESS NOTES
Chart reviewed. Pt has in home supports 10hrs/day. Plan continues to be home with home health via [facility]. .  Will continue to monitor for discharge needs. Noy Barillas RN - Outcomes Manager  [phone]

## 2020-05-26 NOTE — ROUTINE PROCESS
TRANSFER - IN REPORT: 
 
Verbal report received from Karl MCDONALD Rn(name) on Stephen RODRIGUEZ Roots  being received from Ousmane RODRIGUEZ Rn(unit) for routine progression of care Report consisted of patients Situation, Background, Assessment and  
Recommendations(SBAR). Information from the following report(s) SBAR, Kardex and Recent Results was reviewed with the receiving nurse. Opportunity for questions and clarification was provided. Assessment completed upon patients arrival to unit and care assumed.

## 2020-05-26 NOTE — PROGRESS NOTES
[facility] 
 
 
CARDIOLOGY PROGRESS NOTE 
RECS: 
 
 
1. NSTEMI- with positive troponin now down trending- patient is chest pain free. Cardiac w/u tomorrow NUC stress to r/o CAD. Heparin drip changed to Lovenox SQ bid. 2. Possible pneumonia- managed by medical team. Nomi Mcneal not detected 3. Paroxymal Atrial flutter/atrial fibrillation- maintaining NSR- continue  amiodarone po. Patient was on eliquis at home 4. Acute on Chronic Congestive heart failure-compensated now. Continue lasix po.  
5. Uncontrolled Hypertension- on admission. She was initial placed on Cardene drip.  now with low Normal BP-hold BP medications will monitor. 6. Recent Acute Sigmoid Colon Perforation-S/p colectomy and transverse colostomy on 3/1/20  
7. Hx sarcoidosis- on chronic prednisone  
8. Pulmonary hypertension with PAP 57 mmHg  on echo on 2/20 9. Hx of COPD, on chronic 3L NC at home. 10. Hx of Cocaine and Heroin Abuse - on methadone. Cardiac status is stable and patient is asymptomatic from cardiac standpoint. Will change heparin to Lovenox. Stress test tomorrow as patient is bedridden. No invasive work-up unless large ischemia is noted. Discussed with patient. She agrees. 
  
    
02/29/20 ECHO ADULT FOLLOW-UP OR LIMITED 03/17/2020 3/17/2020  
  Narrative · Normal cavity size and systolic function (ejection fraction normal). Estimated left ventricular ejection fraction is 55 - 60%. Visually  
measured ejection fraction. No regional wall motion abnormality noted. · Mild tricuspid valve regurgitation is present. · Moderate pulmonary hypertension. Pulmonary arterial systolic pressure is 57 mmHg. · Right Ventricle: Normal cavity size and global systolic function. Normal  
wall motion. 
   
    Signed by: Leyla Alvarado MD  
  
 
 
ASSESSMENT: 
Hospital Problems  Date Reviewed: 2/28/2020 Codes Class Noted POA Colostomy care [facility]) ICD-10-CM: Z43.3 ICD-9-CM: V55.3  5/23/2020 Yes NSTEMI (non-ST elevated myocardial infarction) ([facility] [address] ICD-10-CM: I21.4 ICD-9-CM: 410.70  5/22/2020 Yes Suspected COVID-19 virus infection ICD-10-CM: Z20.828 ICD-9-CM: V01.79  5/21/2020 Yes Acute on chronic diastolic (congestive) heart failure (HCC) ICD-10-CM: I50.33 ICD-9-CM: 428.33, 428.0  5/21/2020 Yes * (Principal) HCAP (healthcare-associated pneumonia) ICD-10-CM: J18.9 ICD-9-CM: 700  5/16/2020 Yes Pulmonary hypertension, moderate to severe (HCC) (Chronic) ICD-10-CM: I27.20 ICD-9-CM: 416.8  5/16/2020 Yes PAF (paroxysmal atrial fibrillation) (HCC) (Chronic) ICD-10-CM: I48.0 ICD-9-CM: 427.31  4/8/2020 Yes Overview Signed 4/8/2020  9:53 AM by Jim Yeh MD  
  Noted in hospital.  She was started on amiodarone and Eliquis continue current treatment stable Compression fracture of body of thoracic vertebra (HCC) (Chronic) ICD-10-CM: C45.130C ICD-9-CM: 805.2  12/11/2019 Yes Gastroesophageal reflux disease with hiatal hernia (Chronic) ICD-10-CM: K21.9, K44.9 ICD-9-CM: 530.81, 553.3  Unknown Yes Recurrent genital herpes (Chronic) ICD-10-CM: A60.00 ICD-9-CM: 054.10  5/31/2013 Yes Sarcoidosis (Chronic) ICD-10-CM: T24.8 ICD-9-CM: 135  Unknown Yes Chronic obstructive pulmonary disease (COPD) (HCC) (Chronic) ICD-10-CM: J44.9 ICD-9-CM: 403  Unknown Yes Overview Signed 4/23/2013 10:01 AM by Madelyn Krabbe H SOB, on paula O2 Recent admission with psychosis Type 2 diabetes mellitus with diabetic neuropathy, with long-term current use of insulin (HCC) (Chronic) ICD-10-CM: E11.40, Z79.4 ICD-9-CM: 250.60, 357.2, V58.67  Unknown Yes Overview Signed 12/18/2019  2:13 PM by Shahram Rodrigues MD  
  HbA1c (12/11/2019) = 7.1 SUBJECTIVE: 
No CP No SOB Cough improving OBJECTIVE: 
 
VS:  
Visit Vitals /70 (BP 1 Location: Right arm, BP Patient Position: At rest) Pulse 82 Temp 100.1 °F (37.8 °C) Resp 20 Ht 5' 2\" (1.575 m) Wt 74.3 kg (163 lb 14.4 oz) SpO2 96% Breastfeeding No  
BMI 29.98 kg/m² Intake/Output Summary (Last 24 hours) at 5/26/2020 1455 Last data filed at 5/26/2020 1255 Gross per 24 hour Intake 680 ml Output 1200 ml Net -520 ml  
 
TELE: normal sinus rhythm General: alert, well developed, chronically ill, pleasant and in no apparent distress HENT: Normocephalic, atraumatic. Normal external eye. Neck :  JVD difficult to assess due to obesity Cardiac:  regular rate and rhythm, S1, S2 normal, no click, no rub Lungs:  diminished breath sounds b/l. Abdomen: Soft, nontender, no masses Extremities:  No c/c/e, peripheral pulses present Labs: Results:  
   
Chemistry Recent Labs  
  05/26/20 
2629 05/25/20 
9137 05/24/20 
0038 * 97 279*  140 142  
K 3.5 3.6 4.1 CL 99* 99* 99* CO2 37* 40* 39* BUN 10 10 12 CREA 0.55* 0.61 0.72  
CA 8.0* 8.2* 7.1* AGAP 3 1* 4 BUCR 18 16 17 CBC w/Diff Recent Labs  
  05/26/20 
0508 05/25/20 
0509 05/24/20 
0038 WBC 11.4 10.8 8.8  
RBC 3.57* 3.56* 3.42* HGB 8.8* 8.9* 8.6* HCT 29.3* 28.9* 28.1*  
 176 154 GRANS 65 66 76* LYMPH 28 24 18* EOS 1 0 0 Cardiac Enzymes No results for input(s): CPK, CKND1, SANTOS in the last 72 hours. No lab exists for component: Khloe Cabrera Coagulation Recent Labs  
  05/26/20 
0508 05/25/20 
0509 PTP 13.5 13.8 INR 1.1 1.1 APTT 69.8* 81.1* Lipid Panel Lab Results Component Value Date/Time Cholesterol, total 141 09/30/2019 12:00 AM  
 HDL Cholesterol 39 (L) 09/30/2019 12:00 AM  
 LDL, calculated 61 09/30/2019 12:00 AM  
 VLDL, calculated 41 (H) 09/30/2019 12:00 AM  
 Triglyceride 204 (H) 09/30/2019 12:00 AM  
 CHOL/HDL Ratio 3.5 11/06/2016 04:18 AM  
  
BNP No results for input(s): BNPP in the last 72 hours. Liver Enzymes No results for input(s): TP, ALB, TBIL, AP, SGOT, GPT in the last 72 hours. No lab exists for component: DBIL Thyroid Studies Lab Results Component Value Date/Time TSH 0.53 05/17/2020 05:00 AM  
    
 
 
 
[address] NP-C Liz:[phone] I have independently evaluated and examined the patient. All relevant labs and testing data are reviewed. Care plan discussed and updated after review.  
Venancio Troncoso MD

## 2020-05-26 NOTE — ROUTINE PROCESS
Bedside shift change report received from [facility]ekha [address]. Report included SBAR, Kardex, MAR, Recent Results, and Plan of Care.

## 2020-05-27 NOTE — PROGRESS NOTES
Problem: Mobility Impaired (Adult and Pediatric) Goal: *Acute Goals and Plan of Care (Insert Text) Description: Physical Therapy Goals Initiated 5/25/2020 and to be accomplished within 4 day(s) 1. Patient will perform rolling in bed with moderate assistance. 2.  Patient will move from supine to sit and sit to supine  in bed with moderate assistance . 3. Patient will tolerate sitting EOB for 8 minutes with fair balance. 4.  Patient will transfer from bed to chair and chair to bed with moderate assistance  using the least restrictive device. 5.  Patient will perform sit to stand with moderate assistance. 6.  Patient will tolerate static supported standing for 1 minute with fair balance. PLOF: Patient reports she was receiving home PT working on supported standing for 1 minute. She has care aid and LPN who assists with self care and functional mobility. Outcome: Progressing Towards Goal 
 PHYSICAL THERAPY TREATMENT Patient: Drake Rodney (65 y.o. female) Date: 5/27/2020 Diagnosis: HCAP (healthcare-associated pneumonia) [J18.9] Suspected COVID-19 virus infection [Z20.828] HCAP (healthcare-associated pneumonia) Precautions: Fall, Skin, Aspiration ASSESSMENT: 
Nurse consulted prior to treatment. Patient received reclined in bed, awake and alert, agreeable to therapy. Session completed with OT to maximize patient safety. Patient agreeable to sitting at EOB for activity with gentle encouragement. Supine to sit transfer with max assist x 2 people with patient assisting with UE on hand rail. Max assist x 2 for scooting to EOB. Patient very aware of sacral skin integrity and refused to scoot to EOB stating she had \"bed sores. \" Patient declined to scoot with assistance to EOB with feet on floor to promote balance. Patient participated with seated ADL with OT with PT providing support and verbal and tactile cues for midline posture while sitting at EOB.  Patient tolerated 10 minutes sitting with CGA to min assist to correct posture as she would lean to left when fatigued. Patient able to perform seated long arc quads with PT guidance x 5 each leg. Patient returned to supine after declining twice to stand with PT and OT. Sit to supine transfer max assist x 2 with patient assisting at hand rail. Patient required max assist x 2 for scooting toward head of bed in supine with patient assisting at overhead rail. Rolled bilaterally with moderate assist and minimal cues for libby change. Patient left reclined right sidelying in bed with pillows behind back to offload sacrum, HOB elevated, in NAD, all needs met, call bell in reach. Patient educated on ankle pumps every hour and performed 10 active ankle pumps each foot with good return demonstration and understanding. Progression toward goals:  
[x]      Improving appropriately and progressing toward goals 
[]      Improving slowly and progressing toward goals 
[]      Not making progress toward goals and plan of care will be adjusted PLAN: 
Patient continues to benefit from skilled intervention to address the above impairments. Continue treatment per established plan of care. Discharge Recommendations:  Home Health Further Equipment Recommendations for Discharge:  patient is equipped SUBJECTIVE:  
Patient stated I've got bed sores, I'm not scooting. I'm not standing today. Maybe tomorrow. But I don't have the spirits for it today.  OBJECTIVE DATA SUMMARY:  
Critical Behavior: 
Neurologic State: Alert Orientation Level: Oriented X4 Cognition: Follows commands Safety/Judgement: Fall prevention Functional Mobility Training: 
Bed Mobility: 
Rolling: Moderate assistance;Assist x1 Supine to Sit: Maximum assistance;Assist x2 Sit to Supine: Maximum assistance;Assist x2 Scooting: Maximum assistance;Assist x2 Transfers: 
Declined twice Balance: 
Sitting: With support; Impaired Sitting - Static: Fair (occasional)(-) Sitting - Dynamic: Fair (occasional)(-) Standing: (refused twice) Therapeutic Exercises:  
Seated long arc quads x 5 each leg and supine ankle pumps x 15 each foot EXERCISE Sets Reps Active Active Assist  
Passive Self ROM Comments Ankle Pumps 1 15  [x] [] [] [] Quad Sets/Glut Sets    [] [] [] [] Hold for 5 secs Hamstring Sets   [] [] [] [] Short Arc Quads   [] [] [] [] Heel Slides   [] [] [] [] Straight Leg Raises   [] [] [] [] Hip Add   [] [] [] [] Hold for 5 secs, w/ pillow squeeze Long Arc Quads 1 5 [x] [] [] [] Seated Marching   [] [] [] []   
Standing Marching   [] [] [] []   
   [] [] [] []   
 
 
Pain: 
Pain level pre-treatment: 0/10 Pain level post-treatment: 0/10 Pain Intervention(s): Medication (see MAR); Rest,  Repositioning Response to intervention: Nurse notified, See doc flow Activity Tolerance: Tolerated 10 minutes sitting EOB Please refer to the flowsheet for vital signs taken during this treatment. After treatment:  
[] Patient left in no apparent distress sitting up in chair 
[x] Patient left in no apparent distress in bed 
[x] Call bell left within reach 
[] Nursing notified 
[] Caregiver present 
[] Bed alarm activated 
[] SCDs applied COMMUNICATION/EDUCATION:  
[x]         Role of Physical Therapy in the acute care setting. [x]         Fall prevention education was provided and the patient/caregiver indicated understanding. [x]         Patient/family have participated as able in working toward goals and plan of care. []         Patient/family agree to work toward stated goals and plan of care. []         Patient understands intent and goals of therapy, but is neutral about his/her participation. []         Patient is unable to participate in stated goals/plan of care: ongoing with therapy staff. 
[]         Other: 
 
   
Bing Oh DPT Time Calculation: 26 mins

## 2020-05-27 NOTE — PROGRESS NOTES
Problem: Dysphagia (Adult) Goal: *Acute Goals and Plan of Care (Insert Text) Description: Patient will: 1. Tolerate PO trials with 0 s/s overt distress in 4/5 trials 2. Utilize compensatory swallow strategies/maneuvers (decrease bite/sip, size/rate, alt. liq/sol) with min cues in 4/5 trials 3. Perform oral-motor/laryngeal exercises to increase oropharyngeal swallow function with min cues 4. Complete an objective swallow study (i.e., MBSS) to assess swallow integrity, r/o aspiration, and determine of safest LRD, min A Rec:    
Reg solid with thin liquids Aspiration precautions HOB >45 during po intake, remain >30 for 30-45 minutes after po Small bites/sips; alternate liquid/solid with slow feeding rate Oral care TID Meds per pt preference MBS next business date to further assess oropharyngeal swallow fxn Outcome: Progressing Towards Goal 
  
SPEECH LANGUAGE PATHOLOGY BEDSIDE SWALLOW EVALUATION/TREATMENT Patient: Jose Astudillo (65 y.o. female) Date: 5/27/2020 Primary Diagnosis: HCAP (healthcare-associated pneumonia) [J18.9] Suspected COVID-19 virus infection [Z20.828] Precautions:   Fall, Skin, Aspiration PLOF: As per H&P 
 
ASSESSMENT : 
Based on the objective data described below, the patient presents with mild pharyngeal dysphagia. Pt tolerated reg solid, puree, and thin liquid consistencies without any overt s/sx of aspiration and positive oral clearance. She does have a history of airway comp in the past and was recommended honey-thick liquids during last hospitalization. Although no coughing/choking was noted during examination, pt reported that she \"coughed up a bunch of cream phlegm during lunch. \" Laryngeal elevation appeared weak to palpation. Given CXR results, history of dysphagia, and bedside presentation: recommend MBS next business date to further assess oropharyngeal swallow fxn and r/o aspiration.  Until then, recommend reg solid diet with thin liquids, aspiration precautions, oral care TID, and meds as tolerated. ST will continue to follow. TREATMENT : 
Skilled therapy initiated; Educated pt on aspiration precautions and importance of compensatory swallow techniques to decrease aspiration risk (decrease rate of intake & sip/bite size, upright @HOB for all po intake and ~30 minutes after po); verbalized comprehension. Patient will benefit from skilled intervention to address the above impairments. Patient's rehabilitation potential is considered to be Good Factors which may influence rehabilitation potential include:  
[x]            Mental ability/status [x]            Medical condition PLAN : 
Recommendations and Planned Interventions: See above Frequency/Duration: Patient will be followed by speech-language pathology 1-2 times per day/3-5 days per week to address goals. Discharge Recommendations: Home Health, [facility], and To Be Determined SUBJECTIVE:  
Patient stated A bunch of cream phlegm came out the front door. OBJECTIVE:  
 
Past Medical History:  
Diagnosis Date  Abnormally low high density lipoprotein (HDL) cholesterol with hypertriglyceridemia Lipid profile (11/6/2016) showed , , HDL 38, LDL 37  Acute blood loss as cause of postoperative anemia 12/12/2019  Acute on chronic diastolic (congestive) heart failure ([address] 5/21/2020  Anxiety  Bipolar affective disorder ([address] 12/5/2012  Cellulitis of right forearm 05/04/2017  Chronic back pain  Chronic obstructive pulmonary disease (COPD) ([address] SOB, on paula O2 Recent admission with psychosis  Chronic respiratory failure with hypoxia ([address] On home oxygen inhalation at 3 LPM via NC  
 Contusion of left elbow 5/4/2017  Depression  Diabetic neuropathy associated with type 2 diabetes mellitus ([address]  Diastolic dysfunction without heart failure Stable on diuretics  Falls frequently  Gastroesophageal reflux disease with hiatal hernia  Generalized osteoarthritis of multiple sites  Gout On Allopurinol  History of acute renal failure 2013  History of back injury   
 jumped out of second story window  History of fracture due to fall 2019  
 History of hepatitis C   
 treated  History of penicillin allergy  History of vitamin D deficiency 5/10/2017  Hypertensive heart disease without heart failure Better controlled  Hyperuricemia 2017  Hypothyroidism  Hypoxemia requiring supplemental oxygen 2014  Intravenous drug user 2017  Long term current use of aspirin  Memory difficulty  Menopause  Mixed connective tissue disease ([address] 5/10/2017  Obesity, Class I, BMI 30-34.9  Olecranon bursitis of right elbow 2017  Opioid dependence ([address] On Methadone  Recurrent genital herpes 2013  Right Achilles tendinitis 2017  Sarcoidosis  Sickle cell trait ([address]  Tobacco use disorder  Type 2 diabetes mellitus with diabetic neuropathy, with long-term current use of insulin ([facility] [address] HbA1c (2019) = 7.1  Urge urinary incontinence 5/10/2017  Venous insufficiency  Wears glasses Past Surgical History:  
Procedure Laterality Date [address]  HX  SECTION    
 HX COLOSTOMY  2020 Colectomy with transverse colostomy  HX CYST REMOVAL Left  Left foot  HX OTHER SURGICAL Left 2017 S/P incision and drainage of the abscess of the left hand and the left foot (2017 - Dr. Jessica Millan. [facility])  HX THORACIC LAMINECTOMY  2019 S/P T10, T9, T8 laminectomy; T7, T8, T9, T10, T11, T12 posterior thoracic fusion; segmental spinal instrumentation; K2M Davis type, T7-T8, T11-T12 (2019 - Dr. Darrian Hoskins)  HX TUBAL LIGATION Prior Level of Function/Home Situation: see below Home Situation Home Environment: Private residence # Steps to Enter: 4 One/Two Story Residence: One story Living Alone: No 
Support Systems: Family member(s), Child(chio) Patient Expects to be Discharged to[de-identified] Private residence Current DME Used/Available at Home: Wheelchair Diet prior to admission: reg solid with thin Current Diet:  reg solid with thin   
 
Cognitive and Communication Status: 
Neurologic State: Alert Orientation Level: Oriented X4 Cognition: Follows commands Perception: Appears intact Perseveration: No perseveration noted Safety/Judgement: Fall prevention Oral Assessment: 
Oral Assessment Labial: No impairment Dentition: Upper dentures Oral Hygiene: adequate Lingual: No impairment Velum: No impairment Mandible: No impairment P.O. Trials: 
Patient Position: 56 at [facility] Vocal quality prior to P.O.: No impairment Consistency Presented: Thin liquid; Solid;Puree How Presented: Self-fed/presented;Cup/sip;Spoon;Straw Bolus Acceptance: No impairment Bolus Formation/Control: No impairment Propulsion: No impairment Oral Residue: None Initiation of Swallow: No impairment Laryngeal Elevation: Weak Aspiration Signs/Symptoms: Infiltrate on chest xray(reports of coughing with PO) Pharyngeal Phase Characteristics: Suspected pharyngeal residue;Poor endurance; Foreign body sensation Effective Modifications: Small sips and bites; Alternate liquids/solids Cues for Modifications: Moderate Oral Phase Severity: No impairment Pharyngeal Phase Severity : Mild-moderate PAIN: 
Start of Eval: 0 End of Eval: 0 After treatment:  
[]            Patient left in no apparent distress sitting up in chair 
[x]            Patient left in no apparent distress in bed 
[x]            Call bell left within reach [x]            Nursing notified []            Family present 
[]            Caregiver present 
[]            Bed alarm activated COMMUNICATION/EDUCATION:  
 [x]            Aspiration precautions; swallow safety; compensatory techniques. [x]            Patient/family have participated as able in goal setting and plan of care. [x]         Posted safety precautions in patient's room. Thank you for this referral. 
 
Daniel Moore M.S. CCC-SLP/L Speech-Language Pathologist

## 2020-05-27 NOTE — PROGRESS NOTES
Bedside and Verbal shift change report given to [address] (oncoming nurse) by Ken Guillen RN 
 (offgoing nurse). Report given with SBAR, Kardex, MAR and Recent Results.

## 2020-05-27 NOTE — PROGRESS NOTES
[facility] 
 
 
CARDIOLOGY PROGRESS NOTE 
RECS: 
 
 
1. NSTEMI- with positive troponin now down trending- patient is chest pain free. Continue  Lovenox SQ bid. Low risk stress test-medical managment 2. pneumonia- managed by medical team. Katlyn Seller not detected 3. Paroxymal Atrial flutter/atrial fibrillation- maintaining NSR- continue  amiodarone po. Patient was on eliquis at home 4. Acute on Chronic Congestive heart failure-compensated now. Continue lasix po.  
5. Uncontrolled Hypertension- on admission. She was initial placed on Cardene drip. Then became hypotensive. now with stable bp. Added Low dose Coreg 6. Recent Acute Sigmoid Colon Perforation-S/p colectomy and transverse colostomy on 3/1/20  
7. Hx sarcoidosis- on chronic prednisone  
8. Pulmonary hypertension with PAP 57 mmHg  on echo on 2/20 9. Hx of COPD, on chronic 3L NC at home. 10. Hx of Cocaine and Heroin Abuse - on methadone. Ok to d/c from cardiac point 02/29/20 ECHO ADULT FOLLOW-UP OR LIMITED 03/17/2020 3/17/2020  
  Narrative · Normal cavity size and systolic function (ejection fraction normal). Estimated left ventricular ejection fraction is 55 - 60%. Visually  
measured ejection fraction. No regional wall motion abnormality noted. · Mild tricuspid valve regurgitation is present. · Moderate pulmonary hypertension. Pulmonary arterial systolic pressure is 57 mmHg. · Right Ventricle: Normal cavity size and global systolic function. Normal  
wall motion. 
   
    Signed by: Ryan Jernigan MD  
  
 
 
ASSESSMENT: 
Hospital Problems  Date Reviewed: 2/28/2020 Codes Class Noted POA Colostomy care [facility]) ICD-10-CM: Z43.3 ICD-9-CM: V55.3  5/23/2020 Yes NSTEMI (non-ST elevated myocardial infarction) ([facility] [address] ICD-10-CM: I21.4 ICD-9-CM: 410.70  5/22/2020 Yes Suspected COVID-19 virus infection ICD-10-CM: Z20.828 ICD-9-CM: V01.79  5/21/2020 Yes Acute on chronic diastolic (congestive) heart failure (HCC) ICD-10-CM: I50.33 ICD-9-CM: 428.33, 428.0  5/21/2020 Yes * (Principal) HCAP (healthcare-associated pneumonia) ICD-10-CM: J18.9 ICD-9-CM: 130  5/16/2020 Yes Pulmonary hypertension, moderate to severe (HCC) (Chronic) ICD-10-CM: I27.20 ICD-9-CM: 416.8  5/16/2020 Yes PAF (paroxysmal atrial fibrillation) (HCC) (Chronic) ICD-10-CM: I48.0 ICD-9-CM: 427.31  4/8/2020 Yes Overview Signed 4/8/2020  9:53 AM by Eva Rivers MD  
  Noted in hospital.  She was started on amiodarone and Eliquis continue current treatment stable Compression fracture of body of thoracic vertebra (HCC) (Chronic) ICD-10-CM: R56.432X ICD-9-CM: 805.2  12/11/2019 Yes Gastroesophageal reflux disease with hiatal hernia (Chronic) ICD-10-CM: K21.9, K44.9 ICD-9-CM: 530.81, 553.3  Unknown Yes Recurrent genital herpes (Chronic) ICD-10-CM: A60.00 ICD-9-CM: 054.10  5/31/2013 Yes Sarcoidosis (Chronic) ICD-10-CM: R73.0 ICD-9-CM: 135  Unknown Yes Chronic obstructive pulmonary disease (COPD) (HCC) (Chronic) ICD-10-CM: J44.9 ICD-9-CM: 141  Unknown Yes Overview Signed 4/23/2013 10:01 AM by Nunu DE LA CRUZ, on paula O2 Recent admission with psychosis Type 2 diabetes mellitus with diabetic neuropathy, with long-term current use of insulin (HCC) (Chronic) ICD-10-CM: E11.40, Z79.4 ICD-9-CM: 250.60, 357.2, V58.67  Unknown Yes Overview Signed 12/18/2019  2:13 PM by Reyna Castillo MD  
  HbA1c (12/11/2019) = 7.1 SUBJECTIVE: 
No CP No SOB Cough improving OBJECTIVE: 
 
VS:  
Visit Vitals /75 (BP 1 Location: Right arm, BP Patient Position: Supine) Pulse 73 Temp 98.3 °F (36.8 °C) Resp 18 Ht 5' 2\" (1.575 m) Wt 73.9 kg (163 lb) SpO2 95% Breastfeeding No  
BMI 29.81 kg/m² Intake/Output Summary (Last 24 hours) at 5/27/2020 6051 Last data filed at 5/27/2020 7264 Gross per 24 hour Intake 780 ml Output 2250 ml Net -1470 ml TELE: normal sinus rhythm General: alert, well developed, chronically ill, pleasant and in no apparent distress HENT: Normocephalic, atraumatic. Normal external eye. Neck :  JVD difficult to assess due to short neck, no bruits Cardiac:  regular rate and rhythm, S1, S2 normal, no click, no rub Lungs:  diminished breath sounds b/l. Abdomen: Soft, nontender, no masses Extremities:  No c/c/e, peripheral pulses present Labs: Results:  
   
Chemistry Recent Labs  
  05/26/20 
6568 05/25/20 
2154 * 97  140  
K 3.5 3.6 CL 99* 99* CO2 37* 40* BUN 10 10 CREA 0.55* 0.61  
CA 8.0* 8.2* AGAP 3 1*  
BUCR 18 16 CBC w/Diff Recent Labs  
  05/26/20 
0508 05/25/20 
7576 WBC 11.4 10.8 RBC 3.57* 3.56* HGB 8.8* 8.9* HCT 29.3* 28.9*  
 176 GRANS 65 66 LYMPH 28 24 EOS 1 0 Cardiac Enzymes No results for input(s): CPK, CKND1, SANTOS in the last 72 hours. No lab exists for component: Gisel Reed Coagulation Recent Labs  
  05/27/20 
0504 05/26/20 
1607 05/26/20 
9161 PTP 13.6  --  13.5 INR 1.1  --  1.1 APTT 36.7* 50.4* 69.8* Lipid Panel Lab Results Component Value Date/Time Cholesterol, total 141 09/30/2019 12:00 AM  
 HDL Cholesterol 39 (L) 09/30/2019 12:00 AM  
 LDL, calculated 61 09/30/2019 12:00 AM  
 VLDL, calculated 41 (H) 09/30/2019 12:00 AM  
 Triglyceride 204 (H) 09/30/2019 12:00 AM  
 CHOL/HDL Ratio 3.5 11/06/2016 04:18 AM  
  
BNP No results for input(s): BNPP in the last 72 hours. Liver Enzymes No results for input(s): TP, ALB, TBIL, AP in the last 72 hours. No lab exists for component: SGOT, GPT, DBIL Thyroid Studies Lab Results Component Value Date/Time  TSH 0.53 05/17/2020 05:00 AM  
    
 
Vikki Franco MD

## 2020-05-27 NOTE — PROGRESS NOTES
Speech Pathology: Pt currently NPO for stress test. Will complete evaluation later this date or as medically appropriate. Thank you for this referral. 
 
Pam Jarvis M.S. CCC-SLP/L Speech-Language Pathologist

## 2020-05-27 NOTE — PROGRESS NOTES
conducted a Follow up consultation and Spiritual Assessment for Ish Soliz, who is a 62 y.o.,female. The  provided the following Interventions: 
Continued the relationship of care and support. Listened empathically. Offered prayer and assurance of continued prayer on patients behalf. Provided patient with a Bible (at request) and reading glasses (1.75).  addressed Advance Medical Directives with the patient at her request. Patient needs time to think about options.  completed visit with patient and offered Pastoral care. Chaplains will continue to follow and will provide pastoral care as needed or requested. Chart reviewed. The following outcomes were achieved: 
Patient expressed gratitude for 's visit. Assessment: 
There are no further spiritual or Scientology issues which require Spiritual Care Services interventions at this time. Plan: 
Chaplains will continue to follow and will provide pastoral care on an as needed/requested basis.  recommends bedside caregivers page  on duty if patient shows signs of acute spiritual or emotional distress. [address] [facility] Spiritual Care  
[phone]

## 2020-05-27 NOTE — PROGRESS NOTES
Problem: Self Care Deficits Care Plan (Adult) Goal: *Acute Goals and Plan of Care (Insert Text) Description: Description: Occupational Therapy Goals Initiated 5/25/2020 within 7 day(s). 1.  Patient will perform bed mobility with minimal assistance/contact guard assist in preparation for further self-care. 2.  Patient will perform upper body dressing with supervision/set-up. 3.  Patient will perform a functional activity sitting EOB with F sitting balance for 3-5 minutes. 4.  Patient will perform toilet transfers with minimal assistance/contact guard assist. 
5.  Patient will perform all aspects of toileting with moderate assistance . 6. Patient will participate in upper extremity therapeutic exercise/activities with minimal assistance/contact guard assist for 8 minutes. Prior Level of Function: Pt reports she was (I) with self-feeding, grooming, and UB ADLs, but required assist for LB ADLs PTA. Pt has a BSC but did not specify if or how much assistance she needed for functional transfers. Pt has a hospital bed. Pt has a PCA (8am-1pm, 5pm-10pm) and a friend who is an LPN. Pt was working on standing at sink level activities x1 min with home health PT. Pt did not report if she was receiving [facility] OT. Pt reports she has plans on moving to a handicap accessible home with her LPN friend. Outcome: Progressing Towards Goal 
 OCCUPATIONAL THERAPY TREATMENT Patient: Hiader Omalley (65 y.o. female) Date: 5/27/2020 Diagnosis: HCAP (healthcare-associated pneumonia) [J18.9] Suspected COVID-19 virus infection [Z20.828] HCAP (healthcare-associated pneumonia) Precautions: Fall, Skin, Aspiration PLOF: see above Chart, occupational therapy assessment, plan of care, and goals were reviewed. ASSESSMENT: 
Nursing/RN cleared for pt to participate in OT tx session. Pt seen in conjunction with PT to maximize safety of patient and staff members.  Bed mobility: Max A x 2 supine <-> sit edge of bed, use of bed rails to assist. Patient given verbal and tactile cues for correcting posterior and lateral lean for upright and midline posture while seated edge of bed w/ F- balance while washing her face x 10 mins sitting tolerance. Patient declined max encouragement for participating in sit to stand x 2 person assist for pressure relief d/t wounds on buttocks and in prep for SPT as performed at [facility] e.g. bed <-> w/c, pt stated \"Maybe next time, I just know today is not the day. \" 
Patient able to assist with scooting up towards head of bed with use of bed rails and Max A x 2. Patient assisted into sidelying on right for pressure relief and promote good skin integrity. Call bell within reach & pt verbalized understanding to utilize for assist e.g. functional transfers in order to prevent falls. Progression toward goals: 
[]          Improving appropriately and progressing toward goals [x]          Improving slowly and progressing toward goals 
[]          Not making progress toward goals and plan of care will be adjusted PLAN: 
Patient continues to benefit from skilled intervention to address the above impairments. Continue treatment per established plan of care. Discharge Recommendations:  Home Health with 24 hour supv & assist 
Further Equipment Recommendations for Discharge:  pt reports she has all DME recommended: hospital bed, w/c SUBJECTIVE:  
Patient stated I have someone with me at home around the clock.  OBJECTIVE DATA SUMMARY:  
Cognitive/Behavioral Status: 
Neurologic State: Alert Orientation Level: Oriented X4 Cognition: Follows commands Safety/Judgement: Fall prevention Functional Mobility and Transfers for ADLs: 
 Bed Mobility: 
Rolling: Moderate assistance;Assist x1 Supine to Sit: Maximum assistance;Assist x2 Sit to Supine: Maximum assistance;Assist x2 Scooting: Maximum assistance;Assist x2 Balance: Sitting: With support; Impaired Sitting - Static: Fair (occasional)(-) Sitting - Dynamic: Fair (occasional)(-) Standing: (refused twice) ADL Intervention: 
Grooming Washing Face: Stand-by assistance Washing Hands: Stand-by assistance Pain: 
Pain level pre-treatment: 0/10 Pain level post-treatment: 0/10 Pain Intervention(s): Medication (see MAR); Rest, Ice, Repositioning Response to intervention: Nurse notified, See doc flow Activity Tolerance:   
poor Please refer to the flowsheet for vital signs taken during this treatment. After treatment:  
[]  Patient left in no apparent distress sitting up in chair 
[x]  Patient left in no apparent distress in bed 
[x]  Call bell left within reach [x]  Nursing notified 
[]  Caregiver present 
[]  Bed alarm activated COMMUNICATION/EDUCATION:  
[x] Role of Occupational Therapy in the acute care setting 
[x] Home safety education was provided and the patient/caregiver indicated understanding. [x] Patient/family have participated as able in working towards goals and plan of care. [x] Patient/family agree to work toward stated goals and plan of care. [] Patient understands intent and goals of therapy, but is neutral about his/her participation. [] Patient is unable to participate in goal setting and plan of care. Thank you for this referral. 
Cheyanne Phillips Time Calculation: 29 mins

## 2020-05-27 NOTE — PROGRESS NOTES
Spoke with Juan Lebron of [facility] [place] [facility]. She stated they have discharged pt from [facility]. She stated pt needs a home health agency with psych nursing. Case Management will continue to follow. FRANK Ortiz RN Care Management Pager: [phone]

## 2020-05-27 NOTE — HOME CARE
This writer was notified by central office that patient was discharged from [facility] due to certification ending. Per central office Ethan Watts), clinicians feel she would be more appropriate for an agency with psych nursing. Left message for  on unit.   Haylie Borges LPN

## 2020-05-27 NOTE — ROUTINE PROCESS
Bedside shift change report given to Denae RN (oncoming nurse) by Alva Santana RN (offgoing nurse). Report included the following information SBAR, Kardex, Intake/Output, MAR and Recent Results.

## 2020-05-27 NOTE — PROGRESS NOTES
attempted to visit patient re request for additional information for AMD. Patient was not available. Will follow up with patient [address] [facility] Department [phone]

## 2020-05-27 NOTE — PROGRESS NOTES
SUBJECTIVE: 
 
 
Stress test was negative. Coreg added per cardiology. MBS recommended per SLP. Patient states she feels well, wants to know when she can go home. ASSESSMENT / PLAN: 
 
1. Shortness of breath, multifactorial - COVID-19 test is negative 2. Acute on chronic diastolic CHF, improving 3. Non-STEMI 4. Bronchitis with atelectasis, pneumonia ruled out after study 5. Chronic hypoxic respiratory failure on home oxygen 6. COPD without exacerbation 7. Pulmonary hypertension 8. Sarcoidosis 9. Polysubstance use disorder 10. Paroxysmal A. fib, rate controlled 11. Hypothyroidism 12. Diabetes mellitus with hyperglycemia 13. Sickle cell trait with anemia 14. Midline abdominal wound without infection 15. Metabolic alkalosis. 16. Anemia 17. Dysphagia - follow MBS and SLp recs. Disclaimer: Sections of this note are dictated using utilizing voice recognition software, which may have resulted in some phonetic based errors in grammar and contents. Even though attempts were made to correct all the mistakes, some may have been missed, and remained in the body of the document. If questions arise, please contact our department. OBJECTIVE: 
 
/75 (BP 1 Location: Right arm, BP Patient Position: Supine)   Pulse 73   Temp 98.3 °F (36.8 °C)   Resp 18   Ht 5' 2\" (1.575 m)   Wt 73.9 kg (163 lb)   LMP 11/25/2012 (Exact Date)   SpO2 95%   Breastfeeding No   BMI 29.81 kg/m² Intake/Output Summary (Last 24 hours) at 5/27/2020 8646 Last data filed at 5/27/2020 7018 Gross per 24 hour Intake 780 ml Output 2250 ml Net -1470 ml General appearance - alert, well appearing, and in no distress Chest -clear air entry noted in bases, no wheezes Heart - S1 and S2 normal 
Abdomen - soft, nontender, nondistended, Bowel sounds present Neurological - alert, oriented, normal speech, no focal findings noted while in bed Extremities - no pedal edema noted Recent Results (from the past 24 hour(s)) GLUCOSE, POC Collection Time: 05/26/20 11:14 AM  
Result Value Ref Range Glucose (POC) 121 (H) 70 - 110 mg/dL GLUCOSE, POC Collection Time: 05/26/20  3:45 PM  
Result Value Ref Range Glucose (POC) 133 (H) 70 - 110 mg/dL PTT Collection Time: 05/26/20  4:07 PM  
Result Value Ref Range aPTT 50.4 (H) 23.0 - 36.4 SEC GLUCOSE, POC Collection Time: 05/26/20  9:09 PM  
Result Value Ref Range Glucose (POC) 109 70 - 110 mg/dL PROTHROMBIN TIME + INR Collection Time: 05/27/20  5:04 AM  
Result Value Ref Range Prothrombin time 13.6 11.5 - 15.2 sec INR 1.1 0.8 - 1.2 PTT Collection Time: 05/27/20  5:04 AM  
Result Value Ref Range aPTT 36.7 (H) 23.0 - 36.4 SEC PHOSPHORUS Collection Time: 05/27/20  5:04 AM  
Result Value Ref Range Phosphorus 4.6 2.5 - 4.9 MG/DL  
GLUCOSE, POC Collection Time: 05/27/20  6:21 AM  
Result Value Ref Range Glucose (POC) 99 70 - 110 mg/dL NUCLEAR CARDIAC STRESS TEST Collection Time: 05/27/20  6:42 AM  
Result Value Ref Range Target  bpm  
EKG, 12 LEAD, SUBSEQUENT Collection Time: 05/27/20  7:14 AM  
Result Value Ref Range Ventricular Rate 69 BPM  
 Atrial Rate 69 BPM  
 P-R Interval 116 ms  
 QRS Duration 86 ms  
 Q-T Interval 426 ms  
 QTC Calculation (Bezet) 456 ms Calculated P Axis 47 degrees Calculated R Axis 34 degrees Calculated T Axis 70 degrees Diagnosis Normal sinus rhythm Possible Left atrial enlargement Borderline ECG When compared with ECG of 21-MAY-2020 04:15, 
Criteria for Septal infarct are no longer present

## 2020-05-27 NOTE — PROGRESS NOTES
Patient was given 11.0 milliCuries of 99mTc-Sestamibi for the resting images. Patient was also given 33.0 milliCuries of 99mTc-Sestamibi for the stress images. Injected with 0.4mg Lexiscan. Patient's armband was left on and sent back to room.

## 2020-05-28 NOTE — PROGRESS NOTES
2nd attempt at physical therapy. Patient politely refusing today despite patient education on importance of mobility and OOB activity. Patient stated, \"I just don't feel like it today. I need to call my home health nurse and my brother. I don't want to today. \" Physical therapy will continue to follow and continue when patient is willing to participate.   
 
Flaoc Joel, ROSET

## 2020-05-28 NOTE — ROUTINE PROCESS
Bedside shift change report given to Denae RN (oncoming nurse) by Eulalia Moffett RN (offgoing nurse). Report included the following information SBAR, Kardex, Intake/Output, MAR and Recent Results.

## 2020-05-28 NOTE — DISCHARGE SUMMARY
Discharge Summary Patient: Jojo Plaza MRN: [id number]  CSN: [id number] YOB: 1955  Age: 62 y.o. Sex: female DOA: 5/20/2020 LOS:  LOS: 7 days   Discharge Date:   
 
Admission Diagnoses: HCAP (healthcare-associated pneumonia) [J18.9] Suspected COVID-19 virus infection [Z20.828] Discharge Diagnoses:   
Problem List as of 5/28/2020 Date Reviewed: 2/28/2020 Codes Class Noted - Resolved Colostomy care [facility]) ICD-10-CM: Z43.3 ICD-9-CM: V55.3  5/23/2020 - Present Cardiomyopathy ([facility] ICD-10-CM: I42.9 ICD-9-CM: 425.4  5/23/2020 - Present Wheelchair bound ICD-10-CM: Z99.3 ICD-9-CM: V46.3  5/23/2020 - Present Paraplegia ([facility] ICD-10-CM: G82.20 ICD-9-CM: 344.1  5/23/2020 - Present Personality disorder ([facility] ICD-10-CM: F60.9 ICD-9-CM: 301.9  5/23/2020 - Present Centrilobular emphysema ([facility] ICD-10-CM: J43.2 ICD-9-CM: 492.8  5/23/2020 - Present NSTEMI (non-ST elevated myocardial infarction) ([facility] ICD-10-CM: I21.4 ICD-9-CM: 410.70  5/22/2020 - Present Suspected COVID-19 virus infection ICD-10-CM: Z20.828 ICD-9-CM: V01.79  5/21/2020 - Present Acute on chronic diastolic (congestive) heart failure (HCC) ICD-10-CM: I50.33 ICD-9-CM: 428.33, 428.0  5/21/2020 - Present Hypokalemia ICD-10-CM: E87.6 ICD-9-CM: 276.8  5/16/2020 - Present Hypomagnesemia ICD-10-CM: H16.99 
ICD-9-CM: 275.2  5/16/2020 - Present Respiratory distress ICD-10-CM: R06.03 
ICD-9-CM: 786.09  5/16/2020 - Present Acute on chronic respiratory failure with hypoxemia [facility]) ICD-10-CM: D36.02 
ICD-9-CM: 518.84  5/16/2020 - Present History of drug abuse ([facility] ICD-10-CM: F19.11 ICD-9-CM: 305.93  5/16/2020 - Present * (Principal) HCAP (healthcare-associated pneumonia) ICD-10-CM: J18.9 ICD-9-CM: 953  5/16/2020 - Present Polysubstance abuse ([facility] ICD-10-CM: F19.10 ICD-9-CM: 305.90  5/16/2020 - Present Pulmonary hypertension, moderate to severe (HCC) (Chronic) ICD-10-CM: I27.20 ICD-9-CM: 416.8  5/16/2020 - Present Elevated bilirubin ICD-10-CM: R17 
ICD-9-CM: 277.4  5/16/2020 - Present PAF (paroxysmal atrial fibrillation) (HCC) (Chronic) ICD-10-CM: I48.0 ICD-9-CM: 427.31  4/8/2020 - Present Overview Signed 4/8/2020  9:53 AM by Mao Sheppard MD  
  Noted in hospital.  She was started on amiodarone and Eliquis continue current treatment stable Perforated viscus ICD-10-CM: R19.8 ICD-9-CM: 799.89  2/29/2020 - Present Septic shock (HCC) ICD-10-CM: A41.9, R65.21 ICD-9-CM: 038.9, 785.52, 995.92  2/29/2020 - Present Sepsis ([address] ICD-10-CM: A41.9 ICD-9-CM: 038.9, 995.91  1/17/2020 - Present Chronic respiratory failure with hypoxia (HCC) (Chronic) ICD-10-CM: J96.11 
ICD-9-CM: 518.83, 799.02  Unknown - Present Overview Signed 12/16/2019 10:11 PM by Devin Capps MD  
  On home oxygen inhalation at 3 LPM via [place] Gout (Chronic) ICD-10-CM: M10.9 ICD-9-CM: 274.9  Unknown - Present Overview Signed 12/16/2019 10:12 PM by Devin Capps MD  
  On Allopurinol Menopause ICD-10-CM: Z78.0 ICD-9-CM: 627.2  Unknown - Present Long term current use of aspirin (Chronic) ICD-10-CM: Y08.66 ICD-9-CM: V58.66  Unknown - Present Opioid dependence (HCC) (Chronic) ICD-10-CM: N64.65 ICD-9-CM: 304.00  Unknown - Present Overview Signed 12/16/2019 10:54 PM by Devin Capps MD  
  On Methadone Tobacco use disorder (Chronic) ICD-10-CM: S71.155 ICD-9-CM: 305.1  Unknown - Present Acute blood loss as cause of postoperative anemia ICD-10-CM: D62 
ICD-9-CM: 285.1  12/12/2019 - Present Impaired mobility and ADLs ICD-10-CM: Z74.09, Z78.9 ICD-9-CM: V49.89  12/11/2019 - Present Spinal cord compression (HCC) ICD-10-CM: G95.20 ICD-9-CM: 336.9  12/11/2019 - Present Compression fracture of body of thoracic vertebra (HCC) (Chronic) ICD-10-CM: M13.354M ICD-9-CM: 805.2  12/11/2019 - Present Status post laminectomy with spinal fusion ICD-10-CM: Z98.1 ICD-9-CM: V45.4  12/11/2019 - Present Overview Signed 12/16/2019 10:05 PM by Michelle Abbott MD  
  S/P T10, T9, T8 laminectomy; T7, T8, T9, T10, T11, T12 posterior thoracic fusion; segmental spinal instrumentation; K2M Davis type, T7-T8, T11-T12 (12/11/2019 - Dr. Bernard Schmitt) History of fracture due to fall ICD-10-CM: Z87.81 ICD-9-CM: V15.51  12/11/2019 - Present Hyperuricemia (Chronic) ICD-10-CM: E79.0 ICD-9-CM: 790.6  5/26/2017 - Present History of penicillin allergy ICD-10-CM: Z88.0 ICD-9-CM: V14.0  Unknown - Present Falls frequently ICD-10-CM: R29.6 ICD-9-CM: V15.88  Unknown - Present Gastroesophageal reflux disease with hiatal hernia (Chronic) ICD-10-CM: K21.9, K44.9 ICD-9-CM: 530.81, 553.3  Unknown - Present Memory difficulty ICD-10-CM: R41.3 ICD-9-CM: 780.93  Unknown - Present Mixed connective tissue disease (HCC) (Chronic) ICD-10-CM: M35.1 ICD-9-CM: 710.8  5/10/2017 - Present History of vitamin D deficiency (Chronic) ICD-10-CM: Z86.39 
ICD-9-CM: V12.1  5/10/2017 - Present Urge urinary incontinence (Chronic) ICD-10-CM: N39.41 
ICD-9-CM: 788.31  5/10/2017 - Present Contusion of left elbow ICD-10-CM: H68.47FM ICD-9-CM: 923.11  5/4/2017 - Present Intravenous drug user ICD-10-CM: F19.90 ICD-9-CM: 305.90  5/2/2017 - Present Right Achilles tendinitis ICD-10-CM: M76.61 
ICD-9-CM: 726.71  5/2/2017 - Present Cellulitis and abscess of foot ICD-10-CM: L03.119, L02.619 ICD-9-CM: 682.7  4/13/2017 - Present Abnormal nuclear stress test ICD-10-CM: R94.39 
ICD-9-CM: 794.39  11/17/2016 - Present Overview Signed 11/17/2016 12:45 PM by Bladimir Macdonald MD  
  mild in ischemia-medically treated Acute exacerbation of chronic obstructive pulmonary disease (HCC) ICD-10-CM: J44.1 ICD-9-CM: 491.21  3/30/2016 - Present Sarcoidosis of lung ([facility] ICD-10-CM: D86.0 ICD-9-CM: 135, 517.8  3/30/2016 - Present Cocaine abuse ([facility] ICD-10-CM: F14.10 ICD-9-CM: 305.60  7/12/2015 - Present Chronic diastolic heart failure (HCC) (Chronic) ICD-10-CM: I50.32 
ICD-9-CM: 428.32  7/9/2015 - Present Overview Signed 5/23/2020  1:23 AM by Candy Rai DO Overview:  
Overview:  
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  
  jumped out of second story window Type 2 diabetes mellitus with diabetic neuropathy, with long-term current use of insulin (HCC) (Chronic) ICD-10-CM: E11.40, Z79.4 ICD-9-CM: 250.60, 357.2, V58.67  Unknown - Present Overview Signed 12/18/2019  2:13 PM by Heather Ayala MD  
  HbA1c (12/11/2019) = 7.1 Anxiety (Chronic) ICD-10-CM: F41.9 ICD-9-CM: 300.00  Unknown - Present Wears glasses ICD-10-CM: Z97.3 ICD-9-CM: V49.89  Unknown - Present History of hepatitis C ICD-10-CM: Z86.19 ICD-9-CM: V12.09  Unknown - Present Sickle cell trait (HCC) (Chronic) ICD-10-CM: D57.3 ICD-9-CM: 282.5  Unknown - Present Abnormally low high density lipoprotein (HDL) cholesterol with hypertriglyceridemia (Chronic) ICD-10-CM: E78.6, E78.1 ICD-9-CM: 272.5, 272.1  Unknown - Present Overview Addendum 5/10/2017  3:26 PM by Devin Capps MD  
  Lipid profile (11/6/2016) showed , , HDL 38, LDL 37 
  
  
   
 Bipolar 1 disorder (HCC) (Chronic) ICD-10-CM: F31.9 ICD-9-CM: 296.7  12/5/2012 - Present Generalized osteoarthritis of multiple sites (Chronic) ICD-10-CM: M15.9 ICD-9-CM: 715.09  Unknown - Present Diabetic neuropathy associated with type 2 diabetes mellitus (HCC) (Chronic) ICD-10-CM: E11.40 ICD-9-CM: 250.60, 357.2  Unknown - Present Venous insufficiency (Chronic) ICD-10-CM: R39.0 ICD-9-CM: 459.81  Unknown - Present Obesity, Class I, BMI 30-34.9 (Chronic) ICD-10-CM: E98.5 ICD-9-CM: 278.00  Unknown - Present Chronic back pain (Chronic) ICD-10-CM: M54.9, G89.29 ICD-9-CM: 724.5, 338.29  Unknown - Present RESOLVED: Elevated troponin ICD-10-CM: R79.89 ICD-9-CM: 790.6  5/21/2020 - 5/23/2020 RESOLVED: Cellulitis of arm, right ICD-10-CM: S19.408 ICD-9-CM: 682.3  5/4/2017 - 5/23/2020 RESOLVED: Olecranon bursitis of right elbow ICD-10-CM: M70.21 ICD-9-CM: 726.33  5/4/2017 - 5/23/2020 RESOLVED: Cellulitis of right forearm ICD-10-CM: S32.453 ICD-9-CM: 682.3  5/4/2017 - 5/23/2020 RESOLVED: Cellulitis and abscess of hand ICD-10-CM: L03.119, L02.519 ICD-9-CM: 682.4  4/13/2017 - 5/23/2020 RESOLVED: Abscess of right arm ICD-10-CM: L02.413 ICD-9-CM: 682.3  6/3/2013 - 5/23/2020 RESOLVED: Diastolic dysfunction without heart failure (Chronic) ICD-10-CM: Z52.51 ICD-9-CM: 429.9  Unknown - 5/23/2020 Overview Signed 4/23/2013 10:00 AM by Allen Conde Stable on diuretics RESOLVED: Hypertensive heart disease without heart failure (Chronic) ICD-10-CM: I11.9 ICD-9-CM: 402.90  Unknown - 5/23/2020 Overview Signed 4/23/2013 10:02 AM by Allen Conde Better controlled RESOLVED: Type II or unspecified type diabetes mellitus without mention of complication, not stated as uncontrolled ICD-10-CM: E11.9 ICD-9-CM: 250.00  Unknown - 5/31/2013 RESOLVED: CHF (congestive heart failure) (HCC) ICD-10-CM: I50.9 ICD-9-CM: 428.0  Unknown - 5/31/2013 RESOLVED: Depression ICD-10-CM: F32.9 ICD-9-CM: 747  Unknown - 5/23/2020 RESOLVED: Fatigue ICD-10-CM: R53.83 ICD-9-CM: 780.79  Unknown - 5/31/2013 RESOLVED: Tinnitus ICD-10-CM: 388.3 ICD-9-CM: 388. 3  Unknown - 5/31/2013 RESOLVED: SOB (shortness of breath) ICD-10-CM: R06.02 
ICD-9-CM: 786.05  Unknown - 5/31/2013 RESOLVED: Hiatal hernia ICD-10-CM: K44.9 ICD-9-CM: 553.3  Unknown - 5/31/2013 RESOLVED: Numbness and tingling ICD-10-CM: R20.0, R20.2 ICD-9-CM: 782.0  Unknown - 5/31/2013 Overview Signed 4/23/2013 10:21 AM by Magnus Varela H  
  arms and legs, related to diabetic neuropathy RESOLVED: Difficulty speaking ICD-10-CM: R47.9 ICD-9-CM: 784.59  Unknown - 5/31/2013 RESOLVED: Weakness generalized ICD-10-CM: R53.1 ICD-9-CM: 780.79  Unknown - 5/31/2013 RESOLVED: Asthma ICD-10-CM: S36.571 ICD-9-CM: 493.90  Unknown - 5/31/2013 RESOLVED: Headache(784.0) ICD-10-CM: R51 ICD-9-CM: 784.0  Unknown - 5/31/2013 RESOLVED: Lung disease ICD-10-CM: J98.4 ICD-9-CM: 518.89  Unknown - 5/31/2013 RESOLVED: Hoarseness of voice ICD-10-CM: R49.0 ICD-9-CM: 784.42  Unknown - 5/31/2013 RESOLVED: Swelling of body region ICD-10-CM: R60.9 ICD-9-CM: 443. 3  Unknown - 5/31/2013 Overview Signed 4/23/2013 10:22 AM by Chaparro Redrock H  
  legs and feet RESOLVED: STD (female) ICD-10-CM: A64 
ICD-9-CM: 099.9  Unknown - 5/31/2013 Overview Signed 4/23/2013 10:22 AM by Shankar CALLES  
  herpes RESOLVED: Difficulty walking ICD-10-CM: R26.2 ICD-9-CM: 719.7  Unknown - 5/31/2013 RESOLVED: Memory difficulty ICD-10-CM: R41.3 ICD-9-CM: 780.93  Unknown - 5/31/2013 RESOLVED: Falls frequently ICD-10-CM: R29.6 ICD-9-CM: V15.88  Unknown - 5/31/2013 RESOLVED: Emphysema ICD-10-CM: 043 ICD-9-CM: 181  Unknown - 5/31/2013 RESOLVED: Leg cramps ICD-10-CM: R25.2 ICD-9-CM: 729.82  11/24/2012 - 5/31/2013 RESOLVED: Constipation ICD-10-CM: K59.00 ICD-9-CM: 564.00  11/24/2012 - 5/31/2013 RESOLVED: Carbon monoxide exposure ICD-10-CM: Z77.29 ICD-9-CM: V87.39  11/24/2012 - 5/31/2013 RESOLVED: Dehydration ICD-10-CM: E86.0 ICD-9-CM: 276.51  11/24/2012 - 5/31/2013 RESOLVED: Osteoarthrosis, unspecified whether generalized or localized, unspecified site ICD-10-CM: M19.90 ICD-9-CM: 715.90  8/16/2011 - 5/31/2013 RESOLVED: Polyneuropathy in diabetes(357.2) ICD-10-CM: E11.42 
ICD-9-CM: 357.2  8/16/2011 - 5/31/2013 RESOLVED: Sarcoidosis ICD-10-CM: D86.9 ICD-9-CM: 135  Unknown - 5/31/2013 RESOLVED: Osteoarthritis of left knee ICD-10-CM: M17.10 ICD-9-CM: 715.36  Unknown - 5/31/2013 Reason for Admission 62 y.o. female with a past medical history of sarcoidosis on chronic steroids, COPD, chronic respiratory failure on 3 L nasal cannula baseline, continued tobacco abuse, pulmonary hypertension, paroxysmal atrial fibrillation on Eliquis, pulmonary hypertension, diabetes mellitus on insulin with neuropathy, chronic diastolic heart failure, GERD, gout, T6 compression fracture noted on 5/16/2020 CTA, hepatitis C status post treatment and a history of drug abuse. Patient was hospitalized February 2020 through March 2020 at [facility] for sigmoid perforation and septic shock, acute on chronic respiratory failure, acute diastolic congestive heart failure and an E. coli UTI. She had a laparotomy with colectomy and transverse colostomy. She was recently hospitalized for healthcare associated pneumonia with negative COVID-19 during that stay from 5/16 to 5/19/2020 at [facility]. On  5/20/2020 the patient developed back pain and chest pain with increased shortness of breath and lethargy. EMS was called and the patient was found to have a pulse ox of 85% on her baseline oxygen which improved 100% on a 15 L nonrebreather. She reported taking her normal dose of methadone that morning and presented to [facility] ED. 
  
In the ED her lethargy improved after Narcan. She became alert, but hypotensive with persistent hypoxia. Further history was obtained from the patient's daughter who lives with the patient and stated she had increased cough. Temperature 99, /120, heart rate 123, respiratory rate 24. Patient demonstrated wheezing throughout all lung fields and had bilateral lower extremity edema; also abdominal distention and tenderness on exam. Chest x-ray demonstrated diffuse interstitial opacities concerning for interstitial infiltrate or edema superimposed on chronic interstitial lung disease. CT of the abdomen pelvis had no acute findings of the abdomen or pelvis, developing subtle compression fractures versus degenerative Schmorl's nodes at L3 and L4, new groundglass lung process in the right middle lobe with features representative of COVID-19 pneumonia. Patient started to develop a prolonged QTC on a cardiac monitor emergency room and was given 2 g of magnesium. She received a fluid bolus of 30 cc/kg for hypertension and empiric antibiotics were initiated.   Patient received 60 mg of p.o. prednisone; patient was placed on droplet plus precautions and admitted to [facility] stepdown unit. Discharge Condition: Good PHYSICAL EXAM at discharge. Visit Vitals /73 Pulse 67 Temp 98.5 °F (36.9 °C) Resp 19 Ht 5' 2\" (1.575 m) Wt 73.9 kg (163 lb) SpO2 96% Breastfeeding No  
BMI 29.81 kg/m² General appearance - alert, well appearing, and in no distress Chest -clear air entry noted in bases, no wheezes Heart - S1 and S2 normal 
Abdomen - soft, nontender, nondistended, nabs. Colostomy stoma pink, stool in bag. Neurological - alert, oriented, normal speech, no focal findings noted while in bed Extremities - no pedal edema noted Skin: no rash Hospital Course: 1. Shortness of breath, multifactorial - COVID-19 test is negative 2. Acute on chronic diastolic CHF, improving 3. Non-STEMI - with positive troponin now down trending- patient is chest pain free. she received tx dose Lovenox SQ bid. Low risk stress test-medical management. Coreg added. 4.  Bronchitis with atelectasis, pneumonia ruled out after study 5. Chronic hypoxic respiratory failure on home oxygen - she has returned to baseline O2 requirement. 6.  COPD without exacerbation 7. Pulmonary hypertension 8. Sarcoidosis on chronic steroids. 9.  Polysubstance use hx - now on methadone. 10.  Paroxysmal atrial fibrillation, rate controlled - maintaining NSR- continue  amiodarone po, resume eliquis at discharge. Coreg added. 11.  Hypothyroidism 12. Diabetes mellitus with hyperglycemia 13. Sickle cell trait with anemia 14. Midline abdominal wound without infection. Recent acute Sigmoid Colon Perforation-S/p colectomy and transverse colostomy on 3/1/20 
15. Metabolic alkalosis. 16. Acute on Chronic Congestive heart failure-compensated now. Continue lasix po.   
17. mild oral and moderate pharyngeal dysphagia noted on MBS -  
 regular solids with HTL with home health SLP (ordered). 18. dvt prophylaxis was given in the form of lovenox subcut daily 19. Full code. Discharge to home with hh. Patient agrees, all questions answered to the best of my ability. Consults: Cardiology Dr. Paz Mccain Significant Diagnostic Studies: labs:  
Recent Results (from the past 24 hour(s)) GLUCOSE, POC Collection Time: 05/27/20  3:24 PM  
Result Value Ref Range Glucose (POC) 120 (H) 70 - 110 mg/dL GLUCOSE, POC Collection Time: 05/27/20  9:00 PM  
Result Value Ref Range Glucose (POC) 166 (H) 70 - 110 mg/dL PROTHROMBIN TIME + INR Collection Time: 05/28/20  1:30 AM  
Result Value Ref Range Prothrombin time 13.3 11.5 - 15.2 sec INR 1.0 0.8 - 1.2 PTT Collection Time: 05/28/20  1:30 AM  
Result Value Ref Range aPTT 39.6 (H) 23.0 - 36.4 SEC PHOSPHORUS Collection Time: 05/28/20  1:30 AM  
Result Value Ref Range Phosphorus 5.0 (H) 2.5 - 4.9 MG/DL  
GLUCOSE, POC Collection Time: 05/28/20  6:28 AM  
Result Value Ref Range Glucose (POC) 105 70 - 110 mg/dL EKG, 12 LEAD, SUBSEQUENT Collection Time: 05/28/20  7:07 AM  
Result Value Ref Range Ventricular Rate 65 BPM  
 Atrial Rate 65 BPM  
 P-R Interval 124 ms QRS Duration 86 ms  
 Q-T Interval 430 ms QTC Calculation (Bezet) 447 ms Calculated P Axis 35 degrees Calculated R Axis 10 degrees Calculated T Axis 51 degrees Diagnosis Normal sinus rhythm Normal ECG When compared with ECG of 27-MAY-2020 08:43, 
Questionable change in QRS axis GLUCOSE, POC Collection Time: 05/28/20 11:28 AM  
Result Value Ref Range Glucose (POC) 110 70 - 110 mg/dL Results Procedure Component Value Units Date/Time CULTURE, BLOOD [[id number]] Collected:  05/21/20 180 Order Status:  Canceled CULTURE, BLOOD [[id number]] Collected:  05/20/20 4582 Order Status:  Completed Specimen:  Blood Updated:  05/27/20 9553 Special Requests: NO SPECIAL REQUESTS Culture result: NO GROWTH 6 DAYS     
 CULTURE, BLOOD [[id number]] Collected:  05/20/20 2250 Order Status:  Completed Specimen:  Blood Updated:  05/27/20 0872 Special Requests: NO SPECIAL REQUESTS Culture result: NO GROWTH 6 DAYS     
 RESPIRATORY PANEL,PCR,NASOPHARYNGEAL [[id number]] Collected:  05/16/20 2045 Order Status:  Canceled Specimen:  NASOPHARYNGEAL SWAB CULTURE, URINE [[id number]]  (Abnormal)  (Susceptibility) Collected:  05/16/20 1837 Order Status:  Completed Specimen:  Cath Urine Updated:  05/19/20 5456 Special Requests: NO SPECIAL REQUESTS [place] Count --     
  >100,000 COLONIES/mL Culture result: ESCHERICHIA COLI Susceptibility Escherichia coli NEL Amikacin ($) Susceptible Ampicillin ($) Susceptible Ampicillin/sulbactam ($) Susceptible Cefazolin ($) Susceptible Cefepime ($$) Susceptible Cefoxitin Susceptible Ceftazidime ($) Susceptible Ceftriaxone ($) Susceptible Ciprofloxacin ($) Resistant Ext. Spec. Beta Lactamase Susceptible Gentamicin ($) Susceptible Levofloxacin ($) Resistant Meropenem ($$) Susceptible Nitrofurantoin Susceptible Piperacillin/Tazobac ($) Susceptible Tobramycin ($) Susceptible Trimeth/Sulfa Susceptible CULTURE, BLOOD [[id number]] Collected:  05/16/20 1140 Order Status:  Completed Specimen:  Blood Updated:  05/22/20 8701 Special Requests: NO SPECIAL REQUESTS Culture result: NO GROWTH 6 DAYS     
 RESPIRATORY PANEL,PCR,NASOPHARYNGEAL [[id number]] Collected:  05/16/20 1120 Order Status:  Canceled CULTURE, BLOOD [[id number]] Collected:  05/16/20 1100 Order Status:  Completed Specimen:  Blood Updated:  05/22/20 7579 Special Requests: NO SPECIAL REQUESTS Culture result: NO GROWTH 6 DAYS IMAGING 
XR Results (most recent): 
 Results from [facility] Encounter encounter on 05/20/20 XR SWALLOW [facility] VIDEO Narrative MODIFIED BARIUM SWALLOW. CLINICAL INDICATION/HISTORY: Dysphagia. Pneumonia. Concern for aspiration. Bedside exam with moderate oropharyngeal dysphagia and weak laryngeal elevation 
to palpation. Full MBS felt to be needed by the bedside exam. Assessment for 
aspiration. COMPARISON: None. TECHNIQUE: A live videoradiographic swallowing function study was performed in 
conjunction with the speech therapist.  The video is available in the department 
for review by speech pathology and ancillary staff. Fluoroscopic images were not made available in PACS for radiologist review and 
this report is strictly a procedural documentation by the physician assistant 
with input from speech pathology. It is not considered inclusive or exclusive 
of anatomic abnormalities and is not diagnostic beyond the specific 
considerations regarding swallowing function as it relates to airway protection 
while eating and drinking. Fluoroscopy time: 1 minute 24 seconds Fluoroscopic images: 0 Electronic capture cine loops: 13 FINDINGS: 
 
Patient swallowed multiple consistencies of barium mixtures including thin 
liquids (via cup and straw), nectar (via cup), honey, pudding, solids and barium 
tablet challenge. There was laryngeal penetration of thin liquids and nectar consistency as well 
as nectar consistency wash when swallowing a barium tablet. Other tested consistencies including honey, pudding and solids were swallowed 
without raphael penetration or aspiration. Premature spillage and vallecular residuals were seen with all tested 
consistencies. Impression IMPRESSION: 
 
1. Laryngeal penetration of thin liquids and nectar consistencies. 2. No raphael penetration or aspiration with other tested consistencies. Please see speech pathologist report for additional details and recommendations. CT Results (most recent): 
Results from [facility] Encounter encounter on 05/20/20 CT ABD PELV WO CONT Narrative CT ABDOMEN AND PELVIS WITHOUT ENHANCEMENT INDICATION: Acute tachycardia and hypertension. Abdominal distention and 
tenderness on exam. 
 
TECHNIQUE: Axial images obtained of the abdomen and pelvis without intravenous 
contrast. Coronal and sagittal reformatted images were obtained. All CT scans 
are performed using dose optimization techniques as appropriate to the performed 
exam including the following: Automated exposure control, adjustment of mA 
and/or kV according to patient size, and use of iterative reconstructive 
technique. COMPARISON: Ultrasound abdomen limited 5/18/2020, 5/16/2020 CTA chest 2/29/2020 CT chest, abdomen and pelvis. FINDINGS:  
Lung Base: Improved aeration of the left lung base atelectasis or consolidation, 
now mild. Stable mild peribronchial consolidation or atelectasis in the right 
lung base appearing stable to the prior but improved from 2/2020. New small 
sites of groundglass opacity in the right middle lobe on image 4-7, one of which 
is peripheral. Trace pleural fluid bilaterally. Liver: Unremarkable. Biliary: Unremarkable. Spleen: Medial splenic dome hypodensity measures 3.4 cm. Inferior splenic 
hypodensity measures 2.4 cm. No change. Pancreas: Unremarkable. Adrenal Glands: Unremarkable. Kidneys: Unremarkable. Bowel: Rectosigmoid junction surgical anastomosis. Colostomy. No evidence of 
obstruction. Mild to moderate right colonic stool. Normal appendix. Bladder/ Pelvic Organs: Unremarkable. Peritoneum/ Retroperitoneum: Unremarkable. Lymph Nodes: Unremarkable. Vessels: Unremarkable for age. Bones/Soft tissues: Scattered edema in the subcutaneous tissues . Thoracic 
fusion hardware. Chronic bilateral rib fracture deformities. Stable L2 mild 
compression fracture deformity. Minimal superior endplate fracture deformities or developing in the superior endplates of L3 and L4 towards the left, versus 
new degenerative Schmorl's nodes. 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 
heads. Impression IMPRESSION: 
 
1. New mild groundglass lung process in the right middle lobe. This has features 
that could be representative of COVID-19 pneumonia. The lung is not fully 
assessed on this abdomen study. Many other processes can have a similar 
appearance. 2. Other lung processes are improving over time. 3. No acute findings within the abdomen or pelvis. 4. Stable splenic lesions. 5. Developing subtle compression fractures versus degenerative Schmorl's nodes 
at L3 and L4. EKG Results Procedure 720 Value Units Date/Time EKG, 12 LEAD, SUBSEQUENT [[id number]] Collected:  05/28/20 8924 Order Status:  Completed Updated:  05/28/20 1495 Ventricular Rate 65 BPM   
  Atrial Rate 65 BPM   
  P-R Interval 124 ms QRS Duration 86 ms   
  Q-T Interval 430 ms QTC Calculation (Bezet) 447 ms Calculated P Axis 35 degrees Calculated R Axis 10 degrees Calculated T Axis 51 degrees Diagnosis --  
  Normal sinus rhythm Normal ECG When compared with ECG of 27-MAY-2020 08:43, 
Questionable change in QRS axis EKG, 12 LEAD, SUBSEQUENT [[id number]] Collected:  05/27/20 6358 Order Status:  Completed Updated:  05/27/20 1247 Ventricular Rate 65 BPM   
  Atrial Rate 65 BPM   
  P-R Interval 126 ms   
  QRS Duration 90 ms Q-T Interval 440 ms QTC Calculation (Bezet) 457 ms Calculated P Axis 59 degrees Calculated R Axis 90 degrees Calculated T Axis 59 degrees Diagnosis --  
  Normal sinus rhythm Rightward axis RSR' or QR pattern in V1 suggests right ventricular conduction delay Borderline ECG Confirmed by Bib Braun ([id number]) on 5/27/2020 12:47:01 PM 
  
 EKG, 12 LEAD, SUBSEQUENT [[id number]] Collected:  05/27/20 9922 Order Status:  Completed Updated:  05/27/20 1245 Ventricular Rate 69 BPM   
  Atrial Rate 69 BPM   
  P-R Interval 116 ms   
  QRS Duration 86 ms   
  Q-T Interval 426 ms   
  QTC Calculation (Bezet) 456 ms Calculated P Axis 47 degrees Calculated R Axis 34 degrees Calculated T Axis 70 degrees Diagnosis --  
  Normal sinus rhythm Possible Left atrial enlargement Borderline ECG When compared with ECG of 21-MAY-2020 04:15, 
Criteria for Septal infarct are no longer present Confirmed by Renetta Feliciano ([id number]) on 5/27/2020 12:44:17 PM 
  
 EKG, 12 LEAD, SUBSEQUENT [[id number]] Order Status:  Canceled EKG, 12 LEAD, SUBSEQUENT [[id number]] Order Status:  Canceled EKG, 12 LEAD, SUBSEQUENT [[id number]] Order Status:  Canceled EKG, 12 LEAD, SUBSEQUENT [[id number]] Order Status:  Canceled EKG, 12 LEAD, SUBSEQUENT [[id number]] Order Status:  Canceled EKG, 12 LEAD, SUBSEQUENT [[id number]] Order Status:  Canceled EKG, 12 LEAD, REPEAT [[id number]] Collected:  05/21/20 0415 Order Status:  Completed Updated:  05/21/20 1036 Ventricular Rate 66 BPM   
  Atrial Rate 66 BPM   
  P-R Interval 128 ms QRS Duration 80 ms   
  Q-T Interval 482 ms QTC Calculation (Bezet) 505 ms Calculated P Axis 62 degrees Calculated R Axis 15 degrees Calculated T Axis 43 degrees Diagnosis --  
  Normal sinus rhythm Left atrial enlargement Septal infarct (cited on or before 20-MAY-2020) Abnormal ECG When compared with ECG of 20-MAY-2020 18:42, Nonspecific T wave abnormality now evident in Anterior leads Confirmed by Rose Bocanegra ([id number]) on 5/21/2020 10:36:19 AM 
  
 EKG, 12 LEAD, INITIAL [[id number]] Collected:  05/20/20 1842 Order Status:  Completed Updated:  05/21/20 1032 Ventricular Rate 96 BPM   
  Atrial Rate 96 BPM   
  P-R Interval 118 ms QRS Duration 80 ms   
  Q-T Interval 376 ms QTC Calculation (Bezet) 475 ms Calculated P Axis 55 degrees Calculated R Axis 36 degrees Calculated T Axis 59 degrees Diagnosis --  
  Normal sinus rhythm Left atrial enlargement Septal infarct , age undetermined Abnormal ECG When compared with ECG of 16-MAY-2020 11:38, 
ST less depressed in Anterior leads Confirmed by Manual Porteous ([id number]) on 5/21/2020 10:32:37 AM 
  
 EKG, 12 LEAD, INITIAL [[id number]] Order Status:  Canceled Nuclear Stress Test 5/27/20 · Gated SPECT: Left ventricular function post-stress was normal. Calculated ejection fraction is 67%. There is no evidence of transient ischemic dilation (TID). The TID ratio is 1.02. 
· Baseline ECG: Normal sinus rhythm, Nonspecific T wave flattening in lateral leads. Mandi Graven · Negative stress test. 
· Left ventricular perfusion is normal. 
· Negative myocardial perfusion imaging. Myocardial perfusion imaging supports a low risk stress test. 
 
ECHO ADULT LIMITED W DOPPLER & COLOR 5/24/20 · Normal cavity size and systolic function (ejection fraction normal). Mild concentric hypertrophy. Estimated left ventricular ejection fraction is 55 - 60%. Visually measured ejection fraction. No regional wall motion abnormality noted. · Dilated left atrium. · Mildly dilated right ventricle. · Dilated right atrium. · Mitral annular calcification. Trace mitral valve regurgitation is present. · Moderate tricuspid valve regurgitation is present. · Pulmonary hypertension. Pulmonary arterial systolic pressure is 53 mmHg. · Moderately elevated central venous pressure (10-15 mmHg); IVC diameter is larger than 21 mm and collapses more than 50% with respiration Discharge Medications:    
Current Discharge Medication List  
  
START taking these medications Details  
carvediloL (COREG) 3.125 mg tablet Take 1 Tab by mouth two (2) times daily (with meals). Qty: 60 Tab, Refills: 2 CONTINUE these medications which have NOT CHANGED Details multivitamin,therapeutic (THERA-TABS PO) Take 1 Tab by mouth daily. levothyroxine (Synthroid) 100 mcg tablet Take 1 Tab by mouth daily. sertraline (ZOLOFT) 100 mg tablet Take 1 Tab by mouth daily. allopurinoL (ZYLOPRIM) 100 mg tablet Take 1 Tab by mouth daily. ARIPiprazole (ABILIFY) 5 mg tablet Take 20 mg by mouth daily. amLODIPine (NORVASC) 5 mg tablet Take 5 mg by mouth daily. oxybutynin (DITROPAN) 5 mg tablet Take 5 mg by mouth two (2) times a day. methocarbamoL (ROBAXIN) 500 mg tablet Take 500 mg by mouth two (2) times a day. umeclidinium-vilanteroL (Anoro Ellipta) 62.5-25 mcg/actuation inhaler INHALE 1 PUFF DAILY Qty: 1 Inhaler, Refills: 5  
  
albuterol (ProAir HFA) 90 mcg/actuation inhaler INHALE 2 PUFFS EVERY 4 HOURS AS NEEDED FOR WHEEZING Qty: 8.5 Inhaler, Refills: 4  
  
furosemide (LASIX) 20 mg tablet Take 1 Tab by mouth daily. Qty: 90 Tab, Refills: 1  
  
apixaban (ELIQUIS) 5 mg tablet Take 1 Tab by mouth two (2) times a day. Qty: 60 Tab, Refills: 3  
  
amiodarone (CORDARONE) 200 mg tablet Take 1 Tab by mouth daily. Qty: 60 Tab, Refills: 3  
  
spironolactone (ALDACTONE) 25 mg tablet Take 1 Tab by mouth daily. Qty: 30 Tab, Refills: 3  
  
predniSONE (DELTASONE) 10 mg tablet Take 30 mg by mouth daily (with breakfast). Qty: 90 Tab, Refills: 1  
  
white petrolatum (Protective Ointment) topical ointment Apply 1 Applicator to affected area two (2) times a week. Comments: apply thin layer to left buttock with dressing change and as needed for soilage  
  
insulin glargine (LANTUS) 100 unit/mL injection 10 Units by SubCUTAneous route daily. Qty: 1 Vial, Refills: 2  
  
pantoprazole (PROTONIX) 40 mg tablet Take 1 Tab by mouth Before breakfast and dinner. Qty: 60 Tab, Refills: 0  
  
magnesium oxide (MAG-OX) 400 mg tablet Take 1 Tab by mouth daily. Qty: 30 Tab, Refills: 1  
  
roflumilast (DALIRESP) 250 mcg tab Take 250 mcg by mouth daily. Qty: 30 Tab, Refills: 0  
  
busPIRone (BUSPAR) 15 mg tablet Take 15 mg by mouth two (2) times a day. Indications: repeated episodes of anxiety Qty: 60 Tab, Refills: 2 OXYGEN-AIR DELIVERY SYSTEMS 3 L/min by Nasal route continuous. [employer] O2  
  
albuterol-ipratropium (DUO-NEB) 2.5 mg-0.5 mg/3 ml nebu 3 mL by Nebulization route every six (6) hours as needed for Wheezing. Qty: 30 Nebule, Refills: 1 cholecalciferol (VITAMIN D3) (1000 Units /25 mcg) tablet Take 1,000 Units by mouth daily. aspirin 81 mg chewable tablet Take 1 Tab by mouth daily (with breakfast). Indications: stroke prevention 
Qty: 30 Tab, Refills: 0  
  
ascorbic acid, vitamin C, (VITAMIN C) 250 mg tablet Take 1 Tab by mouth daily (with breakfast). Qty: 30 Tab, Refills: 0 Associated Diagnoses: Acute blood loss as cause of postoperative anemia  
  
calcium citrate 200 mg (950 mg) tablet Take 1 Tab by mouth two (2) times a day. Indications: post-menopausal osteoporosis prevention 
Qty: 60 Tab, Refills: 0 Associated Diagnoses: Status post laminectomy with spinal fusion  
  
ferrous sulfate 325 mg (65 mg iron) tablet Take 1 Tab by mouth daily (with breakfast). Qty: 30 Tab, Refills: 0 Associated Diagnoses: Acute blood loss as cause of postoperative anemia  
  
acetaminophen (TYLENOL) 325 mg tablet Take 2 Tabs by mouth every four (4) hours as needed for Pain. Indications: pain 
Qty: 60 Tab, Refills: 0 Associated Diagnoses: Status post laminectomy with spinal fusion  
  
brief disposable (ADULT) misc by Does Not Apply route. Qty: 1 Package, Refills: 0 Associated Diagnoses: Urge urinary incontinence  
  
polyethylene glycol (MIRALAX) 17 gram/dose powder Take 17 g by mouth daily. 1 tablespoon with 8 oz of water daily 
Qty: 289 g, Refills: 0 BD INSULIN SYRINGE ULTRA-FINE 1 mL 31 gauge x 5/16 syrg   
  
rosuvastatin (CRESTOR) 5 mg tablet TAKE ONE TABLET NIGHTLY Qty: 90 Tab, Refills: 3 insulin lispro (HUMALOG) 100 unit/mL injection To be given 15 min before meals: < 150 - None, 151-200 - 2 u, 201-250 - 4 u, 251-300 - 6 u, 301-350 - 8 u, 351-400 - 10 u, >400 - 12 u Qty: 1 Vial, Refills: 0 Associated Diagnoses: Type 2 diabetes mellitus with stage 3 chronic kidney disease, with long-term current use of insulin [facility] STOP taking these medications  
  
 doxycycline (VIBRAMYCIN) 100 mg capsule Comments:  
Reason for Stopping:   
   
  
 
 
Activity: PT/OT per [address] Diet: Cardiac Diet Wound Care: None needed Follow-up: 1. Dr Sarath Diego 1 week virtual visit 2. Dr. Geo Lynne 5 days. Minutes spent on discharge: greater than 30

## 2020-05-28 NOTE — PROGRESS NOTES
Met with pt and pt gave verbal consent to use [facility] for home health. [facility] does not accept pt's insurance. Pt agreeable to [facility]. Home health orders sent to [facility] and pt has been accepted. Pt stated she will need Medicaid transport at discharge. BRIANA ThompsonN RN Care Management Pager: [phone]

## 2020-05-28 NOTE — PROGRESS NOTES
5/28/2020 RE: Kale Peñaaidan Ernestine To Whom it May Concern: This is to certify that [address] tested negative for SARS-CoV-2 on 5/16/20 and 5/20/20. Please feel free to contact my office if you have any questions or concerns. Sincerely, Natalia Boxer, MD

## 2020-05-28 NOTE — PROGRESS NOTES
[facility] ANTIMICROBIAL STEWARDSHIP TEAM 
PRESCRIBER COMMUNICATION FORM We have briefly reviewed the antimicrobials  currently prescribed for your patient along with  the clinical indications and would like to Make the following recommendations: 
 
DISCONTINUE ANTIBIOTICS   cefpodoxime Rationale:    (  )  No evidence of bacterial infection (  )  Microbiology report does not support use (  )  Narrowing broad-spectrum empiric coverage (  )  Therapeutic duplication: overlapping antimicrobial spectrum (  )  Clinical situation dictates change (x)  Prolonged duration of therapy not needed (  )  Allergy/toxicity/drug interaction present (  ) More clinically/cost effective therapy available ADD ANTIBIOTICS Rationale:        (  ) Microbiology report supports use (  ) Expanded coverage needed: gm positive /negative / anaerobic /                               atypical 
  ( ) More clinicaly/cost effective therapy than current regimen ( ) Needed for synergy ( ) Double coverage needed ( ) Less toxic therapy ( ) Antifungal therapy needed ADDITIONAL SUGGESTIONS 
  ( ) continue current therapy with the following change ( ) additional laboratory testing ( ) consider infectious disease consultation ( ) consider outpatient IV therapy ( ) other COMMENTS: per records \"bronchitis with atelectasis, pneumonia ruled out\". Please discontinue antibiotics if no clinical indication for prolonged therapy. thanks This communication is not to be considered a consultation. You are under no obligation to follow these suggestions. A physician-patient relationship has not been established between the physician Completing this form and your patient. If a thorough analysis of the case is desired, please request an ID consultation.

## 2020-05-28 NOTE — PROGRESS NOTES
Interdisciplinary Round Note Patient Information: Patient Information: Elkin [address] Reason for Admission: HCAP (healthcare-associated pneumonia) [J18.9] Suspected COVID-19 virus infection [Z20.828] Attending Provider:   Douglas Doyle MD 
 Past Medical History:  
Past Medical History:  
Diagnosis Date  Abnormally low high density lipoprotein (HDL) cholesterol with hypertriglyceridemia Lipid profile (11/6/2016) showed , , HDL 38, LDL 37  Acute blood loss as cause of postoperative anemia 12/12/2019  Acute on chronic diastolic (congestive) heart failure ([address] 5/21/2020  Anxiety  Bipolar affective disorder ([address] 12/5/2012  Cellulitis of right forearm 05/04/2017  Chronic back pain  Chronic obstructive pulmonary disease (COPD) ([address] SOB, on [place] O2 Recent admission with psychosis  Chronic respiratory failure with hypoxia ([address] On home oxygen inhalation at 3 LPM via NC  
 Contusion of left elbow 5/4/2017  Depression  Diabetic neuropathy associated with type 2 diabetes mellitus ([address]  Diastolic dysfunction without heart failure Stable on diuretics  Falls frequently  Gastroesophageal reflux disease with hiatal hernia  Generalized osteoarthritis of multiple sites  Gout On Allopurinol  History of acute renal failure 5/31/2013  History of back injury   
 jumped out of second story window  History of fracture due to fall 12/11/2019  
 History of hepatitis C   
 treated  History of penicillin allergy  History of vitamin D deficiency 5/10/2017  Hypertensive heart disease without heart failure Better controlled  Hyperuricemia 5/26/2017  Hypothyroidism  Hypoxemia requiring supplemental oxygen 12/29/2014  Intravenous drug user 5/2/2017  Long term current use of aspirin  Memory difficulty  Menopause  Mixed connective tissue disease ([facility] 5/10/2017  Obesity, Class I, BMI 30-34.9  Olecranon bursitis of right elbow 5/4/2017  Opioid dependence ([facility] On Methadone  Recurrent genital herpes 5/31/2013  Right Achilles tendinitis 5/2/2017  Sarcoidosis  Sickle cell trait ([facility]  Tobacco use disorder  Type 2 diabetes mellitus with diabetic neuropathy, with long-term current use of insulin ([facility] HbA1c (12/11/2019) = 7.1  Urge urinary incontinence 5/10/2017  Venous insufficiency  Wears glasses Hospital day: 7 Estimated discharge date: 5/29/20 RRAT Score: High Risk 52 Total Score 3 Has Seen PCP in Last 6 Months (Yes=3, No=0) 3 Patient Length of Stay (>5 days = 3) 11 IP Visits Last 12 Months (1-3=4, 4=9, >4=11) 4 Pt. Coverage (Medicare=5 , Medicaid, or Self-Pay=4) 26 Charlson Comorbidity Score (Age + Comorbid Conditions) Criteria that do not apply:  
 . Living with Significant Other. Assisted Living. LTAC. SNF. or  
Rehab Goals for Today: Barium swallow VITAL SIGNS Vitals:  
 05/28/20 5680 05/28/20 0448 05/28/20 9702 05/28/20 1216 BP: 99/55 114/69 121/69 135/73 Pulse: 68 68 69 67 Resp: 19 19 20 19 Temp: 98.4 °F (36.9 °C) 98.3 °F (36.8 °C) 99 °F (37.2 °C) 98.5 °F (36.9 °C) SpO2: 93% 90% 98% 96% Weight:      
Height:      
LMP: 11/25/2012 Lines, Drains, & Airways Peripheral IV 05/21/20 Left;Upper Arm-Site Assessment: Clean, dry, & intact [REMOVED] Peripheral IV 05/21/20 Right Wrist-Site Assessment: Clean, dry, & intact [REMOVED] Peripheral IV 05/20/20 Right; Inner Basilic-Site Assessment: Clean, dry, & intact External Female Catheter 05/22/20-Site Assessment: Clean, dry, & intact VTE Prophylaxis Sequential/Intermittent Compression Device: Bilateral 
     Graduated Compression Stockings: Bilateral 
     Patient Refused VTE Prophylaxis: Yes Intake and Output: 05/26 1901 - 05/28 0700 In: -  
Out: 2000 [[id number]] No intake/output data recorded. Current Diet: DIET NUTRITIONAL SUPPLEMENTS Breakfast, Lunch; NAILA DRINK MIX 
DIET DIABETIC WITH OPTIONS Consistent Carb 1800kcal; Regular; 3-4 GM (EUNICE); 3 Honey/3 Moderately Thick Abdominal  
Last Bowel Movement Date: 05/27/20 Stool Appearance: Soft Abdominal Assessment: Ostomy (comment) Appetite: Good Bowel Sounds: Active Recent Glucose Results:  
Lab Results Component Value Date/Time [facility] 110 05/28/2020 11:28 AM  
 GLUCPOC 105 05/28/2020 06:28 AM  
 GLUCPOC 166 (H) 05/27/2020 09:00 PM  
 
 
  
IV Antibiotics? No  
      
Activity Level: Activity Level: Bed Rest 
Needs assistance with ADLs: no 
PT Consult Status: NO Current Immunizations: 
Immunization History Administered Date(s) Administered  Influenza Vaccine 12/29/2014, 10/10/2015, 11/11/2016, 10/02/2017, 10/12/2017  Influenza Vaccine (Quad) PF 01/19/2020  Influenza Vaccine Split 10/30/2012  Pneumococcal Polysaccharide (PPSV-23) 07/10/2015 Recommendations:  
Discharge Disposition: Home COVID status: no Will the patient require COVID testing prior to discharge for placement?: NO Medication Reconciliation Completed: YES Cardiac/Pulmonary Rehab Ordered:  NO 
 
Needs for Discharge:  Recommendations from IDR team:  
  
 
Natalia Alpers, BSN RN Care Management Pager: [phone]

## 2020-05-28 NOTE — DISCHARGE INSTRUCTIONS
Anesthesia Evaluation     NPO Solid Status: > 8 hours  NPO Liquid Status: > 8 hours           Airway   Mallampati: II  TM distance: >3 FB  Neck ROM: full  No difficulty expected  Dental - normal exam     Pulmonary    (+) decreased breath sounds,   (-) not a smoker  Cardiovascular     ECG reviewed    (+) hypertension, dysrhythmias Atrial Fib,   (-) murmur, cardiac stents, CABG      Neuro/Psych  (-) seizures, TIA, CVA  GI/Hepatic/Renal/Endo    (+)   diabetes mellitus,   (-) liver disease, no renal disease    Musculoskeletal     Abdominal    Substance History      OB/GYN          Other            Phys Exam Other: Sub Q air                Anesthesia Plan    ASA 3     MAC   (MAC vs GA)  intravenous induction     Plan discussed with CRNA.       Patient armband removed and shredded  MyChart Activation    Thank you for requesting access to m2fx. Please follow the instructions below to securely access and download your online medical record. m2fx allows you to send messages to your doctor, view your test results, renew your prescriptions, schedule appointments, and more. How Do I Sign Up? 1. In your internet browser, go to [email or web address]  2. Click on the First Time User? Click Here link in the Sign In box. You will be redirect to the New Member Sign Up page. 3. Enter your m2fx Access Code exactly as it appears below. You will not need to use this code after youve completed the sign-up process. If you do not sign up before the expiration date, you must request a new code. m2fx Access Code: [email or web address]  Expires: 2020 11:52 AM (This is the date your m2fx access code will )    4. Enter the last four digits of your Social Security Number (xxxx) and Date of Birth (mm/dd/yyyy) as indicated and click Submit. You will be taken to the next sign-up page. 5. Create a m2fx ID. This will be your m2fx login ID and cannot be changed, so think of one that is secure and easy to remember. 6. Create a m2fx password. You can change your password at any time. 7. Enter your Password Reset Question and Answer. This can be used at a later time if you forget your password. 8. Enter your e-mail address. You will receive e-mail notification when new information is available in [address]. 9. Click Sign Up. You can now view and download portions of your medical record. 10. Click the Download Summary menu link to download a portable copy of your medical information. Additional Information    If you have questions, please visit the Frequently Asked Questions section of the [email or web address] website at [email or web address] Remember, [email or web address] is NOT to be used for urgent needs.  For medical emergencies, dial 911.        DISCHARGE SUMMARY from Nurse    PATIENT INSTRUCTIONS:    What to do at Home:  Recommended activity: Activity as tolerated, activity with homehealth    If you experience any of the following symptoms chest pain, fever, chills, shortness of breath, elevated heart rate, please follow up with nearest emergency room. *  Please give a list of your current medications to your Primary Care Provider. *  Please update this list whenever your medications are discontinued, doses are      changed, or new medications (including over-the-counter products) are added. *  Please carry medication information at all times in case of emergency situations. These are general instructions for a healthy lifestyle:    No smoking/ No tobacco products/ Avoid exposure to second hand smoke  Surgeon General's Warning:  Quitting smoking now greatly reduces serious risk to your health. Obesity, smoking, and sedentary lifestyle greatly increases your risk for illness    A healthy diet, regular physical exercise & weight monitoring are important for maintaining a healthy lifestyle    You may be retaining fluid if you have a history of heart failure or if you experience any of the following symptoms:  Weight gain of 3 pounds or more overnight or 5 pounds in a week, increased swelling in our hands or feet or shortness of breath while lying flat in bed. Please call your doctor as soon as you notice any of these symptoms; do not wait until your next office visit. The discharge information has been reviewed with the patient. The patient verbalized understanding. Discharge medications reviewed with the patient and appropriate educational materials and side effects teaching were provided.   ___________________________________________________________________________________________________________________________________

## 2020-05-28 NOTE — PROGRESS NOTES
Problem: Dysphagia (Adult) Goal: *Acute Goals and Plan of Care (Insert Text) Description: Patient will: 1. Tolerate PO trials with 0 s/s overt distress in 4/5 trials 2. Utilize compensatory swallow strategies/maneuvers (decrease bite/sip, size/rate, alt. liq/sol) with min cues in 4/5 trials 3. Perform oral-motor/laryngeal exercises to increase oropharyngeal swallow function with min cues 4. Complete an objective swallow study (i.e., MBSS) to assess swallow integrity, r/o aspiration, and determine of safest LRD, min A-met 5/28/20 Rec:    
Reg solid with honey thick liquids Aspiration precautions HOB >45 during po intake, remain >30 for 30-45 minutes after po Small bites/sips; alternate liquid/solid with slow feeding rate Oral care TID Meds in [facility] SLP to address deficits Outcome: Progressing Towards Goal 
 
SPEECH PATHOLOGY MODIFIED BARIUM SWALLOW STUDY & TREATMENT Patient: Alek Harper (65 y.o. female) Date: 5/28/2020 Primary Diagnosis: HCAP (healthcare-associated pneumonia) [J18.9] Suspected COVID-19 virus infection [Z20.828] Precautions:  Fall, Skin, Aspiration ASSESSMENT : 
Based on the objective data described below, the patient presents with mild oral and moderate pharyngeal dysphagia c/b silent laryngeal penetration during the swallow with thin liquid, NTL and with NTL when used as a wash to clear 13 mm Ba pill from oral cavity. Pt unable to clear potential aspirate despite cues and comp strategies were ineffective at improving airway compromise. Pt tolerating HTL, pudding and regular solids with positive airway protection. Deficits include decreased bolus formation/control with premature spillage, decreased laryngeal closure/sensation, slowed epiglottic inversion and overall decreased pharyngeal motility/sensation. Recommend change diet to regular solids with HTL with home health SLP to address deficits.  
 
TREATMENT : 
 Treatment provided post diagnostic testing including oropharyngeal anatomy/physiology, MBS results, diet recommendations and compensatory strategies/positioning. Pt able to verbalize understanding; however, question carryover. Will continue to follow. Patient will benefit from skilled intervention to address the above impairments. Patient's rehabilitation potential is considered to be Fair Factors which may influence rehabilitation potential include:  
[]              None noted [x]              Mental ability/status [x]              Medical condition []              Home/family situation and support systems []              Safety awareness []              Pain tolerance/management 
[]              Other: PLAN : 
Recommendations and Planned Interventions: As above Frequency/Duration: Patient will be followed by speech-language pathology 1-2 times per day/4-7 days per week to address goals. Discharge Recommendations: Home Health SUBJECTIVE:  
Patient stated I'm going home today. OBJECTIVE:  
 
Past Medical History:  
Diagnosis Date Abnormally low high density lipoprotein (HDL) cholesterol with hypertriglyceridemia Lipid profile (11/6/2016) showed , , HDL 38, LDL 37 Acute blood loss as cause of postoperative anemia 12/12/2019 Acute on chronic diastolic (congestive) heart failure ([address] 5/21/2020 Anxiety Bipolar affective disorder ([address] 12/5/2012 Cellulitis of right forearm 05/04/2017 Chronic back pain Chronic obstructive pulmonary disease (COPD) ([address] SOB, on [place] O2 Recent admission with psychosis Chronic respiratory failure with hypoxia (HCC) On home oxygen inhalation at 3 LPM via NC Contusion of left elbow 5/4/2017 Depression Diabetic neuropathy associated with type 2 diabetes mellitus ([address] Diastolic dysfunction without heart failure Stable on diuretics Falls frequently Gastroesophageal reflux disease with hiatal hernia Generalized osteoarthritis of multiple sites Gout On Allopurinol History of acute renal failure 2013 History of back injury   
 jumped out of second story window History of fracture due to fall 2019 History of hepatitis C   
 treated History of penicillin allergy History of vitamin D deficiency 5/10/2017 Hypertensive heart disease without heart failure Better controlled Hyperuricemia 2017 Hypothyroidism Hypoxemia requiring supplemental oxygen 2014 Intravenous drug user 2017 Long term current use of aspirin Memory difficulty Menopause Mixed connective tissue disease ([address] 5/10/2017 Obesity, Class I, BMI 30-34.9 Olecranon bursitis of right elbow 2017 Opioid dependence ([facility] On Methadone Recurrent genital herpes 2013 Right Achilles tendinitis 2017 Sarcoidosis Sickle cell trait ([facility] Tobacco use disorder Type 2 diabetes mellitus with diabetic neuropathy, with long-term current use of insulin ([facility] HbA1c (2019) = 7.1 Urge urinary incontinence 5/10/2017 Venous insufficiency Wears glasses Past Surgical History:  
Procedure Laterality Date [address] HX  SECTION    
 HX COLOSTOMY  2020 Colectomy with transverse colostomy HX CYST REMOVAL Left  Left foot HX OTHER SURGICAL Left 2017 S/P incision and drainage of the abscess of the left hand and the left foot (2017 - Dr. Benson Galo. Jc Pete) HX THORACIC LAMINECTOMY  2019 S/P T10, T9, T8 laminectomy; T7, T8, T9, T10, T11, T12 posterior thoracic fusion; segmental spinal instrumentation; K2M [place] type, T7-T8, T11-T12 (2019 - Dr. Jigar Rose) HX TUBAL LIGATION Prior Level of Function/Home Situation: 
Home Situation Home Environment: Private residence # Steps to Enter: 4 One/Two Story Residence: One story Living Alone: No 
Support Systems: Family member(s), Child(chio) Patient Expects to be Discharged to[de-identified] Private residence Current DME Used/Available at Home: Wheelchair Diet prior to admission: Regular/thin Current Diet:  Regular/thin; recommend change to regular/HTL Radiologist:   
Film Views: Lateral;Fluoro Patient Position: 90 in chair Trial 1: Trial 2:  
Consistency Presented: Thin liquid; Nectar thick liquid(13 mm Ba pill with NTL wash ) Consistency Presented: Honey thick liquid;Pudding; Solid How Presented: Self-fed/presented;Cup/sip;Spoon;Straw;Successive swallows How Presented: Self-fed/presented;Cup/sip; Successive swallows Bolus Acceptance: No impairment Bolus Acceptance: No impairment Bolus Formation/Control: Impaired: Premature spillage;Mastication;Delayed;Poor;Posterior Bolus Formation/Control: Impaired: Premature spillage Propulsion: No impairment Oral Residue: None Oral Residue: None Initiation of Swallow: No impairment Timing: No impairment Penetration: During swallow; To laryngeal vestibule;From initial swallow;From residual Penetration: None Aspiration/Timing: No evidence of aspiration Aspiration/Timing: No evidence of aspiration Pharyngeal Clearance: Vallecular residue;Pyriform residue ; Less than 10% Pharyngeal Clearance: Less than 10%; Vallecular residue Attempted Modifications: Small sips and bites;Effortful swallow; Chin tuck(Small single sip ) Attempted Modifications: Small sips and bites; Alternate liquids/solids Effective Modifications: None Effective Modifications: Alternate liquids/solids Cues for Modifications: Moderate Cues for Modifications: Moderate Decreased Tongue Base Retraction?: Yes Laryngeal Elevation: Incomplete laryngeal closure; Inadequate epiglottic inversion; Reduced excursion with laryngeal vestibule gap Aspiration/Penetration Score: 3 (Penetration/Visible residue-Contrast remains above the folds/cords, but is not cleared) Pharyngeal Symmetry: Not assessed Pharyngeal-Esophageal Segment: No impairment Pharyngeal Dysfunction: Decreased tongue base retraction;Decreased strength;Decreased elevation/closure Oral Phase Severity: Mild Pharyngeal Phase Severity: Moderate 8-point Penetration-Aspiration Scale: Score 3 PAIN: 
Pt reports 0/10 pain or discomfort prior to MBS. Pt reports 0/10 pain or discomfort post MBS. COMMUNICATION/EDUCATION:  
[x]  Patient educated regarding MBS results and diet recommendations. []  Patient/family have participated as able in goal setting and plan of care. [x]  Patient/family agree to work toward stated goals and plan of care. []  Patient understands intent and goals of therapy, but is neutral about his/her participation. []  Patient is unable to participate in goal setting and plan of care. Thank you for this referral, Lauren Pitt M.S., CCC-SLP Speech-Language Pathologist

## 2020-05-28 NOTE — PROGRESS NOTES
Physical therapy attempted at this time. Patient receiving meds from nurse. Physical therapy asked patient if she is agreeable to PT. Patient stated, \"I don't want try to stand this minute, but I don't know how I'll feel the next minute. \" Physical therapy will re-attempt visit later if patient is agreeable.   
ROSE CaiT

## 2020-05-28 NOTE — PROGRESS NOTES
Discharge order noted for today. Pt has been accepted to [address] [place], [place] [address]e agency. Met with patient  and are agreeable to the transition plan today. Transport has been arranged through [facility] transport. Patient's discharge summary and home health  orders have been forwarded to [facility] home health  agency. Updated bedside RN, Denae,  to the transition plan. Discharge information has been documented on the AVS.  
 
 
Pt confirmed address she is going as [address] She stated she is calling her daughter to inform her she is coming home. Ekaterina of Case Management is setting up transport. Please see her notes BRIANA ArandaN RN Care Management Pager: [phone]

## 2020-05-28 NOTE — PROGRESS NOTES
conducted a Follow up consultation and Spiritual Assessment for Alber Gilman, who is a 62 y.o.,female. The  provided the following Interventions: 
Continued the relationship of care and support. Listened empathically. Offered prayer and assurance of continued prayer on patients behalf. Chart reviewed. The following outcomes were achieved: 
Patient expressed gratitude for pastoral care visit. Assessment: 
There are no further spiritual or Nondenominational issues which require Spiritual Care Services interventions at this time. Plan: 
Chaplains will continue to follow and will provide pastoral care on an as needed/requested basis.  recommends bedside caregivers page  on duty if patient shows signs of acute spiritual or emotional distress.  [facility] Care Department [phone]

## 2020-05-28 NOTE — PROGRESS NOTES
Transportation arrangements per Meryl ROSA) Called [facility] at [phone]; spoke to Miriam Vaughan confirmation # [id number] Scheduled a stretcher transport to patient's home:   
[address] [place] [address] O2 = 3L/min, bedbound. .. Ramona Metzger Will contact the nurse's station upon arrival. Allow 3 hours for pickkup (18:50)

## 2020-05-29 NOTE — PROGRESS NOTES
Patient contacted regarding recent discharge and COVID-19 risk. Spoke with Pt. Daughter Ms. Love Sinha. PHI verified and Ms. Krissy Veliz is listed. Ms. Krissy Veliz reported that Pt. Is doing well and requesting for CTN to call Pt. At her Phone number. CTN Attempt to contact patient via telephone on 5/29/20 for  follow up. Unable to reach. Left message on voicemail with office contact information. No Patient medical information left on message.

## 2020-06-16 NOTE — PROGRESS NOTES
Patient resolved from Transition of Care episode on 6/16/20    Patient/family has been provided the following resources and education related to COVID-19:                         Signs, symptoms and red flags related to COVID-19            CDC exposure and quarantine guidelines            Conduit exposure contact - [phone]            Contact for their local Department of Health               Patient reported that she is doing well. Pt. Denied CP and shortness of breath at this time. Patient currently reports that the following symptoms have improved:  no new symptoms and no worsening symptoms. No further outreach scheduled with this CTN. Episode of Care resolved.

## 2020-06-17 NOTE — TELEPHONE ENCOUNTER
Pt c/o the lingering cough. She has cough med that helps but has not been using it this week. Per Daughter they will give to patient when she needs for the cough.

## 2020-06-17 NOTE — TELEPHONE ENCOUNTER
Pt called([phone]). Pt coughing up white phlegm. She is out of antibiotics. Please check with Dr Liliane Zuniga to see if she needs more. She just had pneumonia. Her pharmacy is [address] [place] [address].

## 2020-06-24 NOTE — PROGRESS NOTES
1. Have you been to the ER, urgent care clinic since your last visit? Hospitalized since your last visit? 
 
 no 
 
2. Have you seen or consulted any other health care providers outside of the [address] [place] [address] since your last visit? Include any pap smears or colon screening.  
 
  No

## 2020-06-24 NOTE — PROGRESS NOTES
Consent: Chris Ruiz, who was seen by synchronous (real-time) audio-video technology, and/or her healthcare decision maker, is aware that this patient-initiated, Telehealth encounter on 2020 is a billable service, with coverage as determined by her insurance carrier. She is aware that she may receive a bill and has provided verbal consent to proceed: Yes. Subjective:  
Chris Ruiz is a 62 y.o. female who was seen for Cholesterol Problem (2 m f/u ) 
 
2020 Patient seen today for virtual visit. She has shortness of breath on exertion remains on home oxygen. Has edema on the leg. Denies any chest pain Admitted 2020 1. NSTEMI- with positive troponin now down trending- patient is chest pain free. Continue  Lovenox SQ bid. Low risk stress test-medical managment 2. pneumonia- managed by medical team. Lesley Montiel not detected 3. Paroxymal Atrial flutter/atrial fibrillation- maintaining NSR- continue  amiodarone po. Patient was on eliquis at home 4. Acute on Chronic Congestive heart failure-compensated now. Continue lasix po.  
5. Uncontrolled Hypertension- on admission. She was initial placed on Cardene drip. Then became hypotensive.   now with stable bp. Added Low dose Coreg Family History Problem Relation Age of Onset  Seizures Other  Diabetes Mother  Hypertension Mother  Heart Disease Mother  Cancer Mother  Diabetes Father  Stroke Father  Heart Disease Father  Headache Sister  Depression Neg Hx  Suicide Neg Hx  Psychotic Disorder Neg Hx  Substance Abuse Neg Hx  Dementia Neg Hx Past Surgical History:  
[facility]  [facility]    
 HX COLOSTOMY  2020 Colectomy with transverse colostomy  HX CYST REMOVAL Left  Left foot  HX OTHER SURGICAL Left 2017  S/P incision and drainage of the abscess of the left hand and the left foot (4/17/2017 - Dr. House Graft. Andreina Cheek)  HX THORACIC LAMINECTOMY  12/11/2019 S/P T10, T9, T8 laminectomy; T7, T8, T9, T10, T11, T12 posterior thoracic fusion; segmental spinal instrumentation; K2M [place] type, T7-T8, T11-T12 (12/11/2019 - Dr. Alma Rosa Cazares)  HX TUBAL LIGATION Allergies Allergen Reactions  Moxifloxacin Rash  Pcn [Penicillins] Swelling  Sulfa (Sulfonamide Antibiotics) Swelling Current Outpatient Medications:  
  carvediloL (COREG) 3.125 mg tablet, Take 1 Tab by mouth two (2) times daily (with meals). , Disp: 60 Tab, Rfl: 2 
  multivitamin,therapeutic (THERA-TABS PO), Take 1 Tab by mouth daily. , Disp: , Rfl:  
  levothyroxine (Synthroid) 100 mcg tablet, Take 1 Tab by mouth daily. , Disp: , Rfl:  
  sertraline (ZOLOFT) 100 mg tablet, Take 1 Tab by mouth daily. , Disp: , Rfl:  
  allopurinoL (ZYLOPRIM) 100 mg tablet, Take 1 Tab by mouth daily. , Disp: , Rfl:  
  ARIPiprazole (ABILIFY) 5 mg tablet, Take 20 mg by mouth daily. , Disp: , Rfl:  
  amLODIPine (NORVASC) 5 mg tablet, Take 5 mg by mouth daily. , Disp: , Rfl:  
  oxybutynin (DITROPAN) 5 mg tablet, Take 5 mg by mouth two (2) times a day., Disp: , Rfl:  
  methocarbamoL (ROBAXIN) 500 mg tablet, Take 500 mg by mouth two (2) times a day., Disp: , Rfl:  
  umeclidinium-vilanteroL (Anoro Ellipta) 62.5-25 mcg/actuation inhaler, INHALE 1 PUFF DAILY, Disp: 1 Inhaler, Rfl: 5 
  albuterol (ProAir HFA) 90 mcg/actuation inhaler, INHALE 2 PUFFS EVERY 4 HOURS AS NEEDED FOR WHEEZING, Disp: 8.5 Inhaler, Rfl: 4 
  furosemide (LASIX) 20 mg tablet, Take 1 Tab by mouth daily. , Disp: 90 Tab, Rfl: 1 
  apixaban (ELIQUIS) 5 mg tablet, Take 1 Tab by mouth two (2) times a day., Disp: 60 Tab, Rfl: 3 
  amiodarone (CORDARONE) 200 mg tablet, Take 1 Tab by mouth daily. , Disp: 60 Tab, Rfl: 3 
  spironolactone (ALDACTONE) 25 mg tablet, Take 1 Tab by mouth daily. , Disp: 30 Tab, Rfl: 3   predniSONE (DELTASONE) 10 mg tablet, Take 30 mg by mouth daily (with breakfast). , Disp: 90 Tab, Rfl: 1 
  white petrolatum (Protective Ointment) topical ointment, Apply 1 Applicator to affected area two (2) times a week., Disp: , Rfl:  
  insulin glargine (LANTUS) 100 unit/mL injection, 10 Units by SubCUTAneous route daily. , Disp: 1 Vial, Rfl: 2 
  pantoprazole (PROTONIX) 40 mg tablet, Take 1 Tab by mouth Before breakfast and dinner., Disp: 60 Tab, Rfl: 0 
  magnesium oxide (MAG-OX) 400 mg tablet, Take 1 Tab by mouth daily. , Disp: 30 Tab, Rfl: 1   roflumilast (DALIRESP) 250 mcg tab, Take 250 mcg by mouth daily. , Disp: 30 Tab, Rfl: 0 
  busPIRone (BUSPAR) 15 mg tablet, Take 15 mg by mouth two (2) times a day. Indications: repeated episodes of anxiety, Disp: 60 Tab, Rfl: 2 
  OXYGEN-AIR DELIVERY SYSTEMS, 3 L/min by Nasal route continuous. [employer] O2, Disp: , Rfl:  
  albuterol-ipratropium (DUO-NEB) 2.5 mg-0.5 mg/3 ml nebu, 3 mL by Nebulization route every six (6) hours as needed for Wheezing., Disp: 30 Nebule, Rfl: 1   cholecalciferol (VITAMIN D3) (1000 Units /25 mcg) tablet, Take 1,000 Units by mouth daily. , Disp: , Rfl:  
  aspirin 81 mg chewable tablet, Take 1 Tab by mouth daily (with breakfast). Indications: stroke prevention, Disp: 30 Tab, Rfl: 0 
  ascorbic acid, vitamin C, (VITAMIN C) 250 mg tablet, Take 1 Tab by mouth daily (with breakfast). , Disp: 30 Tab, Rfl: 0 
  calcium citrate 200 mg (950 mg) tablet, Take 1 Tab by mouth two (2) times a day. Indications: post-menopausal osteoporosis prevention, Disp: 60 Tab, Rfl: 0 
  ferrous sulfate 325 mg (65 mg iron) tablet, Take 1 Tab by mouth daily (with breakfast). , Disp: 30 Tab, Rfl: 0 
  acetaminophen (TYLENOL) 325 mg tablet, Take 2 Tabs by mouth every four (4) hours as needed for Pain.  Indications: pain, Disp: 60 Tab, Rfl: 0 
  brief disposable (ADULT) misc, by Does Not Apply route., Disp: 1 Package, Rfl: 0 
   polyethylene glycol (MIRALAX) 17 gram/dose powder, Take 17 g by mouth daily. 1 tablespoon with 8 oz of water daily, Disp: 289 g, Rfl: 0 
  BD INSULIN SYRINGE ULTRA-FINE 1 mL 31 gauge x 5/16 syrg, , Disp: , Rfl:  
  rosuvastatin (CRESTOR) 5 mg tablet, TAKE ONE TABLET NIGHTLY (Patient taking differently: Take 5 mg by mouth nightly. TAKE ONE TABLET NIGHTLY), Disp: 90 Tab, Rfl: 3 
  insulin lispro (HUMALOG) 100 unit/mL injection, To be given 15 min before meals: < 150 - None, 151-200 - 2 u, 201-250 - 4 u, 251-300 - 6 u, 301-350 - 8 u, 351-400 - 10 u, >400 - 12 u (Patient taking differently: by SubCUTAneous route three (3) times daily as needed for Other (subcutaneous three times daily as needed before meals per sliding scale. ). To be given 15 min before meals: < 150 - None, 151-200 - 2 u, 201-250 - 4 u, 251-300 - 6 u, 301-350 - 8 u, 351-400 - 10 u, >400 - 12 u), Disp: 1 Vial, Rfl: 0 Allergies Allergen Reactions  Moxifloxacin Rash  Pcn [Penicillins] Swelling  Sulfa (Sulfonamide Antibiotics) Swelling Review of Systems Review of Systems Constitutional: Negative for chills and fever. HENT: Negative for nosebleeds. Eyes: Negative for blurred vision and double vision. Respiratory: See HPI Cardiovascular: See HPI Gastrointestinal: Negative for abdominal pain, heartburn, nausea and vomiting. Musculoskeletal: Negative for myalgias. Skin: Negative for rash. Neurological: Negative for dizziness, weakness and headaches. Endo/Heme/Allergies: Does not bruise/bleed easily. Objective:  
 
Visit Vitals LMP 11/25/2012 (Exact Date) General: alert, cooperative, no distress Mental  status: normal mood, behavior, speech, dress, motor activity, and thought processes, able to follow commands HENT: NCAT Neck: no visualized mass,No JVD Resp: no respiratory distress Neuro: no gross deficits Skin: no discoloration or Bruise on visible areas Psychiatric: normal affect, consistent with stated mood, no evidence of hallucinations Additional exam findings:  
 
Extremities:No edema Pertinent LAB and reports I have reviewed available  pertinent notes, labs and reports and included in current evaluation and management of this patient. Assessment & Plan:  
Diagnoses and all orders for this visit: 1. Diastolic CHF, chronic (HCC) Comments: 
Stable shortness of breath class III multiple etiology monitor 2. Pulmonary HTN ([address] Comments: 
Continue monitoring group 3 3. Pulmonary emphysema, unspecified emphysema type ([address] Comments: On home oxygen continue treatment 4. Essential hypertension Comments: 
Stable monitor 5. PAF (paroxysmal atrial fibrillation) ([facility] Comments: 
Continue amiodarone. Check TSH and LFT Orders: 
-     HEPATIC FUNCTION PANEL; Future 
-     TSH 3RD GENERATION; Future -     T4, FREE; Future Follow-up and Dispositions · Return in about 3 months (around 9/24/2020). Interpretation Summary 5/2020 · Normal cavity size and systolic function (ejection fraction normal). Mild concentric hypertrophy. Estimated left ventricular ejection fraction is 55 - 60%. Visually measured ejection fraction. No regional wall motion abnormality noted. · Dilated left atrium. · Mildly dilated right ventricle. · Dilated right atrium. · Mitral annular calcification. Trace mitral valve regurgitation is present. · Moderate tricuspid valve regurgitation is present. · Pulmonary hypertension. Pulmonary arterial systolic pressure is 53 mmHg. · Moderately elevated central venous pressure (10-15 mmHg); IVC diameter is larger than 21 mm and collapses more than 50% with respiration. 6/2020 Recovering slowly after recent admission. Continue treatment. On amiodarone for PAF. Check LFT and TSH. Continue anticoagulation [address] We discussed the expected course, resolution and complications of the diagnosis(es) in detail. Medication risks, benefits, costs, interactions, and alternatives were discussed as indicated. I advised her to contact the office if her condition worsens, changes or fails to improve as anticipated. She expressed understanding with the diagnosis(es) and plan. Carissa Frias is a 62 y.o. female being evaluated by a video visit encounter for concerns as above. A caregiver was present when appropriate. Due to this being a TeleHealth encounter (During KWOKD-47 public health emergency), evaluation of the following organ systems was limited: Vitals/Constitutional/EENT/Resp/CV/GI//MS/Neuro/Skin/Heme-Lymph-Imm. Pursuant to the emergency declaration under the [facility]5 waiver authority and the [employer] and [employer] Act, this Virtual  Visit was conducted, with patient's (and/or legal guardian's) consent, to reduce the patient's risk of exposure to COVID-19 and provide necessary medical care. Services were provided through a video synchronous discussion virtually to substitute for in-person clinic visit. I was in the office while conducting this encounter.  
 
 
 
Isaiah León MD

## 2020-07-07 NOTE — LETTER
7/7/20 Patient: Isaiah Wang YOB: 1955 Date of Visit: 7/7/2020 Natty Sarabia MD 
[address] [facility] [address] VIA Facsimile: [phone] Dear Natty Sarabia MD, Thank you for referring Ms. Stephen Sinha to [facility] for evaluation. My notes for this consultation are attached. If you have questions, please do not hesitate to call me. I look forward to following your patient along with you.  
 
 
Sincerely, 
 
Aurelio Woods MD

## 2020-07-07 NOTE — PROGRESS NOTES
[facility] [address].  [address], [address]  [facility], [address]  Phone: [phone]  Fax: [phone]  INITIAL CONSULTATION  Patient: Jose Acuna                MRN: [id number]       SSN: [id number]  YOB: 1955        AGE: 62 y.o. SEX: female  Body mass index is 27.98 kg/m². PCP: López Barrera MD  07/07/20    Chief Complaint   Patient presents with    Back Pain     fu         HISTORY OF PRESENT ILLNESS, RADIOGRAPHS, and PLAN:       Edwardo Thompson returns today she is status post her thoracic decompression fusion of this past December for fracture with myelopathy. She had returned from her paraparetic state when I met her. She has had a complex history since her discharge from surgery evidently she had a intestinal obstruction requiring a colostomy and several week ICU stay. She is just recovering from that. She is cold deconditioned though she started to walk and stand with assistance. She has an assist device she her strength in her lower extremities with a grade is 3 out of 5 she is no bowel bladder issues she is has a colostomy. At this point I like to obtain a new study of her spine to the check on her healing. We will obtain a CAT scan of her thoracic spine as she is not steady enough to send for x-rays. I will see her back following her CAT scan or in about 3 months. This dictation was created utilizing voice recognition software. Errors may be present.      Past Medical History:   Diagnosis Date    Abnormally low high density lipoprotein (HDL) cholesterol with hypertriglyceridemia     Lipid profile (11/6/2016) showed , , HDL 38, LDL 37    Acute blood loss as cause of postoperative anemia 12/12/2019    Acute on chronic diastolic (congestive) heart failure ([address] 5/21/2020    Anxiety     Bipolar affective disorder ([address] 12/5/2012    Cellulitis of right forearm 05/04/2017    Chronic back pain     Chronic obstructive pulmonary disease (COPD) ([address]     SOB, on paula O2 Recent admission with psychosis     Chronic respiratory failure with hypoxia (HCC)     On home oxygen inhalation at 3 LPM via NC    Contusion of left elbow 5/4/2017    Depression     Diabetic neuropathy associated with type 2 diabetes mellitus ([facility])     Diastolic dysfunction without heart failure     Stable on diuretics     Falls frequently     Gastroesophageal reflux disease with hiatal hernia     Generalized osteoarthritis of multiple sites     Gout     On Allopurinol    History of acute renal failure 5/31/2013    History of back injury     jumped out of second story window     History of fracture due to fall 12/11/2019    History of hepatitis C     treated    History of penicillin allergy     History of vitamin D deficiency 5/10/2017    Hypertensive heart disease without heart failure     Better controlled     Hyperuricemia 5/26/2017    Hypothyroidism     Hypoxemia requiring supplemental oxygen 12/29/2014    Intravenous drug user 5/2/2017    Long term current use of aspirin     Memory difficulty     Menopause     Mixed connective tissue disease ([address] 5/10/2017    Obesity, Class I, BMI 30-34.9     Olecranon bursitis of right elbow 5/4/2017    Opioid dependence ([facility])     On Methadone    Recurrent genital herpes 5/31/2013    Right Achilles tendinitis 5/2/2017    Sarcoidosis     Sickle cell trait ([address]     Tobacco use disorder     Type 2 diabetes mellitus with diabetic neuropathy, with long-term current use of insulin ([facility])     HbA1c (12/11/2019) = 7.1    Urge urinary incontinence 5/10/2017    Venous insufficiency     Wears glasses        Family History   Problem Relation Age of Onset    Seizures Other     Diabetes Mother     Hypertension Mother     Heart Disease Mother     Cancer Mother     Diabetes Father     Stroke Father     Heart Disease Father     Headache Sister     Depression Neg Hx     Suicide Neg Hx     Psychotic Disorder Neg Hx     Substance Abuse Neg Hx     Dementia Neg Hx        Current Outpatient Medications   Medication Sig Dispense Refill    carvediloL (COREG) 3.125 mg tablet Take 1 Tab by mouth two (2) times daily (with meals). 60 Tab 2    multivitamin,therapeutic (THERA-TABS PO) Take 1 Tab by mouth daily.  levothyroxine (Synthroid) 100 mcg tablet Take 1 Tab by mouth daily.  sertraline (ZOLOFT) 100 mg tablet Take 1 Tab by mouth daily.  allopurinoL (ZYLOPRIM) 100 mg tablet Take 1 Tab by mouth daily.  ARIPiprazole (ABILIFY) 5 mg tablet Take 20 mg by mouth daily.  amLODIPine (NORVASC) 5 mg tablet Take 5 mg by mouth daily.  oxybutynin (DITROPAN) 5 mg tablet Take 5 mg by mouth two (2) times a day.  methocarbamoL (ROBAXIN) 500 mg tablet Take 500 mg by mouth two (2) times a day.  umeclidinium-vilanteroL (Anoro Ellipta) 62.5-25 mcg/actuation inhaler INHALE 1 PUFF DAILY 1 Inhaler 5    albuterol (ProAir HFA) 90 mcg/actuation inhaler INHALE 2 PUFFS EVERY 4 HOURS AS NEEDED FOR WHEEZING 8.5 Inhaler 4    furosemide (LASIX) 20 mg tablet Take 1 Tab by mouth daily. 90 Tab 1    apixaban (ELIQUIS) 5 mg tablet Take 1 Tab by mouth two (2) times a day. 60 Tab 3    amiodarone (CORDARONE) 200 mg tablet Take 1 Tab by mouth daily. 60 Tab 3    spironolactone (ALDACTONE) 25 mg tablet Take 1 Tab by mouth daily. 30 Tab 3    predniSONE (DELTASONE) 10 mg tablet Take 30 mg by mouth daily (with breakfast). 90 Tab 1    white petrolatum (Protective Ointment) topical ointment Apply 1 Applicator to affected area two (2) times a week.  insulin glargine (LANTUS) 100 unit/mL injection 10 Units by SubCUTAneous route daily. 1 Vial 2    pantoprazole (PROTONIX) 40 mg tablet Take 1 Tab by mouth Before breakfast and dinner. 60 Tab 0    magnesium oxide (MAG-OX) 400 mg tablet Take 1 Tab by mouth daily. 30 Tab 1    roflumilast (DALIRESP) 250 mcg tab Take 250 mcg by mouth daily.  30 Tab 0    busPIRone (BUSPAR) 15 mg tablet Take 15 mg by mouth two (2) times a day. Indications: repeated episodes of anxiety 60 Tab 2    OXYGEN-AIR DELIVERY SYSTEMS 3 L/min by Nasal route continuous. [facility] [employer] O2      albuterol-ipratropium (DUO-NEB) 2.5 mg-0.5 mg/3 ml nebu 3 mL by Nebulization route every six (6) hours as needed for Wheezing. 30 Nebule 1    cholecalciferol (VITAMIN D3) (1000 Units /25 mcg) tablet Take 1,000 Units by mouth daily.  aspirin 81 mg chewable tablet Take 1 Tab by mouth daily (with breakfast). Indications: stroke prevention 30 Tab 0    ascorbic acid, vitamin C, (VITAMIN C) 250 mg tablet Take 1 Tab by mouth daily (with breakfast). 30 Tab 0    calcium citrate 200 mg (950 mg) tablet Take 1 Tab by mouth two (2) times a day. Indications: post-menopausal osteoporosis prevention 60 Tab 0    ferrous sulfate 325 mg (65 mg iron) tablet Take 1 Tab by mouth daily (with breakfast). 30 Tab 0    acetaminophen (TYLENOL) 325 mg tablet Take 2 Tabs by mouth every four (4) hours as needed for Pain. Indications: pain 60 Tab 0    brief disposable (ADULT) misc by Does Not Apply route. 1 Package 0    polyethylene glycol (MIRALAX) 17 gram/dose powder Take 17 g by mouth daily. 1 tablespoon with 8 oz of water daily 289 g 0    BD INSULIN SYRINGE ULTRA-FINE 1 mL 31 gauge x 5/16 syrg       rosuvastatin (CRESTOR) 5 mg tablet TAKE ONE TABLET NIGHTLY (Patient taking differently: Take 5 mg by mouth nightly. TAKE ONE TABLET NIGHTLY) 90 Tab 3    insulin lispro (HUMALOG) 100 unit/mL injection To be given 15 min before meals: < 150 - None, 151-200 - 2 u, 201-250 - 4 u, 251-300 - 6 u, 301-350 - 8 u, 351-400 - 10 u, >400 - 12 u (Patient taking differently: by SubCUTAneous route three (3) times daily as needed for Other (subcutaneous three times daily as needed before meals per sliding scale. ).  To be given 15 min before meals: < 150 - None, 151-200 - 2 u, 201-250 - 4 u, 251-300 - 6 u, 301-350 - 8 u, 351-400 - 10 u, >400 - 12 u) 1 Vial 0       Allergies   Allergen Reactions    Moxifloxacin Rash    Pcn [Penicillins] Swelling    Sulfa (Sulfonamide Antibiotics) Swelling       Past Surgical History:   Procedure Laterality Date    HX BACK SURGERY      HX  SECTION      HX COLOSTOMY  2020    Colectomy with transverse colostomy    HX CYST REMOVAL Left     Left foot    HX OTHER SURGICAL Left 2017    S/P incision and drainage of the abscess of the left hand and the left foot (2017 - Dr. Shiraz desouza.  Ora Donahue)    HX THORACIC LAMINECTOMY  2019    S/P T10, T9, T8 laminectomy; T7, T8, T9, T10, T11, T12 posterior thoracic fusion; segmental spinal instrumentation; K2M Elsie type, T7-T8, T11-T12 (2019 - Dr. Clinton Kay)    HX TUBAL LIGATION         Past Medical History:   Diagnosis Date    Abnormally low high density lipoprotein (HDL) cholesterol with hypertriglyceridemia     Lipid profile (2016) showed , , HDL 38, LDL 37    Acute blood loss as cause of postoperative anemia 2019    Acute on chronic diastolic (congestive) heart failure ([address] 2020    Anxiety     Bipolar affective disorder ([facility] 2012    Cellulitis of right forearm 2017    Chronic back pain     Chronic obstructive pulmonary disease (COPD) (HCC)     SOB, on paula O2 Recent admission with psychosis     Chronic respiratory failure with hypoxia (HCC)     On home oxygen inhalation at 3 LPM via NC    Contusion of left elbow 2017    Depression     Diabetic neuropathy associated with type 2 diabetes mellitus (HCC)     Diastolic dysfunction without heart failure     Stable on diuretics     Falls frequently     Gastroesophageal reflux disease with hiatal hernia     Generalized osteoarthritis of multiple sites     Gout     On Allopurinol    History of acute renal failure 2013    History of back injury     jumped out of second story window     History of fracture due to fall 12/11/2019    History of hepatitis C     treated    History of penicillin allergy     History of vitamin D deficiency 5/10/2017    Hypertensive heart disease without heart failure     Better controlled     Hyperuricemia 5/26/2017    Hypothyroidism     Hypoxemia requiring supplemental oxygen 12/29/2014    Intravenous drug user 5/2/2017    Long term current use of aspirin     Memory difficulty     Menopause     [facility] 5/10/2017    Obesity, Class I, BMI 30-34.9     Olecranon bursitis of right elbow 5/4/2017    Opioid dependence ([facility]     On Methadone    Recurrent genital herpes 5/31/2013    Right Achilles tendinitis 5/2/2017    Sarcoidosis     Sickle cell trait ([facility]     Tobacco use disorder     Type 2 diabetes mellitus with diabetic neuropathy, with long-term current use of insulin ([facility])     HbA1c (12/11/2019) = 7.1    Urge urinary incontinence 5/10/2017    Venous insufficiency     Wears glasses        Social History     Socioeconomic History    Marital status: SINGLE     Spouse name: Not on file    Number of children: Not on file    Years of education: Not on file    Highest education level: Not on file   Occupational History    Occupation: Not employed     Employer: NOT EMPLOYED   Social Needs    Financial resource strain: Not on file    Food insecurity     Worry: Not on file     Inability: Not on file    Transportation needs     Medical: Not on file     Non-medical: Not on file   Tobacco Use    Smoking status: Current Every Day Smoker     Packs/day: 1.00     Years: 30.00     Pack years: 30.00     Types: Cigarettes     Start date: 12/2/1969    Smokeless tobacco: Never Used   Substance and Sexual Activity    Alcohol use: No    Drug use: No     Types: Cocaine, Heroin     Comment: +Cocaine Screen 2013    Sexual activity: Not on file     Comment: 2014   Lifestyle    Physical activity     Days per week: Not on file     Minutes per session: Not on file  Stress: Not on file   Relationships    Social connections     Talks on phone: Not on file     Gets together: Not on file     Attends Muslim service: Not on file     Active member of club or organization: Not on file     Attends meetings of clubs or organizations: Not on file     Relationship status: Not on file    Intimate partner violence     Fear of current or ex partner: Not on file     Emotionally abused: Not on file     Physically abused: Not on file     Forced sexual activity: Not on file   Other Topics Concern    Not on file   Social History Narrative    Lives with 15year old son. REVIEW OF SYSTEMS:   CONSTITUTIONAL SYMPTOMS:  Negative. EYES:  Negative. EARS, NOSE, THROAT AND MOUTH:  Negative. CARDIOVASCULAR:  Negative. RESPIRATORY:  Negative. GENITOURINARY: Per HPI. GASTROINTESTINAL:  Per HPI. INTEGUMENTARY (SKIN AND/OR BREAST):  Negative. MUSCULOSKELETAL: Per HPI.   ENDOCRINE/RHEUMATOLOGIC:  Negative. NEUROLOGICAL:  Per HPI. HEMATOLOGIC/LYMPHATIC:  Negative. ALLERGIC/IMMUNOLOGIC:  Negative. PSYCHIATRIC:  Negative. PHYSICAL EXAMINATION:   Visit Vitals  /74 (BP 1 Location: Left arm, BP Patient Position: Sitting)   Pulse 78   Temp 98.6 °F (37 °C) (Oral)   Resp 15   Ht 5' 4\" (1.626 m)   LMP 11/25/2012 (Exact Date)   BMI 27.98 kg/m²    PAIN SCALE: 8/10    CONSTITUTIONAL: The patient is in no apparent distress and is alert and oriented x 3. HEENT: Normocephalic. Hearing grossly intact. NECK: Supple and symmetric. no tenderness, or masses were felt. RESPIRATORY: No labored breathing. CARDIOVASCULAR: The carotid pulses were normal. Peripheral pulses were 2+. CHEST: Normal AP diameter and normal contour without any kyphoscoliosis. LYMPHATIC: No lymphadenopathy was appreciated in the neck, axillae or groin. SKIN:  Negative for scars, rashes, lesions, or ulcers on the right upper, right lower, left upper, left lower and trunk.    NEUROLOGICAL: Alert and oriented x 3. Patient presents in a wheelchair. EXTREMITIES:  See musculoskeletal.  MUSCULOSKELETAL:   Head and Neck: Negative for misalignment, asymmetry, crepitation, defects, tenderness masses or effusions.  Left Upper Extremity: Inspection, percussion and palpation performed. Dunlaps sign is negative.  Right Upper Extremity: Inspection, percussion and palpation performed. Dunlaps sign is negative.  Spine, Ribs and Pelvis: Inspection, percussion and palpation performed. Negative for misalignment, asymmetry, crepitation, defects, tenderness masses or effusions.  Left Lower Extremity: Inspection, percussion and palpation performed. Negative straight leg raise.  Right Lower Extremity: Inspection, percussion and palpation performed. Negative straight leg raise. SPINE EXAM:     Cervical spine: Neck is midline. Normal muscle tone. No focal atrophy is noted. Lumbar spine: No rash, ecchymosis, or gross obliquity. No focal atrophy is noted. ASSESSMENT    ICD-10-CM ICD-9-CM    1. S/P lumbar laminectomy Z98.890 V45.89 CT SPINE LUMB WO CONT       Written by Axel Renae, as dictated by Sherif Rm MD.    I, Dr. Sherif Rm MD, confirm that all documentation is accurate.

## 2020-07-28 NOTE — TELEPHONE ENCOUNTER
Patient called and requesting Colectomy to be reversed. Patient states has been constipated since Saturday 07/25/20. Patient denies fever, chills, nausea, and vomiting. Patient states incision where Colectomy was placed is draining. Patient states she has 4 bleeding bed sores on her buttocks. Patient states has been drinking plenty of fluids including ginger ale and orange juice. Patient has made an appointment for Thursday 07/30/20. Patient states will have to schedule an appointment with transportation for  and drop off. PROCEDURES PERFORMED:on 02/29/20  1.  Exploratory laparotomy. 2.  Drainage of multiple collection of stool and abscess collections. 3.  Sigmoid colectomy. 4.  Open abdomen.

## 2020-07-30 NOTE — TELEPHONE ENCOUNTER
Patient states she has one prednisone left despite just receiving a refill she said someone took them as she has a lot of people in her house.   Pt phone number [phone]  Pharmacy: [phone]

## 2020-07-30 NOTE — PROGRESS NOTES
General Surgery Consult    Stephen Sinha  Admit date: (Not on file)    MRN: [id number]     : 1955     Age: 62 y.o. Attending Physician: Jo Merrill MD, [facility]      History of Present Illness:      Stuart Desai is a 62 y.o. female who is very well-known to me. In March of this year I have performed a Anish procedure for perforated sigmoid diverticulitis. She has been doing relatively well however she stated that she has severe cataract that he is supposed to have eye surgery soon. When I saw her the last time the plan was for her to follow-up with the GI team for a colonoscopy through the ostomy itself and the rectum as well for further evaluation. She is scheduled for this on September 3. She stated that for the past week or so she has been having some diffuse abdominal pain but mainly on the left side toward the ostomy. She said the pain is mild and she is tolerating regular diet and her ostomy is functioning. The ostomy bag was empty today in the morning before she came here. She denies any nausea or vomiting or any fever or chills.      Patient Active Problem List    Diagnosis Date Noted    Colostomy care [facility]) 2020    Cardiomyopathy ([address] 2020    Wheelchair bound 2020    Paraplegia ([address] 2020    Personality disorder ([address] 2020    Centrilobular emphysema ([address] 2020    NSTEMI (non-ST elevated myocardial infarction) ([address] 2020    Suspected COVID-19 virus infection 2020    Acute on chronic diastolic (congestive) heart failure ([address] 2020    Hypokalemia 2020    Hypomagnesemia 2020    Respiratory distress 2020    Acute on chronic respiratory failure with hypoxemia ([address] 2020    History of drug abuse ([address] 2020    HCAP (healthcare-associated pneumonia) 2020    Polysubstance abuse ([address] 2020    Pulmonary hypertension, moderate to severe ([address] 2020    Elevated bilirubin 05/16/2020    PAF (paroxysmal atrial fibrillation) ([address] 04/08/2020    Perforated viscus 02/29/2020    Septic shock ([address] 02/29/2020    Sepsis ([address] 01/17/2020    Chronic respiratory failure with hypoxia (HCC)     Gout     Menopause     Long term current use of aspirin     Opioid dependence ([address]     Tobacco use disorder     Acute blood loss as cause of postoperative anemia 12/12/2019    Impaired mobility and ADLs 12/11/2019    Spinal cord compression ([address] 12/11/2019    Compression fracture of body of thoracic vertebra (HCC) 12/11/2019    Status post laminectomy with spinal fusion 12/11/2019    History of fracture due to fall 12/11/2019    Hyperuricemia 05/26/2017    Mixed connective tissue disease ([address] 05/10/2017    History of vitamin D deficiency 05/10/2017    Urge urinary incontinence 05/10/2017    History of penicillin allergy     Falls frequently     Gastroesophageal reflux disease with hiatal hernia     Memory difficulty     Contusion of left elbow 05/04/2017    Intravenous drug user 05/02/2017    Right Achilles tendinitis 05/02/2017    Cellulitis and abscess of foot 04/13/2017    Abnormal nuclear stress test 11/17/2016    Acute exacerbation of chronic obstructive pulmonary disease ([address] 03/30/2016    Sarcoidosis of lung ([address] 03/30/2016    Cocaine abuse ([facility] 07/12/2015    Chronic diastolic heart failure ([address] 07/09/2015    Dependence on supplemental oxygen 07/09/2015    Hypoxemia requiring supplemental oxygen 12/29/2014    History of acute renal failure 05/31/2013    Recurrent genital herpes 05/31/2013    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/2013    Hypothyroidism     Sarcoidosis     Chronic obstructive pulmonary disease (COPD) ([address]     History of back injury     Type 2 diabetes mellitus with diabetic neuropathy, with long-term current use of insulin ([address]     Anxiety     Wears glasses     History of hepatitis C     Sickle cell trait ([address]     Abnormally low high density lipoprotein (HDL) cholesterol with hypertriglyceridemia     Bipolar 1 disorder (HCC) 12/05/2012    Generalized osteoarthritis of multiple sites     Diabetic neuropathy associated with type 2 diabetes mellitus ([facility]     Venous insufficiency     Obesity, Class I, BMI 30-34.9     Chronic back pain      Past Medical History:   Diagnosis Date    Abnormally low high density lipoprotein (HDL) cholesterol with hypertriglyceridemia     Lipid profile (11/6/2016) showed , , HDL 38, LDL 37    Acute blood loss as cause of postoperative anemia 12/12/2019    Acute on chronic diastolic (congestive) heart failure ([facility] 5/21/2020    Anxiety     Bipolar affective disorder ([facility] 12/5/2012    Cellulitis of right forearm 05/04/2017    Chronic back pain     Chronic obstructive pulmonary disease (COPD) (HCC)     SOB, on [place] O2 Recent admission with psychosis     Chronic respiratory failure with hypoxia ([facility])     On home oxygen inhalation at 3 LPM via NC    Contusion of left elbow 5/4/2017    Depression     Diabetic neuropathy associated with type 2 diabetes mellitus (HCC)     Diastolic dysfunction without heart failure     Stable on diuretics     Falls frequently     Gastroesophageal reflux disease with hiatal hernia     Generalized osteoarthritis of multiple sites     Gout     On Allopurinol    History of acute renal failure 5/31/2013    History of back injury     jumped out of second story window     History of fracture due to fall 12/11/2019    History of hepatitis C     treated    History of penicillin allergy     History of vitamin D deficiency 5/10/2017    Hypertensive heart disease without heart failure     Better controlled     Hyperuricemia 5/26/2017    Hypothyroidism     Hypoxemia requiring supplemental oxygen 12/29/2014    Intravenous drug user 5/2/2017    Long term current use of aspirin     Memory difficulty     Menopause     Mixed connective tissue disease ([facility] 5/10/2017    Obesity, Class I, BMI 30-34.9     Olecranon bursitis of right elbow 2017    Opioid dependence ([facility]     On Methadone    Recurrent genital herpes 2013    Right Achilles tendinitis 2017    Sarcoidosis     Sickle cell trait ([facility]     Tobacco use disorder     Type 2 diabetes mellitus with diabetic neuropathy, with long-term current use of insulin ([facility])     HbA1c (2019) = 7.1    Urge urinary incontinence 5/10/2017    Venous insufficiency     Wears glasses       Past Surgical History:   Procedure Laterality Date    HX BACK SURGERY      HX  SECTION      HX COLOSTOMY  2020    Colectomy with transverse colostomy    HX CYST REMOVAL Left     Left foot    HX OTHER SURGICAL Left 2017    S/P incision and drainage of the abscess of the left hand and the left foot (2017 - Dr. Julio White.  Denise Cain)    HX THORACIC LAMINECTOMY  2019    S/P T10, T9, T8 laminectomy; T7, T8, T9, T10, T11, T12 posterior thoracic fusion; segmental spinal instrumentation; K2M [place] type, T7-T8, T11-T12 (2019 - Dr. Eulalia Allen)    HX TUBAL LIGATION        Social History     Tobacco Use    Smoking status: Current Every Day Smoker     Packs/day: 1.00     Years: 30.00     Pack years: 30.00     Types: Cigarettes     Start date: 1969    Smokeless tobacco: Never Used   Substance Use Topics    Alcohol use: No      Social History     Tobacco Use   Smoking Status Current Every Day Smoker    Packs/day: 1.00    Years: 30.00    Pack years: 30.00    Types: Cigarettes    Start date: 1969   Smokeless Tobacco Never Used     Family History   Problem Relation Age of Onset    Seizures Other     Diabetes Mother     Hypertension Mother     Heart Disease Mother     Cancer Mother     Diabetes Father     Stroke Father     Heart Disease Father     Headache Sister     Depression Neg Hx     Suicide Neg Hx     Psychotic Disorder Neg Hx     Substance Abuse Neg Hx     Dementia Neg Hx       Current Outpatient Medications   Medication Sig    predniSONE (DELTASONE) 10 mg tablet Take 30 mg by mouth daily (with breakfast).  carvediloL (COREG) 3.125 mg tablet Take 1 Tab by mouth two (2) times daily (with meals).  albuterol (ProAir HFA) 90 mcg/actuation inhaler INHALE 2 PUFFS EVERY 4 HOURS AS NEEDED FOR WHEEZING    furosemide (LASIX) 20 mg tablet Take 1 Tab by mouth daily.  apixaban (ELIQUIS) 5 mg tablet Take 1 Tab by mouth two (2) times a day.  amiodarone (CORDARONE) 200 mg tablet Take 1 Tab by mouth daily.  spironolactone (ALDACTONE) 25 mg tablet Take 1 Tab by mouth daily.  insulin glargine (LANTUS) 100 unit/mL injection 10 Units by SubCUTAneous route daily.  pantoprazole (PROTONIX) 40 mg tablet Take 1 Tab by mouth Before breakfast and dinner.  magnesium oxide (MAG-OX) 400 mg tablet Take 1 Tab by mouth daily.  roflumilast (DALIRESP) 250 mcg tab Take 250 mcg by mouth daily.  aspirin 81 mg chewable tablet Take 1 Tab by mouth daily (with breakfast). Indications: stroke prevention    ascorbic acid, vitamin C, (VITAMIN C) 250 mg tablet Take 1 Tab by mouth daily (with breakfast).  calcium citrate 200 mg (950 mg) tablet Take 1 Tab by mouth two (2) times a day. Indications: post-menopausal osteoporosis prevention    acetaminophen (TYLENOL) 325 mg tablet Take 2 Tabs by mouth every four (4) hours as needed for Pain. Indications: pain    brief disposable (ADULT) misc by Does Not Apply route.  polyethylene glycol (MIRALAX) 17 gram/dose powder Take 17 g by mouth daily. 1 tablespoon with 8 oz of water daily    rosuvastatin (CRESTOR) 5 mg tablet TAKE ONE TABLET NIGHTLY (Patient taking differently: Take 5 mg by mouth nightly.  TAKE ONE TABLET NIGHTLY)    insulin lispro (HUMALOG) 100 unit/mL injection To be given 15 min before meals: < 150 - None, 151-200 - 2 u, 201-250 - 4 u, 251-300 - 6 u, 301-350 - 8 u, 351-400 - 10 u, >400 - 12 u (Patient taking differently: by SubCUTAneous route three (3) times daily as needed for Other (subcutaneous three times daily as needed before meals per sliding scale. ). To be given 15 min before meals: < 150 - None, 151-200 - 2 u, 201-250 - 4 u, 251-300 - 6 u, 301-350 - 8 u, 351-400 - 10 u, >400 - 12 u)    albuterol (PROVENTIL VENTOLIN) 2.5 mg /3 mL (0.083 %) nebu 3 mL by Nebulization route every four (4) hours as needed for Wheezing.  multivitamin,therapeutic (THERA-TABS PO) Take 1 Tab by mouth daily.  levothyroxine (Synthroid) 100 mcg tablet Take 1 Tab by mouth daily.  sertraline (ZOLOFT) 100 mg tablet Take 1 Tab by mouth daily.  allopurinoL (ZYLOPRIM) 100 mg tablet Take 1 Tab by mouth daily.  ARIPiprazole (ABILIFY) 5 mg tablet Take 20 mg by mouth daily.  amLODIPine (NORVASC) 5 mg tablet Take 5 mg by mouth daily.  oxybutynin (DITROPAN) 5 mg tablet Take 5 mg by mouth two (2) times a day.  methocarbamoL (ROBAXIN) 500 mg tablet Take 500 mg by mouth two (2) times a day.  umeclidinium-vilanteroL (Anoro Ellipta) 62.5-25 mcg/actuation inhaler INHALE 1 PUFF DAILY    white petrolatum (Protective Ointment) topical ointment Apply 1 Applicator to affected area two (2) times a week.  busPIRone (BUSPAR) 15 mg tablet Take 15 mg by mouth two (2) times a day. Indications: repeated episodes of anxiety    OXYGEN-AIR DELIVERY SYSTEMS 3 L/min by Nasal route continuous. [employer] O2    cholecalciferol (VITAMIN D3) (1000 Units /25 mcg) tablet Take 1,000 Units by mouth daily.  ferrous sulfate 325 mg (65 mg iron) tablet Take 1 Tab by mouth daily (with breakfast).  BD INSULIN SYRINGE ULTRA-FINE 1 mL 31 gauge x 5/16 syrg      No current facility-administered medications for this visit.        Allergies   Allergen Reactions    Moxifloxacin Rash    Pcn [Penicillins] Swelling    Sulfa (Sulfonamide Antibiotics) Swelling        Review of Systems:  Constitutional: negative  Eyes: positive for cataracts  Ears, Nose, Mouth, Throat, and Face: negative  Respiratory: negative  Cardiovascular: negative  Gastrointestinal: positive for abdominal pain  Genitourinary:negative  Integument/Breast: negative  Hematologic/Lymphatic: negative  Musculoskeletal:negative  Neurological: negative  Behavioral/Psychiatric: negative  Endocrine: negative  Allergic/Immunologic: negative    Objective:     Visit Vitals  /73   Pulse 73   Temp 97 °F (36.1 °C)   Ht 5' 4\" (1.626 m)   Wt 73.9 kg (163 lb)   LMP 11/25/2012 (Exact Date)   SpO2 91%   BMI 27.98 kg/m²       Physical Exam:      General:  in no apparent distress, alert, oriented times 3 and cooperative   Eyes:  conjunctivae and sclerae normal, pupils equal, round, reactive to light   Throat & Neck: no erythema or exudates noted and neck supple and symmetrical; no palpable masses   Lungs:   clear to auscultation bilaterally   Heart:  Regular rate and rhythm   Abdomen:   rounded, soft, nontender, nondistended, no masses or organomegaly. Her midline wound is healing very well. Her colostomy is clearly viable and no evidence of any hernia.     Extremities: extremities normal, atraumatic, no cyanosis or edema   Skin: Normal.       Imaging and Lab Review:     CBC:   Lab Results   Component Value Date/Time    WBC 11.4 05/26/2020 05:08 AM    RBC 3.57 (L) 05/26/2020 05:08 AM    HGB 8.8 (L) 05/26/2020 05:08 AM    HCT 29.3 (L) 05/26/2020 05:08 AM    PLATELET 705 25/15/1253 05:08 AM     BMP:   Lab Results   Component Value Date/Time    Glucose 100 (H) 05/26/2020 05:08 AM    Sodium 139 05/26/2020 05:08 AM    Potassium 3.5 05/26/2020 05:08 AM    Chloride 99 (L) 05/26/2020 05:08 AM    CO2 37 (H) 05/26/2020 05:08 AM    BUN 10 05/26/2020 05:08 AM    Creatinine 0.55 (L) 05/26/2020 05:08 AM    Calcium 8.0 (L) 05/26/2020 05:08 AM     CMP:  Lab Results   Component Value Date/Time    Glucose 100 (H) 05/26/2020 05:08 AM    Sodium 139 05/26/2020 05:08 AM    Potassium 3.5 05/26/2020 05:08 AM    Chloride 99 (L) 05/26/2020 05:08 AM    CO2 37 (H) 05/26/2020 05:08 AM    BUN 10 05/26/2020 05:08 AM    Creatinine 0.55 (L) 05/26/2020 05:08 AM    Calcium 8.0 (L) 05/26/2020 05:08 AM    Anion gap 3 05/26/2020 05:08 AM    BUN/Creatinine ratio 18 05/26/2020 05:08 AM    Alk. phosphatase 56 05/22/2020 03:16 AM    Protein, total 6.1 (L) 05/22/2020 03:16 AM    Albumin 2.5 (L) 05/22/2020 03:16 AM    Globulin 3.6 05/22/2020 03:16 AM    A-G Ratio 0.7 (L) 05/22/2020 03:16 AM       No results found for this or any previous visit (from the past 24 hour(s)). images and reports reviewed    Assessment:   Byron Mason is a 62 y.o. female who is status post a Anish procedure for perforated diverticulitis about 4 or 5 months ago. She is doing relatively well and I am not concerned about her abdominal pain which is better now. Her ostomy is clearly functioning and viable. The patient also asked about the colostomy reversal which I explained to her that she needs to be seen by the GI team first for further evaluation and colonoscopy and enema before discussing that. She is scheduled for September 3 for a GI consultation.      Plan:     Follow-up with her ophthalmologist for her cataracts  Follow-up with the GI team on September 3  Follow-up with me after that for further management    Please call me if you have any questions (cell phone: [phone])     Signed By: Ebony Palacios MD     July 30, 2020
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
## 2020-08-05 NOTE — PROGRESS NOTES
0830 Received sitting up on side of bed eating breakfast. A/OX4. Obese. Post Op Day 2. Dressing left arm and left foot dry and intact. Encouraged to keep both area elevated up on pillow. 02 2 liters with humidity. Lung sound clear diminished bilaterally. Fall precautions. Call bell phone within reach. C/o pain to left hand, left foot and right hip. I have personally evaluated and examined the patient. The Attending was available to me as a supervising provider if needed.

## 2020-08-20 NOTE — TELEPHONE ENCOUNTER
Patient was calling re refill on Prednisone 10mg to take 30mg daily. Rx still has 1 refill at her pharmacy. Daughter and patient advised.

## 2020-08-20 NOTE — TELEPHONE ENCOUNTER
Pt [facility]([phone]). Pt wants Dr Wale Redman to increase her Prednisone to 60mg and send to [facility] center [phone]. Please call if any questions.

## 2020-08-25 NOTE — TELEPHONE ENCOUNTER
Pt called([phone]). Needs script for Prednisone for 60mg not 30mg. She has a cold. Please check with Dr Anna Her and call her back.

## 2021-09-02 NOTE — DISCHARGE INSTRUCTIONS
DISCHARGE SUMMARY from Nurse    The following personal items are in your possession at time of discharge:    Dental Appliances: Uppers  Visual Aid: None     Home Medications: None  Jewelry: None  Clothing: At bedside  Other Valuables: None             PATIENT INSTRUCTIONS:    After general anesthesia or intravenous sedation, for 24 hours or while taking prescription Narcotics:  · Limit your activities  · Do not drive and operate hazardous machinery  · Do not make important personal or business decisions  · Do  not drink alcoholic beverages  · If you have not urinated within 8 hours after discharge, please contact your surgeon on call. Report the following to your surgeon:  · Excessive pain, swelling, redness or odor of or around the surgical area  · Temperature over 100.5  · Nausea and vomiting lasting longer than 4 hours or if unable to take medications  · Any signs of decreased circulation or nerve impairment to extremity: change in color, persistent  numbness, tingling, coldness or increase pain  · Any questions            *  Please give a list of your current medications to your Primary Care Provider. *  Please update this list whenever your medications are discontinued, doses are      changed, or new medications (including over-the-counter products) are added. *  Please carry medication information at all times in case of emergency situations. These are general instructions for a healthy lifestyle:    No smoking/ No tobacco products/ Avoid exposure to second hand smoke    Surgeon General's Warning:  Quitting smoking now greatly reduces serious risk to your health.     Obesity, smoking, and sedentary lifestyle greatly increases your risk for illness    A healthy diet, regular physical exercise & weight monitoring are important for maintaining a healthy lifestyle    You may be retaining fluid if you have a history of heart failure or if you experience any of the following symptoms:  Weight gain of 3 pounds or more overnight or 5 pounds in a week, increased swelling in our hands or feet or shortness of breath while lying flat in bed. Please call your doctor as soon as you notice any of these symptoms; do not wait until your next office visit. Recognize signs and symptoms of STROKE:    F-face looks uneven    A-arms unable to move or move unevenly    S-speech slurred or non-existent    T-time-call 911 as soon as signs and symptoms begin-DO NOT go       Back to bed or wait to see if you get better-TIME IS BRAIN. Warning Signs of HEART ATTACK     Call 911 if you have these symptoms:   Chest discomfort. Most heart attacks involve discomfort in the center of the chest that lasts more than a few minutes, or that goes away and comes back. It can feel like uncomfortable pressure, squeezing, fullness, or pain.  Discomfort in other areas of the upper body. Symptoms can include pain or discomfort in one or both arms, the back, neck, jaw, or stomach.  Shortness of breath with or without chest discomfort.  Other signs may include breaking out in a cold sweat, nausea, or lightheadedness. Don't wait more than five minutes to call 911 - MINUTES MATTER! Fast action can save your life. Calling 911 is almost always the fastest way to get lifesaving treatment. Emergency Medical Services staff can begin treatment when they arrive -- up to an hour sooner than if someone gets to the hospital by car. The discharge information has been reviewed with the patient. The patient verbalized understanding. Discharge medications reviewed with the patient and appropriate educational materials and side effects teaching were provided.     Patient armband removed and shredded no

## 2022-01-31 NOTE — PROGRESS NOTES
[facility]    [address] [place] [id number]      SIMPLE PULMONARY STRESS TEST - 6 MINUTE WALK    PATIENT NAME: Tala RODRIGUEZ Roots    DATE: 11/5/2018     YOB: 1955     AGE: 59 y.o. DIAGNOSIS: No diagnosis found. TECHNICIAN: Leland Whipple, RT         PHYSICIAN: Dr Aranza Macdonald MD      Visit Vitals  /68 (BP 1 Location: Left arm, BP Patient Position: At rest)   Pulse 93   Temp 98.5 °F (36.9 °C) (Oral)   Resp 20   Ht 5' 5\" (1.651 m)   Wt 175 lb (79.4 kg)   LMP 11/25/2012 (Exact Date)   SpO2 93% Comment: at rest on room air. BMI 29.12 kg/m²        RESTING DATA:  Dyspnea Scale (1-10):    4 SOB    EXERCISE DATA:   6 MINUTE WALK - HALLWAY (34 METERS)      1   RA    93 % SAT   105 HR   5 SOB     1 Laps x 34m = 34m  2   RA    82 % SAT   114 HR   5 SOB    . 5 Laps x 34m = 17m  3   2L    92 % SAT   103 HR   5 SOB    . 5 Laps x 34m = 17m  4   2L    90 % SAT   112 HR   5 SOB     1 Laps x 34m = 34m  5   2L    88 % SAT   116 HR   5 SOB     1 Laps x 34m = 34m  6   3L    93 % SAT   112 HR   5 SOB    . 5 Laps x 34m = 17m    TOTAL DISTANCE:   153 M    RECOVERY DATA:      1   3L    94 % SAT   107 HR   22 RR   120/70 BP   5 SOB  2   2L    95 % SAT     85 HR   20 RR   120/66 BP   4 SOB  3   RA    91%  SAT    85 HR   20 RR   120/68 BP   4 SOB      TECHNICIAN COMMENTS: Patient began at rest on room air showing an 02 saturation level of 93%. 6 minute walk on room air after 2 minutes 02 saturation level decreased to 82%, Patient placed on 2/lpm nasal cannula of oxygen and 02 saturation level increased to 92% after 3 minutes of walking on 2/lpm 02 saturation level decreased to 88% and 02 liter flow was increased to 3/lpm and 02 saturation level increased to 93%.  Patient's heart rate increased during walk to a high of 116 bpm. During resting, 02 saturation level increased to 94% and heart rate decreased to 107 bpm. Patient's 02 liter flow decreased to 2/lpm and 02 saturation level increased to 96% and heart rate decreased to 85 bpm. On room air 02 saturation level decreased to 91% and no change with heart rate. Metronidazole Pregnancy And Lactation Text: This medication is Pregnancy Category B and considered safe during pregnancy.  It is also excreted in breast milk.

## 2022-05-10 NOTE — PROGRESS NOTES
- resume steroid inhaler - 1 puff twice daily  - OK to use the albuterol inhaler as a rescue inhaler (2 puffs every 4 hours as needed)    Symptom management for upper respiratory infections / viral illness    · Drink plenty of fluids  · Get plenty of rest   · Use a cool mist humidifier at night               Cough  · Honey (1 teaspoon) as needed (if not diabetic)  · Steam showers to help loosen the congestion  · To reduce coughing at night, elevate head on 2-3 pillows    Sore throat  · Take Ibuprofen (Advil) or Acetaminophen (Tylenol) for pain as needed  · Lukewarm salt water gargles  · Hot tea w/ honey and/or lemon               Nasal/sinus congestion  · Nasal saline rinse kit or saline spray 3 times daily as needed ([place] saline drops or saline nose spray).     For difficulty sleeping, OK to take diphenhydramine (Benadryl) as needed.  Do not take if you are taking other medications that cause sedation.    Viral upper respiratory infections can last up to 14 days, the average is 7-11 days.    Follow up with PCP if symptoms are not improving in the next few days.  Go to ER for chest pain, shortness of breath, weakness, dehydration, abdominal pain etc.       This patient has 1 more week of therapy then tentative d/c 4/28/2020. We are considering extending PT, but the patient wants to wait a little until her incision is healed better, to allow it to heal.  She thinks the transfers is pulling at it too much. Ashtyn Hernandez,  We are thinking of adding 1x/week until the incision is more healed. SN has orders through next week, but are you extending? How is the wound healing? You can email or c all, thanks. I am sending this vial email also to get it to you faster.

## 2024-11-21 NOTE — PROGRESS NOTES
[facility]    No new c/o    Left and and left foot: dressings were changed by Wound team yesterday .     Dressing in place left hand/left foot    Moves left hand and and left foot well    Impression    Abscesses (IVDA): wound care      Cari IV discontinued, cath removed intact

## (undated) DEVICE — STOCKING COMPR L L29-31IN REG 19MMHG ANK 9-10IN CALF

## (undated) DEVICE — RELOAD STPL L75MM OPN H3.8MM CLS 1.5MM WIRE DIA0.2MM REG

## (undated) DEVICE — SUTURE VCRL SZ 2-0 L36IN ABSRB UD L36MM CT-1 1/2 CIR J945H

## (undated) DEVICE — SHEET, T, LAPAROTOMY, STERILE: Brand: MEDLINE

## (undated) DEVICE — Device

## (undated) DEVICE — PACK PROCEDURE SURG MAJ W/ BASIN LF

## (undated) DEVICE — SUTURE VCRL SZ 3-0 L18IN ABSRB UD L26MM SH 1/2 CIR J864D

## (undated) DEVICE — SUTURE VCRL SZ 2-0 L18IN ABSRB VLT CT-1 L36MM 1/2 CIR J739D

## (undated) DEVICE — REM POLYHESIVE ADULT PATIENT RETURN ELECTRODE: Brand: VALLEYLAB

## (undated) DEVICE — 3M™ IOBAN™ 2 ANTIMICROBIAL INCISE DRAPE 6650EZ: Brand: IOBAN™ 2

## (undated) DEVICE — DRAPE,HAND,STERILE: Brand: MEDLINE

## (undated) DEVICE — BANDAGE,GAUZE,BULKEE II,4.5"X4.1YD,STRL: Brand: MEDLINE

## (undated) DEVICE — STERILE LATEX POWDER-FREE SURGICAL GLOVESWITH NITRILE COATING: Brand: PROTEXIS

## (undated) DEVICE — SUTURE PERMA-HAND SZ 2-0 L24IN NONABSORBABLE BLK W/O NDL SA75H

## (undated) DEVICE — TRAY,URINE METER,100% SILICONE,16FR10ML: Brand: MEDLINE

## (undated) DEVICE — GLOVE SURG BIOGEL 8.0 STRL -- SKINSENSE

## (undated) DEVICE — KIT CLN UP BON SECOURS MARYV

## (undated) DEVICE — PREP SKN CHLRAPRP APPL 10.5ML --

## (undated) DEVICE — DRAIN SURG W7MMXL20CM SIL FULL PERF HUBLESS FLAT RADPQ STRP

## (undated) DEVICE — FLEX ADVANTAGE 3000CC: Brand: FLEX ADVANTAGE

## (undated) DEVICE — BLANKET WRM AD W50XL85.8IN PACU FULL BODY FORC AIR

## (undated) DEVICE — SUTURE VCRL SZ 1 L18IN ABSRB VLT CTX L48MM 1/2 CIR J765D

## (undated) DEVICE — SOLUTION IV 1000ML 0.9% SOD CHL

## (undated) DEVICE — SUTURE PERMAHAND SZ 3-0 L18IN NONABSORBABLE BLK L26MM SH C013D

## (undated) DEVICE — ADHESIVE TISS DERMA FLEX 0.7ML -- HIGH VISCOSITY

## (undated) DEVICE — AIRLIFE™ ADULT CUSHION NASAL CANNULA 14 FOOT (4.3) CRUSH-RESISTANT OXYGEN TUBING, AND U/CONNECT-IT ADAPTER: Brand: AIRLIFE™

## (undated) DEVICE — 3M™ BAIR PAWS FLEX™ WARMING GOWN, STANDARD, 20 PER CASE 81003: Brand: BAIR PAWS™

## (undated) DEVICE — 1010 S-DRAPE TOWEL DRAPE 10/BX: Brand: STERI-DRAPE™

## (undated) DEVICE — DRAIN SURG W10MMXL20CM SIL FLAT HUBLESS W/ RADPQ STRP 3/4

## (undated) DEVICE — (D)PREP SKN CHLRAPRP APPL 26ML -- CONVERT TO ITEM 371833

## (undated) DEVICE — CURVED, LARGE JAW, OPEN SEALER/DIVIDER NANO-COATED: Brand: LIGASURE IMPACT

## (undated) DEVICE — SUT VCRL + 1 18IN CTX MP VIO --

## (undated) DEVICE — JACKSON TABLE POSITIONER KIT: Brand: MEDLINE INDUSTRIES, INC.

## (undated) DEVICE — Device: Brand: LEVEL 1

## (undated) DEVICE — BLADE ASSEMB CLP HAIR FINE --

## (undated) DEVICE — SEALER ENDOSCP NANO COAT OPN DIV CRV L JAW LIGASURE IMPACT

## (undated) DEVICE — INTENDED FOR TISSUE SEPARATION, AND OTHER PROCEDURES THAT REQUIRE A SHARP SURGICAL BLADE TO PUNCTURE OR CUT.: Brand: BARD-PARKER SAFETY BLADES SIZE 10, STERILE

## (undated) DEVICE — THREE-QUARTER SHEET: Brand: CONVERTORS

## (undated) DEVICE — Device: Brand: MEDICAL ACTION INDUSTRIES

## (undated) DEVICE — SWAB CULT LIQ STUART AGR AERB MOD IN BRK SGL RAYON TIP PLAS 220099] BECTON DICKINSON MICRO]

## (undated) DEVICE — BONE BIOPSY DEVICE F07A TAPERED SIZE 2: Brand: MEDTRONIC REUSABLE INSTRUMENTS

## (undated) DEVICE — STERILE POLYISOPRENE POWDER-FREE SURGICAL GLOVES: Brand: PROTEXIS

## (undated) DEVICE — DRAPE TOWEL: Brand: CONVERTORS

## (undated) DEVICE — STAPLER INT L75MM CUT LN L73MM STPL LN L77MM BLU B FRM 8

## (undated) DEVICE — GAUZE SPONGES,16 PLY: Brand: CURITY

## (undated) DEVICE — SUTURE VCRL SZ 4-0 L27IN ABSRB UD L19MM PS-2 3/8 CIR PRIM J426H

## (undated) DEVICE — SUTURE VCRL SZ 3-0 L27IN ABSRB UD L26MM SH 1/2 CIR J416H

## (undated) DEVICE — PREP SKN CHLORHEX GLUC 8OZ --

## (undated) DEVICE — HEX-LOCKING BLADE ELECTRODE: Brand: EDGE

## (undated) DEVICE — KERLIX BANDAGE ROLL: Brand: KERLIX

## (undated) DEVICE — SUTURE PERMA-HAND SZ 3-0 L24IN NONABSORBABLE BLK W/O NDL SA74H

## (undated) DEVICE — GARMENT,MEDLINE,DVT,SEQUENTIAL,CALF,MD: Brand: MEDLINE

## (undated) DEVICE — LIGHT HANDLE: Brand: DEVON

## (undated) DEVICE — (D)GLOVE SURG TRIFLX 8 PWD LTX -- DISC BY MFR USE ITEM 302994

## (undated) DEVICE — INTENDED FOR TISSUE SEPARATION, AND OTHER PROCEDURES THAT REQUIRE A SHARP SURGICAL BLADE TO PUNCTURE OR CUT.: Brand: BARD-PARKER ®  SAFETY SCALPED